# Patient Record
Sex: MALE | Race: WHITE | NOT HISPANIC OR LATINO | Employment: UNEMPLOYED | URBAN - METROPOLITAN AREA
[De-identification: names, ages, dates, MRNs, and addresses within clinical notes are randomized per-mention and may not be internally consistent; named-entity substitution may affect disease eponyms.]

---

## 2017-03-17 ENCOUNTER — HOSPITAL ENCOUNTER (EMERGENCY)
Facility: HOSPITAL | Age: 32
Discharge: HOME | End: 2017-03-17
Attending: EMERGENCY MEDICINE | Admitting: EMERGENCY MEDICINE
Payer: SUBSIDIZED

## 2017-03-17 VITALS
TEMPERATURE: 98.8 F | WEIGHT: 260 LBS | SYSTOLIC BLOOD PRESSURE: 137 MMHG | DIASTOLIC BLOOD PRESSURE: 67 MMHG | OXYGEN SATURATION: 96 % | RESPIRATION RATE: 16 BRPM | HEIGHT: 72 IN | HEART RATE: 94 BPM | BODY MASS INDEX: 35.21 KG/M2

## 2017-03-17 DIAGNOSIS — K02.9 DENTAL CARIES: ICD-10-CM

## 2017-03-17 DIAGNOSIS — K04.7 PERIAPICAL ABSCESS WITH FACIAL INVOLVEMENT: Primary | ICD-10-CM

## 2017-03-17 DIAGNOSIS — K08.89 ODONTALGIA: ICD-10-CM

## 2017-03-17 PROCEDURE — 96365 THER/PROPH/DIAG IV INF INIT: CPT

## 2017-03-17 PROCEDURE — 96375 TX/PRO/DX INJ NEW DRUG ADDON: CPT

## 2017-03-17 PROCEDURE — 99283 EMERGENCY DEPT VISIT LOW MDM: CPT

## 2017-03-17 RX ORDER — KETOROLAC TROMETHAMINE 30 MG/ML
30 INJECTION, SOLUTION INTRAMUSCULAR; INTRAVENOUS ONCE
Status: COMPLETED | OUTPATIENT
Start: 2017-03-17 | End: 2017-03-17

## 2017-03-17 RX ORDER — OXYCODONE HYDROCHLORIDE AND ACETAMINOPHEN 5; 325 MG/1; MG/1
1 TABLET ORAL EVERY 4 HOURS PRN
Qty: 15 TABLET | Refills: 0 | Status: SHIPPED | OUTPATIENT
Start: 2017-03-17

## 2017-03-17 RX ORDER — OXYCODONE HYDROCHLORIDE AND ACETAMINOPHEN 5; 325 MG/1; MG/1
1 TABLET ORAL ONCE
Status: COMPLETED | OUTPATIENT
Start: 2017-03-17 | End: 2017-03-17

## 2017-03-17 RX ORDER — CLINDAMYCIN HYDROCHLORIDE 300 MG/1
300 CAPSULE ORAL 4 TIMES DAILY
Qty: 40 CAPSULE | Refills: 0 | Status: SHIPPED | OUTPATIENT
Start: 2017-03-17 | End: 2017-03-27

## 2017-03-17 RX ORDER — CLINDAMYCIN PHOSPHATE 600 MG/50ML
600 INJECTION INTRAVENOUS ONCE
Status: COMPLETED | OUTPATIENT
Start: 2017-03-17 | End: 2017-03-17

## 2017-03-17 RX ADMIN — OXYCODONE HYDROCHLORIDE AND ACETAMINOPHEN 1 TABLET: 5; 325 TABLET ORAL at 04:43

## 2017-03-17 RX ADMIN — SODIUM CHLORIDE 1000 ML: 0.9 INJECTION, SOLUTION INTRAVENOUS at 05:03

## 2017-03-17 RX ADMIN — KETOROLAC TROMETHAMINE 30 MG: 30 INJECTION, SOLUTION INTRAMUSCULAR at 04:42

## 2017-03-17 RX ADMIN — CLINDAMYCIN PHOSPHATE 600 MG: 12 INJECTION, SOLUTION INTRAMUSCULAR; INTRAVENOUS at 04:42

## 2024-05-14 ENCOUNTER — HOSPITAL ENCOUNTER (EMERGENCY)
Facility: HOSPITAL | Age: 39
End: 2024-05-14
Attending: EMERGENCY MEDICINE
Payer: COMMERCIAL

## 2024-05-14 ENCOUNTER — HOSPITAL ENCOUNTER (INPATIENT)
Facility: HOSPITAL | Age: 39
DRG: 751 | End: 2024-05-14
Attending: PSYCHIATRY & NEUROLOGY | Admitting: PSYCHIATRY & NEUROLOGY
Payer: COMMERCIAL

## 2024-05-14 VITALS
HEART RATE: 72 BPM | TEMPERATURE: 98 F | RESPIRATION RATE: 16 BRPM | DIASTOLIC BLOOD PRESSURE: 93 MMHG | WEIGHT: 281.4 LBS | BODY MASS INDEX: 38.11 KG/M2 | SYSTOLIC BLOOD PRESSURE: 140 MMHG | OXYGEN SATURATION: 98 % | HEIGHT: 72 IN

## 2024-05-14 DIAGNOSIS — F32.9 MAJOR DEPRESSION: ICD-10-CM

## 2024-05-14 DIAGNOSIS — E55.9 VITAMIN D DEFICIENCY: ICD-10-CM

## 2024-05-14 DIAGNOSIS — F33.2 MDD (MAJOR DEPRESSIVE DISORDER), RECURRENT SEVERE, WITHOUT PSYCHOSIS (HCC): Primary | ICD-10-CM

## 2024-05-14 DIAGNOSIS — F84.0 AUTISM SPECTRUM DISORDER: Primary | ICD-10-CM

## 2024-05-14 DIAGNOSIS — K59.00 CONSTIPATION: ICD-10-CM

## 2024-05-14 DIAGNOSIS — E78.5 HYPERLIPIDEMIA: ICD-10-CM

## 2024-05-14 DIAGNOSIS — Z00.8 MEDICAL CLEARANCE FOR PSYCHIATRIC ADMISSION: ICD-10-CM

## 2024-05-14 DIAGNOSIS — E53.8 VITAMIN B 12 DEFICIENCY: ICD-10-CM

## 2024-05-14 DIAGNOSIS — K21.9 GERD (GASTROESOPHAGEAL REFLUX DISEASE): ICD-10-CM

## 2024-05-14 DIAGNOSIS — Z00.8 MEDICAL CLEARANCE FOR PSYCHIATRIC ADMISSION: Primary | ICD-10-CM

## 2024-05-14 DIAGNOSIS — E03.9 HYPOTHYROIDISM: ICD-10-CM

## 2024-05-14 LAB
ALBUMIN SERPL BCP-MCNC: 4.6 G/DL (ref 3.5–5)
ALP SERPL-CCNC: 64 U/L (ref 34–104)
ALT SERPL W P-5'-P-CCNC: 20 U/L (ref 7–52)
AMPHETAMINES SERPL QL SCN: NEGATIVE
ANION GAP SERPL CALCULATED.3IONS-SCNC: 9 MMOL/L (ref 4–13)
AST SERPL W P-5'-P-CCNC: 22 U/L (ref 13–39)
BARBITURATES UR QL: NEGATIVE
BASOPHILS # BLD AUTO: 0.03 THOUSANDS/ÂΜL (ref 0–0.1)
BASOPHILS NFR BLD AUTO: 0 % (ref 0–1)
BENZODIAZ UR QL: NEGATIVE
BILIRUB SERPL-MCNC: 0.78 MG/DL (ref 0.2–1)
BILIRUB UR QL STRIP: NEGATIVE
BUN SERPL-MCNC: 12 MG/DL (ref 5–25)
CALCIUM SERPL-MCNC: 9.7 MG/DL (ref 8.4–10.2)
CHLORIDE SERPL-SCNC: 104 MMOL/L (ref 96–108)
CLARITY UR: CLEAR
CO2 SERPL-SCNC: 26 MMOL/L (ref 21–32)
COCAINE UR QL: NEGATIVE
COLOR UR: YELLOW
CREAT SERPL-MCNC: 0.95 MG/DL (ref 0.6–1.3)
EOSINOPHIL # BLD AUTO: 0.08 THOUSAND/ÂΜL (ref 0–0.61)
EOSINOPHIL NFR BLD AUTO: 1 % (ref 0–6)
ERYTHROCYTE [DISTWIDTH] IN BLOOD BY AUTOMATED COUNT: 12.9 % (ref 11.6–15.1)
ETHANOL SERPL-MCNC: <10 MG/DL
FENTANYL UR QL SCN: NEGATIVE
GFR SERPL CREATININE-BSD FRML MDRD: 100 ML/MIN/1.73SQ M
GLUCOSE SERPL-MCNC: 100 MG/DL (ref 65–140)
GLUCOSE UR STRIP-MCNC: NEGATIVE MG/DL
HCT VFR BLD AUTO: 46.1 % (ref 36.5–49.3)
HGB BLD-MCNC: 15.1 G/DL (ref 12–17)
HGB UR QL STRIP.AUTO: NEGATIVE
HYDROCODONE UR QL SCN: NEGATIVE
IMM GRANULOCYTES # BLD AUTO: 0.01 THOUSAND/UL (ref 0–0.2)
IMM GRANULOCYTES NFR BLD AUTO: 0 % (ref 0–2)
KETONES UR STRIP-MCNC: NEGATIVE MG/DL
LEUKOCYTE ESTERASE UR QL STRIP: NEGATIVE
LYMPHOCYTES # BLD AUTO: 1.98 THOUSANDS/ÂΜL (ref 0.6–4.47)
LYMPHOCYTES NFR BLD AUTO: 28 % (ref 14–44)
MCH RBC QN AUTO: 31 PG (ref 26.8–34.3)
MCHC RBC AUTO-ENTMCNC: 32.8 G/DL (ref 31.4–37.4)
MCV RBC AUTO: 95 FL (ref 82–98)
METHADONE UR QL: NEGATIVE
MONOCYTES # BLD AUTO: 0.61 THOUSAND/ÂΜL (ref 0.17–1.22)
MONOCYTES NFR BLD AUTO: 9 % (ref 4–12)
NEUTROPHILS # BLD AUTO: 4.3 THOUSANDS/ÂΜL (ref 1.85–7.62)
NEUTS SEG NFR BLD AUTO: 62 % (ref 43–75)
NITRITE UR QL STRIP: NEGATIVE
NRBC BLD AUTO-RTO: 0 /100 WBCS
OPIATES UR QL SCN: NEGATIVE
OXYCODONE+OXYMORPHONE UR QL SCN: NEGATIVE
PCP UR QL: NEGATIVE
PH UR STRIP.AUTO: 6 [PH]
PLATELET # BLD AUTO: 273 THOUSANDS/UL (ref 149–390)
PMV BLD AUTO: 8.8 FL (ref 8.9–12.7)
POTASSIUM SERPL-SCNC: 3.7 MMOL/L (ref 3.5–5.3)
PROT SERPL-MCNC: 8.1 G/DL (ref 6.4–8.4)
PROT UR STRIP-MCNC: NEGATIVE MG/DL
RBC # BLD AUTO: 4.87 MILLION/UL (ref 3.88–5.62)
SODIUM SERPL-SCNC: 139 MMOL/L (ref 135–147)
SP GR UR STRIP.AUTO: >=1.03 (ref 1–1.03)
THC UR QL: NEGATIVE
UROBILINOGEN UR STRIP-ACNC: <2 MG/DL
WBC # BLD AUTO: 7.01 THOUSAND/UL (ref 4.31–10.16)

## 2024-05-14 PROCEDURE — 36415 COLL VENOUS BLD VENIPUNCTURE: CPT | Performed by: EMERGENCY MEDICINE

## 2024-05-14 PROCEDURE — 99285 EMERGENCY DEPT VISIT HI MDM: CPT

## 2024-05-14 PROCEDURE — 85025 COMPLETE CBC W/AUTO DIFF WBC: CPT | Performed by: EMERGENCY MEDICINE

## 2024-05-14 PROCEDURE — 81003 URINALYSIS AUTO W/O SCOPE: CPT | Performed by: EMERGENCY MEDICINE

## 2024-05-14 PROCEDURE — 99285 EMERGENCY DEPT VISIT HI MDM: CPT | Performed by: EMERGENCY MEDICINE

## 2024-05-14 PROCEDURE — 80053 COMPREHEN METABOLIC PANEL: CPT | Performed by: EMERGENCY MEDICINE

## 2024-05-14 PROCEDURE — 80307 DRUG TEST PRSMV CHEM ANLYZR: CPT | Performed by: EMERGENCY MEDICINE

## 2024-05-14 PROCEDURE — 82077 ASSAY SPEC XCP UR&BREATH IA: CPT | Performed by: EMERGENCY MEDICINE

## 2024-05-14 RX ORDER — HYDROXYZINE HYDROCHLORIDE 50 MG/1
100 TABLET, FILM COATED ORAL
Status: ACTIVE | OUTPATIENT
Start: 2024-05-14

## 2024-05-14 RX ORDER — AMOXICILLIN 250 MG
1 CAPSULE ORAL DAILY PRN
Status: DISCONTINUED | OUTPATIENT
Start: 2024-05-14 | End: 2024-11-23

## 2024-05-14 RX ORDER — OLANZAPINE 2.5 MG/1
5 TABLET, FILM COATED ORAL
Status: CANCELLED | OUTPATIENT
Start: 2024-05-14

## 2024-05-14 RX ORDER — ACETAMINOPHEN 325 MG/1
975 TABLET ORAL EVERY 6 HOURS PRN
Status: CANCELLED | OUTPATIENT
Start: 2024-05-14

## 2024-05-14 RX ORDER — OLANZAPINE 10 MG/2ML
5 INJECTION, POWDER, FOR SOLUTION INTRAMUSCULAR
Status: CANCELLED | OUTPATIENT
Start: 2024-05-14

## 2024-05-14 RX ORDER — PROPRANOLOL HYDROCHLORIDE 10 MG/1
10 TABLET ORAL EVERY 8 HOURS PRN
Status: ACTIVE | OUTPATIENT
Start: 2024-05-14

## 2024-05-14 RX ORDER — OLANZAPINE 5 MG/1
5 TABLET ORAL
Status: ACTIVE | OUTPATIENT
Start: 2024-05-14

## 2024-05-14 RX ORDER — BENZTROPINE MESYLATE 1 MG/ML
1 INJECTION, SOLUTION INTRAMUSCULAR; INTRAVENOUS
Status: ACTIVE | OUTPATIENT
Start: 2024-05-14

## 2024-05-14 RX ORDER — OLANZAPINE 10 MG/2ML
2.5 INJECTION, POWDER, FOR SOLUTION INTRAMUSCULAR
Status: CANCELLED | OUTPATIENT
Start: 2024-05-14

## 2024-05-14 RX ORDER — HYDROXYZINE HYDROCHLORIDE 25 MG/1
100 TABLET, FILM COATED ORAL
Status: CANCELLED | OUTPATIENT
Start: 2024-05-14

## 2024-05-14 RX ORDER — HYDROXYZINE HYDROCHLORIDE 25 MG/1
50 TABLET, FILM COATED ORAL
Status: CANCELLED | OUTPATIENT
Start: 2024-05-14

## 2024-05-14 RX ORDER — DIPHENHYDRAMINE HYDROCHLORIDE 50 MG/ML
50 INJECTION INTRAMUSCULAR; INTRAVENOUS EVERY 6 HOURS PRN
Status: ACTIVE | OUTPATIENT
Start: 2024-05-14

## 2024-05-14 RX ORDER — ACETAMINOPHEN 325 MG/1
975 TABLET ORAL EVERY 6 HOURS PRN
Status: DISPENSED | OUTPATIENT
Start: 2024-05-14

## 2024-05-14 RX ORDER — TRAZODONE HYDROCHLORIDE 50 MG/1
50 TABLET ORAL
Status: CANCELLED | OUTPATIENT
Start: 2024-05-14

## 2024-05-14 RX ORDER — MAGNESIUM HYDROXIDE/ALUMINUM HYDROXICE/SIMETHICONE 120; 1200; 1200 MG/30ML; MG/30ML; MG/30ML
30 SUSPENSION ORAL EVERY 4 HOURS PRN
Status: ACTIVE | OUTPATIENT
Start: 2024-05-14

## 2024-05-14 RX ORDER — OLANZAPINE 2.5 MG/1
2.5 TABLET, FILM COATED ORAL
Status: ACTIVE | OUTPATIENT
Start: 2024-05-14

## 2024-05-14 RX ORDER — ACETAMINOPHEN 325 MG/1
650 TABLET ORAL EVERY 6 HOURS PRN
Status: CANCELLED | OUTPATIENT
Start: 2024-05-14

## 2024-05-14 RX ORDER — BENZTROPINE MESYLATE 1 MG/1
1 TABLET ORAL
Status: ACTIVE | OUTPATIENT
Start: 2024-05-14

## 2024-05-14 RX ORDER — ACETAMINOPHEN 325 MG/1
650 TABLET ORAL EVERY 4 HOURS PRN
Status: ACTIVE | OUTPATIENT
Start: 2024-05-14

## 2024-05-14 RX ORDER — HYDROXYZINE HYDROCHLORIDE 25 MG/1
25 TABLET, FILM COATED ORAL
Status: ACTIVE | OUTPATIENT
Start: 2024-05-14

## 2024-05-14 RX ORDER — ACETAMINOPHEN 325 MG/1
650 TABLET ORAL EVERY 6 HOURS PRN
Status: ACTIVE | OUTPATIENT
Start: 2024-05-14

## 2024-05-14 RX ORDER — TRAZODONE HYDROCHLORIDE 50 MG/1
50 TABLET, FILM COATED ORAL
Status: DISPENSED | OUTPATIENT
Start: 2024-05-14

## 2024-05-14 RX ORDER — HYDROXYZINE HYDROCHLORIDE 25 MG/1
25 TABLET, FILM COATED ORAL
Status: CANCELLED | OUTPATIENT
Start: 2024-05-14

## 2024-05-14 RX ORDER — OLANZAPINE 2.5 MG/1
2.5 TABLET, FILM COATED ORAL
Status: CANCELLED | OUTPATIENT
Start: 2024-05-14

## 2024-05-14 RX ORDER — BENZTROPINE MESYLATE 0.5 MG/1
1 TABLET ORAL
Status: CANCELLED | OUTPATIENT
Start: 2024-05-14

## 2024-05-14 RX ORDER — LANOLIN ALCOHOL/MO/W.PET/CERES
3 CREAM (GRAM) TOPICAL
Status: CANCELLED | OUTPATIENT
Start: 2024-05-14

## 2024-05-14 RX ORDER — POLYETHYLENE GLYCOL 3350 17 G/17G
17 POWDER, FOR SOLUTION ORAL DAILY PRN
Status: CANCELLED | OUTPATIENT
Start: 2024-05-14

## 2024-05-14 RX ORDER — BENZTROPINE MESYLATE 1 MG/ML
1 INJECTION INTRAMUSCULAR; INTRAVENOUS
Status: CANCELLED | OUTPATIENT
Start: 2024-05-14

## 2024-05-14 RX ORDER — POLYETHYLENE GLYCOL 3350 17 G/17G
17 POWDER, FOR SOLUTION ORAL DAILY PRN
Status: ACTIVE | OUTPATIENT
Start: 2024-05-14

## 2024-05-14 RX ORDER — DIPHENHYDRAMINE HYDROCHLORIDE 50 MG/ML
50 INJECTION INTRAMUSCULAR; INTRAVENOUS EVERY 6 HOURS PRN
Status: CANCELLED | OUTPATIENT
Start: 2024-05-14

## 2024-05-14 RX ORDER — ACETAMINOPHEN 325 MG/1
650 TABLET ORAL EVERY 4 HOURS PRN
Status: CANCELLED | OUTPATIENT
Start: 2024-05-14

## 2024-05-14 RX ORDER — LORAZEPAM 2 MG/ML
2 INJECTION INTRAMUSCULAR EVERY 6 HOURS PRN
Status: CANCELLED | OUTPATIENT
Start: 2024-05-14

## 2024-05-14 RX ORDER — MAGNESIUM HYDROXIDE/ALUMINUM HYDROXICE/SIMETHICONE 120; 1200; 1200 MG/30ML; MG/30ML; MG/30ML
30 SUSPENSION ORAL EVERY 4 HOURS PRN
Status: CANCELLED | OUTPATIENT
Start: 2024-05-14

## 2024-05-14 RX ORDER — PROPRANOLOL HYDROCHLORIDE 20 MG/1
10 TABLET ORAL EVERY 8 HOURS PRN
Status: CANCELLED | OUTPATIENT
Start: 2024-05-14

## 2024-05-14 RX ORDER — AMOXICILLIN 250 MG
1 CAPSULE ORAL DAILY PRN
Status: CANCELLED | OUTPATIENT
Start: 2024-05-14

## 2024-05-14 RX ORDER — OLANZAPINE 10 MG/2ML
2.5 INJECTION, POWDER, FOR SOLUTION INTRAMUSCULAR
Status: ACTIVE | OUTPATIENT
Start: 2024-05-14

## 2024-05-14 RX ORDER — LORAZEPAM 2 MG/ML
2 INJECTION INTRAMUSCULAR EVERY 6 HOURS PRN
Status: ACTIVE | OUTPATIENT
Start: 2024-05-14

## 2024-05-14 RX ORDER — OLANZAPINE 10 MG/2ML
5 INJECTION, POWDER, FOR SOLUTION INTRAMUSCULAR
Status: ACTIVE | OUTPATIENT
Start: 2024-05-14

## 2024-05-14 RX ORDER — HYDROXYZINE HYDROCHLORIDE 50 MG/1
50 TABLET, FILM COATED ORAL
Status: ACTIVE | OUTPATIENT
Start: 2024-05-14

## 2024-05-14 NOTE — EMTALA/ACUTE CARE TRANSFER
[facility] EMERGENCY DEPARTMENT  [address] [address]  Dept: [phone]      EMTALA TRANSFER CONSENT    NAME Franco Roberson                                         1985                              MRN [id number]    I have been informed of my rights regarding examination, treatment, and transfer   by Dr. Belle Castro DO    Benefits: Specialized equipment and/or services available at the receiving facility (Include comment)________________________    Risks: Potential for delay in receiving treatment, Potential deterioration of medical condition, Increased discomfort during transfer, Possible worsening of condition or death during transfer      Consent for Transfer:  I acknowledge that my medical condition has been evaluated and explained to me by the emergency department physician or other qualified medical person and/or my attending physician, who has recommended that I be transferred to the service of  Accepting Physician: Dr. Landrum at Accepting Facility Name, City & State : [facility]. The above potential benefits of such transfer, the potential risks associated with such transfer, and the probable risks of not being transferred have been explained to me, and I fully understand them.  The doctor has explained that, in my case, the benefits of transfer outweigh the risks.  I agree to be transferred.    I authorize the performance of emergency medical procedures and treatments upon me in both transit and upon arrival at the receiving facility.  Additionally, I authorize the release of any and all medical records to the receiving facility and request they be transported with me, if possible.  I understand that the safest mode of transportation during a medical emergency is an ambulance and that the Hospital advocates the use of this mode of transport. Risks of traveling to the receiving facility by car, including absence of medical  control, life sustaining equipment, such as oxygen, and medical personnel has been explained to me and I fully understand them.    (NOLAN CORRECT BOX BELOW)  [  ]  I consent to the stated transfer and to be transported by ambulance/helicopter.  [  ]  I consent to the stated transfer, but refuse transportation by ambulance and accept full responsibility for my transportation by car.  I understand the risks of non-ambulance transfers and I exonerate the Hospital and its staff from any deterioration in my condition that results from this refusal.    X___________________________________________    DATE  24  TIME________  Signature of patient or legally responsible individual signing on patient behalf           RELATIONSHIP TO PATIENT_________________________          Provider Certification    NAME Franco Roberson                                        [facility] 1985                              MRN [id number]    A medical screening exam was performed on the above named patient.  Based on the examination:    Condition Necessitating Transfer The primary encounter diagnosis was Medical clearance for psychiatric admission. A diagnosis of Major depression was also pertinent to this visit.    Patient Condition: The patient has been stabilized such that within reasonable medical probability, no material deterioration of the patient condition or the condition of the unborn child(myles) is likely to result from the transfer    Reason for Transfer: Level of Care needed not available at this facility    Transfer Requirements: Facility [facility]   Space available and qualified personnel available for treatment as acknowledged by    Agreed to accept transfer and to provide appropriate medical treatment as acknowledged by       Dr. Landrum  Appropriate medical records of the examination and treatment of the patient are provided at the time of transfer   STAFF INITIAL WHEN COMPLETED _______  Transfer  will be performed by qualified personnel from    and appropriate transfer equipment as required, including the use of necessary and appropriate life support measures.    Provider Certification: I have examined the patient and explained the following risks and benefits of being transferred/refusing transfer to the patient/family:  General risk, such as traffic hazards, adverse weather conditions, rough terrain or turbulence, possible failure of equipment (including vehicle or aircraft), or consequences of actions of persons outside the control of the transport personnel, Unanticipated needs of medical equipment and personnel during transport, Risk of worsening condition, The possibility of a transport vehicle being unavailable, The patient is stable for psychiatric transfer because they are medically stable, and is protected from harming him/herself or others during transport      Based on these reasonable risks and benefits to the patient and/or the unborn child(myles), and based upon the information available at the time of the patient’s examination, I certify that the medical benefits reasonably to be expected from the provision of appropriate medical treatments at another medical facility outweigh the increasing risks, if any, to the individual’s medical condition, and in the case of labor to the unborn child, from effecting the transfer.    X____________________________________________ DATE 05/14/24        TIME_______      ORIGINAL - SEND TO MEDICAL RECORDS   COPY - SEND WITH PATIENT DURING TRANSFER

## 2024-05-14 NOTE — NURSING NOTE
"Patient is 39-year-old male admitted as 201 from [place] ED. Past history of nonspecific psychiatric disorder. Recently came to ED due to recent increase in SI without plan. Per notes, patient has positive outlook on idea of suicide due to recent eviction and other life stressors such as losing mother, not having job, having high bills. Vague ruminations such as thinking about death by jumping off bridge, jumping into traffic or train. This would be the first of this kind of psychiatric admission. One attempt 2 months ago to end life via starvation and taking \"a few\" OTC sleep meds. Pt reported low likelihood of death or hospitalization resulting from said attempt which was self interrupted. No history of psychotropic meds, despite patient stating he’s suffered from depression since [age] years old patient verbalizes having a really good friend and not wanting to hurt him as protective factor. No intoxicants uncovered in system.    While on 3B, pt endorsed anxiety and depression that was controlled, denied SI, HI. Reported no hx of psychosis. Pleasant and redirectable, coherent. Oriented to unit by RN. Condition is stable.    "

## 2024-05-14 NOTE — ED CARE HANDOFF
Emergency Department Sign Out Note        Sign out and transfer of care from previous provider. See Separate Emergency Department note.     The patient, Franco Roberson, was evaluated by the previous provider for suicide ideation.    Workup Completed:  Medical clearance workup    ED Course / Workup Pending (followup):  Crisis evaluation                                  ED Course as of 05/14/24 1451   Tue May 14, 2024   0800 Patient is medically cleared for inpatient psychiatric admission and evaluation.  Patient is currently awaiting crisis evaluation.   0848 Patient was evaluated by our crisis worker.  At this time patient is voluntary for inpatient psychiatric bed placement.  Patient is currently awaiting inpatient psychiatric bed search.   0916 201 form completed.   1448 Patient accepted to [facility] by Dr. Landrum.  EMTALA completed.  Patient is awaiting transport.     Procedures  Medical Decision Making  Patient is currently medically cleared for inpatient psychiatric admission and evaluation.  Patient is currently having suicidal ideation.  Patient was seen by our crisis worker.  Patient is currently voluntary status for inpatient psychiatric admission.  Patient completed 201 form.      Patient accepted to [facility] by Dr. Landrum.  EMTALA completed.  Patient stable at time of transport.    Amount and/or Complexity of Data Reviewed  Labs: ordered.    Risk  Decision regarding hospitalization.            Disposition  Final diagnoses:   Major depression     Time reflects when diagnosis was documented in both MDM as applicable and the Disposition within this note       Time User Action Codes Description Comment    5/14/2024  2:37 PM Basilio Daniels Add [Z00.8] Medical clearance for psychiatric admission     5/14/2024  2:49 PM Belle Castro Add [F32.9] Major depression           ED Disposition       ED Disposition   Transfer to Behavioral  Health    Condition   --    Date/Time   Tue May 14, 2024  2:49 PM    Comment   Franco Roberson should be transferred out to [facility] under Dr. Landrum, and has been medically cleared.               MD Documentation      Flowsheet Row Most Recent Value   Patient Condition The patient has been stabilized such that within reasonable medical probability, no material deterioration of the patient condition or the condition of the unborn child(myles) is likely to result from the transfer   Reason for Transfer Level of Care needed not available at this facility   Benefits of Transfer Specialized equipment and/or services available at the receiving facility (Include comment)________________________   Risks of Transfer Potential for delay in receiving treatment, Potential deterioration of medical condition, Increased discomfort during transfer, Possible worsening of condition or death during transfer   Accepting Physician Dr. Landrum   Accepting Facility [facility], [facility]   Sending MD Dr. Castro   Provider Certification General risk, such as traffic hazards, adverse weather conditions, rough terrain or turbulence, possible failure of equipment (including vehicle or aircraft), or consequences of actions of persons outside the control of the transport personnel, Unanticipated needs of medical equipment and personnel during transport, Risk of worsening condition, The possibility of a transport vehicle being unavailable, The patient is stable for psychiatric transfer because they are medically stable, and is protected from harming him/herself or others during transport          RN Documentation      Flowsheet Row Most Recent Value   Accepting Facility Name, [facility]          Follow-up Information    None       Patient's Medications   Discharge Prescriptions    No medications on file     No discharge  procedures on file.       ED Provider  Electronically Signed by     Belle Castro DO  05/14/24 0803

## 2024-05-14 NOTE — ED PROVIDER NOTES
Final Diagnosis:  No diagnosis found.    Chief Complaint   Patient presents with    Psychiatric Evaluation     Pt states he came in for psychiatric evaluation states he was evicted from his apt yesterday and state he is homeless, pt states he hasn't eaten in 2-3 days.  Pt stated thoughts of SI had plans to  front of a train or jump off [place]. Pt denies HI and advised he is not currently on medication and does not see an psychiatrist at this time.        HPI  Patietn pres w/ SI. No plan but feeling would be good idea because of recent eviction and other life stressors. Vague plans like train/traffic. No HI. No prior crisis eval. No antidep. Never been to ER for similar. No prior inpatient. Voluntary eval. No intoxicants.     EMS additionally reports:     - Previous charting underwent limited review with attention to last ED visits, labs, ekgs, and prior imaging.  Chart review reveals :     No results found for any previous visit.       - No language barrier.   - History obtained from patient    - Discuss patient's care, with patient permission or by chart review, with      PMH:   has a past medical history of Psychiatric disorder.    PSH:   has no past surgical history on file.     Social History:  Tobacco Use: Unknown (5/14/2024)    Patient History     Smoking Tobacco Use: Never     Smokeless Tobacco Use: Unknown     Passive Exposure: Not on file     Alcohol Use: Not on file     No illicit use       ROS:  Pertinent positives/negatives: .     Some ROS may be present in the HPI and would take precedent over these standard questions asked below.   Review of Systems   Psychiatric/Behavioral:  Positive for dysphoric mood, sleep disturbance and suicidal ideas.         CONSTITUTIONAL:  No lethargy. No unexpected weight loss. No change in behavior.  EYES:  No pain, redness, or discharge. No loss of vision. No orbital trauma or pain.   ENT:  No tinnitus or decreased hearing. No epistaxis/purulent rhinorrhea. No  voice change, airway closing, trismus.   CARDIOVASCULAR:  No chest pain. No skin mottling or pallor. No change in exertional capacity  RESPIRATORY:  No hemoptysis. No paroxysmal nocturnal dyspnea. No stridor. No apnea or bluing.   GASTROINTESTINAL:  No vomiting, diarrhea. No distension. No melena. No hematochezia.   GENITOURINARY:  No nocturia. No hematuria or foul smelling or cloudy urine. No discharge. No sores/adenopathy.   MUSCULOSKELETAL:  No contracture.  No new deformity.   INTEGUMENTARY:  No swelling. No unexpected contusions. No abrasions. No lymphangitis.  NEUROLOGIC:  No meningismus. No new numbness of the extremities. No new focal weakness. No postural instability  PSYCHIATRIC:  No HI AVH  HEMATOLOGICAL:  No bleeding. No petechiae. No bruising.  ALLERGIES:  No urticaria. No sudden abd cramping. No stridor.    PE:     Physical exam highlights:   Physical Exam       Vitals:    05/14/24 0533 05/14/24 0730   BP: 136/90 129/86   BP Location: Right arm    Pulse: 74 70   Resp: 18 20   Temp: 98.5 °F (36.9 °C) 98 °F (36.7 °C)   TempSrc: Oral    SpO2: 99% 99%   Weight: 128 kg (281 lb 6.4 oz)    Height: 6' (1.829 m)      Vitals reviewed by me.   Nursing note reviewed  Chaperone present for all sensitive exam.  Const: No acute distress. Alert. Nontoxic. Not diaphoretic.    HEENT: External ears normal. No protrusion drainage swelling. Nose normal. No drainage/traumatic deformity. MM. Mouth with baseline/symmetric movement. No trismus.   Eyes: No squinting. No icterus. No tearing/swelling/drainage. Tracks through the room with normal EOM.   Neck: ROM normal. No rigidity. No meningismus.  Cards: Rate as per vitals Compared to monitor sinus unless documented. Regular Well perfused.  Pulm: Effort and excursion normal. No distress. No audible wheezing/no stridor. Normal resp rate without retraction or change in work of breathing.  Abd: No distension beyond baseline. No fluctuant wave. Patient without peritoneal pain with  shifting/bumping the bed.   MSK: ROM normal baseline. No deformity. No contractures from baseline.   Skin: No new rashes visible. Well perfused. No wounds visualized on exposed skin  Neuro: Nonfocal. Baseline. CN grossly intact. Moving all four with coordination.   Psych: flat affect and SI         A:  - Nursing note reviewed.    Ddx and MDM  Considered diagnoses    Medically clear for crisis evaluation         Dispo decision       My conversation with consultant reveals:        Decision rules:                           My read of the XR/CT scan reveals:     No orders to display       Orders Placed This Encounter   Procedures    Rapid drug screen, urine    CBC and differential    Comprehensive metabolic panel    Ethanol    UA (URINE) with reflex to Scope    Diet Regular; Finger Foods; Regular House    Nursing communication Prepare room for behavioral health patient     Labs Reviewed   CBC AND DIFFERENTIAL - Abnormal       Result Value Ref Range Status    WBC 7.01  4.31 - 10.16 Thousand/uL Final    RBC 4.87  3.88 - 5.62 Million/uL Final    Hemoglobin 15.1  12.0 - 17.0 g/dL Final    Hematocrit 46.1  36.5 - 49.3 % Final    MCV 95  82 - 98 fL Final    MCH 31.0  26.8 - 34.3 pg Final    MCHC 32.8  31.4 - 37.4 g/dL Final    RDW 12.9  11.6 - 15.1 % Final    MPV 8.8 (*) 8.9 - 12.7 fL Final    Platelets 273  149 - 390 Thousands/uL Final    nRBC 0  /100 WBCs Final    Segmented % 62  43 - 75 % Final    Immature Grans % 0  0 - 2 % Final    Lymphocytes % 28  14 - 44 % Final    Monocytes % 9  4 - 12 % Final    Eosinophils Relative 1  0 - 6 % Final    Basophils Relative 0  0 - 1 % Final    Absolute Neutrophils 4.30  1.85 - 7.62 Thousands/µL Final    Absolute Immature Grans 0.01  0.00 - 0.20 Thousand/uL Final    Absolute Lymphocytes 1.98  0.60 - 4.47 Thousands/µL Final    Absolute Monocytes 0.61  0.17 - 1.22 Thousand/µL Final    Eosinophils Absolute 0.08  0.00 - 0.61 Thousand/µL Final    Basophils Absolute 0.03  0.00 - 0.10  Thousands/µL Final   MEDICAL ALCOHOL - Normal    Ethanol Lvl <10  <10 mg/dL Final   COMPREHENSIVE METABOLIC PANEL    Sodium 139  135 - 147 mmol/L Final    Potassium 3.7  3.5 - 5.3 mmol/L Final    Chloride 104  96 - 108 mmol/L Final    CO2 26  21 - 32 mmol/L Final    ANION GAP 9  4 - 13 mmol/L Final    BUN 12  5 - 25 mg/dL Final    Creatinine 0.95  0.60 - 1.30 mg/dL Final    Comment: Standardized to IDMS reference method    Glucose 100  65 - 140 mg/dL Final    Comment: If the patient is fasting, the ADA then defines impaired fasting glucose as > 100 mg/dL and diabetes as > or equal to 123 mg/dL.    Calcium 9.7  8.4 - 10.2 mg/dL Final    AST 22  13 - 39 U/L Final    ALT 20  7 - 52 U/L Final    Comment: Specimen collection should occur prior to Sulfasalazine administration due to the potential for falsely depressed results.     Alkaline Phosphatase 64  34 - 104 U/L Final    Total Protein 8.1  6.4 - 8.4 g/dL Final    Albumin 4.6  3.5 - 5.0 g/dL Final    Total Bilirubin 0.78  0.20 - 1.00 mg/dL Final    Comment: Use of this assay is not recommended for patients undergoing treatment with eltrombopag due to the potential for falsely elevated results.  N-acetyl-p-benzoquinone imine (metabolite of Acetaminophen) will generate erroneously low results in samples for patients that have taken an overdose of Acetaminophen.    eGFR 100  ml/min/1.73sq m Final    Narrative:     National Kidney Disease Foundation guidelines for Chronic Kidney Disease (CKD):     Stage 1 with normal or high GFR (GFR > 90 mL/min/1.73 square meters)    Stage 2 Mild CKD (GFR = 60-89 mL/min/1.73 square meters)    Stage 3A Moderate CKD (GFR = 45-59 mL/min/1.73 square meters)    Stage 3B Moderate CKD (GFR = 30-44 mL/min/1.73 square meters)    Stage 4 Severe CKD (GFR = 15-29 mL/min/1.73 square meters)    Stage 5 End Stage CKD (GFR <15 mL/min/1.73 square meters)  Note: GFR calculation is accurate only with a steady state creatinine   RAPID DRUG SCREEN, URINE  "  URINALYSIS WITH REFLEX TO SCOPE       *Each of these labs was reviewed. Particular standout labs will be noted in the ED Course above     Final Diagnosis:  No diagnosis found.      P:  - hospital tx includes   Medications - No data to display      - disposition  ED Disposition       None          Follow-up Information    None         - patient will call their PCP to let them know they were in the emergency department. We discuss return precautions and patient is agreeable with plan and aformentioned disposition.       - additional treatment intended, if consistent with primary provider:  - patient to follow with :      Patient's Medications   Discharge Prescriptions    No medications on file     No discharge procedures on file.  Prior to Admission Medications   Prescriptions Last Dose Informant Patient Reported? Taking?   oxyCODONE-acetaminophen (PERCOCET) 5-325 mg per tablet   No No   Sig: Take 1 tablet by mouth every 4 (four) hours as needed for moderate pain for up to 15 doses Max Daily Amount: 6 tablets      Facility-Administered Medications: None       Portions of the record may have been created with voice recognition software. Occasional wrong word or \"sound a like\" substitutions may have occurred due to the inherent limitations of voice recognition software. Read the chart carefully and recognize, using context, where substitutions have occurred.    Electronically signed by:  MD Timothy Marquez MD  05/14/24 0863    "

## 2024-05-14 NOTE — ED NOTES
"24 @ 0830:  39-year-old male self presented to ED after being evicted from his apartment and experiencing suicidal thoughts to jump off a bridge or jump in front of traffic. Patient says, \"I knew the eviction was coming but I just couldn't do anything about it; my mother used to pay all of the bills and she  in December; I don't have a job and I just can't seem to live in a society that revolves around money.\" Also, patient says he has suffered from depression since \"I was 8 years old.\" Patient has some protective factors, such as, \"I really do like life and I want to live, but I just don't have anything to live for right now; I have a really good friend and I wouldn't want to hurt him.\" Even with the protective factors, patient says, \"If I leave here, I would probably jump off a bridge because I've got nothing; I don't want to be a burden.\" Please see copy of crisis assessment for further details. Patient is agreeable with inpatient psychiatric treatment. Patient is uninsured so he will be referred to the [facility]'s network.     MS Yanci  "

## 2024-05-14 NOTE — PLAN OF CARE
Problem: Ineffective Coping  Goal: Cooperates with admission process  Description: Interventions:   - Complete admission process  Outcome: Not Progressing  Goal: Identifies ineffective coping skills  Outcome: Not Progressing  Goal: Identifies healthy coping skills  Outcome: Not Progressing  Goal: Demonstrates healthy coping skills  Outcome: Not Progressing  Goal: Participates in unit activities  Description: Interventions:  - Provide therapeutic environment   - Provide required programming   - Redirect inappropriate behaviors   Outcome: Not Progressing     Problem: Risk for Self Injury/Neglect  Goal: Treatment Goal: Remain safe during length of stay, learn and adopt new coping skills, and be free of self-injurious ideation, impulses and acts at the time of discharge  Outcome: Not Progressing     Problem: Depression  Goal: Treatment Goal: Demonstrate behavioral control of depressive symptoms, verbalize feelings of improved mood/affect, and adopt new coping skills prior to discharge  Outcome: Not Progressing  Goal: Verbalize thoughts and feelings  Description: Interventions:  - Assess and re-assess patient's level of risk   - Engage patient in 1:1 interactions, daily, for a minimum of 15 minutes   - Encourage patient to express feelings, fears, frustrations, hopes   Outcome: Not Progressing  Goal: Refrain from harming self  Description: Interventions:  - Monitor patient closely, per order   - Supervise medication ingestion, monitor effects and side effects   Outcome: Not Progressing  Goal: Refrain from isolation  Description: Interventions:  - Develop a trusting relationship   - Encourage socialization   Outcome: Not Progressing  Goal: Refrain from self-neglect  Outcome: Not Progressing  Goal: Attend and participate in unit activities, including therapeutic, recreational, and educational groups  Description: Interventions:  - Provide therapeutic and educational activities daily, encourage attendance and  participation, and document same in the medical record   Outcome: Not Progressing  Goal: Complete daily ADLs, including personal hygiene independently, as able  Description: Interventions:  - Observe, teach, and assist patient with ADLS  -  Monitor and promote a balance of rest/activity, with adequate nutrition and elimination   Outcome: Not Progressing     Problem: Anxiety  Goal: Anxiety is at manageable level  Description: Interventions:  - Assess and monitor patient's anxiety level.   - Monitor for signs and symptoms (heart palpitations, chest pain, shortness of breath, headaches, nausea, feeling jumpy, restlessness, irritable, apprehensive).   - Collaborate with interdisciplinary team and initiate plan and interventions as ordered.  - [place] patient to unit/surroundings  - Explain treatment plan  - Encourage participation in care  - Encourage verbalization of concerns/fears  - Identify coping mechanisms  - Assist in developing anxiety-reducing skills  - Administer/offer alternative therapies  - Limit or eliminate stimulants  Outcome: Not Progressing

## 2024-05-14 NOTE — ED NOTES
Pt awake. Moved to secured holding. Ambulates without difficulty. Pleasant and cooperative.      Annalee Stuart RN  05/14/24 1336

## 2024-05-14 NOTE — ED NOTES
5/14/24 @ 0920:  PES faxed [phone] to [facility].  MS Yanci    8204: Patient is accepted at [facility]  Patient is accepted by Dr. YOLANDA CARTAGENA  Nurse report is to be called to [phone] prior to patient transfer.    Transportation is arranged with [employer]. PES requested 1600 pickup.  MS Yanci    Transportation is scheduled for 1600 via [employer]  PES notified ED staff, patient and Counts include 234 beds at the [facility].    MS Yanci

## 2024-05-15 PROBLEM — E53.8 B12 DEFICIENCY: Status: ACTIVE | Noted: 2024-05-15

## 2024-05-15 PROBLEM — Z00.8 MEDICAL CLEARANCE FOR PSYCHIATRIC ADMISSION: Status: ACTIVE | Noted: 2024-05-15

## 2024-05-15 PROBLEM — E55.9 VITAMIN D DEFICIENCY: Status: ACTIVE | Noted: 2024-05-15

## 2024-05-15 PROBLEM — E03.9 HYPOTHYROIDISM: Status: ACTIVE | Noted: 2024-05-15

## 2024-05-15 PROBLEM — F39 UNSPECIFIED MOOD (AFFECTIVE) DISORDER (HCC): Status: ACTIVE | Noted: 2024-05-15

## 2024-05-15 PROBLEM — E78.5 HYPERLIPIDEMIA: Status: ACTIVE | Noted: 2024-05-15

## 2024-05-15 LAB
25(OH)D3 SERPL-MCNC: 11.6 NG/ML (ref 30–100)
ALBUMIN SERPL BCG-MCNC: 4.3 G/DL (ref 3.5–5)
ALP SERPL-CCNC: 66 U/L (ref 34–104)
ALT SERPL W P-5'-P-CCNC: 19 U/L (ref 7–52)
ANION GAP SERPL CALCULATED.3IONS-SCNC: 8 MMOL/L (ref 4–13)
AST SERPL W P-5'-P-CCNC: 21 U/L (ref 13–39)
ATRIAL RATE: 66 BPM
BASOPHILS # BLD AUTO: 0.02 THOUSANDS/ΜL (ref 0–0.1)
BASOPHILS NFR BLD AUTO: 0 % (ref 0–1)
BILIRUB SERPL-MCNC: 0.69 MG/DL (ref 0.2–1)
BUN SERPL-MCNC: 13 MG/DL (ref 5–25)
CALCIUM SERPL-MCNC: 9.3 MG/DL (ref 8.4–10.2)
CHLORIDE SERPL-SCNC: 106 MMOL/L (ref 96–108)
CHOLEST SERPL-MCNC: 191 MG/DL
CO2 SERPL-SCNC: 28 MMOL/L (ref 21–32)
CREAT SERPL-MCNC: 0.97 MG/DL (ref 0.6–1.3)
EOSINOPHIL # BLD AUTO: 0.1 THOUSAND/ΜL (ref 0–0.61)
EOSINOPHIL NFR BLD AUTO: 2 % (ref 0–6)
ERYTHROCYTE [DISTWIDTH] IN BLOOD BY AUTOMATED COUNT: 12.6 % (ref 11.6–15.1)
FOLATE SERPL-MCNC: >22.3 NG/ML
GFR SERPL CREATININE-BSD FRML MDRD: 97 ML/MIN/1.73SQ M
GLUCOSE P FAST SERPL-MCNC: 89 MG/DL (ref 65–99)
GLUCOSE SERPL-MCNC: 89 MG/DL (ref 65–140)
HCT VFR BLD AUTO: 49.4 % (ref 36.5–49.3)
HDLC SERPL-MCNC: 44 MG/DL
HGB BLD-MCNC: 16.1 G/DL (ref 12–17)
IMM GRANULOCYTES # BLD AUTO: 0.01 THOUSAND/UL (ref 0–0.2)
IMM GRANULOCYTES NFR BLD AUTO: 0 % (ref 0–2)
LDLC SERPL CALC-MCNC: 124 MG/DL (ref 0–100)
LYMPHOCYTES # BLD AUTO: 2.06 THOUSANDS/ΜL (ref 0.6–4.47)
LYMPHOCYTES NFR BLD AUTO: 38 % (ref 14–44)
MCH RBC QN AUTO: 31.9 PG (ref 26.8–34.3)
MCHC RBC AUTO-ENTMCNC: 32.6 G/DL (ref 31.4–37.4)
MCV RBC AUTO: 98 FL (ref 82–98)
MONOCYTES # BLD AUTO: 0.4 THOUSAND/ΜL (ref 0.17–1.22)
MONOCYTES NFR BLD AUTO: 7 % (ref 4–12)
NEUTROPHILS # BLD AUTO: 2.83 THOUSANDS/ΜL (ref 1.85–7.62)
NEUTS SEG NFR BLD AUTO: 53 % (ref 43–75)
NONHDLC SERPL-MCNC: 147 MG/DL
NRBC BLD AUTO-RTO: 0 /100 WBCS
P AXIS: 39 DEGREES
PLATELET # BLD AUTO: 268 THOUSANDS/UL (ref 149–390)
PMV BLD AUTO: 9.2 FL (ref 8.9–12.7)
POTASSIUM SERPL-SCNC: 4.1 MMOL/L (ref 3.5–5.3)
PR INTERVAL: 146 MS
PROT SERPL-MCNC: 7.6 G/DL (ref 6.4–8.4)
QRS AXIS: 48 DEGREES
QRSD INTERVAL: 70 MS
QT INTERVAL: 402 MS
QTC INTERVAL: 421 MS
RBC # BLD AUTO: 5.05 MILLION/UL (ref 3.88–5.62)
SODIUM SERPL-SCNC: 142 MMOL/L (ref 135–147)
T WAVE AXIS: 41 DEGREES
T4 FREE SERPL-MCNC: 0.6 NG/DL (ref 0.61–1.12)
TREPONEMA PALLIDUM IGG+IGM AB [PRESENCE] IN SERUM OR PLASMA BY IMMUNOASSAY: NORMAL
TRIGL SERPL-MCNC: 116 MG/DL
TSH SERPL DL<=0.05 MIU/L-ACNC: 5.16 UIU/ML (ref 0.45–4.5)
VENTRICULAR RATE: 66 BPM
VIT B12 SERPL-MCNC: 168 PG/ML (ref 180–914)
WBC # BLD AUTO: 5.42 THOUSAND/UL (ref 4.31–10.16)

## 2024-05-15 PROCEDURE — 82607 VITAMIN B-12: CPT

## 2024-05-15 PROCEDURE — 93010 ELECTROCARDIOGRAM REPORT: CPT

## 2024-05-15 PROCEDURE — 93005 ELECTROCARDIOGRAM TRACING: CPT

## 2024-05-15 PROCEDURE — 84439 ASSAY OF FREE THYROXINE: CPT

## 2024-05-15 PROCEDURE — 82746 ASSAY OF FOLIC ACID SERUM: CPT

## 2024-05-15 PROCEDURE — 86780 TREPONEMA PALLIDUM: CPT

## 2024-05-15 PROCEDURE — 99253 IP/OBS CNSLTJ NEW/EST LOW 45: CPT | Performed by: NURSE PRACTITIONER

## 2024-05-15 PROCEDURE — 80053 COMPREHEN METABOLIC PANEL: CPT

## 2024-05-15 PROCEDURE — 85025 COMPLETE CBC W/AUTO DIFF WBC: CPT

## 2024-05-15 PROCEDURE — 99223 1ST HOSP IP/OBS HIGH 75: CPT | Performed by: STUDENT IN AN ORGANIZED HEALTH CARE EDUCATION/TRAINING PROGRAM

## 2024-05-15 PROCEDURE — 84443 ASSAY THYROID STIM HORMONE: CPT

## 2024-05-15 PROCEDURE — 82306 VITAMIN D 25 HYDROXY: CPT

## 2024-05-15 PROCEDURE — 80061 LIPID PANEL: CPT

## 2024-05-15 RX ORDER — ERGOCALCIFEROL 1.25 MG/1
50000 CAPSULE, LIQUID FILLED ORAL WEEKLY
Status: DISCONTINUED | OUTPATIENT
Start: 2024-05-15 | End: 2024-07-04

## 2024-05-15 RX ORDER — CYANOCOBALAMIN 1000 UG/ML
1000 INJECTION, SOLUTION INTRAMUSCULAR; SUBCUTANEOUS ONCE
Status: DISCONTINUED | OUTPATIENT
Start: 2024-05-15 | End: 2024-05-17

## 2024-05-15 RX ORDER — LEVOTHYROXINE SODIUM 25 UG/1
25 TABLET ORAL
Status: DISCONTINUED | OUTPATIENT
Start: 2024-05-16 | End: 2024-07-10

## 2024-05-15 RX ORDER — ATORVASTATIN CALCIUM 10 MG/1
10 TABLET, FILM COATED ORAL
Status: DISCONTINUED | OUTPATIENT
Start: 2024-05-15 | End: 2025-01-23

## 2024-05-15 NOTE — ASSESSMENT & PLAN NOTE
Admission labs: CBC and CMP acceptable  LDL elevated   TSH high with a low free T4  B12 and vitamin D low   Vitals stable   UA unremarkable   UDS negative   EKG pending   Patient is medically cleared for admission to U and treatment of underlying psychiatric illness based on available results  Please contact [facility] with any questions or concerns

## 2024-05-15 NOTE — PROGRESS NOTES
05/15/24 0912   Team Meeting   Meeting Type Daily Rounds   Team Members Present   Team Members Present Physician;Nurse;   Physician Team Member Lucero   [facility] Noa   Patient/Family Present   Patient Present No   Patient's Family Present No     Pt is a 38 y/o male coming in on a 201, due to increased SI with no plan. Pt's readmit score is 16. Pt reported that his main trigger/ stressor is that his Mother recently passed away. Pt reported that he has had depression since he was 9 y/o. Pt was offered melatonin to help him sleep and Pt refused. Pt denied SI/HI/AVH. Pt's discharge is to be determined.

## 2024-05-15 NOTE — NURSING NOTE
"PT observed briefly in the patient lounge area watching TV, offered scheduled \"melatonin 3mg\"  PT refused stating \"I don't need any thing to put to sleep\"  encouraged to reach out to staff if his changes his mind or if unable to sleep, verbalized understanding. PT denies SI/HI/VH/AH at the time of assessment.   "

## 2024-05-15 NOTE — H&P
".Psychiatric Evaluation - Behavioral Health   Franco Roberson 39 y.o. male MRN: [id number]  Unit/Bed#: U 352-02 Encounter: [id number]    Assessment & Plan   Principal Problem:    Unspecified depressive disorder (HCC)      Plan:   Pt is currently hesitant to start any medications  Will continue to develop therapeutic rapport with goal to start 25 mg Sertraline po daily tomorrow.  Frequent safety checks and vitals per unit protocol.  Case discussed with treatment team.  Treatment options and alternatives were reviewed with the patient.      -----------------------------------    Chief Complaint: \"I wanted to die\"    History of Present Illness     Franco is a 39 y.o. male with history of unspecified depression and anxiety who presents voluntarily via a 201 for SI with a vague plan to jump off a bridge or into traffic.    He was initially encountered in [facility] ED on 5/14 for SI. He was seen by a crisis worker and was cooperative on exam. He was agreeable to be transferred to the inpatient behavioral health unit and signed a 201. On arrival to the unit patient had no acute behavioral issues and has a blunted affect as well is guarded in discussing his history with staff.    Franco states that he has been feeling increasingly hopeless since he was evicted from his apartment on 5/14, and that suicide seemed like his only option. He previously lived with his mother in the apartment and has not been able to pay rent since her passing on December 1st 2024. He notes that he has had depression and anxiety since he was a young boy at about 4 years of age, but was only treated briefly with Prozac and Xanax in his teenage years. He was not able to tolerate either medication as the Prozac made him \"verbally aggressive\" and the Xanax made him \"too sleepy.\" He has never been on an inpatient psychiatric unit and has not been followed by a psychiatrist for over 25 years. He notes that the depression has persisted with him most of " his life but up until losing his mother and becoming homeless that it was not something that interfered with his life very much. His only suicide attempt was 2 months ago when he took a bunch of sleeping pills since he was dreading becoming homeless. He did not seek any medical attention for this intentional overdose. He denies any history of marga, hallucinations, delusions or self harm behavior. He has never drank any alcohol, used any tobacco nor used any other substances. He has a poor social support network and states that he only has one friend who is disabled. He notes that he has always been content with being alone and that even when his mother was alive he would often spend all day in his room alone watching movies and playing video games. In the past two weeks Franco has been experiencing some guilt about not spending as much time with his mother before her passing but denies difficulty sleeping, appetite changes, anhedonia or difficulty concentrating. At present Franco denies any SI, HI, or AVH. Franco notes that if he were outside of the hospital and homeless again that he would likely try to end his life, though does contract for safety on the unit.    Medical Review Of Systems:  pain in his right shoulder that is present at baseline, Complete review of systems is negative except as noted above.    Psychiatric Review Of Systems:  Problems with sleep: no  Appetite changes: no  Weight changes: no  Low energy/anergy: yes  Low interest/pleasure/anhedonia: no  Somatic symptoms: no  Anxiety/panic: yes, his anxiety has been worsening since his mother's death  Marga: no, patient adamantly denies any period of time where he experienced increased energy and decreased need for sleep.  Guilt/hopeless: yes, hopeless about his homelessness and ability to become employed.  Self injurious behavior/risky behavior: yes, patient remains a danger to himself.    Historical Information     Psychiatric History:   Prior  psychiatric diagnoses: Unspecified Depression and Anxiety disorders  Inpatient hospitalizations: patient denies  Suicide attempts: First and only suicide attempt 2 months ago via intentional overdose on sleeping pills.  Self-harm behaviors: patient denies  Violent behavior: patient denies  Outpatient treatment: None at present. Patient reports brief treatment about 25 years ago. He has never went to therapy.  Psychiatric medication trial:   Prozac - unable to tolerate because made him verbally aggressive as a teenager.  Xanax - unable to tolerate because made him excessively sleepy as a teenager.    Substance Abuse History:  Social History     Tobacco Use    Smoking status: Never   Vaping Use    Vaping status: Never Used   Substance Use Topics    Alcohol use: No    Drug use: No      Patient denies use of tobacco, alcohol, or illicit drugs.   I have assessed this patient for substance use within the past 12 months.    Family Psychiatric History:   Patient denies any known family history of psychiatric illness, suicide attempt, or substance abuse.    Social History  Marital history: Single  Children: no  Living arrangement: Presently homeless as of 5/14/2024.  Functioning Relationships: alone & isolated and poor support system  Education: high school diploma/GED  Occupational History: unemployed  Other Pertinent History: Violence: none, Legal:  none ,  Service: none, Education: denies special education or IEP  Seizures: none  Head trauma with LOC: none        Traumatic History:   Abuse: sexual: none, physical: none, emotional: none, and verbal: none  Other Traumatic Events:  His mother passed in December 2024 and patient had to make the decision to withdrawal life sustaining measures.    Past Medical History:   Past Medical History:   Diagnosis Date    Depression     Psychiatric disorder     Suicide attempt (HCC)         -----------------------------------  Objective    Temp:  [97.4 °F (36.3 °C)-98.3 °F (36.8  °C)] 97.7 °F (36.5 °C)  HR:  [70-75] 70  Resp:  [16] 16  BP: (135-140)/(81-93) 137/86    Mental Status Exam:  Appearance:  Marginal grooming/hygiene, disheveled, obese   Behavior:  calm, cooperative, and poor eye contact.   Motor: Psychomotor slowing and normal gait and balance   Speech:  normal rate, normal volume, and coherent   Mood:  depressed   Affect:  blunted   Thought Process:  organized, logical   Thought Content: no verbalized delusions or overt paranoia   Perceptual disturbances: auditory hallucinations denies any at present, visual hallucinations denies any at present, and does not appear to be responding to internal stimuli at this time   Risk Potential: No active or passive suicidal or homicidal ideation was verbalized during interview, Recent vague SI about jumping in front of car or off bridge while he was homeless.   Cognition: oriented to self and situation, memory grossly intact, and cognition not formally tested   Insight:  Limited   Judgment: Limited       Meds/Allergies   No Known Allergies  all current active meds have been reviewed    Behavioral Health Medications: all current active meds have been reviewed. Changes as in Plan section above.    Laboratory results:  I have personally reviewed all pertinent laboratory/tests results.  Recent Results (from the past 48 hour(s))   CBC and differential    Collection Time: 05/14/24  5:41 AM   Result Value Ref Range    WBC 7.01 4.31 - 10.16 Thousand/uL    RBC 4.87 3.88 - 5.62 Million/uL    Hemoglobin 15.1 12.0 - 17.0 g/dL    Hematocrit 46.1 36.5 - 49.3 %    MCV 95 82 - 98 fL    MCH 31.0 26.8 - 34.3 pg    MCHC 32.8 31.4 - 37.4 g/dL    RDW 12.9 11.6 - 15.1 %    MPV 8.8 (L) 8.9 - 12.7 fL    Platelets 273 149 - 390 Thousands/uL    nRBC 0 /100 WBCs    Segmented % 62 43 - 75 %    Immature Grans % 0 0 - 2 %    Lymphocytes % 28 14 - 44 %    Monocytes % 9 4 - 12 %    Eosinophils Relative 1 0 - 6 %    Basophils Relative 0 0 - 1 %    Absolute Neutrophils 4.30  1.85 - 7.62 Thousands/µL    Absolute Immature Grans 0.01 0.00 - 0.20 Thousand/uL    Absolute Lymphocytes 1.98 0.60 - 4.47 Thousands/µL    Absolute Monocytes 0.61 0.17 - 1.22 Thousand/µL    Eosinophils Absolute 0.08 0.00 - 0.61 Thousand/µL    Basophils Absolute 0.03 0.00 - 0.10 Thousands/µL   Comprehensive metabolic panel    Collection Time: 05/14/24  5:41 AM   Result Value Ref Range    Sodium 139 135 - 147 mmol/L    Potassium 3.7 3.5 - 5.3 mmol/L    Chloride 104 96 - 108 mmol/L    CO2 26 21 - 32 mmol/L    ANION GAP 9 4 - 13 mmol/L    BUN 12 5 - 25 mg/dL    Creatinine 0.95 0.60 - 1.30 mg/dL    Glucose 100 65 - 140 mg/dL    Calcium 9.7 8.4 - 10.2 mg/dL    AST 22 13 - 39 U/L    ALT 20 7 - 52 U/L    Alkaline Phosphatase 64 34 - 104 U/L    Total Protein 8.1 6.4 - 8.4 g/dL    Albumin 4.6 3.5 - 5.0 g/dL    Total Bilirubin 0.78 0.20 - 1.00 mg/dL    eGFR 100 ml/min/1.73sq m   Ethanol    Collection Time: 05/14/24  5:41 AM   Result Value Ref Range    Ethanol Lvl <10 <10 mg/dL   Rapid drug screen, urine    Collection Time: 05/14/24 12:52 PM   Result Value Ref Range    Amph/Meth UR Negative Negative    Barbiturate Ur Negative Negative    Benzodiazepine Urine Negative Negative    Cocaine Urine Negative Negative    Methadone Urine Negative Negative    Opiate Urine Negative Negative    PCP Ur Negative Negative    THC Urine Negative Negative    Oxycodone Urine Negative Negative    Fentanyl Urine Negative Negative    HYDROCODONE URINE Negative Negative   UA (URINE) with reflex to Scope    Collection Time: 05/14/24 12:52 PM   Result Value Ref Range    Color, UA Yellow     Clarity, UA Clear     Specific Gravity, UA >=1.030 1.005 - 1.030    pH, UA 6.0 4.5, 5.0, 5.5, 6.0, 6.5, 7.0, 7.5, 8.0    Leukocytes, UA Negative Negative    Nitrite, UA Negative Negative    Protein, UA Negative Negative mg/dl    Glucose, UA Negative Negative mg/dl    Ketones, UA Negative Negative mg/dl    Urobilinogen, UA <2.0 <2.0 mg/dl mg/dl    Bilirubin, UA  Negative Negative    Occult Blood, UA Negative Negative   Comprehensive metabolic panel    Collection Time: 05/15/24  6:28 AM   Result Value Ref Range    Sodium 142 135 - 147 mmol/L    Potassium 4.1 3.5 - 5.3 mmol/L    Chloride 106 96 - 108 mmol/L    CO2 28 21 - 32 mmol/L    ANION GAP 8 4 - 13 mmol/L    BUN 13 5 - 25 mg/dL    Creatinine 0.97 0.60 - 1.30 mg/dL    Glucose 89 65 - 140 mg/dL    Glucose, Fasting 89 65 - 99 mg/dL    Calcium 9.3 8.4 - 10.2 mg/dL    AST 21 13 - 39 U/L    ALT 19 7 - 52 U/L    Alkaline Phosphatase 66 34 - 104 U/L    Total Protein 7.6 6.4 - 8.4 g/dL    Albumin 4.3 3.5 - 5.0 g/dL    Total Bilirubin 0.69 0.20 - 1.00 mg/dL    eGFR 97 ml/min/1.73sq m   CBC and differential    Collection Time: 05/15/24  6:28 AM   Result Value Ref Range    WBC 5.42 4.31 - 10.16 Thousand/uL    RBC 5.05 3.88 - 5.62 Million/uL    Hemoglobin 16.1 12.0 - 17.0 g/dL    Hematocrit 49.4 (H) 36.5 - 49.3 %    MCV 98 82 - 98 fL    MCH 31.9 26.8 - 34.3 pg    MCHC 32.6 31.4 - 37.4 g/dL    RDW 12.6 11.6 - 15.1 %    MPV 9.2 8.9 - 12.7 fL    Platelets 268 149 - 390 Thousands/uL    nRBC 0 /100 WBCs    Segmented % 53 43 - 75 %    Immature Grans % 0 0 - 2 %    Lymphocytes % 38 14 - 44 %    Monocytes % 7 4 - 12 %    Eosinophils Relative 2 0 - 6 %    Basophils Relative 0 0 - 1 %    Absolute Neutrophils 2.83 1.85 - 7.62 Thousands/µL    Absolute Immature Grans 0.01 0.00 - 0.20 Thousand/uL    Absolute Lymphocytes 2.06 0.60 - 4.47 Thousands/µL    Absolute Monocytes 0.40 0.17 - 1.22 Thousand/µL    Eosinophils Absolute 0.10 0.00 - 0.61 Thousand/µL    Basophils Absolute 0.02 0.00 - 0.10 Thousands/µL   TSH, 3rd generation with Free T4 reflex    Collection Time: 05/15/24  6:28 AM   Result Value Ref Range    TSH 3RD GENERATON 5.162 (H) 0.450 - 4.500 uIU/mL   Vitamin B12    Collection Time: 05/15/24  6:28 AM   Result Value Ref Range    Vitamin B-12 168 (L) 180 - 914 pg/mL   Folate    Collection Time: 05/15/24  6:28 AM   Result Value Ref Range     Folate >22.3 >5.9 ng/mL   Vitamin D 25 hydroxy    Collection Time: 05/15/24  6:28 AM   Result Value Ref Range    Vit D, 25-Hydroxy 11.6 (L) 30.0 - 100.0 ng/mL   Lipid panel    Collection Time: 05/15/24  6:28 AM   Result Value Ref Range    Cholesterol 191 See Comment mg/dL    Triglycerides 116 See Comment mg/dL    HDL, Direct 44 >=40 mg/dL    LDL Calculated 124 (H) 0 - 100 mg/dL    Non-HDL-Chol (CHOL-HDL) 147 mg/dl   T4, free    Collection Time: 05/15/24  6:28 AM   Result Value Ref Range    Free T4 0.60 (L) 0.61 - 1.12 ng/dL              -----------------------------------    Risks / Benefits of Treatment:  Risks, benefits, and possible side effects of medications were explained to patient. The patient was hesitant to start any medications at present.     Counseling / Coordination of Care:  Patient's presentation on admission and proposed treatment plan were discussed with the treatment team.  Diagnosis, medication changes and treatment plan were reviewed with the patient.  Recent stressors were discussed with the patient.  Events leading to admission were reviewed with the patient.  Importance of medication and treatment compliance was reviewed with the patient.            Inpatient Psychiatric Certification:     Certification: Based upon physical, mental and social evaluations, I certify that inpatient psychiatric services are medically necessary for this patient for a duration of 7 midnights for the treatment of Unspecified mood (affective) disorder (HCC). Available alternative community resources do not meet the patient's mental health care needs. I further attest that an established written individualized plan of care has been implemented and is outlined in the patient's medical records.      Esteban Candelaria

## 2024-05-15 NOTE — PLAN OF CARE
Problem: Ineffective Coping  Goal: Cooperates with admission process  Description: Interventions:   - Complete admission process  5/15/2024 0213 by Carley Alonso RN  Outcome: Not Progressing  5/15/2024 0212 by Carley Alonso RN  Outcome: Not Progressing  Goal: Identifies ineffective coping skills  5/15/2024 0213 by Carley Alonso RN  Outcome: Not Progressing  5/15/2024 0212 by Carley Alonso RN  Outcome: Not Progressing  Goal: Identifies healthy coping skills  5/15/2024 0213 by Carley Alonso RN  Outcome: Not Progressing  5/15/2024 0212 by Carley Aolnso RN  Outcome: Not Progressing  Goal: Demonstrates healthy coping skills  5/15/2024 0213 by Carley Alonso RN  Outcome: Not Progressing  5/15/2024 0212 by Carley Alonso RN  Outcome: Progressing  Goal: Participates in unit activities  Description: Interventions:  - Provide therapeutic environment   - Provide required programming   - Redirect inappropriate behaviors   5/15/2024 0213 by Carley Alonso RN  Outcome: Not Progressing  5/15/2024 0212 by Carley Alonso RN  Outcome: Progressing     Problem: Risk for Self Injury/Neglect  Goal: Treatment Goal: Remain safe during length of stay, learn and adopt new coping skills, and be free of self-injurious ideation, impulses and acts at the time of discharge  5/15/2024 0213 by Carley Alonso RN  Outcome: Not Progressing  5/15/2024 0212 by Carley Alonso RN  Outcome: Progressing     Problem: Anxiety  Goal: Anxiety is at manageable level  Description: Interventions:  - Assess and monitor patient's anxiety level.   - Monitor for signs and symptoms (heart palpitations, chest pain, shortness of breath, headaches, nausea, feeling jumpy, restlessness, irritable, apprehensive).   - Collaborate with interdisciplinary team and initiate plan and interventions as ordered.  - [place] patient to unit/surroundings  - Explain treatment plan  - Encourage participation in care  - Encourage verbalization of concerns/fears  - Identify coping mechanisms  -  Assist in developing anxiety-reducing skills  - Administer/offer alternative therapies  - Limit or eliminate stimulants  5/15/2024 0213 by Carley Alonso, RN  Outcome: Not Progressing  5/15/2024 0212 by Carley Alonso, RN  Outcome: Progressing

## 2024-05-15 NOTE — TREATMENT PLAN
TREATMENT PLAN REVIEW - Behavioral Health Franco Roberson 39 y.o. 1985 male MRN: [id number]    [facility] 3B BEHAVIOR[facility] Room / Bed: [facility] 352/[facility] 352-02 Encounter: [id number]          Admit Date/Time:  5/14/2024  4:45 PM    Treatment Team:   MD Fernando Vargas, WALLY Garcia    Diagnosis: Principal Problem:    Unspecified depressive disorder (HCC)  Active Problems:    Medical clearance for psychiatric admission    Hyperlipidemia    Vitamin D deficiency    B12 deficiency    Hypothyroidism      Patient Strengths/Assets: ability for insight, cooperative, communication skills, negotiates basic needs, patient is on a voluntary commitment, patient is willing to work on problems    Patient Barriers/Limitations: difficulty adapting, financial instability, homeless, lack of stable employment, poor past treatment response, poor self-care, poor support system    Short Term Goals: decrease in depressive symptoms, decrease in anxiety symptoms, decrease in suicidal thoughts, ability to stay safe on the unit, ability to stay free of restraints, improvement in ability to express basic needs, improvement in insight, improvement in reasoning ability, improvement in self care, sleep improvement, improvement in appetite, mood stabilization, increase in group attendance, increase in socialization with peers on the unit, acceptance of need for psychiatric treatment, acceptance of psychiatric medications    Long Term Goals: improvement in depression, improvement in anxiety, free of suicidal thoughts, free of homicidal thoughts, no self abusive behavior, improvement in reasoning ability, improved insight, able to express basic needs, acceptance of need for psychiatric medications, acceptance of need for psychiatric treatment, acceptance of  need for psychiatric follow up after discharge, acceptance of psychiatric diagnosis, adequate self care, adequate sleep, adequate appetite, appropriate interaction with peers, stable living arrangements upon discharge    Progress Towards Goals: starting psychiatric medications as prescribed, progressing, no longer suicidal, working on coping skills, currently hesitant to start medications    Recommended Treatment: medication management, patient medication education, group therapy, milieu therapy, continued Behavioral Health psychiatric evaluation/assessment process    Treatment Frequency: daily medication monitoring, group and milieu therapy daily, monitoring through interdisciplinary rounds, monitoring through weekly patient care conferences    Expected Discharge Date:  5/25/24    Discharge Plan: placement in alternative living arrangement, referral for outpatient medication management with a psychiatrist, referral for outpatient psychotherapy    Treatment Plan Created/Updated By: Basilio Panda MD

## 2024-05-15 NOTE — CONSULTS
[facility]  Consult  Name: Franco Roberson 39 y.o. male I MRN: [id number]  Unit/Bed#: [facility] 352-02 I Date of Admission: 5/14/2024   Date of Service: 5/15/2024 I Hospital Day: 1    Inpatient consult for Medical Clearance for  patient  Consult performed by: WALLY Baptiste  Consult ordered by: Basilio Brown MD          Assessment & Plan   Medical clearance for psychiatric admission  Assessment & Plan  Admission labs: CBC and CMP acceptable  LDL elevated   TSH high with a low free T4  B12 and vitamin D low   Vitals stable   UA unremarkable   UDS negative   EKG pending   Patient is medically cleared for admission to [facility] and treatment of underlying psychiatric illness based on available results  Please contact PATRICIA with any questions or concerns    Hypothyroidism  Assessment & Plan  TSH 5.162, Free T4 0.60  Will start Synthroid 25 mcg daily  Recheck TSH in 8 weeks     B12 deficiency  Assessment & Plan  Noted on admission labs   Will replete with Vitamin B12    Vitamin D deficiency  Assessment & Plan  Noted on admission labs  Will replete with Vitamin D    Hyperlipidemia  Assessment & Plan  Lipid panel: triglycerides 116, HDL 44,    Will add statin  Patient will need repeat LFTs and lipid panel in 8 weeks which can be done outpatient      * Unspecified depressive disorder (HCC)  Assessment & Plan  [facility]  Management per primary service            Recommendations for Discharge:  SLIM will sign off - please call with questions or concerns.  Follow up with PCP upon discharge.     Counseling / Coordination of Care Time: 30 minutes.  Greater than 50% of total time spent on patient counseling and coordination of care.    Collaboration of Care: Were Recommendations Directly Discussed with Primary Treatment Team? - Yes     History of Present Illness:    Franco Roberson is a 39 y.o. male with no significant past medical history who is originally admitted to the psychiatric  service due to suicide ideation. We are consulted for medical clearance for psychiatric hospitalization and medical management.  Patient initially presented to [facility] ED.  Patient presented with suicide ideation. Patient was seen by Crisis. He signed a 201 and [facility]. Currently, he is calm and cooperative. He offers no complaints.     Review of Systems:    Review of Systems   Constitutional:  Negative for appetite change and chills.   HENT:  Negative for congestion and sore throat.    Eyes:  Negative for visual disturbance.   Respiratory:  Negative for cough and shortness of breath.    Cardiovascular:  Negative for chest pain.   Gastrointestinal:  Negative for abdominal pain, constipation, diarrhea, nausea and vomiting.   Genitourinary:  Negative for difficulty urinating.   Musculoskeletal:  Negative for gait problem.   Skin:  Negative for wound.   Neurological:  Negative for dizziness, light-headedness and headaches.       Past Medical and Surgical History:     Past Medical History:   Diagnosis Date    Depression     Psychiatric disorder     Suicide attempt (HCC)        No past surgical history on file.    Meds/Allergies:    all medications and allergies reviewed    Allergies: No Known Allergies    Social History:     Marital Status: Single    Substance Use History:   Social History     Substance and Sexual Activity   Alcohol Use No     Social History     Tobacco Use   Smoking Status Never   Smokeless Tobacco Not on file     Social History     Substance and Sexual Activity   Drug Use No       Family History:    History reviewed. No pertinent family history.    Physical Exam:     Vitals:   Blood Pressure: 137/86 (05/15/24 1204)  Pulse: 70 (05/15/24 1204)  Temperature: 97.7 °F (36.5 °C) (05/15/24 1204)  Temp Source: Temporal (05/15/24 1204)  Respirations: 16 (05/15/24 1204)  Height: 6' (182.9 cm) (05/14/24 1645)  Weight - Scale: 126 kg (277 lb) (05/14/24 1645)  SpO2: 97 % (05/15/24  "1204)    Physical Exam  Vitals and nursing note reviewed.   Constitutional:       General: He is not in acute distress.     Appearance: He is not toxic-appearing or diaphoretic.   HENT:      Head: Normocephalic.      Mouth/Throat:      Mouth: Mucous membranes are moist.   Eyes:      Conjunctiva/sclera: Conjunctivae normal.   Cardiovascular:      Rate and Rhythm: Normal rate.   Pulmonary:      Effort: Pulmonary effort is normal.      Breath sounds: Normal breath sounds.   Abdominal:      General: Bowel sounds are normal.      Palpations: Abdomen is soft.   Musculoskeletal:         General: Normal range of motion.      Cervical back: Normal range of motion.      Right lower leg: No edema.      Left lower leg: No edema.   Skin:     General: Skin is warm and dry.      Capillary Refill: Capillary refill takes less than 2 seconds.   Neurological:      Mental Status: He is alert and oriented to person, place, and time. Mental status is at baseline.   Psychiatric:         Mood and Affect: Mood is depressed. Affect is flat.         Speech: Speech normal.         Behavior: Behavior is cooperative.         Additional Data:     Lab Results:     Results from last 7 days   Lab Units 05/15/24  0628   WBC Thousand/uL 5.42   HEMOGLOBIN g/dL 16.1   HEMATOCRIT % 49.4*   PLATELETS Thousands/uL 268   SEGS PCT % 53   LYMPHO PCT % 38   MONO PCT % 7   EOS PCT % 2     Results from last 7 days   Lab Units 05/15/24  0628   SODIUM mmol/L 142   POTASSIUM mmol/L 4.1   CHLORIDE mmol/L 106   CO2 mmol/L 28   BUN mg/dL 13   CREATININE mg/dL 0.97   ANION GAP mmol/L 8   CALCIUM mg/dL 9.3   ALBUMIN g/dL 4.3   TOTAL BILIRUBIN mg/dL 0.69   ALK PHOS U/L 66   ALT U/L 19   AST U/L 21   GLUCOSE RANDOM mg/dL 89             No results found for: \"HGBA1C\"            Imaging:  no new imaging to review     No orders to display       EKG, Pathology, and Other Studies Reviewed on Admission:   EKG: see above documentation    ** Please Note: This note has been " constructed using a voice recognition system. **

## 2024-05-15 NOTE — NURSING NOTE
Pt refused scheduled B12 PO and IM as well as Vitamin D. Pt was educated in importance of order and risks associated with refusal as well as how refusal may impact overall wellness. Pt verbalizes understanding and confirms refusal.

## 2024-05-15 NOTE — NURSING NOTE
Pt verbally denies SI, HI and A/V hallucinations. Ongoing depression and anxiety that is controlled. Pt discussed with RN his reservations with taking medications and is not sure he is open to the idea of starting meds. Is otherwise cooperative. Affect is flat; pt is visible but quiet and introverted. No complaints or concerns vocalized today.

## 2024-05-15 NOTE — NURSING NOTE
Pt refused scheduled Lipitor 10mg. Pt was educated in importance of order and risks associated with refusal as well as how refusal may impact treatment. Pt verbalizes understanding and confirms refusal.

## 2024-05-15 NOTE — ASSESSMENT & PLAN NOTE
Lipid panel: triglycerides 116, HDL 44,    Will add statin  Patient will need repeat LFTs and lipid panel in 8 weeks which can be done outpatient

## 2024-05-16 LAB
ATRIAL RATE: 61 BPM
P AXIS: 56 DEGREES
PR INTERVAL: 188 MS
QRS AXIS: 50 DEGREES
QRSD INTERVAL: 92 MS
QT INTERVAL: 426 MS
QTC INTERVAL: 428 MS
T WAVE AXIS: 46 DEGREES
VENTRICULAR RATE: 61 BPM

## 2024-05-16 PROCEDURE — 93010 ELECTROCARDIOGRAM REPORT: CPT | Performed by: INTERNAL MEDICINE

## 2024-05-16 PROCEDURE — 99232 SBSQ HOSP IP/OBS MODERATE 35: CPT | Performed by: STUDENT IN AN ORGANIZED HEALTH CARE EDUCATION/TRAINING PROGRAM

## 2024-05-16 RX ORDER — SERTRALINE HYDROCHLORIDE 25 MG/1
25 TABLET, FILM COATED ORAL DAILY
Status: DISCONTINUED | OUTPATIENT
Start: 2024-05-16 | End: 2024-05-21

## 2024-05-16 RX ADMIN — MELATONIN TAB 3 MG 3 MG: 3 TAB at 21:42

## 2024-05-16 RX ADMIN — SERTRALINE HYDROCHLORIDE 25 MG: 25 TABLET ORAL at 16:27

## 2024-05-16 NOTE — PROGRESS NOTES
05/16/24 0924   Team Meeting   Meeting Type Daily Rounds   Team Members Present   Team Members Present Physician;Nurse;   Physician Team Member Lucero   Nursing Team Member Tamia   Care Management Team Member paola   Patient/Family Present   Patient Present No   Patient's Family Present No     Pt med/meal compliant. Visible on the unit. Declined medication for sleep. Discharge tbd.

## 2024-05-16 NOTE — SOCIAL WORK
Admission Status    Status of admission 201   [facility] Robin     Patient Intake   Address to discharge to Pt was recently evicted from his apartment for failure to pay rent   Living Arrangement Pt currently homeless following eviction   Can patient return home TBD   Patient's Telephone Number Pt states he does not currently have a phone   Patient's e-mail Address Denies   Insurance MA John   PCP Denies   Education GED   Type of work Unemployed, No income    History Denies   Access to Firearms Denies   Marital Status/Children Single, no chldren   Spirituality/Latter day Denies   Transportation Drives self, unsure if he still has a car   Preferred Pharmacy [facility]      Patient History   Presenting Problem SI with plan to jump off a bridge or into traffic   Stressor/Trigger Death of his mother, eviction, financial difficulty   Treatment History Denies   Current psychiatrist/therapist Denies   ACT/ICM Denies   Family History of Mental Health Denies   Suicide Attempts OD on sleeping pills - 2 months ago and starvation   Legal Issues Denies   Trauma/Psychosocial loss Denies trauma hx  Mother passed in December 2023     Substance Abuse Assessment   UDS: negative  Audit Score: 0  Nicotine/Tobacco:Denies   Substance First use Last Use and amount Frequency Amount Used How long Longest period of sobriety and when Method of use   THC   Denies use         Heroin   Denies use         Cocaine   Denies use         ETOH   Denies use         Meth   Denies use         Benzos   Denies use         Other:   Denies use         History of BOB Denies   Family History of BOB Denies   Prior Inpatient BOB Treatment Denies   Current Outpatient treatment Denies   Response to Referral N/A      Referrals/ROIs   Referrals Needed Psychiatry and Therapy, , Housing Referrals   ROIs Signed [facility])

## 2024-05-16 NOTE — NURSING NOTE
Pt is visible on the unit, guarded and uninterested in treatment. Pt denies all psych symptoms. Pt not cooperative with medication but did eat meals. Denies any needs at this time.

## 2024-05-16 NOTE — PROGRESS NOTES
".Progress Note - Behavioral Health   Franco Roberson 39 y.o. male MRN: [id number]  Unit/Bed#: [facility] 352-02 Encounter: [id number]    Assessment & Plan   Principal Problem:    Unspecified depressive disorder (HCC)  Active Problems:    Medical clearance for psychiatric admission    Hyperlipidemia    Vitamin D deficiency    B12 deficiency    Hypothyroidism      Recommended Treatment:   Start Sertraline 25 mg po daily  Encourage group therapy attendance  Continue frequent safety checks and vitals per unit protocol.  Case discussed with treatment team.  Risks, benefits and possible side effects of Medications: Risks, benefits, and possible side effects of medications have been explained to the patient, who verbalizes understanding    ------------------------------------------------------------    Subjective: Per nursing report, Franco has been doing fairly well and is cooperative on the unit. He has not been compliant with scheduled medications.  Today, Franco is consenting for safety on the unit. He reports that his mood become worse last night since he felt increasingly hopeless about his future. Franco is not currently expressing any SI but fears that if he becomes homeless again that he will commit suicide. He had difficulty falling asleep last night since he had frequent intrusive thoughts about becoming homeless but denied his scheduled melatonin, since he is \"very averse\" to any medications. Eventually he was able to fall asleep and slept continuously for 10 hours. His appetite remains at baseline. He did not attend any groups yesterday but agreed to attend today and will follow up with me tomorrow on the coping skills he learned. Franco notes that he is always very averse to medications since he fears side effects, especially since his attempted overdose on sleeping pills 2 months ago. We discussed at length the benefits/risks of starting low dose Sertraline and the pt was ultimately agreeable to start Sertaline as long " "as he is monitored for side effects. I provided reassurance that he would be monitored for any adverse effects and that he can refuse the medication at any time if he sees fit. We discussed his employment history. 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 He has not worked since and is worried about being able to find a job again. Today Franco denies any SI, HI, AVH and agrees that he will contract for safety on the unit.     Progress Toward Goals: Attend group therapy for slow improvement of depressive symptoms.    Psychiatric Review of Systems:  Behavior over the last 24 hours: unchanged  Sleep: difficulty falling asleep  Appetite: adequate, normal compared to baseline  Medication side effects:  none  ROS:  mild right shoulder pain which is unchanged from baseline, Complete review of systems is negative except as noted above.    Vital signs in last 24 hours:  Temp:  [96.7 °F (35.9 °C)-97.7 °F (36.5 °C)] 97.3 °F (36.3 °C)  HR:  [65-76] 76  Resp:  [16-18] 18  BP: (126-137)/(83-87) 126/87    Mental Status Exam:  Appearance:  marginal grooming/hygiene, disheveled, and obese   Behavior:  calm, cooperative, and poor eye contact   Motor: normal gait and balance and psychomotor slowing   Speech:  normal rate, normal volume, and coherent   Mood:  depressed and \"hopeless\"   Affect:  blunted   Thought Process:  Organized, logical, goal-directed   Thought Content: no verbalized delusions or overt paranoia   Perceptual disturbances: denies current hallucinations and does not appear to be responding to internal stimuli at this time   Risk Potential: No active or passive suicidal or homicidal ideation was verbalized during interview   Cognition: oriented to self and situation and cognition not formally tested   Insight:  Limited   Judgment: Limited     Current Medications:  Current Facility-Administered Medications   Medication Dose Route Frequency Provider Last Rate    " acetaminophen  650 mg Oral Q6H PRN Basilio Brown MD      acetaminophen  650 mg Oral Q4H PRN Basilio Brown MD      acetaminophen  975 mg Oral Q6H PRN Basilio Brown MD      aluminum-magnesium hydroxide-simethicone  30 mL Oral Q4H PRN Basilio Brown MD      Artificial Tears  1 drop Both Eyes Q3H PRN Basilio Brown MD      atorvastatin  10 mg Oral Daily With Dinner WALLY Baptiste      benztropine  1 mg Intramuscular Q4H PRN Max 6/day Basilio Borwn MD      benztropine  1 mg Oral Q4H PRN Max 6/day Basilio Brown MD      cyanocobalamin  1,000 mcg Oral Daily WALLY Baptiste      cyanocobalamin  1,000 mcg Intramuscular Once WALLY Baptiste      hydrOXYzine HCL  50 mg Oral Q6H PRN Max 4/day Basilio Brown MD      Or    diphenhydrAMINE  50 mg Intramuscular Q6H PRN Basilio Brown MD      ergocalciferol  50,000 Units Oral Weekly WALLY Baptiste      hydrOXYzine HCL  100 mg Oral Q6H PRN Max 4/day Basilio Brown MD      Or    LORazepam  2 mg Intramuscular Q6H PRN Basilio Brown MD      hydrOXYzine HCL  25 mg Oral Q6H PRN Max 4/day Basilio Brown MD      levothyroxine  25 mcg Oral Early Morning WALLY Baptiste      melatonin  3 mg Oral HS Basilio Brown MD      OLANZapine  5 mg Oral Q4H PRN Max 3/day Basilio Brown MD      Or    OLANZapine  2.5 mg Intramuscular Q4H PRN Max 3/day Basilio Brown MD      OLANZapine  5 mg Oral Q3H PRN Max 3/day Basilio Brown MD      Or    OLANZapine  5 mg Intramuscular Q3H PRN Max 3/day Basilio Brown MD      OLANZapine  2.5 mg Oral Q4H PRN Max 6/day Basilio Brown MD      polyethylene glycol  17 g Oral Daily PRN Basilio Brown MD      propranolol  10 mg Oral Q8H PRN Basilio Brown MD      senna-docusate sodium  1 tablet Oral Daily PRN Basilio Brown MD      traZODone  50 mg Oral HS PRN Basilio Brown MD         Behavioral  Health Medications: all current active meds have been reviewed. Changes as in plan section above.    Laboratory results:  I have personally reviewed all pertinent laboratory/tests results.  Recent Results (from the past 48 hour(s))   Rapid drug screen, urine    Collection Time: 05/14/24 12:52 PM   Result Value Ref Range    Amph/Meth UR Negative Negative    Barbiturate Ur Negative Negative    Benzodiazepine Urine Negative Negative    Cocaine Urine Negative Negative    Methadone Urine Negative Negative    Opiate Urine Negative Negative    PCP Ur Negative Negative    THC Urine Negative Negative    Oxycodone Urine Negative Negative    Fentanyl Urine Negative Negative    HYDROCODONE URINE Negative Negative   UA (URINE) with reflex to Scope    Collection Time: 05/14/24 12:52 PM   Result Value Ref Range    Color, UA Yellow     Clarity, UA Clear     Specific Gravity, UA >=1.030 1.005 - 1.030    pH, UA 6.0 4.5, 5.0, 5.5, 6.0, 6.5, 7.0, 7.5, 8.0    Leukocytes, UA Negative Negative    Nitrite, UA Negative Negative    Protein, UA Negative Negative mg/dl    Glucose, UA Negative Negative mg/dl    Ketones, UA Negative Negative mg/dl    Urobilinogen, UA <2.0 <2.0 mg/dl mg/dl    Bilirubin, UA Negative Negative    Occult Blood, UA Negative Negative   Comprehensive metabolic panel    Collection Time: 05/15/24  6:28 AM   Result Value Ref Range    Sodium 142 135 - 147 mmol/L    Potassium 4.1 3.5 - 5.3 mmol/L    Chloride 106 96 - 108 mmol/L    CO2 28 21 - 32 mmol/L    ANION GAP 8 4 - 13 mmol/L    BUN 13 5 - 25 mg/dL    Creatinine 0.97 0.60 - 1.30 mg/dL    Glucose 89 65 - 140 mg/dL    Glucose, Fasting 89 65 - 99 mg/dL    Calcium 9.3 8.4 - 10.2 mg/dL    AST 21 13 - 39 U/L    ALT 19 7 - 52 U/L    Alkaline Phosphatase 66 34 - 104 U/L    Total Protein 7.6 6.4 - 8.4 g/dL    Albumin 4.3 3.5 - 5.0 g/dL    Total Bilirubin 0.69 0.20 - 1.00 mg/dL    eGFR 97 ml/min/1.73sq m   CBC and differential    Collection Time: 05/15/24  6:28 AM   Result Value  Ref Range    WBC 5.42 4.31 - 10.16 Thousand/uL    RBC 5.05 3.88 - 5.62 Million/uL    Hemoglobin 16.1 12.0 - 17.0 g/dL    Hematocrit 49.4 (H) 36.5 - 49.3 %    MCV 98 82 - 98 fL    MCH 31.9 26.8 - 34.3 pg    MCHC 32.6 31.4 - 37.4 g/dL    RDW 12.6 11.6 - 15.1 %    MPV 9.2 8.9 - 12.7 fL    Platelets 268 149 - 390 Thousands/uL    nRBC 0 /100 WBCs    Segmented % 53 43 - 75 %    Immature Grans % 0 0 - 2 %    Lymphocytes % 38 14 - 44 %    Monocytes % 7 4 - 12 %    Eosinophils Relative 2 0 - 6 %    Basophils Relative 0 0 - 1 %    Absolute Neutrophils 2.83 1.85 - 7.62 Thousands/µL    Absolute Immature Grans 0.01 0.00 - 0.20 Thousand/uL    Absolute Lymphocytes 2.06 0.60 - 4.47 Thousands/µL    Absolute Monocytes 0.40 0.17 - 1.22 Thousand/µL    Eosinophils Absolute 0.10 0.00 - 0.61 Thousand/µL    Basophils Absolute 0.02 0.00 - 0.10 Thousands/µL   TSH, 3rd generation with Free T4 reflex    Collection Time: 05/15/24  6:28 AM   Result Value Ref Range    TSH 3RD GENERATON 5.162 (H) 0.450 - 4.500 uIU/mL   Vitamin B12    Collection Time: 05/15/24  6:28 AM   Result Value Ref Range    Vitamin B-12 168 (L) 180 - 914 pg/mL   Folate    Collection Time: 05/15/24  6:28 AM   Result Value Ref Range    Folate >22.3 >5.9 ng/mL   Vitamin D 25 hydroxy    Collection Time: 05/15/24  6:28 AM   Result Value Ref Range    Vit D, 25-Hydroxy 11.6 (L) 30.0 - 100.0 ng/mL   Lipid panel    Collection Time: 05/15/24  6:28 AM   Result Value Ref Range    Cholesterol 191 See Comment mg/dL    Triglycerides 116 See Comment mg/dL    HDL, Direct 44 >=40 mg/dL    LDL Calculated 124 (H) 0 - 100 mg/dL    Non-HDL-Chol (CHOL-HDL) 147 mg/dl   Total Syphilis (IgG/IgM) Screening    Collection Time: 05/15/24  6:28 AM   Result Value Ref Range    Syphilis Total Antibody Non-reactive Non-Reactive   T4, free    Collection Time: 05/15/24  6:28 AM   Result Value Ref Range    Free T4 0.60 (L) 0.61 - 1.12 ng/dL   ECG 12 lead    Collection Time: 05/15/24 12:22 PM   Result Value Ref  Range    Ventricular Rate 66 BPM    Atrial Rate 66 BPM    OR Interval 146 ms    QRSD Interval 70 ms    QT Interval 402 ms    QTC Interval 421 ms    P [place] 39 degrees    QRS Axis 48 degrees    T Wave Axis 41 degrees        Esteban Candelaria

## 2024-05-16 NOTE — NURSING NOTE
Pt observed briefly in pt lounge watching tv and interacting with peers. Retired to bed early and found sleeping at HS med pass. Denies SI, HI and hallucinations. Refused melatonin, states no trouble with sleeping. Quickly returned to sleep. Safety checks continue.

## 2024-05-16 NOTE — PLAN OF CARE
Problem: Depression  Goal: Verbalize thoughts and feelings  Description: Interventions:  - Assess and re-assess patient's level of risk   - Engage patient in 1:1 interactions, daily, for a minimum of 15 minutes   - Encourage patient to express feelings, fears, frustrations, hopes   Outcome: Progressing  Goal: Refrain from harming self  Description: Interventions:  - Monitor patient closely, per order   - Supervise medication ingestion, monitor effects and side effects   Outcome: Progressing  Goal: Refrain from isolation  Description: Interventions:  - Develop a trusting relationship   - Encourage socialization   Outcome: Progressing

## 2024-05-16 NOTE — DISCHARGE INSTR - OTHER ORDERS
CRISIS INFORMATION  [facility]  Community Carelinks Program  [phone]  [facility]  Outpatient mental health, sexual abuse treatment, and suicide prevention treatment  [phone]  [facility]  Family Crisis Intervention Unit  Crisis Hotline [phone]    DRUG AND ALCOHOL RESOURCES  Community Prevention Resources of [facility]*  Education/Prevention and Recovery Support  [phone]  [facility]*  Outpatient, Intensive Outpatient Services, and [facility] Based Youth Services  [phone]  [facility]*  Outpatient, Intensive Outpatient Services, and Recovery House ([place])  [phone]  [facility]*  Withdrawal Management, Short-term Residential, Short-term with Co-occurring Disorders  [phone]  Parent to Parent*  Recovery Support Services  [phone]  [facility]*  Medication Assisted Treatment  [phone]    From the [facility] website [place]guides[place]opioid-epidemic/#GetNaloxone    How do I get naloxone?  Family members and friends can access naloxone by:    Obtaining a prescription from their family doctor  Using the standing order issued by [facility] Physician General Denia Cates. A standing order is a prescription written for the general public, rather than specifically for an individual.  To use the standing order, print it and take it with you to the pharmacy or have the digital version on your phone. Download the standing order from the Department of Health (PDF).    If you are unable to print it or use the digital version, the standing order is kept on file at many pharmacies. If a pharmacy does not have it on file, they may have the ability to look it up.    Naloxone prescriptions can be filled at most pharmacies. Although the medication might not be available for same-day pickup, it often can be ordered and available within a day or two.

## 2024-05-16 NOTE — PROGRESS NOTES
05/16/24 1535   Team Meeting   Meeting Type Tx Team Meeting   Initial Conference Date 05/16/24   Team Members Present   Team Members Present Physician;Nurse;   Physician Team Member Amarilys   Nursing Team Member Georges   Care Management Team Member Suhas   Patient/Family Present   Patient Present Yes   Patient's Family Present No     Tx plan was reviewed and discussed with Pt. Pt was encouraged to attend groups. Medication was discussed with Pt. Pt signed tx plan.

## 2024-05-16 NOTE — DISCHARGE INSTR - APPOINTMENTS
Katherine, or Linda, our Behavioral Health Nurse Navigators, will be calling you after your discharge, on the phone number that you provided.  They will be available as an additional support, if needed.   If you wish to speak with one of them, you may contact Katherine at [phone] or Linda at [phone].

## 2024-05-17 PROCEDURE — 99232 SBSQ HOSP IP/OBS MODERATE 35: CPT | Performed by: STUDENT IN AN ORGANIZED HEALTH CARE EDUCATION/TRAINING PROGRAM

## 2024-05-17 RX ORDER — CYANOCOBALAMIN 1000 UG/ML
1000 INJECTION, SOLUTION INTRAMUSCULAR; SUBCUTANEOUS ONCE
Status: DISCONTINUED | OUTPATIENT
Start: 2024-05-18 | End: 2024-07-04

## 2024-05-17 RX ADMIN — SERTRALINE HYDROCHLORIDE 25 MG: 25 TABLET ORAL at 08:11

## 2024-05-17 NOTE — PLAN OF CARE
Problem: Depression  Goal: Refrain from isolation  Description: Interventions:  - Develop a trusting relationship   - Encourage socialization   Outcome: Progressing  Goal: Complete daily ADLs, including personal hygiene independently, as able  Description: Interventions:  - Observe, teach, and assist patient with ADLS  -  Monitor and promote a balance of rest/activity, with adequate nutrition and elimination   Outcome: Progressing

## 2024-05-17 NOTE — PROGRESS NOTES
05/16/24 1333   Team Meeting   Meeting Type Tx Team Meeting   Initial Conference Date 05/16/24   Team Members Present   Team Members Present Physician;Nurse;   Physician Team Member Amarilys   Nursing Team Member Georges   Care Management Team Member Suhas   Patient/Family Present   Patient Present Yes   Patient's Family Present No     Tx plan was reviewed and discussed with Pt. Pt was encouraged to attend groups. Medication was discussed with Pt. Pt signed tx plan.

## 2024-05-17 NOTE — NURSING NOTE
PT refused levothyroxine despite education. Pt encouraged to follow up with PCP about thyroid levels.

## 2024-05-17 NOTE — NURSING NOTE
Patient has been visible on the unit and med/meal compliant.  Patient refused vitamins and states the only medication he will take is Zoloft.  He is cooperative and calm.

## 2024-05-17 NOTE — PROGRESS NOTES
05/17/24 0913   Team Meeting   Meeting Type Daily Rounds   Team Members Present   Team Members Present Physician;Nurse;   Physician Team Member Amarilys   Nursing Team Member Erick   Care Management Team Member Suhas   Patient/Family Present   Patient Present No   Patient's Family Present No     Pt med/meal compliant with the exception of his vitamins. Visible on the unit. Attending groups. Discharge to be determined.

## 2024-05-17 NOTE — PROGRESS NOTES
"Progress Note - Behavioral Health   Franco Roberson 39 y.o. male MRN: [id number]  Unit/Bed#: [facility] 352-02 Encounter: [id number]    Subjective:   Per nursing report, patient has been calm and cooperative on the unit.  He has been compliant with Zoloft but did not want to take any vitamins.  He has been without any acute behavioral issues. Patient is guarded and constricted, though brightens slightly on approach.  He reports having some upset stomach yesterday but that this had been self-limited.  He reports otherwise tolerating initiation of Zoloft well.  Patient has concerns about taking vitamins.  Discussed importance of vitamin supplementation.  Patient currently denies any SI, HI, or AVH.  He denies questions or concerns at this time.    Behavior over the last 24 hours:  unchanged  Sleep: normal  Appetite: normal  Medication side effects: Yes upset stomach, resolved  ROS: all other systems are negative    Mental Status Evaluation:  Appearance:  disheveled and marginal hygiene   Behavior:  calm, pleasant, cooperative, and guarded   Speech:  soft and scant   Mood:  \"Okay\"   Affect:  constricted   Thought Process:  linear and goal directed   Associations: concrete associations   Thought Content:  no overt delusions   Perceptual Disturbances: denies auditory or visual hallucinations when asked, does not appear responding to internal stimuli   Risk Potential: Suicidal ideation - None at present  Homicidal ideation - None  Potential for aggression - Not at present   Sensorium:  oriented to person, place, and time/date   Memory:  recent and remote memory grossly intact   Consciousness:  alert and awake   Attention/Concentration: attention span and concentration are age appropriate   Insight:  limited   Judgment: limited   Gait/Station: normal gait/station, normal balance   Motor Activity: no abnormal movements     Medications: all current active meds have been reviewed.  Current Facility-Administered Medications   Medication " Dose Route Frequency Provider Last Rate    acetaminophen  650 mg Oral Q6H PRN Basilio Brown MD      acetaminophen  650 mg Oral Q4H PRN Basilio Brown MD      acetaminophen  975 mg Oral Q6H PRN Basilio Brown MD      aluminum-magnesium hydroxide-simethicone  30 mL Oral Q4H PRN Basilio Brown MD      Artificial Tears  1 drop Both Eyes Q3H PRN Basilio Brown MD      atorvastatin  10 mg Oral Daily With Dinner WALLY Baptiste      benztropine  1 mg Intramuscular Q4H PRN Max 6/day Basilio Brown MD      benztropine  1 mg Oral Q4H PRN Max 6/day Basilio Brown MD      cyanocobalamin  1,000 mcg Oral Daily WALLY Baptiste      [START ON 5/18/2024] cyanocobalamin  1,000 mcg Intramuscular Once WALLY Baptiste      hydrOXYzine HCL  50 mg Oral Q6H PRN Max 4/day Basilio Brown MD      Or    diphenhydrAMINE  50 mg Intramuscular Q6H PRN Basilio Brown MD      ergocalciferol  50,000 Units Oral Weekly WALLY Baptiste      hydrOXYzine HCL  100 mg Oral Q6H PRN Max 4/day Basilio Brown MD      Or    LORazepam  2 mg Intramuscular Q6H PRN Basilio Brown MD      hydrOXYzine HCL  25 mg Oral Q6H PRN Max 4/day Basilio Brown MD      levothyroxine  25 mcg Oral Early Morning WALLY Baptiste      melatonin  3 mg Oral HS Basilio Brown MD      OLANZapine  5 mg Oral Q4H PRN Max 3/day Basilio Brown MD      Or    OLANZapine  2.5 mg Intramuscular Q4H PRN Max 3/day Basilio Brown MD      OLANZapine  5 mg Oral Q3H PRN Max 3/day Basilio Brown MD      Or    OLANZapine  5 mg Intramuscular Q3H PRN Max 3/day Basilio Brown MD      OLANZapine  2.5 mg Oral Q4H PRN Max 6/day Basilio Brown MD      polyethylene glycol  17 g Oral Daily PRN Basilio Brown MD      propranolol  10 mg Oral Q8H PRN Basilio Brown MD      senna-docusate sodium  1 tablet Oral Daily PRN Basilio Brown MD       sertraline  25 mg Oral Daily Basilio Panda MD      traZODone  50 mg Oral HS PRN Basilio Brown MD         Labs: I have personally reviewed all pertinent laboratory/tests results  Most Recent Labs:   Lab Results   Component Value Date    WBC 5.42 05/15/2024    RBC 5.05 05/15/2024    HGB 16.1 05/15/2024    HCT 49.4 (H) 05/15/2024     05/15/2024    RDW 12.6 05/15/2024    NEUTROABS 2.83 05/15/2024    SODIUM 142 05/15/2024    K 4.1 05/15/2024     05/15/2024    CO2 28 05/15/2024    BUN 13 05/15/2024    CREATININE 0.97 05/15/2024    GLUC 89 05/15/2024    CALCIUM 9.3 05/15/2024    AST 21 05/15/2024    ALT 19 05/15/2024    ALKPHOS 66 05/15/2024    TP 7.6 05/15/2024    ALB 4.3 05/15/2024    TBILI 0.69 05/15/2024    CHOLESTEROL 191 05/15/2024    HDL 44 05/15/2024    TRIG 116 05/15/2024    LDLCALC 124 (H) 05/15/2024    NONHDLC 147 05/15/2024    ZNE4YXHGWKHA 5.162 (H) 05/15/2024    FREET4 0.60 (L) 05/15/2024    SYPHILISAB Non-reactive 05/15/2024         Assessment & Plan   Principal Problem:    Unspecified depressive disorder (HCC)  Active Problems:    Medical clearance for psychiatric admission    Hyperlipidemia    Vitamin D deficiency    B12 deficiency    Hypothyroidism    Recommended Treatment:   Continue Zoloft 25 mg daily.  Continue all other medications at current doses as above.  Encourage group therapy, milieu therapy and occupational therapy  All current active medications have been reviewed  Encourage group therapy, milieu therapy and occupational therapy  Behavioral Health checks every 7 minutes    ----------------------------------------    Progress Toward Goals: progressing    Risks / Benefits of Treatment:    Risks, benefits, and possible side effects of medications explained to patient and patient verbalizes understanding and agreement for treatment.    Counseling / Coordination of Care:    Patient's progress discussed with staff in treatment team meeting.  Medications, treatment progress  and treatment plan reviewed with patient.    Basilio Panda MD 05/17/24

## 2024-05-17 NOTE — NURSING NOTE
Patient visible on unit, watching TV with peers. Patient behaviors controlled, offered no complaints upon approach. Patient cooperative with staff and with bedtime meds, will continue to monitor.

## 2024-05-18 PROBLEM — F84.0 AUTISM SPECTRUM DISORDER: Status: ACTIVE | Noted: 2024-05-18

## 2024-05-18 PROCEDURE — 99232 SBSQ HOSP IP/OBS MODERATE 35: CPT | Performed by: STUDENT IN AN ORGANIZED HEALTH CARE EDUCATION/TRAINING PROGRAM

## 2024-05-18 RX ADMIN — SERTRALINE HYDROCHLORIDE 25 MG: 25 TABLET ORAL at 09:38

## 2024-05-18 NOTE — NURSING NOTE
Patient has been in the dayroom watching TV. He is sitting in the milieu quite comfortably but he is not interacting with peers.  He refuses Melatonin and has said he is willing to take only Zoloft.  He denies S.I.H.I.A/H V/H

## 2024-05-18 NOTE — PLAN OF CARE
Problem: Ineffective Coping  Goal: Identifies ineffective coping skills  Outcome: Not Progressing     Problem: Depression  Goal: Verbalize thoughts and feelings  Description: Interventions:  - Assess and re-assess patient's level of risk   - Engage patient in 1:1 interactions, daily, for a minimum of 15 minutes   - Encourage patient to express feelings, fears, frustrations, hopes   Outcome: Not Progressing

## 2024-05-18 NOTE — NURSING NOTE
Patient has been visible on the unit watching TV.  He is not social and isolative to himself.  Patient admits since his mother  he has been isolating himself.  Patient seems to be having a difficult time processing his mother's death.  Patient was given information on ASD.  He will only take Zoloft and no other medication otherwise he is compliant and cooperative. He reports not sleeping well.

## 2024-05-19 PROCEDURE — 99232 SBSQ HOSP IP/OBS MODERATE 35: CPT | Performed by: STUDENT IN AN ORGANIZED HEALTH CARE EDUCATION/TRAINING PROGRAM

## 2024-05-19 RX ADMIN — SERTRALINE HYDROCHLORIDE 25 MG: 25 TABLET ORAL at 09:57

## 2024-05-19 NOTE — PROGRESS NOTES
"Progress Note - Behavioral Health   Franco Roberson 39 y.o. male MRN: [id number]  Unit/Bed#: [facility] 352-02 Encounter: [id number]    All documentation, nursing notes, labs, and vitals reviewed.  The patient's medication reconciliation chart was also analyzed for medication adherence.  I personally evaluated Franco Roberson and discussed current care with treatment team.    No acute events overnight. Franco did refuse Melatonin prior to bed, stating that it has a paradoxical effect. As such, this order has been discontinued. On examination, Franco is pleasant and cooperative. He spoke at length regarding our discussion yesterday about the strong possibility of him having ASD. He was given paperwork by RN staff which provided insight into the neurodevelopmental process. Franco read through this information and states today that such a diagnosis \"just makes sense\". He discusses his chronic struggles with sensory overload/stimulation and ways exposure to \"light and temperature changes results in severe muscle spasms and anxiety\". He states that having such a diagnosis would provide both \"relief\" and \"hope for the future\" as it would irish access to community based resources that could assist with housing, skill-building, etc. Franco is feeling more optimistic at the moment. He remains agreeable to formal neuropsychological testing and this order was placed yesterday, 5/18/24. At the moment, Franco reports adequate sleep and appetite. He remains mostly withdrawn to room. His anxiety and depression remain problematic but are mildly better compared to days prior to admission. His energy and motivation are low. He reports lingering feelings of apathy and chronic struggles with positive emotion and emotional reciprocity, likely rooted in ASD. On examination, Franco is anxious and nervous. He denies panic symptoms over the last 24 hours. During today's examination, Franco does not exhibit objective evidence of patrick/hypomania or " psychosis. Franco is not currently irritable, grandiose, labile, or pathologically euphoric. Franco is without perceptual disturbances, such as A/V hallucinations, paranoia, ideas of reference, or delusional beliefs. Franco offers no further concerns.     Mental Status Evaluation:    Appearance:  disheveled, marginal hygiene, looks older than stated age   Behavior:  pleasant, cooperative, limited eye contact   Speech:  slow, soft   Mood:  dysphoric, anxious   Affect:  blunted   Thought Process:  organized, logical, coherent   Associations: intact associations   Thought Content:  no overt delusions   Perceptual Disturbances: no auditory hallucinations, no visual hallucinations   Risk Potential: Suicidal ideation - None at present  Homicidal ideation - None at present  Potential for aggression - No   Sensorium:  oriented to person, place, and time/date   Memory:  recent and remote memory grossly intact   Consciousness:  alert and awake    Attention: attention span and concentration appear shorter than expected for age   Insight:  fair   Judgment: fair   Gait/Station: in bed   Motor Activity: no abnormal movements       Assessment:     Principal Problem:    Unspecified depressive disorder (HCC)  Active Problems:    Medical clearance for psychiatric admission    Hyperlipidemia    Vitamin D deficiency    B12 deficiency    Hypothyroidism    Rule Out Autism spectrum disorder      Plan/Recommended Treatment:     - Continue with pharmacotherapy, group therapy, milieu therapy and occupational therapy.    - Risks/benefits/alternatives to treatment discussed and Franco Roberson continues to verbalize understanding    - I did offer to further optimize Zoloft today, but Franco declined   - Neuropsych referral placed, awaiting completion of test for high suspicion of Autism Spectrum Disorder  - No psychopharmacologic changes necessary at this juncture, continue scheduled psychotropic agents at current doses (see below)   - Will  consider further optimization of psychotropic medication regimen as hospital course progresses   - Continue to assess for adverse medication side effects.  - Medical management as per SLIM recs  - Encourage Franco Roberson to participate in nonverbal forms of therapy including journaling and art/music therapy  - Continue precautionary Q7-minute safety checks.  - Continue to engage case management/SW to assist with collateral information, discharge planning, and the implementation of an individualized, patient-centered plan of care.  - The patient will be maintained on the following medications:    Current Facility-Administered Medications   Medication Dose Route Frequency Provider Last Rate    acetaminophen  650 mg Oral Q6H PRN Basilio Brown MD      acetaminophen  650 mg Oral Q4H PRN Basilio Brown MD      acetaminophen  975 mg Oral Q6H PRN Basilio Brown MD      aluminum-magnesium hydroxide-simethicone  30 mL Oral Q4H PRN Basilio Brown MD      Artificial Tears  1 drop Both Eyes Q3H PRN Basilio Brown MD      atorvastatin  10 mg Oral Daily With Dinner WALLY Baptiste      benztropine  1 mg Intramuscular Q4H PRN Max 6/day Basilio Brown MD      benztropine  1 mg Oral Q4H PRN Max 6/day Basilio Brown MD      cyanocobalamin  1,000 mcg Oral Daily WALLY Baptiste      cyanocobalamin  1,000 mcg Intramuscular Once WALLY Baptiste      hydrOXYzine HCL  50 mg Oral Q6H PRN Max 4/day Basilio Brown MD      Or    diphenhydrAMINE  50 mg Intramuscular Q6H PRN Basilio Brown MD      ergocalciferol  50,000 Units Oral Weekly WALLY Baptiste      hydrOXYzine HCL  100 mg Oral Q6H PRN Max 4/day Basilio Brown MD      Or    LORazepam  2 mg Intramuscular Q6H PRN Basilio Brown MD      hydrOXYzine HCL  25 mg Oral Q6H PRN Max 4/day Basilio Brown MD      levothyroxine  25 mcg Oral Early Morning WALLY Baptiste       OLANZapine  5 mg Oral Q4H PRN Max 3/day Basilio Brown MD      Or    OLANZapine  2.5 mg Intramuscular Q4H PRN Max 3/day Basilio Brown MD      OLANZapine  5 mg Oral Q3H PRN Max 3/day Basilio Brown MD      Or    OLANZapine  5 mg Intramuscular Q3H PRN Max 3/day Basilio Brown MD      OLANZapine  2.5 mg Oral Q4H PRN Max 6/day Basilio Brown MD      polyethylene glycol  17 g Oral Daily PRN Basilio Brown MD      propranolol  10 mg Oral Q8H PRN Basilio Brown MD      senna-docusate sodium  1 tablet Oral Daily PRN Basilio Brown MD      sertraline  25 mg Oral Daily Basilio Panda MD      traZODone  50 mg Oral HS PRN Basilio Brown MD

## 2024-05-19 NOTE — PLAN OF CARE
Problem: Ineffective Coping  Goal: Cooperates with admission process  Description: Interventions:   - Complete admission process  Outcome: Progressing  Goal: Identifies ineffective coping skills  Outcome: Progressing  Goal: Identifies healthy coping skills  Outcome: Progressing  Goal: Demonstrates healthy coping skills  Outcome: Progressing  Goal: Participates in unit activities  Description: Interventions:  - Provide therapeutic environment   - Provide required programming   - Redirect inappropriate behaviors   Outcome: Progressing

## 2024-05-19 NOTE — NURSING NOTE
"Patient remained visible on the unit throughout the evening. No peer socialization noted. Pleasant upon approach. Patient refused scheduled HS melatonin \"That actually keeps me up at night.\"    Patient retreated to his room for sleep without issue.   "

## 2024-05-19 NOTE — NURSING NOTE
Patient has been med/meal compliant, quiet, and withdrawn to self.  Patient reports not sleeping well because of his roommate.  He denies SI/HI and A/V hallucinations. Patient continues to complain of anxiety and depression.  Patients affect appears depressed, sad.

## 2024-05-20 PROCEDURE — 99232 SBSQ HOSP IP/OBS MODERATE 35: CPT | Performed by: STUDENT IN AN ORGANIZED HEALTH CARE EDUCATION/TRAINING PROGRAM

## 2024-05-20 RX ADMIN — SERTRALINE HYDROCHLORIDE 25 MG: 25 TABLET ORAL at 08:55

## 2024-05-20 NOTE — PROGRESS NOTES
05/20/24 0910   Team Meeting   Meeting Type Daily Rounds   Team Members Present   Team Members Present Physician;Nurse;   Physician Team Member Amarilys   Nursing Team Member Erick   Care Management Team Member Suhas   Patient/Family Present   Patient Present No   Patient's Family Present No     Pt with SI over the weekend, no plan. Compliant with Zoloft. Refusing melatonin. Visible on the unit, cooperative, delayed in speech. Discharge tbd.

## 2024-05-20 NOTE — CONSULTS
Consultation - Neuropsychology/Psychology Department  Franco Roberson 39 y.o. male MRN: [id number]  Unit/Bed#: [facility] 341-02 Encounter: [id number]        Reason for Consultation:  Franco Roberson is a 39 y.o. year old male who was referred for a Psychological Examination to assist with diagnosis and treatment planning.      History of Present Illness  Admitted for suicidal ideation with plan.  Physician Requesting Consult: Basilio Panda MD    PROBLEM LIST:  Patient Active Problem List   Diagnosis    Unspecified depressive disorder (HCC)    Medical clearance for psychiatric admission    Hyperlipidemia    Vitamin D deficiency    B12 deficiency    Hypothyroidism    Rule Out Autism spectrum disorder         Historical Information   Past Medical History:   Diagnosis Date    Depression     Psychiatric disorder     Suicide attempt (HCC)      No past surgical history on file.  Social History   Social History     Substance and Sexual Activity   Alcohol Use No     Social History     Substance and Sexual Activity   Drug Use No     Social History     Tobacco Use   Smoking Status Never   Smokeless Tobacco Not on file     Family History: History reviewed. No pertinent family history.    Meds/Allergies   current meds:   Current Facility-Administered Medications   Medication Dose Route Frequency    acetaminophen (TYLENOL) tablet 650 mg  650 mg Oral Q6H PRN    acetaminophen (TYLENOL) tablet 650 mg  650 mg Oral Q4H PRN    acetaminophen (TYLENOL) tablet 975 mg  975 mg Oral Q6H PRN    aluminum-magnesium hydroxide-simethicone (MAALOX) oral suspension 30 mL  30 mL Oral Q4H PRN    Artificial Tears ophthalmic solution 1 drop  1 drop Both Eyes Q3H PRN    atorvastatin (LIPITOR) tablet 10 mg  10 mg Oral Daily With Dinner    benztropine (COGENTIN) injection 1 mg  1 mg Intramuscular Q4H PRN Max 6/day    benztropine (COGENTIN) tablet 1 mg  1 mg Oral Q4H PRN Max 6/day    cyanocobalamin (VITAMIN B-12) tablet 1,000 mcg  1,000 mcg Oral Daily     cyanocobalamin injection 1,000 mcg  1,000 mcg Intramuscular Once    hydrOXYzine HCL (ATARAX) tablet 50 mg  50 mg Oral Q6H PRN Max 4/day    Or    diphenhydrAMINE (BENADRYL) injection 50 mg  50 mg Intramuscular Q6H PRN    ergocalciferol (VITAMIN D2) capsule 50,000 Units  50,000 Units Oral Weekly    hydrOXYzine HCL (ATARAX) tablet 100 mg  100 mg Oral Q6H PRN Max 4/day    Or    LORazepam (ATIVAN) injection 2 mg  2 mg Intramuscular Q6H PRN    hydrOXYzine HCL (ATARAX) tablet 25 mg  25 mg Oral Q6H PRN Max 4/day    levothyroxine tablet 25 mcg  25 mcg Oral Early Morning    OLANZapine (ZyPREXA) tablet 5 mg  5 mg Oral Q4H PRN Max 3/day    Or    OLANZapine (ZyPREXA) IM injection 2.5 mg  2.5 mg Intramuscular Q4H PRN Max 3/day    OLANZapine (ZyPREXA) tablet 5 mg  5 mg Oral Q3H PRN Max 3/day    Or    OLANZapine (ZyPREXA) IM injection 5 mg  5 mg Intramuscular Q3H PRN Max 3/day    OLANZapine (ZyPREXA) tablet 2.5 mg  2.5 mg Oral Q4H PRN Max 6/day    polyethylene glycol (MIRALAX) packet 17 g  17 g Oral Daily PRN    propranolol (INDERAL) tablet 10 mg  10 mg Oral Q8H PRN    senna-docusate sodium (SENOKOT S) 8.6-50 mg per tablet 1 tablet  1 tablet Oral Daily PRN    sertraline (ZOLOFT) tablet 25 mg  25 mg Oral Daily    traZODone (DESYREL) tablet 50 mg  50 mg Oral HS PRN       No Known Allergies      Family and Social Support:   No data recorded    Behavioral Observations/Testing: Patient was alert, oriented x 3 and cooperative; affect appeared constricted and  admitted to depressed mood and anxiety; no overt evidence of psychotic process; denied suicidal ideation/plan. Highly notable for poor eye contact and mild stuttering. Autism examination was performed using the ADOS - 2 System.    Patient reported he was evicted from home, living in the streets, and had not eaten for three days. He was reportedly sleeping in his car and believed recognized he had little social support, prompting him to seek psychiatric services from the  "[facility].Patient is presently unemployed after having two short term jobs in his career. He was reportedly fired by his first supervisor and quit his second job due to his mother's illness. He reported not having any plans to obtain a job at this point and stated, \"I wouldn't know how to go about obtaining a job\". \"I have no value for money\". He stated, \"I can't survive in society it is overly complicated\". He reported, \"I don't want work to be the only thing in my life\". He reportedly experiences significant anxiety leaving the home and has difficulty interacting with people. He stated, \"I don't know what to say to them\". He reported he is only comfortable staying at home and listening to music and playing games. He reported not knowing what type of work he would even enjoy doing.    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 However, he reportedly received a GED.    Patient discussed his social difficulties stating he is very shy, quiet and reserved and has always been unable to \"reach out to others\". He reported, \"I can't initiate conversation, I don't know what to say to them\". He is reportedly only comfortable when able keep to himself. Patient reported a history of being bullied and teased in school.     The things that elicit different emotions such as happiness, anxiety, and sadness tend to be non-social in nature. He also stated, \"I forgot how happiness feels\" and reported experiencing happiness \"maybe on two occasions\". He reports instances of experiencing fear such as \"when I have to go to places, because I'm around people\". He reports a long history of feeling distant from others, \"weird around them\" and has difficulty with normal conversation. He feels \"numb and distant\" when he becomes angry and depressed.     Patient reported he does not know what he will do upon discharge stating, \"My mother never taught me life " "skills\" and he does not understand what would be required of him in order to establish living arrangements. Patient has never lived away from his parents. He would prefer to live on his own than sharing a home with another individual. He reportedly stays inside his home but will on occasion go for walks in the town \"at 2 am when no one is around\". He reportedly has no friends at this time and has no plans or dreams for the future.     Patient does not appear to direct appropriate facial expressions to the examiner, shows little or not expressed pleasure during interaction with the examiner, minimal communication of his own affect, minimal identification/communication of understanding of emotion in others, limited insight into the nature of social relationships, and limited indication of a sense of responsibility.    Notably, the ASD could have occurred before the age of 21.    Autistic Spectrum Disorder        Summary/Impression:       "

## 2024-05-20 NOTE — NURSING NOTE
Pt is visible on the unit but mostly isolative to self. Watching tv, encouraged to attend groups. Pt has delayed speech with poor eye contact. Reports he had difficulty falling asleep but once he did he was able to sleep through the night. Denies SI/HI/AH/VH at this time. Refusing his scheduled B-12 but compliant with zoloft. Meal compliant. Denies any unmet needs or complaints at this time.

## 2024-05-20 NOTE — PROGRESS NOTES
"Progress Note - Behavioral Health   Franco Roberson 39 y.o. male MRN: [id number]  Unit/Bed#: [facility] 341-02 Encounter: [id number]    Subjective:   Per nursing report, patient has been calm and cooperative. No acute behavioral issues overnight. He has been compliant with Zoloft. Patient is doing fairly well today.  He remains constricted in affect but does brighten appropriately at times.  He has poor eye contact throughout the encounter.  He reports tolerating initial dose of Zoloft well.  Patient was evaluated by neuropsych earlier today.  Results of this are currently pending.  He denies any current SI, HI, or AVH.  He denies questions or concerns at this time.    Behavior over the last 24 hours:  unchanged  Sleep: normal  Appetite: normal  Medication side effects: No  ROS: no complaints and all other systems are negative    Mental Status Evaluation:  Appearance:  disheveled and marginal hygiene   Behavior:  calm, pleasant, cooperative, guarded, and bizarre   Speech:  soft and scant   Mood:  \"okay\"   Affect:  constricted, slightly brighter   Thought Process:  linear and goal directed   Associations: concrete associations   Thought Content:  no overt delusions   Perceptual Disturbances: denies auditory or visual hallucinations when asked, does not appear responding to internal stimuli   Risk Potential: Suicidal ideation - None  Homicidal ideation - None  Potential for aggression - Not at present   Sensorium:  oriented to person, place, and time/date   Memory:  recent and remote memory grossly intact   Consciousness:  alert and awake   Attention/Concentration: attention span and concentration are age appropriate   Insight:  limited   Judgment: limited   Gait/Station: normal gait/station, normal balance   Motor Activity: no abnormal movements     Medications: all current active meds have been reviewed.  Current Facility-Administered Medications   Medication Dose Route Frequency Provider Last Rate    acetaminophen  650 mg Oral " Q6H PRN Basilio Brown MD      acetaminophen  650 mg Oral Q4H PRN Basilio Brown MD      acetaminophen  975 mg Oral Q6H PRN Basilio Brown MD      aluminum-magnesium hydroxide-simethicone  30 mL Oral Q4H PRN Basilio Brown MD      Artificial Tears  1 drop Both Eyes Q3H PRN Basilio Brown MD      atorvastatin  10 mg Oral Daily With Dinner WALLY Baptiste      benztropine  1 mg Intramuscular Q4H PRN Max 6/day Basilio Brown MD      benztropine  1 mg Oral Q4H PRN Max 6/day Basilio Brown MD      cyanocobalamin  1,000 mcg Oral Daily WALLY Baptiste      cyanocobalamin  1,000 mcg Intramuscular Once WALLY Baptiste      hydrOXYzine HCL  50 mg Oral Q6H PRN Max 4/day Basilio Brown MD      Or    diphenhydrAMINE  50 mg Intramuscular Q6H PRN Basilio Brown MD      ergocalciferol  50,000 Units Oral Weekly WALLY Baptiste      hydrOXYzine HCL  100 mg Oral Q6H PRN Max 4/day Basilio Brown MD      Or    LORazepam  2 mg Intramuscular Q6H PRN Basilio Brown MD      hydrOXYzine HCL  25 mg Oral Q6H PRN Max 4/day Basilio Brown MD      levothyroxine  25 mcg Oral Early Morning WALLY Baptiste      OLANZapine  5 mg Oral Q4H PRN Max 3/day Basilio Brown MD      Or    OLANZapine  2.5 mg Intramuscular Q4H PRN Max 3/day Basilio Brown MD      OLANZapine  5 mg Oral Q3H PRN Max 3/day Basilio Brown MD      Or    OLANZapine  5 mg Intramuscular Q3H PRN Max 3/day Basilio Brown MD      OLANZapine  2.5 mg Oral Q4H PRN Max 6/day Basilio Brown MD      polyethylene glycol  17 g Oral Daily PRN Basilio Brown MD      propranolol  10 mg Oral Q8H PRN Basilio Brown MD      senna-docusate sodium  1 tablet Oral Daily PRN Basilio Brown MD      sertraline  25 mg Oral Daily Basilio Panda MD      traZODone  50 mg Oral HS PRN Basilio Brown MD         Labs: I have personally reviewed  all pertinent laboratory/tests results  Most Recent Labs:   Lab Results   Component Value Date    WBC 5.42 05/15/2024    RBC 5.05 05/15/2024    HGB 16.1 05/15/2024    HCT 49.4 (H) 05/15/2024     05/15/2024    RDW 12.6 05/15/2024    NEUTROABS 2.83 05/15/2024    SODIUM 142 05/15/2024    K 4.1 05/15/2024     05/15/2024    CO2 28 05/15/2024    BUN 13 05/15/2024    CREATININE 0.97 05/15/2024    GLUC 89 05/15/2024    CALCIUM 9.3 05/15/2024    AST 21 05/15/2024    ALT 19 05/15/2024    ALKPHOS 66 05/15/2024    TP 7.6 05/15/2024    ALB 4.3 05/15/2024    TBILI 0.69 05/15/2024    CHOLESTEROL 191 05/15/2024    HDL 44 05/15/2024    TRIG 116 05/15/2024    LDLCALC 124 (H) 05/15/2024    NONHDLC 147 05/15/2024    [id number] 5.162 (H) 05/15/2024    FREET4 0.60 (L) 05/15/2024    SYPHILISAB Non-reactive 05/15/2024         Assessment & Plan   Principal Problem:    Unspecified depressive disorder (HCC)  Active Problems:    Medical clearance for psychiatric admission    Hyperlipidemia    Vitamin D deficiency    B12 deficiency    Hypothyroidism    Rule Out Autism spectrum disorder    Recommended Treatment:   Continue Zoloft 25 mg daily.  Neuropsych evaluation results pending.  Continue all other medications at current doses as above.  Encourage group therapy, milieu therapy and occupational therapy  All current active medications have been reviewed  Encourage group therapy, milieu therapy and occupational therapy  Behavioral Health checks every 7 minutes    ----------------------------------------    Progress Toward Goals: progressing    Risks / Benefits of Treatment:    Risks, benefits, and possible side effects of medications explained to patient and patient verbalizes understanding and agreement for treatment.    Counseling / Coordination of Care:    Patient's progress discussed with staff in treatment team meeting.  Medications, treatment progress and treatment plan reviewed with patient.    Basilio Panda MD  05/20/24

## 2024-05-20 NOTE — PLAN OF CARE
Problem: Ineffective Coping  Goal: Cooperates with admission process  Description: Interventions:   - Complete admission process  Outcome: Progressing  Goal: Identifies ineffective coping skills  Outcome: Progressing  Goal: Identifies healthy coping skills  Outcome: Progressing  Goal: Demonstrates healthy coping skills  Outcome: Progressing  Goal: Participates in unit activities  Description: Interventions:  - Provide therapeutic environment   - Provide required programming   - Redirect inappropriate behaviors   Outcome: Progressing     Problem: Risk for Self Injury/Neglect  Goal: Treatment Goal: Remain safe during length of stay, learn and adopt new coping skills, and be free of self-injurious ideation, impulses and acts at the time of discharge  Outcome: Progressing

## 2024-05-20 NOTE — NURSING NOTE
Patient visible at start of shift. Cooperative with routine. Denied any unmet needs. Patient requested a room change due to noise. Room changed without issue.

## 2024-05-20 NOTE — PLAN OF CARE
Pt starting to attend more groups. Pt typically quiet but does appear to be getting more comfortable in participating.

## 2024-05-21 PROCEDURE — 99232 SBSQ HOSP IP/OBS MODERATE 35: CPT | Performed by: STUDENT IN AN ORGANIZED HEALTH CARE EDUCATION/TRAINING PROGRAM

## 2024-05-21 RX ADMIN — SERTRALINE HYDROCHLORIDE 25 MG: 25 TABLET ORAL at 08:35

## 2024-05-21 NOTE — PROGRESS NOTES
"Progress Note - Behavioral Health   Franco Roberson 39 y.o. male MRN: [id number]  [facility] Encounter: [id number]    Subjective:   Per nursing report, patient has been doing well.  He has been pleasant and cooperative.  He remains compliant with his Zoloft.  He has been without any acute behavioral issues.  Neuropsych assessment done yesterday, results are consistent with autism spectrum disorder.  Patient is doing fairly well today.  He is constricted in affect but brightens appropriately during interview.  He reports that his mood is improving.  He denies any current SI, HI, or AVH.  He reports tolerating Zoloft well without any adverse effects noticed.  He is agreeable to further titration of this.  Patient reports that he does not like taking melatonin at night as he finds it has the opposite effect on him.  Discussed plan to utilize trazodone as needed for sleep and patient was agreeable to this.  He denies any questions or concerns.    Behavior over the last 24 hours:  unchanged  Sleep: poor  Appetite: normal  Medication side effects: No  ROS: no complaints and all other systems are negative    Mental Status Evaluation:  Appearance:  dressed appropriately   Behavior:  calm, pleasant, and cooperative   Speech:  soft and scant   Mood:  \"good\"   Affect:  constricted, mood-incongruent   Thought Process:  linear and goal directed   Associations: concrete associations   Thought Content:  no overt delusions   Perceptual Disturbances: denies auditory or visual hallucinations when asked, does not appear responding to internal stimuli   Risk Potential: Suicidal ideation - None  Homicidal ideation - None  Potential for aggression - Not at present   Sensorium:  oriented to person, place, and time/date   Memory:  recent and remote memory grossly intact   Consciousness:  alert and awake   Attention/Concentration: attention span and concentration are age appropriate   Insight:  limited   Judgment: limited "   Gait/Station: normal gait/station, normal balance   Motor Activity: no abnormal movements     Medications: all current active meds have been reviewed.  Current Facility-Administered Medications   Medication Dose Route Frequency Provider Last Rate    acetaminophen  650 mg Oral Q6H PRN Basilio Brown MD      acetaminophen  650 mg Oral Q4H PRN Basilio Brown MD      acetaminophen  975 mg Oral Q6H PRN Basilio Brown MD      aluminum-magnesium hydroxide-simethicone  30 mL Oral Q4H PRN Basilio Brown MD      Artificial Tears  1 drop Both Eyes Q3H PRN Basilio Brown MD      atorvastatin  10 mg Oral Daily With Dinner WALLY Baptiste      benztropine  1 mg Intramuscular Q4H PRN Max 6/day Basilio Brown MD      benztropine  1 mg Oral Q4H PRN Max 6/day Basilio Brown MD      cyanocobalamin  1,000 mcg Oral Daily WALLY Baptiste      cyanocobalamin  1,000 mcg Intramuscular Once WALLY Baptiste      hydrOXYzine HCL  50 mg Oral Q6H PRN Max 4/day Basilio Brown MD      Or    diphenhydrAMINE  50 mg Intramuscular Q6H PRN Basilio Brown MD      ergocalciferol  50,000 Units Oral Weekly WALLY Baptiste      hydrOXYzine HCL  100 mg Oral Q6H PRN Max 4/day Basilio Brown MD      Or    LORazepam  2 mg Intramuscular Q6H PRN Basilio Brown MD      hydrOXYzine HCL  25 mg Oral Q6H PRN Max 4/day Basilio Brown MD      levothyroxine  25 mcg Oral Early Morning WALLY Baptiste      OLANZapine  5 mg Oral Q4H PRN Max 3/day Basilio Brown MD      Or    OLANZapine  2.5 mg Intramuscular Q4H PRN Max 3/day Basilio Brown MD      OLANZapine  5 mg Oral Q3H PRN Max 3/day Basilio Brown MD      Or    OLANZapine  5 mg Intramuscular Q3H PRN Max 3/day Basilio Brown MD      OLANZapine  2.5 mg Oral Q4H PRN Max 6/day Basilio Brown MD      polyethylene glycol  17 g Oral Daily PRN Basilio Brown MD       propranolol  10 mg Oral Q8H PRN Basilio Brown MD      senna-docusate sodium  1 tablet Oral Daily PRN Basilio Brown MD      sertraline  25 mg Oral Daily Basilio Panda MD      traZODone  50 mg Oral HS PRN Basilio Brown MD         Labs: I have personally reviewed all pertinent laboratory/tests results  Most Recent Labs:   Lab Results   Component Value Date    WBC 5.42 05/15/2024    RBC 5.05 05/15/2024    HGB 16.1 05/15/2024    HCT 49.4 (H) 05/15/2024     05/15/2024    RDW 12.6 05/15/2024    NEUTROABS 2.83 05/15/2024    SODIUM 142 05/15/2024    K 4.1 05/15/2024     05/15/2024    CO2 28 05/15/2024    BUN 13 05/15/2024    CREATININE 0.97 05/15/2024    GLUC 89 05/15/2024    CALCIUM 9.3 05/15/2024    AST 21 05/15/2024    ALT 19 05/15/2024    ALKPHOS 66 05/15/2024    TP 7.6 05/15/2024    ALB 4.3 05/15/2024    TBILI 0.69 05/15/2024    CHOLESTEROL 191 05/15/2024    HDL 44 05/15/2024    TRIG 116 05/15/2024    LDLCALC 124 (H) 05/15/2024    NONHDLC 147 05/15/2024    [id number] 5.162 (H) 05/15/2024    FREET4 0.60 (L) 05/15/2024    SYPHILISAB Non-reactive 05/15/2024         Assessment & Plan   Principal Problem:    Unspecified depressive disorder (HCC)  Active Problems:    Medical clearance for psychiatric admission    Hyperlipidemia    Vitamin D deficiency    B12 deficiency    Hypothyroidism    Rule Out Autism spectrum disorder    Recommended Treatment:   Increase Zoloft to 50 mg daily.  Continue all other medications at current doses as above.  Encourage group therapy, milieu therapy and occupational therapy  All current active medications have been reviewed  Encourage group therapy, milieu therapy and occupational therapy  Behavioral Health checks every 7 minutes    ----------------------------------------    Progress Toward Goals: progressing    Risks / Benefits of Treatment:    Risks, benefits, and possible side effects of medications explained to patient and patient verbalizes  understanding and agreement for treatment.    Counseling / Coordination of Care:    Patient's progress discussed with staff in treatment team meeting.  Medications, treatment progress and treatment plan reviewed with patient.    Basilio Panda MD 05/21/24

## 2024-05-21 NOTE — NURSING NOTE
"Patient visible intermittently throughout the evening. Pleasant upon approach. Calm and cooperative. Patient stated \"I only take Zoloft, if you can tell everyone. I feel bad when they come to me and I say no to the medication. Patient retreated to his room for sleep without issue.   "

## 2024-05-21 NOTE — PROGRESS NOTES
05/21/24 0921   Team Meeting   Meeting Type Daily Rounds   Team Members Present   Team Members Present Physician;;Nurse   Physician Team Member Amarilys   Nursing Team Member [facility] Team Member Noa   Patient/Family Present   Patient Present No   Patient's Family Present No     Pt was observed in his room. Pt is calm, cooperative and pleasant. Pt is medication and meal compliant. Pt's discharge is to be determined.    Medical Necessity Information: It is in your best interest to select a reason for this procedure from the list below. All of these items fulfill various CMS LCD requirements except lesion extends to a margin. Include Z78.9 (Other Specified Conditions Influencing Health Status) As An Associated Diagnosis?: No Medical Necessity Clause: This procedure was medically necessary because the lesion that was treated was: Lab: [id number] Lab Facility:  Size Of Lesion In Cm: 0.5 X Size Of Lesion In Cm (Optional): 0 Size Of Margin In Cm: 0.1 Excision Method: Elliptical Repair Type: Intermediate Suturegard Retention Suture: 2-0 Nylon Retention Suture Bite Size: 3 mm Length To Time In Minutes Device Was In Place: 10 Number Of Hemigard Strips Per Side: 1 Intermediate / Complex Repair - Final Wound Length In Cm: 2.4 Undermining Type: Entire Wound Debridement Text: The wound edges were debrided prior to proceeding with the closure to facilitate wound healing. Helical Rim Text: The closure involved the helical rim. Vermilion Border Text: The closure involved the vermilion border. Nostril Rim Text: The closure involved the nostril rim. Retention Suture Text: Retention sutures were placed to support the closure and prevent dehiscence. Suture Removal: 10 days Epidermal Closure Graft Donor Site (Optional): simple interrupted Graft Donor Site Bandage (Optional-Leave Blank If You Don't Want In Note): Steri-strips and a pressure bandage were applied to the donor site. Detail Level: Detailed Excision Depth: adipose tissue Scalpel Size: 15C blade Anesthesia Type: 1% lidocaine with epinephrine Additional Anesthesia Volume In Cc: 6 Hemostasis: Electrocautery Estimated Blood Loss (Cc): minimal Deep Sutures: 5-0 Vicryl Epidermal Sutures: 4-0 Ethilon Wound Care: Petrolatum Dressing: dry sterile dressing Suturegard Intro: Intraoperative tissue expansion was performed, utilizing the SUTUREGARD device, in order to reduce wound tension. Suturegard Body: The suture ends were repeatedly re-tightened and re-clamped to achieve the desired tissue expansion. Hemigard Intro: Due to skin fragility and wound tension, it was decided to use HEMIGARD adhesive retention suture devices to permit a linear closure. The skin was cleaned and dried for a 6cm distance away from the wound. Excessive hair, if present, was removed to allow for adhesion. Hemigard Postcare Instructions: The HEMIGARD strips are to remain completely dry for at least 5-7 days. Complex Repair Preamble Text (Leave Blank If You Do Not Want): Extensive wide undermining was performed. Intermediate Repair Preamble Text (Leave Blank If You Do Not Want): Undermining was performed with blunt dissection. Fusiform Excision Additional Text (Leave Blank If You Do Not Want): The margin was drawn around the clinically apparent lesion.  A fusiform shape was then drawn on the skin incorporating the lesion and margins.  Incisions were then made along these lines to the appropriate tissue plane and the lesion was extirpated. Eliptical Excision Additional Text (Leave Blank If You Do Not Want): The margin was drawn around the clinically apparent lesion.  An elliptical shape was then drawn on the skin incorporating the lesion and margins.  Incisions were then made along these lines to the appropriate tissue plane and the lesion was extirpated. Saucerization Excision Additional Text (Leave Blank If You Do Not Want): The margin was drawn around the clinically apparent lesion.  Incisions were then made along these lines, in a tangential fashion, to the appropriate tissue plane and the lesion was extirpated. Slit Excision Additional Text (Leave Blank If You Do Not Want): A linear line was drawn on the skin overlying the lesion. An incision was made slowly until the lesion was visualized.  Once visualized, the lesion was removed with blunt dissection. Excisional Biopsy Additional Text (Leave Blank If You Do Not Want): The margin was drawn around the clinically apparent lesion. An elliptical shape was then drawn on the skin incorporating the lesion and margins.  Incisions were then made along these lines to the appropriate tissue plane and the lesion was extirpated. Perilesional Excision Additional Text (Leave Blank If You Do Not Want): The margin was drawn around the clinically apparent lesion. Incisions were then made along these lines to the appropriate tissue plane and the lesion was extirpated. Repair Performed By Another Provider Text (Leave Blank If You Do Not Want): After the tissue was excised the defect was repaired by another provider. No Repair - Repaired With Adjacent Surgical Defect Text (Leave Blank If You Do Not Want): After the excision the defect was repaired concurrently with another surgical defect which was in close approximation. Advancement Flap (Single) Text: The defect edges were debeveled with a #15 scalpel blade.  Given the location of the defect and the proximity to free margins a single advancement flap was deemed most appropriate.  Using a sterile surgical marker, an appropriate advancement flap was drawn incorporating the defect and placing the expected incisions within the relaxed skin tension lines where possible.    The area thus outlined was incised deep to adipose tissue with a #15 scalpel blade.  The skin margins were undermined to an appropriate distance in all directions utilizing iris scissors. Advancement Flap (Double) Text: The defect edges were debeveled with a #15 scalpel blade.  Given the location of the defect and the proximity to free margins a double advancement flap was deemed most appropriate.  Using a sterile surgical marker, the appropriate advancement flaps were drawn incorporating the defect and placing the expected incisions within the relaxed skin tension lines where possible.    The area thus outlined was incised deep to adipose tissue with a #15 scalpel blade.  The skin margins were undermined to an appropriate distance in all directions utilizing iris scissors. Burow's Advancement Flap Text: The defect edges were debeveled with a #15 scalpel blade.  Given the location of the defect and the proximity to free margins a Burow's advancement flap was deemed most appropriate.  Using a sterile surgical marker, the appropriate advancement flap was drawn incorporating the defect and placing the expected incisions within the relaxed skin tension lines where possible.    The area thus outlined was incised deep to adipose tissue with a #15 scalpel blade.  The skin margins were undermined to an appropriate distance in all directions utilizing iris scissors. Chonodrocutaneous Helical Advancement Flap Text: The defect edges were debeveled with a #15 scalpel blade.  Given the location of the defect and the proximity to free margins a chondrocutaneous helical advancement flap was deemed most appropriate.  Using a sterile surgical marker, the appropriate advancement flap was drawn incorporating the defect and placing the expected incisions within the relaxed skin tension lines where possible.    The area thus outlined was incised deep to adipose tissue with a #15 scalpel blade.  The skin margins were undermined to an appropriate distance in all directions utilizing iris scissors. Crescentic Advancement Flap Text: The defect edges were debeveled with a #15 scalpel blade.  Given the location of the defect and the proximity to free margins a crescentic advancement flap was deemed most appropriate.  Using a sterile surgical marker, the appropriate advancement flap was drawn incorporating the defect and placing the expected incisions within the relaxed skin tension lines where possible.    The area thus outlined was incised deep to adipose tissue with a #15 scalpel blade.  The skin margins were undermined to an appropriate distance in all directions utilizing iris scissors. A-T Advancement Flap Text: The defect edges were debeveled with a #15 scalpel blade.  Given the location of the defect, shape of the defect and the proximity to free margins an A-T advancement flap was deemed most appropriate.  Using a sterile surgical marker, an appropriate advancement flap was drawn incorporating the defect and placing the expected incisions within the relaxed skin tension lines where possible.    The area thus outlined was incised deep to adipose tissue with a #15 scalpel blade.  The skin margins were undermined to an appropriate distance in all directions utilizing iris scissors. O-T Advancement Flap Text: The defect edges were debeveled with a #15 scalpel blade.  Given the location of the defect, shape of the defect and the proximity to free margins an O-T advancement flap was deemed most appropriate.  Using a sterile surgical marker, an appropriate advancement flap was drawn incorporating the defect and placing the expected incisions within the relaxed skin tension lines where possible.    The area thus outlined was incised deep to adipose tissue with a #15 scalpel blade.  The skin margins were undermined to an appropriate distance in all directions utilizing iris scissors. O-L Flap Text: The defect edges were debeveled with a #15 scalpel blade.  Given the location of the defect, shape of the defect and the proximity to free margins an O-L flap was deemed most appropriate.  Using a sterile surgical marker, an appropriate advancement flap was drawn incorporating the defect and placing the expected incisions within the relaxed skin tension lines where possible.    The area thus outlined was incised deep to adipose tissue with a #15 scalpel blade.  The skin margins were undermined to an appropriate distance in all directions utilizing iris scissors. O-Z Flap Text: The defect edges were debeveled with a #15 scalpel blade.  Given the location of the defect, shape of the defect and the proximity to free margins an O-Z flap was deemed most appropriate.  Using a sterile surgical marker, an appropriate transposition flap was drawn incorporating the defect and placing the expected incisions within the relaxed skin tension lines where possible. The area thus outlined was incised deep to adipose tissue with a #15 scalpel blade.  The skin margins were undermined to an appropriate distance in all directions utilizing iris scissors. Double O-Z Flap Text: The defect edges were debeveled with a #15 scalpel blade.  Given the location of the defect, shape of the defect and the proximity to free margins a Double O-Z flap was deemed most appropriate.  Using a sterile surgical marker, an appropriate transposition flap was drawn incorporating the defect and placing the expected incisions within the relaxed skin tension lines where possible. The area thus outlined was incised deep to adipose tissue with a #15 scalpel blade.  The skin margins were undermined to an appropriate distance in all directions utilizing iris scissors. V-Y Flap Text: The defect edges were debeveled with a #15 scalpel blade.  Given the location of the defect, shape of the defect and the proximity to free margins a V-Y flap was deemed most appropriate.  Using a sterile surgical marker, an appropriate advancement flap was drawn incorporating the defect and placing the expected incisions within the relaxed skin tension lines where possible.    The area thus outlined was incised deep to adipose tissue with a #15 scalpel blade.  The skin margins were undermined to an appropriate distance in all directions utilizing iris scissors. Mercedes Flap Text: The defect edges were debeveled with a #15 scalpel blade.  Given the location of the defect, shape of the defect and the proximity to free margins a Mercedes flap was deemed most appropriate.  Using a sterile surgical marker, an appropriate advancement flap was drawn incorporating the defect and placing the expected incisions within the relaxed skin tension lines where possible. The area thus outlined was incised deep to adipose tissue with a #15 scalpel blade.  The skin margins were undermined to an appropriate distance in all directions utilizing iris scissors. Modified Advancement Flap Text: The defect edges were debeveled with a #15 scalpel blade.  Given the location of the defect, shape of the defect and the proximity to free margins a modified advancement flap was deemed most appropriate.  Using a sterile surgical marker, an appropriate advancement flap was drawn incorporating the defect and placing the expected incisions within the relaxed skin tension lines where possible.    The area thus outlined was incised deep to adipose tissue with a #15 scalpel blade.  The skin margins were undermined to an appropriate distance in all directions utilizing iris scissors. Mucosal Advancement Flap Text: Given the location of the defect, shape of the defect and the proximity to free margins a mucosal advancement flap was deemed most appropriate. Incisions were made with a 15 blade scalpel in the appropriate fashion along the cutaneous vermillion border and the mucosal lip. The remaining actinically damaged mucosal tissue was excised.  The mucosal advancement flap was then elevated to the gingival sulcus with care taken to preserve the neurovascular structures and advanced into the primary defect. Care was taken to ensure that precise realignment of the vermilion border was achieved. Hatchet Flap Text: The defect edges were debeveled with a #15 scalpel blade.  Given the location of the defect, shape of the defect and the proximity to free margins a hatchet flap was deemed most appropriate.  Using a sterile surgical marker, an appropriate hatchet flap was drawn incorporating the defect and placing the expected incisions within the relaxed skin tension lines where possible.    The area thus outlined was incised deep to adipose tissue with a #15 scalpel blade.  The skin margins were undermined to an appropriate distance in all directions utilizing iris scissors. Rotation Flap Text: The defect edges were debeveled with a #15 scalpel blade.  Given the location of the defect, shape of the defect and the proximity to free margins a rotation flap was deemed most appropriate.  Using a sterile surgical marker, an appropriate rotation flap was drawn incorporating the defect and placing the expected incisions within the relaxed skin tension lines where possible.    The area thus outlined was incised deep to adipose tissue with a #15 scalpel blade.  The skin margins were undermined to an appropriate distance in all directions utilizing iris scissors. Spiral Flap Text: The defect edges were debeveled with a #15 scalpel blade.  Given the location of the defect, shape of the defect and the proximity to free margins a spiral flap was deemed most appropriate.  Using a sterile surgical marker, an appropriate rotation flap was drawn incorporating the defect and placing the expected incisions within the relaxed skin tension lines where possible. The area thus outlined was incised deep to adipose tissue with a #15 scalpel blade.  The skin margins were undermined to an appropriate distance in all directions utilizing iris scissors. Star Wedge Flap Text: The defect edges were debeveled with a #15 scalpel blade.  Given the location of the defect, shape of the defect and the proximity to free margins a star wedge flap was deemed most appropriate.  Using a sterile surgical marker, an appropriate rotation flap was drawn incorporating the defect and placing the expected incisions within the relaxed skin tension lines where possible. The area thus outlined was incised deep to adipose tissue with a #15 scalpel blade.  The skin margins were undermined to an appropriate distance in all directions utilizing iris scissors. Transposition Flap Text: The defect edges were debeveled with a #15 scalpel blade.  Given the location of the defect and the proximity to free margins a transposition flap was deemed most appropriate.  Using a sterile surgical marker, an appropriate transposition flap was drawn incorporating the defect.    The area thus outlined was incised deep to adipose tissue with a #15 scalpel blade.  The skin margins were undermined to an appropriate distance in all directions utilizing iris scissors. Muscle Hinge Flap Text: The defect edges were debeveled with a #15 scalpel blade.  Given the size, depth and location of the defect and the proximity to free margins a muscle hinge flap was deemed most appropriate.  Using a sterile surgical marker, an appropriate hinge flap was drawn incorporating the defect. The area thus outlined was incised with a #15 scalpel blade.  The skin margins were undermined to an appropriate distance in all directions utilizing iris scissors. Nasal Turnover Hinge Flap Text: The defect edges were debeveled with a #15 scalpel blade.  Given the size, depth, location of the defect and the defect being full thickness a nasal turnover hinge flap was deemed most appropriate.  Using a sterile surgical marker, an appropriate hinge flap was drawn incorporating the defect. The area thus outlined was incised with a #15 scalpel blade. The flap was designed to recreate the nasal mucosal lining and the alar rim. The skin margins were undermined to an appropriate distance in all directions utilizing iris scissors. Nasalis-Muscle-Based Myocutaneous Island Pedicle Flap Text: Using a #15 blade, an incision was made around the donor flap to the level of the nasalis muscle. Wide lateral undermining was then performed in both the subcutaneous plane above the nasalis muscle, and in a submuscular plane just above periosteum. This allowed the formation of a free nasalis muscle axial pedicle (based on the angular artery) which was still attached to the actual cutaneous flap, increasing its mobility and vascular viability. Hemostasis was obtained with pinpoint electrocoagulation. The flap was mobilized into position and the pivotal anchor points positioned and stabilized with buried interrupted sutures. Subcutaneous and dermal tissues were closed in a multilayered fashion with sutures. Tissue redundancies were excised, and the epidermal edges were apposed without significant tension and sutured with sutures. Orbicularis Oris Muscle Flap Text: The defect edges were debeveled with a #15 scalpel blade.  Given that the defect affected the competency of the oral sphincter an orbicularis oris muscle flap was deemed most appropriate to restore this competency and normal muscle function.  Using a sterile surgical marker, an appropriate flap was drawn incorporating the defect. The area thus outlined was incised with a #15 scalpel blade. Melolabial Transposition Flap Text: The defect edges were debeveled with a #15 scalpel blade.  Given the location of the defect and the proximity to free margins a melolabial flap was deemed most appropriate.  Using a sterile surgical marker, an appropriate melolabial transposition flap was drawn incorporating the defect.    The area thus outlined was incised deep to adipose tissue with a #15 scalpel blade.  The skin margins were undermined to an appropriate distance in all directions utilizing iris scissors. Rhombic Flap Text: The defect edges were debeveled with a #15 scalpel blade.  Given the location of the defect and the proximity to free margins a rhombic flap was deemed most appropriate.  Using a sterile surgical marker, an appropriate rhombic flap was drawn incorporating the defect.    The area thus outlined was incised deep to adipose tissue with a #15 scalpel blade.  The skin margins were undermined to an appropriate distance in all directions utilizing iris scissors. Rhomboid Transposition Flap Text: The defect edges were debeveled with a #15 scalpel blade.  Given the location of the defect and the proximity to free margins a rhomboid transposition flap was deemed most appropriate.  Using a sterile surgical marker, an appropriate rhomboid flap was drawn incorporating the defect.    The area thus outlined was incised deep to adipose tissue with a #15 scalpel blade.  The skin margins were undermined to an appropriate distance in all directions utilizing iris scissors. Bi-Rhombic Flap Text: The defect edges were debeveled with a #15 scalpel blade.  Given the location of the defect and the proximity to free margins a bi-rhombic flap was deemed most appropriate.  Using a sterile surgical marker, an appropriate rhombic flap was drawn incorporating the defect. The area thus outlined was incised deep to adipose tissue with a #15 scalpel blade.  The skin margins were undermined to an appropriate distance in all directions utilizing iris scissors. Helical Rim Advancement Flap Text: The defect edges were debeveled with a #15 blade scalpel.  Given the location of the defect and the proximity to free margins (helical rim) a double helical rim advancement flap was deemed most appropriate.  Using a sterile surgical marker, the appropriate advancement flaps were drawn incorporating the defect and placing the expected incisions between the helical rim and antihelix where possible.  The area thus outlined was incised through and through with a #15 scalpel blade.  With a skin hook and iris scissors, the flaps were gently and sharply undermined and freed up. Bilateral Helical Rim Advancement Flap Text: The defect edges were debeveled with a #15 blade scalpel.  Given the location of the defect and the proximity to free margins (helical rim) a bilateral helical rim advancement flap was deemed most appropriate.  Using a sterile surgical marker, the appropriate advancement flaps were drawn incorporating the defect and placing the expected incisions between the helical rim and antihelix where possible.  The area thus outlined was incised through and through with a #15 scalpel blade.  With a skin hook and iris scissors, the flaps were gently and sharply undermined and freed up. Ear Star Wedge Flap Text: The defect edges were debeveled with a #15 blade scalpel.  Given the location of the defect and the proximity to free margins (helical rim) an ear star wedge flap was deemed most appropriate.  Using a sterile surgical marker, the appropriate flap was drawn incorporating the defect and placing the expected incisions between the helical rim and antihelix where possible.  The area thus outlined was incised through and through with a #15 scalpel blade. Banner Transposition Flap Text: The defect edges were debeveled with a #15 scalpel blade.  Given the location of the defect and the proximity to free margins a Banner transposition flap was deemed most appropriate.  Using a sterile surgical marker, an appropriate flap drawn around the defect. The area thus outlined was incised deep to adipose tissue with a #15 scalpel blade.  The skin margins were undermined to an appropriate distance in all directions utilizing iris scissors. Bilobed Flap Text: The defect edges were debeveled with a #15 scalpel blade.  Given the location of the defect and the proximity to free margins a bilobe flap was deemed most appropriate.  Using a sterile surgical marker, an appropriate bilobe flap drawn around the defect.    The area thus outlined was incised deep to adipose tissue with a #15 scalpel blade.  The skin margins were undermined to an appropriate distance in all directions utilizing iris scissors. Bilobed Transposition Flap Text: The defect edges were debeveled with a #15 scalpel blade.  Given the location of the defect and the proximity to free margins a bilobed transposition flap was deemed most appropriate.  Using a sterile surgical marker, an appropriate bilobe flap drawn around the defect.    The area thus outlined was incised deep to adipose tissue with a #15 scalpel blade.  The skin margins were undermined to an appropriate distance in all directions utilizing iris scissors. Trilobed Flap Text: The defect edges were debeveled with a #15 scalpel blade.  Given the location of the defect and the proximity to free margins a trilobed flap was deemed most appropriate.  Using a sterile surgical marker, an appropriate trilobed flap drawn around the defect.    The area thus outlined was incised deep to adipose tissue with a #15 scalpel blade.  The skin margins were undermined to an appropriate distance in all directions utilizing iris scissors. Dorsal Nasal Flap Text: The defect edges were debeveled with a #15 scalpel blade.  Given the location of the defect and the proximity to free margins a dorsal nasal flap was deemed most appropriate.  Using a sterile surgical marker, an appropriate dorsal nasal flap was drawn around the defect.    The area thus outlined was incised deep to adipose tissue with a #15 scalpel blade.  The skin margins were undermined to an appropriate distance in all directions utilizing iris scissors. Island Pedicle Flap Text: The defect edges were debeveled with a #15 scalpel blade.  Given the location of the defect, shape of the defect and the proximity to free margins an island pedicle advancement flap was deemed most appropriate.  Using a sterile surgical marker, an appropriate advancement flap was drawn incorporating the defect, outlining the appropriate donor tissue and placing the expected incisions within the relaxed skin tension lines where possible.    The area thus outlined was incised deep to adipose tissue with a #15 scalpel blade.  The skin margins were undermined to an appropriate distance in all directions around the primary defect and laterally outward around the island pedicle utilizing iris scissors.  There was minimal undermining beneath the pedicle flap. Island Pedicle Flap With Canthal Suspension Text: The defect edges were debeveled with a #15 scalpel blade.  Given the location of the defect, shape of the defect and the proximity to free margins an island pedicle advancement flap was deemed most appropriate.  Using a sterile surgical marker, an appropriate advancement flap was drawn incorporating the defect, outlining the appropriate donor tissue and placing the expected incisions within the relaxed skin tension lines where possible. The area thus outlined was incised deep to adipose tissue with a #15 scalpel blade.  The skin margins were undermined to an appropriate distance in all directions around the primary defect and laterally outward around the island pedicle utilizing iris scissors.  There was minimal undermining beneath the pedicle flap. A suspension suture was placed in the canthal tendon to prevent tension and prevent ectropion. Alar Island Pedicle Flap Text: The defect edges were debeveled with a #15 scalpel blade.  Given the location of the defect, shape of the defect and the proximity to the alar rim an island pedicle advancement flap was deemed most appropriate.  Using a sterile surgical marker, an appropriate advancement flap was drawn incorporating the defect, outlining the appropriate donor tissue and placing the expected incisions within the nasal ala running parallel to the alar rim. The area thus outlined was incised with a #15 scalpel blade.  The skin margins were undermined minimally to an appropriate distance in all directions around the primary defect and laterally outward around the island pedicle utilizing iris scissors.  There was minimal undermining beneath the pedicle flap. Double Island Pedicle Flap Text: The defect edges were debeveled with a #15 scalpel blade.  Given the location of the defect, shape of the defect and the proximity to free margins a double island pedicle advancement flap was deemed most appropriate.  Using a sterile surgical marker, an appropriate advancement flap was drawn incorporating the defect, outlining the appropriate donor tissue and placing the expected incisions within the relaxed skin tension lines where possible.    The area thus outlined was incised deep to adipose tissue with a #15 scalpel blade.  The skin margins were undermined to an appropriate distance in all directions around the primary defect and laterally outward around the island pedicle utilizing iris scissors.  There was minimal undermining beneath the pedicle flap. Island Pedicle Flap-Requiring Vessel Identification Text: The defect edges were debeveled with a #15 scalpel blade.  Given the location of the defect, shape of the defect and the proximity to free margins an island pedicle advancement flap was deemed most appropriate.  Using a sterile surgical marker, an appropriate advancement flap was drawn, based on the axial vessel mentioned above, incorporating the defect, outlining the appropriate donor tissue and placing the expected incisions within the relaxed skin tension lines where possible.    The area thus outlined was incised deep to adipose tissue with a #15 scalpel blade.  The skin margins were undermined to an appropriate distance in all directions around the primary defect and laterally outward around the island pedicle utilizing iris scissors.  There was minimal undermining beneath the pedicle flap. Keystone Flap Text: The defect edges were debeveled with a #15 scalpel blade.  Given the location of the defect, shape of the defect a keystone flap was deemed most appropriate.  Using a sterile surgical marker, an appropriate keystone flap was drawn incorporating the defect, outlining the appropriate donor tissue and placing the expected incisions within the relaxed skin tension lines where possible. The area thus outlined was incised deep to adipose tissue with a #15 scalpel blade.  The skin margins were undermined to an appropriate distance in all directions around the primary defect and laterally outward around the flap utilizing iris scissors. O-T Plasty Text: The defect edges were debeveled with a #15 scalpel blade.  Given the location of the defect, shape of the defect and the proximity to free margins an O-T plasty was deemed most appropriate.  Using a sterile surgical marker, an appropriate O-T plasty was drawn incorporating the defect and placing the expected incisions within the relaxed skin tension lines where possible.    The area thus outlined was incised deep to adipose tissue with a #15 scalpel blade.  The skin margins were undermined to an appropriate distance in all directions utilizing iris scissors. O-Z Plasty Text: The defect edges were debeveled with a #15 scalpel blade.  Given the location of the defect, shape of the defect and the proximity to free margins an O-Z plasty (double transposition flap) was deemed most appropriate.  Using a sterile surgical marker, the appropriate transposition flaps were drawn incorporating the defect and placing the expected incisions within the relaxed skin tension lines where possible.    The area thus outlined was incised deep to adipose tissue with a #15 scalpel blade.  The skin margins were undermined to an appropriate distance in all directions utilizing iris scissors.  Hemostasis was achieved with electrocautery.  The flaps were then transposed into place, one clockwise and the other counterclockwise, and anchored with interrupted buried subcutaneous sutures. Double O-Z Plasty Text: The defect edges were debeveled with a #15 scalpel blade.  Given the location of the defect, shape of the defect and the proximity to free margins a Double O-Z plasty (double transposition flap) was deemed most appropriate.  Using a sterile surgical marker, the appropriate transposition flaps were drawn incorporating the defect and placing the expected incisions within the relaxed skin tension lines where possible. The area thus outlined was incised deep to adipose tissue with a #15 scalpel blade.  The skin margins were undermined to an appropriate distance in all directions utilizing iris scissors.  Hemostasis was achieved with electrocautery.  The flaps were then transposed into place, one clockwise and the other counterclockwise, and anchored with interrupted buried subcutaneous sutures. V-Y Plasty Text: The defect edges were debeveled with a #15 scalpel blade.  Given the location of the defect, shape of the defect and the proximity to free margins an V-Y advancement flap was deemed most appropriate.  Using a sterile surgical marker, an appropriate advancement flap was drawn incorporating the defect and placing the expected incisions within the relaxed skin tension lines where possible.    The area thus outlined was incised deep to adipose tissue with a #15 scalpel blade.  The skin margins were undermined to an appropriate distance in all directions utilizing iris scissors. H Plasty Text: Given the location of the defect, shape of the defect and the proximity to free margins a H-plasty was deemed most appropriate for repair.  Using a sterile surgical marker, the appropriate advancement arms of the H-plasty were drawn incorporating the defect and placing the expected incisions within the relaxed skin tension lines where possible. The area thus outlined was incised deep to adipose tissue with a #15 scalpel blade. The skin margins were undermined to an appropriate distance in all directions utilizing iris scissors.  The opposing advancement arms were then advanced into place in opposite direction and anchored with interrupted buried subcutaneous sutures. W Plasty Text: The lesion was extirpated to the level of the fat with a #15 scalpel blade.  Given the location of the defect, shape of the defect and the proximity to free margins a W-plasty was deemed most appropriate for repair.  Using a sterile surgical marker, the appropriate transposition arms of the W-plasty were drawn incorporating the defect and placing the expected incisions within the relaxed skin tension lines where possible.    The area thus outlined was incised deep to adipose tissue with a #15 scalpel blade.  The skin margins were undermined to an appropriate distance in all directions utilizing iris scissors.  The opposing transposition arms were then transposed into place in opposite direction and anchored with interrupted buried subcutaneous sutures. Z Plasty Text: The lesion was extirpated to the level of the fat with a #15 scalpel blade.  Given the location of the defect, shape of the defect and the proximity to free margins a Z-plasty was deemed most appropriate for repair.  Using a sterile surgical marker, the appropriate transposition arms of the Z-plasty were drawn incorporating the defect and placing the expected incisions within the relaxed skin tension lines where possible.    The area thus outlined was incised deep to adipose tissue with a #15 scalpel blade.  The skin margins were undermined to an appropriate distance in all directions utilizing iris scissors.  The opposing transposition arms were then transposed into place in opposite direction and anchored with interrupted buried subcutaneous sutures. Zygomaticofacial Flap Text: Given the location of the defect, shape of the defect and the proximity to free margins a zygomaticofacial flap was deemed most appropriate for repair.  Using a sterile surgical marker, the appropriate flap was drawn incorporating the defect and placing the expected incisions within the relaxed skin tension lines where possible. The area thus outlined was incised deep to adipose tissue with a #15 scalpel blade with preservation of a vascular pedicle.  The skin margins were undermined to an appropriate distance in all directions utilizing iris scissors.  The flap was then placed into the defect and anchored with interrupted buried subcutaneous sutures. Cheek Interpolation Flap Text: A decision was made to reconstruct the defect utilizing an interpolation axial flap and a staged reconstruction.  A telfa template was made of the defect.  This telfa template was then used to outline the Cheek Interpolation flap.  The donor area for the pedicle flap was then injected with anesthesia.  The flap was excised through the skin and subcutaneous tissue down to the layer of the underlying musculature.  The interpolation flap was carefully excised within this deep plane to maintain its blood supply.  The edges of the donor site were undermined.   The donor site was closed in a primary fashion.  The pedicle was then rotated into position and sutured.  Once the tube was sutured into place, adequate blood supply was confirmed with blanching and refill.  The pedicle was then wrapped with xeroform gauze and dressed appropriately with a telfa and gauze bandage to ensure continued blood supply and protect the attached pedicle. Cheek-To-Nose Interpolation Flap Text: A decision was made to reconstruct the defect utilizing an interpolation axial flap and a staged reconstruction.  A telfa template was made of the defect.  This telfa template was then used to outline the Cheek-To-Nose Interpolation flap.  The donor area for the pedicle flap was then injected with anesthesia.  The flap was excised through the skin and subcutaneous tissue down to the layer of the underlying musculature.  The interpolation flap was carefully excised within this deep plane to maintain its blood supply.  The edges of the donor site were undermined.   The donor site was closed in a primary fashion.  The pedicle was then rotated into position and sutured.  Once the tube was sutured into place, adequate blood supply was confirmed with blanching and refill.  The pedicle was then wrapped with xeroform gauze and dressed appropriately with a telfa and gauze bandage to ensure continued blood supply and protect the attached pedicle. Interpolation Flap Text: A decision was made to reconstruct the defect utilizing an interpolation axial flap and a staged reconstruction.  A telfa template was made of the defect.  This telfa template was then used to outline the interpolation flap.  The donor area for the pedicle flap was then injected with anesthesia.  The flap was excised through the skin and subcutaneous tissue down to the layer of the underlying musculature.  The interpolation flap was carefully excised within this deep plane to maintain its blood supply.  The edges of the donor site were undermined.   The donor site was closed in a primary fashion.  The pedicle was then rotated into position and sutured.  Once the tube was sutured into place, adequate blood supply was confirmed with blanching and refill.  The pedicle was then wrapped with xeroform gauze and dressed appropriately with a telfa and gauze bandage to ensure continued blood supply and protect the attached pedicle. Melolabial Interpolation Flap Text: A decision was made to reconstruct the defect utilizing an interpolation axial flap and a staged reconstruction.  A telfa template was made of the defect.  This telfa template was then used to outline the melolabial interpolation flap.  The donor area for the pedicle flap was then injected with anesthesia.  The flap was excised through the skin and subcutaneous tissue down to the layer of the underlying musculature.  The pedicle flap was carefully excised within this deep plane to maintain its blood supply.  The edges of the donor site were undermined.   The donor site was closed in a primary fashion.  The pedicle was then rotated into position and sutured.  Once the tube was sutured into place, adequate blood supply was confirmed with blanching and refill.  The pedicle was then wrapped with xeroform gauze and dressed appropriately with a telfa and gauze bandage to ensure continued blood supply and protect the attached pedicle. Mastoid Interpolation Flap Text: A decision was made to reconstruct the defect utilizing an interpolation axial flap and a staged reconstruction.  A telfa template was made of the defect.  This telfa template was then used to outline the mastoid interpolation flap.  The donor area for the pedicle flap was then injected with anesthesia.  The flap was excised through the skin and subcutaneous tissue down to the layer of the underlying musculature.  The pedicle flap was carefully excised within this deep plane to maintain its blood supply.  The edges of the donor site were undermined.   The donor site was closed in a primary fashion.  The pedicle was then rotated into position and sutured.  Once the tube was sutured into place, adequate blood supply was confirmed with blanching and refill.  The pedicle was then wrapped with xeroform gauze and dressed appropriately with a telfa and gauze bandage to ensure continued blood supply and protect the attached pedicle. Posterior Auricular Interpolation Flap Text: A decision was made to reconstruct the defect utilizing an interpolation axial flap and a staged reconstruction.  A telfa template was made of the defect.  This telfa template was then used to outline the posterior auricular interpolation flap.  The donor area for the pedicle flap was then injected with anesthesia.  The flap was excised through the skin and subcutaneous tissue down to the layer of the underlying musculature.  The pedicle flap was carefully excised within this deep plane to maintain its blood supply.  The edges of the donor site were undermined.   The donor site was closed in a primary fashion.  The pedicle was then rotated into position and sutured.  Once the tube was sutured into place, adequate blood supply was confirmed with blanching and refill.  The pedicle was then wrapped with xeroform gauze and dressed appropriately with a telfa and gauze bandage to ensure continued blood supply and protect the attached pedicle. Paramedian Forehead Flap Text: A decision was made to reconstruct the defect utilizing an interpolation axial flap and a staged reconstruction.  A telfa template was made of the defect.  This telfa template was then used to outline the paramedian forehead pedicle flap.  The donor area for the pedicle flap was then injected with anesthesia.  The flap was excised through the skin and subcutaneous tissue down to the layer of the underlying musculature.  The pedicle flap was carefully excised within this deep plane to maintain its blood supply.  The edges of the donor site were undermined.   The donor site was closed in a primary fashion.  The pedicle was then rotated into position and sutured.  Once the tube was sutured into place, adequate blood supply was confirmed with blanching and refill.  The pedicle was then wrapped with xeroform gauze and dressed appropriately with a telfa and gauze bandage to ensure continued blood supply and protect the attached pedicle. Lip Wedge Excision Repair Text: Given the location of the defect and the proximity to free margins a full thickness wedge repair was deemed most appropriate.  Using a sterile surgical marker, the appropriate repair was drawn incorporating the defect and placing the expected incisions perpendicular to the vermilion border.  The vermilion border was also meticulously outlined to ensure appropriate reapproximation during the repair.  The area thus outlined was incised through and through with a #15 scalpel blade.  The muscularis and dermis were reaproximated with deep sutures following hemostasis. Care was taken to realign the vermilion border before proceeding with the superficial closure.  Once the vermilion was realigned the superfical and mucosal closure was finished. Ftsg Text: The defect edges were debeveled with a #15 scalpel blade.  Given the location of the defect, shape of the defect and the proximity to free margins a full thickness skin graft was deemed most appropriate.  Using a sterile surgical marker, the primary defect shape was transferred to the donor site. The area thus outlined was incised deep to adipose tissue with a #15 scalpel blade.  The harvested graft was then trimmed of adipose tissue until only dermis and epidermis was left.  The skin margins of the secondary defect were undermined to an appropriate distance in all directions utilizing iris scissors.  The secondary defect was closed with interrupted buried subcutaneous sutures.  The skin edges were then re-apposed with running  sutures.  The skin graft was then placed in the primary defect and oriented appropriately. Split-Thickness Skin Graft Text: The defect edges were debeveled with a #15 scalpel blade.  Given the location of the defect, shape of the defect and the proximity to free margins a split thickness skin graft was deemed most appropriate.  Using a sterile surgical marker, the primary defect shape was transferred to the donor site. The split thickness graft was then harvested.  The skin graft was then placed in the primary defect and oriented appropriately. Burow's Graft Text: The defect edges were debeveled with a #15 scalpel blade.  Given the location of the defect, shape of the defect, the proximity to free margins and the presence of a standing cone deformity a Burow's skin graft was deemed most appropriate. The standing cone was removed and this tissue was then trimmed to the shape of the primary defect. The adipose tissue was also removed until only dermis and epidermis were left.  The skin margins of the secondary defect were undermined to an appropriate distance in all directions utilizing iris scissors.  The secondary defect was closed with interrupted buried subcutaneous sutures.  The skin edges were then re-apposed with running  sutures.  The skin graft was then placed in the primary defect and oriented appropriately. Cartilage Graft Text: The defect edges were debeveled with a #15 scalpel blade.  Given the location of the defect, shape of the defect, the fact the defect involved a full thickness cartilage defect a cartilage graft was deemed most appropriate.  An appropriate donor site was identified, cleansed, and anesthetized. The cartilage graft was then harvested and transferred to the recipient site, oriented appropriately and then sutured into place.  The secondary defect was then repaired using a primary closure. Composite Graft Text: The defect edges were debeveled with a #15 scalpel blade.  Given the location of the defect, shape of the defect, the proximity to free margins and the fact the defect was full thickness a composite graft was deemed most appropriate.  The defect was outline and then transferred to the donor site.  A full thickness graft was then excised from the donor site. The graft was then placed in the primary defect, oriented appropriately and then sutured into place.  The secondary defect was then repaired using a primary closure. Epidermal Autograft Text: The defect edges were debeveled with a #15 scalpel blade.  Given the location of the defect, shape of the defect and the proximity to free margins an epidermal autograft was deemed most appropriate.  Using a sterile surgical marker, the primary defect shape was transferred to the donor site. The epidermal graft was then harvested.  The skin graft was then placed in the primary defect and oriented appropriately. Dermal Autograft Text: The defect edges were debeveled with a #15 scalpel blade.  Given the location of the defect, shape of the defect and the proximity to free margins a dermal autograft was deemed most appropriate.  Using a sterile surgical marker, the primary defect shape was transferred to the donor site. The area thus outlined was incised deep to adipose tissue with a #15 scalpel blade.  The harvested graft was then trimmed of adipose and epidermal tissue until only dermis was left.  The skin graft was then placed in the primary defect and oriented appropriately. Skin Substitute Text: The defect edges were debeveled with a #15 scalpel blade.  Given the location of the defect, shape of the defect and the proximity to free margins a skin substitute graft was deemed most appropriate.  The graft material was trimmed to fit the size of the defect. The graft was then placed in the primary defect and oriented appropriately. Tissue Cultured Epidermal Autograft Text: The defect edges were debeveled with a #15 scalpel blade.  Given the location of the defect, shape of the defect and the proximity to free margins a tissue cultured epidermal autograft was deemed most appropriate.  The graft was then trimmed to fit the size of the defect.  The graft was then placed in the primary defect and oriented appropriately. Xenograft Text: The defect edges were debeveled with a #15 scalpel blade.  Given the location of the defect, shape of the defect and the proximity to free margins a xenograft was deemed most appropriate.  The graft was then trimmed to fit the size of the defect.  The graft was then placed in the primary defect and oriented appropriately. Purse String (Intermediate) Text: Given the location of the defect and the characteristics of the surrounding skin a purse string intermediate closure was deemed most appropriate.  Undermining was performed circumferentially around the surgical defect.  A purse string suture was then placed and tightened. Purse String (Simple) Text: Given the location of the defect and the characteristics of the surrounding skin a purse string simple closure was deemed most appropriate.  Undermining was performed circumferentially around the surgical defect.  A purse string suture was then placed and tightened. Complex Repair And Single Advancement Flap Text: The defect edges were debeveled with a #15 scalpel blade.  The primary defect was closed partially with a complex linear closure.  Given the location of the remaining defect, shape of the defect and the proximity to free margins a single advancement flap was deemed most appropriate for complete closure of the defect.  Using a sterile surgical marker, an appropriate advancement flap was drawn incorporating the defect and placing the expected incisions within the relaxed skin tension lines where possible.    The area thus outlined was incised deep to adipose tissue with a #15 scalpel blade.  The skin margins were undermined to an appropriate distance in all directions utilizing iris scissors. Complex Repair And Double Advancement Flap Text: The defect edges were debeveled with a #15 scalpel blade.  The primary defect was closed partially with a complex linear closure.  Given the location of the remaining defect, shape of the defect and the proximity to free margins a double advancement flap was deemed most appropriate for complete closure of the defect.  Using a sterile surgical marker, an appropriate advancement flap was drawn incorporating the defect and placing the expected incisions within the relaxed skin tension lines where possible.    The area thus outlined was incised deep to adipose tissue with a #15 scalpel blade.  The skin margins were undermined to an appropriate distance in all directions utilizing iris scissors. Complex Repair And Modified Advancement Flap Text: The defect edges were debeveled with a #15 scalpel blade.  The primary defect was closed partially with a complex linear closure.  Given the location of the remaining defect, shape of the defect and the proximity to free margins a modified advancement flap was deemed most appropriate for complete closure of the defect.  Using a sterile surgical marker, an appropriate advancement flap was drawn incorporating the defect and placing the expected incisions within the relaxed skin tension lines where possible.    The area thus outlined was incised deep to adipose tissue with a #15 scalpel blade.  The skin margins were undermined to an appropriate distance in all directions utilizing iris scissors. Complex Repair And A-T Advancement Flap Text: The defect edges were debeveled with a #15 scalpel blade.  The primary defect was closed partially with a complex linear closure.  Given the location of the remaining defect, shape of the defect and the proximity to free margins an A-T advancement flap was deemed most appropriate for complete closure of the defect.  Using a sterile surgical marker, an appropriate advancement flap was drawn incorporating the defect and placing the expected incisions within the relaxed skin tension lines where possible.    The area thus outlined was incised deep to adipose tissue with a #15 scalpel blade.  The skin margins were undermined to an appropriate distance in all directions utilizing iris scissors. Complex Repair And O-T Advancement Flap Text: The defect edges were debeveled with a #15 scalpel blade.  The primary defect was closed partially with a complex linear closure.  Given the location of the remaining defect, shape of the defect and the proximity to free margins an O-T advancement flap was deemed most appropriate for complete closure of the defect.  Using a sterile surgical marker, an appropriate advancement flap was drawn incorporating the defect and placing the expected incisions within the relaxed skin tension lines where possible.    The area thus outlined was incised deep to adipose tissue with a #15 scalpel blade.  The skin margins were undermined to an appropriate distance in all directions utilizing iris scissors. Complex Repair And O-L Flap Text: The defect edges were debeveled with a #15 scalpel blade.  The primary defect was closed partially with a complex linear closure.  Given the location of the remaining defect, shape of the defect and the proximity to free margins an O-L flap was deemed most appropriate for complete closure of the defect.  Using a sterile surgical marker, an appropriate flap was drawn incorporating the defect and placing the expected incisions within the relaxed skin tension lines where possible.    The area thus outlined was incised deep to adipose tissue with a #15 scalpel blade.  The skin margins were undermined to an appropriate distance in all directions utilizing iris scissors. Complex Repair And Bilobe Flap Text: The defect edges were debeveled with a #15 scalpel blade.  The primary defect was closed partially with a complex linear closure.  Given the location of the remaining defect, shape of the defect and the proximity to free margins a bilobe flap was deemed most appropriate for complete closure of the defect.  Using a sterile surgical marker, an appropriate advancement flap was drawn incorporating the defect and placing the expected incisions within the relaxed skin tension lines where possible.    The area thus outlined was incised deep to adipose tissue with a #15 scalpel blade.  The skin margins were undermined to an appropriate distance in all directions utilizing iris scissors. Complex Repair And Melolabial Flap Text: The defect edges were debeveled with a #15 scalpel blade.  The primary defect was closed partially with a complex linear closure.  Given the location of the remaining defect, shape of the defect and the proximity to free margins a melolabial flap was deemed most appropriate for complete closure of the defect.  Using a sterile surgical marker, an appropriate advancement flap was drawn incorporating the defect and placing the expected incisions within the relaxed skin tension lines where possible.    The area thus outlined was incised deep to adipose tissue with a #15 scalpel blade.  The skin margins were undermined to an appropriate distance in all directions utilizing iris scissors. Complex Repair And Rotation Flap Text: The defect edges were debeveled with a #15 scalpel blade.  The primary defect was closed partially with a complex linear closure.  Given the location of the remaining defect, shape of the defect and the proximity to free margins a rotation flap was deemed most appropriate for complete closure of the defect.  Using a sterile surgical marker, an appropriate advancement flap was drawn incorporating the defect and placing the expected incisions within the relaxed skin tension lines where possible.    The area thus outlined was incised deep to adipose tissue with a #15 scalpel blade.  The skin margins were undermined to an appropriate distance in all directions utilizing iris scissors. Complex Repair And Rhombic Flap Text: The defect edges were debeveled with a #15 scalpel blade.  The primary defect was closed partially with a complex linear closure.  Given the location of the remaining defect, shape of the defect and the proximity to free margins a rhombic flap was deemed most appropriate for complete closure of the defect.  Using a sterile surgical marker, an appropriate advancement flap was drawn incorporating the defect and placing the expected incisions within the relaxed skin tension lines where possible.    The area thus outlined was incised deep to adipose tissue with a #15 scalpel blade.  The skin margins were undermined to an appropriate distance in all directions utilizing iris scissors. Complex Repair And Transposition Flap Text: The defect edges were debeveled with a #15 scalpel blade.  The primary defect was closed partially with a complex linear closure.  Given the location of the remaining defect, shape of the defect and the proximity to free margins a transposition flap was deemed most appropriate for complete closure of the defect.  Using a sterile surgical marker, an appropriate advancement flap was drawn incorporating the defect and placing the expected incisions within the relaxed skin tension lines where possible.    The area thus outlined was incised deep to adipose tissue with a #15 scalpel blade.  The skin margins were undermined to an appropriate distance in all directions utilizing iris scissors. Complex Repair And V-Y Plasty Text: The defect edges were debeveled with a #15 scalpel blade.  The primary defect was closed partially with a complex linear closure.  Given the location of the remaining defect, shape of the defect and the proximity to free margins a V-Y plasty was deemed most appropriate for complete closure of the defect.  Using a sterile surgical marker, an appropriate advancement flap was drawn incorporating the defect and placing the expected incisions within the relaxed skin tension lines where possible.    The area thus outlined was incised deep to adipose tissue with a #15 scalpel blade.  The skin margins were undermined to an appropriate distance in all directions utilizing iris scissors. Complex Repair And M Plasty Text: The defect edges were debeveled with a #15 scalpel blade.  The primary defect was closed partially with a complex linear closure.  Given the location of the remaining defect, shape of the defect and the proximity to free margins an M plasty was deemed most appropriate for complete closure of the defect.  Using a sterile surgical marker, an appropriate advancement flap was drawn incorporating the defect and placing the expected incisions within the relaxed skin tension lines where possible.    The area thus outlined was incised deep to adipose tissue with a #15 scalpel blade.  The skin margins were undermined to an appropriate distance in all directions utilizing iris scissors. Complex Repair And Double M Plasty Text: The defect edges were debeveled with a #15 scalpel blade.  The primary defect was closed partially with a complex linear closure.  Given the location of the remaining defect, shape of the defect and the proximity to free margins a double M plasty was deemed most appropriate for complete closure of the defect.  Using a sterile surgical marker, an appropriate advancement flap was drawn incorporating the defect and placing the expected incisions within the relaxed skin tension lines where possible.    The area thus outlined was incised deep to adipose tissue with a #15 scalpel blade.  The skin margins were undermined to an appropriate distance in all directions utilizing iris scissors. Complex Repair And W Plasty Text: The defect edges were debeveled with a #15 scalpel blade.  The primary defect was closed partially with a complex linear closure.  Given the location of the remaining defect, shape of the defect and the proximity to free margins a W plasty was deemed most appropriate for complete closure of the defect.  Using a sterile surgical marker, an appropriate advancement flap was drawn incorporating the defect and placing the expected incisions within the relaxed skin tension lines where possible.    The area thus outlined was incised deep to adipose tissue with a #15 scalpel blade.  The skin margins were undermined to an appropriate distance in all directions utilizing iris scissors. Complex Repair And Z Plasty Text: The defect edges were debeveled with a #15 scalpel blade.  The primary defect was closed partially with a complex linear closure.  Given the location of the remaining defect, shape of the defect and the proximity to free margins a Z plasty was deemed most appropriate for complete closure of the defect.  Using a sterile surgical marker, an appropriate advancement flap was drawn incorporating the defect and placing the expected incisions within the relaxed skin tension lines where possible.    The area thus outlined was incised deep to adipose tissue with a #15 scalpel blade.  The skin margins were undermined to an appropriate distance in all directions utilizing iris scissors. Complex Repair And Dorsal Nasal Flap Text: The defect edges were debeveled with a #15 scalpel blade.  The primary defect was closed partially with a complex linear closure.  Given the location of the remaining defect, shape of the defect and the proximity to free margins a dorsal nasal flap was deemed most appropriate for complete closure of the defect.  Using a sterile surgical marker, an appropriate flap was drawn incorporating the defect and placing the expected incisions within the relaxed skin tension lines where possible.    The area thus outlined was incised deep to adipose tissue with a #15 scalpel blade.  The skin margins were undermined to an appropriate distance in all directions utilizing iris scissors. Complex Repair And Ftsg Text: The defect edges were debeveled with a #15 scalpel blade.  The primary defect was closed partially with a complex linear closure.  Given the location of the defect, shape of the defect and the proximity to free margins a full thickness skin graft was deemed most appropriate to repair the remaining defect.  The graft was trimmed to fit the size of the remaining defect.  The graft was then placed in the primary defect, oriented appropriately, and sutured into place. Complex Repair And Burow's Graft Text: The defect edges were debeveled with a #15 scalpel blade.  The primary defect was closed partially with a complex linear closure.  Given the location of the defect, shape of the defect, the proximity to free margins and the presence of a standing cone deformity a Burow's graft was deemed most appropriate to repair the remaining defect.  The graft was trimmed to fit the size of the remaining defect.  The graft was then placed in the primary defect, oriented appropriately, and sutured into place. Complex Repair And Split-Thickness Skin Graft Text: The defect edges were debeveled with a #15 scalpel blade.  The primary defect was closed partially with a complex linear closure.  Given the location of the defect, shape of the defect and the proximity to free margins a split thickness skin graft was deemed most appropriate to repair the remaining defect.  The graft was trimmed to fit the size of the remaining defect.  The graft was then placed in the primary defect, oriented appropriately, and sutured into place. Complex Repair And Epidermal Autograft Text: The defect edges were debeveled with a #15 scalpel blade.  The primary defect was closed partially with a complex linear closure.  Given the location of the defect, shape of the defect and the proximity to free margins an epidermal autograft was deemed most appropriate to repair the remaining defect.  The graft was trimmed to fit the size of the remaining defect.  The graft was then placed in the primary defect, oriented appropriately, and sutured into place. Complex Repair And Dermal Autograft Text: The defect edges were debeveled with a #15 scalpel blade.  The primary defect was closed partially with a complex linear closure.  Given the location of the defect, shape of the defect and the proximity to free margins an dermal autograft was deemed most appropriate to repair the remaining defect.  The graft was trimmed to fit the size of the remaining defect.  The graft was then placed in the primary defect, oriented appropriately, and sutured into place. Complex Repair And Tissue Cultured Epidermal Autograft Text: The defect edges were debeveled with a #15 scalpel blade.  The primary defect was closed partially with a complex linear closure.  Given the location of the defect, shape of the defect and the proximity to free margins an tissue cultured epidermal autograft was deemed most appropriate to repair the remaining defect.  The graft was trimmed to fit the size of the remaining defect.  The graft was then placed in the primary defect, oriented appropriately, and sutured into place. Complex Repair And Xenograft Text: The defect edges were debeveled with a #15 scalpel blade.  The primary defect was closed partially with a complex linear closure.  Given the location of the defect, shape of the defect and the proximity to free margins a xenograft was deemed most appropriate to repair the remaining defect.  The graft was trimmed to fit the size of the remaining defect.  The graft was then placed in the primary defect, oriented appropriately, and sutured into place. Complex Repair And Skin Substitute Graft Text: The defect edges were debeveled with a #15 scalpel blade.  The primary defect was closed partially with a complex linear closure.  Given the location of the remaining defect, shape of the defect and the proximity to free margins a skin substitute graft was deemed most appropriate to repair the remaining defect.  The graft was trimmed to fit the size of the remaining defect.  The graft was then placed in the primary defect, oriented appropriately, and sutured into place. Consent was obtained from the patient. The risks and benefits to therapy were discussed in detail. Specifically, the risks of infection, scarring, bleeding, prolonged wound healing, incomplete removal, allergy to anesthesia, nerve injury and recurrence were addressed. Prior to the procedure, the treatment site was clearly identified and confirmed by the patient. All components of Universal Protocol/PAUSE Rule completed. Render Post-Care Instructions In Note?: yes Post-Care Instructions: I reviewed with the patient in detail post-care instructions. Patient is not to engage in any heavy lifting, exercise, or swimming for the next 14 days. Should the patient develop any fevers, chills, bleeding, severe pain patient will contact the office immediately. Home Suture Removal Text: Patient was provided a home suture removal kit and will remove their sutures at home.  If they have any questions or difficulties they will call the office. Where Do You Want The Question To Include Opioid Counseling Located?: Case Summary Tab Billing Type: Third-Party Bill Information: Selecting Yes will display possible errors in your note based on the variables you have selected. This validation is only offered as a suggestion for you. PLEASE NOTE THAT THE VALIDATION TEXT WILL BE REMOVED WHEN YOU FINALIZE YOUR NOTE. IF YOU WANT TO FAX A PRELIMINARY NOTE YOU WILL NEED TO TOGGLE THIS TO 'NO' IF YOU DO NOT WANT IT IN YOUR FAXED NOTE.

## 2024-05-21 NOTE — NURSING NOTE
Pt is visible on the unit and attending some groups. Pleasant and cooperative .Denies SI/HI/AH/VH. Pt verbalized he feels better in his new room. Meal compliant. Compliant with scheduled zoloft. Denies any unmet needs or complaints at this time.

## 2024-05-22 PROCEDURE — 99232 SBSQ HOSP IP/OBS MODERATE 35: CPT | Performed by: STUDENT IN AN ORGANIZED HEALTH CARE EDUCATION/TRAINING PROGRAM

## 2024-05-22 RX ADMIN — SERTRALINE HYDROCHLORIDE 50 MG: 50 TABLET ORAL at 08:31

## 2024-05-22 NOTE — NURSING NOTE
Patient observed in the milieu, calm and cooperative. Patient ate breakfast in the patient lounge and was compliant with scheduled medications. Patient refused AM schduled vitamins. Patient denies all psych symptoms at this time and remained in behavioral control. Q7 observation maintained.

## 2024-05-22 NOTE — NURSING NOTE
Patient is visible on unit, sat in dayroom and watched tv for a while and the returned to room. Patient is calm and cooperative. Patient denies SI/HI/AVH. No complaints or unmet needs identified at this time.

## 2024-05-22 NOTE — PLAN OF CARE
Problem: Ineffective Coping  Goal: Cooperates with admission process  Description: Interventions:   - Complete admission process  Outcome: Progressing  Goal: Identifies ineffective coping skills  Outcome: Progressing  Goal: Identifies healthy coping skills  Outcome: Progressing  Goal: Demonstrates healthy coping skills  Outcome: Progressing  Goal: Participates in unit activities  Description: Interventions:  - Provide therapeutic environment   - Provide required programming   - Redirect inappropriate behaviors   Outcome: Progressing     Problem: Risk for Self Injury/Neglect  Goal: Treatment Goal: Remain safe during length of stay, learn and adopt new coping skills, and be free of self-injurious ideation, impulses and acts at the time of discharge  Outcome: Progressing     Problem: Depression  Goal: Treatment Goal: Demonstrate behavioral control of depressive symptoms, verbalize feelings of improved mood/affect, and adopt new coping skills prior to discharge  Outcome: Progressing  Goal: Verbalize thoughts and feelings  Description: Interventions:  - Assess and re-assess patient's level of risk   - Engage patient in 1:1 interactions, daily, for a minimum of 15 minutes   - Encourage patient to express feelings, fears, frustrations, hopes   Outcome: Progressing  Goal: Refrain from harming self  Description: Interventions:  - Monitor patient closely, per order   - Supervise medication ingestion, monitor effects and side effects   Outcome: Progressing  Goal: Refrain from isolation  Description: Interventions:  - Develop a trusting relationship   - Encourage socialization   Outcome: Progressing  Goal: Refrain from self-neglect  Outcome: Progressing  Goal: Attend and participate in unit activities, including therapeutic, recreational, and educational groups  Description: Interventions:  - Provide therapeutic and educational activities daily, encourage attendance and participation, and document same in the medical record    Outcome: Progressing  Goal: Complete daily ADLs, including personal hygiene independently, as able  Description: Interventions:  - Observe, teach, and assist patient with ADLS  -  Monitor and promote a balance of rest/activity, with adequate nutrition and elimination   Outcome: Progressing     Problem: Anxiety  Goal: Anxiety is at manageable level  Description: Interventions:  - Assess and monitor patient's anxiety level.   - Monitor for signs and symptoms (heart palpitations, chest pain, shortness of breath, headaches, nausea, feeling jumpy, restlessness, irritable, apprehensive).   - Collaborate with interdisciplinary team and initiate plan and interventions as ordered.  - [place] patient to unit/surroundings  - Explain treatment plan  - Encourage participation in care  - Encourage verbalization of concerns/fears  - Identify coping mechanisms  - Assist in developing anxiety-reducing skills  - Administer/offer alternative therapies  - Limit or eliminate stimulants  Outcome: Progressing

## 2024-05-22 NOTE — PROGRESS NOTES
05/22/24 0921   Team Meeting   Meeting Type Daily Rounds   Team Members Present   Team Members Present Physician;;Nurse   Physician Team Member Amarilys   Nursing Team Member [facility] Management Team Member Noa   Patient/Family Present   Patient Present No   Patient's Family Present No       Pt is visible on the unit watching TV. Pt is calm and cooperative. Pt is denying SI/HI/AVH. Pt is medication and meal compliant. Pt's discharge is to be determined.

## 2024-05-22 NOTE — PROGRESS NOTES
"Progress Note - Behavioral Health   Franco Roberson 39 y.o. male MRN: [id number]  Unit/Bed#: [facility] 352-02 Encounter: [id number]    Subjective:   Per nursing report, patient has been doing well.  He remains calm and cooperative.  He has been without any acute behavioral issues. Patient is pleasant and cooperative throughout interview, though remains guarded.  Patient reports that he is anxious to discuss results of neuropsych assessment.  Discussed results with patient regarding results being consistent with autism spectrum disorder.  Patient expressed understanding of this and reports that he is understanding of this information.  Discussed need for supportive housing environment and patient was in agreement.  He denies any current SI, HI, or AVH.  He has been tolerating increased dose of Zoloft without any adverse effects.  He denies questions or concerns at this time.    Behavior over the last 24 hours:  unchanged  Sleep: Poor  Appetite: normal  Medication side effects: No  ROS: no complaints and all other systems are negative    Mental Status Evaluation:  Appearance:  dressed appropriately and adequately groomed   Behavior:  calm, cooperative, and guarded   Speech:  soft and scant   Mood:  \"Okay\"   Affect:  constricted   Thought Process:  linear and goal directed   Associations: concrete associations   Thought Content:  no overt delusions   Perceptual Disturbances: denies auditory or visual hallucinations when asked, does not appear responding to internal stimuli   Risk Potential: Suicidal ideation - None  Homicidal ideation - None  Potential for aggression - Not at present   Sensorium:  oriented to person, place, and time/date   Memory:  recent and remote memory grossly intact   Consciousness:  alert and awake   Attention/Concentration: attention span and concentration are age appropriate   Insight:  limited   Judgment: limited   Gait/Station: normal gait/station, normal balance   Motor Activity: no abnormal movements "     Medications: all current active meds have been reviewed.  Current Facility-Administered Medications   Medication Dose Route Frequency Provider Last Rate    acetaminophen  650 mg Oral Q6H PRN Basilio Brown MD      acetaminophen  650 mg Oral Q4H PRN Basilio Brown MD      acetaminophen  975 mg Oral Q6H PRN Basilio Brown MD      aluminum-magnesium hydroxide-simethicone  30 mL Oral Q4H PRN Basilio Brown MD      Artificial Tears  1 drop Both Eyes Q3H PRN Basilio Brown MD      atorvastatin  10 mg Oral Daily With Dinner WALLY Baptiste      benztropine  1 mg Intramuscular Q4H PRN Max 6/day Basilio Brown MD      benztropine  1 mg Oral Q4H PRN Max 6/day Basilio Brown MD      cyanocobalamin  1,000 mcg Oral Daily WALLY Baptiste      cyanocobalamin  1,000 mcg Intramuscular Once WALLY Baptiste      hydrOXYzine HCL  50 mg Oral Q6H PRN Max 4/day Basilio Brown MD      Or    diphenhydrAMINE  50 mg Intramuscular Q6H PRN Basilio Brown MD      ergocalciferol  50,000 Units Oral Weekly WALLY Baptiste      hydrOXYzine HCL  100 mg Oral Q6H PRN Max 4/day Basilio Brown MD      Or    LORazepam  2 mg Intramuscular Q6H PRN Basilio Brown MD      hydrOXYzine HCL  25 mg Oral Q6H PRN Max 4/day Basilio Brown MD      levothyroxine  25 mcg Oral Early Morning WALLY Baptiste      OLANZapine  5 mg Oral Q4H PRN Max 3/day Basilio Brown MD      Or    OLANZapine  2.5 mg Intramuscular Q4H PRN Max 3/day Basilio Brown MD      OLANZapine  5 mg Oral Q3H PRN Max 3/day Basilio Brown MD      Or    OLANZapine  5 mg Intramuscular Q3H PRN Max 3/day Basilio Brown MD      OLANZapine  2.5 mg Oral Q4H PRN Max 6/day Basilio Brown MD      polyethylene glycol  17 g Oral Daily PRN Basilio Brown MD      propranolol  10 mg Oral Q8H PRN Basilio Brown MD      sensonia-docusate sodium  1 tablet Oral  Daily PRN Basilio Brown MD      sertraline  50 mg Oral Daily Basilio Panda MD      traZODone  50 mg Oral HS PRN Basilio rBown MD         Labs: I have personally reviewed all pertinent laboratory/tests results  Most Recent Labs:   Lab Results   Component Value Date    WBC 5.42 05/15/2024    RBC 5.05 05/15/2024    HGB 16.1 05/15/2024    HCT 49.4 (H) 05/15/2024     05/15/2024    RDW 12.6 05/15/2024    NEUTROABS 2.83 05/15/2024    SODIUM 142 05/15/2024    K 4.1 05/15/2024     05/15/2024    CO2 28 05/15/2024    BUN 13 05/15/2024    CREATININE 0.97 05/15/2024    GLUC 89 05/15/2024    CALCIUM 9.3 05/15/2024    AST 21 05/15/2024    ALT 19 05/15/2024    ALKPHOS 66 05/15/2024    TP 7.6 05/15/2024    ALB 4.3 05/15/2024    TBILI 0.69 05/15/2024    CHOLESTEROL 191 05/15/2024    HDL 44 05/15/2024    TRIG 116 05/15/2024    LDLCALC 124 (H) 05/15/2024    NONHDLC 147 05/15/2024    ULZ9DZCURHOH 5.162 (H) 05/15/2024    FREET4 0.60 (L) 05/15/2024    SYPHILISAB Non-reactive 05/15/2024         Assessment & Plan   Principal Problem:    Unspecified depressive disorder (HCC)  Active Problems:    Medical clearance for psychiatric admission    Hyperlipidemia    Vitamin D deficiency    B12 deficiency    Hypothyroidism    Rule Out Autism spectrum disorder    Recommended Treatment:   Continue Zoloft 50 mg daily.  Continue all other medications at current doses as above.  Encourage group therapy, milieu therapy and occupational therapy  All current active medications have been reviewed  Encourage group therapy, milieu therapy and occupational therapy  Behavioral Health checks every 7 minutes  We will be pursuing supportive housing environment for placement.    ----------------------------------------    Progress Toward Goals: progressing    Risks / Benefits of Treatment:    Risks, benefits, and possible side effects of medications explained to patient and patient verbalizes understanding and agreement for  treatment.    Counseling / Coordination of Care:    Patient's progress discussed with staff in treatment team meeting.  Medications, treatment progress and treatment plan reviewed with patient.    Basilio Panda MD 05/22/24

## 2024-05-23 PROCEDURE — 99232 SBSQ HOSP IP/OBS MODERATE 35: CPT | Performed by: STUDENT IN AN ORGANIZED HEALTH CARE EDUCATION/TRAINING PROGRAM

## 2024-05-23 RX ADMIN — SERTRALINE HYDROCHLORIDE 50 MG: 50 TABLET ORAL at 08:39

## 2024-05-23 NOTE — PROGRESS NOTES
05/23/24 0938   Team Meeting   Meeting Type Daily Rounds   Team Members Present   Team Members Present Physician;Nurse;   Physician Team Member Amarilys   Nursing Team Member ConMcLaren Flintjenaro   Care Management Team Member Noa   Patient/Family Present   Patient Present No     Pt has been compliant and cooperative. Pt is medication and meal compliant. Pt's discharge is to be determined.

## 2024-05-23 NOTE — NURSING NOTE
Patient is in the community, quiet and cooperative. Patient denies all psych symptoms at this time. Patient has a depressed affect. Patient compliant with scheduled Zoloft. Q7 observation maintained.

## 2024-05-23 NOTE — PROGRESS NOTES
"Progress Note - Behavioral Health   Franco Roberson 39 y.o. male MRN: [id number]  Unit/Bed#: [facility] 352-02 Encounter: [id number]    Subjective:   Per nursing report, patient has been calm and pleasant on the unit.  He remains scant and guarded but is visible and engaged in groups.  He has been medication compliant.  No acute behavioral episodes overnight. Patient reports feeling \"regressed\".  He states that he has been worried about news from neuropsych evaluation, and worried that he will not be able to care for himself once he is discharged.  Reminded patient of plan for supportive housing environment to ensure that he will be able to take care of himself and have additional support from staff members.  Patient expressed understanding and was feeling better after this discussion.  He denies any current SI, HI, or AVH.  He remains somewhat guarded and constricted, though brightens appropriately at times.  He is tolerating medication regimen fairly well and denies any additional questions or concerns at this time.    Behavior over the last 24 hours:  unchanged  Sleep: normal  Appetite: normal  Medication side effects: No  ROS: no complaints and all other systems are negative    Mental Status Evaluation:  Appearance:  disheveled and marginal hygiene   Behavior:  calm, pleasant, cooperative, and guarded   Speech:  soft and scant   Mood:  \"regressed\"   Affect:  constricted   Thought Process:  linear and goal directed   Associations: concrete associations   Thought Content:  no overt delusions   Perceptual Disturbances: denies auditory or visual hallucinations when asked, does not appear responding to internal stimuli   Risk Potential: Suicidal ideation - None  Homicidal ideation - None  Potential for aggression - Not at present   Sensorium:  oriented to person, place, and time/date   Memory:  recent and remote memory grossly intact   Consciousness:  alert and awake   Attention/Concentration: attention span and concentration " are age appropriate   Insight:  limited   Judgment: limited   Gait/Station: normal gait/station, normal balance   Motor Activity: no abnormal movements     Medications: all current active meds have been reviewed.  Current Facility-Administered Medications   Medication Dose Route Frequency Provider Last Rate    acetaminophen  650 mg Oral Q6H PRN Basilio Brown MD      acetaminophen  650 mg Oral Q4H PRN Basilio Brown MD      acetaminophen  975 mg Oral Q6H PRN Basilio Brown MD      aluminum-magnesium hydroxide-simethicone  30 mL Oral Q4H PRN Basilio Brown MD      Artificial Tears  1 drop Both Eyes Q3H PRN Basilio Brown MD      atorvastatin  10 mg Oral Daily With Dinner WALLY Baptiste      benztropine  1 mg Intramuscular Q4H PRN Max 6/day Basilio Brown MD      benztropine  1 mg Oral Q4H PRN Max 6/day Basilio Brown MD      cyanocobalamin  1,000 mcg Oral Daily WALLY Baptiste      cyanocobalamin  1,000 mcg Intramuscular Once WALLY Baptiste      hydrOXYzine HCL  50 mg Oral Q6H PRN Max 4/day Basilio Brown MD      Or    diphenhydrAMINE  50 mg Intramuscular Q6H PRN Basilio Brown MD      ergocalciferol  50,000 Units Oral Weekly WALLY Baptiste      hydrOXYzine HCL  100 mg Oral Q6H PRN Max 4/day Basilio Brown MD      Or    LORazepam  2 mg Intramuscular Q6H PRN Basilio Brown MD      hydrOXYzine HCL  25 mg Oral Q6H PRN Max 4/day Basilio Brown MD      levothyroxine  25 mcg Oral Early Morning WALLY Baptiste      OLANZapine  5 mg Oral Q4H PRN Max 3/day Basilio Brown MD      Or    OLANZapine  2.5 mg Intramuscular Q4H PRN Max 3/day Basilio Brown MD      OLANZapine  5 mg Oral Q3H PRN Max 3/day Basilio Brown MD      Or    OLANZapine  5 mg Intramuscular Q3H PRN Max 3/day Basilio Brown MD      OLANZapine  2.5 mg Oral Q4H PRN Max 6/day Basilio Brown MD      polyethylene  glycol  17 g Oral Daily PRN Basilio Brown MD      propranolol  10 mg Oral Q8H PRN Basilio Brown MD      senna-docusate sodium  1 tablet Oral Daily PRN Basilio Brown MD      sertraline  50 mg Oral Daily Basilio Panda MD      traZODone  50 mg Oral HS PRN Basilio Brown MD         Labs: I have personally reviewed all pertinent laboratory/tests results  Most Recent Labs:   Lab Results   Component Value Date    WBC 5.42 05/15/2024    RBC 5.05 05/15/2024    HGB 16.1 05/15/2024    HCT 49.4 (H) 05/15/2024     05/15/2024    RDW 12.6 05/15/2024    NEUTROABS 2.83 05/15/2024    SODIUM 142 05/15/2024    K 4.1 05/15/2024     05/15/2024    CO2 28 05/15/2024    BUN 13 05/15/2024    CREATININE 0.97 05/15/2024    GLUC 89 05/15/2024    CALCIUM 9.3 05/15/2024    AST 21 05/15/2024    ALT 19 05/15/2024    ALKPHOS 66 05/15/2024    TP 7.6 05/15/2024    ALB 4.3 05/15/2024    TBILI 0.69 05/15/2024    CHOLESTEROL 191 05/15/2024    HDL 44 05/15/2024    TRIG 116 05/15/2024    LDLCALC 124 (H) 05/15/2024    NONHDLC 147 05/15/2024    [id number] 5.162 (H) 05/15/2024    FREET4 0.60 (L) 05/15/2024    SYPHILISAB Non-reactive 05/15/2024         Assessment & Plan   Principal Problem:    Unspecified depressive disorder (HCC)  Active Problems:    Medical clearance for psychiatric admission    Hyperlipidemia    Vitamin D deficiency    B12 deficiency    Hypothyroidism    Rule Out Autism spectrum disorder    Recommended Treatment:   Continue Zoloft 50 mg daily.  Continue all other medications at current doses as above.  Encourage group therapy, milieu therapy and occupational therapy  All current active medications have been reviewed  Encourage group therapy, milieu therapy and occupational therapy  Behavioral Health checks every 7 minutes  We will plan to pursue supportive housing environment as patient cannot appropriately care for self.    ----------------------------------------    Progress Toward Goals:  progressing, patient remains unable to appropriately care for self and will likely need supportive housing environment    Risks / Benefits of Treatment:    Risks, benefits, and possible side effects of medications explained to patient and patient verbalizes understanding and agreement for treatment.    Counseling / Coordination of Care:    Patient's progress discussed with staff in treatment team meeting.  Medications, treatment progress and treatment plan reviewed with patient.    Basilio Panda MD 05/23/24

## 2024-05-23 NOTE — SOCIAL WORK
11/6/20: CONSIDER STEROID BUT WILL AWAIT IOP RESPONSE TO PST OD TO DETERMINE SAFETY OF INJ IN OS. Cm met with Pt on the unit. Cm introduced self to Pt and Cm's role to support Pt upon his transition out of the hospital. Pt understood. Cm checked in with Pt briefly. Pt asked questions regarding placement. Cm encouraged Pt that referrals were in the process of being made for Pt. Pt understood. Cm asked Pt if there were any other questions that Pt had for Cm. Pt denied. Pt continued to get his vitals taken.

## 2024-05-23 NOTE — PLAN OF CARE
Problem: Ineffective Coping  Goal: Cooperates with admission process  Description: Interventions:   - Complete admission process  Outcome: Progressing  Goal: Identifies ineffective coping skills  Outcome: Progressing  Goal: Identifies healthy coping skills  Outcome: Progressing  Goal: Demonstrates healthy coping skills  Outcome: Progressing  Goal: Participates in unit activities  Description: Interventions:  - Provide therapeutic environment   - Provide required programming   - Redirect inappropriate behaviors   Outcome: Progressing     Problem: Risk for Self Injury/Neglect  Goal: Treatment Goal: Remain safe during length of stay, learn and adopt new coping skills, and be free of self-injurious ideation, impulses and acts at the time of discharge  Outcome: Progressing     Problem: Depression  Goal: Treatment Goal: Demonstrate behavioral control of depressive symptoms, verbalize feelings of improved mood/affect, and adopt new coping skills prior to discharge  Outcome: Progressing  Goal: Verbalize thoughts and feelings  Description: Interventions:  - Assess and re-assess patient's level of risk   - Engage patient in 1:1 interactions, daily, for a minimum of 15 minutes   - Encourage patient to express feelings, fears, frustrations, hopes   Outcome: Progressing  Goal: Refrain from harming self  Description: Interventions:  - Monitor patient closely, per order   - Supervise medication ingestion, monitor effects and side effects   Outcome: Progressing  Goal: Refrain from isolation  Description: Interventions:  - Develop a trusting relationship   - Encourage socialization   Outcome: Progressing  Goal: Refrain from self-neglect  Outcome: Progressing  Goal: Attend and participate in unit activities, including therapeutic, recreational, and educational groups  Description: Interventions:  - Provide therapeutic and educational activities daily, encourage attendance and participation, and document same in the medical record    Outcome: Progressing  Goal: Complete daily ADLs, including personal hygiene independently, as able  Description: Interventions:  - Observe, teach, and assist patient with ADLS  -  Monitor and promote a balance of rest/activity, with adequate nutrition and elimination   Outcome: Progressing     Problem: Anxiety  Goal: Anxiety is at manageable level  Description: Interventions:  - Assess and monitor patient's anxiety level.   - Monitor for signs and symptoms (heart palpitations, chest pain, shortness of breath, headaches, nausea, feeling jumpy, restlessness, irritable, apprehensive).   - Collaborate with interdisciplinary team and initiate plan and interventions as ordered.  - [place] patient to unit/surroundings  - Explain treatment plan  - Encourage participation in care  - Encourage verbalization of concerns/fears  - Identify coping mechanisms  - Assist in developing anxiety-reducing skills  - Administer/offer alternative therapies  - Limit or eliminate stimulants  Outcome: Progressing

## 2024-05-24 PROCEDURE — 99232 SBSQ HOSP IP/OBS MODERATE 35: CPT | Performed by: STUDENT IN AN ORGANIZED HEALTH CARE EDUCATION/TRAINING PROGRAM

## 2024-05-24 RX ADMIN — SERTRALINE HYDROCHLORIDE 50 MG: 50 TABLET ORAL at 08:16

## 2024-05-24 NOTE — NURSING NOTE
Patient pleasant on approach, denies all psychiatric symptoms at this time. Patient isolative to self visible on unit, observed watching television in patient lounge compliant with medication and unit routine

## 2024-05-24 NOTE — PROGRESS NOTES
05/24/24 0913   Team Meeting   Meeting Type Daily Rounds   Team Members Present   Team Members Present Physician;Nurse;   Physician Team Member Amarilys   Nursing Team Member Talita   Care Management Team Member Suhas   Patient/Family Present   Patient Present No   Patient's Family Present No     Pt med/meal compliant. Visible on the unit. Social with select peers. Discharge to be determined.

## 2024-05-24 NOTE — NURSING NOTE
Pt awake and alert, visible in the milieu, social with select peers. Pt denies any SI/HI/AVH at this time, reports his mood is better today. Endorses mild anxiety and depression. Pt compliant with scheduled zoloft, refused B12. Pt denies any unmet needs at this time. Q7 minute safety checks maintained.

## 2024-05-24 NOTE — PROGRESS NOTES
"Progress Note - Behavioral Health   Franco Roberson 39 y.o. male MRN: [id number]  Unit/Bed#: [facility] 352-02 Encounter: [id number]    Assessment & Plan   Principal Problem:    Unspecified depressive disorder (HCC)  Active Problems:    Medical clearance for psychiatric admission    Hyperlipidemia    Vitamin D deficiency    B12 deficiency    Hypothyroidism    Rule Out Autism spectrum disorder      Recommended Treatment:   Continue Zoloft 50 mg daily  Patient currently on 201 voluntary commitment    Continue with group therapy, milieu therapy and occupational therapy.    Continue frequent safety checks and vitals per unit protocol.  Case discussed with treatment team.  Risks, benefits and possible side effects of Medications: Risks, benefits, and possible side effects of medications have been explained to the patient, who verbalizes understanding    ------------------------------------------------------------    Subjective: Per nursing report, Franco has been cooperative on the unit but is noncompliant with Lipitor, B12, vitamin D, and Synthroid.  Patient is compliant with psychotropic regimen of Zoloft 50 mg.  Today, Franco is consenting for safety on the unit. He reports no issues with sleep or appetite.  On approach, patient was disheveled with marginal hygiene.  He was calm and cooperative with interview, but somewhat guarded and with poor eye contact.  He is generally scant in speech and constricted in affect, however he became more talkative and bright and when talking about his interests such as strategy videogames.  He reports that he is doing well, and states that today his mood is \"good\" and the best it has been since he has been here.  Discussed with patient about his refusal of medication.  Offered education about the purpose of each individual med he refused and the risks of taking or refusing.  Patient states that he is never liked taking pills because of \"what he seen them due to people in the past\" and because it " "reminds him of a previous suicide attempt in which she overdosed on \"sleeping pills\".  Patient stated that he would consider what was discussed, however he would most likely would continue to refuse as he is \"set in his ways\".  He currently denies any side effects from his Zoloft.  Patient does report that he is fairly seclusive at baseline, however he has been getting out of his room while on the unit, talking to peers, and intermittently attending groups.  He denies any SI, HI, AVH.    Progress Toward Goals: slow improvement    Psychiatric Review of Systems:  Behavior over the last 24 hours: improved  Sleep: normal  Appetite: adequate  Medication side effects: none verbalized  ROS: Complete review of systems is negative except as noted above.    Vital signs in last 24 hours:  Temp:  [96.9 °F (36.1 °C)-97.2 °F (36.2 °C)] 96.9 °F (36.1 °C)  HR:  [71-76] 76  Resp:  [17] 17  BP: (137-142)/(79-85) 137/79    Mental Status Exam:  Appearance:  alert, appears stated age, casually dressed, marginal grooming/hygiene, and disheveled   Behavior:  calm, cooperative, guarded, sitting comfortably, pleasant, poor eye contact   Motor: no abnormal movements and normal gait and balance   Speech:  spontaneous, soft, scant, and coherent   Mood:  \" Good\"   Affect:  Constricted but bright at times   Thought Process:  Organized, logical, goal-directed, concrete   Thought Content: no verbalized delusions or overt paranoia   Perceptual disturbances: no reported hallucinations and does not appear to be responding to internal stimuli at this time   Risk Potential: No active or passive suicidal or homicidal ideation was verbalized during interview, Low potential for aggression based on previous behavior   Cognition: oriented to self and situation, appears to be of average intelligence, and cognition not formally tested   Insight:  Limited   Judgment: Limited     Current Medications:  Current Facility-Administered Medications   Medication Dose " Route Frequency Provider Last Rate    acetaminophen  650 mg Oral Q6H PRN Basilio Brown MD      acetaminophen  650 mg Oral Q4H PRN Basilio Brown MD      acetaminophen  975 mg Oral Q6H PRN Basilio Brown MD      aluminum-magnesium hydroxide-simethicone  30 mL Oral Q4H PRN Basilio Brown MD      Artificial Tears  1 drop Both Eyes Q3H PRN Basilio Brown MD      atorvastatin  10 mg Oral Daily With Dinner WALLY Baptiste      benztropine  1 mg Intramuscular Q4H PRN Max 6/day Basilio Brown MD      benztropine  1 mg Oral Q4H PRN Max 6/day Basilio Brown MD      cyanocobalamin  1,000 mcg Oral Daily WALLY Baptiste      cyanocobalamin  1,000 mcg Intramuscular Once WALLY Baptiste      hydrOXYzine HCL  50 mg Oral Q6H PRN Max 4/day Basilio Brown MD      Or    diphenhydrAMINE  50 mg Intramuscular Q6H PRN Basilio Brown MD      ergocalciferol  50,000 Units Oral Weekly WALLY Baptiste      hydrOXYzine HCL  100 mg Oral Q6H PRN Max 4/day Basilio Brown MD      Or    LORazepam  2 mg Intramuscular Q6H PRN Basilio Brown MD      hydrOXYzine HCL  25 mg Oral Q6H PRN Max 4/day Basilio Brown MD      levothyroxine  25 mcg Oral Early Morning WALLY Baptiste      OLANZapine  5 mg Oral Q4H PRN Max 3/day Basilio Brown MD      Or    OLANZapine  2.5 mg Intramuscular Q4H PRN Max 3/day Basilio Brown MD      OLANZapine  5 mg Oral Q3H PRN Max 3/day Basilio Brown MD      Or    OLANZapine  5 mg Intramuscular Q3H PRN Max 3/day Basilio Brown MD      OLANZapine  2.5 mg Oral Q4H PRN Max 6/day Basilio Brown MD      polyethylene glycol  17 g Oral Daily PRN Basilio Brown MD      propranolol  10 mg Oral Q8H PRN Basilio Brown MD      senna-docusate sodium  1 tablet Oral Daily PRN Basilio Brown MD      sertraline  50 mg Oral Daily Basilio Panda MD      traZODone  50 mg Oral HS  SHELBY Daniels MD         Behavioral Health Medications: all current active meds have been reviewed. Changes as in plan section above.    Laboratory results:  I have personally reviewed all pertinent laboratory/tests results.  No results found for this or any previous visit (from the past 48 hour(s)).     Bsailio Daniels MD

## 2024-05-24 NOTE — PLAN OF CARE
Problem: Ineffective Coping  Goal: Identifies ineffective coping skills  Outcome: Progressing  Goal: Identifies healthy coping skills  Outcome: Progressing  Goal: Demonstrates healthy coping skills  Outcome: Progressing     Problem: Risk for Self Injury/Neglect  Goal: Treatment Goal: Remain safe during length of stay, learn and adopt new coping skills, and be free of self-injurious ideation, impulses and acts at the time of discharge  Outcome: Progressing     Problem: Depression  Goal: Treatment Goal: Demonstrate behavioral control of depressive symptoms, verbalize feelings of improved mood/affect, and adopt new coping skills prior to discharge  Outcome: Progressing  Goal: Verbalize thoughts and feelings  Description: Interventions:  - Assess and re-assess patient's level of risk   - Engage patient in 1:1 interactions, daily, for a minimum of 15 minutes   - Encourage patient to express feelings, fears, frustrations, hopes   Outcome: Progressing  Goal: Refrain from harming self  Description: Interventions:  - Monitor patient closely, per order   - Supervise medication ingestion, monitor effects and side effects   Outcome: Progressing  Goal: Refrain from isolation  Description: Interventions:  - Develop a trusting relationship   - Encourage socialization   Outcome: Progressing  Goal: Complete daily ADLs, including personal hygiene independently, as able  Description: Interventions:  - Observe, teach, and assist patient with ADLS  -  Monitor and promote a balance of rest/activity, with adequate nutrition and elimination   Outcome: Progressing     Problem: Anxiety  Goal: Anxiety is at manageable level  Description: Interventions:  - Assess and monitor patient's anxiety level.   - Monitor for signs and symptoms (heart palpitations, chest pain, shortness of breath, headaches, nausea, feeling jumpy, restlessness, irritable, apprehensive).   - Collaborate with interdisciplinary team and initiate plan and interventions as  ordered.  - [place] patient to unit/surroundings  - Explain treatment plan  - Encourage participation in care  - Encourage verbalization of concerns/fears  - Identify coping mechanisms  - Assist in developing anxiety-reducing skills  - Administer/offer alternative therapies  - Limit or eliminate stimulants  Outcome: Progressing     Problem: Depression  Goal: Refrain from self-neglect  Outcome: Not Progressing     Problem: Ineffective Coping  Goal: Cooperates with admission process  Description: Interventions:   - Complete admission process  Outcome: Completed

## 2024-05-25 PROCEDURE — 99232 SBSQ HOSP IP/OBS MODERATE 35: CPT | Performed by: PSYCHIATRY & NEUROLOGY

## 2024-05-25 RX ADMIN — LEVOTHYROXINE SODIUM 25 MCG: 25 TABLET ORAL at 05:48

## 2024-05-25 RX ADMIN — SERTRALINE HYDROCHLORIDE 50 MG: 50 TABLET ORAL at 09:47

## 2024-05-25 NOTE — PROGRESS NOTES
Psychiatric Progress Note - Department of Behavioral Health   Franco Roberson 39 y.o. male MRN: [id number]  Unit/Bed#: [facility] 352-02 Encounter: [id number]    ASSESSMENT & PLAN     Diagnoses:   Principal Problem:    Unspecified depressive disorder (HCC)  Active Problems:    Medical clearance for psychiatric admission    Hyperlipidemia    Vitamin D deficiency    B12 deficiency    Hypothyroidism    Rule Out Autism spectrum disorder      Treatment Recommendations/Precautions:  Continue to promote patient participation in therapeutic milieu.  Continue medical management per medicine.  Continue previously prescribed psychotropic medication regimen; see below.  Continue behavioral health checks q.7 minutes.   Continue vitals per behavioral health unit protocol.  Discharge date per primary team; 201 commitment status.    SUBJECTIVE     Patient evaluated this a.m. for continuity of care. Patient was discussed at length with nursing and treatment team. Per nursing, patient remains calm, cooperative, intermittently interactive in the milieu, although inappropriately adherent to his medications aside from Zoloft without any acute adverse effects. No acute distress is noted throughout evaluation. Franco Roberson denies suicidal/homicidal ideation in addition to thoughts of self-injury, receptive to crisis planning provided by this writer, contacting for safety in the inpatient setting, admitting to an ability to appropriately confide in staff including this writer.  Patient denies dysphoric mood including depression and anxiety, denying any additional psychiatric complaints/concerns, acknowledging improvement since admission, appearing receptive to psychoeducation and supportive therapy provided by this writer pertaining to appropriate hands to his medications    PSYCHIATRIC REVIEW OF SYSTEMS     Behavior over the last 24 hours:  unchanged  Sleep: adequate  Appetite: adequate  Medication side effects: No    REVIEW OF SYSTEMS   Review of  systems: no complaints    OBJECTIVE     Vital Signs in Past 24 Hours:  Temp:  [97.4 °F (36.3 °C)-98.1 °F (36.7 °C)] 98.1 °F (36.7 °C)  HR:  [70-75] 70  Resp:  [16] 16  BP: (133-135)/(75-76) 133/76    Intake/Output in Past 24 hours:  No intake/output data recorded.  No intake/output data recorded.        Laboratory Results:  I have personally reviewed all pertinent laboratory/tests results.  Most Recent Labs:   Lab Results   Component Value Date    WBC 5.42 05/15/2024    RBC 5.05 05/15/2024    HGB 16.1 05/15/2024    HCT 49.4 (H) 05/15/2024     05/15/2024    RDW 12.6 05/15/2024    NEUTROABS 2.83 05/15/2024    SODIUM 142 05/15/2024    K 4.1 05/15/2024     05/15/2024    CO2 28 05/15/2024    BUN 13 05/15/2024    CREATININE 0.97 05/15/2024    GLUC 89 05/15/2024    GLUF 89 05/15/2024    CALCIUM 9.3 05/15/2024    AST 21 05/15/2024    ALT 19 05/15/2024    ALKPHOS 66 05/15/2024    TP 7.6 05/15/2024    ALB 4.3 05/15/2024    TBILI 0.69 05/15/2024    CHOLESTEROL 191 05/15/2024    HDL 44 05/15/2024    TRIG 116 05/15/2024    LDLCALC 124 (H) 05/15/2024    NONHDLC 147 05/15/2024    [id number] 5.162 (H) 05/15/2024    FREET4 0.60 (L) 05/15/2024       Behavioral Health Medications: all current active meds have been reviewed, continue current psychiatric medications, and current meds:   Current Facility-Administered Medications   Medication Dose Route Frequency    acetaminophen (TYLENOL) tablet 650 mg  650 mg Oral Q6H PRN    acetaminophen (TYLENOL) tablet 650 mg  650 mg Oral Q4H PRN    acetaminophen (TYLENOL) tablet 975 mg  975 mg Oral Q6H PRN    aluminum-magnesium hydroxide-simethicone (MAALOX) oral suspension 30 mL  30 mL Oral Q4H PRN    Artificial Tears ophthalmic solution 1 drop  1 drop Both Eyes Q3H PRN    atorvastatin (LIPITOR) tablet 10 mg  10 mg Oral Daily With Dinner    benztropine (COGENTIN) injection 1 mg  1 mg Intramuscular Q4H PRN Max 6/day    benztropine (COGENTIN) tablet 1 mg  1 mg Oral Q4H PRN Max 6/day     "cyanocobalamin (VITAMIN B-12) tablet 1,000 mcg  1,000 mcg Oral Daily    cyanocobalamin injection 1,000 mcg  1,000 mcg Intramuscular Once    hydrOXYzine HCL (ATARAX) tablet 50 mg  50 mg Oral Q6H PRN Max 4/day    Or    diphenhydrAMINE (BENADRYL) injection 50 mg  50 mg Intramuscular Q6H PRN    ergocalciferol (VITAMIN D2) capsule 50,000 Units  50,000 Units Oral Weekly    hydrOXYzine HCL (ATARAX) tablet 100 mg  100 mg Oral Q6H PRN Max 4/day    Or    LORazepam (ATIVAN) injection 2 mg  2 mg Intramuscular Q6H PRN    hydrOXYzine HCL (ATARAX) tablet 25 mg  25 mg Oral Q6H PRN Max 4/day    levothyroxine tablet 25 mcg  25 mcg Oral Early Morning    OLANZapine (ZyPREXA) tablet 5 mg  5 mg Oral Q4H PRN Max 3/day    Or    OLANZapine (ZyPREXA) IM injection 2.5 mg  2.5 mg Intramuscular Q4H PRN Max 3/day    OLANZapine (ZyPREXA) tablet 5 mg  5 mg Oral Q3H PRN Max 3/day    Or    OLANZapine (ZyPREXA) IM injection 5 mg  5 mg Intramuscular Q3H PRN Max 3/day    OLANZapine (ZyPREXA) tablet 2.5 mg  2.5 mg Oral Q4H PRN Max 6/day    polyethylene glycol (MIRALAX) packet 17 g  17 g Oral Daily PRN    propranolol (INDERAL) tablet 10 mg  10 mg Oral Q8H PRN    senna-docusate sodium (SENOKOT S) 8.6-50 mg per tablet 1 tablet  1 tablet Oral Daily PRN    sertraline (ZOLOFT) tablet 50 mg  50 mg Oral Daily    traZODone (DESYREL) tablet 50 mg  50 mg Oral HS PRN   .    Risks, benefits and possible side effects of Medications:   Risks, benefits, and possible side effects of medications explained to patient and patient verbalizes understanding.      Mental Status Evaluation:  Appearance:  age appropriate, casually dressed, disheveled, and marginal grooming/hygiene   Behavior:  psychomotor retardation calm and cooperative although somewhat superficial   Speech:  soft and scant   Mood:  euthymic; \"good\"   Affect:  constricted and mood-incongruent   Language sparse   Thought Process:  concrete   Thought Content:  no overt obsessions or delusions   Perceptual " Disturbances: None   Risk Potential: Suicidal Ideations none, Homicidal Ideations none, and Potential for Aggression No   Sensorium:  person, place, and time/date   Cognition:  recent and remote memory grossly intact   Consciousness:  alert and awake    Attention: attention span appeared shorter than expected for age   Insight:  limited   Judgment: limited   Intellect Not assessed   Gait/Station: Not assessed; in bed   Motor Activity: no abnormal movements     Memory: Short and long term memory  fair     Progress Toward Goals: unchanged, as evidenced by their participation (or lack thereof) in individual, social and therapeutic milieu in addition to adherence to their medication regimen.    Recommended Treatment:   See above for assessment and plan.    Inpatient Psychiatric Certification: Based upon physical, mental and social evaluations, I certify that inpatient psychiatric services are medically necessary for this patient for a duration of greater than 2 midnights for the treatment of Unspecified mood (affective) disorder (HCC) including psychotropic medication management, participation in the therapeutic milieu and referrals as indicated. Available alternative community resources do not meet the patient's mental health care needs. I further attest that an established written individualized plan of care has been implemented and is outlined in the patient's medical records.    Counseling/Coordination of Care    Total unit time spent today was greater than 15 minutes. Greater than 50% of total time was spent with the patient and/or patient's relatives and/or coordination of patient's care.     Patient's rights, confidentiality, exceptions to confidentiality, use of electronic medical record including appropriate staff access to medical record regarding behavioral health services and consent to treatment were reviewed.    Note Share:     This note was not shared with the patient due to reasonable likelihood of  causing patient harm     This note has been constructed using a voice recognition system.    There may be translation, syntax, or grammatical errors. If you have any questions, please contact the dictating provider.    Jordan Christopher Holter, DO 05/25/24

## 2024-05-25 NOTE — PLAN OF CARE
Problem: Ineffective Coping  Goal: Identifies ineffective coping skills  Outcome: Progressing  Goal: Identifies healthy coping skills  Outcome: Progressing  Goal: Demonstrates healthy coping skills  Outcome: Progressing     Problem: Risk for Self Injury/Neglect  Goal: Treatment Goal: Remain safe during length of stay, learn and adopt new coping skills, and be free of self-injurious ideation, impulses and acts at the time of discharge  Outcome: Progressing     Problem: Depression  Goal: Treatment Goal: Demonstrate behavioral control of depressive symptoms, verbalize feelings of improved mood/affect, and adopt new coping skills prior to discharge  Outcome: Progressing  Goal: Verbalize thoughts and feelings  Description: Interventions:  - Assess and re-assess patient's level of risk   - Engage patient in 1:1 interactions, daily, for a minimum of 15 minutes   - Encourage patient to express feelings, fears, frustrations, hopes   Outcome: Progressing  Goal: Refrain from harming self  Description: Interventions:  - Monitor patient closely, per order   - Supervise medication ingestion, monitor effects and side effects   Outcome: Progressing  Goal: Refrain from isolation  Description: Interventions:  - Develop a trusting relationship   - Encourage socialization   Outcome: Progressing  Goal: Refrain from self-neglect  Outcome: Progressing  Goal: Attend and participate in unit activities, including therapeutic, recreational, and educational groups  Description: Interventions:  - Provide therapeutic and educational activities daily, encourage attendance and participation, and document same in the medical record   Outcome: Progressing  Goal: Complete daily ADLs, including personal hygiene independently, as able  Description: Interventions:  - Observe, teach, and assist patient with ADLS  -  Monitor and promote a balance of rest/activity, with adequate nutrition and elimination   Outcome: Progressing     Problem: Anxiety  Goal:  Anxiety is at manageable level  Description: Interventions:  - Assess and monitor patient's anxiety level.   - Monitor for signs and symptoms (heart palpitations, chest pain, shortness of breath, headaches, nausea, feeling jumpy, restlessness, irritable, apprehensive).   - Collaborate with interdisciplinary team and initiate plan and interventions as ordered.  - [place] patient to unit/surroundings  - Explain treatment plan  - Encourage participation in care  - Encourage verbalization of concerns/fears  - Identify coping mechanisms  - Assist in developing anxiety-reducing skills  - Administer/offer alternative therapies  - Limit or eliminate stimulants  Outcome: Progressing     Problem: Knowledge Deficit  Goal: Patient/family/caregiver demonstrates understanding of disease process, treatment plan, medications, and discharge instructions  Description: Complete learning assessment and assess knowledge base.  Interventions:  - Provide teaching at level of understanding  - Provide teaching via preferred learning methods  Outcome: Not Progressing

## 2024-05-25 NOTE — NURSING NOTE
Pt visible on unit intermittently throughout evening. Pt is selectively social, guarded in conversation with nurse but cooperative. Pt denies SI/HI/AH/VH and current unmet needs.

## 2024-05-25 NOTE — NURSING NOTE
Patient has been seclusive to his room.  He appears guarded and scant during conversation.  Patient will only take Zolft.  He denies SI/HI and A/V hallucinations.

## 2024-05-26 PROCEDURE — 99232 SBSQ HOSP IP/OBS MODERATE 35: CPT | Performed by: PSYCHIATRY & NEUROLOGY

## 2024-05-26 RX ADMIN — LEVOTHYROXINE SODIUM 25 MCG: 25 TABLET ORAL at 06:52

## 2024-05-26 RX ADMIN — SERTRALINE HYDROCHLORIDE 50 MG: 50 TABLET ORAL at 08:23

## 2024-05-26 NOTE — NURSING NOTE
Patient remained visible on the unit throughout the evening. Pleasant and cooperative with routine. Denied any unmet needs.

## 2024-05-26 NOTE — PROGRESS NOTES
Psychiatric Progress Note - Department of Behavioral Health   Franco Roberson 39 y.o. male MRN: [id number]  Unit/Bed#: [facility] 352-02 Encounter: [id number]    ASSESSMENT & PLAN     Diagnoses:   Principal Problem:    Unspecified depressive disorder (HCC)  Active Problems:    Medical clearance for psychiatric admission    Hyperlipidemia    Vitamin D deficiency    B12 deficiency    Hypothyroidism    Rule Out Autism spectrum disorder      Treatment Recommendations/Precautions:  Continue to promote patient participation in therapeutic milieu.  Continue medical management per medicine.  Continue previously prescribed psychotropic medication regimen; see below.  Continue behavioral health checks q.7 minutes.   Continue vitals per behavioral health unit protocol.  Discharge date per primary team; 201 commitment status.    SUBJECTIVE     Patient evaluated this a.m. for continuity of care. Patient was discussed at length with nursing and treatment team. Per nursing, patient remains calm, cooperative, although isolative at times in the milieu, adherent to Zoloft only in the absence of any acute adverse effects. No acute distress is noted throughout evaluation. Franco Roberson denies suicidal/homicidal ideation in addition to thoughts of self-injury, receptive to crisis planning provided by this writer, contacting for safety in the inpatient setting, admitting to an ability to appropriately confide in staff including this writer. Patient appears somewhat scant and sparse in interaction, superficial, denying any/all psychiatric complaints/concerns, appearing receptive to psychoeducation and supportive therapy provided by this writer pertaining to appropriate adherence to his medication regimen.     PSYCHIATRIC REVIEW OF SYSTEMS     Behavior over the last 24 hours:  unchanged  Sleep: adequate  Appetite: adequate  Medication side effects: No    REVIEW OF SYSTEMS   Review of systems: no complaints    OBJECTIVE     Vital Signs in Past 24  "Hours:  Temp:  [97.1 °F (36.2 °C)-97.2 °F (36.2 °C)] 97.1 °F (36.2 °C)  HR:  [62-70] 62  Resp:  [16] 16  BP: (128-132)/(79-86) 128/86    Intake/Output in Past 24 hours:  No intake/output data recorded.  No intake/output data recorded.        Laboratory Results:  I have personally reviewed all pertinent laboratory/tests results.  Most Recent Labs:   Lab Results   Component Value Date    WBC 5.42 05/15/2024    RBC 5.05 05/15/2024    HGB 16.1 05/15/2024    HCT 49.4 (H) 05/15/2024     05/15/2024    RDW 12.6 05/15/2024    NEUTROABS 2.83 05/15/2024    SODIUM 142 05/15/2024    K 4.1 05/15/2024     05/15/2024    CO2 28 05/15/2024    BUN 13 05/15/2024    CREATININE 0.97 05/15/2024    GLUC 89 05/15/2024    GLUF 89 05/15/2024    CALCIUM 9.3 05/15/2024    AST 21 05/15/2024    ALT 19 05/15/2024    ALKPHOS 66 05/15/2024    TP 7.6 05/15/2024    ALB 4.3 05/15/2024    TBILI 0.69 05/15/2024    CHOLESTEROL 191 05/15/2024    HDL 44 05/15/2024    TRIG 116 05/15/2024    LDLCALC 124 (H) 05/15/2024    NONHDLC 147 05/15/2024    [id number] 5.162 (H) 05/15/2024    FREET4 0.60 (L) 05/15/2024     Labs in last 72 hours: No results for input(s): \"WBC\", \"RBC\", \"HGB\", \"HCT\", \"PLT\", \"RDW\", \"TOTANEUTABS\", \"NEUTROABS\", \"SODIUM\", \"K\", \"CL\", \"CO2\", \"BUN\", \"CREATININE\", \"GLUC\", \"GLUF\", \"CALCIUM\", \"AST\", \"ALT\", \"ALKPHOS\", \"TP\", \"ALB\", \"TBILI\", \"CHOLESTEROL\", \"HDL\", \"TRIG\", \"LDLCALC\", \"VALPROICTOT\", \"CARBAMAZEPIN\", \"LITHIUM\", \"AMMONIA\", \"[id number]\", \"FREET4\", \"T3FREE\", \"PREGTESTUR\", \"PREGSERUM\", \"HCG\", \"HCGQUANT\", \"RPR\" in the last 72 hours.  Admission Labs:   Admission on 05/14/2024   Component Date Value    Sodium 05/15/2024 142     Potassium 05/15/2024 4.1     Chloride 05/15/2024 106     CO2 05/15/2024 28     ANION GAP 05/15/2024 8     BUN 05/15/2024 13     Creatinine 05/15/2024 0.97     Glucose 05/15/2024 89     Glucose, Fasting 05/15/2024 89     Calcium 05/15/2024 9.3     AST 05/15/2024 21     ALT 05/15/2024 19     Alkaline " Phosphatase 05/15/2024 66     Total Protein 05/15/2024 7.6     Albumin 05/15/2024 4.3     Total Bilirubin 05/15/2024 0.69     eGFR 05/15/2024 97     WBC 05/15/2024 5.42     RBC 05/15/2024 5.05     Hemoglobin 05/15/2024 16.1     Hematocrit 05/15/2024 49.4 (H)     MCV 05/15/2024 98     MCH 05/15/2024 31.9     MCHC 05/15/2024 32.6     RDW 05/15/2024 12.6     MPV 05/15/2024 9.2     Platelets 05/15/2024 268     nRBC 05/15/2024 0     Segmented % 05/15/2024 53     Immature Grans % 05/15/2024 0     Lymphocytes % 05/15/2024 38     Monocytes % 05/15/2024 7     Eosinophils Relative 05/15/2024 2     Basophils Relative 05/15/2024 0     Absolute Neutrophils 05/15/2024 2.83     Absolute Immature Grans 05/15/2024 0.01     Absolute Lymphocytes 05/15/2024 2.06     Absolute Monocytes 05/15/2024 0.40     Eosinophils Absolute 05/15/2024 0.10     Basophils Absolute 05/15/2024 0.02     TSH 3RD GENERATON 05/15/2024 5.162 (H)     Vitamin B-12 05/15/2024 168 (L)     Folate 05/15/2024 >22.3     Vit D, 25-Hydroxy 05/15/2024 11.6 (L)     Cholesterol 05/15/2024 191     Triglycerides 05/15/2024 116     HDL, Direct 05/15/2024 44     LDL Calculated 05/15/2024 124 (H)     Non-HDL-Chol (CHOL-HDL) 05/15/2024 147     Syphilis Total Antibody 05/15/2024 Non-reactive     Free T4 05/15/2024 0.60 (L)     Ventricular Rate 05/15/2024 66     Atrial Rate 05/15/2024 66     UT Interval 05/15/2024 146     QRSD Interval 05/15/2024 70     QT Interval 05/15/2024 402     QTC Interval 05/15/2024 421     P Axis 05/15/2024 39     QRS Axis 05/15/2024 48     T Wave Axis 05/15/2024 41     Ventricular Rate 05/15/2024 61     Atrial Rate 05/15/2024 61     UT Interval 05/15/2024 188     QRSD Interval 05/15/2024 92     QT Interval 05/15/2024 426     QTC Interval 05/15/2024 428     P Axis 05/15/2024 56     QRS Axis 05/15/2024 50     T Wave Axis 05/15/2024 46        Behavioral Health Medications: all current active meds have been reviewed, continue current psychiatric medications,  and current meds:   Current Facility-Administered Medications   Medication Dose Route Frequency    acetaminophen (TYLENOL) tablet 650 mg  650 mg Oral Q6H PRN    acetaminophen (TYLENOL) tablet 650 mg  650 mg Oral Q4H PRN    acetaminophen (TYLENOL) tablet 975 mg  975 mg Oral Q6H PRN    aluminum-magnesium hydroxide-simethicone (MAALOX) oral suspension 30 mL  30 mL Oral Q4H PRN    Artificial Tears ophthalmic solution 1 drop  1 drop Both Eyes Q3H PRN    atorvastatin (LIPITOR) tablet 10 mg  10 mg Oral Daily With Dinner    benztropine (COGENTIN) injection 1 mg  1 mg Intramuscular Q4H PRN Max 6/day    benztropine (COGENTIN) tablet 1 mg  1 mg Oral Q4H PRN Max 6/day    cyanocobalamin (VITAMIN B-12) tablet 1,000 mcg  1,000 mcg Oral Daily    cyanocobalamin injection 1,000 mcg  1,000 mcg Intramuscular Once    hydrOXYzine HCL (ATARAX) tablet 50 mg  50 mg Oral Q6H PRN Max 4/day    Or    diphenhydrAMINE (BENADRYL) injection 50 mg  50 mg Intramuscular Q6H PRN    ergocalciferol (VITAMIN D2) capsule 50,000 Units  50,000 Units Oral Weekly    hydrOXYzine HCL (ATARAX) tablet 100 mg  100 mg Oral Q6H PRN Max 4/day    Or    LORazepam (ATIVAN) injection 2 mg  2 mg Intramuscular Q6H PRN    hydrOXYzine HCL (ATARAX) tablet 25 mg  25 mg Oral Q6H PRN Max 4/day    levothyroxine tablet 25 mcg  25 mcg Oral Early Morning    OLANZapine (ZyPREXA) tablet 5 mg  5 mg Oral Q4H PRN Max 3/day    Or    OLANZapine (ZyPREXA) IM injection 2.5 mg  2.5 mg Intramuscular Q4H PRN Max 3/day    OLANZapine (ZyPREXA) tablet 5 mg  5 mg Oral Q3H PRN Max 3/day    Or    OLANZapine (ZyPREXA) IM injection 5 mg  5 mg Intramuscular Q3H PRN Max 3/day    OLANZapine (ZyPREXA) tablet 2.5 mg  2.5 mg Oral Q4H PRN Max 6/day    polyethylene glycol (MIRALAX) packet 17 g  17 g Oral Daily PRN    propranolol (INDERAL) tablet 10 mg  10 mg Oral Q8H PRN    senna-docusate sodium (SENOKOT S) 8.6-50 mg per tablet 1 tablet  1 tablet Oral Daily PRN    sertraline (ZOLOFT) tablet 50 mg  50 mg Oral  Daily    traZODone (DESYREL) tablet 50 mg  50 mg Oral HS PRN   .    Risks, benefits and possible side effects of Medications:   Risks, benefits, and possible side effects of medications explained to patient and patient verbalizes understanding.      Mental Status Evaluation:  Appearance:  age appropriate, casually dressed, disheveled, and marginal grooming/hygiene   Behavior:  psychomotor retardation, calm, cooperative, superficial possessing intermittent eye contact   Speech:  soft and scant   Mood:  euthymic   Affect:  constricted and mood-incongruent   Language sparse   Thought Process:  concrete   Thought Content:  No overt obsessions or delusions   Perceptual Disturbances: None   Risk Potential: Suicidal Ideations none, Homicidal Ideations none, and Potential for Aggression No   Sensorium:  person, place, and time/date   Cognition:  recent and remote memory grossly intact   Consciousness:  alert and awake    Attention: attention span appeared shorter than expected for age   Insight:  limited   Judgment: limited   Intellect Not assessed   Gait/Station: normal gait/station and normal balance   Motor Activity: no abnormal movements     Memory: Short and long term memory  fair     Progress Toward Goals: unchanged, as evidenced by their participation (or lack thereof) in individual, social and therapeutic milieu in addition to adherence to their medication regimen.    Recommended Treatment:   See above for assessment and plan.    Inpatient Psychiatric Certification: Based upon physical, mental and social evaluations, I certify that inpatient psychiatric services are medically necessary for this patient for a duration of greater than 2 midnights for the treatment of Unspecified mood (affective) disorder (HCC) including psychotropic medication management, participation in the therapeutic milieu and referrals as indicated. Available alternative community resources do not meet the patient's mental health care needs. I  further attest that an established written individualized plan of care has been implemented and is outlined in the patient's medical records.    Counseling/Coordination of Care    Total unit time spent today was greater than 15 minutes. Greater than 50% of total time was spent with the patient and/or patient's relatives and/or coordination of patient's care.     Patient's rights, confidentiality, exceptions to confidentiality, use of electronic medical record including appropriate staff access to medical record regarding behavioral health services and consent to treatment were reviewed.    Note Share:     This note was not shared with the patient due to reasonable likelihood of causing patient harm     This note has been constructed using a voice recognition system.    There may be translation, syntax, or grammatical errors. If you have any questions, please contact the dictating provider.    Jordan Christopher Holter, DO 05/26/24

## 2024-05-26 NOTE — NURSING NOTE
Patient has been seclusive to self and periodically visible on the unit.  Patient makes his needs known, he is quiet, scant, and guarded during conversation.  Patient denies SI/HI and A/V hallucinations. Patient attended group, participated, and was appropriate.

## 2024-05-26 NOTE — PLAN OF CARE
Problem: Ineffective Coping  Goal: Identifies ineffective coping skills  Outcome: Progressing  Goal: Identifies healthy coping skills  Outcome: Progressing  Goal: Demonstrates healthy coping skills  Outcome: Progressing  Goal: Participates in unit activities  Description: Interventions:  - Provide therapeutic environment   - Provide required programming   - Redirect inappropriate behaviors   Outcome: Progressing     Problem: Risk for Self Injury/Neglect  Goal: Treatment Goal: Remain safe during length of stay, learn and adopt new coping skills, and be free of self-injurious ideation, impulses and acts at the time of discharge  Outcome: Progressing

## 2024-05-27 PROCEDURE — 99232 SBSQ HOSP IP/OBS MODERATE 35: CPT | Performed by: PSYCHIATRY & NEUROLOGY

## 2024-05-27 RX ADMIN — SERTRALINE HYDROCHLORIDE 50 MG: 50 TABLET ORAL at 08:57

## 2024-05-27 RX ADMIN — LEVOTHYROXINE SODIUM 25 MCG: 25 TABLET ORAL at 06:03

## 2024-05-27 NOTE — PROGRESS NOTES
Progress Note - Behavioral Health     Franco Roberson 39 y.o. male MRN: [id number]   Unit/Bed#: [facility] 352-02 Encounter: [id number]      Subjective: Patient chart reviewed and case discussed with the nurse.  The patient was seen in his room today.  He reports he is doing okay.  He rates his depression at 2 on a scale of 0-10 with 10 being the worst.  Patient denies any suicidal thoughts or any urges to hurt others.  Denies any anxiety.  Reports sleep has been okay.  Reports appetite has been good.  Denies any auditory or visual hallucination.  Did not endorse any paranoia or delusional ideation during the meeting.  Reports compliant with medication and denies any side effects.    As per the nurse the patient has been mostly seclusive to his room today.  He is guarded and scattered with consultation with the staff.  Had denied any SI or HI or any other mental health concerns.  He only has been taking Zoloft as per the nurse.      ROS: no complaints, all other systems are negative    Mental Status Evaluation:    Appearance:  casually dressed   Behavior:  calm   Speech:  normal volume, normal pitch   Mood:  improved   Affect:  blunted   Thought Process:  logical   Associations: intact associations   Thought Content:  no overt delusions   Perceptual Disturbances: no auditory hallucinations, no visual hallucinations   Risk Potential: Suicidal ideation - None  Homicidal ideation - None  Potential for aggression - No   Sensorium:  oriented to person, place, and time/date   Memory:  recent and remote memory grossly intact   Consciousness:  alert and awake   Attention: attention span and concentration are age appropriate   Insight:  fair   Judgment: fair   Gait/Station: in bed   Motor Activity: no abnormal movements     Vital signs in last 24 hours:    Temp:  [97.5 °F (36.4 °C)-97.6 °F (36.4 °C)] 97.6 °F (36.4 °C)  HR:  [65-71] 71  Resp:  [16] 16  BP: (125-130)/(76-85) 130/76    Laboratory results: I have personally reviewed all  pertinent laboratory/tests results.  Most Recent Labs:   Lab Results   Component Value Date    WBC 5.42 05/15/2024    RBC 5.05 05/15/2024    HGB 16.1 05/15/2024    HCT 49.4 (H) 05/15/2024     05/15/2024    RDW 12.6 05/15/2024    NEUTROABS 2.83 05/15/2024    SODIUM 142 05/15/2024    K 4.1 05/15/2024     05/15/2024    CO2 28 05/15/2024    BUN 13 05/15/2024    CREATININE 0.97 05/15/2024    GLUC 89 05/15/2024    GLUF 89 05/15/2024    CALCIUM 9.3 05/15/2024    AST 21 05/15/2024    ALT 19 05/15/2024    ALKPHOS 66 05/15/2024    TP 7.6 05/15/2024    ALB 4.3 05/15/2024    TBILI 0.69 05/15/2024    CHOLESTEROL 191 05/15/2024    HDL 44 05/15/2024    TRIG 116 05/15/2024    LDLCALC 124 (H) 05/15/2024    NONHDLC 147 05/15/2024    [id number] 5.162 (H) 05/15/2024    FREET4 0.60 (L) 05/15/2024       Progress Toward Goals: improving gradually    Assessment & Plan the patient denies any concerns today.  The plan is to continue with the current medication with no changes.  We will continue to monitor him for his mood, behavior, safety, sleep and response to medication while he is here on the unit.  Principal Problem:    Unspecified depressive disorder (HCC)  Active Problems:    Medical clearance for psychiatric admission    Hyperlipidemia    Vitamin D deficiency    B12 deficiency    Hypothyroidism    Rule Out Autism spectrum disorder    Recommended Treatment:     Planned medication and treatment changes:    All current active medications have been reviewed  Encourage group therapy, milieu therapy and occupational therapy  Behavioral Health checks every 7 minutes  Continue current medications:  Current Facility-Administered Medications   Medication Dose Route Frequency Provider Last Rate    acetaminophen  650 mg Oral Q6H PRN Basilio Brown MD      acetaminophen  650 mg Oral Q4H PRN Basilio Brown MD      acetaminophen  975 mg Oral Q6H PRN Basilio Brown MD      aluminum-magnesium hydroxide-simethicone  30 mL  Oral Q4H PRN Basilio Brown MD      Artificial Tears  1 drop Both Eyes Q3H PRN Basilio Brown MD      atorvastatin  10 mg Oral Daily With Dinner WALLY Baptiste      benztropine  1 mg Intramuscular Q4H PRN Max 6/day Basilio Brown MD      benztropine  1 mg Oral Q4H PRN Max 6/day Basilio Brown MD      cyanocobalamin  1,000 mcg Oral Daily Laura RomeWALLY Jacobson      cyanocobalamin  1,000 mcg Intramuscular Once Laura RomeWALLY Jacobson      hydrOXYzine HCL  50 mg Oral Q6H PRN Max 4/day Basilio Brown MD      Or    diphenhydrAMINE  50 mg Intramuscular Q6H PRN Basilio Brown MD      ergocalciferol  50,000 Units Oral Weekly Laura WALLY Olmos      hydrOXYzine HCL  100 mg Oral Q6H PRN Max 4/day Basilio Brown MD      Or    LORazepam  2 mg Intramuscular Q6H PRN Basilio Brown MD      hydrOXYzine HCL  25 mg Oral Q6H PRN Max 4/day Basilio Brown MD      levothyroxine  25 mcg Oral Early Morning WALLY Baptiste      OLANZapine  5 mg Oral Q4H PRN Max 3/day Basilio Brown MD      Or    OLANZapine  2.5 mg Intramuscular Q4H PRN Max 3/day Basilio Brown MD      OLANZapine  5 mg Oral Q3H PRN Max 3/day Basilio Brown MD      Or    OLANZapine  5 mg Intramuscular Q3H PRN Max 3/day Basilio Brown MD      OLANZapine  2.5 mg Oral Q4H PRN Max 6/day Basilio Brown MD      polyethylene glycol  17 g Oral Daily PRN Basilio Brown MD      propranolol  10 mg Oral Q8H PRN Basilio Brown MD      senna-docusate sodium  1 tablet Oral Daily PRN Basilio Brown MD      sertraline  50 mg Oral Daily Basilio Panda MD      traZODone  50 mg Oral HS PRN Basilio Brown MD         Risks / Benefits of Treatment:    Risks, benefits, and possible side effects of medications explained to patient and patient verbalizes understanding and agreement for treatment.    Counseling / Coordination of Care:    Patient's progress  discussed with staff in treatment team meeting.  Medications, treatment progress and treatment plan reviewed with patient.    Aniyah Gallagher MD 05/27/24

## 2024-05-27 NOTE — NURSING NOTE
Patient remained visible on the unit throughout the evening. Pleasant upon approach. Denied any unmet needs.

## 2024-05-27 NOTE — PLAN OF CARE
Problem: Ineffective Coping  Goal: Participates in unit activities  Description: Interventions:  - Provide therapeutic environment   - Provide required programming   - Redirect inappropriate behaviors   Outcome: Progressing     Problem: Depression  Goal: Verbalize thoughts and feelings  Description: Interventions:  - Assess and re-assess patient's level of risk   - Engage patient in 1:1 interactions, daily, for a minimum of 15 minutes   - Encourage patient to express feelings, fears, frustrations, hopes   Outcome: Progressing  Goal: Refrain from harming self  Description: Interventions:  - Monitor patient closely, per order   - Supervise medication ingestion, monitor effects and side effects   Outcome: Progressing  Goal: Attend and participate in unit activities, including therapeutic, recreational, and educational groups  Description: Interventions:  - Provide therapeutic and educational activities daily, encourage attendance and participation, and document same in the medical record   Outcome: Progressing     Problem: Anxiety  Goal: Anxiety is at manageable level  Description: Interventions:  - Assess and monitor patient's anxiety level.   - Monitor for signs and symptoms (heart palpitations, chest pain, shortness of breath, headaches, nausea, feeling jumpy, restlessness, irritable, apprehensive).   - Collaborate with interdisciplinary team and initiate plan and interventions as ordered.  - [place] patient to unit/surroundings  - Explain treatment plan  - Encourage participation in care  - Encourage verbalization of concerns/fears  - Identify coping mechanisms  - Assist in developing anxiety-reducing skills  - Administer/offer alternative therapies  - Limit or eliminate stimulants  Outcome: Progressing

## 2024-05-27 NOTE — NURSING NOTE
Patient has been seclusive to his room. He appears guarded and scant during conversation.  He denies SI/HI and A/V hallucinations. Patient will only take Zolft.

## 2024-05-28 PROCEDURE — 99232 SBSQ HOSP IP/OBS MODERATE 35: CPT | Performed by: PSYCHIATRY & NEUROLOGY

## 2024-05-28 RX ADMIN — LEVOTHYROXINE SODIUM 25 MCG: 25 TABLET ORAL at 06:00

## 2024-05-28 RX ADMIN — SERTRALINE HYDROCHLORIDE 50 MG: 50 TABLET ORAL at 08:12

## 2024-05-28 RX ADMIN — CYANOCOBALAMIN TAB 1000 MCG 1000 MCG: 1000 TAB at 08:12

## 2024-05-28 NOTE — NURSING NOTE
Guarded and scant during interaction. Seclusive to room but did come out for meals. Denies SI/HI, denies hallucinations. Appears depressed. Compliant with morning medications.

## 2024-05-28 NOTE — NURSING NOTE
"Pt spent most of shift withdrawn to his room asleep.  Pt currently awake, sating \"this is the usual time I'm awake, I'll probably be up for a while\"  pt was offered reading material but declined stating \"I have a good imagination\".  Pt compliant with medication and present for dinner.  Pt denies HI/SI/AVH, pleasant and cooperative.  No unmet needs have been reported at this time.   "

## 2024-05-28 NOTE — PROGRESS NOTES
"Progress Note - Behavioral Health     Franco Roberson 39 y.o. male MRN: [id number]   Unit/Bed#: U 352-02 Encounter: [id number]    Behavior over the last 24 hours: unchanged.     Franco was seen today in follow-up for continuation of care. Per staff, no acute events reported overnight. Franco states he has felt more\" down\" for the past 2 days. States he is fearful he may be kicked out of his potential group home and become homeless again. Reports parasuicidal thoughts if he was to become homeless in the future. Does report some ongoing anxiety surrounding group home placement, but does agree he needs further structure in a more supervised setting. Franco adamantly denies suicidal/homicidal ideation in addition to thoughts of self-injury. Franco presently is contacting for safety on the unit and feels comfortable to confide in staff if thoughts arise. At time of interview, no overt psychosis elicited. Franco has been complaint with medications and tolerating without any side effects.     Sleep: normal  Appetite: normal  Medication side effects: No   ROS: no complaints, all other systems are negative    Mental Status Evaluation:    Appearance:  age appropriate, casually dressed, marginal hygiene, greasy hair   Behavior:  cooperative, calm, guarded   Speech:  soft, decreased volume   Mood:  depressed   Affect:  constricted   Thought Process:  linear   Associations: concrete associations   Thought Content:  negative thoughts, ruminating thoughts   Perceptual Disturbances: no auditory hallucinations, no visual hallucinations, does not appear responding to internal stimuli   Risk Potential: Suicidal ideation -  parasuicidal statements regarding homelessness  Homicidal ideation - None at present  Potential for aggression - No   Sensorium:  oriented to person, place, and time/date   Memory:  recent and remote memory grossly intact   Consciousness:  alert and awake   Attention/Concentration: attention span and concentration are " age appropriate   Insight:  limited   Judgment: limited   Gait/Station: normal gait/station   Motor Activity: no abnormal movements     Vital signs in last 24 hours:    Temp:  [97.5 °F (36.4 °C)-97.6 °F (36.4 °C)] 97.5 °F (36.4 °C)  HR:  [63-71] 63  Resp:  [16] 16  BP: (128-130)/(76-77) 128/77    Laboratory results: I have personally reviewed all pertinent laboratory/tests results    Results from the past 24 hours: No results found for this or any previous visit (from the past 24 hour(s)).  Most Recent Labs:   Lab Results   Component Value Date    WBC 5.42 05/15/2024    RBC 5.05 05/15/2024    HGB 16.1 05/15/2024    HCT 49.4 (H) 05/15/2024     05/15/2024    RDW 12.6 05/15/2024    NEUTROABS 2.83 05/15/2024    SODIUM 142 05/15/2024    K 4.1 05/15/2024     05/15/2024    CO2 28 05/15/2024    BUN 13 05/15/2024    CREATININE 0.97 05/15/2024    GLUC 89 05/15/2024    CALCIUM 9.3 05/15/2024    AST 21 05/15/2024    ALT 19 05/15/2024    ALKPHOS 66 05/15/2024    TP 7.6 05/15/2024    ALB 4.3 05/15/2024    TBILI 0.69 05/15/2024    CHOLESTEROL 191 05/15/2024    HDL 44 05/15/2024    TRIG 116 05/15/2024    LDLCALC 124 (H) 05/15/2024    NONHDLC 147 05/15/2024    [id number] 5.162 (H) 05/15/2024    FREET4 0.60 (L) 05/15/2024    SYPHILISAB Non-reactive 05/15/2024       Progress Toward Goals: progressing    Assessment & Plan   Principal Problem:    Unspecified depressive disorder (HCC)  Active Problems:    Medical clearance for psychiatric admission    Hyperlipidemia    Vitamin D deficiency    B12 deficiency    Hypothyroidism    Rule Out Autism spectrum disorder      Recommended Treatment:     Planned medication and treatment changes:    All current active medications have been reviewed  Encourage group therapy, milieu therapy and occupational therapy  Behavioral Health checks every 7 minutes    Continue current medications - consider increase in Zoloft (he was refusing at present)  Most recent labs reviewed  D/C planning  ongoing - for group home placement    Current Facility-Administered Medications   Medication Dose Route Frequency Provider Last Rate    acetaminophen  650 mg Oral Q6H PRN Basilio Brown MD      acetaminophen  650 mg Oral Q4H PRN Basilio Brown MD      acetaminophen  975 mg Oral Q6H PRN Basilio Brown MD      aluminum-magnesium hydroxide-simethicone  30 mL Oral Q4H PRN Basilio Brown MD      Artificial Tears  1 drop Both Eyes Q3H PRN Basilio Brown MD      atorvastatin  10 mg Oral Daily With Dinner WALLY Baptiste      benztropine  1 mg Intramuscular Q4H PRN Max 6/day Basilio Brown MD      benztropine  1 mg Oral Q4H PRN Max 6/day Basilio Brown MD      cyanocobalamin  1,000 mcg Oral Daily WALLY Baptiste      cyanocobalamin  1,000 mcg Intramuscular Once WALLY Baptiste      hydrOXYzine HCL  50 mg Oral Q6H PRN Max 4/day Basilio Brown MD      Or    diphenhydrAMINE  50 mg Intramuscular Q6H PRN Basilio Brown MD      ergocalciferol  50,000 Units Oral Weekly WALLY Baptiste      hydrOXYzine HCL  100 mg Oral Q6H PRN Max 4/day Basilio Brown MD      Or    LORazepam  2 mg Intramuscular Q6H PRN Basilio Brown MD      hydrOXYzine HCL  25 mg Oral Q6H PRN Max 4/day Basilio Brown MD      levothyroxine  25 mcg Oral Early Morning WALLY Baptiste      OLANZapine  5 mg Oral Q4H PRN Max 3/day Basilio Brown MD      Or    OLANZapine  2.5 mg Intramuscular Q4H PRN Max 3/day Basilio Brown MD      OLANZapine  5 mg Oral Q3H PRN Max 3/day Basilio Brown MD      Or    OLANZapine  5 mg Intramuscular Q3H PRN Max 3/day Basilio Brown MD      OLANZapine  2.5 mg Oral Q4H PRN Max 6/day Basilio Brown MD      polyethylene glycol  17 g Oral Daily PRN Basilio Brown MD      propranolol  10 mg Oral Q8H PRN MD naresh Moreno-docusate sodium  1 tablet Oral Daily PRN Basilio Garcia  MD Karyn      sertraline  50 mg Oral Daily Basilio Panda MD      traZODone  50 mg Oral HS PRN Basilio Brown MD       Risks / Benefits of Treatment:    Risks, benefits, and possible side effects of medications explained to patient and patient verbalizes understanding and agreement for treatment.    Counseling / Coordination of Care:    Total floor / unit time spent today 20 minutes. Greater than 50% of total time was spent with the patient and / or family counseling and / or coordination of care. A description of counseling / coordination of care:  Patient's progress discussed with staff in treatment team meeting.  Medications, treatment progress and treatment plan reviewed with patient.    Elizabeth Pennington PA-C 05/28/24

## 2024-05-28 NOTE — PROGRESS NOTES
05/28/24 0938   Team Meeting   Meeting Type Daily Rounds   Team Members Present   Team Members Present Physician;Nurse;   Physician Team Member Lucero   [facility] Suhas   Patient/Family Present   Patient Present No   Patient's Family Present No     Pt med/meal compliant. Visible on the unit. Appeared guarded, scant in conversation. Seclusive to self. Did not require any PRN medications.

## 2024-05-29 PROCEDURE — 99232 SBSQ HOSP IP/OBS MODERATE 35: CPT | Performed by: STUDENT IN AN ORGANIZED HEALTH CARE EDUCATION/TRAINING PROGRAM

## 2024-05-29 RX ADMIN — SERTRALINE HYDROCHLORIDE 50 MG: 50 TABLET ORAL at 08:05

## 2024-05-29 RX ADMIN — LEVOTHYROXINE SODIUM 25 MCG: 25 TABLET ORAL at 06:47

## 2024-05-29 NOTE — NURSING NOTE
Pt in bed asleep until dinner for which he was present.  Pt visible on milieu following dinner.  Pt refused even LIPITOR.  Pt appears depressed, denies HI/SI/AVH.  Pt is dressed in personal attire.  No remarkable behaviors observed.

## 2024-05-29 NOTE — PROGRESS NOTES
Progress Note - Behavioral Health   Franco Roberson 39 y.o. male MRN: [id number]  Unit/Bed#: [facility] 352-02 Encounter: [id number]    Assessment & Plan   Principal Problem:    Unspecified depressive disorder (HCC)  Active Problems:    Medical clearance for psychiatric admission    Hyperlipidemia    Vitamin D deficiency    B12 deficiency    Hypothyroidism    Rule Out Autism spectrum disorder    Treatment Plan:  -Continue Sertraline 50mg Daily    Behavior over the last 24 hours:  unchanged  Sleep: poor due to roomate  Appetite: normal  Medication side effects: No  ROS: no complaints    Subjective: Patient endorsed that his sleep was rough but okay due to his roommate. Endorsed that his mood was up and down. Denies current SI but emphasizes that he may have it if discharged without a plan. States he fears getting kicked out of a group home and then getting back to square one. Discussed the nature of group homes and that they generally only refuse people if there are disruptions to the community. Discussed increasing medications but also resolved to discuss that possibility furhter tomorrow.     Mental Status Evaluation:  Appearance:  age appropriate   Behavior:  normal   Speech:  soft   Mood:  anxious, depressed, and dysthymic   Affect:  blunted   Thought Process:  normal   Associations: intact associations   Thought Content:  normal   Perceptual Disturbances: None   Risk Potential: Suicidal Ideations none  Homicidal Ideations none  Potential for Aggression No   Sensorium:  person, place, time/date, and situation   Memory:  recent and remote memory grossly intact   Consciousness:  alert and awake    Attention: attention span and concentration were age appropriate   Insight:  fair   Judgment: fair   Gait/Station: normal gait/station   Motor Activity: no abnormal movements     Progress Toward Goals: Unchanged    Recommended Treatment: Continue with group therapy, milieu therapy and occupational therapy.      Risks, benefits and  possible side effects of Medications:   Risks, benefits, and possible side effects of medications explained to patient and patient verbalizes understanding.   Risks of medications in pregnancy explained if female patient.  Patient verbalizes understanding and agrees to notify her doctor if she becomes pregnant. Patient does not verbalize understanding at this time and will require further explanation.      Medications: current meds:   Current Facility-Administered Medications   Medication Dose Route Frequency    acetaminophen (TYLENOL) tablet 650 mg  650 mg Oral Q6H PRN    acetaminophen (TYLENOL) tablet 650 mg  650 mg Oral Q4H PRN    acetaminophen (TYLENOL) tablet 975 mg  975 mg Oral Q6H PRN    aluminum-magnesium hydroxide-simethicone (MAALOX) oral suspension 30 mL  30 mL Oral Q4H PRN    Artificial Tears ophthalmic solution 1 drop  1 drop Both Eyes Q3H PRN    atorvastatin (LIPITOR) tablet 10 mg  10 mg Oral Daily With Dinner    benztropine (COGENTIN) injection 1 mg  1 mg Intramuscular Q4H PRN Max 6/day    benztropine (COGENTIN) tablet 1 mg  1 mg Oral Q4H PRN Max 6/day    cyanocobalamin (VITAMIN B-12) tablet 1,000 mcg  1,000 mcg Oral Daily    cyanocobalamin injection 1,000 mcg  1,000 mcg Intramuscular Once    hydrOXYzine HCL (ATARAX) tablet 50 mg  50 mg Oral Q6H PRN Max 4/day    Or    diphenhydrAMINE (BENADRYL) injection 50 mg  50 mg Intramuscular Q6H PRN    ergocalciferol (VITAMIN D2) capsule 50,000 Units  50,000 Units Oral Weekly    hydrOXYzine HCL (ATARAX) tablet 100 mg  100 mg Oral Q6H PRN Max 4/day    Or    LORazepam (ATIVAN) injection 2 mg  2 mg Intramuscular Q6H PRN    hydrOXYzine HCL (ATARAX) tablet 25 mg  25 mg Oral Q6H PRN Max 4/day    levothyroxine tablet 25 mcg  25 mcg Oral Early Morning    OLANZapine (ZyPREXA) tablet 5 mg  5 mg Oral Q4H PRN Max 3/day    Or    OLANZapine (ZyPREXA) IM injection 2.5 mg  2.5 mg Intramuscular Q4H PRN Max 3/day    OLANZapine (ZyPREXA) tablet 5 mg  5 mg Oral Q3H PRN Max 3/day     Or    OLANZapine (ZyPREXA) IM injection 5 mg  5 mg Intramuscular Q3H PRN Max 3/day    OLANZapine (ZyPREXA) tablet 2.5 mg  2.5 mg Oral Q4H PRN Max 6/day    polyethylene glycol (MIRALAX) packet 17 g  17 g Oral Daily PRN    propranolol (INDERAL) tablet 10 mg  10 mg Oral Q8H PRN    senna-docusate sodium (SENOKOT S) 8.6-50 mg per tablet 1 tablet  1 tablet Oral Daily PRN    sertraline (ZOLOFT) tablet 50 mg  50 mg Oral Daily    traZODone (DESYREL) tablet 50 mg  50 mg Oral HS PRN   .    Labs: I have personally reviewed all pertinent laboratory/tests results. Most Recent Labs:   Lab Results   Component Value Date    WBC 5.42 05/15/2024    RBC 5.05 05/15/2024    HGB 16.1 05/15/2024    HCT 49.4 (H) 05/15/2024     05/15/2024    RDW 12.6 05/15/2024    NEUTROABS 2.83 05/15/2024    SODIUM 142 05/15/2024    K 4.1 05/15/2024     05/15/2024    CO2 28 05/15/2024    BUN 13 05/15/2024    CREATININE 0.97 05/15/2024    GLUC 89 05/15/2024    GLUF 89 05/15/2024    CALCIUM 9.3 05/15/2024    AST 21 05/15/2024    ALT 19 05/15/2024    ALKPHOS 66 05/15/2024    TP 7.6 05/15/2024    ALB 4.3 05/15/2024    TBILI 0.69 05/15/2024    CHOLESTEROL 191 05/15/2024    HDL 44 05/15/2024    TRIG 116 05/15/2024    LDLCALC 124 (H) 05/15/2024    NONHDLC 147 05/15/2024    [id number] 5.162 (H) 05/15/2024    FREET4 0.60 (L) 05/15/2024

## 2024-05-29 NOTE — NURSING NOTE
Pt appears flat, visible at time. Calm and cooperative upon approach. Pt refused vitamins but cooperative with psych medication;meal compliant. Denies SI, HI, AH, and VH. Denies any needs at this time

## 2024-05-29 NOTE — PLAN OF CARE
Problem: Ineffective Coping  Goal: Identifies ineffective coping skills  Outcome: Progressing  Goal: Identifies healthy coping skills  Outcome: Progressing  Goal: Demonstrates healthy coping skills  Outcome: Progressing  Goal: Participates in unit activities  Description: Interventions:  - Provide therapeutic environment   - Provide required programming   - Redirect inappropriate behaviors   Outcome: Progressing  Note: Patient attended 50% of groups 5/28     Problem: Risk for Self Injury/Neglect  Goal: Treatment Goal: Remain safe during length of stay, learn and adopt new coping skills, and be free of self-injurious ideation, impulses and acts at the time of discharge  Outcome: Progressing     Problem: Depression  Goal: Treatment Goal: Demonstrate behavioral control of depressive symptoms, verbalize feelings of improved mood/affect, and adopt new coping skills prior to discharge  Outcome: Progressing  Goal: Verbalize thoughts and feelings  Description: Interventions:  - Assess and re-assess patient's level of risk   - Engage patient in 1:1 interactions, daily, for a minimum of 15 minutes   - Encourage patient to express feelings, fears, frustrations, hopes   Outcome: Progressing  Goal: Refrain from harming self  Description: Interventions:  - Monitor patient closely, per order   - Supervise medication ingestion, monitor effects and side effects   Outcome: Progressing  Goal: Refrain from isolation  Description: Interventions:  - Develop a trusting relationship   - Encourage socialization   Outcome: Progressing  Goal: Refrain from self-neglect  Outcome: Progressing  Goal: Attend and participate in unit activities, including therapeutic, recreational, and educational groups  Description: Interventions:  - Provide therapeutic and educational activities daily, encourage attendance and participation, and document same in the medical record   Outcome: Progressing  Goal: Complete daily ADLs, including personal hygiene  independently, as able  Description: Interventions:  - Observe, teach, and assist patient with ADLS  -  Monitor and promote a balance of rest/activity, with adequate nutrition and elimination   Outcome: Progressing     Problem: Anxiety  Goal: Anxiety is at manageable level  Description: Interventions:  - Assess and monitor patient's anxiety level.   - Monitor for signs and symptoms (heart palpitations, chest pain, shortness of breath, headaches, nausea, feeling jumpy, restlessness, irritable, apprehensive).   - Collaborate with interdisciplinary team and initiate plan and interventions as ordered.  - [place] patient to unit/surroundings  - Explain treatment plan  - Encourage participation in care  - Encourage verbalization of concerns/fears  - Identify coping mechanisms  - Assist in developing anxiety-reducing skills  - Administer/offer alternative therapies  - Limit or eliminate stimulants  Outcome: Progressing     Problem: DISCHARGE PLANNING - CARE MANAGEMENT  Goal: Discharge to post-acute care or home with appropriate resources  Description: INTERVENTIONS:  - Conduct assessment to determine patient/family and health care team treatment goals, and need for post-acute services based on payer coverage, community resources, and patient preferences, and barriers to discharge  - Address psychosocial, clinical, and financial barriers to discharge as identified in assessment in conjunction with the patient/family and health care team  - Arrange appropriate level of post-acute services according to patient’s   needs and preference and payer coverage in collaboration with the physician and health care team  - Communicate with and update the patient/family, physician, and health care team regarding progress on the discharge plan  - Arrange appropriate transportation to post-acute venues  Outcome: Progressing     Problem: Knowledge Deficit  Goal: Patient/family/caregiver demonstrates understanding of disease process,  treatment plan, medications, and discharge instructions  Description: Complete learning assessment and assess knowledge base.  Interventions:  - Provide teaching at level of understanding  - Provide teaching via preferred learning methods  Outcome: Progressing

## 2024-05-29 NOTE — PROGRESS NOTES
05/29/24 0933   Team Meeting   Meeting Type Daily Rounds   Team Members Present   Team Members Present Physician;Nurse;   Physician Team Member Lucero   [facility] Noa   Patient/Family Present   Patient Present No   Patient's Family Present No     Pt appears depressed. Pt visible on the unit. Pt is meal compliant. Pt discharge it to be determined.

## 2024-05-30 PROCEDURE — 99232 SBSQ HOSP IP/OBS MODERATE 35: CPT | Performed by: STUDENT IN AN ORGANIZED HEALTH CARE EDUCATION/TRAINING PROGRAM

## 2024-05-30 RX ADMIN — SERTRALINE HYDROCHLORIDE 50 MG: 50 TABLET ORAL at 08:23

## 2024-05-30 NOTE — NURSING NOTE
Pt is visible at times, isolative to self. Pleasant and cooperative upon approach. Psych medication and meal compliant. Deneis SI, HI, AH,and VH. Denies any needs at this time.

## 2024-05-30 NOTE — PROGRESS NOTES
05/30/24 0929   Team Meeting   Meeting Type Daily Rounds   Team Members Present   Team Members Present Physician;;Nurse   Physician Team Member Lucero   [facility] Noa   Patient/Family Present   Patient Present No   Patient's Family Present No     Pt is calm and compliant. Pt is refusing medical medications and Psych medication compliant. Pt is meal compliant. Pt's discharge is to be determined.

## 2024-05-30 NOTE — PROGRESS NOTES
Progress Note - Behavioral Health   Franco Roberson 39 y.o. male MRN: [id number]  Unit/Bed#: [facility] 352-02 Encounter: [id number]    Assessment & Plan   Principal Problem:    Unspecified depressive disorder (HCC)  Active Problems:    Medical clearance for psychiatric admission    Hyperlipidemia    Vitamin D deficiency    B12 deficiency    Hypothyroidism    Rule Out Autism spectrum disorder    Treatment Plan:  -Continue Sertraline 75mg Daily     Behavior over the last 24 hours:  unchanged  Sleep: normal  Appetite: normal  Medication side effects: No  ROS: no complaints    Subjective: Patient endorsing feeling Alright. Endorsed having a good sleep. Patient stated appetite was good. Discussed patient's thoughts about going up on Sertraline. Patient endorsed that he spoke about going up on Sertraline with he previous provider and was okay with trailing a higher dose. Discussed going up to 75mg Daily.     Mental Status Evaluation:  Appearance:  age appropriate   Behavior:  normal   Speech:  normal volume   Mood:  euthymic   Affect:  mood-congruent   Thought Process:  normal   Associations: intact associations   Thought Content:  normal   Perceptual Disturbances: None   Risk Potential: Suicidal Ideations none  Homicidal Ideations none  Potential for Aggression No   Sensorium:  person, place, time/date, and situation   Memory:  recent and remote memory grossly intact   Consciousness:  alert and awake    Attention: attention span and concentration were age appropriate   Insight:  fair   Judgment: fair   Gait/Station: normal gait/station   Motor Activity: no abnormal movements     Progress Toward Goals: Improving    Recommended Treatment: Continue with group therapy, milieu therapy and occupational therapy.      Risks, benefits and possible side effects of Medications:   Risks, benefits, and possible side effects of medications explained to patient and patient verbalizes understanding.      Medications: current meds:   Current  Facility-Administered Medications   Medication Dose Route Frequency    acetaminophen (TYLENOL) tablet 650 mg  650 mg Oral Q6H PRN    acetaminophen (TYLENOL) tablet 650 mg  650 mg Oral Q4H PRN    acetaminophen (TYLENOL) tablet 975 mg  975 mg Oral Q6H PRN    aluminum-magnesium hydroxide-simethicone (MAALOX) oral suspension 30 mL  30 mL Oral Q4H PRN    Artificial Tears ophthalmic solution 1 drop  1 drop Both Eyes Q3H PRN    atorvastatin (LIPITOR) tablet 10 mg  10 mg Oral Daily With Dinner    benztropine (COGENTIN) injection 1 mg  1 mg Intramuscular Q4H PRN Max 6/day    benztropine (COGENTIN) tablet 1 mg  1 mg Oral Q4H PRN Max 6/day    cyanocobalamin (VITAMIN B-12) tablet 1,000 mcg  1,000 mcg Oral Daily    cyanocobalamin injection 1,000 mcg  1,000 mcg Intramuscular Once    hydrOXYzine HCL (ATARAX) tablet 50 mg  50 mg Oral Q6H PRN Max 4/day    Or    diphenhydrAMINE (BENADRYL) injection 50 mg  50 mg Intramuscular Q6H PRN    ergocalciferol (VITAMIN D2) capsule 50,000 Units  50,000 Units Oral Weekly    hydrOXYzine HCL (ATARAX) tablet 100 mg  100 mg Oral Q6H PRN Max 4/day    Or    LORazepam (ATIVAN) injection 2 mg  2 mg Intramuscular Q6H PRN    hydrOXYzine HCL (ATARAX) tablet 25 mg  25 mg Oral Q6H PRN Max 4/day    levothyroxine tablet 25 mcg  25 mcg Oral Early Morning    OLANZapine (ZyPREXA) tablet 5 mg  5 mg Oral Q4H PRN Max 3/day    Or    OLANZapine (ZyPREXA) IM injection 2.5 mg  2.5 mg Intramuscular Q4H PRN Max 3/day    OLANZapine (ZyPREXA) tablet 5 mg  5 mg Oral Q3H PRN Max 3/day    Or    OLANZapine (ZyPREXA) IM injection 5 mg  5 mg Intramuscular Q3H PRN Max 3/day    OLANZapine (ZyPREXA) tablet 2.5 mg  2.5 mg Oral Q4H PRN Max 6/day    polyethylene glycol (MIRALAX) packet 17 g  17 g Oral Daily PRN    propranolol (INDERAL) tablet 10 mg  10 mg Oral Q8H PRN    senna-docusate sodium (SENOKOT S) 8.6-50 mg per tablet 1 tablet  1 tablet Oral Daily PRN    [START ON 5/31/2024] sertraline (ZOLOFT) tablet 75 mg  75 mg Oral Daily     traZODone (DESYREL) tablet 50 mg  50 mg Oral HS PRN   .    Labs: I have personally reviewed all pertinent laboratory/tests results. Most Recent Labs:   Lab Results   Component Value Date    WBC 5.42 05/15/2024    RBC 5.05 05/15/2024    HGB 16.1 05/15/2024    HCT 49.4 (H) 05/15/2024     05/15/2024    RDW 12.6 05/15/2024    NEUTROABS 2.83 05/15/2024    SODIUM 142 05/15/2024    K 4.1 05/15/2024     05/15/2024    CO2 28 05/15/2024    BUN 13 05/15/2024    CREATININE 0.97 05/15/2024    GLUC 89 05/15/2024    GLUF 89 05/15/2024    CALCIUM 9.3 05/15/2024    AST 21 05/15/2024    ALT 19 05/15/2024    ALKPHOS 66 05/15/2024    TP 7.6 05/15/2024    ALB 4.3 05/15/2024    TBILI 0.69 05/15/2024    CHOLESTEROL 191 05/15/2024    HDL 44 05/15/2024    TRIG 116 05/15/2024    LDLCALC 124 (H) 05/15/2024    NONHDLC 147 05/15/2024    [id number] 5.162 (H) 05/15/2024    FREET4 0.60 (L) 05/15/2024

## 2024-05-30 NOTE — PLAN OF CARE
Problem: Ineffective Coping  Goal: Identifies ineffective coping skills  Outcome: Progressing  Goal: Identifies healthy coping skills  Outcome: Progressing  Goal: Demonstrates healthy coping skills  Outcome: Progressing  Goal: Participates in unit activities  Description: Interventions:  - Provide therapeutic environment   - Provide required programming   - Redirect inappropriate behaviors   Outcome: Progressing     Problem: Risk for Self Injury/Neglect  Goal: Treatment Goal: Remain safe during length of stay, learn and adopt new coping skills, and be free of self-injurious ideation, impulses and acts at the time of discharge  Outcome: Progressing     Problem: Depression  Goal: Treatment Goal: Demonstrate behavioral control of depressive symptoms, verbalize feelings of improved mood/affect, and adopt new coping skills prior to discharge  Outcome: Progressing  Goal: Verbalize thoughts and feelings  Description: Interventions:  - Assess and re-assess patient's level of risk   - Engage patient in 1:1 interactions, daily, for a minimum of 15 minutes   - Encourage patient to express feelings, fears, frustrations, hopes   Outcome: Progressing  Goal: Refrain from harming self  Description: Interventions:  - Monitor patient closely, per order   - Supervise medication ingestion, monitor effects and side effects   Outcome: Progressing  Goal: Refrain from isolation  Description: Interventions:  - Develop a trusting relationship   - Encourage socialization   Outcome: Progressing  Goal: Refrain from self-neglect  Outcome: Progressing  Goal: Attend and participate in unit activities, including therapeutic, recreational, and educational groups  Description: Interventions:  - Provide therapeutic and educational activities daily, encourage attendance and participation, and document same in the medical record   Outcome: Progressing  Goal: Complete daily ADLs, including personal hygiene independently, as able  Description:  Interventions:  - Observe, teach, and assist patient with ADLS  -  Monitor and promote a balance of rest/activity, with adequate nutrition and elimination   Outcome: Progressing     Problem: Anxiety  Goal: Anxiety is at manageable level  Description: Interventions:  - Assess and monitor patient's anxiety level.   - Monitor for signs and symptoms (heart palpitations, chest pain, shortness of breath, headaches, nausea, feeling jumpy, restlessness, irritable, apprehensive).   - Collaborate with interdisciplinary team and initiate plan and interventions as ordered.  - [place] patient to unit/surroundings  - Explain treatment plan  - Encourage participation in care  - Encourage verbalization of concerns/fears  - Identify coping mechanisms  - Assist in developing anxiety-reducing skills  - Administer/offer alternative therapies  - Limit or eliminate stimulants  Outcome: Progressing     Problem: DISCHARGE PLANNING - CARE MANAGEMENT  Goal: Discharge to post-acute care or home with appropriate resources  Description: INTERVENTIONS:  - Conduct assessment to determine patient/family and health care team treatment goals, and need for post-acute services based on payer coverage, community resources, and patient preferences, and barriers to discharge  - Address psychosocial, clinical, and financial barriers to discharge as identified in assessment in conjunction with the patient/family and health care team  - Arrange appropriate level of post-acute services according to patient’s   needs and preference and payer coverage in collaboration with the physician and health care team  - Communicate with and update the patient/family, physician, and health care team regarding progress on the discharge plan  - Arrange appropriate transportation to post-acute venues  Outcome: Progressing     Problem: Knowledge Deficit  Goal: Patient/family/caregiver demonstrates understanding of disease process, treatment plan, medications, and discharge  instructions  Description: Complete learning assessment and assess knowledge base.  Interventions:  - Provide teaching at level of understanding  - Provide teaching via preferred learning methods  Outcome: Progressing

## 2024-05-30 NOTE — NURSING NOTE
Pt pleasant in approach, flat affect visible on unit isolative to self. Pt denies SI/HI/AVH, reports some depression. Pt compliant with unit routine. Denies any unmet needs.

## 2024-05-30 NOTE — NURSING NOTE
"Patient has been sitting in the patient lounge for some time and at times pacing in the hallway. He was asked if he thought medication might get him off to sleep and he declined. Rodrick he had taken Melatonin in the past and he found it to ineffective.He said he was quite sure his \"narural need for sleep would take over.\" He also indicated his roommate's pattern of being up and going in and out of the room was a hindrance but he was very clear that he wanted to stay in that room with that room mate. He went off to bed around 0130.   "

## 2024-05-31 PROCEDURE — 99232 SBSQ HOSP IP/OBS MODERATE 35: CPT

## 2024-05-31 RX ADMIN — SERTRALINE HYDROCHLORIDE 75 MG: 50 TABLET ORAL at 09:22

## 2024-05-31 RX ADMIN — LEVOTHYROXINE SODIUM 25 MCG: 25 TABLET ORAL at 06:44

## 2024-05-31 NOTE — PROGRESS NOTES
Progress Note - Behavioral Health   Franco Roberson 39 y.o. male MRN: [id number]  Unit/Bed#: -02 Encounter: [id number]      Assessment & Plan   Principal Problem:    Unspecified depressive disorder (HCC)  Active Problems:    Medical clearance for psychiatric admission    Hyperlipidemia    Vitamin D deficiency    B12 deficiency    Hypothyroidism    Rule Out Autism spectrum disorder      Subjective:  Patient was seen today for continuation of care, records reviewed and patient was discussed with the morning case review team.  Per staff, Franco has been visible on the unit and denying SI/HI/AH/VH.  He is compliant with meals and medications.      Franco was seen today for psychiatric follow-up.  On assessment today, Franco was seen resting in bed.  Franco was pleasant, calm and cooperative throughout the interview.  He reports that his mood has improved since admission but admits to some ongoing hopelessness about his living situation.  He denies any acute suicidal ideation but feels if he were discharged to the street he may feel suicidal.  He continues to endorse some anxiety and depression.  His Zoloft was increased to 75mg PO daily this morning and he would be agreeable to further adjustments if indicated in the the future.  He denied any side effects from this mornings increase.      Franco denies acute suicidal/self-harm ideation/intent/plan upon direct inquiry at this time.  Franco remains behaviorally appropriate, no agitation or aggression noted on exam or in report.  Franco also denies HI/AH/VH, and does not appear overtly manic nor does he present as internally preoccupied.  No overt delusions or paranoia are verbalized.  Franco remains adherent to his current psychotropic medication regimen and denies any side effects from medications, as well as none noted on exam.    Recommended Treatment: Treatment plan and medication changes discussed and per the attending physician the plan is:    1.Continue with  group therapy, milieu therapy and occupational therapy  2.Behavioral Health checks every 7 minutes  3.Continue frequent safety checks and vitals per unit protocol  4.Continue with SLIM medical management as indicated  5.Continue with current medication regimen: Zoloft increased to 75mg PO daily this morning.   6.Will review labs in the a.m.  7.Disposition Planning: Discharge planning and efforts remain ongoing    Vitals:  Vitals:    05/31/24 0752   BP: 131/70   Pulse: 61   Resp: 16   Temp: (!) 96.9 °F (36.1 °C)   SpO2: 96%       Laboratory Results:  I have personally reviewed all pertinent laboratory/tests results.  Most Recent Labs:   Lab Results   Component Value Date    WBC 5.42 05/15/2024    RBC 5.05 05/15/2024    HGB 16.1 05/15/2024    HCT 49.4 (H) 05/15/2024     05/15/2024    RDW 12.6 05/15/2024    NEUTROABS 2.83 05/15/2024    SODIUM 142 05/15/2024    K 4.1 05/15/2024     05/15/2024    CO2 28 05/15/2024    BUN 13 05/15/2024    CREATININE 0.97 05/15/2024    GLUC 89 05/15/2024    GLUF 89 05/15/2024    CALCIUM 9.3 05/15/2024    AST 21 05/15/2024    ALT 19 05/15/2024    ALKPHOS 66 05/15/2024    TP 7.6 05/15/2024    ALB 4.3 05/15/2024    TBILI 0.69 05/15/2024    CHOLESTEROL 191 05/15/2024    HDL 44 05/15/2024    TRIG 116 05/15/2024    LDLCALC 124 (H) 05/15/2024    NONHDLC 147 05/15/2024    [id number] 5.162 (H) 05/15/2024    FREET4 0.60 (L) 05/15/2024       Psychiatric Review of Systems:  Behavior over the last 24 hours:  unchanged.   Sleep: adequate  Appetite: adequate  Medication side effects:  denies  ROS: no complaints, denies shortness of breath or chest pain and all other systems are negative for acute changes    Mental Status Evaluation:    Appearance:  casually dressed, marginal hygiene   Behavior:  pleasant, cooperative, calm   Speech:  slow   Mood:  depressed   Affect:  constricted   Thought Process:  linear   Associations: intact associations   Thought Content:  no overt delusions, negative  thinking, ruminating thoughts   Perceptual Disturbances: denies auditory or visual hallucinations when asked, does not appear responding to internal stimuli   Risk Potential: Suicidal ideation - None at present, contracts for safety on the unit, would talk to staff if not feeling safe on the unit  Homicidal ideation - None  Potential for aggression - No   Sensorium:  oriented to person, place, time/date, and situation   Memory:  recent memory intact   Consciousness:  alert and awake   Attention/Concentration: attention span and concentration are age appropriate   Insight:  improving   Judgment: improving   Gait/Station: normal gait/station   Motor Activity: no abnormal movements     Progress Toward Goals:   Franco is progressing towards goals of inpatient psychiatric treatment by continued medication compliance and is attending therapeutic modalities on the milieu. However, the patient continues to require inpatient psychiatric hospitalization for continued medication management and titration to optimize symptom reduction, improve sleep hygiene, and demonstrate adequate self-care.    Risk of Harm to Self:   Nursing Suicide Risk Assessment Last 24 hours: C-SSRS Risk (Since Last Contact)  Calculated C-SSRS Risk Score (Since Last Contact): No Risk Indicated  Current Specific Risk Factors include: mental illness diagnosis  Protective Factors: no current suicidal ideation, ability to communicate with staff on the unit, able to contract for safety on the unit, taking medications as ordered on the unit, improved mood, improved depressive symptoms, improved anxiety symptoms  Based on today's assessment, Franco presents the following risk of harm to self: low    Risk of Harm to Others:  Nursing Homicide Risk Assessment: Violence Risk to Others: Denies within past 6 months  Current Specific Risk Factors include: diagnosis of mood disorder  Protective Factors: no current homicidal ideation, no current psychotic symptoms,  compliant with medications on the unit as ordered, compliant with unit milieu, follows staff redirection  Based on today's assessment, Franco presents the following risk of harm to others: low    The following interventions are recommended: behavioral checks every 7 minutes, continued hospitalization on locked unit        Behavioral Health Medications: all current active meds have been reviewed and continue current psychiatric medications.  Current Facility-Administered Medications   Medication Dose Route Frequency Provider Last Rate    acetaminophen  650 mg Oral Q6H PRN Basilio Brown MD      acetaminophen  650 mg Oral Q4H PRN Basilio Brown MD      acetaminophen  975 mg Oral Q6H PRN Basilio Brown MD      aluminum-magnesium hydroxide-simethicone  30 mL Oral Q4H PRN Basilio Brown MD      Artificial Tears  1 drop Both Eyes Q3H PRN Basilio Brown MD      atorvastatin  10 mg Oral Daily With Dinner WALLY Baptiste      benztropine  1 mg Intramuscular Q4H PRN Max 6/day Basilio Brown MD      benztropine  1 mg Oral Q4H PRN Max 6/day Basiilo Brown MD      cyanocobalamin  1,000 mcg Oral Daily WALLY Baptiste      cyanocobalamin  1,000 mcg Intramuscular Once WALLY Baptiste      hydrOXYzine HCL  50 mg Oral Q6H PRN Max 4/day Basilio Brown MD      Or    diphenhydrAMINE  50 mg Intramuscular Q6H PRN Basilio Brown MD      ergocalciferol  50,000 Units Oral Weekly WALLY Baptiste      hydrOXYzine HCL  100 mg Oral Q6H PRN Max 4/day Basilio Brown MD      Or    LORazepam  2 mg Intramuscular Q6H PRN Basilio Brown MD      hydrOXYzine HCL  25 mg Oral Q6H PRN Max 4/day Basilio Brown MD      levothyroxine  25 mcg Oral Early Morning WALLY aBptiste      OLANZapine  5 mg Oral Q4H PRN Max 3/day Basilio Brown MD      Or    OLANZapine  2.5 mg Intramuscular Q4H PRN Max 3/day Basilio Brown MD       OLANZapine  5 mg Oral Q3H PRN Max 3/day Basilio Brown MD      Or    OLANZapine  5 mg Intramuscular Q3H PRN Max 3/day Basilio Brown MD      OLANZapine  2.5 mg Oral Q4H PRN Max 6/day Basilio Brown MD      polyethylene glycol  17 g Oral Daily PRN Basilio Brown MD      propranolol  10 mg Oral Q8H PRN Basilio Brown MD      senna-docusate sodium  1 tablet Oral Daily PRN Basilio Brown MD      sertraline  75 mg Oral Daily Alden Johnson MD      traZODone  50 mg Oral HS PRN Basilio Brown MD         Risks / Benefits of Treatment:  Risks, benefits, and possible side effects of medications explained to patient and patient verbalizes understanding and agreement for treatment.    Counseling / Coordination of Care:  Patient's progress reviewed with nursing staff.  Medications, treatment progress and treatment plan reviewed with patient.  Supportive counseling provided to the patient.    Total floor/unit time spent today 25 minutes. Greater than 50% of total time was spent with the patient and / or family counseling and / or coordination of care. A description of the counseling / coordination of care: medication education, treatment plan, supportive therapy.

## 2024-05-31 NOTE — NURSING NOTE
Patient has been visible in the milieu all evening but is not interacting with others - he is very much on the periphery of the milieu.  He denies S.I.H.I.A/H V/H

## 2024-05-31 NOTE — PROGRESS NOTES
05/31/24 0919   Team Meeting   Meeting Type Daily Rounds   Team Members Present   Team Members Present Physician;Nurse;   Physician Team Member Lucero   [facility]   Patient/Family Present   Patient Present No   Patient's Family Present No     Visible, denying symptoms. Med/meal compliant. Discharge to be determined.

## 2024-05-31 NOTE — NURSING NOTE
Patient is periodically visible on the unit.  He is med/meal compliant, pleasant, and cooperative.  He denies SI/Hi and A/V hallucinations.

## 2024-05-31 NOTE — SOCIAL WORK
Cm met with Pt and discussed residential placement. Pt asked several questions and Cm was able to answer them. Pt understood. Cm completed referral for [facility] referral and faxed the referral. Cm provided Pt with a hand out with information on the program. Pt understood and requested to read it in his room.

## 2024-06-01 PROCEDURE — 99232 SBSQ HOSP IP/OBS MODERATE 35: CPT | Performed by: PSYCHIATRY & NEUROLOGY

## 2024-06-01 RX ADMIN — SERTRALINE HYDROCHLORIDE 75 MG: 50 TABLET ORAL at 09:45

## 2024-06-01 NOTE — NURSING NOTE
Patient has been resting in his room.  He is med/meal compliant, pleasant, and cooperative.  He denies SI/HI and A/V hallucinations.  Patient c/o back ache.  He was offered tylenol and refused.

## 2024-06-01 NOTE — NURSING NOTE
Patient is in and out of the community. Patient has flat affect, but will engage in conversation when approached. Patient denies all psych symptoms at this time. Patient is compliant with scheduled medications. Q7 observation maintained.

## 2024-06-01 NOTE — PROGRESS NOTES
"Progress Note - Behavioral Health   Franco Roberson 39 y.o. male MRN: [id number]  Unit/Bed#: [facility] 352-02 Encounter: [id number]      Subjective:  The patient was seen for continuing care and reviewed with nursing. Per nursing, patient has been taking Zoloft though has not been as compliant with his other medications.  Patient was calm and cooperative with interview, pleasant.  Reports that he feels \"well\" today and states that he feels that his \"mental state is improving\".  When asked if he has complaints patient states that he has a \"knot\" in his back.  He appeared to brighten at times during the interview. Patient denies current suicidal or homicidal ideation, plan, or intent.  Denies auditory or visual hallucinations.      Current Medications:  Current Facility-Administered Medications   Medication Dose Route Frequency Provider Last Rate    acetaminophen  650 mg Oral Q6H PRN Basilio Brown MD      acetaminophen  650 mg Oral Q4H PRN Basilio Brown MD      acetaminophen  975 mg Oral Q6H PRN Basilio Brown MD      aluminum-magnesium hydroxide-simethicone  30 mL Oral Q4H PRN Basilio Brown MD      Artificial Tears  1 drop Both Eyes Q3H PRN Basilio Brown MD      atorvastatin  10 mg Oral Daily With Dinner WALLY Baptiste      benztropine  1 mg Intramuscular Q4H PRN Max 6/day Basilio Brown MD      benztropine  1 mg Oral Q4H PRN Max 6/day Basilio Brown MD      cyanocobalamin  1,000 mcg Oral Daily WALLY Baptiste      cyanocobalamin  1,000 mcg Intramuscular Once WALLY Baptiste      hydrOXYzine HCL  50 mg Oral Q6H PRN Max 4/day Basilio Brown MD      Or    diphenhydrAMINE  50 mg Intramuscular Q6H PRN Basilio Brown MD      ergocalciferol  50,000 Units Oral Weekly WALLY Baptiste      hydrOXYzine HCL  100 mg Oral Q6H PRN Max 4/day Basilio Brown MD      Or    LORazepam  2 mg Intramuscular Q6H PRN Basilio Brown MD      " "hydrOXYzine HCL  25 mg Oral Q6H PRN Max 4/day Basilio Brown MD      levothyroxine  25 mcg Oral Early Morning WALLY Baptiste      OLANZapine  5 mg Oral Q4H PRN Max 3/day Basilio Brown MD      Or    OLANZapine  2.5 mg Intramuscular Q4H PRN Max 3/day Basilio Brown MD      OLANZapine  5 mg Oral Q3H PRN Max 3/day Basilio Brown MD      Or    OLANZapine  5 mg Intramuscular Q3H PRN Max 3/day Basilio Brown MD      OLANZapine  2.5 mg Oral Q4H PRN Max 6/day Basilio Brown MD      polyethylene glycol  17 g Oral Daily PRN Basilio Brown MD      propranolol  10 mg Oral Q8H PRN Basilio Brown MD      senna-docusate sodium  1 tablet Oral Daily PRN Basilio Brown MD      sertraline  75 mg Oral Daily Alden Johnson MD      traZODone  50 mg Oral HS PRN Basilio Brown MD           Vital signs in last 24 hours:  Temp:  [97 °F (36.1 °C)-97.8 °F (36.6 °C)] 97 °F (36.1 °C)  HR:  [66-80] 66  Resp:  [16] 16  BP: (123-131)/(74-80) 123/74    Laboratory results:  I have personally reviewed all pertinent laboratory/tests results.        Mental Status Evaluation:  Appearance:  appears stated age, overweight , and sitting upright in bed   Behavior:  calm and cooperative, infrequent eye contact   Speech:  soft and slowed   Mood:  \"Well\"   Affect:  Constricted and brightens at times   Thought Process:  organized   Thought Content:  no delusions elicited   Perceptual Disturbances: Denies auditory or visual hallucinations and Does not appear to be responding to internal stimuli   Risk Potential: Denies suicidal or homicidal ideation, plan, or intent   Sensorium:  person, place, and situation   Cognition:  appears grossly intact   Consciousness:  alert and awake   Attention: attention span and concentration were age appropriate   Insight:  moderate   Judgment: moderate   Gait/Station: not assessed, patient in bed   Motor Activity: no abnormal movements         Assessment & Plan "   Principal Problem:    Unspecified depressive disorder (HCC)  Active Problems:    Medical clearance for psychiatric admission    Hyperlipidemia    Vitamin D deficiency    B12 deficiency    Hypothyroidism    Rule Out Autism spectrum disorder        Recommended Treatment:   Continue to promote patient participation in therapeutic milieu.  Continue medical management per medicine.  Continue previously prescribed psychotropic medication regimen; see above.  Continue behavioral health checks q.7 minutes.   Continue vitals per behavioral health unit protocol.  Discharge date per primary team.      Risks, benefits and possible side effects of Medications:   No new medication changes recommended by me at this time      Adonis Correa,       This note has been constructed using a voice recognition system.    There may be translation, syntax,  or grammatical errors. If you have any questions, please contact the dictating provider.

## 2024-06-01 NOTE — PLAN OF CARE
Problem: Ineffective Coping  Goal: Identifies ineffective coping skills  Outcome: Progressing  Goal: Identifies healthy coping skills  Outcome: Progressing  Goal: Demonstrates healthy coping skills  Outcome: Progressing  Goal: Participates in unit activities  Description: Interventions:  - Provide therapeutic environment   - Provide required programming   - Redirect inappropriate behaviors   Outcome: Progressing     Problem: Risk for Self Injury/Neglect  Goal: Treatment Goal: Remain safe during length of stay, learn and adopt new coping skills, and be free of self-injurious ideation, impulses and acts at the time of discharge  Outcome: Progressing     Problem: Depression  Goal: Treatment Goal: Demonstrate behavioral control of depressive symptoms, verbalize feelings of improved mood/affect, and adopt new coping skills prior to discharge  Outcome: Progressing  Goal: Verbalize thoughts and feelings  Description: Interventions:  - Assess and re-assess patient's level of risk   - Engage patient in 1:1 interactions, daily, for a minimum of 15 minutes   - Encourage patient to express feelings, fears, frustrations, hopes   Outcome: Progressing  Goal: Refrain from harming self  Description: Interventions:  - Monitor patient closely, per order   - Supervise medication ingestion, monitor effects and side effects   Outcome: Progressing  Goal: Refrain from isolation  Description: Interventions:  - Develop a trusting relationship   - Encourage socialization   Outcome: Progressing  Goal: Refrain from self-neglect  Outcome: Progressing  Goal: Attend and participate in unit activities, including therapeutic, recreational, and educational groups  Description: Interventions:  - Provide therapeutic and educational activities daily, encourage attendance and participation, and document same in the medical record   Outcome: Progressing  Goal: Complete daily ADLs, including personal hygiene independently, as able  Description:  Interventions:  - Observe, teach, and assist patient with ADLS  -  Monitor and promote a balance of rest/activity, with adequate nutrition and elimination   Outcome: Progressing     Problem: Anxiety  Goal: Anxiety is at manageable level  Description: Interventions:  - Assess and monitor patient's anxiety level.   - Monitor for signs and symptoms (heart palpitations, chest pain, shortness of breath, headaches, nausea, feeling jumpy, restlessness, irritable, apprehensive).   - Collaborate with interdisciplinary team and initiate plan and interventions as ordered.  - [place] patient to unit/surroundings  - Explain treatment plan  - Encourage participation in care  - Encourage verbalization of concerns/fears  - Identify coping mechanisms  - Assist in developing anxiety-reducing skills  - Administer/offer alternative therapies  - Limit or eliminate stimulants  Outcome: Progressing     Problem: DISCHARGE PLANNING - CARE MANAGEMENT  Goal: Discharge to post-acute care or home with appropriate resources  Description: INTERVENTIONS:  - Conduct assessment to determine patient/family and health care team treatment goals, and need for post-acute services based on payer coverage, community resources, and patient preferences, and barriers to discharge  - Address psychosocial, clinical, and financial barriers to discharge as identified in assessment in conjunction with the patient/family and health care team  - Arrange appropriate level of post-acute services according to patient’s   needs and preference and payer coverage in collaboration with the physician and health care team  - Communicate with and update the patient/family, physician, and health care team regarding progress on the discharge plan  - Arrange appropriate transportation to post-acute venues  Outcome: Progressing     Problem: Knowledge Deficit  Goal: Patient/family/caregiver demonstrates understanding of disease process, treatment plan, medications, and discharge  instructions  Description: Complete learning assessment and assess knowledge base.  Interventions:  - Provide teaching at level of understanding  - Provide teaching via preferred learning methods  Outcome: Progressing

## 2024-06-02 PROCEDURE — 99232 SBSQ HOSP IP/OBS MODERATE 35: CPT | Performed by: PSYCHIATRY & NEUROLOGY

## 2024-06-02 RX ADMIN — LEVOTHYROXINE SODIUM 25 MCG: 25 TABLET ORAL at 06:48

## 2024-06-02 RX ADMIN — SERTRALINE HYDROCHLORIDE 75 MG: 50 TABLET ORAL at 09:16

## 2024-06-02 NOTE — NURSING NOTE
"Patient remained visible on the unit throughout the evening. Pleasant and cooperative with routine. Denied any unmet needs. No scheduled HS medications and no PRNs needed. Patient stated \"I slept a lot today, so I will be up most of night.\"  "

## 2024-06-02 NOTE — NURSING NOTE
Patient has been resting in bed, out for needs and vitals.  He is med/meal compliant, pleasant, and cooperative.  He denies SI/HI and A/V hallucinations.

## 2024-06-02 NOTE — PROGRESS NOTES
"Progress Note - Behavioral Health   Franco Roberson 39 y.o. male MRN: [id number]  Unit/Bed#: [facility] 352-02 Encounter: [id number]      Subjective:  The patient was seen for continuing care and reviewed with nursing. Per nursing, patient has been cooperative with care.  Patient was calm and cooperative with interview.  Reports feeling \"okay\" today and that he has been \"catching up on sleep\".  Reports that he has been getting into a routine.  Denies having complaints today. Patient denies current suicidal or homicidal ideation.  Denies adverse effects to medications.      Current Medications:  Current Facility-Administered Medications   Medication Dose Route Frequency Provider Last Rate    acetaminophen  650 mg Oral Q6H PRN Basilio Brown MD      acetaminophen  650 mg Oral Q4H PRN Basilio Brown MD      acetaminophen  975 mg Oral Q6H PRN Basilio Brown MD      aluminum-magnesium hydroxide-simethicone  30 mL Oral Q4H PRN Basilio Brown MD      Artificial Tears  1 drop Both Eyes Q3H PRN Basilio Brown MD      atorvastatin  10 mg Oral Daily With Dinner WALLY Baptiste      benztropine  1 mg Intramuscular Q4H PRN Max 6/day Basilio Brown MD      benztropine  1 mg Oral Q4H PRN Max 6/day Basilio Brown MD      cyanocobalamin  1,000 mcg Oral Daily WALLY Baptiste      cyanocobalamin  1,000 mcg Intramuscular Once WALLY Baptiste      hydrOXYzine HCL  50 mg Oral Q6H PRN Max 4/day Basilio Brown MD      Or    diphenhydrAMINE  50 mg Intramuscular Q6H PRN Basilio Brown MD      ergocalciferol  50,000 Units Oral Weekly WALLY Baptiste      hydrOXYzine HCL  100 mg Oral Q6H PRN Max 4/day Basilio Brown MD      Or    LORazepam  2 mg Intramuscular Q6H PRN Basilio Brown MD      hydrOXYzine HCL  25 mg Oral Q6H PRN Max 4/day Basilio Brown MD      levothyroxine  25 mcg Oral Early Morning WALLY Baptiste      OLANZapine  5 " "mg Oral Q4H PRN Max 3/day Basilio Brown MD      Or    OLANZapine  2.5 mg Intramuscular Q4H PRN Max 3/day Basilio Brown MD      OLANZapine  5 mg Oral Q3H PRN Max 3/day Basilio Brown MD      Or    OLANZapine  5 mg Intramuscular Q3H PRN Max 3/day Basliio Brown MD      OLANZapine  2.5 mg Oral Q4H PRN Max 6/day Basilio Brown MD      polyethylene glycol  17 g Oral Daily PRN Basilio Brown MD      propranolol  10 mg Oral Q8H PRN Basilio Brown MD      senna-docusate sodium  1 tablet Oral Daily PRN Basilio Brown MD      sertraline  75 mg Oral Daily Alden Johnson MD      traZODone  50 mg Oral HS PRN Basilio Brown MD           Vital signs in last 24 hours:  Temp:  [97 °F (36.1 °C)-98.6 °F (37 °C)] 97 °F (36.1 °C)  HR:  [60-80] 60  Resp:  [16] 16  BP: (128-142)/(83) 128/83    Laboratory results:  I have personally reviewed all pertinent laboratory/tests results.          Mental Status Evaluation:  Appearance:  appears stated age, overweight , and sitting upright in bed   Behavior:  calm, cooperative, and infrequent eye contact   Speech:  normal rate and soft   Mood:  \"Okay\"   Affect:  Constricted   Thought Process:  organized   Thought Content:  no delusions elicited   Perceptual Disturbances: No hallucinations verbalized.  Does not appear to be responding to internal stimuli   Risk Potential: Denies suicidal or homicidal ideation   Sensorium:  person, place, and situation   Cognition:  appears grossly intact   Consciousness:  alert and awake   Attention: attention span and concentration were age appropriate   Insight:  moderate   Judgment: moderate   Gait/Station: not assessed   Motor Activity: no abnormal movements         Assessment & Plan   Principal Problem:    Unspecified depressive disorder (HCC)  Active Problems:    Medical clearance for psychiatric admission    Hyperlipidemia    Vitamin D deficiency    B12 deficiency    Hypothyroidism    Rule Out Autism spectrum " disorder        Recommended Treatment:   Continue to promote patient participation in therapeutic milieu.  Continue medical management per medicine.  Continue previously prescribed psychotropic medication regimen; see above.  Continue behavioral health checks q.7 minutes.   Continue vitals per behavioral health unit protocol.  Discharge date per primary team.      Risks, benefits and possible side effects of Medications:   No medication changes recommended by me at this time      Adonis Correa,       This note has been constructed using a voice recognition system.    There may be translation, syntax,  or grammatical errors. If you have any questions, please contact the dictating provider.

## 2024-06-03 PROCEDURE — 99232 SBSQ HOSP IP/OBS MODERATE 35: CPT

## 2024-06-03 RX ORDER — SERTRALINE HYDROCHLORIDE 100 MG/1
100 TABLET, FILM COATED ORAL DAILY
Status: DISCONTINUED | OUTPATIENT
Start: 2024-06-04 | End: 2024-06-05

## 2024-06-03 RX ADMIN — SERTRALINE HYDROCHLORIDE 75 MG: 50 TABLET ORAL at 08:25

## 2024-06-03 RX ADMIN — LEVOTHYROXINE SODIUM 25 MCG: 25 TABLET ORAL at 06:43

## 2024-06-03 NOTE — NURSING NOTE
Pt pleasant and cooperative, calm, disheveled, attended group, reports that he intentionally picked a country and western song at group to intentionally get a rise out of one of the patients. Did not identify which patient. Encouraged client to cautious and to avoid intentionally trying aggravate someone as it is not known how they will respond, pt verbalized understanding of this warning. Pt denies si/hi, denies vh but reports told the physician that he did experience last night a cat meowing 3 times outside of his door. Denies anxiety or depression. Will conitnue to monitor.

## 2024-06-03 NOTE — NURSING NOTE
Patient visible on the unit throughout the evening. Pleasant and cooperative. Denied any unmet needs.

## 2024-06-03 NOTE — PLAN OF CARE
Pt intermittently attends group therapy. Pt does not like to participate much while in a group setting. Pt more transparent when conversations occur 1:1. Pt appears to remains with significant depression and is unable to identify any motivators or protective factors.

## 2024-06-03 NOTE — NURSING NOTE
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 Flat affect with poor eye contact. Will continue to monitor.

## 2024-06-03 NOTE — PROGRESS NOTES
Progress Note - Behavioral Health   Franco Roberson 39 y.o. male MRN: [id number]  Unit/Bed#: [facility] 352-02 Encounter: [id number]      Assessment & Plan   Principal Problem:    Unspecified depressive disorder (HCC)  Active Problems:    Medical clearance for psychiatric admission    Hyperlipidemia    Vitamin D deficiency    B12 deficiency    Hypothyroidism    Rule Out Autism spectrum disorder      Subjective:  Patient was seen today for continuation of care, records reviewed and patient was discussed with the morning case review team.  Per staff, Franco has been medication and meal compliant.  He is pleasant and cooperative with peers and staff.      Franco was seen today for psychiatric follow-up.  On assessment today, Franco was seen in the hallway away from peers.  He is somewhat disheveled appearing in hospital attire.  He is calm and cooperative with the interview.   He continues to endorse some ongoing anxeity and depression.  He reports overall negative thinking process and worries frequently about the future and his living situation.  He also reports feeling anger sometimes because of lack of access to resources.  He is agreeable to continued titration of Zoloft for mood symptoms. He is sleeping well and has a good appetite.    Franco denies acute suicidal/self-harm ideation/intent/plan upon direct inquiry at this time.  Franco remains behaviorally appropriate, no agitation or aggression noted on exam or in report.  Franco also denies HI/AH/VH, and does not appear overtly manic nor does he present as internally preoccupied.  No overt delusions or paranoia are verbalized.  Franco remains adherent to his current psychotropic medication regimen and denies any side effects from medications, as well as none noted on exam.    Recommended Treatment: Treatment plan and medication changes discussed and per the attending physician the plan is:    1.Continue with group therapy, milieu therapy and occupational  therapy  2.Behavioral Health checks every 7 minutes  3.Continue frequent safety checks and vitals per unit protocol  4.Continue with SLIM medical management as indicated  5.Continue with current medication regimen: Zoloft increased to 75mg PO daily this morning.   6.Will review labs in the a.m.  7.Disposition Planning: Discharge planning and efforts remain ongoing    Vitals:  Vitals:    06/03/24 0743   BP: 126/67   Pulse: 65   Resp: 16   Temp: (!) 97.4 °F (36.3 °C)   SpO2: 97%       Laboratory Results:  I have personally reviewed all pertinent laboratory/tests results.  Most Recent Labs:   Lab Results   Component Value Date    WBC 5.42 05/15/2024    RBC 5.05 05/15/2024    HGB 16.1 05/15/2024    HCT 49.4 (H) 05/15/2024     05/15/2024    RDW 12.6 05/15/2024    NEUTROABS 2.83 05/15/2024    SODIUM 142 05/15/2024    K 4.1 05/15/2024     05/15/2024    CO2 28 05/15/2024    BUN 13 05/15/2024    CREATININE 0.97 05/15/2024    GLUC 89 05/15/2024    GLUF 89 05/15/2024    CALCIUM 9.3 05/15/2024    AST 21 05/15/2024    ALT 19 05/15/2024    ALKPHOS 66 05/15/2024    TP 7.6 05/15/2024    ALB 4.3 05/15/2024    TBILI 0.69 05/15/2024    CHOLESTEROL 191 05/15/2024    HDL 44 05/15/2024    TRIG 116 05/15/2024    LDLCALC 124 (H) 05/15/2024    NONHDLC 147 05/15/2024    XZY5LNRATPCD 5.162 (H) 05/15/2024    FREET4 0.60 (L) 05/15/2024       Psychiatric Review of Systems:  Behavior over the last 24 hours:  unchanged.   Sleep: adequate  Appetite: adequate  Medication side effects:  denies  ROS: no complaints, denies shortness of breath or chest pain and all other systems are negative for acute changes    Mental Status Evaluation:    Appearance:  disheveled, dressed in hospital attire, poor hygiene   Behavior:  cooperative, calm, poor eye contact   Speech:  slow   Mood:  depressed   Affect:  constricted   Thought Process:  linear, negative thinking   Associations: intact associations   Thought Content:  no overt delusions, negative  thinking, ruminating thoughts   Perceptual Disturbances: denies auditory or visual hallucinations when asked, does not appear responding to internal stimuli   Risk Potential: Suicidal ideation - None at present, contracts for safety on the unit, would talk to staff if not feeling safe on the unit  Homicidal ideation - None  Potential for aggression - No   Sensorium:  oriented to person, place, time/date, and situation   Memory:  recent memory intact   Consciousness:  alert and awake   Attention/Concentration: attention span and concentration are age appropriate   Insight:  improving   Judgment: improving   Gait/Station: normal gait/station   Motor Activity: no abnormal movements     Progress Toward Goals:   Franco is progressing towards goals of inpatient psychiatric treatment by continued medication compliance and is attending therapeutic modalities on the milieu. However, the patient continues to require inpatient psychiatric hospitalization for continued medication management and titration to optimize symptom reduction, improve sleep hygiene, and demonstrate adequate self-care.    Risk of Harm to Self:   Nursing Suicide Risk Assessment Last 24 hours: C-SSRS Risk (Since Last Contact)  Calculated C-SSRS Risk Score (Since Last Contact): No Risk Indicated  Current Specific Risk Factors include: mental illness diagnosis  Protective Factors: no current suicidal ideation, ability to communicate with staff on the unit, able to contract for safety on the unit, taking medications as ordered on the unit, improved mood, improved depressive symptoms, improved anxiety symptoms  Based on today's assessment, Franco presents the following risk of harm to self: low    Risk of Harm to Others:  Nursing Homicide Risk Assessment: Violence Risk to Others: Denies within past 6 months  Current Specific Risk Factors include: diagnosis of mood disorder  Protective Factors: no current homicidal ideation, no current psychotic symptoms,  compliant with medications on the unit as ordered, compliant with unit milieu, follows staff redirection  Based on today's assessment, Franco presents the following risk of harm to others: low    The following interventions are recommended: behavioral checks every 7 minutes, continued hospitalization on locked unit        Behavioral Health Medications: all current active meds have been reviewed and planned medication changes: Increasing Zoloft to 100mg PO daily .  Current Facility-Administered Medications   Medication Dose Route Frequency Provider Last Rate    acetaminophen  650 mg Oral Q6H PRN Basilio Brown MD      acetaminophen  650 mg Oral Q4H PRN Basilio Brown MD      acetaminophen  975 mg Oral Q6H PRN Basilio Brown MD      aluminum-magnesium hydroxide-simethicone  30 mL Oral Q4H PRN Basilio Brown MD      Artificial Tears  1 drop Both Eyes Q3H PRN Basilio Brown MD      atorvastatin  10 mg Oral Daily With Dinner WALLY Baptiste      benztropine  1 mg Intramuscular Q4H PRN Max 6/day Basilio Brown MD      benztropine  1 mg Oral Q4H PRN Max 6/day Basilio Brown MD      cyanocobalamin  1,000 mcg Oral Daily WALLY Baptiste      cyanocobalamin  1,000 mcg Intramuscular Once WALLY Baptiste      hydrOXYzine HCL  50 mg Oral Q6H PRN Max 4/day Basilio Brown MD      Or    diphenhydrAMINE  50 mg Intramuscular Q6H PRN Basilio Brown MD      ergocalciferol  50,000 Units Oral Weekly WALLY Baptiste      hydrOXYzine HCL  100 mg Oral Q6H PRN Max 4/day Basilio Brown MD      Or    LORazepam  2 mg Intramuscular Q6H PRN Basilio Brown MD      hydrOXYzine HCL  25 mg Oral Q6H PRN Max 4/day Basilio Brown MD      levothyroxine  25 mcg Oral Early Morning WALLY Baptiste      OLANZapine  5 mg Oral Q4H PRN Max 3/day Basilio Brown MD      Or    OLANZapine  2.5 mg Intramuscular Q4H PRN Max 3/day Basilio Garcia  MD Karyn      OLANZapine  5 mg Oral Q3H PRN Max 3/day Basilio Brown MD      Or    OLANZapine  5 mg Intramuscular Q3H PRN Max 3/day Basilio Brown MD      OLANZapine  2.5 mg Oral Q4H PRN Max 6/day Basilio Brown MD      polyethylene glycol  17 g Oral Daily PRN Basilio Brown MD      propranolol  10 mg Oral Q8H PRN Basilio Brown MD      senna-docusate sodium  1 tablet Oral Daily PRN Basilio Brown MD      [START ON 6/4/2024] sertraline  100 mg Oral Daily WALLY Gomez      traZODone  50 mg Oral HS PRN Basilio rBown MD         Risks / Benefits of Treatment:  Risks, benefits, and possible side effects of medications explained to patient and patient verbalizes understanding and agreement for treatment.    Counseling / Coordination of Care:  Patient's progress reviewed with nursing staff.  Medications, treatment progress and treatment plan reviewed with patient.  Supportive counseling provided to the patient.    Total floor/unit time spent today 25 minutes. Greater than 50% of total time was spent with the patient and / or family counseling and / or coordination of care. A description of the counseling / coordination of care: medication education, treatment plan, supportive therapy.

## 2024-06-03 NOTE — PROGRESS NOTES
06/03/24 0926   Team Meeting   Meeting Type Daily Rounds   Team Members Present   Team Members Present Physician;;Nurse   Physician Team Member Gisela   Nursing Team Member [facility] Management Team Member Noa   Patient/Family Present   Patient Present No   Patient's Family Present No     Pt is medication and meal compliant. Pt denies SI/HI/AVH. Pt appears to be pleasant and cooperative. Pt discharge is to be determined.

## 2024-06-04 PROCEDURE — 99232 SBSQ HOSP IP/OBS MODERATE 35: CPT

## 2024-06-04 RX ADMIN — SERTRALINE HYDROCHLORIDE 100 MG: 100 TABLET ORAL at 08:22

## 2024-06-04 RX ADMIN — LEVOTHYROXINE SODIUM 25 MCG: 25 TABLET ORAL at 06:55

## 2024-06-04 NOTE — NURSING NOTE
Pt refused scheduled Lipitor. Pt was educated in importance of order and risks associated with refusal as well as how refusal may impact treatment and overall wellness. Pt verbalizes understanding and confirms refusal.

## 2024-06-04 NOTE — NURSING NOTE
Pt verbally denies SI, HI and A/V hallucinations. Verbalizes ongoing depression and anxiety. Experienced hallucinations of voices last night, but has not had a reoccurrence after waking up this morning. Cooperative and redirectable; socially keeps to self but visible within milieu. Compliant with meds, although declined b12.

## 2024-06-04 NOTE — NURSING NOTE
Pt had interrupted sleep.Pt was up at times and was pacing up and down the halls. Pt is currently sleeping at this time.

## 2024-06-04 NOTE — PROGRESS NOTES
"Progress Note - Behavioral Health   Franco Roberson 39 y.o. male MRN: [id number]  Unit/Bed#: -02 Encounter: [id number]      Assessment & Plan   Principal Problem:    Unspecified depressive disorder (HCC)  Active Problems:    Medical clearance for psychiatric admission    Hyperlipidemia    Vitamin D deficiency    B12 deficiency    Hypothyroidism    Rule Out Autism spectrum disorder      Subjective:  Patient was seen today for continuation of care, records reviewed and patient was discussed with the morning case review team.  Per staff, Franco has visible on the unit and attended groups yesterday.  He had interrupted sleep last night.  Compliant with meals and medication.     Franco was seen today for psychiatric follow-up.  On assessment today, Franco was seen in the group room.  Franco reports his mood is \"the same\" today, he continues with some anxiety and depression.  He appears slightly less anxious today.  He denies any AH/VH and does not appear internally preoccupied.  He reports that his sleep was interrupted last night because there was a new admission that was loud on the unit.  He reports he has been sleeping well overall and does not feel he needs medication for sleep at this time.  He was educated on PRNs avaiable for insomnia.     Franco denies acute suicidal/self-harm ideation/intent/plan upon direct inquiry at this time.  Franco remains behaviorally appropriate, no agitation or aggression noted on exam or in report.   No overt delusions or paranoia are verbalized.  Franco remains adherent to his current psychotropic medication regimen and denies any side effects from medications, as well as none noted on exam.    Recommended Treatment: Treatment plan and medication changes discussed and per the attending physician the plan is:    1.Continue with group therapy, milieu therapy and occupational therapy  2.Behavioral Health checks every 7 minutes  3.Continue frequent safety checks and vitals per unit " protocol  4.Continue with SLIM medical management as indicated  5.Continue with current medication regimen: Continue Zoloft 100mg PO daily for anxiety/depression  6.Will review labs in the a.m.  7.Disposition Planning: Discharge planning and efforts remain ongoing    Vitals:  Vitals:    06/04/24 0807   BP: 120/77   Pulse: 69   Resp:    Temp: (!) 97 °F (36.1 °C)   SpO2: 98%       Laboratory Results:  I have personally reviewed all pertinent laboratory/tests results.  Most Recent Labs:   Lab Results   Component Value Date    WBC 5.42 05/15/2024    RBC 5.05 05/15/2024    HGB 16.1 05/15/2024    HCT 49.4 (H) 05/15/2024     05/15/2024    RDW 12.6 05/15/2024    NEUTROABS 2.83 05/15/2024    SODIUM 142 05/15/2024    K 4.1 05/15/2024     05/15/2024    CO2 28 05/15/2024    BUN 13 05/15/2024    CREATININE 0.97 05/15/2024    GLUC 89 05/15/2024    GLUF 89 05/15/2024    CALCIUM 9.3 05/15/2024    AST 21 05/15/2024    ALT 19 05/15/2024    ALKPHOS 66 05/15/2024    TP 7.6 05/15/2024    ALB 4.3 05/15/2024    TBILI 0.69 05/15/2024    CHOLESTEROL 191 05/15/2024    HDL 44 05/15/2024    TRIG 116 05/15/2024    LDLCALC 124 (H) 05/15/2024    NONHDLC 147 05/15/2024    [id number] 5.162 (H) 05/15/2024    FREET4 0.60 (L) 05/15/2024       Psychiatric Review of Systems:  Behavior over the last 24 hours:  some slow improvement  Sleep: adequate  Appetite: adequate  Medication side effects:  denies  ROS: no complaints, denies shortness of breath or chest pain and all other systems are negative for acute changes    Mental Status Evaluation:    Appearance:  casually dressed, marginal hygiene   Behavior:  cooperative, calm, better eye contact   Speech:  slow   Mood:  less anxious   Affect:  constricted   Thought Process:  linear, negative thinking   Associations: intact associations   Thought Content:  no overt delusions, negative thinking, ruminating thoughts   Perceptual Disturbances: denies auditory or visual hallucinations when asked,  does not appear responding to internal stimuli   Risk Potential: Suicidal ideation - None at present, contracts for safety on the unit, would talk to staff if not feeling safe on the unit  Homicidal ideation - None  Potential for aggression - No   Sensorium:  oriented to person, place, time/date, and situation   Memory:  recent memory intact   Consciousness:  alert and awake   Attention/Concentration: attention span and concentration are age appropriate   Insight:  improving   Judgment: improving   Gait/Station: normal gait/station   Motor Activity: no abnormal movements     Progress Toward Goals:   Franco is progressing towards goals of inpatient psychiatric treatment by continued medication compliance and is attending therapeutic modalities on the milieu. However, the patient continues to require inpatient psychiatric hospitalization for continued medication management and titration to optimize symptom reduction, improve sleep hygiene, and demonstrate adequate self-care.    Risk of Harm to Self:   Nursing Suicide Risk Assessment Last 24 hours: C-SSRS Risk (Since Last Contact)  Calculated C-SSRS Risk Score (Since Last Contact): No Risk Indicated  Current Specific Risk Factors include: mental illness diagnosis  Protective Factors: no current suicidal ideation, ability to communicate with staff on the unit, able to contract for safety on the unit, taking medications as ordered on the unit, improved mood, improved depressive symptoms, improved anxiety symptoms  Based on today's assessment, Franco presents the following risk of harm to self: low    Risk of Harm to Others:  Nursing Homicide Risk Assessment: Violence Risk to Others: Denies within past 6 months  Current Specific Risk Factors include: diagnosis of mood disorder  Protective Factors: no current homicidal ideation, no current psychotic symptoms, compliant with medications on the unit as ordered, compliant with unit milieu, follows staff redirection  Based on  today's assessment, Franco presents the following risk of harm to others: low    The following interventions are recommended: behavioral checks every 7 minutes, continued hospitalization on locked unit        Behavioral Health Medications: all current active meds have been reviewed and continue current psychiatric medications.  Current Facility-Administered Medications   Medication Dose Route Frequency Provider Last Rate    acetaminophen  650 mg Oral Q6H PRN Basilio Brown MD      acetaminophen  650 mg Oral Q4H PRN Basilio Brown MD      acetaminophen  975 mg Oral Q6H PRN Basilio Brown MD      aluminum-magnesium hydroxide-simethicone  30 mL Oral Q4H PRN Basilio Brown MD      Artificial Tears  1 drop Both Eyes Q3H PRN Basilio Brown MD      atorvastatin  10 mg Oral Daily With Dinner WALLY Baptiste      benztropine  1 mg Intramuscular Q4H PRN Max 6/day Basilio Brown MD      benztropine  1 mg Oral Q4H PRN Max 6/day Basilio Brown MD      cyanocobalamin  1,000 mcg Oral Daily WALLY Baptiste      cyanocobalamin  1,000 mcg Intramuscular Once WALLY Baptiste      hydrOXYzine HCL  50 mg Oral Q6H PRN Max 4/day Basilio Brown MD      Or    diphenhydrAMINE  50 mg Intramuscular Q6H PRN Basilio Brown MD      ergocalciferol  50,000 Units Oral Weekly WALLY Baptiste      hydrOXYzine HCL  100 mg Oral Q6H PRN Max 4/day Basilio Brown MD      Or    LORazepam  2 mg Intramuscular Q6H PRN Basilio Brown MD      hydrOXYzine HCL  25 mg Oral Q6H PRN Max 4/day Basilio Brown MD      levothyroxine  25 mcg Oral Early Morning WALLY Baptiste      OLANZapine  5 mg Oral Q4H PRN Max 3/day Basilio Brown MD      Or    OLANZapine  2.5 mg Intramuscular Q4H PRN Max 3/day Basilio Brown MD      OLANZapine  5 mg Oral Q3H PRN Max 3/day Basilio Brown MD      Or    OLANZapine  5 mg Intramuscular Q3H PRN Max  3/day Basilio Brown MD      OLANZapine  2.5 mg Oral Q4H PRN Max 6/day Basilio Brown MD      polyethylene glycol  17 g Oral Daily PRN Basilio Brown MD      propranolol  10 mg Oral Q8H PRN Basilio Brown MD      senna-docusate sodium  1 tablet Oral Daily PRN Basilio Brown MD      sertraline  100 mg Oral Daily WALLY Gomez      traZODone  50 mg Oral HS PRN Basilio Brown MD         Risks / Benefits of Treatment:  Risks, benefits, and possible side effects of medications explained to patient and patient verbalizes understanding and agreement for treatment.    Counseling / Coordination of Care:  Patient's progress reviewed with nursing staff.  Medications, treatment progress and treatment plan reviewed with patient.  Supportive counseling provided to the patient.    Total floor/unit time spent today 25 minutes. Greater than 50% of total time was spent with the patient and / or family counseling and / or coordination of care. A description of the counseling / coordination of care: medication education, treatment plan, supportive therapy.

## 2024-06-04 NOTE — PLAN OF CARE
Problem: Ineffective Coping  Goal: Identifies ineffective coping skills  Outcome: Progressing  Goal: Identifies healthy coping skills  Outcome: Progressing  Goal: Demonstrates healthy coping skills  Outcome: Progressing  Goal: Participates in unit activities  Description: Interventions:  - Provide therapeutic environment   - Provide required programming   - Redirect inappropriate behaviors   Outcome: Progressing     Problem: Depression  Goal: Treatment Goal: Demonstrate behavioral control of depressive symptoms, verbalize feelings of improved mood/affect, and adopt new coping skills prior to discharge  Outcome: Progressing  Goal: Verbalize thoughts and feelings  Description: Interventions:  - Assess and re-assess patient's level of risk   - Engage patient in 1:1 interactions, daily, for a minimum of 15 minutes   - Encourage patient to express feelings, fears, frustrations, hopes   Outcome: Progressing  Goal: Refrain from harming self  Description: Interventions:  - Monitor patient closely, per order   - Supervise medication ingestion, monitor effects and side effects   Outcome: Progressing  Goal: Refrain from isolation  Description: Interventions:  - Develop a trusting relationship   - Encourage socialization   Outcome: Progressing  Goal: Refrain from self-neglect  Outcome: Progressing  Goal: Attend and participate in unit activities, including therapeutic, recreational, and educational groups  Description: Interventions:  - Provide therapeutic and educational activities daily, encourage attendance and participation, and document same in the medical record   Outcome: Progressing  Goal: Complete daily ADLs, including personal hygiene independently, as able  Description: Interventions:  - Observe, teach, and assist patient with ADLS  -  Monitor and promote a balance of rest/activity, with adequate nutrition and elimination   Outcome: Progressing     Problem: Anxiety  Goal: Anxiety is at manageable  level  Description: Interventions:  - Assess and monitor patient's anxiety level.   - Monitor for signs and symptoms (heart palpitations, chest pain, shortness of breath, headaches, nausea, feeling jumpy, restlessness, irritable, apprehensive).   - Collaborate with interdisciplinary team and initiate plan and interventions as ordered.  - [place] patient to unit/surroundings  - Explain treatment plan  - Encourage participation in care  - Encourage verbalization of concerns/fears  - Identify coping mechanisms  - Assist in developing anxiety-reducing skills  - Administer/offer alternative therapies  - Limit or eliminate stimulants  Outcome: Progressing     Problem: DISCHARGE PLANNING - CARE MANAGEMENT  Goal: Discharge to post-acute care or home with appropriate resources  Description: INTERVENTIONS:  - Conduct assessment to determine patient/family and health care team treatment goals, and need for post-acute services based on payer coverage, community resources, and patient preferences, and barriers to discharge  - Address psychosocial, clinical, and financial barriers to discharge as identified in assessment in conjunction with the patient/family and health care team  - Arrange appropriate level of post-acute services according to patient’s   needs and preference and payer coverage in collaboration with the physician and health care team  - Communicate with and update the patient/family, physician, and health care team regarding progress on the discharge plan  - Arrange appropriate transportation to post-acute venues  Outcome: Progressing     Problem: Knowledge Deficit  Goal: Patient/family/caregiver demonstrates understanding of disease process, treatment plan, medications, and discharge instructions  Description: Complete learning assessment and assess knowledge base.  Interventions:  - Provide teaching at level of understanding  - Provide teaching via preferred learning methods  Outcome: Progressing

## 2024-06-04 NOTE — PROGRESS NOTES
06/04/24 0935   Team Meeting   Meeting Type Daily Rounds   Team Members Present   Team Members Present Physician;Nurse;   Physician Team Member Lucero   [facility]ing [facility] Member Suhas   Patient/Family Present   Patient Present No   Patient's Family Present No     Pleasant, cooperative, calm. Pt agitated in group yesterday. Visible on the unit, attending groups. Interrupted sleep. Discharge to be determined.

## 2024-06-05 PROCEDURE — 99232 SBSQ HOSP IP/OBS MODERATE 35: CPT

## 2024-06-05 RX ADMIN — SERTRALINE HYDROCHLORIDE 100 MG: 100 TABLET ORAL at 08:17

## 2024-06-05 RX ADMIN — LEVOTHYROXINE SODIUM 25 MCG: 25 TABLET ORAL at 06:59

## 2024-06-05 NOTE — SOCIAL WORK
Cm spoke with [facility] and they reported that Pt needs to have a different Dx such as Major Depressive with reoccurrence in order to qualify for their services. They also provided CM with updated referral to complete and resubmit if Pt's Dx change at all.

## 2024-06-05 NOTE — PROGRESS NOTES
"Progress Note - Behavioral Health   Franco Roberson 39 y.o. male MRN: [id number]  Unit/Bed#: U 352-02 Encounter: [id number]      Assessment & Plan   Principal Problem:    Unspecified depressive disorder (HCC)  Active Problems:    Medical clearance for psychiatric admission    Hyperlipidemia    Vitamin D deficiency    B12 deficiency    Hypothyroidism    Rule Out Autism spectrum disorder      Subjective:  Patient was seen today for continuation of care, records reviewed and patient was discussed with the morning case review team.  Per staff, Franco has visible on the unit but mostly isolative to self.  He is attending group regularly.      Franco was seen today for psychiatric follow-up.  On assessment today, Franco was seen in the group room.  Franco continues to reports depression with feeling of hopelessness for the future.  He reports not feeling much improvement with Zoloft but this writer noted he is out of his room more and attending groups.  Franco then reports, \"I guess I am starting to feel more comfortable\".  He is agreeable to further titration of Zoloft for continued depression symptoms.  He denies any side effects from medications.  He denies any AH/VH and does not appear internally preoccupied.     Franco denies acute suicidal/self-harm ideation/intent/plan upon direct inquiry at this time.  Franco remains behaviorally appropriate, no agitation or aggression noted on exam or in report.   No overt delusions or paranoia are verbalized.  Franco remains adherent to his current psychotropic medication regimen and denies any side effects from medications, as well as none noted on exam.    Recommended Treatment: Treatment plan and medication changes discussed and per the attending physician the plan is:    1.Continue with group therapy, milieu therapy and occupational therapy  2.Behavioral Health checks every 7 minutes  3.Continue frequent safety checks and vitals per unit protocol  4.Continue with SLIM medical " management as indicated  5.Continue with current medication regimen: Increasing Zoloft to 125mg PO daily for anxiety/depression  6.Will review labs in the a.m.  7.Disposition Planning: Discharge planning and efforts remain ongoing    Vitals:  Vitals:    06/05/24 0741   BP: 128/66   Pulse: 74   Resp: 16   Temp: 97.8 °F (36.6 °C)   SpO2: 98%       Laboratory Results:  I have personally reviewed all pertinent laboratory/tests results.  Most Recent Labs:   Lab Results   Component Value Date    WBC 5.42 05/15/2024    RBC 5.05 05/15/2024    HGB 16.1 05/15/2024    HCT 49.4 (H) 05/15/2024     05/15/2024    RDW 12.6 05/15/2024    NEUTROABS 2.83 05/15/2024    SODIUM 142 05/15/2024    K 4.1 05/15/2024     05/15/2024    CO2 28 05/15/2024    BUN 13 05/15/2024    CREATININE 0.97 05/15/2024    GLUC 89 05/15/2024    GLUF 89 05/15/2024    CALCIUM 9.3 05/15/2024    AST 21 05/15/2024    ALT 19 05/15/2024    ALKPHOS 66 05/15/2024    TP 7.6 05/15/2024    ALB 4.3 05/15/2024    TBILI 0.69 05/15/2024    CHOLESTEROL 191 05/15/2024    HDL 44 05/15/2024    TRIG 116 05/15/2024    LDLCALC 124 (H) 05/15/2024    NONHDLC 147 05/15/2024    [id number] 5.162 (H) 05/15/2024    FREET4 0.60 (L) 05/15/2024       Psychiatric Review of Systems:  Behavior over the last 24 hours:  some slow improvement  Sleep: adequate  Appetite: adequate  Medication side effects:  denies  ROS: no complaints, denies shortness of breath or chest pain and all other systems are negative for acute changes    Mental Status Evaluation:    Appearance:  marginal hygiene, dressed in hospital attire   Behavior:  cooperative, calm, fair eye contact   Speech:  slow   Mood:  less anxious   Affect:  constricted   Thought Process:  linear, negative thinking   Associations: intact associations   Thought Content:  no overt delusions, negative thinking, ruminating thoughts   Perceptual Disturbances: denies auditory or visual hallucinations when asked, does not appear responding  to internal stimuli   Risk Potential: Suicidal ideation - None at present, contracts for safety on the unit, would talk to staff if not feeling safe on the unit  Homicidal ideation - None  Potential for aggression - No   Sensorium:  oriented to person, place, time/date, and situation   Memory:  recent memory intact   Consciousness:  alert and awake   Attention/Concentration: attention span and concentration are age appropriate   Insight:  improving   Judgment: improving   Gait/Station: normal gait/station   Motor Activity: no abnormal movements     Progress Toward Goals:   Franco is progressing towards goals of inpatient psychiatric treatment by continued medication compliance and is attending therapeutic modalities on the milieu. However, the patient continues to require inpatient psychiatric hospitalization for continued medication management and titration to optimize symptom reduction, improve sleep hygiene, and demonstrate adequate self-care.    Risk of Harm to Self:   Nursing Suicide Risk Assessment Last 24 hours: C-SSRS Risk (Since Last Contact)  Calculated C-SSRS Risk Score (Since Last Contact): No Risk Indicated  Current Specific Risk Factors include: mental illness diagnosis  Protective Factors: no current suicidal ideation, ability to communicate with staff on the unit, able to contract for safety on the unit, taking medications as ordered on the unit, improved mood, improved depressive symptoms, improved anxiety symptoms  Based on today's assessment, Franco presents the following risk of harm to self: low    Risk of Harm to Others:  Nursing Homicide Risk Assessment: Violence Risk to Others: Denies within past 6 months  Current Specific Risk Factors include: diagnosis of mood disorder  Protective Factors: no current homicidal ideation, no current psychotic symptoms, compliant with medications on the unit as ordered, compliant with unit milieu, follows staff redirection  Based on today's assessmentFranco  presents the following risk of harm to others: low    The following interventions are recommended: behavioral checks every 7 minutes, continued hospitalization on locked unit        Behavioral Health Medications: all current active meds have been reviewed and planned medication changes: increasing Zoloft to 125mg PO daily .  Current Facility-Administered Medications   Medication Dose Route Frequency Provider Last Rate    acetaminophen  650 mg Oral Q6H PRN Basilio Brown MD      acetaminophen  650 mg Oral Q4H PRN Basilio Brown MD      acetaminophen  975 mg Oral Q6H PRN Basilio Brown MD      aluminum-magnesium hydroxide-simethicone  30 mL Oral Q4H PRN Basilio Brown MD      Artificial Tears  1 drop Both Eyes Q3H PRN Basilio Brown MD      atorvastatin  10 mg Oral Daily With Dinner WALLY Baptiste      benztropine  1 mg Intramuscular Q4H PRN Max 6/day Basilio Brown MD      benztropine  1 mg Oral Q4H PRN Max 6/day Basilio Brown MD      cyanocobalamin  1,000 mcg Oral Daily WALLY Baptiste      cyanocobalamin  1,000 mcg Intramuscular Once WALLY Baptiste      hydrOXYzine HCL  50 mg Oral Q6H PRN Max 4/day Basilio Brown MD      Or    diphenhydrAMINE  50 mg Intramuscular Q6H PRN Basilio Brown MD      ergocalciferol  50,000 Units Oral Weekly WALLY Baptiste      hydrOXYzine HCL  100 mg Oral Q6H PRN Max 4/day Basilio Brown MD      Or    LORazepam  2 mg Intramuscular Q6H PRN Basilio Brown MD      hydrOXYzine HCL  25 mg Oral Q6H PRN Max 4/day Basilio Brown MD      levothyroxine  25 mcg Oral Early Morning WALLY Baptiste      OLANZapine  5 mg Oral Q4H PRN Max 3/day Basilio Brown MD      Or    OLANZapine  2.5 mg Intramuscular Q4H PRN Max 3/day Basilio Brown MD      OLANZapine  5 mg Oral Q3H PRN Max 3/day Basilio Brown MD      Or    OLANZapine  5 mg Intramuscular Q3H PRN Max 3/day  Basilio Brown MD      OLANZapine  2.5 mg Oral Q4H PRN Max 6/day Basilio Brown MD      polyethylene glycol  17 g Oral Daily PRN Basilio Brown MD      propranolol  10 mg Oral Q8H PRN Basilio Brown MD      senna-docusate sodium  1 tablet Oral Daily PRN Basilio Brown MD      sertraline  100 mg Oral Daily WALLY Gomez      traZODone  50 mg Oral HS PRN Basilio Brown MD         Risks / Benefits of Treatment:  Risks, benefits, and possible side effects of medications explained to patient and patient verbalizes understanding and agreement for treatment.    Counseling / Coordination of Care:  Patient's progress reviewed with nursing staff.  Medications, treatment progress and treatment plan reviewed with patient.  Supportive counseling provided to the patient.    Total floor/unit time spent today 25 minutes. Greater than 50% of total time was spent with the patient and / or family counseling and / or coordination of care. A description of the counseling / coordination of care: medication education, treatment plan, supportive therapy.

## 2024-06-05 NOTE — PLAN OF CARE
Problem: Ineffective Coping  Goal: Identifies ineffective coping skills  Outcome: Progressing  Goal: Identifies healthy coping skills  Outcome: Progressing  Goal: Demonstrates healthy coping skills  Outcome: Progressing  Goal: Participates in unit activities  Description: Interventions:  - Provide therapeutic environment   - Provide required programming   - Redirect inappropriate behaviors   Outcome: Progressing     Problem: Risk for Self Injury/Neglect  Goal: Treatment Goal: Remain safe during length of stay, learn and adopt new coping skills, and be free of self-injurious ideation, impulses and acts at the time of discharge  Outcome: Progressing     Problem: Depression  Goal: Treatment Goal: Demonstrate behavioral control of depressive symptoms, verbalize feelings of improved mood/affect, and adopt new coping skills prior to discharge  Outcome: Progressing  Goal: Verbalize thoughts and feelings  Description: Interventions:  - Assess and re-assess patient's level of risk   - Engage patient in 1:1 interactions, daily, for a minimum of 15 minutes   - Encourage patient to express feelings, fears, frustrations, hopes   Outcome: Progressing  Goal: Refrain from harming self  Description: Interventions:  - Monitor patient closely, per order   - Supervise medication ingestion, monitor effects and side effects   Outcome: Progressing  Goal: Refrain from isolation  Description: Interventions:  - Develop a trusting relationship   - Encourage socialization   Outcome: Progressing  Goal: Refrain from self-neglect  Outcome: Progressing  Goal: Attend and participate in unit activities, including therapeutic, recreational, and educational groups  Description: Interventions:  - Provide therapeutic and educational activities daily, encourage attendance and participation, and document same in the medical record   Outcome: Progressing  Goal: Complete daily ADLs, including personal hygiene independently, as able  Description:  Interventions:  - Observe, teach, and assist patient with ADLS  -  Monitor and promote a balance of rest/activity, with adequate nutrition and elimination   Outcome: Progressing     Problem: Anxiety  Goal: Anxiety is at manageable level  Description: Interventions:  - Assess and monitor patient's anxiety level.   - Monitor for signs and symptoms (heart palpitations, chest pain, shortness of breath, headaches, nausea, feeling jumpy, restlessness, irritable, apprehensive).   - Collaborate with interdisciplinary team and initiate plan and interventions as ordered.  - [place] patient to unit/surroundings  - Explain treatment plan  - Encourage participation in care  - Encourage verbalization of concerns/fears  - Identify coping mechanisms  - Assist in developing anxiety-reducing skills  - Administer/offer alternative therapies  - Limit or eliminate stimulants  Outcome: Progressing     Problem: DISCHARGE PLANNING - CARE MANAGEMENT  Goal: Discharge to post-acute care or home with appropriate resources  Description: INTERVENTIONS:  - Conduct assessment to determine patient/family and health care team treatment goals, and need for post-acute services based on payer coverage, community resources, and patient preferences, and barriers to discharge  - Address psychosocial, clinical, and financial barriers to discharge as identified in assessment in conjunction with the patient/family and health care team  - Arrange appropriate level of post-acute services according to patient’s   needs and preference and payer coverage in collaboration with the physician and health care team  - Communicate with and update the patient/family, physician, and health care team regarding progress on the discharge plan  - Arrange appropriate transportation to post-acute venues  Outcome: Progressing     Problem: Knowledge Deficit  Goal: Patient/family/caregiver demonstrates understanding of disease process, treatment plan, medications, and discharge  instructions  Description: Complete learning assessment and assess knowledge base.  Interventions:  - Provide teaching at level of understanding  - Provide teaching via preferred learning methods  Outcome: Progressing

## 2024-06-05 NOTE — NURSING NOTE
Patient is isolative to himself even when he is out of his room. He physically stays on the outskirts of the milieu when he leaves his room and he does not make eye contact with people except fleetingly.  He denies S.I.H.I.A/H V/H

## 2024-06-05 NOTE — PROGRESS NOTES
06/05/24 0918   Team Meeting   Meeting Type Daily Rounds   Team Members Present   Team Members Present Physician;;Nurse   Physician Team Member Lucero   [facility] Noa   Patient/Family Present   Patient Present No   Patient's Family Present No     Pt refuses medical medications. Pt is observed to be seclusive to himself. Pt is psych medication and meal compliant. Pt's discharge is to be determined.

## 2024-06-05 NOTE — NURSING NOTE
Received Patient at 0700.  Patient has been awake, alert, and visible intermittently out in the milieu.  Denies any depression, anxiety, a/v hallucinations. Patient denies any unmeet needs . Patient remain Q 7 min check.

## 2024-06-05 NOTE — PLAN OF CARE
Pt attends groups regularly. Pt reports it was difficult for him to attend groups at first, but he does feel it is getting more comfortable.

## 2024-06-06 PROCEDURE — 99232 SBSQ HOSP IP/OBS MODERATE 35: CPT | Performed by: PSYCHIATRY & NEUROLOGY

## 2024-06-06 RX ADMIN — LEVOTHYROXINE SODIUM 25 MCG: 25 TABLET ORAL at 06:44

## 2024-06-06 RX ADMIN — SERTRALINE HYDROCHLORIDE 125 MG: 100 TABLET ORAL at 08:38

## 2024-06-06 NOTE — PROGRESS NOTES
06/06/24 0922   Team Meeting   Meeting Type Daily Rounds   Team Members Present   Team Members Present Physician;Nurse;   Physician Team Member Lucero   [facility] Suhas   Patient/Family Present   Patient Present No   Patient's Family Present No     Pt med/meal compliant. Visible on the unit. Social with select peers. Discharge to be determined.

## 2024-06-06 NOTE — NURSING NOTE
Pt quiet and to self, walking in halls and watching tv in dayroom with minimal socializing with a female peer. Denies all symptoms, needs and concerns. Soft spoken and polite, depressed with poor eye contact. Safety checks ongoing.

## 2024-06-06 NOTE — PROGRESS NOTES
Progress Note - Behavioral Health   Franco Roberson 39 y.o. male MRN: [id number]  Unit/Bed#: U 352-02 Encounter: [id number]    Assessment & Plan   Principal Problem:    Unspecified depressive disorder (HCC)  Active Problems:    Medical clearance for psychiatric admission    Hyperlipidemia    Vitamin D deficiency    B12 deficiency    Hypothyroidism    Rule Out Autism spectrum disorder      Subjective:  Patient was seen today for continuation of care, records reviewed and patient was discussed with the morning case review team.  Per staff, Franco has been medication and meal compliant.  He continues to be visible on the unit and social with select peers.     Franco was seen today for psychiatric follow-up.  On assessment today, Franco was seen in the hallway away from peers.  Franco was calm and cooperative with the interview.  He reports improved sleep last night.  He states that he continues with some depression and irritability.  He reports a peer made him feel angry yesterday but he just walked away from the situation.  His appetite is good.      Franco denies acute suicidal/self-harm ideation/intent/plan upon direct inquiry at this time.  Franco remains behaviorally controlled, isolative to self, no agitation or aggression noted on exam or in report.  Franco also denies HI/AH/VH, and does not appear overtly manic.  No overt delusions or paranoia are verbalized.   Franco remains adherent to his current psychotropic medication regimen and denies any side effects from medications, as well as none noted on exam.    Recommended Treatment: Treatment plan and medication changes discussed and per the attending physician the plan is:    1.Continue with group therapy, milieu therapy and occupational therapy  2.Behavioral Health checks every 7 minutes  3.Continue frequent safety checks and vitals per unit protocol  4.Continue with SLIM medical management as indicated  5.Continue with current medication regimen: Zoloft was  increased to 125mg PO daily this morning  6.Will review labs in the a.m.  7.Disposition Planning: Discharge planning and efforts remain ongoing    Vitals:  Vitals:    06/06/24 0740   BP: 128/80   Pulse: 58   Resp:    Temp: (!) 97.4 °F (36.3 °C)   SpO2: 97%       Laboratory Results:  I have personally reviewed all pertinent laboratory/tests results.  Most Recent Labs:   Lab Results   Component Value Date    WBC 5.42 05/15/2024    RBC 5.05 05/15/2024    HGB 16.1 05/15/2024    HCT 49.4 (H) 05/15/2024     05/15/2024    RDW 12.6 05/15/2024    NEUTROABS 2.83 05/15/2024    SODIUM 142 05/15/2024    K 4.1 05/15/2024     05/15/2024    CO2 28 05/15/2024    BUN 13 05/15/2024    CREATININE 0.97 05/15/2024    GLUC 89 05/15/2024    GLUF 89 05/15/2024    CALCIUM 9.3 05/15/2024    AST 21 05/15/2024    ALT 19 05/15/2024    ALKPHOS 66 05/15/2024    TP 7.6 05/15/2024    ALB 4.3 05/15/2024    TBILI 0.69 05/15/2024    CHOLESTEROL 191 05/15/2024    HDL 44 05/15/2024    TRIG 116 05/15/2024    LDLCALC 124 (H) 05/15/2024    NONHDLC 147 05/15/2024    [id number] 5.162 (H) 05/15/2024    FREET4 0.60 (L) 05/15/2024       Psychiatric Review of Systems:  Behavior over the last 24 hours:  slowly improving.   Sleep: adequate  Appetite: adequate  Medication side effects: denies  ROS: no complaints, denies shortness of breath or chest pain and all other systems are negative for acute changes    Mental Status Evaluation:    Appearance:  casually dressed, marginal hygiene, overweight   Behavior:  pleasant, cooperative, calm, minimal eye contact   Speech:  normal rate and volume   Mood:  dysphoric   Affect:  constricted   Thought Process:  organized, linear   Associations: intact associations   Thought Content:  no overt delusions, negative thinking   Perceptual Disturbances: denies auditory or visual hallucinations when asked, does not appear responding to internal stimuli   Risk Potential: Suicidal ideation - None at present, contracts  for safety on the unit, would talk to staff if not feeling safe on the unit  Homicidal ideation - None  Potential for aggression - No   Sensorium:  oriented to person, place, and situation   Memory:  recent memory intact   Consciousness:  alert and awake   Attention/Concentration: attention span and concentration appear shorter than expected for age   Insight:  limited   Judgment: limited   Gait/Station: normal gait/station   Motor Activity: no abnormal movements     Progress Toward Goals:   Franco is progressing towards goals of inpatient psychiatric treatment by continued medication compliance and is attending therapeutic modalities on the milieu. However, the patient continues to require inpatient psychiatric hospitalization for continued medication management and titration to optimize symptom reduction, improve sleep hygiene, and demonstrate adequate self-care.    Risk of Harm to Self:   Nursing Suicide Risk Assessment Last 24 hours: C-SSRS Risk (Since Last Contact)  Calculated C-SSRS Risk Score (Since Last Contact): No Risk Indicated  Current Specific Risk Factors include: diagnosis of mood disorder  Protective Factors: no current suicidal ideation, ability to communicate with staff on the unit, able to contract for safety on the unit  Based on today's assessment, Franco presents the following risk of harm to self: minimal    Risk of Harm to Others:  Nursing Homicide Risk Assessment: Violence Risk to Others: Denies within past 6 months  Current Specific Risk Factors include: diagnosis of mood disorder  Protective Factors: no current homicidal ideation, compliant with medications on the unit as ordered, compliant with unit milieu, follows staff redirection  Based on today's assessment, Franco presents the following risk of harm to others: minimal    The following interventions are recommended: behavioral checks every 7 minutes, continued hospitalization on locked unit        Behavioral Health Medications: all  current active meds have been reviewed and continue current psychiatric medications.  Current Facility-Administered Medications   Medication Dose Route Frequency Provider Last Rate    acetaminophen  650 mg Oral Q6H PRN Basilio Brown MD      acetaminophen  650 mg Oral Q4H PRN Basilio Brown MD      acetaminophen  975 mg Oral Q6H PRN Basilio Brown MD      aluminum-magnesium hydroxide-simethicone  30 mL Oral Q4H PRN Basilio Brown MD      Artificial Tears  1 drop Both Eyes Q3H PRN Basilio Brown MD      atorvastatin  10 mg Oral Daily With Dinner WALLY Baptiste      benztropine  1 mg Intramuscular Q4H PRN Max 6/day Basilio Brown MD      benztropine  1 mg Oral Q4H PRN Max 6/day Basilio Brown MD      cyanocobalamin  1,000 mcg Oral Daily WALLY Baptiste      cyanocobalamin  1,000 mcg Intramuscular Once WALLY Baptiste      hydrOXYzine HCL  50 mg Oral Q6H PRN Max 4/day Basilio Brown MD      Or    diphenhydrAMINE  50 mg Intramuscular Q6H PRN Basilio Brown MD      ergocalciferol  50,000 Units Oral Weekly WALLY Baptiste      hydrOXYzine HCL  100 mg Oral Q6H PRN Max 4/day Basilio Brown MD      Or    LORazepam  2 mg Intramuscular Q6H PRN Basilio Brown MD      hydrOXYzine HCL  25 mg Oral Q6H PRN Max 4/day Basilio Brown MD      levothyroxine  25 mcg Oral Early Morning WALLY Baptiste      OLANZapine  5 mg Oral Q4H PRN Max 3/day Basilio Brown MD      Or    OLANZapine  2.5 mg Intramuscular Q4H PRN Max 3/day Basilio Brown MD      OLANZapine  5 mg Oral Q3H PRN Max 3/day Basilio Brown MD      Or    OLANZapine  5 mg Intramuscular Q3H PRN Max 3/day Basilio Brown MD      OLANZapine  2.5 mg Oral Q4H PRN Max 6/day Basilio Brown MD      polyethylene glycol  17 g Oral Daily PRN Basilio Brown MD      propranolol  10 mg Oral Q8H PRN Basilio Brown MD      senna-docusate  sodium  1 tablet Oral Daily PRN Basilio Brown MD      sertraline  125 mg Oral Daily WALLY Gomez      traZODone  50 mg Oral HS PRN Basilio Brown MD         Risks / Benefits of Treatment:  Risks, benefits, and possible side effects of medications explained to patient and patient verbalizes understanding and agreement for treatment.    Counseling / Coordination of Care:  Patient's progress reviewed with nursing staff.  Medications, treatment progress and treatment plan reviewed with patient.  Supportive counseling provided to the patient.    Total floor/unit time spent today 25 minutes. Greater than 50% of total time was spent with the patient and / or family counseling and / or coordination of care. A description of the counseling / coordination of care: medication education, treatment plan, supportive therapy.

## 2024-06-06 NOTE — NURSING NOTE
Pt is visible on the unit. Pt voicing he does not feel comfortable in groups due to some peers making fun of others as well as discussing topics that make him uncomfortable. Encouraged pt to make staff aware when this happens so we can appropriately intervene. Denies SI/HI/AH/VH. Meal compliant. Continues to only take his scheduled zoloft 125 mg.

## 2024-06-06 NOTE — PLAN OF CARE
Problem: Depression  Goal: Verbalize thoughts and feelings  Description: Interventions:  - Assess and re-assess patient's level of risk   - Engage patient in 1:1 interactions, daily, for a minimum of 15 minutes   - Encourage patient to express feelings, fears, frustrations, hopes   Outcome: Progressing  Goal: Refrain from isolation  Description: Interventions:  - Develop a trusting relationship   - Encourage socialization   Outcome: Progressing  Goal: Refrain from self-neglect  Outcome: Progressing

## 2024-06-07 PROBLEM — F32.9 MAJOR DEPRESSIVE DISORDER WITHOUT PSYCHOTIC FEATURES: Status: ACTIVE | Noted: 2024-06-07

## 2024-06-07 PROCEDURE — 99232 SBSQ HOSP IP/OBS MODERATE 35: CPT

## 2024-06-07 RX ADMIN — SERTRALINE HYDROCHLORIDE 125 MG: 100 TABLET ORAL at 09:10

## 2024-06-07 RX ADMIN — LEVOTHYROXINE SODIUM 25 MCG: 25 TABLET ORAL at 06:44

## 2024-06-07 NOTE — PROGRESS NOTES
Progress Note - Behavioral Health   Franco Roberson 39 y.o. male MRN: [id number]  Unit/Bed#: [facility] 352-02 Encounter: [id number]    Assessment & Plan   Principal Problem:    Major depressive disorder without psychotic features  Active Problems:    Unspecified depressive disorder (HCC)    Medical clearance for psychiatric admission    Hyperlipidemia    Vitamin D deficiency    B12 deficiency    Hypothyroidism    Rule Out Autism spectrum disorder      Subjective:  Patient was seen today for continuation of care, records reviewed and patient was discussed with the morning case review team.  Per staff, Franco continues to be visible on the unit.  He is compliant with meals and medication.      Franco was seen today for psychiatric follow-up.  On assessment today, Franco was seen in the group area away from peers.  He reports that he is noticing some improvement in his depression and anxiety.  He states he does feel more hopeful for the future.  He reports he has been able to be social with some peers on the unit which has given him more hope.  He feels the Zoloft has been helpful in easing his anxieties.  He continues to have intrusive thoughts.  He reports some intermittent irritability.       Franco denies acute suicidal/self-harm ideation/intent/plan upon direct inquiry at this time.  Franco remains behaviorally controlled, with no agitation or aggression noted on exam or in report.  Franco also denies HI/AH/VH, and does not appear overtly manic.  No overt delusions or paranoia are verbalized.   Franco remains adherent to his current psychotropic medication regimen and denies any side effects from medications, as well as none noted on exam.    Recommended Treatment: Treatment plan and medication changes discussed and per the attending physician the plan is:    1.Continue with group therapy, milieu therapy and occupational therapy  2.Behavioral Health checks every 7 minutes  3.Continue frequent safety checks and vitals per  unit protocol  4.Continue with SLIM medical management as indicated  5.Continue with current medication regimen: Continue Zoloft 125mg PO daily  6.Will review labs in the a.m.  7.Disposition Planning: Discharge planning and efforts remain ongoing    Vitals:  Vitals:    06/07/24 0733   BP: 116/82   Pulse: 74   Resp: 16   Temp: 97.6 °F (36.4 °C)   SpO2: 98%       Laboratory Results:  I have personally reviewed all pertinent laboratory/tests results.  Most Recent Labs:   Lab Results   Component Value Date    WBC 5.42 05/15/2024    RBC 5.05 05/15/2024    HGB 16.1 05/15/2024    HCT 49.4 (H) 05/15/2024     05/15/2024    RDW 12.6 05/15/2024    NEUTROABS 2.83 05/15/2024    SODIUM 142 05/15/2024    K 4.1 05/15/2024     05/15/2024    CO2 28 05/15/2024    BUN 13 05/15/2024    CREATININE 0.97 05/15/2024    GLUC 89 05/15/2024    GLUF 89 05/15/2024    CALCIUM 9.3 05/15/2024    AST 21 05/15/2024    ALT 19 05/15/2024    ALKPHOS 66 05/15/2024    TP 7.6 05/15/2024    ALB 4.3 05/15/2024    TBILI 0.69 05/15/2024    CHOLESTEROL 191 05/15/2024    HDL 44 05/15/2024    TRIG 116 05/15/2024    LDLCALC 124 (H) 05/15/2024    NONHDLC 147 05/15/2024    [id number] 5.162 (H) 05/15/2024    FREET4 0.60 (L) 05/15/2024       Psychiatric Review of Systems:  Behavior over the last 24 hours:  slowly improving.   Sleep: adequate  Appetite: adequate  Medication side effects: denies  ROS: no complaints, denies shortness of breath or chest pain and all other systems are negative for acute changes    Mental Status Evaluation:    Appearance:  casually dressed, marginal hygiene, overweight   Behavior:  pleasant, cooperative, calm, fair eye contact   Speech:  normal rate and volume   Mood:  slightly less anxious, slightly less depressed   Affect:  constricted   Thought Process:  organized, linear   Associations: intact associations   Thought Content:  no overt delusions, negative thinking   Perceptual Disturbances: denies auditory or visual  hallucinations when asked, does not appear responding to internal stimuli   Risk Potential: Suicidal ideation - None at present, contracts for safety on the unit, would talk to staff if not feeling safe on the unit  Homicidal ideation - None  Potential for aggression - No   Sensorium:  oriented to person, place, and situation   Memory:  recent memory intact   Consciousness:  alert and awake   Attention/Concentration: attention span and concentration appear shorter than expected for age   Insight:  partial   Judgment: partial   Gait/Station: normal gait/station   Motor Activity: no abnormal movements     Progress Toward Goals:   Franco is progressing towards goals of inpatient psychiatric treatment by continued medication compliance and is attending therapeutic modalities on the milieu. However, the patient continues to require inpatient psychiatric hospitalization for continued medication management and titration to optimize symptom reduction, improve sleep hygiene, and demonstrate adequate self-care.    Risk of Harm to Self:   Nursing Suicide Risk Assessment Last 24 hours: C-SSRS Risk (Since Last Contact)  Calculated C-SSRS Risk Score (Since Last Contact): No Risk Indicated  Current Specific Risk Factors include: diagnosis of mood disorder  Protective Factors: no current suicidal ideation, ability to communicate with staff on the unit, able to contract for safety on the unit  Based on today's assessment, Franco presents the following risk of harm to self: minimal    Risk of Harm to Others:  Nursing Homicide Risk Assessment: Violence Risk to Others: Denies within past 6 months  Current Specific Risk Factors include: diagnosis of mood disorder  Protective Factors: no current homicidal ideation, compliant with medications on the unit as ordered, compliant with unit milieu, follows staff redirection  Based on today's assessment, Franco presents the following risk of harm to others: minimal    The following  interventions are recommended: behavioral checks every 7 minutes, continued hospitalization on locked unit        Behavioral Health Medications: all current active meds have been reviewed and continue current psychiatric medications.  Current Facility-Administered Medications   Medication Dose Route Frequency Provider Last Rate    acetaminophen  650 mg Oral Q6H PRN Basilio Brown MD      acetaminophen  650 mg Oral Q4H PRN Basilio Brown MD      acetaminophen  975 mg Oral Q6H PRN Basilio Brown MD      aluminum-magnesium hydroxide-simethicone  30 mL Oral Q4H PRN Basilio Brown MD      Artificial Tears  1 drop Both Eyes Q3H PRN Basilio Brown MD      atorvastatin  10 mg Oral Daily With Dinner WALLY Baptiste      benztropine  1 mg Intramuscular Q4H PRN Max 6/day Basilio Brown MD      benztropine  1 mg Oral Q4H PRN Max 6/day Basilio Brown MD      cyanocobalamin  1,000 mcg Oral Daily WALLY Baptiste      cyanocobalamin  1,000 mcg Intramuscular Once WALLY Baptiste      hydrOXYzine HCL  50 mg Oral Q6H PRN Max 4/day Basliio Brown MD      Or    diphenhydrAMINE  50 mg Intramuscular Q6H PRN Basilio Brown MD      ergocalciferol  50,000 Units Oral Weekly WALLY Baptiste      hydrOXYzine HCL  100 mg Oral Q6H PRN Max 4/day Basilio Brown MD      Or    LORazepam  2 mg Intramuscular Q6H PRN Basilio Brown MD      hydrOXYzine HCL  25 mg Oral Q6H PRN Max 4/day Basilio Brown MD      levothyroxine  25 mcg Oral Early Morning WALLY Baptiste      OLANZapine  5 mg Oral Q4H PRN Max 3/day Basilio Brown MD      Or    OLANZapine  2.5 mg Intramuscular Q4H PRN Max 3/day Basilio Brown MD      OLANZapine  5 mg Oral Q3H PRN Max 3/day Basilio Brown MD      Or    OLANZapine  5 mg Intramuscular Q3H PRN Max 3/day Basilio Brown MD      OLANZapine  2.5 mg Oral Q4H PRN Max 6/day Basilio Brown MD       polyethylene glycol  17 g Oral Daily PRN Basilio Brown MD      propranolol  10 mg Oral Q8H PRN Basilio Brown MD      senna-docusate sodium  1 tablet Oral Daily PRN Basilio Brown MD      sertraline  125 mg Oral Daily WALLY Gomez      traZODone  50 mg Oral HS PRN Basilio Brown MD         Risks / Benefits of Treatment:  Risks, benefits, and possible side effects of medications explained to patient and patient verbalizes understanding and agreement for treatment.    Counseling / Coordination of Care:  Patient's progress reviewed with nursing staff.  Medications, treatment progress and treatment plan reviewed with patient.  Supportive counseling provided to the patient.    Total floor/unit time spent today 25 minutes. Greater than 50% of total time was spent with the patient and / or family counseling and / or coordination of care. A description of the counseling / coordination of care: medication education, treatment plan, supportive therapy.

## 2024-06-07 NOTE — PLAN OF CARE
Problem: Depression  Goal: Refrain from harming self  Description: Interventions:  - Monitor patient closely, per order   - Supervise medication ingestion, monitor effects and side effects   Outcome: Progressing  Goal: Refrain from isolation  Description: Interventions:  - Develop a trusting relationship   - Encourage socialization   Outcome: Progressing  Goal: Complete daily ADLs, including personal hygiene independently, as able  Description: Interventions:  - Observe, teach, and assist patient with ADLS  -  Monitor and promote a balance of rest/activity, with adequate nutrition and elimination   Outcome: Progressing     Problem: Anxiety  Goal: Anxiety is at manageable level  Description: Interventions:  - Assess and monitor patient's anxiety level.   - Monitor for signs and symptoms (heart palpitations, chest pain, shortness of breath, headaches, nausea, feeling jumpy, restlessness, irritable, apprehensive).   - Collaborate with interdisciplinary team and initiate plan and interventions as ordered.  - [place] patient to unit/surroundings  - Explain treatment plan  - Encourage participation in care  - Encourage verbalization of concerns/fears  - Identify coping mechanisms  - Assist in developing anxiety-reducing skills  - Administer/offer alternative therapies  - Limit or eliminate stimulants  Outcome: Not Progressing

## 2024-06-07 NOTE — PROGRESS NOTES
06/07/24 0926   Team Meeting   Meeting Type Daily Rounds   Team Members Present   Team Members Present Physician;;Nurse   Physician Team Member Lucero   [facility] Noa   Patient/Family Present   Patient Present No   Patient's Family Present No     Pt is visible on the unit. Pt is medication and meal compliant. Pt is compliant and cooperative. Pt denied SI/HI/AVH. Pt's discharge is to be determined.

## 2024-06-07 NOTE — NURSING NOTE
Pt visible throughout evening, social with select peers. Pt is pleasant and denies any current complaints. Pt denies SI/HI/AH/VH.

## 2024-06-07 NOTE — NURSING NOTE
Patient has been visible on the unit, makes his needs known.  Patient is med/meal compliant, pleasant, and cooperative.  He denies SI/Hi and A/V hallucinations.

## 2024-06-08 PROCEDURE — 99232 SBSQ HOSP IP/OBS MODERATE 35: CPT | Performed by: STUDENT IN AN ORGANIZED HEALTH CARE EDUCATION/TRAINING PROGRAM

## 2024-06-08 RX ADMIN — LEVOTHYROXINE SODIUM 25 MCG: 25 TABLET ORAL at 05:59

## 2024-06-08 RX ADMIN — SERTRALINE HYDROCHLORIDE 125 MG: 100 TABLET ORAL at 09:38

## 2024-06-08 NOTE — NURSING NOTE
"Patient reports \"feeling happy for the first time in a long time.  I got a diagnosis of Autism and I can get more help.\"  Patient denies SI/HI and A/V hallucinations.  He is pleasant, cooperative, and med/meal compliant.  "

## 2024-06-08 NOTE — PROGRESS NOTES
"Progress Note - Behavioral Health   Franco Roberson 39 y.o. male MRN: [id number]  Unit/Bed#: [facility] 352-02 Encounter: [id number]    All documentation, nursing notes, labs, and vitals reviewed.  The patient's medication reconciliation chart was also analyzed for medication adherence.  I personally evaluated Franco Roberson and discussed current care with treatment team.    No acute events overnight. Franco was pleasant upon approach. During our last assessment in May, I placed an order for neuropsych testing to confirm my suspected diagnosis of ASD. Franco completed neuropsych testing which solidified the diagnosis. I informed Franco of this today, who shared that he was told he \"didn't have it\". I showed Franco documentation that confirms the diagnosis, to which he said, \"what a relief\". We processed ways this diagnosis explains a lot of his behaviors and challenges with emotional reciprocity and social connectivity. Acutely, Franco reports improvement in his depression and anxiety control \"since yesterday\". Over the last 24 hours, Franco shares that \"the Zoloft is starting to work\". He reports less tension, less feelings of being on-edge, and \"no more physical symptoms like my chest getting tight\". His sleep and appetite are stable. Franco denies SI/HI today. His energy and motivation are erratic. He is without crying spells or feelings of hopelessness at the moment. In fact, he is more optimistic given this diagnosis and plan for placement. Franco denies recent panic attacks. He is without adverse medication side effects. During today's examination, Franco does not exhibit objective evidence of patrick/hypomania or psychosis. Franco is not currently irritable, grandiose, labile, or pathologically euphoric. Franco is without perceptual disturbances, such as A/V hallucinations, paranoia, ideas of reference, or delusional beliefs. We processed future-goals for Franco today and discussion was held regarding his \"emotional house\". " We discussed life's storm and ways to prevent the fragmentation of the foundation of his emotional house. He was receptive. We also discussed regression and this was normalized. Franco offers no further concerns.     Mental Status Evaluation:    Appearance:  marginal hygiene, looks older than stated age, overweight   Behavior:  pleasant, cooperative, limited eye contact   Speech:  slow, scant, soft   Mood:  less anxious, less depressed   Affect:  slightly brighter, less constricted   Thought Process:  organized, logical   Associations: intact associations   Thought Content:  no overt delusions   Perceptual Disturbances: no auditory hallucinations, no visual hallucinations   Risk Potential: Suicidal ideation - None at present  Homicidal ideation - None at present  Potential for aggression - No   Sensorium:  oriented to person, place, and time/date   Memory:  recent and remote memory grossly intact   Consciousness:  alert and awake    Attention: attention span and concentration are age appropriate   Insight:  fair   Judgment: fair   Gait/Station: normal gait/station   Motor Activity: no abnormal movements       Assessment:     Principal Problem:    Major depressive disorder without psychotic features  Active Problems:    Unspecified depressive disorder (HCC)    Medical clearance for psychiatric admission    Hyperlipidemia    Vitamin D deficiency    B12 deficiency    Hypothyroidism    Rule Out Autism spectrum disorder      Plan/Recommended Treatment:     - Continue with pharmacotherapy, group therapy, milieu therapy and occupational therapy.    - Risks/benefits/alternatives to treatment discussed and Franco Roberson continues to verbalize understanding   - No psychopharmacologic changes necessary at this juncture, continue scheduled psychotropic agents at current doses (see below)   - Will consider further optimization of psychotropic medication regimen as hospital course progresses   - Continue to assess for adverse  medication side effects.  - Medical management as per SLIM recs  - Encourage Franco Roberson to participate in nonverbal forms of therapy including journaling and art/music therapy  - Continue precautionary Q7-minute safety checks.  - Continue to engage case management/SW to assist with collateral information, discharge planning, and the implementation of an individualized, patient-centered plan of care.  - The patient will be maintained on the following medications:    Current Facility-Administered Medications   Medication Dose Route Frequency Provider Last Rate    acetaminophen  650 mg Oral Q6H PRN Basilio Brown MD      acetaminophen  650 mg Oral Q4H PRN Basilio Brown MD      acetaminophen  975 mg Oral Q6H PRN Basilio Brown MD      aluminum-magnesium hydroxide-simethicone  30 mL Oral Q4H PRN Basilio Brown MD      Artificial Tears  1 drop Both Eyes Q3H PRN Basilio Brown MD      atorvastatin  10 mg Oral Daily With Dinner WALLY Baptiste      benztropine  1 mg Intramuscular Q4H PRN Max 6/day Basilio Brown MD      benztropine  1 mg Oral Q4H PRN Max 6/day Basilio Brown MD      cyanocobalamin  1,000 mcg Oral Daily WALLY Baptiste      cyanocobalamin  1,000 mcg Intramuscular Once WALLY Baptiste      hydrOXYzine HCL  50 mg Oral Q6H PRN Max 4/day Basilio Brown MD      Or    diphenhydrAMINE  50 mg Intramuscular Q6H PRN Basilio Brown MD      ergocalciferol  50,000 Units Oral Weekly WALLY Baptiste      hydrOXYzine HCL  100 mg Oral Q6H PRN Max 4/day Basilio Brown MD      Or    LORazepam  2 mg Intramuscular Q6H PRN Basilio Brown MD      hydrOXYzine HCL  25 mg Oral Q6H PRN Max 4/day Basilio Brown MD      levothyroxine  25 mcg Oral Early Morning WALLY Baptiste      OLANZapine  5 mg Oral Q4H PRN Max 3/day Basilio Brown MD      Or    OLANZapine  2.5 mg Intramuscular Q4H PRN Max 3/day Basilio  Jose Boss MD      OLANZapine  5 mg Oral Q3H PRN Max 3/day Basilio Brown MD      Or    OLANZapine  5 mg Intramuscular Q3H PRN Max 3/day Basilio Brown MD      OLANZapine  2.5 mg Oral Q4H PRN Max 6/day Basilio Brown MD      polyethylene glycol  17 g Oral Daily PRN Basilio Brown MD      propranolol  10 mg Oral Q8H PRN Basilio Brown MD      senna-docusate sodium  1 tablet Oral Daily PRN Basilio Brown MD      sertraline  125 mg Oral Daily WALLY Gomez      traZODone  50 mg Oral HS PRN Basilio Brown MD

## 2024-06-08 NOTE — PLAN OF CARE
Problem: Ineffective Coping  Goal: Identifies ineffective coping skills  Outcome: Progressing  Goal: Identifies healthy coping skills  Outcome: Progressing  Goal: Demonstrates healthy coping skills  Outcome: Progressing  Goal: Participates in unit activities  Description: Interventions:  - Provide therapeutic environment   - Provide required programming   - Redirect inappropriate behaviors   Outcome: Progressing     Problem: Risk for Self Injury/Neglect  Goal: Treatment Goal: Remain safe during length of stay, learn and adopt new coping skills, and be free of self-injurious ideation, impulses and acts at the time of discharge  Outcome: Progressing     Problem: Depression  Goal: Treatment Goal: Demonstrate behavioral control of depressive symptoms, verbalize feelings of improved mood/affect, and adopt new coping skills prior to discharge  Outcome: Progressing  Goal: Verbalize thoughts and feelings  Description: Interventions:  - Assess and re-assess patient's level of risk   - Engage patient in 1:1 interactions, daily, for a minimum of 15 minutes   - Encourage patient to express feelings, fears, frustrations, hopes   Outcome: Progressing  Goal: Refrain from harming self  Description: Interventions:  - Monitor patient closely, per order   - Supervise medication ingestion, monitor effects and side effects   Outcome: Progressing  Goal: Refrain from isolation  Description: Interventions:  - Develop a trusting relationship   - Encourage socialization   Outcome: Progressing  Goal: Refrain from self-neglect  Outcome: Progressing  Goal: Attend and participate in unit activities, including therapeutic, recreational, and educational groups  Description: Interventions:  - Provide therapeutic and educational activities daily, encourage attendance and participation, and document same in the medical record   Outcome: Progressing  Goal: Complete daily ADLs, including personal hygiene independently, as able  Description:  "[facility] Hospital Medicine  Progress Note    Patient Name: Williams Bland  MRN: [id number]  Patient Class: IP- Inpatient   Admission Date: 9/14/2017  Length of Stay: 3 days  Attending Physician: Susy Vincent MD  Primary Care Provider: Ronan Caldwell MD        Subjective:     Principal Problem:Acute on chronic systolic heart failure    HPI:  66 yo AAM with PMH of uncontrolled IDDM (A1c >14 7/18/17), HFrEF (EF 35 3/2017), ESRD (HD MWF), CAD s/p CABG/stents, previous CVA (2013) and right BKA presented to ED c/o increased fluid retention x 3 weeks. Patient states that he told his HD center about the swelling and was told that they would take more fluid off. He states that it is not getting better and that the swelling is now in his stomach stating, "my stomach is rising like bread."  He states that he last had HD yesterday. He states that he takes all of his medication as prescribed but ran out of lasix about 3 days ago.  He denies chest pain, palpations, sob, n/v/d/c, abdominal pain,.dysuria.  His only complaint is leg and abdominal swelling.      Patient brought medication bag with him to ED and when looking through there were some duplicate medications and lasix 80mg was present in the bag. Patient may be taking wrong medications or unaware of amount/dose taken.     Per ED note, patient had increased lethargy/sleepyness, confusion and sob prior to ED arrival. Home health nurse was concerned that patient may have taken medications incorrectly including narcotic pain medications. He was given Narcan by EMS with improvement to mentation and breathing.  On exam, patient was oriented x4,  awake for all of exam, answering appropriately and breathing comfortably on room air.           Hospital Course:  Pt was seen and examined. He says his SOB has improved. He was complaining of pain in his amputated right knee and later he was complaining of itchiness which had been resolved when seen this AM. "         Review of Systems   Constitutional: Negative for activity change, chills, fatigue and fever.   HENT: Negative for congestion, rhinorrhea and sore throat.    Eyes: Negative for visual disturbance.   Respiratory: Negative for cough, chest tightness and shortness of breath.    Cardiovascular: Negative for chest pain, palpitations and leg swelling.   Gastrointestinal: Positive for abdominal distention. Negative for abdominal pain, constipation, diarrhea, nausea and vomiting.   Genitourinary: Negative for dysuria and hematuria.   Musculoskeletal: Negative for arthralgias and myalgias.   Skin: Negative for rash.   Neurological: Negative for dizziness, light-headedness and headaches.   Psychiatric/Behavioral: Negative for agitation. The patient is not nervous/anxious.      Objective:     Vital Signs (Most Recent):  Temp: 98.4 °F (36.9 °C) (09/16/17 1245)  Pulse: (!) 59 (09/16/17 1339)  Resp: 19 (09/16/17 1339)  BP: 101/64 (09/16/17 1245)  SpO2: 98 % (09/16/17 1629) Vital Signs (24h Range):  Temp:  [98 °F (36.7 °C)-98.5 °F (36.9 °C)] 98.4 °F (36.9 °C)  Pulse:  [56-85] 59  Resp:  [16-20] 19  SpO2:  [95 %-98 %] 98 %  BP: ()/(46-66) 101/64     Weight: 77.6 kg (171 lb 1.2 oz)  Body mass index is 23.86 kg/m².    Intake/Output Summary (Last 24 hours) at 09/16/17 1808  Last data filed at 09/16/17 1245   Gross per 24 hour   Intake             1170 ml   Output             1500 ml   Net             -330 ml      Physical Exam   Constitutional: He is oriented to person, place, and time. He appears well-developed and well-nourished. No distress.   HENT:   Head: Normocephalic and atraumatic.   Mouth/Throat: Oropharynx is clear and moist.   Eyes: Conjunctivae and EOM are normal. Pupils are equal, round, and reactive to light.   Neck: Normal range of motion. Neck supple.   Cardiovascular: Normal rate, regular rhythm, normal heart sounds and intact distal pulses.    Pulmonary/Chest: Effort normal and breath sounds normal.  Interventions:  - Observe, teach, and assist patient with ADLS  -  Monitor and promote a balance of rest/activity, with adequate nutrition and elimination   Outcome: Progressing     Problem: Anxiety  Goal: Anxiety is at manageable level  Description: Interventions:  - Assess and monitor patient's anxiety level.   - Monitor for signs and symptoms (heart palpitations, chest pain, shortness of breath, headaches, nausea, feeling jumpy, restlessness, irritable, apprehensive).   - Collaborate with interdisciplinary team and initiate plan and interventions as ordered.  - [place] patient to unit/surroundings  - Explain treatment plan  - Encourage participation in care  - Encourage verbalization of concerns/fears  - Identify coping mechanisms  - Assist in developing anxiety-reducing skills  - Administer/offer alternative therapies  - Limit or eliminate stimulants  Outcome: Progressing     Problem: DISCHARGE PLANNING - CARE MANAGEMENT  Goal: Discharge to post-acute care or home with appropriate resources  Description: INTERVENTIONS:  - Conduct assessment to determine patient/family and health care team treatment goals, and need for post-acute services based on payer coverage, community resources, and patient preferences, and barriers to discharge  - Address psychosocial, clinical, and financial barriers to discharge as identified in assessment in conjunction with the patient/family and health care team  - Arrange appropriate level of post-acute services according to patient’s   needs and preference and payer coverage in collaboration with the physician and health care team  - Communicate with and update the patient/family, physician, and health care team regarding progress on the discharge plan  - Arrange appropriate transportation to post-acute venues  Outcome: Progressing     Problem: Knowledge Deficit  Goal: Patient/family/caregiver demonstrates understanding of disease process, treatment plan, medications, and discharge    Abdominal: Soft. Bowel sounds are normal. He exhibits distension. There is no tenderness. There is no guarding.   abdominal hernia   Musculoskeletal: Normal range of motion. He exhibits deformity (right bka). He exhibits no edema (2+ lower extremity) or tenderness.   Neurological: He is alert and oriented to person, place, and time. He has normal reflexes. No cranial nerve deficit.   Skin: Skin is warm and dry. He is not diaphoretic.   Psychiatric: He has a normal mood and affect. His behavior is normal. Judgment and thought content normal.       Significant Labs:   A1C:   Recent Labs  Lab 07/18/17  0501   HGBA1C >14.0*     Blood Culture: No results for input(s): LABBLOO in the last 48 hours.  CBC:   Recent Labs  Lab 09/15/17  0514 09/16/17  0528   WBC 6.39 5.34   HGB 11.6* 11.7*   HCT 35.0* 36.1*   * 131*     CMP:   Recent Labs  Lab 09/15/17  0514 09/15/17  1548 09/16/17  0941   * 135* 132*   K 5.7* 4.7 5.1   CL 94* 99 98   CO2 24 26 24   * 289* 239*   BUN 59* 37* 48*   CREATININE 5.7* 4.1* 5.0*   CALCIUM 8.4* 8.6* 8.4*   PROT 5.9* 5.8* 5.9*   ALBUMIN 2.9* 3.1* 3.2*   BILITOT 0.6 0.7 0.6   ALKPHOS 318* 310* 308*   AST 70* 63* 44*   ALT 88* 77* 70*   ANIONGAP 10 10 10   EGFRNONAA 10* 14* 11*     Lactic Acid: No results for input(s): LACTATE in the last 48 hours.  Troponin:   Recent Labs  Lab 09/14/17  2341 09/15/17  0514   TROPONINI 1.109* 0.950*       Significant Imaging: I have reviewed all pertinent imaging results/findings within the past 24 hours.    Assessment/Plan:      * Acute on chronic systolic heart failure    - Echo done yesterday showed EF of 20%  - Will consult Cardiology, appreciate recs   - CXR: blunting of the right costophrenic angle with pleural fluid versus pleural thickening and likely a small amount of pleural fluid, consistent with increased volume.   - BNP > 4900  - patient states out of home lasix 80 mg Qday x 2 days but lasix was present in his medication bag, unclear  instructions  Description: Complete learning assessment and assess knowledge base.  Interventions:  - Provide teaching at level of understanding  - Provide teaching via preferred learning methods  Outcome: Progressing      if patient actually taking medications as prescribed.   - IV lasix 80mg given in ED  Continuing lasix 80mg IV QID  - strict in/out  following UOP          Swelling              Essential hypertension    BP stable  Home medications: coreg 25 bid, amlodipine 5 qday, lisinopril 20 qday, imdur   Continue home coreg, withholding some home meds due to low normal bp        ESRD on hemodialysis    - HD MWF at [facility]  - Per patient last HD 2 weeks ago  - Nephro consulted, recs appreciated, getting HD here  - BUN/Cr pending this AM            Serum potassium elevated    - In ED, K 6.2, treated with albuterol   - K pending this AM, but stable yesterday at 5.4            Anemia    - H/H stable, patient is ESRD  - Stable. 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, with long-term current use of insulin    - last a1c >14 (7/2017)  - home medications levemir 10 units Qhs, novolog 5 units TID  - currently on levemir 5 units Qhs and SSI with meals   - POCT glucose TID          Elevated troponin    - trop 0.941  - EKG consistent with previous  - Cardiac enzymes trended down  - On aspirin, plavix, holding home lipitor 2/2 to mild elevation in AST/ALT            CAD (coronary artery disease)    - s/p stent and CABG  - continuing plavix, asa  - Trop elevated at admit, 0.941, trended down            VTE Risk Mitigation         Ordered     Medium Risk of VTE  Once      09/14/17 2153     Place sequential compression device  Until discontinued      09/14/17 2153              Erasmo Barboza MD  Department of Hospital Medicine   [facility]

## 2024-06-08 NOTE — NURSING NOTE
"Pt visible on unit, social at times, able to endorse needs and is communicative. Pt states, \"I actually feel better today,\" when asked. Pt appears brighter at times and more comfortable on unit than last evening with nurse, though remains constricted. Pt denies SI/HI/AH/VH and unmet needs at this time.   "

## 2024-06-09 PROCEDURE — 99232 SBSQ HOSP IP/OBS MODERATE 35: CPT | Performed by: STUDENT IN AN ORGANIZED HEALTH CARE EDUCATION/TRAINING PROGRAM

## 2024-06-09 RX ADMIN — LEVOTHYROXINE SODIUM 25 MCG: 25 TABLET ORAL at 05:34

## 2024-06-09 RX ADMIN — SERTRALINE HYDROCHLORIDE 125 MG: 100 TABLET ORAL at 10:30

## 2024-06-09 NOTE — NURSING NOTE
Pt visible, social, appropriate, pleasant, calm. Pt denies SI/HI/AH/VH and is cooperative with treatment. Pt denies unmet needs.

## 2024-06-09 NOTE — NURSING NOTE
Patient has been visible on the unit and he attended group.  Patient denies SI/HI and A/V hallucinations.  Patient has been med/meal compliant, calm, cooperative, and pleasant.

## 2024-06-09 NOTE — PROGRESS NOTES
"Progress Note - Behavioral Health   Franco Roberson 39 y.o. male MRN: [id number]  Unit/Bed#: [facility] 352-02 Encounter: [id number]    All documentation, nursing notes, labs, and vitals reviewed.  The patient's medication reconciliation chart was also analyzed for medication adherence.  I personally evaluated Franco Roberson and discussed current care with treatment team.    No acute events overnight. Franco is pleasant and cooperative on approach. He reports overall improvement in mood in the last 24 hours as we once again processed his diagnosis of ASD. Franco's sleep was disrupted last evening secondary to his roommate. His appetite is stable. He vehemently denies SI/HI today. His energy and motivation are \"fine\". Franco describes his mood as \"pretty good\". He is \"more hopeful\" and less anhedonic. No crying spells or feelings of apathy over the last 24 hours. Franco is not restless, tense, or on-edge today. No recent panic symptoms or attacks. During today's examination, Franco does not exhibit objective evidence of patrick/hypomania or psychosis. Franco rates his overall state of MH at the moment as \"7-8/10\" (10 being best, 0 being worst he ever felt). He is eager to find placement and engage with community-based resources for those with ASD. Franco continues to endorse benefit from Zoloft and denies adverse medication side effects. Franco offers no further concerns.     Mental Status Evaluation:    Appearance:  age appropriate, casually dressed, marginal hygiene, looks older than stated age   Behavior:  pleasant, cooperative, calm   Speech:  normal rate, normal volume, normal pitch   Mood:  improved, less anxious, less depressed   Affect:  brighter, less constricted   Thought Process:  organized, logical, coherent, goal directed   Associations: intact associations   Thought Content:  no overt delusions   Perceptual Disturbances: no auditory hallucinations, no visual hallucinations   Risk Potential: Suicidal ideation - None at " present  Homicidal ideation - None at present  Potential for aggression - No   Sensorium:  oriented to person, place, and time/date   Memory:  recent and remote memory grossly intact   Consciousness:  alert and awake    Attention: attention span and concentration are age appropriate   Insight:  good   Judgment: good   Gait/Station: normal gait/station   Motor Activity: no abnormal movements       Assessment:     Principal Problem:    Major depressive disorder without psychotic features  Active Problems:    Unspecified depressive disorder (HCC)    Medical clearance for psychiatric admission    Hyperlipidemia    Vitamin D deficiency    B12 deficiency    Hypothyroidism    Rule Out Autism spectrum disorder      Plan/Recommended Treatment:     - Continue with pharmacotherapy, group therapy, milieu therapy and occupational therapy.    - Risks/benefits/alternatives to treatment discussed and Franco Roberson continues to verbalize understanding   - No psychopharmacologic changes necessary at this juncture, continue scheduled psychotropic agents at current doses (see below)   - Will consider further optimization of psychotropic medication regimen as hospital course progresses   - Continue to assess for adverse medication side effects.  - Medical management as per SLIM recs  - Encourage Franco Roberson to participate in nonverbal forms of therapy including journaling and art/music therapy  - Continue precautionary Q7-minute safety checks.  - Continue to engage case management/SW to assist with collateral information, discharge planning, and the implementation of an individualized, patient-centered plan of care.  - The patient will be maintained on the following medications:    Current Facility-Administered Medications   Medication Dose Route Frequency Provider Last Rate    acetaminophen  650 mg Oral Q6H PRN Basilio Brown MD      acetaminophen  650 mg Oral Q4H PRN Basilio Brown MD      acetaminophen  975 mg Oral Q6H PRN  Basilio Brown MD      aluminum-magnesium hydroxide-simethicone  30 mL Oral Q4H PRN Basilio Brown MD      Artificial Tears  1 drop Both Eyes Q3H PRN Basilio Brown MD      atorvastatin  10 mg Oral Daily With Dinner Laura WALLY Olmos      benztropine  1 mg Intramuscular Q4H PRN Max 6/day Basilio Brown MD      benztropine  1 mg Oral Q4H PRN Max 6/day Basilio Brown MD      cyanocobalamin  1,000 mcg Oral Daily Laura RomeWALLY Jacobson      cyanocobalamin  1,000 mcg Intramuscular Once Laura WALLY Olmos      hydrOXYzine HCL  50 mg Oral Q6H PRN Max 4/day Basilio Brown MD      Or    diphenhydrAMINE  50 mg Intramuscular Q6H PRN Basilio Brown MD      ergocalciferol  50,000 Units Oral Weekly WALLY Baptiste      hydrOXYzine HCL  100 mg Oral Q6H PRN Max 4/day Basilio Brown MD      Or    LORazepam  2 mg Intramuscular Q6H PRN Basilio Brown MD      hydrOXYzine HCL  25 mg Oral Q6H PRN Max 4/day Basilio Brown MD      levothyroxine  25 mcg Oral Early Morning WALLY Baptiste      OLANZapine  5 mg Oral Q4H PRN Max 3/day Basilio Brown MD      Or    OLANZapine  2.5 mg Intramuscular Q4H PRN Max 3/day Basilio Brown MD      OLANZapine  5 mg Oral Q3H PRN Max 3/day Basilio Brown MD      Or    OLANZapine  5 mg Intramuscular Q3H PRN Max 3/day Basilio Brown MD      OLANZapine  2.5 mg Oral Q4H PRN Max 6/day Basilio Brown MD      polyethylene glycol  17 g Oral Daily PRN Basilio Brown MD      propranolol  10 mg Oral Q8H PRN Basilio Brown MD      senna-docusate sodium  1 tablet Oral Daily PRN Basilio Brown MD      sertraline  125 mg Oral Daily WALLY Gomez      traZODone  50 mg Oral HS PRN Basilio Brown MD

## 2024-06-10 PROCEDURE — 99232 SBSQ HOSP IP/OBS MODERATE 35: CPT | Performed by: STUDENT IN AN ORGANIZED HEALTH CARE EDUCATION/TRAINING PROGRAM

## 2024-06-10 RX ADMIN — LEVOTHYROXINE SODIUM 25 MCG: 25 TABLET ORAL at 05:34

## 2024-06-10 RX ADMIN — SERTRALINE HYDROCHLORIDE 125 MG: 100 TABLET ORAL at 08:37

## 2024-06-10 NOTE — PLAN OF CARE
Problem: Ineffective Coping  Goal: Identifies ineffective coping skills  Outcome: Progressing  Goal: Identifies healthy coping skills  Outcome: Progressing  Goal: Demonstrates healthy coping skills  Outcome: Progressing  Goal: Participates in unit activities  Description: Interventions:  - Provide therapeutic environment   - Provide required programming   - Redirect inappropriate behaviors   Outcome: Progressing     Problem: Risk for Self Injury/Neglect  Goal: Treatment Goal: Remain safe during length of stay, learn and adopt new coping skills, and be free of self-injurious ideation, impulses and acts at the time of discharge  Outcome: Progressing     Problem: Depression  Goal: Treatment Goal: Demonstrate behavioral control of depressive symptoms, verbalize feelings of improved mood/affect, and adopt new coping skills prior to discharge  Outcome: Progressing  Goal: Verbalize thoughts and feelings  Description: Interventions:  - Assess and re-assess patient's level of risk   - Engage patient in 1:1 interactions, daily, for a minimum of 15 minutes   - Encourage patient to express feelings, fears, frustrations, hopes   Outcome: Progressing  Goal: Refrain from harming self  Description: Interventions:  - Monitor patient closely, per order   - Supervise medication ingestion, monitor effects and side effects   Outcome: Progressing  Goal: Refrain from isolation  Description: Interventions:  - Develop a trusting relationship   - Encourage socialization   Outcome: Progressing  Goal: Refrain from self-neglect  Outcome: Progressing  Goal: Attend and participate in unit activities, including therapeutic, recreational, and educational groups  Description: Interventions:  - Provide therapeutic and educational activities daily, encourage attendance and participation, and document same in the medical record   Outcome: Progressing  Goal: Complete daily ADLs, including personal hygiene independently, as able  Description:  Interventions:  - Observe, teach, and assist patient with ADLS  -  Monitor and promote a balance of rest/activity, with adequate nutrition and elimination   Outcome: Progressing     Problem: Anxiety  Goal: Anxiety is at manageable level  Description: Interventions:  - Assess and monitor patient's anxiety level.   - Monitor for signs and symptoms (heart palpitations, chest pain, shortness of breath, headaches, nausea, feeling jumpy, restlessness, irritable, apprehensive).   - Collaborate with interdisciplinary team and initiate plan and interventions as ordered.  - [place] patient to unit/surroundings  - Explain treatment plan  - Encourage participation in care  - Encourage verbalization of concerns/fears  - Identify coping mechanisms  - Assist in developing anxiety-reducing skills  - Administer/offer alternative therapies  - Limit or eliminate stimulants  Outcome: Progressing     Problem: DISCHARGE PLANNING - CARE MANAGEMENT  Goal: Discharge to post-acute care or home with appropriate resources  Description: INTERVENTIONS:  - Conduct assessment to determine patient/family and health care team treatment goals, and need for post-acute services based on payer coverage, community resources, and patient preferences, and barriers to discharge  - Address psychosocial, clinical, and financial barriers to discharge as identified in assessment in conjunction with the patient/family and health care team  - Arrange appropriate level of post-acute services according to patient’s   needs and preference and payer coverage in collaboration with the physician and health care team  - Communicate with and update the patient/family, physician, and health care team regarding progress on the discharge plan  - Arrange appropriate transportation to post-acute venues  Outcome: Progressing     Problem: Knowledge Deficit  Goal: Patient/family/caregiver demonstrates understanding of disease process, treatment plan, medications, and discharge  instructions  Description: Complete learning assessment and assess knowledge base.  Interventions:  - Provide teaching at level of understanding  - Provide teaching via preferred learning methods  Outcome: Progressing

## 2024-06-10 NOTE — PROGRESS NOTES
06/10/24 0982   Team Meeting   Meeting Type Daily Rounds   Team Members Present   Team Members Present Physician;;Nurse   Physician Team Member Amarilys   Nursing Team Member [facility] Management Team Member Noa   Patient/Family Present   Patient Present No   Patient's Family Present No     Pt is calm and cooperative. Pt denies SI/HI/AVH. Pt is medication and meal compliant. Pt's discharge is to be determined.

## 2024-06-10 NOTE — PROGRESS NOTES
"Progress Note - Behavioral Health   Franco Roberson 39 y.o. male MRN: [id number]  Unit/Bed#: [facility] 352-02 Encounter: [id number]    Subjective:   Per nursing report, patient has been doing well.  He has been calm and cooperative.  He remains medication compliant.  He has been without any acute behavioral issues.  Patient is awaiting placement. Patient is pleasant and cooperative with interview.  He reports that he is feeling well after the weekend.  He reports that his mood is good.  He denies any problems tolerating medications.  He denies any SI, HI, or AVH.  He denies questions or concerns at this time.    Behavior over the last 24 hours:  unchanged  Sleep: normal  Appetite: normal  Medication side effects: No  ROS: no complaints and all other systems are negative    Mental Status Evaluation:  Appearance:  dressed appropriately and adequately groomed   Behavior:  calm, pleasant, and cooperative   Speech:  soft and scant   Mood:  \"good\"   Affect:  constricted, slightly brighter   Thought Process:  linear and goal directed   Associations: concrete associations   Thought Content:  no overt delusions   Perceptual Disturbances: denies auditory or visual hallucinations when asked, does not appear responding to internal stimuli   Risk Potential: Suicidal ideation - None  Homicidal ideation - None  Potential for aggression - Not at present   Sensorium:  oriented to person, place, and time/date   Memory:  recent and remote memory grossly intact   Consciousness:  alert and awake   Attention/Concentration: attention span and concentration are age appropriate   Insight:  limited   Judgment: limited   Gait/Station: normal gait/station, normal balance   Motor Activity: no abnormal movements     Medications: all current active meds have been reviewed.  Current Facility-Administered Medications   Medication Dose Route Frequency Provider Last Rate    acetaminophen  650 mg Oral Q6H PRN Basilio Brown MD      acetaminophen  650 mg " Oral Q4H PRN Basilio Brown MD      acetaminophen  975 mg Oral Q6H PRN Basilio Brown MD      aluminum-magnesium hydroxide-simethicone  30 mL Oral Q4H PRN Basilio Brown MD      Artificial Tears  1 drop Both Eyes Q3H PRN Basilio Brown MD      atorvastatin  10 mg Oral Daily With Dinner WALLY Baptiste      benztropine  1 mg Intramuscular Q4H PRN Max 6/day Basilio Brown MD      benztropine  1 mg Oral Q4H PRN Max 6/day Basilio Brown MD      cyanocobalamin  1,000 mcg Oral Daily WALLY Baptiste      cyanocobalamin  1,000 mcg Intramuscular Once WALLY Baptiste      hydrOXYzine HCL  50 mg Oral Q6H PRN Max 4/day Basilio Brown MD      Or    diphenhydrAMINE  50 mg Intramuscular Q6H PRN Basilio Brown MD      ergocalciferol  50,000 Units Oral Weekly WALLY Baptiste      hydrOXYzine HCL  100 mg Oral Q6H PRN Max 4/day Basilio Brown MD      Or    LORazepam  2 mg Intramuscular Q6H PRN Basilio Brown MD      hydrOXYzine HCL  25 mg Oral Q6H PRN Max 4/day Basilio Brown MD      levothyroxine  25 mcg Oral Early Morning WALLY Baptiste      OLANZapine  5 mg Oral Q4H PRN Max 3/day Basilio Brown MD      Or    OLANZapine  2.5 mg Intramuscular Q4H PRN Max 3/day Basilio Brown MD      OLANZapine  5 mg Oral Q3H PRN Max 3/day Basilio Brown MD      Or    OLANZapine  5 mg Intramuscular Q3H PRN Max 3/day Basilio Brown MD      OLANZapine  2.5 mg Oral Q4H PRN Max 6/day Basilio Brown MD      polyethylene glycol  17 g Oral Daily PRN Basilio Brown MD      propranolol  10 mg Oral Q8H PRN Basilio Brown MD      senna-docusate sodium  1 tablet Oral Daily PRN Basilio Brown MD      sertraline  125 mg Oral Daily WALLY Gomez      traZODone  50 mg Oral HS PRN Basilio Brown MD         Labs: I have personally reviewed all pertinent laboratory/tests results  Most Recent  Labs:   Lab Results   Component Value Date    WBC 5.42 05/15/2024    RBC 5.05 05/15/2024    HGB 16.1 05/15/2024    HCT 49.4 (H) 05/15/2024     05/15/2024    RDW 12.6 05/15/2024    NEUTROABS 2.83 05/15/2024    SODIUM 142 05/15/2024    K 4.1 05/15/2024     05/15/2024    CO2 28 05/15/2024    BUN 13 05/15/2024    CREATININE 0.97 05/15/2024    GLUC 89 05/15/2024    CALCIUM 9.3 05/15/2024    AST 21 05/15/2024    ALT 19 05/15/2024    ALKPHOS 66 05/15/2024    TP 7.6 05/15/2024    ALB 4.3 05/15/2024    TBILI 0.69 05/15/2024    CHOLESTEROL 191 05/15/2024    HDL 44 05/15/2024    TRIG 116 05/15/2024    LDLCALC 124 (H) 05/15/2024    NONHDLC 147 05/15/2024    [id number] 5.162 (H) 05/15/2024    FREET4 0.60 (L) 05/15/2024    SYPHILISAB Non-reactive 05/15/2024         Assessment & Plan   Principal Problem:    Major depressive disorder without psychotic features  Active Problems:    Unspecified depressive disorder (HCC)    Medical clearance for psychiatric admission    Hyperlipidemia    Vitamin D deficiency    B12 deficiency    Hypothyroidism    Rule Out Autism spectrum disorder    Recommended Treatment:   Continue Zoloft 125 mg daily.  Continue all other medications at current doses as above.  Encourage group therapy, milieu therapy and occupational therapy  All current active medications have been reviewed  Encourage group therapy, milieu therapy and occupational therapy  Behavioral Health checks every 7 minutes  Await placement    ----------------------------------------    Progress Toward Goals: progressing    Risks / Benefits of Treatment:    Risks, benefits, and possible side effects of medications explained to patient and patient verbalizes understanding and agreement for treatment.    Counseling / Coordination of Care:    Patient's progress discussed with staff in treatment team meeting.  Medications, treatment progress and treatment plan reviewed with patient.    Basilio Panda MD 06/10/24

## 2024-06-10 NOTE — NURSING NOTE
Pt visible, calm, selectively social on unit, pleasant. Pt endorses having a good day and denies SI/HI/AH/VH or unmet needs.

## 2024-06-10 NOTE — NURSING NOTE
Pt is calm and cooperative upon approach, visible at times. Pt reported improved sleep. Pt is psych medication compliant. Denies SI, HI, AH, and VH. Denies any needs at this time.

## 2024-06-10 NOTE — PROGRESS NOTES
06/10/24 0930 06/10/24 1100 06/10/24 1400   Activity/Group Checklist   Group Community meeting [facility]  (coping skill exploration)   Attendance Attended Attended Did not attend   Attendance Duration (min) 31-45 46-60  --    Interactions Interacted appropriately Did not interact  --    Affect/Mood Appropriate Calm  --    Goals Achieved Identified feelings;Discussed coping strategies;Identified relapse prevention strategies;Able to listen to others;Able to engage in interactions;Able to self-disclose Able to listen to others  --

## 2024-06-11 PROCEDURE — 99232 SBSQ HOSP IP/OBS MODERATE 35: CPT | Performed by: STUDENT IN AN ORGANIZED HEALTH CARE EDUCATION/TRAINING PROGRAM

## 2024-06-11 RX ADMIN — LEVOTHYROXINE SODIUM 25 MCG: 25 TABLET ORAL at 09:16

## 2024-06-11 RX ADMIN — SERTRALINE HYDROCHLORIDE 125 MG: 100 TABLET ORAL at 08:50

## 2024-06-11 NOTE — PROGRESS NOTES
06/11/24 0930 06/11/24 1400   Activity/Group Checklist   Group Community meeting Life Skills  (relapse prevention)   Attendance Attended Attended   Attendance Duration (min) 31-45 46-60   Interactions Interacted appropriately Interacted appropriately   Affect/Mood Appropriate Appropriate   Goals Achieved Identified feelings;Discussed coping strategies;Able to listen to others;Able to engage in interactions;Able to self-disclose Identified triggers;Identified relapse prevention strategies;Discussed coping strategies;Able to listen to others;Able to engage in interactions        None

## 2024-06-11 NOTE — SOCIAL WORK
Cm briefly checked in with Pt, pt was pleasant observed smiling. Pt reported he has been social with peers which is unusual for him. Pt and Cm discussed discharge.    Cm received push back from [facility] regarding Pt's Dx. CM discussed this with Attending.

## 2024-06-11 NOTE — PROGRESS NOTES
"Progress Note - Behavioral Health   Franco Roberson 39 y.o. male MRN: [id number]  Unit/Bed#: [facility] 352-02 Encounter: [id number]    Subjective:   Per nursing report, patient has been doing well.  He remains pleasant and cooperative on the unit.  No acute behavioral issues overnight. Patient is bright and pleasant on approach.  He has been social with peers and attends groups.  He reports that his mood is good today.  He denies any problems tolerating medications.  He denies any SI, HI, or AVH.  He is awaiting placement and denies any questions or concerns at this time.    Behavior over the last 24 hours:  unchanged  Sleep: normal  Appetite: normal  Medication side effects: No  ROS: no complaints and all other systems are negative    Mental Status Evaluation:  Appearance:  dressed appropriately and adequately groomed   Behavior:  calm, pleasant, cooperative, and guarded   Speech:  soft and scant   Mood:  \"good\"   Affect:  constricted   Thought Process:  linear and goal directed   Associations: concrete associations   Thought Content:  no overt delusions   Perceptual Disturbances: denies auditory or visual hallucinations when asked, does not appear responding to internal stimuli   Risk Potential: Suicidal ideation - None  Homicidal ideation - None  Potential for aggression - Not at present   Sensorium:  oriented to person, place, and time/date   Memory:  recent and remote memory grossly intact   Consciousness:  alert and awake   Attention/Concentration: attention span and concentration are age appropriate   Insight:  limited   Judgment: limited   Gait/Station: normal gait/station, normal balance   Motor Activity: no abnormal movements     Medications: all current active meds have been reviewed.  Current Facility-Administered Medications   Medication Dose Route Frequency Provider Last Rate    acetaminophen  650 mg Oral Q6H PRN Basilio Brown MD      acetaminophen  650 mg Oral Q4H PRN Basilio Brown MD      " acetaminophen  975 mg Oral Q6H PRN Basilio Brown MD      aluminum-magnesium hydroxide-simethicone  30 mL Oral Q4H PRN Basilio Brown MD      Artificial Tears  1 drop Both Eyes Q3H PRN Basilio Brown MD      atorvastatin  10 mg Oral Daily With Dinner WALLY Baptiste      benztropine  1 mg Intramuscular Q4H PRN Max 6/day Basilio Brown MD      benztropine  1 mg Oral Q4H PRN Max 6/day Basilio Brown MD      cyanocobalamin  1,000 mcg Oral Daily Laura WALLY Olmos      cyanocobalamin  1,000 mcg Intramuscular Once Laura WALLY Olmos      hydrOXYzine HCL  50 mg Oral Q6H PRN Max 4/day Basilio Brown MD      Or    diphenhydrAMINE  50 mg Intramuscular Q6H PRN Basilio Brown MD      ergocalciferol  50,000 Units Oral Weekly WALLY Baptiste      hydrOXYzine HCL  100 mg Oral Q6H PRN Max 4/day Basilio Brown MD      Or    LORazepam  2 mg Intramuscular Q6H PRN Basilio Brown MD      hydrOXYzine HCL  25 mg Oral Q6H PRN Max 4/day Basilio Brown MD      levothyroxine  25 mcg Oral Early Morning WALLY Baptiste      OLANZapine  5 mg Oral Q4H PRN Max 3/day Basilio Brown MD      Or    OLANZapine  2.5 mg Intramuscular Q4H PRN Max 3/day Basilio Brown MD      OLANZapine  5 mg Oral Q3H PRN Max 3/day Basilio Brown MD      Or    OLANZapine  5 mg Intramuscular Q3H PRN Max 3/day Basilio Brown MD      OLANZapine  2.5 mg Oral Q4H PRN Max 6/day Basilio rBown MD      polyethylene glycol  17 g Oral Daily PRN Basilio Brown MD      propranolol  10 mg Oral Q8H PRN Basilio Brown MD      senna-docusate sodium  1 tablet Oral Daily PRN Basilio Brown MD      sertraline  125 mg Oral Daily Rachana Salima [place], CRNP      traZODone  50 mg Oral HS PRN Basilio Brown MD         Labs: I have personally reviewed all pertinent laboratory/tests results  Most Recent Labs:   Lab Results   Component Value Date     WBC 5.42 05/15/2024    RBC 5.05 05/15/2024    HGB 16.1 05/15/2024    HCT 49.4 (H) 05/15/2024     05/15/2024    RDW 12.6 05/15/2024    NEUTROABS 2.83 05/15/2024    SODIUM 142 05/15/2024    K 4.1 05/15/2024     05/15/2024    CO2 28 05/15/2024    BUN 13 05/15/2024    CREATININE 0.97 05/15/2024    GLUC 89 05/15/2024    CALCIUM 9.3 05/15/2024    AST 21 05/15/2024    ALT 19 05/15/2024    ALKPHOS 66 05/15/2024    TP 7.6 05/15/2024    ALB 4.3 05/15/2024    TBILI 0.69 05/15/2024    CHOLESTEROL 191 05/15/2024    HDL 44 05/15/2024    TRIG 116 05/15/2024    LDLCALC 124 (H) 05/15/2024    NONHDLC 147 05/15/2024    [id number] 5.162 (H) 05/15/2024    FREET4 0.60 (L) 05/15/2024    SYPHILISAB Non-reactive 05/15/2024         Assessment & Plan   Principal Problem:    Major depressive disorder without psychotic features  Active Problems:    Unspecified depressive disorder (HCC)    Medical clearance for psychiatric admission    Hyperlipidemia    Vitamin D deficiency    B12 deficiency    Hypothyroidism    Rule Out Autism spectrum disorder    Recommended Treatment:   Continue Zoloft 125 mg daily.  Continue all other medications at current doses as above.  Encourage group therapy, milieu therapy and occupational therapy  All current active medications have been reviewed  Encourage group therapy, milieu therapy and occupational therapy  Behavioral Health checks every 7 minutes    ----------------------------------------    Progress Toward Goals: progressing    Risks / Benefits of Treatment:    Risks, benefits, and possible side effects of medications explained to patient and patient verbalizes understanding and agreement for treatment.    Counseling / Coordination of Care:    Patient's progress discussed with staff in treatment team meeting.  Medications, treatment progress and treatment plan reviewed with patient.    Basilio Panda MD 06/11/24

## 2024-06-11 NOTE — PROGRESS NOTES
06/11/24 0811   Team Meeting   Meeting Type Daily Rounds   Team Members Present   Team Members Present Physician;;Nurse   Physician Team Member Amarilys   Nursing Team Member [facility] Management Team Member Noa   Patient/Family Present   Patient Present No   Patient's Family Present No     Pt is quiet and cooperative. Pt is medication and meal compliant. Pt's discharge is to be determined.

## 2024-06-11 NOTE — PLAN OF CARE
Problem: Ineffective Coping  Goal: Identifies ineffective coping skills  Outcome: Progressing  Goal: Identifies healthy coping skills  Outcome: Progressing  Goal: Demonstrates healthy coping skills  Outcome: Progressing  Goal: Participates in unit activities  Description: Interventions:  - Provide therapeutic environment   - Provide required programming   - Redirect inappropriate behaviors   Outcome: Progressing     Problem: Risk for Self Injury/Neglect  Goal: Treatment Goal: Remain safe during length of stay, learn and adopt new coping skills, and be free of self-injurious ideation, impulses and acts at the time of discharge  Outcome: Progressing     Problem: Depression  Goal: Treatment Goal: Demonstrate behavioral control of depressive symptoms, verbalize feelings of improved mood/affect, and adopt new coping skills prior to discharge  Outcome: Progressing  Goal: Verbalize thoughts and feelings  Description: Interventions:  - Assess and re-assess patient's level of risk   - Engage patient in 1:1 interactions, daily, for a minimum of 15 minutes   - Encourage patient to express feelings, fears, frustrations, hopes   Outcome: Progressing  Goal: Refrain from harming self  Description: Interventions:  - Monitor patient closely, per order   - Supervise medication ingestion, monitor effects and side effects   Outcome: Progressing  Goal: Refrain from isolation  Description: Interventions:  - Develop a trusting relationship   - Encourage socialization   Outcome: Progressing  Goal: Refrain from self-neglect  Outcome: Progressing  Goal: Attend and participate in unit activities, including therapeutic, recreational, and educational groups  Description: Interventions:  - Provide therapeutic and educational activities daily, encourage attendance and participation, and document same in the medical record   Outcome: Progressing  Goal: Complete daily ADLs, including personal hygiene independently, as able  Description:  Interventions:  - Observe, teach, and assist patient with ADLS  -  Monitor and promote a balance of rest/activity, with adequate nutrition and elimination   Outcome: Progressing     Problem: Anxiety  Goal: Anxiety is at manageable level  Description: Interventions:  - Assess and monitor patient's anxiety level.   - Monitor for signs and symptoms (heart palpitations, chest pain, shortness of breath, headaches, nausea, feeling jumpy, restlessness, irritable, apprehensive).   - Collaborate with interdisciplinary team and initiate plan and interventions as ordered.  - [place] patient to unit/surroundings  - Explain treatment plan  - Encourage participation in care  - Encourage verbalization of concerns/fears  - Identify coping mechanisms  - Assist in developing anxiety-reducing skills  - Administer/offer alternative therapies  - Limit or eliminate stimulants  Outcome: Progressing     Problem: DISCHARGE PLANNING - CARE MANAGEMENT  Goal: Discharge to post-acute care or home with appropriate resources  Description: INTERVENTIONS:  - Conduct assessment to determine patient/family and health care team treatment goals, and need for post-acute services based on payer coverage, community resources, and patient preferences, and barriers to discharge  - Address psychosocial, clinical, and financial barriers to discharge as identified in assessment in conjunction with the patient/family and health care team  - Arrange appropriate level of post-acute services according to patient’s   needs and preference and payer coverage in collaboration with the physician and health care team  - Communicate with and update the patient/family, physician, and health care team regarding progress on the discharge plan  - Arrange appropriate transportation to post-acute venues  Outcome: Progressing     Problem: Knowledge Deficit  Goal: Patient/family/caregiver demonstrates understanding of disease process, treatment plan, medications, and discharge  instructions  Description: Complete learning assessment and assess knowledge base.  Interventions:  - Provide teaching at level of understanding  - Provide teaching via preferred learning methods  Outcome: Progressing

## 2024-06-11 NOTE — NURSING NOTE
Pt is calm and cooperative, visible at times on the unit. Psych medication and meal complaint. Denies SI, HI, AH,and VH. Denies any needs at this time.

## 2024-06-11 NOTE — NURSING NOTE
Patient has been quietly in the milieu all evening, mostly on the periphery. He is mostly keeping to himself with occasional short lived interaction with select others. He denies S.I.H.I.A/H V/H

## 2024-06-12 PROBLEM — F33.2 MDD (MAJOR DEPRESSIVE DISORDER), RECURRENT SEVERE, WITHOUT PSYCHOSIS (HCC): Status: ACTIVE | Noted: 2024-06-07

## 2024-06-12 PROCEDURE — 99232 SBSQ HOSP IP/OBS MODERATE 35: CPT | Performed by: STUDENT IN AN ORGANIZED HEALTH CARE EDUCATION/TRAINING PROGRAM

## 2024-06-12 RX ADMIN — LEVOTHYROXINE SODIUM 25 MCG: 25 TABLET ORAL at 06:39

## 2024-06-12 RX ADMIN — SERTRALINE HYDROCHLORIDE 125 MG: 100 TABLET ORAL at 08:17

## 2024-06-12 NOTE — NURSING NOTE
Patient is in the milieu, calm and cooperative. Patient expressed agitation r/t peer waking him up from his nap, but did not require any PRN medication. Patient denies all psych symptoms. Q7 observation maintained.

## 2024-06-12 NOTE — PROGRESS NOTES
06/12/24 0944   Team Meeting   Meeting Type Daily Rounds   Team Members Present   Team Members Present Physician;;Nurse   Physician Team Member Amarilys   Nursing Team Member [facility] Management Team Member Noa   Patient/Family Present   Patient Present No   Patient's Family Present No     Pt is calm and cooperative. Pt is medication and meal compliant. Pt is visible on the unit. Pt's discharge placement is pending.

## 2024-06-12 NOTE — PLAN OF CARE
Problem: Ineffective Coping  Goal: Identifies ineffective coping skills  Outcome: Progressing  Goal: Identifies healthy coping skills  Outcome: Progressing  Goal: Demonstrates healthy coping skills  Outcome: Progressing  Goal: Participates in unit activities  Description: Interventions:  - Provide therapeutic environment   - Provide required programming   - Redirect inappropriate behaviors   Outcome: Progressing     Problem: Risk for Self Injury/Neglect  Goal: Treatment Goal: Remain safe during length of stay, learn and adopt new coping skills, and be free of self-injurious ideation, impulses and acts at the time of discharge  Outcome: Progressing     Problem: Depression  Goal: Treatment Goal: Demonstrate behavioral control of depressive symptoms, verbalize feelings of improved mood/affect, and adopt new coping skills prior to discharge  Outcome: Progressing  Goal: Verbalize thoughts and feelings  Description: Interventions:  - Assess and re-assess patient's level of risk   - Engage patient in 1:1 interactions, daily, for a minimum of 15 minutes   - Encourage patient to express feelings, fears, frustrations, hopes   Outcome: Progressing  Goal: Refrain from harming self  Description: Interventions:  - Monitor patient closely, per order   - Supervise medication ingestion, monitor effects and side effects   Outcome: Progressing  Goal: Refrain from isolation  Description: Interventions:  - Develop a trusting relationship   - Encourage socialization   Outcome: Progressing  Goal: Refrain from self-neglect  Outcome: Progressing  Goal: Attend and participate in unit activities, including therapeutic, recreational, and educational groups  Description: Interventions:  - Provide therapeutic and educational activities daily, encourage attendance and participation, and document same in the medical record   Outcome: Progressing  Goal: Complete daily ADLs, including personal hygiene independently, as able  Description:  Interventions:  - Observe, teach, and assist patient with ADLS  -  Monitor and promote a balance of rest/activity, with adequate nutrition and elimination   Outcome: Progressing     Problem: Anxiety  Goal: Anxiety is at manageable level  Description: Interventions:  - Assess and monitor patient's anxiety level.   - Monitor for signs and symptoms (heart palpitations, chest pain, shortness of breath, headaches, nausea, feeling jumpy, restlessness, irritable, apprehensive).   - Collaborate with interdisciplinary team and initiate plan and interventions as ordered.  - [place] patient to unit/surroundings  - Explain treatment plan  - Encourage participation in care  - Encourage verbalization of concerns/fears  - Identify coping mechanisms  - Assist in developing anxiety-reducing skills  - Administer/offer alternative therapies  - Limit or eliminate stimulants  Outcome: Progressing     Problem: DISCHARGE PLANNING - CARE MANAGEMENT  Goal: Discharge to post-acute care or home with appropriate resources  Description: INTERVENTIONS:  - Conduct assessment to determine patient/family and health care team treatment goals, and need for post-acute services based on payer coverage, community resources, and patient preferences, and barriers to discharge  - Address psychosocial, clinical, and financial barriers to discharge as identified in assessment in conjunction with the patient/family and health care team  - Arrange appropriate level of post-acute services according to patient’s   needs and preference and payer coverage in collaboration with the physician and health care team  - Communicate with and update the patient/family, physician, and health care team regarding progress on the discharge plan  - Arrange appropriate transportation to post-acute venues  Outcome: Not Progressing

## 2024-06-12 NOTE — NURSING NOTE
Pt is calm and cooperative upon approach. Pt brightens upon approach, joking with staff. Pt is medication and meal compliant. Denies SI, HI, AH, and VH. Denies any needs at this time.

## 2024-06-12 NOTE — PLAN OF CARE
Pt has been attending groups regularly and has increased in participation. Pt reports participation is becoming easier as he is becoming more comfortable with the unit and peers.

## 2024-06-13 PROCEDURE — 99232 SBSQ HOSP IP/OBS MODERATE 35: CPT | Performed by: STUDENT IN AN ORGANIZED HEALTH CARE EDUCATION/TRAINING PROGRAM

## 2024-06-13 RX ADMIN — CYANOCOBALAMIN TAB 1000 MCG 1000 MCG: 1000 TAB at 08:02

## 2024-06-13 RX ADMIN — SERTRALINE HYDROCHLORIDE 125 MG: 100 TABLET ORAL at 08:02

## 2024-06-13 RX ADMIN — LEVOTHYROXINE SODIUM 25 MCG: 25 TABLET ORAL at 06:27

## 2024-06-13 NOTE — PROGRESS NOTES
06/13/24 0936   Team Meeting   Meeting Type Daily Rounds   Team Members Present   Team Members Present Physician;;Nurse   Physician Team Member Amarilys   Nursing Team Member [facility] Management Team Member Noa   Patient/Family Present   Patient Present No   Patient's Family Present No     Pt denies SI/HI/AVH. Pt is calm and cooperative. Pt is visible on the unit, pleasant. Pt's discharge is pending for CRR placement.

## 2024-06-13 NOTE — PLAN OF CARE
Problem: Ineffective Coping  Goal: Identifies ineffective coping skills  Outcome: Progressing  Goal: Identifies healthy coping skills  Outcome: Progressing  Goal: Demonstrates healthy coping skills  Outcome: Progressing  Goal: Participates in unit activities  Description: Interventions:  - Provide therapeutic environment   - Provide required programming   - Redirect inappropriate behaviors   Outcome: Progressing     Problem: Risk for Self Injury/Neglect  Goal: Treatment Goal: Remain safe during length of stay, learn and adopt new coping skills, and be free of self-injurious ideation, impulses and acts at the time of discharge  Outcome: Progressing     Problem: Depression  Goal: Treatment Goal: Demonstrate behavioral control of depressive symptoms, verbalize feelings of improved mood/affect, and adopt new coping skills prior to discharge  Outcome: Progressing  Goal: Verbalize thoughts and feelings  Description: Interventions:  - Assess and re-assess patient's level of risk   - Engage patient in 1:1 interactions, daily, for a minimum of 15 minutes   - Encourage patient to express feelings, fears, frustrations, hopes   Outcome: Progressing  Goal: Refrain from harming self  Description: Interventions:  - Monitor patient closely, per order   - Supervise medication ingestion, monitor effects and side effects   Outcome: Progressing  Goal: Refrain from isolation  Description: Interventions:  - Develop a trusting relationship   - Encourage socialization   Outcome: Progressing  Goal: Refrain from self-neglect  Outcome: Progressing  Goal: Attend and participate in unit activities, including therapeutic, recreational, and educational groups  Description: Interventions:  - Provide therapeutic and educational activities daily, encourage attendance and participation, and document same in the medical record   Outcome: Progressing  Goal: Complete daily ADLs, including personal hygiene independently, as able  Description:  Interventions:  - Observe, teach, and assist patient with ADLS  -  Monitor and promote a balance of rest/activity, with adequate nutrition and elimination   Outcome: Progressing     Problem: Anxiety  Goal: Anxiety is at manageable level  Description: Interventions:  - Assess and monitor patient's anxiety level.   - Monitor for signs and symptoms (heart palpitations, chest pain, shortness of breath, headaches, nausea, feeling jumpy, restlessness, irritable, apprehensive).   - Collaborate with interdisciplinary team and initiate plan and interventions as ordered.  - [place] patient to unit/surroundings  - Explain treatment plan  - Encourage participation in care  - Encourage verbalization of concerns/fears  - Identify coping mechanisms  - Assist in developing anxiety-reducing skills  - Administer/offer alternative therapies  - Limit or eliminate stimulants  Outcome: Progressing     Problem: DISCHARGE PLANNING - CARE MANAGEMENT  Goal: Discharge to post-acute care or home with appropriate resources  Description: INTERVENTIONS:  - Conduct assessment to determine patient/family and health care team treatment goals, and need for post-acute services based on payer coverage, community resources, and patient preferences, and barriers to discharge  - Address psychosocial, clinical, and financial barriers to discharge as identified in assessment in conjunction with the patient/family and health care team  - Arrange appropriate level of post-acute services according to patient’s   needs and preference and payer coverage in collaboration with the physician and health care team  - Communicate with and update the patient/family, physician, and health care team regarding progress on the discharge plan  - Arrange appropriate transportation to post-acute venues  Outcome: Progressing     Problem: Knowledge Deficit  Goal: Patient/family/caregiver demonstrates understanding of disease process, treatment plan, medications, and discharge  instructions  Description: Complete learning assessment and assess knowledge base.  Interventions:  - Provide teaching at level of understanding  - Provide teaching via preferred learning methods  Outcome: Progressing

## 2024-06-13 NOTE — PROGRESS NOTES
"Progress Note - Behavioral Health   Franco Roberson 39 y.o. male MRN: [id number]  Unit/Bed#: -02 Encounter: [id number]    Assessment & Plan   Principal Problem:    MDD (major depressive disorder), recurrent severe, without psychosis (HCC)  Active Problems:    Unspecified depressive disorder (HCC)    Medical clearance for psychiatric admission    Hyperlipidemia    Vitamin D deficiency    B12 deficiency    Hypothyroidism    Rule Out Autism spectrum disorder      Recommended Treatment:   No psychopharmacologic changes made at this time. Will continue to assess daily for further optimization.  Continue the following medications:  Zoloft 125 mg daily for mood  Continue to assess for adverse medication side effects.  Continue medication management per [facility] ([facility]) as indicated.  Continue to encourage group therapy, milieu therapy, individual therapy, and occupational therapy.  Continue to engage CM/SW to assist with collateral, disposition planning, and the implementation of an individualized, patient-centered plan of care.  Continue frequent safety checks and vitals per unit protocol.    Risks, benefits and possible side effects of Medications: Risks, benefits, and possible side effects of medications have previously been explained. No new medications at this time.    Legal status: 201 voluntary commitment  Disposition: TBD-awaiting placement    ------------------------------------------------------------    Subjective:     Patient's chart was reviewed, and patient's progress and plan was discussed with treatment team.    Per nursing: No behavioral issues. Med and meal compliant. PRNs in last 24 hours: None    Per CM/SW: No new updates     Franco was evaluated this morning in his room for continuity of care. Patient was cooperative and pleasant with interview. Patient reports mood today as \" reflected\".patient denies anxiety today.  Patient reports \"a little\" depression secondary to his peers " "being discharged and feeling isolated.  His goals for today are are to work on challenging intrusive \"what if\" statements.  Patient reports that he believes that his medications are effective and helping him.  Patient states that he is working on \"becoming a new me\".  At the time of today's evaluation, the patient denies AVH, active SI, passive SI, thoughts to self harm, and HI.     Progress Toward Goals: continued slow  improvement    Psychiatric Review of Systems:   Behavior over the last 24 hours:  unchanged  Sleep: Patient reports that for the last few nights his sleep has been improved.  Patient slept through the entire night.    Appetite: Denies any changes or issues with appetite.  Medication side effects:No   ROS:no complaints, all other systems are negative.   Last bowel movement: Per record review, last bowel movement was yesterday    Vital Signs in last 24 hours:  Reviewed, within normal limits  Temp:  [97.2 °F (36.2 °C)-97.3 °F (36.3 °C)] 97.2 °F (36.2 °C)  HR:  [57-97] 57  Resp:  [16] 16  BP: (116-143)/(62) 116/62    Laboratory Results:    I have personally reviewed all pertinent laboratory/tests results.  Most Recent Labs:   Lab Results   Component Value Date    WBC 5.42 05/15/2024    RBC 5.05 05/15/2024    HGB 16.1 05/15/2024    HCT 49.4 (H) 05/15/2024     05/15/2024    RDW 12.6 05/15/2024    NEUTROABS 2.83 05/15/2024    SODIUM 142 05/15/2024    K 4.1 05/15/2024     05/15/2024    CO2 28 05/15/2024    BUN 13 05/15/2024    CREATININE 0.97 05/15/2024    GLUC 89 05/15/2024    GLUF 89 05/15/2024    CALCIUM 9.3 05/15/2024    AST 21 05/15/2024    ALT 19 05/15/2024    ALKPHOS 66 05/15/2024    TP 7.6 05/15/2024    ALB 4.3 05/15/2024    TBILI 0.69 05/15/2024    CHOLESTEROL 191 05/15/2024    HDL 44 05/15/2024    TRIG 116 05/15/2024    LDLCALC 124 (H) 05/15/2024    NONHDLC 147 05/15/2024    SRT3XCWJPOFD 5.162 (H) 05/15/2024    FREET4 0.60 (L) 05/15/2024       Mental Status Evaluation:    Appearance:  " "Male, alert, dressed casually, slightly oily hair, marginal hygiene/grooming   Behavior:  Cooperative, pleasant, sitting in bed   Speech:  coherent, soft with occasional stuttering    Mood:  \"Reflective\"   Affect:  constricted   Thought Process:  goal directed, linear   Associations: concrete associations   Thought Content:  no overt delusions, intrusive thoughts    Perceptual Disturbances: Denies auditory or visual hallucinations and Does not appear to be responding to internal stimuli   Risk Potential: Suicidal ideation - None  Homicidal ideation - None  Potential for aggression - No   Sensorium:  oriented to person and situation   Consciousness:  alert and awake   Attention/Concentration: attention span and concentration are age appropriate   Insight:  limited   Judgment: limited   Gait/Station: in bed   Motor Activity: no abnormal movements       Current Medications:  Current Facility-Administered Medications   Medication Dose Route Frequency Provider Last Rate    acetaminophen  650 mg Oral Q6H PRN Basilio Brown MD      acetaminophen  650 mg Oral Q4H PRN Basilio Brown MD      acetaminophen  975 mg Oral Q6H PRN Basilio Brown MD      aluminum-magnesium hydroxide-simethicone  30 mL Oral Q4H PRN Basilio Brown MD      Artificial Tears  1 drop Both Eyes Q3H PRN Basilio Brown MD      atorvastatin  10 mg Oral Daily With Dinner WALLY Baptiste      benztropine  1 mg Intramuscular Q4H PRN Max 6/day Basilio Brown MD      benztropine  1 mg Oral Q4H PRN Max 6/day Basilio Brown MD      cyanocobalamin  1,000 mcg Oral Daily WALLY Baptiste      cyanocobalamin  1,000 mcg Intramuscular Once WALLY Baptiste      hydrOXYzine HCL  50 mg Oral Q6H PRN Max 4/day Basilio Brown MD      Or    diphenhydrAMINE  50 mg Intramuscular Q6H PRN Basilio Brown MD      ergocalciferol  50,000 Units Oral Weekly WALLY Baptiste      hydrOXYzine HCL "  100 mg Oral Q6H PRN Max 4/day Basilio Brown MD      Or    LORazepam  2 mg Intramuscular Q6H PRN Basilio Brown MD      hydrOXYzine HCL  25 mg Oral Q6H PRN Max 4/day Basilio Brown MD      levothyroxine  25 mcg Oral Early Morning WALLY Baptiste      OLANZapine  5 mg Oral Q4H PRN Max 3/day Basilio Brown MD      Or    OLANZapine  2.5 mg Intramuscular Q4H PRN Max 3/day Basilio Brown MD      OLANZapine  5 mg Oral Q3H PRN Max 3/day Basilio Brown MD      Or    OLANZapine  5 mg Intramuscular Q3H PRN Max 3/day Basilio Brown MD      OLANZapine  2.5 mg Oral Q4H PRN Max 6/day Basilio Brown MD      polyethylene glycol  17 g Oral Daily PRN Basilio Brown MD      propranolol  10 mg Oral Q8H PRN Basilio Brown MD      senna-docusate sodium  1 tablet Oral Daily PRN Basilio Brown MD      sertraline  125 mg Oral Daily WALLY Gomez      traZODone  50 mg Oral HS PRN Basilio Brown MD         Behavioral Health Medications: All current active meds have been reviewed.  No changes made today      Litzy James DO 06/13/24  Psychiatry Residency, PGY- I       This note was not shared with the patient due to reasonable likelihood of causing patient harm.    This note has been constructed using a voice recognition system (Dragon). As, a result, there may be translation, syntax, or grammatical errors.  Grammatical, translation, syntax errors, random word insertions, spelling mistakes, and incomplete sentences may be an occasional consequence of this system secondary to software limitations with voice recognition, ambient noise, and hardware issues. If you have any questions or concerns about the content, text, or information contained within the body of this dictation, please contact the provider for clarification.

## 2024-06-13 NOTE — NURSING NOTE
Pt denies SI/HI/AH/VH. Present in dayroom and milieu. Medication and meal compliant. Social with select peers. Pt is calm and pleasant. Scant with communication. Compliant with Lunch. No further concerns as of present. Plan of care ongoing.

## 2024-06-13 NOTE — NURSING NOTE
Pt visible and social on unit with select peers. Denies symptoms, needs and concerns on assessment. Brighter and less constricted. Calm and cooperative with unit routines and care. Safety checks ongoing.

## 2024-06-14 PROCEDURE — 99232 SBSQ HOSP IP/OBS MODERATE 35: CPT | Performed by: STUDENT IN AN ORGANIZED HEALTH CARE EDUCATION/TRAINING PROGRAM

## 2024-06-14 RX ADMIN — LEVOTHYROXINE SODIUM 25 MCG: 25 TABLET ORAL at 06:36

## 2024-06-14 RX ADMIN — TRAZODONE HYDROCHLORIDE 50 MG: 50 TABLET ORAL at 00:33

## 2024-06-14 RX ADMIN — SERTRALINE HYDROCHLORIDE 125 MG: 100 TABLET ORAL at 09:34

## 2024-06-14 NOTE — PROGRESS NOTES
06/14/24 1527   Team Meeting   Meeting Type Tx Team Meeting   Initial Conference Date 06/14/24   Team Members Present   Team Members Present Physician;Nurse;   Physician Team Member Amarilys   Nursing Team Member Erick   Care Management Team Member Suhas   Patient/Family Present   Patient Present Yes   Patient's Family Present No     Tx plan was reviewed and discussed with Pt. Pt was encouraged to attend groups. Medication was discussed with Pt. Pt signed tx plan.

## 2024-06-14 NOTE — NURSING NOTE
Pt pleasant and social on unit with peers and staff. Brightens on approach and with conversation. Jokes with staff, appropriate. Denies SI, HI and hallucinations. Compliant with unit routines and care. Safety checks ongoing.

## 2024-06-14 NOTE — NURSING NOTE
Patient is periodically visible on the unit.  Blunted affact, scant and gaurded in conversation.  Denies SI HI and A/V hallucinations.

## 2024-06-14 NOTE — NURSING NOTE
Pt became anxious after retiring to bed and came to staff. States he was having racing thoughts about not knowing who he is now that his mother is gone and he is starting to feel less depressed. Pt declined PRN medication stating he felt calmer after speaking with this RN. Encouraged to come for sleep aid as needed.

## 2024-06-14 NOTE — PROGRESS NOTES
"Progress Note - Behavioral Health     Franco Roberson 39 y.o. male MRN: [id number]   Unit/Bed#: -02 Encounter: [id number]    Behavior over the last 24 hours: unchanged.     Subjective: I saw Franco for follow up and continuation of care. I have reviewed the chart and discussed progress with the treatment team. Patient is pleasant, calm, cooperative, visible and social.  He is medication and meal compliant.  He is attending groups. He remains in good behavorial control. PRNs in the last 24 hours include: Trazodone 50 mg (6/13 0033) for insomnia and racing anxious thoughts which was effective.    On assessment, Franco is seen in bed but sits up appropriate to converse. Constricted affect and limited eye contact noted consistent with abnormal social-emotional reciprocity. He feels \"drained\" after high anxiety and difficulty initiating sleep last night. He endorses mild depression and anxious with ruminating thoughts about his future and life purpose. He ruminated on his mother's death. He struggles to identify future goals but motivational interviewing performed to encourage planning of this during admission while awaiting placement. He has good insight that he has \"learned and re-learned\" coping strategies but now has to apply it. He appears disheveled, malodorous with unkept hair but endorses showering daily. He denies suicidal/ homicidal ideations, plan, intent, self-injurious behaviors or urges and contracts for safety on the unit. Franco does not voice any paranoia or delusions, denies auditory/ visual hallucinations and does not appear to be responding to internal stimuli.     Sleep: difficulty falling asleep  Appetite: normal  Medication side effects: No   ROS: no complaints, all other systems are negative    Mental Status Evaluation:    Appearance:  disheveled, marginal hygiene, overweight, unkept hair, malodorous , no distress   Behavior:  pleasant, cooperative, calm   Speech:  normal rate, monotone   Mood:  " "slightly depressed, anxious, \"drained\"   Affect:  constricted   Thought Process:  logical, coherent   Associations: intact associations   Thought Content:  no overt delusions, ruminating thoughts   Perceptual Disturbances: no auditory hallucinations, no visual hallucinations   Risk Potential: Suicidal ideation - None at present, contracts for safety on the unit  Homicidal ideation - None  Potential for aggression - No   Sensorium:  oriented to person, place, time/date, and situation   Memory:  recent and remote memory grossly intact   Consciousness:  alert and awake   Attention/Concentration: attention span and concentration are age appropriate   Insight:  good   Judgment: fair   Gait/ Station: Normal gait/ station   Motor movements: No abnormal movements     Suicide/Homicide Risk Assessment:  Risk of Harm to Self:   Nursing Suicide Risk Assessment Last 24 hours: C-SSRS Risk (Since Last Contact)  Calculated C-SSRS Risk Score (Since Last Contact): No Risk Indicated  Based on today's assessment, Franco presents the following risk of harm to self: none    Risk of Harm to Others:  Nursing Homicide Risk Assessment: Violence Risk to Others: Denies within past 6 months  Based on today's assessment, Franco presents the following risk of harm to others: none    Vital signs in last 24 hours:    Temp:  [97.2 °F (36.2 °C)-97.3 °F (36.3 °C)] 97.3 °F (36.3 °C)  HR:  [65-68] 65  Resp:  [16] 16  BP: (122-140)/(57-82) 122/57    Laboratory results: I have personally reviewed all pertinent laboratory/tests results    [facility] Progress Note Labs Revised: Labs in last 72 hours: No results for input(s): \"WBC\", \"RBC\", \"HGB\", \"HCT\", \"PLT\", \"RDW\", \"TOTANEUTABS\", \"NEUTROABS\", \"SODIUM\", \"K\", \"CL\", \"CO2\", \"BUN\", \"CREATININE\", \"GLUC\", \"CALCIUM\", \"AST\", \"ALT\", \"ALKPHOS\", \"TP\", \"ALB\", \"TBILI\", \"CHOLESTEROL\", \"HDL\", \"TRIG\", \"LDLCALC\", \"VALPROICTOT\", \"CARBAMAZEPIN\", \"LITHIUM\", \"AMMONIA\", \"EYP2ENBKIEEW\", \"FREET4\", \"T3FREE\", \"PREGTESTUR\", \"PREGSERUM\", " "\"HCG\", \"HCGQUANT\", \"SYPHILISAB\" in the last 72 hours.      Progress Toward Goals: progressing slowly, attends groups, participates in milieu therapy, mood is stabilizing, placement pending    Assessment & Plan   Principal Problem:    MDD (major depressive disorder), recurrent severe, without psychosis (HCC)  Active Problems:    Unspecified depressive disorder (HCC)    Medical clearance for psychiatric admission    Hyperlipidemia    Vitamin D deficiency    B12 deficiency    Hypothyroidism    Rule Out Autism spectrum disorder      Treatment Plan:   Continue with group therapy, milieu therapy and individual therapy  Behavioral Health checks per unit protocol for safety  Monitor progress over weekend, no anticipated changes  Discharge planning ongoing to group home placement  No changes, continue current medications:      Current Facility-Administered Medications   Medication Dose Route Frequency Provider Last Rate    acetaminophen  650 mg Oral Q6H PRN Basilio Brown MD      acetaminophen  650 mg Oral Q4H PRN Basilio Brown MD      acetaminophen  975 mg Oral Q6H PRN Basilio Brown MD      aluminum-magnesium hydroxide-simethicone  30 mL Oral Q4H PRN Basilio Brown MD      Artificial Tears  1 drop Both Eyes Q3H PRN Basilio Brown MD      atorvastatin  10 mg Oral Daily With Dinner WALLY Baptiste      benztropine  1 mg Intramuscular Q4H PRN Max 6/day Basilio Brown MD      benztropine  1 mg Oral Q4H PRN Max 6/day Basilio Brown MD      cyanocobalamin  1,000 mcg Oral Daily WALLY Baptiste      cyanocobalamin  1,000 mcg Intramuscular Once WALLY Baptiste      hydrOXYzine HCL  50 mg Oral Q6H PRN Max 4/day Basilio Brown MD      Or    diphenhydrAMINE  50 mg Intramuscular Q6H PRN Basilio Brown MD      ergocalciferol  50,000 Units Oral Weekly WALLY Baptiste      hydrOXYzine HCL  100 mg Oral Q6H PRN Max 4/day Basilio Brown MD  "     Or    LORazepam  2 mg Intramuscular Q6H PRN Basilio Brwon MD      hydrOXYzine HCL  25 mg Oral Q6H PRN Max 4/day Basilio Brown MD      levothyroxine  25 mcg Oral Early Morning WALLY Baptiste      OLANZapine  5 mg Oral Q4H PRN Max 3/day Basilio Brown MD      Or    OLANZapine  2.5 mg Intramuscular Q4H PRN Max 3/day Basilio Brown MD      OLANZapine  5 mg Oral Q3H PRN Max 3/day Basilio Brown MD      Or    OLANZapine  5 mg Intramuscular Q3H PRN Max 3/day Basilio Brown MD      OLANZapine  2.5 mg Oral Q4H PRN Max 6/day Basilio Brown MD      polyethylene glycol  17 g Oral Daily PRN Basilio Brown MD      propranolol  10 mg Oral Q8H PRN Basilio Brown MD      senna-docusate sodium  1 tablet Oral Daily PRN Basilio Brown MD      sertraline  125 mg Oral Daily WALLY Gomez      traZODone  50 mg Oral HS PRN Basilio Brown MD           Risks / Benefits of Treatment:  Risks, benefits, and possible side effects of medications explained to patient. Patient has limited understanding of risks and benefits of treatment at this time, but agrees to take medications as prescribed.    Counseling / Coordination of Care:    Total floor / unit time spent today 25 minutes. Greater than 50% of total time was spent with the patient and / or family counseling and / or coordination of care. A description of counseling / coordination of care:  Patient's progress discussed nursing  Medications, treatment progress and treatment plan reviewed with patient.  Importance of medication and treatment compliance reviewed with patient.  Cognitive techniques utilized during the session.  Reassurance and supportive therapy provided.  Encouraged participation in milieu and group therapy on the unit.    This note has been constructed using a voice recognition system. There may be translation, syntax, or grammatical errors. If you have any questions, please contact the  dictating author.    WALLY Becerra 06/14/24

## 2024-06-14 NOTE — PLAN OF CARE
Problem: Ineffective Coping  Goal: Identifies ineffective coping skills  Outcome: Progressing  Goal: Identifies healthy coping skills  Outcome: Progressing  Goal: Demonstrates healthy coping skills  Outcome: Progressing     Problem: Risk for Self Injury/Neglect  Goal: Treatment Goal: Remain safe during length of stay, learn and adopt new coping skills, and be free of self-injurious ideation, impulses and acts at the time of discharge  Outcome: Progressing     Problem: Depression  Goal: Treatment Goal: Demonstrate behavioral control of depressive symptoms, verbalize feelings of improved mood/affect, and adopt new coping skills prior to discharge  Outcome: Progressing  Goal: Verbalize thoughts and feelings  Description: Interventions:  - Assess and re-assess patient's level of risk   - Engage patient in 1:1 interactions, daily, for a minimum of 15 minutes   - Encourage patient to express feelings, fears, frustrations, hopes   Outcome: Progressing  Goal: Refrain from harming self  Description: Interventions:  - Monitor patient closely, per order   - Supervise medication ingestion, monitor effects and side effects   Outcome: Progressing  Goal: Refrain from isolation  Description: Interventions:  - Develop a trusting relationship   - Encourage socialization   Outcome: Progressing  Goal: Refrain from self-neglect  Outcome: Progressing  Goal: Attend and participate in unit activities, including therapeutic, recreational, and educational groups  Description: Interventions:  - Provide therapeutic and educational activities daily, encourage attendance and participation, and document same in the medical record   Outcome: Progressing  Goal: Complete daily ADLs, including personal hygiene independently, as able  Description: Interventions:  - Observe, teach, and assist patient with ADLS  -  Monitor and promote a balance of rest/activity, with adequate nutrition and elimination   Outcome: Progressing     Problem: Anxiety  Goal:  Anxiety is at manageable level  Description: Interventions:  - Assess and monitor patient's anxiety level.   - Monitor for signs and symptoms (heart palpitations, chest pain, shortness of breath, headaches, nausea, feeling jumpy, restlessness, irritable, apprehensive).   - Collaborate with interdisciplinary team and initiate plan and interventions as ordered.  - [place] patient to unit/surroundings  - Explain treatment plan  - Encourage participation in care  - Encourage verbalization of concerns/fears  - Identify coping mechanisms  - Assist in developing anxiety-reducing skills  - Administer/offer alternative therapies  - Limit or eliminate stimulants  Outcome: Progressing     Problem: Knowledge Deficit  Goal: Patient/family/caregiver demonstrates understanding of disease process, treatment plan, medications, and discharge instructions  Description: Complete learning assessment and assess knowledge base.  Interventions:  - Provide teaching at level of understanding  - Provide teaching via preferred learning methods  Outcome: Not Progressing

## 2024-06-15 PROCEDURE — 99232 SBSQ HOSP IP/OBS MODERATE 35: CPT | Performed by: PSYCHIATRY & NEUROLOGY

## 2024-06-15 RX ADMIN — LEVOTHYROXINE SODIUM 25 MCG: 25 TABLET ORAL at 05:41

## 2024-06-15 RX ADMIN — SERTRALINE HYDROCHLORIDE 125 MG: 100 TABLET ORAL at 20:18

## 2024-06-15 NOTE — PROGRESS NOTES
"Progress Note - Behavioral Health   Franco Roberson 39 y.o. male MRN: [id number]  Unit/Bed#: [facility] 352-02 Encounter: [id number]    Assessment & Plan   Principal Problem:    MDD (major depressive disorder), recurrent severe, without psychosis (HCC)  Active Problems:    Unspecified depressive disorder (HCC)    Medical clearance for psychiatric admission    Hyperlipidemia    Vitamin D deficiency    B12 deficiency    Hypothyroidism    Rule Out Autism spectrum disorder      Recommended Treatment:   No psychopharmacologic changes at this time. Continue psychiatric medications as prescribed.    Continue with group therapy, milieu therapy and occupational therapy.    Continue frequent safety checks and vitals per unit protocol.  Case discussed with treatment team.  Risks, benefits and possible side effects of Medications: Risks, benefits, and possible side effects of medications have previously been explained. No new medications at this time.    Current Legal Status: 201  Disposition: pending placement  ------------------------------------------------------------    Subjective: Per nursing report, Franco has been visible, scant and blunted in affect, guarded, had episode of anxiety and required trazodone to sleep. Less talkative yesterday. He is compliant with scheduled psychiatric medications other than Vit B12 and atorvastatin.  PRNs: trazodone for sleep     Today, Franco was seen and evaluated for continuity of care. On evaluation, patient is concrete, poor eye contact,constricted affect.  He reports feeling \"okay\" this morning. Patient does report \"little low\" energy levels. He was educated on importance of Vit B 12 supplementation and relation to depressive symptoms. Patient states he is still \"recovering from 2 days ago\" where he reports he was experiencing anxiety, ruminating over past regrets.    Patient denies SI/HI/AVH at this time. Patient reports tolerating current medication regimen without any noted side effects. " "    Progress Toward Goals: slow improvement    Psychiatric Review of Systems:  Behavior over the last 24 hours:  improving slowly  Sleep: normalrequired prn   Appetite: adequate  Medication side effects: none verbalized  ROS: Complete review of systems is negative except as noted above.    Vital signs in last 24 hours:   Temp:  [97.3 °F (36.3 °C)-97.6 °F (36.4 °C)] 97.3 °F (36.3 °C)  HR:  [62-72] 62  Resp:  [16] 16  BP: (115-119)/(68-70) 115/68    Mental Status Exam:  Appearance:  alert, poor eye contact, appears stated age, and marginal grooming/hygiene, unkempt and greasy hair, NAD   Behavior:  calm, cooperative, and sitting comfortably   Motor: no abnormal movements and Unable to assess   Speech:  delayed initiation, clear, slow, normal volume, coherent, and monotone   Mood:  \"okay\"   Affect:  constricted   Thought Process:  Linear, concrete   Thought Content: no verbalized delusions or overt paranoia   Perceptual disturbances: no reported hallucinations and does not appear to be responding to internal stimuli at this time   Risk Potential: No active or passive suicidal or homicidal ideation was verbalized during interview   Cognition: oriented to self and situation, appears to be of average intelligence, and cognition not formally tested   Insight:  Limited   Judgment: Limited     Current Medications:  Current Facility-Administered Medications   Medication Dose Route Frequency Provider Last Rate    acetaminophen  650 mg Oral Q6H PRN Basilio Brown MD      acetaminophen  650 mg Oral Q4H PRN Basilio Brown MD      acetaminophen  975 mg Oral Q6H PRN Basilio Brown MD      aluminum-magnesium hydroxide-simethicone  30 mL Oral Q4H PRN Basilio Brown MD      Artificial Tears  1 drop Both Eyes Q3H PRN Basilio Brown MD      atorvastatin  10 mg Oral Daily With Dinner WALLY Baptiste      benztropine  1 mg Intramuscular Q4H PRN Max 6/day Basilio Brown MD      benztropine  1 mg " Oral Q4H PRN Max 6/day Basilio Brown MD      cyanocobalamin  1,000 mcg Oral Daily Laura Ashford, CRDARIEN      cyanocobalamin  1,000 mcg Intramuscular Once Laura Ashford, WALLY      hydrOXYzine HCL  50 mg Oral Q6H PRN Max 4/day Basilio Brown MD      Or    diphenhydrAMINE  50 mg Intramuscular Q6H PRN Basilio Brown MD      ergocalciferol  50,000 Units Oral Weekly Laura Ashford, WALLY      hydrOXYzine HCL  100 mg Oral Q6H PRN Max 4/day Basilio Brown MD      Or    LORazepam  2 mg Intramuscular Q6H PRN Basilio Brown MD      hydrOXYzine HCL  25 mg Oral Q6H PRN Max 4/day Basilio Brown MD      levothyroxine  25 mcg Oral Early Morning Laura Diasomid, WALLY      OLANZapine  5 mg Oral Q4H PRN Max 3/day Basilio Brown MD      Or    OLANZapine  2.5 mg Intramuscular Q4H PRN Max 3/day Basilio Brown MD      OLANZapine  5 mg Oral Q3H PRN Max 3/day Basilio Brown MD      Or    OLANZapine  5 mg Intramuscular Q3H PRN Max 3/day Basilio Brown MD      OLANZapine  2.5 mg Oral Q4H PRN Max 6/day Basilio Brown MD      polyethylene glycol  17 g Oral Daily PRN Basilio Brown MD      propranolol  10 mg Oral Q8H PRN Basilio Brown MD      senna-docusate sodium  1 tablet Oral Daily PRN Basilio Brown MD      sertraline  125 mg Oral Daily WALLY Gomez      traZODone  50 mg Oral HS PRN Basilio Brown MD         Behavioral Health Medications: all current active meds have been reviewed. Changes as in plan section above.    Laboratory results:  I have personally reviewed all pertinent laboratory/tests results.  No results found for this or any previous visit (from the past 48 hour(s)).     Sherrill Davey,

## 2024-06-15 NOTE — NURSING NOTE
Patient denies SI/HI and A/V hallucinations. Patient has been med/meal compliant, calm, cooperative, and pleasant.

## 2024-06-15 NOTE — PLAN OF CARE
Problem: Ineffective Coping  Goal: Identifies ineffective coping skills  Outcome: Progressing     Problem: Depression  Goal: Verbalize thoughts and feelings  Description: Interventions:  - Assess and re-assess patient's level of risk   - Engage patient in 1:1 interactions, daily, for a minimum of 15 minutes   - Encourage patient to express feelings, fears, frustrations, hopes   Outcome: Progressing  Goal: Refrain from harming self  Description: Interventions:  - Monitor patient closely, per order   - Supervise medication ingestion, monitor effects and side effects   Outcome: Progressing  Goal: Refrain from isolation  Description: Interventions:  - Develop a trusting relationship   - Encourage socialization   Outcome: Progressing

## 2024-06-15 NOTE — NURSING NOTE
"Pt visible on unit and selectively social with peers. States he feels \"empty\" today but that \"this is normal when I'm recovering from something like last night\" referring to episode of anxiety and taking trazodone to assist him sleep. Pt declines need for sleep aid this evening. Denies SI, HI and hallucinations. Less talkative and appears more depressed today. Compliant with unit routines and care. Safety checks continue.  "

## 2024-06-16 PROCEDURE — 99232 SBSQ HOSP IP/OBS MODERATE 35: CPT | Performed by: PSYCHIATRY & NEUROLOGY

## 2024-06-16 RX ORDER — ARIPIPRAZOLE 2 MG/1
2 TABLET ORAL DAILY
Status: DISCONTINUED | OUTPATIENT
Start: 2024-06-16 | End: 2024-06-18

## 2024-06-16 RX ADMIN — ACETAMINOPHEN 975 MG: 325 TABLET, FILM COATED ORAL at 17:16

## 2024-06-16 RX ADMIN — ARIPIPRAZOLE 2 MG: 2 TABLET ORAL at 17:12

## 2024-06-16 RX ADMIN — CYANOCOBALAMIN TAB 1000 MCG 1000 MCG: 1000 TAB at 10:07

## 2024-06-16 RX ADMIN — SERTRALINE HYDROCHLORIDE 125 MG: 100 TABLET ORAL at 09:24

## 2024-06-16 RX ADMIN — ATORVASTATIN CALCIUM 10 MG: 10 TABLET, FILM COATED ORAL at 17:12

## 2024-06-16 RX ADMIN — LEVOTHYROXINE SODIUM 25 MCG: 25 TABLET ORAL at 06:04

## 2024-06-16 NOTE — NURSING NOTE
"Patient awake in [facility] area. He requested to speak to this writer. Upon approach, patient reported he has not been feeling well. He stated his mood has been very depressed to the point he has suicidal thoughts \"If they were to discharge soon, I will definitely find a way to carry it out.\" Patient stated he has no plan or intent while on the inpatient unit.     Patient also reported that he has feelings of anger and rage. He stated it was a challenge today to not act upon his feelings. He stated he needs to be alone often because he feels overstimulated. He reported that having a roommate is triggering him.    Patient discussed his past trauma of losing his father at the age of 2 and watching his mother recently die from an illness. Patient further stated that once he received the diagnosis of ASD he realized \"I will probably never do the things I wanted to do. I will never get . I will never have children. I can't even make friends.\"     Patient is interested in discussing his medication options with his provider. He is also hopeful for a room change, so he can have a place to go when he is overstimulated. Patient stated he will speak to the team tomorrow and Monday regarding his current feelings, but hopes by speaking now, staff will understand why he is more quiet during the day.     Support given.  "

## 2024-06-16 NOTE — NURSING NOTE
Patient requested sertraline 125 mg QD stating he was asleep earlier and did not receive it. Provider contacted and approved medication to be administered at 2000. Given.     Patient remained visible on the unit throughout the evening. Cooperative with routine. Denied any unmet needs.
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
## 2024-06-16 NOTE — NURSING NOTE
"Patient was visible for a short period of time, pacing.  He reported feeling agitated, irritable, depressed this morning.  Patient was offered medications and he refused them but stated \"Im open now to taking meds.\"  Patient appears flat and sad. Patient reports SI without a plan and states he will come to staff if he needs to.   "

## 2024-06-17 PROCEDURE — 99232 SBSQ HOSP IP/OBS MODERATE 35: CPT | Performed by: STUDENT IN AN ORGANIZED HEALTH CARE EDUCATION/TRAINING PROGRAM

## 2024-06-17 RX ADMIN — CYANOCOBALAMIN TAB 1000 MCG 1000 MCG: 1000 TAB at 08:12

## 2024-06-17 RX ADMIN — ARIPIPRAZOLE 2 MG: 2 TABLET ORAL at 08:12

## 2024-06-17 RX ADMIN — ATORVASTATIN CALCIUM 10 MG: 10 TABLET, FILM COATED ORAL at 18:21

## 2024-06-17 RX ADMIN — SERTRALINE HYDROCHLORIDE 125 MG: 100 TABLET ORAL at 08:12

## 2024-06-17 RX ADMIN — LEVOTHYROXINE SODIUM 25 MCG: 25 TABLET ORAL at 06:33

## 2024-06-17 NOTE — PROGRESS NOTES
06/17/24 0939   Team Meeting   Meeting Type Daily Rounds   Team Members Present   Team Members Present Physician;Nurse;   Physician Team Member Amarilys   [facility]   Patient/Family Present   Patient Present No   Patient's Family Present No     Pt med/meal compliant. Visible on the unit, social with select peers. Pt awake overnight, requesting to speak with RN. Reporting passive SI, reporting anger and agitation throughout the day. Requesting room change. Discharge pending placement.

## 2024-06-17 NOTE — NURSING NOTE
Pt is withdrawn to self, but able to make needs known. Reports he slept well the previous night. Scant in conversation but denies SI/HI/AH/VH at this time. Ate breakfast but declined to eat lunch. Medication compliant.

## 2024-06-17 NOTE — NURSING NOTE
"Patient remained visible on the unit throughout the evening. Cooperative with routine. Denied any unmet needs. Upon approach, patient reported he felt relief after letting staff know the way he has been feeling. Support given. Patient reported \"I am doing okay right now. I will definitely get you if I need you.\" Patient was able to make eye contact and smiled briefly.   "

## 2024-06-17 NOTE — PROGRESS NOTES
"Progress Note - Behavioral Health   Franco Roberson 39 y.o. male MRN: [id number]  Unit/Bed#: [facility] 352-02 Encounter: [id number]    Subjective:   Per nursing report, patient is doing somewhat better today.  He did have a difficult weekend with regard to feelings of depression and anger.  He was started on Abilify over the weekend and has been compliant with medications.  No acute behaviors overnight. Patient is pleasant and cooperative with interview, though remains somewhat depressed and frustrated.  He reports tolerating initiation of Abilify fairly well.  He notes frustration with regards to feeling different from others and feeling disconnected.  Spent time discussing working on developing his skills for independence with support once he is discharged and in a structured living environment.  He expressed understanding of this.  He denies any current SI, HI, or AVH.    Behavior over the last 24 hours:  unchanged  Sleep: normal  Appetite: normal  Medication side effects: No  ROS: no complaints and all other systems are negative    Mental Status Evaluation:  Appearance:  dressed appropriately and adequately groomed   Behavior:  calm, pleasant, cooperative, and guarded   Speech:  soft and scant   Mood:  \"okay\"   Affect:  constricted   Thought Process:  linear and goal directed   Associations: concrete associations   Thought Content:  no overt delusions   Perceptual Disturbances: denies auditory or visual hallucinations when asked, does not appear responding to internal stimuli   Risk Potential: Suicidal ideation - None  Homicidal ideation - None  Potential for aggression - Not at present   Sensorium:  oriented to person, place, and time/date   Memory:  recent and remote memory grossly intact   Consciousness:  alert and awake   Attention/Concentration: attention span and concentration are age appropriate   Insight:  limited   Judgment: limited   Gait/Station: normal gait/station, normal balance   Motor Activity: no abnormal " movements     Medications: all current active meds have been reviewed.  Current Facility-Administered Medications   Medication Dose Route Frequency Provider Last Rate    acetaminophen  650 mg Oral Q6H PRN Basilio Brown MD      acetaminophen  650 mg Oral Q4H PRN Basilio Brown MD      acetaminophen  975 mg Oral Q6H PRN Basilio Bronw MD      aluminum-magnesium hydroxide-simethicone  30 mL Oral Q4H PRN Basilio Brown MD      ARIPiprazole  2 mg Oral Daily Sherrill Davey DO      Artificial Tears  1 drop Both Eyes Q3H PRN Basilio Brown MD      atorvastatin  10 mg Oral Daily With Dinner WALLY Baptiste      benztropine  1 mg Intramuscular Q4H PRN Max 6/day Basilio Brown MD      benztropine  1 mg Oral Q4H PRN Max 6/day Basilio Brown MD      cyanocobalamin  1,000 mcg Oral Daily WALLY Baptiste      cyanocobalamin  1,000 mcg Intramuscular Once WALLY Baptiste      hydrOXYzine HCL  50 mg Oral Q6H PRN Max 4/day Basilio Brown MD      Or    diphenhydrAMINE  50 mg Intramuscular Q6H PRN Basilio Brown MD      ergocalciferol  50,000 Units Oral Weekly WALLY Baptiste      hydrOXYzine HCL  100 mg Oral Q6H PRN Max 4/day Basilio Brown MD      Or    LORazepam  2 mg Intramuscular Q6H PRN Basilio Brown MD      hydrOXYzine HCL  25 mg Oral Q6H PRN Max 4/day Basilio Brown MD      levothyroxine  25 mcg Oral Early Morning WALLY Baptiste      OLANZapine  5 mg Oral Q4H PRN Max 3/day Basilio Brown MD      Or    OLANZapine  2.5 mg Intramuscular Q4H PRN Max 3/day Basilio Brown MD      OLANZapine  5 mg Oral Q3H PRN Max 3/day Basilio Brown MD      Or    OLANZapine  5 mg Intramuscular Q3H PRN Max 3/day Basilio Brown MD      OLANZapine  2.5 mg Oral Q4H PRN Max 6/day Basilio Brown MD      polyethylene glycol  17 g Oral Daily PRN Basilio Brown MD      propranolol  10 mg Oral Q8H PRN  Basilio Brown MD      senna-docusate sodium  1 tablet Oral Daily PRN Basilio Brown MD      sertraline  125 mg Oral Daily WALLY Gomez      traZODone  50 mg Oral HS PRN Basilio Brown MD         Labs: I have personally reviewed all pertinent laboratory/tests results  Most Recent Labs:   Lab Results   Component Value Date    WBC 5.42 05/15/2024    RBC 5.05 05/15/2024    HGB 16.1 05/15/2024    HCT 49.4 (H) 05/15/2024     05/15/2024    RDW 12.6 05/15/2024    NEUTROABS 2.83 05/15/2024    SODIUM 142 05/15/2024    K 4.1 05/15/2024     05/15/2024    CO2 28 05/15/2024    BUN 13 05/15/2024    CREATININE 0.97 05/15/2024    GLUC 89 05/15/2024    CALCIUM 9.3 05/15/2024    AST 21 05/15/2024    ALT 19 05/15/2024    ALKPHOS 66 05/15/2024    TP 7.6 05/15/2024    ALB 4.3 05/15/2024    TBILI 0.69 05/15/2024    CHOLESTEROL 191 05/15/2024    HDL 44 05/15/2024    TRIG 116 05/15/2024    LDLCALC 124 (H) 05/15/2024    NONHDLC 147 05/15/2024    [id number] 5.162 (H) 05/15/2024    FREET4 0.60 (L) 05/15/2024    SYPHILISAB Non-reactive 05/15/2024         Assessment & Plan   Principal Problem:    MDD (major depressive disorder), recurrent severe, without psychosis (HCC)  Active Problems:    Unspecified depressive disorder (HCC)    Medical clearance for psychiatric admission    Hyperlipidemia    Vitamin D deficiency    B12 deficiency    Hypothyroidism    Rule Out Autism spectrum disorder    Recommended Treatment:   Continue Zoloft 125 mg daily.  Continue Abilify 2 mg daily.  Continue all other medications at current doses as above.  Encourage group therapy, milieu therapy and occupational therapy  All current active medications have been reviewed  Encourage group therapy, milieu therapy and occupational therapy  Behavioral Health checks every 7 minutes  Await placement    ----------------------------------------    Progress Toward Goals: progressing    Risks / Benefits of Treatment:    Risks, benefits,  and possible side effects of medications explained to patient and patient verbalizes understanding and agreement for treatment.    Counseling / Coordination of Care:    Patient's progress discussed with staff in treatment team meeting.  Medications, treatment progress and treatment plan reviewed with patient.    Basilio Panda MD 06/17/24

## 2024-06-17 NOTE — PLAN OF CARE
Pt intermittent in group attendance. Pt's participation has improved since admission. Pt appears to be more comfortable in a group setting and has been able to better identify and express how he is feeling.   Improved

## 2024-06-18 PROCEDURE — 99232 SBSQ HOSP IP/OBS MODERATE 35: CPT | Performed by: STUDENT IN AN ORGANIZED HEALTH CARE EDUCATION/TRAINING PROGRAM

## 2024-06-18 RX ORDER — ARIPIPRAZOLE 5 MG/1
5 TABLET ORAL DAILY
Status: DISPENSED | OUTPATIENT
Start: 2024-06-19

## 2024-06-18 RX ADMIN — ATORVASTATIN CALCIUM 10 MG: 10 TABLET, FILM COATED ORAL at 17:26

## 2024-06-18 RX ADMIN — ARIPIPRAZOLE 2 MG: 2 TABLET ORAL at 09:24

## 2024-06-18 RX ADMIN — CYANOCOBALAMIN TAB 1000 MCG 1000 MCG: 1000 TAB at 09:24

## 2024-06-18 RX ADMIN — SERTRALINE HYDROCHLORIDE 125 MG: 100 TABLET ORAL at 09:24

## 2024-06-18 RX ADMIN — LEVOTHYROXINE SODIUM 25 MCG: 25 TABLET ORAL at 06:16

## 2024-06-18 NOTE — PROGRESS NOTES
06/18/24 0998   Team Meeting   Meeting Type Daily Rounds   Team Members Present   Team Members Present Physician;;Nurse   Physician Team Member Amarilys   Nursing Team Member [facility] Management Team Member Noa   Patient/Family Present   Patient Present No   Patient's Family Present No     Pt is visible, pleasant, and cooperative. Pt is medication and meal compliant. Pt's discharge is pending CRR.

## 2024-06-18 NOTE — PLAN OF CARE
Problem: Ineffective Coping  Goal: Identifies ineffective coping skills  Outcome: Progressing  Goal: Identifies healthy coping skills  Outcome: Progressing  Goal: Demonstrates healthy coping skills  Outcome: Progressing  Goal: Participates in unit activities  Description: Interventions:  - Provide therapeutic environment   - Provide required programming   - Redirect inappropriate behaviors   Outcome: Progressing     Problem: Risk for Self Injury/Neglect  Goal: Treatment Goal: Remain safe during length of stay, learn and adopt new coping skills, and be free of self-injurious ideation, impulses and acts at the time of discharge  Outcome: Progressing     Problem: Depression  Goal: Treatment Goal: Demonstrate behavioral control of depressive symptoms, verbalize feelings of improved mood/affect, and adopt new coping skills prior to discharge  Outcome: Progressing  Goal: Verbalize thoughts and feelings  Description: Interventions:  - Assess and re-assess patient's level of risk   - Engage patient in 1:1 interactions, daily, for a minimum of 15 minutes   - Encourage patient to express feelings, fears, frustrations, hopes   Outcome: Progressing  Goal: Refrain from harming self  Description: Interventions:  - Monitor patient closely, per order   - Supervise medication ingestion, monitor effects and side effects   Outcome: Progressing  Goal: Refrain from isolation  Description: Interventions:  - Develop a trusting relationship   - Encourage socialization   Outcome: Progressing  Goal: Refrain from self-neglect  Outcome: Progressing  Goal: Attend and participate in unit activities, including therapeutic, recreational, and educational groups  Description: Interventions:  - Provide therapeutic and educational activities daily, encourage attendance and participation, and document same in the medical record   Outcome: Progressing  Goal: Complete daily ADLs, including personal hygiene independently, as able  Description:  Interventions:  - Observe, teach, and assist patient with ADLS  -  Monitor and promote a balance of rest/activity, with adequate nutrition and elimination   Outcome: Progressing     Problem: Anxiety  Goal: Anxiety is at manageable level  Description: Interventions:  - Assess and monitor patient's anxiety level.   - Monitor for signs and symptoms (heart palpitations, chest pain, shortness of breath, headaches, nausea, feeling jumpy, restlessness, irritable, apprehensive).   - Collaborate with interdisciplinary team and initiate plan and interventions as ordered.  - [place] patient to unit/surroundings  - Explain treatment plan  - Encourage participation in care  - Encourage verbalization of concerns/fears  - Identify coping mechanisms  - Assist in developing anxiety-reducing skills  - Administer/offer alternative therapies  - Limit or eliminate stimulants  Outcome: Progressing     Problem: DISCHARGE PLANNING - CARE MANAGEMENT  Goal: Discharge to post-acute care or home with appropriate resources  Description: INTERVENTIONS:  - Conduct assessment to determine patient/family and health care team treatment goals, and need for post-acute services based on payer coverage, community resources, and patient preferences, and barriers to discharge  - Address psychosocial, clinical, and financial barriers to discharge as identified in assessment in conjunction with the patient/family and health care team  - Arrange appropriate level of post-acute services according to patient’s   needs and preference and payer coverage in collaboration with the physician and health care team  - Communicate with and update the patient/family, physician, and health care team regarding progress on the discharge plan  - Arrange appropriate transportation to post-acute venues  Outcome: Progressing     Problem: Knowledge Deficit  Goal: Patient/family/caregiver demonstrates understanding of disease process, treatment plan, medications, and discharge  instructions  Description: Complete learning assessment and assess knowledge base.  Interventions:  - Provide teaching at level of understanding  - Provide teaching via preferred learning methods  Outcome: Progressing

## 2024-06-18 NOTE — PROGRESS NOTES
"Progress Note - Behavioral Health   Franco Roberson 39 y.o. male MRN: [id number]  Unit/Bed#: [facility] 352-02 Encounter: [id number]    Subjective:   Per nursing report, patient has been calm and in better spirits.  He has been behaviorally appropriate.  He remains medication compliant. Patient is doing slightly better today.  He reports that he is still having some feelings of anger which frustrated him.  Patient has been removing himself from situations where he is feeling angry.  He denies any current SI, HI, or AVH.  He reports tolerating his medications without any adverse effects noticed.  Patient agreeable to further titration of Abilify.    Behavior over the last 24 hours:  unchanged  Sleep: normal  Appetite: normal  Medication side effects: No  ROS: no complaints    Mental Status Evaluation:  Appearance:  dressed appropriately and adequately groomed   Behavior:  calm, pleasant, cooperative, and guarded   Speech:  soft and scant   Mood:  \"okay\"   Affect:  constricted   Thought Process:  linear and goal directed   Associations: concrete associations   Thought Content:  no overt delusions   Perceptual Disturbances: denies auditory or visual hallucinations when asked, does not appear responding to internal stimuli   Risk Potential: Suicidal ideation - None  Homicidal ideation - None  Potential for aggression - Not at present   Sensorium:  oriented to person, place, and time/date   Memory:  recent and remote memory grossly intact   Consciousness:  alert and awake   Attention/Concentration: attention span and concentration are age appropriate   Insight:  limited   Judgment: limited   Gait/Station: normal gait/station, normal balance   Motor Activity: no abnormal movements     Medications: all current active meds have been reviewed.  Current Facility-Administered Medications   Medication Dose Route Frequency Provider Last Rate    acetaminophen  650 mg Oral Q6H PRN Basilio Brown MD      acetaminophen  650 mg Oral Q4H PRN " Basilio Brown MD      acetaminophen  975 mg Oral Q6H PRN Basilio Brown MD      aluminum-magnesium hydroxide-simethicone  30 mL Oral Q4H PRN Basilio Brown MD      ARIPiprazole  2 mg Oral Daily Sherrill Davey DO      Artificial Tears  1 drop Both Eyes Q3H PRN Basilio Brown MD      atorvastatin  10 mg Oral Daily With Dinner WALLY Baptiste      benztropine  1 mg Intramuscular Q4H PRN Max 6/day Basilio Brown MD      benztropine  1 mg Oral Q4H PRN Max 6/day Basilio Brown MD      cyanocobalamin  1,000 mcg Oral Daily WALLY Baptiste      cyanocobalamin  1,000 mcg Intramuscular Once WALLY Baptiste      hydrOXYzine HCL  50 mg Oral Q6H PRN Max 4/day Basilio Brown MD      Or    diphenhydrAMINE  50 mg Intramuscular Q6H PRN Basilio Brown MD      ergocalciferol  50,000 Units Oral Weekly WALLY Baptiste      hydrOXYzine HCL  100 mg Oral Q6H PRN Max 4/day Basilio Brown MD      Or    LORazepam  2 mg Intramuscular Q6H PRN Basilio Brown MD      hydrOXYzine HCL  25 mg Oral Q6H PRN Max 4/day Basilio Brown MD      levothyroxine  25 mcg Oral Early Morning WALLY Baptiste      OLANZapine  5 mg Oral Q4H PRN Max 3/day Basilio Brown MD      Or    OLANZapine  2.5 mg Intramuscular Q4H PRN Max 3/day Basilio Brown MD      OLANZapine  5 mg Oral Q3H PRN Max 3/day Basilio Brown MD      Or    OLANZapine  5 mg Intramuscular Q3H PRN Max 3/day Basilio Brown MD      OLANZapine  2.5 mg Oral Q4H PRN Max 6/day Basilio Brown MD      polyethylene glycol  17 g Oral Daily PRN Basilio Brown MD      propranolol  10 mg Oral Q8H PRN Basilio Brown MD      senna-docusate sodium  1 tablet Oral Daily PRN Basilio Brown MD      sertraline  125 mg Oral Daily WALLY Gomez      traZODone  50 mg Oral HS PRN Basilio Brown MD         Labs: I have personally reviewed all pertinent  laboratory/tests results  Most Recent Labs:   Lab Results   Component Value Date    WBC 5.42 05/15/2024    RBC 5.05 05/15/2024    HGB 16.1 05/15/2024    HCT 49.4 (H) 05/15/2024     05/15/2024    RDW 12.6 05/15/2024    NEUTROABS 2.83 05/15/2024    SODIUM 142 05/15/2024    K 4.1 05/15/2024     05/15/2024    CO2 28 05/15/2024    BUN 13 05/15/2024    CREATININE 0.97 05/15/2024    GLUC 89 05/15/2024    CALCIUM 9.3 05/15/2024    AST 21 05/15/2024    ALT 19 05/15/2024    ALKPHOS 66 05/15/2024    TP 7.6 05/15/2024    ALB 4.3 05/15/2024    TBILI 0.69 05/15/2024    CHOLESTEROL 191 05/15/2024    HDL 44 05/15/2024    TRIG 116 05/15/2024    LDLCALC 124 (H) 05/15/2024    NONHDLC 147 05/15/2024    [id number] 5.162 (H) 05/15/2024    FREET4 0.60 (L) 05/15/2024    SYPHILISAB Non-reactive 05/15/2024         Assessment & Plan   Principal Problem:    MDD (major depressive disorder), recurrent severe, without psychosis (HCC)  Active Problems:    Unspecified depressive disorder (HCC)    Medical clearance for psychiatric admission    Hyperlipidemia    Vitamin D deficiency    B12 deficiency    Hypothyroidism    Rule Out Autism spectrum disorder    Recommended Treatment:   Continue Zoloft 125 mg daily.  Increase Abilify to 5 mg daily.  Continue all other medications at current doses as above.  Encourage group therapy, milieu therapy and occupational therapy  All current active medications have been reviewed  Encourage group therapy, milieu therapy and occupational therapy  Behavioral Health checks every 7 minutes    ----------------------------------------    Progress Toward Goals: progressing    Risks / Benefits of Treatment:    Risks, benefits, and possible side effects of medications explained to patient and patient verbalizes understanding and agreement for treatment.    Counseling / Coordination of Care:    Patient's progress discussed with staff in treatment team meeting.  Medications, treatment progress and treatment  plan reviewed with patient.    Basilio Panda MD 06/18/24

## 2024-06-18 NOTE — NURSING NOTE
Pt is visible on the unit but withdrawn to self. Able to make needs known. Pt denies SI/HI/AH/VH but reports being depressed about his situation right now. Pt medication compliant. Ate breakfast but refused lunch. Scant in conversation but with improved eye contact. Encouraged to attend groups. Denies any unmet needs or complaints at this time.

## 2024-06-18 NOTE — NURSING NOTE
"Pt visible on unit, pleasant, calm and cooperative when approached. Pt spoken openly about \"crumbling sometimes, because my mom was my last link of support.\" Pt is hopeful that Abilify will help to stabilize him. Pt denies SI/HI/AH/VH and current unmet needs.   "

## 2024-06-19 PROCEDURE — 99232 SBSQ HOSP IP/OBS MODERATE 35: CPT | Performed by: STUDENT IN AN ORGANIZED HEALTH CARE EDUCATION/TRAINING PROGRAM

## 2024-06-19 RX ADMIN — ARIPIPRAZOLE 5 MG: 5 TABLET ORAL at 08:16

## 2024-06-19 RX ADMIN — ERGOCALCIFEROL 50000 UNITS: 1.25 CAPSULE ORAL at 08:16

## 2024-06-19 RX ADMIN — ATORVASTATIN CALCIUM 10 MG: 10 TABLET, FILM COATED ORAL at 16:54

## 2024-06-19 RX ADMIN — LEVOTHYROXINE SODIUM 25 MCG: 25 TABLET ORAL at 06:47

## 2024-06-19 RX ADMIN — CYANOCOBALAMIN TAB 1000 MCG 1000 MCG: 1000 TAB at 08:16

## 2024-06-19 RX ADMIN — SERTRALINE HYDROCHLORIDE 125 MG: 100 TABLET ORAL at 08:16

## 2024-06-19 NOTE — PROGRESS NOTES
06/19/24 0933   Team Meeting   Meeting Type Daily Rounds   Team Members Present   Team Members Present Physician;Nurse;   Physician Team Member Amarilys   Nursing Team Member [facility] Management Team Member Suhas   Patient/Family Present   Patient Present No   Patient's Family Present No     Pt denies SI/HI. Guarded. Pt refusing dinner yesterday but accepted a snack later. Pt reporting significant anxiety. Discharge pending placement.

## 2024-06-19 NOTE — NURSING NOTE
Pt is visible on the unit and withdrawn to himself. Offered word searches, puzzles and coloring pages but pt declined. Denies SI/HI/AH/VH at this time. Med and meal compliant. Denies any unmet needs or complaints at this time.

## 2024-06-19 NOTE — PLAN OF CARE
"Pt has been attending groups less frequently. Pt reports he has hit a \"new low\". Pt reports he has been feeling significantly angry. Pt has been encouraged to talk with writer 1:1, pt has declined thus far. Pt has been encouraged to try journaling as he reported having lots of anger and thoughts in his mind, but did not want to talk about it. Pt declined.   "

## 2024-06-19 NOTE — PLAN OF CARE
Problem: Ineffective Coping  Goal: Identifies ineffective coping skills  Outcome: Progressing  Goal: Identifies healthy coping skills  Outcome: Progressing  Goal: Demonstrates healthy coping skills  Outcome: Progressing  Goal: Participates in unit activities  Description: Interventions:  - Provide therapeutic environment   - Provide required programming   - Redirect inappropriate behaviors   Outcome: Progressing     Problem: Risk for Self Injury/Neglect  Goal: Treatment Goal: Remain safe during length of stay, learn and adopt new coping skills, and be free of self-injurious ideation, impulses and acts at the time of discharge  Outcome: Progressing     Problem: Depression  Goal: Verbalize thoughts and feelings  Description: Interventions:  - Assess and re-assess patient's level of risk   - Engage patient in 1:1 interactions, daily, for a minimum of 15 minutes   - Encourage patient to express feelings, fears, frustrations, hopes   Outcome: Progressing  Goal: Refrain from harming self  Description: Interventions:  - Monitor patient closely, per order   - Supervise medication ingestion, monitor effects and side effects   Outcome: Progressing  Goal: Refrain from self-neglect  Outcome: Progressing  Goal: Attend and participate in unit activities, including therapeutic, recreational, and educational groups  Description: Interventions:  - Provide therapeutic and educational activities daily, encourage attendance and participation, and document same in the medical record   Outcome: Progressing  Goal: Complete daily ADLs, including personal hygiene independently, as able  Description: Interventions:  - Observe, teach, and assist patient with ADLS  -  Monitor and promote a balance of rest/activity, with adequate nutrition and elimination   Outcome: Progressing     Problem: Anxiety  Goal: Anxiety is at manageable level  Description: Interventions:  - Assess and monitor patient's anxiety level.   - Monitor for signs and  symptoms (heart palpitations, chest pain, shortness of breath, headaches, nausea, feeling jumpy, restlessness, irritable, apprehensive).   - Collaborate with interdisciplinary team and initiate plan and interventions as ordered.  - [place] patient to unit/surroundings  - Explain treatment plan  - Encourage participation in care  - Encourage verbalization of concerns/fears  - Identify coping mechanisms  - Assist in developing anxiety-reducing skills  - Administer/offer alternative therapies  - Limit or eliminate stimulants  Outcome: Progressing     Problem: Depression  Goal: Treatment Goal: Demonstrate behavioral control of depressive symptoms, verbalize feelings of improved mood/affect, and adopt new coping skills prior to discharge  Outcome: Not Progressing  Goal: Refrain from isolation  Description: Interventions:  - Develop a trusting relationship   - Encourage socialization   Outcome: Not Progressing

## 2024-06-19 NOTE — PROGRESS NOTES
"Progress Note - Behavioral Health   Franco Roberson 39 y.o. male MRN: [id number]  Unit/Bed#: [facility] 352-02 Encounter: [id number]    Subjective:   Per nursing report, patient has been calm and cooperative in the unit.  He has been guarded and constricted and has appeared frustrated.  He has been without any acute behavioral issues. Patient is feeling better today.  He reports feeling more \"mellow\".  He reports still having some feelings of depression when reflecting on his current life circumstances.  However, discussed with patient focusing on goals for things that he wants to accomplish once he is discharged and patient was receptive to this.  He denies any current SI, HI, or AVH.  He reports tolerating increased dose of Abilify without issue.    Behavior over the last 24 hours:  improved  Sleep: normal  Appetite: normal  Medication side effects: No  ROS: no complaints and all other systems are negative    Mental Status Evaluation:  Appearance:  dressed appropriately and marginal hygiene   Behavior:  calm, pleasant, cooperative, and guarded   Speech:  soft and scant   Mood:  \"better\"   Affect:  constricted, slightly brighter   Thought Process:  linear and goal directed   Associations: concrete associations   Thought Content:  no overt delusions   Perceptual Disturbances: denies auditory or visual hallucinations when asked, does not appear responding to internal stimuli   Risk Potential: Suicidal ideation - None  Homicidal ideation - None  Potential for aggression - Not at present   Sensorium:  oriented to person, place, and time/date   Memory:  recent and remote memory grossly intact   Consciousness:  alert and awake   Attention/Concentration: attention span and concentration are age appropriate   Insight:  limited   Judgment: limited   Gait/Station: normal gait/station, normal balance   Motor Activity: no abnormal movements     Medications: all current active meds have been reviewed.  Current Facility-Administered " Medications   Medication Dose Route Frequency Provider Last Rate    acetaminophen  650 mg Oral Q6H PRN Basilio Brown MD      acetaminophen  650 mg Oral Q4H PRN Basilio Brown MD      acetaminophen  975 mg Oral Q6H PRN Basilio Brown MD      aluminum-magnesium hydroxide-simethicone  30 mL Oral Q4H PRN Basilio Brown MD      ARIPiprazole  5 mg Oral Daily Basilio Panda MD      Artificial Tears  1 drop Both Eyes Q3H PRN Basilio Brown MD      atorvastatin  10 mg Oral Daily With Dinner WALLY Baptiste      benztropine  1 mg Intramuscular Q4H PRN Max 6/day Basilio Brown MD      benztropine  1 mg Oral Q4H PRN Max 6/day Basilio Brown MD      cyanocobalamin  1,000 mcg Oral Daily WALLY Baptiste      cyanocobalamin  1,000 mcg Intramuscular Once WALLY Baptiste      hydrOXYzine HCL  50 mg Oral Q6H PRN Max 4/day Basilio Brown MD      Or    diphenhydrAMINE  50 mg Intramuscular Q6H PRN Basilio Brown MD      ergocalciferol  50,000 Units Oral Weekly WALLY Baptiste      hydrOXYzine HCL  100 mg Oral Q6H PRN Max 4/day Basilio Brown MD      Or    LORazepam  2 mg Intramuscular Q6H PRN Basilio Brown MD      hydrOXYzine HCL  25 mg Oral Q6H PRN Max 4/day Basilio Brown MD      levothyroxine  25 mcg Oral Early Morning WALLY Baptiste      OLANZapine  5 mg Oral Q4H PRN Max 3/day Basilio Brown MD      Or    OLANZapine  2.5 mg Intramuscular Q4H PRN Max 3/day Basilio Brown MD      OLANZapine  5 mg Oral Q3H PRN Max 3/day Basilio Brown MD      Or    OLANZapine  5 mg Intramuscular Q3H PRN Max 3/day Basilio Brown MD      OLANZapine  2.5 mg Oral Q4H PRN Max 6/day Basilio Brown MD      polyethylene glycol  17 g Oral Daily PRN Basilio Brown MD      propranolol  10 mg Oral Q8H PRN Basilio Brown MD      senna-docusate sodium  1 tablet Oral Daily PRN Basilio Brown MD       sertraline  125 mg Oral Daily WALLY Gomez      traZODone  50 mg Oral HS PRN Basilio Brown MD         Labs: I have personally reviewed all pertinent laboratory/tests results  Most Recent Labs:   Lab Results   Component Value Date    WBC 5.42 05/15/2024    RBC 5.05 05/15/2024    HGB 16.1 05/15/2024    HCT 49.4 (H) 05/15/2024     05/15/2024    RDW 12.6 05/15/2024    NEUTROABS 2.83 05/15/2024    SODIUM 142 05/15/2024    K 4.1 05/15/2024     05/15/2024    CO2 28 05/15/2024    BUN 13 05/15/2024    CREATININE 0.97 05/15/2024    GLUC 89 05/15/2024    CALCIUM 9.3 05/15/2024    AST 21 05/15/2024    ALT 19 05/15/2024    ALKPHOS 66 05/15/2024    TP 7.6 05/15/2024    ALB 4.3 05/15/2024    TBILI 0.69 05/15/2024    CHOLESTEROL 191 05/15/2024    HDL 44 05/15/2024    TRIG 116 05/15/2024    LDLCALC 124 (H) 05/15/2024    NONHDLC 147 05/15/2024    [id number] 5.162 (H) 05/15/2024    FREET4 0.60 (L) 05/15/2024    SYPHILISAB Non-reactive 05/15/2024         Assessment & Plan   Principal Problem:    MDD (major depressive disorder), recurrent severe, without psychosis (HCC)  Active Problems:    Unspecified depressive disorder (HCC)    Medical clearance for psychiatric admission    Hyperlipidemia    Vitamin D deficiency    B12 deficiency    Hypothyroidism    Rule Out Autism spectrum disorder    Recommended Treatment:   Continue Zoloft 125 mg daily.  Continue Abilify 5 mg daily.  Continue all other medications at current doses as above.  Encourage group therapy, milieu therapy and occupational therapy  All current active medications have been reviewed  Encourage group therapy, milieu therapy and occupational therapy  Behavioral Health checks every 7 minutes    ----------------------------------------    Progress Toward Goals: progressing    Risks / Benefits of Treatment:    Risks, benefits, and possible side effects of medications explained to patient and patient verbalizes understanding and agreement for  treatment.    Counseling / Coordination of Care:    Patient's progress discussed with staff in treatment team meeting.  Medications, treatment progress and treatment plan reviewed with patient.    Basilio Panda MD 06/19/24

## 2024-06-19 NOTE — NURSING NOTE
Pt mainly isolative to room this late evening. Denies SI, HI and hallucinations on assessment. Appears more depressed than on last encounter. Less talkative, guarded with poor eye contact. Denies needs. Compliant with unit routines and care. Safety checks continue.

## 2024-06-20 ENCOUNTER — APPOINTMENT (INPATIENT)
Dept: RADIOLOGY | Facility: HOSPITAL | Age: 39
DRG: 751 | End: 2024-06-20
Payer: COMMERCIAL

## 2024-06-20 PROCEDURE — 99232 SBSQ HOSP IP/OBS MODERATE 35: CPT | Performed by: STUDENT IN AN ORGANIZED HEALTH CARE EDUCATION/TRAINING PROGRAM

## 2024-06-20 PROCEDURE — 73030 X-RAY EXAM OF SHOULDER: CPT

## 2024-06-20 RX ORDER — METHOCARBAMOL 500 MG/1
500 TABLET, FILM COATED ORAL EVERY 6 HOURS PRN
Status: DISPENSED | OUTPATIENT
Start: 2024-06-20

## 2024-06-20 RX ORDER — FAMOTIDINE 20 MG/1
20 TABLET, FILM COATED ORAL 2 TIMES DAILY
Status: DISPENSED | OUTPATIENT
Start: 2024-06-20

## 2024-06-20 RX ORDER — IBUPROFEN 400 MG/1
400 TABLET, FILM COATED ORAL 3 TIMES DAILY
Status: COMPLETED | OUTPATIENT
Start: 2024-06-20 | End: 2024-06-27

## 2024-06-20 RX ADMIN — IBUPROFEN 400 MG: 400 TABLET, FILM COATED ORAL at 21:14

## 2024-06-20 RX ADMIN — SERTRALINE HYDROCHLORIDE 125 MG: 100 TABLET ORAL at 08:34

## 2024-06-20 RX ADMIN — ATORVASTATIN CALCIUM 10 MG: 10 TABLET, FILM COATED ORAL at 17:14

## 2024-06-20 RX ADMIN — CYANOCOBALAMIN TAB 1000 MCG 1000 MCG: 1000 TAB at 08:34

## 2024-06-20 RX ADMIN — IBUPROFEN 400 MG: 400 TABLET, FILM COATED ORAL at 17:14

## 2024-06-20 RX ADMIN — LEVOTHYROXINE SODIUM 25 MCG: 25 TABLET ORAL at 06:23

## 2024-06-20 RX ADMIN — METHOCARBAMOL TABLETS 500 MG: 500 TABLET, COATED ORAL at 12:43

## 2024-06-20 RX ADMIN — FAMOTIDINE 20 MG: 20 TABLET ORAL at 17:14

## 2024-06-20 RX ADMIN — ARIPIPRAZOLE 5 MG: 5 TABLET ORAL at 08:33

## 2024-06-20 NOTE — PROGRESS NOTES
"Progress Note - Behavioral Health   Franco Roberson 39 y.o. male MRN: [id number]  Unit/Bed#: [facility] 352-02 Encounter: [id number]    Subjective:   Per nursing report, patient has been doing better.  He has been calm and cooperative.  He remains medication compliant.  No acute behavioral issues overnight. Patient is doing fairly well today.  He is pleasant and cooperative.  He reports that his mood continues to improve.  He denies any SI, HI, or AVH.  He reports tolerating his medications well.  He is hoping that he does not need to stay on Abilify long-term.  However, encouraged patient to continue taking in the meantime as this does appear to have benefit for him.  Patient expressed understanding and was in agreement.    Behavior over the last 24 hours:  unchanged  Sleep: normal  Appetite: normal  Medication side effects: No  ROS: no complaints and all other systems are negative    Mental Status Evaluation:  Appearance:  dressed appropriately and marginal hygiene   Behavior:  calm, pleasant, cooperative, and guarded   Speech:  soft and scant   Mood:  \"good\"   Affect:  constricted, slightly brighter   Thought Process:  linear and goal directed   Associations: concrete associations   Thought Content:  no overt delusions   Perceptual Disturbances: denies auditory or visual hallucinations when asked, does not appear responding to internal stimuli   Risk Potential: Suicidal ideation - None  Homicidal ideation - None  Potential for aggression - Not at present   Sensorium:  oriented to person, place, and time/date   Memory:  recent and remote memory grossly intact   Consciousness:  alert and awake   Attention/Concentration: attention span and concentration are age appropriate   Insight:  limited   Judgment: limited   Gait/Station: normal gait/station, normal balance   Motor Activity: no abnormal movements     Medications: all current active meds have been reviewed.  Current Facility-Administered Medications   Medication Dose Route " Frequency Provider Last Rate    acetaminophen  650 mg Oral Q6H PRN Basilio Brown MD      acetaminophen  650 mg Oral Q4H PRN Basilio Brown MD      acetaminophen  975 mg Oral Q6H PRN Basilio Brown MD      aluminum-magnesium hydroxide-simethicone  30 mL Oral Q4H PRN Basilio Brown MD      ARIPiprazole  5 mg Oral Daily Basilio Panda MD      Artificial Tears  1 drop Both Eyes Q3H PRN Basilio Brown MD      atorvastatin  10 mg Oral Daily With Dinner WALLY Baptiste      benztropine  1 mg Intramuscular Q4H PRN Max 6/day Basilio Brown MD      benztropine  1 mg Oral Q4H PRN Max 6/day Basilio Brown MD      cyanocobalamin  1,000 mcg Oral Daily WALLY Baptiste      cyanocobalamin  1,000 mcg Intramuscular Once WALLY Baptiste      hydrOXYzine HCL  50 mg Oral Q6H PRN Max 4/day Basilio Brown MD      Or    diphenhydrAMINE  50 mg Intramuscular Q6H PRN Basilio Brown MD      ergocalciferol  50,000 Units Oral Weekly WALLY Baptiste      hydrOXYzine HCL  100 mg Oral Q6H PRN Max 4/day Basilio Brown MD      Or    LORazepam  2 mg Intramuscular Q6H PRN Basilio Brown MD      hydrOXYzine HCL  25 mg Oral Q6H PRN Max 4/day Basilio Brown MD      levothyroxine  25 mcg Oral Early Morning WALLY Baptiste      OLANZapine  5 mg Oral Q4H PRN Max 3/day Basilio Brown MD      Or    OLANZapine  2.5 mg Intramuscular Q4H PRN Max 3/day Basilio Brown MD      OLANZapine  5 mg Oral Q3H PRN Max 3/day Basilio Brown MD      Or    OLANZapine  5 mg Intramuscular Q3H PRN Max 3/day Basilio Brown MD      OLANZapine  2.5 mg Oral Q4H PRN Max 6/day Basilio Brown MD      polyethylene glycol  17 g Oral Daily PRN Basilio Brown MD      propranolol  10 mg Oral Q8H PRN Basilio Brown MD      senna-docusate sodium  1 tablet Oral Daily PRN Basilio Brown MD      sertraline  125 mg Oral Daily  WALLY Gomez      traZODone  50 mg Oral HS PRN Basilio Brown MD         Labs: I have personally reviewed all pertinent laboratory/tests results  Most Recent Labs:   Lab Results   Component Value Date    WBC 5.42 05/15/2024    RBC 5.05 05/15/2024    HGB 16.1 05/15/2024    HCT 49.4 (H) 05/15/2024     05/15/2024    RDW 12.6 05/15/2024    NEUTROABS 2.83 05/15/2024    SODIUM 142 05/15/2024    K 4.1 05/15/2024     05/15/2024    CO2 28 05/15/2024    BUN 13 05/15/2024    CREATININE 0.97 05/15/2024    GLUC 89 05/15/2024    CALCIUM 9.3 05/15/2024    AST 21 05/15/2024    ALT 19 05/15/2024    ALKPHOS 66 05/15/2024    TP 7.6 05/15/2024    ALB 4.3 05/15/2024    TBILI 0.69 05/15/2024    CHOLESTEROL 191 05/15/2024    HDL 44 05/15/2024    TRIG 116 05/15/2024    LDLCALC 124 (H) 05/15/2024    NONHDLC 147 05/15/2024    [id number] 5.162 (H) 05/15/2024    FREET4 0.60 (L) 05/15/2024    SYPHILISAB Non-reactive 05/15/2024         Assessment & Plan   Principal Problem:    MDD (major depressive disorder), recurrent severe, without psychosis (HCC)  Active Problems:    Unspecified depressive disorder (HCC)    Medical clearance for psychiatric admission    Hyperlipidemia    Vitamin D deficiency    B12 deficiency    Hypothyroidism    Rule Out Autism spectrum disorder    Recommended Treatment:   Continue Zoloft 125 mg daily.  Continue Abilify 5 mg daily.  Continue all other medications at current doses as above.  Encourage group therapy, milieu therapy and occupational therapy  All current active medications have been reviewed  Encourage group therapy, milieu therapy and occupational therapy  Behavioral Health checks every 7 minutes    ----------------------------------------    Progress Toward Goals: progressing    Risks / Benefits of Treatment:    Risks, benefits, and possible side effects of medications explained to patient and patient verbalizes understanding and agreement for treatment.    Counseling /  Coordination of Care:    Patient's progress discussed with staff in treatment team meeting.  Medications, treatment progress and treatment plan reviewed with patient.    Basilio aPnda MD 06/20/24

## 2024-06-20 NOTE — NURSING NOTE
"Pt visible intermittently throughout evening, quiet and minimally interactive unless approached. Pt openly spoke to nurse, states today was \"better than yesterday.\" Pt denies SI/HI/AH/VH. Pt tells nurse that the ongoing pain in his right shoulder has been feeling worse and that he would like to see medical for it. Will pass along to days. Pt declined intervention at this time.   "

## 2024-06-20 NOTE — SOCIAL WORK
Pt's diagnosis was updated to recurrent and now qualifies Pt for services through the [facility] in [place]. Cm submitted original referral, and was denied due to Pt's diagnosis. Pt's diagnosis now qualifies him and Cm is working on another referral for the [facility].   Cm observed Pt on the unit being somewhat reserved but social with is peers.

## 2024-06-20 NOTE — PLAN OF CARE
Problem: Ineffective Coping  Goal: Identifies ineffective coping skills  Outcome: Progressing  Goal: Identifies healthy coping skills  Outcome: Progressing  Goal: Demonstrates healthy coping skills  Outcome: Progressing  Goal: Participates in unit activities  Description: Interventions:  - Provide therapeutic environment   - Provide required programming   - Redirect inappropriate behaviors   Outcome: Progressing     Problem: Risk for Self Injury/Neglect  Goal: Treatment Goal: Remain safe during length of stay, learn and adopt new coping skills, and be free of self-injurious ideation, impulses and acts at the time of discharge  Outcome: Progressing     Problem: Depression  Goal: Treatment Goal: Demonstrate behavioral control of depressive symptoms, verbalize feelings of improved mood/affect, and adopt new coping skills prior to discharge  Outcome: Progressing  Goal: Verbalize thoughts and feelings  Description: Interventions:  - Assess and re-assess patient's level of risk   - Engage patient in 1:1 interactions, daily, for a minimum of 15 minutes   - Encourage patient to express feelings, fears, frustrations, hopes   Outcome: Progressing  Goal: Refrain from harming self  Description: Interventions:  - Monitor patient closely, per order   - Supervise medication ingestion, monitor effects and side effects   Outcome: Progressing  Goal: Refrain from isolation  Description: Interventions:  - Develop a trusting relationship   - Encourage socialization   Outcome: Progressing  Goal: Refrain from self-neglect  Outcome: Progressing  Goal: Attend and participate in unit activities, including therapeutic, recreational, and educational groups  Description: Interventions:  - Provide therapeutic and educational activities daily, encourage attendance and participation, and document same in the medical record   Outcome: Progressing  Goal: Complete daily ADLs, including personal hygiene independently, as able  Description:  Interventions:  - Observe, teach, and assist patient with ADLS  -  Monitor and promote a balance of rest/activity, with adequate nutrition and elimination   Outcome: Progressing     Problem: Anxiety  Goal: Anxiety is at manageable level  Description: Interventions:  - Assess and monitor patient's anxiety level.   - Monitor for signs and symptoms (heart palpitations, chest pain, shortness of breath, headaches, nausea, feeling jumpy, restlessness, irritable, apprehensive).   - Collaborate with interdisciplinary team and initiate plan and interventions as ordered.  - [place] patient to unit/surroundings  - Explain treatment plan  - Encourage participation in care  - Encourage verbalization of concerns/fears  - Identify coping mechanisms  - Assist in developing anxiety-reducing skills  - Administer/offer alternative therapies  - Limit or eliminate stimulants  Outcome: Progressing     Problem: Knowledge Deficit  Goal: Patient/family/caregiver demonstrates understanding of disease process, treatment plan, medications, and discharge instructions  Description: Complete learning assessment and assess knowledge base.  Interventions:  - Provide teaching at level of understanding  - Provide teaching via preferred learning methods  Outcome: Progressing

## 2024-06-20 NOTE — NURSING NOTE
Pt denies SI/HI/Ah/VH. Pt appears depressed. Medication and meal compliant. Present in dayroom and milieu. Scant with communication.  Social with select peers. No further concerns as of present. Plan of care ongoing.

## 2024-06-20 NOTE — NURSING NOTE
Pt c/o worsening right shoulder pain unrelieved by Tylenol, medical made aware. PRN Robaxin 500mg ordered and administered at 1243. Xray of right shoulder ordered.

## 2024-06-20 NOTE — PROGRESS NOTES
06/20/24 0928   Team Meeting   Meeting Type Daily Rounds   Team Members Present   Team Members Present Physician;Nurse;   Physician Team Member Amarilys   [facility]   Patient/Family Present   Patient Present No   Patient's Family Present No     Pt minimally interactive with peers. Med/Meal compliant. Denies SI/HI/AVH. Discharge pending placement.

## 2024-06-20 NOTE — NURSING NOTE
Pt awake and alert, visible in the milieu and appropriate. Withdrawn to self. Pt compliant with scheduled medications and meals. Denies SI/HI/AVH at this time. Encouraged to attend groups. Denies any unmet needs. Q7 minute safety checks maintained.

## 2024-06-21 PROCEDURE — 97163 PT EVAL HIGH COMPLEX 45 MIN: CPT

## 2024-06-21 PROCEDURE — 99232 SBSQ HOSP IP/OBS MODERATE 35: CPT | Performed by: STUDENT IN AN ORGANIZED HEALTH CARE EDUCATION/TRAINING PROGRAM

## 2024-06-21 RX ADMIN — IBUPROFEN 400 MG: 400 TABLET, FILM COATED ORAL at 09:23

## 2024-06-21 RX ADMIN — CYANOCOBALAMIN TAB 1000 MCG 1000 MCG: 1000 TAB at 09:23

## 2024-06-21 RX ADMIN — IBUPROFEN 400 MG: 400 TABLET, FILM COATED ORAL at 21:57

## 2024-06-21 RX ADMIN — ARIPIPRAZOLE 5 MG: 5 TABLET ORAL at 09:23

## 2024-06-21 RX ADMIN — IBUPROFEN 400 MG: 400 TABLET, FILM COATED ORAL at 17:32

## 2024-06-21 RX ADMIN — FAMOTIDINE 20 MG: 20 TABLET ORAL at 09:23

## 2024-06-21 RX ADMIN — ATORVASTATIN CALCIUM 10 MG: 10 TABLET, FILM COATED ORAL at 17:32

## 2024-06-21 RX ADMIN — LEVOTHYROXINE SODIUM 25 MCG: 25 TABLET ORAL at 06:49

## 2024-06-21 RX ADMIN — FAMOTIDINE 20 MG: 20 TABLET ORAL at 17:32

## 2024-06-21 RX ADMIN — SERTRALINE HYDROCHLORIDE 125 MG: 100 TABLET ORAL at 09:23

## 2024-06-21 NOTE — NURSING NOTE
Patient states he is feeling better and feels there has been an improvement since last weekend.  Patient states he feels the Abilify is helping and his mood has improved.  He denies SI/HI and A/V hallucinations.  Patient is med/meal compliant.

## 2024-06-21 NOTE — PROGRESS NOTES
Progress Note - Behavioral Health   Franco Roberson 39 y.o. male MRN: [id number]  Unit/Bed#: [facility] 352-02 Encounter: [id number]    Subjective:   Per nursing report, patient has been doing well.  He was reported as having less sleep last night.  He has been calm and cooperative.  No acute behavioral issues overnight. Patient is pleasant and bright on approach.  He reports that he is doing well with his current medications and is tolerating these well.  He does feel that Abilify has been helping his mood.  He states that he is now 3 days in a row with improvements in his mood.  He denies any current SI, HI, or AVH.  He denies any questions or concerns at this time.    Behavior over the last 24 hours:  unchanged  Sleep: normal  Appetite: normal  Medication side effects: No  ROS: no complaints and all other systems are negative    Mental Status Evaluation:  Appearance:  dressed appropriately and marginal hygiene   Behavior:  calm, pleasant, cooperative, and guarded   Speech:  scant and slow   Mood:  euthymic   Affect:  normal range and intensity, appropriate   Thought Process:  linear and goal directed   Associations: intact associations   Thought Content:  no overt delusions   Perceptual Disturbances: denies auditory or visual hallucinations when asked, does not appear responding to internal stimuli   Risk Potential: Suicidal ideation - None  Homicidal ideation - None  Potential for aggression - Not at present   Sensorium:  oriented to person, place, and time/date   Memory:  recent and remote memory grossly intact   Consciousness:  alert and awake   Attention/Concentration: attention span and concentration are age appropriate   Insight:  limited   Judgment: limited   Gait/Station: normal gait/station, normal balance   Motor Activity: no abnormal movements     Medications: all current active meds have been reviewed.  Current Facility-Administered Medications   Medication Dose Route Frequency Provider Last Rate    acetaminophen   650 mg Oral Q6H PRN Basilio Brown MD      acetaminophen  650 mg Oral Q4H PRN Basilio Brown MD      acetaminophen  975 mg Oral Q6H PRN Basilio Brown MD      aluminum-magnesium hydroxide-simethicone  30 mL Oral Q4H PRN Basilio Brown MD      ARIPiprazole  5 mg Oral Daily Basilio Panda MD      Artificial Tears  1 drop Both Eyes Q3H PRN Basilio Brown MD      atorvastatin  10 mg Oral Daily With Dinner WALLY Baptiste      benztropine  1 mg Intramuscular Q4H PRN Max 6/day Basilio Brown MD      benztropine  1 mg Oral Q4H PRN Max 6/day Basilio Brown MD      cyanocobalamin  1,000 mcg Oral Daily WALLY Baptiste      cyanocobalamin  1,000 mcg Intramuscular Once WALLY Baptiste      Diclofenac Sodium  2 g Topical 4x Daily PRN WALLY Baptiste      hydrOXYzine HCL  50 mg Oral Q6H PRN Max 4/day Basilio Brown MD      Or    diphenhydrAMINE  50 mg Intramuscular Q6H PRN Basilio Brown MD      ergocalciferol  50,000 Units Oral Weekly WALLY Baptiste      famotidine  20 mg Oral BID WALLY Baptiste      hydrOXYzine HCL  100 mg Oral Q6H PRN Max 4/day Basilio Brown MD      Or    LORazepam  2 mg Intramuscular Q6H PRN Basilio Brown MD      hydrOXYzine HCL  25 mg Oral Q6H PRN Max 4/day Basilio Brown MD      ibuprofen  400 mg Oral TID WALLY Baptiste      levothyroxine  25 mcg Oral Early Morning WALLY Baptiste      methocarbamol  500 mg Oral Q6H PRN WALLY Baptiste      OLANZapine  5 mg Oral Q4H PRN Max 3/day Basilio Brown MD      Or    OLANZapine  2.5 mg Intramuscular Q4H PRN Max 3/day Basilio Brown MD      OLANZapine  5 mg Oral Q3H PRN Max 3/day Basilio Brown MD      Or    OLANZapine  5 mg Intramuscular Q3H PRN Max 3/day Basilio Brown MD      OLANZapine  2.5 mg Oral Q4H PRN Max 6/day Basilio Brown MD      polyethylene  glycol  17 g Oral Daily PRN Basilio Brown MD      propranolol  10 mg Oral Q8H PRN Basilio Brown MD      senna-docusate sodium  1 tablet Oral Daily PRN Basilio Brown MD      sertraline  125 mg Oral Daily WALLY Gomez      traZODone  50 mg Oral HS PRN Basilio Brown MD         Labs: I have personally reviewed all pertinent laboratory/tests results  Most Recent Labs:   Lab Results   Component Value Date    WBC 5.42 05/15/2024    RBC 5.05 05/15/2024    HGB 16.1 05/15/2024    HCT 49.4 (H) 05/15/2024     05/15/2024    RDW 12.6 05/15/2024    NEUTROABS 2.83 05/15/2024    SODIUM 142 05/15/2024    K 4.1 05/15/2024     05/15/2024    CO2 28 05/15/2024    BUN 13 05/15/2024    CREATININE 0.97 05/15/2024    GLUC 89 05/15/2024    CALCIUM 9.3 05/15/2024    AST 21 05/15/2024    ALT 19 05/15/2024    ALKPHOS 66 05/15/2024    TP 7.6 05/15/2024    ALB 4.3 05/15/2024    TBILI 0.69 05/15/2024    CHOLESTEROL 191 05/15/2024    HDL 44 05/15/2024    TRIG 116 05/15/2024    LDLCALC 124 (H) 05/15/2024    NONHDLC 147 05/15/2024    [id number] 5.162 (H) 05/15/2024    FREET4 0.60 (L) 05/15/2024    SYPHILISAB Non-reactive 05/15/2024         Assessment & Plan   Principal Problem:    MDD (major depressive disorder), recurrent severe, without psychosis (HCC)  Active Problems:    Unspecified depressive disorder (HCC)    Medical clearance for psychiatric admission    Hyperlipidemia    Vitamin D deficiency    B12 deficiency    Hypothyroidism    Rule Out Autism spectrum disorder    Recommended Treatment:   Continue Zoloft 125 mg daily.  Continue Abilify 5 mg daily.  Continue all other medications at current doses as above.  Encourage group therapy, milieu therapy and occupational therapy  All current active medications have been reviewed  Encourage group therapy, milieu therapy and occupational therapy  Behavioral Health checks every 7 minutes    ----------------------------------------    Progress Toward  Goals: progressing    Risks / Benefits of Treatment:    Risks, benefits, and possible side effects of medications explained to patient and patient verbalizes understanding and agreement for treatment.    Counseling / Coordination of Care:    Patient's progress discussed with staff in treatment team meeting.  Medications, treatment progress and treatment plan reviewed with patient.    Basilio Panda MD 06/21/24

## 2024-06-21 NOTE — QUICK NOTE
Notified by nursing regarding right shoulder pain. Xray shows rotator cuff calcific tendonitis. Will add scheduled Motrin + pepcid and consult PT. Patient can follow-up with ortho as an outpatient.

## 2024-06-21 NOTE — PROGRESS NOTES
06/21/24 6710   Team Meeting   Meeting Type Daily Rounds   Team Members Present   Team Members Present Physician;;Nurse   Physician Team Member Amarilys   Nursing Team Member [facility] Management Team Member Noa   Patient/Family Present   Patient Present No   Patient's Family Present No     Pt is medication and meal compliant. Pt is calm and cooperative. Pt is denying SI/HI/AVH. Pt's discharge is to be determined pending group home placement through [facility].

## 2024-06-21 NOTE — PLAN OF CARE
Pt reported his mood has been feeling slightly better over the last few days and has been able to attend and remain in groups. Pt not participating as much as previously.

## 2024-06-21 NOTE — PLAN OF CARE
Problem: Ineffective Coping  Goal: Identifies ineffective coping skills  Outcome: Progressing  Goal: Identifies healthy coping skills  Outcome: Progressing  Goal: Demonstrates healthy coping skills  Outcome: Progressing  Goal: Participates in unit activities  Description: Interventions:  - Provide therapeutic environment   - Provide required programming   - Redirect inappropriate behaviors   Outcome: Progressing     Problem: Risk for Self Injury/Neglect  Goal: Treatment Goal: Remain safe during length of stay, learn and adopt new coping skills, and be free of self-injurious ideation, impulses and acts at the time of discharge  Outcome: Progressing     Problem: Depression  Goal: Treatment Goal: Demonstrate behavioral control of depressive symptoms, verbalize feelings of improved mood/affect, and adopt new coping skills prior to discharge  Outcome: Progressing  Goal: Verbalize thoughts and feelings  Description: Interventions:  - Assess and re-assess patient's level of risk   - Engage patient in 1:1 interactions, daily, for a minimum of 15 minutes   - Encourage patient to express feelings, fears, frustrations, hopes   Outcome: Progressing  Goal: Refrain from harming self  Description: Interventions:  - Monitor patient closely, per order   - Supervise medication ingestion, monitor effects and side effects   Outcome: Progressing  Goal: Refrain from isolation  Description: Interventions:  - Develop a trusting relationship   - Encourage socialization   Outcome: Progressing  Goal: Refrain from self-neglect  Outcome: Progressing  Goal: Attend and participate in unit activities, including therapeutic, recreational, and educational groups  Description: Interventions:  - Provide therapeutic and educational activities daily, encourage attendance and participation, and document same in the medical record   Outcome: Progressing  Goal: Complete daily ADLs, including personal hygiene independently, as able  Description:  Interventions:  - Observe, teach, and assist patient with ADLS  -  Monitor and promote a balance of rest/activity, with adequate nutrition and elimination   Outcome: Progressing     Problem: Anxiety  Goal: Anxiety is at manageable level  Description: Interventions:  - Assess and monitor patient's anxiety level.   - Monitor for signs and symptoms (heart palpitations, chest pain, shortness of breath, headaches, nausea, feeling jumpy, restlessness, irritable, apprehensive).   - Collaborate with interdisciplinary team and initiate plan and interventions as ordered.  - [place] patient to unit/surroundings  - Explain treatment plan  - Encourage participation in care  - Encourage verbalization of concerns/fears  - Identify coping mechanisms  - Assist in developing anxiety-reducing skills  - Administer/offer alternative therapies  - Limit or eliminate stimulants  Outcome: Progressing     Problem: Knowledge Deficit  Goal: Patient/family/caregiver demonstrates understanding of disease process, treatment plan, medications, and discharge instructions  Description: Complete learning assessment and assess knowledge base.  Interventions:  - Provide teaching at level of understanding  - Provide teaching via preferred learning methods  Outcome: Progressing

## 2024-06-21 NOTE — NURSING NOTE
Pt visible, pleasant, calm, cooperative. Pt is social with select peers, and willingly offers to help staff on unit. Pt appears to have depressed affect, denies SI/HI/AH/VH. Pt states that his newly-scheduled ibuprofen has been helping to manage his R-shoulder pain.

## 2024-06-22 PROCEDURE — 99232 SBSQ HOSP IP/OBS MODERATE 35: CPT | Performed by: PSYCHIATRY & NEUROLOGY

## 2024-06-22 RX ADMIN — SERTRALINE HYDROCHLORIDE 125 MG: 100 TABLET ORAL at 12:19

## 2024-06-22 RX ADMIN — FAMOTIDINE 20 MG: 20 TABLET ORAL at 12:19

## 2024-06-22 RX ADMIN — IBUPROFEN 400 MG: 400 TABLET, FILM COATED ORAL at 21:17

## 2024-06-22 RX ADMIN — LEVOTHYROXINE SODIUM 25 MCG: 25 TABLET ORAL at 06:56

## 2024-06-22 RX ADMIN — FAMOTIDINE 20 MG: 20 TABLET ORAL at 18:07

## 2024-06-22 RX ADMIN — ARIPIPRAZOLE 5 MG: 5 TABLET ORAL at 12:19

## 2024-06-22 RX ADMIN — CYANOCOBALAMIN TAB 1000 MCG 1000 MCG: 1000 TAB at 12:20

## 2024-06-22 RX ADMIN — IBUPROFEN 400 MG: 400 TABLET, FILM COATED ORAL at 12:20

## 2024-06-22 RX ADMIN — IBUPROFEN 400 MG: 400 TABLET, FILM COATED ORAL at 18:07

## 2024-06-22 RX ADMIN — ATORVASTATIN CALCIUM 10 MG: 10 TABLET, FILM COATED ORAL at 18:07

## 2024-06-22 NOTE — NURSING NOTE
Pt visible intermittently throughout evening. Pt pleasant and cooperative upon approach, smiling and joking with nurse. Pt states that PT exercises and scheduled motrin have greatly reduced pain in R-shoulder. Pt denies SI/HI/AH/VH and current unmet needs.

## 2024-06-22 NOTE — PROGRESS NOTES
Progress Note - Behavioral Health     Franco Roberson 39 y.o. male MRN: [id number]   Unit/Bed#: [facility] 352-02 Encounter: [id number]      Subjective: Patient chart reviewed and case discussed with the nurse.  The patient was seen in his room today.  The patient was very calm and cooperative during the meeting.  He reports his mood has been okay and rates depression at 2 on a scale of 0-10 with 10 being the worst.  Denies any suicidal thoughts or hopelessness today.  Reports sleep has been okay.  Appetite has been fine.  Reports energy level and concentration has been okay.  Denies any anxiety today.  Denies any auditory or visual hallucination.  Does not endorse any paranoia or delusional ideation.  Reports compliant with medication and denies any side effects.    As per the nurse patient last evening was visible intermittently in the milieu.  Pleasant and cooperative.  Pain and right shoulder has been much better especially with scheduled Motrin.  No SI or HI or hallucinations.    As per the weekday psychiatrist notes patient will be continued on Zoloft and Abilify with no change.      ROS: no complaints, all other systems are negative    Mental Status Evaluation:    Appearance:  casually dressed   Behavior:  cooperative, calm   Speech:  normal volume, normal pitch   Mood:  mildly depressed   Affect:  constricted   Thought Process:  linear   Associations: intact associations   Thought Content:  no overt delusions   Perceptual Disturbances: no auditory hallucinations, no visual hallucinations   Risk Potential: Suicidal ideation - None  Homicidal ideation - None  Potential for aggression - No   Sensorium:  oriented to person, place, and time/date   Memory:  recent and remote memory grossly intact   Consciousness:  alert and awake   Attention: decreased concentration   Insight:  fair   Judgment: fair   Gait/Station: in bed   Motor Activity: no abnormal movements     Vital signs in last 24 hours:    Temp:  [96.5 °F (35.8  °C)-97.1 °F (36.2 °C)] 96.5 °F (35.8 °C)  HR:  [73-75] 75  Resp:  [16] 16  BP: (135-137)/(81-82) 135/82    Laboratory results: I have personally reviewed all pertinent laboratory/tests results.  Most Recent Labs:   Lab Results   Component Value Date    WBC 5.42 05/15/2024    RBC 5.05 05/15/2024    HGB 16.1 05/15/2024    HCT 49.4 (H) 05/15/2024     05/15/2024    RDW 12.6 05/15/2024    NEUTROABS 2.83 05/15/2024    SODIUM 142 05/15/2024    K 4.1 05/15/2024     05/15/2024    CO2 28 05/15/2024    BUN 13 05/15/2024    CREATININE 0.97 05/15/2024    GLUC 89 05/15/2024    GLUF 89 05/15/2024    CALCIUM 9.3 05/15/2024    AST 21 05/15/2024    ALT 19 05/15/2024    ALKPHOS 66 05/15/2024    TP 7.6 05/15/2024    ALB 4.3 05/15/2024    TBILI 0.69 05/15/2024    CHOLESTEROL 191 05/15/2024    HDL 44 05/15/2024    TRIG 116 05/15/2024    LDLCALC 124 (H) 05/15/2024    NONHDLC 147 05/15/2024    MYR2FTRPSWLZ 5.162 (H) 05/15/2024    FREET4 0.60 (L) 05/15/2024       Progress Toward Goals: improving    Assessment & Plan the patient overall doing better today.  Denies any significant concerns.  The plan is to continue with the current medication with no changes.  We will continue to monitor the patient for his mood, behavior, safety, sleep and response to medication while he is here in the unit.  Principal Problem:    MDD (major depressive disorder), recurrent severe, without psychosis (HCC)  Active Problems:    Unspecified depressive disorder (HCC)    Medical clearance for psychiatric admission    Hyperlipidemia    Vitamin D deficiency    B12 deficiency    Hypothyroidism    Rule Out Autism spectrum disorder    Recommended Treatment:     Planned medication and treatment changes:    All current active medications have been reviewed  Encourage group therapy, milieu therapy and occupational therapy  Behavioral Health checks every 7 minutes  Continue current medications:  Current Facility-Administered Medications   Medication Dose Route  Frequency Provider Last Rate    acetaminophen  650 mg Oral Q6H PRN Basilio Brown MD      acetaminophen  650 mg Oral Q4H PRN Basilio Brown MD      acetaminophen  975 mg Oral Q6H PRN Basilio Brown MD      aluminum-magnesium hydroxide-simethicone  30 mL Oral Q4H PRN Basilio Brown MD      ARIPiprazole  5 mg Oral Daily Basilio Panda MD      Artificial Tears  1 drop Both Eyes Q3H PRN Basilio Brown MD      atorvastatin  10 mg Oral Daily With Dinner WALLY Baptiste      benztropine  1 mg Intramuscular Q4H PRN Max 6/day Basilio Brown MD      benztropine  1 mg Oral Q4H PRN Max 6/day Basilio Brown MD      cyanocobalamin  1,000 mcg Oral Daily WALLY Baptiste      cyanocobalamin  1,000 mcg Intramuscular Once WALLY Baptiste      Diclofenac Sodium  2 g Topical 4x Daily PRN WALLY Baptiste      hydrOXYzine HCL  50 mg Oral Q6H PRN Max 4/day Basilio Brown MD      Or    diphenhydrAMINE  50 mg Intramuscular Q6H PRN Basilio Brown MD      ergocalciferol  50,000 Units Oral Weekly WALLY Baptiste      famotidine  20 mg Oral BID WALLY Baptiste      hydrOXYzine HCL  100 mg Oral Q6H PRN Max 4/day Basilio Brown MD      Or    LORazepam  2 mg Intramuscular Q6H PRN Basilio Brown MD      hydrOXYzine HCL  25 mg Oral Q6H PRN Max 4/day Basilio Brown MD      ibuprofen  400 mg Oral TID WALLY Baptiste      levothyroxine  25 mcg Oral Early Morning WALLY Baptiste      methocarbamol  500 mg Oral Q6H PRN WALLY Baptiste      OLANZapine  5 mg Oral Q4H PRN Max 3/day Basilio Brown MD      Or    OLANZapine  2.5 mg Intramuscular Q4H PRN Max 3/day Basilio Brown MD      OLANZapine  5 mg Oral Q3H PRN Max 3/day Basilio Brown MD      Or    OLANZapine  5 mg Intramuscular Q3H PRN Max 3/day Basilio Brown MD      OLANZapine  2.5 mg Oral Q4H PRN Max  6/day Basilio Brown MD      polyethylene glycol  17 g Oral Daily PRN Basilio Brown MD      propranolol  10 mg Oral Q8H PRN Basilio Brown MD      senna-docusate sodium  1 tablet Oral Daily PRN Basilio Brown MD      sertraline  125 mg Oral Daily WALLY Gomez      traZODone  50 mg Oral HS PRN Basilio Brown MD         Risks / Benefits of Treatment:    Risks, benefits, and possible side effects of medications explained to patient and patient verbalizes understanding and agreement for treatment.    Counseling / Coordination of Care:    Patient's progress discussed with staff in treatment team meeting.  Medications, treatment progress and treatment plan reviewed with patient.    Aniyah Gallagher MD 06/22/24

## 2024-06-23 PROCEDURE — 99232 SBSQ HOSP IP/OBS MODERATE 35: CPT | Performed by: PSYCHIATRY & NEUROLOGY

## 2024-06-23 RX ADMIN — ATORVASTATIN CALCIUM 10 MG: 10 TABLET, FILM COATED ORAL at 16:31

## 2024-06-23 RX ADMIN — ARIPIPRAZOLE 5 MG: 5 TABLET ORAL at 08:23

## 2024-06-23 RX ADMIN — SERTRALINE HYDROCHLORIDE 125 MG: 100 TABLET ORAL at 08:24

## 2024-06-23 RX ADMIN — FAMOTIDINE 20 MG: 20 TABLET ORAL at 08:24

## 2024-06-23 RX ADMIN — IBUPROFEN 400 MG: 400 TABLET, FILM COATED ORAL at 21:05

## 2024-06-23 RX ADMIN — LEVOTHYROXINE SODIUM 25 MCG: 25 TABLET ORAL at 06:32

## 2024-06-23 RX ADMIN — IBUPROFEN 400 MG: 400 TABLET, FILM COATED ORAL at 16:31

## 2024-06-23 RX ADMIN — IBUPROFEN 400 MG: 400 TABLET, FILM COATED ORAL at 08:23

## 2024-06-23 RX ADMIN — FAMOTIDINE 20 MG: 20 TABLET ORAL at 17:08

## 2024-06-23 RX ADMIN — CYANOCOBALAMIN TAB 1000 MCG 1000 MCG: 1000 TAB at 08:24

## 2024-06-23 NOTE — NURSING NOTE
Patient reports feeling better.  He has been visible on the unit and makes his needs known.  He denies SI/HI and A/V hallucinations.  He is med/meal compliant.

## 2024-06-23 NOTE — NURSING NOTE
"Patient was visible during the evening. Cooperative with routine. Denied any unmet needs. Stated he is \"feeling much better than last week\" HS medication compliant.   "

## 2024-06-23 NOTE — PHYSICAL THERAPY NOTE
Physical Therapy Evaluation    Patient's Name: Franco Roberson    Admitting Diagnosis  Major depressive disorder, single episode, unspecified [F32.9]  Unspecified mood (affective) disorder (HCC) [F39]    Problem List  Patient Active Problem List   Diagnosis    Unspecified depressive disorder (HCC)    Medical clearance for psychiatric admission    Hyperlipidemia    Vitamin D deficiency    B12 deficiency    Hypothyroidism    Rule Out Autism spectrum disorder    MDD (major depressive disorder), recurrent severe, without psychosis (HCC)       Past Medical History  Past Medical History:   Diagnosis Date    Depression     Psychiatric disorder     Suicide attempt (HCC)        Past Surgical History  No past surgical history on file.    Recent Imaging  XR shoulder 2+ vw right   Final Result by Angie Diaz MD (06/20 1638)      No acute osseous abnormality.      Rotator cuff calcific tendinitis.         Workstation performed: [id number]             Recent Vital Signs  Vitals:    06/21/24 1523 06/22/24 0738 06/22/24 1512 06/23/24 0748   BP: 137/81 135/82 126/69 125/73   BP Location: Left arm Left arm Left arm Right arm   Pulse: 73 75 79 71   Resp: 16 16 16 16   Temp: (!) 97.1 °F (36.2 °C) (!) 96.5 °F (35.8 °C) (!) 97.2 °F (36.2 °C) 97.5 °F (36.4 °C)   TempSrc: Temporal Temporal Temporal Temporal   SpO2: 94% 98% 97% 98%   Weight:    130 kg (287 lb 3.2 oz)   Height:            06/21/24 1440   PT Last Visit   PT Visit Date 06/21/24   Note Type   Note type Evaluation   Pain Assessment   Pain Assessment Tool 0-10   Pain Score 5   Pain Location/Orientation Location: Shoulder;Orientation: Right   Restrictions/Precautions   Weight Bearing Precautions Per Order No   Other Precautions Pain   Cognition   Arousal/Participation Alert   Orientation Level Oriented X4   Memory Within functional limits   Following Commands Follows all commands and directions without difficulty   RUE Assessment   RUE Assessment X   RUE Overall AROM   R  Shoulder Flexion full but with painful arc   R Shoulder ABduction full but with painful arc   RUE Strength   R Shoulder Flexion 4/5   R Shoulder ABduction 4/5   R Shoulder Internal Rotation 4/5   R Shoulder External Rotation 3+/5   LUE Assessment   LUE Assessment WFL   RLE Assessment   RLE Assessment WFL   LLE Assessment   LLE Assessment WFL   Coordination   Movements are Fluid and Coordinated 0   Coordination and Movement Description mild coodination deficit   Sensation X   Light Touch   RLE Light Touch Grossly intact   LLE Light Touch Grossly intact   Bed Mobility   Supine to Sit 7  Independent   Sit to Supine 7  Independent   Transfers   Sit to Stand 7  Independent   Stand to Sit 7  Independent   Ambulation/Elevation   Gait pattern Step through pattern;Decreased toe off;Decreased heel strike;Decreased foot clearance   Gait Assistance 7  Independent   Assistive Device None   Distance 450ft   Balance   Static Sitting Fair +   Dynamic Sitting Fair +   Static Standing Fair   Dynamic Standing Fair   Ambulatory Fair   Endurance Deficit   Endurance Deficit Yes   Endurance Deficit Description reduced from baseline   Activity Tolerance   Activity Tolerance Patient tolerated treatment well   Medical Staff Made Aware spoke to CM   Nurse Made Aware spoke to RN   Assessment   Prognosis Good   Problem List Decreased strength;Decreased range of motion;Decreased endurance;Impaired balance;Decreased mobility;Decreased coordination;Pain;Impaired sensation   Barriers to Discharge Inaccessible home environment;Decreased caregiver support   Goals   Patient Goals less shoulder pain   Discharge Recommendation   Rehab Resource Intensity Level, PT III (Minimum Resource Intensity)  (OP f/u for shoulder pain)   AM-PAC Basic Mobility Inpatient   Turning in Flat Bed Without Bedrails 4   Lying on Back to Sitting on Edge of Flat Bed Without Bedrails 4   Moving Bed to Chair 4   Standing Up From Chair Using Arms 4   Walk in Room 4   Climb 3-5  Stairs With Railing 4   Basic Mobility Inpatient Raw Score 24   Basic Mobility Standardized Score 57.68   [facility] Highest Level Of Mobility   -HL Goal 8: Walk 250 feet or more   -[facility] Achieved 8: Walk 250 feet ot more   End of Consult   Patient Position at End of Consult Bedside chair;All needs within reach           ASSESSMENT                                                                                                                     Franco Roberson is a 39 y.o. male admitted to [facility] on 5/14/2024 for MDD (major depressive disorder), recurrent severe, without psychosis ([facility]). Pt  has a past medical history of Depression, Psychiatric disorder, and Suicide attempt ([facility]).. PT was consulted and pt was seen on 6/23/2024 for mobility assessment and d/c planning.   Pt presents seated in common area. Reporting shoulder pain and with xray showing calcific tendinosis. He has painful arc with flexion and abduction of the shoulder although with full AROM. Strength is decreased in the rotator cuff and with shoulder flexion and abduction due to pain. Will initiate HEP at this time and reassess modify as needed, pt should initiate OP treatment when D/C    Impairments limiting pt at this time include impaired balance, decreased endurance, decreased coordination, pain, and decreased strength. Pt is currently functioning at a independent level for bed mobility, independent level for transfers, independent level for ambulation with no assistive device, and independent for elevations. The patient's AM-PAC Basic Mobility Inpatient Short Form Raw Score is 24. A Raw score of greater than 16 suggests the patient may benefit from discharge to home. Please also refer to the recommendation of the Physical Therapist for safe discharge planning.    Recommendations                                                                                                              Pt to complete HEP, will reassess and modify periodically  while admitted, needs OP f/u at D/C.    DME: None    Discharge Disposition:  Home with Outpatient Physical Therapy       Robbie Albert PT, DPT

## 2024-06-23 NOTE — PROGRESS NOTES
Progress Note - Behavioral Health     Franco Roberson 39 y.o. male MRN: [id number]   Unit/Bed#: [facility] 352-02 Encounter: [id number]      Subjective: Patient chart reviewed and case discussed with the nurse.  The patient was seen in his room today.  The patient was very calm and cooperative during the meeting.  Reports he feels the same like yesterday.  He reports his mood has been okay and rates depression at 2 on a scale of 0-10 with 10 being the worst.  Denies any suicidal thoughts or hopelessness today.  Reports sleep has been okay.  Appetite has been fine.  Reports energy level and concentration has been okay.  Denies any anxiety today.  Denies any auditory or visual hallucination.  Does not endorse any paranoia or delusional ideation.  Reports compliant with medication and denies any side effects.    As per the nurse patient last evening was visible intermittently in the milieu.  Pleasant and cooperative. No SI or HI or hallucinations.    As per the weekday psychiatrist notes patient will be continued on Zoloft and Abilify with no change.      ROS: no complaints, all other systems are negative    Mental Status Evaluation:    Appearance:  casually dressed   Behavior:  cooperative, calm   Speech:  normal volume, normal pitch   Mood:  mildly depressed   Affect:  constricted   Thought Process:  linear   Associations: intact associations   Thought Content:  no overt delusions   Perceptual Disturbances: no auditory hallucinations, no visual hallucinations   Risk Potential: Suicidal ideation - None  Homicidal ideation - None  Potential for aggression - No   Sensorium:  oriented to person, place, and time/date   Memory:  recent and remote memory grossly intact   Consciousness:  alert and awake   Attention: decreased concentration   Insight:  fair   Judgment: fair   Gait/Station: in bed   Motor Activity: no abnormal movements     Vital signs in last 24 hours:    Temp:  [97.2 °F (36.2 °C)-97.5 °F (36.4 °C)] 97.5 °F (36.4  °C)  HR:  [71-79] 71  Resp:  [16] 16  BP: (125-126)/(69-73) 125/73    Laboratory results: I have personally reviewed all pertinent laboratory/tests results.  Most Recent Labs:   Lab Results   Component Value Date    WBC 5.42 05/15/2024    RBC 5.05 05/15/2024    HGB 16.1 05/15/2024    HCT 49.4 (H) 05/15/2024     05/15/2024    RDW 12.6 05/15/2024    NEUTROABS 2.83 05/15/2024    SODIUM 142 05/15/2024    K 4.1 05/15/2024     05/15/2024    CO2 28 05/15/2024    BUN 13 05/15/2024    CREATININE 0.97 05/15/2024    GLUC 89 05/15/2024    GLUF 89 05/15/2024    CALCIUM 9.3 05/15/2024    AST 21 05/15/2024    ALT 19 05/15/2024    ALKPHOS 66 05/15/2024    TP 7.6 05/15/2024    ALB 4.3 05/15/2024    TBILI 0.69 05/15/2024    CHOLESTEROL 191 05/15/2024    HDL 44 05/15/2024    TRIG 116 05/15/2024    LDLCALC 124 (H) 05/15/2024    NONHDLC 147 05/15/2024    [id number] 5.162 (H) 05/15/2024    FREET4 0.60 (L) 05/15/2024       Progress Toward Goals: improving    Assessment & Plan the patient overall doing better today.  Denies any significant concerns.  The plan is to continue with the current medication with no changes.  We will continue to monitor the patient for his mood, behavior, safety, sleep and response to medication while he is here in the unit.  Principal Problem:    MDD (major depressive disorder), recurrent severe, without psychosis (HCC)  Active Problems:    Unspecified depressive disorder (HCC)    Medical clearance for psychiatric admission    Hyperlipidemia    Vitamin D deficiency    B12 deficiency    Hypothyroidism    Rule Out Autism spectrum disorder    Recommended Treatment:     Planned medication and treatment changes:    All current active medications have been reviewed  Encourage group therapy, milieu therapy and occupational therapy  Behavioral Health checks every 7 minutes  Continue current medications:  Current Facility-Administered Medications   Medication Dose Route Frequency Provider Last Rate     acetaminophen  650 mg Oral Q6H PRN Basilio Brown MD      acetaminophen  650 mg Oral Q4H PRN Basilio Brown MD      acetaminophen  975 mg Oral Q6H PRN Basilio Brown MD      aluminum-magnesium hydroxide-simethicone  30 mL Oral Q4H PRN Basilio Brown MD      ARIPiprazole  5 mg Oral Daily Basilio Panda MD      Artificial Tears  1 drop Both Eyes Q3H PRN Basilio Brown MD      atorvastatin  10 mg Oral Daily With Dinner WALLY Baptiste      benztropine  1 mg Intramuscular Q4H PRN Max 6/day Basilio Brown MD      benztropine  1 mg Oral Q4H PRN Max 6/day Basilio Brown MD      cyanocobalamin  1,000 mcg Oral Daily WALLY Baptiste      cyanocobalamin  1,000 mcg Intramuscular Once WALLY Baptiste      Diclofenac Sodium  2 g Topical 4x Daily PRN WALLY Baptiste      hydrOXYzine HCL  50 mg Oral Q6H PRN Max 4/day Basilio Brown MD      Or    diphenhydrAMINE  50 mg Intramuscular Q6H PRN Basilio Brown MD      ergocalciferol  50,000 Units Oral Weekly WALLY Baptiste      famotidine  20 mg Oral BID WALLY Baptiste      hydrOXYzine HCL  100 mg Oral Q6H PRN Max 4/day Basilio Brown MD      Or    LORazepam  2 mg Intramuscular Q6H PRN Basilio Brown MD      hydrOXYzine HCL  25 mg Oral Q6H PRN Max 4/day Basilio Brown MD      ibuprofen  400 mg Oral TID WALLY Baptiste      levothyroxine  25 mcg Oral Early Morning WALLY Baptiste      methocarbamol  500 mg Oral Q6H PRN WALLY Baptiste      OLANZapine  5 mg Oral Q4H PRN Max 3/day Basilio Brown MD      Or    OLANZapine  2.5 mg Intramuscular Q4H PRN Max 3/day Basilio Brown MD      OLANZapine  5 mg Oral Q3H PRN Max 3/day Basilio Brown MD      Or    OLANZapine  5 mg Intramuscular Q3H PRN Max 3/day Basilio Brown MD      OLANZapine  2.5 mg Oral Q4H PRN Max 6/day Basilio Brown MD       polyethylene glycol  17 g Oral Daily PRN Basilio Brown MD      propranolol  10 mg Oral Q8H PRN Basilio Brown MD      senna-docusate sodium  1 tablet Oral Daily PRN Basilio Brown MD      sertraline  125 mg Oral Daily WALLY Gomez      traZODone  50 mg Oral HS PRN Basilio Brown MD         Risks / Benefits of Treatment:    Risks, benefits, and possible side effects of medications explained to patient and patient verbalizes understanding and agreement for treatment.    Counseling / Coordination of Care:    Patient's progress discussed with staff in treatment team meeting.  Medications, treatment progress and treatment plan reviewed with patient.    Aniyah Gallagher MD 06/23/24

## 2024-06-24 PROCEDURE — 99232 SBSQ HOSP IP/OBS MODERATE 35: CPT

## 2024-06-24 RX ADMIN — SERTRALINE HYDROCHLORIDE 125 MG: 100 TABLET ORAL at 08:19

## 2024-06-24 RX ADMIN — LEVOTHYROXINE SODIUM 25 MCG: 25 TABLET ORAL at 05:14

## 2024-06-24 RX ADMIN — CYANOCOBALAMIN TAB 1000 MCG 1000 MCG: 1000 TAB at 08:19

## 2024-06-24 RX ADMIN — IBUPROFEN 400 MG: 400 TABLET, FILM COATED ORAL at 21:05

## 2024-06-24 RX ADMIN — ATORVASTATIN CALCIUM 10 MG: 10 TABLET, FILM COATED ORAL at 17:25

## 2024-06-24 RX ADMIN — FAMOTIDINE 20 MG: 20 TABLET ORAL at 08:19

## 2024-06-24 RX ADMIN — IBUPROFEN 400 MG: 400 TABLET, FILM COATED ORAL at 08:19

## 2024-06-24 RX ADMIN — ARIPIPRAZOLE 5 MG: 5 TABLET ORAL at 08:19

## 2024-06-24 RX ADMIN — FAMOTIDINE 20 MG: 20 TABLET ORAL at 17:25

## 2024-06-24 RX ADMIN — IBUPROFEN 400 MG: 400 TABLET, FILM COATED ORAL at 17:25

## 2024-06-24 NOTE — NURSING NOTE
Pt is calm and cooperative upon approach. Pt is visible and social with select peers. Medication and meal compliant. Denies SI, HI, AH,and VH. Denies any needs at this time.

## 2024-06-24 NOTE — PROGRESS NOTES
"Progress Note - Behavioral Health   Franco Roberson 39 y.o. male MRN: [id number]  Unit/Bed#: [facility] 352-02 Encounter: [id number]      Assessment & Plan   Principal Problem:    MDD (major depressive disorder), recurrent severe, without psychosis (HCC)  Active Problems:    Unspecified depressive disorder (HCC)    Medical clearance for psychiatric admission    Hyperlipidemia    Vitamin D deficiency    B12 deficiency    Hypothyroidism    Rule Out Autism spectrum disorder      Subjective:  Patient was seen today for continuation of care, records reviewed and patient was discussed with the morning case review team.  Per staff, Franco was calm, visible and cooperative on the unit over the weekend.  No acute behavioral issues.     Franco was seen today for psychiatric follow-up.  On assessment today, Franco was seen in the hallway away from peers.  Franco was calm, pleasant and cooperative.  He states he is feeling \"really good\".  He thinks current medications has improved his mood significantly and he is more hopeful for the future.  He states he does not like being on the Abilify but is agreeable to continue current medications.  He denies any side effects from medications.  He has been sleeping well at night and has a good appetite.     Franco denies acute suicidal/self-harm ideation/intent/plan upon direct inquiry at this time.  Franco remains behaviorally appropriate, no agitation or aggression noted on exam or in report.  Franco also denies HI/AH/VH, and does not appear overtly manic.  No overt delusions or paranoia are verbalized.   Franco remains adherent to his current psychotropic medication regimen.    Recommended Treatment: Treatment plan and medication changes discussed and per the attending physician the plan is:    1.Continue with group therapy, milieu therapy and occupational therapy  2.Behavioral Health checks every 7 minutes  3.Continue frequent safety checks and vitals per unit protocol  4.Continue with SLIM " "medical management as indicated  5.Continue with current medication regimen: Zoloft 125mg PO daily and Abilify 5mg PO daily  6.Will review labs in the a.m.  7.Disposition Planning: Discharge planning and efforts remain ongoing    Vitals:  Vitals:    06/24/24 0708   BP: 138/71   Pulse: 71   Resp: 16   Temp: 97.6 °F (36.4 °C)   SpO2: 97%       Laboratory Results:  I have personally reviewed all pertinent laboratory/tests results.  Most Recent Labs:   Lab Results   Component Value Date    WBC 5.42 05/15/2024    RBC 5.05 05/15/2024    HGB 16.1 05/15/2024    HCT 49.4 (H) 05/15/2024     05/15/2024    RDW 12.6 05/15/2024    NEUTROABS 2.83 05/15/2024    SODIUM 142 05/15/2024    K 4.1 05/15/2024     05/15/2024    CO2 28 05/15/2024    BUN 13 05/15/2024    CREATININE 0.97 05/15/2024    GLUC 89 05/15/2024    GLUF 89 05/15/2024    CALCIUM 9.3 05/15/2024    AST 21 05/15/2024    ALT 19 05/15/2024    ALKPHOS 66 05/15/2024    TP 7.6 05/15/2024    ALB 4.3 05/15/2024    TBILI 0.69 05/15/2024    CHOLESTEROL 191 05/15/2024    HDL 44 05/15/2024    TRIG 116 05/15/2024    LDLCALC 124 (H) 05/15/2024    NONHDLC 147 05/15/2024    TUZ7EFZHCDIA 5.162 (H) 05/15/2024    FREET4 0.60 (L) 05/15/2024       Psychiatric Review of Systems:  Behavior over the last 24 hours:  improved.   Sleep: adequate  Appetite: adequate  Medication side effects:   ROS: no complaints, denies shortness of breath or chest pain and all other systems are negative for acute changes    Mental Status Evaluation:    Appearance:  marginal hygiene, wearing hospital clothes   Behavior:  cooperative, calm   Speech:  normal rate and volume, fluent, clear   Mood:  less depressed \"really good\"   Affect:  constricted   Thought Process:  goal directed, linear   Associations: intact associations   Thought Content:  no overt delusions   Perceptual Disturbances: denies auditory or visual hallucinations when asked, does not appear responding to internal stimuli   Risk " Potential: Suicidal ideation - None at present, contracts for safety on the unit, would talk to staff if not feeling safe on the unit  Homicidal ideation - None  Potential for aggression - No   Sensorium:  oriented to person, place, and situation   Memory:  recent memory intact   Consciousness:  alert and awake   Attention/Concentration: attention span and concentration are age appropriate   Insight:  limited   Judgment: limited   Gait/Station: normal gait/station   Motor Activity: no abnormal movements     Progress Toward Goals:   Franco is progressing towards goals of inpatient psychiatric treatment by continued medication compliance and is attending therapeutic modalities on the milieu. However, the patient continues to require inpatient psychiatric hospitalization for continued medication management and titration to optimize symptom reduction, improve sleep hygiene, and demonstrate adequate self-care.    Risk of Harm to Self:   Nursing Suicide Risk Assessment Last 24 hours: C-SSRS Risk (Since Last Contact)  Calculated C-SSRS Risk Score (Since Last Contact): No Risk Indicated  Current Specific Risk Factors include: diagnosis of mood disorder  Protective Factors: no current suicidal ideation, ability to communicate with staff on the unit, able to contract for safety on the unit, improved depressive symptoms, improved anxiety symptoms, responds to redirection  Based on today's assessment, Franco presents the following risk of harm to self: low    Risk of Harm to Others:  Nursing Homicide Risk Assessment: Violence Risk to Others: Denies within past 6 months  Current Specific Risk Factors include: multiple stressors, social difficulties  Protective Factors: no current homicidal ideation, compliant with medications on the unit as ordered, compliant with unit milieu, follows staff redirection  Based on today's assessment, Franco presents the following risk of harm to others: low    The following interventions are  recommended: behavioral checks every 7 minutes, continued hospitalization on locked unit        Behavioral Health Medications: all current active meds have been reviewed and continue current psychiatric medications.  Current Facility-Administered Medications   Medication Dose Route Frequency Provider Last Rate    acetaminophen  650 mg Oral Q6H PRN Basilio Brown MD      acetaminophen  650 mg Oral Q4H PRN Basilio Brown MD      acetaminophen  975 mg Oral Q6H PRN Basilio Brown MD      aluminum-magnesium hydroxide-simethicone  30 mL Oral Q4H PRN Basilio Brown MD      ARIPiprazole  5 mg Oral Daily Basilio Panda MD      Artificial Tears  1 drop Both Eyes Q3H PRN Basilio Brown MD      atorvastatin  10 mg Oral Daily With Dinner WALLY Baptiste      benztropine  1 mg Intramuscular Q4H PRN Max 6/day Basilio Brown MD      benztropine  1 mg Oral Q4H PRN Max 6/day Basilio Brown MD      cyanocobalamin  1,000 mcg Oral Daily WALLY Baptiste      cyanocobalamin  1,000 mcg Intramuscular Once WALLY Baptiste      Diclofenac Sodium  2 g Topical 4x Daily PRN WALLY Baptiste      hydrOXYzine HCL  50 mg Oral Q6H PRN Max 4/day Basilio Brown MD      Or    diphenhydrAMINE  50 mg Intramuscular Q6H PRN Basilio Brown MD      ergocalciferol  50,000 Units Oral Weekly WALLY Baptiste      famotidine  20 mg Oral BID WALLY Baptiste      hydrOXYzine HCL  100 mg Oral Q6H PRN Max 4/day aBsilio Brown MD      Or    LORazepam  2 mg Intramuscular Q6H PRN Basilio Brown MD      hydrOXYzine HCL  25 mg Oral Q6H PRN Max 4/day Basilio Brown MD      ibuprofen  400 mg Oral TID WALLY Baptiste      levothyroxine  25 mcg Oral Early Morning WALLY Baptiste      methocarbamol  500 mg Oral Q6H PRN WALLY Baptiste      OLANZapine  5 mg Oral Q4H PRN Max 3/day Basilio Garcia  MD Karyn      Or    OLANZapine  2.5 mg Intramuscular Q4H PRN Max 3/day Basilio Brown MD      OLANZapine  5 mg Oral Q3H PRN Max 3/day Basilio Brown MD      Or    OLANZapine  5 mg Intramuscular Q3H PRN Max 3/day Basilio Brown MD      OLANZapine  2.5 mg Oral Q4H PRN Max 6/day Basilio Brown MD      polyethylene glycol  17 g Oral Daily PRN Basilio Brown MD      propranolol  10 mg Oral Q8H PRN Basilio Brown MD      senna-docusate sodium  1 tablet Oral Daily PRN Basilio Brown MD      sertraline  125 mg Oral Daily WALLY Gomez      traZODone  50 mg Oral HS PRN Basilio Brown MD         Risks / Benefits of Treatment:  Risks, benefits, and possible side effects of medications explained to patient and patient verbalizes understanding and agreement for treatment.    Counseling / Coordination of Care:  Patient's progress reviewed with nursing staff.  Medications, treatment progress and treatment plan reviewed with patient.  Supportive counseling provided to the patient.    Total floor/unit time spent today 25 minutes. Greater than 50% of total time was spent with the patient and / or family counseling and / or coordination of care. A description of the counseling / coordination of care: medication education, treatment plan, supportive therapy.

## 2024-06-24 NOTE — PLAN OF CARE
Problem: Ineffective Coping  Goal: Identifies ineffective coping skills  Outcome: Progressing  Goal: Identifies healthy coping skills  Outcome: Progressing  Goal: Demonstrates healthy coping skills  Outcome: Progressing  Goal: Participates in unit activities  Description: Interventions:  - Provide therapeutic environment   - Provide required programming   - Redirect inappropriate behaviors   Outcome: Progressing      [FreeTextEntry1] : Surveillance pap.

## 2024-06-24 NOTE — NURSING NOTE
Patient remained visible on the unit throughout the evening. Pleasant and cooperative with routine. Denied any unmet needs. HS medication compliant.

## 2024-06-24 NOTE — PROGRESS NOTES
06/24/24 0926   Team Meeting   Meeting Type Daily Rounds   Team Members Present   Team Members Present Physician;Nurse;   Physician Team Member Lucero   Nursing Team Member Maryолег   Care Management Team Member Suhas   Patient/Family Present   Patient Present No   Patient's Family Present No     Visible on the unit, cooperative, reports feeling better than last week. Med/meal compliant. Discharge pending dispo

## 2024-06-25 PROCEDURE — 99232 SBSQ HOSP IP/OBS MODERATE 35: CPT

## 2024-06-25 RX ADMIN — IBUPROFEN 400 MG: 400 TABLET, FILM COATED ORAL at 08:45

## 2024-06-25 RX ADMIN — IBUPROFEN 400 MG: 400 TABLET, FILM COATED ORAL at 21:02

## 2024-06-25 RX ADMIN — CYANOCOBALAMIN TAB 1000 MCG 1000 MCG: 1000 TAB at 08:45

## 2024-06-25 RX ADMIN — ARIPIPRAZOLE 5 MG: 5 TABLET ORAL at 08:45

## 2024-06-25 RX ADMIN — IBUPROFEN 400 MG: 400 TABLET, FILM COATED ORAL at 17:00

## 2024-06-25 RX ADMIN — FAMOTIDINE 20 MG: 20 TABLET ORAL at 17:23

## 2024-06-25 RX ADMIN — ATORVASTATIN CALCIUM 10 MG: 10 TABLET, FILM COATED ORAL at 17:24

## 2024-06-25 RX ADMIN — LEVOTHYROXINE SODIUM 25 MCG: 25 TABLET ORAL at 06:13

## 2024-06-25 RX ADMIN — SERTRALINE HYDROCHLORIDE 125 MG: 100 TABLET ORAL at 08:45

## 2024-06-25 RX ADMIN — FAMOTIDINE 20 MG: 20 TABLET ORAL at 08:45

## 2024-06-25 NOTE — NURSING NOTE
Patient remained mostly isolative to his room throughout the evening. Cooperative with routine. Denied any unmet needs. HS medication compliant.

## 2024-06-25 NOTE — PLAN OF CARE
Problem: Ineffective Coping  Goal: Identifies ineffective coping skills  Outcome: Progressing  Goal: Identifies healthy coping skills  Outcome: Progressing  Goal: Demonstrates healthy coping skills  Outcome: Progressing  Goal: Participates in unit activities  Description: Interventions:  - Provide therapeutic environment   - Provide required programming   - Redirect inappropriate behaviors   Outcome: Progressing     Problem: Risk for Self Injury/Neglect  Goal: Treatment Goal: Remain safe during length of stay, learn and adopt new coping skills, and be free of self-injurious ideation, impulses and acts at the time of discharge  Outcome: Progressing     Problem: Depression  Goal: Treatment Goal: Demonstrate behavioral control of depressive symptoms, verbalize feelings of improved mood/affect, and adopt new coping skills prior to discharge  Outcome: Progressing  Goal: Verbalize thoughts and feelings  Description: Interventions:  - Assess and re-assess patient's level of risk   - Engage patient in 1:1 interactions, daily, for a minimum of 15 minutes   - Encourage patient to express feelings, fears, frustrations, hopes   Outcome: Progressing  Goal: Refrain from harming self  Description: Interventions:  - Monitor patient closely, per order   - Supervise medication ingestion, monitor effects and side effects   Outcome: Progressing  Goal: Refrain from isolation  Description: Interventions:  - Develop a trusting relationship   - Encourage socialization   Outcome: Progressing  Goal: Refrain from self-neglect  Outcome: Progressing  Goal: Attend and participate in unit activities, including therapeutic, recreational, and educational groups  Description: Interventions:  - Provide therapeutic and educational activities daily, encourage attendance and participation, and document same in the medical record   Outcome: Progressing  Goal: Complete daily ADLs, including personal hygiene independently, as able  Description:  Interventions:  - Observe, teach, and assist patient with ADLS  -  Monitor and promote a balance of rest/activity, with adequate nutrition and elimination   Outcome: Progressing     Problem: Anxiety  Goal: Anxiety is at manageable level  Description: Interventions:  - Assess and monitor patient's anxiety level.   - Monitor for signs and symptoms (heart palpitations, chest pain, shortness of breath, headaches, nausea, feeling jumpy, restlessness, irritable, apprehensive).   - Collaborate with interdisciplinary team and initiate plan and interventions as ordered.  - [place] patient to unit/surroundings  - Explain treatment plan  - Encourage participation in care  - Encourage verbalization of concerns/fears  - Identify coping mechanisms  - Assist in developing anxiety-reducing skills  - Administer/offer alternative therapies  - Limit or eliminate stimulants  Outcome: Progressing     Problem: Knowledge Deficit  Goal: Patient/family/caregiver demonstrates understanding of disease process, treatment plan, medications, and discharge instructions  Description: Complete learning assessment and assess knowledge base.  Interventions:  - Provide teaching at level of understanding  - Provide teaching via preferred learning methods  Outcome: Progressing      Stable

## 2024-06-25 NOTE — PROGRESS NOTES
06/24/24 1150   Activity/Group Checklist   Group Admission/Discharge  (Relapse prevention plan)   Attendance Attended   Attendance Duration (min) 0-15   Interactions Interacted appropriately   Affect/Mood Appropriate   Goals Achieved Identified feelings;Identified triggers;Identified relapse prevention strategies;Identified medication adherence strategies;Discussed coping strategies;Discussed safety plan;Discussed discharge plans;Identified resources and support systems;Able to engage in interactions;Able to listen to others;Able to reflect/comment on own behavior;Able to self-disclose;Able to recieve feedback     Relapse Prevention Plan completed with pt. Pt pleasant and cooperative. Pt completed plan appropriately with prompting. Pt notes he does not have any support system. Pt assured he will be connected to outpatient mental health supports prior to discharge.

## 2024-06-25 NOTE — PROGRESS NOTES
"Progress Note - Behavioral Health   Franco Roberson 39 y.o. male MRN: [id number]  Unit/Bed#: [facility] 352-02 Encounter: [id number]      Assessment & Plan   Principal Problem:    MDD (major depressive disorder), recurrent severe, without psychosis (HCC)  Active Problems:    Unspecified depressive disorder (HCC)    Medical clearance for psychiatric admission    Hyperlipidemia    Vitamin D deficiency    B12 deficiency    Hypothyroidism    Rule Out Autism spectrum disorder      Subjective:  Patient was seen today for continuation of care, records reviewed and patient was discussed with the morning case review team.  Per staff, Franco has been intermittently visible on the unit.  He is social with select peers.  He is meal and medication compliant.      Franco was seen today for psychiatric follow-up.  On assessment today, Franco was seen in the hallway away from peers.  Franco reports no changes since yesterday.  He reports showering \"a few days ago\".  He was encouraged to perform self care daily.  He reports he has been sleeping well at night and his appetite is good.  He denies any auditory or visual hallucinations and does not appear internally preoccupied.     Franco denies acute suicidal/self-harm ideation/intent/plan upon direct inquiry at this time.  Franco remains behaviorally appropriate, no agitation or aggression noted on exam or in report.  No overt delusions or paranoia are verbalized.   Franco remains adherent to his current psychotropic medication regimen.    Recommended Treatment: Treatment plan and medication changes discussed and per the attending physician the plan is:    1.Continue with group therapy, milieu therapy and occupational therapy  2.Behavioral Health checks every 7 minutes  3.Continue frequent safety checks and vitals per unit protocol  4.Continue with SLIM medical management as indicated  5.Continue with current medication regimen: Zoloft 125mg PO daily and Abilify 5mg PO daily  6.Will review labs " in the a.m.  7.Disposition Planning: Discharge planning and efforts remain ongoing    Vitals:  Vitals:    06/25/24 0710   BP: 118/72   Pulse: 71   Resp: 16   Temp: (!) 97.2 °F (36.2 °C)   SpO2: 98%       Laboratory Results:  I have personally reviewed all pertinent laboratory/tests results.  Most Recent Labs:   Lab Results   Component Value Date    WBC 5.42 05/15/2024    RBC 5.05 05/15/2024    HGB 16.1 05/15/2024    HCT 49.4 (H) 05/15/2024     05/15/2024    RDW 12.6 05/15/2024    NEUTROABS 2.83 05/15/2024    SODIUM 142 05/15/2024    K 4.1 05/15/2024     05/15/2024    CO2 28 05/15/2024    BUN 13 05/15/2024    CREATININE 0.97 05/15/2024    GLUC 89 05/15/2024    GLUF 89 05/15/2024    CALCIUM 9.3 05/15/2024    AST 21 05/15/2024    ALT 19 05/15/2024    ALKPHOS 66 05/15/2024    TP 7.6 05/15/2024    ALB 4.3 05/15/2024    TBILI 0.69 05/15/2024    CHOLESTEROL 191 05/15/2024    HDL 44 05/15/2024    TRIG 116 05/15/2024    LDLCALC 124 (H) 05/15/2024    NONHDLC 147 05/15/2024    [id number] 5.162 (H) 05/15/2024    FREET4 0.60 (L) 05/15/2024       Psychiatric Review of Systems:  Behavior over the last 24 hours:  continues to slowly improve  Sleep: adequate  Appetite: adequate  Medication side effects: denies  ROS: no complaints, denies shortness of breath or chest pain and all other systems are negative for acute changes    Mental Status Evaluation:    Appearance:  casually dressed, marginal hygiene   Behavior:  cooperative, calm   Speech:  normal rate and volume, fluent, clear   Mood:  less depressed    Affect:  constricted   Thought Process:  goal directed, linear   Associations: intact associations   Thought Content:  no overt delusions   Perceptual Disturbances: denies auditory or visual hallucinations when asked, does not appear responding to internal stimuli   Risk Potential: Suicidal ideation - None at present, contracts for safety on the unit, would talk to staff if not feeling safe on the unit  Homicidal  ideation - None  Potential for aggression - No   Sensorium:  oriented to person, place, and situation   Memory:  recent memory intact   Consciousness:  alert and awake   Attention/Concentration: attention span and concentration are age appropriate   Insight:  limited   Judgment: limited   Gait/Station: normal gait/station   Motor Activity: no abnormal movements     Progress Toward Goals:   Franco is progressing towards goals of inpatient psychiatric treatment by continued medication compliance and is attending therapeutic modalities on the milieu. However, the patient continues to require inpatient psychiatric hospitalization for continued medication management and titration to optimize symptom reduction, improve sleep hygiene, and demonstrate adequate self-care.    Risk of Harm to Self:   Nursing Suicide Risk Assessment Last 24 hours: C-SSRS Risk (Since Last Contact)  Calculated C-SSRS Risk Score (Since Last Contact): No Risk Indicated  Current Specific Risk Factors include: diagnosis of mood disorder  Protective Factors: no current suicidal ideation, ability to communicate with staff on the unit, able to contract for safety on the unit, improved depressive symptoms, improved anxiety symptoms, responds to redirection  Based on today's assessment, Franco presents the following risk of harm to self: low    Risk of Harm to Others:  Nursing Homicide Risk Assessment: Violence Risk to Others: Denies within past 6 months  Current Specific Risk Factors include: multiple stressors, social difficulties  Protective Factors: no current homicidal ideation, compliant with medications on the unit as ordered, compliant with unit milieu, follows staff redirection  Based on today's assessment, Franco presents the following risk of harm to others: low    The following interventions are recommended: behavioral checks every 7 minutes, continued hospitalization on locked unit        Behavioral Health Medications: all current active meds  have been reviewed and continue current psychiatric medications.  Current Facility-Administered Medications   Medication Dose Route Frequency Provider Last Rate    acetaminophen  650 mg Oral Q6H PRN Basilio Brown MD      acetaminophen  650 mg Oral Q4H PRN Basilio Brown MD      acetaminophen  975 mg Oral Q6H PRN Basilio Brown MD      aluminum-magnesium hydroxide-simethicone  30 mL Oral Q4H PRN Basilio Brown MD      ARIPiprazole  5 mg Oral Daily Basilio Panda MD      Artificial Tears  1 drop Both Eyes Q3H PRN Basilio Brown MD      atorvastatin  10 mg Oral Daily With Dinner WALLY Baptiste      benztropine  1 mg Intramuscular Q4H PRN Max 6/day Basilio Brown MD      benztropine  1 mg Oral Q4H PRN Max 6/day Basilio Brown MD      cyanocobalamin  1,000 mcg Oral Daily WALLY Baptiste      cyanocobalamin  1,000 mcg Intramuscular Once WALLY Baptiste      Diclofenac Sodium  2 g Topical 4x Daily PRN WALLY Baptiste      hydrOXYzine HCL  50 mg Oral Q6H PRN Max 4/day Basilio Brown MD      Or    diphenhydrAMINE  50 mg Intramuscular Q6H PRN Basilio Brown MD      ergocalciferol  50,000 Units Oral Weekly WALLY Baptiste      famotidine  20 mg Oral BID WALLY Baptiste      hydrOXYzine HCL  100 mg Oral Q6H PRN Max 4/day Basilio Brown MD      Or    LORazepam  2 mg Intramuscular Q6H PRN Basilio Brown MD      hydrOXYzine HCL  25 mg Oral Q6H PRN Max 4/day Basilio Brown MD      ibuprofen  400 mg Oral TID WALLY Baptiste      levothyroxine  25 mcg Oral Early Morning WALLY Baptiste      methocarbamol  500 mg Oral Q6H PRN WLALY Baptiste      OLANZapine  5 mg Oral Q4H PRN Max 3/day Basilio Brown MD      Or    OLANZapine  2.5 mg Intramuscular Q4H PRN Max 3/day Basilio Brown MD      OLANZapine  5 mg Oral Q3H PRN Max 3/day Bsailio Garcia  MD Karyn      Or    OLANZapine  5 mg Intramuscular Q3H PRN Max 3/day Basilio Brown MD      OLANZapine  2.5 mg Oral Q4H PRN Max 6/day Basilio Brown MD      polyethylene glycol  17 g Oral Daily PRN Basilio Brown MD      propranolol  10 mg Oral Q8H PRN Basilio Brown MD      senna-docusate sodium  1 tablet Oral Daily PRN Basilio Brown MD      sertraline  125 mg Oral Daily WALLY Gomez      traZODone  50 mg Oral HS PRN Basilio Brown MD         Risks / Benefits of Treatment:  Risks, benefits, and possible side effects of medications explained to patient and patient verbalizes understanding and agreement for treatment.    Counseling / Coordination of Care:  Patient's progress reviewed with nursing staff.  Medications, treatment progress and treatment plan reviewed with patient.  Supportive counseling provided to the patient.    Total floor/unit time spent today 25 minutes. Greater than 50% of total time was spent with the patient and / or family counseling and / or coordination of care. A description of the counseling / coordination of care: medication education, treatment plan, supportive therapy.

## 2024-06-25 NOTE — NURSING NOTE
Pt denies SI/HI/AH/VH.Pt has flat depressed affect. Medication and meal compliant. Present in dayroom and milieu. Social with select peers. Pt compliant with Lunch. Scant/guarded with communication. No further concerns as of present. Plan of care ongoing.

## 2024-06-25 NOTE — NURSING NOTE
Pt denies SI/HI/AH/VH. Present in dayroom and milieu. Social with select peers. Medication and meal compliant. Pt is calm and pleasant. Appears withdrawn at times. No further concerns as of present. Plan of care ongoing.

## 2024-06-25 NOTE — PROGRESS NOTES
06/25/24 0848   Team Meeting   Meeting Type Daily Rounds   Team Members Present   Team Members Present Physician;Nurse;   Physician Team Member Lucero   [facility] Suhas   Patient/Family Present   Patient Present No   Patient's Family Present No     Med/meal compliant. Visible on the unit intermittently. Discharge to be determined.

## 2024-06-26 LAB
AMORPH URATE CRY URNS QL MICRO: ABNORMAL /HPF
BACTERIA UR QL AUTO: ABNORMAL /HPF
BILIRUB UR QL STRIP: NEGATIVE
CLARITY UR: ABNORMAL
COLOR UR: YELLOW
GLUCOSE UR STRIP-MCNC: NEGATIVE MG/DL
HGB UR QL STRIP.AUTO: NEGATIVE
KETONES UR STRIP-MCNC: NEGATIVE MG/DL
LEUKOCYTE ESTERASE UR QL STRIP: NEGATIVE
MUCOUS THREADS UR QL AUTO: ABNORMAL
NITRITE UR QL STRIP: NEGATIVE
NON-SQ EPI CELLS URNS QL MICRO: ABNORMAL /HPF
PH UR STRIP.AUTO: 6 [PH]
PROT UR STRIP-MCNC: ABNORMAL MG/DL
RBC #/AREA URNS AUTO: ABNORMAL /HPF
SP GR UR STRIP.AUTO: 1.02 (ref 1–1.04)
UROBILINOGEN UA: NEGATIVE MG/DL
WBC #/AREA URNS AUTO: ABNORMAL /HPF

## 2024-06-26 PROCEDURE — 81003 URINALYSIS AUTO W/O SCOPE: CPT | Performed by: PSYCHIATRY & NEUROLOGY

## 2024-06-26 PROCEDURE — 99232 SBSQ HOSP IP/OBS MODERATE 35: CPT

## 2024-06-26 PROCEDURE — 81001 URINALYSIS AUTO W/SCOPE: CPT | Performed by: PSYCHIATRY & NEUROLOGY

## 2024-06-26 RX ADMIN — FAMOTIDINE 20 MG: 20 TABLET ORAL at 17:30

## 2024-06-26 RX ADMIN — IBUPROFEN 400 MG: 400 TABLET, FILM COATED ORAL at 22:16

## 2024-06-26 RX ADMIN — ARIPIPRAZOLE 5 MG: 5 TABLET ORAL at 08:10

## 2024-06-26 RX ADMIN — LEVOTHYROXINE SODIUM 25 MCG: 25 TABLET ORAL at 06:51

## 2024-06-26 RX ADMIN — CYANOCOBALAMIN TAB 1000 MCG 1000 MCG: 1000 TAB at 08:10

## 2024-06-26 RX ADMIN — FAMOTIDINE 20 MG: 20 TABLET ORAL at 08:10

## 2024-06-26 RX ADMIN — ERGOCALCIFEROL 50000 UNITS: 1.25 CAPSULE ORAL at 08:10

## 2024-06-26 RX ADMIN — IBUPROFEN 400 MG: 400 TABLET, FILM COATED ORAL at 17:30

## 2024-06-26 RX ADMIN — IBUPROFEN 400 MG: 400 TABLET, FILM COATED ORAL at 08:10

## 2024-06-26 RX ADMIN — SERTRALINE HYDROCHLORIDE 125 MG: 100 TABLET ORAL at 08:10

## 2024-06-26 RX ADMIN — ATORVASTATIN CALCIUM 10 MG: 10 TABLET, FILM COATED ORAL at 17:30

## 2024-06-26 NOTE — NURSING NOTE
Pt is calm and cooperative upon approach. Pt is visible at times in the milieu. Medication and meal compliant. Denies SI, HI, AH, and VH. Denies any needs at this time.

## 2024-06-26 NOTE — PROGRESS NOTES
06/26/24 0934   Team Meeting   Meeting Type Daily Rounds   Team Members Present   Team Members Present Physician;Nurse;   Physician Team Member Lucero   Nursing Team Member Maryолег   Care Management Team Member Suhas   Patient/Family Present   Patient Present No   Patient's Family Present No     Pt med/meal compliant. Visible on the unit, social with select peers. Discharge to be determined.

## 2024-06-26 NOTE — PLAN OF CARE
Problem: Ineffective Coping  Goal: Identifies ineffective coping skills  Outcome: Progressing  Goal: Identifies healthy coping skills  Outcome: Progressing  Goal: Demonstrates healthy coping skills  Outcome: Progressing  Goal: Participates in unit activities  Description: Interventions:  - Provide therapeutic environment   - Provide required programming   - Redirect inappropriate behaviors   Outcome: Progressing     Problem: Risk for Self Injury/Neglect  Goal: Treatment Goal: Remain safe during length of stay, learn and adopt new coping skills, and be free of self-injurious ideation, impulses and acts at the time of discharge  Outcome: Progressing     Problem: Depression  Goal: Treatment Goal: Demonstrate behavioral control of depressive symptoms, verbalize feelings of improved mood/affect, and adopt new coping skills prior to discharge  Outcome: Progressing  Goal: Verbalize thoughts and feelings  Description: Interventions:  - Assess and re-assess patient's level of risk   - Engage patient in 1:1 interactions, daily, for a minimum of 15 minutes   - Encourage patient to express feelings, fears, frustrations, hopes   Outcome: Progressing  Goal: Refrain from harming self  Description: Interventions:  - Monitor patient closely, per order   - Supervise medication ingestion, monitor effects and side effects   Outcome: Progressing  Goal: Refrain from isolation  Description: Interventions:  - Develop a trusting relationship   - Encourage socialization   Outcome: Progressing  Goal: Refrain from self-neglect  Outcome: Progressing  Goal: Attend and participate in unit activities, including therapeutic, recreational, and educational groups  Description: Interventions:  - Provide therapeutic and educational activities daily, encourage attendance and participation, and document same in the medical record   Outcome: Progressing  Goal: Complete daily ADLs, including personal hygiene independently, as able  Description:  Interventions:  - Observe, teach, and assist patient with ADLS  -  Monitor and promote a balance of rest/activity, with adequate nutrition and elimination   Outcome: Progressing     Problem: Anxiety  Goal: Anxiety is at manageable level  Description: Interventions:  - Assess and monitor patient's anxiety level.   - Monitor for signs and symptoms (heart palpitations, chest pain, shortness of breath, headaches, nausea, feeling jumpy, restlessness, irritable, apprehensive).   - Collaborate with interdisciplinary team and initiate plan and interventions as ordered.  - [place] patient to unit/surroundings  - Explain treatment plan  - Encourage participation in care  - Encourage verbalization of concerns/fears  - Identify coping mechanisms  - Assist in developing anxiety-reducing skills  - Administer/offer alternative therapies  - Limit or eliminate stimulants  Outcome: Progressing     Problem: Knowledge Deficit  Goal: Patient/family/caregiver demonstrates understanding of disease process, treatment plan, medications, and discharge instructions  Description: Complete learning assessment and assess knowledge base.  Interventions:  - Provide teaching at level of understanding  - Provide teaching via preferred learning methods  Outcome: Progressing

## 2024-06-26 NOTE — PROGRESS NOTES
"Progress Note - Behavioral Health   Franco Roberson 39 y.o. male MRN: [id number]  Unit/Bed#: [facility] 352-02 Encounter: [id number]      Assessment & Plan   Principal Problem:    MDD (major depressive disorder), recurrent severe, without psychosis (HCC)  Active Problems:    Unspecified depressive disorder (HCC)    Medical clearance for psychiatric admission    Hyperlipidemia    Vitamin D deficiency    B12 deficiency    Hypothyroidism    Rule Out Autism spectrum disorder      Subjective:  Patient was seen today for continuation of care, records reviewed and patient was discussed with the morning case review team.  Per staff, Franco continues to be visible in the milieu.  He is meal and medication compliant,     Franco was seen today for psychiatric follow-up.  On assessment today, Franco was seen in the hallway away from peers.  Franco continues to report his mood is \"good\".  He endorses some mild ongoing anxiety when he thinks about the future at his group home.  He feels he can cope with his anxiety much better now.  He feels that he is ruminating less.  He has been sleeping well and appetite is good.     Franco denies acute suicidal/self-harm ideation/intent/plan upon direct inquiry at this time.  Franco remains behaviorally appropriate, no agitation or aggression noted on exam or in report.  No overt delusions or paranoia are verbalized.   Franco remains adherent to his current psychotropic medication regimen.    Recommended Treatment: Treatment plan and medication changes discussed and per the attending physician the plan is:    1.Continue with group therapy, milieu therapy and occupational therapy  2.Behavioral Health checks every 7 minutes  3.Continue frequent safety checks and vitals per unit protocol  4.Continue with SLIM medical management as indicated  5.Continue with current medication regimen: Zoloft 125mg PO daily and Abilify 5mg PO daily  6.Will review labs in the a.m.  7.Disposition Planning: Discharge planning " "and efforts remain ongoing    Vitals:  Vitals:    06/26/24 0715   BP: 124/85   Pulse: 62   Resp:    Temp: (!) 97.2 °F (36.2 °C)   SpO2: 98%       Laboratory Results:  I have personally reviewed all pertinent laboratory/tests results.  Most Recent Labs:   Lab Results   Component Value Date    WBC 5.42 05/15/2024    RBC 5.05 05/15/2024    HGB 16.1 05/15/2024    HCT 49.4 (H) 05/15/2024     05/15/2024    RDW 12.6 05/15/2024    NEUTROABS 2.83 05/15/2024    SODIUM 142 05/15/2024    K 4.1 05/15/2024     05/15/2024    CO2 28 05/15/2024    BUN 13 05/15/2024    CREATININE 0.97 05/15/2024    GLUC 89 05/15/2024    GLUF 89 05/15/2024    CALCIUM 9.3 05/15/2024    AST 21 05/15/2024    ALT 19 05/15/2024    ALKPHOS 66 05/15/2024    TP 7.6 05/15/2024    ALB 4.3 05/15/2024    TBILI 0.69 05/15/2024    CHOLESTEROL 191 05/15/2024    HDL 44 05/15/2024    TRIG 116 05/15/2024    LDLCALC 124 (H) 05/15/2024    NONHDLC 147 05/15/2024    YFU4XLOCDSMA 5.162 (H) 05/15/2024    FREET4 0.60 (L) 05/15/2024       Psychiatric Review of Systems:  Behavior over the last 24 hours:  continues to slowly improve  Sleep: adequate  Appetite: adequate  Medication side effects: denies  ROS: no complaints, denies shortness of breath or chest pain and all other systems are negative for acute changes    Mental Status Evaluation:    Appearance:  casually dressed, marginal hygiene, looks stated age   Behavior:  cooperative, calm   Speech:  normal rate and volume, fluent, clear   Mood:  \"Good\"     Affect:  constricted   Thought Process:  goal directed, linear   Associations: intact associations   Thought Content:  no overt delusions   Perceptual Disturbances: denies auditory or visual hallucinations when asked, does not appear responding to internal stimuli   Risk Potential: Suicidal ideation - None at present, contracts for safety on the unit, would talk to staff if not feeling safe on the unit  Homicidal ideation - None  Potential for aggression - No "   Sensorium:  oriented to person, place, and situation   Memory:  recent memory intact   Consciousness:  alert and awake   Attention/Concentration: attention span and concentration are age appropriate   Insight:  limited   Judgment: limited   Gait/Station: normal gait/station   Motor Activity: no abnormal movements     Progress Toward Goals:   Franco is progressing towards goals of inpatient psychiatric treatment by continued medication compliance and is attending therapeutic modalities on the milieu. However, the patient continues to require inpatient psychiatric hospitalization for continued medication management and titration to optimize symptom reduction, improve sleep hygiene, and demonstrate adequate self-care.    Risk of Harm to Self:   Nursing Suicide Risk Assessment Last 24 hours: C-SSRS Risk (Since Last Contact)  Calculated C-SSRS Risk Score (Since Last Contact): No Risk Indicated  Current Specific Risk Factors include: diagnosis of mood disorder  Protective Factors: no current suicidal ideation, ability to communicate with staff on the unit, able to contract for safety on the unit, improved depressive symptoms, improved anxiety symptoms, responds to redirection  Based on today's assessment, Franco presents the following risk of harm to self: low    Risk of Harm to Others:  Nursing Homicide Risk Assessment: Violence Risk to Others: Denies within past 6 months  Current Specific Risk Factors include: multiple stressors, social difficulties  Protective Factors: no current homicidal ideation, compliant with medications on the unit as ordered, compliant with unit milieu, follows staff redirection  Based on today's assessment, Franco presents the following risk of harm to others: low    The following interventions are recommended: behavioral checks every 7 minutes, continued hospitalization on locked unit        Behavioral Health Medications: all current active meds have been reviewed and continue current  psychiatric medications.  Current Facility-Administered Medications   Medication Dose Route Frequency Provider Last Rate    acetaminophen  650 mg Oral Q6H PRN Basilio Brown MD      acetaminophen  650 mg Oral Q4H PRN Basilio Brown MD      acetaminophen  975 mg Oral Q6H PRN Basilio Brown MD      aluminum-magnesium hydroxide-simethicone  30 mL Oral Q4H PRN Basilio Brown MD      ARIPiprazole  5 mg Oral Daily Basilio Panda MD      Artificial Tears  1 drop Both Eyes Q3H PRN Basilio Brown MD      atorvastatin  10 mg Oral Daily With Dinner WALLY Baptiste      benztropine  1 mg Intramuscular Q4H PRN Max 6/day Basilio Brown MD      benztropine  1 mg Oral Q4H PRN Max 6/day Basilio Brown MD      cyanocobalamin  1,000 mcg Oral Daily WALLY Baptiste      cyanocobalamin  1,000 mcg Intramuscular Once WALLY Baptiste      Diclofenac Sodium  2 g Topical 4x Daily PRN WALLY Baptiste      hydrOXYzine HCL  50 mg Oral Q6H PRN Max 4/day Basilio Brown MD      Or    diphenhydrAMINE  50 mg Intramuscular Q6H PRN Basilio Brown MD      ergocalciferol  50,000 Units Oral Weekly WALLY Baptiste      famotidine  20 mg Oral BID WALLY Baptiste      hydrOXYzine HCL  100 mg Oral Q6H PRN Max 4/day Basilio Brown MD      Or    LORazepam  2 mg Intramuscular Q6H PRN Basilio Brown MD      hydrOXYzine HCL  25 mg Oral Q6H PRN Max 4/day Basilio Brown MD      ibuprofen  400 mg Oral TID WALLY Baptiste      levothyroxine  25 mcg Oral Early Morning WALLY Baptiste      methocarbamol  500 mg Oral Q6H PRN WALLY Baptiste      OLANZapine  5 mg Oral Q4H PRN Max 3/day Basilio Brown MD      Or    OLANZapine  2.5 mg Intramuscular Q4H PRN Max 3/day Basilio Brown MD      OLANZapine  5 mg Oral Q3H PRN Max 3/day Basilio Brown MD      Or    OLANZapine  5 mg  Intramuscular Q3H PRN Max 3/day Basilio Brown MD      OLANZapine  2.5 mg Oral Q4H PRN Max 6/day Basilio Brown MD      polyethylene glycol  17 g Oral Daily PRN Basilio Brown MD      propranolol  10 mg Oral Q8H PRN Basilio Brown MD      senna-docusate sodium  1 tablet Oral Daily PRN Basilio Brown MD      sertraline  125 mg Oral Daily WALLY Gomez      traZODone  50 mg Oral HS PRN Basilio Brown MD         Risks / Benefits of Treatment:  Risks, benefits, and possible side effects of medications explained to patient and patient verbalizes understanding and agreement for treatment.    Counseling / Coordination of Care:  Patient's progress reviewed with nursing staff.  Medications, treatment progress and treatment plan reviewed with patient.  Supportive counseling provided to the patient.    Total floor/unit time spent today 25 minutes. Greater than 50% of total time was spent with the patient and / or family counseling and / or coordination of care. A description of the counseling / coordination of care: medication education, treatment plan, supportive therapy.

## 2024-06-27 LAB
ALBUMIN SERPL BCG-MCNC: 4.4 G/DL (ref 3.5–5)
ALP SERPL-CCNC: 68 U/L (ref 34–104)
ALT SERPL W P-5'-P-CCNC: 22 U/L (ref 7–52)
ANION GAP SERPL CALCULATED.3IONS-SCNC: 7 MMOL/L (ref 4–13)
AST SERPL W P-5'-P-CCNC: 20 U/L (ref 13–39)
BASOPHILS # BLD AUTO: 0.02 THOUSANDS/ΜL (ref 0–0.1)
BASOPHILS NFR BLD AUTO: 0 % (ref 0–1)
BILIRUB SERPL-MCNC: 0.32 MG/DL (ref 0.2–1)
BUN SERPL-MCNC: 21 MG/DL (ref 5–25)
CALCIUM SERPL-MCNC: 9.7 MG/DL (ref 8.4–10.2)
CHLORIDE SERPL-SCNC: 101 MMOL/L (ref 96–108)
CO2 SERPL-SCNC: 30 MMOL/L (ref 21–32)
CREAT SERPL-MCNC: 1.02 MG/DL (ref 0.6–1.3)
EOSINOPHIL # BLD AUTO: 0.17 THOUSAND/ΜL (ref 0–0.61)
EOSINOPHIL NFR BLD AUTO: 2 % (ref 0–6)
ERYTHROCYTE [DISTWIDTH] IN BLOOD BY AUTOMATED COUNT: 12 % (ref 11.6–15.1)
GFR SERPL CREATININE-BSD FRML MDRD: 92 ML/MIN/1.73SQ M
GLUCOSE SERPL-MCNC: 85 MG/DL (ref 65–140)
HCT VFR BLD AUTO: 44.4 % (ref 36.5–49.3)
HGB BLD-MCNC: 14.5 G/DL (ref 12–17)
IMM GRANULOCYTES # BLD AUTO: 0.02 THOUSAND/UL (ref 0–0.2)
IMM GRANULOCYTES NFR BLD AUTO: 0 % (ref 0–2)
LYMPHOCYTES # BLD AUTO: 2.14 THOUSANDS/ΜL (ref 0.6–4.47)
LYMPHOCYTES NFR BLD AUTO: 31 % (ref 14–44)
MCH RBC QN AUTO: 31.9 PG (ref 26.8–34.3)
MCHC RBC AUTO-ENTMCNC: 32.7 G/DL (ref 31.4–37.4)
MCV RBC AUTO: 98 FL (ref 82–98)
MONOCYTES # BLD AUTO: 0.65 THOUSAND/ΜL (ref 0.17–1.22)
MONOCYTES NFR BLD AUTO: 9 % (ref 4–12)
NEUTROPHILS # BLD AUTO: 4.01 THOUSANDS/ΜL (ref 1.85–7.62)
NEUTS SEG NFR BLD AUTO: 58 % (ref 43–75)
NRBC BLD AUTO-RTO: 0 /100 WBCS
PLATELET # BLD AUTO: 249 THOUSANDS/UL (ref 149–390)
PMV BLD AUTO: 8.9 FL (ref 8.9–12.7)
POTASSIUM SERPL-SCNC: 4.5 MMOL/L (ref 3.5–5.3)
PROT SERPL-MCNC: 7.9 G/DL (ref 6.4–8.4)
RBC # BLD AUTO: 4.54 MILLION/UL (ref 3.88–5.62)
SODIUM SERPL-SCNC: 138 MMOL/L (ref 135–147)
WBC # BLD AUTO: 7.01 THOUSAND/UL (ref 4.31–10.16)

## 2024-06-27 PROCEDURE — 80053 COMPREHEN METABOLIC PANEL: CPT

## 2024-06-27 PROCEDURE — 85025 COMPLETE CBC W/AUTO DIFF WBC: CPT

## 2024-06-27 PROCEDURE — 99232 SBSQ HOSP IP/OBS MODERATE 35: CPT

## 2024-06-27 RX ORDER — SULFAMETHOXAZOLE AND TRIMETHOPRIM 800; 160 MG/1; MG/1
1 TABLET ORAL EVERY 12 HOURS SCHEDULED
Status: DISCONTINUED | OUTPATIENT
Start: 2024-06-27 | End: 2024-07-04

## 2024-06-27 RX ADMIN — FAMOTIDINE 20 MG: 20 TABLET ORAL at 17:19

## 2024-06-27 RX ADMIN — ATORVASTATIN CALCIUM 10 MG: 10 TABLET, FILM COATED ORAL at 17:19

## 2024-06-27 RX ADMIN — LEVOTHYROXINE SODIUM 25 MCG: 25 TABLET ORAL at 05:51

## 2024-06-27 RX ADMIN — ARIPIPRAZOLE 5 MG: 5 TABLET ORAL at 08:15

## 2024-06-27 RX ADMIN — SULFAMETHOXAZOLE AND TRIMETHOPRIM 1 TABLET: 800; 160 TABLET ORAL at 21:05

## 2024-06-27 RX ADMIN — SULFAMETHOXAZOLE AND TRIMETHOPRIM 1 TABLET: 800; 160 TABLET ORAL at 08:18

## 2024-06-27 RX ADMIN — SULFAMETHOXAZOLE AND TRIMETHOPRIM 1 TABLET: 800; 160 TABLET ORAL at 02:15

## 2024-06-27 RX ADMIN — FAMOTIDINE 20 MG: 20 TABLET ORAL at 08:15

## 2024-06-27 RX ADMIN — SERTRALINE HYDROCHLORIDE 125 MG: 100 TABLET ORAL at 08:15

## 2024-06-27 RX ADMIN — CYANOCOBALAMIN TAB 1000 MCG 1000 MCG: 1000 TAB at 08:15

## 2024-06-27 RX ADMIN — IBUPROFEN 400 MG: 400 TABLET, FILM COATED ORAL at 08:15

## 2024-06-27 NOTE — NURSING NOTE
Pt informed of urinary tract infection and need for additional labs. Pt stated he is thankful symptoms are not a side effect of his medications.

## 2024-06-27 NOTE — NURSING NOTE
Pt is calm and cooperative upon approach. Pt is medication and meal complaint/interactive with select peers and staff. Denies SI, HI, AH, and VH. Denies any needs at this time.

## 2024-06-27 NOTE — NURSING NOTE
Pt visible on unit walking in halls and approached this writer with concerns about urinary symptoms. ESC message sent to Dr Schilling and UA ordered. Pt agreeable to give sample as he can. Pt denies SI/HI/AVH. Pt denies any other needs or concerns at this time. Calm and cooperative with unit routines and care. Safety checks ongoing.

## 2024-06-27 NOTE — PLAN OF CARE
Problem: Ineffective Coping  Goal: Identifies ineffective coping skills  Outcome: Progressing  Goal: Identifies healthy coping skills  Outcome: Progressing  Goal: Demonstrates healthy coping skills  Outcome: Progressing  Goal: Participates in unit activities  Description: Interventions:  - Provide therapeutic environment   - Provide required programming   - Redirect inappropriate behaviors   Outcome: Progressing     Problem: Risk for Self Injury/Neglect  Goal: Treatment Goal: Remain safe during length of stay, learn and adopt new coping skills, and be free of self-injurious ideation, impulses and acts at the time of discharge  Outcome: Progressing     Problem: Depression  Goal: Treatment Goal: Demonstrate behavioral control of depressive symptoms, verbalize feelings of improved mood/affect, and adopt new coping skills prior to discharge  Outcome: Progressing  Goal: Verbalize thoughts and feelings  Description: Interventions:  - Assess and re-assess patient's level of risk   - Engage patient in 1:1 interactions, daily, for a minimum of 15 minutes   - Encourage patient to express feelings, fears, frustrations, hopes   Outcome: Progressing  Goal: Refrain from harming self  Description: Interventions:  - Monitor patient closely, per order   - Supervise medication ingestion, monitor effects and side effects   Outcome: Progressing  Goal: Refrain from isolation  Description: Interventions:  - Develop a trusting relationship   - Encourage socialization   Outcome: Progressing  Goal: Refrain from self-neglect  Outcome: Progressing  Goal: Attend and participate in unit activities, including therapeutic, recreational, and educational groups  Description: Interventions:  - Provide therapeutic and educational activities daily, encourage attendance and participation, and document same in the medical record   Outcome: Progressing  Goal: Complete daily ADLs, including personal hygiene independently, as able  Description:  Interventions:  - Observe, teach, and assist patient with ADLS  -  Monitor and promote a balance of rest/activity, with adequate nutrition and elimination   Outcome: Progressing     Problem: Anxiety  Goal: Anxiety is at manageable level  Description: Interventions:  - Assess and monitor patient's anxiety level.   - Monitor for signs and symptoms (heart palpitations, chest pain, shortness of breath, headaches, nausea, feeling jumpy, restlessness, irritable, apprehensive).   - Collaborate with interdisciplinary team and initiate plan and interventions as ordered.  - [place] patient to unit/surroundings  - Explain treatment plan  - Encourage participation in care  - Encourage verbalization of concerns/fears  - Identify coping mechanisms  - Assist in developing anxiety-reducing skills  - Administer/offer alternative therapies  - Limit or eliminate stimulants  Outcome: Progressing     Problem: DISCHARGE PLANNING - CARE MANAGEMENT  Goal: Discharge to post-acute care or home with appropriate resources  Description: INTERVENTIONS:  - Conduct assessment to determine patient/family and health care team treatment goals, and need for post-acute services based on payer coverage, community resources, and patient preferences, and barriers to discharge  - Address psychosocial, clinical, and financial barriers to discharge as identified in assessment in conjunction with the patient/family and health care team  - Arrange appropriate level of post-acute services according to patient’s   needs and preference and payer coverage in collaboration with the physician and health care team  - Communicate with and update the patient/family, physician, and health care team regarding progress on the discharge plan  - Arrange appropriate transportation to post-acute venues  Outcome: Progressing     Problem: Knowledge Deficit  Goal: Patient/family/caregiver demonstrates understanding of disease process, treatment plan, medications, and discharge  instructions  Description: Complete learning assessment and assess knowledge base.  Interventions:  - Provide teaching at level of understanding  - Provide teaching via preferred learning methods  Outcome: Progressing

## 2024-06-27 NOTE — PROGRESS NOTES
"Progress Note - Behavioral Health   Franco Roberson 39 y.o. male MRN: [id number]  Unit/Bed#: U 352-02 Encounter: [id number]      Assessment & Plan   Principal Problem:    MDD (major depressive disorder), recurrent severe, without psychosis (HCC)  Active Problems:    Unspecified depressive disorder (HCC)    Medical clearance for psychiatric admission    Hyperlipidemia    Vitamin D deficiency    B12 deficiency    Hypothyroidism    Rule Out Autism spectrum disorder      Subjective:  Patient was seen today for continuation of care, records reviewed and patient was discussed with the morning case review team.  Per staff, Franco has urinary complaints last evening.  Urinalysis showing UTI and started on Bactrim.  CBC and CMP this morning were normal.  He continues to be intermittently visible on the unit.      Franco was seen today for psychiatric follow-up.  On assessment today, Franco was seen in the hallway away from peers.   Franco reports that his mood continue to be \"good\" but has some slight discomfort from UTI.  He presents as overall euthymic today but blunted.  Patient reports last shower was early this week.  Franco was encouraged to maintain good hygiene including showering, changing and keeping clothes clean.  Franco appeared somewhat receptive to education.  He continues to report that he is sleeping well at night and has a good appetite.     Franco denies acute suicidal/self-harm ideation/intent/plan upon direct inquiry at this time.  Franco remains behaviorally appropriate, no agitation or aggression noted on exam or in report.  No overt delusions or paranoia are verbalized.   Franco remains adherent to his current psychotropic medication regimen.    Recommended Treatment: Treatment plan and medication changes discussed and per the attending physician the plan is:    1.Continue with group therapy, milieu therapy and occupational therapy  2.Behavioral Health checks every 7 minutes  3.Continue frequent safety " "checks and vitals per unit protocol  4.Continue with SLIM medical management as indicated  5.Continue with current medication regimen: Zoloft 125mg PO daily and Abilify 5mg PO daily  6.Will review labs in the a.m.  7.Disposition Planning: Discharge planning and efforts remain ongoing    Vitals:  Vitals:    06/27/24 0728   BP: 110/62   Pulse: 74   Resp: 16   Temp: (!) 97.2 °F (36.2 °C)   SpO2: 98%       Laboratory Results:  I have personally reviewed all pertinent laboratory/tests results.  Most Recent Labs:   Lab Results   Component Value Date    WBC 7.01 06/27/2024    RBC 4.54 06/27/2024    HGB 14.5 06/27/2024    HCT 44.4 06/27/2024     06/27/2024    RDW 12.0 06/27/2024    NEUTROABS 4.01 06/27/2024    SODIUM 138 06/27/2024    K 4.5 06/27/2024     06/27/2024    CO2 30 06/27/2024    BUN 21 06/27/2024    CREATININE 1.02 06/27/2024    GLUC 85 06/27/2024    GLUF 89 05/15/2024    CALCIUM 9.7 06/27/2024    AST 20 06/27/2024    ALT 22 06/27/2024    ALKPHOS 68 06/27/2024    TP 7.9 06/27/2024    ALB 4.4 06/27/2024    TBILI 0.32 06/27/2024    CHOLESTEROL 191 05/15/2024    HDL 44 05/15/2024    TRIG 116 05/15/2024    LDLCALC 124 (H) 05/15/2024    NONHDLC 147 05/15/2024    [id number] 5.162 (H) 05/15/2024    FREET4 0.60 (L) 05/15/2024       Psychiatric Review of Systems:  Behavior over the last 24 hours:  unchanged  Sleep: adequate  Appetite: adequate  Medication side effects: denies  ROS: no complaints, denies shortness of breath or chest pain and all other systems are negative for acute changes    Mental Status Evaluation:    Appearance:  casually dressed, marginal hygiene, looks stated age   Behavior:  cooperative, calm   Speech:  fluent, clear, scant   Mood:  \"Good\"     Affect:  blunted   Thought Process:  goal directed, linear   Associations: intact associations   Thought Content:  no overt delusions   Perceptual Disturbances: denies auditory or visual hallucinations when asked, does not appear responding to " internal stimuli   Risk Potential: Suicidal ideation - None at present, contracts for safety on the unit, would talk to staff if not feeling safe on the unit  Homicidal ideation - None  Potential for aggression - No   Sensorium:  oriented to person, place, and situation   Memory:  recent memory intact   Consciousness:  alert and awake   Attention/Concentration: attention span and concentration are age appropriate   Insight:  limited   Judgment: limited   Gait/Station: normal gait/station   Motor Activity: no abnormal movements     Progress Toward Goals:   Franco is progressing towards goals of inpatient psychiatric treatment by continued medication compliance and is attending therapeutic modalities on the milieu. However, the patient continues to require inpatient psychiatric hospitalization for continued medication management and titration to optimize symptom reduction, improve sleep hygiene, and demonstrate adequate self-care.    Risk of Harm to Self:   Nursing Suicide Risk Assessment Last 24 hours: C-SSRS Risk (Since Last Contact)  Calculated C-SSRS Risk Score (Since Last Contact): No Risk Indicated  Current Specific Risk Factors include: diagnosis of mood disorder  Protective Factors: no current suicidal ideation, ability to communicate with staff on the unit, able to contract for safety on the unit, improved depressive symptoms, improved anxiety symptoms, responds to redirection  Based on today's assessment, Franco presents the following risk of harm to self: low    Risk of Harm to Others:  Nursing Homicide Risk Assessment: Violence Risk to Others: Denies within past 6 months  Current Specific Risk Factors include: multiple stressors, social difficulties  Protective Factors: no current homicidal ideation, compliant with medications on the unit as ordered, compliant with unit milieu, follows staff redirection  Based on today's assessment, Franco presents the following risk of harm to others: low    The following  interventions are recommended: behavioral checks every 7 minutes, continued hospitalization on locked unit        Behavioral Health Medications: all current active meds have been reviewed and continue current psychiatric medications.  Current Facility-Administered Medications   Medication Dose Route Frequency Provider Last Rate    acetaminophen  650 mg Oral Q6H PRN Basilio Brown MD      acetaminophen  650 mg Oral Q4H PRN Basilio Brown MD      acetaminophen  975 mg Oral Q6H PRN Basilio Brown MD      aluminum-magnesium hydroxide-simethicone  30 mL Oral Q4H PRN Basilio Brown MD      ARIPiprazole  5 mg Oral Daily Basilio Panda MD      Artificial Tears  1 drop Both Eyes Q3H PRN Basilio Brown MD      atorvastatin  10 mg Oral Daily With Dinner WALLY Baptiste      benztropine  1 mg Intramuscular Q4H PRN Max 6/day Basilio Brown MD      benztropine  1 mg Oral Q4H PRN Max 6/day Basilio Brown MD      cyanocobalamin  1,000 mcg Oral Daily WALLY Baptiste      cyanocobalamin  1,000 mcg Intramuscular Once WALLY Baptiste      Diclofenac Sodium  2 g Topical 4x Daily PRN WALLY Baptiste      hydrOXYzine HCL  50 mg Oral Q6H PRN Max 4/day Basliio Brown MD      Or    diphenhydrAMINE  50 mg Intramuscular Q6H PRN Basilio Brown MD      ergocalciferol  50,000 Units Oral Weekly WALLY Baptiste      famotidine  20 mg Oral BID WALLY Baptiste      hydrOXYzine HCL  100 mg Oral Q6H PRN Max 4/day Basilio Brown MD      Or    LORazepam  2 mg Intramuscular Q6H PRN Basilio Brown MD      hydrOXYzine HCL  25 mg Oral Q6H PRN Max 4/day Basilio Brown MD      levothyroxine  25 mcg Oral Early Morning WALLY Baptiste      methocarbamol  500 mg Oral Q6H PRN WALLY Baptiste      OLANZapine  5 mg Oral Q4H PRN Max 3/day Basilio Brown MD      Or    OLANZapine  2.5 mg  Intramuscular Q4H PRN Max 3/day Basilio Brown MD      OLANZapine  5 mg Oral Q3H PRN Max 3/day Basilio Brown MD      Or    OLANZapine  5 mg Intramuscular Q3H PRN Max 3/day Basilio Brown MD      OLANZapine  2.5 mg Oral Q4H PRN Max 6/day Basilio Brown MD      polyethylene glycol  17 g Oral Daily PRN Basilio Brown MD      propranolol  10 mg Oral Q8H PRN Basilio Brown MD      senna-docusate sodium  1 tablet Oral Daily PRN Basilio Brown MD      sertraline  125 mg Oral Daily WALLY Gomez      sulfamethoxazole-trimethoprim  1 tablet Oral Q12H [facility] Risa Dickson PA-C      traZODone  50 mg Oral HS PRN Basilio Brown MD         Risks / Benefits of Treatment:  Risks, benefits, and possible side effects of medications explained to patient and patient verbalizes understanding and agreement for treatment.    Counseling / Coordination of Care:  Patient's progress reviewed with nursing staff.  Medications, treatment progress and treatment plan reviewed with patient.  Supportive counseling provided to the patient.    Total floor/unit time spent today 25 minutes. Greater than 50% of total time was spent with the patient and / or family counseling and / or coordination of care. A description of the counseling / coordination of care: medication education, treatment plan, supportive therapy.

## 2024-06-27 NOTE — QUICK NOTE
Patient with complaints of dysuria, denies chills, fever, flank pain, pelvic pain, penile pain, hematuria.  UA significant for moderate amounts of bacteria and occasional mucus threads  Will order basic labs and start Bactrim 800 mg twice daily x 7 days  Pending BMP/CMP may need transfer to medical floor for IV antibiotics

## 2024-06-27 NOTE — PROGRESS NOTES
06/27/24 0924   Team Meeting   Meeting Type Daily Rounds   Team Members Present   Team Members Present Physician;Nurse;   Physician Team Member Lucero   [facility] Suhas   Patient/Family Present   Patient Present No   Patient's Family Present No     Pt med/meal compliant. Positive for UTI, started on antibiotics. Discharge pending placement.

## 2024-06-28 PROCEDURE — 99232 SBSQ HOSP IP/OBS MODERATE 35: CPT

## 2024-06-28 RX ADMIN — SULFAMETHOXAZOLE AND TRIMETHOPRIM 1 TABLET: 800; 160 TABLET ORAL at 09:32

## 2024-06-28 RX ADMIN — CYANOCOBALAMIN TAB 1000 MCG 1000 MCG: 1000 TAB at 09:32

## 2024-06-28 RX ADMIN — LEVOTHYROXINE SODIUM 25 MCG: 25 TABLET ORAL at 05:42

## 2024-06-28 RX ADMIN — SERTRALINE HYDROCHLORIDE 125 MG: 100 TABLET ORAL at 09:32

## 2024-06-28 RX ADMIN — FAMOTIDINE 20 MG: 20 TABLET ORAL at 09:32

## 2024-06-28 RX ADMIN — FAMOTIDINE 20 MG: 20 TABLET ORAL at 17:57

## 2024-06-28 RX ADMIN — ARIPIPRAZOLE 5 MG: 5 TABLET ORAL at 09:32

## 2024-06-28 RX ADMIN — ATORVASTATIN CALCIUM 10 MG: 10 TABLET, FILM COATED ORAL at 17:57

## 2024-06-28 RX ADMIN — SULFAMETHOXAZOLE AND TRIMETHOPRIM 1 TABLET: 800; 160 TABLET ORAL at 21:36

## 2024-06-28 NOTE — PROGRESS NOTES
06/28/24 0917   Team Meeting   Meeting Type Daily Rounds   Team Members Present   Team Members Present Physician;Nurse;   Physician Team Member Lucero   Nursing Team Member Tamia   Care Management Team Member Suhas   Patient/Family Present   Patient Present No   Patient's Family Present No     Pt med/meal compliant, visible on the unit, social with select peers. Discharge to be determined.

## 2024-06-28 NOTE — NURSING NOTE
He has been visible on the unit and makes his needs known. He denies SI/HI and A/V hallucinations. He is med/meal compliant. Patient is pleasant and calm.  Patients affect seems to be improving

## 2024-06-28 NOTE — PROGRESS NOTES
Progress Note - Behavioral Health     Franco Roberson 39 y.o. male MRN: [id number]   Unit/Bed#: U 352-02 Encounter: [id number]    Behavior over the last 24 hours: unchanged.     Franco is seen today for psychiatric follow up. Per nursing notes, patient is calm, cooperative, med and meal compliant, denies SI/HI/AVH, keeps to self.  Patient remains in behavioral control. No psych prns in last 24 hours.     Today Franco is calm, cooperative, and pleasant sitting on a chair. He reports he is doing good, and that he was enjoying the group before meeting with this writer. He reports that he has a UTI and is taking Bactrim. Appears withdrawn/isolative to self. Appears blunted. Reports sleeping and eating well without issues. Reports his depression and anxiety are controlled. He attends groups frequently and reports working on his coping skills. He denies active and passive suicidal and homicidal ideations. Patient denies auditory and visual hallucinations, and does not appear to be responding internal stimuli. No overt paranoia, delusional content, or patrick noted.    Denies medication side effects. No medication changes today. Discharge disposition ongoing.    Sleep: normal  Appetite: normal  Medication side effects: No   ROS: all other systems are negative    Mental Status Evaluation:    Appearance:  casually dressed, marginal hygiene, looks stated age   Behavior:  pleasant, cooperative, calm   Speech:  normal volume, clear   Mood:  euthymic   Affect:  blunted, brightens on approach   Thought Process:  goal directed, linear   Associations: intact associations   Thought Content:  no overt delusions   Perceptual Disturbances: denies auditory hallucinations when asked, does not appear responding to internal stimuli, denies visual hallucinations when asked   Risk Potential: Suicidal ideation - None at present, contracts for safety on the unit, would talk to staff if not feeling safe on the unit  Homicidal ideation -  None  Potential for aggression - No   Sensorium:  oriented to person, place, and time/date   Memory:  recent and remote memory grossly intact   Consciousness:  alert and awake   Attention/Concentration: attention span and concentration are age appropriate   Insight:  limited   Judgment: limited   Gait/Station: normal gait/station   Motor Activity: no abnormal movements     Vital signs in last 24 hours:    Temp:  [96.8 °F (36 °C)-97.2 °F (36.2 °C)] 96.8 °F (36 °C)  HR:  [60-80] 60  Resp:  [16] 16  BP: (126-129)/(78-87) 126/87    Laboratory results: I have personally reviewed all pertinent laboratory/tests results    Labs in last 72 hours:   Recent Labs     06/27/24  0140   WBC 7.01   RBC 4.54   HGB 14.5   HCT 44.4      RDW 12.0   NEUTROABS 4.01   SODIUM 138   K 4.5      CO2 30   BUN 21   CREATININE 1.02   GLUC 85   CALCIUM 9.7   AST 20   ALT 22   ALKPHOS 68   TP 7.9   ALB 4.4   TBILI 0.32       Progress Toward Goals: progressing, denies SI/HI/AVH, calm and cooperative, med and meal compliant, appears withdrawn/blunted, attends groups frequently, discharge to be determined.    Assessment & Plan   Principal Problem:    MDD (major depressive disorder), recurrent severe, without psychosis (HCC)  Active Problems:    Unspecified depressive disorder (HCC)    Medical clearance for psychiatric admission    Hyperlipidemia    Vitamin D deficiency    B12 deficiency    Hypothyroidism    Rule Out Autism spectrum disorder      Recommended Treatment:     Planned medication and treatment changes:    All current active medications have been reviewed  Encourage group therapy, milieu therapy and occupational therapy  Behavioral Health checks every 7 minutes  Continue current medications:  Discharge disposition ongoing.    Current Facility-Administered Medications   Medication Dose Route Frequency Provider Last Rate    acetaminophen  650 mg Oral Q6H PRN Basilio Brown MD      acetaminophen  650 mg Oral Q4H PRANIL Richmond  Jose Boss MD      acetaminophen  975 mg Oral Q6H PRN Basilio Brown MD      aluminum-magnesium hydroxide-simethicone  30 mL Oral Q4H PRN Basilio Brown MD      ARIPiprazole  5 mg Oral Daily Basilio Panda MD      Artificial Tears  1 drop Both Eyes Q3H PRN Basilio Brown MD      atorvastatin  10 mg Oral Daily With Dinner WALLY Baptiste      benztropine  1 mg Intramuscular Q4H PRN Max 6/day Basilio Brown MD      benztropine  1 mg Oral Q4H PRN Max 6/day Basilio Brown MD      cyanocobalamin  1,000 mcg Oral Daily LauraWALLY Tony      cyanocobalamin  1,000 mcg Intramuscular Once WALLY Baptiste      Diclofenac Sodium  2 g Topical 4x Daily PRN WALLY Baptiste      hydrOXYzine HCL  50 mg Oral Q6H PRN Max 4/day Basilio Brown MD      Or    diphenhydrAMINE  50 mg Intramuscular Q6H PRN Basilio Brown MD      ergocalciferol  50,000 Units Oral Weekly WALLY Baptiste      famotidine  20 mg Oral BID WALLY Baptiste      hydrOXYzine HCL  100 mg Oral Q6H PRN Max 4/day Basilio Brown MD      Or    LORazepam  2 mg Intramuscular Q6H PRN Basilio Brown MD      hydrOXYzine HCL  25 mg Oral Q6H PRN Max 4/day Basilio Brown MD      levothyroxine  25 mcg Oral Early Morning WALLY Baptiste      methocarbamol  500 mg Oral Q6H PRN WALLY Baptiste      OLANZapine  5 mg Oral Q4H PRN Max 3/day Basilio Brown MD      Or    OLANZapine  2.5 mg Intramuscular Q4H PRN Max 3/day Basilio Brown MD      OLANZapine  5 mg Oral Q3H PRN Max 3/day Basilio Brown MD      Or    OLANZapine  5 mg Intramuscular Q3H PRN Max 3/day Basilio Brown MD      OLANZapine  2.5 mg Oral Q4H PRN Max 6/day Basilio Brown MD      polyethylene glycol  17 g Oral Daily PRN Basilio Brown MD      propranolol  10 mg Oral Q8H PRN Basilio Brown MD      senna-docusate sodium  1 tablet Oral  Daily PRN Basilio Brown MD      sertraline  125 mg Oral Daily WALLY Gomez      sulfamethoxazole-trimethoprim  1 tablet Oral Q12H [facility] Risa Dickson PA-C      traZODone  50 mg Oral HS PRN Basilio Brown MD       Risks / Benefits of Treatment:    Risks, benefits, and possible side effects of medications explained to patient and patient verbalizes understanding and agreement for treatment.    Counseling / Coordination of Care:    Patient's progress discussed with staff in treatment team meeting.  Medications, treatment progress and treatment plan reviewed with patient.  Medication education provided to patient.  Educated on importance of medication and treatment compliance.  Reassurance and supportive therapy provided.    Zander Hurst PA-C 06/28/24

## 2024-06-28 NOTE — PLAN OF CARE
Problem: Ineffective Coping  Goal: Identifies ineffective coping skills  Outcome: Progressing  Goal: Identifies healthy coping skills  Outcome: Progressing  Goal: Demonstrates healthy coping skills  Outcome: Progressing  Goal: Participates in unit activities  Description: Interventions:  - Provide therapeutic environment   - Provide required programming   - Redirect inappropriate behaviors   Outcome: Progressing     Problem: Risk for Self Injury/Neglect  Goal: Treatment Goal: Remain safe during length of stay, learn and adopt new coping skills, and be free of self-injurious ideation, impulses and acts at the time of discharge  Outcome: Progressing     Problem: Depression  Goal: Treatment Goal: Demonstrate behavioral control of depressive symptoms, verbalize feelings of improved mood/affect, and adopt new coping skills prior to discharge  Outcome: Progressing  Goal: Verbalize thoughts and feelings  Description: Interventions:  - Assess and re-assess patient's level of risk   - Engage patient in 1:1 interactions, daily, for a minimum of 15 minutes   - Encourage patient to express feelings, fears, frustrations, hopes   Outcome: Progressing  Goal: Refrain from harming self  Description: Interventions:  - Monitor patient closely, per order   - Supervise medication ingestion, monitor effects and side effects   Outcome: Progressing  Goal: Refrain from isolation  Description: Interventions:  - Develop a trusting relationship   - Encourage socialization   Outcome: Progressing  Goal: Refrain from self-neglect  Outcome: Progressing  Goal: Attend and participate in unit activities, including therapeutic, recreational, and educational groups  Description: Interventions:  - Provide therapeutic and educational activities daily, encourage attendance and participation, and document same in the medical record   Outcome: Progressing  Goal: Complete daily ADLs, including personal hygiene independently, as able  Description:  Interventions:  - Observe, teach, and assist patient with ADLS  -  Monitor and promote a balance of rest/activity, with adequate nutrition and elimination   Outcome: Progressing     Problem: Anxiety  Goal: Anxiety is at manageable level  Description: Interventions:  - Assess and monitor patient's anxiety level.   - Monitor for signs and symptoms (heart palpitations, chest pain, shortness of breath, headaches, nausea, feeling jumpy, restlessness, irritable, apprehensive).   - Collaborate with interdisciplinary team and initiate plan and interventions as ordered.  - [place] patient to unit/surroundings  - Explain treatment plan  - Encourage participation in care  - Encourage verbalization of concerns/fears  - Identify coping mechanisms  - Assist in developing anxiety-reducing skills  - Administer/offer alternative therapies  - Limit or eliminate stimulants  Outcome: Progressing     Problem: Knowledge Deficit  Goal: Patient/family/caregiver demonstrates understanding of disease process, treatment plan, medications, and discharge instructions  Description: Complete learning assessment and assess knowledge base.  Interventions:  - Provide teaching at level of understanding  - Provide teaching via preferred learning methods  Outcome: Progressing

## 2024-06-28 NOTE — NURSING NOTE
"Patient is keeping to himself and interacts pleasantly but briefly. He is cooperative with scheduled Bactrim this evening and is hoping for relief from some of the UTI symptoms e.g frequency, urgency. \"Soon I hope.\"  He denies S.I.H.I A/H V/H  "

## 2024-06-29 PROCEDURE — 99232 SBSQ HOSP IP/OBS MODERATE 35: CPT | Performed by: PSYCHIATRY & NEUROLOGY

## 2024-06-29 RX ADMIN — SULFAMETHOXAZOLE AND TRIMETHOPRIM 1 TABLET: 800; 160 TABLET ORAL at 21:03

## 2024-06-29 RX ADMIN — FAMOTIDINE 20 MG: 20 TABLET ORAL at 17:33

## 2024-06-29 RX ADMIN — SERTRALINE HYDROCHLORIDE 125 MG: 100 TABLET ORAL at 10:08

## 2024-06-29 RX ADMIN — ATORVASTATIN CALCIUM 10 MG: 10 TABLET, FILM COATED ORAL at 17:33

## 2024-06-29 RX ADMIN — ARIPIPRAZOLE 5 MG: 5 TABLET ORAL at 10:08

## 2024-06-29 RX ADMIN — SULFAMETHOXAZOLE AND TRIMETHOPRIM 1 TABLET: 800; 160 TABLET ORAL at 10:09

## 2024-06-29 RX ADMIN — FAMOTIDINE 20 MG: 20 TABLET ORAL at 10:08

## 2024-06-29 RX ADMIN — LEVOTHYROXINE SODIUM 25 MCG: 25 TABLET ORAL at 06:40

## 2024-06-29 RX ADMIN — CYANOCOBALAMIN TAB 1000 MCG 1000 MCG: 1000 TAB at 10:08

## 2024-06-29 NOTE — PLAN OF CARE
Problem: Ineffective Coping  Goal: Identifies ineffective coping skills  Outcome: Progressing  Goal: Identifies healthy coping skills  Outcome: Progressing  Goal: Demonstrates healthy coping skills  Outcome: Progressing  Goal: Participates in unit activities  Description: Interventions:  - Provide therapeutic environment   - Provide required programming   - Redirect inappropriate behaviors   Outcome: Progressing     Problem: Risk for Self Injury/Neglect  Goal: Treatment Goal: Remain safe during length of stay, learn and adopt new coping skills, and be free of self-injurious ideation, impulses and acts at the time of discharge  Outcome: Progressing     Problem: Depression  Goal: Treatment Goal: Demonstrate behavioral control of depressive symptoms, verbalize feelings of improved mood/affect, and adopt new coping skills prior to discharge  Outcome: Progressing  Goal: Verbalize thoughts and feelings  Description: Interventions:  - Assess and re-assess patient's level of risk   - Engage patient in 1:1 interactions, daily, for a minimum of 15 minutes   - Encourage patient to express feelings, fears, frustrations, hopes   Outcome: Progressing  Goal: Refrain from harming self  Description: Interventions:  - Monitor patient closely, per order   - Supervise medication ingestion, monitor effects and side effects   Outcome: Progressing  Goal: Refrain from isolation  Description: Interventions:  - Develop a trusting relationship   - Encourage socialization   Outcome: Progressing  Goal: Refrain from self-neglect  Outcome: Progressing  Goal: Attend and participate in unit activities, including therapeutic, recreational, and educational groups  Description: Interventions:  - Provide therapeutic and educational activities daily, encourage attendance and participation, and document same in the medical record   Outcome: Progressing  Goal: Complete daily ADLs, including personal hygiene independently, as able  Description:  Interventions:  - Observe, teach, and assist patient with ADLS  -  Monitor and promote a balance of rest/activity, with adequate nutrition and elimination   Outcome: Progressing     Problem: Anxiety  Goal: Anxiety is at manageable level  Description: Interventions:  - Assess and monitor patient's anxiety level.   - Monitor for signs and symptoms (heart palpitations, chest pain, shortness of breath, headaches, nausea, feeling jumpy, restlessness, irritable, apprehensive).   - Collaborate with interdisciplinary team and initiate plan and interventions as ordered.  - [place] patient to unit/surroundings  - Explain treatment plan  - Encourage participation in care  - Encourage verbalization of concerns/fears  - Identify coping mechanisms  - Assist in developing anxiety-reducing skills  - Administer/offer alternative therapies  - Limit or eliminate stimulants  Outcome: Progressing     Problem: DISCHARGE PLANNING - CARE MANAGEMENT  Goal: Discharge to post-acute care or home with appropriate resources  Description: INTERVENTIONS:  - Conduct assessment to determine patient/family and health care team treatment goals, and need for post-acute services based on payer coverage, community resources, and patient preferences, and barriers to discharge  - Address psychosocial, clinical, and financial barriers to discharge as identified in assessment in conjunction with the patient/family and health care team  - Arrange appropriate level of post-acute services according to patient’s   needs and preference and payer coverage in collaboration with the physician and health care team  - Communicate with and update the patient/family, physician, and health care team regarding progress on the discharge plan  - Arrange appropriate transportation to post-acute venues  Outcome: Progressing

## 2024-06-29 NOTE — PROGRESS NOTES
BEHAVIORAL HEALTH SERVICE PROGRESS NOTE    Franco Roberson 39 y.o. male MRN: [id number]  Unit/Bed#: [facility] 352-02 Encounter: [id number]         ASSESSMENT/PLAN     Franco Roberson is a 39 y.o. male with a history of mood disorder and anxiety who was admitted to the inpatient psychiatric unit on a voluntary 201 commitment basis due to suicidal ideation. Patient did not report any concerns at this time. Denied SI, HI, and AVH. No anticipated psychopharmacologic changes at this juncture.    Principal Problem:    MDD (major depressive disorder), recurrent severe, without psychosis (HCC)  Active Problems:    Unspecified depressive disorder (HCC)    Medical clearance for psychiatric admission    Hyperlipidemia    Vitamin D deficiency    B12 deficiency    Hypothyroidism    Rule Out Autism spectrum disorder      Current Legal status: 201 voluntary commitment  Disposition: TBD    RECOMMENDED TREATMENT     Continue current medications:  Abilify 5 mg daily  Vitamin B12 1000 mcg daily for supplementation  Vitamin D2 12803 U weekly for supplementation  Zoloft 125 mg daily for mood  All current active medications have been reviewed  Encourage group therapy, milieu therapy and occupational therapy  Behavioral Health checks every 15 minutes    Risks / Benefits of Treatment: Risks, benefits, and possible side effects of medications explained to patient and patient verbalizes understanding and agreement for treatment.  Counseling / Coordination of Care: Patient's progress discussed with staff in treatment team meeting.  Medications, treatment progress and treatment plan reviewed with patient.         SUBJECTIVE     Over the last 24 hours:  Per nursing/staff report: No acute concerns or major events reported. Per staff, patient was noted to improving. All documentation and nursing notes reviewed.  PRN medications: none  Behavior over the last 24 hours: slowly improving.     The patient was seen and evaluated today for continuity of care. On  "interview, the patient is sitting in bed.  Patient reports \"good enough\" mood.  Patient endorses \"normal\" energy levels, adding they are \"up a little\". Patient reports \"disturbed\" sleep. Per patient, appetite is \"good\". Patient endorsed sleep issues due to roommate's coughing and his bladder infection. Patient reports 2/10 depression. Patient reports 4/10 anxiety. Currently, patient denies auditory hallucinations and denies visual hallucinations. The patient denies current passive or active suicidal ideation, intent, or plan. Patient denies current passive or active homicidal ideation, intent, or plan. Patient reports tolerating medications well and denies adverse effects at this time. Team discussed treatment plan, to which patient is amenable. Patient does not endorse additional questions or concerns at this time.     Progress Toward Goals: progressing    Review of Systems:  Sleep: frequent awakenings  Appetite: normal  Medication side effects: No   ROS: no complaints, all other systems are negative      OBJECTIVE     Vital signs in last 24 hours:   Temp:  [97.4 °F (36.3 °C)] 97.4 °F (36.3 °C)  HR:  [71-78] 78  Resp:  [16] 16  BP: (115-141)/(74-79) 115/74    Mental Status Exam:  Appearance: alert, appears stated age, and marginal grooming/hygiene  Behavior: cooperative, calm, fair eye contact  Motor: no abnormal movements  Gait/Station: in bed  Speech: normal rate, normal volume, normal pitch  Mood: \"good enough\"  Affect: blunted  Thought process: goal directed, normal rate of thoughts  Associations: intact associations  Thought content: no overt delusions  Perceptual disturbances: no auditory hallucinations, no visual hallucinations, does not appear responding to internal stimuli  Risk potential: No active or passive suicidal or homicidal ideation was verbalized during interview  Potential for aggression - Not at present  Cognition: memory grossly intact and appears to be of average intelligence  Sensorium: " oriented to person, place, and time/date  Consciousness: alert and awake  Attention/Concentration: attention span and concentration are age appropriate  Insight: Limited  Judgment: Limited             ACTIVE MEDICATIONS     Current Medications:  Current Facility-Administered Medications   Medication Dose Route Frequency Provider Last Rate    acetaminophen  650 mg Oral Q6H PRN Basilio Brown MD      acetaminophen  650 mg Oral Q4H PRN Basilio Brown MD      acetaminophen  975 mg Oral Q6H PRN Basilio Brown MD      aluminum-magnesium hydroxide-simethicone  30 mL Oral Q4H PRN Basilio Brown MD      ARIPiprazole  5 mg Oral Daily Basilio Panda MD      Artificial Tears  1 drop Both Eyes Q3H PRN Basilio Brown MD      atorvastatin  10 mg Oral Daily With Dinner WALLY Baptiste      benztropine  1 mg Intramuscular Q4H PRN Max 6/day Basilio Brown MD      benztropine  1 mg Oral Q4H PRN Max 6/day Basilio Brown MD      cyanocobalamin  1,000 mcg Oral Daily WALLY Baptiste      cyanocobalamin  1,000 mcg Intramuscular Once WALLY Baptiste      Diclofenac Sodium  2 g Topical 4x Daily PRN WALLY Baptiste      hydrOXYzine HCL  50 mg Oral Q6H PRN Max 4/day Basilio Brown MD      Or    diphenhydrAMINE  50 mg Intramuscular Q6H PRN Basilio Brown MD      ergocalciferol  50,000 Units Oral Weekly WALLY Baptiste      famotidine  20 mg Oral BID WALLY Baptiste      hydrOXYzine HCL  100 mg Oral Q6H PRN Max 4/day Basilio Brown MD      Or    LORazepam  2 mg Intramuscular Q6H PRN Basilio Brown MD      hydrOXYzine HCL  25 mg Oral Q6H PRN Max 4/day Basilio Brown MD      levothyroxine  25 mcg Oral Early Morning WALLY Baptiste      methocarbamol  500 mg Oral Q6H PRN WALLY Baptiste      OLANZapine  5 mg Oral Q4H PRN Max 3/day Basilio Brown MD      Or    OLANZapine  2.5  mg Intramuscular Q4H PRN Max 3/day Basilio Brown MD      OLANZapine  5 mg Oral Q3H PRN Max 3/day Basilio Brown MD      Or    OLANZapine  5 mg Intramuscular Q3H PRN Max 3/day Basilio Brown MD      OLANZapine  2.5 mg Oral Q4H PRN Max 6/day Basilio Brown MD      polyethylene glycol  17 g Oral Daily PRN Basilio Brown MD      propranolol  10 mg Oral Q8H PRN Basilio Brown MD      senna-docusate sodium  1 tablet Oral Daily PRN Basilio Brown MD      sertraline  125 mg Oral Daily WALLY Gomez      sulfamethoxazole-trimethoprim  1 tablet Oral Q12H [facility] Risa Dickson PA-C      traZODone  50 mg Oral HS PRN Basilio Brown MD         Behavioral Health Medications: I have personally reviewed all current active medications. Changes as in plan section above.      ADDITIONAL DATA     EKG Results:   No results found for this visit on 05/14/24 (from the past 1000 hour(s)).    Radiology Results: Reviewed, no new imaging  No results found.  No Chest XR results available for this patient.     Laboratory Results: I have personally reviewed all pertinent laboratory/tests results  Results from the past 24 hours: No results found for this or any previous visit (from the past 24 hour(s)).      This note was not shared with the patient due to reasonable likelihood of causing patient harm        Mya eCe MD 06/29/24  Department of Psychiatry and Behavioral Health  [facility]

## 2024-06-29 NOTE — NURSING NOTE
Patient has been in the milieu and at time in the dayroom with peers. He is slightly less apprehensive when staff approach - he actually seems to enjoy these interactions.  He denies S.I.H.I A/H V/H

## 2024-06-29 NOTE — NURSING NOTE
Patient periodically visible on the unit, otherwise he has been resting in his room.  Patient reports not sleeping well.  He denies SI/HI and A/V hallucinations.  He is med/meal compliant.  Patient reports his mood is improving.

## 2024-06-30 PROCEDURE — 99232 SBSQ HOSP IP/OBS MODERATE 35: CPT | Performed by: PSYCHIATRY & NEUROLOGY

## 2024-06-30 RX ADMIN — FAMOTIDINE 20 MG: 20 TABLET ORAL at 17:00

## 2024-06-30 RX ADMIN — FAMOTIDINE 20 MG: 20 TABLET ORAL at 09:46

## 2024-06-30 RX ADMIN — ARIPIPRAZOLE 5 MG: 5 TABLET ORAL at 09:46

## 2024-06-30 RX ADMIN — ATORVASTATIN CALCIUM 10 MG: 10 TABLET, FILM COATED ORAL at 16:59

## 2024-06-30 RX ADMIN — CYANOCOBALAMIN TAB 1000 MCG 1000 MCG: 1000 TAB at 09:46

## 2024-06-30 RX ADMIN — SERTRALINE HYDROCHLORIDE 125 MG: 100 TABLET ORAL at 09:46

## 2024-06-30 RX ADMIN — SULFAMETHOXAZOLE AND TRIMETHOPRIM 1 TABLET: 800; 160 TABLET ORAL at 20:37

## 2024-06-30 RX ADMIN — SULFAMETHOXAZOLE AND TRIMETHOPRIM 1 TABLET: 800; 160 TABLET ORAL at 09:46

## 2024-06-30 RX ADMIN — LEVOTHYROXINE SODIUM 25 MCG: 25 TABLET ORAL at 05:39

## 2024-06-30 NOTE — PROGRESS NOTES
BEHAVIORAL HEALTH SERVICE PROGRESS NOTE    Franco Roberson 39 y.o. male MRN: [id number]  Unit/Bed#: [facility] 352-02 Encounter: [id number]         ASSESSMENT/PLAN     Franco Roberson is a 39 y.o. male with a history of mood disorder and anxiety who was admitted to the inpatient psychiatric unit on a voluntary 201 commitment basis due to suicidal ideation.  Patient did not report any concerns at this time. Denied SI, HI, and AVH. No anticipated psychopharmacologic changes at this juncture.    Principal Problem:    MDD (major depressive disorder), recurrent severe, without psychosis (HCC)  Active Problems:    Unspecified depressive disorder (HCC)    Medical clearance for psychiatric admission    Hyperlipidemia    Vitamin D deficiency    B12 deficiency    Hypothyroidism    Rule Out Autism spectrum disorder        Current Legal status: 201 voluntary commitment  Disposition: TBD    RECOMMENDED TREATMENT     Continue current medications:  Abilify 5 mg daily  Vitamin B12 1000 mcg daily for supplementation  Vitamin D2 10760 U weekly for supplementation  Zoloft 125 mg daily for mood  All current active medications have been reviewed  Encourage group therapy, milieu therapy and occupational therapy  Behavioral Health checks every 15 minutes    Risks / Benefits of Treatment: Risks, benefits, and possible side effects of medications explained to patient and patient verbalizes understanding and agreement for treatment.  Counseling / Coordination of Care: Patient's progress discussed with staff in treatment team meeting.  Medications, treatment progress and treatment plan reviewed with patient.         SUBJECTIVE     Over the last 24 hours:  Per nursing/staff report: No acute concerns or major events reported. All documentation and nursing notes reviewed.  PRN medications: None  Behavior over the last 24 hours: unchanged.     The patient was seen and evaluated today for continuity of care. On interview, the patient is sitting up in bed.   "Patient reports \"all right\" mood.  Patient endorses \"all right\" energy levels. Patient reports \"all right\" sleep, later sharing that it was decreased due to \"external factors\". Per patient, appetite is \" all right\". Patient does not endorse any complaints. Patient reports 3/10 depression. Patient reports 4/10 anxiety. Per patient, most recent bowel movement was last night. Currently, patient denies auditory hallucinations and denies visual hallucinations. The patient denies current passive or active suicidal ideation, intent, or plan. Patient denies current passive or active homicidal ideation, intent, or plan. Patient reports tolerating medications well and denies adverse effects at this time. Team discussed treatment plan, to which patient is amenable. Patient does not endorse additional questions or concerns at this time.     Progress Toward Goals: progressing    Review of Systems:  Sleep: decreased  Appetite: normal  Medication side effects: No   ROS: all other systems are negative      OBJECTIVE     Vital signs in last 24 hours:   Temp:  [97 °F (36.1 °C)-97.6 °F (36.4 °C)] 97.6 °F (36.4 °C)  HR:  [64-79] 64  Resp:  [17] 17  BP: (112-129)/(76-78) 129/78    Mental Status Exam:  Appearance: alert, appears stated age, and disheveled  Behavior: cooperative, calm, minimal eye contact  Motor: no abnormal movements  Gait/Station: in bed  Speech: normal volume, articulation error  Mood: \"All right\"  Affect: blunted  Thought process: goal directed  Associations: intact associations  Thought content: no overt delusions  Perceptual disturbances: no auditory hallucinations, no visual hallucinations  Risk potential: No active or passive suicidal or homicidal ideation was verbalized during interview  Potential for aggression - No  Cognition: memory grossly intact and appears to be of average intelligence  Sensorium: oriented to person, place, and time/date  Consciousness: alert and awake  Attention/Concentration: attention " span and concentration are age appropriate  Insight: Limited  Judgment: Limited     Nutrition Assessment and Intervention:     Reviewed food recall journal      Emotional and Mental Well-being, Sleep, Connectedness Assessment and Intervention:    Sleep/stress assessment performed           ACTIVE MEDICATIONS     Current Medications:  Current Facility-Administered Medications   Medication Dose Route Frequency Provider Last Rate    acetaminophen  650 mg Oral Q6H PRN Basilio Brown MD      acetaminophen  650 mg Oral Q4H PRN Basilio Brown MD      acetaminophen  975 mg Oral Q6H PRN Basilio Brown MD      aluminum-magnesium hydroxide-simethicone  30 mL Oral Q4H PRN Basilio Brown MD      ARIPiprazole  5 mg Oral Daily Basilio Panda MD      Artificial Tears  1 drop Both Eyes Q3H PRN Basilio Brown MD      atorvastatin  10 mg Oral Daily With Dinner WALLY Baptiste      benztropine  1 mg Intramuscular Q4H PRN Max 6/day Basilio Brown MD      benztropine  1 mg Oral Q4H PRN Max 6/day Basilio Brown MD      cyanocobalamin  1,000 mcg Oral Daily WALLY Baptiste      cyanocobalamin  1,000 mcg Intramuscular Once WALLY Baptiste      Diclofenac Sodium  2 g Topical 4x Daily PRN WALLY Baptiste      hydrOXYzine HCL  50 mg Oral Q6H PRN Max 4/day Basilio Brown MD      Or    diphenhydrAMINE  50 mg Intramuscular Q6H PRN Basilio Brown MD      ergocalciferol  50,000 Units Oral Weekly WALLY Baptiste      famotidine  20 mg Oral BID WALLY Baptiste      hydrOXYzine HCL  100 mg Oral Q6H PRN Max 4/day Basilio Brown MD      Or    LORazepam  2 mg Intramuscular Q6H PRN Basilio Brown MD      hydrOXYzine HCL  25 mg Oral Q6H PRN Max 4/day Basilio Brown MD      levothyroxine  25 mcg Oral Early Morning WALLY Baptiste      methocarbamol  500 mg Oral Q6H PRN WALLY Baptiste       OLANZapine  5 mg Oral Q4H PRN Max 3/day Basilio Brown MD      Or    OLANZapine  2.5 mg Intramuscular Q4H PRN Max 3/day Basilio Brown MD      OLANZapine  5 mg Oral Q3H PRN Max 3/day Basilio Brown MD      Or    OLANZapine  5 mg Intramuscular Q3H PRN Max 3/day Basilio Brown MD      OLANZapine  2.5 mg Oral Q4H PRN Max 6/day Basilio Brown MD      polyethylene glycol  17 g Oral Daily PRN Basilio Brown MD      propranolol  10 mg Oral Q8H PRN Basilio Brown MD      senna-docusate sodium  1 tablet Oral Daily PRN Basilio Brown MD      sertraline  125 mg Oral Daily WALLY Gomez      sulfamethoxazole-trimethoprim  1 tablet Oral Q12H [facility] Risa Dickson PA-C      traZODone  50 mg Oral HS PRN Basilio Brown MD         Behavioral Health Medications: I have personally reviewed all current active medications. Changes as in plan section above.      ADDITIONAL DATA     EKG Results: reviewed  No results found for this visit on 05/14/24 (from the past 1000 hour(s)).    Radiology Results: Reviewed  No results found.  No Chest XR results available for this patient.     Laboratory Results: I have personally reviewed all pertinent laboratory/tests results  Results from the past 24 hours: No results found for this or any previous visit (from the past 24 hour(s)).        This note was not shared with the patient due to reasonable likelihood of causing patient harm        Mya Cee MD 06/30/24  Department of Psychiatry and Behavioral Health  [facility]

## 2024-06-30 NOTE — NURSING NOTE
Patient remained visible on the unit throughout the evening. Cooperative with routine. Denied any unmet needs. HS medication compliant.

## 2024-07-01 PROCEDURE — 99232 SBSQ HOSP IP/OBS MODERATE 35: CPT | Performed by: STUDENT IN AN ORGANIZED HEALTH CARE EDUCATION/TRAINING PROGRAM

## 2024-07-01 RX ADMIN — SULFAMETHOXAZOLE AND TRIMETHOPRIM 1 TABLET: 800; 160 TABLET ORAL at 20:52

## 2024-07-01 RX ADMIN — FAMOTIDINE 20 MG: 20 TABLET ORAL at 17:23

## 2024-07-01 RX ADMIN — SULFAMETHOXAZOLE AND TRIMETHOPRIM 1 TABLET: 800; 160 TABLET ORAL at 08:19

## 2024-07-01 RX ADMIN — FAMOTIDINE 20 MG: 20 TABLET ORAL at 08:19

## 2024-07-01 RX ADMIN — ATORVASTATIN CALCIUM 10 MG: 10 TABLET, FILM COATED ORAL at 17:23

## 2024-07-01 RX ADMIN — ARIPIPRAZOLE 5 MG: 5 TABLET ORAL at 08:19

## 2024-07-01 RX ADMIN — SERTRALINE HYDROCHLORIDE 125 MG: 100 TABLET ORAL at 08:19

## 2024-07-01 RX ADMIN — LEVOTHYROXINE SODIUM 25 MCG: 25 TABLET ORAL at 05:56

## 2024-07-01 RX ADMIN — CYANOCOBALAMIN TAB 1000 MCG 1000 MCG: 1000 TAB at 08:19

## 2024-07-01 NOTE — PROGRESS NOTES
07/01/24 0932   Team Meeting   Meeting Type Daily Rounds   Team Members Present   Team Members Present Physician;;Nurse   Physician Team Member Amarilys   [facility] Noa   Patient/Family Present   Patient Present No   Patient's Family Present No     Pt reported no sleeping well. Pt is medication and meal compliant. Pt is calm and cooperative. Pt denies SI/HI/AVH. Pt's discharge is pending group home.

## 2024-07-01 NOTE — PLAN OF CARE
Pt not visible in groups over the weekend but does typically attend and participate in groups. Pt is becoming more comfortable and sharing more frequently. Pt does reports that he is more hopeful for the future. Pt does express some frustrations with his life up until this point because he was not supplied with the tools he needs to function in society since he was a child. Pt does acknowledge in just his hospitalization that he has completed and achieved new experiences and is able to see that he may be successful in obtaining the tools he needs.

## 2024-07-01 NOTE — NURSING NOTE
Pt is visible on the unit, pleasant and cooperative upon approach. Denies SI, HI, AH, and VH. Medication and meal compliant. Denies any needs at this time.

## 2024-07-01 NOTE — SOCIAL WORK
Cm completed the [facility] referral. CM gathered all required documents except a [employer] form that Cm outreached to get assistance with gathering this form.   CM still needs to have the documents signed by provider.   Pt reported he is agreeable to this referral.    Patient's [facility] Patient's chart was reviewed and orders were placed per standing orders.

## 2024-07-01 NOTE — NURSING NOTE
Patient is visible on unit and occasionally pacing the halls.  He reports doing well and denies SI/HI and A/V hallucinations.  Patient is med/meal compliant.

## 2024-07-01 NOTE — PROGRESS NOTES
"Progress Note - Behavioral Health   Franco Roberson 39 y.o. male MRN: [id number]  Unit/Bed#: [facility] 352-02 Encounter: [id number]    Subjective:   Per nursing report, patient has been doing well.  He has been calm and cooperative.  He has been medication compliant.  No acute behavioral issues overnight. Patient is doing well.  He remains pleasant and cooperative on the unit.  He reports that his mood is good.  He reports tolerating his medications without any adverse effects.  He does feel that Abilify in particular has been quite helpful.  He denies any questions or concerns at this time and denies any SI, HI, or AVH.    Behavior over the last 24 hours:  unchanged  Sleep: normal  Appetite: normal  Medication side effects: No  ROS: no complaints    Mental Status Evaluation:  Appearance:  dressed appropriately, disheveled, and marginal hygiene   Behavior:  calm, pleasant, and cooperative   Speech:  soft and scant   Mood:  \"Good\"   Affect:  constricted   Thought Process:  linear and goal directed   Associations: concrete associations   Thought Content:  no overt delusions   Perceptual Disturbances: denies auditory or visual hallucinations when asked, does not appear responding to internal stimuli   Risk Potential: Suicidal ideation - None  Homicidal ideation - None  Potential for aggression - Not at present   Sensorium:  oriented to person, place, and time/date   Memory:  recent and remote memory grossly intact   Consciousness:  alert and awake   Attention/Concentration: attention span and concentration are age appropriate   Insight:  limited   Judgment: limited   Gait/Station: normal gait/station, normal balance   Motor Activity: no abnormal movements     Medications: all current active meds have been reviewed.  Current Facility-Administered Medications   Medication Dose Route Frequency Provider Last Rate    acetaminophen  650 mg Oral Q6H PRN Basilio Brown MD      acetaminophen  650 mg Oral Q4H PRN Basilio Brown, " MD      acetaminophen  975 mg Oral Q6H PRN Basilio Brown MD      aluminum-magnesium hydroxide-simethicone  30 mL Oral Q4H PRN Basilio Brown MD      ARIPiprazole  5 mg Oral Daily Basilio Panda MD      Artificial Tears  1 drop Both Eyes Q3H PRN Basilio Brown MD      atorvastatin  10 mg Oral Daily With Dinner WALLY Baptiste      benztropine  1 mg Intramuscular Q4H PRN Max 6/day Basilio Brown MD      benztropine  1 mg Oral Q4H PRN Max 6/day Basilio Brown MD      cyanocobalamin  1,000 mcg Oral Daily Laura WALLY Olmos      cyanocobalamin  1,000 mcg Intramuscular Once Laura WALLY Olmos      Diclofenac Sodium  2 g Topical 4x Daily PRN WALLY Baptiste      hydrOXYzine HCL  50 mg Oral Q6H PRN Max 4/day Basilio Brown MD      Or    diphenhydrAMINE  50 mg Intramuscular Q6H PRN Basilio Brown MD      ergocalciferol  50,000 Units Oral Weekly WALLY Baptiste      famotidine  20 mg Oral BID WALLY Baptiste      hydrOXYzine HCL  100 mg Oral Q6H PRN Max 4/day Basilio Brown MD      Or    LORazepam  2 mg Intramuscular Q6H PRN Basilio Brown MD      hydrOXYzine HCL  25 mg Oral Q6H PRN Max 4/day Basilio Brown MD      levothyroxine  25 mcg Oral Early Morning ARJUN BaptisteNP      methocarbamol  500 mg Oral Q6H PRN ARJUN BaptisteNP      OLANZapine  5 mg Oral Q4H PRN Max 3/day Basilio Brown MD      Or    OLANZapine  2.5 mg Intramuscular Q4H PRN Max 3/day Basilio Brown MD      OLANZapine  5 mg Oral Q3H PRN Max 3/day Basilio Brown MD      Or    OLANZapine  5 mg Intramuscular Q3H PRN Max 3/day Basilio Brown MD      OLANZapine  2.5 mg Oral Q4H PRN Max 6/day Basilio Brown MD      polyethylene glycol  17 g Oral Daily PRN Basilio Brown MD      propranolol  10 mg Oral Q8H PRN Basilio Brown MD      senna-docusate sodium  1 tablet Oral Daily PRN  Basilio Brown MD      sertraline  125 mg Oral Daily Rachana WALLY Leyva      sulfamethoxazole-trimethoprim  1 tablet Oral Q12H YANA iRsa Dickson PA-C      traZODone  50 mg Oral HS PRN Basilio Brown MD         Labs: I have personally reviewed all pertinent laboratory/tests results  Most Recent Labs:   Lab Results   Component Value Date    WBC 7.01 06/27/2024    RBC 4.54 06/27/2024    HGB 14.5 06/27/2024    HCT 44.4 06/27/2024     06/27/2024    RDW 12.0 06/27/2024    NEUTROABS 4.01 06/27/2024    SODIUM 138 06/27/2024    K 4.5 06/27/2024     06/27/2024    CO2 30 06/27/2024    BUN 21 06/27/2024    CREATININE 1.02 06/27/2024    GLUC 85 06/27/2024    CALCIUM 9.7 06/27/2024    AST 20 06/27/2024    ALT 22 06/27/2024    ALKPHOS 68 06/27/2024    TP 7.9 06/27/2024    ALB 4.4 06/27/2024    TBILI 0.32 06/27/2024    CHOLESTEROL 191 05/15/2024    HDL 44 05/15/2024    TRIG 116 05/15/2024    LDLCALC 124 (H) 05/15/2024    NONHDLC 147 05/15/2024    [id number] 5.162 (H) 05/15/2024    FREET4 0.60 (L) 05/15/2024    SYPHILISAB Non-reactive 05/15/2024         Assessment & Plan   Principal Problem:    MDD (major depressive disorder), recurrent severe, without psychosis (HCC)  Active Problems:    Unspecified depressive disorder (HCC)    Medical clearance for psychiatric admission    Hyperlipidemia    Vitamin D deficiency    B12 deficiency    Hypothyroidism    Rule Out Autism spectrum disorder    Recommended Treatment:   Continue Zoloft 125 mg daily.  Continue Abilify 5 mg daily.  Continue all other medications at current doses as above.  Encourage group therapy, milieu therapy and occupational therapy  All current active medications have been reviewed  Encourage group therapy, milieu therapy and occupational therapy  Behavioral Health checks every 7 minutes  Await placement    ----------------------------------------    Progress Toward Goals: progressing    Risks / Benefits of Treatment:    Risks,  benefits, and possible side effects of medications explained to patient and patient verbalizes understanding and agreement for treatment.    Counseling / Coordination of Care:    Patient's progress discussed with staff in treatment team meeting.  Medications, treatment progress and treatment plan reviewed with patient.    Basilio Panda MD 07/01/24

## 2024-07-02 PROCEDURE — 99232 SBSQ HOSP IP/OBS MODERATE 35: CPT | Performed by: STUDENT IN AN ORGANIZED HEALTH CARE EDUCATION/TRAINING PROGRAM

## 2024-07-02 RX ADMIN — SULFAMETHOXAZOLE AND TRIMETHOPRIM 1 TABLET: 800; 160 TABLET ORAL at 09:01

## 2024-07-02 RX ADMIN — SULFAMETHOXAZOLE AND TRIMETHOPRIM 1 TABLET: 800; 160 TABLET ORAL at 21:36

## 2024-07-02 RX ADMIN — LEVOTHYROXINE SODIUM 25 MCG: 25 TABLET ORAL at 06:06

## 2024-07-02 RX ADMIN — ATORVASTATIN CALCIUM 10 MG: 10 TABLET, FILM COATED ORAL at 17:12

## 2024-07-02 RX ADMIN — SERTRALINE HYDROCHLORIDE 125 MG: 100 TABLET ORAL at 09:01

## 2024-07-02 RX ADMIN — FAMOTIDINE 20 MG: 20 TABLET ORAL at 09:01

## 2024-07-02 RX ADMIN — ARIPIPRAZOLE 5 MG: 5 TABLET ORAL at 09:01

## 2024-07-02 RX ADMIN — FAMOTIDINE 20 MG: 20 TABLET ORAL at 17:12

## 2024-07-02 RX ADMIN — CYANOCOBALAMIN TAB 1000 MCG 1000 MCG: 1000 TAB at 09:01

## 2024-07-02 NOTE — SOCIAL WORK
Pt more isolative and quiet today. Writer spoke with pt. Pt feeling more irritable and agitated and does not want to interact today. Pt offered support.

## 2024-07-02 NOTE — PROGRESS NOTES
07/02/24 0907   Team Meeting   Meeting Type Daily Rounds   Team Members Present   Team Members Present Physician;;Nurse   Physician Team Member Amarilys   Nursing Team Member [facility] Management Team Member Noa   Patient/Family Present   Patient Present No   Patient's Family Present No     Pt is medication and meal compliant. Pt's medication and meal compliant. Pt's discharge is pending [facility].

## 2024-07-02 NOTE — PROGRESS NOTES
"Progress Note - Behavioral Health   Franco Roberson 39 y.o. male MRN: [id number]  Unit/Bed#: [facility] 352-02 Encounter: [id number]    Subjective:   Per nursing report, patient has been doing well. He has remained pleasant and cooperative. He participates in groups. He has been medication compliant. No acute behavioral issues overnight. Patient is doing well today. He reports that his mood is good. He denies problems tolerating medications. He denies any SI, HI, or AVH. He continues to participate appropriately on the unit. He denies questions or concerns at this time.    Behavior over the last 24 hours:  unchanged  Sleep: normal  Appetite: normal  Medication side effects: No  ROS: no complaints and all other systems are negative    Mental Status Evaluation:  Appearance:  dressed appropriately, disheveled, and marginal hygiene   Behavior:  calm, pleasant, cooperative, and guarded   Speech:  soft and scant   Mood:  \"good\"   Affect:  constricted   Thought Process:  linear and goal directed   Associations: concrete associations   Thought Content:  no overt delusions   Perceptual Disturbances: denies auditory or visual hallucinations when asked, does not appear responding to internal stimuli   Risk Potential: Suicidal ideation - None  Homicidal ideation - None  Potential for aggression - Not at present   Sensorium:  oriented to person, place, and time/date   Memory:  recent and remote memory grossly intact   Consciousness:  alert and awake   Attention/Concentration: attention span and concentration are age appropriate   Insight:  limited   Judgment: limited   Gait/Station: normal gait/station, normal balance   Motor Activity: no abnormal movements     Medications: all current active meds have been reviewed.  Current Facility-Administered Medications   Medication Dose Route Frequency Provider Last Rate    acetaminophen  650 mg Oral Q6H PRN Basilio Brown MD      acetaminophen  650 mg Oral Q4H PRN Basilio Brown MD      " acetaminophen  975 mg Oral Q6H PRN Basilio Brown MD      aluminum-magnesium hydroxide-simethicone  30 mL Oral Q4H PRN Basilio Brown MD      ARIPiprazole  5 mg Oral Daily Basilio Panda MD      Artificial Tears  1 drop Both Eyes Q3H PRN Basilio Brown MD      atorvastatin  10 mg Oral Daily With Dinner WALLY Baptiste      benztropine  1 mg Intramuscular Q4H PRN Max 6/day Basilio Brown MD      benztropine  1 mg Oral Q4H PRN Max 6/day Basilio Brown MD      cyanocobalamin  1,000 mcg Oral Daily Laura WALLY Olmos      cyanocobalamin  1,000 mcg Intramuscular Once Laura WALLY Olmos      Diclofenac Sodium  2 g Topical 4x Daily PRN WALLY Baptiste      hydrOXYzine HCL  50 mg Oral Q6H PRN Max 4/day Basilio Brown MD      Or    diphenhydrAMINE  50 mg Intramuscular Q6H PRN Basilio Brown MD      ergocalciferol  50,000 Units Oral Weekly WALLY Baptiste      famotidine  20 mg Oral BID WALLY Baptiste      hydrOXYzine HCL  100 mg Oral Q6H PRN Max 4/day Basilio Brown MD      Or    LORazepam  2 mg Intramuscular Q6H PRN Basilio Brown MD      hydrOXYzine HCL  25 mg Oral Q6H PRN Max 4/day Basilio Brown MD      levothyroxine  25 mcg Oral Early Morning ARJUN BaptisteNP      methocarbamol  500 mg Oral Q6H PRN WALLY Baptiste      OLANZapine  5 mg Oral Q4H PRN Max 3/day Basilio Brown MD      Or    OLANZapine  2.5 mg Intramuscular Q4H PRN Max 3/day Basilio Brown MD      OLANZapine  5 mg Oral Q3H PRN Max 3/day Basilio Brown MD      Or    OLANZapine  5 mg Intramuscular Q3H PRN Max 3/day Basilio Brown MD      OLANZapine  2.5 mg Oral Q4H PRN Max 6/day Basilio Brown MD      polyethylene glycol  17 g Oral Daily PRN Basilio Brown MD      propranolol  10 mg Oral Q8H PRN Basilio Brown MD      senna-docusate sodium  1 tablet Oral Daily PRN Basilio Garcia  MD Karyn      sertraline  125 mg Oral Daily Rachanacesilia KaplanWALLY Mcnair      sulfamethoxazole-trimethoprim  1 tablet Oral Q12H [facility] Risa Dickson PA-C      traZODone  50 mg Oral HS PRN Basilio Brown MD         Labs: I have personally reviewed all pertinent laboratory/tests results  Most Recent Labs:   Lab Results   Component Value Date    WBC 7.01 06/27/2024    RBC 4.54 06/27/2024    HGB 14.5 06/27/2024    HCT 44.4 06/27/2024     06/27/2024    RDW 12.0 06/27/2024    NEUTROABS 4.01 06/27/2024    SODIUM 138 06/27/2024    K 4.5 06/27/2024     06/27/2024    CO2 30 06/27/2024    BUN 21 06/27/2024    CREATININE 1.02 06/27/2024    GLUC 85 06/27/2024    CALCIUM 9.7 06/27/2024    AST 20 06/27/2024    ALT 22 06/27/2024    ALKPHOS 68 06/27/2024    TP 7.9 06/27/2024    ALB 4.4 06/27/2024    TBILI 0.32 06/27/2024    CHOLESTEROL 191 05/15/2024    HDL 44 05/15/2024    TRIG 116 05/15/2024    LDLCALC 124 (H) 05/15/2024    NONHDLC 147 05/15/2024    [id number] 5.162 (H) 05/15/2024    FREET4 0.60 (L) 05/15/2024    SYPHILISAB Non-reactive 05/15/2024         Assessment & Plan   Principal Problem:    MDD (major depressive disorder), recurrent severe, without psychosis (HCC)  Active Problems:    Unspecified depressive disorder (HCC)    Medical clearance for psychiatric admission    Hyperlipidemia    Vitamin D deficiency    B12 deficiency    Hypothyroidism    Rule Out Autism spectrum disorder    Recommended Treatment:   Continue Zoloft 125 mg daily.  Continue Abilify 5 mg daily.  Continue all other medications at current doses as above.  Encourage group therapy, milieu therapy and occupational therapy  All current active medications have been reviewed  Encourage group therapy, milieu therapy and occupational therapy  Behavioral Health checks every 7 minutes    ----------------------------------------    Progress Toward Goals: progressing    Risks / Benefits of Treatment:    Risks, benefits, and possible side  effects of medications explained to patient and patient verbalizes understanding and agreement for treatment.    Counseling / Coordination of Care:    Patient's progress discussed with staff in treatment team meeting.  Medications, treatment progress and treatment plan reviewed with patient.    Basilio Panda MD 07/02/24

## 2024-07-02 NOTE — NURSING NOTE
Pt is calm and cooperative upon approach. Visible at times. Pt is medication and meal compliant. Denies all psych symptoms. Denies any needs at this time.

## 2024-07-02 NOTE — NURSING NOTE
Pt visible on unit and social with select peers and staff. Walks in halls and denies all psych symptoms on approach. Calm and cooperative with unit routines and care. Reports no pain, anxiety or unmet needs. Safety checks ongoing.

## 2024-07-02 NOTE — SOCIAL WORK
CM received verification and signature for Pt's referral to [facility]. Cm requested attending to put in quantiferon gold  for testing for TB for the referral. Once results are in, Cm will submit the referral.     Pt denied ever getting COVID vaccine.

## 2024-07-03 PROCEDURE — 99232 SBSQ HOSP IP/OBS MODERATE 35: CPT | Performed by: STUDENT IN AN ORGANIZED HEALTH CARE EDUCATION/TRAINING PROGRAM

## 2024-07-03 PROCEDURE — 86480 TB TEST CELL IMMUN MEASURE: CPT | Performed by: STUDENT IN AN ORGANIZED HEALTH CARE EDUCATION/TRAINING PROGRAM

## 2024-07-03 RX ADMIN — ARIPIPRAZOLE 5 MG: 5 TABLET ORAL at 08:20

## 2024-07-03 RX ADMIN — SERTRALINE HYDROCHLORIDE 125 MG: 100 TABLET ORAL at 08:20

## 2024-07-03 RX ADMIN — FAMOTIDINE 20 MG: 20 TABLET ORAL at 17:35

## 2024-07-03 RX ADMIN — CYANOCOBALAMIN TAB 1000 MCG 1000 MCG: 1000 TAB at 08:20

## 2024-07-03 RX ADMIN — SULFAMETHOXAZOLE AND TRIMETHOPRIM 1 TABLET: 800; 160 TABLET ORAL at 08:20

## 2024-07-03 RX ADMIN — ERGOCALCIFEROL 50000 UNITS: 1.25 CAPSULE ORAL at 08:20

## 2024-07-03 RX ADMIN — FAMOTIDINE 20 MG: 20 TABLET ORAL at 08:20

## 2024-07-03 RX ADMIN — LEVOTHYROXINE SODIUM 25 MCG: 25 TABLET ORAL at 05:05

## 2024-07-03 RX ADMIN — SULFAMETHOXAZOLE AND TRIMETHOPRIM 1 TABLET: 800; 160 TABLET ORAL at 21:04

## 2024-07-03 RX ADMIN — ATORVASTATIN CALCIUM 10 MG: 10 TABLET, FILM COATED ORAL at 17:35

## 2024-07-03 NOTE — PROGRESS NOTES
07/03/24 0922   Team Meeting   Meeting Type Daily Rounds   Team Members Present   Team Members Present Physician;Nurse;   Physician Team Member Amarilys   [facility]   Care Management Team Member Noa   Patient/Family Present   Patient Present No   Patient's Family Present No     Pt is medication and meal compliant. Pt is calm and cooperative. Pt reported he has had trouble sleeping. Pt's discharge is pending [facility].

## 2024-07-03 NOTE — NURSING NOTE
"Pt is calm and cooperative upon approach. Pt reports feeling \"fine\". Appropriate with staff and select peers. Denies SI, HI, AH, and VH. Medication and meal complaint. Denies any needs at this time.   "

## 2024-07-03 NOTE — NURSING NOTE
Pt visible on unit and attending group this late evening. Selectively social with peers. Pleasant in conversation with staff. Denies all psych symptoms and needs. Compliant with care. Safety checks continue.

## 2024-07-03 NOTE — PLAN OF CARE
Pt attends groups fairly regularly and has been working toward participating more. Pt does participate more or less depending on other peers present.

## 2024-07-03 NOTE — PROGRESS NOTES
"Progress Note - Behavioral Health   Franco Roberson 39 y.o. male MRN: [id number]  Unit/Bed#: [facility] 352-02 Encounter: [id number]    Subjective:   Per nursing report, patient has been doing well.  He has been medication compliant.  He has been without any acute behavioral issues.  He is awaiting placement. Patient is feeling well today.  He reports that his mood remains good.  He does attend some groups but does not prefer to do art group as he has not enjoyed doing art work.  He denies any problems tolerating medications.  He denies any SI, HI, or AVH.    Behavior over the last 24 hours:  unchanged  Sleep: poor  Appetite: normal  Medication side effects: No  ROS: no complaints and all other systems are negative    Mental Status Evaluation:  Appearance:  dressed appropriately, disheveled, and marginal hygiene   Behavior:  calm, pleasant, cooperative, and guarded   Speech:  soft and scant   Mood:  \"Good\"   Affect:  constricted   Thought Process:  linear and goal directed   Associations: concrete associations   Thought Content:  no overt delusions   Perceptual Disturbances: denies auditory or visual hallucinations when asked, does not appear responding to internal stimuli   Risk Potential: Suicidal ideation - None  Homicidal ideation - None  Potential for aggression - Not at present   Sensorium:  oriented to person, place, and time/date   Memory:  recent and remote memory grossly intact   Consciousness:  alert and awake   Attention/Concentration: attention span and concentration are age appropriate   Insight:  limited   Judgment: limited   Gait/Station: normal gait/station, normal balance   Motor Activity: no abnormal movements     Medications: all current active meds have been reviewed.  Current Facility-Administered Medications   Medication Dose Route Frequency Provider Last Rate    acetaminophen  650 mg Oral Q6H PRN Basilio Brown MD      acetaminophen  650 mg Oral Q4H PRN Basilio Brown MD      acetaminophen  975 " mg Oral Q6H PRN Basilio Brown MD      aluminum-magnesium hydroxide-simethicone  30 mL Oral Q4H PRN Basilio Brown MD      ARIPiprazole  5 mg Oral Daily Basilio Panda MD      Artificial Tears  1 drop Both Eyes Q3H PRN Basilio Brown MD      atorvastatin  10 mg Oral Daily With Dinner WALLY Baptiste      benztropine  1 mg Intramuscular Q4H PRN Max 6/day Basilio Brown MD      benztropine  1 mg Oral Q4H PRN Max 6/day Basilio Brown MD      cyanocobalamin  1,000 mcg Oral Daily Laura WALLY Olmos      cyanocobalamin  1,000 mcg Intramuscular Once Laura WALLY Olmos      Diclofenac Sodium  2 g Topical 4x Daily PRN WALLY Baptiste      hydrOXYzine HCL  50 mg Oral Q6H PRN Max 4/day Basilio Brown MD      Or    diphenhydrAMINE  50 mg Intramuscular Q6H PRN Basilio Brown MD      ergocalciferol  50,000 Units Oral Weekly Laura WALLY Olmos      famotidine  20 mg Oral BID WALLY Baptiste      hydrOXYzine HCL  100 mg Oral Q6H PRN Max 4/day Basilio Brown MD      Or    LORazepam  2 mg Intramuscular Q6H PRN Basilio Brown MD      hydrOXYzine HCL  25 mg Oral Q6H PRN Max 4/day Basilio Brown MD      levothyroxine  25 mcg Oral Early Morning ARJUN BaptisteNP      methocarbamol  500 mg Oral Q6H PRN WALLY Baptiste      OLANZapine  5 mg Oral Q4H PRN Max 3/day Basilio Brown MD      Or    OLANZapine  2.5 mg Intramuscular Q4H PRN Max 3/day Basilio Brown MD      OLANZapine  5 mg Oral Q3H PRN Max 3/day Basilio Brown MD      Or    OLANZapine  5 mg Intramuscular Q3H PRN Max 3/day Basilio Brown MD      OLANZapine  2.5 mg Oral Q4H PRN Max 6/day Basilio Brown MD      polyethylene glycol  17 g Oral Daily PRN Basilio Brown MD      propranolol  10 mg Oral Q8H PRN Basilio Brown MD      senna-docusate sodium  1 tablet Oral Daily PRN Basilio Brown MD       sertraline  125 mg Oral Daily Rachana KaplanWALLY Mcnair      sulfamethoxazole-trimethoprim  1 tablet Oral Q12H [facility] Risa Dickson PA-C      traZODone  50 mg Oral HS PRN Basilio Brown MD         Labs: I have personally reviewed all pertinent laboratory/tests results  Most Recent Labs:   Lab Results   Component Value Date    WBC 7.01 06/27/2024    RBC 4.54 06/27/2024    HGB 14.5 06/27/2024    HCT 44.4 06/27/2024     06/27/2024    RDW 12.0 06/27/2024    NEUTROABS 4.01 06/27/2024    SODIUM 138 06/27/2024    K 4.5 06/27/2024     06/27/2024    CO2 30 06/27/2024    BUN 21 06/27/2024    CREATININE 1.02 06/27/2024    GLUC 85 06/27/2024    CALCIUM 9.7 06/27/2024    AST 20 06/27/2024    ALT 22 06/27/2024    ALKPHOS 68 06/27/2024    TP 7.9 06/27/2024    ALB 4.4 06/27/2024    TBILI 0.32 06/27/2024    CHOLESTEROL 191 05/15/2024    HDL 44 05/15/2024    TRIG 116 05/15/2024    LDLCALC 124 (H) 05/15/2024    NONHDLC 147 05/15/2024    [id number] 5.162 (H) 05/15/2024    FREET4 0.60 (L) 05/15/2024    SYPHILISAB Non-reactive 05/15/2024         Assessment & Plan   Principal Problem:    MDD (major depressive disorder), recurrent severe, without psychosis (HCC)  Active Problems:    Unspecified depressive disorder (HCC)    Medical clearance for psychiatric admission    Hyperlipidemia    Vitamin D deficiency    B12 deficiency    Hypothyroidism    Rule Out Autism spectrum disorder    Recommended Treatment:   Continue Zoloft 125 mg daily.  Continue Abilify 5 mg daily.  Continue all other medications at current doses as above.  Encourage group therapy, milieu therapy and occupational therapy  All current active medications have been reviewed  Encourage group therapy, milieu therapy and occupational therapy  Behavioral Health checks every 7 minutes  Await placement    ----------------------------------------    Progress Toward Goals: progressing    Risks / Benefits of Treatment:    Risks, benefits, and possible side  effects of medications explained to patient and patient verbalizes understanding and agreement for treatment.    Counseling / Coordination of Care:    Patient's progress discussed with staff in treatment team meeting.  Medications, treatment progress and treatment plan reviewed with patient.    Basilio Panda MD 07/03/24

## 2024-07-03 NOTE — PLAN OF CARE
Problem: Ineffective Coping  Goal: Identifies ineffective coping skills  Outcome: Progressing  Goal: Identifies healthy coping skills  Outcome: Progressing  Goal: Demonstrates healthy coping skills  Outcome: Progressing  Goal: Participates in unit activities  Description: Interventions:  - Provide therapeutic environment   - Provide required programming   - Redirect inappropriate behaviors   Outcome: Progressing     Problem: Risk for Self Injury/Neglect  Goal: Treatment Goal: Remain safe during length of stay, learn and adopt new coping skills, and be free of self-injurious ideation, impulses and acts at the time of discharge  Outcome: Progressing     Problem: Depression  Goal: Treatment Goal: Demonstrate behavioral control of depressive symptoms, verbalize feelings of improved mood/affect, and adopt new coping skills prior to discharge  Outcome: Progressing  Goal: Verbalize thoughts and feelings  Description: Interventions:  - Assess and re-assess patient's level of risk   - Engage patient in 1:1 interactions, daily, for a minimum of 15 minutes   - Encourage patient to express feelings, fears, frustrations, hopes   Outcome: Progressing  Goal: Refrain from harming self  Description: Interventions:  - Monitor patient closely, per order   - Supervise medication ingestion, monitor effects and side effects   Outcome: Progressing  Goal: Refrain from isolation  Description: Interventions:  - Develop a trusting relationship   - Encourage socialization   Outcome: Progressing  Goal: Refrain from self-neglect  Outcome: Progressing  Goal: Attend and participate in unit activities, including therapeutic, recreational, and educational groups  Description: Interventions:  - Provide therapeutic and educational activities daily, encourage attendance and participation, and document same in the medical record   Outcome: Progressing  Goal: Complete daily ADLs, including personal hygiene independently, as able  Description:  Interventions:  - Observe, teach, and assist patient with ADLS  -  Monitor and promote a balance of rest/activity, with adequate nutrition and elimination   Outcome: Progressing     Problem: Anxiety  Goal: Anxiety is at manageable level  Description: Interventions:  - Assess and monitor patient's anxiety level.   - Monitor for signs and symptoms (heart palpitations, chest pain, shortness of breath, headaches, nausea, feeling jumpy, restlessness, irritable, apprehensive).   - Collaborate with interdisciplinary team and initiate plan and interventions as ordered.  - [place] patient to unit/surroundings  - Explain treatment plan  - Encourage participation in care  - Encourage verbalization of concerns/fears  - Identify coping mechanisms  - Assist in developing anxiety-reducing skills  - Administer/offer alternative therapies  - Limit or eliminate stimulants  Outcome: Progressing     Problem: DISCHARGE PLANNING - CARE MANAGEMENT  Goal: Discharge to post-acute care or home with appropriate resources  Description: INTERVENTIONS:  - Conduct assessment to determine patient/family and health care team treatment goals, and need for post-acute services based on payer coverage, community resources, and patient preferences, and barriers to discharge  - Address psychosocial, clinical, and financial barriers to discharge as identified in assessment in conjunction with the patient/family and health care team  - Arrange appropriate level of post-acute services according to patient’s   needs and preference and payer coverage in collaboration with the physician and health care team  - Communicate with and update the patient/family, physician, and health care team regarding progress on the discharge plan  - Arrange appropriate transportation to post-acute venues  Outcome: Progressing

## 2024-07-03 NOTE — NURSING NOTE
Attempts x 2 to obtain blood work without success. Pt encouraged to increase fluid intake and was given large cup of water. Will attempt later on.

## 2024-07-04 PROCEDURE — 99232 SBSQ HOSP IP/OBS MODERATE 35: CPT | Performed by: STUDENT IN AN ORGANIZED HEALTH CARE EDUCATION/TRAINING PROGRAM

## 2024-07-04 RX ADMIN — FAMOTIDINE 20 MG: 20 TABLET ORAL at 08:49

## 2024-07-04 RX ADMIN — ARIPIPRAZOLE 5 MG: 5 TABLET ORAL at 08:49

## 2024-07-04 RX ADMIN — SERTRALINE HYDROCHLORIDE 125 MG: 100 TABLET ORAL at 08:49

## 2024-07-04 RX ADMIN — FAMOTIDINE 20 MG: 20 TABLET ORAL at 17:03

## 2024-07-04 RX ADMIN — LEVOTHYROXINE SODIUM 25 MCG: 25 TABLET ORAL at 06:49

## 2024-07-04 RX ADMIN — CHOLECALCIFEROL TAB 25 MCG (1000 UNIT) 2000 UNITS: 25 TAB at 09:17

## 2024-07-04 RX ADMIN — ATORVASTATIN CALCIUM 10 MG: 10 TABLET, FILM COATED ORAL at 17:00

## 2024-07-04 RX ADMIN — CYANOCOBALAMIN TAB 1000 MCG 1000 MCG: 1000 TAB at 08:49

## 2024-07-04 NOTE — NURSING NOTE
Lab called to inform that redraw for Quantiferon will be required. Must use Quantiferon kit (has four separate tubes) and must be submitted within 14 hrs.     FYI: Please redraw on Friday morning, as services will be held July 4th due to Holiday.

## 2024-07-04 NOTE — NURSING NOTE
Quantiferon kit labs for Tb test obtained after multiple attempts and accepted by lab at this time.

## 2024-07-04 NOTE — NURSING NOTE
Patient has been in the milieu all evening and attended and participated in group. He is pleasant and cooperative in the milieu. He is asking when the Bactrim will be d/c'd.  He denies S.I H.I.A/H V/H   Your rapid mono test in the office today was negative.   We were unable to test you for strep as you could not tolerate the test. However, based on your symptoms and exam in the office today, I suspect that you have strep pharyngitis.    Take Amoxicillin 2 times per day for 10 days.   Complete the entire course of antibiotics even after you feel better.   Warm salt water gargles ( 1/2 tsp salt in 8 oz water) 2-3 times per day for sore throat.  Continue Acetaminophen and Ibuprofen as directed with food as needed for pain.   Increase yogurt intake and/or take an oral probiotic (not within 2 hours of the antibiotic), to prevent side effects from the antibiotic.    Thank you for choosing [facility]!

## 2024-07-04 NOTE — NURSING NOTE
Patient about the unit. Quiet and to himself. Pleasant, calm and cooperative. Denies symptoms and was compliant with morning medications.

## 2024-07-04 NOTE — PLAN OF CARE
Problem: Ineffective Coping  Goal: Identifies ineffective coping skills  Outcome: Progressing  Goal: Identifies healthy coping skills  Outcome: Progressing  Goal: Demonstrates healthy coping skills  Outcome: Progressing  Goal: Participates in unit activities  Description: Interventions:  - Provide therapeutic environment   - Provide required programming   - Redirect inappropriate behaviors   Outcome: Progressing     Problem: Risk for Self Injury/Neglect  Goal: Treatment Goal: Remain safe during length of stay, learn and adopt new coping skills, and be free of self-injurious ideation, impulses and acts at the time of discharge  Outcome: Progressing     Problem: Depression  Goal: Treatment Goal: Demonstrate behavioral control of depressive symptoms, verbalize feelings of improved mood/affect, and adopt new coping skills prior to discharge  Outcome: Progressing  Goal: Verbalize thoughts and feelings  Description: Interventions:  - Assess and re-assess patient's level of risk   - Engage patient in 1:1 interactions, daily, for a minimum of 15 minutes   - Encourage patient to express feelings, fears, frustrations, hopes   Outcome: Progressing  Goal: Refrain from harming self  Description: Interventions:  - Monitor patient closely, per order   - Supervise medication ingestion, monitor effects and side effects   Outcome: Progressing  Goal: Refrain from isolation  Description: Interventions:  - Develop a trusting relationship   - Encourage socialization   Outcome: Progressing  Goal: Refrain from self-neglect  Outcome: Progressing  Goal: Attend and participate in unit activities, including therapeutic, recreational, and educational groups  Description: Interventions:  - Provide therapeutic and educational activities daily, encourage attendance and participation, and document same in the medical record   Outcome: Progressing  Goal: Complete daily ADLs, including personal hygiene independently, as able  Description:  Interventions:  - Observe, teach, and assist patient with ADLS  -  Monitor and promote a balance of rest/activity, with adequate nutrition and elimination   Outcome: Progressing     Problem: Anxiety  Goal: Anxiety is at manageable level  Description: Interventions:  - Assess and monitor patient's anxiety level.   - Monitor for signs and symptoms (heart palpitations, chest pain, shortness of breath, headaches, nausea, feeling jumpy, restlessness, irritable, apprehensive).   - Collaborate with interdisciplinary team and initiate plan and interventions as ordered.  - [place] patient to unit/surroundings  - Explain treatment plan  - Encourage participation in care  - Encourage verbalization of concerns/fears  - Identify coping mechanisms  - Assist in developing anxiety-reducing skills  - Administer/offer alternative therapies  - Limit or eliminate stimulants  Outcome: Progressing     Problem: Knowledge Deficit  Goal: Patient/family/caregiver demonstrates understanding of disease process, treatment plan, medications, and discharge instructions  Description: Complete learning assessment and assess knowledge base.  Interventions:  - Provide teaching at level of understanding  - Provide teaching via preferred learning methods  Outcome: Progressing

## 2024-07-04 NOTE — PROGRESS NOTES
"Progress Note - Behavioral Health   Franco Roberson 39 y.o. male MRN: [id number]  Unit/Bed#: U 344-01 Encounter: [id number]    Assessment & Plan   Principal Problem:    MDD (major depressive disorder), recurrent severe, without psychosis (HCC)  Active Problems:    Unspecified depressive disorder (HCC)    Medical clearance for psychiatric admission    Hyperlipidemia    Vitamin D deficiency    B12 deficiency    Hypothyroidism    Rule Out Autism spectrum disorder      Continue medications as noted below.  Zoloft 100 mg once daily for depressed mood, anxiety  Abilify 5 mg one daily for adjunct for depressed mood  Continue to promote patient participation in therapeutic milieu.  Continue medical management by primary team.    Subjective:  The patient was evaluated this morning for continuity of care and no acute distress noted throughout the evaluation. Over the past 24 hours, staff noted patient has been pleasant, awaiting placement. On interview today, patient is pleasant, calm, states he came in in the setting of depressed mood and not eating. He feels he is \"much better\" over the course of this hospitalization. He denies SI, HI, AH, VH.       Current Medications:  Current Facility-Administered Medications   Medication Dose Route Frequency Provider Last Rate    acetaminophen  650 mg Oral Q6H PRN Basilio Brown MD      acetaminophen  650 mg Oral Q4H PRN Basilio Brown MD      acetaminophen  975 mg Oral Q6H PRN Basilio Brown MD      aluminum-magnesium hydroxide-simethicone  30 mL Oral Q4H PRN Basilio Brown MD      ARIPiprazole  5 mg Oral Daily Basilio Panda MD      Artificial Tears  1 drop Both Eyes Q3H PRN Basilio Brown MD      atorvastatin  10 mg Oral Daily With Dinner WALLY Baptiste      benztropine  1 mg Intramuscular Q4H PRN Max 6/day Basilio Brown MD      benztropine  1 mg Oral Q4H PRN Max 6/day Basilio Brown MD      Cholecalciferol  2,000 Units Oral Daily " "Laura WALLY Olmos      cyanocobalamin  1,000 mcg Oral Daily LauraWALLY Tony      Diclofenac Sodium  2 g Topical 4x Daily PRN WALLY Baptiste      hydrOXYzine HCL  50 mg Oral Q6H PRN Max 4/day Basilio Brown MD      Or    diphenhydrAMINE  50 mg Intramuscular Q6H PRN Basilio Brown MD      famotidine  20 mg Oral BID WALLY Baptiste      hydrOXYzine HCL  100 mg Oral Q6H PRN Max 4/day Basilio Brown MD      Or    LORazepam  2 mg Intramuscular Q6H PRN Basilio Brown MD      hydrOXYzine HCL  25 mg Oral Q6H PRN Max 4/day Basilio Brown MD      levothyroxine  25 mcg Oral Early Morning WALLY Baptiste      methocarbamol  500 mg Oral Q6H PRN WALLY Baptiste      OLANZapine  5 mg Oral Q4H PRN Max 3/day Basilio Brown MD      Or    OLANZapine  2.5 mg Intramuscular Q4H PRN Max 3/day Basilio Brown MD      OLANZapine  5 mg Oral Q3H PRN Max 3/day Basilio Brown MD      Or    OLANZapine  5 mg Intramuscular Q3H PRN Max 3/day Basilio Brown MD      OLANZapine  2.5 mg Oral Q4H PRN Max 6/day Basilio Bronw MD      polyethylene glycol  17 g Oral Daily PRN Basilio Brown MD      propranolol  10 mg Oral Q8H PRN Basilio Brown MD      senna-docusate sodium  1 tablet Oral Daily PRN Basilio Brown MD      sertraline  125 mg Oral Daily WALLY Gomez      traZODone  50 mg Oral HS PRN Basilio Brown MD         Behavioral Health Medications: all current active meds have been reviewed.    Vital signs in last 24 hours:  Temp:  [96.8 °F (36 °C)-96.9 °F (36.1 °C)] 96.8 °F (36 °C)  HR:  [65-82] 82  Resp:  [16] 16  BP: (131-143)/(78-85) 143/78    Laboratory results:  I have personally reviewed all pertinent laboratory/tests results.  Labs in last 72 hours: No results for input(s): \"WBC\", \"RBC\", \"HGB\", \"HCT\", \"PLT\", \"RDW\", \"TOTANEUTABS\", \"NEUTROABS\", \"SODIUM\", \"K\", \"CL\", \"CO2\", \"BUN\", " "\"CREATININE\", \"GLUC\", \"GLUF\", \"CALCIUM\", \"AST\", \"ALT\", \"ALKPHOS\", \"TP\", \"ALB\", \"TBILI\", \"CHOLESTEROL\", \"HDL\", \"TRIG\", \"LDLCALC\", \"VALPROICTOT\", \"CARBAMAZEPIN\", \"LITHIUM\", \"AMMONIA\", \"[id number]\", \"FREET4\", \"T3FREE\", \"PREGTESTUR\", \"PREGSERUM\", \"HCG\", \"HCGQUANT\", \"RPR\" in the last 72 hours.    Psychiatric Review of Systems:  Behavior over the last 24 hours:  unchanged  Sleep: improved  Appetite: normal  Medication side effects: No   ROS: all other systems are negative    Mental Status Evaluation:    Appearance:  marginal hygiene   Behavior:  normal, pleasant, cooperative   Speech:  normal rate, normal volume, normal pitch   Mood:  \"Better\"   Affect:  constricted   Thought Process:  organized   Associations:  intact associations   Thought Content:  no overt delusions   Perceptual Disturbances: denies auditory hallucinations when asked, does not appear responding to internal stimuli   Risk Potential: Suicidal ideation - None at present  Homicidal ideation - None  Potential for aggression - No   Sensorium:  oriented to person, place, and time/date   Memory:  recent and remote memory grossly intact   Consciousness:  alert and awake   Attention/Concentration: attention span and concentration are age appropriate   Insight:  limited   Judgment: limited   Gait/Station: normal gait/station   Motor Activity: no abnormal movements         Progress Toward Goals: unchanged    Recommended Treatment:   See above for assessment and plan.     Risks, benefits and possible side effects of medications:   Risks, benefits, and possible side effects of medications explained to patient and patient verbalizes understanding.      Teresa Boggs M.D.   "

## 2024-07-05 LAB
CHOLEST SERPL-MCNC: 175 MG/DL
GAMMA INTERFERON BACKGROUND BLD IA-ACNC: 0.03 IU/ML
HDLC SERPL-MCNC: 53 MG/DL
LDLC SERPL CALC-MCNC: 102 MG/DL (ref 0–100)
M TB IFN-G BLD-IMP: NEGATIVE
M TB IFN-G CD4+ BCKGRND COR BLD-ACNC: 0.02 IU/ML
M TB IFN-G CD4+ BCKGRND COR BLD-ACNC: 0.04 IU/ML
MITOGEN IGNF BCKGRD COR BLD-ACNC: 9.27 IU/ML
NONHDLC SERPL-MCNC: 122 MG/DL
TRIGL SERPL-MCNC: 99 MG/DL

## 2024-07-05 PROCEDURE — 99232 SBSQ HOSP IP/OBS MODERATE 35: CPT | Performed by: STUDENT IN AN ORGANIZED HEALTH CARE EDUCATION/TRAINING PROGRAM

## 2024-07-05 PROCEDURE — 80061 LIPID PANEL: CPT | Performed by: STUDENT IN AN ORGANIZED HEALTH CARE EDUCATION/TRAINING PROGRAM

## 2024-07-05 RX ADMIN — CYANOCOBALAMIN TAB 1000 MCG 1000 MCG: 1000 TAB at 08:06

## 2024-07-05 RX ADMIN — ARIPIPRAZOLE 5 MG: 5 TABLET ORAL at 08:06

## 2024-07-05 RX ADMIN — SERTRALINE HYDROCHLORIDE 125 MG: 100 TABLET ORAL at 08:06

## 2024-07-05 RX ADMIN — FAMOTIDINE 20 MG: 20 TABLET ORAL at 08:06

## 2024-07-05 RX ADMIN — ATORVASTATIN CALCIUM 10 MG: 10 TABLET, FILM COATED ORAL at 16:06

## 2024-07-05 RX ADMIN — LEVOTHYROXINE SODIUM 25 MCG: 25 TABLET ORAL at 06:20

## 2024-07-05 RX ADMIN — FAMOTIDINE 20 MG: 20 TABLET ORAL at 17:15

## 2024-07-05 RX ADMIN — CHOLECALCIFEROL TAB 25 MCG (1000 UNIT) 2000 UNITS: 25 TAB at 08:06

## 2024-07-05 NOTE — PROGRESS NOTES
"Progress Note - Behavioral Health   Franco Roberson 39 y.o. male MRN: [id number]  Unit/Bed#: -01 Encounter: [id number]    Behavior over the last 24 hours:  unchanged.   Sleep: insomnia  Appetite: normal  Medication side effects: No  ROS: no complaints    Mental Status Evaluation:  Appearance:  Mildly disheveled, in own clothes in bed   Behavior:  cooperative   Speech:  normal pitch and normal volume   Mood:  \"Ok\"   Affect:  constricted   Thought Process:  logical   Thought Content:  No overt delusions   Perceptual Disturbances: denies   Risk Potential: Denies SI or HI when asked directly   Sensorium:  person, place, and situation   Cognition:  recent and remote memory grossly intact   Consciousness:  alert and awake    Attention: attention span appeared shorter than expected for age   Insight:  fair   Judgment: fair   Gait/Station: In bed   Motor Activity: no abnormal movements       Progress Toward Goals: Per RN report he did not sleep well last night (only approx 2 hours per patient and staff).  He states \"external factors\" impact his sleep and he reports he will ask for trazodone which had been effective to help (but last given 6/14). Reports he is awaiting placement at [facility].    Wt Readings from Last 3 Encounters:   06/30/24 131 kg (288 lb)   05/14/24 128 kg (281 lb 6.4 oz)   03/17/17 118 kg (260 lb)     Temp Readings from Last 3 Encounters:   07/06/24 (!) 97.4 °F (36.3 °C) (Temporal)   05/14/24 98 °F (36.7 °C)   03/17/17 98.8 °F (37.1 °C) (Tympanic)     BP Readings from Last 3 Encounters:   07/06/24 111/65   05/14/24 140/93   03/17/17 137/67     Pulse Readings from Last 3 Encounters:   07/06/24 65   05/14/24 72   03/17/17 94         Assessment & Plan   Principal Problem:    MDD (major depressive disorder), recurrent severe, without psychosis (HCC)  Active Problems:    Unspecified depressive disorder (HCC)    Medical clearance for psychiatric admission    Hyperlipidemia    Vitamin D deficiency    B12 " deficiency    Hypothyroidism    Rule Out Autism spectrum disorder      Recommended Treatment: Continue with group therapy, milieu therapy and occupational therapy.      Risks, benefits and possible side effects of Medications:   Risks, benefits, and possible side effects of medications explained to patient and patient verbalizes understanding.      Medications:       all current active meds have been reviewed and continue current psychiatric medications.    Labs:  Reviewed  Admission on 05/14/2024   Component Date Value    Sodium 05/15/2024 142     Potassium 05/15/2024 4.1     Chloride 05/15/2024 106     CO2 05/15/2024 28     ANION GAP 05/15/2024 8     BUN 05/15/2024 13     Creatinine 05/15/2024 0.97     Glucose 05/15/2024 89     Glucose, Fasting 05/15/2024 89     Calcium 05/15/2024 9.3     AST 05/15/2024 21     ALT 05/15/2024 19     Alkaline Phosphatase 05/15/2024 66     Total Protein 05/15/2024 7.6     Albumin 05/15/2024 4.3     Total Bilirubin 05/15/2024 0.69     eGFR 05/15/2024 97     WBC 05/15/2024 5.42     RBC 05/15/2024 5.05     Hemoglobin 05/15/2024 16.1     Hematocrit 05/15/2024 49.4 (H)     MCV 05/15/2024 98     MCH 05/15/2024 31.9     MCHC 05/15/2024 32.6     RDW 05/15/2024 12.6     MPV 05/15/2024 9.2     Platelets 05/15/2024 268     nRBC 05/15/2024 0     Segmented % 05/15/2024 53     Immature Grans % 05/15/2024 0     Lymphocytes % 05/15/2024 38     Monocytes % 05/15/2024 7     Eosinophils Relative 05/15/2024 2     Basophils Relative 05/15/2024 0     Absolute Neutrophils 05/15/2024 2.83     Absolute Immature Grans 05/15/2024 0.01     Absolute Lymphocytes 05/15/2024 2.06     Absolute Monocytes 05/15/2024 0.40     Eosinophils Absolute 05/15/2024 0.10     Basophils Absolute 05/15/2024 0.02     TSH 3RD GENERATON 05/15/2024 5.162 (H)     Vitamin B-12 05/15/2024 168 (L)     Folate 05/15/2024 >22.3     Vit D, 25-Hydroxy 05/15/2024 11.6 (L)     Cholesterol 05/15/2024 191     Triglycerides 05/15/2024 116     HDL,  Direct 05/15/2024 44     LDL Calculated 05/15/2024 124 (H)     Non-HDL-Chol (CHOL-HDL) 05/15/2024 147     Syphilis Total Antibody 05/15/2024 Non-reactive     Free T4 05/15/2024 0.60 (L)     Ventricular Rate 05/15/2024 66     Atrial Rate 05/15/2024 66     UT Interval 05/15/2024 146     QRSD Interval 05/15/2024 70     QT Interval 05/15/2024 402     QTC Interval 05/15/2024 421     P Axis 05/15/2024 39     QRS Axis 05/15/2024 48     T Wave Axis 05/15/2024 41     Ventricular Rate 05/15/2024 61     Atrial Rate 05/15/2024 61     UT Interval 05/15/2024 188     QRSD Interval 05/15/2024 92     QT Interval 05/15/2024 426     QTC Interval 05/15/2024 428     P Axis 05/15/2024 56     QRS Axis 05/15/2024 50     T Wave Axis 05/15/2024 46     Color, UA 06/26/2024 Yellow     Clarity, UA 06/26/2024 Slightly Cloudy (A)     Specific [place], UA 06/26/2024 1.020     pH, UA 06/26/2024 6.0     Leukocytes, UA 06/26/2024 Negative     Nitrite, UA 06/26/2024 Negative     Protein, UA 06/26/2024 15 (Trace) (A)     Glucose, UA 06/26/2024 Negative     Ketones, UA 06/26/2024 Negative     Bilirubin, UA 06/26/2024 Negative     Occult Blood, UA 06/26/2024 Negative     UROBILINOGEN UA 06/26/2024 Negative     RBC, UA 06/26/2024 None Seen     WBC, UA 06/26/2024 None Seen     Epithelial Cells 06/26/2024 None Seen     Bacteria, UA 06/26/2024 Moderate (A)     AMORPH URATES 06/26/2024 Moderate     MUCUS THREADS 06/26/2024 Occasional (A)     Sodium 06/27/2024 138     Potassium 06/27/2024 4.5     Chloride 06/27/2024 101     CO2 06/27/2024 30     ANION GAP 06/27/2024 7     BUN 06/27/2024 21     Creatinine 06/27/2024 1.02     Glucose 06/27/2024 85     Calcium 06/27/2024 9.7     AST 06/27/2024 20     ALT 06/27/2024 22     Alkaline Phosphatase 06/27/2024 68     Total Protein 06/27/2024 7.9     Albumin 06/27/2024 4.4     Total Bilirubin 06/27/2024 0.32     eGFR 06/27/2024 92     WBC 06/27/2024 7.01     RBC 06/27/2024 4.54     Hemoglobin 06/27/2024 14.5      Hematocrit 06/27/2024 44.4     MCV 06/27/2024 98     MCH 06/27/2024 31.9     MCHC 06/27/2024 32.7     RDW 06/27/2024 12.0     MPV 06/27/2024 8.9     Platelets 06/27/2024 249     nRBC 06/27/2024 0     Segmented % 06/27/2024 58     Immature Grans % 06/27/2024 0     Lymphocytes % 06/27/2024 31     Monocytes % 06/27/2024 9     Eosinophils Relative 06/27/2024 2     Basophils Relative 06/27/2024 0     Absolute Neutrophils 06/27/2024 4.01     Absolute Immature Grans 06/27/2024 0.02     Absolute Lymphocytes 06/27/2024 2.14     Absolute Monocytes 06/27/2024 0.65     Eosinophils Absolute 06/27/2024 0.17     Basophils Absolute 06/27/2024 0.02     Cholesterol 07/05/2024 175     Triglycerides 07/05/2024 99     HDL, Direct 07/05/2024 53     LDL Calculated 07/05/2024 102 (H)     Non-HDL-Chol (CHOL-HDL) 07/05/2024 122        Counseling / Coordination of Care  Total floor / unit time spent today 20 minutes. Greater than 50% of total time was spent with the patient and / or family counseling and / or coordination of care. A description of the counseling / coordination of care: medications, treatment progress and treatment plan reviewed with patient.

## 2024-07-05 NOTE — NURSING NOTE
Pleasant on approach. About the unit but quiet and keeping to self. Does interact with staff. Denies symptoms. Compliant with medications.

## 2024-07-05 NOTE — PLAN OF CARE
Problem: Ineffective Coping  Goal: Participates in unit activities  Description: Interventions:  - Provide therapeutic environment   - Provide required programming   - Redirect inappropriate behaviors   Outcome: Progressing     Problem: Depression  Goal: Verbalize thoughts and feelings  Description: Interventions:  - Assess and re-assess patient's level of risk   - Engage patient in 1:1 interactions, daily, for a minimum of 15 minutes   - Encourage patient to express feelings, fears, frustrations, hopes   Outcome: Progressing  Goal: Refrain from harming self  Description: Interventions:  - Monitor patient closely, per order   - Supervise medication ingestion, monitor effects and side effects   Outcome: Progressing  Goal: Refrain from isolation  Description: Interventions:  - Develop a trusting relationship   - Encourage socialization   Outcome: Progressing

## 2024-07-05 NOTE — PROGRESS NOTES
07/05/24 0851   Team Meeting   Meeting Type Daily Rounds   Team Members Present   Team Members Present Physician;Nurse;   Physician Team Member Amarilys   [facility] Member Suhas   Patient/Family Present   Patient Present No   Patient's Family Present No     Pt med/meal compliant. Visible on the unit. Discharge pending placement.

## 2024-07-05 NOTE — PROGRESS NOTES
"Progress Note - Behavioral Health   Franco Roberson 39 y.o. male MRN: [id number]  Unit/Bed#: U 344-01 Encounter: [id number]    Subjective:   Per nursing report, patient is doing fairly well.  He has been calm and cooperative.  He manage medication compliant.  No acute behavioral issues overnight. Patient is pleasant and brightens on approach, though does remain somewhat constricted during interview.  He reports that his sleep is improving.  He continues to feel safe on the unit.  He participates appropriately in groups.  He denies any SI, HI, or AVH.  He denies questions or concerns at this time.    Behavior over the last 24 hours:  unchanged  Sleep: normal  Appetite: normal  Medication side effects: No  ROS: no complaints    Mental Status Evaluation:  Appearance:  dressed appropriately and marginal hygiene   Behavior:  calm, pleasant, cooperative, and guarded   Speech:  soft and scant   Mood:  \"good\"   Affect:  constricted, slightly brighter   Thought Process:  linear and goal directed   Associations: concrete associations   Thought Content:  no overt delusions   Perceptual Disturbances: denies auditory or visual hallucinations when asked, does not appear responding to internal stimuli   Risk Potential: Suicidal ideation - None  Homicidal ideation - None  Potential for aggression - Not at present   Sensorium:  oriented to person, place, and time/date   Memory:  recent and remote memory grossly intact   Consciousness:  alert and awake   Attention/Concentration: attention span and concentration are age appropriate   Insight:  limited   Judgment: limited   Gait/Station: normal gait/station, normal balance   Motor Activity: no abnormal movements     Medications: all current active meds have been reviewed.  Current Facility-Administered Medications   Medication Dose Route Frequency Provider Last Rate    acetaminophen  650 mg Oral Q6H PRN Basilio Brown MD      acetaminophen  650 mg Oral Q4H PRN Basilio Brown MD  "     acetaminophen  975 mg Oral Q6H PRN Basilio Brown MD      aluminum-magnesium hydroxide-simethicone  30 mL Oral Q4H PRN Basilio Brown MD      ARIPiprazole  5 mg Oral Daily Basilio Panda MD      Artificial Tears  1 drop Both Eyes Q3H PRN Basilio Brown MD      atorvastatin  10 mg Oral Daily With Dinner WALLY Baptiste      benztropine  1 mg Intramuscular Q4H PRN Max 6/day Basilio Brown MD      benztropine  1 mg Oral Q4H PRN Max 6/day Basilio Brown MD      Cholecalciferol  2,000 Units Oral Daily WALLY Baptiste      cyanocobalamin  1,000 mcg Oral Daily WALLY Baptiste      Diclofenac Sodium  2 g Topical 4x Daily PRN WALLY Baptiste      hydrOXYzine HCL  50 mg Oral Q6H PRN Max 4/day Basilio Brown MD      Or    diphenhydrAMINE  50 mg Intramuscular Q6H PRN Basilio Brown MD      famotidine  20 mg Oral BID WALLY Baptiste      hydrOXYzine HCL  100 mg Oral Q6H PRN Max 4/day Basilio Brown MD      Or    LORazepam  2 mg Intramuscular Q6H PRN Basilio Brown MD      hydrOXYzine HCL  25 mg Oral Q6H PRN Max 4/day Basilio Brown MD      levothyroxine  25 mcg Oral Early Morning WALLY Baptiste      methocarbamol  500 mg Oral Q6H PRN WALLY Baptiste      OLANZapine  5 mg Oral Q4H PRN Max 3/day Basilio Brown MD      Or    OLANZapine  2.5 mg Intramuscular Q4H PRN Max 3/day Basilio Brown MD      OLANZapine  5 mg Oral Q3H PRN Max 3/day Basilio Brown MD      Or    OLANZapine  5 mg Intramuscular Q3H PRN Max 3/day Basilio Brown MD      OLANZapine  2.5 mg Oral Q4H PRN Max 6/day Basilio Brown MD      polyethylene glycol  17 g Oral Daily PRN Basilio Brown MD      propranolol  10 mg Oral Q8H PRN Basilio Brown MD      senna-docusate sodium  1 tablet Oral Daily PRN Basilio Brown MD      sertraline  125 mg Oral Daily WALLY Gomez       traZODone  50 mg Oral HS PRN Basilio Brown MD         Labs: I have personally reviewed all pertinent laboratory/tests results  Most Recent Labs:   Lab Results   Component Value Date    WBC 7.01 06/27/2024    RBC 4.54 06/27/2024    HGB 14.5 06/27/2024    HCT 44.4 06/27/2024     06/27/2024    RDW 12.0 06/27/2024    NEUTROABS 4.01 06/27/2024    SODIUM 138 06/27/2024    K 4.5 06/27/2024     06/27/2024    CO2 30 06/27/2024    BUN 21 06/27/2024    CREATININE 1.02 06/27/2024    GLUC 85 06/27/2024    CALCIUM 9.7 06/27/2024    AST 20 06/27/2024    ALT 22 06/27/2024    ALKPHOS 68 06/27/2024    TP 7.9 06/27/2024    ALB 4.4 06/27/2024    TBILI 0.32 06/27/2024    CHOLESTEROL 175 07/05/2024    HDL 53 07/05/2024    TRIG 99 07/05/2024    LDLCALC 102 (H) 07/05/2024    NONHDLC 122 07/05/2024    [id number] 5.162 (H) 05/15/2024    FREET4 0.60 (L) 05/15/2024    SYPHILISAB Non-reactive 05/15/2024         Assessment & Plan   Principal Problem:    MDD (major depressive disorder), recurrent severe, without psychosis (HCC)  Active Problems:    Unspecified depressive disorder (HCC)    Medical clearance for psychiatric admission    Hyperlipidemia    Vitamin D deficiency    B12 deficiency    Hypothyroidism    Rule Out Autism spectrum disorder    Recommended Treatment:   Continue Zoloft 125 mg daily.  Continue Abilify 5 mg daily.  Continue all other medications at current doses as above.  Encourage group therapy, milieu therapy and occupational therapy  All current active medications have been reviewed  Encourage group therapy, milieu therapy and occupational therapy  Behavioral Health checks every 7 minutes  Placement pending    ----------------------------------------    Progress Toward Goals: progressing    Risks / Benefits of Treatment:    Risks, benefits, and possible side effects of medications explained to patient and patient verbalizes understanding and agreement for treatment.    Counseling / Coordination of  Care:    Patient's progress discussed with staff in treatment team meeting.  Medications, treatment progress and treatment plan reviewed with patient.    Basilio Panda MD 07/05/24

## 2024-07-06 PROCEDURE — 99232 SBSQ HOSP IP/OBS MODERATE 35: CPT | Performed by: PSYCHIATRY & NEUROLOGY

## 2024-07-06 RX ADMIN — ATORVASTATIN CALCIUM 10 MG: 10 TABLET, FILM COATED ORAL at 17:36

## 2024-07-06 RX ADMIN — ARIPIPRAZOLE 5 MG: 5 TABLET ORAL at 09:33

## 2024-07-06 RX ADMIN — FAMOTIDINE 20 MG: 20 TABLET ORAL at 17:36

## 2024-07-06 RX ADMIN — FAMOTIDINE 20 MG: 20 TABLET ORAL at 09:33

## 2024-07-06 RX ADMIN — CHOLECALCIFEROL TAB 25 MCG (1000 UNIT) 2000 UNITS: 25 TAB at 09:33

## 2024-07-06 RX ADMIN — SERTRALINE HYDROCHLORIDE 125 MG: 100 TABLET ORAL at 09:33

## 2024-07-06 RX ADMIN — LEVOTHYROXINE SODIUM 25 MCG: 25 TABLET ORAL at 06:44

## 2024-07-06 RX ADMIN — CYANOCOBALAMIN TAB 1000 MCG 1000 MCG: 1000 TAB at 09:34

## 2024-07-06 NOTE — NURSING NOTE
Patient is visible on the milieu but quiet and isolative. He is calm, cooperative, denies SI, HI, AVH. His makes no eye contact, though his verbal responses are appropriate and oriented. He is adherent with medications. He slept quite poorly last night. He tolerates meals well with good appetite. He ambulates steadily and independently and is wearing his own attire.

## 2024-07-06 NOTE — NURSING NOTE
Patient  noted out of room on unit , patient enjoys to walk on unit and socialize with certain peers. Patient expressed  feeling sad his roommate was being discharged. Complaint with medication and routine vitals. Patient denies SI/HI/AH/VH at this time.

## 2024-07-06 NOTE — PLAN OF CARE
Problem: Ineffective Coping  Goal: Identifies ineffective coping skills  Outcome: Progressing  Goal: Identifies healthy coping skills  Outcome: Progressing  Goal: Demonstrates healthy coping skills  Outcome: Progressing  Goal: Participates in unit activities  Description: Interventions:  - Provide therapeutic environment   - Provide required programming   - Redirect inappropriate behaviors   Outcome: Progressing     Problem: Risk for Self Injury/Neglect  Goal: Treatment Goal: Remain safe during length of stay, learn and adopt new coping skills, and be free of self-injurious ideation, impulses and acts at the time of discharge  Outcome: Progressing     Problem: Depression  Goal: Treatment Goal: Demonstrate behavioral control of depressive symptoms, verbalize feelings of improved mood/affect, and adopt new coping skills prior to discharge  Outcome: Progressing  Goal: Verbalize thoughts and feelings  Description: Interventions:  - Assess and re-assess patient's level of risk   - Engage patient in 1:1 interactions, daily, for a minimum of 15 minutes   - Encourage patient to express feelings, fears, frustrations, hopes   Outcome: Progressing  Goal: Refrain from harming self  Description: Interventions:  - Monitor patient closely, per order   - Supervise medication ingestion, monitor effects and side effects   Outcome: Progressing  Goal: Refrain from isolation  Description: Interventions:  - Develop a trusting relationship   - Encourage socialization   Outcome: Progressing  Goal: Refrain from self-neglect  Outcome: Progressing  Goal: Attend and participate in unit activities, including therapeutic, recreational, and educational groups  Description: Interventions:  - Provide therapeutic and educational activities daily, encourage attendance and participation, and document same in the medical record   Outcome: Progressing  Goal: Complete daily ADLs, including personal hygiene independently, as able  Description:  Interventions:  - Observe, teach, and assist patient with ADLS  -  Monitor and promote a balance of rest/activity, with adequate nutrition and elimination   Outcome: Progressing     Problem: Anxiety  Goal: Anxiety is at manageable level  Description: Interventions:  - Assess and monitor patient's anxiety level.   - Monitor for signs and symptoms (heart palpitations, chest pain, shortness of breath, headaches, nausea, feeling jumpy, restlessness, irritable, apprehensive).   - Collaborate with interdisciplinary team and initiate plan and interventions as ordered.  - [place] patient to unit/surroundings  - Explain treatment plan  - Encourage participation in care  - Encourage verbalization of concerns/fears  - Identify coping mechanisms  - Assist in developing anxiety-reducing skills  - Administer/offer alternative therapies  - Limit or eliminate stimulants  Outcome: Progressing     Problem: DISCHARGE PLANNING - CARE MANAGEMENT  Goal: Discharge to post-acute care or home with appropriate resources  Description: INTERVENTIONS:  - Conduct assessment to determine patient/family and health care team treatment goals, and need for post-acute services based on payer coverage, community resources, and patient preferences, and barriers to discharge  - Address psychosocial, clinical, and financial barriers to discharge as identified in assessment in conjunction with the patient/family and health care team  - Arrange appropriate level of post-acute services according to patient’s   needs and preference and payer coverage in collaboration with the physician and health care team  - Communicate with and update the patient/family, physician, and health care team regarding progress on the discharge plan  - Arrange appropriate transportation to post-acute venues  Outcome: Progressing     Problem: Knowledge Deficit  Goal: Patient/family/caregiver demonstrates understanding of disease process, treatment plan, medications, and discharge  instructions  Description: Complete learning assessment and assess knowledge base.  Interventions:  - Provide teaching at level of understanding  - Provide teaching via preferred learning methods  Outcome: Progressing

## 2024-07-07 PROCEDURE — 99232 SBSQ HOSP IP/OBS MODERATE 35: CPT | Performed by: PSYCHIATRY & NEUROLOGY

## 2024-07-07 RX ADMIN — FAMOTIDINE 20 MG: 20 TABLET ORAL at 17:44

## 2024-07-07 RX ADMIN — TRAZODONE HYDROCHLORIDE 50 MG: 50 TABLET ORAL at 03:18

## 2024-07-07 RX ADMIN — CYANOCOBALAMIN TAB 1000 MCG 1000 MCG: 1000 TAB at 08:55

## 2024-07-07 RX ADMIN — ATORVASTATIN CALCIUM 10 MG: 10 TABLET, FILM COATED ORAL at 17:44

## 2024-07-07 RX ADMIN — SERTRALINE HYDROCHLORIDE 125 MG: 100 TABLET ORAL at 08:55

## 2024-07-07 RX ADMIN — LEVOTHYROXINE SODIUM 25 MCG: 25 TABLET ORAL at 06:27

## 2024-07-07 RX ADMIN — FAMOTIDINE 20 MG: 20 TABLET ORAL at 08:55

## 2024-07-07 RX ADMIN — ARIPIPRAZOLE 5 MG: 5 TABLET ORAL at 08:55

## 2024-07-07 RX ADMIN — CHOLECALCIFEROL TAB 25 MCG (1000 UNIT) 2000 UNITS: 25 TAB at 08:55

## 2024-07-07 NOTE — PROGRESS NOTES
Progress Note - Behavioral Health   Franco Roberson 39 y.o. male MRN: [id number]  Unit/Bed#: -01 Encounter: [id number]    Behavior over the last 24 hours:  unchanged.   Sleep: insomnia  Appetite: normal  Medication side effects: No  ROS: no complaints    Mental Status Evaluation:  Appearance:  casually dressed and fair eye contact   Behavior:  Cooperative, less guarded   Speech:  pressured   Mood:  normal   Affect:  constricted   Thought Process:  concrete   Thought Content:  No overt delusions   Perceptual Disturbances: None   Risk Potential: Suicidal Ideations none   Sensorium:  person, place, and situation   Cognition:  recent and remote memory grossly intact   Consciousness:  alert and awake    Attention: attention span and concentration were age appropriate   Insight:  fair   Judgment: fair   Gait/Station: In bed   Motor Activity: no abnormal movements         Progress Toward Goals: Per RN not sleeping well again, did get PRN trazodone and encouraged to take trazodone again to see if effective. He is quiet, cooperative and reports he is bothered by roommates snoring but getting used to it.     Wt Readings from Last 3 Encounters:   07/07/24 132 kg (291 lb 6.4 oz)   05/14/24 128 kg (281 lb 6.4 oz)   03/17/17 118 kg (260 lb)     Temp Readings from Last 3 Encounters:   07/07/24 97.5 °F (36.4 °C) (Temporal)   05/14/24 98 °F (36.7 °C)   03/17/17 98.8 °F (37.1 °C) (Tympanic)     BP Readings from Last 3 Encounters:   07/07/24 132/79   05/14/24 140/93   03/17/17 137/67     Pulse Readings from Last 3 Encounters:   07/07/24 70   05/14/24 72   03/17/17 94         Assessment & Plan   Principal Problem:    MDD (major depressive disorder), recurrent severe, without psychosis (HCC)  Active Problems:    Unspecified depressive disorder (HCC)    Medical clearance for psychiatric admission    Hyperlipidemia    Vitamin D deficiency    B12 deficiency    Hypothyroidism    Rule Out Autism spectrum disorder      Recommended  Pt had 6 beats of V-Tach at 0135. Paged [facility] team. Spoke with MD Zacarias , reviewed pt's electrolytes. No new orders will continue to monitor.     Treatment: Continue with group therapy, milieu therapy and occupational therapy.      Risks, benefits and possible side effects of Medications:   Risks, benefits, and possible side effects of medications explained to patient and patient verbalizes understanding.      Medications:       all current active meds have been reviewed, continue current psychiatric medications, and current meds:   Current Facility-Administered Medications   Medication Dose Route Frequency    acetaminophen (TYLENOL) tablet 650 mg  650 mg Oral Q6H PRN    acetaminophen (TYLENOL) tablet 650 mg  650 mg Oral Q4H PRN    acetaminophen (TYLENOL) tablet 975 mg  975 mg Oral Q6H PRN    aluminum-magnesium hydroxide-simethicone (MAALOX) oral suspension 30 mL  30 mL Oral Q4H PRN    ARIPiprazole (ABILIFY) tablet 5 mg  5 mg Oral Daily    Artificial Tears ophthalmic solution 1 drop  1 drop Both Eyes Q3H PRN    atorvastatin (LIPITOR) tablet 10 mg  10 mg Oral Daily With Dinner    benztropine (COGENTIN) injection 1 mg  1 mg Intramuscular Q4H PRN Max 6/day    benztropine (COGENTIN) tablet 1 mg  1 mg Oral Q4H PRN Max 6/day    Cholecalciferol (VITAMIN D3) tablet 2,000 Units  2,000 Units Oral Daily    cyanocobalamin (VITAMIN B-12) tablet 1,000 mcg  1,000 mcg Oral Daily    Diclofenac Sodium (VOLTAREN) 1 % topical gel 2 g  2 g Topical 4x Daily PRN    hydrOXYzine HCL (ATARAX) tablet 50 mg  50 mg Oral Q6H PRN Max 4/day    Or    diphenhydrAMINE (BENADRYL) injection 50 mg  50 mg Intramuscular Q6H PRN    famotidine (PEPCID) tablet 20 mg  20 mg Oral BID    hydrOXYzine HCL (ATARAX) tablet 100 mg  100 mg Oral Q6H PRN Max 4/day    Or    LORazepam (ATIVAN) injection 2 mg  2 mg Intramuscular Q6H PRN    hydrOXYzine HCL (ATARAX) tablet 25 mg  25 mg Oral Q6H PRN Max 4/day    levothyroxine tablet 25 mcg  25 mcg Oral Early Morning    methocarbamol (ROBAXIN) tablet 500 mg  500 mg Oral Q6H PRN    OLANZapine (ZyPREXA) tablet 5 mg  5 mg Oral Q4H PRN Max 3/day    Or    OLANZapine  (ZyPREXA) IM injection 2.5 mg  2.5 mg Intramuscular Q4H PRN Max 3/day    OLANZapine (ZyPREXA) tablet 5 mg  5 mg Oral Q3H PRN Max 3/day    Or    OLANZapine (ZyPREXA) IM injection 5 mg  5 mg Intramuscular Q3H PRN Max 3/day    OLANZapine (ZyPREXA) tablet 2.5 mg  2.5 mg Oral Q4H PRN Max 6/day    polyethylene glycol (MIRALAX) packet 17 g  17 g Oral Daily PRN    propranolol (INDERAL) tablet 10 mg  10 mg Oral Q8H PRN    senna-docusate sodium (SENOKOT S) 8.6-50 mg per tablet 1 tablet  1 tablet Oral Daily PRN    sertraline (ZOLOFT) tablet 125 mg  125 mg Oral Daily    traZODone (DESYREL) tablet 50 mg  50 mg Oral HS PRN   .    Labs:  Reviewed  Admission on 05/14/2024   Component Date Value    Sodium 05/15/2024 142     Potassium 05/15/2024 4.1     Chloride 05/15/2024 106     CO2 05/15/2024 28     ANION GAP 05/15/2024 8     BUN 05/15/2024 13     Creatinine 05/15/2024 0.97     Glucose 05/15/2024 89     Glucose, Fasting 05/15/2024 89     Calcium 05/15/2024 9.3     AST 05/15/2024 21     ALT 05/15/2024 19     Alkaline Phosphatase 05/15/2024 66     Total Protein 05/15/2024 7.6     Albumin 05/15/2024 4.3     Total Bilirubin 05/15/2024 0.69     eGFR 05/15/2024 97     WBC 05/15/2024 5.42     RBC 05/15/2024 5.05     Hemoglobin 05/15/2024 16.1     Hematocrit 05/15/2024 49.4 (H)     MCV 05/15/2024 98     MCH 05/15/2024 31.9     MCHC 05/15/2024 32.6     RDW 05/15/2024 12.6     MPV 05/15/2024 9.2     Platelets 05/15/2024 268     nRBC 05/15/2024 0     Segmented % 05/15/2024 53     Immature Grans % 05/15/2024 0     Lymphocytes % 05/15/2024 38     Monocytes % 05/15/2024 7     Eosinophils Relative 05/15/2024 2     Basophils Relative 05/15/2024 0     Absolute Neutrophils 05/15/2024 2.83     Absolute Immature Grans 05/15/2024 0.01     Absolute Lymphocytes 05/15/2024 2.06     Absolute Monocytes 05/15/2024 0.40     Eosinophils Absolute 05/15/2024 0.10     Basophils Absolute 05/15/2024 0.02     TSH 3RD GENERATON 05/15/2024 5.162 (H)     Vitamin B-12  05/15/2024 168 (L)     Folate 05/15/2024 >22.3     Vit D, 25-Hydroxy 05/15/2024 11.6 (L)     Cholesterol 05/15/2024 191     Triglycerides 05/15/2024 116     HDL, Direct 05/15/2024 44     LDL Calculated 05/15/2024 124 (H)     Non-HDL-Chol (CHOL-HDL) 05/15/2024 147     Syphilis Total Antibody 05/15/2024 Non-reactive     Free T4 05/15/2024 0.60 (L)     Ventricular Rate 05/15/2024 66     Atrial Rate 05/15/2024 66     OH Interval 05/15/2024 146     QRSD Interval 05/15/2024 70     QT Interval 05/15/2024 402     QTC Interval 05/15/2024 421     P Axis 05/15/2024 39     QRS Axis 05/15/2024 48     T Wave Axis 05/15/2024 41     Ventricular Rate 05/15/2024 61     Atrial Rate 05/15/2024 61     OH Interval 05/15/2024 188     QRSD Interval 05/15/2024 92     QT Interval 05/15/2024 426     QTC Interval 05/15/2024 428     P Axis 05/15/2024 56     QRS Axis 05/15/2024 50     T Wave Axis 05/15/2024 46     Color, UA 06/26/2024 Yellow     Clarity, UA 06/26/2024 Slightly Cloudy (A)     Specific [place], UA 06/26/2024 1.020     pH, UA 06/26/2024 6.0     Leukocytes, UA 06/26/2024 Negative     Nitrite, UA 06/26/2024 Negative     Protein, UA 06/26/2024 15 (Trace) (A)     Glucose, UA 06/26/2024 Negative     Ketones, UA 06/26/2024 Negative     Bilirubin, UA 06/26/2024 Negative     Occult Blood, UA 06/26/2024 Negative     UROBILINOGEN UA 06/26/2024 Negative     RBC, UA 06/26/2024 None Seen     WBC, UA 06/26/2024 None Seen     Epithelial Cells 06/26/2024 None Seen     Bacteria, UA 06/26/2024 Moderate (A)     AMORPH URATES 06/26/2024 Moderate     MUCUS THREADS 06/26/2024 Occasional (A)     Sodium 06/27/2024 138     Potassium 06/27/2024 4.5     Chloride 06/27/2024 101     CO2 06/27/2024 30     ANION GAP 06/27/2024 7     BUN 06/27/2024 21     Creatinine 06/27/2024 1.02     Glucose 06/27/2024 85     Calcium 06/27/2024 9.7     AST 06/27/2024 20     ALT 06/27/2024 22     Alkaline Phosphatase 06/27/2024 68     Total Protein 06/27/2024 7.9     Albumin  06/27/2024 4.4     Total Bilirubin 06/27/2024 0.32     eGFR 06/27/2024 92     WBC 06/27/2024 7.01     RBC 06/27/2024 4.54     Hemoglobin 06/27/2024 14.5     Hematocrit 06/27/2024 44.4     MCV 06/27/2024 98     MCH 06/27/2024 31.9     MCHC 06/27/2024 32.7     RDW 06/27/2024 12.0     MPV 06/27/2024 8.9     Platelets 06/27/2024 249     nRBC 06/27/2024 0     Segmented % 06/27/2024 58     Immature Grans % 06/27/2024 0     Lymphocytes % 06/27/2024 31     Monocytes % 06/27/2024 9     Eosinophils Relative 06/27/2024 2     Basophils Relative 06/27/2024 0     Absolute Neutrophils 06/27/2024 4.01     Absolute Immature Grans 06/27/2024 0.02     Absolute Lymphocytes 06/27/2024 2.14     Absolute Monocytes 06/27/2024 0.65     Eosinophils Absolute 06/27/2024 0.17     Basophils Absolute 06/27/2024 0.02     QFT Nil 07/03/2024 0.03     QFT TB1-NIL 07/03/2024 0.02     QFT TB2-NIL 07/03/2024 0.04     QFT Mitogen-NIL 07/03/2024 9.27     QFT Final Interpretation 07/03/2024 Negative     Cholesterol 07/05/2024 175     Triglycerides 07/05/2024 99     HDL, Direct 07/05/2024 53     LDL Calculated 07/05/2024 102 (H)     Non-HDL-Chol (CHOL-HDL) 07/05/2024 122        Counseling / Coordination of Care  Total floor / unit time spent today 20 minutes. Greater than 50% of total time was spent with the patient and / or family counseling and / or coordination of care. A description of the counseling / coordination of care: medications, treatment progress and treatment plan reviewed with patient.

## 2024-07-07 NOTE — NURSING NOTE
Patient remained visible on the unit throughout the evening. Cooperative with routine. Denied any unmet needs. No scheduled HS medications and no PRNs needed.

## 2024-07-07 NOTE — NURSING NOTE
Patient presents as withdrawn with blunted affect and poor eye contact. On approach, he is pleasant and cooperative. He rates his anxiety 4/10 on 1-10 scale and depression 3/10. He feels the anxiety and depression are manageable using coping skills. He denies SI, HI, A/V/H. He slept only 4 hours last night after taking trazodone which he reports is an improvement from his usual sleep volume. He is optimistic about placement and discharge in the coming week.

## 2024-07-07 NOTE — PLAN OF CARE
Problem: SLEEP DISTURBANCE  Goal: Will exhibit normal sleeping pattern  Description: Interventions:  -  Assess the patients sleep pattern, noting recent changes  - Administer medication as ordered  - Decrease environmental stimuli, including noise, as appropriate during the night  - Encourage the patient to actively participate in unit groups and or exercise during the day to enhance ability to achieve adequate sleep at night  - Assess the patient, in the morning, encouraging a description of sleep experience  Outcome: Not Progressing

## 2024-07-08 PROCEDURE — 99232 SBSQ HOSP IP/OBS MODERATE 35: CPT

## 2024-07-08 RX ADMIN — SERTRALINE HYDROCHLORIDE 125 MG: 100 TABLET ORAL at 08:16

## 2024-07-08 RX ADMIN — ATORVASTATIN CALCIUM 10 MG: 10 TABLET, FILM COATED ORAL at 17:14

## 2024-07-08 RX ADMIN — CYANOCOBALAMIN TAB 1000 MCG 1000 MCG: 1000 TAB at 08:16

## 2024-07-08 RX ADMIN — CHOLECALCIFEROL TAB 25 MCG (1000 UNIT) 2000 UNITS: 25 TAB at 08:16

## 2024-07-08 RX ADMIN — LEVOTHYROXINE SODIUM 25 MCG: 25 TABLET ORAL at 06:34

## 2024-07-08 RX ADMIN — ARIPIPRAZOLE 5 MG: 5 TABLET ORAL at 08:16

## 2024-07-08 RX ADMIN — FAMOTIDINE 20 MG: 20 TABLET ORAL at 17:14

## 2024-07-08 RX ADMIN — FAMOTIDINE 20 MG: 20 TABLET ORAL at 08:16

## 2024-07-08 NOTE — PROGRESS NOTES
"Progress Note - Behavioral Health   Franco Roberson 39 y.o. male MRN: [id number]  Unit/Bed#: [facility] 344-01 Encounter: [id number]      Assessment & Plan   Principal Problem:    MDD (major depressive disorder), recurrent severe, without psychosis (HCC)  Active Problems:    Unspecified depressive disorder (HCC)    Medical clearance for psychiatric admission    Hyperlipidemia    Vitamin D deficiency    B12 deficiency    Hypothyroidism    Rule Out Autism spectrum disorder      Subjective:  Patient was seen today for continuation of care, records reviewed and patient was discussed with the morning case review team.  Per staff, Franco is calm and cooperative on the unit.  He is meal and medication compliant.      Franco was seen today for psychiatric follow-up.  On assessment today, Franco was seen in the hallway away from peers.  Franco reports that his mood is \"good\" and states he \"never feels angry anymore\".  He asked to reduce Abilify today because he is feeling better.  Insight continues to be limited.  Franco was receptive to education on medication and improving symptoms.      Franco denies acute suicidal/self-harm ideation/intent/plan upon direct inquiry at this time.  Franco remains behaviorally appropriate, no agitation or aggression noted on exam or in report.  Franco also denies HI/AH/VH, and does not appear overtly manic nor does he present as internally preoccupied.  No overt delusions or paranoia are verbalized. Franco remains adherent to his current psychotropic medication regimen and denies any side effects from medications, as well as none noted on exam.    Recommended Treatment: Treatment plan and medication changes discussed and per the attending physician the plan is:    1.Continue with group therapy, milieu therapy and occupational therapy  2.Behavioral Health checks every 7 minutes  3.Continue frequent safety checks and vitals per unit protocol  4.Continue with SLIM medical management as " indicated  5.Continue with current medication regimen: Zoloft 125mg PO daily, and Abilify 5mg PO daily  6.Will review labs in the a.m.  7.Disposition Planning: Discharge planning and efforts remain ongoing, Rossy Avila     Vitals:  Vitals:    07/08/24 0720   BP: 129/67   Pulse: 69   Resp: 16   Temp: (!) 96.9 °F (36.1 °C)   SpO2: 98%       Laboratory Results:  I have personally reviewed all pertinent laboratory/tests results.  Most Recent Labs:   Lab Results   Component Value Date    WBC 7.01 06/27/2024    RBC 4.54 06/27/2024    HGB 14.5 06/27/2024    HCT 44.4 06/27/2024     06/27/2024    RDW 12.0 06/27/2024    NEUTROABS 4.01 06/27/2024    SODIUM 138 06/27/2024    K 4.5 06/27/2024     06/27/2024    CO2 30 06/27/2024    BUN 21 06/27/2024    CREATININE 1.02 06/27/2024    GLUC 85 06/27/2024    GLUF 89 05/15/2024    CALCIUM 9.7 06/27/2024    AST 20 06/27/2024    ALT 22 06/27/2024    ALKPHOS 68 06/27/2024    TP 7.9 06/27/2024    ALB 4.4 06/27/2024    TBILI 0.32 06/27/2024    CHOLESTEROL 175 07/05/2024    HDL 53 07/05/2024    TRIG 99 07/05/2024    LDLCALC 102 (H) 07/05/2024    NONHDLC 122 07/05/2024    [id number] 5.162 (H) 05/15/2024    FREET4 0.60 (L) 05/15/2024       Psychiatric Review of Systems:  Behavior over the last 24 hours:  continues to slowly improve.   Sleep: adequate  Appetite: adequate  Medication side effects: denies and none observed   ROS: no complaints, denies shortness of breath or chest pain and all other systems are negative for acute changes    Mental Status Evaluation:    Appearance:  disheveled, marginal hygiene   Behavior:  cooperative, calm   Speech:  normal rate and volume, fluent, clear   Mood:  euthymic   Affect:  constricted   Thought Process:  goal directed, linear   Associations: concrete associations   Thought Content:  no overt delusions   Perceptual Disturbances: denies auditory or visual hallucinations when asked, does not appear responding to internal stimuli   Risk  Potential: Suicidal ideation - None  Homicidal ideation - None  Potential for aggression - Not at present   Sensorium:  oriented to person, place, and time/date   Memory:  recent memory intact   Consciousness:  alert and awake   Attention/Concentration: attention span and concentration are age appropriate   Insight:  limited   Judgment: limited   Gait/Station: normal gait/station   Motor Activity: no abnormal movements     Progress Toward Goals:   This patient is stable and appears to be at baseline, however, requires ongoing hospitalization due to continued poor insight, judgement, and coping that poses a risk to patient in an unsupervised setting. Without medication management in place, patient is likely to decompensate rapidly and become a risk of danger to self or others.      Risk of Harm to Self:   Nursing Suicide Risk Assessment Last 24 hours: C-SSRS Risk (Since Last Contact)  Calculated C-SSRS Risk Score (Since Last Contact): No Risk Indicated  Current Specific Risk Factors include: mental illness diagnosis  Protective Factors: no current suicidal ideation, ability to communicate with staff on the unit, able to contract for safety on the unit  Based on today's assessment, Franco presents the following risk of harm to self: low    Risk of Harm to Others:  Nursing Homicide Risk Assessment: Violence Risk to Others: Denies within past 6 months  Current Specific Risk Factors include: social difficulties  Protective Factors: no current homicidal ideation, compliant with medications on the unit as ordered, compliant with unit milieu, follows staff redirection  Based on today's assessment, Franco presents the following risk of harm to others: low    The following interventions are recommended: behavioral checks every 7 minutes, continued hospitalization on locked unit        Behavioral Health Medications: all current active meds have been reviewed and continue current psychiatric medications.  Current  Facility-Administered Medications   Medication Dose Route Frequency Provider Last Rate    acetaminophen  650 mg Oral Q6H PRN Basilio Brown MD      acetaminophen  650 mg Oral Q4H PRN Basilio Brown MD      acetaminophen  975 mg Oral Q6H PRN Basilio Brown MD      aluminum-magnesium hydroxide-simethicone  30 mL Oral Q4H PRN Basilio Brown MD      ARIPiprazole  5 mg Oral Daily Basilio Panda MD      Artificial Tears  1 drop Both Eyes Q3H PRN Basilio Brown MD      atorvastatin  10 mg Oral Daily With Dinner WALLY Baptiste      benztropine  1 mg Intramuscular Q4H PRN Max 6/day Basilio Brown MD      benztropine  1 mg Oral Q4H PRN Max 6/day Basilio Brown MD      Cholecalciferol  2,000 Units Oral Daily WALLY Baptiste      cyanocobalamin  1,000 mcg Oral Daily WALLY Baptiste      Diclofenac Sodium  2 g Topical 4x Daily PRN WALLY Baptiste      hydrOXYzine HCL  50 mg Oral Q6H PRN Max 4/day Basilio Brown MD      Or    diphenhydrAMINE  50 mg Intramuscular Q6H PRN Basilio Brown MD      famotidine  20 mg Oral BID WALLY Baptiste      hydrOXYzine HCL  100 mg Oral Q6H PRN Max 4/day Basilio Brown MD      Or    LORazepam  2 mg Intramuscular Q6H PRN Basilio Brown MD      hydrOXYzine HCL  25 mg Oral Q6H PRN Max 4/day Basilio Brown MD      levothyroxine  25 mcg Oral Early Morning WALLY Baptiste      methocarbamol  500 mg Oral Q6H PRN WALLY Baptiste      OLANZapine  5 mg Oral Q4H PRN Max 3/day Basilio Brown MD      Or    OLANZapine  2.5 mg Intramuscular Q4H PRN Max 3/day Basilio Brown MD      OLANZapine  5 mg Oral Q3H PRN Max 3/day Basilio Brown MD      Or    OLANZapine  5 mg Intramuscular Q3H PRN Max 3/day Basilio Brown MD      OLANZapine  2.5 mg Oral Q4H PRN Max 6/day Basilio Brown MD      polyethylene glycol  17 g Oral Daily PRN Basilio  Jose Boss MD      propranolol  10 mg Oral Q8H PRN Basilio Brown MD      senna-docusate sodium  1 tablet Oral Daily PRN Basilio Brown MD      sertraline  125 mg Oral Daily WALLY Gomez      traZODone  50 mg Oral HS PRN Basilio Brown MD         Risks / Benefits of Treatment:  Risks, benefits, and possible side effects of medications explained to patient and patient verbalizes understanding and agreement for treatment.    Counseling / Coordination of Care:  Patient's progress reviewed with nursing staff.  Medications, treatment progress and treatment plan reviewed with patient.  Supportive counseling provided to the patient.    Total floor/unit time spent today 25 minutes. Greater than 50% of total time was spent with the patient and / or family counseling and / or coordination of care. A description of the counseling / coordination of care: medication education, treatment plan, supportive therapy.

## 2024-07-08 NOTE — NURSING NOTE
Patient remained visible on the unit throughout the evening. Pleasant and cooperative with routine. Denied any unmet needs.No scheduled HS medications.

## 2024-07-08 NOTE — CASE MANAGEMENT
CM obtained completed TB results and faxed them along with referral and requested paperwork to [facility].     F [phone]

## 2024-07-08 NOTE — PLAN OF CARE
Pt attends groups fairly regularly and has been making progress in his capacity to express himself.

## 2024-07-08 NOTE — PROGRESS NOTES
Patient has remained visible on the unit. Patient has been pleasant and cooperative. Patient has been withdrawn, poor eye contact, with a blunted affect.       Patient rates anxiety 4/10 on 1-10 scale and depression 3/10.     Patient reports anxiety and depressive symptoms are manageable.   Patient denies SI, HI, A/V/H.     Patient remains optimistic about placement and discharge in the coming week.      Discharge Date:TBD     24 0919   Team Meeting   Meeting Type Daily Rounds   Team Members Present   Team Members Present Physician;Nurse;;Other (Discipline and Name)   Physician Team Member Keeley/ Maxine//Alex/[place]   Nursing Team Member Price/ Nursing Student   Care Management Team Member Toby/ [place]   Other (Discipline and Name) Group Facilitator   Patient/Family Present   Patient Present No   Patient's Family Present No

## 2024-07-09 LAB
BACTERIA UR QL AUTO: NORMAL /HPF
BILIRUB UR QL STRIP: NEGATIVE
CLARITY UR: CLEAR
COLOR UR: NORMAL
GLUCOSE UR STRIP-MCNC: NEGATIVE MG/DL
HGB UR QL STRIP.AUTO: NEGATIVE
KETONES UR STRIP-MCNC: NEGATIVE MG/DL
LEUKOCYTE ESTERASE UR QL STRIP: NEGATIVE
NITRITE UR QL STRIP: NEGATIVE
NON-SQ EPI CELLS URNS QL MICRO: NORMAL /HPF
PH UR STRIP.AUTO: 6 [PH]
PROT UR STRIP-MCNC: NEGATIVE MG/DL
RBC #/AREA URNS AUTO: NORMAL /HPF
SP GR UR STRIP.AUTO: 1.01 (ref 1–1.04)
UROBILINOGEN UA: NEGATIVE MG/DL
WBC #/AREA URNS AUTO: NORMAL /HPF

## 2024-07-09 PROCEDURE — 87086 URINE CULTURE/COLONY COUNT: CPT | Performed by: NURSE PRACTITIONER

## 2024-07-09 PROCEDURE — 81001 URINALYSIS AUTO W/SCOPE: CPT | Performed by: NURSE PRACTITIONER

## 2024-07-09 PROCEDURE — 99232 SBSQ HOSP IP/OBS MODERATE 35: CPT

## 2024-07-09 RX ADMIN — FAMOTIDINE 20 MG: 20 TABLET ORAL at 17:13

## 2024-07-09 RX ADMIN — CYANOCOBALAMIN TAB 1000 MCG 1000 MCG: 1000 TAB at 08:20

## 2024-07-09 RX ADMIN — CHOLECALCIFEROL TAB 25 MCG (1000 UNIT) 2000 UNITS: 25 TAB at 08:20

## 2024-07-09 RX ADMIN — ATORVASTATIN CALCIUM 10 MG: 10 TABLET, FILM COATED ORAL at 17:13

## 2024-07-09 RX ADMIN — SERTRALINE HYDROCHLORIDE 125 MG: 100 TABLET ORAL at 08:20

## 2024-07-09 RX ADMIN — LEVOTHYROXINE SODIUM 25 MCG: 25 TABLET ORAL at 06:33

## 2024-07-09 RX ADMIN — FAMOTIDINE 20 MG: 20 TABLET ORAL at 08:20

## 2024-07-09 RX ADMIN — ARIPIPRAZOLE 5 MG: 5 TABLET ORAL at 08:20

## 2024-07-09 NOTE — PLAN OF CARE
Problem: Ineffective Coping  Goal: Identifies ineffective coping skills  Outcome: Progressing  Goal: Identifies healthy coping skills  Outcome: Progressing  Goal: Demonstrates healthy coping skills  Outcome: Progressing  Goal: Participates in unit activities  Description: Interventions:  - Provide therapeutic environment   - Provide required programming   - Redirect inappropriate behaviors   Outcome: Progressing     Problem: Risk for Self Injury/Neglect  Goal: Treatment Goal: Remain safe during length of stay, learn and adopt new coping skills, and be free of self-injurious ideation, impulses and acts at the time of discharge  Outcome: Progressing     Problem: Depression  Goal: Treatment Goal: Demonstrate behavioral control of depressive symptoms, verbalize feelings of improved mood/affect, and adopt new coping skills prior to discharge  Outcome: Progressing  Goal: Verbalize thoughts and feelings  Description: Interventions:  - Assess and re-assess patient's level of risk   - Engage patient in 1:1 interactions, daily, for a minimum of 15 minutes   - Encourage patient to express feelings, fears, frustrations, hopes   Outcome: Progressing  Goal: Refrain from harming self  Description: Interventions:  - Monitor patient closely, per order   - Supervise medication ingestion, monitor effects and side effects   Outcome: Progressing  Goal: Refrain from isolation  Description: Interventions:  - Develop a trusting relationship   - Encourage socialization   Outcome: Progressing  Goal: Refrain from self-neglect  Outcome: Progressing  Goal: Attend and participate in unit activities, including therapeutic, recreational, and educational groups  Description: Interventions:  - Provide therapeutic and educational activities daily, encourage attendance and participation, and document same in the medical record   Outcome: Progressing  Goal: Complete daily ADLs, including personal hygiene independently, as able  Description:  Interventions:  - Observe, teach, and assist patient with ADLS  -  Monitor and promote a balance of rest/activity, with adequate nutrition and elimination   Outcome: Progressing     Problem: Anxiety  Goal: Anxiety is at manageable level  Description: Interventions:  - Assess and monitor patient's anxiety level.   - Monitor for signs and symptoms (heart palpitations, chest pain, shortness of breath, headaches, nausea, feeling jumpy, restlessness, irritable, apprehensive).   - Collaborate with interdisciplinary team and initiate plan and interventions as ordered.  - [place] patient to unit/surroundings  - Explain treatment plan  - Encourage participation in care  - Encourage verbalization of concerns/fears  - Identify coping mechanisms  - Assist in developing anxiety-reducing skills  - Administer/offer alternative therapies  - Limit or eliminate stimulants  Outcome: Progressing     Problem: DISCHARGE PLANNING - CARE MANAGEMENT  Goal: Discharge to post-acute care or home with appropriate resources  Description: INTERVENTIONS:  - Conduct assessment to determine patient/family and health care team treatment goals, and need for post-acute services based on payer coverage, community resources, and patient preferences, and barriers to discharge  - Address psychosocial, clinical, and financial barriers to discharge as identified in assessment in conjunction with the patient/family and health care team  - Arrange appropriate level of post-acute services according to patient’s   needs and preference and payer coverage in collaboration with the physician and health care team  - Communicate with and update the patient/family, physician, and health care team regarding progress on the discharge plan  - Arrange appropriate transportation to post-acute venues  Outcome: Progressing     Problem: Knowledge Deficit  Goal: Patient/family/caregiver demonstrates understanding of disease process, treatment plan, medications, and discharge  instructions  Description: Complete learning assessment and assess knowledge base.  Interventions:  - Provide teaching at level of understanding  - Provide teaching via preferred learning methods  Outcome: Progressing     Problem: SLEEP DISTURBANCE  Goal: Will exhibit normal sleeping pattern  Description: Interventions:  -  Assess the patients sleep pattern, noting recent changes  - Administer medication as ordered  - Decrease environmental stimuli, including noise, as appropriate during the night  - Encourage the patient to actively participate in unit groups and or exercise during the day to enhance ability to achieve adequate sleep at night  - Assess the patient, in the morning, encouraging a description of sleep experience  Outcome: Progressing

## 2024-07-09 NOTE — NURSING NOTE
Patient has been seclusive to his room as of this time. Only out for breakfast and morning medications. Pleasant, calm and cooperative. Denies symptoms. Waiting placement.

## 2024-07-09 NOTE — PROGRESS NOTES
"Progress Note - Behavioral Health   Franco Roberson 39 y.o. male MRN: [id number]  Unit/Bed#: [facility] 344-01 Encounter: [id number]      Assessment & Plan   Principal Problem:    MDD (major depressive disorder), recurrent severe, without psychosis (HCC)  Active Problems:    Unspecified depressive disorder (HCC)    Medical clearance for psychiatric admission    Hyperlipidemia    Vitamin D deficiency    B12 deficiency    Hypothyroidism    Rule Out Autism spectrum disorder      Subjective:  Patient was seen today for continuation of care, records reviewed and patient was discussed with the morning case review team.  Per staff, Franco is calm and cooperative on the unit, he is awaiting placement.    Franco was seen today for psychiatric follow-up.  On assessment today, Franco was seen in the hallway away from peers.  Franco reports that his mood is \"good\" today.  He has some intermittent anxiety about placement and housing after discharge.  He continues to sleep well at night and has adequate daytime energy.  He was observed in the group room prior to the interview interacting with some peers appropriately.      Franco denies acute suicidal/self-harm ideation/intent/plan upon direct inquiry at this time.  Franco remains behaviorally appropriate, no agitation or aggression noted on exam or in report.  Franco also denies HI/AH/VH, and does not appear overtly manic nor does he present as internally preoccupied.  No overt delusions or paranoia are verbalized. Franco remains adherent to his current psychotropic medication regimen and denies any side effects from medications, as well as none noted on exam.    Recommended Treatment: Treatment plan and medication changes discussed and per the attending physician the plan is:    1.Continue with group therapy, milieu therapy and occupational therapy  2.Behavioral Health checks every 7 minutes  3.Continue frequent safety checks and vitals per unit protocol  4.Continue with SLIM medical " management as indicated  5.Continue with current medication regimen: Zoloft 125mg PO daily, and Abilify 5mg PO daily  6.Will review labs in the a.m.  7.Disposition Planning: Discharge planning and efforts remain ongoing, Rossy Avila     Vitals:  Vitals:    07/09/24 0701   BP: 110/70   Pulse: 73   Resp: 16   Temp: (!) 97.1 °F (36.2 °C)   SpO2: 98%       Laboratory Results:  I have personally reviewed all pertinent laboratory/tests results.  Most Recent Labs:   Lab Results   Component Value Date    WBC 7.01 06/27/2024    RBC 4.54 06/27/2024    HGB 14.5 06/27/2024    HCT 44.4 06/27/2024     06/27/2024    RDW 12.0 06/27/2024    NEUTROABS 4.01 06/27/2024    SODIUM 138 06/27/2024    K 4.5 06/27/2024     06/27/2024    CO2 30 06/27/2024    BUN 21 06/27/2024    CREATININE 1.02 06/27/2024    GLUC 85 06/27/2024    GLUF 89 05/15/2024    CALCIUM 9.7 06/27/2024    AST 20 06/27/2024    ALT 22 06/27/2024    ALKPHOS 68 06/27/2024    TP 7.9 06/27/2024    ALB 4.4 06/27/2024    TBILI 0.32 06/27/2024    CHOLESTEROL 175 07/05/2024    HDL 53 07/05/2024    TRIG 99 07/05/2024    LDLCALC 102 (H) 07/05/2024    NONHDLC 122 07/05/2024    [id number] 5.162 (H) 05/15/2024    FREET4 0.60 (L) 05/15/2024       Psychiatric Review of Systems:  Behavior over the last 24 hours:  continues to slowly improve.   Sleep: adequate  Appetite: adequate  Medication side effects: denies and none observed   ROS: no complaints, denies shortness of breath or chest pain and all other systems are negative for acute changes    Mental Status Evaluation:    Appearance:  disheveled, marginal hygiene   Behavior:  pleasant, cooperative, calm   Speech:  normal rate and volume, fluent, clear   Mood:  euthymic   Affect:  constricted, reactive at times, laughs appropriately   Thought Process:  goal directed, linear   Associations: concrete associations   Thought Content:  no overt delusions   Perceptual Disturbances: denies auditory or visual hallucinations when  asked, does not appear responding to internal stimuli   Risk Potential: Suicidal ideation - None  Homicidal ideation - None  Potential for aggression - Not at present   Sensorium:  oriented to person, place, and time/date   Memory:  recent memory intact   Consciousness:  alert and awake   Attention/Concentration: attention span and concentration are age appropriate   Insight:  limited   Judgment: limited   Gait/Station: normal gait/station   Motor Activity: no abnormal movements     Progress Toward Goals:   This patient is stable and appears to be at baseline, however, requires ongoing hospitalization due to continued poor insight, judgement, and coping that poses a risk to patient in an unsupervised setting. Without medication management in place, patient is likely to decompensate rapidly and become a risk of danger to self or others.      Risk of Harm to Self:   Nursing Suicide Risk Assessment Last 24 hours: C-SSRS Risk (Since Last Contact)  Calculated C-SSRS Risk Score (Since Last Contact): No Risk Indicated  Current Specific Risk Factors include: mental illness diagnosis  Protective Factors: no current suicidal ideation, ability to communicate with staff on the unit, able to contract for safety on the unit  Based on today's assessment, Franco presents the following risk of harm to self: low    Risk of Harm to Others:  Nursing Homicide Risk Assessment: Violence Risk to Others: Denies within past 6 months  Current Specific Risk Factors include: social difficulties  Protective Factors: no current homicidal ideation, compliant with medications on the unit as ordered, compliant with unit milieu, follows staff redirection  Based on today's assessmentFarnco presents the following risk of harm to others: low    The following interventions are recommended: behavioral checks every 7 minutes, continued hospitalization on locked unit        Behavioral Health Medications: all current active meds have been reviewed and  continue current psychiatric medications.  Current Facility-Administered Medications   Medication Dose Route Frequency Provider Last Rate    acetaminophen  650 mg Oral Q6H PRN Basilio Brown MD      acetaminophen  650 mg Oral Q4H PRN Basilio Brown MD      acetaminophen  975 mg Oral Q6H PRN Basilio Brown MD      aluminum-magnesium hydroxide-simethicone  30 mL Oral Q4H PRN Basilio Brown MD      ARIPiprazole  5 mg Oral Daily Basilio Panda MD      Artificial Tears  1 drop Both Eyes Q3H PRN Basilio Brown MD      atorvastatin  10 mg Oral Daily With Dinner WALLY Baptiste      benztropine  1 mg Intramuscular Q4H PRN Max 6/day Basilio Brown MD      benztropine  1 mg Oral Q4H PRN Max 6/day Basilio Brown MD      Cholecalciferol  2,000 Units Oral Daily WALLY Baptiste      cyanocobalamin  1,000 mcg Oral Daily WALLY Baptiste      Diclofenac Sodium  2 g Topical 4x Daily PRN WALLY Baptiste      hydrOXYzine HCL  50 mg Oral Q6H PRN Max 4/day Basilio Brown MD      Or    diphenhydrAMINE  50 mg Intramuscular Q6H PRN Basilio Brown MD      famotidine  20 mg Oral BID WALLY Baptiste      hydrOXYzine HCL  100 mg Oral Q6H PRN Max 4/day Basilio Brown MD      Or    LORazepam  2 mg Intramuscular Q6H PRN Basilio Brown MD      hydrOXYzine HCL  25 mg Oral Q6H PRN Max 4/day Basilio Brown MD      levothyroxine  25 mcg Oral Early Morning WALLY Baptiste      methocarbamol  500 mg Oral Q6H PRN WALLY Baptiste      OLANZapine  5 mg Oral Q4H PRN Max 3/day Basilio Brown MD      Or    OLANZapine  2.5 mg Intramuscular Q4H PRN Max 3/day Basilio Brown MD      OLANZapine  5 mg Oral Q3H PRN Max 3/day Basilio Brown MD      Or    OLANZapine  5 mg Intramuscular Q3H PRN Max 3/day Basilio Brown MD      OLANZapine  2.5 mg Oral Q4H PRN Max 6/day Basilio Brown MD       polyethylene glycol  17 g Oral Daily PRN Basilio Brown MD      propranolol  10 mg Oral Q8H PRN Basilio Brown MD      senna-docusate sodium  1 tablet Oral Daily PRN Basilio Brown MD      sertraline  125 mg Oral Daily WALLY Gomez      traZODone  50 mg Oral HS PRN Basilio Brown MD         Risks / Benefits of Treatment:  Risks, benefits, and possible side effects of medications explained to patient and patient verbalizes understanding and agreement for treatment.    Counseling / Coordination of Care:  Patient's progress reviewed with nursing staff.  Medications, treatment progress and treatment plan reviewed with patient.  Supportive counseling provided to the patient.    Total floor/unit time spent today 25 minutes. Greater than 50% of total time was spent with the patient and / or family counseling and / or coordination of care. A description of the counseling / coordination of care: medication education, treatment plan, supportive therapy.

## 2024-07-09 NOTE — NURSING NOTE
Patient approached writer reporting that he felt like he had a UTI. States that he is having urgency and frequency. Medical provider Sanaz Portillo made aware. UA and Urine Culture ordered. Patient provided with urine specimen cup. Have not yet obtained.

## 2024-07-09 NOTE — PROGRESS NOTES
07/09/24 0841   Team Meeting   Meeting Type Daily Rounds   Initial Conference Date 07/09/24   Team Members Present   Team Members Present Physician;Nurse;;Occupational Therapist   Physician Team Member Alex/Gisela/Beatriz/Jacob   Nursing Team Member Roselyn   Care Management Team Member Gagan PAPPAS Team Member Luna   Patient/Family Present   Patient Present No   Patient's Family Present No     Patient information faxed to [facility] after Tb results came back. Patient is waiting on placement. Discharge is pending. Calm and cooperative with care.

## 2024-07-10 LAB
ALBUMIN SERPL BCG-MCNC: 4.4 G/DL (ref 3.5–5)
ALP SERPL-CCNC: 66 U/L (ref 34–104)
ALT SERPL W P-5'-P-CCNC: 36 U/L (ref 7–52)
ANION GAP SERPL CALCULATED.3IONS-SCNC: 9 MMOL/L (ref 4–13)
AST SERPL W P-5'-P-CCNC: 28 U/L (ref 13–39)
BACTERIA UR CULT: NORMAL
BILIRUB SERPL-MCNC: 0.55 MG/DL (ref 0.2–1)
BUN SERPL-MCNC: 16 MG/DL (ref 5–25)
CALCIUM SERPL-MCNC: 9.3 MG/DL (ref 8.4–10.2)
CHLORIDE SERPL-SCNC: 103 MMOL/L (ref 96–108)
CO2 SERPL-SCNC: 27 MMOL/L (ref 21–32)
CREAT SERPL-MCNC: 0.92 MG/DL (ref 0.6–1.3)
GFR SERPL CREATININE-BSD FRML MDRD: 104 ML/MIN/1.73SQ M
GLUCOSE P FAST SERPL-MCNC: 87 MG/DL (ref 65–99)
GLUCOSE SERPL-MCNC: 87 MG/DL (ref 65–140)
POTASSIUM SERPL-SCNC: 3.8 MMOL/L (ref 3.5–5.3)
PROT SERPL-MCNC: 7.7 G/DL (ref 6.4–8.4)
SODIUM SERPL-SCNC: 139 MMOL/L (ref 135–147)
T4 FREE SERPL-MCNC: 0.54 NG/DL (ref 0.61–1.12)
TSH SERPL DL<=0.05 MIU/L-ACNC: 5.71 UIU/ML (ref 0.45–4.5)

## 2024-07-10 PROCEDURE — 80053 COMPREHEN METABOLIC PANEL: CPT | Performed by: NURSE PRACTITIONER

## 2024-07-10 PROCEDURE — 99232 SBSQ HOSP IP/OBS MODERATE 35: CPT

## 2024-07-10 PROCEDURE — 84443 ASSAY THYROID STIM HORMONE: CPT | Performed by: NURSE PRACTITIONER

## 2024-07-10 PROCEDURE — 84439 ASSAY OF FREE THYROXINE: CPT | Performed by: NURSE PRACTITIONER

## 2024-07-10 RX ORDER — LEVOTHYROXINE SODIUM 75 UG/1
37.5 TABLET ORAL
Status: DISPENSED | OUTPATIENT
Start: 2024-07-11

## 2024-07-10 RX ADMIN — ARIPIPRAZOLE 5 MG: 5 TABLET ORAL at 08:24

## 2024-07-10 RX ADMIN — CHOLECALCIFEROL TAB 25 MCG (1000 UNIT) 2000 UNITS: 25 TAB at 08:24

## 2024-07-10 RX ADMIN — ATORVASTATIN CALCIUM 10 MG: 10 TABLET, FILM COATED ORAL at 17:47

## 2024-07-10 RX ADMIN — SERTRALINE HYDROCHLORIDE 125 MG: 100 TABLET ORAL at 08:24

## 2024-07-10 RX ADMIN — LEVOTHYROXINE SODIUM 25 MCG: 25 TABLET ORAL at 06:12

## 2024-07-10 RX ADMIN — FAMOTIDINE 20 MG: 20 TABLET ORAL at 17:47

## 2024-07-10 RX ADMIN — CYANOCOBALAMIN TAB 1000 MCG 1000 MCG: 1000 TAB at 08:24

## 2024-07-10 RX ADMIN — FAMOTIDINE 20 MG: 20 TABLET ORAL at 08:24

## 2024-07-10 NOTE — NURSING NOTE
Pt visible, somewhat social, but most pacing alone in the hallways.  Pt is pleasant and cooperative, denies HI/SI/AVH and compliant with medications.  Pt present for dinner and dressed in personal attire. No unmet needs reported at this time.

## 2024-07-10 NOTE — PROGRESS NOTES
07/10/24 0813   Team Meeting   Meeting Type Daily Rounds   Initial Conference Date 07/10/24   Team Members Present   Team Members Present Physician;Nurse;;Occupational Therapist   Physician Team Member Alex/Gisela/Beatriz/Jacob   Nursing Team Member Roselyn   Care Management Team Member Gagan PAPPAS Team Member Luna   Patient/Family Present   Patient Present No   Patient's Family Present No     Patient is denying all symptoms and continues to wait for placement. He feels as though he has UTI symptoms but medical did not find that to be the case. Patient is cooperative with care. Discharge is pending.

## 2024-07-10 NOTE — PROGRESS NOTES
"Progress Note - Behavioral Health   Franco Roberson 39 y.o. male MRN: [id number]  Unit/Bed#: [facility] 344-01 Encounter: [id number]      Assessment & Plan   Principal Problem:    MDD (major depressive disorder), recurrent severe, without psychosis (HCC)  Active Problems:    Unspecified depressive disorder (HCC)    Medical clearance for psychiatric admission    Hyperlipidemia    Vitamin D deficiency    B12 deficiency    Hypothyroidism    Rule Out Autism spectrum disorder      Subjective:  Patient was seen today for continuation of care, records reviewed and patient was discussed with the morning case review team.  Per staff, Franco is calm and cooperative on the unit.  He is attending groups and participating more.  Reported some UTI symptoms yesterday but labs normal.  Medical started him on synthroid.    Franco was seen today for psychiatric follow-up.  On assessment today, Franco was seen in the hallway away from peers.  Franco reported some anxiety about labs drawn this morning, we discussed labs which were largely WNL but for his thyroid.  He was receptive to education.  He was concerned these labs might prolong placement with [facility], reassurance provided.  Otherwise he reports his mood is \"good\".  He has been sleeping well has has adequate daytime energy.     Franco denies acute suicidal/self-harm ideation/intent/plan upon direct inquiry at this time.  Franco remains behaviorally appropriate, no agitation or aggression noted on exam or in report.  Franco also denies HI/AH/VH, and does not appear overtly manic nor does he present as internally preoccupied.  No overt delusions or paranoia are verbalized. Franco remains adherent to his current psychotropic medication regimen and denies any side effects from medications, as well as none noted on exam.    Recommended Treatment: Treatment plan and medication changes discussed and per the attending physician the plan is:    1.Continue with group therapy, milieu therapy and " occupational therapy  2.Behavioral Health checks every 7 minutes  3.Continue frequent safety checks and vitals per unit protocol  4.Continue with [facility] medical management as indicated  5.Continue with current medication regimen: Zoloft 125mg PO daily, and Abilify 5mg PO daily  6.Will review labs in the a.m.  7.Disposition Planning: Discharge planning and efforts remain ongoing, Rossy Avila     Vitals:  Vitals:    07/10/24 0708   BP: 120/70   Pulse: 68   Resp: 16   Temp: (!) 97.4 °F (36.3 °C)   SpO2: 91%       Laboratory Results:  I have personally reviewed all pertinent laboratory/tests results.  Most Recent Labs:   Lab Results   Component Value Date    WBC 7.01 06/27/2024    RBC 4.54 06/27/2024    HGB 14.5 06/27/2024    HCT 44.4 06/27/2024     06/27/2024    RDW 12.0 06/27/2024    NEUTROABS 4.01 06/27/2024    SODIUM 139 07/10/2024    K 3.8 07/10/2024     07/10/2024    CO2 27 07/10/2024    BUN 16 07/10/2024    CREATININE 0.92 07/10/2024    GLUC 87 07/10/2024    GLUF 87 07/10/2024    CALCIUM 9.3 07/10/2024    AST 28 07/10/2024    ALT 36 07/10/2024    ALKPHOS 66 07/10/2024    TP 7.7 07/10/2024    ALB 4.4 07/10/2024    TBILI 0.55 07/10/2024    CHOLESTEROL 175 07/05/2024    HDL 53 07/05/2024    TRIG 99 07/05/2024    LDLCALC 102 (H) 07/05/2024    NONHDLC 122 07/05/2024    UCH7YVTBNIRY 5.705 (H) 07/10/2024    FREET4 0.60 (L) 05/15/2024       Psychiatric Review of Systems:  Behavior over the last 24 hours:  continues to slowly improve.   Sleep: adequate  Appetite: adequate  Medication side effects: denies and none observed   ROS: no complaints, denies shortness of breath or chest pain and all other systems are negative for acute changes    Mental Status Evaluation:    Appearance:  disheveled, marginal hygiene, casual shirt and hospital pants    Behavior:  pleasant, cooperative, calm, fair eye contact   Speech:  normal rate and volume, fluent, clear   Mood:  euthymic   Affect:  constricted   Thought Process:   goal directed, linear   Associations: concrete associations   Thought Content:  no overt delusions   Perceptual Disturbances: denies auditory or visual hallucinations when asked, does not appear responding to internal stimuli   Risk Potential: Suicidal ideation - None  Homicidal ideation - None  Potential for aggression - Not at present   Sensorium:  oriented to person, place, and time/date   Memory:  recent memory intact   Consciousness:  alert and awake   Attention/Concentration: attention span and concentration are age appropriate   Insight:  limited   Judgment: limited   Gait/Station: normal gait/station   Motor Activity: no abnormal movements     Progress Toward Goals:   This patient is stable and appears to be at baseline, however, requires ongoing hospitalization due to continued poor insight, judgement, and coping that poses a risk to patient in an unsupervised setting. Without medication management in place, patient is likely to decompensate rapidly and become a risk of danger to self or others.      Risk of Harm to Self:   Nursing Suicide Risk Assessment Last 24 hours: C-SSRS Risk (Since Last Contact)  Calculated C-SSRS Risk Score (Since Last Contact): No Risk Indicated  Current Specific Risk Factors include: mental illness diagnosis  Protective Factors: no current suicidal ideation, ability to communicate with staff on the unit, able to contract for safety on the unit  Based on today's assessment, Franco presents the following risk of harm to self: low    Risk of Harm to Others:  Nursing Homicide Risk Assessment: Violence Risk to Others: Denies within past 6 months  Current Specific Risk Factors include: social difficulties  Protective Factors: no current homicidal ideation, compliant with medications on the unit as ordered, compliant with unit milieu, follows staff redirection  Based on today's assessmentFranco presents the following risk of harm to others: low    The following interventions are  recommended: behavioral checks every 7 minutes, continued hospitalization on locked unit        Behavioral Health Medications: all current active meds have been reviewed and continue current psychiatric medications.  Current Facility-Administered Medications   Medication Dose Route Frequency Provider Last Rate    acetaminophen  650 mg Oral Q6H PRN Basilio Brown MD      acetaminophen  650 mg Oral Q4H PRN Basilio Brown MD      acetaminophen  975 mg Oral Q6H PRN Basilio Brown MD      aluminum-magnesium hydroxide-simethicone  30 mL Oral Q4H PRN Basilio Brown MD      ARIPiprazole  5 mg Oral Daily Basilio Panda MD      Artificial Tears  1 drop Both Eyes Q3H PRN Basilio Brown MD      atorvastatin  10 mg Oral Daily With Dinner WALLY Baptiste      benztropine  1 mg Intramuscular Q4H PRN Max 6/day Basilio Brown MD      benztropine  1 mg Oral Q4H PRN Max 6/day Basilio Brown MD      Cholecalciferol  2,000 Units Oral Daily WALLY Baptiste      cyanocobalamin  1,000 mcg Oral Daily WALLY Baptiste      Diclofenac Sodium  2 g Topical 4x Daily PRN WALLY Baptiste      hydrOXYzine HCL  50 mg Oral Q6H PRN Max 4/day Basilio Brown MD      Or    diphenhydrAMINE  50 mg Intramuscular Q6H PRN Basilio Brown MD      famotidine  20 mg Oral BID WALLY Baptiste      hydrOXYzine HCL  100 mg Oral Q6H PRN Max 4/day Basilio Brown MD      Or    LORazepam  2 mg Intramuscular Q6H PRN Basilio Brown MD      hydrOXYzine HCL  25 mg Oral Q6H PRN Max 4/day Basilio Brown MD      [START ON 7/11/2024] levothyroxine  37.5 mcg Oral Early Morning WALLY Baptiste      methocarbamol  500 mg Oral Q6H PRN WALLY Baptiste      OLANZapine  5 mg Oral Q4H PRN Max 3/day Basilio Brown MD      Or    OLANZapine  2.5 mg Intramuscular Q4H PRN Max 3/day Basilio Brown MD      OLANZapine  5 mg Oral  Q3H PRN Max 3/day Basilio Brown MD      Or    OLANZapine  5 mg Intramuscular Q3H PRN Max 3/day Basilio Brown MD      OLANZapine  2.5 mg Oral Q4H PRN Max 6/day Basilio Brown MD      polyethylene glycol  17 g Oral Daily PRN Basilio Brown MD      propranolol  10 mg Oral Q8H PRN Basilio Brown MD      senna-docusate sodium  1 tablet Oral Daily PRN Basilio Brown MD      sertraline  125 mg Oral Daily WALLY Gomez      traZODone  50 mg Oral HS PRN Basilio Brown MD         Risks / Benefits of Treatment:  Risks, benefits, and possible side effects of medications explained to patient and patient verbalizes understanding and agreement for treatment.    Counseling / Coordination of Care:  Patient's progress reviewed with nursing staff.  Medications, treatment progress and treatment plan reviewed with patient.  Supportive counseling provided to the patient.    Total floor/unit time spent today 25 minutes. Greater than 50% of total time was spent with the patient and / or family counseling and / or coordination of care. A description of the counseling / coordination of care: medication education, treatment plan, supportive therapy.

## 2024-07-10 NOTE — NURSING NOTE
Visible at times. Quiet and standing about the unit alone. Pleasant during interaction. Compliant with medications. Denies symptoms. Waiting placement.

## 2024-07-10 NOTE — TREATMENT PLAN
TREATMENT PLAN REVIEW - Behavioral Health Franco Roberson 39 y.o. 1985 male MRN: [id number]    [facility] 3B BEHAVIORAL [facility] Room / Bed: [facility] 344-01 Encounter: [id number]        Admit Date/Time:  5/14/2024  4:45 PM    Treatment Team:   MD Isaac Vargas RN Rebecca Sussman Jennifer King Tara Nickens Karissa Marie Kormandy, CRNP Karalyn Binder Maria Lamura Jaidah Smith    Diagnosis: Principal Problem:    MDD (major depressive disorder), recurrent severe, without psychosis (HCC)  Active Problems:    Unspecified depressive disorder (HCC)    Medical clearance for psychiatric admission    Hyperlipidemia    Vitamin D deficiency    B12 deficiency    Hypothyroidism    Rule Out Autism spectrum disorder      Patient Strengths/Assets: ability for insight, cooperative, communication skills, motivated, negotiates basic needs, patient is on a voluntary commitment, patient is willing to work on problems, reasoning ability      Patient Barriers/Limitations: difficulty adapting, lack of stable employment, limited education, poor self-care, poor support system    Short Term Goals: decrease in depressive symptoms, decrease in anxiety symptoms, ability to stay safe on the unit, ability to stay free from restraints, improvement in ability to express basic needs, improvement in insight, improvement in reality testing, improvement in reasoning ability, improvement in self care, sleep improvement, increase in group attendance, increase in group participation, increase in socialization with peers on the unit, acceptance of need for psychiatric treatment, acceptance of psychiatric medications    Long Term Goals: improvement in depression, improvement in anxiety, free of suicidal thoughts, free of homicidal thoughts, no self abusive behavior, improved insight, acceptance of need for psychiatric medications, acceptance of need for  psychiatric treatment, acceptance of need for psychiatric follow up after discharge, acceptance of psychiatric diagnosis, adequate self care, adequate sleep, adequate appetite, appropriate interaction with peers    Progress Towards Goals: continue psychiatric medications as prescribed, progressing slowly, participates in milieu therapy, depression is improving, attends to more to ADLs    Recommended Treatment: medication management, patient medication education, group therapy, milieu therapy, continued Behavioral Health psychiatric evaluation/assessment process     Treatment Frequency: daily medication monitoring, group and milieu therapy daily, monitoring through interdisciplinary rounds, monitoring through weekly patient care conferences    Expected Discharge Date: 30 days - 8/9/2024; pending placement    Discharge Plan: placement in group home, referral for outpatient medication management with a psychiatrist, referral for outpatient psychotherapy    Treatment Plan Created/Updated By: Litzy James DO

## 2024-07-11 PROCEDURE — 99232 SBSQ HOSP IP/OBS MODERATE 35: CPT

## 2024-07-11 RX ADMIN — ATORVASTATIN CALCIUM 10 MG: 10 TABLET, FILM COATED ORAL at 16:11

## 2024-07-11 RX ADMIN — SERTRALINE HYDROCHLORIDE 125 MG: 100 TABLET ORAL at 08:23

## 2024-07-11 RX ADMIN — FAMOTIDINE 20 MG: 20 TABLET ORAL at 17:25

## 2024-07-11 RX ADMIN — CYANOCOBALAMIN TAB 1000 MCG 1000 MCG: 1000 TAB at 08:23

## 2024-07-11 RX ADMIN — ARIPIPRAZOLE 5 MG: 5 TABLET ORAL at 08:23

## 2024-07-11 RX ADMIN — LEVOTHYROXINE SODIUM 37.5 MCG: 125 TABLET ORAL at 05:29

## 2024-07-11 RX ADMIN — FAMOTIDINE 20 MG: 20 TABLET ORAL at 08:24

## 2024-07-11 RX ADMIN — CHOLECALCIFEROL TAB 25 MCG (1000 UNIT) 2000 UNITS: 25 TAB at 08:24

## 2024-07-11 NOTE — NURSING NOTE
Patient pleasant on approach, denies all psychiatric symptoms at this time. Visible walking unit, compliant with medication and unit routine.

## 2024-07-11 NOTE — PLAN OF CARE
Problem: Ineffective Coping  Goal: Identifies ineffective coping skills  Outcome: Progressing  Goal: Identifies healthy coping skills  Outcome: Progressing  Goal: Demonstrates healthy coping skills  Outcome: Progressing  Goal: Participates in unit activities  Description: Interventions:  - Provide therapeutic environment   - Provide required programming   - Redirect inappropriate behaviors   Outcome: Progressing     Problem: Risk for Self Injury/Neglect  Goal: Treatment Goal: Remain safe during length of stay, learn and adopt new coping skills, and be free of self-injurious ideation, impulses and acts at the time of discharge  Outcome: Progressing     Problem: Depression  Goal: Treatment Goal: Demonstrate behavioral control of depressive symptoms, verbalize feelings of improved mood/affect, and adopt new coping skills prior to discharge  Outcome: Progressing  Goal: Verbalize thoughts and feelings  Description: Interventions:  - Assess and re-assess patient's level of risk   - Engage patient in 1:1 interactions, daily, for a minimum of 15 minutes   - Encourage patient to express feelings, fears, frustrations, hopes   Outcome: Progressing  Goal: Refrain from harming self  Description: Interventions:  - Monitor patient closely, per order   - Supervise medication ingestion, monitor effects and side effects   Outcome: Progressing  Goal: Refrain from isolation  Description: Interventions:  - Develop a trusting relationship   - Encourage socialization   Outcome: Progressing  Goal: Refrain from self-neglect  Outcome: Progressing  Goal: Attend and participate in unit activities, including therapeutic, recreational, and educational groups  Description: Interventions:  - Provide therapeutic and educational activities daily, encourage attendance and participation, and document same in the medical record   Outcome: Progressing  Goal: Complete daily ADLs, including personal hygiene independently, as able  Description:  Interventions:  - Observe, teach, and assist patient with ADLS  -  Monitor and promote a balance of rest/activity, with adequate nutrition and elimination   Outcome: Progressing     Problem: Anxiety  Goal: Anxiety is at manageable level  Description: Interventions:  - Assess and monitor patient's anxiety level.   - Monitor for signs and symptoms (heart palpitations, chest pain, shortness of breath, headaches, nausea, feeling jumpy, restlessness, irritable, apprehensive).   - Collaborate with interdisciplinary team and initiate plan and interventions as ordered.  - [place] patient to unit/surroundings  - Explain treatment plan  - Encourage participation in care  - Encourage verbalization of concerns/fears  - Identify coping mechanisms  - Assist in developing anxiety-reducing skills  - Administer/offer alternative therapies  - Limit or eliminate stimulants  Outcome: Progressing     Problem: Knowledge Deficit  Goal: Patient/family/caregiver demonstrates understanding of disease process, treatment plan, medications, and discharge instructions  Description: Complete learning assessment and assess knowledge base.  Interventions:  - Provide teaching at level of understanding  - Provide teaching via preferred learning methods  Outcome: Progressing     Problem: SLEEP DISTURBANCE  Goal: Will exhibit normal sleeping pattern  Description: Interventions:  -  Assess the patients sleep pattern, noting recent changes  - Administer medication as ordered  - Decrease environmental stimuli, including noise, as appropriate during the night  - Encourage the patient to actively participate in unit groups and or exercise during the day to enhance ability to achieve adequate sleep at night  - Assess the patient, in the morning, encouraging a description of sleep experience  Outcome: Progressing

## 2024-07-11 NOTE — PROGRESS NOTES
07/11/24 0826   Team Meeting   Meeting Type Daily Rounds   Initial Conference Date 07/11/24   Team Members Present   Team Members Present Physician;Nurse;;Occupational Therapist   Physician Team Member Alex/Gisela/Beatriz/Jacob   Nursing Team Member Roselyn   Care Management Team Member Gagan PAPPAS Team Member Luna   Patient/Family Present   Patient Present No   Patient's Family Present No     Patient continues to wait on placement. Claims to have had broken sleep last night. Encouraged to ask for prns. Discharge is pending.

## 2024-07-11 NOTE — PLAN OF CARE
Problem: Ineffective Coping  Goal: Identifies ineffective coping skills  7/11/2024 0220 by Yary Jones RN  Outcome: Progressing  7/11/2024 0219 by Yary Jones RN  Outcome: Progressing  Goal: Identifies healthy coping skills  7/11/2024 0220 by Yary Jones RN  Outcome: Progressing  7/11/2024 0219 by Yary Jones RN  Outcome: Progressing  Goal: Demonstrates healthy coping skills  7/11/2024 0220 by Yary Jones RN  Outcome: Progressing  7/11/2024 0219 by Yary Jones RN  Outcome: Progressing  Goal: Participates in unit activities  Description: Interventions:  - Provide therapeutic environment   - Provide required programming   - Redirect inappropriate behaviors   7/11/2024 0220 by Yary Jones RN  Outcome: Progressing  7/11/2024 0219 by Yary Jones RN  Outcome: Progressing     Problem: Risk for Self Injury/Neglect  Goal: Treatment Goal: Remain safe during length of stay, learn and adopt new coping skills, and be free of self-injurious ideation, impulses and acts at the time of discharge  7/11/2024 0220 by Yary Jones RN  Outcome: Progressing  7/11/2024 0219 by Yary Jones RN  Outcome: Progressing     Problem: Depression  Goal: Treatment Goal: Demonstrate behavioral control of depressive symptoms, verbalize feelings of improved mood/affect, and adopt new coping skills prior to discharge  7/11/2024 0220 by Yary Jones RN  Outcome: Progressing  7/11/2024 0219 by Yary Joens RN  Outcome: Progressing  Goal: Verbalize thoughts and feelings  Description: Interventions:  - Assess and re-assess patient's level of risk   - Engage patient in 1:1 interactions, daily, for a minimum of 15 minutes   - Encourage patient to express feelings, fears, frustrations, hopes   7/11/2024 0220 by Yary Jones RN  Outcome: Progressing  7/11/2024 0219 by Yary Jones RN  Outcome: Progressing  Goal: Refrain from harming self  Description: Interventions:  - Monitor  patient closely, per order   - Supervise medication ingestion, monitor effects and side effects   7/11/2024 0220 by Yary Jones RN  Outcome: Progressing  7/11/2024 0219 by Yary Jones RN  Outcome: Progressing  Goal: Refrain from isolation  Description: Interventions:  - Develop a trusting relationship   - Encourage socialization   7/11/2024 0220 by Yary Jones RN  Outcome: Progressing  7/11/2024 0219 by Yary Jones RN  Outcome: Progressing  Goal: Refrain from self-neglect  7/11/2024 0220 by Yary Jones RN  Outcome: Progressing  7/11/2024 0219 by Yary Jones RN  Outcome: Progressing  Goal: Attend and participate in unit activities, including therapeutic, recreational, and educational groups  Description: Interventions:  - Provide therapeutic and educational activities daily, encourage attendance and participation, and document same in the medical record   7/11/2024 0220 by Yary Jones RN  Outcome: Progressing  7/11/2024 0219 by Yary Jones RN  Outcome: Progressing  Goal: Complete daily ADLs, including personal hygiene independently, as able  Description: Interventions:  - Observe, teach, and assist patient with ADLS  -  Monitor and promote a balance of rest/activity, with adequate nutrition and elimination   7/11/2024 0220 by Yary Jones RN  Outcome: Progressing  7/11/2024 0219 by Yary Jones RN  Outcome: Progressing     Problem: Anxiety  Goal: Anxiety is at manageable level  Description: Interventions:  - Assess and monitor patient's anxiety level.   - Monitor for signs and symptoms (heart palpitations, chest pain, shortness of breath, headaches, nausea, feeling jumpy, restlessness, irritable, apprehensive).   - Collaborate with interdisciplinary team and initiate plan and interventions as ordered.  - [place] patient to unit/surroundings  - Explain treatment plan  - Encourage participation in care  - Encourage verbalization of concerns/fears  - Identify  coping mechanisms  - Assist in developing anxiety-reducing skills  - Administer/offer alternative therapies  - Limit or eliminate stimulants  7/11/2024 0220 by Yary Jones RN  Outcome: Progressing  7/11/2024 0219 by Yary Jones RN  Outcome: Progressing     Problem: Knowledge Deficit  Goal: Patient/family/caregiver demonstrates understanding of disease process, treatment plan, medications, and discharge instructions  Description: Complete learning assessment and assess knowledge base.  Interventions:  - Provide teaching at level of understanding  - Provide teaching via preferred learning methods  7/11/2024 0220 by Yary Jones RN  Outcome: Progressing  7/11/2024 0219 by Yary Jones RN  Outcome: Progressing     Problem: SLEEP DISTURBANCE  Goal: Will exhibit normal sleeping pattern  Description: Interventions:  -  Assess the patients sleep pattern, noting recent changes  - Administer medication as ordered  - Decrease environmental stimuli, including noise, as appropriate during the night  - Encourage the patient to actively participate in unit groups and or exercise during the day to enhance ability to achieve adequate sleep at night  - Assess the patient, in the morning, encouraging a description of sleep experience  7/11/2024 0220 by Yary Jones RN  Outcome: Progressing  7/11/2024 0219 by Yary Jones RN  Outcome: Progressing

## 2024-07-11 NOTE — PROGRESS NOTES
Progress Note - Behavioral Health   Franco Roberson 39 y.o. male MRN: [id number]  Unit/Bed#: [facility] 344-01 Encounter: [id number]      Assessment & Plan   Principal Problem:    MDD (major depressive disorder), recurrent severe, without psychosis (HCC)  Active Problems:    Unspecified depressive disorder (HCC)    Medical clearance for psychiatric admission    Hyperlipidemia    Vitamin D deficiency    B12 deficiency    Hypothyroidism    Rule Out Autism spectrum disorder      Subjective:  Patient was seen today for continuation of care, records reviewed and patient was discussed with the morning case review team.  Per staff, Franco remains pleasant, calm and cooperative on the unit.  He is meal and medication compliant.    Franco was seen today for psychiatric follow-up.  On assessment today, Franco was seen in the hallway away from peers.  Prior to interview, Franco was observed socializing with peer appropriately.  He reports some interrupted sleep last night.  He states his roommate snores and he wakes up about 4 times a night because of that.  He was educated on PRN medications available for insomnia if needed.  He was receptive and understanding of education.     Franco denies acute suicidal/self-harm ideation/intent/plan upon direct inquiry at this time.  Franco remains behaviorally appropriate, no agitation or aggression noted on exam or in report.  Franco also denies HI/AH/VH, and does not appear overtly manic nor does he present as internally preoccupied.  No overt delusions or paranoia are verbalized. Franco remains adherent to his current psychotropic medication regimen and denies any side effects from medications, as well as none noted on exam.    Recommended Treatment: Treatment plan and medication changes discussed and per the attending physician the plan is:    1.Continue with group therapy, milieu therapy and occupational therapy  2.Behavioral Health checks every 7 minutes  3.Continue frequent safety checks and  vitals per unit protocol  4.Continue with SLIM medical management as indicated  5.Continue with current medication regimen: Zoloft 125mg PO daily, and Abilify 5mg PO daily  6.Will review labs in the a.m.  7.Disposition Planning: Discharge planning and efforts remain ongoing, Rossy Avila     Vitals:  Vitals:    07/11/24 0700   BP: 119/69   Pulse: 77   Resp: 16   Temp: 97.6 °F (36.4 °C)   SpO2: 96%       Laboratory Results:  I have personally reviewed all pertinent laboratory/tests results.  Most Recent Labs:   Lab Results   Component Value Date    WBC 7.01 06/27/2024    RBC 4.54 06/27/2024    HGB 14.5 06/27/2024    HCT 44.4 06/27/2024     06/27/2024    RDW 12.0 06/27/2024    NEUTROABS 4.01 06/27/2024    SODIUM 139 07/10/2024    K 3.8 07/10/2024     07/10/2024    CO2 27 07/10/2024    BUN 16 07/10/2024    CREATININE 0.92 07/10/2024    GLUC 87 07/10/2024    GLUF 87 07/10/2024    CALCIUM 9.3 07/10/2024    AST 28 07/10/2024    ALT 36 07/10/2024    ALKPHOS 66 07/10/2024    TP 7.7 07/10/2024    ALB 4.4 07/10/2024    TBILI 0.55 07/10/2024    CHOLESTEROL 175 07/05/2024    HDL 53 07/05/2024    TRIG 99 07/05/2024    LDLCALC 102 (H) 07/05/2024    NONHDLC 122 07/05/2024    [id number] 5.705 (H) 07/10/2024    FREET4 0.54 (L) 07/10/2024       Psychiatric Review of Systems:  Behavior over the last 24 hours:  continues to slowly improve.   Sleep: interrupted   Appetite: adequate  Medication side effects: denies and none observed   ROS: no complaints, denies shortness of breath or chest pain and all other systems are negative for acute changes    Mental Status Evaluation:    Appearance:  disheveled, marginal hygiene, casual shirt and hospital pants    Behavior:  pleasant, cooperative, calm   Speech:  fluent, clear, scant   Mood:  euthymic   Affect:  constricted   Thought Process:  goal directed, linear   Associations: concrete associations   Thought Content:  no overt delusions   Perceptual Disturbances: denies auditory  or visual hallucinations when asked, does not appear responding to internal stimuli   Risk Potential: Suicidal ideation - None  Homicidal ideation - None  Potential for aggression - Not at present   Sensorium:  oriented to person, place, and time/date   Memory:  recent memory intact   Consciousness:  alert and awake   Attention/Concentration: attention span and concentration are age appropriate   Insight:  limited   Judgment: limited   Gait/Station: normal gait/station   Motor Activity: no abnormal movements     Progress Toward Goals:   This patient is stable and appears to be at baseline, however, requires ongoing hospitalization due to continued poor insight, judgement, and coping that poses a risk to patient in an unsupervised setting. Without medication management in place, patient is likely to decompensate rapidly and become a risk of danger to self or others.      Risk of Harm to Self:   Nursing Suicide Risk Assessment Last 24 hours: C-SSRS Risk (Since Last Contact)  Calculated C-SSRS Risk Score (Since Last Contact): No Risk Indicated  Current Specific Risk Factors include: mental illness diagnosis  Protective Factors: no current suicidal ideation, ability to communicate with staff on the unit, able to contract for safety on the unit  Based on today's assessment, Franco presents the following risk of harm to self: low    Risk of Harm to Others:  Nursing Homicide Risk Assessment: Violence Risk to Others: Denies within past 6 months  Current Specific Risk Factors include: social difficulties  Protective Factors: no current homicidal ideation, compliant with medications on the unit as ordered, compliant with unit milieu, follows staff redirection  Based on today's assessmentFranco presents the following risk of harm to others: low    The following interventions are recommended: behavioral checks every 7 minutes, continued hospitalization on locked unit        Behavioral Health Medications: all current active  meds have been reviewed and continue current psychiatric medications.  Current Facility-Administered Medications   Medication Dose Route Frequency Provider Last Rate    acetaminophen  650 mg Oral Q6H PRN Basilio Brown MD      acetaminophen  650 mg Oral Q4H PRN Basilio Brown MD      acetaminophen  975 mg Oral Q6H PRN Basilio Brown MD      aluminum-magnesium hydroxide-simethicone  30 mL Oral Q4H PRN Basilio Brown MD      ARIPiprazole  5 mg Oral Daily Basilio Panda MD      Artificial Tears  1 drop Both Eyes Q3H PRN Basilio Brown MD      atorvastatin  10 mg Oral Daily With Dinner WALLY Baptiste      benztropine  1 mg Intramuscular Q4H PRN Max 6/day Basilio Brown MD      benztropine  1 mg Oral Q4H PRN Max 6/day Basilio Brown MD      Cholecalciferol  2,000 Units Oral Daily WALLY Baptiste      cyanocobalamin  1,000 mcg Oral Daily WALLY Baptiste      Diclofenac Sodium  2 g Topical 4x Daily PRN WALLY Baptiste      hydrOXYzine HCL  50 mg Oral Q6H PRN Max 4/day Basilio Brown MD      Or    diphenhydrAMINE  50 mg Intramuscular Q6H PRN Basilio Brown MD      famotidine  20 mg Oral BID WALLY Baptiste      hydrOXYzine HCL  100 mg Oral Q6H PRN Max 4/day Basilio Brown MD      Or    LORazepam  2 mg Intramuscular Q6H PRN Basilio Brown MD      hydrOXYzine HCL  25 mg Oral Q6H PRN Max 4/day Basilio Brown MD      levothyroxine  37.5 mcg Oral Early Morning WALLY Baptiste      methocarbamol  500 mg Oral Q6H PRN WALLY Baptiste      OLANZapine  5 mg Oral Q4H PRN Max 3/day Basilio Brown MD      Or    OLANZapine  2.5 mg Intramuscular Q4H PRN Max 3/day Basilio Brown MD      OLANZapine  5 mg Oral Q3H PRN Max 3/day Basilio Brown MD      Or    OLANZapine  5 mg Intramuscular Q3H PRN Max 3/day Basilio Brown MD      OLANZapine  2.5 mg Oral Q4H PRN Max  6/day Basilio Brown MD      polyethylene glycol  17 g Oral Daily PRN Basilio Brown MD      propranolol  10 mg Oral Q8H PRN Basilio Brown MD      senna-docusate sodium  1 tablet Oral Daily PRN Basilio Brown MD      sertraline  125 mg Oral Daily WALLY Gomez      traZODone  50 mg Oral HS PRN Basilio Brown MD         Risks / Benefits of Treatment:  Risks, benefits, and possible side effects of medications explained to patient and patient verbalizes understanding and agreement for treatment.    Counseling / Coordination of Care:  Patient's progress reviewed with nursing staff.  Medications, treatment progress and treatment plan reviewed with patient.  Supportive counseling provided to the patient.    Total floor/unit time spent today 25 minutes. Greater than 50% of total time was spent with the patient and / or family counseling and / or coordination of care. A description of the counseling / coordination of care: medication education, treatment plan, supportive therapy.

## 2024-07-12 PROCEDURE — 99232 SBSQ HOSP IP/OBS MODERATE 35: CPT

## 2024-07-12 RX ADMIN — ATORVASTATIN CALCIUM 10 MG: 10 TABLET, FILM COATED ORAL at 17:20

## 2024-07-12 RX ADMIN — FAMOTIDINE 20 MG: 20 TABLET ORAL at 18:15

## 2024-07-12 RX ADMIN — TRAZODONE HYDROCHLORIDE 50 MG: 50 TABLET ORAL at 21:29

## 2024-07-12 RX ADMIN — CYANOCOBALAMIN TAB 1000 MCG 1000 MCG: 1000 TAB at 08:25

## 2024-07-12 RX ADMIN — SERTRALINE HYDROCHLORIDE 125 MG: 100 TABLET ORAL at 08:25

## 2024-07-12 RX ADMIN — ARIPIPRAZOLE 5 MG: 5 TABLET ORAL at 08:25

## 2024-07-12 RX ADMIN — FAMOTIDINE 20 MG: 20 TABLET ORAL at 08:25

## 2024-07-12 RX ADMIN — LEVOTHYROXINE SODIUM 37.5 MCG: 125 TABLET ORAL at 06:25

## 2024-07-12 RX ADMIN — CHOLECALCIFEROL TAB 25 MCG (1000 UNIT) 2000 UNITS: 25 TAB at 08:25

## 2024-07-12 NOTE — PLAN OF CARE
Pt attends and participates in group therapy. Pt appears to have insight into his history and how it affects him currently.

## 2024-07-12 NOTE — PROGRESS NOTES
"Progress Note - Behavioral Health   Franco Roberson 39 y.o. male MRN: [id number]  Unit/Bed#: -01 Encounter: [id number]      Assessment & Plan   Principal Problem:    MDD (major depressive disorder), recurrent severe, without psychosis (HCC)  Active Problems:    Unspecified depressive disorder (HCC)    Medical clearance for psychiatric admission    Hyperlipidemia    Vitamin D deficiency    B12 deficiency    Hypothyroidism    Rule Out Autism spectrum disorder      Subjective:  Patient was seen today for continuation of care, records reviewed and patient was discussed with the morning case review team.  Per staff, Franco remains cooperative on the unit and visible.  He has been reporting some interrupted sleep.    Franco was seen today for psychiatric follow-up.  On assessment today, Franco was seen in his room and he states he feels \"tired\" today.  He reports he has been waking up multiple times a night and has been feeling more anxious.  He is anxious about his eventual discharge.  He is noticing more consistent anxiety throughout the day.  He is agreeable to increasing Zoloft to 150mg daily for anxiety symptoms.      Franco denies acute suicidal/self-harm ideation/intent/plan upon direct inquiry at this time.  Franco remains behaviorally appropriate, no agitation or aggression noted on exam or in report.  Franco also denies HI/AH/VH, and does not appear overtly manic nor does he present as internally preoccupied.  No overt delusions or paranoia are verbalized. Franco remains adherent to his current psychotropic medication regimen and denies any side effects from medications, as well as none noted on exam.    Recommended Treatment: Treatment plan and medication changes discussed and per the attending physician the plan is:    1.Continue with group therapy, milieu therapy and occupational therapy  2.Behavioral Health checks every 7 minutes  3.Continue frequent safety checks and vitals per unit protocol  4.Continue " with SLIM medical management as indicated  5.Increasing Zoloft  to 150mg PO daily for anxiety/depression, and continue Abilify 5mg PO daily  6.Will review labs in the a.m.  7.Disposition Planning: Discharge planning and efforts remain ongoing, Rossy Avila     Vitals:  Vitals:    07/12/24 0700   BP: 119/72   Pulse: 67   Resp: 16   Temp: (!) 96 °F (35.6 °C)   SpO2: 97%       Laboratory Results:  I have personally reviewed all pertinent laboratory/tests results.  Most Recent Labs:   Lab Results   Component Value Date    WBC 7.01 06/27/2024    RBC 4.54 06/27/2024    HGB 14.5 06/27/2024    HCT 44.4 06/27/2024     06/27/2024    RDW 12.0 06/27/2024    NEUTROABS 4.01 06/27/2024    SODIUM 139 07/10/2024    K 3.8 07/10/2024     07/10/2024    CO2 27 07/10/2024    BUN 16 07/10/2024    CREATININE 0.92 07/10/2024    GLUC 87 07/10/2024    GLUF 87 07/10/2024    CALCIUM 9.3 07/10/2024    AST 28 07/10/2024    ALT 36 07/10/2024    ALKPHOS 66 07/10/2024    TP 7.7 07/10/2024    ALB 4.4 07/10/2024    TBILI 0.55 07/10/2024    CHOLESTEROL 175 07/05/2024    HDL 53 07/05/2024    TRIG 99 07/05/2024    LDLCALC 102 (H) 07/05/2024    NONHDLC 122 07/05/2024    [id number] 5.705 (H) 07/10/2024    FREET4 0.54 (L) 07/10/2024       Psychiatric Review of Systems:  Behavior over the last 24 hours:  unchanged  Sleep: interrupted   Appetite: adequate  Medication side effects: denies and none observed   ROS: no complaints, denies shortness of breath or chest pain and all other systems are negative for acute changes    Mental Status Evaluation:    Appearance:  disheveled, marginal hygiene, casual shirt and hospital pants    Behavior:  pleasant, cooperative, calm   Speech:  fluent, clear, scant   Mood:  anxious   Affect:  constricted   Thought Process:  goal directed, linear   Associations: concrete associations   Thought Content:  no overt delusions   Perceptual Disturbances: denies auditory or visual hallucinations when asked, does not  appear responding to internal stimuli   Risk Potential: Suicidal ideation - None  Homicidal ideation - None  Potential for aggression - Not at present   Sensorium:  oriented to person, place, and time/date   Memory:  recent memory intact   Consciousness:  alert and awake   Attention/Concentration: attention span and concentration are age appropriate   Insight:  limited   Judgment: limited   Gait/Station: normal gait/station   Motor Activity: no abnormal movements     Progress Toward Goals:   This patient is stable and appears to be at baseline, however, requires ongoing hospitalization due to continued poor insight, judgement, and coping that poses a risk to patient in an unsupervised setting. Without medication management in place, patient is likely to decompensate rapidly and become a risk of danger to self or others.      Risk of Harm to Self:   Nursing Suicide Risk Assessment Last 24 hours: C-SSRS Risk (Since Last Contact)  Calculated C-SSRS Risk Score (Since Last Contact): No Risk Indicated  Current Specific Risk Factors include: mental illness diagnosis  Protective Factors: no current suicidal ideation, ability to communicate with staff on the unit, able to contract for safety on the unit  Based on today's assessment, Franco presents the following risk of harm to self: low    Risk of Harm to Others:  Nursing Homicide Risk Assessment: Violence Risk to Others: Denies within past 6 months  Current Specific Risk Factors include: social difficulties  Protective Factors: no current homicidal ideation, compliant with medications on the unit as ordered, compliant with unit milieu, follows staff redirection  Based on today's assessment, Franco presents the following risk of harm to others: low    The following interventions are recommended: behavioral checks every 7 minutes, continued hospitalization on locked unit        Behavioral Health Medications: all current active meds have been reviewed and planned medication  changes: increasing Zoloft to 150mg PO daily .  Current Facility-Administered Medications   Medication Dose Route Frequency Provider Last Rate    acetaminophen  650 mg Oral Q6H PRN Basilio Brown MD      acetaminophen  650 mg Oral Q4H PRN Basilio Brown MD      acetaminophen  975 mg Oral Q6H PRN Basilio Brown MD      aluminum-magnesium hydroxide-simethicone  30 mL Oral Q4H PRN Basilio Brown MD      ARIPiprazole  5 mg Oral Daily Basilio Panda MD      Artificial Tears  1 drop Both Eyes Q3H PRN Basilio Brown MD      atorvastatin  10 mg Oral Daily With Dinner WALLY Baptiste      benztropine  1 mg Intramuscular Q4H PRN Max 6/day Basilio Brown MD      benztropine  1 mg Oral Q4H PRN Max 6/day Basilio Brown MD      Cholecalciferol  2,000 Units Oral Daily WALLY Baptiste      cyanocobalamin  1,000 mcg Oral Daily WALLY Baptiste      Diclofenac Sodium  2 g Topical 4x Daily PRN WALLY Baptiste      hydrOXYzine HCL  50 mg Oral Q6H PRN Max 4/day Basilio Brown MD      Or    diphenhydrAMINE  50 mg Intramuscular Q6H PRN Basilio Brown MD      famotidine  20 mg Oral BID WALLY Baptiste      hydrOXYzine HCL  100 mg Oral Q6H PRN Max 4/day Basilio Brown MD      Or    LORazepam  2 mg Intramuscular Q6H PRN Basilio Brown MD      hydrOXYzine HCL  25 mg Oral Q6H PRN Max 4/day Basilio Brown MD      levothyroxine  37.5 mcg Oral Early Morning WALLY Baptiste      methocarbamol  500 mg Oral Q6H PRN WALLY Baptiste      OLANZapine  5 mg Oral Q4H PRN Max 3/day Basilio Brown MD      Or    OLANZapine  2.5 mg Intramuscular Q4H PRN Max 3/day Basilio Brown MD      OLANZapine  5 mg Oral Q3H PRN Max 3/day Basilio Brown MD      Or    OLANZapine  5 mg Intramuscular Q3H PRN Max 3/day Basilio Brown MD      OLANZapine  2.5 mg Oral Q4H PRN Max 6/day Basilio Brown,  MD      polyethylene glycol  17 g Oral Daily PRN Basilio Brown MD      propranolol  10 mg Oral Q8H PRN Basilio Brown MD      senna-docusate sodium  1 tablet Oral Daily PRN Basilio Brown MD      [START ON 7/13/2024] sertraline  150 mg Oral Daily WALLY Gomez      traZODone  50 mg Oral HS PRN Basilio Brown MD         Risks / Benefits of Treatment:  Risks, benefits, and possible side effects of medications explained to patient and patient verbalizes understanding and agreement for treatment.    Counseling / Coordination of Care:  Patient's progress reviewed with nursing staff.  Medications, treatment progress and treatment plan reviewed with patient.  Supportive counseling provided to the patient.    Total floor/unit time spent today 25 minutes. Greater than 50% of total time was spent with the patient and / or family counseling and / or coordination of care. A description of the counseling / coordination of care: medication education, treatment plan, supportive therapy.

## 2024-07-12 NOTE — PROGRESS NOTES
07/12/24 1208   Team Meeting   Meeting Type Tx Team Meeting   Initial Conference Date 07/12/24   Team Members Present   Team Members Present Physician;Nurse;   Physician Team Member Bedrock   Nursing Team Member James   Care Management Team Member Gagan   Patient/Family Present   Patient Present Yes   Patient's Family Present No     Tx plan was reviewed and discussed with Pt. Pt was encouraged to attend groups. Medication was discussed with Pt. Pt signed tx plan.

## 2024-07-12 NOTE — NURSING NOTE
Reports not sleeping well last night. Encouraged patient to ask for PRN if this occurs again tonight. Quiet and to himself. Denies symptoms. Waiting placement.

## 2024-07-12 NOTE — PROGRESS NOTES
07/12/24 0817   Team Meeting   Meeting Type Daily Rounds   Initial Conference Date 07/12/24   Team Members Present   Team Members Present Physician;Nurse;;Occupational Therapist   Physician Team Member Alex/Gisela/Beatriz/Jacob/Kisha   Nursing Team Member Roselyn   Care Management Team Member Gagan PAPPAS Team Member Luna   Patient/Family Present   Patient Present No   Patient's Family Present No     Patient had broken sleep last night and still did not ask for any prns to assist with sleep, encouraged to ask again by staff. He is calm and pleasant. Discharge is pending.

## 2024-07-12 NOTE — PLAN OF CARE
Problem: Ineffective Coping  Goal: Identifies ineffective coping skills  Outcome: Progressing  Goal: Identifies healthy coping skills  Outcome: Progressing  Goal: Demonstrates healthy coping skills  Outcome: Progressing  Goal: Participates in unit activities  Description: Interventions:  - Provide therapeutic environment   - Provide required programming   - Redirect inappropriate behaviors   Outcome: Progressing     Problem: Risk for Self Injury/Neglect  Goal: Treatment Goal: Remain safe during length of stay, learn and adopt new coping skills, and be free of self-injurious ideation, impulses and acts at the time of discharge  Outcome: Progressing     Problem: Depression  Goal: Treatment Goal: Demonstrate behavioral control of depressive symptoms, verbalize feelings of improved mood/affect, and adopt new coping skills prior to discharge  Outcome: Progressing  Goal: Verbalize thoughts and feelings  Description: Interventions:  - Assess and re-assess patient's level of risk   - Engage patient in 1:1 interactions, daily, for a minimum of 15 minutes   - Encourage patient to express feelings, fears, frustrations, hopes   Outcome: Progressing  Goal: Refrain from harming self  Description: Interventions:  - Monitor patient closely, per order   - Supervise medication ingestion, monitor effects and side effects   Outcome: Progressing  Goal: Refrain from isolation  Description: Interventions:  - Develop a trusting relationship   - Encourage socialization   Outcome: Progressing  Goal: Refrain from self-neglect  Outcome: Progressing  Goal: Attend and participate in unit activities, including therapeutic, recreational, and educational groups  Description: Interventions:  - Provide therapeutic and educational activities daily, encourage attendance and participation, and document same in the medical record   Outcome: Progressing  Goal: Complete daily ADLs, including personal hygiene independently, as able  Description:  Interventions:  - Observe, teach, and assist patient with ADLS  -  Monitor and promote a balance of rest/activity, with adequate nutrition and elimination   Outcome: Progressing     Problem: Anxiety  Goal: Anxiety is at manageable level  Description: Interventions:  - Assess and monitor patient's anxiety level.   - Monitor for signs and symptoms (heart palpitations, chest pain, shortness of breath, headaches, nausea, feeling jumpy, restlessness, irritable, apprehensive).   - Collaborate with interdisciplinary team and initiate plan and interventions as ordered.  - [place] patient to unit/surroundings  - Explain treatment plan  - Encourage participation in care  - Encourage verbalization of concerns/fears  - Identify coping mechanisms  - Assist in developing anxiety-reducing skills  - Administer/offer alternative therapies  - Limit or eliminate stimulants  Outcome: Progressing     Problem: DISCHARGE PLANNING - CARE MANAGEMENT  Goal: Discharge to post-acute care or home with appropriate resources  Description: INTERVENTIONS:  - Conduct assessment to determine patient/family and health care team treatment goals, and need for post-acute services based on payer coverage, community resources, and patient preferences, and barriers to discharge  - Address psychosocial, clinical, and financial barriers to discharge as identified in assessment in conjunction with the patient/family and health care team  - Arrange appropriate level of post-acute services according to patient’s   needs and preference and payer coverage in collaboration with the physician and health care team  - Communicate with and update the patient/family, physician, and health care team regarding progress on the discharge plan  - Arrange appropriate transportation to post-acute venues  Outcome: Progressing     Problem: Knowledge Deficit  Goal: Patient/family/caregiver demonstrates understanding of disease process, treatment plan, medications, and discharge  instructions  Description: Complete learning assessment and assess knowledge base.  Interventions:  - Provide teaching at level of understanding  - Provide teaching via preferred learning methods  Outcome: Progressing     Problem: SLEEP DISTURBANCE  Goal: Will exhibit normal sleeping pattern  Description: Interventions:  -  Assess the patients sleep pattern, noting recent changes  - Administer medication as ordered  - Decrease environmental stimuli, including noise, as appropriate during the night  - Encourage the patient to actively participate in unit groups and or exercise during the day to enhance ability to achieve adequate sleep at night  - Assess the patient, in the morning, encouraging a description of sleep experience  Outcome: Progressing

## 2024-07-12 NOTE — NURSING NOTE
Patient out of room on unit , appears to be in good spirits. Patient compliant with medications and routine vitals. Patient denies SI/HI/AH/VH at this time.

## 2024-07-13 PROCEDURE — 99232 SBSQ HOSP IP/OBS MODERATE 35: CPT | Performed by: PSYCHIATRY & NEUROLOGY

## 2024-07-13 RX ADMIN — CHOLECALCIFEROL TAB 25 MCG (1000 UNIT) 2000 UNITS: 25 TAB at 08:49

## 2024-07-13 RX ADMIN — ATORVASTATIN CALCIUM 10 MG: 10 TABLET, FILM COATED ORAL at 17:06

## 2024-07-13 RX ADMIN — ARIPIPRAZOLE 5 MG: 5 TABLET ORAL at 08:49

## 2024-07-13 RX ADMIN — LEVOTHYROXINE SODIUM 37.5 MCG: 125 TABLET ORAL at 06:32

## 2024-07-13 RX ADMIN — FAMOTIDINE 20 MG: 20 TABLET ORAL at 08:49

## 2024-07-13 RX ADMIN — CYANOCOBALAMIN TAB 1000 MCG 1000 MCG: 1000 TAB at 08:49

## 2024-07-13 RX ADMIN — SERTRALINE HYDROCHLORIDE 150 MG: 100 TABLET ORAL at 08:49

## 2024-07-13 RX ADMIN — FAMOTIDINE 20 MG: 20 TABLET ORAL at 17:06

## 2024-07-13 NOTE — PROGRESS NOTES
Psychiatric Progress Note - Department of Behavioral Health   Franco Roberson 39 y.o. male MRN: [id number]  Unit/Bed#: [facility] 344-01 Encounter: [id number]    ASSESSMENT & PLAN     Diagnoses:   Principal Problem:    MDD (major depressive disorder), recurrent severe, without psychosis (HCC)  Active Problems:    Unspecified depressive disorder (HCC)    Medical clearance for psychiatric admission    Hyperlipidemia    Vitamin D deficiency    B12 deficiency    Hypothyroidism    Rule Out Autism spectrum disorder      Treatment Recommendations/Precautions:  Continue to promote patient participation in therapeutic milieu.  Continue medical management per medicine.  Continue previously prescribed psychotropic medication regimen; see below.  Continue behavioral health checks q.7 minutes.   Continue vitals per behavioral health unit protocol.  Discharge date per primary team; 201 commitment status.    SUBJECTIVE     Patient evaluated this a.m. for continuity of care. Patient was discussed at length with nursing and treatment team. Per nursing, patient remains calm, cooperative, intermittently interactive in the milieu, adherent to his medications less any acute adverse effects. No acute distress is noted throughout evaluation. Franco Roberson denies suicidal/homicidal ideation in addition to thoughts of self-injury, receptive to crisis planning provided by this writer, contacting for safety in the inpatient setting, admitting to an ability to appropriately confide in staff including this writer. Patient admits to dysphoric mood including depression, albeit improved, admitting to slightly improved sleep overnight as a result of as needed Trazodone use.     PSYCHIATRIC REVIEW OF SYSTEMS     Behavior over the last 24 hours:  unchanged  Sleep: slightly improved  Appetite: adequate  Medication side effects: No    REVIEW OF SYSTEMS   Review of systems: no complaints    OBJECTIVE     Vital Signs in Past 24 Hours:  Temp:  [97.1 °F (36.2  °C)-97.2 °F (36.2 °C)] 97.2 °F (36.2 °C)  HR:  [73-76] 73  Resp:  [16] 16  BP: (124-125)/(65-75) 124/75    Intake/Output in Past 24 hours:  No intake/output data recorded.  No intake/output data recorded.        Laboratory Results:  I have personally reviewed all pertinent laboratory/tests results.  Most Recent Labs:   Lab Results   Component Value Date    WBC 7.01 06/27/2024    RBC 4.54 06/27/2024    HGB 14.5 06/27/2024    HCT 44.4 06/27/2024     06/27/2024    RDW 12.0 06/27/2024    NEUTROABS 4.01 06/27/2024    SODIUM 139 07/10/2024    K 3.8 07/10/2024     07/10/2024    CO2 27 07/10/2024    BUN 16 07/10/2024    CREATININE 0.92 07/10/2024    GLUC 87 07/10/2024    GLUF 87 07/10/2024    CALCIUM 9.3 07/10/2024    AST 28 07/10/2024    ALT 36 07/10/2024    ALKPHOS 66 07/10/2024    TP 7.7 07/10/2024    ALB 4.4 07/10/2024    TBILI 0.55 07/10/2024    CHOLESTEROL 175 07/05/2024    HDL 53 07/05/2024    TRIG 99 07/05/2024    LDLCALC 102 (H) 07/05/2024    NONHDLC 122 07/05/2024    [id number] 5.705 (H) 07/10/2024    FREET4 0.54 (L) 07/10/2024       Behavioral Health Medications: all current active meds have been reviewed, continue current psychiatric medications, and current meds:   Current Facility-Administered Medications   Medication Dose Route Frequency    acetaminophen (TYLENOL) tablet 650 mg  650 mg Oral Q6H PRN    acetaminophen (TYLENOL) tablet 650 mg  650 mg Oral Q4H PRN    acetaminophen (TYLENOL) tablet 975 mg  975 mg Oral Q6H PRN    aluminum-magnesium hydroxide-simethicone (MAALOX) oral suspension 30 mL  30 mL Oral Q4H PRN    ARIPiprazole (ABILIFY) tablet 5 mg  5 mg Oral Daily    Artificial Tears ophthalmic solution 1 drop  1 drop Both Eyes Q3H PRN    atorvastatin (LIPITOR) tablet 10 mg  10 mg Oral Daily With Dinner    benztropine (COGENTIN) injection 1 mg  1 mg Intramuscular Q4H PRN Max 6/day    benztropine (COGENTIN) tablet 1 mg  1 mg Oral Q4H PRN Max 6/day    Cholecalciferol (VITAMIN D3) tablet 2,000  Units  2,000 Units Oral Daily    cyanocobalamin (VITAMIN B-12) tablet 1,000 mcg  1,000 mcg Oral Daily    Diclofenac Sodium (VOLTAREN) 1 % topical gel 2 g  2 g Topical 4x Daily PRN    hydrOXYzine HCL (ATARAX) tablet 50 mg  50 mg Oral Q6H PRN Max 4/day    Or    diphenhydrAMINE (BENADRYL) injection 50 mg  50 mg Intramuscular Q6H PRN    famotidine (PEPCID) tablet 20 mg  20 mg Oral BID    hydrOXYzine HCL (ATARAX) tablet 100 mg  100 mg Oral Q6H PRN Max 4/day    Or    LORazepam (ATIVAN) injection 2 mg  2 mg Intramuscular Q6H PRN    hydrOXYzine HCL (ATARAX) tablet 25 mg  25 mg Oral Q6H PRN Max 4/day    levothyroxine tablet 37.5 mcg  37.5 mcg Oral Early Morning    methocarbamol (ROBAXIN) tablet 500 mg  500 mg Oral Q6H PRN    OLANZapine (ZyPREXA) tablet 5 mg  5 mg Oral Q4H PRN Max 3/day    Or    OLANZapine (ZyPREXA) IM injection 2.5 mg  2.5 mg Intramuscular Q4H PRN Max 3/day    OLANZapine (ZyPREXA) tablet 5 mg  5 mg Oral Q3H PRN Max 3/day    Or    OLANZapine (ZyPREXA) IM injection 5 mg  5 mg Intramuscular Q3H PRN Max 3/day    OLANZapine (ZyPREXA) tablet 2.5 mg  2.5 mg Oral Q4H PRN Max 6/day    polyethylene glycol (MIRALAX) packet 17 g  17 g Oral Daily PRN    propranolol (INDERAL) tablet 10 mg  10 mg Oral Q8H PRN    senna-docusate sodium (SENOKOT S) 8.6-50 mg per tablet 1 tablet  1 tablet Oral Daily PRN    sertraline (ZOLOFT) tablet 150 mg  150 mg Oral Daily    traZODone (DESYREL) tablet 50 mg  50 mg Oral HS PRN   .    Risks, benefits and possible side effects of Medications:   Risks, benefits, and possible side effects of medications explained to patient and patient verbalizes understanding.      Mental Status Evaluation:  Appearance:  age appropriate, casually dressed, and disheveled   Behavior:  psychomotor retardation, calm, cooperative   Speech:  normal pitch and volume, scant   Mood:  depressed and dysthymic   Affect:  constricted and mood-congruent   Language sparse   Thought Process:  goal directed   Thought Content:   No overt obsessions or delusions   Perceptual Disturbances: None   Risk Potential: Suicidal Ideations none, Homicidal Ideations none, and Potential for Aggression No   Sensorium:  person, place, and time/date   Cognition:  recent and remote memory grossly intact   Consciousness:  alert and awake    Attention: attention span appeared shorter than expected for age   Insight:  limited   Judgment: limited   Intellect Not assessed   Gait/Station: normal gait/station and normal balance   Motor Activity: no abnormal movements     Memory: Short and long term memory  fair     Progress Toward Goals: progressing, as evidenced by their participation (or lack thereof) in individual, social and therapeutic milieu in addition to adherence to their medication regimen.    Recommended Treatment:   See above for assessment and plan.    Inpatient Psychiatric Certification: Based upon physical, mental and social evaluations, I certify that inpatient psychiatric services are medically necessary for this patient for a duration of greater than 2 midnights for the treatment of MDD (major depressive disorder), recurrent severe, without psychosis (HCC) including psychotropic medication management, participation in the therapeutic milieu and referrals as indicated. Available alternative community resources do not meet the patient's mental health care needs. I further attest that an established written individualized plan of care has been implemented and is outlined in the patient's medical records.    Counseling/Coordination of Care    Total unit time spent today was greater than 15 minutes. Greater than 50% of total time was spent with the patient and/or patient's relatives and/or coordination of patient's care.     Patient's rights, confidentiality, exceptions to confidentiality, use of electronic medical record including appropriate staff access to medical record regarding behavioral health services and consent to treatment were  reviewed.    Note Share:     This note was not shared with the patient due to reasonable likelihood of causing patient harm     This note has been constructed using a voice recognition system.    There may be translation, syntax, or grammatical errors. If you have any questions, please contact the dictating provider.    Jordan Christopher Holter, DO 07/13/24

## 2024-07-13 NOTE — NURSING NOTE
Pt visible on unit in milieu and dayroom. He is pleasant with staff and peers. Medication and meal compliant. He denies SI/HI/AH/VH. He attended and participated in group today. No PRN's requested. Continuous rounding maintained. No unmet needs.

## 2024-07-13 NOTE — PLAN OF CARE
Problem: Ineffective Coping  Goal: Identifies ineffective coping skills  Outcome: Progressing  Goal: Identifies healthy coping skills  Outcome: Progressing  Goal: Demonstrates healthy coping skills  Outcome: Progressing  Goal: Participates in unit activities  Description: Interventions:  - Provide therapeutic environment   - Provide required programming   - Redirect inappropriate behaviors   Outcome: Progressing     Problem: Risk for Self Injury/Neglect  Goal: Treatment Goal: Remain safe during length of stay, learn and adopt new coping skills, and be free of self-injurious ideation, impulses and acts at the time of discharge  Outcome: Progressing     Problem: Depression  Goal: Treatment Goal: Demonstrate behavioral control of depressive symptoms, verbalize feelings of improved mood/affect, and adopt new coping skills prior to discharge  Outcome: Progressing  Goal: Verbalize thoughts and feelings  Description: Interventions:  - Assess and re-assess patient's level of risk   - Engage patient in 1:1 interactions, daily, for a minimum of 15 minutes   - Encourage patient to express feelings, fears, frustrations, hopes   Outcome: Progressing  Goal: Refrain from harming self  Description: Interventions:  - Monitor patient closely, per order   - Supervise medication ingestion, monitor effects and side effects   Outcome: Progressing  Goal: Refrain from isolation  Description: Interventions:  - Develop a trusting relationship   - Encourage socialization   Outcome: Progressing  Goal: Refrain from self-neglect  Outcome: Progressing  Goal: Attend and participate in unit activities, including therapeutic, recreational, and educational groups  Description: Interventions:  - Provide therapeutic and educational activities daily, encourage attendance and participation, and document same in the medical record   Outcome: Progressing  Goal: Complete daily ADLs, including personal hygiene independently, as able  Description:  Interventions:  - Observe, teach, and assist patient with ADLS  -  Monitor and promote a balance of rest/activity, with adequate nutrition and elimination   Outcome: Progressing     Problem: Anxiety  Goal: Anxiety is at manageable level  Description: Interventions:  - Assess and monitor patient's anxiety level.   - Monitor for signs and symptoms (heart palpitations, chest pain, shortness of breath, headaches, nausea, feeling jumpy, restlessness, irritable, apprehensive).   - Collaborate with interdisciplinary team and initiate plan and interventions as ordered.  - [place] patient to unit/surroundings  - Explain treatment plan  - Encourage participation in care  - Encourage verbalization of concerns/fears  - Identify coping mechanisms  - Assist in developing anxiety-reducing skills  - Administer/offer alternative therapies  - Limit or eliminate stimulants  Outcome: Progressing     Problem: DISCHARGE PLANNING - CARE MANAGEMENT  Goal: Discharge to post-acute care or home with appropriate resources  Description: INTERVENTIONS:  - Conduct assessment to determine patient/family and health care team treatment goals, and need for post-acute services based on payer coverage, community resources, and patient preferences, and barriers to discharge  - Address psychosocial, clinical, and financial barriers to discharge as identified in assessment in conjunction with the patient/family and health care team  - Arrange appropriate level of post-acute services according to patient’s   needs and preference and payer coverage in collaboration with the physician and health care team  - Communicate with and update the patient/family, physician, and health care team regarding progress on the discharge plan  - Arrange appropriate transportation to post-acute venues  Outcome: Progressing     Problem: Knowledge Deficit  Goal: Patient/family/caregiver demonstrates understanding of disease process, treatment plan, medications, and discharge  instructions  Description: Complete learning assessment and assess knowledge base.  Interventions:  - Provide teaching at level of understanding  - Provide teaching via preferred learning methods  Outcome: Progressing     Problem: SLEEP DISTURBANCE  Goal: Will exhibit normal sleeping pattern  Description: Interventions:  -  Assess the patients sleep pattern, noting recent changes  - Administer medication as ordered  - Decrease environmental stimuli, including noise, as appropriate during the night  - Encourage the patient to actively participate in unit groups and or exercise during the day to enhance ability to achieve adequate sleep at night  - Assess the patient, in the morning, encouraging a description of sleep experience  Outcome: Progressing

## 2024-07-13 NOTE — NURSING NOTE
Pt visible walking halls and social with peer and staff. Denies SI/HI and hallucinations. Pleasant in conversation and denies unmet needs or concerns at this time. Compliant with unit routines and care. Safety checks continue.

## 2024-07-13 NOTE — NURSING NOTE
Patient noted out of room on unit, pleasant and cooperative. Patient reports feeling tired from poor sleep the past few nights. This nurse offered prn trazodone and patient agreed . Prn trazodone 50 mg given. Denies SI/HI/AH/VH at this time.

## 2024-07-14 PROCEDURE — 99232 SBSQ HOSP IP/OBS MODERATE 35: CPT | Performed by: PSYCHIATRY & NEUROLOGY

## 2024-07-14 RX ADMIN — CHOLECALCIFEROL TAB 25 MCG (1000 UNIT) 2000 UNITS: 25 TAB at 08:37

## 2024-07-14 RX ADMIN — CYANOCOBALAMIN TAB 1000 MCG 1000 MCG: 1000 TAB at 08:36

## 2024-07-14 RX ADMIN — FAMOTIDINE 20 MG: 20 TABLET ORAL at 17:09

## 2024-07-14 RX ADMIN — ARIPIPRAZOLE 5 MG: 5 TABLET ORAL at 08:36

## 2024-07-14 RX ADMIN — ATORVASTATIN CALCIUM 10 MG: 10 TABLET, FILM COATED ORAL at 17:09

## 2024-07-14 RX ADMIN — SERTRALINE HYDROCHLORIDE 150 MG: 100 TABLET ORAL at 08:37

## 2024-07-14 RX ADMIN — FAMOTIDINE 20 MG: 20 TABLET ORAL at 08:37

## 2024-07-14 RX ADMIN — LEVOTHYROXINE SODIUM 37.5 MCG: 125 TABLET ORAL at 06:12

## 2024-07-14 NOTE — PROGRESS NOTES
Psychiatric Progress Note - Department of Behavioral Health   Franco Roberson 39 y.o. male MRN: [id number]  Unit/Bed#: [facility] 344-01 Encounter: [id number]    ASSESSMENT & PLAN     Diagnoses:   Principal Problem:    MDD (major depressive disorder), recurrent severe, without psychosis (HCC)  Active Problems:    Unspecified depressive disorder (HCC)    Medical clearance for psychiatric admission    Hyperlipidemia    Vitamin D deficiency    B12 deficiency    Hypothyroidism    Rule Out Autism spectrum disorder      Treatment Recommendations/Precautions:  Continue to promote patient participation in therapeutic milieu.  Continue medical management per medicine.  Continue previously prescribed psychotropic medication regimen; see below.  Continue behavioral health checks q.7 minutes.   Continue vitals per behavioral health unit protocol.  Discharge date per primary team; 201 commitment status.    SUBJECTIVE     Patient evaluated this a.m. for continuity of care. Patient was discussed at length with nursing and treatment team. Per nursing, patient remains calm and cooperative, intermittently interactive in the milieu, adherent to his medications without any acute adverse effects.. No acute distress is noted throughout evaluation. Franco Roberson denies suicidal/homicidal ideation in addition to thoughts of self-injury, receptive to crisis planning provided by this writer, contacting for safety in the inpatient setting, admitting to an ability to appropriately confide in staff including this writer.  Patient appears somewhat scant and sparse, admitting to dysphoric mood including depression and anxiety, denying any additional psychiatric complaints/concerns, pending placement    PSYCHIATRIC REVIEW OF SYSTEMS     Behavior over the last 24 hours:  unchanged  Sleep: adequate  Appetite: adequate  Medication side effects: No    REVIEW OF SYSTEMS   Review of systems: no complaints    OBJECTIVE     Vital Signs in Past 24 Hours:  Temp:   [97.1 °F (36.2 °C)] 97.1 °F (36.2 °C)  HR:  [72-74] 74  Resp:  [16] 16  BP: (112-119)/(65-68) 119/68    Intake/Output in Past 24 hours:  No intake/output data recorded.  No intake/output data recorded.        Laboratory Results:  I have personally reviewed all pertinent laboratory/tests results.  Most Recent Labs:   Lab Results   Component Value Date    WBC 7.01 06/27/2024    RBC 4.54 06/27/2024    HGB 14.5 06/27/2024    HCT 44.4 06/27/2024     06/27/2024    RDW 12.0 06/27/2024    NEUTROABS 4.01 06/27/2024    SODIUM 139 07/10/2024    K 3.8 07/10/2024     07/10/2024    CO2 27 07/10/2024    BUN 16 07/10/2024    CREATININE 0.92 07/10/2024    GLUC 87 07/10/2024    GLUF 87 07/10/2024    CALCIUM 9.3 07/10/2024    AST 28 07/10/2024    ALT 36 07/10/2024    ALKPHOS 66 07/10/2024    TP 7.7 07/10/2024    ALB 4.4 07/10/2024    TBILI 0.55 07/10/2024    CHOLESTEROL 175 07/05/2024    HDL 53 07/05/2024    TRIG 99 07/05/2024    LDLCALC 102 (H) 07/05/2024    NONHDLC 122 07/05/2024    [id number] 5.705 (H) 07/10/2024    FREET4 0.54 (L) 07/10/2024       Behavioral Health Medications: all current active meds have been reviewed, continue current psychiatric medications, and current meds:   Current Facility-Administered Medications   Medication Dose Route Frequency    acetaminophen (TYLENOL) tablet 650 mg  650 mg Oral Q6H PRN    acetaminophen (TYLENOL) tablet 650 mg  650 mg Oral Q4H PRN    acetaminophen (TYLENOL) tablet 975 mg  975 mg Oral Q6H PRN    aluminum-magnesium hydroxide-simethicone (MAALOX) oral suspension 30 mL  30 mL Oral Q4H PRN    ARIPiprazole (ABILIFY) tablet 5 mg  5 mg Oral Daily    Artificial Tears ophthalmic solution 1 drop  1 drop Both Eyes Q3H PRN    atorvastatin (LIPITOR) tablet 10 mg  10 mg Oral Daily With Dinner    benztropine (COGENTIN) injection 1 mg  1 mg Intramuscular Q4H PRN Max 6/day    benztropine (COGENTIN) tablet 1 mg  1 mg Oral Q4H PRN Max 6/day    Cholecalciferol (VITAMIN D3) tablet 2,000  Units  2,000 Units Oral Daily    cyanocobalamin (VITAMIN B-12) tablet 1,000 mcg  1,000 mcg Oral Daily    Diclofenac Sodium (VOLTAREN) 1 % topical gel 2 g  2 g Topical 4x Daily PRN    hydrOXYzine HCL (ATARAX) tablet 50 mg  50 mg Oral Q6H PRN Max 4/day    Or    diphenhydrAMINE (BENADRYL) injection 50 mg  50 mg Intramuscular Q6H PRN    famotidine (PEPCID) tablet 20 mg  20 mg Oral BID    hydrOXYzine HCL (ATARAX) tablet 100 mg  100 mg Oral Q6H PRN Max 4/day    Or    LORazepam (ATIVAN) injection 2 mg  2 mg Intramuscular Q6H PRN    hydrOXYzine HCL (ATARAX) tablet 25 mg  25 mg Oral Q6H PRN Max 4/day    levothyroxine tablet 37.5 mcg  37.5 mcg Oral Early Morning    methocarbamol (ROBAXIN) tablet 500 mg  500 mg Oral Q6H PRN    OLANZapine (ZyPREXA) tablet 5 mg  5 mg Oral Q4H PRN Max 3/day    Or    OLANZapine (ZyPREXA) IM injection 2.5 mg  2.5 mg Intramuscular Q4H PRN Max 3/day    OLANZapine (ZyPREXA) tablet 5 mg  5 mg Oral Q3H PRN Max 3/day    Or    OLANZapine (ZyPREXA) IM injection 5 mg  5 mg Intramuscular Q3H PRN Max 3/day    OLANZapine (ZyPREXA) tablet 2.5 mg  2.5 mg Oral Q4H PRN Max 6/day    polyethylene glycol (MIRALAX) packet 17 g  17 g Oral Daily PRN    propranolol (INDERAL) tablet 10 mg  10 mg Oral Q8H PRN    senna-docusate sodium (SENOKOT S) 8.6-50 mg per tablet 1 tablet  1 tablet Oral Daily PRN    sertraline (ZOLOFT) tablet 150 mg  150 mg Oral Daily    traZODone (DESYREL) tablet 50 mg  50 mg Oral HS PRN   .    Risks, benefits and possible side effects of Medications:   Risks, benefits, and possible side effects of medications explained to patient and patient verbalizes understanding.      Mental Status Evaluation:  Appearance:  age appropriate, casually dressed, and somewhat disheveled possessing appropriate hygiene, marginal grooming   Behavior:  psychomotor retardation, calm and cooperative possessing intermittent eye contact   Speech:  normal pitch and normal volume   Mood:  anxious, depressed, and dysthymic    Affect:  constricted and mood-congruent   Language sparse   Thought Process:  goal directed   Thought Content:  no overt obsessions or delusions   Perceptual Disturbances: None not appearing internally preoccupied or distracted   Risk Potential: Suicidal Ideations none, Homicidal Ideations none, and Potential for Aggression No   Sensorium:  person, place, and time/date   Cognition:  recent and remote memory grossly intact   Consciousness:  alert and awake    Attention: attention span appeared shorter than expected for age   Insight:  limited   Judgment: limited   Intellect Not assessed   Gait/Station: normal gait/station and normal balance   Motor Activity: no abnormal movements     Memory: Short and long term memory  fair     Progress Toward Goals: unchanged, as evidenced by their participation (or lack thereof) in individual, social and therapeutic milieu in addition to adherence to their medication regimen.    Recommended Treatment:   See above for assessment and plan.    Inpatient Psychiatric Certification: Based upon physical, mental and social evaluations, I certify that inpatient psychiatric services are medically necessary for this patient for a duration of greater than 2 midnights for the treatment of MDD (major depressive disorder), recurrent severe, without psychosis (HCC) including psychotropic medication management, participation in the therapeutic milieu and referrals as indicated. Available alternative community resources do not meet the patient's mental health care needs. I further attest that an established written individualized plan of care has been implemented and is outlined in the patient's medical records.    Counseling/Coordination of Care    Total unit time spent today was greater than 15 minutes. Greater than 50% of total time was spent with the patient and/or patient's relatives and/or coordination of patient's care.     Patient's rights, confidentiality, exceptions to confidentiality,  use of electronic medical record including appropriate staff access to medical record regarding behavioral health services and consent to treatment were reviewed.    Note Share:     This note was not shared with the patient due to reasonable likelihood of causing patient harm     This note has been constructed using a voice recognition system.    There may be translation, syntax, or grammatical errors. If you have any questions, please contact the dictating provider.    Jordan Christopher Holter,  07/14/24

## 2024-07-14 NOTE — NURSING NOTE
Franco is pleasant and cooperative. He reports receiving good news regarding his impending group home placement, though he does express elevated anxiety related to this change. He is visible in the milieu, interacting with peers minimally but appropriate and pleasant during interview. His eye contact remains very poor. He is adherent with medications. He is ambulating steadily and independently, dressed in his own attire. He is still sleeping poorly with frequent nocturnal awakenings but he reports feeling well rested and states this is an improvement from before.

## 2024-07-15 PROCEDURE — 99232 SBSQ HOSP IP/OBS MODERATE 35: CPT

## 2024-07-15 RX ADMIN — MELATONIN TAB 3 MG 3 MG: 3 TAB at 21:14

## 2024-07-15 RX ADMIN — CHOLECALCIFEROL TAB 25 MCG (1000 UNIT) 2000 UNITS: 25 TAB at 08:04

## 2024-07-15 RX ADMIN — FAMOTIDINE 20 MG: 20 TABLET ORAL at 17:28

## 2024-07-15 RX ADMIN — FAMOTIDINE 20 MG: 20 TABLET ORAL at 08:04

## 2024-07-15 RX ADMIN — ATORVASTATIN CALCIUM 10 MG: 10 TABLET, FILM COATED ORAL at 17:28

## 2024-07-15 RX ADMIN — CYANOCOBALAMIN TAB 1000 MCG 1000 MCG: 1000 TAB at 08:04

## 2024-07-15 RX ADMIN — SERTRALINE HYDROCHLORIDE 150 MG: 100 TABLET ORAL at 08:04

## 2024-07-15 RX ADMIN — ARIPIPRAZOLE 5 MG: 5 TABLET ORAL at 08:04

## 2024-07-15 RX ADMIN — LEVOTHYROXINE SODIUM 37.5 MCG: 125 TABLET ORAL at 06:53

## 2024-07-15 NOTE — NURSING NOTE
Pt is pleasant, visible on the unit but isolative to self. Denies all psychiatric symptoms, Pt attended and enjoyed evening group. Denies any unmet needs at this time.

## 2024-07-15 NOTE — PROGRESS NOTES
"Progress Note - Behavioral Health   Franco Roberson 39 y.o. male MRN: [id number]  Unit/Bed#: U 344-01 Encounter: [id number]      Assessment & Plan   Principal Problem:    MDD (major depressive disorder), recurrent severe, without psychosis (HCC)  Active Problems:    Unspecified depressive disorder (HCC)    Medical clearance for psychiatric admission    Hyperlipidemia    Vitamin D deficiency    B12 deficiency    Hypothyroidism    Rule Out Autism spectrum disorder      Subjective:  Patient was seen today for continuation of care, records reviewed and patient was discussed with the morning case review team.  Per staff, Franco remains calm and visible in the milieu.  He is meal and medication compliant.  He reported some some difficulty sleeping over the weekend.     Franco was seen today for psychiatric follow-up.  On assessment today, Franco was seen in the hallway away from peers.  Franco reports that he continues to have trouble sleeping at night.  He states he is getting about 3 hours of solid sleep a night.  He reports taking the trazodone on Friday but it made \"my chest feel weird\".  He is agreeable to trying melatonin scheduled tonight to improve sleep.  He states he naps during the day and he was encouraged to try and avoid napping.  He reports that his mood is \"really good\".       Franco denies acute suicidal/self-harm ideation/intent/plan upon direct inquiry at this time.  Franco remains behaviorally appropriate, no agitation or aggression noted on exam or in report.  Franco also denies HI/AH/VH, and does not appear overtly manic nor does he present as internally preoccupied.  No overt delusions or paranoia are verbalized. Franco remains adherent to his current psychotropic medication regimen and denies any side effects from medications, as well as none noted on exam.    Recommended Treatment: Treatment plan and medication changes discussed and per the attending physician the plan is:    1.Continue with group " "therapy, milieu therapy and occupational therapy  2.Behavioral Health checks every 7 minutes  3.Continue frequent safety checks and vitals per unit protocol  4.Continue with SLIM medical management as indicated  5.Adding melatonin 3mg PO daily at HS for insomnia.  Continue Zoloft  150mg PO daily for anxiety/depression and Abilify 5mg PO daily  6.Will review labs in the a.m.  7.Disposition Planning: Discharge planning and efforts remain ongoing, [facility]     Vitals:  Vitals:    07/15/24 1538   BP: 134/72   Pulse: 78   Resp:    Temp: 97.8 °F (36.6 °C)   SpO2: 97%       Laboratory Results:  I have personally reviewed all pertinent laboratory/tests results.  Most Recent Labs:   Lab Results   Component Value Date    WBC 7.01 06/27/2024    RBC 4.54 06/27/2024    HGB 14.5 06/27/2024    HCT 44.4 06/27/2024     06/27/2024    RDW 12.0 06/27/2024    NEUTROABS 4.01 06/27/2024    SODIUM 139 07/10/2024    K 3.8 07/10/2024     07/10/2024    CO2 27 07/10/2024    BUN 16 07/10/2024    CREATININE 0.92 07/10/2024    GLUC 87 07/10/2024    GLUF 87 07/10/2024    CALCIUM 9.3 07/10/2024    AST 28 07/10/2024    ALT 36 07/10/2024    ALKPHOS 66 07/10/2024    TP 7.7 07/10/2024    ALB 4.4 07/10/2024    TBILI 0.55 07/10/2024    CHOLESTEROL 175 07/05/2024    HDL 53 07/05/2024    TRIG 99 07/05/2024    LDLCALC 102 (H) 07/05/2024    NONHDLC 122 07/05/2024    [id number] 5.705 (H) 07/10/2024    FREET4 0.54 (L) 07/10/2024       Psychiatric Review of Systems:  Behavior over the last 24 hours:  unchanged  Sleep: interrupted   Appetite: adequate  Medication side effects: insomnia potentially from increase in Zoloft  ROS: no complaints, denies shortness of breath or chest pain and all other systems are negative for acute changes    Mental Status Evaluation:    Appearance:  disheveled, marginal hygiene, casual shirt and hospital pants    Behavior:  pleasant, cooperative, calm   Speech:  fluent, clear, slow   Mood:  \"Really good\"   Affect:  " constricted   Thought Process:  goal directed, linear, concrete   Associations: concrete associations   Thought Content:  no overt delusions   Perceptual Disturbances: denies auditory or visual hallucinations when asked, does not appear responding to internal stimuli   Risk Potential: Suicidal ideation - None  Homicidal ideation - None  Potential for aggression - Not at present   Sensorium:  oriented to person, place, and time/date   Memory:  recent memory intact   Consciousness:  alert and awake   Attention/Concentration: attention span and concentration are age appropriate   Insight:  limited   Judgment: limited   Gait/Station: normal gait/station   Motor Activity: no abnormal movements     Progress Toward Goals:   This patient is stable and appears to be at baseline, however, requires ongoing hospitalization due to continued poor insight, judgement, and coping that poses a risk to patient in an unsupervised setting. Without medication management in place, patient is likely to decompensate rapidly and become a risk of danger to self or others.      Risk of Harm to Self:   Nursing Suicide Risk Assessment Last 24 hours: C-SSRS Risk (Since Last Contact)  Calculated C-SSRS Risk Score (Since Last Contact): No Risk Indicated  Current Specific Risk Factors include: mental illness diagnosis  Protective Factors: no current suicidal ideation, ability to communicate with staff on the unit, able to contract for safety on the unit  Based on today's assessment, Franco presents the following risk of harm to self: low    Risk of Harm to Others:  Nursing Homicide Risk Assessment: Violence Risk to Others: Denies within past 6 months  Current Specific Risk Factors include: social difficulties  Protective Factors: no current homicidal ideation, compliant with medications on the unit as ordered, compliant with unit milieu, follows staff redirection  Based on today's assessmentFranco presents the following risk of harm to others:  low    The following interventions are recommended: behavioral checks every 7 minutes, continued hospitalization on locked unit        Behavioral Health Medications: all current active meds have been reviewed and continue current psychiatric medications.  Current Facility-Administered Medications   Medication Dose Route Frequency Provider Last Rate    acetaminophen  650 mg Oral Q6H PRN Basilio Brown MD      acetaminophen  650 mg Oral Q4H PRN Basilio Brown MD      acetaminophen  975 mg Oral Q6H PRN Basilio Brown MD      aluminum-magnesium hydroxide-simethicone  30 mL Oral Q4H PRN Basilio Brown MD      ARIPiprazole  5 mg Oral Daily Basilio Panda MD      Artificial Tears  1 drop Both Eyes Q3H PRN Basilio Brown MD      atorvastatin  10 mg Oral Daily With Dinner WALLY Baptiste      benztropine  1 mg Intramuscular Q4H PRN Max 6/day Basilio Brown MD      benztropine  1 mg Oral Q4H PRN Max 6/day Basilio Brown MD      Cholecalciferol  2,000 Units Oral Daily WALLY Baptiste      cyanocobalamin  1,000 mcg Oral Daily WALLY Baptiste      Diclofenac Sodium  2 g Topical 4x Daily PRN WALLY Baptiste      hydrOXYzine HCL  50 mg Oral Q6H PRN Max 4/day Basilio Brown MD      Or    diphenhydrAMINE  50 mg Intramuscular Q6H PRN Basilio Brown MD      famotidine  20 mg Oral BID WALLY Baptiste      hydrOXYzine HCL  100 mg Oral Q6H PRN Max 4/day Basilio Brown MD      Or    LORazepam  2 mg Intramuscular Q6H PRN Basiilo Brown MD      hydrOXYzine HCL  25 mg Oral Q6H PRN Max 4/day Basilio Brown MD      levothyroxine  37.5 mcg Oral Early Morning WALLY Baptiste      melatonin  3 mg Oral HS WALLY Gomez      methocarbamol  500 mg Oral Q6H PRN WALLY Baptiste      OLANZapine  5 mg Oral Q4H PRN Max 3/day Basilio Brown MD      Or    OLANZapine  2.5 mg  Intramuscular Q4H PRN Max 3/day Basilio Brown MD      OLANZapine  5 mg Oral Q3H PRN Max 3/day Basilio Brown MD      Or    OLANZapine  5 mg Intramuscular Q3H PRN Max 3/day Basilio Brown MD      OLANZapine  2.5 mg Oral Q4H PRN Max 6/day Basilio Brown MD      polyethylene glycol  17 g Oral Daily PRN Basilio Brown MD      propranolol  10 mg Oral Q8H PRN Basilio Brown MD      senna-docusate sodium  1 tablet Oral Daily PRN Basilio Brown MD      sertraline  150 mg Oral Daily WALLY Gomez      traZODone  50 mg Oral HS PRN Basilio Brown MD         Risks / Benefits of Treatment:  Risks, benefits, and possible side effects of medications explained to patient and patient verbalizes understanding and agreement for treatment.    Counseling / Coordination of Care:  Patient's progress reviewed with nursing staff.  Medications, treatment progress and treatment plan reviewed with patient.  Supportive counseling provided to the patient.    Total floor/unit time spent today 25 minutes. Greater than 50% of total time was spent with the patient and / or family counseling and / or coordination of care. A description of the counseling / coordination of care: medication education, treatment plan, supportive therapy.

## 2024-07-15 NOTE — NURSING NOTE
Pt is calm and cooperative upon approach. Pt is visible at times and social with select peers. Medication and meal compliant. Denies SI, HI, AH, and VH. Denies any needs at this time.

## 2024-07-15 NOTE — PROGRESS NOTES
07/15/24 0851   Team Meeting   Meeting Type Daily Rounds   Team Members Present   Team Members Present Physician;Nurse;   Physician Team Member Lucero   Nursing Team Member Georges   Care Management Team Member Noa   Patient/Family Present   Patient Present No   Patient's Family Present No     Pt is calm and cooperative. Pt has had some trouble sleeping, Pt was scheduled for trazodone. Pt is medication and meal compliant.

## 2024-07-15 NOTE — SOCIAL WORK
BRANDEN called [facility] to follow up with referral. They reported the worker that is taking care of pt's case is on vacation and will not be back until Monday 7/22/24. They reported Pt is on the wait list for [facility]. Cm inquired on the other housing options, they reported they will send those referrals to those specific programs. [facility] reported they are operating on a wait list.

## 2024-07-16 PROCEDURE — 99232 SBSQ HOSP IP/OBS MODERATE 35: CPT | Performed by: PSYCHIATRY & NEUROLOGY

## 2024-07-16 RX ADMIN — CHOLECALCIFEROL TAB 25 MCG (1000 UNIT) 2000 UNITS: 25 TAB at 08:13

## 2024-07-16 RX ADMIN — ATORVASTATIN CALCIUM 10 MG: 10 TABLET, FILM COATED ORAL at 17:05

## 2024-07-16 RX ADMIN — ARIPIPRAZOLE 5 MG: 5 TABLET ORAL at 08:13

## 2024-07-16 RX ADMIN — LEVOTHYROXINE SODIUM 37.5 MCG: 125 TABLET ORAL at 05:20

## 2024-07-16 RX ADMIN — FAMOTIDINE 20 MG: 20 TABLET ORAL at 08:13

## 2024-07-16 RX ADMIN — MELATONIN TAB 3 MG 3 MG: 3 TAB at 21:17

## 2024-07-16 RX ADMIN — CYANOCOBALAMIN TAB 1000 MCG 1000 MCG: 1000 TAB at 08:13

## 2024-07-16 RX ADMIN — SERTRALINE HYDROCHLORIDE 150 MG: 100 TABLET ORAL at 08:13

## 2024-07-16 RX ADMIN — FAMOTIDINE 20 MG: 20 TABLET ORAL at 17:05

## 2024-07-16 NOTE — PROGRESS NOTES
"Progress Note - Behavioral Health   Franco Roberson 39 y.o. male MRN: [id number]  Unit/Bed#: U 344-01 Encounter: [id number]      Assessment & Plan   Principal Problem:    MDD (major depressive disorder), recurrent severe, without psychosis (HCC)  Active Problems:    Unspecified depressive disorder (HCC)    Medical clearance for psychiatric admission    Hyperlipidemia    Vitamin D deficiency    B12 deficiency    Hypothyroidism    Rule Out Autism spectrum disorder      Subjective:  Patient was seen today for continuation of care, records reviewed and patient was discussed with the morning case review team.  Per staff, Franco remains without behavioral issues.  He has been meal and medication compliant.      Franco was seen today for psychiatric follow-up.  On assessment today, Franco was seen in the hallway away from peers.  Franco states that his sleep improved last night and he got 7 hours of sleep.  He states the melatonin makes him feel a little \"weird\" in the morning, slightly more tired.  He is agreeable to continue melatonin for now because his sleep was improved.  Otherwise he has not complaints.  He continues to report his mood as \"good\" and presents as overall euthymic today.     Franco denies acute suicidal/self-harm ideation/intent/plan upon direct inquiry at this time.  Franco remains behaviorally appropriate, no agitation or aggression noted on exam or in report.  Franco also denies HI/AH/VH, and does not appear overtly manic nor does he present as internally preoccupied.  No overt delusions or paranoia are verbalized. Franco remains adherent to his current psychotropic medication regimen and denies any side effects from medications, as well as none noted on exam.    Recommended Treatment: Treatment plan and medication changes discussed and per the attending physician the plan is:    1.Continue with group therapy, milieu therapy and occupational therapy  2.Behavioral Health checks every 7 " "minutes  3.Continue frequent safety checks and vitals per unit protocol  4.Continue with SLIM medical management as indicated  5.Continue Zoloft  150mg PO daily for anxiety/depression, Abilify 5mg PO daily, and melatonin 3mg PO at HS.   6.Will review labs in the a.m.  7.Disposition Planning: Discharge planning and efforts remain ongoing, [facility]     Vitals:  Vitals:    07/16/24 0741   BP: 120/77   Pulse: 72   Resp:    Temp: (!) 96.8 °F (36 °C)   SpO2: 98%       Laboratory Results:  I have personally reviewed all pertinent laboratory/tests results.  Most Recent Labs:   Lab Results   Component Value Date    WBC 7.01 06/27/2024    RBC 4.54 06/27/2024    HGB 14.5 06/27/2024    HCT 44.4 06/27/2024     06/27/2024    RDW 12.0 06/27/2024    NEUTROABS 4.01 06/27/2024    SODIUM 139 07/10/2024    K 3.8 07/10/2024     07/10/2024    CO2 27 07/10/2024    BUN 16 07/10/2024    CREATININE 0.92 07/10/2024    GLUC 87 07/10/2024    GLUF 87 07/10/2024    CALCIUM 9.3 07/10/2024    AST 28 07/10/2024    ALT 36 07/10/2024    ALKPHOS 66 07/10/2024    TP 7.7 07/10/2024    ALB 4.4 07/10/2024    TBILI 0.55 07/10/2024    CHOLESTEROL 175 07/05/2024    HDL 53 07/05/2024    TRIG 99 07/05/2024    LDLCALC 102 (H) 07/05/2024    NONHDLC 122 07/05/2024    [id number] 5.705 (H) 07/10/2024    FREET4 0.54 (L) 07/10/2024       Psychiatric Review of Systems:  Behavior over the last 24 hours: slowly improving  Sleep: improved 7 hours  Appetite: adequate  Medication side effects: none   ROS: no complaints, denies shortness of breath or chest pain and all other systems are negative for acute changes    Mental Status Evaluation:    Appearance:  disheveled, marginal hygiene, casual shirt and hospital pants    Behavior:  pleasant, cooperative, calm   Speech:  fluent, clear, slow   Mood:  \"Good\"   Affect:  constricted   Thought Process:  goal directed, linear   Associations: concrete associations   Thought Content:  no overt delusions "   Perceptual Disturbances: denies auditory or visual hallucinations when asked, does not appear responding to internal stimuli   Risk Potential: Suicidal ideation - None  Homicidal ideation - None  Potential for aggression - Not at present   Sensorium:  oriented to person, place, and time/date   Memory:  recent memory intact   Consciousness:  alert and awake   Attention/Concentration: attention span and concentration are age appropriate   Insight:  limited   Judgment: limited   Gait/Station: normal gait/station   Motor Activity: no abnormal movements     Progress Toward Goals:   This patient is stable and appears to be at baseline, however, requires ongoing hospitalization due to continued poor insight, judgement, and coping that poses a risk to patient in an unsupervised setting. Without medication management in place, patient is likely to decompensate rapidly and become a risk of danger to self or others.      Risk of Harm to Self:   Nursing Suicide Risk Assessment Last 24 hours: C-SSRS Risk (Since Last Contact)  Calculated C-SSRS Risk Score (Since Last Contact): No Risk Indicated  Current Specific Risk Factors include: mental illness diagnosis  Protective Factors: no current suicidal ideation, ability to communicate with staff on the unit, able to contract for safety on the unit  Based on today's assessment, Franco presents the following risk of harm to self: low    Risk of Harm to Others:  Nursing Homicide Risk Assessment: Violence Risk to Others: Denies within past 6 months  Current Specific Risk Factors include: social difficulties  Protective Factors: no current homicidal ideation, compliant with medications on the unit as ordered, compliant with unit milieu, follows staff redirection  Based on today's assessment, Franco presents the following risk of harm to others: low    The following interventions are recommended: behavioral checks every 7 minutes, continued hospitalization on locked  unit        Behavioral Health Medications: all current active meds have been reviewed and continue current psychiatric medications.  Current Facility-Administered Medications   Medication Dose Route Frequency Provider Last Rate    acetaminophen  650 mg Oral Q6H PRN Basilio Brown MD      acetaminophen  650 mg Oral Q4H PRN Basilio Brown MD      acetaminophen  975 mg Oral Q6H PRN Basilio Brown MD      aluminum-magnesium hydroxide-simethicone  30 mL Oral Q4H PRN Basilio Brown MD      ARIPiprazole  5 mg Oral Daily Basilio Panda MD      Artificial Tears  1 drop Both Eyes Q3H PRN Basilio Brown MD      atorvastatin  10 mg Oral Daily With Dinner WALLY Baptiste      benztropine  1 mg Intramuscular Q4H PRN Max 6/day Basilio Brown MD      benztropine  1 mg Oral Q4H PRN Max 6/day Basilio Brown MD      Cholecalciferol  2,000 Units Oral Daily WALLY Baptiste      cyanocobalamin  1,000 mcg Oral Daily WALLY Baptiste      Diclofenac Sodium  2 g Topical 4x Daily PRN WALLY Baptiste      hydrOXYzine HCL  50 mg Oral Q6H PRN Max 4/day Basilio Brown MD      Or    diphenhydrAMINE  50 mg Intramuscular Q6H PRN Basilio Brown MD      famotidine  20 mg Oral BID WALLY Baptiste      hydrOXYzine HCL  100 mg Oral Q6H PRN Max 4/day Basilio Brown MD      Or    LORazepam  2 mg Intramuscular Q6H PRN Basilio Brown MD      hydrOXYzine HCL  25 mg Oral Q6H PRN Max 4/day Basilio Brown MD      levothyroxine  37.5 mcg Oral Early Morning WALLY Baptiste      melatonin  3 mg Oral HS WALLY Gomez      methocarbamol  500 mg Oral Q6H PRN WALLY Baptiste      OLANZapine  5 mg Oral Q4H PRN Max 3/day Basilio Brown MD      Or    OLANZapine  2.5 mg Intramuscular Q4H PRN Max 3/day Basilio Brown MD      OLANZapine  5 mg Oral Q3H PRN Max 3/day Basilio Brown MD      Or     OLANZapine  5 mg Intramuscular Q3H PRN Max 3/day Basilio Brown MD      OLANZapine  2.5 mg Oral Q4H PRN Max 6/day Basilio Brown MD      polyethylene glycol  17 g Oral Daily PRN Basilio Brown MD      propranolol  10 mg Oral Q8H PRN Basilio Brown MD      senna-docusate sodium  1 tablet Oral Daily PRN Basilio Brown MD      sertraline  150 mg Oral Daily WALLY Gomez      traZODone  50 mg Oral HS PRN Basilio Brown MD         Risks / Benefits of Treatment:  Risks, benefits, and possible side effects of medications explained to patient and patient verbalizes understanding and agreement for treatment.    Counseling / Coordination of Care:  Patient's progress reviewed with nursing staff.  Medications, treatment progress and treatment plan reviewed with patient.  Supportive counseling provided to the patient.    Total floor/unit time spent today 25 minutes. Greater than 50% of total time was spent with the patient and / or family counseling and / or coordination of care. A description of the counseling / coordination of care: medication education, treatment plan, supportive therapy.

## 2024-07-16 NOTE — PROGRESS NOTES
07/16/24 0849   Team Meeting   Meeting Type Tx Team Meeting   Team Members Present   Team Members Present Physician;Nurse;   Physician Team Member Lucero   Nursing Team Member [facility]   Care Management Team Member Noa   Patient/Family Present   Patient Present No   Patient's Family Present No     Pt is calm  and cooperative. Pt reported he will try the melatonin. Pt is medication and meal compliant. Pt's discharge is pending group home placement.

## 2024-07-16 NOTE — NURSING NOTE
"Patient has been in his room most of the evening but occasionally comes into the milieu. He was accepting of the Melatonin which is now scheduled at HS \"I'll give it a try.\"  He denies S.I.H.I.A/H V/H  "

## 2024-07-16 NOTE — NURSING NOTE
Pt is calm and cooperative upon approach, visible and social with select peers. Medication and meal compliant. Denies SI, HI, AH, and VH. Denies any needs at this time.

## 2024-07-16 NOTE — PLAN OF CARE
Problem: Depression  Goal: Verbalize thoughts and feelings  Description: Interventions:  - Assess and re-assess patient's level of risk   - Engage patient in 1:1 interactions, daily, for a minimum of 15 minutes   - Encourage patient to express feelings, fears, frustrations, hopes   Outcome: Progressing  Goal: Refrain from isolation  Description: Interventions:  - Develop a trusting relationship   - Encourage socialization   Outcome: Progressing     Problem: Ineffective Coping  Goal: Participates in unit activities  Description: Interventions:  - Provide therapeutic environment   - Provide required programming   - Redirect inappropriate behaviors   Outcome: Not Progressing

## 2024-07-17 PROCEDURE — 99232 SBSQ HOSP IP/OBS MODERATE 35: CPT

## 2024-07-17 RX ADMIN — FAMOTIDINE 20 MG: 20 TABLET ORAL at 08:04

## 2024-07-17 RX ADMIN — FAMOTIDINE 20 MG: 20 TABLET ORAL at 17:19

## 2024-07-17 RX ADMIN — SERTRALINE HYDROCHLORIDE 150 MG: 100 TABLET ORAL at 08:04

## 2024-07-17 RX ADMIN — MELATONIN TAB 3 MG 3 MG: 3 TAB at 21:14

## 2024-07-17 RX ADMIN — CYANOCOBALAMIN TAB 1000 MCG 1000 MCG: 1000 TAB at 08:04

## 2024-07-17 RX ADMIN — CHOLECALCIFEROL TAB 25 MCG (1000 UNIT) 2000 UNITS: 25 TAB at 08:04

## 2024-07-17 RX ADMIN — LEVOTHYROXINE SODIUM 37.5 MCG: 125 TABLET ORAL at 06:21

## 2024-07-17 RX ADMIN — ARIPIPRAZOLE 5 MG: 5 TABLET ORAL at 08:04

## 2024-07-17 RX ADMIN — ATORVASTATIN CALCIUM 10 MG: 10 TABLET, FILM COATED ORAL at 17:19

## 2024-07-17 NOTE — PROGRESS NOTES
"Progress Note - Behavioral Health   Franco Roberson 39 y.o. male MRN: [id number]  Unit/Bed#: [facility] 344-01 Encounter: [id number]      Assessment & Plan   Principal Problem:    MDD (major depressive disorder), recurrent severe, without psychosis (HCC)  Active Problems:    Unspecified depressive disorder (HCC)    Medical clearance for psychiatric admission    Hyperlipidemia    Vitamin D deficiency    B12 deficiency    Hypothyroidism    Rule Out Autism spectrum disorder      Subjective:  Patient was seen today for continuation of care, records reviewed and patient was discussed with the morning case review team.  Per staff, Franco continues to be visible on the unit but sometimes withdrawn to self.  He is regularly attending groups and participating.        Franco was seen today for psychiatric follow-up.  On assessment today, Franco was seen in the hallway away from peers.  Franco states he felt less tired today than yesterday after taking the melatonin.  He is agreeable to continue with melatonin for now as he is sleeping 6-7 hours a night now.  He feels his roommate snoring is the main cause of his insomnia.  Otherwise he feels his mood is \"good\" and has no other complaints.  He reports he showered 2 days ago, he is malodorous today and he was encouraged to take regular showers.    Franco denies acute suicidal/self-harm ideation/intent/plan upon direct inquiry at this time.  Franco remains behaviorally appropriate, no agitation or aggression noted on exam or in report.  Franco also denies HI/AH/VH, and does not appear overtly manic nor does he present as internally preoccupied.  No overt delusions or paranoia are verbalized. Franco remains adherent to his current psychotropic medication regimen and denies any side effects from medications, as well as none noted on exam.    Recommended Treatment: Treatment plan and medication changes discussed and per the attending physician the plan is:    1.Continue with group therapy, " "milieu therapy and occupational therapy  2.Behavioral Health checks every 7 minutes  3.Continue frequent safety checks and vitals per unit protocol  4.Continue with SLIM medical management as indicated  5.Continue Zoloft  150mg PO daily for anxiety/depression, Abilify 5mg PO daily, and melatonin 3mg PO at HS.   6.Will review labs in the a.m.  7.Disposition Planning: Discharge planning and efforts remain ongoing, Rossy Avila     Vitals:  Vitals:    07/17/24 0722   BP: 113/71   Pulse: 63   Resp: 16   Temp: (!) 97.2 °F (36.2 °C)   SpO2: 98%       Laboratory Results:  I have personally reviewed all pertinent laboratory/tests results.  Most Recent Labs:   Lab Results   Component Value Date    WBC 7.01 06/27/2024    RBC 4.54 06/27/2024    HGB 14.5 06/27/2024    HCT 44.4 06/27/2024     06/27/2024    RDW 12.0 06/27/2024    NEUTROABS 4.01 06/27/2024    SODIUM 139 07/10/2024    K 3.8 07/10/2024     07/10/2024    CO2 27 07/10/2024    BUN 16 07/10/2024    CREATININE 0.92 07/10/2024    GLUC 87 07/10/2024    GLUF 87 07/10/2024    CALCIUM 9.3 07/10/2024    AST 28 07/10/2024    ALT 36 07/10/2024    ALKPHOS 66 07/10/2024    TP 7.7 07/10/2024    ALB 4.4 07/10/2024    TBILI 0.55 07/10/2024    CHOLESTEROL 175 07/05/2024    HDL 53 07/05/2024    TRIG 99 07/05/2024    LDLCALC 102 (H) 07/05/2024    NONHDLC 122 07/05/2024    [id number] 5.705 (H) 07/10/2024    FREET4 0.54 (L) 07/10/2024       Psychiatric Review of Systems:  Behavior over the last 24 hours: slowly improving  Sleep:6 hours  Appetite: adequate  Medication side effects: none   ROS: no complaints, denies shortness of breath or chest pain and all other systems are negative for acute changes    Mental Status Evaluation:    Appearance:  disheveled, poor hygiene, casual shirt and hospital pants    Behavior:  pleasant, cooperative, calm   Speech:  fluent, clear, slow   Mood:  \"Good\"   Affect:  constricted   Thought Process:  goal directed, linear, concrete "   Associations: concrete associations   Thought Content:  no overt delusions   Perceptual Disturbances: denies auditory or visual hallucinations when asked, does not appear responding to internal stimuli   Risk Potential: Suicidal ideation - None  Homicidal ideation - None  Potential for aggression - Not at present   Sensorium:  oriented to person, place, and time/date   Memory:  recent memory intact   Consciousness:  alert and awake   Attention/Concentration: attention span and concentration are age appropriate   Insight:  limited   Judgment: limited   Gait/Station: normal gait/station   Motor Activity: no abnormal movements     Progress Toward Goals:   This patient is stable and appears to be at baseline, however, requires ongoing hospitalization due to continued poor insight, judgement, and coping that poses a risk to patient in an unsupervised setting. Without medication management in place, patient is likely to decompensate rapidly and become a risk of danger to self or others.      Risk of Harm to Self:   Nursing Suicide Risk Assessment Last 24 hours: C-SSRS Risk (Since Last Contact)  Calculated C-SSRS Risk Score (Since Last Contact): No Risk Indicated  Current Specific Risk Factors include: mental illness diagnosis  Protective Factors: no current suicidal ideation, ability to communicate with staff on the unit, able to contract for safety on the unit  Based on today's assessment, Franco presents the following risk of harm to self: low    Risk of Harm to Others:  Nursing Homicide Risk Assessment: Violence Risk to Others: Denies within past 6 months  Current Specific Risk Factors include: social difficulties  Protective Factors: no current homicidal ideation, compliant with medications on the unit as ordered, compliant with unit milieu, follows staff redirection  Based on today's assessment, Franco presents the following risk of harm to others: low    The following interventions are recommended: behavioral  checks every 7 minutes, continued hospitalization on locked unit        Behavioral Health Medications: all current active meds have been reviewed and continue current psychiatric medications.  Current Facility-Administered Medications   Medication Dose Route Frequency Provider Last Rate    acetaminophen  650 mg Oral Q6H PRN Basilio Brown MD      acetaminophen  650 mg Oral Q4H PRN Basilio Brown MD      acetaminophen  975 mg Oral Q6H PRN Basilio Brown MD      aluminum-magnesium hydroxide-simethicone  30 mL Oral Q4H PRN Basilio Brown MD      ARIPiprazole  5 mg Oral Daily Basilio Panda MD      Artificial Tears  1 drop Both Eyes Q3H PRN Basilio Brown MD      atorvastatin  10 mg Oral Daily With Dinner WALLY Baptiste      benztropine  1 mg Intramuscular Q4H PRN Max 6/day Basilio Brown MD      benztropine  1 mg Oral Q4H PRN Max 6/day Basilio Brown MD      Cholecalciferol  2,000 Units Oral Daily WALLY Baptiste      cyanocobalamin  1,000 mcg Oral Daily WALLY Baptiste      Diclofenac Sodium  2 g Topical 4x Daily PRN WALLY Baptiste      hydrOXYzine HCL  50 mg Oral Q6H PRN Max 4/day Basilio Brown MD      Or    diphenhydrAMINE  50 mg Intramuscular Q6H PRN Basilio Brown MD      famotidine  20 mg Oral BID WALLY Baptiste      hydrOXYzine HCL  100 mg Oral Q6H PRN Max 4/day Basilio Brown MD      Or    LORazepam  2 mg Intramuscular Q6H PRN Basilio Brown MD      hydrOXYzine HCL  25 mg Oral Q6H PRN Max 4/day Basilio Brown MD      levothyroxine  37.5 mcg Oral Early Morning WALLY Baptiste      melatonin  3 mg Oral HS WALLY Gomez      methocarbamol  500 mg Oral Q6H PRN WALLY Baptiste      OLANZapine  5 mg Oral Q4H PRN Max 3/day Basilio Brown MD      Or    OLANZapine  2.5 mg Intramuscular Q4H PRN Max 3/day Basilio Brown MD      OLANZapine   5 mg Oral Q3H PRN Max 3/day Basilio Brown MD      Or    OLANZapine  5 mg Intramuscular Q3H PRN Max 3/day Basilio Brown MD      OLANZapine  2.5 mg Oral Q4H PRN Max 6/day Basilio Brown MD      polyethylene glycol  17 g Oral Daily PRN Basilio Brown MD      propranolol  10 mg Oral Q8H PRN Basilio Brown MD      senna-docusate sodium  1 tablet Oral Daily PRN Basilio Brown MD      sertraline  150 mg Oral Daily WALLY Gomez      traZODone  50 mg Oral HS PRN Basilio Brown MD         Risks / Benefits of Treatment:  Risks, benefits, and possible side effects of medications explained to patient and patient verbalizes understanding and agreement for treatment.    Counseling / Coordination of Care:  Patient's progress reviewed with nursing staff.  Medications, treatment progress and treatment plan reviewed with patient.  Supportive counseling provided to the patient.    Total floor/unit time spent today 25 minutes. Greater than 50% of total time was spent with the patient and / or family counseling and / or coordination of care. A description of the counseling / coordination of care: medication education, treatment plan, supportive therapy.

## 2024-07-17 NOTE — NURSING NOTE
Pt is visible on the unit but withdrawn to himself. Medication and meal compliant. Reports he still feels a little groggy from the melatonin but not has mush as the previous day. Encouraged to attend groups. Pt is hopeful for discharge. Denying SI/HI/AH/VH at this time. Denies any unmet needs or complaints.

## 2024-07-17 NOTE — PLAN OF CARE
Pt regularly attending groups and participates. Pt feels ready to continue with his progress outside of the hospital, but does report he can be patient for his placement.

## 2024-07-17 NOTE — PLAN OF CARE
Problem: Ineffective Coping  Goal: Identifies ineffective coping skills  Outcome: Progressing  Goal: Identifies healthy coping skills  Outcome: Progressing  Goal: Demonstrates healthy coping skills  Outcome: Progressing  Goal: Participates in unit activities  Description: Interventions:  - Provide therapeutic environment   - Provide required programming   - Redirect inappropriate behaviors   Outcome: Progressing     Problem: Risk for Self Injury/Neglect  Goal: Treatment Goal: Remain safe during length of stay, learn and adopt new coping skills, and be free of self-injurious ideation, impulses and acts at the time of discharge  Outcome: Progressing     Problem: Depression  Goal: Treatment Goal: Demonstrate behavioral control of depressive symptoms, verbalize feelings of improved mood/affect, and adopt new coping skills prior to discharge  Outcome: Progressing  Goal: Verbalize thoughts and feelings  Description: Interventions:  - Assess and re-assess patient's level of risk   - Engage patient in 1:1 interactions, daily, for a minimum of 15 minutes   - Encourage patient to express feelings, fears, frustrations, hopes   Outcome: Progressing  Goal: Refrain from harming self  Description: Interventions:  - Monitor patient closely, per order   - Supervise medication ingestion, monitor effects and side effects   Outcome: Progressing  Goal: Refrain from isolation  Description: Interventions:  - Develop a trusting relationship   - Encourage socialization   Outcome: Progressing  Goal: Refrain from self-neglect  Outcome: Progressing  Goal: Attend and participate in unit activities, including therapeutic, recreational, and educational groups  Description: Interventions:  - Provide therapeutic and educational activities daily, encourage attendance and participation, and document same in the medical record   Outcome: Progressing  Goal: Complete daily ADLs, including personal hygiene independently, as able  Description:  Interventions:  - Observe, teach, and assist patient with ADLS  -  Monitor and promote a balance of rest/activity, with adequate nutrition and elimination   Outcome: Progressing     Problem: Anxiety  Goal: Anxiety is at manageable level  Description: Interventions:  - Assess and monitor patient's anxiety level.   - Monitor for signs and symptoms (heart palpitations, chest pain, shortness of breath, headaches, nausea, feeling jumpy, restlessness, irritable, apprehensive).   - Collaborate with interdisciplinary team and initiate plan and interventions as ordered.  - [place] patient to unit/surroundings  - Explain treatment plan  - Encourage participation in care  - Encourage verbalization of concerns/fears  - Identify coping mechanisms  - Assist in developing anxiety-reducing skills  - Administer/offer alternative therapies  - Limit or eliminate stimulants  Outcome: Progressing     Problem: DISCHARGE PLANNING - CARE MANAGEMENT  Goal: Discharge to post-acute care or home with appropriate resources  Description: INTERVENTIONS:  - Conduct assessment to determine patient/family and health care team treatment goals, and need for post-acute services based on payer coverage, community resources, and patient preferences, and barriers to discharge  - Address psychosocial, clinical, and financial barriers to discharge as identified in assessment in conjunction with the patient/family and health care team  - Arrange appropriate level of post-acute services according to patient’s   needs and preference and payer coverage in collaboration with the physician and health care team  - Communicate with and update the patient/family, physician, and health care team regarding progress on the discharge plan  - Arrange appropriate transportation to post-acute venues  Outcome: Progressing     Problem: Knowledge Deficit  Goal: Patient/family/caregiver demonstrates understanding of disease process, treatment plan, medications, and discharge  instructions  Description: Complete learning assessment and assess knowledge base.  Interventions:  - Provide teaching at level of understanding  - Provide teaching via preferred learning methods  Outcome: Progressing     Problem: SLEEP DISTURBANCE  Goal: Will exhibit normal sleeping pattern  Description: Interventions:  -  Assess the patients sleep pattern, noting recent changes  - Administer medication as ordered  - Decrease environmental stimuli, including noise, as appropriate during the night  - Encourage the patient to actively participate in unit groups and or exercise during the day to enhance ability to achieve adequate sleep at night  - Assess the patient, in the morning, encouraging a description of sleep experience  Outcome: Progressing

## 2024-07-17 NOTE — PROGRESS NOTES
07/17/24 0854   Team Meeting   Meeting Type Daily Rounds   Team Members Present   Team Members Present Physician;Nurse;   Physician Team Member Lucero   [facility]   Care Management Team Member Suhas   Patient/Family Present   Patient Present No   Patient's Family Present No     Pt med/meal compliant. Reporting drowsiness associated with Trazodone. Discharge pending placement.

## 2024-07-18 PROCEDURE — 99232 SBSQ HOSP IP/OBS MODERATE 35: CPT

## 2024-07-18 RX ADMIN — LEVOTHYROXINE SODIUM 37.5 MCG: 125 TABLET ORAL at 06:54

## 2024-07-18 RX ADMIN — ATORVASTATIN CALCIUM 10 MG: 10 TABLET, FILM COATED ORAL at 17:30

## 2024-07-18 RX ADMIN — ARIPIPRAZOLE 5 MG: 5 TABLET ORAL at 08:12

## 2024-07-18 RX ADMIN — FAMOTIDINE 20 MG: 20 TABLET ORAL at 08:12

## 2024-07-18 RX ADMIN — CYANOCOBALAMIN TAB 1000 MCG 1000 MCG: 1000 TAB at 08:12

## 2024-07-18 RX ADMIN — MELATONIN TAB 3 MG 3 MG: 3 TAB at 21:08

## 2024-07-18 RX ADMIN — CHOLECALCIFEROL TAB 25 MCG (1000 UNIT) 2000 UNITS: 25 TAB at 08:11

## 2024-07-18 RX ADMIN — SERTRALINE HYDROCHLORIDE 150 MG: 100 TABLET ORAL at 08:11

## 2024-07-18 RX ADMIN — FAMOTIDINE 20 MG: 20 TABLET ORAL at 17:41

## 2024-07-18 NOTE — NURSING NOTE
Pt denies SI/HI/AH/VH. Present in dayroom and milieu. Medication and meal compliant. Selectively social with peers but is more social with staff. Pt appears withdrawn. Pt is calm and pleasant. Compliant with Lunch. No further concerns as of present. Plan of care ongoing.

## 2024-07-18 NOTE — PROGRESS NOTES
"Progress Note - Behavioral Health   Franco Roberson 39 y.o. male MRN: [id number]  Unit/Bed#: [facility] 344-01 Encounter: [id number]      Assessment & Plan   Principal Problem:    MDD (major depressive disorder), recurrent severe, without psychosis (HCC)  Active Problems:    Unspecified depressive disorder (HCC)    Medical clearance for psychiatric admission    Hyperlipidemia    Vitamin D deficiency    B12 deficiency    Hypothyroidism    Rule Out Autism spectrum disorder      Subjective:  Patient was seen today for continuation of care, records reviewed and patient was discussed with the morning case review team.  Per staff, Franco continues to be calm and cooperative.  He is meal and medication compliant.    Franco was seen today for psychiatric follow-up.  On assessment today, Franco was seen in the hallway away from peers.  Franco reports that he is not groggy in the morning now with melatonin.  He continues to wake up 2-3 times per night but this is because of his roommates snoring.  He continues to be appear disheveled and malodorous. He continues to deny and anxiety or depression and reports his mood is \"good\"    Franco denies acute suicidal/self-harm ideation/intent/plan upon direct inquiry at this time.  Franco remains behaviorally appropriate, no agitation or aggression noted on exam or in report.  Franco also denies HI/AH/VH, and does not appear overtly manic nor does he present as internally preoccupied.  No overt delusions or paranoia are verbalized. Franco remains adherent to his current psychotropic medication regimen and denies any side effects from medications, as well as none noted on exam.    Recommended Treatment: Treatment plan and medication changes discussed and per the attending physician the plan is:    1.Continue with group therapy, milieu therapy and occupational therapy  2.Behavioral Health checks every 7 minutes  3.Continue frequent safety checks and vitals per unit protocol  4.Continue with SLIM " "medical management as indicated  5.Continue Zoloft  150mg PO daily for anxiety/depression, Abilify 5mg PO daily, and melatonin 3mg PO at HS.   6.Will review labs in the a.m.  7.Disposition Planning: Discharge planning and efforts remain ongoing, Rossy Avila     Vitals:  Vitals:    07/18/24 0722   BP: 118/55   Pulse: 57   Resp:    Temp: (!) 96.3 °F (35.7 °C)   SpO2: 97%       Laboratory Results:  I have personally reviewed all pertinent laboratory/tests results.  Most Recent Labs:   Lab Results   Component Value Date    WBC 7.01 06/27/2024    RBC 4.54 06/27/2024    HGB 14.5 06/27/2024    HCT 44.4 06/27/2024     06/27/2024    RDW 12.0 06/27/2024    NEUTROABS 4.01 06/27/2024    SODIUM 139 07/10/2024    K 3.8 07/10/2024     07/10/2024    CO2 27 07/10/2024    BUN 16 07/10/2024    CREATININE 0.92 07/10/2024    GLUC 87 07/10/2024    GLUF 87 07/10/2024    CALCIUM 9.3 07/10/2024    AST 28 07/10/2024    ALT 36 07/10/2024    ALKPHOS 66 07/10/2024    TP 7.7 07/10/2024    ALB 4.4 07/10/2024    TBILI 0.55 07/10/2024    CHOLESTEROL 175 07/05/2024    HDL 53 07/05/2024    TRIG 99 07/05/2024    LDLCALC 102 (H) 07/05/2024    NONHDLC 122 07/05/2024    [id number] 5.705 (H) 07/10/2024    FREET4 0.54 (L) 07/10/2024       Psychiatric Review of Systems:  Behavior over the last 24 hours: slowly improving  Sleep:6-7 hours  Appetite: adequate  Medication side effects: none   ROS: no complaints, denies shortness of breath or chest pain and all other systems are negative for acute changes    Mental Status Evaluation:    Appearance:  disheveled, poor hygiene, casual shirt and hospital pants    Behavior:  cooperative, calm   Speech:  fluent, clear, slow   Mood:  \"Good\"   Affect:  constricted   Thought Process:  goal directed, linear, concrete   Associations: concrete associations   Thought Content:  no overt delusions   Perceptual Disturbances: denies auditory or visual hallucinations when asked, does not appear responding to " internal stimuli   Risk Potential: Suicidal ideation - None  Homicidal ideation - None  Potential for aggression - Not at present   Sensorium:  oriented to person, place, and time/date   Memory:  recent memory intact   Consciousness:  alert and awake   Attention/Concentration: attention span and concentration are age appropriate   Insight:  limited   Judgment: limited   Gait/Station: normal gait/station   Motor Activity: no abnormal movements     Progress Toward Goals:   This patient is stable and appears to be at baseline, however, requires ongoing hospitalization due to continued poor insight, judgement, and coping that poses a risk to patient in an unsupervised setting. Without medication management in place, patient is likely to decompensate rapidly and become a risk of danger to self or others.      Risk of Harm to Self:   Nursing Suicide Risk Assessment Last 24 hours: C-SSRS Risk (Since Last Contact)  Calculated C-SSRS Risk Score (Since Last Contact): No Risk Indicated  Current Specific Risk Factors include: mental illness diagnosis  Protective Factors: no current suicidal ideation, ability to communicate with staff on the unit, able to contract for safety on the unit  Based on today's assessment, Franco presents the following risk of harm to self: low    Risk of Harm to Others:  Nursing Homicide Risk Assessment: Violence Risk to Others: Denies within past 6 months  Current Specific Risk Factors include: social difficulties  Protective Factors: no current homicidal ideation, compliant with medications on the unit as ordered, compliant with unit milieu, follows staff redirection  Based on today's assessment, Franco presents the following risk of harm to others: low    The following interventions are recommended: behavioral checks every 7 minutes, continued hospitalization on locked unit        Behavioral Health Medications: all current active meds have been reviewed and continue current psychiatric  medications.  Current Facility-Administered Medications   Medication Dose Route Frequency Provider Last Rate    acetaminophen  650 mg Oral Q6H PRN Basilio Brown MD      acetaminophen  650 mg Oral Q4H PRN Basilio Brown MD      acetaminophen  975 mg Oral Q6H PRN Basilio Brown MD      aluminum-magnesium hydroxide-simethicone  30 mL Oral Q4H PRN Basilio Brown MD      ARIPiprazole  5 mg Oral Daily Basilio Panda MD      Artificial Tears  1 drop Both Eyes Q3H PRN Basilio Brown MD      atorvastatin  10 mg Oral Daily With Dinner WALLY Baptiste      benztropine  1 mg Intramuscular Q4H PRN Max 6/day Basilio Brown MD      benztropine  1 mg Oral Q4H PRN Max 6/day Basilio Brown MD      Cholecalciferol  2,000 Units Oral Daily WALLY Baptiste      cyanocobalamin  1,000 mcg Oral Daily WALLY Baptiste      Diclofenac Sodium  2 g Topical 4x Daily PRN WALLY Baptiste      hydrOXYzine HCL  50 mg Oral Q6H PRN Max 4/day Basilio Brown MD      Or    diphenhydrAMINE  50 mg Intramuscular Q6H PRN Basilio Brown MD      famotidine  20 mg Oral BID WALLY Baptiste      hydrOXYzine HCL  100 mg Oral Q6H PRN Max 4/day Basilio Brown MD      Or    LORazepam  2 mg Intramuscular Q6H PRN Basilio Brown MD      hydrOXYzine HCL  25 mg Oral Q6H PRN Max 4/day Basilio Brown MD      levothyroxine  37.5 mcg Oral Early Morning WALLY Baptiste      melatonin  3 mg Oral HS WALLY Gomez      methocarbamol  500 mg Oral Q6H PRN WALLY Baptiste      OLANZapine  5 mg Oral Q4H PRN Max 3/day Basilio Brown MD      Or    OLANZapine  2.5 mg Intramuscular Q4H PRN Max 3/day Basilio Brown MD      OLANZapine  5 mg Oral Q3H PRN Max 3/day Basilio Brown MD      Or    OLANZapine  5 mg Intramuscular Q3H PRN Max 3/day Basilio Brown MD      OLANZapine  2.5 mg Oral Q4H PRN Max  6/day Basilio Brown MD      polyethylene glycol  17 g Oral Daily PRN Basilio Brown MD      propranolol  10 mg Oral Q8H PRN Basilio Brown MD      senna-docusate sodium  1 tablet Oral Daily PRN Basilio Brown MD      sertraline  150 mg Oral Daily WALLY Gomez      traZODone  50 mg Oral HS PRN Basilio Brown MD         Risks / Benefits of Treatment:  Risks, benefits, and possible side effects of medications explained to patient and patient verbalizes understanding and agreement for treatment.    Counseling / Coordination of Care:  Patient's progress reviewed with nursing staff.  Medications, treatment progress and treatment plan reviewed with patient.  Supportive counseling provided to the patient.    Total floor/unit time spent today 25 minutes. Greater than 50% of total time was spent with the patient and / or family counseling and / or coordination of care. A description of the counseling / coordination of care: medication education, treatment plan, supportive therapy.

## 2024-07-18 NOTE — NURSING NOTE
He offered the information this evening that the T shirt he always wears, which depicts wolves, belonged to his mother. It seems to be important for him to wear it.  He was cooperative with HS medication and he denies S.I.H.I.A/H V/H.

## 2024-07-18 NOTE — PROGRESS NOTES
07/18/24 0903   Team Meeting   Meeting Type Daily Rounds   Team Members Present   Team Members Present Physician;Nurse;   Physician Team Member Gisela   Nursing Team Member Jeri   Care Management Team Member Suhas   Patient/Family Present   Patient Present No   Patient's Family Present No     Pt med/meal compliant. Visible on the unit. Pt declining to wash clothing, reporting his shirt belonged to his mother. Discharge pending placement.

## 2024-07-19 PROCEDURE — 99232 SBSQ HOSP IP/OBS MODERATE 35: CPT

## 2024-07-19 RX ADMIN — FAMOTIDINE 20 MG: 20 TABLET ORAL at 09:11

## 2024-07-19 RX ADMIN — ARIPIPRAZOLE 5 MG: 5 TABLET ORAL at 09:11

## 2024-07-19 RX ADMIN — LEVOTHYROXINE SODIUM 37.5 MCG: 125 TABLET ORAL at 05:53

## 2024-07-19 RX ADMIN — ATORVASTATIN CALCIUM 10 MG: 10 TABLET, FILM COATED ORAL at 17:26

## 2024-07-19 RX ADMIN — FAMOTIDINE 20 MG: 20 TABLET ORAL at 17:26

## 2024-07-19 RX ADMIN — SERTRALINE HYDROCHLORIDE 150 MG: 100 TABLET ORAL at 09:11

## 2024-07-19 RX ADMIN — CHOLECALCIFEROL TAB 25 MCG (1000 UNIT) 2000 UNITS: 25 TAB at 09:11

## 2024-07-19 RX ADMIN — MELATONIN TAB 3 MG 6 MG: 3 TAB at 22:59

## 2024-07-19 RX ADMIN — CYANOCOBALAMIN TAB 1000 MCG 1000 MCG: 1000 TAB at 09:11

## 2024-07-19 NOTE — NURSING NOTE
"Patient was visible out of their room this morning. Reports breakfast was \"good\" and they woke up twice last night but feel \"well-rested\". Reports their mood is \"very good\". Denies depression, anxiety, SI, HI, and hallucinations of any kind. Medication compliant. Denies any unmet needs or concerns at this time.   "

## 2024-07-19 NOTE — PROGRESS NOTES
07/19/24 0903   Team Meeting   Meeting Type Daily Rounds   Team Members Present   Team Members Present Physician;Nurse;   Physician Team Member Lucero   Nursing Team Member Erick   Care Management Team Member Suhas   Patient/Family Present   Patient Present No   Patient's Family Present No     Pt med/meal compliant. Visible on the unit, social with select peers. Discharge pending placement.

## 2024-07-19 NOTE — NURSING NOTE
Pt became more visible after diner, walking in the hallways.  Pt is wearing the same t-shirt as yesterday.  Pt denies HI/SI/AVH and is compliant with scheduled medications and meals.  Pt is pleasant,  cooperative,  and has not reported unmet needs at this time.

## 2024-07-19 NOTE — PLAN OF CARE
Problem: Ineffective Coping  Goal: Identifies ineffective coping skills  Outcome: Progressing  Goal: Identifies healthy coping skills  Outcome: Progressing  Goal: Demonstrates healthy coping skills  Outcome: Progressing  Goal: Participates in unit activities  Description: Interventions:  - Provide therapeutic environment   - Provide required programming   - Redirect inappropriate behaviors   Outcome: Progressing     Problem: Risk for Self Injury/Neglect  Goal: Treatment Goal: Remain safe during length of stay, learn and adopt new coping skills, and be free of self-injurious ideation, impulses and acts at the time of discharge  Outcome: Progressing     Problem: Depression  Goal: Treatment Goal: Demonstrate behavioral control of depressive symptoms, verbalize feelings of improved mood/affect, and adopt new coping skills prior to discharge  Outcome: Progressing  Goal: Verbalize thoughts and feelings  Description: Interventions:  - Assess and re-assess patient's level of risk   - Engage patient in 1:1 interactions, daily, for a minimum of 15 minutes   - Encourage patient to express feelings, fears, frustrations, hopes   Outcome: Progressing  Goal: Refrain from harming self  Description: Interventions:  - Monitor patient closely, per order   - Supervise medication ingestion, monitor effects and side effects   Outcome: Progressing  Goal: Refrain from isolation  Description: Interventions:  - Develop a trusting relationship   - Encourage socialization   Outcome: Progressing  Goal: Refrain from self-neglect  Outcome: Progressing  Goal: Attend and participate in unit activities, including therapeutic, recreational, and educational groups  Description: Interventions:  - Provide therapeutic and educational activities daily, encourage attendance and participation, and document same in the medical record   Outcome: Progressing  Goal: Complete daily ADLs, including personal hygiene independently, as able  Description:  Interventions:  - Observe, teach, and assist patient with ADLS  -  Monitor and promote a balance of rest/activity, with adequate nutrition and elimination   Outcome: Progressing     Problem: Anxiety  Goal: Anxiety is at manageable level  Description: Interventions:  - Assess and monitor patient's anxiety level.   - Monitor for signs and symptoms (heart palpitations, chest pain, shortness of breath, headaches, nausea, feeling jumpy, restlessness, irritable, apprehensive).   - Collaborate with interdisciplinary team and initiate plan and interventions as ordered.  - [place] patient to unit/surroundings  - Explain treatment plan  - Encourage participation in care  - Encourage verbalization of concerns/fears  - Identify coping mechanisms  - Assist in developing anxiety-reducing skills  - Administer/offer alternative therapies  - Limit or eliminate stimulants  Outcome: Progressing     Problem: DISCHARGE PLANNING - CARE MANAGEMENT  Goal: Discharge to post-acute care or home with appropriate resources  Description: INTERVENTIONS:  - Conduct assessment to determine patient/family and health care team treatment goals, and need for post-acute services based on payer coverage, community resources, and patient preferences, and barriers to discharge  - Address psychosocial, clinical, and financial barriers to discharge as identified in assessment in conjunction with the patient/family and health care team  - Arrange appropriate level of post-acute services according to patient’s   needs and preference and payer coverage in collaboration with the physician and health care team  - Communicate with and update the patient/family, physician, and health care team regarding progress on the discharge plan  - Arrange appropriate transportation to post-acute venues  Outcome: Progressing     Problem: Knowledge Deficit  Goal: Patient/family/caregiver demonstrates understanding of disease process, treatment plan, medications, and discharge  instructions  Description: Complete learning assessment and assess knowledge base.  Interventions:  - Provide teaching at level of understanding  - Provide teaching via preferred learning methods  Outcome: Progressing     Problem: SLEEP DISTURBANCE  Goal: Will exhibit normal sleeping pattern  Description: Interventions:  -  Assess the patients sleep pattern, noting recent changes  - Administer medication as ordered  - Decrease environmental stimuli, including noise, as appropriate during the night  - Encourage the patient to actively participate in unit groups and or exercise during the day to enhance ability to achieve adequate sleep at night  - Assess the patient, in the morning, encouraging a description of sleep experience  Outcome: Progressing

## 2024-07-19 NOTE — PROGRESS NOTES
"Progress Note - Behavioral Health   Franco Roberson 39 y.o. male MRN: [id number]  Unit/Bed#: U 344-01 Encounter: [id number]      Assessment & Plan   Principal Problem:    MDD (major depressive disorder), recurrent severe, without psychosis (HCC)  Active Problems:    Unspecified depressive disorder (HCC)    Medical clearance for psychiatric admission    Hyperlipidemia    Vitamin D deficiency    B12 deficiency    Hypothyroidism    Rule Out Autism spectrum disorder      Subjective:  Patient was seen today for continuation of care, records reviewed and patient was discussed with the morning case review team.  Per staff, Franco continues to be calm and cooperative on the unit, isolative at times.  He is meal and medication compliant and is awaiting placement with [facility].     Franco was seen today for psychiatric follow-up.  On assessment today, Franco was seen in the group room away from peers.  He is coloring today.  He states his mood continues to be \"good\".  He continues to wake up multiple times during the night.  He initially believed he was waking up because of his roommate's snoring but admits to interrupted sleep even when his roommate was not in the room last night.  He is agreeable to increase in melatonin tonight to address interrupted sleep.     Franco denies acute suicidal/self-harm ideation/intent/plan upon direct inquiry at this time.  Franco remains behaviorally appropriate, no agitation or aggression noted on exam or in report.  Franco also denies HI/AH/VH, and does not appear overtly manic nor does he present as internally preoccupied.  No overt delusions or paranoia are verbalized. Franco remains adherent to his current psychotropic medication regimen and denies any side effects from medications, as well as none noted on exam.    Recommended Treatment: Treatment plan and medication changes discussed and per the attending physician the plan is:    1.Continue with group therapy, milieu therapy and " "occupational therapy  2.Behavioral Health checks every 7 minutes  3.Continue frequent safety checks and vitals per unit protocol  4.Continue with [facility] medical management as indicated  5.Continue Zoloft  150mg PO daily for anxiety/depression, Abilify 5mg PO daily, and increasing melatonin to 6 mg PO at HS.   6.Will review labs in the a.m.  7.Disposition Planning: Discharge planning and efforts remain ongoing, Rossy Avila     Vitals:  Vitals:    07/19/24 0715   BP: 129/74   Pulse: 69   Resp: 18   Temp: 97.6 °F (36.4 °C)   SpO2: 97%       Laboratory Results:  I have personally reviewed all pertinent laboratory/tests results.  Most Recent Labs:   Lab Results   Component Value Date    WBC 7.01 06/27/2024    RBC 4.54 06/27/2024    HGB 14.5 06/27/2024    HCT 44.4 06/27/2024     06/27/2024    RDW 12.0 06/27/2024    NEUTROABS 4.01 06/27/2024    SODIUM 139 07/10/2024    K 3.8 07/10/2024     07/10/2024    CO2 27 07/10/2024    BUN 16 07/10/2024    CREATININE 0.92 07/10/2024    GLUC 87 07/10/2024    GLUF 87 07/10/2024    CALCIUM 9.3 07/10/2024    AST 28 07/10/2024    ALT 36 07/10/2024    ALKPHOS 66 07/10/2024    TP 7.7 07/10/2024    ALB 4.4 07/10/2024    TBILI 0.55 07/10/2024    CHOLESTEROL 175 07/05/2024    HDL 53 07/05/2024    TRIG 99 07/05/2024    LDLCALC 102 (H) 07/05/2024    NONHDLC 122 07/05/2024    JPL3SSSNMOSJ 5.705 (H) 07/10/2024    FREET4 0.54 (L) 07/10/2024       Psychiatric Review of Systems:  Behavior over the last 24 hours: slowly improving  Sleep: interrupted  Appetite: adequate  Medication side effects: none   ROS: no complaints, denies shortness of breath or chest pain and all other systems are negative for acute changes    Mental Status Evaluation:    Appearance:  disheveled, marginal hygiene, casual shirt and hospital pants    Behavior:  cooperative, calm   Speech:  fluent, clear, slow   Mood:  \"Good\"   Affect:  constricted   Thought Process:  goal directed, linear, concrete   Associations: " concrete associations   Thought Content:  no overt delusions   Perceptual Disturbances: denies auditory or visual hallucinations when asked, does not appear responding to internal stimuli   Risk Potential: Suicidal ideation - None  Homicidal ideation - None  Potential for aggression - Not at present   Sensorium:  oriented to person, place, and time/date   Memory:  recent memory intact   Consciousness:  alert and awake   Attention/Concentration: attention span and concentration are age appropriate   Insight:  limited   Judgment: limited   Gait/Station: normal gait/station   Motor Activity: no abnormal movements     Progress Toward Goals:   This patient is stable and appears to be at baseline, however, requires ongoing hospitalization due to continued poor insight, judgement, and coping that poses a risk to patient in an unsupervised setting. Without medication management in place, patient is likely to decompensate rapidly and become a risk of danger to self or others.      Risk of Harm to Self:   Nursing Suicide Risk Assessment Last 24 hours: C-SSRS Risk (Since Last Contact)  Calculated C-SSRS Risk Score (Since Last Contact): No Risk Indicated  Current Specific Risk Factors include: mental illness diagnosis  Protective Factors: no current suicidal ideation, ability to communicate with staff on the unit, able to contract for safety on the unit  Based on today's assessment, Franco presents the following risk of harm to self: low    Risk of Harm to Others:  Nursing Homicide Risk Assessment: Violence Risk to Others: Denies within past 6 months  Current Specific Risk Factors include: social difficulties  Protective Factors: no current homicidal ideation, compliant with medications on the unit as ordered, compliant with unit milieu, follows staff redirection  Based on today's assessment, Franco presents the following risk of harm to others: low    The following interventions are recommended: behavioral checks every 7  minutes, continued hospitalization on locked unit        Behavioral Health Medications: all current active meds have been reviewed and planned medication changes: increasing melatonin to 6mg PO at HS .  Current Facility-Administered Medications   Medication Dose Route Frequency Provider Last Rate    acetaminophen  650 mg Oral Q6H PRN Basilio Brown MD      acetaminophen  650 mg Oral Q4H PRN Basilio Brown MD      acetaminophen  975 mg Oral Q6H PRN Basilio Brown MD      aluminum-magnesium hydroxide-simethicone  30 mL Oral Q4H PRN Basilio Brown MD      ARIPiprazole  5 mg Oral Daily Basilio Panda MD      Artificial Tears  1 drop Both Eyes Q3H PRN Basilio Brown MD      atorvastatin  10 mg Oral Daily With Dinner WALLY Baptiste      benztropine  1 mg Intramuscular Q4H PRN Max 6/day Basilio Brown MD      benztropine  1 mg Oral Q4H PRN Max 6/day Basilio Brown MD      Cholecalciferol  2,000 Units Oral Daily WALLY Baptiste      cyanocobalamin  1,000 mcg Oral Daily WALLY Baptiste      Diclofenac Sodium  2 g Topical 4x Daily PRN WALLY Baptiste      hydrOXYzine HCL  50 mg Oral Q6H PRN Max 4/day Basilio Brown MD      Or    diphenhydrAMINE  50 mg Intramuscular Q6H PRN Basilio Brown MD      famotidine  20 mg Oral BID WALLY Baptiste      hydrOXYzine HCL  100 mg Oral Q6H PRN Max 4/day Basilio Brown MD      Or    LORazepam  2 mg Intramuscular Q6H PRN Basilio Brown MD      hydrOXYzine HCL  25 mg Oral Q6H PRN Max 4/day Basilio Brown MD      levothyroxine  37.5 mcg Oral Early Morning WALLY Baptiste      melatonin  6 mg Oral HS WALLY Gomez      methocarbamol  500 mg Oral Q6H PRN WALLY Baptiste      OLANZapine  5 mg Oral Q4H PRN Max 3/day Basilio Brown MD      Or    OLANZapine  2.5 mg Intramuscular Q4H PRN Max 3/day Basilio Brown MD       OLANZapine  5 mg Oral Q3H PRN Max 3/day Basilio Brown MD      Or    OLANZapine  5 mg Intramuscular Q3H PRN Max 3/day Basilio Brown MD      OLANZapine  2.5 mg Oral Q4H PRN Max 6/day Basilio Brown MD      polyethylene glycol  17 g Oral Daily PRN Basilio Brown MD      propranolol  10 mg Oral Q8H PRN Basilio Brown MD      senna-docusate sodium  1 tablet Oral Daily PRN Basilio Brown MD      sertraline  150 mg Oral Daily WALLY Gomez      traZODone  50 mg Oral HS PRN Basilio Brown MD         Risks / Benefits of Treatment:  Risks, benefits, and possible side effects of medications explained to patient and patient verbalizes understanding and agreement for treatment.    Counseling / Coordination of Care:  Patient's progress reviewed with nursing staff.  Medications, treatment progress and treatment plan reviewed with patient.  Supportive counseling provided to the patient.    Total floor/unit time spent today 25 minutes. Greater than 50% of total time was spent with the patient and / or family counseling and / or coordination of care. A description of the counseling / coordination of care: medication education, treatment plan, supportive therapy.

## 2024-07-20 PROCEDURE — 99232 SBSQ HOSP IP/OBS MODERATE 35: CPT | Performed by: PSYCHIATRY & NEUROLOGY

## 2024-07-20 RX ADMIN — CHOLECALCIFEROL TAB 25 MCG (1000 UNIT) 2000 UNITS: 25 TAB at 11:42

## 2024-07-20 RX ADMIN — FAMOTIDINE 20 MG: 20 TABLET ORAL at 17:31

## 2024-07-20 RX ADMIN — MELATONIN TAB 3 MG 6 MG: 3 TAB at 21:00

## 2024-07-20 RX ADMIN — SERTRALINE HYDROCHLORIDE 150 MG: 100 TABLET ORAL at 09:09

## 2024-07-20 RX ADMIN — CYANOCOBALAMIN TAB 1000 MCG 1000 MCG: 1000 TAB at 09:09

## 2024-07-20 RX ADMIN — ATORVASTATIN CALCIUM 10 MG: 10 TABLET, FILM COATED ORAL at 17:31

## 2024-07-20 RX ADMIN — FAMOTIDINE 20 MG: 20 TABLET ORAL at 09:09

## 2024-07-20 RX ADMIN — LEVOTHYROXINE SODIUM 37.5 MCG: 125 TABLET ORAL at 06:13

## 2024-07-20 RX ADMIN — ARIPIPRAZOLE 5 MG: 5 TABLET ORAL at 09:09

## 2024-07-20 NOTE — PLAN OF CARE
Problem: Ineffective Coping  Goal: Identifies ineffective coping skills  Outcome: Progressing  Goal: Identifies healthy coping skills  Outcome: Progressing  Goal: Demonstrates healthy coping skills  Outcome: Progressing  Goal: Participates in unit activities  Description: Interventions:  - Provide therapeutic environment   - Provide required programming   - Redirect inappropriate behaviors   Outcome: Progressing     Problem: Risk for Self Injury/Neglect  Goal: Treatment Goal: Remain safe during length of stay, learn and adopt new coping skills, and be free of self-injurious ideation, impulses and acts at the time of discharge  Outcome: Progressing     Problem: Depression  Goal: Treatment Goal: Demonstrate behavioral control of depressive symptoms, verbalize feelings of improved mood/affect, and adopt new coping skills prior to discharge  Outcome: Progressing  Goal: Verbalize thoughts and feelings  Description: Interventions:  - Assess and re-assess patient's level of risk   - Engage patient in 1:1 interactions, daily, for a minimum of 15 minutes   - Encourage patient to express feelings, fears, frustrations, hopes   Outcome: Progressing  Goal: Refrain from harming self  Description: Interventions:  - Monitor patient closely, per order   - Supervise medication ingestion, monitor effects and side effects   Outcome: Progressing  Goal: Refrain from isolation  Description: Interventions:  - Develop a trusting relationship   - Encourage socialization   Outcome: Progressing  Goal: Refrain from self-neglect  Outcome: Progressing  Goal: Attend and participate in unit activities, including therapeutic, recreational, and educational groups  Description: Interventions:  - Provide therapeutic and educational activities daily, encourage attendance and participation, and document same in the medical record   Outcome: Progressing  Goal: Complete daily ADLs, including personal hygiene independently, as able  Description:  Interventions:  - Observe, teach, and assist patient with ADLS  -  Monitor and promote a balance of rest/activity, with adequate nutrition and elimination   Outcome: Progressing     Problem: Anxiety  Goal: Anxiety is at manageable level  Description: Interventions:  - Assess and monitor patient's anxiety level.   - Monitor for signs and symptoms (heart palpitations, chest pain, shortness of breath, headaches, nausea, feeling jumpy, restlessness, irritable, apprehensive).   - Collaborate with interdisciplinary team and initiate plan and interventions as ordered.  - [place] patient to unit/surroundings  - Explain treatment plan  - Encourage participation in care  - Encourage verbalization of concerns/fears  - Identify coping mechanisms  - Assist in developing anxiety-reducing skills  - Administer/offer alternative therapies  - Limit or eliminate stimulants  Outcome: Progressing     Problem: DISCHARGE PLANNING - CARE MANAGEMENT  Goal: Discharge to post-acute care or home with appropriate resources  Description: INTERVENTIONS:  - Conduct assessment to determine patient/family and health care team treatment goals, and need for post-acute services based on payer coverage, community resources, and patient preferences, and barriers to discharge  - Address psychosocial, clinical, and financial barriers to discharge as identified in assessment in conjunction with the patient/family and health care team  - Arrange appropriate level of post-acute services according to patient’s   needs and preference and payer coverage in collaboration with the physician and health care team  - Communicate with and update the patient/family, physician, and health care team regarding progress on the discharge plan  - Arrange appropriate transportation to post-acute venues  Outcome: Progressing     Problem: Knowledge Deficit  Goal: Patient/family/caregiver demonstrates understanding of disease process, treatment plan, medications, and discharge  instructions  Description: Complete learning assessment and assess knowledge base.  Interventions:  - Provide teaching at level of understanding  - Provide teaching via preferred learning methods  Outcome: Progressing     Problem: SLEEP DISTURBANCE  Goal: Will exhibit normal sleeping pattern  Description: Interventions:  -  Assess the patients sleep pattern, noting recent changes  - Administer medication as ordered  - Decrease environmental stimuli, including noise, as appropriate during the night  - Encourage the patient to actively participate in unit groups and or exercise during the day to enhance ability to achieve adequate sleep at night  - Assess the patient, in the morning, encouraging a description of sleep experience  Outcome: Progressing

## 2024-07-20 NOTE — NURSING NOTE
"Pt visible in the evening, pt is wearing the same shirt he has worn the past two days.  Pt is compliant with HS melatonin/   Pt is pleasant and cooperative.  PT observed walking in the hallway,  sitting and socializing with staff, and also quietly reading g in the day room.  Pt reports maintaining a positive positive thoughts regarding his discharge and succeffully finding a placement.  PT reports that \"sleeping in\" is his most effective coping mechanism.    "

## 2024-07-20 NOTE — PROGRESS NOTES
"Progress Note - Behavioral Health   Franco Roberson 39 y.o. male MRN: [id number]  Unit/Bed#: -01 Encounter: [id number]    Assessment   Principal Problem:    MDD (major depressive disorder), recurrent severe, without psychosis (HCC)  Active Problems:    Unspecified depressive disorder (HCC)    Medical clearance for psychiatric admission    Hyperlipidemia    Vitamin D deficiency    B12 deficiency    Hypothyroidism    Rule Out Autism spectrum disorder      Plan    No psychopharmacologic changes made at this time. Will continue to assess daily for further optimization.  Continue current psychiatric medications.  Continue to assess for adverse medication side effects.  Continue medication management per [facility] ([facility]) as indicated.  Continue to encourage group therapy, milieu therapy, individual therapy, and occupational therapy.  Continue to engage CM/SW to assist with collateral, disposition planning, and the implementation of an individualized, patient-centered plan of care.  Continue frequent safety checks and vitals per unit protocol.      Risks, benefits and possible side effects of Medications:   Risks, benefits, and possible side effects of medications have previously been explained. No new medications at this time.    Legal status: 201 voluntary commitment  Disposition: discharge pending, James    ------------------------------------------------------------------------------------------------------  Subjective:  Per nursing report: Patient has been pleasant, visible in the milieu, socially withdrawn, makes needs known.  Med and meal compliant. PRNs in last 24 hours: No PRNs overnight.    Franco was evaluated this morning for continuity of care. Patient was cooperative with interview. Patient reports mood today as \" very good.\"  Patient states that he slept a lot better with the increase in melatonin.  He states that he only woke up 1 time and then all the night to urinate and then " "was able to fall back asleep easily.  He states that he had a bowel movement today.    At the time of today's evaluation, the patient denies AVH, active SI, passive SI, thoughts to self harm, and HI.       Progress Toward Goals: progressing    Psychiatric Review of Systems:   Behavior over the last 24 hours:  unchanged  Sleep: Improved  Appetite:  normal  Medication side effects:No   ROS:all other systems are negative.   Last bowel movement: This morning      Vital Signs in last 24 hours:  Reviewed, within normal limits  Temp:  [97.2 °F (36.2 °C)-97.7 °F (36.5 °C)] 97.2 °F (36.2 °C)  HR:  [63-76] 76  Resp:  [16-18] 16  BP: (119-133)/(70-81) 133/70    Laboratory Results:    I have personally reviewed all pertinent laboratory/tests results  Most Recent Labs:   Lab Results   Component Value Date    WBC 7.01 06/27/2024    RBC 4.54 06/27/2024    HGB 14.5 06/27/2024    HCT 44.4 06/27/2024     06/27/2024    RDW 12.0 06/27/2024    NEUTROABS 4.01 06/27/2024    SODIUM 139 07/10/2024    K 3.8 07/10/2024     07/10/2024    CO2 27 07/10/2024    BUN 16 07/10/2024    CREATININE 0.92 07/10/2024    GLUC 87 07/10/2024    CALCIUM 9.3 07/10/2024    AST 28 07/10/2024    ALT 36 07/10/2024    ALKPHOS 66 07/10/2024    TP 7.7 07/10/2024    ALB 4.4 07/10/2024    TBILI 0.55 07/10/2024    CHOLESTEROL 175 07/05/2024    HDL 53 07/05/2024    TRIG 99 07/05/2024    LDLCALC 102 (H) 07/05/2024    NONHDLC 122 07/05/2024    MAT0QGZNGOUO 5.705 (H) 07/10/2024    FREET4 0.54 (L) 07/10/2024    SYPHILISAB Non-reactive 05/15/2024       Mental Status Evaluation:  Appearance:  Overtly appearing  male, casually dressed, marginal hygiene, obese   Behavior:  pleasant, cooperative, calm, limited eye contact   Speech:  slow, scant, normal volume   Mood:  \"Very good\"   Affect:  Euthymic, mood congruent   Thought Process:  goal directed, linear, concrete   Thought Content:  no overt delusions   Perceptual Disturbances: denies auditory or visual " hallucinations when asked, does not appear responding to internal stimuli   Risk Potential: Suicidal ideation - None at present  Homicidal ideation - None at present  Potential for aggression - Not at present   Sensorium:  oriented to person, place, and time/date   Memory:  recent and remote memory grossly intact   Consciousness:  alert and awake   Attention/Concentration: attention span and concentration are age appropriate   Insight:  limited   Judgment: limited   Gait/Station: normal gait/station   Motor Activity: no abnormal movements       Behavioral Health Medications: All current active meds have been reviewed. Changes as in plan section above.     Current Medications: all current active meds have been reviewed, continue current psychiatric medications, and current meds:   Current Facility-Administered Medications   Medication Dose Route Frequency    acetaminophen (TYLENOL) tablet 650 mg  650 mg Oral Q6H PRN    acetaminophen (TYLENOL) tablet 650 mg  650 mg Oral Q4H PRN    acetaminophen (TYLENOL) tablet 975 mg  975 mg Oral Q6H PRN    aluminum-magnesium hydroxide-simethicone (MAALOX) oral suspension 30 mL  30 mL Oral Q4H PRN    ARIPiprazole (ABILIFY) tablet 5 mg  5 mg Oral Daily    Artificial Tears ophthalmic solution 1 drop  1 drop Both Eyes Q3H PRN    atorvastatin (LIPITOR) tablet 10 mg  10 mg Oral Daily With Dinner    benztropine (COGENTIN) injection 1 mg  1 mg Intramuscular Q4H PRN Max 6/day    benztropine (COGENTIN) tablet 1 mg  1 mg Oral Q4H PRN Max 6/day    Cholecalciferol (VITAMIN D3) tablet 2,000 Units  2,000 Units Oral Daily    cyanocobalamin (VITAMIN B-12) tablet 1,000 mcg  1,000 mcg Oral Daily    Diclofenac Sodium (VOLTAREN) 1 % topical gel 2 g  2 g Topical 4x Daily PRN    hydrOXYzine HCL (ATARAX) tablet 50 mg  50 mg Oral Q6H PRN Max 4/day    Or    diphenhydrAMINE (BENADRYL) injection 50 mg  50 mg Intramuscular Q6H PRN    famotidine (PEPCID) tablet 20 mg  20 mg Oral BID    hydrOXYzine HCL (ATARAX)  tablet 100 mg  100 mg Oral Q6H PRN Max 4/day    Or    LORazepam (ATIVAN) injection 2 mg  2 mg Intramuscular Q6H PRN    hydrOXYzine HCL (ATARAX) tablet 25 mg  25 mg Oral Q6H PRN Max 4/day    levothyroxine tablet 37.5 mcg  37.5 mcg Oral Early Morning    melatonin tablet 6 mg  6 mg Oral HS    methocarbamol (ROBAXIN) tablet 500 mg  500 mg Oral Q6H PRN    OLANZapine (ZyPREXA) tablet 5 mg  5 mg Oral Q4H PRN Max 3/day    Or    OLANZapine (ZyPREXA) IM injection 2.5 mg  2.5 mg Intramuscular Q4H PRN Max 3/day    OLANZapine (ZyPREXA) tablet 5 mg  5 mg Oral Q3H PRN Max 3/day    Or    OLANZapine (ZyPREXA) IM injection 5 mg  5 mg Intramuscular Q3H PRN Max 3/day    OLANZapine (ZyPREXA) tablet 2.5 mg  2.5 mg Oral Q4H PRN Max 6/day    polyethylene glycol (MIRALAX) packet 17 g  17 g Oral Daily PRN    propranolol (INDERAL) tablet 10 mg  10 mg Oral Q8H PRN    senna-docusate sodium (SENOKOT S) 8.6-50 mg per tablet 1 tablet  1 tablet Oral Daily PRN    sertraline (ZOLOFT) tablet 150 mg  150 mg Oral Daily    traZODone (DESYREL) tablet 50 mg  50 mg Oral HS PRN   .  Current Facility-Administered Medications   Medication Dose Route Frequency Provider Last Rate    acetaminophen  650 mg Oral Q6H PRN Basilio Brown MD      acetaminophen  650 mg Oral Q4H PRN Basilio Brown MD      acetaminophen  975 mg Oral Q6H PRN Basilio Brown MD      aluminum-magnesium hydroxide-simethicone  30 mL Oral Q4H PRN Basilio Brown MD      ARIPiprazole  5 mg Oral Daily Basilio Panda MD      Artificial Tears  1 drop Both Eyes Q3H PRN Basilio Brown MD      atorvastatin  10 mg Oral Daily With Dinner WALLY Baptiste      benztropine  1 mg Intramuscular Q4H PRN Max 6/day Basilio Brown MD      benztropine  1 mg Oral Q4H PRN Max 6/day Basilio Brown MD      Cholecalciferol  2,000 Units Oral Daily WALLY Baptiste      cyanocobalamin  1,000 mcg Oral Daily WALLY Baptiste      Diclofenac  Sodium  2 g Topical 4x Daily PRN WALLY Baptiste      hydrOXYzine HCL  50 mg Oral Q6H PRN Max 4/day Basilio Brown MD      Or    diphenhydrAMINE  50 mg Intramuscular Q6H PRN Basilio Brown MD      famotidine  20 mg Oral BID WALLY Baptiste      hydrOXYzine HCL  100 mg Oral Q6H PRN Max 4/day Basilio Brown MD      Or    LORazepam  2 mg Intramuscular Q6H PRN Basilio Brown MD      hydrOXYzine HCL  25 mg Oral Q6H PRN Max 4/day Basilio Brown MD      levothyroxine  37.5 mcg Oral Early Morning WALLY Baptiste      melatonin  6 mg Oral HS WALLY Gomez      methocarbamol  500 mg Oral Q6H PRN WALLY Baptiste      OLANZapine  5 mg Oral Q4H PRN Max 3/day Basilio Brown MD      Or    OLANZapine  2.5 mg Intramuscular Q4H PRN Max 3/day Basilio Brown MD      OLANZapine  5 mg Oral Q3H PRN Max 3/day Basilio Brown MD      Or    OLANZapine  5 mg Intramuscular Q3H PRN Max 3/day Basilio Brown MD      OLANZapine  2.5 mg Oral Q4H PRN Max 6/day Basilio Brown MD      polyethylene glycol  17 g Oral Daily PRN Basilio Brown MD      propranolol  10 mg Oral Q8H PRN Basilio Brown MD      senna-docusate sodium  1 tablet Oral Daily PRN Basilio Brown MD      sertraline  150 mg Oral Daily WALLY Gomez      traZODone  50 mg Oral HS PRN Basilio Brown MD           Counseling / Coordination of Care:  Patient's progress discussed with staff in daily treatment team meeting.    Crys Echevarria DO 07/20/24  Psychiatry Resident, PGY- II  [facility]  Department of Psychiatry and Behavioral Health      This note was not shared with the patient due to reasonable likelihood of causing patient harm.    This note has been constructed using a voice recognition system (Dragon). As, a result, there may be translation, syntax, or grammatical errors.  Grammatical, translation, syntax  errors, random word insertions, spelling mistakes, and incomplete sentences may be an occasional consequence of this system secondary to software limitations with voice recognition, ambient noise, and hardware issues. If you have any questions or concerns about the content, text, or information contained within the body of this dictation, please contact the provider for clarification.

## 2024-07-20 NOTE — NURSING NOTE
Patient is visible in the milieu. He is calm, cooperative, socially withdrawn but responds to questions appropriately. His eye contact remains quite poor. He is able to make his needs known to staff. He is adherent with medication. He is looking forward to discharge. Denies ALEYDA RAJPUT, LATOYA.

## 2024-07-21 PROCEDURE — 99232 SBSQ HOSP IP/OBS MODERATE 35: CPT | Performed by: PSYCHIATRY & NEUROLOGY

## 2024-07-21 RX ADMIN — ARIPIPRAZOLE 5 MG: 5 TABLET ORAL at 08:40

## 2024-07-21 RX ADMIN — MELATONIN TAB 3 MG 6 MG: 3 TAB at 21:37

## 2024-07-21 RX ADMIN — FAMOTIDINE 20 MG: 20 TABLET ORAL at 17:47

## 2024-07-21 RX ADMIN — LEVOTHYROXINE SODIUM 37.5 MCG: 125 TABLET ORAL at 06:29

## 2024-07-21 RX ADMIN — CYANOCOBALAMIN TAB 1000 MCG 1000 MCG: 1000 TAB at 08:40

## 2024-07-21 RX ADMIN — FAMOTIDINE 20 MG: 20 TABLET ORAL at 08:40

## 2024-07-21 RX ADMIN — SERTRALINE HYDROCHLORIDE 150 MG: 100 TABLET ORAL at 08:40

## 2024-07-21 RX ADMIN — CHOLECALCIFEROL TAB 25 MCG (1000 UNIT) 2000 UNITS: 25 TAB at 08:41

## 2024-07-21 RX ADMIN — ATORVASTATIN CALCIUM 10 MG: 10 TABLET, FILM COATED ORAL at 17:47

## 2024-07-21 NOTE — NURSING NOTE
Pt is pleasant, cooperative, and med compliant. Pt can be seen out on the milieu. Pt denies all psych symptoms at this time. No unmet needs reported. Will continue to monitor.

## 2024-07-21 NOTE — PLAN OF CARE
Problem: Ineffective Coping  Goal: Identifies ineffective coping skills  Outcome: Progressing  Goal: Identifies healthy coping skills  Outcome: Progressing  Goal: Demonstrates healthy coping skills  Outcome: Progressing  Goal: Participates in unit activities  Description: Interventions:  - Provide therapeutic environment   - Provide required programming   - Redirect inappropriate behaviors   Outcome: Progressing     Problem: Risk for Self Injury/Neglect  Goal: Treatment Goal: Remain safe during length of stay, learn and adopt new coping skills, and be free of self-injurious ideation, impulses and acts at the time of discharge  Outcome: Progressing     Problem: Depression  Goal: Treatment Goal: Demonstrate behavioral control of depressive symptoms, verbalize feelings of improved mood/affect, and adopt new coping skills prior to discharge  Outcome: Progressing  Goal: Verbalize thoughts and feelings  Description: Interventions:  - Assess and re-assess patient's level of risk   - Engage patient in 1:1 interactions, daily, for a minimum of 15 minutes   - Encourage patient to express feelings, fears, frustrations, hopes   Outcome: Progressing  Goal: Refrain from harming self  Description: Interventions:  - Monitor patient closely, per order   - Supervise medication ingestion, monitor effects and side effects   Outcome: Progressing  Goal: Refrain from isolation  Description: Interventions:  - Develop a trusting relationship   - Encourage socialization   Outcome: Progressing  Goal: Refrain from self-neglect  Outcome: Progressing  Goal: Attend and participate in unit activities, including therapeutic, recreational, and educational groups  Description: Interventions:  - Provide therapeutic and educational activities daily, encourage attendance and participation, and document same in the medical record   Outcome: Progressing  Goal: Complete daily ADLs, including personal hygiene independently, as able  Description:  Interventions:  - Observe, teach, and assist patient with ADLS  -  Monitor and promote a balance of rest/activity, with adequate nutrition and elimination   Outcome: Progressing     Problem: Anxiety  Goal: Anxiety is at manageable level  Description: Interventions:  - Assess and monitor patient's anxiety level.   - Monitor for signs and symptoms (heart palpitations, chest pain, shortness of breath, headaches, nausea, feeling jumpy, restlessness, irritable, apprehensive).   - Collaborate with interdisciplinary team and initiate plan and interventions as ordered.  - [place] patient to unit/surroundings  - Explain treatment plan  - Encourage participation in care  - Encourage verbalization of concerns/fears  - Identify coping mechanisms  - Assist in developing anxiety-reducing skills  - Administer/offer alternative therapies  - Limit or eliminate stimulants  Outcome: Progressing     Problem: DISCHARGE PLANNING - CARE MANAGEMENT  Goal: Discharge to post-acute care or home with appropriate resources  Description: INTERVENTIONS:  - Conduct assessment to determine patient/family and health care team treatment goals, and need for post-acute services based on payer coverage, community resources, and patient preferences, and barriers to discharge  - Address psychosocial, clinical, and financial barriers to discharge as identified in assessment in conjunction with the patient/family and health care team  - Arrange appropriate level of post-acute services according to patient’s   needs and preference and payer coverage in collaboration with the physician and health care team  - Communicate with and update the patient/family, physician, and health care team regarding progress on the discharge plan  - Arrange appropriate transportation to post-acute venues  Outcome: Progressing     Problem: Knowledge Deficit  Goal: Patient/family/caregiver demonstrates understanding of disease process, treatment plan, medications, and discharge  instructions  Description: Complete learning assessment and assess knowledge base.  Interventions:  - Provide teaching at level of understanding  - Provide teaching via preferred learning methods  Outcome: Progressing     Problem: SLEEP DISTURBANCE  Goal: Will exhibit normal sleeping pattern  Description: Interventions:  -  Assess the patients sleep pattern, noting recent changes  - Administer medication as ordered  - Decrease environmental stimuli, including noise, as appropriate during the night  - Encourage the patient to actively participate in unit groups and or exercise during the day to enhance ability to achieve adequate sleep at night  - Assess the patient, in the morning, encouraging a description of sleep experience  Outcome: Progressing

## 2024-07-21 NOTE — NURSING NOTE
Patient reports only mild feelings of anxiety and depression. States he feels ready for discharge. He is calm, cooperative with assessment, adherent with medications. Eye contact remains poor. He remains socially withdrawn, though he is intermittently visible in the milieu and his verbal responses to staff are oriented and appropriate.

## 2024-07-21 NOTE — PROGRESS NOTES
"Progress Note - Behavioral Health   Franco Roberson 39 y.o. male MRN: [id number]  Unit/Bed#: -01 Encounter: [id number]    Assessment   Principal Problem:    MDD (major depressive disorder), recurrent severe, without psychosis (HCC)  Active Problems:    Unspecified depressive disorder (HCC)    Medical clearance for psychiatric admission    Hyperlipidemia    Vitamin D deficiency    B12 deficiency    Hypothyroidism    Rule Out Autism spectrum disorder      Plan    No psychopharmacologic changes made at this time. Will continue to assess daily for further optimization.  Continue current psychiatric medications.  Continue to assess for adverse medication side effects.  Continue medication management per [facility] ([facility]) as indicated.  Continue to encourage group therapy, milieu therapy, individual therapy, and occupational therapy.  Continue to engage CM/SW to assist with collateral, disposition planning, and the implementation of an individualized, patient-centered plan of care.  Continue frequent safety checks and vitals per unit protocol.      Risks, benefits and possible side effects of Medications:   Risks, benefits, and possible side effects of medications have previously been explained. No new medications at this time.    Legal status: 201 voluntary commitment  Disposition: discharge pending, James    ------------------------------------------------------------------------------------------------------  Subjective:  Per nursing report: Patient has been pleasant, cooperative, seen in the milieu, med and meal compliant. PRNs in last 24 hours: No PRNs given overnight    Franco was evaluated this morning in his room for continuity of care. Patient was calm, cooperative with interview. Patient reports mood today as \" good.\"  Patient states that he did not sleep well last night.  He states that he woke up in the middle of the night and then stayed awake for few hours before going back to " "sleep.  He states that he is always like this on the weekends.  He states that he expects Monday or Tuesday he will go back to routine.    At the time of today's evaluation, the patient denies AVH, active SI, passive SI, thoughts to self harm, and HI.       Progress Toward Goals: progressing    Psychiatric Review of Systems:   Behavior over the last 24 hours:  unchanged  Sleep: insomnia  Appetite:  normal  Medication side effects:No   ROS:all other systems are negative.   Last bowel movement: 7/20/2024       Vital Signs in last 24 hours:  Reviewed, within normal limits  Temp:  [96.4 °F (35.8 °C)-97.1 °F (36.2 °C)] 96.4 °F (35.8 °C)  HR:  [59-72] 59  Resp:  [16] 16  BP: (117-122)/(65-66) 122/65    Laboratory Results:    I have personally reviewed all pertinent laboratory/tests results  Most Recent Labs:   Lab Results   Component Value Date    WBC 7.01 06/27/2024    RBC 4.54 06/27/2024    HGB 14.5 06/27/2024    HCT 44.4 06/27/2024     06/27/2024    RDW 12.0 06/27/2024    NEUTROABS 4.01 06/27/2024    SODIUM 139 07/10/2024    K 3.8 07/10/2024     07/10/2024    CO2 27 07/10/2024    BUN 16 07/10/2024    CREATININE 0.92 07/10/2024    GLUC 87 07/10/2024    CALCIUM 9.3 07/10/2024    AST 28 07/10/2024    ALT 36 07/10/2024    ALKPHOS 66 07/10/2024    TP 7.7 07/10/2024    ALB 4.4 07/10/2024    TBILI 0.55 07/10/2024    CHOLESTEROL 175 07/05/2024    HDL 53 07/05/2024    TRIG 99 07/05/2024    LDLCALC 102 (H) 07/05/2024    NONHDLC 122 07/05/2024    [id number] 5.705 (H) 07/10/2024    FREET4 0.54 (L) 07/10/2024    SYPHILISAB Non-reactive 05/15/2024       Mental Status Evaluation:  Appearance:  Overtly appearing  male, casually dressed, marginal hygiene, looks stated age, obese, sitting up in bed   Behavior:  pleasant, cooperative, calm, fair eye contact   Speech:  normal volume, slow, scant   Mood:  \"Good\"   Affect:  mood-congruent, smiling appropriately throughout conversation   Thought Process:  goal " directed, linear, concrete   Thought Content:  no overt delusions   Perceptual Disturbances: denies auditory or visual hallucinations when asked, does not appear responding to internal stimuli   Risk Potential: Suicidal ideation - None at present  Homicidal ideation - None at present  Potential for aggression - Not at present   Sensorium:  oriented to person, place, and time/date   Memory:  recent and remote memory grossly intact   Consciousness:  alert and awake   Attention/Concentration: attention span and concentration are age appropriate   Insight:  limited   Judgment: limited   Gait/Station: in bed   Motor Activity: no abnormal movements       Behavioral Health Medications: All current active meds have been reviewed. Changes as in plan section above.     Current Medications: all current active meds have been reviewed, continue current psychiatric medications, and current meds:   Current Facility-Administered Medications   Medication Dose Route Frequency    acetaminophen (TYLENOL) tablet 650 mg  650 mg Oral Q6H PRN    acetaminophen (TYLENOL) tablet 650 mg  650 mg Oral Q4H PRN    acetaminophen (TYLENOL) tablet 975 mg  975 mg Oral Q6H PRN    aluminum-magnesium hydroxide-simethicone (MAALOX) oral suspension 30 mL  30 mL Oral Q4H PRN    ARIPiprazole (ABILIFY) tablet 5 mg  5 mg Oral Daily    Artificial Tears ophthalmic solution 1 drop  1 drop Both Eyes Q3H PRN    atorvastatin (LIPITOR) tablet 10 mg  10 mg Oral Daily With Dinner    benztropine (COGENTIN) injection 1 mg  1 mg Intramuscular Q4H PRN Max 6/day    benztropine (COGENTIN) tablet 1 mg  1 mg Oral Q4H PRN Max 6/day    Cholecalciferol (VITAMIN D3) tablet 2,000 Units  2,000 Units Oral Daily    cyanocobalamin (VITAMIN B-12) tablet 1,000 mcg  1,000 mcg Oral Daily    Diclofenac Sodium (VOLTAREN) 1 % topical gel 2 g  2 g Topical 4x Daily PRN    hydrOXYzine HCL (ATARAX) tablet 50 mg  50 mg Oral Q6H PRN Max 4/day    Or    diphenhydrAMINE (BENADRYL) injection 50 mg  50  mg Intramuscular Q6H PRN    famotidine (PEPCID) tablet 20 mg  20 mg Oral BID    hydrOXYzine HCL (ATARAX) tablet 100 mg  100 mg Oral Q6H PRN Max 4/day    Or    LORazepam (ATIVAN) injection 2 mg  2 mg Intramuscular Q6H PRN    hydrOXYzine HCL (ATARAX) tablet 25 mg  25 mg Oral Q6H PRN Max 4/day    levothyroxine tablet 37.5 mcg  37.5 mcg Oral Early Morning    melatonin tablet 6 mg  6 mg Oral HS    methocarbamol (ROBAXIN) tablet 500 mg  500 mg Oral Q6H PRN    OLANZapine (ZyPREXA) tablet 5 mg  5 mg Oral Q4H PRN Max 3/day    Or    OLANZapine (ZyPREXA) IM injection 2.5 mg  2.5 mg Intramuscular Q4H PRN Max 3/day    OLANZapine (ZyPREXA) tablet 5 mg  5 mg Oral Q3H PRN Max 3/day    Or    OLANZapine (ZyPREXA) IM injection 5 mg  5 mg Intramuscular Q3H PRN Max 3/day    OLANZapine (ZyPREXA) tablet 2.5 mg  2.5 mg Oral Q4H PRN Max 6/day    polyethylene glycol (MIRALAX) packet 17 g  17 g Oral Daily PRN    propranolol (INDERAL) tablet 10 mg  10 mg Oral Q8H PRN    senna-docusate sodium (SENOKOT S) 8.6-50 mg per tablet 1 tablet  1 tablet Oral Daily PRN    sertraline (ZOLOFT) tablet 150 mg  150 mg Oral Daily    traZODone (DESYREL) tablet 50 mg  50 mg Oral HS PRN   .  Current Facility-Administered Medications   Medication Dose Route Frequency Provider Last Rate    acetaminophen  650 mg Oral Q6H PRN Basilio Brown MD      acetaminophen  650 mg Oral Q4H PRN Basilio Brown MD      acetaminophen  975 mg Oral Q6H PRN Basilio Brown MD      aluminum-magnesium hydroxide-simethicone  30 mL Oral Q4H PRN Basilio Brown MD      ARIPiprazole  5 mg Oral Daily Basilio Panda MD      Artificial Tears  1 drop Both Eyes Q3H PRN Basilio Brown MD      atorvastatin  10 mg Oral Daily With Dinner WALLY Baptiste      benztropine  1 mg Intramuscular Q4H PRN Max 6/day Basilio Brown MD      benztropine  1 mg Oral Q4H PRN Max 6/day Basilio Brown MD      Cholecalciferol  2,000 Units Oral Daily Laura Clark  WALLY [place]      cyanocobalamin  1,000 mcg Oral Daily WALLY Baptiste      Diclofenac Sodium  2 g Topical 4x Daily PRN WALLY Baptiste      hydrOXYzine HCL  50 mg Oral Q6H PRN Max 4/day Basilio Brown MD      Or    diphenhydrAMINE  50 mg Intramuscular Q6H PRN Basilio Brown MD      famotidine  20 mg Oral BID WALLY Baptiste      hydrOXYzine HCL  100 mg Oral Q6H PRN Max 4/day Basilio Brown MD      Or    LORazepam  2 mg Intramuscular Q6H PRN Basilio Brown MD      hydrOXYzine HCL  25 mg Oral Q6H PRN Max 4/day Basilio Brown MD      levothyroxine  37.5 mcg Oral Early Morning WALLY Baptiste      melatonin  6 mg Oral HS WALLY Gomez      methocarbamol  500 mg Oral Q6H PRN WALLY Baptiste      OLANZapine  5 mg Oral Q4H PRN Max 3/day Basilio Brown MD      Or    OLANZapine  2.5 mg Intramuscular Q4H PRN Max 3/day Basilio Brown MD      OLANZapine  5 mg Oral Q3H PRN Max 3/day Basilio Brown MD      Or    OLANZapine  5 mg Intramuscular Q3H PRN Max 3/day Basilio Brown MD      OLANZapine  2.5 mg Oral Q4H PRN Max 6/day Basilio Brown MD      polyethylene glycol  17 g Oral Daily PRN Basilio Brown MD      propranolol  10 mg Oral Q8H PRN Basilio Brown MD      senna-docusate sodium  1 tablet Oral Daily PRN Basilio Brown MD      sertraline  150 mg Oral Daily WALLY Gomez      traZODone  50 mg Oral HS PRN Basilio Brown MD           Counseling / Coordination of Care:  Patient's progress discussed with staff in daily treatment team meeting.    Crys Echevarria DO 07/21/24  Psychiatry Resident, PGY- II  [facility]  Department of Psychiatry and Behavioral Health      This note was not shared with the patient due to reasonable likelihood of causing patient harm.    This note has been constructed using a voice recognition system (Dragon). As, a  result, there may be translation, syntax, or grammatical errors.  Grammatical, translation, syntax errors, random word insertions, spelling mistakes, and incomplete sentences may be an occasional consequence of this system secondary to software limitations with voice recognition, ambient noise, and hardware issues. If you have any questions or concerns about the content, text, or information contained within the body of this dictation, please contact the provider for clarification.

## 2024-07-22 PROCEDURE — 99232 SBSQ HOSP IP/OBS MODERATE 35: CPT

## 2024-07-22 RX ADMIN — FAMOTIDINE 20 MG: 20 TABLET ORAL at 08:20

## 2024-07-22 RX ADMIN — LEVOTHYROXINE SODIUM 37.5 MCG: 125 TABLET ORAL at 06:59

## 2024-07-22 RX ADMIN — ATORVASTATIN CALCIUM 10 MG: 10 TABLET, FILM COATED ORAL at 17:11

## 2024-07-22 RX ADMIN — MELATONIN TAB 3 MG 6 MG: 3 TAB at 21:24

## 2024-07-22 RX ADMIN — SERTRALINE HYDROCHLORIDE 150 MG: 100 TABLET ORAL at 08:20

## 2024-07-22 RX ADMIN — FAMOTIDINE 20 MG: 20 TABLET ORAL at 17:11

## 2024-07-22 RX ADMIN — ARIPIPRAZOLE 5 MG: 5 TABLET ORAL at 08:20

## 2024-07-22 RX ADMIN — CYANOCOBALAMIN TAB 1000 MCG 1000 MCG: 1000 TAB at 08:20

## 2024-07-22 RX ADMIN — CHOLECALCIFEROL TAB 25 MCG (1000 UNIT) 2000 UNITS: 25 TAB at 08:20

## 2024-07-22 NOTE — NURSING NOTE
Pt visible on the unit,  withdrawn to self.  Pt denies HI/SI/AVH,  compliant with meds and present for dinner/ Pt is dressed in personal attire.  Pt denies anxiety or depression,  and is hopefull for adequate placement following eventual discharge.  Pt has not reported unmet needs at this time.

## 2024-07-22 NOTE — NURSING NOTE
Pt is calm and cooperative upon approach. Pt reports poor sleep. Pt is visible and social on the unit. Denies SI, HI, AH, and VH. Medication and meal compliant. Denies any needs at this time.

## 2024-07-22 NOTE — SOCIAL WORK
Cm called [facility] to follow up with referral for residential treatment. They did not answer and Cm left detailed voicemail.   Cm is waiting to get a returned call.

## 2024-07-22 NOTE — PROGRESS NOTES
07/22/24 0913   Team Meeting   Meeting Type Daily Rounds   Team Members Present   Team Members Present Physician;Nurse;   Physician Team Member Luc[facility]   [facility]   Care Management Team Member Noa   Patient/Family Present   Patient Present No   Patient's Family Present No     Pt denies S/HI/AVH. Pt is medication and meal compliant. Pt's discharge is pending group home with [facility].

## 2024-07-22 NOTE — PROGRESS NOTES
Progress Note - Behavioral Health     Franco Roberson 39 y.o. male MRN: [id number]   Unit/Bed#: U 344-01 Encounter: [id number]    Behavior over the last 24 hours: unchanged.     Franco is seen today for psychiatric follow up. Per nursing notes, patient denies SI/HI/AVH, med and meal compliant, attends groups, hopeful for placement.  Patient remains in behavioral control. No psych prns in last 24 hours.     Today Franco remains withdrawn to self, calm and cooperative. He reports less anxiety as he awaits for his placement, but is hopeful for discharge. He denies suicidal and homicidal ideations. He denies auditory and visual hallucinations when asked, and does not appear to be responding to internal stimuli. He notes he has some issues waking up at night and notes that his Melatonin was recently increased, which he says worked well the first night, will continue to monitor. Encouraged proper sleep hygiene, to which patient was receptive. He continues to go to groups. Denies any questions/concerns at this time.    Denies medication side effects. Awaiting placement.    Sleep:  interrupted sleep  Appetite: normal  Medication side effects: No   ROS: all other systems are negative    Mental Status Evaluation:    Appearance:  casually dressed, adequate grooming, looks stated age, unkempt hair   Behavior:  pleasant, cooperative, calm   Speech:  normal rate and volume, clear   Mood:  euthymic   Affect:  constricted   Thought Process:  goal directed, linear   Associations: concrete associations   Thought Content:  no overt delusions   Perceptual Disturbances: denies auditory hallucinations when asked, does not appear responding to internal stimuli, denies visual hallucinations when asked   Risk Potential: Suicidal ideation - None  Homicidal ideation - None  Potential for aggression - No   Sensorium:  oriented to person, place, and time/date   Memory:  recent and remote memory grossly intact   Consciousness:  alert and awake    Attention/Concentration: attention span and concentration are age appropriate   Insight:  limited   Judgment: limited   Gait/Station: normal gait/station   Motor Activity: no abnormal movements     Vital signs in last 24 hours:    Temp:  [96.8 °F (36 °C)-98 °F (36.7 °C)] 96.8 °F (36 °C)  HR:  [62-76] 62  Resp:  [16] 16  BP: (102-115)/(59-63) 115/63    Laboratory results: I have personally reviewed all pertinent laboratory/tests results    Results from the past 24 hours: No results found for this or any previous visit (from the past 24 hour(s)).    Progress Toward Goals: progressing, awaiting placement    Assessment & Plan   Principal Problem:    MDD (major depressive disorder), recurrent severe, without psychosis (HCC)  Active Problems:    Unspecified depressive disorder (HCC)    Medical clearance for psychiatric admission    Hyperlipidemia    Vitamin D deficiency    B12 deficiency    Hypothyroidism    Rule Out Autism spectrum disorder      Recommended Treatment:     Planned medication and treatment changes:    All current active medications have been reviewed  Encourage group therapy, milieu therapy and occupational therapy  Behavioral Health checks every 7 minutes  Continue current medications:  Awaiting placement.    Current Facility-Administered Medications   Medication Dose Route Frequency Provider Last Rate    acetaminophen  650 mg Oral Q6H PRN Basilio Brown MD      acetaminophen  650 mg Oral Q4H PRN Basilio Brown MD      acetaminophen  975 mg Oral Q6H PRN Basilio Brown MD      aluminum-magnesium hydroxide-simethicone  30 mL Oral Q4H PRN Basilio Brown MD      ARIPiprazole  5 mg Oral Daily Basilio Panda MD      Artificial Tears  1 drop Both Eyes Q3H PRN Basilio Brown MD      atorvastatin  10 mg Oral Daily With Dinner WALLY Baptiste      benztropine  1 mg Intramuscular Q4H PRN Max 6/day Basilio Brown MD      benztropine  1 mg Oral Q4H PRN Max 6/day  Basilio Brown MD      Cholecalciferol  2,000 Units Oral Daily WALLY Baptiste      cyanocobalamin  1,000 mcg Oral Daily WALLY Baptiste      Diclofenac Sodium  2 g Topical 4x Daily PRN WALLY Baptiste      hydrOXYzine HCL  50 mg Oral Q6H PRN Max 4/day Basilio Brown MD      Or    diphenhydrAMINE  50 mg Intramuscular Q6H PRN Basilio Brown MD      famotidine  20 mg Oral BID WALLY Baptiste      hydrOXYzine HCL  100 mg Oral Q6H PRN Max 4/day Basilio Brown MD      Or    LORazepam  2 mg Intramuscular Q6H PRN Basilio Brown MD      hydrOXYzine HCL  25 mg Oral Q6H PRN Max 4/day Basilio Brown MD      levothyroxine  37.5 mcg Oral Early Morning WALLY Baptiste      melatonin  6 mg Oral HS WALLY Gomez      methocarbamol  500 mg Oral Q6H PRN WALLY Baptiste      OLANZapine  5 mg Oral Q4H PRN Max 3/day Basilio Brown MD      Or    OLANZapine  2.5 mg Intramuscular Q4H PRN Max 3/day Basilio Brown MD      OLANZapine  5 mg Oral Q3H PRN Max 3/day Basilio Brown MD      Or    OLANZapine  5 mg Intramuscular Q3H PRN Max 3/day Basilio Brown MD      OLANZapine  2.5 mg Oral Q4H PRN Max 6/day Basilio Brown MD      polyethylene glycol  17 g Oral Daily PRN Basilio Brown MD      propranolol  10 mg Oral Q8H PRN Basilio Brown MD      senna-docusate sodium  1 tablet Oral Daily PRN Basilio Brown MD      sertraline  150 mg Oral Daily WALLY Gomez      traZODone  50 mg Oral HS PRN Basilio Brown MD       Risks / Benefits of Treatment:    Risks, benefits, and possible side effects of medications explained to patient and patient verbalizes understanding and agreement for treatment.    Counseling / Coordination of Care:    Patient's progress discussed with staff in treatment team meeting.  Medications, treatment progress and treatment plan reviewed with  patient.  Educated on importance of medication and treatment compliance.  Reassurance and supportive therapy provided.  Group attendance encouraged.    Zander Hurst PA-C 07/22/24

## 2024-07-23 PROCEDURE — 99232 SBSQ HOSP IP/OBS MODERATE 35: CPT

## 2024-07-23 RX ADMIN — CHOLECALCIFEROL TAB 25 MCG (1000 UNIT) 2000 UNITS: 25 TAB at 08:22

## 2024-07-23 RX ADMIN — ARIPIPRAZOLE 5 MG: 5 TABLET ORAL at 08:22

## 2024-07-23 RX ADMIN — MELATONIN TAB 3 MG 6 MG: 3 TAB at 21:32

## 2024-07-23 RX ADMIN — CYANOCOBALAMIN TAB 1000 MCG 1000 MCG: 1000 TAB at 08:22

## 2024-07-23 RX ADMIN — LEVOTHYROXINE SODIUM 37.5 MCG: 125 TABLET ORAL at 06:17

## 2024-07-23 RX ADMIN — FAMOTIDINE 20 MG: 20 TABLET ORAL at 08:22

## 2024-07-23 RX ADMIN — SERTRALINE HYDROCHLORIDE 150 MG: 100 TABLET ORAL at 08:22

## 2024-07-23 RX ADMIN — ATORVASTATIN CALCIUM 10 MG: 10 TABLET, FILM COATED ORAL at 17:43

## 2024-07-23 RX ADMIN — FAMOTIDINE 20 MG: 20 TABLET ORAL at 17:43

## 2024-07-23 NOTE — PLAN OF CARE
Problem: Ineffective Coping  Goal: Identifies ineffective coping skills  Outcome: Progressing  Goal: Identifies healthy coping skills  Outcome: Progressing  Goal: Demonstrates healthy coping skills  Outcome: Progressing  Goal: Participates in unit activities  Description: Interventions:  - Provide therapeutic environment   - Provide required programming   - Redirect inappropriate behaviors   Outcome: Progressing     Problem: Risk for Self Injury/Neglect  Goal: Treatment Goal: Remain safe during length of stay, learn and adopt new coping skills, and be free of self-injurious ideation, impulses and acts at the time of discharge  Outcome: Progressing     Problem: Depression  Goal: Treatment Goal: Demonstrate behavioral control of depressive symptoms, verbalize feelings of improved mood/affect, and adopt new coping skills prior to discharge  Outcome: Progressing  Goal: Verbalize thoughts and feelings  Description: Interventions:  - Assess and re-assess patient's level of risk   - Engage patient in 1:1 interactions, daily, for a minimum of 15 minutes   - Encourage patient to express feelings, fears, frustrations, hopes   Outcome: Progressing  Goal: Refrain from harming self  Description: Interventions:  - Monitor patient closely, per order   - Supervise medication ingestion, monitor effects and side effects   Outcome: Progressing  Goal: Refrain from isolation  Description: Interventions:  - Develop a trusting relationship   - Encourage socialization   Outcome: Progressing  Goal: Refrain from self-neglect  Outcome: Progressing  Goal: Attend and participate in unit activities, including therapeutic, recreational, and educational groups  Description: Interventions:  - Provide therapeutic and educational activities daily, encourage attendance and participation, and document same in the medical record   Outcome: Progressing  Goal: Complete daily ADLs, including personal hygiene independently, as able  Description:  Interventions:  - Observe, teach, and assist patient with ADLS  -  Monitor and promote a balance of rest/activity, with adequate nutrition and elimination   Outcome: Progressing     Problem: Depression  Goal: Treatment Goal: Demonstrate behavioral control of depressive symptoms, verbalize feelings of improved mood/affect, and adopt new coping skills prior to discharge  Outcome: Progressing  Goal: Verbalize thoughts and feelings  Description: Interventions:  - Assess and re-assess patient's level of risk   - Engage patient in 1:1 interactions, daily, for a minimum of 15 minutes   - Encourage patient to express feelings, fears, frustrations, hopes   Outcome: Progressing  Goal: Refrain from harming self  Description: Interventions:  - Monitor patient closely, per order   - Supervise medication ingestion, monitor effects and side effects   Outcome: Progressing  Goal: Refrain from isolation  Description: Interventions:  - Develop a trusting relationship   - Encourage socialization   Outcome: Progressing  Goal: Refrain from self-neglect  Outcome: Progressing  Goal: Attend and participate in unit activities, including therapeutic, recreational, and educational groups  Description: Interventions:  - Provide therapeutic and educational activities daily, encourage attendance and participation, and document same in the medical record   Outcome: Progressing  Goal: Complete daily ADLs, including personal hygiene independently, as able  Description: Interventions:  - Observe, teach, and assist patient with ADLS  -  Monitor and promote a balance of rest/activity, with adequate nutrition and elimination   Outcome: Progressing     Problem: Anxiety  Goal: Anxiety is at manageable level  Description: Interventions:  - Assess and monitor patient's anxiety level.   - Monitor for signs and symptoms (heart palpitations, chest pain, shortness of breath, headaches, nausea, feeling jumpy, restlessness, irritable, apprehensive).   -  Collaborate with interdisciplinary team and initiate plan and interventions as ordered.  - [place] patient to unit/surroundings  - Explain treatment plan  - Encourage participation in care  - Encourage verbalization of concerns/fears  - Identify coping mechanisms  - Assist in developing anxiety-reducing skills  - Administer/offer alternative therapies  - Limit or eliminate stimulants  Outcome: Progressing     Problem: DISCHARGE PLANNING - CARE MANAGEMENT  Goal: Discharge to post-acute care or home with appropriate resources  Description: INTERVENTIONS:  - Conduct assessment to determine patient/family and health care team treatment goals, and need for post-acute services based on payer coverage, community resources, and patient preferences, and barriers to discharge  - Address psychosocial, clinical, and financial barriers to discharge as identified in assessment in conjunction with the patient/family and health care team  - Arrange appropriate level of post-acute services according to patient’s   needs and preference and payer coverage in collaboration with the physician and health care team  - Communicate with and update the patient/family, physician, and health care team regarding progress on the discharge plan  - Arrange appropriate transportation to post-acute venues  Outcome: Progressing     Problem: Knowledge Deficit  Goal: Patient/family/caregiver demonstrates understanding of disease process, treatment plan, medications, and discharge instructions  Description: Complete learning assessment and assess knowledge base.  Interventions:  - Provide teaching at level of understanding  - Provide teaching via preferred learning methods  Outcome: Progressing     Problem: SLEEP DISTURBANCE  Goal: Will exhibit normal sleeping pattern  Description: Interventions:  -  Assess the patients sleep pattern, noting recent changes  - Administer medication as ordered  - Decrease environmental stimuli, including noise, as  appropriate during the night  - Encourage the patient to actively participate in unit groups and or exercise during the day to enhance ability to achieve adequate sleep at night  - Assess the patient, in the morning, encouraging a description of sleep experience  Outcome: Progressing

## 2024-07-23 NOTE — PROGRESS NOTES
Progress Note - Behavioral Health     Franco Roberson 39 y.o. male MRN: [id number]   Unit/Bed#: U 344-01 Encounter: [id number]    Behavior over the last 24 hours: unchanged.     Franco is seen today for psychiatric follow up. Per nursing notes, remains visible, calm and cooperative, med and meal compliant.  Patient remains in behavioral control. No psych prns in last 24 hours.     Today Franco remains withdrawn to self, but is seen pacing the unit and continues to go to groups. He reports sleeping better last night, but still had some awakenings. He reports he felt refreshed upon waking up this morning. He denies depression and anxiety and appears to be forward thinking today. Constricted affect. Calm and cooperative. Denies hallucinations when asked. Denies SI/HI.    Denies medication side effects and any questions/concerns at this time. Awaiting placement.    Sleep:  Slept better, but still with some awakenings.  Appetite: normal  Medication side effects: No   ROS: all other systems are negative    Mental Status Evaluation:    Appearance:  casually dressed, adequate grooming, looks stated age, seen walking in hallway in his own clothes , unkempt hair   Behavior:  pleasant, cooperative, calm   Speech:  normal rate and volume, clear   Mood:  euthymic   Affect:  constricted   Thought Process:  goal directed, linear   Associations: concrete associations   Thought Content:  no overt delusions   Perceptual Disturbances: denies auditory hallucinations when asked, does not appear responding to internal stimuli, denies visual hallucinations when asked   Risk Potential: Suicidal ideation - None  Homicidal ideation - None  Potential for aggression - No   Sensorium:  oriented to person, place, and time/date   Memory:  recent and remote memory grossly intact   Consciousness:  alert and awake   Attention/Concentration: attention span and concentration are age appropriate   Insight:  limited   Judgment: limited   Gait/Station:  normal gait/station   Motor Activity: no abnormal movements     Vital signs in last 24 hours:    Temp:  [96.9 °F (36.1 °C)-97.1 °F (36.2 °C)] 96.9 °F (36.1 °C)  HR:  [61-65] 61  Resp:  [16] 16  BP: (121-130)/(61-76) 121/61    Laboratory results: I have personally reviewed all pertinent laboratory/tests results    Results from the past 24 hours: No results found for this or any previous visit (from the past 24 hour(s)).    Progress Toward Goals: Awaiting placement. Slept better and feels refreshed today, however still some awakenings. Withdrawn to self.    Assessment & Plan   Principal Problem:    MDD (major depressive disorder), recurrent severe, without psychosis (HCC)  Active Problems:    Unspecified depressive disorder (HCC)    Medical clearance for psychiatric admission    Hyperlipidemia    Vitamin D deficiency    B12 deficiency    Hypothyroidism    Rule Out Autism spectrum disorder      Recommended Treatment:     Planned medication and treatment changes:    All current active medications have been reviewed  Encourage group therapy, milieu therapy and occupational therapy  Behavioral Health checks every 7 minutes  Continue current medications.  Awaiting placement.    Current Facility-Administered Medications   Medication Dose Route Frequency Provider Last Rate    acetaminophen  650 mg Oral Q6H PRN Basilio Brown MD      acetaminophen  650 mg Oral Q4H PRN Basilio Brown MD      acetaminophen  975 mg Oral Q6H PRN Basilio Brown MD      aluminum-magnesium hydroxide-simethicone  30 mL Oral Q4H PRN Basilio Brown MD      ARIPiprazole  5 mg Oral Daily Basilio Panda MD      Artificial Tears  1 drop Both Eyes Q3H PRN Basilio Brown MD      atorvastatin  10 mg Oral Daily With Dinner WALLY Baptiste      benztropine  1 mg Intramuscular Q4H PRN Max 6/day Basilio Brown MD      benztropine  1 mg Oral Q4H PRN Max 6/day Basilio Brown MD      Cholecalciferol  2,000 Units  Oral Daily Laura WALLY Olmos      cyanocobalamin  1,000 mcg Oral Daily Laura RomeWALLY Jacobson      Diclofenac Sodium  2 g Topical 4x Daily PRN WALLY Baptiste      hydrOXYzine HCL  50 mg Oral Q6H PRN Max 4/day Basilio Brown MD      Or    diphenhydrAMINE  50 mg Intramuscular Q6H PRN Basilio Brown MD      famotidine  20 mg Oral BID WALLY Baptiste      hydrOXYzine HCL  100 mg Oral Q6H PRN Max 4/day Basilio Brown MD      Or    LORazepam  2 mg Intramuscular Q6H PRN Basilio Brown MD      hydrOXYzine HCL  25 mg Oral Q6H PRN Max 4/day Basilio Brown MD      levothyroxine  37.5 mcg Oral Early Morning WALLY Baptiste      melatonin  6 mg Oral HS WALLY Gomez      methocarbamol  500 mg Oral Q6H PRN WALLY Baptiste      OLANZapine  5 mg Oral Q4H PRN Max 3/day Basilio Brown MD      Or    OLANZapine  2.5 mg Intramuscular Q4H PRN Max 3/day Basilio Brown MD      OLANZapine  5 mg Oral Q3H PRN Max 3/day Basilio Brown MD      Or    OLANZapine  5 mg Intramuscular Q3H PRN Max 3/day Basilio Brown MD      OLANZapine  2.5 mg Oral Q4H PRN Max 6/day Basilio Brown MD      polyethylene glycol  17 g Oral Daily PRN Basilio Brown MD      propranolol  10 mg Oral Q8H PRN Basilio Brown MD      senna-docusate sodium  1 tablet Oral Daily PRN Basilio Brown MD      sertraline  150 mg Oral Daily WALLY Gomez      traZODone  50 mg Oral HS PRN Basilio Brown MD       Risks / Benefits of Treatment:    Risks, benefits, and possible side effects of medications explained to patient and patient verbalizes understanding and agreement for treatment.    Counseling / Coordination of Care:    Patient's progress discussed with staff in treatment team meeting.  Medications, treatment progress and treatment plan reviewed with patient.  Educated on importance of medication and treatment  compliance.  Reassurance and supportive therapy provided.  Group attendance encouraged.    Zander Hurst PA-C 07/23/24

## 2024-07-23 NOTE — NURSING NOTE
Patient periodically about the unit. Quiet and to himself. Denies symptoms. Compliant with medications. Waiting placement.

## 2024-07-24 PROCEDURE — 99232 SBSQ HOSP IP/OBS MODERATE 35: CPT | Performed by: STUDENT IN AN ORGANIZED HEALTH CARE EDUCATION/TRAINING PROGRAM

## 2024-07-24 RX ADMIN — LEVOTHYROXINE SODIUM 37.5 MCG: 125 TABLET ORAL at 06:03

## 2024-07-24 RX ADMIN — ARIPIPRAZOLE 5 MG: 5 TABLET ORAL at 08:24

## 2024-07-24 RX ADMIN — MELATONIN TAB 3 MG 6 MG: 3 TAB at 21:34

## 2024-07-24 RX ADMIN — FAMOTIDINE 20 MG: 20 TABLET ORAL at 08:24

## 2024-07-24 RX ADMIN — SERTRALINE HYDROCHLORIDE 150 MG: 100 TABLET ORAL at 08:24

## 2024-07-24 RX ADMIN — FAMOTIDINE 20 MG: 20 TABLET ORAL at 17:21

## 2024-07-24 RX ADMIN — ATORVASTATIN CALCIUM 10 MG: 10 TABLET, FILM COATED ORAL at 17:21

## 2024-07-24 RX ADMIN — CHOLECALCIFEROL TAB 25 MCG (1000 UNIT) 2000 UNITS: 25 TAB at 08:25

## 2024-07-24 RX ADMIN — CYANOCOBALAMIN TAB 1000 MCG 1000 MCG: 1000 TAB at 08:24

## 2024-07-24 NOTE — NURSING NOTE
Patient visible in dayroom socializing with peers, denies all psychiatric symptoms at this time. Compliant with medication and unit routine, continual Q 7 minute rounding in place.

## 2024-07-24 NOTE — PROGRESS NOTES
"Progress Note - Behavioral Health   Franco Roberson 39 y.o. male MRN: [id number]  Unit/Bed#: U 344-01 Encounter: [id number]    Assessment & Plan   Principal Problem:    MDD (major depressive disorder), recurrent severe, without psychosis (HCC)  Active Problems:    Unspecified depressive disorder (HCC)    Medical clearance for psychiatric admission    Hyperlipidemia    Vitamin D deficiency    B12 deficiency    Hypothyroidism    Rule Out Autism spectrum disorder    Treatment Plan:  -Continue Abilify 5mg Daily  -Continue Sertraline 150mg Daily    Behavior over the last 24 hours:  unchanged  Sleep: normal  Appetite: normal  Medication side effects: No  ROS: no complaints    Subjective: Patient stating he is doing \"pretty good\". States that yesterday was good. States he liked playing games in the group. Patient endorsed tolerating medications well. Patient with no other complaints or points of discussion. Patient denied SI/HI/AVH.     Mental Status Evaluation:  Appearance:  age appropriate   Behavior:  Cooperative   Speech:  normal volume   Mood:  euthymic   Affect:  mood-congruent   Thought Process:  normal   Associations: intact associations   Thought Content:  normal   Perceptual Disturbances: None   Risk Potential: Suicidal Ideations none  Homicidal Ideations none  Potential for Aggression No   Sensorium:  person, place, time/date, and situation   Memory:  recent and remote memory grossly intact   Consciousness:  alert and awake    Attention: attention span and concentration were age appropriate   Insight:  limited   Judgment: limited   Gait/Station: normal gait/station   Motor Activity: no abnormal movements     Progress Toward Goals: Unchanged    Recommended Treatment: Continue with group therapy, milieu therapy and occupational therapy.      Risks, benefits and possible side effects of Medications:   Risks, benefits, and possible side effects of medications explained to patient and patient verbalizes understanding.  "     Medications: current meds:   Current Facility-Administered Medications   Medication Dose Route Frequency    acetaminophen (TYLENOL) tablet 650 mg  650 mg Oral Q6H PRN    acetaminophen (TYLENOL) tablet 650 mg  650 mg Oral Q4H PRN    acetaminophen (TYLENOL) tablet 975 mg  975 mg Oral Q6H PRN    aluminum-magnesium hydroxide-simethicone (MAALOX) oral suspension 30 mL  30 mL Oral Q4H PRN    ARIPiprazole (ABILIFY) tablet 5 mg  5 mg Oral Daily    Artificial Tears ophthalmic solution 1 drop  1 drop Both Eyes Q3H PRN    atorvastatin (LIPITOR) tablet 10 mg  10 mg Oral Daily With Dinner    benztropine (COGENTIN) injection 1 mg  1 mg Intramuscular Q4H PRN Max 6/day    benztropine (COGENTIN) tablet 1 mg  1 mg Oral Q4H PRN Max 6/day    Cholecalciferol (VITAMIN D3) tablet 2,000 Units  2,000 Units Oral Daily    cyanocobalamin (VITAMIN B-12) tablet 1,000 mcg  1,000 mcg Oral Daily    Diclofenac Sodium (VOLTAREN) 1 % topical gel 2 g  2 g Topical 4x Daily PRN    hydrOXYzine HCL (ATARAX) tablet 50 mg  50 mg Oral Q6H PRN Max 4/day    Or    diphenhydrAMINE (BENADRYL) injection 50 mg  50 mg Intramuscular Q6H PRN    famotidine (PEPCID) tablet 20 mg  20 mg Oral BID    hydrOXYzine HCL (ATARAX) tablet 100 mg  100 mg Oral Q6H PRN Max 4/day    Or    LORazepam (ATIVAN) injection 2 mg  2 mg Intramuscular Q6H PRN    hydrOXYzine HCL (ATARAX) tablet 25 mg  25 mg Oral Q6H PRN Max 4/day    levothyroxine tablet 37.5 mcg  37.5 mcg Oral Early Morning    melatonin tablet 6 mg  6 mg Oral HS    methocarbamol (ROBAXIN) tablet 500 mg  500 mg Oral Q6H PRN    OLANZapine (ZyPREXA) tablet 5 mg  5 mg Oral Q4H PRN Max 3/day    Or    OLANZapine (ZyPREXA) IM injection 2.5 mg  2.5 mg Intramuscular Q4H PRN Max 3/day    OLANZapine (ZyPREXA) tablet 5 mg  5 mg Oral Q3H PRN Max 3/day    Or    OLANZapine (ZyPREXA) IM injection 5 mg  5 mg Intramuscular Q3H PRN Max 3/day    OLANZapine (ZyPREXA) tablet 2.5 mg  2.5 mg Oral Q4H PRN Max 6/day    polyethylene glycol (MIRALAX)  packet 17 g  17 g Oral Daily PRN    propranolol (INDERAL) tablet 10 mg  10 mg Oral Q8H PRN    senna-docusate sodium (SENOKOT S) 8.6-50 mg per tablet 1 tablet  1 tablet Oral Daily PRN    sertraline (ZOLOFT) tablet 150 mg  150 mg Oral Daily    traZODone (DESYREL) tablet 50 mg  50 mg Oral HS PRN   .    Labs: I have personally reviewed all pertinent laboratory/tests results. Most Recent Labs:   Lab Results   Component Value Date    WBC 7.01 06/27/2024    RBC 4.54 06/27/2024    HGB 14.5 06/27/2024    HCT 44.4 06/27/2024     06/27/2024    RDW 12.0 06/27/2024    NEUTROABS 4.01 06/27/2024    SODIUM 139 07/10/2024    K 3.8 07/10/2024     07/10/2024    CO2 27 07/10/2024    BUN 16 07/10/2024    CREATININE 0.92 07/10/2024    GLUC 87 07/10/2024    GLUF 87 07/10/2024    CALCIUM 9.3 07/10/2024    AST 28 07/10/2024    ALT 36 07/10/2024    ALKPHOS 66 07/10/2024    TP 7.7 07/10/2024    ALB 4.4 07/10/2024    TBILI 0.55 07/10/2024    CHOLESTEROL 175 07/05/2024    HDL 53 07/05/2024    TRIG 99 07/05/2024    LDLCALC 102 (H) 07/05/2024    NONHDLC 122 07/05/2024    QHL4NYJGASGN 5.705 (H) 07/10/2024    FREET4 0.54 (L) 07/10/2024

## 2024-07-24 NOTE — PROGRESS NOTES
07/24/24 0840   Team Meeting   Meeting Type Daily Rounds   Team Members Present   Team Members Present Physician;Nurse;   Physician Team Member Lucero   [facility] Noa   Patient/Family Present   Patient Present No   Patient's Family Present No     Pt is medication and meal compliant. Pt's discharge is pending placement.

## 2024-07-25 PROCEDURE — 99232 SBSQ HOSP IP/OBS MODERATE 35: CPT | Performed by: STUDENT IN AN ORGANIZED HEALTH CARE EDUCATION/TRAINING PROGRAM

## 2024-07-25 RX ADMIN — CHOLECALCIFEROL TAB 25 MCG (1000 UNIT) 2000 UNITS: 25 TAB at 08:14

## 2024-07-25 RX ADMIN — FAMOTIDINE 20 MG: 20 TABLET ORAL at 17:44

## 2024-07-25 RX ADMIN — MELATONIN TAB 3 MG 6 MG: 3 TAB at 21:19

## 2024-07-25 RX ADMIN — SERTRALINE HYDROCHLORIDE 150 MG: 100 TABLET ORAL at 08:14

## 2024-07-25 RX ADMIN — ATORVASTATIN CALCIUM 10 MG: 10 TABLET, FILM COATED ORAL at 17:44

## 2024-07-25 RX ADMIN — FAMOTIDINE 20 MG: 20 TABLET ORAL at 08:14

## 2024-07-25 RX ADMIN — LEVOTHYROXINE SODIUM 37.5 MCG: 125 TABLET ORAL at 05:59

## 2024-07-25 RX ADMIN — CYANOCOBALAMIN TAB 1000 MCG 1000 MCG: 1000 TAB at 08:14

## 2024-07-25 RX ADMIN — ARIPIPRAZOLE 5 MG: 5 TABLET ORAL at 08:14

## 2024-07-25 NOTE — NURSING NOTE
Visible in the milieu at times. Quiet and to himself. Pleasant, calm and cooperative during interaction. Compliant with medications. Denies symptoms. No complaints. Waiting placement.

## 2024-07-25 NOTE — NURSING NOTE
Patient out of room on unit , appears withdrawn and depressed. Patient enjoys watching tv in small tv room. Denies depression SI/HI/AH/H at this time.Compliant with medication and routine vitals.

## 2024-07-25 NOTE — PLAN OF CARE
Problem: Ineffective Coping  Goal: Identifies ineffective coping skills  Outcome: Progressing  Goal: Identifies healthy coping skills  Outcome: Progressing  Goal: Demonstrates healthy coping skills  Outcome: Progressing  Goal: Participates in unit activities  Description: Interventions:  - Provide therapeutic environment   - Provide required programming   - Redirect inappropriate behaviors   Outcome: Progressing     Problem: Risk for Self Injury/Neglect  Goal: Treatment Goal: Remain safe during length of stay, learn and adopt new coping skills, and be free of self-injurious ideation, impulses and acts at the time of discharge  Outcome: Progressing     Problem: Depression  Goal: Treatment Goal: Demonstrate behavioral control of depressive symptoms, verbalize feelings of improved mood/affect, and adopt new coping skills prior to discharge  Outcome: Progressing  Goal: Verbalize thoughts and feelings  Description: Interventions:  - Assess and re-assess patient's level of risk   - Engage patient in 1:1 interactions, daily, for a minimum of 15 minutes   - Encourage patient to express feelings, fears, frustrations, hopes   Outcome: Progressing  Goal: Refrain from harming self  Description: Interventions:  - Monitor patient closely, per order   - Supervise medication ingestion, monitor effects and side effects   Outcome: Progressing  Goal: Refrain from isolation  Description: Interventions:  - Develop a trusting relationship   - Encourage socialization   Outcome: Progressing  Goal: Refrain from self-neglect  Outcome: Progressing  Goal: Attend and participate in unit activities, including therapeutic, recreational, and educational groups  Description: Interventions:  - Provide therapeutic and educational activities daily, encourage attendance and participation, and document same in the medical record   Outcome: Progressing  Goal: Complete daily ADLs, including personal hygiene independently, as able  Description:  Interventions:  - Observe, teach, and assist patient with ADLS  -  Monitor and promote a balance of rest/activity, with adequate nutrition and elimination   Outcome: Progressing     Problem: Anxiety  Goal: Anxiety is at manageable level  Description: Interventions:  - Assess and monitor patient's anxiety level.   - Monitor for signs and symptoms (heart palpitations, chest pain, shortness of breath, headaches, nausea, feeling jumpy, restlessness, irritable, apprehensive).   - Collaborate with interdisciplinary team and initiate plan and interventions as ordered.  - [place] patient to unit/surroundings  - Explain treatment plan  - Encourage participation in care  - Encourage verbalization of concerns/fears  - Identify coping mechanisms  - Assist in developing anxiety-reducing skills  - Administer/offer alternative therapies  - Limit or eliminate stimulants  Outcome: Progressing     Problem: DISCHARGE PLANNING - CARE MANAGEMENT  Goal: Discharge to post-acute care or home with appropriate resources  Description: INTERVENTIONS:  - Conduct assessment to determine patient/family and health care team treatment goals, and need for post-acute services based on payer coverage, community resources, and patient preferences, and barriers to discharge  - Address psychosocial, clinical, and financial barriers to discharge as identified in assessment in conjunction with the patient/family and health care team  - Arrange appropriate level of post-acute services according to patient’s   needs and preference and payer coverage in collaboration with the physician and health care team  - Communicate with and update the patient/family, physician, and health care team regarding progress on the discharge plan  - Arrange appropriate transportation to post-acute venues  Outcome: Progressing     Problem: Knowledge Deficit  Goal: Patient/family/caregiver demonstrates understanding of disease process, treatment plan, medications, and discharge  instructions  Description: Complete learning assessment and assess knowledge base.  Interventions:  - Provide teaching at level of understanding  - Provide teaching via preferred learning methods  Outcome: Progressing     Problem: SLEEP DISTURBANCE  Goal: Will exhibit normal sleeping pattern  Description: Interventions:  -  Assess the patients sleep pattern, noting recent changes  - Administer medication as ordered  - Decrease environmental stimuli, including noise, as appropriate during the night  - Encourage the patient to actively participate in unit groups and or exercise during the day to enhance ability to achieve adequate sleep at night  - Assess the patient, in the morning, encouraging a description of sleep experience  Outcome: Progressing

## 2024-07-25 NOTE — PROGRESS NOTES
07/25/24 0842   Team Meeting   Meeting Type Daily Rounds   Team Members Present   Team Members Present Physician;Nurse;   Physician Team Member Lucero   [facility] Noa   Patient/Family Present   Patient Present No   Patient's Family Present No     Pt is medication and meal compliant. Pt is visible on the unit. Pt's discharge is pending placement.

## 2024-07-25 NOTE — PROGRESS NOTES
Progress Note - Behavioral Health   Franco Roberson 39 y.o. male MRN: [id number]  Unit/Bed#: [facility] 344-01 Encounter: [id number]    Assessment & Plan   Principal Problem:    MDD (major depressive disorder), recurrent severe, without psychosis (HCC)  Active Problems:    Unspecified depressive disorder (HCC)    Medical clearance for psychiatric admission    Hyperlipidemia    Vitamin D deficiency    B12 deficiency    Hypothyroidism    Rule Out Autism spectrum disorder    Treatment Plan:  -Continue Abilify 5mg Daily  -Continue Sertralien1 50mg Daily    Behavior over the last 24 hours:  unchanged  Sleep: normal  Appetite: normal  Medication side effects: No  ROS: no complaints    Subjective: Patient stating that nothing is new. States he is feeling okay, had another normal day. Spoke with patient about his interests and things he does on the unit. Patient endorsed looking forward to music groups. Listens to a wide range of music. Prefers melodies versus percussion. States he also watches TV, whatever is on. Does not remember any specific programs he keeps track of. Patient denies SI/HI/AVH.    Mental Status Evaluation:  Appearance:  age appropriate   Behavior:  Cooperative   Speech:  normal volume   Mood:  euthymic   Affect:  mood-congruent   Thought Process:  goal directed and logical   Associations: intact associations   Thought Content:  normal   Perceptual Disturbances: None   Risk Potential: Suicidal Ideations none  Homicidal Ideations none  Potential for Aggression No   Sensorium:  person, place, time/date, and situation   Memory:  recent and remote memory grossly intact   Consciousness:  alert and awake    Attention: attention span and concentration were age appropriate   Insight:  limited   Judgment: limited   Gait/Station: normal gait/station   Motor Activity: no abnormal movements     Progress Toward Goals: Unchanged.    Recommended Treatment: Continue with group therapy, milieu therapy and occupational therapy.       Risks, benefits and possible side effects of Medications:   Risks, benefits, and possible side effects of medications explained to patient and patient verbalizes understanding.      Medications: current meds:   Current Facility-Administered Medications   Medication Dose Route Frequency    acetaminophen (TYLENOL) tablet 650 mg  650 mg Oral Q6H PRN    acetaminophen (TYLENOL) tablet 650 mg  650 mg Oral Q4H PRN    acetaminophen (TYLENOL) tablet 975 mg  975 mg Oral Q6H PRN    aluminum-magnesium hydroxide-simethicone (MAALOX) oral suspension 30 mL  30 mL Oral Q4H PRN    ARIPiprazole (ABILIFY) tablet 5 mg  5 mg Oral Daily    Artificial Tears ophthalmic solution 1 drop  1 drop Both Eyes Q3H PRN    atorvastatin (LIPITOR) tablet 10 mg  10 mg Oral Daily With Dinner    benztropine (COGENTIN) injection 1 mg  1 mg Intramuscular Q4H PRN Max 6/day    benztropine (COGENTIN) tablet 1 mg  1 mg Oral Q4H PRN Max 6/day    Cholecalciferol (VITAMIN D3) tablet 2,000 Units  2,000 Units Oral Daily    cyanocobalamin (VITAMIN B-12) tablet 1,000 mcg  1,000 mcg Oral Daily    Diclofenac Sodium (VOLTAREN) 1 % topical gel 2 g  2 g Topical 4x Daily PRN    hydrOXYzine HCL (ATARAX) tablet 50 mg  50 mg Oral Q6H PRN Max 4/day    Or    diphenhydrAMINE (BENADRYL) injection 50 mg  50 mg Intramuscular Q6H PRN    famotidine (PEPCID) tablet 20 mg  20 mg Oral BID    hydrOXYzine HCL (ATARAX) tablet 100 mg  100 mg Oral Q6H PRN Max 4/day    Or    LORazepam (ATIVAN) injection 2 mg  2 mg Intramuscular Q6H PRN    hydrOXYzine HCL (ATARAX) tablet 25 mg  25 mg Oral Q6H PRN Max 4/day    levothyroxine tablet 37.5 mcg  37.5 mcg Oral Early Morning    melatonin tablet 6 mg  6 mg Oral HS    methocarbamol (ROBAXIN) tablet 500 mg  500 mg Oral Q6H PRN    OLANZapine (ZyPREXA) tablet 5 mg  5 mg Oral Q4H PRN Max 3/day    Or    OLANZapine (ZyPREXA) IM injection 2.5 mg  2.5 mg Intramuscular Q4H PRN Max 3/day    OLANZapine (ZyPREXA) tablet 5 mg  5 mg Oral Q3H PRN Max 3/day    Or     OLANZapine (ZyPREXA) IM injection 5 mg  5 mg Intramuscular Q3H PRN Max 3/day    OLANZapine (ZyPREXA) tablet 2.5 mg  2.5 mg Oral Q4H PRN Max 6/day    polyethylene glycol (MIRALAX) packet 17 g  17 g Oral Daily PRN    propranolol (INDERAL) tablet 10 mg  10 mg Oral Q8H PRN    senna-docusate sodium (SENOKOT S) 8.6-50 mg per tablet 1 tablet  1 tablet Oral Daily PRN    sertraline (ZOLOFT) tablet 150 mg  150 mg Oral Daily    traZODone (DESYREL) tablet 50 mg  50 mg Oral HS PRN   .    Labs: I have personally reviewed all pertinent laboratory/tests results. Most Recent Labs:   Lab Results   Component Value Date    WBC 7.01 06/27/2024    RBC 4.54 06/27/2024    HGB 14.5 06/27/2024    HCT 44.4 06/27/2024     06/27/2024    RDW 12.0 06/27/2024    NEUTROABS 4.01 06/27/2024    SODIUM 139 07/10/2024    K 3.8 07/10/2024     07/10/2024    CO2 27 07/10/2024    BUN 16 07/10/2024    CREATININE 0.92 07/10/2024    GLUC 87 07/10/2024    GLUF 87 07/10/2024    CALCIUM 9.3 07/10/2024    AST 28 07/10/2024    ALT 36 07/10/2024    ALKPHOS 66 07/10/2024    TP 7.7 07/10/2024    ALB 4.4 07/10/2024    TBILI 0.55 07/10/2024    CHOLESTEROL 175 07/05/2024    HDL 53 07/05/2024    TRIG 99 07/05/2024    LDLCALC 102 (H) 07/05/2024    NONHDLC 122 07/05/2024    [id number] 5.705 (H) 07/10/2024    FREET4 0.54 (L) 07/10/2024

## 2024-07-26 PROCEDURE — 99232 SBSQ HOSP IP/OBS MODERATE 35: CPT | Performed by: STUDENT IN AN ORGANIZED HEALTH CARE EDUCATION/TRAINING PROGRAM

## 2024-07-26 RX ADMIN — CYANOCOBALAMIN TAB 1000 MCG 1000 MCG: 1000 TAB at 08:29

## 2024-07-26 RX ADMIN — MELATONIN TAB 3 MG 6 MG: 3 TAB at 21:27

## 2024-07-26 RX ADMIN — FAMOTIDINE 20 MG: 20 TABLET ORAL at 08:29

## 2024-07-26 RX ADMIN — CHOLECALCIFEROL TAB 25 MCG (1000 UNIT) 2000 UNITS: 25 TAB at 08:29

## 2024-07-26 RX ADMIN — FAMOTIDINE 20 MG: 20 TABLET ORAL at 17:15

## 2024-07-26 RX ADMIN — ARIPIPRAZOLE 5 MG: 5 TABLET ORAL at 08:29

## 2024-07-26 RX ADMIN — SERTRALINE HYDROCHLORIDE 150 MG: 100 TABLET ORAL at 08:29

## 2024-07-26 RX ADMIN — ATORVASTATIN CALCIUM 10 MG: 10 TABLET, FILM COATED ORAL at 17:15

## 2024-07-26 RX ADMIN — LEVOTHYROXINE SODIUM 37.5 MCG: 125 TABLET ORAL at 06:59

## 2024-07-26 NOTE — PLAN OF CARE
Problem: Ineffective Coping  Goal: Identifies ineffective coping skills  Outcome: Progressing  Goal: Identifies healthy coping skills  Outcome: Progressing  Goal: Demonstrates healthy coping skills  Outcome: Progressing  Goal: Participates in unit activities  Description: Interventions:  - Provide therapeutic environment   - Provide required programming   - Redirect inappropriate behaviors   Outcome: Progressing     Problem: Risk for Self Injury/Neglect  Goal: Treatment Goal: Remain safe during length of stay, learn and adopt new coping skills, and be free of self-injurious ideation, impulses and acts at the time of discharge  Outcome: Progressing     Problem: Depression  Goal: Treatment Goal: Demonstrate behavioral control of depressive symptoms, verbalize feelings of improved mood/affect, and adopt new coping skills prior to discharge  Outcome: Progressing  Goal: Verbalize thoughts and feelings  Description: Interventions:  - Assess and re-assess patient's level of risk   - Engage patient in 1:1 interactions, daily, for a minimum of 15 minutes   - Encourage patient to express feelings, fears, frustrations, hopes   Outcome: Progressing  Goal: Refrain from harming self  Description: Interventions:  - Monitor patient closely, per order   - Supervise medication ingestion, monitor effects and side effects   Outcome: Progressing  Goal: Refrain from isolation  Description: Interventions:  - Develop a trusting relationship   - Encourage socialization   Outcome: Progressing  Goal: Refrain from self-neglect  Outcome: Progressing  Goal: Attend and participate in unit activities, including therapeutic, recreational, and educational groups  Description: Interventions:  - Provide therapeutic and educational activities daily, encourage attendance and participation, and document same in the medical record   Outcome: Progressing  Goal: Complete daily ADLs, including personal hygiene independently, as able  Description:  Interventions:  - Observe, teach, and assist patient with ADLS  -  Monitor and promote a balance of rest/activity, with adequate nutrition and elimination   Outcome: Progressing     Problem: Anxiety  Goal: Anxiety is at manageable level  Description: Interventions:  - Assess and monitor patient's anxiety level.   - Monitor for signs and symptoms (heart palpitations, chest pain, shortness of breath, headaches, nausea, feeling jumpy, restlessness, irritable, apprehensive).   - Collaborate with interdisciplinary team and initiate plan and interventions as ordered.  - [place] patient to unit/surroundings  - Explain treatment plan  - Encourage participation in care  - Encourage verbalization of concerns/fears  - Identify coping mechanisms  - Assist in developing anxiety-reducing skills  - Administer/offer alternative therapies  - Limit or eliminate stimulants  Outcome: Progressing     Problem: DISCHARGE PLANNING - CARE MANAGEMENT  Goal: Discharge to post-acute care or home with appropriate resources  Description: INTERVENTIONS:  - Conduct assessment to determine patient/family and health care team treatment goals, and need for post-acute services based on payer coverage, community resources, and patient preferences, and barriers to discharge  - Address psychosocial, clinical, and financial barriers to discharge as identified in assessment in conjunction with the patient/family and health care team  - Arrange appropriate level of post-acute services according to patient’s   needs and preference and payer coverage in collaboration with the physician and health care team  - Communicate with and update the patient/family, physician, and health care team regarding progress on the discharge plan  - Arrange appropriate transportation to post-acute venues  Outcome: Progressing     Problem: Knowledge Deficit  Goal: Patient/family/caregiver demonstrates understanding of disease process, treatment plan, medications, and discharge  instructions  Description: Complete learning assessment and assess knowledge base.  Interventions:  - Provide teaching at level of understanding  - Provide teaching via preferred learning methods  Outcome: Progressing     Problem: SLEEP DISTURBANCE  Goal: Will exhibit normal sleeping pattern  Description: Interventions:  -  Assess the patients sleep pattern, noting recent changes  - Administer medication as ordered  - Decrease environmental stimuli, including noise, as appropriate during the night  - Encourage the patient to actively participate in unit groups and or exercise during the day to enhance ability to achieve adequate sleep at night  - Assess the patient, in the morning, encouraging a description of sleep experience  Outcome: Not Progressing

## 2024-07-26 NOTE — PROGRESS NOTES
07/26/24 0904   Team Meeting   Meeting Type Daily Rounds   Team Members Present   Team Members Present Physician;Nurse;   Physician Team Member Lucero   [facility]ursing Team Member [facility] Team Member Noa   Patient/Family Present   Patient Present No   Patient's Family Present No     Pt is med and meal compliant. Pt is calm and cooperative. Pt is pending discharge placement.

## 2024-07-26 NOTE — PROGRESS NOTES
Progress Note - Behavioral Health   Franco Roberson 39 y.o. male MRN: [id number]  Unit/Bed#: [facility] 344-01 Encounter: [id number]    Assessment & Plan   Principal Problem:    MDD (major depressive disorder), recurrent severe, without psychosis (HCC)  Active Problems:    Unspecified depressive disorder (HCC)    Medical clearance for psychiatric admission    Hyperlipidemia    Vitamin D deficiency    B12 deficiency    Hypothyroidism    Rule Out Autism spectrum disorder    Treatment Plan:  -Continue Abilify 5mg Daily  -Continue Sertraline 150mg Daily    Behavior over the last 24 hours:  improved  Sleep: insomnia  Appetite: normal  Medication side effects: No  ROS: no complaints    Subjective: Patient endorsed feeling good. Spoke about not getting enough sleep last night. States he woke up and took melatonin but it didn't feel like sleeping afterward. Denied any particular reasons why he could not sleep. Stated he felt restless. Otherwise, patient with no major complaints. Denied SI/HI/AVH.     Mental Status Evaluation:  Appearance:  age appropriate   Behavior:  Cooperative   Speech:  normal volume   Mood:  euthymic   Affect:  normal   Thought Process:  goal directed and logical   Associations: intact associations   Thought Content:  normal   Perceptual Disturbances: None   Risk Potential: Suicidal Ideations none  Homicidal Ideations none  Potential for Aggression No   Sensorium:  person, place, time/date, and situation   Memory:  recent and remote memory grossly intact   Consciousness:  alert and awake    Attention: attention span and concentration were age appropriate   Insight:  limited   Judgment: limited   Gait/Station: normal gait/station   Motor Activity: no abnormal movements     Progress Toward Goals: Unchanged    Recommended Treatment: Continue with group therapy, milieu therapy and occupational therapy.      Risks, benefits and possible side effects of Medications:   Risks, benefits, and possible side effects of  medications explained to patient and patient verbalizes understanding.      Medications: current meds:   Current Facility-Administered Medications   Medication Dose Route Frequency    acetaminophen (TYLENOL) tablet 650 mg  650 mg Oral Q6H PRN    acetaminophen (TYLENOL) tablet 650 mg  650 mg Oral Q4H PRN    acetaminophen (TYLENOL) tablet 975 mg  975 mg Oral Q6H PRN    aluminum-magnesium hydroxide-simethicone (MAALOX) oral suspension 30 mL  30 mL Oral Q4H PRN    ARIPiprazole (ABILIFY) tablet 5 mg  5 mg Oral Daily    Artificial Tears ophthalmic solution 1 drop  1 drop Both Eyes Q3H PRN    atorvastatin (LIPITOR) tablet 10 mg  10 mg Oral Daily With Dinner    benztropine (COGENTIN) injection 1 mg  1 mg Intramuscular Q4H PRN Max 6/day    benztropine (COGENTIN) tablet 1 mg  1 mg Oral Q4H PRN Max 6/day    Cholecalciferol (VITAMIN D3) tablet 2,000 Units  2,000 Units Oral Daily    cyanocobalamin (VITAMIN B-12) tablet 1,000 mcg  1,000 mcg Oral Daily    Diclofenac Sodium (VOLTAREN) 1 % topical gel 2 g  2 g Topical 4x Daily PRN    hydrOXYzine HCL (ATARAX) tablet 50 mg  50 mg Oral Q6H PRN Max 4/day    Or    diphenhydrAMINE (BENADRYL) injection 50 mg  50 mg Intramuscular Q6H PRN    famotidine (PEPCID) tablet 20 mg  20 mg Oral BID    hydrOXYzine HCL (ATARAX) tablet 100 mg  100 mg Oral Q6H PRN Max 4/day    Or    LORazepam (ATIVAN) injection 2 mg  2 mg Intramuscular Q6H PRN    hydrOXYzine HCL (ATARAX) tablet 25 mg  25 mg Oral Q6H PRN Max 4/day    levothyroxine tablet 37.5 mcg  37.5 mcg Oral Early Morning    melatonin tablet 6 mg  6 mg Oral HS    methocarbamol (ROBAXIN) tablet 500 mg  500 mg Oral Q6H PRN    OLANZapine (ZyPREXA) tablet 5 mg  5 mg Oral Q4H PRN Max 3/day    Or    OLANZapine (ZyPREXA) IM injection 2.5 mg  2.5 mg Intramuscular Q4H PRN Max 3/day    OLANZapine (ZyPREXA) tablet 5 mg  5 mg Oral Q3H PRN Max 3/day    Or    OLANZapine (ZyPREXA) IM injection 5 mg  5 mg Intramuscular Q3H PRN Max 3/day    OLANZapine (ZyPREXA) tablet  2.5 mg  2.5 mg Oral Q4H PRN Max 6/day    polyethylene glycol (MIRALAX) packet 17 g  17 g Oral Daily PRN    propranolol (INDERAL) tablet 10 mg  10 mg Oral Q8H PRN    senna-docusate sodium (SENOKOT S) 8.6-50 mg per tablet 1 tablet  1 tablet Oral Daily PRN    sertraline (ZOLOFT) tablet 150 mg  150 mg Oral Daily    traZODone (DESYREL) tablet 50 mg  50 mg Oral HS PRN   .    Labs: I have personally reviewed all pertinent laboratory/tests results. Most Recent Labs:   Lab Results   Component Value Date    WBC 7.01 06/27/2024    RBC 4.54 06/27/2024    HGB 14.5 06/27/2024    HCT 44.4 06/27/2024     06/27/2024    RDW 12.0 06/27/2024    NEUTROABS 4.01 06/27/2024    SODIUM 139 07/10/2024    K 3.8 07/10/2024     07/10/2024    CO2 27 07/10/2024    BUN 16 07/10/2024    CREATININE 0.92 07/10/2024    GLUC 87 07/10/2024    GLUF 87 07/10/2024    CALCIUM 9.3 07/10/2024    AST 28 07/10/2024    ALT 36 07/10/2024    ALKPHOS 66 07/10/2024    TP 7.7 07/10/2024    ALB 4.4 07/10/2024    TBILI 0.55 07/10/2024    CHOLESTEROL 175 07/05/2024    HDL 53 07/05/2024    TRIG 99 07/05/2024    LDLCALC 102 (H) 07/05/2024    NONHDLC 122 07/05/2024    AWL5PTOMTPBG 5.705 (H) 07/10/2024    FREET4 0.54 (L) 07/10/2024

## 2024-07-26 NOTE — NURSING NOTE
Compliant with medications. Pleasant, calm and cooperative during interaction. About the unit at times. Denies symptoms. Waiting placement.

## 2024-07-27 PROCEDURE — 99232 SBSQ HOSP IP/OBS MODERATE 35: CPT | Performed by: STUDENT IN AN ORGANIZED HEALTH CARE EDUCATION/TRAINING PROGRAM

## 2024-07-27 RX ADMIN — MELATONIN TAB 3 MG 6 MG: 3 TAB at 21:10

## 2024-07-27 RX ADMIN — FAMOTIDINE 20 MG: 20 TABLET ORAL at 08:51

## 2024-07-27 RX ADMIN — ATORVASTATIN CALCIUM 10 MG: 10 TABLET, FILM COATED ORAL at 17:38

## 2024-07-27 RX ADMIN — CYANOCOBALAMIN TAB 1000 MCG 1000 MCG: 1000 TAB at 08:51

## 2024-07-27 RX ADMIN — SERTRALINE HYDROCHLORIDE 150 MG: 100 TABLET ORAL at 08:51

## 2024-07-27 RX ADMIN — CHOLECALCIFEROL TAB 25 MCG (1000 UNIT) 2000 UNITS: 25 TAB at 08:52

## 2024-07-27 RX ADMIN — ARIPIPRAZOLE 5 MG: 5 TABLET ORAL at 08:51

## 2024-07-27 RX ADMIN — LEVOTHYROXINE SODIUM 37.5 MCG: 125 TABLET ORAL at 06:22

## 2024-07-27 RX ADMIN — FAMOTIDINE 20 MG: 20 TABLET ORAL at 17:38

## 2024-07-27 NOTE — PROGRESS NOTES
"Progress Note - Behavioral Health   Franco Roberson 39 y.o. male MRN: [id number]  Unit/Bed#: [facility] 344-01 Encounter: [id number]    All documentation, nursing notes, labs, and vitals reviewed.  The patient's medication reconciliation chart was also analyzed for medication adherence.  I personally evaluated Franco Roberson and discussed current care with treatment team.    No acute events overnight. Franco was evaluated at bedside today. He is pleasant and cooperative. He describes his MH state today as \"very good\". He is awaiting placement at the moment and endorses psychiatric stability. Franco's sleep and appetite are stable. He denies SI/HI when asked. His energy and motivation are adequate. No recent crying spells, anhedonia, or feelings of despair. Franco is hopeful and optimistic regarding his future. We processed his ASD diagnosis once again and Franco expressed gratitude regarding our care for him. Franco denies new onset anxiety or panic symptoms. He is not on-edge or tense today. During today's examination, Franco does not exhibit objective evidence of patrick/hypomania or psychosis. Franco denies medication concerns or side effects. Franco offers no further concerns.     Mental Status Evaluation:    Appearance:  age appropriate, marginal hygiene, looks stated age   Behavior:  pleasant, cooperative, calm   Speech:  normal volume, normal pitch   Mood:  \"Very good\"   Affect:  normal range and intensity, appropriate   Thought Process:  organized, logical, coherent   Associations: intact associations   Thought Content:  no overt delusions   Perceptual Disturbances: no auditory hallucinations, no visual hallucinations   Risk Potential: Suicidal ideation - None at present  Homicidal ideation - None at present  Potential for aggression - No   Sensorium:  oriented to person, place, and time/date   Memory:  recent and remote memory grossly intact   Consciousness:  alert and awake    Attention: attention span and concentration " are age appropriate   Insight:  good   Judgment: good   Gait/Station: in bed   Motor Activity: no abnormal movements       Assessment:     Principal Problem:    MDD (major depressive disorder), recurrent severe, without psychosis (HCC)  Active Problems:    Unspecified depressive disorder (HCC)    Medical clearance for psychiatric admission    Hyperlipidemia    Vitamin D deficiency    B12 deficiency    Hypothyroidism    Rule Out Autism spectrum disorder      Plan/Recommended Treatment:     - Continue with pharmacotherapy, group therapy, milieu therapy and occupational therapy.    - Risks/benefits/alternatives to treatment discussed and Franco Roberson continues to verbalize understanding   - No psychopharmacologic changes necessary at this juncture, continue scheduled psychotropic agents at current doses (see below)   - Will consider further optimization of psychotropic medication regimen as hospital course progresses   - Continue to assess for adverse medication side effects.  - Medical management as per SLIM recs  - Encourage Franco Roberson to participate in nonverbal forms of therapy including journaling and art/music therapy  - Continue precautionary Q7-minute safety checks.  - Continue to engage case management/SW to assist with collateral information, discharge planning, and the implementation of an individualized, patient-centered plan of care.  - The patient will be maintained on the following medications:    Current Facility-Administered Medications   Medication Dose Route Frequency Provider Last Rate    acetaminophen  650 mg Oral Q6H PRN Basilio Brown MD      acetaminophen  650 mg Oral Q4H PRN Basilio Brown MD      acetaminophen  975 mg Oral Q6H PRN Basilio Brown MD      aluminum-magnesium hydroxide-simethicone  30 mL Oral Q4H PRN Basilio Brown MD      ARIPiprazole  5 mg Oral Daily Basilio Panda MD      Artificial Tears  1 drop Both Eyes Q3H PRN Basilio Brown MD       atorvastatin  10 mg Oral Daily With Dinner WALLY Baptiste      benztropine  1 mg Intramuscular Q4H PRN Max 6/day Basilio Brown MD      benztropine  1 mg Oral Q4H PRN Max 6/day Basilio Brown MD      Cholecalciferol  2,000 Units Oral Daily Laura Clark [place], WALLY      cyanocobalamin  1,000 mcg Oral Daily Laura Clark WALLY [place]      Diclofenac Sodium  2 g Topical 4x Daily PRN LauraWALLY Tony      hydrOXYzine HCL  50 mg Oral Q6H PRN Max 4/day Basilio Brown MD      Or    diphenhydrAMINE  50 mg Intramuscular Q6H PRN Basilio Brown MD      famotidine  20 mg Oral BID Laura RomeWALLY Jacobson      hydrOXYzine HCL  100 mg Oral Q6H PRN Max 4/day Basilio Brown MD      Or    LORazepam  2 mg Intramuscular Q6H PRN Basilio Brown MD      hydrOXYzine HCL  25 mg Oral Q6H PRN Max 4/day Basilio Brown MD      levothyroxine  37.5 mcg Oral Early Morning Laura RomeWALLY Jacobson      melatonin  6 mg Oral HS WALLY Gomez      methocarbamol  500 mg Oral Q6H PRN WALLY Baptiste      OLANZapine  5 mg Oral Q4H PRN Max 3/day Basilio Brown MD      Or    OLANZapine  2.5 mg Intramuscular Q4H PRN Max 3/day Basilio Brown MD      OLANZapine  5 mg Oral Q3H PRN Max 3/day Basilio Brown MD      Or    OLANZapine  5 mg Intramuscular Q3H PRN Max 3/day Basilio Brown MD      OLANZapine  2.5 mg Oral Q4H PRN Max 6/day Basilio Brown MD      polyethylene glycol  17 g Oral Daily PRN Basilio Brown MD      propranolol  10 mg Oral Q8H PRN Basilio Brown MD      senna-docusate sodium  1 tablet Oral Daily PRN Basilio Brown MD      sertraline  150 mg Oral Daily WALLY Gomez      traZODone  50 mg Oral HS PRN Basilio Brown MD

## 2024-07-27 NOTE — PLAN OF CARE
Problem: Ineffective Coping  Goal: Identifies ineffective coping skills  Outcome: Progressing  Goal: Identifies healthy coping skills  Outcome: Progressing  Goal: Demonstrates healthy coping skills  Outcome: Progressing  Goal: Participates in unit activities  Description: Interventions:  - Provide therapeutic environment   - Provide required programming   - Redirect inappropriate behaviors   Outcome: Progressing     Problem: Risk for Self Injury/Neglect  Goal: Treatment Goal: Remain safe during length of stay, learn and adopt new coping skills, and be free of self-injurious ideation, impulses and acts at the time of discharge  Outcome: Progressing     Problem: Depression  Goal: Treatment Goal: Demonstrate behavioral control of depressive symptoms, verbalize feelings of improved mood/affect, and adopt new coping skills prior to discharge  Outcome: Progressing  Goal: Verbalize thoughts and feelings  Description: Interventions:  - Assess and re-assess patient's level of risk   - Engage patient in 1:1 interactions, daily, for a minimum of 15 minutes   - Encourage patient to express feelings, fears, frustrations, hopes   Outcome: Progressing  Goal: Refrain from harming self  Description: Interventions:  - Monitor patient closely, per order   - Supervise medication ingestion, monitor effects and side effects   Outcome: Progressing  Goal: Refrain from isolation  Description: Interventions:  - Develop a trusting relationship   - Encourage socialization   Outcome: Progressing  Goal: Refrain from self-neglect  Outcome: Progressing  Goal: Attend and participate in unit activities, including therapeutic, recreational, and educational groups  Description: Interventions:  - Provide therapeutic and educational activities daily, encourage attendance and participation, and document same in the medical record   Outcome: Progressing  Goal: Complete daily ADLs, including personal hygiene independently, as able  Description:  Interventions:  - Observe, teach, and assist patient with ADLS  -  Monitor and promote a balance of rest/activity, with adequate nutrition and elimination   Outcome: Progressing     Problem: Anxiety  Goal: Anxiety is at manageable level  Description: Interventions:  - Assess and monitor patient's anxiety level.   - Monitor for signs and symptoms (heart palpitations, chest pain, shortness of breath, headaches, nausea, feeling jumpy, restlessness, irritable, apprehensive).   - Collaborate with interdisciplinary team and initiate plan and interventions as ordered.  - [place] patient to unit/surroundings  - Explain treatment plan  - Encourage participation in care  - Encourage verbalization of concerns/fears  - Identify coping mechanisms  - Assist in developing anxiety-reducing skills  - Administer/offer alternative therapies  - Limit or eliminate stimulants  Outcome: Progressing     Problem: DISCHARGE PLANNING - CARE MANAGEMENT  Goal: Discharge to post-acute care or home with appropriate resources  Description: INTERVENTIONS:  - Conduct assessment to determine patient/family and health care team treatment goals, and need for post-acute services based on payer coverage, community resources, and patient preferences, and barriers to discharge  - Address psychosocial, clinical, and financial barriers to discharge as identified in assessment in conjunction with the patient/family and health care team  - Arrange appropriate level of post-acute services according to patient’s   needs and preference and payer coverage in collaboration with the physician and health care team  - Communicate with and update the patient/family, physician, and health care team regarding progress on the discharge plan  - Arrange appropriate transportation to post-acute venues  Outcome: Progressing     Problem: Knowledge Deficit  Goal: Patient/family/caregiver demonstrates understanding of disease process, treatment plan, medications, and discharge  instructions  Description: Complete learning assessment and assess knowledge base.  Interventions:  - Provide teaching at level of understanding  - Provide teaching via preferred learning methods  Outcome: Progressing     Problem: SLEEP DISTURBANCE  Goal: Will exhibit normal sleeping pattern  Description: Interventions:  -  Assess the patients sleep pattern, noting recent changes  - Administer medication as ordered  - Decrease environmental stimuli, including noise, as appropriate during the night  - Encourage the patient to actively participate in unit groups and or exercise during the day to enhance ability to achieve adequate sleep at night  - Assess the patient, in the morning, encouraging a description of sleep experience  Outcome: Not Progressing

## 2024-07-27 NOTE — NURSING NOTE
Patient out of room walking on the unit intermittently ,appears withdrawn and depressed. Patient denies depression and SI/HI/AH/VH at this time. Compliant with medications and routine vitas.

## 2024-07-27 NOTE — NURSING NOTE
Patient presents as withdrawn and somewhat disheveled, though he is requesting to cut his hair. He is calm, cooperative, pleasant upon approach. He denies SI, HI AVH. He rates anxiety 2/10 and depression 1/10 on 1-10 scale. He is ambulatory independently and steadily. Dressed in his own attire. Appetite is good and sleep he reports is improving. His affect is brighter than previously.

## 2024-07-28 PROCEDURE — 99232 SBSQ HOSP IP/OBS MODERATE 35: CPT | Performed by: STUDENT IN AN ORGANIZED HEALTH CARE EDUCATION/TRAINING PROGRAM

## 2024-07-28 RX ADMIN — FAMOTIDINE 20 MG: 20 TABLET ORAL at 08:30

## 2024-07-28 RX ADMIN — ARIPIPRAZOLE 5 MG: 5 TABLET ORAL at 08:30

## 2024-07-28 RX ADMIN — LEVOTHYROXINE SODIUM 37.5 MCG: 125 TABLET ORAL at 06:43

## 2024-07-28 RX ADMIN — FAMOTIDINE 20 MG: 20 TABLET ORAL at 17:24

## 2024-07-28 RX ADMIN — CHOLECALCIFEROL TAB 25 MCG (1000 UNIT) 2000 UNITS: 25 TAB at 08:30

## 2024-07-28 RX ADMIN — CYANOCOBALAMIN TAB 1000 MCG 1000 MCG: 1000 TAB at 08:30

## 2024-07-28 RX ADMIN — SERTRALINE HYDROCHLORIDE 150 MG: 100 TABLET ORAL at 08:30

## 2024-07-28 RX ADMIN — MELATONIN TAB 3 MG 6 MG: 3 TAB at 21:08

## 2024-07-28 RX ADMIN — ATORVASTATIN CALCIUM 10 MG: 10 TABLET, FILM COATED ORAL at 17:24

## 2024-07-28 NOTE — NURSING NOTE
Pt calm, visible and social with select peers. Denies SI/HI/AVH, complaint with medication administration and unit routine. Expressed excitement for future discharge, states anxiety & depression is at a all time low due to being in the waiting list for a group home. Denies any unmet needs at this time.

## 2024-07-28 NOTE — NURSING NOTE
Patient is visible intermittently, participating in groups with encouragement. He reports euthymic mood, denies SI, HI, AVH. Eye contacted remains poor and affect is still quite restrictive, though brighter than in the past. No behavioral concerns.

## 2024-07-28 NOTE — NURSING NOTE
"Pt visible on the milieu and social with select peers and staff.  Pt reports feeling \"great ever since I've know I'm on the waiting list\", while referring to placement after discharge.  Pt compliant with LIPITOR and PEPCID, denies HI/SI/AVH,  no remarkable behaviors observed.   "

## 2024-07-28 NOTE — PROGRESS NOTES
"Progress Note - Behavioral Health   Franco Roberson 39 y.o. male MRN: [id number]  Unit/Bed#: [facility] 344-01 Encounter: [id number]    All documentation, nursing notes, labs, and vitals reviewed.  The patient's medication reconciliation chart was also analyzed for medication adherence.  I personally evaluated Franco Roberson and discussed current care with treatment team.    No acute evens overnight. Franco endorses compliance with psychotropic medication regimen. He denies adverse medication side effects, aside from feeling \"a little drowsy\" secondary to his Melatonin. Acutely, Franco reports psychiatric stability. He rates his overall state of MH as \"8/10\" (10 being best, 0 being worst he ever felt). His denies SI/HI. His sleep and appetite are satisfactory. No recent crying spells or feelings of despair. Franco remains optimistic about his future and pending placement. He is not tense, on-edge, or restless today. No recent panic attacks. We did process emotions related to stagnation and unemployment. We discussed the power of purpose, routine, and structure and Franco's plans to potentially volunteer or work part-time upon discharge. Lastly, I did speak with him today about the need to maintain hygiene, to which he was receptive. During today's examination, Franco does not exhibit objective evidence of patrick/hypomania or psychosis. Franco denies A/V hallucinations, paranoia, ideas of reference, or delusional beliefs. Franco offers no further concerns.     Mental Status Evaluation:    Appearance:  marginal hygiene, looks older than stated age   Behavior:  pleasant, cooperative, calm, minimal eye contact   Speech:  clear, slow, soft   Mood:  \"Pretty good\"   Affect:  less constricted   Thought Process:  organized, logical, coherent, linear   Associations: intact associations   Thought Content:  no overt delusions   Perceptual Disturbances: no auditory hallucinations, no visual hallucinations   Risk Potential: Suicidal ideation - " None at present  Homicidal ideation - None at present  Potential for aggression - No   Sensorium:  oriented to person, place, and time/date   Memory:  recent and remote memory grossly intact   Consciousness:  alert and awake    Attention: attention span and concentration are age appropriate   Insight:  fair and improving   Judgment: fair and improving   Gait/Station: normal gait/station   Motor Activity: no abnormal movements       Assessment:     Principal Problem:    MDD (major depressive disorder), recurrent severe, without psychosis (HCC)  Active Problems:    Unspecified depressive disorder (HCC)    Medical clearance for psychiatric admission    Hyperlipidemia    Vitamin D deficiency    B12 deficiency    Hypothyroidism    Rule Out Autism spectrum disorder      Plan/Recommended Treatment:     - Continue with pharmacotherapy, group therapy, milieu therapy and occupational therapy.    - Risks/benefits/alternatives to treatment discussed and Franco Roberson continues to verbalize understanding   - No psychopharmacologic changes necessary at this juncture, continue scheduled psychotropic agents at current doses (see below)   - Will consider further optimization of psychotropic medication regimen as hospital course progresses   - Continue to assess for adverse medication side effects.  - Medical management as per SLIM recs  - Encourage Franco Roberson to participate in nonverbal forms of therapy including journaling and art/music therapy  - Continue precautionary Q7-minute safety checks.  - Continue to engage case management/SW to assist with collateral information, discharge planning, and the implementation of an individualized, patient-centered plan of care.  - The patient will be maintained on the following medications:    Current Facility-Administered Medications   Medication Dose Route Frequency Provider Last Rate    acetaminophen  650 mg Oral Q6H PRN Basilio Brown MD      acetaminophen  650 mg Oral Q4H PRN Basilio  Jose Boss MD      acetaminophen  975 mg Oral Q6H PRN Basilio Brown MD      aluminum-magnesium hydroxide-simethicone  30 mL Oral Q4H PRN Basilio Brown MD      ARIPiprazole  5 mg Oral Daily Basilio Panda MD      Artificial Tears  1 drop Both Eyes Q3H PRN Basilio Brown MD      atorvastatin  10 mg Oral Daily With Dinner WALLY Baptiste      benztropine  1 mg Intramuscular Q4H PRN Max 6/day Basilio Brown MD      benztropine  1 mg Oral Q4H PRN Max 6/day Basiilo Brown MD      Cholecalciferol  2,000 Units Oral Daily WALLY Baptiste      cyanocobalamin  1,000 mcg Oral Daily WALLY Baptiste      Diclofenac Sodium  2 g Topical 4x Daily PRN WALLY Baptiste      hydrOXYzine HCL  50 mg Oral Q6H PRN Max 4/day Basilio Brown MD      Or    diphenhydrAMINE  50 mg Intramuscular Q6H PRN Basilio Brown MD      famotidine  20 mg Oral BID WALLY Baptiste      hydrOXYzine HCL  100 mg Oral Q6H PRN Max 4/day Basilio Brown MD      Or    LORazepam  2 mg Intramuscular Q6H PRN Basilio Brown MD      hydrOXYzine HCL  25 mg Oral Q6H PRN Max 4/day Basilio Brown MD      levothyroxine  37.5 mcg Oral Early Morning WALLY Baptiste      melatonin  6 mg Oral HS Rachana Higgins, WALLY      methocarbamol  500 mg Oral Q6H PRN WALLY Baptiste      OLANZapine  5 mg Oral Q4H PRN Max 3/day Basilio Brown MD      Or    OLANZapine  2.5 mg Intramuscular Q4H PRN Max 3/day Basilio Brown MD      OLANZapine  5 mg Oral Q3H PRN Max 3/day Basilio Brown MD      Or    OLANZapine  5 mg Intramuscular Q3H PRN Max 3/day Basilio Brown MD      OLANZapine  2.5 mg Oral Q4H PRN Max 6/day Basilio Brown MD      polyethylene glycol  17 g Oral Daily PRN Basilio Brown MD      propranolol  10 mg Oral Q8H PRN Basilio Brown MD      senna-docusate sodium  1 tablet Oral Daily PRN Basilio Garcia  MD Karyn      sertraline  150 mg Oral Daily WALLY Gomez      traZODone  50 mg Oral HS PRN Basilio Brown MD

## 2024-07-29 PROCEDURE — 99232 SBSQ HOSP IP/OBS MODERATE 35: CPT | Performed by: PSYCHIATRY & NEUROLOGY

## 2024-07-29 RX ADMIN — LEVOTHYROXINE SODIUM 37.5 MCG: 125 TABLET ORAL at 06:13

## 2024-07-29 RX ADMIN — SERTRALINE HYDROCHLORIDE 150 MG: 100 TABLET ORAL at 08:09

## 2024-07-29 RX ADMIN — ARIPIPRAZOLE 5 MG: 5 TABLET ORAL at 08:09

## 2024-07-29 RX ADMIN — CHOLECALCIFEROL TAB 25 MCG (1000 UNIT) 2000 UNITS: 25 TAB at 08:09

## 2024-07-29 RX ADMIN — CYANOCOBALAMIN TAB 1000 MCG 1000 MCG: 1000 TAB at 08:09

## 2024-07-29 RX ADMIN — MELATONIN TAB 3 MG 6 MG: 3 TAB at 21:16

## 2024-07-29 RX ADMIN — ATORVASTATIN CALCIUM 10 MG: 10 TABLET, FILM COATED ORAL at 17:19

## 2024-07-29 RX ADMIN — FAMOTIDINE 20 MG: 20 TABLET ORAL at 08:09

## 2024-07-29 RX ADMIN — FAMOTIDINE 20 MG: 20 TABLET ORAL at 17:19

## 2024-07-29 NOTE — NURSING NOTE
Patient has been occasionally about the unit. Pleasant, calm and cooperative. Denies symptoms. Compliant with medications. Waiting placement.

## 2024-07-29 NOTE — PROGRESS NOTES
07/29/24 0854   Team Meeting   Meeting Type Daily Rounds   Team Members Present   Team Members Present Physician;Nurse;   Physician Team Member Lucero   [facility] Noa   Patient/Family Present   Patient Present No   Patient's Family Present No     Pt is medication and meal compliant. Pt denies SI/HI/AVH. Pt is pending discharge due to placement.

## 2024-07-29 NOTE — PLAN OF CARE
Pt regularly attends and participates in group therapy. Pt becoming more comfortable in social setting.

## 2024-07-29 NOTE — PROGRESS NOTES
Progress Note - Behavioral Health   Franco Roberson 39 y.o. male MRN: [id number]  Unit/Bed#: [facility] 344-01 Encounter: [id number]    Assessment & Plan   Principal Problem:    MDD (major depressive disorder), recurrent severe, without psychosis (HCC)  Active Problems:    Unspecified depressive disorder (HCC)    Medical clearance for psychiatric admission    Hyperlipidemia    Vitamin D deficiency    B12 deficiency    Hypothyroidism    Rule Out Autism spectrum disorder      Behavior over the last 24 hours:  unchanged  Sleep: Broken sleep  Appetite: normal  Medication side effects: No  ROS: no complaints    Subjective: The patient reports that he is doing okay.  He says that he has been waiting for placement.  He states that after the death of his mother in December he got evicted and was feeling very hopeless.  He reports that since he has been here he has been able to think about things.  He states that he was struggling with his suicidal thoughts and depression but now he has realized that he will be able to find a place to live eventually.  He states that he is also trying to work on getting himself stable so that he can do better when he is outside.  The patient states that his sleep still remains broken but he is able to get 7 to 8 hours of sleep.  His appetite remains good.  The patient states that he does not feel hopeless anymore.  He is not reporting any suicidal thinking or any helplessness.  He states that Zoloft has been helping him.  I also reviewed his chart and his lab work.  Reviewed his history.  He states that he has had suicidal thinking but no attempts in the past.  He states that usually the suicidal thinking was when he was younger.  The patient is not reporting any anxiety or panic attacks.  No behaviors like agitation or physical aggression have been reported.  The patient reported that he is participating in the activities.    Mental Status Evaluation:  Appearance:  disheveled   Behavior:  normal    Speech:  normal pitch and normal volume   Mood:  euthymic   Affect:  mood-congruent   Thought Process:  normal   Associations: intact associations   Thought Content:  normal   Perceptual Disturbances: None   Risk Potential: Suicidal Ideations none  Homicidal Ideations none  Potential for Aggression No   Sensorium:  person, place, time/date, and situation   Memory:  recent and remote memory grossly intact   Consciousness:  alert and awake    Attention: attention span and concentration were age appropriate   Insight:  age appropriate   Judgment: age appropriate   Gait/Station: normal gait/station   Motor Activity: no abnormal movements     Progress Toward Goals: Patient has made gradual progress towards his goals.  He is waiting for placement.  The patient states that initially he was struggling with the importance of life but he says that he feels he can manage now.  He does not have any suicidal thoughts.  Hopelessness is also decreased.    Recommended Treatment: Continue with group therapy, milieu therapy and occupational therapy.      Risks, benefits and possible side effects of Medications:   Risks, benefits, and possible side effects of medications explained to patient and patient verbalizes understanding.      Medications: all current active meds have been reviewed.    Labs: I have personally reviewed all pertinent laboratory/tests results. Most Recent Labs:   Lab Results   Component Value Date    WBC 7.01 06/27/2024    RBC 4.54 06/27/2024    HGB 14.5 06/27/2024    HCT 44.4 06/27/2024     06/27/2024    RDW 12.0 06/27/2024    NEUTROABS 4.01 06/27/2024    SODIUM 139 07/10/2024    K 3.8 07/10/2024     07/10/2024    CO2 27 07/10/2024    BUN 16 07/10/2024    CREATININE 0.92 07/10/2024    GLUC 87 07/10/2024    GLUF 87 07/10/2024    CALCIUM 9.3 07/10/2024    AST 28 07/10/2024    ALT 36 07/10/2024    ALKPHOS 66 07/10/2024    TP 7.7 07/10/2024    ALB 4.4 07/10/2024    TBILI 0.55 07/10/2024    CHOLESTEROL  175 07/05/2024    HDL 53 07/05/2024    TRIG 99 07/05/2024    LDLCALC 102 (H) 07/05/2024    NONHDLC 122 07/05/2024    VSJ9HGYWLFRA 5.705 (H) 07/10/2024    FREET4 0.54 (L) 07/10/2024       Counseling / Coordination of Care  Total floor / unit time spent today 25 minutes. Greater than 50% of total time was spent with the patient and / or family counseling and / or coordination of care. A description of the counseling / coordination of care: Discussed about his living situation.  Discussed about coping with the stressors.  Discussed about strengthening his coping skills.

## 2024-07-30 PROCEDURE — 99232 SBSQ HOSP IP/OBS MODERATE 35: CPT | Performed by: PSYCHIATRY & NEUROLOGY

## 2024-07-30 RX ADMIN — FAMOTIDINE 20 MG: 20 TABLET ORAL at 17:44

## 2024-07-30 RX ADMIN — ATORVASTATIN CALCIUM 10 MG: 10 TABLET, FILM COATED ORAL at 17:44

## 2024-07-30 RX ADMIN — MELATONIN TAB 3 MG 6 MG: 3 TAB at 22:17

## 2024-07-30 RX ADMIN — ARIPIPRAZOLE 5 MG: 5 TABLET ORAL at 08:27

## 2024-07-30 RX ADMIN — LEVOTHYROXINE SODIUM 37.5 MCG: 125 TABLET ORAL at 05:41

## 2024-07-30 RX ADMIN — SERTRALINE HYDROCHLORIDE 150 MG: 100 TABLET ORAL at 08:27

## 2024-07-30 RX ADMIN — CYANOCOBALAMIN TAB 1000 MCG 1000 MCG: 1000 TAB at 08:28

## 2024-07-30 RX ADMIN — CHOLECALCIFEROL TAB 25 MCG (1000 UNIT) 2000 UNITS: 25 TAB at 08:28

## 2024-07-30 RX ADMIN — FAMOTIDINE 20 MG: 20 TABLET ORAL at 08:28

## 2024-07-30 NOTE — PROGRESS NOTES
Progress Note - Behavioral Health     Franco Roberson 39 y.o. male MRN: [id number]   Unit/Bed#: [facility] 344-01 Encounter: [id number]    Behavior over the last 24 hours: unchanged.     Franco seen today, per staff report has been doing well On the unit.  He reports that overall his moods have been good.  He states that he is not feeling depressed.  He states that he is waiting for the placement.  The patient reports that he is taking his medication and participating in the program.  He states that currently he feels stable on current medication.  He is denying any depressed moods loss of interest motivation.  He does not report any hopelessness or helplessness.  He states that he is not hearing any voices or having any paranoid thoughts.  He has been getting along and has not been exhibiting agitation or any aggressive behaviors.  He also lacks his needs known to the staff.         Sleep: normal  Appetite: normal  Medication side effects: The patient denies having any side effects from the medication.      Mental Status Evaluation:    Appearance:  age appropriate   Behavior:  normal   Speech:  normal rate   Mood:  normal   Affect:  constricted   Thought Process:  coherent, goal directed   Associations: intact associations   Thought Content:  no overt delusions   Perceptual Disturbances: no auditory hallucinations   Risk Potential: Suicidal ideation - None at present  Homicidal ideation - None   Sensorium:  oriented to person, place, and time/date   Memory:  recent and remote memory grossly intact   Consciousness:  alert and awake   Attention: attention span and concentration are age appropriate   Insight:  good   Judgment: good   Gait/Station: normal gait/station   Motor Activity: no abnormal movements     Vital signs in last 24 hours:    Temp:  [97.5 °F (36.4 °C)-98 °F (36.7 °C)] 97.5 °F (36.4 °C)  HR:  [65-68] 65  Resp:  [16] 16  BP: (107-137)/(64-68) 107/68    Laboratory results: I have personally reviewed all pertinent  laboratory/tests results.  Most Recent Labs:   Lab Results   Component Value Date    WBC 7.01 06/27/2024    RBC 4.54 06/27/2024    HGB 14.5 06/27/2024    HCT 44.4 06/27/2024     06/27/2024    RDW 12.0 06/27/2024    NEUTROABS 4.01 06/27/2024    SODIUM 139 07/10/2024    K 3.8 07/10/2024     07/10/2024    CO2 27 07/10/2024    BUN 16 07/10/2024    CREATININE 0.92 07/10/2024    GLUC 87 07/10/2024    GLUF 87 07/10/2024    CALCIUM 9.3 07/10/2024    AST 28 07/10/2024    ALT 36 07/10/2024    ALKPHOS 66 07/10/2024    TP 7.7 07/10/2024    ALB 4.4 07/10/2024    TBILI 0.55 07/10/2024    CHOLESTEROL 175 07/05/2024    HDL 53 07/05/2024    TRIG 99 07/05/2024    LDLCALC 102 (H) 07/05/2024    NONHDLC 122 07/05/2024    [id number] 5.705 (H) 07/10/2024    FREET4 0.54 (L) 07/10/2024           Assessment & Plan   Principal Problem:    MDD (major depressive disorder), recurrent severe, without psychosis (HCC)  Active Problems:    Unspecified depressive disorder (HCC)    Medical clearance for psychiatric admission    Hyperlipidemia    Vitamin D deficiency    B12 deficiency    Hypothyroidism    Rule Out Autism spectrum disorder    Recommended Treatment:     Planned medication and treatment changes:      All current active medications have been reviewed  Encourage group therapy, milieu therapy and occupational therapy  Behavioral Health checks every 7 minutes  Current Facility-Administered Medications   Medication Dose Route Frequency Provider Last Rate    acetaminophen  650 mg Oral Q6H PRN Basilio Brown MD      acetaminophen  650 mg Oral Q4H PRN Basilio Brown MD      acetaminophen  975 mg Oral Q6H PRN Basilio Brown MD      aluminum-magnesium hydroxide-simethicone  30 mL Oral Q4H PRN Basilio Brown MD      ARIPiprazole  5 mg Oral Daily Basilio Panda MD      Artificial Tears  1 drop Both Eyes Q3H PRN Basilio Brown MD      atorvastatin  10 mg Oral Daily With Dinner Laura Amber Kormandy, CRNP       benztropine  1 mg Intramuscular Q4H PRN Max 6/day Basilio Brown MD      benztropine  1 mg Oral Q4H PRN Max 6/day Basilio Brown MD      Cholecalciferol  2,000 Units Oral Daily Laura Clark WALLY Ashford      cyanocobalamin  1,000 mcg Oral Daily Laura RomeWALLY Jacobson      Diclofenac Sodium  2 g Topical 4x Daily PRN WALLY Baptiste      hydrOXYzine HCL  50 mg Oral Q6H PRN Max 4/day Basilio Brown MD      Or    diphenhydrAMINE  50 mg Intramuscular Q6H PRN Basilio Brown MD      famotidine  20 mg Oral BID WALLY Baptiste      hydrOXYzine HCL  100 mg Oral Q6H PRN Max 4/day Basilio Brown MD      Or    LORazepam  2 mg Intramuscular Q6H PRN Basilio Brown MD      hydrOXYzine HCL  25 mg Oral Q6H PRN Max 4/day Basilio Brown MD      levothyroxine  37.5 mcg Oral Early Morning WALLY Baptiste      melatonin  6 mg Oral HS WALLY Gomez      methocarbamol  500 mg Oral Q6H PRN WALLY Baptiste      OLANZapine  5 mg Oral Q4H PRN Max 3/day Basilio Brown MD      Or    OLANZapine  2.5 mg Intramuscular Q4H PRN Max 3/day Basilio Brown MD      OLANZapine  5 mg Oral Q3H PRN Max 3/day Basilio Brown MD      Or    OLANZapine  5 mg Intramuscular Q3H PRN Max 3/day Basilio Brown MD      OLANZapine  2.5 mg Oral Q4H PRN Max 6/day Basilio Brown MD      polyethylene glycol  17 g Oral Daily PRN Basilio Brown MD      propranolol  10 mg Oral Q8H PRN Basilio Brown MD      senna-docusate sodium  1 tablet Oral Daily PRN Basilio Brown MD      sertraline  150 mg Oral Daily WALLY Gomez      traZODone  50 mg Oral HS PRN Basilio Brown MD         Risks / Benefits of Treatment:    Risks, benefits, and possible side effects of medications explained to patient and patient verbalizes understanding and agreement for treatment.    Counseling / Coordination of Care:    Patient's  progress discussed with staff in treatment team meeting.  Medications, treatment progress and treatment plan reviewed with patient.    Raj Guerrero MD 07/30/24

## 2024-07-30 NOTE — NURSING NOTE
3:40 PM  [de-identified] y.o. male presents with chief complaint of cough. Patient reports onset of 3 days. He notes accompanying congestion, wheezing, intermittent dyspnea, and excess mucus production. Pt also reports taking BP medication and coricidin today. Patient denies fever, chest pain, or emesis. I have evaluated the patient as the Provider in Triage. I have reviewed His vital signs and the triage nurse assessment. I have talked with the patient and any available family and advised that I am the provider in triage and have ordered the appropriate study to initiate their work up based on the clinical presentation during my assessment. I have advised that the patient will be accommodated in the Main ED as soon as possible. I have also requested to contact the triage nurse or myself immediately if the patient experiences any changes in their condition during this brief waiting period.     Note written by Mario Camargo, as dictated by Bette Keith MD 3:40 PM               Past Medical History:   Diagnosis Date    Constipation     Cyst of right kidney     froze    Enlarged prostate     Heart attack ([address]     Hemorrhoids     Hypertension        Past Surgical History:   Procedure Laterality Date    HX OTHER SURGICAL      right kidney - freeze         Family History:   Problem Relation Age of Onset    Hypertension Mother     Hypertension Father        Social History     Socioeconomic History    Marital status:      Spouse name: Not on file    Number of children: Not on file    Years of education: Not on file    Highest education level: Not on file   Social Needs    Financial resource strain: Not on file    Food insecurity - worry: Not on file    Food insecurity - inability: Not on file   [employer] needs - medical: Not on file   [employer] needs - non-medical: Not on file   Occupational History    Not on file   Tobacco Use    Smoking status: Passive Smoke Exposure Visible in the milieu at the start of the shift but more seclusive after eating breakfast. Resting in bed. Reports sleeping well last night. Compliant with medications. Denies symptoms. Waiting placement.    - Never Smoker    Smokeless tobacco: Never Used   Substance and Sexual Activity    Alcohol use: No    Drug use: No    Sexual activity: Not on file   Other Topics Concern    Not on file   Social History Narrative    Not on file         ALLERGIES: Patient has no known allergies. Review of Systems   Constitutional: Negative for fever. HENT: Positive for congestion. Eyes: Negative for visual disturbance. Respiratory: Positive for cough, shortness of breath and wheezing. Cardiovascular: Negative for chest pain and leg swelling. Gastrointestinal: Negative for abdominal pain, diarrhea, nausea and vomiting. Genitourinary: Negative for dysuria. Musculoskeletal: Negative. Negative for back pain and neck stiffness. Skin: Negative for rash. Neurological: Negative. Negative for syncope and headaches. Psychiatric/Behavioral: Negative for confusion. All other systems reviewed and are negative. Vitals:    01/05/19 1557   BP: (!) 191/94   Pulse: 82   Resp: 18   Temp: 98.2 °F (36.8 °C)   SpO2: 97%   Weight: 79.4 kg (175 lb)   Height: 5' 8\" (1.727 m)            Physical Exam   Constitutional: He appears well-developed and well-nourished. No distress. HENT:   Head: Normocephalic. Eyes: Pupils are equal, round, and reactive to light. Neck: Normal range of motion. Cardiovascular: Normal rate. No murmur heard. Pulmonary/Chest: Effort normal and breath sounds normal. No respiratory distress. Abdominal: Soft. There is no tenderness. Musculoskeletal: Normal range of motion. Neurological: He is alert. Skin: Skin is warm and dry. Capillary refill takes less than 2 seconds. Psychiatric: He has a normal mood and affect. His behavior is normal.      Note written by Mario Beatty, as dictated by Farshad Reza MD 3:40 PM      MDM       Procedures    Discussed test results, clinical impression,  and need for followup in 1-2 days if not improving.   Patients questions were answered. Discharge instructions given to patient.

## 2024-07-30 NOTE — NURSING NOTE
"Pt is visible, calm, pleasant and cooperative.  Pt continues to endorse positive feelings about placement following discharge.  Pt  denies HI/SI/AVH, and compliant with medications.  Pt reports a full night sleep but still feels \"slightly cloudy\".  Pt denies anxiety and depression,  and able to make needs known.  Pt is dressed in personal attire.  No remarkable behaviors observed.    "

## 2024-07-30 NOTE — PROGRESS NOTES
07/30/24 0827   Team Meeting   Meeting Type Daily Rounds   Team Members Present   Team Members Present Physician;Nurse;   Physician Team Member Lucero   [facility] Team Member Noa   Patient/Family Present   Patient Present No   Patient's Family Present No     Pt is medication and meal compliant. Pt denies all psych symptoms. Pt's discharge is pending placement.

## 2024-07-31 PROCEDURE — 99232 SBSQ HOSP IP/OBS MODERATE 35: CPT | Performed by: PSYCHIATRY & NEUROLOGY

## 2024-07-31 RX ADMIN — CHOLECALCIFEROL TAB 25 MCG (1000 UNIT) 2000 UNITS: 25 TAB at 08:17

## 2024-07-31 RX ADMIN — FAMOTIDINE 20 MG: 20 TABLET ORAL at 17:10

## 2024-07-31 RX ADMIN — ARIPIPRAZOLE 5 MG: 5 TABLET ORAL at 08:17

## 2024-07-31 RX ADMIN — FAMOTIDINE 20 MG: 20 TABLET ORAL at 08:18

## 2024-07-31 RX ADMIN — LEVOTHYROXINE SODIUM 37.5 MCG: 125 TABLET ORAL at 06:26

## 2024-07-31 RX ADMIN — MELATONIN TAB 3 MG 6 MG: 3 TAB at 22:52

## 2024-07-31 RX ADMIN — SERTRALINE HYDROCHLORIDE 150 MG: 100 TABLET ORAL at 08:17

## 2024-07-31 RX ADMIN — ATORVASTATIN CALCIUM 10 MG: 10 TABLET, FILM COATED ORAL at 17:10

## 2024-07-31 RX ADMIN — CYANOCOBALAMIN TAB 1000 MCG 1000 MCG: 1000 TAB at 08:18

## 2024-07-31 NOTE — PLAN OF CARE
Problem: Ineffective Coping  Goal: Identifies ineffective coping skills  Outcome: Progressing  Goal: Identifies healthy coping skills  Outcome: Progressing  Goal: Demonstrates healthy coping skills  Outcome: Progressing  Goal: Participates in unit activities  Description: Interventions:  - Provide therapeutic environment   - Provide required programming   - Redirect inappropriate behaviors   Outcome: Progressing     Problem: Risk for Self Injury/Neglect  Goal: Treatment Goal: Remain safe during length of stay, learn and adopt new coping skills, and be free of self-injurious ideation, impulses and acts at the time of discharge  Outcome: Progressing     Problem: Depression  Goal: Treatment Goal: Demonstrate behavioral control of depressive symptoms, verbalize feelings of improved mood/affect, and adopt new coping skills prior to discharge  Outcome: Progressing  Goal: Verbalize thoughts and feelings  Description: Interventions:  - Assess and re-assess patient's level of risk   - Engage patient in 1:1 interactions, daily, for a minimum of 15 minutes   - Encourage patient to express feelings, fears, frustrations, hopes   Outcome: Progressing  Goal: Refrain from harming self  Description: Interventions:  - Monitor patient closely, per order   - Supervise medication ingestion, monitor effects and side effects   Outcome: Progressing  Goal: Refrain from isolation  Description: Interventions:  - Develop a trusting relationship   - Encourage socialization   Outcome: Progressing  Goal: Refrain from self-neglect  Outcome: Progressing  Goal: Attend and participate in unit activities, including therapeutic, recreational, and educational groups  Description: Interventions:  - Provide therapeutic and educational activities daily, encourage attendance and participation, and document same in the medical record   Outcome: Progressing  Goal: Complete daily ADLs, including personal hygiene independently, as able  Description:  Interventions:  - Observe, teach, and assist patient with ADLS  -  Monitor and promote a balance of rest/activity, with adequate nutrition and elimination   Outcome: Progressing     Problem: Anxiety  Goal: Anxiety is at manageable level  Description: Interventions:  - Assess and monitor patient's anxiety level.   - Monitor for signs and symptoms (heart palpitations, chest pain, shortness of breath, headaches, nausea, feeling jumpy, restlessness, irritable, apprehensive).   - Collaborate with interdisciplinary team and initiate plan and interventions as ordered.  - [place] patient to unit/surroundings  - Explain treatment plan  - Encourage participation in care  - Encourage verbalization of concerns/fears  - Identify coping mechanisms  - Assist in developing anxiety-reducing skills  - Administer/offer alternative therapies  - Limit or eliminate stimulants  Outcome: Progressing     Problem: DISCHARGE PLANNING - CARE MANAGEMENT  Goal: Discharge to post-acute care or home with appropriate resources  Description: INTERVENTIONS:  - Conduct assessment to determine patient/family and health care team treatment goals, and need for post-acute services based on payer coverage, community resources, and patient preferences, and barriers to discharge  - Address psychosocial, clinical, and financial barriers to discharge as identified in assessment in conjunction with the patient/family and health care team  - Arrange appropriate level of post-acute services according to patient’s   needs and preference and payer coverage in collaboration with the physician and health care team  - Communicate with and update the patient/family, physician, and health care team regarding progress on the discharge plan  - Arrange appropriate transportation to post-acute venues  Outcome: Progressing     Problem: Knowledge Deficit  Goal: Patient/family/caregiver demonstrates understanding of disease process, treatment plan, medications, and discharge  instructions  Description: Complete learning assessment and assess knowledge base.  Interventions:  - Provide teaching at level of understanding  - Provide teaching via preferred learning methods  Outcome: Progressing     Problem: SLEEP DISTURBANCE  Goal: Will exhibit normal sleeping pattern  Description: Interventions:  -  Assess the patients sleep pattern, noting recent changes  - Administer medication as ordered  - Decrease environmental stimuli, including noise, as appropriate during the night  - Encourage the patient to actively participate in unit groups and or exercise during the day to enhance ability to achieve adequate sleep at night  - Assess the patient, in the morning, encouraging a description of sleep experience  Outcome: Progressing

## 2024-07-31 NOTE — PROGRESS NOTES
Progress Note - Behavioral Health     Franco Roberson 39 y.o. male MRN: [id number]   Unit/Bed#: U 344-01 Encounter: [id number]    Behavior over the last 24 hours: improving.     Franco seen today, per staff report has been compliant with his medication and meals.  He has not reported any suicidal homicidal ideas or any auditory or visual hallucination.  He is also reporting decreasing anxiety and depression On the unit.  I evaluated the patient.  The patient reports that he has been doing well.  He states that his moods have been much better.  He states that he is more optimistic about future.  He states that he is willing to go to a personal care home.  He states that since most of his symptoms have significantly decreased he believes that he is ready to live in a group home or a personal care home.  He denied any current symptoms denied any hopelessness helplessness.  He denied any thoughts of self-harm or harm to others.  He states that sleep is occasionally a problem where he gets up frequently but he goes back to sleep.  He states that he is not worrying excessively.         Sleep: slept off and on  Appetite: normal  Medication side effects: The patient reports no side effects.        Mental Status Evaluation:    Appearance:  age appropriate, marginal hygiene   Behavior:  pleasant, cooperative, calm   Speech:  normal rate, normal volume, normal pitch   Mood:  euthymic   Affect:  constricted   Thought Process:  organized, logical   Associations: intact associations   Thought Content:  no overt delusions   Perceptual Disturbances: none   Risk Potential: Suicidal ideation - None  Homicidal ideation - None   Sensorium:  oriented to person, place, and time/date   Memory:  recent and remote memory grossly intact   Consciousness:  alert and awake   Attention: attention span and concentration are age appropriate   Insight:  good   Judgment: good   Gait/Station: normal gait/station   Motor Activity: no abnormal movements      Vital signs in last 24 hours:    Temp:  [97.5 °F (36.4 °C)-98.2 °F (36.8 °C)] 97.5 °F (36.4 °C)  HR:  [57-78] 57  Resp:  [16] 16  BP: (109-116)/(62-67) 109/67    Laboratory results: I have personally reviewed all pertinent laboratory/tests results.  Most Recent Labs:   Lab Results   Component Value Date    WBC 7.01 06/27/2024    RBC 4.54 06/27/2024    HGB 14.5 06/27/2024    HCT 44.4 06/27/2024     06/27/2024    RDW 12.0 06/27/2024    NEUTROABS 4.01 06/27/2024    SODIUM 139 07/10/2024    K 3.8 07/10/2024     07/10/2024    CO2 27 07/10/2024    BUN 16 07/10/2024    CREATININE 0.92 07/10/2024    GLUC 87 07/10/2024    GLUF 87 07/10/2024    CALCIUM 9.3 07/10/2024    AST 28 07/10/2024    ALT 36 07/10/2024    ALKPHOS 66 07/10/2024    TP 7.7 07/10/2024    ALB 4.4 07/10/2024    TBILI 0.55 07/10/2024    CHOLESTEROL 175 07/05/2024    HDL 53 07/05/2024    TRIG 99 07/05/2024    LDLCALC 102 (H) 07/05/2024    NONHDLC 122 07/05/2024    FLL0GTTAVICV 5.705 (H) 07/10/2024    FREET4 0.54 (L) 07/10/2024           Assessment & Plan   Principal Problem:    MDD (major depressive disorder), recurrent severe, without psychosis (HCC)  Active Problems:    Unspecified depressive disorder (HCC)    Medical clearance for psychiatric admission    Hyperlipidemia    Vitamin D deficiency    B12 deficiency    Hypothyroidism    Rule Out Autism spectrum disorder    Recommended Treatment:     Planned medication and treatment changes:      All current active medications have been reviewed  Encourage group therapy, milieu therapy and occupational therapy  Behavioral Health checks every 7 minutes  Current Facility-Administered Medications   Medication Dose Route Frequency Provider Last Rate    acetaminophen  650 mg Oral Q6H PRN Basilio Brown MD      acetaminophen  650 mg Oral Q4H PRN Basilio Brown MD      acetaminophen  975 mg Oral Q6H PRN Basilio Brown MD      aluminum-magnesium hydroxide-simethicone  30 mL Oral Q4H PRN  Basilio Brown MD      ARIPiprazole  5 mg Oral Daily Basilio Panda MD      Artificial Tears  1 drop Both Eyes Q3H PRN Basilio Brown MD      atorvastatin  10 mg Oral Daily With Dinner WALLY Baptiste      benztropine  1 mg Intramuscular Q4H PRN Max 6/day Basilio Brown MD      benztropine  1 mg Oral Q4H PRN Max 6/day Basilio Brown MD      Cholecalciferol  2,000 Units Oral Daily WALLY Baptiste      cyanocobalamin  1,000 mcg Oral Daily WALLY Baptiste      Diclofenac Sodium  2 g Topical 4x Daily PRN WALLY Baptiste      hydrOXYzine HCL  50 mg Oral Q6H PRN Max 4/day Basilio Brown MD      Or    diphenhydrAMINE  50 mg Intramuscular Q6H PRN Basilio Brown MD      famotidine  20 mg Oral BID WALLY Baptiste      hydrOXYzine HCL  100 mg Oral Q6H PRN Max 4/day Basilio Brown MD      Or    LORazepam  2 mg Intramuscular Q6H PRN Basilio Brown MD      hydrOXYzine HCL  25 mg Oral Q6H PRN Max 4/day Basilio Brown MD      levothyroxine  37.5 mcg Oral Early Morning WALLY Baptiste      melatonin  6 mg Oral HS Rachana Salimayasir Higgins, WALLY      methocarbamol  500 mg Oral Q6H PRN WALLY Baptiste      OLANZapine  5 mg Oral Q4H PRN Max 3/day Basilio Brown MD      Or    OLANZapine  2.5 mg Intramuscular Q4H PRN Max 3/day Basilio Brown MD      OLANZapine  5 mg Oral Q3H PRN Max 3/day Basilio Brown MD      Or    OLANZapine  5 mg Intramuscular Q3H PRN Max 3/day Basilio Brown MD      OLANZapine  2.5 mg Oral Q4H PRN Max 6/day Basilio Brown MD      polyethylene glycol  17 g Oral Daily PRN Basilio Brown MD      propranolol  10 mg Oral Q8H PRN Basilio Brown MD      senna-docusate sodium  1 tablet Oral Daily PRN Basilio Brown MD      sertraline  150 mg Oral Daily WALLY Gomez      traZODone  50 mg Oral HS PRN Basilio Brown MD         Risks  / Benefits of Treatment:    Risks, benefits, and possible side effects of medications explained to patient and patient verbalizes understanding and agreement for treatment.    Counseling / Coordination of Care:    Patient's progress discussed with staff in treatment team meeting.  Medications, treatment progress and treatment plan reviewed with patient.  Discussed if the patient had any concerns about his placement.  Discussed about importance of hygiene after he is discharged and how to maintain good habits.  Discussed about his sleep pattern and how the environment in the hospital is affecting his sleep.    Raj Guerrero MD 07/31/24

## 2024-07-31 NOTE — NURSING NOTE
Patient visible in dayroom socializing with peers, denies all psychiatric symptoms at this time. Compliant with medication and unit routine. Continual Q 7 minute rounding in place, no unmet needs at this time.

## 2024-07-31 NOTE — PROGRESS NOTES
07/31/24 0842   Team Meeting   Meeting Type Daily Rounds   Team Members Present   Team Members Present Physician;Nurse;   Physician Team Member Lucero   [facility] Noa   Patient/Family Present   Patient Present No   Patient's Family Present No     Pt is visible, calm, cooperative. Pt's discharge is pending placement. Pt is medication and meal compliant.

## 2024-07-31 NOTE — NURSING NOTE
Patient denies SI, HI, AHs and VHs. Denies anxiety and depression. Patient is medication and meal compliant. Poor eye contact, ate breakfast in common area with peers. He denies any needs at this time.

## 2024-08-01 PROCEDURE — 99232 SBSQ HOSP IP/OBS MODERATE 35: CPT | Performed by: PSYCHIATRY & NEUROLOGY

## 2024-08-01 RX ADMIN — ATORVASTATIN CALCIUM 10 MG: 10 TABLET, FILM COATED ORAL at 17:19

## 2024-08-01 RX ADMIN — CHOLECALCIFEROL TAB 25 MCG (1000 UNIT) 2000 UNITS: 25 TAB at 08:32

## 2024-08-01 RX ADMIN — LEVOTHYROXINE SODIUM 37.5 MCG: 125 TABLET ORAL at 06:52

## 2024-08-01 RX ADMIN — CYANOCOBALAMIN TAB 1000 MCG 1000 MCG: 1000 TAB at 08:32

## 2024-08-01 RX ADMIN — FAMOTIDINE 20 MG: 20 TABLET ORAL at 08:32

## 2024-08-01 RX ADMIN — FAMOTIDINE 20 MG: 20 TABLET ORAL at 17:19

## 2024-08-01 RX ADMIN — SERTRALINE HYDROCHLORIDE 150 MG: 100 TABLET ORAL at 08:32

## 2024-08-01 RX ADMIN — ARIPIPRAZOLE 5 MG: 5 TABLET ORAL at 08:32

## 2024-08-01 NOTE — NURSING NOTE
Pt about unit but quiet and to self. Attends group and listens to music with peers. Denies SI/HI/AVH. Appears depressed but brightens in conversation. Making phone calls and appropriate. Pt requested to sleep on mattress in small tv room tonight and will be allowed as he has been having a problem with sleep over an extended period. He is often up walking the halls for hours or sitting at a table reading at night. Pt does not like to take PRN trazodone b/c it causes daytime drowsiness. No other concerns noted. Compliant with care. Safety checks continue.

## 2024-08-01 NOTE — NURSING NOTE
Pt is visible on the unit and socially appropriate with peers and staff, although mostly withdrawn to self. Able to make needs known. Denies SI/HI/AH/VH. Medication and meal compliant. Denies any unmet needs or complaints at this time.

## 2024-08-01 NOTE — SOCIAL WORK
"Cm called [facility] to follow up referral. They reported that the wait list is years not and reported that due to Pt having no income he would not qualify for the alternative program available.  The worker encouraged Cm to call [facility]  Department and inform them on Pt's situation of having a serious mental illness along with Autism spectrum disorder. Pt's caregiver was his Mother and she passed away. Pt was evicted from his his home and essentially has no where to go. Pt does not have the life skills or ability to live independently or alone.   From there they can either have the options of sending Pt to [facility] which they only fund about 6 beds in the [facility] or put Pt in a hotel room.   Then inform the county that this would not work for Pt and if they could look at a referral to \"[facility]".  This is a [facility].    From there Pt could live there and do a PHP program through either [facility] [phone] or [facility] [phone].    "

## 2024-08-01 NOTE — PLAN OF CARE
Problem: Ineffective Coping  Goal: Identifies ineffective coping skills  Outcome: Progressing  Goal: Identifies healthy coping skills  Outcome: Progressing  Goal: Demonstrates healthy coping skills  Outcome: Progressing  Goal: Participates in unit activities  Description: Interventions:  - Provide therapeutic environment   - Provide required programming   - Redirect inappropriate behaviors   Outcome: Progressing     Problem: Risk for Self Injury/Neglect  Goal: Treatment Goal: Remain safe during length of stay, learn and adopt new coping skills, and be free of self-injurious ideation, impulses and acts at the time of discharge  Outcome: Progressing     Problem: Depression  Goal: Treatment Goal: Demonstrate behavioral control of depressive symptoms, verbalize feelings of improved mood/affect, and adopt new coping skills prior to discharge  Outcome: Progressing  Goal: Verbalize thoughts and feelings  Description: Interventions:  - Assess and re-assess patient's level of risk   - Engage patient in 1:1 interactions, daily, for a minimum of 15 minutes   - Encourage patient to express feelings, fears, frustrations, hopes   Outcome: Progressing  Goal: Refrain from harming self  Description: Interventions:  - Monitor patient closely, per order   - Supervise medication ingestion, monitor effects and side effects   Outcome: Progressing  Goal: Refrain from isolation  Description: Interventions:  - Develop a trusting relationship   - Encourage socialization   Outcome: Progressing  Goal: Refrain from self-neglect  Outcome: Progressing  Goal: Attend and participate in unit activities, including therapeutic, recreational, and educational groups  Description: Interventions:  - Provide therapeutic and educational activities daily, encourage attendance and participation, and document same in the medical record   Outcome: Progressing  Goal: Complete daily ADLs, including personal hygiene independently, as able  Description:  Interventions:  - Observe, teach, and assist patient with ADLS  -  Monitor and promote a balance of rest/activity, with adequate nutrition and elimination   Outcome: Progressing     Problem: Anxiety  Goal: Anxiety is at manageable level  Description: Interventions:  - Assess and monitor patient's anxiety level.   - Monitor for signs and symptoms (heart palpitations, chest pain, shortness of breath, headaches, nausea, feeling jumpy, restlessness, irritable, apprehensive).   - Collaborate with interdisciplinary team and initiate plan and interventions as ordered.  - [place] patient to unit/surroundings  - Explain treatment plan  - Encourage participation in care  - Encourage verbalization of concerns/fears  - Identify coping mechanisms  - Assist in developing anxiety-reducing skills  - Administer/offer alternative therapies  - Limit or eliminate stimulants  Outcome: Progressing     Problem: DISCHARGE PLANNING - CARE MANAGEMENT  Goal: Discharge to post-acute care or home with appropriate resources  Description: INTERVENTIONS:  - Conduct assessment to determine patient/family and health care team treatment goals, and need for post-acute services based on payer coverage, community resources, and patient preferences, and barriers to discharge  - Address psychosocial, clinical, and financial barriers to discharge as identified in assessment in conjunction with the patient/family and health care team  - Arrange appropriate level of post-acute services according to patient’s   needs and preference and payer coverage in collaboration with the physician and health care team  - Communicate with and update the patient/family, physician, and health care team regarding progress on the discharge plan  - Arrange appropriate transportation to post-acute venues  Outcome: Progressing     Problem: Knowledge Deficit  Goal: Patient/family/caregiver demonstrates understanding of disease process, treatment plan, medications, and discharge  instructions  Description: Complete learning assessment and assess knowledge base.  Interventions:  - Provide teaching at level of understanding  - Provide teaching via preferred learning methods  Outcome: Progressing     Problem: SLEEP DISTURBANCE  Goal: Will exhibit normal sleeping pattern  Description: Interventions:  -  Assess the patients sleep pattern, noting recent changes  - Administer medication as ordered  - Decrease environmental stimuli, including noise, as appropriate during the night  - Encourage the patient to actively participate in unit groups and or exercise during the day to enhance ability to achieve adequate sleep at night  - Assess the patient, in the morning, encouraging a description of sleep experience  Outcome: Progressing

## 2024-08-01 NOTE — PROGRESS NOTES
08/01/24 0837   Team Meeting   Meeting Type Daily Rounds   Team Members Present   Team Members Present Physician;Nurse;   Physician Team Member Lucero   [facility]   Care Management Team Member Noa   Patient/Family Present   Patient Present No   Patient's Family Present No     Pt is medication and meal compliant. Pt slept throughout the night in the small TV room over night. Pt denies SI/HI/AVH. Pt's discharge is pending placement.

## 2024-08-01 NOTE — PROGRESS NOTES
Progress Note - Behavioral Health     Franco Roberson 39 y.o. male MRN: [id number]   Unit/Bed#: [facility] 345-02 Encounter: [id number]    Behavior over the last 24 hours: improved.     Franco seen today, per staff report has been visible on the unit and socially appropriate with peers and staff but remains withdrawn to self. on the unit.  I examined the patient in the unit.  The patient reported that he has been doing well and is not having any current problems.  He states that he still remains optimistic about future placement and states that he will do better.  He also reported that he was able to sleep better last night because his room was changed and did not get disturbed by the roommate.  The patient reports no suicidal thoughts he is not reporting any hopelessness helplessness.  No behaviors have been reported.  He has been tolerating the medication fairly well.        Sleep: improved  Appetite: normal  Medication side effects: None reported      Mental Status Evaluation:    Appearance:  age appropriate   Behavior:  pleasant, cooperative   Speech:  normal rate, normal volume, normal pitch   Mood:  euthymic   Affect:  constricted   Thought Process:  organized, logical   Associations: intact associations   Thought Content:  no overt delusions   Perceptual Disturbances: no auditory hallucinations   Risk Potential: Suicidal ideation - None  Homicidal ideation - None   Sensorium:  oriented to person, place, and time/date   Memory:  recent and remote memory grossly intact   Consciousness:  alert and awake   Attention: attention span and concentration are age appropriate   Insight:  good   Judgment: good   Gait/Station: normal gait/station   Motor Activity: no abnormal movements     Vital signs in last 24 hours:    Temp:  [97.4 °F (36.3 °C)] 97.4 °F (36.3 °C)  HR:  [56-68] 56  Resp:  [16] 16  BP: (113-123)/(68-69) 113/68    Laboratory results: I have personally reviewed all pertinent laboratory/tests results.        Assessment  & Plan   Principal Problem:    MDD (major depressive disorder), recurrent severe, without psychosis (HCC)  Active Problems:    Unspecified depressive disorder (HCC)    Medical clearance for psychiatric admission    Hyperlipidemia    Vitamin D deficiency    B12 deficiency    Hypothyroidism    Rule Out Autism spectrum disorder    Recommended Treatment:     Planned medication and treatment changes:      All current active medications have been reviewed  Encourage group therapy, milieu therapy and occupational therapy  Behavioral Health checks every 7 minutes  Current Facility-Administered Medications   Medication Dose Route Frequency Provider Last Rate    acetaminophen  650 mg Oral Q6H PRN Basilio Brown MD      acetaminophen  650 mg Oral Q4H PRN Basilio Brown MD      acetaminophen  975 mg Oral Q6H PRN Basilio Brown MD      aluminum-magnesium hydroxide-simethicone  30 mL Oral Q4H PRN Basilio Brown MD      ARIPiprazole  5 mg Oral Daily Basilio Panda MD      Artificial Tears  1 drop Both Eyes Q3H PRN Basilio Brown MD      atorvastatin  10 mg Oral Daily With Dinner WALLY Baptiste      benztropine  1 mg Intramuscular Q4H PRN Max 6/day Basilio Brown MD      benztropine  1 mg Oral Q4H PRN Max 6/day Basilio Brown MD      Cholecalciferol  2,000 Units Oral Daily WALLY Baptiste      cyanocobalamin  1,000 mcg Oral Daily WALLY Baptiste      Diclofenac Sodium  2 g Topical 4x Daily PRN WALLY Baptiste      hydrOXYzine HCL  50 mg Oral Q6H PRN Max 4/day Basilio Brown MD      Or    diphenhydrAMINE  50 mg Intramuscular Q6H PRN Basilio Brown MD      famotidine  20 mg Oral BID WALLY Baptiset      hydrOXYzine HCL  100 mg Oral Q6H PRN Max 4/day Basilio Brown MD      Or    LORazepam  2 mg Intramuscular Q6H PRN Basilio Brown MD      hydrOXYzine HCL  25 mg Oral Q6H PRN Max 4/day Basilio Brown MD       levothyroxine  37.5 mcg Oral Early Morning WALLY Baptiste      melatonin  6 mg Oral HS WALLY Gomez      methocarbamol  500 mg Oral Q6H PRN WALLY Baptiste      OLANZapine  5 mg Oral Q4H PRN Max 3/day Basilio Brown MD      Or    OLANZapine  2.5 mg Intramuscular Q4H PRN Max 3/day Basilio Brown MD      OLANZapine  5 mg Oral Q3H PRN Max 3/day Basilio Brown MD      Or    OLANZapine  5 mg Intramuscular Q3H PRN Max 3/day Basilio Brown MD      OLANZapine  2.5 mg Oral Q4H PRN Max 6/day Basilio Brown MD      polyethylene glycol  17 g Oral Daily PRN Basilio Brown MD      propranolol  10 mg Oral Q8H PRN Basilio Brown MD      senna-docusate sodium  1 tablet Oral Daily PRN Basilio Brown MD      sertraline  150 mg Oral Daily WALLY Gomez      traZODone  50 mg Oral HS PRN Basilio Brown MD         Risks / Benefits of Treatment:    Risks, benefits, and possible side effects of medications explained to patient and patient verbalizes understanding and agreement for treatment.    Counseling / Coordination of Care:    Patient's progress discussed with staff in treatment team meeting.  Medications, treatment progress and treatment plan reviewed with patient.  Discussed about the placement and the plans.    Raj Guerrero MD 08/01/24

## 2024-08-02 PROCEDURE — 99232 SBSQ HOSP IP/OBS MODERATE 35: CPT | Performed by: PSYCHIATRY & NEUROLOGY

## 2024-08-02 RX ADMIN — SERTRALINE HYDROCHLORIDE 150 MG: 100 TABLET ORAL at 08:09

## 2024-08-02 RX ADMIN — LEVOTHYROXINE SODIUM 37.5 MCG: 125 TABLET ORAL at 06:35

## 2024-08-02 RX ADMIN — ARIPIPRAZOLE 5 MG: 5 TABLET ORAL at 08:09

## 2024-08-02 RX ADMIN — FAMOTIDINE 20 MG: 20 TABLET ORAL at 17:02

## 2024-08-02 RX ADMIN — CYANOCOBALAMIN TAB 1000 MCG 1000 MCG: 1000 TAB at 08:09

## 2024-08-02 RX ADMIN — ATORVASTATIN CALCIUM 10 MG: 10 TABLET, FILM COATED ORAL at 16:58

## 2024-08-02 RX ADMIN — CHOLECALCIFEROL TAB 25 MCG (1000 UNIT) 2000 UNITS: 25 TAB at 08:09

## 2024-08-02 RX ADMIN — FAMOTIDINE 20 MG: 20 TABLET ORAL at 08:09

## 2024-08-02 RX ADMIN — MELATONIN TAB 3 MG 6 MG: 3 TAB at 23:31

## 2024-08-02 RX ADMIN — MELATONIN TAB 3 MG 6 MG: 3 TAB at 00:30

## 2024-08-02 NOTE — NURSING NOTE
"Pt visible, pleasant and cooperative throughout evening. Pt minimally social unless approached by staff. Pt denies SI/HI/AH/VH and needs. Pt asking to take scheduled melatonin \"later.\"  "

## 2024-08-02 NOTE — PROGRESS NOTES
Progress Note - Behavioral Health     Franco Roberson 39 y.o. male MRN: [id number]   Unit/Bed#: [facility] 345-02 Encounter: [id number]    Behavior over the last 24 hours: slowly improving.     Franco seen today, per staff report has been quite into himself.  Denies any symptoms.  Sleeping well for the past 2 nights since his room has been changed on the unit.  I saw the patient in face-to-face send I have also reviewed the notes.  The patient continues to participate in the activities.  His hygiene however remains marginal.  He is currently not reporting any symptoms.  He is not reporting any depressed moods or loss of interest motivation.  No behaviors like aggression or agitation were noted or reported.  The patient is not reporting any thoughts of self-harm or harm to others.  He states that he has been talking to his friend sometimes.  He states that he is still waiting for the placement.         Sleep: normal  Appetite: normal  Medication side effects: None reported      Mental Status Evaluation:    Appearance:  age appropriate   Behavior:  pleasant, cooperative, calm   Speech:  normal rate, normal volume, normal pitch   Mood:  normal   Affect:  constricted   Thought Process:  logical   Associations: intact associations   Thought Content:  no overt delusions   Perceptual Disturbances: none   Risk Potential: Suicidal ideation - None  Homicidal ideation - None   Sensorium:  oriented to person, place, and time/date   Memory:  recent and remote memory grossly intact   Consciousness:  alert and awake   Attention: attention span and concentration are age appropriate   Insight:  age appropriate   Judgment: good   Gait/Station: normal gait/station   Motor Activity: no abnormal movements     Vital signs in last 24 hours:    Temp:  [97.2 °F (36.2 °C)-97.7 °F (36.5 °C)] 97.7 °F (36.5 °C)  HR:  [61-64] 64  Resp:  [16] 16  BP: (110-117)/(64-70) 110/70    Laboratory results: I have personally reviewed all pertinent laboratory/tests  results.        Assessment & Plan   Principal Problem:    MDD (major depressive disorder), recurrent severe, without psychosis (HCC)  Active Problems:    Unspecified depressive disorder (HCC)    Medical clearance for psychiatric admission    Hyperlipidemia    Vitamin D deficiency    B12 deficiency    Hypothyroidism    Rule Out Autism spectrum disorder    Recommended Treatment:     Planned medication and treatment changes:      All current active medications have been reviewed  Encourage group therapy, milieu therapy and occupational therapy  Behavioral Health checks every 7 minutes  Current Facility-Administered Medications   Medication Dose Route Frequency Provider Last Rate    acetaminophen  650 mg Oral Q6H PRN Basilio Brown MD      acetaminophen  650 mg Oral Q4H PRN Basilio Brown MD      acetaminophen  975 mg Oral Q6H PRN Basilio Brown MD      aluminum-magnesium hydroxide-simethicone  30 mL Oral Q4H PRN Basilio Brown MD      ARIPiprazole  5 mg Oral Daily Basilio Panda MD      Artificial Tears  1 drop Both Eyes Q3H PRN Basilio Brown MD      atorvastatin  10 mg Oral Daily With Dinner WALLY Baptiste      benztropine  1 mg Intramuscular Q4H PRN Max 6/day Basilio Brown MD      benztropine  1 mg Oral Q4H PRN Max 6/day Basilio Brown MD      Cholecalciferol  2,000 Units Oral Daily WALLY Baptiste      cyanocobalamin  1,000 mcg Oral Daily WALLY Baptiste      Diclofenac Sodium  2 g Topical 4x Daily PRN WALLY Baptiste      hydrOXYzine HCL  50 mg Oral Q6H PRN Max 4/day Basilio Brown MD      Or    diphenhydrAMINE  50 mg Intramuscular Q6H PRN Basilio Brown MD      famotidine  20 mg Oral BID WALLY Baptiste      hydrOXYzine HCL  100 mg Oral Q6H PRN Max 4/day Basilio Brown MD      Or    LORazepam  2 mg Intramuscular Q6H PRN Basilio Brown MD      hydrOXYzine HCL  25 mg Oral Q6H PRN Max  4/day Basilio Brown MD      levothyroxine  37.5 mcg Oral Early Morning WALLY Baptiste      melatonin  6 mg Oral HS WALLY Gomez      methocarbamol  500 mg Oral Q6H PRN WALLY Baptitse      OLANZapine  5 mg Oral Q4H PRN Max 3/day Basilio Brown MD      Or    OLANZapine  2.5 mg Intramuscular Q4H PRN Max 3/day Basilio Brown MD      OLANZapine  5 mg Oral Q3H PRN Max 3/day Basilio Brown MD      Or    OLANZapine  5 mg Intramuscular Q3H PRN Max 3/day Basilio Brown MD      OLANZapine  2.5 mg Oral Q4H PRN Max 6/day Basilio Brown MD      polyethylene glycol  17 g Oral Daily PRN Basilio Brown MD      propranolol  10 mg Oral Q8H PRN Basilio Brown MD      senna-docusate sodium  1 tablet Oral Daily PRN Basilio Brown MD      sertraline  150 mg Oral Daily WALLY Gomez      traZODone  50 mg Oral HS PRN Basilio Brown MD         Risks / Benefits of Treatment:    Risks, benefits, and possible side effects of medications explained to patient and patient verbalizes understanding and agreement for treatment.    Counseling / Coordination of Care:    Patient's progress discussed with staff in treatment team meeting.  Medications, treatment progress and treatment plan reviewed with patient.  Discussed about his social support system he says that he does have friends but no other family members.  He states that he does occasionally talk to his friend.  Encourage participation in the activities.    Raj Guerrero MD 08/02/24

## 2024-08-02 NOTE — PLAN OF CARE
Progress Notes by Ellie Shaffer CMA at 01/24/17 12:23 PM     Author:  Ellie Shaffer CMA Service:  (none) Author Type:  Certified Medical Assistant     Filed:  01/24/17 12:24 PM Encounter Date:  1/17/2017 Status:  Signed     :  Ellie Shaffer CMA (Certified Medical Assistant)            Left message on answering machine to call back[CH1.1T] to get appointment scheduled with Endocrinology Department.[CH1.1M]        Revision History        User Key Date/Time User Provider Type Action    > CH1.1 01/24/17 12:24 PM Ellie Shaffer CMA Certified Medical Assistant Sign    M - Manual, T - Template             Pt regularly attends and participates in group therapy. Pt is cooperative and appropriate.

## 2024-08-02 NOTE — PROGRESS NOTES
08/02/24 0822   Team Meeting   Meeting Type Daily Rounds   Team Members Present   Team Members Present Physician;Nurse;   Physician Team Member Lucero   [facility]   Care Management Team Member Noa   Patient/Family Present   Patient Present No   Patient's Family Present No     Pt is medication and meal compliant. Pt reported he has been sleeping better now that he had a room switch. Pt is pending discharge for placement.

## 2024-08-02 NOTE — NURSING NOTE
About the unit. Quiet and to himself. Denies symptoms. Reports sleeping well last night ever since making a room change due to his roommate snoring loudly. Waiting placement.

## 2024-08-02 NOTE — PLAN OF CARE
Problem: Ineffective Coping  Goal: Identifies ineffective coping skills  Outcome: Progressing  Goal: Identifies healthy coping skills  Outcome: Progressing  Goal: Demonstrates healthy coping skills  Outcome: Progressing  Goal: Participates in unit activities  Description: Interventions:  - Provide therapeutic environment   - Provide required programming   - Redirect inappropriate behaviors   Outcome: Progressing     Problem: Risk for Self Injury/Neglect  Goal: Treatment Goal: Remain safe during length of stay, learn and adopt new coping skills, and be free of self-injurious ideation, impulses and acts at the time of discharge  Outcome: Progressing     Problem: Depression  Goal: Treatment Goal: Demonstrate behavioral control of depressive symptoms, verbalize feelings of improved mood/affect, and adopt new coping skills prior to discharge  Outcome: Progressing  Goal: Verbalize thoughts and feelings  Description: Interventions:  - Assess and re-assess patient's level of risk   - Engage patient in 1:1 interactions, daily, for a minimum of 15 minutes   - Encourage patient to express feelings, fears, frustrations, hopes   Outcome: Progressing  Goal: Refrain from harming self  Description: Interventions:  - Monitor patient closely, per order   - Supervise medication ingestion, monitor effects and side effects   Outcome: Progressing  Goal: Refrain from isolation  Description: Interventions:  - Develop a trusting relationship   - Encourage socialization   Outcome: Progressing  Goal: Refrain from self-neglect  Outcome: Progressing  Goal: Attend and participate in unit activities, including therapeutic, recreational, and educational groups  Description: Interventions:  - Provide therapeutic and educational activities daily, encourage attendance and participation, and document same in the medical record   Outcome: Progressing  Goal: Complete daily ADLs, including personal hygiene independently, as able  Description:  Interventions:  - Observe, teach, and assist patient with ADLS  -  Monitor and promote a balance of rest/activity, with adequate nutrition and elimination   Outcome: Progressing     Problem: Anxiety  Goal: Anxiety is at manageable level  Description: Interventions:  - Assess and monitor patient's anxiety level.   - Monitor for signs and symptoms (heart palpitations, chest pain, shortness of breath, headaches, nausea, feeling jumpy, restlessness, irritable, apprehensive).   - Collaborate with interdisciplinary team and initiate plan and interventions as ordered.  - [place] patient to unit/surroundings  - Explain treatment plan  - Encourage participation in care  - Encourage verbalization of concerns/fears  - Identify coping mechanisms  - Assist in developing anxiety-reducing skills  - Administer/offer alternative therapies  - Limit or eliminate stimulants  Outcome: Progressing     Problem: DISCHARGE PLANNING - CARE MANAGEMENT  Goal: Discharge to post-acute care or home with appropriate resources  Description: INTERVENTIONS:  - Conduct assessment to determine patient/family and health care team treatment goals, and need for post-acute services based on payer coverage, community resources, and patient preferences, and barriers to discharge  - Address psychosocial, clinical, and financial barriers to discharge as identified in assessment in conjunction with the patient/family and health care team  - Arrange appropriate level of post-acute services according to patient’s   needs and preference and payer coverage in collaboration with the physician and health care team  - Communicate with and update the patient/family, physician, and health care team regarding progress on the discharge plan  - Arrange appropriate transportation to post-acute venues  Outcome: Progressing     Problem: Knowledge Deficit  Goal: Patient/family/caregiver demonstrates understanding of disease process, treatment plan, medications, and discharge  instructions  Description: Complete learning assessment and assess knowledge base.  Interventions:  - Provide teaching at level of understanding  - Provide teaching via preferred learning methods  Outcome: Progressing     Problem: SLEEP DISTURBANCE  Goal: Will exhibit normal sleeping pattern  Description: Interventions:  -  Assess the patients sleep pattern, noting recent changes  - Administer medication as ordered  - Decrease environmental stimuli, including noise, as appropriate during the night  - Encourage the patient to actively participate in unit groups and or exercise during the day to enhance ability to achieve adequate sleep at night  - Assess the patient, in the morning, encouraging a description of sleep experience  Outcome: Progressing

## 2024-08-03 PROCEDURE — 99232 SBSQ HOSP IP/OBS MODERATE 35: CPT | Performed by: PSYCHIATRY & NEUROLOGY

## 2024-08-03 RX ADMIN — ARIPIPRAZOLE 5 MG: 5 TABLET ORAL at 08:38

## 2024-08-03 RX ADMIN — CYANOCOBALAMIN TAB 1000 MCG 1000 MCG: 1000 TAB at 08:38

## 2024-08-03 RX ADMIN — FAMOTIDINE 20 MG: 20 TABLET ORAL at 17:01

## 2024-08-03 RX ADMIN — SERTRALINE HYDROCHLORIDE 150 MG: 100 TABLET ORAL at 08:38

## 2024-08-03 RX ADMIN — FAMOTIDINE 20 MG: 20 TABLET ORAL at 08:38

## 2024-08-03 RX ADMIN — ATORVASTATIN CALCIUM 10 MG: 10 TABLET, FILM COATED ORAL at 17:01

## 2024-08-03 RX ADMIN — LEVOTHYROXINE SODIUM 37.5 MCG: 125 TABLET ORAL at 05:33

## 2024-08-03 RX ADMIN — CHOLECALCIFEROL TAB 25 MCG (1000 UNIT) 2000 UNITS: 25 TAB at 08:38

## 2024-08-03 NOTE — PROGRESS NOTES
Progress Note - Behavioral Health   Franco Roberson 39 y.o. male MRN: [id number]  Unit/Bed#: U 345-02 Encounter: [id number]    Behavior over the last 24 hours:  unchanged.   Sleep: reports problems staying asleep his adult life but will be able to nap during the day and will feel refreshed  Appetite: normal  Medication side effects: No  ROS: no complaints    Mental Status Evaluation:  Appearance:  disheveled   Behavior:  cooperative   Speech:  normal pitch and normal volume   Mood:  normal   Affect:  constricted   Thought Process:  logical   Thought Content:  normal   Perceptual Disturbances: None   Risk Potential: Suicidal Ideations none and Homicidal Ideations none   Sensorium:  person, place, and situation   Cognition:  recent and remote memory grossly intact   Consciousness:  alert and awake    Attention: attention span and concentration were age appropriate   Insight:  fair   Judgment: fair   Gait/Station: In bed   Motor Activity: no abnormal movements         Progress Toward Goals: Per RN report he has not been sleeping and remains with marginal hygiene. He reports that his depression and anxiety and low and that he is no longer having issues with anger. Reports tolerating his medications well and is on the waiting list for [facility].     Wt Readings from Last 3 Encounters:   07/28/24 130 kg (286 lb)   05/14/24 128 kg (281 lb 6.4 oz)   03/17/17 118 kg (260 lb)     Temp Readings from Last 3 Encounters:   08/03/24 (!) 96.8 °F (36 °C) (Temporal)   05/14/24 98 °F (36.7 °C)   03/17/17 98.8 °F (37.1 °C) (Tympanic)     BP Readings from Last 3 Encounters:   08/03/24 126/66   05/14/24 140/93   03/17/17 137/67     Pulse Readings from Last 3 Encounters:   08/03/24 60   05/14/24 72   03/17/17 94         Assessment & Plan   Principal Problem:    MDD (major depressive disorder), recurrent severe, without psychosis (HCC)  Active Problems:    Unspecified depressive disorder (HCC)    Medical clearance for psychiatric  admission    Hyperlipidemia    Vitamin D deficiency    B12 deficiency    Hypothyroidism    Rule Out Autism spectrum disorder      Recommended Treatment: Continue with group therapy, milieu therapy and occupational therapy.      Risks, benefits and possible side effects of Medications:   Risks, benefits, and possible side effects of medications explained to patient and patient verbalizes understanding.      Medications:       all current active meds have been reviewed, continue current psychiatric medications, and current meds:   Current Facility-Administered Medications   Medication Dose Route Frequency    acetaminophen (TYLENOL) tablet 650 mg  650 mg Oral Q6H PRN    acetaminophen (TYLENOL) tablet 650 mg  650 mg Oral Q4H PRN    acetaminophen (TYLENOL) tablet 975 mg  975 mg Oral Q6H PRN    aluminum-magnesium hydroxide-simethicone (MAALOX) oral suspension 30 mL  30 mL Oral Q4H PRN    ARIPiprazole (ABILIFY) tablet 5 mg  5 mg Oral Daily    Artificial Tears ophthalmic solution 1 drop  1 drop Both Eyes Q3H PRN    atorvastatin (LIPITOR) tablet 10 mg  10 mg Oral Daily With Dinner    benztropine (COGENTIN) injection 1 mg  1 mg Intramuscular Q4H PRN Max 6/day    benztropine (COGENTIN) tablet 1 mg  1 mg Oral Q4H PRN Max 6/day    Cholecalciferol (VITAMIN D3) tablet 2,000 Units  2,000 Units Oral Daily    cyanocobalamin (VITAMIN B-12) tablet 1,000 mcg  1,000 mcg Oral Daily    Diclofenac Sodium (VOLTAREN) 1 % topical gel 2 g  2 g Topical 4x Daily PRN    hydrOXYzine HCL (ATARAX) tablet 50 mg  50 mg Oral Q6H PRN Max 4/day    Or    diphenhydrAMINE (BENADRYL) injection 50 mg  50 mg Intramuscular Q6H PRN    famotidine (PEPCID) tablet 20 mg  20 mg Oral BID    hydrOXYzine HCL (ATARAX) tablet 100 mg  100 mg Oral Q6H PRN Max 4/day    Or    LORazepam (ATIVAN) injection 2 mg  2 mg Intramuscular Q6H PRN    hydrOXYzine HCL (ATARAX) tablet 25 mg  25 mg Oral Q6H PRN Max 4/day    levothyroxine tablet 37.5 mcg  37.5 mcg Oral Early Morning     melatonin tablet 6 mg  6 mg Oral HS    methocarbamol (ROBAXIN) tablet 500 mg  500 mg Oral Q6H PRN    OLANZapine (ZyPREXA) tablet 5 mg  5 mg Oral Q4H PRN Max 3/day    Or    OLANZapine (ZyPREXA) IM injection 2.5 mg  2.5 mg Intramuscular Q4H PRN Max 3/day    OLANZapine (ZyPREXA) tablet 5 mg  5 mg Oral Q3H PRN Max 3/day    Or    OLANZapine (ZyPREXA) IM injection 5 mg  5 mg Intramuscular Q3H PRN Max 3/day    OLANZapine (ZyPREXA) tablet 2.5 mg  2.5 mg Oral Q4H PRN Max 6/day    polyethylene glycol (MIRALAX) packet 17 g  17 g Oral Daily PRN    propranolol (INDERAL) tablet 10 mg  10 mg Oral Q8H PRN    senna-docusate sodium (SENOKOT S) 8.6-50 mg per tablet 1 tablet  1 tablet Oral Daily PRN    sertraline (ZOLOFT) tablet 150 mg  150 mg Oral Daily    traZODone (DESYREL) tablet 50 mg  50 mg Oral HS PRN   .    Labs:  Reviewed  Admission on 05/14/2024   Component Date Value    Sodium 05/15/2024 142     Potassium 05/15/2024 4.1     Chloride 05/15/2024 106     CO2 05/15/2024 28     ANION GAP 05/15/2024 8     BUN 05/15/2024 13     Creatinine 05/15/2024 0.97     Glucose 05/15/2024 89     Glucose, Fasting 05/15/2024 89     Calcium 05/15/2024 9.3     AST 05/15/2024 21     ALT 05/15/2024 19     Alkaline Phosphatase 05/15/2024 66     Total Protein 05/15/2024 7.6     Albumin 05/15/2024 4.3     Total Bilirubin 05/15/2024 0.69     eGFR 05/15/2024 97     WBC 05/15/2024 5.42     RBC 05/15/2024 5.05     Hemoglobin 05/15/2024 16.1     Hematocrit 05/15/2024 49.4 (H)     MCV 05/15/2024 98     MCH 05/15/2024 31.9     MCHC 05/15/2024 32.6     RDW 05/15/2024 12.6     MPV 05/15/2024 9.2     Platelets 05/15/2024 268     nRBC 05/15/2024 0     Segmented % 05/15/2024 53     Immature Grans % 05/15/2024 0     Lymphocytes % 05/15/2024 38     Monocytes % 05/15/2024 7     Eosinophils Relative 05/15/2024 2     Basophils Relative 05/15/2024 0     Absolute Neutrophils 05/15/2024 2.83     Absolute Immature Grans 05/15/2024 0.01     Absolute Lymphocytes 05/15/2024  2.06     Absolute Monocytes 05/15/2024 0.40     Eosinophils Absolute 05/15/2024 0.10     Basophils Absolute 05/15/2024 0.02     TSH 3RD GENERATON 05/15/2024 5.162 (H)     Vitamin B-12 05/15/2024 168 (L)     Folate 05/15/2024 >22.3     Vit D, 25-Hydroxy 05/15/2024 11.6 (L)     Cholesterol 05/15/2024 191     Triglycerides 05/15/2024 116     HDL, Direct 05/15/2024 44     LDL Calculated 05/15/2024 124 (H)     Non-HDL-Chol (CHOL-HDL) 05/15/2024 147     Syphilis Total Antibody 05/15/2024 Non-reactive     Free T4 05/15/2024 0.60 (L)     Ventricular Rate 05/15/2024 66     Atrial Rate 05/15/2024 66     PA Interval 05/15/2024 146     QRSD Interval 05/15/2024 70     QT Interval 05/15/2024 402     QTC Interval 05/15/2024 421     P Axis 05/15/2024 39     QRS Axis 05/15/2024 48     T Wave Axis 05/15/2024 41     Ventricular Rate 05/15/2024 61     Atrial Rate 05/15/2024 61     PA Interval 05/15/2024 188     QRSD Interval 05/15/2024 92     QT Interval 05/15/2024 426     QTC Interval 05/15/2024 428     P Axis 05/15/2024 56     QRS Axis 05/15/2024 50     T Wave Axis 05/15/2024 46     Color, UA 06/26/2024 Yellow     Clarity, UA 06/26/2024 Slightly Cloudy (A)     Specific [place], UA 06/26/2024 1.020     pH, UA 06/26/2024 6.0     Leukocytes, UA 06/26/2024 Negative     Nitrite, UA 06/26/2024 Negative     Protein, UA 06/26/2024 15 (Trace) (A)     Glucose, UA 06/26/2024 Negative     Ketones, UA 06/26/2024 Negative     Bilirubin, UA 06/26/2024 Negative     Occult Blood, UA 06/26/2024 Negative     UROBILINOGEN UA 06/26/2024 Negative     RBC, UA 06/26/2024 None Seen     WBC, UA 06/26/2024 None Seen     Epithelial Cells 06/26/2024 None Seen     Bacteria, UA 06/26/2024 Moderate (A)     AMORPH URATES 06/26/2024 Moderate     MUCUS THREADS 06/26/2024 Occasional (A)     Sodium 06/27/2024 138     Potassium 06/27/2024 4.5     Chloride 06/27/2024 101     CO2 06/27/2024 30     ANION GAP 06/27/2024 7     BUN 06/27/2024 21     Creatinine 06/27/2024 1.02      Glucose 06/27/2024 85     Calcium 06/27/2024 9.7     AST 06/27/2024 20     ALT 06/27/2024 22     Alkaline Phosphatase 06/27/2024 68     Total Protein 06/27/2024 7.9     Albumin 06/27/2024 4.4     Total Bilirubin 06/27/2024 0.32     eGFR 06/27/2024 92     WBC 06/27/2024 7.01     RBC 06/27/2024 4.54     Hemoglobin 06/27/2024 14.5     Hematocrit 06/27/2024 44.4     MCV 06/27/2024 98     MCH 06/27/2024 31.9     MCHC 06/27/2024 32.7     RDW 06/27/2024 12.0     MPV 06/27/2024 8.9     Platelets 06/27/2024 249     nRBC 06/27/2024 0     Segmented % 06/27/2024 58     Immature Grans % 06/27/2024 0     Lymphocytes % 06/27/2024 31     Monocytes % 06/27/2024 9     Eosinophils Relative 06/27/2024 2     Basophils Relative 06/27/2024 0     Absolute Neutrophils 06/27/2024 4.01     Absolute Immature Grans 06/27/2024 0.02     Absolute Lymphocytes 06/27/2024 2.14     Absolute Monocytes 06/27/2024 0.65     Eosinophils Absolute 06/27/2024 0.17     Basophils Absolute 06/27/2024 0.02     QFT Nil 07/03/2024 0.03     QFT TB1-NIL 07/03/2024 0.02     QFT TB2-NIL 07/03/2024 0.04     QFT Mitogen-NIL 07/03/2024 9.27     QFT Final Interpretation 07/03/2024 Negative     Cholesterol 07/05/2024 175     Triglycerides 07/05/2024 99     HDL, Direct 07/05/2024 53     LDL Calculated 07/05/2024 102 (H)     Non-HDL-Chol (CHOL-HDL) 07/05/2024 122     Clarity, UA 07/09/2024 Clear     Color, UA 07/09/2024 Straw     Specific [place], UA 07/09/2024 1.015     pH, UA 07/09/2024 6.0     Glucose, UA 07/09/2024 Negative     Ketones, UA 07/09/2024 Negative     Occult Blood, UA 07/09/2024 Negative     Protein, UA 07/09/2024 Negative     Nitrite, UA 07/09/2024 Negative     Bilirubin, UA 07/09/2024 Negative     Leukocytes, UA 07/09/2024 Negative     WBC, UA 07/09/2024 None Seen     RBC, UA 07/09/2024 None Seen     Bacteria, UA 07/09/2024 None Seen     Epithelial Cells 07/09/2024 None Seen     UROBILINOGEN UA 07/09/2024 Negative     Urine Culture 07/09/2024 No Growth  <1000 cfu/mL     TSH 3RD GENERATON 07/10/2024 5.705 (H)     Sodium 07/10/2024 139     Potassium 07/10/2024 3.8     Chloride 07/10/2024 103     CO2 07/10/2024 27     ANION GAP 07/10/2024 9     BUN 07/10/2024 16     Creatinine 07/10/2024 0.92     Glucose 07/10/2024 87     Glucose, Fasting 07/10/2024 87     Calcium 07/10/2024 9.3     AST 07/10/2024 28     ALT 07/10/2024 36     Alkaline Phosphatase 07/10/2024 66     Total Protein 07/10/2024 7.7     Albumin 07/10/2024 4.4     Total Bilirubin 07/10/2024 0.55     eGFR 07/10/2024 104     Free T4 07/10/2024 0.54 (L)        Counseling / Coordination of Care  Total floor / unit time spent today 20 minutes. Greater than 50% of total time was spent with the patient and / or family counseling and / or coordination of care. A description of the counseling / coordination of care: medications, treatment progress and treatment plan reviewed with patient.

## 2024-08-03 NOTE — NURSING NOTE
Patient noted walking on the unit , appears to withdrawn and depressed. Patient keeps to self and does not participate in activities. Patient reports feeling ready to go and be on his own and not afraid of going to a group home. Patient denies SI/HI/AH/VH at this time. Compliant with medications and routine vitals.

## 2024-08-03 NOTE — NURSING NOTE
Patient appears disheveled; encouraging hygiene. Patient remains somewhat withdrawn, with poor eye contact during conversation. His verbal responses are appropriate and oriented; he is cooperative with assessment. He is adherent with medication and reports his current regimen has been effective. He reports very minimal feelings of depression and anxiety, and denies SI, HI, AVH.

## 2024-08-03 NOTE — PLAN OF CARE
Problem: Ineffective Coping  Goal: Identifies ineffective coping skills  Outcome: Progressing  Goal: Identifies healthy coping skills  Outcome: Progressing  Goal: Demonstrates healthy coping skills  Outcome: Progressing  Goal: Participates in unit activities  Description: Interventions:  - Provide therapeutic environment   - Provide required programming   - Redirect inappropriate behaviors   Outcome: Progressing     Problem: Risk for Self Injury/Neglect  Goal: Treatment Goal: Remain safe during length of stay, learn and adopt new coping skills, and be free of self-injurious ideation, impulses and acts at the time of discharge  Outcome: Progressing     Problem: Depression  Goal: Treatment Goal: Demonstrate behavioral control of depressive symptoms, verbalize feelings of improved mood/affect, and adopt new coping skills prior to discharge  Outcome: Progressing  Goal: Verbalize thoughts and feelings  Description: Interventions:  - Assess and re-assess patient's level of risk   - Engage patient in 1:1 interactions, daily, for a minimum of 15 minutes   - Encourage patient to express feelings, fears, frustrations, hopes   Outcome: Progressing  Goal: Refrain from harming self  Description: Interventions:  - Monitor patient closely, per order   - Supervise medication ingestion, monitor effects and side effects   Outcome: Progressing  Goal: Refrain from isolation  Description: Interventions:  - Develop a trusting relationship   - Encourage socialization   Outcome: Progressing  Goal: Refrain from self-neglect  Outcome: Progressing  Goal: Attend and participate in unit activities, including therapeutic, recreational, and educational groups  Description: Interventions:  - Provide therapeutic and educational activities daily, encourage attendance and participation, and document same in the medical record   Outcome: Progressing  Goal: Complete daily ADLs, including personal hygiene independently, as able  Description:  Interventions:  - Observe, teach, and assist patient with ADLS  -  Monitor and promote a balance of rest/activity, with adequate nutrition and elimination   Outcome: Progressing     Problem: Anxiety  Goal: Anxiety is at manageable level  Description: Interventions:  - Assess and monitor patient's anxiety level.   - Monitor for signs and symptoms (heart palpitations, chest pain, shortness of breath, headaches, nausea, feeling jumpy, restlessness, irritable, apprehensive).   - Collaborate with interdisciplinary team and initiate plan and interventions as ordered.  - [place] patient to unit/surroundings  - Explain treatment plan  - Encourage participation in care  - Encourage verbalization of concerns/fears  - Identify coping mechanisms  - Assist in developing anxiety-reducing skills  - Administer/offer alternative therapies  - Limit or eliminate stimulants  Outcome: Progressing     Problem: Knowledge Deficit  Goal: Patient/family/caregiver demonstrates understanding of disease process, treatment plan, medications, and discharge instructions  Description: Complete learning assessment and assess knowledge base.  Interventions:  - Provide teaching at level of understanding  - Provide teaching via preferred learning methods  Outcome: Progressing     Problem: SLEEP DISTURBANCE  Goal: Will exhibit normal sleeping pattern  Description: Interventions:  -  Assess the patients sleep pattern, noting recent changes  - Administer medication as ordered  - Decrease environmental stimuli, including noise, as appropriate during the night  - Encourage the patient to actively participate in unit groups and or exercise during the day to enhance ability to achieve adequate sleep at night  - Assess the patient, in the morning, encouraging a description of sleep experience  Outcome: Progressing

## 2024-08-04 PROCEDURE — 99232 SBSQ HOSP IP/OBS MODERATE 35: CPT | Performed by: PSYCHIATRY & NEUROLOGY

## 2024-08-04 RX ADMIN — ATORVASTATIN CALCIUM 10 MG: 10 TABLET, FILM COATED ORAL at 17:30

## 2024-08-04 RX ADMIN — FAMOTIDINE 20 MG: 20 TABLET ORAL at 08:04

## 2024-08-04 RX ADMIN — ARIPIPRAZOLE 5 MG: 5 TABLET ORAL at 08:04

## 2024-08-04 RX ADMIN — CHOLECALCIFEROL TAB 25 MCG (1000 UNIT) 2000 UNITS: 25 TAB at 08:04

## 2024-08-04 RX ADMIN — SERTRALINE HYDROCHLORIDE 150 MG: 100 TABLET ORAL at 08:04

## 2024-08-04 RX ADMIN — FAMOTIDINE 20 MG: 20 TABLET ORAL at 17:30

## 2024-08-04 RX ADMIN — CYANOCOBALAMIN TAB 1000 MCG 1000 MCG: 1000 TAB at 08:04

## 2024-08-04 RX ADMIN — LEVOTHYROXINE SODIUM 37.5 MCG: 125 TABLET ORAL at 05:20

## 2024-08-04 NOTE — NURSING NOTE
Patient has been present in the milieu interacting with peers and staff. Patient denies SI/HI/AH/VH, and offers no complaints. This evening when passing evening medications patient stated that he did not want his Melatonin 6 mg do to sleeping a majority of this evening. Patient was informed that his Melatonin was due @ 10 pm, and if he would like to take it prior to 11 pm he could still get it. Patient was also informed that if after 11 pm he was unable to sleep he could as for something to assist with sleep.

## 2024-08-04 NOTE — NURSING NOTE
Pt compliant with scheduled medication.  Pt is visible and selectively social.  Pt dressed in hospital pants and purple T-shirt he is normally seen wearing.  Pt denies HI/SI/AVH.  Pt has not reported unmet need at this time.

## 2024-08-04 NOTE — NURSING NOTE
Upon approaching patient closer to 11 pm patient stated that he did not want his scheduled Melatonin. Patient was informed that if he needed anything to approach the nursing station.

## 2024-08-04 NOTE — PLAN OF CARE
Problem: Ineffective Coping  Goal: Identifies ineffective coping skills  Outcome: Progressing  Goal: Identifies healthy coping skills  Outcome: Progressing  Goal: Demonstrates healthy coping skills  Outcome: Progressing  Goal: Participates in unit activities  Description: Interventions:  - Provide therapeutic environment   - Provide required programming   - Redirect inappropriate behaviors   Outcome: Progressing     Problem: Risk for Self Injury/Neglect  Goal: Treatment Goal: Remain safe during length of stay, learn and adopt new coping skills, and be free of self-injurious ideation, impulses and acts at the time of discharge  Outcome: Progressing     Problem: Depression  Goal: Treatment Goal: Demonstrate behavioral control of depressive symptoms, verbalize feelings of improved mood/affect, and adopt new coping skills prior to discharge  Outcome: Progressing  Goal: Verbalize thoughts and feelings  Description: Interventions:  - Assess and re-assess patient's level of risk   - Engage patient in 1:1 interactions, daily, for a minimum of 15 minutes   - Encourage patient to express feelings, fears, frustrations, hopes   Outcome: Progressing  Goal: Refrain from harming self  Description: Interventions:  - Monitor patient closely, per order   - Supervise medication ingestion, monitor effects and side effects   Outcome: Progressing  Goal: Refrain from isolation  Description: Interventions:  - Develop a trusting relationship   - Encourage socialization   Outcome: Progressing  Goal: Refrain from self-neglect  Outcome: Progressing  Goal: Attend and participate in unit activities, including therapeutic, recreational, and educational groups  Description: Interventions:  - Provide therapeutic and educational activities daily, encourage attendance and participation, and document same in the medical record   Outcome: Progressing  Goal: Complete daily ADLs, including personal hygiene independently, as able  Description:  Interventions:  - Observe, teach, and assist patient with ADLS  -  Monitor and promote a balance of rest/activity, with adequate nutrition and elimination   Outcome: Progressing     Problem: Anxiety  Goal: Anxiety is at manageable level  Description: Interventions:  - Assess and monitor patient's anxiety level.   - Monitor for signs and symptoms (heart palpitations, chest pain, shortness of breath, headaches, nausea, feeling jumpy, restlessness, irritable, apprehensive).   - Collaborate with interdisciplinary team and initiate plan and interventions as ordered.  - [place] patient to unit/surroundings  - Explain treatment plan  - Encourage participation in care  - Encourage verbalization of concerns/fears  - Identify coping mechanisms  - Assist in developing anxiety-reducing skills  - Administer/offer alternative therapies  - Limit or eliminate stimulants  Outcome: Progressing     Problem: Knowledge Deficit  Goal: Patient/family/caregiver demonstrates understanding of disease process, treatment plan, medications, and discharge instructions  Description: Complete learning assessment and assess knowledge base.  Interventions:  - Provide teaching at level of understanding  - Provide teaching via preferred learning methods  Outcome: Progressing     Problem: SLEEP DISTURBANCE  Goal: Will exhibit normal sleeping pattern  Description: Interventions:  -  Assess the patients sleep pattern, noting recent changes  - Administer medication as ordered  - Decrease environmental stimuli, including noise, as appropriate during the night  - Encourage the patient to actively participate in unit groups and or exercise during the day to enhance ability to achieve adequate sleep at night  - Assess the patient, in the morning, encouraging a description of sleep experience  Outcome: Progressing

## 2024-08-04 NOTE — PROGRESS NOTES
"Progress Note - Behavioral Health   Franco Roberson 39 y.o. male MRN: [id number]  Unit/Bed#: -02 Encounter: [id number]    Behavior over the last 24 hours:  unchanged.   Sleep: poor sleep per RN report  Appetite: normal  Medication side effects: No  ROS: no complaints    Mental Status Evaluation:  Appearance:  age appropriate and casually dressed   Behavior:  cooperative   Speech:  normal pitch and normal volume   Mood:  normal   Affect:  constricted   Thought Process:  logical   Thought Content:  normal   Perceptual Disturbances: None   Risk Potential: Suicidal Ideations none and Homicidal Ideations none   Sensorium:  person, place, and situation   Cognition:  recent and remote memory grossly intact   Consciousness:  alert and awake    Attention: attention span and concentration were age appropriate   Insight:  fair   Judgment: fair   Gait/Station: In bed   Motor Activity: no abnormal movements       Progress Toward Goals: Patient seen in AM in bed after breakfast, reports sleep has been better since he got a new roommate, did not get to any groups yesterday (\"I think I may have slept through them\"). Still with reduced anxiety, depression and anger and patiently awaiting placement.    Wt Readings from Last 3 Encounters:   08/04/24 131 kg (289 lb 12.8 oz)   05/14/24 128 kg (281 lb 6.4 oz)   03/17/17 118 kg (260 lb)     Temp Readings from Last 3 Encounters:   08/04/24 (!) 97.3 °F (36.3 °C) (Temporal)   05/14/24 98 °F (36.7 °C)   03/17/17 98.8 °F (37.1 °C) (Tympanic)     BP Readings from Last 3 Encounters:   08/04/24 118/74   05/14/24 140/93   03/17/17 137/67     Pulse Readings from Last 3 Encounters:   08/04/24 67   05/14/24 72   03/17/17 94         Assessment & Plan   Principal Problem:    MDD (major depressive disorder), recurrent severe, without psychosis (HCC)  Active Problems:    Unspecified depressive disorder (HCC)    Medical clearance for psychiatric admission    Hyperlipidemia    Vitamin D deficiency    B12 " deficiency    Hypothyroidism    Rule Out Autism spectrum disorder      Recommended Treatment: Continue with group therapy, milieu therapy and occupational therapy.      Risks, benefits and possible side effects of Medications:   Risks, benefits, and possible side effects of medications explained to patient and patient verbalizes understanding.      Medications:       all current active meds have been reviewed, continue current psychiatric medications, and current meds:   Current Facility-Administered Medications   Medication Dose Route Frequency    acetaminophen (TYLENOL) tablet 650 mg  650 mg Oral Q6H PRN    acetaminophen (TYLENOL) tablet 650 mg  650 mg Oral Q4H PRN    acetaminophen (TYLENOL) tablet 975 mg  975 mg Oral Q6H PRN    aluminum-magnesium hydroxide-simethicone (MAALOX) oral suspension 30 mL  30 mL Oral Q4H PRN    ARIPiprazole (ABILIFY) tablet 5 mg  5 mg Oral Daily    Artificial Tears ophthalmic solution 1 drop  1 drop Both Eyes Q3H PRN    atorvastatin (LIPITOR) tablet 10 mg  10 mg Oral Daily With Dinner    benztropine (COGENTIN) injection 1 mg  1 mg Intramuscular Q4H PRN Max 6/day    benztropine (COGENTIN) tablet 1 mg  1 mg Oral Q4H PRN Max 6/day    Cholecalciferol (VITAMIN D3) tablet 2,000 Units  2,000 Units Oral Daily    cyanocobalamin (VITAMIN B-12) tablet 1,000 mcg  1,000 mcg Oral Daily    Diclofenac Sodium (VOLTAREN) 1 % topical gel 2 g  2 g Topical 4x Daily PRN    hydrOXYzine HCL (ATARAX) tablet 50 mg  50 mg Oral Q6H PRN Max 4/day    Or    diphenhydrAMINE (BENADRYL) injection 50 mg  50 mg Intramuscular Q6H PRN    famotidine (PEPCID) tablet 20 mg  20 mg Oral BID    hydrOXYzine HCL (ATARAX) tablet 100 mg  100 mg Oral Q6H PRN Max 4/day    Or    LORazepam (ATIVAN) injection 2 mg  2 mg Intramuscular Q6H PRN    hydrOXYzine HCL (ATARAX) tablet 25 mg  25 mg Oral Q6H PRN Max 4/day    levothyroxine tablet 37.5 mcg  37.5 mcg Oral Early Morning    melatonin tablet 6 mg  6 mg Oral HS    methocarbamol (ROBAXIN)  tablet 500 mg  500 mg Oral Q6H PRN    OLANZapine (ZyPREXA) tablet 5 mg  5 mg Oral Q4H PRN Max 3/day    Or    OLANZapine (ZyPREXA) IM injection 2.5 mg  2.5 mg Intramuscular Q4H PRN Max 3/day    OLANZapine (ZyPREXA) tablet 5 mg  5 mg Oral Q3H PRN Max 3/day    Or    OLANZapine (ZyPREXA) IM injection 5 mg  5 mg Intramuscular Q3H PRN Max 3/day    OLANZapine (ZyPREXA) tablet 2.5 mg  2.5 mg Oral Q4H PRN Max 6/day    polyethylene glycol (MIRALAX) packet 17 g  17 g Oral Daily PRN    propranolol (INDERAL) tablet 10 mg  10 mg Oral Q8H PRN    senna-docusate sodium (SENOKOT S) 8.6-50 mg per tablet 1 tablet  1 tablet Oral Daily PRN    sertraline (ZOLOFT) tablet 150 mg  150 mg Oral Daily    traZODone (DESYREL) tablet 50 mg  50 mg Oral HS PRN   .    Labs:  Reviewed  Admission on 05/14/2024   Component Date Value    Sodium 05/15/2024 142     Potassium 05/15/2024 4.1     Chloride 05/15/2024 106     CO2 05/15/2024 28     ANION GAP 05/15/2024 8     BUN 05/15/2024 13     Creatinine 05/15/2024 0.97     Glucose 05/15/2024 89     Glucose, Fasting 05/15/2024 89     Calcium 05/15/2024 9.3     AST 05/15/2024 21     ALT 05/15/2024 19     Alkaline Phosphatase 05/15/2024 66     Total Protein 05/15/2024 7.6     Albumin 05/15/2024 4.3     Total Bilirubin 05/15/2024 0.69     eGFR 05/15/2024 97     WBC 05/15/2024 5.42     RBC 05/15/2024 5.05     Hemoglobin 05/15/2024 16.1     Hematocrit 05/15/2024 49.4 (H)     MCV 05/15/2024 98     MCH 05/15/2024 31.9     MCHC 05/15/2024 32.6     RDW 05/15/2024 12.6     MPV 05/15/2024 9.2     Platelets 05/15/2024 268     nRBC 05/15/2024 0     Segmented % 05/15/2024 53     Immature Grans % 05/15/2024 0     Lymphocytes % 05/15/2024 38     Monocytes % 05/15/2024 7     Eosinophils Relative 05/15/2024 2     Basophils Relative 05/15/2024 0     Absolute Neutrophils 05/15/2024 2.83     Absolute Immature Grans 05/15/2024 0.01     Absolute Lymphocytes 05/15/2024 2.06     Absolute Monocytes 05/15/2024 0.40     Eosinophils  Absolute 05/15/2024 0.10     Basophils Absolute 05/15/2024 0.02     TSH 3RD GENERATON 05/15/2024 5.162 (H)     Vitamin B-12 05/15/2024 168 (L)     Folate 05/15/2024 >22.3     Vit D, 25-Hydroxy 05/15/2024 11.6 (L)     Cholesterol 05/15/2024 191     Triglycerides 05/15/2024 116     HDL, Direct 05/15/2024 44     LDL Calculated 05/15/2024 124 (H)     Non-HDL-Chol (CHOL-HDL) 05/15/2024 147     Syphilis Total Antibody 05/15/2024 Non-reactive     Free T4 05/15/2024 0.60 (L)     Ventricular Rate 05/15/2024 66     Atrial Rate 05/15/2024 66     AK Interval 05/15/2024 146     QRSD Interval 05/15/2024 70     QT Interval 05/15/2024 402     QTC Interval 05/15/2024 421     P Axis 05/15/2024 39     QRS Axis 05/15/2024 48     T Wave Axis 05/15/2024 41     Ventricular Rate 05/15/2024 61     Atrial Rate 05/15/2024 61     AK Interval 05/15/2024 188     QRSD Interval 05/15/2024 92     QT Interval 05/15/2024 426     QTC Interval 05/15/2024 428     P Axis 05/15/2024 56     QRS Axis 05/15/2024 50     T Wave Axis 05/15/2024 46     Color, UA 06/26/2024 Yellow     Clarity, UA 06/26/2024 Slightly Cloudy (A)     Specific [place], UA 06/26/2024 1.020     pH, UA 06/26/2024 6.0     Leukocytes, UA 06/26/2024 Negative     Nitrite, UA 06/26/2024 Negative     Protein, UA 06/26/2024 15 (Trace) (A)     Glucose, UA 06/26/2024 Negative     Ketones, UA 06/26/2024 Negative     Bilirubin, UA 06/26/2024 Negative     Occult Blood, UA 06/26/2024 Negative     UROBILINOGEN UA 06/26/2024 Negative     RBC, UA 06/26/2024 None Seen     WBC, UA 06/26/2024 None Seen     Epithelial Cells 06/26/2024 None Seen     Bacteria, UA 06/26/2024 Moderate (A)     AMORPH URATES 06/26/2024 Moderate     MUCUS THREADS 06/26/2024 Occasional (A)     Sodium 06/27/2024 138     Potassium 06/27/2024 4.5     Chloride 06/27/2024 101     CO2 06/27/2024 30     ANION GAP 06/27/2024 7     BUN 06/27/2024 21     Creatinine 06/27/2024 1.02     Glucose 06/27/2024 85     Calcium 06/27/2024 9.7     AST  06/27/2024 20     ALT 06/27/2024 22     Alkaline Phosphatase 06/27/2024 68     Total Protein 06/27/2024 7.9     Albumin 06/27/2024 4.4     Total Bilirubin 06/27/2024 0.32     eGFR 06/27/2024 92     WBC 06/27/2024 7.01     RBC 06/27/2024 4.54     Hemoglobin 06/27/2024 14.5     Hematocrit 06/27/2024 44.4     MCV 06/27/2024 98     MCH 06/27/2024 31.9     MCHC 06/27/2024 32.7     RDW 06/27/2024 12.0     MPV 06/27/2024 8.9     Platelets 06/27/2024 249     nRBC 06/27/2024 0     Segmented % 06/27/2024 58     Immature Grans % 06/27/2024 0     Lymphocytes % 06/27/2024 31     Monocytes % 06/27/2024 9     Eosinophils Relative 06/27/2024 2     Basophils Relative 06/27/2024 0     Absolute Neutrophils 06/27/2024 4.01     Absolute Immature Grans 06/27/2024 0.02     Absolute Lymphocytes 06/27/2024 2.14     Absolute Monocytes 06/27/2024 0.65     Eosinophils Absolute 06/27/2024 0.17     Basophils Absolute 06/27/2024 0.02     QFT Nil 07/03/2024 0.03     QFT TB1-NIL 07/03/2024 0.02     QFT TB2-NIL 07/03/2024 0.04     QFT Mitogen-NIL 07/03/2024 9.27     QFT Final Interpretation 07/03/2024 Negative     Cholesterol 07/05/2024 175     Triglycerides 07/05/2024 99     HDL, Direct 07/05/2024 53     LDL Calculated 07/05/2024 102 (H)     Non-HDL-Chol (CHOL-HDL) 07/05/2024 122     Clarity, UA 07/09/2024 Clear     Color, UA 07/09/2024 Straw     Specific [place], UA 07/09/2024 1.015     pH, UA 07/09/2024 6.0     Glucose, UA 07/09/2024 Negative     Ketones, UA 07/09/2024 Negative     Occult Blood, UA 07/09/2024 Negative     Protein, UA 07/09/2024 Negative     Nitrite, UA 07/09/2024 Negative     Bilirubin, UA 07/09/2024 Negative     Leukocytes, UA 07/09/2024 Negative     WBC, UA 07/09/2024 None Seen     RBC, UA 07/09/2024 None Seen     Bacteria, UA 07/09/2024 None Seen     Epithelial Cells 07/09/2024 None Seen     UROBILINOGEN UA 07/09/2024 Negative     Urine Culture 07/09/2024 No Growth <1000 cfu/mL     TSH 3RD GENERATON 07/10/2024 5.705 (H)      Sodium 07/10/2024 139     Potassium 07/10/2024 3.8     Chloride 07/10/2024 103     CO2 07/10/2024 27     ANION GAP 07/10/2024 9     BUN 07/10/2024 16     Creatinine 07/10/2024 0.92     Glucose 07/10/2024 87     Glucose, Fasting 07/10/2024 87     Calcium 07/10/2024 9.3     AST 07/10/2024 28     ALT 07/10/2024 36     Alkaline Phosphatase 07/10/2024 66     Total Protein 07/10/2024 7.7     Albumin 07/10/2024 4.4     Total Bilirubin 07/10/2024 0.55     eGFR 07/10/2024 104     Free T4 07/10/2024 0.54 (L)        Counseling / Coordination of Care  Total floor / unit time spent today 20 minutes. Greater than 50% of total time was spent with the patient and / or family counseling and / or coordination of care. A description of the counseling / coordination of care: medications, treatment progress and treatment plan reviewed with patient.

## 2024-08-05 PROCEDURE — 99232 SBSQ HOSP IP/OBS MODERATE 35: CPT | Performed by: PSYCHIATRY & NEUROLOGY

## 2024-08-05 RX ADMIN — ARIPIPRAZOLE 5 MG: 5 TABLET ORAL at 08:12

## 2024-08-05 RX ADMIN — FAMOTIDINE 20 MG: 20 TABLET ORAL at 17:15

## 2024-08-05 RX ADMIN — ATORVASTATIN CALCIUM 10 MG: 10 TABLET, FILM COATED ORAL at 17:15

## 2024-08-05 RX ADMIN — LEVOTHYROXINE SODIUM 37.5 MCG: 125 TABLET ORAL at 06:31

## 2024-08-05 RX ADMIN — CHOLECALCIFEROL TAB 25 MCG (1000 UNIT) 2000 UNITS: 25 TAB at 08:12

## 2024-08-05 RX ADMIN — CYANOCOBALAMIN TAB 1000 MCG 1000 MCG: 1000 TAB at 08:12

## 2024-08-05 RX ADMIN — FAMOTIDINE 20 MG: 20 TABLET ORAL at 08:12

## 2024-08-05 RX ADMIN — SERTRALINE HYDROCHLORIDE 150 MG: 100 TABLET ORAL at 08:12

## 2024-08-05 NOTE — NURSING NOTE
"Pt denies SI/HI/Ah/VH. Present in dayroom and milieu but is mostly isolative to room this morning. Medication and meal compliant. Pt appears withdrawn.  Pt more visible later in the morning and was observed slowly pacing back and forth in front of the nurses station. Pt appears to be deep in thought but states that he feels \" fine\". Compliant with Lunch. No further concerns as of present. Plan of care ongoing.  "

## 2024-08-05 NOTE — PLAN OF CARE
Problem: Ineffective Coping  Goal: Identifies ineffective coping skills  Outcome: Progressing  Goal: Identifies healthy coping skills  Outcome: Progressing  Goal: Demonstrates healthy coping skills  Outcome: Progressing  Goal: Participates in unit activities  Description: Interventions:  - Provide therapeutic environment   - Provide required programming   - Redirect inappropriate behaviors   Outcome: Progressing     Problem: Risk for Self Injury/Neglect  Goal: Treatment Goal: Remain safe during length of stay, learn and adopt new coping skills, and be free of self-injurious ideation, impulses and acts at the time of discharge  Outcome: Progressing     Problem: Depression  Goal: Treatment Goal: Demonstrate behavioral control of depressive symptoms, verbalize feelings of improved mood/affect, and adopt new coping skills prior to discharge  Outcome: Progressing  Goal: Verbalize thoughts and feelings  Description: Interventions:  - Assess and re-assess patient's level of risk   - Engage patient in 1:1 interactions, daily, for a minimum of 15 minutes   - Encourage patient to express feelings, fears, frustrations, hopes   Outcome: Progressing  Goal: Refrain from harming self  Description: Interventions:  - Monitor patient closely, per order   - Supervise medication ingestion, monitor effects and side effects   Outcome: Progressing  Goal: Refrain from isolation  Description: Interventions:  - Develop a trusting relationship   - Encourage socialization   Outcome: Progressing  Goal: Refrain from self-neglect  Outcome: Progressing  Goal: Attend and participate in unit activities, including therapeutic, recreational, and educational groups  Description: Interventions:  - Provide therapeutic and educational activities daily, encourage attendance and participation, and document same in the medical record   Outcome: Progressing  Goal: Complete daily ADLs, including personal hygiene independently, as able  Description:  Interventions:  - Observe, teach, and assist patient with ADLS  -  Monitor and promote a balance of rest/activity, with adequate nutrition and elimination   Outcome: Progressing     Problem: Anxiety  Goal: Anxiety is at manageable level  Description: Interventions:  - Assess and monitor patient's anxiety level.   - Monitor for signs and symptoms (heart palpitations, chest pain, shortness of breath, headaches, nausea, feeling jumpy, restlessness, irritable, apprehensive).   - Collaborate with interdisciplinary team and initiate plan and interventions as ordered.  - [place] patient to unit/surroundings  - Explain treatment plan  - Encourage participation in care  - Encourage verbalization of concerns/fears  - Identify coping mechanisms  - Assist in developing anxiety-reducing skills  - Administer/offer alternative therapies  - Limit or eliminate stimulants  Outcome: Progressing     Problem: DISCHARGE PLANNING - CARE MANAGEMENT  Goal: Discharge to post-acute care or home with appropriate resources  Description: INTERVENTIONS:  - Conduct assessment to determine patient/family and health care team treatment goals, and need for post-acute services based on payer coverage, community resources, and patient preferences, and barriers to discharge  - Address psychosocial, clinical, and financial barriers to discharge as identified in assessment in conjunction with the patient/family and health care team  - Arrange appropriate level of post-acute services according to patient’s   needs and preference and payer coverage in collaboration with the physician and health care team  - Communicate with and update the patient/family, physician, and health care team regarding progress on the discharge plan  - Arrange appropriate transportation to post-acute venues  Outcome: Progressing     Problem: Knowledge Deficit  Goal: Patient/family/caregiver demonstrates understanding of disease process, treatment plan, medications, and discharge  instructions  Description: Complete learning assessment and assess knowledge base.  Interventions:  - Provide teaching at level of understanding  - Provide teaching via preferred learning methods  Outcome: Progressing     Problem: SLEEP DISTURBANCE  Goal: Will exhibit normal sleeping pattern  Description: Interventions:  -  Assess the patients sleep pattern, noting recent changes  - Administer medication as ordered  - Decrease environmental stimuli, including noise, as appropriate during the night  - Encourage the patient to actively participate in unit groups and or exercise during the day to enhance ability to achieve adequate sleep at night  - Assess the patient, in the morning, encouraging a description of sleep experience  Outcome: Not Progressing

## 2024-08-05 NOTE — NURSING NOTE
"Pt refused HS melatonin and said \"I slept fine without it last night.\"  Per pt he was asleep at 5am and up at 8am for breakfast but later with a nap after breakfast.  Will continue to monitor.   "

## 2024-08-05 NOTE — PROGRESS NOTES
08/05/24 0856   Team Meeting   Meeting Type Daily Rounds   Team Members Present   Team Members Present Physician;Nurse;   Physician Team Member Lucero   [facility]   Patient/Family Present   Patient Present No   Patient's Family Present No     Pt is medication and meal compliant. Pt denies SI/HI/AVH. Pt is waiting for placement.

## 2024-08-05 NOTE — PROGRESS NOTES
Progress Note - Behavioral Health     Franco Roberson 39 y.o. male MRN: [id number]   Unit/Bed#: [facility] 345-02 Encounter: [id number]    Behavior over the last 24 hours: unchanged.     Franco seen today, per staff report has been compliant with scheduled medications.  He is more visible and selectively social.  Participating in group activities on the unit.  The patient was seen today for evaluation.  He continues to do fairly well at present.  He is currently waiting for placement.  He says that he was able to sleep last night without the use of melatonin.  He is not reporting any depressed moods or loss of interest motivation.  He is not reporting any agitation or aggression.  No symptoms of psychosis like hallucination or any paranoid thoughts are reported.  He remains compliant with the medication.         Sleep: normal  Appetite: normal  Medication side effects: No side effects are reported.      Mental Status Evaluation:    Appearance:  marginal hygiene   Behavior:  normal, pleasant, cooperative   Speech:  normal rate, normal volume, normal pitch   Mood:  improved   Affect:  constricted   Thought Process:  logical, coherent   Associations: intact associations   Thought Content:  no overt delusions   Perceptual Disturbances: no auditory hallucinations   Risk Potential: Suicidal ideation - None  Homicidal ideation - None   Sensorium:  oriented to person, place, and time/date   Memory:  recent and remote memory grossly intact   Consciousness:  alert and awake   Attention: attention span and concentration are age appropriate   Insight:  good   Judgment: good   Gait/Station: normal balance   Motor Activity: no abnormal movements     Vital signs in last 24 hours:    Temp:  [97.9 °F (36.6 °C)-98.2 °F (36.8 °C)] 97.9 °F (36.6 °C)  HR:  [62-72] 62  BP: (108-130)/(69-81) 108/69    Laboratory results: I have personally reviewed all pertinent laboratory/tests results.        Assessment & Plan   Principal Problem:    MDD (major  depressive disorder), recurrent severe, without psychosis (HCC)  Active Problems:    Unspecified depressive disorder (HCC)    Medical clearance for psychiatric admission    Hyperlipidemia    Vitamin D deficiency    B12 deficiency    Hypothyroidism    Rule Out Autism spectrum disorder    Recommended Treatment:     Planned medication and treatment changes:      All current active medications have been reviewed  Encourage group therapy, milieu therapy and occupational therapy  Behavioral Health checks every 7 minutes  Current Facility-Administered Medications   Medication Dose Route Frequency Provider Last Rate    acetaminophen  650 mg Oral Q6H PRN Basilio Brown MD      acetaminophen  650 mg Oral Q4H PRN Basilio Brown MD      acetaminophen  975 mg Oral Q6H PRN Basilio Brown MD      aluminum-magnesium hydroxide-simethicone  30 mL Oral Q4H PRN Basilio Brown MD      ARIPiprazole  5 mg Oral Daily Basilio Panda MD      Artificial Tears  1 drop Both Eyes Q3H PRN Basilio Brown MD      atorvastatin  10 mg Oral Daily With Dinner WALLY Baptiste      benztropine  1 mg Intramuscular Q4H PRN Max 6/day Basilio Brown MD      benztropine  1 mg Oral Q4H PRN Max 6/day Basilio Brown MD      Cholecalciferol  2,000 Units Oral Daily WALLY Baptiste      cyanocobalamin  1,000 mcg Oral Daily WALLY Baptiste      Diclofenac Sodium  2 g Topical 4x Daily PRN WALLY Baptiste      hydrOXYzine HCL  50 mg Oral Q6H PRN Max 4/day Basilio Brown MD      Or    diphenhydrAMINE  50 mg Intramuscular Q6H PRN Basilio Brown MD      famotidine  20 mg Oral BID WALLY Baptiste      hydrOXYzine HCL  100 mg Oral Q6H PRN Max 4/day Basilio Brown MD      Or    LORazepam  2 mg Intramuscular Q6H PRN Basilio Brown MD      hydrOXYzine HCL  25 mg Oral Q6H PRN Max 4/day Basilio Brown MD      levothyroxine  37.5 mcg Oral Early  Morning WALLY Baptiste      melatonin  6 mg Oral HS WALLY Gomez      methocarbamol  500 mg Oral Q6H PRN WALLY Baptiste      OLANZapine  5 mg Oral Q4H PRN Max 3/day Basilio Brown MD      Or    OLANZapine  2.5 mg Intramuscular Q4H PRN Max 3/day Basilio Brown MD      OLANZapine  5 mg Oral Q3H PRN Max 3/day Basilio Brown MD      Or    OLANZapine  5 mg Intramuscular Q3H PRN Max 3/day Basilio Brown MD      OLANZapine  2.5 mg Oral Q4H PRN Max 6/day Basilio Brown MD      polyethylene glycol  17 g Oral Daily PRN Basilio Brown MD      propranolol  10 mg Oral Q8H PRN Basilio Brown MD      senna-docusate sodium  1 tablet Oral Daily PRN Basilio Brown MD      sertraline  150 mg Oral Daily WALLY Gomez      traZODone  50 mg Oral HS PRN Basilio Brown MD         Risks / Benefits of Treatment:    Risks, benefits, and possible side effects of medications explained to patient and patient verbalizes understanding and agreement for treatment.    Counseling / Coordination of Care:    Patient's progress discussed with staff in treatment team meeting.  Medications, treatment progress and treatment plan reviewed with patient.  Encouraged continued participation in group activities.  Reviewed his sleep pattern and he reports improvement in that area.  He is continued with current medication.  Currently waiting for placement.    Raj Guerrero MD 08/05/24

## 2024-08-06 PROCEDURE — 99232 SBSQ HOSP IP/OBS MODERATE 35: CPT | Performed by: PSYCHIATRY & NEUROLOGY

## 2024-08-06 RX ADMIN — ARIPIPRAZOLE 5 MG: 5 TABLET ORAL at 08:32

## 2024-08-06 RX ADMIN — CYANOCOBALAMIN TAB 1000 MCG 1000 MCG: 1000 TAB at 08:32

## 2024-08-06 RX ADMIN — FAMOTIDINE 20 MG: 20 TABLET ORAL at 17:10

## 2024-08-06 RX ADMIN — FAMOTIDINE 20 MG: 20 TABLET ORAL at 08:32

## 2024-08-06 RX ADMIN — CHOLECALCIFEROL TAB 25 MCG (1000 UNIT) 2000 UNITS: 25 TAB at 08:32

## 2024-08-06 RX ADMIN — LEVOTHYROXINE SODIUM 37.5 MCG: 125 TABLET ORAL at 05:58

## 2024-08-06 RX ADMIN — ATORVASTATIN CALCIUM 10 MG: 10 TABLET, FILM COATED ORAL at 17:10

## 2024-08-06 RX ADMIN — SERTRALINE HYDROCHLORIDE 150 MG: 100 TABLET ORAL at 08:32

## 2024-08-06 NOTE — PLAN OF CARE
Problem: Ineffective Coping  Goal: Identifies ineffective coping skills  Outcome: Progressing  Goal: Identifies healthy coping skills  Outcome: Progressing  Goal: Demonstrates healthy coping skills  Outcome: Progressing  Goal: Participates in unit activities  Description: Interventions:  - Provide therapeutic environment   - Provide required programming   - Redirect inappropriate behaviors   Outcome: Progressing     Problem: Risk for Self Injury/Neglect  Goal: Treatment Goal: Remain safe during length of stay, learn and adopt new coping skills, and be free of self-injurious ideation, impulses and acts at the time of discharge  Outcome: Progressing     Problem: Depression  Goal: Treatment Goal: Demonstrate behavioral control of depressive symptoms, verbalize feelings of improved mood/affect, and adopt new coping skills prior to discharge  Outcome: Progressing  Goal: Verbalize thoughts and feelings  Description: Interventions:  - Assess and re-assess patient's level of risk   - Engage patient in 1:1 interactions, daily, for a minimum of 15 minutes   - Encourage patient to express feelings, fears, frustrations, hopes   Outcome: Progressing  Goal: Refrain from harming self  Description: Interventions:  - Monitor patient closely, per order   - Supervise medication ingestion, monitor effects and side effects   Outcome: Progressing  Goal: Refrain from isolation  Description: Interventions:  - Develop a trusting relationship   - Encourage socialization   Outcome: Progressing  Goal: Refrain from self-neglect  Outcome: Progressing  Goal: Attend and participate in unit activities, including therapeutic, recreational, and educational groups  Description: Interventions:  - Provide therapeutic and educational activities daily, encourage attendance and participation, and document same in the medical record   Outcome: Progressing  Goal: Complete daily ADLs, including personal hygiene independently, as able  Description:  Interventions:  - Observe, teach, and assist patient with ADLS  -  Monitor and promote a balance of rest/activity, with adequate nutrition and elimination   Outcome: Progressing     Problem: Anxiety  Goal: Anxiety is at manageable level  Description: Interventions:  - Assess and monitor patient's anxiety level.   - Monitor for signs and symptoms (heart palpitations, chest pain, shortness of breath, headaches, nausea, feeling jumpy, restlessness, irritable, apprehensive).   - Collaborate with interdisciplinary team and initiate plan and interventions as ordered.  - [place] patient to unit/surroundings  - Explain treatment plan  - Encourage participation in care  - Encourage verbalization of concerns/fears  - Identify coping mechanisms  - Assist in developing anxiety-reducing skills  - Administer/offer alternative therapies  - Limit or eliminate stimulants  Outcome: Progressing     Problem: DISCHARGE PLANNING - CARE MANAGEMENT  Goal: Discharge to post-acute care or home with appropriate resources  Description: INTERVENTIONS:  - Conduct assessment to determine patient/family and health care team treatment goals, and need for post-acute services based on payer coverage, community resources, and patient preferences, and barriers to discharge  - Address psychosocial, clinical, and financial barriers to discharge as identified in assessment in conjunction with the patient/family and health care team  - Arrange appropriate level of post-acute services according to patient’s   needs and preference and payer coverage in collaboration with the physician and health care team  - Communicate with and update the patient/family, physician, and health care team regarding progress on the discharge plan  - Arrange appropriate transportation to post-acute venues  Outcome: Progressing     Problem: Knowledge Deficit  Goal: Patient/family/caregiver demonstrates understanding of disease process, treatment plan, medications, and discharge  instructions  Description: Complete learning assessment and assess knowledge base.  Interventions:  - Provide teaching at level of understanding  - Provide teaching via preferred learning methods  Outcome: Progressing     Problem: SLEEP DISTURBANCE  Goal: Will exhibit normal sleeping pattern  Description: Interventions:  -  Assess the patients sleep pattern, noting recent changes  - Administer medication as ordered  - Decrease environmental stimuli, including noise, as appropriate during the night  - Encourage the patient to actively participate in unit groups and or exercise during the day to enhance ability to achieve adequate sleep at night  - Assess the patient, in the morning, encouraging a description of sleep experience  Outcome: Progressing

## 2024-08-06 NOTE — NURSING NOTE
Pt observed in milieu and dayroom. He brightens upon approach. He is pleasant with staff and peers. Medication and meal compliant. He denies SI/HI/AH/VH, but appears depressed. No PRN's requested. Continuous rounding maintained. No unmet needs.

## 2024-08-06 NOTE — NURSING NOTE
Patient isolative to room. Patient has a sad affect. Patient brightens up on approach and eye contact is fair. Patient denies all psych symptoms at this time. Patient refused scheduled melatonin. Q7 observation maintained.

## 2024-08-06 NOTE — PROGRESS NOTES
Progress Note - Behavioral Health     Franco Roberson 39 y.o. male MRN: [id number]   Unit/Bed#: U 345-02 Encounter: [id number]    Behavior over the last 24 hours: improved.     Franco seen today, per staff report has been visible on the unit.  He is also participating in the groups.  He is waiting for his placement.  The patient continues to do well and is not reporting any current symptoms.  He says that his sleep has been better and he has not been using melatonin anymore.  Denies any thoughts of self-harm or harm to others.  He is not reporting any voices or having any paranoid thoughts.  He says that his moods have been good.         Sleep: normal  Appetite: normal  Medication side effects: None reported      Mental Status Evaluation:    Appearance:  age appropriate   Behavior:  pleasant, cooperative   Speech:  normal rate, normal volume, normal pitch   Mood:  euthymic   Affect:  constricted   Thought Process:  logical   Associations: intact associations   Thought Content:  no overt delusions   Perceptual Disturbances: no auditory hallucinations   Risk Potential: Suicidal ideation - None at present  Homicidal ideation - None at present   Sensorium:  oriented to person, place, and time/date   Memory:  recent and remote memory grossly intact   Consciousness:  alert and awake   Attention: attention span and concentration are age appropriate   Insight:  good   Judgment: good   Gait/Station: normal gait/station   Motor Activity: no abnormal movements     Vital signs in last 24 hours:    Temp:  [97.3 °F (36.3 °C)] 97.3 °F (36.3 °C)  HR:  [68-97] 68  Resp:  [16] 16  BP: (111-124)/(70-71) 124/70    Laboratory results: I have personally reviewed all pertinent laboratory/tests results.  Most Recent Labs:   Lab Results   Component Value Date    WBC 7.01 06/27/2024    RBC 4.54 06/27/2024    HGB 14.5 06/27/2024    HCT 44.4 06/27/2024     06/27/2024    RDW 12.0 06/27/2024    NEUTROABS 4.01 06/27/2024    SODIUM 139  07/10/2024    K 3.8 07/10/2024     07/10/2024    CO2 27 07/10/2024    BUN 16 07/10/2024    CREATININE 0.92 07/10/2024    GLUC 87 07/10/2024    GLUF 87 07/10/2024    CALCIUM 9.3 07/10/2024    AST 28 07/10/2024    ALT 36 07/10/2024    ALKPHOS 66 07/10/2024    TP 7.7 07/10/2024    ALB 4.4 07/10/2024    TBILI 0.55 07/10/2024    CHOLESTEROL 175 07/05/2024    HDL 53 07/05/2024    TRIG 99 07/05/2024    LDLCALC 102 (H) 07/05/2024    NONHDLC 122 07/05/2024    [id number] 5.705 (H) 07/10/2024    FREET4 0.54 (L) 07/10/2024           Assessment & Plan   Principal Problem:    MDD (major depressive disorder), recurrent severe, without psychosis (HCC)  Active Problems:    Unspecified depressive disorder (HCC)    Medical clearance for psychiatric admission    Hyperlipidemia    Vitamin D deficiency    B12 deficiency    Hypothyroidism    Rule Out Autism spectrum disorder    Recommended Treatment:     Planned medication and treatment changes:      All current active medications have been reviewed  Encourage group therapy, milieu therapy and occupational therapy  Behavioral Health checks every 7 minutes  Current Facility-Administered Medications   Medication Dose Route Frequency Provider Last Rate    acetaminophen  650 mg Oral Q6H PRN Basilio Brown MD      acetaminophen  650 mg Oral Q4H PRN Basilio Brown MD      acetaminophen  975 mg Oral Q6H PRN Basilio Brown MD      aluminum-magnesium hydroxide-simethicone  30 mL Oral Q4H PRN Basilio Brown MD      ARIPiprazole  5 mg Oral Daily Basilio Panda MD      Artificial Tears  1 drop Both Eyes Q3H PRN Basilio Brown MD      atorvastatin  10 mg Oral Daily With Dinner WALLY Baptiste      benztropine  1 mg Intramuscular Q4H PRN Max 6/day Basilio Brown MD      benztropine  1 mg Oral Q4H PRN Max 6/day Basilio Brown MD      Cholecalciferol  2,000 Units Oral Daily WALLY Baptiste      cyanocobalamin  1,000 mcg Oral  Daily WALLY Baptiste      Diclofenac Sodium  2 g Topical 4x Daily PRN WALLY Baptiste      hydrOXYzine HCL  50 mg Oral Q6H PRN Max 4/day Basilio Brown MD      Or    diphenhydrAMINE  50 mg Intramuscular Q6H PRN Basilio Brown MD      famotidine  20 mg Oral BID WALLY Baptiste      hydrOXYzine HCL  100 mg Oral Q6H PRN Max 4/day Basilio Brown MD      Or    LORazepam  2 mg Intramuscular Q6H PRN Basilio Brown MD      hydrOXYzine HCL  25 mg Oral Q6H PRN Max 4/day Basilio Brown MD      levothyroxine  37.5 mcg Oral Early Morning WALLY Baptiste      melatonin  6 mg Oral HS WALLY Gomez      methocarbamol  500 mg Oral Q6H PRN WALLY Baptiste      OLANZapine  5 mg Oral Q4H PRN Max 3/day Basilio Brown MD      Or    OLANZapine  2.5 mg Intramuscular Q4H PRN Max 3/day Basilio Brown MD      OLANZapine  5 mg Oral Q3H PRN Max 3/day Basilio Brown MD      Or    OLANZapine  5 mg Intramuscular Q3H PRN Max 3/day Basilio Brown MD      OLANZapine  2.5 mg Oral Q4H PRN Max 6/day Basilio Brown MD      polyethylene glycol  17 g Oral Daily PRN Basilio Brown MD      propranolol  10 mg Oral Q8H PRN Basilio Brown MD      senna-docusate sodium  1 tablet Oral Daily PRN Basilio Brown MD      sertraline  150 mg Oral Daily WALLY Gomez      traZODone  50 mg Oral HS PRN Basilio Brown MD         Risks / Benefits of Treatment:    Risks, benefits, and possible side effects of medications explained to patient and patient verbalizes understanding and agreement for treatment.    Counseling / Coordination of Care:    Patient's progress discussed with staff in treatment team meeting.  Medications, treatment progress and treatment plan reviewed with patient.    Raj Guerrero MD 08/06/24

## 2024-08-07 PROCEDURE — 99232 SBSQ HOSP IP/OBS MODERATE 35: CPT | Performed by: PSYCHIATRY & NEUROLOGY

## 2024-08-07 RX ORDER — BUSPIRONE HYDROCHLORIDE 10 MG/1
10 TABLET ORAL 2 TIMES DAILY
Status: DISCONTINUED | OUTPATIENT
Start: 2024-08-07 | End: 2024-08-07

## 2024-08-07 RX ADMIN — FAMOTIDINE 20 MG: 20 TABLET ORAL at 17:13

## 2024-08-07 RX ADMIN — LEVOTHYROXINE SODIUM 37.5 MCG: 125 TABLET ORAL at 06:09

## 2024-08-07 RX ADMIN — CHOLECALCIFEROL TAB 25 MCG (1000 UNIT) 2000 UNITS: 25 TAB at 08:07

## 2024-08-07 RX ADMIN — CYANOCOBALAMIN TAB 1000 MCG 1000 MCG: 1000 TAB at 08:07

## 2024-08-07 RX ADMIN — ARIPIPRAZOLE 5 MG: 5 TABLET ORAL at 08:07

## 2024-08-07 RX ADMIN — ATORVASTATIN CALCIUM 10 MG: 10 TABLET, FILM COATED ORAL at 17:13

## 2024-08-07 RX ADMIN — FAMOTIDINE 20 MG: 20 TABLET ORAL at 08:07

## 2024-08-07 RX ADMIN — SERTRALINE HYDROCHLORIDE 150 MG: 100 TABLET ORAL at 08:07

## 2024-08-07 NOTE — NURSING NOTE
Pt is visible on the unit and social with select peers. Improved eye contact. Able to make his needs known. Reports he is sleeping better since changing his room. Med and meal compliant. Denies SI/HI/AH/VH at this time.

## 2024-08-07 NOTE — PLAN OF CARE
Pt does attend groups fairly regularly and participates when in groups. Pt does appear to be gaining insight and is motivated for change.

## 2024-08-07 NOTE — PROGRESS NOTES
Progress Note - Behavioral Health     Franco Roberson 39 y.o. male MRN: [id number]   Unit/Bed#: [facility] 345-02 Encounter: [id number]    Behavior over the last 24 hours: improved.     Franco seen today, per staff report has been attending groups gaining more insight and motivated on the unit.  I evaluated the patient.  The patient reports that he he is starting to feel better and offers no complaints at this time.  He says that he continues to do well and is waiting for the placement.  The patient also reported that he has been able to sleep without melatonin.  He is not reporting any depressed moods or any voices.  He is not reporting any paranoid thoughts.         Sleep: Is much improved and has not been using any melatonin.  Appetite: normal  Medication side effects: None reported at present.      Mental Status Evaluation:    Appearance:  age appropriate   Behavior:  pleasant, cooperative   Speech:  normal rate, normal volume, normal pitch   Mood:  Described as good   Affect:  appropriate   Thought Process:  organized, logical, coherent   Associations: intact associations   Thought Content:  no overt delusions   Perceptual Disturbances: no auditory hallucinations, no visual hallucinations   Risk Potential: Suicidal ideation - None at present  Homicidal ideation - None at present   Sensorium:  oriented to person, place, and time/date   Memory:  recent and remote memory grossly intact   Consciousness:  alert and awake   Attention: attention span and concentration are age appropriate   Insight:  improved   Judgment: improved   Gait/Station: normal gait/station   Motor Activity: no abnormal movements     Vital signs in last 24 hours:    Temp:  [97.3 °F (36.3 °C)-97.6 °F (36.4 °C)] 97.6 °F (36.4 °C)  HR:  [59-68] 59  Resp:  [16] 16  BP: (124-139)/(68-70) 139/68    Laboratory results: I have personally reviewed all pertinent laboratory/tests results.  Most Recent Labs:   Lab Results   Component Value Date    WBC 7.01 06/27/2024  "   RBC 4.54 06/27/2024    HGB 14.5 06/27/2024    HCT 44.4 06/27/2024     06/27/2024    RDW 12.0 06/27/2024    NEUTROABS 4.01 06/27/2024    SODIUM 139 07/10/2024    K 3.8 07/10/2024     07/10/2024    CO2 27 07/10/2024    BUN 16 07/10/2024    CREATININE 0.92 07/10/2024    GLUC 87 07/10/2024    GLUF 87 07/10/2024    CALCIUM 9.3 07/10/2024    AST 28 07/10/2024    ALT 36 07/10/2024    ALKPHOS 66 07/10/2024    TP 7.7 07/10/2024    ALB 4.4 07/10/2024    TBILI 0.55 07/10/2024    CHOLESTEROL 175 07/05/2024    HDL 53 07/05/2024    TRIG 99 07/05/2024    LDLCALC 102 (H) 07/05/2024    NONHDLC 122 07/05/2024    [id number] 5.705 (H) 07/10/2024    FREET4 0.54 (L) 07/10/2024     Pregnancy: No results found for: \"PREGUR\", \"PREGSERUM\", \"HCG\", \"HCGQUANT\"        Assessment & Plan   Principal Problem:    MDD (major depressive disorder), recurrent severe, without psychosis (HCC)  Active Problems:    Unspecified depressive disorder (HCC)    Medical clearance for psychiatric admission    Hyperlipidemia    Vitamin D deficiency    B12 deficiency    Hypothyroidism    Rule Out Autism spectrum disorder    Recommended Treatment:     Planned medication and treatment changes: The patient is continued on current medication.  No changes are recommended at this time.  He will continue with group therapy and participate in discharge planning.        Current Facility-Administered Medications   Medication Dose Route Frequency Provider Last Rate    acetaminophen  650 mg Oral Q6H PRN Basilio Brown MD      acetaminophen  650 mg Oral Q4H PRN Basilio Brown MD      acetaminophen  975 mg Oral Q6H PRN Basilio Brown MD      aluminum-magnesium hydroxide-simethicone  30 mL Oral Q4H PRN Basilio Brown MD      ARIPiprazole  5 mg Oral Daily Basilio Panda MD      Artificial Tears  1 drop Both Eyes Q3H PRN Basilio Brown MD      atorvastatin  10 mg Oral Daily With Dinner WALLY Baptiste      benztropine  1 " mg Intramuscular Q4H PRN Max 6/day Basilio Brown MD      benztropine  1 mg Oral Q4H PRN Max 6/day Basilio Brown MD      busPIRone  10 mg Oral BID Raj Guerrero MD      Cholecalciferol  2,000 Units Oral Daily Laura Clark [place], WALLY      cyanocobalamin  1,000 mcg Oral Daily Laura Clark WALLY [place]      Diclofenac Sodium  2 g Topical 4x Daily PRN WALLY Baptiste      hydrOXYzine HCL  50 mg Oral Q6H PRN Max 4/day Basilio Brown MD      Or    diphenhydrAMINE  50 mg Intramuscular Q6H PRN Basilio Brown MD      famotidine  20 mg Oral BID Laura WALLY Olmos      hydrOXYzine HCL  100 mg Oral Q6H PRN Max 4/day Basilio Brown MD      Or    LORazepam  2 mg Intramuscular Q6H PRN Basilio Brown MD      hydrOXYzine HCL  25 mg Oral Q6H PRN Max 4/day Basilio Brown MD      levothyroxine  37.5 mcg Oral Early Morning WALLY Baptiste      melatonin  6 mg Oral HS WALLY Gomez      methocarbamol  500 mg Oral Q6H PRN WALLY Baptiste      OLANZapine  5 mg Oral Q4H PRN Max 3/day Basilio Brown MD      Or    OLANZapine  2.5 mg Intramuscular Q4H PRN Max 3/day Basilio Brown MD      OLANZapine  5 mg Oral Q3H PRN Max 3/day Basliio Brown MD      Or    OLANZapine  5 mg Intramuscular Q3H PRN Max 3/day Basilio Brown MD      OLANZapine  2.5 mg Oral Q4H PRN Max 6/day Basilio Brown MD      polyethylene glycol  17 g Oral Daily PRN Basilio Brown MD      propranolol  10 mg Oral Q8H PRN Basilio Brown MD      senna-docusate sodium  1 tablet Oral Daily PRN Basilio Brown MD      sertraline  150 mg Oral Daily WALLY Gomez      traZODone  50 mg Oral HS PRN Basilio Brown MD         Risks / Benefits of Treatment:    Risks, benefits, and possible side effects of medications explained to patient and patient verbalizes understanding and agreement for treatment.    Counseling / Coordination  of Care:    Patient's progress discussed with staff in treatment team meeting.  Medications, treatment progress and treatment plan reviewed with patient.Continue to discuss coping skills.    Raj Guerrero MD 08/07/24

## 2024-08-07 NOTE — PROGRESS NOTES
08/07/24 0859   Team Meeting   Meeting Type Daily Rounds   Team Members Present   Team Members Present Physician;Nurse;   Physician Team Member Lucero   Nursing Team Member [facility] Management Team Member Noa   Patient/Family Present   Patient Present No   Patient's Family Present No     Pt is medication and meal compliant. Pt refused melatonin. Pt's discharge is pending.

## 2024-08-07 NOTE — NURSING NOTE
PT visible, cooperative and calm on approach, observed on phone with his friend for a while.  PT denies SI/HI/VH/AH, depression and anxiety denies. PT refused scheduled Melatonin for tonight, encouraged to return if needed. No unmet needs at this time.

## 2024-08-07 NOTE — PLAN OF CARE
Problem: Ineffective Coping  Goal: Identifies ineffective coping skills  Outcome: Progressing  Goal: Identifies healthy coping skills  Outcome: Progressing  Goal: Demonstrates healthy coping skills  Outcome: Progressing  Goal: Participates in unit activities  Description: Interventions:  - Provide therapeutic environment   - Provide required programming   - Redirect inappropriate behaviors   Outcome: Progressing     Problem: Depression  Goal: Treatment Goal: Demonstrate behavioral control of depressive symptoms, verbalize feelings of improved mood/affect, and adopt new coping skills prior to discharge  Outcome: Progressing  Goal: Verbalize thoughts and feelings  Description: Interventions:  - Assess and re-assess patient's level of risk   - Engage patient in 1:1 interactions, daily, for a minimum of 15 minutes   - Encourage patient to express feelings, fears, frustrations, hopes   Outcome: Progressing  Goal: Refrain from harming self  Description: Interventions:  - Monitor patient closely, per order   - Supervise medication ingestion, monitor effects and side effects   Outcome: Progressing  Goal: Refrain from isolation  Description: Interventions:  - Develop a trusting relationship   - Encourage socialization   Outcome: Progressing  Goal: Refrain from self-neglect  Outcome: Progressing  Goal: Attend and participate in unit activities, including therapeutic, recreational, and educational groups  Description: Interventions:  - Provide therapeutic and educational activities daily, encourage attendance and participation, and document same in the medical record   Outcome: Progressing  Goal: Complete daily ADLs, including personal hygiene independently, as able  Description: Interventions:  - Observe, teach, and assist patient with ADLS  -  Monitor and promote a balance of rest/activity, with adequate nutrition and elimination   Outcome: Progressing     Problem: Anxiety  Goal: Anxiety is at manageable  level  Description: Interventions:  - Assess and monitor patient's anxiety level.   - Monitor for signs and symptoms (heart palpitations, chest pain, shortness of breath, headaches, nausea, feeling jumpy, restlessness, irritable, apprehensive).   - Collaborate with interdisciplinary team and initiate plan and interventions as ordered.  - [place] patient to unit/surroundings  - Explain treatment plan  - Encourage participation in care  - Encourage verbalization of concerns/fears  - Identify coping mechanisms  - Assist in developing anxiety-reducing skills  - Administer/offer alternative therapies  - Limit or eliminate stimulants  Outcome: Progressing

## 2024-08-08 PROCEDURE — 99232 SBSQ HOSP IP/OBS MODERATE 35: CPT

## 2024-08-08 RX ADMIN — LEVOTHYROXINE SODIUM 37.5 MCG: 125 TABLET ORAL at 06:09

## 2024-08-08 RX ADMIN — SERTRALINE HYDROCHLORIDE 150 MG: 100 TABLET ORAL at 08:06

## 2024-08-08 RX ADMIN — FAMOTIDINE 20 MG: 20 TABLET ORAL at 08:06

## 2024-08-08 RX ADMIN — ATORVASTATIN CALCIUM 10 MG: 10 TABLET, FILM COATED ORAL at 17:11

## 2024-08-08 RX ADMIN — CHOLECALCIFEROL TAB 25 MCG (1000 UNIT) 2000 UNITS: 25 TAB at 08:06

## 2024-08-08 RX ADMIN — ARIPIPRAZOLE 5 MG: 5 TABLET ORAL at 08:06

## 2024-08-08 RX ADMIN — CYANOCOBALAMIN TAB 1000 MCG 1000 MCG: 1000 TAB at 08:06

## 2024-08-08 RX ADMIN — FAMOTIDINE 20 MG: 20 TABLET ORAL at 17:11

## 2024-08-08 NOTE — PROGRESS NOTES
"Progress Note - Behavioral Health   Franco Roberson 39 y.o. male MRN: [id number]  Unit/Bed#: [facility] 345-02 Encounter: [id number]      Assessment & Plan   Principal Problem:    MDD (major depressive disorder), recurrent severe, without psychosis (HCC)  Active Problems:    Unspecified depressive disorder (HCC)    Medical clearance for psychiatric admission    Hyperlipidemia    Vitamin D deficiency    B12 deficiency    Hypothyroidism    Rule Out Autism spectrum disorder      Subjective:  Patient was seen today for continuation of care, records reviewed and patient was discussed with the morning case review team.  Per staff, Franco refused melatonin last night.  He has been meal and medication compliant otherwise.  No acute behavioral issues.      Franco was seen today for psychiatric follow-up.  On assessment today, Franco was seen resting in bed.  Franco was calm and cooperative throughout the interview.  He is awaiting placement through [facility] [place]  He reports that his mood is \"good\" today.  He states he was up late last night but does not feel that was because he did not take melatonin.  He feels he sleeps better without the melatonin.  He has no other complaints today.  He feels he has adequate energy during the day and is attending groups.     Franco denies acute suicidal/self-harm ideation/intent/plan upon direct inquiry at this time.  Franco remains behaviorally appropriate, no agitation or aggression noted on exam or in report.  Franco also denies HI/AH/VH, and does not appear overtly manic.  No overt delusions or paranoia are verbalized. Franco remains adherent to his current psychotropic medication regimen and denies any side effects from medications, as well as none noted on exam.    Recommended Treatment: Treatment plan and medication changes discussed and per the attending physician the plan is:    1.Continue with group therapy, milieu therapy and occupational therapy  2.Behavioral Health checks " every 7 minutes  3.Continue frequent safety checks and vitals per unit protocol  4.Continue with SLIM medical management as indicated  5.Continue with current medication regimen: Continue Zoloft 150mg PO daily and Abilify 5mg PO daily.  Make melatonin as needed  6.Will review labs in the a.m.  7.Disposition Planning: Discharge planning and efforts remain ongoing    Vitals:  Vitals:    08/08/24 0721   BP: 118/65   Pulse: 65   Resp: 16   Temp: (!) 97.2 °F (36.2 °C)   SpO2: 96%       Laboratory Results:  I have personally reviewed all pertinent laboratory/tests results.  Most Recent Labs:   Lab Results   Component Value Date    WBC 7.01 06/27/2024    RBC 4.54 06/27/2024    HGB 14.5 06/27/2024    HCT 44.4 06/27/2024     06/27/2024    RDW 12.0 06/27/2024    NEUTROABS 4.01 06/27/2024    SODIUM 139 07/10/2024    K 3.8 07/10/2024     07/10/2024    CO2 27 07/10/2024    BUN 16 07/10/2024    CREATININE 0.92 07/10/2024    GLUC 87 07/10/2024    GLUF 87 07/10/2024    CALCIUM 9.3 07/10/2024    AST 28 07/10/2024    ALT 36 07/10/2024    ALKPHOS 66 07/10/2024    TP 7.7 07/10/2024    ALB 4.4 07/10/2024    TBILI 0.55 07/10/2024    CHOLESTEROL 175 07/05/2024    HDL 53 07/05/2024    TRIG 99 07/05/2024    LDLCALC 102 (H) 07/05/2024    NONHDLC 122 07/05/2024    [id number] 5.705 (H) 07/10/2024    FREET4 0.54 (L) 07/10/2024       Psychiatric Review of Systems:  Behavior over the last 24 hours:  unchanged.   Sleep: adequate  Appetite: adequate  Medication side effects: denies  ROS: no complaints, denies shortness of breath or chest pain and all other systems are negative for acute changes    Mental Status Evaluation:    Appearance:  casually dressed, marginal hygiene   Behavior:  pleasant, cooperative, calm   Speech:  slow, soft   Mood:  euthymic   Affect:  constricted   Thought Process:  goal directed, linear   Associations: concrete associations   Thought Content:  no overt delusions   Perceptual Disturbances: denies auditory  or visual hallucinations when asked, does not appear responding to internal stimuli   Risk Potential: Suicidal ideation - None  Homicidal ideation - None  Potential for aggression - No   Sensorium:  oriented to person, place, and situation   Memory:  recent memory intact   Consciousness:  alert and awake   Attention/Concentration: attention span and concentration appear shorter than expected for age   Insight:  limited   Judgment: limited   Gait/Station: normal gait/station   Motor Activity: no abnormal movements     Progress Toward Goals:   Franco is progressing towards goals of inpatient psychiatric treatment by continued medication compliance and is attending therapeutic modalities on the milieu. However, the patient continues to require inpatient psychiatric hospitalization for continued medication management and titration to optimize symptom reduction, improve sleep hygiene, and demonstrate adequate self-care.    Risk of Harm to Self:   Nursing Suicide Risk Assessment Last 24 hours: C-SSRS Risk (Since Last Contact)  Calculated C-SSRS Risk Score (Since Last Contact): No Risk Indicated  Current Specific Risk Factors include: diagnosis of mood disorder  Protective Factors: no current suicidal ideation, ability to communicate with staff on the unit, able to contract for safety on the unit, improved depressive symptoms  Based on today's assessment, Franco presents the following risk of harm to self: low    Risk of Harm to Others:  Nursing Homicide Risk Assessment: Violence Risk to Others: Denies within past 6 months  Current Specific Risk Factors include: social difficulties  Protective Factors: no current homicidal ideation, compliant with medications on the unit as ordered, compliant with unit milieu, follows staff redirection  Based on today's assessment, Franco presents the following risk of harm to others: low    The following interventions are recommended: behavioral checks every 7 minutes, continued  hospitalization on locked unit        Behavioral Health Medications: all current active meds have been reviewed and continue current psychiatric medications.  Current Facility-Administered Medications   Medication Dose Route Frequency Provider Last Rate    acetaminophen  650 mg Oral Q6H PRN Basilio Brown MD      acetaminophen  650 mg Oral Q4H PRN Basilio Brown MD      acetaminophen  975 mg Oral Q6H PRN Basilio Brown MD      aluminum-magnesium hydroxide-simethicone  30 mL Oral Q4H PRN Basilio Brown MD      ARIPiprazole  5 mg Oral Daily Basilio Panda MD      Artificial Tears  1 drop Both Eyes Q3H PRN Basilio Brown MD      atorvastatin  10 mg Oral Daily With Dinner WALLY Baptiste      benztropine  1 mg Intramuscular Q4H PRN Max 6/day Basilio Brown MD      benztropine  1 mg Oral Q4H PRN Max 6/day Basilio Brown MD      Cholecalciferol  2,000 Units Oral Daily WALLY Baptiste      cyanocobalamin  1,000 mcg Oral Daily WALLY Baptiste      Diclofenac Sodium  2 g Topical 4x Daily PRN WALLY Baptiste      hydrOXYzine HCL  50 mg Oral Q6H PRN Max 4/day Basilio Brown MD      Or    diphenhydrAMINE  50 mg Intramuscular Q6H PRN Basilio Brown MD      famotidine  20 mg Oral BID WALLY Baptiste      hydrOXYzine HCL  100 mg Oral Q6H PRN Max 4/day Basilio Brown MD      Or    LORazepam  2 mg Intramuscular Q6H PRN Basilio Brown MD      hydrOXYzine HCL  25 mg Oral Q6H PRN Max 4/day Basilio Brown MD      levothyroxine  37.5 mcg Oral Early Morning WALLY Baptiste      melatonin  6 mg Oral HS WALLY Gomez      methocarbamol  500 mg Oral Q6H PRN WALLY Baptiste      OLANZapine  5 mg Oral Q4H PRN Max 3/day Basilio Brown MD      Or    OLANZapine  2.5 mg Intramuscular Q4H PRN Max 3/day Basilio Brown MD      OLANZapine  5 mg Oral Q3H PRN Max 3/day Basilio  Jose Boss MD      Or    OLANZapine  5 mg Intramuscular Q3H PRN Max 3/day Basilio Brown MD      OLANZapine  2.5 mg Oral Q4H PRN Max 6/day Basilio Brown MD      polyethylene glycol  17 g Oral Daily PRN Basilio Brown MD      propranolol  10 mg Oral Q8H PRN Basilio Brown MD      senna-docusate sodium  1 tablet Oral Daily PRN Basilio Brown MD      sertraline  150 mg Oral Daily WALLY Gomez      traZODone  50 mg Oral HS PRN Basilio Brown MD         Risks / Benefits of Treatment:  Risks, benefits, and possible side effects of medications explained to patient and patient verbalizes understanding and agreement for treatment.    Counseling / Coordination of Care:  Patient's progress reviewed with nursing staff.  Medications, treatment progress and treatment plan reviewed with patient.  Supportive counseling provided to the patient.    Total floor/unit time spent today 25 minutes. Greater than 50% of total time was spent with the patient and / or family counseling and / or coordination of care. A description of the counseling / coordination of care: medication education, treatment plan, supportive therapy.

## 2024-08-08 NOTE — NURSING NOTE
Patient is visible on the unit and in dayroom. Observed paying chess with BHT and is pleasant calm and compliant. Patient denies psych symptoms at this time and does not report any unmet needs. Continual rounding maintained for safety.

## 2024-08-08 NOTE — PLAN OF CARE
Problem: Ineffective Coping  Goal: Identifies ineffective coping skills  Outcome: Progressing  Goal: Identifies healthy coping skills  Outcome: Progressing  Goal: Demonstrates healthy coping skills  Outcome: Progressing  Goal: Participates in unit activities  Description: Interventions:  - Provide therapeutic environment   - Provide required programming   - Redirect inappropriate behaviors   Outcome: Progressing     Problem: Risk for Self Injury/Neglect  Goal: Treatment Goal: Remain safe during length of stay, learn and adopt new coping skills, and be free of self-injurious ideation, impulses and acts at the time of discharge  Outcome: Progressing     Problem: Depression  Goal: Treatment Goal: Demonstrate behavioral control of depressive symptoms, verbalize feelings of improved mood/affect, and adopt new coping skills prior to discharge  Outcome: Progressing  Goal: Verbalize thoughts and feelings  Description: Interventions:  - Assess and re-assess patient's level of risk   - Engage patient in 1:1 interactions, daily, for a minimum of 15 minutes   - Encourage patient to express feelings, fears, frustrations, hopes   Outcome: Progressing  Goal: Refrain from harming self  Description: Interventions:  - Monitor patient closely, per order   - Supervise medication ingestion, monitor effects and side effects   Outcome: Progressing  Goal: Refrain from isolation  Description: Interventions:  - Develop a trusting relationship   - Encourage socialization   Outcome: Progressing  Goal: Refrain from self-neglect  Outcome: Progressing  Goal: Attend and participate in unit activities, including therapeutic, recreational, and educational groups  Description: Interventions:  - Provide therapeutic and educational activities daily, encourage attendance and participation, and document same in the medical record   Outcome: Progressing  Goal: Complete daily ADLs, including personal hygiene independently, as able  Description:  Interventions:  - Observe, teach, and assist patient with ADLS  -  Monitor and promote a balance of rest/activity, with adequate nutrition and elimination   Outcome: Progressing     Problem: Anxiety  Goal: Anxiety is at manageable level  Description: Interventions:  - Assess and monitor patient's anxiety level.   - Monitor for signs and symptoms (heart palpitations, chest pain, shortness of breath, headaches, nausea, feeling jumpy, restlessness, irritable, apprehensive).   - Collaborate with interdisciplinary team and initiate plan and interventions as ordered.  - [place] patient to unit/surroundings  - Explain treatment plan  - Encourage participation in care  - Encourage verbalization of concerns/fears  - Identify coping mechanisms  - Assist in developing anxiety-reducing skills  - Administer/offer alternative therapies  - Limit or eliminate stimulants  Outcome: Progressing     Problem: Knowledge Deficit  Goal: Patient/family/caregiver demonstrates understanding of disease process, treatment plan, medications, and discharge instructions  Description: Complete learning assessment and assess knowledge base.  Interventions:  - Provide teaching at level of understanding  - Provide teaching via preferred learning methods  Outcome: Progressing     Problem: SLEEP DISTURBANCE  Goal: Will exhibit normal sleeping pattern  Description: Interventions:  -  Assess the patients sleep pattern, noting recent changes  - Administer medication as ordered  - Decrease environmental stimuli, including noise, as appropriate during the night  - Encourage the patient to actively participate in unit groups and or exercise during the day to enhance ability to achieve adequate sleep at night  - Assess the patient, in the morning, encouraging a description of sleep experience  Outcome: Progressing

## 2024-08-08 NOTE — PROGRESS NOTES
08/08/24 0808   Team Meeting   Meeting Type Daily Rounds   Team Members Present   Team Members Present Physician;Nurse;   Physician Team Member Lucero   [facility]   Care Management Team Member Noa   Patient/Family Present   Patient Present No   Patient's Family Present No     Pt is medication and meal compliant. Pt refused melatonin. Pt denies SI/HI/AVH. Pt is pending placement through [facility] [place].

## 2024-08-08 NOTE — NURSING NOTE
Pt is visible on the unit and interacting with nursing students. Denies SI/HI/AH/VH at this time. Med and meal compliant. Reporting he plans to go to a few groups today. Med and meal compliant.Denies any unmet needs or complaints at this time.

## 2024-08-09 LAB
CHOLEST SERPL-MCNC: 159 MG/DL
HDLC SERPL-MCNC: 54 MG/DL
LDLC SERPL CALC-MCNC: 87 MG/DL (ref 0–100)
NONHDLC SERPL-MCNC: 105 MG/DL
TRIGL SERPL-MCNC: 88 MG/DL

## 2024-08-09 PROCEDURE — 99232 SBSQ HOSP IP/OBS MODERATE 35: CPT

## 2024-08-09 PROCEDURE — 80061 LIPID PANEL: CPT | Performed by: PHYSICIAN ASSISTANT

## 2024-08-09 RX ADMIN — SERTRALINE HYDROCHLORIDE 150 MG: 100 TABLET ORAL at 08:17

## 2024-08-09 RX ADMIN — FAMOTIDINE 20 MG: 20 TABLET ORAL at 17:33

## 2024-08-09 RX ADMIN — CHOLECALCIFEROL TAB 25 MCG (1000 UNIT) 2000 UNITS: 25 TAB at 08:17

## 2024-08-09 RX ADMIN — ARIPIPRAZOLE 5 MG: 5 TABLET ORAL at 08:17

## 2024-08-09 RX ADMIN — ATORVASTATIN CALCIUM 10 MG: 10 TABLET, FILM COATED ORAL at 16:36

## 2024-08-09 RX ADMIN — FAMOTIDINE 20 MG: 20 TABLET ORAL at 08:17

## 2024-08-09 RX ADMIN — CYANOCOBALAMIN TAB 1000 MCG 1000 MCG: 1000 TAB at 08:17

## 2024-08-09 RX ADMIN — LEVOTHYROXINE SODIUM 37.5 MCG: 125 TABLET ORAL at 06:04

## 2024-08-09 NOTE — NURSING NOTE
In bed resting most of the shift. Denies symptoms. Pleasant, calm and cooperative. Compliant with medications. Waiting placement.

## 2024-08-09 NOTE — PROGRESS NOTES
"Progress Note - Behavioral Health   Franco Roberson 39 y.o. male MRN: [id number]  Unit/Bed#: [facility] 345-02 Encounter: [id number]      Assessment & Plan   Principal Problem:    MDD (major depressive disorder), recurrent severe, without psychosis (HCC)  Active Problems:    Unspecified depressive disorder (HCC)    Medical clearance for psychiatric admission    Hyperlipidemia    Vitamin D deficiency    B12 deficiency    Hypothyroidism    Rule Out Autism spectrum disorder      Subjective:  Patient was seen today for continuation of care, records reviewed and patient was discussed with the morning case review team.  Per staff, Franco has been meal and medication compliant. He is attending groups.  He slept last night.      Franco was seen today for psychiatric follow-up.  On assessment today, Franco was seen resting in bed.  Franco remained calm, pleasant and cooperative during the interview.  He reports his mood is \"good\" today and he plans on attending groups.  He slept well last night without the melatonin.  He reports a good appetite.     Franco denies acute suicidal/self-harm ideation/intent/plan upon direct inquiry at this time.  Franco remains behaviorally appropriate, no agitation or aggression noted on exam or in report.  Franco also denies HI/AH/VH, and does not appear overtly manic.  No overt delusions or paranoia are verbalized. Franco remains adherent to his current psychotropic medication regimen and denies any side effects from medications, as well as none noted on exam.    Recommended Treatment: Treatment plan and medication changes discussed and per the attending physician the plan is:    1.Continue with group therapy, milieu therapy and occupational therapy  2.Behavioral Health checks every 7 minutes  3.Continue frequent safety checks and vitals per unit protocol  4.Continue with SLIM medical management as indicated  5.Continue with current medication regimen: Continue Zoloft 150mg PO daily and Abilify 5mg PO " daily.  Franco is awaiting placement  6.Will review labs in the a.m.  7.Disposition Planning: Discharge planning and efforts remain ongoing    Vitals:  Vitals:    08/09/24 0719   BP: 133/72   Pulse: 71   Resp: 16   Temp: 97.5 °F (36.4 °C)   SpO2: 96%       Laboratory Results:  I have personally reviewed all pertinent laboratory/tests results.  Most Recent Labs:   Lab Results   Component Value Date    WBC 7.01 06/27/2024    RBC 4.54 06/27/2024    HGB 14.5 06/27/2024    HCT 44.4 06/27/2024     06/27/2024    RDW 12.0 06/27/2024    NEUTROABS 4.01 06/27/2024    SODIUM 139 07/10/2024    K 3.8 07/10/2024     07/10/2024    CO2 27 07/10/2024    BUN 16 07/10/2024    CREATININE 0.92 07/10/2024    GLUC 87 07/10/2024    GLUF 87 07/10/2024    CALCIUM 9.3 07/10/2024    AST 28 07/10/2024    ALT 36 07/10/2024    ALKPHOS 66 07/10/2024    TP 7.7 07/10/2024    ALB 4.4 07/10/2024    TBILI 0.55 07/10/2024    CHOLESTEROL 159 08/09/2024    HDL 54 08/09/2024    TRIG 88 08/09/2024    LDLCALC 87 08/09/2024    NONHDLC 105 08/09/2024    [id number] 5.705 (H) 07/10/2024    FREET4 0.54 (L) 07/10/2024       Psychiatric Review of Systems:  Behavior over the last 24 hours:  unchanged.   Sleep: adequate  Appetite: adequate  Medication side effects: denies  ROS: no complaints, denies shortness of breath or chest pain and all other systems are negative for acute changes    Mental Status Evaluation:    Appearance:  casually dressed, marginal hygiene, looks stated age   Behavior:  pleasant, cooperative, calm   Speech:  slow, soft   Mood:  euthymic   Affect:  constricted   Thought Process:  goal directed, linear   Associations: concrete associations   Thought Content:  no overt delusions   Perceptual Disturbances: none   Risk Potential: Suicidal ideation - None  Homicidal ideation - None  Potential for aggression - No   Sensorium:  oriented to person, place, and situation   Memory:  recent memory intact   Consciousness:  alert and awake    Attention/Concentration: attention span and concentration appear shorter than expected for age   Insight:  limited   Judgment: limited   Gait/Station: normal gait/station   Motor Activity: no abnormal movements     Progress Toward Goals:   This patient is stable and appears to be at baseline, however, requires ongoing hospitalization due to continued poor insight, judgement, and coping that poses a risk to patient in an unsupervised setting. Without medication management in place, patient is likely to decompensate rapidly and become a risk of danger to self or others.      Risk of Harm to Self:   Nursing Suicide Risk Assessment Last 24 hours: C-SSRS Risk (Since Last Contact)  Calculated C-SSRS Risk Score (Since Last Contact): No Risk Indicated  Current Specific Risk Factors include: diagnosis of mood disorder  Protective Factors: no current suicidal ideation, ability to communicate with staff on the unit, able to contract for safety on the unit, improved depressive symptoms  Based on today's assessment, Franco presents the following risk of harm to self: low    Risk of Harm to Others:  Nursing Homicide Risk Assessment: Violence Risk to Others: Denies within past 6 months  Current Specific Risk Factors include: social difficulties  Protective Factors: no current homicidal ideation, compliant with medications on the unit as ordered, compliant with unit milieu, follows staff redirection  Based on today's assessment, Franco presents the following risk of harm to others: low    The following interventions are recommended: behavioral checks every 7 minutes, continued hospitalization on locked unit        Behavioral Health Medications: all current active meds have been reviewed and continue current psychiatric medications.  Current Facility-Administered Medications   Medication Dose Route Frequency Provider Last Rate    acetaminophen  650 mg Oral Q6H PRN Basilio Brown MD      acetaminophen  650 mg Oral Q4H PRN  Basilio Brown MD      acetaminophen  975 mg Oral Q6H PRN Basilio Brown MD      aluminum-magnesium hydroxide-simethicone  30 mL Oral Q4H PRN Basilio Brown MD      ARIPiprazole  5 mg Oral Daily Basilio Panda MD      Artificial Tears  1 drop Both Eyes Q3H PRN Basilio Brown MD      atorvastatin  10 mg Oral Daily With Dinner WALLY Baptiste      benztropine  1 mg Intramuscular Q4H PRN Max 6/day Basilio Brown MD      benztropine  1 mg Oral Q4H PRN Max 6/day Basilio Brown MD      Cholecalciferol  2,000 Units Oral Daily WALLY Baptiste      cyanocobalamin  1,000 mcg Oral Daily WALLY Baptiste      Diclofenac Sodium  2 g Topical 4x Daily PRN WALLY Baptiste      hydrOXYzine HCL  50 mg Oral Q6H PRN Max 4/day Basilio Brown MD      Or    diphenhydrAMINE  50 mg Intramuscular Q6H PRN Basilio Brown MD      famotidine  20 mg Oral BID WALLY Baptiste      hydrOXYzine HCL  100 mg Oral Q6H PRN Max 4/day Basilio Brown MD      Or    LORazepam  2 mg Intramuscular Q6H PRN Basilio Brown MD      hydrOXYzine HCL  25 mg Oral Q6H PRN Max 4/day Basilio Brown MD      levothyroxine  37.5 mcg Oral Early Morning WALLY Baptiste      melatonin  6 mg Oral HS PRN WALLY Gomez      methocarbamol  500 mg Oral Q6H PRN WALLY Baptiste      OLANZapine  5 mg Oral Q4H PRN Max 3/day Basilio Brown MD      Or    OLANZapine  2.5 mg Intramuscular Q4H PRN Max 3/day Basilio Brown MD      OLANZapine  5 mg Oral Q3H PRN Max 3/day Basilio Brown MD      Or    OLANZapine  5 mg Intramuscular Q3H PRN Max 3/day Basilio Brown MD      OLANZapine  2.5 mg Oral Q4H PRN Max 6/day Basilio Brown MD      polyethylene glycol  17 g Oral Daily PRN Basilio Brown MD      propranolol  10 mg Oral Q8H PRN Basilio Brown MD      senna-docusate sodium  1 tablet Oral Daily PRN  Basilio Brown MD      sertraline  150 mg Oral Daily WALLY Gomez      traZODone  50 mg Oral HS PRN Basilio Brown MD         Risks / Benefits of Treatment:  Risks, benefits, and possible side effects of medications explained to patient and patient verbalizes understanding and agreement for treatment.    Counseling / Coordination of Care:  Patient's progress reviewed with nursing staff.  Medications, treatment progress and treatment plan reviewed with patient.  Supportive counseling provided to the patient.    Total floor/unit time spent today 25 minutes. Greater than 50% of total time was spent with the patient and / or family counseling and / or coordination of care. A description of the counseling / coordination of care: medication education, treatment plan, supportive therapy.

## 2024-08-09 NOTE — PLAN OF CARE
Problem: Ineffective Coping  Goal: Identifies ineffective coping skills  Outcome: Progressing  Goal: Identifies healthy coping skills  Outcome: Progressing  Goal: Demonstrates healthy coping skills  Outcome: Progressing  Goal: Participates in unit activities  Description: Interventions:  - Provide therapeutic environment   - Provide required programming   - Redirect inappropriate behaviors   Outcome: Progressing     Problem: Risk for Self Injury/Neglect  Goal: Treatment Goal: Remain safe during length of stay, learn and adopt new coping skills, and be free of self-injurious ideation, impulses and acts at the time of discharge  Outcome: Progressing     Problem: Depression  Goal: Treatment Goal: Demonstrate behavioral control of depressive symptoms, verbalize feelings of improved mood/affect, and adopt new coping skills prior to discharge  Outcome: Progressing  Goal: Verbalize thoughts and feelings  Description: Interventions:  - Assess and re-assess patient's level of risk   - Engage patient in 1:1 interactions, daily, for a minimum of 15 minutes   - Encourage patient to express feelings, fears, frustrations, hopes   Outcome: Progressing  Goal: Refrain from harming self  Description: Interventions:  - Monitor patient closely, per order   - Supervise medication ingestion, monitor effects and side effects   Outcome: Progressing  Goal: Refrain from isolation  Description: Interventions:  - Develop a trusting relationship   - Encourage socialization   Outcome: Progressing  Goal: Refrain from self-neglect  Outcome: Progressing  Goal: Attend and participate in unit activities, including therapeutic, recreational, and educational groups  Description: Interventions:  - Provide therapeutic and educational activities daily, encourage attendance and participation, and document same in the medical record   Outcome: Progressing  Goal: Complete daily ADLs, including personal hygiene independently, as able  Description:  Interventions:  - Observe, teach, and assist patient with ADLS  -  Monitor and promote a balance of rest/activity, with adequate nutrition and elimination   Outcome: Progressing     Problem: Anxiety  Goal: Anxiety is at manageable level  Description: Interventions:  - Assess and monitor patient's anxiety level.   - Monitor for signs and symptoms (heart palpitations, chest pain, shortness of breath, headaches, nausea, feeling jumpy, restlessness, irritable, apprehensive).   - Collaborate with interdisciplinary team and initiate plan and interventions as ordered.  - [place] patient to unit/surroundings  - Explain treatment plan  - Encourage participation in care  - Encourage verbalization of concerns/fears  - Identify coping mechanisms  - Assist in developing anxiety-reducing skills  - Administer/offer alternative therapies  - Limit or eliminate stimulants  Outcome: Progressing     Problem: Knowledge Deficit  Goal: Patient/family/caregiver demonstrates understanding of disease process, treatment plan, medications, and discharge instructions  Description: Complete learning assessment and assess knowledge base.  Interventions:  - Provide teaching at level of understanding  - Provide teaching via preferred learning methods  Outcome: Progressing     Problem: SLEEP DISTURBANCE  Goal: Will exhibit normal sleeping pattern  Description: Interventions:  -  Assess the patients sleep pattern, noting recent changes  - Administer medication as ordered  - Decrease environmental stimuli, including noise, as appropriate during the night  - Encourage the patient to actively participate in unit groups and or exercise during the day to enhance ability to achieve adequate sleep at night  - Assess the patient, in the morning, encouraging a description of sleep experience  Outcome: Progressing

## 2024-08-09 NOTE — PROGRESS NOTES
08/09/24 0853   Team Meeting   Meeting Type Daily Rounds   Team Members Present   Team Members Present Physician;Nurse;   Physician Team Member Lucero   [facility]   Patient/Family Present   Patient Present No   Patient's Family Present No     Pt is medication and meal compliant. Pt denies all SI/HI/AVH. Pt is awaiting placement.

## 2024-08-09 NOTE — NURSING NOTE
Patient is visible on the milieu. Remains withdrawn to self. Pleasant on approach. Denies all psych symptoms and does not report any unmet needs at this time. No scheduled meds ordered for tonight. No PRN's requested thus far. Continual rounding maintained for patient safety.

## 2024-08-10 PROCEDURE — 99232 SBSQ HOSP IP/OBS MODERATE 35: CPT | Performed by: STUDENT IN AN ORGANIZED HEALTH CARE EDUCATION/TRAINING PROGRAM

## 2024-08-10 RX ADMIN — ARIPIPRAZOLE 5 MG: 5 TABLET ORAL at 08:52

## 2024-08-10 RX ADMIN — FAMOTIDINE 20 MG: 20 TABLET ORAL at 17:20

## 2024-08-10 RX ADMIN — LEVOTHYROXINE SODIUM 37.5 MCG: 125 TABLET ORAL at 06:32

## 2024-08-10 RX ADMIN — ATORVASTATIN CALCIUM 10 MG: 10 TABLET, FILM COATED ORAL at 17:20

## 2024-08-10 RX ADMIN — CHOLECALCIFEROL TAB 25 MCG (1000 UNIT) 2000 UNITS: 25 TAB at 08:52

## 2024-08-10 RX ADMIN — SERTRALINE HYDROCHLORIDE 150 MG: 100 TABLET ORAL at 08:52

## 2024-08-10 RX ADMIN — FAMOTIDINE 20 MG: 20 TABLET ORAL at 08:52

## 2024-08-10 RX ADMIN — CYANOCOBALAMIN TAB 1000 MCG 1000 MCG: 1000 TAB at 08:52

## 2024-08-10 NOTE — NURSING NOTE
"Patient AA&O. Pleasant and cooperative with medications. Denies SI/HI/AH/VH. Upon assessment of mood, denies anxiety and depression, states, \"Everything is good, it's about the same.\" Denies any unmet needs at this time.  "

## 2024-08-10 NOTE — NURSING NOTE
Patient out of room on unit walking and watching tv. Patient appears depressed and withdrawn. Patient denies depression and SI/HI/AH/VH at this time. Patient complaint with medications and routine vitals.

## 2024-08-10 NOTE — PLAN OF CARE
Problem: Ineffective Coping  Goal: Identifies ineffective coping skills  Outcome: Progressing  Goal: Identifies healthy coping skills  Outcome: Progressing  Goal: Demonstrates healthy coping skills  Outcome: Progressing  Goal: Participates in unit activities  Description: Interventions:  - Provide therapeutic environment   - Provide required programming   - Redirect inappropriate behaviors   Outcome: Progressing     Problem: Risk for Self Injury/Neglect  Goal: Treatment Goal: Remain safe during length of stay, learn and adopt new coping skills, and be free of self-injurious ideation, impulses and acts at the time of discharge  Outcome: Progressing     Problem: Depression  Goal: Treatment Goal: Demonstrate behavioral control of depressive symptoms, verbalize feelings of improved mood/affect, and adopt new coping skills prior to discharge  Outcome: Progressing  Goal: Verbalize thoughts and feelings  Description: Interventions:  - Assess and re-assess patient's level of risk   - Engage patient in 1:1 interactions, daily, for a minimum of 15 minutes   - Encourage patient to express feelings, fears, frustrations, hopes   Outcome: Progressing  Goal: Refrain from harming self  Description: Interventions:  - Monitor patient closely, per order   - Supervise medication ingestion, monitor effects and side effects   Outcome: Progressing  Goal: Refrain from isolation  Description: Interventions:  - Develop a trusting relationship   - Encourage socialization   Outcome: Progressing  Goal: Refrain from self-neglect  Outcome: Progressing  Goal: Attend and participate in unit activities, including therapeutic, recreational, and educational groups  Description: Interventions:  - Provide therapeutic and educational activities daily, encourage attendance and participation, and document same in the medical record   Outcome: Progressing  Goal: Complete daily ADLs, including personal hygiene independently, as able  Description:  Interventions:  - Observe, teach, and assist patient with ADLS  -  Monitor and promote a balance of rest/activity, with adequate nutrition and elimination   Outcome: Progressing     Problem: Anxiety  Goal: Anxiety is at manageable level  Description: Interventions:  - Assess and monitor patient's anxiety level.   - Monitor for signs and symptoms (heart palpitations, chest pain, shortness of breath, headaches, nausea, feeling jumpy, restlessness, irritable, apprehensive).   - Collaborate with interdisciplinary team and initiate plan and interventions as ordered.  - [place] patient to unit/surroundings  - Explain treatment plan  - Encourage participation in care  - Encourage verbalization of concerns/fears  - Identify coping mechanisms  - Assist in developing anxiety-reducing skills  - Administer/offer alternative therapies  - Limit or eliminate stimulants  Outcome: Progressing     Problem: DISCHARGE PLANNING - CARE MANAGEMENT  Goal: Discharge to post-acute care or home with appropriate resources  Description: INTERVENTIONS:  - Conduct assessment to determine patient/family and health care team treatment goals, and need for post-acute services based on payer coverage, community resources, and patient preferences, and barriers to discharge  - Address psychosocial, clinical, and financial barriers to discharge as identified in assessment in conjunction with the patient/family and health care team  - Arrange appropriate level of post-acute services according to patient’s   needs and preference and payer coverage in collaboration with the physician and health care team  - Communicate with and update the patient/family, physician, and health care team regarding progress on the discharge plan  - Arrange appropriate transportation to post-acute venues  Outcome: Progressing     Problem: Knowledge Deficit  Goal: Patient/family/caregiver demonstrates understanding of disease process, treatment plan, medications, and discharge  instructions  Description: Complete learning assessment and assess knowledge base.  Interventions:  - Provide teaching at level of understanding  - Provide teaching via preferred learning methods  Outcome: Progressing     Problem: SLEEP DISTURBANCE  Goal: Will exhibit normal sleeping pattern  Description: Interventions:  -  Assess the patients sleep pattern, noting recent changes  - Administer medication as ordered  - Decrease environmental stimuli, including noise, as appropriate during the night  - Encourage the patient to actively participate in unit groups and or exercise during the day to enhance ability to achieve adequate sleep at night  - Assess the patient, in the morning, encouraging a description of sleep experience  Outcome: Progressing

## 2024-08-10 NOTE — PROGRESS NOTES
"Progress Note - Behavioral Health   Franco Roberson 39 y.o. male MRN: [id number]  Unit/Bed#: [facility] 345-02 Encounter: [id number]    All documentation, nursing notes, labs, and vitals reviewed.  The patient's medication reconciliation chart was also analyzed for medication adherence.  I personally evaluated Franco Roberson and discussed current care with treatment team.    No acute events overnight. Staff reported some concern regarding Franco's erratic sleep pattern which was addressed today. He denies subjective concerns, stating that his sleep is \"fine\" and that he finds it to be restorative. Acutely, Franco's appetite is stable. He denies SI/HI when asked. He does report some frustration with feeling stagnant on the unit, given pending placement. Supportive therapy and CBT utilized. We processed the importance of \"making small changes in thinking and behavior every day\", which is the foundation of CBT. He was receptive. We also discussed how \"a steady drip of water caves a stone\" and the importance of persistence, rather than blunt force, to create sustainable change. He was pleasantly engaged. Franco reports tolerability and benefit from his medication change. He denies new onset depression or anxiety concerns. His energy, motivation, and cognition are stable. No anhedonia or crying spells. Franco is not tense, on-edge or restless today. During today's examination, Franco does not exhibit objective evidence of patrick/hypomania or psychosis. Franco is without perceptual disturbances, such as A/V hallucinations, paranoia, ideas of reference, or delusional beliefs. Franco offers no further concerns.     Mental Status Evaluation:    Appearance:  marginal hygiene, looks stated age   Behavior:  pleasant, cooperative, calm   Speech:  slow, soft   Mood:  euthymic   Affect:  constricted   Thought Process:  logical, coherent, goal directed   Associations: intact associations   Thought Content:  no overt delusions   Perceptual " Disturbances: no auditory hallucinations, no visual hallucinations   Risk Potential: Suicidal ideation - None at present  Homicidal ideation - None at present  Potential for aggression - No   Sensorium:  oriented to person, place, and time/date   Memory:  recent and remote memory grossly intact   Consciousness:  alert and awake    Attention: attention span and concentration are age appropriate   Insight:  good   Judgment: good   Gait/Station: in bed   Motor Activity: no abnormal movements       Assessment:     Principal Problem:    MDD (major depressive disorder), recurrent severe, without psychosis (HCC)  Active Problems:    Unspecified depressive disorder (HCC)    Medical clearance for psychiatric admission    Hyperlipidemia    Vitamin D deficiency    B12 deficiency    Hypothyroidism    Rule Out Autism spectrum disorder      Plan/Recommended Treatment:     - Continue with pharmacotherapy, group therapy, milieu therapy and occupational therapy.    - Risks/benefits/alternatives to treatment discussed and Franco Roberson continues to verbalize understanding   - No psychopharmacologic changes necessary at this juncture, continue scheduled psychotropic agents at current doses (see below)   - Will consider further optimization of psychotropic medication regimen as hospital course progresses   - Continue to assess for adverse medication side effects.  - Medical management as per SLIM recs  - Encourage Franco Roberson to participate in nonverbal forms of therapy including journaling and art/music therapy  - Continue precautionary Q7-minute safety checks.  - Continue to engage case management/SW to assist with collateral information, discharge planning, and the implementation of an individualized, patient-centered plan of care.  - The patient will be maintained on the following medications:    Current Facility-Administered Medications   Medication Dose Route Frequency Provider Last Rate    acetaminophen  650 mg Oral Q6H SHELBY [place]  Jose Boss MD      acetaminophen  650 mg Oral Q4H PRN Basilio Brown MD      acetaminophen  975 mg Oral Q6H PRN Basilio Brown MD      aluminum-magnesium hydroxide-simethicone  30 mL Oral Q4H PRN Basilio Brown MD      ARIPiprazole  5 mg Oral Daily Basilio Panda MD      Artificial Tears  1 drop Both Eyes Q3H PRN Basilio Brown MD      atorvastatin  10 mg Oral Daily With Dinner WALLY Baptiste      benztropine  1 mg Intramuscular Q4H PRN Max 6/day Basilio Brown MD      benztropine  1 mg Oral Q4H PRN Max 6/day Basilio Brown MD      Cholecalciferol  2,000 Units Oral Daily WALLY Baptiste      cyanocobalamin  1,000 mcg Oral Daily WALLY Baptiste      Diclofenac Sodium  2 g Topical 4x Daily PRN WALLY Baptiste      hydrOXYzine HCL  50 mg Oral Q6H PRN Max 4/day Basilio Brown MD      Or    diphenhydrAMINE  50 mg Intramuscular Q6H PRN Basilio Brown MD      famotidine  20 mg Oral BID WALLY Baptiste      hydrOXYzine HCL  100 mg Oral Q6H PRN Max 4/day Basilio Brown MD      Or    LORazepam  2 mg Intramuscular Q6H PRN Basilio Brown MD      hydrOXYzine HCL  25 mg Oral Q6H PRN Max 4/day Basilio Brown MD      levothyroxine  37.5 mcg Oral Early Morning WALLY Baptiste      melatonin  6 mg Oral HS PRN WALLY Gomez      methocarbamol  500 mg Oral Q6H PRN WALLY Baptiste      OLANZapine  5 mg Oral Q4H PRN Max 3/day Basilio Brown MD      Or    OLANZapine  2.5 mg Intramuscular Q4H PRN Max 3/day Basilio Brown MD      OLANZapine  5 mg Oral Q3H PRN Max 3/day Basilio Brown MD      Or    OLANZapine  5 mg Intramuscular Q3H PRN Max 3/day Basilio Brown MD      OLANZapine  2.5 mg Oral Q4H PRN Max 6/day Basilio Brown MD      polyethylene glycol  17 g Oral Daily PRN Basilio Brown MD      propranolol  10 mg Oral Q8H PRN Basilio Brown,  MD      senna-docusate sodium  1 tablet Oral Daily PRN Basilio Brown MD      sertraline  150 mg Oral Daily WALLY Gomez      traZODone  50 mg Oral HS PRN Basilio Brown MD

## 2024-08-10 NOTE — NURSING NOTE
Patient is mainly isolative to room, out for needs. Patient naps on and off. Patient ate dinner in room. Patient has a sad affect. Patient brightens up when this writer engages in conversation. Patient denies all psych symptoms. Patient is compliant with scheduled medications. Q7 observation maintained.

## 2024-08-11 PROCEDURE — 99232 SBSQ HOSP IP/OBS MODERATE 35: CPT | Performed by: STUDENT IN AN ORGANIZED HEALTH CARE EDUCATION/TRAINING PROGRAM

## 2024-08-11 RX ADMIN — ARIPIPRAZOLE 5 MG: 5 TABLET ORAL at 09:27

## 2024-08-11 RX ADMIN — LEVOTHYROXINE SODIUM 37.5 MCG: 125 TABLET ORAL at 06:38

## 2024-08-11 RX ADMIN — SERTRALINE HYDROCHLORIDE 150 MG: 100 TABLET ORAL at 09:27

## 2024-08-11 RX ADMIN — ATORVASTATIN CALCIUM 10 MG: 10 TABLET, FILM COATED ORAL at 17:22

## 2024-08-11 RX ADMIN — FAMOTIDINE 20 MG: 20 TABLET ORAL at 09:28

## 2024-08-11 RX ADMIN — CHOLECALCIFEROL TAB 25 MCG (1000 UNIT) 2000 UNITS: 25 TAB at 09:28

## 2024-08-11 RX ADMIN — CYANOCOBALAMIN TAB 1000 MCG 1000 MCG: 1000 TAB at 09:28

## 2024-08-11 RX ADMIN — FAMOTIDINE 20 MG: 20 TABLET ORAL at 17:22

## 2024-08-11 NOTE — PLAN OF CARE
Problem: Ineffective Coping  Goal: Identifies ineffective coping skills  Outcome: Progressing  Goal: Identifies healthy coping skills  Outcome: Progressing  Goal: Demonstrates healthy coping skills  Outcome: Progressing  Goal: Participates in unit activities  Description: Interventions:  - Provide therapeutic environment   - Provide required programming   - Redirect inappropriate behaviors   Outcome: Progressing     Problem: Risk for Self Injury/Neglect  Goal: Treatment Goal: Remain safe during length of stay, learn and adopt new coping skills, and be free of self-injurious ideation, impulses and acts at the time of discharge  Outcome: Progressing     Problem: Depression  Goal: Treatment Goal: Demonstrate behavioral control of depressive symptoms, verbalize feelings of improved mood/affect, and adopt new coping skills prior to discharge  Outcome: Progressing  Goal: Verbalize thoughts and feelings  Description: Interventions:  - Assess and re-assess patient's level of risk   - Engage patient in 1:1 interactions, daily, for a minimum of 15 minutes   - Encourage patient to express feelings, fears, frustrations, hopes   Outcome: Progressing  Goal: Refrain from harming self  Description: Interventions:  - Monitor patient closely, per order   - Supervise medication ingestion, monitor effects and side effects   Outcome: Progressing  Goal: Refrain from isolation  Description: Interventions:  - Develop a trusting relationship   - Encourage socialization   Outcome: Progressing  Goal: Refrain from self-neglect  Outcome: Progressing  Goal: Attend and participate in unit activities, including therapeutic, recreational, and educational groups  Description: Interventions:  - Provide therapeutic and educational activities daily, encourage attendance and participation, and document same in the medical record   Outcome: Progressing  Goal: Complete daily ADLs, including personal hygiene independently, as able  Description:  Interventions:  - Observe, teach, and assist patient with ADLS  -  Monitor and promote a balance of rest/activity, with adequate nutrition and elimination   Outcome: Progressing     Problem: Anxiety  Goal: Anxiety is at manageable level  Description: Interventions:  - Assess and monitor patient's anxiety level.   - Monitor for signs and symptoms (heart palpitations, chest pain, shortness of breath, headaches, nausea, feeling jumpy, restlessness, irritable, apprehensive).   - Collaborate with interdisciplinary team and initiate plan and interventions as ordered.  - [place] patient to unit/surroundings  - Explain treatment plan  - Encourage participation in care  - Encourage verbalization of concerns/fears  - Identify coping mechanisms  - Assist in developing anxiety-reducing skills  - Administer/offer alternative therapies  - Limit or eliminate stimulants  Outcome: Progressing     Problem: DISCHARGE PLANNING - CARE MANAGEMENT  Goal: Discharge to post-acute care or home with appropriate resources  Description: INTERVENTIONS:  - Conduct assessment to determine patient/family and health care team treatment goals, and need for post-acute services based on payer coverage, community resources, and patient preferences, and barriers to discharge  - Address psychosocial, clinical, and financial barriers to discharge as identified in assessment in conjunction with the patient/family and health care team  - Arrange appropriate level of post-acute services according to patient’s   needs and preference and payer coverage in collaboration with the physician and health care team  - Communicate with and update the patient/family, physician, and health care team regarding progress on the discharge plan  - Arrange appropriate transportation to post-acute venues  Outcome: Progressing     Problem: Knowledge Deficit  Goal: Patient/family/caregiver demonstrates understanding of disease process, treatment plan, medications, and discharge  instructions  Description: Complete learning assessment and assess knowledge base.  Interventions:  - Provide teaching at level of understanding  - Provide teaching via preferred learning methods  Outcome: Progressing     Problem: SLEEP DISTURBANCE  Goal: Will exhibit normal sleeping pattern  Description: Interventions:  -  Assess the patients sleep pattern, noting recent changes  - Administer medication as ordered  - Decrease environmental stimuli, including noise, as appropriate during the night  - Encourage the patient to actively participate in unit groups and or exercise during the day to enhance ability to achieve adequate sleep at night  - Assess the patient, in the morning, encouraging a description of sleep experience  Outcome: Progressing

## 2024-08-11 NOTE — PROGRESS NOTES
"Progress Note - Behavioral Health   Franco Roberson 39 y.o. male MRN: [id number]  Unit/Bed#: [facility] 345-02 Encounter: [id number]    All documentation, nursing notes, labs, and vitals reviewed.  The patient's medication reconciliation chart was also analyzed for medication adherence.  I personally evaluated Franco Roberson and discussed current care with treatment team.    No acute events overnight. Franco is pleasant and cooperative today. He shares that his overall state of MH is \"good\". His sleep remains erratic but is subjectively not problematic. His appetite is stable. He shares that he is attempting to exercise more and was observed to be ambulating around the unit. Franco continues to deny SI/HI. He is hopeful he will find placement soon and shared that there was a recent referral is \"[facility]\". Franco is without anhedonia or feelings of despair. His anxiety is well controlled. Franco is not tense, restless or on-edge today. During today's examination, Franco does not exhibit objective evidence of patrick/hypomania or psychosis. Franco is without perceptual disturbances, such as A/V hallucinations, paranoia, ideas of reference, or delusional beliefs. 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  Franco offers no further concerns.     Mental Status Evaluation:    Appearance:  marginal hygiene, looks stated age   Behavior:  pleasant, cooperative, calm, good eye contact   Speech:  normal volume, normal pitch, clear   Mood:  euthymic   Affect:  appropriate   Thought Process:  logical, coherent   Associations: intact associations   Thought Content:  no overt delusions   Perceptual Disturbances: no auditory hallucinations, no visual hallucinations   Risk Potential: Suicidal ideation - None at present  Homicidal ideation - None at present  Potential for aggression - No   Sensorium:  oriented to person, place, and time/date   Memory:  recent and remote memory " grossly intact   Consciousness:  alert and awake    Attention: attention span and concentration are age appropriate   Insight:  good   Judgment: good   Gait/Station: normal gait/station   Motor Activity: no abnormal movements       Assessment:     Principal Problem:    MDD (major depressive disorder), recurrent severe, without psychosis (HCC)  Active Problems:    Unspecified depressive disorder (HCC)    Medical clearance for psychiatric admission    Hyperlipidemia    Vitamin D deficiency    B12 deficiency    Hypothyroidism    Rule Out Autism spectrum disorder      Plan/Recommended Treatment:     - Continue with pharmacotherapy, group therapy, milieu therapy and occupational therapy.    - Risks/benefits/alternatives to treatment discussed and Franco Roberson continues to verbalize understanding   - No psychopharmacologic changes necessary at this juncture, continue scheduled psychotropic agents at current doses (see below)   - Will consider further optimization of psychotropic medication regimen as hospital course progresses   - Continue to assess for adverse medication side effects.  - Medical management as per SLIM recs  - Encourage Franco Roberson to participate in nonverbal forms of therapy including journaling and art/music therapy  - Continue precautionary Q7-minute safety checks.  - Continue to engage case management/SW to assist with collateral information, discharge planning, and the implementation of an individualized, patient-centered plan of care.  - The patient will be maintained on the following medications:    Current Facility-Administered Medications   Medication Dose Route Frequency Provider Last Rate    acetaminophen  650 mg Oral Q6H PRN aBsilio Brown MD      acetaminophen  650 mg Oral Q4H PRN Basilio Brown MD      acetaminophen  975 mg Oral Q6H PRN Basilio Brown MD      aluminum-magnesium hydroxide-simethicone  30 mL Oral Q4H PRN Basilio Brown MD      ARIPiprazole  5 mg Oral Daily  Basilio Panda MD      Artificial Tears  1 drop Both Eyes Q3H PRN Basilio Brown MD      atorvastatin  10 mg Oral Daily With Dinner WALLY Baptiste      benztropine  1 mg Intramuscular Q4H PRN Max 6/day Basilio Brown MD      benztropine  1 mg Oral Q4H PRN Max 6/day Basilio Brown MD      Cholecalciferol  2,000 Units Oral Daily WALLY Baptiste      cyanocobalamin  1,000 mcg Oral Daily WALLY Baptiste      Diclofenac Sodium  2 g Topical 4x Daily PRN WALLY Baptiste      hydrOXYzine HCL  50 mg Oral Q6H PRN Max 4/day Basilio Brown MD      Or    diphenhydrAMINE  50 mg Intramuscular Q6H PRN Basilio Brown MD      famotidine  20 mg Oral BID WALLY Baptiste      hydrOXYzine HCL  100 mg Oral Q6H PRN Max 4/day Basilio Brown MD      Or    LORazepam  2 mg Intramuscular Q6H PRN Basilio Brown MD      hydrOXYzine HCL  25 mg Oral Q6H PRN Max 4/day Basilio Brown MD      levothyroxine  37.5 mcg Oral Early Morning WALLY Baptiste      melatonin  6 mg Oral HS PRN WALLY Gomez      methocarbamol  500 mg Oral Q6H PRN WALLY Baptiste      OLANZapine  5 mg Oral Q4H PRN Max 3/day Basilio Brown MD      Or    OLANZapine  2.5 mg Intramuscular Q4H PRN Max 3/day Basilio Brown MD      OLANZapine  5 mg Oral Q3H PRN Max 3/day Basilio Brown MD      Or    OLANZapine  5 mg Intramuscular Q3H PRN Max 3/day Basilio Brown MD      OLANZapine  2.5 mg Oral Q4H PRN Max 6/day Basilio Brown MD      polyethylene glycol  17 g Oral Daily PRN Basilio Brown MD      propranolol  10 mg Oral Q8H PRN Basilio Brown MD      senna-docusate sodium  1 tablet Oral Daily PRN Basilio Brown MD      sertraline  150 mg Oral Daily WALLY Gomez      traZODone  50 mg Oral HS PRN Basilio Brown MD

## 2024-08-11 NOTE — NURSING NOTE
Patient was assessed in hallway during medication administration, patient reports no AVH/SI/HI. He has been medication compliant. Patient has had no behavioral issues to note. Patient has been med compliant. Patient states they have no further needs at this time. Franco smith upon approach during assessment. He voices continued hope he will have placement soon.

## 2024-08-12 PROCEDURE — 99232 SBSQ HOSP IP/OBS MODERATE 35: CPT

## 2024-08-12 RX ADMIN — ARIPIPRAZOLE 5 MG: 5 TABLET ORAL at 08:13

## 2024-08-12 RX ADMIN — CHOLECALCIFEROL TAB 25 MCG (1000 UNIT) 2000 UNITS: 25 TAB at 08:13

## 2024-08-12 RX ADMIN — LEVOTHYROXINE SODIUM 37.5 MCG: 125 TABLET ORAL at 06:18

## 2024-08-12 RX ADMIN — CYANOCOBALAMIN TAB 1000 MCG 1000 MCG: 1000 TAB at 08:13

## 2024-08-12 RX ADMIN — FAMOTIDINE 20 MG: 20 TABLET ORAL at 08:13

## 2024-08-12 RX ADMIN — FAMOTIDINE 20 MG: 20 TABLET ORAL at 17:10

## 2024-08-12 RX ADMIN — ATORVASTATIN CALCIUM 10 MG: 10 TABLET, FILM COATED ORAL at 17:10

## 2024-08-12 RX ADMIN — SERTRALINE HYDROCHLORIDE 150 MG: 100 TABLET ORAL at 08:13

## 2024-08-12 NOTE — PLAN OF CARE
Pt attends some groups and participates appropriately when in groups. Pt does mention he is starting to feel difficulty with waiting in the hospital for so long. Pt insightful knowing he needs to and it will benefit him, but the wait is getting difficult.

## 2024-08-12 NOTE — PROGRESS NOTES
08/12/24 0846   Team Meeting   Meeting Type Daily Rounds   Team Members Present   Team Members Present Physician;Nurse;   Physician Team Member Vijay   Nursing Team Member Tamia   Care Management Team Member Suhas   Patient/Family Present   Patient Present No   Patient's Family Present No     Pt med/meal compliant. Visible on the unit. Social with peers. Denying symptoms. Discharge pending placement.

## 2024-08-12 NOTE — PROGRESS NOTES
"Progress Note - Behavioral Health   Franco Roberson 39 y.o. male MRN: [id number]  Unit/Bed#: [facility] 345-02 Encounter: [id number]      Assessment & Plan   Principal Problem:    MDD (major depressive disorder), recurrent severe, without psychosis (HCC)  Active Problems:    Unspecified depressive disorder (HCC)    Medical clearance for psychiatric admission    Hyperlipidemia    Vitamin D deficiency    B12 deficiency    Hypothyroidism    Rule Out Autism spectrum disorder      Subjective:  Patient was seen today for continuation of care, records reviewed and patient was discussed with the morning case review team.  Per staff, Franco was calm, cooperative and visible over the weekend.  He is meal and medication compliant.     Franco was seen today for psychiatric follow-up.  On assessment today, Franco was seen in the hallway away from peers.  He reports that his sleep has been good and he feels \"rested\" in the morning.  He does sometimes take a nap during the day but it does not effect his sleep at night.  His mood has been good but he is anxious to find out about where he might be placed.  Reassurance provided.  He has not other complaints today.      Franco denies acute suicidal/self-harm ideation/intent/plan upon direct inquiry at this time.  Franco remains behaviorally appropriate, no agitation or aggression noted on exam or in report.  Franco also denies HI/AH/VH, and does not appear overtly manic.  No overt delusions or paranoia are verbalized.   Franco remains adherent to his current psychotropic medication regimen and denies any side effects from medications, as well as none noted on exam.    Recommended Treatment: Treatment plan and medication changes discussed and per the attending physician the plan is:    1.Continue with group therapy, milieu therapy and occupational therapy  2.Behavioral Health checks every 7 minutes  3.Continue frequent safety checks and vitals per unit protocol  4.Continue with SLIM medical " management as indicated  5.Continue with current medication regimen: Zoloft 150mg PO daily and Abilify 5mg PO daily.  Franco is awaiting placement   6.Will review labs in the a.m.  7.Disposition Planning: Discharge planning and efforts remain ongoing    Vitals:  Vitals:    08/12/24 0744   BP: 121/67   Pulse: 72   Resp: 16   Temp: (!) 96.8 °F (36 °C)   SpO2: 97%       Laboratory Results:  I have personally reviewed all pertinent laboratory/tests results.  Most Recent Labs:   Lab Results   Component Value Date    WBC 7.01 06/27/2024    RBC 4.54 06/27/2024    HGB 14.5 06/27/2024    HCT 44.4 06/27/2024     06/27/2024    RDW 12.0 06/27/2024    NEUTROABS 4.01 06/27/2024    SODIUM 139 07/10/2024    K 3.8 07/10/2024     07/10/2024    CO2 27 07/10/2024    BUN 16 07/10/2024    CREATININE 0.92 07/10/2024    GLUC 87 07/10/2024    GLUF 87 07/10/2024    CALCIUM 9.3 07/10/2024    AST 28 07/10/2024    ALT 36 07/10/2024    ALKPHOS 66 07/10/2024    TP 7.7 07/10/2024    ALB 4.4 07/10/2024    TBILI 0.55 07/10/2024    CHOLESTEROL 159 08/09/2024    HDL 54 08/09/2024    TRIG 88 08/09/2024    LDLCALC 87 08/09/2024    NONHDLC 105 08/09/2024    [id number] 5.705 (H) 07/10/2024    FREET4 0.54 (L) 07/10/2024       Psychiatric Review of Systems:  Behavior over the last 24 hours:  unchanged.   Sleep: adequate, wakes early, takes a nap in late morning  Appetite: adequate  Medication side effects: denies  ROS: no complaints, denies shortness of breath or chest pain and all other systems are negative for acute changes    Mental Status Evaluation:    Appearance:  marginal hygiene, looks stated age   Behavior:  pleasant, cooperative, calm   Speech:  normal rate and volume   Mood:  euthymic   Affect:  constricted   Thought Process:  goal directed, linear   Associations: intact associations   Thought Content:  no overt delusions   Perceptual Disturbances: none   Risk Potential: Suicidal ideation - None  Homicidal ideation -  None  Potential for aggression - Not at present   Sensorium:  oriented to person, place, time/date, and situation   Memory:  recent memory intact   Consciousness:  alert and awake   Attention/Concentration: attention span and concentration are age appropriate   Insight:  improving   Judgment: improving   Gait/Station: normal gait/station   Motor Activity: no abnormal movements     Progress Toward Goals:   This patient is stable and appears to be at baseline, however, requires ongoing hospitalization due to continued poor insight, judgement, and coping that poses a risk to patient in an unsupervised setting. Without medication management in place, patient is likely to decompensate rapidly and become a risk of danger to self or others.      Risk of Harm to Self:   Nursing Suicide Risk Assessment Last 24 hours: C-SSRS Risk (Since Last Contact)  Calculated C-SSRS Risk Score (Since Last Contact): No Risk Indicated  Current Specific Risk Factors include: diagnosis of mood disorder  Protective Factors: no current suicidal ideation, ability to communicate with staff on the unit, able to contract for safety on the unit, taking medications as ordered on the unit, ability to make plans for the future, improved mood, responds to redirection  Based on today's assessment, Franco presents the following risk of harm to self: low    Risk of Harm to Others:  Nursing Homicide Risk Assessment: Violence Risk to Others: Denies within past 6 months  Current Specific Risk Factors include: diagnosis of mood disorder  Protective Factors: no current homicidal ideation, improved mood, no current psychotic symptoms, compliant with medications on the unit as ordered, compliant with unit milieu, follows staff redirection  Based on today's assessmentFranco presents the following risk of harm to others: low    The following interventions are recommended: behavioral checks every 7 minutes, continued hospitalization on locked unit        Behavioral  Health Medications: all current active meds have been reviewed and continue current psychiatric medications.  Current Facility-Administered Medications   Medication Dose Route Frequency Provider Last Rate    acetaminophen  650 mg Oral Q6H PRN Basilio Brown MD      acetaminophen  650 mg Oral Q4H PRN Basilio Brown MD      acetaminophen  975 mg Oral Q6H PRN Basilio Brown MD      aluminum-magnesium hydroxide-simethicone  30 mL Oral Q4H PRN Basilio Brown MD      ARIPiprazole  5 mg Oral Daily Basilio Panda MD      Artificial Tears  1 drop Both Eyes Q3H PRN Basilio Brown MD      atorvastatin  10 mg Oral Daily With Dinner WALLY Baptiste      benztropine  1 mg Intramuscular Q4H PRN Max 6/day Basilio Brown MD      benztropine  1 mg Oral Q4H PRN Max 6/day Basilio Brown MD      Cholecalciferol  2,000 Units Oral Daily WALLY Baptiste      cyanocobalamin  1,000 mcg Oral Daily WALLY Baptiste      Diclofenac Sodium  2 g Topical 4x Daily PRN WALLY Baptiste      hydrOXYzine HCL  50 mg Oral Q6H PRN Max 4/day Basilio Brown MD      Or    diphenhydrAMINE  50 mg Intramuscular Q6H PRN Basilio Brown MD      famotidine  20 mg Oral BID WALLY Baptiste      hydrOXYzine HCL  100 mg Oral Q6H PRN Max 4/day Basilio Brown MD      Or    LORazepam  2 mg Intramuscular Q6H PRN Basilio Brown MD      hydrOXYzine HCL  25 mg Oral Q6H PRN Max 4/day Basilio Brown MD      levothyroxine  37.5 mcg Oral Early Morning WALLY Baptiste      melatonin  6 mg Oral HS PRN WALLY Gomez      methocarbamol  500 mg Oral Q6H PRN WALLY Baptiste      OLANZapine  5 mg Oral Q4H PRN Max 3/day Basilio Brown MD      Or    OLANZapine  2.5 mg Intramuscular Q4H PRN Max 3/day Basilio Brown MD      OLANZapine  5 mg Oral Q3H PRN Max 3/day Basilio Brown MD      Or    OLANZapine  5 mg  Intramuscular Q3H PRN Max 3/day Basilio Brown MD      OLANZapine  2.5 mg Oral Q4H PRN Max 6/day Basilio Brown MD      polyethylene glycol  17 g Oral Daily PRN Basilio Brown MD      propranolol  10 mg Oral Q8H PRN Basilio Brown MD      senna-docusate sodium  1 tablet Oral Daily PRN Basilio Brown MD      sertraline  150 mg Oral Daily WALLY Gomez      traZODone  50 mg Oral HS PRN Basilio Brown MD         Risks / Benefits of Treatment:  Risks, benefits, and possible side effects of medications explained to patient and patient verbalizes understanding and agreement for treatment.    Counseling / Coordination of Care:  Patient's progress reviewed with nursing staff.  Medications, treatment progress and treatment plan reviewed with patient.  Supportive counseling provided to the patient.    Total floor/unit time spent today 25 minutes. Greater than 50% of total time was spent with the patient and / or family counseling and / or coordination of care. A description of the counseling / coordination of care: medication education, treatment plan, supportive therapy.

## 2024-08-12 NOTE — PLAN OF CARE
Problem: Ineffective Coping  Goal: Identifies ineffective coping skills  Outcome: Progressing  Goal: Identifies healthy coping skills  Outcome: Progressing  Goal: Demonstrates healthy coping skills  Outcome: Progressing  Goal: Participates in unit activities  Description: Interventions:  - Provide therapeutic environment   - Provide required programming   - Redirect inappropriate behaviors   Outcome: Progressing     Problem: Risk for Self Injury/Neglect  Goal: Treatment Goal: Remain safe during length of stay, learn and adopt new coping skills, and be free of self-injurious ideation, impulses and acts at the time of discharge  Outcome: Progressing     Problem: Depression  Goal: Treatment Goal: Demonstrate behavioral control of depressive symptoms, verbalize feelings of improved mood/affect, and adopt new coping skills prior to discharge  Outcome: Progressing  Goal: Verbalize thoughts and feelings  Description: Interventions:  - Assess and re-assess patient's level of risk   - Engage patient in 1:1 interactions, daily, for a minimum of 15 minutes   - Encourage patient to express feelings, fears, frustrations, hopes   Outcome: Progressing  Goal: Refrain from harming self  Description: Interventions:  - Monitor patient closely, per order   - Supervise medication ingestion, monitor effects and side effects   Outcome: Progressing  Goal: Refrain from isolation  Description: Interventions:  - Develop a trusting relationship   - Encourage socialization   Outcome: Progressing  Goal: Refrain from self-neglect  Outcome: Progressing  Goal: Attend and participate in unit activities, including therapeutic, recreational, and educational groups  Description: Interventions:  - Provide therapeutic and educational activities daily, encourage attendance and participation, and document same in the medical record   Outcome: Progressing  Goal: Complete daily ADLs, including personal hygiene independently, as able  Description:  Interventions:  - Observe, teach, and assist patient with ADLS  -  Monitor and promote a balance of rest/activity, with adequate nutrition and elimination   Outcome: Progressing     Problem: Anxiety  Goal: Anxiety is at manageable level  Description: Interventions:  - Assess and monitor patient's anxiety level.   - Monitor for signs and symptoms (heart palpitations, chest pain, shortness of breath, headaches, nausea, feeling jumpy, restlessness, irritable, apprehensive).   - Collaborate with interdisciplinary team and initiate plan and interventions as ordered.  - Gamaliel patient to unit/surroundings  - Explain treatment plan  - Encourage participation in care  - Encourage verbalization of concerns/fears  - Identify coping mechanisms  - Assist in developing anxiety-reducing skills  - Administer/offer alternative therapies  - Limit or eliminate stimulants  Outcome: Progressing     Problem: DISCHARGE PLANNING - CARE MANAGEMENT  Goal: Discharge to post-acute care or home with appropriate resources  Description: INTERVENTIONS:  - Conduct assessment to determine patient/family and health care team treatment goals, and need for post-acute services based on payer coverage, community resources, and patient preferences, and barriers to discharge  - Address psychosocial, clinical, and financial barriers to discharge as identified in assessment in conjunction with the patient/family and health care team  - Arrange appropriate level of post-acute services according to patient’s   needs and preference and payer coverage in collaboration with the physician and health care team  - Communicate with and update the patient/family, physician, and health care team regarding progress on the discharge plan  - Arrange appropriate transportation to post-acute venues  Outcome: Progressing     Problem: Knowledge Deficit  Goal: Patient/family/caregiver demonstrates understanding of disease process, treatment plan, medications, and discharge  instructions  Description: Complete learning assessment and assess knowledge base.  Interventions:  - Provide teaching at level of understanding  - Provide teaching via preferred learning methods  Outcome: Progressing     Problem: SLEEP DISTURBANCE  Goal: Will exhibit normal sleeping pattern  Description: Interventions:  -  Assess the patients sleep pattern, noting recent changes  - Administer medication as ordered  - Decrease environmental stimuli, including noise, as appropriate during the night  - Encourage the patient to actively participate in unit groups and or exercise during the day to enhance ability to achieve adequate sleep at night  - Assess the patient, in the morning, encouraging a description of sleep experience  Outcome: Progressing

## 2024-08-12 NOTE — NURSING NOTE
Visible in the milieu at the start of the shift and for breakfast but became seclusive to room as shift went along. Pleasant and cooperative during interaction. Compliant with medications. Denies symptoms. Waiting placement.

## 2024-08-12 NOTE — NURSING NOTE
Patient is in the community, but remains along the periphery. Patient is quiet, calm and cooperative. Patient brightens on approach and engages in conversation about the day and TV. Patient denies all psych symptoms at this time. Q7 observation maintained.

## 2024-08-13 PROCEDURE — 99232 SBSQ HOSP IP/OBS MODERATE 35: CPT

## 2024-08-13 RX ADMIN — FAMOTIDINE 20 MG: 20 TABLET ORAL at 08:16

## 2024-08-13 RX ADMIN — ATORVASTATIN CALCIUM 10 MG: 10 TABLET, FILM COATED ORAL at 17:19

## 2024-08-13 RX ADMIN — CYANOCOBALAMIN TAB 1000 MCG 1000 MCG: 1000 TAB at 08:16

## 2024-08-13 RX ADMIN — ARIPIPRAZOLE 5 MG: 5 TABLET ORAL at 08:16

## 2024-08-13 RX ADMIN — CHOLECALCIFEROL TAB 25 MCG (1000 UNIT) 2000 UNITS: 25 TAB at 08:16

## 2024-08-13 RX ADMIN — SERTRALINE HYDROCHLORIDE 150 MG: 100 TABLET ORAL at 08:16

## 2024-08-13 RX ADMIN — LEVOTHYROXINE SODIUM 37.5 MCG: 125 TABLET ORAL at 06:13

## 2024-08-13 RX ADMIN — FAMOTIDINE 20 MG: 20 TABLET ORAL at 17:19

## 2024-08-13 NOTE — TREATMENT PLAN
TREATMENT PLAN REVIEW - Behavioral Health Franco Roberson 39 y.o. 1985 male MRN: [id number]    [facility] 3B BEHAVIORAL [facility] Room / Bed: [facility] 345/[facility] 345-02 Encounter: [id number]          Admit Date/Time:  5/14/2024  4:45 PM    Treatment Team:   MD Isaac Biggs RN Megan Davis Stephanie L Ditmer, RN Lisbeth Tatiana Lugo-Castillo Sean Washburn    Diagnosis: Principal Problem:    MDD (major depressive disorder), recurrent severe, without psychosis (HCC)  Active Problems:    Unspecified depressive disorder (HCC)    Medical clearance for psychiatric admission    Hyperlipidemia    Vitamin D deficiency    B12 deficiency    Hypothyroidism    Rule Out Autism spectrum disorder      Patient Strengths/Assets: ability for insight, cooperative, communication skills, negotiates basic needs, patient is on a voluntary commitment    Patient Barriers/Limitations: difficulty adapting, lack of stable employment, poor self-care, poor support system    Short Term Goals: decrease in depressive symptoms, decrease in anxiety symptoms, ability to stay safe on the unit, ability to stay free of restraints, improvement in insight, improvement in self care, acceptance of need for psychiatric treatment, acceptance of psychiatric medications    Long Term Goals: improvement in depression, improvement in anxiety, resolution of depressive symptoms, free of suicidal thoughts, acceptance of need for psychiatric medications, acceptance of need for psychiatric treatment, acceptance of need for psychiatric follow up after discharge, adequate self care, stable living arrangements upon discharge    Progress Towards Goals: continue psychiatric medications as prescribed    Recommended Treatment: medication management, patient medication education, group therapy, milieu therapy, continued Behavioral Health psychiatric evaluation/assessment process    Treatment Frequency:  daily medication monitoring, group and milieu therapy daily, monitoring through interdisciplinary rounds, monitoring through weekly patient care conferences    Expected Discharge Date:  TBD; placement pending    Discharge Plan: placement in alternative living arrangement, referral for outpatient medication management with a psychiatrist, referral for outpatient psychotherapy, referrals as indicated    Treatment Plan Created/Updated By: Girma Barker MD

## 2024-08-13 NOTE — NURSING NOTE
Patient has been visible in the milieu most of the morning. Attending groups. Denies symptoms. Compliant with medications. Waiting placement.

## 2024-08-13 NOTE — PROGRESS NOTES
08/13/24 0886   Team Meeting   Meeting Type Daily Rounds   Team Members Present   Team Members Present Physician;Nurse;   Physician Team Member Vijay   Nursing Team Member Tamia   Care Management Team Member Suhas   Patient/Family Present   Patient Present No   Patient's Family Present No     Pt med/meal compliant. Visible on the unit, social with select peers. Pleasant, calm, cooperative. Discharge pending placement.

## 2024-08-13 NOTE — NURSING NOTE
"Pt visible, social, cooperative, but reports slightly increased anxiety this evening. Pt stated \"There looking in [facility] now,  I'm not familiar with it.\"  Pt also added \"I've been here a long time and I finally feel like I'm mentally ready to go, I'm starting to get a little anxious\".  Pt reminded of his progress and  reassured of an inevitable placement.  Pt remains in behavioral control, denies SI/HI//AVH.  Pt compliant with scheduled evening medications.    "

## 2024-08-13 NOTE — PROGRESS NOTES
"Progress Note - Behavioral Health   Franco Roberson 39 y.o. male MRN: [id number]  Unit/Bed#: [facility] 345-02 Encounter: [id number]      Assessment & Plan   Principal Problem:    MDD (major depressive disorder), recurrent severe, without psychosis (HCC)  Active Problems:    Unspecified depressive disorder (HCC)    Medical clearance for psychiatric admission    Hyperlipidemia    Vitamin D deficiency    B12 deficiency    Hypothyroidism    Rule Out Autism spectrum disorder      Subjective:  Patient was seen today for continuation of care, records reviewed and patient was discussed with the morning case review team.  Per staff, Franco remains meal and medication compliant.  No acute behavioral issues.     Franco was seen today for psychiatric follow-up.  On assessment today, Franco was seen in the hallway away from peers.  He reports his mood is \"good\" today.  He presents as euthymic today.  He reports that he has been sleeping well at night and his appetite remains good as well.  He has been attending groups and is social with select peers on the unit.  He reports that he is showering every other day.  He denies any auditory or visual hallucinations.  He does not present as manic or internally preoccupied.  He has been compliant with his medication and denies any side effects.  He continues to await placement.     Recommended Treatment: Treatment plan and medication changes discussed and per the attending physician the plan is:    1.Continue with group therapy, milieu therapy and occupational therapy  2.Behavioral Health checks every 7 minutes  3.Continue frequent safety checks and vitals per unit protocol  4.Continue with SLIM medical management as indicated  5.Continue with current medication regimen: Zoloft 150mg PO daily and Abilify 5mg PO daily.  Franco is awaiting placement   6.Will review labs in the a.m.  7.Disposition Planning: Discharge planning and efforts remain ongoing    Vitals:  Vitals:    08/13/24 0727   BP: " 117/65   Pulse: 60   Resp: 16   Temp: 98.5 °F (36.9 °C)   SpO2: 98%       Laboratory Results:  I have personally reviewed all pertinent laboratory/tests results.  Most Recent Labs:   Lab Results   Component Value Date    WBC 7.01 06/27/2024    RBC 4.54 06/27/2024    HGB 14.5 06/27/2024    HCT 44.4 06/27/2024     06/27/2024    RDW 12.0 06/27/2024    NEUTROABS 4.01 06/27/2024    SODIUM 139 07/10/2024    K 3.8 07/10/2024     07/10/2024    CO2 27 07/10/2024    BUN 16 07/10/2024    CREATININE 0.92 07/10/2024    GLUC 87 07/10/2024    GLUF 87 07/10/2024    CALCIUM 9.3 07/10/2024    AST 28 07/10/2024    ALT 36 07/10/2024    ALKPHOS 66 07/10/2024    TP 7.7 07/10/2024    ALB 4.4 07/10/2024    TBILI 0.55 07/10/2024    CHOLESTEROL 159 08/09/2024    HDL 54 08/09/2024    TRIG 88 08/09/2024    LDLCALC 87 08/09/2024    NONHDLC 105 08/09/2024    OBM8ADQDHBJE 5.705 (H) 07/10/2024    FREET4 0.54 (L) 07/10/2024       Psychiatric Review of Systems:  Behavior over the last 24 hours:  unchanged.   Sleep: adequate  Appetite: adequate  Medication side effects: denies  ROS: no complaints, denies shortness of breath or chest pain and all other systems are negative for acute changes    Mental Status Evaluation:    Appearance:  casually dressed, looks stated age   Behavior:  pleasant, cooperative, calm   Speech:  normal rate and volume   Mood:  euthymic   Affect:  mood-congruent   Thought Process:  goal directed, linear   Associations: intact associations   Thought Content:  no overt delusions   Perceptual Disturbances: none   Risk Potential: Suicidal ideation - None  Homicidal ideation - None  Potential for aggression - Not at present   Sensorium:  oriented to person, place, time/date, and situation   Memory:  recent memory intact   Consciousness:  alert and awake   Attention/Concentration: attention span and concentration are age appropriate   Insight:  improving   Judgment: improving   Gait/Station: normal gait/station   Motor  Activity: no abnormal movements     Progress Toward Goals:   This patient is stable and appears to be at baseline, however, requires ongoing hospitalization due to continued poor insight, judgement, and coping that poses a risk to patient in an unsupervised setting. Without medication management in place, patient is likely to decompensate rapidly and become a risk of danger to self or others.      Risk of Harm to Self:   Nursing Suicide Risk Assessment Last 24 hours: C-SSRS Risk (Since Last Contact)  Calculated C-SSRS Risk Score (Since Last Contact): No Risk Indicated  Current Specific Risk Factors include: diagnosis of mood disorder  Protective Factors: no current suicidal ideation, ability to communicate with staff on the unit, able to contract for safety on the unit, taking medications as ordered on the unit, ability to make plans for the future, improved mood, responds to redirection  Based on today's assessment, Franco presents the following risk of harm to self: low    Risk of Harm to Others:  Nursing Homicide Risk Assessment: Violence Risk to Others: Denies within past 6 months  Current Specific Risk Factors include: diagnosis of mood disorder  Protective Factors: no current homicidal ideation, improved mood, no current psychotic symptoms, compliant with medications on the unit as ordered, compliant with unit milieu, follows staff redirection  Based on today's assessment, Franco presents the following risk of harm to others: low    The following interventions are recommended: behavioral checks every 7 minutes, continued hospitalization on locked unit        Behavioral Health Medications: all current active meds have been reviewed and continue current psychiatric medications.  Current Facility-Administered Medications   Medication Dose Route Frequency Provider Last Rate    acetaminophen  650 mg Oral Q6H PRN Basilio Brown MD      acetaminophen  650 mg Oral Q4H PRN Basilio Brown MD      acetaminophen   975 mg Oral Q6H PRN Basilio Brown MD      aluminum-magnesium hydroxide-simethicone  30 mL Oral Q4H PRN Basilio Brown MD      ARIPiprazole  5 mg Oral Daily Basilio Panda MD      Artificial Tears  1 drop Both Eyes Q3H PRN Basilio Brown MD      atorvastatin  10 mg Oral Daily With Dinner WALLY Baptiste      benztropine  1 mg Intramuscular Q4H PRN Max 6/day Basilio Brown MD      benztropine  1 mg Oral Q4H PRN Max 6/day Basilio Brown MD      Cholecalciferol  2,000 Units Oral Daily WALLY Baptiste      cyanocobalamin  1,000 mcg Oral Daily WALLY Baptiste      Diclofenac Sodium  2 g Topical 4x Daily PRN WALLY Baptiste      hydrOXYzine HCL  50 mg Oral Q6H PRN Max 4/day Basilio Brown MD      Or    diphenhydrAMINE  50 mg Intramuscular Q6H PRN Basilio Brown MD      famotidine  20 mg Oral BID WALLY Baptiste      hydrOXYzine HCL  100 mg Oral Q6H PRN Max 4/day Basilio Brown MD      Or    LORazepam  2 mg Intramuscular Q6H PRN Basilio Brown MD      hydrOXYzine HCL  25 mg Oral Q6H PRN Max 4/day Basilio Brown MD      levothyroxine  37.5 mcg Oral Early Morning WALLY Baptiste      melatonin  6 mg Oral HS PRN WALLY Gomez      methocarbamol  500 mg Oral Q6H PRN WALLY Baptiste      OLANZapine  5 mg Oral Q4H PRN Max 3/day Basilio Brown MD      Or    OLANZapine  2.5 mg Intramuscular Q4H PRN Max 3/day Basilio Brown MD      OLANZapine  5 mg Oral Q3H PRN Max 3/day Basilio Brown MD      Or    OLANZapine  5 mg Intramuscular Q3H PRN Max 3/day Basilio Brown MD      OLANZapine  2.5 mg Oral Q4H PRN Max 6/day Basilio Brown MD      polyethylene glycol  17 g Oral Daily PRN Basilio Brown MD      propranolol  10 mg Oral Q8H PRN Basilio Brown MD      senna-docusate sodium  1 tablet Oral Daily PRN Basilio Brown MD      sertraline   150 mg Oral Daily WALLY Gomez      traZODone  50 mg Oral HS PRN Basilio Brown MD         Risks / Benefits of Treatment:  Risks, benefits, and possible side effects of medications explained to patient and patient verbalizes understanding and agreement for treatment.    Counseling / Coordination of Care:  Patient's progress reviewed with nursing staff.  Medications, treatment progress and treatment plan reviewed with patient.  Supportive counseling provided to the patient.    Total floor/unit time spent today 25 minutes. Greater than 50% of total time was spent with the patient and / or family counseling and / or coordination of care. A description of the counseling / coordination of care: medication education, treatment plan, supportive therapy.

## 2024-08-14 PROCEDURE — 99232 SBSQ HOSP IP/OBS MODERATE 35: CPT

## 2024-08-14 RX ADMIN — SERTRALINE HYDROCHLORIDE 150 MG: 100 TABLET ORAL at 08:12

## 2024-08-14 RX ADMIN — ARIPIPRAZOLE 5 MG: 5 TABLET ORAL at 08:12

## 2024-08-14 RX ADMIN — FAMOTIDINE 20 MG: 20 TABLET ORAL at 17:11

## 2024-08-14 RX ADMIN — CYANOCOBALAMIN TAB 1000 MCG 1000 MCG: 1000 TAB at 08:12

## 2024-08-14 RX ADMIN — CHOLECALCIFEROL TAB 25 MCG (1000 UNIT) 2000 UNITS: 25 TAB at 08:12

## 2024-08-14 RX ADMIN — ATORVASTATIN CALCIUM 10 MG: 10 TABLET, FILM COATED ORAL at 17:11

## 2024-08-14 RX ADMIN — FAMOTIDINE 20 MG: 20 TABLET ORAL at 08:12

## 2024-08-14 RX ADMIN — LEVOTHYROXINE SODIUM 37.5 MCG: 125 TABLET ORAL at 06:37

## 2024-08-14 NOTE — PLAN OF CARE
Problem: Ineffective Coping  Goal: Identifies ineffective coping skills  Outcome: Progressing  Goal: Identifies healthy coping skills  Outcome: Progressing  Goal: Demonstrates healthy coping skills  Outcome: Progressing  Goal: Participates in unit activities  Description: Interventions:  - Provide therapeutic environment   - Provide required programming   - Redirect inappropriate behaviors   Outcome: Progressing     Problem: Risk for Self Injury/Neglect  Goal: Treatment Goal: Remain safe during length of stay, learn and adopt new coping skills, and be free of self-injurious ideation, impulses and acts at the time of discharge  Outcome: Progressing     Problem: Depression  Goal: Treatment Goal: Demonstrate behavioral control of depressive symptoms, verbalize feelings of improved mood/affect, and adopt new coping skills prior to discharge  Outcome: Progressing  Goal: Verbalize thoughts and feelings  Description: Interventions:  - Assess and re-assess patient's level of risk   - Engage patient in 1:1 interactions, daily, for a minimum of 15 minutes   - Encourage patient to express feelings, fears, frustrations, hopes   Outcome: Progressing  Goal: Refrain from harming self  Description: Interventions:  - Monitor patient closely, per order   - Supervise medication ingestion, monitor effects and side effects   Outcome: Progressing  Goal: Refrain from isolation  Description: Interventions:  - Develop a trusting relationship   - Encourage socialization   Outcome: Progressing  Goal: Refrain from self-neglect  Outcome: Progressing  Goal: Attend and participate in unit activities, including therapeutic, recreational, and educational groups  Description: Interventions:  - Provide therapeutic and educational activities daily, encourage attendance and participation, and document same in the medical record   Outcome: Progressing  Goal: Complete daily ADLs, including personal hygiene independently, as able  Description:  Interventions:  - Observe, teach, and assist patient with ADLS  -  Monitor and promote a balance of rest/activity, with adequate nutrition and elimination   Outcome: Progressing     Problem: Anxiety  Goal: Anxiety is at manageable level  Description: Interventions:  - Assess and monitor patient's anxiety level.   - Monitor for signs and symptoms (heart palpitations, chest pain, shortness of breath, headaches, nausea, feeling jumpy, restlessness, irritable, apprehensive).   - Collaborate with interdisciplinary team and initiate plan and interventions as ordered.  - [place] patient to unit/surroundings  - Explain treatment plan  - Encourage participation in care  - Encourage verbalization of concerns/fears  - Identify coping mechanisms  - Assist in developing anxiety-reducing skills  - Administer/offer alternative therapies  - Limit or eliminate stimulants  Outcome: Progressing     Problem: Knowledge Deficit  Goal: Patient/family/caregiver demonstrates understanding of disease process, treatment plan, medications, and discharge instructions  Description: Complete learning assessment and assess knowledge base.  Interventions:  - Provide teaching at level of understanding  - Provide teaching via preferred learning methods  Outcome: Progressing     Problem: SLEEP DISTURBANCE  Goal: Will exhibit normal sleeping pattern  Description: Interventions:  -  Assess the patients sleep pattern, noting recent changes  - Administer medication as ordered  - Decrease environmental stimuli, including noise, as appropriate during the night  - Encourage the patient to actively participate in unit groups and or exercise during the day to enhance ability to achieve adequate sleep at night  - Assess the patient, in the morning, encouraging a description of sleep experience  Outcome: Progressing

## 2024-08-14 NOTE — NURSING NOTE
"Pleasant, calm and cooperative during interaction. Denies symptoms. Reports sleeping well. \" It feels good to finally be able to sleep\". Compliant with medications. Waiting placement.   "

## 2024-08-14 NOTE — PROGRESS NOTES
"Progress Note - Behavioral Health   Franco Roberson 39 y.o. male MRN: [id number]  Unit/Bed#: [facility] 345-02 Encounter: [id number]      Assessment & Plan   Principal Problem:    MDD (major depressive disorder), recurrent severe, without psychosis (HCC)  Active Problems:    Unspecified depressive disorder (HCC)    Medical clearance for psychiatric admission    Hyperlipidemia    Vitamin D deficiency    B12 deficiency    Hypothyroidism    Rule Out Autism spectrum disorder      Subjective:  Patient was seen today for continuation of care, records reviewed and patient was discussed with the morning case review team.  Per staff, Franco remains meal and medication compliant.  He is attending groups and participating.    Franco was seen today for psychiatric follow-up.  On assessment today, Franco was seen in the hallway away from peers.  He reports his mood is \"good\".  He presents as euthymic with a constricted affect.   He continues to  report adequate sleep  and appetite.  He has been showering every other day.  Grooming has improved somewhat.   He denies any auditory or visual hallucinations.  He does not present as manic or internally preoccupied.  He has been compliant with his medication and denies any side effects.  He continues to await placement.  She denies any suicidal and homicidal ideation/plan or intent.     Recommended Treatment: Treatment plan and medication changes discussed and per the attending physician the plan is:    1.Continue with group therapy, milieu therapy and occupational therapy  2.Behavioral Health checks every 7 minutes  3.Continue frequent safety checks and vitals per unit protocol  4.Continue with SLIM medical management as indicated  5.Continue with current medication regimen: Zoloft 150mg PO daily and Abilify 5mg PO daily.  Franco is awaiting placement   6.Will review labs in the a.m.  7.Disposition Planning: Discharge planning and efforts remain ongoing    Vitals:  Vitals:    08/14/24 0734   BP: " 124/68   Pulse: 67   Resp: 16   Temp: 97.5 °F (36.4 °C)   SpO2: 98%       Laboratory Results:  I have personally reviewed all pertinent laboratory/tests results.  Most Recent Labs:   Lab Results   Component Value Date    WBC 7.01 06/27/2024    RBC 4.54 06/27/2024    HGB 14.5 06/27/2024    HCT 44.4 06/27/2024     06/27/2024    RDW 12.0 06/27/2024    NEUTROABS 4.01 06/27/2024    SODIUM 139 07/10/2024    K 3.8 07/10/2024     07/10/2024    CO2 27 07/10/2024    BUN 16 07/10/2024    CREATININE 0.92 07/10/2024    GLUC 87 07/10/2024    GLUF 87 07/10/2024    CALCIUM 9.3 07/10/2024    AST 28 07/10/2024    ALT 36 07/10/2024    ALKPHOS 66 07/10/2024    TP 7.7 07/10/2024    ALB 4.4 07/10/2024    TBILI 0.55 07/10/2024    CHOLESTEROL 159 08/09/2024    HDL 54 08/09/2024    TRIG 88 08/09/2024    LDLCALC 87 08/09/2024    NONHDLC 105 08/09/2024    [id number] 5.705 (H) 07/10/2024    FREET4 0.54 (L) 07/10/2024       Psychiatric Review of Systems:  Behavior over the last 24 hours:  unchanged.   Sleep: adequate  Appetite: adequate  Medication side effects: denies  ROS:   no complaints, denies shortness of breath or chest pain and all other systems are negative for acute changes    Mental Status Evaluation:    Appearance:  casually dressed, improved grooming, looks stated age   Behavior:  pleasant, cooperative, calm   Speech:  normal rate and volume   Mood:  euthymic   Affect:  mood-congruent   Thought Process:  goal directed, linear   Associations: intact associations   Thought Content:  no overt delusions   Perceptual Disturbances: none   Risk Potential: Suicidal ideation - None  Homicidal ideation - None  Potential for aggression - Not at present   Sensorium:  oriented to person, place, time/date, and situation   Memory:  recent memory intact   Consciousness:  alert and awake   Attention/Concentration: attention span and concentration are age appropriate   Insight:  improving   Judgment: improving   Gait/Station: normal  gait/station   Motor Activity: no abnormal movements     Progress Toward Goals:   This patient is stable and appears to be at baseline, however, requires ongoing hospitalization due to continued poor insight, judgement, and coping that poses a risk to patient in an unsupervised setting. Without medication management in place, patient is likely to decompensate rapidly and become a risk of danger to self or others.      Risk of Harm to Self:   Nursing Suicide Risk Assessment Last 24 hours: C-SSRS Risk (Since Last Contact)  Calculated C-SSRS Risk Score (Since Last Contact): No Risk Indicated  Current Specific Risk Factors include: diagnosis of mood disorder  Protective Factors: no current suicidal ideation, ability to communicate with staff on the unit, able to contract for safety on the unit, taking medications as ordered on the unit, ability to make plans for the future, improved mood, responds to redirection  Based on today's assessment, Franco presents the following risk of harm to self: low    Risk of Harm to Others:  Nursing Homicide Risk Assessment: Violence Risk to Others: Denies within past 6 months  Current Specific Risk Factors include: diagnosis of mood disorder  Protective Factors: no current homicidal ideation, improved mood, no current psychotic symptoms, compliant with medications on the unit as ordered, compliant with unit milieu, follows staff redirection  Based on today's assessment, Franco presents the following risk of harm to others: low    The following interventions are recommended: behavioral checks every 7 minutes, continued hospitalization on locked unit        Behavioral Health Medications: all current active meds have been reviewed and continue current psychiatric medications.  Current Facility-Administered Medications   Medication Dose Route Frequency Provider Last Rate    acetaminophen  650 mg Oral Q6H PRN Basilio Brown MD      acetaminophen  650 mg Oral Q4H PRN Basilio Brown  MD      acetaminophen  975 mg Oral Q6H PRN Basilio Brown MD      aluminum-magnesium hydroxide-simethicone  30 mL Oral Q4H PRN Basilio Brown MD      ARIPiprazole  5 mg Oral Daily Basilio Panda MD      Artificial Tears  1 drop Both Eyes Q3H PRN Basilio Brown MD      atorvastatin  10 mg Oral Daily With Dinner WALLY Baptiste      benztropine  1 mg Intramuscular Q4H PRN Max 6/day Basilio Brown MD      benztropine  1 mg Oral Q4H PRN Max 6/day Basilio Brown MD      Cholecalciferol  2,000 Units Oral Daily WALLY Baptiste      cyanocobalamin  1,000 mcg Oral Daily WALLY Baptiste      Diclofenac Sodium  2 g Topical 4x Daily PRN WALLY Baptiste      hydrOXYzine HCL  50 mg Oral Q6H PRN Max 4/day Basilio Brown MD      Or    diphenhydrAMINE  50 mg Intramuscular Q6H PRN Basilio Brown MD      famotidine  20 mg Oral BID WALLY Baptiste      hydrOXYzine HCL  100 mg Oral Q6H PRN Max 4/day Basilio Brown MD      Or    LORazepam  2 mg Intramuscular Q6H PRN Basilio Brown MD      hydrOXYzine HCL  25 mg Oral Q6H PRN Max 4/day Basilio Brown MD      levothyroxine  37.5 mcg Oral Early Morning WALLY Baptiste      melatonin  6 mg Oral HS PRN WALLY Gomez      methocarbamol  500 mg Oral Q6H PRN WALLY Baptiste      OLANZapine  5 mg Oral Q4H PRN Max 3/day Basilio Brown MD      Or    OLANZapine  2.5 mg Intramuscular Q4H PRN Max 3/day Basilio Brown MD      OLANZapine  5 mg Oral Q3H PRN Max 3/day Basilio Brown MD      Or    OLANZapine  5 mg Intramuscular Q3H PRN Max 3/day Basilio Brown MD      OLANZapine  2.5 mg Oral Q4H PRN Max 6/day Basilio Brown MD      polyethylene glycol  17 g Oral Daily PRN Basilio Brown MD      propranolol  10 mg Oral Q8H PRN Basilio Brown MD      senna-docusate sodium  1 tablet Oral Daily PRN Basilio Brown,  MD      sertraline  150 mg Oral Daily WALLY Gomez      traZODone  50 mg Oral HS PRN Basilio Brown MD         Risks / Benefits of Treatment:  Risks, benefits, and possible side effects of medications explained to patient and patient verbalizes understanding and agreement for treatment.    Counseling / Coordination of Care:  Patient's progress reviewed with nursing staff.  Medications, treatment progress and treatment plan reviewed with patient.  Supportive counseling provided to the patient.    Total floor/unit time spent today 25 minutes. Greater than 50% of total time was spent with the patient and / or family counseling and / or coordination of care. A description of the counseling / coordination of care: medication education, treatment plan, supportive therapy.

## 2024-08-14 NOTE — PROGRESS NOTES
08/14/24 0856   Team Meeting   Meeting Type Daily Rounds   Team Members Present   Team Members Present Physician;;Nurse   Physician Team Member Gisela   [facility]   Patient/Family Present   Patient Present No   Patient's Family Present No     Pt denies SI/HI/AVH. Pt is medication and meal compliant. Pt is awaiting placement. Pt reported he was able to sleep last night.

## 2024-08-15 PROCEDURE — 99232 SBSQ HOSP IP/OBS MODERATE 35: CPT

## 2024-08-15 RX ADMIN — ATORVASTATIN CALCIUM 10 MG: 10 TABLET, FILM COATED ORAL at 16:45

## 2024-08-15 RX ADMIN — FAMOTIDINE 20 MG: 20 TABLET ORAL at 17:43

## 2024-08-15 RX ADMIN — CHOLECALCIFEROL TAB 25 MCG (1000 UNIT) 2000 UNITS: 25 TAB at 08:29

## 2024-08-15 RX ADMIN — FAMOTIDINE 20 MG: 20 TABLET ORAL at 08:29

## 2024-08-15 RX ADMIN — LEVOTHYROXINE SODIUM 37.5 MCG: 125 TABLET ORAL at 06:39

## 2024-08-15 RX ADMIN — CYANOCOBALAMIN TAB 1000 MCG 1000 MCG: 1000 TAB at 08:29

## 2024-08-15 RX ADMIN — ARIPIPRAZOLE 5 MG: 5 TABLET ORAL at 08:29

## 2024-08-15 RX ADMIN — SERTRALINE HYDROCHLORIDE 150 MG: 100 TABLET ORAL at 08:29

## 2024-08-15 NOTE — PLAN OF CARE
Problem: Ineffective Coping  Goal: Identifies ineffective coping skills  Outcome: Progressing  Goal: Identifies healthy coping skills  Outcome: Progressing  Goal: Demonstrates healthy coping skills  Outcome: Progressing  Goal: Participates in unit activities  Description: Interventions:  - Provide therapeutic environment   - Provide required programming   - Redirect inappropriate behaviors   Outcome: Progressing     Problem: Risk for Self Injury/Neglect  Goal: Treatment Goal: Remain safe during length of stay, learn and adopt new coping skills, and be free of self-injurious ideation, impulses and acts at the time of discharge  Outcome: Progressing     Problem: Depression  Goal: Treatment Goal: Demonstrate behavioral control of depressive symptoms, verbalize feelings of improved mood/affect, and adopt new coping skills prior to discharge  Outcome: Progressing  Goal: Verbalize thoughts and feelings  Description: Interventions:  - Assess and re-assess patient's level of risk   - Engage patient in 1:1 interactions, daily, for a minimum of 15 minutes   - Encourage patient to express feelings, fears, frustrations, hopes   Outcome: Progressing  Goal: Refrain from harming self  Description: Interventions:  - Monitor patient closely, per order   - Supervise medication ingestion, monitor effects and side effects   Outcome: Progressing  Goal: Refrain from isolation  Description: Interventions:  - Develop a trusting relationship   - Encourage socialization   Outcome: Progressing  Goal: Refrain from self-neglect  Outcome: Progressing  Goal: Attend and participate in unit activities, including therapeutic, recreational, and educational groups  Description: Interventions:  - Provide therapeutic and educational activities daily, encourage attendance and participation, and document same in the medical record   Outcome: Progressing  Goal: Complete daily ADLs, including personal hygiene independently, as able  Description:  Interventions:  - Observe, teach, and assist patient with ADLS  -  Monitor and promote a balance of rest/activity, with adequate nutrition and elimination   Outcome: Progressing     Problem: Anxiety  Goal: Anxiety is at manageable level  Description: Interventions:  - Assess and monitor patient's anxiety level.   - Monitor for signs and symptoms (heart palpitations, chest pain, shortness of breath, headaches, nausea, feeling jumpy, restlessness, irritable, apprehensive).   - Collaborate with interdisciplinary team and initiate plan and interventions as ordered.  - [place] patient to unit/surroundings  - Explain treatment plan  - Encourage participation in care  - Encourage verbalization of concerns/fears  - Identify coping mechanisms  - Assist in developing anxiety-reducing skills  - Administer/offer alternative therapies  - Limit or eliminate stimulants  Outcome: Progressing     Problem: Knowledge Deficit  Goal: Patient/family/caregiver demonstrates understanding of disease process, treatment plan, medications, and discharge instructions  Description: Complete learning assessment and assess knowledge base.  Interventions:  - Provide teaching at level of understanding  - Provide teaching via preferred learning methods  Outcome: Progressing     Problem: SLEEP DISTURBANCE  Goal: Will exhibit normal sleeping pattern  Description: Interventions:  -  Assess the patients sleep pattern, noting recent changes  - Administer medication as ordered  - Decrease environmental stimuli, including noise, as appropriate during the night  - Encourage the patient to actively participate in unit groups and or exercise during the day to enhance ability to achieve adequate sleep at night  - Assess the patient, in the morning, encouraging a description of sleep experience  Outcome: Progressing

## 2024-08-15 NOTE — PROGRESS NOTES
08/15/24 0841   Team Meeting   Meeting Type Daily Rounds   Team Members Present   Team Members Present Physician;;Nurse   Physician Team Member Gisela   Nursing Team Member [facility] Management Team Member Noa   Patient/Family Present   Patient Present No   Patient's Family Present No     Pt was observed in his room. Pt is medication and meal compliant. Pt denies SI/HI/AVH. Pt's discharge is pending placement.

## 2024-08-15 NOTE — NURSING NOTE
Patient is calm and cooperative upon approach. Patient is visible on unit, isolative to self. Patient denies SI/HI/AH/VH. Patient is compliant with meds and meals.

## 2024-08-15 NOTE — NURSING NOTE
Patient noted to be secluded mainly to room this shift ,  out for meals and vitals. Patient appears depressed and withdrawn although denies depression SI/HI/AH/VH at this time.Compliant with medications and routine vitals.

## 2024-08-15 NOTE — PROGRESS NOTES
Progress Note - Behavioral Health   Franco Roberson 39 y.o. male MRN: [id number]  Unit/Bed#: [facility] 345-02 Encounter: [id number]      Assessment & Plan   Principal Problem:    MDD (major depressive disorder), recurrent severe, without psychosis (HCC)  Active Problems:    Unspecified depressive disorder (HCC)    Medical clearance for psychiatric admission    Hyperlipidemia    Vitamin D deficiency    B12 deficiency    Hypothyroidism    Rule Out Autism spectrum disorder      Subjective:  Patient was seen today for continuation of care, records reviewed and patient was discussed with the morning case review team.  Per staff, Franco remains visible at times of the unit.  He is meal and medication compliant.      Franco was seen today for psychiatric follow-up.  On assessment today, Franco was seen in the hallway away from peers.  He presents as euthymic today.  He reports some mild pain in his left shoulder.  He reports that he started to do some exercises like squats and push ups in his room.  He believes the shoulder pain is from push ups so he will hold off doing those for a few days.  He states he will report if pain increases.  Otherwise he has no complaints.  He has been sleeping well and his appetite is good as well.   He denies any auditory or visual hallucinations.  He does not present as manic or internally preoccupied.  He has been compliant with his medication and denies any side effects.  He continues to await placement.  She denies any suicidal and homicidal ideation/plan or intent.     Recommended Treatment: Treatment plan and medication changes discussed and per the attending physician the plan is:    1.Continue with group therapy, milieu therapy and occupational therapy  2.Behavioral Health checks every 7 minutes  3.Continue frequent safety checks and vitals per unit protocol  4.Continue with SLIM medical management as indicated  5.Continue with current medication regimen: Zoloft 150mg PO daily and Abilify 5mg  PO daily.  Franco is awaiting placement   6.Will review labs in the a.m.  7.Disposition Planning: Discharge planning and efforts remain ongoing    Vitals:  Vitals:    08/15/24 0724   BP: 126/59   Pulse: 60   Resp:    Temp: (!) 97 °F (36.1 °C)   SpO2: 98%       Laboratory Results:  I have personally reviewed all pertinent laboratory/tests results.  Most Recent Labs:   Lab Results   Component Value Date    WBC 7.01 06/27/2024    RBC 4.54 06/27/2024    HGB 14.5 06/27/2024    HCT 44.4 06/27/2024     06/27/2024    RDW 12.0 06/27/2024    NEUTROABS 4.01 06/27/2024    SODIUM 139 07/10/2024    K 3.8 07/10/2024     07/10/2024    CO2 27 07/10/2024    BUN 16 07/10/2024    CREATININE 0.92 07/10/2024    GLUC 87 07/10/2024    GLUF 87 07/10/2024    CALCIUM 9.3 07/10/2024    AST 28 07/10/2024    ALT 36 07/10/2024    ALKPHOS 66 07/10/2024    TP 7.7 07/10/2024    ALB 4.4 07/10/2024    TBILI 0.55 07/10/2024    CHOLESTEROL 159 08/09/2024    HDL 54 08/09/2024    TRIG 88 08/09/2024    LDLCALC 87 08/09/2024    NONHDLC 105 08/09/2024    [id number] 5.705 (H) 07/10/2024    FREET4 0.54 (L) 07/10/2024       Psychiatric Review of Systems:  Behavior over the last 24 hours:  unchanged.   Sleep: adequate  Appetite: adequate  Medication side effects: denies  ROS:  left shoulder pain , denies shortness of breath or chest pain and all other systems are negative for acute changes    Mental Status Evaluation:    Appearance:  casually dressed, adequate grooming, looks stated age   Behavior:  pleasant, cooperative, calm   Speech:  normal rate and volume   Mood:  euthymic   Affect:  constricted   Thought Process:  goal directed, linear   Associations: intact associations   Thought Content:  no overt delusions   Perceptual Disturbances: none   Risk Potential: Suicidal ideation - None  Homicidal ideation - None  Potential for aggression - Not at present   Sensorium:  oriented to person, place, time/date, and situation   Memory:  recent memory  intact   Consciousness:  alert and awake   Attention/Concentration: attention span and concentration are age appropriate   Insight:  improving   Judgment: improving   Gait/Station: normal gait/station   Motor Activity: no abnormal movements     Progress Toward Goals:   This patient is stable and appears to be at baseline, however, requires ongoing hospitalization due to continued poor insight, judgement, and coping that poses a risk to patient in an unsupervised setting. Without medication management in place, patient is likely to decompensate rapidly and become a risk of danger to self or others.      Risk of Harm to Self:   Nursing Suicide Risk Assessment Last 24 hours: C-SSRS Risk (Since Last Contact)  Calculated C-SSRS Risk Score (Since Last Contact): No Risk Indicated  Current Specific Risk Factors include: diagnosis of mood disorder  Protective Factors: no current suicidal ideation, ability to communicate with staff on the unit, able to contract for safety on the unit, taking medications as ordered on the unit, ability to make plans for the future, improved mood, responds to redirection  Based on today's assessment, Franco presents the following risk of harm to self: low    Risk of Harm to Others:  Nursing Homicide Risk Assessment: Violence Risk to Others: Denies within past 6 months  Current Specific Risk Factors include: diagnosis of mood disorder  Protective Factors: no current homicidal ideation, improved mood, no current psychotic symptoms, compliant with medications on the unit as ordered, compliant with unit milieu, follows staff redirection  Based on today's assessment, Franco presents the following risk of harm to others: low    The following interventions are recommended: behavioral checks every 7 minutes, continued hospitalization on locked unit        Behavioral Health Medications: all current active meds have been reviewed and continue current psychiatric medications.  Current  Facility-Administered Medications   Medication Dose Route Frequency Provider Last Rate    acetaminophen  650 mg Oral Q6H PRN Basilio Brown MD      acetaminophen  650 mg Oral Q4H PRN Basilio Brown MD      acetaminophen  975 mg Oral Q6H PRN Basilio Brown MD      aluminum-magnesium hydroxide-simethicone  30 mL Oral Q4H PRN Basilio Brown MD      ARIPiprazole  5 mg Oral Daily Basilio Panda MD      Artificial Tears  1 drop Both Eyes Q3H PRN Basilio Brown MD      atorvastatin  10 mg Oral Daily With Dinner WALLY Baptiste      benztropine  1 mg Intramuscular Q4H PRN Max 6/day Basilio Brown MD      benztropine  1 mg Oral Q4H PRN Max 6/day Basilio Brown MD      Cholecalciferol  2,000 Units Oral Daily WALLY Baptiste      cyanocobalamin  1,000 mcg Oral Daily WALLY Baptiste      Diclofenac Sodium  2 g Topical 4x Daily PRN WALLY Baptiste      hydrOXYzine HCL  50 mg Oral Q6H PRN Max 4/day Basilio Brown MD      Or    diphenhydrAMINE  50 mg Intramuscular Q6H PRN Basilio Brown MD      famotidine  20 mg Oral BID WALLY Baptiste      hydrOXYzine HCL  100 mg Oral Q6H PRN Max 4/day Basilio Brown MD      Or    LORazepam  2 mg Intramuscular Q6H PRN Basilio Brown MD      hydrOXYzine HCL  25 mg Oral Q6H PRN Max 4/day Basilio Brown MD      levothyroxine  37.5 mcg Oral Early Morning WALLY Baptiste      melatonin  6 mg Oral HS PRN WALLY Gomez      methocarbamol  500 mg Oral Q6H PRN WALLY Baptiste      OLANZapine  5 mg Oral Q4H PRN Max 3/day Basilio Brown MD      Or    OLANZapine  2.5 mg Intramuscular Q4H PRN Max 3/day Basilio Brown MD      OLANZapine  5 mg Oral Q3H PRN Max 3/day Basilio Brown MD      Or    OLANZapine  5 mg Intramuscular Q3H PRN Max 3/day Basilio Brown MD      OLANZapine  2.5 mg Oral Q4H PRN Max 6/day Basilio Garcia  MD Karyn      polyethylene glycol  17 g Oral Daily PRN Basilio Brown MD      propranolol  10 mg Oral Q8H PRN Basilio Brown MD      senna-docusate sodium  1 tablet Oral Daily PRN Basilio Brown MD      sertraline  150 mg Oral Daily WALLY Gomez      traZODone  50 mg Oral HS PRN Basilio Brown MD         Risks / Benefits of Treatment:  Risks, benefits, and possible side effects of medications explained to patient and patient verbalizes understanding and agreement for treatment.    Counseling / Coordination of Care:  Patient's progress reviewed with nursing staff.  Medications, treatment progress and treatment plan reviewed with patient.  Supportive counseling provided to the patient.    Total floor/unit time spent today 25 minutes. Greater than 50% of total time was spent with the patient and / or family counseling and / or coordination of care. A description of the counseling / coordination of care: medication education, treatment plan, supportive therapy.

## 2024-08-16 PROCEDURE — 99232 SBSQ HOSP IP/OBS MODERATE 35: CPT

## 2024-08-16 RX ADMIN — ARIPIPRAZOLE 5 MG: 5 TABLET ORAL at 08:26

## 2024-08-16 RX ADMIN — FAMOTIDINE 20 MG: 20 TABLET ORAL at 17:09

## 2024-08-16 RX ADMIN — FAMOTIDINE 20 MG: 20 TABLET ORAL at 08:26

## 2024-08-16 RX ADMIN — CYANOCOBALAMIN TAB 1000 MCG 1000 MCG: 1000 TAB at 08:26

## 2024-08-16 RX ADMIN — ATORVASTATIN CALCIUM 10 MG: 10 TABLET, FILM COATED ORAL at 16:56

## 2024-08-16 RX ADMIN — CHOLECALCIFEROL TAB 25 MCG (1000 UNIT) 2000 UNITS: 25 TAB at 08:26

## 2024-08-16 RX ADMIN — LEVOTHYROXINE SODIUM 37.5 MCG: 125 TABLET ORAL at 06:46

## 2024-08-16 RX ADMIN — SERTRALINE HYDROCHLORIDE 150 MG: 100 TABLET ORAL at 08:26

## 2024-08-16 NOTE — NURSING NOTE
Patient secluded to room most of the shift , out to walk for short periods of time and for meals. Patient reports sleeping better and discomfort from shoulder sucked all the energy out of him and made him sleepy. Patient denies pain or discomfort at this time. Compliant with medication and  routine vitals. Denies SI/HI/AH/VH at this time.

## 2024-08-16 NOTE — PROGRESS NOTES
"Progress Note - Behavioral Health   Franco Roberson 39 y.o. male MRN: [id number]  Unit/Bed#: [facility] 345-02 Encounter: [id number]      Assessment & Plan   Principal Problem:    MDD (major depressive disorder), recurrent severe, without psychosis (HCC)  Active Problems:    Unspecified depressive disorder (HCC)    Medical clearance for psychiatric admission    Hyperlipidemia    Vitamin D deficiency    B12 deficiency    Hypothyroidism    Rule Out Autism spectrum disorder      Subjective:  Patient was seen today for continuation of care, records reviewed and patient was discussed with the morning case review team.  Per staff, Franco calm, pleasant and cooperative on the unit.  He is compliant with meals and medication.      Franco was seen today for psychiatric follow-up.  On assessment today, Franco was seen resting in bed.  Franco reports that his mood is \"better\" today because his left shoulder pain has improved.  He presents as euthymic today.  He states that he continues to sleep through the night and his appetite is good.  He adamantly denies any suicidal or homicidal ideation/plan or intent.   He denies any auditory or visual hallucinations.  He does not present as manic or internally preoccupied.  He has been compliant with his medication and denies any side effects.  He continues to await placement.        Recommended Treatment: Treatment plan and medication changes discussed and per the attending physician the plan is:    1.Continue with group therapy, milieu therapy and occupational therapy  2.Behavioral Health checks every 7 minutes  3.Continue frequent safety checks and vitals per unit protocol  4.Continue with SLIM medical management as indicated  5.Continue with current medication regimen: Zoloft 150mg PO daily and Abilify 5mg PO daily.  Franco is awaiting placement   6.Will review labs in the a.m.  7.Disposition Planning: Discharge planning and efforts remain ongoing    Vitals:  Vitals:    08/16/24 0718   BP: " 118/72   Pulse: 73   Resp: 16   Temp: (!) 97.4 °F (36.3 °C)   SpO2: 98%       Laboratory Results:  I have personally reviewed all pertinent laboratory/tests results.  Most Recent Labs:   Lab Results   Component Value Date    WBC 7.01 06/27/2024    RBC 4.54 06/27/2024    HGB 14.5 06/27/2024    HCT 44.4 06/27/2024     06/27/2024    RDW 12.0 06/27/2024    NEUTROABS 4.01 06/27/2024    SODIUM 139 07/10/2024    K 3.8 07/10/2024     07/10/2024    CO2 27 07/10/2024    BUN 16 07/10/2024    CREATININE 0.92 07/10/2024    GLUC 87 07/10/2024    GLUF 87 07/10/2024    CALCIUM 9.3 07/10/2024    AST 28 07/10/2024    ALT 36 07/10/2024    ALKPHOS 66 07/10/2024    TP 7.7 07/10/2024    ALB 4.4 07/10/2024    TBILI 0.55 07/10/2024    CHOLESTEROL 159 08/09/2024    HDL 54 08/09/2024    TRIG 88 08/09/2024    LDLCALC 87 08/09/2024    NONHDLC 105 08/09/2024    [id number] 5.705 (H) 07/10/2024    FREET4 0.54 (L) 07/10/2024       Psychiatric Review of Systems:  Behavior over the last 24 hours:  unchanged.   Sleep: adequate  Appetite: adequate  Medication side effects: denies  ROS:  left shoulder pain improving , denies shortness of breath or chest pain and all other systems are negative for acute changes    Mental Status Evaluation:    Appearance:  casually dressed, marginal hygiene   Behavior:  pleasant, cooperative, calm   Speech:  normal rate and volume   Mood:  euthymic   Affect:  constricted, more reactive at times   Thought Process:  goal directed, linear   Associations: intact associations   Thought Content:  no overt delusions   Perceptual Disturbances: none   Risk Potential: Suicidal ideation - None  Homicidal ideation - None  Potential for aggression - Not at present   Sensorium:  oriented to person, place, time/date, and situation   Memory:  recent memory intact   Consciousness:  alert and awake   Attention/Concentration: attention span and concentration are age appropriate   Insight:  improving   Judgment: improving    Gait/Station: normal gait/station   Motor Activity: no abnormal movements     Progress Toward Goals:   This patient is stable and appears to be at baseline, however, requires ongoing hospitalization due to continued poor insight, judgement, and coping that poses a risk to patient in an unsupervised setting. Without medication management in place, patient is likely to decompensate rapidly and become a risk of danger to self or others.      Risk of Harm to Self:   Nursing Suicide Risk Assessment Last 24 hours: C-SSRS Risk (Since Last Contact)  Calculated C-SSRS Risk Score (Since Last Contact): No Risk Indicated  Current Specific Risk Factors include: diagnosis of mood disorder  Protective Factors: no current suicidal ideation, ability to communicate with staff on the unit, able to contract for safety on the unit, taking medications as ordered on the unit, ability to make plans for the future, improved mood, responds to redirection  Based on today's assessment, Franco presents the following risk of harm to self: low    Risk of Harm to Others:  Nursing Homicide Risk Assessment: Violence Risk to Others: Denies within past 6 months  Current Specific Risk Factors include: diagnosis of mood disorder  Protective Factors: no current homicidal ideation, improved mood, no current psychotic symptoms, compliant with medications on the unit as ordered, compliant with unit milieu, follows staff redirection  Based on today's assessment, Franco presents the following risk of harm to others: low    The following interventions are recommended: behavioral checks every 7 minutes, continued hospitalization on locked unit        Behavioral Health Medications: all current active meds have been reviewed and continue current psychiatric medications.  Current Facility-Administered Medications   Medication Dose Route Frequency Provider Last Rate    acetaminophen  650 mg Oral Q6H PRN Basilio Brown MD      acetaminophen  650 mg Oral Q4H  PRN Basilio Brown MD      acetaminophen  975 mg Oral Q6H PRN Basilio Brown MD      aluminum-magnesium hydroxide-simethicone  30 mL Oral Q4H PRN Basilio Brown MD      ARIPiprazole  5 mg Oral Daily Basilio Panda MD      Artificial Tears  1 drop Both Eyes Q3H PRN Basilio Brown MD      atorvastatin  10 mg Oral Daily With Dinner WALLY Baptiste      benztropine  1 mg Intramuscular Q4H PRN Max 6/day Basilio Brown MD      benztropine  1 mg Oral Q4H PRN Max 6/day Basilio Brown MD      Cholecalciferol  2,000 Units Oral Daily WALLY Baptiste      cyanocobalamin  1,000 mcg Oral Daily WALLY Baptiste      Diclofenac Sodium  2 g Topical 4x Daily PRN WALLY Baptiste      hydrOXYzine HCL  50 mg Oral Q6H PRN Max 4/day Basilio Brown MD      Or    diphenhydrAMINE  50 mg Intramuscular Q6H PRN Basilio Brown MD      famotidine  20 mg Oral BID WALLY Baptiste      hydrOXYzine HCL  100 mg Oral Q6H PRN Max 4/day Basilio Brown MD      Or    LORazepam  2 mg Intramuscular Q6H PRN Basilio Brown MD      hydrOXYzine HCL  25 mg Oral Q6H PRN Max 4/day Basilio Brown MD      levothyroxine  37.5 mcg Oral Early Morning WALLY Baptiste      melatonin  6 mg Oral HS PRN WALLY Gomez      methocarbamol  500 mg Oral Q6H PRN WALLY Baptiste      OLANZapine  5 mg Oral Q4H PRN Max 3/day Basilio Brown MD      Or    OLANZapine  2.5 mg Intramuscular Q4H PRN Max 3/day Basilio Brown MD      OLANZapine  5 mg Oral Q3H PRN Max 3/day Basilio Brown MD      Or    OLANZapine  5 mg Intramuscular Q3H PRN Max 3/day Basilio Brown MD      OLANZapine  2.5 mg Oral Q4H PRN Max 6/day Basilio Brown MD      polyethylene glycol  17 g Oral Daily PRN Basilio Brown MD      propranolol  10 mg Oral Q8H PRN Basilio Brown MD      senna-docusate sodium  1 tablet Oral Daily  SHELBY Brown MD      sertraline  150 mg Oral Daily WALLY Gomez      traZODone  50 mg Oral HS SHELBY Brown MD         Risks / Benefits of Treatment:  Risks, benefits, and possible side effects of medications explained to patient and patient verbalizes understanding and agreement for treatment.    Counseling / Coordination of Care:  Patient's progress reviewed with nursing staff.  Medications, treatment progress and treatment plan reviewed with patient.  Supportive counseling provided to the patient.    Total floor/unit time spent today 25 minutes. Greater than 50% of total time was spent with the patient and / or family counseling and / or coordination of care. A description of the counseling / coordination of care: medication education, treatment plan, supportive therapy.

## 2024-08-16 NOTE — PROGRESS NOTES
08/16/24 0836   Team Meeting   Meeting Type Daily Rounds   Team Members Present   Team Members Present Physician;;Nurse   Physician Team Member Gisela   [facility] Noa   Patient/Family Present   Patient Present No   Patient's Family Present No     Pt medication and meal compliant. Pt denies SI/HI/AVH. Pt is cooperative and pending placement.

## 2024-08-16 NOTE — NURSING NOTE
Pleasant and cooperative during interaction. Continues to report sleeping well at night. Denies symptoms. Waiting placement.

## 2024-08-16 NOTE — PLAN OF CARE
Problem: Ineffective Coping  Goal: Identifies ineffective coping skills  Outcome: Progressing  Goal: Identifies healthy coping skills  Outcome: Progressing  Goal: Demonstrates healthy coping skills  Outcome: Progressing  Goal: Participates in unit activities  Description: Interventions:  - Provide therapeutic environment   - Provide required programming   - Redirect inappropriate behaviors   Outcome: Progressing     Problem: Risk for Self Injury/Neglect  Goal: Treatment Goal: Remain safe during length of stay, learn and adopt new coping skills, and be free of self-injurious ideation, impulses and acts at the time of discharge  Outcome: Progressing     Problem: Depression  Goal: Treatment Goal: Demonstrate behavioral control of depressive symptoms, verbalize feelings of improved mood/affect, and adopt new coping skills prior to discharge  Outcome: Progressing  Goal: Verbalize thoughts and feelings  Description: Interventions:  - Assess and re-assess patient's level of risk   - Engage patient in 1:1 interactions, daily, for a minimum of 15 minutes   - Encourage patient to express feelings, fears, frustrations, hopes   Outcome: Progressing  Goal: Refrain from harming self  Description: Interventions:  - Monitor patient closely, per order   - Supervise medication ingestion, monitor effects and side effects   Outcome: Progressing  Goal: Refrain from isolation  Description: Interventions:  - Develop a trusting relationship   - Encourage socialization   Outcome: Progressing  Goal: Refrain from self-neglect  Outcome: Progressing  Goal: Attend and participate in unit activities, including therapeutic, recreational, and educational groups  Description: Interventions:  - Provide therapeutic and educational activities daily, encourage attendance and participation, and document same in the medical record   Outcome: Progressing  Goal: Complete daily ADLs, including personal hygiene independently, as able  Description:  Interventions:  - Observe, teach, and assist patient with ADLS  -  Monitor and promote a balance of rest/activity, with adequate nutrition and elimination   Outcome: Progressing     Problem: DISCHARGE PLANNING - CARE MANAGEMENT  Goal: Discharge to post-acute care or home with appropriate resources  Description: INTERVENTIONS:  - Conduct assessment to determine patient/family and health care team treatment goals, and need for post-acute services based on payer coverage, community resources, and patient preferences, and barriers to discharge  - Address psychosocial, clinical, and financial barriers to discharge as identified in assessment in conjunction with the patient/family and health care team  - Arrange appropriate level of post-acute services according to patient’s   needs and preference and payer coverage in collaboration with the physician and health care team  - Communicate with and update the patient/family, physician, and health care team regarding progress on the discharge plan  - Arrange appropriate transportation to post-acute venues  Outcome: Progressing     Problem: Anxiety  Goal: Anxiety is at manageable level  Description: Interventions:  - Assess and monitor patient's anxiety level.   - Monitor for signs and symptoms (heart palpitations, chest pain, shortness of breath, headaches, nausea, feeling jumpy, restlessness, irritable, apprehensive).   - Collaborate with interdisciplinary team and initiate plan and interventions as ordered.  - [place] patient to unit/surroundings  - Explain treatment plan  - Encourage participation in care  - Encourage verbalization of concerns/fears  - Identify coping mechanisms  - Assist in developing anxiety-reducing skills  - Administer/offer alternative therapies  - Limit or eliminate stimulants  Outcome: Progressing     Problem: Knowledge Deficit  Goal: Patient/family/caregiver demonstrates understanding of disease process, treatment plan, medications, and discharge  instructions  Description: Complete learning assessment and assess knowledge base.  Interventions:  - Provide teaching at level of understanding  - Provide teaching via preferred learning methods  Outcome: Progressing

## 2024-08-17 PROCEDURE — 99232 SBSQ HOSP IP/OBS MODERATE 35: CPT | Performed by: PSYCHIATRY & NEUROLOGY

## 2024-08-17 RX ADMIN — SERTRALINE HYDROCHLORIDE 150 MG: 100 TABLET ORAL at 08:47

## 2024-08-17 RX ADMIN — ARIPIPRAZOLE 5 MG: 5 TABLET ORAL at 08:47

## 2024-08-17 RX ADMIN — CHOLECALCIFEROL TAB 25 MCG (1000 UNIT) 2000 UNITS: 25 TAB at 08:47

## 2024-08-17 RX ADMIN — FAMOTIDINE 20 MG: 20 TABLET ORAL at 17:05

## 2024-08-17 RX ADMIN — FAMOTIDINE 20 MG: 20 TABLET ORAL at 08:47

## 2024-08-17 RX ADMIN — CYANOCOBALAMIN TAB 1000 MCG 1000 MCG: 1000 TAB at 08:47

## 2024-08-17 RX ADMIN — LEVOTHYROXINE SODIUM 37.5 MCG: 125 TABLET ORAL at 06:12

## 2024-08-17 RX ADMIN — ATORVASTATIN CALCIUM 10 MG: 10 TABLET, FILM COATED ORAL at 17:05

## 2024-08-17 NOTE — NURSING NOTE
Took over pt care @ 1500. Pt walking the halls and engaging with staff and select peers. Denies SI/HI/AH/VH and states he is feeling good but does appear depressed. Denies any unmet needs or complaints at this time.

## 2024-08-17 NOTE — PROGRESS NOTES
Progress Note - Behavioral Health   Franco Roberson 39 y.o. male MRN: [id number]  Unit/Bed#: [facility] 345-02 Encounter: [id number]    Assessment & Plan   Principal Problem:    MDD (major depressive disorder), recurrent severe, without psychosis (HCC)  Active Problems:    Unspecified depressive disorder (HCC)    Medical clearance for psychiatric admission    Hyperlipidemia    Vitamin D deficiency    B12 deficiency    Hypothyroidism    Rule Out Autism spectrum disorder      Behavior over the last 24 hours:  unchanged  Sleep: normal  Appetite: normal  Medication side effects: No  ROS: no complaints        Mental Status Evaluation:  Appearance:  age appropriate and casually dressed   Behavior:  Cooperative    Speech:  normal pitch and normal volume   Mood:  constricted   Affect:  constricted   Thought Process:  goal directed   Associations: intact associations   Thought Content:  normal   Perceptual Disturbances: None   Risk Potential: Suicidal Ideations none  Homicidal Ideations none  Potential for Aggression No   Sensorium:  person, place, and time/date   Memory:  recent and remote memory grossly intact   Consciousness:  alert and awake    Attention: attention span appeared shorter than expected for age   Insight:  limited   Judgment: limited   Gait/Station: normal gait/station and normal balance   Motor Activity: no abnormal movements     Progress Toward Goals: Patient remains compliant with medications and denies side effects. According to staff report he is mainly seclusive to room. Out for breakfast and lunch. Pleasant during interaction.  Today he denies SI or HI and denies A/V hallucinations. Denies depressed or anxious mood.  Agrees to continue current treatment. Awaiting placement.    Recommended Treatment: Continue with group therapy, milieu therapy and occupational therapy.      1.Continue with group therapy, milieu therapy and occupational therapy  2.Behavioral Health checks every 7 minutes  3.Continue frequent  safety checks and vitals per unit protocol  4.Continue with [facility] medical management as indicated  5.Continue with current medication regimen: Zoloft 150mg PO daily and Abilify 5mg PO daily.  Franco is awaiting placement   6.Will review labs in the a.m.  7.Disposition Planning: Discharge planning and efforts remain ongoing    Risks, benefits and possible side effects of Medications:   Risks, benefits, and possible side effects of medications explained to patient and patient verbalizes understanding.      Medications: all current active meds have been reviewed, continue current psychiatric medications, and current meds:   Current Facility-Administered Medications   Medication Dose Route Frequency    acetaminophen (TYLENOL) tablet 650 mg  650 mg Oral Q6H PRN    acetaminophen (TYLENOL) tablet 650 mg  650 mg Oral Q4H PRN    acetaminophen (TYLENOL) tablet 975 mg  975 mg Oral Q6H PRN    aluminum-magnesium hydroxide-simethicone (MAALOX) oral suspension 30 mL  30 mL Oral Q4H PRN    ARIPiprazole (ABILIFY) tablet 5 mg  5 mg Oral Daily    Artificial Tears ophthalmic solution 1 drop  1 drop Both Eyes Q3H PRN    atorvastatin (LIPITOR) tablet 10 mg  10 mg Oral Daily With Dinner    benztropine (COGENTIN) injection 1 mg  1 mg Intramuscular Q4H PRN Max 6/day    benztropine (COGENTIN) tablet 1 mg  1 mg Oral Q4H PRN Max 6/day    Cholecalciferol (VITAMIN D3) tablet 2,000 Units  2,000 Units Oral Daily    cyanocobalamin (VITAMIN B-12) tablet 1,000 mcg  1,000 mcg Oral Daily    Diclofenac Sodium (VOLTAREN) 1 % topical gel 2 g  2 g Topical 4x Daily PRN    hydrOXYzine HCL (ATARAX) tablet 50 mg  50 mg Oral Q6H PRN Max 4/day    Or    diphenhydrAMINE (BENADRYL) injection 50 mg  50 mg Intramuscular Q6H PRN    famotidine (PEPCID) tablet 20 mg  20 mg Oral BID    hydrOXYzine HCL (ATARAX) tablet 100 mg  100 mg Oral Q6H PRN Max 4/day    Or    LORazepam (ATIVAN) injection 2 mg  2 mg Intramuscular Q6H PRN    hydrOXYzine HCL (ATARAX) tablet 25 mg  25 mg  Oral Q6H PRN Max 4/day    levothyroxine tablet 37.5 mcg  37.5 mcg Oral Early Morning    melatonin tablet 6 mg  6 mg Oral HS PRN    methocarbamol (ROBAXIN) tablet 500 mg  500 mg Oral Q6H PRN    OLANZapine (ZyPREXA) tablet 5 mg  5 mg Oral Q4H PRN Max 3/day    Or    OLANZapine (ZyPREXA) IM injection 2.5 mg  2.5 mg Intramuscular Q4H PRN Max 3/day    OLANZapine (ZyPREXA) tablet 5 mg  5 mg Oral Q3H PRN Max 3/day    Or    OLANZapine (ZyPREXA) IM injection 5 mg  5 mg Intramuscular Q3H PRN Max 3/day    OLANZapine (ZyPREXA) tablet 2.5 mg  2.5 mg Oral Q4H PRN Max 6/day    polyethylene glycol (MIRALAX) packet 17 g  17 g Oral Daily PRN    propranolol (INDERAL) tablet 10 mg  10 mg Oral Q8H PRN    senna-docusate sodium (SENOKOT S) 8.6-50 mg per tablet 1 tablet  1 tablet Oral Daily PRN    sertraline (ZOLOFT) tablet 150 mg  150 mg Oral Daily    traZODone (DESYREL) tablet 50 mg  50 mg Oral HS PRN   .    Labs: I have personally reviewed all pertinent laboratory/tests results. Most Recent Labs:   Lab Results   Component Value Date    WBC 7.01 06/27/2024    RBC 4.54 06/27/2024    HGB 14.5 06/27/2024    HCT 44.4 06/27/2024     06/27/2024    RDW 12.0 06/27/2024    NEUTROABS 4.01 06/27/2024    SODIUM 139 07/10/2024    K 3.8 07/10/2024     07/10/2024    CO2 27 07/10/2024    BUN 16 07/10/2024    CREATININE 0.92 07/10/2024    GLUC 87 07/10/2024    GLUF 87 07/10/2024    CALCIUM 9.3 07/10/2024    AST 28 07/10/2024    ALT 36 07/10/2024    ALKPHOS 66 07/10/2024    TP 7.7 07/10/2024    ALB 4.4 07/10/2024    TBILI 0.55 07/10/2024    CHOLESTEROL 159 08/09/2024    HDL 54 08/09/2024    TRIG 88 08/09/2024    LDLCALC 87 08/09/2024    NONHDLC 105 08/09/2024    [id number] 5.705 (H) 07/10/2024    FREET4 0.54 (L) 07/10/2024       Counseling / Coordination of Care  Total floor / unit time spent today n/a minutes. Greater than 50% of total time was spent with the patient and / or family counseling and / or coordination of care. A description of  the counseling / coordination of care:

## 2024-08-17 NOTE — NURSING NOTE
Mainly seclusive to room. Out for breakfast and lunch. Pleasant during interaction. Denies symptoms. No complaints. Compliant with medications. Waiting placement.

## 2024-08-17 NOTE — NURSING NOTE
Patient remained secluded to room early in shift , appears to be withdrawn and depressed. Patient denies depression and SI/HI/AH/VH at this time . Patient currently in small tv ,offers no complaints of pain or discomfort.

## 2024-08-18 PROCEDURE — 99232 SBSQ HOSP IP/OBS MODERATE 35: CPT | Performed by: STUDENT IN AN ORGANIZED HEALTH CARE EDUCATION/TRAINING PROGRAM

## 2024-08-18 RX ADMIN — ATORVASTATIN CALCIUM 10 MG: 10 TABLET, FILM COATED ORAL at 17:33

## 2024-08-18 RX ADMIN — LEVOTHYROXINE SODIUM 37.5 MCG: 125 TABLET ORAL at 05:20

## 2024-08-18 RX ADMIN — FAMOTIDINE 20 MG: 20 TABLET ORAL at 08:45

## 2024-08-18 RX ADMIN — CYANOCOBALAMIN TAB 1000 MCG 1000 MCG: 1000 TAB at 08:46

## 2024-08-18 RX ADMIN — SERTRALINE HYDROCHLORIDE 150 MG: 100 TABLET ORAL at 08:45

## 2024-08-18 RX ADMIN — FAMOTIDINE 20 MG: 20 TABLET ORAL at 17:33

## 2024-08-18 RX ADMIN — ARIPIPRAZOLE 5 MG: 5 TABLET ORAL at 08:45

## 2024-08-18 RX ADMIN — CHOLECALCIFEROL TAB 25 MCG (1000 UNIT) 2000 UNITS: 25 TAB at 08:46

## 2024-08-18 NOTE — PROGRESS NOTES
Progress Note - Behavioral Health   Franco Roberson 39 y.o. male MRN: [id number]  Unit/Bed#: [facility] 345-02 Encounter: [id number]    Assessment & Plan   Principal Problem:    MDD (major depressive disorder), recurrent severe, without psychosis (HCC)  Active Problems:    Unspecified depressive disorder (HCC)    Medical clearance for psychiatric admission    Hyperlipidemia    Vitamin D deficiency    B12 deficiency    Hypothyroidism    Rule Out Autism spectrum disorder    Recommended Treatment:     All current active medications have been reviewed  Encourage group therapy, milieu therapy and occupational therapy  Behavioral Health checks every 7 minutes  Await placement  Requires continued inpatient treatment due to chronic illness and high risk of decompensation if discharged before long term stability is achieved  Requires continued inpatient treatment while awaiting placement due to chronic illness and high risk of decompensation if discharged to an unstructured environment  Requires continued inpatient treatment while awaiting arrangement for outpatient services due to chronic illness and high risk of decompensation if discharged without adequate services in place  Medical management per SLIM.    Commitment Status: 201  ----------------------------------------      Subjective: Patient discussed in nursing report and overnight notes and charting reviewed.  Patient is calm and cooperative and pleasant upon approach.  Patient takes medications as prescribed and denies side effects.  Patient is visible on the unit and social with peers.  Denies SI, HI, AVH    Behavior over the last 24 hours:  improved  Sleep: normal  Appetite: normal  Medication side effects: No  ROS: no complaints and all other systems are negative    Mental Status Evaluation:  Appearance:  age appropriate, casually dressed, dressed appropriately   Behavior:  normal, pleasant, cooperative   Speech:  normal rate and volume   Mood:  euthymic   Affect:   appropriate   Thought Process:  organized, logical, coherent   Associations: intact associations   Thought Content:  normal, no overt delusions   Perceptual Disturbances: denies auditory hallucinations when asked, does not appear responding to internal stimuli   Risk Potential: Suicidal ideation - None at present  Homicidal ideation - None at present  Potential for aggression - Not at present   Sensorium:  oriented to person, place, and time/date   Memory:  recent and remote memory grossly intact   Consciousness:  alert and awake   Attention/Concentration: attention span and concentration are age appropriate   Insight:  improving   Judgment: improving   Gait/Station: normal gait/station   Motor Activity: no abnormal movements     Medications: all current active meds have been reviewed, continue current psychiatric medications, and current meds:   Current Facility-Administered Medications   Medication Dose Route Frequency    acetaminophen (TYLENOL) tablet 650 mg  650 mg Oral Q6H PRN    acetaminophen (TYLENOL) tablet 650 mg  650 mg Oral Q4H PRN    acetaminophen (TYLENOL) tablet 975 mg  975 mg Oral Q6H PRN    aluminum-magnesium hydroxide-simethicone (MAALOX) oral suspension 30 mL  30 mL Oral Q4H PRN    ARIPiprazole (ABILIFY) tablet 5 mg  5 mg Oral Daily    Artificial Tears ophthalmic solution 1 drop  1 drop Both Eyes Q3H PRN    atorvastatin (LIPITOR) tablet 10 mg  10 mg Oral Daily With Dinner    benztropine (COGENTIN) injection 1 mg  1 mg Intramuscular Q4H PRN Max 6/day    benztropine (COGENTIN) tablet 1 mg  1 mg Oral Q4H PRN Max 6/day    Cholecalciferol (VITAMIN D3) tablet 2,000 Units  2,000 Units Oral Daily    cyanocobalamin (VITAMIN B-12) tablet 1,000 mcg  1,000 mcg Oral Daily    Diclofenac Sodium (VOLTAREN) 1 % topical gel 2 g  2 g Topical 4x Daily PRN    hydrOXYzine HCL (ATARAX) tablet 50 mg  50 mg Oral Q6H PRN Max 4/day    Or    diphenhydrAMINE (BENADRYL) injection 50 mg  50 mg Intramuscular Q6H PRN    famotidine  (PEPCID) tablet 20 mg  20 mg Oral BID    hydrOXYzine HCL (ATARAX) tablet 100 mg  100 mg Oral Q6H PRN Max 4/day    Or    LORazepam (ATIVAN) injection 2 mg  2 mg Intramuscular Q6H PRN    hydrOXYzine HCL (ATARAX) tablet 25 mg  25 mg Oral Q6H PRN Max 4/day    levothyroxine tablet 37.5 mcg  37.5 mcg Oral Early Morning    melatonin tablet 6 mg  6 mg Oral HS PRN    methocarbamol (ROBAXIN) tablet 500 mg  500 mg Oral Q6H PRN    OLANZapine (ZyPREXA) tablet 5 mg  5 mg Oral Q4H PRN Max 3/day    Or    OLANZapine (ZyPREXA) IM injection 2.5 mg  2.5 mg Intramuscular Q4H PRN Max 3/day    OLANZapine (ZyPREXA) tablet 5 mg  5 mg Oral Q3H PRN Max 3/day    Or    OLANZapine (ZyPREXA) IM injection 5 mg  5 mg Intramuscular Q3H PRN Max 3/day    OLANZapine (ZyPREXA) tablet 2.5 mg  2.5 mg Oral Q4H PRN Max 6/day    polyethylene glycol (MIRALAX) packet 17 g  17 g Oral Daily PRN    propranolol (INDERAL) tablet 10 mg  10 mg Oral Q8H PRN    senna-docusate sodium (SENOKOT S) 8.6-50 mg per tablet 1 tablet  1 tablet Oral Daily PRN    sertraline (ZOLOFT) tablet 150 mg  150 mg Oral Daily    traZODone (DESYREL) tablet 50 mg  50 mg Oral HS PRN   .  Current Facility-Administered Medications   Medication Dose Route Frequency Provider Last Rate    acetaminophen  650 mg Oral Q6H PRN Basilio Brown MD      acetaminophen  650 mg Oral Q4H PRN Basilio Brown MD      acetaminophen  975 mg Oral Q6H PRN Basilio Brown MD      aluminum-magnesium hydroxide-simethicone  30 mL Oral Q4H PRN Basilio Borwn MD      ARIPiprazole  5 mg Oral Daily Basilio Panda MD      Artificial Tears  1 drop Both Eyes Q3H PRN Basilio Brown MD      atorvastatin  10 mg Oral Daily With Dinner WALLY Baptiste      benztropine  1 mg Intramuscular Q4H PRN Max 6/day Basilio Brown MD      benztropine  1 mg Oral Q4H PRN Max 6/day Basilio Brown MD      Cholecalciferol  2,000 Units Oral Daily WALLY Baptiste      cyanocobalamin   1,000 mcg Oral Daily Laura Romegrady Ashford, WALLY      Diclofenac Sodium  2 g Topical 4x Daily PRN WALLY Baptiste      hydrOXYzine HCL  50 mg Oral Q6H PRN Max 4/day Basilio Brown MD      Or    diphenhydrAMINE  50 mg Intramuscular Q6H PRN Basilio Brown MD      famotidine  20 mg Oral BID Laura WALLY Olmos      hydrOXYzine HCL  100 mg Oral Q6H PRN Max 4/day Basilio Brown MD      Or    LORazepam  2 mg Intramuscular Q6H PRN Basilio Brown MD      hydrOXYzine HCL  25 mg Oral Q6H PRN Max 4/day Basilio Brown MD      levothyroxine  37.5 mcg Oral Early Morning Laura Amber Ashford, ARJUNNP      melatonin  6 mg Oral HS PRN WALLY Gomez      methocarbamol  500 mg Oral Q6H PRN WALLY Baptiste      OLANZapine  5 mg Oral Q4H PRN Max 3/day Basilio Brown MD      Or    OLANZapine  2.5 mg Intramuscular Q4H PRN Max 3/day Basilio Brown MD      OLANZapine  5 mg Oral Q3H PRN Max 3/day Basilio Brown MD      Or    OLANZapine  5 mg Intramuscular Q3H PRN Max 3/day Basilio Brown MD      OLANZapine  2.5 mg Oral Q4H PRN Max 6/day Basilio Brown MD      polyethylene glycol  17 g Oral Daily PRN Basilio Brown MD      propranolol  10 mg Oral Q8H PRN Basilio Brown MD      senna-docusate sodium  1 tablet Oral Daily PRN Basilio Bronw MD      sertraline  150 mg Oral Daily WALLY Gomez      traZODone  50 mg Oral HS PRN Basilio Brown MD         Labs: I have personally reviewed all pertinent laboratory/tests results  Most Recent Labs:     Results from the past 24 hours: No results found for this or any previous visit (from the past 24 hour(s)).  Most Recent Labs:  @RESUFAST(WBC,RBC,HGB,HCT,PLT,RDW,NEUTROABS,TOTANEUTABS,SODIUM,K,CL,CO2,BUN,CREATININE,GLUC,CALCIUM,AST,ALT,ALKPHOS,TP,ALB,TBILI,CHOLESTEROL,HDL,TRIG,LDLCALC,NONHDLC,VALPROICTOT,CARBAMAZEPIN,LITHIUM,AMMONIA,[id number],FREET4,T3FREE,EXTPREGUR,PREGUR,PREGSERUM,HCG,HCGQUANT,RPR,HGBA1C,EAG)@  Last Laboratory Results with Depakote and/or Tegretol levels:   Lab Results   Component Value Date    WBC 7.01 06/27/2024    RBC 4.54 06/27/2024    HGB 14.5 06/27/2024    HCT 44.4 06/27/2024     06/27/2024    RDW 12.0 06/27/2024    NEUTROABS 4.01 06/27/2024    SODIUM 139 07/10/2024    K 3.8 07/10/2024     07/10/2024    CO2 27 07/10/2024    BUN 16 07/10/2024    CREATININE 0.92 07/10/2024    GLUC 87 07/10/2024    CALCIUM 9.3 07/10/2024    AST 28 07/10/2024    ALT 36 07/10/2024    ALKPHOS 66 07/10/2024    TP 7.7 07/10/2024    ALB 4.4 07/10/2024    TBILI 0.55 07/10/2024     Last Laboratory Results with Lithium level:   Lab Results   Component Value Date    SODIUM 139 07/10/2024    K 3.8 07/10/2024     07/10/2024    CO2 27 07/10/2024    BUN 16 07/10/2024    CREATININE 0.92 07/10/2024    GLUC 87 07/10/2024    CALCIUM 9.3 07/10/2024     Labs in last 72 hours: @LABRCNT(WBC,RBC,HGB,HCT,PLT,RDW,TOTANEUTABS,NEUTROABS,SODIUM,K,CL,CO2,BUN,CREATININE,GLUC,CALCIUM,AST,ALT,ALKPHOS,TP,ALB,TBILI,CHOLESTEROL,HDL,TRIG,LDLCALC,VALPROICTOT,CARBAMAZEPIN,LITHIUM,AMMONIA,[id number],FREET4,T3FREE,PREGTESTUR,PREGSERUM,HCG,HCGQUANT,RPR)@  Admission Labs:   Admission on 05/14/2024   Component Date Value    Sodium 05/15/2024 142     Potassium 05/15/2024 4.1     Chloride 05/15/2024 106     CO2 05/15/2024 28     ANION GAP 05/15/2024 8     BUN 05/15/2024 13     Creatinine 05/15/2024 0.97     Glucose 05/15/2024 89     Glucose, Fasting 05/15/2024 89     Calcium 05/15/2024 9.3     AST 05/15/2024 21     ALT 05/15/2024 19     Alkaline Phosphatase 05/15/2024 66     Total Protein 05/15/2024 7.6     Albumin 05/15/2024 4.3     Total Bilirubin  05/15/2024 0.69     eGFR 05/15/2024 97     WBC 05/15/2024 5.42     RBC 05/15/2024 5.05     Hemoglobin 05/15/2024 16.1     Hematocrit 05/15/2024 49.4 (H)     MCV 05/15/2024 98     MCH 05/15/2024 31.9     MCHC 05/15/2024 32.6     RDW 05/15/2024 12.6     MPV 05/15/2024 9.2     Platelets 05/15/2024 268     nRBC 05/15/2024 0     Segmented % 05/15/2024 53     Immature Grans % 05/15/2024 0     Lymphocytes % 05/15/2024 38     Monocytes % 05/15/2024 7     Eosinophils Relative 05/15/2024 2     Basophils Relative 05/15/2024 0     Absolute Neutrophils 05/15/2024 2.83     Absolute Immature Grans 05/15/2024 0.01     Absolute Lymphocytes 05/15/2024 2.06     Absolute Monocytes 05/15/2024 0.40     Eosinophils Absolute 05/15/2024 0.10     Basophils Absolute 05/15/2024 0.02     TSH 3RD GENERATON 05/15/2024 5.162 (H)     Vitamin B-12 05/15/2024 168 (L)     Folate 05/15/2024 >22.3     Vit D, 25-Hydroxy 05/15/2024 11.6 (L)     Cholesterol 05/15/2024 191     Triglycerides 05/15/2024 116     HDL, Direct 05/15/2024 44     LDL Calculated 05/15/2024 124 (H)     Non-HDL-Chol (CHOL-HDL) 05/15/2024 147     Syphilis Total Antibody 05/15/2024 Non-reactive     Free T4 05/15/2024 0.60 (L)     Ventricular Rate 05/15/2024 66     Atrial Rate 05/15/2024 66     PA Interval 05/15/2024 146     QRSD Interval 05/15/2024 70     QT Interval 05/15/2024 402     QTC Interval 05/15/2024 421     P Axis 05/15/2024 39     QRS Axis 05/15/2024 48     T Wave Axis 05/15/2024 41     Ventricular Rate 05/15/2024 61     Atrial Rate 05/15/2024 61     PA Interval 05/15/2024 188     QRSD Interval 05/15/2024 92     QT Interval 05/15/2024 426     QTC Interval 05/15/2024 428     P Axis 05/15/2024 56     QRS Axis 05/15/2024 50     T Wave Axis 05/15/2024 46     Color, UA 06/26/2024 Yellow     Clarity, UA 06/26/2024 Slightly Cloudy (A)     Specific [place], UA 06/26/2024 1.020     pH, UA 06/26/2024 6.0     Leukocytes, UA 06/26/2024 Negative     Nitrite, UA 06/26/2024 Negative      Protein, UA 06/26/2024 15 (Trace) (A)     Glucose, UA 06/26/2024 Negative     Ketones, UA 06/26/2024 Negative     Bilirubin, UA 06/26/2024 Negative     Occult Blood, UA 06/26/2024 Negative     UROBILINOGEN UA 06/26/2024 Negative     RBC, UA 06/26/2024 None Seen     WBC, UA 06/26/2024 None Seen     Epithelial Cells 06/26/2024 None Seen     Bacteria, UA 06/26/2024 Moderate (A)     AMORPH URATES 06/26/2024 Moderate     MUCUS THREADS 06/26/2024 Occasional (A)     Sodium 06/27/2024 138     Potassium 06/27/2024 4.5     Chloride 06/27/2024 101     CO2 06/27/2024 30     ANION GAP 06/27/2024 7     BUN 06/27/2024 21     Creatinine 06/27/2024 1.02     Glucose 06/27/2024 85     Calcium 06/27/2024 9.7     AST 06/27/2024 20     ALT 06/27/2024 22     Alkaline Phosphatase 06/27/2024 68     Total Protein 06/27/2024 7.9     Albumin 06/27/2024 4.4     Total Bilirubin 06/27/2024 0.32     eGFR 06/27/2024 92     WBC 06/27/2024 7.01     RBC 06/27/2024 4.54     Hemoglobin 06/27/2024 14.5     Hematocrit 06/27/2024 44.4     MCV 06/27/2024 98     MCH 06/27/2024 31.9     MCHC 06/27/2024 32.7     RDW 06/27/2024 12.0     MPV 06/27/2024 8.9     Platelets 06/27/2024 249     nRBC 06/27/2024 0     Segmented % 06/27/2024 58     Immature Grans % 06/27/2024 0     Lymphocytes % 06/27/2024 31     Monocytes % 06/27/2024 9     Eosinophils Relative 06/27/2024 2     Basophils Relative 06/27/2024 0     Absolute Neutrophils 06/27/2024 4.01     Absolute Immature Grans 06/27/2024 0.02     Absolute Lymphocytes 06/27/2024 2.14     Absolute Monocytes 06/27/2024 0.65     Eosinophils Absolute 06/27/2024 0.17     Basophils Absolute 06/27/2024 0.02     QFT Nil 07/03/2024 0.03     QFT TB1-NIL 07/03/2024 0.02     QFT TB2-NIL 07/03/2024 0.04     QFT Mitogen-NIL 07/03/2024 9.27     QFT Final Interpretation 07/03/2024 Negative     Cholesterol 07/05/2024 175     Triglycerides 07/05/2024 99     HDL, Direct 07/05/2024 53     LDL Calculated 07/05/2024 102 (H)     Non-HDL-Chol  (CHOL-HDL) 07/05/2024 122     Clarity, UA 07/09/2024 Clear     Color, UA 07/09/2024 Straw     Specific [place], UA 07/09/2024 1.015     pH, UA 07/09/2024 6.0     Glucose, UA 07/09/2024 Negative     Ketones, UA 07/09/2024 Negative     Occult Blood, UA 07/09/2024 Negative     Protein, UA 07/09/2024 Negative     Nitrite, UA 07/09/2024 Negative     Bilirubin, UA 07/09/2024 Negative     Leukocytes, UA 07/09/2024 Negative     WBC, UA 07/09/2024 None Seen     RBC, UA 07/09/2024 None Seen     Bacteria, UA 07/09/2024 None Seen     Epithelial Cells 07/09/2024 None Seen     UROBILINOGEN UA 07/09/2024 Negative     Urine Culture 07/09/2024 No Growth <1000 cfu/mL     TSH 3RD GENERATON 07/10/2024 5.705 (H)     Sodium 07/10/2024 139     Potassium 07/10/2024 3.8     Chloride 07/10/2024 103     CO2 07/10/2024 27     ANION GAP 07/10/2024 9     BUN 07/10/2024 16     Creatinine 07/10/2024 0.92     Glucose 07/10/2024 87     Glucose, Fasting 07/10/2024 87     Calcium 07/10/2024 9.3     AST 07/10/2024 28     ALT 07/10/2024 36     Alkaline Phosphatase 07/10/2024 66     Total Protein 07/10/2024 7.7     Albumin 07/10/2024 4.4     Total Bilirubin 07/10/2024 0.55     eGFR 07/10/2024 104     Free T4 07/10/2024 0.54 (L)     Cholesterol 08/09/2024 159     Triglycerides 08/09/2024 88     HDL, Direct 08/09/2024 54     LDL Calculated 08/09/2024 87     Non-HDL-Chol (CHOL-HDL) 08/09/2024 105        Progress Toward Goals: improving    Risks / Benefits of Treatment:    Risks, benefits, and possible side effects of medications explained to patient and patient verbalizes understanding and agreement for treatment.    Counseling / Coordination of Care:    Total floor / unit time spent today 15 minutes. Greater than 50% of total time was spent with the patient and / or family counseling and / or coordination of care.     A description of counseling / coordination of care:  Patient's progress discussed with staff in treatment team meeting.  Treatment plan,  treatment progress and medication changes were reviewed with nursing staff, pharmacy service, and case management in interdisciplinary treatment team meeting.  Medications, treatment progress and treatment plan reviewed with patient.  Recent stressors including limited support, social difficulties, ongoing anxiety, and chronic mental illness discussed with patient.  Educated on importance of medication and treatment compliance.  Reassurance and supportive therapy provided.  Encouraged participation in milieu and group therapy on the unit.      Tatyana Wong MD 08/18/24

## 2024-08-18 NOTE — NURSING NOTE
Visible at the start of the shift for breakfast and morning medications. Currently in bed resting. Pleasant, calm and cooperative. States that he is feeling good. Denies symptoms. Waiting placement.

## 2024-08-19 PROCEDURE — 99232 SBSQ HOSP IP/OBS MODERATE 35: CPT | Performed by: PSYCHIATRY & NEUROLOGY

## 2024-08-19 RX ADMIN — FAMOTIDINE 20 MG: 20 TABLET ORAL at 08:07

## 2024-08-19 RX ADMIN — ARIPIPRAZOLE 5 MG: 5 TABLET ORAL at 08:07

## 2024-08-19 RX ADMIN — LEVOTHYROXINE SODIUM 37.5 MCG: 125 TABLET ORAL at 06:21

## 2024-08-19 RX ADMIN — ATORVASTATIN CALCIUM 10 MG: 10 TABLET, FILM COATED ORAL at 17:07

## 2024-08-19 RX ADMIN — CHOLECALCIFEROL TAB 25 MCG (1000 UNIT) 2000 UNITS: 25 TAB at 08:07

## 2024-08-19 RX ADMIN — CYANOCOBALAMIN TAB 1000 MCG 1000 MCG: 1000 TAB at 08:07

## 2024-08-19 RX ADMIN — FAMOTIDINE 20 MG: 20 TABLET ORAL at 17:07

## 2024-08-19 RX ADMIN — SERTRALINE HYDROCHLORIDE 150 MG: 100 TABLET ORAL at 08:07

## 2024-08-19 NOTE — NURSING NOTE
Patient is calm and cooperative upon approach. Patient is visible on unit. Patient denies SI/HI/AH/VH. Patient is compliant with meds and meals.

## 2024-08-19 NOTE — PROGRESS NOTES
"Progress Note - Behavioral Health     Franco Roberson 39 y.o. male MRN: [id number]   Unit/Bed#: [facility] 345-02 Encounter: [id number]    Behavior over the last 24 hours: unchanged.     Franco was seen today in follow-up for continuation of care. Per staff, no acute events reported overnight. At times withdrawn and isolative to self.  Franco states he is doing well today and offers no acute complaints. He states that \"I am mentally better and coping well.\" He reports ongoing need to remain patient as he awaits placement. Franco adamantly denies suicidal/homicidal ideation in addition to thoughts of self-injury. Franco presently is contacting for safety on the unit and feels comfortable to confide in staff if thoughts arise. At time of interview, no overt psychosis elicited. Franco has been complaint with medications and tolerating without any side effects.     Sleep: normal  Appetite: normal  Medication side effects: No   ROS: no complaints, all other systems are negative    Mental Status Evaluation:    Appearance:  age appropriate, casually dressed, dressed appropriately   Behavior:  cooperative, guarded   Speech:  normal rate, normal volume, normal pitch   Mood:  euthymic   Affect:  constricted   Thought Process:  linear   Associations: intact associations   Thought Content:  no overt delusions   Perceptual Disturbances: no auditory hallucinations, no visual hallucinations, does not appear responding to internal stimuli   Risk Potential: Suicidal ideation - None at present  Homicidal ideation - None at present  Potential for aggression - No   Sensorium:  oriented to person, place, and time/date   Memory:  recent and remote memory grossly intact   Consciousness:  alert and awake   Attention/Concentration: attention span and concentration are age appropriate   Insight:  improving   Judgment: improving   Gait/Station: normal gait/station   Motor Activity: no abnormal movements     Vital signs in last 24 hours:    Temp:  [97.3 " °F (36.3 °C)-97.7 °F (36.5 °C)] 97.7 °F (36.5 °C)  HR:  [59-71] 59  Resp:  [16] 16  BP: (119-131)/(72-74) 119/74    Laboratory results: I have personally reviewed all pertinent laboratory/tests results    Results from the past 24 hours: No results found for this or any previous visit (from the past 24 hour(s)).    Progress Toward Goals: progressing    Assessment & Plan   Principal Problem:    MDD (major depressive disorder), recurrent severe, without psychosis (HCC)  Active Problems:    Unspecified depressive disorder (HCC)    Medical clearance for psychiatric admission    Hyperlipidemia    Vitamin D deficiency    B12 deficiency    Hypothyroidism    Rule Out Autism spectrum disorder      Recommended Treatment:     Planned medication and treatment changes:    All current active medications have been reviewed  Encourage group therapy, milieu therapy and occupational therapy  Behavioral Health checks every 7 minutes    Continue current medications. DC planning ongoing awaiting placement.     Current Facility-Administered Medications   Medication Dose Route Frequency Provider Last Rate    acetaminophen  650 mg Oral Q6H PRN Basilio Brown MD      acetaminophen  650 mg Oral Q4H PRN Basilio Brown MD      acetaminophen  975 mg Oral Q6H PRN Basilio Bronw MD      aluminum-magnesium hydroxide-simethicone  30 mL Oral Q4H PRN Basilio Brown MD      ARIPiprazole  5 mg Oral Daily Basilio Panda MD      Artificial Tears  1 drop Both Eyes Q3H PRN Basilio Brown MD      atorvastatin  10 mg Oral Daily With Dinner WALLY Baptiste      benztropine  1 mg Intramuscular Q4H PRN Max 6/day Basilio Brown MD      benztropine  1 mg Oral Q4H PRN Max 6/day Basilio Brown MD      Cholecalciferol  2,000 Units Oral Daily WALLY Baptiste      cyanocobalamin  1,000 mcg Oral Daily WALLY Baptiste      Diclofenac Sodium  2 g Topical 4x Daily PRN Laura Ashford  CRNP      hydrOXYzine HCL  50 mg Oral Q6H PRN Max 4/day Basilio Brown MD      Or    diphenhydrAMINE  50 mg Intramuscular Q6H PRN Basilio Brown MD      famotidine  20 mg Oral BID Laura Romegrady Ashford, WALLY      hydrOXYzine HCL  100 mg Oral Q6H PRN Max 4/day Basilio Brown MD      Or    LORazepam  2 mg Intramuscular Q6H PRN Basilio Brown MD      hydrOXYzine HCL  25 mg Oral Q6H PRN Max 4/day Basilio Brown MD      levothyroxine  37.5 mcg Oral Early Morning Laura sAhford, WALLY      melatonin  6 mg Oral HS PRN WALLY Gomez      methocarbamol  500 mg Oral Q6H PRN Laura Ashford, WALLY      OLANZapine  5 mg Oral Q4H PRN Max 3/day Basilio Brown MD      Or    OLANZapine  2.5 mg Intramuscular Q4H PRN Max 3/day Basilio Brown MD      OLANZapine  5 mg Oral Q3H PRN Max 3/day Basilio Brown MD      Or    OLANZapine  5 mg Intramuscular Q3H PRN Max 3/day Basilio Brown MD      OLANZapine  2.5 mg Oral Q4H PRN Max 6/day Basilio Brown MD      polyethylene glycol  17 g Oral Daily PRN Basilio Brown MD      propranolol  10 mg Oral Q8H PRN Basilio Brown MD      senna-docusate sodium  1 tablet Oral Daily PRN Basilio Brown MD      sertraline  150 mg Oral Daily WALLY Gomez      traZODone  50 mg Oral HS PRN Basilio Brown MD       Risks / Benefits of Treatment:    Risks, benefits, and possible side effects of medications explained to patient and patient verbalizes understanding and agreement for treatment.    Counseling / Coordination of Care:    Total floor / unit time spent today 20 minutes. Greater than 50% of total time was spent with the patient and / or family counseling and / or coordination of care. A description of counseling / coordination of care:  Patient's progress discussed with staff in treatment team meeting.  Medications, treatment progress and treatment plan reviewed with patient.    Elizabeth Arreguin  TONY Pennington 08/19/24

## 2024-08-19 NOTE — PROGRESS NOTES
08/19/24 0843   Team Meeting   Meeting Type Daily Rounds   Team Members Present   Team Members Present Physician;;Nurse   Physician Team Member Aditi   [facility]   Patient/Family Present   Patient Present No   Patient's Family Present No     Pt is calm and cooperative. Pt is mainly seclusive to his room. Pt is medication and meal compliant. Pt is waiting placement.

## 2024-08-19 NOTE — NURSING NOTE
Pt calm and cooperative, visible for needs isolative to self. Denies psych symptoms. Denies any unmet needs at this time.

## 2024-08-20 PROCEDURE — 99232 SBSQ HOSP IP/OBS MODERATE 35: CPT | Performed by: PSYCHIATRY & NEUROLOGY

## 2024-08-20 RX ADMIN — ATORVASTATIN CALCIUM 10 MG: 10 TABLET, FILM COATED ORAL at 17:18

## 2024-08-20 RX ADMIN — ARIPIPRAZOLE 5 MG: 5 TABLET ORAL at 08:12

## 2024-08-20 RX ADMIN — CYANOCOBALAMIN TAB 1000 MCG 1000 MCG: 1000 TAB at 08:11

## 2024-08-20 RX ADMIN — FAMOTIDINE 20 MG: 20 TABLET ORAL at 17:18

## 2024-08-20 RX ADMIN — SERTRALINE HYDROCHLORIDE 150 MG: 100 TABLET ORAL at 08:11

## 2024-08-20 RX ADMIN — CHOLECALCIFEROL TAB 25 MCG (1000 UNIT) 2000 UNITS: 25 TAB at 08:11

## 2024-08-20 RX ADMIN — LEVOTHYROXINE SODIUM 37.5 MCG: 125 TABLET ORAL at 06:31

## 2024-08-20 RX ADMIN — FAMOTIDINE 20 MG: 20 TABLET ORAL at 08:11

## 2024-08-20 NOTE — PROGRESS NOTES
08/20/24 0914   Team Meeting   Meeting Type Daily Rounds   Team Members Present   Team Members Present Physician;Nurse;   Physician Team Member Cesar   [facility] Suhas   Patient/Family Present   Patient Present No   Patient's Family Present No     Pt med/meal compliant. Visible on the unit, social with select peers. Discharge pending placement.

## 2024-08-20 NOTE — NURSING NOTE
Pt calm, cooperative, visible and social with selective peers,  Pt denies HI/SI/AVH,  compliant with medications.  No remarkabel behaviors observed.

## 2024-08-20 NOTE — NURSING NOTE
Pt visible, calm, cooperative. Pt has constricted affect, is pleasant and denies current needs. Pt denies SI/HI/AH/VH.

## 2024-08-20 NOTE — NURSING NOTE
Patient is calm and cooperative upon approach. Patient is isolative to room and self. Patient denies SI/HI/AH/VH. Patient is compliant with meds and meals.

## 2024-08-20 NOTE — PROGRESS NOTES
Progress Note - Behavioral Health     Franco Roberson 39 y.o. male MRN: [id number]   Unit/Bed#: [facility] 345-02 Encounter: [id number]    Behavior over the last 24 hours: unchanged.     Franco seen today, per staff report has been visible calm and cooperative.  He has not expressed any SI/HI/AH/VH to the staff.  He remains compliant to meds.  He remains social and with selected peers but has been participating in the activities. On the unit.    I evaluated the patient and I have reviewed his notes.  The patient offers no complaints at this time.  He states that he is doing well.  He says that he is not feeling depressed or having any symptoms of depression like hopelessness or helplessness or lack of interest.  He says that he has been participating in the activities.  The patient also reported that he has been sleeping without the use of melatonin.         Sleep: normal  Appetite: normal  Medication side effects: None reported by the patient.      Mental Status Evaluation:    Appearance:  age appropriate   Behavior:  normal   Speech:  normal rate, normal volume, normal pitch   Mood:  improved   Affect:  appropriate   Thought Process:  logical   Associations: intact associations   Thought Content:  normal   Perceptual Disturbances: none   Risk Potential: Suicidal ideation - None  Homicidal ideation - None   Sensorium:  oriented to person, place, and time/date   Memory:  recent and remote memory grossly intact   Consciousness:  alert and awake   Attention: attention span and concentration are age appropriate   Insight:  good   Judgment: good   Gait/Station: normal gait/station   Motor Activity: no abnormal movements     Vital signs in last 24 hours:    Temp:  [97 °F (36.1 °C)-97.1 °F (36.2 °C)] 97.1 °F (36.2 °C)  HR:  [67-81] 81  Resp:  [16] 16  BP: (108-122)/(62-67) 108/67    Laboratory results: I have personally reviewed all pertinent laboratory/tests results.  Most Recent Labs:   Lab Results   Component Value Date    WBC  7.01 06/27/2024    RBC 4.54 06/27/2024    HGB 14.5 06/27/2024    HCT 44.4 06/27/2024     06/27/2024    RDW 12.0 06/27/2024    NEUTROABS 4.01 06/27/2024    SODIUM 139 07/10/2024    K 3.8 07/10/2024     07/10/2024    CO2 27 07/10/2024    BUN 16 07/10/2024    CREATININE 0.92 07/10/2024    GLUC 87 07/10/2024    GLUF 87 07/10/2024    CALCIUM 9.3 07/10/2024    AST 28 07/10/2024    ALT 36 07/10/2024    ALKPHOS 66 07/10/2024    TP 7.7 07/10/2024    ALB 4.4 07/10/2024    TBILI 0.55 07/10/2024    CHOLESTEROL 159 08/09/2024    HDL 54 08/09/2024    TRIG 88 08/09/2024    LDLCALC 87 08/09/2024    NONHDLC 105 08/09/2024    [id number] 5.705 (H) 07/10/2024    FREET4 0.54 (L) 07/10/2024           Assessment & Plan   Principal Problem:    MDD (major depressive disorder), recurrent severe, without psychosis (HCC)  Active Problems:    Unspecified depressive disorder (HCC)    Medical clearance for psychiatric admission    Hyperlipidemia    Vitamin D deficiency    B12 deficiency    Hypothyroidism    Rule Out Autism spectrum disorder    Recommended Treatment:     Planned medication and treatment changes:      All current active medications have been reviewed  Encourage group therapy, milieu therapy and occupational therapy  Behavioral Health checks every 7 minutes  Current Facility-Administered Medications   Medication Dose Route Frequency Provider Last Rate    acetaminophen  650 mg Oral Q6H PRN Basiilo Brown MD      acetaminophen  650 mg Oral Q4H PRN Basilio Brown MD      acetaminophen  975 mg Oral Q6H PRN Basilio Brown MD      aluminum-magnesium hydroxide-simethicone  30 mL Oral Q4H PRN Basilio Brown MD      ARIPiprazole  5 mg Oral Daily Basilio Panda MD      Artificial Tears  1 drop Both Eyes Q3H PRN Basilio Brown MD      atorvastatin  10 mg Oral Daily With Dinner Laura Amber Kormandy, CRNP      benztropine  1 mg Intramuscular Q4H PRN Max 6/day Basilio Brown MD       benztropine  1 mg Oral Q4H PRN Max 6/day Basilio Brown MD      Cholecalciferol  2,000 Units Oral Daily Laura Clark WALLY Ashford      cyanocobalamin  1,000 mcg Oral Daily Laura Clark WALLY Ashford      Diclofenac Sodium  2 g Topical 4x Daily PRN LauraWALLY Tony      hydrOXYzine HCL  50 mg Oral Q6H PRN Max 4/day Basilio Brown MD      Or    diphenhydrAMINE  50 mg Intramuscular Q6H PRN Basilio Brown MD      famotidine  20 mg Oral BID Laura WALLY Olmos      hydrOXYzine HCL  100 mg Oral Q6H PRN Max 4/day Basilio Brown MD      Or    LORazepam  2 mg Intramuscular Q6H PRN Basilio Brown MD      hydrOXYzine HCL  25 mg Oral Q6H PRN Max 4/day Basilio Brown MD      levothyroxine  37.5 mcg Oral Early Morning Laura WALLY Olmos      melatonin  6 mg Oral HS PRN WALLY Gomez      methocarbamol  500 mg Oral Q6H PRN WALLY Baptiste      OLANZapine  5 mg Oral Q4H PRN Max 3/day Basilio Brown MD      Or    OLANZapine  2.5 mg Intramuscular Q4H PRN Max 3/day Basilio Brown MD      OLANZapine  5 mg Oral Q3H PRN Max 3/day Basilio Brown MD      Or    OLANZapine  5 mg Intramuscular Q3H PRN Max 3/day Basilio Brown MD      OLANZapine  2.5 mg Oral Q4H PRN Max 6/day Basilio Brown MD      polyethylene glycol  17 g Oral Daily PRN Basilio Brown MD      propranolol  10 mg Oral Q8H PRN Basilio Brown MD      senna-docusate sodium  1 tablet Oral Daily PRN Basilio Brown MD      sertraline  150 mg Oral Daily WALLY Gomez      traZODone  50 mg Oral HS PRN Basilio Brown MD         Risks / Benefits of Treatment:    Risks, benefits, and possible side effects of medications explained to patient and patient verbalizes understanding and agreement for treatment.    Counseling / Coordination of Care:    Patient's progress discussed with staff in treatment team meeting.  Medications, treatment  progress and treatment plan reviewed with patient.    Raj Guerrero MD 08/20/24

## 2024-08-21 PROCEDURE — 99232 SBSQ HOSP IP/OBS MODERATE 35: CPT | Performed by: PSYCHIATRY & NEUROLOGY

## 2024-08-21 RX ADMIN — LEVOTHYROXINE SODIUM 37.5 MCG: 125 TABLET ORAL at 06:16

## 2024-08-21 RX ADMIN — FAMOTIDINE 20 MG: 20 TABLET ORAL at 17:14

## 2024-08-21 RX ADMIN — ARIPIPRAZOLE 5 MG: 5 TABLET ORAL at 08:20

## 2024-08-21 RX ADMIN — CYANOCOBALAMIN TAB 1000 MCG 1000 MCG: 1000 TAB at 08:20

## 2024-08-21 RX ADMIN — ATORVASTATIN CALCIUM 10 MG: 10 TABLET, FILM COATED ORAL at 17:14

## 2024-08-21 RX ADMIN — SERTRALINE HYDROCHLORIDE 150 MG: 100 TABLET ORAL at 08:20

## 2024-08-21 RX ADMIN — FAMOTIDINE 20 MG: 20 TABLET ORAL at 08:20

## 2024-08-21 RX ADMIN — CHOLECALCIFEROL TAB 25 MCG (1000 UNIT) 2000 UNITS: 25 TAB at 08:20

## 2024-08-21 NOTE — PROGRESS NOTES
"Progress Note - Behavioral Health     Franco Roberson 39 y.o. male MRN: [id number]   Unit/Bed#: [facility] 345-02 Encounter: [id number]    Behavior over the last 24 hours: unchanged.     Franco was seen today in follow-up for continuation of care. Per staff, no acute events reproted overnight. Franco states he is doing. \"Better\" with no acute issues. We discussed his frustrations surrounding initial admission. Prior to this felt very let down and unsupported in the community after his mother passed, feeling isolated and helpless. Today he feels that with more structure he will be able to move forward and be more motivated. Franco adamantly denies suicidal/homicidal ideation in addition to thoughts of self-injury. Franco presently is contacting for safety on the unit and feels comfortable to confide in staff if thoughts arise. At time of interview, no overt psychosis elicited. Franco has been complaint with medications and tolerating without any side effects.     Sleep: normal  Appetite: normal  Medication side effects: No   ROS: no complaints, all other systems are negative    Mental Status Evaluation:    Appearance:  age appropriate, casually dressed, dressed appropriately   Behavior:  normal, pleasant, cooperative   Speech:  normal rate, normal volume, normal pitch   Mood:  euthymic   Affect:  constricted   Thought Process:  goal directed, linear   Associations: concrete associations   Thought Content:  no overt delusions   Perceptual Disturbances: no auditory hallucinations, no visual hallucinations, does not appear responding to internal stimuli   Risk Potential: Suicidal ideation - None at present  Homicidal ideation - None at present  Potential for aggression - No   Sensorium:  oriented to person, place, and time/date   Memory:  recent and remote memory grossly intact   Consciousness:  alert and awake   Attention/Concentration: attention span and concentration are age appropriate   Insight:  improving   Judgment: " improving   Gait/Station: normal gait/station   Motor Activity: no abnormal movements     Vital signs in last 24 hours:    Temp:  [97.7 °F (36.5 °C)-97.9 °F (36.6 °C)] 97.9 °F (36.6 °C)  HR:  [68-85] 85  Resp:  [16] 16  BP: (116-117)/(59-63) 117/59    Laboratory results: I have personally reviewed all pertinent laboratory/tests results    Results from the past 24 hours: No results found for this or any previous visit (from the past 24 hour(s)).    Progress Toward Goals: progressing    Assessment & Plan   Principal Problem:    MDD (major depressive disorder), recurrent severe, without psychosis (HCC)  Active Problems:    Unspecified depressive disorder (HCC)    Medical clearance for psychiatric admission    Hyperlipidemia    Vitamin D deficiency    B12 deficiency    Hypothyroidism    Rule Out Autism spectrum disorder      Recommended Treatment:     Planned medication and treatment changes:    All current active medications have been reviewed  Encourage group therapy, milieu therapy and occupational therapy  Behavioral Health checks every 7 minutes    Patient is currently at baseline. Will continue ongoing medications and therapy. He is currently awaiting placement.    Current Facility-Administered Medications   Medication Dose Route Frequency Provider Last Rate    acetaminophen  650 mg Oral Q6H PRN Basilio Brown MD      acetaminophen  650 mg Oral Q4H PRN Basilio Brown MD      acetaminophen  975 mg Oral Q6H PRN Basilio Brown MD      aluminum-magnesium hydroxide-simethicone  30 mL Oral Q4H PRN Basilio Brown MD      ARIPiprazole  5 mg Oral Daily Basilio Panda MD      Artificial Tears  1 drop Both Eyes Q3H PRN Basilio Brown MD      atorvastatin  10 mg Oral Daily With Dinner WALLY Baptiste      benztropine  1 mg Intramuscular Q4H PRN Max 6/day Basilio Brown MD      benztropine  1 mg Oral Q4H PRN Max 6/day Basilio Brown MD      Cholecalciferol  2,000 Units  Oral Daily WALLY Baptiste      cyanocobalamin  1,000 mcg Oral Daily WALLY Baptiste      Diclofenac Sodium  2 g Topical 4x Daily PRN WALLY Baptiste      hydrOXYzine HCL  50 mg Oral Q6H PRN Max 4/day Basilio Brown MD      Or    diphenhydrAMINE  50 mg Intramuscular Q6H PRN Basilio Brown MD      famotidine  20 mg Oral BID WALLY Baptiste      hydrOXYzine HCL  100 mg Oral Q6H PRN Max 4/day Basilio Brown MD      Or    LORazepam  2 mg Intramuscular Q6H PRN Basilio Brown MD      hydrOXYzine HCL  25 mg Oral Q6H PRN Max 4/day Basilio Brown MD      levothyroxine  37.5 mcg Oral Early Morning WALLY Baptiste      melatonin  6 mg Oral HS PRN WALLY Gomez      methocarbamol  500 mg Oral Q6H PRN WALYL Baptiste      OLANZapine  5 mg Oral Q4H PRN Max 3/day Basilio Brown MD      Or    OLANZapine  2.5 mg Intramuscular Q4H PRN Max 3/day Basilio Brown MD      OLANZapine  5 mg Oral Q3H PRN Max 3/day Basilio Brown MD      Or    OLANZapine  5 mg Intramuscular Q3H PRN Max 3/day Basilio Brown MD      OLANZapine  2.5 mg Oral Q4H PRN Max 6/day Basilio Brown MD      polyethylene glycol  17 g Oral Daily PRN Basilio Brown MD      propranolol  10 mg Oral Q8H PRN Basilio Brown MD      senna-docusate sodium  1 tablet Oral Daily PRN Basilio Brown MD      sertraline  150 mg Oral Daily WALLY Gomez      traZODone  50 mg Oral HS PRN Basilio Brown MD       Risks / Benefits of Treatment:    Risks, benefits, and possible side effects of medications explained to patient and patient verbalizes understanding and agreement for treatment.    Counseling / Coordination of Care:    Total floor / unit time spent today 20 minutes. Greater than 50% of total time was spent with the patient and / or family counseling and / or coordination of care. A description of counseling /  coordination of care:  Patient's progress discussed with staff in treatment team meeting.  Medications, treatment progress and treatment plan reviewed with patient.    Elizabeth Pennington PA-C 08/21/24

## 2024-08-21 NOTE — PROGRESS NOTES
08/21/24 0849   Team Meeting   Meeting Type Daily Rounds   Team Members Present   Team Members Present Physician;;Nurse   Physician Team Member Lucero   [facility] Noa   Patient/Family Present   Patient Present No   Patient's Family Present No     Pt is medication and meal compliant. Pt's discharge is pending placement. Pt is calm and cooperative.

## 2024-08-21 NOTE — NURSING NOTE
Pt resting in bed the majority of the shift. Pt denies SI/HI/AH/VH at this time but does appear depressed. Offered coloring pages and word searches but pt declined. Encouraged pt to attend groups but pt declined at this time. Med and meal compliant. Denies any unmet needs or complaints at this time.

## 2024-08-21 NOTE — PLAN OF CARE
Problem: Ineffective Coping  Goal: Identifies ineffective coping skills  Outcome: Progressing  Goal: Identifies healthy coping skills  Outcome: Progressing  Goal: Demonstrates healthy coping skills  Outcome: Progressing  Goal: Participates in unit activities  Description: Interventions:  - Provide therapeutic environment   - Provide required programming   - Redirect inappropriate behaviors   Outcome: Progressing     Problem: Risk for Self Injury/Neglect  Goal: Treatment Goal: Remain safe during length of stay, learn and adopt new coping skills, and be free of self-injurious ideation, impulses and acts at the time of discharge  Outcome: Progressing     Problem: Depression  Goal: Treatment Goal: Demonstrate behavioral control of depressive symptoms, verbalize feelings of improved mood/affect, and adopt new coping skills prior to discharge  Outcome: Progressing  Goal: Verbalize thoughts and feelings  Description: Interventions:  - Assess and re-assess patient's level of risk   - Engage patient in 1:1 interactions, daily, for a minimum of 15 minutes   - Encourage patient to express feelings, fears, frustrations, hopes   Outcome: Progressing  Goal: Refrain from harming self  Description: Interventions:  - Monitor patient closely, per order   - Supervise medication ingestion, monitor effects and side effects   Outcome: Progressing  Goal: Refrain from isolation  Description: Interventions:  - Develop a trusting relationship   - Encourage socialization   Outcome: Progressing  Goal: Refrain from self-neglect  Outcome: Progressing  Goal: Attend and participate in unit activities, including therapeutic, recreational, and educational groups  Description: Interventions:  - Provide therapeutic and educational activities daily, encourage attendance and participation, and document same in the medical record   Outcome: Progressing  Goal: Complete daily ADLs, including personal hygiene independently, as able  Description:  Interventions:  - Observe, teach, and assist patient with ADLS  -  Monitor and promote a balance of rest/activity, with adequate nutrition and elimination   Outcome: Progressing     Problem: Anxiety  Goal: Anxiety is at manageable level  Description: Interventions:  - Assess and monitor patient's anxiety level.   - Monitor for signs and symptoms (heart palpitations, chest pain, shortness of breath, headaches, nausea, feeling jumpy, restlessness, irritable, apprehensive).   - Collaborate with interdisciplinary team and initiate plan and interventions as ordered.  - Wiergate patient to unit/surroundings  - Explain treatment plan  - Encourage participation in care  - Encourage verbalization of concerns/fears  - Identify coping mechanisms  - Assist in developing anxiety-reducing skills  - Administer/offer alternative therapies  - Limit or eliminate stimulants  Outcome: Progressing     Problem: Knowledge Deficit  Goal: Patient/family/caregiver demonstrates understanding of disease process, treatment plan, medications, and discharge instructions  Description: Complete learning assessment and assess knowledge base.  Interventions:  - Provide teaching at level of understanding  - Provide teaching via preferred learning methods  Outcome: Progressing     Problem: SLEEP DISTURBANCE  Goal: Will exhibit normal sleeping pattern  Description: Interventions:  -  Assess the patients sleep pattern, noting recent changes  - Administer medication as ordered  - Decrease environmental stimuli, including noise, as appropriate during the night  - Encourage the patient to actively participate in unit groups and or exercise during the day to enhance ability to achieve adequate sleep at night  - Assess the patient, in the morning, encouraging a description of sleep experience  Outcome: Progressing

## 2024-08-21 NOTE — NURSING NOTE
Pt withdrawn this evening, cooperative and pleasant upon approach. Pt appears constricted and sad in affect. Pt denies SI/HI/AH/VH and current unmet needs. Staff availability reinforced.

## 2024-08-22 PROCEDURE — 99232 SBSQ HOSP IP/OBS MODERATE 35: CPT | Performed by: PSYCHIATRY & NEUROLOGY

## 2024-08-22 RX ADMIN — CYANOCOBALAMIN TAB 1000 MCG 1000 MCG: 1000 TAB at 08:17

## 2024-08-22 RX ADMIN — LEVOTHYROXINE SODIUM 37.5 MCG: 125 TABLET ORAL at 06:47

## 2024-08-22 RX ADMIN — FAMOTIDINE 20 MG: 20 TABLET ORAL at 08:17

## 2024-08-22 RX ADMIN — ATORVASTATIN CALCIUM 10 MG: 10 TABLET, FILM COATED ORAL at 17:30

## 2024-08-22 RX ADMIN — SERTRALINE HYDROCHLORIDE 150 MG: 100 TABLET ORAL at 08:17

## 2024-08-22 RX ADMIN — ARIPIPRAZOLE 5 MG: 5 TABLET ORAL at 08:17

## 2024-08-22 RX ADMIN — CHOLECALCIFEROL TAB 25 MCG (1000 UNIT) 2000 UNITS: 25 TAB at 08:17

## 2024-08-22 RX ADMIN — FAMOTIDINE 20 MG: 20 TABLET ORAL at 17:35

## 2024-08-22 NOTE — PROGRESS NOTES
08/22/24 0859   Team Meeting   Meeting Type Daily Rounds   Team Members Present   Team Members Present Physician;Nurse;   Physician Team Member Lucero   [facility] Noa   Patient/Family Present   Patient Present No   Patient's Family Present No     Pt is medication and meal compliant. Pt is observed in his room. Pt's discharge is pending placement.

## 2024-08-22 NOTE — NURSING NOTE
Patient is calm and cooperative upon approach. Patient is visible in patient lounge, talking to staff. Patient denies SI/HI/AH/VH. Patient is compliant with meds and meals.

## 2024-08-22 NOTE — PROGRESS NOTES
"Progress Note - Behavioral Health     Franco Roberson 39 y.o. male MRN: [id number]   Unit/Bed#: [facility] 345-02 Encounter: [id number]    Behavior over the last 24 hours: unchanged.     Franco was seen today in follow-up for continuation of care. Per staff, no acute events reported overnight. Franco states he has been doing well on the unit with daily routines and participating in treatment needs. He does agree that ongoing structure would be the most beneficial for him or would be at risk for worsening depression and suicidal thoughts if not in placement. Franco adamantly denies suicidal/homicidal ideation in addition to thoughts of self-injury. Franco presently is contacting for safety on the unit and feels comfortable to confide in staff if thoughts arise. At time of interview, no overt psychosis elicited. Franco has been complaint with medications and tolerating without any side effects.     Sleep: normal  Appetite: normal  Medication side effects: No   ROS: no complaints, all other systems are negative    Mental Status Evaluation:    Appearance:  marginal hygiene, greasy hair, casual attire   Behavior:  normal, pleasant, cooperative, calm   Speech:  normal rate, normal volume, normal pitch   Mood:  euthymic, \"No issues\"   Affect:  constricted, superficially brightens   Thought Process:  goal directed, linear   Associations: concrete associations   Thought Content:  no overt delusions   Perceptual Disturbances: no auditory hallucinations, no visual hallucinations, does not appear responding to internal stimuli   Risk Potential: Suicidal ideation - None at present  Homicidal ideation - None at present  Potential for aggression - No   Sensorium:  oriented to person, place, and time/date   Memory:  recent and remote memory grossly intact   Consciousness:  alert and awake   Attention/Concentration: attention span and concentration are age appropriate   Insight:  improving   Judgment: improving   Gait/Station: normal " gait/station   Motor Activity: no abnormal movements     Vital signs in last 24 hours:    Temp:  [97.5 °F (36.4 °C)] 97.5 °F (36.4 °C)  HR:  [69-86] 69  Resp:  [16] 16  BP: (116-120)/(66) 116/66    Laboratory results: I have personally reviewed all pertinent laboratory/tests results    Results from the past 24 hours: No results found for this or any previous visit (from the past 24 hour(s)).    Progress Toward Goals: progressing    Assessment & Plan   Principal Problem:    MDD (major depressive disorder), recurrent severe, without psychosis (HCC)  Active Problems:    Unspecified depressive disorder (HCC)    Medical clearance for psychiatric admission    Hyperlipidemia    Vitamin D deficiency    B12 deficiency    Hypothyroidism    Rule Out Autism spectrum disorder      Recommended Treatment:     Planned medication and treatment changes:    All current active medications have been reviewed  Encourage group therapy, milieu therapy and occupational therapy  Behavioral Health checks every 7 minutes    Remains at clinical baseline. Continue to await placement placement at present.     Current Facility-Administered Medications   Medication Dose Route Frequency Provider Last Rate    acetaminophen  650 mg Oral Q6H PRN Basilio Brown MD      acetaminophen  650 mg Oral Q4H PRN Basilio Brown MD      acetaminophen  975 mg Oral Q6H PRN Basilio Brown MD      aluminum-magnesium hydroxide-simethicone  30 mL Oral Q4H PRN Basilio Brown MD      ARIPiprazole  5 mg Oral Daily Basilio Panda MD      Artificial Tears  1 drop Both Eyes Q3H PRN Basilio Brown MD      atorvastatin  10 mg Oral Daily With Dinner WALLY Baptiste      benztropine  1 mg Intramuscular Q4H PRN Max 6/day Basilio Brown MD      benztropine  1 mg Oral Q4H PRN Max 6/day Basilio Brown MD      Cholecalciferol  2,000 Units Oral Daily WALLY Baptiste      cyanocobalamin  1,000 mcg Oral Daily Laura  WALLY Olmos      Diclofenac Sodium  2 g Topical 4x Daily PRN WALLY Baptiste      hydrOXYzine HCL  50 mg Oral Q6H PRN Max 4/day Basilio Brown MD      Or    diphenhydrAMINE  50 mg Intramuscular Q6H PRN Basilio Brown MD      famotidine  20 mg Oral BID WALLY Baptiste      hydrOXYzine HCL  100 mg Oral Q6H PRN Max 4/day Basilio Brown MD      Or    LORazepam  2 mg Intramuscular Q6H PRN Basilio Brown MD      hydrOXYzine HCL  25 mg Oral Q6H PRN Max 4/day Basilio Brown MD      levothyroxine  37.5 mcg Oral Early Morning WALLY Baptiste      melatonin  6 mg Oral HS PRN WALLY Gomez      methocarbamol  500 mg Oral Q6H PRN WALLY Baptiste      OLANZapine  5 mg Oral Q4H PRN Max 3/day Basilio Brown MD      Or    OLANZapine  2.5 mg Intramuscular Q4H PRN Max 3/day Basilio Brown MD      OLANZapine  5 mg Oral Q3H PRN Max 3/day Basilio Brown MD      Or    OLANZapine  5 mg Intramuscular Q3H PRN Max 3/day Basilio Brown MD      OLANZapine  2.5 mg Oral Q4H PRN Max 6/day Basilio Brown MD      polyethylene glycol  17 g Oral Daily PRN Basilio Brown MD      propranolol  10 mg Oral Q8H PRN Basilio Brown MD      senna-docusate sodium  1 tablet Oral Daily PRN Basilio Brown MD      sertraline  150 mg Oral Daily WALLY Gomez      traZODone  50 mg Oral HS PRN Basilio Brown MD       Risks / Benefits of Treatment:    Risks, benefits, and possible side effects of medications explained to patient and patient verbalizes understanding and agreement for treatment.    Counseling / Coordination of Care:    Total floor / unit time spent today 20 minutes. Greater than 50% of total time was spent with the patient and / or family counseling and / or coordination of care. A description of counseling / coordination of care:  Patient's progress discussed with staff in treatment team  meeting.  Medications, treatment progress and treatment plan reviewed with patient.    Elizabeth Pennington PA-C 08/22/24

## 2024-08-22 NOTE — PLAN OF CARE
Problem: Ineffective Coping  Goal: Identifies ineffective coping skills  Outcome: Progressing  Goal: Identifies healthy coping skills  Outcome: Progressing  Goal: Demonstrates healthy coping skills  Outcome: Progressing  Goal: Participates in unit activities  Description: Interventions:  - Provide therapeutic environment   - Provide required programming   - Redirect inappropriate behaviors   Outcome: Progressing     Problem: Depression  Goal: Verbalize thoughts and feelings  Description: Interventions:  - Assess and re-assess patient's level of risk   - Engage patient in 1:1 interactions, daily, for a minimum of 15 minutes   - Encourage patient to express feelings, fears, frustrations, hopes   Outcome: Progressing  Goal: Refrain from harming self  Description: Interventions:  - Monitor patient closely, per order   - Supervise medication ingestion, monitor effects and side effects   Outcome: Progressing  Goal: Refrain from self-neglect  Outcome: Progressing

## 2024-08-23 PROCEDURE — 99232 SBSQ HOSP IP/OBS MODERATE 35: CPT | Performed by: PSYCHIATRY & NEUROLOGY

## 2024-08-23 RX ADMIN — LEVOTHYROXINE SODIUM 37.5 MCG: 125 TABLET ORAL at 05:43

## 2024-08-23 RX ADMIN — FAMOTIDINE 20 MG: 20 TABLET ORAL at 08:55

## 2024-08-23 RX ADMIN — ATORVASTATIN CALCIUM 10 MG: 10 TABLET, FILM COATED ORAL at 17:13

## 2024-08-23 RX ADMIN — ARIPIPRAZOLE 5 MG: 5 TABLET ORAL at 08:55

## 2024-08-23 RX ADMIN — SERTRALINE HYDROCHLORIDE 150 MG: 100 TABLET ORAL at 08:55

## 2024-08-23 RX ADMIN — CYANOCOBALAMIN TAB 1000 MCG 1000 MCG: 1000 TAB at 08:55

## 2024-08-23 RX ADMIN — FAMOTIDINE 20 MG: 20 TABLET ORAL at 17:13

## 2024-08-23 RX ADMIN — CHOLECALCIFEROL TAB 25 MCG (1000 UNIT) 2000 UNITS: 25 TAB at 08:55

## 2024-08-23 NOTE — NURSING NOTE
Patient is calm and cooperative upon approach. Patient is isolative to room. Patient denies SI/HI/AH/VH. Patient compliant with unit and meals.

## 2024-08-23 NOTE — PROGRESS NOTES
"Progress Note - Behavioral Health     Franco Roberson 39 y.o. male MRN: [id number]   Unit/Bed#: U 345-02 Encounter: [id number]    Behavior over the last 24 hours: unchanged.     Franco was seen today in follow-up for continuation of care. Per staff, no acute issues reported overnight. Franco states he is doing \"okay\" without any new complaints. He states he remains patient with placement but hopes he can be discharged soon. Otherwise no change in symptoms or worsening suicidal thoughts. Franco adamantly denies suicidal/homicidal ideation in addition to thoughts of self-injury. Franco presently is contacting for safety on the unit and feels comfortable to confide in staff if thoughts arise. At time of interview, no overt psychosis elicited. Franco has been complaint with medications and tolerating without any side effects.     Sleep: normal  Appetite: normal  Medication side effects: No   ROS: no complaints, all other systems are negative    Mental Status Evaluation:    Appearance:  marginal hygiene, appears stated age, dressed in regular attire   Behavior:  normal, pleasant, cooperative   Speech:  normal rate, normal volume, normal pitch   Mood:  euthymic, \"good\"   Affect:  constricted, but brightens on approach   Thought Process:  goal directed, linear   Associations: concrete associations   Thought Content:  no overt delusions   Perceptual Disturbances: no auditory hallucinations, no visual hallucinations, does not appear responding to internal stimuli   Risk Potential: Suicidal ideation - None at present  Homicidal ideation - None at present  Potential for aggression - No   Sensorium:  oriented to person, place, and time/date   Memory:  recent and remote memory grossly intact   Consciousness:  alert and awake   Attention/Concentration: attention span and concentration are age appropriate   Insight:  improving   Judgment: improving   Gait/Station: normal gait/station   Motor Activity: no abnormal movements     Vital " signs in last 24 hours:    Temp:  [97.2 °F (36.2 °C)-97.5 °F (36.4 °C)] 97.5 °F (36.4 °C)  HR:  [67-75] 67  Resp:  [16] 16  BP: ()/(69-71) 97/69    Laboratory results: I have personally reviewed all pertinent laboratory/tests results    Results from the past 24 hours: No results found for this or any previous visit (from the past 24 hour(s)).    Progress Toward Goals: progressing    Assessment & Plan   Principal Problem:    MDD (major depressive disorder), recurrent severe, without psychosis (HCC)  Active Problems:    Unspecified depressive disorder (HCC)    Medical clearance for psychiatric admission    Hyperlipidemia    Vitamin D deficiency    B12 deficiency    Hypothyroidism    Rule Out Autism spectrum disorder      Recommended Treatment:     Planned medication and treatment changes:    All current active medications have been reviewed  Encourage group therapy, milieu therapy and occupational therapy  Behavioral Health checks every 7 minutes    Continues to remain at baseline. Reports no change in mood symptoms or reoccurrence of suicidal thoughts. Currently awaiting placement.     Current Facility-Administered Medications   Medication Dose Route Frequency Provider Last Rate    acetaminophen  650 mg Oral Q6H PRN Basilio Brown MD      acetaminophen  650 mg Oral Q4H PRN Basilio Brown MD      acetaminophen  975 mg Oral Q6H PRN Basilio Brown MD      aluminum-magnesium hydroxide-simethicone  30 mL Oral Q4H PRN Basilio Brown MD      ARIPiprazole  5 mg Oral Daily Basilio Panda MD      Artificial Tears  1 drop Both Eyes Q3H PRN Basilio Brown MD      atorvastatin  10 mg Oral Daily With Dinner WALLY Baptiste      benztropine  1 mg Intramuscular Q4H PRN Max 6/day Basilio Brown MD      benztropine  1 mg Oral Q4H PRN Max 6/day Basilio Brown MD      Cholecalciferol  2,000 Units Oral Daily WALLY Baptiste      cyanocobalamin  1,000 mcg Oral Daily  WALLY Baptiste      Diclofenac Sodium  2 g Topical 4x Daily PRN WALLY Baptiste      hydrOXYzine HCL  50 mg Oral Q6H PRN Max 4/day Basilio Brown MD      Or    diphenhydrAMINE  50 mg Intramuscular Q6H PRN Basilio Brown MD      famotidine  20 mg Oral BID WALLY Baptiste      hydrOXYzine HCL  100 mg Oral Q6H PRN Max 4/day Basilio Brown MD      Or    LORazepam  2 mg Intramuscular Q6H PRN Basilio Brown MD      hydrOXYzine HCL  25 mg Oral Q6H PRN Max 4/day Basilio Brown MD      levothyroxine  37.5 mcg Oral Early Morning WALLY Baptiste      melatonin  6 mg Oral HS PRN WALLY Gomez      methocarbamol  500 mg Oral Q6H PRN WALLY Baptiste      OLANZapine  5 mg Oral Q4H PRN Max 3/day Basilio Brown MD      Or    OLANZapine  2.5 mg Intramuscular Q4H PRN Max 3/day Basilio Brown MD      OLANZapine  5 mg Oral Q3H PRN Max 3/day Basilio Brown MD      Or    OLANZapine  5 mg Intramuscular Q3H PRN Max 3/day Basilio Brown MD      OLANZapine  2.5 mg Oral Q4H PRN Max 6/day Basilio Brown MD      polyethylene glycol  17 g Oral Daily PRN Basilio Brown MD      propranolol  10 mg Oral Q8H PRN Basilio Brown MD      senna-docusate sodium  1 tablet Oral Daily PRN Basilio Brown MD      sertraline  150 mg Oral Daily WALLY Gomez      traZODone  50 mg Oral HS PRN Basilio Brown MD       Risks / Benefits of Treatment:    Risks, benefits, and possible side effects of medications explained to patient and patient verbalizes understanding and agreement for treatment.    Counseling / Coordination of Care:    Total floor / unit time spent today 20 minutes. Greater than 50% of total time was spent with the patient and / or family counseling and / or coordination of care. A description of counseling / coordination of care:  Patient's progress discussed with staff in treatment team  meeting.  Medications, treatment progress and treatment plan reviewed with patient.    Elizabeth Pennington PA-C 08/23/24

## 2024-08-23 NOTE — PLAN OF CARE
Problem: Ineffective Coping  Goal: Identifies ineffective coping skills  Outcome: Progressing  Goal: Identifies healthy coping skills  Outcome: Progressing  Goal: Demonstrates healthy coping skills  Outcome: Progressing  Goal: Participates in unit activities  Description: Interventions:  - Provide therapeutic environment   - Provide required programming   - Redirect inappropriate behaviors   Outcome: Progressing     Problem: Risk for Self Injury/Neglect  Goal: Treatment Goal: Remain safe during length of stay, learn and adopt new coping skills, and be free of self-injurious ideation, impulses and acts at the time of discharge  Outcome: Progressing     Problem: Depression  Goal: Treatment Goal: Demonstrate behavioral control of depressive symptoms, verbalize feelings of improved mood/affect, and adopt new coping skills prior to discharge  Outcome: Progressing  Goal: Verbalize thoughts and feelings  Description: Interventions:  - Assess and re-assess patient's level of risk   - Engage patient in 1:1 interactions, daily, for a minimum of 15 minutes   - Encourage patient to express feelings, fears, frustrations, hopes   Outcome: Progressing  Goal: Refrain from harming self  Description: Interventions:  - Monitor patient closely, per order   - Supervise medication ingestion, monitor effects and side effects   Outcome: Progressing  Goal: Refrain from isolation  Description: Interventions:  - Develop a trusting relationship   - Encourage socialization   Outcome: Progressing  Goal: Refrain from self-neglect  Outcome: Progressing  Goal: Attend and participate in unit activities, including therapeutic, recreational, and educational groups  Description: Interventions:  - Provide therapeutic and educational activities daily, encourage attendance and participation, and document same in the medical record   Outcome: Progressing  Goal: Complete daily ADLs, including personal hygiene independently, as able  Description:  Interventions:  - Observe, teach, and assist patient with ADLS  -  Monitor and promote a balance of rest/activity, with adequate nutrition and elimination   Outcome: Progressing     Problem: Anxiety  Goal: Anxiety is at manageable level  Description: Interventions:  - Assess and monitor patient's anxiety level.   - Monitor for signs and symptoms (heart palpitations, chest pain, shortness of breath, headaches, nausea, feeling jumpy, restlessness, irritable, apprehensive).   - Collaborate with interdisciplinary team and initiate plan and interventions as ordered.  - [place] patient to unit/surroundings  - Explain treatment plan  - Encourage participation in care  - Encourage verbalization of concerns/fears  - Identify coping mechanisms  - Assist in developing anxiety-reducing skills  - Administer/offer alternative therapies  - Limit or eliminate stimulants  Outcome: Progressing

## 2024-08-23 NOTE — NURSING NOTE
Pt visible on unit in milieu and dayroom. He is social and pleasant with peers and staff. Medication and meal complaint. He denies SI/HI/AH/VH. No PRN's requested. Continuous rounding maintained.

## 2024-08-24 PROCEDURE — 99232 SBSQ HOSP IP/OBS MODERATE 35: CPT | Performed by: PSYCHIATRY & NEUROLOGY

## 2024-08-24 RX ADMIN — FAMOTIDINE 20 MG: 20 TABLET ORAL at 17:31

## 2024-08-24 RX ADMIN — LEVOTHYROXINE SODIUM 37.5 MCG: 125 TABLET ORAL at 05:17

## 2024-08-24 RX ADMIN — SERTRALINE HYDROCHLORIDE 150 MG: 100 TABLET ORAL at 09:37

## 2024-08-24 RX ADMIN — ATORVASTATIN CALCIUM 10 MG: 10 TABLET, FILM COATED ORAL at 17:31

## 2024-08-24 RX ADMIN — ARIPIPRAZOLE 5 MG: 5 TABLET ORAL at 09:37

## 2024-08-24 RX ADMIN — CHOLECALCIFEROL TAB 25 MCG (1000 UNIT) 2000 UNITS: 25 TAB at 09:37

## 2024-08-24 RX ADMIN — CYANOCOBALAMIN TAB 1000 MCG 1000 MCG: 1000 TAB at 09:37

## 2024-08-24 RX ADMIN — FAMOTIDINE 20 MG: 20 TABLET ORAL at 09:37

## 2024-08-24 NOTE — NURSING NOTE
Patient is in the milieu walking in the hallways by himself. He is responsive and pleasant when staff initiate interaction but otherwise keeps to himself.  He denies S.I.H.I.A/H V/H

## 2024-08-24 NOTE — PROGRESS NOTES
"Progress Note - Behavioral Health   Franco Roberson 39 y.o. male MRN: [id number]  Unit/Bed#: U 345-02 Encounter: [id number]    Assessment   Principal Problem:    MDD (major depressive disorder), recurrent severe, without psychosis (HCC)  Active Problems:    Unspecified depressive disorder (HCC)    Medical clearance for psychiatric admission    Hyperlipidemia    Vitamin D deficiency    B12 deficiency    Hypothyroidism    Rule Out Autism spectrum disorder      Plan    No psychopharmacologic changes made at this time. Will continue to assess daily for further optimization.  Continue current psychiatric medications.  Continue to assess for adverse medication side effects.  Continue medication management per [facility] ([facility]) as indicated.  Continue to encourage group therapy, milieu therapy, individual therapy, and occupational therapy.  Continue to engage CM/SW to assist with collateral, disposition planning, and the implementation of an individualized, patient-centered plan of care.  Continue frequent safety checks and vitals per unit protocol.      Risks, benefits and possible side effects of Medications:   Risks, benefits, and possible side effects of medications have been explained to the patient, who verbalizes understanding    Legal status: 201 voluntary commitment  Disposition: TBD    ------------------------------------------------------------------------------------------------------  Subjective:  Per nursing report: Patient has been calm and cooperative, visible in patient lounge, talking to staff, denying SI/HI/AVH, isolative to his room at times, med and meal compliant. PRNs in last 24 hours: None    Franco was evaluated this morning in his room for continuity of care. Patient was pleasant, cooperative with interview. Patient reports mood today as \" good.\"  Denies feelings of depression.  States that he has a little anxiety.  He reports that he woke up a few times to use the restroom in the " "middle the night, but was able to fall back to sleep.  Denies any change in appetite.    At the time of today's evaluation, the patient denies AVH, active SI, passive SI, thoughts to self harm, and HI.       Progress Toward Goals: progressing gradually    Psychiatric Review of Systems:   Behavior over the last 24 hours:  unchanged  Sleep: normal  Appetite:  normal  Medication side effects:No   ROS:all other systems are negative.   Last bowel movement: 8/23       Vital Signs in last 24 hours:  Reviewed, within normal limits  Temp:  [97 °F (36.1 °C)-97.7 °F (36.5 °C)] 97 °F (36.1 °C)  HR:  [67-68] 67  Resp:  [16] 16  BP: (113-114)/(65-72) 113/72    Laboratory Results:    I have personally reviewed all pertinent laboratory/tests results  Most Recent Labs:   Lab Results   Component Value Date    WBC 7.01 06/27/2024    RBC 4.54 06/27/2024    HGB 14.5 06/27/2024    HCT 44.4 06/27/2024     06/27/2024    RDW 12.0 06/27/2024    NEUTROABS 4.01 06/27/2024    SODIUM 139 07/10/2024    K 3.8 07/10/2024     07/10/2024    CO2 27 07/10/2024    BUN 16 07/10/2024    CREATININE 0.92 07/10/2024    GLUC 87 07/10/2024    CALCIUM 9.3 07/10/2024    AST 28 07/10/2024    ALT 36 07/10/2024    ALKPHOS 66 07/10/2024    TP 7.7 07/10/2024    ALB 4.4 07/10/2024    TBILI 0.55 07/10/2024    CHOLESTEROL 159 08/09/2024    HDL 54 08/09/2024    TRIG 88 08/09/2024    LDLCALC 87 08/09/2024    NONHDLC 105 08/09/2024    FBK4TPXWPNTG 5.705 (H) 07/10/2024    FREET4 0.54 (L) 07/10/2024    SYPHILISAB Non-reactive 05/15/2024       Mental Status Evaluation:  Appearance:  age appropriate, casually dressed, marginal hygiene, looks stated age, overweight, overtly appearing  male   Behavior:  pleasant, cooperative, calm, fair eye contact   Speech:  normal rate and volume   Mood:  \"Good\"   Affect:  Euthymic, mood-congruent, smiling appropriately   Thought Process:  organized, linear, concrete   Thought Content:  no overt delusions   Perceptual " Disturbances: denies auditory or visual hallucinations when asked, does not appear responding to internal stimuli   Risk Potential: Suicidal ideation - None at present  Homicidal ideation - None at present  Potential for aggression - Not at present   Sensorium:  oriented to person, place, and time/date   Memory:  recent and remote memory grossly intact   Consciousness:  alert and awake   Attention/Concentration: attention span and concentration are age appropriate   Insight:  limited   Judgment: limited   Gait/Station: in bed   Motor Activity: no abnormal movements       Behavioral Health Medications: All current active meds have been reviewed. Changes as in plan section above.     Current Medications: all current active meds have been reviewed and continue current psychiatric medications.  Current Facility-Administered Medications   Medication Dose Route Frequency Provider Last Rate    acetaminophen  650 mg Oral Q6H PRN Basilio Brown MD      acetaminophen  650 mg Oral Q4H PRN Basilio rBown MD      acetaminophen  975 mg Oral Q6H PRN Basilio Brown MD      aluminum-magnesium hydroxide-simethicone  30 mL Oral Q4H PRN Basilio Brown MD      ARIPiprazole  5 mg Oral Daily Basilio Panda MD      Artificial Tears  1 drop Both Eyes Q3H PRN Basilio Brown MD      atorvastatin  10 mg Oral Daily With Dinner WALLY Baptiste      benztropine  1 mg Intramuscular Q4H PRN Max 6/day Basilio Brown MD      benztropine  1 mg Oral Q4H PRN Max 6/day Basilio Brown MD      Cholecalciferol  2,000 Units Oral Daily WALLY Baptiste      cyanocobalamin  1,000 mcg Oral Daily WALLY Baptiste      Diclofenac Sodium  2 g Topical 4x Daily PRN WALLY Baptiste      hydrOXYzine HCL  50 mg Oral Q6H PRN Max 4/day Basilio Brown MD      Or    diphenhydrAMINE  50 mg Intramuscular Q6H PRN Basilio Brown MD      famotidine  20 mg Oral BID Laura Clark  WALLY [place]ford      hydrOXYzine HCL  100 mg Oral Q6H PRN Max 4/day Basilio Brown MD      Or    LORazepam  2 mg Intramuscular Q6H PRN Basilio Brown MD      hydrOXYzine HCL  25 mg Oral Q6H PRN Max 4/day Basilio Brown MD      levothyroxine  37.5 mcg Oral Early Morning Laura WALLY Olmos      melatonin  6 mg Oral HS PRN WALLY Gomez      methocarbamol  500 mg Oral Q6H PRN Laura RomeWALLY Jacobson      OLANZapine  5 mg Oral Q4H PRN Max 3/day Basilio Brown MD      Or    OLANZapine  2.5 mg Intramuscular Q4H PRN Max 3/day Basilio Brown MD      OLANZapine  5 mg Oral Q3H PRN Max 3/day Basilio Brown MD      Or    OLANZapine  5 mg Intramuscular Q3H PRN Max 3/day Basilio Brown MD      OLANZapine  2.5 mg Oral Q4H PRN Max 6/day Basilio Brown MD      polyethylene glycol  17 g Oral Daily PRN Basilio Brown MD      propranolol  10 mg Oral Q8H PRN Basilio Brown MD      senna-docusate sodium  1 tablet Oral Daily PRN Basilio Brown MD      sertraline  150 mg Oral Daily WALLY Gomez      traZODone  50 mg Oral HS PRN Basilio Brown MD           Counseling / Coordination of Care:  Patient's progress discussed with staff in daily treatment team meeting.    Crys Echevarria DO 08/24/24  Psychiatry Resident, PGY- II  [facility]  Department of Psychiatry and Behavioral Health      This note was not shared with the patient due to reasonable likelihood of causing patient harm.    This note has been constructed using a voice recognition system (Dragon). As, a result, there may be translation, syntax, or grammatical errors.  Grammatical, translation, syntax errors, random word insertions, spelling mistakes, and incomplete sentences may be an occasional consequence of this system secondary to software limitations with voice recognition, ambient noise, and hardware issues. If you have any questions or concerns about  the content, text, or information contained within the body of this dictation, please contact the provider for clarification.

## 2024-08-24 NOTE — NURSING NOTE
Patient has been visible on the unit, pleasant, and calm.  He is med/meal compliant and denies SI/HI and A/V hallucinations.  Patient ADL's are poor.  He is waiting on placement

## 2024-08-25 PROCEDURE — 99232 SBSQ HOSP IP/OBS MODERATE 35: CPT | Performed by: PSYCHIATRY & NEUROLOGY

## 2024-08-25 RX ADMIN — CHOLECALCIFEROL TAB 25 MCG (1000 UNIT) 2000 UNITS: 25 TAB at 09:21

## 2024-08-25 RX ADMIN — ARIPIPRAZOLE 5 MG: 5 TABLET ORAL at 09:21

## 2024-08-25 RX ADMIN — FAMOTIDINE 20 MG: 20 TABLET ORAL at 09:21

## 2024-08-25 RX ADMIN — FAMOTIDINE 20 MG: 20 TABLET ORAL at 17:08

## 2024-08-25 RX ADMIN — CYANOCOBALAMIN TAB 1000 MCG 1000 MCG: 1000 TAB at 09:21

## 2024-08-25 RX ADMIN — ATORVASTATIN CALCIUM 10 MG: 10 TABLET, FILM COATED ORAL at 17:08

## 2024-08-25 RX ADMIN — SERTRALINE HYDROCHLORIDE 150 MG: 100 TABLET ORAL at 09:21

## 2024-08-25 RX ADMIN — LEVOTHYROXINE SODIUM 37.5 MCG: 125 TABLET ORAL at 06:25

## 2024-08-25 NOTE — NURSING NOTE
Patient has been in the milieu at times - walking in the [place] or coming for needs to staff. He does not receive any medication at  but when asked he denies S.I.H.I.A/H V/H. He is pleasant with anyone he interacts with but largely he is keeping to himself this evening.

## 2024-08-25 NOTE — NURSING NOTE
Patient is visible, walking the halls.  He is pleasant and calm.  Patient denies all signs and symptoms.

## 2024-08-25 NOTE — PLAN OF CARE
Problem: Risk for Self Injury/Neglect  Goal: Treatment Goal: Remain safe during length of stay, learn and adopt new coping skills, and be free of self-injurious ideation, impulses and acts at the time of discharge  Outcome: Progressing     Problem: Ineffective Coping  Goal: Identifies ineffective coping skills  Outcome: Progressing  Goal: Identifies healthy coping skills  Outcome: Progressing  Goal: Demonstrates healthy coping skills  Outcome: Progressing  Goal: Participates in unit activities  Description: Interventions:  - Provide therapeutic environment   - Provide required programming   - Redirect inappropriate behaviors   Outcome: Progressing

## 2024-08-25 NOTE — PROGRESS NOTES
"Progress Note - Behavioral Health   Franco Roberson 39 y.o. male MRN: [id number]  Unit/Bed#: U 345-02 Encounter: [id number]    Assessment   Principal Problem:    MDD (major depressive disorder), recurrent severe, without psychosis (HCC)  Active Problems:    Unspecified depressive disorder (HCC)    Medical clearance for psychiatric admission    Hyperlipidemia    Vitamin D deficiency    B12 deficiency    Hypothyroidism    Rule Out Autism spectrum disorder      Plan    No psychopharmacologic changes made at this time. Will continue to assess daily for further optimization.  Continue current psychiatric medications.  Continue to assess for adverse medication side effects.  Continue medication management per [facility] ([facility]) as indicated.  Continue to encourage group therapy, milieu therapy, individual therapy, and occupational therapy.  Continue to engage CM/SW to assist with collateral, disposition planning, and the implementation of an individualized, patient-centered plan of care.  Continue frequent safety checks and vitals per unit protocol.      Risks, benefits and possible side effects of Medications:   Risks, benefits, and possible side effects of medications have been explained to the patient, who verbalizes understanding    Legal status: 201 voluntary commitment  Disposition: TBD    ------------------------------------------------------------------------------------------------------  Subjective:  Per nursing report: Yesterday, Franco was visible in the unit, pleasant, calm, denying SI/HI/AVH, ADLs were poor, med and meal compliant. PRNs in last 24 hours: None    Franco was evaluated this morning in his room for continuity of care. Patient was calm, cooperative with interview. Patient reports mood today as \" pretty good.\"  He states that he is not feeling anxious or depressed today.    At the time of today's evaluation, the patient denied AVH, active SI, passive SI, thoughts to self harm, and " "HI.       Progress Toward Goals: progressing slowly    Psychiatric Review of Systems:   Behavior over the last 24 hours:  unchanged  Sleep: normal  Appetite:  normal  Medication side effects:No   ROS:no complaints, all other systems are negative.   Last bowel movement: yesterday, 8/24       Vital Signs in last 24 hours:  Reviewed, within normal limits  Temp:  [97 °F (36.1 °C)-97.1 °F (36.2 °C)] 97 °F (36.1 °C)  HR:  [70-77] 70  Resp:  [16] 16  BP: (125-128)/(64-70) 125/64    Laboratory Results:    I have personally reviewed all pertinent laboratory/tests results  Most Recent Labs:   Lab Results   Component Value Date    WBC 7.01 06/27/2024    RBC 4.54 06/27/2024    HGB 14.5 06/27/2024    HCT 44.4 06/27/2024     06/27/2024    RDW 12.0 06/27/2024    NEUTROABS 4.01 06/27/2024    SODIUM 139 07/10/2024    K 3.8 07/10/2024     07/10/2024    CO2 27 07/10/2024    BUN 16 07/10/2024    CREATININE 0.92 07/10/2024    GLUC 87 07/10/2024    CALCIUM 9.3 07/10/2024    AST 28 07/10/2024    ALT 36 07/10/2024    ALKPHOS 66 07/10/2024    TP 7.7 07/10/2024    ALB 4.4 07/10/2024    TBILI 0.55 07/10/2024    CHOLESTEROL 159 08/09/2024    HDL 54 08/09/2024    TRIG 88 08/09/2024    LDLCALC 87 08/09/2024    NONHDLC 105 08/09/2024    KOW5PVWXGEHP 5.705 (H) 07/10/2024    FREET4 0.54 (L) 07/10/2024    SYPHILISAB Non-reactive 05/15/2024       Mental Status Evaluation:  Appearance:  age appropriate, casually dressed, marginal hygiene, looks stated age, overweight, overtly appearing  male   Behavior:  pleasant, cooperative, calm, fair eye contact   Speech:  normal rate and volume   Mood:  \"Pretty good\"   Affect:  Euthymic, mood congruent, smiling appropriately   Thought Process:  Organized, linear, concrete   Thought Content:  no overt delusions   Perceptual Disturbances: denies auditory or visual hallucinations when asked, does not appear responding to internal stimuli   Risk Potential: Suicidal ideation - None at " present  Homicidal ideation - None at present  Potential for aggression - Not at present   Sensorium:  oriented to person and situation   Memory:  recent and remote memory grossly intact   Consciousness:  alert and awake   Attention/Concentration: attention span and concentration are age appropriate   Insight:  limited   Judgment: limited   Gait/Station: in bed   Motor Activity: no abnormal movements       Behavioral Health Medications: All current active meds have been reviewed. Changes as in plan section above.     Current Medications: all current active meds have been reviewed and continue current psychiatric medications.  Current Facility-Administered Medications   Medication Dose Route Frequency Provider Last Rate    acetaminophen  650 mg Oral Q6H PRN Basilio Brown MD      acetaminophen  650 mg Oral Q4H PRN Basilio Brown MD      acetaminophen  975 mg Oral Q6H PRN Basilio Brown MD      aluminum-magnesium hydroxide-simethicone  30 mL Oral Q4H PRN Basilio Brown MD      ARIPiprazole  5 mg Oral Daily Basilio Panda MD      Artificial Tears  1 drop Both Eyes Q3H PRN Basilio Brown MD      atorvastatin  10 mg Oral Daily With Dinner WALLY Baptiste      benztropine  1 mg Intramuscular Q4H PRN Max 6/day Basilio Brown MD      benztropine  1 mg Oral Q4H PRN Max 6/day Basilio Brown MD      Cholecalciferol  2,000 Units Oral Daily WALLY Baptiste      cyanocobalamin  1,000 mcg Oral Daily WALLY Baptiste      Diclofenac Sodium  2 g Topical 4x Daily PRN WALLY Baptiste      hydrOXYzine HCL  50 mg Oral Q6H PRN Max 4/day Basilio Brown MD      Or    diphenhydrAMINE  50 mg Intramuscular Q6H PRN Basilio Brown MD      famotidine  20 mg Oral BID WALLY Baptiste      hydrOXYzine HCL  100 mg Oral Q6H PRN Max 4/day Basilio Brown MD      Or    LORazepam  2 mg Intramuscular Q6H PRN Basilio Brown MD       hydrOXYzine HCL  25 mg Oral Q6H PRN Max 4/day Basilio Brown MD      levothyroxine  37.5 mcg Oral Early Morning WALLY Baptiste      melatonin  6 mg Oral HS PRN WALLY Gomez      methocarbamol  500 mg Oral Q6H PRN WALLY Baptiste      OLANZapine  5 mg Oral Q4H PRN Max 3/day Basilio Brown MD      Or    OLANZapine  2.5 mg Intramuscular Q4H PRN Max 3/day Basilio Brown MD      OLANZapine  5 mg Oral Q3H PRN Max 3/day Basilio Brown MD      Or    OLANZapine  5 mg Intramuscular Q3H PRN Max 3/day Basilio Brown MD      OLANZapine  2.5 mg Oral Q4H PRN Max 6/day Basilio Brown MD      polyethylene glycol  17 g Oral Daily PRN Basilio Brown MD      propranolol  10 mg Oral Q8H PRN Basilio Brown MD      senna-docusate sodium  1 tablet Oral Daily PRN Basilio Brown MD      sertraline  150 mg Oral Daily WALLY Gomez      traZODone  50 mg Oral HS PRN Basilio Brown MD           Counseling / Coordination of Care:  Patient's progress discussed with staff in daily treatment team meeting.    Crys Echevarria DO 08/25/24  Psychiatry Resident, PGY- II  [facility]  Department of Psychiatry and Behavioral Health      This note was not shared with the patient due to reasonable likelihood of causing patient harm.    This note has been constructed using a voice recognition system (Dragon). As, a result, there may be translation, syntax, or grammatical errors.  Grammatical, translation, syntax errors, random word insertions, spelling mistakes, and incomplete sentences may be an occasional consequence of this system secondary to software limitations with voice recognition, ambient noise, and hardware issues. If you have any questions or concerns about the content, text, or information contained within the body of this dictation, please contact the provider for clarification.

## 2024-08-26 PROCEDURE — 99232 SBSQ HOSP IP/OBS MODERATE 35: CPT | Performed by: PSYCHIATRY & NEUROLOGY

## 2024-08-26 RX ADMIN — SERTRALINE HYDROCHLORIDE 150 MG: 100 TABLET ORAL at 08:24

## 2024-08-26 RX ADMIN — ATORVASTATIN CALCIUM 10 MG: 10 TABLET, FILM COATED ORAL at 17:26

## 2024-08-26 RX ADMIN — ARIPIPRAZOLE 5 MG: 5 TABLET ORAL at 08:24

## 2024-08-26 RX ADMIN — CHOLECALCIFEROL TAB 25 MCG (1000 UNIT) 2000 UNITS: 25 TAB at 08:24

## 2024-08-26 RX ADMIN — CYANOCOBALAMIN TAB 1000 MCG 1000 MCG: 1000 TAB at 08:24

## 2024-08-26 RX ADMIN — FAMOTIDINE 20 MG: 20 TABLET ORAL at 08:24

## 2024-08-26 RX ADMIN — LEVOTHYROXINE SODIUM 37.5 MCG: 125 TABLET ORAL at 06:36

## 2024-08-26 RX ADMIN — FAMOTIDINE 20 MG: 20 TABLET ORAL at 17:26

## 2024-08-26 NOTE — PROGRESS NOTES
08/26/24 1146   Team Meeting   Meeting Type Daily Rounds   Team Members Present   Team Members Present Nurse;Physician;   Physician Team Member Lucero   [facility] Noa   Patient/Family Present   Patient Present No   Patient's Family Present No     Pt is selectively social with peers but mainly seclusive to his room. Pt participates in group therapy. Pt is medication and meal compliant. Pt currently denies SI/HI/AVH.

## 2024-08-26 NOTE — NURSING NOTE
Patient is quiet and visible in the milieu for needs. He has been in his room otherwise this evening. He is pleasant ,unassuming and makes no demands on anyone. He denies all symptoms.

## 2024-08-26 NOTE — PLAN OF CARE
Problem: Ineffective Coping  Goal: Identifies ineffective coping skills  Outcome: Progressing  Goal: Identifies healthy coping skills  Outcome: Progressing  Goal: Demonstrates healthy coping skills  Outcome: Progressing  Goal: Participates in unit activities  Description: Interventions:  - Provide therapeutic environment   - Provide required programming   - Redirect inappropriate behaviors   Outcome: Progressing     Problem: Risk for Self Injury/Neglect  Goal: Treatment Goal: Remain safe during length of stay, learn and adopt new coping skills, and be free of self-injurious ideation, impulses and acts at the time of discharge  Outcome: Progressing     Problem: Depression  Goal: Treatment Goal: Demonstrate behavioral control of depressive symptoms, verbalize feelings of improved mood/affect, and adopt new coping skills prior to discharge  Outcome: Progressing  Goal: Verbalize thoughts and feelings  Description: Interventions:  - Assess and re-assess patient's level of risk   - Engage patient in 1:1 interactions, daily, for a minimum of 15 minutes   - Encourage patient to express feelings, fears, frustrations, hopes   Outcome: Progressing  Goal: Refrain from harming self  Description: Interventions:  - Monitor patient closely, per order   - Supervise medication ingestion, monitor effects and side effects   Outcome: Progressing  Goal: Refrain from isolation  Description: Interventions:  - Develop a trusting relationship   - Encourage socialization   Outcome: Progressing  Goal: Refrain from self-neglect  Outcome: Progressing  Goal: Attend and participate in unit activities, including therapeutic, recreational, and educational groups  Description: Interventions:  - Provide therapeutic and educational activities daily, encourage attendance and participation, and document same in the medical record   Outcome: Progressing  Goal: Complete daily ADLs, including personal hygiene independently, as able  Description:  Interventions:  - Observe, teach, and assist patient with ADLS  -  Monitor and promote a balance of rest/activity, with adequate nutrition and elimination   Outcome: Progressing     Problem: Anxiety  Goal: Anxiety is at manageable level  Description: Interventions:  - Assess and monitor patient's anxiety level.   - Monitor for signs and symptoms (heart palpitations, chest pain, shortness of breath, headaches, nausea, feeling jumpy, restlessness, irritable, apprehensive).   - Collaborate with interdisciplinary team and initiate plan and interventions as ordered.  - [place] patient to unit/surroundings  - Explain treatment plan  - Encourage participation in care  - Encourage verbalization of concerns/fears  - Identify coping mechanisms  - Assist in developing anxiety-reducing skills  - Administer/offer alternative therapies  - Limit or eliminate stimulants  Outcome: Progressing     Problem: DISCHARGE PLANNING - CARE MANAGEMENT  Goal: Discharge to post-acute care or home with appropriate resources  Description: INTERVENTIONS:  - Conduct assessment to determine patient/family and health care team treatment goals, and need for post-acute services based on payer coverage, community resources, and patient preferences, and barriers to discharge  - Address psychosocial, clinical, and financial barriers to discharge as identified in assessment in conjunction with the patient/family and health care team  - Arrange appropriate level of post-acute services according to patient’s   needs and preference and payer coverage in collaboration with the physician and health care team  - Communicate with and update the patient/family, physician, and health care team regarding progress on the discharge plan  - Arrange appropriate transportation to post-acute venues  Outcome: Progressing     Problem: Knowledge Deficit  Goal: Patient/family/caregiver demonstrates understanding of disease process, treatment plan, medications, and discharge  instructions  Description: Complete learning assessment and assess knowledge base.  Interventions:  - Provide teaching at level of understanding  - Provide teaching via preferred learning methods  Outcome: Progressing     Problem: SLEEP DISTURBANCE  Goal: Will exhibit normal sleeping pattern  Description: Interventions:  -  Assess the patients sleep pattern, noting recent changes  - Administer medication as ordered  - Decrease environmental stimuli, including noise, as appropriate during the night  - Encourage the patient to actively participate in unit groups and or exercise during the day to enhance ability to achieve adequate sleep at night  - Assess the patient, in the morning, encouraging a description of sleep experience  Outcome: Progressing

## 2024-08-26 NOTE — NURSING NOTE
Pt visible on the unit in milieu and dayroom. He is observed quietly walking the hallway, with occasional peer interaction. Medication and meal compliant. He denies SI/HI/AH/VH, but appears sad. Pt attended group today. No PRN's requested. Continuous rounding maintained. No unmet needs.

## 2024-08-27 PROCEDURE — 99232 SBSQ HOSP IP/OBS MODERATE 35: CPT | Performed by: PSYCHIATRY & NEUROLOGY

## 2024-08-27 RX ADMIN — ARIPIPRAZOLE 5 MG: 5 TABLET ORAL at 08:18

## 2024-08-27 RX ADMIN — CYANOCOBALAMIN TAB 1000 MCG 1000 MCG: 1000 TAB at 08:18

## 2024-08-27 RX ADMIN — CHOLECALCIFEROL TAB 25 MCG (1000 UNIT) 2000 UNITS: 25 TAB at 08:18

## 2024-08-27 RX ADMIN — FAMOTIDINE 20 MG: 20 TABLET ORAL at 08:18

## 2024-08-27 RX ADMIN — SERTRALINE HYDROCHLORIDE 150 MG: 100 TABLET ORAL at 08:18

## 2024-08-27 RX ADMIN — FAMOTIDINE 20 MG: 20 TABLET ORAL at 17:06

## 2024-08-27 RX ADMIN — LEVOTHYROXINE SODIUM 37.5 MCG: 125 TABLET ORAL at 06:47

## 2024-08-27 RX ADMIN — ATORVASTATIN CALCIUM 10 MG: 10 TABLET, FILM COATED ORAL at 17:06

## 2024-08-27 NOTE — PROGRESS NOTES
Progress Note - Behavioral Health     Franco Roberson 39 y.o. male MRN: [id number]   Unit/Bed#: [facility] 345-02 Encounter: [id number]    Behavior over the last 24 hours: unchanged.     Franco seen today, per staff report has been pleasant and calm and cooperative.  He has not reported any symptoms.  Sometimes he withdraws to his room but participating in the group.  Participating in the group today   on the unit.  I evaluated the patient.  He continues to do well and offers no complaints at this time.  The patient states that his medications have been helping him.  He is currently taking Zoloft and Abilify.  He says that it helps with his mood.  He says that after the loss of his mother he thought that he will be very angry but he has been able to deal with his emotions fairly well and believes that the medication has been helpful in the situation.      Sleep: normal  Appetite: normal  Medication side effects: None reported at this time      Mental Status Evaluation:    Appearance:  age appropriate   Behavior:  normal, pleasant, cooperative   Speech:  normal rate, normal volume, normal pitch   Mood:  normal   Affect:  appropriate   Thought Process:  logical, coherent   Associations: intact associations   Thought Content:  no overt delusions   Perceptual Disturbances: no auditory hallucinations   Risk Potential: Suicidal ideation - None  Homicidal ideation - None   Sensorium:  oriented to person, place, and time/date   Memory:  recent and remote memory grossly intact   Consciousness:  alert and awake   Attention: attention span and concentration are age appropriate   Insight:  good   Judgment: good   Gait/Station: normal gait/station   Motor Activity: no abnormal movements     Vital signs in last 24 hours:    Temp:  [97 °F (36.1 °C)-97.5 °F (36.4 °C)] 97.5 °F (36.4 °C)  HR:  [70-76] 70  Resp:  [16] 16  BP: (113-118)/(68-74) 113/68    Laboratory results: I have personally reviewed all pertinent laboratory/tests  results.        Assessment & Plan   Principal Problem:    MDD (major depressive disorder), recurrent severe, without psychosis (HCC)  Active Problems:    Unspecified depressive disorder (HCC)    Medical clearance for psychiatric admission    Hyperlipidemia    Vitamin D deficiency    B12 deficiency    Hypothyroidism    Rule Out Autism spectrum disorder    Recommended Treatment:     Planned medication and treatment changes:      All current active medications have been reviewed  Encourage group therapy, milieu therapy and occupational therapy  Behavioral Health checks every 7 minutes  Current Facility-Administered Medications   Medication Dose Route Frequency Provider Last Rate    acetaminophen  650 mg Oral Q6H PRN Basilio Brown MD      acetaminophen  650 mg Oral Q4H PRN Basilio Brown MD      acetaminophen  975 mg Oral Q6H PRN Basilio Brown MD      aluminum-magnesium hydroxide-simethicone  30 mL Oral Q4H PRN Basilio Brown MD      ARIPiprazole  5 mg Oral Daily Basilio Panda MD      Artificial Tears  1 drop Both Eyes Q3H PRN Basilio Brown MD      atorvastatin  10 mg Oral Daily With Dinner WALLY Baptiste      benztropine  1 mg Intramuscular Q4H PRN Max 6/day Basilio Brown MD      benztropine  1 mg Oral Q4H PRN Max 6/day Basilio Brown MD      Cholecalciferol  2,000 Units Oral Daily WALLY Baptiste      cyanocobalamin  1,000 mcg Oral Daily WALLY Baptiste      Diclofenac Sodium  2 g Topical 4x Daily PRN WALLY Baptiste      hydrOXYzine HCL  50 mg Oral Q6H PRN Max 4/day Basilio Brown MD      Or    diphenhydrAMINE  50 mg Intramuscular Q6H PRN Basilio Brown MD      famotidine  20 mg Oral BID WALLY Baptiste      hydrOXYzine HCL  100 mg Oral Q6H PRN Max 4/day Basilio Brown MD      Or    LORazepam  2 mg Intramuscular Q6H PRN Basilio Brown MD      hydrOXYzine HCL  25 mg Oral Q6H PRN Max  4/day Basilio Brown MD      levothyroxine  37.5 mcg Oral Early Morning WALLY Baptiste      melatonin  6 mg Oral HS PRN WALLY Gomez      methocarbamol  500 mg Oral Q6H PRN WALLY Baptiste      OLANZapine  5 mg Oral Q4H PRN Max 3/day Basilio Brown MD      Or    OLANZapine  2.5 mg Intramuscular Q4H PRN Max 3/day Basilio Brown MD      OLANZapine  5 mg Oral Q3H PRN Max 3/day Basilio Brown MD      Or    OLANZapine  5 mg Intramuscular Q3H PRN Max 3/day Basilio Brown MD      OLANZapine  2.5 mg Oral Q4H PRN Max 6/day Basilio Brown MD      polyethylene glycol  17 g Oral Daily PRN Basilio Brown MD      propranolol  10 mg Oral Q8H PRN Basilio Brown MD      senna-docusate sodium  1 tablet Oral Daily PRN Basilio Brown MD      sertraline  150 mg Oral Daily WALLY Gomez      traZODone  50 mg Oral HS PRN Basilio Brown MD         Risks / Benefits of Treatment:    Risks, benefits, and possible side effects of medications explained to patient and patient verbalizes understanding and agreement for treatment.    Counseling / Coordination of Care:    Patient's progress discussed with staff in treatment team meeting.  Medications, treatment progress and treatment plan reviewed with patient.  Discussed about his discharge planning.  Awaiting for the placement.    Raj Guerrero MD 08/27/24

## 2024-08-27 NOTE — NURSING NOTE
Pt pleasant, calm and cooperative. Withdrawn to room, visible for needs. Pt appears sad, but denies all psych symptoms. No medications scheduled at this time. Denies any unmet needs at this time.

## 2024-08-27 NOTE — NURSING NOTE
"Pt observed on unit in milieu and dayroom. He interacts intermittently with his peers. Medication and meal compliant. He denies SI/HI/AH/VH. Although he appears depressed, he states \"I'm good. I've just resorted to default mode\". Pt attended group today. No PRN's requested. Continuous rounding maintained. No unmet needs.   "

## 2024-08-28 PROCEDURE — 99232 SBSQ HOSP IP/OBS MODERATE 35: CPT | Performed by: PSYCHIATRY & NEUROLOGY

## 2024-08-28 RX ADMIN — ATORVASTATIN CALCIUM 10 MG: 10 TABLET, FILM COATED ORAL at 17:08

## 2024-08-28 RX ADMIN — CHOLECALCIFEROL TAB 25 MCG (1000 UNIT) 2000 UNITS: 25 TAB at 08:18

## 2024-08-28 RX ADMIN — SERTRALINE HYDROCHLORIDE 150 MG: 100 TABLET ORAL at 08:18

## 2024-08-28 RX ADMIN — CYANOCOBALAMIN TAB 1000 MCG 1000 MCG: 1000 TAB at 08:18

## 2024-08-28 RX ADMIN — ARIPIPRAZOLE 5 MG: 5 TABLET ORAL at 08:18

## 2024-08-28 RX ADMIN — FAMOTIDINE 20 MG: 20 TABLET ORAL at 08:18

## 2024-08-28 RX ADMIN — FAMOTIDINE 20 MG: 20 TABLET ORAL at 17:08

## 2024-08-28 RX ADMIN — LEVOTHYROXINE SODIUM 37.5 MCG: 125 TABLET ORAL at 06:49

## 2024-08-28 NOTE — NURSING NOTE
Pt visible walking the unit, isolative to self. Denies psych symptoms but appears depressed. No PRNs or scheduled medications at this time. Pt denies any unmet needs.

## 2024-08-28 NOTE — PROGRESS NOTES
08/28/24 1002   Team Meeting   Meeting Type Daily Rounds   Team Members Present   Team Members Present Physician;Nurse;   Physician Team Member Lucero   [facility]   Care Management Team Member Noa   Patient/Family Present   Patient Present No   Patient's Family Present No     Pt is medication and meal compliant. Pt is social on the unit with staff and peers. Pt is pending discharge for placement.

## 2024-08-28 NOTE — NURSING NOTE
Pt is calm and cooperative, pt is visible at times in the milieu walking, isolative to self. Medication and meal compliant. Denies SI, HI,AH, and VH. Denies any needs at this time.

## 2024-08-28 NOTE — PLAN OF CARE
Problem: Ineffective Coping  Goal: Identifies ineffective coping skills  Outcome: Progressing  Goal: Identifies healthy coping skills  Outcome: Progressing  Goal: Demonstrates healthy coping skills  Outcome: Progressing  Goal: Participates in unit activities  Description: Interventions:  - Provide therapeutic environment   - Provide required programming   - Redirect inappropriate behaviors   Outcome: Progressing     Problem: Risk for Self Injury/Neglect  Goal: Treatment Goal: Remain safe during length of stay, learn and adopt new coping skills, and be free of self-injurious ideation, impulses and acts at the time of discharge  Outcome: Progressing     Problem: Depression  Goal: Treatment Goal: Demonstrate behavioral control of depressive symptoms, verbalize feelings of improved mood/affect, and adopt new coping skills prior to discharge  Outcome: Progressing  Goal: Verbalize thoughts and feelings  Description: Interventions:  - Assess and re-assess patient's level of risk   - Engage patient in 1:1 interactions, daily, for a minimum of 15 minutes   - Encourage patient to express feelings, fears, frustrations, hopes   Outcome: Progressing  Goal: Refrain from harming self  Description: Interventions:  - Monitor patient closely, per order   - Supervise medication ingestion, monitor effects and side effects   Outcome: Progressing  Goal: Refrain from isolation  Description: Interventions:  - Develop a trusting relationship   - Encourage socialization   Outcome: Progressing  Goal: Refrain from self-neglect  Outcome: Progressing  Goal: Attend and participate in unit activities, including therapeutic, recreational, and educational groups  Description: Interventions:  - Provide therapeutic and educational activities daily, encourage attendance and participation, and document same in the medical record   Outcome: Progressing  Goal: Complete daily ADLs, including personal hygiene independently, as able  Description:  Interventions:  - Observe, teach, and assist patient with ADLS  -  Monitor and promote a balance of rest/activity, with adequate nutrition and elimination   Outcome: Progressing     Problem: Anxiety  Goal: Anxiety is at manageable level  Description: Interventions:  - Assess and monitor patient's anxiety level.   - Monitor for signs and symptoms (heart palpitations, chest pain, shortness of breath, headaches, nausea, feeling jumpy, restlessness, irritable, apprehensive).   - Collaborate with interdisciplinary team and initiate plan and interventions as ordered.  - [place] patient to unit/surroundings  - Explain treatment plan  - Encourage participation in care  - Encourage verbalization of concerns/fears  - Identify coping mechanisms  - Assist in developing anxiety-reducing skills  - Administer/offer alternative therapies  - Limit or eliminate stimulants  Outcome: Progressing     Problem: DISCHARGE PLANNING - CARE MANAGEMENT  Goal: Discharge to post-acute care or home with appropriate resources  Description: INTERVENTIONS:  - Conduct assessment to determine patient/family and health care team treatment goals, and need for post-acute services based on payer coverage, community resources, and patient preferences, and barriers to discharge  - Address psychosocial, clinical, and financial barriers to discharge as identified in assessment in conjunction with the patient/family and health care team  - Arrange appropriate level of post-acute services according to patient’s   needs and preference and payer coverage in collaboration with the physician and health care team  - Communicate with and update the patient/family, physician, and health care team regarding progress on the discharge plan  - Arrange appropriate transportation to post-acute venues  Outcome: Progressing     Problem: Knowledge Deficit  Goal: Patient/family/caregiver demonstrates understanding of disease process, treatment plan, medications, and discharge  instructions  Description: Complete learning assessment and assess knowledge base.  Interventions:  - Provide teaching at level of understanding  - Provide teaching via preferred learning methods  Outcome: Progressing     Problem: SLEEP DISTURBANCE  Goal: Will exhibit normal sleeping pattern  Description: Interventions:  -  Assess the patients sleep pattern, noting recent changes  - Administer medication as ordered  - Decrease environmental stimuli, including noise, as appropriate during the night  - Encourage the patient to actively participate in unit groups and or exercise during the day to enhance ability to achieve adequate sleep at night  - Assess the patient, in the morning, encouraging a description of sleep experience  Outcome: Progressing

## 2024-08-28 NOTE — PROGRESS NOTES
Progress Note - Behavioral Health     Franco Roberson 39 y.o. male MRN: [id number]   Unit/Bed#: U 345-02 Encounter: [id number]    Behavior over the last 24 hours: unchanged.     Franco seen today, per staff report has been attending some groups and participates appropriately.  He is currently waiting for placement in the community.  He remains compliant with his medication on the unit.  The patient reports that he continues to do well.  He states that he has not been using melatonin for sleep and is able to sleep without any difficulties.  Reports no changes in his mood he says that he continues to feel good and has not been thinking about the past where he would get agitated and angry at the world.  He says that he has also accepted his mother's loss at this point.  He has been eating well.  Interaction with others remains limited.         Sleep: normal  Appetite: normal  Medication side effects: None reported      Mental Status Evaluation:    Appearance:  age appropriate   Behavior:  normal, pleasant, cooperative   Speech:  normal rate, normal volume, normal pitch   Mood:  normal   Affect:  appropriate   Thought Process:  logical, coherent   Associations: intact associations   Thought Content:  no overt delusions   Perceptual Disturbances: no auditory hallucinations, no visual hallucinations   Risk Potential: Suicidal ideation - None at present  Homicidal ideation - None at present   Sensorium:  oriented to person, place, and time/date   Memory:  recent and remote memory grossly intact   Consciousness:  alert and awake   Attention: attention span and concentration are age appropriate   Insight:  good   Judgment: good   Gait/Station: normal gait/station   Motor Activity: no abnormal movements     Vital signs in last 24 hours:    Temp:  [97.1 °F (36.2 °C)-97.8 °F (36.6 °C)] 97.1 °F (36.2 °C)  HR:  [66-72] 72  Resp:  [16] 16  BP: (118-124)/(64-75) 118/64    Laboratory results: I have personally reviewed all pertinent  laboratory/tests results.        Assessment & Plan   Principal Problem:    MDD (major depressive disorder), recurrent severe, without psychosis (HCC)  Active Problems:    Unspecified depressive disorder (HCC)    Medical clearance for psychiatric admission    Hyperlipidemia    Vitamin D deficiency    B12 deficiency    Hypothyroidism    Rule Out Autism spectrum disorder    Recommended Treatment:     Planned medication and treatment changes:      All current active medications have been reviewed  Encourage group therapy, milieu therapy and occupational therapy  Behavioral Health checks every 7 minutes  Current Facility-Administered Medications   Medication Dose Route Frequency Provider Last Rate    acetaminophen  650 mg Oral Q6H PRN Basilio Brown MD      acetaminophen  650 mg Oral Q4H PRN Basilio Brown MD      acetaminophen  975 mg Oral Q6H PRN Basilio Brown MD      aluminum-magnesium hydroxide-simethicone  30 mL Oral Q4H PRN Basilio Brown MD      ARIPiprazole  5 mg Oral Daily Basilio Panda MD      Artificial Tears  1 drop Both Eyes Q3H PRN Basilio Brown MD      atorvastatin  10 mg Oral Daily With Dinner WALLY Baptiste      benztropine  1 mg Intramuscular Q4H PRN Max 6/day Basilio Brown MD      benztropine  1 mg Oral Q4H PRN Max 6/day Basilio Brown MD      Cholecalciferol  2,000 Units Oral Daily WALLY Baptiste      cyanocobalamin  1,000 mcg Oral Daily WALLY Baptiste      Diclofenac Sodium  2 g Topical 4x Daily PRN WALLY Baptiste      hydrOXYzine HCL  50 mg Oral Q6H PRN Max 4/day Basilio Brown MD      Or    diphenhydrAMINE  50 mg Intramuscular Q6H PRN Basilio Brown MD      famotidine  20 mg Oral BID WALLY Baptiste      hydrOXYzine HCL  100 mg Oral Q6H PRN Max 4/day Basilio Brown MD      Or    LORazepam  2 mg Intramuscular Q6H PRN Basilio Brown MD      hydrOXYzine HCL  25 mg Oral  Q6H PRN Max 4/day Basilio Brown MD      levothyroxine  37.5 mcg Oral Early Morning WALLY Baptiste      melatonin  6 mg Oral HS PRN WALLY Gomez      methocarbamol  500 mg Oral Q6H PRN WALLY Baptiste      OLANZapine  5 mg Oral Q4H PRN Max 3/day Basilio Brown MD      Or    OLANZapine  2.5 mg Intramuscular Q4H PRN Max 3/day Basilio Brown MD      OLANZapine  5 mg Oral Q3H PRN Max 3/day Basilio Brown MD      Or    OLANZapine  5 mg Intramuscular Q3H PRN Max 3/day Basilio Brown MD      OLANZapine  2.5 mg Oral Q4H PRN Max 6/day Basilio Brown MD      polyethylene glycol  17 g Oral Daily PRN Basilio Brown MD      propranolol  10 mg Oral Q8H PRN Basilio Brown MD      senna-docusate sodium  1 tablet Oral Daily PRN Basilio Brown MD      sertraline  150 mg Oral Daily WALLY Gomez      traZODone  50 mg Oral HS PRN Basilio Brown MD         Risks / Benefits of Treatment:    Risks, benefits, and possible side effects of medications explained to patient and patient verbalizes understanding and agreement for treatment.    Counseling / Coordination of Care:    Patient's progress discussed with staff in treatment team meeting.  Medications, treatment progress and treatment plan reviewed with patient.Discussed about his long term plans and ability to manage on his own.He feels that living in supervised setting to start with will help.    Raj Guerrero MD 08/28/24

## 2024-08-28 NOTE — SOCIAL WORK
Cm called and followed up with [facility]. They reported that they reviewed Franco's case and reported he will need to have the hospital deem him incapacitated and take guardian ship. Cm gave push back due to Pt not being incapacitated.   Gabo called [facility] homeless division and reported to them Pt's situation and they reported they cannot help and gave Cm the office of aging and disabilities.  Gabo called them at [phone]. They took down additional information regarding Pt situation and demographics.   Gabo reported to  that Pt is interested in a group home setting. She reported her supervisor Madelyn will be getting back to . Gabo provided them with call back information and nurses station phone number to speak with Pt.

## 2024-08-28 NOTE — PROGRESS NOTES
08/27/24 0900   Team Meeting   Meeting Type Daily Rounds   Team Members Present   Team Members Present Physician;Nurse;   Physician Team Member Lucero   [facility]   Care Management Team Member Noa   Patient/Family Present   Patient Present No   Patient's Family Present No     Pt is medication and meal compliant. Pt has been attending group and social with selective peers and staff. Pt is pending placement.

## 2024-08-29 PROCEDURE — 99232 SBSQ HOSP IP/OBS MODERATE 35: CPT | Performed by: PSYCHIATRY & NEUROLOGY

## 2024-08-29 RX ORDER — DIPHENHYDRAMINE HYDROCHLORIDE, ZINC ACETATE 2; .1 G/100G; G/100G
CREAM TOPICAL DAILY PRN
Status: DISPENSED | OUTPATIENT
Start: 2024-08-29

## 2024-08-29 RX ORDER — BENZOCAINE/MENTHOL 6 MG-10 MG
LOZENGE MUCOUS MEMBRANE 4 TIMES DAILY PRN
Status: DISPENSED | OUTPATIENT
Start: 2024-08-29 | End: 2024-09-05

## 2024-08-29 RX ADMIN — CYANOCOBALAMIN TAB 1000 MCG 1000 MCG: 1000 TAB at 08:14

## 2024-08-29 RX ADMIN — SERTRALINE HYDROCHLORIDE 150 MG: 100 TABLET ORAL at 08:14

## 2024-08-29 RX ADMIN — HYDROCORTISONE 1 APPLICATION: 1 CREAM TOPICAL at 21:05

## 2024-08-29 RX ADMIN — CHOLECALCIFEROL TAB 25 MCG (1000 UNIT) 2000 UNITS: 25 TAB at 08:14

## 2024-08-29 RX ADMIN — FAMOTIDINE 20 MG: 20 TABLET ORAL at 17:08

## 2024-08-29 RX ADMIN — DIPHENHYDRAMINE HYDROCHLORIDE, ZINC ACETATE 1 APPLICATION: 2; .1 CREAM TOPICAL at 19:36

## 2024-08-29 RX ADMIN — ARIPIPRAZOLE 5 MG: 5 TABLET ORAL at 08:14

## 2024-08-29 RX ADMIN — LEVOTHYROXINE SODIUM 37.5 MCG: 125 TABLET ORAL at 06:11

## 2024-08-29 RX ADMIN — FAMOTIDINE 20 MG: 20 TABLET ORAL at 08:14

## 2024-08-29 RX ADMIN — ATORVASTATIN CALCIUM 10 MG: 10 TABLET, FILM COATED ORAL at 17:08

## 2024-08-29 NOTE — PROGRESS NOTES
Progress Note - Behavioral Health     Franco Roberson 39 y.o. male MRN: [id number]   Unit/Bed#: [facility] 345-02 Encounter: [id number]    Behavior over the last 24 hours: unchanged.     Franco seen today, per staff report has been reporting mild depression but remains medication and meal compliant.  He participates in the group and interacts with select peers.  On the unit.  The patient reports that he was feeling little depressed because placement in one of the group home was recently denied.  He says that he is however hopeful that some other place will accept him.  The patient is not reporting any hopelessness helplessness or persistent sad moods.  He says that he has been sleeping well.  Interaction with peers remains limited but attends most of the groups.  He has been taking his medication as prescribed.         Sleep: normal  Appetite: normal  Medication side effects: Denies any side effects from the medication.      Mental Status Evaluation:    Appearance:  age appropriate   Behavior:  pleasant, cooperative   Speech:  normal rate, normal volume   Mood:  I am a little sad.   Affect:  constricted   Thought Process:  organized, logical, coherent   Associations: intact associations   Thought Content:  no overt delusions   Perceptual Disturbances: no auditory hallucinations, no visual hallucinations   Risk Potential: Suicidal ideation - None at present  Homicidal ideation - None at present   Sensorium:  oriented to person, place, and time/date   Memory:  recent and remote memory grossly intact   Consciousness:  alert and awake   Attention: attention span and concentration are age appropriate   Insight:  good   Judgment: good   Gait/Station: normal gait/station   Motor Activity: no abnormal movements     Vital signs in last 24 hours:    Temp:  [97 °F (36.1 °C)-97.6 °F (36.4 °C)] 97.6 °F (36.4 °C)  HR:  [60-70] 60  Resp:  [16] 16  BP: (106-107)/(61-68) 106/61    Laboratory results: I have personally reviewed all pertinent  laboratory/tests results.  Most Recent Labs:   Lab Results   Component Value Date    WBC 7.01 06/27/2024    RBC 4.54 06/27/2024    HGB 14.5 06/27/2024    HCT 44.4 06/27/2024     06/27/2024    RDW 12.0 06/27/2024    NEUTROABS 4.01 06/27/2024    SODIUM 139 07/10/2024    K 3.8 07/10/2024     07/10/2024    CO2 27 07/10/2024    BUN 16 07/10/2024    CREATININE 0.92 07/10/2024    GLUC 87 07/10/2024    GLUF 87 07/10/2024    CALCIUM 9.3 07/10/2024    AST 28 07/10/2024    ALT 36 07/10/2024    ALKPHOS 66 07/10/2024    TP 7.7 07/10/2024    ALB 4.4 07/10/2024    TBILI 0.55 07/10/2024    CHOLESTEROL 159 08/09/2024    HDL 54 08/09/2024    TRIG 88 08/09/2024    LDLCALC 87 08/09/2024    NONHDLC 105 08/09/2024    STK0ZMEURXEC 5.705 (H) 07/10/2024    FREET4 0.54 (L) 07/10/2024           Assessment & Plan   Principal Problem:    MDD (major depressive disorder), recurrent severe, without psychosis (HCC)  Active Problems:    Unspecified depressive disorder (HCC)    Medical clearance for psychiatric admission    Hyperlipidemia    Vitamin D deficiency    B12 deficiency    Hypothyroidism    Rule Out Autism spectrum disorder    Recommended Treatment:     Planned medication and treatment changes:      All current active medications have been reviewed  Encourage group therapy, milieu therapy and occupational therapy  Behavioral Health checks every 7 minutes  Current Facility-Administered Medications   Medication Dose Route Frequency Provider Last Rate    acetaminophen  650 mg Oral Q6H PRN Basilio Brown MD      acetaminophen  650 mg Oral Q4H PRN Basilio Brown MD      acetaminophen  975 mg Oral Q6H PRN Basilio Brown MD      aluminum-magnesium hydroxide-simethicone  30 mL Oral Q4H PRN Basilio Brown MD      ARIPiprazole  5 mg Oral Daily Basilio Panda MD      Artificial Tears  1 drop Both Eyes Q3H PRN Basilio Brown MD      atorvastatin  10 mg Oral Daily With Dinner WALLY Baptiste       benztropine  1 mg Intramuscular Q4H PRN Max 6/day Basilio Brown MD      benztropine  1 mg Oral Q4H PRN Max 6/day Basilio Brown MD      Cholecalciferol  2,000 Units Oral Daily Laura Clark WALLY [place]      cyanocobalamin  1,000 mcg Oral Daily Laura RomeWALLY Jacobson      Diclofenac Sodium  2 g Topical 4x Daily PRN WALLY Baptiste      hydrOXYzine HCL  50 mg Oral Q6H PRN Max 4/day Basilio Brown MD      Or    diphenhydrAMINE  50 mg Intramuscular Q6H PRN Basilio Brown MD      famotidine  20 mg Oral BID Laura WALLY Olmos      hydrocortisone   Topical 4x Daily PRN WALLY Baptiste      hydrOXYzine HCL  100 mg Oral Q6H PRN Max 4/day Basilio Brown MD      Or    LORazepam  2 mg Intramuscular Q6H PRN Basilio Brown MD      hydrOXYzine HCL  25 mg Oral Q6H PRN Max 4/day Basilio Brown MD      levothyroxine  37.5 mcg Oral Early Morning WALLY Baptiste      melatonin  6 mg Oral HS PRN WALLY Gomez      methocarbamol  500 mg Oral Q6H PRN WALLY Baptiste      OLANZapine  5 mg Oral Q4H PRN Max 3/day Basilio Brown MD      Or    OLANZapine  2.5 mg Intramuscular Q4H PRN Max 3/day Basilio Brown MD      OLANZapine  5 mg Oral Q3H PRN Max 3/day Basilio Brown MD      Or    OLANZapine  5 mg Intramuscular Q3H PRN Max 3/day Basilio Brown MD      OLANZapine  2.5 mg Oral Q4H PRN Max 6/day Basilio Brown MD      polyethylene glycol  17 g Oral Daily PRN Basilio Brown MD      propranolol  10 mg Oral Q8H PRN Basilio Brown MD      senna-docusate sodium  1 tablet Oral Daily PRN Basilio Brown MD      sertraline  150 mg Oral Daily WALLY Gomez      traZODone  50 mg Oral HS PRN Basilio Brown MD         Risks / Benefits of Treatment:    Risks, benefits, and possible side effects of medications explained to patient and patient verbalizes understanding and agreement  for treatment.    Counseling / Coordination of Care:    Patient's progress discussed with staff in treatment team meeting.  Medications, treatment progress and treatment plan reviewed with patient.  Discussed with the patient that there will be other places where he will be accepted.  The patient remains hopeful.  Agrees to work with the .    Raj Guerrero MD 08/29/24

## 2024-08-29 NOTE — NURSING NOTE
"Pt denies SI/HI/AH/VH. Pt reports mild depression. Medication and meal compliant. Present in dayroom and milieu but is mostly isolative to room this morning. Social with select peers but is more open to communication with staff. Discussed with medical about pt c/o itchy \" bug bite \". Pt compliant with Lunch. No further concerns as of present. Plan of care ongoing.   "

## 2024-08-29 NOTE — SOCIAL WORK
[facility] called Pt on nursing phone, but Pt was not able to talk to them due to being in group. Cm called Mervat back from [facility] [phone] and she was currently on a call and was unable to speak with Cm at this time. Cm left a message and is waiting to hear back.

## 2024-08-29 NOTE — NURSING NOTE
Pt visible on unit and social mainly with staff when approached, pleasant but appears depressed. Denies SI/HI/AVH. Denies needs. States though the news of the group home turning him down was hard, he remains hopeful a living situation can be found. Compliant with unit routines. Safety checks ongoing.    Pt did report what he believes is a bug bite on his fore arm and asks that something be ordered for itch. Will pass on for medical tomorrow.

## 2024-08-29 NOTE — PROGRESS NOTES
"   08/29/24 0844   Team Meeting   Meeting Type Daily Rounds   Team Members Present   Team Members Present Physician;Nurse;   Physician Team Member Lucero   [facility]   Care Management Team Member Noa   Patient/Family Present   Patient Present No   Patient's Family Present No     Pt is medication and meal compliant. Pt remains in behavioral control. Pt reported having an \"itch\"and is being given hydrocortisone cream. Pt's discharge is pending placement.    "

## 2024-08-30 PROCEDURE — 99232 SBSQ HOSP IP/OBS MODERATE 35: CPT

## 2024-08-30 RX ADMIN — ARIPIPRAZOLE 5 MG: 5 TABLET ORAL at 08:34

## 2024-08-30 RX ADMIN — ATORVASTATIN CALCIUM 10 MG: 10 TABLET, FILM COATED ORAL at 17:16

## 2024-08-30 RX ADMIN — LEVOTHYROXINE SODIUM 37.5 MCG: 125 TABLET ORAL at 06:25

## 2024-08-30 RX ADMIN — FAMOTIDINE 20 MG: 20 TABLET ORAL at 08:33

## 2024-08-30 RX ADMIN — SERTRALINE HYDROCHLORIDE 150 MG: 100 TABLET ORAL at 08:33

## 2024-08-30 RX ADMIN — CHOLECALCIFEROL TAB 25 MCG (1000 UNIT) 2000 UNITS: 25 TAB at 08:33

## 2024-08-30 RX ADMIN — FAMOTIDINE 20 MG: 20 TABLET ORAL at 17:16

## 2024-08-30 RX ADMIN — CYANOCOBALAMIN TAB 1000 MCG 1000 MCG: 1000 TAB at 08:33

## 2024-08-30 NOTE — PROGRESS NOTES
Progress Note - Behavioral Health   Franco Roberson 39 y.o. male MRN: [id number]  Unit/Bed#: U 345-02 Encounter: [id number]    Behavior over the last 24 hours:  unchanged.   Sleep: hypersomnia  Appetite: normal  Medication side effects: No  ROS: no complaints    Mental Status Evaluation:  Appearance:  casually dressed and marginal hygiene   Behavior:  cooperative   Speech:  Fluent, normal rate   Mood:  normal   Affect:  constricted   Thought Process:  goal directed   Thought Content:  No overt delusions   Perceptual Disturbances: None   Risk Potential: Suicidal Ideations none and Homicidal Ideations none   Sensorium:  person, place, and situation   Cognition:  recent and remote memory grossly intact   Consciousness:  alert and awake    Attention: attention span and concentration were age appropriate   Insight:  fair   Judgment: age appropriate and fair   Gait/Station: In bed   Motor Activity: no abnormal movements         Progress Toward Goals: Per RN report he will attend few groups and is compliant and tolerating medications.  He is hoping that [facility] will be able to help with placement since he is a [place] resident and group attendance is encouraged.  Wt Readings from Last 3 Encounters:   08/25/24 131 kg (289 lb)   05/14/24 128 kg (281 lb 6.4 oz)   03/17/17 118 kg (260 lb)     Temp Readings from Last 3 Encounters:   08/31/24 (!) 97 °F (36.1 °C) (Temporal)   05/14/24 98 °F (36.7 °C)   03/17/17 98.8 °F (37.1 °C) (Tympanic)     BP Readings from Last 3 Encounters:   08/31/24 126/63   05/14/24 140/93   03/17/17 137/67     Pulse Readings from Last 3 Encounters:   08/31/24 72   05/14/24 72   03/17/17 94       Assessment & Plan   Principal Problem:    MDD (major depressive disorder), recurrent severe, without psychosis (HCC)  Active Problems:    Unspecified depressive disorder (HCC)    Medical clearance for psychiatric admission    Hyperlipidemia    Vitamin D deficiency    B12 deficiency    Hypothyroidism    Rule Out  Autism spectrum disorder      Recommended Treatment: Continue with group therapy, milieu therapy and occupational therapy.      Risks, benefits and possible side effects of Medications:   Risks, benefits, and possible side effects of medications explained to patient and patient verbalizes understanding.      Medications:       all current active meds have been reviewed, continue current psychiatric medications, and current meds:   Current Facility-Administered Medications   Medication Dose Route Frequency    acetaminophen (TYLENOL) tablet 650 mg  650 mg Oral Q6H PRN    acetaminophen (TYLENOL) tablet 650 mg  650 mg Oral Q4H PRN    acetaminophen (TYLENOL) tablet 975 mg  975 mg Oral Q6H PRN    aluminum-magnesium hydroxide-simethicone (MAALOX) oral suspension 30 mL  30 mL Oral Q4H PRN    ARIPiprazole (ABILIFY) tablet 5 mg  5 mg Oral Daily    Artificial Tears ophthalmic solution 1 drop  1 drop Both Eyes Q3H PRN    atorvastatin (LIPITOR) tablet 10 mg  10 mg Oral Daily With Dinner    benztropine (COGENTIN) injection 1 mg  1 mg Intramuscular Q4H PRN Max 6/day    benztropine (COGENTIN) tablet 1 mg  1 mg Oral Q4H PRN Max 6/day    Cholecalciferol (VITAMIN D3) tablet 2,000 Units  2,000 Units Oral Daily    cyanocobalamin (VITAMIN B-12) tablet 1,000 mcg  1,000 mcg Oral Daily    Diclofenac Sodium (VOLTAREN) 1 % topical gel 2 g  2 g Topical 4x Daily PRN    hydrOXYzine HCL (ATARAX) tablet 50 mg  50 mg Oral Q6H PRN Max 4/day    Or    diphenhydrAMINE (BENADRYL) injection 50 mg  50 mg Intramuscular Q6H PRN    diphenhydrAMINE-zinc acetate (BENADRYL) 2-0.1 % cream   Topical Daily PRN    famotidine (PEPCID) tablet 20 mg  20 mg Oral BID    hydrocortisone 1 % cream   Topical 4x Daily PRN    hydrOXYzine HCL (ATARAX) tablet 100 mg  100 mg Oral Q6H PRN Max 4/day    Or    LORazepam (ATIVAN) injection 2 mg  2 mg Intramuscular Q6H PRN    hydrOXYzine HCL (ATARAX) tablet 25 mg  25 mg Oral Q6H PRN Max 4/day    levothyroxine tablet 37.5 mcg  37.5 mcg  Oral Early Morning    melatonin tablet 6 mg  6 mg Oral HS PRN    methocarbamol (ROBAXIN) tablet 500 mg  500 mg Oral Q6H PRN    OLANZapine (ZyPREXA) tablet 5 mg  5 mg Oral Q4H PRN Max 3/day    Or    OLANZapine (ZyPREXA) IM injection 2.5 mg  2.5 mg Intramuscular Q4H PRN Max 3/day    OLANZapine (ZyPREXA) tablet 5 mg  5 mg Oral Q3H PRN Max 3/day    Or    OLANZapine (ZyPREXA) IM injection 5 mg  5 mg Intramuscular Q3H PRN Max 3/day    OLANZapine (ZyPREXA) tablet 2.5 mg  2.5 mg Oral Q4H PRN Max 6/day    polyethylene glycol (MIRALAX) packet 17 g  17 g Oral Daily PRN    propranolol (INDERAL) tablet 10 mg  10 mg Oral Q8H PRN    senna-docusate sodium (SENOKOT S) 8.6-50 mg per tablet 1 tablet  1 tablet Oral Daily PRN    sertraline (ZOLOFT) tablet 150 mg  150 mg Oral Daily    traZODone (DESYREL) tablet 50 mg  50 mg Oral HS PRN   .    Labs:  Reviewed  Admission on 05/14/2024   Component Date Value    Sodium 05/15/2024 142     Potassium 05/15/2024 4.1     Chloride 05/15/2024 106     CO2 05/15/2024 28     ANION GAP 05/15/2024 8     BUN 05/15/2024 13     Creatinine 05/15/2024 0.97     Glucose 05/15/2024 89     Glucose, Fasting 05/15/2024 89     Calcium 05/15/2024 9.3     AST 05/15/2024 21     ALT 05/15/2024 19     Alkaline Phosphatase 05/15/2024 66     Total Protein 05/15/2024 7.6     Albumin 05/15/2024 4.3     Total Bilirubin 05/15/2024 0.69     eGFR 05/15/2024 97     WBC 05/15/2024 5.42     RBC 05/15/2024 5.05     Hemoglobin 05/15/2024 16.1     Hematocrit 05/15/2024 49.4 (H)     MCV 05/15/2024 98     MCH 05/15/2024 31.9     MCHC 05/15/2024 32.6     RDW 05/15/2024 12.6     MPV 05/15/2024 9.2     Platelets 05/15/2024 268     nRBC 05/15/2024 0     Segmented % 05/15/2024 53     Immature Grans % 05/15/2024 0     Lymphocytes % 05/15/2024 38     Monocytes % 05/15/2024 7     Eosinophils Relative 05/15/2024 2     Basophils Relative 05/15/2024 0     Absolute Neutrophils 05/15/2024 2.83     Absolute Immature Grans 05/15/2024 0.01      Absolute Lymphocytes 05/15/2024 2.06     Absolute Monocytes 05/15/2024 0.40     Eosinophils Absolute 05/15/2024 0.10     Basophils Absolute 05/15/2024 0.02     TSH 3RD GENERATON 05/15/2024 5.162 (H)     Vitamin B-12 05/15/2024 168 (L)     Folate 05/15/2024 >22.3     Vit D, 25-Hydroxy 05/15/2024 11.6 (L)     Cholesterol 05/15/2024 191     Triglycerides 05/15/2024 116     HDL, Direct 05/15/2024 44     LDL Calculated 05/15/2024 124 (H)     Non-HDL-Chol (CHOL-HDL) 05/15/2024 147     Syphilis Total Antibody 05/15/2024 Non-reactive     Free T4 05/15/2024 0.60 (L)     Ventricular Rate 05/15/2024 66     Atrial Rate 05/15/2024 66     [place] Interval 05/15/2024 146     QRSD Interval 05/15/2024 70     QT Interval 05/15/2024 402     QTC Interval 05/15/2024 421     P Axis 05/15/2024 39     QRS Axis 05/15/2024 48     T Wave Axis 05/15/2024 41     Ventricular Rate 05/15/2024 61     Atrial Rate 05/15/2024 61     [place] Interval 05/15/2024 188     QRSD Interval 05/15/2024 92     QT Interval 05/15/2024 426     QTC Interval 05/15/2024 428     P Axis 05/15/2024 56     QRS Axis 05/15/2024 50     T Wave Axis 05/15/2024 46     Color, UA 06/26/2024 Yellow     Clarity, UA 06/26/2024 Slightly Cloudy (A)     Specific [place], UA 06/26/2024 1.020     pH, UA 06/26/2024 6.0     Leukocytes, UA 06/26/2024 Negative     Nitrite, UA 06/26/2024 Negative     Protein, UA 06/26/2024 15 (Trace) (A)     Glucose, UA 06/26/2024 Negative     Ketones, UA 06/26/2024 Negative     Bilirubin, UA 06/26/2024 Negative     Occult Blood, UA 06/26/2024 Negative     UROBILINOGEN UA 06/26/2024 Negative     RBC, UA 06/26/2024 None Seen     WBC, UA 06/26/2024 None Seen     Epithelial Cells 06/26/2024 None Seen     Bacteria, UA 06/26/2024 Moderate (A)     AMORPH URATES 06/26/2024 Moderate     MUCUS THREADS 06/26/2024 Occasional (A)     Sodium 06/27/2024 138     Potassium 06/27/2024 4.5     Chloride 06/27/2024 101     CO2 06/27/2024 30     ANION GAP 06/27/2024 7     BUN 06/27/2024 21      Creatinine 06/27/2024 1.02     Glucose 06/27/2024 85     Calcium 06/27/2024 9.7     AST 06/27/2024 20     ALT 06/27/2024 22     Alkaline Phosphatase 06/27/2024 68     Total Protein 06/27/2024 7.9     Albumin 06/27/2024 4.4     Total Bilirubin 06/27/2024 0.32     eGFR 06/27/2024 92     WBC 06/27/2024 7.01     RBC 06/27/2024 4.54     Hemoglobin 06/27/2024 14.5     Hematocrit 06/27/2024 44.4     MCV 06/27/2024 98     MCH 06/27/2024 31.9     MCHC 06/27/2024 32.7     RDW 06/27/2024 12.0     MPV 06/27/2024 8.9     Platelets 06/27/2024 249     nRBC 06/27/2024 0     Segmented % 06/27/2024 58     Immature Grans % 06/27/2024 0     Lymphocytes % 06/27/2024 31     Monocytes % 06/27/2024 9     Eosinophils Relative 06/27/2024 2     Basophils Relative 06/27/2024 0     Absolute Neutrophils 06/27/2024 4.01     Absolute Immature Grans 06/27/2024 0.02     Absolute Lymphocytes 06/27/2024 2.14     Absolute Monocytes 06/27/2024 0.65     Eosinophils Absolute 06/27/2024 0.17     Basophils Absolute 06/27/2024 0.02     QFT Nil 07/03/2024 0.03     QFT TB1-NIL 07/03/2024 0.02     QFT TB2-NIL 07/03/2024 0.04     QFT Mitogen-NIL 07/03/2024 9.27     QFT Final Interpretation 07/03/2024 Negative     Cholesterol 07/05/2024 175     Triglycerides 07/05/2024 99     HDL, Direct 07/05/2024 53     LDL Calculated 07/05/2024 102 (H)     Non-HDL-Chol (CHOL-HDL) 07/05/2024 122     Clarity, UA 07/09/2024 Clear     Color, UA 07/09/2024 Straw     Specific [place], UA 07/09/2024 1.015     pH, UA 07/09/2024 6.0     Glucose, UA 07/09/2024 Negative     Ketones, UA 07/09/2024 Negative     Occult Blood, UA 07/09/2024 Negative     Protein, UA 07/09/2024 Negative     Nitrite, UA 07/09/2024 Negative     Bilirubin, UA 07/09/2024 Negative     Leukocytes, UA 07/09/2024 Negative     WBC, UA 07/09/2024 None Seen     RBC, UA 07/09/2024 None Seen     Bacteria, UA 07/09/2024 None Seen     Epithelial Cells 07/09/2024 None Seen     UROBILINOGEN UA 07/09/2024 Negative     Urine  Culture 07/09/2024 No Growth <1000 cfu/mL     TSH 3RD GENERATON 07/10/2024 5.705 (H)     Sodium 07/10/2024 139     Potassium 07/10/2024 3.8     Chloride 07/10/2024 103     CO2 07/10/2024 27     ANION GAP 07/10/2024 9     BUN 07/10/2024 16     Creatinine 07/10/2024 0.92     Glucose 07/10/2024 87     Glucose, Fasting 07/10/2024 87     Calcium 07/10/2024 9.3     AST 07/10/2024 28     ALT 07/10/2024 36     Alkaline Phosphatase 07/10/2024 66     Total Protein 07/10/2024 7.7     Albumin 07/10/2024 4.4     Total Bilirubin 07/10/2024 0.55     eGFR 07/10/2024 104     Free T4 07/10/2024 0.54 (L)     Cholesterol 08/09/2024 159     Triglycerides 08/09/2024 88     HDL, Direct 08/09/2024 54     LDL Calculated 08/09/2024 87     Non-HDL-Chol (CHOL-HDL) 08/09/2024 105        Counseling / Coordination of Care  Total floor / unit time spent today 20 minutes. Greater than 50% of total time was spent with the patient and / or family counseling and / or coordination of care. A description of the counseling / coordination of care: medications, treatment progress and treatment plan reviewed with patient.

## 2024-08-30 NOTE — NURSING NOTE
Patient periodically out on the unit for meals, meds, and needs.  He denies SI/HI and A/V hallucinations.  Patient appears depressed but states his mood is good.  Patient attends group and is appropriate.

## 2024-08-30 NOTE — PLAN OF CARE
Problem: Ineffective Coping  Goal: Identifies ineffective coping skills  Outcome: Progressing  Goal: Identifies healthy coping skills  Outcome: Progressing  Goal: Demonstrates healthy coping skills  Outcome: Progressing  Goal: Participates in unit activities  Description: Interventions:  - Provide therapeutic environment   - Provide required programming   - Redirect inappropriate behaviors   Outcome: Progressing     Problem: Risk for Self Injury/Neglect  Goal: Treatment Goal: Remain safe during length of stay, learn and adopt new coping skills, and be free of self-injurious ideation, impulses and acts at the time of discharge  Outcome: Progressing     Problem: Depression  Goal: Treatment Goal: Demonstrate behavioral control of depressive symptoms, verbalize feelings of improved mood/affect, and adopt new coping skills prior to discharge  Outcome: Progressing  Goal: Verbalize thoughts and feelings  Description: Interventions:  - Assess and re-assess patient's level of risk   - Engage patient in 1:1 interactions, daily, for a minimum of 15 minutes   - Encourage patient to express feelings, fears, frustrations, hopes   Outcome: Progressing  Goal: Refrain from harming self  Description: Interventions:  - Monitor patient closely, per order   - Supervise medication ingestion, monitor effects and side effects   Outcome: Progressing  Goal: Refrain from isolation  Description: Interventions:  - Develop a trusting relationship   - Encourage socialization   Outcome: Progressing  Goal: Refrain from self-neglect  Outcome: Progressing  Goal: Attend and participate in unit activities, including therapeutic, recreational, and educational groups  Description: Interventions:  - Provide therapeutic and educational activities daily, encourage attendance and participation, and document same in the medical record   Outcome: Progressing  Goal: Complete daily ADLs, including personal hygiene independently, as able  Description:  Interventions:  - Observe, teach, and assist patient with ADLS  -  Monitor and promote a balance of rest/activity, with adequate nutrition and elimination   Outcome: Progressing     Problem: Anxiety  Goal: Anxiety is at manageable level  Description: Interventions:  - Assess and monitor patient's anxiety level.   - Monitor for signs and symptoms (heart palpitations, chest pain, shortness of breath, headaches, nausea, feeling jumpy, restlessness, irritable, apprehensive).   - Collaborate with interdisciplinary team and initiate plan and interventions as ordered.  - [place] patient to unit/surroundings  - Explain treatment plan  - Encourage participation in care  - Encourage verbalization of concerns/fears  - Identify coping mechanisms  - Assist in developing anxiety-reducing skills  - Administer/offer alternative therapies  - Limit or eliminate stimulants  Outcome: Progressing     Problem: DISCHARGE PLANNING - CARE MANAGEMENT  Goal: Discharge to post-acute care or home with appropriate resources  Description: INTERVENTIONS:  - Conduct assessment to determine patient/family and health care team treatment goals, and need for post-acute services based on payer coverage, community resources, and patient preferences, and barriers to discharge  - Address psychosocial, clinical, and financial barriers to discharge as identified in assessment in conjunction with the patient/family and health care team  - Arrange appropriate level of post-acute services according to patient’s   needs and preference and payer coverage in collaboration with the physician and health care team  - Communicate with and update the patient/family, physician, and health care team regarding progress on the discharge plan  - Arrange appropriate transportation to post-acute venues  Outcome: Progressing

## 2024-08-30 NOTE — PROGRESS NOTES
08/30/24 0851   Team Meeting   Meeting Type Daily Rounds   Team Members Present   Team Members Present Physician;Nurse;   Physician Team Member Bienvenido/Mendelson   Nursing Team Member Maryолег   Care Management Team Member Suhas   Patient/Family Present   Patient Present No   Patient's Family Present No     Pt med/meal compliant. Visible on the unit. Pleasant, calm, cooperative. Discharge pending placement.

## 2024-08-30 NOTE — NURSING NOTE
Pt visible on unit and resting in room. Mainly quiet and to self. Appears depressed. Pleasant on approach and denies all symptoms. Calm and cooperative with care. Utilized both PRN benadryl and hydrocortisone creams with stated good effect, pt thankful. Safety checks continue.

## 2024-08-30 NOTE — PROGRESS NOTES
"Progress Note - Behavioral Health   Franco Roberson 39 y.o. male MRN: [id number]  Unit/Bed#: U 345-02 Encounter: [id number]      Assessment & Plan   Principal Problem:    MDD (major depressive disorder), recurrent severe, without psychosis (HCC)  Active Problems:    Unspecified depressive disorder (HCC)    Medical clearance for psychiatric admission    Hyperlipidemia    Vitamin D deficiency    B12 deficiency    Hypothyroidism    Rule Out Autism spectrum disorder      Subjective:  Patient was seen today for continuation of care, records reviewed and patient was discussed with the morning case review team.  Per staff, patient remains meal and medication compliant.  Awaiting group home placement.     Franco was seen today for psychiatric follow-up.  On assessment today, Franco was seen resting in bed in his room.  Franco brightened on approach.  He continues to wait for placement.  He feels his mood is \"pretty good\".  He has been sleeping and eating well.  He has been coloring and playing chess on the unit as coping skills.   Franco denies acute suicidal/self-harm ideation/intent/plan upon direct inquiry at this time.  Franco remains behaviorally appropriate, no agitation or aggression noted on exam or in report.  Franco also denies HI/AH/VH, and does not appear overtly manic nor does he present as internally preoccupied.  No overt delusions or paranoia are verbalized.  Franco remains adherent to his current psychotropic medication regimen and denies any side effects from medications, as well as none noted on exam.    Recommended Treatment: Treatment plan and medication changes discussed and per the attending physician the plan is:    1.Continue with group therapy, milieu therapy and occupational therapy  2.Behavioral Health checks every 7 minutes  3.Continue frequent safety checks and vitals per unit protocol  4.Continue with SLIM medical management as indicated  5.Continue with current medication regimen:  Zoloft 150mg " "PO daily and Abilify 5mg PO daily.  6.Will review labs in the a.m.  7.Disposition Planning: Discharge planning and efforts remain ongoing, gabriela is awaiting placement    Vitals:  Vitals:    08/30/24 0736   BP: 112/64   Pulse: 64   Resp: 16   Temp: (!) 96.6 °F (35.9 °C)   SpO2: 97%       Laboratory Results:  I have personally reviewed all pertinent laboratory/tests results.  Most Recent Labs:   Lab Results   Component Value Date    WBC 7.01 06/27/2024    RBC 4.54 06/27/2024    HGB 14.5 06/27/2024    HCT 44.4 06/27/2024     06/27/2024    RDW 12.0 06/27/2024    NEUTROABS 4.01 06/27/2024    SODIUM 139 07/10/2024    K 3.8 07/10/2024     07/10/2024    CO2 27 07/10/2024    BUN 16 07/10/2024    CREATININE 0.92 07/10/2024    GLUC 87 07/10/2024    GLUF 87 07/10/2024    CALCIUM 9.3 07/10/2024    AST 28 07/10/2024    ALT 36 07/10/2024    ALKPHOS 66 07/10/2024    TP 7.7 07/10/2024    ALB 4.4 07/10/2024    TBILI 0.55 07/10/2024    CHOLESTEROL 159 08/09/2024    HDL 54 08/09/2024    TRIG 88 08/09/2024    LDLCALC 87 08/09/2024    NONHDLC 105 08/09/2024    XRV1SHFTTHRC 5.705 (H) 07/10/2024    FREET4 0.54 (L) 07/10/2024       Psychiatric Review of Systems:  Behavior over the last 24 hours:  unchanged.   Sleep: adequate  Appetite: adequate  Medication side effects: denies  ROS: no complaints, denies shortness of breath or chest pain and all other systems are negative for acute changes    Mental Status Evaluation:    Appearance:  casually dressed, marginal hygiene, looks stated age   Behavior:  pleasant, cooperative, calm   Speech:  normal rate and volume, fluent, clear   Mood:  \"Pretty good\"   Affect:  constricted   Thought Process:  goal directed, linear   Associations: intact associations   Thought Content:  no overt delusions   Perceptual Disturbances: none   Risk Potential: Suicidal ideation - None  Homicidal ideation - None  Potential for aggression - No   Sensorium:  oriented to person, place, time/date, and " situation   Memory:  recent and remote memory grossly intact   Consciousness:  alert and awake   Attention/Concentration: attention span and concentration are age appropriate   Insight:  partial   Judgment: partial   Gait/Station: normal gait/station   Motor Activity: no abnormal movements     Progress Toward Goals:   This patient is stable and appears to be at baseline, however, requires ongoing hospitalization due to continued poor insight, judgement, and coping that poses a risk to patient in an unsupervised setting. Without medication management in place, patient is likely to decompensate rapidly and become a risk of danger to self or others.      Risk of Harm to Self:   Nursing Suicide Risk Assessment Last 24 hours: C-SSRS Risk (Since Last Contact)  Calculated C-SSRS Risk Score (Since Last Contact): No Risk Indicated  Current Specific Risk Factors include: diagnosis of mood disorder  Protective Factors: no current suicidal ideation, ability to communicate with staff on the unit, able to contract for safety on the unit, taking medications as ordered on the unit, improved mood, responds to redirection  Based on today's assessment, Franco presents the following risk of harm to self: low    Risk of Harm to Others:  Nursing Homicide Risk Assessment: Violence Risk to Others: Denies within past 6 months  Current Specific Risk Factors include: multiple stressors, social difficulties  Protective Factors: no current homicidal ideation, compliant with medications on the unit as ordered, compliant with unit milieu, follows staff redirection, willing to continue psychiatric treatment, willing to remain free from substance use  Based on today's assessment, Franco presents the following risk of harm to others: low    The following interventions are recommended: behavioral checks every 7 minutes, continued hospitalization on locked unit        Behavioral Health Medications: all current active meds have been reviewed and  continue current psychiatric medications.  Current Facility-Administered Medications   Medication Dose Route Frequency Provider Last Rate    acetaminophen  650 mg Oral Q6H PRN Basilio Brown MD      acetaminophen  650 mg Oral Q4H PRN Basilio Brown MD      acetaminophen  975 mg Oral Q6H PRN Basilio Brown MD      aluminum-magnesium hydroxide-simethicone  30 mL Oral Q4H PRN Basilio Brown MD      ARIPiprazole  5 mg Oral Daily Basilio Panda MD      Artificial Tears  1 drop Both Eyes Q3H PRN Basilio Brown MD      atorvastatin  10 mg Oral Daily With Dinner WALLY Baptiste      benztropine  1 mg Intramuscular Q4H PRN Max 6/day Basilio Brown MD      benztropine  1 mg Oral Q4H PRN Max 6/day Basilio Brown MD      Cholecalciferol  2,000 Units Oral Daily WALLY Baptiste      cyanocobalamin  1,000 mcg Oral Daily WALLY Baptiste      Diclofenac Sodium  2 g Topical 4x Daily PRN WALLY Baptiste      hydrOXYzine HCL  50 mg Oral Q6H PRN Max 4/day Basilio Brown MD      Or    diphenhydrAMINE  50 mg Intramuscular Q6H PRN Basilio Brown MD      diphenhydrAMINE-zinc acetate   Topical Daily PRN Raj Guerrero MD      famotidine  20 mg Oral BID WALLY Baptiste      hydrocortisone   Topical 4x Daily PRN WALLY Baptiste      hydrOXYzine HCL  100 mg Oral Q6H PRN Max 4/day Basilio Brown MD      Or    LORazepam  2 mg Intramuscular Q6H PRN Basilio Brown MD      hydrOXYzine HCL  25 mg Oral Q6H PRN Max 4/day Basilio Brown MD      levothyroxine  37.5 mcg Oral Early Morning WALLY Baptiste      melatonin  6 mg Oral HS PRN WALLY Gomez      methocarbamol  500 mg Oral Q6H PRN WALLY Baptiste      OLANZapine  5 mg Oral Q4H PRN Max 3/day Basilio Brown MD      Or    OLANZapine  2.5 mg Intramuscular Q4H PRN Max 3/day Basilio Brown MD      OLANZapine  5  mg Oral Q3H PRN Max 3/day Basilio Brown MD      Or    OLANZapine  5 mg Intramuscular Q3H PRN Max 3/day Basilio Brown MD      OLANZapine  2.5 mg Oral Q4H PRN Max 6/day Basilio Brown MD      polyethylene glycol  17 g Oral Daily PRN Basilio Brown MD      propranolol  10 mg Oral Q8H PRN Basilio Brown MD      senna-docusate sodium  1 tablet Oral Daily PRN Basilio Brown MD      sertraline  150 mg Oral Daily WALLY Gomez      traZODone  50 mg Oral HS PRN Basilio Brown MD         Risks / Benefits of Treatment:  Risks, benefits, and possible side effects of medications explained to patient and patient verbalizes understanding and agreement for treatment.    Counseling / Coordination of Care:  Patient's progress reviewed with nursing staff.  Medications, treatment progress and treatment plan reviewed with patient.  Supportive counseling provided to the patient.    Total floor/unit time spent today 25 minutes. Greater than 50% of total time was spent with the patient and / or family counseling and / or coordination of care. A description of the counseling / coordination of care: medication education, treatment plan, supportive therapy.

## 2024-08-31 PROCEDURE — 99232 SBSQ HOSP IP/OBS MODERATE 35: CPT | Performed by: PSYCHIATRY & NEUROLOGY

## 2024-08-31 RX ADMIN — CHOLECALCIFEROL TAB 25 MCG (1000 UNIT) 2000 UNITS: 25 TAB at 09:08

## 2024-08-31 RX ADMIN — CYANOCOBALAMIN TAB 1000 MCG 1000 MCG: 1000 TAB at 09:08

## 2024-08-31 RX ADMIN — ATORVASTATIN CALCIUM 10 MG: 10 TABLET, FILM COATED ORAL at 17:13

## 2024-08-31 RX ADMIN — FAMOTIDINE 20 MG: 20 TABLET ORAL at 09:08

## 2024-08-31 RX ADMIN — FAMOTIDINE 20 MG: 20 TABLET ORAL at 17:13

## 2024-08-31 RX ADMIN — ARIPIPRAZOLE 5 MG: 5 TABLET ORAL at 09:08

## 2024-08-31 RX ADMIN — SERTRALINE HYDROCHLORIDE 150 MG: 100 TABLET ORAL at 09:08

## 2024-08-31 RX ADMIN — LEVOTHYROXINE SODIUM 37.5 MCG: 125 TABLET ORAL at 06:44

## 2024-08-31 NOTE — PLAN OF CARE
Problem: Ineffective Coping  Goal: Identifies ineffective coping skills  Outcome: Progressing  Goal: Identifies healthy coping skills  Outcome: Progressing  Goal: Demonstrates healthy coping skills  Outcome: Progressing  Goal: Participates in unit activities  Description: Interventions:  - Provide therapeutic environment   - Provide required programming   - Redirect inappropriate behaviors   Outcome: Progressing     Problem: Risk for Self Injury/Neglect  Goal: Treatment Goal: Remain safe during length of stay, learn and adopt new coping skills, and be free of self-injurious ideation, impulses and acts at the time of discharge  Outcome: Progressing     Problem: Depression  Goal: Treatment Goal: Demonstrate behavioral control of depressive symptoms, verbalize feelings of improved mood/affect, and adopt new coping skills prior to discharge  Outcome: Progressing  Goal: Verbalize thoughts and feelings  Description: Interventions:  - Assess and re-assess patient's level of risk   - Engage patient in 1:1 interactions, daily, for a minimum of 15 minutes   - Encourage patient to express feelings, fears, frustrations, hopes   Outcome: Progressing  Goal: Refrain from harming self  Description: Interventions:  - Monitor patient closely, per order   - Supervise medication ingestion, monitor effects and side effects   Outcome: Progressing  Goal: Refrain from isolation  Description: Interventions:  - Develop a trusting relationship   - Encourage socialization   Outcome: Progressing  Goal: Refrain from self-neglect  Outcome: Progressing  Goal: Attend and participate in unit activities, including therapeutic, recreational, and educational groups  Description: Interventions:  - Provide therapeutic and educational activities daily, encourage attendance and participation, and document same in the medical record   Outcome: Progressing  Goal: Complete daily ADLs, including personal hygiene independently, as able  Description:  Interventions:  - Observe, teach, and assist patient with ADLS  -  Monitor and promote a balance of rest/activity, with adequate nutrition and elimination   Outcome: Progressing     Problem: Anxiety  Goal: Anxiety is at manageable level  Description: Interventions:  - Assess and monitor patient's anxiety level.   - Monitor for signs and symptoms (heart palpitations, chest pain, shortness of breath, headaches, nausea, feeling jumpy, restlessness, irritable, apprehensive).   - Collaborate with interdisciplinary team and initiate plan and interventions as ordered.  - [place] patient to unit/surroundings  - Explain treatment plan  - Encourage participation in care  - Encourage verbalization of concerns/fears  - Identify coping mechanisms  - Assist in developing anxiety-reducing skills  - Administer/offer alternative therapies  - Limit or eliminate stimulants  Outcome: Progressing     Problem: DISCHARGE PLANNING - CARE MANAGEMENT  Goal: Discharge to post-acute care or home with appropriate resources  Description: INTERVENTIONS:  - Conduct assessment to determine patient/family and health care team treatment goals, and need for post-acute services based on payer coverage, community resources, and patient preferences, and barriers to discharge  - Address psychosocial, clinical, and financial barriers to discharge as identified in assessment in conjunction with the patient/family and health care team  - Arrange appropriate level of post-acute services according to patient’s   needs and preference and payer coverage in collaboration with the physician and health care team  - Communicate with and update the patient/family, physician, and health care team regarding progress on the discharge plan  - Arrange appropriate transportation to post-acute venues  Outcome: Progressing     Problem: Knowledge Deficit  Goal: Patient/family/caregiver demonstrates understanding of disease process, treatment plan, medications, and discharge  instructions  Description: Complete learning assessment and assess knowledge base.  Interventions:  - Provide teaching at level of understanding  - Provide teaching via preferred learning methods  Outcome: Progressing

## 2024-08-31 NOTE — NURSING NOTE
In the milieu for needs, but quiet and withdrawn overall. He denies depression, anxiety or S.I.H.I.A/H V/H

## 2024-08-31 NOTE — NURSING NOTE
"Patient has been visible for breakfast and medications.  He rests in his room afterwards.  He is cooperative and pleasant.  Patient reports a \"good\" mood.  He denies SI/HI and A/V hallucinations.  "

## 2024-09-01 PROCEDURE — 99232 SBSQ HOSP IP/OBS MODERATE 35: CPT | Performed by: PSYCHIATRY & NEUROLOGY

## 2024-09-01 RX ADMIN — ARIPIPRAZOLE 5 MG: 5 TABLET ORAL at 09:13

## 2024-09-01 RX ADMIN — ATORVASTATIN CALCIUM 10 MG: 10 TABLET, FILM COATED ORAL at 17:12

## 2024-09-01 RX ADMIN — LEVOTHYROXINE SODIUM 37.5 MCG: 125 TABLET ORAL at 06:37

## 2024-09-01 RX ADMIN — FAMOTIDINE 20 MG: 20 TABLET ORAL at 09:14

## 2024-09-01 RX ADMIN — SERTRALINE HYDROCHLORIDE 150 MG: 100 TABLET ORAL at 09:13

## 2024-09-01 RX ADMIN — FAMOTIDINE 20 MG: 20 TABLET ORAL at 17:12

## 2024-09-01 RX ADMIN — CHOLECALCIFEROL TAB 25 MCG (1000 UNIT) 2000 UNITS: 25 TAB at 09:13

## 2024-09-01 RX ADMIN — CYANOCOBALAMIN TAB 1000 MCG 1000 MCG: 1000 TAB at 09:13

## 2024-09-01 NOTE — PROGRESS NOTES
Progress Note - Behavioral Health   Franco Roberson 39 y.o. male MRN: [id number]  Unit/Bed#: U 345-02 Encounter: [id number]    Behavior over the last 24 hours:  unchanged.   Sleep: normal  Appetite: normal  Medication side effects: No  ROS: no complaints    Mental Status Evaluation:  Appearance:  casually dressed and marginal hygiene   Behavior:  Cooperative, friendly   Speech:  soft and scant   Mood:  normal   Affect:  constricted   Thought Process:  goal directed   Thought Content:  No overt delusions   Perceptual Disturbances: None   Risk Potential: No suicidal or homicidal thoughts reported   Sensorium:  person, place, and situation   Cognition:  recent and remote memory grossly intact   Consciousness:  alert and awake    Attention: attention span and concentration were age appropriate   Insight:  fair   Judgment: fair   Gait/Station: In bed   Motor Activity: no abnormal movements       Progress Toward Goals: RN reported patient able to hold gaze longer than during prior interactions and this was also the case with this writer. He is hopefull for something positive from the [facility] meeting and denies all sxs. Encouraged to attend groups .    Wt Readings from Last 3 Encounters:   09/01/24 131 kg (289 lb 6.4 oz)   05/14/24 128 kg (281 lb 6.4 oz)   03/17/17 118 kg (260 lb)     Temp Readings from Last 3 Encounters:   09/01/24 (!) 97.3 °F (36.3 °C) (Temporal)   05/14/24 98 °F (36.7 °C)   03/17/17 98.8 °F (37.1 °C) (Tympanic)     BP Readings from Last 3 Encounters:   09/01/24 112/59   05/14/24 140/93   03/17/17 137/67     Pulse Readings from Last 3 Encounters:   09/01/24 63   05/14/24 72   03/17/17 94         Assessment & Plan   Principal Problem:    MDD (major depressive disorder), recurrent severe, without psychosis (HCC)  Active Problems:    Unspecified depressive disorder (HCC)    Medical clearance for psychiatric admission    Hyperlipidemia    Vitamin D deficiency    B12 deficiency    Hypothyroidism    Rule  Out Autism spectrum disorder      Recommended Treatment: Continue with group therapy, milieu therapy and occupational therapy.      Risks, benefits and possible side effects of Medications:   Risks, benefits, and possible side effects of medications explained to patient and patient verbalizes understanding.      Medications:       all current active meds have been reviewed, continue current psychiatric medications, and current meds:   Current Facility-Administered Medications   Medication Dose Route Frequency    acetaminophen (TYLENOL) tablet 650 mg  650 mg Oral Q6H PRN    acetaminophen (TYLENOL) tablet 650 mg  650 mg Oral Q4H PRN    acetaminophen (TYLENOL) tablet 975 mg  975 mg Oral Q6H PRN    aluminum-magnesium hydroxide-simethicone (MAALOX) oral suspension 30 mL  30 mL Oral Q4H PRN    ARIPiprazole (ABILIFY) tablet 5 mg  5 mg Oral Daily    Artificial Tears ophthalmic solution 1 drop  1 drop Both Eyes Q3H PRN    atorvastatin (LIPITOR) tablet 10 mg  10 mg Oral Daily With Dinner    benztropine (COGENTIN) injection 1 mg  1 mg Intramuscular Q4H PRN Max 6/day    benztropine (COGENTIN) tablet 1 mg  1 mg Oral Q4H PRN Max 6/day    Cholecalciferol (VITAMIN D3) tablet 2,000 Units  2,000 Units Oral Daily    cyanocobalamin (VITAMIN B-12) tablet 1,000 mcg  1,000 mcg Oral Daily    Diclofenac Sodium (VOLTAREN) 1 % topical gel 2 g  2 g Topical 4x Daily PRN    hydrOXYzine HCL (ATARAX) tablet 50 mg  50 mg Oral Q6H PRN Max 4/day    Or    diphenhydrAMINE (BENADRYL) injection 50 mg  50 mg Intramuscular Q6H PRN    diphenhydrAMINE-zinc acetate (BENADRYL) 2-0.1 % cream   Topical Daily PRN    famotidine (PEPCID) tablet 20 mg  20 mg Oral BID    hydrocortisone 1 % cream   Topical 4x Daily PRN    hydrOXYzine HCL (ATARAX) tablet 100 mg  100 mg Oral Q6H PRN Max 4/day    Or    LORazepam (ATIVAN) injection 2 mg  2 mg Intramuscular Q6H PRN    hydrOXYzine HCL (ATARAX) tablet 25 mg  25 mg Oral Q6H PRN Max 4/day    levothyroxine tablet 37.5 mcg  37.5  mcg Oral Early Morning    melatonin tablet 6 mg  6 mg Oral HS PRN    methocarbamol (ROBAXIN) tablet 500 mg  500 mg Oral Q6H PRN    OLANZapine (ZyPREXA) tablet 5 mg  5 mg Oral Q4H PRN Max 3/day    Or    OLANZapine (ZyPREXA) IM injection 2.5 mg  2.5 mg Intramuscular Q4H PRN Max 3/day    OLANZapine (ZyPREXA) tablet 5 mg  5 mg Oral Q3H PRN Max 3/day    Or    OLANZapine (ZyPREXA) IM injection 5 mg  5 mg Intramuscular Q3H PRN Max 3/day    OLANZapine (ZyPREXA) tablet 2.5 mg  2.5 mg Oral Q4H PRN Max 6/day    polyethylene glycol (MIRALAX) packet 17 g  17 g Oral Daily PRN    propranolol (INDERAL) tablet 10 mg  10 mg Oral Q8H PRN    senna-docusate sodium (SENOKOT S) 8.6-50 mg per tablet 1 tablet  1 tablet Oral Daily PRN    sertraline (ZOLOFT) tablet 150 mg  150 mg Oral Daily    traZODone (DESYREL) tablet 50 mg  50 mg Oral HS PRN   .    Labs:  Reviewed  Admission on 05/14/2024   Component Date Value    Sodium 05/15/2024 142     Potassium 05/15/2024 4.1     Chloride 05/15/2024 106     CO2 05/15/2024 28     ANION GAP 05/15/2024 8     BUN 05/15/2024 13     Creatinine 05/15/2024 0.97     Glucose 05/15/2024 89     Glucose, Fasting 05/15/2024 89     Calcium 05/15/2024 9.3     AST 05/15/2024 21     ALT 05/15/2024 19     Alkaline Phosphatase 05/15/2024 66     Total Protein 05/15/2024 7.6     Albumin 05/15/2024 4.3     Total Bilirubin 05/15/2024 0.69     eGFR 05/15/2024 97     WBC 05/15/2024 5.42     RBC 05/15/2024 5.05     Hemoglobin 05/15/2024 16.1     Hematocrit 05/15/2024 49.4 (H)     MCV 05/15/2024 98     MCH 05/15/2024 31.9     MCHC 05/15/2024 32.6     RDW 05/15/2024 12.6     MPV 05/15/2024 9.2     Platelets 05/15/2024 268     nRBC 05/15/2024 0     Segmented % 05/15/2024 53     Immature Grans % 05/15/2024 0     Lymphocytes % 05/15/2024 38     Monocytes % 05/15/2024 7     Eosinophils Relative 05/15/2024 2     Basophils Relative 05/15/2024 0     Absolute Neutrophils 05/15/2024 2.83     Absolute Immature Grans 05/15/2024 0.01      Absolute Lymphocytes 05/15/2024 2.06     Absolute Monocytes 05/15/2024 0.40     Eosinophils Absolute 05/15/2024 0.10     Basophils Absolute 05/15/2024 0.02     TSH 3RD GENERATON 05/15/2024 5.162 (H)     Vitamin B-12 05/15/2024 168 (L)     Folate 05/15/2024 >22.3     Vit D, 25-Hydroxy 05/15/2024 11.6 (L)     Cholesterol 05/15/2024 191     Triglycerides 05/15/2024 116     HDL, Direct 05/15/2024 44     LDL Calculated 05/15/2024 124 (H)     Non-HDL-Chol (CHOL-HDL) 05/15/2024 147     Syphilis Total Antibody 05/15/2024 Non-reactive     Free T4 05/15/2024 0.60 (L)     Ventricular Rate 05/15/2024 66     Atrial Rate 05/15/2024 66     NC Interval 05/15/2024 146     QRSD Interval 05/15/2024 70     QT Interval 05/15/2024 402     QTC Interval 05/15/2024 421     P Axis 05/15/2024 39     QRS Axis 05/15/2024 48     T Wave Axis 05/15/2024 41     Ventricular Rate 05/15/2024 61     Atrial Rate 05/15/2024 61     NC Interval 05/15/2024 188     QRSD Interval 05/15/2024 92     QT Interval 05/15/2024 426     QTC Interval 05/15/2024 428     P Axis 05/15/2024 56     QRS Axis 05/15/2024 50     T Wave Axis 05/15/2024 46     Color, UA 06/26/2024 Yellow     Clarity, UA 06/26/2024 Slightly Cloudy (A)     Specific [place], UA 06/26/2024 1.020     pH, UA 06/26/2024 6.0     Leukocytes, UA 06/26/2024 Negative     Nitrite, UA 06/26/2024 Negative     Protein, UA 06/26/2024 15 (Trace) (A)     Glucose, UA 06/26/2024 Negative     Ketones, UA 06/26/2024 Negative     Bilirubin, UA 06/26/2024 Negative     Occult Blood, UA 06/26/2024 Negative     UROBILINOGEN UA 06/26/2024 Negative     RBC, UA 06/26/2024 None Seen     WBC, UA 06/26/2024 None Seen     Epithelial Cells 06/26/2024 None Seen     Bacteria, UA 06/26/2024 Moderate (A)     AMORPH URATES 06/26/2024 Moderate     MUCUS THREADS 06/26/2024 Occasional (A)     Sodium 06/27/2024 138     Potassium 06/27/2024 4.5     Chloride 06/27/2024 101     CO2 06/27/2024 30     ANION GAP 06/27/2024 7     BUN 06/27/2024 21      Creatinine 06/27/2024 1.02     Glucose 06/27/2024 85     Calcium 06/27/2024 9.7     AST 06/27/2024 20     ALT 06/27/2024 22     Alkaline Phosphatase 06/27/2024 68     Total Protein 06/27/2024 7.9     Albumin 06/27/2024 4.4     Total Bilirubin 06/27/2024 0.32     eGFR 06/27/2024 92     WBC 06/27/2024 7.01     RBC 06/27/2024 4.54     Hemoglobin 06/27/2024 14.5     Hematocrit 06/27/2024 44.4     MCV 06/27/2024 98     MCH 06/27/2024 31.9     MCHC 06/27/2024 32.7     RDW 06/27/2024 12.0     MPV 06/27/2024 8.9     Platelets 06/27/2024 249     nRBC 06/27/2024 0     Segmented % 06/27/2024 58     Immature Grans % 06/27/2024 0     Lymphocytes % 06/27/2024 31     Monocytes % 06/27/2024 9     Eosinophils Relative 06/27/2024 2     Basophils Relative 06/27/2024 0     Absolute Neutrophils 06/27/2024 4.01     Absolute Immature Grans 06/27/2024 0.02     Absolute Lymphocytes 06/27/2024 2.14     Absolute Monocytes 06/27/2024 0.65     Eosinophils Absolute 06/27/2024 0.17     Basophils Absolute 06/27/2024 0.02     QFT Nil 07/03/2024 0.03     QFT TB1-NIL 07/03/2024 0.02     QFT TB2-NIL 07/03/2024 0.04     QFT Mitogen-NIL 07/03/2024 9.27     QFT Final Interpretation 07/03/2024 Negative     Cholesterol 07/05/2024 175     Triglycerides 07/05/2024 99     HDL, Direct 07/05/2024 53     LDL Calculated 07/05/2024 102 (H)     Non-HDL-Chol (CHOL-HDL) 07/05/2024 122     Clarity, UA 07/09/2024 Clear     Color, UA 07/09/2024 Straw     Specific [place], UA 07/09/2024 1.015     pH, UA 07/09/2024 6.0     Glucose, UA 07/09/2024 Negative     Ketones, UA 07/09/2024 Negative     Occult Blood, UA 07/09/2024 Negative     Protein, UA 07/09/2024 Negative     Nitrite, UA 07/09/2024 Negative     Bilirubin, UA 07/09/2024 Negative     Leukocytes, UA 07/09/2024 Negative     WBC, UA 07/09/2024 None Seen     RBC, UA 07/09/2024 None Seen     Bacteria, UA 07/09/2024 None Seen     Epithelial Cells 07/09/2024 None Seen     UROBILINOGEN UA 07/09/2024 Negative     Urine  Culture 07/09/2024 No Growth <1000 cfu/mL     TSH 3RD GENERATON 07/10/2024 5.705 (H)     Sodium 07/10/2024 139     Potassium 07/10/2024 3.8     Chloride 07/10/2024 103     CO2 07/10/2024 27     ANION GAP 07/10/2024 9     BUN 07/10/2024 16     Creatinine 07/10/2024 0.92     Glucose 07/10/2024 87     Glucose, Fasting 07/10/2024 87     Calcium 07/10/2024 9.3     AST 07/10/2024 28     ALT 07/10/2024 36     Alkaline Phosphatase 07/10/2024 66     Total Protein 07/10/2024 7.7     Albumin 07/10/2024 4.4     Total Bilirubin 07/10/2024 0.55     eGFR 07/10/2024 104     Free T4 07/10/2024 0.54 (L)     Cholesterol 08/09/2024 159     Triglycerides 08/09/2024 88     HDL, Direct 08/09/2024 54     LDL Calculated 08/09/2024 87     Non-HDL-Chol (CHOL-HDL) 08/09/2024 105        Counseling / Coordination of Care  Total floor / unit time spent today 20 minutes. Greater than 50% of total time was spent with the patient and / or family counseling and / or coordination of care. A description of the counseling / coordination of care: medications, treatment progress and treatment plan reviewed with patient.

## 2024-09-01 NOTE — PLAN OF CARE
Problem: Ineffective Coping  Goal: Identifies ineffective coping skills  Outcome: Progressing  Goal: Identifies healthy coping skills  Outcome: Progressing  Goal: Demonstrates healthy coping skills  Outcome: Progressing  Goal: Participates in unit activities  Description: Interventions:  - Provide therapeutic environment   - Provide required programming   - Redirect inappropriate behaviors   Outcome: Progressing  Note: Attending groups infrequently     Problem: Risk for Self Injury/Neglect  Goal: Treatment Goal: Remain safe during length of stay, learn and adopt new coping skills, and be free of self-injurious ideation, impulses and acts at the time of discharge  Outcome: Progressing     Problem: Depression  Goal: Treatment Goal: Demonstrate behavioral control of depressive symptoms, verbalize feelings of improved mood/affect, and adopt new coping skills prior to discharge  Outcome: Progressing  Goal: Verbalize thoughts and feelings  Description: Interventions:  - Assess and re-assess patient's level of risk   - Engage patient in 1:1 interactions, daily, for a minimum of 15 minutes   - Encourage patient to express feelings, fears, frustrations, hopes   Outcome: Progressing  Goal: Refrain from harming self  Description: Interventions:  - Monitor patient closely, per order   - Supervise medication ingestion, monitor effects and side effects   Outcome: Progressing  Goal: Refrain from isolation  Description: Interventions:  - Develop a trusting relationship   - Encourage socialization   Outcome: Progressing  Goal: Refrain from self-neglect  Outcome: Progressing  Goal: Attend and participate in unit activities, including therapeutic, recreational, and educational groups  Description: Interventions:  - Provide therapeutic and educational activities daily, encourage attendance and participation, and document same in the medical record   Outcome: Progressing  Goal: Complete daily ADLs, including personal hygiene  independently, as able  Description: Interventions:  - Observe, teach, and assist patient with ADLS  -  Monitor and promote a balance of rest/activity, with adequate nutrition and elimination   Outcome: Progressing     Problem: Depression  Goal: Treatment Goal: Demonstrate behavioral control of depressive symptoms, verbalize feelings of improved mood/affect, and adopt new coping skills prior to discharge  Outcome: Progressing  Goal: Verbalize thoughts and feelings  Description: Interventions:  - Assess and re-assess patient's level of risk   - Engage patient in 1:1 interactions, daily, for a minimum of 15 minutes   - Encourage patient to express feelings, fears, frustrations, hopes   Outcome: Progressing  Goal: Refrain from harming self  Description: Interventions:  - Monitor patient closely, per order   - Supervise medication ingestion, monitor effects and side effects   Outcome: Progressing  Goal: Refrain from isolation  Description: Interventions:  - Develop a trusting relationship   - Encourage socialization   Outcome: Progressing  Goal: Refrain from self-neglect  Outcome: Progressing  Goal: Attend and participate in unit activities, including therapeutic, recreational, and educational groups  Description: Interventions:  - Provide therapeutic and educational activities daily, encourage attendance and participation, and document same in the medical record   Outcome: Progressing  Goal: Complete daily ADLs, including personal hygiene independently, as able  Description: Interventions:  - Observe, teach, and assist patient with ADLS  -  Monitor and promote a balance of rest/activity, with adequate nutrition and elimination   Outcome: Progressing     Problem: Anxiety  Goal: Anxiety is at manageable level  Description: Interventions:  - Assess and monitor patient's anxiety level.   - Monitor for signs and symptoms (heart palpitations, chest pain, shortness of breath, headaches, nausea, feeling jumpy, restlessness,  irritable, apprehensive).   - Collaborate with interdisciplinary team and initiate plan and interventions as ordered.  - [place] patient to unit/surroundings  - Explain treatment plan  - Encourage participation in care  - Encourage verbalization of concerns/fears  - Identify coping mechanisms  - Assist in developing anxiety-reducing skills  - Administer/offer alternative therapies  - Limit or eliminate stimulants  Outcome: Progressing     Problem: Knowledge Deficit  Goal: Patient/family/caregiver demonstrates understanding of disease process, treatment plan, medications, and discharge instructions  Description: Complete learning assessment and assess knowledge base.  Interventions:  - Provide teaching at level of understanding  - Provide teaching via preferred learning methods  Outcome: Progressing     Problem: SLEEP DISTURBANCE  Goal: Will exhibit normal sleeping pattern  Description: Interventions:  -  Assess the patients sleep pattern, noting recent changes  - Administer medication as ordered  - Decrease environmental stimuli, including noise, as appropriate during the night  - Encourage the patient to actively participate in unit groups and or exercise during the day to enhance ability to achieve adequate sleep at night  - Assess the patient, in the morning, encouraging a description of sleep experience  Outcome: Not Progressing

## 2024-09-01 NOTE — NURSING NOTE
"Patient is visible on the unit, med/meal compliant.  He states he slept and feels \"good.\"  He denies SI/HI and A/V hallucinations. Patient was able to hold eye contact for longer then he normally has.   "

## 2024-09-01 NOTE — NURSING NOTE
Patient has been in the milieu this evening for a short time but minimally interactive. He denies S.I.H.I A/H V/H

## 2024-09-02 PROCEDURE — 99232 SBSQ HOSP IP/OBS MODERATE 35: CPT | Performed by: STUDENT IN AN ORGANIZED HEALTH CARE EDUCATION/TRAINING PROGRAM

## 2024-09-02 RX ADMIN — SERTRALINE HYDROCHLORIDE 150 MG: 100 TABLET ORAL at 08:21

## 2024-09-02 RX ADMIN — CYANOCOBALAMIN TAB 1000 MCG 1000 MCG: 1000 TAB at 08:21

## 2024-09-02 RX ADMIN — FAMOTIDINE 20 MG: 20 TABLET ORAL at 08:21

## 2024-09-02 RX ADMIN — CHOLECALCIFEROL TAB 25 MCG (1000 UNIT) 2000 UNITS: 25 TAB at 08:21

## 2024-09-02 RX ADMIN — ATORVASTATIN CALCIUM 10 MG: 10 TABLET, FILM COATED ORAL at 16:58

## 2024-09-02 RX ADMIN — LEVOTHYROXINE SODIUM 37.5 MCG: 125 TABLET ORAL at 08:20

## 2024-09-02 RX ADMIN — ARIPIPRAZOLE 5 MG: 5 TABLET ORAL at 08:21

## 2024-09-02 RX ADMIN — FAMOTIDINE 20 MG: 20 TABLET ORAL at 17:00

## 2024-09-02 NOTE — NURSING NOTE
Pt intermittently about unit but mainly quiet and to self. Interacts mainly with staff and brightens on approach. Denies all symptoms, needs and concerns. Safety checks ongoing.

## 2024-09-02 NOTE — NURSING NOTE
Pt is alert and able to make needs known. Denies c/o pain/discomfort. Denies SI/HI/AH/VH at this time. Intermittently visit on milieu. Calm. Med and meal compliant.

## 2024-09-02 NOTE — NURSING NOTE
Patient is isolative to room and self. Patient is calm and cooperative upon approach. Patient has a flat affect. Patient denies SI/HI/AH/VH. Patient is compliant with meds and meals

## 2024-09-02 NOTE — PROGRESS NOTES
"Progress Note - Behavioral Health   Franco Roberson 39 y.o. male MRN: [id number]  Unit/Bed#: [facility] 345-02 Encounter: [id number]    All documentation, nursing notes, labs, and vitals reviewed.  The patient's medication reconciliation chart was also analyzed for medication adherence.  I personally evaluated Franco Roberson and discussed current care with treatment team.    No acute events overnight. Franco was resting upon approach. He denies new onset psychiatric concerns. His sleep and appetite are stable. No current SI/HI. His energy and motivation are adequate. He does voice frustration with his length hospital stay and supportive therapy provided. He shares that he was denied from placement secondary to not having social security benefits. He was pleased to hear that a referral was sent to an agency in [facility]. Franco denies anhedonia, crying spells, or feelings of despair. Franco currently endorses occasional and appropriate anxiety that is not pathologic in nature. Franco denies excessive nervousness, irrational worry, or overt anxiousness. There is no evidence to suggest that Franco experiences irritability, inability to relax, or disruption in sleep secondary to baseline, non-pathologic anxiety. Franco denies new-onset panic symptomatology or maladaptive behaviors. Throughout today's session, Franco does not appear visibly perturbed. Acutely, Franco does not exhibit objective evidence of patrick/hypomania or psychosis. Franco is not currently irritable, grandiose, labile, or pathologically euphoric. Franco is without perceptual disturbances, such as A/V hallucinations, paranoia, ideas of reference, or delusional beliefs. Franco currently rates his overall state of MH as \"7/10\" (10 being best he ever felt, 0 being worst). Franco offers no further concerns.     Mental Status Evaluation:    Appearance:  disheveled, looks older than stated age, overweight   Behavior:  pleasant, cooperative, calm, fair eye contact "   Speech:  normal rate, normal volume, normal pitch   Mood:  euthymic   Affect:  constricted   Thought Process:  organized, logical, coherent   Associations: intact associations   Thought Content:  no overt delusions   Perceptual Disturbances: no auditory hallucinations, no visual hallucinations   Risk Potential: Suicidal ideation - None at present  Homicidal ideation - None at present  Potential for aggression - No   Sensorium:  oriented to person, place, and time/date   Memory:  recent and remote memory grossly intact   Consciousness:  alert and awake    Attention: attention span and concentration are age appropriate   Insight:  fair   Judgment: fair   Gait/Station: normal gait/station   Motor Activity: no abnormal movements       Assessment:     Principal Problem:    MDD (major depressive disorder), recurrent severe, without psychosis (HCC)  Active Problems:    Unspecified depressive disorder (HCC)    Medical clearance for psychiatric admission    Hyperlipidemia    Vitamin D deficiency    B12 deficiency    Hypothyroidism    Rule Out Autism spectrum disorder      Plan/Recommended Treatment:     - Continue with pharmacotherapy, group therapy, milieu therapy and occupational therapy.    - Risks/benefits/alternatives to treatment discussed and Franco Roberson continues to verbalize understanding   - No psychopharmacologic changes necessary at this juncture, continue scheduled psychotropic agents at current doses (see below)   - Will consider further optimization of psychotropic medication regimen as hospital course progresses   - Continue to assess for adverse medication side effects.  - Medical management as per SLIM recs  - Encourage Franco Roberson to participate in nonverbal forms of therapy including journaling and art/music therapy  - Continue precautionary Q7-minute safety checks.  - Continue to engage case management/SW to assist with collateral information, discharge planning, and the implementation of an  individualized, patient-centered plan of care.  - The patient will be maintained on the following medications:    Current Facility-Administered Medications   Medication Dose Route Frequency Provider Last Rate    acetaminophen  650 mg Oral Q6H PRN Basilio Brown MD      acetaminophen  650 mg Oral Q4H PRN Basilio Brown MD      acetaminophen  975 mg Oral Q6H PRN Basilio Brown MD      aluminum-magnesium hydroxide-simethicone  30 mL Oral Q4H PRN Basilio Brown MD      ARIPiprazole  5 mg Oral Daily Basilio Panda MD      Artificial Tears  1 drop Both Eyes Q3H PRN Basilio Brown MD      atorvastatin  10 mg Oral Daily With Dinner WALLY Baptiste      benztropine  1 mg Intramuscular Q4H PRN Max 6/day Basilio Brown MD      benztropine  1 mg Oral Q4H PRN Max 6/day Basilio Brown MD      Cholecalciferol  2,000 Units Oral Daily WALLY Baptiste      cyanocobalamin  1,000 mcg Oral Daily WALLY Baptiste      Diclofenac Sodium  2 g Topical 4x Daily PRN WALLY Baptiste      hydrOXYzine HCL  50 mg Oral Q6H PRN Max 4/day Basilio Brown MD      Or    diphenhydrAMINE  50 mg Intramuscular Q6H PRN Basilio Brown MD      diphenhydrAMINE-zinc acetate   Topical Daily PRN Raj Guerrero MD      famotidine  20 mg Oral BID WALLY Baptiste      hydrocortisone   Topical 4x Daily PRN WALLY Baptiste      hydrOXYzine HCL  100 mg Oral Q6H PRN Max 4/day Basilio Brown MD      Or    LORazepam  2 mg Intramuscular Q6H PRN Basilio Brown MD      hydrOXYzine HCL  25 mg Oral Q6H PRN Max 4/day Basilio Borwn MD      levothyroxine  37.5 mcg Oral Early Morning WALLY Baptiste      melatonin  6 mg Oral HS PRN WALLY Gomez      methocarbamol  500 mg Oral Q6H PRN WALLY Baptiste      OLANZapine  5 mg Oral Q4H PRN Max 3/day Basilio Brown MD      Or    OLANZapine  2.5 mg  Intramuscular Q4H PRN Max 3/day Basilio Brown MD      OLANZapine  5 mg Oral Q3H PRN Max 3/day Basilio Brown MD      Or    OLANZapine  5 mg Intramuscular Q3H PRN Max 3/day Basilio Brown MD      OLANZapine  2.5 mg Oral Q4H PRN Max 6/day Basilio Brown MD      polyethylene glycol  17 g Oral Daily PRN Basilio Brown MD      propranolol  10 mg Oral Q8H PRN Basilio Brown MD      senna-docusate sodium  1 tablet Oral Daily PRN Basilio Brown MD      sertraline  150 mg Oral Daily WALLY Gomez      traZODone  50 mg Oral HS PRN Basilio Brown MD

## 2024-09-02 NOTE — PLAN OF CARE
Problem: Ineffective Coping  Goal: Identifies ineffective coping skills  Outcome: Progressing  Goal: Identifies healthy coping skills  Outcome: Progressing  Goal: Demonstrates healthy coping skills  Outcome: Progressing  Goal: Participates in unit activities  Description: Interventions:  - Provide therapeutic environment   - Provide required programming   - Redirect inappropriate behaviors   Outcome: Progressing     Problem: Risk for Self Injury/Neglect  Goal: Treatment Goal: Remain safe during length of stay, learn and adopt new coping skills, and be free of self-injurious ideation, impulses and acts at the time of discharge  Outcome: Progressing     Problem: Depression  Goal: Treatment Goal: Demonstrate behavioral control of depressive symptoms, verbalize feelings of improved mood/affect, and adopt new coping skills prior to discharge  Outcome: Progressing  Goal: Verbalize thoughts and feelings  Description: Interventions:  - Assess and re-assess patient's level of risk   - Engage patient in 1:1 interactions, daily, for a minimum of 15 minutes   - Encourage patient to express feelings, fears, frustrations, hopes   Outcome: Progressing  Goal: Refrain from harming self  Description: Interventions:  - Monitor patient closely, per order   - Supervise medication ingestion, monitor effects and side effects   Outcome: Progressing  Goal: Refrain from isolation  Description: Interventions:  - Develop a trusting relationship   - Encourage socialization   Outcome: Progressing  Goal: Refrain from self-neglect  Outcome: Progressing  Goal: Attend and participate in unit activities, including therapeutic, recreational, and educational groups  Description: Interventions:  - Provide therapeutic and educational activities daily, encourage attendance and participation, and document same in the medical record   Outcome: Progressing  Goal: Complete daily ADLs, including personal hygiene independently, as able  Description:  Interventions:  - Observe, teach, and assist patient with ADLS  -  Monitor and promote a balance of rest/activity, with adequate nutrition and elimination   Outcome: Progressing     Problem: Anxiety  Goal: Anxiety is at manageable level  Description: Interventions:  - Assess and monitor patient's anxiety level.   - Monitor for signs and symptoms (heart palpitations, chest pain, shortness of breath, headaches, nausea, feeling jumpy, restlessness, irritable, apprehensive).   - Collaborate with interdisciplinary team and initiate plan and interventions as ordered.  - [place] patient to unit/surroundings  - Explain treatment plan  - Encourage participation in care  - Encourage verbalization of concerns/fears  - Identify coping mechanisms  - Assist in developing anxiety-reducing skills  - Administer/offer alternative therapies  - Limit or eliminate stimulants  Outcome: Progressing     Problem: DISCHARGE PLANNING - CARE MANAGEMENT  Goal: Discharge to post-acute care or home with appropriate resources  Description: INTERVENTIONS:  - Conduct assessment to determine patient/family and health care team treatment goals, and need for post-acute services based on payer coverage, community resources, and patient preferences, and barriers to discharge  - Address psychosocial, clinical, and financial barriers to discharge as identified in assessment in conjunction with the patient/family and health care team  - Arrange appropriate level of post-acute services according to patient’s   needs and preference and payer coverage in collaboration with the physician and health care team  - Communicate with and update the patient/family, physician, and health care team regarding progress on the discharge plan  - Arrange appropriate transportation to post-acute venues  Outcome: Progressing     Problem: Knowledge Deficit  Goal: Patient/family/caregiver demonstrates understanding of disease process, treatment plan, medications, and discharge  instructions  Description: Complete learning assessment and assess knowledge base.  Interventions:  - Provide teaching at level of understanding  - Provide teaching via preferred learning methods  Outcome: Progressing     Problem: SLEEP DISTURBANCE  Goal: Will exhibit normal sleeping pattern  Description: Interventions:  -  Assess the patients sleep pattern, noting recent changes  - Administer medication as ordered  - Decrease environmental stimuli, including noise, as appropriate during the night  - Encourage the patient to actively participate in unit groups and or exercise during the day to enhance ability to achieve adequate sleep at night  - Assess the patient, in the morning, encouraging a description of sleep experience  Outcome: Progressing

## 2024-09-03 PROCEDURE — 99232 SBSQ HOSP IP/OBS MODERATE 35: CPT | Performed by: STUDENT IN AN ORGANIZED HEALTH CARE EDUCATION/TRAINING PROGRAM

## 2024-09-03 RX ADMIN — FAMOTIDINE 20 MG: 20 TABLET ORAL at 08:07

## 2024-09-03 RX ADMIN — SERTRALINE HYDROCHLORIDE 150 MG: 100 TABLET ORAL at 08:07

## 2024-09-03 RX ADMIN — CHOLECALCIFEROL TAB 25 MCG (1000 UNIT) 2000 UNITS: 25 TAB at 08:07

## 2024-09-03 RX ADMIN — CYANOCOBALAMIN TAB 1000 MCG 1000 MCG: 1000 TAB at 08:07

## 2024-09-03 RX ADMIN — FAMOTIDINE 20 MG: 20 TABLET ORAL at 17:03

## 2024-09-03 RX ADMIN — LEVOTHYROXINE SODIUM 37.5 MCG: 125 TABLET ORAL at 06:30

## 2024-09-03 RX ADMIN — ATORVASTATIN CALCIUM 10 MG: 10 TABLET, FILM COATED ORAL at 17:03

## 2024-09-03 RX ADMIN — ARIPIPRAZOLE 5 MG: 5 TABLET ORAL at 08:06

## 2024-09-03 NOTE — PLAN OF CARE
Problem: Ineffective Coping  Goal: Identifies ineffective coping skills  Outcome: Progressing  Goal: Identifies healthy coping skills  Outcome: Progressing  Goal: Demonstrates healthy coping skills  Outcome: Progressing  Goal: Participates in unit activities  Description: Interventions:  - Provide therapeutic environment   - Provide required programming   - Redirect inappropriate behaviors   Outcome: Progressing     Problem: Risk for Self Injury/Neglect  Goal: Treatment Goal: Remain safe during length of stay, learn and adopt new coping skills, and be free of self-injurious ideation, impulses and acts at the time of discharge  Outcome: Progressing     Problem: Depression  Goal: Treatment Goal: Demonstrate behavioral control of depressive symptoms, verbalize feelings of improved mood/affect, and adopt new coping skills prior to discharge  Outcome: Progressing  Goal: Verbalize thoughts and feelings  Description: Interventions:  - Assess and re-assess patient's level of risk   - Engage patient in 1:1 interactions, daily, for a minimum of 15 minutes   - Encourage patient to express feelings, fears, frustrations, hopes   Outcome: Progressing  Goal: Refrain from harming self  Description: Interventions:  - Monitor patient closely, per order   - Supervise medication ingestion, monitor effects and side effects   Outcome: Progressing  Goal: Refrain from isolation  Description: Interventions:  - Develop a trusting relationship   - Encourage socialization   Outcome: Progressing  Goal: Refrain from self-neglect  Outcome: Progressing  Goal: Attend and participate in unit activities, including therapeutic, recreational, and educational groups  Description: Interventions:  - Provide therapeutic and educational activities daily, encourage attendance and participation, and document same in the medical record   Outcome: Progressing  Goal: Complete daily ADLs, including personal hygiene independently, as able  Description:  Interventions:  - Observe, teach, and assist patient with ADLS  -  Monitor and promote a balance of rest/activity, with adequate nutrition and elimination   Outcome: Progressing     Problem: Anxiety  Goal: Anxiety is at manageable level  Description: Interventions:  - Assess and monitor patient's anxiety level.   - Monitor for signs and symptoms (heart palpitations, chest pain, shortness of breath, headaches, nausea, feeling jumpy, restlessness, irritable, apprehensive).   - Collaborate with interdisciplinary team and initiate plan and interventions as ordered.  - [place] patient to unit/surroundings  - Explain treatment plan  - Encourage participation in care  - Encourage verbalization of concerns/fears  - Identify coping mechanisms  - Assist in developing anxiety-reducing skills  - Administer/offer alternative therapies  - Limit or eliminate stimulants  Outcome: Progressing     Problem: DISCHARGE PLANNING - CARE MANAGEMENT  Goal: Discharge to post-acute care or home with appropriate resources  Description: INTERVENTIONS:  - Conduct assessment to determine patient/family and health care team treatment goals, and need for post-acute services based on payer coverage, community resources, and patient preferences, and barriers to discharge  - Address psychosocial, clinical, and financial barriers to discharge as identified in assessment in conjunction with the patient/family and health care team  - Arrange appropriate level of post-acute services according to patient’s   needs and preference and payer coverage in collaboration with the physician and health care team  - Communicate with and update the patient/family, physician, and health care team regarding progress on the discharge plan  - Arrange appropriate transportation to post-acute venues  Outcome: Progressing     Problem: Knowledge Deficit  Goal: Patient/family/caregiver demonstrates understanding of disease process, treatment plan, medications, and discharge  instructions  Description: Complete learning assessment and assess knowledge base.  Interventions:  - Provide teaching at level of understanding  - Provide teaching via preferred learning methods  Outcome: Progressing     Problem: SLEEP DISTURBANCE  Goal: Will exhibit normal sleeping pattern  Description: Interventions:  -  Assess the patients sleep pattern, noting recent changes  - Administer medication as ordered  - Decrease environmental stimuli, including noise, as appropriate during the night  - Encourage the patient to actively participate in unit groups and or exercise during the day to enhance ability to achieve adequate sleep at night  - Assess the patient, in the morning, encouraging a description of sleep experience  Outcome: Progressing

## 2024-09-03 NOTE — PROGRESS NOTES
09/03/24 0847   Team Meeting   Meeting Type Daily Rounds   Team Members Present   Team Members Present Physician;Nurse;   Physician Team Member Alex   [facility]   Care Management Team Member Noa   Patient/Family Present   Patient Present No   Patient's Family Present No     Pt is medication and meal compliant. Pt remains in behavioral control. Pt denies SI/HI/AVH. Pt is pending discharge placement and Cm is working with the area on aging and disabilities with [facility].

## 2024-09-03 NOTE — PROGRESS NOTES
Progress Note - Behavioral Health   Franco Roberson 39 y.o. male MRN: [id number]  Unit/Bed#: [facility] 345-02 Encounter: [id number]    Assessment & Plan   Principal Problem:    MDD (major depressive disorder), recurrent severe, without psychosis (HCC)  Active Problems:    Unspecified depressive disorder (HCC)    Medical clearance for psychiatric admission    Hyperlipidemia    Vitamin D deficiency    B12 deficiency    Hypothyroidism    Rule Out Autism spectrum disorder    Treatment Plan:  -Continue Abilify 5mg Daily  -Continue Sertraline 150mg Daily    Behavior over the last 24 hours:  improved  Sleep: normal  Appetite: normal  Medication side effects: No  ROS: no complaints    Subjective: Patient states that his weekend was pretty good. Denies SI/HI/AVH. Asked if anything eventful happened. Patient denied it, stating that he generally keeps his head down. Patient stating that his medications are doing well.     Mental Status Evaluation:  Appearance:  age appropriate   Behavior:  Cooperative   Speech:  normal volume   Mood:  euthymic   Affect:  mood-congruent   Thought Process:  normal   Associations: intact associations   Thought Content:  normal   Perceptual Disturbances: None   Risk Potential: Suicidal Ideations none  Homicidal Ideations none  Potential for Aggression No   Sensorium:  person, place, time/date, and situation   Memory:  recent and remote memory grossly intact   Consciousness:  alert and awake    Attention: attention span and concentration were age appropriate   Insight:  limited   Judgment: limited   Gait/Station: normal gait/station   Motor Activity: no abnormal movements     Progress Toward Goals: Improved    Recommended Treatment: Continue with group therapy, milieu therapy and occupational therapy.      Risks, benefits and possible side effects of Medications:   Risks, benefits, and possible side effects of medications explained to patient and patient verbalizes understanding.      Medications: current  meds:   Current Facility-Administered Medications   Medication Dose Route Frequency    acetaminophen (TYLENOL) tablet 650 mg  650 mg Oral Q6H PRN    acetaminophen (TYLENOL) tablet 650 mg  650 mg Oral Q4H PRN    acetaminophen (TYLENOL) tablet 975 mg  975 mg Oral Q6H PRN    aluminum-magnesium hydroxide-simethicone (MAALOX) oral suspension 30 mL  30 mL Oral Q4H PRN    ARIPiprazole (ABILIFY) tablet 5 mg  5 mg Oral Daily    Artificial Tears ophthalmic solution 1 drop  1 drop Both Eyes Q3H PRN    atorvastatin (LIPITOR) tablet 10 mg  10 mg Oral Daily With Dinner    benztropine (COGENTIN) injection 1 mg  1 mg Intramuscular Q4H PRN Max 6/day    benztropine (COGENTIN) tablet 1 mg  1 mg Oral Q4H PRN Max 6/day    Cholecalciferol (VITAMIN D3) tablet 2,000 Units  2,000 Units Oral Daily    cyanocobalamin (VITAMIN B-12) tablet 1,000 mcg  1,000 mcg Oral Daily    Diclofenac Sodium (VOLTAREN) 1 % topical gel 2 g  2 g Topical 4x Daily PRN    hydrOXYzine HCL (ATARAX) tablet 50 mg  50 mg Oral Q6H PRN Max 4/day    Or    diphenhydrAMINE (BENADRYL) injection 50 mg  50 mg Intramuscular Q6H PRN    diphenhydrAMINE-zinc acetate (BENADRYL) 2-0.1 % cream   Topical Daily PRN    famotidine (PEPCID) tablet 20 mg  20 mg Oral BID    hydrocortisone 1 % cream   Topical 4x Daily PRN    hydrOXYzine HCL (ATARAX) tablet 100 mg  100 mg Oral Q6H PRN Max 4/day    Or    LORazepam (ATIVAN) injection 2 mg  2 mg Intramuscular Q6H PRN    hydrOXYzine HCL (ATARAX) tablet 25 mg  25 mg Oral Q6H PRN Max 4/day    levothyroxine tablet 37.5 mcg  37.5 mcg Oral Early Morning    melatonin tablet 6 mg  6 mg Oral HS PRN    methocarbamol (ROBAXIN) tablet 500 mg  500 mg Oral Q6H PRN    OLANZapine (ZyPREXA) tablet 5 mg  5 mg Oral Q4H PRN Max 3/day    Or    OLANZapine (ZyPREXA) IM injection 2.5 mg  2.5 mg Intramuscular Q4H PRN Max 3/day    OLANZapine (ZyPREXA) tablet 5 mg  5 mg Oral Q3H PRN Max 3/day    Or    OLANZapine (ZyPREXA) IM injection 5 mg  5 mg Intramuscular Q3H PRN Max  3/day    OLANZapine (ZyPREXA) tablet 2.5 mg  2.5 mg Oral Q4H PRN Max 6/day    polyethylene glycol (MIRALAX) packet 17 g  17 g Oral Daily PRN    propranolol (INDERAL) tablet 10 mg  10 mg Oral Q8H PRN    senna-docusate sodium (SENOKOT S) 8.6-50 mg per tablet 1 tablet  1 tablet Oral Daily PRN    sertraline (ZOLOFT) tablet 150 mg  150 mg Oral Daily    traZODone (DESYREL) tablet 50 mg  50 mg Oral HS PRN   .    Labs: I have personally reviewed all pertinent laboratory/tests results. Most Recent Labs:   Lab Results   Component Value Date    WBC 7.01 06/27/2024    RBC 4.54 06/27/2024    HGB 14.5 06/27/2024    HCT 44.4 06/27/2024     06/27/2024    RDW 12.0 06/27/2024    NEUTROABS 4.01 06/27/2024    SODIUM 139 07/10/2024    K 3.8 07/10/2024     07/10/2024    CO2 27 07/10/2024    BUN 16 07/10/2024    CREATININE 0.92 07/10/2024    GLUC 87 07/10/2024    GLUF 87 07/10/2024    CALCIUM 9.3 07/10/2024    AST 28 07/10/2024    ALT 36 07/10/2024    ALKPHOS 66 07/10/2024    TP 7.7 07/10/2024    ALB 4.4 07/10/2024    TBILI 0.55 07/10/2024    CHOLESTEROL 159 08/09/2024    HDL 54 08/09/2024    TRIG 88 08/09/2024    LDLCALC 87 08/09/2024    NONHDLC 105 08/09/2024    DWC9LESZPMWR 5.705 (H) 07/10/2024    FREET4 0.54 (L) 07/10/2024

## 2024-09-03 NOTE — NURSING NOTE
Patient is calm and cooperative on the unit. Denies SI, HI, SIB, auditory and visual hallucinations. Patient is med and meal compliant. Visible on the unit, withdrawn to self. Denies any unmet needs at this time. Maintaining continuous rounding for safety.

## 2024-09-04 PROCEDURE — 99232 SBSQ HOSP IP/OBS MODERATE 35: CPT | Performed by: STUDENT IN AN ORGANIZED HEALTH CARE EDUCATION/TRAINING PROGRAM

## 2024-09-04 RX ADMIN — LEVOTHYROXINE SODIUM 37.5 MCG: 125 TABLET ORAL at 05:58

## 2024-09-04 RX ADMIN — ARIPIPRAZOLE 5 MG: 5 TABLET ORAL at 08:10

## 2024-09-04 RX ADMIN — SERTRALINE HYDROCHLORIDE 150 MG: 100 TABLET ORAL at 08:10

## 2024-09-04 RX ADMIN — FAMOTIDINE 20 MG: 20 TABLET ORAL at 17:18

## 2024-09-04 RX ADMIN — CYANOCOBALAMIN TAB 1000 MCG 1000 MCG: 1000 TAB at 08:10

## 2024-09-04 RX ADMIN — FAMOTIDINE 20 MG: 20 TABLET ORAL at 08:10

## 2024-09-04 RX ADMIN — ATORVASTATIN CALCIUM 10 MG: 10 TABLET, FILM COATED ORAL at 17:18

## 2024-09-04 RX ADMIN — CHOLECALCIFEROL TAB 25 MCG (1000 UNIT) 2000 UNITS: 25 TAB at 08:10

## 2024-09-04 NOTE — PLAN OF CARE
Problem: Ineffective Coping  Goal: Identifies ineffective coping skills  Outcome: Progressing  Goal: Identifies healthy coping skills  Outcome: Progressing  Goal: Demonstrates healthy coping skills  Outcome: Progressing  Goal: Participates in unit activities  Description: Interventions:  - Provide therapeutic environment   - Provide required programming   - Redirect inappropriate behaviors   Outcome: Progressing     Problem: Risk for Self Injury/Neglect  Goal: Treatment Goal: Remain safe during length of stay, learn and adopt new coping skills, and be free of self-injurious ideation, impulses and acts at the time of discharge  Outcome: Progressing     Problem: Depression  Goal: Treatment Goal: Demonstrate behavioral control of depressive symptoms, verbalize feelings of improved mood/affect, and adopt new coping skills prior to discharge  Outcome: Progressing  Goal: Verbalize thoughts and feelings  Description: Interventions:  - Assess and re-assess patient's level of risk   - Engage patient in 1:1 interactions, daily, for a minimum of 15 minutes   - Encourage patient to express feelings, fears, frustrations, hopes   Outcome: Progressing  Goal: Refrain from harming self  Description: Interventions:  - Monitor patient closely, per order   - Supervise medication ingestion, monitor effects and side effects   Outcome: Progressing  Goal: Refrain from isolation  Description: Interventions:  - Develop a trusting relationship   - Encourage socialization   Outcome: Progressing  Goal: Refrain from self-neglect  Outcome: Progressing  Goal: Attend and participate in unit activities, including therapeutic, recreational, and educational groups  Description: Interventions:  - Provide therapeutic and educational activities daily, encourage attendance and participation, and document same in the medical record   Outcome: Progressing  Goal: Complete daily ADLs, including personal hygiene independently, as able  Description:  Interventions:  - Observe, teach, and assist patient with ADLS  -  Monitor and promote a balance of rest/activity, with adequate nutrition and elimination   Outcome: Progressing     Problem: Anxiety  Goal: Anxiety is at manageable level  Description: Interventions:  - Assess and monitor patient's anxiety level.   - Monitor for signs and symptoms (heart palpitations, chest pain, shortness of breath, headaches, nausea, feeling jumpy, restlessness, irritable, apprehensive).   - Collaborate with interdisciplinary team and initiate plan and interventions as ordered.  - [place] patient to unit/surroundings  - Explain treatment plan  - Encourage participation in care  - Encourage verbalization of concerns/fears  - Identify coping mechanisms  - Assist in developing anxiety-reducing skills  - Administer/offer alternative therapies  - Limit or eliminate stimulants  Outcome: Progressing     Problem: DISCHARGE PLANNING - CARE MANAGEMENT  Goal: Discharge to post-acute care or home with appropriate resources  Description: INTERVENTIONS:  - Conduct assessment to determine patient/family and health care team treatment goals, and need for post-acute services based on payer coverage, community resources, and patient preferences, and barriers to discharge  - Address psychosocial, clinical, and financial barriers to discharge as identified in assessment in conjunction with the patient/family and health care team  - Arrange appropriate level of post-acute services according to patient’s   needs and preference and payer coverage in collaboration with the physician and health care team  - Communicate with and update the patient/family, physician, and health care team regarding progress on the discharge plan  - Arrange appropriate transportation to post-acute venues  Outcome: Progressing

## 2024-09-04 NOTE — SOCIAL WORK
Cm called the [facility] on [facility] to follow up on referral to services. Cm explained Pt's situation and the recommendation for a group home.     [facility] called Cm back. Cm discussed several different resource possibilities for Pt.     [facility]- [phone]    [facility]   [phone]

## 2024-09-04 NOTE — PROGRESS NOTES
Progress Note - Behavioral Health   Franco Roberson 39 y.o. male MRN: [id number]  Unit/Bed#: [facility] 345-02 Encounter: [id number]    Assessment & Plan   Principal Problem:    MDD (major depressive disorder), recurrent severe, without psychosis (HCC)  Active Problems:    Unspecified depressive disorder (HCC)    Medical clearance for psychiatric admission    Hyperlipidemia    Vitamin D deficiency    B12 deficiency    Hypothyroidism    Rule Out Autism spectrum disorder    Treatment Plan:  -Continue Abilify 5mg Daily  -Continue Sertraline 150mg Daily    Behavior over the last 24 hours:  improved  Sleep: normal  Appetite: normal  Medication side effects: No  ROS: no complaints    Subjective: Patient interviewed in his room. Patient endorsed nothing significant occurred overnight. Denied SI/HI/AVH. Still endorsed going to groups. Tolerating medications well.     Mental Status Evaluation:  Appearance:  age appropriate   Behavior:  Coopertaive   Speech:  normal volume   Mood:  euthymic   Affect:  mood-congruent   Thought Process:  normal   Associations: intact associations   Thought Content:  normal   Perceptual Disturbances: None   Risk Potential: Suicidal Ideations none  Homicidal Ideations none  Potential for Aggression No   Sensorium:  person, place, time/date, and situation   Memory:  recent and remote memory grossly intact   Consciousness:  alert and awake    Attention: attention span and concentration were age appropriate   Insight:  limited   Judgment: limited   Gait/Station: normal gait/station   Motor Activity: no abnormal movements     Progress Toward Goals: Improved    Recommended Treatment: Continue with group therapy, milieu therapy and occupational therapy.      Risks, benefits and possible side effects of Medications:   Risks, benefits, and possible side effects of medications explained to patient and patient verbalizes understanding.      Medications: current meds:   Current Facility-Administered Medications    Medication Dose Route Frequency    acetaminophen (TYLENOL) tablet 650 mg  650 mg Oral Q6H PRN    acetaminophen (TYLENOL) tablet 650 mg  650 mg Oral Q4H PRN    acetaminophen (TYLENOL) tablet 975 mg  975 mg Oral Q6H PRN    aluminum-magnesium hydroxide-simethicone (MAALOX) oral suspension 30 mL  30 mL Oral Q4H PRN    ARIPiprazole (ABILIFY) tablet 5 mg  5 mg Oral Daily    Artificial Tears ophthalmic solution 1 drop  1 drop Both Eyes Q3H PRN    atorvastatin (LIPITOR) tablet 10 mg  10 mg Oral Daily With Dinner    benztropine (COGENTIN) injection 1 mg  1 mg Intramuscular Q4H PRN Max 6/day    benztropine (COGENTIN) tablet 1 mg  1 mg Oral Q4H PRN Max 6/day    Cholecalciferol (VITAMIN D3) tablet 2,000 Units  2,000 Units Oral Daily    cyanocobalamin (VITAMIN B-12) tablet 1,000 mcg  1,000 mcg Oral Daily    Diclofenac Sodium (VOLTAREN) 1 % topical gel 2 g  2 g Topical 4x Daily PRN    hydrOXYzine HCL (ATARAX) tablet 50 mg  50 mg Oral Q6H PRN Max 4/day    Or    diphenhydrAMINE (BENADRYL) injection 50 mg  50 mg Intramuscular Q6H PRN    diphenhydrAMINE-zinc acetate (BENADRYL) 2-0.1 % cream   Topical Daily PRN    famotidine (PEPCID) tablet 20 mg  20 mg Oral BID    hydrocortisone 1 % cream   Topical 4x Daily PRN    hydrOXYzine HCL (ATARAX) tablet 100 mg  100 mg Oral Q6H PRN Max 4/day    Or    LORazepam (ATIVAN) injection 2 mg  2 mg Intramuscular Q6H PRN    hydrOXYzine HCL (ATARAX) tablet 25 mg  25 mg Oral Q6H PRN Max 4/day    levothyroxine tablet 37.5 mcg  37.5 mcg Oral Early Morning    melatonin tablet 6 mg  6 mg Oral HS PRN    methocarbamol (ROBAXIN) tablet 500 mg  500 mg Oral Q6H PRN    OLANZapine (ZyPREXA) tablet 5 mg  5 mg Oral Q4H PRN Max 3/day    Or    OLANZapine (ZyPREXA) IM injection 2.5 mg  2.5 mg Intramuscular Q4H PRN Max 3/day    OLANZapine (ZyPREXA) tablet 5 mg  5 mg Oral Q3H PRN Max 3/day    Or    OLANZapine (ZyPREXA) IM injection 5 mg  5 mg Intramuscular Q3H PRN Max 3/day    OLANZapine (ZyPREXA) tablet 2.5 mg  2.5 mg  Oral Q4H PRN Max 6/day    polyethylene glycol (MIRALAX) packet 17 g  17 g Oral Daily PRN    propranolol (INDERAL) tablet 10 mg  10 mg Oral Q8H PRN    senna-docusate sodium (SENOKOT S) 8.6-50 mg per tablet 1 tablet  1 tablet Oral Daily PRN    sertraline (ZOLOFT) tablet 150 mg  150 mg Oral Daily    traZODone (DESYREL) tablet 50 mg  50 mg Oral HS PRN   .    Labs: I have personally reviewed all pertinent laboratory/tests results. Most Recent Labs:   Lab Results   Component Value Date    WBC 7.01 06/27/2024    RBC 4.54 06/27/2024    HGB 14.5 06/27/2024    HCT 44.4 06/27/2024     06/27/2024    RDW 12.0 06/27/2024    NEUTROABS 4.01 06/27/2024    SODIUM 139 07/10/2024    K 3.8 07/10/2024     07/10/2024    CO2 27 07/10/2024    BUN 16 07/10/2024    CREATININE 0.92 07/10/2024    GLUC 87 07/10/2024    GLUF 87 07/10/2024    CALCIUM 9.3 07/10/2024    AST 28 07/10/2024    ALT 36 07/10/2024    ALKPHOS 66 07/10/2024    TP 7.7 07/10/2024    ALB 4.4 07/10/2024    TBILI 0.55 07/10/2024    CHOLESTEROL 159 08/09/2024    HDL 54 08/09/2024    TRIG 88 08/09/2024    LDLCALC 87 08/09/2024    NONHDLC 105 08/09/2024    [id number] 5.705 (H) 07/10/2024    FREET4 0.54 (L) 07/10/2024

## 2024-09-04 NOTE — NURSING NOTE
Pt visible throughout beginning of evening, quiet, calm, depressed affect. Pt denies any unmet needs or issues. Denies SI/HI/AH/VH.

## 2024-09-04 NOTE — NURSING NOTE
Patient is calm and cooperative upon approach. Patient is visible walking unit. Patient denies SI/HI/AH/VH. Patient is compliant with meds and meals.

## 2024-09-05 PROCEDURE — 99232 SBSQ HOSP IP/OBS MODERATE 35: CPT | Performed by: STUDENT IN AN ORGANIZED HEALTH CARE EDUCATION/TRAINING PROGRAM

## 2024-09-05 RX ADMIN — ATORVASTATIN CALCIUM 10 MG: 10 TABLET, FILM COATED ORAL at 17:11

## 2024-09-05 RX ADMIN — CHOLECALCIFEROL TAB 25 MCG (1000 UNIT) 2000 UNITS: 25 TAB at 08:25

## 2024-09-05 RX ADMIN — CYANOCOBALAMIN TAB 1000 MCG 1000 MCG: 1000 TAB at 08:25

## 2024-09-05 RX ADMIN — LEVOTHYROXINE SODIUM 37.5 MCG: 125 TABLET ORAL at 06:16

## 2024-09-05 RX ADMIN — FAMOTIDINE 20 MG: 20 TABLET ORAL at 17:11

## 2024-09-05 RX ADMIN — ARIPIPRAZOLE 5 MG: 5 TABLET ORAL at 08:25

## 2024-09-05 RX ADMIN — FAMOTIDINE 20 MG: 20 TABLET ORAL at 08:25

## 2024-09-05 RX ADMIN — SERTRALINE HYDROCHLORIDE 150 MG: 100 TABLET ORAL at 08:25

## 2024-09-05 NOTE — PLAN OF CARE
Problem: Ineffective Coping  Goal: Demonstrates healthy coping skills  Outcome: Progressing  Goal: Participates in unit activities  Description: Interventions:  - Provide therapeutic environment   - Provide required programming   - Redirect inappropriate behaviors   Outcome: Progressing     Problem: Depression  Goal: Verbalize thoughts and feelings  Description: Interventions:  - Assess and re-assess patient's level of risk   - Engage patient in 1:1 interactions, daily, for a minimum of 15 minutes   - Encourage patient to express feelings, fears, frustrations, hopes   Outcome: Progressing  Goal: Refrain from harming self  Description: Interventions:  - Monitor patient closely, per order   - Supervise medication ingestion, monitor effects and side effects   Outcome: Progressing  Goal: Refrain from isolation  Description: Interventions:  - Develop a trusting relationship   - Encourage socialization   Outcome: Progressing

## 2024-09-05 NOTE — NURSING NOTE
Patient is calm and cooperative upon approach. Patient visible walking on unit. Patient denies SI/HI/AH/VH. Patient is compliant with meds and meals.

## 2024-09-05 NOTE — PROGRESS NOTES
Progress Note - Behavioral Health   Franco Roberson 39 y.o. male MRN: [id number]  Unit/Bed#: [facility] 345-02 Encounter: [id number]    Assessment & Plan   Principal Problem:    MDD (major depressive disorder), recurrent severe, without psychosis (HCC)  Active Problems:    Unspecified depressive disorder (HCC)    Medical clearance for psychiatric admission    Hyperlipidemia    Vitamin D deficiency    B12 deficiency    Hypothyroidism    Rule Out Autism spectrum disorder    Treatment Plan:  -Continue Abilify 5mg Daily  -Continue Sertraline 150mg Daily    Behavior over the last 24 hours:  improved  Sleep: normal  Appetite: normal  Medication side effects: No  ROS: no complaints    Subjective: Patient endorsed doing okay. Stated nothing significant happened yesterday. Attempted small talk about lunch. Patient endorsed planning to eat a hamburger today. Asked further about his experiences with hamburgers. Patient denied any significant opinions about hamburgers. Remained fairly simple with answers. Patient tolerating medications well. Denied SI/HI/AVH.    Mental Status Evaluation:  Appearance:  age appropriate   Behavior:  Cooperative   Speech:  articulation error   Mood:  normal   Affect:  normal   Thought Process:  normal   Associations: intact associations   Thought Content:  normal   Perceptual Disturbances: None   Risk Potential: Suicidal Ideations none  Homicidal Ideations none  Potential for Aggression No   Sensorium:  person, place, time/date, and situation   Memory:  recent and remote memory grossly intact   Consciousness:  alert and awake    Attention: attention span and concentration were age appropriate   Insight:  limited   Judgment: limited   Gait/Station: normal gait/station   Motor Activity: no abnormal movements     Progress Toward Goals: Improved    Recommended Treatment: Continue with group therapy, milieu therapy and occupational therapy.      Risks, benefits and possible side effects of Medications:   Risks,  benefits, and possible side effects of medications explained to patient and patient verbalizes understanding.      Medications: current meds:   Current Facility-Administered Medications   Medication Dose Route Frequency    acetaminophen (TYLENOL) tablet 650 mg  650 mg Oral Q6H PRN    acetaminophen (TYLENOL) tablet 650 mg  650 mg Oral Q4H PRN    acetaminophen (TYLENOL) tablet 975 mg  975 mg Oral Q6H PRN    aluminum-magnesium hydroxide-simethicone (MAALOX) oral suspension 30 mL  30 mL Oral Q4H PRN    ARIPiprazole (ABILIFY) tablet 5 mg  5 mg Oral Daily    Artificial Tears ophthalmic solution 1 drop  1 drop Both Eyes Q3H PRN    atorvastatin (LIPITOR) tablet 10 mg  10 mg Oral Daily With Dinner    benztropine (COGENTIN) injection 1 mg  1 mg Intramuscular Q4H PRN Max 6/day    benztropine (COGENTIN) tablet 1 mg  1 mg Oral Q4H PRN Max 6/day    Cholecalciferol (VITAMIN D3) tablet 2,000 Units  2,000 Units Oral Daily    cyanocobalamin (VITAMIN B-12) tablet 1,000 mcg  1,000 mcg Oral Daily    Diclofenac Sodium (VOLTAREN) 1 % topical gel 2 g  2 g Topical 4x Daily PRN    hydrOXYzine HCL (ATARAX) tablet 50 mg  50 mg Oral Q6H PRN Max 4/day    Or    diphenhydrAMINE (BENADRYL) injection 50 mg  50 mg Intramuscular Q6H PRN    diphenhydrAMINE-zinc acetate (BENADRYL) 2-0.1 % cream   Topical Daily PRN    famotidine (PEPCID) tablet 20 mg  20 mg Oral BID    hydrocortisone 1 % cream   Topical 4x Daily PRN    hydrOXYzine HCL (ATARAX) tablet 100 mg  100 mg Oral Q6H PRN Max 4/day    Or    LORazepam (ATIVAN) injection 2 mg  2 mg Intramuscular Q6H PRN    hydrOXYzine HCL (ATARAX) tablet 25 mg  25 mg Oral Q6H PRN Max 4/day    levothyroxine tablet 37.5 mcg  37.5 mcg Oral Early Morning    melatonin tablet 6 mg  6 mg Oral HS PRN    methocarbamol (ROBAXIN) tablet 500 mg  500 mg Oral Q6H PRN    OLANZapine (ZyPREXA) tablet 5 mg  5 mg Oral Q4H PRN Max 3/day    Or    OLANZapine (ZyPREXA) IM injection 2.5 mg  2.5 mg Intramuscular Q4H PRN Max 3/day     OLANZapine (ZyPREXA) tablet 5 mg  5 mg Oral Q3H PRN Max 3/day    Or    OLANZapine (ZyPREXA) IM injection 5 mg  5 mg Intramuscular Q3H PRN Max 3/day    OLANZapine (ZyPREXA) tablet 2.5 mg  2.5 mg Oral Q4H PRN Max 6/day    polyethylene glycol (MIRALAX) packet 17 g  17 g Oral Daily PRN    propranolol (INDERAL) tablet 10 mg  10 mg Oral Q8H PRN    senna-docusate sodium (SENOKOT S) 8.6-50 mg per tablet 1 tablet  1 tablet Oral Daily PRN    sertraline (ZOLOFT) tablet 150 mg  150 mg Oral Daily    traZODone (DESYREL) tablet 50 mg  50 mg Oral HS PRN   .    Labs: I have personally reviewed all pertinent laboratory/tests results. Most Recent Labs:   Lab Results   Component Value Date    WBC 7.01 06/27/2024    RBC 4.54 06/27/2024    HGB 14.5 06/27/2024    HCT 44.4 06/27/2024     06/27/2024    RDW 12.0 06/27/2024    NEUTROABS 4.01 06/27/2024    SODIUM 139 07/10/2024    K 3.8 07/10/2024     07/10/2024    CO2 27 07/10/2024    BUN 16 07/10/2024    CREATININE 0.92 07/10/2024    GLUC 87 07/10/2024    GLUF 87 07/10/2024    CALCIUM 9.3 07/10/2024    AST 28 07/10/2024    ALT 36 07/10/2024    ALKPHOS 66 07/10/2024    TP 7.7 07/10/2024    ALB 4.4 07/10/2024    TBILI 0.55 07/10/2024    CHOLESTEROL 159 08/09/2024    HDL 54 08/09/2024    TRIG 88 08/09/2024    LDLCALC 87 08/09/2024    NONHDLC 105 08/09/2024    [id number] 5.705 (H) 07/10/2024    FREET4 0.54 (L) 07/10/2024

## 2024-09-05 NOTE — NURSING NOTE
Pt is calm and cooperative upon approach. Pt denies SI/HI/AH/VH at this time, appears depressed. Pt is visible walking throughout unit. Pt is medication and meal compliant. Maintaining continuous rounding for safety.

## 2024-09-05 NOTE — PROGRESS NOTES
09/05/24 0824   Team Meeting   Meeting Type Daily Rounds   Team Members Present   Team Members Present Physician;Nurse;   Physician Team Member Alex   Nursing Team Member [id number] Management Team Member Noa   Patient/Family Present   Patient Present No   Patient's Family Present No     Pt is medication and meal compliant. Pt denies SI/HI/AVH. Pt's discharge is pending placement.

## 2024-09-06 PROCEDURE — 99232 SBSQ HOSP IP/OBS MODERATE 35: CPT | Performed by: STUDENT IN AN ORGANIZED HEALTH CARE EDUCATION/TRAINING PROGRAM

## 2024-09-06 RX ADMIN — LEVOTHYROXINE SODIUM 37.5 MCG: 125 TABLET ORAL at 06:23

## 2024-09-06 RX ADMIN — ARIPIPRAZOLE 5 MG: 5 TABLET ORAL at 09:04

## 2024-09-06 RX ADMIN — CHOLECALCIFEROL TAB 25 MCG (1000 UNIT) 2000 UNITS: 25 TAB at 09:04

## 2024-09-06 RX ADMIN — SERTRALINE HYDROCHLORIDE 150 MG: 100 TABLET ORAL at 09:04

## 2024-09-06 RX ADMIN — CYANOCOBALAMIN TAB 1000 MCG 1000 MCG: 1000 TAB at 09:04

## 2024-09-06 RX ADMIN — FAMOTIDINE 20 MG: 20 TABLET ORAL at 09:04

## 2024-09-06 RX ADMIN — FAMOTIDINE 20 MG: 20 TABLET ORAL at 17:03

## 2024-09-06 RX ADMIN — ATORVASTATIN CALCIUM 10 MG: 10 TABLET, FILM COATED ORAL at 17:03

## 2024-09-06 NOTE — PROGRESS NOTES
"Progress Note - Behavioral Health   Franco Roberson 39 y.o. male MRN: [id number]  Unit/Bed#: [facility] 345-02 Encounter: [id number]    Assessment & Plan   Principal Problem:    MDD (major depressive disorder), recurrent severe, without psychosis (HCC)  Active Problems:    Unspecified depressive disorder (HCC)    Medical clearance for psychiatric admission    Hyperlipidemia    Vitamin D deficiency    B12 deficiency    Hypothyroidism    Rule Out Autism spectrum disorder    Treatment Plan:  -Continue Abilify 5mg Daily  -Continue Sertraline 150mg Daily    Behavior over the last 24 hours:  improved  Sleep: normal  Appetite: normal  Medication side effects: No  ROS: no complaints    Subjective: Patient endorsed doing okay. When asked about what he has planned for the weekend. Patient replied \"hang out, relax and stuff.\" States he is doing well. Discussed continued plan to await placement.     Mental Status Evaluation:  Appearance:  age appropriate   Behavior:  normal   Speech:  normal volume   Mood:  normal   Affect:  mood-congruent   Thought Process:  normal   Associations: intact associations   Thought Content:  normal   Perceptual Disturbances: None   Risk Potential: Suicidal Ideations none  Homicidal Ideations none  Potential for Aggression No   Sensorium:  person, place, time/date, and situation   Memory:  recent and remote memory grossly intact   Consciousness:  alert and awake    Attention: attention span and concentration were age appropriate   Insight:  limited   Judgment: limited   Gait/Station: normal gait/station   Motor Activity: no abnormal movements     Progress Toward Goals: Improved    Recommended Treatment: Continue with group therapy, milieu therapy and occupational therapy.      Risks, benefits and possible side effects of Medications:   Risks, benefits, and possible side effects of medications explained to patient and patient verbalizes understanding.      Medications: current meds:   Current " Facility-Administered Medications   Medication Dose Route Frequency    acetaminophen (TYLENOL) tablet 650 mg  650 mg Oral Q6H PRN    acetaminophen (TYLENOL) tablet 650 mg  650 mg Oral Q4H PRN    acetaminophen (TYLENOL) tablet 975 mg  975 mg Oral Q6H PRN    aluminum-magnesium hydroxide-simethicone (MAALOX) oral suspension 30 mL  30 mL Oral Q4H PRN    ARIPiprazole (ABILIFY) tablet 5 mg  5 mg Oral Daily    Artificial Tears ophthalmic solution 1 drop  1 drop Both Eyes Q3H PRN    atorvastatin (LIPITOR) tablet 10 mg  10 mg Oral Daily With Dinner    benztropine (COGENTIN) injection 1 mg  1 mg Intramuscular Q4H PRN Max 6/day    benztropine (COGENTIN) tablet 1 mg  1 mg Oral Q4H PRN Max 6/day    Cholecalciferol (VITAMIN D3) tablet 2,000 Units  2,000 Units Oral Daily    cyanocobalamin (VITAMIN B-12) tablet 1,000 mcg  1,000 mcg Oral Daily    Diclofenac Sodium (VOLTAREN) 1 % topical gel 2 g  2 g Topical 4x Daily PRN    hydrOXYzine HCL (ATARAX) tablet 50 mg  50 mg Oral Q6H PRN Max 4/day    Or    diphenhydrAMINE (BENADRYL) injection 50 mg  50 mg Intramuscular Q6H PRN    diphenhydrAMINE-zinc acetate (BENADRYL) 2-0.1 % cream   Topical Daily PRN    famotidine (PEPCID) tablet 20 mg  20 mg Oral BID    hydrOXYzine HCL (ATARAX) tablet 100 mg  100 mg Oral Q6H PRN Max 4/day    Or    LORazepam (ATIVAN) injection 2 mg  2 mg Intramuscular Q6H PRN    hydrOXYzine HCL (ATARAX) tablet 25 mg  25 mg Oral Q6H PRN Max 4/day    levothyroxine tablet 37.5 mcg  37.5 mcg Oral Early Morning    melatonin tablet 6 mg  6 mg Oral HS PRN    methocarbamol (ROBAXIN) tablet 500 mg  500 mg Oral Q6H PRN    OLANZapine (ZyPREXA) tablet 5 mg  5 mg Oral Q4H PRN Max 3/day    Or    OLANZapine (ZyPREXA) IM injection 2.5 mg  2.5 mg Intramuscular Q4H PRN Max 3/day    OLANZapine (ZyPREXA) tablet 5 mg  5 mg Oral Q3H PRN Max 3/day    Or    OLANZapine (ZyPREXA) IM injection 5 mg  5 mg Intramuscular Q3H PRN Max 3/day    OLANZapine (ZyPREXA) tablet 2.5 mg  2.5 mg Oral Q4H PRN Max  6/day    polyethylene glycol (MIRALAX) packet 17 g  17 g Oral Daily PRN    propranolol (INDERAL) tablet 10 mg  10 mg Oral Q8H PRN    senna-docusate sodium (SENOKOT S) 8.6-50 mg per tablet 1 tablet  1 tablet Oral Daily PRN    sertraline (ZOLOFT) tablet 150 mg  150 mg Oral Daily    traZODone (DESYREL) tablet 50 mg  50 mg Oral HS PRN   .    Labs: I have personally reviewed all pertinent laboratory/tests results. Most Recent Labs:   Lab Results   Component Value Date    WBC 7.01 06/27/2024    RBC 4.54 06/27/2024    HGB 14.5 06/27/2024    HCT 44.4 06/27/2024     06/27/2024    RDW 12.0 06/27/2024    NEUTROABS 4.01 06/27/2024    SODIUM 139 07/10/2024    K 3.8 07/10/2024     07/10/2024    CO2 27 07/10/2024    BUN 16 07/10/2024    CREATININE 0.92 07/10/2024    GLUC 87 07/10/2024    GLUF 87 07/10/2024    CALCIUM 9.3 07/10/2024    AST 28 07/10/2024    ALT 36 07/10/2024    ALKPHOS 66 07/10/2024    TP 7.7 07/10/2024    ALB 4.4 07/10/2024    TBILI 0.55 07/10/2024    CHOLESTEROL 159 08/09/2024    HDL 54 08/09/2024    TRIG 88 08/09/2024    LDLCALC 87 08/09/2024    NONHDLC 105 08/09/2024    [id number] 5.705 (H) 07/10/2024    FREET4 0.54 (L) 07/10/2024

## 2024-09-06 NOTE — PROGRESS NOTES
09/06/24 0845   Team Meeting   Meeting Type Daily Rounds   Team Members Present   Team Members Present Physician;;Nurse   Physician Team Member Alex   Nursing Team Member [facility] Management Team Member Noa   Patient/Family Present   Patient Present No   Patient's Family Present No     Pt is visible on the unit. Pt is medication and meal compliant. Pt denies SI/HI/AVH. Pt's discharge is pending for placement.

## 2024-09-06 NOTE — NURSING NOTE
Patient is visible, walking the halls. He is pleasant and calm. Patient denies all signs and symptoms.  Patient attended group and was appropriate.

## 2024-09-06 NOTE — PLAN OF CARE
Problem: Depression  Goal: Refrain from harming self  Description: Interventions:  - Monitor patient closely, per order   - Supervise medication ingestion, monitor effects and side effects   Outcome: Progressing  Goal: Refrain from isolation  Description: Interventions:  - Develop a trusting relationship   - Encourage socialization   Outcome: Progressing  Goal: Refrain from self-neglect  Outcome: Progressing  Goal: Complete daily ADLs, including personal hygiene independently, as able  Description: Interventions:  - Observe, teach, and assist patient with ADLS  -  Monitor and promote a balance of rest/activity, with adequate nutrition and elimination   Outcome: Progressing     Problem: Anxiety  Goal: Anxiety is at manageable level  Description: Interventions:  - Assess and monitor patient's anxiety level.   - Monitor for signs and symptoms (heart palpitations, chest pain, shortness of breath, headaches, nausea, feeling jumpy, restlessness, irritable, apprehensive).   - Collaborate with interdisciplinary team and initiate plan and interventions as ordered.  - [place] patient to unit/surroundings  - Explain treatment plan  - Encourage participation in care  - Encourage verbalization of concerns/fears  - Identify coping mechanisms  - Assist in developing anxiety-reducing skills  - Administer/offer alternative therapies  - Limit or eliminate stimulants  Outcome: Progressing

## 2024-09-06 NOTE — NURSING NOTE
Patient is visible on the unit. He withdrew to his room early for sleep. Denies all psych symptoms, is calm and does report any unmet needs at this time. Compliant with all nightly medications. Continual rounding maintained for safety.      Plan for admission for NG clean out

## 2024-09-07 PROCEDURE — 99232 SBSQ HOSP IP/OBS MODERATE 35: CPT | Performed by: PSYCHIATRY & NEUROLOGY

## 2024-09-07 RX ADMIN — ATORVASTATIN CALCIUM 10 MG: 10 TABLET, FILM COATED ORAL at 17:25

## 2024-09-07 RX ADMIN — FAMOTIDINE 20 MG: 20 TABLET ORAL at 08:54

## 2024-09-07 RX ADMIN — ARIPIPRAZOLE 5 MG: 5 TABLET ORAL at 08:54

## 2024-09-07 RX ADMIN — SERTRALINE HYDROCHLORIDE 150 MG: 100 TABLET ORAL at 08:54

## 2024-09-07 RX ADMIN — CHOLECALCIFEROL TAB 25 MCG (1000 UNIT) 2000 UNITS: 25 TAB at 08:54

## 2024-09-07 RX ADMIN — LEVOTHYROXINE SODIUM 37.5 MCG: 125 TABLET ORAL at 06:28

## 2024-09-07 RX ADMIN — CYANOCOBALAMIN TAB 1000 MCG 1000 MCG: 1000 TAB at 08:54

## 2024-09-07 RX ADMIN — FAMOTIDINE 20 MG: 20 TABLET ORAL at 17:24

## 2024-09-07 NOTE — PLAN OF CARE
Problem: Ineffective Coping  Goal: Identifies ineffective coping skills  Outcome: Progressing  Goal: Identifies healthy coping skills  Outcome: Progressing  Goal: Demonstrates healthy coping skills  Outcome: Progressing     Problem: Risk for Self Injury/Neglect  Goal: Treatment Goal: Remain safe during length of stay, learn and adopt new coping skills, and be free of self-injurious ideation, impulses and acts at the time of discharge  Outcome: Progressing     Problem: Depression  Goal: Treatment Goal: Demonstrate behavioral control of depressive symptoms, verbalize feelings of improved mood/affect, and adopt new coping skills prior to discharge  Outcome: Progressing  Goal: Verbalize thoughts and feelings  Description: Interventions:  - Assess and re-assess patient's level of risk   - Engage patient in 1:1 interactions, daily, for a minimum of 15 minutes   - Encourage patient to express feelings, fears, frustrations, hopes   Outcome: Progressing  Goal: Refrain from harming self  Description: Interventions:  - Monitor patient closely, per order   - Supervise medication ingestion, monitor effects and side effects   Outcome: Progressing  Goal: Refrain from isolation  Description: Interventions:  - Develop a trusting relationship   - Encourage socialization   Outcome: Progressing  Goal: Refrain from self-neglect  Outcome: Progressing  Goal: Attend and participate in unit activities, including therapeutic, recreational, and educational groups  Description: Interventions:  - Provide therapeutic and educational activities daily, encourage attendance and participation, and document same in the medical record   Outcome: Progressing  Goal: Complete daily ADLs, including personal hygiene independently, as able  Description: Interventions:  - Observe, teach, and assist patient with ADLS  -  Monitor and promote a balance of rest/activity, with adequate nutrition and elimination   Outcome: Progressing     Problem: Anxiety  Goal:  Anxiety is at manageable level  Description: Interventions:  - Assess and monitor patient's anxiety level.   - Monitor for signs and symptoms (heart palpitations, chest pain, shortness of breath, headaches, nausea, feeling jumpy, restlessness, irritable, apprehensive).   - Collaborate with interdisciplinary team and initiate plan and interventions as ordered.  - [place] patient to unit/surroundings  - Explain treatment plan  - Encourage participation in care  - Encourage verbalization of concerns/fears  - Identify coping mechanisms  - Assist in developing anxiety-reducing skills  - Administer/offer alternative therapies  - Limit or eliminate stimulants  Outcome: Progressing     Problem: DISCHARGE PLANNING - CARE MANAGEMENT  Goal: Discharge to post-acute care or home with appropriate resources  Description: INTERVENTIONS:  - Conduct assessment to determine patient/family and health care team treatment goals, and need for post-acute services based on payer coverage, community resources, and patient preferences, and barriers to discharge  - Address psychosocial, clinical, and financial barriers to discharge as identified in assessment in conjunction with the patient/family and health care team  - Arrange appropriate level of post-acute services according to patient’s   needs and preference and payer coverage in collaboration with the physician and health care team  - Communicate with and update the patient/family, physician, and health care team regarding progress on the discharge plan  - Arrange appropriate transportation to post-acute venues  Outcome: Progressing     Problem: Knowledge Deficit  Goal: Patient/family/caregiver demonstrates understanding of disease process, treatment plan, medications, and discharge instructions  Description: Complete learning assessment and assess knowledge base.  Interventions:  - Provide teaching at level of understanding  - Provide teaching via preferred learning methods  Outcome:  Progressing     Problem: SLEEP DISTURBANCE  Goal: Will exhibit normal sleeping pattern  Description: Interventions:  -  Assess the patients sleep pattern, noting recent changes  - Administer medication as ordered  - Decrease environmental stimuli, including noise, as appropriate during the night  - Encourage the patient to actively participate in unit groups and or exercise during the day to enhance ability to achieve adequate sleep at night  - Assess the patient, in the morning, encouraging a description of sleep experience  Outcome: Progressing

## 2024-09-07 NOTE — NURSING NOTE
Patient has been visible on the unit and attended group.  He is med/meal compliant and denies all signs and symptoms.  Patient is waiting for placement.

## 2024-09-07 NOTE — PROGRESS NOTES
"Progress Note - Behavioral Health   Franco Roberson 39 y.o. male MRN: [id number]  Unit/Bed#: U 345-02 Encounter: [id number]    Assessment   Principal Problem:    MDD (major depressive disorder), recurrent severe, without psychosis (HCC)  Active Problems:    Unspecified depressive disorder (HCC)    Medical clearance for psychiatric admission    Hyperlipidemia    Vitamin D deficiency    B12 deficiency    Hypothyroidism    Rule Out Autism spectrum disorder      Plan    No psychopharmacologic changes made at this time.   Continue current psychiatric medications.  Continue to assess for adverse medication side effects.  Continue medication management per [facility] ([facility]) as indicated.  Continue to encourage group therapy, milieu therapy, individual therapy, and occupational therapy.  Continue to engage CM/SW to assist with collateral, disposition planning, and the implementation of an individualized, patient-centered plan of care.  Continue frequent safety checks and vitals per unit protocol.      Risks, benefits and possible side effects of Medications:   Risks, benefits, and possible side effects of medications have previously been explained. No new medications at this time.    Legal status: 201 voluntary commitment  Disposition: Awaiting placement    ------------------------------------------------------------------------------------------------------  Subjective:  Per nursing report: No overt behavioral issues.  Med and meal compliant. PRNs in last 24 hours: None    Franco was evaluated this morning in unit milieu for continuity of care. Patient was calm, pleasant, and cooperative with interview. Patient reports mood today as \" pretty good.\"  Patient denies depressive symptoms and anxiety today.  At the time of today's evaluation, the patient denies AVH, active SI, passive SI, paranoia, thoughts to self harm, and HI.       Progress Toward Goals: Progressing    Psychiatric Review of Systems:   Behavior " "over the last 24 hours:  unchanged-patient remains in behavioral control  Sleep: Normal  Appetite:  normal  Medication side effects:No   ROS:no complaints.   Last bowel movement: Per record review, last bowel movement was 9/7/2024      Vital Signs in last 24 hours:  Reviewed, within normal limits  Temp:  [96.6 °F (35.9 °C)-97.3 °F (36.3 °C)] 96.6 °F (35.9 °C)  HR:  [71-80] 71  Resp:  [16] 16  BP: (114-121)/(73-76) 121/73    Laboratory Results:    I have personally reviewed all pertinent laboratory/tests results  Most Recent Labs:   Lab Results   Component Value Date    WBC 7.01 06/27/2024    RBC 4.54 06/27/2024    HGB 14.5 06/27/2024    HCT 44.4 06/27/2024     06/27/2024    RDW 12.0 06/27/2024    NEUTROABS 4.01 06/27/2024    SODIUM 139 07/10/2024    K 3.8 07/10/2024     07/10/2024    CO2 27 07/10/2024    BUN 16 07/10/2024    CREATININE 0.92 07/10/2024    GLUC 87 07/10/2024    CALCIUM 9.3 07/10/2024    AST 28 07/10/2024    ALT 36 07/10/2024    ALKPHOS 66 07/10/2024    TP 7.7 07/10/2024    ALB 4.4 07/10/2024    TBILI 0.55 07/10/2024    CHOLESTEROL 159 08/09/2024    HDL 54 08/09/2024    TRIG 88 08/09/2024    LDLCALC 87 08/09/2024    NONHDLC 105 08/09/2024    XOY1BJESOPBY 5.705 (H) 07/10/2024    FREET4 0.54 (L) 07/10/2024    SYPHILISAB Non-reactive 05/15/2024       Mental Status Evaluation:  Appearance:  Male, alert, oily hair, wearing casual top and hospital pants, appears stated age   Behavior:   calm, pleasant, cooperative, tyrone and almost childlike at times, limited eye contact   Speech:  Soft, normal rate, clear, coherent   Mood:  \"Pretty good\"   Affect:  Constricted with moments of slight brightness   Thought Process:  Goal directed, linear   Thought Content:  No overt delusions   Perceptual Disturbances: Denies AVH, does not appear to be responding to internal stimuli   Risk Potential: Suicidal ideation -denies active/passive SI  Homicidal ideation -denies  Potential for aggression -no   Sensorium:  " oriented to person and situation   Memory:  recent and remote memory grossly intact   Consciousness:  alert and awake   Attention/Concentration: attention span and concentration are age appropriate   Insight:  fair   Judgment: fair   Gait/Station: normal gait/station   Motor Activity: no abnormal movements       Behavioral Health Medications: All current active meds have been reviewed.     Current Medications: all current active meds have been reviewed.  Current Facility-Administered Medications   Medication Dose Route Frequency Provider Last Rate    acetaminophen  650 mg Oral Q6H PRN Basilio Brown MD      acetaminophen  650 mg Oral Q4H PRN Basilio Brown MD      acetaminophen  975 mg Oral Q6H PRN Basilio Brown MD      aluminum-magnesium hydroxide-simethicone  30 mL Oral Q4H PRN Basilio Brown MD      ARIPiprazole  5 mg Oral Daily Basilio Panda MD      Artificial Tears  1 drop Both Eyes Q3H PRN Basilio Brown MD      atorvastatin  10 mg Oral Daily With Dinner WALLY Baptiste      benztropine  1 mg Intramuscular Q4H PRN Max 6/day Basilio Brown MD      benztropine  1 mg Oral Q4H PRN Max 6/day Basilio Brown MD      Cholecalciferol  2,000 Units Oral Daily WALLY Baptiste      cyanocobalamin  1,000 mcg Oral Daily WALLY Baptiste      Diclofenac Sodium  2 g Topical 4x Daily PRN WALLY Baptiste      hydrOXYzine HCL  50 mg Oral Q6H PRN Max 4/day Basilio Brown MD      Or    diphenhydrAMINE  50 mg Intramuscular Q6H PRN Basilio Brown MD      diphenhydrAMINE-zinc acetate   Topical Daily PRN Raj Guerrero MD      famotidine  20 mg Oral BID WALLY Baptiste      hydrOXYzine HCL  100 mg Oral Q6H PRN Max 4/day Basilio Brown MD      Or    LORazepam  2 mg Intramuscular Q6H PRN Basilio Brown MD      hydrOXYzine HCL  25 mg Oral Q6H PRN Max 4/day Basilio Brown MD      levothyroxine  37.5 mcg Oral  Early Morning WALLY Baptiste      melatonin  6 mg Oral HS PRN WALLY Gomez      methocarbamol  500 mg Oral Q6H PRN Laura WALLY Olmos      OLANZapine  5 mg Oral Q4H PRN Max 3/day Basilio Brown MD      Or    OLANZapine  2.5 mg Intramuscular Q4H PRN Max 3/day Basilio Brown MD      OLANZapine  5 mg Oral Q3H PRN Max 3/day Basilio Brown MD      Or    OLANZapine  5 mg Intramuscular Q3H PRN Max 3/day Basilio Brown MD      OLANZapine  2.5 mg Oral Q4H PRN Max 6/day Basilio Brown MD      polyethylene glycol  17 g Oral Daily PRN Basilio Brown MD      propranolol  10 mg Oral Q8H PRN Basilio Brown MD      senna-docusate sodium  1 tablet Oral Daily PRN Basilio Brown MD      sertraline  150 mg Oral Daily WALLY Gomez      traZODone  50 mg Oral HS PRN Basilio Brown MD           Counseling / Coordination of Care:  Patient's progress discussed with staff in daily treatment team meeting.    Litzy James DO 09/07/24  Psychiatry Resident, PGY- II  [facility]  Department of Psychiatry and Behavioral Health      This note was not shared with the patient due to reasonable likelihood of causing patient harm.    This note has been constructed using a voice recognition system (Dragon). As, a result, there may be translation, syntax, or grammatical errors.  Grammatical, translation, syntax errors, random word insertions, spelling mistakes, and incomplete sentences may be an occasional consequence of this system secondary to software limitations with voice recognition, ambient noise, and hardware issues. If you have any questions or concerns about the content, text, or information contained within the body of this dictation, please contact the provider for clarification.

## 2024-09-07 NOTE — NURSING NOTE
Patient has been in the milieu for needs and taking some exercise in the hallway. He is pleasant and appropriate in interaction with peers and staff. He denies S.I.H.I.A/H V/H

## 2024-09-08 PROCEDURE — 99232 SBSQ HOSP IP/OBS MODERATE 35: CPT | Performed by: PSYCHIATRY & NEUROLOGY

## 2024-09-08 RX ADMIN — CYANOCOBALAMIN TAB 1000 MCG 1000 MCG: 1000 TAB at 08:56

## 2024-09-08 RX ADMIN — FAMOTIDINE 20 MG: 20 TABLET ORAL at 08:56

## 2024-09-08 RX ADMIN — FAMOTIDINE 20 MG: 20 TABLET ORAL at 17:00

## 2024-09-08 RX ADMIN — ARIPIPRAZOLE 5 MG: 5 TABLET ORAL at 08:56

## 2024-09-08 RX ADMIN — SERTRALINE HYDROCHLORIDE 150 MG: 100 TABLET ORAL at 08:56

## 2024-09-08 RX ADMIN — ATORVASTATIN CALCIUM 10 MG: 10 TABLET, FILM COATED ORAL at 16:56

## 2024-09-08 RX ADMIN — LEVOTHYROXINE SODIUM 37.5 MCG: 125 TABLET ORAL at 06:27

## 2024-09-08 RX ADMIN — CHOLECALCIFEROL TAB 25 MCG (1000 UNIT) 2000 UNITS: 25 TAB at 08:56

## 2024-09-08 NOTE — PLAN OF CARE
Problem: Ineffective Coping  Goal: Identifies ineffective coping skills  Outcome: Progressing  Goal: Identifies healthy coping skills  Outcome: Progressing  Goal: Demonstrates healthy coping skills  Outcome: Progressing     Problem: Risk for Self Injury/Neglect  Goal: Treatment Goal: Remain safe during length of stay, learn and adopt new coping skills, and be free of self-injurious ideation, impulses and acts at the time of discharge  Outcome: Progressing     Problem: Depression  Goal: Treatment Goal: Demonstrate behavioral control of depressive symptoms, verbalize feelings of improved mood/affect, and adopt new coping skills prior to discharge  Outcome: Progressing  Goal: Refrain from harming self  Description: Interventions:  - Monitor patient closely, per order   - Supervise medication ingestion, monitor effects and side effects   Outcome: Progressing  Goal: Attend and participate in unit activities, including therapeutic, recreational, and educational groups  Description: Interventions:  - Provide therapeutic and educational activities daily, encourage attendance and participation, and document same in the medical record   Outcome: Progressing     Problem: Anxiety  Goal: Anxiety is at manageable level  Description: Interventions:  - Assess and monitor patient's anxiety level.   - Monitor for signs and symptoms (heart palpitations, chest pain, shortness of breath, headaches, nausea, feeling jumpy, restlessness, irritable, apprehensive).   - Collaborate with interdisciplinary team and initiate plan and interventions as ordered.  - [place] patient to unit/surroundings  - Explain treatment plan  - Encourage participation in care  - Encourage verbalization of concerns/fears  - Identify coping mechanisms  - Assist in developing anxiety-reducing skills  - Administer/offer alternative therapies  - Limit or eliminate stimulants  Outcome: Progressing     Problem: DISCHARGE PLANNING - CARE MANAGEMENT  Goal: Discharge to  post-acute care or home with appropriate resources  Description: INTERVENTIONS:  - Conduct assessment to determine patient/family and health care team treatment goals, and need for post-acute services based on payer coverage, community resources, and patient preferences, and barriers to discharge  - Address psychosocial, clinical, and financial barriers to discharge as identified in assessment in conjunction with the patient/family and health care team  - Arrange appropriate level of post-acute services according to patient’s   needs and preference and payer coverage in collaboration with the physician and health care team  - Communicate with and update the patient/family, physician, and health care team regarding progress on the discharge plan  - Arrange appropriate transportation to post-acute venues  Outcome: Progressing     Problem: Knowledge Deficit  Goal: Patient/family/caregiver demonstrates understanding of disease process, treatment plan, medications, and discharge instructions  Description: Complete learning assessment and assess knowledge base.  Interventions:  - Provide teaching at level of understanding  - Provide teaching via preferred learning methods  Outcome: Progressing     Problem: SLEEP DISTURBANCE  Goal: Will exhibit normal sleeping pattern  Description: Interventions:  -  Assess the patients sleep pattern, noting recent changes  - Administer medication as ordered  - Decrease environmental stimuli, including noise, as appropriate during the night  - Encourage the patient to actively participate in unit groups and or exercise during the day to enhance ability to achieve adequate sleep at night  - Assess the patient, in the morning, encouraging a description of sleep experience  Outcome: Progressing     Problem: Depression  Goal: Verbalize thoughts and feelings  Description: Interventions:  - Assess and re-assess patient's level of risk   - Engage patient in 1:1 interactions, daily, for a minimum  of 15 minutes   - Encourage patient to express feelings, fears, frustrations, hopes   Outcome: Not Progressing  Goal: Refrain from isolation  Description: Interventions:  - Develop a trusting relationship   - Encourage socialization   Outcome: Not Progressing  Goal: Refrain from self-neglect  Outcome: Not Progressing  Goal: Complete daily ADLs, including personal hygiene independently, as able  Description: Interventions:  - Observe, teach, and assist patient with ADLS  -  Monitor and promote a balance of rest/activity, with adequate nutrition and elimination   Outcome: Not Progressing

## 2024-09-08 NOTE — NURSING NOTE
Patient has been in the milieu sparingly this evening but is pleasant and appropriate with patients and staff. He denies S.I.H.I.A/H V/H

## 2024-09-08 NOTE — NURSING NOTE
"Patient is visible on the unit, med/meal compliant.  He reports his mood is \"good\" and he ate and slept.  He denies SI/HI and A/V hallucinations  "

## 2024-09-08 NOTE — PROGRESS NOTES
"Progress Note - Behavioral Health   Franco Roberson 39 y.o. male MRN: [id number]  Unit/Bed#: U 345-02 Encounter: [id number]    Assessment   Principal Problem:    MDD (major depressive disorder), recurrent severe, without psychosis (HCC)  Active Problems:    Unspecified depressive disorder (HCC)    Medical clearance for psychiatric admission    Hyperlipidemia    Vitamin D deficiency    B12 deficiency    Hypothyroidism    Rule Out Autism spectrum disorder      Plan    No psychopharmacologic changes made at this time.   Continue current psychiatric medications.  Continue to assess for adverse medication side effects.  Continue medication management per [facility] ([facility]) as indicated.  Continue to encourage group therapy, milieu therapy, individual therapy, and occupational therapy.  Continue to engage CM/SW to assist with collateral, disposition planning, and the implementation of an individualized, patient-centered plan of care.  Continue frequent safety checks and vitals per unit protocol.      Risks, benefits and possible side effects of Medications:   Risks, benefits, and possible side effects of medications have previously been explained. No new medications at this time.    Legal status: 201 voluntary commitment  Disposition: Awaiting placement    ------------------------------------------------------------------------------------------------------  Subjective:  Per nursing report: No overt behavioral issues.  Visible in unit yesterday and attended groups.  Med and meal compliant. PRNs in last 24 hours: None    Franco was evaluated this morning in his room for continuity of care. Patient was cooperative but guarded with interview. Patient reports mood today as \" could be worse.\"  Patient denies anxiety today.  Patient reports \"a little\" depression today.  He commonly states that it is getting \"harder and harder to wake up here in the hospital\".  However, patient explains that he intends to continue " "to wait patiently for placement.  At the time of today's evaluation, the patient denies AVH, active SI, passive SI, thoughts to self harm, and HI.  Patient denies any unmet needs.  Patient endorses feeling safe on the unit.      Progress Toward Goals: progressing    Psychiatric Review of Systems:   Behavior over the last 24 hours:  unchanged  Sleep: normal  Appetite:  normal  Medication side effects:No   ROS:no complaints.   Last bowel movement: 9/7/2024      Vital Signs in last 24 hours:  Reviewed, within normal limits  Temp:  [97.1 °F (36.2 °C)-97.2 °F (36.2 °C)] 97.2 °F (36.2 °C)  HR:  [68-74] 74  Resp:  [16] 16  BP: (117-124)/(64-69) 124/69    Laboratory Results:    I have personally reviewed all pertinent laboratory/tests results  Most Recent Labs:   Lab Results   Component Value Date    WBC 7.01 06/27/2024    RBC 4.54 06/27/2024    HGB 14.5 06/27/2024    HCT 44.4 06/27/2024     06/27/2024    RDW 12.0 06/27/2024    NEUTROABS 4.01 06/27/2024    SODIUM 139 07/10/2024    K 3.8 07/10/2024     07/10/2024    CO2 27 07/10/2024    BUN 16 07/10/2024    CREATININE 0.92 07/10/2024    GLUC 87 07/10/2024    CALCIUM 9.3 07/10/2024    AST 28 07/10/2024    ALT 36 07/10/2024    ALKPHOS 66 07/10/2024    TP 7.7 07/10/2024    ALB 4.4 07/10/2024    TBILI 0.55 07/10/2024    CHOLESTEROL 159 08/09/2024    HDL 54 08/09/2024    TRIG 88 08/09/2024    LDLCALC 87 08/09/2024    NONHDLC 105 08/09/2024    [id number] 5.705 (H) 07/10/2024    FREET4 0.54 (L) 07/10/2024    SYPHILISAB Non-reactive 05/15/2024       Mental Status Evaluation:  Appearance:  Male, alert, wearing same gray polo as yesterday, hair remains oily, appears stated age   Behavior:  Cooperative, pleasant, guarded, lying in bed, limited eye contact   Speech:  Soft, stutter present at times   Mood:  \"Could be worse\"   Affect:  Constricted   Thought Process:  Goal directed, linear   Thought Content:  No overt delusions   Perceptual Disturbances: Denies AVH, does not " appear to be responding to internal stimuli   Risk Potential: Suicidal ideation -denies active/passive SI  Homicidal ideation -denies HI  Potential for aggression - no   Sensorium:  oriented to person and situation   Memory:  recent and remote memory grossly intact   Consciousness:  alert and awake   Attention/Concentration: attention span and concentration are age appropriate   Insight:  fair   Judgment: fair   Gait/Station: in bed   Motor Activity: no abnormal movements       Behavioral Health Medications: All current active meds have been reviewed    Current Medications: all current active meds have been reviewed.  Current Facility-Administered Medications   Medication Dose Route Frequency Provider Last Rate    acetaminophen  650 mg Oral Q6H PRN Basilio Brown MD      acetaminophen  650 mg Oral Q4H PRN Basilio Brown MD      acetaminophen  975 mg Oral Q6H PRN Basilio Brown MD      aluminum-magnesium hydroxide-simethicone  30 mL Oral Q4H PRN Basilio Brown MD      ARIPiprazole  5 mg Oral Daily Basilio Panda MD      Artificial Tears  1 drop Both Eyes Q3H PRN Basilio Brown MD      atorvastatin  10 mg Oral Daily With Dinner WALLY Baptiste      benztropine  1 mg Intramuscular Q4H PRN Max 6/day Basilio Brown MD      benztropine  1 mg Oral Q4H PRN Max 6/day Basilio Brown MD      Cholecalciferol  2,000 Units Oral Daily WALLY Baptiste      cyanocobalamin  1,000 mcg Oral Daily WALLY Baptiste      Diclofenac Sodium  2 g Topical 4x Daily PRN WALLY Baptiste      hydrOXYzine HCL  50 mg Oral Q6H PRN Max 4/day Basilio Brown MD      Or    diphenhydrAMINE  50 mg Intramuscular Q6H PRN Basilio Brown MD      diphenhydrAMINE-zinc acetate   Topical Daily PRN Raj Guerrero MD      famotidine  20 mg Oral BID WALLY Baptiste      hydrOXYzine HCL  100 mg Oral Q6H PRN Max 4/day Basilio Brown MD      Or     LORazepam  2 mg Intramuscular Q6H PRN Basilio Brown MD      hydrOXYzine HCL  25 mg Oral Q6H PRN Max 4/day Basilio Brown MD      levothyroxine  37.5 mcg Oral Early Morning WALLY Baptiste      melatonin  6 mg Oral HS PRN WALLY Gomez      methocarbamol  500 mg Oral Q6H PRN WALLY Baptiste      OLANZapine  5 mg Oral Q4H PRN Max 3/day Basilio Brown MD      Or    OLANZapine  2.5 mg Intramuscular Q4H PRN Max 3/day Basilio Brown MD      OLANZapine  5 mg Oral Q3H PRN Max 3/day Basilio Brown MD      Or    OLANZapine  5 mg Intramuscular Q3H PRN Max 3/day Basilio Brown MD      OLANZapine  2.5 mg Oral Q4H PRN Max 6/day Basilio Brown MD      polyethylene glycol  17 g Oral Daily PRN Basilio Brown MD      propranolol  10 mg Oral Q8H PRN Basilio Brown MD      senna-docusate sodium  1 tablet Oral Daily PRN Basilio Brown MD      sertraline  150 mg Oral Daily WALLY Gomez      traZODone  50 mg Oral HS PRN Basilio Brown MD           Counseling / Coordination of Care:  Patient's progress discussed with staff in daily treatment team meeting.    Litzy James DO 09/08/24  Psychiatry Resident, PGY- II  [facility]  Department of Psychiatry and Behavioral Health      This note was not shared with the patient due to reasonable likelihood of causing patient harm.    This note has been constructed using a voice recognition system (Dragon). As, a result, there may be translation, syntax, or grammatical errors.  Grammatical, translation, syntax errors, random word insertions, spelling mistakes, and incomplete sentences may be an occasional consequence of this system secondary to software limitations with voice recognition, ambient noise, and hardware issues. If you have any questions or concerns about the content, text, or information contained within the body of this dictation, please contact the  provider for clarification.

## 2024-09-09 PROCEDURE — 99232 SBSQ HOSP IP/OBS MODERATE 35: CPT | Performed by: STUDENT IN AN ORGANIZED HEALTH CARE EDUCATION/TRAINING PROGRAM

## 2024-09-09 RX ADMIN — FAMOTIDINE 20 MG: 20 TABLET ORAL at 17:05

## 2024-09-09 RX ADMIN — CHOLECALCIFEROL TAB 25 MCG (1000 UNIT) 2000 UNITS: 25 TAB at 08:18

## 2024-09-09 RX ADMIN — FAMOTIDINE 20 MG: 20 TABLET ORAL at 08:18

## 2024-09-09 RX ADMIN — CYANOCOBALAMIN TAB 1000 MCG 1000 MCG: 1000 TAB at 08:18

## 2024-09-09 RX ADMIN — LEVOTHYROXINE SODIUM 37.5 MCG: 125 TABLET ORAL at 05:46

## 2024-09-09 RX ADMIN — ARIPIPRAZOLE 5 MG: 5 TABLET ORAL at 08:18

## 2024-09-09 RX ADMIN — SERTRALINE HYDROCHLORIDE 150 MG: 100 TABLET ORAL at 08:18

## 2024-09-09 RX ADMIN — ATORVASTATIN CALCIUM 10 MG: 10 TABLET, FILM COATED ORAL at 17:05

## 2024-09-09 NOTE — NURSING NOTE
Patient isolative to self and room. Patient is calm and pleasant upon approach. Patient denies SI/Hi/AH/VH. Patient is compliant with meds and meals.

## 2024-09-09 NOTE — PROGRESS NOTES
09/09/24 0867   Team Meeting   Meeting Type Daily Rounds   Team Members Present   Team Members Present Physician;Nurse;   Physician Team Member Alex   Nursing Team Member [facility] Management Team Member Noa   Patient/Family Present   Patient Present No   Patient's Family Present No     Pt denies SI/HI/AVH. Pt is medication and meal compliant. Pt is social with staff and selective peers. Pt's discharge is pending group home.

## 2024-09-09 NOTE — PLAN OF CARE
Pt did not attend groups over the weekend, but was present in some groups during the week last week. Pt participates appropriately when in attendance.

## 2024-09-09 NOTE — PROGRESS NOTES
Progress Note - Behavioral Health   Franco Roberson 39 y.o. male MRN: [id number]  Unit/Bed#: [facility] 345-02 Encounter: [id number]    Assessment & Plan   Principal Problem:    MDD (major depressive disorder), recurrent severe, without psychosis (HCC)  Active Problems:    Unspecified depressive disorder (HCC)    Medical clearance for psychiatric admission    Hyperlipidemia    Vitamin D deficiency    B12 deficiency    Hypothyroidism    Rule Out Autism spectrum disorder    Treatment Plan:  -Continue Abilify 5mg Daily  -Continue Sertraline 150mg Daily    Behavior over the last 24 hours:  improved  Sleep: normal  Appetite: normal  Medication side effects: No  ROS: no complaints    Subjective: Patient endorsed feeling well. States nothing special happened over the weekend. Endorsed watching football and enjoying it. Endorsed tolerating the medications well. Patient still waiting for placement.     Mental Status Evaluation:  Appearance:  age appropriate   Behavior:  Cooperative   Speech:  normal volume   Mood:  euthymic   Affect:  mood-congruent   Thought Process:  goal directed and logical   Associations: intact associations   Thought Content:  normal   Perceptual Disturbances: None   Risk Potential: Suicidal Ideations none  Homicidal Ideations none  Potential for Aggression No   Sensorium:  person, place, time/date, and situation   Memory:  recent and remote memory grossly intact   Consciousness:  alert and awake    Attention: attention span and concentration were age appropriate   Insight:  limited   Judgment: limited   Gait/Station: normal gait/station   Motor Activity: no abnormal movements     Progress Toward Goals: Improved    Recommended Treatment: Continue with group therapy, milieu therapy and occupational therapy.      Risks, benefits and possible side effects of Medications:   Risks, benefits, and possible side effects of medications explained to patient and patient verbalizes understanding.      Medications: current  meds:   Current Facility-Administered Medications   Medication Dose Route Frequency    acetaminophen (TYLENOL) tablet 650 mg  650 mg Oral Q6H PRN    acetaminophen (TYLENOL) tablet 650 mg  650 mg Oral Q4H PRN    acetaminophen (TYLENOL) tablet 975 mg  975 mg Oral Q6H PRN    aluminum-magnesium hydroxide-simethicone (MAALOX) oral suspension 30 mL  30 mL Oral Q4H PRN    ARIPiprazole (ABILIFY) tablet 5 mg  5 mg Oral Daily    Artificial Tears ophthalmic solution 1 drop  1 drop Both Eyes Q3H PRN    atorvastatin (LIPITOR) tablet 10 mg  10 mg Oral Daily With Dinner    benztropine (COGENTIN) injection 1 mg  1 mg Intramuscular Q4H PRN Max 6/day    benztropine (COGENTIN) tablet 1 mg  1 mg Oral Q4H PRN Max 6/day    Cholecalciferol (VITAMIN D3) tablet 2,000 Units  2,000 Units Oral Daily    cyanocobalamin (VITAMIN B-12) tablet 1,000 mcg  1,000 mcg Oral Daily    Diclofenac Sodium (VOLTAREN) 1 % topical gel 2 g  2 g Topical 4x Daily PRN    hydrOXYzine HCL (ATARAX) tablet 50 mg  50 mg Oral Q6H PRN Max 4/day    Or    diphenhydrAMINE (BENADRYL) injection 50 mg  50 mg Intramuscular Q6H PRN    diphenhydrAMINE-zinc acetate (BENADRYL) 2-0.1 % cream   Topical Daily PRN    famotidine (PEPCID) tablet 20 mg  20 mg Oral BID    hydrOXYzine HCL (ATARAX) tablet 100 mg  100 mg Oral Q6H PRN Max 4/day    Or    LORazepam (ATIVAN) injection 2 mg  2 mg Intramuscular Q6H PRN    hydrOXYzine HCL (ATARAX) tablet 25 mg  25 mg Oral Q6H PRN Max 4/day    levothyroxine tablet 37.5 mcg  37.5 mcg Oral Early Morning    melatonin tablet 6 mg  6 mg Oral HS PRN    methocarbamol (ROBAXIN) tablet 500 mg  500 mg Oral Q6H PRN    OLANZapine (ZyPREXA) tablet 5 mg  5 mg Oral Q4H PRN Max 3/day    Or    OLANZapine (ZyPREXA) IM injection 2.5 mg  2.5 mg Intramuscular Q4H PRN Max 3/day    OLANZapine (ZyPREXA) tablet 5 mg  5 mg Oral Q3H PRN Max 3/day    Or    OLANZapine (ZyPREXA) IM injection 5 mg  5 mg Intramuscular Q3H PRN Max 3/day    OLANZapine (ZyPREXA) tablet 2.5 mg  2.5 mg  Oral Q4H PRN Max 6/day    polyethylene glycol (MIRALAX) packet 17 g  17 g Oral Daily PRN    propranolol (INDERAL) tablet 10 mg  10 mg Oral Q8H PRN    senna-docusate sodium (SENOKOT S) 8.6-50 mg per tablet 1 tablet  1 tablet Oral Daily PRN    sertraline (ZOLOFT) tablet 150 mg  150 mg Oral Daily    traZODone (DESYREL) tablet 50 mg  50 mg Oral HS PRN   .    Labs: I have personally reviewed all pertinent laboratory/tests results. Most Recent Labs:   Lab Results   Component Value Date    WBC 7.01 06/27/2024    RBC 4.54 06/27/2024    HGB 14.5 06/27/2024    HCT 44.4 06/27/2024     06/27/2024    RDW 12.0 06/27/2024    NEUTROABS 4.01 06/27/2024    SODIUM 139 07/10/2024    K 3.8 07/10/2024     07/10/2024    CO2 27 07/10/2024    BUN 16 07/10/2024    CREATININE 0.92 07/10/2024    GLUC 87 07/10/2024    GLUF 87 07/10/2024    CALCIUM 9.3 07/10/2024    AST 28 07/10/2024    ALT 36 07/10/2024    ALKPHOS 66 07/10/2024    TP 7.7 07/10/2024    ALB 4.4 07/10/2024    TBILI 0.55 07/10/2024    CHOLESTEROL 159 08/09/2024    HDL 54 08/09/2024    TRIG 88 08/09/2024    LDLCALC 87 08/09/2024    NONHDLC 105 08/09/2024    [id number] 5.705 (H) 07/10/2024    FREET4 0.54 (L) 07/10/2024

## 2024-09-09 NOTE — NURSING NOTE
Patient was in the milieu in the earlier evening watching football however he went off to his room after HS snack. As usual he is very quiet and undemanding. He brightens when staff approach him. He denies S.I.H.I.A/H V/H

## 2024-09-09 NOTE — NURSING NOTE
About the unit occasionally and doing laps in the hallway alone. Pleasant, calm and cooperative. Denies symptoms. Compliant with medications. Hopeful to get some news on placement this week.

## 2024-09-10 LAB
T4 FREE SERPL-MCNC: 0.56 NG/DL (ref 0.61–1.12)
TSH SERPL DL<=0.05 MIU/L-ACNC: 6.24 UIU/ML (ref 0.45–4.5)

## 2024-09-10 PROCEDURE — 84439 ASSAY OF FREE THYROXINE: CPT | Performed by: NURSE PRACTITIONER

## 2024-09-10 PROCEDURE — 84443 ASSAY THYROID STIM HORMONE: CPT | Performed by: NURSE PRACTITIONER

## 2024-09-10 PROCEDURE — 99232 SBSQ HOSP IP/OBS MODERATE 35: CPT | Performed by: STUDENT IN AN ORGANIZED HEALTH CARE EDUCATION/TRAINING PROGRAM

## 2024-09-10 RX ADMIN — FAMOTIDINE 20 MG: 20 TABLET ORAL at 08:13

## 2024-09-10 RX ADMIN — CYANOCOBALAMIN TAB 1000 MCG 1000 MCG: 1000 TAB at 08:13

## 2024-09-10 RX ADMIN — LEVOTHYROXINE SODIUM 37.5 MCG: 125 TABLET ORAL at 06:23

## 2024-09-10 RX ADMIN — CHOLECALCIFEROL TAB 25 MCG (1000 UNIT) 2000 UNITS: 25 TAB at 08:13

## 2024-09-10 RX ADMIN — SERTRALINE HYDROCHLORIDE 150 MG: 100 TABLET ORAL at 08:13

## 2024-09-10 RX ADMIN — ARIPIPRAZOLE 5 MG: 5 TABLET ORAL at 08:13

## 2024-09-10 RX ADMIN — FAMOTIDINE 20 MG: 20 TABLET ORAL at 17:17

## 2024-09-10 RX ADMIN — ATORVASTATIN CALCIUM 10 MG: 10 TABLET, FILM COATED ORAL at 17:17

## 2024-09-10 NOTE — PROGRESS NOTES
Progress Note - Behavioral Health   Name: Franco Roberson 39 y.o. male I MRN: [id number]  Unit/Bed#: -02 I Date of Admission: 5/14/2024   Date of Service: 9/10/2024 I Hospital Day: 119     Assessment & Plan  MDD (major depressive disorder), recurrent severe, without psychosis (HCC)    Unspecified depressive disorder (HCC)    Medical clearance for psychiatric admission    Hyperlipidemia    Vitamin D deficiency    B12 deficiency    Hypothyroidism    Rule Out Autism spectrum disorder      Progress Toward Goals: Patient awaiting placement    Recommended Treatment: Continue with group therapy, milieu therapy and occupational therapy.    -Continue Abilify 5mg Daily  -Continue Sertraline 150mg Daily      Risks, benefits and possible side effects of Medications:   Risks, benefits, and possible side effects of medications explained to patient and patient verbalizes understanding.      History of Present Illness   Behavior over the last 24 hours:  improved  Sleep: insomnia  Appetite: normal  Medication side effects: No  ROS: no complaints    Subjective:Patient endorsed staying up at night in order to drink water to ensure that his blood draw is successful as he has been a difficult blood draw in the past. Patient endorsed being tired as a result. Denied any other complaints. Endorsed needing to catch up on sleep due to being up all night.     Objective   Mental Status Evaluation:  Appearance:  age appropriate   Behavior:  Cooperative   Speech:  normal volume   Mood:  euthymic   Affect:  mood-congruent   Thought Process:  goal directed and logical   Associations: intact associations   Thought Content:  normal   Perceptual Disturbances: None   Risk Potential: Suicidal Ideations none  Homicidal Ideations none  Potential for Aggression No   Sensorium:  person, place, time/date, and situation   Memory:  recent and remote memory grossly intact   Consciousness:  alert and awake    Attention: attention span and concentration were  age appropriate   Insight:  limited   Judgment: limited   Gait/Station: normal gait/station   Motor Activity: no abnormal movements     Medications: current meds:   Current Facility-Administered Medications:     acetaminophen (TYLENOL) tablet 650 mg, Q6H PRN    acetaminophen (TYLENOL) tablet 650 mg, Q4H PRN    acetaminophen (TYLENOL) tablet 975 mg, Q6H PRN    aluminum-magnesium hydroxide-simethicone (MAALOX) oral suspension 30 mL, Q4H PRN    ARIPiprazole (ABILIFY) tablet 5 mg, Daily    Artificial Tears ophthalmic solution 1 drop, Q3H PRN    atorvastatin (LIPITOR) tablet 10 mg, Daily With Dinner    benztropine (COGENTIN) injection 1 mg, Q4H PRN Max 6/day    benztropine (COGENTIN) tablet 1 mg, Q4H PRN Max 6/day    Cholecalciferol (VITAMIN D3) tablet 2,000 Units, Daily    cyanocobalamin (VITAMIN B-12) tablet 1,000 mcg, Daily    Diclofenac Sodium (VOLTAREN) 1 % topical gel 2 g, 4x Daily PRN    hydrOXYzine HCL (ATARAX) tablet 50 mg, Q6H PRN Max 4/day **OR** diphenhydrAMINE (BENADRYL) injection 50 mg, Q6H PRN    diphenhydrAMINE-zinc acetate (BENADRYL) 2-0.1 % cream, Daily PRN    famotidine (PEPCID) tablet 20 mg, BID    hydrOXYzine HCL (ATARAX) tablet 100 mg, Q6H PRN Max 4/day **OR** LORazepam (ATIVAN) injection 2 mg, Q6H PRN    hydrOXYzine HCL (ATARAX) tablet 25 mg, Q6H PRN Max 4/day    levothyroxine tablet 37.5 mcg, Early Morning    melatonin tablet 6 mg, HS PRN    methocarbamol (ROBAXIN) tablet 500 mg, Q6H PRN    OLANZapine (ZyPREXA) tablet 5 mg, Q4H PRN Max 3/day **OR** OLANZapine (ZyPREXA) IM injection 2.5 mg, Q4H PRN Max 3/day    OLANZapine (ZyPREXA) tablet 5 mg, Q3H PRN Max 3/day **OR** OLANZapine (ZyPREXA) IM injection 5 mg, Q3H PRN Max 3/day    OLANZapine (ZyPREXA) tablet 2.5 mg, Q4H PRN Max 6/day    polyethylene glycol (MIRALAX) packet 17 g, Daily PRN    propranolol (INDERAL) tablet 10 mg, Q8H PRN    senna-docusate sodium (SENOKOT S) 8.6-50 mg per tablet 1 tablet, Daily PRN    sertraline (ZOLOFT) tablet 150 mg,  Daily    traZODone (DESYREL) tablet 50 mg, HS PRN.      Lab Results: I have reviewed the following results: CBC/BMP: No new results in last 24 hours.   Most Recent Labs:   Lab Results   Component Value Date    WBC 7.01 06/27/2024    RBC 4.54 06/27/2024    HGB 14.5 06/27/2024    HCT 44.4 06/27/2024     06/27/2024    RDW 12.0 06/27/2024    NEUTROABS 4.01 06/27/2024    SODIUM 139 07/10/2024    K 3.8 07/10/2024     07/10/2024    CO2 27 07/10/2024    BUN 16 07/10/2024    CREATININE 0.92 07/10/2024    GLUC 87 07/10/2024    GLUF 87 07/10/2024    CALCIUM 9.3 07/10/2024    AST 28 07/10/2024    ALT 36 07/10/2024    ALKPHOS 66 07/10/2024    TP 7.7 07/10/2024    ALB 4.4 07/10/2024    TBILI 0.55 07/10/2024    CHOLESTEROL 159 08/09/2024    HDL 54 08/09/2024    TRIG 88 08/09/2024    LDLCALC 87 08/09/2024    NONHDLC 105 08/09/2024    [id number] 6.241 (H) 09/10/2024    FREET4 0.54 (L) 07/10/2024       Administrative Statements   I have spent a total time of 15-20 minutes in caring for this patient on the day of the visit/encounter including Risks and benefits of tx options, Instructions for management, Impressions, Counseling / Coordination of care, Documenting in the medical record, and Communicating with other healthcare professionals .

## 2024-09-10 NOTE — NURSING NOTE
Pt denies SI/HI/AH/VH. Pt isolative to room and self. Pt appears depressed. Medication and meal compliant. Pt is disinterested in participating in the milieu stating that he didn't sleep well last night and he is tired. No further concerns as of present. Plan of care ongoing.

## 2024-09-10 NOTE — PLAN OF CARE
Problem: Ineffective Coping  Goal: Identifies ineffective coping skills  Outcome: Progressing  Goal: Identifies healthy coping skills  Outcome: Progressing  Goal: Demonstrates healthy coping skills  Outcome: Progressing  Goal: Participates in unit activities  Description: Interventions:  - Provide therapeutic environment   - Provide required programming   - Redirect inappropriate behaviors   Outcome: Progressing     Problem: Risk for Self Injury/Neglect  Goal: Treatment Goal: Remain safe during length of stay, learn and adopt new coping skills, and be free of self-injurious ideation, impulses and acts at the time of discharge  Outcome: Progressing     Problem: Depression  Goal: Treatment Goal: Demonstrate behavioral control of depressive symptoms, verbalize feelings of improved mood/affect, and adopt new coping skills prior to discharge  Outcome: Progressing  Goal: Verbalize thoughts and feelings  Description: Interventions:  - Assess and re-assess patient's level of risk   - Engage patient in 1:1 interactions, daily, for a minimum of 15 minutes   - Encourage patient to express feelings, fears, frustrations, hopes   Outcome: Progressing  Goal: Refrain from harming self  Description: Interventions:  - Monitor patient closely, per order   - Supervise medication ingestion, monitor effects and side effects   Outcome: Progressing  Goal: Refrain from isolation  Description: Interventions:  - Develop a trusting relationship   - Encourage socialization   Outcome: Progressing  Goal: Refrain from self-neglect  Outcome: Progressing  Goal: Attend and participate in unit activities, including therapeutic, recreational, and educational groups  Description: Interventions:  - Provide therapeutic and educational activities daily, encourage attendance and participation, and document same in the medical record   Outcome: Progressing  Goal: Complete daily ADLs, including personal hygiene independently, as able  Description:  Interventions:  - Observe, teach, and assist patient with ADLS  -  Monitor and promote a balance of rest/activity, with adequate nutrition and elimination   Outcome: Progressing     Problem: Anxiety  Goal: Anxiety is at manageable level  Description: Interventions:  - Assess and monitor patient's anxiety level.   - Monitor for signs and symptoms (heart palpitations, chest pain, shortness of breath, headaches, nausea, feeling jumpy, restlessness, irritable, apprehensive).   - Collaborate with interdisciplinary team and initiate plan and interventions as ordered.  - [place] patient to unit/surroundings  - Explain treatment plan  - Encourage participation in care  - Encourage verbalization of concerns/fears  - Identify coping mechanisms  - Assist in developing anxiety-reducing skills  - Administer/offer alternative therapies  - Limit or eliminate stimulants  Outcome: Progressing     Problem: Knowledge Deficit  Goal: Patient/family/caregiver demonstrates understanding of disease process, treatment plan, medications, and discharge instructions  Description: Complete learning assessment and assess knowledge base.  Interventions:  - Provide teaching at level of understanding  - Provide teaching via preferred learning methods  Outcome: Progressing     Problem: SLEEP DISTURBANCE  Goal: Will exhibit normal sleeping pattern  Description: Interventions:  -  Assess the patients sleep pattern, noting recent changes  - Administer medication as ordered  - Decrease environmental stimuli, including noise, as appropriate during the night  - Encourage the patient to actively participate in unit groups and or exercise during the day to enhance ability to achieve adequate sleep at night  - Assess the patient, in the morning, encouraging a description of sleep experience  Outcome: Progressing

## 2024-09-10 NOTE — NURSING NOTE
Periodically about the unit. Quiet and to himself. Denies symptoms although appears sad/depressed. Compliant with medications. Waiting placement.

## 2024-09-10 NOTE — PROGRESS NOTES
09/10/24 0842   Team Meeting   Meeting Type Daily Rounds   Team Members Present   Team Members Present Physician;;Nurse   Physician Team Member Alex   [facility]   Care Management Team Member Noa   Patient/Family Present   Patient Present No   Patient's Family Present No       Pt is medication and meal compliant. Pt is social with select peers and staff. Pt is visible on the unit. Pt's discharge is pending Group home.

## 2024-09-10 NOTE — PLAN OF CARE
Problem: Ineffective Coping  Goal: Identifies healthy coping skills  Outcome: Progressing     Problem: Depression  Goal: Treatment Goal: Demonstrate behavioral control of depressive symptoms, verbalize feelings of improved mood/affect, and adopt new coping skills prior to discharge  Outcome: Progressing  Goal: Verbalize thoughts and feelings  Description: Interventions:  - Assess and re-assess patient's level of risk   - Engage patient in 1:1 interactions, daily, for a minimum of 15 minutes   - Encourage patient to express feelings, fears, frustrations, hopes   Outcome: Progressing  Goal: Refrain from harming self  Description: Interventions:  - Monitor patient closely, per order   - Supervise medication ingestion, monitor effects and side effects   Outcome: Progressing     Problem: Anxiety  Goal: Anxiety is at manageable level  Description: Interventions:  - Assess and monitor patient's anxiety level.   - Monitor for signs and symptoms (heart palpitations, chest pain, shortness of breath, headaches, nausea, feeling jumpy, restlessness, irritable, apprehensive).   - Collaborate with interdisciplinary team and initiate plan and interventions as ordered.  - [place] patient to unit/surroundings  - Explain treatment plan  - Encourage participation in care  - Encourage verbalization of concerns/fears  - Identify coping mechanisms  - Assist in developing anxiety-reducing skills  - Administer/offer alternative therapies  - Limit or eliminate stimulants  Outcome: Progressing     Problem: Ineffective Coping  Goal: Participates in unit activities  Description: Interventions:  - Provide therapeutic environment   - Provide required programming   - Redirect inappropriate behaviors   Outcome: Not Progressing     Problem: Depression  Goal: Refrain from isolation  Description: Interventions:  - Develop a trusting relationship   - Encourage socialization   Outcome: Not Progressing

## 2024-09-11 PROCEDURE — 99232 SBSQ HOSP IP/OBS MODERATE 35: CPT | Performed by: STUDENT IN AN ORGANIZED HEALTH CARE EDUCATION/TRAINING PROGRAM

## 2024-09-11 RX ADMIN — SERTRALINE HYDROCHLORIDE 150 MG: 100 TABLET ORAL at 08:16

## 2024-09-11 RX ADMIN — ARIPIPRAZOLE 5 MG: 5 TABLET ORAL at 08:16

## 2024-09-11 RX ADMIN — CHOLECALCIFEROL TAB 25 MCG (1000 UNIT) 2000 UNITS: 25 TAB at 08:16

## 2024-09-11 RX ADMIN — FAMOTIDINE 20 MG: 20 TABLET ORAL at 08:16

## 2024-09-11 RX ADMIN — CYANOCOBALAMIN TAB 1000 MCG 1000 MCG: 1000 TAB at 08:16

## 2024-09-11 RX ADMIN — FAMOTIDINE 20 MG: 20 TABLET ORAL at 17:18

## 2024-09-11 RX ADMIN — LEVOTHYROXINE SODIUM 37.5 MCG: 125 TABLET ORAL at 06:09

## 2024-09-11 RX ADMIN — ATORVASTATIN CALCIUM 10 MG: 10 TABLET, FILM COATED ORAL at 16:45

## 2024-09-11 NOTE — NURSING NOTE
"PT observed visible, cooperative and calm on assessment, denies SI/HI/VH/AH, appears depressed but stated \"I will be okay, I know staying here this long is depressing but will be fine\".  No scheduled meds and no unmet needs.   "

## 2024-09-11 NOTE — PROGRESS NOTES
Progress Note - Behavioral Health   Name: Franco Roberson 39 y.o. male I MRN: [id number]  Unit/Bed#: -02 I Date of Admission: 5/14/2024   Date of Service: 9/11/2024 I Hospital Day: 120     Assessment & Plan  MDD (major depressive disorder), recurrent severe, without psychosis (HCC)  -Continue Abilify 5mg Daily  -Continue Sertraline 150mg Daily     Unspecified depressive disorder (HCC)    Medical clearance for psychiatric admission    Hyperlipidemia    Vitamin D deficiency    B12 deficiency    Hypothyroidism    Rule Out Autism spectrum disorder      Progress Toward Goals: Improving    Recommended Treatment: Continue with group therapy, milieu therapy and occupational therapy.      Risks, benefits and possible side effects of Medications:   Risks, benefits, and possible side effects of medications explained to patient and patient verbalizes understanding.      History of Present Illness   Behavior over the last 24 hours:  improved  Sleep: normal  Appetite: normal  Medication side effects: No  ROS: no complaints    Subjective: Patient endorsed getting better sleep yesterday after staying up all night the night before. Denied any significant issues yesterday. Spoke about the presidential debate. Stated he did not see it and was not keeping up with politics. Patient endorsing tolerating medications well.     Objective   Mental Status Evaluation:  Appearance:  age appropriate   Behavior:  Cooperative   Speech:  normal volume   Mood:  euthymic   Affect:  mood-congruent   Thought Process:  goal directed and logical   Associations: intact associations   Thought Content:  normal   Perceptual Disturbances: None   Risk Potential: Suicidal Ideations none  Homicidal Ideations none  Potential for Aggression No   Sensorium:  person, place, time/date, and situation   Memory:  recent and remote memory grossly intact   Consciousness:  alert and awake    Attention: attention span and concentration were age appropriate   Insight:   limited   Judgment: limited   Gait/Station: normal gait/station   Motor Activity: no abnormal movements     Medications: current meds:   Current Facility-Administered Medications:     acetaminophen (TYLENOL) tablet 650 mg, Q6H PRN    acetaminophen (TYLENOL) tablet 650 mg, Q4H PRN    acetaminophen (TYLENOL) tablet 975 mg, Q6H PRN    aluminum-magnesium hydroxide-simethicone (MAALOX) oral suspension 30 mL, Q4H PRN    ARIPiprazole (ABILIFY) tablet 5 mg, Daily    Artificial Tears ophthalmic solution 1 drop, Q3H PRN    atorvastatin (LIPITOR) tablet 10 mg, Daily With Dinner    benztropine (COGENTIN) injection 1 mg, Q4H PRN Max 6/day    benztropine (COGENTIN) tablet 1 mg, Q4H PRN Max 6/day    Cholecalciferol (VITAMIN D3) tablet 2,000 Units, Daily    cyanocobalamin (VITAMIN B-12) tablet 1,000 mcg, Daily    Diclofenac Sodium (VOLTAREN) 1 % topical gel 2 g, 4x Daily PRN    hydrOXYzine HCL (ATARAX) tablet 50 mg, Q6H PRN Max 4/day **OR** diphenhydrAMINE (BENADRYL) injection 50 mg, Q6H PRN    diphenhydrAMINE-zinc acetate (BENADRYL) 2-0.1 % cream, Daily PRN    famotidine (PEPCID) tablet 20 mg, BID    hydrOXYzine HCL (ATARAX) tablet 100 mg, Q6H PRN Max 4/day **OR** LORazepam (ATIVAN) injection 2 mg, Q6H PRN    hydrOXYzine HCL (ATARAX) tablet 25 mg, Q6H PRN Max 4/day    levothyroxine tablet 37.5 mcg, Early Morning    melatonin tablet 6 mg, HS PRN    methocarbamol (ROBAXIN) tablet 500 mg, Q6H PRN    OLANZapine (ZyPREXA) tablet 5 mg, Q4H PRN Max 3/day **OR** OLANZapine (ZyPREXA) IM injection 2.5 mg, Q4H PRN Max 3/day    OLANZapine (ZyPREXA) tablet 5 mg, Q3H PRN Max 3/day **OR** OLANZapine (ZyPREXA) IM injection 5 mg, Q3H PRN Max 3/day    OLANZapine (ZyPREXA) tablet 2.5 mg, Q4H PRN Max 6/day    polyethylene glycol (MIRALAX) packet 17 g, Daily PRN    propranolol (INDERAL) tablet 10 mg, Q8H PRN    senna-docusate sodium (SENOKOT S) 8.6-50 mg per tablet 1 tablet, Daily PRN    sertraline (ZOLOFT) tablet 150 mg, Daily    traZODone (DESYREL)  tablet 50 mg, HS PRN.      Lab Results: I have reviewed the following results: CBC/BMP: No new results in last 24 hours.   Most Recent Labs:   Lab Results   Component Value Date    WBC 7.01 06/27/2024    RBC 4.54 06/27/2024    HGB 14.5 06/27/2024    HCT 44.4 06/27/2024     06/27/2024    RDW 12.0 06/27/2024    NEUTROABS 4.01 06/27/2024    SODIUM 139 07/10/2024    K 3.8 07/10/2024     07/10/2024    CO2 27 07/10/2024    BUN 16 07/10/2024    CREATININE 0.92 07/10/2024    GLUC 87 07/10/2024    GLUF 87 07/10/2024    CALCIUM 9.3 07/10/2024    AST 28 07/10/2024    ALT 36 07/10/2024    ALKPHOS 66 07/10/2024    TP 7.7 07/10/2024    ALB 4.4 07/10/2024    TBILI 0.55 07/10/2024    CHOLESTEROL 159 08/09/2024    HDL 54 08/09/2024    TRIG 88 08/09/2024    LDLCALC 87 08/09/2024    NONHDLC 105 08/09/2024    [id number] 6.241 (H) 09/10/2024    FREET4 0.56 (L) 09/10/2024       Administrative Statements   I have spent a total time of 15-20 minutes in caring for this patient on the day of the visit/encounter including Instructions for management, Counseling / Coordination of care, Documenting in the medical record, Obtaining or reviewing history  , and Communicating with other healthcare professionals .

## 2024-09-11 NOTE — SOCIAL WORK
Cm called [facility] and [facility] in [facility].  Cm left call back information for [facility] to the .     Cm called [facility]  and left voicemail on answering machine and left call back information.

## 2024-09-11 NOTE — NURSING NOTE
Visible at times and attending some groups. Pleasant, cooperative. Denies symptoms. Reports sleeping better last night than the night prior. Compliant with medications. Waiting placement.

## 2024-09-11 NOTE — PLAN OF CARE
Pt has been attending groups less lately. Pt reports he has been feeling some increased depression and wanting time to be alone.

## 2024-09-11 NOTE — SOCIAL WORK
Cm called [facility] regarding Pt's situation. Cm thoroughly explained Pt's state and situation currently and prior to admission. They explained that they will pass on the word to the  and have them call Cm tomorrow.    Cm called [facility] and left a voicemail with call back information.

## 2024-09-11 NOTE — PROGRESS NOTES
09/11/24 0846   Team Meeting   Meeting Type Daily Rounds   Team Members Present   Team Members Present Physician;Nurse;   Physician Team Member Alex   [facility]   Care Management Team Member Noa   Patient/Family Present   Patient Present No   Patient's Family Present No     Pt is medication and meal compliant. Pt is social with select peers and staff. Pt is visible on the unit. Pt's discharge is pending Group home.

## 2024-09-12 PROCEDURE — 99232 SBSQ HOSP IP/OBS MODERATE 35: CPT | Performed by: STUDENT IN AN ORGANIZED HEALTH CARE EDUCATION/TRAINING PROGRAM

## 2024-09-12 RX ADMIN — FAMOTIDINE 20 MG: 20 TABLET ORAL at 08:02

## 2024-09-12 RX ADMIN — ARIPIPRAZOLE 5 MG: 5 TABLET ORAL at 08:02

## 2024-09-12 RX ADMIN — LEVOTHYROXINE SODIUM 37.5 MCG: 125 TABLET ORAL at 06:32

## 2024-09-12 RX ADMIN — CYANOCOBALAMIN TAB 1000 MCG 1000 MCG: 1000 TAB at 08:02

## 2024-09-12 RX ADMIN — FAMOTIDINE 20 MG: 20 TABLET ORAL at 17:25

## 2024-09-12 RX ADMIN — CHOLECALCIFEROL TAB 25 MCG (1000 UNIT) 2000 UNITS: 25 TAB at 08:02

## 2024-09-12 RX ADMIN — ATORVASTATIN CALCIUM 10 MG: 10 TABLET, FILM COATED ORAL at 17:25

## 2024-09-12 RX ADMIN — SERTRALINE HYDROCHLORIDE 150 MG: 100 TABLET ORAL at 08:02

## 2024-09-12 NOTE — NURSING NOTE
"Patient noted walking on the unit and out for meals, appears to withdrawn and depressed  but when questioned reports\"feeling fine\". Patient denies SI/HI/AH/VH at this time, compliant with medication and routine vitals.   "

## 2024-09-12 NOTE — TREATMENT PLAN
TREATMENT PLAN REVIEW - Behavioral Health Franco Roberson 39 y.o. 1985 male MRN: [id number]    [facility]B [facility] Room / Bed: [facility] 345/[facility] 345-02 Encounter: [id number]          Admit Date/Time:  5/14/2024  4:45 PM    Treatment Team:   MD Isaac Nina, LINDSAY Rivera    Diagnosis: Principal Problem:    MDD (major depressive disorder), recurrent severe, without psychosis (HCC)  Active Problems:    Unspecified depressive disorder (HCC)    Medical clearance for psychiatric admission    Hyperlipidemia    Vitamin D deficiency    B12 deficiency    Hypothyroidism    Rule Out Autism spectrum disorder      Patient Strengths/Assets: adapts well, cooperative    Patient Barriers/Limitations: homeless, lack of financial means, limited education    Short Term Goals: decrease in depressive symptoms, decrease in anxiety symptoms, decrease in level of agitation, ability to stay safe on the unit, ability to stay free of restraints    Long Term Goals: improvement in depression, improvement in anxiety, stabilization of mood, free of suicidal thoughts, improved insight, adequate sleep, adequate appetite    Progress Towards Goals: starting psychiatric medications as prescribed, attends groups, participates in milieu therapy    Recommended Treatment: medication management, patient medication education, group therapy, milieu therapy, continued Behavioral Health psychiatric evaluation/assessment process    Treatment Frequency: daily medication monitoring, group and milieu therapy daily, monitoring through interdisciplinary rounds, monitoring through weekly patient care conferences    Expected Discharge Date:  10/12/24    Discharge Plan: attend aftercare/continuing care group    Treatment Plan Created/Updated By: Alden Johnson MD

## 2024-09-12 NOTE — NURSING NOTE
Pleasant but scant during interaction. Denies symptoms but appears depressed. Limited interaction with peers. Visible at times. Compliant with medications. Waiting placement.

## 2024-09-12 NOTE — PROGRESS NOTES
09/12/24 0855   Team Meeting   Meeting Type Daily Rounds   Team Members Present   Team Members Present Physician;Nurse;   Physician Team Member Alex   [facility]   Care Management Team Member Noa   Patient/Family Present   Patient Present No   Patient's Family Present No     Pt is medication and meal compliant. Pt is socia with select peers and staff. Pt discharge is pending placement with group home.

## 2024-09-12 NOTE — PROGRESS NOTES
Progress Note - Behavioral Health   Name: Franco Roberson 39 y.o. male I MRN: [id number]  Unit/Bed#: -02 I Date of Admission: 5/14/2024   Date of Service: 9/12/2024 I Hospital Day: 121     Assessment & Plan  MDD (major depressive disorder), recurrent severe, without psychosis (HCC)  -Continue Abilify 5mg Daily  -Continue Sertraline 150mg Daily     Unspecified depressive disorder (HCC)    Medical clearance for psychiatric admission    Hyperlipidemia    Vitamin D deficiency    B12 deficiency    Hypothyroidism    Rule Out Autism spectrum disorder      Progress Toward Goals: Improved    Recommended Treatment: Continue with group therapy, milieu therapy and occupational therapy.      Risks, benefits and possible side effects of Medications:   Risks, benefits, and possible side effects of medications explained to patient and patient verbalizes understanding.      History of Present Illness   Behavior over the last 24 hours:  improved  Sleep: normal  Appetite: normal  Medication side effects: No  ROS: no complaints    Subjective: Patient endorsed doing okay. Did not have any significant events yesterday. Spoke with patient about his life prior to coming to the hospital. 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 States that he has been with his mother most of his life. States his father passed away when he was 3 y/o. States he had friends in elementary school but not as many as an adult. Endorsed having one good friend name Glenn who was also disabled. States he mostly plays games like [employer] when he was younger. Spoke about how that game may be on newer devices these days like Ipads and phones    Objective   Mental Status Evaluation:  Appearance:  age appropriate   Behavior:  Cooperative   Speech:  normal volume   Mood:  euthymic   Affect:  mood-congruent   Thought Process:  goal directed and logical   Associations: intact associations    Thought Content:  normal   Perceptual Disturbances: None   Risk Potential: Suicidal Ideations none  Homicidal Ideations none  Potential for Aggression No   Sensorium:  person, place, time/date, and situation   Memory:  recent and remote memory grossly intact   Consciousness:  alert and awake    Attention: attention span and concentration were age appropriate   Insight:  limited   Judgment: limited   Gait/Station: Patient sitting in bed   Motor Activity: no abnormal movements     Medications: current meds:   Current Facility-Administered Medications:     acetaminophen (TYLENOL) tablet 650 mg, Q6H PRN    acetaminophen (TYLENOL) tablet 650 mg, Q4H PRN    acetaminophen (TYLENOL) tablet 975 mg, Q6H PRN    aluminum-magnesium hydroxide-simethicone (MAALOX) oral suspension 30 mL, Q4H PRN    ARIPiprazole (ABILIFY) tablet 5 mg, Daily    Artificial Tears ophthalmic solution 1 drop, Q3H PRN    atorvastatin (LIPITOR) tablet 10 mg, Daily With Dinner    benztropine (COGENTIN) injection 1 mg, Q4H PRN Max 6/day    benztropine (COGENTIN) tablet 1 mg, Q4H PRN Max 6/day    Cholecalciferol (VITAMIN D3) tablet 2,000 Units, Daily    cyanocobalamin (VITAMIN B-12) tablet 1,000 mcg, Daily    Diclofenac Sodium (VOLTAREN) 1 % topical gel 2 g, 4x Daily PRN    hydrOXYzine HCL (ATARAX) tablet 50 mg, Q6H PRN Max 4/day **OR** diphenhydrAMINE (BENADRYL) injection 50 mg, Q6H PRN    diphenhydrAMINE-zinc acetate (BENADRYL) 2-0.1 % cream, Daily PRN    famotidine (PEPCID) tablet 20 mg, BID    hydrOXYzine HCL (ATARAX) tablet 100 mg, Q6H PRN Max 4/day **OR** LORazepam (ATIVAN) injection 2 mg, Q6H PRN    hydrOXYzine HCL (ATARAX) tablet 25 mg, Q6H PRN Max 4/day    levothyroxine tablet 37.5 mcg, Early Morning    melatonin tablet 6 mg, HS PRN    methocarbamol (ROBAXIN) tablet 500 mg, Q6H PRN    OLANZapine (ZyPREXA) tablet 5 mg, Q4H PRN Max 3/day **OR** OLANZapine (ZyPREXA) IM injection 2.5 mg, Q4H PRN Max 3/day    OLANZapine (ZyPREXA) tablet 5 mg, Q3H PRN  Max 3/day **OR** OLANZapine (ZyPREXA) IM injection 5 mg, Q3H PRN Max 3/day    OLANZapine (ZyPREXA) tablet 2.5 mg, Q4H PRN Max 6/day    polyethylene glycol (MIRALAX) packet 17 g, Daily PRN    propranolol (INDERAL) tablet 10 mg, Q8H PRN    senna-docusate sodium (SENOKOT S) 8.6-50 mg per tablet 1 tablet, Daily PRN    sertraline (ZOLOFT) tablet 150 mg, Daily    traZODone (DESYREL) tablet 50 mg, HS PRN.      Lab Results: I have reviewed the following results: CBC/BMP: No new results in last 24 hours.   Most Recent Labs:   Lab Results   Component Value Date    WBC 7.01 06/27/2024    RBC 4.54 06/27/2024    HGB 14.5 06/27/2024    HCT 44.4 06/27/2024     06/27/2024    RDW 12.0 06/27/2024    NEUTROABS 4.01 06/27/2024    SODIUM 139 07/10/2024    K 3.8 07/10/2024     07/10/2024    CO2 27 07/10/2024    BUN 16 07/10/2024    CREATININE 0.92 07/10/2024    GLUC 87 07/10/2024    GLUF 87 07/10/2024    CALCIUM 9.3 07/10/2024    AST 28 07/10/2024    ALT 36 07/10/2024    ALKPHOS 66 07/10/2024    TP 7.7 07/10/2024    ALB 4.4 07/10/2024    TBILI 0.55 07/10/2024    CHOLESTEROL 159 08/09/2024    HDL 54 08/09/2024    TRIG 88 08/09/2024    LDLCALC 87 08/09/2024    NONHDLC 105 08/09/2024    [id number] 6.241 (H) 09/10/2024    FREET4 0.56 (L) 09/10/2024       Administrative Statements   I have spent a total time of 15-20 minutes in caring for this patient on the day of the visit/encounter including Instructions for management, Patient and family education, Importance of tx compliance, Impressions, Counseling / Coordination of care, Documenting in the medical record, and Communicating with other healthcare professionals .

## 2024-09-13 PROCEDURE — 99232 SBSQ HOSP IP/OBS MODERATE 35: CPT | Performed by: STUDENT IN AN ORGANIZED HEALTH CARE EDUCATION/TRAINING PROGRAM

## 2024-09-13 RX ADMIN — FAMOTIDINE 20 MG: 20 TABLET ORAL at 17:24

## 2024-09-13 RX ADMIN — ATORVASTATIN CALCIUM 10 MG: 10 TABLET, FILM COATED ORAL at 17:24

## 2024-09-13 RX ADMIN — CYANOCOBALAMIN TAB 1000 MCG 1000 MCG: 1000 TAB at 08:19

## 2024-09-13 RX ADMIN — ARIPIPRAZOLE 5 MG: 5 TABLET ORAL at 08:19

## 2024-09-13 RX ADMIN — FAMOTIDINE 20 MG: 20 TABLET ORAL at 08:19

## 2024-09-13 RX ADMIN — SERTRALINE HYDROCHLORIDE 150 MG: 100 TABLET ORAL at 08:19

## 2024-09-13 RX ADMIN — LEVOTHYROXINE SODIUM 37.5 MCG: 125 TABLET ORAL at 06:47

## 2024-09-13 RX ADMIN — CHOLECALCIFEROL TAB 25 MCG (1000 UNIT) 2000 UNITS: 25 TAB at 08:19

## 2024-09-13 NOTE — NURSING NOTE
Visible intermittently. Doing laps in the hallway. Pleasant. Denies symptoms. Compliant with medications. Waiting placement.

## 2024-09-13 NOTE — PLAN OF CARE
Problem: Ineffective Coping  Goal: Identifies ineffective coping skills  Outcome: Progressing  Goal: Identifies healthy coping skills  Outcome: Progressing     Problem: Risk for Self Injury/Neglect  Goal: Treatment Goal: Remain safe during length of stay, learn and adopt new coping skills, and be free of self-injurious ideation, impulses and acts at the time of discharge  Outcome: Progressing     Problem: Depression  Goal: Treatment Goal: Demonstrate behavioral control of depressive symptoms, verbalize feelings of improved mood/affect, and adopt new coping skills prior to discharge  Outcome: Progressing  Goal: Verbalize thoughts and feelings  Description: Interventions:  - Assess and re-assess patient's level of risk   - Engage patient in 1:1 interactions, daily, for a minimum of 15 minutes   - Encourage patient to express feelings, fears, frustrations, hopes   Outcome: Progressing  Goal: Refrain from harming self  Description: Interventions:  - Monitor patient closely, per order   - Supervise medication ingestion, monitor effects and side effects   Outcome: Progressing  Goal: Attend and participate in unit activities, including therapeutic, recreational, and educational groups  Description: Interventions:  - Provide therapeutic and educational activities daily, encourage attendance and participation, and document same in the medical record   Outcome: Progressing  Goal: Complete daily ADLs, including personal hygiene independently, as able  Description: Interventions:  - Observe, teach, and assist patient with ADLS  -  Monitor and promote a balance of rest/activity, with adequate nutrition and elimination   Outcome: Progressing     Problem: Anxiety  Goal: Anxiety is at manageable level  Description: Interventions:  - Assess and monitor patient's anxiety level.   - Monitor for signs and symptoms (heart palpitations, chest pain, shortness of breath, headaches, nausea, feeling jumpy, restlessness, irritable,  apprehensive).   - Collaborate with interdisciplinary team and initiate plan and interventions as ordered.  - [place] patient to unit/surroundings  - Explain treatment plan  - Encourage participation in care  - Encourage verbalization of concerns/fears  - Identify coping mechanisms  - Assist in developing anxiety-reducing skills  - Administer/offer alternative therapies  - Limit or eliminate stimulants  Outcome: Progressing     Problem: DISCHARGE PLANNING - CARE MANAGEMENT  Goal: Discharge to post-acute care or home with appropriate resources  Description: INTERVENTIONS:  - Conduct assessment to determine patient/family and health care team treatment goals, and need for post-acute services based on payer coverage, community resources, and patient preferences, and barriers to discharge  - Address psychosocial, clinical, and financial barriers to discharge as identified in assessment in conjunction with the patient/family and health care team  - Arrange appropriate level of post-acute services according to patient’s   needs and preference and payer coverage in collaboration with the physician and health care team  - Communicate with and update the patient/family, physician, and health care team regarding progress on the discharge plan  - Arrange appropriate transportation to post-acute venues  Outcome: Progressing     Problem: Knowledge Deficit  Goal: Patient/family/caregiver demonstrates understanding of disease process, treatment plan, medications, and discharge instructions  Description: Complete learning assessment and assess knowledge base.  Interventions:  - Provide teaching at level of understanding  - Provide teaching via preferred learning methods  Outcome: Progressing     Problem: SLEEP DISTURBANCE  Goal: Will exhibit normal sleeping pattern  Description: Interventions:  -  Assess the patients sleep pattern, noting recent changes  - Administer medication as ordered  - Decrease environmental stimuli, including  noise, as appropriate during the night  - Encourage the patient to actively participate in unit groups and or exercise during the day to enhance ability to achieve adequate sleep at night  - Assess the patient, in the morning, encouraging a description of sleep experience  Outcome: Progressing     Problem: Ineffective Coping  Goal: Demonstrates healthy coping skills  Outcome: Not Progressing     Problem: Depression  Goal: Refrain from isolation  Description: Interventions:  - Develop a trusting relationship   - Encourage socialization   Outcome: Not Progressing  Goal: Refrain from self-neglect  Outcome: Not Progressing

## 2024-09-13 NOTE — PROGRESS NOTES
09/13/24 0844   Team Meeting   Meeting Type Daily Rounds   Team Members Present   Team Members Present Physician;Nurse;   Physician Team Member Alex   [facility]   Care Management Team Member Noa   Patient/Family Present   Patient Present No   Patient's Family Present No     Pt is medication and meal compliant. Pt is social with select peers and staff. Pt is visible on the unit. Pt's discharge is pending group home.

## 2024-09-13 NOTE — NURSING NOTE
"Patient noted to be secluded to room this shift taking meals to room. Patient reports wanting to remain in room due to being an \"introvert\". Patient appears withdrawn and guarded.Compliant with medications and routine vitals. Denies SI/HI/AH/VH at this time.   "

## 2024-09-13 NOTE — PROGRESS NOTES
Progress Note - Behavioral Health   Name: Franco Roberson 39 y.o. male I MRN: [id number]  Unit/Bed#: -02 I Date of Admission: 5/14/2024   Date of Service: 9/13/2024 I Hospital Day: 122     Assessment & Plan  MDD (major depressive disorder), recurrent severe, without psychosis (HCC)  -Continue Abilify 5mg Daily  -Continue Sertraline 150mg Daily     Unspecified depressive disorder (HCC)    Medical clearance for psychiatric admission    Hyperlipidemia    Vitamin D deficiency    B12 deficiency    Hypothyroidism    Rule Out Autism spectrum disorder      Progress Toward Goals: Improved    Recommended Treatment: Continue with group therapy, milieu therapy and occupational therapy.      Risks, benefits and possible side effects of Medications:   Risks, benefits, and possible side effects of medications explained to patient and patient verbalizes understanding.      History of Present Illness   Behavior over the last 24 hours:  improved  Sleep: normal  Appetite: normal  Medication side effects: No  ROS: no complaints    Subjective: Patient endorsed doing okay. Spoke about patient's chicken pot pie he had in his room for lunch. States he recently got news from his  that there were 2 group homes they were looking into. Endorsed feeling excited about the possibility he would go there. Patient denied any complaints.     Objective   Mental Status Evaluation:  Appearance:  age appropriate   Behavior:  normal   Speech:  normal volume   Mood:  euphoric   Affect:  mood-congruent   Thought Process:  goal directed and logical   Associations: intact associations   Thought Content:  normal   Perceptual Disturbances: None   Risk Potential: Suicidal Ideations none  Homicidal Ideations none  Potential for Aggression No   Sensorium:  person, place, time/date, and situation   Memory:  recent and remote memory grossly intact   Consciousness:  alert and awake    Attention: attention span and concentration were age appropriate    Insight:  limited   Judgment: limited   Gait/Station: normal gait/station   Motor Activity: no abnormal movements     Medications: current meds:   Current Facility-Administered Medications:     acetaminophen (TYLENOL) tablet 650 mg, Q6H PRN    acetaminophen (TYLENOL) tablet 650 mg, Q4H PRN    acetaminophen (TYLENOL) tablet 975 mg, Q6H PRN    aluminum-magnesium hydroxide-simethicone (MAALOX) oral suspension 30 mL, Q4H PRN    ARIPiprazole (ABILIFY) tablet 5 mg, Daily    Artificial Tears ophthalmic solution 1 drop, Q3H PRN    atorvastatin (LIPITOR) tablet 10 mg, Daily With Dinner    benztropine (COGENTIN) injection 1 mg, Q4H PRN Max 6/day    benztropine (COGENTIN) tablet 1 mg, Q4H PRN Max 6/day    Cholecalciferol (VITAMIN D3) tablet 2,000 Units, Daily    cyanocobalamin (VITAMIN B-12) tablet 1,000 mcg, Daily    Diclofenac Sodium (VOLTAREN) 1 % topical gel 2 g, 4x Daily PRN    hydrOXYzine HCL (ATARAX) tablet 50 mg, Q6H PRN Max 4/day **OR** diphenhydrAMINE (BENADRYL) injection 50 mg, Q6H PRN    diphenhydrAMINE-zinc acetate (BENADRYL) 2-0.1 % cream, Daily PRN    famotidine (PEPCID) tablet 20 mg, BID    hydrOXYzine HCL (ATARAX) tablet 100 mg, Q6H PRN Max 4/day **OR** LORazepam (ATIVAN) injection 2 mg, Q6H PRN    hydrOXYzine HCL (ATARAX) tablet 25 mg, Q6H PRN Max 4/day    levothyroxine tablet 37.5 mcg, Early Morning    melatonin tablet 6 mg, HS PRN    methocarbamol (ROBAXIN) tablet 500 mg, Q6H PRN    OLANZapine (ZyPREXA) tablet 5 mg, Q4H PRN Max 3/day **OR** OLANZapine (ZyPREXA) IM injection 2.5 mg, Q4H PRN Max 3/day    OLANZapine (ZyPREXA) tablet 5 mg, Q3H PRN Max 3/day **OR** OLANZapine (ZyPREXA) IM injection 5 mg, Q3H PRN Max 3/day    OLANZapine (ZyPREXA) tablet 2.5 mg, Q4H PRN Max 6/day    polyethylene glycol (MIRALAX) packet 17 g, Daily PRN    propranolol (INDERAL) tablet 10 mg, Q8H PRN    senna-docusate sodium (SENOKOT S) 8.6-50 mg per tablet 1 tablet, Daily PRN    sertraline (ZOLOFT) tablet 150 mg, Daily     traZODone (DESYREL) tablet 50 mg, HS PRN.      Lab Results: I have reviewed the following results: CBC/BMP: No new results in last 24 hours.   Most Recent Labs:   Lab Results   Component Value Date    WBC 7.01 06/27/2024    RBC 4.54 06/27/2024    HGB 14.5 06/27/2024    HCT 44.4 06/27/2024     06/27/2024    RDW 12.0 06/27/2024    NEUTROABS 4.01 06/27/2024    SODIUM 139 07/10/2024    K 3.8 07/10/2024     07/10/2024    CO2 27 07/10/2024    BUN 16 07/10/2024    CREATININE 0.92 07/10/2024    GLUC 87 07/10/2024    GLUF 87 07/10/2024    CALCIUM 9.3 07/10/2024    AST 28 07/10/2024    ALT 36 07/10/2024    ALKPHOS 66 07/10/2024    TP 7.7 07/10/2024    ALB 4.4 07/10/2024    TBILI 0.55 07/10/2024    CHOLESTEROL 159 08/09/2024    HDL 54 08/09/2024    TRIG 88 08/09/2024    LDLCALC 87 08/09/2024    NONHDLC 105 08/09/2024    [id number] 6.241 (H) 09/10/2024    FREET4 0.56 (L) 09/10/2024       Administrative Statements   I have spent a total time of 15-20 minutes in caring for this patient on the day of the visit/encounter including Risks and benefits of tx options, Instructions for management, Patient and family education, Impressions, Counseling / Coordination of care, Documenting in the medical record, and Communicating with other healthcare professionals .

## 2024-09-14 PROCEDURE — 99232 SBSQ HOSP IP/OBS MODERATE 35: CPT | Performed by: STUDENT IN AN ORGANIZED HEALTH CARE EDUCATION/TRAINING PROGRAM

## 2024-09-14 RX ADMIN — CHOLECALCIFEROL TAB 25 MCG (1000 UNIT) 2000 UNITS: 25 TAB at 08:45

## 2024-09-14 RX ADMIN — LEVOTHYROXINE SODIUM 37.5 MCG: 125 TABLET ORAL at 06:18

## 2024-09-14 RX ADMIN — CYANOCOBALAMIN TAB 1000 MCG 1000 MCG: 1000 TAB at 08:45

## 2024-09-14 RX ADMIN — ARIPIPRAZOLE 5 MG: 5 TABLET ORAL at 08:45

## 2024-09-14 RX ADMIN — ATORVASTATIN CALCIUM 10 MG: 10 TABLET, FILM COATED ORAL at 17:33

## 2024-09-14 RX ADMIN — FAMOTIDINE 20 MG: 20 TABLET ORAL at 08:45

## 2024-09-14 RX ADMIN — FAMOTIDINE 20 MG: 20 TABLET ORAL at 17:33

## 2024-09-14 RX ADMIN — SERTRALINE HYDROCHLORIDE 150 MG: 100 TABLET ORAL at 08:45

## 2024-09-14 NOTE — NURSING NOTE
Pt is calm, cooperative, withdrawn in room. Pt seen in room this morning for medications. Pt denies SI, HI, AH, VH and pain at this time. Meal and medication compliant. Pt denies any unmet needs at this time. Pt remains on continuous rounding for safety.

## 2024-09-14 NOTE — NURSING NOTE
Pt visible and social with select peers, attending group.  Pt denies HI/SI/AVH and compliant with scheduled medications. No remarkable behaviors observed.

## 2024-09-14 NOTE — PLAN OF CARE
Problem: Ineffective Coping  Goal: Identifies ineffective coping skills  Outcome: Progressing  Goal: Identifies healthy coping skills  Outcome: Progressing  Goal: Demonstrates healthy coping skills  Outcome: Progressing  Goal: Participates in unit activities  Description: Interventions:  - Provide therapeutic environment   - Provide required programming   - Redirect inappropriate behaviors   Outcome: Progressing     Problem: Risk for Self Injury/Neglect  Goal: Treatment Goal: Remain safe during length of stay, learn and adopt new coping skills, and be free of self-injurious ideation, impulses and acts at the time of discharge  Outcome: Progressing     Problem: Depression  Goal: Treatment Goal: Demonstrate behavioral control of depressive symptoms, verbalize feelings of improved mood/affect, and adopt new coping skills prior to discharge  Outcome: Progressing  Goal: Verbalize thoughts and feelings  Description: Interventions:  - Assess and re-assess patient's level of risk   - Engage patient in 1:1 interactions, daily, for a minimum of 15 minutes   - Encourage patient to express feelings, fears, frustrations, hopes   Outcome: Progressing  Goal: Refrain from harming self  Description: Interventions:  - Monitor patient closely, per order   - Supervise medication ingestion, monitor effects and side effects   Outcome: Progressing  Goal: Refrain from isolation  Description: Interventions:  - Develop a trusting relationship   - Encourage socialization   Outcome: Progressing  Goal: Refrain from self-neglect  Outcome: Progressing  Goal: Attend and participate in unit activities, including therapeutic, recreational, and educational groups  Description: Interventions:  - Provide therapeutic and educational activities daily, encourage attendance and participation, and document same in the medical record   Outcome: Progressing  Goal: Complete daily ADLs, including personal hygiene independently, as able  Description:  Interventions:  - Observe, teach, and assist patient with ADLS  -  Monitor and promote a balance of rest/activity, with adequate nutrition and elimination   Outcome: Progressing     Problem: Anxiety  Goal: Anxiety is at manageable level  Description: Interventions:  - Assess and monitor patient's anxiety level.   - Monitor for signs and symptoms (heart palpitations, chest pain, shortness of breath, headaches, nausea, feeling jumpy, restlessness, irritable, apprehensive).   - Collaborate with interdisciplinary team and initiate plan and interventions as ordered.  - [place] patient to unit/surroundings  - Explain treatment plan  - Encourage participation in care  - Encourage verbalization of concerns/fears  - Identify coping mechanisms  - Assist in developing anxiety-reducing skills  - Administer/offer alternative therapies  - Limit or eliminate stimulants  Outcome: Progressing     Problem: DISCHARGE PLANNING - CARE MANAGEMENT  Goal: Discharge to post-acute care or home with appropriate resources  Description: INTERVENTIONS:  - Conduct assessment to determine patient/family and health care team treatment goals, and need for post-acute services based on payer coverage, community resources, and patient preferences, and barriers to discharge  - Address psychosocial, clinical, and financial barriers to discharge as identified in assessment in conjunction with the patient/family and health care team  - Arrange appropriate level of post-acute services according to patient’s   needs and preference and payer coverage in collaboration with the physician and health care team  - Communicate with and update the patient/family, physician, and health care team regarding progress on the discharge plan  - Arrange appropriate transportation to post-acute venues  Outcome: Progressing     Problem: Knowledge Deficit  Goal: Patient/family/caregiver demonstrates understanding of disease process, treatment plan, medications, and discharge  instructions  Description: Complete learning assessment and assess knowledge base.  Interventions:  - Provide teaching at level of understanding  - Provide teaching via preferred learning methods  Outcome: Progressing

## 2024-09-14 NOTE — PROGRESS NOTES
"Progress Note - Behavioral Health   Franco Roberson 39 y.o. male MRN: [id number]  Unit/Bed#: [facility] 345-02 Encounter: [id number]    Assessment       Assessment & Plan  MDD (major depressive disorder), recurrent severe, without psychosis (HCC)  -Continue Abilify 5mg Daily  -Continue Sertraline 150mg Daily     Rule Out Autism spectrum disorder         Plan    Patient is awaiting group home placement.  Continue current medications.  Medical management per SLIM.  Discharge Planning    ----------------------------------------      Subjective: Per nursing report patient has been doing fairly well.  He has remained somewhat sad about awaiting placement.  He has been calm and cooperative.  No acute behavioral issues overnight.  Patient is doing fairly well today.  He is constricted in affect but is pleasant and bright on approach.  He denies any SI, HI, or AVH.  He reports tolerating his medications well.  He is hopeful for further updates regarding group home placement.  He denies questions or concerns at this time.    Behavior over the last 24 hours:  unchanged  Sleep: normal  Appetite: normal  Medication side effects: No  ROS: no complaints    Mental Status Evaluation:  Appearance:  disheveled, marginal hygiene   Behavior:  pleasant, cooperative, calm   Speech:  slow, scant   Mood:  \"Okay\"   Affect:  constricted   Thought Process:  logical, goal directed, linear   Associations: concrete associations   Thought Content:  no overt delusions   Perceptual Disturbances: denies auditory or visual hallucinations when asked, does not appear responding to internal stimuli   Risk Potential: Suicidal ideation - None  Homicidal ideation - None  Potential for aggression - Not at present   Sensorium:  oriented to person, place, and time/date   Memory:  recent and remote memory grossly intact   Consciousness:  alert and awake   Attention/Concentration: attention span and concentration are age appropriate   Insight:  improving   Judgment: " improving   Gait/Station: normal gait/station, normal balance   Motor Activity: no abnormal movements     Medications: all current active meds have been reviewed and continue current psychiatric medications.  Current Facility-Administered Medications   Medication Dose Route Frequency Provider Last Rate    acetaminophen  650 mg Oral Q6H PRN Basilio Brown MD      acetaminophen  650 mg Oral Q4H PRN Basilio Brown MD      acetaminophen  975 mg Oral Q6H PRN Basilio Brown MD      aluminum-magnesium hydroxide-simethicone  30 mL Oral Q4H PRN Basilio Brown MD      ARIPiprazole  5 mg Oral Daily Basilio Panda MD      Artificial Tears  1 drop Both Eyes Q3H PRN Basilio Brown MD      atorvastatin  10 mg Oral Daily With Dinner WALLY Baptiste      benztropine  1 mg Intramuscular Q4H PRN Max 6/day Basilio Brown MD      benztropine  1 mg Oral Q4H PRN Max 6/day Basilio Brown MD      Cholecalciferol  2,000 Units Oral Daily WALLY Baptiste      cyanocobalamin  1,000 mcg Oral Daily WALLY Baptiste      Diclofenac Sodium  2 g Topical 4x Daily PRN WALLY Baptiste      hydrOXYzine HCL  50 mg Oral Q6H PRN Max 4/day Basilio Brown MD      Or    diphenhydrAMINE  50 mg Intramuscular Q6H PRN Basilio Brown MD      diphenhydrAMINE-zinc acetate   Topical Daily PRN Raj Guerrero MD      famotidine  20 mg Oral BID WALLY Baptiste      hydrOXYzine HCL  100 mg Oral Q6H PRN Max 4/day Basilio Brown MD      Or    LORazepam  2 mg Intramuscular Q6H PRN Basilio Brown MD      hydrOXYzine HCL  25 mg Oral Q6H PRN Max 4/day Basilio Brown MD      levothyroxine  37.5 mcg Oral Early Morning WALLY Baptiste      melatonin  6 mg Oral HS PRN WALLY Gomez      methocarbamol  500 mg Oral Q6H PRN WALLY Baptiste      OLANZapine  5 mg Oral Q4H PRN Max 3/day Basilio Brown MD      Or     OLANZapine  2.5 mg Intramuscular Q4H PRN Max 3/day Basilio Brown MD      OLANZapine  5 mg Oral Q3H PRN Max 3/day Basilio Brown MD      Or    OLANZapine  5 mg Intramuscular Q3H PRN Max 3/day Basilio Brown MD      OLANZapine  2.5 mg Oral Q4H PRN Max 6/day Basilio Brown MD      polyethylene glycol  17 g Oral Daily PRN Basilio Brown MD      propranolol  10 mg Oral Q8H PRN Basilio Brown MD      senna-docusate sodium  1 tablet Oral Daily PRN Basilio Brown MD      sertraline  150 mg Oral Daily WALLY Gomez      traZODone  50 mg Oral HS PRN Basilio Brown MD         Labs: I have personally reviewed all pertinent laboratory/tests results  Most Recent Labs:   Lab Results   Component Value Date    WBC 7.01 06/27/2024    RBC 4.54 06/27/2024    HGB 14.5 06/27/2024    HCT 44.4 06/27/2024     06/27/2024    RDW 12.0 06/27/2024    NEUTROABS 4.01 06/27/2024    SODIUM 139 07/10/2024    K 3.8 07/10/2024     07/10/2024    CO2 27 07/10/2024    BUN 16 07/10/2024    CREATININE 0.92 07/10/2024    GLUC 87 07/10/2024    CALCIUM 9.3 07/10/2024    AST 28 07/10/2024    ALT 36 07/10/2024    ALKPHOS 66 07/10/2024    TP 7.7 07/10/2024    ALB 4.4 07/10/2024    TBILI 0.55 07/10/2024    CHOLESTEROL 159 08/09/2024    HDL 54 08/09/2024    TRIG 88 08/09/2024    LDLCALC 87 08/09/2024    NONHDLC 105 08/09/2024    [id number] 6.241 (H) 09/10/2024    FREET4 0.56 (L) 09/10/2024    SYPHILISAB Non-reactive 05/15/2024       Progress Toward Goals: progressing    Risks / Benefits of Treatment:    Risks, benefits, and possible side effects of medications explained to patient and patient verbalizes understanding and agreement for treatment.      Basilio Panda MD 09/14/24

## 2024-09-15 PROCEDURE — 99232 SBSQ HOSP IP/OBS MODERATE 35: CPT | Performed by: STUDENT IN AN ORGANIZED HEALTH CARE EDUCATION/TRAINING PROGRAM

## 2024-09-15 RX ADMIN — LEVOTHYROXINE SODIUM 37.5 MCG: 125 TABLET ORAL at 06:27

## 2024-09-15 RX ADMIN — SERTRALINE HYDROCHLORIDE 150 MG: 100 TABLET ORAL at 08:15

## 2024-09-15 RX ADMIN — FAMOTIDINE 20 MG: 20 TABLET ORAL at 17:26

## 2024-09-15 RX ADMIN — ARIPIPRAZOLE 5 MG: 5 TABLET ORAL at 08:15

## 2024-09-15 RX ADMIN — CYANOCOBALAMIN TAB 1000 MCG 1000 MCG: 1000 TAB at 08:15

## 2024-09-15 RX ADMIN — FAMOTIDINE 20 MG: 20 TABLET ORAL at 08:15

## 2024-09-15 RX ADMIN — CHOLECALCIFEROL TAB 25 MCG (1000 UNIT) 2000 UNITS: 25 TAB at 08:15

## 2024-09-15 RX ADMIN — ATORVASTATIN CALCIUM 10 MG: 10 TABLET, FILM COATED ORAL at 17:26

## 2024-09-15 NOTE — NURSING NOTE
Patient is calm and cooperative upon approach. Patient is visible on unit, but isolative to self. Patient denies SI/HI/AH/VH. Patient is compliant with meds and meals.

## 2024-09-15 NOTE — NURSING NOTE
Pt is calm, cooperative, pacing the hallways alone, isolating to self. Pt denies SI, HI, AH, VH and pain at this time. Pt is complaint with meals (eating in room) and medications. Pt states he is going to groups and doing ADLs. Pt denies any unmet needs at this time. Pt remains on continuous rounding for safety.

## 2024-09-15 NOTE — PROGRESS NOTES
"Progress Note - Behavioral Health   Franco Roberson 39 y.o. male MRN: [id number]  Unit/Bed#: [facility] 345-02 Encounter: [id number]        Assessment & Plan  MDD (major depressive disorder), recurrent severe, without psychosis (HCC)  -Continue Abilify 5mg Daily  -Continue Sertraline 150mg Daily     Rule Out Autism spectrum disorder         Plan    Patient is awaiting group home placement.  Continue current medications.  Medical management per SLIM.  Discharge Planning    ----------------------------------------      Subjective: Per nursing report patient has been doing fairly well.  He has been calm and cooperative on the unit.  He remains medication compliant.  No acute behavioral issues overnight.  Patient remains fairly withdrawn but brightens appropriately on approach and remains appropriate and cooperative with interview.  He denies any problems tolerating medications until that his mood is good.  He denies any SI, HI, or AVH.  He remains hopeful for placement soon.  He denies any questions or concerns at this time.    Behavior over the last 24 hours:  unchanged  Sleep: normal  Appetite: normal  Medication side effects: No  ROS: no complaints and all other systems are negative    Mental Status Evaluation:  Appearance:  disheveled and marginal hygiene   Behavior:  calm, pleasant, and cooperative   Speech:  soft and scant   Mood:  \"All right\"   Affect:  constricted, slightly brighter   Thought Process:  linear and goal directed   Associations: concrete associations   Thought Content:  no overt delusions   Perceptual Disturbances: denies auditory or visual hallucinations when asked, does not appear responding to internal stimuli   Risk Potential: Suicidal ideation - None  Homicidal ideation - None  Potential for aggression - Not at present   Sensorium:  oriented to person, place, and time/date   Memory:  recent and remote memory grossly intact   Consciousness:  alert and awake   Attention/Concentration: attention span and " concentration are age appropriate   Insight:  limited   Judgment: limited   Gait/Station: normal gait/station, normal balance   Motor Activity: no abnormal movements     Medications: all current active meds have been reviewed.  Current Facility-Administered Medications   Medication Dose Route Frequency Provider Last Rate    acetaminophen  650 mg Oral Q6H PRN Basilio Brown MD      acetaminophen  650 mg Oral Q4H PRN Basilio Brown MD      acetaminophen  975 mg Oral Q6H PRN Basilio Brown MD      aluminum-magnesium hydroxide-simethicone  30 mL Oral Q4H PRN Basilio Brown MD      ARIPiprazole  5 mg Oral Daily Basilio Panda MD      Artificial Tears  1 drop Both Eyes Q3H PRN Basilio Brown MD      atorvastatin  10 mg Oral Daily With Dinner WALLY Baptiste      benztropine  1 mg Intramuscular Q4H PRN Max 6/day Basilio Brown MD      benztropine  1 mg Oral Q4H PRN Max 6/day Basilio Brown MD      Cholecalciferol  2,000 Units Oral Daily WALLY Baptiste      cyanocobalamin  1,000 mcg Oral Daily WALLY Baptiste      Diclofenac Sodium  2 g Topical 4x Daily PRN WALLY Baptiste      hydrOXYzine HCL  50 mg Oral Q6H PRN Max 4/day Basilio Brown MD      Or    diphenhydrAMINE  50 mg Intramuscular Q6H PRN Basilio Brown MD      diphenhydrAMINE-zinc acetate   Topical Daily PRN Raj Guerrero MD      famotidine  20 mg Oral BID WALLY Baptiste      hydrOXYzine HCL  100 mg Oral Q6H PRN Max 4/day Basilio Brown MD      Or    LORazepam  2 mg Intramuscular Q6H PRN Basilio Brown MD      hydrOXYzine HCL  25 mg Oral Q6H PRN Max 4/day Basilio Brown MD      levothyroxine  37.5 mcg Oral Early Morning WALLY Baptiste      melatonin  6 mg Oral HS PRN WALLY Gomez      methocarbamol  500 mg Oral Q6H PRN WALLY Baptiste      OLANZapine  5 mg Oral Q4H PRN Max 3/day Basilio Garcia  MD Karyn      Or    OLANZapine  2.5 mg Intramuscular Q4H PRN Max 3/day Basilio Brown MD      OLANZapine  5 mg Oral Q3H PRN Max 3/day Basilio Brown MD      Or    OLANZapine  5 mg Intramuscular Q3H PRN Max 3/day Basilio Brown MD      OLANZapine  2.5 mg Oral Q4H PRN Max 6/day Basilio Brown MD      polyethylene glycol  17 g Oral Daily PRN Basilio Brown MD      propranolol  10 mg Oral Q8H PRN Basilio Brown MD      senna-docusate sodium  1 tablet Oral Daily PRN Basilio Brown MD      sertraline  150 mg Oral Daily WALLY Gomez      traZODone  50 mg Oral HS PRN Basilio Brown MD         Labs: I have personally reviewed all pertinent laboratory/tests results  Most Recent Labs:   Lab Results   Component Value Date    WBC 7.01 06/27/2024    RBC 4.54 06/27/2024    HGB 14.5 06/27/2024    HCT 44.4 06/27/2024     06/27/2024    RDW 12.0 06/27/2024    NEUTROABS 4.01 06/27/2024    SODIUM 139 07/10/2024    K 3.8 07/10/2024     07/10/2024    CO2 27 07/10/2024    BUN 16 07/10/2024    CREATININE 0.92 07/10/2024    GLUC 87 07/10/2024    CALCIUM 9.3 07/10/2024    AST 28 07/10/2024    ALT 36 07/10/2024    ALKPHOS 66 07/10/2024    TP 7.7 07/10/2024    ALB 4.4 07/10/2024    TBILI 0.55 07/10/2024    CHOLESTEROL 159 08/09/2024    HDL 54 08/09/2024    TRIG 88 08/09/2024    LDLCALC 87 08/09/2024    NONHDLC 105 08/09/2024    [id number] 6.241 (H) 09/10/2024    FREET4 0.56 (L) 09/10/2024    SYPHILISAB Non-reactive 05/15/2024       Progress Toward Goals: progressing    Risks / Benefits of Treatment:    Risks, benefits, and possible side effects of medications explained to patient and patient verbalizes understanding and agreement for treatment.      Basilio Panda MD 09/15/24

## 2024-09-15 NOTE — PLAN OF CARE
Problem: Ineffective Coping  Goal: Identifies ineffective coping skills  Outcome: Progressing  Goal: Identifies healthy coping skills  Outcome: Progressing  Goal: Demonstrates healthy coping skills  Outcome: Progressing  Goal: Participates in unit activities  Description: Interventions:  - Provide therapeutic environment   - Provide required programming   - Redirect inappropriate behaviors   Outcome: Progressing     Problem: Risk for Self Injury/Neglect  Goal: Treatment Goal: Remain safe during length of stay, learn and adopt new coping skills, and be free of self-injurious ideation, impulses and acts at the time of discharge  Outcome: Progressing     Problem: Depression  Goal: Treatment Goal: Demonstrate behavioral control of depressive symptoms, verbalize feelings of improved mood/affect, and adopt new coping skills prior to discharge  Outcome: Progressing  Goal: Verbalize thoughts and feelings  Description: Interventions:  - Assess and re-assess patient's level of risk   - Engage patient in 1:1 interactions, daily, for a minimum of 15 minutes   - Encourage patient to express feelings, fears, frustrations, hopes   Outcome: Progressing  Goal: Refrain from harming self  Description: Interventions:  - Monitor patient closely, per order   - Supervise medication ingestion, monitor effects and side effects   Outcome: Progressing  Goal: Refrain from isolation  Description: Interventions:  - Develop a trusting relationship   - Encourage socialization   Outcome: Progressing  Goal: Refrain from self-neglect  Outcome: Progressing  Goal: Attend and participate in unit activities, including therapeutic, recreational, and educational groups  Description: Interventions:  - Provide therapeutic and educational activities daily, encourage attendance and participation, and document same in the medical record   Outcome: Progressing  Goal: Complete daily ADLs, including personal hygiene independently, as able  Description:  Interventions:  - Observe, teach, and assist patient with ADLS  -  Monitor and promote a balance of rest/activity, with adequate nutrition and elimination   Outcome: Progressing     Problem: Depression  Goal: Treatment Goal: Demonstrate behavioral control of depressive symptoms, verbalize feelings of improved mood/affect, and adopt new coping skills prior to discharge  Outcome: Progressing     Problem: Depression  Goal: Treatment Goal: Demonstrate behavioral control of depressive symptoms, verbalize feelings of improved mood/affect, and adopt new coping skills prior to discharge  Outcome: Progressing  Goal: Verbalize thoughts and feelings  Description: Interventions:  - Assess and re-assess patient's level of risk   - Engage patient in 1:1 interactions, daily, for a minimum of 15 minutes   - Encourage patient to express feelings, fears, frustrations, hopes   Outcome: Progressing  Goal: Refrain from harming self  Description: Interventions:  - Monitor patient closely, per order   - Supervise medication ingestion, monitor effects and side effects   Outcome: Progressing  Goal: Refrain from isolation  Description: Interventions:  - Develop a trusting relationship   - Encourage socialization   Outcome: Progressing  Goal: Refrain from self-neglect  Outcome: Progressing  Goal: Attend and participate in unit activities, including therapeutic, recreational, and educational groups  Description: Interventions:  - Provide therapeutic and educational activities daily, encourage attendance and participation, and document same in the medical record   Outcome: Progressing  Goal: Complete daily ADLs, including personal hygiene independently, as able  Description: Interventions:  - Observe, teach, and assist patient with ADLS  -  Monitor and promote a balance of rest/activity, with adequate nutrition and elimination   Outcome: Progressing     Problem: Anxiety  Goal: Anxiety is at manageable level  Description: Interventions:  -  Assess and monitor patient's anxiety level.   - Monitor for signs and symptoms (heart palpitations, chest pain, shortness of breath, headaches, nausea, feeling jumpy, restlessness, irritable, apprehensive).   - Collaborate with interdisciplinary team and initiate plan and interventions as ordered.  - [place] patient to unit/surroundings  - Explain treatment plan  - Encourage participation in care  - Encourage verbalization of concerns/fears  - Identify coping mechanisms  - Assist in developing anxiety-reducing skills  - Administer/offer alternative therapies  - Limit or eliminate stimulants  Outcome: Progressing     Problem: DISCHARGE PLANNING - CARE MANAGEMENT  Goal: Discharge to post-acute care or home with appropriate resources  Description: INTERVENTIONS:  - Conduct assessment to determine patient/family and health care team treatment goals, and need for post-acute services based on payer coverage, community resources, and patient preferences, and barriers to discharge  - Address psychosocial, clinical, and financial barriers to discharge as identified in assessment in conjunction with the patient/family and health care team  - Arrange appropriate level of post-acute services according to patient’s   needs and preference and payer coverage in collaboration with the physician and health care team  - Communicate with and update the patient/family, physician, and health care team regarding progress on the discharge plan  - Arrange appropriate transportation to post-acute venues  Outcome: Progressing     Problem: Knowledge Deficit  Goal: Patient/family/caregiver demonstrates understanding of disease process, treatment plan, medications, and discharge instructions  Description: Complete learning assessment and assess knowledge base.  Interventions:  - Provide teaching at level of understanding  - Provide teaching via preferred learning methods  Outcome: Progressing     Problem: SLEEP DISTURBANCE  Goal: Will exhibit  normal sleeping pattern  Description: Interventions:  -  Assess the patients sleep pattern, noting recent changes  - Administer medication as ordered  - Decrease environmental stimuli, including noise, as appropriate during the night  - Encourage the patient to actively participate in unit groups and or exercise during the day to enhance ability to achieve adequate sleep at night  - Assess the patient, in the morning, encouraging a description of sleep experience  Outcome: Progressing

## 2024-09-16 PROCEDURE — 99232 SBSQ HOSP IP/OBS MODERATE 35: CPT | Performed by: PSYCHIATRY & NEUROLOGY

## 2024-09-16 RX ADMIN — LEVOTHYROXINE SODIUM 37.5 MCG: 125 TABLET ORAL at 06:46

## 2024-09-16 RX ADMIN — ATORVASTATIN CALCIUM 10 MG: 10 TABLET, FILM COATED ORAL at 17:09

## 2024-09-16 RX ADMIN — SERTRALINE HYDROCHLORIDE 150 MG: 100 TABLET ORAL at 08:21

## 2024-09-16 RX ADMIN — CYANOCOBALAMIN TAB 1000 MCG 1000 MCG: 1000 TAB at 08:21

## 2024-09-16 RX ADMIN — FAMOTIDINE 20 MG: 20 TABLET ORAL at 17:09

## 2024-09-16 RX ADMIN — FAMOTIDINE 20 MG: 20 TABLET ORAL at 08:21

## 2024-09-16 RX ADMIN — ARIPIPRAZOLE 5 MG: 5 TABLET ORAL at 08:21

## 2024-09-16 RX ADMIN — CHOLECALCIFEROL TAB 25 MCG (1000 UNIT) 2000 UNITS: 25 TAB at 08:21

## 2024-09-16 NOTE — NURSING NOTE
Pt is quiet and withdrawn to room this evening. Pt cooperative with staff. Pt denies all psych symptoms. No unmet needs reported. Will continue to monitor.

## 2024-09-16 NOTE — PROGRESS NOTES
09/16/24 0841   Team Meeting   Meeting Type Daily Rounds   Team Members Present   Team Members Present ;Physician;Nurse   Physician Team Member Nish   [facility]   Care Management Team Member Noa   Patient/Family Present   Patient Present No   Patient's Family Present No     Pt denies SI/HI/AVH. Pt is medication and meal compliant. Pt is observed being social with select peers and staff. Pt's discharge is pending and is waiting placement.

## 2024-09-16 NOTE — NURSING NOTE
Pt appears sad, but very pleasant upon approach. Visible at times in the milieu, does not interact with peers. Pt is medication and meal compliant. Denies SI, HI, AH, and VH. Denies any needs at this time.

## 2024-09-16 NOTE — PROGRESS NOTES
"Progress Note - Behavioral Health   Name: Franco Roberson 39 y.o. male I MRN: [id number]  Unit/Bed#: -02 I Date of Admission: 5/14/2024   Date of Service: 9/16/2024 I Hospital Day: 125     Assessment & Plan  MDD (major depressive disorder), recurrent severe, without psychosis (HCC)  -Continue Abilify 5mg Daily  -Continue Sertraline 150mg Daily     Rule Out Autism spectrum disorder        Case discussed with treatment team.  Risks, benefits and possible side effects of Medications: Risks, benefits, and possible side effects of medications have been explained to the patient, who verbalizes understanding.      ------------------------------------------------------------  Chief Complaint:  \"I am feeling like my natural self\"    Subjective:  The patient was evaluated this morning for continuity of care and no acute distress noted throughout the evaluation.  Over the past 24 hours staff noted the patient was quiet, withdrawn some room.      Today on exam the patient was seated calmly and reports that his mood is \"very good.  \"He states that he is feeling more like his natural self.  He does report that he continues to struggle with social interactions and feels that if he were to be discharged he would fail on his own.  He is medication compliant and currently denies self-harm and SI.    Psychiatric Review of Systems:  Behavior over the last 24 hours:  unchanged  Sleep: normal  Appetite: normal  Medication side effects: No   ROS: no complaints      Current Medications:  Current Facility-Administered Medications   Medication Dose Route Frequency Provider Last Rate    acetaminophen  650 mg Oral Q6H PRN Basilio Brown MD      acetaminophen  650 mg Oral Q4H PRN Basilio Brown MD      acetaminophen  975 mg Oral Q6H PRN Basilio Brown MD      aluminum-magnesium hydroxide-simethicone  30 mL Oral Q4H PRN Basilio Brown MD      ARIPiprazole  5 mg Oral Daily Basilio Panda MD      Artificial Tears  1 " drop Both Eyes Q3H PRN Basilio Brown MD      atorvastatin  10 mg Oral Daily With Dinner WALLY Baptiste      benztropine  1 mg Intramuscular Q4H PRN Max 6/day Basilio Brown MD      benztropine  1 mg Oral Q4H PRN Max 6/day Basilio Brown MD      Cholecalciferol  2,000 Units Oral Daily Laura WALLY Olmos      cyanocobalamin  1,000 mcg Oral Daily LauraWALLY Tony      Diclofenac Sodium  2 g Topical 4x Daily PRN WALLY Baptiste      hydrOXYzine HCL  50 mg Oral Q6H PRN Max 4/day Basilio Brown MD      Or    diphenhydrAMINE  50 mg Intramuscular Q6H PRN Basilio Brown MD      diphenhydrAMINE-zinc acetate   Topical Daily PRN Raj Guerrero MD      famotidine  20 mg Oral BID LauraWALLY Tony      hydrOXYzine HCL  100 mg Oral Q6H PRN Max 4/day Basilio Brown MD      Or    LORazepam  2 mg Intramuscular Q6H PRN Basilio Brown MD      hydrOXYzine HCL  25 mg Oral Q6H PRN Max 4/day Basilio Brown MD      levothyroxine  37.5 mcg Oral Early Morning Laura Ashford, ARJUNNP      melatonin  6 mg Oral HS PRN WALLY Gomez      methocarbamol  500 mg Oral Q6H PRN WALLY Baptiste      OLANZapine  5 mg Oral Q4H PRN Max 3/day Basilio Brown MD      Or    OLANZapine  2.5 mg Intramuscular Q4H PRN Max 3/day Basiilo Brown MD      OLANZapine  5 mg Oral Q3H PRN Max 3/day Basilio Brown MD      Or    OLANZapine  5 mg Intramuscular Q3H PRN Max 3/day Basilio Brown MD      OLANZapine  2.5 mg Oral Q4H PRN Max 6/day Basilio Brown MD      polyethylene glycol  17 g Oral Daily PRN Basilio Brown MD      propranolol  10 mg Oral Q8H PRN Basilio Brown MD      senna-docusate sodium  1 tablet Oral Daily PRN Basilio Brown MD      sertraline  150 mg Oral Daily WALLY Gomez      traZODone  50 mg Oral HS PRN Basilio Brown MD         Behavioral Health Medications: all  current active meds have been reviewed and current meds:   Current Facility-Administered Medications:     acetaminophen (TYLENOL) tablet 650 mg, Q6H PRN    acetaminophen (TYLENOL) tablet 650 mg, Q4H PRN    acetaminophen (TYLENOL) tablet 975 mg, Q6H PRN    aluminum-magnesium hydroxide-simethicone (MAALOX) oral suspension 30 mL, Q4H PRN    ARIPiprazole (ABILIFY) tablet 5 mg, Daily    Artificial Tears ophthalmic solution 1 drop, Q3H PRN    atorvastatin (LIPITOR) tablet 10 mg, Daily With Dinner    benztropine (COGENTIN) injection 1 mg, Q4H PRN Max 6/day    benztropine (COGENTIN) tablet 1 mg, Q4H PRN Max 6/day    Cholecalciferol (VITAMIN D3) tablet 2,000 Units, Daily    cyanocobalamin (VITAMIN B-12) tablet 1,000 mcg, Daily    Diclofenac Sodium (VOLTAREN) 1 % topical gel 2 g, 4x Daily PRN    hydrOXYzine HCL (ATARAX) tablet 50 mg, Q6H PRN Max 4/day **OR** diphenhydrAMINE (BENADRYL) injection 50 mg, Q6H PRN    diphenhydrAMINE-zinc acetate (BENADRYL) 2-0.1 % cream, Daily PRN    famotidine (PEPCID) tablet 20 mg, BID    hydrOXYzine HCL (ATARAX) tablet 100 mg, Q6H PRN Max 4/day **OR** LORazepam (ATIVAN) injection 2 mg, Q6H PRN    hydrOXYzine HCL (ATARAX) tablet 25 mg, Q6H PRN Max 4/day    levothyroxine tablet 37.5 mcg, Early Morning    melatonin tablet 6 mg, HS PRN    methocarbamol (ROBAXIN) tablet 500 mg, Q6H PRN    OLANZapine (ZyPREXA) tablet 5 mg, Q4H PRN Max 3/day **OR** OLANZapine (ZyPREXA) IM injection 2.5 mg, Q4H PRN Max 3/day    OLANZapine (ZyPREXA) tablet 5 mg, Q3H PRN Max 3/day **OR** OLANZapine (ZyPREXA) IM injection 5 mg, Q3H PRN Max 3/day    OLANZapine (ZyPREXA) tablet 2.5 mg, Q4H PRN Max 6/day    polyethylene glycol (MIRALAX) packet 17 g, Daily PRN    propranolol (INDERAL) tablet 10 mg, Q8H PRN    senna-docusate sodium (SENOKOT S) 8.6-50 mg per tablet 1 tablet, Daily PRN    sertraline (ZOLOFT) tablet 150 mg, Daily    traZODone (DESYREL) tablet 50 mg, HS PRN.    Vital signs in last 24 hours:  Temp:  [97.1 °F (36.2  "°C)-98.3 °F (36.8 °C)] 98.3 °F (36.8 °C)  HR:  [63-66] 63  Resp:  [16] 16  BP: (118-143)/(76) 143/76    Laboratory results:  I have personally reviewed all pertinent laboratory/tests results.  Most Recent Labs:   Lab Results   Component Value Date    WBC 7.01 06/27/2024    RBC 4.54 06/27/2024    HGB 14.5 06/27/2024    HCT 44.4 06/27/2024     06/27/2024    RDW 12.0 06/27/2024    NEUTROABS 4.01 06/27/2024    SODIUM 139 07/10/2024    K 3.8 07/10/2024     07/10/2024    CO2 27 07/10/2024    BUN 16 07/10/2024    CREATININE 0.92 07/10/2024    GLUC 87 07/10/2024    GLUF 87 07/10/2024    CALCIUM 9.3 07/10/2024    AST 28 07/10/2024    ALT 36 07/10/2024    ALKPHOS 66 07/10/2024    TP 7.7 07/10/2024    ALB 4.4 07/10/2024    TBILI 0.55 07/10/2024    CHOLESTEROL 159 08/09/2024    HDL 54 08/09/2024    TRIG 88 08/09/2024    LDLCALC 87 08/09/2024    NONHDLC 105 08/09/2024    EEJ2UITFWIKV 6.241 (H) 09/10/2024    FREET4 0.56 (L) 09/10/2024       Mental Status Evaluation:    Appearance:  marginal hygiene   Behavior:  pleasant, cooperative, limited eye contact   Speech:  soft   Mood:  \"Very good \"   Affect:  constricted   Thought Process:  logical, goal directed   Associations: concrete associations   Thought Content:  no overt delusions   Perceptual Disturbances: no auditory hallucinations, no visual hallucinations, does not appear responding to internal stimuli   Risk Potential: Suicidal ideation - None at present  Homicidal ideation - None at present  Potential for aggression - No   Sensorium:  oriented to person, place, and time/date   Memory:  recent and remote memory grossly intact   Consciousness:  alert and awake   Attention/Concentration: attention span and concentration appear shorter than expected for age   Insight:  limited   Judgment: limited   Gait/Station: normal gait/station   Motor Activity: no abnormal movements             Recommended Treatment:   See above for assessment and plan.     Risks, benefits and " possible side effects of Medications:   Risks, benefits, and possible side effects of medications explained to patient and patient verbalizes understanding.      This note has been constructed using a voice recognition system.  There may be translation, syntax, or grammatical errors. If you have any questions, please contact the dictating provider.    Lita Knapp D.O.

## 2024-09-17 PROCEDURE — 99232 SBSQ HOSP IP/OBS MODERATE 35: CPT | Performed by: STUDENT IN AN ORGANIZED HEALTH CARE EDUCATION/TRAINING PROGRAM

## 2024-09-17 RX ADMIN — FAMOTIDINE 20 MG: 20 TABLET ORAL at 17:13

## 2024-09-17 RX ADMIN — ARIPIPRAZOLE 5 MG: 5 TABLET ORAL at 08:30

## 2024-09-17 RX ADMIN — CHOLECALCIFEROL TAB 25 MCG (1000 UNIT) 2000 UNITS: 25 TAB at 08:30

## 2024-09-17 RX ADMIN — SERTRALINE HYDROCHLORIDE 150 MG: 100 TABLET ORAL at 08:30

## 2024-09-17 RX ADMIN — ATORVASTATIN CALCIUM 10 MG: 10 TABLET, FILM COATED ORAL at 17:13

## 2024-09-17 RX ADMIN — LEVOTHYROXINE SODIUM 37.5 MCG: 125 TABLET ORAL at 06:55

## 2024-09-17 RX ADMIN — CYANOCOBALAMIN TAB 1000 MCG 1000 MCG: 1000 TAB at 08:29

## 2024-09-17 RX ADMIN — FAMOTIDINE 20 MG: 20 TABLET ORAL at 08:30

## 2024-09-17 NOTE — PROGRESS NOTES
09/17/24 0835   Team Meeting   Meeting Type Daily Rounds   Team Members Present   Team Members Present Physician;Nurse;   Physician Team Member Aria   [facility]   Care Management Team Member Noa   Patient/Family Present   Patient Present No   Patient's Family Present No     Pt's medication and meal compliant. Pt is pleasant and cooperative. Pt's waiting placement to [facility] home.

## 2024-09-17 NOTE — NURSING NOTE
"Pt mainly isolative/withdrawn to room. Seen walking in [place] early this late evening shift and denies symptoms, needs and concerns but appeared sad and is disheveled. Pt about unit for needs, does not have HS meds and does not request PRNs. Calm and compliant with care. Safety checks ongoing.    RN able to have conversation with pt later in evening and he states he is doing okay and is aware that there are 2 group homes being looked at currently as well as social security income. Pt states he remains hopeful despite \"celebrating\" being here 4 months the other day. Pt mentioned being thankful that Luna brought him a dictionary and declined other needs from outside when questioned.  "

## 2024-09-17 NOTE — PLAN OF CARE
Problem: Ineffective Coping  Goal: Identifies ineffective coping skills  Outcome: Progressing  Goal: Identifies healthy coping skills  Outcome: Progressing     Problem: Depression  Goal: Verbalize thoughts and feelings  Description: Interventions:  - Assess and re-assess patient's level of risk   - Engage patient in 1:1 interactions, daily, for a minimum of 15 minutes   - Encourage patient to express feelings, fears, frustrations, hopes   Outcome: Progressing  Goal: Refrain from harming self  Description: Interventions:  - Monitor patient closely, per order   - Supervise medication ingestion, monitor effects and side effects   Outcome: Progressing  Goal: Attend and participate in unit activities, including therapeutic, recreational, and educational groups  Description: Interventions:  - Provide therapeutic and educational activities daily, encourage attendance and participation, and document same in the medical record   Outcome: Progressing     Problem: Anxiety  Goal: Anxiety is at manageable level  Description: Interventions:  - Assess and monitor patient's anxiety level.   - Monitor for signs and symptoms (heart palpitations, chest pain, shortness of breath, headaches, nausea, feeling jumpy, restlessness, irritable, apprehensive).   - Collaborate with interdisciplinary team and initiate plan and interventions as ordered.  - [place] patient to unit/surroundings  - Explain treatment plan  - Encourage participation in care  - Encourage verbalization of concerns/fears  - Identify coping mechanisms  - Assist in developing anxiety-reducing skills  - Administer/offer alternative therapies  - Limit or eliminate stimulants  Outcome: Progressing

## 2024-09-17 NOTE — PROGRESS NOTES
Progress Note - Behavioral Health   Name: Franco Roberson 39 y.o. male I MRN: [id number]  Unit/Bed#: -02 I Date of Admission: 5/14/2024   Date of Service: 9/17/2024 I Hospital Day: 126     Assessment & Plan  MDD (major depressive disorder), recurrent severe, without psychosis (HCC)  -Continue Abilify 5mg Daily  -Continue Sertraline 150mg Daily     Rule Out Autism spectrum disorder      Progress Toward Goals: Improved    Recommended Treatment: Continue with group therapy, milieu therapy and occupational therapy.      Risks, benefits and possible side effects of Medications:   Risks, benefits, and possible side effects of medications explained to patient and patient verbalizes understanding.      History of Present Illness   Behavior over the last 24 hours:  improved  Sleep: normal  Appetite: normal  Medication side effects: No  ROS: no complaints    Subjective: Patient stating he is doing good. States his roommate was leaving. Stated his appetite was good and his sleep was good. Endorsed staying up due to inspiration to write a poem. States he wrote it in his journal and has a few of them. Endorsed sharing the poem with one of the group counselors.     Objective   Mental Status Evaluation:  Appearance:  age appropriate   Behavior:  normal   Speech:  normal volume   Mood:  euthymic   Affect:  mood-congruent   Thought Process:  normal   Associations: intact associations   Thought Content:  normal   Perceptual Disturbances: None   Risk Potential: Suicidal Ideations none  Homicidal Ideations none  Potential for Aggression No   Sensorium:  person, place, time/date, and situation   Memory:  recent and remote memory grossly intact   Consciousness:  alert and awake    Attention: attention span and concentration were age appropriate   Insight:  limited   Judgment: limited   Gait/Station: normal gait/station   Motor Activity: no abnormal movements     Medications: current meds:   Current Facility-Administered Medications:      acetaminophen (TYLENOL) tablet 650 mg, Q6H PRN    acetaminophen (TYLENOL) tablet 650 mg, Q4H PRN    acetaminophen (TYLENOL) tablet 975 mg, Q6H PRN    aluminum-magnesium hydroxide-simethicone (MAALOX) oral suspension 30 mL, Q4H PRN    ARIPiprazole (ABILIFY) tablet 5 mg, Daily    Artificial Tears ophthalmic solution 1 drop, Q3H PRN    atorvastatin (LIPITOR) tablet 10 mg, Daily With Dinner    benztropine (COGENTIN) injection 1 mg, Q4H PRN Max 6/day    benztropine (COGENTIN) tablet 1 mg, Q4H PRN Max 6/day    Cholecalciferol (VITAMIN D3) tablet 2,000 Units, Daily    cyanocobalamin (VITAMIN B-12) tablet 1,000 mcg, Daily    Diclofenac Sodium (VOLTAREN) 1 % topical gel 2 g, 4x Daily PRN    hydrOXYzine HCL (ATARAX) tablet 50 mg, Q6H PRN Max 4/day **OR** diphenhydrAMINE (BENADRYL) injection 50 mg, Q6H PRN    diphenhydrAMINE-zinc acetate (BENADRYL) 2-0.1 % cream, Daily PRN    famotidine (PEPCID) tablet 20 mg, BID    hydrOXYzine HCL (ATARAX) tablet 100 mg, Q6H PRN Max 4/day **OR** LORazepam (ATIVAN) injection 2 mg, Q6H PRN    hydrOXYzine HCL (ATARAX) tablet 25 mg, Q6H PRN Max 4/day    levothyroxine tablet 37.5 mcg, Early Morning    melatonin tablet 6 mg, HS PRN    methocarbamol (ROBAXIN) tablet 500 mg, Q6H PRN    OLANZapine (ZyPREXA) tablet 5 mg, Q4H PRN Max 3/day **OR** OLANZapine (ZyPREXA) IM injection 2.5 mg, Q4H PRN Max 3/day    OLANZapine (ZyPREXA) tablet 5 mg, Q3H PRN Max 3/day **OR** OLANZapine (ZyPREXA) IM injection 5 mg, Q3H PRN Max 3/day    OLANZapine (ZyPREXA) tablet 2.5 mg, Q4H PRN Max 6/day    polyethylene glycol (MIRALAX) packet 17 g, Daily PRN    propranolol (INDERAL) tablet 10 mg, Q8H PRN    senna-docusate sodium (SENOKOT S) 8.6-50 mg per tablet 1 tablet, Daily PRN    sertraline (ZOLOFT) tablet 150 mg, Daily    traZODone (DESYREL) tablet 50 mg, HS PRN.      Lab Results: I have reviewed the following results: CBC/BMP: No new results in last 24 hours.   Most Recent Labs:   Lab Results   Component Value Date     WBC 7.01 06/27/2024    RBC 4.54 06/27/2024    HGB 14.5 06/27/2024    HCT 44.4 06/27/2024     06/27/2024    RDW 12.0 06/27/2024    NEUTROABS 4.01 06/27/2024    SODIUM 139 07/10/2024    K 3.8 07/10/2024     07/10/2024    CO2 27 07/10/2024    BUN 16 07/10/2024    CREATININE 0.92 07/10/2024    GLUC 87 07/10/2024    GLUF 87 07/10/2024    CALCIUM 9.3 07/10/2024    AST 28 07/10/2024    ALT 36 07/10/2024    ALKPHOS 66 07/10/2024    TP 7.7 07/10/2024    ALB 4.4 07/10/2024    TBILI 0.55 07/10/2024    CHOLESTEROL 159 08/09/2024    HDL 54 08/09/2024    TRIG 88 08/09/2024    LDLCALC 87 08/09/2024    NONHDLC 105 08/09/2024    [id number] 6.241 (H) 09/10/2024    FREET4 0.56 (L) 09/10/2024       Administrative Statements   I have spent a total time of 15-20 minutes in caring for this patient on the day of the visit/encounter including Risks and benefits of tx options, Instructions for management, Patient and family education, Importance of tx compliance, Risk factor reductions, Impressions, Counseling / Coordination of care, Documenting in the medical record, and Communicating with other healthcare professionals .

## 2024-09-17 NOTE — NURSING NOTE
Pt denies SI/HI/AH/VH. Present in dayroom and milieu but is mostly isolative to his room. Medication and meal compliant. Limited conversation with select peers. Compliant with Lunch. No further concerns as of present. Plan of care ongoing.

## 2024-09-18 PROCEDURE — 99232 SBSQ HOSP IP/OBS MODERATE 35: CPT | Performed by: PSYCHIATRY & NEUROLOGY

## 2024-09-18 RX ADMIN — LEVOTHYROXINE SODIUM 37.5 MCG: 125 TABLET ORAL at 06:46

## 2024-09-18 RX ADMIN — CYANOCOBALAMIN TAB 1000 MCG 1000 MCG: 1000 TAB at 08:11

## 2024-09-18 RX ADMIN — ARIPIPRAZOLE 5 MG: 5 TABLET ORAL at 08:11

## 2024-09-18 RX ADMIN — FAMOTIDINE 20 MG: 20 TABLET ORAL at 08:11

## 2024-09-18 RX ADMIN — ATORVASTATIN CALCIUM 10 MG: 10 TABLET, FILM COATED ORAL at 17:22

## 2024-09-18 RX ADMIN — CHOLECALCIFEROL TAB 25 MCG (1000 UNIT) 2000 UNITS: 25 TAB at 08:11

## 2024-09-18 RX ADMIN — SERTRALINE HYDROCHLORIDE 150 MG: 100 TABLET ORAL at 08:11

## 2024-09-18 RX ADMIN — FAMOTIDINE 20 MG: 20 TABLET ORAL at 17:22

## 2024-09-18 NOTE — PLAN OF CARE
Problem: Risk for Self Injury/Neglect  Goal: Treatment Goal: Remain safe during length of stay, learn and adopt new coping skills, and be free of self-injurious ideation, impulses and acts at the time of discharge  Outcome: Progressing     Problem: Depression  Goal: Verbalize thoughts and feelings  Description: Interventions:  - Assess and re-assess patient's level of risk   - Engage patient in 1:1 interactions, daily, for a minimum of 15 minutes   - Encourage patient to express feelings, fears, frustrations, hopes   Outcome: Progressing  Goal: Refrain from harming self  Description: Interventions:  - Monitor patient closely, per order   - Supervise medication ingestion, monitor effects and side effects   Outcome: Progressing  Goal: Attend and participate in unit activities, including therapeutic, recreational, and educational groups  Description: Interventions:  - Provide therapeutic and educational activities daily, encourage attendance and participation, and document same in the medical record   Outcome: Progressing     Problem: Anxiety  Goal: Anxiety is at manageable level  Description: Interventions:  - Assess and monitor patient's anxiety level.   - Monitor for signs and symptoms (heart palpitations, chest pain, shortness of breath, headaches, nausea, feeling jumpy, restlessness, irritable, apprehensive).   - Collaborate with interdisciplinary team and initiate plan and interventions as ordered.  - [place] patient to unit/surroundings  - Explain treatment plan  - Encourage participation in care  - Encourage verbalization of concerns/fears  - Identify coping mechanisms  - Assist in developing anxiety-reducing skills  - Administer/offer alternative therapies  - Limit or eliminate stimulants  Outcome: Progressing     Problem: DISCHARGE PLANNING - CARE MANAGEMENT  Goal: Discharge to post-acute care or home with appropriate resources  Description: INTERVENTIONS:  - Conduct assessment to determine patient/family  and health care team treatment goals, and need for post-acute services based on payer coverage, community resources, and patient preferences, and barriers to discharge  - Address psychosocial, clinical, and financial barriers to discharge as identified in assessment in conjunction with the patient/family and health care team  - Arrange appropriate level of post-acute services according to patient’s   needs and preference and payer coverage in collaboration with the physician and health care team  - Communicate with and update the patient/family, physician, and health care team regarding progress on the discharge plan  - Arrange appropriate transportation to post-acute venues  Outcome: Progressing     Problem: Knowledge Deficit  Goal: Patient/family/caregiver demonstrates understanding of disease process, treatment plan, medications, and discharge instructions  Description: Complete learning assessment and assess knowledge base.  Interventions:  - Provide teaching at level of understanding  - Provide teaching via preferred learning methods  Outcome: Progressing     Problem: SLEEP DISTURBANCE  Goal: Will exhibit normal sleeping pattern  Description: Interventions:  -  Assess the patients sleep pattern, noting recent changes  - Administer medication as ordered  - Decrease environmental stimuli, including noise, as appropriate during the night  - Encourage the patient to actively participate in unit groups and or exercise during the day to enhance ability to achieve adequate sleep at night  - Assess the patient, in the morning, encouraging a description of sleep experience  Outcome: Progressing     Problem: Ineffective Coping  Goal: Identifies ineffective coping skills  Outcome: Not Progressing  Goal: Identifies healthy coping skills  Outcome: Not Progressing  Goal: Demonstrates healthy coping skills  Outcome: Not Progressing  Goal: Participates in unit activities  Description: Interventions:  - Provide therapeutic  environment   - Provide required programming   - Redirect inappropriate behaviors   Outcome: Not Progressing     Problem: Depression  Goal: Treatment Goal: Demonstrate behavioral control of depressive symptoms, verbalize feelings of improved mood/affect, and adopt new coping skills prior to discharge  Outcome: Not Progressing  Goal: Refrain from isolation  Description: Interventions:  - Develop a trusting relationship   - Encourage socialization   Outcome: Not Progressing  Goal: Refrain from self-neglect  Outcome: Not Progressing  Goal: Complete daily ADLs, including personal hygiene independently, as able  Description: Interventions:  - Observe, teach, and assist patient with ADLS  -  Monitor and promote a balance of rest/activity, with adequate nutrition and elimination   Outcome: Not Progressing

## 2024-09-18 NOTE — NURSING NOTE
Pt mainly isolative to room this late evening. Quiet and to self, withdrawn. Denies SI/HI/AVH. Denies needs and concerns. Out of room for needs and occasionally walks halls. Compliant with unit routines and care. ADLs remain poor. Safety checks ongoing.

## 2024-09-18 NOTE — PROGRESS NOTES
"Progress Note - Behavioral Health   Name: Franco Roberson 39 y.o. male I MRN: [id number]  Unit/Bed#: -02 I Date of Admission: 5/14/2024   Date of Service: 9/18/2024 I Hospital Day: 127     Assessment & Plan  MDD (major depressive disorder), recurrent severe, without psychosis (HCC)  -Continue Abilify 5mg Daily  -Continue Sertraline 150mg Daily     Autism spectrum disorder      Progress Toward Goals: Franco is currently assessed as being at their baseline with continued need for medication management, supervision for safety and ADL’s. These services are not currently available in a less restrictive environment necessitating continued hospital stay.  Franco should remain on the unit until these services are available, due to likelihood of mental decompensation and readmission if discharged to an unsupervised setting.  Assertive discharge planning and collaboration with multiple providers (inpatient and community based) remains ongoing.  Franco is currently awaiting for a Care Home.      Recommended Treatment: Continue with group therapy, milieu therapy and occupational therapy.      Risks, benefits and possible side effects of Medications:   Risks, benefits, and possible side effects of medications explained to patient and patient verbalizes understanding.      History of Present Illness   Behavior over the last 24 hours:  improved  Sleep: normal  Appetite: normal  Medication side effects: No  ROS: no complaints    Subjective: On evaluation today Franco was lying in bed and reports that his depression is a 2 out of 10 and his anxiety is a 2 out of 10.  Currently he feels that medications have been helpful and he has been informed regarding the delay on placement.  At this time he denies any SI or self-harm.    Objective   Mental Status Evaluation:  Appearance:  age appropriate   Behavior:  Cooperative   Speech:  soft and scant   Mood:  \"Good\"   Affect:  constricted   Thought Process:  logical   Associations: " concrete associations   Thought Content:  No overt delusions   Perceptual Disturbances: None   Risk Potential: Suicidal Ideations none  Homicidal Ideations none  Potential for Aggression No   Sensorium:  person, place, and time/date   Memory:  recent and remote memory grossly intact   Consciousness:  alert    Attention: attention span and concentration were age appropriate   Insight:  limited   Judgment: limited   Gait/Station: normal gait/station   Motor Activity: no abnormal movements     Medications: all current active meds have been reviewed and current meds:   Current Facility-Administered Medications:     acetaminophen (TYLENOL) tablet 650 mg, Q6H PRN    acetaminophen (TYLENOL) tablet 650 mg, Q4H PRN    acetaminophen (TYLENOL) tablet 975 mg, Q6H PRN    aluminum-magnesium hydroxide-simethicone (MAALOX) oral suspension 30 mL, Q4H PRN    ARIPiprazole (ABILIFY) tablet 5 mg, Daily    Artificial Tears ophthalmic solution 1 drop, Q3H PRN    atorvastatin (LIPITOR) tablet 10 mg, Daily With Dinner    benztropine (COGENTIN) injection 1 mg, Q4H PRN Max 6/day    benztropine (COGENTIN) tablet 1 mg, Q4H PRN Max 6/day    Cholecalciferol (VITAMIN D3) tablet 2,000 Units, Daily    cyanocobalamin (VITAMIN B-12) tablet 1,000 mcg, Daily    Diclofenac Sodium (VOLTAREN) 1 % topical gel 2 g, 4x Daily PRN    hydrOXYzine HCL (ATARAX) tablet 50 mg, Q6H PRN Max 4/day **OR** diphenhydrAMINE (BENADRYL) injection 50 mg, Q6H PRN    diphenhydrAMINE-zinc acetate (BENADRYL) 2-0.1 % cream, Daily PRN    famotidine (PEPCID) tablet 20 mg, BID    hydrOXYzine HCL (ATARAX) tablet 100 mg, Q6H PRN Max 4/day **OR** LORazepam (ATIVAN) injection 2 mg, Q6H PRN    hydrOXYzine HCL (ATARAX) tablet 25 mg, Q6H PRN Max 4/day    levothyroxine tablet 37.5 mcg, Early Morning    melatonin tablet 6 mg, HS PRN    methocarbamol (ROBAXIN) tablet 500 mg, Q6H PRN    OLANZapine (ZyPREXA) tablet 5 mg, Q4H PRN Max 3/day **OR** OLANZapine (ZyPREXA) IM injection 2.5 mg, Q4H PRN  Max 3/day    OLANZapine (ZyPREXA) tablet 5 mg, Q3H PRN Max 3/day **OR** OLANZapine (ZyPREXA) IM injection 5 mg, Q3H PRN Max 3/day    OLANZapine (ZyPREXA) tablet 2.5 mg, Q4H PRN Max 6/day    polyethylene glycol (MIRALAX) packet 17 g, Daily PRN    propranolol (INDERAL) tablet 10 mg, Q8H PRN    senna-docusate sodium (SENOKOT S) 8.6-50 mg per tablet 1 tablet, Daily PRN    sertraline (ZOLOFT) tablet 150 mg, Daily    traZODone (DESYREL) tablet 50 mg, HS PRN.      Lab Results: I have reviewed the following results: none  Admission Labs:   Admission on 05/14/2024   Component Date Value    Sodium 05/15/2024 142     Potassium 05/15/2024 4.1     Chloride 05/15/2024 106     CO2 05/15/2024 28     ANION GAP 05/15/2024 8     BUN 05/15/2024 13     Creatinine 05/15/2024 0.97     Glucose 05/15/2024 89     Glucose, Fasting 05/15/2024 89     Calcium 05/15/2024 9.3     AST 05/15/2024 21     ALT 05/15/2024 19     Alkaline Phosphatase 05/15/2024 66     Total Protein 05/15/2024 7.6     Albumin 05/15/2024 4.3     Total Bilirubin 05/15/2024 0.69     eGFR 05/15/2024 97     WBC 05/15/2024 5.42     RBC 05/15/2024 5.05     Hemoglobin 05/15/2024 16.1     Hematocrit 05/15/2024 49.4 (H)     MCV 05/15/2024 98     MCH 05/15/2024 31.9     MCHC 05/15/2024 32.6     RDW 05/15/2024 12.6     MPV 05/15/2024 9.2     Platelets 05/15/2024 268     nRBC 05/15/2024 0     Segmented % 05/15/2024 53     Immature Grans % 05/15/2024 0     Lymphocytes % 05/15/2024 38     Monocytes % 05/15/2024 7     Eosinophils Relative 05/15/2024 2     Basophils Relative 05/15/2024 0     Absolute Neutrophils 05/15/2024 2.83     Absolute Immature Grans 05/15/2024 0.01     Absolute Lymphocytes 05/15/2024 2.06     Absolute Monocytes 05/15/2024 0.40     Eosinophils Absolute 05/15/2024 0.10     Basophils Absolute 05/15/2024 0.02     TSH 3RD GENERATON 05/15/2024 5.162 (H)     Vitamin B-12 05/15/2024 168 (L)     Folate 05/15/2024 >22.3     Vit D, 25-Hydroxy 05/15/2024 11.6 (L)      Cholesterol 05/15/2024 191     Triglycerides 05/15/2024 116     HDL, Direct 05/15/2024 44     LDL Calculated 05/15/2024 124 (H)     Non-HDL-Chol (CHOL-HDL) 05/15/2024 147     Syphilis Total Antibody 05/15/2024 Non-reactive     Free T4 05/15/2024 0.60 (L)     Ventricular Rate 05/15/2024 66     Atrial Rate 05/15/2024 66     VT Interval 05/15/2024 146     QRSD Interval 05/15/2024 70     QT Interval 05/15/2024 402     QTC Interval 05/15/2024 421     P Axis 05/15/2024 39     QRS Axis 05/15/2024 48     T Wave Axis 05/15/2024 41     Ventricular Rate 05/15/2024 61     Atrial Rate 05/15/2024 61     VT Interval 05/15/2024 188     QRSD Interval 05/15/2024 92     QT Interval 05/15/2024 426     QTC Interval 05/15/2024 428     P Axis 05/15/2024 56     QRS Axis 05/15/2024 50     T Wave Axis 05/15/2024 46     Color, UA 06/26/2024 Yellow     Clarity, UA 06/26/2024 Slightly Cloudy (A)     Specific [place], UA 06/26/2024 1.020     pH, UA 06/26/2024 6.0     Leukocytes, UA 06/26/2024 Negative     Nitrite, UA 06/26/2024 Negative     Protein, UA 06/26/2024 15 (Trace) (A)     Glucose, UA 06/26/2024 Negative     Ketones, UA 06/26/2024 Negative     Bilirubin, UA 06/26/2024 Negative     Occult Blood, UA 06/26/2024 Negative     UROBILINOGEN UA 06/26/2024 Negative     RBC, UA 06/26/2024 None Seen     WBC, UA 06/26/2024 None Seen     Epithelial Cells 06/26/2024 None Seen     Bacteria, UA 06/26/2024 Moderate (A)     AMORPH URATES 06/26/2024 Moderate     MUCUS THREADS 06/26/2024 Occasional (A)     Sodium 06/27/2024 138     Potassium 06/27/2024 4.5     Chloride 06/27/2024 101     CO2 06/27/2024 30     ANION GAP 06/27/2024 7     BUN 06/27/2024 21     Creatinine 06/27/2024 1.02     Glucose 06/27/2024 85     Calcium 06/27/2024 9.7     AST 06/27/2024 20     ALT 06/27/2024 22     Alkaline Phosphatase 06/27/2024 68     Total Protein 06/27/2024 7.9     Albumin 06/27/2024 4.4     Total Bilirubin 06/27/2024 0.32     eGFR 06/27/2024 92     WBC 06/27/2024  7.01     RBC 06/27/2024 4.54     Hemoglobin 06/27/2024 14.5     Hematocrit 06/27/2024 44.4     MCV 06/27/2024 98     MCH 06/27/2024 31.9     MCHC 06/27/2024 32.7     RDW 06/27/2024 12.0     MPV 06/27/2024 8.9     Platelets 06/27/2024 249     nRBC 06/27/2024 0     Segmented % 06/27/2024 58     Immature Grans % 06/27/2024 0     Lymphocytes % 06/27/2024 31     Monocytes % 06/27/2024 9     Eosinophils Relative 06/27/2024 2     Basophils Relative 06/27/2024 0     Absolute Neutrophils 06/27/2024 4.01     Absolute Immature Grans 06/27/2024 0.02     Absolute Lymphocytes 06/27/2024 2.14     Absolute Monocytes 06/27/2024 0.65     Eosinophils Absolute 06/27/2024 0.17     Basophils Absolute 06/27/2024 0.02     QFT Nil 07/03/2024 0.03     QFT TB1-NIL 07/03/2024 0.02     QFT TB2-NIL 07/03/2024 0.04     QFT Mitogen-NIL 07/03/2024 9.27     QFT Final Interpretation 07/03/2024 Negative     Cholesterol 07/05/2024 175     Triglycerides 07/05/2024 99     HDL, Direct 07/05/2024 53     LDL Calculated 07/05/2024 102 (H)     Non-HDL-Chol (CHOL-HDL) 07/05/2024 122     Clarity, UA 07/09/2024 Clear     Color, UA 07/09/2024 Straw     Specific [place], UA 07/09/2024 1.015     pH, UA 07/09/2024 6.0     Glucose, UA 07/09/2024 Negative     Ketones, UA 07/09/2024 Negative     Occult Blood, UA 07/09/2024 Negative     Protein, UA 07/09/2024 Negative     Nitrite, UA 07/09/2024 Negative     Bilirubin, UA 07/09/2024 Negative     Leukocytes, UA 07/09/2024 Negative     WBC, UA 07/09/2024 None Seen     RBC, UA 07/09/2024 None Seen     Bacteria, UA 07/09/2024 None Seen     Epithelial Cells 07/09/2024 None Seen     UROBILINOGEN UA 07/09/2024 Negative     Urine Culture 07/09/2024 No Growth <1000 cfu/mL     TSH 3RD GENERATON 07/10/2024 5.705 (H)     Sodium 07/10/2024 139     Potassium 07/10/2024 3.8     Chloride 07/10/2024 103     CO2 07/10/2024 27     ANION GAP 07/10/2024 9     BUN 07/10/2024 16     Creatinine 07/10/2024 0.92     Glucose 07/10/2024 87      Glucose, Fasting 07/10/2024 87     Calcium 07/10/2024 9.3     AST 07/10/2024 28     ALT 07/10/2024 36     Alkaline Phosphatase 07/10/2024 66     Total Protein 07/10/2024 7.7     Albumin 07/10/2024 4.4     Total Bilirubin 07/10/2024 0.55     eGFR 07/10/2024 104     Free T4 07/10/2024 0.54 (L)     Cholesterol 08/09/2024 159     Triglycerides 08/09/2024 88     HDL, Direct 08/09/2024 54     LDL Calculated 08/09/2024 87     Non-HDL-Chol (CHOL-HDL) 08/09/2024 105     TSH 3RD GENERATON 09/10/2024 6.241 (H)     Free T4 09/10/2024 0.56 (L)        Administrative Statements   I have spent a total time of 20 minutes in caring for this patient on the day of the visit/encounter including Instructions for management, Counseling / Coordination of care, Documenting in the medical record, and Obtaining or reviewing history  .

## 2024-09-18 NOTE — NURSING NOTE
Pt denies SI/HI/AH/VH. Present in dayroom and milieu. Mostly isolative to self.  Medication and meal compliant. Pt appears depressed but denies depression at this time. Minimal socialization with select peers. Compliant with Lunch. No further concerns as of present. Plan of care ongoing.

## 2024-09-18 NOTE — PROGRESS NOTES
09/18/24 0845   Team Meeting   Meeting Type Daily Rounds   Team Members Present   Team Members Present Physician;Nurse;   Physician Team Member Aria   [facility]   Care Management Team Member Noa   Patient/Family Present   Patient Present No   Patient's Family Present No     Pt is medication and meal compliant. Pt denies SI/HI/AVH. Pt is social select peers and staff. Pt's discharge is pending group home placement in [place].

## 2024-09-19 LAB
CHOLEST SERPL-MCNC: 168 MG/DL
HDLC SERPL-MCNC: 42 MG/DL
LDLC SERPL CALC-MCNC: 99 MG/DL (ref 0–100)
NONHDLC SERPL-MCNC: 126 MG/DL
TRIGL SERPL-MCNC: 137 MG/DL

## 2024-09-19 PROCEDURE — 99232 SBSQ HOSP IP/OBS MODERATE 35: CPT | Performed by: PSYCHIATRY & NEUROLOGY

## 2024-09-19 PROCEDURE — 80061 LIPID PANEL: CPT | Performed by: PSYCHIATRY & NEUROLOGY

## 2024-09-19 RX ADMIN — FAMOTIDINE 20 MG: 20 TABLET ORAL at 17:14

## 2024-09-19 RX ADMIN — ATORVASTATIN CALCIUM 10 MG: 10 TABLET, FILM COATED ORAL at 17:14

## 2024-09-19 RX ADMIN — CHOLECALCIFEROL TAB 25 MCG (1000 UNIT) 2000 UNITS: 25 TAB at 08:18

## 2024-09-19 RX ADMIN — SERTRALINE HYDROCHLORIDE 150 MG: 100 TABLET ORAL at 08:18

## 2024-09-19 RX ADMIN — CYANOCOBALAMIN TAB 1000 MCG 1000 MCG: 1000 TAB at 08:18

## 2024-09-19 RX ADMIN — LEVOTHYROXINE SODIUM 37.5 MCG: 125 TABLET ORAL at 06:34

## 2024-09-19 RX ADMIN — ARIPIPRAZOLE 5 MG: 5 TABLET ORAL at 08:18

## 2024-09-19 RX ADMIN — FAMOTIDINE 20 MG: 20 TABLET ORAL at 08:18

## 2024-09-19 NOTE — PLAN OF CARE
Problem: Ineffective Coping  Goal: Identifies ineffective coping skills  Outcome: Progressing  Goal: Identifies healthy coping skills  Outcome: Progressing  Goal: Demonstrates healthy coping skills  Outcome: Progressing  Goal: Participates in unit activities  Description: Interventions:  - Provide therapeutic environment   - Provide required programming   - Redirect inappropriate behaviors   Outcome: Progressing     Problem: Risk for Self Injury/Neglect  Goal: Treatment Goal: Remain safe during length of stay, learn and adopt new coping skills, and be free of self-injurious ideation, impulses and acts at the time of discharge  Outcome: Progressing     Problem: Depression  Goal: Treatment Goal: Demonstrate behavioral control of depressive symptoms, verbalize feelings of improved mood/affect, and adopt new coping skills prior to discharge  Outcome: Progressing  Goal: Verbalize thoughts and feelings  Description: Interventions:  - Assess and re-assess patient's level of risk   - Engage patient in 1:1 interactions, daily, for a minimum of 15 minutes   - Encourage patient to express feelings, fears, frustrations, hopes   Outcome: Progressing  Goal: Refrain from harming self  Description: Interventions:  - Monitor patient closely, per order   - Supervise medication ingestion, monitor effects and side effects   Outcome: Progressing  Goal: Refrain from self-neglect  Outcome: Progressing  Goal: Attend and participate in unit activities, including therapeutic, recreational, and educational groups  Description: Interventions:  - Provide therapeutic and educational activities daily, encourage attendance and participation, and document same in the medical record   Outcome: Progressing  Goal: Complete daily ADLs, including personal hygiene independently, as able  Description: Interventions:  - Observe, teach, and assist patient with ADLS  -  Monitor and promote a balance of rest/activity, with adequate nutrition and elimination    Outcome: Progressing     Problem: Anxiety  Goal: Anxiety is at manageable level  Description: Interventions:  - Assess and monitor patient's anxiety level.   - Monitor for signs and symptoms (heart palpitations, chest pain, shortness of breath, headaches, nausea, feeling jumpy, restlessness, irritable, apprehensive).   - Collaborate with interdisciplinary team and initiate plan and interventions as ordered.  - [place] patient to unit/surroundings  - Explain treatment plan  - Encourage participation in care  - Encourage verbalization of concerns/fears  - Identify coping mechanisms  - Assist in developing anxiety-reducing skills  - Administer/offer alternative therapies  - Limit or eliminate stimulants  Outcome: Progressing     Problem: DISCHARGE PLANNING - CARE MANAGEMENT  Goal: Discharge to post-acute care or home with appropriate resources  Description: INTERVENTIONS:  - Conduct assessment to determine patient/family and health care team treatment goals, and need for post-acute services based on payer coverage, community resources, and patient preferences, and barriers to discharge  - Address psychosocial, clinical, and financial barriers to discharge as identified in assessment in conjunction with the patient/family and health care team  - Arrange appropriate level of post-acute services according to patient’s   needs and preference and payer coverage in collaboration with the physician and health care team  - Communicate with and update the patient/family, physician, and health care team regarding progress on the discharge plan  - Arrange appropriate transportation to post-acute venues  Outcome: Progressing     Problem: Knowledge Deficit  Goal: Patient/family/caregiver demonstrates understanding of disease process, treatment plan, medications, and discharge instructions  Description: Complete learning assessment and assess knowledge base.  Interventions:  - Provide teaching at level of understanding  - Provide  teaching via preferred learning methods  Outcome: Progressing     Problem: SLEEP DISTURBANCE  Goal: Will exhibit normal sleeping pattern  Description: Interventions:  -  Assess the patients sleep pattern, noting recent changes  - Administer medication as ordered  - Decrease environmental stimuli, including noise, as appropriate during the night  - Encourage the patient to actively participate in unit groups and or exercise during the day to enhance ability to achieve adequate sleep at night  - Assess the patient, in the morning, encouraging a description of sleep experience  Outcome: Progressing     Problem: Depression  Goal: Refrain from isolation  Description: Interventions:  - Develop a trusting relationship   - Encourage socialization   Outcome: Not Progressing

## 2024-09-19 NOTE — NURSING NOTE
Pt denies SI/HI/AH/VH. Present in dayroom and milieu. Minimally social with select peers but is social with staff. Medication and meal compliant. Pt is calm and pleasant. Compliant with Lunch. No further concerns as of present. Plan of care ongoing.

## 2024-09-19 NOTE — PROGRESS NOTES
"Progress Note - Behavioral Health   Name: Franco Roberson 39 y.o. male I MRN: [id number]  Unit/Bed#: -02 I Date of Admission: 5/14/2024   Date of Service: 9/19/2024 I Hospital Day: 128     Assessment & Plan  MDD (major depressive disorder), recurrent severe, without psychosis (HCC)  -Continue Abilify 5mg Daily  -Continue Sertraline 150mg Daily     Autism spectrum disorder      Progress Toward Goals: Franco is currently assessed as being at their baseline with continued need for medication management, supervision for safety and ADL’s. These services are not currently available in a less restrictive environment necessitating continued hospital stay.  Franco should remain on the unit until these services are available, due to likelihood of mental decompensation and readmission if discharged to an unsupervised setting.  Assertive discharge planning and collaboration with multiple providers (inpatient and community based) remains ongoing.  Franco is currently awaiting for group home placement.    Recommended Treatment: Continue with group therapy, milieu therapy and occupational therapy.      Risks, benefits and possible side effects of Medications:   Risks, benefits, and possible side effects of medications explained to patient and patient verbalizes understanding.      History of Present Illness   Behavior over the last 24 hours:  unchanged  Sleep: normal  Appetite: normal  Medication side effects: No  ROS: no complaints    Subjective: On evaluation Franco was lying in bed and states that today he is feeling \"pretty good.\"  He continues to report that this hospitalization has helped him with anxiety and depression.  He still feels that medications have been helpful and denies any side effects to medications.  He has been compliant with unit routines,, meals and medications.  He is still looking forward to placement in a group home.    Objective   Mental Status Evaluation:  Appearance:  age appropriate and marginal " "hygiene, casually dressed   Behavior:  Cooperative   Speech:  soft and scant   Mood:  \" Pretty good\"   Affect:  constricted   Thought Process:  concrete   Associations: concrete associations   Thought Content:  No overt delusions   Perceptual Disturbances: None   Risk Potential: Suicidal Ideations none  Homicidal Ideations none  Potential for Aggression No   Sensorium:  person, place, and time/date   Memory:  recent and remote memory grossly intact   Consciousness:  alert and awake    Attention: attention span and concentration were age appropriate   Insight:  limited   Judgment: limited   Gait/Station: normal gait/station   Motor Activity: no abnormal movements     Medications: all current active meds have been reviewed and current meds:   Current Facility-Administered Medications:     acetaminophen (TYLENOL) tablet 650 mg, Q6H PRN    acetaminophen (TYLENOL) tablet 650 mg, Q4H PRN    acetaminophen (TYLENOL) tablet 975 mg, Q6H PRN    aluminum-magnesium hydroxide-simethicone (MAALOX) oral suspension 30 mL, Q4H PRN    ARIPiprazole (ABILIFY) tablet 5 mg, Daily    Artificial Tears ophthalmic solution 1 drop, Q3H PRN    atorvastatin (LIPITOR) tablet 10 mg, Daily With Dinner    benztropine (COGENTIN) injection 1 mg, Q4H PRN Max 6/day    benztropine (COGENTIN) tablet 1 mg, Q4H PRN Max 6/day    Cholecalciferol (VITAMIN D3) tablet 2,000 Units, Daily    cyanocobalamin (VITAMIN B-12) tablet 1,000 mcg, Daily    Diclofenac Sodium (VOLTAREN) 1 % topical gel 2 g, 4x Daily PRN    hydrOXYzine HCL (ATARAX) tablet 50 mg, Q6H PRN Max 4/day **OR** diphenhydrAMINE (BENADRYL) injection 50 mg, Q6H PRN    diphenhydrAMINE-zinc acetate (BENADRYL) 2-0.1 % cream, Daily PRN    famotidine (PEPCID) tablet 20 mg, BID    hydrOXYzine HCL (ATARAX) tablet 100 mg, Q6H PRN Max 4/day **OR** LORazepam (ATIVAN) injection 2 mg, Q6H PRN    hydrOXYzine HCL (ATARAX) tablet 25 mg, Q6H PRN Max 4/day    levothyroxine tablet 37.5 mcg, Early Morning    melatonin " tablet 6 mg, HS PRN    methocarbamol (ROBAXIN) tablet 500 mg, Q6H PRN    OLANZapine (ZyPREXA) tablet 5 mg, Q4H PRN Max 3/day **OR** OLANZapine (ZyPREXA) IM injection 2.5 mg, Q4H PRN Max 3/day    OLANZapine (ZyPREXA) tablet 5 mg, Q3H PRN Max 3/day **OR** OLANZapine (ZyPREXA) IM injection 5 mg, Q3H PRN Max 3/day    OLANZapine (ZyPREXA) tablet 2.5 mg, Q4H PRN Max 6/day    polyethylene glycol (MIRALAX) packet 17 g, Daily PRN    propranolol (INDERAL) tablet 10 mg, Q8H PRN    senna-docusate sodium (SENOKOT S) 8.6-50 mg per tablet 1 tablet, Daily PRN    sertraline (ZOLOFT) tablet 150 mg, Daily    traZODone (DESYREL) tablet 50 mg, HS PRN.      Lab Results: I have reviewed the following results: none  Labs in last 72 hours:   Recent Labs     09/19/24  0640   CHOLESTEROL 168   HDL 42   TRIG 137   LDLCALC 99       Administrative Statements   I have spent a total time of 20 minutes in caring for this patient on the day of the visit/encounter including Documenting in the medical record, Reviewing / ordering tests, medicine, procedures  , and Obtaining or reviewing history  .

## 2024-09-19 NOTE — ASSESSMENT & PLAN NOTE
-Continue Abilify 5mg Daily  -Continue Sertraline 150mg Daily      The nursery ICU informed heart station that an EKG for Baby Kenneth had the wrong name on it; the name on the EKG 3 May 2019, 10:19:54 is Kevin Beckman. This is NOT  his EKG. I informed the 2C that this is not Veto Attila Nam's EKG. [facility] will be informed and they must delete this EKG from [facility] chart because the EKG has been already read. The charge has been deleted.

## 2024-09-19 NOTE — PROGRESS NOTES
09/19/24 0846   Team Meeting   Meeting Type Daily Rounds   Team Members Present   Team Members Present Physician;Nurse;   Physician Team Member Aria   Nursing Team Member [facility]   Care Management Team Member Noa   Patient/Family Present   Patient Present No   Patient's Family Present No     Pt is medication and  meal compliant. Pt is social with select peers and staff. Pt denies SI/HI/AVH. Pt's discharge is pending placement in group home.

## 2024-09-19 NOTE — NURSING NOTE
Pt denies SI/HI/AH/VH. Present in dayroom and milieu but is mostly isolative to room. Medication and meal compliant. Minimally Social with select peers. No further concerns as of present. Plan of care ongoing.

## 2024-09-20 PROCEDURE — 99232 SBSQ HOSP IP/OBS MODERATE 35: CPT | Performed by: PSYCHIATRY & NEUROLOGY

## 2024-09-20 RX ADMIN — LEVOTHYROXINE SODIUM 37.5 MCG: 125 TABLET ORAL at 05:37

## 2024-09-20 RX ADMIN — FAMOTIDINE 20 MG: 20 TABLET ORAL at 18:19

## 2024-09-20 RX ADMIN — CYANOCOBALAMIN TAB 1000 MCG 1000 MCG: 1000 TAB at 09:54

## 2024-09-20 RX ADMIN — ARIPIPRAZOLE 5 MG: 5 TABLET ORAL at 09:54

## 2024-09-20 RX ADMIN — FAMOTIDINE 20 MG: 20 TABLET ORAL at 09:55

## 2024-09-20 RX ADMIN — ATORVASTATIN CALCIUM 10 MG: 10 TABLET, FILM COATED ORAL at 18:19

## 2024-09-20 RX ADMIN — SERTRALINE HYDROCHLORIDE 150 MG: 100 TABLET ORAL at 09:54

## 2024-09-20 RX ADMIN — CHOLECALCIFEROL TAB 25 MCG (1000 UNIT) 2000 UNITS: 25 TAB at 09:54

## 2024-09-20 NOTE — PROGRESS NOTES
"Progress Note - Behavioral Health   Name: Franco Roberson 39 y.o. male I MRN: [id number]  Unit/Bed#: -02 I Date of Admission: 5/14/2024   Date of Service: 9/20/2024 I Hospital Day: 129     Assessment & Plan  MDD (major depressive disorder), recurrent severe, without psychosis (HCC)  -Continue Abilify 5mg Daily  -Continue Sertraline 150mg Daily     Autism spectrum disorder      Progress Toward Goals: Progressing  Recommended Treatment: Continue with group therapy, milieu therapy and occupational therapy.      Risks, benefits and possible side effects of Medications:   Risks, benefits, and possible side effects of medications explained to patient and patient verbalizes understanding.      History of Present Illness   Behavior over the last 24 hours:  unchanged  Sleep: normal  Appetite: normal  Medication side effects: No  ROS: no complaints and all other systems are negative    Subjective:Patient seen in hallway waiting for morning vitals, he reports awaiting for social security now for funding for a group placement. He is tolerating his medications, doing well on the unit with improved eye contact and interactions from last time this writer rounded.    Objective   Wt Readings from Last 3 Encounters:   09/15/24 132 kg (291 lb 12.8 oz)   05/14/24 128 kg (281 lb 6.4 oz)   03/17/17 118 kg (260 lb)     Temp Readings from Last 3 Encounters:   09/21/24 (!) 96.7 °F (35.9 °C) (Temporal)   05/14/24 98 °F (36.7 °C)   03/17/17 98.8 °F (37.1 °C) (Tympanic)     BP Readings from Last 3 Encounters:   09/21/24 116/74   05/14/24 140/93   03/17/17 137/67     Pulse Readings from Last 3 Encounters:   09/21/24 71   05/14/24 72   03/17/17 94       Mental Status Evaluation:  appearance Casually dressed, mildly disheveled   Behavior:  Cooperative   Speech:  soft and scant   Mood:  \" Pretty good\"   Affect:  constricted   Thought Process:  concrete   Associations: concrete associations   Thought Content:  No overt delusions   Perceptual " Disturbances: None   Risk Potential: Suicidal Ideations none  Homicidal Ideations none  Potential for Aggression No   Sensorium:  person, place, and time/date   Memory:  recent and remote memory grossly intact   Consciousness:  alert and awake    Attention: attention span and concentration were age appropriate   Insight:  limited   Judgment: limited   Gait/Station: normal gait/station   Motor Activity: no abnormal movements     Medications: all current active meds have been reviewed and current meds:   Current Facility-Administered Medications:     acetaminophen (TYLENOL) tablet 650 mg, Q6H PRN    acetaminophen (TYLENOL) tablet 650 mg, Q4H PRN    acetaminophen (TYLENOL) tablet 975 mg, Q6H PRN    aluminum-magnesium hydroxide-simethicone (MAALOX) oral suspension 30 mL, Q4H PRN    ARIPiprazole (ABILIFY) tablet 5 mg, Daily    Artificial Tears ophthalmic solution 1 drop, Q3H PRN    atorvastatin (LIPITOR) tablet 10 mg, Daily With Dinner    benztropine (COGENTIN) injection 1 mg, Q4H PRN Max 6/day    benztropine (COGENTIN) tablet 1 mg, Q4H PRN Max 6/day    Cholecalciferol (VITAMIN D3) tablet 2,000 Units, Daily    cyanocobalamin (VITAMIN B-12) tablet 1,000 mcg, Daily    Diclofenac Sodium (VOLTAREN) 1 % topical gel 2 g, 4x Daily PRN    hydrOXYzine HCL (ATARAX) tablet 50 mg, Q6H PRN Max 4/day **OR** diphenhydrAMINE (BENADRYL) injection 50 mg, Q6H PRN    diphenhydrAMINE-zinc acetate (BENADRYL) 2-0.1 % cream, Daily PRN    famotidine (PEPCID) tablet 20 mg, BID    hydrOXYzine HCL (ATARAX) tablet 100 mg, Q6H PRN Max 4/day **OR** LORazepam (ATIVAN) injection 2 mg, Q6H PRN    hydrOXYzine HCL (ATARAX) tablet 25 mg, Q6H PRN Max 4/day    levothyroxine tablet 37.5 mcg, Early Morning    melatonin tablet 6 mg, HS PRN    methocarbamol (ROBAXIN) tablet 500 mg, Q6H PRN    OLANZapine (ZyPREXA) tablet 5 mg, Q4H PRN Max 3/day **OR** OLANZapine (ZyPREXA) IM injection 2.5 mg, Q4H PRN Max 3/day    OLANZapine (ZyPREXA) tablet 5 mg, Q3H PRN Max 3/day  **OR** OLANZapine (ZyPREXA) IM injection 5 mg, Q3H PRN Max 3/day    OLANZapine (ZyPREXA) tablet 2.5 mg, Q4H PRN Max 6/day    polyethylene glycol (MIRALAX) packet 17 g, Daily PRN    propranolol (INDERAL) tablet 10 mg, Q8H PRN    senna-docusate sodium (SENOKOT S) 8.6-50 mg per tablet 1 tablet, Daily PRN    sertraline (ZOLOFT) tablet 150 mg, Daily    traZODone (DESYREL) tablet 50 mg, HS PRN.      Lab Results: I have reviewed the following results: CBC/BMP: No new results in last 24 hours.   Most Recent Labs:   Lab Results   Component Value Date    WBC 7.01 06/27/2024    RBC 4.54 06/27/2024    HGB 14.5 06/27/2024    HCT 44.4 06/27/2024     06/27/2024    RDW 12.0 06/27/2024    NEUTROABS 4.01 06/27/2024    SODIUM 139 07/10/2024    K 3.8 07/10/2024     07/10/2024    CO2 27 07/10/2024    BUN 16 07/10/2024    CREATININE 0.92 07/10/2024    GLUC 87 07/10/2024    GLUF 87 07/10/2024    CALCIUM 9.3 07/10/2024    AST 28 07/10/2024    ALT 36 07/10/2024    ALKPHOS 66 07/10/2024    TP 7.7 07/10/2024    ALB 4.4 07/10/2024    TBILI 0.55 07/10/2024    CHOLESTEROL 168 09/19/2024    HDL 42 09/19/2024    TRIG 137 09/19/2024    LDLCALC 99 09/19/2024    NONHDLC 126 09/19/2024    [id number] 6.241 (H) 09/10/2024    FREET4 0.56 (L) 09/10/2024       Administrative Statements   I have spent a total time of 25 minutes in caring for this patient on the day of the visit/encounter including Counseling / Coordination of care, Reviewing / ordering tests, medicine, procedures  , and Obtaining or reviewing history  .

## 2024-09-20 NOTE — PLAN OF CARE
Problem: Ineffective Coping  Goal: Identifies ineffective coping skills  Outcome: Progressing  Goal: Identifies healthy coping skills  Outcome: Progressing  Goal: Demonstrates healthy coping skills  Outcome: Progressing     Problem: Depression  Goal: Verbalize thoughts and feelings  Description: Interventions:  - Assess and re-assess patient's level of risk   - Engage patient in 1:1 interactions, daily, for a minimum of 15 minutes   - Encourage patient to express feelings, fears, frustrations, hopes   Outcome: Progressing  Goal: Refrain from harming self  Description: Interventions:  - Monitor patient closely, per order   - Supervise medication ingestion, monitor effects and side effects   Outcome: Progressing

## 2024-09-20 NOTE — NURSING NOTE
Patient was assessed in dayroom during group while administering medication, patient reports no AVH/SI/HI. Patient is pleasant and cooperative. Patient has had no behavioral issues to note. Patient has been med compliant. Patient states they have no further needs at this time.

## 2024-09-20 NOTE — PROGRESS NOTES
"Progress Note - Behavioral Health   Name: Franco Roberson 39 y.o. male I MRN: [id number]  Unit/Bed#: -02 I Date of Admission: 5/14/2024   Date of Service: 9/20/2024 I Hospital Day: 129    Assessment & Plan  MDD (major depressive disorder), recurrent severe, without psychosis (HCC)  -Continue Abilify 5mg Daily  -Continue Sertraline 150mg Daily     Autism spectrum disorder      Progress Toward Goals: Franco is currently assessed as being at their baseline with continued need for medication management, supervision for safety and ADL’s. These services are not currently available in a less restrictive environment necessitating continued hospital stay.  Franco should remain on the unit until these services are available, due to likelihood of mental decompensation and readmission if discharged to an unsupervised setting.  Assertive discharge planning and collaboration with multiple providers (inpatient and community based) remains ongoing.  Franco is currently awaiting for group home placement      Recommended Treatment: Continue with group therapy, milieu therapy and occupational therapy.      Risks, benefits and possible side effects of Medications:   Risks, benefits, and possible side effects of medications explained to patient and patient verbalizes understanding.      History of Present Illness   Behavior over the last 24 hours:  unchanged  Sleep: normal  Appetite: normal  Medication side effects: No  ROS: no complaints    Subjective: On evaluation Franco was lying in bed and states that today he is doing \"good.\"  We discussed his self-care routine and he states that every day he brushes his teeth, he takes a shower about every other day but he has not brushed his hair.  He states that usually his hair is short and since being admitted he has just been letting it grow out.  At this time he denies thoughts of suicide or self-harm.  Any questions and concerns were addressed.    Objective   Mental Status " "Evaluation:  Appearance:  Age-appropriate, marginal hygiene, casually dressed   Behavior:  Cooperative   Speech:  soft and scant   Mood:  \"Good \"   Affect:  constricted   Thought Process:  concrete   Associations: concrete associations   Thought Content:  No overt delusions   Perceptual Disturbances: None   Risk Potential: Suicidal Ideations none  Homicidal Ideations none  Potential for Aggression No   Sensorium:  person, place, and time/date   Memory:  recent and remote memory grossly intact   Consciousness:  alert and awake    Attention: attention span and concentration were age appropriate   Insight:  limited   Judgment: limited   Gait/Station: normal gait/station   Motor Activity: no abnormal movements     Medications: all current active meds have been reviewed and current meds:   Current Facility-Administered Medications:     acetaminophen (TYLENOL) tablet 650 mg, Q6H PRN    acetaminophen (TYLENOL) tablet 650 mg, Q4H PRN    acetaminophen (TYLENOL) tablet 975 mg, Q6H PRN    aluminum-magnesium hydroxide-simethicone (MAALOX) oral suspension 30 mL, Q4H PRN    ARIPiprazole (ABILIFY) tablet 5 mg, Daily    Artificial Tears ophthalmic solution 1 drop, Q3H PRN    atorvastatin (LIPITOR) tablet 10 mg, Daily With Dinner    benztropine (COGENTIN) injection 1 mg, Q4H PRN Max 6/day    benztropine (COGENTIN) tablet 1 mg, Q4H PRN Max 6/day    Cholecalciferol (VITAMIN D3) tablet 2,000 Units, Daily    cyanocobalamin (VITAMIN B-12) tablet 1,000 mcg, Daily    Diclofenac Sodium (VOLTAREN) 1 % topical gel 2 g, 4x Daily PRN    hydrOXYzine HCL (ATARAX) tablet 50 mg, Q6H PRN Max 4/day **OR** diphenhydrAMINE (BENADRYL) injection 50 mg, Q6H PRN    diphenhydrAMINE-zinc acetate (BENADRYL) 2-0.1 % cream, Daily PRN    famotidine (PEPCID) tablet 20 mg, BID    hydrOXYzine HCL (ATARAX) tablet 100 mg, Q6H PRN Max 4/day **OR** LORazepam (ATIVAN) injection 2 mg, Q6H PRN    hydrOXYzine HCL (ATARAX) tablet 25 mg, Q6H PRN Max 4/day    levothyroxine " tablet 37.5 mcg, Early Morning    melatonin tablet 6 mg, HS PRN    methocarbamol (ROBAXIN) tablet 500 mg, Q6H PRN    OLANZapine (ZyPREXA) tablet 5 mg, Q4H PRN Max 3/day **OR** OLANZapine (ZyPREXA) IM injection 2.5 mg, Q4H PRN Max 3/day    OLANZapine (ZyPREXA) tablet 5 mg, Q3H PRN Max 3/day **OR** OLANZapine (ZyPREXA) IM injection 5 mg, Q3H PRN Max 3/day    OLANZapine (ZyPREXA) tablet 2.5 mg, Q4H PRN Max 6/day    polyethylene glycol (MIRALAX) packet 17 g, Daily PRN    propranolol (INDERAL) tablet 10 mg, Q8H PRN    senna-docusate sodium (SENOKOT S) 8.6-50 mg per tablet 1 tablet, Daily PRN    sertraline (ZOLOFT) tablet 150 mg, Daily    traZODone (DESYREL) tablet 50 mg, HS PRN.      Lab Results: I have reviewed the following results: none  Labs in last 72 hours:   Recent Labs     09/19/24  0640   CHOLESTEROL 168   HDL 42   TRIG 137   LDLCALC 99       Administrative Statements   I have spent a total time of 20 minutes in caring for this patient on the day of the visit/encounter including Documenting in the medical record, Reviewing / ordering tests, medicine, procedures  , and Obtaining or reviewing history  .

## 2024-09-21 PROCEDURE — 99232 SBSQ HOSP IP/OBS MODERATE 35: CPT | Performed by: PSYCHIATRY & NEUROLOGY

## 2024-09-21 RX ADMIN — CYANOCOBALAMIN TAB 1000 MCG 1000 MCG: 1000 TAB at 08:53

## 2024-09-21 RX ADMIN — ATORVASTATIN CALCIUM 10 MG: 10 TABLET, FILM COATED ORAL at 17:47

## 2024-09-21 RX ADMIN — ARIPIPRAZOLE 5 MG: 5 TABLET ORAL at 08:53

## 2024-09-21 RX ADMIN — FAMOTIDINE 20 MG: 20 TABLET ORAL at 08:53

## 2024-09-21 RX ADMIN — SERTRALINE HYDROCHLORIDE 150 MG: 100 TABLET ORAL at 08:53

## 2024-09-21 RX ADMIN — FAMOTIDINE 20 MG: 20 TABLET ORAL at 17:47

## 2024-09-21 RX ADMIN — LEVOTHYROXINE SODIUM 37.5 MCG: 125 TABLET ORAL at 06:33

## 2024-09-21 RX ADMIN — CHOLECALCIFEROL TAB 25 MCG (1000 UNIT) 2000 UNITS: 25 TAB at 08:53

## 2024-09-21 NOTE — PLAN OF CARE
Problem: Ineffective Coping  Goal: Identifies ineffective coping skills  Outcome: Progressing  Goal: Identifies healthy coping skills  Outcome: Progressing  Goal: Demonstrates healthy coping skills  Outcome: Progressing  Goal: Participates in unit activities  Description: Interventions:  - Provide therapeutic environment   - Provide required programming   - Redirect inappropriate behaviors   Outcome: Progressing     Problem: Risk for Self Injury/Neglect  Goal: Treatment Goal: Remain safe during length of stay, learn and adopt new coping skills, and be free of self-injurious ideation, impulses and acts at the time of discharge  Outcome: Progressing     Problem: Depression  Goal: Treatment Goal: Demonstrate behavioral control of depressive symptoms, verbalize feelings of improved mood/affect, and adopt new coping skills prior to discharge  Outcome: Progressing  Goal: Verbalize thoughts and feelings  Description: Interventions:  - Assess and re-assess patient's level of risk   - Engage patient in 1:1 interactions, daily, for a minimum of 15 minutes   - Encourage patient to express feelings, fears, frustrations, hopes   Outcome: Progressing  Goal: Refrain from harming self  Description: Interventions:  - Monitor patient closely, per order   - Supervise medication ingestion, monitor effects and side effects   Outcome: Progressing  Goal: Refrain from isolation  Description: Interventions:  - Develop a trusting relationship   - Encourage socialization   Outcome: Progressing  Goal: Refrain from self-neglect  Outcome: Progressing  Goal: Attend and participate in unit activities, including therapeutic, recreational, and educational groups  Description: Interventions:  - Provide therapeutic and educational activities daily, encourage attendance and participation, and document same in the medical record   Outcome: Progressing  Goal: Complete daily ADLs, including personal hygiene independently, as able  Description:  Interventions:  - Observe, teach, and assist patient with ADLS  -  Monitor and promote a balance of rest/activity, with adequate nutrition and elimination   Outcome: Progressing     Problem: Anxiety  Goal: Anxiety is at manageable level  Description: Interventions:  - Assess and monitor patient's anxiety level.   - Monitor for signs and symptoms (heart palpitations, chest pain, shortness of breath, headaches, nausea, feeling jumpy, restlessness, irritable, apprehensive).   - Collaborate with interdisciplinary team and initiate plan and interventions as ordered.  - [place] patient to unit/surroundings  - Explain treatment plan  - Encourage participation in care  - Encourage verbalization of concerns/fears  - Identify coping mechanisms  - Assist in developing anxiety-reducing skills  - Administer/offer alternative therapies  - Limit or eliminate stimulants  Outcome: Progressing     Problem: DISCHARGE PLANNING - CARE MANAGEMENT  Goal: Discharge to post-acute care or home with appropriate resources  Description: INTERVENTIONS:  - Conduct assessment to determine patient/family and health care team treatment goals, and need for post-acute services based on payer coverage, community resources, and patient preferences, and barriers to discharge  - Address psychosocial, clinical, and financial barriers to discharge as identified in assessment in conjunction with the patient/family and health care team  - Arrange appropriate level of post-acute services according to patient’s   needs and preference and payer coverage in collaboration with the physician and health care team  - Communicate with and update the patient/family, physician, and health care team regarding progress on the discharge plan  - Arrange appropriate transportation to post-acute venues  Outcome: Progressing     Problem: Knowledge Deficit  Goal: Patient/family/caregiver demonstrates understanding of disease process, treatment plan, medications, and discharge  instructions  Description: Complete learning assessment and assess knowledge base.  Interventions:  - Provide teaching at level of understanding  - Provide teaching via preferred learning methods  Outcome: Progressing     Problem: SLEEP DISTURBANCE  Goal: Will exhibit normal sleeping pattern  Description: Interventions:  -  Assess the patients sleep pattern, noting recent changes  - Administer medication as ordered  - Decrease environmental stimuli, including noise, as appropriate during the night  - Encourage the patient to actively participate in unit groups and or exercise during the day to enhance ability to achieve adequate sleep at night  - Assess the patient, in the morning, encouraging a description of sleep experience  Outcome: Progressing

## 2024-09-21 NOTE — NURSING NOTE
Patient is visible on the milieu and in the dayroom. Not observed socializing with or staff, remains withdrawn, Denies all psych symptoms but seems flat. Patient does not report any unmet need at this time. Continual rounding maintained for safety.

## 2024-09-21 NOTE — NURSING NOTE
Patient was assessed in hallway during medication administration after finishing breakfast, patient reports no AVH/SI/HI. Patient has had no behavioral issues to note. Patient has been med compliant. Patient states they have no further needs at this time. Patient denies feeling depressed or anxious currently. Patient brightens upon approach.

## 2024-09-22 PROCEDURE — 99232 SBSQ HOSP IP/OBS MODERATE 35: CPT | Performed by: PSYCHIATRY & NEUROLOGY

## 2024-09-22 RX ADMIN — FAMOTIDINE 20 MG: 20 TABLET ORAL at 18:03

## 2024-09-22 RX ADMIN — SERTRALINE HYDROCHLORIDE 150 MG: 100 TABLET ORAL at 09:29

## 2024-09-22 RX ADMIN — ATORVASTATIN CALCIUM 10 MG: 10 TABLET, FILM COATED ORAL at 18:03

## 2024-09-22 RX ADMIN — FAMOTIDINE 20 MG: 20 TABLET ORAL at 09:29

## 2024-09-22 RX ADMIN — CHOLECALCIFEROL TAB 25 MCG (1000 UNIT) 2000 UNITS: 25 TAB at 09:29

## 2024-09-22 RX ADMIN — LEVOTHYROXINE SODIUM 37.5 MCG: 125 TABLET ORAL at 06:32

## 2024-09-22 RX ADMIN — CYANOCOBALAMIN TAB 1000 MCG 1000 MCG: 1000 TAB at 09:29

## 2024-09-22 RX ADMIN — ARIPIPRAZOLE 5 MG: 5 TABLET ORAL at 09:29

## 2024-09-22 NOTE — NURSING NOTE
Patient denies AVH/SI/HI and he brightens upon approach. He denies depression, and anxiety but appears flat in effect. He is cooperative, seclusive to self but is visible on the unit ambulating halls. He denies having further needs. He reports being hopeful about a potential expedited social security process due to his dx.

## 2024-09-22 NOTE — PLAN OF CARE
Problem: Ineffective Coping  Goal: Identifies ineffective coping skills  Outcome: Progressing  Goal: Identifies healthy coping skills  Outcome: Progressing  Goal: Demonstrates healthy coping skills  Outcome: Progressing  Goal: Participates in unit activities  Description: Interventions:  - Provide therapeutic environment   - Provide required programming   - Redirect inappropriate behaviors   Outcome: Progressing     Problem: Risk for Self Injury/Neglect  Goal: Treatment Goal: Remain safe during length of stay, learn and adopt new coping skills, and be free of self-injurious ideation, impulses and acts at the time of discharge  Outcome: Progressing     Problem: Depression  Goal: Treatment Goal: Demonstrate behavioral control of depressive symptoms, verbalize feelings of improved mood/affect, and adopt new coping skills prior to discharge  Outcome: Progressing  Goal: Verbalize thoughts and feelings  Description: Interventions:  - Assess and re-assess patient's level of risk   - Engage patient in 1:1 interactions, daily, for a minimum of 15 minutes   - Encourage patient to express feelings, fears, frustrations, hopes   Outcome: Progressing  Goal: Refrain from harming self  Description: Interventions:  - Monitor patient closely, per order   - Supervise medication ingestion, monitor effects and side effects   Outcome: Progressing  Goal: Refrain from isolation  Description: Interventions:  - Develop a trusting relationship   - Encourage socialization   Outcome: Progressing  Goal: Refrain from self-neglect  Outcome: Progressing  Goal: Attend and participate in unit activities, including therapeutic, recreational, and educational groups  Description: Interventions:  - Provide therapeutic and educational activities daily, encourage attendance and participation, and document same in the medical record   Outcome: Progressing  Goal: Complete daily ADLs, including personal hygiene independently, as able  Description:  Interventions:  - Observe, teach, and assist patient with ADLS  -  Monitor and promote a balance of rest/activity, with adequate nutrition and elimination   Outcome: Progressing     Problem: Anxiety  Goal: Anxiety is at manageable level  Description: Interventions:  - Assess and monitor patient's anxiety level.   - Monitor for signs and symptoms (heart palpitations, chest pain, shortness of breath, headaches, nausea, feeling jumpy, restlessness, irritable, apprehensive).   - Collaborate with interdisciplinary team and initiate plan and interventions as ordered.  - [place] patient to unit/surroundings  - Explain treatment plan  - Encourage participation in care  - Encourage verbalization of concerns/fears  - Identify coping mechanisms  - Assist in developing anxiety-reducing skills  - Administer/offer alternative therapies  - Limit or eliminate stimulants  Outcome: Progressing     Problem: DISCHARGE PLANNING - CARE MANAGEMENT  Goal: Discharge to post-acute care or home with appropriate resources  Description: INTERVENTIONS:  - Conduct assessment to determine patient/family and health care team treatment goals, and need for post-acute services based on payer coverage, community resources, and patient preferences, and barriers to discharge  - Address psychosocial, clinical, and financial barriers to discharge as identified in assessment in conjunction with the patient/family and health care team  - Arrange appropriate level of post-acute services according to patient’s   needs and preference and payer coverage in collaboration with the physician and health care team  - Communicate with and update the patient/family, physician, and health care team regarding progress on the discharge plan  - Arrange appropriate transportation to post-acute venues  Outcome: Progressing     Problem: Knowledge Deficit  Goal: Patient/family/caregiver demonstrates understanding of disease process, treatment plan, medications, and discharge  instructions  Description: Complete learning assessment and assess knowledge base.  Interventions:  - Provide teaching at level of understanding  - Provide teaching via preferred learning methods  Outcome: Progressing     Problem: SLEEP DISTURBANCE  Goal: Will exhibit normal sleeping pattern  Description: Interventions:  -  Assess the patients sleep pattern, noting recent changes  - Administer medication as ordered  - Decrease environmental stimuli, including noise, as appropriate during the night  - Encourage the patient to actively participate in unit groups and or exercise during the day to enhance ability to achieve adequate sleep at night  - Assess the patient, in the morning, encouraging a description of sleep experience  Outcome: Progressing

## 2024-09-22 NOTE — PROGRESS NOTES
"Progress Note - Behavioral Health   Name: Franco Roberson 39 y.o. male I MRN: [id number]  Unit/Bed#: -02 I Date of Admission: 5/14/2024   Date of Service: 9/22/2024 I Hospital Day: 131     Assessment & Plan  MDD (major depressive disorder), recurrent severe, without psychosis (HCC)  -Continue Abilify 5mg Daily  -Continue Sertraline 150mg Daily     Autism spectrum disorder      Progress Toward Goals: slowly progressing    Recommended Treatment: Continue with group therapy, milieu therapy and occupational therapy.      Risks, benefits and possible side effects of Medications:   Risks, benefits, and possible side effects of medications explained to patient and patient verbalizes understanding.      History of Present Illness   Behavior over the last 24 hours:  unchanged  Sleep: normal  Appetite: normal  Medication side effects: No  ROS: no complaints and all other systems are negative    Subjective:Patient seen in hallway for vital signs and he reports doing well with the medications, affect is more reactive and he said he may sit with peers today to watch the game if it is on \"I won't seek it out but if it is on I may stay to watch football\".    Objective   Mental Status Evaluation:  appearance Casually dressed, mildly disheveled   Behavior:  Cooperative, improving eye contact   Speech:  soft and scant   Mood:  \" Pretty good\"   Affect:  Constricted but did smile appropriately   Thought Process:  concrete   Associations: concrete associations   Thought Content:  No overt delusions   Perceptual Disturbances: None   Risk Potential: Suicidal Ideations none  Homicidal Ideations none  Potential for Aggression No   Sensorium:  person, place, and time/date   Memory:  recent and remote memory grossly intact   Consciousness:  alert and awake    Attention: attention span and concentration were age appropriate   Insight:  improving   Judgment: improving   Gait/Station: normal gait/station   Motor Activity: no abnormal movements "     Medications: all current active meds have been reviewed, continue current psychiatric medications, and current meds:   Current Facility-Administered Medications:     acetaminophen (TYLENOL) tablet 650 mg, Q6H PRN    acetaminophen (TYLENOL) tablet 650 mg, Q4H PRN    acetaminophen (TYLENOL) tablet 975 mg, Q6H PRN    aluminum-magnesium hydroxide-simethicone (MAALOX) oral suspension 30 mL, Q4H PRN    ARIPiprazole (ABILIFY) tablet 5 mg, Daily    Artificial Tears ophthalmic solution 1 drop, Q3H PRN    atorvastatin (LIPITOR) tablet 10 mg, Daily With Dinner    benztropine (COGENTIN) injection 1 mg, Q4H PRN Max 6/day    benztropine (COGENTIN) tablet 1 mg, Q4H PRN Max 6/day    Cholecalciferol (VITAMIN D3) tablet 2,000 Units, Daily    cyanocobalamin (VITAMIN B-12) tablet 1,000 mcg, Daily    Diclofenac Sodium (VOLTAREN) 1 % topical gel 2 g, 4x Daily PRN    hydrOXYzine HCL (ATARAX) tablet 50 mg, Q6H PRN Max 4/day **OR** diphenhydrAMINE (BENADRYL) injection 50 mg, Q6H PRN    diphenhydrAMINE-zinc acetate (BENADRYL) 2-0.1 % cream, Daily PRN    famotidine (PEPCID) tablet 20 mg, BID    hydrOXYzine HCL (ATARAX) tablet 100 mg, Q6H PRN Max 4/day **OR** LORazepam (ATIVAN) injection 2 mg, Q6H PRN    hydrOXYzine HCL (ATARAX) tablet 25 mg, Q6H PRN Max 4/day    levothyroxine tablet 37.5 mcg, Early Morning    melatonin tablet 6 mg, HS PRN    methocarbamol (ROBAXIN) tablet 500 mg, Q6H PRN    OLANZapine (ZyPREXA) tablet 5 mg, Q4H PRN Max 3/day **OR** OLANZapine (ZyPREXA) IM injection 2.5 mg, Q4H PRN Max 3/day    OLANZapine (ZyPREXA) tablet 5 mg, Q3H PRN Max 3/day **OR** OLANZapine (ZyPREXA) IM injection 5 mg, Q3H PRN Max 3/day    OLANZapine (ZyPREXA) tablet 2.5 mg, Q4H PRN Max 6/day    polyethylene glycol (MIRALAX) packet 17 g, Daily PRN    propranolol (INDERAL) tablet 10 mg, Q8H PRN    senna-docusate sodium (SENOKOT S) 8.6-50 mg per tablet 1 tablet, Daily PRN    sertraline (ZOLOFT) tablet 150 mg, Daily    traZODone (DESYREL) tablet 50 mg,  HS PRN.      Lab Results: I have reviewed the following results: none  Most Recent Labs:   Lab Results   Component Value Date    WBC 7.01 06/27/2024    RBC 4.54 06/27/2024    HGB 14.5 06/27/2024    HCT 44.4 06/27/2024     06/27/2024    RDW 12.0 06/27/2024    NEUTROABS 4.01 06/27/2024    SODIUM 139 07/10/2024    K 3.8 07/10/2024     07/10/2024    CO2 27 07/10/2024    BUN 16 07/10/2024    CREATININE 0.92 07/10/2024    GLUC 87 07/10/2024    GLUF 87 07/10/2024    CALCIUM 9.3 07/10/2024    AST 28 07/10/2024    ALT 36 07/10/2024    ALKPHOS 66 07/10/2024    TP 7.7 07/10/2024    ALB 4.4 07/10/2024    TBILI 0.55 07/10/2024    CHOLESTEROL 168 09/19/2024    HDL 42 09/19/2024    TRIG 137 09/19/2024    LDLCALC 99 09/19/2024    NONHDLC 126 09/19/2024    [id number] 6.241 (H) 09/10/2024    FREET4 0.56 (L) 09/10/2024     Wt Readings from Last 3 Encounters:   09/22/24 131 kg (289 lb 9.6 oz)   05/14/24 128 kg (281 lb 6.4 oz)   03/17/17 118 kg (260 lb)     Temp Readings from Last 3 Encounters:   09/22/24 (!) 96.6 °F (35.9 °C) (Temporal)   05/14/24 98 °F (36.7 °C)   03/17/17 98.8 °F (37.1 °C) (Tympanic)     BP Readings from Last 3 Encounters:   09/22/24 105/71   05/14/24 140/93   03/17/17 137/67     Pulse Readings from Last 3 Encounters:   09/22/24 66   05/14/24 72   03/17/17 94         Administrative Statements   I have spent a total time of 30 minutes in caring for this patient on the day of the visit/encounter including Counseling / Coordination of care, Documenting in the medical record, and Communicating with other healthcare professionals .

## 2024-09-22 NOTE — NURSING NOTE
Pt is quiet and withdrawn. Pt can be seen walking the hallway or out in the milieu. Pt denies depression and anxiety. Pt denies SI/HI and AH/VH. No unmet needs reported. Will continue to monitor.

## 2024-09-23 PROCEDURE — 99232 SBSQ HOSP IP/OBS MODERATE 35: CPT | Performed by: PSYCHIATRY & NEUROLOGY

## 2024-09-23 RX ADMIN — CYANOCOBALAMIN TAB 1000 MCG 1000 MCG: 1000 TAB at 08:15

## 2024-09-23 RX ADMIN — LEVOTHYROXINE SODIUM 37.5 MCG: 125 TABLET ORAL at 06:21

## 2024-09-23 RX ADMIN — FAMOTIDINE 20 MG: 20 TABLET ORAL at 17:09

## 2024-09-23 RX ADMIN — ARIPIPRAZOLE 5 MG: 5 TABLET ORAL at 08:15

## 2024-09-23 RX ADMIN — CHOLECALCIFEROL TAB 25 MCG (1000 UNIT) 2000 UNITS: 25 TAB at 08:15

## 2024-09-23 RX ADMIN — FAMOTIDINE 20 MG: 20 TABLET ORAL at 08:15

## 2024-09-23 RX ADMIN — SERTRALINE HYDROCHLORIDE 150 MG: 100 TABLET ORAL at 08:15

## 2024-09-23 RX ADMIN — ATORVASTATIN CALCIUM 10 MG: 10 TABLET, FILM COATED ORAL at 17:09

## 2024-09-23 NOTE — PLAN OF CARE
Pt more visible in groups during the week. Pt often does not attend groups over the weekend. Pt typically quiet in groups, but participates appropriately.

## 2024-09-23 NOTE — NURSING NOTE
Pt is withdrawn to self, pleasant and cooperative upon approach. Visible as day the progresses. Reports improved sleep, medication and meal compliant. Denies SI, HI, AH, and VH. Pt appears sad, but denies depression. Denies any needs at this time.

## 2024-09-23 NOTE — PROGRESS NOTES
Progress Note - Behavioral Health   Name: Franco Roberson 39 y.o. male I MRN: [id number]  Unit/Bed#: -02 I Date of Admission: 5/14/2024   Date of Service: 9/23/2024 I Hospital Day: 132     Assessment & Plan  MDD (major depressive disorder), recurrent severe, without psychosis (HCC)  -Continue Abilify 5mg Daily  -Continue Sertraline 150mg Daily     Autism spectrum disorder      Current medications:  Current Facility-Administered Medications   Medication Dose Route Frequency Provider Last Rate    acetaminophen  650 mg Oral Q6H PRN Basilio Brown MD      acetaminophen  650 mg Oral Q4H PRN Basilio Brown MD      acetaminophen  975 mg Oral Q6H PRN Basilio Brown MD      aluminum-magnesium hydroxide-simethicone  30 mL Oral Q4H PRN Basilio Brown MD      ARIPiprazole  5 mg Oral Daily Basilio Panda MD      Artificial Tears  1 drop Both Eyes Q3H PRN Basilio Brown MD      atorvastatin  10 mg Oral Daily With Dinner WALLY Baptiste      benztropine  1 mg Intramuscular Q4H PRN Max 6/day Basilio Brown MD      benztropine  1 mg Oral Q4H PRN Max 6/day Basilio Brown MD      Cholecalciferol  2,000 Units Oral Daily WALLY Baptiste      cyanocobalamin  1,000 mcg Oral Daily WALLY Baptiste      Diclofenac Sodium  2 g Topical 4x Daily PRN WALLY Baptiste      hydrOXYzine HCL  50 mg Oral Q6H PRN Max 4/day Basilio Brown MD      Or    diphenhydrAMINE  50 mg Intramuscular Q6H PRN Basilio Brown MD      diphenhydrAMINE-zinc acetate   Topical Daily PRN Raj Guerrero MD      famotidine  20 mg Oral BID WALLY Baptiste      hydrOXYzine HCL  100 mg Oral Q6H PRN Max 4/day Basilio Brown MD      Or    LORazepam  2 mg Intramuscular Q6H PRN Basilio Brown MD      hydrOXYzine HCL  25 mg Oral Q6H PRN Max 4/day Basilio Brown MD      levothyroxine  37.5 mcg Oral Early Morning WALLY Baptiste       melatonin  6 mg Oral HS PRN WALLY Gomez      methocarbamol  500 mg Oral Q6H PRN WALLY Baptiste      OLANZapine  5 mg Oral Q4H PRN Max 3/day Basilio Brown MD      Or    OLANZapine  2.5 mg Intramuscular Q4H PRN Max 3/day Basilio Brown MD      OLANZapine  5 mg Oral Q3H PRN Max 3/day Basilio Brown MD      Or    OLANZapine  5 mg Intramuscular Q3H PRN Max 3/day Basilio Brown MD      OLANZapine  2.5 mg Oral Q4H PRN Max 6/day Basilio Brown MD      polyethylene glycol  17 g Oral Daily PRN Basilio Brown MD      propranolol  10 mg Oral Q8H PRN Basilio Brown MD      senna-docusate sodium  1 tablet Oral Daily PRN Basilio Brown MD      sertraline  150 mg Oral Daily WALLY Gomez      traZODone  50 mg Oral HS PRN Basilio Brown MD         Risks / Benefits of Treatment:    Risks, benefits, and possible side effects of medications explained to patient and patient verbalizes understanding and agreement for treatment.    Subjective:    Behavior over the last 24 hours: unchanged.     The patient was evaluated this morning for continuity of care and no acute distress noted throughout the evaluation.  Over the past 24 hours staff noted the patient was quiet and withdrawn to his room.  Over the weekend he denied all symptoms.      Today on exam the patient was laying in bed and reports that he is good today.  He rates his depression and anxiety at 1 out of 10.  He is aware that he is waiting for placement to a group home and is still agreeable with this plan.  At this time he reports that he will continue to go to groups and will alert any staff if feelings of depression or anxiety returned.  He reports no questions or concerns at this time.    Sleep: normal  Appetite: normal  Medication side effects: No   ROS: no complaints    Mental Status Evaluation:    Appearance:  age appropriate, casually dressed, marginal hygiene, looks stated age  "  Behavior:  normal, pleasant, cooperative   Speech:  scant, soft   Mood:  \"Good \"   Affect:  constricted   Thought Process:  logical, goal directed   Associations: concrete associations   Thought Content:  no overt delusions   Perceptual Disturbances: no auditory hallucinations, no visual hallucinations, does not appear responding to internal stimuli   Risk Potential: Suicidal ideation - None at present  Homicidal ideation - None at present  Potential for aggression - Not at present   Sensorium:  oriented to person, place, and time/date   Memory:  recent and remote memory grossly intact   Consciousness:  alert and awake   Attention/Concentration: attention span and concentration are age appropriate   Insight:  limited   Judgment: limited   Gait/Station: normal gait/station   Motor Activity: no abnormal movements     Vital signs in last 24 hours:    Temp:  [97.2 °F (36.2 °C)-97.6 °F (36.4 °C)] 97.6 °F (36.4 °C)  HR:  [64-68] 64  Resp:  [16] 16  BP: (107-126)/(66-71) 126/71         Laboratory results: I have personally reviewed all pertinent laboratory/tests results    Most Recent Labs:   Lab Results   Component Value Date    WBC 7.01 06/27/2024    RBC 4.54 06/27/2024    HGB 14.5 06/27/2024    HCT 44.4 06/27/2024     06/27/2024    RDW 12.0 06/27/2024    NEUTROABS 4.01 06/27/2024    SODIUM 139 07/10/2024    K 3.8 07/10/2024     07/10/2024    CO2 27 07/10/2024    BUN 16 07/10/2024    CREATININE 0.92 07/10/2024    GLUC 87 07/10/2024    CALCIUM 9.3 07/10/2024    AST 28 07/10/2024    ALT 36 07/10/2024    ALKPHOS 66 07/10/2024    TP 7.7 07/10/2024    ALB 4.4 07/10/2024    TBILI 0.55 07/10/2024    CHOLESTEROL 168 09/19/2024    HDL 42 09/19/2024    TRIG 137 09/19/2024    LDLCALC 99 09/19/2024    NONHDLC 126 09/19/2024    [id number] 6.241 (H) 09/10/2024    FREET4 0.56 (L) 09/10/2024    SYPHILISAB Non-reactive 05/15/2024       Progress Toward Goals: Franco is currently assessed as being at their baseline with " continued need for medication management, supervision for safety and ADL’s. These services are not currently available in a less restrictive environment necessitating continued hospital stay.  Franco should remain on the unit until these services are available, due to likelihood of mental decompensation and readmission if discharged to an unsupervised setting.  Assertive discharge planning and collaboration with multiple providers (inpatient and community based) remains ongoing.  Franco is currently awaiting for group home placement    Counseling / Coordination of Care:    Administrative Statements   I have spent a total time of 20 minutes in caring for this patient on the day of the visit/encounter including Counseling / Coordination of care, Documenting in the medical record, and Obtaining or reviewing history  .    Lita Knapp, DO 09/23/24    This note was completed in part utilizing Dragon dictation Software. Grammatical, translation, syntax errors, random word insertions, spelling mistakes, and incomplete sentences may be an occasional consequence of this system secondary to software limitations with voice recognition, ambient noise, and hardware issues. If you have any questions or concerns about the content, text, or information contained within the body of this dictation, please contact the provider for clarification.

## 2024-09-23 NOTE — PLAN OF CARE
Problem: Ineffective Coping  Goal: Identifies ineffective coping skills  Outcome: Progressing  Goal: Identifies healthy coping skills  Outcome: Progressing  Goal: Demonstrates healthy coping skills  Outcome: Progressing  Goal: Participates in unit activities  Description: Interventions:  - Provide therapeutic environment   - Provide required programming   - Redirect inappropriate behaviors   Outcome: Progressing     Problem: Risk for Self Injury/Neglect  Goal: Treatment Goal: Remain safe during length of stay, learn and adopt new coping skills, and be free of self-injurious ideation, impulses and acts at the time of discharge  Outcome: Progressing     Problem: Depression  Goal: Treatment Goal: Demonstrate behavioral control of depressive symptoms, verbalize feelings of improved mood/affect, and adopt new coping skills prior to discharge  Outcome: Progressing  Goal: Verbalize thoughts and feelings  Description: Interventions:  - Assess and re-assess patient's level of risk   - Engage patient in 1:1 interactions, daily, for a minimum of 15 minutes   - Encourage patient to express feelings, fears, frustrations, hopes   Outcome: Progressing  Goal: Refrain from harming self  Description: Interventions:  - Monitor patient closely, per order   - Supervise medication ingestion, monitor effects and side effects   Outcome: Progressing  Goal: Refrain from isolation  Description: Interventions:  - Develop a trusting relationship   - Encourage socialization   Outcome: Progressing  Goal: Refrain from self-neglect  Outcome: Progressing  Goal: Attend and participate in unit activities, including therapeutic, recreational, and educational groups  Description: Interventions:  - Provide therapeutic and educational activities daily, encourage attendance and participation, and document same in the medical record   Outcome: Progressing  Goal: Complete daily ADLs, including personal hygiene independently, as able  Description:  Interventions:  - Observe, teach, and assist patient with ADLS  -  Monitor and promote a balance of rest/activity, with adequate nutrition and elimination   Outcome: Progressing     Problem: Anxiety  Goal: Anxiety is at manageable level  Description: Interventions:  - Assess and monitor patient's anxiety level.   - Monitor for signs and symptoms (heart palpitations, chest pain, shortness of breath, headaches, nausea, feeling jumpy, restlessness, irritable, apprehensive).   - Collaborate with interdisciplinary team and initiate plan and interventions as ordered.  - [place] patient to unit/surroundings  - Explain treatment plan  - Encourage participation in care  - Encourage verbalization of concerns/fears  - Identify coping mechanisms  - Assist in developing anxiety-reducing skills  - Administer/offer alternative therapies  - Limit or eliminate stimulants  Outcome: Progressing     Problem: DISCHARGE PLANNING - CARE MANAGEMENT  Goal: Discharge to post-acute care or home with appropriate resources  Description: INTERVENTIONS:  - Conduct assessment to determine patient/family and health care team treatment goals, and need for post-acute services based on payer coverage, community resources, and patient preferences, and barriers to discharge  - Address psychosocial, clinical, and financial barriers to discharge as identified in assessment in conjunction with the patient/family and health care team  - Arrange appropriate level of post-acute services according to patient’s   needs and preference and payer coverage in collaboration with the physician and health care team  - Communicate with and update the patient/family, physician, and health care team regarding progress on the discharge plan  - Arrange appropriate transportation to post-acute venues  Outcome: Progressing     Problem: Knowledge Deficit  Goal: Patient/family/caregiver demonstrates understanding of disease process, treatment plan, medications, and discharge  instructions  Description: Complete learning assessment and assess knowledge base.  Interventions:  - Provide teaching at level of understanding  - Provide teaching via preferred learning methods  Outcome: Progressing     Problem: SLEEP DISTURBANCE  Goal: Will exhibit normal sleeping pattern  Description: Interventions:  -  Assess the patients sleep pattern, noting recent changes  - Administer medication as ordered  - Decrease environmental stimuli, including noise, as appropriate during the night  - Encourage the patient to actively participate in unit groups and or exercise during the day to enhance ability to achieve adequate sleep at night  - Assess the patient, in the morning, encouraging a description of sleep experience  Outcome: Progressing

## 2024-09-23 NOTE — PROGRESS NOTES
09/23/24 0859   Team Meeting   Meeting Type Daily Rounds   Team Members Present   Team Members Present Physician;Nurse;   Physician Team Member Aria   [facility] Suhas   Patient/Family Present   Patient Present No   Patient's Family Present No     Pt med/meal compliant. Visible on the unit. Pleasant, calm, cooperative. Discharge pending placement.

## 2024-09-24 PROCEDURE — 99232 SBSQ HOSP IP/OBS MODERATE 35: CPT | Performed by: PSYCHIATRY & NEUROLOGY

## 2024-09-24 RX ADMIN — FAMOTIDINE 20 MG: 20 TABLET ORAL at 17:11

## 2024-09-24 RX ADMIN — ARIPIPRAZOLE 5 MG: 5 TABLET ORAL at 08:19

## 2024-09-24 RX ADMIN — CYANOCOBALAMIN TAB 1000 MCG 1000 MCG: 1000 TAB at 08:19

## 2024-09-24 RX ADMIN — LEVOTHYROXINE SODIUM 37.5 MCG: 125 TABLET ORAL at 06:25

## 2024-09-24 RX ADMIN — CHOLECALCIFEROL TAB 25 MCG (1000 UNIT) 2000 UNITS: 25 TAB at 08:19

## 2024-09-24 RX ADMIN — FAMOTIDINE 20 MG: 20 TABLET ORAL at 08:19

## 2024-09-24 RX ADMIN — SERTRALINE HYDROCHLORIDE 150 MG: 100 TABLET ORAL at 08:19

## 2024-09-24 RX ADMIN — ATORVASTATIN CALCIUM 10 MG: 10 TABLET, FILM COATED ORAL at 17:11

## 2024-09-24 NOTE — PROGRESS NOTES
09/24/24 0856   Team Meeting   Meeting Type Daily Rounds   Team Members Present   Team Members Present Physician;Nurse;   Physician Team Member Aria   [facility]   Care Management Team Member Noa   Patient/Family Present   Patient Present No   Patient's Family Present No     Pt is calm and cooperative. Pt is medication and po compliant. Pt is social with select peers and staff. Pt is awaiting placement in group home.

## 2024-09-24 NOTE — PROGRESS NOTES
9/23/24. 9:00 pm. Patient is visible on the unit, withdrawn to self.  He is med/meal compliant.  Patient denies depression but appears sad, depressed.  He is pleasant and calm.

## 2024-09-24 NOTE — PLAN OF CARE
Problem: Ineffective Coping  Goal: Identifies ineffective coping skills  Outcome: Progressing  Goal: Identifies healthy coping skills  Outcome: Progressing     Problem: Risk for Self Injury/Neglect  Goal: Treatment Goal: Remain safe during length of stay, learn and adopt new coping skills, and be free of self-injurious ideation, impulses and acts at the time of discharge  Outcome: Progressing     Problem: Depression  Goal: Treatment Goal: Demonstrate behavioral control of depressive symptoms, verbalize feelings of improved mood/affect, and adopt new coping skills prior to discharge  Outcome: Progressing  Goal: Verbalize thoughts and feelings  Description: Interventions:  - Assess and re-assess patient's level of risk   - Engage patient in 1:1 interactions, daily, for a minimum of 15 minutes   - Encourage patient to express feelings, fears, frustrations, hopes   Outcome: Progressing  Goal: Refrain from harming self  Description: Interventions:  - Monitor patient closely, per order   - Supervise medication ingestion, monitor effects and side effects   Outcome: Progressing  Goal: Attend and participate in unit activities, including therapeutic, recreational, and educational groups  Description: Interventions:  - Provide therapeutic and educational activities daily, encourage attendance and participation, and document same in the medical record   Outcome: Progressing     Problem: Anxiety  Goal: Anxiety is at manageable level  Description: Interventions:  - Assess and monitor patient's anxiety level.   - Monitor for signs and symptoms (heart palpitations, chest pain, shortness of breath, headaches, nausea, feeling jumpy, restlessness, irritable, apprehensive).   - Collaborate with interdisciplinary team and initiate plan and interventions as ordered.  - [place] patient to unit/surroundings  - Explain treatment plan  - Encourage participation in care  - Encourage verbalization of concerns/fears  - Identify coping  mechanisms  - Assist in developing anxiety-reducing skills  - Administer/offer alternative therapies  - Limit or eliminate stimulants  Outcome: Progressing     Problem: DISCHARGE PLANNING - CARE MANAGEMENT  Goal: Discharge to post-acute care or home with appropriate resources  Description: INTERVENTIONS:  - Conduct assessment to determine patient/family and health care team treatment goals, and need for post-acute services based on payer coverage, community resources, and patient preferences, and barriers to discharge  - Address psychosocial, clinical, and financial barriers to discharge as identified in assessment in conjunction with the patient/family and health care team  - Arrange appropriate level of post-acute services according to patient’s   needs and preference and payer coverage in collaboration with the physician and health care team  - Communicate with and update the patient/family, physician, and health care team regarding progress on the discharge plan  - Arrange appropriate transportation to post-acute venues  Outcome: Progressing     Problem: Knowledge Deficit  Goal: Patient/family/caregiver demonstrates understanding of disease process, treatment plan, medications, and discharge instructions  Description: Complete learning assessment and assess knowledge base.  Interventions:  - Provide teaching at level of understanding  - Provide teaching via preferred learning methods  Outcome: Progressing     Problem: SLEEP DISTURBANCE  Goal: Will exhibit normal sleeping pattern  Description: Interventions:  -  Assess the patients sleep pattern, noting recent changes  - Administer medication as ordered  - Decrease environmental stimuli, including noise, as appropriate during the night  - Encourage the patient to actively participate in unit groups and or exercise during the day to enhance ability to achieve adequate sleep at night  - Assess the patient, in the morning, encouraging a description of sleep  experience  Outcome: Progressing     Problem: Ineffective Coping  Goal: Demonstrates healthy coping skills  Outcome: Not Progressing  Goal: Participates in unit activities  Description: Interventions:  - Provide therapeutic environment   - Provide required programming   - Redirect inappropriate behaviors   Outcome: Not Progressing     Problem: Depression  Goal: Refrain from isolation  Description: Interventions:  - Develop a trusting relationship   - Encourage socialization   Outcome: Not Progressing  Goal: Refrain from self-neglect  Outcome: Not Progressing  Goal: Complete daily ADLs, including personal hygiene independently, as able  Description: Interventions:  - Observe, teach, and assist patient with ADLS  -  Monitor and promote a balance of rest/activity, with adequate nutrition and elimination   Outcome: Not Progressing

## 2024-09-24 NOTE — PROGRESS NOTES
Progress Note - Behavioral Health   Name: Franco Roberson 39 y.o. male I MRN: [id number]  Unit/Bed#: -02 I Date of Admission: 5/14/2024   Date of Service: 9/24/2024 I Hospital Day: 133     Assessment & Plan  MDD (major depressive disorder), recurrent severe, without psychosis (HCC)  -Continue Abilify 5mg Daily  -Continue Sertraline 150mg Daily     Autism spectrum disorder      Current medications:  Current Facility-Administered Medications   Medication Dose Route Frequency Provider Last Rate    acetaminophen  650 mg Oral Q6H PRN Basilio Brown MD      acetaminophen  650 mg Oral Q4H PRN Basilio Brown MD      acetaminophen  975 mg Oral Q6H PRN Basilio Brown MD      aluminum-magnesium hydroxide-simethicone  30 mL Oral Q4H PRN Basilio Brown MD      ARIPiprazole  5 mg Oral Daily Basilio Panda MD      Artificial Tears  1 drop Both Eyes Q3H PRN Basilio Brown MD      atorvastatin  10 mg Oral Daily With Dinner WALLY Baptiste      benztropine  1 mg Intramuscular Q4H PRN Max 6/day Basilio Brown MD      benztropine  1 mg Oral Q4H PRN Max 6/day Basilio Brown MD      Cholecalciferol  2,000 Units Oral Daily WALLY Baptiste      cyanocobalamin  1,000 mcg Oral Daily WALLY Baptiste      Diclofenac Sodium  2 g Topical 4x Daily PRN WALLY Baptiste      hydrOXYzine HCL  50 mg Oral Q6H PRN Max 4/day Basilio Brown MD      Or    diphenhydrAMINE  50 mg Intramuscular Q6H PRN Basilio Brown MD      diphenhydrAMINE-zinc acetate   Topical Daily PRN Raj Guerrero MD      famotidine  20 mg Oral BID WALLY Baptiste      hydrOXYzine HCL  100 mg Oral Q6H PRN Max 4/day Basilio Brown MD      Or    LORazepam  2 mg Intramuscular Q6H PRN Basilio Brown MD      hydrOXYzine HCL  25 mg Oral Q6H PRN Max 4/day Basilio Brown MD      levothyroxine  37.5 mcg Oral Early Morning WALLY Baptiste       "melatonin  6 mg Oral HS PRN WALLY Gomez      methocarbamol  500 mg Oral Q6H PRN WALLY Baptiste      OLANZapine  5 mg Oral Q4H PRN Max 3/day Basilio Brown MD      Or    OLANZapine  2.5 mg Intramuscular Q4H PRN Max 3/day Basilio Brown MD      OLANZapine  5 mg Oral Q3H PRN Max 3/day Basilio Brown MD      Or    OLANZapine  5 mg Intramuscular Q3H PRN Max 3/day Basilio Brown MD      OLANZapine  2.5 mg Oral Q4H PRN Max 6/day Basilio Brown MD      polyethylene glycol  17 g Oral Daily PRN Basilio Brown MD      propranolol  10 mg Oral Q8H PRN Basilio Brown MD      senna-docusate sodium  1 tablet Oral Daily PRN Basilio Brown MD      sertraline  150 mg Oral Daily WALLY Gomez      traZODone  50 mg Oral HS PRN Basilio Brown MD         Risks / Benefits of Treatment:    Risks, benefits, and possible side effects of medications explained to patient and patient verbalizes understanding and agreement for treatment.    Subjective:    Behavior over the last 24 hours: unchanged.     The patient was evaluated this morning for continuity of care and no acute distress noted throughout the evaluation.  Over the past 24 hours staff noted the patient was withdrawn, but remains pleasant and does leave groups for certain groups.      Today on exam the patient was laying in bed and reports that he is a bit \"tired,\" today. He reports staying awake until 2-3 am but was able to sleep uninterrupted until 8AM. He reports that this is rare occurrence and he would like like any medication for sleep. Continues to deny SI and reports feeling safe on the unit.    Sleep: normal  Appetite: normal  Medication side effects: No   ROS: no complaints    Mental Status Evaluation:    Appearance:  age appropriate, casually dressed, marginal hygiene   Behavior:  pleasant, cooperative, limited eye contact   Speech:  scant, soft   Mood:  \"Good\"   Affect:  constricted "   Thought Process:  logical, coherent, goal directed   Associations: intact associations   Thought Content:  no overt delusions   Perceptual Disturbances: no auditory hallucinations, no visual hallucinations, does not appear responding to internal stimuli   Risk Potential: Suicidal ideation - None at present  Homicidal ideation - None at present  Potential for aggression - Not at present   Sensorium:  oriented to person, place, and time/date   Memory:  recent and remote memory grossly intact   Consciousness:  alert and awake   Attention/Concentration: attention span and concentration are age appropriate   Insight:  limited   Judgment: limited   Gait/Station: normal gait/station   Motor Activity: no abnormal movements     Vital signs in last 24 hours:    Temp:  [97.1 °F (36.2 °C)-97.6 °F (36.4 °C)] 97.6 °F (36.4 °C)  HR:  [56-64] 56  Resp:  [16] 16  BP: (125-139)/(71-81) 139/81         Laboratory results: I have personally reviewed all pertinent laboratory/tests results    Most Recent Labs:   Lab Results   Component Value Date    WBC 7.01 06/27/2024    RBC 4.54 06/27/2024    HGB 14.5 06/27/2024    HCT 44.4 06/27/2024     06/27/2024    RDW 12.0 06/27/2024    NEUTROABS 4.01 06/27/2024    SODIUM 139 07/10/2024    K 3.8 07/10/2024     07/10/2024    CO2 27 07/10/2024    BUN 16 07/10/2024    CREATININE 0.92 07/10/2024    GLUC 87 07/10/2024    CALCIUM 9.3 07/10/2024    AST 28 07/10/2024    ALT 36 07/10/2024    ALKPHOS 66 07/10/2024    TP 7.7 07/10/2024    ALB 4.4 07/10/2024    TBILI 0.55 07/10/2024    CHOLESTEROL 168 09/19/2024    HDL 42 09/19/2024    TRIG 137 09/19/2024    LDLCALC 99 09/19/2024    NONHDLC 126 09/19/2024    WXZ3BIZVDEVA 6.241 (H) 09/10/2024    FREET4 0.56 (L) 09/10/2024    SYPHILISAB Non-reactive 05/15/2024       Progress Toward Goals: Franco is currently assessed as being at their baseline with continued need for medication management, supervision for safety and ADL’s. These services are not  currently available in a less restrictive environment necessitating continued hospital stay.  Franco should remain on the unit until these services are available, due to likelihood of mental decompensation and readmission if discharged to an unsupervised setting.  Assertive discharge planning and collaboration with multiple providers (inpatient and community based) remains ongoing.  Franco is currently awaiting for goup home placement.      Counseling / Coordination of Care:    Administrative Statements   I have spent a total time of 20 minutes in caring for this patient on the day of the visit/encounter including Documenting in the medical record and Obtaining or reviewing history  .    Lita Knapp,  09/24/24    This note was completed in part utilizing Dragon dictation Software. Grammatical, translation, syntax errors, random word insertions, spelling mistakes, and incomplete sentences may be an occasional consequence of this system secondary to software limitations with voice recognition, ambient noise, and hardware issues. If you have any questions or concerns about the content, text, or information contained within the body of this dictation, please contact the provider for clarification.

## 2024-09-24 NOTE — SOCIAL WORK
Cm called [facility] and followed up from Cm's phone call. They reported they cannot take Pt due to not having social security income. They reported they are a private facility and do not accept government funding.  Cm called [facility] and they did not answer. Cm left a voicemail with updated call back information.    Cm called [facility], They reported they are considered a SNF and cannot accept Pt at this time. They gave Cm the resource called [facility] and phone number.    Cm called [facility] [phone]. Cm left call back information.    Cm called [facility] to follow up if they have any further resources for Pt, since they have denied him due to not having income. Cm left a voicemail with call back number.

## 2024-09-24 NOTE — NURSING NOTE
Pt denies SI/HI/AH/VH. Isoaltive to room and self this morning. Medication and meal compliant. Pt is calm and pleasant.Pt more visible later in the Morning. Pt compliant with Lunch. Scant with communication. No further concerns as of present. Plan of care ongoing.

## 2024-09-25 PROCEDURE — 99232 SBSQ HOSP IP/OBS MODERATE 35: CPT | Performed by: PSYCHIATRY & NEUROLOGY

## 2024-09-25 RX ADMIN — ARIPIPRAZOLE 5 MG: 5 TABLET ORAL at 08:35

## 2024-09-25 RX ADMIN — CYANOCOBALAMIN TAB 1000 MCG 1000 MCG: 1000 TAB at 08:35

## 2024-09-25 RX ADMIN — ATORVASTATIN CALCIUM 10 MG: 10 TABLET, FILM COATED ORAL at 17:23

## 2024-09-25 RX ADMIN — FAMOTIDINE 20 MG: 20 TABLET ORAL at 08:35

## 2024-09-25 RX ADMIN — CHOLECALCIFEROL TAB 25 MCG (1000 UNIT) 2000 UNITS: 25 TAB at 08:35

## 2024-09-25 RX ADMIN — FAMOTIDINE 20 MG: 20 TABLET ORAL at 17:22

## 2024-09-25 RX ADMIN — LEVOTHYROXINE SODIUM 37.5 MCG: 125 TABLET ORAL at 06:22

## 2024-09-25 RX ADMIN — SERTRALINE HYDROCHLORIDE 150 MG: 100 TABLET ORAL at 08:34

## 2024-09-25 NOTE — NURSING NOTE
Pt verbally denies SI, HI and A/V hallucinations. Came up for breakfast with flat affect and minimal eye contact. Constricted upon conversation made with RN. Answered assessment questions appropriately and expressed no new concerns. Effective behavioral management. Peer to peer interactions are limited but sufficient. Compliant with meds.

## 2024-09-25 NOTE — NURSING NOTE
Patient quiet, calm, behaviors controlled this evening. Patient offered no complaints, isolative to self, visible at times. Will continue to monitor.

## 2024-09-25 NOTE — PROGRESS NOTES
09/25/24 0852   Team Meeting   Meeting Type Daily Rounds   Team Members Present   Team Members Present Physician;Nurse;   Physician Team Member Aria   [facility] Suhas   Patient/Family Present   Patient Present No   Patient's Family Present No     Pt med/meal compliant. Visible on the unit, calm, pleasant, cooperative. Discharge pending placement.

## 2024-09-25 NOTE — PROGRESS NOTES
Progress Note - Behavioral Health   Name: Franco Roberson 39 y.o. male I MRN: [id number]  Unit/Bed#: -02 I Date of Admission: 5/14/2024   Date of Service: 9/25/2024 I Hospital Day: 134     Assessment & Plan  MDD (major depressive disorder), recurrent severe, without psychosis (HCC)  -Continue Abilify 5mg Daily  -Continue Sertraline 150mg Daily     Autism spectrum disorder      Current medications:  Current Facility-Administered Medications   Medication Dose Route Frequency Provider Last Rate    acetaminophen  650 mg Oral Q6H PRN Basilio Brown MD      acetaminophen  650 mg Oral Q4H PRN Basilio Brown MD      acetaminophen  975 mg Oral Q6H PRN Basilio Brown MD      aluminum-magnesium hydroxide-simethicone  30 mL Oral Q4H PRN Basilio Brown MD      ARIPiprazole  5 mg Oral Daily aBsilio Panda MD      Artificial Tears  1 drop Both Eyes Q3H PRN Basilio Brown MD      atorvastatin  10 mg Oral Daily With Dinner WALLY Baptiste      benztropine  1 mg Intramuscular Q4H PRN Max 6/day Basilio Brown MD      benztropine  1 mg Oral Q4H PRN Max 6/day Basilio Brown MD      Cholecalciferol  2,000 Units Oral Daily WALLY Baptiste      cyanocobalamin  1,000 mcg Oral Daily WALLY Baptiste      Diclofenac Sodium  2 g Topical 4x Daily PRN WALLY Baptiste      hydrOXYzine HCL  50 mg Oral Q6H PRN Max 4/day Basilio Brown MD      Or    diphenhydrAMINE  50 mg Intramuscular Q6H PRN Basilio Brown MD      diphenhydrAMINE-zinc acetate   Topical Daily PRN Raj Guerrero MD      famotidine  20 mg Oral BID WALLY Baptiste      hydrOXYzine HCL  100 mg Oral Q6H PRN Max 4/day Basilio Brown MD      Or    LORazepam  2 mg Intramuscular Q6H PRN Basilio Brown MD      hydrOXYzine HCL  25 mg Oral Q6H PRN Max 4/day Basilio Brown MD      levothyroxine  37.5 mcg Oral Early Morning WALLY Baptiste       melatonin  6 mg Oral HS PRN WALLY Gomez      methocarbamol  500 mg Oral Q6H PRN WALLY Baptiste      OLANZapine  5 mg Oral Q4H PRN Max 3/day Basilio Brown MD      Or    OLANZapine  2.5 mg Intramuscular Q4H PRN Max 3/day Basilio Brown MD      OLANZapine  5 mg Oral Q3H PRN Max 3/day Basilio Brown MD      Or    OLANZapine  5 mg Intramuscular Q3H PRN Max 3/day Basilio Brown MD      OLANZapine  2.5 mg Oral Q4H PRN Max 6/day Basilio Brown MD      polyethylene glycol  17 g Oral Daily PRN Basilio Brown MD      propranolol  10 mg Oral Q8H PRN Basilio Brown MD      senna-docusate sodium  1 tablet Oral Daily PRN Basilio Brown MD      sertraline  150 mg Oral Daily WALLY Gomez      traZODone  50 mg Oral HS PRN Basilio Brown MD         Risks / Benefits of Treatment:    Risks, benefits, and possible side effects of medications explained to patient and patient verbalizes understanding and agreement for treatment.    Subjective:    Behavior over the last 24 hours: unchanged.     The patient was evaluated this morning for continuity of care and no acute distress noted throughout the evaluation.  Over the past 24 hours staff noted the patient was quiet, calm and following unit rules.      Today on exam the patient was sitting in bed and reports that he is doing well today.  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  He states his mood is good, he has no issues with medications at this time.    Sleep: normal  Appetite: normal  Medication side effects: No   ROS: no complaints    Mental Status Evaluation:    Appearance:  age appropriate, marginal hygiene, looks stated age   Behavior:  pleasant, cooperative   Speech:  normal rate and volume   Mood:  Good   Affect:  constricted   Thought Process:  logical, goal directed   Associations: intact associations   Thought Content:  no overt delusions   Perceptual  Disturbances: no auditory hallucinations, no visual hallucinations, does not appear responding to internal stimuli   Risk Potential: Suicidal ideation - None at present  Homicidal ideation - None at present  Potential for aggression - No   Sensorium:  oriented to person, place, and time/date   Memory:  recent and remote memory grossly intact   Consciousness:  alert and awake   Attention/Concentration: attention span and concentration are age appropriate   Insight:  limited   Judgment: limited   Gait/Station: normal gait/station   Motor Activity: no abnormal movements     Vital signs in last 24 hours:    Temp:  [96.4 °F (35.8 °C)-97.5 °F (36.4 °C)] 96.4 °F (35.8 °C)  HR:  [74-79] 74  Resp:  [16-17] 17  BP: (103-104)/(59-66) 103/59         Laboratory results: I have personally reviewed all pertinent laboratory/tests results    Most Recent Labs:   Lab Results   Component Value Date    WBC 7.01 06/27/2024    RBC 4.54 06/27/2024    HGB 14.5 06/27/2024    HCT 44.4 06/27/2024     06/27/2024    RDW 12.0 06/27/2024    NEUTROABS 4.01 06/27/2024    SODIUM 139 07/10/2024    K 3.8 07/10/2024     07/10/2024    CO2 27 07/10/2024    BUN 16 07/10/2024    CREATININE 0.92 07/10/2024    GLUC 87 07/10/2024    CALCIUM 9.3 07/10/2024    AST 28 07/10/2024    ALT 36 07/10/2024    ALKPHOS 66 07/10/2024    TP 7.7 07/10/2024    ALB 4.4 07/10/2024    TBILI 0.55 07/10/2024    CHOLESTEROL 168 09/19/2024    HDL 42 09/19/2024    TRIG 137 09/19/2024    LDLCALC 99 09/19/2024    NONHDLC 126 09/19/2024    [id number] 6.241 (H) 09/10/2024    FREET4 0.56 (L) 09/10/2024    SYPHILISAB Non-reactive 05/15/2024       Progress Toward Goals: Franco is currently assessed as being at their baseline with continued need for medication management, supervision for safety and ADL’s. These services are not currently available in a less restrictive environment necessitating continued hospital stay.  Franco should remain on the unit until these services are  available, due to likelihood of mental decompensation and readmission if discharged to an unsupervised setting.  Assertive discharge planning and collaboration with multiple providers (inpatient and community based) remains ongoing.  Franco is currently awaiting for group home placement    Counseling / Coordination of Care:    Administrative Statements   I have spent a total time of 20 minutes in caring for this patient on the day of the visit/encounter including Counseling / Coordination of care, Documenting in the medical record, and Obtaining or reviewing history  .    Lita Knapp,  09/25/24    This note was completed in part utilizing Dragon dictation Software. Grammatical, translation, syntax errors, random word insertions, spelling mistakes, and incomplete sentences may be an occasional consequence of this system secondary to software limitations with voice recognition, ambient noise, and hardware issues. If you have any questions or concerns about the content, text, or information contained within the body of this dictation, please contact the provider for clarification.

## 2024-09-25 NOTE — PLAN OF CARE
Problem: Ineffective Coping  Goal: Identifies ineffective coping skills  Outcome: Progressing  Goal: Identifies healthy coping skills  Outcome: Progressing  Goal: Demonstrates healthy coping skills  Outcome: Progressing  Goal: Participates in unit activities  Description: Interventions:  - Provide therapeutic environment   - Provide required programming   - Redirect inappropriate behaviors   Outcome: Progressing     Problem: Risk for Self Injury/Neglect  Goal: Treatment Goal: Remain safe during length of stay, learn and adopt new coping skills, and be free of self-injurious ideation, impulses and acts at the time of discharge  Outcome: Progressing     Problem: Depression  Goal: Treatment Goal: Demonstrate behavioral control of depressive symptoms, verbalize feelings of improved mood/affect, and adopt new coping skills prior to discharge  Outcome: Progressing  Goal: Verbalize thoughts and feelings  Description: Interventions:  - Assess and re-assess patient's level of risk   - Engage patient in 1:1 interactions, daily, for a minimum of 15 minutes   - Encourage patient to express feelings, fears, frustrations, hopes   Outcome: Progressing  Goal: Refrain from harming self  Description: Interventions:  - Monitor patient closely, per order   - Supervise medication ingestion, monitor effects and side effects   Outcome: Progressing  Goal: Attend and participate in unit activities, including therapeutic, recreational, and educational groups  Description: Interventions:  - Provide therapeutic and educational activities daily, encourage attendance and participation, and document same in the medical record   Outcome: Progressing     Problem: Anxiety  Goal: Anxiety is at manageable level  Description: Interventions:  - Assess and monitor patient's anxiety level.   - Monitor for signs and symptoms (heart palpitations, chest pain, shortness of breath, headaches, nausea, feeling jumpy, restlessness, irritable, apprehensive).   -  Collaborate with interdisciplinary team and initiate plan and interventions as ordered.  - [place] patient to unit/surroundings  - Explain treatment plan  - Encourage participation in care  - Encourage verbalization of concerns/fears  - Identify coping mechanisms  - Assist in developing anxiety-reducing skills  - Administer/offer alternative therapies  - Limit or eliminate stimulants  Outcome: Progressing     Problem: DISCHARGE PLANNING - CARE MANAGEMENT  Goal: Discharge to post-acute care or home with appropriate resources  Description: INTERVENTIONS:  - Conduct assessment to determine patient/family and health care team treatment goals, and need for post-acute services based on payer coverage, community resources, and patient preferences, and barriers to discharge  - Address psychosocial, clinical, and financial barriers to discharge as identified in assessment in conjunction with the patient/family and health care team  - Arrange appropriate level of post-acute services according to patient’s   needs and preference and payer coverage in collaboration with the physician and health care team  - Communicate with and update the patient/family, physician, and health care team regarding progress on the discharge plan  - Arrange appropriate transportation to post-acute venues  Outcome: Progressing     Problem: Knowledge Deficit  Goal: Patient/family/caregiver demonstrates understanding of disease process, treatment plan, medications, and discharge instructions  Description: Complete learning assessment and assess knowledge base.  Interventions:  - Provide teaching at level of understanding  - Provide teaching via preferred learning methods  Outcome: Progressing     Problem: Depression  Goal: Refrain from isolation  Description: Interventions:  - Develop a trusting relationship   - Encourage socialization   Outcome: Not Progressing  Goal: Refrain from self-neglect  Outcome: Not Progressing  Goal: Complete daily ADLs,  including personal hygiene independently, as able  Description: Interventions:  - Observe, teach, and assist patient with ADLS  -  Monitor and promote a balance of rest/activity, with adequate nutrition and elimination   Outcome: Not Progressing     Problem: SLEEP DISTURBANCE  Goal: Will exhibit normal sleeping pattern  Description: Interventions:  -  Assess the patients sleep pattern, noting recent changes  - Administer medication as ordered  - Decrease environmental stimuli, including noise, as appropriate during the night  - Encourage the patient to actively participate in unit groups and or exercise during the day to enhance ability to achieve adequate sleep at night  - Assess the patient, in the morning, encouraging a description of sleep experience  Outcome: Not Progressing

## 2024-09-26 PROCEDURE — 99232 SBSQ HOSP IP/OBS MODERATE 35: CPT | Performed by: PSYCHIATRY & NEUROLOGY

## 2024-09-26 RX ADMIN — ARIPIPRAZOLE 5 MG: 5 TABLET ORAL at 08:08

## 2024-09-26 RX ADMIN — SERTRALINE HYDROCHLORIDE 150 MG: 100 TABLET ORAL at 08:08

## 2024-09-26 RX ADMIN — ATORVASTATIN CALCIUM 10 MG: 10 TABLET, FILM COATED ORAL at 17:25

## 2024-09-26 RX ADMIN — CHOLECALCIFEROL TAB 25 MCG (1000 UNIT) 2000 UNITS: 25 TAB at 08:08

## 2024-09-26 RX ADMIN — FAMOTIDINE 20 MG: 20 TABLET ORAL at 08:08

## 2024-09-26 RX ADMIN — CYANOCOBALAMIN TAB 1000 MCG 1000 MCG: 1000 TAB at 08:08

## 2024-09-26 RX ADMIN — FAMOTIDINE 20 MG: 20 TABLET ORAL at 17:25

## 2024-09-26 RX ADMIN — LEVOTHYROXINE SODIUM 37.5 MCG: 125 TABLET ORAL at 06:21

## 2024-09-26 NOTE — PROGRESS NOTES
09/26/24 0843   Team Meeting   Meeting Type Daily Rounds   Team Members Present   Team Members Present Physician;Nurse;   Physician Team Member Aria   [facility]   Care Management Team Member Noa   Patient/Family Present   Patient Present No   Patient's Family Present No     Pt is medication and meal compliant. Pt is social with select peers and staff. Pt's discharge is pending group home.

## 2024-09-26 NOTE — PROGRESS NOTES
Progress Note - Behavioral Health   Name: Franco Roberson 39 y.o. male I MRN: [id number]  Unit/Bed#: -02 I Date of Admission: 5/14/2024   Date of Service: 9/26/2024 I Hospital Day: 135    Assessment & Plan  MDD (major depressive disorder), recurrent severe, without psychosis (HCC)  -Continue Abilify 5mg Daily  -Continue Sertraline 150mg Daily     Autism spectrum disorder      Progress Toward Goals: Franco is currently assessed as being at their baseline with continued need for medication management, supervision for safety and ADL's. These services are not currently available in a less restrictive environment necessitating continued hospital stay.  Franco should remain on the unit until these services are available, due to likelihood of mental decompensation and readmission if discharged to an unsupervised setting.  Assertive discharge planning and collaboration with multiple providers (inpatient and community based) remains ongoing.  Franco is currently awaiting for group home placement.     Recommended Treatment: Continue with group therapy, milieu therapy and occupational therapy.      Risks, benefits and possible side effects of Medications:   Risks, benefits, and possible side effects of medications explained to patient and patient verbalizes understanding.      History of Present Illness   Behavior over the last 24 hours:  unchanged  Sleep: normal  Appetite: normal  Medication side effects: No  ROS: no complaints    Subjective: Patient seen in room lying in bed.  He is pleasant calm and cooperative with writer.  He is scant in conversation but rather childlike also.  He reports he is doing much better.  Patient reports he is waiting to be discharged but does not know where that is going to be yet.  He reports eating and sleeping well.  He is medication compliant    Objective   Mental Status Evaluation:  Appearance:  age appropriate and casually dressed   Behavior:  Pleasant calm and cooperative   Speech:   normal pitch and normal volume   Mood:  euthymic   Affect:  constricted   Thought Process:  goal directed   Thought Content:  No overt delusions or paranoia verbalized   Perceptual Disturbances: Patient denies and does not appear to be internally preoccupied   Risk Potential: Suicidal Ideations none  Homicidal Ideations none  Potential for Aggression No   Sensorium:  person, place, and time/date   Memory:  recent and remote memory grossly intact   Consciousness:  alert and awake    Attention: attention span appeared shorter than expected for age   Insight:  limited   Judgment: limited   Gait/Station: Patient in bed   Motor Activity: no abnormal movements     Medications: all current active meds have been reviewed.      Lab Results: I have reviewed the following results: CBC/BMP: No new results in last 24 hours.   Most Recent Labs:   Lab Results   Component Value Date    WBC 7.01 06/27/2024    RBC 4.54 06/27/2024    HGB 14.5 06/27/2024    HCT 44.4 06/27/2024     06/27/2024    RDW 12.0 06/27/2024    NEUTROABS 4.01 06/27/2024    SODIUM 139 07/10/2024    K 3.8 07/10/2024     07/10/2024    CO2 27 07/10/2024    BUN 16 07/10/2024    CREATININE 0.92 07/10/2024    GLUC 87 07/10/2024    GLUF 87 07/10/2024    CALCIUM 9.3 07/10/2024    AST 28 07/10/2024    ALT 36 07/10/2024    ALKPHOS 66 07/10/2024    TP 7.7 07/10/2024    ALB 4.4 07/10/2024    TBILI 0.55 07/10/2024    CHOLESTEROL 168 09/19/2024    HDL 42 09/19/2024    TRIG 137 09/19/2024    LDLCALC 99 09/19/2024    NONHDLC 126 09/19/2024    QTA8CCEQQWWH 6.241 (H) 09/10/2024    FREET4 0.56 (L) 09/10/2024

## 2024-09-26 NOTE — PLAN OF CARE
Problem: Risk for Self Injury/Neglect  Goal: Treatment Goal: Remain safe during length of stay, learn and adopt new coping skills, and be free of self-injurious ideation, impulses and acts at the time of discharge  Outcome: Progressing     Problem: Anxiety  Goal: Anxiety is at manageable level  Description: Interventions:  - Assess and monitor patient's anxiety level.   - Monitor for signs and symptoms (heart palpitations, chest pain, shortness of breath, headaches, nausea, feeling jumpy, restlessness, irritable, apprehensive).   - Collaborate with interdisciplinary team and initiate plan and interventions as ordered.  - [place] patient to unit/surroundings  - Explain treatment plan  - Encourage participation in care  - Encourage verbalization of concerns/fears  - Identify coping mechanisms  - Assist in developing anxiety-reducing skills  - Administer/offer alternative therapies  - Limit or eliminate stimulants  Outcome: Progressing     Problem: SLEEP DISTURBANCE  Goal: Will exhibit normal sleeping pattern  Description: Interventions:  -  Assess the patients sleep pattern, noting recent changes  - Administer medication as ordered  - Decrease environmental stimuli, including noise, as appropriate during the night  - Encourage the patient to actively participate in unit groups and or exercise during the day to enhance ability to achieve adequate sleep at night  - Assess the patient, in the morning, encouraging a description of sleep experience  Outcome: Progressing     Problem: Ineffective Coping  Goal: Identifies ineffective coping skills  Outcome: Not Progressing  Goal: Participates in unit activities  Description: Interventions:  - Provide therapeutic environment   - Provide required programming   - Redirect inappropriate behaviors   Outcome: Not Progressing     Problem: Depression  Goal: Treatment Goal: Demonstrate behavioral control of depressive symptoms, verbalize feelings of improved mood/affect, and adopt  new coping skills prior to discharge  Outcome: Not Progressing

## 2024-09-26 NOTE — NURSING NOTE
Pt denies SI/HI/AH/VH. Present in dayroom and milieu but is mostly isolative to his room this morning. Minimal interaction with select peers. Medication and meal compliant.  Pt compliant with Lunch. Scant with communication. No further concerns as of present. Plan of care ongoing.

## 2024-09-26 NOTE — NURSING NOTE
Pt denied all psychiatric symptoms. Able to make needs known. Flat affect. Offers no new concerns. Minimal peer interaction. No behavioral issues.

## 2024-09-27 PROCEDURE — 99232 SBSQ HOSP IP/OBS MODERATE 35: CPT | Performed by: PSYCHIATRY & NEUROLOGY

## 2024-09-27 RX ADMIN — FAMOTIDINE 20 MG: 20 TABLET ORAL at 17:33

## 2024-09-27 RX ADMIN — CYANOCOBALAMIN TAB 1000 MCG 1000 MCG: 1000 TAB at 08:05

## 2024-09-27 RX ADMIN — CHOLECALCIFEROL TAB 25 MCG (1000 UNIT) 2000 UNITS: 25 TAB at 08:05

## 2024-09-27 RX ADMIN — ATORVASTATIN CALCIUM 10 MG: 10 TABLET, FILM COATED ORAL at 17:33

## 2024-09-27 RX ADMIN — FAMOTIDINE 20 MG: 20 TABLET ORAL at 08:05

## 2024-09-27 RX ADMIN — SERTRALINE HYDROCHLORIDE 150 MG: 100 TABLET ORAL at 08:05

## 2024-09-27 RX ADMIN — ARIPIPRAZOLE 5 MG: 5 TABLET ORAL at 08:05

## 2024-09-27 RX ADMIN — LEVOTHYROXINE SODIUM 37.5 MCG: 125 TABLET ORAL at 06:09

## 2024-09-27 NOTE — SOCIAL WORK
Cm met with Pt and discussed discharge. Pt reported feeling good and eager to take his next steps. Cm and Pt called [facility]  and left a voicemail with call back information.   Pt reported him and his Mother never discussed a financial plan for Pt once she passed away.   Pt reported he worked 13 years ago for a small amount of time. Pt reported his Mother did everything for him and he was never aware of any financial status. Pt reported that ever since his father passed away when he was [age] years old, his Mother was worried something would happen to pt and she always did everything for him. Pt reported he has no supports. Pt reported that he has only ever been to the hospital less than a hand full of times related to only medical reasons. Pt was positive and pleasant during this conversation. Cm provided Pt with encouragement. Pt was receptive and smiling at times.

## 2024-09-27 NOTE — PLAN OF CARE
Problem: Ineffective Coping  Goal: Identifies ineffective coping skills  Outcome: Progressing  Goal: Identifies healthy coping skills  Outcome: Progressing  Goal: Demonstrates healthy coping skills  Outcome: Progressing  Goal: Participates in unit activities  Description: Interventions:  - Provide therapeutic environment   - Provide required programming   - Redirect inappropriate behaviors   Outcome: Progressing     Problem: Risk for Self Injury/Neglect  Goal: Treatment Goal: Remain safe during length of stay, learn and adopt new coping skills, and be free of self-injurious ideation, impulses and acts at the time of discharge  Outcome: Progressing     Problem: Depression  Goal: Treatment Goal: Demonstrate behavioral control of depressive symptoms, verbalize feelings of improved mood/affect, and adopt new coping skills prior to discharge  Outcome: Progressing  Goal: Verbalize thoughts and feelings  Description: Interventions:  - Assess and re-assess patient's level of risk   - Engage patient in 1:1 interactions, daily, for a minimum of 15 minutes   - Encourage patient to express feelings, fears, frustrations, hopes   Outcome: Progressing  Goal: Refrain from harming self  Description: Interventions:  - Monitor patient closely, per order   - Supervise medication ingestion, monitor effects and side effects   Outcome: Progressing  Goal: Refrain from isolation  Description: Interventions:  - Develop a trusting relationship   - Encourage socialization   Outcome: Progressing  Goal: Refrain from self-neglect  Outcome: Progressing  Goal: Attend and participate in unit activities, including therapeutic, recreational, and educational groups  Description: Interventions:  - Provide therapeutic and educational activities daily, encourage attendance and participation, and document same in the medical record   Outcome: Progressing  Goal: Complete daily ADLs, including personal hygiene independently, as able  Description:  Interventions:  - Observe, teach, and assist patient with ADLS  -  Monitor and promote a balance of rest/activity, with adequate nutrition and elimination   Outcome: Progressing     Problem: Anxiety  Goal: Anxiety is at manageable level  Description: Interventions:  - Assess and monitor patient's anxiety level.   - Monitor for signs and symptoms (heart palpitations, chest pain, shortness of breath, headaches, nausea, feeling jumpy, restlessness, irritable, apprehensive).   - Collaborate with interdisciplinary team and initiate plan and interventions as ordered.  - [place] patient to unit/surroundings  - Explain treatment plan  - Encourage participation in care  - Encourage verbalization of concerns/fears  - Identify coping mechanisms  - Assist in developing anxiety-reducing skills  - Administer/offer alternative therapies  - Limit or eliminate stimulants  Outcome: Progressing     Problem: Knowledge Deficit  Goal: Patient/family/caregiver demonstrates understanding of disease process, treatment plan, medications, and discharge instructions  Description: Complete learning assessment and assess knowledge base.  Interventions:  - Provide teaching at level of understanding  - Provide teaching via preferred learning methods  Outcome: Progressing     Problem: SLEEP DISTURBANCE  Goal: Will exhibit normal sleeping pattern  Description: Interventions:  -  Assess the patients sleep pattern, noting recent changes  - Administer medication as ordered  - Decrease environmental stimuli, including noise, as appropriate during the night  - Encourage the patient to actively participate in unit groups and or exercise during the day to enhance ability to achieve adequate sleep at night  - Assess the patient, in the morning, encouraging a description of sleep experience  Outcome: Progressing

## 2024-09-27 NOTE — NURSING NOTE
Pt denies SI/HI/AH/VH. Pt reports anxiety and depression. Pt denies need for PRN. Pt states he will see how he feels after he talks to the  today. Medication and meal compliant. Isolative to room and self. Pt is calm and pleasant. Pt was more visible later int he morning but continued to be isolative to self. Compliant with Lunch. No further concerns as of present. Plan of care ongoing.

## 2024-09-27 NOTE — PROGRESS NOTES
09/27/24 0847   Team Meeting   Meeting Type Daily Rounds   Team Members Present   Team Members Present Physician;Nurse;   Physician Team Member Aria   [facility]   Care Management Team Member Noa   Patient/Family Present   Patient Present No   Patient's Family Present No     Pt is calm and cooperative. Pt is social with select peers. Pt is medication and meal compliant. Pt's discharge is pending [facility] application to  get into group home.

## 2024-09-27 NOTE — PLAN OF CARE
Pt does attend some groups, but appears to be attending less currently. Pt typically quiet, but does participate appropriately when prompted.

## 2024-09-27 NOTE — PROGRESS NOTES
Progress Note - Behavioral Health   Name: Franco Roberson 39 y.o. male I MRN: [id number]  Unit/Bed#: -02 I Date of Admission: 5/14/2024   Date of Service: 9/27/2024 I Hospital Day: 136    Assessment & Plan  MDD (major depressive disorder), recurrent severe, without psychosis (HCC)  -Continue Abilify 5mg Daily  -Continue Sertraline 150mg Daily     Autism spectrum disorder      Progress Toward Goals: Franco is currently assessed as being at their baseline with continued need for medication management, supervision for safety and ADL's. These services are not currently available in a less restrictive environment necessitating continued hospital stay.  Franco should remain on the unit until these services are available, due to likelihood of mental decompensation and readmission if discharged to an unsupervised setting.  Assertive discharge planning and collaboration with multiple providers (inpatient and community based) remains ongoing.  Franco is currently awaiting for group home placement.     Recommended Treatment: Continue with group therapy, milieu therapy and occupational therapy.      Risks, benefits and possible side effects of Medications:   Risks, benefits, and possible side effects of medications explained to patient and patient verbalizes understanding.      History of Present Illness   Behavior over the last 24 hours:  unchanged  Sleep: normal  Appetite: normal  Medication side effects: No  ROS: no complaints    Subjective:  Patient seen out in dining area at table where he is writing in a journal.  He is pleasant and cooperative.   He reports living and depending on his mother previously however his mother has passed away 8 or 9 months ago.  He does report some anxiety and depression.  He reports he is however feeling better and patient says he is waiting for a group home.  He reports his sleep and appetite are adequate.  Medication compliant        Objective   Mental Status Evaluation:  Appearance:  age  appropriate and casually dressed   Behavior:  Calm and cooperative   Speech:  normal pitch and normal volume   Mood:  anxious and depressed   Affect:  constricted   Thought Process:  goal directed   Thought Content:  No overt delusions or paranoia verbalized   Perceptual Disturbances: None   Risk Potential: Suicidal Ideations none  Homicidal Ideations none  Potential for Aggression No   Sensorium:  person, place, and time/date   Memory:  recent and remote memory grossly intact   Consciousness:  alert and awake    Attention: attention span appeared shorter than expected for age   Insight:  limited   Judgment: limited   Gait/Station: Patient sitting at a table   Motor Activity: no abnormal movements     Medications: all current active meds have been reviewed.      Lab Results: I have reviewed the following results: CBC/BMP: No new results in last 24 hours.   Most Recent Labs:   Lab Results   Component Value Date    WBC 7.01 06/27/2024    RBC 4.54 06/27/2024    HGB 14.5 06/27/2024    HCT 44.4 06/27/2024     06/27/2024    RDW 12.0 06/27/2024    NEUTROABS 4.01 06/27/2024    SODIUM 139 07/10/2024    K 3.8 07/10/2024     07/10/2024    CO2 27 07/10/2024    BUN 16 07/10/2024    CREATININE 0.92 07/10/2024    GLUC 87 07/10/2024    GLUF 87 07/10/2024    CALCIUM 9.3 07/10/2024    AST 28 07/10/2024    ALT 36 07/10/2024    ALKPHOS 66 07/10/2024    TP 7.7 07/10/2024    ALB 4.4 07/10/2024    TBILI 0.55 07/10/2024    CHOLESTEROL 168 09/19/2024    HDL 42 09/19/2024    TRIG 137 09/19/2024    LDLCALC 99 09/19/2024    NONHDLC 126 09/19/2024    [id number] 6.241 (H) 09/10/2024    FREET4 0.56 (L) 09/10/2024

## 2024-09-27 NOTE — PROGRESS NOTES
09/12/24 1000   Team Meeting   Meeting Type Tx Team Meeting   Team Members Present   Team Members Present Physician;Nurse;   Physician Team Member ree   [facility]   Care Management Team Member Noa   Patient/Family Present   Patient Present Yes   Patient's Family Present No

## 2024-09-27 NOTE — NURSING NOTE
Patient isolated to room this shift, patient appears withdrawn and depressed. Patient denies Depression and SI/HI/AH/VH at this time. Compliant with medications and meals and routine vitals.

## 2024-09-28 PROCEDURE — 99232 SBSQ HOSP IP/OBS MODERATE 35: CPT

## 2024-09-28 RX ADMIN — CHOLECALCIFEROL TAB 25 MCG (1000 UNIT) 2000 UNITS: 25 TAB at 08:36

## 2024-09-28 RX ADMIN — FAMOTIDINE 20 MG: 20 TABLET ORAL at 17:13

## 2024-09-28 RX ADMIN — ARIPIPRAZOLE 5 MG: 5 TABLET ORAL at 08:36

## 2024-09-28 RX ADMIN — LEVOTHYROXINE SODIUM 37.5 MCG: 125 TABLET ORAL at 06:56

## 2024-09-28 RX ADMIN — FAMOTIDINE 20 MG: 20 TABLET ORAL at 08:36

## 2024-09-28 RX ADMIN — SERTRALINE HYDROCHLORIDE 150 MG: 100 TABLET ORAL at 08:36

## 2024-09-28 RX ADMIN — ATORVASTATIN CALCIUM 10 MG: 10 TABLET, FILM COATED ORAL at 17:13

## 2024-09-28 RX ADMIN — CYANOCOBALAMIN TAB 1000 MCG 1000 MCG: 1000 TAB at 08:36

## 2024-09-28 NOTE — PROGRESS NOTES
"Progress Note - Behavioral Health   Name: Franco Roberson 39 y.o. male I MRN: [id number]  Unit/Bed#: -02 I Date of Admission: 5/14/2024   Date of Service: 9/28/2024 I Hospital Day: 137     Assessment & Plan  MDD (major depressive disorder), recurrent severe, without psychosis (HCC)  No medications changes at this time, will continue to monitor and optimize as indicated:  Abilify 5 mg daily as mood adjunct   Zoloft 150 mg daily for depression and anxiety    Continue to encourage participation in group therapy, milieu therapy and occupational therapy.  Continue to assess for side effects of medications.  Continue collaboration with [facility] for medical co-morbidities as indicated.  Continue discussion with CM/SW to assist with obtaining collateral, disposition planning, and the implementation of patient-centered individualized plan of care.  Continue frequent safety checks and vitals per unit protocol.    Risks, benefits and possible side effects of Medications: Risks, benefits, and possible side effects of medications have previously been explained. No new medications at this time.      Legal status: 201    Disposition: to be determined, pending SOAR application for potential group home placement     Autism spectrum disorder  Continue supportive care    ------------------------------------------------------------    Subjective: Patient's chart was reviewed, and patient's progress and plan was discussed with treatment team. Per nursing report, Franco has been cooperative on the unit and compliant with medications. However, he does appear to be increasingly withdrawn, noted to be eating his meals in his room with the lights off.     Franco was evaluated this morning for continuity of care. He states his mood is \"pretty good.\" He reports sleeping okay. His appetite has been normal. He denies adverse effects from medications. This writer asked how he would rate his depression currently due to reports of nursing saying he " "is more withdrawn. He states that it is currently 1 out of 10. He shares that he is sensitive to light so he prefers to eat his meals in his room because of this. He does say his anxiety is 4 out of 10 in relation to his discharge planning, but he does not wish to make adjustments to his Zoloft at this time. He denies active or passive suicidal ideation and homicidal ideation. He denies auditory or visual hallucinations.    VS: Reviewed, within normal limits    Progress Toward Goals: Patient is cooperative on the unit, but appearing withdrawn and depressed.     Psychiatric Review of Systems:  Behavior over the last 24 hours: unchanged  Sleep: normal  Appetite: adequate  Medication side effects: none verbalized  Medical ROS: Complete review of systems is negative except as noted above.    Vital signs in last 24 hours:  Temp:  [96.8 °F (36 °C)-97.1 °F (36.2 °C)] 96.8 °F (36 °C)  HR:  [64-71] 70  Resp:  [16] 16  BP: (109-121)/(65-76) 109/68    Mental Status Exam:    Appearance:  alert, disheveled , unkept hair , dressed in jay t-shirt, wrapped in blanket, minimal eye contact, and obese   Behavior:  calm and cooperative   Speech:  spontaneous, normal rate, normal volume, and coherent   Mood:  \"Pretty good\"   Affect:  constricted   Thought Process:  generally linear and goal-directed but concrete at times   Associations: concrete associations   Thought Content:  no verbalized delusions or overt paranoia   Perceptual Disturbances: denies current auditory or visual hallucinations and does not appear to be responding to internal stimuli at this time   Risk Potential: Suicidal ideation - None  Homicidal ideation - None  Potential for aggression - No   Sensorium:  oriented to person, place, and time/date   Memory:  recent and remote memory grossly intact   Consciousness:  alert and awake   Attention/Concentration: attention span and concentration appear shorter than expected for age   Insight:  limited   Judgment: limited "   Gait/Station: Lying in bed   Motor Activity: no abnormal movements     Current Medications:  Current Facility-Administered Medications   Medication Dose Route Frequency Provider Last Rate    acetaminophen  650 mg Oral Q6H PRN Basilio Brown MD      acetaminophen  650 mg Oral Q4H PRN Basilio Brown MD      acetaminophen  975 mg Oral Q6H PRN Basilio Brown MD      aluminum-magnesium hydroxide-simethicone  30 mL Oral Q4H PRN Basilio Brown MD      ARIPiprazole  5 mg Oral Daily Basilio Panda MD      Artificial Tears  1 drop Both Eyes Q3H PRN Basilio Brown MD      atorvastatin  10 mg Oral Daily With Dinner WALLY Baptiste      benztropine  1 mg Intramuscular Q4H PRN Max 6/day Basilio Brown MD      benztropine  1 mg Oral Q4H PRN Max 6/day Basilio Brown MD      Cholecalciferol  2,000 Units Oral Daily WALLY Baptiste      cyanocobalamin  1,000 mcg Oral Daily WALLY Baptiste      Diclofenac Sodium  2 g Topical 4x Daily PRN WALLY Baptiste      hydrOXYzine HCL  50 mg Oral Q6H PRN Max 4/day Basilio Brown MD      Or    diphenhydrAMINE  50 mg Intramuscular Q6H PRN Basilio Brown MD      diphenhydrAMINE-zinc acetate   Topical Daily PRN Raj Guerrero MD      famotidine  20 mg Oral BID WALLY Baptiste      hydrOXYzine HCL  100 mg Oral Q6H PRN Max 4/day Basilio Brown MD      Or    LORazepam  2 mg Intramuscular Q6H PRN Basilio Brown MD      hydrOXYzine HCL  25 mg Oral Q6H PRN Max 4/day Basilio Brown MD      levothyroxine  37.5 mcg Oral Early Morning WALLY Baptiste      melatonin  6 mg Oral HS PRN WALLY Gomez      methocarbamol  500 mg Oral Q6H PRN WALLY Baptiste      OLANZapine  5 mg Oral Q4H PRN Max 3/day Basilio Brown MD      Or    OLANZapine  2.5 mg Intramuscular Q4H PRN Max 3/day Basilio Brown MD      OLANZapine  5 mg Oral Q3H PRN Max  3/day Basilio Brown MD      Or    OLANZapine  5 mg Intramuscular Q3H PRN Max 3/day Basilio Brown MD      OLANZapine  2.5 mg Oral Q4H PRN Max 6/day Basilio Brown MD      polyethylene glycol  17 g Oral Daily PRN Basilio Brown MD      propranolol  10 mg Oral Q8H PRN Basilio Brown MD      senna-docusate sodium  1 tablet Oral Daily PRN Basilio Brown MD      sertraline  150 mg Oral Daily WALLY Gomez      traZODone  50 mg Oral HS PRN Basilio Brown MD         Behavioral Health Medications: all current active meds have been reviewed. Changes as in plan section above.    Laboratory results:  I have personally reviewed all pertinent laboratory/tests results.   No results found for this or any previous visit (from the past 48 hour(s)).     Counseling / Coordination of Care:  Total floor / unit time spent today 25 minutes. Greater than 50% of total time was spent with the patient and / or family counseling and / or coordination of care. A description of counseling / coordination of care:  Patient's progress reviewed with nursing staff.    Felicita Ortiz DO 09/28/24  Psychiatry Residency, PGY-II  This note has been constructed using a voice recognition system. There may be translation, syntax, or grammatical errors. If you have any questions, please contact the dictating author.    Patient's case was discussed with writer. I agree with evaluation, assessment, and plan as documented by resident psychiatrist.    Marcos Smith MD   9/28/24

## 2024-09-28 NOTE — NURSING NOTE
Patient is calm and cooperative upon approach. Patient isolative to room and self. Patient denies SI/HI/AH/VH. Patient is compliant with meds and meals

## 2024-09-28 NOTE — NURSING NOTE
"Patient  secluded to room this shift , except to retrieve meals. Patient was noted eating his meals tray in room with lights off. When this nurse suggested to turn light on to better see meal patient responded \"no I feel fine and good this way\". Patient compliant with medication and routine care. Denies SI/HI/AH/VH ,but appears increasingly depressed and withdrawn.  "

## 2024-09-28 NOTE — ASSESSMENT & PLAN NOTE
No medications changes at this time, will continue to monitor and optimize as indicated:  Abilify 5 mg daily as mood adjunct   Zoloft 150 mg daily for depression and anxiety    Continue to encourage participation in group therapy, milieu therapy and occupational therapy.  Continue to assess for side effects of medications.  Continue collaboration with [facility] for medical co-morbidities as indicated.  Continue discussion with CM/SW to assist with obtaining collateral, disposition planning, and the implementation of patient-centered individualized plan of care.  Continue frequent safety checks and vitals per unit protocol.    Risks, benefits and possible side effects of Medications: Risks, benefits, and possible side effects of medications have previously been explained. No new medications at this time.      Legal status: 201    Disposition: to be determined, pending SOAR application for potential group home placement

## 2024-09-29 PROCEDURE — 99232 SBSQ HOSP IP/OBS MODERATE 35: CPT

## 2024-09-29 RX ADMIN — ARIPIPRAZOLE 5 MG: 5 TABLET ORAL at 08:11

## 2024-09-29 RX ADMIN — FAMOTIDINE 20 MG: 20 TABLET ORAL at 17:03

## 2024-09-29 RX ADMIN — SERTRALINE HYDROCHLORIDE 150 MG: 100 TABLET ORAL at 08:11

## 2024-09-29 RX ADMIN — ATORVASTATIN CALCIUM 10 MG: 10 TABLET, FILM COATED ORAL at 17:03

## 2024-09-29 RX ADMIN — CHOLECALCIFEROL TAB 25 MCG (1000 UNIT) 2000 UNITS: 25 TAB at 08:11

## 2024-09-29 RX ADMIN — LEVOTHYROXINE SODIUM 37.5 MCG: 125 TABLET ORAL at 06:42

## 2024-09-29 RX ADMIN — CYANOCOBALAMIN TAB 1000 MCG 1000 MCG: 1000 TAB at 08:11

## 2024-09-29 RX ADMIN — FAMOTIDINE 20 MG: 20 TABLET ORAL at 08:11

## 2024-09-29 NOTE — PROGRESS NOTES
"Progress Note - Behavioral Health   Name: Franco Roberson 39 y.o. male I MRN: [id number]  Unit/Bed#: -02 I Date of Admission: 5/14/2024   Date of Service: 9/29/2024 I Hospital Day: 138       Case was discussed with writer. I agree with evaluation, assessment, and plan as documented by resident psychiatrist. No medication changes indicated at this time.    Marcos Smith MD  9/29/24    Assessment & Plan  MDD (major depressive disorder), recurrent severe, without psychosis (HCC)  No medications changes at this time, will continue to monitor and optimize as indicated:  Abilify 5 mg daily as mood adjunct   Zoloft 150 mg daily for depression and anxiety    Continue to encourage participation in group therapy, milieu therapy and occupational therapy.  Continue to assess for side effects of medications.  Continue collaboration with [facility] for medical co-morbidities as indicated.  Continue discussion with CM/SW to assist with obtaining collateral, disposition planning, and the implementation of patient-centered individualized plan of care.  Continue frequent safety checks and vitals per unit protocol.    Risks, benefits and possible side effects of Medications: Risks, benefits, and possible side effects of medications have previously been explained. No new medications at this time.      Legal status: 201    Disposition: to be determined, pending SOAR application for potential group home placement     Autism spectrum disorder  Continue supportive care        ------------------------------------------------------------    Subjective: Patient's chart was reviewed, and patient's progress and plan was discussed with treatment team. Per nursing report, Franco has been cooperative at times, he appears withdrawn but denies depression or anxiety. He remains compliant with scheduled medications.     Franco was evaluated this morning for continuity of care.  He states his mood is \"okay.\" He reports sleeping well and his energy " "level today is fine. His appetite has been normal. He  adverse effects from medications. He states that his anxiety and depression are both low. He denies active or passive suicidal ideation and homicidal ideation. He denies auditory or visual hallucinations.    VS: Reviewed, within normal limits    Progress Toward Goals: Patient appears stable, but continues to require inpatient hospitalization until established disposition plan is in place given high risk of decompensation if discharged.     Psychiatric Review of Systems:  Behavior over the last 24 hours: unchanged  Sleep: normal  Appetite: adequate  Medication side effects: none verbalized  Medical ROS: Complete review of systems is negative except as noted above.    Vital signs in last 24 hours:  Temp:  [96.5 °F (35.8 °C)-97 °F (36.1 °C)] 96.5 °F (35.8 °C)  HR:  [72-78] 78  Resp:  [16-17] 16  BP: (125-133)/(71) 125/71    Mental Status Exam:    Appearance:  alert, disheveled , unkept hair , bearded, casually dressed, minimal eye contact, and obese   Behavior:  calm and cooperative   Speech:  spontaneous, slow, soft, and scant   Mood:  \"okay\"   Affect:  constricted   Thought Process:  generally linear and goal-directed but concrete at times   Associations: concrete associations   Thought Content:  no verbalized delusions or overt paranoia   Perceptual Disturbances: denies current auditory or visual hallucinations and does not appear to be responding to internal stimuli at this time   Risk Potential: Suicidal ideation - None at present  Homicidal ideation - None at present  Potential for aggression - No   Sensorium:  oriented to person, place, and time/date   Memory:  recent and remote memory grossly intact   Consciousness:  alert and awake   Attention/Concentration: attention span and concentration appear shorter than expected for age   Insight:  limited   Judgment: limited   Gait/Station: normal gait/station   Motor Activity: no abnormal movements     Current " Medications:  Current Facility-Administered Medications   Medication Dose Route Frequency Provider Last Rate    acetaminophen  650 mg Oral Q6H PRN Basilio Brown MD      acetaminophen  650 mg Oral Q4H PRN Basilio Brown MD      acetaminophen  975 mg Oral Q6H PRN Basilio Brown MD      aluminum-magnesium hydroxide-simethicone  30 mL Oral Q4H PRN Basiilo Brown MD      ARIPiprazole  5 mg Oral Daily Basilio Panda MD      Artificial Tears  1 drop Both Eyes Q3H PRN Basilio Brown MD      atorvastatin  10 mg Oral Daily With Dinner WALLY Baptiste      benztropine  1 mg Intramuscular Q4H PRN Max 6/day Basilio Brown MD      benztropine  1 mg Oral Q4H PRN Max 6/day Basilio Brown MD      Cholecalciferol  2,000 Units Oral Daily WALLY Baptiste      cyanocobalamin  1,000 mcg Oral Daily WALLY Baptiste      Diclofenac Sodium  2 g Topical 4x Daily PRN WALLY Baptiste      hydrOXYzine HCL  50 mg Oral Q6H PRN Max 4/day Basilio Brown MD      Or    diphenhydrAMINE  50 mg Intramuscular Q6H PRN Basilio Brown MD      diphenhydrAMINE-zinc acetate   Topical Daily PRN Raj Guerrero MD      famotidine  20 mg Oral BID WALLY Baptiste      hydrOXYzine HCL  100 mg Oral Q6H PRN Max 4/day Basilio Brown MD      Or    LORazepam  2 mg Intramuscular Q6H PRN Basilio Brown MD      hydrOXYzine HCL  25 mg Oral Q6H PRN Max 4/day Basilio Brown MD      levothyroxine  37.5 mcg Oral Early Morning WALLY Baptiste      melatonin  6 mg Oral HS PRN WALLY Gomez      methocarbamol  500 mg Oral Q6H PRN WALLY Baptiste      OLANZapine  5 mg Oral Q4H PRN Max 3/day Basilio Brown MD      Or    OLANZapine  2.5 mg Intramuscular Q4H PRN Max 3/day Basilio Brown MD      OLANZapine  5 mg Oral Q3H PRN Max 3/day Basilio Brown MD      Or    OLANZapine  5 mg Intramuscular Q3H PRN  Max 3/day Basilio Brown MD      OLANZapine  2.5 mg Oral Q4H PRN Max 6/day Basilio Brown MD      polyethylene glycol  17 g Oral Daily PRN Basilio Brown MD      propranolol  10 mg Oral Q8H PRN Basilio Brown MD      senna-docusate sodium  1 tablet Oral Daily PRN Basilio Brown MD      sertraline  150 mg Oral Daily WALLY Gomez      traZODone  50 mg Oral HS PRN Basilio Brown MD         Behavioral Health Medications: all current active meds have been reviewed. Changes as in plan section above.    Laboratory results:  I have personally reviewed all pertinent laboratory/tests results.   No results found for this or any previous visit (from the past 48 hour(s)).     Counseling / Coordination of Care:  Total floor / unit time spent today 25 minutes. Greater than 50% of total time was spent with the patient and / or family counseling and / or coordination of care. A description of counseling / coordination of care:  Patient's progress reviewed with nursing staff.      Felicita Ortiz DO 09/29/24  Psychiatry Residency, PGY-II  This note has been constructed using a voice recognition system. There may be translation, syntax, or grammatical errors. If you have any questions, please contact the dictating author.

## 2024-09-29 NOTE — NURSING NOTE
Patient is visible on unit. Patient is pleasant and cooperative upon approach. Patient denies SI/HI/AH/Vh. Patient is compliant with meds and meals.

## 2024-09-29 NOTE — PLAN OF CARE
Problem: Ineffective Coping  Goal: Identifies ineffective coping skills  Outcome: Progressing  Goal: Identifies healthy coping skills  Outcome: Progressing  Goal: Demonstrates healthy coping skills  Outcome: Progressing  Goal: Participates in unit activities  Description: Interventions:  - Provide therapeutic environment   - Provide required programming   - Redirect inappropriate behaviors   Outcome: Not Progressing     Problem: Risk for Self Injury/Neglect  Goal: Treatment Goal: Remain safe during length of stay, learn and adopt new coping skills, and be free of self-injurious ideation, impulses and acts at the time of discharge  Outcome: Progressing     Problem: Depression  Goal: Treatment Goal: Demonstrate behavioral control of depressive symptoms, verbalize feelings of improved mood/affect, and adopt new coping skills prior to discharge  Outcome: Progressing  Goal: Verbalize thoughts and feelings  Description: Interventions:  - Assess and re-assess patient's level of risk   - Engage patient in 1:1 interactions, daily, for a minimum of 15 minutes   - Encourage patient to express feelings, fears, frustrations, hopes   Outcome: Progressing  Goal: Refrain from harming self  Description: Interventions:  - Monitor patient closely, per order   - Supervise medication ingestion, monitor effects and side effects   Outcome: Progressing  Goal: Refrain from isolation  Description: Interventions:  - Develop a trusting relationship   - Encourage socialization   Outcome: Progressing  Goal: Refrain from self-neglect  Outcome: Progressing  Goal: Attend and participate in unit activities, including therapeutic, recreational, and educational groups  Description: Interventions:  - Provide therapeutic and educational activities daily, encourage attendance and participation, and document same in the medical record   Outcome: Not Progressing  Goal: Complete daily ADLs, including personal hygiene independently, as able  Description:  Interventions:  - Observe, teach, and assist patient with ADLS  -  Monitor and promote a balance of rest/activity, with adequate nutrition and elimination   Outcome: Progressing     Problem: Anxiety  Goal: Anxiety is at manageable level  Description: Interventions:  - Assess and monitor patient's anxiety level.   - Monitor for signs and symptoms (heart palpitations, chest pain, shortness of breath, headaches, nausea, feeling jumpy, restlessness, irritable, apprehensive).   - Collaborate with interdisciplinary team and initiate plan and interventions as ordered.  - [place] patient to unit/surroundings  - Explain treatment plan  - Encourage participation in care  - Encourage verbalization of concerns/fears  - Identify coping mechanisms  - Assist in developing anxiety-reducing skills  - Administer/offer alternative therapies  - Limit or eliminate stimulants  Outcome: Progressing     Problem: DISCHARGE PLANNING - CARE MANAGEMENT  Goal: Discharge to post-acute care or home with appropriate resources  Description: INTERVENTIONS:  - Conduct assessment to determine patient/family and health care team treatment goals, and need for post-acute services based on payer coverage, community resources, and patient preferences, and barriers to discharge  - Address psychosocial, clinical, and financial barriers to discharge as identified in assessment in conjunction with the patient/family and health care team  - Arrange appropriate level of post-acute services according to patient’s   needs and preference and payer coverage in collaboration with the physician and health care team  - Communicate with and update the patient/family, physician, and health care team regarding progress on the discharge plan  - Arrange appropriate transportation to post-acute venues  Outcome: Progressing     Problem: Knowledge Deficit  Goal: Patient/family/caregiver demonstrates understanding of disease process, treatment plan, medications, and discharge  instructions  Description: Complete learning assessment and assess knowledge base.  Interventions:  - Provide teaching at level of understanding  - Provide teaching via preferred learning methods  Outcome: Progressing     Problem: SLEEP DISTURBANCE  Goal: Will exhibit normal sleeping pattern  Description: Interventions:  -  Assess the patients sleep pattern, noting recent changes  - Administer medication as ordered  - Decrease environmental stimuli, including noise, as appropriate during the night  - Encourage the patient to actively participate in unit groups and or exercise during the day to enhance ability to achieve adequate sleep at night  - Assess the patient, in the morning, encouraging a description of sleep experience  Outcome: Progressing

## 2024-09-29 NOTE — NURSING NOTE
Pt denies SI/HI/AH/VH. Present in dayroom and milieu. Isolative to self. Medication and meal compliant. Pt is calm and pleasant, reports no anxiety or depression but at times appears depressed. No further concerns as of present. Plan of care ongoing.

## 2024-09-30 PROCEDURE — 99232 SBSQ HOSP IP/OBS MODERATE 35: CPT | Performed by: PSYCHIATRY & NEUROLOGY

## 2024-09-30 RX ADMIN — ARIPIPRAZOLE 5 MG: 5 TABLET ORAL at 08:07

## 2024-09-30 RX ADMIN — ATORVASTATIN CALCIUM 10 MG: 10 TABLET, FILM COATED ORAL at 17:10

## 2024-09-30 RX ADMIN — FAMOTIDINE 20 MG: 20 TABLET ORAL at 17:10

## 2024-09-30 RX ADMIN — CHOLECALCIFEROL TAB 25 MCG (1000 UNIT) 2000 UNITS: 25 TAB at 08:07

## 2024-09-30 RX ADMIN — SERTRALINE HYDROCHLORIDE 150 MG: 100 TABLET ORAL at 08:07

## 2024-09-30 RX ADMIN — CYANOCOBALAMIN TAB 1000 MCG 1000 MCG: 1000 TAB at 08:08

## 2024-09-30 RX ADMIN — FAMOTIDINE 20 MG: 20 TABLET ORAL at 08:08

## 2024-09-30 RX ADMIN — LEVOTHYROXINE SODIUM 37.5 MCG: 125 TABLET ORAL at 06:33

## 2024-09-30 NOTE — PROGRESS NOTES
Progress Note - Behavioral Health   Name: Franco Roberson 39 y.o. male I MRN: [id number]  Unit/Bed#: -02 I Date of Admission: 5/14/2024   Date of Service: 9/30/2024 I Hospital Day: 139     Assessment & Plan  MDD (major depressive disorder), recurrent severe, without psychosis (HCC)  No medications changes at this time, will continue to monitor and optimize as indicated:  Abilify 5 mg daily as mood adjunct   Zoloft 150 mg daily for depression and anxiety    Continue to encourage participation in group therapy, milieu therapy and occupational therapy.  Continue to assess for side effects of medications.  Continue collaboration with [facility] for medical co-morbidities as indicated.  Continue discussion with CM/SW to assist with obtaining collateral, disposition planning, and the implementation of patient-centered individualized plan of care.  Continue frequent safety checks and vitals per unit protocol.    Risks, benefits and possible side effects of Medications: Risks, benefits, and possible side effects of medications have previously been explained. No new medications at this time.      Legal status: 201    Disposition: to be determined, pending SOAR application for potential group home placement. OT Cognitive Evaluation ordered to determine pts level of cognitive ability to live independently.      Autism spectrum disorder  Continue supportive care    Current medications:  Current Facility-Administered Medications   Medication Dose Route Frequency Provider Last Rate    acetaminophen  650 mg Oral Q6H PRN Basilio Brown MD      acetaminophen  650 mg Oral Q4H PRN Basilio Brown MD      acetaminophen  975 mg Oral Q6H PRN Basilio Brown MD      aluminum-magnesium hydroxide-simethicone  30 mL Oral Q4H PRN Basilio Brown MD      ARIPiprazole  5 mg Oral Daily Basilio Panda MD      Artificial Tears  1 drop Both Eyes Q3H PRN Basilio Brown MD      atorvastatin  10 mg Oral Daily With Dinner  WALLY Baptiste      benztropine  1 mg Intramuscular Q4H PRN Max 6/day Basilio Brown MD      benztropine  1 mg Oral Q4H PRN Max 6/day Basilio Brown MD      Cholecalciferol  2,000 Units Oral Daily Laura Clark WALLY Ashford      cyanocobalamin  1,000 mcg Oral Daily Laura WALLY Olmos      Diclofenac Sodium  2 g Topical 4x Daily PRN LauraWALLY Tony      hydrOXYzine HCL  50 mg Oral Q6H PRN Max 4/day Basilio Brown MD      Or    diphenhydrAMINE  50 mg Intramuscular Q6H PRN Basilio Brown MD      diphenhydrAMINE-zinc acetate   Topical Daily PRN Raj Guerrero MD      famotidine  20 mg Oral BID Laura WALLY Olmos      hydrOXYzine HCL  100 mg Oral Q6H PRN Max 4/day Basilio Brown MD      Or    LORazepam  2 mg Intramuscular Q6H PRN Basilio Brown MD      hydrOXYzine HCL  25 mg Oral Q6H PRN Max 4/day Basilio Brown MD      levothyroxine  37.5 mcg Oral Early Morning WALLY Baptiste      melatonin  6 mg Oral HS PRN WALLY Gomez      methocarbamol  500 mg Oral Q6H PRN WALLY Baptiste      OLANZapine  5 mg Oral Q4H PRN Max 3/day Basilio Brown MD      Or    OLANZapine  2.5 mg Intramuscular Q4H PRN Max 3/day Basilio Brown MD      OLANZapine  5 mg Oral Q3H PRN Max 3/day Basilio Brown MD      Or    OLANZapine  5 mg Intramuscular Q3H PRN Max 3/day Basilio Brown MD      OLANZapine  2.5 mg Oral Q4H PRN Max 6/day Basilio Brown MD      polyethylene glycol  17 g Oral Daily PRN Basilio Brown MD      propranolol  10 mg Oral Q8H PRN Basilio Brown MD      senna-docusate sodium  1 tablet Oral Daily PRN Basilio Brown MD      sertraline  150 mg Oral Daily WALLY Gomez      traZODone  50 mg Oral HS PRN Basilio Brown MD         Risks / Benefits of Treatment:    Risks, benefits, and possible side effects of medications explained to patient and patient  "verbalizes understanding and agreement for treatment.    Subjective:    Behavior over the last 24 hours: unchanged.     The patient was evaluated this morning for continuity of care and no acute distress noted throughout the evaluation.  Over the past 24 hours staff noted the patient was appearing depressed, more withdrawn, noticeable decrease in interaction with staff and peers.      Today on exam the patient was sitting in the milieu writing poetry and reports that he is trying to pass time.  He denies any feelings of depression or change in his mood.  He states he feels medications are still helping him.  He is agreeable to that OT cognitive eval.     Sleep: normal  Appetite: normal  Medication side effects: No   ROS: no complaints    Mental Status Evaluation:    Appearance:  age appropriate, casually dressed, marginal hygiene   Behavior:  pleasant, cooperative   Speech:  scant, soft   Mood:  \"Fine\"   Affect:  constricted   Thought Process:  logical, coherent   Associations: concrete associations   Thought Content:  no overt delusions   Perceptual Disturbances: no auditory hallucinations, no visual hallucinations, does not appear responding to internal stimuli   Risk Potential: Suicidal ideation - None at present  Homicidal ideation - None at present  Potential for aggression - Not at present   Sensorium:  oriented to person, place, and time/date   Memory:  recent and remote memory grossly intact   Consciousness:  alert and awake   Attention/Concentration: attention span and concentration are age appropriate   Insight:  limited   Judgment: limited   Gait/Station: normal gait/station   Motor Activity: no abnormal movements     Vital signs in last 24 hours:    Temp:  [97.4 °F (36.3 °C)-97.6 °F (36.4 °C)] 97.6 °F (36.4 °C)  HR:  [63-70] 63  Resp:  [16] 16  BP: (101-125)/(64-77) 125/77         Laboratory results: I have personally reviewed all pertinent laboratory/tests results    Most Recent Labs:   Lab Results "   Component Value Date    WBC 7.01 06/27/2024    RBC 4.54 06/27/2024    HGB 14.5 06/27/2024    HCT 44.4 06/27/2024     06/27/2024    RDW 12.0 06/27/2024    NEUTROABS 4.01 06/27/2024    SODIUM 139 07/10/2024    K 3.8 07/10/2024     07/10/2024    CO2 27 07/10/2024    BUN 16 07/10/2024    CREATININE 0.92 07/10/2024    GLUC 87 07/10/2024    CALCIUM 9.3 07/10/2024    AST 28 07/10/2024    ALT 36 07/10/2024    ALKPHOS 66 07/10/2024    TP 7.7 07/10/2024    ALB 4.4 07/10/2024    TBILI 0.55 07/10/2024    CHOLESTEROL 168 09/19/2024    HDL 42 09/19/2024    TRIG 137 09/19/2024    LDLCALC 99 09/19/2024    NONHDLC 126 09/19/2024    BOQ4CHKSZORH 6.241 (H) 09/10/2024    FREET4 0.56 (L) 09/10/2024    SYPHILISAB Non-reactive 05/15/2024       Progress Toward Goals: Franco is currently assessed as being at their baseline with continued need for medication management, supervision for safety and ADL’s. These services are not currently available in a less restrictive environment necessitating continued hospital stay.  Franco should remain on the unit until these services are available, due to likelihood of mental decompensation and readmission if discharged to an unsupervised setting.  Assertive discharge planning and collaboration with multiple providers (inpatient and community based) remains ongoing.  Franco is currently awaiting for group home placement.      Counseling / Coordination of Care:    Administrative Statements   I have spent a total time of 20 minutes in caring for this patient on the day of the visit/encounter including Counseling / Coordination of care, Documenting in the medical record, and Obtaining or reviewing history  .    Lita Knapp DO 09/30/24    This note was completed in part utilizing Dragon dictation Software. Grammatical, translation, syntax errors, random word insertions, spelling mistakes, and incomplete sentences may be an occasional consequence of this system secondary to software  limitations with voice recognition, ambient noise, and hardware issues. If you have any questions or concerns about the content, text, or information contained within the body of this dictation, please contact the provider for clarification.

## 2024-09-30 NOTE — NURSING NOTE
"Pt is visible in milieu and dayroom. He is pleasant with his peers and staff. Medication and meal compliant. He denies SI/HI/AH/VH. Pt expressed that he is feeling better knowing that \"things may be moving along. I have an appointment with social security this week\". No PRN's requested. Continuous rounding maintained. No unmet needs.   "

## 2024-09-30 NOTE — PLAN OF CARE
Pt intermittently attends some groups and is typically quiet, but will participate appropriately when prompted.   no

## 2024-09-30 NOTE — ASSESSMENT & PLAN NOTE
No medications changes at this time, will continue to monitor and optimize as indicated:  Abilify 5 mg daily as mood adjunct   Zoloft 150 mg daily for depression and anxiety    Continue to encourage participation in group therapy, milieu therapy and occupational therapy.  Continue to assess for side effects of medications.  Continue collaboration with PATRICIA for medical co-morbidities as indicated.  Continue discussion with CM/SW to assist with obtaining collateral, disposition planning, and the implementation of patient-centered individualized plan of care.  Continue frequent safety checks and vitals per unit protocol.    Risks, benefits and possible side effects of Medications: Risks, benefits, and possible side effects of medications have previously been explained. No new medications at this time.      Legal status: 201    Disposition: to be determined, pending SOAR application for potential group home placement. OT Cognitive Evaluation ordered to determine pts level of cognitive ability to live independently.

## 2024-09-30 NOTE — PROGRESS NOTES
09/30/24 0826   Team Meeting   Meeting Type Daily Rounds   Team Members Present   Team Members Present Physician;Nurse;   Physician Team Member Aria   Nursing Team Member Jeri   Care Management Team Member Noa   Patient/Family Present   Patient Present No   Patient's Family Present No     Pt is medication and meal compliant. Pt was visible eating his meals in his room in the dark. Pt is visibly depressed and will not process his feelings with staff. Provider is ordering OT to see Pt. Pt's discharge is pending placement.

## 2024-09-30 NOTE — PLAN OF CARE
Problem: Ineffective Coping  Goal: Identifies ineffective coping skills  Outcome: Progressing  Goal: Identifies healthy coping skills  Outcome: Progressing  Goal: Demonstrates healthy coping skills  Outcome: Progressing  Goal: Participates in unit activities  Description: Interventions:  - Provide therapeutic environment   - Provide required programming   - Redirect inappropriate behaviors   Outcome: Progressing     Problem: Risk for Self Injury/Neglect  Goal: Treatment Goal: Remain safe during length of stay, learn and adopt new coping skills, and be free of self-injurious ideation, impulses and acts at the time of discharge  Outcome: Progressing     Problem: Depression  Goal: Treatment Goal: Demonstrate behavioral control of depressive symptoms, verbalize feelings of improved mood/affect, and adopt new coping skills prior to discharge  Outcome: Progressing  Goal: Verbalize thoughts and feelings  Description: Interventions:  - Assess and re-assess patient's level of risk   - Engage patient in 1:1 interactions, daily, for a minimum of 15 minutes   - Encourage patient to express feelings, fears, frustrations, hopes   Outcome: Progressing  Goal: Refrain from harming self  Description: Interventions:  - Monitor patient closely, per order   - Supervise medication ingestion, monitor effects and side effects   Outcome: Progressing  Goal: Refrain from isolation  Description: Interventions:  - Develop a trusting relationship   - Encourage socialization   Outcome: Progressing  Goal: Refrain from self-neglect  Outcome: Progressing  Goal: Attend and participate in unit activities, including therapeutic, recreational, and educational groups  Description: Interventions:  - Provide therapeutic and educational activities daily, encourage attendance and participation, and document same in the medical record   Outcome: Progressing  Goal: Complete daily ADLs, including personal hygiene independently, as able  Description:  Interventions:  - Observe, teach, and assist patient with ADLS  -  Monitor and promote a balance of rest/activity, with adequate nutrition and elimination   Outcome: Progressing     Problem: Anxiety  Goal: Anxiety is at manageable level  Description: Interventions:  - Assess and monitor patient's anxiety level.   - Monitor for signs and symptoms (heart palpitations, chest pain, shortness of breath, headaches, nausea, feeling jumpy, restlessness, irritable, apprehensive).   - Collaborate with interdisciplinary team and initiate plan and interventions as ordered.  - [place] patient to unit/surroundings  - Explain treatment plan  - Encourage participation in care  - Encourage verbalization of concerns/fears  - Identify coping mechanisms  - Assist in developing anxiety-reducing skills  - Administer/offer alternative therapies  - Limit or eliminate stimulants  Outcome: Progressing     Problem: Ineffective Coping  Goal: Identifies ineffective coping skills  9/30/2024 0315 by Neida Sharpe RN  Outcome: Progressing  Goal: Identifies healthy coping skills  9/30/2024 0315 by Neida Sharpe RN  Outcome: Progressing  Goal: Demonstrates healthy coping skills  9/30/2024 0315 by Neida Sharpe RN  Outcome: Progressing  Goal: Participates in unit activities  Description: Interventions:  - Provide therapeutic environment   - Provide required programming   - Redirect inappropriate behaviors   9/30/2024 0315 by Neida Sharpe RN  Outcome: Progressing     Problem: Risk for Self Injury/Neglect  Goal: Treatment Goal: Remain safe during length of stay, learn and adopt new coping skills, and be free of self-injurious ideation, impulses and acts at the time of discharge  9/30/2024 0315 by Neida Sharpe RN  Outcome: Progressing     Problem: Depression  Goal: Treatment Goal: Demonstrate behavioral control of depressive symptoms, verbalize feelings of improved mood/affect, and adopt new coping skills prior to discharge  9/30/2024 0315 by  Neida Sharpe RN  Outcome: Progressing  Goal: Verbalize thoughts and feelings  Description: Interventions:  - Assess and re-assess patient's level of risk   - Engage patient in 1:1 interactions, daily, for a minimum of 15 minutes   - Encourage patient to express feelings, fears, frustrations, hopes   9/30/2024 0315 by Neida Sharpe RN  Outcome: Progressing  Goal: Refrain from harming self  Description: Interventions:  - Monitor patient closely, per order   - Supervise medication ingestion, monitor effects and side effects   9/30/2024 0315 by Neida Sharpe RN  Outcome: Progressing  Goal: Refrain from isolation  Description: Interventions:  - Develop a trusting relationship   - Encourage socialization   9/30/2024 0315 by Neida Sharpe RN  Outcome: Progressing  Goal: Refrain from self-neglect  9/30/2024 0315 by Neida Sharpe RN  Outcome: Progressing  Goal: Attend and participate in unit activities, including therapeutic, recreational, and educational groups  Description: Interventions:  - Provide therapeutic and educational activities daily, encourage attendance and participation, and document same in the medical record   9/30/2024 0315 by Neida Sharpe RN  Outcome: Progressing  Goal: Complete daily ADLs, including personal hygiene independently, as able  Description: Interventions:  - Observe, teach, and assist patient with ADLS  -  Monitor and promote a balance of rest/activity, with adequate nutrition and elimination   9/30/2024 0315 by Neida Sharpe RN  Outcome: Progressing     Problem: Anxiety  Goal: Anxiety is at manageable level  Description: Interventions:  - Assess and monitor patient's anxiety level.   - Monitor for signs and symptoms (heart palpitations, chest pain, shortness of breath, headaches, nausea, feeling jumpy, restlessness, irritable, apprehensive).   - Collaborate with interdisciplinary team and initiate plan and interventions as ordered.  - [place] patient to unit/surroundings  - Explain  treatment plan  - Encourage participation in care  - Encourage verbalization of concerns/fears  - Identify coping mechanisms  - Assist in developing anxiety-reducing skills  - Administer/offer alternative therapies  - Limit or eliminate stimulants  9/30/2024 0315 by Neida Sharpe RN  Outcome: Progressing     Problem: DISCHARGE PLANNING - CARE MANAGEMENT  Goal: Discharge to post-acute care or home with appropriate resources  Description: INTERVENTIONS:  - Conduct assessment to determine patient/family and health care team treatment goals, and need for post-acute services based on payer coverage, community resources, and patient preferences, and barriers to discharge  - Address psychosocial, clinical, and financial barriers to discharge as identified in assessment in conjunction with the patient/family and health care team  - Arrange appropriate level of post-acute services according to patient’s   needs and preference and payer coverage in collaboration with the physician and health care team  - Communicate with and update the patient/family, physician, and health care team regarding progress on the discharge plan  - Arrange appropriate transportation to post-acute venues  Outcome: Progressing     Problem: Knowledge Deficit  Goal: Patient/family/caregiver demonstrates understanding of disease process, treatment plan, medications, and discharge instructions  Description: Complete learning assessment and assess knowledge base.  Interventions:  - Provide teaching at level of understanding  - Provide teaching via preferred learning methods  Outcome: Progressing     Problem: SLEEP DISTURBANCE  Goal: Will exhibit normal sleeping pattern  Description: Interventions:  -  Assess the patients sleep pattern, noting recent changes  - Administer medication as ordered  - Decrease environmental stimuli, including noise, as appropriate during the night  - Encourage the patient to actively participate in unit groups and or exercise  during the day to enhance ability to achieve adequate sleep at night  - Assess the patient, in the morning, encouraging a description of sleep experience  Outcome: Progressing

## 2024-10-01 PROCEDURE — 99232 SBSQ HOSP IP/OBS MODERATE 35: CPT | Performed by: PSYCHIATRY & NEUROLOGY

## 2024-10-01 RX ADMIN — ARIPIPRAZOLE 5 MG: 5 TABLET ORAL at 08:27

## 2024-10-01 RX ADMIN — SERTRALINE HYDROCHLORIDE 150 MG: 100 TABLET ORAL at 08:27

## 2024-10-01 RX ADMIN — CHOLECALCIFEROL TAB 25 MCG (1000 UNIT) 2000 UNITS: 25 TAB at 08:27

## 2024-10-01 RX ADMIN — ATORVASTATIN CALCIUM 10 MG: 10 TABLET, FILM COATED ORAL at 17:58

## 2024-10-01 RX ADMIN — LEVOTHYROXINE SODIUM 37.5 MCG: 125 TABLET ORAL at 06:52

## 2024-10-01 RX ADMIN — FAMOTIDINE 20 MG: 20 TABLET ORAL at 17:58

## 2024-10-01 RX ADMIN — CYANOCOBALAMIN TAB 1000 MCG 1000 MCG: 1000 TAB at 08:27

## 2024-10-01 RX ADMIN — FAMOTIDINE 20 MG: 20 TABLET ORAL at 08:27

## 2024-10-01 NOTE — NURSING NOTE
Pt visible on unit in milieu and dayroom. He was more isolative to room and self earlier in the shift. Pt is now interacting with his peers. Medication and meal compliant. He denies SI/HI/AH/VH, but appears sad at times. No PRN's requested. No unmet needs.

## 2024-10-01 NOTE — NURSING NOTE
Franco is calm and pleasant with an constricted affect, visible on the milieu writing in journal in dayroom. Says he is working on SSI application and looking forward to potential group home in [place] where he is from. He denies  depression, anxiety, SI/HI/AVH. Says his appetite and sleep are adequate. No unmet needs currently.

## 2024-10-01 NOTE — PROGRESS NOTES
10/01/24 0839   Team Meeting   Meeting Type Daily Rounds   Team Members Present   Team Members Present Physician;Nurse;   Physician Team Member Aria   Nursing Team Member [facility]   Care Management Team Member Noa   Patient/Family Present   Patient Present No   Patient's Family Present No     Pt is calm and cooperative. Pt is medication and meal compliant. Pt is visible on the unit being social with peers. Pt is using his journal. Pt is going to be seen by OT for evaluation. Pt's discharge is pending placement.

## 2024-10-01 NOTE — PLAN OF CARE
Problem: Ineffective Coping  Goal: Identifies ineffective coping skills  Outcome: Progressing  Goal: Identifies healthy coping skills  Outcome: Progressing     Problem: Risk for Self Injury/Neglect  Goal: Treatment Goal: Remain safe during length of stay, learn and adopt new coping skills, and be free of self-injurious ideation, impulses and acts at the time of discharge  Outcome: Progressing     Problem: Depression  Goal: Verbalize thoughts and feelings  Description: Interventions:  - Assess and re-assess patient's level of risk   - Engage patient in 1:1 interactions, daily, for a minimum of 15 minutes   - Encourage patient to express feelings, fears, frustrations, hopes   Outcome: Progressing  Goal: Refrain from harming self  Description: Interventions:  - Monitor patient closely, per order   - Supervise medication ingestion, monitor effects and side effects   Outcome: Progressing     Problem: SLEEP DISTURBANCE  Goal: Will exhibit normal sleeping pattern  Description: Interventions:  -  Assess the patients sleep pattern, noting recent changes  - Administer medication as ordered  - Decrease environmental stimuli, including noise, as appropriate during the night  - Encourage the patient to actively participate in unit groups and or exercise during the day to enhance ability to achieve adequate sleep at night  - Assess the patient, in the morning, encouraging a description of sleep experience  Outcome: Progressing

## 2024-10-01 NOTE — PROGRESS NOTES
Progress Note - Behavioral Health   Name: Franco Roberson 39 y.o. male I MRN: [id number]  Unit/Bed#: -02 I Date of Admission: 5/14/2024   Date of Service: 10/1/2024 I Hospital Day: 140     Assessment & Plan  MDD (major depressive disorder), recurrent severe, without psychosis (HCC)  No medications changes at this time, will continue to monitor and optimize as indicated:  Abilify 5 mg daily as mood adjunct   Zoloft 150 mg daily for depression and anxiety    Continue to encourage participation in group therapy, milieu therapy and occupational therapy.  Continue to assess for side effects of medications.  Continue collaboration with SLIM for medical co-morbidities as indicated.  Continue discussion with CM/SW to assist with obtaining collateral, disposition planning, and the implementation of patient-centered individualized plan of care.  Continue frequent safety checks and vitals per unit protocol.    Risks, benefits and possible side effects of Medications: Risks, benefits, and possible side effects of medications have previously been explained. No new medications at this time.      Legal status: 201    Disposition: to be determined, pending SOAR application for potential group home placement. OT Cognitive Evaluation ordered to determine pts level of cognitive ability to live independently.      Autism spectrum disorder  Continue supportive care    Current medications:  Current Facility-Administered Medications   Medication Dose Route Frequency Provider Last Rate    acetaminophen  650 mg Oral Q6H PRN Basilio Brown MD      acetaminophen  650 mg Oral Q4H PRN Basilio Brown MD      acetaminophen  975 mg Oral Q6H PRN Basilio Brown MD      aluminum-magnesium hydroxide-simethicone  30 mL Oral Q4H PRN Basilio Brown MD      ARIPiprazole  5 mg Oral Daily Basilio Panda MD      Artificial Tears  1 drop Both Eyes Q3H PRN Basilio Brown MD      atorvastatin  10 mg Oral Daily With Dinner  WALLY Baptiste      benztropine  1 mg Intramuscular Q4H PRN Max 6/day Basilio Brown MD      benztropine  1 mg Oral Q4H PRN Max 6/day Basilio Brown MD      Cholecalciferol  2,000 Units Oral Daily Laura Clark WALLY Ashford      cyanocobalamin  1,000 mcg Oral Daily Laura WALLY Olmos      Diclofenac Sodium  2 g Topical 4x Daily PRN LauraWALLY Tony      hydrOXYzine HCL  50 mg Oral Q6H PRN Max 4/day Basilio Brown MD      Or    diphenhydrAMINE  50 mg Intramuscular Q6H PRN Basilio Brown MD      diphenhydrAMINE-zinc acetate   Topical Daily PRN Raj Guerrero MD      famotidine  20 mg Oral BID Laura WALLY Olmos      hydrOXYzine HCL  100 mg Oral Q6H PRN Max 4/day Basilio Brown MD      Or    LORazepam  2 mg Intramuscular Q6H PRN Basilio Brown MD      hydrOXYzine HCL  25 mg Oral Q6H PRN Max 4/day Basilio Brown MD      levothyroxine  37.5 mcg Oral Early Morning WALLY Baptiste      melatonin  6 mg Oral HS PRN WALLY Gomez      methocarbamol  500 mg Oral Q6H PRN WALLY Baptiste      OLANZapine  5 mg Oral Q4H PRN Max 3/day Basilio Brown MD      Or    OLANZapine  2.5 mg Intramuscular Q4H PRN Max 3/day Basilio Brown MD      OLANZapine  5 mg Oral Q3H PRN Max 3/day Basiloi Brown MD      Or    OLANZapine  5 mg Intramuscular Q3H PRN Max 3/day Basilio Brown MD      OLANZapine  2.5 mg Oral Q4H PRN Max 6/day Basilio Brown MD      polyethylene glycol  17 g Oral Daily PRN Basilio Brown MD      propranolol  10 mg Oral Q8H PRN Basilio Brown MD      senna-docusate sodium  1 tablet Oral Daily PRN Basilio Brown MD      sertraline  150 mg Oral Daily WALLY Gomez      traZODone  50 mg Oral HS PRN Basilio Brown MD         Risks / Benefits of Treatment:    Risks, benefits, and possible side effects of medications explained to patient and patient  verbalizes understanding and agreement for treatment.    Subjective:    Behavior over the last 24 hours: unchanged.     The patient was evaluated this morning for continuity of care and no acute distress noted throughout the evaluation.  Over the past 24 hours staff noted the patient slept well and that his mood is good.  Compliant with medications and unit rules, he has been pleasant with peers and staff.  Denying all symptoms.    Today on exam the patient was eating breakfast in his bed and reports that he is doing well today.  He denies any changes in his mood and is agreeable that medications have been helpful.  He reports no current anxiety.  Franoc was able to discuss his ability to make friends on the unit and states that he has been able to socialize which is something he has never been able to do in the past.  He is reminded of OT cognitive eval being ordered.    Sleep: normal  Appetite: normal  Medication side effects: No   ROS: no complaints    Mental Status Evaluation:    Appearance:  age appropriate, casually dressed, dressed appropriately, marginal hygiene   Behavior:  pleasant, cooperative, calm   Speech:  scant, soft   Mood:  good   Affect:  constricted, smiles appropriately at times   Thought Process:  organized, logical, coherent   Associations: intact associations   Thought Content:  no overt delusions   Perceptual Disturbances: no auditory hallucinations, no visual hallucinations, does not appear responding to internal stimuli   Risk Potential: Suicidal ideation - None at present  Homicidal ideation - None at present  Potential for aggression - No   Sensorium:  oriented to person, place, and time/date   Memory:  recent and remote memory grossly intact   Consciousness:  alert and awake   Attention/Concentration: attention span and concentration are age appropriate   Insight:  limited   Judgment: limited   Gait/Station: normal gait/station   Motor Activity: no abnormal movements     Vital signs in  last 24 hours:    Temp:  [97 °F (36.1 °C)-97.6 °F (36.4 °C)] 97 °F (36.1 °C)  HR:  [60-63] 60  BP: (103-125)/(63-77) 103/63  Resp:  [16] 16  SpO2:  [96 %-97 %] 97 %  O2 Device: None (Room air)         Laboratory results: I have personally reviewed all pertinent laboratory/tests results    Most Recent Labs:   Lab Results   Component Value Date    WBC 7.01 06/27/2024    RBC 4.54 06/27/2024    HGB 14.5 06/27/2024    HCT 44.4 06/27/2024     06/27/2024    RDW 12.0 06/27/2024    NEUTROABS 4.01 06/27/2024    SODIUM 139 07/10/2024    K 3.8 07/10/2024     07/10/2024    CO2 27 07/10/2024    BUN 16 07/10/2024    CREATININE 0.92 07/10/2024    GLUC 87 07/10/2024    CALCIUM 9.3 07/10/2024    AST 28 07/10/2024    ALT 36 07/10/2024    ALKPHOS 66 07/10/2024    TP 7.7 07/10/2024    ALB 4.4 07/10/2024    TBILI 0.55 07/10/2024    CHOLESTEROL 168 09/19/2024    HDL 42 09/19/2024    TRIG 137 09/19/2024    LDLCALC 99 09/19/2024    NONHDLC 126 09/19/2024    KIE5BMUVHSZV 6.241 (H) 09/10/2024    FREET4 0.56 (L) 09/10/2024    SYPHILISAB Non-reactive 05/15/2024       Progress Toward Goals: Franco is currently assessed as being at their baseline with continued need for medication management, supervision for safety and ADL’s. These services are not currently available in a less restrictive environment necessitating continued hospital stay.  Franco should remain on the unit until these services are available, due to likelihood of mental decompensation and readmission if discharged to an unsupervised setting.  Assertive discharge planning and collaboration with multiple providers (inpatient and community based) remains ongoing.  Franco is currently awaiting for group home placement.      Counseling / Coordination of Care:    Administrative Statements   I have spent a total time of 20 minutes in caring for this patient on the day of the visit/encounter including Counseling / Coordination of care, Documenting in the medical record, and  Obtaining or reviewing history  .    Lita Knapp DO 10/01/24    This note was completed in part utilizing Dragon dictation Software. Grammatical, translation, syntax errors, random word insertions, spelling mistakes, and incomplete sentences may be an occasional consequence of this system secondary to software limitations with voice recognition, ambient noise, and hardware issues. If you have any questions or concerns about the content, text, or information contained within the body of this dictation, please contact the provider for clarification.

## 2024-10-02 PROCEDURE — 99232 SBSQ HOSP IP/OBS MODERATE 35: CPT | Performed by: PSYCHIATRY & NEUROLOGY

## 2024-10-02 RX ADMIN — LEVOTHYROXINE SODIUM 37.5 MCG: 125 TABLET ORAL at 06:20

## 2024-10-02 RX ADMIN — ATORVASTATIN CALCIUM 10 MG: 10 TABLET, FILM COATED ORAL at 17:05

## 2024-10-02 RX ADMIN — FAMOTIDINE 20 MG: 20 TABLET ORAL at 17:05

## 2024-10-02 RX ADMIN — FAMOTIDINE 20 MG: 20 TABLET ORAL at 08:10

## 2024-10-02 RX ADMIN — CHOLECALCIFEROL TAB 25 MCG (1000 UNIT) 2000 UNITS: 25 TAB at 08:10

## 2024-10-02 RX ADMIN — ARIPIPRAZOLE 5 MG: 5 TABLET ORAL at 08:10

## 2024-10-02 RX ADMIN — SERTRALINE HYDROCHLORIDE 150 MG: 100 TABLET ORAL at 08:10

## 2024-10-02 RX ADMIN — CYANOCOBALAMIN TAB 1000 MCG 1000 MCG: 1000 TAB at 08:10

## 2024-10-02 NOTE — NURSING NOTE
Pt denies SI/HI/AH/VH. Pt appears depressed but denies depression and anxiety at this time. Pt isolative to room and self this morning. Medication and meal compliant. Pt compliant with Lunch. No further concerns as of present. Plan of care ongoing.

## 2024-10-02 NOTE — NURSING NOTE
Patient is calm, withdrawn to room later in the evening.  Affect appears depressed but patient denies depression, anxiety, SI/HI/AVH. Now resting in bed, no unmet needs currently.

## 2024-10-02 NOTE — PLAN OF CARE
Pt attends some groups. Pt typically quiet in groups, but participates appropriately when prompted.

## 2024-10-02 NOTE — PROGRESS NOTES
Progress Note - Behavioral Health   Name: Franco Roberson 39 y.o. male I MRN: [id number]  Unit/Bed#: -02 I Date of Admission: 5/14/2024   Date of Service: 10/2/2024 I Hospital Day: 141     Assessment & Plan  MDD (major depressive disorder), recurrent severe, without psychosis (HCC)  No medications changes at this time, will continue to monitor and optimize as indicated:  Abilify 5 mg daily as mood adjunct   Zoloft 150 mg daily for depression and anxiety    Continue to encourage participation in group therapy, milieu therapy and occupational therapy.  Continue to assess for side effects of medications.  Continue collaboration with SLIM for medical co-morbidities as indicated.  Continue discussion with CM/SW to assist with obtaining collateral, disposition planning, and the implementation of patient-centered individualized plan of care.  Continue frequent safety checks and vitals per unit protocol.    Risks, benefits and possible side effects of Medications: Risks, benefits, and possible side effects of medications have previously been explained. No new medications at this time.      Legal status: 201    Disposition: to be determined, pending SOAR application for potential group home placement. OT Cognitive Evaluation ordered to determine pts level of cognitive ability to live independently.      Autism spectrum disorder  Continue supportive care    Current medications:  Current Facility-Administered Medications   Medication Dose Route Frequency Provider Last Rate    acetaminophen  650 mg Oral Q6H PRN Basilio Brown MD      acetaminophen  650 mg Oral Q4H PRN Basilio Brown MD      acetaminophen  975 mg Oral Q6H PRN Basilio Brown MD      aluminum-magnesium hydroxide-simethicone  30 mL Oral Q4H PRN Basilio Brown MD      ARIPiprazole  5 mg Oral Daily Basilio Panda MD      Artificial Tears  1 drop Both Eyes Q3H PRN Basilio Brown MD      atorvastatin  10 mg Oral Daily With Dinner  WALLY Baptiste      benztropine  1 mg Intramuscular Q4H PRN Max 6/day Basilio Brown MD      benztropine  1 mg Oral Q4H PRN Max 6/day Basilio Brown MD      Cholecalciferol  2,000 Units Oral Daily Laura Clark WALLY Ashford      cyanocobalamin  1,000 mcg Oral Daily Laura WALLY Olmos      Diclofenac Sodium  2 g Topical 4x Daily PRN LauraWALLY Tony      hydrOXYzine HCL  50 mg Oral Q6H PRN Max 4/day Basilio Brown MD      Or    diphenhydrAMINE  50 mg Intramuscular Q6H PRN Basilio Brown MD      diphenhydrAMINE-zinc acetate   Topical Daily PRN Raj Guerrero MD      famotidine  20 mg Oral BID Laura WALLY Olmos      hydrOXYzine HCL  100 mg Oral Q6H PRN Max 4/day Basilio Brown MD      Or    LORazepam  2 mg Intramuscular Q6H PRN Basilio Brown MD      hydrOXYzine HCL  25 mg Oral Q6H PRN Max 4/day Basilio Brown MD      levothyroxine  37.5 mcg Oral Early Morning WALLY Baptiste      melatonin  6 mg Oral HS PRN WALLY Gomez      methocarbamol  500 mg Oral Q6H PRN WALLY Baptiste      OLANZapine  5 mg Oral Q4H PRN Max 3/day Basilio Brown MD      Or    OLANZapine  2.5 mg Intramuscular Q4H PRN Max 3/day Basilio Brown MD      OLANZapine  5 mg Oral Q3H PRN Max 3/day Basilio Brown MD      Or    OLANZapine  5 mg Intramuscular Q3H PRN Max 3/day Basilio Brown MD      OLANZapine  2.5 mg Oral Q4H PRN Max 6/day Basilio Brown MD      polyethylene glycol  17 g Oral Daily PRN Basilio Brown MD      propranolol  10 mg Oral Q8H PRN Basilio Brown MD      senna-docusate sodium  1 tablet Oral Daily PRN Basilio Brown MD      sertraline  150 mg Oral Daily WALLY Gomez      traZODone  50 mg Oral HS PRN Basilio Brown MD         Risks / Benefits of Treatment:    Risks, benefits, and possible side effects of medications explained to patient and patient  "verbalizes understanding and agreement for treatment.    Subjective:    Behavior over the last 24 hours: unchanged.     The patient was evaluated this morning for continuity of care and no acute distress noted throughout the evaluation.  Over the past 24 hours staff noted the patient was calm, withdrawn to her room at times.  Patient denying all symptoms and at times interacts with peers appropriately.    Today on exam the patient was sitting in bed and reports that he is \"feeling hopeful.\"  He denies any changes to his mood, he reports that medications continue to work.  He denies any side effects.  He denies any symptoms of depression or anxiety.  Any questions or concerns were addressed.    Sleep: normal  Appetite: normal  Medication side effects: No   ROS: no complaints    Mental Status Evaluation:    Appearance:  age appropriate, casually dressed, dressed appropriately, adequate grooming   Behavior:  pleasant, cooperative   Speech:  scant, soft   Mood:  \" Hopeful\"   Affect:  constricted   Thought Process:  logical, coherent, goal directed   Associations: concrete associations   Thought Content:  no overt delusions   Perceptual Disturbances: no auditory hallucinations, no visual hallucinations, does not appear responding to internal stimuli   Risk Potential: Suicidal ideation - None at present  Homicidal ideation - None at present  Potential for aggression - Not at present   Sensorium:  oriented to person, place, and time/date   Memory:  recent and remote memory grossly intact   Consciousness:  alert and awake   Attention/Concentration: attention span and concentration are age appropriate   Insight:  limited   Judgment: limited   Gait/Station: normal gait/station   Motor Activity: no abnormal movements     Vital signs in last 24 hours:    Temp:  [96.7 °F (35.9 °C)-97.6 °F (36.4 °C)] 97.6 °F (36.4 °C)  HR:  [69-70] 70  BP: (114-116)/(60-67) 116/67  Resp:  [16] 16  SpO2:  [96 %-98 %] 98 %  O2 Device: None (Room " air)         Laboratory results: I have personally reviewed all pertinent laboratory/tests results    Most Recent Labs:   Lab Results   Component Value Date    WBC 7.01 06/27/2024    RBC 4.54 06/27/2024    HGB 14.5 06/27/2024    HCT 44.4 06/27/2024     06/27/2024    RDW 12.0 06/27/2024    NEUTROABS 4.01 06/27/2024    SODIUM 139 07/10/2024    K 3.8 07/10/2024     07/10/2024    CO2 27 07/10/2024    BUN 16 07/10/2024    CREATININE 0.92 07/10/2024    GLUC 87 07/10/2024    CALCIUM 9.3 07/10/2024    AST 28 07/10/2024    ALT 36 07/10/2024    ALKPHOS 66 07/10/2024    TP 7.7 07/10/2024    ALB 4.4 07/10/2024    TBILI 0.55 07/10/2024    CHOLESTEROL 168 09/19/2024    HDL 42 09/19/2024    TRIG 137 09/19/2024    LDLCALC 99 09/19/2024    NONHDLC 126 09/19/2024    SIA3QJEPVRGB 6.241 (H) 09/10/2024    FREET4 0.56 (L) 09/10/2024    SYPHILISAB Non-reactive 05/15/2024       Progress Toward Goals: Franco is currently assessed as being at their baseline with continued need for medication management, supervision for safety and ADL’s. These services are not currently available in a less restrictive environment necessitating continued hospital stay.  Franco should remain on the unit until these services are available, due to likelihood of mental decompensation and readmission if discharged to an unsupervised setting.  Assertive discharge planning and collaboration with multiple providers (inpatient and community based) remains ongoing.  Franco is currently awaiting for group home placement.    Counseling / Coordination of Care:        Lita Knapp DO 10/02/24    This note was completed in part utilizing Dragon dictation Software. Grammatical, translation, syntax errors, random word insertions, spelling mistakes, and incomplete sentences may be an occasional consequence of this system secondary to software limitations with voice recognition, ambient noise, and hardware issues. If you have any questions or concerns about the  content, text, or information contained within the body of this dictation, please contact the provider for clarification.

## 2024-10-02 NOTE — PLAN OF CARE
Problem: Ineffective Coping  Goal: Identifies ineffective coping skills  Outcome: Progressing  Goal: Identifies healthy coping skills  Outcome: Progressing  Goal: Demonstrates healthy coping skills  Outcome: Progressing  Goal: Participates in unit activities  Description: Interventions:  - Provide therapeutic environment   - Provide required programming   - Redirect inappropriate behaviors   Outcome: Progressing     Problem: Risk for Self Injury/Neglect  Goal: Treatment Goal: Remain safe during length of stay, learn and adopt new coping skills, and be free of self-injurious ideation, impulses and acts at the time of discharge  Outcome: Progressing     Problem: Depression  Goal: Treatment Goal: Demonstrate behavioral control of depressive symptoms, verbalize feelings of improved mood/affect, and adopt new coping skills prior to discharge  Outcome: Progressing  Goal: Verbalize thoughts and feelings  Description: Interventions:  - Assess and re-assess patient's level of risk   - Engage patient in 1:1 interactions, daily, for a minimum of 15 minutes   - Encourage patient to express feelings, fears, frustrations, hopes   Outcome: Progressing  Goal: Refrain from harming self  Description: Interventions:  - Monitor patient closely, per order   - Supervise medication ingestion, monitor effects and side effects   Outcome: Progressing  Goal: Refrain from isolation  Description: Interventions:  - Develop a trusting relationship   - Encourage socialization   Outcome: Progressing  Goal: Refrain from self-neglect  Outcome: Progressing  Goal: Attend and participate in unit activities, including therapeutic, recreational, and educational groups  Description: Interventions:  - Provide therapeutic and educational activities daily, encourage attendance and participation, and document same in the medical record   Outcome: Progressing  Goal: Complete daily ADLs, including personal hygiene independently, as able  Description:  Interventions:  - Observe, teach, and assist patient with ADLS  -  Monitor and promote a balance of rest/activity, with adequate nutrition and elimination   Outcome: Progressing     Problem: Anxiety  Goal: Anxiety is at manageable level  Description: Interventions:  - Assess and monitor patient's anxiety level.   - Monitor for signs and symptoms (heart palpitations, chest pain, shortness of breath, headaches, nausea, feeling jumpy, restlessness, irritable, apprehensive).   - Collaborate with interdisciplinary team and initiate plan and interventions as ordered.  - [place] patient to unit/surroundings  - Explain treatment plan  - Encourage participation in care  - Encourage verbalization of concerns/fears  - Identify coping mechanisms  - Assist in developing anxiety-reducing skills  - Administer/offer alternative therapies  - Limit or eliminate stimulants  Outcome: Progressing     Problem: DISCHARGE PLANNING - CARE MANAGEMENT  Goal: Discharge to post-acute care or home with appropriate resources  Description: INTERVENTIONS:  - Conduct assessment to determine patient/family and health care team treatment goals, and need for post-acute services based on payer coverage, community resources, and patient preferences, and barriers to discharge  - Address psychosocial, clinical, and financial barriers to discharge as identified in assessment in conjunction with the patient/family and health care team  - Arrange appropriate level of post-acute services according to patient’s   needs and preference and payer coverage in collaboration with the physician and health care team  - Communicate with and update the patient/family, physician, and health care team regarding progress on the discharge plan  - Arrange appropriate transportation to post-acute venues  Outcome: Progressing     Problem: Knowledge Deficit  Goal: Patient/family/caregiver demonstrates understanding of disease process, treatment plan, medications, and discharge  instructions  Description: Complete learning assessment and assess knowledge base.  Interventions:  - Provide teaching at level of understanding  - Provide teaching via preferred learning methods  Outcome: Progressing     Problem: SLEEP DISTURBANCE  Goal: Will exhibit normal sleeping pattern  Description: Interventions:  -  Assess the patients sleep pattern, noting recent changes  - Administer medication as ordered  - Decrease environmental stimuli, including noise, as appropriate during the night  - Encourage the patient to actively participate in unit groups and or exercise during the day to enhance ability to achieve adequate sleep at night  - Assess the patient, in the morning, encouraging a description of sleep experience  Outcome: Progressing

## 2024-10-02 NOTE — NURSING NOTE
Patient isolative to room and self. Patient visible at times in dayroom. Patient is calm and cooperative upon approach. Patient denies SI/HI/AH/VH. Patient is compliant with meds and meals.

## 2024-10-03 PROCEDURE — 99232 SBSQ HOSP IP/OBS MODERATE 35: CPT | Performed by: PSYCHIATRY & NEUROLOGY

## 2024-10-03 RX ADMIN — CYANOCOBALAMIN TAB 1000 MCG 1000 MCG: 1000 TAB at 08:16

## 2024-10-03 RX ADMIN — ATORVASTATIN CALCIUM 10 MG: 10 TABLET, FILM COATED ORAL at 17:10

## 2024-10-03 RX ADMIN — FAMOTIDINE 20 MG: 20 TABLET ORAL at 17:10

## 2024-10-03 RX ADMIN — SERTRALINE HYDROCHLORIDE 150 MG: 100 TABLET ORAL at 08:16

## 2024-10-03 RX ADMIN — FAMOTIDINE 20 MG: 20 TABLET ORAL at 08:16

## 2024-10-03 RX ADMIN — ARIPIPRAZOLE 5 MG: 5 TABLET ORAL at 08:15

## 2024-10-03 RX ADMIN — LEVOTHYROXINE SODIUM 37.5 MCG: 125 TABLET ORAL at 06:36

## 2024-10-03 RX ADMIN — CHOLECALCIFEROL TAB 25 MCG (1000 UNIT) 2000 UNITS: 25 TAB at 08:15

## 2024-10-03 NOTE — PROGRESS NOTES
10/03/24 0836   Team Meeting   Meeting Type Daily Rounds   Team Members Present   Team Members Present Physician;;Nurse   Physician Team Member Aria   Nursing Team Member [facility]   Care Management Team Member Noa   Patient/Family Present   Patient Present No   Patient's Family Present No     Pt is medication and meal compliant. Pt denies SI/HI/AVH. OT will be contacted again regarding the cognitive evaluation for Pt. Pt is social with select peers. Pt's discharge is pending placement.

## 2024-10-03 NOTE — NURSING NOTE
Pt denies SI/HI/AH/VH. Isolative to room. Medication and meal compliant. Pt is mostly isolative to self. Pt is calm and pleasant. Compliant with Lunch. No further concerns as of present. Plan of care ongoing.

## 2024-10-03 NOTE — PLAN OF CARE
Problem: Ineffective Coping  Goal: Identifies ineffective coping skills  Outcome: Progressing  Goal: Identifies healthy coping skills  Outcome: Progressing  Goal: Demonstrates healthy coping skills  Outcome: Progressing     Problem: Risk for Self Injury/Neglect  Goal: Treatment Goal: Remain safe during length of stay, learn and adopt new coping skills, and be free of self-injurious ideation, impulses and acts at the time of discharge  Outcome: Progressing

## 2024-10-03 NOTE — PROGRESS NOTES
Progress Note - Behavioral Health   Name: Franco Roberson 39 y.o. male I MRN: [id number]  Unit/Bed#: -02 I Date of Admission: 5/14/2024   Date of Service: 10/3/2024 I Hospital Day: 142     Assessment & Plan  MDD (major depressive disorder), recurrent severe, without psychosis (HCC)  No medications changes at this time, will continue to monitor and optimize as indicated:  Abilify 5 mg daily as mood adjunct   Zoloft 150 mg daily for depression and anxiety    Continue to encourage participation in group therapy, milieu therapy and occupational therapy.  Continue to assess for side effects of medications.  Continue collaboration with [facility] for medical co-morbidities as indicated.  Continue discussion with CM/SW to assist with obtaining collateral, disposition planning, and the implementation of patient-centered individualized plan of care.  Continue frequent safety checks and vitals per unit protocol.    Risks, benefits and possible side effects of Medications: Risks, benefits, and possible side effects of medications have previously been explained. No new medications at this time.      Legal status: 201    Disposition: to be determined, pending SOAR application for potential group home placement. OT Cognitive Evaluation ordered to determine pts level of cognitive ability to live independently.      Autism spectrum disorder  Continue supportive care    Progress Toward Goals: pending disposition    Recommended Treatment: Continue with group therapy, milieu therapy and occupational therapy.      Risks, benefits and possible side effects of Medications:   Risks, benefits, and possible side effects of medications explained to patient and patient verbalizes understanding.      History of Present Illness   Behavior over the last 24 hours:  unchanged  Sleep: normal  Appetite: normal  Medication side effects: No  ROS: no complaints    Subjective:Patient seen in room prior to breakfast, he is still maintaining eye contact and  "cooperative with unit. He talked about his OT evaluation and is hopefull the insurance application goes through so he can get his funding for placement in [place]. Still with intermittent group attendance but overall cooperative with unit rules and expectations. Per RN report he remains isolative to self but hopefull and needs encouragement for ADL.     Objective   Mental Status Evaluation:  Appearance:  age appropriate, casually dressed, marginal hygiene   Behavior:  pleasant, cooperative, calm   Speech:  normal rate and volume   Mood:  \"My medications are doing what they should for awhile now\"   Affect:  constricted, more reactive today   Thought Process:  logical, coherent, goal directed   Associations: concrete associations   Thought Content:  no overt delusions   Perceptual Disturbances: no auditory hallucinations, no visual hallucinations, does not appear responding to internal stimuli   Risk Potential: Suicidal ideation - None at present  Homicidal ideation - None at present  Potential for aggression - No   Sensorium:  oriented to person, place, and time/date   Memory:  recent and remote memory grossly intact   Consciousness:  alert and awake   Attention/Concentration: attention span and concentration are age appropriate   Insight:  limited   Judgment: limited   Gait/Station: normal gait/station   Motor Activity: no abnormal movements       Medications: all current active meds have been reviewed, continue current psychiatric medications, and current meds:   Current Facility-Administered Medications:     acetaminophen (TYLENOL) tablet 650 mg, Q6H PRN    acetaminophen (TYLENOL) tablet 650 mg, Q4H PRN    acetaminophen (TYLENOL) tablet 975 mg, Q6H PRN    aluminum-magnesium hydroxide-simethicone (MAALOX) oral suspension 30 mL, Q4H PRN    ARIPiprazole (ABILIFY) tablet 5 mg, Daily    Artificial Tears ophthalmic solution 1 drop, Q3H PRN    atorvastatin (LIPITOR) tablet 10 mg, Daily With Dinner    benztropine (COGENTIN) " injection 1 mg, Q4H PRN Max 6/day    benztropine (COGENTIN) tablet 1 mg, Q4H PRN Max 6/day    Cholecalciferol (VITAMIN D3) tablet 2,000 Units, Daily    cyanocobalamin (VITAMIN B-12) tablet 1,000 mcg, Daily    Diclofenac Sodium (VOLTAREN) 1 % topical gel 2 g, 4x Daily PRN    hydrOXYzine HCL (ATARAX) tablet 50 mg, Q6H PRN Max 4/day **OR** diphenhydrAMINE (BENADRYL) injection 50 mg, Q6H PRN    diphenhydrAMINE-zinc acetate (BENADRYL) 2-0.1 % cream, Daily PRN    famotidine (PEPCID) tablet 20 mg, BID    hydrOXYzine HCL (ATARAX) tablet 100 mg, Q6H PRN Max 4/day **OR** LORazepam (ATIVAN) injection 2 mg, Q6H PRN    hydrOXYzine HCL (ATARAX) tablet 25 mg, Q6H PRN Max 4/day    levothyroxine tablet 37.5 mcg, Early Morning    melatonin tablet 6 mg, HS PRN    methocarbamol (ROBAXIN) tablet 500 mg, Q6H PRN    OLANZapine (ZyPREXA) tablet 5 mg, Q4H PRN Max 3/day **OR** OLANZapine (ZyPREXA) IM injection 2.5 mg, Q4H PRN Max 3/day    OLANZapine (ZyPREXA) tablet 5 mg, Q3H PRN Max 3/day **OR** OLANZapine (ZyPREXA) IM injection 5 mg, Q3H PRN Max 3/day    OLANZapine (ZyPREXA) tablet 2.5 mg, Q4H PRN Max 6/day    polyethylene glycol (MIRALAX) packet 17 g, Daily PRN    propranolol (INDERAL) tablet 10 mg, Q8H PRN    senna-docusate sodium (SENOKOT S) 8.6-50 mg per tablet 1 tablet, Daily PRN    sertraline (ZOLOFT) tablet 150 mg, Daily    traZODone (DESYREL) tablet 50 mg, HS PRN.      Lab Results: I have reviewed the following results:  Most Recent Labs:   Lab Results   Component Value Date    WBC 7.01 06/27/2024    RBC 4.54 06/27/2024    HGB 14.5 06/27/2024    HCT 44.4 06/27/2024     06/27/2024    RDW 12.0 06/27/2024    NEUTROABS 4.01 06/27/2024    SODIUM 139 07/10/2024    K 3.8 07/10/2024     07/10/2024    CO2 27 07/10/2024    BUN 16 07/10/2024    CREATININE 0.92 07/10/2024    GLUC 87 07/10/2024    GLUF 87 07/10/2024    CALCIUM 9.3 07/10/2024    AST 28 07/10/2024    ALT 36 07/10/2024    ALKPHOS 66 07/10/2024    TP 7.7 07/10/2024     ALB 4.4 07/10/2024    TBILI 0.55 07/10/2024    CHOLESTEROL 168 09/19/2024    HDL 42 09/19/2024    TRIG 137 09/19/2024    LDLCALC 99 09/19/2024    NONHDLC 126 09/19/2024    [id number] 6.241 (H) 09/10/2024    FREET4 0.56 (L) 09/10/2024       Administrative Statements   I have spent a total time of 25 minutes in caring for this patient on the day of the visit/encounter including Documenting in the medical record, Obtaining or reviewing history  , and Communicating with other healthcare professionals .

## 2024-10-03 NOTE — PROGRESS NOTES
Progress Note - Behavioral Health   Name: Franco Roberson 39 y.o. male I MRN: [id number]  Unit/Bed#: -02 I Date of Admission: 5/14/2024   Date of Service: 10/3/2024 I Hospital Day: 142     Assessment & Plan  MDD (major depressive disorder), recurrent severe, without psychosis (HCC)  No medications changes at this time, will continue to monitor and optimize as indicated:  Abilify 5 mg daily as mood adjunct   Zoloft 150 mg daily for depression and anxiety    Continue to encourage participation in group therapy, milieu therapy and occupational therapy.  Continue to assess for side effects of medications.  Continue collaboration with [facility] for medical co-morbidities as indicated.  Continue discussion with CM/SW to assist with obtaining collateral, disposition planning, and the implementation of patient-centered individualized plan of care.  Continue frequent safety checks and vitals per unit protocol.    Risks, benefits and possible side effects of Medications: Risks, benefits, and possible side effects of medications have previously been explained. No new medications at this time.      Legal status: 201    Disposition: to be determined, pending SOAR application for potential group home placement. OT Cognitive Evaluation ordered to determine pts level of cognitive ability to live independently.      Autism spectrum disorder  Continue supportive care    Current medications:  Current Facility-Administered Medications   Medication Dose Route Frequency Provider Last Rate    acetaminophen  650 mg Oral Q6H PRN Basilio Brown MD      acetaminophen  650 mg Oral Q4H PRN Basilio Brown MD      acetaminophen  975 mg Oral Q6H PRN Basilio Brown MD      aluminum-magnesium hydroxide-simethicone  30 mL Oral Q4H PRN Basilio Brown MD      ARIPiprazole  5 mg Oral Daily Basilio Panda MD      Artificial Tears  1 drop Both Eyes Q3H PRN Basilio Brown MD      atorvastatin  10 mg Oral Daily With Dinner  WALLY Baptiste      benztropine  1 mg Intramuscular Q4H PRN Max 6/day Basilio Brown MD      benztropine  1 mg Oral Q4H PRN Max 6/day Basilio Brown MD      Cholecalciferol  2,000 Units Oral Daily Laura Clark WALLY Ashford      cyanocobalamin  1,000 mcg Oral Daily Laura WALLY Olmos      Diclofenac Sodium  2 g Topical 4x Daily PRN LauraWALLY Tony      hydrOXYzine HCL  50 mg Oral Q6H PRN Max 4/day Basilio Brown MD      Or    diphenhydrAMINE  50 mg Intramuscular Q6H PRN Basilio Brown MD      diphenhydrAMINE-zinc acetate   Topical Daily PRN Raj Guerrero MD      famotidine  20 mg Oral BID Laura WALLY Olmos      hydrOXYzine HCL  100 mg Oral Q6H PRN Max 4/day Basilio Brown MD      Or    LORazepam  2 mg Intramuscular Q6H PRN Basilio Brown MD      hydrOXYzine HCL  25 mg Oral Q6H PRN Max 4/day Basilio Brown MD      levothyroxine  37.5 mcg Oral Early Morning WALLY Baptiste      melatonin  6 mg Oral HS PRN WALLY Gomez      methocarbamol  500 mg Oral Q6H PRN WALLY Baptiste      OLANZapine  5 mg Oral Q4H PRN Max 3/day Basilio Brown MD      Or    OLANZapine  2.5 mg Intramuscular Q4H PRN Max 3/day Basilio Brown MD      OLANZapine  5 mg Oral Q3H PRN Max 3/day Basilio Brown MD      Or    OLANZapine  5 mg Intramuscular Q3H PRN Max 3/day Basilio Brown MD      OLANZapine  2.5 mg Oral Q4H PRN Max 6/day Basilio Brown MD      polyethylene glycol  17 g Oral Daily PRN Basilio Brown MD      propranolol  10 mg Oral Q8H PRN Basilio Brown MD      senna-docusate sodium  1 tablet Oral Daily PRN Basilio Brown MD      sertraline  150 mg Oral Daily WALLY Gomez      traZODone  50 mg Oral HS PRN Basilio Brown MD         Risks / Benefits of Treatment:    Risks, benefits, and possible side effects of medications explained to patient and patient  verbalizes understanding and agreement for treatment.    Subjective:    Behavior over the last 24 hours: unchanged.     The patient was evaluated this morning for continuity of care and no acute distress noted throughout the evaluation.  Over the past 24 hours staff noted the patient was med compliant, no changes to his behavior on the unit..      Today on exam the patient was laying in bed and eating breakfast and reports that nothing has changed for him.  He reports that he is doing pretty well and continues to participate in milieu and group therapy.  He denies any changes to his sleep or appetite, denies any symptoms of depression or anxiety.    Sleep: normal  Appetite: normal  Medication side effects: No   ROS: no complaints    Mental Status Evaluation:    Appearance:  age appropriate, casually dressed, dressed appropriately, marginal hygiene   Behavior:  pleasant, cooperative   Speech:  normal rate and volume   Mood:  euthymic   Affect:  constricted   Thought Process:  organized, coherent, goal directed   Associations: concrete associations   Thought Content:  no overt delusions   Perceptual Disturbances: no auditory hallucinations, no visual hallucinations, does not appear responding to internal stimuli   Risk Potential: Suicidal ideation - None at present  Homicidal ideation - None at present  Potential for aggression - No   Sensorium:  oriented to person, place, and time/date   Memory:  recent and remote memory grossly intact   Consciousness:  alert and awake   Attention/Concentration: attention span and concentration are age appropriate   Insight:  limited   Judgment: limited   Gait/Station: normal gait/station   Motor Activity: no abnormal movements     Vital signs in last 24 hours:    Temp:  [96.6 °F (35.9 °C)-97 °F (36.1 °C)] 97 °F (36.1 °C)  HR:  [75-81] 81  BP: (108-125)/(62-67) 125/62  Resp:  [16] 16  SpO2:  [95 %-97 %] 95 %  O2 Device: None (Room air)         Laboratory results: I have personally  reviewed all pertinent laboratory/tests results    Most Recent Labs:   Lab Results   Component Value Date    WBC 7.01 06/27/2024    RBC 4.54 06/27/2024    HGB 14.5 06/27/2024    HCT 44.4 06/27/2024     06/27/2024    RDW 12.0 06/27/2024    NEUTROABS 4.01 06/27/2024    SODIUM 139 07/10/2024    K 3.8 07/10/2024     07/10/2024    CO2 27 07/10/2024    BUN 16 07/10/2024    CREATININE 0.92 07/10/2024    GLUC 87 07/10/2024    CALCIUM 9.3 07/10/2024    AST 28 07/10/2024    ALT 36 07/10/2024    ALKPHOS 66 07/10/2024    TP 7.7 07/10/2024    ALB 4.4 07/10/2024    TBILI 0.55 07/10/2024    CHOLESTEROL 168 09/19/2024    HDL 42 09/19/2024    TRIG 137 09/19/2024    LDLCALC 99 09/19/2024    NONHDLC 126 09/19/2024    [id number] 6.241 (H) 09/10/2024    FREET4 0.56 (L) 09/10/2024    SYPHILISAB Non-reactive 05/15/2024       Progress Toward Goals: Franco is currently assessed as being at their baseline with continued need for medication management, supervision for safety and ADL’s. These services are not currently available in a less restrictive environment necessitating continued hospital stay.  Franco should remain on the unit until these services are available, due to likelihood of mental decompensation and readmission if discharged to an unsupervised setting.  Assertive discharge planning and collaboration with multiple providers (inpatient and community based) remains ongoing.  Franco is currently awaiting for group home placement      Counseling / Coordination of Care:    Administrative Statements   I have spent a total time of 20 minutes in caring for this patient on the day of the visit/encounter including Counseling / Coordination of care, Documenting in the medical record, and Obtaining or reviewing history  .    Lita Knapp DO 10/03/24    This note was completed in part utilizing Dragon dictation Software. Grammatical, translation, syntax errors, random word insertions, spelling mistakes, and incomplete  sentences may be an occasional consequence of this system secondary to software limitations with voice recognition, ambient noise, and hardware issues. If you have any questions or concerns about the content, text, or information contained within the body of this dictation, please contact the provider for clarification.

## 2024-10-04 PROCEDURE — 99232 SBSQ HOSP IP/OBS MODERATE 35: CPT | Performed by: PSYCHIATRY & NEUROLOGY

## 2024-10-04 PROCEDURE — 97167 OT EVAL HIGH COMPLEX 60 MIN: CPT

## 2024-10-04 RX ADMIN — ARIPIPRAZOLE 5 MG: 5 TABLET ORAL at 08:10

## 2024-10-04 RX ADMIN — CYANOCOBALAMIN TAB 1000 MCG 1000 MCG: 1000 TAB at 08:10

## 2024-10-04 RX ADMIN — LEVOTHYROXINE SODIUM 37.5 MCG: 125 TABLET ORAL at 06:32

## 2024-10-04 RX ADMIN — FAMOTIDINE 20 MG: 20 TABLET ORAL at 08:10

## 2024-10-04 RX ADMIN — ATORVASTATIN CALCIUM 10 MG: 10 TABLET, FILM COATED ORAL at 17:04

## 2024-10-04 RX ADMIN — FAMOTIDINE 20 MG: 20 TABLET ORAL at 17:04

## 2024-10-04 RX ADMIN — CHOLECALCIFEROL TAB 25 MCG (1000 UNIT) 2000 UNITS: 25 TAB at 08:10

## 2024-10-04 RX ADMIN — SERTRALINE HYDROCHLORIDE 150 MG: 100 TABLET ORAL at 08:10

## 2024-10-04 NOTE — ASSESSMENT & PLAN NOTE
"No medications changes at this time, will continue to monitor and optimize as indicated:  Abilify 5 mg daily as mood adjunct   Zoloft 150 mg daily for depression and anxiety    Continue to encourage participation in group therapy, milieu therapy and occupational therapy.  Continue to assess for side effects of medications.  Continue collaboration with [facility] for medical co-morbidities as indicated.  Continue discussion with CM/SW to assist with obtaining collateral, disposition planning, and the implementation of patient-centered individualized plan of care.  Continue frequent safety checks and vitals per unit protocol.    Risks, benefits and possible side effects of Medications: Risks, benefits, and possible side effects of medications have previously been explained. No new medications at this time.      Legal status: 201    Disposition: to be determined, pending SOAR application for potential group home placement. OT Cognitive Evaluation completed: \"Pt would benefit from discharge to a supportive environment that can provide checks for safety and compliance with IADLs. \"    "

## 2024-10-04 NOTE — PROGRESS NOTES
"  Progress Note - Behavioral Health   Name: Franco Roberson 39 y.o. male I MRN: [id number]  Unit/Bed#: -02 I Date of Admission: 5/14/2024   Date of Service: 10/4/2024 I Hospital Day: 143     Assessment & Plan  MDD (major depressive disorder), recurrent severe, without psychosis (HCC)  No medications changes at this time, will continue to monitor and optimize as indicated:  Abilify 5 mg daily as mood adjunct   Zoloft 150 mg daily for depression and anxiety    Continue to encourage participation in group therapy, milieu therapy and occupational therapy.  Continue to assess for side effects of medications.  Continue collaboration with [facility] for medical co-morbidities as indicated.  Continue discussion with CM/SW to assist with obtaining collateral, disposition planning, and the implementation of patient-centered individualized plan of care.  Continue frequent safety checks and vitals per unit protocol.    Risks, benefits and possible side effects of Medications: Risks, benefits, and possible side effects of medications have previously been explained. No new medications at this time.      Legal status: 201    Disposition: to be determined, pending SOAR application for potential group home placement. OT Cognitive Evaluation completed: \"Pt would benefit from discharge to a supportive environment that can provide checks for safety and compliance with IADLs. \"    Autism spectrum disorder  Continue supportive care    Current medications:  Current Facility-Administered Medications   Medication Dose Route Frequency Provider Last Rate    acetaminophen  650 mg Oral Q6H PRN Basilio Brown MD      acetaminophen  650 mg Oral Q4H PRN Basilio Brown MD      acetaminophen  975 mg Oral Q6H PRN Basilio Brown MD      aluminum-magnesium hydroxide-simethicone  30 mL Oral Q4H PRN Basilio Brown MD      ARIPiprazole  5 mg Oral Daily Basilio Panda MD      Artificial Tears  1 drop Both Eyes Q3H PRN Basilio Garcia" MD Karyn      atorvastatin  10 mg Oral Daily With Dinner Lauraainsley Romegrady Ashford, CRDARIEN      benztropine  1 mg Intramuscular Q4H PRN Max 6/day Basilio Brown MD      benztropine  1 mg Oral Q4H PRN Max 6/day Basilio Brown MD      Cholecalciferol  2,000 Units Oral Daily Laura Diasomid, CRNP      cyanocobalamin  1,000 mcg Oral Daily Laura Clark Makeda CRNP      Diclofenac Sodium  2 g Topical 4x Daily PRN Laura Ambergrady Ashford, CRNP      hydrOXYzine HCL  50 mg Oral Q6H PRN Max 4/day Basilio Brown MD      Or    diphenhydrAMINE  50 mg Intramuscular Q6H PRN Basilio Brown MD      diphenhydrAMINE-zinc acetate   Topical Daily PRN Raj Guerrero MD      famotidine  20 mg Oral BID Laura Clark Makeda, CRNP      hydrOXYzine HCL  100 mg Oral Q6H PRN Max 4/day Basilio Brown MD      Or    LORazepam  2 mg Intramuscular Q6H PRN Basilio Brown MD      hydrOXYzine HCL  25 mg Oral Q6H PRN Max 4/day Basilio Brown MD      levothyroxine  37.5 mcg Oral Early Morning Laura Clark Makeda, ARJUNNP      melatonin  6 mg Oral HS PRN WALLY Gomez      methocarbamol  500 mg Oral Q6H PRN Lauraainsley Ashford CRNP      OLANZapine  5 mg Oral Q4H PRN Max 3/day Basilio Brown MD      Or    OLANZapine  2.5 mg Intramuscular Q4H PRN Max 3/day Basilio Brown MD      OLANZapine  5 mg Oral Q3H PRN Max 3/day Basilio Brown MD      Or    OLANZapine  5 mg Intramuscular Q3H PRN Max 3/day Basilio Brown MD      OLANZapine  2.5 mg Oral Q4H PRN Max 6/day Basilio Brown MD      polyethylene glycol  17 g Oral Daily PRN Basilio Brown MD      propranolol  10 mg Oral Q8H PRN Basilio Brown MD      senna-docusate sodium  1 tablet Oral Daily PRN Basilio Brown MD      sertraline  150 mg Oral Daily WALLY Gomez      traZODone  50 mg Oral HS PRN Basilio Brown MD         Risks / Benefits of Treatment:    Risks, benefits, and possible side  "effects of medications explained to patient and patient verbalizes understanding and agreement for treatment.    Subjective:    Behavior over the last 24 hours: unchanged.     The patient was evaluated this morning for continuity of care and no acute distress noted throughout the evaluation.  Over the past 24 hours staff noted the patient was cooperative, calm, denying all symptoms.  Patient spends most of his time in his bedroom.      Today on exam the patient was sitting in bed and eating breakfast.  He states that his mood is \"pretty good.\"  He states he was able to sleep well, denies any side effects to medication, denies any depression symptoms, denies any anxiety, denies SI and reports feeling safe on the unit.     Sleep: normal  Appetite: normal  Medication side effects: No   ROS: no complaints    Mental Status Evaluation:    Appearance:  age appropriate, casually dressed, marginal hygiene   Behavior:  pleasant, cooperative, calm   Speech:  normal rate and volume   Mood:  euthymic, pretty good   Affect:  constricted, more reactive today   Thought Process:  logical, coherent, goal directed   Associations: concrete associations   Thought Content:  no overt delusions   Perceptual Disturbances: no auditory hallucinations, no visual hallucinations, does not appear responding to internal stimuli   Risk Potential: Suicidal ideation - None at present  Homicidal ideation - None at present  Potential for aggression - No   Sensorium:  oriented to person, place, and time/date   Memory:  recent and remote memory grossly intact   Consciousness:  alert and awake   Attention/Concentration: attention span and concentration are age appropriate   Insight:  limited   Judgment: limited   Gait/Station: normal gait/station   Motor Activity: no abnormal movements     Vital signs in last 24 hours:    Temp:  [96.4 °F (35.8 °C)-97 °F (36.1 °C)] 96.4 °F (35.8 °C)  HR:  [69-81] 69  BP: (116-125)/(62-67) 116/67  Resp:  [16] 16  SpO2:  [74 " %-95 %] 74 %  O2 Device: None (Room air)         Laboratory results: I have personally reviewed all pertinent laboratory/tests results    Most Recent Labs:   Lab Results   Component Value Date    WBC 7.01 06/27/2024    RBC 4.54 06/27/2024    HGB 14.5 06/27/2024    HCT 44.4 06/27/2024     06/27/2024    RDW 12.0 06/27/2024    NEUTROABS 4.01 06/27/2024    SODIUM 139 07/10/2024    K 3.8 07/10/2024     07/10/2024    CO2 27 07/10/2024    BUN 16 07/10/2024    CREATININE 0.92 07/10/2024    GLUC 87 07/10/2024    CALCIUM 9.3 07/10/2024    AST 28 07/10/2024    ALT 36 07/10/2024    ALKPHOS 66 07/10/2024    TP 7.7 07/10/2024    ALB 4.4 07/10/2024    TBILI 0.55 07/10/2024    CHOLESTEROL 168 09/19/2024    HDL 42 09/19/2024    TRIG 137 09/19/2024    LDLCALC 99 09/19/2024    NONHDLC 126 09/19/2024    [id number] 6.241 (H) 09/10/2024    FREET4 0.56 (L) 09/10/2024    SYPHILISAB Non-reactive 05/15/2024       Progress Toward Goals: Franco is currently assessed as being at their baseline with continued need for medication management, supervision for safety and ADL’s. These services are not currently available in a less restrictive environment necessitating continued hospital stay.  Franco should remain on the unit until these services are available, due to likelihood of mental decompensation and readmission if discharged to an unsupervised setting.  Assertive discharge planning and collaboration with multiple providers (inpatient and community based) remains ongoing.  Franco is currently awaiting for group home placement.    Counseling / Coordination of Care:    Administrative Statements   I have spent a total time of 30 minutes in caring for this patient on the day of the visit/encounter including Counseling / Coordination of care, Documenting in the medical record, Reviewing / ordering tests, medicine, procedures  , Obtaining or reviewing history  , and Communicating with other healthcare professionals .    Lita Knapp,  DO 10/04/24    This note was completed in part utilizing Dragon dictation Software. Grammatical, translation, syntax errors, random word insertions, spelling mistakes, and incomplete sentences may be an occasional consequence of this system secondary to software limitations with voice recognition, ambient noise, and hardware issues. If you have any questions or concerns about the content, text, or information contained within the body of this dictation, please contact the provider for clarification.

## 2024-10-04 NOTE — SOCIAL WORK
"Cm spoke with Pt. Cm asked Pt if he has met with Cm's boss these past two days. Pt reported \"no\". Cm informed Pt on that his car is still in the parking lot of [facility] and was not towed. Pt smiled and reacted happily. Pt reported he is surprised. Pt reported he is hoping his belongings are still in the car and Cm encouraged Pt that there is security 24/7 at the hospital and his car is safe. Pt understood.   "

## 2024-10-04 NOTE — NURSING NOTE
Pt is cooperative and pleasant with staff. Denies SI/HI/AH/VH. Pt is isolative to room and self. Pt is calm, soft spoken, and scant with conversation. Medication adherent. Denies any unmet needs at this time. Maintaining continuous rounding for safety.

## 2024-10-04 NOTE — NURSING NOTE
Pt denies SI/HI/AH/VH. Present in dayroom and milieu for brief periods but is mostly isolative to room. Medication and meal compliant. Isolative to self. Scant with communication. No further concerns as of present. Plan of care ongoing.

## 2024-10-04 NOTE — NURSING NOTE
Patient is pleasant and cooperative upon approach. Patient is visible on unit, but mostly isolative to self and room. Patient denies SI/HI/AH/VH. Patient is compliant with meds and meals.

## 2024-10-04 NOTE — OCCUPATIONAL THERAPY NOTE
Occupational Therapy Cognitive Evaluation     Patient Name: Franco Roberson  Today's Date: 10/4/2024  Problem List  Principal Problem:    MDD (major depressive disorder), recurrent severe, without psychosis (HCC)  Active Problems:    Unspecified depressive disorder (HCC)    Medical clearance for psychiatric admission    Hyperlipidemia    Vitamin D deficiency    B12 deficiency    Hypothyroidism    Autism spectrum disorder    Past Medical History  Past Medical History:   Diagnosis Date    Depression     Psychiatric disorder     Suicide attempt (HCC)      Past Surgical History  No past surgical history on file.        10/04/24 1115   OT Last Visit   OT Visit Date 10/04/24   Note Type   Note type Evaluation   Pain Assessment   Pain Assessment Tool 0-10   Pain Score No Pain   Restrictions/Precautions   Weight Bearing Precautions Per Order No   Other Precautions Pain   Home Living   Type of Home Apartment   Prior Function   Level of [place] Independent with ADLs;Needs assistance with IADLS   ADL   Grooming Assistance 6  Modified Independent   UB Bathing Assistance 6  Modified Independent   LB Bathing Assistance 6  Modified Independent   UB Dressing Assistance 6  Modified independent   LB Dressing Assistance 6  Modified independent   Toileting Assistance  6  Modified independent   Transfers   Sit to Stand 7  Independent   Stand to Sit 7  Independent   Toilet transfer 7  Independent   Functional Mobility   Functional Mobility 7  Independent   Balance   Static Sitting Good   Dynamic Sitting Fair +   Static Standing Fair   Dynamic Standing Fair   Ambulatory Fair   Activity Tolerance   Activity Tolerance Patient tolerated treatment well   Cognition   Arousal/Participation Alert;Cooperative   Attention Within functional limits   Orientation Level Oriented X4   Memory Within functional limits   Cognition Assessment Tools ACLS   Score   5.2    Administered [place] Cognitive Level Screen (ACLS).  The patient scored 5.2/6.0  indicating that they may live alone with checks to monitor safety and assist with finances.      Behavior:  Uses watches, calendars and appointment books.  Relies on tangible reminders to keep schedules.  Frequently impulsive.  May appear self-centered.  May make gayathri statements without awareness of social consequences.  May be inflexible.  Often easily frustrated.  Should not be responsible for the care of others.  Grooming:  Completes grooming tasks independently.  May miss dental or hair appointments. May not coordinate makeup with clothing, occasion, or current style.  Dressing:  Recognizes and considers all tangible properties of garments, cleanliness and need for ironing.  May not coordinate clothing with occasion or current style.  May argue with caregiver suggestions to change selection.  Bathing:  Completes routine bathing tasks independently.  Notes tangible features of bathing area like wet floors.  Walking/Exercising:  Notes all visible tangible features of surrounding environment and uses these to guide walking.  Eating:  Notes crumbs and spills in immediate area.  Disposes of trash without being told.  May use socially acceptable table manners.  Monitor any new dietary restrictions for compliance.  Toileting:  Independently performs all toileting.  Can explore a new environment to locate a bathroom without assistance.    Medications:  May have a vague understanding of disorder but confuses symptoms with side effects of drugs.  Utilize a daily pill box set up by someone else.  Use of adaptive equipment:  Attends to tangible properties of equipment like cleanliness, qualities of terrain that effect ambulation.  Uses walker safely on uneven surfaces.  Housekeeping: Solves problems in housekeeping like streaks on windows.  Does not anticipate problems.  May forget solutions.  Follows weekly schedule.  May appear impulsive.  Food preparation:  Makes weekly schedule for grocery shopping but may impulsively  change lists.  Solves problems in food preparation.  Throws away trash as he or she is working.  Spending Money:  May be able to identify monthly budget of routine expenses but may have trouble adhering to it.  May run up bills or overextend credit.  May have trouble balancing checkbook.  Does not plan for long term financial security.  Shopping:  May make daily or weekly schedule of shopping needs with trouble adhering to list.  May be impulsive in spending.  May discriminate between stores, labels and brands.  Laundry:  Notes tangible features of garments like stains or dirt.  Discriminates between fabrics.  Notes water temperature and appropriate detergent.  Traveling:  May be able to read and understand bus schedule.  May impulsively leave on a trip without planning for money or medication or clothing.  Telephone:  Learns new telephone procedures by initiating several steps at a time.  May look up new numbers.  May not use yellow pages.  May become confused by new options or ignore call waiting.  May run up large phone bills.         Assessment   Limitation Decreased high-level ADLs   Assessment   Pt is a 39 y.o. male seen for OT evaluation s/p admit to [facility] on 5/14/2024 w/ MDD (major depressive disorder), recurrent severe, without psychosis (HCC).  See medical history above for extensive list of comorbidities affecting pt's functional performance at time of assessment. Personal factors affecting Pt at time of IE include:health management . Upon evaluation: Pt independent for functional ambulation including bathroom mobility and negotiation of small spaces, independent for ADL transfers.  Pt independent ADLs in standing. Pt pleasant, cooperative. Pt demonstrates good problem-solving in familiar situations, may require cues in novel situations or unfamiliar environments. Pt demonstrated fair health literacy. Pt would benefit from discharge to a supportive environment that can provide checks for safety and  compliance with IADLs.      Plan   OT Frequency Eval only   Discharge Recommendation   Rehab Resource Intensity Level, OT   The patient scored 5.2/6.0 indicating that they may live alone with checks to monitor safety and assist with finances.

## 2024-10-04 NOTE — PROGRESS NOTES
10/04/24 0841   Team Meeting   Meeting Type Daily Rounds   Team Members Present   Team Members Present Physician;Nurse;   Physician Team Member Aria   Nursing Team Member Talita   Care Management Team Member Noa   Patient/Family Present   Patient Present No   Patient's Family Present No     Provider reached out to OT to see Pt. Pt is medication and meal compliant. Pt denies SI/HI/AVH. Pt is isolative to his room. Pt's discharge is pending placement.

## 2024-10-04 NOTE — PLAN OF CARE
Pt attends groups intermittently. Pt is typically quiet, but participates appropriately when prompted.

## 2024-10-04 NOTE — PLAN OF CARE
Problem: Ineffective Coping  Goal: Identifies ineffective coping skills  Outcome: Progressing  Goal: Identifies healthy coping skills  Outcome: Progressing  Goal: Demonstrates healthy coping skills  Outcome: Progressing  Goal: Participates in unit activities  Description: Interventions:  - Provide therapeutic environment   - Provide required programming   - Redirect inappropriate behaviors   Outcome: Progressing     Problem: Risk for Self Injury/Neglect  Goal: Treatment Goal: Remain safe during length of stay, learn and adopt new coping skills, and be free of self-injurious ideation, impulses and acts at the time of discharge  Outcome: Progressing     Problem: Depression  Goal: Treatment Goal: Demonstrate behavioral control of depressive symptoms, verbalize feelings of improved mood/affect, and adopt new coping skills prior to discharge  Outcome: Progressing  Goal: Verbalize thoughts and feelings  Description: Interventions:  - Assess and re-assess patient's level of risk   - Engage patient in 1:1 interactions, daily, for a minimum of 15 minutes   - Encourage patient to express feelings, fears, frustrations, hopes   Outcome: Progressing  Goal: Refrain from harming self  Description: Interventions:  - Monitor patient closely, per order   - Supervise medication ingestion, monitor effects and side effects   Outcome: Progressing  Goal: Attend and participate in unit activities, including therapeutic, recreational, and educational groups  Description: Interventions:  - Provide therapeutic and educational activities daily, encourage attendance and participation, and document same in the medical record   Outcome: Progressing  Goal: Complete daily ADLs, including personal hygiene independently, as able  Description: Interventions:  - Observe, teach, and assist patient with ADLS  -  Monitor and promote a balance of rest/activity, with adequate nutrition and elimination   Outcome: Progressing     Problem: Anxiety  Goal:  Anxiety is at manageable level  Description: Interventions:  - Assess and monitor patient's anxiety level.   - Monitor for signs and symptoms (heart palpitations, chest pain, shortness of breath, headaches, nausea, feeling jumpy, restlessness, irritable, apprehensive).   - Collaborate with interdisciplinary team and initiate plan and interventions as ordered.  - [place] patient to unit/surroundings  - Explain treatment plan  - Encourage participation in care  - Encourage verbalization of concerns/fears  - Identify coping mechanisms  - Assist in developing anxiety-reducing skills  - Administer/offer alternative therapies  - Limit or eliminate stimulants  Outcome: Progressing     Problem: DISCHARGE PLANNING - CARE MANAGEMENT  Goal: Discharge to post-acute care or home with appropriate resources  Description: INTERVENTIONS:  - Conduct assessment to determine patient/family and health care team treatment goals, and need for post-acute services based on payer coverage, community resources, and patient preferences, and barriers to discharge  - Address psychosocial, clinical, and financial barriers to discharge as identified in assessment in conjunction with the patient/family and health care team  - Arrange appropriate level of post-acute services according to patient’s   needs and preference and payer coverage in collaboration with the physician and health care team  - Communicate with and update the patient/family, physician, and health care team regarding progress on the discharge plan  - Arrange appropriate transportation to post-acute venues  Outcome: Progressing     Problem: Knowledge Deficit  Goal: Patient/family/caregiver demonstrates understanding of disease process, treatment plan, medications, and discharge instructions  Description: Complete learning assessment and assess knowledge base.  Interventions:  - Provide teaching at level of understanding  - Provide teaching via preferred learning methods  Outcome:  Progressing     Problem: SLEEP DISTURBANCE  Goal: Will exhibit normal sleeping pattern  Description: Interventions:  -  Assess the patients sleep pattern, noting recent changes  - Administer medication as ordered  - Decrease environmental stimuli, including noise, as appropriate during the night  - Encourage the patient to actively participate in unit groups and or exercise during the day to enhance ability to achieve adequate sleep at night  - Assess the patient, in the morning, encouraging a description of sleep experience  Outcome: Progressing     Problem: Depression  Goal: Refrain from isolation  Description: Interventions:  - Develop a trusting relationship   - Encourage socialization   Outcome: Not Progressing  Goal: Refrain from self-neglect  Outcome: Not Progressing

## 2024-10-05 PROCEDURE — 99232 SBSQ HOSP IP/OBS MODERATE 35: CPT | Performed by: PSYCHIATRY & NEUROLOGY

## 2024-10-05 RX ADMIN — CHOLECALCIFEROL TAB 25 MCG (1000 UNIT) 2000 UNITS: 25 TAB at 08:50

## 2024-10-05 RX ADMIN — ATORVASTATIN CALCIUM 10 MG: 10 TABLET, FILM COATED ORAL at 17:43

## 2024-10-05 RX ADMIN — FAMOTIDINE 20 MG: 20 TABLET ORAL at 17:43

## 2024-10-05 RX ADMIN — ARIPIPRAZOLE 5 MG: 5 TABLET ORAL at 08:50

## 2024-10-05 RX ADMIN — CYANOCOBALAMIN TAB 1000 MCG 1000 MCG: 1000 TAB at 08:50

## 2024-10-05 RX ADMIN — LEVOTHYROXINE SODIUM 37.5 MCG: 125 TABLET ORAL at 06:35

## 2024-10-05 RX ADMIN — SERTRALINE HYDROCHLORIDE 150 MG: 100 TABLET ORAL at 08:50

## 2024-10-05 RX ADMIN — FAMOTIDINE 20 MG: 20 TABLET ORAL at 08:50

## 2024-10-05 NOTE — NURSING NOTE
"Patient is calm with a constricted affect. Denies SI/HI/AVH, depression, or anxiety. Says he feels \"hopeful\" but does not elaborate. No unmet needs currently.   "

## 2024-10-05 NOTE — PLAN OF CARE
Problem: Ineffective Coping  Goal: Identifies ineffective coping skills  Outcome: Progressing  Goal: Identifies healthy coping skills  Outcome: Progressing  Goal: Demonstrates healthy coping skills  Outcome: Progressing  Goal: Participates in unit activities  Description: Interventions:  - Provide therapeutic environment   - Provide required programming   - Redirect inappropriate behaviors   Outcome: Progressing     Problem: Risk for Self Injury/Neglect  Goal: Treatment Goal: Remain safe during length of stay, learn and adopt new coping skills, and be free of self-injurious ideation, impulses and acts at the time of discharge  Outcome: Progressing     Problem: Depression  Goal: Treatment Goal: Demonstrate behavioral control of depressive symptoms, verbalize feelings of improved mood/affect, and adopt new coping skills prior to discharge  Outcome: Progressing  Goal: Verbalize thoughts and feelings  Description: Interventions:  - Assess and re-assess patient's level of risk   - Engage patient in 1:1 interactions, daily, for a minimum of 15 minutes   - Encourage patient to express feelings, fears, frustrations, hopes   Outcome: Progressing  Goal: Refrain from harming self  Description: Interventions:  - Monitor patient closely, per order   - Supervise medication ingestion, monitor effects and side effects   Outcome: Progressing  Goal: Refrain from isolation  Description: Interventions:  - Develop a trusting relationship   - Encourage socialization   Outcome: Progressing  Goal: Refrain from self-neglect  Outcome: Progressing  Goal: Attend and participate in unit activities, including therapeutic, recreational, and educational groups  Description: Interventions:  - Provide therapeutic and educational activities daily, encourage attendance and participation, and document same in the medical record   Outcome: Progressing  Goal: Complete daily ADLs, including personal hygiene independently, as able  Description:  Interventions:  - Observe, teach, and assist patient with ADLS  -  Monitor and promote a balance of rest/activity, with adequate nutrition and elimination   Outcome: Progressing     Problem: Anxiety  Goal: Anxiety is at manageable level  Description: Interventions:  - Assess and monitor patient's anxiety level.   - Monitor for signs and symptoms (heart palpitations, chest pain, shortness of breath, headaches, nausea, feeling jumpy, restlessness, irritable, apprehensive).   - Collaborate with interdisciplinary team and initiate plan and interventions as ordered.  - [place] patient to unit/surroundings  - Explain treatment plan  - Encourage participation in care  - Encourage verbalization of concerns/fears  - Identify coping mechanisms  - Assist in developing anxiety-reducing skills  - Administer/offer alternative therapies  - Limit or eliminate stimulants  Outcome: Progressing     Problem: Knowledge Deficit  Goal: Patient/family/caregiver demonstrates understanding of disease process, treatment plan, medications, and discharge instructions  Description: Complete learning assessment and assess knowledge base.  Interventions:  - Provide teaching at level of understanding  - Provide teaching via preferred learning methods  Outcome: Progressing     Problem: SLEEP DISTURBANCE  Goal: Will exhibit normal sleeping pattern  Description: Interventions:  -  Assess the patients sleep pattern, noting recent changes  - Administer medication as ordered  - Decrease environmental stimuli, including noise, as appropriate during the night  - Encourage the patient to actively participate in unit groups and or exercise during the day to enhance ability to achieve adequate sleep at night  - Assess the patient, in the morning, encouraging a description of sleep experience  Outcome: Progressing

## 2024-10-06 PROCEDURE — 99232 SBSQ HOSP IP/OBS MODERATE 35: CPT | Performed by: PSYCHIATRY & NEUROLOGY

## 2024-10-06 RX ADMIN — ATORVASTATIN CALCIUM 10 MG: 10 TABLET, FILM COATED ORAL at 17:51

## 2024-10-06 RX ADMIN — CHOLECALCIFEROL TAB 25 MCG (1000 UNIT) 2000 UNITS: 25 TAB at 09:03

## 2024-10-06 RX ADMIN — SERTRALINE HYDROCHLORIDE 150 MG: 100 TABLET ORAL at 09:03

## 2024-10-06 RX ADMIN — LEVOTHYROXINE SODIUM 37.5 MCG: 125 TABLET ORAL at 06:07

## 2024-10-06 RX ADMIN — CYANOCOBALAMIN TAB 1000 MCG 1000 MCG: 1000 TAB at 09:02

## 2024-10-06 RX ADMIN — FAMOTIDINE 20 MG: 20 TABLET ORAL at 09:03

## 2024-10-06 RX ADMIN — FAMOTIDINE 20 MG: 20 TABLET ORAL at 17:51

## 2024-10-06 RX ADMIN — ARIPIPRAZOLE 5 MG: 5 TABLET ORAL at 09:03

## 2024-10-06 NOTE — PLAN OF CARE
Problem: Ineffective Coping  Goal: Identifies ineffective coping skills  Outcome: Progressing  Goal: Identifies healthy coping skills  Outcome: Progressing  Goal: Demonstrates healthy coping skills  Outcome: Progressing  Goal: Participates in unit activities  Description: Interventions:  - Provide therapeutic environment   - Provide required programming   - Redirect inappropriate behaviors   Outcome: Not Progressing     Problem: Risk for Self Injury/Neglect  Goal: Treatment Goal: Remain safe during length of stay, learn and adopt new coping skills, and be free of self-injurious ideation, impulses and acts at the time of discharge  Outcome: Progressing     Problem: Depression  Goal: Treatment Goal: Demonstrate behavioral control of depressive symptoms, verbalize feelings of improved mood/affect, and adopt new coping skills prior to discharge  Outcome: Progressing  Goal: Verbalize thoughts and feelings  Description: Interventions:  - Assess and re-assess patient's level of risk   - Engage patient in 1:1 interactions, daily, for a minimum of 15 minutes   - Encourage patient to express feelings, fears, frustrations, hopes   Outcome: Progressing  Goal: Refrain from harming self  Description: Interventions:  - Monitor patient closely, per order   - Supervise medication ingestion, monitor effects and side effects   Outcome: Progressing  Goal: Refrain from isolation  Description: Interventions:  - Develop a trusting relationship   - Encourage socialization   Outcome: Progressing  Goal: Refrain from self-neglect  Outcome: Progressing  Goal: Attend and participate in unit activities, including therapeutic, recreational, and educational groups  Description: Interventions:  - Provide therapeutic and educational activities daily, encourage attendance and participation, and document same in the medical record   Outcome: Not Progressing  Goal: Complete daily ADLs, including personal hygiene independently, as able  Description:  Interventions:  - Observe, teach, and assist patient with ADLS  -  Monitor and promote a balance of rest/activity, with adequate nutrition and elimination   Outcome: Progressing     Problem: Anxiety  Goal: Anxiety is at manageable level  Description: Interventions:  - Assess and monitor patient's anxiety level.   - Monitor for signs and symptoms (heart palpitations, chest pain, shortness of breath, headaches, nausea, feeling jumpy, restlessness, irritable, apprehensive).   - Collaborate with interdisciplinary team and initiate plan and interventions as ordered.  - [place] patient to unit/surroundings  - Explain treatment plan  - Encourage participation in care  - Encourage verbalization of concerns/fears  - Identify coping mechanisms  - Assist in developing anxiety-reducing skills  - Administer/offer alternative therapies  - Limit or eliminate stimulants  Outcome: Progressing     Problem: Knowledge Deficit  Goal: Patient/family/caregiver demonstrates understanding of disease process, treatment plan, medications, and discharge instructions  Description: Complete learning assessment and assess knowledge base.  Interventions:  - Provide teaching at level of understanding  - Provide teaching via preferred learning methods  Outcome: Progressing     Problem: SLEEP DISTURBANCE  Goal: Will exhibit normal sleeping pattern  Description: Interventions:  -  Assess the patients sleep pattern, noting recent changes  - Administer medication as ordered  - Decrease environmental stimuli, including noise, as appropriate during the night  - Encourage the patient to actively participate in unit groups and or exercise during the day to enhance ability to achieve adequate sleep at night  - Assess the patient, in the morning, encouraging a description of sleep experience  Outcome: Progressing

## 2024-10-06 NOTE — NURSING NOTE
"Franco reports a \"pretty good' mood but notes ongoing anxiety related to discharge. Says he fears getting kicked out of the group home and becoming homeless. Spoke about challenging negative thoughts and patient verbalized he does not usually get into trouble so this is likely not an issue. 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 Showered yesterday. No unmet needs currently.    "

## 2024-10-06 NOTE — PROGRESS NOTES
"  Progress Note - Behavioral Health   Name: Franco Roberson 39 y.o. male I MRN: [id number]  Unit/Bed#: -02 I Date of Admission: 5/14/2024   Date of Service: 10/6/2024 I Hospital Day: 145     Assessment & Plan  MDD (major depressive disorder), recurrent severe, without psychosis (HCC)  No medications changes at this time, will continue to monitor and optimize as indicated:  Abilify 5 mg daily as mood adjunct   Zoloft 150 mg daily for depression and anxiety    Continue to encourage participation in group therapy, milieu therapy and occupational therapy.  Continue to assess for side effects of medications.  Continue collaboration with [facility] for medical co-morbidities as indicated.  Continue discussion with CM/SW to assist with obtaining collateral, disposition planning, and the implementation of patient-centered individualized plan of care.  Continue frequent safety checks and vitals per unit protocol.    Risks, benefits and possible side effects of Medications: Risks, benefits, and possible side effects of medications have previously been explained. No new medications at this time.      Legal status: 201    Disposition: to be determined, pending SOAR application for potential group home placement. OT Cognitive Evaluation completed: \"Pt would benefit from discharge to a supportive environment that can provide checks for safety and compliance with IADLs. \"    Autism spectrum disorder  Continue supportive care    Progress Toward Goals: awaiting placement    Recommended Treatment: Continue with group therapy, milieu therapy and occupational therapy.      Risks, benefits and possible side effects of Medications:   Risks, benefits, and possible side effects of medications explained to patient and patient verbalizes understanding.      History of Present Illness   Behavior over the last 24 hours:  unchanged  Sleep: normal  Appetite: normal  Medication side effects: No  ROS: no complaints    Subjective:Per RN report no " issues with medications or unit, reported concern that after dc from here he will get removed from the group home for bad behavior. He talks to this writer about the poetry he now enjoies and how one of the group therapists suggested and encouraged it. He is hoping his SSI can be expedited since he is in the hospital.    Objective   Mental Status Evaluation:  Appearance:  Marginal hygeine   Behavior:  cooperative   Speech:  soft and more spontaneous   Mood:  normal   Affect:  constricted   Thought Process:  goal directed   Associations: concrete associations   Thought Content:  No overt delusions   Perceptual Disturbances: Denies hallucinations   Risk Potential: No suicidal or homicidal thoughts expressed   Sensorium:  person, place, and situation   Memory:  recent and remote memory grossly intact   Consciousness:  alert and awake    Attention: attention span and concentration were age appropriate   Insight:  fair   Judgment: fair   Gait/Station: normal gait/station   Motor Activity: no abnormal movements     Medications: all current active meds have been reviewed.      Lab Results: I have reviewed the following results:  Most Recent Labs:   Lab Results   Component Value Date    WBC 7.01 06/27/2024    RBC 4.54 06/27/2024    HGB 14.5 06/27/2024    HCT 44.4 06/27/2024     06/27/2024    RDW 12.0 06/27/2024    NEUTROABS 4.01 06/27/2024    SODIUM 139 07/10/2024    K 3.8 07/10/2024     07/10/2024    CO2 27 07/10/2024    BUN 16 07/10/2024    CREATININE 0.92 07/10/2024    GLUC 87 07/10/2024    GLUF 87 07/10/2024    CALCIUM 9.3 07/10/2024    AST 28 07/10/2024    ALT 36 07/10/2024    ALKPHOS 66 07/10/2024    TP 7.7 07/10/2024    ALB 4.4 07/10/2024    TBILI 0.55 07/10/2024    CHOLESTEROL 168 09/19/2024    HDL 42 09/19/2024    TRIG 137 09/19/2024    LDLCALC 99 09/19/2024    NONHDLC 126 09/19/2024    [id number] 6.241 (H) 09/10/2024    FREET4 0.56 (L) 09/10/2024       Administrative Statements   I have spent a  total time of 30 minutes in caring for this patient on the day of the visit/encounter including Importance of tx compliance, Documenting in the medical record, Reviewing / ordering tests, medicine, procedures  , and Communicating with other healthcare professionals .

## 2024-10-06 NOTE — NURSING NOTE
Patient was in the milieu briefly this evening but did not stay out. The remainder of the evening he has been in his room. He denies S.I.H.I.A/H V/H

## 2024-10-07 PROCEDURE — 99232 SBSQ HOSP IP/OBS MODERATE 35: CPT | Performed by: PSYCHIATRY & NEUROLOGY

## 2024-10-07 RX ADMIN — FAMOTIDINE 20 MG: 20 TABLET ORAL at 08:10

## 2024-10-07 RX ADMIN — CYANOCOBALAMIN TAB 1000 MCG 1000 MCG: 1000 TAB at 08:10

## 2024-10-07 RX ADMIN — ATORVASTATIN CALCIUM 10 MG: 10 TABLET, FILM COATED ORAL at 17:16

## 2024-10-07 RX ADMIN — SERTRALINE HYDROCHLORIDE 150 MG: 100 TABLET ORAL at 08:09

## 2024-10-07 RX ADMIN — ARIPIPRAZOLE 5 MG: 5 TABLET ORAL at 08:10

## 2024-10-07 RX ADMIN — FAMOTIDINE 20 MG: 20 TABLET ORAL at 17:16

## 2024-10-07 RX ADMIN — LEVOTHYROXINE SODIUM 37.5 MCG: 125 TABLET ORAL at 06:21

## 2024-10-07 RX ADMIN — CHOLECALCIFEROL TAB 25 MCG (1000 UNIT) 2000 UNITS: 25 TAB at 08:09

## 2024-10-07 NOTE — PROGRESS NOTES
"  Progress Note - Behavioral Health   Name: Franco Roberson 39 y.o. male I MRN: [id number]  Unit/Bed#: -02 I Date of Admission: 5/14/2024   Date of Service: 10/7/2024 I Hospital Day: 146     Assessment & Plan  MDD (major depressive disorder), recurrent severe, without psychosis (HCC)  No medications changes at this time, will continue to monitor and optimize as indicated:  Abilify 5 mg daily as mood adjunct   Zoloft 150 mg daily for depression and anxiety    Continue to encourage participation in group therapy, milieu therapy and occupational therapy.  Continue to assess for side effects of medications.  Continue collaboration with [facility] for medical co-morbidities as indicated.  Continue discussion with CM/SW to assist with obtaining collateral, disposition planning, and the implementation of patient-centered individualized plan of care.  Continue frequent safety checks and vitals per unit protocol.    Risks, benefits and possible side effects of Medications: Risks, benefits, and possible side effects of medications have previously been explained. No new medications at this time.      Legal status: 201    Disposition: to be determined, pending SOAR application for potential group home placement. OT Cognitive Evaluation completed: \"Pt would benefit from discharge to a supportive environment that can provide checks for safety and compliance with IADLs. \"    Autism spectrum disorder  Continue supportive care    Current medications:  Current Facility-Administered Medications   Medication Dose Route Frequency Provider Last Rate    acetaminophen  650 mg Oral Q6H PRN Basilio Brown MD      acetaminophen  650 mg Oral Q4H PRN Basilio Brown MD      acetaminophen  975 mg Oral Q6H PRN Basilio Brown MD      aluminum-magnesium hydroxide-simethicone  30 mL Oral Q4H PRN Basilio Brown MD      ARIPiprazole  5 mg Oral Daily Basilio Panda MD      Artificial Tears  1 drop Both Eyes Q3H PRN Basilio Garcia" MD Karyn      atorvastatin  10 mg Oral Daily With Dinner Lauraainsley Romegrady Ashford, CRDARIEN      benztropine  1 mg Intramuscular Q4H PRN Max 6/day Basilio Brown MD      benztropine  1 mg Oral Q4H PRN Max 6/day Basilio Brown MD      Cholecalciferol  2,000 Units Oral Daily Laura Diasomid, CRNP      cyanocobalamin  1,000 mcg Oral Daily Laura Clark Makeda CRNP      Diclofenac Sodium  2 g Topical 4x Daily PRN Laura Ambergrady Ashford, CRNP      hydrOXYzine HCL  50 mg Oral Q6H PRN Max 4/day Basilio Brown MD      Or    diphenhydrAMINE  50 mg Intramuscular Q6H PRN Basilio Brown MD      diphenhydrAMINE-zinc acetate   Topical Daily PRN Raj Guerrero MD      famotidine  20 mg Oral BID Laura Clark Makeda, CRNP      hydrOXYzine HCL  100 mg Oral Q6H PRN Max 4/day Basilio Brown MD      Or    LORazepam  2 mg Intramuscular Q6H PRN Basilio Brown MD      hydrOXYzine HCL  25 mg Oral Q6H PRN Max 4/day Basilio Brown MD      levothyroxine  37.5 mcg Oral Early Morning Laura Clark Makeda, ARJUNNP      melatonin  6 mg Oral HS PRN WALLY Gomez      methocarbamol  500 mg Oral Q6H PRN Lauraainsley Ashford CRNP      OLANZapine  5 mg Oral Q4H PRN Max 3/day Basilio Brown MD      Or    OLANZapine  2.5 mg Intramuscular Q4H PRN Max 3/day Basilio Brown MD      OLANZapine  5 mg Oral Q3H PRN Max 3/day Basilio Brown MD      Or    OLANZapine  5 mg Intramuscular Q3H PRN Max 3/day Basilio Brown MD      OLANZapine  2.5 mg Oral Q4H PRN Max 6/day Basilio Brown MD      polyethylene glycol  17 g Oral Daily PRN Basilio Brown MD      propranolol  10 mg Oral Q8H PRN Basilio Brown MD      senna-docusate sodium  1 tablet Oral Daily PRN Basilio Brown MD      sertraline  150 mg Oral Daily WALLY Gomez      traZODone  50 mg Oral HS PRN Basilio Brown MD         Risks / Benefits of Treatment:    Risks, benefits, and possible side  "effects of medications explained to patient and patient verbalizes understanding and agreement for treatment.    Subjective:    Behavior over the last 24 hours: unchanged.     The patient was evaluated this morning for continuity of care and no acute distress noted throughout the evaluation.  Over the past 24 hours staff noted the patient was compliant with medications, he expressed being nervous about getting kicked out of a potential group home, describes occasional negative thinking to staff..      Today on exam the patient was walking in the hallways and reports that he completed the OT cognitive eval with the occupational therapist.  He states that he believes his  is working on social security application so that he can be discharged to a group home.  Today he denies any issues with depression or anxiety, denies suicidal thoughts.    Sleep: normal  Appetite: normal  Medication side effects: No   ROS: no complaints    Mental Status Evaluation:    Appearance:  age appropriate, casually dressed, looks stated age   Behavior:  pleasant, cooperative, calm   Speech:  normal rate and volume, scant   Mood:  \"Good\"   Affect:  constricted   Thought Process:  organized, logical, coherent, goal directed, linear   Associations: concrete associations   Thought Content:  no overt delusions   Perceptual Disturbances: no auditory hallucinations, no visual hallucinations, does not appear responding to internal stimuli   Risk Potential: Suicidal ideation - None at present  Homicidal ideation - None at present  Potential for aggression - No   Sensorium:  oriented to person, place, and time/date   Memory:  recent and remote memory grossly intact   Consciousness:  alert and awake   Attention/Concentration: attention span and concentration are age appropriate   Insight:  limited   Judgment: limited   Gait/Station: normal gait/station   Motor Activity: no abnormal movements     Vital signs in last 24 hours:    Temp:  [97.9 " °F (36.6 °C)-98.6 °F (37 °C)] 97.9 °F (36.6 °C)  HR:  [63-70] 63  BP: (118-142)/(62-71) 118/62  Resp:  [16] 16  SpO2:  [95 %-98 %] 95 %  O2 Device: None (Room air)         Laboratory results: I have personally reviewed all pertinent laboratory/tests results    Most Recent Labs:   Lab Results   Component Value Date    WBC 7.01 06/27/2024    RBC 4.54 06/27/2024    HGB 14.5 06/27/2024    HCT 44.4 06/27/2024     06/27/2024    RDW 12.0 06/27/2024    NEUTROABS 4.01 06/27/2024    SODIUM 139 07/10/2024    K 3.8 07/10/2024     07/10/2024    CO2 27 07/10/2024    BUN 16 07/10/2024    CREATININE 0.92 07/10/2024    GLUC 87 07/10/2024    CALCIUM 9.3 07/10/2024    AST 28 07/10/2024    ALT 36 07/10/2024    ALKPHOS 66 07/10/2024    TP 7.7 07/10/2024    ALB 4.4 07/10/2024    TBILI 0.55 07/10/2024    CHOLESTEROL 168 09/19/2024    HDL 42 09/19/2024    TRIG 137 09/19/2024    LDLCALC 99 09/19/2024    NONHDLC 126 09/19/2024    [id number] 6.241 (H) 09/10/2024    FREET4 0.56 (L) 09/10/2024    SYPHILISAB Non-reactive 05/15/2024       Progress Toward Goals: Franco is currently assessed as being at their baseline with continued need for medication management, supervision for safety and ADL’s. These services are not currently available in a less restrictive environment necessitating continued hospital stay.  Franco should remain on the unit until these services are available, due to likelihood of mental decompensation and readmission if discharged to an unsupervised setting.  Assertive discharge planning and collaboration with multiple providers (inpatient and community based) remains ongoing.  Franco is currently awaiting for group home placement.       Counseling / Coordination of Care:    Administrative Statements   I have spent a total time of 25 minutes in caring for this patient on the day of the visit/encounter including Counseling / Coordination of care, Documenting in the medical record, Obtaining or reviewing history  ,  and Communicating with other healthcare professionals .    Lita Knapp,  10/07/24    This note was completed in part utilizing Dragon dictation Software. Grammatical, translation, syntax errors, random word insertions, spelling mistakes, and incomplete sentences may be an occasional consequence of this system secondary to software limitations with voice recognition, ambient noise, and hardware issues. If you have any questions or concerns about the content, text, or information contained within the body of this dictation, please contact the provider for clarification.

## 2024-10-07 NOTE — NURSING NOTE
Patient has been withdrawn to his room. Vegetative despite denying depression or disturbance of mood.  He denies S.I.H.I.A/H V/H

## 2024-10-07 NOTE — PLAN OF CARE
Problem: Ineffective Coping  Goal: Identifies ineffective coping skills  Outcome: Progressing  Goal: Identifies healthy coping skills  Outcome: Progressing  Goal: Demonstrates healthy coping skills  Outcome: Progressing  Goal: Participates in unit activities  Description: Interventions:  - Provide therapeutic environment   - Provide required programming   - Redirect inappropriate behaviors   Outcome: Not Progressing     Problem: Risk for Self Injury/Neglect  Goal: Treatment Goal: Remain safe during length of stay, learn and adopt new coping skills, and be free of self-injurious ideation, impulses and acts at the time of discharge  Outcome: Progressing     Problem: Depression  Goal: Treatment Goal: Demonstrate behavioral control of depressive symptoms, verbalize feelings of improved mood/affect, and adopt new coping skills prior to discharge  Outcome: Progressing  Goal: Verbalize thoughts and feelings  Description: Interventions:  - Assess and re-assess patient's level of risk   - Engage patient in 1:1 interactions, daily, for a minimum of 15 minutes   - Encourage patient to express feelings, fears, frustrations, hopes   Outcome: Progressing  Goal: Refrain from harming self  Description: Interventions:  - Monitor patient closely, per order   - Supervise medication ingestion, monitor effects and side effects   Outcome: Progressing  Goal: Refrain from isolation  Description: Interventions:  - Develop a trusting relationship   - Encourage socialization   Outcome: Progressing  Goal: Refrain from self-neglect  Outcome: Progressing  Goal: Attend and participate in unit activities, including therapeutic, recreational, and educational groups  Description: Interventions:  - Provide therapeutic and educational activities daily, encourage attendance and participation, and document same in the medical record   Outcome: Not Progressing  Goal: Complete daily ADLs, including personal hygiene independently, as able  Description:  Interventions:  - Observe, teach, and assist patient with ADLS  -  Monitor and promote a balance of rest/activity, with adequate nutrition and elimination   Outcome: Progressing     Problem: Anxiety  Goal: Anxiety is at manageable level  Description: Interventions:  - Assess and monitor patient's anxiety level.   - Monitor for signs and symptoms (heart palpitations, chest pain, shortness of breath, headaches, nausea, feeling jumpy, restlessness, irritable, apprehensive).   - Collaborate with interdisciplinary team and initiate plan and interventions as ordered.  - [place] patient to unit/surroundings  - Explain treatment plan  - Encourage participation in care  - Encourage verbalization of concerns/fears  - Identify coping mechanisms  - Assist in developing anxiety-reducing skills  - Administer/offer alternative therapies  - Limit or eliminate stimulants  Outcome: Progressing     Problem: DISCHARGE PLANNING - CARE MANAGEMENT  Goal: Discharge to post-acute care or home with appropriate resources  Description: INTERVENTIONS:  - Conduct assessment to determine patient/family and health care team treatment goals, and need for post-acute services based on payer coverage, community resources, and patient preferences, and barriers to discharge  - Address psychosocial, clinical, and financial barriers to discharge as identified in assessment in conjunction with the patient/family and health care team  - Arrange appropriate level of post-acute services according to patient’s   needs and preference and payer coverage in collaboration with the physician and health care team  - Communicate with and update the patient/family, physician, and health care team regarding progress on the discharge plan  - Arrange appropriate transportation to post-acute venues  Outcome: Progressing     Problem: Knowledge Deficit  Goal: Patient/family/caregiver demonstrates understanding of disease process, treatment plan, medications, and discharge  instructions  Description: Complete learning assessment and assess knowledge base.  Interventions:  - Provide teaching at level of understanding  - Provide teaching via preferred learning methods  Outcome: Progressing     Problem: SLEEP DISTURBANCE  Goal: Will exhibit normal sleeping pattern  Description: Interventions:  -  Assess the patients sleep pattern, noting recent changes  - Administer medication as ordered  - Decrease environmental stimuli, including noise, as appropriate during the night  - Encourage the patient to actively participate in unit groups and or exercise during the day to enhance ability to achieve adequate sleep at night  - Assess the patient, in the morning, encouraging a description of sleep experience  Outcome: Progressing

## 2024-10-07 NOTE — PROGRESS NOTES
10/07/24 0835   Team Meeting   Meeting Type Daily Rounds   Team Members Present   Team Members Present Physician;;Nurse   Physician Team Member Aria   Nursing Team Member [facility] Team Member Noa   Patient/Family Present   Patient Present No   Patient's Family Present No     Pt reported he feels hopeful. Pt verbalized that he is working on channeling negative thoughts. Pt's neuro cognitive evaluation was completed. Pt's discharge is pending placement.

## 2024-10-07 NOTE — NURSING NOTE
Pt denies SI/HI/AH/VH. Pt appears depressed at times  but denies feeling depressed. Pt isolative to room. Medication and meal compliant. Scant with communication. Compliant with Lunch. No further concerns as of present. Plan of care ongoing.

## 2024-10-08 PROCEDURE — 99232 SBSQ HOSP IP/OBS MODERATE 35: CPT | Performed by: PSYCHIATRY & NEUROLOGY

## 2024-10-08 RX ADMIN — CHOLECALCIFEROL TAB 25 MCG (1000 UNIT) 2000 UNITS: 25 TAB at 08:02

## 2024-10-08 RX ADMIN — CYANOCOBALAMIN TAB 1000 MCG 1000 MCG: 1000 TAB at 08:02

## 2024-10-08 RX ADMIN — FAMOTIDINE 20 MG: 20 TABLET ORAL at 08:02

## 2024-10-08 RX ADMIN — FAMOTIDINE 20 MG: 20 TABLET ORAL at 17:12

## 2024-10-08 RX ADMIN — ARIPIPRAZOLE 5 MG: 5 TABLET ORAL at 08:02

## 2024-10-08 RX ADMIN — ATORVASTATIN CALCIUM 10 MG: 10 TABLET, FILM COATED ORAL at 17:12

## 2024-10-08 RX ADMIN — LEVOTHYROXINE SODIUM 37.5 MCG: 125 TABLET ORAL at 06:19

## 2024-10-08 RX ADMIN — SERTRALINE HYDROCHLORIDE 150 MG: 100 TABLET ORAL at 08:02

## 2024-10-08 NOTE — PROGRESS NOTES
"  Progress Note - Behavioral Health   Name: Franco Roberson 39 y.o. male I MRN: [id number]  Unit/Bed#: -02 I Date of Admission: 5/14/2024   Date of Service: 10/8/2024 I Hospital Day: 147     Assessment & Plan  MDD (major depressive disorder), recurrent severe, without psychosis (HCC)  No medications changes at this time, will continue to monitor and optimize as indicated:  Abilify 5 mg daily as mood adjunct   Zoloft 150 mg daily for depression and anxiety    Continue to encourage participation in group therapy, milieu therapy and occupational therapy.  Continue to assess for side effects of medications.  Continue collaboration with SLIM for medical co-morbidities as indicated.  Continue discussion with CM/SW to assist with obtaining collateral, disposition planning, and the implementation of patient-centered individualized plan of care.  Continue frequent safety checks and vitals per unit protocol.    Risks, benefits and possible side effects of Medications: Risks, benefits, and possible side effects of medications have previously been explained. No new medications at this time.      Legal status: 201    Disposition: to be determined, pending SOAR application for potential group home placement. OT Cognitive Evaluation completed: \"Pt would benefit from discharge to a supportive environment that can provide checks for safety and compliance with IADLs. \"    Autism spectrum disorder  Continue supportive care    Current medications:  Current Facility-Administered Medications   Medication Dose Route Frequency Provider Last Rate    acetaminophen  650 mg Oral Q6H PRN Basilio Brown MD      acetaminophen  650 mg Oral Q4H PRN Basilio Brown MD      acetaminophen  975 mg Oral Q6H PRN Basilio Brown MD      aluminum-magnesium hydroxide-simethicone  30 mL Oral Q4H PRN Basilio Brown MD      ARIPiprazole  5 mg Oral Daily Basilio Panda MD      Artificial Tears  1 drop Both Eyes Q3H PRN Basilio Garcia" MD Karyn      atorvastatin  10 mg Oral Daily With Dinner Lauraainsley Romegrady Ashford, CRDARIEN      benztropine  1 mg Intramuscular Q4H PRN Max 6/day Basilio Brown MD      benztropine  1 mg Oral Q4H PRN Max 6/day Basilio Brown MD      Cholecalciferol  2,000 Units Oral Daily Laura Diasomid, CRNP      cyanocobalamin  1,000 mcg Oral Daily Laura Clark Makeda CRNP      Diclofenac Sodium  2 g Topical 4x Daily PRN Laura Ambergrady Ashford, CRNP      hydrOXYzine HCL  50 mg Oral Q6H PRN Max 4/day Basilio Brown MD      Or    diphenhydrAMINE  50 mg Intramuscular Q6H PRN Basilio Brown MD      diphenhydrAMINE-zinc acetate   Topical Daily PRN Raj Guerrero MD      famotidine  20 mg Oral BID Laura Clark Makeda, CRNP      hydrOXYzine HCL  100 mg Oral Q6H PRN Max 4/day Basilio Brown MD      Or    LORazepam  2 mg Intramuscular Q6H PRN Basilio Brown MD      hydrOXYzine HCL  25 mg Oral Q6H PRN Max 4/day Basilio Brown MD      levothyroxine  37.5 mcg Oral Early Morning Laura Clark Makeda, ARJUNNP      melatonin  6 mg Oral HS PRN WALLY Gomez      methocarbamol  500 mg Oral Q6H PRN Lauraainsley Ashford CRNP      OLANZapine  5 mg Oral Q4H PRN Max 3/day Basilio Brown MD      Or    OLANZapine  2.5 mg Intramuscular Q4H PRN Max 3/day Basilio Brown MD      OLANZapine  5 mg Oral Q3H PRN Max 3/day Basilio Brown MD      Or    OLANZapine  5 mg Intramuscular Q3H PRN Max 3/day Basilio Brown MD      OLANZapine  2.5 mg Oral Q4H PRN Max 6/day Basilio Brown MD      polyethylene glycol  17 g Oral Daily PRN Basilio Brown MD      propranolol  10 mg Oral Q8H PRN Basilio Brown MD      senna-docusate sodium  1 tablet Oral Daily PRN Basilio Brown MD      sertraline  150 mg Oral Daily WALLY Gomez      traZODone  50 mg Oral HS PRN Basilio Brown MD         Risks / Benefits of Treatment:    Risks, benefits, and possible side  "effects of medications explained to patient and patient verbalizes understanding and agreement for treatment.    Subjective:    Behavior over the last 24 hours: unchanged.     The patient was evaluated this morning for continuity of care and no acute distress noted throughout the evaluation.  Over the past 24 hours staff noted the patient was compliant with medication and meals, seclusive to his room but is visible on the unit during meals.    Today on exam the patient was walking in the hallway and reports that he is doing well today.  He does ask if Social Security application has been submitted.  Currently he is denying all depressive and anxiety symptoms.  He is denying any side effects from medication and understands that his discharge is pending placement acceptance.    Sleep: normal  Appetite: Normal   Medication side effects: No   ROS: no complaints    Mental Status Evaluation:    Appearance:  age appropriate, casually dressed, marginal hygiene, looks stated age   Behavior:  pleasant, cooperative, calm   Speech:  normal rate and volume, scant   Mood:  \"Good\"   Affect:  constricted   Thought Process:  coherent, goal directed, linear   Associations: concrete associations   Thought Content:  no overt delusions   Perceptual Disturbances: no auditory hallucinations, no visual hallucinations, does not appear responding to internal stimuli   Risk Potential: Suicidal ideation - None at present  Homicidal ideation - None at present  Potential for aggression - No   Sensorium:  oriented to person, place, and time/date   Memory:  recent and remote memory grossly intact   Consciousness:  alert and awake   Attention/Concentration: attention span and concentration are age appropriate   Insight:  limited   Judgment: limited   Gait/Station: normal gait/station   Motor Activity: no abnormal movements     Vital signs in last 24 hours:    Temp:  [96.4 °F (35.8 °C)-96.6 °F (35.9 °C)] 96.6 °F (35.9 °C)  HR:  [62-84] 84  BP: " (115-123)/(69-72) 123/72  Resp:  [16] 16  SpO2:  [96 %-97 %] 96 %  O2 Device: None (Room air)         Laboratory results: I have personally reviewed all pertinent laboratory/tests results    Most Recent Labs:   Lab Results   Component Value Date    WBC 7.01 06/27/2024    RBC 4.54 06/27/2024    HGB 14.5 06/27/2024    HCT 44.4 06/27/2024     06/27/2024    RDW 12.0 06/27/2024    NEUTROABS 4.01 06/27/2024    SODIUM 139 07/10/2024    K 3.8 07/10/2024     07/10/2024    CO2 27 07/10/2024    BUN 16 07/10/2024    CREATININE 0.92 07/10/2024    GLUC 87 07/10/2024    CALCIUM 9.3 07/10/2024    AST 28 07/10/2024    ALT 36 07/10/2024    ALKPHOS 66 07/10/2024    TP 7.7 07/10/2024    ALB 4.4 07/10/2024    TBILI 0.55 07/10/2024    CHOLESTEROL 168 09/19/2024    HDL 42 09/19/2024    TRIG 137 09/19/2024    LDLCALC 99 09/19/2024    NONHDLC 126 09/19/2024    EIF8EMIXHAGF 6.241 (H) 09/10/2024    FREET4 0.56 (L) 09/10/2024    SYPHILISAB Non-reactive 05/15/2024       Progress Toward Goals: Franco is currently assessed as being at their baseline with continued need for medication management, supervision for safety and IADL’s. These services are not currently available in a less restrictive environment necessitating continued hospital stay.  Franco should remain on the unit until these services are available, due to likelihood of mental decompensation and readmission if discharged to an unsupervised setting.  Assertive discharge planning and collaboration with multiple providers (inpatient and community based) remains ongoing.  Franco is currently awaiting for group home placement.      Counseling / Coordination of Care:        Lita Knapp DO 10/08/24    This note was completed in part utilizing Dragon dictation Software. Grammatical, translation, syntax errors, random word insertions, spelling mistakes, and incomplete sentences may be an occasional consequence of this system secondary to software limitations with voice  recognition, ambient noise, and hardware issues. If you have any questions or concerns about the content, text, or information contained within the body of this dictation, please contact the provider for clarification.

## 2024-10-08 NOTE — NURSING NOTE
Pt mainly isolative to room and withdrawn to self. Out of room intermittently for needs. Denies SI/HI/AVH. Denies depression, anxiety, pain, needs. Compliant with unit routines and care. Safety checks ongoing.

## 2024-10-08 NOTE — NURSING NOTE
Pt denies SI/HI/AH/VH. Present in dayroom and milieu but is isolative to self. Medication and meal compliant. Pt appears depressed  but denies depression at this time. Compliant with Lunch. Pt is observed interacting with staff more than peers. No further concerns as of present. Plan of care ongoing.

## 2024-10-08 NOTE — PLAN OF CARE
Problem: Ineffective Coping  Goal: Identifies ineffective coping skills  Outcome: Progressing  Goal: Identifies healthy coping skills  Outcome: Progressing     Problem: Risk for Self Injury/Neglect  Goal: Treatment Goal: Remain safe during length of stay, learn and adopt new coping skills, and be free of self-injurious ideation, impulses and acts at the time of discharge  Outcome: Progressing     Problem: Depression  Goal: Treatment Goal: Demonstrate behavioral control of depressive symptoms, verbalize feelings of improved mood/affect, and adopt new coping skills prior to discharge  Outcome: Progressing  Goal: Verbalize thoughts and feelings  Description: Interventions:  - Assess and re-assess patient's level of risk   - Engage patient in 1:1 interactions, daily, for a minimum of 15 minutes   - Encourage patient to express feelings, fears, frustrations, hopes   Outcome: Progressing  Goal: Refrain from harming self  Description: Interventions:  - Monitor patient closely, per order   - Supervise medication ingestion, monitor effects and side effects   Outcome: Progressing  Goal: Refrain from isolation  Description: Interventions:  - Develop a trusting relationship   - Encourage socialization   Outcome: Progressing  Goal: Refrain from self-neglect  Outcome: Progressing  Goal: Complete daily ADLs, including personal hygiene independently, as able  Description: Interventions:  - Observe, teach, and assist patient with ADLS  -  Monitor and promote a balance of rest/activity, with adequate nutrition and elimination   Outcome: Progressing     Problem: Anxiety  Goal: Anxiety is at manageable level  Description: Interventions:  - Assess and monitor patient's anxiety level.   - Monitor for signs and symptoms (heart palpitations, chest pain, shortness of breath, headaches, nausea, feeling jumpy, restlessness, irritable, apprehensive).   - Collaborate with interdisciplinary team and initiate plan and interventions as  ordered.  - [place] patient to unit/surroundings  - Explain treatment plan  - Encourage participation in care  - Encourage verbalization of concerns/fears  - Identify coping mechanisms  - Assist in developing anxiety-reducing skills  - Administer/offer alternative therapies  - Limit or eliminate stimulants  Outcome: Progressing       Problem: Knowledge Deficit  Goal: Patient/family/caregiver demonstrates understanding of disease process, treatment plan, medications, and discharge instructions  Description: Complete learning assessment and assess knowledge base.  Interventions:  - Provide teaching at level of understanding  - Provide teaching via preferred learning methods  Outcome: Progressing     Problem: SLEEP DISTURBANCE  Goal: Will exhibit normal sleeping pattern  Description: Interventions:  -  Assess the patients sleep pattern, noting recent changes  - Administer medication as ordered  - Decrease environmental stimuli, including noise, as appropriate during the night  - Encourage the patient to actively participate in unit groups and or exercise during the day to enhance ability to achieve adequate sleep at night  - Assess the patient, in the morning, encouraging a description of sleep experience  Outcome: Progressing

## 2024-10-08 NOTE — PROGRESS NOTES
10/08/24 0837   Team Meeting   Meeting Type Daily Rounds   Team Members Present   Team Members Present Physician;;Nurse   Physician Team Member Aria   [facility]   Care Management Team Member Noa   Patient/Family Present   Patient Present No   Patient's Family Present No     Pt medication and meal compliant. Pt denies SI/HI/AVH. Pt is seclusive to his room and social with select peers and staff . Pt is waiting placement.

## 2024-10-09 PROCEDURE — 99232 SBSQ HOSP IP/OBS MODERATE 35: CPT | Performed by: PSYCHIATRY & NEUROLOGY

## 2024-10-09 RX ADMIN — ATORVASTATIN CALCIUM 10 MG: 10 TABLET, FILM COATED ORAL at 16:47

## 2024-10-09 RX ADMIN — LEVOTHYROXINE SODIUM 37.5 MCG: 125 TABLET ORAL at 06:35

## 2024-10-09 RX ADMIN — CHOLECALCIFEROL TAB 25 MCG (1000 UNIT) 2000 UNITS: 25 TAB at 08:28

## 2024-10-09 RX ADMIN — FAMOTIDINE 20 MG: 20 TABLET ORAL at 18:10

## 2024-10-09 RX ADMIN — ARIPIPRAZOLE 5 MG: 5 TABLET ORAL at 08:28

## 2024-10-09 RX ADMIN — FAMOTIDINE 20 MG: 20 TABLET ORAL at 08:28

## 2024-10-09 RX ADMIN — CYANOCOBALAMIN TAB 1000 MCG 1000 MCG: 1000 TAB at 08:28

## 2024-10-09 RX ADMIN — SERTRALINE HYDROCHLORIDE 150 MG: 100 TABLET ORAL at 08:28

## 2024-10-09 NOTE — NURSING NOTE
"Pt visible on unit in milieu, but mainly stayed in his room. Pt reports that he does not feel depressed, but he is staying in his room because the lights on the unit bothers his eyes. He states \"Things are starting to move along\" and denies SI/HI/AH/VH. Pt attended group today. No PRN's requested. No unmet needs.   "

## 2024-10-09 NOTE — NURSING NOTE
Pt visible on unit, engaged in writing in journal. Pleasant and denies symptoms. Pt compliant with unit routines and care. Denies unmet needs. Safety checks ongoing.

## 2024-10-09 NOTE — PROGRESS NOTES
10/09/24 0844   Team Meeting   Meeting Type Daily Rounds   Team Members Present   Team Members Present Physician;;Nurse   Physician Team Member Aria   Nursing Team Member [facility] Management Team Member Noa   Patient/Family Present   Patient Present No   Patient's Family Present No     Pt is medication and meal compliant. Pt is social with selective peers. Pt denies SI/HI/AVH. Pt's discharge is pending placement.

## 2024-10-09 NOTE — CASE MANAGEMENT
Pt met with writer and worked on SOAR Social Security application which is long, and involves many details and questions. Pt was cooperative, appreciative, responsive, and engaged throughout the process. Will continue tomorrow.     LIT's signed for: PCP: Dr Lim, [facility]- may have gone there in 1999 for a few sessions, [facility], [employer], [facility], [facility]    Records requested from: Dr Lim (PCP), [facility], [facility].

## 2024-10-09 NOTE — PROGRESS NOTES
"  Progress Note - Behavioral Health   Name: Franco Roberson 39 y.o. male I MRN: [id number]  Unit/Bed#: -02 I Date of Admission: 5/14/2024   Date of Service: 10/9/2024 I Hospital Day: 148     Assessment & Plan  MDD (major depressive disorder), recurrent severe, without psychosis (HCC)  No medications changes at this time, will continue to monitor and optimize as indicated:  Abilify 5 mg daily as mood adjunct   Zoloft 150 mg daily for depression and anxiety    Continue to encourage participation in group therapy, milieu therapy and occupational therapy.  Continue to assess for side effects of medications.  Continue collaboration with SLIM for medical co-morbidities as indicated.  Continue discussion with CM/SW to assist with obtaining collateral, disposition planning, and the implementation of patient-centered individualized plan of care.  Continue frequent safety checks and vitals per unit protocol.    Risks, benefits and possible side effects of Medications: Risks, benefits, and possible side effects of medications have previously been explained. No new medications at this time.      Legal status: 201    Disposition: to be determined, pending SOAR application for potential group home placement. OT Cognitive Evaluation completed: \"Pt would benefit from discharge to a supportive environment that can provide checks for safety and compliance with IADLs. \"    Autism spectrum disorder  Continue supportive care    Current medications:  Current Facility-Administered Medications   Medication Dose Route Frequency Provider Last Rate    acetaminophen  650 mg Oral Q6H PRN Basilio Brown MD      acetaminophen  650 mg Oral Q4H PRN Basilio Brown MD      acetaminophen  975 mg Oral Q6H PRN Basilio Brown MD      aluminum-magnesium hydroxide-simethicone  30 mL Oral Q4H PRN Basilio Brown MD      ARIPiprazole  5 mg Oral Daily Basilio Panda MD      Artificial Tears  1 drop Both Eyes Q3H PRN Basilio Garcia" MD Karyn      atorvastatin  10 mg Oral Daily With Dinner Lauraainsley Romegrady Ashford, CRDARIEN      benztropine  1 mg Intramuscular Q4H PRN Max 6/day Basilio Brown MD      benztropine  1 mg Oral Q4H PRN Max 6/day Basilio Brown MD      Cholecalciferol  2,000 Units Oral Daily Laura Diasomid, CRNP      cyanocobalamin  1,000 mcg Oral Daily Laura Clark Makeda CRNP      Diclofenac Sodium  2 g Topical 4x Daily PRN Laura Ambergrady Ashford, CRNP      hydrOXYzine HCL  50 mg Oral Q6H PRN Max 4/day Basilio Brown MD      Or    diphenhydrAMINE  50 mg Intramuscular Q6H PRN Basilio Brown MD      diphenhydrAMINE-zinc acetate   Topical Daily PRN Raj Guerrero MD      famotidine  20 mg Oral BID Laura Clark Makeda, CRNP      hydrOXYzine HCL  100 mg Oral Q6H PRN Max 4/day Basilio Brown MD      Or    LORazepam  2 mg Intramuscular Q6H PRN Basilio Brown MD      hydrOXYzine HCL  25 mg Oral Q6H PRN Max 4/day Basilio Brown MD      levothyroxine  37.5 mcg Oral Early Morning Laura Clark Makeda, ARJUNNP      melatonin  6 mg Oral HS PRN WALLY Gomez      methocarbamol  500 mg Oral Q6H PRN Lauraainsley Ashford CRNP      OLANZapine  5 mg Oral Q4H PRN Max 3/day Basilio Brown MD      Or    OLANZapine  2.5 mg Intramuscular Q4H PRN Max 3/day Basilio Brown MD      OLANZapine  5 mg Oral Q3H PRN Max 3/day Basilio Brown MD      Or    OLANZapine  5 mg Intramuscular Q3H PRN Max 3/day Basilio Brown MD      OLANZapine  2.5 mg Oral Q4H PRN Max 6/day Basilio Brown MD      polyethylene glycol  17 g Oral Daily PRN Basilio Brown MD      propranolol  10 mg Oral Q8H PRN Basilio Brown MD      senna-docusate sodium  1 tablet Oral Daily PRN Basilio Brown MD      sertraline  150 mg Oral Daily WALLY Gomez      traZODone  50 mg Oral HS PRN Basilio Brown MD         Risks / Benefits of Treatment:    Risks, benefits, and possible side  "effects of medications explained to patient and patient verbalizes understanding and agreement for treatment.    Subjective:    Behavior over the last 24 hours: unchanged.     The patient was evaluated this morning for continuity of care and no acute distress noted throughout the evaluation.  Over the past 24 hours staff noted the patient was present in the day room at times, remains isolative to his self.  In the afternoon and was interacting with staff, patient appears depressed at times but he denies depression.      Patient was visited on unit for continuing care; chart reviewed and discussed with multidisciplinary treatment team.  On approach, the patient was calm, pleasant and cooperative. Denied any changes in mood, appetite, and energy level. No problem initiating and maintaining sleep.  Denied A/VH currently.  Denied SI/HI, intent or plan upon direct inquiry at this time.    Patient continues to be selectively interactive with staff and peers. No reports of aggression or self-injurious behavior on unit. No PRN medications used in the past 24 hours.    Patient accepted all offered medications and no adverse effects of medications noted or reported.    Sleep: normal  Appetite: normal  Medication side effects: No   ROS: no complaints    Mental Status Evaluation:  Mental Status Examination:  Appearance:  age appropriate, casually dressed, marginal hygiene, looks stated age   Behavior:  pleasant, cooperative, calm   Speech:  normal rate and volume, scant   Mood:  \"Pretty good\"   Affect:  constricted   Thought Process:  organized, logical, coherent   Associations: concrete associations   Thought Content:  no overt delusions   Perceptual Disturbances: no auditory hallucinations, no visual hallucinations, does not appear responding to internal stimuli   Risk Potential: Suicidal ideation - None at present  Homicidal ideation - None at present  Potential for aggression - No   Sensorium:  oriented to person, place, " and time/date   Memory:  recent and remote memory grossly intact   Consciousness:  alert and awake   Attention/Concentration: attention span and concentration are age appropriate   Insight:  limited   Judgment: limited   Gait/Station: normal gait/station   Motor Activity: no abnormal movements       Vital signs in last 24 hours:    Temp:  [96.6 °F (35.9 °C)-97.3 °F (36.3 °C)] 97.3 °F (36.3 °C)  HR:  [79-84] 79  BP: (110-123)/(60-72) 110/60  Resp:  [16] 16  SpO2:  [96 %-97 %] 97 %  O2 Device: None (Room air)         Laboratory results: I have personally reviewed all pertinent laboratory/tests results    Most Recent Labs:   Lab Results   Component Value Date    WBC 7.01 06/27/2024    RBC 4.54 06/27/2024    HGB 14.5 06/27/2024    HCT 44.4 06/27/2024     06/27/2024    RDW 12.0 06/27/2024    NEUTROABS 4.01 06/27/2024    SODIUM 139 07/10/2024    K 3.8 07/10/2024     07/10/2024    CO2 27 07/10/2024    BUN 16 07/10/2024    CREATININE 0.92 07/10/2024    GLUC 87 07/10/2024    CALCIUM 9.3 07/10/2024    AST 28 07/10/2024    ALT 36 07/10/2024    ALKPHOS 66 07/10/2024    TP 7.7 07/10/2024    ALB 4.4 07/10/2024    TBILI 0.55 07/10/2024    CHOLESTEROL 168 09/19/2024    HDL 42 09/19/2024    TRIG 137 09/19/2024    LDLCALC 99 09/19/2024    NONHDLC 126 09/19/2024    ESA5HVFIOCMT 6.241 (H) 09/10/2024    FREET4 0.56 (L) 09/10/2024    SYPHILISAB Non-reactive 05/15/2024       Progress Toward Goals: Franco is currently assessed as being at their baseline with continued need for medication management, supervision for safety and ADL’s. These services are not currently available in a less restrictive environment necessitating continued hospital stay.  Franco should remain on the unit until these services are available, due to likelihood of mental decompensation and readmission if discharged to an unsupervised setting.  Assertive discharge planning and collaboration with multiple providers (inpatient and community based) remains  ongoing.  Franco is currently awaiting for group home.      Counseling / Coordination of Care:    Administrative Statements   I have spent a total time of 20 minutes in caring for this patient on the day of the visit/encounter including Counseling / Coordination of care, Documenting in the medical record, and Obtaining or reviewing history  .    Lita Knapp,  10/09/24    This note was completed in part utilizing Dragon dictation Software. Grammatical, translation, syntax errors, random word insertions, spelling mistakes, and incomplete sentences may be an occasional consequence of this system secondary to software limitations with voice recognition, ambient noise, and hardware issues. If you have any questions or concerns about the content, text, or information contained within the body of this dictation, please contact the provider for clarification.

## 2024-10-09 NOTE — NURSING NOTE
Patient is more visible on unit, walking the hallway and watching tv in patient lounge. Patient has limited interaction with peers and staff but is able to approach staff to make needs known. Patient denies SI/HI/AVH. Patient denies feelings of depression and anxiety. No complaints or unmet needs identified at this time. Q 7 minute safety checks maintained.

## 2024-10-09 NOTE — PLAN OF CARE
Problem: Ineffective Coping  Goal: Identifies ineffective coping skills  Outcome: Progressing  Goal: Identifies healthy coping skills  Outcome: Progressing  Goal: Demonstrates healthy coping skills  Outcome: Progressing  Goal: Participates in unit activities  Description: Interventions:  - Provide therapeutic environment   - Provide required programming   - Redirect inappropriate behaviors   Outcome: Progressing     Problem: Risk for Self Injury/Neglect  Goal: Treatment Goal: Remain safe during length of stay, learn and adopt new coping skills, and be free of self-injurious ideation, impulses and acts at the time of discharge  Outcome: Progressing     Problem: Depression  Goal: Treatment Goal: Demonstrate behavioral control of depressive symptoms, verbalize feelings of improved mood/affect, and adopt new coping skills prior to discharge  Outcome: Progressing  Goal: Verbalize thoughts and feelings  Description: Interventions:  - Assess and re-assess patient's level of risk   - Engage patient in 1:1 interactions, daily, for a minimum of 15 minutes   - Encourage patient to express feelings, fears, frustrations, hopes   Outcome: Progressing  Goal: Refrain from harming self  Description: Interventions:  - Monitor patient closely, per order   - Supervise medication ingestion, monitor effects and side effects   Outcome: Progressing  Goal: Refrain from isolation  Description: Interventions:  - Develop a trusting relationship   - Encourage socialization   Outcome: Progressing  Goal: Refrain from self-neglect  Outcome: Progressing  Goal: Attend and participate in unit activities, including therapeutic, recreational, and educational groups  Description: Interventions:  - Provide therapeutic and educational activities daily, encourage attendance and participation, and document same in the medical record   Outcome: Progressing  Goal: Complete daily ADLs, including personal hygiene independently, as able  Description:  Interventions:  - Observe, teach, and assist patient with ADLS  -  Monitor and promote a balance of rest/activity, with adequate nutrition and elimination   Outcome: Progressing     Problem: Anxiety  Goal: Anxiety is at manageable level  Description: Interventions:  - Assess and monitor patient's anxiety level.   - Monitor for signs and symptoms (heart palpitations, chest pain, shortness of breath, headaches, nausea, feeling jumpy, restlessness, irritable, apprehensive).   - Collaborate with interdisciplinary team and initiate plan and interventions as ordered.  - [place] patient to unit/surroundings  - Explain treatment plan  - Encourage participation in care  - Encourage verbalization of concerns/fears  - Identify coping mechanisms  - Assist in developing anxiety-reducing skills  - Administer/offer alternative therapies  - Limit or eliminate stimulants  Outcome: Progressing

## 2024-10-10 PROCEDURE — 99232 SBSQ HOSP IP/OBS MODERATE 35: CPT | Performed by: PSYCHIATRY & NEUROLOGY

## 2024-10-10 RX ADMIN — FAMOTIDINE 20 MG: 20 TABLET ORAL at 17:38

## 2024-10-10 RX ADMIN — SERTRALINE HYDROCHLORIDE 150 MG: 100 TABLET ORAL at 08:10

## 2024-10-10 RX ADMIN — CHOLECALCIFEROL TAB 25 MCG (1000 UNIT) 2000 UNITS: 25 TAB at 08:10

## 2024-10-10 RX ADMIN — FAMOTIDINE 20 MG: 20 TABLET ORAL at 08:10

## 2024-10-10 RX ADMIN — CYANOCOBALAMIN TAB 1000 MCG 1000 MCG: 1000 TAB at 08:10

## 2024-10-10 RX ADMIN — ARIPIPRAZOLE 5 MG: 5 TABLET ORAL at 08:10

## 2024-10-10 RX ADMIN — ATORVASTATIN CALCIUM 10 MG: 10 TABLET, FILM COATED ORAL at 17:38

## 2024-10-10 RX ADMIN — LEVOTHYROXINE SODIUM 37.5 MCG: 125 TABLET ORAL at 06:04

## 2024-10-10 NOTE — NURSING NOTE
Pt pleasant and cooperative upon approach. Denies SI/HI/AH/VH, depression, and anxiety. Pt seen on the unit walking the halls, seclusive to self. Encouraged to attend more groups. Medication and meal compliant. No unmet needs.

## 2024-10-10 NOTE — PLAN OF CARE
Problem: Ineffective Coping  Goal: Identifies ineffective coping skills  Outcome: Progressing  Goal: Identifies healthy coping skills  Outcome: Progressing  Goal: Demonstrates healthy coping skills  Outcome: Progressing  Goal: Participates in unit activities  Description: Interventions:  - Provide therapeutic environment   - Provide required programming   - Redirect inappropriate behaviors   Outcome: Progressing     Problem: Risk for Self Injury/Neglect  Goal: Treatment Goal: Remain safe during length of stay, learn and adopt new coping skills, and be free of self-injurious ideation, impulses and acts at the time of discharge  Outcome: Progressing     Problem: Depression  Goal: Treatment Goal: Demonstrate behavioral control of depressive symptoms, verbalize feelings of improved mood/affect, and adopt new coping skills prior to discharge  Outcome: Progressing  Goal: Verbalize thoughts and feelings  Description: Interventions:  - Assess and re-assess patient's level of risk   - Engage patient in 1:1 interactions, daily, for a minimum of 15 minutes   - Encourage patient to express feelings, fears, frustrations, hopes   Outcome: Progressing  Goal: Refrain from harming self  Description: Interventions:  - Monitor patient closely, per order   - Supervise medication ingestion, monitor effects and side effects   Outcome: Progressing  Goal: Refrain from isolation  Description: Interventions:  - Develop a trusting relationship   - Encourage socialization   Outcome: Progressing  Goal: Refrain from self-neglect  Outcome: Progressing  Goal: Attend and participate in unit activities, including therapeutic, recreational, and educational groups  Description: Interventions:  - Provide therapeutic and educational activities daily, encourage attendance and participation, and document same in the medical record   Outcome: Progressing  Goal: Complete daily ADLs, including personal hygiene independently, as able  Description:  Interventions:  - Observe, teach, and assist patient with ADLS  -  Monitor and promote a balance of rest/activity, with adequate nutrition and elimination   Outcome: Progressing     Problem: Anxiety  Goal: Anxiety is at manageable level  Description: Interventions:  - Assess and monitor patient's anxiety level.   - Monitor for signs and symptoms (heart palpitations, chest pain, shortness of breath, headaches, nausea, feeling jumpy, restlessness, irritable, apprehensive).   - Collaborate with interdisciplinary team and initiate plan and interventions as ordered.  - [place] patient to unit/surroundings  - Explain treatment plan  - Encourage participation in care  - Encourage verbalization of concerns/fears  - Identify coping mechanisms  - Assist in developing anxiety-reducing skills  - Administer/offer alternative therapies  - Limit or eliminate stimulants  Outcome: Progressing     Problem: Knowledge Deficit  Goal: Patient/family/caregiver demonstrates understanding of disease process, treatment plan, medications, and discharge instructions  Description: Complete learning assessment and assess knowledge base.  Interventions:  - Provide teaching at level of understanding  - Provide teaching via preferred learning methods  Outcome: Progressing     Problem: SLEEP DISTURBANCE  Goal: Will exhibit normal sleeping pattern  Description: Interventions:  -  Assess the patients sleep pattern, noting recent changes  - Administer medication as ordered  - Decrease environmental stimuli, including noise, as appropriate during the night  - Encourage the patient to actively participate in unit groups and or exercise during the day to enhance ability to achieve adequate sleep at night  - Assess the patient, in the morning, encouraging a description of sleep experience  Outcome: Progressing

## 2024-10-10 NOTE — PROGRESS NOTES
10/10/24 0841   Team Meeting   Meeting Type Daily Rounds   Team Members Present   Team Members Present Physician;;Nurse   Physician Team Member Aria   Nursing Team Member [facility] Management Team Member Noa   Patient/Family Present   Patient Present No   Patient's Family Present No     Pt completed the SSD application. Pt is pleasant and cooperative. Pt is medication and meal compliant. Pt's discharge is pending placement.

## 2024-10-10 NOTE — NURSING NOTE
Pt walking in [place] at start of shift with downward gaze. Upon assessment, pt states he got some bad news that he may not qualify for SSI because he has too many assets. Pt appears sad/depressed and has retired to bed early. He denies symptoms and needs and states he remains hopeful that something good will happen. Support provided. Safety checks continue.

## 2024-10-10 NOTE — CASE MANAGEMENT
"Writer met with patient again, discussed possibility of pt not meeting criteria for SSDI due to not having enough work credits, and possibility of not meeting criteria for SSI due to amount of assets reported. Writer asked patient what he would consider as a \"plan B\" option. Pt stated he was unsure, as he had been focused on plan for group home and SSD/SSI. Pt not opposed to spending his own money for housing/residential if needed, though he expressed concern for emergency needs.     Pt discussed having siblings, which he had not mentioned to writer previously. He stated he had not spoken to them in many years and has no plans to reunite with them, though there is no specific reason, just that much time has passed, and he believes it would be awkward.     Pt spoke further about how his rent and utilities used to be paid out of a joint account that he shared with his mother and the funds were automatically deducted. When his mother passed away, the account was frozen, and the money was transferred into his name alone- a different account, and the bills were no longer automatically deducted. It was unclear to this writer why the patient chose not to set those accounts up again- as patient evidenced ability to use the computer while meeting with writer- he created an account or Social Security ( or attempted to, though there was a glitch, or a system error that prevented the account from successfully being created, it id not appear to be user error).     Pt reports he had been feeling very depressed at that time, and hopeless. Pt reports feeling better now- he does appear flat, constricted. He is pleasant, cooperative, thankful, and does interact, however, is also quiet, mostly answering questioning, not initiating conversation, and will smile on occasion, though it will only last briefly.     Pt reports he does not like to interact much with others, however, he also shows no desire to live independently. Any " discussion regarding discharge appears to trigger anxiety for Franco. He thanked writer and agreed to meet again next week to contact [facility] to find out why his online account could not be created.

## 2024-10-10 NOTE — PROGRESS NOTES
"  Progress Note - Behavioral Health   Name: Franco Roberson 39 y.o. male I MRN: [id number]  Unit/Bed#: -02 I Date of Admission: 5/14/2024   Date of Service: 10/10/2024 I Hospital Day: 149     Assessment & Plan  MDD (major depressive disorder), recurrent severe, without psychosis (HCC)  No medications changes at this time, will continue to monitor and optimize as indicated:  Abilify 5 mg daily as mood adjunct   Zoloft 150 mg daily for depression and anxiety    Continue to encourage participation in group therapy, milieu therapy and occupational therapy.  Continue to assess for side effects of medications.  Continue collaboration with [facility] for medical co-morbidities as indicated.  Continue discussion with CM/SW to assist with obtaining collateral, disposition planning, and the implementation of patient-centered individualized plan of care.  Continue frequent safety checks and vitals per unit protocol.    Risks, benefits and possible side effects of Medications: Risks, benefits, and possible side effects of medications have previously been explained. No new medications at this time.      Legal status: 201    Disposition: to be determined, pending SOAR application for potential group home placement. OT Cognitive Evaluation completed: \"Pt would benefit from discharge to a supportive environment that can provide checks for safety and compliance with IADLs. \"    Autism spectrum disorder  Continue supportive care    Current medications:  Current Facility-Administered Medications   Medication Dose Route Frequency Provider Last Rate    acetaminophen  650 mg Oral Q6H PRN Basilio Brown MD      acetaminophen  650 mg Oral Q4H PRN Basilio Brown MD      acetaminophen  975 mg Oral Q6H PRN Basilio Brown MD      aluminum-magnesium hydroxide-simethicone  30 mL Oral Q4H PRN Basilio Brown MD      ARIPiprazole  5 mg Oral Daily Basilio Panda MD      Artificial Tears  1 drop Both Eyes Q3H PRN Basilio Garcia" MD Karyn      atorvastatin  10 mg Oral Daily With Dinner Lauraainsley Romegrady Ashford, CRDARIEN      benztropine  1 mg Intramuscular Q4H PRN Max 6/day Basilio Brown MD      benztropine  1 mg Oral Q4H PRN Max 6/day Basilio Brown MD      Cholecalciferol  2,000 Units Oral Daily Laura Diasomid, CRNP      cyanocobalamin  1,000 mcg Oral Daily Laura Clark Makeda CRNP      Diclofenac Sodium  2 g Topical 4x Daily PRN Laura Ambergrady Ashford, CRNP      hydrOXYzine HCL  50 mg Oral Q6H PRN Max 4/day Basilio Brown MD      Or    diphenhydrAMINE  50 mg Intramuscular Q6H PRN Basilio Brown MD      diphenhydrAMINE-zinc acetate   Topical Daily PRN Raj Guerrero MD      famotidine  20 mg Oral BID Laura Clark Makeda, CRNP      hydrOXYzine HCL  100 mg Oral Q6H PRN Max 4/day Basilio Brown MD      Or    LORazepam  2 mg Intramuscular Q6H PRN Basilio Brown MD      hydrOXYzine HCL  25 mg Oral Q6H PRN Max 4/day Basilio Brown MD      levothyroxine  37.5 mcg Oral Early Morning Laura Clark Makeda, ARJUNNP      melatonin  6 mg Oral HS PRN WALLY Gomez      methocarbamol  500 mg Oral Q6H PRN Lauraainsley Ashford CRNP      OLANZapine  5 mg Oral Q4H PRN Max 3/day Basilio Brown MD      Or    OLANZapine  2.5 mg Intramuscular Q4H PRN Max 3/day Basilio Brown MD      OLANZapine  5 mg Oral Q3H PRN Max 3/day Basilio Brown MD      Or    OLANZapine  5 mg Intramuscular Q3H PRN Max 3/day Basilio Brown MD      OLANZapine  2.5 mg Oral Q4H PRN Max 6/day Basilio Brown MD      polyethylene glycol  17 g Oral Daily PRN Basilio Brown MD      propranolol  10 mg Oral Q8H PRN Basilio Brown MD      senna-docusate sodium  1 tablet Oral Daily PRN Basilio Brown MD      sertraline  150 mg Oral Daily WALLY Gomez      traZODone  50 mg Oral HS PRN Basilio Brown MD         Risks / Benefits of Treatment:    Risks, benefits, and possible side  effects of medications explained to patient and patient verbalizes understanding and agreement for treatment.    Subjective:    Behavior over the last 24 hours: unchanged.     The patient was evaluated this morning for continuity of care and no acute distress noted throughout the evaluation.  Over the past 24 hours staff noted the patient was visible on the unit, writing in his journal, pleasant with peers and staff. He remains compliant with meals and medications.    Patient was visited on unit for continuing care; chart reviewed and discussed with multidisciplinary treatment team.  On approach, the patient was calm, pleasant and cooperative. Denied any changes in mood, appetite, and energy level. No problem initiating and maintaining sleep.  Denied A/VH currently.  Denied SI/HI, intent or plan upon direct inquiry at this time.  Franco reports that he is waiting to meet with  Irena to go over the Social Security paperwork.    Patient continues to be visible in the milieu and interacts with staff and peers. No reports of aggression or self-injurious behavior on unit. No PRN medications used in the past 24 hours.    Patient accepted all offered medications and no adverse effects of medications noted or reported.    Sleep: normal  Appetite: normal  Medication side effects: No   ROS: no complaints    Mental Status Evaluation:  Mental Status Examination:  Appearance:  age appropriate, casually dressed, marginal hygiene, looks stated age   Behavior:  pleasant, cooperative, calm   Speech:  normal rate and volume, scant   Mood:  euthymic   Affect:  constricted   Thought Process:  logical, coherent, linear   Associations: intact associations   Thought Content:  no overt delusions   Perceptual Disturbances: no auditory hallucinations, no visual hallucinations, does not appear responding to internal stimuli   Risk Potential: Suicidal ideation - None at present  Homicidal ideation - None at present  Potential  for aggression - No   Sensorium:  oriented to person, place, and time/date   Memory:  recent and remote memory grossly intact   Consciousness:  alert and awake   Attention/Concentration: attention span and concentration are age appropriate   Insight:  limited   Judgment: limited   Gait/Station: normal gait/station   Motor Activity: no abnormal movements       Vital signs in last 24 hours:    Temp:  [97.3 °F (36.3 °C)-97.4 °F (36.3 °C)] 97.3 °F (36.3 °C)  HR:  [61-78] 61  BP: (111-124)/(65-75) 111/65  Resp:  [16] 16  SpO2:  [97 %-98 %] 98 %  O2 Device: None (Room air)         Laboratory results: I have personally reviewed all pertinent laboratory/tests results    Most Recent Labs:   Lab Results   Component Value Date    WBC 7.01 06/27/2024    RBC 4.54 06/27/2024    HGB 14.5 06/27/2024    HCT 44.4 06/27/2024     06/27/2024    RDW 12.0 06/27/2024    NEUTROABS 4.01 06/27/2024    SODIUM 139 07/10/2024    K 3.8 07/10/2024     07/10/2024    CO2 27 07/10/2024    BUN 16 07/10/2024    CREATININE 0.92 07/10/2024    GLUC 87 07/10/2024    CALCIUM 9.3 07/10/2024    AST 28 07/10/2024    ALT 36 07/10/2024    ALKPHOS 66 07/10/2024    TP 7.7 07/10/2024    ALB 4.4 07/10/2024    TBILI 0.55 07/10/2024    CHOLESTEROL 168 09/19/2024    HDL 42 09/19/2024    TRIG 137 09/19/2024    LDLCALC 99 09/19/2024    NONHDLC 126 09/19/2024    GQW4GCZTSNSK 6.241 (H) 09/10/2024    FREET4 0.56 (L) 09/10/2024    SYPHILISAB Non-reactive 05/15/2024       Progress Toward Goals: Franco is currently assessed as being at their baseline with continued need for medication management, supervision for safety and ADL’s. These services are not currently available in a less restrictive environment necessitating continued hospital stay.  Franco should remain on the unit until these services are available, due to likelihood of mental decompensation and readmission if discharged to an unsupervised setting.  Assertive discharge planning and collaboration with  multiple providers (inpatient and community based) remains ongoing.  Franco is currently awaiting for group home vs [facility]. Social Security unlikely given patient's 30,000$ of savings.      Counseling / Coordination of Care:    Administrative Statements   I have spent a total time of 30 minutes in caring for this patient on the day of the visit/encounter including Counseling / Coordination of care, Documenting in the medical record, Obtaining or reviewing history  , and Communicating with other healthcare professionals .    Lita Knapp,  10/10/24    This note was completed in part utilizing Dragon dictation Software. Grammatical, translation, syntax errors, random word insertions, spelling mistakes, and incomplete sentences may be an occasional consequence of this system secondary to software limitations with voice recognition, ambient noise, and hardware issues. If you have any questions or concerns about the content, text, or information contained within the body of this dictation, please contact the provider for clarification.

## 2024-10-11 PROCEDURE — 99232 SBSQ HOSP IP/OBS MODERATE 35: CPT | Performed by: PSYCHIATRY & NEUROLOGY

## 2024-10-11 RX ADMIN — ATORVASTATIN CALCIUM 10 MG: 10 TABLET, FILM COATED ORAL at 15:54

## 2024-10-11 RX ADMIN — LEVOTHYROXINE SODIUM 37.5 MCG: 125 TABLET ORAL at 06:17

## 2024-10-11 RX ADMIN — FAMOTIDINE 20 MG: 20 TABLET ORAL at 08:58

## 2024-10-11 RX ADMIN — FAMOTIDINE 20 MG: 20 TABLET ORAL at 17:51

## 2024-10-11 RX ADMIN — CYANOCOBALAMIN TAB 1000 MCG 1000 MCG: 1000 TAB at 08:58

## 2024-10-11 RX ADMIN — SERTRALINE HYDROCHLORIDE 150 MG: 100 TABLET ORAL at 08:58

## 2024-10-11 RX ADMIN — ARIPIPRAZOLE 5 MG: 5 TABLET ORAL at 08:58

## 2024-10-11 RX ADMIN — CHOLECALCIFEROL TAB 25 MCG (1000 UNIT) 2000 UNITS: 25 TAB at 08:58

## 2024-10-11 NOTE — TREATMENT PLAN
TREATMENT PLAN REVIEW - Behavioral Health Franco Roberson 39 y.o. 1985 male MRN: [id number]    [facility] 3B BEHAVIOR[facility] Room / Bed: [facility] 345/[facility] 345-02 Encounter: [id number]          Admit Date/Time:  5/14/2024  4:45 PM    Treatment Team:   DO Isaac Mejias RN [facility] Rosado    Diagnosis: Principal Problem:    MDD (major depressive disorder), recurrent severe, without psychosis (HCC)  Active Problems:    Unspecified depressive disorder (HCC)    Medical clearance for psychiatric admission    Hyperlipidemia    Vitamin D deficiency    B12 deficiency    Hypothyroidism    Autism spectrum disorder      Patient Strengths/Assets: Adapts well, cooperative    Patient Barriers/Limitations: Homeless, limited cognition, lack of family support    Short Term Goals: Decrease in depressive symptoms, decrease in anxiety symptoms    Long Term Goals: resolution of depressive symptoms, stabilization of mood, free of suicidal thoughts, adequate self care, adequate sleep, adequate appetite    Progress Towards Goals: continue psychiatric medications as prescribed, improving, attends groups more often, participates in milieu therapy    Recommended Treatment: medication management, patient medication education, group therapy, milieu therapy, continued Behavioral Health psychiatric evaluation/assessment process    Treatment Frequency: daily medication monitoring, group and milieu therapy daily, monitoring through interdisciplinary rounds, monitoring through weekly patient care conferences    Expected Discharge Date:  TBD    Discharge Plan: referrals as indicated, placement pending    Treatment Plan Created/Updated By: Lita Knapp DO

## 2024-10-11 NOTE — NURSING NOTE
Patient is visible on unit, isolative to self. Medication and meal compliant. Denies SI/HI/AVH and any unmet needs at this time.

## 2024-10-11 NOTE — PROGRESS NOTES
"Progress Note - Behavioral Health   Name: Franco Roberson 39 y.o. male I MRN: [id number]  Unit/Bed#: -02 I Date of Admission: 5/14/2024   Date of Service: 10/11/2024 I Hospital Day: 150     Assessment & Plan  MDD (major depressive disorder), recurrent severe, without psychosis (HCC)  No medications changes at this time, will continue to monitor and optimize as indicated:  Abilify 5 mg daily as mood adjunct   Zoloft 150 mg daily for depression and anxiety    Continue to encourage participation in group therapy, milieu therapy and occupational therapy.  Continue to assess for side effects of medications.  Continue collaboration with SLIM for medical co-morbidities as indicated.  Continue discussion with CM/SW to assist with obtaining collateral, disposition planning, and the implementation of patient-centered individualized plan of care.  Continue frequent safety checks and vitals per unit protocol.    Risks, benefits and possible side effects of Medications: Risks, benefits, and possible side effects of medications have previously been explained. No new medications at this time.      Legal status: 201    Disposition: to be determined, pending SOAR application for potential group home placement. OT Cognitive Evaluation completed: \"Pt would benefit from discharge to a supportive environment that can provide checks for safety and compliance with IADLs. \"    Autism spectrum disorder  Continue supportive care    Current medications:  Current Facility-Administered Medications   Medication Dose Route Frequency Provider Last Rate    acetaminophen  650 mg Oral Q6H PRN Basilio Brown MD      acetaminophen  650 mg Oral Q4H PRN Basilio Brown MD      acetaminophen  975 mg Oral Q6H PRN Basilio Brown MD      aluminum-magnesium hydroxide-simethicone  30 mL Oral Q4H PRN Basilio Brown MD      ARIPiprazole  5 mg Oral Daily Basilio Panda MD      Artificial Tears  1 drop Both Eyes Q3H PRN Basilio Garcia" MD Karyn      atorvastatin  10 mg Oral Daily With Dinner Lauraainsley Romegrady Ashford, CRDARIEN      benztropine  1 mg Intramuscular Q4H PRN Max 6/day Basilio Brown MD      benztropine  1 mg Oral Q4H PRN Max 6/day Basilio Brown MD      Cholecalciferol  2,000 Units Oral Daily Laura Diasomid, CRNP      cyanocobalamin  1,000 mcg Oral Daily Laura Clark Makeda CRNP      Diclofenac Sodium  2 g Topical 4x Daily PRN Laura Ambergrady Ashford, CRNP      hydrOXYzine HCL  50 mg Oral Q6H PRN Max 4/day Basilio Brown MD      Or    diphenhydrAMINE  50 mg Intramuscular Q6H PRN Basilio Brown MD      diphenhydrAMINE-zinc acetate   Topical Daily PRN Raj Guerrero MD      famotidine  20 mg Oral BID Laura Clark Makeda, CRNP      hydrOXYzine HCL  100 mg Oral Q6H PRN Max 4/day Basilio Brown MD      Or    LORazepam  2 mg Intramuscular Q6H PRN Basilio Brown MD      hydrOXYzine HCL  25 mg Oral Q6H PRN Max 4/day Basilio Brown MD      levothyroxine  37.5 mcg Oral Early Morning Laura Clark Makeda, ARJUNNP      melatonin  6 mg Oral HS PRN WALLY Gomez      methocarbamol  500 mg Oral Q6H PRN Lauraainsley Ashford CRNP      OLANZapine  5 mg Oral Q4H PRN Max 3/day Basilio Brown MD      Or    OLANZapine  2.5 mg Intramuscular Q4H PRN Max 3/day Basilio Brown MD      OLANZapine  5 mg Oral Q3H PRN Max 3/day Basilio Brown MD      Or    OLANZapine  5 mg Intramuscular Q3H PRN Max 3/day Basilio Brown MD      OLANZapine  2.5 mg Oral Q4H PRN Max 6/day Basilio Brown MD      polyethylene glycol  17 g Oral Daily PRN Basilio Brown MD      propranolol  10 mg Oral Q8H PRN Basilio Brown MD      senna-docusate sodium  1 tablet Oral Daily PRN Basilio Brown MD      sertraline  150 mg Oral Daily WALLY Gomez      traZODone  50 mg Oral HS PRN Basilio Brown MD         Risks / Benefits of Treatment:    Risks, benefits, and possible side  effects of medications explained to patient and patient verbalizes understanding and agreement for treatment.    Subjective:    Behavior over the last 24 hours: unchanged.     The patient was evaluated this morning for continuity of care and no acute distress noted throughout the evaluation.  Over the past 24 hours staff noted the patient was pleasant and cooperative upon approach. Denies psych symptoms when speaking to staff. Pt walking down the hallway and appears depressed after receiving news that he would not qualify for SSI.       Patient was visited on unit for continuing care; chart reviewed and discussed with multidisciplinary treatment team.  On approach, the patient was calm and cooperative.  Franco states that he was feeling a bit sad after finding out Social Security may not work out.  He states that he is feeling more optimistic and mood has improved.  Denied any changes in mood, appetite, and energy level. No problem initiating and maintaining sleep.  Denied A/VH currently.  Denied SI/HI, intent or plan upon direct inquiry at this time.    Patient continues to be visible in the milieu and interacts with staff and peers. No reports of aggression or self-injurious behavior on unit. No PRN medications used in the past 24 hours.    Patient accepted all offered medications and no adverse effects of medications noted or reported.    Sleep: normal  Appetite: normal  Medication side effects: No   ROS: no complaints    Mental Status Evaluation:  Mental Status Examination:  Appearance:  age appropriate, casually dressed, looks stated age   Behavior:  pleasant, cooperative, calm   Speech:  normal rate and volume, scant   Mood:  euthymic   Affect:  constricted   Thought Process:  logical, coherent, goal directed   Associations: concrete associations   Thought Content:  no overt delusions   Perceptual Disturbances: no auditory hallucinations, no visual hallucinations, does not appear responding to internal stimuli    Risk Potential: Suicidal ideation - None at present  Homicidal ideation - None at present  Potential for aggression - No   Sensorium:  oriented to person, place, and time/date   Memory:  recent and remote memory grossly intact   Consciousness:  alert and awake   Attention/Concentration: attention span and concentration are age appropriate   Insight:  limited   Judgment: limited   Gait/Station: normal gait/station   Motor Activity: no abnormal movements       Vital signs in last 24 hours:    Temp:  [96.5 °F (35.8 °C)] 96.5 °F (35.8 °C)  HR:  [62] 62  BP: (129)/(77) 129/77  Resp:  [16] 16  SpO2:  [96 %] 96 %  O2 Device: None (Room air)         Laboratory results: I have personally reviewed all pertinent laboratory/tests results    Last Laboratory Results with Depakote and/or Tegretol levels:   Lab Results   Component Value Date    WBC 7.01 06/27/2024    RBC 4.54 06/27/2024    HGB 14.5 06/27/2024    HCT 44.4 06/27/2024     06/27/2024    RDW 12.0 06/27/2024    NEUTROABS 4.01 06/27/2024    SODIUM 139 07/10/2024    K 3.8 07/10/2024     07/10/2024    CO2 27 07/10/2024    BUN 16 07/10/2024    CREATININE 0.92 07/10/2024    GLUC 87 07/10/2024    CALCIUM 9.3 07/10/2024    AST 28 07/10/2024    ALT 36 07/10/2024    ALKPHOS 66 07/10/2024    TP 7.7 07/10/2024    ALB 4.4 07/10/2024    TBILI 0.55 07/10/2024       Progress Toward Goals: Franco is currently assessed as being at their baseline with continued need for medication management, supervision for safety and ADL’s. These services are not currently available in a less restrictive environment necessitating continued hospital stay.  Franco should remain on the unit until these services are available, due to likelihood of mental decompensation and readmission if discharged to an unsupervised setting.  Assertive discharge planning and collaboration with multiple providers (inpatient and community based) remains ongoing.  Franco is currently awaiting for Group home  versus safe [place].      Counseling / Coordination of Care:        Lita Knapp,  10/11/24    This note was completed in part utilizing Dragon dictation Software. Grammatical, translation, syntax errors, random word insertions, spelling mistakes, and incomplete sentences may be an occasional consequence of this system secondary to software limitations with voice recognition, ambient noise, and hardware issues. If you have any questions or concerns about the content, text, or information contained within the body of this dictation, please contact the provider for clarification.

## 2024-10-11 NOTE — PROGRESS NOTES
10/11/24 0832   Team Meeting   Meeting Type Daily Rounds   Team Members Present   Team Members Present Physician;;Nurse   Physician Team Member Aria   Nursing Team Member [facility] Management Team Member Noa   Patient/Family Present   Patient Present No   Patient's Family Present No     Pt is medication and meal compliant. Pt denies SI/HI/AVH. Pt is working with CM to complete SSD application. Pt is pleasant and calm. Pt is social with select peers. Pt's discharge is pending placement.

## 2024-10-11 NOTE — PLAN OF CARE
Problem: Ineffective Coping  Goal: Identifies ineffective coping skills  Outcome: Progressing  Goal: Identifies healthy coping skills  Outcome: Progressing  Goal: Demonstrates healthy coping skills  Outcome: Progressing     Problem: Risk for Self Injury/Neglect  Goal: Treatment Goal: Remain safe during length of stay, learn and adopt new coping skills, and be free of self-injurious ideation, impulses and acts at the time of discharge  Outcome: Progressing     Problem: Depression  Goal: Treatment Goal: Demonstrate behavioral control of depressive symptoms, verbalize feelings of improved mood/affect, and adopt new coping skills prior to discharge  Outcome: Progressing

## 2024-10-12 PROCEDURE — 99232 SBSQ HOSP IP/OBS MODERATE 35: CPT | Performed by: PSYCHIATRY & NEUROLOGY

## 2024-10-12 RX ADMIN — SERTRALINE HYDROCHLORIDE 150 MG: 100 TABLET ORAL at 08:56

## 2024-10-12 RX ADMIN — ARIPIPRAZOLE 5 MG: 5 TABLET ORAL at 08:56

## 2024-10-12 RX ADMIN — CYANOCOBALAMIN TAB 1000 MCG 1000 MCG: 1000 TAB at 08:56

## 2024-10-12 RX ADMIN — ATORVASTATIN CALCIUM 10 MG: 10 TABLET, FILM COATED ORAL at 17:30

## 2024-10-12 RX ADMIN — FAMOTIDINE 20 MG: 20 TABLET ORAL at 08:56

## 2024-10-12 RX ADMIN — LEVOTHYROXINE SODIUM 37.5 MCG: 125 TABLET ORAL at 06:27

## 2024-10-12 RX ADMIN — CHOLECALCIFEROL TAB 25 MCG (1000 UNIT) 2000 UNITS: 25 TAB at 08:56

## 2024-10-12 RX ADMIN — FAMOTIDINE 20 MG: 20 TABLET ORAL at 17:30

## 2024-10-12 NOTE — ASSESSMENT & PLAN NOTE
"No medications changes at this time, will continue to monitor and optimize as indicated:  Abilify 5 mg daily as mood adjunct   Zoloft 150 mg daily for depression and anxiety    Continue to encourage participation in group therapy, milieu therapy and occupational therapy.  Continue to assess for side effects of medications.  Continue collaboration with PATRICIA for medical co-morbidities as indicated.  Continue discussion with CM/SW to assist with obtaining collateral, disposition planning, and the implementation of patient-centered individualized plan of care.  Continue frequent safety checks and vitals per unit protocol.    Risks, benefits and possible side effects of Medications: Risks, benefits, and possible side effects of medications have previously been explained. No new medications at this time.      Legal status: 201    Disposition: to be determined, pending SOAR application for potential group home placement. OT Cognitive Evaluation completed: \"Pt would benefit from discharge to a supportive environment that can provide checks for safety and compliance with IADLs. \"      No associated orders from this encounter found during lookback period of 72 hours.  "

## 2024-10-12 NOTE — NURSING NOTE
Only out of room for meals. Pleasant, calm and cooperative during interaction. Denies symptoms. Compliant. Waiting placement.

## 2024-10-12 NOTE — PROGRESS NOTES
"    Psychiatric Progress Note - Department of Behavioral Health   Franco Roberson 39 y.o. male MRN: [id number]  Unit/Bed#: [facility] 345-02 Encounter: [id number]    ASSESSMENT & PLAN     Assessment & Plan  MDD (major depressive disorder), recurrent severe, without psychosis (HCC)  No medications changes at this time, will continue to monitor and optimize as indicated:  Abilify 5 mg daily as mood adjunct   Zoloft 150 mg daily for depression and anxiety    Continue to encourage participation in group therapy, milieu therapy and occupational therapy.  Continue to assess for side effects of medications.  Continue collaboration with SLIM for medical co-morbidities as indicated.  Continue discussion with CM/SW to assist with obtaining collateral, disposition planning, and the implementation of patient-centered individualized plan of care.  Continue frequent safety checks and vitals per unit protocol.    Risks, benefits and possible side effects of Medications: Risks, benefits, and possible side effects of medications have previously been explained. No new medications at this time.      Legal status: 201    Disposition: to be determined, pending SOAR application for potential group home placement. OT Cognitive Evaluation completed: \"Pt would benefit from discharge to a supportive environment that can provide checks for safety and compliance with IADLs. \"      No associated orders from this encounter found during lookback period of 72 hours.  Autism spectrum disorder  Continue supportive care    No associated orders from this encounter found during lookback period of 72 hours.      Treatment Recommendations/Precautions:  Continue to promote patient participation in therapeutic milieu.  Continue medical management per medicine.  Continue previously prescribed psychotropic medication regimen; see below.  Continue behavioral health checks q.15 minutes.   Continue vitals per behavioral health unit protocol.  Discharge date per primary " team    SUBJECTIVE     Patient evaluated this a.m. for continuity of care. Patient was discussed at length with nursing and treatment team. Per nursing, patient remains calm, cooperative, although isolative in the milieu, adherent to his medications without any acute adverse effects. No acute distress is noted throughout evaluation. Franco Roberson denies suicidal/homicidal ideation in addition to thoughts of self-injury, receptive to crisis planning provided by this writer, contacting for safety in the inpatient setting, admitting to an ability to appropriately confide in staff including this writer. Patient appears scant, sparse, superficial, denying any/all psychiatric complaints/concerns    PSYCHIATRIC REVIEW OF SYSTEMS     Behavior over the last 24 hours: Unchanged  Sleep: Adequate  Appetite: Adequate  Medication side effects: None    REVIEW OF SYSTEMS   Review of systems: No complaints    OBJECTIVE     Vital Signs in Past 24 Hours:  Temp:  [96.6 °F (35.9 °C)-97.4 °F (36.3 °C)] 97.4 °F (36.3 °C)  HR:  [66-78] 66  BP: (119-129)/(72-75) 129/75  Resp:  [16] 16  SpO2:  [96 %] 96 %  O2 Device: None (Room air)    Intake/Output in Past 24 hours:  No intake/output data recorded.  No intake/output data recorded.        Laboratory Results:  I have personally reviewed all pertinent laboratory/tests results.  Most Recent Labs:   Lab Results   Component Value Date    WBC 7.01 06/27/2024    RBC 4.54 06/27/2024    HGB 14.5 06/27/2024    HCT 44.4 06/27/2024     06/27/2024    RDW 12.0 06/27/2024    NEUTROABS 4.01 06/27/2024    SODIUM 139 07/10/2024    K 3.8 07/10/2024     07/10/2024    CO2 27 07/10/2024    BUN 16 07/10/2024    CREATININE 0.92 07/10/2024    GLUC 87 07/10/2024    GLUF 87 07/10/2024    CALCIUM 9.3 07/10/2024    AST 28 07/10/2024    ALT 36 07/10/2024    ALKPHOS 66 07/10/2024    TP 7.7 07/10/2024    ALB 4.4 07/10/2024    TBILI 0.55 07/10/2024    CHOLESTEROL 168 09/19/2024    HDL 42 09/19/2024    TRIG 137  09/19/2024    LDLCALC 99 09/19/2024    NONHDLC 126 09/19/2024    [id number] 6.241 (H) 09/10/2024    FREET4 0.56 (L) 09/10/2024       Behavioral Health Medications: all current active meds have been reviewed and current meds:   Current Facility-Administered Medications:     acetaminophen (TYLENOL) tablet 650 mg, Q6H PRN    acetaminophen (TYLENOL) tablet 650 mg, Q4H PRN    acetaminophen (TYLENOL) tablet 975 mg, Q6H PRN    aluminum-magnesium hydroxide-simethicone (MAALOX) oral suspension 30 mL, Q4H PRN    ARIPiprazole (ABILIFY) tablet 5 mg, Daily    Artificial Tears ophthalmic solution 1 drop, Q3H PRN    atorvastatin (LIPITOR) tablet 10 mg, Daily With Dinner    benztropine (COGENTIN) injection 1 mg, Q4H PRN Max 6/day    benztropine (COGENTIN) tablet 1 mg, Q4H PRN Max 6/day    Cholecalciferol (VITAMIN D3) tablet 2,000 Units, Daily    cyanocobalamin (VITAMIN B-12) tablet 1,000 mcg, Daily    Diclofenac Sodium (VOLTAREN) 1 % topical gel 2 g, 4x Daily PRN    hydrOXYzine HCL (ATARAX) tablet 50 mg, Q6H PRN Max 4/day **OR** diphenhydrAMINE (BENADRYL) injection 50 mg, Q6H PRN    diphenhydrAMINE-zinc acetate (BENADRYL) 2-0.1 % cream, Daily PRN    famotidine (PEPCID) tablet 20 mg, BID    hydrOXYzine HCL (ATARAX) tablet 100 mg, Q6H PRN Max 4/day **OR** LORazepam (ATIVAN) injection 2 mg, Q6H PRN    hydrOXYzine HCL (ATARAX) tablet 25 mg, Q6H PRN Max 4/day    levothyroxine tablet 37.5 mcg, Early Morning    melatonin tablet 6 mg, HS PRN    methocarbamol (ROBAXIN) tablet 500 mg, Q6H PRN    OLANZapine (ZyPREXA) tablet 5 mg, Q4H PRN Max 3/day **OR** OLANZapine (ZyPREXA) IM injection 2.5 mg, Q4H PRN Max 3/day    OLANZapine (ZyPREXA) tablet 5 mg, Q3H PRN Max 3/day **OR** OLANZapine (ZyPREXA) IM injection 5 mg, Q3H PRN Max 3/day    OLANZapine (ZyPREXA) tablet 2.5 mg, Q4H PRN Max 6/day    polyethylene glycol (MIRALAX) packet 17 g, Daily PRN    propranolol (INDERAL) tablet 10 mg, Q8H PRN    senna-docusate sodium (SENOKOT S) 8.6-50 mg per  tablet 1 tablet, Daily PRN    sertraline (ZOLOFT) tablet 150 mg, Daily    traZODone (DESYREL) tablet 50 mg, HS PRN.    Risks, benefits and possible side effects of Medications:   Risks, benefits, and possible side effects of medications explained to patient and patient verbalizes understanding.      Mental Status Evaluation:  Appearance:  age appropriate, casually dressed, and disheveled   Behavior:  psychomotor retardation, superficial   Speech:  normal pitch and normal volume   Mood:  euthymic   Affect:  mood-incongruent and restricted   Language sparse   Thought Process:  goal directed   Thought Content:  no overt obsessions or delusions   Perceptual Disturbances: None   Risk Potential: Suicidal Ideations none, Homicidal Ideations none, and Potential for Aggression No   Sensorium:  person, place, and time/date   Cognition:  recent and remote memory grossly intact   Consciousness:  alert and awake    Attention: attention span appeared shorter than expected for age   Insight:  limited   Judgment: limited   Intellect Not assessed   Gait/Station: normal gait/station and normal balance   Motor Activity: no abnormal movements     Memory: Short and long term memory  fair     Progress Toward Goals: unchanged, as evidenced by their participation (or lack thereof) in individual, social and therapeutic milieu in addition to adherence to their medication regimen.    Recommended Treatment:   See above for assessment and plan.    Inpatient Psychiatric Certification: Based upon physical, mental and social evaluations, I certify that inpatient psychiatric services are medically necessary for this patient for a duration of greater than 2 midnights for the treatment of MDD (major depressive disorder), recurrent severe, without psychosis (HCC) including psychotropic medication management, participation in the therapeutic milieu and referrals as indicated. Available alternative community resources do not meet the patient's mental  health care needs. I further attest that an established written individualized plan of care has been implemented and is outlined in the patient's medical records.    Counseling/Coordination of Care    I have expended greater than 15 minutes in which more than 50% of this time was expended in counseling/coordination of patient care relating to diagnostic results, prognosis, potential risks and benefits of management options, instructions for appropriate management, patient and/or collateral education, importance of adherence to management and/or risk factor reductions. Patient's rights, confidentiality, exceptions to confidentiality, use of electronic medical record including appropriate staff access to medical record regarding behavioral health services and consent to treatment were reviewed.    Note Share:     This note was not shared with the patient due to reasonable likelihood of causing patient harm     This note has been constructed using a voice recognition system. There may be translation, syntax,  or grammatical errors. If you have any questions, please contact the dictating provider.    Jordan Christopher Holter, DO 10/12/24

## 2024-10-12 NOTE — PLAN OF CARE
Problem: Ineffective Coping  Goal: Identifies ineffective coping skills  Outcome: Progressing  Goal: Identifies healthy coping skills  Outcome: Progressing  Goal: Demonstrates healthy coping skills  Outcome: Progressing  Goal: Participates in unit activities  Description: Interventions:  - Provide therapeutic environment   - Provide required programming   - Redirect inappropriate behaviors   Outcome: Progressing     Problem: Risk for Self Injury/Neglect  Goal: Treatment Goal: Remain safe during length of stay, learn and adopt new coping skills, and be free of self-injurious ideation, impulses and acts at the time of discharge  Outcome: Progressing     Problem: Depression  Goal: Treatment Goal: Demonstrate behavioral control of depressive symptoms, verbalize feelings of improved mood/affect, and adopt new coping skills prior to discharge  Outcome: Progressing  Goal: Verbalize thoughts and feelings  Description: Interventions:  - Assess and re-assess patient's level of risk   - Engage patient in 1:1 interactions, daily, for a minimum of 15 minutes   - Encourage patient to express feelings, fears, frustrations, hopes   Outcome: Progressing  Goal: Refrain from harming self  Description: Interventions:  - Monitor patient closely, per order   - Supervise medication ingestion, monitor effects and side effects   Outcome: Progressing  Goal: Refrain from isolation  Description: Interventions:  - Develop a trusting relationship   - Encourage socialization   Outcome: Progressing  Goal: Refrain from self-neglect  Outcome: Progressing  Goal: Attend and participate in unit activities, including therapeutic, recreational, and educational groups  Description: Interventions:  - Provide therapeutic and educational activities daily, encourage attendance and participation, and document same in the medical record   Outcome: Progressing  Goal: Complete daily ADLs, including personal hygiene independently, as able  Description:  Interventions:  - Observe, teach, and assist patient with ADLS  -  Monitor and promote a balance of rest/activity, with adequate nutrition and elimination   Outcome: Progressing     Problem: Anxiety  Goal: Anxiety is at manageable level  Description: Interventions:  - Assess and monitor patient's anxiety level.   - Monitor for signs and symptoms (heart palpitations, chest pain, shortness of breath, headaches, nausea, feeling jumpy, restlessness, irritable, apprehensive).   - Collaborate with interdisciplinary team and initiate plan and interventions as ordered.  - Sandy patient to unit/surroundings  - Explain treatment plan  - Encourage participation in care  - Encourage verbalization of concerns/fears  - Identify coping mechanisms  - Assist in developing anxiety-reducing skills  - Administer/offer alternative therapies  - Limit or eliminate stimulants  Outcome: Progressing     Problem: Knowledge Deficit  Goal: Patient/family/caregiver demonstrates understanding of disease process, treatment plan, medications, and discharge instructions  Description: Complete learning assessment and assess knowledge base.  Interventions:  - Provide teaching at level of understanding  - Provide teaching via preferred learning methods  Outcome: Progressing     Problem: SLEEP DISTURBANCE  Goal: Will exhibit normal sleeping pattern  Description: Interventions:  -  Assess the patients sleep pattern, noting recent changes  - Administer medication as ordered  - Decrease environmental stimuli, including noise, as appropriate during the night  - Encourage the patient to actively participate in unit groups and or exercise during the day to enhance ability to achieve adequate sleep at night  - Assess the patient, in the morning, encouraging a description of sleep experience  Outcome: Progressing

## 2024-10-12 NOTE — ASSESSMENT & PLAN NOTE
Continue supportive care    No associated orders from this encounter found during lookback period of 72 hours.

## 2024-10-12 NOTE — NURSING NOTE
"Patient remains secluded mainly to room this shift , appears withdrawn and depressed.Patient denies depression and SI/HI/AH/VH at this time. Patient encouraged to partake in activities and patient declined stating \"I'm happy in my room\".   "

## 2024-10-13 PROCEDURE — 99232 SBSQ HOSP IP/OBS MODERATE 35: CPT | Performed by: PSYCHIATRY & NEUROLOGY

## 2024-10-13 RX ADMIN — FAMOTIDINE 20 MG: 20 TABLET ORAL at 17:04

## 2024-10-13 RX ADMIN — ATORVASTATIN CALCIUM 10 MG: 10 TABLET, FILM COATED ORAL at 17:04

## 2024-10-13 RX ADMIN — ARIPIPRAZOLE 5 MG: 5 TABLET ORAL at 08:11

## 2024-10-13 RX ADMIN — CHOLECALCIFEROL TAB 25 MCG (1000 UNIT) 2000 UNITS: 25 TAB at 08:11

## 2024-10-13 RX ADMIN — FAMOTIDINE 20 MG: 20 TABLET ORAL at 08:11

## 2024-10-13 RX ADMIN — CYANOCOBALAMIN TAB 1000 MCG 1000 MCG: 1000 TAB at 08:11

## 2024-10-13 RX ADMIN — LEVOTHYROXINE SODIUM 37.5 MCG: 125 TABLET ORAL at 06:14

## 2024-10-13 RX ADMIN — SERTRALINE HYDROCHLORIDE 150 MG: 100 TABLET ORAL at 08:11

## 2024-10-13 NOTE — NURSING NOTE
Patient is calm and cooperative upon approach. Patient is visible in patient lounge, playing chess with staff. Patient denies SI/HI/AH/VH. Patient is compliant with meds and meals.

## 2024-10-13 NOTE — NURSING NOTE
Patient is calm and cooperative upon approach. Patient is visible on unit, and socializing with peers. Patient denies SI/HI/AH/VH. Patient is compliant with meds and meals.

## 2024-10-13 NOTE — NURSING NOTE
Visible at the start of the shift and doing laps in the hallway. Reports feeling anxious thinking about discharge planning. Declined PRN for anxiety. Denies all other symptoms. Cooperative, pleasant. Waiting placement.

## 2024-10-13 NOTE — PROGRESS NOTES
"    Psychiatric Progress Note - Department of Behavioral Health   Franco Roberson 39 y.o. male MRN: [id number]  Unit/Bed#: [facility] 345-02 Encounter: [id number]    ASSESSMENT & PLAN     Assessment & Plan  MDD (major depressive disorder), recurrent severe, without psychosis (HCC)  No medications changes at this time, will continue to monitor and optimize as indicated:  Abilify 5 mg daily as mood adjunct   Zoloft 150 mg daily for depression and anxiety    Continue to encourage participation in group therapy, milieu therapy and occupational therapy.  Continue to assess for side effects of medications.  Continue collaboration with [facility] for medical co-morbidities as indicated.  Continue discussion with CM/SW to assist with obtaining collateral, disposition planning, and the implementation of patient-centered individualized plan of care.  Continue frequent safety checks and vitals per unit protocol.    Risks, benefits and possible side effects of Medications: Risks, benefits, and possible side effects of medications have previously been explained. No new medications at this time.      Legal status: 201    Disposition: to be determined, pending SOAR application for potential group home placement. OT Cognitive Evaluation completed: \"Pt would benefit from discharge to a supportive environment that can provide checks for safety and compliance with IADLs. \"      No associated orders from this encounter found during lookback period of 72 hours.  Autism spectrum disorder  Continue supportive care    No associated orders from this encounter found during lookback period of 72 hours.      Treatment Recommendations/Precautions:  Continue to promote patient participation in therapeutic milieu.  Continue medical management per medicine.  Continue previously prescribed psychotropic medication regimen; see below.  Continue behavioral health checks q.15 minutes.   Continue vitals per behavioral health unit protocol.  Discharge date per primary " team    SUBJECTIVE     Patient evaluated this a.m. for continuity of care. Patient was discussed at length with nursing and treatment team. Per nursing, patient remains calm, cooperative, intermittently interactive in the milieu, adherent to his medications less any acute adverse effects. No acute distress is noted throughout evaluation. Franco Roberson denies suicidal/homicidal ideation in addition to thoughts of self-injury, receptive to crisis planning provided by this writer, contacting for safety in the inpatient setting, admitting to an ability to appropriately confide in staff including this writer.  Patient remains superficial on approach, scant, sparse, denying any/all psychiatric complaints/concerns    PSYCHIATRIC REVIEW OF SYSTEMS     Behavior over the last 24 hours:  unchanged  Sleep: adequate  Appetite: adequate  Medication side effects: No    REVIEW OF SYSTEMS   Review of systems: no complaints    OBJECTIVE     Vital Signs in Past 24 Hours:  Temp:  [96.7 °F (35.9 °C)-97.5 °F (36.4 °C)] 97.5 °F (36.4 °C)  HR:  [75-82] 75  BP: (122-140)/(63-73) 140/73  Resp:  [16-17] 16  SpO2:  [96 %-97 %] 97 %  O2 Device: None (Room air)    Intake/Output in Past 24 hours:  No intake/output data recorded.  No intake/output data recorded.        Laboratory Results:  I have personally reviewed all pertinent laboratory/tests results.  Most Recent Labs:   Lab Results   Component Value Date    WBC 7.01 06/27/2024    RBC 4.54 06/27/2024    HGB 14.5 06/27/2024    HCT 44.4 06/27/2024     06/27/2024    RDW 12.0 06/27/2024    NEUTROABS 4.01 06/27/2024    SODIUM 139 07/10/2024    K 3.8 07/10/2024     07/10/2024    CO2 27 07/10/2024    BUN 16 07/10/2024    CREATININE 0.92 07/10/2024    GLUC 87 07/10/2024    GLUF 87 07/10/2024    CALCIUM 9.3 07/10/2024    AST 28 07/10/2024    ALT 36 07/10/2024    ALKPHOS 66 07/10/2024    TP 7.7 07/10/2024    ALB 4.4 07/10/2024    TBILI 0.55 07/10/2024    CHOLESTEROL 168 09/19/2024    HDL 42  09/19/2024    TRIG 137 09/19/2024    LDLCALC 99 09/19/2024    NONHDLC 126 09/19/2024    YQT2HWNXUKVS 6.241 (H) 09/10/2024    FREET4 0.56 (L) 09/10/2024       Behavioral Health Medications: all current active meds have been reviewed and current meds:   Current Facility-Administered Medications:     acetaminophen (TYLENOL) tablet 650 mg, Q6H PRN    acetaminophen (TYLENOL) tablet 650 mg, Q4H PRN    acetaminophen (TYLENOL) tablet 975 mg, Q6H PRN    aluminum-magnesium hydroxide-simethicone (MAALOX) oral suspension 30 mL, Q4H PRN    ARIPiprazole (ABILIFY) tablet 5 mg, Daily    Artificial Tears ophthalmic solution 1 drop, Q3H PRN    atorvastatin (LIPITOR) tablet 10 mg, Daily With Dinner    benztropine (COGENTIN) injection 1 mg, Q4H PRN Max 6/day    benztropine (COGENTIN) tablet 1 mg, Q4H PRN Max 6/day    Cholecalciferol (VITAMIN D3) tablet 2,000 Units, Daily    cyanocobalamin (VITAMIN B-12) tablet 1,000 mcg, Daily    Diclofenac Sodium (VOLTAREN) 1 % topical gel 2 g, 4x Daily PRN    hydrOXYzine HCL (ATARAX) tablet 50 mg, Q6H PRN Max 4/day **OR** diphenhydrAMINE (BENADRYL) injection 50 mg, Q6H PRN    diphenhydrAMINE-zinc acetate (BENADRYL) 2-0.1 % cream, Daily PRN    famotidine (PEPCID) tablet 20 mg, BID    hydrOXYzine HCL (ATARAX) tablet 100 mg, Q6H PRN Max 4/day **OR** LORazepam (ATIVAN) injection 2 mg, Q6H PRN    hydrOXYzine HCL (ATARAX) tablet 25 mg, Q6H PRN Max 4/day    levothyroxine tablet 37.5 mcg, Early Morning    melatonin tablet 6 mg, HS PRN    methocarbamol (ROBAXIN) tablet 500 mg, Q6H PRN    OLANZapine (ZyPREXA) tablet 5 mg, Q4H PRN Max 3/day **OR** OLANZapine (ZyPREXA) IM injection 2.5 mg, Q4H PRN Max 3/day    OLANZapine (ZyPREXA) tablet 5 mg, Q3H PRN Max 3/day **OR** OLANZapine (ZyPREXA) IM injection 5 mg, Q3H PRN Max 3/day    OLANZapine (ZyPREXA) tablet 2.5 mg, Q4H PRN Max 6/day    polyethylene glycol (MIRALAX) packet 17 g, Daily PRN    propranolol (INDERAL) tablet 10 mg, Q8H PRN    senna-docusate sodium  (SENOKOT S) 8.6-50 mg per tablet 1 tablet, Daily PRN    sertraline (ZOLOFT) tablet 150 mg, Daily    traZODone (DESYREL) tablet 50 mg, HS PRN.    Risks, benefits and possible side effects of Medications:   Risks, benefits, and possible side effects of medications explained to patient and patient verbalizes understanding.      Mental Status Evaluation:  Appearance:  age appropriate, casually dressed, and disheveled   Behavior:  psychomotor retardation, calm, superficial   Speech:  normal pitch and normal volume   Mood:  euthymic   Affect:  mood-incongruent and restricted   Language sparse   Thought Process:  goal directed   Thought Content:  No overt obsessions or delusions   Perceptual Disturbances: None   Risk Potential: Suicidal Ideations none, Homicidal Ideations none, and Potential for Aggression No   Sensorium:  person, place, and time/date   Cognition:  recent and remote memory grossly intact   Consciousness:  alert and awake    Attention: attention span appeared shorter than expected for age   Insight:  limited   Judgment: limited   Intellect Not assessed   Gait/Station: normal gait/station and normal balance   Motor Activity: no abnormal movements     Memory: Short and long term memory  fair     Progress Toward Goals: unchanged, as evidenced by their participation (or lack thereof) in individual, social and therapeutic milieu in addition to adherence to their medication regimen.    Recommended Treatment:   See above for assessment and plan.    Inpatient Psychiatric Certification: Based upon physical, mental and social evaluations, I certify that inpatient psychiatric services are medically necessary for this patient for a duration of greater than 2 midnights for the treatment of MDD (major depressive disorder), recurrent severe, without psychosis (HCC) including psychotropic medication management, participation in the therapeutic milieu and referrals as indicated. Available alternative community resources do  not meet the patient's mental health care needs. I further attest that an established written individualized plan of care has been implemented and is outlined in the patient's medical records.    Counseling/Coordination of Care    I have expended greater than 15 minutes in which more than 50% of this time was expended in counseling/coordination of patient care relating to diagnostic results, prognosis, potential risks and benefits of management options, instructions for appropriate management, patient and/or collateral education, importance of adherence to management and/or risk factor reductions. Patient's rights, confidentiality, exceptions to confidentiality, use of electronic medical record including appropriate staff access to medical record regarding behavioral health services and consent to treatment were reviewed.    Note Share:     This note was not shared with the patient due to reasonable likelihood of causing patient harm     This note has been constructed using a voice recognition system. There may be translation, syntax,  or grammatical errors. If you have any questions, please contact the dictating provider.    Jordan Christopher Holter, DO 10/13/24

## 2024-10-13 NOTE — PLAN OF CARE
Problem: Ineffective Coping  Goal: Identifies ineffective coping skills  Outcome: Progressing  Goal: Identifies healthy coping skills  Outcome: Progressing  Goal: Demonstrates healthy coping skills  Outcome: Progressing  Goal: Participates in unit activities  Description: Interventions:  - Provide therapeutic environment   - Provide required programming   - Redirect inappropriate behaviors   Outcome: Progressing     Problem: Risk for Self Injury/Neglect  Goal: Treatment Goal: Remain safe during length of stay, learn and adopt new coping skills, and be free of self-injurious ideation, impulses and acts at the time of discharge  Outcome: Progressing     Problem: Depression  Goal: Treatment Goal: Demonstrate behavioral control of depressive symptoms, verbalize feelings of improved mood/affect, and adopt new coping skills prior to discharge  Outcome: Progressing  Goal: Verbalize thoughts and feelings  Description: Interventions:  - Assess and re-assess patient's level of risk   - Engage patient in 1:1 interactions, daily, for a minimum of 15 minutes   - Encourage patient to express feelings, fears, frustrations, hopes   Outcome: Progressing  Goal: Refrain from harming self  Description: Interventions:  - Monitor patient closely, per order   - Supervise medication ingestion, monitor effects and side effects   Outcome: Progressing  Goal: Refrain from isolation  Description: Interventions:  - Develop a trusting relationship   - Encourage socialization   Outcome: Progressing  Goal: Refrain from self-neglect  Outcome: Progressing  Goal: Attend and participate in unit activities, including therapeutic, recreational, and educational groups  Description: Interventions:  - Provide therapeutic and educational activities daily, encourage attendance and participation, and document same in the medical record   Outcome: Not Progressing  Goal: Complete daily ADLs, including personal hygiene independently, as able  Description:  Interventions:  - Observe, teach, and assist patient with ADLS  -  Monitor and promote a balance of rest/activity, with adequate nutrition and elimination   Outcome: Progressing     Problem: Ineffective Coping  Goal: Identifies ineffective coping skills  Outcome: Progressing  Goal: Identifies healthy coping skills  Outcome: Progressing  Goal: Demonstrates healthy coping skills  Outcome: Progressing  Goal: Participates in unit activities  Description: Interventions:  - Provide therapeutic environment   - Provide required programming   - Redirect inappropriate behaviors   Outcome: Progressing     Problem: Risk for Self Injury/Neglect  Goal: Treatment Goal: Remain safe during length of stay, learn and adopt new coping skills, and be free of self-injurious ideation, impulses and acts at the time of discharge  Outcome: Progressing     Problem: Depression  Goal: Treatment Goal: Demonstrate behavioral control of depressive symptoms, verbalize feelings of improved mood/affect, and adopt new coping skills prior to discharge  Outcome: Progressing  Goal: Verbalize thoughts and feelings  Description: Interventions:  - Assess and re-assess patient's level of risk   - Engage patient in 1:1 interactions, daily, for a minimum of 15 minutes   - Encourage patient to express feelings, fears, frustrations, hopes   Outcome: Progressing  Goal: Refrain from harming self  Description: Interventions:  - Monitor patient closely, per order   - Supervise medication ingestion, monitor effects and side effects   Outcome: Progressing  Goal: Refrain from isolation  Description: Interventions:  - Develop a trusting relationship   - Encourage socialization   Outcome: Progressing  Goal: Refrain from self-neglect  Outcome: Progressing  Goal: Attend and participate in unit activities, including therapeutic, recreational, and educational groups  Description: Interventions:  - Provide therapeutic and educational activities daily, encourage attendance and  participation, and document same in the medical record   Outcome: Not Progressing  Goal: Complete daily ADLs, including personal hygiene independently, as able  Description: Interventions:  - Observe, teach, and assist patient with ADLS  -  Monitor and promote a balance of rest/activity, with adequate nutrition and elimination   Outcome: Progressing     Problem: Anxiety  Goal: Anxiety is at manageable level  Description: Interventions:  - Assess and monitor patient's anxiety level.   - Monitor for signs and symptoms (heart palpitations, chest pain, shortness of breath, headaches, nausea, feeling jumpy, restlessness, irritable, apprehensive).   - Collaborate with interdisciplinary team and initiate plan and interventions as ordered.  - [place] patient to unit/surroundings  - Explain treatment plan  - Encourage participation in care  - Encourage verbalization of concerns/fears  - Identify coping mechanisms  - Assist in developing anxiety-reducing skills  - Administer/offer alternative therapies  - Limit or eliminate stimulants  Outcome: Progressing     Problem: SLEEP DISTURBANCE  Goal: Will exhibit normal sleeping pattern  Description: Interventions:  -  Assess the patients sleep pattern, noting recent changes  - Administer medication as ordered  - Decrease environmental stimuli, including noise, as appropriate during the night  - Encourage the patient to actively participate in unit groups and or exercise during the day to enhance ability to achieve adequate sleep at night  - Assess the patient, in the morning, encouraging a description of sleep experience  Outcome: Not Progressing

## 2024-10-14 PROCEDURE — 99232 SBSQ HOSP IP/OBS MODERATE 35: CPT | Performed by: PSYCHIATRY & NEUROLOGY

## 2024-10-14 RX ADMIN — ARIPIPRAZOLE 5 MG: 5 TABLET ORAL at 08:08

## 2024-10-14 RX ADMIN — ATORVASTATIN CALCIUM 10 MG: 10 TABLET, FILM COATED ORAL at 17:23

## 2024-10-14 RX ADMIN — SERTRALINE HYDROCHLORIDE 150 MG: 100 TABLET ORAL at 08:08

## 2024-10-14 RX ADMIN — FAMOTIDINE 20 MG: 20 TABLET ORAL at 17:23

## 2024-10-14 RX ADMIN — CHOLECALCIFEROL TAB 25 MCG (1000 UNIT) 2000 UNITS: 25 TAB at 08:08

## 2024-10-14 RX ADMIN — CYANOCOBALAMIN TAB 1000 MCG 1000 MCG: 1000 TAB at 08:09

## 2024-10-14 RX ADMIN — FAMOTIDINE 20 MG: 20 TABLET ORAL at 08:09

## 2024-10-14 RX ADMIN — LEVOTHYROXINE SODIUM 37.5 MCG: 125 TABLET ORAL at 06:24

## 2024-10-14 NOTE — PLAN OF CARE
Problem: Ineffective Coping  Goal: Identifies ineffective coping skills  Outcome: Progressing     Problem: Risk for Self Injury/Neglect  Goal: Treatment Goal: Remain safe during length of stay, learn and adopt new coping skills, and be free of self-injurious ideation, impulses and acts at the time of discharge  Outcome: Progressing     Problem: Depression  Goal: Treatment Goal: Demonstrate behavioral control of depressive symptoms, verbalize feelings of improved mood/affect, and adopt new coping skills prior to discharge  Outcome: Progressing  Goal: Verbalize thoughts and feelings  Description: Interventions:  - Assess and re-assess patient's level of risk   - Engage patient in 1:1 interactions, daily, for a minimum of 15 minutes   - Encourage patient to express feelings, fears, frustrations, hopes   Outcome: Progressing  Goal: Refrain from harming self  Description: Interventions:  - Monitor patient closely, per order   - Supervise medication ingestion, monitor effects and side effects   Outcome: Progressing  Goal: Refrain from self-neglect  Outcome: Progressing  Goal: Attend and participate in unit activities, including therapeutic, recreational, and educational groups  Description: Interventions:  - Provide therapeutic and educational activities daily, encourage attendance and participation, and document same in the medical record   Outcome: Progressing  Goal: Complete daily ADLs, including personal hygiene independently, as able  Description: Interventions:  - Observe, teach, and assist patient with ADLS  -  Monitor and promote a balance of rest/activity, with adequate nutrition and elimination   Outcome: Progressing     Problem: Anxiety  Goal: Anxiety is at manageable level  Description: Interventions:  - Assess and monitor patient's anxiety level.   - Monitor for signs and symptoms (heart palpitations, chest pain, shortness of breath, headaches, nausea, feeling jumpy, restlessness, irritable, apprehensive).    - Collaborate with interdisciplinary team and initiate plan and interventions as ordered.  - [place] patient to unit/surroundings  - Explain treatment plan  - Encourage participation in care  - Encourage verbalization of concerns/fears  - Identify coping mechanisms  - Assist in developing anxiety-reducing skills  - Administer/offer alternative therapies  - Limit or eliminate stimulants  Outcome: Progressing     Problem: Depression  Goal: Refrain from isolation  Description: Interventions:  - Develop a trusting relationship   - Encourage socialization   Outcome: Not Progressing

## 2024-10-14 NOTE — PROGRESS NOTES
"Progress Note - Behavioral Health   Name: Franco Roberson 39 y.o. male I MRN: [id number]  Unit/Bed#: -02 I Date of Admission: 5/14/2024   Date of Service: 10/14/2024 I Hospital Day: 153     Assessment & Plan  MDD (major depressive disorder), recurrent severe, without psychosis (HCC)  No medications changes at this time, will continue to monitor and optimize as indicated:  Abilify 5 mg daily as mood adjunct   Zoloft 150 mg daily for depression and anxiety    Continue to encourage participation in group therapy, milieu therapy and occupational therapy.  Continue to assess for side effects of medications.  Continue collaboration with SLIM for medical co-morbidities as indicated.  Continue discussion with CM/SW to assist with obtaining collateral, disposition planning, and the implementation of patient-centered individualized plan of care.  Continue frequent safety checks and vitals per unit protocol.    Risks, benefits and possible side effects of Medications: Risks, benefits, and possible side effects of medications have previously been explained. No new medications at this time.      Legal status: 201    Disposition: to be determined, pending SOAR application for potential group home placement. OT Cognitive Evaluation completed: \"Pt would benefit from discharge to a supportive environment that can provide checks for safety and compliance with IADLs. \"      No associated orders from this encounter found during lookback period of 72 hours.  Autism spectrum disorder  Continue supportive care    No associated orders from this encounter found during lookback period of 72 hours.      Current medications:  Current Facility-Administered Medications   Medication Dose Route Frequency Provider Last Rate    acetaminophen  650 mg Oral Q6H PRN Basilio Brown MD      acetaminophen  650 mg Oral Q4H PRN Basilio Brown MD      acetaminophen  975 mg Oral Q6H PRN Basilio Brown MD      aluminum-magnesium " hydroxide-simethicone  30 mL Oral Q4H PRN Basilio Brown MD      ARIPiprazole  5 mg Oral Daily Basilio Panda MD      Artificial Tears  1 drop Both Eyes Q3H PRN Basilio Brown MD      atorvastatin  10 mg Oral Daily With Dinner WALLY Baptiste      benztropine  1 mg Intramuscular Q4H PRN Max 6/day Basilio Brown MD      benztropine  1 mg Oral Q4H PRN Max 6/day Basilio Brown MD      Cholecalciferol  2,000 Units Oral Daily WALLY Baptiste      cyanocobalamin  1,000 mcg Oral Daily WALLY Baptiste      Diclofenac Sodium  2 g Topical 4x Daily PRN WALLY Baptiste      hydrOXYzine HCL  50 mg Oral Q6H PRN Max 4/day Basilio Brown MD      Or    diphenhydrAMINE  50 mg Intramuscular Q6H PRN Basilio Brown MD      diphenhydrAMINE-zinc acetate   Topical Daily PRN Raj Guerrero MD      famotidine  20 mg Oral BID WALLY Baptiste      hydrOXYzine HCL  100 mg Oral Q6H PRN Max 4/day Basilio Brown MD      Or    LORazepam  2 mg Intramuscular Q6H PRN Basilio Brown MD      hydrOXYzine HCL  25 mg Oral Q6H PRN Max 4/day Basilio Brown MD      levothyroxine  37.5 mcg Oral Early Morning WALLY Baptiste      melatonin  6 mg Oral HS PRN WALLY Gomez      methocarbamol  500 mg Oral Q6H PRN WALLY Baptiste      OLANZapine  5 mg Oral Q4H PRN Max 3/day Basilio Brown MD      Or    OLANZapine  2.5 mg Intramuscular Q4H PRN Max 3/day Basilio Brown MD      OLANZapine  5 mg Oral Q3H PRN Max 3/day Basilio Brown MD      Or    OLANZapine  5 mg Intramuscular Q3H PRN Max 3/day Basilio Brown MD      OLANZapine  2.5 mg Oral Q4H PRN Max 6/day Basilio Brown MD      polyethylene glycol  17 g Oral Daily PRN Basilio Brown MD      propranolol  10 mg Oral Q8H PRN Basilio Brown MD      senna-docusate sodium  1 tablet Oral Daily PRN Basilio Brown MD      sertraline   150 mg Oral Daily WALLY Gomez      traZODone  50 mg Oral HS PRN Basilio Brown MD         Risks / Benefits of Treatment:    Risks, benefits, and possible side effects of medications explained to patient and patient verbalizes understanding and agreement for treatment.    Subjective:    Behavior over the last 24 hours: unchanged.     The patient was evaluated this morning for continuity of care and no acute distress noted throughout the evaluation.  Over the past 24 hours staff noted the patient was calm and cooperative throughout the weekend, no issues or concerns.      Patient was visited on unit for continuing care; chart reviewed and discussed with multidisciplinary treatment team.  On approach, the patient was calm and cooperative.  Franco mentioned having some anxiety about discharge planning, but is hopeful to keep working with  and determine safe placement Denied any changes in mood, appetite, and energy level. No problem initiating and maintaining sleep.  Denied A/VH currently.  Denied SI/HI, intent or plan upon direct inquiry at this time.    Patient continues to be selectively interactive with staff and peers. No reports of aggression or self-injurious behavior on unit. No PRN medications used in the past 24 hours.    Patient accepted all offered medications and no adverse effects of medications noted or reported.    Sleep: normal  Appetite: normal  Medication side effects: No   ROS: no complaints      Mental Status Examination:  Appearance:  age appropriate, casually dressed, looks stated age   Behavior:  pleasant, cooperative, calm   Speech:  normal rate and volume   Mood:  euthymic   Affect:  constricted   Thought Process:  logical, coherent, linear   Associations: concrete associations   Thought Content:  no overt delusions   Perceptual Disturbances: no auditory hallucinations, no visual hallucinations, does not appear responding to internal stimuli   Risk Potential:  Suicidal ideation - None at present  Homicidal ideation - None at present  Potential for aggression - No   Sensorium:  oriented to person, place, and time/date   Memory:  recent and remote memory grossly intact   Consciousness:  alert and awake   Attention/Concentration: attention span and concentration are age appropriate   Insight:  limited   Judgment: limited   Gait/Station: normal gait/station   Motor Activity: no abnormal movements       Vital signs in last 24 hours:    Temp:  [96.8 °F (36 °C)-97.6 °F (36.4 °C)] 96.8 °F (36 °C)  HR:  [75-78] 78  BP: (109-137)/(71-73) 137/73  Resp:  [16] 16  SpO2:  [96 %-98 %] 96 %  O2 Device: None (Room air)         Laboratory results: I have personally reviewed all pertinent laboratory/tests results    Most Recent Labs:   Lab Results   Component Value Date    WBC 7.01 06/27/2024    RBC 4.54 06/27/2024    HGB 14.5 06/27/2024    HCT 44.4 06/27/2024     06/27/2024    RDW 12.0 06/27/2024    NEUTROABS 4.01 06/27/2024    SODIUM 139 07/10/2024    K 3.8 07/10/2024     07/10/2024    CO2 27 07/10/2024    BUN 16 07/10/2024    CREATININE 0.92 07/10/2024    GLUC 87 07/10/2024    CALCIUM 9.3 07/10/2024    AST 28 07/10/2024    ALT 36 07/10/2024    ALKPHOS 66 07/10/2024    TP 7.7 07/10/2024    ALB 4.4 07/10/2024    TBILI 0.55 07/10/2024    CHOLESTEROL 168 09/19/2024    HDL 42 09/19/2024    TRIG 137 09/19/2024    LDLCALC 99 09/19/2024    NONHDLC 126 09/19/2024    RUN1RXTBRCSN 6.241 (H) 09/10/2024    FREET4 0.56 (L) 09/10/2024    SYPHILISAB Non-reactive 05/15/2024       Progress Toward Goals: Franco is currently assessed as being at their baseline with continued need for medication management, supervision for safety and ADL’s. These services are not currently available in a less restrictive environment necessitating continued hospital stay.  Franco should remain on the unit until these services are available, due to likelihood of mental decompensation and readmission if discharged  to an unsupervised setting.  Assertive discharge planning and collaboration with multiple providers (inpatient and community based) remains ongoing.  Franco is currently awaiting for group home versus safe [place].      Counseling / Coordination of Care:    Administrative Statements   I have spent a total time of 25 minutes in caring for this patient on the day of the visit/encounter including Counseling / Coordination of care, Documenting in the medical record, Obtaining or reviewing history  , and Communicating with other healthcare professionals .    Lita Knapp,  10/14/24    This note was completed in part utilizing Dragon dictation Software. Grammatical, translation, syntax errors, random word insertions, spelling mistakes, and incomplete sentences may be an occasional consequence of this system secondary to software limitations with voice recognition, ambient noise, and hardware issues. If you have any questions or concerns about the content, text, or information contained within the body of this dictation, please contact the provider for clarification.

## 2024-10-14 NOTE — PROGRESS NOTES
10/14/24 0839   Team Meeting   Meeting Type Daily Rounds   Team Members Present   Team Members Present Physician;;Nurse   Physician Team Member Aria   Nursing Team Member [facility] Management Team Member Noa   Patient/Family Present   Patient Present No   Patient's Family Present No     Pt denies SI/HI/AVH. Pt is medication and meal compliant. Pt is open to other options for placement. Pt reported feeling overwhelmed with the thought of discharge and not getting approved for the social security. Pt's discharge is pending placement.

## 2024-10-14 NOTE — NURSING NOTE
Intermittently about the unit. Doing laps throughout the halls at times. Denies symptoms. Cooperative. Compliant with medications. Waiting placement.

## 2024-10-15 PROCEDURE — 99232 SBSQ HOSP IP/OBS MODERATE 35: CPT | Performed by: PSYCHIATRY & NEUROLOGY

## 2024-10-15 RX ADMIN — SERTRALINE HYDROCHLORIDE 150 MG: 100 TABLET ORAL at 08:19

## 2024-10-15 RX ADMIN — FAMOTIDINE 20 MG: 20 TABLET ORAL at 08:19

## 2024-10-15 RX ADMIN — CYANOCOBALAMIN TAB 1000 MCG 1000 MCG: 1000 TAB at 08:19

## 2024-10-15 RX ADMIN — ARIPIPRAZOLE 5 MG: 5 TABLET ORAL at 08:19

## 2024-10-15 RX ADMIN — LEVOTHYROXINE SODIUM 37.5 MCG: 125 TABLET ORAL at 06:06

## 2024-10-15 RX ADMIN — ATORVASTATIN CALCIUM 10 MG: 10 TABLET, FILM COATED ORAL at 17:46

## 2024-10-15 RX ADMIN — FAMOTIDINE 20 MG: 20 TABLET ORAL at 17:46

## 2024-10-15 RX ADMIN — CHOLECALCIFEROL TAB 25 MCG (1000 UNIT) 2000 UNITS: 25 TAB at 08:19

## 2024-10-15 NOTE — PROGRESS NOTES
10/15/24 0833   Team Meeting   Meeting Type Daily Rounds   Team Members Present   Team Members Present Physician;Nurse;   Physician Team Member Aria   Nursing Team Member [facility]   Care Management Team Member Noa   Patient/Family Present   Patient Present No   Patient's Family Present No     Pt denies SI/HI/AVH, Pt is medication and meal compliant. Pt is seclusive to his room. Pt's discharge is pending placement.

## 2024-10-15 NOTE — NURSING NOTE
Pleasant, calm and cooperative. Denies symptoms. Compliant with medications. About the unit at times and attending some groups. Waiting placement.

## 2024-10-15 NOTE — PLAN OF CARE
Problem: Ineffective Coping  Goal: Identifies ineffective coping skills  Outcome: Progressing  Goal: Identifies healthy coping skills  Outcome: Progressing  Goal: Demonstrates healthy coping skills  Outcome: Progressing  Goal: Participates in unit activities  Description: Interventions:  - Provide therapeutic environment   - Provide required programming   - Redirect inappropriate behaviors   Outcome: Progressing     Problem: Risk for Self Injury/Neglect  Goal: Treatment Goal: Remain safe during length of stay, learn and adopt new coping skills, and be free of self-injurious ideation, impulses and acts at the time of discharge  Outcome: Progressing     Problem: Depression  Goal: Treatment Goal: Demonstrate behavioral control of depressive symptoms, verbalize feelings of improved mood/affect, and adopt new coping skills prior to discharge  Outcome: Progressing  Goal: Verbalize thoughts and feelings  Description: Interventions:  - Assess and re-assess patient's level of risk   - Engage patient in 1:1 interactions, daily, for a minimum of 15 minutes   - Encourage patient to express feelings, fears, frustrations, hopes   Outcome: Progressing  Goal: Refrain from harming self  Description: Interventions:  - Monitor patient closely, per order   - Supervise medication ingestion, monitor effects and side effects   Outcome: Progressing  Goal: Refrain from isolation  Description: Interventions:  - Develop a trusting relationship   - Encourage socialization   Outcome: Progressing  Goal: Refrain from self-neglect  Outcome: Progressing  Goal: Complete daily ADLs, including personal hygiene independently, as able  Description: Interventions:  - Observe, teach, and assist patient with ADLS  -  Monitor and promote a balance of rest/activity, with adequate nutrition and elimination   Outcome: Progressing     Problem: Anxiety  Goal: Anxiety is at manageable level  Description: Interventions:  - Assess and monitor patient's anxiety  level.   - Monitor for signs and symptoms (heart palpitations, chest pain, shortness of breath, headaches, nausea, feeling jumpy, restlessness, irritable, apprehensive).   - Collaborate with interdisciplinary team and initiate plan and interventions as ordered.  - [place] patient to unit/surroundings  - Explain treatment plan  - Encourage participation in care  - Encourage verbalization of concerns/fears  - Identify coping mechanisms  - Assist in developing anxiety-reducing skills  - Administer/offer alternative therapies  - Limit or eliminate stimulants  Outcome: Progressing     Problem: Knowledge Deficit  Goal: Patient/family/caregiver demonstrates understanding of disease process, treatment plan, medications, and discharge instructions  Description: Complete learning assessment and assess knowledge base.  Interventions:  - Provide teaching at level of understanding  - Provide teaching via preferred learning methods  Outcome: Progressing     Problem: SLEEP DISTURBANCE  Goal: Will exhibit normal sleeping pattern  Description: Interventions:  -  Assess the patients sleep pattern, noting recent changes  - Administer medication as ordered  - Decrease environmental stimuli, including noise, as appropriate during the night  - Encourage the patient to actively participate in unit groups and or exercise during the day to enhance ability to achieve adequate sleep at night  - Assess the patient, in the morning, encouraging a description of sleep experience  Outcome: Progressing

## 2024-10-15 NOTE — NURSING NOTE
"Pt returned from meeting with CM at 1645, in good spirits,  stated, \"We're making some progress...\" Pt denies HI/SI/AVH,  calm cooperative, and pleasant during interaction.  Pt dressed in personal attire,  compliant with scheduled meds,  No unmet needs reported at this time.    "

## 2024-10-15 NOTE — PLAN OF CARE
Pt attends group therapy regularly during the week and participates appropriately. Pt typically does not attend groups on the weekend.

## 2024-10-15 NOTE — PROGRESS NOTES
"Progress Note - Behavioral Health   Name: Franco Roberson 39 y.o. male I MRN: [id number]  Unit/Bed#: -02 I Date of Admission: 5/14/2024   Date of Service: 10/15/2024 I Hospital Day: 154     Assessment & Plan  MDD (major depressive disorder), recurrent severe, without psychosis (HCC)  No medications changes at this time, will continue to monitor and optimize as indicated:  Abilify 5 mg daily as mood adjunct   Zoloft 150 mg daily for depression and anxiety    Continue to encourage participation in group therapy, milieu therapy and occupational therapy.  Continue to assess for side effects of medications.  Continue collaboration with SLIM for medical co-morbidities as indicated.  Continue discussion with CM/SW to assist with obtaining collateral, disposition planning, and the implementation of patient-centered individualized plan of care.  Continue frequent safety checks and vitals per unit protocol.    Risks, benefits and possible side effects of Medications: Risks, benefits, and possible side effects of medications have previously been explained. No new medications at this time.      Legal status: 201    Disposition: to be determined, pending SOAR application for potential group home placement. OT Cognitive Evaluation completed: \"Pt would benefit from discharge to a supportive environment that can provide checks for safety and compliance with IADLs. \"      No associated orders from this encounter found during lookback period of 72 hours.  Autism spectrum disorder  Continue supportive care    No associated orders from this encounter found during lookback period of 72 hours.      Current medications:  Current Facility-Administered Medications   Medication Dose Route Frequency Provider Last Rate    acetaminophen  650 mg Oral Q6H PRN Basilio Brown MD      acetaminophen  650 mg Oral Q4H PRN Basilio Brown MD      acetaminophen  975 mg Oral Q6H PRN Basilio Brown MD      aluminum-magnesium " hydroxide-simethicone  30 mL Oral Q4H PRN Basilio Brown MD      ARIPiprazole  5 mg Oral Daily Basilio Panda MD      Artificial Tears  1 drop Both Eyes Q3H PRN Basilio Brown MD      atorvastatin  10 mg Oral Daily With Dinner WALLY Baptiste      benztropine  1 mg Intramuscular Q4H PRN Max 6/day Basilio Brown MD      benztropine  1 mg Oral Q4H PRN Max 6/day Basilio Brown MD      Cholecalciferol  2,000 Units Oral Daily WALLY Baptiste      cyanocobalamin  1,000 mcg Oral Daily WALLY Baptiste      Diclofenac Sodium  2 g Topical 4x Daily PRN WALLY Baptiste      hydrOXYzine HCL  50 mg Oral Q6H PRN Max 4/day Basilio Brown MD      Or    diphenhydrAMINE  50 mg Intramuscular Q6H PRN Basilio Brown MD      diphenhydrAMINE-zinc acetate   Topical Daily PRN Raj Guerrero MD      famotidine  20 mg Oral BID WALLY Baptiste      hydrOXYzine HCL  100 mg Oral Q6H PRN Max 4/day Basilio Brown MD      Or    LORazepam  2 mg Intramuscular Q6H PRN Basilio Brown MD      hydrOXYzine HCL  25 mg Oral Q6H PRN Max 4/day Basilio Brown MD      levothyroxine  37.5 mcg Oral Early Morning WALLY Baptiste      melatonin  6 mg Oral HS PRN WALLY Gomez      methocarbamol  500 mg Oral Q6H PRN WALLY Baptiste      OLANZapine  5 mg Oral Q4H PRN Max 3/day Basilio Brown MD      Or    OLANZapine  2.5 mg Intramuscular Q4H PRN Max 3/day Basilio Brown MD      OLANZapine  5 mg Oral Q3H PRN Max 3/day Basilio Brown MD      Or    OLANZapine  5 mg Intramuscular Q3H PRN Max 3/day Basilio Brown MD      OLANZapine  2.5 mg Oral Q4H PRN Max 6/day Basilio Brown MD      polyethylene glycol  17 g Oral Daily PRN Basilio Brown MD      propranolol  10 mg Oral Q8H PRN Basilio Brown MD      senna-docusate sodium  1 tablet Oral Daily PRN Basilio Brown MD      sertraline  " 150 mg Oral Daily WALLY Gomez      traZODone  50 mg Oral HS PRN Basilio Brown MD         Risks / Benefits of Treatment:    Risks, benefits, and possible side effects of medications explained to patient and patient verbalizes understanding and agreement for treatment.    Subjective:    Behavior over the last 24 hours: unchanged.     The patient was evaluated this morning for continuity of care and no acute distress noted throughout the evaluation.  Over the past 24 hours staff noted the patient was calm, cooperative, denying all symptoms..      Patient was visited on unit for continuing care; chart reviewed and discussed with multidisciplinary treatment team.  On approach, the patient was calm, pleasant and cooperative.  rFanco reports some anxiety related to his discharge planning, no other symptoms.  Denied any changes in mood, appetite, and energy level. No problem initiating and maintaining sleep.  Denied A/VH currently.  Denied SI/HI, intent or plan upon direct inquiry at this time.    Patient continues to be selectively interactive with staff and peers. No reports of aggression or self-injurious behavior on unit. No PRN medications used in the past 24 hours.    Patient accepted all offered medications and no adverse effects of medications noted or reported.    Sleep: normal  Appetite: normal  Medication side effects: No   ROS: no complaints    Mental Status Examination:  Appearance:  age appropriate, casually dressed, looks stated age   Behavior:  pleasant, cooperative, calm   Speech:  normal rate and volume   Mood:  \"Okay \"   Affect:  constricted   Thought Process:  logical, coherent, linear   Associations: concrete associations   Thought Content:  no overt delusions   Perceptual Disturbances: no auditory hallucinations, no visual hallucinations, does not appear responding to internal stimuli   Risk Potential: Suicidal ideation - None at present  Homicidal ideation - None at " present  Potential for aggression - No   Sensorium:  oriented to person, place, and time/date   Memory:  recent and remote memory grossly intact   Consciousness:  alert and awake   Attention/Concentration: attention span and concentration are age appropriate   Insight:  limited   Judgment: limited   Gait/Station: normal gait/station   Motor Activity: no abnormal movements       Vital signs in last 24 hours:    Temp:  [96.8 °F (36 °C)] 96.8 °F (36 °C)  HR:  [66-78] 66  BP: (117-137)/(67-73) 117/67  Resp:  [16] 16  SpO2:  [96 %-97 %] 97 %  O2 Device: None (Room air)         Laboratory results: I have personally reviewed all pertinent laboratory/tests results    Most Recent Labs:   Lab Results   Component Value Date    WBC 7.01 06/27/2024    RBC 4.54 06/27/2024    HGB 14.5 06/27/2024    HCT 44.4 06/27/2024     06/27/2024    RDW 12.0 06/27/2024    NEUTROABS 4.01 06/27/2024    SODIUM 139 07/10/2024    K 3.8 07/10/2024     07/10/2024    CO2 27 07/10/2024    BUN 16 07/10/2024    CREATININE 0.92 07/10/2024    GLUC 87 07/10/2024    CALCIUM 9.3 07/10/2024    AST 28 07/10/2024    ALT 36 07/10/2024    ALKPHOS 66 07/10/2024    TP 7.7 07/10/2024    ALB 4.4 07/10/2024    TBILI 0.55 07/10/2024    CHOLESTEROL 168 09/19/2024    HDL 42 09/19/2024    TRIG 137 09/19/2024    LDLCALC 99 09/19/2024    NONHDLC 126 09/19/2024    DOS8TYESGOMG 6.241 (H) 09/10/2024    FREET4 0.56 (L) 09/10/2024    SYPHILISAB Non-reactive 05/15/2024       Progress Toward Goals: Franco is currently assessed as being at their baseline with continued need for medication management, supervision for safety and ADL’s. These services are not currently available in a less restrictive environment necessitating continued hospital stay.  Franco should remain on the unit until these services are available, due to likelihood of mental decompensation and readmission if discharged to an unsupervised setting.  Assertive discharge planning and collaboration with  multiple providers (inpatient and community based) remains ongoing.  Franco is currently awaiting for private group home versus safe [place] program.      Counseling / Coordination of Care:    Administrative Statements   I have spent a total time of 20 minutes in caring for this patient on the day of the visit/encounter including Counseling / Coordination of care, Documenting in the medical record, and Obtaining or reviewing history  .    Lita Knapp,  10/15/24    This note was completed in part utilizing Dragon dictation Software. Grammatical, translation, syntax errors, random word insertions, spelling mistakes, and incomplete sentences may be an occasional consequence of this system secondary to software limitations with voice recognition, ambient noise, and hardware issues. If you have any questions or concerns about the content, text, or information contained within the body of this dictation, please contact the provider for clarification.

## 2024-10-15 NOTE — CASE MANAGEMENT
Pt has a scheduled phone appt with [employer] on Nov 1 at 11am to apply for SSD.   Writer and pt have been attempting to setup an online account to apply online, which is faster, and pt's online account keeps getting rejecting. Per [employer] staff- something in the online account information being input does not match his information at [facility].  Writer and pt will request pt's ID from Security to ensure info is matching up.   Pt also needs his debit card and would like to verify the dollar amount in his account at present.

## 2024-10-15 NOTE — NURSING NOTE
Pt visible, social with select peers.  Pt appears disheveled, compliant with meds and meals.  Pt denies HI/SI/AVH, no unmet need reported at this time.

## 2024-10-16 PROCEDURE — 99232 SBSQ HOSP IP/OBS MODERATE 35: CPT | Performed by: PSYCHIATRY & NEUROLOGY

## 2024-10-16 RX ADMIN — ARIPIPRAZOLE 5 MG: 5 TABLET ORAL at 08:21

## 2024-10-16 RX ADMIN — FAMOTIDINE 20 MG: 20 TABLET ORAL at 08:21

## 2024-10-16 RX ADMIN — FAMOTIDINE 20 MG: 20 TABLET ORAL at 17:25

## 2024-10-16 RX ADMIN — ATORVASTATIN CALCIUM 10 MG: 10 TABLET, FILM COATED ORAL at 17:25

## 2024-10-16 RX ADMIN — CYANOCOBALAMIN TAB 1000 MCG 1000 MCG: 1000 TAB at 08:21

## 2024-10-16 RX ADMIN — SERTRALINE HYDROCHLORIDE 150 MG: 100 TABLET ORAL at 08:21

## 2024-10-16 RX ADMIN — LEVOTHYROXINE SODIUM 37.5 MCG: 125 TABLET ORAL at 06:24

## 2024-10-16 RX ADMIN — CHOLECALCIFEROL TAB 25 MCG (1000 UNIT) 2000 UNITS: 25 TAB at 08:21

## 2024-10-16 NOTE — PROGRESS NOTES
"Progress Note - Behavioral Health   Name: Franco Roberson 39 y.o. male I MRN: [id number]  Unit/Bed#: -02 I Date of Admission: 5/14/2024   Date of Service: 10/16/2024 I Hospital Day: 155     Assessment & Plan  MDD (major depressive disorder), recurrent severe, without psychosis (HCC)  No medications changes at this time, will continue to monitor and optimize as indicated:  Abilify 5 mg daily as mood adjunct   Zoloft 150 mg daily for depression and anxiety    Continue to encourage participation in group therapy, milieu therapy and occupational therapy.  Continue to assess for side effects of medications.  Continue collaboration with [facility] for medical co-morbidities as indicated.  Continue discussion with CM/SW to assist with obtaining collateral, disposition planning, and the implementation of patient-centered individualized plan of care.  Continue frequent safety checks and vitals per unit protocol.    Risks, benefits and possible side effects of Medications: Risks, benefits, and possible side effects of medications have previously been explained. No new medications at this time.      Legal status: 201    Disposition: to be determined, pending SOAR application for potential group home placement. OT Cognitive Evaluation completed: \"Pt would benefit from discharge to a supportive environment that can provide checks for safety and compliance with IADLs. \"      No associated orders from this encounter found during lookback period of 72 hours.  Autism spectrum disorder  Continue supportive care    No associated orders from this encounter found during lookback period of 72 hours.      Current medications:  Current Facility-Administered Medications   Medication Dose Route Frequency Provider Last Rate    acetaminophen  650 mg Oral Q6H PRN Basilio Brown MD      acetaminophen  650 mg Oral Q4H PRN Basilio Brown MD      acetaminophen  975 mg Oral Q6H PRN Basilio Brown MD      aluminum-magnesium " hydroxide-simethicone  30 mL Oral Q4H PRN Basilio Brown MD      ARIPiprazole  5 mg Oral Daily Basilio Panda MD      Artificial Tears  1 drop Both Eyes Q3H PRN Basilio Brown MD      atorvastatin  10 mg Oral Daily With Dinner WALLY Baptiste      benztropine  1 mg Intramuscular Q4H PRN Max 6/day Basilio Brown MD      benztropine  1 mg Oral Q4H PRN Max 6/day Basilio Brown MD      Cholecalciferol  2,000 Units Oral Daily WALLY Baptiste      cyanocobalamin  1,000 mcg Oral Daily WALLY Baptiste      Diclofenac Sodium  2 g Topical 4x Daily PRN WALLY Baptiste      hydrOXYzine HCL  50 mg Oral Q6H PRN Max 4/day Basilio Brown MD      Or    diphenhydrAMINE  50 mg Intramuscular Q6H PRN Basilio Brown MD      diphenhydrAMINE-zinc acetate   Topical Daily PRN Raj Guerrero MD      famotidine  20 mg Oral BID WALLY Baptiste      hydrOXYzine HCL  100 mg Oral Q6H PRN Max 4/day Basilio Brown MD      Or    LORazepam  2 mg Intramuscular Q6H PRN Basilio Brown MD      hydrOXYzine HCL  25 mg Oral Q6H PRN Max 4/day Basilio Brown MD      levothyroxine  37.5 mcg Oral Early Morning WALLY Baptiste      melatonin  6 mg Oral HS PRN WALLY Gomez      methocarbamol  500 mg Oral Q6H PRN WALLY Baptiste      OLANZapine  5 mg Oral Q4H PRN Max 3/day Basilio Brown MD      Or    OLANZapine  2.5 mg Intramuscular Q4H PRN Max 3/day Basilio Brown MD      OLANZapine  5 mg Oral Q3H PRN Max 3/day Basilio Brown MD      Or    OLANZapine  5 mg Intramuscular Q3H PRN Max 3/day Basilio Brown MD      OLANZapine  2.5 mg Oral Q4H PRN Max 6/day Basilio Brown MD      polyethylene glycol  17 g Oral Daily PRN Basilio Brown MD      propranolol  10 mg Oral Q8H PRN Basilio Brown MD      senna-docusate sodium  1 tablet Oral Daily PRN Basilio Brown MD      sertraline  " 150 mg Oral Daily WALLY Gomez      traZODone  50 mg Oral HS PRN Basilio Brown MD         Risks / Benefits of Treatment:    Risks, benefits, and possible side effects of medications explained to patient and patient verbalizes understanding and agreement for treatment.    Subjective:    Behavior over the last 24 hours: unchanged.     The patient was evaluated this morning for continuity of care and no acute distress noted throughout the evaluation.  Over the past 24 hours staff noted the patient was per nursing staff patient has remained calm and compliant, but her  he is still working on social security application.      Patient was visited on unit for continuing care; chart reviewed and discussed with multidisciplinary treatment team.  On approach, the patient was calm and cooperative.  Franco states that his mood is \"pretty good,\" he does admit some anxiety related to \"roadblocks.\"  Franco states that he has not been able to set up an online account for Social Security and his  scheduled a phone meeting with [employer] on November 1.  Denied any changes in mood, appetite, and energy level. No problem initiating and maintaining sleep.  Denied A/VH currently.  Denied SI/HI, intent or plan upon direct inquiry at this time.    Patient continues to be visible in the milieu and interacts with staff and peers. No reports of aggression or self-injurious behavior on unit. No PRN medications used in the past 24 hours.    Patient accepted all offered medications and no adverse effects of medications noted or reported.    Sleep: normal  Appetite: normal  Medication side effects: No   ROS: no complaints    Mental Status Examination:  Appearance:  age appropriate, casually dressed, looks stated age   Behavior:  pleasant, cooperative, calm   Speech:  normal rate and volume   Mood:  \"Pretty good   Affect:  constricted   Thought Process:  organized, logical, linear   Associations: " concrete associations   Thought Content:  no overt delusions   Perceptual Disturbances: no auditory hallucinations, no visual hallucinations, does not appear responding to internal stimuli   Risk Potential: Suicidal ideation - None at present  Homicidal ideation - None at present  Potential for aggression - No   Sensorium:  oriented to person, place, and time/date   Memory:  recent and remote memory grossly intact   Consciousness:  alert and awake   Attention/Concentration: attention span and concentration are age appropriate   Insight:  limited   Judgment: limited   Gait/Station: normal gait/station   Motor Activity: no abnormal movements       Vital signs in last 24 hours:    Temp:  [96.7 °F (35.9 °C)-97.4 °F (36.3 °C)] 97.4 °F (36.3 °C)  HR:  [71-73] 73  BP: (109-119)/(62-73) 115/62  Resp:  [16] 16  SpO2:  [95 %-97 %] 97 %  O2 Device: None (Room air)         Laboratory results: I have personally reviewed all pertinent laboratory/tests results    Most Recent Labs:   Lab Results   Component Value Date    WBC 7.01 06/27/2024    RBC 4.54 06/27/2024    HGB 14.5 06/27/2024    HCT 44.4 06/27/2024     06/27/2024    RDW 12.0 06/27/2024    NEUTROABS 4.01 06/27/2024    SODIUM 139 07/10/2024    K 3.8 07/10/2024     07/10/2024    CO2 27 07/10/2024    BUN 16 07/10/2024    CREATININE 0.92 07/10/2024    GLUC 87 07/10/2024    CALCIUM 9.3 07/10/2024    AST 28 07/10/2024    ALT 36 07/10/2024    ALKPHOS 66 07/10/2024    TP 7.7 07/10/2024    ALB 4.4 07/10/2024    TBILI 0.55 07/10/2024    CHOLESTEROL 168 09/19/2024    HDL 42 09/19/2024    TRIG 137 09/19/2024    LDLCALC 99 09/19/2024    NONHDLC 126 09/19/2024    XXK8LKGQPDXR 6.241 (H) 09/10/2024    FREET4 0.56 (L) 09/10/2024    SYPHILISAB Non-reactive 05/15/2024       Progress Toward Goals: Franco is currently assessed as being at their baseline with continued need for medication management, supervision for safety and ADL’s. These services are not currently available in a  less restrictive environment necessitating continued hospital stay.  Franco should remain on the unit until these services are available, due to likelihood of mental decompensation and readmission if discharged to an unsupervised setting.  Assertive discharge planning and collaboration with multiple providers (inpatient and community based) remains ongoing.  Franco is currently awaiting for private pay group home versus safe [place] program.    Counseling / Coordination of Care:    Administrative Statements   I have spent a total time of 20 minutes in caring for this patient on the day of the visit/encounter including Counseling / Coordination of care, Documenting in the medical record, and Obtaining or reviewing history  .    Lita Knapp,  10/16/24    This note was completed in part utilizing Dragon dictation Software. Grammatical, translation, syntax errors, random word insertions, spelling mistakes, and incomplete sentences may be an occasional consequence of this system secondary to software limitations with voice recognition, ambient noise, and hardware issues. If you have any questions or concerns about the content, text, or information contained within the body of this dictation, please contact the provider for clarification.

## 2024-10-16 NOTE — PLAN OF CARE
Problem: Ineffective Coping  Goal: Identifies ineffective coping skills  Outcome: Progressing  Goal: Identifies healthy coping skills  Outcome: Progressing  Goal: Demonstrates healthy coping skills  Outcome: Progressing  Goal: Participates in unit activities  Description: Interventions:  - Provide therapeutic environment   - Provide required programming   - Redirect inappropriate behaviors   Outcome: Progressing     Problem: Risk for Self Injury/Neglect  Goal: Treatment Goal: Remain safe during length of stay, learn and adopt new coping skills, and be free of self-injurious ideation, impulses and acts at the time of discharge  Outcome: Progressing     Problem: Depression  Goal: Treatment Goal: Demonstrate behavioral control of depressive symptoms, verbalize feelings of improved mood/affect, and adopt new coping skills prior to discharge  Outcome: Progressing  Goal: Verbalize thoughts and feelings  Description: Interventions:  - Assess and re-assess patient's level of risk   - Engage patient in 1:1 interactions, daily, for a minimum of 15 minutes   - Encourage patient to express feelings, fears, frustrations, hopes   Outcome: Progressing  Goal: Refrain from harming self  Description: Interventions:  - Monitor patient closely, per order   - Supervise medication ingestion, monitor effects and side effects   Outcome: Progressing  Goal: Refrain from isolation  Description: Interventions:  - Develop a trusting relationship   - Encourage socialization   Outcome: Progressing  Goal: Refrain from self-neglect  Outcome: Progressing  Goal: Complete daily ADLs, including personal hygiene independently, as able  Description: Interventions:  - Observe, teach, and assist patient with ADLS  -  Monitor and promote a balance of rest/activity, with adequate nutrition and elimination   Outcome: Progressing     Problem: Anxiety  Goal: Anxiety is at manageable level  Description: Interventions:  - Assess and monitor patient's anxiety  level.   - Monitor for signs and symptoms (heart palpitations, chest pain, shortness of breath, headaches, nausea, feeling jumpy, restlessness, irritable, apprehensive).   - Collaborate with interdisciplinary team and initiate plan and interventions as ordered.  - [place] patient to unit/surroundings  - Explain treatment plan  - Encourage participation in care  - Encourage verbalization of concerns/fears  - Identify coping mechanisms  - Assist in developing anxiety-reducing skills  - Administer/offer alternative therapies  - Limit or eliminate stimulants  Outcome: Progressing     Problem: Knowledge Deficit  Goal: Patient/family/caregiver demonstrates understanding of disease process, treatment plan, medications, and discharge instructions  Description: Complete learning assessment and assess knowledge base.  Interventions:  - Provide teaching at level of understanding  - Provide teaching via preferred learning methods  Outcome: Progressing     Problem: SLEEP DISTURBANCE  Goal: Will exhibit normal sleeping pattern  Description: Interventions:  -  Assess the patients sleep pattern, noting recent changes  - Administer medication as ordered  - Decrease environmental stimuli, including noise, as appropriate during the night  - Encourage the patient to actively participate in unit groups and or exercise during the day to enhance ability to achieve adequate sleep at night  - Assess the patient, in the morning, encouraging a description of sleep experience  Outcome: Progressing

## 2024-10-16 NOTE — PROGRESS NOTES
10/16/24 0841   Team Meeting   Meeting Type Daily Rounds   Team Members Present   Team Members Present Physician;;Nurse   Physician Team Member Aria   Nursing Team Member [facility] Management Team Member Noa   Patient/Family Present   Patient Present No   Patient's Family Present No     Pt is medication and meal compliant. Pt is seclusive to his room, and denies SI/HI/AVH. Pt is calm and cooperative. Pt's discharge is pending placement.

## 2024-10-17 PROCEDURE — 99232 SBSQ HOSP IP/OBS MODERATE 35: CPT | Performed by: PSYCHIATRY & NEUROLOGY

## 2024-10-17 RX ADMIN — FAMOTIDINE 20 MG: 20 TABLET ORAL at 17:45

## 2024-10-17 RX ADMIN — FAMOTIDINE 20 MG: 20 TABLET ORAL at 08:17

## 2024-10-17 RX ADMIN — ARIPIPRAZOLE 5 MG: 5 TABLET ORAL at 08:17

## 2024-10-17 RX ADMIN — SERTRALINE HYDROCHLORIDE 150 MG: 100 TABLET ORAL at 08:17

## 2024-10-17 RX ADMIN — CYANOCOBALAMIN TAB 1000 MCG 1000 MCG: 1000 TAB at 08:17

## 2024-10-17 RX ADMIN — CHOLECALCIFEROL TAB 25 MCG (1000 UNIT) 2000 UNITS: 25 TAB at 08:17

## 2024-10-17 RX ADMIN — LEVOTHYROXINE SODIUM 37.5 MCG: 125 TABLET ORAL at 06:12

## 2024-10-17 RX ADMIN — ATORVASTATIN CALCIUM 10 MG: 10 TABLET, FILM COATED ORAL at 17:00

## 2024-10-17 NOTE — CASE MANAGEMENT
Franco is still unable to create an online [employer] account due to his 's license being  and not having a credit history (Per [facility] staff) and his identity not being able to be verified. Pt is going to check with his friend to see if he is able to use his mailing address to receive mail.   [place] DMV states drivers licenses are no longer printed onsite and must be mailed.   [facility] did state pt may be able to get a code to login online if he presents in person at the [facility] office.     Pt was able to verify that he current has approx $20,000 in his bank account.     [facility] in [place] returned request for previous medical records- no prior records exist.     Currently pt has no form of ID that is not . Will await friends response regarding use of his address as mailing address.

## 2024-10-17 NOTE — PROGRESS NOTES
10/17/24 0844   Team Meeting   Meeting Type Daily Rounds   Team Members Present   Team Members Present Physician;;Nurse   Physician Team Member Aria   Nursing Team Member [place]   Care Management Team Member Noa   Patient/Family Present   Patient Present No   Patient's Family Present No     Pt is isolative to his room and observed eating his meals in his room. Pt is medication and meal compliant. Pt denies SI/HI/AVH. Pt is pending placement.

## 2024-10-17 NOTE — PLAN OF CARE
Problem: Ineffective Coping  Goal: Participates in unit activities  Description: Interventions:  - Provide therapeutic environment   - Provide required programming   - Redirect inappropriate behaviors   Outcome: Progressing     Problem: Depression  Goal: Treatment Goal: Demonstrate behavioral control of depressive symptoms, verbalize feelings of improved mood/affect, and adopt new coping skills prior to discharge  Outcome: Progressing  Goal: Verbalize thoughts and feelings  Description: Interventions:  - Assess and re-assess patient's level of risk   - Engage patient in 1:1 interactions, daily, for a minimum of 15 minutes   - Encourage patient to express feelings, fears, frustrations, hopes   Outcome: Progressing  Goal: Refrain from harming self  Description: Interventions:  - Monitor patient closely, per order   - Supervise medication ingestion, monitor effects and side effects   Outcome: Progressing  Goal: Refrain from isolation  Description: Interventions:  - Develop a trusting relationship   - Encourage socialization   Outcome: Progressing     Problem: Anxiety  Goal: Anxiety is at manageable level  Description: Interventions:  - Assess and monitor patient's anxiety level.   - Monitor for signs and symptoms (heart palpitations, chest pain, shortness of breath, headaches, nausea, feeling jumpy, restlessness, irritable, apprehensive).   - Collaborate with interdisciplinary team and initiate plan and interventions as ordered.  - [place] patient to unit/surroundings  - Explain treatment plan  - Encourage participation in care  - Encourage verbalization of concerns/fears  - Identify coping mechanisms  - Assist in developing anxiety-reducing skills  - Administer/offer alternative therapies  - Limit or eliminate stimulants  Outcome: Progressing     Problem: DISCHARGE PLANNING - CARE MANAGEMENT  Goal: Discharge to post-acute care or home with appropriate resources  Description: INTERVENTIONS:  - Conduct assessment to  determine patient/family and health care team treatment goals, and need for post-acute services based on payer coverage, community resources, and patient preferences, and barriers to discharge  - Address psychosocial, clinical, and financial barriers to discharge as identified in assessment in conjunction with the patient/family and health care team  - Arrange appropriate level of post-acute services according to patient’s   needs and preference and payer coverage in collaboration with the physician and health care team  - Communicate with and update the patient/family, physician, and health care team regarding progress on the discharge plan  - Arrange appropriate transportation to post-acute venues  Outcome: Progressing     Problem: Knowledge Deficit  Goal: Patient/family/caregiver demonstrates understanding of disease process, treatment plan, medications, and discharge instructions  Description: Complete learning assessment and assess knowledge base.  Interventions:  - Provide teaching at level of understanding  - Provide teaching via preferred learning methods  Outcome: Progressing     Problem: SLEEP DISTURBANCE  Goal: Will exhibit normal sleeping pattern  Description: Interventions:  -  Assess the patients sleep pattern, noting recent changes  - Administer medication as ordered  - Decrease environmental stimuli, including noise, as appropriate during the night  - Encourage the patient to actively participate in unit groups and or exercise during the day to enhance ability to achieve adequate sleep at night  - Assess the patient, in the morning, encouraging a description of sleep experience  Outcome: Progressing

## 2024-10-17 NOTE — NURSING NOTE
Pt verbally denies SI, HI and A/V hallucinations. Awake and alert. Minimal eye contact but pleasant on approach. Content is appropriate, coherent and reality based. Perception displaying no alterations. Compliant with meds. Fair insight. Socially isolative, eating meals in bedroom.

## 2024-10-17 NOTE — NURSING NOTE
"Patient remained secluded to room this shift, appears depressed and withdrawn. Patient reports feeling \"ok\" Encouraged to participate in group and activity with no success. Patient compliant with medications and routine vitals. Denies SI/HI/AH/VH at this time.   "

## 2024-10-17 NOTE — PROGRESS NOTES
"Progress Note - Behavioral Health   Name: Franco Roberson 39 y.o. male I MRN: [id number]  Unit/Bed#: -02 I Date of Admission: 5/14/2024   Date of Service: 10/17/2024 I Hospital Day: 156     Assessment & Plan  MDD (major depressive disorder), recurrent severe, without psychosis (HCC)  No medications changes at this time, will continue to monitor and optimize as indicated:  Abilify 5 mg daily as mood adjunct   Zoloft 150 mg daily for depression and anxiety    Continue to encourage participation in group therapy, milieu therapy and occupational therapy.  Continue to assess for side effects of medications.  Continue collaboration with SLIM for medical co-morbidities as indicated.  Continue discussion with CM/SW to assist with obtaining collateral, disposition planning, and the implementation of patient-centered individualized plan of care.  Continue frequent safety checks and vitals per unit protocol.    Risks, benefits and possible side effects of Medications: Risks, benefits, and possible side effects of medications have previously been explained. No new medications at this time.      Legal status: 201    Disposition: to be determined, pending SOAR application for potential group home placement. OT Cognitive Evaluation completed: \"Pt would benefit from discharge to a supportive environment that can provide checks for safety and compliance with IADLs. \"      No associated orders from this encounter found during lookback period of 72 hours.  Autism spectrum disorder  Continue supportive care    No associated orders from this encounter found during lookback period of 72 hours.      Current medications:  Current Facility-Administered Medications   Medication Dose Route Frequency Provider Last Rate    acetaminophen  650 mg Oral Q6H PRN Basilio Brown MD      acetaminophen  650 mg Oral Q4H PRN Basilio Brown MD      acetaminophen  975 mg Oral Q6H PRN Basilio Brown MD      aluminum-magnesium " hydroxide-simethicone  30 mL Oral Q4H PRN Basilio Brown MD      ARIPiprazole  5 mg Oral Daily Basilio Panda MD      Artificial Tears  1 drop Both Eyes Q3H PRN Basilio Brown MD      atorvastatin  10 mg Oral Daily With Dinner WALLY Baptiste      benztropine  1 mg Intramuscular Q4H PRN Max 6/day Basilio Brown MD      benztropine  1 mg Oral Q4H PRN Max 6/day Basilio Brown MD      Cholecalciferol  2,000 Units Oral Daily WALLY Baptiste      cyanocobalamin  1,000 mcg Oral Daily WALLY Baptiste      Diclofenac Sodium  2 g Topical 4x Daily PRN WALLY Baptiste      hydrOXYzine HCL  50 mg Oral Q6H PRN Max 4/day Basilio Brown MD      Or    diphenhydrAMINE  50 mg Intramuscular Q6H PRN Basilio Brown MD      diphenhydrAMINE-zinc acetate   Topical Daily PRN Raj Guerrero MD      famotidine  20 mg Oral BID WALLY Baptiste      hydrOXYzine HCL  100 mg Oral Q6H PRN Max 4/day Basilio Brown MD      Or    LORazepam  2 mg Intramuscular Q6H PRN Basilio Brown MD      hydrOXYzine HCL  25 mg Oral Q6H PRN Max 4/day Basilio Brown MD      levothyroxine  37.5 mcg Oral Early Morning WALLY Baptiste      melatonin  6 mg Oral HS PRN WALLY Gomez      methocarbamol  500 mg Oral Q6H PRN WALLY Baptiste      OLANZapine  5 mg Oral Q4H PRN Max 3/day Basilio Brown MD      Or    OLANZapine  2.5 mg Intramuscular Q4H PRN Max 3/day Basilio Brown MD      OLANZapine  5 mg Oral Q3H PRN Max 3/day Basilio Brown MD      Or    OLANZapine  5 mg Intramuscular Q3H PRN Max 3/day Basilio Brown MD      OLANZapine  2.5 mg Oral Q4H PRN Max 6/day Basilio Brown MD      polyethylene glycol  17 g Oral Daily PRN Basilio Brown MD      propranolol  10 mg Oral Q8H PRN Basilio Brown MD      senna-docusate sodium  1 tablet Oral Daily PRN Basilio Brown MD      sertraline   150 mg Oral Daily WALLY Gomez      traZODone  50 mg Oral HS PRN Basilio Brown MD         Risks / Benefits of Treatment:    Risks, benefits, and possible side effects of medications explained to patient and patient verbalizes understanding and agreement for treatment.    Subjective:    Behavior over the last 24 hours: unchanged.     The patient was evaluated this morning for continuity of care and no acute distress noted throughout the evaluation.  Over the past 24 hours staff noted the patient was isolative to room, eating meals in his room, denying all symptoms.      Patient was visited on unit for continuing care; chart reviewed and discussed with multidisciplinary treatment team.  On approach, the patient was calm and cooperative. Franco has been working on SSI paperwork with CM and says he will continue the process today.  Denied any changes in mood, appetite, and energy level. No problem initiating and maintaining sleep.  Denied A/VH currently.  Denied SI/HI, intent or plan upon direct inquiry at this time.    Patient continues to be selectively interactive with staff and peers. No reports of aggression or self-injurious behavior on unit. No PRN medications used in the past 24 hours.    Patient accepted all offered medications and no adverse effects of medications noted or reported.    Sleep: normal  Appetite: normal  Medication side effects: No   ROS: no complaints    Mental Status Examination:  Appearance:  age appropriate, casually dressed, marginal hygiene, looks stated age   Behavior:  pleasant, cooperative, calm   Speech:  normal rate and volume, scant   Mood:  euthymic   Affect:  normal range and intensity, appropriate   Thought Process:  organized, logical, coherent, linear   Associations: concrete associations   Thought Content:  no overt delusions   Perceptual Disturbances: no auditory hallucinations, no visual hallucinations, does not appear responding to internal stimuli   Risk  Potential: Suicidal ideation - None at present  Homicidal ideation - None at present  Potential for aggression - No   Sensorium:  oriented to person, place, and time/date   Memory:  recent and remote memory grossly intact   Consciousness:  alert and awake   Attention/Concentration: attention span and concentration are age appropriate   Insight:  limited   Judgment: limited   Gait/Station: normal gait/station   Motor Activity: no abnormal movements       Vital signs in last 24 hours:    Temp:  [96.8 °F (36 °C)-97.5 °F (36.4 °C)] 96.8 °F (36 °C)  HR:  [63-76] 76  BP: (114-134)/(64-78) 134/78  Resp:  [16] 16  SpO2:  [97 %-98 %] 97 %  O2 Device: None (Room air)         Laboratory results: I have personally reviewed all pertinent laboratory/tests results    Results from the past 24 hours: No results found for this or any previous visit (from the past 24 hour(s)).    Progress Toward Goals: Franco is currently assessed as being at their baseline with continued need for medication management, supervision for safety and ADL’s. These services are not currently available in a less restrictive environment necessitating continued hospital stay.  Franco should remain on the unit until these services are available, due to likelihood of mental decompensation and readmission if discharged to an unsupervised setting.  Assertive discharge planning and collaboration with multiple providers (inpatient and community based) remains ongoing.  Franco is currently awaiting for private pay group home vs [facility].      Counseling / Coordination of Care:    Administrative Statements   I have spent a total time of 20 minutes in caring for this patient on the day of the visit/encounter including Counseling / Coordination of care, Documenting in the medical record, and Obtaining or reviewing history  .    Lita Knapp DO 10/17/24    This note was completed in part utilizing Dragon dictation Software. Grammatical, translation, syntax errors,  random word insertions, spelling mistakes, and incomplete sentences may be an occasional consequence of this system secondary to software limitations with voice recognition, ambient noise, and hardware issues. If you have any questions or concerns about the content, text, or information contained within the body of this dictation, please contact the provider for clarification.

## 2024-10-17 NOTE — PLAN OF CARE
Problem: Risk for Self Injury/Neglect  Goal: Treatment Goal: Remain safe during length of stay, learn and adopt new coping skills, and be free of self-injurious ideation, impulses and acts at the time of discharge  Outcome: Progressing     Problem: Depression  Goal: Verbalize thoughts and feelings  Description: Interventions:  - Assess and re-assess patient's level of risk   - Engage patient in 1:1 interactions, daily, for a minimum of 15 minutes   - Encourage patient to express feelings, fears, frustrations, hopes   Outcome: Progressing  Goal: Refrain from harming self  Description: Interventions:  - Monitor patient closely, per order   - Supervise medication ingestion, monitor effects and side effects   Outcome: Progressing     Problem: Ineffective Coping  Goal: Participates in unit activities  Description: Interventions:  - Provide therapeutic environment   - Provide required programming   - Redirect inappropriate behaviors   Outcome: Not Progressing     Problem: Depression  Goal: Refrain from isolation  Description: Interventions:  - Develop a trusting relationship   - Encourage socialization   Outcome: Not Progressing

## 2024-10-18 PROCEDURE — 99232 SBSQ HOSP IP/OBS MODERATE 35: CPT | Performed by: PSYCHIATRY & NEUROLOGY

## 2024-10-18 RX ADMIN — ATORVASTATIN CALCIUM 10 MG: 10 TABLET, FILM COATED ORAL at 17:05

## 2024-10-18 RX ADMIN — FAMOTIDINE 20 MG: 20 TABLET ORAL at 08:37

## 2024-10-18 RX ADMIN — CYANOCOBALAMIN TAB 1000 MCG 1000 MCG: 1000 TAB at 08:37

## 2024-10-18 RX ADMIN — CHOLECALCIFEROL TAB 25 MCG (1000 UNIT) 2000 UNITS: 25 TAB at 08:37

## 2024-10-18 RX ADMIN — ARIPIPRAZOLE 5 MG: 5 TABLET ORAL at 08:37

## 2024-10-18 RX ADMIN — SERTRALINE HYDROCHLORIDE 150 MG: 100 TABLET ORAL at 08:37

## 2024-10-18 RX ADMIN — FAMOTIDINE 20 MG: 20 TABLET ORAL at 17:06

## 2024-10-18 RX ADMIN — LEVOTHYROXINE SODIUM 37.5 MCG: 125 TABLET ORAL at 06:19

## 2024-10-18 NOTE — NURSING NOTE
Patient calm and cooperative upon approach. Patient is visible in patient lounge, playing chess with staff. Patient denies SI/HI/AH/VH. Patient is compliant with meds and meals

## 2024-10-18 NOTE — PLAN OF CARE
Problem: Ineffective Coping  Goal: Participates in unit activities  Description: Interventions:  - Provide therapeutic environment   - Provide required programming   - Redirect inappropriate behaviors   Outcome: Progressing     Problem: Depression  Goal: Treatment Goal: Demonstrate behavioral control of depressive symptoms, verbalize feelings of improved mood/affect, and adopt new coping skills prior to discharge  Outcome: Progressing  Goal: Verbalize thoughts and feelings  Description: Interventions:  - Assess and re-assess patient's level of risk   - Engage patient in 1:1 interactions, daily, for a minimum of 15 minutes   - Encourage patient to express feelings, fears, frustrations, hopes   Outcome: Progressing  Goal: Refrain from harming self  Description: Interventions:  - Monitor patient closely, per order   - Supervise medication ingestion, monitor effects and side effects   Outcome: Progressing  Goal: Refrain from isolation  Description: Interventions:  - Develop a trusting relationship   - Encourage socialization   Outcome: Progressing     Problem: Anxiety  Goal: Anxiety is at manageable level  Description: Interventions:  - Assess and monitor patient's anxiety level.   - Monitor for signs and symptoms (heart palpitations, chest pain, shortness of breath, headaches, nausea, feeling jumpy, restlessness, irritable, apprehensive).   - Collaborate with interdisciplinary team and initiate plan and interventions as ordered.  - [place] patient to unit/surroundings  - Explain treatment plan  - Encourage participation in care  - Encourage verbalization of concerns/fears  - Identify coping mechanisms  - Assist in developing anxiety-reducing skills  - Administer/offer alternative therapies  - Limit or eliminate stimulants  Outcome: Progressing     Problem: DISCHARGE PLANNING - CARE MANAGEMENT  Goal: Discharge to post-acute care or home with appropriate resources  Description: INTERVENTIONS:  - Conduct assessment to  determine patient/family and health care team treatment goals, and need for post-acute services based on payer coverage, community resources, and patient preferences, and barriers to discharge  - Address psychosocial, clinical, and financial barriers to discharge as identified in assessment in conjunction with the patient/family and health care team  - Arrange appropriate level of post-acute services according to patient’s   needs and preference and payer coverage in collaboration with the physician and health care team  - Communicate with and update the patient/family, physician, and health care team regarding progress on the discharge plan  - Arrange appropriate transportation to post-acute venues  Outcome: Progressing     Problem: Knowledge Deficit  Goal: Patient/family/caregiver demonstrates understanding of disease process, treatment plan, medications, and discharge instructions  Description: Complete learning assessment and assess knowledge base.  Interventions:  - Provide teaching at level of understanding  - Provide teaching via preferred learning methods  Outcome: Progressing     Problem: SLEEP DISTURBANCE  Goal: Will exhibit normal sleeping pattern  Description: Interventions:  -  Assess the patients sleep pattern, noting recent changes  - Administer medication as ordered  - Decrease environmental stimuli, including noise, as appropriate during the night  - Encourage the patient to actively participate in unit groups and or exercise during the day to enhance ability to achieve adequate sleep at night  - Assess the patient, in the morning, encouraging a description of sleep experience  Outcome: Progressing

## 2024-10-18 NOTE — PROGRESS NOTES
"Progress Note - Behavioral Health     Frnaco Roberson 39 y.o. male MRN: [id number]   Unit/Bed#: [facility] 345-02 Encounter: [id number]  Assessment & Plan  MDD (major depressive disorder), recurrent severe, without psychosis (HCC)  No medications changes at this time, will continue to monitor and optimize as indicated:  Abilify 5 mg daily as mood adjunct   Zoloft 150 mg daily for depression and anxiety  Continue to encourage participation in group therapy, milieu therapy and occupational therapy.  Continue to assess for side effects of medications.  Continue collaboration with SLIM for medical co-morbidities as indicated.  Continue discussion with CM/SW to assist with obtaining collateral, disposition planning, and the implementation of patient-centered individualized plan of care.  Continue frequent safety checks and vitals per unit protocol.    Risks, benefits and possible side effects of Medications: Risks, benefits, and possible side effects of medications have previously been explained. No new medications at this time.      Legal status: 201    Disposition: to be determined, pending SOAR application for potential group home placement. OT Cognitive Evaluation completed: \"Pt would benefit from discharge to a supportive environment that can provide checks for safety and compliance with IADLs. \"      No associated orders from this encounter found during lookback period of 72 hours.  Autism spectrum disorder  Continue supportive care    No associated orders from this encounter found during lookback period of 72 hours.         Recommended Treatment:     Planned medication and treatment changes:    All current active medications have been reviewed  Encourage group therapy, milieu therapy and occupational therapy  Behavioral Health checks for safety monitoring      Current medications:  Current Facility-Administered Medications   Medication Dose Route Frequency Provider Last Rate    acetaminophen  650 mg Oral Q6H SHELBY Garcia" MD Karyn      acetaminophen  650 mg Oral Q4H PRN Basilio Brown MD      acetaminophen  975 mg Oral Q6H PRN Basilio Brown MD      aluminum-magnesium hydroxide-simethicone  30 mL Oral Q4H PRN Basilio Brown MD      ARIPiprazole  5 mg Oral Daily Basilio Panda MD      Artificial Tears  1 drop Both Eyes Q3H PRN Basilio Brown MD      atorvastatin  10 mg Oral Daily With Dinner WALLY Baptiste      benztropine  1 mg Intramuscular Q4H PRN Max 6/day Basilio Brown MD      benztropine  1 mg Oral Q4H PRN Max 6/day Basilio Brown MD      Cholecalciferol  2,000 Units Oral Daily WALLY Baptiste      cyanocobalamin  1,000 mcg Oral Daily WALLY Baptiste      Diclofenac Sodium  2 g Topical 4x Daily PRN WALLY Baptiste      hydrOXYzine HCL  50 mg Oral Q6H PRN Max 4/day Basilio Brown MD      Or    diphenhydrAMINE  50 mg Intramuscular Q6H PRN Basilio Brown MD      diphenhydrAMINE-zinc acetate   Topical Daily PRN Raj Guerrero MD      famotidine  20 mg Oral BID WALLY Baptiste      hydrOXYzine HCL  100 mg Oral Q6H PRN Max 4/day Basilio Brown MD      Or    LORazepam  2 mg Intramuscular Q6H PRN Basilio Brown MD      hydrOXYzine HCL  25 mg Oral Q6H PRN Max 4/day Basilio Brown MD      levothyroxine  37.5 mcg Oral Early Morning WALLY Baptiste      melatonin  6 mg Oral HS PRN WALLY Gomez      methocarbamol  500 mg Oral Q6H PRN WALLY Baptiste      OLANZapine  5 mg Oral Q4H PRN Max 3/day Basilio Brown MD      Or    OLANZapine  2.5 mg Intramuscular Q4H PRN Max 3/day Basilio Brown MD      OLANZapine  5 mg Oral Q3H PRN Max 3/day Basilio Brown MD      Or    OLANZapine  5 mg Intramuscular Q3H PRN Max 3/day Basilio Brown MD      OLANZapine  2.5 mg Oral Q4H PRN Max 6/day Basilio Brown MD      polyethylene glycol  17 g Oral Daily PRN Basilio Garcia  MD Karyn      propranolol  10 mg Oral Q8H PRN Basilio Brown MD      senna-docusate sodium  1 tablet Oral Daily PRN Basilio Brown MD      sertraline  150 mg Oral Daily WALLY Gomez      traZODone  50 mg Oral HS PRN Basilio Brown MD         Risks / Benefits of Treatment:    Risks, benefits, and possible side effects of medications explained to patient and patient verbalizes understanding and agreement for treatment.    Subjective:    Behavior over the last 24 hours: unchanged.     Franco was seen today in follow-up for continuation of care. Per staff, no acute issues reported overnight. Franco is seen resting in bed without signs of acute distress. He states he is at his general baseline without worsening mood symptoms. No overt psychosis appreciated or delusional content present. He has been for the most part quiet but cooperative with care and expressing needs. He is still awaiting placement as he would decompensate rapidly without a structured setting. Awaiting SSI paperwork.  Franco adamantly denies suicidal/homicidal ideation in addition to thoughts of self-injury. Franco presently is contacting for safety on the unit and feels comfortable to confide in staff if thoughts arise.  Franco has been complaint with medications and tolerating without any side effects.       Sleep: normal  Appetite: normal  Medication side effects: No   ROS: no complaints    Mental Status Evaluation:    Appearance:  age appropriate, casually dressed, dressed appropriately   Behavior:  cooperative, calm, guarded   Speech:  soft, decreased volume   Mood:  euthymic   Affect:  constricted   Thought Process:  goal directed, linear   Associations: intact associations, concrete   Thought Content:  no overt delusions   Perceptual Disturbances: no auditory hallucinations, no visual hallucinations, does not appear responding to internal stimuli   Risk Potential: Suicidal ideation - None at present  Homicidal  ideation - None at present  Potential for aggression - No   Sensorium:  oriented to person, place, and time/date   Memory:  recent and remote memory grossly intact   Consciousness:  alert and awake   Attention/Concentration: attention span and concentration are age appropriate   Insight:  limited   Judgment: limited   Gait/Station: in bed   Motor Activity: no abnormal movements     Vital signs in last 24 hours:    Temp:  [96.8 °F (36 °C)-97.6 °F (36.4 °C)] 97.6 °F (36.4 °C)  HR:  [55-76] 55  BP: (120-134)/(66-78) 120/66  Resp:  [16] 16  SpO2:  [97 %-99 %] 99 %  O2 Device: None (Room air)    Laboratory results: I have personally reviewed all pertinent laboratory/tests results    Results from the past 24 hours: No results found for this or any previous visit (from the past 24 hour(s)).    Suicide/Homicide Risk Assessment:    Risk of Harm to Self:   Nursing Suicide Risk Assessment Last 24 hours: C-SSRS Risk (Since Last Contact)  Calculated C-SSRS Risk Score (Since Last Contact): No Risk Indicated    Risk of Harm to Others:  Nursing Homicide Risk Assessment: Violence Risk to Others: Denies within past 6 months    The following interventions are recommended: Behavioral Health checks for safety monitoring, continued hospitalization on locked unit    Progress Toward Goals: progressing    Counseling / Coordination of Care:    Total floor / unit time spent today 25 minutes. Greater than 50% of total time was spent with the patient and / or family counseling and / or coordination of care. A description of counseling / coordination of care:    Elizabeth Pennington PA-C 10/18/24

## 2024-10-18 NOTE — NURSING NOTE
Pt visible on unit in milieu and dayroom. He interacts occasionally with his peers. Medication and meal compliant. He denies SI/HI/AH/VH, but expresses feeling frustrated about waiting for social security and is housing situation. He said that he was feeling hopeful and now it feels like everything is just on hold. He attended group today. No PRN's requested. No unmet needs.

## 2024-10-18 NOTE — PLAN OF CARE
Pt attending some groups this week. Pt typically quiet, but participates appropriately when prompted.

## 2024-10-18 NOTE — PROGRESS NOTES
10/18/24 0841   Team Meeting   Meeting Type Daily Rounds   Team Members Present   Team Members Present Physician;Nurse;   Physician Team Member Aditi/Barker   Nursing Team Member Erick   Care Management Team Member Suhas   Patient/Family Present   Patient Present No   Patient's Family Present No     Pt med/meal compliant. Visible on the unit, social with select peers and staff. Pleasant, calm, cooperative.

## 2024-10-19 PROCEDURE — 99232 SBSQ HOSP IP/OBS MODERATE 35: CPT | Performed by: PSYCHIATRY & NEUROLOGY

## 2024-10-19 RX ADMIN — ARIPIPRAZOLE 5 MG: 5 TABLET ORAL at 08:47

## 2024-10-19 RX ADMIN — CYANOCOBALAMIN TAB 1000 MCG 1000 MCG: 1000 TAB at 08:47

## 2024-10-19 RX ADMIN — FAMOTIDINE 20 MG: 20 TABLET ORAL at 08:47

## 2024-10-19 RX ADMIN — LEVOTHYROXINE SODIUM 37.5 MCG: 125 TABLET ORAL at 05:18

## 2024-10-19 RX ADMIN — ATORVASTATIN CALCIUM 10 MG: 10 TABLET, FILM COATED ORAL at 17:07

## 2024-10-19 RX ADMIN — FAMOTIDINE 20 MG: 20 TABLET ORAL at 17:07

## 2024-10-19 RX ADMIN — CHOLECALCIFEROL TAB 25 MCG (1000 UNIT) 2000 UNITS: 25 TAB at 08:46

## 2024-10-19 RX ADMIN — SERTRALINE HYDROCHLORIDE 150 MG: 100 TABLET ORAL at 08:46

## 2024-10-19 NOTE — PLAN OF CARE
Problem: Ineffective Coping  Goal: Participates in unit activities  Description: Interventions:  - Provide therapeutic environment   - Provide required programming   - Redirect inappropriate behaviors   Outcome: Progressing     Problem: Depression  Goal: Treatment Goal: Demonstrate behavioral control of depressive symptoms, verbalize feelings of improved mood/affect, and adopt new coping skills prior to discharge  Outcome: Progressing  Goal: Verbalize thoughts and feelings  Description: Interventions:  - Assess and re-assess patient's level of risk   - Engage patient in 1:1 interactions, daily, for a minimum of 15 minutes   - Encourage patient to express feelings, fears, frustrations, hopes   Outcome: Progressing  Goal: Refrain from harming self  Description: Interventions:  - Monitor patient closely, per order   - Supervise medication ingestion, monitor effects and side effects   Outcome: Progressing  Goal: Refrain from isolation  Description: Interventions:  - Develop a trusting relationship   - Encourage socialization   Outcome: Progressing     Problem: Anxiety  Goal: Anxiety is at manageable level  Description: Interventions:  - Assess and monitor patient's anxiety level.   - Monitor for signs and symptoms (heart palpitations, chest pain, shortness of breath, headaches, nausea, feeling jumpy, restlessness, irritable, apprehensive).   - Collaborate with interdisciplinary team and initiate plan and interventions as ordered.  - [place] patient to unit/surroundings  - Explain treatment plan  - Encourage participation in care  - Encourage verbalization of concerns/fears  - Identify coping mechanisms  - Assist in developing anxiety-reducing skills  - Administer/offer alternative therapies  - Limit or eliminate stimulants  Outcome: Progressing

## 2024-10-19 NOTE — PROGRESS NOTES
"    Psychiatric Progress Note - Department of Behavioral Health   Franco Roberson 39 y.o. male MRN: [id number]  Unit/Bed#: [facility] 345-02 Encounter: [id number]    ASSESSMENT & PLAN     Assessment & Plan  MDD (major depressive disorder), recurrent severe, without psychosis (HCC)  No medications changes at this time, will continue to monitor and optimize as indicated:  Abilify 5 mg daily as mood adjunct   Zoloft 150 mg daily for depression and anxiety  Continue to encourage participation in group therapy, milieu therapy and occupational therapy.  Continue to assess for side effects of medications.  Continue collaboration with [facility] for medical co-morbidities as indicated.  Continue discussion with CM/SW to assist with obtaining collateral, disposition planning, and the implementation of patient-centered individualized plan of care.  Continue frequent safety checks and vitals per unit protocol.    Risks, benefits and possible side effects of Medications: Risks, benefits, and possible side effects of medications have previously been explained. No new medications at this time.      Legal status: 201    Disposition: to be determined, pending SOAR application for potential group home placement. OT Cognitive Evaluation completed: \"Pt would benefit from discharge to a supportive environment that can provide checks for safety and compliance with IADLs. \"      No associated orders from this encounter found during lookback period of 72 hours.  Autism spectrum disorder  Continue supportive care    No associated orders from this encounter found during lookback period of 72 hours.        SUBJECTIVE     Patient evaluated this a.m. for continuity of care. Patient was discussed at length with nursing and treatment team. Per nursing, patient was in good behavioral control and attending and participating in groups.  He was occasionally interactive with peers.    No acute distress is noted throughout evaluation. Franco Roberson stated his mood was " "\"pretty good.\"  He endorsed adequate sleep.  He endorsed adequate appetite.  He denied medication side effects.  He denied suicidal/homicidal ideation in addition to thoughts of self-injury, receptive to crisis planning provided by this writer, contacting for safety in the inpatient setting, admitting to an ability to appropriately confide in staff including this writer.  He denied auditory visualizations.    PSYCHIATRIC REVIEW OF SYSTEMS     Behavior over the last 24 hours:  unchanged  Sleep: normal  Appetite: normal  Medication side effects: No    REVIEW OF SYSTEMS   Review of systems: no complaints    OBJECTIVE     Vital Signs in Past 24 Hours:  Temp:  [97.1 °F (36.2 °C)-97.5 °F (36.4 °C)] 97.5 °F (36.4 °C)  HR:  [58-72] 58  BP: (116-127)/(67-79) 127/79  Resp:  [16] 16  SpO2:  [97 %] 97 %  O2 Device: None (Room air)    Intake/Output in Past 24 hours:  No intake/output data recorded.  No intake/output data recorded.        Laboratory Results:  I have personally reviewed all pertinent laboratory/tests results.    Behavioral Health Medications: all current active meds have been reviewed.      Mental Status Evaluation:  Appearance:  Dressed in casual clothing, poor grooming and hygiene, appears stated age, overtly  male   Behavior:  Pleasant and cooperative   Speech:  normal pitch and normal volume   Mood:  \"Pretty good\"   Affect:  blunted and mood-incongruent   Language naming objects and repeating phrases   Thought Process:  [place]   Thought Content:  No overt paranoia or delusions   Perceptual Disturbances: Denies auditory visual hallucinations.  Does not appear to be responding to internal stimuli.   Risk Potential: Suicidal Ideations none, Homicidal Ideations none, and Potential for Aggression No   Sensorium:  person, place, time/date, and situation   Cognition:  recent and remote memory grossly intact   Consciousness:  alert and awake    Attention: attention span and concentration were age " appropriate   Insight:  limited   Judgment: limited   Intellect Not assessed   Gait/Station: normal gait/station   Motor Activity: no abnormal movements     Memory: Short and long term memory intact     Progress Toward Goals: Slowly progressing, as evidenced by their participation (or lack thereof) in individual, social and therapeutic milieu in addition to adherence to their medication regimen.    Recommended Treatment:   See above for assessment and plan.    Inpatient Psychiatric Certification: Based upon physical, mental and social evaluations, I certify that inpatient psychiatric services are medically necessary for this patient for a duration of greater than 2 midnights for the treatment of MDD (major depressive disorder), recurrent severe, without psychosis (HCC) including psychotropic medication management, participation in the therapeutic milieu and referrals as indicated. Available alternative community resources do not meet the patient's mental health care needs. I further attest that an established written individualized plan of care has been implemented and is outlined in the patient's medical records.    Counseling/Coordination of Care    I have expended greater than 15 minutes in which more than 50% of this time was expended in counseling/coordination of patient care relating to diagnostic results, prognosis, potential risks and benefits of management options, instructions for appropriate management, patient and/or collateral education, importance of adherence to management and/or risk factor reductions. Patient's rights, confidentiality, exceptions to confidentiality, use of electronic medical record including appropriate staff access to medical record regarding behavioral health services and consent to treatment were reviewed.    Note Share:     This note was not shared with the patient due to reasonable likelihood of causing patient harm     This note has been constructed using a voice recognition  system. There may be translation, syntax,  or grammatical errors. If you have any questions, please contact the dictating provider.    Jaret Rodriguez DO 10/19/24   PGY-II

## 2024-10-19 NOTE — NURSING NOTE
Patient is calm and cooperative, brightens on approach. Visible and social with selected peers occasionally, ate both meals in his room. Denies SI/HI/AVH and pain. Medication and meal compliant.

## 2024-10-19 NOTE — NURSING NOTE
"Patient visible on the unit throughout the evening. Joined and participated in group activities. Denied any unmet needs. Retreated to his bed early \"I am bored, so I am going to bed.\" No scheduled HS medications and no PRNs needed.    "

## 2024-10-20 PROCEDURE — 99232 SBSQ HOSP IP/OBS MODERATE 35: CPT | Performed by: PSYCHIATRY & NEUROLOGY

## 2024-10-20 RX ADMIN — LEVOTHYROXINE SODIUM 37.5 MCG: 125 TABLET ORAL at 06:35

## 2024-10-20 RX ADMIN — ARIPIPRAZOLE 5 MG: 5 TABLET ORAL at 08:41

## 2024-10-20 RX ADMIN — FAMOTIDINE 20 MG: 20 TABLET ORAL at 08:41

## 2024-10-20 RX ADMIN — ATORVASTATIN CALCIUM 10 MG: 10 TABLET, FILM COATED ORAL at 17:17

## 2024-10-20 RX ADMIN — SERTRALINE HYDROCHLORIDE 150 MG: 100 TABLET ORAL at 08:41

## 2024-10-20 RX ADMIN — FAMOTIDINE 20 MG: 20 TABLET ORAL at 17:17

## 2024-10-20 RX ADMIN — CYANOCOBALAMIN TAB 1000 MCG 1000 MCG: 1000 TAB at 08:41

## 2024-10-20 RX ADMIN — CHOLECALCIFEROL TAB 25 MCG (1000 UNIT) 2000 UNITS: 25 TAB at 08:41

## 2024-10-20 NOTE — NURSING NOTE
"Pt visible at times,  mostly withdrawn to his room.  Pt attended wrap up group.  Pt denies HI/SI/AVH, verbalized feeling \"a [place]ille set back\"  dues to \"speed bumps\"  concerning the process of getting social security benefits.  Pt is calm and courteous when interacting with staff and reports he has \"enough things to keep me busy\".  Pt compliant with scheduled medication,  no unmet need reported at this time.   "

## 2024-10-20 NOTE — PLAN OF CARE
Problem: Depression  Goal: Treatment Goal: Demonstrate behavioral control of depressive symptoms, verbalize feelings of improved mood/affect, and adopt new coping skills prior to discharge  Outcome: Progressing  Goal: Verbalize thoughts and feelings  Description: Interventions:  - Assess and re-assess patient's level of risk   - Engage patient in 1:1 interactions, daily, for a minimum of 15 minutes   - Encourage patient to express feelings, fears, frustrations, hopes   Outcome: Progressing  Goal: Refrain from harming self  Description: Interventions:  - Monitor patient closely, per order   - Supervise medication ingestion, monitor effects and side effects   Outcome: Progressing  Goal: Refrain from isolation  Description: Interventions:  - Develop a trusting relationship   - Encourage socialization   Outcome: Progressing     Problem: Anxiety  Goal: Anxiety is at manageable level  Description: Interventions:  - Assess and monitor patient's anxiety level.   - Monitor for signs and symptoms (heart palpitations, chest pain, shortness of breath, headaches, nausea, feeling jumpy, restlessness, irritable, apprehensive).   - Collaborate with interdisciplinary team and initiate plan and interventions as ordered.  - [place] patient to unit/surroundings  - Explain treatment plan  - Encourage participation in care  - Encourage verbalization of concerns/fears  - Identify coping mechanisms  - Assist in developing anxiety-reducing skills  - Administer/offer alternative therapies  - Limit or eliminate stimulants  Outcome: Progressing     Problem: Knowledge Deficit  Goal: Patient/family/caregiver demonstrates understanding of disease process, treatment plan, medications, and discharge instructions  Description: Complete learning assessment and assess knowledge base.  Interventions:  - Provide teaching at level of understanding  - Provide teaching via preferred learning methods  Outcome: Progressing     Problem: SLEEP  DISTURBANCE  Goal: Will exhibit normal sleeping pattern  Description: Interventions:  -  Assess the patients sleep pattern, noting recent changes  - Administer medication as ordered  - Decrease environmental stimuli, including noise, as appropriate during the night  - Encourage the patient to actively participate in unit groups and or exercise during the day to enhance ability to achieve adequate sleep at night  - Assess the patient, in the morning, encouraging a description of sleep experience  Outcome: Progressing

## 2024-10-20 NOTE — NURSING NOTE
"Pt withdrawn to his room, sleeping for most of the fist shift,  but present for dinner.  Pt compliant with scheduled meds and denies HI/SI/AVH.  Pt appears more hopeful than yesterday,  reports having \"plenty of things\" to keep him occupied.  No remarkable behaviors observed.    "

## 2024-10-20 NOTE — PROGRESS NOTES
"    Psychiatric Progress Note - Department of Behavioral Health   Franco Roberson 39 y.o. male MRN: [id number]  Unit/Bed#: [facility] 345-02 Encounter: [id number]    ASSESSMENT & PLAN     Assessment & Plan  MDD (major depressive disorder), recurrent severe, without psychosis (HCC)  No medications changes at this time, will continue to monitor and optimize as indicated:  Abilify 5 mg daily as mood adjunct   Zoloft 150 mg daily for depression and anxiety  Continue to encourage participation in group therapy, milieu therapy and occupational therapy.  Continue to assess for side effects of medications.  Continue collaboration with PATRICIA for medical co-morbidities as indicated.  Continue discussion with CM/SW to assist with obtaining collateral, disposition planning, and the implementation of patient-centered individualized plan of care.  Continue frequent safety checks and vitals per unit protocol.    Risks, benefits and possible side effects of Medications: Risks, benefits, and possible side effects of medications have previously been explained. No new medications at this time.      Legal status: 201    Disposition: to be determined, pending SOAR application for potential group home placement. OT Cognitive Evaluation completed: \"Pt would benefit from discharge to a supportive environment that can provide checks for safety and compliance with IADLs. \"      No associated orders from this encounter found during lookback period of 72 hours.  Autism spectrum disorder  Continue supportive care    No associated orders from this encounter found during lookback period of 72 hours.      SUBJECTIVE     Patient evaluated this a.m. for continuity of care. Patient was discussed at length with nursing and treatment team. Per nursing, patient has been in good behavioral control and attending group.    No acute distress is noted throughout evaluation. Franco Roberson endorses mood as \"good.\"  He endorsed adequate sleep and appetite.  He denied " "medication side effects.  He denied auditory visualizations.  He attended group and plans to go to group today.  He denied suicidal/homicidal ideation in addition to thoughts of self-injury, receptive to crisis planning provided by this writer, contacting for safety in the inpatient setting, admitting to an ability to appropriately confide in staff including this writer.     PSYCHIATRIC REVIEW OF SYSTEMS     Behavior over the last 24 hours:  unchanged  Sleep: normal  Appetite: normal  Medication side effects: No    REVIEW OF SYSTEMS   Review of systems: no complaints    OBJECTIVE     Vital Signs in Past 24 Hours:  Temp:  [97.2 °F (36.2 °C)-98.1 °F (36.7 °C)] 98.1 °F (36.7 °C)  HR:  [65-68] 68  BP: (118-120)/(64-76) 118/64  Resp:  [16] 16  SpO2:  [96 %-98 %] 96 %  O2 Device: None (Room air)    Intake/Output in Past 24 hours:  No intake/output data recorded.  No intake/output data recorded.        Laboratory Results:  I have personally reviewed all pertinent laboratory/tests results.    Behavioral Health Medications: all current active meds have been reviewed.      Mental Status Evaluation:  Appearance:  Dressed casually, poor grooming/hygiene, disheveled, laying in bed, overtly  male, appears stated age   Behavior:  Pleasant and cooperative   Speech:  Normal rate and volume   Mood:  \"Good.\"   Affect:  blunted and mood-incongruent   Language naming objects and repeating phrases   Thought Process:  goal directed and logical   Thought Content:  No overt delusions or paranoia   Perceptual Disturbances: Denies auditory visualizations.  Does not appear to be responding to internal stimuli   Risk Potential: Suicidal Ideations none, Homicidal Ideations none, and Potential for Aggression No   Sensorium:  person, place, time/date, and situation   Cognition:  recent and remote memory grossly intact   Consciousness:  alert and awake    Attention: attention span and concentration were age appropriate   Insight:  limited "   Judgment: limited   Intellect Not assessed   Gait/Station: normal gait/station   Motor Activity: no abnormal movements     Memory: Short and long term memory intact     Progress Toward Goals: Progressing, as evidenced by their participation (or lack thereof) in individual, social and therapeutic milieu in addition to adherence to their medication regimen.    Recommended Treatment:   See above for assessment and plan.    Inpatient Psychiatric Certification: Based upon physical, mental and social evaluations, I certify that inpatient psychiatric services are medically necessary for this patient for a duration of greater than 2 midnights for the treatment of MDD (major depressive disorder), recurrent severe, without psychosis (HCC) including psychotropic medication management, participation in the therapeutic milieu and referrals as indicated. Available alternative community resources do not meet the patient's mental health care needs. I further attest that an established written individualized plan of care has been implemented and is outlined in the patient's medical records.    Counseling/Coordination of Care    I have expended greater than 15 minutes in which more than 50% of this time was expended in counseling/coordination of patient care relating to diagnostic results, prognosis, potential risks and benefits of management options, instructions for appropriate management, patient and/or collateral education, importance of adherence to management and/or risk factor reductions. Patient's rights, confidentiality, exceptions to confidentiality, use of electronic medical record including appropriate staff access to medical record regarding behavioral health services and consent to treatment were reviewed.    Note Share:     This note was not shared with the patient due to reasonable likelihood of causing patient harm     This note has been constructed using a voice recognition system. There may be translation,  syntax,  or grammatical errors. If you have any questions, please contact the dictating provider.    Jaret Rodriguez,  10/20/24   PGY-II

## 2024-10-20 NOTE — NURSING NOTE
Patient is calm and cooperative, isolative to his room and self.  Appears depressed. Medication and meal compliant. Denies SI/HI/AVH and pain.

## 2024-10-21 PROCEDURE — 99232 SBSQ HOSP IP/OBS MODERATE 35: CPT | Performed by: PSYCHIATRY & NEUROLOGY

## 2024-10-21 RX ADMIN — CYANOCOBALAMIN TAB 1000 MCG 1000 MCG: 1000 TAB at 08:40

## 2024-10-21 RX ADMIN — LEVOTHYROXINE SODIUM 37.5 MCG: 125 TABLET ORAL at 06:23

## 2024-10-21 RX ADMIN — ARIPIPRAZOLE 5 MG: 5 TABLET ORAL at 08:40

## 2024-10-21 RX ADMIN — CHOLECALCIFEROL TAB 25 MCG (1000 UNIT) 2000 UNITS: 25 TAB at 08:40

## 2024-10-21 RX ADMIN — FAMOTIDINE 20 MG: 20 TABLET ORAL at 08:40

## 2024-10-21 RX ADMIN — FAMOTIDINE 20 MG: 20 TABLET ORAL at 17:20

## 2024-10-21 RX ADMIN — ATORVASTATIN CALCIUM 10 MG: 10 TABLET, FILM COATED ORAL at 17:20

## 2024-10-21 RX ADMIN — SERTRALINE HYDROCHLORIDE 150 MG: 100 TABLET ORAL at 08:40

## 2024-10-21 NOTE — NURSING NOTE
"Pt is visible on unit in milieu and dayroom. He prefers to sit in his room when feeling overstimulated by the lights. He is pleasant with peers and staff. Medication and meal compliant. He denies SI/HI/AH/VH, but expresses he is feeling discouraged in the process of social security. Pt did brighten and stated \"they said if social security doesn't work out there are other options, so that's ok\". No PRN's requested. No unmet needs.   "

## 2024-10-21 NOTE — PLAN OF CARE
Pt attends some groups, more during the week, than weekend. Pt typically quiet, but participates appropriately when prompted.

## 2024-10-21 NOTE — PROGRESS NOTES
"Progress Note - Behavioral Health   Name: Franco Roberson 39 y.o. male I MRN: [id number]  Unit/Bed#: -02 I Date of Admission: 5/14/2024   Date of Service: 10/21/2024 I Hospital Day: 160     Assessment & Plan  MDD (major depressive disorder), recurrent severe, without psychosis (HCC)  No medications changes at this time, will continue to monitor and optimize as indicated:  Abilify 5 mg daily as mood adjunct   Zoloft 150 mg daily for depression and anxiety  Continue to encourage participation in group therapy, milieu therapy and occupational therapy.  Continue to assess for side effects of medications.  Continue collaboration with SLIM for medical co-morbidities as indicated.  Continue discussion with CM/SW to assist with obtaining collateral, disposition planning, and the implementation of patient-centered individualized plan of care.  Continue frequent safety checks and vitals per unit protocol.    Risks, benefits and possible side effects of Medications: Risks, benefits, and possible side effects of medications have previously been explained. No new medications at this time.      Legal status: 201    Disposition: to be determined, pending SOAR application for potential group home placement. OT Cognitive Evaluation completed: \"Pt would benefit from discharge to a supportive environment that can provide checks for safety and compliance with IADLs. \"      No associated orders from this encounter found during lookback period of 72 hours.  Autism spectrum disorder  Continue supportive care    No associated orders from this encounter found during lookback period of 72 hours.      Current medications:  Current Facility-Administered Medications   Medication Dose Route Frequency Provider Last Rate    acetaminophen  650 mg Oral Q6H PRN Basilio Brown MD      acetaminophen  650 mg Oral Q4H PRN Basilio Brown MD      acetaminophen  975 mg Oral Q6H PRN Basilio Brown MD      aluminum-magnesium " hydroxide-simethicone  30 mL Oral Q4H PRN Basilio Brown MD      ARIPiprazole  5 mg Oral Daily Basilio Panda MD      Artificial Tears  1 drop Both Eyes Q3H PRN Basilio Brown MD      atorvastatin  10 mg Oral Daily With Dinner WALLY Baptiste      benztropine  1 mg Intramuscular Q4H PRN Max 6/day Basilio Brown MD      benztropine  1 mg Oral Q4H PRN Max 6/day Basilio Brown MD      Cholecalciferol  2,000 Units Oral Daily WALLY Baptiste      cyanocobalamin  1,000 mcg Oral Daily WALLY Baptiste      Diclofenac Sodium  2 g Topical 4x Daily PRN WALLY Baptiste      hydrOXYzine HCL  50 mg Oral Q6H PRN Max 4/day Basilio Brown MD      Or    diphenhydrAMINE  50 mg Intramuscular Q6H PRN Basilio Brown MD      diphenhydrAMINE-zinc acetate   Topical Daily PRN Raj Guerrero MD      famotidine  20 mg Oral BID WALLY Baptiste      hydrOXYzine HCL  100 mg Oral Q6H PRN Max 4/day Basilio Brown MD      Or    LORazepam  2 mg Intramuscular Q6H PRN Basilio Brown MD      hydrOXYzine HCL  25 mg Oral Q6H PRN Max 4/day Basilio Brown MD      levothyroxine  37.5 mcg Oral Early Morning WALLY Baptiste      melatonin  6 mg Oral HS PRN WALLY Gomez      methocarbamol  500 mg Oral Q6H PRN WALLY Baptiste      OLANZapine  5 mg Oral Q4H PRN Max 3/day Basilio Brown MD      Or    OLANZapine  2.5 mg Intramuscular Q4H PRN Max 3/day Basilio Brown MD      OLANZapine  5 mg Oral Q3H PRN Max 3/day Basilio Brown MD      Or    OLANZapine  5 mg Intramuscular Q3H PRN Max 3/day Basilio Brown MD      OLANZapine  2.5 mg Oral Q4H PRN Max 6/day Basilio Brown MD      polyethylene glycol  17 g Oral Daily PRN Basilio Brown MD      propranolol  10 mg Oral Q8H PRN Basilio Brown MD      senna-docusate sodium  1 tablet Oral Daily PRN Basilio Brown MD      sertraline  " 150 mg Oral Daily WALLY Gomez      traZODone  50 mg Oral HS PRN Basilio Brown MD         Risks / Benefits of Treatment:    Risks, benefits, and possible side effects of medications explained to patient and patient verbalizes understanding and agreement for treatment.    Subjective:    Behavior over the last 24 hours: unchanged.     The patient was evaluated this morning for continuity of care and no acute distress noted throughout the evaluation.  Over the past 24 hours staff noted the patient was calm, cooperative, mostly spends time in his room, compliant with medications and meals.      Patient was visited on unit for continuing care; chart reviewed and discussed with multidisciplinary treatment team.  On approach, the patient was calm and cooperative.  Franco denies any changes in his symptoms, states that he is still working on the Social Security paperwork but has hit some \"roadblocks.\" Denied any changes in mood, appetite, and energy level. No problem initiating and maintaining sleep.  Denied A/VH currently.  Denied SI/HI, intent or plan upon direct inquiry at this time.    Patient continues to be selectively interactive with staff and peers. No reports of aggression or self-injurious behavior on unit. No PRN medications used in the past 24 hours.    Patient accepted all offered medications and no adverse effects of medications noted or reported.    Sleep: normal  Appetite: normal  Medication side effects: No   ROS: no complaints    Mental Status Examination:  Appearance:  age appropriate, casually dressed, marginal hygiene, looks stated age   Behavior:  pleasant, cooperative, calm   Speech:  normal rate and volume, scant   Mood:  euthymic   Affect:  constricted   Thought Process:  organized, logical, coherent, goal directed, linear   Associations: concrete associations   Thought Content:  no overt delusions   Perceptual Disturbances: no auditory hallucinations, no visual hallucinations, " does not appear responding to internal stimuli   Risk Potential: Suicidal ideation - None at present  Homicidal ideation - None at present  Potential for aggression - No   Sensorium:  oriented to person, place, and time/date   Memory:  recent and remote memory grossly intact   Consciousness:  alert and awake   Attention/Concentration: attention span and concentration are age appropriate   Insight:  limited   Judgment: limited   Gait/Station: normal gait/station   Motor Activity: no abnormal movements       Vital signs in last 24 hours:    Temp:  [96.4 °F (35.8 °C)-97.4 °F (36.3 °C)] 96.4 °F (35.8 °C)  HR:  [66-73] 73  BP: (106-141)/(76-77) 141/76  Resp:  [16] 16  SpO2:  [67 %-96 %] 67 %  O2 Device: None (Room air)         Laboratory results: I have personally reviewed all pertinent laboratory/tests results    Most Recent Labs:   Lab Results   Component Value Date    WBC 7.01 06/27/2024    RBC 4.54 06/27/2024    HGB 14.5 06/27/2024    HCT 44.4 06/27/2024     06/27/2024    RDW 12.0 06/27/2024    NEUTROABS 4.01 06/27/2024    SODIUM 139 07/10/2024    K 3.8 07/10/2024     07/10/2024    CO2 27 07/10/2024    BUN 16 07/10/2024    CREATININE 0.92 07/10/2024    GLUC 87 07/10/2024    CALCIUM 9.3 07/10/2024    AST 28 07/10/2024    ALT 36 07/10/2024    ALKPHOS 66 07/10/2024    TP 7.7 07/10/2024    ALB 4.4 07/10/2024    TBILI 0.55 07/10/2024    CHOLESTEROL 168 09/19/2024    HDL 42 09/19/2024    TRIG 137 09/19/2024    LDLCALC 99 09/19/2024    NONHDLC 126 09/19/2024    [id number] 6.241 (H) 09/10/2024    FREET4 0.56 (L) 09/10/2024    SYPHILISAB Non-reactive 05/15/2024       Progress Toward Goals: Franco is currently assessed as being at their baseline with continued need for medication management, supervision for safety and ADL’s. These services are not currently available in a less restrictive environment necessitating continued hospital stay.  Franco should remain on the unit until these services are available, due  to likelihood of mental decompensation and readmission if discharged to an unsupervised setting.  Assertive discharge planning and collaboration with multiple providers (inpatient and community based) remains ongoing.  Franco is currently awaiting for private pay group home versus safe [place]      Counseling / Coordination of Care:    Administrative Statements   I have spent a total time of 20 minutes in caring for this patient on the day of the visit/encounter including Counseling / Coordination of care, Documenting in the medical record, and Obtaining or reviewing history  .    Lita Knapp,  10/21/24    This note was completed in part utilizing Dragon dictation Software. Grammatical, translation, syntax errors, random word insertions, spelling mistakes, and incomplete sentences may be an occasional consequence of this system secondary to software limitations with voice recognition, ambient noise, and hardware issues. If you have any questions or concerns about the content, text, or information contained within the body of this dictation, please contact the provider for clarification.

## 2024-10-21 NOTE — PROGRESS NOTES
10/21/24 0100   Team Meeting   Meeting Type Daily Rounds   Team Members Present   Team Members Present Physician;;Nurse;   Physician Team Member Lucero   [facility]   Care Management Team Member Noa   Patient/Family Present   Patient Present No   Patient's Family Present No     Pt is medication and meal compliant. Pt is working on social security application. Pt is pleasant calm and cooperative. Pt denies SI/HI/AVH. Pt is pending placement.

## 2024-10-21 NOTE — PLAN OF CARE
Problem: Ineffective Coping  Goal: Participates in unit activities  Description: Interventions:  - Provide therapeutic environment   - Provide required programming   - Redirect inappropriate behaviors   Outcome: Progressing  Note: Attended wrap up 10/20     Problem: Depression  Goal: Treatment Goal: Demonstrate behavioral control of depressive symptoms, verbalize feelings of improved mood/affect, and adopt new coping skills prior to discharge  Outcome: Progressing  Goal: Verbalize thoughts and feelings  Description: Interventions:  - Assess and re-assess patient's level of risk   - Engage patient in 1:1 interactions, daily, for a minimum of 15 minutes   - Encourage patient to express feelings, fears, frustrations, hopes   Outcome: Progressing  Goal: Refrain from harming self  Description: Interventions:  - Monitor patient closely, per order   - Supervise medication ingestion, monitor effects and side effects   Outcome: Progressing  Goal: Refrain from isolation  Description: Interventions:  - Develop a trusting relationship   - Encourage socialization   Outcome: Progressing     Problem: Anxiety  Goal: Anxiety is at manageable level  Description: Interventions:  - Assess and monitor patient's anxiety level.   - Monitor for signs and symptoms (heart palpitations, chest pain, shortness of breath, headaches, nausea, feeling jumpy, restlessness, irritable, apprehensive).   - Collaborate with interdisciplinary team and initiate plan and interventions as ordered.  - [place] patient to unit/surroundings  - Explain treatment plan  - Encourage participation in care  - Encourage verbalization of concerns/fears  - Identify coping mechanisms  - Assist in developing anxiety-reducing skills  - Administer/offer alternative therapies  - Limit or eliminate stimulants  Outcome: Progressing     Problem: Knowledge Deficit  Goal: Patient/family/caregiver demonstrates understanding of disease process, treatment plan, medications, and  discharge instructions  Description: Complete learning assessment and assess knowledge base.  Interventions:  - Provide teaching at level of understanding  - Provide teaching via preferred learning methods  Outcome: Progressing     Problem: SLEEP DISTURBANCE  Goal: Will exhibit normal sleeping pattern  Description: Interventions:  -  Assess the patients sleep pattern, noting recent changes  - Administer medication as ordered  - Decrease environmental stimuli, including noise, as appropriate during the night  - Encourage the patient to actively participate in unit groups and or exercise during the day to enhance ability to achieve adequate sleep at night  - Assess the patient, in the morning, encouraging a description of sleep experience  Outcome: Progressing

## 2024-10-22 PROCEDURE — 99232 SBSQ HOSP IP/OBS MODERATE 35: CPT | Performed by: PSYCHIATRY & NEUROLOGY

## 2024-10-22 RX ADMIN — ARIPIPRAZOLE 5 MG: 5 TABLET ORAL at 08:15

## 2024-10-22 RX ADMIN — SERTRALINE HYDROCHLORIDE 150 MG: 100 TABLET ORAL at 08:15

## 2024-10-22 RX ADMIN — FAMOTIDINE 20 MG: 20 TABLET ORAL at 08:15

## 2024-10-22 RX ADMIN — CHOLECALCIFEROL TAB 25 MCG (1000 UNIT) 2000 UNITS: 25 TAB at 08:15

## 2024-10-22 RX ADMIN — CYANOCOBALAMIN TAB 1000 MCG 1000 MCG: 1000 TAB at 08:15

## 2024-10-22 RX ADMIN — FAMOTIDINE 20 MG: 20 TABLET ORAL at 17:05

## 2024-10-22 RX ADMIN — ATORVASTATIN CALCIUM 10 MG: 10 TABLET, FILM COATED ORAL at 17:06

## 2024-10-22 RX ADMIN — LEVOTHYROXINE SODIUM 37.5 MCG: 125 TABLET ORAL at 06:33

## 2024-10-22 NOTE — PROGRESS NOTES
"Progress Note - Behavioral Health   Name: Franco Roberson 39 y.o. male I MRN: [id number]  Unit/Bed#: -02 I Date of Admission: 5/14/2024   Date of Service: 10/22/2024 I Hospital Day: 161     Assessment & Plan  MDD (major depressive disorder), recurrent severe, without psychosis (HCC)  No medications changes at this time, will continue to monitor and optimize as indicated:  Abilify 5 mg daily as mood adjunct   Zoloft 150 mg daily for depression and anxiety  Continue to encourage participation in group therapy, milieu therapy and occupational therapy.  Continue to assess for side effects of medications.  Continue collaboration with SLIM for medical co-morbidities as indicated.  Continue discussion with CM/SW to assist with obtaining collateral, disposition planning, and the implementation of patient-centered individualized plan of care.  Continue frequent safety checks and vitals per unit protocol.    Risks, benefits and possible side effects of Medications: Risks, benefits, and possible side effects of medications have previously been explained. No new medications at this time.      Legal status: 201    Disposition: to be determined, pending SOAR application for potential group home placement. OT Cognitive Evaluation completed: \"Pt would benefit from discharge to a supportive environment that can provide checks for safety and compliance with IADLs. \"      No associated orders from this encounter found during lookback period of 72 hours.  Autism spectrum disorder  Continue supportive care    No associated orders from this encounter found during lookback period of 72 hours.      Current medications:  Current Facility-Administered Medications   Medication Dose Route Frequency Provider Last Rate    acetaminophen  650 mg Oral Q6H PRN Basilio Brown MD      acetaminophen  650 mg Oral Q4H PRN Basilio Brown MD      acetaminophen  975 mg Oral Q6H PRN Basilio Brown MD      aluminum-magnesium " hydroxide-simethicone  30 mL Oral Q4H PRN Basilio Brown MD      ARIPiprazole  5 mg Oral Daily Basilio Panda MD      Artificial Tears  1 drop Both Eyes Q3H PRN Basilio Brown MD      atorvastatin  10 mg Oral Daily With Dinner WALLY Baptiste      benztropine  1 mg Intramuscular Q4H PRN Max 6/day Basilio Brown MD      benztropine  1 mg Oral Q4H PRN Max 6/day Basilio Brown MD      Cholecalciferol  2,000 Units Oral Daily WALLY Baptiste      cyanocobalamin  1,000 mcg Oral Daily WALLY Baptiste      Diclofenac Sodium  2 g Topical 4x Daily PRN WALLY Baptiste      hydrOXYzine HCL  50 mg Oral Q6H PRN Max 4/day Basilio Brown MD      Or    diphenhydrAMINE  50 mg Intramuscular Q6H PRN Basilio Brown MD      diphenhydrAMINE-zinc acetate   Topical Daily PRN Raj Guerrero MD      famotidine  20 mg Oral BID WALLY Baptiste      hydrOXYzine HCL  100 mg Oral Q6H PRN Max 4/day Basilio Brown MD      Or    LORazepam  2 mg Intramuscular Q6H PRN Basilio Brown MD      hydrOXYzine HCL  25 mg Oral Q6H PRN Max 4/day Basilio Brown MD      levothyroxine  37.5 mcg Oral Early Morning WALLY Baptiste      melatonin  6 mg Oral HS PRN WALLY Gomez      methocarbamol  500 mg Oral Q6H PRN WALLY Baptiste      OLANZapine  5 mg Oral Q4H PRN Max 3/day Basilio Brown MD      Or    OLANZapine  2.5 mg Intramuscular Q4H PRN Max 3/day Basilio Brown MD      OLANZapine  5 mg Oral Q3H PRN Max 3/day Basilio Brown MD      Or    OLANZapine  5 mg Intramuscular Q3H PRN Max 3/day Basilio Brown MD      OLANZapine  2.5 mg Oral Q4H PRN Max 6/day Basilio Brown MD      polyethylene glycol  17 g Oral Daily PRN Basilio Brown MD      propranolol  10 mg Oral Q8H PRN Basilio Brown MD      senna-docusate sodium  1 tablet Oral Daily PRN Basilio Brown MD      sertraline   150 mg Oral Daily WALLY Gomez      traZODone  50 mg Oral HS PRN Basilio Brown MD         Risks / Benefits of Treatment:    Risks, benefits, and possible side effects of medications explained to patient and patient verbalizes understanding and agreement for treatment.    Subjective:    Behavior over the last 24 hours: unchanged.     The patient was evaluated this morning for continuity of care and no acute distress noted throughout the evaluation.  Over the past 24 hours staff noted the patient was visible on the unit, pleasant and calm, denying all psychiatric symptoms.      Patient was visited on unit for continuing care; chart reviewed and discussed with multidisciplinary treatment team.  On approach, the patient was calm and cooperative.  Franco has no concerns at this time, he is staying positive regarding his social security application and is waiting to hear updates from his .  Denied any changes in mood, appetite, and energy level. No problem initiating and maintaining sleep.  Denied A/VH currently.  Denied SI/HI, intent or plan upon direct inquiry at this time.    Patient continues to be visible in the milieu and interacts with staff and peers. No reports of aggression or self-injurious behavior on unit. No PRN medications used in the past 24 hours.    Patient accepted all offered medications and no adverse effects of medications noted or reported.    Sleep: normal  Appetite: normal  Medication side effects: No   ROS: no complaints    Mental Status Examination:  Appearance:  age appropriate, casually dressed, marginal hygiene, looks stated age   Behavior:  pleasant, cooperative, calm   Speech:  normal rate and volume, scant   Mood:  euthymic   Affect:  constricted   Thought Process:  organized, logical, coherent   Associations: concrete associations   Thought Content:  no overt delusions   Perceptual Disturbances: no auditory hallucinations, no visual hallucinations, does not  appear responding to internal stimuli   Risk Potential: Suicidal ideation - None at present  Homicidal ideation - None at present  Potential for aggression - No   Sensorium:  oriented to person, place, and time/date   Memory:  recent and remote memory grossly intact   Consciousness:  alert and awake   Attention/Concentration: attention span and concentration are age appropriate   Insight:  limited   Judgment: limited   Gait/Station: normal gait/station   Motor Activity: no abnormal movements       Vital signs in last 24 hours:    Temp:  [96.4 °F (35.8 °C)-96.5 °F (35.8 °C)] 96.5 °F (35.8 °C)  HR:  [67-73] 67  BP: (117-141)/(61-76) 117/61  SpO2:  [67 %-97 %] 97 %  O2 Device: None (Room air)         Laboratory results: I have personally reviewed all pertinent laboratory/tests results    Most Recent Labs:   Lab Results   Component Value Date    WBC 7.01 06/27/2024    RBC 4.54 06/27/2024    HGB 14.5 06/27/2024    HCT 44.4 06/27/2024     06/27/2024    RDW 12.0 06/27/2024    NEUTROABS 4.01 06/27/2024    SODIUM 139 07/10/2024    K 3.8 07/10/2024     07/10/2024    CO2 27 07/10/2024    BUN 16 07/10/2024    CREATININE 0.92 07/10/2024    GLUC 87 07/10/2024    CALCIUM 9.3 07/10/2024    AST 28 07/10/2024    ALT 36 07/10/2024    ALKPHOS 66 07/10/2024    TP 7.7 07/10/2024    ALB 4.4 07/10/2024    TBILI 0.55 07/10/2024    CHOLESTEROL 168 09/19/2024    HDL 42 09/19/2024    TRIG 137 09/19/2024    LDLCALC 99 09/19/2024    NONHDLC 126 09/19/2024    [id number] 6.241 (H) 09/10/2024    FREET4 0.56 (L) 09/10/2024    SYPHILISAB Non-reactive 05/15/2024       Progress Toward Goals: Franco is currently assessed as being at their baseline with continued need for medication management, supervision for safety and ADL’s. These services are not currently available in a less restrictive environment necessitating continued hospital stay.  Franco should remain on the unit until these services are available, due to likelihood of mental  decompensation and readmission if discharged to an unsupervised setting.  Assertive discharge planning and collaboration with multiple providers (inpatient and community based) remains ongoing.  Franco is currently awaiting for private pay group home versus safe [place]      Counseling / Coordination of Care:    Administrative Statements   I have spent a total time of 20 minutes in caring for this patient on the day of the visit/encounter including Counseling / Coordination of care, Documenting in the medical record, and Obtaining or reviewing history  .    Lita Knapp,  10/22/24    This note was completed in part utilizing Dragon dictation Software. Grammatical, translation, syntax errors, random word insertions, spelling mistakes, and incomplete sentences may be an occasional consequence of this system secondary to software limitations with voice recognition, ambient noise, and hardware issues. If you have any questions or concerns about the content, text, or information contained within the body of this dictation, please contact the provider for clarification.

## 2024-10-22 NOTE — SOCIAL WORK
Pt reported that he was able to speak with his friend and they reported Pt cannot use address for mailing.

## 2024-10-22 NOTE — NURSING NOTE
"Pt is visible on the unit and social with select peers. Pt reports he slept \"on and off\" the previous night. Denies SI/HI/AH/VH but does appear depressed with poor eye contact. Med and meal compliant. Denies any unmet needs or complaints at this time.   "

## 2024-10-22 NOTE — NURSING NOTE
Patient has been visible on the unit walking in the halls.  He is pleasant and calm.  Patient denies all signs and symptoms.

## 2024-10-22 NOTE — PLAN OF CARE
Problem: Ineffective Coping  Goal: Participates in unit activities  Description: Interventions:  - Provide therapeutic environment   - Provide required programming   - Redirect inappropriate behaviors   Outcome: Progressing     Problem: Depression  Goal: Treatment Goal: Demonstrate behavioral control of depressive symptoms, verbalize feelings of improved mood/affect, and adopt new coping skills prior to discharge  Outcome: Progressing  Goal: Verbalize thoughts and feelings  Description: Interventions:  - Assess and re-assess patient's level of risk   - Engage patient in 1:1 interactions, daily, for a minimum of 15 minutes   - Encourage patient to express feelings, fears, frustrations, hopes   Outcome: Progressing  Goal: Refrain from harming self  Description: Interventions:  - Monitor patient closely, per order   - Supervise medication ingestion, monitor effects and side effects   Outcome: Progressing  Goal: Refrain from isolation  Description: Interventions:  - Develop a trusting relationship   - Encourage socialization   Outcome: Progressing     Problem: Anxiety  Goal: Anxiety is at manageable level  Description: Interventions:  - Assess and monitor patient's anxiety level.   - Monitor for signs and symptoms (heart palpitations, chest pain, shortness of breath, headaches, nausea, feeling jumpy, restlessness, irritable, apprehensive).   - Collaborate with interdisciplinary team and initiate plan and interventions as ordered.  - [place] patient to unit/surroundings  - Explain treatment plan  - Encourage participation in care  - Encourage verbalization of concerns/fears  - Identify coping mechanisms  - Assist in developing anxiety-reducing skills  - Administer/offer alternative therapies  - Limit or eliminate stimulants  Outcome: Progressing     Problem: DISCHARGE PLANNING - CARE MANAGEMENT  Goal: Discharge to post-acute care or home with appropriate resources  Description: INTERVENTIONS:  - Conduct assessment to  determine patient/family and health care team treatment goals, and need for post-acute services based on payer coverage, community resources, and patient preferences, and barriers to discharge  - Address psychosocial, clinical, and financial barriers to discharge as identified in assessment in conjunction with the patient/family and health care team  - Arrange appropriate level of post-acute services according to patient’s   needs and preference and payer coverage in collaboration with the physician and health care team  - Communicate with and update the patient/family, physician, and health care team regarding progress on the discharge plan  - Arrange appropriate transportation to post-acute venues  Outcome: Progressing     Problem: Knowledge Deficit  Goal: Patient/family/caregiver demonstrates understanding of disease process, treatment plan, medications, and discharge instructions  Description: Complete learning assessment and assess knowledge base.  Interventions:  - Provide teaching at level of understanding  - Provide teaching via preferred learning methods  Outcome: Progressing     Problem: SLEEP DISTURBANCE  Goal: Will exhibit normal sleeping pattern  Description: Interventions:  -  Assess the patients sleep pattern, noting recent changes  - Administer medication as ordered  - Decrease environmental stimuli, including noise, as appropriate during the night  - Encourage the patient to actively participate in unit groups and or exercise during the day to enhance ability to achieve adequate sleep at night  - Assess the patient, in the morning, encouraging a description of sleep experience  Outcome: Progressing

## 2024-10-23 PROCEDURE — 99232 SBSQ HOSP IP/OBS MODERATE 35: CPT | Performed by: PSYCHIATRY & NEUROLOGY

## 2024-10-23 RX ADMIN — CHOLECALCIFEROL TAB 25 MCG (1000 UNIT) 2000 UNITS: 25 TAB at 08:27

## 2024-10-23 RX ADMIN — LEVOTHYROXINE SODIUM 37.5 MCG: 125 TABLET ORAL at 06:33

## 2024-10-23 RX ADMIN — FAMOTIDINE 20 MG: 20 TABLET ORAL at 08:27

## 2024-10-23 RX ADMIN — FAMOTIDINE 20 MG: 20 TABLET ORAL at 17:09

## 2024-10-23 RX ADMIN — ARIPIPRAZOLE 5 MG: 5 TABLET ORAL at 08:27

## 2024-10-23 RX ADMIN — CYANOCOBALAMIN TAB 1000 MCG 1000 MCG: 1000 TAB at 08:27

## 2024-10-23 RX ADMIN — ATORVASTATIN CALCIUM 10 MG: 10 TABLET, FILM COATED ORAL at 17:09

## 2024-10-23 RX ADMIN — SERTRALINE HYDROCHLORIDE 150 MG: 100 TABLET ORAL at 08:27

## 2024-10-23 NOTE — NURSING NOTE
Pt is withdrawn to room resting in bed. Denies SI/HI/AH/VH but does appear depressed. Encouraged to attend groups today and pt states he plans to try and go to some. Medication and meal compliant. Denies any unmet needs or complaints at this time.

## 2024-10-23 NOTE — PROGRESS NOTES
10/23/24 1139   Team Meeting   Meeting Type Daily Rounds   Team Members Present   Team Members Present Physician;Nurse;   Physician Team Member Aria   Nursing Team Member [facility]   Care Management Team Member Noa   Patient/Family Present   Patient Present No   Patient's Family Present No     Pt is calm and cooperative. Pt is medication and meal compliant. Pt is seclusive to his room. Pt is social with select peers and staff. Pt's discharge is pending placement.

## 2024-10-23 NOTE — NURSING NOTE
Patient is visible on unit, walking in the hallway, limited interaction with peers and staff. Patient presents with sad mood and flat affect. Patient is calm and cooperative, denies SI/HI/AVH. No complaints or unmet needs identified at this time. Q 15 minute safety checks maintained.

## 2024-10-23 NOTE — PLAN OF CARE
Problem: Ineffective Coping  Goal: Participates in unit activities  Description: Interventions:  - Provide therapeutic environment   - Provide required programming   - Redirect inappropriate behaviors   Outcome: Progressing     Problem: Depression  Goal: Treatment Goal: Demonstrate behavioral control of depressive symptoms, verbalize feelings of improved mood/affect, and adopt new coping skills prior to discharge  Outcome: Progressing  Goal: Verbalize thoughts and feelings  Description: Interventions:  - Assess and re-assess patient's level of risk   - Engage patient in 1:1 interactions, daily, for a minimum of 15 minutes   - Encourage patient to express feelings, fears, frustrations, hopes   Outcome: Progressing  Goal: Refrain from harming self  Description: Interventions:  - Monitor patient closely, per order   - Supervise medication ingestion, monitor effects and side effects   Outcome: Progressing  Goal: Refrain from isolation  Description: Interventions:  - Develop a trusting relationship   - Encourage socialization   Outcome: Progressing     Problem: Anxiety  Goal: Anxiety is at manageable level  Description: Interventions:  - Assess and monitor patient's anxiety level.   - Monitor for signs and symptoms (heart palpitations, chest pain, shortness of breath, headaches, nausea, feeling jumpy, restlessness, irritable, apprehensive).   - Collaborate with interdisciplinary team and initiate plan and interventions as ordered.  - [place] patient to unit/surroundings  - Explain treatment plan  - Encourage participation in care  - Encourage verbalization of concerns/fears  - Identify coping mechanisms  - Assist in developing anxiety-reducing skills  - Administer/offer alternative therapies  - Limit or eliminate stimulants  Outcome: Progressing     Problem: Knowledge Deficit  Goal: Patient/family/caregiver demonstrates understanding of disease process, treatment plan, medications, and discharge  instructions  Description: Complete learning assessment and assess knowledge base.  Interventions:  - Provide teaching at level of understanding  - Provide teaching via preferred learning methods  Outcome: Progressing     Problem: SLEEP DISTURBANCE  Goal: Will exhibit normal sleeping pattern  Description: Interventions:  -  Assess the patients sleep pattern, noting recent changes  - Administer medication as ordered  - Decrease environmental stimuli, including noise, as appropriate during the night  - Encourage the patient to actively participate in unit groups and or exercise during the day to enhance ability to achieve adequate sleep at night  - Assess the patient, in the morning, encouraging a description of sleep experience  Outcome: Progressing

## 2024-10-23 NOTE — PROGRESS NOTES
"Progress Note - Behavioral Health   Name: Franco Roberson 39 y.o. male I MRN: [id number]  Unit/Bed#: -02 I Date of Admission: 5/14/2024   Date of Service: 10/23/2024 I Hospital Day: 162     Assessment & Plan  MDD (major depressive disorder), recurrent severe, without psychosis (HCC)  No medications changes at this time, will continue to monitor and optimize as indicated:  Abilify 5 mg daily as mood adjunct   Zoloft 150 mg daily for depression and anxiety  Continue to encourage participation in group therapy, milieu therapy and occupational therapy.  Continue to assess for side effects of medications.  Continue collaboration with SLIM for medical co-morbidities as indicated.  Continue discussion with CM/SW to assist with obtaining collateral, disposition planning, and the implementation of patient-centered individualized plan of care.  Continue frequent safety checks and vitals per unit protocol.    Risks, benefits and possible side effects of Medications: Risks, benefits, and possible side effects of medications have previously been explained. No new medications at this time.      Legal status: 201    Disposition: to be determined, pending SOAR application for potential group home placement. OT Cognitive Evaluation completed: \"Pt would benefit from discharge to a supportive environment that can provide checks for safety and compliance with IADLs. \"      No associated orders from this encounter found during lookback period of 72 hours.  Autism spectrum disorder  Continue supportive care    No associated orders from this encounter found during lookback period of 72 hours.      Current medications:  Current Facility-Administered Medications   Medication Dose Route Frequency Provider Last Rate    acetaminophen  650 mg Oral Q6H PRN Basilio Brown MD      acetaminophen  650 mg Oral Q4H PRN Basilio Brown MD      acetaminophen  975 mg Oral Q6H PRN Basilio Brown MD      aluminum-magnesium " hydroxide-simethicone  30 mL Oral Q4H PRN Basilio Brown MD      ARIPiprazole  5 mg Oral Daily Basilio Panda MD      Artificial Tears  1 drop Both Eyes Q3H PRN Basilio Brown MD      atorvastatin  10 mg Oral Daily With Dinner WALLY Baptiste      benztropine  1 mg Intramuscular Q4H PRN Max 6/day Basilio Brown MD      benztropine  1 mg Oral Q4H PRN Max 6/day Basilio Brown MD      Cholecalciferol  2,000 Units Oral Daily WALLY Baptiste      cyanocobalamin  1,000 mcg Oral Daily WALLY Baptiste      Diclofenac Sodium  2 g Topical 4x Daily PRN WALLY Baptiste      hydrOXYzine HCL  50 mg Oral Q6H PRN Max 4/day Basilio Brown MD      Or    diphenhydrAMINE  50 mg Intramuscular Q6H PRN Basilio Brown MD      diphenhydrAMINE-zinc acetate   Topical Daily PRN Raj Guerrero MD      famotidine  20 mg Oral BID WALLY Baptiste      hydrOXYzine HCL  100 mg Oral Q6H PRN Max 4/day Basilio Brown MD      Or    LORazepam  2 mg Intramuscular Q6H PRN Basilio Brown MD      hydrOXYzine HCL  25 mg Oral Q6H PRN Max 4/day Basilio Brown MD      levothyroxine  37.5 mcg Oral Early Morning WALLY Baptiste      melatonin  6 mg Oral HS PRN WALLY Gomez      methocarbamol  500 mg Oral Q6H PRN WALLY Baptiste      OLANZapine  5 mg Oral Q4H PRN Max 3/day Basilio Brown MD      Or    OLANZapine  2.5 mg Intramuscular Q4H PRN Max 3/day Basilio Brown MD      OLANZapine  5 mg Oral Q3H PRN Max 3/day Basilio Brown MD      Or    OLANZapine  5 mg Intramuscular Q3H PRN Max 3/day Basilio Brown MD      OLANZapine  2.5 mg Oral Q4H PRN Max 6/day Basilio Brown MD      polyethylene glycol  17 g Oral Daily PRN Basilio Brown MD      propranolol  10 mg Oral Q8H PRN Basilio Brown MD      senna-docusate sodium  1 tablet Oral Daily PRN Basilio Brown MD      sertraline   150 mg Oral Daily WALLY Gomez      traZODone  50 mg Oral HS PRN Basilio Brown MD         Risks / Benefits of Treatment:    Risks, benefits, and possible side effects of medications explained to patient and patient verbalizes understanding and agreement for treatment.    Subjective:    Behavior over the last 24 hours: unchanged.     The patient was evaluated this morning for continuity of care and no acute distress noted throughout the evaluation.  Over the past 24 hours staff noted the patient was calm, cooperative, no negative symptoms reported.      Patient was visited on unit for continuing care; chart reviewed and discussed with multidisciplinary treatment team.  On approach, the patient was calm, pleasant and cooperative. Observed resting in bed, he denies having any symptoms of anxiety or depression. Denied any changes in mood, appetite, and energy level. No problem initiating and maintaining sleep.  Denied A/VH currently.  Denied SI/HI, intent or plan upon direct inquiry at this time.    Patient continues to be selectively interactive with staff and peers. No reports of aggression or self-injurious behavior on unit. No PRN medications used in the past 24 hours.    Patient accepted all offered medications and no adverse effects of medications noted or reported.    Sleep: normal  Appetite: normal  Medication side effects: No   ROS: no complaints    Mental Status Examination:  Appearance:  age appropriate, casually dressed, marginal hygiene, looks stated age   Behavior:  pleasant, cooperative, calm   Speech:  normal rate and volume   Mood:  euthymic   Affect:  constricted   Thought Process:  logical, coherent, goal directed   Associations: concrete associations   Thought Content:  no overt delusions   Perceptual Disturbances: no auditory hallucinations, no visual hallucinations, does not appear responding to internal stimuli   Risk Potential: Suicidal ideation - None at present  Homicidal  ideation - None at present  Potential for aggression - No   Sensorium:  oriented to person, place, and time/date   Memory:  recent and remote memory grossly intact   Consciousness:  alert and awake   Attention/Concentration: attention span and concentration are age appropriate   Insight:  limited   Judgment: limited   Gait/Station: normal gait/station   Motor Activity: no abnormal movements       Vital signs in last 24 hours:    Temp:  [96.4 °F (35.8 °C)-98.1 °F (36.7 °C)] 96.4 °F (35.8 °C)  HR:  [71-74] 71  BP: (114-122)/(69-71) 114/71  Resp:  [16] 16  SpO2:  [97 %-98 %] 98 %  O2 Device: None (Room air)         Laboratory results: I have personally reviewed all pertinent laboratory/tests results    Most Recent Labs:   Lab Results   Component Value Date    WBC 7.01 06/27/2024    RBC 4.54 06/27/2024    HGB 14.5 06/27/2024    HCT 44.4 06/27/2024     06/27/2024    RDW 12.0 06/27/2024    NEUTROABS 4.01 06/27/2024    SODIUM 139 07/10/2024    K 3.8 07/10/2024     07/10/2024    CO2 27 07/10/2024    BUN 16 07/10/2024    CREATININE 0.92 07/10/2024    GLUC 87 07/10/2024    CALCIUM 9.3 07/10/2024    AST 28 07/10/2024    ALT 36 07/10/2024    ALKPHOS 66 07/10/2024    TP 7.7 07/10/2024    ALB 4.4 07/10/2024    TBILI 0.55 07/10/2024    CHOLESTEROL 168 09/19/2024    HDL 42 09/19/2024    TRIG 137 09/19/2024    LDLCALC 99 09/19/2024    NONHDLC 126 09/19/2024    HVT1JXKYCCHO 6.241 (H) 09/10/2024    FREET4 0.56 (L) 09/10/2024    SYPHILISAB Non-reactive 05/15/2024       Progress Toward Goals: Franco is currently assessed as being at their baseline with continued need for medication management, supervision for safety and ADL’s. These services are not currently available in a less restrictive environment necessitating continued hospital stay.  Franco should remain on the unit until these services are available, due to likelihood of mental decompensation and readmission if discharged to an unsupervised setting.  Assertive  discharge planning and collaboration with multiple providers (inpatient and community based) remains ongoing.  Franco is currently awaiting for private pay group home vs [facility].      Counseling / Coordination of Care:    Administrative Statements   I have spent a total time of 25 minutes in caring for this patient on the day of the visit/encounter including Counseling / Coordination of care, Documenting in the medical record, and Obtaining or reviewing history  .    Lita Knapp,  10/23/24    This note was completed in part utilizing Dragon dictation Software. Grammatical, translation, syntax errors, random word insertions, spelling mistakes, and incomplete sentences may be an occasional consequence of this system secondary to software limitations with voice recognition, ambient noise, and hardware issues. If you have any questions or concerns about the content, text, or information contained within the body of this dictation, please contact the provider for clarification.

## 2024-10-23 NOTE — PROGRESS NOTES
10/23/24 1161   Team Meeting   Meeting Type Daily Rounds   Team Members Present   Team Members Present Physician;Nurse;   Physician Team Member Aria   Nursing Team Member [facility] Management Team Member Noa   Patient/Family Present   Patient Present No   Patient's Family Present No     Pt is medication and meal compliant. Pt denies SI/HI/AVH. Pt's discharge is pending placement.

## 2024-10-24 PROCEDURE — 99232 SBSQ HOSP IP/OBS MODERATE 35: CPT | Performed by: PSYCHIATRY & NEUROLOGY

## 2024-10-24 RX ADMIN — FAMOTIDINE 20 MG: 20 TABLET ORAL at 17:05

## 2024-10-24 RX ADMIN — LEVOTHYROXINE SODIUM 37.5 MCG: 125 TABLET ORAL at 06:32

## 2024-10-24 RX ADMIN — ATORVASTATIN CALCIUM 10 MG: 10 TABLET, FILM COATED ORAL at 17:05

## 2024-10-24 RX ADMIN — FAMOTIDINE 20 MG: 20 TABLET ORAL at 08:16

## 2024-10-24 RX ADMIN — ARIPIPRAZOLE 5 MG: 5 TABLET ORAL at 08:16

## 2024-10-24 RX ADMIN — CYANOCOBALAMIN TAB 1000 MCG 1000 MCG: 1000 TAB at 08:16

## 2024-10-24 RX ADMIN — SERTRALINE HYDROCHLORIDE 150 MG: 100 TABLET ORAL at 08:16

## 2024-10-24 RX ADMIN — CHOLECALCIFEROL TAB 25 MCG (1000 UNIT) 2000 UNITS: 25 TAB at 08:16

## 2024-10-24 NOTE — NURSING NOTE
Pt is withdrawn to room, slow to respond with poor eye contact. Pt denies SI/HI/AH/VH but appears depressed. Pt reports he was up and down frequently the previous night. Med and meal compliant. Encouraged to attend groups and come out to interact with the milieu, pt declined. Denies any unmet needs or complaints at this time.

## 2024-10-24 NOTE — SOCIAL WORK
Cm met with Pt. Cm and Pt discussed Cm calling the friend to explain using their address just for mailing and nothing further. Pt explained that the Friend's Father is the owner of the property and is addiment when he makes a decision and will not change his mind.   Cm and Pt discussed the idea of a PO Box.  Pt is open to the idea.   Pt reported that he goes through waves of feeling up and down. Pt reported he is feeling in the middle currently. Pt was observed going to group.

## 2024-10-24 NOTE — PROGRESS NOTES
10/24/24 0867   Team Meeting   Meeting Type Daily Rounds   Team Members Present   Team Members Present Physician;Nurse;   Physician Team Member Aria   Nursing Team Member [facility]   Care Management Team Member Noa   Patient/Family Present   Patient Present No   Patient's Family Present No     Pt is medication and meal compliant. Pt is calm and cooperative. Pt's discharge is pending placement.

## 2024-10-24 NOTE — NURSING NOTE
Pt is withdrawn from room, writing a letter. Pt appears depressed but was able to hold a conversation with this RN. Pt does not have any medication at this time but did come out for snack. Denies SI, HI, AH, and VH. Denies any needs at this time.

## 2024-10-24 NOTE — PROGRESS NOTES
"Progress Note - Behavioral Health   Name: Franco Roberson 39 y.o. male I MRN: [id number]  Unit/Bed#: -02 I Date of Admission: 5/14/2024   Date of Service: 10/24/2024 I Hospital Day: 163     Assessment & Plan  MDD (major depressive disorder), recurrent severe, without psychosis (HCC)  No medications changes at this time, will continue to monitor and optimize as indicated:  Abilify 5 mg daily as mood adjunct   Zoloft 150 mg daily for depression and anxiety  Continue to encourage participation in group therapy, milieu therapy and occupational therapy.  Continue to assess for side effects of medications.  Continue collaboration with [facility] for medical co-morbidities as indicated.  Continue discussion with CM/SW to assist with obtaining collateral, disposition planning, and the implementation of patient-centered individualized plan of care.  Continue frequent safety checks and vitals per unit protocol.    Risks, benefits and possible side effects of Medications: Risks, benefits, and possible side effects of medications have previously been explained. No new medications at this time.      Legal status: 201    Disposition: to be determined, pending SOAR application for potential group home placement. OT Cognitive Evaluation completed: \"Pt would benefit from discharge to a supportive environment that can provide checks for safety and compliance with IADLs. \"      No associated orders from this encounter found during lookback period of 72 hours.  Autism spectrum disorder  Continue supportive care    No associated orders from this encounter found during lookback period of 72 hours.      Current medications:  Current Facility-Administered Medications   Medication Dose Route Frequency Provider Last Rate    acetaminophen  650 mg Oral Q6H PRN Basilio Brown MD      acetaminophen  650 mg Oral Q4H PRN Basilio Brown MD      acetaminophen  975 mg Oral Q6H PRN Basilio Brown MD      aluminum-magnesium " hydroxide-simethicone  30 mL Oral Q4H PRN Basilio Brown MD      ARIPiprazole  5 mg Oral Daily Basilio Panda MD      Artificial Tears  1 drop Both Eyes Q3H PRN Basilio Brown MD      atorvastatin  10 mg Oral Daily With Dinner WALLY Baptiste      benztropine  1 mg Intramuscular Q4H PRN Max 6/day Basilio Brown MD      benztropine  1 mg Oral Q4H PRN Max 6/day Basilio Brown MD      Cholecalciferol  2,000 Units Oral Daily WALLY Baptiste      cyanocobalamin  1,000 mcg Oral Daily WALLY Baptiste      Diclofenac Sodium  2 g Topical 4x Daily PRN WALLY Baptiste      hydrOXYzine HCL  50 mg Oral Q6H PRN Max 4/day Basilio Brown MD      Or    diphenhydrAMINE  50 mg Intramuscular Q6H PRN Basilio Brown MD      diphenhydrAMINE-zinc acetate   Topical Daily PRN Raj Guerrero MD      famotidine  20 mg Oral BID WALLY Baptiste      hydrOXYzine HCL  100 mg Oral Q6H PRN Max 4/day Basilio Brown MD      Or    LORazepam  2 mg Intramuscular Q6H PRN Basilio Brown MD      hydrOXYzine HCL  25 mg Oral Q6H PRN Max 4/day Basilio Brown MD      levothyroxine  37.5 mcg Oral Early Morning WALLY Baptiste      melatonin  6 mg Oral HS PRN WALLY Gomez      methocarbamol  500 mg Oral Q6H PRN WALLY Baptiste      OLANZapine  5 mg Oral Q4H PRN Max 3/day Basilio Brown MD      Or    OLANZapine  2.5 mg Intramuscular Q4H PRN Max 3/day Basilio Brown MD      OLANZapine  5 mg Oral Q3H PRN Max 3/day Basilio Brown MD      Or    OLANZapine  5 mg Intramuscular Q3H PRN Max 3/day Basilio Brown MD      OLANZapine  2.5 mg Oral Q4H PRN Max 6/day Basilio Brown MD      polyethylene glycol  17 g Oral Daily PRN Basilio Brown MD      propranolol  10 mg Oral Q8H PRN Basilio Brown MD      senna-docusate sodium  1 tablet Oral Daily PRN Basilio Brown MD      sertraline   150 mg Oral Daily WALLY Gomez      traZODone  50 mg Oral HS PRN Basilio Brown MD         Risks / Benefits of Treatment:    Risks, benefits, and possible side effects of medications explained to patient and patient verbalizes understanding and agreement for treatment.    Subjective:    Behavior over the last 24 hours: unchanged.     The patient was evaluated this morning for continuity of care and no acute distress noted throughout the evaluation.  Over the past 24 hours staff noted the patient was withdrawn to room and was observed writing a letter. He was visible for meals and snacks. Staff report that pt appears depressed.      Patient was visited on unit for continuing care; chart reviewed and discussed with multidisciplinary treatment team.  On approach, the patient was reporting that he has been spending more time in his bedroom due to the length of stay.  He reports that he could not sleep well last night and has been sad about his length of stay.  He states that he is still hopeful that his placement will not take too long. Denied any changes in mood, appetite, and energy level. No problem initiating and maintaining sleep.  Denied A/VH currently.  Denied SI/HI, intent or plan upon direct inquiry at this time.    Patient continues to be visible in the milieu and interacts with staff and peers. No reports of aggression or self-injurious behavior on unit. No PRN medications used in the past 24 hours.    Patient accepted all offered medications and no adverse effects of medications noted or reported.    Sleep: normal  Appetite: normal  Medication side effects: No   ROS: no complaints    Mental Status Examination:  Appearance:  age appropriate, casually dressed, marginal hygiene, looks stated age   Behavior:  pleasant, cooperative, calm   Speech:  normal rate and volume   Mood:  dysphoric   Affect:  constricted   Thought Process:  organized, linear   Associations: concrete associations    Thought Content:  no overt delusions   Perceptual Disturbances: no auditory hallucinations, no visual hallucinations, does not appear responding to internal stimuli   Risk Potential: Suicidal ideation - None at present  Homicidal ideation - None at present  Potential for aggression - No   Sensorium:  oriented to person, place, and time/date   Memory:  recent and remote memory grossly intact   Consciousness:  alert and awake   Attention/Concentration: attention span and concentration are age appropriate   Insight:  limited   Judgment: limited   Gait/Station: normal gait/station   Motor Activity: no abnormal movements       Vital signs in last 24 hours:    Temp:  [96.8 °F (36 °C)-97.8 °F (36.6 °C)] 97.8 °F (36.6 °C)  HR:  [72-80] 72  BP: (109)/(67-72) 109/67  Resp:  [16] 16  SpO2:  [96 %] 96 %  O2 Device: None (Room air)         Laboratory results: I have personally reviewed all pertinent laboratory/tests results    Most Recent Labs:   Lab Results   Component Value Date    WBC 7.01 06/27/2024    RBC 4.54 06/27/2024    HGB 14.5 06/27/2024    HCT 44.4 06/27/2024     06/27/2024    RDW 12.0 06/27/2024    NEUTROABS 4.01 06/27/2024    SODIUM 139 07/10/2024    K 3.8 07/10/2024     07/10/2024    CO2 27 07/10/2024    BUN 16 07/10/2024    CREATININE 0.92 07/10/2024    GLUC 87 07/10/2024    CALCIUM 9.3 07/10/2024    AST 28 07/10/2024    ALT 36 07/10/2024    ALKPHOS 66 07/10/2024    TP 7.7 07/10/2024    ALB 4.4 07/10/2024    TBILI 0.55 07/10/2024    CHOLESTEROL 168 09/19/2024    HDL 42 09/19/2024    TRIG 137 09/19/2024    LDLCALC 99 09/19/2024    NONHDLC 126 09/19/2024    OVV0VHDPOXBL 6.241 (H) 09/10/2024    FREET4 0.56 (L) 09/10/2024    SYPHILISAB Non-reactive 05/15/2024       Progress Toward Goals: Franco is currently assessed as being at their baseline with continued need for medication management, supervision for safety and ADL’s. These services are not currently available in a less restrictive environment  necessitating continued hospital stay.  Franco should remain on the unit until these services are available, due to likelihood of mental decompensation and readmission if discharged to an unsupervised setting.  Assertive discharge planning and collaboration with multiple providers (inpatient and community based) remains ongoing.  Franco is currently awaiting for private pay group [place] vs [facility].     Counseling / Coordination of Care:    Administrative Statements   I have spent a total time of 25 minutes in caring for this patient on the day of the visit/encounter including Counseling / Coordination of care, Documenting in the medical record, and Obtaining or reviewing history  .    Lita Knapp,  10/24/24    This note was completed in part utilizing Dragon dictation Software. Grammatical, translation, syntax errors, random word insertions, spelling mistakes, and incomplete sentences may be an occasional consequence of this system secondary to software limitations with voice recognition, ambient noise, and hardware issues. If you have any questions or concerns about the content, text, or information contained within the body of this dictation, please contact the provider for clarification.

## 2024-10-24 NOTE — PLAN OF CARE
Problem: Depression  Goal: Refrain from harming self  Description: Interventions:  - Monitor patient closely, per order   - Supervise medication ingestion, monitor effects and side effects   Outcome: Progressing     Problem: DISCHARGE PLANNING - CARE MANAGEMENT  Goal: Discharge to post-acute care or home with appropriate resources  Description: INTERVENTIONS:  - Conduct assessment to determine patient/family and health care team treatment goals, and need for post-acute services based on payer coverage, community resources, and patient preferences, and barriers to discharge  - Address psychosocial, clinical, and financial barriers to discharge as identified in assessment in conjunction with the patient/family and health care team  - Arrange appropriate level of post-acute services according to patient’s   needs and preference and payer coverage in collaboration with the physician and health care team  - Communicate with and update the patient/family, physician, and health care team regarding progress on the discharge plan  - Arrange appropriate transportation to post-acute venues  Outcome: Progressing     Problem: Knowledge Deficit  Goal: Patient/family/caregiver demonstrates understanding of disease process, treatment plan, medications, and discharge instructions  Description: Complete learning assessment and assess knowledge base.  Interventions:  - Provide teaching at level of understanding  - Provide teaching via preferred learning methods  Outcome: Progressing     Problem: SLEEP DISTURBANCE  Goal: Will exhibit normal sleeping pattern  Description: Interventions:  -  Assess the patients sleep pattern, noting recent changes  - Administer medication as ordered  - Decrease environmental stimuli, including noise, as appropriate during the night  - Encourage the patient to actively participate in unit groups and or exercise during the day to enhance ability to achieve adequate sleep at night  - Assess the patient,  in the morning, encouraging a description of sleep experience  Outcome: Progressing     Problem: Ineffective Coping  Goal: Participates in unit activities  Description: Interventions:  - Provide therapeutic environment   - Provide required programming   - Redirect inappropriate behaviors   Outcome: Not Progressing     Problem: Depression  Goal: Treatment Goal: Demonstrate behavioral control of depressive symptoms, verbalize feelings of improved mood/affect, and adopt new coping skills prior to discharge  Outcome: Not Progressing  Goal: Verbalize thoughts and feelings  Description: Interventions:  - Assess and re-assess patient's level of risk   - Engage patient in 1:1 interactions, daily, for a minimum of 15 minutes   - Encourage patient to express feelings, fears, frustrations, hopes   Outcome: Not Progressing  Goal: Refrain from isolation  Description: Interventions:  - Develop a trusting relationship   - Encourage socialization   Outcome: Not Progressing     Problem: Anxiety  Goal: Anxiety is at manageable level  Description: Interventions:  - Assess and monitor patient's anxiety level.   - Monitor for signs and symptoms (heart palpitations, chest pain, shortness of breath, headaches, nausea, feeling jumpy, restlessness, irritable, apprehensive).   - Collaborate with interdisciplinary team and initiate plan and interventions as ordered.  - [place] patient to unit/surroundings  - Explain treatment plan  - Encourage participation in care  - Encourage verbalization of concerns/fears  - Identify coping mechanisms  - Assist in developing anxiety-reducing skills  - Administer/offer alternative therapies  - Limit or eliminate stimulants  Outcome: Not Progressing

## 2024-10-25 PROCEDURE — 99232 SBSQ HOSP IP/OBS MODERATE 35: CPT | Performed by: PSYCHIATRY & NEUROLOGY

## 2024-10-25 RX ADMIN — FAMOTIDINE 20 MG: 20 TABLET ORAL at 17:16

## 2024-10-25 RX ADMIN — SERTRALINE HYDROCHLORIDE 150 MG: 100 TABLET ORAL at 08:27

## 2024-10-25 RX ADMIN — ARIPIPRAZOLE 5 MG: 5 TABLET ORAL at 08:27

## 2024-10-25 RX ADMIN — FAMOTIDINE 20 MG: 20 TABLET ORAL at 08:27

## 2024-10-25 RX ADMIN — LEVOTHYROXINE SODIUM 37.5 MCG: 125 TABLET ORAL at 06:22

## 2024-10-25 RX ADMIN — ATORVASTATIN CALCIUM 10 MG: 10 TABLET, FILM COATED ORAL at 17:16

## 2024-10-25 RX ADMIN — CYANOCOBALAMIN TAB 1000 MCG 1000 MCG: 1000 TAB at 08:27

## 2024-10-25 RX ADMIN — CHOLECALCIFEROL TAB 25 MCG (1000 UNIT) 2000 UNITS: 25 TAB at 08:27

## 2024-10-25 NOTE — PLAN OF CARE
Problem: Depression  Goal: Refrain from harming self  Description: Interventions:  - Monitor patient closely, per order   - Supervise medication ingestion, monitor effects and side effects   Outcome: Progressing     Problem: Anxiety  Goal: Anxiety is at manageable level  Description: Interventions:  - Assess and monitor patient's anxiety level.   - Monitor for signs and symptoms (heart palpitations, chest pain, shortness of breath, headaches, nausea, feeling jumpy, restlessness, irritable, apprehensive).   - Collaborate with interdisciplinary team and initiate plan and interventions as ordered.  - [place] patient to unit/surroundings  - Explain treatment plan  - Encourage participation in care  - Encourage verbalization of concerns/fears  - Identify coping mechanisms  - Assist in developing anxiety-reducing skills  - Administer/offer alternative therapies  - Limit or eliminate stimulants  Outcome: Progressing     Problem: DISCHARGE PLANNING - CARE MANAGEMENT  Goal: Discharge to post-acute care or home with appropriate resources  Description: INTERVENTIONS:  - Conduct assessment to determine patient/family and health care team treatment goals, and need for post-acute services based on payer coverage, community resources, and patient preferences, and barriers to discharge  - Address psychosocial, clinical, and financial barriers to discharge as identified in assessment in conjunction with the patient/family and health care team  - Arrange appropriate level of post-acute services according to patient’s   needs and preference and payer coverage in collaboration with the physician and health care team  - Communicate with and update the patient/family, physician, and health care team regarding progress on the discharge plan  - Arrange appropriate transportation to post-acute venues  Outcome: Progressing     Problem: Knowledge Deficit  Goal: Patient/family/caregiver demonstrates understanding of disease process, treatment  plan, medications, and discharge instructions  Description: Complete learning assessment and assess knowledge base.  Interventions:  - Provide teaching at level of understanding  - Provide teaching via preferred learning methods  Outcome: Progressing     Problem: SLEEP DISTURBANCE  Goal: Will exhibit normal sleeping pattern  Description: Interventions:  -  Assess the patients sleep pattern, noting recent changes  - Administer medication as ordered  - Decrease environmental stimuli, including noise, as appropriate during the night  - Encourage the patient to actively participate in unit groups and or exercise during the day to enhance ability to achieve adequate sleep at night  - Assess the patient, in the morning, encouraging a description of sleep experience  Outcome: Progressing     Problem: Ineffective Coping  Goal: Participates in unit activities  Description: Interventions:  - Provide therapeutic environment   - Provide required programming   - Redirect inappropriate behaviors   Outcome: Not Progressing     Problem: Depression  Goal: Treatment Goal: Demonstrate behavioral control of depressive symptoms, verbalize feelings of improved mood/affect, and adopt new coping skills prior to discharge  Outcome: Not Progressing  Goal: Verbalize thoughts and feelings  Description: Interventions:  - Assess and re-assess patient's level of risk   - Engage patient in 1:1 interactions, daily, for a minimum of 15 minutes   - Encourage patient to express feelings, fears, frustrations, hopes   Outcome: Not Progressing  Goal: Refrain from isolation  Description: Interventions:  - Develop a trusting relationship   - Encourage socialization   Outcome: Not Progressing

## 2024-10-25 NOTE — NURSING NOTE
Pt is isolative to room and self, able to make needs known. Presents with a flat affect. Denies any SI/HI/AVH at this time. Pt is adherent with unit routines and care. Currently denies any unmet needs. Q15 safety checks maintained.

## 2024-10-25 NOTE — PROGRESS NOTES
"Progress Note - Behavioral Health   Name: Franco Roberson 39 y.o. male I MRN: [id number]  Unit/Bed#: -02 I Date of Admission: 5/14/2024   Date of Service: 10/25/2024 I Hospital Day: 164     Assessment & Plan  MDD (major depressive disorder), recurrent severe, without psychosis (HCC)  No medications changes at this time, will continue to monitor and optimize as indicated:  Abilify 5 mg daily as mood adjunct   Zoloft 150 mg daily for depression and anxiety  Continue to encourage participation in group therapy, milieu therapy and occupational therapy.  Continue to assess for side effects of medications.  Continue collaboration with SLIM for medical co-morbidities as indicated.  Continue discussion with CM/SW to assist with obtaining collateral, disposition planning, and the implementation of patient-centered individualized plan of care.  Continue frequent safety checks and vitals per unit protocol.    Risks, benefits and possible side effects of Medications: Risks, benefits, and possible side effects of medications have previously been explained. No new medications at this time.      Legal status: 201    Disposition: to be determined, pending SOAR application for potential group home placement. OT Cognitive Evaluation completed: \"Pt would benefit from discharge to a supportive environment that can provide checks for safety and compliance with IADLs. \"      No associated orders from this encounter found during lookback period of 72 hours.  Autism spectrum disorder  Continue supportive care    No associated orders from this encounter found during lookback period of 72 hours.      Current medications:  Current Facility-Administered Medications   Medication Dose Route Frequency Provider Last Rate    acetaminophen  650 mg Oral Q6H PRN Basilio Brown MD      acetaminophen  650 mg Oral Q4H PRN Basilio Brown MD      acetaminophen  975 mg Oral Q6H PRN Basilio Brown MD      aluminum-magnesium " hydroxide-simethicone  30 mL Oral Q4H PRN Basilio Brown MD      ARIPiprazole  5 mg Oral Daily Basilio Panda MD      Artificial Tears  1 drop Both Eyes Q3H PRN Basilio Brown MD      atorvastatin  10 mg Oral Daily With Dinner WALLY Baptiste      benztropine  1 mg Intramuscular Q4H PRN Max 6/day Basilio Brown MD      benztropine  1 mg Oral Q4H PRN Max 6/day Basiloi Brown MD      Cholecalciferol  2,000 Units Oral Daily WALLY Baptiste      cyanocobalamin  1,000 mcg Oral Daily WALLY Baptiste      Diclofenac Sodium  2 g Topical 4x Daily PRN WALLY Baptiste      hydrOXYzine HCL  50 mg Oral Q6H PRN Max 4/day Basilio Brown MD      Or    diphenhydrAMINE  50 mg Intramuscular Q6H PRN Basilio Brown MD      diphenhydrAMINE-zinc acetate   Topical Daily PRN Raj Guerrero MD      famotidine  20 mg Oral BID WALLY Baptiste      hydrOXYzine HCL  100 mg Oral Q6H PRN Max 4/day Basilio Brown MD      Or    LORazepam  2 mg Intramuscular Q6H PRN Basilio Brown MD      hydrOXYzine HCL  25 mg Oral Q6H PRN Max 4/day Basilio Brown MD      levothyroxine  37.5 mcg Oral Early Morning WALLY Baptiste      melatonin  6 mg Oral HS PRN WALLY Gomez      methocarbamol  500 mg Oral Q6H PRN WALLY Baptiste      OLANZapine  5 mg Oral Q4H PRN Max 3/day Basilio Brown MD      Or    OLANZapine  2.5 mg Intramuscular Q4H PRN Max 3/day Basiilo Brown MD      OLANZapine  5 mg Oral Q3H PRN Max 3/day Basilio Brown MD      Or    OLANZapine  5 mg Intramuscular Q3H PRN Max 3/day Basilio Brown MD      OLANZapine  2.5 mg Oral Q4H PRN Max 6/day Basilio Brown MD      polyethylene glycol  17 g Oral Daily PRN Basilio Brown MD      propranolol  10 mg Oral Q8H PRN Basilio Brown MD      senna-docusate sodium  1 tablet Oral Daily PRN Basilio Brown MD      sertraline  " 150 mg Oral Daily WALLY Gomez      traZODone  50 mg Oral HS PRN Basilio Brown MD         Risks / Benefits of Treatment:    Risks, benefits, and possible side effects of medications explained to patient and patient verbalizes understanding and agreement for treatment.    Subjective:    Behavior over the last 24 hours: unchanged.     The patient was evaluated this morning for continuity of care and no acute distress noted throughout the evaluation.  Over the past 24 hours staff noted the patient was isolative to room, denies symptoms, cooperative with treatment.      Patient was visited on unit for continuing care; chart reviewed and discussed with multidisciplinary treatment team.  On approach, the patient was calm and cooperative. Franco reports sleeping better and describes mood as \"pretty good.\" Denied any changes in mood, appetite, and energy level. No problem initiating and maintaining sleep.  Denied A/VH currently.  Denied SI/HI, intent or plan upon direct inquiry at this time.    Patient continues to be selectively interactive with staff and peers. No reports of aggression or self-injurious behavior on unit. No PRN medications used in the past 24 hours.    Patient accepted all offered medications and no adverse effects of medications noted or reported.    Sleep: slept better  Appetite: normal  Medication side effects: No   ROS: no complaints    Mental Status Examination:  Appearance:  age appropriate, casually dressed, looks stated age   Behavior:  pleasant, cooperative, calm   Speech:  normal rate and volume   Mood:  depressed   Affect:  constricted   Thought Process:  coherent, goal directed, linear   Associations: concrete associations   Thought Content:  no overt delusions   Perceptual Disturbances: no auditory hallucinations, no visual hallucinations, does not appear responding to internal stimuli   Risk Potential: Suicidal ideation - None at present  Homicidal ideation - None at " present  Potential for aggression - No   Sensorium:  oriented to person, place, and time/date   Memory:  recent and remote memory grossly intact   Consciousness:  alert and awake   Attention/Concentration: attention span and concentration are age appropriate   Insight:  limited   Judgment: limited   Gait/Station: normal gait/station   Motor Activity: no abnormal movements       Vital signs in last 24 hours:    Temp:  [97.4 °F (36.3 °C)-97.6 °F (36.4 °C)] 97.4 °F (36.3 °C)  HR:  [58-75] 58  BP: (119-126)/(62-75) 119/62  Resp:  [16] 16  SpO2:  [97 %-98 %] 97 %  O2 Device: None (Room air)         Laboratory results: I have personally reviewed all pertinent laboratory/tests results    Results from the past 24 hours: No results found for this or any previous visit (from the past 24 hour(s)).    Progress Toward Goals: Franco is currently assessed as being at their baseline with continued need for medication management, supervision for safety and ADL’s. These services are not currently available in a less restrictive environment necessitating continued hospital stay.  Franco should remain on the unit until these services are available, due to likelihood of mental decompensation and readmission if discharged to an unsupervised setting.  Assertive discharge planning and collaboration with multiple providers (inpatient and community based) remains ongoing.  Franco is currently awaiting for private pay group home versus safe [place]      Counseling / Coordination of Care:    Administrative Statements   I have spent a total time of 20 minutes in caring for this patient on the day of the visit/encounter including Counseling / Coordination of care, Documenting in the medical record, and Obtaining or reviewing history  .    Lita Knapp DO 10/25/24    This note was completed in part utilizing Dragon dictation Software. Grammatical, translation, syntax errors, random word insertions, spelling mistakes, and incomplete sentences may  be an occasional consequence of this system secondary to software limitations with voice recognition, ambient noise, and hardware issues. If you have any questions or concerns about the content, text, or information contained within the body of this dictation, please contact the provider for clarification.

## 2024-10-25 NOTE — PROGRESS NOTES
10/25/24 0833   Team Meeting   Meeting Type Daily Rounds   Team Members Present   Team Members Present Physician;Nurse;   Physician Team Member Aria   [facility]   Care Management Team Member Noa   Patient/Family Present   Patient Present No   Patient's Family Present No     Pt is medication and meal compliant. Pt is calm and cooperative. Pt is eager to d/c and reported becoming upset about length of stay. Pt is pending placement.

## 2024-10-25 NOTE — NURSING NOTE
Pt is visible on unit in milieu and dayroom. He keeps to himself most of shift but will answer peers if spoken to. Medication and meal compliant. He denies SI/HI/AH/VH, but appears sad. Pt expresses that he just wants to move forward and feels stuck. Pt did not attend group today. No PRN's requested. No unmet needs.

## 2024-10-26 PROCEDURE — 99232 SBSQ HOSP IP/OBS MODERATE 35: CPT | Performed by: PSYCHIATRY & NEUROLOGY

## 2024-10-26 RX ADMIN — ARIPIPRAZOLE 5 MG: 5 TABLET ORAL at 08:19

## 2024-10-26 RX ADMIN — ATORVASTATIN CALCIUM 10 MG: 10 TABLET, FILM COATED ORAL at 17:18

## 2024-10-26 RX ADMIN — CYANOCOBALAMIN TAB 1000 MCG 1000 MCG: 1000 TAB at 08:19

## 2024-10-26 RX ADMIN — FAMOTIDINE 20 MG: 20 TABLET ORAL at 17:18

## 2024-10-26 RX ADMIN — FAMOTIDINE 20 MG: 20 TABLET ORAL at 08:19

## 2024-10-26 RX ADMIN — CHOLECALCIFEROL TAB 25 MCG (1000 UNIT) 2000 UNITS: 25 TAB at 08:19

## 2024-10-26 RX ADMIN — LEVOTHYROXINE SODIUM 37.5 MCG: 125 TABLET ORAL at 06:37

## 2024-10-26 RX ADMIN — SERTRALINE HYDROCHLORIDE 150 MG: 100 TABLET ORAL at 08:19

## 2024-10-26 NOTE — PROGRESS NOTES
"Progress Note - Behavioral Health   Name: Franco Roberson 39 y.o. male I MRN: [id number]  Unit/Bed#: -02 I Date of Admission: 5/14/2024   Date of Service: 10/26/2024 I Hospital Day: 165     Assessment & Plan  MDD (major depressive disorder), recurrent severe, without psychosis (HCC)  No medications changes at this time, will continue to monitor and optimize as indicated:  Abilify 5 mg daily as mood adjunct   Zoloft 150 mg daily for depression and anxiety  Continue to encourage participation in group therapy, milieu therapy and occupational therapy.  Continue to assess for side effects of medications.  Continue collaboration with SLIM for medical co-morbidities as indicated.  Continue discussion with CM/SW to assist with obtaining collateral, disposition planning, and the implementation of patient-centered individualized plan of care.  Continue frequent safety checks and vitals per unit protocol.    Risks, benefits and possible side effects of Medications: Risks, benefits, and possible side effects of medications have previously been explained. No new medications at this time.      Legal status: 201    Disposition: to be determined, pending SOAR application for potential group home placement. OT Cognitive Evaluation completed: \"Pt would benefit from discharge to a supportive environment that can provide checks for safety and compliance with IADLs. \"      No associated orders from this encounter found during lookback period of 72 hours.  Autism spectrum disorder  Continue supportive care    No associated orders from this encounter found during lookback period of 72 hours.      Current medications:  Current Facility-Administered Medications   Medication Dose Route Frequency Provider Last Rate    acetaminophen  650 mg Oral Q6H PRN Basilio Brown MD      acetaminophen  650 mg Oral Q4H PRN Basilio Brown MD      acetaminophen  975 mg Oral Q6H PRN Basilio Brown MD      aluminum-magnesium " hydroxide-simethicone  30 mL Oral Q4H PRN Basilio Brown MD      ARIPiprazole  5 mg Oral Daily Basilio Panda MD      Artificial Tears  1 drop Both Eyes Q3H PRN Basilio Brown MD      atorvastatin  10 mg Oral Daily With Dinner WALLY Baptiste      benztropine  1 mg Intramuscular Q4H PRN Max 6/day Basilio Brown MD      benztropine  1 mg Oral Q4H PRN Max 6/day Basilio Brown MD      Cholecalciferol  2,000 Units Oral Daily WALLY Baptiste      cyanocobalamin  1,000 mcg Oral Daily WALLY Baptiste      Diclofenac Sodium  2 g Topical 4x Daily PRN WALLY Baptiste      hydrOXYzine HCL  50 mg Oral Q6H PRN Max 4/day Basilio Brown MD      Or    diphenhydrAMINE  50 mg Intramuscular Q6H PRN Basilio Brown MD      diphenhydrAMINE-zinc acetate   Topical Daily PRN Raj Guerrero MD      famotidine  20 mg Oral BID WALLY Baptiste      hydrOXYzine HCL  100 mg Oral Q6H PRN Max 4/day Basilio Brown MD      Or    LORazepam  2 mg Intramuscular Q6H PRN Basilio Brown MD      hydrOXYzine HCL  25 mg Oral Q6H PRN Max 4/day Basilio Brown MD      levothyroxine  37.5 mcg Oral Early Morning WALLY Baptiste      melatonin  6 mg Oral HS PRN WALLY Gomez      methocarbamol  500 mg Oral Q6H PRN WALLY Baptiste      OLANZapine  5 mg Oral Q4H PRN Max 3/day Basilio Brown MD      Or    OLANZapine  2.5 mg Intramuscular Q4H PRN Max 3/day Basilio Brown MD      OLANZapine  5 mg Oral Q3H PRN Max 3/day Basilio Brown MD      Or    OLANZapine  5 mg Intramuscular Q3H PRN Max 3/day Basilio Brown MD      OLANZapine  2.5 mg Oral Q4H PRN Max 6/day Basilio Brown MD      polyethylene glycol  17 g Oral Daily PRN Basilio Brown MD      propranolol  10 mg Oral Q8H PRN Basilio Brown MD      senna-docusate sodium  1 tablet Oral Daily PRN Basilio Brown MD      sertraline   150 mg Oral Daily WALLY Gomez      traZODone  50 mg Oral HS PRN Basilio Brown MD         Risks / Benefits of Treatment:    Risks, benefits, and possible side effects of medications explained to patient and patient verbalizes understanding and agreement for treatment.    Subjective:    Behavior over the last 24 hours: unchanged.     The patient was evaluated this morning for continuity of care and no acute distress noted throughout the evaluation.  Over the past 24 hours staff noted the patient was calm, cooperative with staff, medication and meal compliant.      Patient was visited on unit for continuing care; chart reviewed and discussed with multidisciplinary treatment team.  On approach, the patient was calm and cooperative. Franco reports feeling tired, and states that he struggled to get to bed. Denied any changes in mood, appetite, and energy level. No problem initiating and maintaining sleep.  Denied A/VH currently.  Denied SI/HI, intent or plan upon direct inquiry at this time.    Patient continues to be visible in the milieu and interacts with staff and peers. No reports of aggression or self-injurious behavior on unit. No PRN medications used in the past 24 hours.    Patient accepted all offered medications and no adverse effects of medications noted or reported.    Sleep: slept off and on, difficulty falling asleep  Appetite: normal  Medication side effects: No   ROS: no complaints    Mental Status Examination:  Appearance:  age appropriate, casually dressed, looks stated age   Behavior:  pleasant, cooperative, calm   Speech:  normal rate and volume   Mood:  depressed   Affect:  constricted   Thought Process:  organized, logical, coherent   Associations: intact associations   Thought Content:  no overt delusions   Perceptual Disturbances: no auditory hallucinations, no visual hallucinations, does not appear responding to internal stimuli   Risk Potential: Suicidal ideation - None at  present  Homicidal ideation - None at present  Potential for aggression - No   Sensorium:  oriented to person, place, and time/date   Memory:  recent and remote memory grossly intact   Consciousness:  alert and awake   Attention/Concentration: attention span and concentration are age appropriate   Insight:  limited   Judgment: limited   Gait/Station: normal gait/station   Motor Activity: no abnormal movements       Vital signs in last 24 hours:    Temp:  [96.8 °F (36 °C)-96.9 °F (36.1 °C)] 96.9 °F (36.1 °C)  HR:  [67-81] 67  BP: (110-134)/(66-74) 134/74  Resp:  [16] 16  SpO2:  [96 %-98 %] 96 %  O2 Device: None (Room air)         Laboratory results: I have personally reviewed all pertinent laboratory/tests results    Most Recent Labs:   Lab Results   Component Value Date    WBC 7.01 06/27/2024    RBC 4.54 06/27/2024    HGB 14.5 06/27/2024    HCT 44.4 06/27/2024     06/27/2024    RDW 12.0 06/27/2024    NEUTROABS 4.01 06/27/2024    SODIUM 139 07/10/2024    K 3.8 07/10/2024     07/10/2024    CO2 27 07/10/2024    BUN 16 07/10/2024    CREATININE 0.92 07/10/2024    GLUC 87 07/10/2024    CALCIUM 9.3 07/10/2024    AST 28 07/10/2024    ALT 36 07/10/2024    ALKPHOS 66 07/10/2024    TP 7.7 07/10/2024    ALB 4.4 07/10/2024    TBILI 0.55 07/10/2024    CHOLESTEROL 168 09/19/2024    HDL 42 09/19/2024    TRIG 137 09/19/2024    LDLCALC 99 09/19/2024    NONHDLC 126 09/19/2024    [id number] 6.241 (H) 09/10/2024    FREET4 0.56 (L) 09/10/2024    SYPHILISAB Non-reactive 05/15/2024       Progress Toward Goals: Franco is currently assessed as being at their baseline with continued need for medication management, supervision for safety and ADL’s. These services are not currently available in a less restrictive environment necessitating continued hospital stay.  Franco should remain on the unit until these services are available, due to likelihood of mental decompensation and readmission if discharged to an unsupervised  setting.  Assertive discharge planning and collaboration with multiple providers (inpatient and community based) remains ongoing.  Franco is currently awaiting for private pay group home versus safe [place]      Counseling / Coordination of Care:    Medication changes reviewed with nursing staff.  Patient's progress reviewed with nursing staff.  Medications, treatment progress and treatment plan reviewed with patient.  Medication changes discussed with patient.  Medication education provided to patient.  Patient's progress reviewed with case management staff.  Reassurance and supportive therapy provided.}    Lita Knapp,  10/26/24    This note was completed in part utilizing Dragon dictation Software. Grammatical, translation, syntax errors, random word insertions, spelling mistakes, and incomplete sentences may be an occasional consequence of this system secondary to software limitations with voice recognition, ambient noise, and hardware issues. If you have any questions or concerns about the content, text, or information contained within the body of this dictation, please contact the provider for clarification.

## 2024-10-26 NOTE — NURSING NOTE
Pt calm upon approach. Seclusive to self, only socializes with peers or staff if they approach him first. Visible on the unit periodically. Denies SI/HI/AH/VH but appears sad at times during conversation. Medication and meal compliant.

## 2024-10-26 NOTE — PLAN OF CARE
Problem: Ineffective Coping  Goal: Participates in unit activities  Description: Interventions:  - Provide therapeutic environment   - Provide required programming   - Redirect inappropriate behaviors   10/26/2024 0409 by Neida Sharpe RN  Outcome: Progressing  10/26/2024 0407 by Neida Sharpe, RN  Outcome: Progressing     Problem: Depression  Goal: Treatment Goal: Demonstrate behavioral control of depressive symptoms, verbalize feelings of improved mood/affect, and adopt new coping skills prior to discharge  Outcome: Progressing  Goal: Verbalize thoughts and feelings  Description: Interventions:  - Assess and re-assess patient's level of risk   - Engage patient in 1:1 interactions, daily, for a minimum of 15 minutes   - Encourage patient to express feelings, fears, frustrations, hopes   Outcome: Progressing  Goal: Refrain from harming self  Description: Interventions:  - Monitor patient closely, per order   - Supervise medication ingestion, monitor effects and side effects   Outcome: Progressing     Problem: Anxiety  Goal: Anxiety is at manageable level  Description: Interventions:  - Assess and monitor patient's anxiety level.   - Monitor for signs and symptoms (heart palpitations, chest pain, shortness of breath, headaches, nausea, feeling jumpy, restlessness, irritable, apprehensive).   - Collaborate with interdisciplinary team and initiate plan and interventions as ordered.  - [place] patient to unit/surroundings  - Explain treatment plan  - Encourage participation in care  - Encourage verbalization of concerns/fears  - Identify coping mechanisms  - Assist in developing anxiety-reducing skills  - Administer/offer alternative therapies  - Limit or eliminate stimulants  Outcome: Progressing     Problem: DISCHARGE PLANNING - CARE MANAGEMENT  Goal: Discharge to post-acute care or home with appropriate resources  Description: INTERVENTIONS:  - Conduct assessment to determine patient/family and health care team  treatment goals, and need for post-acute services based on payer coverage, community resources, and patient preferences, and barriers to discharge  - Address psychosocial, clinical, and financial barriers to discharge as identified in assessment in conjunction with the patient/family and health care team  - Arrange appropriate level of post-acute services according to patient’s   needs and preference and payer coverage in collaboration with the physician and health care team  - Communicate with and update the patient/family, physician, and health care team regarding progress on the discharge plan  - Arrange appropriate transportation to post-acute venues  Outcome: Progressing     Problem: Knowledge Deficit  Goal: Patient/family/caregiver demonstrates understanding of disease process, treatment plan, medications, and discharge instructions  Description: Complete learning assessment and assess knowledge base.  Interventions:  - Provide teaching at level of understanding  - Provide teaching via preferred learning methods  Outcome: Progressing     Problem: SLEEP DISTURBANCE  Goal: Will exhibit normal sleeping pattern  Description: Interventions:  -  Assess the patients sleep pattern, noting recent changes  - Administer medication as ordered  - Decrease environmental stimuli, including noise, as appropriate during the night  - Encourage the patient to actively participate in unit groups and or exercise during the day to enhance ability to achieve adequate sleep at night  - Assess the patient, in the morning, encouraging a description of sleep experience  Outcome: Progressing     Problem: Depression  Goal: Refrain from isolation  Description: Interventions:  - Develop a trusting relationship   - Encourage socialization   Outcome: Not Progressing

## 2024-10-27 PROCEDURE — 99232 SBSQ HOSP IP/OBS MODERATE 35: CPT | Performed by: PSYCHIATRY & NEUROLOGY

## 2024-10-27 RX ADMIN — FAMOTIDINE 20 MG: 20 TABLET ORAL at 08:39

## 2024-10-27 RX ADMIN — CYANOCOBALAMIN TAB 1000 MCG 1000 MCG: 1000 TAB at 08:39

## 2024-10-27 RX ADMIN — LEVOTHYROXINE SODIUM 37.5 MCG: 125 TABLET ORAL at 06:18

## 2024-10-27 RX ADMIN — SERTRALINE HYDROCHLORIDE 150 MG: 100 TABLET ORAL at 08:39

## 2024-10-27 RX ADMIN — ARIPIPRAZOLE 5 MG: 5 TABLET ORAL at 08:40

## 2024-10-27 RX ADMIN — CHOLECALCIFEROL TAB 25 MCG (1000 UNIT) 2000 UNITS: 25 TAB at 08:39

## 2024-10-27 RX ADMIN — ATORVASTATIN CALCIUM 10 MG: 10 TABLET, FILM COATED ORAL at 17:03

## 2024-10-27 RX ADMIN — FAMOTIDINE 20 MG: 20 TABLET ORAL at 17:03

## 2024-10-27 NOTE — PLAN OF CARE
Problem: Ineffective Coping  Goal: Participates in unit activities  Description: Interventions:  - Provide therapeutic environment   - Provide required programming   - Redirect inappropriate behaviors   Outcome: Progressing     Problem: Depression  Goal: Treatment Goal: Demonstrate behavioral control of depressive symptoms, verbalize feelings of improved mood/affect, and adopt new coping skills prior to discharge  Outcome: Progressing  Goal: Verbalize thoughts and feelings  Description: Interventions:  - Assess and re-assess patient's level of risk   - Engage patient in 1:1 interactions, daily, for a minimum of 15 minutes   - Encourage patient to express feelings, fears, frustrations, hopes   Outcome: Progressing  Goal: Refrain from harming self  Description: Interventions:  - Monitor patient closely, per order   - Supervise medication ingestion, monitor effects and side effects   Outcome: Progressing  Goal: Refrain from isolation  Description: Interventions:  - Develop a trusting relationship   - Encourage socialization   Outcome: Progressing     Problem: Anxiety  Goal: Anxiety is at manageable level  Description: Interventions:  - Assess and monitor patient's anxiety level.   - Monitor for signs and symptoms (heart palpitations, chest pain, shortness of breath, headaches, nausea, feeling jumpy, restlessness, irritable, apprehensive).   - Collaborate with interdisciplinary team and initiate plan and interventions as ordered.  - [place] patient to unit/surroundings  - Explain treatment plan  - Encourage participation in care  - Encourage verbalization of concerns/fears  - Identify coping mechanisms  - Assist in developing anxiety-reducing skills  - Administer/offer alternative therapies  - Limit or eliminate stimulants  Outcome: Progressing     Problem: Knowledge Deficit  Goal: Patient/family/caregiver demonstrates understanding of disease process, treatment plan, medications, and discharge  instructions  Description: Complete learning assessment and assess knowledge base.  Interventions:  - Provide teaching at level of understanding  - Provide teaching via preferred learning methods  Outcome: Progressing

## 2024-10-27 NOTE — NURSING NOTE
Patient has been in the milieu much of the evening sitting in the dayroom with a group of peers who were coloring and chatting. He has been quiet but pleasant as usual. He denies S.I.H.I.A/H V/H

## 2024-10-27 NOTE — NURSING NOTE
Pt pleasant and cooperative upon approach. Withdrawn to room. Denies SI/HI/AH/VH. Medication and meal compliant. Verbalizes needs.

## 2024-10-27 NOTE — PROGRESS NOTES
"Progress Note - Behavioral Health   Name: Franco Roberson 39 y.o. male I MRN: [id number]  Unit/Bed#: -02 I Date of Admission: 5/14/2024   Date of Service: 10/27/2024 I Hospital Day: 166     Assessment & Plan  MDD (major depressive disorder), recurrent severe, without psychosis (HCC)  No medications changes at this time, will continue to monitor and optimize as indicated:  Abilify 5 mg daily as mood adjunct   Zoloft 150 mg daily for depression and anxiety  Continue to encourage participation in group therapy, milieu therapy and occupational therapy.  Continue to assess for side effects of medications.  Continue collaboration with SLIM for medical co-morbidities as indicated.  Continue discussion with CM/SW to assist with obtaining collateral, disposition planning, and the implementation of patient-centered individualized plan of care.  Continue frequent safety checks and vitals per unit protocol.    Risks, benefits and possible side effects of Medications: Risks, benefits, and possible side effects of medications have previously been explained. No new medications at this time.      Legal status: 201    Disposition: to be determined, pending SOAR application for potential group home placement. OT Cognitive Evaluation completed: \"Pt would benefit from discharge to a supportive environment that can provide checks for safety and compliance with IADLs. \"      No associated orders from this encounter found during lookback period of 72 hours.  Autism spectrum disorder  Continue supportive care    No associated orders from this encounter found during lookback period of 72 hours.      Current medications:  Current Facility-Administered Medications   Medication Dose Route Frequency Provider Last Rate    acetaminophen  650 mg Oral Q6H PRN Basilio Brown MD      acetaminophen  650 mg Oral Q4H PRN Basilio Brown MD      acetaminophen  975 mg Oral Q6H PRN Basilio Brwon MD      aluminum-magnesium " hydroxide-simethicone  30 mL Oral Q4H PRN Basilio Brown MD      ARIPiprazole  5 mg Oral Daily Basilio Panda MD      Artificial Tears  1 drop Both Eyes Q3H PRN Basilio Brown MD      atorvastatin  10 mg Oral Daily With Dinner WALLY Baptiste      benztropine  1 mg Intramuscular Q4H PRN Max 6/day Basilio Brown MD      benztropine  1 mg Oral Q4H PRN Max 6/day Basilio Brown MD      Cholecalciferol  2,000 Units Oral Daily WALLY Baptiste      cyanocobalamin  1,000 mcg Oral Daily WALLY Baptiste      Diclofenac Sodium  2 g Topical 4x Daily PRN WALLY Baptiste      hydrOXYzine HCL  50 mg Oral Q6H PRN Max 4/day Basilio Brown MD      Or    diphenhydrAMINE  50 mg Intramuscular Q6H PRN Basilio Brown MD      diphenhydrAMINE-zinc acetate   Topical Daily PRN Raj Guerrero MD      famotidine  20 mg Oral BID WALLY Baptiste      hydrOXYzine HCL  100 mg Oral Q6H PRN Max 4/day Basilio Brown MD      Or    LORazepam  2 mg Intramuscular Q6H PRN Basilio Brown MD      hydrOXYzine HCL  25 mg Oral Q6H PRN Max 4/day Basilio Brown MD      levothyroxine  37.5 mcg Oral Early Morning WALLY Baptiste      melatonin  6 mg Oral HS PRN WALLY Gomez      methocarbamol  500 mg Oral Q6H PRN WALLY Baptiste      OLANZapine  5 mg Oral Q4H PRN Max 3/day Basilio Brown MD      Or    OLANZapine  2.5 mg Intramuscular Q4H PRN Max 3/day Basilio Brown MD      OLANZapine  5 mg Oral Q3H PRN Max 3/day Basilio Brown MD      Or    OLANZapine  5 mg Intramuscular Q3H PRN Max 3/day Basilio Brown MD      OLANZapine  2.5 mg Oral Q4H PRN Max 6/day Basilio Brown MD      polyethylene glycol  17 g Oral Daily PRN Basilio Brown MD      propranolol  10 mg Oral Q8H PRN Basilio Brown MD      senna-docusate sodium  1 tablet Oral Daily PRN Basilio Brown MD      sertraline   150 mg Oral Daily WALLY Gomez      traZODone  50 mg Oral HS PRN Basilio Brown MD         Risks / Benefits of Treatment:    Risks, benefits, and possible side effects of medications explained to patient and patient verbalizes understanding and agreement for treatment.    Subjective:    Behavior over the last 24 hours: unchanged.     The patient was evaluated this morning for continuity of care and no acute distress noted throughout the evaluation.  Over the past 24 hours staff noted the patient was cooperative and calm, observed writing poetry yesterday.      Patient was visited on unit for continuing care; chart reviewed and discussed with multidisciplinary treatment team.  On approach, the patient was calm, pleasant and cooperative.  Franco states that he was able to sleep much better than the last couple of days, he describes his mood as okay and denies any changes.  He does state that he feels like his depression and anxiety he was starting to feel over the last couple days has subsided due to feeling comfortable with the discharge planning.  He states that he has decided that a private group home would be a good option so that if Social Security is declined he still feels content with the other options.  Denied any changes in mood, appetite, and energy level. No problem initiating and maintaining sleep.  Denied A/VH currently.  Denied SI/HI, intent or plan upon direct inquiry at this time.    Patient continues to be selectively interactive with staff and peers. No reports of aggression or self-injurious behavior on unit. No PRN medications used in the past 24 hours.    Patient accepted all offered medications and no adverse effects of medications noted or reported.    Sleep: slept better  Appetite: normal  Medication side effects: No   ROS: no complaints    Mental Status Examination:  Appearance:  age appropriate, casually dressed, looks stated age   Behavior:  pleasant, cooperative, calm    Speech:  normal rate and volume   Mood:  euthymic   Affect:  constricted   Thought Process:  organized, logical, coherent   Associations: intact associations   Thought Content:  no overt delusions   Perceptual Disturbances: no auditory hallucinations, no visual hallucinations, does not appear responding to internal stimuli   Risk Potential: Suicidal ideation - None at present  Homicidal ideation - None at present  Potential for aggression - No   Sensorium:  oriented to person, place, and time/date   Memory:  recent and remote memory grossly intact   Consciousness:  alert and awake   Attention/Concentration: attention span and concentration are age appropriate   Insight:  limited   Judgment: limited   Gait/Station: normal gait/station   Motor Activity: no abnormal movements       Vital signs in last 24 hours:    Temp:  [96.5 °F (35.8 °C)-96.8 °F (36 °C)] 96.5 °F (35.8 °C)  HR:  [71-84] 71  BP: (116-123)/(73-74) 116/74  Resp:  [16-17] 16  SpO2:  [95 %-98 %] 98 %  O2 Device: None (Room air)         Laboratory results: I have personally reviewed all pertinent laboratory/tests results    Most Recent Labs:   Lab Results   Component Value Date    WBC 7.01 06/27/2024    RBC 4.54 06/27/2024    HGB 14.5 06/27/2024    HCT 44.4 06/27/2024     06/27/2024    RDW 12.0 06/27/2024    NEUTROABS 4.01 06/27/2024    SODIUM 139 07/10/2024    K 3.8 07/10/2024     07/10/2024    CO2 27 07/10/2024    BUN 16 07/10/2024    CREATININE 0.92 07/10/2024    GLUC 87 07/10/2024    CALCIUM 9.3 07/10/2024    AST 28 07/10/2024    ALT 36 07/10/2024    ALKPHOS 66 07/10/2024    TP 7.7 07/10/2024    ALB 4.4 07/10/2024    TBILI 0.55 07/10/2024    CHOLESTEROL 168 09/19/2024    HDL 42 09/19/2024    TRIG 137 09/19/2024    LDLCALC 99 09/19/2024    NONHDLC 126 09/19/2024    [id number] 6.241 (H) 09/10/2024    FREET4 0.56 (L) 09/10/2024    SYPHILISAB Non-reactive 05/15/2024       Progress Toward Goals: Franco is currently assessed as being at their  baseline with continued need for medication management, supervision for safety and ADL’s. These services are not currently available in a less restrictive environment necessitating continued hospital stay.  Franco should remain on the unit until these services are available, due to likelihood of mental decompensation and readmission if discharged to an unsupervised setting.  Assertive discharge planning and collaboration with multiple providers (inpatient and community based) remains ongoing.  Franco is currently awaiting for private pay group [facility] versus [facility]      Counseling / Coordination of Care:    Medication changes reviewed with nursing staff.  Patient's progress reviewed with nursing staff.  Medications, treatment progress and treatment plan reviewed with patient.  Medication changes discussed with patient.  Medication education provided to patient.  Patient's progress reviewed with case management staff.  Reassurance and supportive therapy provided.}    Lita Knapp, DO 10/27/24    This note was completed in part utilizing Dragon dictation Software. Grammatical, translation, syntax errors, random word insertions, spelling mistakes, and incomplete sentences may be an occasional consequence of this system secondary to software limitations with voice recognition, ambient noise, and hardware issues. If you have any questions or concerns about the content, text, or information contained within the body of this dictation, please contact the provider for clarification.

## 2024-10-28 PROCEDURE — 99232 SBSQ HOSP IP/OBS MODERATE 35: CPT | Performed by: PSYCHIATRY & NEUROLOGY

## 2024-10-28 RX ADMIN — CHOLECALCIFEROL TAB 25 MCG (1000 UNIT) 2000 UNITS: 25 TAB at 08:21

## 2024-10-28 RX ADMIN — FAMOTIDINE 20 MG: 20 TABLET ORAL at 08:21

## 2024-10-28 RX ADMIN — ATORVASTATIN CALCIUM 10 MG: 10 TABLET, FILM COATED ORAL at 17:29

## 2024-10-28 RX ADMIN — FAMOTIDINE 20 MG: 20 TABLET ORAL at 17:29

## 2024-10-28 RX ADMIN — SERTRALINE HYDROCHLORIDE 150 MG: 100 TABLET ORAL at 08:20

## 2024-10-28 RX ADMIN — ARIPIPRAZOLE 5 MG: 5 TABLET ORAL at 08:20

## 2024-10-28 RX ADMIN — LEVOTHYROXINE SODIUM 37.5 MCG: 125 TABLET ORAL at 06:30

## 2024-10-28 RX ADMIN — CYANOCOBALAMIN TAB 1000 MCG 1000 MCG: 1000 TAB at 08:20

## 2024-10-28 NOTE — NURSING NOTE
Patient has been in his room all evening and has been napping on and off. He denies S.I.H.I.A/H V/H

## 2024-10-28 NOTE — PLAN OF CARE
Pt attends some groups, but does appear to have a slow decrease in attendance frequency. Pt typically quiet, but participates appropriately when prompted.

## 2024-10-28 NOTE — PROGRESS NOTES
"Progress Note - Behavioral Health   Name: Franco Roberson 39 y.o. male I MRN: [id number]  Unit/Bed#: -02 I Date of Admission: 5/14/2024   Date of Service: 10/28/2024 I Hospital Day: 167     Assessment & Plan  MDD (major depressive disorder), recurrent severe, without psychosis (HCC)  No medications changes at this time, will continue to monitor and optimize as indicated:  Abilify 5 mg daily as mood adjunct   Zoloft 150 mg daily for depression and anxiety  Continue to encourage participation in group therapy, milieu therapy and occupational therapy.  Continue to assess for side effects of medications.  Continue collaboration with SLIM for medical co-morbidities as indicated.  Continue discussion with CM/SW to assist with obtaining collateral, disposition planning, and the implementation of patient-centered individualized plan of care.  Continue frequent safety checks and vitals per unit protocol.    Risks, benefits and possible side effects of Medications: Risks, benefits, and possible side effects of medications have previously been explained. No new medications at this time.      Legal status: 201    Disposition: to be determined, pending SOAR application for potential group home placement. OT Cognitive Evaluation completed: \"Pt would benefit from discharge to a supportive environment that can provide checks for safety and compliance with IADLs. \"      No associated orders from this encounter found during lookback period of 72 hours.  Autism spectrum disorder  Continue supportive care    No associated orders from this encounter found during lookback period of 72 hours.      Current medications:  Current Facility-Administered Medications   Medication Dose Route Frequency Provider Last Rate    acetaminophen  650 mg Oral Q6H PRN Basilio Brown MD      acetaminophen  650 mg Oral Q4H PRN Basilio Brown MD      acetaminophen  975 mg Oral Q6H PRN Basilio Brown MD      aluminum-magnesium " hydroxide-simethicone  30 mL Oral Q4H PRN Basilio Brown MD      ARIPiprazole  5 mg Oral Daily Basilio Panda MD      Artificial Tears  1 drop Both Eyes Q3H PRN Basilio Brown MD      atorvastatin  10 mg Oral Daily With Dinner WALLY Baptiste      benztropine  1 mg Intramuscular Q4H PRN Max 6/day Basilio Brown MD      benztropine  1 mg Oral Q4H PRN Max 6/day Basilio Brown MD      Cholecalciferol  2,000 Units Oral Daily WALLY Baptiste      cyanocobalamin  1,000 mcg Oral Daily WALLY Baptiste      Diclofenac Sodium  2 g Topical 4x Daily PRN WALLY Baptiste      hydrOXYzine HCL  50 mg Oral Q6H PRN Max 4/day Basilio Brown MD      Or    diphenhydrAMINE  50 mg Intramuscular Q6H PRN Basilio Brown MD      diphenhydrAMINE-zinc acetate   Topical Daily PRN Raj Guerrero MD      famotidine  20 mg Oral BID WALLY Baptiste      hydrOXYzine HCL  100 mg Oral Q6H PRN Max 4/day Basilio Brown MD      Or    LORazepam  2 mg Intramuscular Q6H PRN Basilio Brown MD      hydrOXYzine HCL  25 mg Oral Q6H PRN Max 4/day Basilio Brown MD      levothyroxine  37.5 mcg Oral Early Morning WALLY Baptiste      melatonin  6 mg Oral HS PRN WALLY Gomez      methocarbamol  500 mg Oral Q6H PRN WALLY Baptiste      OLANZapine  5 mg Oral Q4H PRN Max 3/day Basilio Brown MD      Or    OLANZapine  2.5 mg Intramuscular Q4H PRN Max 3/day Basilio Brown MD      OLANZapine  5 mg Oral Q3H PRN Max 3/day Basilio Brown MD      Or    OLANZapine  5 mg Intramuscular Q3H PRN Max 3/day Basilio Brown MD      OLANZapine  2.5 mg Oral Q4H PRN Max 6/day Basilio Brown MD      polyethylene glycol  17 g Oral Daily PRN Basilio Brown MD      propranolol  10 mg Oral Q8H PRN Basilio Brown MD      senna-docusate sodium  1 tablet Oral Daily PRN Basilio Brown MD      sertraline   150 mg Oral Daily WALLY Gomez      traZODone  50 mg Oral HS PRN Basilio Brown MD         Risks / Benefits of Treatment:    Risks, benefits, and possible side effects of medications explained to patient and patient verbalizes understanding and agreement for treatment.    Subjective:    Behavior over the last 24 hours: unchanged.     The patient was evaluated this morning for continuity of care and no acute distress noted throughout the evaluation.  Over the past 24 hours staff noted the patient was pleasant, quiet, attends groups periodically.      Patient was visited on unit for continuing care; chart reviewed and discussed with multidisciplinary treatment team.  On approach, the patient was calm and cooperative.  Franco reports mood is good, he has been sleeping well, he is waiting to hear update on social security forms.  Denied any changes in mood, appetite, and energy level. No problem initiating and maintaining sleep.  Denied A/VH currently.  Denied SI/HI, intent or plan upon direct inquiry at this time.    Patient continues to be selectively interactive with staff and peers. No reports of aggression or self-injurious behavior on unit. No PRN medications used in the past 24 hours.    Patient accepted all offered medications and no adverse effects of medications noted or reported.    Sleep: slept better  Appetite: normal  Medication side effects: No   ROS: no complaints    Mental Status Examination:  Appearance:  age appropriate, casually dressed, looks stated age   Behavior:  pleasant, cooperative, calm   Speech:  normal rate and volume   Mood:  euthymic   Affect:  constricted   Thought Process:  organized, logical, coherent, goal directed, linear   Associations: intact associations   Thought Content:  no overt delusions   Perceptual Disturbances: no auditory hallucinations, no visual hallucinations, does not appear responding to internal stimuli   Risk Potential: Suicidal ideation - None  at present  Homicidal ideation - None at present  Potential for aggression - No   Sensorium:  oriented to person, place, and time/date   Memory:  recent and remote memory grossly intact   Consciousness:  alert and awake   Attention/Concentration: attention span and concentration are age appropriate   Insight:  limited   Judgment: limited   Gait/Station: normal gait/station   Motor Activity: no abnormal movements       Vital signs in last 24 hours:    Temp:  [97.1 °F (36.2 °C)-97.5 °F (36.4 °C)] 97.1 °F (36.2 °C)  HR:  [70-85] 70  BP: (110-130)/(77-79) 110/77  Resp:  [17-18] 18  SpO2:  [98 %] 98 %  O2 Device: None (Room air)         Laboratory results: I have personally reviewed all pertinent laboratory/tests results    Most Recent Labs:   Lab Results   Component Value Date    WBC 7.01 06/27/2024    RBC 4.54 06/27/2024    HGB 14.5 06/27/2024    HCT 44.4 06/27/2024     06/27/2024    RDW 12.0 06/27/2024    NEUTROABS 4.01 06/27/2024    SODIUM 139 07/10/2024    K 3.8 07/10/2024     07/10/2024    CO2 27 07/10/2024    BUN 16 07/10/2024    CREATININE 0.92 07/10/2024    GLUC 87 07/10/2024    CALCIUM 9.3 07/10/2024    AST 28 07/10/2024    ALT 36 07/10/2024    ALKPHOS 66 07/10/2024    TP 7.7 07/10/2024    ALB 4.4 07/10/2024    TBILI 0.55 07/10/2024    CHOLESTEROL 168 09/19/2024    HDL 42 09/19/2024    TRIG 137 09/19/2024    LDLCALC 99 09/19/2024    NONHDLC 126 09/19/2024    [id number] 6.241 (H) 09/10/2024    FREET4 0.56 (L) 09/10/2024    SYPHILISAB Non-reactive 05/15/2024       Progress Toward Goals: Franco is currently assessed as being at their baseline with continued need for medication management, supervision for safety and ADL’s. These services are not currently available in a less restrictive environment necessitating continued hospital stay.  Franco should remain on the unit until these services are available, due to likelihood of mental decompensation and readmission if discharged to an unsupervised  setting.  Assertive discharge planning and collaboration with multiple providers (inpatient and community based) remains ongoing.  Franco is currently awaiting for private pay group home versus safe [place]      Counseling / Coordination of Care:    Patient's progress discussed with staff in treatment team meeting.  Medication changes reviewed with staff in treatment team meeting.  Patient's progress reviewed with nursing staff.  Medications, treatment progress and treatment plan reviewed with patient.  Medication changes discussed with patient.  Medication education provided to patient.  Patient's progress reviewed with case management staff.  Reassurance and supportive therapy provided.}    Lita Knapp,  10/28/24    This note was completed in part utilizing Dragon dictation Software. Grammatical, translation, syntax errors, random word insertions, spelling mistakes, and incomplete sentences may be an occasional consequence of this system secondary to software limitations with voice recognition, ambient noise, and hardware issues. If you have any questions or concerns about the content, text, or information contained within the body of this dictation, please contact the provider for clarification.

## 2024-10-28 NOTE — PLAN OF CARE
Problem: Ineffective Coping  Goal: Participates in unit activities  Description: Interventions:  - Provide therapeutic environment   - Provide required programming   - Redirect inappropriate behaviors   Outcome: Progressing  Note: Decreased attendance in groups.     Problem: Depression  Goal: Treatment Goal: Demonstrate behavioral control of depressive symptoms, verbalize feelings of improved mood/affect, and adopt new coping skills prior to discharge  Outcome: Progressing  Goal: Verbalize thoughts and feelings  Description: Interventions:  - Assess and re-assess patient's level of risk   - Engage patient in 1:1 interactions, daily, for a minimum of 15 minutes   - Encourage patient to express feelings, fears, frustrations, hopes   Outcome: Progressing  Goal: Refrain from harming self  Description: Interventions:  - Monitor patient closely, per order   - Supervise medication ingestion, monitor effects and side effects   Outcome: Progressing  Goal: Refrain from isolation  Description: Interventions:  - Develop a trusting relationship   - Encourage socialization   Outcome: Progressing  Note: With a lot of encouragement and support from staff to come into the milieu     Problem: Anxiety  Goal: Anxiety is at manageable level  Description: Interventions:  - Assess and monitor patient's anxiety level.   - Monitor for signs and symptoms (heart palpitations, chest pain, shortness of breath, headaches, nausea, feeling jumpy, restlessness, irritable, apprehensive).   - Collaborate with interdisciplinary team and initiate plan and interventions as ordered.  - [place] patient to unit/surroundings  - Explain treatment plan  - Encourage participation in care  - Encourage verbalization of concerns/fears  - Identify coping mechanisms  - Assist in developing anxiety-reducing skills  - Administer/offer alternative therapies  - Limit or eliminate stimulants  Outcome: Progressing     Problem: DISCHARGE PLANNING - CARE MANAGEMENT  Goal:  Discharge to post-acute care or home with appropriate resources  Description: INTERVENTIONS:  - Conduct assessment to determine patient/family and health care team treatment goals, and need for post-acute services based on payer coverage, community resources, and patient preferences, and barriers to discharge  - Address psychosocial, clinical, and financial barriers to discharge as identified in assessment in conjunction with the patient/family and health care team  - Arrange appropriate level of post-acute services according to patient’s   needs and preference and payer coverage in collaboration with the physician and health care team  - Communicate with and update the patient/family, physician, and health care team regarding progress on the discharge plan  - Arrange appropriate transportation to post-acute venues  Outcome: Progressing     Problem: Knowledge Deficit  Goal: Patient/family/caregiver demonstrates understanding of disease process, treatment plan, medications, and discharge instructions  Description: Complete learning assessment and assess knowledge base.  Interventions:  - Provide teaching at level of understanding  - Provide teaching via preferred learning methods  Outcome: Progressing     Problem: SLEEP DISTURBANCE  Goal: Will exhibit normal sleeping pattern  Description: Interventions:  -  Assess the patients sleep pattern, noting recent changes  - Administer medication as ordered  - Decrease environmental stimuli, including noise, as appropriate during the night  - Encourage the patient to actively participate in unit groups and or exercise during the day to enhance ability to achieve adequate sleep at night  - Assess the patient, in the morning, encouraging a description of sleep experience  Outcome: Progressing

## 2024-10-29 PROCEDURE — 99232 SBSQ HOSP IP/OBS MODERATE 35: CPT | Performed by: PSYCHIATRY & NEUROLOGY

## 2024-10-29 RX ADMIN — ATORVASTATIN CALCIUM 10 MG: 10 TABLET, FILM COATED ORAL at 17:14

## 2024-10-29 RX ADMIN — CHOLECALCIFEROL TAB 25 MCG (1000 UNIT) 2000 UNITS: 25 TAB at 08:17

## 2024-10-29 RX ADMIN — LEVOTHYROXINE SODIUM 37.5 MCG: 125 TABLET ORAL at 06:45

## 2024-10-29 RX ADMIN — SERTRALINE HYDROCHLORIDE 150 MG: 100 TABLET ORAL at 08:17

## 2024-10-29 RX ADMIN — FAMOTIDINE 20 MG: 20 TABLET ORAL at 17:14

## 2024-10-29 RX ADMIN — FAMOTIDINE 20 MG: 20 TABLET ORAL at 08:17

## 2024-10-29 RX ADMIN — ARIPIPRAZOLE 5 MG: 5 TABLET ORAL at 08:17

## 2024-10-29 RX ADMIN — CYANOCOBALAMIN TAB 1000 MCG 1000 MCG: 1000 TAB at 08:17

## 2024-10-29 NOTE — PLAN OF CARE
Problem: Depression  Goal: Refrain from harming self  Description: Interventions:  - Monitor patient closely, per order   - Supervise medication ingestion, monitor effects and side effects   Outcome: Progressing     Problem: Anxiety  Goal: Anxiety is at manageable level  Description: Interventions:  - Assess and monitor patient's anxiety level.   - Monitor for signs and symptoms (heart palpitations, chest pain, shortness of breath, headaches, nausea, feeling jumpy, restlessness, irritable, apprehensive).   - Collaborate with interdisciplinary team and initiate plan and interventions as ordered.  - [place] patient to unit/surroundings  - Explain treatment plan  - Encourage participation in care  - Encourage verbalization of concerns/fears  - Identify coping mechanisms  - Assist in developing anxiety-reducing skills  - Administer/offer alternative therapies  - Limit or eliminate stimulants  Outcome: Progressing     Problem: DISCHARGE PLANNING - CARE MANAGEMENT  Goal: Discharge to post-acute care or home with appropriate resources  Description: INTERVENTIONS:  - Conduct assessment to determine patient/family and health care team treatment goals, and need for post-acute services based on payer coverage, community resources, and patient preferences, and barriers to discharge  - Address psychosocial, clinical, and financial barriers to discharge as identified in assessment in conjunction with the patient/family and health care team  - Arrange appropriate level of post-acute services according to patient’s   needs and preference and payer coverage in collaboration with the physician and health care team  - Communicate with and update the patient/family, physician, and health care team regarding progress on the discharge plan  - Arrange appropriate transportation to post-acute venues  Outcome: Progressing     Problem: Knowledge Deficit  Goal: Patient/family/caregiver demonstrates understanding of disease process, treatment  plan, medications, and discharge instructions  Description: Complete learning assessment and assess knowledge base.  Interventions:  - Provide teaching at level of understanding  - Provide teaching via preferred learning methods  Outcome: Progressing     Problem: SLEEP DISTURBANCE  Goal: Will exhibit normal sleeping pattern  Description: Interventions:  -  Assess the patients sleep pattern, noting recent changes  - Administer medication as ordered  - Decrease environmental stimuli, including noise, as appropriate during the night  - Encourage the patient to actively participate in unit groups and or exercise during the day to enhance ability to achieve adequate sleep at night  - Assess the patient, in the morning, encouraging a description of sleep experience  Outcome: Progressing     Problem: Ineffective Coping  Goal: Participates in unit activities  Description: Interventions:  - Provide therapeutic environment   - Provide required programming   - Redirect inappropriate behaviors   Outcome: Not Progressing     Problem: Depression  Goal: Treatment Goal: Demonstrate behavioral control of depressive symptoms, verbalize feelings of improved mood/affect, and adopt new coping skills prior to discharge  Outcome: Not Progressing  Goal: Verbalize thoughts and feelings  Description: Interventions:  - Assess and re-assess patient's level of risk   - Engage patient in 1:1 interactions, daily, for a minimum of 15 minutes   - Encourage patient to express feelings, fears, frustrations, hopes   Outcome: Not Progressing  Goal: Refrain from isolation  Description: Interventions:  - Develop a trusting relationship   - Encourage socialization   Outcome: Not Progressing

## 2024-10-29 NOTE — SOCIAL WORK
Gabo called [facility] in [place] asking if Pt's referral is still valid for Case management services, They reported it is  and Pt will need a new one submitted.     Gabo submitted the new referral for case management to [email or web address].

## 2024-10-29 NOTE — PROGRESS NOTES
10/29/24 0846   Team Meeting   Meeting Type Daily Rounds   Team Members Present   Team Members Present Physician;;Nurse   Physician Team Member Aria   Nursing Team Member [facility]   Care Management Team Member Noa   Patient/Family Present   Patient Present No   Patient's Family Present No     Pt is medication and meal compliant. Pt denies SI/HI/AVH. Pt is visible on the unit and social with select peers. Pt is scheduled to do a day pass and go to social security office. Pt's discharge is pending placement.

## 2024-10-29 NOTE — NURSING NOTE
"Pt is isolative to room, pleasant upon approach. Pt states that they are \"good\" but doesn't go into much detail. Medication and meal compliant. Denies SI, HI, AH, and VH. Denies any needs at this time.   "

## 2024-10-29 NOTE — PROGRESS NOTES
"Progress Note - Behavioral Health   Name: Franco Roberson 39 y.o. male I MRN: [id number]  Unit/Bed#: -02 I Date of Admission: 5/14/2024   Date of Service: 10/29/2024 I Hospital Day: 168     Assessment & Plan  MDD (major depressive disorder), recurrent severe, without psychosis (HCC)  No medications changes at this time, will continue to monitor and optimize as indicated:  Abilify 5 mg daily as mood adjunct   Zoloft 150 mg daily for depression and anxiety  Continue to encourage participation in group therapy, milieu therapy and occupational therapy.  Continue to assess for side effects of medications.  Continue collaboration with SLIM for medical co-morbidities as indicated.  Continue discussion with CM/SW to assist with obtaining collateral, disposition planning, and the implementation of patient-centered individualized plan of care.  Continue frequent safety checks and vitals per unit protocol.    Risks, benefits and possible side effects of Medications: Risks, benefits, and possible side effects of medications have previously been explained. No new medications at this time.      Legal status: 201    Disposition: to be determined, pending SOAR application for potential group home placement. OT Cognitive Evaluation completed: \"Pt would benefit from discharge to a supportive environment that can provide checks for safety and compliance with IADLs. \"      No associated orders from this encounter found during lookback period of 72 hours.  Autism spectrum disorder  Continue supportive care    No associated orders from this encounter found during lookback period of 72 hours.      Current medications:  Current Facility-Administered Medications   Medication Dose Route Frequency Provider Last Rate    acetaminophen  650 mg Oral Q6H PRN Basilio Brown MD      acetaminophen  650 mg Oral Q4H PRN Basilio Brown MD      acetaminophen  975 mg Oral Q6H PRN Basilio Brown MD      aluminum-magnesium " hydroxide-simethicone  30 mL Oral Q4H PRN Basilio Brown MD      ARIPiprazole  5 mg Oral Daily Basilio Panda MD      Artificial Tears  1 drop Both Eyes Q3H PRN Basilio Brown MD      atorvastatin  10 mg Oral Daily With Dinner WALLY Baptiste      benztropine  1 mg Intramuscular Q4H PRN Max 6/day Basilio Brown MD      benztropine  1 mg Oral Q4H PRN Max 6/day Basilio Brown MD      Cholecalciferol  2,000 Units Oral Daily WALLY Baptiste      cyanocobalamin  1,000 mcg Oral Daily WALLY Baptiste      Diclofenac Sodium  2 g Topical 4x Daily PRN WALLY Baptiste      hydrOXYzine HCL  50 mg Oral Q6H PRN Max 4/day Basilio Brown MD      Or    diphenhydrAMINE  50 mg Intramuscular Q6H PRN Basilio Brown MD      diphenhydrAMINE-zinc acetate   Topical Daily PRN Raj Guerrero MD      famotidine  20 mg Oral BID WALLY Baptiste      hydrOXYzine HCL  100 mg Oral Q6H PRN Max 4/day Basilio Brown MD      Or    LORazepam  2 mg Intramuscular Q6H PRN Basilio Brown MD      hydrOXYzine HCL  25 mg Oral Q6H PRN Max 4/day Basilio Brown MD      levothyroxine  37.5 mcg Oral Early Morning WALLY Baptiste      melatonin  6 mg Oral HS PRN WALLY Gomez      methocarbamol  500 mg Oral Q6H PRN WALLY Baptiste      OLANZapine  5 mg Oral Q4H PRN Max 3/day Basilio Brown MD      Or    OLANZapine  2.5 mg Intramuscular Q4H PRN Max 3/day Baislio Brown MD      OLANZapine  5 mg Oral Q3H PRN Max 3/day Basilio Brown MD      Or    OLANZapine  5 mg Intramuscular Q3H PRN Max 3/day Basilio Brown MD      OLANZapine  2.5 mg Oral Q4H PRN Max 6/day Basilio Brown MD      polyethylene glycol  17 g Oral Daily PRN Basilio Brown MD      propranolol  10 mg Oral Q8H PRN Basilio Brown MD      senna-docusate sodium  1 tablet Oral Daily PRN Basilio Brown MD      sertraline   150 mg Oral Daily WALLY Gomez      traZODone  50 mg Oral HS PRN Basilio Brown MD         Risks / Benefits of Treatment:    Risks, benefits, and possible side effects of medications explained to patient and patient verbalizes understanding and agreement for treatment.    Subjective:    Behavior over the last 24 hours: unchanged.     The patient was evaluated this morning for continuity of care and no acute distress noted throughout the evaluation.  Over the past 24 hours staff noted the patient was calm, cooperative, social with select peers.      Patient was visited on unit for continuing care; chart reviewed and discussed with multidisciplinary treatment team.  On approach, the patient was calm, pleasant and cooperative. Franco states that his mood has been up and down but he still finds medications helpful. Denied any changes in mood, appetite, and energy level. No problem initiating and maintaining sleep.  Denied A/VH currently.  Denied SI/HI, intent or plan upon direct inquiry at this time.    Patient continues to be selectively interactive with staff and peers. No reports of aggression or self-injurious behavior on unit. No PRN medications used in the past 24 hours.    Patient accepted all offered medications and no adverse effects of medications noted or reported.    Sleep: slept better  Appetite: normal  Medication side effects: No   ROS: no complaints      Mental Status Examination:  Appearance:  age appropriate, casually dressed, looks stated age   Behavior:  pleasant, cooperative, calm   Speech:  scant, soft   Mood:  euthymic   Affect:  constricted   Thought Process:  organized, logical, coherent, goal directed, linear   Associations: intact associations   Thought Content:  no overt delusions   Perceptual Disturbances: no auditory hallucinations, no visual hallucinations, does not appear responding to internal stimuli   Risk Potential: Suicidal ideation - None at present  Homicidal  ideation - None at present  Potential for aggression - No   Sensorium:  oriented to person, place, and time/date   Memory:  recent and remote memory grossly intact   Consciousness:  alert and awake   Attention/Concentration: attention span and concentration are age appropriate   Insight:  limited   Judgment: limited   Gait/Station: normal gait/station   Motor Activity: no abnormal movements       Vital signs in last 24 hours:    Temp:  [96.5 °F (35.8 °C)-98 °F (36.7 °C)] 96.5 °F (35.8 °C)  HR:  [69-76] 69  BP: (109-115)/(70-73) 109/73  Resp:  [16] 16  SpO2:  [95 %-97 %] 97 %  O2 Device: None (Room air)         Laboratory results: I have personally reviewed all pertinent laboratory/tests results    Most Recent Labs:   Lab Results   Component Value Date    WBC 7.01 06/27/2024    RBC 4.54 06/27/2024    HGB 14.5 06/27/2024    HCT 44.4 06/27/2024     06/27/2024    RDW 12.0 06/27/2024    NEUTROABS 4.01 06/27/2024    SODIUM 139 07/10/2024    K 3.8 07/10/2024     07/10/2024    CO2 27 07/10/2024    BUN 16 07/10/2024    CREATININE 0.92 07/10/2024    GLUC 87 07/10/2024    CALCIUM 9.3 07/10/2024    AST 28 07/10/2024    ALT 36 07/10/2024    ALKPHOS 66 07/10/2024    TP 7.7 07/10/2024    ALB 4.4 07/10/2024    TBILI 0.55 07/10/2024    CHOLESTEROL 168 09/19/2024    HDL 42 09/19/2024    TRIG 137 09/19/2024    LDLCALC 99 09/19/2024    NONHDLC 126 09/19/2024    TRC6BNTAAHAF 6.241 (H) 09/10/2024    FREET4 0.56 (L) 09/10/2024    SYPHILISAB Non-reactive 05/15/2024       Progress Toward Goals: Franco is currently assessed as being at their baseline with continued need for medication management, supervision for safety and ADL’s. These services are not currently available in a less restrictive environment necessitating continued hospital stay.  Franco should remain on the unit until these services are available, due to likelihood of mental decompensation and readmission if discharged to an unsupervised setting.  Assertive  discharge planning and collaboration with multiple providers (inpatient and community based) remains ongoing.  Franco is currently awaiting for private pay group home vs [facility]      Counseling / Coordination of Care:    Patient's progress discussed with staff in treatment team meeting.  Medication changes reviewed with staff in treatment team meeting.  Patient's progress reviewed with nursing staff.  Medications, treatment progress and treatment plan reviewed with patient.  Medication changes discussed with patient.  Medication education provided to patient.  Patient's progress reviewed with case management staff.  Reassurance and supportive therapy provided.}    Lita Knapp, DO 10/29/24    This note was completed in part utilizing Dragon dictation Software. Grammatical, translation, syntax errors, random word insertions, spelling mistakes, and incomplete sentences may be an occasional consequence of this system secondary to software limitations with voice recognition, ambient noise, and hardware issues. If you have any questions or concerns about the content, text, or information contained within the body of this dictation, please contact the provider for clarification.

## 2024-10-29 NOTE — NURSING NOTE
Patient is calm and cooperative upon approach. Patient is isolative to self and room. Patient denies SI/HI/AH/Vh. Patient is compliant with meds and meals.

## 2024-10-30 PROCEDURE — 99232 SBSQ HOSP IP/OBS MODERATE 35: CPT

## 2024-10-30 RX ADMIN — FAMOTIDINE 20 MG: 20 TABLET ORAL at 17:10

## 2024-10-30 RX ADMIN — ATORVASTATIN CALCIUM 10 MG: 10 TABLET, FILM COATED ORAL at 17:10

## 2024-10-30 RX ADMIN — SERTRALINE HYDROCHLORIDE 150 MG: 100 TABLET ORAL at 08:46

## 2024-10-30 RX ADMIN — FAMOTIDINE 20 MG: 20 TABLET ORAL at 08:46

## 2024-10-30 RX ADMIN — ARIPIPRAZOLE 5 MG: 5 TABLET ORAL at 08:46

## 2024-10-30 RX ADMIN — LEVOTHYROXINE SODIUM 37.5 MCG: 125 TABLET ORAL at 06:16

## 2024-10-30 RX ADMIN — CHOLECALCIFEROL TAB 25 MCG (1000 UNIT) 2000 UNITS: 25 TAB at 08:46

## 2024-10-30 RX ADMIN — CYANOCOBALAMIN TAB 1000 MCG 1000 MCG: 1000 TAB at 08:46

## 2024-10-30 NOTE — NURSING NOTE
Patient is calm and cooperative upon approach. Patient is isolative to self and room. Patient denies SI/HI/AH/Vh. Patient is compliant with meds and meals   show

## 2024-10-30 NOTE — NURSING NOTE
"Pt visible throughout evening, quiet but social when approached. Pt has depressed affect but pleasant. Pt states, \"I know they're working on stuff, but I'll still be here for awhile.\" Nurse encouraging pt. Pt participated in music group and denies current unmet needs. Pt denies SI/HI/AH/VH.   "

## 2024-10-30 NOTE — PLAN OF CARE
Pt attending some groups, but attendance and participation has decreased. Pt appears flat and depressed.

## 2024-10-30 NOTE — PROGRESS NOTES
"Progress Note - Behavioral Health   Name: Franco Roberson 39 y.o. male I MRN: [id number]   Unit/Bed#: -02 I Date of Admission: 5/14/2024   Date of Service: 10/30/2024 I Hospital Day: 169         Assessment & Plan  MDD (major depressive disorder), recurrent severe, without psychosis (HCC)  No medications changes at this time, will continue to monitor and optimize as indicated:  Abilify 5 mg daily as mood adjunct   Zoloft 150 mg daily for depression and anxiety  Continue to encourage participation in group therapy, milieu therapy and occupational therapy.  Continue to assess for side effects of medications.  Continue collaboration with [facility] for medical co-morbidities as indicated.  Continue discussion with CM/SW to assist with obtaining collateral, disposition planning, and the implementation of patient-centered individualized plan of care.  Continue frequent safety checks and vitals per unit protocol.    Risks, benefits and possible side effects of Medications: Risks, benefits, and possible side effects of medications have previously been explained. No new medications at this time.      Legal status: 201    Disposition: to be determined, pending SOAR application for potential group home placement. OT Cognitive Evaluation completed: \"Pt would benefit from discharge to a supportive environment that can provide checks for safety and compliance with IADLs. \"      No associated orders from this encounter found during lookback period of 72 hours.  Autism spectrum disorder  Continue supportive care    No associated orders from this encounter found during lookback period of 72 hours.          Recommended Treatment:     Treatment plan and medication changes discussed and per the attending physician the plan is:     1.Continue with group therapy, milieu therapy and occupational therapy  2.Behavioral Health checks every 15 minutes  3.Continue frequent safety checks and vitals per unit protocol  4.Continue with [facility] medical " management as indicated  5.Continue with current medication regimen  6.Will review labs in the a.m.  7.Disposition Planning: Discharge planning and efforts remain ongoing     Planned medication and treatment changes:    All current active medications have been reviewed  Encourage group therapy, milieu therapy and occupational therapy  Behavioral Health checks for safety monitoring  Continue treatment with group therapy, milieu therapy and occupational therapy  Discharge planning  Patient has reached level of stabilization in the hospital, however, without medication management in place, patient is likely to decompensate rapidly, and becoming a risk of danger to self/others.  Continue current medications:    Current medications:  Current Facility-Administered Medications   Medication Dose Route Frequency Provider Last Rate    acetaminophen  650 mg Oral Q6H PRN Basilio Brown MD      acetaminophen  650 mg Oral Q4H PRN Basilio Brown MD      acetaminophen  975 mg Oral Q6H PRN Basilio Brown MD      aluminum-magnesium hydroxide-simethicone  30 mL Oral Q4H PRN Basilio Brown MD      ARIPiprazole  5 mg Oral Daily Basilio Panda MD      Artificial Tears  1 drop Both Eyes Q3H PRN Basilio Brown MD      atorvastatin  10 mg Oral Daily With Dinner WALLY Baptiste      benztropine  1 mg Intramuscular Q4H PRN Max 6/day Basilio Brown MD      benztropine  1 mg Oral Q4H PRN Max 6/day Basilio Brown MD      Cholecalciferol  2,000 Units Oral Daily WALLY Baptiste      cyanocobalamin  1,000 mcg Oral Daily WALLY Baptiste      Diclofenac Sodium  2 g Topical 4x Daily PRN WALLY Baptiste      hydrOXYzine HCL  50 mg Oral Q6H PRN Max 4/day Basilio Brown MD      Or    diphenhydrAMINE  50 mg Intramuscular Q6H PRN Basilio Brown MD      diphenhydrAMINE-zinc acetate   Topical Daily PRN Raj Guerrero MD      famotidine  20 mg Oral BID  "WALLY Baptiste      hydrOXYzine HCL  100 mg Oral Q6H PRN Max 4/day Basilio Brown MD      Or    LORazepam  2 mg Intramuscular Q6H PRN Basilio Brown MD      hydrOXYzine HCL  25 mg Oral Q6H PRN Max 4/day Basilio Brown MD      levothyroxine  37.5 mcg Oral Early Morning WALLY Baptiste      melatonin  6 mg Oral HS PRN WALLY Gomez      methocarbamol  500 mg Oral Q6H PRN WALLY Baptiste      OLANZapine  5 mg Oral Q4H PRN Max 3/day Basilio Brown MD      Or    OLANZapine  2.5 mg Intramuscular Q4H PRN Max 3/day Basilio Brown MD      OLANZapine  5 mg Oral Q3H PRN Max 3/day Basilio Brown MD      Or    OLANZapine  5 mg Intramuscular Q3H PRN Max 3/day Basilio Brown MD      OLANZapine  2.5 mg Oral Q4H PRN Max 6/day Basilio Brown MD      polyethylene glycol  17 g Oral Daily PRN Basilio Brown MD      propranolol  10 mg Oral Q8H PRN Basilio Brown MD      senna-docusate sodium  1 tablet Oral Daily PRN Basilio Brown MD      sertraline  150 mg Oral Daily WALLY Gomez      traZODone  50 mg Oral HS PRN Basilio Brown MD         Risks / Benefits of Treatment:    Risks, benefits, and possible side effects of medications explained to patient and patient verbalizes understanding and agreement for treatment.    Subjective:    Behavior over the last 24 hours: unchanged.     Per staff, Franco continues to be calm and cooperative on the unit.  He is attending groups and is meal and medication compliant.  Awaiting placement.  He is visible and occasionally social with peers.     Franco was seen in his room for psychiatric follow up. He is calm and cooperative with the interview but minimal.  He reports that his mood is \"pretty good\" but appears somewhat depressed.  He reports he has been passing time on the unit by going to groups and he started writing poetry.  He adamantly denies suicidal or homicidal " "ideation intent or plan . He contracts for safety on the unit.  He denies AH/VH and does not appear internally preoccupied.  Patient hygiene appears poor, encouraged to attend to ADLS regularly.       Sleep: normal  Appetite: normal  Medication side effects: No   ROS: no complaints    Mental Status Evaluation:    Appearance:  poor hygiene, bearded   Behavior:  cooperative, calm   Speech:  scant, soft   Mood:  \"Pretty Good\". Appears somewhat depressed   Affect:  blunted   Thought Process:  linear, concrete   Associations: intact associations   Thought Content:  no overt delusions   Perceptual Disturbances: none   Risk Potential: Suicidal ideation - None  Homicidal ideation - None  Potential for aggression - No   Sensorium:  oriented to person, place, time/date, and situation   Memory:  recent memory intact   Consciousness:  alert and awake   Attention/Concentration: attention span and concentration are age appropriate   Insight:  limited   Judgment: limited   Gait/Station: normal gait/station   Motor Activity: no abnormal movements     Vital signs in last 24 hours:    Temp:  [96.7 °F (35.9 °C)-97.2 °F (36.2 °C)] 97.2 °F (36.2 °C)  HR:  [73-76] 76  BP: (112-115)/(67-68) 115/68  Resp:  [16] 16  SpO2:  [96 %-97 %] 97 %  O2 Device: None (Room air)         Laboratory results: I have personally reviewed all pertinent laboratory/tests results    Results from the past 24 hours: No results found for this or any previous visit (from the past 24 hour(s)).  Most Recent Labs:   Lab Results   Component Value Date    WBC 7.01 06/27/2024    RBC 4.54 06/27/2024    HGB 14.5 06/27/2024    HCT 44.4 06/27/2024     06/27/2024    RDW 12.0 06/27/2024    NEUTROABS 4.01 06/27/2024    SODIUM 139 07/10/2024    K 3.8 07/10/2024     07/10/2024    CO2 27 07/10/2024    BUN 16 07/10/2024    CREATININE 0.92 07/10/2024    GLUC 87 07/10/2024    CALCIUM 9.3 07/10/2024    AST 28 07/10/2024    ALT 36 07/10/2024    ALKPHOS 66 07/10/2024    TP " 7.7 07/10/2024    ALB 4.4 07/10/2024    TBILI 0.55 07/10/2024    CHOLESTEROL 168 09/19/2024    HDL 42 09/19/2024    TRIG 137 09/19/2024    LDLCALC 99 09/19/2024    NONHDLC 126 09/19/2024    [id number] 6.241 (H) 09/10/2024    FREET4 0.56 (L) 09/10/2024    SYPHILISAB Non-reactive 05/15/2024       Suicide/Homicide Risk Assessment:    Risk of Harm to Self:   Nursing Suicide Risk Assessment Last 24 hours: C-SSRS Risk (Since Last Contact)  Calculated C-SSRS Risk Score (Since Last Contact): No Risk Indicated  Current Specific Risk Factors include: diagnosis of mood disorder  Protective Factors: no current suicidal ideation, ability to communicate with staff on the unit, able to contract for safety on the unit, taking medications as ordered on the unit, ability to adapt to change, ability to make plans for the future, responds to redirection  Based on today's assessment, Franco presents the following risk of harm to self: low    Risk of Harm to Others:  Nursing Homicide Risk Assessment: Violence Risk to Others: Denies within past 6 months  Current Specific Risk Factors include: multiple stressors, social difficulties  Protective Factors: no current homicidal ideation, no current psychotic symptoms, compliant with medications on the unit as ordered, compliant with unit milieu, follows staff redirection  Based on today's assessment, Franco presents the following risk of harm to others: low    The following interventions are recommended: Behavioral Health checks for safety monitoring, continued hospitalization on locked unit    Progress Toward Goals: progressing, attends groups, participates in milieu therapy, working on coping skills, discharge planning    Counseling / Coordination of Care:    Total floor / unit time spent today 15 minutes. Greater than 50% of total time was spent with the patient and / or family counseling and / or coordination of care. A description of counseling / coordination of care:    Administrative  Statements   I have spent a total time of 30 minutes in caring for this patient on the day of the visit/encounter including Diagnostic results, Documenting in the medical record, Reviewing / ordering tests, medicine, procedures  , Obtaining or reviewing history  , and Communicating with other healthcare professionals .    WALLY Gomez 10/30/24

## 2024-10-30 NOTE — NURSING NOTE
Patient is calm and cooperative upon approach. Patient is visible walking unit, but remains isolative to self. Patient denies SI/HI/AH/VH. Patient is compliant with meds and meals.

## 2024-10-30 NOTE — PROGRESS NOTES
10/30/24 0848   Team Meeting   Meeting Type Daily Rounds   Team Members Present   Team Members Present Physician;;Nurse   Physician Team Member Aria   Nursing Team Member [facility] Management Team Member Noa   Patient/Family Present   Patient Present No   Patient's Family Present No     Pt is medication and meal compliant. Pt is calm and cooperative. Pt is seclusive to his room. Pt is visible on the unit occasionally , and social with select peers. Pt's discharge is pending placement.

## 2024-10-31 PROCEDURE — 99232 SBSQ HOSP IP/OBS MODERATE 35: CPT | Performed by: PSYCHIATRY & NEUROLOGY

## 2024-10-31 RX ADMIN — ATORVASTATIN CALCIUM 10 MG: 10 TABLET, FILM COATED ORAL at 17:13

## 2024-10-31 RX ADMIN — CHOLECALCIFEROL TAB 25 MCG (1000 UNIT) 2000 UNITS: 25 TAB at 08:42

## 2024-10-31 RX ADMIN — FAMOTIDINE 20 MG: 20 TABLET ORAL at 08:42

## 2024-10-31 RX ADMIN — ARIPIPRAZOLE 5 MG: 5 TABLET ORAL at 08:42

## 2024-10-31 RX ADMIN — FAMOTIDINE 20 MG: 20 TABLET ORAL at 17:13

## 2024-10-31 RX ADMIN — LEVOTHYROXINE SODIUM 37.5 MCG: 125 TABLET ORAL at 05:03

## 2024-10-31 RX ADMIN — SERTRALINE HYDROCHLORIDE 150 MG: 100 TABLET ORAL at 08:42

## 2024-10-31 RX ADMIN — CYANOCOBALAMIN TAB 1000 MCG 1000 MCG: 1000 TAB at 08:42

## 2024-10-31 NOTE — PLAN OF CARE
Problem: Ineffective Coping  Goal: Participates in unit activities  Description: Interventions:  - Provide therapeutic environment   - Provide required programming   - Redirect inappropriate behaviors   Outcome: Progressing     Problem: Depression  Goal: Treatment Goal: Demonstrate behavioral control of depressive symptoms, verbalize feelings of improved mood/affect, and adopt new coping skills prior to discharge  Outcome: Progressing  Goal: Verbalize thoughts and feelings  Description: Interventions:  - Assess and re-assess patient's level of risk   - Engage patient in 1:1 interactions, daily, for a minimum of 15 minutes   - Encourage patient to express feelings, fears, frustrations, hopes   Outcome: Progressing  Goal: Refrain from harming self  Description: Interventions:  - Monitor patient closely, per order   - Supervise medication ingestion, monitor effects and side effects   Outcome: Progressing  Goal: Refrain from isolation  Description: Interventions:  - Develop a trusting relationship   - Encourage socialization   Outcome: Progressing     Problem: Anxiety  Goal: Anxiety is at manageable level  Description: Interventions:  - Assess and monitor patient's anxiety level.   - Monitor for signs and symptoms (heart palpitations, chest pain, shortness of breath, headaches, nausea, feeling jumpy, restlessness, irritable, apprehensive).   - Collaborate with interdisciplinary team and initiate plan and interventions as ordered.  - [place] patient to unit/surroundings  - Explain treatment plan  - Encourage participation in care  - Encourage verbalization of concerns/fears  - Identify coping mechanisms  - Assist in developing anxiety-reducing skills  - Administer/offer alternative therapies  - Limit or eliminate stimulants  Outcome: Progressing     Problem: DISCHARGE PLANNING - CARE MANAGEMENT  Goal: Discharge to post-acute care or home with appropriate resources  Description: INTERVENTIONS:  - Conduct assessment to  determine patient/family and health care team treatment goals, and need for post-acute services based on payer coverage, community resources, and patient preferences, and barriers to discharge  - Address psychosocial, clinical, and financial barriers to discharge as identified in assessment in conjunction with the patient/family and health care team  - Arrange appropriate level of post-acute services according to patient’s   needs and preference and payer coverage in collaboration with the physician and health care team  - Communicate with and update the patient/family, physician, and health care team regarding progress on the discharge plan  - Arrange appropriate transportation to post-acute venues  Outcome: Progressing     Problem: Knowledge Deficit  Goal: Patient/family/caregiver demonstrates understanding of disease process, treatment plan, medications, and discharge instructions  Description: Complete learning assessment and assess knowledge base.  Interventions:  - Provide teaching at level of understanding  - Provide teaching via preferred learning methods  Outcome: Progressing     Problem: SLEEP DISTURBANCE  Goal: Will exhibit normal sleeping pattern  Description: Interventions:  -  Assess the patients sleep pattern, noting recent changes  - Administer medication as ordered  - Decrease environmental stimuli, including noise, as appropriate during the night  - Encourage the patient to actively participate in unit groups and or exercise during the day to enhance ability to achieve adequate sleep at night  - Assess the patient, in the morning, encouraging a description of sleep experience  Outcome: Progressing

## 2024-10-31 NOTE — NURSING NOTE
"Patient is calm with a depressed affect, soft speech with greasy hair. Says he did not shower yesterday. Denies depression, anxiety, SI/HI/AVH. Withdrawn to room, says he is \"taking a break from groups.\" No unmet needs currently.   "

## 2024-10-31 NOTE — PROGRESS NOTES
"Progress Note - Behavioral Health   Name: Franco Roberson 39 y.o. male I MRN: [id number]  Unit/Bed#: -02 I Date of Admission: 5/14/2024   Date of Service: 10/31/2024 I Hospital Day: 170     Assessment & Plan  MDD (major depressive disorder), recurrent severe, without psychosis (HCC)  No medications changes at this time, will continue to monitor and optimize as indicated:  Abilify 5 mg daily as mood adjunct   Zoloft 150 mg daily for depression and anxiety  Continue to encourage participation in group therapy, milieu therapy and occupational therapy.  Continue to assess for side effects of medications.  Continue collaboration with [facility] for medical co-morbidities as indicated.  Continue discussion with CM/SW to assist with obtaining collateral, disposition planning, and the implementation of patient-centered individualized plan of care.  Continue frequent safety checks and vitals per unit protocol.    Risks, benefits and possible side effects of Medications: Risks, benefits, and possible side effects of medications have previously been explained. No new medications at this time.      Legal status: 201    Disposition: to be determined, pending SOAR application for potential group home placement. OT Cognitive Evaluation completed: \"Pt would benefit from discharge to a supportive environment that can provide checks for safety and compliance with IADLs. \"      No associated orders from this encounter found during lookback period of 72 hours.  Autism spectrum disorder  Continue supportive care    No associated orders from this encounter found during lookback period of 72 hours.      Current medications:  Current Facility-Administered Medications   Medication Dose Route Frequency Provider Last Rate    acetaminophen  650 mg Oral Q6H PRN Basilio Brown MD      acetaminophen  650 mg Oral Q4H PRN Basilio Brown MD      acetaminophen  975 mg Oral Q6H PRN Basilio Brown MD      aluminum-magnesium " hydroxide-simethicone  30 mL Oral Q4H PRN Basilio Brown MD      ARIPiprazole  5 mg Oral Daily Basilio Panda MD      Artificial Tears  1 drop Both Eyes Q3H PRN Basilio Brown MD      atorvastatin  10 mg Oral Daily With Dinner WALLY Baptiste      benztropine  1 mg Intramuscular Q4H PRN Max 6/day Basilio Brown MD      benztropine  1 mg Oral Q4H PRN Max 6/day Basilio Brown MD      Cholecalciferol  2,000 Units Oral Daily WALLY Baptiste      cyanocobalamin  1,000 mcg Oral Daily WALLY Baptiste      Diclofenac Sodium  2 g Topical 4x Daily PRN WALLY Baptiste      hydrOXYzine HCL  50 mg Oral Q6H PRN Max 4/day Basilio Brown MD      Or    diphenhydrAMINE  50 mg Intramuscular Q6H PRN Basilio Brown MD      diphenhydrAMINE-zinc acetate   Topical Daily PRN Raj Guerrero MD      famotidine  20 mg Oral BID WALLY Baptiste      hydrOXYzine HCL  100 mg Oral Q6H PRN Max 4/day Basilio Brown MD      Or    LORazepam  2 mg Intramuscular Q6H PRN Basilio Brown MD      hydrOXYzine HCL  25 mg Oral Q6H PRN Max 4/day Basilio Brown MD      levothyroxine  37.5 mcg Oral Early Morning WALLY Baptiste      melatonin  6 mg Oral HS PRN WALLY Gomez      methocarbamol  500 mg Oral Q6H PRN WALLY Baptiste      OLANZapine  5 mg Oral Q4H PRN Max 3/day Basilio Brown MD      Or    OLANZapine  2.5 mg Intramuscular Q4H PRN Max 3/day Basilio Brown MD      OLANZapine  5 mg Oral Q3H PRN Max 3/day Basilio Brown MD      Or    OLANZapine  5 mg Intramuscular Q3H PRN Max 3/day Basilio Brown MD      OLANZapine  2.5 mg Oral Q4H PRN Max 6/day Basilio Brown MD      polyethylene glycol  17 g Oral Daily PRN Basilio Brown MD      propranolol  10 mg Oral Q8H PRN Basilio Brown MD      senna-docusate sodium  1 tablet Oral Daily PRN Basilio Brown MD      sertraline   150 mg Oral Daily WALLY Gomez      traZODone  50 mg Oral HS PRN Basilio Brown MD         Risks / Benefits of Treatment:    Risks, benefits, and possible side effects of medications explained to patient and patient verbalizes understanding and agreement for treatment.    Subjective:    Behavior over the last 24 hours: unchanged.     The patient was evaluated this morning for continuity of care and no acute distress noted throughout the evaluation.  Over the past 24 hours staff noted the patient was cooperative and compliant, no behavioral changes, tomorrow he has a scheduled appointment with [employer] and will need a day pass.      Patient was visited on unit for continuing care; chart reviewed and discussed with multidisciplinary treatment team.  On approach, the patient was calm and cooperative.  Patient reporting that he feels good, no changes to his mood, he is looking forward to meeting with social security tomorrow.  Denied any changes in mood, appetite, and energy level. No problem initiating and maintaining sleep.  Denied A/VH currently.  Denied SI/HI, intent or plan upon direct inquiry at this time.    Patient continues to be selectively interactive with staff and peers. No reports of aggression or self-injurious behavior on unit. No PRN medications used in the past 24 hours.    Patient accepted all offered medications and no adverse effects of medications noted or reported.    Sleep: normal  Appetite: normal  Medication side effects: No   ROS: no complaints    Mental Status Examination:  Appearance:  age appropriate, casually dressed, looks stated age   Behavior:  pleasant, cooperative, calm   Speech:  normal rate and volume, scant   Mood:  euthymic   Affect:  constricted   Thought Process:  organized, logical, coherent, goal directed, linear   Associations: concrete associations   Thought Content:  no overt delusions   Perceptual Disturbances: no auditory hallucinations, no  visual hallucinations, does not appear responding to internal stimuli   Risk Potential: Suicidal ideation - None at present  Homicidal ideation - None at present  Potential for aggression - No   Sensorium:  oriented to person, place, and time/date   Memory:  recent and remote memory grossly intact   Consciousness:  alert and awake   Attention/Concentration: attention span and concentration are age appropriate   Insight:  limited   Judgment: limited   Gait/Station: normal gait/station   Motor Activity: no abnormal movements       Vital signs in last 24 hours:    Temp:  [97.1 °F (36.2 °C)-97.6 °F (36.4 °C)] 97.6 °F (36.4 °C)  HR:  [66-92] 66  BP: (124-128)/(74-79) 124/79  Resp:  [16] 16  SpO2:  [97 %-98 %] 97 %  O2 Device: None (Room air)         Laboratory results: I have personally reviewed all pertinent laboratory/tests results    Most Recent Labs:   Lab Results   Component Value Date    WBC 7.01 06/27/2024    RBC 4.54 06/27/2024    HGB 14.5 06/27/2024    HCT 44.4 06/27/2024     06/27/2024    RDW 12.0 06/27/2024    NEUTROABS 4.01 06/27/2024    SODIUM 139 07/10/2024    K 3.8 07/10/2024     07/10/2024    CO2 27 07/10/2024    BUN 16 07/10/2024    CREATININE 0.92 07/10/2024    GLUC 87 07/10/2024    CALCIUM 9.3 07/10/2024    AST 28 07/10/2024    ALT 36 07/10/2024    ALKPHOS 66 07/10/2024    TP 7.7 07/10/2024    ALB 4.4 07/10/2024    TBILI 0.55 07/10/2024    CHOLESTEROL 168 09/19/2024    HDL 42 09/19/2024    TRIG 137 09/19/2024    LDLCALC 99 09/19/2024    NONHDLC 126 09/19/2024    ATA4NDMCPTTQ 6.241 (H) 09/10/2024    FREET4 0.56 (L) 09/10/2024    SYPHILISAB Non-reactive 05/15/2024       Progress Toward Goals: Franco is currently assessed as being at their baseline with continued need for medication management, supervision for safety and ADL’s. These services are not currently available in a less restrictive environment necessitating continued hospital stay.  Franco should remain on the unit until these  services are available, due to likelihood of mental decompensation and readmission if discharged to an unsupervised setting.  Assertive discharge planning and collaboration with multiple providers (inpatient and community based) remains ongoing.  Franco is currently awaiting for private pay group [place] [facility] versus [facility].      Counseling / Coordination of Care:    Patient's progress discussed with staff in treatment team meeting.  Medication changes reviewed with staff in treatment team meeting.  Patient's progress reviewed with nursing staff.  Medications, treatment progress and treatment plan reviewed with patient.  Medication changes discussed with patient.  Medication education provided to patient.  Patient's progress reviewed with case management staff.  Reassurance and supportive therapy provided.}    Lita Knapp,  10/31/24    This note was completed in part utilizing Dragon dictation Software. Grammatical, translation, syntax errors, random word insertions, spelling mistakes, and incomplete sentences may be an occasional consequence of this system secondary to software limitations with voice recognition, ambient noise, and hardware issues. If you have any questions or concerns about the content, text, or information contained within the body of this dictation, please contact the provider for clarification.

## 2024-10-31 NOTE — PLAN OF CARE
Problem: Ineffective Coping  Goal: Participates in unit activities  Description: Interventions:  - Provide therapeutic environment   - Provide required programming   - Redirect inappropriate behaviors   Outcome: Progressing     Problem: Depression  Goal: Treatment Goal: Demonstrate behavioral control of depressive symptoms, verbalize feelings of improved mood/affect, and adopt new coping skills prior to discharge  Outcome: Progressing  Goal: Verbalize thoughts and feelings  Description: Interventions:  - Assess and re-assess patient's level of risk   - Engage patient in 1:1 interactions, daily, for a minimum of 15 minutes   - Encourage patient to express feelings, fears, frustrations, hopes   Outcome: Progressing  Goal: Refrain from harming self  Description: Interventions:  - Monitor patient closely, per order   - Supervise medication ingestion, monitor effects and side effects   Outcome: Progressing  Goal: Refrain from isolation  Description: Interventions:  - Develop a trusting relationship   - Encourage socialization   Outcome: Progressing     Problem: Anxiety  Goal: Anxiety is at manageable level  Description: Interventions:  - Assess and monitor patient's anxiety level.   - Monitor for signs and symptoms (heart palpitations, chest pain, shortness of breath, headaches, nausea, feeling jumpy, restlessness, irritable, apprehensive).   - Collaborate with interdisciplinary team and initiate plan and interventions as ordered.  - [place] patient to unit/surroundings  - Explain treatment plan  - Encourage participation in care  - Encourage verbalization of concerns/fears  - Identify coping mechanisms  - Assist in developing anxiety-reducing skills  - Administer/offer alternative therapies  - Limit or eliminate stimulants  Outcome: Progressing     Problem: Knowledge Deficit  Goal: Patient/family/caregiver demonstrates understanding of disease process, treatment plan, medications, and discharge  instructions  Description: Complete learning assessment and assess knowledge base.  Interventions:  - Provide teaching at level of understanding  - Provide teaching via preferred learning methods  Outcome: Progressing

## 2024-11-01 PROCEDURE — 99232 SBSQ HOSP IP/OBS MODERATE 35: CPT | Performed by: PSYCHIATRY & NEUROLOGY

## 2024-11-01 RX ADMIN — ATORVASTATIN CALCIUM 10 MG: 10 TABLET, FILM COATED ORAL at 18:30

## 2024-11-01 RX ADMIN — CHOLECALCIFEROL TAB 25 MCG (1000 UNIT) 2000 UNITS: 25 TAB at 09:00

## 2024-11-01 RX ADMIN — FAMOTIDINE 20 MG: 20 TABLET ORAL at 18:29

## 2024-11-01 RX ADMIN — SERTRALINE HYDROCHLORIDE 150 MG: 100 TABLET ORAL at 09:00

## 2024-11-01 RX ADMIN — CYANOCOBALAMIN TAB 1000 MCG 1000 MCG: 1000 TAB at 09:00

## 2024-11-01 RX ADMIN — ARIPIPRAZOLE 5 MG: 5 TABLET ORAL at 09:00

## 2024-11-01 RX ADMIN — LEVOTHYROXINE SODIUM 37.5 MCG: 125 TABLET ORAL at 06:50

## 2024-11-01 RX ADMIN — FAMOTIDINE 20 MG: 20 TABLET ORAL at 09:00

## 2024-11-01 NOTE — PLAN OF CARE
Problem: Ineffective Coping  Goal: Participates in unit activities  Description: Interventions:  - Provide therapeutic environment   - Provide required programming   - Redirect inappropriate behaviors   Outcome: Progressing     Problem: Depression  Goal: Treatment Goal: Demonstrate behavioral control of depressive symptoms, verbalize feelings of improved mood/affect, and adopt new coping skills prior to discharge  Outcome: Progressing  Goal: Verbalize thoughts and feelings  Description: Interventions:  - Assess and re-assess patient's level of risk   - Engage patient in 1:1 interactions, daily, for a minimum of 15 minutes   - Encourage patient to express feelings, fears, frustrations, hopes   Outcome: Progressing  Goal: Refrain from harming self  Description: Interventions:  - Monitor patient closely, per order   - Supervise medication ingestion, monitor effects and side effects   Outcome: Progressing  Goal: Refrain from isolation  Description: Interventions:  - Develop a trusting relationship   - Encourage socialization   Outcome: Progressing     Problem: Anxiety  Goal: Anxiety is at manageable level  Description: Interventions:  - Assess and monitor patient's anxiety level.   - Monitor for signs and symptoms (heart palpitations, chest pain, shortness of breath, headaches, nausea, feeling jumpy, restlessness, irritable, apprehensive).   - Collaborate with interdisciplinary team and initiate plan and interventions as ordered.  - [place] patient to unit/surroundings  - Explain treatment plan  - Encourage participation in care  - Encourage verbalization of concerns/fears  - Identify coping mechanisms  - Assist in developing anxiety-reducing skills  - Administer/offer alternative therapies  - Limit or eliminate stimulants  Outcome: Progressing     Problem: DISCHARGE PLANNING - CARE MANAGEMENT  Goal: Discharge to post-acute care or home with appropriate resources  Description: INTERVENTIONS:  - Conduct assessment to  determine patient/family and health care team treatment goals, and need for post-acute services based on payer coverage, community resources, and patient preferences, and barriers to discharge  - Address psychosocial, clinical, and financial barriers to discharge as identified in assessment in conjunction with the patient/family and health care team  - Arrange appropriate level of post-acute services according to patient’s   needs and preference and payer coverage in collaboration with the physician and health care team  - Communicate with and update the patient/family, physician, and health care team regarding progress on the discharge plan  - Arrange appropriate transportation to post-acute venues  Outcome: Progressing     Problem: Knowledge Deficit  Goal: Patient/family/caregiver demonstrates understanding of disease process, treatment plan, medications, and discharge instructions  Description: Complete learning assessment and assess knowledge base.  Interventions:  - Provide teaching at level of understanding  - Provide teaching via preferred learning methods  Outcome: Progressing     Problem: SLEEP DISTURBANCE  Goal: Will exhibit normal sleeping pattern  Description: Interventions:  -  Assess the patients sleep pattern, noting recent changes  - Administer medication as ordered  - Decrease environmental stimuli, including noise, as appropriate during the night  - Encourage the patient to actively participate in unit groups and or exercise during the day to enhance ability to achieve adequate sleep at night  - Assess the patient, in the morning, encouraging a description of sleep experience  Outcome: Progressing

## 2024-11-01 NOTE — PROGRESS NOTES
"Progress Note - Behavioral Health   Name: Franco Roberson 39 y.o. male I MRN: [id number]  Unit/Bed#: -02 I Date of Admission: 5/14/2024   Date of Service: 11/1/2024 I Hospital Day: 171     Assessment & Plan  MDD (major depressive disorder), recurrent severe, without psychosis (HCC)  No medications changes at this time, will continue to monitor and optimize as indicated:  Abilify 5 mg daily as mood adjunct   Zoloft 150 mg daily for depression and anxiety  Continue to encourage participation in group therapy, milieu therapy and occupational therapy.  Continue to assess for side effects of medications.  Continue collaboration with [facility] for medical co-morbidities as indicated.  Continue discussion with CM/SW to assist with obtaining collateral, disposition planning, and the implementation of patient-centered individualized plan of care.  Continue frequent safety checks and vitals per unit protocol.    Risks, benefits and possible side effects of Medications: Risks, benefits, and possible side effects of medications have previously been explained. No new medications at this time.      Legal status: 201    Disposition: to be determined, pending SOAR application for potential group home placement. OT Cognitive Evaluation completed: \"Pt would benefit from discharge to a supportive environment that can provide checks for safety and compliance with IADLs. \"      No associated orders from this encounter found during lookback period of 72 hours.  Autism spectrum disorder  Continue supportive care    No associated orders from this encounter found during lookback period of 72 hours.      Current medications:  Current Facility-Administered Medications   Medication Dose Route Frequency Provider Last Rate    acetaminophen  650 mg Oral Q6H PRN Basilio Brown MD      acetaminophen  650 mg Oral Q4H PRN aBsilio Brown MD      acetaminophen  975 mg Oral Q6H PRN Basilio Brown MD      aluminum-magnesium " hydroxide-simethicone  30 mL Oral Q4H PRN Basilio Brown MD      ARIPiprazole  5 mg Oral Daily Basilio Panda MD      Artificial Tears  1 drop Both Eyes Q3H PRN Basilio Brown MD      atorvastatin  10 mg Oral Daily With Dinner WALLY Baptiste      benztropine  1 mg Intramuscular Q4H PRN Max 6/day Basilio Brown MD      benztropine  1 mg Oral Q4H PRN Max 6/day Basilio Brown MD      Cholecalciferol  2,000 Units Oral Daily WALLY Baptiste      cyanocobalamin  1,000 mcg Oral Daily WALLY Baptiste      Diclofenac Sodium  2 g Topical 4x Daily PRN WALLY Baptiste      hydrOXYzine HCL  50 mg Oral Q6H PRN Max 4/day Basilio Brown MD      Or    diphenhydrAMINE  50 mg Intramuscular Q6H PRN Basilio Brown MD      diphenhydrAMINE-zinc acetate   Topical Daily PRN Raj Guerrero MD      famotidine  20 mg Oral BID WALLY Baptiste      hydrOXYzine HCL  100 mg Oral Q6H PRN Max 4/day Basilio Brown MD      Or    LORazepam  2 mg Intramuscular Q6H PRN Basilio Brown MD      hydrOXYzine HCL  25 mg Oral Q6H PRN Max 4/day Basilio Brown MD      levothyroxine  37.5 mcg Oral Early Morning WALLY Baptiste      melatonin  6 mg Oral HS PRN WALLY Gomez      methocarbamol  500 mg Oral Q6H PRN WALLY Baptiste      OLANZapine  5 mg Oral Q4H PRN Max 3/day Basilio Brown MD      Or    OLANZapine  2.5 mg Intramuscular Q4H PRN Max 3/day Basilio Brown MD      OLANZapine  5 mg Oral Q3H PRN Max 3/day Basilio Brown MD      Or    OLANZapine  5 mg Intramuscular Q3H PRN Max 3/day Basilio Brown MD      OLANZapine  2.5 mg Oral Q4H PRN Max 6/day Basilio Brown MD      polyethylene glycol  17 g Oral Daily PRN Basilio Brown MD      propranolol  10 mg Oral Q8H PRN Basilio Brown MD      senna-docusate sodium  1 tablet Oral Daily PRN Basilio Brown MD      sertraline   150 mg Oral Daily WALLY Gomez      traZODone  50 mg Oral HS PRN Basilio Brown MD         Risks / Benefits of Treatment:    Risks, benefits, and possible side effects of medications explained to patient and patient verbalizes understanding and agreement for treatment.    Subjective:    Behavior over the last 24 hours: unchanged.     The patient was evaluated this morning for continuity of care and no acute distress noted throughout the evaluation.  Over the past 24 hours staff noted the patient was calm, cooperative, withdrawn at times.      Patient was visited on unit for continuing care; chart reviewed and discussed with multidisciplinary treatment team.  On approach, the patient was calm and cooperative.  Franco states that he was feeling some anxiety about leaving the hospital and going to the Social Security office.  He states that the anxiety pass and he is now looking forward to the time off the unit.  Denied any changes in mood, appetite, and energy level. No problem initiating and maintaining sleep.  Denied A/VH currently.  Denied SI/HI, intent or plan upon direct inquiry at this time.    Patient continues to be visible in the milieu and interacts with staff and peers. No reports of aggression or self-injurious behavior on unit. No PRN medications used in the past 24 hours.    Patient accepted all offered medications and no adverse effects of medications noted or reported.    Sleep: normal  Appetite: normal  Medication side effects: No   ROS: no complaints    Mental Status Examination:  Appearance:  age appropriate, casually dressed, looks stated age   Behavior:  pleasant, cooperative, calm   Speech:  scant, soft   Mood:  euthymic   Affect:  constricted   Thought Process:  organized, linear   Associations: concrete associations   Thought Content:  no overt delusions   Perceptual Disturbances: no auditory hallucinations, no visual hallucinations, does not appear responding to internal  stimuli   Risk Potential: Suicidal ideation - None at present  Homicidal ideation - None at present  Potential for aggression - No   Sensorium:  oriented to person, place, and time/date   Memory:  recent and remote memory grossly intact   Consciousness:  alert and awake   Attention/Concentration: attention span and concentration are age appropriate   Insight:  limited   Judgment: limited   Gait/Station: normal gait/station   Motor Activity: no abnormal movements       Vital signs in last 24 hours:    Temp:  [96.5 °F (35.8 °C)-96.9 °F (36.1 °C)] 96.9 °F (36.1 °C)  HR:  [63-80] 63  BP: (114-130)/(68-72) 114/72  Resp:  [16] 16  SpO2:  [97 %] 97 %  O2 Device: None (Room air)         Laboratory results: I have personally reviewed all pertinent laboratory/tests results    Most Recent Labs:   Lab Results   Component Value Date    WBC 7.01 06/27/2024    RBC 4.54 06/27/2024    HGB 14.5 06/27/2024    HCT 44.4 06/27/2024     06/27/2024    RDW 12.0 06/27/2024    NEUTROABS 4.01 06/27/2024    SODIUM 139 07/10/2024    K 3.8 07/10/2024     07/10/2024    CO2 27 07/10/2024    BUN 16 07/10/2024    CREATININE 0.92 07/10/2024    GLUC 87 07/10/2024    CALCIUM 9.3 07/10/2024    AST 28 07/10/2024    ALT 36 07/10/2024    ALKPHOS 66 07/10/2024    TP 7.7 07/10/2024    ALB 4.4 07/10/2024    TBILI 0.55 07/10/2024    CHOLESTEROL 168 09/19/2024    HDL 42 09/19/2024    TRIG 137 09/19/2024    LDLCALC 99 09/19/2024    NONHDLC 126 09/19/2024    PLX2FAABGRSS 6.241 (H) 09/10/2024    FREET4 0.56 (L) 09/10/2024    SYPHILISAB Non-reactive 05/15/2024       Progress Toward Goals: Franco is currently assessed as being at their baseline with continued need for medication management, supervision for safety and ADL’s. These services are not currently available in a less restrictive environment necessitating continued hospital stay.  Franco should remain on the unit until these services are available, due to likelihood of mental decompensation and  readmission if discharged to an unsupervised setting.  Assertive discharge planning and collaboration with multiple providers (inpatient and community based) remains ongoing.  Franco is currently awaiting for private pay group home versus [facility]      Counseling / Coordination of Care:    Patient's progress discussed with staff in treatment team meeting.  Medication changes reviewed with staff in treatment team meeting.  Patient's progress reviewed with nursing staff.  Medications, treatment progress and treatment plan reviewed with patient.  Medication changes discussed with patient.  Medication education provided to patient.  Patient's progress reviewed with case management staff.  Reassurance and supportive therapy provided.}    Lita Knapp, DO 11/01/24    This note was completed in part utilizing Dragon dictation Software. Grammatical, translation, syntax errors, random word insertions, spelling mistakes, and incomplete sentences may be an occasional consequence of this system secondary to software limitations with voice recognition, ambient noise, and hardware issues. If you have any questions or concerns about the content, text, or information contained within the body of this dictation, please contact the provider for clarification.

## 2024-11-01 NOTE — NURSING NOTE
Patient is pleasant, cooperative, vocalizes he is anxious and excited about going to the social security administration with staff to fill out necessary paperwork. He denies any AVH/SI/HI he is flat but brightens upon approach. He is visible on milieu. Cooperative with medication. This RN does not believe he presents any concerns for elopement in neither behavior, words, or actions.

## 2024-11-01 NOTE — PROGRESS NOTES
11/01/24 0843   Team Meeting   Meeting Type Daily Rounds   Team Members Present   Team Members Present Physician;Nurse;   Physician Team Member Aria   [facility]   Care Management Team Member Noa   Patient/Family Present   Patient Present No   Patient's Family Present No     Pt is medication and meal compliant. Pt denies SI/HI/AVH. Pt is going on a day pass today at 1 pm. Pt is going to social security office. Pt's discharge is pending placement.

## 2024-11-01 NOTE — PLAN OF CARE
Pt attends some groups and with minimal participation. Pt typically sits quietly, however, appropriate when prompted.

## 2024-11-01 NOTE — NURSING NOTE
Patient is calm and cooperative. Patient is visible on unit, isolative to self. Patient denies SI/HI/AH/VH. Patient is compliant with meds and meals.

## 2024-11-01 NOTE — CASE MANAGEMENT
"Pt had his phone interview with [facility] at 11am this morning. [facility] representative insisted that patient is now a resident of [place] and has to apply for benefits through the [place] office since he has been hospitalized for 5 months. Writer explained that patient has no intention of residing in [place], has residing in [place] almost his entire life, his last known residence was [place], and he has no friends/family/supports in [place]. She stopped his interview and rescheduled it with the [facility] office online for  at 9am. They will contact patient at [phone].       Writer and patient went to the [employer] office. Staff at the [employer] office would not allow for patient to verify his identity using his  Photo ID and a sworn statement from this writer, as has previously been accepted by [facility].   \"Salo\" from [facility] [employer] office also insisted patient is now a [place] resident, who should no longer be receiving [place] Medical assistance, despite patient having no established residency in [place]. He stated patient will not be able to gain access to Social Security online access unless he is able to obtain and return with valid, current photo identification. He also suggested that patient will have to apply using a [place] address, cancel his [place] Medicaid, and change is residency to [place].     Writer reached out to Felipe Larkin at [facility] in hopes of gaining assistance. Awaiting response.     Writer and patient sat together and initiated his 's license renewal and ordered his 's license to be mailed to the hospital. Pt attempted to have a birth certificate mailed to the hospital, however, this could not be done because the hospital's address did not match the address of his credit card- the order was rejected.    Pt is going to ask his friend again if it would be possible for this writer or pt's assigned CM to speak with his father and discuss the purpose and importance of temporarily utilizing their address " as a mailing address only.     Pt always thanks writer for assisting him. He is polite, appropriate. Pt is quiet, soft-spoken.      Will continue to follow up.

## 2024-11-02 PROCEDURE — 99232 SBSQ HOSP IP/OBS MODERATE 35: CPT | Performed by: STUDENT IN AN ORGANIZED HEALTH CARE EDUCATION/TRAINING PROGRAM

## 2024-11-02 RX ADMIN — SERTRALINE HYDROCHLORIDE 150 MG: 100 TABLET ORAL at 10:32

## 2024-11-02 RX ADMIN — CYANOCOBALAMIN TAB 1000 MCG 1000 MCG: 1000 TAB at 10:32

## 2024-11-02 RX ADMIN — ATORVASTATIN CALCIUM 10 MG: 10 TABLET, FILM COATED ORAL at 18:24

## 2024-11-02 RX ADMIN — LEVOTHYROXINE SODIUM 37.5 MCG: 125 TABLET ORAL at 06:20

## 2024-11-02 RX ADMIN — FAMOTIDINE 20 MG: 20 TABLET ORAL at 10:32

## 2024-11-02 RX ADMIN — ARIPIPRAZOLE 5 MG: 5 TABLET ORAL at 10:32

## 2024-11-02 RX ADMIN — FAMOTIDINE 20 MG: 20 TABLET ORAL at 18:24

## 2024-11-02 RX ADMIN — CHOLECALCIFEROL TAB 25 MCG (1000 UNIT) 2000 UNITS: 25 TAB at 10:32

## 2024-11-02 NOTE — PROGRESS NOTES
"Progress Note - Behavioral Health   Name: Franco Roberson 39 y.o. male I MRN: [id number]   Unit/Bed#: -02 I Date of Admission: 5/14/2024   Date of Service: 11/2/2024 I Hospital Day: 172         Assessment & Plan  MDD (major depressive disorder), recurrent severe, without psychosis (HCC)  No medications changes at this time, will continue to monitor and optimize as indicated:  Abilify 5 mg daily as mood adjunct   Zoloft 150 mg daily for depression and anxiety  Continue to encourage participation in group therapy, milieu therapy and occupational therapy.  Continue to assess for side effects of medications.  Continue collaboration with [facility] for medical co-morbidities as indicated.  Continue discussion with CM/SW to assist with obtaining collateral, disposition planning, and the implementation of patient-centered individualized plan of care.  Continue frequent safety checks and vitals per unit protocol.    Risks, benefits and possible side effects of Medications: Risks, benefits, and possible side effects of medications have previously been explained. No new medications at this time.      Legal status: 201    Disposition: to be determined, pending SOAR application for potential group home placement. OT Cognitive Evaluation completed: \"Pt would benefit from discharge to a supportive environment that can provide checks for safety and compliance with IADLs. \"      No associated orders from this encounter found during lookback period of 72 hours.  Autism spectrum disorder  Continue supportive care    No associated orders from this encounter found during lookback period of 72 hours.          Recommended Treatment:     Treatment plan and medication changes discussed and per the attending physician the plan is:     1.Continue with group therapy, milieu therapy and occupational therapy  2.Behavioral Health checks every 15 minutes  3.Continue frequent safety checks and vitals per unit protocol  4.Continue with [facility] medical " management as indicated  5.Continue with current medication regimen  6.Will review labs in the a.m.  7.Disposition Planning: Discharge planning and efforts remain ongoing     Planned medication and treatment changes:    All current active medications have been reviewed  Encourage group therapy, milieu therapy and occupational therapy  Behavioral Health checks for safety monitoring  Continue treatment with group therapy, milieu therapy and occupational therapy  Discharge planning  Await placement  This patient is stable and ready for discharge, however, even at baseline, patient continues with poor insight, judgement and coping that pose a risk to patient in a homeless shelter, or otherwise unsupervised setting. Case management is assertively/actively working on securing the necessary level of care.  Continue current medications:    Current medications:  Current Facility-Administered Medications   Medication Dose Route Frequency Provider Last Rate    acetaminophen  650 mg Oral Q6H PRN Basilio Brown MD      acetaminophen  650 mg Oral Q4H PRN Basilio Brown MD      acetaminophen  975 mg Oral Q6H PRN Basilio Brown MD      aluminum-magnesium hydroxide-simethicone  30 mL Oral Q4H PRN Basilio Brown MD      ARIPiprazole  5 mg Oral Daily Basilio Panda MD      Artificial Tears  1 drop Both Eyes Q3H PRN Basilio Brown MD      atorvastatin  10 mg Oral Daily With Dinner WALLY Baptiste      benztropine  1 mg Intramuscular Q4H PRN Max 6/day Basilio Brown MD      benztropine  1 mg Oral Q4H PRN Max 6/day Basilio Brown MD      Cholecalciferol  2,000 Units Oral Daily WALLY Baptiste      cyanocobalamin  1,000 mcg Oral Daily WALLY Baptiste      Diclofenac Sodium  2 g Topical 4x Daily PRN WALLY Baptiste      hydrOXYzine HCL  50 mg Oral Q6H PRN Max 4/day Basilio Brown MD      Or    diphenhydrAMINE  50 mg Intramuscular Q6H PRN  Basilio Brown MD      diphenhydrAMINE-zinc acetate   Topical Daily PRN Raj Guerrero MD      famotidine  20 mg Oral BID WALLY Baptiste      hydrOXYzine HCL  100 mg Oral Q6H PRN Max 4/day Basilio Brown MD      Or    LORazepam  2 mg Intramuscular Q6H PRN Basilio Brown MD      hydrOXYzine HCL  25 mg Oral Q6H PRN Max 4/day Basilio Brown MD      levothyroxine  37.5 mcg Oral Early Morning WALLY Baptiste      melatonin  6 mg Oral HS PRN WALLY Gomez      methocarbamol  500 mg Oral Q6H PRN WALLY Baptiste      OLANZapine  5 mg Oral Q4H PRN Max 3/day Basilio Brown MD      Or    OLANZapine  2.5 mg Intramuscular Q4H PRN Max 3/day Basilio Brown MD      OLANZapine  5 mg Oral Q3H PRN Max 3/day Basilio Brown MD      Or    OLANZapine  5 mg Intramuscular Q3H PRN Max 3/day Basilio Brown MD      OLANZapine  2.5 mg Oral Q4H PRN Max 6/day Basilio Brown MD      polyethylene glycol  17 g Oral Daily PRN Basilio Brown MD      propranolol  10 mg Oral Q8H PRN Basilio Brown MD      senna-docusate sodium  1 tablet Oral Daily PRN Basilio Brown MD      sertraline  150 mg Oral Daily WALLY Gomez      traZODone  50 mg Oral HS PRN Basilio Brown MD         Risks / Benefits of Treatment:    Risks, benefits, and possible side effects of medications explained to patient and patient verbalizes understanding and agreement for treatment.    Subjective:    Behavior over the last 24 hours: unchanged.     Per staff, Franco remains calm, pleasant and cooperative on the unit.  He has been attending groups.  He is quiet and withdrawn at times.  He went on SOPHIE with SW yesterday to  administration office.     Farnco was seen in his room for psychiatric follow up today.  Franco is calm and pleasant during the interview.  He is soft spoken and minimal during the interview but answers all questions appropriately.  He  reports that his SOPHIE was frustrating yesterday but he was happy to leave the unit for little bit.  He states that he remains hopeful for discharge as he was able to work on getting his  license yesterday.  He is denying any depression or anxiety today and feels his medications continue to work well for him.  He denies any side effects from medications.  He denies suicidal or homicidal ideation, intent or plan.  He denies AH/VH and does not present as internally preoccupied.     Sleep: normal  Appetite: normal  Medication side effects: No   ROS: no complaints    Mental Status Evaluation:    Appearance:  casually dressed, marginal hygiene, looks stated age   Behavior:  pleasant, cooperative, calm   Speech:  normal rate, soft   Mood:  mildly depressed   Affect:  constricted   Thought Process:  goal directed, linear   Associations: intact associations   Thought Content:  no overt delusions   Perceptual Disturbances: denies auditory or visual hallucinations when asked, does not appear responding to internal stimuli   Risk Potential: Suicidal ideation - None  Homicidal ideation - None  Potential for aggression - No   Sensorium:  oriented to person, place, time/date, and situation   Memory:  recent memory intact   Consciousness:  alert and awake   Attention/Concentration: attention span and concentration are age appropriate   Insight:  limited   Judgment: limited   Gait/Station: normal gait/station   Motor Activity: no abnormal movements     Vital signs in last 24 hours:    Temp:  [96.9 °F (36.1 °C)-97 °F (36.1 °C)] 96.9 °F (36.1 °C)  HR:  [70-80] 70  BP: (131-134)/(76-77) 131/76  Resp:  [17-18] 18  SpO2:  [97 %-98 %] 98 %  O2 Device: None (Room air)         Laboratory results: I have personally reviewed all pertinent laboratory/tests results    Results from the past 24 hours: No results found for this or any previous visit (from the past 24 hour(s)).  Most Recent Labs:   Lab Results   Component Value Date    WBC  7.01 06/27/2024    RBC 4.54 06/27/2024    HGB 14.5 06/27/2024    HCT 44.4 06/27/2024     06/27/2024    RDW 12.0 06/27/2024    NEUTROABS 4.01 06/27/2024    SODIUM 139 07/10/2024    K 3.8 07/10/2024     07/10/2024    CO2 27 07/10/2024    BUN 16 07/10/2024    CREATININE 0.92 07/10/2024    GLUC 87 07/10/2024    CALCIUM 9.3 07/10/2024    AST 28 07/10/2024    ALT 36 07/10/2024    ALKPHOS 66 07/10/2024    TP 7.7 07/10/2024    ALB 4.4 07/10/2024    TBILI 0.55 07/10/2024    CHOLESTEROL 168 09/19/2024    HDL 42 09/19/2024    TRIG 137 09/19/2024    LDLCALC 99 09/19/2024    NONHDLC 126 09/19/2024    XSB5CUXXELBS 6.241 (H) 09/10/2024    FREET4 0.56 (L) 09/10/2024    SYPHILISAB Non-reactive 05/15/2024       Suicide/Homicide Risk Assessment:    Risk of Harm to Self:   Nursing Suicide Risk Assessment Last 24 hours: C-SSRS Risk (Since Last Contact)  Calculated C-SSRS Risk Score (Since Last Contact): No Risk Indicated  Current Specific Risk Factors include: diagnosis of mood disorder, lack of support  Protective Factors: no current suicidal ideation, ability to communicate with staff on the unit, able to contract for safety on the unit, taking medications as ordered on the unit, improved depressive symptoms, improved anxiety symptoms, responds to redirection  Based on today's assessmentFranco presents the following risk of harm to self: low    Risk of Harm to Others:  Nursing Homicide Risk Assessment: Violence Risk to Others: Denies within past 6 months  Current Specific Risk Factors include: social difficulties  Protective Factors: no current homicidal ideation, able to communicate with staff on the unit, mood is stabilizing, no current psychotic symptoms, compliant with medications on the unit as ordered, compliant with unit milieu, follows staff redirection  Based on today's assessmentFranco presents the following risk of harm to others: low    The following interventions are recommended: Behavioral Health checks  for safety monitoring, continued hospitalization on locked unit    Progress Toward Goals: progressing, attends groups, depression is improving, discharge planning, placement pending    Counseling / Coordination of Care:    Total floor / unit time spent today 15 minutes. Greater than 50% of total time was spent with the patient and / or family counseling and / or coordination of care. A description of counseling / coordination of care:    Administrative Statements   I have spent a total time of 30 minutes in caring for this patient on the day of the visit/encounter including Diagnostic results, Documenting in the medical record, Reviewing / ordering tests, medicine, procedures  , Obtaining or reviewing history  , and Communicating with other healthcare professionals .    WALLY Gomez 11/02/24

## 2024-11-02 NOTE — NURSING NOTE
Patient was assessed in room during medication administration, patient reports no AVH/SI/HI. Patient has had no behavioral issues to note. Patient is visible on milieu for breakfast. Patient has been med compliant. Patient states they have no further needs at this time.

## 2024-11-02 NOTE — PLAN OF CARE
Problem: Ineffective Coping  Goal: Participates in unit activities  Description: Interventions:  - Provide therapeutic environment   - Provide required programming   - Redirect inappropriate behaviors   Outcome: Progressing     Problem: Depression  Goal: Treatment Goal: Demonstrate behavioral control of depressive symptoms, verbalize feelings of improved mood/affect, and adopt new coping skills prior to discharge  Outcome: Progressing  Goal: Verbalize thoughts and feelings  Description: Interventions:  - Assess and re-assess patient's level of risk   - Engage patient in 1:1 interactions, daily, for a minimum of 15 minutes   - Encourage patient to express feelings, fears, frustrations, hopes   Outcome: Progressing  Goal: Refrain from harming self  Description: Interventions:  - Monitor patient closely, per order   - Supervise medication ingestion, monitor effects and side effects   Outcome: Progressing  Goal: Refrain from isolation  Description: Interventions:  - Develop a trusting relationship   - Encourage socialization   Outcome: Progressing     Problem: Anxiety  Goal: Anxiety is at manageable level  Description: Interventions:  - Assess and monitor patient's anxiety level.   - Monitor for signs and symptoms (heart palpitations, chest pain, shortness of breath, headaches, nausea, feeling jumpy, restlessness, irritable, apprehensive).   - Collaborate with interdisciplinary team and initiate plan and interventions as ordered.  - [place] patient to unit/surroundings  - Explain treatment plan  - Encourage participation in care  - Encourage verbalization of concerns/fears  - Identify coping mechanisms  - Assist in developing anxiety-reducing skills  - Administer/offer alternative therapies  - Limit or eliminate stimulants  Outcome: Progressing     Problem: Knowledge Deficit  Goal: Patient/family/caregiver demonstrates understanding of disease process, treatment plan, medications, and discharge  instructions  Description: Complete learning assessment and assess knowledge base.  Interventions:  - Provide teaching at level of understanding  - Provide teaching via preferred learning methods  Outcome: Progressing     Problem: SLEEP DISTURBANCE  Goal: Will exhibit normal sleeping pattern  Description: Interventions:  -  Assess the patients sleep pattern, noting recent changes  - Administer medication as ordered  - Decrease environmental stimuli, including noise, as appropriate during the night  - Encourage the patient to actively participate in unit groups and or exercise during the day to enhance ability to achieve adequate sleep at night  - Assess the patient, in the morning, encouraging a description of sleep experience  Outcome: Progressing

## 2024-11-02 NOTE — NURSING NOTE
Pt visible on unit, pleasant and brightens on approach. Denies SI/HI/AVH. Denies pain and anxiety. Pt remains hopeful about situation with [facility]; states he has another appointment to try to get things done in the future. States he napped this afternoon so he expects to be awake for a bit tonight. Compliant with unit routines and care. Safety checks ongoing.

## 2024-11-03 PROCEDURE — 99232 SBSQ HOSP IP/OBS MODERATE 35: CPT | Performed by: STUDENT IN AN ORGANIZED HEALTH CARE EDUCATION/TRAINING PROGRAM

## 2024-11-03 RX ADMIN — ATORVASTATIN CALCIUM 10 MG: 10 TABLET, FILM COATED ORAL at 18:23

## 2024-11-03 RX ADMIN — FAMOTIDINE 20 MG: 20 TABLET ORAL at 18:23

## 2024-11-03 RX ADMIN — LEVOTHYROXINE SODIUM 37.5 MCG: 125 TABLET ORAL at 06:22

## 2024-11-03 RX ADMIN — SERTRALINE HYDROCHLORIDE 150 MG: 100 TABLET ORAL at 09:25

## 2024-11-03 RX ADMIN — FAMOTIDINE 20 MG: 20 TABLET ORAL at 09:25

## 2024-11-03 RX ADMIN — CHOLECALCIFEROL TAB 25 MCG (1000 UNIT) 2000 UNITS: 25 TAB at 09:25

## 2024-11-03 RX ADMIN — ARIPIPRAZOLE 5 MG: 5 TABLET ORAL at 09:26

## 2024-11-03 RX ADMIN — CYANOCOBALAMIN TAB 1000 MCG 1000 MCG: 1000 TAB at 09:26

## 2024-11-03 NOTE — PLAN OF CARE
Problem: Ineffective Coping  Goal: Participates in unit activities  Description: Interventions:  - Provide therapeutic environment   - Provide required programming   - Redirect inappropriate behaviors   Outcome: Progressing  Note: Attended a group in last 24 hours     Problem: Depression  Goal: Treatment Goal: Demonstrate behavioral control of depressive symptoms, verbalize feelings of improved mood/affect, and adopt new coping skills prior to discharge  Outcome: Progressing  Goal: Verbalize thoughts and feelings  Description: Interventions:  - Assess and re-assess patient's level of risk   - Engage patient in 1:1 interactions, daily, for a minimum of 15 minutes   - Encourage patient to express feelings, fears, frustrations, hopes   Outcome: Progressing  Goal: Refrain from harming self  Description: Interventions:  - Monitor patient closely, per order   - Supervise medication ingestion, monitor effects and side effects   Outcome: Progressing  Goal: Refrain from isolation  Description: Interventions:  - Develop a trusting relationship   - Encourage socialization   Outcome: Progressing     Problem: Anxiety  Goal: Anxiety is at manageable level  Description: Interventions:  - Assess and monitor patient's anxiety level.   - Monitor for signs and symptoms (heart palpitations, chest pain, shortness of breath, headaches, nausea, feeling jumpy, restlessness, irritable, apprehensive).   - Collaborate with interdisciplinary team and initiate plan and interventions as ordered.  - [place] patient to unit/surroundings  - Explain treatment plan  - Encourage participation in care  - Encourage verbalization of concerns/fears  - Identify coping mechanisms  - Assist in developing anxiety-reducing skills  - Administer/offer alternative therapies  - Limit or eliminate stimulants  Outcome: Progressing     Problem: DISCHARGE PLANNING - CARE MANAGEMENT  Goal: Discharge to post-acute care or home with appropriate resources  Description:  INTERVENTIONS:  - Conduct assessment to determine patient/family and health care team treatment goals, and need for post-acute services based on payer coverage, community resources, and patient preferences, and barriers to discharge  - Address psychosocial, clinical, and financial barriers to discharge as identified in assessment in conjunction with the patient/family and health care team  - Arrange appropriate level of post-acute services according to patient’s   needs and preference and payer coverage in collaboration with the physician and health care team  - Communicate with and update the patient/family, physician, and health care team regarding progress on the discharge plan  - Arrange appropriate transportation to post-acute venues  Outcome: Progressing     Problem: Knowledge Deficit  Goal: Patient/family/caregiver demonstrates understanding of disease process, treatment plan, medications, and discharge instructions  Description: Complete learning assessment and assess knowledge base.  Interventions:  - Provide teaching at level of understanding  - Provide teaching via preferred learning methods  Outcome: Progressing     Problem: SLEEP DISTURBANCE  Goal: Will exhibit normal sleeping pattern  Description: Interventions:  -  Assess the patients sleep pattern, noting recent changes  - Administer medication as ordered  - Decrease environmental stimuli, including noise, as appropriate during the night  - Encourage the patient to actively participate in unit groups and or exercise during the day to enhance ability to achieve adequate sleep at night  - Assess the patient, in the morning, encouraging a description of sleep experience  Outcome: Progressing

## 2024-11-03 NOTE — NURSING NOTE
Pt pleasant and social on unit in dayroom with peers entire evening. Pleasant on approach and denies SI/HI/AVH. Denies unmet needs or concerns at this time. ADLs poor. Remains hopeful about ongoing housing situation. Compliant with unit routines. Safety checks ongoing.

## 2024-11-03 NOTE — PROGRESS NOTES
"Progress Note - Behavioral Health   Name: Franco Roberson 39 y.o. male I MRN: [id number]  Unit/Bed#: -02 I Date of Admission: 5/14/2024   Date of Service: 11/3/2024 I Hospital Day: 173        Assessment & Plan  MDD (major depressive disorder), recurrent severe, without psychosis (HCC)  No medications changes at this time, will continue to monitor and optimize as indicated:  Abilify 5 mg daily as mood adjunct   Zoloft 150 mg daily for depression and anxiety  Continue to encourage participation in group therapy, milieu therapy and occupational therapy.  Continue to assess for side effects of medications.  Continue collaboration with [facility] for medical co-morbidities as indicated.  Continue discussion with CM/SW to assist with obtaining collateral, disposition planning, and the implementation of patient-centered individualized plan of care.  Continue frequent safety checks and vitals per unit protocol.    Risks, benefits and possible side effects of Medications: Risks, benefits, and possible side effects of medications have previously been explained. No new medications at this time.      Legal status: 201    Disposition: to be determined, pending SOAR application for potential group home placement. OT Cognitive Evaluation completed: \"Pt would benefit from discharge to a supportive environment that can provide checks for safety and compliance with IADLs. \"      No associated orders from this encounter found during lookback period of 72 hours.  Autism spectrum disorder  Continue supportive care    No associated orders from this encounter found during lookback period of 72 hours.         Additional Recommendations:  Behavioral Health checks for safety monitoring.  Continue treatment with group therapy, milieu therapy and occupational therapy.  Medical management per [facility].  Discharge and disposition planning.    ----------------------------------------      Subjective: Per nursing report patient noted to be pleasant and " "cooperative.  Social in the evening time with his peers.  Compliant with medications and meals.    Reports that his mood today is \"good\".  He intends to \"take it easier\" today due to it being a weekend.  He reports that he has been sleeping very well at night, not having any problems initiating or maintaining sleep.  Reporting that his appetite levels are stable and energy levels are good.  He feels that the medications are helpful and denies any side effects at this time.  No SI, HI, AVH reported.    Behavior over the last 24 hours:  unchanged  Sleep: normal  Appetite: normal  Medication side effects: No  ROS: no complaints and all other systems are negative    Mental Status Evaluation:  Appearance:  disheveled, marginal hygiene   Behavior:  pleasant, cooperative, poor eye contact   Speech:  monotone   Mood:  dysphoric   Affect:  blunted   Thought Process:  coherent   Associations: concrete associations   Thought Content:  no overt delusions   Perceptual Disturbances: no auditory hallucinations, no visual hallucinations, does not appear responding to internal stimuli   Risk Potential: Suicidal ideation - None at present  Homicidal ideation - None at present  Potential for aggression - No   Sensorium:  oriented to person, place, and time/date   Memory:  recent and remote memory grossly intact   Consciousness:  alert and awake   Attention/Concentration: attention span and concentration appear shorter than expected for age   Insight:  limited   Judgment: limited   Gait/Station: normal gait/station   Motor Activity: no abnormal movements     Medications: all current active meds have been reviewed and continue current psychiatric medications.  Current Facility-Administered Medications   Medication Dose Route Frequency Provider Last Rate    acetaminophen  650 mg Oral Q6H PRN Basilio Brown MD      acetaminophen  650 mg Oral Q4H PRN Basilio Brown MD      acetaminophen  975 mg Oral Q6H PRN Basilio Brown MD "      aluminum-magnesium hydroxide-simethicone  30 mL Oral Q4H PRN Basilio Brown MD      ARIPiprazole  5 mg Oral Daily Basilio Panda MD      Artificial Tears  1 drop Both Eyes Q3H PRN Basilio Brown MD      atorvastatin  10 mg Oral Daily With Dinner WALLY Baptiste      benztropine  1 mg Intramuscular Q4H PRN Max 6/day Basilio Brown MD      benztropine  1 mg Oral Q4H PRN Max 6/day Basilio Brown MD      Cholecalciferol  2,000 Units Oral Daily Laura WALLY Olmos      cyanocobalamin  1,000 mcg Oral Daily LauraWALLY Tony      Diclofenac Sodium  2 g Topical 4x Daily PRN WALLY Baptiste      hydrOXYzine HCL  50 mg Oral Q6H PRN Max 4/day Basilio Brown MD      Or    diphenhydrAMINE  50 mg Intramuscular Q6H PRN Basilio Brown MD      diphenhydrAMINE-zinc acetate   Topical Daily PRN Raj Guerrero MD      famotidine  20 mg Oral BID WALLY Baptiste      hydrOXYzine HCL  100 mg Oral Q6H PRN Max 4/day Basilio Brown MD      Or    LORazepam  2 mg Intramuscular Q6H PRN Basilio Brown MD      hydrOXYzine HCL  25 mg Oral Q6H PRN Max 4/day Basilio Brown MD      levothyroxine  37.5 mcg Oral Early Morning WALLY Baptiste      melatonin  6 mg Oral HS PRN WALLY Gomez      methocarbamol  500 mg Oral Q6H PRN WALLY Baptiste      OLANZapine  5 mg Oral Q4H PRN Max 3/day Basilio Brown MD      Or    OLANZapine  2.5 mg Intramuscular Q4H PRN Max 3/day Basilio Brown MD      OLANZapine  5 mg Oral Q3H PRN Max 3/day Basilio Brown MD      Or    OLANZapine  5 mg Intramuscular Q3H PRN Max 3/day Basilio Brown MD      OLANZapine  2.5 mg Oral Q4H PRN Max 6/day Basilio Brown MD      polyethylene glycol  17 g Oral Daily PRN Basilio Brown MD      propranolol  10 mg Oral Q8H PRN Basilio Brown MD      senna-docusate sodium  1 tablet Oral Daily PRN Basilio Garcia  MD Karyn      sertraline  150 mg Oral Daily Rachana Salima WALLY Higgins      traZODone  50 mg Oral HS PRN Basilio Brown MD         Labs: I have personally reviewed all pertinent laboratory/tests results  Most Recent Labs:   Lab Results   Component Value Date    WBC 7.01 06/27/2024    RBC 4.54 06/27/2024    HGB 14.5 06/27/2024    HCT 44.4 06/27/2024     06/27/2024    RDW 12.0 06/27/2024    NEUTROABS 4.01 06/27/2024    SODIUM 139 07/10/2024    K 3.8 07/10/2024     07/10/2024    CO2 27 07/10/2024    BUN 16 07/10/2024    CREATININE 0.92 07/10/2024    GLUC 87 07/10/2024    CALCIUM 9.3 07/10/2024    AST 28 07/10/2024    ALT 36 07/10/2024    ALKPHOS 66 07/10/2024    TP 7.7 07/10/2024    ALB 4.4 07/10/2024    TBILI 0.55 07/10/2024    CHOLESTEROL 168 09/19/2024    HDL 42 09/19/2024    TRIG 137 09/19/2024    LDLCALC 99 09/19/2024    NONHDLC 126 09/19/2024    [id number] 6.241 (H) 09/10/2024    FREET4 0.56 (L) 09/10/2024    SYPHILISAB Non-reactive 05/15/2024       Progress Toward Goals: progressing    Risks / Benefits of Treatment:    Risks, benefits, and possible side effects of medications explained to patient and patient verbalizes understanding and agreement for treatment.    Counseling / Coordination of Care:    Total floor / unit time spent today 25 minutes. Greater than 50% of total time was spent with the patient and / or family counseling and / or coordination of care. A description of counseling / coordination of care:  Patient's progress discussed with staff in treatment team meeting.  Medications, treatment progress and treatment plan reviewed with patient.  Reassurance and supportive therapy provided.  Encouraged participation in milieu and group therapy on the unit.    Antonio Miramontes MD 11/03/24

## 2024-11-03 NOTE — NURSING NOTE
Patient denies AVH/SI/HI. He is flat in affect, in bed. He denies depression or anxiety. He is medication compliant and offers no complaints. Patient is visible for meals.

## 2024-11-04 PROCEDURE — 99232 SBSQ HOSP IP/OBS MODERATE 35: CPT | Performed by: PSYCHIATRY & NEUROLOGY

## 2024-11-04 RX ADMIN — FAMOTIDINE 20 MG: 20 TABLET ORAL at 08:29

## 2024-11-04 RX ADMIN — CYANOCOBALAMIN TAB 1000 MCG 1000 MCG: 1000 TAB at 08:29

## 2024-11-04 RX ADMIN — CHOLECALCIFEROL TAB 25 MCG (1000 UNIT) 2000 UNITS: 25 TAB at 08:28

## 2024-11-04 RX ADMIN — ARIPIPRAZOLE 5 MG: 5 TABLET ORAL at 08:28

## 2024-11-04 RX ADMIN — FAMOTIDINE 20 MG: 20 TABLET ORAL at 17:35

## 2024-11-04 RX ADMIN — ATORVASTATIN CALCIUM 10 MG: 10 TABLET, FILM COATED ORAL at 17:34

## 2024-11-04 RX ADMIN — SERTRALINE HYDROCHLORIDE 150 MG: 100 TABLET ORAL at 08:28

## 2024-11-04 RX ADMIN — LEVOTHYROXINE SODIUM 37.5 MCG: 125 TABLET ORAL at 06:29

## 2024-11-04 NOTE — NURSING NOTE
Pt isolative to room entire late evening, only out for needs. Denies symptoms, needs and concerns on assessment. Remains disheveled and appears depressed. Safety checks ongoing.

## 2024-11-04 NOTE — PLAN OF CARE
Problem: Depression  Goal: Treatment Goal: Demonstrate behavioral control of depressive symptoms, verbalize feelings of improved mood/affect, and adopt new coping skills prior to discharge  Outcome: Progressing  Goal: Verbalize thoughts and feelings  Description: Interventions:  - Assess and re-assess patient's level of risk   - Engage patient in 1:1 interactions, daily, for a minimum of 15 minutes   - Encourage patient to express feelings, fears, frustrations, hopes   Outcome: Progressing  Goal: Refrain from harming self  Description: Interventions:  - Monitor patient closely, per order   - Supervise medication ingestion, monitor effects and side effects   Outcome: Progressing  Goal: Refrain from isolation  Description: Interventions:  - Develop a trusting relationship   - Encourage socialization   Outcome: Progressing  Note: Has been more isolative today but in previous 48hrs was less isolative.     Problem: Anxiety  Goal: Anxiety is at manageable level  Description: Interventions:  - Assess and monitor patient's anxiety level.   - Monitor for signs and symptoms (heart palpitations, chest pain, shortness of breath, headaches, nausea, feeling jumpy, restlessness, irritable, apprehensive).   - Collaborate with interdisciplinary team and initiate plan and interventions as ordered.  - Climax patient to unit/surroundings  - Explain treatment plan  - Encourage participation in care  - Encourage verbalization of concerns/fears  - Identify coping mechanisms  - Assist in developing anxiety-reducing skills  - Administer/offer alternative therapies  - Limit or eliminate stimulants  Outcome: Progressing     Problem: DISCHARGE PLANNING - CARE MANAGEMENT  Goal: Discharge to post-acute care or home with appropriate resources  Description: INTERVENTIONS:  - Conduct assessment to determine patient/family and health care team treatment goals, and need for post-acute services based on payer coverage, community resources, and  patient preferences, and barriers to discharge  - Address psychosocial, clinical, and financial barriers to discharge as identified in assessment in conjunction with the patient/family and health care team  - Arrange appropriate level of post-acute services according to patient’s   needs and preference and payer coverage in collaboration with the physician and health care team  - Communicate with and update the patient/family, physician, and health care team regarding progress on the discharge plan  - Arrange appropriate transportation to post-acute venues  Outcome: Progressing     Problem: Knowledge Deficit  Goal: Patient/family/caregiver demonstrates understanding of disease process, treatment plan, medications, and discharge instructions  Description: Complete learning assessment and assess knowledge base.  Interventions:  - Provide teaching at level of understanding  - Provide teaching via preferred learning methods  Outcome: Progressing     Problem: SLEEP DISTURBANCE  Goal: Will exhibit normal sleeping pattern  Description: Interventions:  -  Assess the patients sleep pattern, noting recent changes  - Administer medication as ordered  - Decrease environmental stimuli, including noise, as appropriate during the night  - Encourage the patient to actively participate in unit groups and or exercise during the day to enhance ability to achieve adequate sleep at night  - Assess the patient, in the morning, encouraging a description of sleep experience  Outcome: Progressing     Problem: Ineffective Coping  Goal: Participates in unit activities  Description: Interventions:  - Provide therapeutic environment   - Provide required programming   - Redirect inappropriate behaviors   Note: Has attended 1 group in last 24 hours.

## 2024-11-04 NOTE — PROGRESS NOTES
"Progress Note - Behavioral Health   Name: Franco Roberson 39 y.o. male I MRN: [id number]  Unit/Bed#: -02 I Date of Admission: 5/14/2024   Date of Service: 11/4/2024 I Hospital Day: 174     Assessment & Plan  MDD (major depressive disorder), recurrent severe, without psychosis (HCC)  No medications changes at this time, will continue to monitor and optimize as indicated:  Abilify 5 mg daily as mood adjunct   Zoloft 150 mg daily for depression and anxiety  Continue to encourage participation in group therapy, milieu therapy and occupational therapy.  Continue to assess for side effects of medications.  Continue collaboration with [facility] for medical co-morbidities as indicated.  Continue discussion with CM/SW to assist with obtaining collateral, disposition planning, and the implementation of patient-centered individualized plan of care.  Continue frequent safety checks and vitals per unit protocol.    Risks, benefits and possible side effects of Medications: Risks, benefits, and possible side effects of medications have previously been explained. No new medications at this time.      Legal status: 201    Disposition: to be determined, pending SOAR application for potential group home placement. OT Cognitive Evaluation completed: \"Pt would benefit from discharge to a supportive environment that can provide checks for safety and compliance with IADLs. \"      No associated orders from this encounter found during lookback period of 72 hours.  Autism spectrum disorder  Continue supportive care    No associated orders from this encounter found during lookback period of 72 hours.      Current medications:  Current Facility-Administered Medications   Medication Dose Route Frequency Provider Last Rate    acetaminophen  650 mg Oral Q6H PRN Basilio Brown MD      acetaminophen  650 mg Oral Q4H PRN Basilio Brown MD      acetaminophen  975 mg Oral Q6H PRN Basilio Brown MD      aluminum-magnesium " hydroxide-simethicone  30 mL Oral Q4H PRN Basilio Brown MD      ARIPiprazole  5 mg Oral Daily Basilio Panda MD      Artificial Tears  1 drop Both Eyes Q3H PRN Basilio Brown MD      atorvastatin  10 mg Oral Daily With Dinner WALLY Baptiste      benztropine  1 mg Intramuscular Q4H PRN Max 6/day Basilio Brown MD      benztropine  1 mg Oral Q4H PRN Max 6/day Basilio Brown MD      Cholecalciferol  2,000 Units Oral Daily WALLY Baptiste      cyanocobalamin  1,000 mcg Oral Daily WALLY Baptiste      Diclofenac Sodium  2 g Topical 4x Daily PRN WALLY Baptiste      hydrOXYzine HCL  50 mg Oral Q6H PRN Max 4/day Basilio Brown MD      Or    diphenhydrAMINE  50 mg Intramuscular Q6H PRN Basilio Brown MD      diphenhydrAMINE-zinc acetate   Topical Daily PRN Raj Guerrero MD      famotidine  20 mg Oral BID WALLY Baptiste      hydrOXYzine HCL  100 mg Oral Q6H PRN Max 4/day Basilio Brown MD      Or    LORazepam  2 mg Intramuscular Q6H PRN Basilio Brown MD      hydrOXYzine HCL  25 mg Oral Q6H PRN Max 4/day Basilio Brown MD      levothyroxine  37.5 mcg Oral Early Morning WALLY Baptiste      melatonin  6 mg Oral HS PRN WALLY Gomez      methocarbamol  500 mg Oral Q6H PRN WALLY Baptiste      OLANZapine  5 mg Oral Q4H PRN Max 3/day Basilio Brown MD      Or    OLANZapine  2.5 mg Intramuscular Q4H PRN Max 3/day Basilio Brown MD      OLANZapine  5 mg Oral Q3H PRN Max 3/day Basilio Brown MD      Or    OLANZapine  5 mg Intramuscular Q3H PRN Max 3/day Basilio Brown MD      OLANZapine  2.5 mg Oral Q4H PRN Max 6/day Basilio Brown MD      polyethylene glycol  17 g Oral Daily PRN Basilio Brown MD      propranolol  10 mg Oral Q8H PRN Basilio Brown MD      senna-docusate sodium  1 tablet Oral Daily PRN Basilio Brown MD      sertraline   150 mg Oral Daily WALLY Gomez      traZODone  50 mg Oral HS PRN Basilio Brown MD         Risks / Benefits of Treatment:    Risks, benefits, and possible side effects of medications explained to patient and patient verbalizes understanding and agreement for treatment.    Subjective:    Behavior over the last 24 hours: unchanged.     The patient was evaluated this morning for continuity of care and no acute distress noted throughout the evaluation.  Over the past 24 hours staff noted the patient was reporting feeling hopeful and denying all other psych symptoms.      Patient was visited on unit for continuing care; chart reviewed and discussed with multidisciplinary treatment team.  On approach, the patient was calm and cooperative. Franco reported feeling frustrated by his in person visit with [facility], states they were not helpful and wanted him to change his residency to [place], although pt is from [place].  Denied any changes in mood, appetite, and energy level. No problem initiating and maintaining sleep.  Denied A/VH currently.  Denied SI/HI, intent or plan upon direct inquiry at this time.    Patient continues to be visible in the milieu and interacts with staff and peers. No reports of aggression or self-injurious behavior on unit. No PRN medications used in the past 24 hours.    Patient accepted all offered medications and no adverse effects of medications noted or reported.    Sleep: normal  Appetite: normal  Medication side effects: No   ROS: no complaints    Mental Status Examination:  Appearance:  age appropriate, casually dressed, looks stated age   Behavior:  pleasant, cooperative, calm   Speech:  normal rate and volume   Mood:  euthymic   Affect:  constricted   Thought Process:  organized, logical, coherent, goal directed, linear   Associations: concrete associations   Thought Content:  no overt delusions   Perceptual Disturbances: no auditory hallucinations, no visual hallucinations, does not  appear responding to internal stimuli   Risk Potential: Suicidal ideation - None at present  Homicidal ideation - None at present  Potential for aggression - No   Sensorium:  oriented to person, place, and time/date   Memory:  recent and remote memory grossly intact   Consciousness:  alert and awake   Attention/Concentration: attention span and concentration are age appropriate   Insight:  limited   Judgment: limited   Gait/Station: normal gait/station   Motor Activity: no abnormal movements       Vital signs in last 24 hours:    Temp:  [97.2 °F (36.2 °C)-97.6 °F (36.4 °C)] 97.6 °F (36.4 °C)  HR:  [70-71] 71  BP: (111-117)/(72-74) 111/74  Resp:  [16] 16  SpO2:  [96 %-99 %] 99 %  O2 Device: None (Room air)         Laboratory results: I have personally reviewed all pertinent laboratory/tests results    Most Recent Labs:   Lab Results   Component Value Date    WBC 7.01 06/27/2024    RBC 4.54 06/27/2024    HGB 14.5 06/27/2024    HCT 44.4 06/27/2024     06/27/2024    RDW 12.0 06/27/2024    NEUTROABS 4.01 06/27/2024    SODIUM 139 07/10/2024    K 3.8 07/10/2024     07/10/2024    CO2 27 07/10/2024    BUN 16 07/10/2024    CREATININE 0.92 07/10/2024    GLUC 87 07/10/2024    CALCIUM 9.3 07/10/2024    AST 28 07/10/2024    ALT 36 07/10/2024    ALKPHOS 66 07/10/2024    TP 7.7 07/10/2024    ALB 4.4 07/10/2024    TBILI 0.55 07/10/2024    CHOLESTEROL 168 09/19/2024    HDL 42 09/19/2024    TRIG 137 09/19/2024    LDLCALC 99 09/19/2024    NONHDLC 126 09/19/2024    [id number] 6.241 (H) 09/10/2024    FREET4 0.56 (L) 09/10/2024    SYPHILISAB Non-reactive 05/15/2024       Progress Toward Goals: Franco is currently assessed as being at their baseline with continued need for medication management, supervision for safety and ADL’s. These services are not currently available in a less restrictive environment necessitating continued hospital stay.  Franco should remain on the unit until these services are available, due to  likelihood of mental decompensation and readmission if discharged to an unsupervised setting.  Assertive discharge planning and collaboration with multiple providers (inpatient and community based) remains ongoing.  Franco is currently awaiting for private pay group home versus [facility]    Counseling / Coordination of Care:    Patient's progress discussed with staff in treatment team meeting.  Medication changes reviewed with staff in treatment team meeting.  Patient's progress reviewed with nursing staff.  Medications, treatment progress and treatment plan reviewed with patient.  Medication changes discussed with patient.  Medication education provided to patient.  Patient's progress reviewed with case management staff.  Reassurance and supportive therapy provided.}    Lita Knapp, DO 11/04/24    This note was completed in part utilizing Dragon dictation Software. Grammatical, translation, syntax errors, random word insertions, spelling mistakes, and incomplete sentences may be an occasional consequence of this system secondary to software limitations with voice recognition, ambient noise, and hardware issues. If you have any questions or concerns about the content, text, or information contained within the body of this dictation, please contact the provider for clarification.

## 2024-11-04 NOTE — PROGRESS NOTES
11/04/24 0843   Team Meeting   Meeting Type Daily Rounds   Team Members Present   Team Members Present Physician;Nurse;   Physician Team Member Aria   [facility]   Care Management Team Member Noa   Patient/Family Present   Patient Present No   Patient's Family Present No     Pt is medication and meal compliant. Pt is calm and cooperative. Pt had his day pass on Friday and went to the social security office. Pt's discharge is pending placement.

## 2024-11-04 NOTE — NURSING NOTE
Pt denies SI/HI/AH/VH. Present in milieu but is mostly isolative to room. Medication and meal compliant. Pt appears depressed but denies having any depression at this time. Pt is mostly isolative to self. Compliant with Lunch. No further concerns as of present. Plan of care ongoing.

## 2024-11-05 PROCEDURE — 99232 SBSQ HOSP IP/OBS MODERATE 35: CPT | Performed by: PSYCHIATRY & NEUROLOGY

## 2024-11-05 RX ADMIN — SERTRALINE HYDROCHLORIDE 150 MG: 100 TABLET ORAL at 08:17

## 2024-11-05 RX ADMIN — FAMOTIDINE 20 MG: 20 TABLET ORAL at 17:21

## 2024-11-05 RX ADMIN — ATORVASTATIN CALCIUM 10 MG: 10 TABLET, FILM COATED ORAL at 17:21

## 2024-11-05 RX ADMIN — FAMOTIDINE 20 MG: 20 TABLET ORAL at 08:17

## 2024-11-05 RX ADMIN — CYANOCOBALAMIN TAB 1000 MCG 1000 MCG: 1000 TAB at 08:17

## 2024-11-05 RX ADMIN — LEVOTHYROXINE SODIUM 37.5 MCG: 125 TABLET ORAL at 06:07

## 2024-11-05 RX ADMIN — ARIPIPRAZOLE 5 MG: 5 TABLET ORAL at 08:17

## 2024-11-05 RX ADMIN — CHOLECALCIFEROL TAB 25 MCG (1000 UNIT) 2000 UNITS: 25 TAB at 08:17

## 2024-11-05 NOTE — NURSING NOTE
Pt denies SI/HI/AH/VH. Present in milieu but is mostly isolative to room and self this morning. Medication and meal compliant. Scant/guarded with communication. Pt is disheveled and has poor hygiene. Pt appears depressed/withdrawn but denies feeling depressed at this time. Compliant with Lunch. No further concerns as of present. Plan of care ongoing.

## 2024-11-05 NOTE — NURSING NOTE
Pt mainly isolative to room this evening. Denies SI/HI/AVH on assessment. Denies depression and concerns or unmet needs. Appears depressed standing in line for snack with downcast gaze, minimal interaction with writer. Compliant with unit routines and care. Safety checks ongoing.

## 2024-11-05 NOTE — PLAN OF CARE
Problem: Ineffective Coping  Goal: Participates in unit activities  Description: Interventions:  - Provide therapeutic environment   - Provide required programming   - Redirect inappropriate behaviors   Outcome: Progressing     Problem: Depression  Goal: Treatment Goal: Demonstrate behavioral control of depressive symptoms, verbalize feelings of improved mood/affect, and adopt new coping skills prior to discharge  Outcome: Progressing  Goal: Verbalize thoughts and feelings  Description: Interventions:  - Assess and re-assess patient's level of risk   - Engage patient in 1:1 interactions, daily, for a minimum of 15 minutes   - Encourage patient to express feelings, fears, frustrations, hopes   Outcome: Progressing  Goal: Refrain from harming self  Description: Interventions:  - Monitor patient closely, per order   - Supervise medication ingestion, monitor effects and side effects   Outcome: Progressing  Goal: Refrain from isolation  Description: Interventions:  - Develop a trusting relationship   - Encourage socialization   Outcome: Progressing     Problem: Anxiety  Goal: Anxiety is at manageable level  Description: Interventions:  - Assess and monitor patient's anxiety level.   - Monitor for signs and symptoms (heart palpitations, chest pain, shortness of breath, headaches, nausea, feeling jumpy, restlessness, irritable, apprehensive).   - Collaborate with interdisciplinary team and initiate plan and interventions as ordered.  - [place] patient to unit/surroundings  - Explain treatment plan  - Encourage participation in care  - Encourage verbalization of concerns/fears  - Identify coping mechanisms  - Assist in developing anxiety-reducing skills  - Administer/offer alternative therapies  - Limit or eliminate stimulants  Outcome: Progressing     Problem: DISCHARGE PLANNING - CARE MANAGEMENT  Goal: Discharge to post-acute care or home with appropriate resources  Description: INTERVENTIONS:  - Conduct assessment to  determine patient/family and health care team treatment goals, and need for post-acute services based on payer coverage, community resources, and patient preferences, and barriers to discharge  - Address psychosocial, clinical, and financial barriers to discharge as identified in assessment in conjunction with the patient/family and health care team  - Arrange appropriate level of post-acute services according to patient’s   needs and preference and payer coverage in collaboration with the physician and health care team  - Communicate with and update the patient/family, physician, and health care team regarding progress on the discharge plan  - Arrange appropriate transportation to post-acute venues  Outcome: Progressing     Problem: Knowledge Deficit  Goal: Patient/family/caregiver demonstrates understanding of disease process, treatment plan, medications, and discharge instructions  Description: Complete learning assessment and assess knowledge base.  Interventions:  - Provide teaching at level of understanding  - Provide teaching via preferred learning methods  Outcome: Progressing     Problem: SLEEP DISTURBANCE  Goal: Will exhibit normal sleeping pattern  Description: Interventions:  -  Assess the patients sleep pattern, noting recent changes  - Administer medication as ordered  - Decrease environmental stimuli, including noise, as appropriate during the night  - Encourage the patient to actively participate in unit groups and or exercise during the day to enhance ability to achieve adequate sleep at night  - Assess the patient, in the morning, encouraging a description of sleep experience  Outcome: Progressing

## 2024-11-05 NOTE — PROGRESS NOTES
"Progress Note - Behavioral Health   Name: Franco Roberson 39 y.o. male I MRN: [id number]  Unit/Bed#: -02 I Date of Admission: 5/14/2024   Date of Service: 11/5/2024 I Hospital Day: 175     Assessment & Plan  MDD (major depressive disorder), recurrent severe, without psychosis (HCC)  No medications changes at this time, will continue to monitor and optimize as indicated:  Abilify 5 mg daily as mood adjunct   Zoloft 150 mg daily for depression and anxiety  Continue to encourage participation in group therapy, milieu therapy and occupational therapy.  Continue to assess for side effects of medications.  Continue collaboration with SLIM for medical co-morbidities as indicated.  Continue discussion with CM/SW to assist with obtaining collateral, disposition planning, and the implementation of patient-centered individualized plan of care.  Continue frequent safety checks and vitals per unit protocol.    Risks, benefits and possible side effects of Medications: Risks, benefits, and possible side effects of medications have previously been explained. No new medications at this time.      Legal status: 201    Disposition: to be determined, pending SOAR application for potential group home placement. OT Cognitive Evaluation completed: \"Pt would benefit from discharge to a supportive environment that can provide checks for safety and compliance with IADLs. \"      No associated orders from this encounter found during lookback period of 72 hours.  Autism spectrum disorder  Continue supportive care    No associated orders from this encounter found during lookback period of 72 hours.      Current medications:  Current Facility-Administered Medications   Medication Dose Route Frequency Provider Last Rate    acetaminophen  650 mg Oral Q6H PRN Basilio Brown MD      acetaminophen  650 mg Oral Q4H PRN Basilio Brown MD      acetaminophen  975 mg Oral Q6H PRN Basilio Brown MD      aluminum-magnesium " hydroxide-simethicone  30 mL Oral Q4H PRN Basilio Brown MD      ARIPiprazole  5 mg Oral Daily Basilio Panda MD      Artificial Tears  1 drop Both Eyes Q3H PRN Basilio Brown MD      atorvastatin  10 mg Oral Daily With Dinner WALLY Baptiste      benztropine  1 mg Intramuscular Q4H PRN Max 6/day Basilio Brown MD      benztropine  1 mg Oral Q4H PRN Max 6/day Basilio Brown MD      Cholecalciferol  2,000 Units Oral Daily WALLY Baptiste      cyanocobalamin  1,000 mcg Oral Daily WALLY Baptiste      Diclofenac Sodium  2 g Topical 4x Daily PRN WALLY Baptiste      hydrOXYzine HCL  50 mg Oral Q6H PRN Max 4/day Basilio Brown MD      Or    diphenhydrAMINE  50 mg Intramuscular Q6H PRN Basilio Brown MD      diphenhydrAMINE-zinc acetate   Topical Daily PRN Raj Guerrero MD      famotidine  20 mg Oral BID WALLY Baptiste      hydrOXYzine HCL  100 mg Oral Q6H PRN Max 4/day Basilio Brown MD      Or    LORazepam  2 mg Intramuscular Q6H PRN Basilio Brown MD      hydrOXYzine HCL  25 mg Oral Q6H PRN Max 4/day Basilio Brown MD      levothyroxine  37.5 mcg Oral Early Morning WALLY Baptiste      melatonin  6 mg Oral HS PRN WALLY Gomez      methocarbamol  500 mg Oral Q6H PRN WALLY Baptiste      OLANZapine  5 mg Oral Q4H PRN Max 3/day Basilio Brown MD      Or    OLANZapine  2.5 mg Intramuscular Q4H PRN Max 3/day Basilio Brown MD      OLANZapine  5 mg Oral Q3H PRN Max 3/day Basilio Brown MD      Or    OLANZapine  5 mg Intramuscular Q3H PRN Max 3/day Basilio Brown MD      OLANZapine  2.5 mg Oral Q4H PRN Max 6/day Basilio Brown MD      polyethylene glycol  17 g Oral Daily PRN Basilio Brown MD      propranolol  10 mg Oral Q8H PRN Basilio Brown MD      senna-docusate sodium  1 tablet Oral Daily PRN Basilio Brown MD      sertraline   150 mg Oral Daily WALLY Gomez      traZODone  50 mg Oral HS PRN Basilio Brown MD         Risks / Benefits of Treatment:    Risks, benefits, and possible side effects of medications explained to patient and patient verbalizes understanding and agreement for treatment.    Subjective:    Behavior over the last 24 hours: unchanged.     The patient was evaluated this morning for continuity of care and no acute distress noted throughout the evaluation.  Over the past 24 hours staff noted the patient was calm and cooperative, he remains compliant with medications or meals.      Patient was visited on unit for continuing care; chart reviewed and discussed with multidisciplinary treatment team.  On approach, the patient was calm and cooperative.  Franco reports today he is feeling better than yesterday, he describes his mood as okay.  He states that caseworkers told him they are waiting for his renewed license to arrive so that he can complete Social Security paperwork.  Denied any changes in mood, appetite, and energy level. No problem initiating and maintaining sleep.  Denied A/VH currently.  Denied SI/HI, intent or plan upon direct inquiry at this time.    Patient continues to be visible in the milieu and interacts with staff and peers. No reports of aggression or self-injurious behavior on unit. No PRN medications used in the past 24 hours.    Patient accepted all offered medications and no adverse effects of medications noted or reported.    Sleep: normal  Appetite: normal  Medication side effects: No   ROS: no complaints    Mental Status Examination:  Appearance:  age appropriate, casually dressed, looks stated age   Behavior:  pleasant, cooperative, calm   Speech:  normal rate and volume   Mood:  euthymic   Affect:  constricted   Thought Process:  organized, logical, linear   Associations: concrete associations   Thought Content:  no overt delusions   Perceptual Disturbances: no auditory  hallucinations, no visual hallucinations, does not appear responding to internal stimuli   Risk Potential: Suicidal ideation - None at present  Homicidal ideation - None at present  Potential for aggression - No   Sensorium:  oriented to person, place, and time/date   Memory:  recent and remote memory grossly intact   Consciousness:  alert and awake   Attention/Concentration: attention span and concentration are age appropriate   Insight:  limited   Judgment: limited   Gait/Station: normal gait/station   Motor Activity: no abnormal movements       Vital signs in last 24 hours:    Temp:  [96.9 °F (36.1 °C)-97.6 °F (36.4 °C)] 96.9 °F (36.1 °C)  HR:  [67-79] 67  BP: (125-135)/(69-70) 125/69  SpO2:  [97 %-99 %] 99 %  O2 Device: None (Room air)         Laboratory results: I have personally reviewed all pertinent laboratory/tests results    Most Recent Labs:   Lab Results   Component Value Date    WBC 7.01 06/27/2024    RBC 4.54 06/27/2024    HGB 14.5 06/27/2024    HCT 44.4 06/27/2024     06/27/2024    RDW 12.0 06/27/2024    NEUTROABS 4.01 06/27/2024    SODIUM 139 07/10/2024    K 3.8 07/10/2024     07/10/2024    CO2 27 07/10/2024    BUN 16 07/10/2024    CREATININE 0.92 07/10/2024    GLUC 87 07/10/2024    CALCIUM 9.3 07/10/2024    AST 28 07/10/2024    ALT 36 07/10/2024    ALKPHOS 66 07/10/2024    TP 7.7 07/10/2024    ALB 4.4 07/10/2024    TBILI 0.55 07/10/2024    CHOLESTEROL 168 09/19/2024    HDL 42 09/19/2024    TRIG 137 09/19/2024    LDLCALC 99 09/19/2024    NONHDLC 126 09/19/2024    [id number] 6.241 (H) 09/10/2024    FREET4 0.56 (L) 09/10/2024    SYPHILISAB Non-reactive 05/15/2024       Progress Toward Goals: Franco is currently assessed as being at their baseline with continued need for medication management, supervision for safety and ADL’s. These services are not currently available in a less restrictive environment necessitating continued hospital stay.  Franco should remain on the unit until these  services are available, due to likelihood of mental decompensation and readmission if discharged to an unsupervised setting.  Assertive discharge planning and collaboration with multiple providers (inpatient and community based) remains ongoing.  Franco is currently awaiting for private pay group home vs safe [facility].      Counseling / Coordination of Care:    Patient's progress discussed with staff in treatment team meeting.  Medication changes reviewed with staff in treatment team meeting.  Patient's progress reviewed with nursing staff.  Medications, treatment progress and treatment plan reviewed with patient.  Medication changes discussed with patient.  Medication education provided to patient.  Patient's progress reviewed with case management staff.  Reassurance and supportive therapy provided.}    Lita Aria, DO 11/05/24    This note was completed in part utilizing Dragon dictation Software. Grammatical, translation, syntax errors, random word insertions, spelling mistakes, and incomplete sentences may be an occasional consequence of this system secondary to software limitations with voice recognition, ambient noise, and hardware issues. If you have any questions or concerns about the content, text, or information contained within the body of this dictation, please contact the provider for clarification.

## 2024-11-06 PROCEDURE — 99232 SBSQ HOSP IP/OBS MODERATE 35: CPT | Performed by: PSYCHIATRY & NEUROLOGY

## 2024-11-06 RX ADMIN — CHOLECALCIFEROL TAB 25 MCG (1000 UNIT) 2000 UNITS: 25 TAB at 08:23

## 2024-11-06 RX ADMIN — SERTRALINE HYDROCHLORIDE 150 MG: 100 TABLET ORAL at 08:23

## 2024-11-06 RX ADMIN — ATORVASTATIN CALCIUM 10 MG: 10 TABLET, FILM COATED ORAL at 17:56

## 2024-11-06 RX ADMIN — ARIPIPRAZOLE 5 MG: 5 TABLET ORAL at 08:23

## 2024-11-06 RX ADMIN — FAMOTIDINE 20 MG: 20 TABLET ORAL at 17:56

## 2024-11-06 RX ADMIN — CYANOCOBALAMIN TAB 1000 MCG 1000 MCG: 1000 TAB at 08:23

## 2024-11-06 RX ADMIN — LEVOTHYROXINE SODIUM 37.5 MCG: 125 TABLET ORAL at 06:28

## 2024-11-06 RX ADMIN — FAMOTIDINE 20 MG: 20 TABLET ORAL at 08:23

## 2024-11-06 NOTE — NURSING NOTE
"Franco is apathetic with a blunted affect, poor eye contact, and withdrawn to room reporting mild depression but says \"But its not impairing my functioning, I'm still doing my poetry.\" Denies SI/HI/AVH. No unmet needs currently.   "

## 2024-11-06 NOTE — PROGRESS NOTES
"Progress Note - Behavioral Health   Name: Franco Roberson 39 y.o. male I MRN: [id number]  Unit/Bed#: -02 I Date of Admission: 5/14/2024   Date of Service: 11/6/2024 I Hospital Day: 176     Assessment & Plan  MDD (major depressive disorder), recurrent severe, without psychosis (HCC)  No medications changes at this time, will continue to monitor and optimize as indicated:  Abilify 5 mg daily as mood adjunct   Zoloft 150 mg daily for depression and anxiety  Continue to encourage participation in group therapy, milieu therapy and occupational therapy.  Continue to assess for side effects of medications.  Continue collaboration with SLIM for medical co-morbidities as indicated.  Continue discussion with CM/SW to assist with obtaining collateral, disposition planning, and the implementation of patient-centered individualized plan of care.  Continue frequent safety checks and vitals per unit protocol.    Risks, benefits and possible side effects of Medications: Risks, benefits, and possible side effects of medications have previously been explained. No new medications at this time.      Legal status: 201    Disposition: to be determined, pending SOAR application for potential group home placement. OT Cognitive Evaluation completed: \"Pt would benefit from discharge to a supportive environment that can provide checks for safety and compliance with IADLs. \"      No associated orders from this encounter found during lookback period of 72 hours.  Autism spectrum disorder  Continue supportive care    No associated orders from this encounter found during lookback period of 72 hours.      Current medications:  Current Facility-Administered Medications   Medication Dose Route Frequency Provider Last Rate    acetaminophen  650 mg Oral Q6H PRN Basilio Brown MD      acetaminophen  650 mg Oral Q4H PRN Basilio Brown MD      acetaminophen  975 mg Oral Q6H PRN Basilio Brown MD      aluminum-magnesium " hydroxide-simethicone  30 mL Oral Q4H PRN Basilio Brown MD      ARIPiprazole  5 mg Oral Daily Basilio Panda MD      Artificial Tears  1 drop Both Eyes Q3H PRN Basilio Brown MD      atorvastatin  10 mg Oral Daily With Dinner WALLY Baptiste      benztropine  1 mg Intramuscular Q4H PRN Max 6/day Basilio Brown MD      benztropine  1 mg Oral Q4H PRN Max 6/day Basilio Brown MD      Cholecalciferol  2,000 Units Oral Daily WALLY Baptiste      cyanocobalamin  1,000 mcg Oral Daily WALLY Baptiste      Diclofenac Sodium  2 g Topical 4x Daily PRN WALLY Baptiste      hydrOXYzine HCL  50 mg Oral Q6H PRN Max 4/day Basilio Brown MD      Or    diphenhydrAMINE  50 mg Intramuscular Q6H PRN Basilio Brown MD      diphenhydrAMINE-zinc acetate   Topical Daily PRN Raj Guerrero MD      famotidine  20 mg Oral BID WALLY Baptiste      hydrOXYzine HCL  100 mg Oral Q6H PRN Max 4/day Basilio Brown MD      Or    LORazepam  2 mg Intramuscular Q6H PRN Basilio Brown MD      hydrOXYzine HCL  25 mg Oral Q6H PRN Max 4/day Basilio Brown MD      levothyroxine  37.5 mcg Oral Early Morning WALLY Baptiste      melatonin  6 mg Oral HS PRN WALLY Gomez      methocarbamol  500 mg Oral Q6H PRN WALLY Baptiste      OLANZapine  5 mg Oral Q4H PRN Max 3/day Basilio Brown MD      Or    OLANZapine  2.5 mg Intramuscular Q4H PRN Max 3/day Basilio Brown MD      OLANZapine  5 mg Oral Q3H PRN Max 3/day Basilio Brown MD      Or    OLANZapine  5 mg Intramuscular Q3H PRN Max 3/day Basilio Brown MD      OLANZapine  2.5 mg Oral Q4H PRN Max 6/day Basilio Brown MD      polyethylene glycol  17 g Oral Daily PRN Basilio Brown MD      propranolol  10 mg Oral Q8H PRN Basilio Brown MD      senna-docusate sodium  1 tablet Oral Daily PRN Basilio Brown MD      sertraline   150 mg Oral Daily WALLY Gomez      traZODone  50 mg Oral HS PRN Basilio Brown MD         Risks / Benefits of Treatment:    Risks, benefits, and possible side effects of medications explained to patient and patient verbalizes understanding and agreement for treatment.    Subjective:    Behavior over the last 24 hours: unchanged.     The patient was evaluated this morning for continuity of care and no acute distress noted throughout the evaluation.  Over the past 24 hours staff noted the patient was compliant with medications and meals.      Patient was visited on unit for continuing care; chart reviewed and discussed with multidisciplinary treatment team.  On approach, the patient was calm and cooperative. Franco reports good mood and has no concerns at this time. Denied any changes in mood, appetite, and energy level. No problem initiating and maintaining sleep.  Denied A/VH currently.  Denied SI/HI, intent or plan upon direct inquiry at this time.    Patient continues to be selectively interactive with staff and peers.   No reports of aggression or self-injurious behavior on unit. No PRN medications used in the past 24 hours.    Patient accepted all offered medications and no adverse effects of medications noted or reported.    Sleep: normal  Appetite: normal  Medication side effects: No   ROS: no complaints    Mental Status Examination:  Appearance:  age appropriate, casually dressed, looks stated age   Behavior:  pleasant, cooperative, calm   Speech:  normal rate and volume   Mood:  euthymic   Affect:  constricted   Thought Process:  organized, logical, linear   Associations: concrete associations   Thought Content:  no overt delusions   Perceptual Disturbances: no auditory hallucinations, no visual hallucinations, does not appear responding to internal stimuli   Risk Potential: Suicidal ideation - None at present  Homicidal ideation - None at present  Potential for aggression - No    Sensorium:  oriented to person, place, and time/date   Memory:  recent and remote memory grossly intact   Consciousness:  alert and awake   Attention/Concentration: attention span and concentration are age appropriate   Insight:  limited   Judgment: limited   Gait/Station: normal gait/station   Motor Activity: no abnormal movements       Vital signs in last 24 hours:    Temp:  [96.7 °F (35.9 °C)-97.1 °F (36.2 °C)] 96.7 °F (35.9 °C)  HR:  [66-73] 66  BP: (100-104)/(67-69) 104/69  Resp:  [18] 18  SpO2:  [96 %-98 %] 98 %  O2 Device: None (Room air)         Laboratory results: I have personally reviewed all pertinent laboratory/tests results    Most Recent Labs:   Lab Results   Component Value Date    WBC 7.01 06/27/2024    RBC 4.54 06/27/2024    HGB 14.5 06/27/2024    HCT 44.4 06/27/2024     06/27/2024    RDW 12.0 06/27/2024    NEUTROABS 4.01 06/27/2024    SODIUM 139 07/10/2024    K 3.8 07/10/2024     07/10/2024    CO2 27 07/10/2024    BUN 16 07/10/2024    CREATININE 0.92 07/10/2024    GLUC 87 07/10/2024    CALCIUM 9.3 07/10/2024    AST 28 07/10/2024    ALT 36 07/10/2024    ALKPHOS 66 07/10/2024    TP 7.7 07/10/2024    ALB 4.4 07/10/2024    TBILI 0.55 07/10/2024    CHOLESTEROL 168 09/19/2024    HDL 42 09/19/2024    TRIG 137 09/19/2024    LDLCALC 99 09/19/2024    NONHDLC 126 09/19/2024    [id number] 6.241 (H) 09/10/2024    FREET4 0.56 (L) 09/10/2024    SYPHILISAB Non-reactive 05/15/2024       Progress Toward Goals: Franco is currently assessed as being at their baseline with continued need for medication management, supervision for safety and ADL’s. These services are not currently available in a less restrictive environment necessitating continued hospital stay.  Franco should remain on the unit until these services are available, due to likelihood of mental decompensation and readmission if discharged to an unsupervised setting.  Assertive discharge planning and collaboration with multiple providers  (inpatient and community based) remains ongoing.  Franco is currently awaiting [facility]      Counseling / Coordination of Care:    Patient's progress discussed with staff in treatment team meeting.  Medication changes reviewed with staff in treatment team meeting.  Patient's progress reviewed with nursing staff.  Medications, treatment progress and treatment plan reviewed with patient.  Medication changes discussed with patient.  Medication education provided to patient.  Patient's progress reviewed with case management staff.  Reassurance and supportive therapy provided.}    Lita Knapp,  11/06/24    This note was completed in part utilizing Dragon dictation Software. Grammatical, translation, syntax errors, random word insertions, spelling mistakes, and incomplete sentences may be an occasional consequence of this system secondary to software limitations with voice recognition, ambient noise, and hardware issues. If you have any questions or concerns about the content, text, or information contained within the body of this dictation, please contact the provider for clarification.

## 2024-11-06 NOTE — PLAN OF CARE
Pt attends some groups, typically quiet but does participate appropriately when prompted. Pt appears depressed with a decrease in hygiene.

## 2024-11-06 NOTE — PLAN OF CARE
Problem: Depression  Goal: Treatment Goal: Demonstrate behavioral control of depressive symptoms, verbalize feelings of improved mood/affect, and adopt new coping skills prior to discharge  Outcome: Progressing  Goal: Verbalize thoughts and feelings  Description: Interventions:  - Assess and re-assess patient's level of risk   - Engage patient in 1:1 interactions, daily, for a minimum of 15 minutes   - Encourage patient to express feelings, fears, frustrations, hopes   Outcome: Progressing  Goal: Refrain from harming self  Description: Interventions:  - Monitor patient closely, per order   - Supervise medication ingestion, monitor effects and side effects   Outcome: Progressing  Goal: Refrain from isolation  Description: Interventions:  - Develop a trusting relationship   - Encourage socialization   Outcome: Progressing     Problem: Anxiety  Goal: Anxiety is at manageable level  Description: Interventions:  - Assess and monitor patient's anxiety level.   - Monitor for signs and symptoms (heart palpitations, chest pain, shortness of breath, headaches, nausea, feeling jumpy, restlessness, irritable, apprehensive).   - Collaborate with interdisciplinary team and initiate plan and interventions as ordered.  - [place] patient to unit/surroundings  - Explain treatment plan  - Encourage participation in care  - Encourage verbalization of concerns/fears  - Identify coping mechanisms  - Assist in developing anxiety-reducing skills  - Administer/offer alternative therapies  - Limit or eliminate stimulants  Outcome: Progressing     Problem: DISCHARGE PLANNING - CARE MANAGEMENT  Goal: Discharge to post-acute care or home with appropriate resources  Description: INTERVENTIONS:  - Conduct assessment to determine patient/family and health care team treatment goals, and need for post-acute services based on payer coverage, community resources, and patient preferences, and barriers to discharge  - Address psychosocial, clinical,  and financial barriers to discharge as identified in assessment in conjunction with the patient/family and health care team  - Arrange appropriate level of post-acute services according to patient’s   needs and preference and payer coverage in collaboration with the physician and health care team  - Communicate with and update the patient/family, physician, and health care team regarding progress on the discharge plan  - Arrange appropriate transportation to post-acute venues  Outcome: Progressing     Problem: Knowledge Deficit  Goal: Patient/family/caregiver demonstrates understanding of disease process, treatment plan, medications, and discharge instructions  Description: Complete learning assessment and assess knowledge base.  Interventions:  - Provide teaching at level of understanding  - Provide teaching via preferred learning methods  Outcome: Progressing     Problem: SLEEP DISTURBANCE  Goal: Will exhibit normal sleeping pattern  Description: Interventions:  -  Assess the patients sleep pattern, noting recent changes  - Administer medication as ordered  - Decrease environmental stimuli, including noise, as appropriate during the night  - Encourage the patient to actively participate in unit groups and or exercise during the day to enhance ability to achieve adequate sleep at night  - Assess the patient, in the morning, encouraging a description of sleep experience  Outcome: Progressing

## 2024-11-06 NOTE — PROGRESS NOTES
11/06/24 0847   Team Meeting   Meeting Type Daily Rounds   Team Members Present   Team Members Present Physician;Nurse;   Physician Team Member Aria   Nursing Team Member [facility]   Care Management Team Member Noa   Patient/Family Present   Patient Present No   Patient's Family Present No     Pt is medication and meal compliant. Pt denies SI/HI/AVH. Pt is calm and cooperative. Pt's discharge is pending placement.

## 2024-11-06 NOTE — NURSING NOTE
Patient is visible on unit, attended group in dayroom, limited interaction with peers and staff. Patient presents with depressed mood, flat affect, and disheveled appearance. Patient denies SI/HI/AVH. No complaints or unmet needs identified at this time. Q 15 minute safety checks maintained.

## 2024-11-07 PROCEDURE — 99232 SBSQ HOSP IP/OBS MODERATE 35: CPT | Performed by: PSYCHIATRY & NEUROLOGY

## 2024-11-07 RX ADMIN — FAMOTIDINE 20 MG: 20 TABLET ORAL at 17:27

## 2024-11-07 RX ADMIN — CYANOCOBALAMIN TAB 1000 MCG 1000 MCG: 1000 TAB at 08:41

## 2024-11-07 RX ADMIN — LEVOTHYROXINE SODIUM 37.5 MCG: 125 TABLET ORAL at 06:29

## 2024-11-07 RX ADMIN — ATORVASTATIN CALCIUM 10 MG: 10 TABLET, FILM COATED ORAL at 17:27

## 2024-11-07 RX ADMIN — SERTRALINE HYDROCHLORIDE 150 MG: 100 TABLET ORAL at 08:41

## 2024-11-07 RX ADMIN — FAMOTIDINE 20 MG: 20 TABLET ORAL at 08:41

## 2024-11-07 RX ADMIN — CHOLECALCIFEROL TAB 25 MCG (1000 UNIT) 2000 UNITS: 25 TAB at 08:41

## 2024-11-07 RX ADMIN — ARIPIPRAZOLE 5 MG: 5 TABLET ORAL at 08:41

## 2024-11-07 NOTE — NURSING NOTE
Patient is calm and cooperative upon approach. Patient is visible on unit, walking. Patient denies SI/HI/AH/VH. Patient is compliant with meds and meals.

## 2024-11-07 NOTE — PLAN OF CARE
Problem: Ineffective Coping  Goal: Participates in unit activities  Description: Interventions:  - Provide therapeutic environment   - Provide required programming   - Redirect inappropriate behaviors   Outcome: Progressing     Problem: Depression  Goal: Treatment Goal: Demonstrate behavioral control of depressive symptoms, verbalize feelings of improved mood/affect, and adopt new coping skills prior to discharge  Outcome: Progressing  Goal: Verbalize thoughts and feelings  Description: Interventions:  - Assess and re-assess patient's level of risk   - Engage patient in 1:1 interactions, daily, for a minimum of 15 minutes   - Encourage patient to express feelings, fears, frustrations, hopes   Outcome: Progressing  Goal: Refrain from harming self  Description: Interventions:  - Monitor patient closely, per order   - Supervise medication ingestion, monitor effects and side effects   Outcome: Progressing  Goal: Refrain from isolation  Description: Interventions:  - Develop a trusting relationship   - Encourage socialization   Outcome: Progressing     Problem: Anxiety  Goal: Anxiety is at manageable level  Description: Interventions:  - Assess and monitor patient's anxiety level.   - Monitor for signs and symptoms (heart palpitations, chest pain, shortness of breath, headaches, nausea, feeling jumpy, restlessness, irritable, apprehensive).   - Collaborate with interdisciplinary team and initiate plan and interventions as ordered.  - [place] patient to unit/surroundings  - Explain treatment plan  - Encourage participation in care  - Encourage verbalization of concerns/fears  - Identify coping mechanisms  - Assist in developing anxiety-reducing skills  - Administer/offer alternative therapies  - Limit or eliminate stimulants  Outcome: Progressing     Problem: DISCHARGE PLANNING - CARE MANAGEMENT  Goal: Discharge to post-acute care or home with appropriate resources  Description: INTERVENTIONS:  - Conduct assessment to  determine patient/family and health care team treatment goals, and need for post-acute services based on payer coverage, community resources, and patient preferences, and barriers to discharge  - Address psychosocial, clinical, and financial barriers to discharge as identified in assessment in conjunction with the patient/family and health care team  - Arrange appropriate level of post-acute services according to patient’s   needs and preference and payer coverage in collaboration with the physician and health care team  - Communicate with and update the patient/family, physician, and health care team regarding progress on the discharge plan  - Arrange appropriate transportation to post-acute venues  Outcome: Progressing     Problem: Knowledge Deficit  Goal: Patient/family/caregiver demonstrates understanding of disease process, treatment plan, medications, and discharge instructions  Description: Complete learning assessment and assess knowledge base.  Interventions:  - Provide teaching at level of understanding  - Provide teaching via preferred learning methods  Outcome: Progressing     Problem: SLEEP DISTURBANCE  Goal: Will exhibit normal sleeping pattern  Description: Interventions:  -  Assess the patients sleep pattern, noting recent changes  - Administer medication as ordered  - Decrease environmental stimuli, including noise, as appropriate during the night  - Encourage the patient to actively participate in unit groups and or exercise during the day to enhance ability to achieve adequate sleep at night  - Assess the patient, in the morning, encouraging a description of sleep experience  Outcome: Progressing

## 2024-11-07 NOTE — PROGRESS NOTES
"Progress Note - Behavioral Health   Name: Franco Roberson 39 y.o. male I MRN: [id number]  Unit/Bed#: -02 I Date of Admission: 5/14/2024   Date of Service: 11/7/2024 I Hospital Day: 177     Assessment & Plan  MDD (major depressive disorder), recurrent severe, without psychosis (HCC)  No medications changes at this time, will continue to monitor and optimize as indicated:  Abilify 5 mg daily as mood adjunct   Zoloft 150 mg daily for depression and anxiety  Continue to encourage participation in group therapy, milieu therapy and occupational therapy.  Continue to assess for side effects of medications.  Continue collaboration with SLIM for medical co-morbidities as indicated.  Continue discussion with CM/SW to assist with obtaining collateral, disposition planning, and the implementation of patient-centered individualized plan of care.  Continue frequent safety checks and vitals per unit protocol.    Risks, benefits and possible side effects of Medications: Risks, benefits, and possible side effects of medications have previously been explained. No new medications at this time.      Legal status: 201    Disposition: to be determined, pending SOAR application for potential group home placement. OT Cognitive Evaluation completed: \"Pt would benefit from discharge to a supportive environment that can provide checks for safety and compliance with IADLs. \"      No associated orders from this encounter found during lookback period of 72 hours.  Autism spectrum disorder  Continue supportive care    No associated orders from this encounter found during lookback period of 72 hours.      Current medications:  Current Facility-Administered Medications   Medication Dose Route Frequency Provider Last Rate    acetaminophen  650 mg Oral Q6H PRN Basilio Brown MD      acetaminophen  650 mg Oral Q4H PRN Basilio Brown MD      acetaminophen  975 mg Oral Q6H PRN Basilio Brown MD      aluminum-magnesium " hydroxide-simethicone  30 mL Oral Q4H PRN Basilio Brown MD      ARIPiprazole  5 mg Oral Daily Basilio Panda MD      Artificial Tears  1 drop Both Eyes Q3H PRN Basilio Brown MD      atorvastatin  10 mg Oral Daily With Dinner WALLY Baptiste      benztropine  1 mg Intramuscular Q4H PRN Max 6/day Basilio Brown MD      benztropine  1 mg Oral Q4H PRN Max 6/day Basilio Brown MD      Cholecalciferol  2,000 Units Oral Daily WALLY Baptiste      cyanocobalamin  1,000 mcg Oral Daily WALLY Baptiste      Diclofenac Sodium  2 g Topical 4x Daily PRN WALLY Baptiste      hydrOXYzine HCL  50 mg Oral Q6H PRN Max 4/day Basilio Brown MD      Or    diphenhydrAMINE  50 mg Intramuscular Q6H PRN Basilio Brown MD      diphenhydrAMINE-zinc acetate   Topical Daily PRN Raj Guerrero MD      famotidine  20 mg Oral BID WALLY Baptiste      hydrOXYzine HCL  100 mg Oral Q6H PRN Max 4/day Basilio Brown MD      Or    LORazepam  2 mg Intramuscular Q6H PRN Basilio Brown MD      hydrOXYzine HCL  25 mg Oral Q6H PRN Max 4/day Basilio Brown MD      levothyroxine  37.5 mcg Oral Early Morning WALLY Baptiste      melatonin  6 mg Oral HS PRN WALLY Gomez      methocarbamol  500 mg Oral Q6H PRN WALLY Baptiste      OLANZapine  5 mg Oral Q4H PRN Max 3/day Basilio Brown MD      Or    OLANZapine  2.5 mg Intramuscular Q4H PRN Max 3/day Basilio Brown MD      OLANZapine  5 mg Oral Q3H PRN Max 3/day Basilio Brown MD      Or    OLANZapine  5 mg Intramuscular Q3H PRN Max 3/day Basilio Brown MD      OLANZapine  2.5 mg Oral Q4H PRN Max 6/day Basilio Brown MD      polyethylene glycol  17 g Oral Daily PRN Basilio Brown MD      propranolol  10 mg Oral Q8H PRN Basilio Brown MD      senna-docusate sodium  1 tablet Oral Daily PRN Basilio Brown MD      sertraline   150 mg Oral Daily WALLY Gomez      traZODone  50 mg Oral HS PRN Basilio Brown MD         Risks / Benefits of Treatment:    Risks, benefits, and possible side effects of medications explained to patient and patient verbalizes understanding and agreement for treatment.    Subjective:    Behavior over the last 24 hours: unchanged.     The patient was evaluated this morning for continuity of care and no acute distress noted throughout the evaluation.  Over the past 24 hours staff noted the patient was withdrawn to room most of the day, admits to mild depression but states that it does not affect him.  He attends some groups but does not participate actively.  Staff note a decrease in hygiene.    Patient was visited on unit for continuing care; chart reviewed and discussed with multidisciplinary treatment team.  On approach, the patient was calm and cooperative. Franco reports today that he is a bit nervous about his current roommate due to aggressive comments being made. Franco states that he experienced disrupted sleep due to new roommate change but he still feels rested. Franco was reminded to speak with staff if he feels unsafe or threatened by new roommate.  Denied any changes in mood, appetite, and energy level. No problem initiating and maintaining sleep.  Denied A/VH currently.  Denied SI/HI, intent or plan upon direct inquiry at this time.    Patient continues to be selectively interactive with staff and peers. No reports of aggression or self-injurious behavior on unit. No PRN medications used in the past 24 hours.    Patient accepted all offered medications and no adverse effects of medications noted or reported.    Sleep: slept off and on, frequent awakenings  Appetite: normal  Medication side effects: No   ROS: no complaints    Mental Status Examination:  Appearance:  age appropriate, casually dressed, looks stated age   Behavior:  pleasant, cooperative, calm   Speech:  scant, soft  "  Mood:  \"I'm doing okay\"   Affect:  constricted   Thought Process:  organized, linear   Associations: intact associations   Thought Content:  no overt delusions   Perceptual Disturbances: no auditory hallucinations, no visual hallucinations, does not appear responding to internal stimuli   Risk Potential: Suicidal ideation - None at present  Homicidal ideation - None at present  Potential for aggression - No   Sensorium:  oriented to person, place, and time/date   Memory:  recent and remote memory grossly intact   Consciousness:  alert and awake   Attention/Concentration: attention span and concentration are age appropriate   Insight:  limited   Judgment: limited   Gait/Station: normal gait/station   Motor Activity: no abnormal movements       Vital signs in last 24 hours:    Temp:  [96.7 °F (35.9 °C)-97 °F (36.1 °C)] 97 °F (36.1 °C)  HR:  [76-78] 78  BP: (114-122)/(70-73) 114/73  Resp:  [16] 16  SpO2:  [96 %] 96 %  O2 Device: None (Room air)         Laboratory results: I have personally reviewed all pertinent laboratory/tests results    Most Recent Labs:   Lab Results   Component Value Date    WBC 7.01 06/27/2024    RBC 4.54 06/27/2024    HGB 14.5 06/27/2024    HCT 44.4 06/27/2024     06/27/2024    RDW 12.0 06/27/2024    NEUTROABS 4.01 06/27/2024    SODIUM 139 07/10/2024    K 3.8 07/10/2024     07/10/2024    CO2 27 07/10/2024    BUN 16 07/10/2024    CREATININE 0.92 07/10/2024    GLUC 87 07/10/2024    CALCIUM 9.3 07/10/2024    AST 28 07/10/2024    ALT 36 07/10/2024    ALKPHOS 66 07/10/2024    TP 7.7 07/10/2024    ALB 4.4 07/10/2024    TBILI 0.55 07/10/2024    CHOLESTEROL 168 09/19/2024    HDL 42 09/19/2024    TRIG 137 09/19/2024    LDLCALC 99 09/19/2024    NONHDLC 126 09/19/2024    MDA3VQIHVLMH 6.241 (H) 09/10/2024    FREET4 0.56 (L) 09/10/2024    SYPHILISAB Non-reactive 05/15/2024       Progress Toward Goals: Franco is currently assessed as being at their baseline with continued need for medication " management, supervision for safety and ADL’s. These services are not currently available in a less restrictive environment necessitating continued hospital stay.  Franco should remain on the unit until these services are available, due to likelihood of mental decompensation and readmission if discharged to an unsupervised setting.  Assertive discharge planning and collaboration with multiple providers (inpatient and community based) remains ongoing.  Franco is currently awaiting for private pay group home versus safe [place] placement.      Counseling / Coordination of Care:    Patient's progress discussed with staff in treatment team meeting.  Medication changes reviewed with staff in treatment team meeting.  Patient's progress reviewed with nursing staff.  Medications, treatment progress and treatment plan reviewed with patient.  Medication changes discussed with patient.  Medication education provided to patient.  Patient's progress reviewed with case management staff.  Reassurance and supportive therapy provided.}    Lita Knapp,  11/07/24    This note was completed in part utilizing Dragon dictation Software. Grammatical, translation, syntax errors, random word insertions, spelling mistakes, and incomplete sentences may be an occasional consequence of this system secondary to software limitations with voice recognition, ambient noise, and hardware issues. If you have any questions or concerns about the content, text, or information contained within the body of this dictation, please contact the provider for clarification.

## 2024-11-07 NOTE — PROGRESS NOTES
11/07/24 0850   Team Meeting   Meeting Type Daily Rounds   Team Members Present   Team Members Present Physician;Nurse;   Physician Team Member Aria   Nursing Team Member [facility] Management Team Member Noa   Patient/Family Present   Patient Present No   Patient's Family Present No     Pt's discharge is pending placement. Pt is calm cooperative. Pt is medication and meal compliant. Pt denies SI/HI/AVH.

## 2024-11-08 PROCEDURE — 99232 SBSQ HOSP IP/OBS MODERATE 35: CPT | Performed by: PSYCHIATRY & NEUROLOGY

## 2024-11-08 RX ADMIN — CHOLECALCIFEROL TAB 25 MCG (1000 UNIT) 2000 UNITS: 25 TAB at 08:07

## 2024-11-08 RX ADMIN — ARIPIPRAZOLE 5 MG: 5 TABLET ORAL at 08:07

## 2024-11-08 RX ADMIN — ATORVASTATIN CALCIUM 10 MG: 10 TABLET, FILM COATED ORAL at 17:19

## 2024-11-08 RX ADMIN — FAMOTIDINE 20 MG: 20 TABLET ORAL at 17:19

## 2024-11-08 RX ADMIN — FAMOTIDINE 20 MG: 20 TABLET ORAL at 08:07

## 2024-11-08 RX ADMIN — LEVOTHYROXINE SODIUM 37.5 MCG: 125 TABLET ORAL at 06:22

## 2024-11-08 RX ADMIN — CYANOCOBALAMIN TAB 1000 MCG 1000 MCG: 1000 TAB at 08:07

## 2024-11-08 RX ADMIN — SERTRALINE HYDROCHLORIDE 150 MG: 100 TABLET ORAL at 08:07

## 2024-11-08 NOTE — PROGRESS NOTES
"Progress Note - Behavioral Health   Name: Franco Roberson 39 y.o. male I MRN: [id number]  Unit/Bed#: -02 I Date of Admission: 5/14/2024   Date of Service: 11/8/2024 I Hospital Day: 178     Assessment & Plan  MDD (major depressive disorder), recurrent severe, without psychosis (HCC)  No medications changes at this time, will continue to monitor and optimize as indicated:  Abilify 5 mg daily as mood adjunct   Zoloft 150 mg daily for depression and anxiety  Continue to encourage participation in group therapy, milieu therapy and occupational therapy.  Continue to assess for side effects of medications.  Continue collaboration with [facility] for medical co-morbidities as indicated.  Continue discussion with CM/SW to assist with obtaining collateral, disposition planning, and the implementation of patient-centered individualized plan of care.  Continue frequent safety checks and vitals per unit protocol.    Risks, benefits and possible side effects of Medications: Risks, benefits, and possible side effects of medications have previously been explained. No new medications at this time.      Legal status: 201    Disposition: to be determined, pending SOAR application for potential group home placement. OT Cognitive Evaluation completed: \"Pt would benefit from discharge to a supportive environment that can provide checks for safety and compliance with IADLs. \"      No associated orders from this encounter found during lookback period of 72 hours.  Autism spectrum disorder  Continue supportive care    No associated orders from this encounter found during lookback period of 72 hours.      Current medications:  Current Facility-Administered Medications   Medication Dose Route Frequency Provider Last Rate    acetaminophen  650 mg Oral Q6H PRN Basilio Brown MD      acetaminophen  650 mg Oral Q4H PRN Basilio Brown MD      acetaminophen  975 mg Oral Q6H PRN Basilio Brown MD      aluminum-magnesium " hydroxide-simethicone  30 mL Oral Q4H PRN Basilio Brown MD      ARIPiprazole  5 mg Oral Daily Basilio Panda MD      Artificial Tears  1 drop Both Eyes Q3H PRN Basilio Brown MD      atorvastatin  10 mg Oral Daily With Dinner WALLY Baptiste      benztropine  1 mg Intramuscular Q4H PRN Max 6/day Basilio Brown MD      benztropine  1 mg Oral Q4H PRN Max 6/day Basilio Brown MD      Cholecalciferol  2,000 Units Oral Daily WALLY Baptiste      cyanocobalamin  1,000 mcg Oral Daily WALLY Baptiste      Diclofenac Sodium  2 g Topical 4x Daily PRN WALLY Baptiste      hydrOXYzine HCL  50 mg Oral Q6H PRN Max 4/day Basilio Brown MD      Or    diphenhydrAMINE  50 mg Intramuscular Q6H PRN Basilio Brown MD      diphenhydrAMINE-zinc acetate   Topical Daily PRN Raj Guerrero MD      famotidine  20 mg Oral BID WALLY Baptiste      hydrOXYzine HCL  100 mg Oral Q6H PRN Max 4/day Basilio Brown MD      Or    LORazepam  2 mg Intramuscular Q6H PRN Basilio Brown MD      hydrOXYzine HCL  25 mg Oral Q6H PRN Max 4/day Basilio Brown MD      levothyroxine  37.5 mcg Oral Early Morning WALLY Baptiste      melatonin  6 mg Oral HS PRN WALLY Gomez      methocarbamol  500 mg Oral Q6H PRN WALLY Baptiste      OLANZapine  5 mg Oral Q4H PRN Max 3/day Basilio Brown MD      Or    OLANZapine  2.5 mg Intramuscular Q4H PRN Max 3/day Basilio Brown MD      OLANZapine  5 mg Oral Q3H PRN Max 3/day Basilio Brown MD      Or    OLANZapine  5 mg Intramuscular Q3H PRN Max 3/day Basilio Brown MD      OLANZapine  2.5 mg Oral Q4H PRN Max 6/day Basilio Brown MD      polyethylene glycol  17 g Oral Daily PRN Basilio Brown MD      propranolol  10 mg Oral Q8H PRN Basilio Brown MD      senna-docusate sodium  1 tablet Oral Daily PRN Basilio Brown MD      sertraline  " 150 mg Oral Daily WALLY Gomez      traZODone  50 mg Oral HS PRN Basilio Brown MD         Risks / Benefits of Treatment:    Risks, benefits, and possible side effects of medications explained to patient and patient verbalizes understanding and agreement for treatment.    Subjective:    Behavior over the last 24 hours: unchanged.     The patient was evaluated this morning for continuity of care and no acute distress noted throughout the evaluation.  Over the past 24 hours staff noted the patient was calm and cooperative, no behavioral issues.      Patient was visited on unit for continuing care; chart reviewed and discussed with multidisciplinary treatment team.  On approach, the patient was calm and cooperative.  Franco reports that sleep was difficult due to readiness on the unit.  He still states that he feels rested.  He describes his mood as good and has no concerns at this time.  Denied any changes in mood, appetite, and energy level. No problem initiating and maintaining sleep.  Denied A/VH currently.  Denied SI/HI, intent or plan upon direct inquiry at this time.    Patient continues to be selectively interactive with staff and peers. No reports of aggression or self-injurious behavior on unit. No PRN medications used in the past 24 hours.    Patient accepted all offered medications and no adverse effects of medications noted or reported.    Sleep: normal  Appetite: normal  Medication side effects: No   ROS: no complaints    Mental Status Examination:  Appearance:  age appropriate, casually dressed, looks stated age   Behavior:  pleasant, cooperative, calm   Speech:  normal rate and volume   Mood:  \"Good\"   Affect:  constricted   Thought Process:  organized, linear   Associations: concrete associations   Thought Content:  no overt delusions   Perceptual Disturbances: no auditory hallucinations, no visual hallucinations, does not appear responding to internal stimuli   Risk Potential: " Suicidal ideation - None at present  Homicidal ideation - None at present  Potential for aggression - No   Sensorium:  oriented to person, place, and time/date   Memory:  recent and remote memory grossly intact   Consciousness:  alert and awake   Attention/Concentration: attention span and concentration are age appropriate   Insight:  limited   Judgment: limited   Gait/Station: normal gait/station   Motor Activity: no abnormal movements       Vital signs in last 24 hours:    Temp:  [96.9 °F (36.1 °C)-97.1 °F (36.2 °C)] 97.1 °F (36.2 °C)  HR:  [70-73] 70  BP: (108-136)/(70-76) 136/76  Resp:  [16] 16  SpO2:  [97 %] 97 %  O2 Device: None (Room air)         Laboratory results: I have personally reviewed all pertinent laboratory/tests results    Most Recent Labs:   Lab Results   Component Value Date    WBC 7.01 06/27/2024    RBC 4.54 06/27/2024    HGB 14.5 06/27/2024    HCT 44.4 06/27/2024     06/27/2024    RDW 12.0 06/27/2024    NEUTROABS 4.01 06/27/2024    SODIUM 139 07/10/2024    K 3.8 07/10/2024     07/10/2024    CO2 27 07/10/2024    BUN 16 07/10/2024    CREATININE 0.92 07/10/2024    GLUC 87 07/10/2024    CALCIUM 9.3 07/10/2024    AST 28 07/10/2024    ALT 36 07/10/2024    ALKPHOS 66 07/10/2024    TP 7.7 07/10/2024    ALB 4.4 07/10/2024    TBILI 0.55 07/10/2024    CHOLESTEROL 168 09/19/2024    HDL 42 09/19/2024    TRIG 137 09/19/2024    LDLCALC 99 09/19/2024    NONHDLC 126 09/19/2024    SJC8CDGSXCUV 6.241 (H) 09/10/2024    FREET4 0.56 (L) 09/10/2024    SYPHILISAB Non-reactive 05/15/2024       Progress Toward Goals: Franco is currently assessed as being at their baseline with continued need for medication management, supervision for safety and ADL’s. These services are not currently available in a less restrictive environment necessitating continued hospital stay.  Franco should remain on the unit until these services are available, due to likelihood of mental decompensation and readmission if discharged  to an unsupervised setting.  Assertive discharge planning and collaboration with multiple providers (inpatient and community based) remains ongoing.  Franco is currently awaiting for private pay group home versus safe [place]      Counseling / Coordination of Care:    Patient's progress discussed with staff in treatment team meeting.  Medication changes reviewed with staff in treatment team meeting.  Patient's progress reviewed with nursing staff.  Medications, treatment progress and treatment plan reviewed with patient.  Medication changes discussed with patient.  Medication education provided to patient.  Patient's progress reviewed with case management staff.  Reassurance and supportive therapy provided.}    Lita Knapp, DO 11/08/24    This note was completed in part utilizing Dragon dictation Software. Grammatical, translation, syntax errors, random word insertions, spelling mistakes, and incomplete sentences may be an occasional consequence of this system secondary to software limitations with voice recognition, ambient noise, and hardware issues. If you have any questions or concerns about the content, text, or information contained within the body of this dictation, please contact the provider for clarification.

## 2024-11-08 NOTE — PLAN OF CARE
Problem: Ineffective Coping  Goal: Participates in unit activities  Description: Interventions:  - Provide therapeutic environment   - Provide required programming   - Redirect inappropriate behaviors   Outcome: Progressing     Problem: Depression  Goal: Treatment Goal: Demonstrate behavioral control of depressive symptoms, verbalize feelings of improved mood/affect, and adopt new coping skills prior to discharge  Outcome: Progressing  Goal: Verbalize thoughts and feelings  Description: Interventions:  - Assess and re-assess patient's level of risk   - Engage patient in 1:1 interactions, daily, for a minimum of 15 minutes   - Encourage patient to express feelings, fears, frustrations, hopes   Outcome: Progressing  Goal: Refrain from harming self  Description: Interventions:  - Monitor patient closely, per order   - Supervise medication ingestion, monitor effects and side effects   Outcome: Progressing  Goal: Refrain from isolation  Description: Interventions:  - Develop a trusting relationship   - Encourage socialization   Outcome: Progressing     Problem: Anxiety  Goal: Anxiety is at manageable level  Description: Interventions:  - Assess and monitor patient's anxiety level.   - Monitor for signs and symptoms (heart palpitations, chest pain, shortness of breath, headaches, nausea, feeling jumpy, restlessness, irritable, apprehensive).   - Collaborate with interdisciplinary team and initiate plan and interventions as ordered.  - [place] patient to unit/surroundings  - Explain treatment plan  - Encourage participation in care  - Encourage verbalization of concerns/fears  - Identify coping mechanisms  - Assist in developing anxiety-reducing skills  - Administer/offer alternative therapies  - Limit or eliminate stimulants  Outcome: Progressing     Problem: DISCHARGE PLANNING - CARE MANAGEMENT  Goal: Discharge to post-acute care or home with appropriate resources  Description: INTERVENTIONS:  - Conduct assessment to  determine patient/family and health care team treatment goals, and need for post-acute services based on payer coverage, community resources, and patient preferences, and barriers to discharge  - Address psychosocial, clinical, and financial barriers to discharge as identified in assessment in conjunction with the patient/family and health care team  - Arrange appropriate level of post-acute services according to patient’s   needs and preference and payer coverage in collaboration with the physician and health care team  - Communicate with and update the patient/family, physician, and health care team regarding progress on the discharge plan  - Arrange appropriate transportation to post-acute venues  Outcome: Progressing     Problem: Knowledge Deficit  Goal: Patient/family/caregiver demonstrates understanding of disease process, treatment plan, medications, and discharge instructions  Description: Complete learning assessment and assess knowledge base.  Interventions:  - Provide teaching at level of understanding  - Provide teaching via preferred learning methods  Outcome: Progressing     Problem: SLEEP DISTURBANCE  Goal: Will exhibit normal sleeping pattern  Description: Interventions:  -  Assess the patients sleep pattern, noting recent changes  - Administer medication as ordered  - Decrease environmental stimuli, including noise, as appropriate during the night  - Encourage the patient to actively participate in unit groups and or exercise during the day to enhance ability to achieve adequate sleep at night  - Assess the patient, in the morning, encouraging a description of sleep experience  Outcome: Progressing

## 2024-11-08 NOTE — NURSING NOTE
Pt is withdrawn to self and room. Reports he plans on attending groups later in the day. Pt denies SI/HI/AH/VH but does appear depressed. Hopeful for discharge. Med and meal compliant. Denies any unmet needs or complaints at this time.

## 2024-11-08 NOTE — PROGRESS NOTES
11/08/24 0838   Team Meeting   Meeting Type Daily Rounds   Team Members Present   Team Members Present Physician;;Nurse   Physician Team Member Aria   Nursing Team Member [facility] Management Team Member Noa   Patient/Family Present   Patient Present No   Patient's Family Present No     Pt denies SI/HI/AVH. Pt is medication and meal compliant. Pt is calm and cooperative. Pt's discharge is pending placement.

## 2024-11-08 NOTE — NURSING NOTE
Patient is withdrawn to self, mostly isolative to room. Patient is calm, quiet and cooperative. Patient denies SI/HI/AVH. Patient denies depression and anxiety, however mood appears depressed and affect is flat. No complaints or unmet needs identified at this time. Q 15 minute safety checks maintained.

## 2024-11-08 NOTE — SOCIAL WORK
Cm checked in with Pt and discussed the trip to . Pt reported it was an eventful time. He described to Cm what the court of events were. Pt thanked Cm for the clothing he was able to wear out and reported it fit him good.     Cm called Rossy Avila following up on the ICMS referral that was placed.

## 2024-11-08 NOTE — SOCIAL WORK
Gabo called Rossy Winkler) and left her an email following up on the referral. Gabo completed the additional paperwork that needed to be provided.   Alejandra called Gabo back and reported she cannot make the referral active until pt has a D/C date and placement.

## 2024-11-08 NOTE — SOCIAL WORK
"Gabo called [facility] and left call back information.   [facility] Location [phone]  [facility] has 2 locations one in [facility] and [facility].  [facility] Residential health care facility, monitor medication, laundry, prepare meals.   [facility] reported their \"management team\" is going to call Cm.       Gabo called [facility] in [place]. Cm was directed to call   Edwige [phone] from admissions department.   Cm called Edwige and left call back information.   Alfonsoantwan returned Gabo's call.     Alfonsoantwan requested Pt to E-fax [phone]-  face sheet and Psych evaluation.  She reported Pt can schedule an in-person interview after clinicals are faxed.     [facility]. ($1,500) [facility] [employer].  - They reported they only have availability in [facility] at this time.     [place] ($2,000)-  No availability       [place] ($1,000) ([facility])- pt prefers this location but there is no current availability.   (1,400)- private.     They reported they provide Breakfast lunch Dinner and snack. They do laundry, and house keeping. They have onsite Psych and medical doctor. She reported wherever Pt gets accepted they will work with him to assist with getting approved for SSD. They are aware that Pt does not have income but has a savings account of 20,000.             "

## 2024-11-08 NOTE — CASE MANAGEMENT
checked- No mail for patient present- awaiting Renewed 's License as updated Photo ID. Nt received at this time.

## 2024-11-09 PROCEDURE — 99232 SBSQ HOSP IP/OBS MODERATE 35: CPT | Performed by: PSYCHIATRY & NEUROLOGY

## 2024-11-09 RX ADMIN — ARIPIPRAZOLE 5 MG: 5 TABLET ORAL at 10:00

## 2024-11-09 RX ADMIN — FAMOTIDINE 20 MG: 20 TABLET ORAL at 18:49

## 2024-11-09 RX ADMIN — SERTRALINE HYDROCHLORIDE 150 MG: 100 TABLET ORAL at 10:00

## 2024-11-09 RX ADMIN — FAMOTIDINE 20 MG: 20 TABLET ORAL at 10:00

## 2024-11-09 RX ADMIN — CHOLECALCIFEROL TAB 25 MCG (1000 UNIT) 2000 UNITS: 25 TAB at 10:00

## 2024-11-09 RX ADMIN — LEVOTHYROXINE SODIUM 37.5 MCG: 125 TABLET ORAL at 05:58

## 2024-11-09 RX ADMIN — ATORVASTATIN CALCIUM 10 MG: 10 TABLET, FILM COATED ORAL at 18:49

## 2024-11-09 RX ADMIN — CYANOCOBALAMIN TAB 1000 MCG 1000 MCG: 1000 TAB at 10:00

## 2024-11-09 NOTE — NURSING NOTE
Patient has been visible in the hallways this evening but keeping to himself. He is pleasant and appropriate in interaction despite his concerns about discharge/placement. He did not attend evening group.  He denies S.I.H.I.A/H V/H

## 2024-11-09 NOTE — PROGRESS NOTES
"Progress Note - Behavioral Health   Name: Franco Roberson 39 y.o. male I MRN: [id number]  Unit/Bed#: -02 I Date of Admission: 5/14/2024   Date of Service: 11/9/2024 I Hospital Day: 179     Assessment & Plan  MDD (major depressive disorder), recurrent severe, without psychosis (HCC)  No medications changes at this time, will continue to monitor and optimize as indicated:  Abilify 5 mg daily as mood adjunct   Zoloft 150 mg daily for depression and anxiety  Continue to encourage participation in group therapy, milieu therapy and occupational therapy.  Continue to assess for side effects of medications.  Continue collaboration with [facility] for medical co-morbidities as indicated.  Continue discussion with CM/SW to assist with obtaining collateral, disposition planning, and the implementation of patient-centered individualized plan of care.  Continue frequent safety checks and vitals per unit protocol.    Risks, benefits and possible side effects of Medications: Risks, benefits, and possible side effects of medications have previously been explained. No new medications at this time.      Legal status: 201    Disposition: to be determined, pending SOAR application for potential group home placement. OT Cognitive Evaluation completed: \"Pt would benefit from discharge to a supportive environment that can provide checks for safety and compliance with IADLs. \"      No associated orders from this encounter found during lookback period of 72 hours.  Autism spectrum disorder  Continue supportive care    No associated orders from this encounter found during lookback period of 72 hours.          ------------------------------------------------------------    Recommended Treatment Plan:   Behavioral checks every 15 minutes  Encourage participation in group therapy, milieu therapy and occupational therapy.  Assess for side effects of medications.  Collaboration with [facility] for medical co-morbidities as indicated.  Continue discussion " "with CM/SW to assist with obtaining collateral, disposition planning, and the implementation of patient-centered individualized plan of care.    Risks, benefits and possible side effects of Medications: Risks, benefits, and possible side effects of medications have previously been explained. No new medications at this time.    Legal status: 201    Disposition: pending placement      Subjective: Patient's chart was reviewed, and patient's progress and plan was discussed with treatment team. Per nursing report, Franco has been calm and cooperative on the unit. Medication compliant.    Prn medications over the past 24 hours: None    Franco was evaluated this morning for continuity of care. On examination, Franco is lying comfortably in bed and engages appropriately with this writer. He states his mood is \"alright.\" He reports sleeping well and without issues. His appetite has been normal. He denies adverse effects from medications. He denies active or passive suicidal ideation and homicidal ideation. He denies auditory or visual hallucinations.    VS: Reviewed, within normal limits    Progress Toward Goals: mood stable, medication and meal compliant, awaiting placement due to risk of decompensation if discharged to unstructured environment     Psychiatric Review of Systems:  Behavior over the last 24 hours: unchanged  Sleep: normal  Appetite: adequate  Medication side effects: none verbalized  Medical ROS: Complete review of systems is negative except as noted above.    Vital signs in last 24 hours:  Temp:  [96.8 °F (36 °C)-97.6 °F (36.4 °C)] 97.6 °F (36.4 °C)  HR:  [72] 72  BP: (117-147)/(75-76) 117/76  Resp:  [16] 16  SpO2:  [97 %] 97 %  O2 Device: None (Room air)    Mental Status Exam:    Appearance:  alert, disheveled , dark unkept hair , dressed in jay t-shirt, wrapped in blanket, poor eye contact , appears stated age, obese, and tall   Behavior:  calm, cooperative, and guarded   Speech:  spontaneous, slow, soft, " "scant, and coherent   Mood:  \"Alright\"   Affect:  constricted   Thought Process:  generally linear and goal-directed but concrete at times   Associations: concrete associations   Thought Content:  no verbalized delusions or overt paranoia   Perceptual Disturbances: denies current auditory or visual hallucinations, does not appear to be responding to internal stimuli at this time, looks distracted   Risk Potential: Suicidal ideation - None at present  Homicidal ideation - None at present  Potential for aggression - Not at present   Sensorium:  oriented to person and place   Memory:  recent and remote memory grossly intact   Consciousness:  alert and awake   Attention/Concentration: attention span and concentration appear shorter than expected for age   Insight:  limited   Judgment: limited   Gait/Station: normal gait/station, normal balance   Motor Activity: no abnormal movements     Current Medications:  Current Facility-Administered Medications   Medication Dose Route Frequency Provider Last Rate    acetaminophen  650 mg Oral Q6H PRN Basilio Brown MD      acetaminophen  650 mg Oral Q4H PRN Basilio Borwn MD      acetaminophen  975 mg Oral Q6H PRN Basilio Brown MD      aluminum-magnesium hydroxide-simethicone  30 mL Oral Q4H PRN Basilio Brown MD      ARIPiprazole  5 mg Oral Daily Basilio Panda MD      Artificial Tears  1 drop Both Eyes Q3H PRN Basilio Brown MD      atorvastatin  10 mg Oral Daily With Dinner WALLY Baptiste      benztropine  1 mg Intramuscular Q4H PRN Max 6/day Basilio Brown MD      benztropine  1 mg Oral Q4H PRN Max 6/day Basilio Brown MD      Cholecalciferol  2,000 Units Oral Daily WALLY Baptiste      cyanocobalamin  1,000 mcg Oral Daily WALLY Baptiste      Diclofenac Sodium  2 g Topical 4x Daily PRN WALLY Baptiste      hydrOXYzine HCL  50 mg Oral Q6H PRN Max 4/day Basilio Brown MD      Or "    diphenhydrAMINE  50 mg Intramuscular Q6H PRN Basilio Brown MD      diphenhydrAMINE-zinc acetate   Topical Daily PRN Raj Guerrero MD      famotidine  20 mg Oral BID WALLY Baptiste      hydrOXYzine HCL  100 mg Oral Q6H PRN Max 4/day Basilio Brown MD      Or    LORazepam  2 mg Intramuscular Q6H PRN Basilio Brown MD      hydrOXYzine HCL  25 mg Oral Q6H PRN Max 4/day Basilio Brown MD      levothyroxine  37.5 mcg Oral Early Morning WALLY Baptiste      melatonin  6 mg Oral HS PRN WALLY Gomez      methocarbamol  500 mg Oral Q6H PRN WALLY Baptiste      OLANZapine  5 mg Oral Q4H PRN Max 3/day Basilio Brown MD      Or    OLANZapine  2.5 mg Intramuscular Q4H PRN Max 3/day Basilio Brown MD      OLANZapine  5 mg Oral Q3H PRN Max 3/day Basilio Brown MD      Or    OLANZapine  5 mg Intramuscular Q3H PRN Max 3/day Basilio Brown MD      OLANZapine  2.5 mg Oral Q4H PRN Max 6/day Basilio Brown MD      polyethylene glycol  17 g Oral Daily PRN Basilio Brown MD      propranolol  10 mg Oral Q8H PRN Basilio Brown MD      senna-docusate sodium  1 tablet Oral Daily PRN Basilio Brown MD      sertraline  150 mg Oral Daily WALLY Gomez      traZODone  50 mg Oral HS PRN Basilio Brown MD         Behavioral Health Medications: all current active meds have been reviewed. Changes as in plan section above.    Laboratory results:  I have personally reviewed all pertinent laboratory/tests results.   No results found for this or any previous visit (from the past 48 hour(s)).     Counseling / Coordination of Care:  Patient's progress reviewed with nursing staff.  Supportive therapy provided to patient.  Encouraged participation in milieu and group therapy on the unit.      Felicita Ortiz DO 11/09/24  Psychiatry Residency, PGY-II  This note has been constructed using a voice recognition system. There may  be translation, syntax, or grammatical errors. If you have any questions, please contact the dictating author.

## 2024-11-09 NOTE — PLAN OF CARE
Problem: Depression  Goal: Treatment Goal: Demonstrate behavioral control of depressive symptoms, verbalize feelings of improved mood/affect, and adopt new coping skills prior to discharge  Outcome: Progressing  Goal: Verbalize thoughts and feelings  Description: Interventions:  - Assess and re-assess patient's level of risk   - Engage patient in 1:1 interactions, daily, for a minimum of 15 minutes   - Encourage patient to express feelings, fears, frustrations, hopes   Outcome: Progressing  Goal: Refrain from harming self  Description: Interventions:  - Monitor patient closely, per order   - Supervise medication ingestion, monitor effects and side effects   Outcome: Progressing  Goal: Refrain from isolation  Description: Interventions:  - Develop a trusting relationship   - Encourage socialization   Outcome: Progressing     Problem: Anxiety  Goal: Anxiety is at manageable level  Description: Interventions:  - Assess and monitor patient's anxiety level.   - Monitor for signs and symptoms (heart palpitations, chest pain, shortness of breath, headaches, nausea, feeling jumpy, restlessness, irritable, apprehensive).   - Collaborate with interdisciplinary team and initiate plan and interventions as ordered.  - [place] patient to unit/surroundings  - Explain treatment plan  - Encourage participation in care  - Encourage verbalization of concerns/fears  - Identify coping mechanisms  - Assist in developing anxiety-reducing skills  - Administer/offer alternative therapies  - Limit or eliminate stimulants  Outcome: Progressing     Problem: Knowledge Deficit  Goal: Patient/family/caregiver demonstrates understanding of disease process, treatment plan, medications, and discharge instructions  Description: Complete learning assessment and assess knowledge base.  Interventions:  - Provide teaching at level of understanding  - Provide teaching via preferred learning methods  Outcome: Progressing     Problem: SLEEP  DISTURBANCE  Goal: Will exhibit normal sleeping pattern  Description: Interventions:  -  Assess the patients sleep pattern, noting recent changes  - Administer medication as ordered  - Decrease environmental stimuli, including noise, as appropriate during the night  - Encourage the patient to actively participate in unit groups and or exercise during the day to enhance ability to achieve adequate sleep at night  - Assess the patient, in the morning, encouraging a description of sleep experience  Outcome: Progressing

## 2024-11-10 PROCEDURE — 99232 SBSQ HOSP IP/OBS MODERATE 35: CPT | Performed by: PSYCHIATRY & NEUROLOGY

## 2024-11-10 RX ADMIN — LEVOTHYROXINE SODIUM 37.5 MCG: 125 TABLET ORAL at 06:44

## 2024-11-10 RX ADMIN — SERTRALINE HYDROCHLORIDE 150 MG: 100 TABLET ORAL at 09:15

## 2024-11-10 RX ADMIN — CYANOCOBALAMIN TAB 1000 MCG 1000 MCG: 1000 TAB at 09:15

## 2024-11-10 RX ADMIN — FAMOTIDINE 20 MG: 20 TABLET ORAL at 17:22

## 2024-11-10 RX ADMIN — ATORVASTATIN CALCIUM 10 MG: 10 TABLET, FILM COATED ORAL at 17:22

## 2024-11-10 RX ADMIN — CHOLECALCIFEROL TAB 25 MCG (1000 UNIT) 2000 UNITS: 25 TAB at 09:15

## 2024-11-10 RX ADMIN — FAMOTIDINE 20 MG: 20 TABLET ORAL at 09:15

## 2024-11-10 RX ADMIN — ARIPIPRAZOLE 5 MG: 5 TABLET ORAL at 09:15

## 2024-11-10 NOTE — NURSING NOTE
Patient is med/meal compliant.  He is pleasant and calm.  Patient denies SI/HI and A/V hallucinations.

## 2024-11-10 NOTE — NURSING NOTE
Pt calm and cooperative with staff. Denies any SI/HI/AVH at this time. Pt has a flat affect, isolative mainly to room and self, makes needs known. Adherent with scheduled medications. Pt currently denies any unmet needs. Q15 observation maintained.

## 2024-11-10 NOTE — ASSESSMENT & PLAN NOTE
"No medications changes at this time, optimize as indicated;  Abilify 5 mg daily as mood adjunct   Zoloft 150 mg daily for depression and anxiety  Continue to encourage participation in group therapy, milieu therapy and occupational therapy.  Continue to assess for side effects of medications.  Continue collaboration with [facility] for medical co-morbidities as indicated.  Continue discussion with CM/SW to assist with obtaining collateral, disposition planning, and the implementation of patient-centered individualized plan of care.  Continue frequent safety checks and vitals per unit protocol.    Risks, benefits and possible side effects of Medications: Risks, benefits, and possible side effects of medications have previously been explained. No new medications at this time.      Legal status: 201    Disposition: to be determined, pending SOAR application for potential group home placement. OT Cognitive Evaluation completed: \"Pt would benefit from discharge to a supportive environment that can provide checks for safety and compliance with IADLs. \"      No associated orders from this encounter found during lookback period of 72 hours.  "

## 2024-11-10 NOTE — PROGRESS NOTES
"Progress Note - Behavioral Health   Name: Franco Roberson 39 y.o. male I MRN: [id number]  Unit/Bed#: -02 I Date of Admission: 5/14/2024   Date of Service: 11/10/2024 I Hospital Day: 180     Assessment & Plan  MDD (major depressive disorder), recurrent severe, without psychosis (HCC)  No medications changes at this time, optimize as indicated;  Abilify 5 mg daily as mood adjunct   Zoloft 150 mg daily for depression and anxiety  Continue to encourage participation in group therapy, milieu therapy and occupational therapy.  Continue to assess for side effects of medications.  Continue collaboration with [facility] for medical co-morbidities as indicated.  Continue discussion with CM/SW to assist with obtaining collateral, disposition planning, and the implementation of patient-centered individualized plan of care.  Continue frequent safety checks and vitals per unit protocol.    Risks, benefits and possible side effects of Medications: Risks, benefits, and possible side effects of medications have previously been explained. No new medications at this time.      Legal status: 201    Disposition: to be determined, pending SOAR application for potential group home placement. OT Cognitive Evaluation completed: \"Pt would benefit from discharge to a supportive environment that can provide checks for safety and compliance with IADLs. \"      No associated orders from this encounter found during lookback period of 72 hours.  Autism spectrum disorder  Continue supportive care    No associated orders from this encounter found during lookback period of 72 hours.          ------------------------------------------------------------    Recommended Treatment Plan:   Behavioral checks every 15 minutes  Encourage participation in group therapy, milieu therapy and occupational therapy.  Assess for side effects of medications.  Collaboration with [facility] for medical co-morbidities as indicated.  Continue discussion with CM/SW to assist with " "obtaining collateral, disposition planning, and the implementation of patient-centered individualized plan of care.    Risks, benefits and possible side effects of Medications: Risks, benefits, and possible side effects of medications have previously been explained. No new medications at this time.    Legal status: 201    Disposition: to supportive environment/group home      Subjective: Patient's chart was reviewed, and patient's progress and plan was discussed with treatment team. Per nursing report, Franco has been calm and cooperative on the unit. He is compliant with medications.     Prn medications over the past 24 hours: None    Franco was evaluated this morning for continuity of care. On examination, Franco is pleasant and cooperative. He states his mood is \"alright.\" He reports sleeping well. His appetite has been normal and he had pancakes this morning. He denies adverse effects from medications. He denies active or passive suicidal ideation and homicidal ideation. He denies auditory or visual hallucinations.    VS: Reviewed, within normal limits    Progress Toward Goals: Franco appears to be at baseline, but requires inpatient treatment due to risk of decompensation if discharged to an unstructured environment    Psychiatric Review of Systems:  Behavior over the last 24 hours: unchanged  Sleep: normal  Appetite: adequate  Medication side effects: none verbalized  Medical ROS: Complete review of systems is negative except as noted above.    Vital signs in last 24 hours:  Temp:  [96.7 °F (35.9 °C)-97.7 °F (36.5 °C)] 97.7 °F (36.5 °C)  HR:  [72-78] 72  BP: (109-119)/(70-74) 119/70  Resp:  [16] 16  SpO2:  [97 %] 97 %  O2 Device: None (Room air)    Mental Status Exam:    Appearance:  alert, disheveled , unkept hair , wrapped in blanket, casually dressed, poor eye contact , appears stated age, obese, and tall   Behavior:  calm and cooperative   Speech:  spontaneous, slow, soft, scant, and coherent   Mood:  " "\"Alright\"   Affect:  Blunted   Thought Process:  [place], but generally linear and goal-directed   Associations: concrete associations   Thought Content:  no verbalized delusions or overt paranoia   Perceptual Disturbances: denies current auditory or visual hallucinations, does not appear to be responding to internal stimuli at this time, and distracted   Risk Potential: Suicidal ideation - None at present  Homicidal ideation - None at present  Potential for aggression - No   Sensorium:  oriented to person and place   Memory:  recent and remote memory grossly intact   Consciousness:  alert and awake   Attention/Concentration: attention span and concentration appear shorter than expected for age   Insight:  limited   Judgment: limited   Gait/Station: Lying in bed   Motor Activity: no abnormal movements     Current Medications:  Current Facility-Administered Medications   Medication Dose Route Frequency Provider Last Rate    acetaminophen  650 mg Oral Q6H PRN Basilio Brown MD      acetaminophen  650 mg Oral Q4H PRN Basilio Brown MD      acetaminophen  975 mg Oral Q6H PRN Basilio Brown MD      aluminum-magnesium hydroxide-simethicone  30 mL Oral Q4H PRN Basilio Brown MD      ARIPiprazole  5 mg Oral Daily Basilio Panda MD      Artificial Tears  1 drop Both Eyes Q3H PRN Basilio Brown MD      atorvastatin  10 mg Oral Daily With Dinner WALLY Baptiste      benztropine  1 mg Intramuscular Q4H PRN Max 6/day Basilio Brown MD      benztropine  1 mg Oral Q4H PRN Max 6/day Basilio Brown MD      Cholecalciferol  2,000 Units Oral Daily WALLY Baptiste      cyanocobalamin  1,000 mcg Oral Daily WALLY Baptiste      Diclofenac Sodium  2 g Topical 4x Daily PRN WALLY Baptiste      hydrOXYzine HCL  50 mg Oral Q6H PRN Max 4/day Basilio Brown MD      Or    diphenhydrAMINE  50 mg Intramuscular Q6H PRN Basilio Brown MD      " diphenhydrAMINE-zinc acetate   Topical Daily PRN Raj Guerrero MD      famotidine  20 mg Oral BID WALLY Baptiste      hydrOXYzine HCL  100 mg Oral Q6H PRN Max 4/day Basilio Brown MD      Or    LORazepam  2 mg Intramuscular Q6H PRN Basilio Brown MD      hydrOXYzine HCL  25 mg Oral Q6H PRN Max 4/day Basilio Brown MD      levothyroxine  37.5 mcg Oral Early Morning WALLY Baptiste      melatonin  6 mg Oral HS PRN WALLY Gomez      methocarbamol  500 mg Oral Q6H PRN WALLY Baptiste      OLANZapine  5 mg Oral Q4H PRN Max 3/day Basilio Brown MD      Or    OLANZapine  2.5 mg Intramuscular Q4H PRN Max 3/day Basilio Brown MD      OLANZapine  5 mg Oral Q3H PRN Max 3/day Basilio Brown MD      Or    OLANZapine  5 mg Intramuscular Q3H PRN Max 3/day Basilio Brown MD      OLANZapine  2.5 mg Oral Q4H PRN Max 6/day Basilio Brown MD      polyethylene glycol  17 g Oral Daily PRN Basilio Brown MD      propranolol  10 mg Oral Q8H PRN Basilio Brown MD      senna-docusate sodium  1 tablet Oral Daily PRN Basilio Brown MD      sertraline  150 mg Oral Daily WALLY Gomez      traZODone  50 mg Oral HS PRN Basilio Brown MD         Behavioral Health Medications: all current active meds have been reviewed. Changes as in plan section above.    Laboratory results:  I have personally reviewed all pertinent laboratory/tests results.   No results found for this or any previous visit (from the past 48 hour(s)).     Counseling / Coordination of Care:  Patient's progress reviewed with nursing staff.  Supportive therapy provided to patient.      Felicita Ortiz DO 11/10/24  Psychiatry Residency, PGY-II  This note has been constructed using a voice recognition system. There may be translation, syntax, or grammatical errors. If you have any questions, please contact the dictating author.

## 2024-11-10 NOTE — PLAN OF CARE
Problem: Depression  Goal: Treatment Goal: Demonstrate behavioral control of depressive symptoms, verbalize feelings of improved mood/affect, and adopt new coping skills prior to discharge  Outcome: Progressing  Goal: Verbalize thoughts and feelings  Description: Interventions:  - Assess and re-assess patient's level of risk   - Engage patient in 1:1 interactions, daily, for a minimum of 15 minutes   - Encourage patient to express feelings, fears, frustrations, hopes   Outcome: Progressing  Goal: Refrain from harming self  Description: Interventions:  - Monitor patient closely, per order   - Supervise medication ingestion, monitor effects and side effects   Outcome: Progressing  Goal: Refrain from isolation  Description: Interventions:  - Develop a trusting relationship   - Encourage socialization   Outcome: Progressing     Problem: Anxiety  Goal: Anxiety is at manageable level  Description: Interventions:  - Assess and monitor patient's anxiety level.   - Monitor for signs and symptoms (heart palpitations, chest pain, shortness of breath, headaches, nausea, feeling jumpy, restlessness, irritable, apprehensive).   - Collaborate with interdisciplinary team and initiate plan and interventions as ordered.  - [place] patient to unit/surroundings  - Explain treatment plan  - Encourage participation in care  - Encourage verbalization of concerns/fears  - Identify coping mechanisms  - Assist in developing anxiety-reducing skills  - Administer/offer alternative therapies  - Limit or eliminate stimulants  Outcome: Progressing     Problem: DISCHARGE PLANNING - CARE MANAGEMENT  Goal: Discharge to post-acute care or home with appropriate resources  Description: INTERVENTIONS:  - Conduct assessment to determine patient/family and health care team treatment goals, and need for post-acute services based on payer coverage, community resources, and patient preferences, and barriers to discharge  - Address psychosocial, clinical,  and financial barriers to discharge as identified in assessment in conjunction with the patient/family and health care team  - Arrange appropriate level of post-acute services according to patient’s   needs and preference and payer coverage in collaboration with the physician and health care team  - Communicate with and update the patient/family, physician, and health care team regarding progress on the discharge plan  - Arrange appropriate transportation to post-acute venues  Outcome: Progressing     Problem: Knowledge Deficit  Goal: Patient/family/caregiver demonstrates understanding of disease process, treatment plan, medications, and discharge instructions  Description: Complete learning assessment and assess knowledge base.  Interventions:  - Provide teaching at level of understanding  - Provide teaching via preferred learning methods  Outcome: Progressing     Problem: SLEEP DISTURBANCE  Goal: Will exhibit normal sleeping pattern  Description: Interventions:  -  Assess the patients sleep pattern, noting recent changes  - Administer medication as ordered  - Decrease environmental stimuli, including noise, as appropriate during the night  - Encourage the patient to actively participate in unit groups and or exercise during the day to enhance ability to achieve adequate sleep at night  - Assess the patient, in the morning, encouraging a description of sleep experience  Outcome: Progressing     Problem: Ineffective Coping  Goal: Participates in unit activities  Description: Interventions:  - Provide therapeutic environment   - Provide required programming   - Redirect inappropriate behaviors   Outcome: Not Progressing

## 2024-11-11 PROCEDURE — 99232 SBSQ HOSP IP/OBS MODERATE 35: CPT | Performed by: PSYCHIATRY & NEUROLOGY

## 2024-11-11 RX ADMIN — FAMOTIDINE 20 MG: 20 TABLET ORAL at 08:16

## 2024-11-11 RX ADMIN — SERTRALINE HYDROCHLORIDE 150 MG: 100 TABLET ORAL at 08:16

## 2024-11-11 RX ADMIN — CYANOCOBALAMIN TAB 1000 MCG 1000 MCG: 1000 TAB at 08:16

## 2024-11-11 RX ADMIN — ATORVASTATIN CALCIUM 10 MG: 10 TABLET, FILM COATED ORAL at 17:13

## 2024-11-11 RX ADMIN — CHOLECALCIFEROL TAB 25 MCG (1000 UNIT) 2000 UNITS: 25 TAB at 08:16

## 2024-11-11 RX ADMIN — ARIPIPRAZOLE 5 MG: 5 TABLET ORAL at 08:16

## 2024-11-11 RX ADMIN — LEVOTHYROXINE SODIUM 37.5 MCG: 125 TABLET ORAL at 06:24

## 2024-11-11 RX ADMIN — FAMOTIDINE 20 MG: 20 TABLET ORAL at 17:13

## 2024-11-11 NOTE — SOCIAL WORK
Cm and Pt spoke about Pt's person care home option for discharge. Pt has some additional questions. Pt was calm and appropriate. Pt and Cm spoke about the option of Pt being able to tour the [facility] prior to moving there.

## 2024-11-11 NOTE — PLAN OF CARE
Pt attends groups during the week. Pt participates appropriately and appears slightly brighter this week.

## 2024-11-11 NOTE — PROGRESS NOTES
11/11/24 1437   Team Meeting   Meeting Type Tx Team Meeting   Team Members Present   Team Members Present Physician;;Nurse   Physician Team Member Ary   Nursing Team Member [place]   Care Management Team Member Noa   Patient/Family Present   Patient Present Yes   Patient's Family Present No     Tx plan was reviewed and discussed with Pt. Pt was encouraged to attend groups. Medication was discussed with Pt. Pt signed tx plan.

## 2024-11-11 NOTE — PROGRESS NOTES
11/11/24 0841   Team Meeting   Meeting Type Daily Rounds   Team Members Present   Team Members Present Physician;Nurse;   Physician Team Member Ary   Nursing Team Member [facility] Management Team Member Noa   Patient/Family Present   Patient Present No   Patient's Family Present No     Pt is medication and meal compliant. Pt denies SI/HI/AVH. Pt is seclusive to his room. Pt's discharge is pending placement.

## 2024-11-11 NOTE — NURSING NOTE
Patient was in education group this evening and in movie group. He is pleasant and appropriate.  He denies S.I.H.I.A/H V/H

## 2024-11-11 NOTE — PROGRESS NOTES
"Progress Note - Behavioral Health   Name: Franco Roberson 39 y.o. male I MRN: [id number]  Unit/Bed#: -02 I Date of Admission: 5/14/2024   Date of Service: 11/11/2024 I Hospital Day: 181     Assessment & Plan  MDD (major depressive disorder), recurrent severe, without psychosis (HCC)  No medications changes at this time, optimize as indicated;  Given patient's length of hospitalization and the fact that he is pending placement and has not had labs drawn in 2 months, will order screening labs to assess CMP, CBC, as well as TSH, vitamin D and B12 levels given that he is receiving treatment for supplementation for these.    F/u tomorrow AM labs    Abilify 5 mg daily as mood adjunct   Zoloft 150 mg daily for depression and anxiety  Continue to encourage participation in group therapy, milieu therapy and occupational therapy.  Continue to assess for side effects of medications.  Continue collaboration with PATRICIA for medical co-morbidities as indicated.  Continue discussion with CM/SW to assist with obtaining collateral, disposition planning, and the implementation of patient-centered individualized plan of care.  Continue frequent safety checks and vitals per unit protocol.    Risks, benefits and possible side effects of Medications: Risks, benefits, and possible side effects of medications have previously been explained. No new medications at this time.      Legal status: 201    Disposition: to be determined, pending SOAR application for potential group home placement. OT Cognitive Evaluation completed: \"Pt would benefit from discharge to a supportive environment that can provide checks for safety and compliance with IADLs. \"      No associated orders from this encounter found during lookback period of 72 hours.  Autism spectrum disorder  Continue supportive care    No associated orders from this encounter found during lookback period of 72 hours.        Plan     Recommended Treatment:    - Encourage early mobility and " having a structured day  - Provide frequent re-orientation, and cognitive stimulation  - Ensure assistive devices are in proper working order (eye-glasses, hearing aids)  - Encourage adequate hydration, nutrition and monitor bowel movements  - Maintain sleep-wake cycle: Uninterrupted sleep time; low-level lighting at night  - Fall precaution  - f/u SLIM recs regarding the medical problems   - Continue medication titration and treatment plan; adjust medication to optimize treatment response and as clinically indicated. .   - Observation: routine            - VS: as per unit protocol  - Diet: Regular diet  - Encourage group attendance and milieu therapy  - Dispo: To be determined     Scheduled medications:  Current Facility-Administered Medications   Medication Dose Route Frequency Provider Last Rate    acetaminophen  650 mg Oral Q6H PRN Basilio Brown MD      acetaminophen  650 mg Oral Q4H PRN Basilio Brown MD      acetaminophen  975 mg Oral Q6H PRN Basilio Brown MD      aluminum-magnesium hydroxide-simethicone  30 mL Oral Q4H PRN Basilio Brown MD      ARIPiprazole  5 mg Oral Daily Basilio Panda MD      Artificial Tears  1 drop Both Eyes Q3H PRN Basilio Brown MD      atorvastatin  10 mg Oral Daily With Dinner WALLY Baptiste      benztropine  1 mg Intramuscular Q4H PRN Max 6/day Basilio Brown MD      benztropine  1 mg Oral Q4H PRN Max 6/day Basilio Brown MD      Cholecalciferol  2,000 Units Oral Daily WALLY Baptiste      cyanocobalamin  1,000 mcg Oral Daily WALLY Baptiste      Diclofenac Sodium  2 g Topical 4x Daily PRN WALLY Baptiste      hydrOXYzine HCL  50 mg Oral Q6H PRN Max 4/day Basilio Brown MD      Or    diphenhydrAMINE  50 mg Intramuscular Q6H PRN Basilio Brown MD      diphenhydrAMINE-zinc acetate   Topical Daily PRN Raj Guerrero MD      famotidine  20 mg Oral BID WALLY Baptiste       hydrOXYzine HCL  100 mg Oral Q6H PRN Max 4/day Basilio Brown MD      Or    LORazepam  2 mg Intramuscular Q6H PRN Basilio Brown MD      hydrOXYzine HCL  25 mg Oral Q6H PRN Max 4/day Basilio Brown MD      levothyroxine  37.5 mcg Oral Early Morning Laura Ashford, WALLY      melatonin  6 mg Oral HS PRN WALLY Gomez      methocarbamol  500 mg Oral Q6H PRN Laura Amber Ashford, CRNP      OLANZapine  5 mg Oral Q4H PRN Max 3/day Basilio Brown MD      Or    OLANZapine  2.5 mg Intramuscular Q4H PRN Max 3/day Basilio Brown MD      OLANZapine  5 mg Oral Q3H PRN Max 3/day Basilio Brown MD      Or    OLANZapine  5 mg Intramuscular Q3H PRN Max 3/day Basilio Brown MD      OLANZapine  2.5 mg Oral Q4H PRN Max 6/day Basilio Brown MD      polyethylene glycol  17 g Oral Daily PRN Basilio Brown MD      propranolol  10 mg Oral Q8H PRN Basilio Brown MD      senna-docusate sodium  1 tablet Oral Daily PRN Basilio Brown MD      sertraline  150 mg Oral Daily WALLY Gomez      traZODone  50 mg Oral HS PRN Basilio Brown MD        PRN:    acetaminophen    acetaminophen    acetaminophen    aluminum-magnesium hydroxide-simethicone    Artificial Tears    benztropine    benztropine    Diclofenac Sodium    hydrOXYzine HCL **OR** diphenhydrAMINE    diphenhydrAMINE-zinc acetate    hydrOXYzine HCL **OR** LORazepam    hydrOXYzine HCL    melatonin    methocarbamol    OLANZapine **OR** OLANZapine    OLANZapine **OR** OLANZapine    OLANZapine    polyethylene glycol    propranolol    senna-docusate sodium    traZODone       Subjective     Patient was visited on unit for continuing care; chart reviewed and discussed with multidisciplinary treatment team.  On approach, the patient was calm and cooperative. Denied any changes in mood, appetite, and energy level.  Denies any physical complaints or concerns.  He continues to endorse that his mood is  "overall doing well.  He is aware that we will be getting routine screening blood work as it has been sometime since he has had follow-up blood work.  Continues to be amenable to DC to group home setting.  No problem initiating and maintaining sleep.  Denied A/VH currently.  Denied SI/HI, intent or plan upon direct inquiry at this time.    Patient continues to be visible in the milieu and interacts with staff and peers. No reports of aggression or self-injurious behavior on unit. No PRN medications used in the past 24 hours.    Patient accepted all offered medications and no adverse effects of medications noted or reported.    Objective    Current Mental Status Evaluation:  Appearance: casually dressed, appears consistent with stated age  Motor: no psychomotor disturbances, no gait abnormalities, observed slowly pacing [place]  Behavior: cooperative, interacts with this writer appropriately  Speech: normal rate, rhythm, and volume  Mood: \"good\"  Affect: blunted  Thought Process: concrete  Thought Content: denies auditory hallucinations, denies visual hallucinations, denies delusions  Risk Potential: denies suicidal ideation, plan, or intent. Denies homicidal ideation  Sensorium: Oriented to person, place, time, and situation  Cognition: cognitive ability appears intact but was not quantitatively tested  Consciousness: alert and awake  Attention: able to focus without difficulty  Insight: limited  Judgement: limited            Vital signs in last 24 hours:    Temp:  [97.4 °F (36.3 °C)-97.5 °F (36.4 °C)] 97.4 °F (36.3 °C)  HR:  [69-79] 69  BP: (108-128)/(69-81) 108/69  Resp:  [16] 16  SpO2:  [96 %-97 %] 97 %  O2 Device: None (Room air)    Psychiatric Review of Systems:  Medication adverse effects: none  Sleep: unchanged  Appetite: unchanged  Behaviors over the past 24 hours: unchanged    Laboratory results:    I have personally reviewed all pertinent laboratory/tests results  No results found for this or any previous " visit (from the past 48 hour(s)).       Progress Toward Goals & Illness Status:   progressing, mood is stabilizing, reality testing is improving, placement pending    Patient is not at goal. They are not yet ready for discharge. The patient's condition currently requires active psychopharmacological medication management, interdisciplinary coordination with case management, and the utilization of adjunctive milieu and group therapy to augment psychopharmacological efficacy. The patient's risk of morbidity, and progression or decompensation of psychiatric disease, is higher without this current treatment.     Next of Kin  Extended Emergency Contact Information  Primary Emergency Contact: Lois Roberson  Address: [address]           [address] [place] [address] [id number] [place] [address] [place]  Home Phone: [phone]  Relation: Mother    Counseling / Coordination of Care  Patient's progress discussed with staff in treatment team meeting.  Medications, treatment progress and treatment plan reviewed with patient.  Medication education provided to patient.  Educated on importance of medication and treatment compliance.  Supportive therapy provided to patient.  Cognitive techniques utilized during the session.  Reassurance and supportive therapy provided.  Reoriented to reality and reassured.  Encouraged participation in milieu and group therapy on the unit.  Crisis/safety plan discussed with patient.       uDstin Mcallister MD  Attending Psychiatrist   [facility]      This note has been constructed using a voice recognition system. There may be translation, syntax, or grammatical errors. If you have any questions, please contact the dictating provider.

## 2024-11-11 NOTE — NURSING NOTE
Pt is pleasant and cooperative upon approach. Pt is visible on the unit, attending some groups. Medication and meal compliant. Denies SI, HI, AH, and VH. Denies any needs at this time.

## 2024-11-11 NOTE — PLAN OF CARE
Problem: Depression  Goal: Treatment Goal: Demonstrate behavioral control of depressive symptoms, verbalize feelings of improved mood/affect, and adopt new coping skills prior to discharge  Outcome: Progressing  Goal: Verbalize thoughts and feelings  Description: Interventions:  - Assess and re-assess patient's level of risk   - Engage patient in 1:1 interactions, daily, for a minimum of 15 minutes   - Encourage patient to express feelings, fears, frustrations, hopes   Outcome: Progressing  Goal: Refrain from harming self  Description: Interventions:  - Monitor patient closely, per order   - Supervise medication ingestion, monitor effects and side effects   Outcome: Progressing  Goal: Refrain from isolation  Description: Interventions:  - Develop a trusting relationship   - Encourage socialization   Outcome: Progressing     Problem: Anxiety  Goal: Anxiety is at manageable level  Description: Interventions:  - Assess and monitor patient's anxiety level.   - Monitor for signs and symptoms (heart palpitations, chest pain, shortness of breath, headaches, nausea, feeling jumpy, restlessness, irritable, apprehensive).   - Collaborate with interdisciplinary team and initiate plan and interventions as ordered.  - [place] patient to unit/surroundings  - Explain treatment plan  - Encourage participation in care  - Encourage verbalization of concerns/fears  - Identify coping mechanisms  - Assist in developing anxiety-reducing skills  - Administer/offer alternative therapies  - Limit or eliminate stimulants  Outcome: Progressing     Problem: DISCHARGE PLANNING - CARE MANAGEMENT  Goal: Discharge to post-acute care or home with appropriate resources  Description: INTERVENTIONS:  - Conduct assessment to determine patient/family and health care team treatment goals, and need for post-acute services based on payer coverage, community resources, and patient preferences, and barriers to discharge  - Address psychosocial, clinical,  and financial barriers to discharge as identified in assessment in conjunction with the patient/family and health care team  - Arrange appropriate level of post-acute services according to patient’s   needs and preference and payer coverage in collaboration with the physician and health care team  - Communicate with and update the patient/family, physician, and health care team regarding progress on the discharge plan  - Arrange appropriate transportation to post-acute venues  Outcome: Progressing     Problem: Knowledge Deficit  Goal: Patient/family/caregiver demonstrates understanding of disease process, treatment plan, medications, and discharge instructions  Description: Complete learning assessment and assess knowledge base.  Interventions:  - Provide teaching at level of understanding  - Provide teaching via preferred learning methods  Outcome: Progressing     Problem: SLEEP DISTURBANCE  Goal: Will exhibit normal sleeping pattern  Description: Interventions:  -  Assess the patients sleep pattern, noting recent changes  - Administer medication as ordered  - Decrease environmental stimuli, including noise, as appropriate during the night  - Encourage the patient to actively participate in unit groups and or exercise during the day to enhance ability to achieve adequate sleep at night  - Assess the patient, in the morning, encouraging a description of sleep experience  Outcome: Progressing     Problem: Ineffective Coping  Goal: Participates in unit activities  Description: Interventions:  - Provide therapeutic environment   - Provide required programming   - Redirect inappropriate behaviors   Outcome: Not Progressing

## 2024-11-11 NOTE — TREATMENT PLAN
TREATMENT PLAN REVIEW - Behavioral Health Franco Roberson 39 y.o. 1985 male MRN: [id number]    [facility] 3B BEHAVIORAL [facility] Room / Bed: [facility] 347/[facility] 347-02 Encounter: [id number]          Admit Date/Time:  5/14/2024  4:45 PM    Treatment Team:   MD Isaac Heard RN Jennifer King Stephanie [place] Rebecca Sussman    Diagnosis: Principal Problem:    MDD (major depressive disorder), recurrent severe, without psychosis (HCC)  Active Problems:    Unspecified depressive disorder (HCC)    Medical clearance for psychiatric admission    Hyperlipidemia    Vitamin D deficiency    B12 deficiency    Hypothyroidism    Autism spectrum disorder      Patient Strengths/Assets: negotiates basic needs, patient is on a voluntary commitment    Patient Barriers/Limitations: impaired cognition, poor insight    Short Term Goals: decrease in depressive symptoms, mood stabilization    Long Term Goals: improvement in depression, improved insight    Progress Towards Goals: starting psychiatric medications as prescribed, progressing slowly, participates in milieu therapy, placement pending    Recommended Treatment: medication management, patient medication education, group therapy, milieu therapy, continued Behavioral Health psychiatric evaluation/assessment process    Treatment Frequency: daily medication monitoring, group and milieu therapy daily, monitoring through interdisciplinary rounds, monitoring through weekly patient care conferences    Expected Discharge Date:  21 days    Discharge Plan: placement in group home    Treatment Plan Created/Updated By: Dustin Mcallister MD

## 2024-11-11 NOTE — ASSESSMENT & PLAN NOTE
"No medications changes at this time, optimize as indicated;  Given patient's length of hospitalization and the fact that he is pending placement and has not had labs drawn in 2 months, will order screening labs to assess CMP, CBC, as well as TSH, vitamin D and B12 levels given that he is receiving treatment for supplementation for these.    F/u tomorrow AM labs    Abilify 5 mg daily as mood adjunct   Zoloft 150 mg daily for depression and anxiety  Continue to encourage participation in group therapy, milieu therapy and occupational therapy.  Continue to assess for side effects of medications.  Continue collaboration with PATRICIA for medical co-morbidities as indicated.  Continue discussion with CM/SW to assist with obtaining collateral, disposition planning, and the implementation of patient-centered individualized plan of care.  Continue frequent safety checks and vitals per unit protocol.    Risks, benefits and possible side effects of Medications: Risks, benefits, and possible side effects of medications have previously been explained. No new medications at this time.      Legal status: 201    Disposition: to be determined, pending SOAR application for potential group home placement. OT Cognitive Evaluation completed: \"Pt would benefit from discharge to a supportive environment that can provide checks for safety and compliance with IADLs. \"      No associated orders from this encounter found during lookback period of 72 hours.  "

## 2024-11-12 LAB
25(OH)D3 SERPL-MCNC: 25.2 NG/ML (ref 30–100)
ALBUMIN SERPL BCG-MCNC: 4.4 G/DL (ref 3.5–5)
ALP SERPL-CCNC: 73 U/L (ref 34–104)
ALT SERPL W P-5'-P-CCNC: 32 U/L (ref 7–52)
ANION GAP SERPL CALCULATED.3IONS-SCNC: 9 MMOL/L (ref 4–13)
AST SERPL W P-5'-P-CCNC: 23 U/L (ref 13–39)
BASOPHILS # BLD AUTO: 0.03 THOUSANDS/ΜL (ref 0–0.1)
BASOPHILS NFR BLD AUTO: 0 % (ref 0–1)
BILIRUB SERPL-MCNC: 0.54 MG/DL (ref 0.2–1)
BUN SERPL-MCNC: 17 MG/DL (ref 5–25)
CALCIUM SERPL-MCNC: 9.4 MG/DL (ref 8.4–10.2)
CHLORIDE SERPL-SCNC: 100 MMOL/L (ref 96–108)
CO2 SERPL-SCNC: 30 MMOL/L (ref 21–32)
CREAT SERPL-MCNC: 0.99 MG/DL (ref 0.6–1.3)
EOSINOPHIL # BLD AUTO: 0.11 THOUSAND/ΜL (ref 0–0.61)
EOSINOPHIL NFR BLD AUTO: 2 % (ref 0–6)
ERYTHROCYTE [DISTWIDTH] IN BLOOD BY AUTOMATED COUNT: 12.8 % (ref 11.6–15.1)
GFR SERPL CREATININE-BSD FRML MDRD: 95 ML/MIN/1.73SQ M
GLUCOSE P FAST SERPL-MCNC: 85 MG/DL (ref 65–99)
GLUCOSE SERPL-MCNC: 85 MG/DL (ref 65–140)
HCT VFR BLD AUTO: 46.2 % (ref 36.5–49.3)
HGB BLD-MCNC: 15.9 G/DL (ref 12–17)
IMM GRANULOCYTES # BLD AUTO: 0.01 THOUSAND/UL (ref 0–0.2)
IMM GRANULOCYTES NFR BLD AUTO: 0 % (ref 0–2)
LYMPHOCYTES # BLD AUTO: 1.98 THOUSANDS/ΜL (ref 0.6–4.47)
LYMPHOCYTES NFR BLD AUTO: 29 % (ref 14–44)
MCH RBC QN AUTO: 31.4 PG (ref 26.8–34.3)
MCHC RBC AUTO-ENTMCNC: 34.4 G/DL (ref 31.4–37.4)
MCV RBC AUTO: 91 FL (ref 82–98)
MONOCYTES # BLD AUTO: 0.47 THOUSAND/ΜL (ref 0.17–1.22)
MONOCYTES NFR BLD AUTO: 7 % (ref 4–12)
NEUTROPHILS # BLD AUTO: 4.15 THOUSANDS/ΜL (ref 1.85–7.62)
NEUTS SEG NFR BLD AUTO: 62 % (ref 43–75)
NRBC BLD AUTO-RTO: 0 /100 WBCS
PLATELET # BLD AUTO: 238 THOUSANDS/UL (ref 149–390)
PMV BLD AUTO: 8.7 FL (ref 8.9–12.7)
POTASSIUM SERPL-SCNC: 4 MMOL/L (ref 3.5–5.3)
PROT SERPL-MCNC: 8.1 G/DL (ref 6.4–8.4)
RBC # BLD AUTO: 5.06 MILLION/UL (ref 3.88–5.62)
SODIUM SERPL-SCNC: 139 MMOL/L (ref 135–147)
TSH SERPL DL<=0.05 MIU/L-ACNC: 2.53 UIU/ML (ref 0.45–4.5)
VIT B12 SERPL-MCNC: 761 PG/ML (ref 180–914)
WBC # BLD AUTO: 6.75 THOUSAND/UL (ref 4.31–10.16)

## 2024-11-12 PROCEDURE — 99232 SBSQ HOSP IP/OBS MODERATE 35: CPT | Performed by: PSYCHIATRY & NEUROLOGY

## 2024-11-12 PROCEDURE — 85025 COMPLETE CBC W/AUTO DIFF WBC: CPT | Performed by: PSYCHIATRY & NEUROLOGY

## 2024-11-12 PROCEDURE — 82306 VITAMIN D 25 HYDROXY: CPT | Performed by: PSYCHIATRY & NEUROLOGY

## 2024-11-12 PROCEDURE — 80053 COMPREHEN METABOLIC PANEL: CPT | Performed by: PSYCHIATRY & NEUROLOGY

## 2024-11-12 PROCEDURE — 82607 VITAMIN B-12: CPT | Performed by: PSYCHIATRY & NEUROLOGY

## 2024-11-12 PROCEDURE — 84443 ASSAY THYROID STIM HORMONE: CPT | Performed by: PSYCHIATRY & NEUROLOGY

## 2024-11-12 RX ADMIN — FAMOTIDINE 20 MG: 20 TABLET ORAL at 08:23

## 2024-11-12 RX ADMIN — ATORVASTATIN CALCIUM 10 MG: 10 TABLET, FILM COATED ORAL at 17:33

## 2024-11-12 RX ADMIN — CHOLECALCIFEROL TAB 25 MCG (1000 UNIT) 2000 UNITS: 25 TAB at 08:23

## 2024-11-12 RX ADMIN — FAMOTIDINE 20 MG: 20 TABLET ORAL at 17:33

## 2024-11-12 RX ADMIN — ARIPIPRAZOLE 5 MG: 5 TABLET ORAL at 08:22

## 2024-11-12 RX ADMIN — SERTRALINE HYDROCHLORIDE 150 MG: 100 TABLET ORAL at 08:23

## 2024-11-12 RX ADMIN — CYANOCOBALAMIN TAB 1000 MCG 1000 MCG: 1000 TAB at 08:23

## 2024-11-12 RX ADMIN — LEVOTHYROXINE SODIUM 37.5 MCG: 125 TABLET ORAL at 05:56

## 2024-11-12 NOTE — NURSING NOTE
Pt is calm and cooperative upon approach. Visible in the milieu walking, pt mainly keeps to themself. Denies SI, HI, AH, VH, and pain. Compliant with snack. No HS medication, pt is aware of resources/ PRN medication. Denies unmet needs at this time.

## 2024-11-12 NOTE — ASSESSMENT & PLAN NOTE
"No medications changes at this time, optimize as indicated;  Labs all grossly within normal limits.  Vitamin D was noted to be low and so will increase to 2500 units. Pt cont to be pending group home placement.     Abilify 5 mg daily as mood adjunct   Zoloft 150 mg daily for depression and anxiety  Continue to encourage participation in group therapy, milieu therapy and occupational therapy.  Continue to assess for side effects of medications.  Continue collaboration with SLIM for medical co-morbidities as indicated.  Continue discussion with CM/SW to assist with obtaining collateral, disposition planning, and the implementation of patient-centered individualized plan of care.  Continue frequent safety checks and vitals per unit protocol.    Risks, benefits and possible side effects of Medications: Risks, benefits, and possible side effects of medications have previously been explained. No new medications at this time.      Legal status: 201    Disposition: to be determined, pending SOAR application for potential group home placement. OT Cognitive Evaluation completed: \"Pt would benefit from discharge to a supportive environment that can provide checks for safety and compliance with IADLs. \"      No associated orders from this encounter found during lookback period of 72 hours.  "

## 2024-11-12 NOTE — NURSING NOTE
Pt is very visible today. Calm and cooperative upon approach. Pt attending groups and watching TV. Medication and meal compliant. Denies SI, HI, AH, and VH. Denies any needs at this time.

## 2024-11-12 NOTE — PLAN OF CARE
Problem: Ineffective Coping  Goal: Participates in unit activities  Description: Interventions:  - Provide therapeutic environment   - Provide required programming   - Redirect inappropriate behaviors   Outcome: Progressing     Problem: Depression  Goal: Treatment Goal: Demonstrate behavioral control of depressive symptoms, verbalize feelings of improved mood/affect, and adopt new coping skills prior to discharge  Outcome: Progressing  Goal: Verbalize thoughts and feelings  Description: Interventions:  - Assess and re-assess patient's level of risk   - Engage patient in 1:1 interactions, daily, for a minimum of 15 minutes   - Encourage patient to express feelings, fears, frustrations, hopes   Outcome: Progressing  Goal: Refrain from harming self  Description: Interventions:  - Monitor patient closely, per order   - Supervise medication ingestion, monitor effects and side effects   Outcome: Progressing  Goal: Refrain from isolation  Description: Interventions:  - Develop a trusting relationship   - Encourage socialization   Outcome: Progressing     Problem: Anxiety  Goal: Anxiety is at manageable level  Description: Interventions:  - Assess and monitor patient's anxiety level.   - Monitor for signs and symptoms (heart palpitations, chest pain, shortness of breath, headaches, nausea, feeling jumpy, restlessness, irritable, apprehensive).   - Collaborate with interdisciplinary team and initiate plan and interventions as ordered.  - [place] patient to unit/surroundings  - Explain treatment plan  - Encourage participation in care  - Encourage verbalization of concerns/fears  - Identify coping mechanisms  - Assist in developing anxiety-reducing skills  - Administer/offer alternative therapies  - Limit or eliminate stimulants  Outcome: Progressing     Problem: Knowledge Deficit  Goal: Patient/family/caregiver demonstrates understanding of disease process, treatment plan, medications, and discharge  instructions  Description: Complete learning assessment and assess knowledge base.  Interventions:  - Provide teaching at level of understanding  - Provide teaching via preferred learning methods  Outcome: Progressing     Problem: SLEEP DISTURBANCE  Goal: Will exhibit normal sleeping pattern  Description: Interventions:  -  Assess the patients sleep pattern, noting recent changes  - Administer medication as ordered  - Decrease environmental stimuli, including noise, as appropriate during the night  - Encourage the patient to actively participate in unit groups and or exercise during the day to enhance ability to achieve adequate sleep at night  - Assess the patient, in the morning, encouraging a description of sleep experience  Outcome: Progressing

## 2024-11-12 NOTE — PROGRESS NOTES
11/12/24 0844   Team Meeting   Meeting Type Daily Rounds   Team Members Present   Team Members Present Physician;Nurse;   Physician Team Member Ary   [facility]ing Team [facility] Team Member Suhas   Patient/Family Present   Patient Present No   Patient's Family Present No     Pt med/meal compliant. Visible on the unit. Pleasant, calm, cooperative.

## 2024-11-12 NOTE — PROGRESS NOTES
"Progress Note - Behavioral Health   Name: Franco Roberson 39 y.o. male I MRN: [id number]  Unit/Bed#: -02 I Date of Admission: 5/14/2024   Date of Service: 11/12/2024 I Hospital Day: 182     Assessment & Plan  MDD (major depressive disorder), recurrent severe, without psychosis (HCC)  No medications changes at this time, optimize as indicated;  Labs all grossly within normal limits.  Vitamin D was noted to be low and so will increase to 2500 units. Pt cont to be pending group home placement.     Abilify 5 mg daily as mood adjunct   Zoloft 150 mg daily for depression and anxiety  Continue to encourage participation in group therapy, milieu therapy and occupational therapy.  Continue to assess for side effects of medications.  Continue collaboration with PATRICIA for medical co-morbidities as indicated.  Continue discussion with CM/SW to assist with obtaining collateral, disposition planning, and the implementation of patient-centered individualized plan of care.  Continue frequent safety checks and vitals per unit protocol.    Risks, benefits and possible side effects of Medications: Risks, benefits, and possible side effects of medications have previously been explained. No new medications at this time.      Legal status: 201    Disposition: to be determined, pending SOAR application for potential group home placement. OT Cognitive Evaluation completed: \"Pt would benefit from discharge to a supportive environment that can provide checks for safety and compliance with IADLs. \"      No associated orders from this encounter found during lookback period of 72 hours.  Autism spectrum disorder  Continue supportive care    No associated orders from this encounter found during lookback period of 72 hours.        Plan     Recommended Treatment:    - Encourage early mobility and having a structured day  - Provide frequent re-orientation, and cognitive stimulation  - Ensure assistive devices are in proper working order " (eye-glasses, hearing aids)  - Encourage adequate hydration, nutrition and monitor bowel movements  - Maintain sleep-wake cycle: Uninterrupted sleep time; low-level lighting at night  - Fall precaution  - f/u SLIM recs regarding the medical problems   - Continue medication titration and treatment plan; adjust medication to optimize treatment response and as clinically indicated. .   - Observation: routine            - VS: as per unit protocol  - Diet: Regular diet  - Encourage group attendance and milieu therapy  - Dispo: To be determined     Scheduled medications:  Current Facility-Administered Medications   Medication Dose Route Frequency Provider Last Rate    acetaminophen  650 mg Oral Q6H PRN Basilio Brown MD      acetaminophen  650 mg Oral Q4H PRN Basilio Brown MD      acetaminophen  975 mg Oral Q6H PRN Basilio Brown MD      aluminum-magnesium hydroxide-simethicone  30 mL Oral Q4H PRN Basilio Brown MD      ARIPiprazole  5 mg Oral Daily Basilio Panda MD      Artificial Tears  1 drop Both Eyes Q3H PRN Basilio Brown MD      atorvastatin  10 mg Oral Daily With Dinner WALLY Baptiste      benztropine  1 mg Intramuscular Q4H PRN Max 6/day Basilio Brown MD      benztropine  1 mg Oral Q4H PRN Max 6/day Basilio Brown MD      Cholecalciferol  2,000 Units Oral Daily WALLY Baptiste      cyanocobalamin  1,000 mcg Oral Daily WALLY Baptiste      Diclofenac Sodium  2 g Topical 4x Daily PRN WALLY Baptiste      hydrOXYzine HCL  50 mg Oral Q6H PRN Max 4/day Basilio Brown MD      Or    diphenhydrAMINE  50 mg Intramuscular Q6H PRN Basilio Brown MD      diphenhydrAMINE-zinc acetate   Topical Daily PRN Raj Guerrero MD      famotidine  20 mg Oral BID WALLY Baptiste      hydrOXYzine HCL  100 mg Oral Q6H PRN Max 4/day Basilio Brown MD      Or    LORazepam  2 mg Intramuscular Q6H PRN Basilio Garcia  MD Karyn      hydrOXYzine HCL  25 mg Oral Q6H PRN Max 4/day Basilio Brown MD      levothyroxine  37.5 mcg Oral Early Morning WALLY Baptiste      melatonin  6 mg Oral HS PRN WALLY Gomez      methocarbamol  500 mg Oral Q6H PRN WALLY Baptiste      OLANZapine  5 mg Oral Q4H PRN Max 3/day Basilio Brown MD      Or    OLANZapine  2.5 mg Intramuscular Q4H PRN Max 3/day Basilio Brown MD      OLANZapine  5 mg Oral Q3H PRN Max 3/day Basilio Brown MD      Or    OLANZapine  5 mg Intramuscular Q3H PRN Max 3/day Basilio Brown MD      OLANZapine  2.5 mg Oral Q4H PRN Max 6/day Basilio Brown MD      polyethylene glycol  17 g Oral Daily PRN Basilio Brown MD      propranolol  10 mg Oral Q8H PRN Basilio Brown MD      senna-docusate sodium  1 tablet Oral Daily PRN Basilio Brown MD      sertraline  150 mg Oral Daily WALLY Gomez      traZODone  50 mg Oral HS PRN Basilio Brown MD        PRN:    acetaminophen    acetaminophen    acetaminophen    aluminum-magnesium hydroxide-simethicone    Artificial Tears    benztropine    benztropine    Diclofenac Sodium    hydrOXYzine HCL **OR** diphenhydrAMINE    diphenhydrAMINE-zinc acetate    hydrOXYzine HCL **OR** LORazepam    hydrOXYzine HCL    melatonin    methocarbamol    OLANZapine **OR** OLANZapine    OLANZapine **OR** OLANZapine    OLANZapine    polyethylene glycol    propranolol    senna-docusate sodium    traZODone       Subjective     Patient was visited on unit for continuing care; chart reviewed and discussed with multidisciplinary treatment team.  On approach, the patient was calm and cooperative.  Patient denies any acute complaints or concerns.  Continues to appear somewhat withdrawn and blunted affect.  Otherwise does not appear to be responding to internal stimuli or significantly internally preoccupied. No problem initiating and maintaining sleep.  Denied A/VH  "currently.  Denied SI/HI, intent or plan upon direct inquiry at this time.  Patient is pending placement to group home.    Patient continues to be visible in the milieu and interacts with staff and peers. No reports of aggression or self-injurious behavior on unit. No PRN medications used in the past 24 hours.    Patient accepted all offered medications and no adverse effects of medications noted or reported.    Objective    Current Mental Status Evaluation:  Appearance: casually dressed, appears consistent with stated age  Motor: no psychomotor disturbances, no gait abnormalities  Behavior: cooperative, interacts with this writer appropriately  Speech: normal rate, rhythm, and volume  Mood: \"ok\"  Affect: euthymic, normal range and intensity  Thought Process: concrete  Thought Content: no overt delusions or paranoia  Perception: denies auditory hallucinations, denies visual hallucinations  Risk Potential: denies suicidal ideation, plan, or intent. Denies homicidal ideation  Sensorium: Oriented to person, place, time, and situation  Cognition: cognitive ability appears intact but was not quantitatively tested  Consciousness: alert and awake  Attention: able to focus without difficulty  Insight: improved  Judgement: improved            Vital signs in last 24 hours:    Temp:  [97.1 °F (36.2 °C)] 97.1 °F (36.2 °C)  HR:  [71-72] 71  BP: (116-123)/(68-72) 116/72  Resp:  [16] 16  SpO2:  [98 %] 98 %  O2 Device: None (Room air)    Psychiatric Review of Systems:  Medication adverse effects: none  Sleep: unchanged  Appetite: unchanged  Behaviors over the past 24 hours: unchanged    Laboratory results:    I have personally reviewed all pertinent laboratory/tests results  Recent Results (from the past 48 hour(s))   CBC and differential    Collection Time: 11/12/24  6:02 AM   Result Value Ref Range    WBC 6.75 4.31 - 10.16 Thousand/uL    RBC 5.06 3.88 - 5.62 Million/uL    Hemoglobin 15.9 12.0 - 17.0 g/dL    Hematocrit 46.2 36.5 - " 49.3 %    MCV 91 82 - 98 fL    MCH 31.4 26.8 - 34.3 pg    MCHC 34.4 31.4 - 37.4 g/dL    RDW 12.8 11.6 - 15.1 %    MPV 8.7 (L) 8.9 - 12.7 fL    Platelets 238 149 - 390 Thousands/uL    nRBC 0 /100 WBCs    Segmented % 62 43 - 75 %    Immature Grans % 0 0 - 2 %    Lymphocytes % 29 14 - 44 %    Monocytes % 7 4 - 12 %    Eosinophils Relative 2 0 - 6 %    Basophils Relative 0 0 - 1 %    Absolute Neutrophils 4.15 1.85 - 7.62 Thousands/µL    Absolute Immature Grans 0.01 0.00 - 0.20 Thousand/uL    Absolute Lymphocytes 1.98 0.60 - 4.47 Thousands/µL    Absolute Monocytes 0.47 0.17 - 1.22 Thousand/µL    Eosinophils Absolute 0.11 0.00 - 0.61 Thousand/µL    Basophils Absolute 0.03 0.00 - 0.10 Thousands/µL   Comprehensive metabolic panel    Collection Time: 11/12/24  6:02 AM   Result Value Ref Range    Sodium 139 135 - 147 mmol/L    Potassium 4.0 3.5 - 5.3 mmol/L    Chloride 100 96 - 108 mmol/L    CO2 30 21 - 32 mmol/L    ANION GAP 9 4 - 13 mmol/L    BUN 17 5 - 25 mg/dL    Creatinine 0.99 0.60 - 1.30 mg/dL    Glucose 85 65 - 140 mg/dL    Glucose, Fasting 85 65 - 99 mg/dL    Calcium 9.4 8.4 - 10.2 mg/dL    AST 23 13 - 39 U/L    ALT 32 7 - 52 U/L    Alkaline Phosphatase 73 34 - 104 U/L    Total Protein 8.1 6.4 - 8.4 g/dL    Albumin 4.4 3.5 - 5.0 g/dL    Total Bilirubin 0.54 0.20 - 1.00 mg/dL    eGFR 95 ml/min/1.73sq m   TSH, 3rd generation with Free T4 reflex    Collection Time: 11/12/24  6:02 AM   Result Value Ref Range    TSH 3RD GENERATON 2.532 0.450 - 4.500 uIU/mL   Vitamin D 25 hydroxy    Collection Time: 11/12/24  6:02 AM   Result Value Ref Range    Vit D, 25-Hydroxy 25.2 (L) 30.0 - 100.0 ng/mL   Vitamin B12    Collection Time: 11/12/24  6:02 AM   Result Value Ref Range    Vitamin B-12 761 180 - 914 pg/mL          Progress Toward Goals & Illness Status:   improving, placement pending    Patient is not at goal. They are not yet ready for discharge. The patient's condition currently requires active psychopharmacological  medication management, interdisciplinary coordination with case management, and the utilization of adjunctive milieu and group therapy to augment psychopharmacological efficacy. The patient's risk of morbidity, and progression or decompensation of psychiatric disease, is higher without this current treatment.     Next of Kin  Extended Emergency Contact Information  Primary Emergency Contact: Lois Roberson  Address: [place]           [id number] [place] [address] [place]  Home Phone: [phone]  Relation: Mother    Counseling / Coordination of Care  Patient's progress discussed with staff in treatment team meeting.  Medications, treatment progress and treatment plan reviewed with patient.  Medication education provided to patient.  Educated on importance of medication and treatment compliance.  Supportive therapy provided to patient.  Cognitive techniques utilized during the session.  Reassurance and supportive therapy provided.  Reoriented to reality and reassured.  Encouraged participation in milieu and group therapy on the unit.  Crisis/safety plan discussed with patient.       Dustin Mcallister MD  Attending Psychiatrist   [facility]      This note has been constructed using a voice recognition system. There may be translation, syntax, or grammatical errors. If you have any questions, please contact the dictating provider.

## 2024-11-12 NOTE — CASE MANAGEMENT
Pt received his renewed 's license in the mail today.   Diana from [facility] also called and will be having  Angel from the [facility] office contact this writer and patient on 11/13/24 to assist with moving ahead with SSI/SSD SOAR application. She apologized for the misunderstanding that occurred at the [facility] office re: pt's state of residence and photo ID. Writer updated patient and will await call from Mrs Ortiz on 11/13/24. Pt pleased with update and thanked writer.

## 2024-11-13 PROCEDURE — 99232 SBSQ HOSP IP/OBS MODERATE 35: CPT | Performed by: PSYCHIATRY & NEUROLOGY

## 2024-11-13 RX ADMIN — FAMOTIDINE 20 MG: 20 TABLET ORAL at 17:30

## 2024-11-13 RX ADMIN — Medication 2500 UNITS: at 08:17

## 2024-11-13 RX ADMIN — FAMOTIDINE 20 MG: 20 TABLET ORAL at 08:17

## 2024-11-13 RX ADMIN — ARIPIPRAZOLE 5 MG: 5 TABLET ORAL at 08:17

## 2024-11-13 RX ADMIN — ATORVASTATIN CALCIUM 10 MG: 10 TABLET, FILM COATED ORAL at 17:30

## 2024-11-13 RX ADMIN — LEVOTHYROXINE SODIUM 37.5 MCG: 125 TABLET ORAL at 06:48

## 2024-11-13 RX ADMIN — SERTRALINE HYDROCHLORIDE 150 MG: 100 TABLET ORAL at 08:17

## 2024-11-13 RX ADMIN — CYANOCOBALAMIN TAB 1000 MCG 1000 MCG: 1000 TAB at 08:17

## 2024-11-13 NOTE — PROGRESS NOTES
11/13/24 0838   Team Meeting   Meeting Type Daily Rounds   Team Members Present   Team Members Present Physician;;Nurse   Physician Team Member Ary   Nursing Team Member [facility] Management Team Member Noa   Patient/Family Present   Patient Present No   Patient's Family Present No     Pt is medication and meal compliant. Pt is calm and cooperative. Pt received his renewed license. Pt denies SI/HI/AVH. Pt's discharge is pending placement.

## 2024-11-13 NOTE — PLAN OF CARE
Problem: Depression  Goal: Treatment Goal: Demonstrate behavioral control of depressive symptoms, verbalize feelings of improved mood/affect, and adopt new coping skills prior to discharge  Outcome: Progressing  Goal: Verbalize thoughts and feelings  Description: Interventions:  - Assess and re-assess patient's level of risk   - Engage patient in 1:1 interactions, daily, for a minimum of 15 minutes   - Encourage patient to express feelings, fears, frustrations, hopes   Outcome: Progressing  Goal: Refrain from harming self  Description: Interventions:  - Monitor patient closely, per order   - Supervise medication ingestion, monitor effects and side effects   Outcome: Progressing  Goal: Refrain from isolation  Description: Interventions:  - Develop a trusting relationship   - Encourage socialization   Outcome: Progressing     Problem: Anxiety  Goal: Anxiety is at manageable level  Description: Interventions:  - Assess and monitor patient's anxiety level.   - Monitor for signs and symptoms (heart palpitations, chest pain, shortness of breath, headaches, nausea, feeling jumpy, restlessness, irritable, apprehensive).   - Collaborate with interdisciplinary team and initiate plan and interventions as ordered.  - [place] patient to unit/surroundings  - Explain treatment plan  - Encourage participation in care  - Encourage verbalization of concerns/fears  - Identify coping mechanisms  - Assist in developing anxiety-reducing skills  - Administer/offer alternative therapies  - Limit or eliminate stimulants  Outcome: Progressing     Problem: DISCHARGE PLANNING - CARE MANAGEMENT  Goal: Discharge to post-acute care or home with appropriate resources  Description: INTERVENTIONS:  - Conduct assessment to determine patient/family and health care team treatment goals, and need for post-acute services based on payer coverage, community resources, and patient preferences, and barriers to discharge  - Address psychosocial, clinical,  and financial barriers to discharge as identified in assessment in conjunction with the patient/family and health care team  - Arrange appropriate level of post-acute services according to patient’s   needs and preference and payer coverage in collaboration with the physician and health care team  - Communicate with and update the patient/family, physician, and health care team regarding progress on the discharge plan  - Arrange appropriate transportation to post-acute venues  Outcome: Progressing     Problem: Knowledge Deficit  Goal: Patient/family/caregiver demonstrates understanding of disease process, treatment plan, medications, and discharge instructions  Description: Complete learning assessment and assess knowledge base.  Interventions:  - Provide teaching at level of understanding  - Provide teaching via preferred learning methods  Outcome: Progressing     Problem: SLEEP DISTURBANCE  Goal: Will exhibit normal sleeping pattern  Description: Interventions:  -  Assess the patients sleep pattern, noting recent changes  - Administer medication as ordered  - Decrease environmental stimuli, including noise, as appropriate during the night  - Encourage the patient to actively participate in unit groups and or exercise during the day to enhance ability to achieve adequate sleep at night  - Assess the patient, in the morning, encouraging a description of sleep experience  Outcome: Progressing

## 2024-11-13 NOTE — NURSING NOTE
Pt pleasant and brightens upon approach. Visible on the unit periodically, socializes with select peers and staff. Denies SI/HI/AH/VH, depression, or anxiety. Pt feeling more hopeful about his discharge plan. Medication and meal compliant. No current unmet needs.

## 2024-11-13 NOTE — PROGRESS NOTES
"Progress Note - Behavioral Health   Name: Franco Roberson 39 y.o. male I MRN: [id number]  Unit/Bed#: -02 I Date of Admission: 5/14/2024   Date of Service: 11/13/2024 I Hospital Day: 183     Assessment & Plan  MDD (major depressive disorder), recurrent severe, without psychosis (HCC)  No medications changes at this time, optimize as indicated;  Patient continues to be pending group home placement.     Abilify 5 mg daily as mood adjunct   Zoloft 150 mg daily for depression and anxiety  Continue to encourage participation in group therapy, milieu therapy and occupational therapy.  Continue to assess for side effects of medications.  Continue collaboration with [facility] for medical co-morbidities as indicated.  Continue discussion with CM/SW to assist with obtaining collateral, disposition planning, and the implementation of patient-centered individualized plan of care.  Continue frequent safety checks and vitals per unit protocol.    Risks, benefits and possible side effects of Medications: Risks, benefits, and possible side effects of medications have previously been explained. No new medications at this time.      Legal status: 201    Disposition: to be determined, pending SOAR application for potential group home placement. OT Cognitive Evaluation completed: \"Pt would benefit from discharge to a supportive environment that can provide checks for safety and compliance with IADLs. \"   Although patient's mood has stabilized, they are currently awaiting group home placement.  Their ability to recognize safety hazards take medications and participate in IADLs are significantly impaired by their cognitive deficits compounded by their chronic mental health needs and would be at risk for significant decompensation without the structure and support of a group home setting.      No associated orders from this encounter found during lookback period of 72 hours.  Autism spectrum disorder  Continue supportive care    No associated " orders from this encounter found during lookback period of 72 hours.        Plan     Recommended Treatment:    - Encourage early mobility and having a structured day  - Provide frequent re-orientation, and cognitive stimulation  - Ensure assistive devices are in proper working order (eye-glasses, hearing aids)  - Encourage adequate hydration, nutrition and monitor bowel movements  - Maintain sleep-wake cycle: Uninterrupted sleep time; low-level lighting at night  - Fall precaution  - f/u [facility] recs regarding the medical problems   - Continue medication titration and treatment plan; adjust medication to optimize treatment response and as clinically indicated. .   - Observation: routine            - VS: as per unit protocol  - Diet: Regular diet  - Encourage group attendance and milieu therapy  - Dispo: To be determined     Scheduled medications:  Current Facility-Administered Medications   Medication Dose Route Frequency Provider Last Rate    acetaminophen  650 mg Oral Q6H PRN Basilio Brown MD      acetaminophen  650 mg Oral Q4H PRN Basilio Brown MD      acetaminophen  975 mg Oral Q6H PRN Basilio Brown MD      aluminum-magnesium hydroxide-simethicone  30 mL Oral Q4H PRN Basilio Brown MD      ARIPiprazole  5 mg Oral Daily Basilio Panda MD      Artificial Tears  1 drop Both Eyes Q3H PRN Basilio Brown MD      atorvastatin  10 mg Oral Daily With Dinner WALLY Baptiste      benztropine  1 mg Intramuscular Q4H PRN Max 6/day Basilio Brown MD      benztropine  1 mg Oral Q4H PRN Max 6/day Basilio Brown MD      Cholecalciferol  2,500 Units Oral Daily Dustin Mcallister MD      cyanocobalamin  1,000 mcg Oral Daily WALLY Baptiste      Diclofenac Sodium  2 g Topical 4x Daily PRN WALLY Baptiste      hydrOXYzine HCL  50 mg Oral Q6H PRN Max 4/day Basilio Brown MD      Or    diphenhydrAMINE  50 mg Intramuscular Q6H PRN Basilio Brown MD       diphenhydrAMINE-zinc acetate   Topical Daily PRN Raj Guerrero MD      famotidine  20 mg Oral BID WALLY Baptiste      hydrOXYzine HCL  100 mg Oral Q6H PRN Max 4/day Basilio Brown MD      Or    LORazepam  2 mg Intramuscular Q6H PRN Basilio Brown MD      hydrOXYzine HCL  25 mg Oral Q6H PRN Max 4/day Basilio Brown MD      levothyroxine  37.5 mcg Oral Early Morning WALLY Baptiste      melatonin  6 mg Oral HS PRN WALLY Gomez      methocarbamol  500 mg Oral Q6H PRN WALLY Baptiste      OLANZapine  5 mg Oral Q4H PRN Max 3/day Basilio Brown MD      Or    OLANZapine  2.5 mg Intramuscular Q4H PRN Max 3/day Basilio Brown MD      OLANZapine  5 mg Oral Q3H PRN Max 3/day Basilio Brown MD      Or    OLANZapine  5 mg Intramuscular Q3H PRN Max 3/day Basilio Brown MD      OLANZapine  2.5 mg Oral Q4H PRN Max 6/day Basilio Brown MD      polyethylene glycol  17 g Oral Daily PRN Basilio Brown MD      propranolol  10 mg Oral Q8H PRN Basilio Brown MD      senna-docusate sodium  1 tablet Oral Daily PRN Basilio Brown MD      sertraline  150 mg Oral Daily WALLY Gomez      traZODone  50 mg Oral HS PRN Basilio Brown MD        PRN:    acetaminophen    acetaminophen    acetaminophen    aluminum-magnesium hydroxide-simethicone    Artificial Tears    benztropine    benztropine    Diclofenac Sodium    hydrOXYzine HCL **OR** diphenhydrAMINE    diphenhydrAMINE-zinc acetate    hydrOXYzine HCL **OR** LORazepam    hydrOXYzine HCL    melatonin    methocarbamol    OLANZapine **OR** OLANZapine    OLANZapine **OR** OLANZapine    OLANZapine    polyethylene glycol    propranolol    senna-docusate sodium    traZODone       Subjective     Patient was visited on unit for continuing care; chart reviewed and discussed with multidisciplinary treatment team.  On approach, the patient was calm and cooperative. Denied any  "changes in mood, appetite, and energy level.  Denies any physical complaints or concerns.  Patient states that he enjoyed going to music group yesterday and is looking forward to going to it again.  Patient appears affectively brighter upon discussion today than he has when I have seen him previously.  Appears to have better grooming today. No problem initiating and maintaining sleep.  Denied A/VH currently.  Denied SI/HI, intent or plan upon direct inquiry at this time.    Patient continues to be visible in the milieu and interacts with staff and peers. No reports of aggression or self-injurious behavior on unit. No PRN medications used in the past 24 hours.    Patient accepted all offered medications and no adverse effects of medications noted or reported.    Objective    Current Mental Status Evaluation:  Appearance: casually dressed, appears consistent with stated age  Motor: no psychomotor disturbances  Behavior: cooperative, interacts with this writer appropriately  Speech: normal rate, rhythm, and volume  Mood: \"I'm ok\"  Affect: euthymic overall, blunted  Thought Process: concrete  Thought Content: denies auditory hallucinations, denies visual hallucinations, denies delusions  Risk Potential: denies suicidal ideation, plan, or intent. Denies homicidal ideation  Sensorium: Oriented to person, place, time, and situation  Cognition: cognitive ability appears intact but was not quantitatively tested  Consciousness: alert and awake  Attention: able to focus without difficulty  Insight: limited  Judgement: limited            Vital signs in last 24 hours:    Temp:  [96.5 °F (35.8 °C)] 96.5 °F (35.8 °C)  HR:  [70-76] 70  BP: (119-152)/(83) 119/83  Resp:  [16] 16  SpO2:  [94 %-99 %] 99 %  O2 Device: None (Room air)    Psychiatric Review of Systems:  Medication adverse effects: none  Sleep: unchanged  Appetite: unchanged  Behaviors over the past 24 hours: unchanged    Laboratory results:    I have personally reviewed " all pertinent laboratory/tests results  Recent Results (from the past 48 hours)   CBC and differential    Collection Time: 11/12/24  6:02 AM   Result Value Ref Range    WBC 6.75 4.31 - 10.16 Thousand/uL    RBC 5.06 3.88 - 5.62 Million/uL    Hemoglobin 15.9 12.0 - 17.0 g/dL    Hematocrit 46.2 36.5 - 49.3 %    MCV 91 82 - 98 fL    MCH 31.4 26.8 - 34.3 pg    MCHC 34.4 31.4 - 37.4 g/dL    RDW 12.8 11.6 - 15.1 %    MPV 8.7 (L) 8.9 - 12.7 fL    Platelets 238 149 - 390 Thousands/uL    nRBC 0 /100 WBCs    Segmented % 62 43 - 75 %    Immature Grans % 0 0 - 2 %    Lymphocytes % 29 14 - 44 %    Monocytes % 7 4 - 12 %    Eosinophils Relative 2 0 - 6 %    Basophils Relative 0 0 - 1 %    Absolute Neutrophils 4.15 1.85 - 7.62 Thousands/µL    Absolute Immature Grans 0.01 0.00 - 0.20 Thousand/uL    Absolute Lymphocytes 1.98 0.60 - 4.47 Thousands/µL    Absolute Monocytes 0.47 0.17 - 1.22 Thousand/µL    Eosinophils Absolute 0.11 0.00 - 0.61 Thousand/µL    Basophils Absolute 0.03 0.00 - 0.10 Thousands/µL   Comprehensive metabolic panel    Collection Time: 11/12/24  6:02 AM   Result Value Ref Range    Sodium 139 135 - 147 mmol/L    Potassium 4.0 3.5 - 5.3 mmol/L    Chloride 100 96 - 108 mmol/L    CO2 30 21 - 32 mmol/L    ANION GAP 9 4 - 13 mmol/L    BUN 17 5 - 25 mg/dL    Creatinine 0.99 0.60 - 1.30 mg/dL    Glucose 85 65 - 140 mg/dL    Glucose, Fasting 85 65 - 99 mg/dL    Calcium 9.4 8.4 - 10.2 mg/dL    AST 23 13 - 39 U/L    ALT 32 7 - 52 U/L    Alkaline Phosphatase 73 34 - 104 U/L    Total Protein 8.1 6.4 - 8.4 g/dL    Albumin 4.4 3.5 - 5.0 g/dL    Total Bilirubin 0.54 0.20 - 1.00 mg/dL    eGFR 95 ml/min/1.73sq m   TSH, 3rd generation with Free T4 reflex    Collection Time: 11/12/24  6:02 AM   Result Value Ref Range    TSH 3RD GENERATON 2.532 0.450 - 4.500 uIU/mL   Vitamin D 25 hydroxy    Collection Time: 11/12/24  6:02 AM   Result Value Ref Range    Vit D, 25-Hydroxy 25.2 (L) 30.0 - 100.0 ng/mL   Vitamin B12    Collection Time:  11/12/24  6:02 AM   Result Value Ref Range    Vitamin B-12 761 180 - 914 pg/mL          Progress Toward Goals & Illness Status:   attends groups, participates in milieu therapy, placement pending    Patient is not at goal. They are not yet ready for discharge. The patient's condition currently requires active psychopharmacological medication management, interdisciplinary coordination with case management, and the utilization of adjunctive milieu and group therapy to augment psychopharmacological efficacy. The patient's risk of morbidity, and progression or decompensation of psychiatric disease, is higher without this current treatment.     Next of Kin  Extended Emergency Contact Information  Primary Emergency Contact: Lois Roberson  Address: [address]           [place] [id number] [place] [address] [place]  Home Phone: [phone]  Relation: Mother    Counseling / Coordination of Care  Patient's progress discussed with staff in treatment team meeting.  Medications, treatment progress and treatment plan reviewed with patient.  Medication education provided to patient.  Educated on importance of medication and treatment compliance.  Supportive therapy provided to patient.  Cognitive techniques utilized during the session.  Reassurance and supportive therapy provided.  Reoriented to reality and reassured.  Encouraged participation in milieu and group therapy on the unit.  Crisis/safety plan discussed with patient.       Dustin Mcallister MD  Attending Psychiatrist   [facility]      This note has been constructed using a voice recognition system. There may be translation, syntax, or grammatical errors. If you have any questions, please contact the dictating provider.

## 2024-11-13 NOTE — ASSESSMENT & PLAN NOTE
"No medications changes at this time, optimize as indicated;  Patient continues to be pending group home placement.     Abilify 5 mg daily as mood adjunct   Zoloft 150 mg daily for depression and anxiety  Continue to encourage participation in group therapy, milieu therapy and occupational therapy.  Continue to assess for side effects of medications.  Continue collaboration with [facility] for medical co-morbidities as indicated.  Continue discussion with CM/SW to assist with obtaining collateral, disposition planning, and the implementation of patient-centered individualized plan of care.  Continue frequent safety checks and vitals per unit protocol.    Risks, benefits and possible side effects of Medications: Risks, benefits, and possible side effects of medications have previously been explained. No new medications at this time.      Legal status: 201    Disposition: to be determined, pending SOAR application for potential group home placement. OT Cognitive Evaluation completed: \"Pt would benefit from discharge to a supportive environment that can provide checks for safety and compliance with IADLs. \"   Although patient's mood has stabilized, they are currently awaiting group home placement.  Their ability to recognize safety hazards take medications and participate in IADLs are significantly impaired by their cognitive deficits compounded by their chronic mental health needs and would be at risk for significant decompensation without the structure and support of a group home setting.      No associated orders from this encounter found during lookback period of 72 hours.  "

## 2024-11-13 NOTE — NURSING NOTE
Patient is calm and cooperative, pleasant, visible on unit, isolative to self. Medication and meal compliant. Patient appears to be in good sprites. Patient reports being excited and looking forward to having a phone from [employer] today. Patient denies SI/HI/AVH and pain. Q 15 min safety checks maintained.

## 2024-11-13 NOTE — NURSING NOTE
Pt visible throughout evening, social and active. Pt participates in music group and appears brighter than yesterday. Pt denies SI/HI/AH/VH and unmet needs.

## 2024-11-14 PROCEDURE — 99232 SBSQ HOSP IP/OBS MODERATE 35: CPT | Performed by: PSYCHIATRY & NEUROLOGY

## 2024-11-14 RX ADMIN — Medication 2500 UNITS: at 08:38

## 2024-11-14 RX ADMIN — LEVOTHYROXINE SODIUM 37.5 MCG: 125 TABLET ORAL at 06:44

## 2024-11-14 RX ADMIN — FAMOTIDINE 20 MG: 20 TABLET ORAL at 08:39

## 2024-11-14 RX ADMIN — ATORVASTATIN CALCIUM 10 MG: 10 TABLET, FILM COATED ORAL at 17:41

## 2024-11-14 RX ADMIN — SERTRALINE HYDROCHLORIDE 150 MG: 100 TABLET ORAL at 08:39

## 2024-11-14 RX ADMIN — CYANOCOBALAMIN TAB 1000 MCG 1000 MCG: 1000 TAB at 08:39

## 2024-11-14 RX ADMIN — ARIPIPRAZOLE 5 MG: 5 TABLET ORAL at 08:38

## 2024-11-14 RX ADMIN — FAMOTIDINE 20 MG: 20 TABLET ORAL at 17:41

## 2024-11-14 NOTE — PLAN OF CARE
Problem: Ineffective Coping  Goal: Participates in unit activities  Description: Interventions:  - Provide therapeutic environment   - Provide required programming   - Redirect inappropriate behaviors   Outcome: Progressing     Problem: Depression  Goal: Treatment Goal: Demonstrate behavioral control of depressive symptoms, verbalize feelings of improved mood/affect, and adopt new coping skills prior to discharge  Outcome: Progressing  Goal: Verbalize thoughts and feelings  Description: Interventions:  - Assess and re-assess patient's level of risk   - Engage patient in 1:1 interactions, daily, for a minimum of 15 minutes   - Encourage patient to express feelings, fears, frustrations, hopes   Outcome: Progressing  Goal: Refrain from harming self  Description: Interventions:  - Monitor patient closely, per order   - Supervise medication ingestion, monitor effects and side effects   Outcome: Progressing  Goal: Refrain from isolation  Description: Interventions:  - Develop a trusting relationship   - Encourage socialization   Outcome: Progressing     Problem: Anxiety  Goal: Anxiety is at manageable level  Description: Interventions:  - Assess and monitor patient's anxiety level.   - Monitor for signs and symptoms (heart palpitations, chest pain, shortness of breath, headaches, nausea, feeling jumpy, restlessness, irritable, apprehensive).   - Collaborate with interdisciplinary team and initiate plan and interventions as ordered.  - [place] patient to unit/surroundings  - Explain treatment plan  - Encourage participation in care  - Encourage verbalization of concerns/fears  - Identify coping mechanisms  - Assist in developing anxiety-reducing skills  - Administer/offer alternative therapies  - Limit or eliminate stimulants  Outcome: Progressing     Problem: DISCHARGE PLANNING - CARE MANAGEMENT  Goal: Discharge to post-acute care or home with appropriate resources  Description: INTERVENTIONS:  - Conduct assessment to  determine patient/family and health care team treatment goals, and need for post-acute services based on payer coverage, community resources, and patient preferences, and barriers to discharge  - Address psychosocial, clinical, and financial barriers to discharge as identified in assessment in conjunction with the patient/family and health care team  - Arrange appropriate level of post-acute services according to patient’s   needs and preference and payer coverage in collaboration with the physician and health care team  - Communicate with and update the patient/family, physician, and health care team regarding progress on the discharge plan  - Arrange appropriate transportation to post-acute venues  Outcome: Progressing     Problem: Knowledge Deficit  Goal: Patient/family/caregiver demonstrates understanding of disease process, treatment plan, medications, and discharge instructions  Description: Complete learning assessment and assess knowledge base.  Interventions:  - Provide teaching at level of understanding  - Provide teaching via preferred learning methods  Outcome: Progressing     Problem: SLEEP DISTURBANCE  Goal: Will exhibit normal sleeping pattern  Description: Interventions:  -  Assess the patients sleep pattern, noting recent changes  - Administer medication as ordered  - Decrease environmental stimuli, including noise, as appropriate during the night  - Encourage the patient to actively participate in unit groups and or exercise during the day to enhance ability to achieve adequate sleep at night  - Assess the patient, in the morning, encouraging a description of sleep experience  Outcome: Progressing

## 2024-11-14 NOTE — NURSING NOTE
Patient is calm and cooperative, visible on unit, brightens upon approach. Denies SI/HI/AVH and pain. Denies any unmet needs at this time.

## 2024-11-14 NOTE — ASSESSMENT & PLAN NOTE
"No medications changes at this time, optimize as indicated;  Patient continues to be pending group home placement. SSA form filled out.    Abilify 5 mg daily as mood adjunct   Zoloft 150 mg daily for depression and anxiety  Continue to encourage participation in group therapy, milieu therapy and occupational therapy.  Continue to assess for side effects of medications.  Continue collaboration with [facility] for medical co-morbidities as indicated.  Continue discussion with CM/SW to assist with obtaining collateral, disposition planning, and the implementation of patient-centered individualized plan of care.  Continue frequent safety checks and vitals per unit protocol.    Risks, benefits and possible side effects of Medications: Risks, benefits, and possible side effects of medications have previously been explained. No new medications at this time.      Legal status: 201    Disposition: to be determined, pending SOAR application for potential group home placement. OT Cognitive Evaluation completed: \"Pt would benefit from discharge to a supportive environment that can provide checks for safety and compliance with IADLs. \"   Although patient's mood has stabilized, they are currently awaiting group home placement.  Their ability to recognize safety hazards take medications and participate in IADLs are significantly impaired by their cognitive deficits compounded by their chronic mental health needs and would be at risk for significant decompensation without the structure and support of a group home setting.      No associated orders from this encounter found during lookback period of 72 hours.  "

## 2024-11-14 NOTE — PROGRESS NOTES
11/14/24 0846   Team Meeting   Meeting Type Daily Rounds   Team Members Present   Team Members Present Physician;;Nurse   Physician Team Member Ary   Nursing Team Member [facility] Management Team Member Noa   Patient/Family Present   Patient Present No   Patient's Family Present No     Pt is medication and meal compliant. Pt is clam and pleasant upon approach. Pt denies SI/HI/AVH. Pt's discharge is pending placement. Pt is scheduled with  for SOAR application.

## 2024-11-14 NOTE — SOCIAL WORK
Cm checked in with Pt and updated Pt on the SSA process. Cm also informed Pt that her drivers license arrived to the hospital. Pt was happy about this and reported he is excited that things are starting to move.

## 2024-11-14 NOTE — PROGRESS NOTES
"Progress Note - Behavioral Health   Name: Franco Roberson 39 y.o. male I MRN: [id number]  Unit/Bed#: -02 I Date of Admission: 5/14/2024   Date of Service: 11/14/2024 I Hospital Day: 184     Assessment & Plan  MDD (major depressive disorder), recurrent severe, without psychosis (HCC)  No medications changes at this time, optimize as indicated;  Patient continues to be pending group home placement. SSA form filled out.    Abilify 5 mg daily as mood adjunct   Zoloft 150 mg daily for depression and anxiety  Continue to encourage participation in group therapy, milieu therapy and occupational therapy.  Continue to assess for side effects of medications.  Continue collaboration with [facility] for medical co-morbidities as indicated.  Continue discussion with CM/SW to assist with obtaining collateral, disposition planning, and the implementation of patient-centered individualized plan of care.  Continue frequent safety checks and vitals per unit protocol.    Risks, benefits and possible side effects of Medications: Risks, benefits, and possible side effects of medications have previously been explained. No new medications at this time.      Legal status: 201    Disposition: to be determined, pending SOAR application for potential group home placement. OT Cognitive Evaluation completed: \"Pt would benefit from discharge to a supportive environment that can provide checks for safety and compliance with IADLs. \"   Although patient's mood has stabilized, they are currently awaiting group home placement.  Their ability to recognize safety hazards take medications and participate in IADLs are significantly impaired by their cognitive deficits compounded by their chronic mental health needs and would be at risk for significant decompensation without the structure and support of a group home setting.      No associated orders from this encounter found during lookback period of 72 hours.  Autism spectrum disorder  Continue supportive " care    No associated orders from this encounter found during lookback period of 72 hours.        Plan     Recommended Treatment:    - Encourage early mobility and having a structured day  - Provide frequent re-orientation, and cognitive stimulation  - Ensure assistive devices are in proper working order (eye-glasses, hearing aids)  - Encourage adequate hydration, nutrition and monitor bowel movements  - Maintain sleep-wake cycle: Uninterrupted sleep time; low-level lighting at night  - Fall precaution  - f/u SLIM recs regarding the medical problems   - Continue medication titration and treatment plan; adjust medication to optimize treatment response and as clinically indicated. .   - Observation: routine            - VS: as per unit protocol  - Diet: Regular diet  - Encourage group attendance and milieu therapy  - Dispo: To be determined     Scheduled medications:  Current Facility-Administered Medications   Medication Dose Route Frequency Provider Last Rate    acetaminophen  650 mg Oral Q6H PRN Basilio Brown MD      acetaminophen  650 mg Oral Q4H PRN Basilio Brown MD      acetaminophen  975 mg Oral Q6H PRN Basilio Brown MD      aluminum-magnesium hydroxide-simethicone  30 mL Oral Q4H PRN Basilio Brown MD      ARIPiprazole  5 mg Oral Daily Basilio Panda MD      Artificial Tears  1 drop Both Eyes Q3H PRN Basilio Brown MD      atorvastatin  10 mg Oral Daily With Dinner WALLY Baptiste      benztropine  1 mg Intramuscular Q4H PRN Max 6/day Basilio Brown MD      benztropine  1 mg Oral Q4H PRN Max 6/day Basilio Brown MD      Cholecalciferol  2,500 Units Oral Daily Dustin Mcallister MD      cyanocobalamin  1,000 mcg Oral Daily WALLY Baptiste      Diclofenac Sodium  2 g Topical 4x Daily PRN WALLY Baptiste      hydrOXYzine HCL  50 mg Oral Q6H PRN Max 4/day Basilio Brown MD      Or    diphenhydrAMINE  50 mg Intramuscular Q6H PRN  Basilio Brown MD      diphenhydrAMINE-zinc acetate   Topical Daily PRN Raj Guerrero MD      famotidine  20 mg Oral BID WALLY Baptiste      hydrOXYzine HCL  100 mg Oral Q6H PRN Max 4/day Basilio Brown MD      Or    LORazepam  2 mg Intramuscular Q6H PRN Basilio Brown MD      hydrOXYzine HCL  25 mg Oral Q6H PRN Max 4/day Basilio Brown MD      levothyroxine  37.5 mcg Oral Early Morning WALLY Baptiste      melatonin  6 mg Oral HS PRN WALLY Gomez      methocarbamol  500 mg Oral Q6H PRN WALLY Baptiste      OLANZapine  5 mg Oral Q4H PRN Max 3/day Basilio Brown MD      Or    OLANZapine  2.5 mg Intramuscular Q4H PRN Max 3/day Basilio Brown MD      OLANZapine  5 mg Oral Q3H PRN Max 3/day Basilio Brown MD      Or    OLANZapine  5 mg Intramuscular Q3H PRN Max 3/day Basilio Brown MD      OLANZapine  2.5 mg Oral Q4H PRN Max 6/day Basilio Brown MD      polyethylene glycol  17 g Oral Daily PRN Basilio Brown MD      propranolol  10 mg Oral Q8H PRN Basilio Brown MD      senna-docusate sodium  1 tablet Oral Daily PRN Basilio Brown MD      sertraline  150 mg Oral Daily WALLY Gomez      traZODone  50 mg Oral HS PRN Basilio Brown MD        PRN:    acetaminophen    acetaminophen    acetaminophen    aluminum-magnesium hydroxide-simethicone    Artificial Tears    benztropine    benztropine    Diclofenac Sodium    hydrOXYzine HCL **OR** diphenhydrAMINE    diphenhydrAMINE-zinc acetate    hydrOXYzine HCL **OR** LORazepam    hydrOXYzine HCL    melatonin    methocarbamol    OLANZapine **OR** OLANZapine    OLANZapine **OR** OLANZapine    OLANZapine    polyethylene glycol    propranolol    senna-docusate sodium    traZODone       Subjective     Patient was visited on unit for continuing care; chart reviewed and discussed with multidisciplinary treatment team.  On approach, the patient was calm and  "cooperative. Denied any changes in mood, appetite, and energy level.  Reports that he had a good day yesterday going to group.  Reports being more hopeful about the future.  No other acute complaints or concerns at this time.  Case management continuing to move forward with personal-care home placement as well as Social Security meeting.  No problem initiating and maintaining sleep.  Denied A/VH currently.  Denied SI/HI, intent or plan upon direct inquiry at this time.    Patient continues to be visible in the milieu and interacts with staff and peers. No reports of aggression or self-injurious behavior on unit. No PRN medications used in the past 24 hours.    Patient accepted all offered medications and no adverse effects of medications noted or reported.    Objective    Current Mental Status Evaluation:  Appearance: casually dressed, appears consistent with stated age  Motor: no psychomotor disturbances, no gait abnormalities  Behavior: cooperative, interacts with this writer appropriately  Speech: normal rate, rhythm, and volume  Mood: \"ok\"  Affect: less depressed, normal range and intensity  Thought Process: organized, linear, and goal-oriented  Thought Content: denies delusions  Perception: denies auditory hallucinations, denies visual hallucinations  Risk Potential: denies suicidal ideation, plan, or intent. Denies homicidal ideation  Sensorium: Oriented to person, place, time, and situation  Cognition: cognitive ability appears intact but was not quantitatively tested  Consciousness: alert and awake  Attention: able to focus without difficulty  Insight: limited  Judgement: limited            Vital signs in last 24 hours:    Temp:  [97.2 °F (36.2 °C)-97.4 °F (36.3 °C)] 97.4 °F (36.3 °C)  HR:  [64-75] 64  BP: (130-139)/(68-87) 130/87  Resp:  [16] 16  SpO2:  [95 %-98 %] 98 %  O2 Device: None (Room air)    Psychiatric Review of Systems:  Medication adverse effects: none  Sleep: unchanged  Appetite: " unchanged  Behaviors over the past 24 hours: unchanged    Laboratory results:    I have personally reviewed all pertinent laboratory/tests results  No results found for this or any previous visit (from the past 48 hours).       Progress Toward Goals & Illness Status:   progressing, attends groups, participates in milieu therapy, placement pending    Patient is not at goal. They are not yet ready for discharge. The patient's condition currently requires active psychopharmacological medication management, interdisciplinary coordination with case management, and the utilization of adjunctive milieu and group therapy to augment psychopharmacological efficacy. The patient's risk of morbidity, and progression or decompensation of psychiatric disease, is higher without this current treatment.     Next of Kin  Extended Emergency Contact Information  Primary Emergency Contact: Lois Roberson  Address: [address] [place] [address]  Home Phone: [phone]  Relation: Mother    Counseling / Coordination of Care  Patient's progress discussed with staff in treatment team meeting.  Medications, treatment progress and treatment plan reviewed with patient.  Medication education provided to patient.  Educated on importance of medication and treatment compliance.  Supportive therapy provided to patient.  Cognitive techniques utilized during the session.  Reassurance and supportive therapy provided.  Reoriented to reality and reassured.  Encouraged participation in milieu and group therapy on the unit.  Crisis/safety plan discussed with patient.       Dustin Mcallister MD  Attending Psychiatrist   [facility]      This note has been constructed using a voice recognition system. There may be translation, syntax, or grammatical errors. If you have any questions, please contact the dictating provider.

## 2024-11-14 NOTE — SOCIAL WORK
Provider completed and returned the SSA form to Cm.     Cm and Pt completed SSA interview and Pt was denied for SSI and SSD. Pt was denied from SSD due to not having work credits and SSI from having the 20,000 in his bank account currently.   Pt understood this and processed it with Cm.    Cm and Pt called Personal Care homes together.

## 2024-11-15 PROCEDURE — 99232 SBSQ HOSP IP/OBS MODERATE 35: CPT | Performed by: PSYCHIATRY & NEUROLOGY

## 2024-11-15 RX ADMIN — FAMOTIDINE 20 MG: 20 TABLET ORAL at 08:48

## 2024-11-15 RX ADMIN — SERTRALINE HYDROCHLORIDE 150 MG: 100 TABLET ORAL at 08:48

## 2024-11-15 RX ADMIN — Medication 2500 UNITS: at 08:48

## 2024-11-15 RX ADMIN — ARIPIPRAZOLE 5 MG: 5 TABLET ORAL at 08:48

## 2024-11-15 RX ADMIN — LEVOTHYROXINE SODIUM 37.5 MCG: 125 TABLET ORAL at 06:17

## 2024-11-15 RX ADMIN — CYANOCOBALAMIN TAB 1000 MCG 1000 MCG: 1000 TAB at 08:48

## 2024-11-15 RX ADMIN — FAMOTIDINE 20 MG: 20 TABLET ORAL at 18:20

## 2024-11-15 RX ADMIN — ATORVASTATIN CALCIUM 10 MG: 10 TABLET, FILM COATED ORAL at 16:30

## 2024-11-15 NOTE — NURSING NOTE
Patient was isolative to room, only out for needs. Patient appears depressed and disheveled. Patient compliant with scheduled medications. Q15 observation maintained.

## 2024-11-15 NOTE — PROGRESS NOTES
11/15/24 0843   Team Meeting   Meeting Type Daily Rounds   Team Members Present   Team Members Present Physician;Nurse;   Physician Team Member Ary   Nursing Team Member Erick   Care Management Team Member Noa   Patient/Family Present   Patient Present No   Patient's Family Present No     Pt is medication and meal compliant. Pt denies SI/HI/AVH. Pt is calm and cooperative. Pt is pending placement.

## 2024-11-15 NOTE — ASSESSMENT & PLAN NOTE
"No medications changes at this time, optimize as indicated;  Patient continues to be pending group home placement. Pt denied SSI and SSD, CM cont to work on personal care home placement and assisting pt w/ finances.    Abilify 5 mg daily as mood adjunct   Zoloft 150 mg daily for depression and anxiety  Continue to encourage participation in group therapy, milieu therapy and occupational therapy.  Continue to assess for side effects of medications.  Continue collaboration with [facility] for medical co-morbidities as indicated.  Continue discussion with CM/SW to assist with obtaining collateral, disposition planning, and the implementation of patient-centered individualized plan of care.  Continue frequent safety checks and vitals per unit protocol.    Risks, benefits and possible side effects of Medications: Risks, benefits, and possible side effects of medications have previously been explained. No new medications at this time.      Legal status: 201    Disposition: to be determined, pending SOAR application for potential group home placement. OT Cognitive Evaluation completed: \"Pt would benefit from discharge to a supportive environment that can provide checks for safety and compliance with IADLs. \"   Although patient's mood has stabilized, they are currently awaiting group home placement.  Their ability to recognize safety hazards take medications and participate in IADLs are significantly impaired by their cognitive deficits compounded by their chronic mental health needs and would be at risk for significant decompensation without the structure and support of a group home setting.      No associated orders from this encounter found during lookback period of 72 hours.  "

## 2024-11-15 NOTE — NURSING NOTE
Franco was calm and cooperative during assessment. He reports depression as a 4/10 and anxiety as 2/10. He stated that in reference to these ratings, he was thinking about his mother's passing last year and felt sad while thinking about this. This RN reassured patient and offered chaplaincy services, which he declined at this time but stated he would keep this resource in mind. Franco denied SI, SH, HI, and AVH. He was medication compliant and did not request PRN medications. He attended meals and group programming in the milieu. He is able to make his needs known. He denies new concerns at this time.

## 2024-11-15 NOTE — PROGRESS NOTES
"Progress Note - Behavioral Health   Name: Franco Roberson 39 y.o. male I MRN: [id number]  Unit/Bed#: -02 I Date of Admission: 5/14/2024   Date of Service: 11/15/2024 I Hospital Day: 185     Assessment & Plan  MDD (major depressive disorder), recurrent severe, without psychosis (HCC)  No medications changes at this time, optimize as indicated;  Patient continues to be pending group home placement. Pt denied SSI and SSD, CM cont to work on personal care home placement and assisting pt w/ finances.    Abilify 5 mg daily as mood adjunct   Zoloft 150 mg daily for depression and anxiety  Continue to encourage participation in group therapy, milieu therapy and occupational therapy.  Continue to assess for side effects of medications.  Continue collaboration with [facility] for medical co-morbidities as indicated.  Continue discussion with CM/SW to assist with obtaining collateral, disposition planning, and the implementation of patient-centered individualized plan of care.  Continue frequent safety checks and vitals per unit protocol.    Risks, benefits and possible side effects of Medications: Risks, benefits, and possible side effects of medications have previously been explained. No new medications at this time.      Legal status: 201    Disposition: to be determined, pending SOAR application for potential group home placement. OT Cognitive Evaluation completed: \"Pt would benefit from discharge to a supportive environment that can provide checks for safety and compliance with IADLs. \"   Although patient's mood has stabilized, they are currently awaiting group home placement.  Their ability to recognize safety hazards take medications and participate in IADLs are significantly impaired by their cognitive deficits compounded by their chronic mental health needs and would be at risk for significant decompensation without the structure and support of a group home setting.      No associated orders from this encounter found " during lookback period of 72 hours.  Autism spectrum disorder  Continue supportive care    No associated orders from this encounter found during lookback period of 72 hours.        Plan     Recommended Treatment:    - Encourage early mobility and having a structured day  - Provide frequent re-orientation, and cognitive stimulation  - Ensure assistive devices are in proper working order (eye-glasses, hearing aids)  - Encourage adequate hydration, nutrition and monitor bowel movements  - Maintain sleep-wake cycle: Uninterrupted sleep time; low-level lighting at night  - Fall precaution  - f/u [facility] recs regarding the medical problems   - Continue medication titration and treatment plan; adjust medication to optimize treatment response and as clinically indicated. .   - Observation: routine            - VS: as per unit protocol  - Diet: Regular diet  - Encourage group attendance and milieu therapy  - Dispo: To be determined     Scheduled medications:  Current Facility-Administered Medications   Medication Dose Route Frequency Provider Last Rate    acetaminophen  650 mg Oral Q6H PRN Basilio Brown MD      acetaminophen  650 mg Oral Q4H PRN Basilio Brown MD      acetaminophen  975 mg Oral Q6H PRN Basilio Brown MD      aluminum-magnesium hydroxide-simethicone  30 mL Oral Q4H PRN Basilio Brown MD      ARIPiprazole  5 mg Oral Daily Basilio Panda MD      Artificial Tears  1 drop Both Eyes Q3H PRN Basilio Brown MD      atorvastatin  10 mg Oral Daily With Dinner WALLY Baptiste      benztropine  1 mg Intramuscular Q4H PRN Max 6/day Basilio Brown MD      benztropine  1 mg Oral Q4H PRN Max 6/day Basilio Brown MD      Cholecalciferol  2,500 Units Oral Daily Dustin Mcallister MD      cyanocobalamin  1,000 mcg Oral Daily WALLY Baptiste      Diclofenac Sodium  2 g Topical 4x Daily PRN WALLY Baptiste      hydrOXYzine HCL  50 mg Oral Q6H PRN Max  4/day Basilio Brown MD      Or    diphenhydrAMINE  50 mg Intramuscular Q6H PRN Basilio Brown MD      diphenhydrAMINE-zinc acetate   Topical Daily PRN Raj Guerrero MD      famotidine  20 mg Oral BID WALLY Baptiste      hydrOXYzine HCL  100 mg Oral Q6H PRN Max 4/day Basilio Brown MD      Or    LORazepam  2 mg Intramuscular Q6H PRN Basilio Brown MD      hydrOXYzine HCL  25 mg Oral Q6H PRN Max 4/day Basilio Brown MD      levothyroxine  37.5 mcg Oral Early Morning WALLY Baptiste      melatonin  6 mg Oral HS PRN WALLY Gomez      methocarbamol  500 mg Oral Q6H PRN WALLY Baptiste      OLANZapine  5 mg Oral Q4H PRN Max 3/day Basilio Brown MD      Or    OLANZapine  2.5 mg Intramuscular Q4H PRN Max 3/day Basilio Brown MD      OLANZapine  5 mg Oral Q3H PRN Max 3/day Basilio Brown MD      Or    OLANZapine  5 mg Intramuscular Q3H PRN Max 3/day Basilio Brown MD      OLANZapine  2.5 mg Oral Q4H PRN Max 6/day Basilio Brown MD      polyethylene glycol  17 g Oral Daily PRN Basilio Brown MD      propranolol  10 mg Oral Q8H PRN Basilio Brown MD      senna-docusate sodium  1 tablet Oral Daily PRN Basilio Brown MD      sertraline  150 mg Oral Daily WALLY Gomez      traZODone  50 mg Oral HS PRN Basilio Brown MD        PRN:    acetaminophen    acetaminophen    acetaminophen    aluminum-magnesium hydroxide-simethicone    Artificial Tears    benztropine    benztropine    Diclofenac Sodium    hydrOXYzine HCL **OR** diphenhydrAMINE    diphenhydrAMINE-zinc acetate    hydrOXYzine HCL **OR** LORazepam    hydrOXYzine HCL    melatonin    methocarbamol    OLANZapine **OR** OLANZapine    OLANZapine **OR** OLANZapine    OLANZapine    polyethylene glycol    propranolol    senna-docusate sodium    traZODone       Subjective     Patient was visited on unit for continuing care; chart reviewed and  "discussed with multidisciplinary treatment team.  On approach, the patient was calm and cooperative. On approach, the patient was calm and cooperative.  Reports that his mood is doing okay overall.  Some more affective brightening this morning compared to previous days.  Was informed by social work that patient has been denied from SSD and SSI.  Case management continuing to work on dispo options regarding personal-care home as well as assisting patient with finances. No problem initiating and maintaining sleep.  Denied A/VH currently.  Denied SI/HI, intent or plan upon direct inquiry at this time.    Patient continues to be visible in the milieu and interacts with staff and peers. No reports of aggression or self-injurious behavior on unit. No PRN medications used in the past 24 hours.    Patient accepted all offered medications and no adverse effects of medications noted or reported.    Objective    Current Mental Status Evaluation:  Appearance: casually dressed, consistent with stated age  Motor: no psychomotor retardation, no gait abnormalities  Behavior: cooperative, answers questions appropriately  Speech: soft, normal rhythm  Mood: \"ok\"  Affect: constricted, some affective brightening  Thought Process: linear and goal-oriented  Thought Content: denies delusions  Risk Potential: denies suicidal ideation, plan, or intent. Denies homicidal ideation  Perceptions: denies auditory hallucinations, denies visual hallucinations,   Sensorium: Oriented to person, place, time, and situation  Cognition: cognitive ability appears intact but was not quantitatively tested  Consciousness: alert and awake  Attention: intact, able to focus without difficulty  Insight: limited  Judgement: limited            Vital signs in last 24 hours:    Temp:  [97 °F (36.1 °C)-97.9 °F (36.6 °C)] 97.9 °F (36.6 °C)  HR:  [71-80] 71  BP: (115-129)/(73-79) 115/73  Resp:  [16] 16  SpO2:  [96 %] 96 %  O2 Device: None (Room air)    Psychiatric " Review of Systems:  Medication adverse effects: none  Sleep: unchanged  Appetite: unchanged  Behaviors over the past 24 hours: unchanged    Laboratory results:    I have personally reviewed all pertinent laboratory/tests results  No results found for this or any previous visit (from the past 48 hours).       Progress Toward Goals & Illness Status:   progressing, mood is stabilizing, depression is improving, discharge planning, placement pending    Patient is not at goal. They are not yet ready for discharge. The patient's condition currently requires active psychopharmacological medication management, interdisciplinary coordination with case management, and the utilization of adjunctive milieu and group therapy to augment psychopharmacological efficacy. The patient's risk of morbidity, and progression or decompensation of psychiatric disease, is higher without this current treatment.     Next of Kin  Extended Emergency Contact Information  Primary Emergency Contact: Lois Roberson  Address: [address] [place] [address] [address]  Home Phone: [phone]  Relation: Mother    Counseling / Coordination of Care  Patient's progress discussed with staff in treatment team meeting.  Medications, treatment progress and treatment plan reviewed with patient.  Medication education provided to patient.  Educated on importance of medication and treatment compliance.  Supportive therapy provided to patient.  Cognitive techniques utilized during the session.  Reassurance and supportive therapy provided.  Reoriented to reality and reassured.  Encouraged participation in milieu and group therapy on the unit.  Crisis/safety plan discussed with patient.       Dustin Mcallister MD  Attending Psychiatrist   [facility]      This note has been constructed using a voice recognition system. There may be translation, syntax, or grammatical errors. If you have any questions,  please contact the dictating provider.

## 2024-11-15 NOTE — NURSING NOTE
Pt calm and cooperative. Visible on the unit but seclusive to self. Denies SI/HI/AH/VH, depression, or anxiety. Appears anxious when discussing discharge planning and social security but still remains hopeful. Medication and meal compliant. No current unmet needs.

## 2024-11-15 NOTE — PLAN OF CARE
Problem: Ineffective Coping  Goal: Participates in unit activities  Description: Interventions:  - Provide therapeutic environment   - Provide required programming   - Redirect inappropriate behaviors   Outcome: Progressing     Problem: Depression  Goal: Treatment Goal: Demonstrate behavioral control of depressive symptoms, verbalize feelings of improved mood/affect, and adopt new coping skills prior to discharge  Outcome: Progressing  Goal: Verbalize thoughts and feelings  Description: Interventions:  - Assess and re-assess patient's level of risk   - Engage patient in 1:1 interactions, daily, for a minimum of 15 minutes   - Encourage patient to express feelings, fears, frustrations, hopes   Outcome: Progressing  Goal: Refrain from harming self  Description: Interventions:  - Monitor patient closely, per order   - Supervise medication ingestion, monitor effects and side effects   Outcome: Progressing  Goal: Refrain from isolation  Description: Interventions:  - Develop a trusting relationship   - Encourage socialization   Outcome: Progressing     Problem: Anxiety  Goal: Anxiety is at manageable level  Description: Interventions:  - Assess and monitor patient's anxiety level.   - Monitor for signs and symptoms (heart palpitations, chest pain, shortness of breath, headaches, nausea, feeling jumpy, restlessness, irritable, apprehensive).   - Collaborate with interdisciplinary team and initiate plan and interventions as ordered.  - [place] patient to unit/surroundings  - Explain treatment plan  - Encourage participation in care  - Encourage verbalization of concerns/fears  - Identify coping mechanisms  - Assist in developing anxiety-reducing skills  - Administer/offer alternative therapies  - Limit or eliminate stimulants  Outcome: Progressing     Problem: DISCHARGE PLANNING - CARE MANAGEMENT  Goal: Discharge to post-acute care or home with appropriate resources  Description: INTERVENTIONS:  - Conduct assessment to  determine patient/family and health care team treatment goals, and need for post-acute services based on payer coverage, community resources, and patient preferences, and barriers to discharge  - Address psychosocial, clinical, and financial barriers to discharge as identified in assessment in conjunction with the patient/family and health care team  - Arrange appropriate level of post-acute services according to patient’s   needs and preference and payer coverage in collaboration with the physician and health care team  - Communicate with and update the patient/family, physician, and health care team regarding progress on the discharge plan  - Arrange appropriate transportation to post-acute venues  Outcome: Progressing     Problem: Knowledge Deficit  Goal: Patient/family/caregiver demonstrates understanding of disease process, treatment plan, medications, and discharge instructions  Description: Complete learning assessment and assess knowledge base.  Interventions:  - Provide teaching at level of understanding  - Provide teaching via preferred learning methods  Outcome: Progressing     Problem: SLEEP DISTURBANCE  Goal: Will exhibit normal sleeping pattern  Description: Interventions:  -  Assess the patients sleep pattern, noting recent changes  - Administer medication as ordered  - Decrease environmental stimuli, including noise, as appropriate during the night  - Encourage the patient to actively participate in unit groups and or exercise during the day to enhance ability to achieve adequate sleep at night  - Assess the patient, in the morning, encouraging a description of sleep experience  Outcome: Progressing

## 2024-11-16 PROCEDURE — 99232 SBSQ HOSP IP/OBS MODERATE 35: CPT | Performed by: PSYCHIATRY & NEUROLOGY

## 2024-11-16 RX ADMIN — ARIPIPRAZOLE 5 MG: 5 TABLET ORAL at 08:35

## 2024-11-16 RX ADMIN — Medication 2500 UNITS: at 08:35

## 2024-11-16 RX ADMIN — ATORVASTATIN CALCIUM 10 MG: 10 TABLET, FILM COATED ORAL at 17:16

## 2024-11-16 RX ADMIN — CYANOCOBALAMIN TAB 1000 MCG 1000 MCG: 1000 TAB at 08:35

## 2024-11-16 RX ADMIN — LEVOTHYROXINE SODIUM 37.5 MCG: 125 TABLET ORAL at 06:48

## 2024-11-16 RX ADMIN — SERTRALINE HYDROCHLORIDE 150 MG: 100 TABLET ORAL at 08:35

## 2024-11-16 RX ADMIN — FAMOTIDINE 20 MG: 20 TABLET ORAL at 08:35

## 2024-11-16 RX ADMIN — FAMOTIDINE 20 MG: 20 TABLET ORAL at 17:16

## 2024-11-16 NOTE — NURSING NOTE
Patient has been occasionally visible on the unit walking in the halls. He is pleasant and calm. Patient denies all signs and symptoms. He is med/meal compliant.

## 2024-11-16 NOTE — PROGRESS NOTES
"    Psychiatric Progress Note - Department of Behavioral Health   Franco Roberson 39 y.o. male MRN: [id number]  Unit/Bed#: [facility] 347-02 Encounter: [id number]    ASSESSMENT & PLAN     Assessment & Plan  MDD (major depressive disorder), recurrent severe, without psychosis (HCC)  No medications changes at this time, optimize as indicated;  Patient continues to be pending group home placement. Pt denied SSI and SSD, CM cont to work on personal care home placement and assisting pt w/ finances.    Abilify 5 mg daily as mood adjunct   Zoloft 150 mg daily for depression and anxiety  Continue to encourage participation in group therapy, milieu therapy and occupational therapy.  Continue to assess for side effects of medications.  Continue collaboration with [facility] for medical co-morbidities as indicated.  Continue discussion with CM/SW to assist with obtaining collateral, disposition planning, and the implementation of patient-centered individualized plan of care.  Continue frequent safety checks and vitals per unit protocol.    Risks, benefits and possible side effects of Medications: Risks, benefits, and possible side effects of medications have previously been explained. No new medications at this time.      Legal status: 201    Disposition: to be determined, pending SOAR application for potential group home placement. OT Cognitive Evaluation completed: \"Pt would benefit from discharge to a supportive environment that can provide checks for safety and compliance with IADLs. \"   Although patient's mood has stabilized, they are currently awaiting group home placement.  Their ability to recognize safety hazards take medications and participate in IADLs are significantly impaired by their cognitive deficits compounded by their chronic mental health needs and would be at risk for significant decompensation without the structure and support of a group home setting.      No associated orders from this encounter found during lookback " "period of 72 hours.  Autism spectrum disorder  Continue supportive care    No associated orders from this encounter found during lookback period of 72 hours.        SUBJECTIVE     Patient evaluated this a.m. for continuity of care. Patient was discussed at length with nursing and treatment team. Per nursing, patient continues to be in good behavioral control.  Nursing also noted that the anniversary of the patient's mother's passing is coming up in December and he has been depressed regarding this.    Patient today reported his mood is \"alright.\"  He endorsed adequate sleep and appetite.  He denied medication side effects.  He denied suicidal ideation.  He denied homicidal ideation.  He denied auditory and visual hallucinations.    PSYCHIATRIC REVIEW OF SYSTEMS     Behavior over the last 24 hours:  unchanged  Sleep: normal  Appetite: normal  Medication side effects: No    REVIEW OF SYSTEMS   Review of systems: no complaints    OBJECTIVE     Vital Signs in Past 24 Hours:  Temp:  [97 °F (36.1 °C)-97.3 °F (36.3 °C)] 97 °F (36.1 °C)  HR:  [67-83] 67  BP: (124-126)/(70-77) 126/77  Resp:  [16-17] 16  SpO2:  [97 %-98 %] 98 %  O2 Device: None (Room air)    Intake/Output in Past 24 hours:  No intake/output data recorded.  No intake/output data recorded.        Laboratory Results:  I have personally reviewed all pertinent laboratory/tests results.    Behavioral Health Medications: all current active meds have been reviewed.    Mental Status Evaluation:  Appearance:  Overtly  male, appears stated age, poor grooming/hygiene, dressed casually   Behavior:  Pleasant and cooperative   Speech:  Normal rate and soft volume   Mood:  \"alright.\"   Affect:  blunted and mood-incongruent   Language naming objects and repeating phrases   Thought Process:  goal directed and logical   Thought Content:  No overt delusions or paranoia   Perceptual Disturbances: Denied auditory and visual hallucinations.  Does not appear to be " responding to internal stimuli.   Risk Potential: Suicidal Ideations none, Homicidal Ideations none, and Potential for Aggression No   Sensorium:  person, place, and time/date   Cognition:  recent and remote memory grossly intact   Consciousness:  alert and awake    Attention: attention span and concentration were age appropriate   Insight:  limited   Judgment: limited   Intellect Not assessed   Gait/Station: normal gait/station   Motor Activity: no abnormal movements     Memory: Short and long term memory intact     Progress Toward Goals: Unchanged, as evidenced by their participation (or lack thereof) in individual, social and therapeutic milieu in addition to adherence to their medication regimen.    Recommended Treatment:   See above for assessment and plan.    Inpatient Psychiatric Certification: Based upon physical, mental and social evaluations, I certify that inpatient psychiatric services are medically necessary for this patient for a duration of greater than 2 midnights for the treatment of MDD (major depressive disorder), recurrent severe, without psychosis (HCC) including psychotropic medication management, participation in the therapeutic milieu and referrals as indicated. Available alternative community resources do not meet the patient's mental health care needs. I further attest that an established written individualized plan of care has been implemented and is outlined in the patient's medical records.    Counseling/Coordination of Care    I have expended greater than 15 minutes in which more than 50% of this time was expended in counseling/coordination of patient care relating to diagnostic results, prognosis, potential risks and benefits of management options, instructions for appropriate management, patient and/or collateral education, importance of adherence to management and/or risk factor reductions. Patient's rights, confidentiality, exceptions to confidentiality, use of K121 medical  record including appropriate staff access to medical record regarding behavioral health services and consent to treatment were reviewed.    Note Share:     This note was not shared with the patient due to reasonable likelihood of causing patient harm     This note has been constructed using a voice recognition system. There may be translation, syntax,  or grammatical errors. If you have any questions, please contact the dictating provider.    Jaret Rodriguez, DO 11/16/24   PGY-II

## 2024-11-16 NOTE — PLAN OF CARE
Problem: Ineffective Coping  Goal: Participates in unit activities  Description: Interventions:  - Provide therapeutic environment   - Provide required programming   - Redirect inappropriate behaviors   Outcome: Progressing     Problem: Depression  Goal: Treatment Goal: Demonstrate behavioral control of depressive symptoms, verbalize feelings of improved mood/affect, and adopt new coping skills prior to discharge  Outcome: Progressing  Goal: Verbalize thoughts and feelings  Description: Interventions:  - Assess and re-assess patient's level of risk   - Engage patient in 1:1 interactions, daily, for a minimum of 15 minutes   - Encourage patient to express feelings, fears, frustrations, hopes   Outcome: Progressing  Goal: Refrain from harming self  Description: Interventions:  - Monitor patient closely, per order   - Supervise medication ingestion, monitor effects and side effects   Outcome: Progressing  Goal: Refrain from isolation  Description: Interventions:  - Develop a trusting relationship   - Encourage socialization   Outcome: Progressing     Problem: Anxiety  Goal: Anxiety is at manageable level  Description: Interventions:  - Assess and monitor patient's anxiety level.   - Monitor for signs and symptoms (heart palpitations, chest pain, shortness of breath, headaches, nausea, feeling jumpy, restlessness, irritable, apprehensive).   - Collaborate with interdisciplinary team and initiate plan and interventions as ordered.  - [place] patient to unit/surroundings  - Explain treatment plan  - Encourage participation in care  - Encourage verbalization of concerns/fears  - Identify coping mechanisms  - Assist in developing anxiety-reducing skills  - Administer/offer alternative therapies  - Limit or eliminate stimulants  Outcome: Progressing     Problem: Knowledge Deficit  Goal: Patient/family/caregiver demonstrates understanding of disease process, treatment plan, medications, and discharge  instructions  Description: Complete learning assessment and assess knowledge base.  Interventions:  - Provide teaching at level of understanding  - Provide teaching via preferred learning methods  Outcome: Progressing     Problem: SLEEP DISTURBANCE  Goal: Will exhibit normal sleeping pattern  Description: Interventions:  -  Assess the patients sleep pattern, noting recent changes  - Administer medication as ordered  - Decrease environmental stimuli, including noise, as appropriate during the night  - Encourage the patient to actively participate in unit groups and or exercise during the day to enhance ability to achieve adequate sleep at night  - Assess the patient, in the morning, encouraging a description of sleep experience  Outcome: Progressing

## 2024-11-16 NOTE — NURSING NOTE
"Pt visible on unit and pleasant on approach. Disheveled with poor ADLs; states he is going to shave and shower tomorrow when asked about facial hair; smiles and denies all psych symptoms in conversation. States he is doing \"okay\" and denies all needs and concerns. Compliant with unit routines. Retired to bed after snack. Safety checks ongoing.  "

## 2024-11-17 PROCEDURE — 99232 SBSQ HOSP IP/OBS MODERATE 35: CPT | Performed by: PSYCHIATRY & NEUROLOGY

## 2024-11-17 RX ADMIN — FAMOTIDINE 20 MG: 20 TABLET ORAL at 17:13

## 2024-11-17 RX ADMIN — ARIPIPRAZOLE 5 MG: 5 TABLET ORAL at 08:56

## 2024-11-17 RX ADMIN — SERTRALINE HYDROCHLORIDE 150 MG: 100 TABLET ORAL at 08:56

## 2024-11-17 RX ADMIN — FAMOTIDINE 20 MG: 20 TABLET ORAL at 08:56

## 2024-11-17 RX ADMIN — LEVOTHYROXINE SODIUM 37.5 MCG: 125 TABLET ORAL at 06:19

## 2024-11-17 RX ADMIN — CYANOCOBALAMIN TAB 1000 MCG 1000 MCG: 1000 TAB at 08:56

## 2024-11-17 RX ADMIN — Medication 2500 UNITS: at 08:55

## 2024-11-17 RX ADMIN — ATORVASTATIN CALCIUM 10 MG: 10 TABLET, FILM COATED ORAL at 17:13

## 2024-11-17 NOTE — PLAN OF CARE
Problem: Ineffective Coping  Goal: Participates in unit activities  Description: Interventions:  - Provide therapeutic environment   - Provide required programming   - Redirect inappropriate behaviors   Outcome: Progressing     Problem: Depression  Goal: Treatment Goal: Demonstrate behavioral control of depressive symptoms, verbalize feelings of improved mood/affect, and adopt new coping skills prior to discharge  Outcome: Progressing  Goal: Verbalize thoughts and feelings  Description: Interventions:  - Assess and re-assess patient's level of risk   - Engage patient in 1:1 interactions, daily, for a minimum of 15 minutes   - Encourage patient to express feelings, fears, frustrations, hopes   Outcome: Progressing  Goal: Refrain from harming self  Description: Interventions:  - Monitor patient closely, per order   - Supervise medication ingestion, monitor effects and side effects   Outcome: Progressing  Goal: Refrain from isolation  Description: Interventions:  - Develop a trusting relationship   - Encourage socialization   Outcome: Progressing     Problem: Anxiety  Goal: Anxiety is at manageable level  Description: Interventions:  - Assess and monitor patient's anxiety level.   - Monitor for signs and symptoms (heart palpitations, chest pain, shortness of breath, headaches, nausea, feeling jumpy, restlessness, irritable, apprehensive).   - Collaborate with interdisciplinary team and initiate plan and interventions as ordered.  - [place] patient to unit/surroundings  - Explain treatment plan  - Encourage participation in care  - Encourage verbalization of concerns/fears  - Identify coping mechanisms  - Assist in developing anxiety-reducing skills  - Administer/offer alternative therapies  - Limit or eliminate stimulants  Outcome: Progressing     Problem: DISCHARGE PLANNING - CARE MANAGEMENT  Goal: Discharge to post-acute care or home with appropriate resources  Description: INTERVENTIONS:  - Conduct assessment to  determine patient/family and health care team treatment goals, and need for post-acute services based on payer coverage, community resources, and patient preferences, and barriers to discharge  - Address psychosocial, clinical, and financial barriers to discharge as identified in assessment in conjunction with the patient/family and health care team  - Arrange appropriate level of post-acute services according to patient’s   needs and preference and payer coverage in collaboration with the physician and health care team  - Communicate with and update the patient/family, physician, and health care team regarding progress on the discharge plan  - Arrange appropriate transportation to post-acute venues  Outcome: Progressing     Problem: Knowledge Deficit  Goal: Patient/family/caregiver demonstrates understanding of disease process, treatment plan, medications, and discharge instructions  Description: Complete learning assessment and assess knowledge base.  Interventions:  - Provide teaching at level of understanding  - Provide teaching via preferred learning methods  Outcome: Progressing     Problem: SLEEP DISTURBANCE  Goal: Will exhibit normal sleeping pattern  Description: Interventions:  -  Assess the patients sleep pattern, noting recent changes  - Administer medication as ordered  - Decrease environmental stimuli, including noise, as appropriate during the night  - Encourage the patient to actively participate in unit groups and or exercise during the day to enhance ability to achieve adequate sleep at night  - Assess the patient, in the morning, encouraging a description of sleep experience  Outcome: Progressing

## 2024-11-17 NOTE — NURSING NOTE
Patient has been visible in the milieu this evening, including attending  group. He interacts with peers he is more familiar with but he is pleasant to all.   He denies S.I.H.I.A/H V/H

## 2024-11-17 NOTE — PROGRESS NOTES
"Progress Note - Behavioral Health   Name: Franco Roberson 39 y.o. male I MRN: [id number]  Unit/Bed#: -02 I Date of Admission: 5/14/2024   Date of Service: 11/17/2024 I Hospital Day: 187     Assessment & Plan  MDD (major depressive disorder), recurrent severe, without psychosis (HCC)  No medications changes at this time, optimize as indicated;  Patient continues to be pending group home placement. Pt denied SSI and SSD, CM cont to work on personal care home placement and assisting pt w/ finances.    Abilify 5 mg daily as mood adjunct   Zoloft 150 mg daily for depression and anxiety  Continue to encourage participation in group therapy, milieu therapy and occupational therapy.  Continue to assess for side effects of medications.  Continue collaboration with [facility] for medical co-morbidities as indicated.  Continue discussion with CM/SW to assist with obtaining collateral, disposition planning, and the implementation of patient-centered individualized plan of care.  Continue frequent safety checks and vitals per unit protocol.    Risks, benefits and possible side effects of Medications: Risks, benefits, and possible side effects of medications have previously been explained. No new medications at this time.      Legal status: 201    Disposition: to be determined, pending SOAR application for potential group home placement. OT Cognitive Evaluation completed: \"Pt would benefit from discharge to a supportive environment that can provide checks for safety and compliance with IADLs. \"   Although patient's mood has stabilized, they are currently awaiting group home placement.  Their ability to recognize safety hazards take medications and participate in IADLs are significantly impaired by their cognitive deficits compounded by their chronic mental health needs and would be at risk for significant decompensation without the structure and support of a group home setting.      No associated orders from this encounter found " during lookback period of 72 hours.  Autism spectrum disorder  Continue supportive care    No associated orders from this encounter found during lookback period of 72 hours.      Progress Toward Goals: Patient is awating placement into supportive housing, is not hopeful for a quick dc due to financial barriers. No concerns with medications or his new roommate. He is still writing poems but did not attend art group yesterday.     Recommended Treatment: Continue with group therapy, milieu therapy and occupational therapy.      Risks, benefits and possible side effects of Medications:   Risks, benefits, and possible side effects of medications explained to patient and patient verbalizes understanding.      History of Present Illness   Behavior over the last 24 hours:  unchanged  Sleep: normal  Appetite: normal  Medication side effects: No  ROS: no complaints    Subjective:Patient in room and states he is     Objective   Expand All Collapse All    Progress Note - Behavioral Health   Name: Franco Roberson 39 y.o. male I MRN: [id number]  Unit/Bed#: -02 I Date of Admission: 5/14/2024   Date of Service: 11/15/2024 I Hospital Day: 185      Assessment & Plan  MDD (major depressive disorder), recurrent severe, without psychosis (HCC)  No medications changes at this time, optimize as indicated;  Patient continues to be pending group home placement. Pt denied SSI and SSD, CM cont to work on personal care home placement and assisting pt w/ finances.     Abilify 5 mg daily as mood adjunct   Zoloft 150 mg daily for depression and anxiety  Continue to encourage participation in group therapy, milieu therapy and occupational therapy.  Continue to assess for side effects of medications.  Continue collaboration with SLIM for medical co-morbidities as indicated.  Continue discussion with CM/SW to assist with obtaining collateral, disposition planning, and the implementation of patient-centered individualized plan of care.  Continue  "frequent safety checks and vitals per unit protocol.     Risks, benefits and possible side effects of Medications: Risks, benefits, and possible side effects of medications have previously been explained. No new medications at this time.        Legal status: 201     Disposition: to be determined, pending SOAR application for potential group home placement. OT Cognitive Evaluation completed: \"Pt would benefit from discharge to a supportive environment that can provide checks for safety and compliance with IADLs. \"   Although patient's mood has stabilized, they are currently awaiting group home placement.  Their ability to recognize safety hazards take medications and participate in IADLs are significantly impaired by their cognitive deficits compounded by their chronic mental health needs and would be at risk for significant decompensation without the structure and support of a group home setting.        No associated orders from this encounter found during lookback period of 72 hours.  Autism spectrum disorder  Continue supportive care     No associated orders from this encounter found during lookback period of 72 hours.           Plan  Recommended Treatment:    - Encourage early mobility and having a structured day  - Provide frequent re-orientation, and cognitive stimulation  - Ensure assistive devices are in proper working order (eye-glasses, hearing aids)  - Encourage adequate hydration, nutrition and monitor bowel movements  - Maintain sleep-wake cycle: Uninterrupted sleep time; low-level lighting at night  - Fall precaution  - f/u SLIM recs regarding the medical problems   - Continue medication titration and treatment plan; adjust medication to optimize treatment response and as clinically indicated. .   - Observation: routine            - VS: as per unit protocol  - Diet: Regular diet  - Encourage group attendance and milieu therapy  - Dispo: To be determined      Scheduled medications:  Scheduled Medications "            Current Facility-Administered Medications   Medication Dose Route Frequency Provider Last Rate    acetaminophen  650 mg Oral Q6H PRN Basilio Brown MD      acetaminophen  650 mg Oral Q4H PRN Basilio Brown MD      acetaminophen  975 mg Oral Q6H PRN Basilio Brown MD      aluminum-magnesium hydroxide-simethicone  30 mL Oral Q4H PRN Basilio Brown MD      ARIPiprazole  5 mg Oral Daily Basilio Panda MD      Artificial Tears  1 drop Both Eyes Q3H PRN Basilio Brown MD      atorvastatin  10 mg Oral Daily With Dinner WALLY Baptiste      benztropine  1 mg Intramuscular Q4H PRN Max 6/day Basilio Brown MD      benztropine  1 mg Oral Q4H PRN Max 6/day Basilio Brown MD      Cholecalciferol  2,500 Units Oral Daily Dustin Mcallister MD      cyanocobalamin  1,000 mcg Oral Daily WALLY Baptiste      Diclofenac Sodium  2 g Topical 4x Daily PRN WALLY Baptiste      hydrOXYzine HCL  50 mg Oral Q6H PRN Max 4/day Basilio Brown MD       Or    diphenhydrAMINE  50 mg Intramuscular Q6H PRN Basilio Brown MD      diphenhydrAMINE-zinc acetate   Topical Daily PRN Raj Guerrero MD      famotidine  20 mg Oral BID WALLY Baptiste      hydrOXYzine HCL  100 mg Oral Q6H PRN Max 4/day Basilio Brown MD       Or    LORazepam  2 mg Intramuscular Q6H PRN Basilio Brown MD      hydrOXYzine HCL  25 mg Oral Q6H PRN Max 4/day Basilio Brown MD      levothyroxine  37.5 mcg Oral Early Morning WALLY Baptiste      melatonin  6 mg Oral HS PRN WALLY Gomez      methocarbamol  500 mg Oral Q6H PRN WALLY Baptiste      OLANZapine  5 mg Oral Q4H PRN Max 3/day Basilio Brown MD       Or    OLANZapine  2.5 mg Intramuscular Q4H PRN Max 3/day Basilio Brown MD      OLANZapine  5 mg Oral Q3H PRN Max 3/day Basilio Brown MD       Or    OLANZapine  5 mg Intramuscular Q3H PRN Max 3/day  Basilio Brown MD      OLANZapine  2.5 mg Oral Q4H PRN Max 6/day Basilio Brown MD      polyethylene glycol  17 g Oral Daily PRN Basilio Brown MD      propranolol  10 mg Oral Q8H PRN Basilio Brown MD      senna-docusate sodium  1 tablet Oral Daily PRN Basilio Brown MD      sertraline  150 mg Oral Daily WALLY Gomez      traZODone  50 mg Oral HS PRN Basilio Brown MD           PRN:    PRN Medications     acetaminophen    acetaminophen    acetaminophen    aluminum-magnesium hydroxide-simethicone    Artificial Tears    benztropine    benztropine    Diclofenac Sodium    hydrOXYzine HCL **OR** diphenhydrAMINE    diphenhydrAMINE-zinc acetate    hydrOXYzine HCL **OR** LORazepam    hydrOXYzine HCL    melatonin    methocarbamol    OLANZapine **OR** OLANZapine    OLANZapine **OR** OLANZapine    OLANZapine    polyethylene glycol    propranolol    senna-docusate sodium    traZODone           Subjective  Patient was visited on unit for continuing care; chart reviewed and discussed with multidisciplinary treatment team.  On approach, the patient was calm and cooperative. On approach, the patient was calm and cooperative.  Reports that his mood is doing okay overall.  Some more affective brightening this morning compared to previous days.  Was informed by social work that patient has been denied from SSD and SSI.  Case management continuing to work on dispo options regarding personal-care home as well as assisting patient with finances. No problem initiating and maintaining sleep.  Denied A/VH currently.  Denied SI/HI, intent or plan upon direct inquiry at this time.     Patient continues to be visible in the milieu and interacts with staff and peers. No reports of aggression or self-injurious behavior on unit. No PRN medications used in the past 24 hours.     Patient accepted all offered medications and no adverse effects of medications noted or reported.     Objective  Current  "Mental Status Evaluation:  Appearance: casually dressed, malodorous  Motor: no psychomotor retardation, no gait abnormalities  Behavior: cooperative, answers questions appropriately  Speech: soft, normal rhythm  Mood: \"ok\"  Affect: constricted, some affective brightening  Thought Process: linear and goal-oriented  Thought Content: denies delusions  Risk Potential: denies suicidal ideation, plan, or intent. Denies homicidal ideation  Perceptions: denies auditory hallucinations, denies visual hallucinations,   Sensorium: Oriented to person, place, time, and situation  Cognition: cognitive ability appears intact but was not quantitatively tested  Consciousness: alert and awake  Attention: intact, able to focus without difficulty  Insight: limited  Judgement: limited         Medications: all current active meds have been reviewed.      Lab Results: I have reviewed the following results:  Most Recent Labs:   Lab Results   Component Value Date    WBC 6.75 11/12/2024    RBC 5.06 11/12/2024    HGB 15.9 11/12/2024    HCT 46.2 11/12/2024     11/12/2024    RDW 12.8 11/12/2024    NEUTROABS 4.15 11/12/2024    SODIUM 139 11/12/2024    K 4.0 11/12/2024     11/12/2024    CO2 30 11/12/2024    BUN 17 11/12/2024    CREATININE 0.99 11/12/2024    GLUC 85 11/12/2024    GLUF 85 11/12/2024    CALCIUM 9.4 11/12/2024    AST 23 11/12/2024    ALT 32 11/12/2024    ALKPHOS 73 11/12/2024    TP 8.1 11/12/2024    ALB 4.4 11/12/2024    TBILI 0.54 11/12/2024    CHOLESTEROL 168 09/19/2024    HDL 42 09/19/2024    TRIG 137 09/19/2024    LDLCALC 99 09/19/2024    NONHDLC 126 09/19/2024    [id number] 2.532 11/12/2024    FREET4 0.56 (L) 09/10/2024       Administrative Statements   Topics discussed with the patient / family include symptom assessment and management, medication adjustment, and supportive listening.  "

## 2024-11-17 NOTE — NURSING NOTE
"Patient is calm with a blunted affect denying depression, anxiety, SI/HI/AVH. Says he is \"going to be more sad in the coming weeks\" due to the anniversary of his mother's death. Says he is still working on his poetry. No unmet needs currently.   "

## 2024-11-18 PROCEDURE — 99232 SBSQ HOSP IP/OBS MODERATE 35: CPT | Performed by: PSYCHIATRY & NEUROLOGY

## 2024-11-18 RX ADMIN — LEVOTHYROXINE SODIUM 37.5 MCG: 125 TABLET ORAL at 06:19

## 2024-11-18 RX ADMIN — CYANOCOBALAMIN TAB 1000 MCG 1000 MCG: 1000 TAB at 08:12

## 2024-11-18 RX ADMIN — Medication 2500 UNITS: at 08:12

## 2024-11-18 RX ADMIN — ATORVASTATIN CALCIUM 10 MG: 10 TABLET, FILM COATED ORAL at 17:31

## 2024-11-18 RX ADMIN — FAMOTIDINE 20 MG: 20 TABLET ORAL at 17:31

## 2024-11-18 RX ADMIN — ARIPIPRAZOLE 5 MG: 5 TABLET ORAL at 08:12

## 2024-11-18 RX ADMIN — SERTRALINE HYDROCHLORIDE 150 MG: 100 TABLET ORAL at 08:12

## 2024-11-18 RX ADMIN — FAMOTIDINE 20 MG: 20 TABLET ORAL at 08:12

## 2024-11-18 NOTE — NURSING NOTE
Patient is visible on the milieu. Remains withdrawn to self and is observed pacing the unit. Patient is very pleasant, calm and cooperative. Compliant with all nightly medications and does not report any unmet needs at this time. Denies all psych symptoms. Continual rounding maintained for safety.

## 2024-11-18 NOTE — NURSING NOTE
Pt is withdrawn to their room but pleasant and cooperative upon approach. Pt denies SI/ HI / AH /VH.

## 2024-11-18 NOTE — PLAN OF CARE
Problem: Ineffective Coping  Goal: Participates in unit activities  Description: Interventions:  - Provide therapeutic environment   - Provide required programming   - Redirect inappropriate behaviors   Outcome: Progressing     Problem: Depression  Goal: Treatment Goal: Demonstrate behavioral control of depressive symptoms, verbalize feelings of improved mood/affect, and adopt new coping skills prior to discharge  Outcome: Progressing  Goal: Verbalize thoughts and feelings  Description: Interventions:  - Assess and re-assess patient's level of risk   - Engage patient in 1:1 interactions, daily, for a minimum of 15 minutes   - Encourage patient to express feelings, fears, frustrations, hopes   Outcome: Progressing  Goal: Refrain from harming self  Description: Interventions:  - Monitor patient closely, per order   - Supervise medication ingestion, monitor effects and side effects   Outcome: Progressing  Goal: Refrain from isolation  Description: Interventions:  - Develop a trusting relationship   - Encourage socialization   Outcome: Progressing     Problem: Anxiety  Goal: Anxiety is at manageable level  Description: Interventions:  - Assess and monitor patient's anxiety level.   - Monitor for signs and symptoms (heart palpitations, chest pain, shortness of breath, headaches, nausea, feeling jumpy, restlessness, irritable, apprehensive).   - Collaborate with interdisciplinary team and initiate plan and interventions as ordered.  - [place] patient to unit/surroundings  - Explain treatment plan  - Encourage participation in care  - Encourage verbalization of concerns/fears  - Identify coping mechanisms  - Assist in developing anxiety-reducing skills  - Administer/offer alternative therapies  - Limit or eliminate stimulants  Outcome: Progressing     Problem: Knowledge Deficit  Goal: Patient/family/caregiver demonstrates understanding of disease process, treatment plan, medications, and discharge  instructions  Description: Complete learning assessment and assess knowledge base.  Interventions:  - Provide teaching at level of understanding  - Provide teaching via preferred learning methods  Outcome: Progressing     Problem: SLEEP DISTURBANCE  Goal: Will exhibit normal sleeping pattern  Description: Interventions:  -  Assess the patients sleep pattern, noting recent changes  - Administer medication as ordered  - Decrease environmental stimuli, including noise, as appropriate during the night  - Encourage the patient to actively participate in unit groups and or exercise during the day to enhance ability to achieve adequate sleep at night  - Assess the patient, in the morning, encouraging a description of sleep experience  Outcome: Progressing

## 2024-11-18 NOTE — NURSING NOTE
Pt is visible at times in the milieu, calm and cooperative upon approach. Patient is medication and meal compliant. Denies SI, HI, AH, and VH. Denies any needs at this time.

## 2024-11-18 NOTE — PROGRESS NOTES
11/18/24 0838   Team Meeting   Meeting Type Daily Rounds   Team Members Present   Team Members Present Physician;;Nurse   Physician Team Member Ary   [facility]   Patient/Family Present   Patient Present No   Patient's Family Present No     Pt is medication and meal compliant. Pt denies SI/HI/AVH. Pt is calm and cooperative. Pt's discharge is pending placement.

## 2024-11-18 NOTE — PROGRESS NOTES
"Progress Note - Behavioral Health   Name: Franco Roberson 39 y.o. male I MRN: [id number]  Unit/Bed#: -02 I Date of Admission: 5/14/2024   Date of Service: 11/18/2024 I Hospital Day: 188     Assessment & Plan  MDD (major depressive disorder), recurrent severe, without psychosis (HCC)  No medications changes at this time, optimize as indicated;  Patient continues to be pending group home placement. Pt denied SSI and SSD, CM cont to work on personal care home placement and assisting pt w/ finances.    Abilify 5 mg daily as mood adjunct   Zoloft 150 mg daily for depression and anxiety  Continue to encourage participation in group therapy, milieu therapy and occupational therapy.  Continue to assess for side effects of medications.  Continue collaboration with SLIM for medical co-morbidities as indicated.  Continue discussion with CM/SW to assist with obtaining collateral, disposition planning, and the implementation of patient-centered individualized plan of care.  Continue frequent safety checks and vitals per unit protocol.    Risks, benefits and possible side effects of Medications: Risks, benefits, and possible side effects of medications have previously been explained. No new medications at this time.      Legal status: 201    Disposition: to be determined, pending SOAR application for potential group home placement. OT Cognitive Evaluation completed: \"Pt would benefit from discharge to a supportive environment that can provide checks for safety and compliance with IADLs. \"   Although patient's mood has stabilized, they are currently awaiting group home placement.  Their ability to recognize safety hazards take medications and participate in IADLs are significantly impaired by their cognitive deficits compounded by their chronic mental health needs and would be at risk for significant decompensation without the structure and support of a group home setting.      No associated orders from this encounter found " during lookback period of 72 hours.  Autism spectrum disorder  Continue supportive care    No associated orders from this encounter found during lookback period of 72 hours.        Plan     Recommended Treatment:    - Encourage early mobility and having a structured day  - Provide frequent re-orientation, and cognitive stimulation  - Ensure assistive devices are in proper working order (eye-glasses, hearing aids)  - Encourage adequate hydration, nutrition and monitor bowel movements  - Maintain sleep-wake cycle: Uninterrupted sleep time; low-level lighting at night  - Fall precaution  - f/u SLIM recs regarding the medical problems   - Continue medication titration and treatment plan; adjust medication to optimize treatment response and as clinically indicated. .   - Observation: routine            - VS: as per unit protocol  - Diet: Regular diet  - Encourage group attendance and milieu therapy  - Dispo: To be determined     Scheduled medications:  Current Facility-Administered Medications   Medication Dose Route Frequency Provider Last Rate    acetaminophen  650 mg Oral Q6H PRN Basilio Brown MD      acetaminophen  650 mg Oral Q4H PRN Basilio Brown MD      acetaminophen  975 mg Oral Q6H PRN Basilio Brown MD      aluminum-magnesium hydroxide-simethicone  30 mL Oral Q4H PRN Basilio Brown MD      ARIPiprazole  5 mg Oral Daily Basilio Panda MD      Artificial Tears  1 drop Both Eyes Q3H PRN Basilio Brown MD      atorvastatin  10 mg Oral Daily With Dinner WALLY Baptiste      benztropine  1 mg Intramuscular Q4H PRN Max 6/day Basilio Brown MD      benztropine  1 mg Oral Q4H PRN Max 6/day Basiilo Brown MD      Cholecalciferol  2,500 Units Oral Daily Dustin Mcallister MD      cyanocobalamin  1,000 mcg Oral Daily WALLY Baptiste      Diclofenac Sodium  2 g Topical 4x Daily PRN WALLY Baptiste      hydrOXYzine HCL  50 mg Oral Q6H PRN Max  4/day Basilio Brown MD      Or    diphenhydrAMINE  50 mg Intramuscular Q6H PRN Basilio Brown MD      diphenhydrAMINE-zinc acetate   Topical Daily PRN Raj Guerrero MD      famotidine  20 mg Oral BID WALLY Baptiste      hydrOXYzine HCL  100 mg Oral Q6H PRN Max 4/day Basilio Brown MD      Or    LORazepam  2 mg Intramuscular Q6H PRN Basilio Brown MD      hydrOXYzine HCL  25 mg Oral Q6H PRN Max 4/day Basilio Brown MD      levothyroxine  37.5 mcg Oral Early Morning WALLY Baptiste      melatonin  6 mg Oral HS PRN WALLY Gomez      methocarbamol  500 mg Oral Q6H PRN WALLY Baptiste      OLANZapine  5 mg Oral Q4H PRN Max 3/day Basilio Brown MD      Or    OLANZapine  2.5 mg Intramuscular Q4H PRN Max 3/day Basilio Brown MD      OLANZapine  5 mg Oral Q3H PRN Max 3/day Basilio Brown MD      Or    OLANZapine  5 mg Intramuscular Q3H PRN Max 3/day Basilio Brown MD      OLANZapine  2.5 mg Oral Q4H PRN Max 6/day Basilio Brown MD      polyethylene glycol  17 g Oral Daily PRN Basilio Brown MD      propranolol  10 mg Oral Q8H PRN Basilio Brown MD      senna-docusate sodium  1 tablet Oral Daily PRN Basilio Brown MD      sertraline  150 mg Oral Daily WALLY Gomez      traZODone  50 mg Oral HS PRN Basilio Brown MD        PRN:    acetaminophen    acetaminophen    acetaminophen    aluminum-magnesium hydroxide-simethicone    Artificial Tears    benztropine    benztropine    Diclofenac Sodium    hydrOXYzine HCL **OR** diphenhydrAMINE    diphenhydrAMINE-zinc acetate    hydrOXYzine HCL **OR** LORazepam    hydrOXYzine HCL    melatonin    methocarbamol    OLANZapine **OR** OLANZapine    OLANZapine **OR** OLANZapine    OLANZapine    polyethylene glycol    propranolol    senna-docusate sodium    traZODone       Subjective     Patient was visited on unit for continuing care; chart reviewed and  "discussed with multidisciplinary treatment team.  On approach, the patient was calm and cooperative. Denied any changes in mood, appetite, and energy level. No problem initiating and maintaining sleep.  Denied A/VH currently.  Denied SI/HI, intent or plan upon direct inquiry at this time.  Patient continues to be hopeful regarding group home/personal care placement.  Patient and  are waiting hear back from one of the group homes they had a meeting with last week.    Patient continues to be selectively interactive with staff and peers. No reports of aggression or self-injurious behavior on unit. No PRN medications used in the past 24 hours.    Patient accepted all offered medications and no adverse effects of medications noted or reported.    Objective    Current Mental Status Evaluation:  Mental Status Exam  Appearance: casually dressed, appears consistent with stated age  Motor: no psychomotor disturbances, no gait abnormalities  Behavior: cooperative, interacts with this writer appropriately  Speech: normal rate, rhythm, and volume  Mood: \"good\"  Affect: euthymic, blunted  Thought Process: organized, linear, and goal-oriented  Thought Content: denies delusions no paranoia observed  Perception:  denies auditory hallucinations, denies visual hallucinations,   Risk Potential: denies suicidal ideation, plan, or intent. Denies homicidal ideation  Sensorium: Oriented to person, place, time, and situation  Cognition: cognitive ability appears intact but was not quantitatively tested  Consciousness: alert and awake  Attention: able to focus without difficulty  Insight: limited  Judgement: limited          Vital signs in last 24 hours:    Temp:  [98.8 °F (37.1 °C)] 98.8 °F (37.1 °C)  HR:  [62-83] 62  BP: (118-128)/(73-77) 118/77  Resp:  [16] 16  SpO2:  [97 %] 97 %  O2 Device: None (Room air)    Psychiatric Review of Systems:  Medication adverse effects: none  Sleep: unchanged  Appetite: unchanged  Behaviors " over the past 24 hours: unchanged    Laboratory results:    I have personally reviewed all pertinent laboratory/tests results  No results found for this or any previous visit (from the past 48 hours).       Progress Toward Goals & Illness Status:   progressing, attends groups, mood is stabilizing, discharge planning, placement pending    Patient is not at goal. They are not yet ready for discharge. The patient's condition currently requires active psychopharmacological medication management, interdisciplinary coordination with case management, and the utilization of adjunctive milieu and group therapy to augment psychopharmacological efficacy. The patient's risk of morbidity, and progression or decompensation of psychiatric disease, is higher without this current treatment.     Next of Kin  Extended Emergency Contact Information  Primary Emergency Contact: Lois Roberson  Address: [address] [place] [address]  Home Phone: [phone]  Relation: Mother    Counseling / Coordination of Care  Patient's progress discussed with staff in treatment team meeting.  Medications, treatment progress and treatment plan reviewed with patient.  Medication education provided to patient.  Educated on importance of medication and treatment compliance.  Supportive therapy provided to patient.  Cognitive techniques utilized during the session.  Reassurance and supportive therapy provided.  Reoriented to reality and reassured.  Encouraged participation in milieu and group therapy on the unit.  Crisis/safety plan discussed with patient.       Dustin Mcallister MD  Attending Psychiatrist   [facility]      This note has been constructed using a voice recognition system. There may be translation, syntax, or grammatical errors. If you have any questions, please contact the dictating provider.

## 2024-11-19 PROCEDURE — 99232 SBSQ HOSP IP/OBS MODERATE 35: CPT | Performed by: PSYCHIATRY & NEUROLOGY

## 2024-11-19 RX ADMIN — Medication 2500 UNITS: at 08:08

## 2024-11-19 RX ADMIN — LEVOTHYROXINE SODIUM 37.5 MCG: 125 TABLET ORAL at 06:29

## 2024-11-19 RX ADMIN — ARIPIPRAZOLE 5 MG: 5 TABLET ORAL at 08:07

## 2024-11-19 RX ADMIN — ATORVASTATIN CALCIUM 10 MG: 10 TABLET, FILM COATED ORAL at 17:33

## 2024-11-19 RX ADMIN — FAMOTIDINE 20 MG: 20 TABLET ORAL at 17:33

## 2024-11-19 RX ADMIN — SERTRALINE HYDROCHLORIDE 150 MG: 100 TABLET ORAL at 08:08

## 2024-11-19 RX ADMIN — FAMOTIDINE 20 MG: 20 TABLET ORAL at 08:08

## 2024-11-19 RX ADMIN — CYANOCOBALAMIN TAB 1000 MCG 1000 MCG: 1000 TAB at 08:08

## 2024-11-19 NOTE — ASSESSMENT & PLAN NOTE
"No medications changes at this time, optimize as indicated;  Patient continues to be pending group home placement. Mood at baseline today. Pt denied SSI and SSD, CM cont to work on personal care home placement and assisting pt w/ finances.    Abilify 5 mg daily as mood adjunct   Zoloft 150 mg daily for depression and anxiety  Continue to encourage participation in group therapy, milieu therapy and occupational therapy.  Continue to assess for side effects of medications.  Continue collaboration with SLIM for medical co-morbidities as indicated.  Continue discussion with CM/SW to assist with obtaining collateral, disposition planning, and the implementation of patient-centered individualized plan of care.  Continue frequent safety checks and vitals per unit protocol.    Risks, benefits and possible side effects of Medications: Risks, benefits, and possible side effects of medications have previously been explained. No new medications at this time.      Legal status: 201    Disposition: to be determined, pending SOAR application for potential group home placement. OT Cognitive Evaluation completed: \"Pt would benefit from discharge to a supportive environment that can provide checks for safety and compliance with IADLs. \"   Although patient's mood has stabilized, they are currently awaiting group home placement.  Their ability to recognize safety hazards take medications and participate in IADLs are significantly impaired by their cognitive deficits compounded by their chronic mental health needs and would be at risk for significant decompensation without the structure and support of a group home setting.      No associated orders from this encounter found during lookback period of 72 hours.  "

## 2024-11-19 NOTE — CASE MANAGEMENT
Writer did fax and mail SSI/SSD SOAR application to [facility]'s [facility] office today with patient's outstanding medical bill and medical records for review and request for expedited review.

## 2024-11-19 NOTE — PROGRESS NOTES
11/19/24 1015   Team Meeting   Meeting Type Daily Rounds   Team Members Present   Team Members Present Physician;Nurse;   Physician Team Member Ary   Nursing Team Member Noa   Patient/Family Present   Patient Present No   Patient's Family Present No     Pt denies SI/HI/AVH. Pt is medication and meal. Pt is calm and cooperative. Pt's discharge is pending placement.

## 2024-11-19 NOTE — NURSING NOTE
Pt is seen attending groups appropriately. Med and meal compliant. Pt is pleasant and cooperative upon approach. Denies all psych symptoms at this time. No needs at this time.

## 2024-11-19 NOTE — PLAN OF CARE
Problem: Ineffective Coping  Goal: Participates in unit activities  Description: Interventions:  - Provide therapeutic environment   - Provide required programming   - Redirect inappropriate behaviors   Outcome: Progressing     Problem: Depression  Goal: Treatment Goal: Demonstrate behavioral control of depressive symptoms, verbalize feelings of improved mood/affect, and adopt new coping skills prior to discharge  Outcome: Progressing  Goal: Verbalize thoughts and feelings  Description: Interventions:  - Assess and re-assess patient's level of risk   - Engage patient in 1:1 interactions, daily, for a minimum of 15 minutes   - Encourage patient to express feelings, fears, frustrations, hopes   Outcome: Progressing  Goal: Refrain from harming self  Description: Interventions:  - Monitor patient closely, per order   - Supervise medication ingestion, monitor effects and side effects   Outcome: Progressing  Goal: Refrain from isolation  Description: Interventions:  - Develop a trusting relationship   - Encourage socialization   Outcome: Progressing     Problem: Anxiety  Goal: Anxiety is at manageable level  Description: Interventions:  - Assess and monitor patient's anxiety level.   - Monitor for signs and symptoms (heart palpitations, chest pain, shortness of breath, headaches, nausea, feeling jumpy, restlessness, irritable, apprehensive).   - Collaborate with interdisciplinary team and initiate plan and interventions as ordered.  - [place] patient to unit/surroundings  - Explain treatment plan  - Encourage participation in care  - Encourage verbalization of concerns/fears  - Identify coping mechanisms  - Assist in developing anxiety-reducing skills  - Administer/offer alternative therapies  - Limit or eliminate stimulants  Outcome: Progressing     Problem: DISCHARGE PLANNING - CARE MANAGEMENT  Goal: Discharge to post-acute care or home with appropriate resources  Description: INTERVENTIONS:  - Conduct assessment to  determine patient/family and health care team treatment goals, and need for post-acute services based on payer coverage, community resources, and patient preferences, and barriers to discharge  - Address psychosocial, clinical, and financial barriers to discharge as identified in assessment in conjunction with the patient/family and health care team  - Arrange appropriate level of post-acute services according to patient’s   needs and preference and payer coverage in collaboration with the physician and health care team  - Communicate with and update the patient/family, physician, and health care team regarding progress on the discharge plan  - Arrange appropriate transportation to post-acute venues  Outcome: Progressing     Problem: Knowledge Deficit  Goal: Patient/family/caregiver demonstrates understanding of disease process, treatment plan, medications, and discharge instructions  Description: Complete learning assessment and assess knowledge base.  Interventions:  - Provide teaching at level of understanding  - Provide teaching via preferred learning methods  Outcome: Progressing     Problem: SLEEP DISTURBANCE  Goal: Will exhibit normal sleeping pattern  Description: Interventions:  -  Assess the patients sleep pattern, noting recent changes  - Administer medication as ordered  - Decrease environmental stimuli, including noise, as appropriate during the night  - Encourage the patient to actively participate in unit groups and or exercise during the day to enhance ability to achieve adequate sleep at night  - Assess the patient, in the morning, encouraging a description of sleep experience  Outcome: Progressing

## 2024-11-19 NOTE — NURSING NOTE
"Pt visible throughout evening, quiet but social when approached. Pt has depressed affect, states, \"It was kind of a down day but I'm alright.\" Nurse encouraging pt. Pt playing chess with others and denies current unmet needs. Pt denies SI/HI/AH/VH.   "

## 2024-11-19 NOTE — PROGRESS NOTES
"Progress Note - Behavioral Health   Name: Franco Roberson 39 y.o. male I MRN: [id number]  Unit/Bed#: -02 I Date of Admission: 5/14/2024   Date of Service: 11/19/2024 I Hospital Day: 189     Assessment & Plan  MDD (major depressive disorder), recurrent severe, without psychosis (HCC)  No medications changes at this time, optimize as indicated;  Patient continues to be pending group home placement. Mood at baseline today. Pt denied SSI and SSD, CM cont to work on personal care home placement and assisting pt w/ finances.    Abilify 5 mg daily as mood adjunct   Zoloft 150 mg daily for depression and anxiety  Continue to encourage participation in group therapy, milieu therapy and occupational therapy.  Continue to assess for side effects of medications.  Continue collaboration with [facility] for medical co-morbidities as indicated.  Continue discussion with CM/SW to assist with obtaining collateral, disposition planning, and the implementation of patient-centered individualized plan of care.  Continue frequent safety checks and vitals per unit protocol.    Risks, benefits and possible side effects of Medications: Risks, benefits, and possible side effects of medications have previously been explained. No new medications at this time.      Legal status: 201    Disposition: to be determined, pending SOAR application for potential group home placement. OT Cognitive Evaluation completed: \"Pt would benefit from discharge to a supportive environment that can provide checks for safety and compliance with IADLs. \"   Although patient's mood has stabilized, they are currently awaiting group home placement.  Their ability to recognize safety hazards take medications and participate in IADLs are significantly impaired by their cognitive deficits compounded by their chronic mental health needs and would be at risk for significant decompensation without the structure and support of a group home setting.      No associated orders from " this encounter found during lookback period of 72 hours.  Autism spectrum disorder  Continue supportive care    No associated orders from this encounter found during lookback period of 72 hours.        Plan     Recommended Treatment:    - Encourage early mobility and having a structured day  - Provide frequent re-orientation, and cognitive stimulation  - Ensure assistive devices are in proper working order (eye-glasses, hearing aids)  - Encourage adequate hydration, nutrition and monitor bowel movements  - Maintain sleep-wake cycle: Uninterrupted sleep time; low-level lighting at night  - Fall precaution  - f/u SLIM recs regarding the medical problems   - Continue medication titration and treatment plan; adjust medication to optimize treatment response and as clinically indicated. .   - Observation: routine            - VS: as per unit protocol  - Diet: Regular diet  - Encourage group attendance and milieu therapy  - Dispo: To be determined     Scheduled medications:  Current Facility-Administered Medications   Medication Dose Route Frequency Provider Last Rate    acetaminophen  650 mg Oral Q6H PRN Basilio Brown MD      acetaminophen  650 mg Oral Q4H PRN Basilio Brown MD      acetaminophen  975 mg Oral Q6H PRN Basilio Brown MD      aluminum-magnesium hydroxide-simethicone  30 mL Oral Q4H PRN Basilio Brown MD      ARIPiprazole  5 mg Oral Daily Basilio Panda MD      Artificial Tears  1 drop Both Eyes Q3H PRN Basilio Brown MD      atorvastatin  10 mg Oral Daily With Dinner WALLY Baptiste      benztropine  1 mg Intramuscular Q4H PRN Max 6/day Basilio Brown MD      benztropine  1 mg Oral Q4H PRN Max 6/day Basilio Brown MD      Cholecalciferol  2,500 Units Oral Daily Dustin Mcallister MD      cyanocobalamin  1,000 mcg Oral Daily WALLY Baptiste      Diclofenac Sodium  2 g Topical 4x Daily PRN WALLY Baptiste      hydrOXYzine HCL  50 mg  Oral Q6H PRN Max 4/day Basilio Brown MD      Or    diphenhydrAMINE  50 mg Intramuscular Q6H PRN Basilio Brown MD      diphenhydrAMINE-zinc acetate   Topical Daily PRN Raj Guerrero MD      famotidine  20 mg Oral BID Laura WALLY Olmos      hydrOXYzine HCL  100 mg Oral Q6H PRN Max 4/day Basilio Brown MD      Or    LORazepam  2 mg Intramuscular Q6H PRN Basilio Brown MD      hydrOXYzine HCL  25 mg Oral Q6H PRN Max 4/day Basilio Brown MD      levothyroxine  37.5 mcg Oral Early Morning WALLY Baptiste      melatonin  6 mg Oral HS PRN WALLY Gomez      methocarbamol  500 mg Oral Q6H PRN WALLY Baptiste      OLANZapine  5 mg Oral Q4H PRN Max 3/day Basilio Brown MD      Or    OLANZapine  2.5 mg Intramuscular Q4H PRN Max 3/day Basilio Brown MD      OLANZapine  5 mg Oral Q3H PRN Max 3/day Basilio Brown MD      Or    OLANZapine  5 mg Intramuscular Q3H PRN Max 3/day Basilio Brown MD      OLANZapine  2.5 mg Oral Q4H PRN Max 6/day Basilio Brown MD      polyethylene glycol  17 g Oral Daily PRN Basilio Brown MD      propranolol  10 mg Oral Q8H PRN Basilio Brown MD      senna-docusate sodium  1 tablet Oral Daily PRN Basilio Brown MD      sertraline  150 mg Oral Daily WALLY Gomez      traZODone  50 mg Oral HS PRN Basilio Brown MD        PRN:    acetaminophen    acetaminophen    acetaminophen    aluminum-magnesium hydroxide-simethicone    Artificial Tears    benztropine    benztropine    Diclofenac Sodium    hydrOXYzine HCL **OR** diphenhydrAMINE    diphenhydrAMINE-zinc acetate    hydrOXYzine HCL **OR** LORazepam    hydrOXYzine HCL    melatonin    methocarbamol    OLANZapine **OR** OLANZapine    OLANZapine **OR** OLANZapine    OLANZapine    polyethylene glycol    propranolol    senna-docusate sodium    traZODone       Subjective     Patient was visited on unit for continuing care; chart  "reviewed and discussed with multidisciplinary treatment team.  On approach, the patient was calm and cooperative. Denied any changes in mood, appetite, and energy level. No problem initiating and maintaining sleep.  Denied A/VH currently.  Denied SI/HI, intent or plan upon direct inquiry at this time. Remains hopeful regarding placement.    Patient continues to be visible in the milieu and interacts with staff and peers. No reports of aggression or self-injurious behavior on unit. No PRN medications used in the past 24 hours.    Patient accepted all offered medications and no adverse effects of medications noted or reported.    Objective    Current Mental Status Evaluation:  Mental Status Exam  Appearance: casually dressed, consistent with stated age  Motor: no psychomotor retardation, no gait abnormalities  Behavior: cooperative, answers questions appropriately  Speech: soft, normal rhythm  Mood: \"better today\"  Affect: constricted, less depressed-appearing  Thought Process: linear and goal-oriented  Thought Content: denies delusions  Risk Potential: denies suicidal ideation, plan, or intent. Denies homicidal ideation  Perceptions: denies auditory hallucinations, denies visual hallucinations,   Sensorium: Oriented to person, place, time, and situation  Cognition: cognitive ability appears intact but was not quantitatively tested  Consciousness: alert and awake  Attention: intact, able to focus without difficulty  Insight: fair  Judgement: limited            Vital signs in last 24 hours:    Temp:  [97 °F (36.1 °C)-97.6 °F (36.4 °C)] 97 °F (36.1 °C)  HR:  [64-77] 64  BP: (119-129)/(68-74) 119/74  Resp:  [17] 17  SpO2:  [95 %-100 %] 95 %  O2 Device: None (Room air)    Psychiatric Review of Systems:  Medication adverse effects: none  Sleep: unchanged  Appetite: unchanged  Behaviors over the past 24 hours: unchanged    Laboratory results:    I have personally reviewed all pertinent laboratory/tests results  No results " found for this or any previous visit (from the past 48 hours).       Progress Toward Goals & Illness Status:   progressing, mood is stabilizing, placement pending    Patient is not at goal. They are not yet ready for discharge. The patient's condition currently requires active psychopharmacological medication management, interdisciplinary coordination with case management, and the utilization of adjunctive milieu and group therapy to augment psychopharmacological efficacy. The patient's risk of morbidity, and progression or decompensation of psychiatric disease, is higher without this current treatment.     Next of Kin  Extended Emergency Contact Information  Primary Emergency Contact: Lois Roberson  Address: [address] [address]           [address] [place], [place] [id number] [place] [address]  Home Phone: [phone]  Relation: Mother    Counseling / Coordination of Care  Patient's progress discussed with staff in treatment team meeting.  Medications, treatment progress and treatment plan reviewed with patient.  Medication education provided to patient.  Educated on importance of medication and treatment compliance.  Supportive therapy provided to patient.  Cognitive techniques utilized during the session.  Reassurance and supportive therapy provided.  Reoriented to reality and reassured.  Encouraged participation in milieu and group therapy on the unit.  Crisis/safety plan discussed with patient.       Dustin Mcallister MD  Attending Psychiatrist   [facility]      This note has been constructed using a voice recognition system. There may be translation, syntax, or grammatical errors. If you have any questions, please contact the dictating provider.

## 2024-11-19 NOTE — NURSING NOTE
"Pt is isolative to self, calm and cooperative upon approach. Sporadically visible in the milieu. Medication and meal compliant, pt was eating in the dark, this RN asked why and he stated that: \"there was enough light from the hallway.\" Denies SI, HI, AH, and VH stating they are in \"a better mood than yesterday.\"   "

## 2024-11-20 PROCEDURE — 99232 SBSQ HOSP IP/OBS MODERATE 35: CPT | Performed by: PSYCHIATRY & NEUROLOGY

## 2024-11-20 RX ADMIN — CYANOCOBALAMIN TAB 1000 MCG 1000 MCG: 1000 TAB at 08:23

## 2024-11-20 RX ADMIN — SERTRALINE HYDROCHLORIDE 150 MG: 100 TABLET ORAL at 08:23

## 2024-11-20 RX ADMIN — LEVOTHYROXINE SODIUM 37.5 MCG: 125 TABLET ORAL at 06:07

## 2024-11-20 RX ADMIN — FAMOTIDINE 20 MG: 20 TABLET ORAL at 17:04

## 2024-11-20 RX ADMIN — ARIPIPRAZOLE 5 MG: 5 TABLET ORAL at 08:23

## 2024-11-20 RX ADMIN — Medication 2500 UNITS: at 08:23

## 2024-11-20 RX ADMIN — FAMOTIDINE 20 MG: 20 TABLET ORAL at 08:23

## 2024-11-20 RX ADMIN — ATORVASTATIN CALCIUM 10 MG: 10 TABLET, FILM COATED ORAL at 17:04

## 2024-11-20 NOTE — PROGRESS NOTES
11/20/24 0846   Team Meeting   Meeting Type Daily Rounds   Team Members Present   Team Members Present Physician;Nurse;   Physician Team Member Ary   [facility] Suhas   Patient/Family Present   Patient Present No   Patient's Family Present No     Pt med/meal compliant. Visible on the unit. Pleasant, calm, cooperative. Discharge pending placement.

## 2024-11-20 NOTE — NURSING NOTE
Patient is calm and cooperative, isolative to self, brightens on approach.  Denies SI/HI/AVH and pain. Medication and meal compliant. Denies any current unmet needs.

## 2024-11-20 NOTE — PLAN OF CARE
Pt attends groups fairly regularly and has been participating more this week. Pt reports he feels he is having a good week.

## 2024-11-20 NOTE — ASSESSMENT & PLAN NOTE
"No medications changes at this time, optimize as indicated;  Patient continues to be pending group home placement. Mood doing well today. Pt denied SSI and SSD, CM cont to work on personal care home placement and assisting pt w/ finances. Working on getting money in [facility] accout    Abilify 5 mg daily as mood adjunct   Zoloft 150 mg daily for depression and anxiety  Continue to encourage participation in group therapy, milieu therapy and occupational therapy.  Continue to assess for side effects of medications.  Continue collaboration with PATRICIA for medical co-morbidities as indicated.  Continue discussion with CM/SW to assist with obtaining collateral, disposition planning, and the implementation of patient-centered individualized plan of care.  Continue frequent safety checks and vitals per unit protocol.    Risks, benefits and possible side effects of Medications: Risks, benefits, and possible side effects of medications have previously been explained. No new medications at this time.      Legal status: 201    Disposition: to be determined, pending SOAR application for potential group home placement. OT Cognitive Evaluation completed: \"Pt would benefit from discharge to a supportive environment that can provide checks for safety and compliance with IADLs. \"   Although patient's mood has stabilized, they are currently awaiting group home placement.  Their ability to recognize safety hazards take medications and participate in IADLs are significantly impaired by their cognitive deficits compounded by their chronic mental health needs and would be at risk for significant decompensation without the structure and support of a group home setting.      No associated orders from this encounter found during lookback period of 72 hours.  "

## 2024-11-20 NOTE — PLAN OF CARE
Pt seen for first session of individual therapy. Pt identified wanting to explore past and present self. Current goal set is grief processing.

## 2024-11-20 NOTE — PLAN OF CARE
Problem: Ineffective Coping  Goal: Participates in unit activities  Description: Interventions:  - Provide therapeutic environment   - Provide required programming   - Redirect inappropriate behaviors   Outcome: Progressing     Problem: Depression  Goal: Treatment Goal: Demonstrate behavioral control of depressive symptoms, verbalize feelings of improved mood/affect, and adopt new coping skills prior to discharge  Outcome: Progressing  Goal: Verbalize thoughts and feelings  Description: Interventions:  - Assess and re-assess patient's level of risk   - Engage patient in 1:1 interactions, daily, for a minimum of 15 minutes   - Encourage patient to express feelings, fears, frustrations, hopes   Outcome: Progressing  Goal: Refrain from harming self  Description: Interventions:  - Monitor patient closely, per order   - Supervise medication ingestion, monitor effects and side effects   Outcome: Progressing  Goal: Refrain from isolation  Description: Interventions:  - Develop a trusting relationship   - Encourage socialization   Outcome: Progressing     Problem: Anxiety  Goal: Anxiety is at manageable level  Description: Interventions:  - Assess and monitor patient's anxiety level.   - Monitor for signs and symptoms (heart palpitations, chest pain, shortness of breath, headaches, nausea, feeling jumpy, restlessness, irritable, apprehensive).   - Collaborate with interdisciplinary team and initiate plan and interventions as ordered.  - [place] patient to unit/surroundings  - Explain treatment plan  - Encourage participation in care  - Encourage verbalization of concerns/fears  - Identify coping mechanisms  - Assist in developing anxiety-reducing skills  - Administer/offer alternative therapies  - Limit or eliminate stimulants  Outcome: Progressing     Problem: DISCHARGE PLANNING - CARE MANAGEMENT  Goal: Discharge to post-acute care or home with appropriate resources  Description: INTERVENTIONS:  - Conduct assessment to  determine patient/family and health care team treatment goals, and need for post-acute services based on payer coverage, community resources, and patient preferences, and barriers to discharge  - Address psychosocial, clinical, and financial barriers to discharge as identified in assessment in conjunction with the patient/family and health care team  - Arrange appropriate level of post-acute services according to patient’s   needs and preference and payer coverage in collaboration with the physician and health care team  - Communicate with and update the patient/family, physician, and health care team regarding progress on the discharge plan  - Arrange appropriate transportation to post-acute venues  Outcome: Progressing     Problem: Knowledge Deficit  Goal: Patient/family/caregiver demonstrates understanding of disease process, treatment plan, medications, and discharge instructions  Description: Complete learning assessment and assess knowledge base.  Interventions:  - Provide teaching at level of understanding  - Provide teaching via preferred learning methods  Outcome: Progressing     Problem: SLEEP DISTURBANCE  Goal: Will exhibit normal sleeping pattern  Description: Interventions:  -  Assess the patients sleep pattern, noting recent changes  - Administer medication as ordered  - Decrease environmental stimuli, including noise, as appropriate during the night  - Encourage the patient to actively participate in unit groups and or exercise during the day to enhance ability to achieve adequate sleep at night  - Assess the patient, in the morning, encouraging a description of sleep experience  Outcome: Progressing

## 2024-11-20 NOTE — NURSING NOTE
"Pt visible throughout evening, quiet but social when approached. Pt has depressed affect, but states, \"I had a good day today.\" Pt denies current unmet needs. Pt visible in dayroom. ADL's encouraged by nurse, pt defers politely. Pt denies SI/HI/AH/VH.   "

## 2024-11-20 NOTE — PROGRESS NOTES
"Progress Note - Behavioral Health   Name: Franco Roberson 39 y.o. male I MRN: [id number]  Unit/Bed#: -02 I Date of Admission: 5/14/2024   Date of Service: 11/20/2024 I Hospital Day: 190     Assessment & Plan  MDD (major depressive disorder), recurrent severe, without psychosis (HCC)  No medications changes at this time, optimize as indicated;  Patient continues to be pending group home placement. Mood doing well today. Pt denied SSI and SSD, CM cont to work on personal care home placement and assisting pt w/ finances. Working on getting money in [employer] accout    Abilify 5 mg daily as mood adjunct   Zoloft 150 mg daily for depression and anxiety  Continue to encourage participation in group therapy, milieu therapy and occupational therapy.  Continue to assess for side effects of medications.  Continue collaboration with PATRICIA for medical co-morbidities as indicated.  Continue discussion with CM/SW to assist with obtaining collateral, disposition planning, and the implementation of patient-centered individualized plan of care.  Continue frequent safety checks and vitals per unit protocol.    Risks, benefits and possible side effects of Medications: Risks, benefits, and possible side effects of medications have previously been explained. No new medications at this time.      Legal status: 201    Disposition: to be determined, pending SOAR application for potential group home placement. OT Cognitive Evaluation completed: \"Pt would benefit from discharge to a supportive environment that can provide checks for safety and compliance with IADLs. \"   Although patient's mood has stabilized, they are currently awaiting group home placement.  Their ability to recognize safety hazards take medications and participate in IADLs are significantly impaired by their cognitive deficits compounded by their chronic mental health needs and would be at risk for significant decompensation without the structure and support of a group home " setting.      No associated orders from this encounter found during lookback period of 72 hours.  Autism spectrum disorder  Continue supportive care    No associated orders from this encounter found during lookback period of 72 hours.        Plan     Recommended Treatment:    - Encourage early mobility and having a structured day  - Provide frequent re-orientation, and cognitive stimulation  - Ensure assistive devices are in proper working order (eye-glasses, hearing aids)  - Encourage adequate hydration, nutrition and monitor bowel movements  - Maintain sleep-wake cycle: Uninterrupted sleep time; low-level lighting at night  - Fall precaution  - f/u SLIM recs regarding the medical problems   - Continue medication titration and treatment plan; adjust medication to optimize treatment response and as clinically indicated. .   - Observation: routine            - VS: as per unit protocol  - Diet: Regular diet  - Encourage group attendance and milieu therapy  - Dispo: To be determined     Scheduled medications:  Current Facility-Administered Medications   Medication Dose Route Frequency Provider Last Rate    acetaminophen  650 mg Oral Q6H PRN Basilio Brown MD      acetaminophen  650 mg Oral Q4H PRN Basilio Brown MD      acetaminophen  975 mg Oral Q6H PRN Basilio Brown MD      aluminum-magnesium hydroxide-simethicone  30 mL Oral Q4H PRN Basilio Brown MD      ARIPiprazole  5 mg Oral Daily Basilio Panda MD      Artificial Tears  1 drop Both Eyes Q3H PRN Basilio Brown MD      atorvastatin  10 mg Oral Daily With Dinner WALLY Baptiste      benztropine  1 mg Intramuscular Q4H PRN Max 6/day Basilio Brown MD      benztropine  1 mg Oral Q4H PRN Max 6/day Basilio Brown MD      Cholecalciferol  2,500 Units Oral Daily Dustin Mcallister MD      cyanocobalamin  1,000 mcg Oral Daily WALLY Baptiste      Diclofenac Sodium  2 g Topical 4x Daily PRN Laura Clark  WALLY Ashford      hydrOXYzine HCL  50 mg Oral Q6H PRN Max 4/day Basilio Brown MD      Or    diphenhydrAMINE  50 mg Intramuscular Q6H PRN Basilio Brown MD      diphenhydrAMINE-zinc acetate   Topical Daily PRN Raj Guerrero MD      famotidine  20 mg Oral BID WALLY Baptiste      hydrOXYzine HCL  100 mg Oral Q6H PRN Max 4/day Basilio Brown MD      Or    LORazepam  2 mg Intramuscular Q6H PRN Basilio Brown MD      hydrOXYzine HCL  25 mg Oral Q6H PRN Max 4/day Basilio Brown MD      levothyroxine  37.5 mcg Oral Early Morning WALLY Baptiste      melatonin  6 mg Oral HS PRN WALLY Gomez      methocarbamol  500 mg Oral Q6H PRN WALLY Baptiste      OLANZapine  5 mg Oral Q4H PRN Max 3/day Basilio Brown MD      Or    OLANZapine  2.5 mg Intramuscular Q4H PRN Max 3/day Basilio Brown MD      OLANZapine  5 mg Oral Q3H PRN Max 3/day Basilio Brown MD      Or    OLANZapine  5 mg Intramuscular Q3H PRN Max 3/day Basilio Brown MD      OLANZapine  2.5 mg Oral Q4H PRN Max 6/day Basilio Brown MD      polyethylene glycol  17 g Oral Daily PRN Basilio Brown MD      propranolol  10 mg Oral Q8H PRN Basilio Brown MD      senna-docusate sodium  1 tablet Oral Daily PRN Basilio Brown MD      sertraline  150 mg Oral Daily WALLY Gomez      traZODone  50 mg Oral HS PRN Basilio Brown MD        PRN:    acetaminophen    acetaminophen    acetaminophen    aluminum-magnesium hydroxide-simethicone    Artificial Tears    benztropine    benztropine    Diclofenac Sodium    hydrOXYzine HCL **OR** diphenhydrAMINE    diphenhydrAMINE-zinc acetate    hydrOXYzine HCL **OR** LORazepam    hydrOXYzine HCL    melatonin    methocarbamol    OLANZapine **OR** OLANZapine    OLANZapine **OR** OLANZapine    OLANZapine    polyethylene glycol    propranolol    senna-docusate sodium    traZODone       Subjective     Patient was  "visited on unit for continuing care; chart reviewed and discussed with multidisciplinary treatment team.  On approach, the patient was calm and cooperative. Denied any changes in mood, appetite, and energy level.  Participating more in group.  No acute complaints or concerns.  Continuing to work with case management regarding disposition. No problem initiating and maintaining sleep.  Denied A/VH currently.  Denied SI/HI, intent or plan upon direct inquiry at this time.    Patient continues to be visible in the milieu and interacts with staff and peers. No reports of aggression or self-injurious behavior on unit. No PRN medications used in the past 24 hours.    Patient accepted all offered medications and no adverse effects of medications noted or reported.    Objective    Current Mental Status Evaluation:  Appearance: casually dressed, appears consistent with stated age  Motor: no psychomotor disturbances, no gait abnormalities  Behavior: cooperative, interacts with this writer appropriately  Speech: normal rate, rhythm, and volume  Mood: \"good\"  Affect: euthymic, somewhat blunted  Thought Process: linear, goal-directed  Thought Content:  denies delusions and paranoia  Perception: denies auditory hallucinations, denies visual hallucinations,  Risk Potential: denies suicidal ideation, plan, or intent. Denies homicidal ideation  Sensorium: Oriented to person, place, time, and situation  Cognition: cognitive ability appears intact but was not quantitatively tested  Consciousness: alert and awake  Attention: able to focus without difficulty  Insight: limited  Judgement: limited            Vital signs in last 24 hours:    Temp:  [96.4 °F (35.8 °C)-97 °F (36.1 °C)] 96.4 °F (35.8 °C)  HR:  [59-76] 59  BP: (117-138)/(67-89) 138/89  Resp:  [16] 16  SpO2:  [96 %-98 %] 98 %  O2 Device: None (Room air)    Psychiatric Review of Systems:  Medication adverse effects: none  Sleep: unchanged  Appetite: unchanged  Behaviors over the " past 24 hours: unchanged    Laboratory results:    I have personally reviewed all pertinent laboratory/tests results  No results found for this or any previous visit (from the past 48 hours).       Progress Toward Goals & Illness Status:   progressing, attends groups, participates in milieu therapy, mood is stabilizing    Patient is not at goal. They are not yet ready for discharge. The patient's condition currently requires active psychopharmacological medication management, interdisciplinary coordination with case management, and the utilization of adjunctive milieu and group therapy to augment psychopharmacological efficacy. The patient's risk of morbidity, and progression or decompensation of psychiatric disease, is higher without this current treatment.     Next of Kin  Extended Emergency Contact Information  Primary Emergency Contact: Lois Roberson  Address: [address] [place] [address]  Home Phone: [phone]  Relation: Mother    Counseling / Coordination of Care  Patient's progress discussed with staff in treatment team meeting.  Medications, treatment progress and treatment plan reviewed with patient.  Medication education provided to patient.  Educated on importance of medication and treatment compliance.  Supportive therapy provided to patient.  Cognitive techniques utilized during the session.  Reassurance and supportive therapy provided.  Reoriented to reality and reassured.  Encouraged participation in milieu and group therapy on the unit.  Crisis/safety plan discussed with patient.       Dustin Mcallister MD  Attending Psychiatrist   [facility]      This note has been constructed using a voice recognition system. There may be translation, syntax, or grammatical errors. If you have any questions, please contact the dictating provider.

## 2024-11-20 NOTE — NURSING NOTE
Patient has been seclusive to his room but out for meals and medications.  Patient is cooperative and pleasant.  He denies SI/HI and A/V hallucinations.

## 2024-11-21 PROCEDURE — 99232 SBSQ HOSP IP/OBS MODERATE 35: CPT | Performed by: PSYCHIATRY & NEUROLOGY

## 2024-11-21 RX ADMIN — ARIPIPRAZOLE 5 MG: 5 TABLET ORAL at 08:21

## 2024-11-21 RX ADMIN — ATORVASTATIN CALCIUM 10 MG: 10 TABLET, FILM COATED ORAL at 17:01

## 2024-11-21 RX ADMIN — FAMOTIDINE 20 MG: 20 TABLET ORAL at 17:01

## 2024-11-21 RX ADMIN — FAMOTIDINE 20 MG: 20 TABLET ORAL at 08:21

## 2024-11-21 RX ADMIN — CYANOCOBALAMIN TAB 1000 MCG 1000 MCG: 1000 TAB at 08:22

## 2024-11-21 RX ADMIN — Medication 2500 UNITS: at 08:21

## 2024-11-21 RX ADMIN — LEVOTHYROXINE SODIUM 37.5 MCG: 125 TABLET ORAL at 06:51

## 2024-11-21 RX ADMIN — SERTRALINE HYDROCHLORIDE 150 MG: 100 TABLET ORAL at 08:22

## 2024-11-21 NOTE — PROGRESS NOTES
11/21/24 2675   Team Meeting   Meeting Type Daily Rounds   Team Members Present   Team Members Present Physician;Nurse;   Physician Team Member Ary   [facility]   Care Management Team Member Noa   Patient/Family Present   Patient Present No   Patient's Family Present No     Pt is medication and meal compliant. Pt denies SI/HI/AVH. Pt is waiting placement.

## 2024-11-21 NOTE — NURSING NOTE
Pt withdrawn to room and self. Encouraged to attend groups and participate in the milieu but pt declines. States he is doing well but appears very depressed. Denies any unmet needs or complaints at this time.

## 2024-11-21 NOTE — ASSESSMENT & PLAN NOTE
"No medications changes at this time, optimize as indicated;  Patient continues to be pending group home placement. Mood doing well today. Pt denied SSI and SSD, CM cont to work on personal care home placement and assisting pt w/ finances. Working on getting money in [employer] account.    Abilify 5 mg daily as mood adjunct   Zoloft 150 mg daily for depression and anxiety  Continue to encourage participation in group therapy, milieu therapy and occupational therapy.  Continue to assess for side effects of medications.  Continue collaboration with PATRICIA for medical co-morbidities as indicated.  Continue discussion with CM/SW to assist with obtaining collateral, disposition planning, and the implementation of patient-centered individualized plan of care.  Continue frequent safety checks and vitals per unit protocol.    Risks, benefits and possible side effects of Medications: Risks, benefits, and possible side effects of medications have previously been explained. No new medications at this time.      Legal status: 201    Disposition: to be determined, pending SOAR application for potential group home placement. OT Cognitive Evaluation completed: \"Pt would benefit from discharge to a supportive environment that can provide checks for safety and compliance with IADLs. \"   Although patient's mood has stabilized, they are currently awaiting group home placement.  Their ability to recognize safety hazards take medications and participate in IADLs are significantly impaired by their cognitive deficits compounded by their chronic mental health needs and would be at risk for significant decompensation without the structure and support of a group home setting.      No associated orders from this encounter found during lookback period of 72 hours.  "

## 2024-11-21 NOTE — PROGRESS NOTES
"Progress Note - Behavioral Health   Name: Franco Roberson 39 y.o. male I MRN: [id number]  Unit/Bed#: -02 I Date of Admission: 5/14/2024   Date of Service: 11/21/2024 I Hospital Day: 191     Assessment & Plan  MDD (major depressive disorder), recurrent severe, without psychosis (HCC)  No medications changes at this time, optimize as indicated;  Patient continues to be pending group home placement. Mood doing well today. Pt denied SSI and SSD, CM cont to work on personal care home placement and assisting pt w/ finances. Working on getting money in [employer] account.    Abilify 5 mg daily as mood adjunct   Zoloft 150 mg daily for depression and anxiety  Continue to encourage participation in group therapy, milieu therapy and occupational therapy.  Continue to assess for side effects of medications.  Continue collaboration with PATRICIA for medical co-morbidities as indicated.  Continue discussion with CM/SW to assist with obtaining collateral, disposition planning, and the implementation of patient-centered individualized plan of care.  Continue frequent safety checks and vitals per unit protocol.    Risks, benefits and possible side effects of Medications: Risks, benefits, and possible side effects of medications have previously been explained. No new medications at this time.      Legal status: 201    Disposition: to be determined, pending SOAR application for potential group home placement. OT Cognitive Evaluation completed: \"Pt would benefit from discharge to a supportive environment that can provide checks for safety and compliance with IADLs. \"   Although patient's mood has stabilized, they are currently awaiting group home placement.  Their ability to recognize safety hazards take medications and participate in IADLs are significantly impaired by their cognitive deficits compounded by their chronic mental health needs and would be at risk for significant decompensation without the structure and support of a group home " setting.      No associated orders from this encounter found during lookback period of 72 hours.  Autism spectrum disorder  Continue supportive care    No associated orders from this encounter found during lookback period of 72 hours.        Plan     Recommended Treatment:    - Encourage early mobility and having a structured day  - Provide frequent re-orientation, and cognitive stimulation  - Ensure assistive devices are in proper working order (eye-glasses, hearing aids)  - Encourage adequate hydration, nutrition and monitor bowel movements  - Maintain sleep-wake cycle: Uninterrupted sleep time; low-level lighting at night  - Fall precaution  - f/u SLIM recs regarding the medical problems   - Continue medication titration and treatment plan; adjust medication to optimize treatment response and as clinically indicated. .   - Observation: routine            - VS: as per unit protocol  - Diet: Regular diet  - Encourage group attendance and milieu therapy  - Dispo: To be determined     Scheduled medications:  Current Facility-Administered Medications   Medication Dose Route Frequency Provider Last Rate    acetaminophen  650 mg Oral Q6H PRN Basilio Brown MD      acetaminophen  650 mg Oral Q4H PRN Basilio Brown MD      acetaminophen  975 mg Oral Q6H PRN Basilio Brown MD      aluminum-magnesium hydroxide-simethicone  30 mL Oral Q4H PRN Basilio Brown MD      ARIPiprazole  5 mg Oral Daily Basilio Panda MD      Artificial Tears  1 drop Both Eyes Q3H PRN Basilio Brown MD      atorvastatin  10 mg Oral Daily With Dinner WALLY Baptiste      benztropine  1 mg Intramuscular Q4H PRN Max 6/day Basilio Brown MD      benztropine  1 mg Oral Q4H PRN Max 6/day Basilio Brown MD      Cholecalciferol  2,500 Units Oral Daily Dustin Mcallister MD      cyanocobalamin  1,000 mcg Oral Daily WALLY Baptiste      Diclofenac Sodium  2 g Topical 4x Daily PRN Laura Clark  WALLY Ashford      hydrOXYzine HCL  50 mg Oral Q6H PRN Max 4/day Basilio Brown MD      Or    diphenhydrAMINE  50 mg Intramuscular Q6H PRN Basilio Brown MD      diphenhydrAMINE-zinc acetate   Topical Daily PRN Raj Guerrero MD      famotidine  20 mg Oral BID WALLY Baptiste      hydrOXYzine HCL  100 mg Oral Q6H PRN Max 4/day Basilio Brown MD      Or    LORazepam  2 mg Intramuscular Q6H PRN Basilio Brown MD      hydrOXYzine HCL  25 mg Oral Q6H PRN Max 4/day Basilio Brown MD      levothyroxine  37.5 mcg Oral Early Morning WALLY Baptiste      melatonin  6 mg Oral HS PRN WALLY Gomez      methocarbamol  500 mg Oral Q6H PRN WALLY Baptiste      OLANZapine  5 mg Oral Q4H PRN Max 3/day Basilio Brown MD      Or    OLANZapine  2.5 mg Intramuscular Q4H PRN Max 3/day Basilio Brown MD      OLANZapine  5 mg Oral Q3H PRN Max 3/day Basilio Brown MD      Or    OLANZapine  5 mg Intramuscular Q3H PRN Max 3/day Basilio Brown MD      OLANZapine  2.5 mg Oral Q4H PRN Max 6/day Basilio Brown MD      polyethylene glycol  17 g Oral Daily PRN Basilio Brown MD      propranolol  10 mg Oral Q8H PRN Basilio Brown MD      senna-docusate sodium  1 tablet Oral Daily PRN Basilio Brown MD      sertraline  150 mg Oral Daily WALLY Gomez      traZODone  50 mg Oral HS PRN Basilio Brown MD        PRN:    acetaminophen    acetaminophen    acetaminophen    aluminum-magnesium hydroxide-simethicone    Artificial Tears    benztropine    benztropine    Diclofenac Sodium    hydrOXYzine HCL **OR** diphenhydrAMINE    diphenhydrAMINE-zinc acetate    hydrOXYzine HCL **OR** LORazepam    hydrOXYzine HCL    melatonin    methocarbamol    OLANZapine **OR** OLANZapine    OLANZapine **OR** OLANZapine    OLANZapine    polyethylene glycol    propranolol    senna-docusate sodium    traZODone       Subjective     Patient was  "visited on unit for continuing care; chart reviewed and discussed with multidisciplinary treatment team.  On approach, the patient was calm and cooperative. Denied any changes in mood, appetite, and energy level. No problem initiating and maintaining sleep.  Denied A/VH currently.  Denied SI/HI, intent or plan upon direct inquiry at this time.  Continuing to work with case management and attend groups.    Patient continues to be visible in the milieu and interacts with staff and peers. No reports of aggression or self-injurious behavior on unit. No PRN medications used in the past 24 hours.    Patient accepted all offered medications and no adverse effects of medications noted or reported.    Objective    Current Mental Status Evaluation:  Appearance: casually dressed, consistent with stated age  Motor: no psychomotor retardation, no gait abnormalities  Behavior: cooperative, answers questions appropriately  Speech: soft, normal rhythm  Mood: \"ok\"  Affect: blunted  Thought Process: linear and goal-oriented  Thought Content: denies delusions, no paranoia endorsed  Risk Potential: denies suicidal ideation, plan, or intent. Denies homicidal ideation  Perceptions: denies auditory hallucinations, denies visual hallucinations,   Sensorium: Oriented to person, place, time, and situation  Cognition: cognitive ability appears intact but was not quantitatively tested  Consciousness: alert and awake  Attention: intact, able to focus without difficulty  Insight: limited  Judgement: limited            Vital signs in last 24 hours:    Temp:  [96.7 °F (35.9 °C)] 96.7 °F (35.9 °C)  HR:  [67-90] 67  BP: (125-138)/(59-71) 125/59  Resp:  [17] 17  SpO2:  [97 %-99 %] 97 %  O2 Device: None (Room air)    Psychiatric Review of Systems:  Medication adverse effects: none  Sleep: unchanged  Appetite: unchanged  Behaviors over the past 24 hours: unchanged    Laboratory results:    I have personally reviewed all pertinent laboratory/tests " results  No results found for this or any previous visit (from the past 48 hours).       Progress Toward Goals & Illness Status:   progressing, attends groups, participates in milieu therapy, placement pending    Patient is not at goal. They are not yet ready for discharge. The patient's condition currently requires active psychopharmacological medication management, interdisciplinary coordination with case management, and the utilization of adjunctive milieu and group therapy to augment psychopharmacological efficacy. The patient's risk of morbidity, and progression or decompensation of psychiatric disease, is higher without this current treatment.     Next of Kin  Extended Emergency Contact Information  Primary Emergency Contact: Lois Roberson  Address: [address]           [address] [place] [address] [place]  Home Phone: [phone]  Relation: Mother    Counseling / Coordination of Care  Patient's progress discussed with staff in treatment team meeting.  Medications, treatment progress and treatment plan reviewed with patient.  Medication education provided to patient.  Educated on importance of medication and treatment compliance.  Supportive therapy provided to patient.  Cognitive techniques utilized during the session.  Reassurance and supportive therapy provided.  Reoriented to reality and reassured.  Encouraged participation in milieu and group therapy on the unit.  Crisis/safety plan discussed with patient.       Dustin Mcallister MD  Attending Psychiatrist   [facility]      This note has been constructed using a voice recognition system. There may be translation, syntax, or grammatical errors. If you have any questions, please contact the dictating provider.

## 2024-11-21 NOTE — PLAN OF CARE
Problem: Ineffective Coping  Goal: Participates in unit activities  Description: Interventions:  - Provide therapeutic environment   - Provide required programming   - Redirect inappropriate behaviors   Outcome: Progressing     Problem: Depression  Goal: Treatment Goal: Demonstrate behavioral control of depressive symptoms, verbalize feelings of improved mood/affect, and adopt new coping skills prior to discharge  Outcome: Progressing  Goal: Verbalize thoughts and feelings  Description: Interventions:  - Assess and re-assess patient's level of risk   - Engage patient in 1:1 interactions, daily, for a minimum of 15 minutes   - Encourage patient to express feelings, fears, frustrations, hopes   Outcome: Progressing  Goal: Refrain from harming self  Description: Interventions:  - Monitor patient closely, per order   - Supervise medication ingestion, monitor effects and side effects   Outcome: Progressing  Goal: Refrain from isolation  Description: Interventions:  - Develop a trusting relationship   - Encourage socialization   Outcome: Progressing     Problem: Anxiety  Goal: Anxiety is at manageable level  Description: Interventions:  - Assess and monitor patient's anxiety level.   - Monitor for signs and symptoms (heart palpitations, chest pain, shortness of breath, headaches, nausea, feeling jumpy, restlessness, irritable, apprehensive).   - Collaborate with interdisciplinary team and initiate plan and interventions as ordered.  - [place] patient to unit/surroundings  - Explain treatment plan  - Encourage participation in care  - Encourage verbalization of concerns/fears  - Identify coping mechanisms  - Assist in developing anxiety-reducing skills  - Administer/offer alternative therapies  - Limit or eliminate stimulants  Outcome: Progressing     Problem: DISCHARGE PLANNING - CARE MANAGEMENT  Goal: Discharge to post-acute care or home with appropriate resources  Description: INTERVENTIONS:  - Conduct assessment to  determine patient/family and health care team treatment goals, and need for post-acute services based on payer coverage, community resources, and patient preferences, and barriers to discharge  - Address psychosocial, clinical, and financial barriers to discharge as identified in assessment in conjunction with the patient/family and health care team  - Arrange appropriate level of post-acute services according to patient’s   needs and preference and payer coverage in collaboration with the physician and health care team  - Communicate with and update the patient/family, physician, and health care team regarding progress on the discharge plan  - Arrange appropriate transportation to post-acute venues  Outcome: Progressing     Problem: Knowledge Deficit  Goal: Patient/family/caregiver demonstrates understanding of disease process, treatment plan, medications, and discharge instructions  Description: Complete learning assessment and assess knowledge base.  Interventions:  - Provide teaching at level of understanding  - Provide teaching via preferred learning methods  Outcome: Progressing     Problem: SLEEP DISTURBANCE  Goal: Will exhibit normal sleeping pattern  Description: Interventions:  -  Assess the patients sleep pattern, noting recent changes  - Administer medication as ordered  - Decrease environmental stimuli, including noise, as appropriate during the night  - Encourage the patient to actively participate in unit groups and or exercise during the day to enhance ability to achieve adequate sleep at night  - Assess the patient, in the morning, encouraging a description of sleep experience  Outcome: Progressing      individual instruction

## 2024-11-22 PROCEDURE — 99232 SBSQ HOSP IP/OBS MODERATE 35: CPT | Performed by: PSYCHIATRY & NEUROLOGY

## 2024-11-22 RX ADMIN — SERTRALINE HYDROCHLORIDE 150 MG: 100 TABLET ORAL at 09:15

## 2024-11-22 RX ADMIN — ARIPIPRAZOLE 5 MG: 5 TABLET ORAL at 09:14

## 2024-11-22 RX ADMIN — FAMOTIDINE 20 MG: 20 TABLET ORAL at 09:15

## 2024-11-22 RX ADMIN — Medication 2500 UNITS: at 09:15

## 2024-11-22 RX ADMIN — SENNOSIDES AND DOCUSATE SODIUM 1 TABLET: 50; 8.6 TABLET ORAL at 17:45

## 2024-11-22 RX ADMIN — FAMOTIDINE 20 MG: 20 TABLET ORAL at 17:36

## 2024-11-22 RX ADMIN — CYANOCOBALAMIN TAB 1000 MCG 1000 MCG: 1000 TAB at 09:15

## 2024-11-22 RX ADMIN — LEVOTHYROXINE SODIUM 37.5 MCG: 125 TABLET ORAL at 06:01

## 2024-11-22 RX ADMIN — ATORVASTATIN CALCIUM 10 MG: 10 TABLET, FILM COATED ORAL at 17:36

## 2024-11-22 NOTE — NURSING NOTE
Pt c/o of constipation. Given senokot-s 8.6mg-50mg. Patient given education about preventing constipation. Patient reports he had a BM and it was hard, and small. He reports he feels like he has fully emptied his bowels, but reports his has been going on for a period of time.

## 2024-11-22 NOTE — NURSING NOTE
Pt is calm and cooperative.Visible in the milieu walking the halls, mainly isolative to room and self. Denies SI, HI, AH, VH, and pain. No HS medications scheduled. Pt denies unmet needs at this time.

## 2024-11-22 NOTE — PROGRESS NOTES
"Progress Note - Behavioral Health   Name: Franco Roberson 39 y.o. male I MRN: [id number]  Unit/Bed#: -02 I Date of Admission: 5/14/2024   Date of Service: 11/22/2024 I Hospital Day: 192     Assessment & Plan  MDD (major depressive disorder), recurrent severe, without psychosis (HCC)  No medications changes at this time, optimize as indicated;  Patient continues to be pending group home placement. No acute concerns today. Pt denied SSI and SSD, CM cont to work on personal care home placement and assisting pt w/ finances. Working on getting money in ABLE account.    Abilify 5 mg daily as mood adjunct   Zoloft 150 mg daily for depression and anxiety  Continue to encourage participation in group therapy, milieu therapy and occupational therapy.  Continue to assess for side effects of medications.  Continue collaboration with PATRICIA for medical co-morbidities as indicated.  Continue discussion with CM/SW to assist with obtaining collateral, disposition planning, and the implementation of patient-centered individualized plan of care.  Continue frequent safety checks and vitals per unit protocol.    Risks, benefits and possible side effects of Medications: Risks, benefits, and possible side effects of medications have previously been explained. No new medications at this time.      Legal status: 201    Disposition: to be determined, pending SOAR application for potential group home placement. OT Cognitive Evaluation completed: \"Pt would benefit from discharge to a supportive environment that can provide checks for safety and compliance with IADLs. \"   Although patient's mood has stabilized, they are currently awaiting group home placement.  Their ability to recognize safety hazards take medications and participate in IADLs are significantly impaired by their cognitive deficits compounded by their chronic mental health needs and would be at risk for significant decompensation without the structure and support of a group " home setting.      No associated orders from this encounter found during lookback period of 72 hours.  Autism spectrum disorder  Continue supportive care    No associated orders from this encounter found during lookback period of 72 hours.        Plan     Recommended Treatment:    - Encourage early mobility and having a structured day  - Provide frequent re-orientation, and cognitive stimulation  - Ensure assistive devices are in proper working order (eye-glasses, hearing aids)  - Encourage adequate hydration, nutrition and monitor bowel movements  - Maintain sleep-wake cycle: Uninterrupted sleep time; low-level lighting at night  - Fall precaution  - f/u SLIM recs regarding the medical problems   - Continue medication titration and treatment plan; adjust medication to optimize treatment response and as clinically indicated. .   - Observation: routine            - VS: as per unit protocol  - Diet: Regular diet  - Encourage group attendance and milieu therapy  - Dispo: To be determined     Scheduled medications:  Current Facility-Administered Medications   Medication Dose Route Frequency Provider Last Rate    acetaminophen  650 mg Oral Q6H PRN Basilio Brown MD      acetaminophen  650 mg Oral Q4H PRN Basilio Brown MD      acetaminophen  975 mg Oral Q6H PRN Basilio Brown MD      aluminum-magnesium hydroxide-simethicone  30 mL Oral Q4H PRN Basilio Brown MD      ARIPiprazole  5 mg Oral Daily Basilio Panda MD      Artificial Tears  1 drop Both Eyes Q3H PRN Baislio Brown MD      atorvastatin  10 mg Oral Daily With Dinner WALLY Baptiste      benztropine  1 mg Intramuscular Q4H PRN Max 6/day Basilio Brown MD      benztropine  1 mg Oral Q4H PRN Max 6/day Basilio Brown MD      Cholecalciferol  2,500 Units Oral Daily Dustin Mcallister MD      cyanocobalamin  1,000 mcg Oral Daily WALLY Baptiste      Diclofenac Sodium  2 g Topical 4x Daily PRN Laura  WALLY Olmos      hydrOXYzine HCL  50 mg Oral Q6H PRN Max 4/day Basilio Brown MD      Or    diphenhydrAMINE  50 mg Intramuscular Q6H PRN Basilio Brown MD      diphenhydrAMINE-zinc acetate   Topical Daily PRN Raj Guerrero MD      famotidine  20 mg Oral BID WALLY Baptiste      hydrOXYzine HCL  100 mg Oral Q6H PRN Max 4/day Basilio Brown MD      Or    LORazepam  2 mg Intramuscular Q6H PRN Basilio Brown MD      hydrOXYzine HCL  25 mg Oral Q6H PRN Max 4/day Basilio Brown MD      levothyroxine  37.5 mcg Oral Early Morning WALLY Baptiste      melatonin  6 mg Oral HS PRN WALLY Gomez      methocarbamol  500 mg Oral Q6H PRN WALLY Baptiste      OLANZapine  5 mg Oral Q4H PRN Max 3/day Basilio Brown MD      Or    OLANZapine  2.5 mg Intramuscular Q4H PRN Max 3/day Basilio Brown MD      OLANZapine  5 mg Oral Q3H PRN Max 3/day Basilio Brown MD      Or    OLANZapine  5 mg Intramuscular Q3H PRN Max 3/day Basilio Brown MD      OLANZapine  2.5 mg Oral Q4H PRN Max 6/day Basilio Brown MD      polyethylene glycol  17 g Oral Daily PRN Basilio Brown MD      propranolol  10 mg Oral Q8H PRN Basilio Brown MD      senna-docusate sodium  1 tablet Oral Daily PRN Basilio Brown MD      sertraline  150 mg Oral Daily WALLY Gomez      traZODone  50 mg Oral HS PRN Basilio Brown MD        PRN:    acetaminophen    acetaminophen    acetaminophen    aluminum-magnesium hydroxide-simethicone    Artificial Tears    benztropine    benztropine    Diclofenac Sodium    hydrOXYzine HCL **OR** diphenhydrAMINE    diphenhydrAMINE-zinc acetate    hydrOXYzine HCL **OR** LORazepam    hydrOXYzine HCL    melatonin    methocarbamol    OLANZapine **OR** OLANZapine    OLANZapine **OR** OLANZapine    OLANZapine    polyethylene glycol    propranolol    senna-docusate sodium    traZODone       Subjective    "  Patient was visited on unit for continuing care; chart reviewed and discussed with multidisciplinary treatment team.  On approach, the patient was calm and cooperative. Denied any changes in mood, appetite, and energy level.  Stated that it was somewhat difficult to get a sleep last night as they were too rapid response on the unit.  Looking forward to be able to get some more rest this morning.  Otherwise he denies any acute complaints or concerns.  Denied A/VH currently.  Denied SI/HI, intent or plan upon direct inquiry at this time.    Patient continues to be visible in the milieu and interacts with staff and peers. No reports of aggression or self-injurious behavior on unit. No PRN medications used in the past 24 hours.    Patient accepted all offered medications and no adverse effects of medications noted or reported.    Objective    Current Mental Status Evaluation:  Appearance: casually dressed, appears consistent with stated age  Motor: no psychomotor disturbances  Behavior: cooperative, interacts with this writer appropriately  Speech: normal rate, rhythm, and volume  Mood: \"good\"  Affect: euthymic, normal range and intensity  Thought Process: organized, linear, and goal-oriented  Thought Content: denies delusions and paranoia  Perception: denies auditory hallucinations, denies visual hallucinations  Risk Potential: denies suicidal ideation, plan, or intent. Denies homicidal ideation  Sensorium: Oriented to person, place, time, and situation  Cognition: cognitive ability appears intact but was not quantitatively tested  Consciousness: alert and awake  Attention: able to focus without difficulty  Insight: limited  Judgement: limited          Vital signs in last 24 hours:    Temp:  [96.9 °F (36.1 °C)-97.6 °F (36.4 °C)] 97.6 °F (36.4 °C)  HR:  [58-88] 58  BP: (119-126)/(64-69) 126/69  Resp:  [16] 16  SpO2:  [96 %-98 %] 98 %  O2 Device: None (Room air)    Psychiatric Review of Systems:  Medication adverse " effects: none  Sleep: unchanged  Appetite: unchanged  Behaviors over the past 24 hours: unchanged    Laboratory results:    I have personally reviewed all pertinent laboratory/tests results  No results found for this or any previous visit (from the past 48 hours).       Progress Toward Goals & Illness Status:   progressing, mood is stabilizing, discharge planning, placement pending    Patient is not at goal. They are not yet ready for discharge. The patient's condition currently requires active psychopharmacological medication management, interdisciplinary coordination with case management, and the utilization of adjunctive milieu and group therapy to augment psychopharmacological efficacy. The patient's risk of morbidity, and progression or decompensation of psychiatric disease, is higher without this current treatment.     Next of Kin  Extended Emergency Contact Information  Primary Emergency Contact: Losi Roberson  Address: [address] [place] [address] [address]  Home Phone: [phone]  Relation: Mother    Counseling / Coordination of Care  Patient's progress discussed with staff in treatment team meeting.  Medications, treatment progress and treatment plan reviewed with patient.  Medication education provided to patient.  Educated on importance of medication and treatment compliance.  Supportive therapy provided to patient.  Cognitive techniques utilized during the session.  Reassurance and supportive therapy provided.  Reoriented to reality and reassured.  Encouraged participation in milieu and group therapy on the unit.  Crisis/safety plan discussed with patient.       Dustin Mcallister MD  Attending Psychiatrist   [facility]      This note has been constructed using a voice recognition system. There may be translation, syntax, or grammatical errors. If you have any questions, please contact the dictating provider.

## 2024-11-22 NOTE — ASSESSMENT & PLAN NOTE
"No medications changes at this time, optimize as indicated;  Patient continues to be pending group home placement. No acute concerns today. Pt denied SSI and SSD, CM cont to work on personal care home placement and assisting pt w/ finances. Working on getting money in ABLE account.    Abilify 5 mg daily as mood adjunct   Zoloft 150 mg daily for depression and anxiety  Continue to encourage participation in group therapy, milieu therapy and occupational therapy.  Continue to assess for side effects of medications.  Continue collaboration with PATRICIA for medical co-morbidities as indicated.  Continue discussion with CM/SW to assist with obtaining collateral, disposition planning, and the implementation of patient-centered individualized plan of care.  Continue frequent safety checks and vitals per unit protocol.    Risks, benefits and possible side effects of Medications: Risks, benefits, and possible side effects of medications have previously been explained. No new medications at this time.      Legal status: 201    Disposition: to be determined, pending SOAR application for potential group home placement. OT Cognitive Evaluation completed: \"Pt would benefit from discharge to a supportive environment that can provide checks for safety and compliance with IADLs. \"   Although patient's mood has stabilized, they are currently awaiting group home placement.  Their ability to recognize safety hazards take medications and participate in IADLs are significantly impaired by their cognitive deficits compounded by their chronic mental health needs and would be at risk for significant decompensation without the structure and support of a group home setting.      No associated orders from this encounter found during lookback period of 72 hours.  "

## 2024-11-22 NOTE — NURSING NOTE
Patient denies AVH/SI/HI. He reports sleeping well. He is medication compliant. Patient was visible for breakfast, and brightens upon approach. He denies anxiety and depression currently. Patient denies further needs.

## 2024-11-22 NOTE — PROGRESS NOTES
Pt attended co facilitated wellness session with fellow group facilitator. Group was offered Nintendo Switch time or two games (scrabble & suspend). Pt opted to play scrabble with staff and peers. Pt is pleasant and effortful t/o. Pt attends for full duration.

## 2024-11-22 NOTE — PLAN OF CARE
Problem: Ineffective Coping  Goal: Participates in unit activities  Description: Interventions:  - Provide therapeutic environment   - Provide required programming   - Redirect inappropriate behaviors   Outcome: Progressing     Problem: Depression  Goal: Treatment Goal: Demonstrate behavioral control of depressive symptoms, verbalize feelings of improved mood/affect, and adopt new coping skills prior to discharge  Outcome: Progressing  Goal: Verbalize thoughts and feelings  Description: Interventions:  - Assess and re-assess patient's level of risk   - Engage patient in 1:1 interactions, daily, for a minimum of 15 minutes   - Encourage patient to express feelings, fears, frustrations, hopes   Outcome: Progressing  Goal: Refrain from harming self  Description: Interventions:  - Monitor patient closely, per order   - Supervise medication ingestion, monitor effects and side effects   Outcome: Progressing  Goal: Refrain from isolation  Description: Interventions:  - Develop a trusting relationship   - Encourage socialization   Outcome: Progressing     Problem: Anxiety  Goal: Anxiety is at manageable level  Description: Interventions:  - Assess and monitor patient's anxiety level.   - Monitor for signs and symptoms (heart palpitations, chest pain, shortness of breath, headaches, nausea, feeling jumpy, restlessness, irritable, apprehensive).   - Collaborate with interdisciplinary team and initiate plan and interventions as ordered.  - [place] patient to unit/surroundings  - Explain treatment plan  - Encourage participation in care  - Encourage verbalization of concerns/fears  - Identify coping mechanisms  - Assist in developing anxiety-reducing skills  - Administer/offer alternative therapies  - Limit or eliminate stimulants  Outcome: Progressing     Problem: Knowledge Deficit  Goal: Patient/family/caregiver demonstrates understanding of disease process, treatment plan, medications, and discharge  instructions  Description: Complete learning assessment and assess knowledge base.  Interventions:  - Provide teaching at level of understanding  - Provide teaching via preferred learning methods  Outcome: Progressing     Problem: SLEEP DISTURBANCE  Goal: Will exhibit normal sleeping pattern  Description: Interventions:  -  Assess the patients sleep pattern, noting recent changes  - Administer medication as ordered  - Decrease environmental stimuli, including noise, as appropriate during the night  - Encourage the patient to actively participate in unit groups and or exercise during the day to enhance ability to achieve adequate sleep at night  - Assess the patient, in the morning, encouraging a description of sleep experience  Outcome: Progressing

## 2024-11-23 PROCEDURE — 99232 SBSQ HOSP IP/OBS MODERATE 35: CPT | Performed by: PSYCHIATRY & NEUROLOGY

## 2024-11-23 RX ORDER — AMOXICILLIN 250 MG
1 CAPSULE ORAL
Status: DISCONTINUED | OUTPATIENT
Start: 2024-11-23 | End: 2024-11-24

## 2024-11-23 RX ADMIN — LEVOTHYROXINE SODIUM 37.5 MCG: 125 TABLET ORAL at 06:17

## 2024-11-23 RX ADMIN — SERTRALINE HYDROCHLORIDE 150 MG: 100 TABLET ORAL at 09:57

## 2024-11-23 RX ADMIN — FAMOTIDINE 20 MG: 20 TABLET ORAL at 18:13

## 2024-11-23 RX ADMIN — ATORVASTATIN CALCIUM 10 MG: 10 TABLET, FILM COATED ORAL at 18:13

## 2024-11-23 RX ADMIN — FAMOTIDINE 20 MG: 20 TABLET ORAL at 09:56

## 2024-11-23 RX ADMIN — ARIPIPRAZOLE 5 MG: 5 TABLET ORAL at 09:56

## 2024-11-23 RX ADMIN — CYANOCOBALAMIN TAB 1000 MCG 1000 MCG: 1000 TAB at 09:57

## 2024-11-23 RX ADMIN — Medication 2500 UNITS: at 09:56

## 2024-11-23 NOTE — PLAN OF CARE
Problem: Ineffective Coping  Goal: Participates in unit activities  Description: Interventions:  - Provide therapeutic environment   - Provide required programming   - Redirect inappropriate behaviors   Outcome: Progressing     Problem: Depression  Goal: Treatment Goal: Demonstrate behavioral control of depressive symptoms, verbalize feelings of improved mood/affect, and adopt new coping skills prior to discharge  Outcome: Progressing  Goal: Verbalize thoughts and feelings  Description: Interventions:  - Assess and re-assess patient's level of risk   - Engage patient in 1:1 interactions, daily, for a minimum of 15 minutes   - Encourage patient to express feelings, fears, frustrations, hopes   Outcome: Progressing  Goal: Refrain from harming self  Description: Interventions:  - Monitor patient closely, per order   - Supervise medication ingestion, monitor effects and side effects   Outcome: Progressing  Goal: Refrain from isolation  Description: Interventions:  - Develop a trusting relationship   - Encourage socialization   Outcome: Progressing     Problem: Anxiety  Goal: Anxiety is at manageable level  Description: Interventions:  - Assess and monitor patient's anxiety level.   - Monitor for signs and symptoms (heart palpitations, chest pain, shortness of breath, headaches, nausea, feeling jumpy, restlessness, irritable, apprehensive).   - Collaborate with interdisciplinary team and initiate plan and interventions as ordered.  - [place] patient to unit/surroundings  - Explain treatment plan  - Encourage participation in care  - Encourage verbalization of concerns/fears  - Identify coping mechanisms  - Assist in developing anxiety-reducing skills  - Administer/offer alternative therapies  - Limit or eliminate stimulants  Outcome: Progressing     Problem: DISCHARGE PLANNING - CARE MANAGEMENT  Goal: Discharge to post-acute care or home with appropriate resources  Description: INTERVENTIONS:  - Conduct assessment to  determine patient/family and health care team treatment goals, and need for post-acute services based on payer coverage, community resources, and patient preferences, and barriers to discharge  - Address psychosocial, clinical, and financial barriers to discharge as identified in assessment in conjunction with the patient/family and health care team  - Arrange appropriate level of post-acute services according to patient’s   needs and preference and payer coverage in collaboration with the physician and health care team  - Communicate with and update the patient/family, physician, and health care team regarding progress on the discharge plan  - Arrange appropriate transportation to post-acute venues  Outcome: Progressing     Problem: Knowledge Deficit  Goal: Patient/family/caregiver demonstrates understanding of disease process, treatment plan, medications, and discharge instructions  Description: Complete learning assessment and assess knowledge base.  Interventions:  - Provide teaching at level of understanding  - Provide teaching via preferred learning methods  Outcome: Progressing     Problem: SLEEP DISTURBANCE  Goal: Will exhibit normal sleeping pattern  Description: Interventions:  -  Assess the patients sleep pattern, noting recent changes  - Administer medication as ordered  - Decrease environmental stimuli, including noise, as appropriate during the night  - Encourage the patient to actively participate in unit groups and or exercise during the day to enhance ability to achieve adequate sleep at night  - Assess the patient, in the morning, encouraging a description of sleep experience  Outcome: Progressing

## 2024-11-23 NOTE — NURSING NOTE
Per unit protocol 1 hour reassessment for constipation for PRN PO senokot-s 8.6mg-50mg ineffective. Patient has not had reported BM, and thus, cannot assess patient reports for softer stools.

## 2024-11-23 NOTE — NURSING NOTE
"Pt calm and cooperative with staff. Denies any SI/HI/AVH at this time. Pt pleasant in conversation, stated he \"had a good day today\", visualized walking milieu. Pt currently denies any unmet needs. No HS medications scheduled. Q15 minute checks maintained for safety.   "

## 2024-11-23 NOTE — PROGRESS NOTES
"    Psychiatric Progress Note - Department of Behavioral Health   Franco Roberson 39 y.o. male MRN: [id number]  Unit/Bed#: [facility] 347-02 Encounter: [id number]    ASSESSMENT & PLAN     Assessment & Plan  MDD (major depressive disorder), recurrent severe, without psychosis (HCC)  No medications changes at this time, optimize as indicated;  Patient continues to be pending group home placement. No acute concerns today. Pt denied SSI and SSD, CM cont to work on personal care home placement and assisting pt w/ finances. Working on getting money in ABLE account.    Abilify 5 mg daily as mood adjunct   Zoloft 150 mg daily for depression and anxiety  Continue to encourage participation in group therapy, milieu therapy and occupational therapy.  Continue to assess for side effects of medications.  Continue collaboration with PATRICIA for medical co-morbidities as indicated.  Continue discussion with CM/SW to assist with obtaining collateral, disposition planning, and the implementation of patient-centered individualized plan of care.  Continue frequent safety checks and vitals per unit protocol.    Risks, benefits and possible side effects of Medications: Risks, benefits, and possible side effects of medications have previously been explained. No new medications at this time.      Legal status: 201    Disposition: to be determined, pending SOAR application for potential group home placement. OT Cognitive Evaluation completed: \"Pt would benefit from discharge to a supportive environment that can provide checks for safety and compliance with IADLs. \"   Although patient's mood has stabilized, they are currently awaiting group home placement.  Their ability to recognize safety hazards take medications and participate in IADLs are significantly impaired by their cognitive deficits compounded by their chronic mental health needs and would be at risk for significant decompensation without the structure and support of a group home " setting.      No associated orders from this encounter found during lookback period of 72 hours.  Autism spectrum disorder  Continue supportive care    No associated orders from this encounter found during lookback period of 72 hours.      Treatment Recommendations/Precautions:  Continue to promote patient participation in therapeutic milieu.  Continue medical management per medicine.  Continue previously prescribed psychotropic medication regimen; see below.  Continue behavioral health checks q.15 minutes.   Continue vitals per behavioral health unit protocol.  Discharge date per primary team; 201 commitment status.    SUBJECTIVE     Patient evaluated this a.m. for continuity of care. Patient was discussed at length with nursing and treatment team. Per nursing, patient remains calm, cooperative, intermittently interactive in the milieu, adherent to his medications without any acute adverse effects. No acute distress is noted throughout evaluation. Franco Roberson denies suicidal/homicidal ideation in addition to thoughts of self-injury, receptive to crisis planning provided by this writer, contacting for safety in the inpatient setting, admitting to an ability to appropriately confide in staff including this writer.  Patient appears scant, sparse, minimally interactive involving this writer, superficial, denying any/all psychiatric complaint/concerns, somewhat suspicious throughout evaluation    PSYCHIATRIC REVIEW OF SYSTEMS     Behavior over the last 24 hours: Unchanged  Sleep: Adequate  Appetite: Adequate  Medication side effects: None    REVIEW OF SYSTEMS   Review of systems: No complaints    OBJECTIVE     Vital Signs in Past 24 Hours:  Temp:  [96.9 °F (36.1 °C)-97.8 °F (36.6 °C)] 97.8 °F (36.6 °C)  HR:  [65-68] 65  BP: (120-126)/(69-73) 126/73  Resp:  [16] 16  SpO2:  [96 %-97 %] 96 %  O2 Device: None (Room air)    Intake/Output in Past 24 hours:  No intake/output data recorded.  No intake/output data  recorded.        Laboratory Results:  I have personally reviewed all pertinent laboratory/tests results.  Most Recent Labs:   Lab Results   Component Value Date    WBC 6.75 11/12/2024    RBC 5.06 11/12/2024    HGB 15.9 11/12/2024    HCT 46.2 11/12/2024     11/12/2024    RDW 12.8 11/12/2024    NEUTROABS 4.15 11/12/2024    SODIUM 139 11/12/2024    K 4.0 11/12/2024     11/12/2024    CO2 30 11/12/2024    BUN 17 11/12/2024    CREATININE 0.99 11/12/2024    GLUC 85 11/12/2024    GLUF 85 11/12/2024    CALCIUM 9.4 11/12/2024    AST 23 11/12/2024    ALT 32 11/12/2024    ALKPHOS 73 11/12/2024    TP 8.1 11/12/2024    ALB 4.4 11/12/2024    TBILI 0.54 11/12/2024    CHOLESTEROL 168 09/19/2024    HDL 42 09/19/2024    TRIG 137 09/19/2024    LDLCALC 99 09/19/2024    NONHDLC 126 09/19/2024    [id number] 2.532 11/12/2024    FREET4 0.56 (L) 09/10/2024       Behavioral Health Medications: all current active meds have been reviewed and current meds:   Current Facility-Administered Medications:     acetaminophen (TYLENOL) tablet 650 mg, Q6H PRN    acetaminophen (TYLENOL) tablet 650 mg, Q4H PRN    acetaminophen (TYLENOL) tablet 975 mg, Q6H PRN    aluminum-magnesium hydroxide-simethicone (MAALOX) oral suspension 30 mL, Q4H PRN    ARIPiprazole (ABILIFY) tablet 5 mg, Daily    Artificial Tears ophthalmic solution 1 drop, Q3H PRN    atorvastatin (LIPITOR) tablet 10 mg, Daily With Dinner    benztropine (COGENTIN) injection 1 mg, Q4H PRN Max 6/day    benztropine (COGENTIN) tablet 1 mg, Q4H PRN Max 6/day    Cholecalciferol (VITAMIN D3) tablet 2,500 Units, Daily    cyanocobalamin (VITAMIN B-12) tablet 1,000 mcg, Daily    Diclofenac Sodium (VOLTAREN) 1 % topical gel 2 g, 4x Daily PRN    hydrOXYzine HCL (ATARAX) tablet 50 mg, Q6H PRN Max 4/day **OR** diphenhydrAMINE (BENADRYL) injection 50 mg, Q6H PRN    diphenhydrAMINE-zinc acetate (BENADRYL) 2-0.1 % cream, Daily PRN    famotidine (PEPCID) tablet 20 mg, BID    hydrOXYzine HCL (ATARAX)  tablet 100 mg, Q6H PRN Max 4/day **OR** LORazepam (ATIVAN) injection 2 mg, Q6H PRN    hydrOXYzine HCL (ATARAX) tablet 25 mg, Q6H PRN Max 4/day    levothyroxine tablet 37.5 mcg, Early Morning    melatonin tablet 6 mg, HS PRN    methocarbamol (ROBAXIN) tablet 500 mg, Q6H PRN    OLANZapine (ZyPREXA) tablet 5 mg, Q4H PRN Max 3/day **OR** OLANZapine (ZyPREXA) IM injection 2.5 mg, Q4H PRN Max 3/day    OLANZapine (ZyPREXA) tablet 5 mg, Q3H PRN Max 3/day **OR** OLANZapine (ZyPREXA) IM injection 5 mg, Q3H PRN Max 3/day    OLANZapine (ZyPREXA) tablet 2.5 mg, Q4H PRN Max 6/day    polyethylene glycol (MIRALAX) packet 17 g, Daily PRN    propranolol (INDERAL) tablet 10 mg, Q8H PRN    senna-docusate sodium (SENOKOT S) 8.6-50 mg per tablet 1 tablet, HS    sertraline (ZOLOFT) tablet 150 mg, Daily    traZODone (DESYREL) tablet 50 mg, HS PRN.    Risks, benefits and possible side effects of Medications:   Risks, benefits, and possible side effects of medications explained to patient and patient verbalizes understanding.      Mental Status Evaluation:  Appearance:  age appropriate, casually dressed, and somewhat disheveled   Behavior:  psychomotor retardation superficial, somewhat suspicious   Speech:  soft and scant   Mood:  euthymic   Affect:  mood-incongruent and restricted   Language sparse   Thought Process:  goal directed   Thought Content:  Appears paranoid   Perceptual Disturbances: None   Risk Potential: Suicidal Ideations none, Homicidal Ideations none, and Potential for Aggression No   Sensorium:  person, place, and time/date   Cognition:  recent and remote memory grossly intact   Consciousness:  alert and awake    Attention: attention span appeared shorter than expected for age   Insight:  limited   Judgment: limited   Intellect Not assessed   Gait/Station: normal gait/station and normal balance   Motor Activity: no abnormal movements     Memory: Short and long term memory fair     Progress Toward Goals: Unchanged, as  evidenced by their participation (or lack thereof) in individual, social and therapeutic milieu in addition to adherence to their medication regimen.    Recommended Treatment:   See above for assessment and plan.    Inpatient Psychiatric Certification: Based upon physical, mental and social evaluations, I certify that inpatient psychiatric services are medically necessary for this patient for a duration of greater than 2 midnights for the treatment of MDD (major depressive disorder), recurrent severe, without psychosis (HCC) including psychotropic medication management, participation in the therapeutic milieu and referrals as indicated. Available alternative community resources do not meet the patient's mental health care needs. I further attest that an established written individualized plan of care has been implemented and is outlined in the patient's medical records.    Counseling/Coordination of Care    I have expended greater than 15 minutes in which more than 50% of this time was expended in counseling/coordination of patient care relating to diagnostic results, prognosis, potential risks and benefits of management options, instructions for appropriate management, patient and/or collateral education, importance of adherence to management and/or risk factor reductions. Patient's rights, confidentiality, exceptions to confidentiality, use of electronic medical record including appropriate staff access to medical record regarding behavioral health services and consent to treatment were reviewed.    Note Share:     This note was not shared with the patient due to reasonable likelihood of causing patient harm     This note has been constructed using a voice recognition system. There may be translation, syntax,  or grammatical errors. If you have any questions, please contact the dictating provider.    Jordan Christopher Holter, DO 11/23/24

## 2024-11-23 NOTE — NURSING NOTE
Patient was assessed in room during medication administration, patient reports no AVH/SI/HI. He reports sleeping well last night due to having a room to himself and no snoring. Patient has had no behavioral issues to note. He brightens upon approach. He does not know yet if the senokot last night he received as a PRN has helped his constipation/hard stool that he states has been ongoing. Patient has been med compliant. Patient states they have no further needs at this time.

## 2024-11-24 PROCEDURE — 99232 SBSQ HOSP IP/OBS MODERATE 35: CPT | Performed by: PSYCHIATRY & NEUROLOGY

## 2024-11-24 RX ORDER — AMOXICILLIN 250 MG
2 CAPSULE ORAL
Status: DISCONTINUED | OUTPATIENT
Start: 2024-11-24 | End: 2024-11-26

## 2024-11-24 RX ADMIN — ARIPIPRAZOLE 5 MG: 5 TABLET ORAL at 08:23

## 2024-11-24 RX ADMIN — LEVOTHYROXINE SODIUM 37.5 MCG: 125 TABLET ORAL at 06:15

## 2024-11-24 RX ADMIN — SERTRALINE HYDROCHLORIDE 150 MG: 100 TABLET ORAL at 08:24

## 2024-11-24 RX ADMIN — FAMOTIDINE 20 MG: 20 TABLET ORAL at 08:23

## 2024-11-24 RX ADMIN — CYANOCOBALAMIN TAB 1000 MCG 1000 MCG: 1000 TAB at 08:23

## 2024-11-24 RX ADMIN — FAMOTIDINE 20 MG: 20 TABLET ORAL at 17:55

## 2024-11-24 RX ADMIN — SENNOSIDES AND DOCUSATE SODIUM 2 TABLET: 50; 8.6 TABLET ORAL at 17:55

## 2024-11-24 RX ADMIN — Medication 2500 UNITS: at 08:23

## 2024-11-24 RX ADMIN — ATORVASTATIN CALCIUM 10 MG: 10 TABLET, FILM COATED ORAL at 17:55

## 2024-11-24 NOTE — NURSING NOTE
"Patient reports \"Okay\" sleep. Franco reports his mood is pretty good, and he denies depression or anxiety at this time. He is medication compliant and cooperative. Patient is social with staff and brightness upon approach. Patient prefers the company of himself and reports just waiting for placement. Denies AVH/Si/HI.   "

## 2024-11-24 NOTE — PROGRESS NOTES
"    Psychiatric Progress Note - Department of Behavioral Health   Franco Roberson 39 y.o. male MRN: [id number]  Unit/Bed#: [facility] 347-02 Encounter: [id number]    ASSESSMENT & PLAN     Assessment & Plan  MDD (major depressive disorder), recurrent severe, without psychosis (HCC)  No medications changes at this time, optimize as indicated;  Patient continues to be pending group home placement. No acute concerns today. Pt denied SSI and SSD, CM cont to work on personal care home placement and assisting pt w/ finances. Working on getting money in [employer] account.    Abilify 5 mg daily as mood adjunct   Zoloft 150 mg daily for depression and anxiety  Continue to encourage participation in group therapy, milieu therapy and occupational therapy.  Continue to assess for side effects of medications.  Continue collaboration with PATRICIA for medical co-morbidities as indicated.  Continue discussion with CM/SW to assist with obtaining collateral, disposition planning, and the implementation of patient-centered individualized plan of care.  Continue frequent safety checks and vitals per unit protocol.    Risks, benefits and possible side effects of Medications: Risks, benefits, and possible side effects of medications have previously been explained. No new medications at this time.      Legal status: 201    Disposition: to be determined, pending SOAR application for potential group home placement. OT Cognitive Evaluation completed: \"Pt would benefit from discharge to a supportive environment that can provide checks for safety and compliance with IADLs. \"   Although patient's mood has stabilized, they are currently awaiting group home placement.  Their ability to recognize safety hazards take medications and participate in IADLs are significantly impaired by their cognitive deficits compounded by their chronic mental health needs and would be at risk for significant decompensation without the structure and support of a group home " setting.      No associated orders from this encounter found during lookback period of 72 hours.  Autism spectrum disorder  Continue supportive care    No associated orders from this encounter found during lookback period of 72 hours.      Treatment Recommendations/Precautions:  Continue to promote patient participation in therapeutic milieu.  Continue medical management per medicine.  Continue previously prescribed psychotropic medication regimen; see below.  Continue behavioral health checks q.15 minutes.   Continue vitals per behavioral health unit protocol.  Discharge date per primary team; 201 commitment status.    SUBJECTIVE     Patient evaluated this a.m. for continuity of care. Patient was discussed at length with nursing and treatment team. Per nursing, patient remains calm, cooperative, although isolative in the milieu, adherent to his medications less any acute adverse effects, admitting to a bowel movement last p.m., although states it was small in amount. No acute distress is noted throughout evaluation. Franco Roberson denies suicidal/homicidal ideation in addition to thoughts of self-injury, receptive to crisis planning provided by this writer, contacting for safety in the inpatient setting, admitting to an ability to appropriately confide in staff including this writer. Patient remains scant, sparse, superficial, denying any/all psychiatric complaints/concerns.     PSYCHIATRIC REVIEW OF SYSTEMS     Behavior over the last 24 hours:  unchanged  Sleep: adequate  Appetite: adequate  Medication side effects: No    REVIEW OF SYSTEMS   Review of systems: no complaints    OBJECTIVE     Vital Signs in Past 24 Hours:  Temp:  [96.1 °F (35.6 °C)-97.8 °F (36.6 °C)] 96.1 °F (35.6 °C)  HR:  [71-79] 71  BP: (126-129)/(70-76) 129/70  Resp:  [16] 16  SpO2:  [96 %-99 %] 96 %  O2 Device: None (Room air)    Intake/Output in Past 24 hours:  No intake/output data recorded.  No intake/output data recorded.        Laboratory  Results:  I have personally reviewed all pertinent laboratory/tests results.  Most Recent Labs:   Lab Results   Component Value Date    WBC 6.75 11/12/2024    RBC 5.06 11/12/2024    HGB 15.9 11/12/2024    HCT 46.2 11/12/2024     11/12/2024    RDW 12.8 11/12/2024    NEUTROABS 4.15 11/12/2024    SODIUM 139 11/12/2024    K 4.0 11/12/2024     11/12/2024    CO2 30 11/12/2024    BUN 17 11/12/2024    CREATININE 0.99 11/12/2024    GLUC 85 11/12/2024    GLUF 85 11/12/2024    CALCIUM 9.4 11/12/2024    AST 23 11/12/2024    ALT 32 11/12/2024    ALKPHOS 73 11/12/2024    TP 8.1 11/12/2024    ALB 4.4 11/12/2024    TBILI 0.54 11/12/2024    CHOLESTEROL 168 09/19/2024    HDL 42 09/19/2024    TRIG 137 09/19/2024    LDLCALC 99 09/19/2024    NONHDLC 126 09/19/2024    [id number] 2.532 11/12/2024    FREET4 0.56 (L) 09/10/2024       Behavioral Health Medications: all current active meds have been reviewed and current meds:   Current Facility-Administered Medications:     acetaminophen (TYLENOL) tablet 650 mg, Q6H PRN    acetaminophen (TYLENOL) tablet 650 mg, Q4H PRN    acetaminophen (TYLENOL) tablet 975 mg, Q6H PRN    aluminum-magnesium hydroxide-simethicone (MAALOX) oral suspension 30 mL, Q4H PRN    ARIPiprazole (ABILIFY) tablet 5 mg, Daily    Artificial Tears ophthalmic solution 1 drop, Q3H PRN    atorvastatin (LIPITOR) tablet 10 mg, Daily With Dinner    benztropine (COGENTIN) injection 1 mg, Q4H PRN Max 6/day    benztropine (COGENTIN) tablet 1 mg, Q4H PRN Max 6/day    Cholecalciferol (VITAMIN D3) tablet 2,500 Units, Daily    cyanocobalamin (VITAMIN B-12) tablet 1,000 mcg, Daily    Diclofenac Sodium (VOLTAREN) 1 % topical gel 2 g, 4x Daily PRN    hydrOXYzine HCL (ATARAX) tablet 50 mg, Q6H PRN Max 4/day **OR** diphenhydrAMINE (BENADRYL) injection 50 mg, Q6H PRN    diphenhydrAMINE-zinc acetate (BENADRYL) 2-0.1 % cream, Daily PRN    famotidine (PEPCID) tablet 20 mg, BID    hydrOXYzine HCL (ATARAX) tablet 100 mg, Q6H PRN Max  4/day **OR** LORazepam (ATIVAN) injection 2 mg, Q6H PRN    hydrOXYzine HCL (ATARAX) tablet 25 mg, Q6H PRN Max 4/day    levothyroxine tablet 37.5 mcg, Early Morning    melatonin tablet 6 mg, HS PRN    methocarbamol (ROBAXIN) tablet 500 mg, Q6H PRN    OLANZapine (ZyPREXA) tablet 5 mg, Q4H PRN Max 3/day **OR** OLANZapine (ZyPREXA) IM injection 2.5 mg, Q4H PRN Max 3/day    OLANZapine (ZyPREXA) tablet 5 mg, Q3H PRN Max 3/day **OR** OLANZapine (ZyPREXA) IM injection 5 mg, Q3H PRN Max 3/day    OLANZapine (ZyPREXA) tablet 2.5 mg, Q4H PRN Max 6/day    polyethylene glycol (MIRALAX) packet 17 g, Daily PRN    propranolol (INDERAL) tablet 10 mg, Q8H PRN    senna-docusate sodium (SENOKOT S) 8.6-50 mg per tablet 2 tablet, HS    sertraline (ZOLOFT) tablet 150 mg, Daily    traZODone (DESYREL) tablet 50 mg, HS PRN.    Risks, benefits and possible side effects of Medications:   Risks, benefits, and possible side effects of medications explained to patient and patient verbalizes understanding.      Mental Status Evaluation:  Appearance:  age appropriate, casually dressed, and disheveled   Behavior:  psychomotor retardation   Speech:  soft and scant   Mood:  euthymic   Affect:  constricted   Language sparse   Thought Process:  goal directed   Thought Content:  No overt obsessions or delusions   Perceptual Disturbances: None   Risk Potential: Suicidal Ideations none, Homicidal Ideations none, and Potential for Aggression No   Sensorium:  person, place, and time/date   Cognition:  recent and remote memory grossly intact   Consciousness:  alert and awake    Attention: attention span appeared shorter than expected for age   Insight:  limited   Judgment: limited   Intellect Not assessed   Gait/Station: normal gait/station and normal balance   Motor Activity: no abnormal movements     Memory: Short and long term memory  fair     Progress Toward Goals: unchanged, as evidenced by their participation (or lack thereof) in individual, social and  therapeutic milieu in addition to adherence to their medication regimen.    Recommended Treatment:   See above for assessment and plan.    Inpatient Psychiatric Certification: Based upon physical, mental and social evaluations, I certify that inpatient psychiatric services are medically necessary for this patient for a duration of greater than 2 midnights for the treatment of MDD (major depressive disorder), recurrent severe, without psychosis (HCC) including psychotropic medication management, participation in the therapeutic milieu and referrals as indicated. Available alternative community resources do not meet the patient's mental health care needs. I further attest that an established written individualized plan of care has been implemented and is outlined in the patient's medical records.    Counseling/Coordination of Care    I have expended greater than 15 minutes in which more than 50% of this time was expended in counseling/coordination of patient care relating to diagnostic results, prognosis, potential risks and benefits of management options, instructions for appropriate management, patient and/or collateral education, importance of adherence to management and/or risk factor reductions. Patient's rights, confidentiality, exceptions to confidentiality, use of electronic medical record including appropriate staff access to medical record regarding behavioral health services and consent to treatment were reviewed.    Note Share:     This note was not shared with the patient due to reasonable likelihood of causing patient harm     This note has been constructed using a voice recognition system. There may be translation, syntax,  or grammatical errors. If you have any questions, please contact the dictating provider.    Jordan Christopher Holter, DO 11/24/24

## 2024-11-24 NOTE — PLAN OF CARE
Problem: Ineffective Coping  Goal: Participates in unit activities  Description: Interventions:  - Provide therapeutic environment   - Provide required programming   - Redirect inappropriate behaviors   Outcome: Progressing     Problem: Depression  Goal: Treatment Goal: Demonstrate behavioral control of depressive symptoms, verbalize feelings of improved mood/affect, and adopt new coping skills prior to discharge  Outcome: Progressing  Goal: Verbalize thoughts and feelings  Description: Interventions:  - Assess and re-assess patient's level of risk   - Engage patient in 1:1 interactions, daily, for a minimum of 15 minutes   - Encourage patient to express feelings, fears, frustrations, hopes   Outcome: Progressing  Goal: Refrain from harming self  Description: Interventions:  - Monitor patient closely, per order   - Supervise medication ingestion, monitor effects and side effects   Outcome: Progressing  Goal: Refrain from isolation  Description: Interventions:  - Develop a trusting relationship   - Encourage socialization   Outcome: Progressing     Problem: Anxiety  Goal: Anxiety is at manageable level  Description: Interventions:  - Assess and monitor patient's anxiety level.   - Monitor for signs and symptoms (heart palpitations, chest pain, shortness of breath, headaches, nausea, feeling jumpy, restlessness, irritable, apprehensive).   - Collaborate with interdisciplinary team and initiate plan and interventions as ordered.  - [place] patient to unit/surroundings  - Explain treatment plan  - Encourage participation in care  - Encourage verbalization of concerns/fears  - Identify coping mechanisms  - Assist in developing anxiety-reducing skills  - Administer/offer alternative therapies  - Limit or eliminate stimulants  Outcome: Progressing     Problem: Knowledge Deficit  Goal: Patient/family/caregiver demonstrates understanding of disease process, treatment plan, medications, and discharge  instructions  Description: Complete learning assessment and assess knowledge base.  Interventions:  - Provide teaching at level of understanding  - Provide teaching via preferred learning methods  Outcome: Progressing     Problem: SLEEP DISTURBANCE  Goal: Will exhibit normal sleeping pattern  Description: Interventions:  -  Assess the patients sleep pattern, noting recent changes  - Administer medication as ordered  - Decrease environmental stimuli, including noise, as appropriate during the night  - Encourage the patient to actively participate in unit groups and or exercise during the day to enhance ability to achieve adequate sleep at night  - Assess the patient, in the morning, encouraging a description of sleep experience  Outcome: Progressing

## 2024-11-25 PROCEDURE — 99232 SBSQ HOSP IP/OBS MODERATE 35: CPT | Performed by: PSYCHIATRY & NEUROLOGY

## 2024-11-25 RX ADMIN — FAMOTIDINE 20 MG: 20 TABLET ORAL at 17:10

## 2024-11-25 RX ADMIN — SENNOSIDES AND DOCUSATE SODIUM 2 TABLET: 50; 8.6 TABLET ORAL at 21:26

## 2024-11-25 RX ADMIN — SERTRALINE HYDROCHLORIDE 150 MG: 100 TABLET ORAL at 08:30

## 2024-11-25 RX ADMIN — FAMOTIDINE 20 MG: 20 TABLET ORAL at 08:30

## 2024-11-25 RX ADMIN — Medication 2500 UNITS: at 08:30

## 2024-11-25 RX ADMIN — ATORVASTATIN CALCIUM 10 MG: 10 TABLET, FILM COATED ORAL at 17:10

## 2024-11-25 RX ADMIN — LEVOTHYROXINE SODIUM 37.5 MCG: 125 TABLET ORAL at 06:05

## 2024-11-25 RX ADMIN — CYANOCOBALAMIN TAB 1000 MCG 1000 MCG: 1000 TAB at 08:30

## 2024-11-25 RX ADMIN — ARIPIPRAZOLE 5 MG: 5 TABLET ORAL at 08:30

## 2024-11-25 NOTE — PROGRESS NOTES
"Progress Note - Behavioral Health   Name: Franco Roberson 39 y.o. male I MRN: [id number]  Unit/Bed#: -02 I Date of Admission: 5/14/2024   Date of Service: 11/25/2024 I Hospital Day: 195     Assessment & Plan  MDD (major depressive disorder), recurrent severe, without psychosis (HCC)  No medications changes at this time, optimize as indicated;  Patient continues to be pending group home placement. No acute concerns today. Pt denied SSI and SSD, CM cont to work on personal care home placement and assisting pt w/ finances. Worked w/ SW to place money in [employer] account. Will get f/u lipids.    Abilify 5 mg daily as mood adjunct   Zoloft 150 mg daily for depression and anxiety  Continue to encourage participation in group therapy, milieu therapy and occupational therapy.  Continue to assess for side effects of medications.  Continue collaboration with PATRICIA for medical co-morbidities as indicated.  Continue discussion with CM/SW to assist with obtaining collateral, disposition planning, and the implementation of patient-centered individualized plan of care.  Continue frequent safety checks and vitals per unit protocol.    Risks, benefits and possible side effects of Medications: Risks, benefits, and possible side effects of medications have previously been explained. No new medications at this time.      Legal status: 201    Disposition: to be determined, pending SOAR application for potential group home placement. OT Cognitive Evaluation completed: \"Pt would benefit from discharge to a supportive environment that can provide checks for safety and compliance with IADLs. \"   Although patient's mood has stabilized, they are currently awaiting group home placement.  Their ability to recognize safety hazards take medications and participate in IADLs are significantly impaired by their cognitive deficits compounded by their chronic mental health needs and would be at risk for significant decompensation without the structure " and support of a group home setting.      No associated orders from this encounter found during lookback period of 72 hours.  Autism spectrum disorder  Continue supportive care    No associated orders from this encounter found during lookback period of 72 hours.        Plan     Recommended Treatment:    - Encourage early mobility and having a structured day  - Provide frequent re-orientation, and cognitive stimulation  - Ensure assistive devices are in proper working order (eye-glasses, hearing aids)  - Encourage adequate hydration, nutrition and monitor bowel movements  - Maintain sleep-wake cycle: Uninterrupted sleep time; low-level lighting at night  - Fall precaution  - f/u SLIM recs regarding the medical problems   - Continue medication titration and treatment plan; adjust medication to optimize treatment response and as clinically indicated. .   - Observation: routine            - VS: as per unit protocol  - Diet: Regular diet  - Encourage group attendance and milieu therapy  - Dispo: To be determined     Scheduled medications:  Current Facility-Administered Medications   Medication Dose Route Frequency Provider Last Rate    acetaminophen  650 mg Oral Q6H PRN Basilio Brown MD      acetaminophen  650 mg Oral Q4H PRN Basilio Brown MD      acetaminophen  975 mg Oral Q6H PRN Basilio Brown MD      aluminum-magnesium hydroxide-simethicone  30 mL Oral Q4H PRN Basilio Brown MD      ARIPiprazole  5 mg Oral Daily Basilio Panda MD      Artificial Tears  1 drop Both Eyes Q3H PRN Basilio Brown MD      atorvastatin  10 mg Oral Daily With Dinner WALLY Baptiste      benztropine  1 mg Intramuscular Q4H PRN Max 6/day Basilio Brown MD      benztropine  1 mg Oral Q4H PRN Max 6/day Basilio Brown MD      Cholecalciferol  2,500 Units Oral Daily Dustin Mcallister MD      cyanocobalamin  1,000 mcg Oral Daily WALLY Baptiste      Diclofenac Sodium  2 g Topical 4x  Daily PRN WALLY Baptiste      hydrOXYzine HCL  50 mg Oral Q6H PRN Max 4/day Basilio Brown MD      Or    diphenhydrAMINE  50 mg Intramuscular Q6H PRN Basilio Brown MD      diphenhydrAMINE-zinc acetate   Topical Daily PRN Raj Guerrero MD      famotidine  20 mg Oral BID WALLY Baptiste      hydrOXYzine HCL  100 mg Oral Q6H PRN Max 4/day Basilio Brown MD      Or    LORazepam  2 mg Intramuscular Q6H PRN Basilio Brown MD      hydrOXYzine HCL  25 mg Oral Q6H PRN Max 4/day Basilio Brown MD      levothyroxine  37.5 mcg Oral Early Morning WALLY Baptiste      melatonin  6 mg Oral HS PRN WALLY Gomez      methocarbamol  500 mg Oral Q6H PRN WALLY Baptiste      OLANZapine  5 mg Oral Q4H PRN Max 3/day Basilio Brown MD      Or    OLANZapine  2.5 mg Intramuscular Q4H PRN Max 3/day Basilio Brown MD      OLANZapine  5 mg Oral Q3H PRN Max 3/day Basilio Brown MD      Or    OLANZapine  5 mg Intramuscular Q3H PRN Max 3/day Basilio Brown MD      OLANZapine  2.5 mg Oral Q4H PRN Max 6/day Basilio Brown MD      polyethylene glycol  17 g Oral Daily PRN Basilio Brown MD      propranolol  10 mg Oral Q8H PRN Basilio Brown MD      senna-docusate sodium  2 tablet Oral HS Jordan C Holter, DO      sertraline  150 mg Oral Daily WALLY Gomez      traZODone  50 mg Oral HS PRN Basilio Brown MD        PRN:    acetaminophen    acetaminophen    acetaminophen    aluminum-magnesium hydroxide-simethicone    Artificial Tears    benztropine    benztropine    Diclofenac Sodium    hydrOXYzine HCL **OR** diphenhydrAMINE    diphenhydrAMINE-zinc acetate    hydrOXYzine HCL **OR** LORazepam    hydrOXYzine HCL    melatonin    methocarbamol    OLANZapine **OR** OLANZapine    OLANZapine **OR** OLANZapine    OLANZapine    polyethylene glycol    propranolol    traZODone       Subjective     Patient was visited on  "unit for continuing care; chart reviewed and discussed with multidisciplinary treatment team.  On approach, the patient was calm and cooperative. Denied any changes in mood, appetite, and energy level. Feels mood is doing good overall. No problem initiating and maintaining sleep.  Denied A/VH currently.  Denied SI/HI, intent or plan upon direct inquiry at this time.    Patient continues to be visible in the milieu and interacts with staff and peers. No reports of aggression or self-injurious behavior on unit. No PRN medications used in the past 24 hours.    Patient accepted all offered medications and no adverse effects of medications noted or reported.    Objective    Current Mental Status Evaluation:  Mental Status Exam  Appearance: casually dressed, appears consistent with stated age  Motor: no psychomotor disturbances, no gait abnormalities  Behavior: cooperative, interacts with this writer appropriately  Speech: normal rate, rhythm, and volume  Mood: \"good\"  Affect: euthymic, blunted  Thought Process: organized, linear  Thought Content: denies delusions  Perception: denies auditory hallucinations, denies visual hallucinations,   Risk Potential: denies suicidal ideation, plan, or intent. Denies homicidal ideation  Sensorium: Oriented to person, place, time, and situation  Cognition: cognitive ability appears intact but was not quantitatively tested  Consciousness: alert and awake  Attention: able to focus without difficulty  Insight: limited  Judgement: limited            Vital signs in last 24 hours:    Temp:  [97.1 °F (36.2 °C)] 97.1 °F (36.2 °C)  HR:  [67-97] 67  BP: (120-134)/(72-76) 120/72  Resp:  [16] 16  SpO2:  [97 %] 97 %  O2 Device: None (Room air)    Psychiatric Review of Systems:  Medication adverse effects: none  Sleep: unchanged  Appetite: unchanged  Behaviors over the past 24 hours: unchanged    Laboratory results:    I have personally reviewed all pertinent laboratory/tests results  No results " found for this or any previous visit (from the past 48 hours).       Progress Toward Goals & Illness Status:   progressing, attends groups, participates in milieu therapy, discharge planning    Patient is not at goal. They are not yet ready for discharge. The patient's condition currently requires active psychopharmacological medication management, interdisciplinary coordination with case management, and the utilization of adjunctive milieu and group therapy to augment psychopharmacological efficacy. The patient's risk of morbidity, and progression or decompensation of psychiatric disease, is higher without this current treatment.     Next of Kin  Extended Emergency Contact Information  Primary Emergency Contact: Lois Roberson  Address: [address]           [address] [place] [address] [place]  Home Phone: [phone]  Relation: Mother    Counseling / Coordination of Care  Patient's progress discussed with staff in treatment team meeting.  Medications, treatment progress and treatment plan reviewed with patient.  Medication education provided to patient.  Educated on importance of medication and treatment compliance.  Supportive therapy provided to patient.  Cognitive techniques utilized during the session.  Reassurance and supportive therapy provided.  Reoriented to reality and reassured.  Encouraged participation in milieu and group therapy on the unit.  Crisis/safety plan discussed with patient.       Dustin Mcallister MD  Attending Psychiatrist   [facility]      This note has been constructed using a voice recognition system. There may be translation, syntax, or grammatical errors. If you have any questions, please contact the dictating provider.

## 2024-11-25 NOTE — NURSING NOTE
Pt calm and cooperative. Appears depressed and flat in conversation but tries to remain pleasant. Denies SI/HI/AH/VH. Visible on the unit periodically, but continues to be withdrawn to self. Medication and meal compliant. No complaints at this time.

## 2024-11-25 NOTE — ASSESSMENT & PLAN NOTE
"No medications changes at this time, optimize as indicated;  Patient continues to be pending group home placement. No acute concerns today. Pt denied SSI and SSD, CM cont to work on personal care home placement and assisting pt w/ finances. Worked w/ SW to place money in [employer] account. Will get f/u lipids.    Abilify 5 mg daily as mood adjunct   Zoloft 150 mg daily for depression and anxiety  Continue to encourage participation in group therapy, milieu therapy and occupational therapy.  Continue to assess for side effects of medications.  Continue collaboration with PATRICIA for medical co-morbidities as indicated.  Continue discussion with CM/SW to assist with obtaining collateral, disposition planning, and the implementation of patient-centered individualized plan of care.  Continue frequent safety checks and vitals per unit protocol.    Risks, benefits and possible side effects of Medications: Risks, benefits, and possible side effects of medications have previously been explained. No new medications at this time.      Legal status: 201    Disposition: to be determined, pending SOAR application for potential group home placement. OT Cognitive Evaluation completed: \"Pt would benefit from discharge to a supportive environment that can provide checks for safety and compliance with IADLs. \"   Although patient's mood has stabilized, they are currently awaiting group home placement.  Their ability to recognize safety hazards take medications and participate in IADLs are significantly impaired by their cognitive deficits compounded by their chronic mental health needs and would be at risk for significant decompensation without the structure and support of a group home setting.      No associated orders from this encounter found during lookback period of 72 hours.  "

## 2024-11-25 NOTE — PLAN OF CARE
Problem: Ineffective Coping  Goal: Participates in unit activities  Description: Interventions:  - Provide therapeutic environment   - Provide required programming   - Redirect inappropriate behaviors   Outcome: Not Progressing     Problem: Depression  Goal: Treatment Goal: Demonstrate behavioral control of depressive symptoms, verbalize feelings of improved mood/affect, and adopt new coping skills prior to discharge  Outcome: Progressing  Goal: Verbalize thoughts and feelings  Description: Interventions:  - Assess and re-assess patient's level of risk   - Engage patient in 1:1 interactions, daily, for a minimum of 15 minutes   - Encourage patient to express feelings, fears, frustrations, hopes   Outcome: Progressing  Goal: Refrain from harming self  Description: Interventions:  - Monitor patient closely, per order   - Supervise medication ingestion, monitor effects and side effects   Outcome: Progressing  Goal: Refrain from isolation  Description: Interventions:  - Develop a trusting relationship   - Encourage socialization   Outcome: Not Progressing     Problem: Anxiety  Goal: Anxiety is at manageable level  Description: Interventions:  - Assess and monitor patient's anxiety level.   - Monitor for signs and symptoms (heart palpitations, chest pain, shortness of breath, headaches, nausea, feeling jumpy, restlessness, irritable, apprehensive).   - Collaborate with interdisciplinary team and initiate plan and interventions as ordered.  - [place] patient to unit/surroundings  - Explain treatment plan  - Encourage participation in care  - Encourage verbalization of concerns/fears  - Identify coping mechanisms  - Assist in developing anxiety-reducing skills  - Administer/offer alternative therapies  - Limit or eliminate stimulants  Outcome: Progressing     Problem: Knowledge Deficit  Goal: Patient/family/caregiver demonstrates understanding of disease process, treatment plan, medications, and discharge  instructions  Description: Complete learning assessment and assess knowledge base.  Interventions:  - Provide teaching at level of understanding  - Provide teaching via preferred learning methods  Outcome: Progressing     Problem: SLEEP DISTURBANCE  Goal: Will exhibit normal sleeping pattern  Description: Interventions:  -  Assess the patients sleep pattern, noting recent changes  - Administer medication as ordered  - Decrease environmental stimuli, including noise, as appropriate during the night  - Encourage the patient to actively participate in unit groups and or exercise during the day to enhance ability to achieve adequate sleep at night  - Assess the patient, in the morning, encouraging a description of sleep experience  Outcome: Progressing

## 2024-11-26 LAB
CHOLEST SERPL-MCNC: 151 MG/DL (ref ?–200)
HDLC SERPL-MCNC: 39 MG/DL
LDLC SERPL CALC-MCNC: 78 MG/DL (ref 0–100)
NONHDLC SERPL-MCNC: 112 MG/DL
TRIGL SERPL-MCNC: 170 MG/DL (ref ?–150)

## 2024-11-26 PROCEDURE — 99232 SBSQ HOSP IP/OBS MODERATE 35: CPT | Performed by: PSYCHIATRY & NEUROLOGY

## 2024-11-26 PROCEDURE — 80061 LIPID PANEL: CPT | Performed by: PSYCHIATRY & NEUROLOGY

## 2024-11-26 RX ORDER — AMOXICILLIN 250 MG
2 CAPSULE ORAL
Status: DISPENSED | OUTPATIENT
Start: 2024-11-26

## 2024-11-26 RX ADMIN — LEVOTHYROXINE SODIUM 37.5 MCG: 125 TABLET ORAL at 06:31

## 2024-11-26 RX ADMIN — ATORVASTATIN CALCIUM 10 MG: 10 TABLET, FILM COATED ORAL at 17:11

## 2024-11-26 RX ADMIN — FAMOTIDINE 20 MG: 20 TABLET ORAL at 08:18

## 2024-11-26 RX ADMIN — ARIPIPRAZOLE 5 MG: 5 TABLET ORAL at 08:18

## 2024-11-26 RX ADMIN — Medication 2500 UNITS: at 08:16

## 2024-11-26 RX ADMIN — SERTRALINE HYDROCHLORIDE 150 MG: 100 TABLET ORAL at 08:17

## 2024-11-26 RX ADMIN — FAMOTIDINE 20 MG: 20 TABLET ORAL at 17:11

## 2024-11-26 RX ADMIN — CYANOCOBALAMIN TAB 1000 MCG 1000 MCG: 1000 TAB at 08:18

## 2024-11-26 RX ADMIN — SENNOSIDES AND DOCUSATE SODIUM 2 TABLET: 50; 8.6 TABLET ORAL at 17:11

## 2024-11-26 NOTE — PROGRESS NOTES
11/26/24 0842   Team Meeting   Meeting Type Daily Rounds   Team Members Present   Team Members Present Physician;Nurse;   Physician Team Member Ary   Nursing Team Member [facility] Management Team Member Suhas   Patient/Family Present   Patient Present No   Patient's Family Present No     Pt pleasant, calm, cooperative. Visible on the unit. Discharge pending placement.

## 2024-11-26 NOTE — SOCIAL WORK
Cm spoke with [facility], they reported Pt had a scheduled appointment with them today. [facility] was not aware of this appointment. Cm had to reschedule the appointment for   December 13, 2024 at 9 am for [facility] interview for SSD/ SSI.    [facility] confirmed they received the outstanding hospital bill and application on 11/18/24.

## 2024-11-26 NOTE — PLAN OF CARE
Problem: Ineffective Coping  Goal: Participates in unit activities  Description: Interventions:  - Provide therapeutic environment   - Provide required programming   - Redirect inappropriate behaviors   Outcome: Progressing     Problem: Depression  Goal: Treatment Goal: Demonstrate behavioral control of depressive symptoms, verbalize feelings of improved mood/affect, and adopt new coping skills prior to discharge  Outcome: Progressing  Goal: Verbalize thoughts and feelings  Description: Interventions:  - Assess and re-assess patient's level of risk   - Engage patient in 1:1 interactions, daily, for a minimum of 15 minutes   - Encourage patient to express feelings, fears, frustrations, hopes   Outcome: Progressing  Goal: Refrain from harming self  Description: Interventions:  - Monitor patient closely, per order   - Supervise medication ingestion, monitor effects and side effects   Outcome: Progressing  Goal: Refrain from isolation  Description: Interventions:  - Develop a trusting relationship   - Encourage socialization   Outcome: Progressing     Problem: Anxiety  Goal: Anxiety is at manageable level  Description: Interventions:  - Assess and monitor patient's anxiety level.   - Monitor for signs and symptoms (heart palpitations, chest pain, shortness of breath, headaches, nausea, feeling jumpy, restlessness, irritable, apprehensive).   - Collaborate with interdisciplinary team and initiate plan and interventions as ordered.  - [place] patient to unit/surroundings  - Explain treatment plan  - Encourage participation in care  - Encourage verbalization of concerns/fears  - Identify coping mechanisms  - Assist in developing anxiety-reducing skills  - Administer/offer alternative therapies  - Limit or eliminate stimulants  Outcome: Progressing     Problem: DISCHARGE PLANNING - CARE MANAGEMENT  Goal: Discharge to post-acute care or home with appropriate resources  Description: INTERVENTIONS:  - Conduct assessment to  determine patient/family and health care team treatment goals, and need for post-acute services based on payer coverage, community resources, and patient preferences, and barriers to discharge  - Address psychosocial, clinical, and financial barriers to discharge as identified in assessment in conjunction with the patient/family and health care team  - Arrange appropriate level of post-acute services according to patient’s   needs and preference and payer coverage in collaboration with the physician and health care team  - Communicate with and update the patient/family, physician, and health care team regarding progress on the discharge plan  - Arrange appropriate transportation to post-acute venues  Outcome: Progressing     Problem: Knowledge Deficit  Goal: Patient/family/caregiver demonstrates understanding of disease process, treatment plan, medications, and discharge instructions  Description: Complete learning assessment and assess knowledge base.  Interventions:  - Provide teaching at level of understanding  - Provide teaching via preferred learning methods  Outcome: Progressing     Problem: SLEEP DISTURBANCE  Goal: Will exhibit normal sleeping pattern  Description: Interventions:  -  Assess the patients sleep pattern, noting recent changes  - Administer medication as ordered  - Decrease environmental stimuli, including noise, as appropriate during the night  - Encourage the patient to actively participate in unit groups and or exercise during the day to enhance ability to achieve adequate sleep at night  - Assess the patient, in the morning, encouraging a description of sleep experience  Outcome: Progressing

## 2024-11-26 NOTE — NURSING NOTE
Pt is calm and cooperative upon approach. Pt is isolative to room, denies feeling depressed though appears to be. Pt is medication and meal compliant , eating meals in their room in the dark. Denies SI, HI,AH, and VH. Denies any needs at this time.

## 2024-11-26 NOTE — PROGRESS NOTES
"Progress Note - Behavioral Health   Name: Franco Roberson 39 y.o. male I MRN: [id number]  Unit/Bed#: -02 I Date of Admission: 5/14/2024   Date of Service: 11/26/2024 I Hospital Day: 196     Assessment & Plan  MDD (major depressive disorder), recurrent severe, without psychosis (HCC)  No medications changes at this time, optimize as indicated;  Patient continues to be pending group home placement. No acute concerns today. Pt denied SSI and SSD, CM cont to work on personal care home placement and assisting pt w/ finances. Worked w/ SW to place money in [employer] account. Will get f/u lipids.    Abilify 5 mg daily as mood adjunct   Zoloft 150 mg daily for depression and anxiety  Continue to encourage participation in group therapy, milieu therapy and occupational therapy.  Continue to assess for side effects of medications.  Continue collaboration with PATRICIA for medical co-morbidities as indicated.  Continue discussion with CM/SW to assist with obtaining collateral, disposition planning, and the implementation of patient-centered individualized plan of care.  Continue frequent safety checks and vitals per unit protocol.    Risks, benefits and possible side effects of Medications: Risks, benefits, and possible side effects of medications have previously been explained. No new medications at this time.      Legal status: 201    Disposition: to be determined, pending SOAR application for potential group home placement. OT Cognitive Evaluation completed: \"Pt would benefit from discharge to a supportive environment that can provide checks for safety and compliance with IADLs. \"   Although patient's mood has stabilized, they are currently awaiting group home placement.  Their ability to recognize safety hazards take medications and participate in IADLs are significantly impaired by their cognitive deficits compounded by their chronic mental health needs and would be at risk for significant decompensation without the structure " and support of a group home setting.      No associated orders from this encounter found during lookback period of 72 hours.  Autism spectrum disorder  Continue supportive care    No associated orders from this encounter found during lookback period of 72 hours.        Plan     Recommended Treatment:    - Encourage early mobility and having a structured day  - Provide frequent re-orientation, and cognitive stimulation  - Ensure assistive devices are in proper working order (eye-glasses, hearing aids)  - Encourage adequate hydration, nutrition and monitor bowel movements  - Maintain sleep-wake cycle: Uninterrupted sleep time; low-level lighting at night  - Fall precaution  - f/u SLIM recs regarding the medical problems   - Continue medication titration and treatment plan; adjust medication to optimize treatment response and as clinically indicated. .   - Observation: routine            - VS: as per unit protocol  - Diet: Regular diet  - Encourage group attendance and milieu therapy  - Dispo: To be determined     Scheduled medications:  Current Facility-Administered Medications   Medication Dose Route Frequency Provider Last Rate    acetaminophen  650 mg Oral Q6H PRN Basilio Brown MD      acetaminophen  650 mg Oral Q4H PRN Basilio rBown MD      acetaminophen  975 mg Oral Q6H PRN Basilio Brown MD      aluminum-magnesium hydroxide-simethicone  30 mL Oral Q4H PRN Basilio Brown MD      ARIPiprazole  5 mg Oral Daily Basilio Panda MD      Artificial Tears  1 drop Both Eyes Q3H PRN Basilio Brown MD      atorvastatin  10 mg Oral Daily With Dinner WALLY Baptiste      benztropine  1 mg Intramuscular Q4H PRN Max 6/day Basilio Brown MD      benztropine  1 mg Oral Q4H PRN Max 6/day Basilio Brown MD      Cholecalciferol  2,500 Units Oral Daily Dustin Mcallister MD      cyanocobalamin  1,000 mcg Oral Daily WALLY Baptiste      Diclofenac Sodium  2 g Topical 4x  Daily PRN WALLY Baptiste      hydrOXYzine HCL  50 mg Oral Q6H PRN Max 4/day Basilio Brown MD      Or    diphenhydrAMINE  50 mg Intramuscular Q6H PRN Basilio Brown MD      diphenhydrAMINE-zinc acetate   Topical Daily PRN Raj Guerrero MD      famotidine  20 mg Oral BID WALLY Baptiste      hydrOXYzine HCL  100 mg Oral Q6H PRN Max 4/day Basilio Brown MD      Or    LORazepam  2 mg Intramuscular Q6H PRN Basilio Brown MD      hydrOXYzine HCL  25 mg Oral Q6H PRN Max 4/day Basilio Brown MD      levothyroxine  37.5 mcg Oral Early Morning WALLY Baptiste      melatonin  6 mg Oral HS PRN WALLY Gomez      methocarbamol  500 mg Oral Q6H PRN WALLY Baptiste      OLANZapine  5 mg Oral Q4H PRN Max 3/day Basilio Brown MD      Or    OLANZapine  2.5 mg Intramuscular Q4H PRN Max 3/day Basilio Brown MD      OLANZapine  5 mg Oral Q3H PRN Max 3/day Basilio Brown MD      Or    OLANZapine  5 mg Intramuscular Q3H PRN Max 3/day Basilio Brown MD      OLANZapine  2.5 mg Oral Q4H PRN Max 6/day Basilio Brown MD      polyethylene glycol  17 g Oral Daily PRN Basilio Brown MD      propranolol  10 mg Oral Q8H PRN Basilio Brown MD      senna-docusate sodium  2 tablet Oral HS Jordan C Holter, DO      sertraline  150 mg Oral Daily WALLY Gomez      traZODone  50 mg Oral HS PRN Basilio Brown MD        PRN:    acetaminophen    acetaminophen    acetaminophen    aluminum-magnesium hydroxide-simethicone    Artificial Tears    benztropine    benztropine    Diclofenac Sodium    hydrOXYzine HCL **OR** diphenhydrAMINE    diphenhydrAMINE-zinc acetate    hydrOXYzine HCL **OR** LORazepam    hydrOXYzine HCL    melatonin    methocarbamol    OLANZapine **OR** OLANZapine    OLANZapine **OR** OLANZapine    OLANZapine    polyethylene glycol    propranolol    traZODone       Subjective     Patient was visited on  "unit for continuing care; chart reviewed and discussed with multidisciplinary treatment team.  On approach, the patient was calm and cooperative. Denied any changes in mood, appetite, and energy level. Requested that his Senna be moved to be taken after dinner. No problem initiating and maintaining sleep.  Denied A/VH currently.  Denied SI/HI, intent or plan upon direct inquiry at this time.    Patient continues to be visible in the milieu and interacts with staff and peers. No reports of aggression or self-injurious behavior on unit. No PRN medications used in the past 24 hours.    Patient accepted all offered medications and no adverse effects of medications noted or reported.    Objective    Current Mental Status Evaluation:  Mental Status Exam  Appearance: casually dressed, consistent with stated age  Motor: no psychomotor retardation, no gait abnormalities  Behavior: cooperative, answers questions appropriately  Speech: soft, normal rhythm  Mood: \"ok\"  Affect: blunted  Thought Process: linear and goal-oriented  Thought Content: denies delusions  Risk Potential: denies suicidal ideation, plan, or intent. Denies homicidal ideation  Perceptions: denies auditory hallucinations, denies visual hallucinations,   Sensorium: Oriented to person, place, time, and situation  Cognition: cognitive ability appears intact but was not quantitatively tested  Consciousness: alert and awake  Attention: intact, able to focus without difficulty  Insight: limited  Judgement: limited          Vital signs in last 24 hours:    Temp:  [96.5 °F (35.8 °C)-97.5 °F (36.4 °C)] 97.5 °F (36.4 °C)  HR:  [61-79] 61  BP: (119-126)/(65-72) 119/65  Resp:  [16] 16  SpO2:  [98 %] 98 %  O2 Device: None (Room air)    Psychiatric Review of Systems:  Medication adverse effects: none  Sleep: unchanged  Appetite: unchanged  Behaviors over the past 24 hours: unchanged    Laboratory results:    I have personally reviewed all pertinent laboratory/tests " results  Recent Results (from the past 48 hours)   Lipid panel    Collection Time: 11/26/24  6:17 AM   Result Value Ref Range    Cholesterol 151 See Comment mg/dL    Triglycerides 170 (H) See Comment mg/dL    HDL, Direct 39 (L) >=40 mg/dL    LDL Calculated 78 0 - 100 mg/dL    Non-HDL-Chol (CHOL-HDL) 112 mg/dl          Progress Toward Goals & Illness Status:   progressing, attends groups, participates in milieu therapy, placement pending    Patient is not at goal. They are not yet ready for discharge. The patient's condition currently requires active psychopharmacological medication management, interdisciplinary coordination with case management, and the utilization of adjunctive milieu and group therapy to augment psychopharmacological efficacy. The patient's risk of morbidity, and progression or decompensation of psychiatric disease, is higher without this current treatment.     Next of Kin  Extended Emergency Contact Information  Primary Emergency Contact: Lois Roberson  Address: [address] [place] [address]  Home Phone: [phone]  Relation: Mother    Counseling / Coordination of Care  Patient's progress discussed with staff in treatment team meeting.  Medications, treatment progress and treatment plan reviewed with patient.  Medication education provided to patient.  Educated on importance of medication and treatment compliance.  Supportive therapy provided to patient.  Cognitive techniques utilized during the session.  Reassurance and supportive therapy provided.  Reoriented to reality and reassured.  Encouraged participation in milieu and group therapy on the unit.  Crisis/safety plan discussed with patient.       Dustin Mcallister MD  Attending Psychiatrist   [facility]      This note has been constructed using a voice recognition system. There may be translation, syntax, or grammatical errors. If you have any questions, please  contact the dictating provider.

## 2024-11-27 PROCEDURE — 99232 SBSQ HOSP IP/OBS MODERATE 35: CPT | Performed by: PSYCHIATRY & NEUROLOGY

## 2024-11-27 RX ADMIN — FAMOTIDINE 20 MG: 20 TABLET ORAL at 08:07

## 2024-11-27 RX ADMIN — SERTRALINE HYDROCHLORIDE 150 MG: 100 TABLET ORAL at 08:07

## 2024-11-27 RX ADMIN — ARIPIPRAZOLE 5 MG: 5 TABLET ORAL at 08:07

## 2024-11-27 RX ADMIN — LEVOTHYROXINE SODIUM 37.5 MCG: 125 TABLET ORAL at 06:22

## 2024-11-27 RX ADMIN — FAMOTIDINE 20 MG: 20 TABLET ORAL at 17:11

## 2024-11-27 RX ADMIN — SENNOSIDES AND DOCUSATE SODIUM 2 TABLET: 50; 8.6 TABLET ORAL at 17:11

## 2024-11-27 RX ADMIN — CYANOCOBALAMIN TAB 1000 MCG 1000 MCG: 1000 TAB at 08:07

## 2024-11-27 RX ADMIN — Medication 2500 UNITS: at 08:07

## 2024-11-27 RX ADMIN — ATORVASTATIN CALCIUM 10 MG: 10 TABLET, FILM COATED ORAL at 17:11

## 2024-11-27 NOTE — PROGRESS NOTES
11/27/24 0844   Team Meeting   Meeting Type Daily Rounds   Team Members Present   Team Members Present Physician;Nurse;   Physician Team Member Ary   Nursing Team Member [facility] Team Member Suhas   Patient/Family Present   Patient Present No   Patient's Family Present No     Pt med/meal compliant. Visible on the unit. Pleasant, calm, cooperative. Discharge pending placement.

## 2024-11-27 NOTE — NURSING NOTE
Pt is alert and able to make needs known, remained isolated to self in room. Denies SI/SI/AH/VH. Denies anxiety and depression. Calm and quiet. Q15 min checks continued.

## 2024-11-27 NOTE — PROGRESS NOTES
"Progress Note - Behavioral Health   Name: Franco Roberson 39 y.o. male I MRN: [id number]  Unit/Bed#: -02 I Date of Admission: 5/14/2024   Date of Service: 11/27/2024 I Hospital Day: 197     Assessment & Plan  MDD (major depressive disorder), recurrent severe, without psychosis (HCC)  No medications changes at this time, optimize as indicated;  Patient continues to be pending group home placement. No acute concerns today. Pt denied SSI and SSD, CM cont to work on personal care home placement and assisting pt w/ finances. Worked w/ SW to place money in [employer] account. New meeting w/ [facility] in Dec.    Abilify 5 mg daily as mood adjunct   Zoloft 150 mg daily for depression and anxiety  Continue to encourage participation in group therapy, milieu therapy and occupational therapy.  Continue to assess for side effects of medications.  Continue collaboration with SLIM for medical co-morbidities as indicated.  Continue discussion with CM/SW to assist with obtaining collateral, disposition planning, and the implementation of patient-centered individualized plan of care.  Continue frequent safety checks and vitals per unit protocol.    Risks, benefits and possible side effects of Medications: Risks, benefits, and possible side effects of medications have previously been explained. No new medications at this time.      Legal status: 201    Disposition: to be determined, pending SOAR application for potential group home placement. OT Cognitive Evaluation completed: \"Pt would benefit from discharge to a supportive environment that can provide checks for safety and compliance with IADLs. \"   Although patient's mood has stabilized, they are currently awaiting group home placement.  Their ability to recognize safety hazards take medications and participate in IADLs are significantly impaired by their cognitive deficits compounded by their chronic mental health needs and would be at risk for significant decompensation without the " structure and support of a group home setting.      No associated orders from this encounter found during lookback period of 72 hours.  Autism spectrum disorder  Continue supportive care    No associated orders from this encounter found during lookback period of 72 hours.        Plan     Recommended Treatment:    - Encourage early mobility and having a structured day  - Provide frequent re-orientation, and cognitive stimulation  - Ensure assistive devices are in proper working order (eye-glasses, hearing aids)  - Encourage adequate hydration, nutrition and monitor bowel movements  - Maintain sleep-wake cycle: Uninterrupted sleep time; low-level lighting at night  - Fall precaution  - f/u SLIM recs regarding the medical problems   - Continue medication titration and treatment plan; adjust medication to optimize treatment response and as clinically indicated. .   - Observation: routine            - VS: as per unit protocol  - Diet: Regular diet  - Encourage group attendance and milieu therapy  - Dispo: To be determined     Scheduled medications:  Current Facility-Administered Medications   Medication Dose Route Frequency Provider Last Rate    acetaminophen  650 mg Oral Q6H PRN Basilio Brown MD      acetaminophen  650 mg Oral Q4H PRN Basilio Brown MD      acetaminophen  975 mg Oral Q6H PRN Basilio Brown MD      aluminum-magnesium hydroxide-simethicone  30 mL Oral Q4H PRN Basilio Brown MD      ARIPiprazole  5 mg Oral Daily Basilio Panda MD      Artificial Tears  1 drop Both Eyes Q3H PRN Basilio Brown MD      atorvastatin  10 mg Oral Daily With Dinner WALLY Baptiste      benztropine  1 mg Intramuscular Q4H PRN Max 6/day Basilio Brown MD      benztropine  1 mg Oral Q4H PRN Max 6/day Basilio Brown MD      Cholecalciferol  2,500 Units Oral Daily Dustin Mcallister MD      cyanocobalamin  1,000 mcg Oral Daily WALLY Baptiste      Diclofenac Sodium  2 g  Topical 4x Daily PRN Laura Ashford, WALLY      hydrOXYzine HCL  50 mg Oral Q6H PRN Max 4/day Basilio Brown MD      Or    diphenhydrAMINE  50 mg Intramuscular Q6H PRN Basilio Brown MD      diphenhydrAMINE-zinc acetate   Topical Daily PRN Raj Guerrero MD      famotidine  20 mg Oral BID Laura Amber Ashford, WALLY      hydrOXYzine HCL  100 mg Oral Q6H PRN Max 4/day Basilio Brown MD      Or    LORazepam  2 mg Intramuscular Q6H PRN Basilio Borwn MD      hydrOXYzine HCL  25 mg Oral Q6H PRN Max 4/day Basilio Brown MD      levothyroxine  37.5 mcg Oral Early Morning Laura Ashford, ARJUNNP      melatonin  6 mg Oral HS PRN WALLY Gomez      methocarbamol  500 mg Oral Q6H PRN WALLY Baptiste      OLANZapine  5 mg Oral Q4H PRN Max 3/day Basilio Brown MD      Or    OLANZapine  2.5 mg Intramuscular Q4H PRN Max 3/day Basilio Brown MD      OLANZapine  5 mg Oral Q3H PRN Max 3/day Basilio Bronw MD      Or    OLANZapine  5 mg Intramuscular Q3H PRN Max 3/day Basilio Brown MD      OLANZapine  2.5 mg Oral Q4H PRN Max 6/day Basilio Brown MD      polyethylene glycol  17 g Oral Daily PRN Basilio Brown MD      propranolol  10 mg Oral Q8H PRN Basilio Brown MD      senna-docusate sodium  2 tablet Oral After Dinner Dustin Mcallister MD      sertraline  150 mg Oral Daily WALLY Gomez      traZODone  50 mg Oral HS PRN Basilio Brown MD        PRN:    acetaminophen    acetaminophen    acetaminophen    aluminum-magnesium hydroxide-simethicone    Artificial Tears    benztropine    benztropine    Diclofenac Sodium    hydrOXYzine HCL **OR** diphenhydrAMINE    diphenhydrAMINE-zinc acetate    hydrOXYzine HCL **OR** LORazepam    hydrOXYzine HCL    melatonin    methocarbamol    OLANZapine **OR** OLANZapine    OLANZapine **OR** OLANZapine    OLANZapine    polyethylene glycol    propranolol    traZODone       Subjective    "  Patient was visited on unit for continuing care; chart reviewed and discussed with multidisciplinary treatment team.  On approach, the patient was calm and cooperative. Denied any changes in mood, appetite, and energy level.  Per case management reports, patient is supposed to have a meeting with Social Security in about 2 weeks in December.  Per documentation meeting was supposed to open yesterday but case management and the patient were not informed of this.  No problem initiating and maintaining sleep.  Denied A/VH currently.  Denied SI/HI, intent or plan upon direct inquiry at this time.    Patient continues to be visible in the milieu and interacts with staff and peers. No reports of aggression or self-injurious behavior on unit. No PRN medications used in the past 24 hours.    Patient accepted all offered medications and no adverse effects of medications noted or reported.    Objective    Current Mental Status Evaluation:  Mental Status Exam  Appearance: casually dressed, consistent with stated age  Motor: no psychomotor retardation, no gait abnormalities  Behavior: cooperative, answers questions appropriately  Speech: soft, normal rhythm  Mood: \"good\"  Affect: blunted  Thought Process: linear and goal-oriented  Thought Content: denies delusions  Risk Potential: denies suicidal ideation, plan, or intent. Denies homicidal ideation  Perceptions: denies auditory hallucinations, denies visual hallucinations,   Sensorium: Oriented to person, place, time, and situation  Cognition: cognitive ability appears intact but was not quantitatively tested  Consciousness: alert and awake  Attention: intact, able to focus without difficulty  Insight: limited  Judgement: limited            Vital signs in last 24 hours:    Temp:  [96.2 °F (35.7 °C)-97 °F (36.1 °C)] 96.2 °F (35.7 °C)  HR:  [71-73] 71  BP: (118-127)/(68-73) 118/68  SpO2:  [96 %-98 %] 98 %  O2 Device: None (Room air)    Psychiatric Review of Systems:  Medication " adverse effects: none  Sleep: unchanged  Appetite: unchanged  Behaviors over the past 24 hours: unchanged    Laboratory results:    I have personally reviewed all pertinent laboratory/tests results  Recent Results (from the past 48 hours)   Lipid panel    Collection Time: 11/26/24  6:17 AM   Result Value Ref Range    Cholesterol 151 See Comment mg/dL    Triglycerides 170 (H) See Comment mg/dL    HDL, Direct 39 (L) >=40 mg/dL    LDL Calculated 78 0 - 100 mg/dL    Non-HDL-Chol (CHOL-HDL) 112 mg/dl          Progress Toward Goals & Illness Status:   progressing, attends groups, participates in milieu therapy, discharge planning    Patient is not at goal. They are not yet ready for discharge. The patient's condition currently requires active psychopharmacological medication management, interdisciplinary coordination with case management, and the utilization of adjunctive milieu and group therapy to augment psychopharmacological efficacy. The patient's risk of morbidity, and progression or decompensation of psychiatric disease, is higher without this current treatment.     Next of Kin  Extended Emergency Contact Information  Primary Emergency Contact: Lois Roberson  Address: [address] [place] [address]  Home Phone: [phone]  Relation: Mother    Counseling / Coordination of Care  Patient's progress discussed with staff in treatment team meeting.  Medications, treatment progress and treatment plan reviewed with patient.  Medication education provided to patient.  Educated on importance of medication and treatment compliance.  Supportive therapy provided to patient.  Cognitive techniques utilized during the session.  Reassurance and supportive therapy provided.  Reoriented to reality and reassured.  Encouraged participation in milieu and group therapy on the unit.  Crisis/safety plan discussed with patient.       Dustin Mcallister MD  Attending Psychiatrist     [facility]      This note has been constructed using a voice recognition system. There may be translation, syntax, or grammatical errors. If you have any questions, please contact the dictating provider.

## 2024-11-27 NOTE — NURSING NOTE
Pt calm and cooperative. Brightens upon approach, but still appears depressed. Visible on the unit periodically walking the halls but seclusive to self. Denies SI/HI/AH/VH. Medication and meal compliant. No current unmet needs.

## 2024-11-27 NOTE — ASSESSMENT & PLAN NOTE
"No medications changes at this time, optimize as indicated;  Patient continues to be pending group home placement. No acute concerns today. Pt denied SSI and SSD, CM cont to work on personal care home placement and assisting pt w/ finances. Worked w/ SW to place money in [employer] account. New meeting w/ [facility] in Dec.    Abilify 5 mg daily as mood adjunct   Zoloft 150 mg daily for depression and anxiety  Continue to encourage participation in group therapy, milieu therapy and occupational therapy.  Continue to assess for side effects of medications.  Continue collaboration with PATRICIA for medical co-morbidities as indicated.  Continue discussion with CM/SW to assist with obtaining collateral, disposition planning, and the implementation of patient-centered individualized plan of care.  Continue frequent safety checks and vitals per unit protocol.    Risks, benefits and possible side effects of Medications: Risks, benefits, and possible side effects of medications have previously been explained. No new medications at this time.      Legal status: 201    Disposition: to be determined, pending SOAR application for potential group home placement. OT Cognitive Evaluation completed: \"Pt would benefit from discharge to a supportive environment that can provide checks for safety and compliance with IADLs. \"   Although patient's mood has stabilized, they are currently awaiting group home placement.  Their ability to recognize safety hazards take medications and participate in IADLs are significantly impaired by their cognitive deficits compounded by their chronic mental health needs and would be at risk for significant decompensation without the structure and support of a group home setting.      No associated orders from this encounter found during lookback period of 72 hours.  "

## 2024-11-27 NOTE — PLAN OF CARE
Problem: Ineffective Coping  Goal: Participates in unit activities  Description: Interventions:  - Provide therapeutic environment   - Provide required programming   - Redirect inappropriate behaviors   Outcome: Progressing     Problem: Depression  Goal: Treatment Goal: Demonstrate behavioral control of depressive symptoms, verbalize feelings of improved mood/affect, and adopt new coping skills prior to discharge  Outcome: Progressing  Goal: Verbalize thoughts and feelings  Description: Interventions:  - Assess and re-assess patient's level of risk   - Engage patient in 1:1 interactions, daily, for a minimum of 15 minutes   - Encourage patient to express feelings, fears, frustrations, hopes   Outcome: Progressing  Goal: Refrain from harming self  Description: Interventions:  - Monitor patient closely, per order   - Supervise medication ingestion, monitor effects and side effects   Outcome: Progressing  Goal: Refrain from isolation  Description: Interventions:  - Develop a trusting relationship   - Encourage socialization   Outcome: Progressing     Problem: Anxiety  Goal: Anxiety is at manageable level  Description: Interventions:  - Assess and monitor patient's anxiety level.   - Monitor for signs and symptoms (heart palpitations, chest pain, shortness of breath, headaches, nausea, feeling jumpy, restlessness, irritable, apprehensive).   - Collaborate with interdisciplinary team and initiate plan and interventions as ordered.  - [place] patient to unit/surroundings  - Explain treatment plan  - Encourage participation in care  - Encourage verbalization of concerns/fears  - Identify coping mechanisms  - Assist in developing anxiety-reducing skills  - Administer/offer alternative therapies  - Limit or eliminate stimulants  Outcome: Progressing     Problem: DISCHARGE PLANNING - CARE MANAGEMENT  Goal: Discharge to post-acute care or home with appropriate resources  Description: INTERVENTIONS:  - Conduct assessment to  determine patient/family and health care team treatment goals, and need for post-acute services based on payer coverage, community resources, and patient preferences, and barriers to discharge  - Address psychosocial, clinical, and financial barriers to discharge as identified in assessment in conjunction with the patient/family and health care team  - Arrange appropriate level of post-acute services according to patient’s   needs and preference and payer coverage in collaboration with the physician and health care team  - Communicate with and update the patient/family, physician, and health care team regarding progress on the discharge plan  - Arrange appropriate transportation to post-acute venues  Outcome: Progressing     Problem: Knowledge Deficit  Goal: Patient/family/caregiver demonstrates understanding of disease process, treatment plan, medications, and discharge instructions  Description: Complete learning assessment and assess knowledge base.  Interventions:  - Provide teaching at level of understanding  - Provide teaching via preferred learning methods  Outcome: Progressing     Problem: SLEEP DISTURBANCE  Goal: Will exhibit normal sleeping pattern  Description: Interventions:  -  Assess the patients sleep pattern, noting recent changes  - Administer medication as ordered  - Decrease environmental stimuli, including noise, as appropriate during the night  - Encourage the patient to actively participate in unit groups and or exercise during the day to enhance ability to achieve adequate sleep at night  - Assess the patient, in the morning, encouraging a description of sleep experience  Outcome: Progressing

## 2024-11-28 PROCEDURE — 99232 SBSQ HOSP IP/OBS MODERATE 35: CPT | Performed by: PSYCHIATRY & NEUROLOGY

## 2024-11-28 RX ADMIN — CYANOCOBALAMIN TAB 1000 MCG 1000 MCG: 1000 TAB at 08:29

## 2024-11-28 RX ADMIN — SERTRALINE HYDROCHLORIDE 150 MG: 100 TABLET ORAL at 08:29

## 2024-11-28 RX ADMIN — FAMOTIDINE 20 MG: 20 TABLET ORAL at 17:13

## 2024-11-28 RX ADMIN — Medication 2500 UNITS: at 08:29

## 2024-11-28 RX ADMIN — LEVOTHYROXINE SODIUM 37.5 MCG: 125 TABLET ORAL at 06:10

## 2024-11-28 RX ADMIN — SENNOSIDES AND DOCUSATE SODIUM 2 TABLET: 50; 8.6 TABLET ORAL at 17:13

## 2024-11-28 RX ADMIN — ATORVASTATIN CALCIUM 10 MG: 10 TABLET, FILM COATED ORAL at 17:13

## 2024-11-28 RX ADMIN — ARIPIPRAZOLE 5 MG: 5 TABLET ORAL at 08:29

## 2024-11-28 RX ADMIN — FAMOTIDINE 20 MG: 20 TABLET ORAL at 08:29

## 2024-11-28 NOTE — PLAN OF CARE
Problem: Depression  Goal: Treatment Goal: Demonstrate behavioral control of depressive symptoms, verbalize feelings of improved mood/affect, and adopt new coping skills prior to discharge  Outcome: Progressing  Goal: Verbalize thoughts and feelings  Description: Interventions:  - Assess and re-assess patient's level of risk   - Engage patient in 1:1 interactions, daily, for a minimum of 15 minutes   - Encourage patient to express feelings, fears, frustrations, hopes   Outcome: Progressing  Goal: Refrain from harming self  Description: Interventions:  - Monitor patient closely, per order   - Supervise medication ingestion, monitor effects and side effects   Outcome: Progressing  Goal: Refrain from isolation  Description: Interventions:  - Develop a trusting relationship   - Encourage socialization   Outcome: Progressing     Problem: Anxiety  Goal: Anxiety is at manageable level  Description: Interventions:  - Assess and monitor patient's anxiety level.   - Monitor for signs and symptoms (heart palpitations, chest pain, shortness of breath, headaches, nausea, feeling jumpy, restlessness, irritable, apprehensive).   - Collaborate with interdisciplinary team and initiate plan and interventions as ordered.  - [place] patient to unit/surroundings  - Explain treatment plan  - Encourage participation in care  - Encourage verbalization of concerns/fears  - Identify coping mechanisms  - Assist in developing anxiety-reducing skills  - Administer/offer alternative therapies  - Limit or eliminate stimulants  Outcome: Progressing     Problem: DISCHARGE PLANNING - CARE MANAGEMENT  Goal: Discharge to post-acute care or home with appropriate resources  Description: INTERVENTIONS:  - Conduct assessment to determine patient/family and health care team treatment goals, and need for post-acute services based on payer coverage, community resources, and patient preferences, and barriers to discharge  - Address psychosocial, clinical,  and financial barriers to discharge as identified in assessment in conjunction with the patient/family and health care team  - Arrange appropriate level of post-acute services according to patient’s   needs and preference and payer coverage in collaboration with the physician and health care team  - Communicate with and update the patient/family, physician, and health care team regarding progress on the discharge plan  - Arrange appropriate transportation to post-acute venues  Outcome: Progressing     Problem: Knowledge Deficit  Goal: Patient/family/caregiver demonstrates understanding of disease process, treatment plan, medications, and discharge instructions  Description: Complete learning assessment and assess knowledge base.  Interventions:  - Provide teaching at level of understanding  - Provide teaching via preferred learning methods  Outcome: Progressing     Problem: SLEEP DISTURBANCE  Goal: Will exhibit normal sleeping pattern  Description: Interventions:  -  Assess the patients sleep pattern, noting recent changes  - Administer medication as ordered  - Decrease environmental stimuli, including noise, as appropriate during the night  - Encourage the patient to actively participate in unit groups and or exercise during the day to enhance ability to achieve adequate sleep at night  - Assess the patient, in the morning, encouraging a description of sleep experience  Outcome: Progressing

## 2024-11-28 NOTE — PROGRESS NOTES
"    Psychiatric Progress Note - Department of Behavioral Health   Franco Roberson 39 y.o. male MRN: [id number]  Unit/Bed#: [facility] 347-02 Encounter: [id number]    ASSESSMENT & PLAN     Assessment & Plan  MDD (major depressive disorder), recurrent severe, without psychosis (HCC)  No medications changes at this time, optimize as indicated;  Patient continues to be pending group home placement. No acute concerns today. Pt denied SSI and SSD, CM cont to work on personal care home placement and assisting pt w/ finances. Worked w/ SW to place money in [employer] account. New meeting w/ [facility] in Dec.    Abilify 5 mg daily as mood adjunct   Zoloft 150 mg daily for depression and anxiety  Continue to encourage participation in group therapy, milieu therapy and occupational therapy.  Continue to assess for side effects of medications.  Continue collaboration with PATRICIA for medical co-morbidities as indicated.  Continue discussion with CM/SW to assist with obtaining collateral, disposition planning, and the implementation of patient-centered individualized plan of care.  Continue frequent safety checks and vitals per unit protocol.    Risks, benefits and possible side effects of Medications: Risks, benefits, and possible side effects of medications have previously been explained. No new medications at this time.      Legal status: 201    Disposition: to be determined, pending SOAR application for potential group home placement. OT Cognitive Evaluation completed: \"Pt would benefit from discharge to a supportive environment that can provide checks for safety and compliance with IADLs. \"   Although patient's mood has stabilized, they are currently awaiting group home placement.  Their ability to recognize safety hazards take medications and participate in IADLs are significantly impaired by their cognitive deficits compounded by their chronic mental health needs and would be at risk for significant decompensation without the structure and support " "of a group home setting.      No associated orders from this encounter found during lookback period of 72 hours.  Autism spectrum disorder  Continue supportive care    No associated orders from this encounter found during lookback period of 72 hours.        SUBJECTIVE     Patient evaluated this a.m. for continuity of care. Patient was discussed at length with nursing and treatment team. Per nursing, patient continues to have a depressed affect but has been in good behavioral control.    Patient reported today that his mood is \"pretty good.\"  He is endorsing adequate sleep and appetite.  He denied medication side effects.  He denied suicidal ideation.  He denied homicidal ideation.  He denied auditory and visual hallucinations.    PSYCHIATRIC REVIEW OF SYSTEMS     Behavior over the last 24 hours:  unchanged  Sleep: normal  Appetite: normal  Medication side effects: No    REVIEW OF SYSTEMS   Review of systems: no complaints    OBJECTIVE     Vital Signs in Past 24 Hours:  Temp:  [96.8 °F (36 °C)-97.8 °F (36.6 °C)] 97.8 °F (36.6 °C)  HR:  [81-93] 93  BP: (111-123)/(61-72) 111/61  Resp:  [16-17] 16  SpO2:  [94 %-96 %] 94 %  O2 Device: None (Room air)    Intake/Output in Past 24 hours:  No intake/output data recorded.  No intake/output data recorded.        Laboratory Results:  I have personally reviewed all pertinent laboratory/tests results.    Behavioral Health Medications: all current active meds have been reviewed.    Mental Status Evaluation:  Appearance:  Disheveled, poor grooming/hygiene, appears older than stated age, overtly  male, dressed casually   Behavior:  Pleasant and cooperative   Speech:  Normal rate and volume   Mood:  \"Pretty good.\"   Affect:  Blunted, mood incongruent   Language naming objects and repeating phrases   Thought Process:  concrete   Thought Content:  No overt delusions or paranoia.   Perceptual Disturbances: Denied auditory and visual hallucinations.  Does not appear to be " responding to internal stimuli.   Risk Potential: Suicidal Ideations none, Homicidal Ideations none, and Potential for Aggression No   Sensorium:  person, place, time/date, and situation   Cognition:  recent and remote memory grossly intact   Consciousness:  alert and awake    Attention: attention span and concentration were age appropriate   Insight:  limited   Judgment: limited   Intellect Not assessed   Gait/Station: normal gait/station   Motor Activity: no abnormal movements     Memory: Short and long term memory  intact     Progress Toward Goals: Unchanged, as evidenced by their participation (or lack thereof) in individual, social and therapeutic milieu in addition to adherence to their medication regimen.    Recommended Treatment:   See above for assessment and plan.    Inpatient Psychiatric Certification: Based upon physical, mental and social evaluations, I certify that inpatient psychiatric services are medically necessary for this patient for a duration of greater than 2 midnights for the treatment of MDD (major depressive disorder), recurrent severe, without psychosis (HCC) including psychotropic medication management, participation in the therapeutic milieu and referrals as indicated. Available alternative community resources do not meet the patient's mental health care needs. I further attest that an established written individualized plan of care has been implemented and is outlined in the patient's medical records.    Counseling/Coordination of Care    I have expended greater than 15 minutes in which more than 50% of this time was expended in counseling/coordination of patient care relating to diagnostic results, prognosis, potential risks and benefits of management options, instructions for appropriate management, patient and/or collateral education, importance of adherence to management and/or risk factor reductions. Patient's rights, confidentiality, exceptions to confidentiality, use of  electronic medical record including appropriate staff access to medical record regarding behavioral health services and consent to treatment were reviewed.    Note Share:     This note was not shared with the patient due to reasonable likelihood of causing patient harm     This note has been constructed using a voice recognition system. There may be translation, syntax,  or grammatical errors. If you have any questions, please contact the dictating provider.    Jaret Rodriguez, DO 11/28/24   PGY-II

## 2024-11-28 NOTE — NURSING NOTE
Patient is calm and cooperative. He is withdrawn to self, presents with depressed mood and flat affect but brightens on approach. Patient denies SI/HI/AVH. No complaints or unmet needs identified at this time. Q 15 minute safety checks maintained.

## 2024-11-28 NOTE — NURSING NOTE
Patient is calm and cooperative upon approach. Patient isolative to room and self. Patient denies si/hi/ah/vh. Patient is compliant with meds and meals.

## 2024-11-28 NOTE — NURSING NOTE
Patient is calm and cooperative, periodically visible. Brightens on approach. Patient appears depressed. Medication and meal compliant. Denies SI/HI/AVH and pain. Denies any unmet needs at this time.

## 2024-11-29 PROCEDURE — 99232 SBSQ HOSP IP/OBS MODERATE 35: CPT | Performed by: PSYCHIATRY & NEUROLOGY

## 2024-11-29 RX ADMIN — ATORVASTATIN CALCIUM 10 MG: 10 TABLET, FILM COATED ORAL at 17:46

## 2024-11-29 RX ADMIN — SERTRALINE HYDROCHLORIDE 150 MG: 100 TABLET ORAL at 08:12

## 2024-11-29 RX ADMIN — ARIPIPRAZOLE 5 MG: 5 TABLET ORAL at 08:12

## 2024-11-29 RX ADMIN — SENNOSIDES AND DOCUSATE SODIUM 2 TABLET: 50; 8.6 TABLET ORAL at 17:46

## 2024-11-29 RX ADMIN — CYANOCOBALAMIN TAB 1000 MCG 1000 MCG: 1000 TAB at 08:12

## 2024-11-29 RX ADMIN — Medication 2500 UNITS: at 08:12

## 2024-11-29 RX ADMIN — FAMOTIDINE 20 MG: 20 TABLET ORAL at 08:12

## 2024-11-29 RX ADMIN — FAMOTIDINE 20 MG: 20 TABLET ORAL at 17:46

## 2024-11-29 RX ADMIN — LEVOTHYROXINE SODIUM 37.5 MCG: 125 TABLET ORAL at 06:45

## 2024-11-29 NOTE — ASSESSMENT & PLAN NOTE
"No medications changes at this time, optimize as indicated;  Patient continues to be pending group home placement. No acute concerns today. Pt denied SSI and SSD, CM cont to work on personal care home placement and assisting pt w/ finances. Worked w/ SW to place money in [employer] account. Pending [facility] meeting next month.    Abilify 5 mg daily as mood adjunct   Zoloft 150 mg daily for depression and anxiety  Continue to encourage participation in group therapy, milieu therapy and occupational therapy.  Continue to assess for side effects of medications.  Continue collaboration with PATRICIA for medical co-morbidities as indicated.  Continue discussion with CM/SW to assist with obtaining collateral, disposition planning, and the implementation of patient-centered individualized plan of care.  Continue frequent safety checks and vitals per unit protocol.    Risks, benefits and possible side effects of Medications: Risks, benefits, and possible side effects of medications have previously been explained. No new medications at this time.      Legal status: 201    Disposition: to be determined, pending SOAR application for potential group home placement. OT Cognitive Evaluation completed: \"Pt would benefit from discharge to a supportive environment that can provide checks for safety and compliance with IADLs. \"   Although patient's mood has stabilized, they are currently awaiting group home placement.  Their ability to recognize safety hazards take medications and participate in IADLs are significantly impaired by their cognitive deficits compounded by their chronic mental health needs and would be at risk for significant decompensation without the structure and support of a group home setting.      No associated orders from this encounter found during lookback period of 72 hours.  "

## 2024-11-29 NOTE — PROGRESS NOTES
11/29/24 0843   Team Meeting   Meeting Type Daily Rounds   Team Members Present   Team Members Present Physician;Nurse;   Physician Team Member Ary   [facility] Suhas   Patient/Family Present   Patient Present No   Patient's Family Present No     Pt med/meal compliant. Visible on the unit. Social with select peers and staff. Discharge to be determined.

## 2024-11-29 NOTE — PLAN OF CARE
Individual session completed with pt. Pt able to identify areas he would like to work on. Doubts and self-limiting beliefs regarding his future after discharge being discussed.

## 2024-11-29 NOTE — PROGRESS NOTES
"Progress Note - Behavioral Health   Name: Franco Roberson 39 y.o. male I MRN: [id number]  Unit/Bed#: -02 I Date of Admission: 5/14/2024   Date of Service: 11/29/2024 I Hospital Day: 199     Assessment & Plan  MDD (major depressive disorder), recurrent severe, without psychosis (HCC)  No medications changes at this time, optimize as indicated;  Patient continues to be pending group home placement. No acute concerns today. Pt denied SSI and SSD, CM cont to work on personal care home placement and assisting pt w/ finances. Worked w/ SW to place money in [employer] account. Pending SSC meeting next month.    Abilify 5 mg daily as mood adjunct   Zoloft 150 mg daily for depression and anxiety  Continue to encourage participation in group therapy, milieu therapy and occupational therapy.  Continue to assess for side effects of medications.  Continue collaboration with SLIM for medical co-morbidities as indicated.  Continue discussion with CM/SW to assist with obtaining collateral, disposition planning, and the implementation of patient-centered individualized plan of care.  Continue frequent safety checks and vitals per unit protocol.    Risks, benefits and possible side effects of Medications: Risks, benefits, and possible side effects of medications have previously been explained. No new medications at this time.      Legal status: 201    Disposition: to be determined, pending SOAR application for potential group home placement. OT Cognitive Evaluation completed: \"Pt would benefit from discharge to a supportive environment that can provide checks for safety and compliance with IADLs. \"   Although patient's mood has stabilized, they are currently awaiting group home placement.  Their ability to recognize safety hazards take medications and participate in IADLs are significantly impaired by their cognitive deficits compounded by their chronic mental health needs and would be at risk for significant decompensation without the " structure and support of a group home setting.      No associated orders from this encounter found during lookback period of 72 hours.  Autism spectrum disorder  Continue supportive care    No associated orders from this encounter found during lookback period of 72 hours.        Plan     Recommended Treatment:    - Encourage early mobility and having a structured day  - Provide frequent re-orientation, and cognitive stimulation  - Ensure assistive devices are in proper working order (eye-glasses, hearing aids)  - Encourage adequate hydration, nutrition and monitor bowel movements  - Maintain sleep-wake cycle: Uninterrupted sleep time; low-level lighting at night  - Fall precaution  - f/u SLIM recs regarding the medical problems   - Continue medication titration and treatment plan; adjust medication to optimize treatment response and as clinically indicated. .   - Observation: routine            - VS: as per unit protocol  - Diet: Regular diet  - Encourage group attendance and milieu therapy  - Dispo: To be determined     Scheduled medications:  Current Facility-Administered Medications   Medication Dose Route Frequency Provider Last Rate    acetaminophen  650 mg Oral Q6H PRN Basilio Brown MD      acetaminophen  650 mg Oral Q4H PRN Basilio Brown MD      acetaminophen  975 mg Oral Q6H PRN Basilio Brown MD      aluminum-magnesium hydroxide-simethicone  30 mL Oral Q4H PRN Basilio Brown MD      ARIPiprazole  5 mg Oral Daily Basilio Panda MD      Artificial Tears  1 drop Both Eyes Q3H PRN Basilio Brown MD      atorvastatin  10 mg Oral Daily With Dinner WALLY Baptiste      benztropine  1 mg Intramuscular Q4H PRN Max 6/day Basilio Brown MD      benztropine  1 mg Oral Q4H PRN Max 6/day Basilio Brown MD      Cholecalciferol  2,500 Units Oral Daily Dustin Mcallister MD      cyanocobalamin  1,000 mcg Oral Daily WALLY Baptiste      Diclofenac Sodium  2 g  Topical 4x Daily PRN Laura Ashford, WALLY      hydrOXYzine HCL  50 mg Oral Q6H PRN Max 4/day Basilio Brown MD      Or    diphenhydrAMINE  50 mg Intramuscular Q6H PRN Basilio Brown MD      diphenhydrAMINE-zinc acetate   Topical Daily PRN Raj Guerrero MD      famotidine  20 mg Oral BID Laura Amber Ashford, WALLY      hydrOXYzine HCL  100 mg Oral Q6H PRN Max 4/day Basilio Brown MD      Or    LORazepam  2 mg Intramuscular Q6H PRN Basilio Brown MD      hydrOXYzine HCL  25 mg Oral Q6H PRN Max 4/day Basilio Brown MD      levothyroxine  37.5 mcg Oral Early Morning Laura Ashford, ARJUNNP      melatonin  6 mg Oral HS PRN WALLY Gomez      methocarbamol  500 mg Oral Q6H PRN WALLY Baptiste      OLANZapine  5 mg Oral Q4H PRN Max 3/day Basilio Brown MD      Or    OLANZapine  2.5 mg Intramuscular Q4H PRN Max 3/day Basilio Brown MD      OLANZapine  5 mg Oral Q3H PRN Max 3/day Basilio Brown MD      Or    OLANZapine  5 mg Intramuscular Q3H PRN Max 3/day Basilio Brown MD      OLANZapine  2.5 mg Oral Q4H PRN Max 6/day Basilio Brown MD      polyethylene glycol  17 g Oral Daily PRN Basilio Brown MD      propranolol  10 mg Oral Q8H PRN Basilio Brown MD      senna-docusate sodium  2 tablet Oral After Dinner Dustin Mcallister MD      sertraline  150 mg Oral Daily WALLY Gomez      traZODone  50 mg Oral HS PRN Basilio Brown MD        PRN:    acetaminophen    acetaminophen    acetaminophen    aluminum-magnesium hydroxide-simethicone    Artificial Tears    benztropine    benztropine    Diclofenac Sodium    hydrOXYzine HCL **OR** diphenhydrAMINE    diphenhydrAMINE-zinc acetate    hydrOXYzine HCL **OR** LORazepam    hydrOXYzine HCL    melatonin    methocarbamol    OLANZapine **OR** OLANZapine    OLANZapine **OR** OLANZapine    OLANZapine    polyethylene glycol    propranolol    traZODone       Subjective    "  Patient was visited on unit for continuing care; chart reviewed and discussed with multidisciplinary treatment team.  On approach, the patient was calm and cooperative. Denied any changes in mood, appetite, and energy level.  Remains hopeful regarding DC options and is aware of meeting with Social Security early in in December.  No problem initiating and maintaining sleep.  Denied A/VH currently.  Denied SI/HI, intent or plan upon direct inquiry at this time.    Patient continues to be visible in the milieu and interacts with staff and peers. No reports of aggression or self-injurious behavior on unit. No PRN medications used in the past 24 hours.    Patient accepted all offered medications and no adverse effects of medications noted or reported.    Objective    Current Mental Status Evaluation:  Appearance: casually dressed, consistent with stated age  Motor: no psychomotor retardation, no gait abnormalities  Behavior: cooperative, answers questions appropriately  Speech: soft, normal rhythm  Mood: \"ok\"  Affect: blunted  Thought Process: concrete but linear  Thought Content: denies delusions or paranoia  Risk Potential: denies suicidal ideation, plan, or intent. Denies homicidal ideation  Perceptions: denies auditory hallucinations, denies visual hallucinations,   Sensorium: Oriented to person, place, time, and situation  Cognition: cognitive ability appears intact but was not quantitatively tested  Consciousness: alert and awake  Attention: intact, able to focus without difficulty  Insight: limited  Judgement: limited            Vital signs in last 24 hours:    Temp:  [96.8 °F (36 °C)-97.5 °F (36.4 °C)] 97.5 °F (36.4 °C)  HR:  [68-71] 68  BP: (111-115)/(73-74) 111/73  Resp:  [16-17] 16  SpO2:  [96 %] 96 %  O2 Device: None (Room air)    Psychiatric Review of Systems:  Medication adverse effects: none  Sleep: unchanged  Appetite: unchanged  Behaviors over the past 24 hours: unchanged    Laboratory results:    I " have personally reviewed all pertinent laboratory/tests results  No results found for this or any previous visit (from the past 48 hours).       Progress Toward Goals & Illness Status:   progressing, mood is stabilizing, discharge planning, placement pending    Patient is not at goal. They are not yet ready for discharge. The patient's condition currently requires active psychopharmacological medication management, interdisciplinary coordination with case management, and the utilization of adjunctive milieu and group therapy to augment psychopharmacological efficacy. The patient's risk of morbidity, and progression or decompensation of psychiatric disease, is higher without this current treatment.     Next of Kin  Extended Emergency Contact Information  Primary Emergency Contact: Lois Roberson  Address: [address]           [place] [id number] [place] [address] [place]  Home Phone: [phone]  Relation: Mother    Counseling / Coordination of Care  Patient's progress discussed with staff in treatment team meeting.  Medications, treatment progress and treatment plan reviewed with patient.  Medication education provided to patient.  Educated on importance of medication and treatment compliance.  Supportive therapy provided to patient.  Cognitive techniques utilized during the session.  Reassurance and supportive therapy provided.  Reoriented to reality and reassured.  Encouraged participation in milieu and group therapy on the unit.  Crisis/safety plan discussed with patient.       Dustin Mcallister MD  Attending Psychiatrist   [facility]      This note has been constructed using a voice recognition system. There may be translation, syntax, or grammatical errors. If you have any questions, please contact the dictating provider.

## 2024-11-29 NOTE — PLAN OF CARE
Problem: Ineffective Coping  Goal: Participates in unit activities  Description: Interventions:  - Provide therapeutic environment   - Provide required programming   - Redirect inappropriate behaviors   Outcome: Progressing     Problem: Depression  Goal: Treatment Goal: Demonstrate behavioral control of depressive symptoms, verbalize feelings of improved mood/affect, and adopt new coping skills prior to discharge  Outcome: Progressing  Goal: Verbalize thoughts and feelings  Description: Interventions:  - Assess and re-assess patient's level of risk   - Engage patient in 1:1 interactions, daily, for a minimum of 15 minutes   - Encourage patient to express feelings, fears, frustrations, hopes   Outcome: Progressing  Goal: Refrain from harming self  Description: Interventions:  - Monitor patient closely, per order   - Supervise medication ingestion, monitor effects and side effects   Outcome: Progressing  Goal: Refrain from isolation  Description: Interventions:  - Develop a trusting relationship   - Encourage socialization   Outcome: Progressing     Problem: Anxiety  Goal: Anxiety is at manageable level  Description: Interventions:  - Assess and monitor patient's anxiety level.   - Monitor for signs and symptoms (heart palpitations, chest pain, shortness of breath, headaches, nausea, feeling jumpy, restlessness, irritable, apprehensive).   - Collaborate with interdisciplinary team and initiate plan and interventions as ordered.  - [place] patient to unit/surroundings  - Explain treatment plan  - Encourage participation in care  - Encourage verbalization of concerns/fears  - Identify coping mechanisms  - Assist in developing anxiety-reducing skills  - Administer/offer alternative therapies  - Limit or eliminate stimulants  Outcome: Progressing     Problem: DISCHARGE PLANNING - CARE MANAGEMENT  Goal: Discharge to post-acute care or home with appropriate resources  Description: INTERVENTIONS:  - Conduct assessment to  determine patient/family and health care team treatment goals, and need for post-acute services based on payer coverage, community resources, and patient preferences, and barriers to discharge  - Address psychosocial, clinical, and financial barriers to discharge as identified in assessment in conjunction with the patient/family and health care team  - Arrange appropriate level of post-acute services according to patient’s   needs and preference and payer coverage in collaboration with the physician and health care team  - Communicate with and update the patient/family, physician, and health care team regarding progress on the discharge plan  - Arrange appropriate transportation to post-acute venues  Outcome: Progressing     Problem: Knowledge Deficit  Goal: Patient/family/caregiver demonstrates understanding of disease process, treatment plan, medications, and discharge instructions  Description: Complete learning assessment and assess knowledge base.  Interventions:  - Provide teaching at level of understanding  - Provide teaching via preferred learning methods  Outcome: Progressing     Problem: SLEEP DISTURBANCE  Goal: Will exhibit normal sleeping pattern  Description: Interventions:  -  Assess the patients sleep pattern, noting recent changes  - Administer medication as ordered  - Decrease environmental stimuli, including noise, as appropriate during the night  - Encourage the patient to actively participate in unit groups and or exercise during the day to enhance ability to achieve adequate sleep at night  - Assess the patient, in the morning, encouraging a description of sleep experience  Outcome: Progressing

## 2024-11-29 NOTE — NURSING NOTE
Pt is calm and cooperative upon approach. Visible at times, brightens upon approach. Medication and meal compliant. Denies SI, HI, AH, and VH. Denies any needs at this time.

## 2024-11-30 PROCEDURE — 99232 SBSQ HOSP IP/OBS MODERATE 35: CPT | Performed by: PSYCHIATRY & NEUROLOGY

## 2024-11-30 RX ADMIN — ARIPIPRAZOLE 5 MG: 5 TABLET ORAL at 08:46

## 2024-11-30 RX ADMIN — SERTRALINE HYDROCHLORIDE 150 MG: 100 TABLET ORAL at 08:47

## 2024-11-30 RX ADMIN — Medication 2500 UNITS: at 08:40

## 2024-11-30 RX ADMIN — SENNOSIDES AND DOCUSATE SODIUM 2 TABLET: 50; 8.6 TABLET ORAL at 17:24

## 2024-11-30 RX ADMIN — FAMOTIDINE 20 MG: 20 TABLET ORAL at 17:24

## 2024-11-30 RX ADMIN — LEVOTHYROXINE SODIUM 37.5 MCG: 125 TABLET ORAL at 06:35

## 2024-11-30 RX ADMIN — ATORVASTATIN CALCIUM 10 MG: 10 TABLET, FILM COATED ORAL at 16:53

## 2024-11-30 RX ADMIN — FAMOTIDINE 20 MG: 20 TABLET ORAL at 08:47

## 2024-11-30 RX ADMIN — CYANOCOBALAMIN TAB 1000 MCG 1000 MCG: 1000 TAB at 08:46

## 2024-11-30 NOTE — NURSING NOTE
Patient is isolative to room, out for needs. Patient appears disheveled and malodorous. ADLs encouraged. Patient is compliant with scheduled medications. Patient denies SI/HI/AVH. Q15 observation maintained.

## 2024-11-30 NOTE — NURSING NOTE
"Pt denies depression and anxiety in spite of declines in his appearance.  Pt denies HI/SI/AVH, he is visible an pleasant during interactions with staff.  Pt stated,  I have my ups and downs,\"  and was acknowledged by this writer in person for is refusal to give up his placement goals.  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  Pt denies HI/SI/AVH and cis compliant with all meds.  Pt has not reported unmet needs at this point.    "

## 2024-11-30 NOTE — NURSING NOTE
Patient isolative to self and room. Patient is calm and cooperative upon approach. Patient denies SI/HI/AH/VH. Patient is compliant with meds and meals.

## 2024-11-30 NOTE — PLAN OF CARE
Problem: Ineffective Coping  Goal: Participates in unit activities  Description: Interventions:  - Provide therapeutic environment   - Provide required programming   - Redirect inappropriate behaviors   Outcome: Progressing     Problem: Depression  Goal: Treatment Goal: Demonstrate behavioral control of depressive symptoms, verbalize feelings of improved mood/affect, and adopt new coping skills prior to discharge  Outcome: Progressing  Goal: Verbalize thoughts and feelings  Description: Interventions:  - Assess and re-assess patient's level of risk   - Engage patient in 1:1 interactions, daily, for a minimum of 15 minutes   - Encourage patient to express feelings, fears, frustrations, hopes   Outcome: Progressing  Goal: Refrain from harming self  Description: Interventions:  - Monitor patient closely, per order   - Supervise medication ingestion, monitor effects and side effects   Outcome: Progressing  Goal: Refrain from isolation  Description: Interventions:  - Develop a trusting relationship   - Encourage socialization   Outcome: Progressing     Problem: Anxiety  Goal: Anxiety is at manageable level  Description: Interventions:  - Assess and monitor patient's anxiety level.   - Monitor for signs and symptoms (heart palpitations, chest pain, shortness of breath, headaches, nausea, feeling jumpy, restlessness, irritable, apprehensive).   - Collaborate with interdisciplinary team and initiate plan and interventions as ordered.  - [place] patient to unit/surroundings  - Explain treatment plan  - Encourage participation in care  - Encourage verbalization of concerns/fears  - Identify coping mechanisms  - Assist in developing anxiety-reducing skills  - Administer/offer alternative therapies  - Limit or eliminate stimulants  Outcome: Progressing     Problem: DISCHARGE PLANNING - CARE MANAGEMENT  Goal: Discharge to post-acute care or home with appropriate resources  Description: INTERVENTIONS:  - Conduct assessment to  determine patient/family and health care team treatment goals, and need for post-acute services based on payer coverage, community resources, and patient preferences, and barriers to discharge  - Address psychosocial, clinical, and financial barriers to discharge as identified in assessment in conjunction with the patient/family and health care team  - Arrange appropriate level of post-acute services according to patient’s   needs and preference and payer coverage in collaboration with the physician and health care team  - Communicate with and update the patient/family, physician, and health care team regarding progress on the discharge plan  - Arrange appropriate transportation to post-acute venues  Outcome: Progressing     Problem: Knowledge Deficit  Goal: Patient/family/caregiver demonstrates understanding of disease process, treatment plan, medications, and discharge instructions  Description: Complete learning assessment and assess knowledge base.  Interventions:  - Provide teaching at level of understanding  - Provide teaching via preferred learning methods  Outcome: Progressing     Problem: SLEEP DISTURBANCE  Goal: Will exhibit normal sleeping pattern  Description: Interventions:  -  Assess the patients sleep pattern, noting recent changes  - Administer medication as ordered  - Decrease environmental stimuli, including noise, as appropriate during the night  - Encourage the patient to actively participate in unit groups and or exercise during the day to enhance ability to achieve adequate sleep at night  - Assess the patient, in the morning, encouraging a description of sleep experience  Outcome: Progressing

## 2024-11-30 NOTE — PROGRESS NOTES
"    Psychiatric Progress Note - Department of Behavioral Health   Franco Roberson 39 y.o. male MRN: [id number]  Unit/Bed#: [facility] 347-02 Encounter: [id number]    ASSESSMENT & PLAN     Assessment & Plan  MDD (major depressive disorder), recurrent severe, without psychosis (HCC)  No medications changes at this time, optimize as indicated;  Patient continues to be pending group home placement. No acute concerns today. Pt denied SSI and SSD, CM cont to work on personal care home placement and assisting pt w/ finances. Worked w/ SW to place money in [employer] account. Pending SSC meeting next month.    Abilify 5 mg daily as mood adjunct   Zoloft 150 mg daily for depression and anxiety  Continue to encourage participation in group therapy, milieu therapy and occupational therapy.  Continue to assess for side effects of medications.  Continue collaboration with PATRICIA for medical co-morbidities as indicated.  Continue discussion with CM/SW to assist with obtaining collateral, disposition planning, and the implementation of patient-centered individualized plan of care.  Continue frequent safety checks and vitals per unit protocol.    Risks, benefits and possible side effects of Medications: Risks, benefits, and possible side effects of medications have previously been explained. No new medications at this time.      Legal status: 201    Disposition: to be determined, pending SOAR application for potential group home placement. OT Cognitive Evaluation completed: \"Pt would benefit from discharge to a supportive environment that can provide checks for safety and compliance with IADLs. \"   Although patient's mood has stabilized, they are currently awaiting group home placement.  Their ability to recognize safety hazards take medications and participate in IADLs are significantly impaired by their cognitive deficits compounded by their chronic mental health needs and would be at risk for significant decompensation without the structure and " support of a group home setting.      No associated orders from this encounter found during lookback period of 72 hours.  Autism spectrum disorder  Continue supportive care    No associated orders from this encounter found during lookback period of 72 hours.      Treatment Recommendations/Precautions:  Continue to promote patient participation in therapeutic milieu.  Continue medical management per medicine.  Continue previously prescribed psychotropic medication regimen; see below.  Continue behavioral health checks q.15 minutes.   Continue vitals per behavioral health unit protocol.  Discharge date per primary team; 201 commitment status.    SUBJECTIVE     Patient evaluated this a.m. for continuity of care. Patient was discussed at length with nursing and treatment team. Per nursing, patient remains calm, cooperative, intermittently interactive in the milieu, adherent to his medications less any acute adverse effects. No acute distress is noted throughout evaluation. Franco Roberson denies suicidal/homicidal ideation in addition to thoughts of self-injury, receptive to crisis planning provided by this writer, contacting for safety in the inpatient setting, admitting to an ability to appropriately confide in staff including this writer. Patient appears somewhat superficial on approach, scant, sparse, denying any/all psychiatric complaints/concerns,     PSYCHIATRIC REVIEW OF SYSTEMS     Behavior over the last 24 hours:  unchanged  Sleep: adequate  Appetite: adequate  Medication side effects: No    REVIEW OF SYSTEMS   Review of systems: no complaints    OBJECTIVE     Vital Signs in Past 24 Hours:  Temp:  [97.4 °F (36.3 °C)-98.4 °F (36.9 °C)] 97.4 °F (36.3 °C)  HR:  [59-71] 59  BP: (110-123)/(65-70) 110/65  Resp:  [16-17] 16  SpO2:  [97 %] 97 %  O2 Device: None (Room air)    Intake/Output in Past 24 hours:  No intake/output data recorded.  No intake/output data recorded.        Laboratory Results:  I have personally  reviewed all pertinent laboratory/tests results.  Most Recent Labs:   Lab Results   Component Value Date    WBC 6.75 11/12/2024    RBC 5.06 11/12/2024    HGB 15.9 11/12/2024    HCT 46.2 11/12/2024     11/12/2024    RDW 12.8 11/12/2024    NEUTROABS 4.15 11/12/2024    SODIUM 139 11/12/2024    K 4.0 11/12/2024     11/12/2024    CO2 30 11/12/2024    BUN 17 11/12/2024    CREATININE 0.99 11/12/2024    GLUC 85 11/12/2024    GLUF 85 11/12/2024    CALCIUM 9.4 11/12/2024    AST 23 11/12/2024    ALT 32 11/12/2024    ALKPHOS 73 11/12/2024    TP 8.1 11/12/2024    ALB 4.4 11/12/2024    TBILI 0.54 11/12/2024    CHOLESTEROL 151 11/26/2024    HDL 39 (L) 11/26/2024    TRIG 170 (H) 11/26/2024    LDLCALC 78 11/26/2024    NONHDLC 112 11/26/2024    [id number] 2.532 11/12/2024    FREET4 0.56 (L) 09/10/2024       Behavioral Health Medications: all current active meds have been reviewed and current meds:   Current Facility-Administered Medications:     acetaminophen (TYLENOL) tablet 650 mg, Q6H PRN    acetaminophen (TYLENOL) tablet 650 mg, Q4H PRN    acetaminophen (TYLENOL) tablet 975 mg, Q6H PRN    aluminum-magnesium hydroxide-simethicone (MAALOX) oral suspension 30 mL, Q4H PRN    ARIPiprazole (ABILIFY) tablet 5 mg, Daily    Artificial Tears ophthalmic solution 1 drop, Q3H PRN    atorvastatin (LIPITOR) tablet 10 mg, Daily With Dinner    benztropine (COGENTIN) injection 1 mg, Q4H PRN Max 6/day    benztropine (COGENTIN) tablet 1 mg, Q4H PRN Max 6/day    Cholecalciferol (VITAMIN D3) tablet 2,500 Units, Daily    cyanocobalamin (VITAMIN B-12) tablet 1,000 mcg, Daily    Diclofenac Sodium (VOLTAREN) 1 % topical gel 2 g, 4x Daily PRN    hydrOXYzine HCL (ATARAX) tablet 50 mg, Q6H PRN Max 4/day **OR** diphenhydrAMINE (BENADRYL) injection 50 mg, Q6H PRN    diphenhydrAMINE-zinc acetate (BENADRYL) 2-0.1 % cream, Daily PRN    famotidine (PEPCID) tablet 20 mg, BID    hydrOXYzine HCL (ATARAX) tablet 100 mg, Q6H PRN Max 4/day **OR**  LORazepam (ATIVAN) injection 2 mg, Q6H PRN    hydrOXYzine HCL (ATARAX) tablet 25 mg, Q6H PRN Max 4/day    levothyroxine tablet 37.5 mcg, Early Morning    melatonin tablet 6 mg, HS PRN    methocarbamol (ROBAXIN) tablet 500 mg, Q6H PRN    OLANZapine (ZyPREXA) tablet 5 mg, Q4H PRN Max 3/day **OR** OLANZapine (ZyPREXA) IM injection 2.5 mg, Q4H PRN Max 3/day    OLANZapine (ZyPREXA) tablet 5 mg, Q3H PRN Max 3/day **OR** OLANZapine (ZyPREXA) IM injection 5 mg, Q3H PRN Max 3/day    OLANZapine (ZyPREXA) tablet 2.5 mg, Q4H PRN Max 6/day    polyethylene glycol (MIRALAX) packet 17 g, Daily PRN    propranolol (INDERAL) tablet 10 mg, Q8H PRN    senna-docusate sodium (SENOKOT S) 8.6-50 mg per tablet 2 tablet, After Dinner    sertraline (ZOLOFT) tablet 150 mg, Daily    traZODone (DESYREL) tablet 50 mg, HS PRN.    Risks, benefits and possible side effects of Medications:   Risks, benefits, and possible side effects of medications explained to patient and patient verbalizes understanding.        Mental Status Evaluation:  Appearance:  age appropriate, casually dressed, and somewhat disheveled   Behavior:  psychomotor retardation   Speech:  normal pitch and normal volume   Mood:  euthymic   Affect:  blunted and mood-incongruent   Language sparse   Thought Process:  concrete   Thought Content:  No overt obsessions or delusions   Perceptual Disturbances: None   Risk Potential: Suicidal Ideations none, Homicidal Ideations none, and Potential for Aggression No   Sensorium:  person, place, and time/date   Cognition:  recent and remote memory grossly intact   Consciousness:  alert and awake    Attention: attention span appeared shorter than expected for age   Insight:  limited   Judgment: limited   Intellect Not assessed   Gait/Station: normal gait/station and normal balance   Motor Activity: no abnormal movements     Memory: Short and long term memory  fair     Progress Toward Goals: unchanged, as evidenced by their participation (or lack  thereof) in individual, social and therapeutic milieu in addition to adherence to their medication regimen.    Recommended Treatment:   See above for assessment and plan.    Inpatient Psychiatric Certification: Based upon physical, mental and social evaluations, I certify that inpatient psychiatric services are medically necessary for this patient for a duration of greater than 2 midnights for the treatment of MDD (major depressive disorder), recurrent severe, without psychosis (HCC) including psychotropic medication management, participation in the therapeutic milieu and referrals as indicated. Available alternative community resources do not meet the patient's mental health care needs. I further attest that an established written individualized plan of care has been implemented and is outlined in the patient's medical records.    Counseling/Coordination of Care    I have expended greater than 15 minutes in which more than 50% of this time was expended in counseling/coordination of patient care relating to diagnostic results, prognosis, potential risks and benefits of management options, instructions for appropriate management, patient and/or collateral education, importance of adherence to management and/or risk factor reductions. Patient's rights, confidentiality, exceptions to confidentiality, use of electronic medical record including appropriate staff access to medical record regarding behavioral health services and consent to treatment were reviewed.    Note Share:     This note was not shared with the patient due to reasonable likelihood of causing patient harm     This note has been constructed using a voice recognition system. There may be translation, syntax,  or grammatical errors. If you have any questions, please contact the dictating provider.    Jordan Christopher Holter, DO 11/30/24

## 2024-12-01 PROCEDURE — 99232 SBSQ HOSP IP/OBS MODERATE 35: CPT | Performed by: PSYCHIATRY & NEUROLOGY

## 2024-12-01 RX ADMIN — SENNOSIDES AND DOCUSATE SODIUM 2 TABLET: 50; 8.6 TABLET ORAL at 18:41

## 2024-12-01 RX ADMIN — CYANOCOBALAMIN TAB 1000 MCG 1000 MCG: 1000 TAB at 09:49

## 2024-12-01 RX ADMIN — FAMOTIDINE 20 MG: 20 TABLET ORAL at 18:41

## 2024-12-01 RX ADMIN — ATORVASTATIN CALCIUM 10 MG: 10 TABLET, FILM COATED ORAL at 18:40

## 2024-12-01 RX ADMIN — ARIPIPRAZOLE 5 MG: 5 TABLET ORAL at 09:49

## 2024-12-01 RX ADMIN — SERTRALINE HYDROCHLORIDE 150 MG: 100 TABLET ORAL at 09:49

## 2024-12-01 RX ADMIN — LEVOTHYROXINE SODIUM 37.5 MCG: 125 TABLET ORAL at 06:14

## 2024-12-01 RX ADMIN — FAMOTIDINE 20 MG: 20 TABLET ORAL at 09:49

## 2024-12-01 RX ADMIN — Medication 2500 UNITS: at 09:49

## 2024-12-01 NOTE — NURSING NOTE
Patient was assessed in room during medication administration, patient reports no AVH/SI/HI. Patient has had no behavioral issues to note. Patient has been med compliant. Patient states they have no further needs at this time. He reports his mood is good, and denies anhedonia.

## 2024-12-01 NOTE — PROGRESS NOTES
"    Psychiatric Progress Note - Department of Behavioral Health   Franco Roberson 39 y.o. male MRN: [id number]  Unit/Bed#: [facility] 347-02 Encounter: [id number]    ASSESSMENT & PLAN     Assessment & Plan  MDD (major depressive disorder), recurrent severe, without psychosis (HCC)  No medications changes at this time, optimize as indicated;  Patient continues to be pending group home placement. No acute concerns today. Pt denied SSI and SSD, CM cont to work on personal care home placement and assisting pt w/ finances. Worked w/ SW to place money in [employer] account. Pending SSC meeting next month.    Abilify 5 mg daily as mood adjunct   Zoloft 150 mg daily for depression and anxiety  Continue to encourage participation in group therapy, milieu therapy and occupational therapy.  Continue to assess for side effects of medications.  Continue collaboration with PATRICIA for medical co-morbidities as indicated.  Continue discussion with CM/SW to assist with obtaining collateral, disposition planning, and the implementation of patient-centered individualized plan of care.  Continue frequent safety checks and vitals per unit protocol.    Risks, benefits and possible side effects of Medications: Risks, benefits, and possible side effects of medications have previously been explained. No new medications at this time.      Legal status: 201    Disposition: to be determined, pending SOAR application for potential group home placement. OT Cognitive Evaluation completed: \"Pt would benefit from discharge to a supportive environment that can provide checks for safety and compliance with IADLs. \"   Although patient's mood has stabilized, they are currently awaiting group home placement.  Their ability to recognize safety hazards take medications and participate in IADLs are significantly impaired by their cognitive deficits compounded by their chronic mental health needs and would be at risk for significant decompensation without the structure and " support of a group home setting.      No associated orders from this encounter found during lookback period of 72 hours.  Autism spectrum disorder  Continue supportive care    No associated orders from this encounter found during lookback period of 72 hours.      Treatment Recommendations/Precautions:  Continue to promote patient participation in therapeutic milieu.  Continue medical management per medicine.  Continue previously prescribed psychotropic medication regimen; see below.  Continue behavioral health checks q.15 minutes.   Continue vitals per behavioral health unit protocol.  Discharge date per primary team; 201 commitment status.    SUBJECTIVE     Patient evaluated this a.m. for continuity of care. Patient was discussed at length with nursing and treatment team. Per nursing, patient remains calm, cooperative, intermittently interactive in the milieu, adherent to his medications without any acute adverse effects. No acute distress is noted throughout evaluation. Franco Roberson denies suicidal/homicidal ideation in addition to thoughts of self-injury, receptive to crisis planning provided by this writer, contacting for safety in the inpatient setting, admitting to an ability to appropriately confide in staff including this writer. Patient remains superficial, scant, sparse, denying any/all psychiatric complaints/concerns.     PSYCHIATRIC REVIEW OF SYSTEMS     Behavior over the last 24 hours:  unchanged  Sleep: OK  Appetite: OK  Medication side effects: none    REVIEW OF SYSTEMS   Review of systems: no complaints    OBJECTIVE     Vital Signs in Past 24 Hours:  Temp:  [96.7 °F (35.9 °C)-97.1 °F (36.2 °C)] 97.1 °F (36.2 °C)  HR:  [65-74] 65  BP: ()/(64-75) 116/75  Resp:  [16] 16  SpO2:  [98 %] 98 %  O2 Device: None (Room air)    Intake/Output in Past 24 hours:  No intake/output data recorded.  No intake/output data recorded.        Laboratory Results:  I have personally reviewed all pertinent  laboratory/tests results.  Most Recent Labs:   Lab Results   Component Value Date    WBC 6.75 11/12/2024    RBC 5.06 11/12/2024    HGB 15.9 11/12/2024    HCT 46.2 11/12/2024     11/12/2024    RDW 12.8 11/12/2024    NEUTROABS 4.15 11/12/2024    SODIUM 139 11/12/2024    K 4.0 11/12/2024     11/12/2024    CO2 30 11/12/2024    BUN 17 11/12/2024    CREATININE 0.99 11/12/2024    GLUC 85 11/12/2024    GLUF 85 11/12/2024    CALCIUM 9.4 11/12/2024    AST 23 11/12/2024    ALT 32 11/12/2024    ALKPHOS 73 11/12/2024    TP 8.1 11/12/2024    ALB 4.4 11/12/2024    TBILI 0.54 11/12/2024    CHOLESTEROL 151 11/26/2024    HDL 39 (L) 11/26/2024    TRIG 170 (H) 11/26/2024    LDLCALC 78 11/26/2024    NONHDLC 112 11/26/2024    [id number] 2.532 11/12/2024    FREET4 0.56 (L) 09/10/2024       Behavioral Health Medications: all current active meds have been reviewed and current meds:   Current Facility-Administered Medications:     acetaminophen (TYLENOL) tablet 650 mg, Q6H PRN    acetaminophen (TYLENOL) tablet 650 mg, Q4H PRN    acetaminophen (TYLENOL) tablet 975 mg, Q6H PRN    aluminum-magnesium hydroxide-simethicone (MAALOX) oral suspension 30 mL, Q4H PRN    ARIPiprazole (ABILIFY) tablet 5 mg, Daily    Artificial Tears ophthalmic solution 1 drop, Q3H PRN    atorvastatin (LIPITOR) tablet 10 mg, Daily With Dinner    benztropine (COGENTIN) injection 1 mg, Q4H PRN Max 6/day    benztropine (COGENTIN) tablet 1 mg, Q4H PRN Max 6/day    Cholecalciferol (VITAMIN D3) tablet 2,500 Units, Daily    cyanocobalamin (VITAMIN B-12) tablet 1,000 mcg, Daily    Diclofenac Sodium (VOLTAREN) 1 % topical gel 2 g, 4x Daily PRN    hydrOXYzine HCL (ATARAX) tablet 50 mg, Q6H PRN Max 4/day **OR** diphenhydrAMINE (BENADRYL) injection 50 mg, Q6H PRN    diphenhydrAMINE-zinc acetate (BENADRYL) 2-0.1 % cream, Daily PRN    famotidine (PEPCID) tablet 20 mg, BID    hydrOXYzine HCL (ATARAX) tablet 100 mg, Q6H PRN Max 4/day **OR** LORazepam (ATIVAN) injection 2  mg, Q6H PRN    hydrOXYzine HCL (ATARAX) tablet 25 mg, Q6H PRN Max 4/day    levothyroxine tablet 37.5 mcg, Early Morning    melatonin tablet 6 mg, HS PRN    methocarbamol (ROBAXIN) tablet 500 mg, Q6H PRN    OLANZapine (ZyPREXA) tablet 5 mg, Q4H PRN Max 3/day **OR** OLANZapine (ZyPREXA) IM injection 2.5 mg, Q4H PRN Max 3/day    OLANZapine (ZyPREXA) tablet 5 mg, Q3H PRN Max 3/day **OR** OLANZapine (ZyPREXA) IM injection 5 mg, Q3H PRN Max 3/day    OLANZapine (ZyPREXA) tablet 2.5 mg, Q4H PRN Max 6/day    polyethylene glycol (MIRALAX) packet 17 g, Daily PRN    propranolol (INDERAL) tablet 10 mg, Q8H PRN    senna-docusate sodium (SENOKOT S) 8.6-50 mg per tablet 2 tablet, After Dinner    sertraline (ZOLOFT) tablet 150 mg, Daily    traZODone (DESYREL) tablet 50 mg, HS PRN.    Risks, benefits and possible side effects of Medications:   Risks, benefits, and possible side effects of medications explained to patient and patient verbalizes understanding.      Mental Status Evaluation:  Appearance:  age appropriate, casually dressed, and disheveled   Behavior:  psychomotor retardation   Speech:  normal pitch and normal volume   Mood:  euthymic   Affect:  blunted   Language sparse   Thought Process:  concrete   Thought Content:  Appears parnaoid    Perceptual Disturbances: None   Risk Potential: Suicidal Ideations none, Homicidal Ideations none, and Potential for Aggression No   Sensorium:  person, place, and time/date   Cognition:  recent and remote memory grossly intact   Consciousness:  alert and awake    Attention: attention span appeared shorter than expected for age   Insight:  limited   Judgment: limited   Intellect Not assessed   Gait/Station: normal gait/station and normal balance   Motor Activity: no abnormal movements     Memory: Short and long term memory  fair     Progress Toward Goals: unchanged, as evidenced by their participation (or lack thereof) in individual, social and therapeutic milieu in addition to adherence  to their medication regimen.    Recommended Treatment:   See above for assessment and plan.    Inpatient Psychiatric Certification: Based upon physical, mental and social evaluations, I certify that inpatient psychiatric services are medically necessary for this patient for a duration of greater than 2 midnights for the treatment of MDD (major depressive disorder), recurrent severe, without psychosis (HCC) including psychotropic medication management, participation in the therapeutic milieu and referrals as indicated. Available alternative community resources do not meet the patient's mental health care needs. I further attest that an established written individualized plan of care has been implemented and is outlined in the patient's medical records.    Counseling/Coordination of Care    I have expended greater than 15 minutes in which more than 50% of this time was expended in counseling/coordination of patient care relating to diagnostic results, prognosis, potential risks and benefits of management options, instructions for appropriate management, patient and/or collateral education, importance of adherence to management and/or risk factor reductions. Patient's rights, confidentiality, exceptions to confidentiality, use of electronic medical record including appropriate staff access to medical record regarding behavioral health services and consent to treatment were reviewed.    Note Share:     This note was not shared with the patient due to reasonable likelihood of causing patient harm     This note has been constructed using a voice recognition system. There may be translation, syntax,  or grammatical errors. If you have any questions, please contact the dictating provider.    Jordan Christopher Holter, DO 12/01/24

## 2024-12-01 NOTE — PLAN OF CARE
Problem: Depression  Goal: Treatment Goal: Demonstrate behavioral control of depressive symptoms, verbalize feelings of improved mood/affect, and adopt new coping skills prior to discharge  Outcome: Progressing  Goal: Verbalize thoughts and feelings  Description: Interventions:  - Assess and re-assess patient's level of risk   - Engage patient in 1:1 interactions, daily, for a minimum of 15 minutes   - Encourage patient to express feelings, fears, frustrations, hopes   Outcome: Progressing  Goal: Refrain from harming self  Description: Interventions:  - Monitor patient closely, per order   - Supervise medication ingestion, monitor effects and side effects   Outcome: Progressing  Goal: Refrain from isolation  Description: Interventions:  - Develop a trusting relationship   - Encourage socialization   Outcome: Progressing     Problem: Anxiety  Goal: Anxiety is at manageable level  Description: Interventions:  - Assess and monitor patient's anxiety level.   - Monitor for signs and symptoms (heart palpitations, chest pain, shortness of breath, headaches, nausea, feeling jumpy, restlessness, irritable, apprehensive).   - Collaborate with interdisciplinary team and initiate plan and interventions as ordered.  - [place] patient to unit/surroundings  - Explain treatment plan  - Encourage participation in care  - Encourage verbalization of concerns/fears  - Identify coping mechanisms  - Assist in developing anxiety-reducing skills  - Administer/offer alternative therapies  - Limit or eliminate stimulants  Outcome: Progressing     Problem: DISCHARGE PLANNING - CARE MANAGEMENT  Goal: Discharge to post-acute care or home with appropriate resources  Description: INTERVENTIONS:  - Conduct assessment to determine patient/family and health care team treatment goals, and need for post-acute services based on payer coverage, community resources, and patient preferences, and barriers to discharge  - Address psychosocial, clinical,  and financial barriers to discharge as identified in assessment in conjunction with the patient/family and health care team  - Arrange appropriate level of post-acute services according to patient’s   needs and preference and payer coverage in collaboration with the physician and health care team  - Communicate with and update the patient/family, physician, and health care team regarding progress on the discharge plan  - Arrange appropriate transportation to post-acute venues  Outcome: Progressing     Problem: Knowledge Deficit  Goal: Patient/family/caregiver demonstrates understanding of disease process, treatment plan, medications, and discharge instructions  Description: Complete learning assessment and assess knowledge base.  Interventions:  - Provide teaching at level of understanding  - Provide teaching via preferred learning methods  Outcome: Progressing     Problem: SLEEP DISTURBANCE  Goal: Will exhibit normal sleeping pattern  Description: Interventions:  -  Assess the patients sleep pattern, noting recent changes  - Administer medication as ordered  - Decrease environmental stimuli, including noise, as appropriate during the night  - Encourage the patient to actively participate in unit groups and or exercise during the day to enhance ability to achieve adequate sleep at night  - Assess the patient, in the morning, encouraging a description of sleep experience  Outcome: Progressing     Problem: Ineffective Coping  Goal: Participates in unit activities  Description: Interventions:  - Provide therapeutic environment   - Provide required programming   - Redirect inappropriate behaviors   Outcome: Not Progressing

## 2024-12-02 PROCEDURE — 99232 SBSQ HOSP IP/OBS MODERATE 35: CPT | Performed by: PSYCHIATRY & NEUROLOGY

## 2024-12-02 RX ADMIN — LEVOTHYROXINE SODIUM 37.5 MCG: 125 TABLET ORAL at 06:34

## 2024-12-02 RX ADMIN — SENNOSIDES AND DOCUSATE SODIUM 2 TABLET: 50; 8.6 TABLET ORAL at 17:19

## 2024-12-02 RX ADMIN — FAMOTIDINE 20 MG: 20 TABLET ORAL at 17:20

## 2024-12-02 RX ADMIN — Medication 2500 UNITS: at 08:31

## 2024-12-02 RX ADMIN — SERTRALINE HYDROCHLORIDE 150 MG: 100 TABLET ORAL at 08:31

## 2024-12-02 RX ADMIN — FAMOTIDINE 20 MG: 20 TABLET ORAL at 08:31

## 2024-12-02 RX ADMIN — CYANOCOBALAMIN TAB 1000 MCG 1000 MCG: 1000 TAB at 08:31

## 2024-12-02 RX ADMIN — ARIPIPRAZOLE 5 MG: 5 TABLET ORAL at 08:31

## 2024-12-02 RX ADMIN — ATORVASTATIN CALCIUM 10 MG: 10 TABLET, FILM COATED ORAL at 17:19

## 2024-12-02 NOTE — PROGRESS NOTES
Pt and this writer met for 1:1 engagement when PM group was completed. Pt and this writer discussed his hopefulness for an eventual group home placement. Pt is brighter in this engagement compared to previous interactions. Pt is hoping to call a friend this afternoon with the updated timeline.

## 2024-12-02 NOTE — ASSESSMENT & PLAN NOTE
"No medications changes at this time, optimize as indicated;  Patient continues to be pending group home placement. No acute concerns today. Pt denied SSI and SSD, CM cont to work on personal care home placement and assisting pt w/ finances. Worked w/ SW to place money in [employer] account. Pending SSC meeting this month.    Abilify 5 mg daily as mood adjunct   Zoloft 150 mg daily for depression and anxiety  Continue to encourage participation in group therapy, milieu therapy and occupational therapy.  Continue to assess for side effects of medications.  Continue collaboration with PATRICIA for medical co-morbidities as indicated.  Continue discussion with CM/SW to assist with obtaining collateral, disposition planning, and the implementation of patient-centered individualized plan of care.  Continue frequent safety checks and vitals per unit protocol.    Risks, benefits and possible side effects of Medications: Risks, benefits, and possible side effects of medications have previously been explained. No new medications at this time.      Legal status: 201    Disposition: to be determined, pending SOAR application for potential group home placement. OT Cognitive Evaluation completed: \"Pt would benefit from discharge to a supportive environment that can provide checks for safety and compliance with IADLs. \"   Although patient's mood has stabilized, they are currently awaiting group home placement.  Their ability to recognize safety hazards take medications and participate in IADLs are significantly impaired by their cognitive deficits compounded by their chronic mental health needs and would be at risk for significant decompensation without the structure and support of a group home setting.      No associated orders from this encounter found during lookback period of 72 hours.  "

## 2024-12-02 NOTE — PROGRESS NOTES
12/02/24 0842   Team Meeting   Meeting Type Daily Rounds   Team Members Present   Team Members Present Physician;Nurse;   Physician Team Member Ary   [facility]   Patient/Family Present   Patient Present No   Patient's Family Present No     Pt med/meal compliant. Visible on the unit. Pleasant, calm, cooperative. Social with staff and peers. Discharge pending placement.

## 2024-12-02 NOTE — NURSING NOTE
Pt denies SI/HI/AH/VH. Present in dayroom and milieu. Medication and meal compliant. Social with select peers. Pt observed in group. Compliant with Lunch. Scant/guarded with communication. No further concerns as of present. Plan of care ongoing.

## 2024-12-02 NOTE — PROGRESS NOTES
"Progress Note - Behavioral Health   Name: Franco Roberson 39 y.o. male I MRN: [id number]  Unit/Bed#: -02 I Date of Admission: 5/14/2024   Date of Service: 12/2/2024 I Hospital Day: 202     Assessment & Plan  MDD (major depressive disorder), recurrent severe, without psychosis (HCC)  No medications changes at this time, optimize as indicated;  Patient continues to be pending group home placement. No acute concerns today. Pt denied SSI and SSD, CM cont to work on personal care home placement and assisting pt w/ finances. Worked w/ SW to place money in [employer] account. Pending SSC meeting this month.    Abilify 5 mg daily as mood adjunct   Zoloft 150 mg daily for depression and anxiety  Continue to encourage participation in group therapy, milieu therapy and occupational therapy.  Continue to assess for side effects of medications.  Continue collaboration with SLIM for medical co-morbidities as indicated.  Continue discussion with CM/SW to assist with obtaining collateral, disposition planning, and the implementation of patient-centered individualized plan of care.  Continue frequent safety checks and vitals per unit protocol.    Risks, benefits and possible side effects of Medications: Risks, benefits, and possible side effects of medications have previously been explained. No new medications at this time.      Legal status: 201    Disposition: to be determined, pending SOAR application for potential group home placement. OT Cognitive Evaluation completed: \"Pt would benefit from discharge to a supportive environment that can provide checks for safety and compliance with IADLs. \"   Although patient's mood has stabilized, they are currently awaiting group home placement.  Their ability to recognize safety hazards take medications and participate in IADLs are significantly impaired by their cognitive deficits compounded by their chronic mental health needs and would be at risk for significant decompensation without the " structure and support of a group home setting.      No associated orders from this encounter found during lookback period of 72 hours.  Autism spectrum disorder  Continue supportive care    No associated orders from this encounter found during lookback period of 72 hours.        Plan     Recommended Treatment:    - Encourage early mobility and having a structured day  - Provide frequent re-orientation, and cognitive stimulation  - Ensure assistive devices are in proper working order (eye-glasses, hearing aids)  - Encourage adequate hydration, nutrition and monitor bowel movements  - Maintain sleep-wake cycle: Uninterrupted sleep time; low-level lighting at night  - Fall precaution  - f/u SLIM recs regarding the medical problems   - Continue medication titration and treatment plan; adjust medication to optimize treatment response and as clinically indicated. .   - Observation: routine            - VS: as per unit protocol  - Diet: Regular diet  - Encourage group attendance and milieu therapy  - Dispo: To be determined     Scheduled medications:  Current Facility-Administered Medications   Medication Dose Route Frequency Provider Last Rate    acetaminophen  650 mg Oral Q6H PRN Basilio Brown MD      acetaminophen  650 mg Oral Q4H PRN Basilio Brown MD      acetaminophen  975 mg Oral Q6H PRN Basilio Brown MD      aluminum-magnesium hydroxide-simethicone  30 mL Oral Q4H PRN Basilio Brown MD      ARIPiprazole  5 mg Oral Daily Basilio Panda MD      Artificial Tears  1 drop Both Eyes Q3H PRN Basilio Brown MD      atorvastatin  10 mg Oral Daily With Dinner WALLY Baptiste      benztropine  1 mg Intramuscular Q4H PRN Max 6/day Basilio rBown MD      benztropine  1 mg Oral Q4H PRN Max 6/day Basilio Brown MD      Cholecalciferol  2,500 Units Oral Daily Dustin Mcallister MD      cyanocobalamin  1,000 mcg Oral Daily WALLY Baptiste      Diclofenac Sodium  2 g  Topical 4x Daily PRN Laura Ashford, WALLY      hydrOXYzine HCL  50 mg Oral Q6H PRN Max 4/day Basilio Brown MD      Or    diphenhydrAMINE  50 mg Intramuscular Q6H PRN Basilio Brown MD      diphenhydrAMINE-zinc acetate   Topical Daily PRN Raj Guerrero MD      famotidine  20 mg Oral BID Laura Amber Ashford, WALLY      hydrOXYzine HCL  100 mg Oral Q6H PRN Max 4/day Basilio Brown MD      Or    LORazepam  2 mg Intramuscular Q6H PRN Basilio Brown MD      hydrOXYzine HCL  25 mg Oral Q6H PRN Max 4/day Basilio Brown MD      levothyroxine  37.5 mcg Oral Early Morning Laura Ashford, ARJUNNP      melatonin  6 mg Oral HS PRN WALLY Gomez      methocarbamol  500 mg Oral Q6H PRN WALLY Baptiste      OLANZapine  5 mg Oral Q4H PRN Max 3/day Basilio Brown MD      Or    OLANZapine  2.5 mg Intramuscular Q4H PRN Max 3/day Basilio Brown MD      OLANZapine  5 mg Oral Q3H PRN Max 3/day Basilio Brown MD      Or    OLANZapine  5 mg Intramuscular Q3H PRN Max 3/day Basilio Brown MD      OLANZapine  2.5 mg Oral Q4H PRN Max 6/day Basilio Brown MD      polyethylene glycol  17 g Oral Daily PRN Basilio Brown MD      propranolol  10 mg Oral Q8H PRN Basiloi Brown MD      senna-docusate sodium  2 tablet Oral After Dinner Dustin Mcallister MD      sertraline  150 mg Oral Daily WALLY Gomez      traZODone  50 mg Oral HS PRN Basilio Brown MD        PRN:    acetaminophen    acetaminophen    acetaminophen    aluminum-magnesium hydroxide-simethicone    Artificial Tears    benztropine    benztropine    Diclofenac Sodium    hydrOXYzine HCL **OR** diphenhydrAMINE    diphenhydrAMINE-zinc acetate    hydrOXYzine HCL **OR** LORazepam    hydrOXYzine HCL    melatonin    methocarbamol    OLANZapine **OR** OLANZapine    OLANZapine **OR** OLANZapine    OLANZapine    polyethylene glycol    propranolol    traZODone       Subjective    "  Patient was visited on unit for continuing care; chart reviewed and discussed with multidisciplinary treatment team.  On approach, the patient was calm and cooperative. Denied any changes in mood, appetite, and energy level.  No acute physical complaints or concerns.  Continues to report okay mood.  No problem initiating and maintaining sleep.  Denied A/VH currently.  Denied SI/HI, intent or plan upon direct inquiry at this time.    Patient continues to be visible in the milieu and interacts with staff and peers. No reports of aggression or self-injurious behavior on unit. No PRN medications used in the past 24 hours.    Patient accepted all offered medications and no adverse effects of medications noted or reported.    Objective    Current Mental Status Evaluation:  Appearance: casually dressed, consistent with stated age  Motor: no psychomotor retardation, no gait abnormalities  Behavior: cooperative, answers questions appropriately  Speech: soft, normal rhythm  Mood: \"ok\"  Affect: blunted  Thought Process: linear and goal-oriented  Thought Content: denies delusions or paranoia  Risk Potential: denies suicidal ideation, plan, or intent. Denies homicidal ideation  Perceptions: denies auditory hallucinations, denies visual hallucinations  Sensorium: Oriented to person, place, time, and situation  Cognition: cognitive ability appears intact but was not quantitatively tested  Consciousness: alert and awake  Attention: intact, able to focus without difficulty  Insight: limited  Judgement: limited            Vital signs in last 24 hours:    Temp:  [96.6 °F (35.9 °C)-98.6 °F (37 °C)] 98.6 °F (37 °C)  HR:  [75-86] 86  BP: (118-125)/(71-87) 125/87  Resp:  [16] 16  SpO2:  [98 %-99 %] 99 %  O2 Device: None (Room air)    Psychiatric Review of Systems:  Medication adverse effects: none  Sleep: unchanged  Appetite: unchanged  Behaviors over the past 24 hours: unchanged    Laboratory results:    I have personally reviewed all " pertinent laboratory/tests results  No results found for this or any previous visit (from the past 48 hours).       Progress Toward Goals & Illness Status:   progressing, attends groups, participates in milieu therapy, discharge planning, placement pending    Patient is not at goal. They are not yet ready for discharge. The patient's condition currently requires active psychopharmacological medication management, interdisciplinary coordination with case management, and the utilization of adjunctive milieu and group therapy to augment psychopharmacological efficacy. The patient's risk of morbidity, and progression or decompensation of psychiatric disease, is higher without this current treatment.     Next of Kin  Extended Emergency Contact Information  Primary Emergency Contact: Lois Roberson  Address: [address]           [place] [id number] [place] [address] [place]  Home Phone: [phone]  Relation: Mother    Counseling / Coordination of Care  Patient's progress discussed with staff in treatment team meeting.  Medications, treatment progress and treatment plan reviewed with patient.  Medication education provided to patient.  Educated on importance of medication and treatment compliance.  Supportive therapy provided to patient.  Cognitive techniques utilized during the session.  Reassurance and supportive therapy provided.  Reoriented to reality and reassured.  Encouraged participation in milieu and group therapy on the unit.  Crisis/safety plan discussed with patient.       Dustin Mcallister MD  Attending Psychiatrist   [facility]      This note has been constructed using a voice recognition system. There may be translation, syntax, or grammatical errors. If you have any questions, please contact the dictating provider.

## 2024-12-02 NOTE — NURSING NOTE
Patient isolative to room most of shift, but paced hallways after 2100. Patient conversation is appropriate and denies any psych symptoms at this time. Q15 observation maintained.

## 2024-12-02 NOTE — PLAN OF CARE
Problem: Ineffective Coping  Goal: Participates in unit activities  Description: Interventions:  - Provide therapeutic environment   - Provide required programming   - Redirect inappropriate behaviors   Outcome: Progressing     Problem: Depression  Goal: Treatment Goal: Demonstrate behavioral control of depressive symptoms, verbalize feelings of improved mood/affect, and adopt new coping skills prior to discharge  Outcome: Progressing  Goal: Verbalize thoughts and feelings  Description: Interventions:  - Assess and re-assess patient's level of risk   - Engage patient in 1:1 interactions, daily, for a minimum of 15 minutes   - Encourage patient to express feelings, fears, frustrations, hopes   Outcome: Progressing  Goal: Refrain from harming self  Description: Interventions:  - Monitor patient closely, per order   - Supervise medication ingestion, monitor effects and side effects   Outcome: Progressing  Goal: Refrain from isolation  Description: Interventions:  - Develop a trusting relationship   - Encourage socialization   Outcome: Progressing     Problem: Anxiety  Goal: Anxiety is at manageable level  Description: Interventions:  - Assess and monitor patient's anxiety level.   - Monitor for signs and symptoms (heart palpitations, chest pain, shortness of breath, headaches, nausea, feeling jumpy, restlessness, irritable, apprehensive).   - Collaborate with interdisciplinary team and initiate plan and interventions as ordered.  - [place] patient to unit/surroundings  - Explain treatment plan  - Encourage participation in care  - Encourage verbalization of concerns/fears  - Identify coping mechanisms  - Assist in developing anxiety-reducing skills  - Administer/offer alternative therapies  - Limit or eliminate stimulants  Outcome: Progressing     Problem: DISCHARGE PLANNING - CARE MANAGEMENT  Goal: Discharge to post-acute care or home with appropriate resources  Description: INTERVENTIONS:  - Conduct assessment to  determine patient/family and health care team treatment goals, and need for post-acute services based on payer coverage, community resources, and patient preferences, and barriers to discharge  - Address psychosocial, clinical, and financial barriers to discharge as identified in assessment in conjunction with the patient/family and health care team  - Arrange appropriate level of post-acute services according to patient’s   needs and preference and payer coverage in collaboration with the physician and health care team  - Communicate with and update the patient/family, physician, and health care team regarding progress on the discharge plan  - Arrange appropriate transportation to post-acute venues  Outcome: Progressing     Problem: Knowledge Deficit  Goal: Patient/family/caregiver demonstrates understanding of disease process, treatment plan, medications, and discharge instructions  Description: Complete learning assessment and assess knowledge base.  Interventions:  - Provide teaching at level of understanding  - Provide teaching via preferred learning methods  Outcome: Progressing     Problem: SLEEP DISTURBANCE  Goal: Will exhibit normal sleeping pattern  Description: Interventions:  -  Assess the patients sleep pattern, noting recent changes  - Administer medication as ordered  - Decrease environmental stimuli, including noise, as appropriate during the night  - Encourage the patient to actively participate in unit groups and or exercise during the day to enhance ability to achieve adequate sleep at night  - Assess the patient, in the morning, encouraging a description of sleep experience  Outcome: Progressing

## 2024-12-03 PROCEDURE — 99232 SBSQ HOSP IP/OBS MODERATE 35: CPT | Performed by: PSYCHIATRY & NEUROLOGY

## 2024-12-03 RX ADMIN — SENNOSIDES AND DOCUSATE SODIUM 2 TABLET: 50; 8.6 TABLET ORAL at 17:26

## 2024-12-03 RX ADMIN — FAMOTIDINE 20 MG: 20 TABLET ORAL at 08:37

## 2024-12-03 RX ADMIN — ARIPIPRAZOLE 5 MG: 5 TABLET ORAL at 08:37

## 2024-12-03 RX ADMIN — LEVOTHYROXINE SODIUM 37.5 MCG: 125 TABLET ORAL at 06:20

## 2024-12-03 RX ADMIN — SERTRALINE HYDROCHLORIDE 150 MG: 100 TABLET ORAL at 08:37

## 2024-12-03 RX ADMIN — ATORVASTATIN CALCIUM 10 MG: 10 TABLET, FILM COATED ORAL at 17:26

## 2024-12-03 RX ADMIN — Medication 2500 UNITS: at 08:37

## 2024-12-03 RX ADMIN — CYANOCOBALAMIN TAB 1000 MCG 1000 MCG: 1000 TAB at 08:37

## 2024-12-03 RX ADMIN — FAMOTIDINE 20 MG: 20 TABLET ORAL at 17:26

## 2024-12-03 NOTE — ASSESSMENT & PLAN NOTE
"No medications changes at this time, optimize as indicated;  Patient continues to be pending group home placement. No acute concerns today. Pt denied SSI and SSD, CM cont to work on personal care home placement and assisting pt w/ finances. Worked w/ SW to place money in [employer] account. Pending SSC meeting this month    Abilify 5 mg daily as mood adjunct   Zoloft 150 mg daily for depression and anxiety  Continue to encourage participation in group therapy, milieu therapy and occupational therapy.  Continue to assess for side effects of medications.  Continue collaboration with PATRICIA for medical co-morbidities as indicated.  Continue discussion with CM/SW to assist with obtaining collateral, disposition planning, and the implementation of patient-centered individualized plan of care.  Continue frequent safety checks and vitals per unit protocol.    Risks, benefits and possible side effects of Medications: Risks, benefits, and possible side effects of medications have previously been explained. No new medications at this time.      Legal status: 201    Disposition: to be determined, pending SOAR application for potential group home placement. OT Cognitive Evaluation completed: \"Pt would benefit from discharge to a supportive environment that can provide checks for safety and compliance with IADLs. \"   Although patient's mood has stabilized, they are currently awaiting group home placement.  Their ability to recognize safety hazards take medications and participate in IADLs are significantly impaired by their cognitive deficits compounded by their chronic mental health needs and would be at risk for significant decompensation without the structure and support of a group home setting.      No associated orders from this encounter found during lookback period of 72 hours.  "

## 2024-12-03 NOTE — NURSING NOTE
Pt isolative to self and room this evening, pleasant and cooperative upon approach. Pt denies current SI/HI/AH/VH and unmet needs.

## 2024-12-03 NOTE — PROGRESS NOTES
"Progress Note - Behavioral Health   Name: Franco Roberson 39 y.o. male I MRN: [id number]  Unit/Bed#: -02 I Date of Admission: 5/14/2024   Date of Service: 12/3/2024 I Hospital Day: 203     Assessment & Plan  MDD (major depressive disorder), recurrent severe, without psychosis (HCC)  No medications changes at this time, optimize as indicated;  Patient continues to be pending group home placement. No acute concerns today. Pt denied SSI and SSD, CM cont to work on personal care home placement and assisting pt w/ finances. Worked w/ SW to place money in [employer] account. Pending SSC meeting this month    Abilify 5 mg daily as mood adjunct   Zoloft 150 mg daily for depression and anxiety  Continue to encourage participation in group therapy, milieu therapy and occupational therapy.  Continue to assess for side effects of medications.  Continue collaboration with SLIM for medical co-morbidities as indicated.  Continue discussion with CM/SW to assist with obtaining collateral, disposition planning, and the implementation of patient-centered individualized plan of care.  Continue frequent safety checks and vitals per unit protocol.    Risks, benefits and possible side effects of Medications: Risks, benefits, and possible side effects of medications have previously been explained. No new medications at this time.      Legal status: 201    Disposition: to be determined, pending SOAR application for potential group home placement. OT Cognitive Evaluation completed: \"Pt would benefit from discharge to a supportive environment that can provide checks for safety and compliance with IADLs. \"   Although patient's mood has stabilized, they are currently awaiting group home placement.  Their ability to recognize safety hazards take medications and participate in IADLs are significantly impaired by their cognitive deficits compounded by their chronic mental health needs and would be at risk for significant decompensation without the " structure and support of a group home setting.      No associated orders from this encounter found during lookback period of 72 hours.  Autism spectrum disorder  Continue supportive care    No associated orders from this encounter found during lookback period of 72 hours.        Plan     Recommended Treatment:    - Encourage early mobility and having a structured day  - Provide frequent re-orientation, and cognitive stimulation  - Ensure assistive devices are in proper working order (eye-glasses, hearing aids)  - Encourage adequate hydration, nutrition and monitor bowel movements  - Maintain sleep-wake cycle: Uninterrupted sleep time; low-level lighting at night  - Fall precaution  - f/u SLIM recs regarding the medical problems   - Continue medication titration and treatment plan; adjust medication to optimize treatment response and as clinically indicated. .   - Observation: routine            - VS: as per unit protocol  - Diet: Regular diet  - Encourage group attendance and milieu therapy  - Dispo: To be determined     Scheduled medications:  Current Facility-Administered Medications   Medication Dose Route Frequency Provider Last Rate    acetaminophen  650 mg Oral Q6H PRN Basilio Brown MD      acetaminophen  650 mg Oral Q4H PRN Basilio Brown MD      acetaminophen  975 mg Oral Q6H PRN Basilio Brown MD      aluminum-magnesium hydroxide-simethicone  30 mL Oral Q4H PRN Basilio Brown MD      ARIPiprazole  5 mg Oral Daily Basilio Panda MD      Artificial Tears  1 drop Both Eyes Q3H PRN Basilio Brown MD      atorvastatin  10 mg Oral Daily With Dinner WALLY Baptiste      benztropine  1 mg Intramuscular Q4H PRN Max 6/day Basilio Brown MD      benztropine  1 mg Oral Q4H PRN Max 6/day Basilio Brown MD      Cholecalciferol  2,500 Units Oral Daily Dustin Mcallister MD      cyanocobalamin  1,000 mcg Oral Daily WALLY Baptiste      Diclofenac Sodium  2 g  Topical 4x Daily PRN Laura Ashford, WALLY      hydrOXYzine HCL  50 mg Oral Q6H PRN Max 4/day Basilio Brown MD      Or    diphenhydrAMINE  50 mg Intramuscular Q6H PRN Basilio Brown MD      diphenhydrAMINE-zinc acetate   Topical Daily PRN Raj Guerrero MD      famotidine  20 mg Oral BID Laura Amber Ashford, WALLY      hydrOXYzine HCL  100 mg Oral Q6H PRN Max 4/day Basilio Brown MD      Or    LORazepam  2 mg Intramuscular Q6H PRN Basilio Brown MD      hydrOXYzine HCL  25 mg Oral Q6H PRN Max 4/day Basilio Brown MD      levothyroxine  37.5 mcg Oral Early Morning Laura Ashford, ARJUNNP      melatonin  6 mg Oral HS PRN WALLY Gomez      methocarbamol  500 mg Oral Q6H PRN WALLY Baptiste      OLANZapine  5 mg Oral Q4H PRN Max 3/day Basilio Brown MD      Or    OLANZapine  2.5 mg Intramuscular Q4H PRN Max 3/day Basilio Brown MD      OLANZapine  5 mg Oral Q3H PRN Max 3/day Basilio Brown MD      Or    OLANZapine  5 mg Intramuscular Q3H PRN Max 3/day Basilio Brown MD      OLANZapine  2.5 mg Oral Q4H PRN Max 6/day Basilio Brown MD      polyethylene glycol  17 g Oral Daily PRN Basilio Brwon MD      propranolol  10 mg Oral Q8H PRN Basilio Brown MD      senna-docusate sodium  2 tablet Oral After Dinner Dustin Mcallister MD      sertraline  150 mg Oral Daily WALLY Gomez      traZODone  50 mg Oral HS PRN Basilio Brown MD        PRN:    acetaminophen    acetaminophen    acetaminophen    aluminum-magnesium hydroxide-simethicone    Artificial Tears    benztropine    benztropine    Diclofenac Sodium    hydrOXYzine HCL **OR** diphenhydrAMINE    diphenhydrAMINE-zinc acetate    hydrOXYzine HCL **OR** LORazepam    hydrOXYzine HCL    melatonin    methocarbamol    OLANZapine **OR** OLANZapine    OLANZapine **OR** OLANZapine    OLANZapine    polyethylene glycol    propranolol    traZODone       Subjective    "  Patient was visited on unit for continuing care; chart reviewed and discussed with multidisciplinary treatment team.  On approach, the patient was calm and cooperative. Denied any changes in mood, appetite, and energy level. No acute physical complaints or concerns at this time.  No problem initiating and maintaining sleep.  Denied A/VH currently.  Denied SI/HI, intent or plan upon direct inquiry at this time.    Patient continues to be visible in the milieu and interacts with staff and peers. No reports of aggression or self-injurious behavior on unit. No PRN medications used in the past 24 hours.    Patient accepted all offered medications and no adverse effects of medications noted or reported.    Objective    Current Mental Status Evaluation:  Appearance: casually dressed, consistent with stated age  Motor: no psychomotor retardation, no gait abnormalities  Behavior: cooperative, answers questions appropriately  Speech: soft, normal rhythm  Mood: \"good\"  Affect: blunted  Thought Process: linear and goal-oriented  Thought Content: denies delusions  Risk Potential: denies suicidal ideation, plan, or intent. Denies homicidal ideation  Perceptions: denies auditory hallucinations, denies visual hallucinations,   Sensorium: Oriented to person, place, time, and situation  Cognition: cognitive ability appears intact but was not quantitatively tested  Consciousness: alert and awake  Attention: intact, able to focus without difficulty  Insight: limited  Judgement: limited            Vital signs in last 24 hours:    Temp:  [97.6 °F (36.4 °C)] 97.6 °F (36.4 °C)  HR:  [72-73] 73  BP: (115-134)/(56-82) 115/56  Resp:  [16] 16  SpO2:  [97 %-98 %] 97 %  O2 Device: None (Room air)    Psychiatric Review of Systems:  Medication adverse effects: none  Sleep: unchanged  Appetite: unchanged  Behaviors over the past 24 hours: unchanged    Laboratory results:    I have personally reviewed all pertinent laboratory/tests results  No " results found for this or any previous visit (from the past 48 hours).       Progress Toward Goals & Illness Status:   progressing, mood is stabilizing, discharge planning, placement pending    Patient is not at goal. They are not yet ready for discharge. The patient's condition currently requires active psychopharmacological medication management, interdisciplinary coordination with case management, and the utilization of adjunctive milieu and group therapy to augment psychopharmacological efficacy. The patient's risk of morbidity, and progression or decompensation of psychiatric disease, is higher without this current treatment.     Next of Kin  Extended Emergency Contact Information  Primary Emergency Contact: Lois Roberson  Address: [address]           [address] [place]  Home Phone: [phone]  Relation: Mother    Counseling / Coordination of Care  Patient's progress discussed with staff in treatment team meeting.  Medications, treatment progress and treatment plan reviewed with patient.  Medication education provided to patient.  Educated on importance of medication and treatment compliance.  Supportive therapy provided to patient.  Cognitive techniques utilized during the session.  Reassurance and supportive therapy provided.  Reoriented to reality and reassured.  Encouraged participation in milieu and group therapy on the unit.  Crisis/safety plan discussed with patient.       Dustin Mcallister MD  Attending Psychiatrist   [facility]      This note has been constructed using a voice recognition system. There may be translation, syntax, or grammatical errors. If you have any questions, please contact the dictating provider.

## 2024-12-03 NOTE — PLAN OF CARE
Problem: Ineffective Coping  Goal: Participates in unit activities  Description: Interventions:  - Provide therapeutic environment   - Provide required programming   - Redirect inappropriate behaviors   Outcome: Progressing     Problem: Depression  Goal: Treatment Goal: Demonstrate behavioral control of depressive symptoms, verbalize feelings of improved mood/affect, and adopt new coping skills prior to discharge  Outcome: Progressing  Goal: Verbalize thoughts and feelings  Description: Interventions:  - Assess and re-assess patient's level of risk   - Engage patient in 1:1 interactions, daily, for a minimum of 15 minutes   - Encourage patient to express feelings, fears, frustrations, hopes   Outcome: Progressing  Goal: Refrain from harming self  Description: Interventions:  - Monitor patient closely, per order   - Supervise medication ingestion, monitor effects and side effects   Outcome: Progressing  Goal: Refrain from isolation  Description: Interventions:  - Develop a trusting relationship   - Encourage socialization   Outcome: Progressing     Problem: Anxiety  Goal: Anxiety is at manageable level  Description: Interventions:  - Assess and monitor patient's anxiety level.   - Monitor for signs and symptoms (heart palpitations, chest pain, shortness of breath, headaches, nausea, feeling jumpy, restlessness, irritable, apprehensive).   - Collaborate with interdisciplinary team and initiate plan and interventions as ordered.  - [place] patient to unit/surroundings  - Explain treatment plan  - Encourage participation in care  - Encourage verbalization of concerns/fears  - Identify coping mechanisms  - Assist in developing anxiety-reducing skills  - Administer/offer alternative therapies  - Limit or eliminate stimulants  Outcome: Progressing     Problem: DISCHARGE PLANNING - CARE MANAGEMENT  Goal: Discharge to post-acute care or home with appropriate resources  Description: INTERVENTIONS:  - Conduct assessment to  determine patient/family and health care team treatment goals, and need for post-acute services based on payer coverage, community resources, and patient preferences, and barriers to discharge  - Address psychosocial, clinical, and financial barriers to discharge as identified in assessment in conjunction with the patient/family and health care team  - Arrange appropriate level of post-acute services according to patient’s   needs and preference and payer coverage in collaboration with the physician and health care team  - Communicate with and update the patient/family, physician, and health care team regarding progress on the discharge plan  - Arrange appropriate transportation to post-acute venues  Outcome: Progressing     Problem: Knowledge Deficit  Goal: Patient/family/caregiver demonstrates understanding of disease process, treatment plan, medications, and discharge instructions  Description: Complete learning assessment and assess knowledge base.  Interventions:  - Provide teaching at level of understanding  - Provide teaching via preferred learning methods  Outcome: Progressing     Problem: SLEEP DISTURBANCE  Goal: Will exhibit normal sleeping pattern  Description: Interventions:  -  Assess the patients sleep pattern, noting recent changes  - Administer medication as ordered  - Decrease environmental stimuli, including noise, as appropriate during the night  - Encourage the patient to actively participate in unit groups and or exercise during the day to enhance ability to achieve adequate sleep at night  - Assess the patient, in the morning, encouraging a description of sleep experience  Outcome: Progressing

## 2024-12-03 NOTE — PROGRESS NOTES
12/03/24 1548   Team Meeting   Meeting Type Daily Rounds   Team Members Present   Team Members Present Physician;;Nurse   Physician Team Member Ary   [facility] Noa   Patient/Family Present   Patient Present No   Patient's Family Present No     Pt denies SI/HI/AVH. Pt is medication and meal compliant. Pt's discharge is pending placement and SSA appointment in two weeks.

## 2024-12-03 NOTE — NURSING NOTE
Pt is out of room for meals, needs and occasionally groups. Denies SI/HI/AH/VH at this time. Medication and meal compliant. Denies any unmet needs or complaints at this time.

## 2024-12-04 PROCEDURE — 99232 SBSQ HOSP IP/OBS MODERATE 35: CPT | Performed by: PSYCHIATRY & NEUROLOGY

## 2024-12-04 RX ADMIN — FAMOTIDINE 20 MG: 20 TABLET ORAL at 08:26

## 2024-12-04 RX ADMIN — ARIPIPRAZOLE 5 MG: 5 TABLET ORAL at 08:26

## 2024-12-04 RX ADMIN — CYANOCOBALAMIN TAB 1000 MCG 1000 MCG: 1000 TAB at 08:26

## 2024-12-04 RX ADMIN — FAMOTIDINE 20 MG: 20 TABLET ORAL at 17:12

## 2024-12-04 RX ADMIN — SERTRALINE HYDROCHLORIDE 150 MG: 100 TABLET ORAL at 08:26

## 2024-12-04 RX ADMIN — Medication 2500 UNITS: at 08:26

## 2024-12-04 RX ADMIN — SENNOSIDES AND DOCUSATE SODIUM 2 TABLET: 50; 8.6 TABLET ORAL at 17:12

## 2024-12-04 RX ADMIN — LEVOTHYROXINE SODIUM 37.5 MCG: 125 TABLET ORAL at 06:31

## 2024-12-04 RX ADMIN — ATORVASTATIN CALCIUM 10 MG: 10 TABLET, FILM COATED ORAL at 17:12

## 2024-12-04 NOTE — NURSING NOTE
Pt visible on unit, observed speaking on the unit phone tonight. Pt walking hallway, calm with depressed affect. Pt denies SI/HI/AH/VH and unmet needs currently.

## 2024-12-04 NOTE — PROGRESS NOTES
12/04/24 0850   Team Meeting   Meeting Type Daily Rounds   Team Members Present   Team Members Present Physician;Nurse;   Physician Team Member Ary   [facility] Suhas   Patient/Family Present   Patient Present No   Patient's Family Present No     Pt med/meal compliant. Visible on the unit, pleasant, calm, cooperative. Discharge pending placement.

## 2024-12-04 NOTE — ASSESSMENT & PLAN NOTE
"No medications changes at this time, optimize as indicated;  Patient continues to be pending group home placement. No clinical significant change noted today upon encounter.  Pt denied SSI and SSD, CM cont to work on personal care home placement and assisting pt w/ finances. Worked w/ SW to place money in [employer] account. Pending SSC meeting this month.    Abilify 5 mg daily as mood adjunct   Zoloft 150 mg daily for depression and anxiety  Continue to encourage participation in group therapy, milieu therapy and occupational therapy.  Continue to assess for side effects of medications.  Continue collaboration with PATRICIA for medical co-morbidities as indicated.  Continue discussion with CM/SW to assist with obtaining collateral, disposition planning, and the implementation of patient-centered individualized plan of care.  Continue frequent safety checks and vitals per unit protocol.    Risks, benefits and possible side effects of Medications: Risks, benefits, and possible side effects of medications have previously been explained. No new medications at this time.      Legal status: 201    Disposition: to be determined, pending SOAR application for potential group home placement. OT Cognitive Evaluation completed: \"Pt would benefit from discharge to a supportive environment that can provide checks for safety and compliance with IADLs. \"   Although patient's mood has stabilized, they are currently awaiting group home placement.  Their ability to recognize safety hazards take medications and participate in IADLs are significantly impaired by their cognitive deficits compounded by their chronic mental health needs and would be at risk for significant decompensation without the structure and support of a group home setting.      No associated orders from this encounter found during lookback period of 72 hours.  "

## 2024-12-04 NOTE — NURSING NOTE
Pt denies SI/HI/AH/VH. Pt appears depressed at times but denies feeling depressed. Present in milieu but is mostly isolative to room and self. Medication and meal compliant. Scant/guarded with communication. Pt attended groups. Compliant with Lunch. No further concerns as of present. Plan of care ongoing.

## 2024-12-04 NOTE — PLAN OF CARE
Problem: Depression  Goal: Treatment Goal: Demonstrate behavioral control of depressive symptoms, verbalize feelings of improved mood/affect, and adopt new coping skills prior to discharge  Outcome: Progressing  Goal: Verbalize thoughts and feelings  Description: Interventions:  - Assess and re-assess patient's level of risk   - Engage patient in 1:1 interactions, daily, for a minimum of 15 minutes   - Encourage patient to express feelings, fears, frustrations, hopes   Outcome: Progressing  Goal: Refrain from harming self  Description: Interventions:  - Monitor patient closely, per order   - Supervise medication ingestion, monitor effects and side effects   Outcome: Progressing  Goal: Refrain from isolation  Description: Interventions:  - Develop a trusting relationship   - Encourage socialization   Outcome: Progressing     Problem: Anxiety  Goal: Anxiety is at manageable level  Description: Interventions:  - Assess and monitor patient's anxiety level.   - Monitor for signs and symptoms (heart palpitations, chest pain, shortness of breath, headaches, nausea, feeling jumpy, restlessness, irritable, apprehensive).   - Collaborate with interdisciplinary team and initiate plan and interventions as ordered.  - [place] patient to unit/surroundings  - Explain treatment plan  - Encourage participation in care  - Encourage verbalization of concerns/fears  - Identify coping mechanisms  - Assist in developing anxiety-reducing skills  - Administer/offer alternative therapies  - Limit or eliminate stimulants  Outcome: Progressing     Problem: DISCHARGE PLANNING - CARE MANAGEMENT  Goal: Discharge to post-acute care or home with appropriate resources  Description: INTERVENTIONS:  - Conduct assessment to determine patient/family and health care team treatment goals, and need for post-acute services based on payer coverage, community resources, and patient preferences, and barriers to discharge  - Address psychosocial, clinical,  and financial barriers to discharge as identified in assessment in conjunction with the patient/family and health care team  - Arrange appropriate level of post-acute services according to patient’s   needs and preference and payer coverage in collaboration with the physician and health care team  - Communicate with and update the patient/family, physician, and health care team regarding progress on the discharge plan  - Arrange appropriate transportation to post-acute venues  Outcome: Progressing     Problem: Knowledge Deficit  Goal: Patient/family/caregiver demonstrates understanding of disease process, treatment plan, medications, and discharge instructions  Description: Complete learning assessment and assess knowledge base.  Interventions:  - Provide teaching at level of understanding  - Provide teaching via preferred learning methods  Outcome: Progressing     Problem: SLEEP DISTURBANCE  Goal: Will exhibit normal sleeping pattern  Description: Interventions:  -  Assess the patients sleep pattern, noting recent changes  - Administer medication as ordered  - Decrease environmental stimuli, including noise, as appropriate during the night  - Encourage the patient to actively participate in unit groups and or exercise during the day to enhance ability to achieve adequate sleep at night  - Assess the patient, in the morning, encouraging a description of sleep experience  Outcome: Progressing

## 2024-12-04 NOTE — PROGRESS NOTES
"Progress Note - Behavioral Health     Franco Roberson 39 y.o. male MRN: [id number]   Unit/Bed#: U 347-02 Encounter: [id number]  Assessment & Plan  MDD (major depressive disorder), recurrent severe, without psychosis (HCC)  No medications changes at this time, optimize as indicated;  Patient continues to be pending group home placement. No clinical significant change noted today upon encounter.  Pt denied SSI and SSD, CM cont to work on personal care home placement and assisting pt w/ finances. Worked w/ SW to place money in [employer] account. Pending SSC meeting this month.    Abilify 5 mg daily as mood adjunct   Zoloft 150 mg daily for depression and anxiety  Continue to encourage participation in group therapy, milieu therapy and occupational therapy.  Continue to assess for side effects of medications.  Continue collaboration with SLIM for medical co-morbidities as indicated.  Continue discussion with CM/SW to assist with obtaining collateral, disposition planning, and the implementation of patient-centered individualized plan of care.  Continue frequent safety checks and vitals per unit protocol.    Risks, benefits and possible side effects of Medications: Risks, benefits, and possible side effects of medications have previously been explained. No new medications at this time.      Legal status: 201    Disposition: to be determined, pending SOAR application for potential group home placement. OT Cognitive Evaluation completed: \"Pt would benefit from discharge to a supportive environment that can provide checks for safety and compliance with IADLs. \"   Although patient's mood has stabilized, they are currently awaiting group home placement.  Their ability to recognize safety hazards take medications and participate in IADLs are significantly impaired by their cognitive deficits compounded by their chronic mental health needs and would be at risk for significant decompensation without the structure and support of a group " home setting.      No associated orders from this encounter found during lookback period of 72 hours.  Autism spectrum disorder  Continue supportive care    No associated orders from this encounter found during lookback period of 72 hours.         Recommended Treatment:     Planned medication and treatment changes:    All current active medications have been reviewed  Encourage group therapy, milieu therapy and occupational therapy  Behavioral Health checks for safety monitoring      Current medications:  Current Facility-Administered Medications   Medication Dose Route Frequency Provider Last Rate    acetaminophen  650 mg Oral Q6H PRN Basilio Brown MD      acetaminophen  650 mg Oral Q4H PRN Basilio Brown MD      acetaminophen  975 mg Oral Q6H PRN Basilio Brown MD      aluminum-magnesium hydroxide-simethicone  30 mL Oral Q4H PRN Basilio Brown MD      ARIPiprazole  5 mg Oral Daily Basilio Panda MD      Artificial Tears  1 drop Both Eyes Q3H PRN Basilio Brown MD      atorvastatin  10 mg Oral Daily With Dinner WALLY Baptiste      benztropine  1 mg Intramuscular Q4H PRN Max 6/day Basilio Brown MD      benztropine  1 mg Oral Q4H PRN Max 6/day Basilio Brown MD      Cholecalciferol  2,500 Units Oral Daily Dustin Mcallister MD      cyanocobalamin  1,000 mcg Oral Daily WALLY Baptiste      Diclofenac Sodium  2 g Topical 4x Daily PRN WALLY Baptiste      hydrOXYzine HCL  50 mg Oral Q6H PRN Max 4/day Basilio Brown MD      Or    diphenhydrAMINE  50 mg Intramuscular Q6H PRN Basilio Brown MD      diphenhydrAMINE-zinc acetate   Topical Daily PRN Raj Guerrero MD      famotidine  20 mg Oral BID WALLY Baptiste      hydrOXYzine HCL  100 mg Oral Q6H PRN Max 4/day Basilio Brown MD      Or    LORazepam  2 mg Intramuscular Q6H PRN Basilio Brown MD      hydrOXYzine HCL  25 mg Oral Q6H PRN Max 4/day Basilio Garcia  "MD Karyn      levothyroxine  37.5 mcg Oral Early Morning WALLY Baptiste      melatonin  6 mg Oral HS PRN WALLY Gomez      methocarbamol  500 mg Oral Q6H PRN WALLY Baptiste      OLANZapine  5 mg Oral Q4H PRN Max 3/day Basilio Brown MD      Or    OLANZapine  2.5 mg Intramuscular Q4H PRN Max 3/day Basilio Brown MD      OLANZapine  5 mg Oral Q3H PRN Max 3/day Basilio Brown MD      Or    OLANZapine  5 mg Intramuscular Q3H PRN Max 3/day Basilio Brown MD      OLANZapine  2.5 mg Oral Q4H PRN Max 6/day Basilio Brown MD      polyethylene glycol  17 g Oral Daily PRN Basilio Brown MD      propranolol  10 mg Oral Q8H PRN Basilio Brown MD      senna-docusate sodium  2 tablet Oral After Dinner Dustin Mcallister MD      sertraline  150 mg Oral Daily WALLY Gomez      traZODone  50 mg Oral HS PRN Basilio Brown MD         Risks / Benefits of Treatment:    Risks, benefits, and possible side effects of medications explained to patient and patient verbalizes understanding and agreement for treatment.    Subjective:    Behavior over the last 24 hours: unchanged.     Franco was seen today in follow-up for continuation of care. Per staff, he has been pleasant, cooperative, and often walking the halls. No acute issues or behavioral disturbances reported overnight. Franco was seen today outside of his room, he for the most part cooperative with interview, but appears sullen. He reports that he is \"doing good.\" He denies any depressive or anxiety symptoms. No changes in appetite or energy levels. He was notified about the pending placement into group home which he was understanding of, \"it is what it is.\" Denies AVH and no overt delusions present. Franco adamantly denies suicidal/homicidal ideation in addition to thoughts of self-injury. Franco presently is contacting for safety on the unit and feels comfortable to confide in staff if " "thoughts arise. At time of interview, no overt psychosis elicited. Franco has been complaint with medications and tolerating without any side effects.     Sleep: normal  Appetite: normal  Medication side effects: No   ROS: no complaints    Mental Status Evaluation:    Appearance:  casually dressed, greasy hair pulled back in ponytail, marginal hygiene   Behavior:  pleasant, calm, superficially cooperative   Speech:  normal rate and volume   Mood:  \"Doing good\"   Affect:  constricted, appears sullen   Thought Process:  goal directed, linear   Associations: intact associations   Thought Content:  no overt delusions   Perceptual Disturbances: no auditory hallucinations, no visual hallucinations   Risk Potential: Suicidal ideation - None at present  Homicidal ideation - None at present  Potential for aggression - No   Sensorium:  oriented to person, place, and time/date   Memory:  recent and remote memory grossly intact   Consciousness:  alert   Attention/Concentration: attention span and concentration are age appropriate   Insight:  fair   Judgment: fair   Gait/Station: normal gait/station   Motor Activity: no abnormal movements     Vital signs in last 24 hours:    Temp:  [97 °F (36.1 °C)] 97 °F (36.1 °C)  HR:  [67-76] 67  BP: (127-141)/(72-75) 127/75  Resp:  [16] 16  SpO2:  [95 %-99 %] 95 %  O2 Device: None (Room air)    Laboratory results: I have personally reviewed all pertinent laboratory/tests results    Results from the past 24 hours: No results found for this or any previous visit (from the past 24 hours).    Suicide/Homicide Risk Assessment:    Risk of Harm to Self:   Nursing Suicide Risk Assessment Last 24 hours: C-SSRS Risk (Since Last Contact)  Calculated C-SSRS Risk Score (Since Last Contact): No Risk Indicated    Risk of Harm to Others:  Nursing Homicide Risk Assessment: Violence Risk to Others: Denies within past 6 months    The following interventions are recommended: Behavioral Health checks for safety " monitoring, continued hospitalization on locked unit    Progress Toward Goals: progressing    Counseling / Coordination of Care:    Total floor / unit time spent today 25 minutes. Greater than 50% of total time was spent with the patient and / or family counseling and / or coordination of care. A description of counseling / coordination of care:    Elizabeth Pennington PA-C 12/04/24

## 2024-12-05 PROCEDURE — 99232 SBSQ HOSP IP/OBS MODERATE 35: CPT

## 2024-12-05 RX ADMIN — CYANOCOBALAMIN TAB 1000 MCG 1000 MCG: 1000 TAB at 08:23

## 2024-12-05 RX ADMIN — SERTRALINE HYDROCHLORIDE 150 MG: 100 TABLET ORAL at 08:23

## 2024-12-05 RX ADMIN — SENNOSIDES AND DOCUSATE SODIUM 2 TABLET: 50; 8.6 TABLET ORAL at 17:34

## 2024-12-05 RX ADMIN — Medication 2500 UNITS: at 08:23

## 2024-12-05 RX ADMIN — ARIPIPRAZOLE 5 MG: 5 TABLET ORAL at 08:22

## 2024-12-05 RX ADMIN — LEVOTHYROXINE SODIUM 37.5 MCG: 125 TABLET ORAL at 06:17

## 2024-12-05 RX ADMIN — ATORVASTATIN CALCIUM 10 MG: 10 TABLET, FILM COATED ORAL at 17:34

## 2024-12-05 RX ADMIN — FAMOTIDINE 20 MG: 20 TABLET ORAL at 17:34

## 2024-12-05 RX ADMIN — FAMOTIDINE 20 MG: 20 TABLET ORAL at 08:23

## 2024-12-05 NOTE — PROGRESS NOTES
"Progress Note - Behavioral Health   Name: Franco Roberson 39 y.o. male I MRN: [id number]  Unit/Bed#: -02 I Date of Admission: 5/14/2024   Date of Service: 12/5/2024 I Hospital Day: 205     Assessment & Plan  MDD (major depressive disorder), recurrent severe, without psychosis (HCC)  No medications changes at this time, optimize as indicated;  Patient continues to be pending group home placement. No clinical significant change noted today upon encounter.  Pt denied SSI and SSD, CM cont to work on personal care home placement and assisting pt w/ finances. Worked w/ SW to place money in [employer] account. Pending SSC meeting this month.    Abilify 5 mg daily as mood adjunct   Zoloft 150 mg daily for depression and anxiety  Continue to encourage participation in group therapy, milieu therapy and occupational therapy.  Continue to assess for side effects of medications.  Continue collaboration with SLIM for medical co-morbidities as indicated.  Continue discussion with CM/SW to assist with obtaining collateral, disposition planning, and the implementation of patient-centered individualized plan of care.  Continue frequent safety checks and vitals per unit protocol.    Risks, benefits and possible side effects of Medications: Risks, benefits, and possible side effects of medications have previously been explained. No new medications at this time.      Legal status: 201    Disposition: to be determined, pending SOAR application for potential group home placement. OT Cognitive Evaluation completed: \"Pt would benefit from discharge to a supportive environment that can provide checks for safety and compliance with IADLs. \"   Although patient's mood has stabilized, they are currently awaiting group home placement.  Their ability to recognize safety hazards take medications and participate in IADLs are significantly impaired by their cognitive deficits compounded by their chronic mental health needs and would be at risk for " "significant decompensation without the structure and support of a group home setting.      No associated orders from this encounter found during lookback period of 72 hours.  Autism spectrum disorder  Continue supportive care    No associated orders from this encounter found during lookback period of 72 hours.      Planned medication and treatment changes:    All current active medications have been reviewed  Encourage group therapy, milieu therapy and occupational therapy  Behavioral Health checks for safety monitoring  Continue current medications:  Awaiting placement at this time.    Behavior over the last 24 hours: unchanged.     Franco is seen today for psychiatric follow up. Per nursing notes, denies SI/HI/AVH, appears depressed at times but denies this. Present in the milieu but isolative to self. Med/meal compliant. Scant/guarded.  Patient remains in behavioral control. No psych prns in last 24 hours.     Today Franco is seen participating in group, calm, superficially cooperative and pleasant. He is pleasant on approach and notes he is doing \"very well.\" He notes that he has been able to sleep better than in the past and wakes up feeling refreshed. Mostly concrete in conversation and does not offer further information unless specifically asked. Denies SI/HI/AVH and does not appear to be responding to internal stimuli. He denies depression however does appear dysphoric/withdrawn. Denies psychiatric symptoms and notes that he is just patiently looking forward to placement. He denies medication side effects and denies further questions/concerns. Attends groups.    Sleep: slept better  Appetite: normal  Medication side effects: No   ROS: no complaints    Mental Status Evaluation:    Appearance:  age appropriate, casually dressed, marginal hygiene, greasy hair pulled into a ponytail.   Behavior:  pleasant, calm, superficially cooperative   Speech:  normal rate and volume, clear   Mood:  \"Very well.\"   Affect:  " blunted   Thought Process:  goal directed, linear   Associations: intact associations   Thought Content:  no overt delusions   Perceptual Disturbances: denies auditory hallucinations when asked, does not appear responding to internal stimuli, denies visual hallucinations when asked   Risk Potential: Suicidal ideation - None  Homicidal ideation - None  Potential for aggression - No   Sensorium:  oriented to person, place, and time/date   Memory:  recent and remote memory grossly intact   Consciousness:  alert and awake   Attention/Concentration: attention span and concentration are age appropriate   Insight:  limited   Judgment: fair   Gait/Station: normal gait/station   Motor Activity: no abnormal movements     Vital signs in last 24 hours:    Temp:  [97.3 °F (36.3 °C)-97.6 °F (36.4 °C)] 97.6 °F (36.4 °C)  HR:  [73-77] 77  BP: (111-120)/(65-76) 120/76  Resp:  [16] 16  SpO2:  [95 %-97 %] 97 %  O2 Device: None (Room air)    Laboratory results: I have personally reviewed all pertinent laboratory/tests results    Results from the past 24 hours: No results found for this or any previous visit (from the past 24 hours).    Progress Toward Goals: Awaiting placement at this time.    Assessment & Plan   Principal Problem:    MDD (major depressive disorder), recurrent severe, without psychosis (HCC)  Active Problems:    Unspecified depressive disorder (HCC)    Medical clearance for psychiatric admission    Hyperlipidemia    Vitamin D deficiency    B12 deficiency    Hypothyroidism    Autism spectrum disorder        Current Facility-Administered Medications   Medication Dose Route Frequency Provider Last Rate    acetaminophen  650 mg Oral Q6H PRN Basilio Brown MD      acetaminophen  650 mg Oral Q4H PRN Basilio Brown MD      acetaminophen  975 mg Oral Q6H PRN Basilio Brown MD      aluminum-magnesium hydroxide-simethicone  30 mL Oral Q4H PRN Basilio Brown MD      ARIPiprazole  5 mg Oral Daily Basilio Aldana  MD Amarilys      Artificial Tears  1 drop Both Eyes Q3H PRN Basilio Brown MD      atorvastatin  10 mg Oral Daily With Dinner WALLY Baptiste      benztropine  1 mg Intramuscular Q4H PRN Max 6/day Basilio Brown MD      benztropine  1 mg Oral Q4H PRN Max 6/day Basilio Brown MD      Cholecalciferol  2,500 Units Oral Daily Dustin Mcallister MD      cyanocobalamin  1,000 mcg Oral Daily WALLY Baptiste      Diclofenac Sodium  2 g Topical 4x Daily PRN WALLY Baptiste      hydrOXYzine HCL  50 mg Oral Q6H PRN Max 4/day Basilio Brown MD      Or    diphenhydrAMINE  50 mg Intramuscular Q6H PRN Basilio Brown MD      diphenhydrAMINE-zinc acetate   Topical Daily PRN Raj Guerrero MD      famotidine  20 mg Oral BID WALLY Baptiste      hydrOXYzine HCL  100 mg Oral Q6H PRN Max 4/day Basilio Brown MD      Or    LORazepam  2 mg Intramuscular Q6H PRN Basilio Brown MD      hydrOXYzine HCL  25 mg Oral Q6H PRN Max 4/day Basilio Brown MD      levothyroxine  37.5 mcg Oral Early Morning WALLY Baptiste      melatonin  6 mg Oral HS PRN WALLY Gomez      methocarbamol  500 mg Oral Q6H PRN WALLY Baptiste      OLANZapine  5 mg Oral Q4H PRN Max 3/day Basilio Brown MD      Or    OLANZapine  2.5 mg Intramuscular Q4H PRN Max 3/day Basilio Brown MD      OLANZapine  5 mg Oral Q3H PRN Max 3/day Basilio Brown MD      Or    OLANZapine  5 mg Intramuscular Q3H PRN Max 3/day Basilio Brown MD      OLANZapine  2.5 mg Oral Q4H PRN Max 6/day Basilio Brown MD      polyethylene glycol  17 g Oral Daily PRN Basilio Brown MD      propranolol  10 mg Oral Q8H PRN Basilio Brown MD      senna-docusate sodium  2 tablet Oral After Dinner Dustin Mcallister MD      sertraline  150 mg Oral Daily WALLY Gomez      traZODone  50 mg Oral HS PRN Basilio Brown MD       Risks / Benefits  of Treatment:    Risks, benefits, and possible side effects of medications explained to patient and patient verbalizes understanding and agreement for treatment.    Counseling / Coordination of Care:    Patient's progress discussed with staff in treatment team meeting.  Medication education provided to patient.  Educated on importance of medication and treatment compliance.  Reassurance and supportive therapy provided.    Zander Husrt PA-C 12/05/24

## 2024-12-05 NOTE — NURSING NOTE
Pt minimally visible, walking hallway intermittently, calm with depressed affect. Pt pleasant during interactions. Pt denies SI/HI/AH/VH and unmet needs currently.

## 2024-12-05 NOTE — PROGRESS NOTES
12/05/24 0902   Team Meeting   Meeting Type Daily Rounds   Team Members Present   Team Members Present Physician;Nurse;   Physician Team Member Criss   [facility]   Care Management Team Member Suhas   Patient/Family Present   Patient Present No   Patient's Family Present No     Pt med/meal compliant. Visible on the unit, pleasant, calm, cooperative. Discharge pending placement.

## 2024-12-05 NOTE — NURSING NOTE
Pt denies SI/HI/AH/VH. Present in milieu but is mostly isolative to his room. Medication and meal compliant. Brightens on approach. Scant with communication. Compliant with Lunch. No further concerns as of present. Plan of care ongoing.

## 2024-12-05 NOTE — PLAN OF CARE
Problem: Ineffective Coping  Goal: Participates in unit activities  Description: Interventions:  - Provide therapeutic environment   - Provide required programming   - Redirect inappropriate behaviors   Outcome: Progressing     Problem: Depression  Goal: Treatment Goal: Demonstrate behavioral control of depressive symptoms, verbalize feelings of improved mood/affect, and adopt new coping skills prior to discharge  Outcome: Progressing  Goal: Verbalize thoughts and feelings  Description: Interventions:  - Assess and re-assess patient's level of risk   - Engage patient in 1:1 interactions, daily, for a minimum of 15 minutes   - Encourage patient to express feelings, fears, frustrations, hopes   Outcome: Progressing  Goal: Refrain from harming self  Description: Interventions:  - Monitor patient closely, per order   - Supervise medication ingestion, monitor effects and side effects   Outcome: Progressing  Goal: Refrain from isolation  Description: Interventions:  - Develop a trusting relationship   - Encourage socialization   Outcome: Progressing     Problem: Anxiety  Goal: Anxiety is at manageable level  Description: Interventions:  - Assess and monitor patient's anxiety level.   - Monitor for signs and symptoms (heart palpitations, chest pain, shortness of breath, headaches, nausea, feeling jumpy, restlessness, irritable, apprehensive).   - Collaborate with interdisciplinary team and initiate plan and interventions as ordered.  - [place] patient to unit/surroundings  - Explain treatment plan  - Encourage participation in care  - Encourage verbalization of concerns/fears  - Identify coping mechanisms  - Assist in developing anxiety-reducing skills  - Administer/offer alternative therapies  - Limit or eliminate stimulants  Outcome: Progressing     Problem: DISCHARGE PLANNING - CARE MANAGEMENT  Goal: Discharge to post-acute care or home with appropriate resources  Description: INTERVENTIONS:  - Conduct assessment to  determine patient/family and health care team treatment goals, and need for post-acute services based on payer coverage, community resources, and patient preferences, and barriers to discharge  - Address psychosocial, clinical, and financial barriers to discharge as identified in assessment in conjunction with the patient/family and health care team  - Arrange appropriate level of post-acute services according to patient’s   needs and preference and payer coverage in collaboration with the physician and health care team  - Communicate with and update the patient/family, physician, and health care team regarding progress on the discharge plan  - Arrange appropriate transportation to post-acute venues  Outcome: Progressing     Problem: Knowledge Deficit  Goal: Patient/family/caregiver demonstrates understanding of disease process, treatment plan, medications, and discharge instructions  Description: Complete learning assessment and assess knowledge base.  Interventions:  - Provide teaching at level of understanding  - Provide teaching via preferred learning methods  Outcome: Progressing     Problem: SLEEP DISTURBANCE  Goal: Will exhibit normal sleeping pattern  Description: Interventions:  -  Assess the patients sleep pattern, noting recent changes  - Administer medication as ordered  - Decrease environmental stimuli, including noise, as appropriate during the night  - Encourage the patient to actively participate in unit groups and or exercise during the day to enhance ability to achieve adequate sleep at night  - Assess the patient, in the morning, encouraging a description of sleep experience  Outcome: Progressing

## 2024-12-06 PROCEDURE — 99232 SBSQ HOSP IP/OBS MODERATE 35: CPT | Performed by: PSYCHIATRY & NEUROLOGY

## 2024-12-06 RX ADMIN — ATORVASTATIN CALCIUM 10 MG: 10 TABLET, FILM COATED ORAL at 17:17

## 2024-12-06 RX ADMIN — Medication 2500 UNITS: at 09:44

## 2024-12-06 RX ADMIN — SENNOSIDES AND DOCUSATE SODIUM 2 TABLET: 50; 8.6 TABLET ORAL at 17:17

## 2024-12-06 RX ADMIN — FAMOTIDINE 20 MG: 20 TABLET ORAL at 08:33

## 2024-12-06 RX ADMIN — ARIPIPRAZOLE 5 MG: 5 TABLET ORAL at 08:33

## 2024-12-06 RX ADMIN — LEVOTHYROXINE SODIUM 37.5 MCG: 125 TABLET ORAL at 06:49

## 2024-12-06 RX ADMIN — FAMOTIDINE 20 MG: 20 TABLET ORAL at 17:17

## 2024-12-06 RX ADMIN — SERTRALINE HYDROCHLORIDE 150 MG: 100 TABLET ORAL at 08:33

## 2024-12-06 RX ADMIN — CYANOCOBALAMIN TAB 1000 MCG 1000 MCG: 1000 TAB at 08:33

## 2024-12-06 NOTE — PROGRESS NOTES
12/06/24 0900   Team Meeting   Meeting Type Daily Rounds   Team Members Present   Team Members Present Physician;Nurse;   Physician Team Member Ary   [facility] Suhas   Patient/Family Present   Patient Present No   Patient's Family Present No     Pt med/meal compliant. Visible on the unit. Pleasant, calm, cooperative. Discharge pending placement.

## 2024-12-06 NOTE — PLAN OF CARE
Problem: Depression  Goal: Treatment Goal: Demonstrate behavioral control of depressive symptoms, verbalize feelings of improved mood/affect, and adopt new coping skills prior to discharge  Outcome: Progressing  Goal: Verbalize thoughts and feelings  Description: Interventions:  - Assess and re-assess patient's level of risk   - Engage patient in 1:1 interactions, daily, for a minimum of 15 minutes   - Encourage patient to express feelings, fears, frustrations, hopes   Outcome: Progressing  Goal: Refrain from harming self  Description: Interventions:  - Monitor patient closely, per order   - Supervise medication ingestion, monitor effects and side effects   Outcome: Progressing  Goal: Refrain from isolation  Description: Interventions:  - Develop a trusting relationship   - Encourage socialization   Outcome: Progressing     Problem: Anxiety  Goal: Anxiety is at manageable level  Description: Interventions:  - Assess and monitor patient's anxiety level.   - Monitor for signs and symptoms (heart palpitations, chest pain, shortness of breath, headaches, nausea, feeling jumpy, restlessness, irritable, apprehensive).   - Collaborate with interdisciplinary team and initiate plan and interventions as ordered.  - Friona patient to unit/surroundings  - Explain treatment plan  - Encourage participation in care  - Encourage verbalization of concerns/fears  - Identify coping mechanisms  - Assist in developing anxiety-reducing skills  - Administer/offer alternative therapies  - Limit or eliminate stimulants  Outcome: Progressing     Problem: DISCHARGE PLANNING - CARE MANAGEMENT  Goal: Discharge to post-acute care or home with appropriate resources  Description: INTERVENTIONS:  - Conduct assessment to determine patient/family and health care team treatment goals, and need for post-acute services based on payer coverage, community resources, and patient preferences, and barriers to discharge  - Address psychosocial, clinical,  and financial barriers to discharge as identified in assessment in conjunction with the patient/family and health care team  - Arrange appropriate level of post-acute services according to patient’s   needs and preference and payer coverage in collaboration with the physician and health care team  - Communicate with and update the patient/family, physician, and health care team regarding progress on the discharge plan  - Arrange appropriate transportation to post-acute venues  Outcome: Progressing     Problem: Knowledge Deficit  Goal: Patient/family/caregiver demonstrates understanding of disease process, treatment plan, medications, and discharge instructions  Description: Complete learning assessment and assess knowledge base.  Interventions:  - Provide teaching at level of understanding  - Provide teaching via preferred learning methods  Outcome: Progressing     Problem: SLEEP DISTURBANCE  Goal: Will exhibit normal sleeping pattern  Description: Interventions:  -  Assess the patients sleep pattern, noting recent changes  - Administer medication as ordered  - Decrease environmental stimuli, including noise, as appropriate during the night  - Encourage the patient to actively participate in unit groups and or exercise during the day to enhance ability to achieve adequate sleep at night  - Assess the patient, in the morning, encouraging a description of sleep experience  Outcome: Progressing

## 2024-12-06 NOTE — SOCIAL WORK
Individual session completed with pt. Preparation for Social Security call and group home/independent living discussed.

## 2024-12-06 NOTE — NURSING NOTE
Patient has been about the unit. Doing laps in the hallway alone. Attending some groups. Appears sad/depressed. Pleasant and cooperative on approach. Denies symptoms. Compliant with medications. Waiting placement.

## 2024-12-06 NOTE — NURSING NOTE
Patient is withdrawn to his room most of the night. He is pleasant on approach but not social with peers or staff. Patient is calm and compliant and does not report any unmet needs. Denies all psych symptoms but is visibly depressed. Q 15 min rounding maintained for safety.

## 2024-12-06 NOTE — PROGRESS NOTES
12/06/24 0843   Team Meeting   Meeting Type Daily Rounds   Team Members Present   Team Members Present Physician;;Nurse   Physician Team Member Criss   [facility]   Patient/Family Present   Patient Present No   Patient's Family Present No     Pt is calm and compliant. Pt is medication and meal compliant. Pt is calm and compliant. Pt's discharge is pending placement.

## 2024-12-06 NOTE — PLAN OF CARE
Pt attends some groups and participates appropriately. Pt typically quiet but participates when prompted.

## 2024-12-06 NOTE — NURSING NOTE
Patient remains withdrawn to self, limited interaction with peers and staff. Patient denies SI/HI/AVH. Patient is calm, cooperative and compliant with medications. No complaints or unmet needs identified at this time. Q 15 minute safety checks maintained.    No

## 2024-12-06 NOTE — PROGRESS NOTES
"Progress Note - Behavioral Health   Name: Franco Roberson 39 y.o. male I MRN: [id number]  Unit/Bed#: -02 I Date of Admission: 5/14/2024   Date of Service: 12/6/2024 I Hospital Day: 206     Assessment & Plan  MDD (major depressive disorder), recurrent severe, without psychosis (HCC)  No medications changes at this time, optimize as indicated;  Patient continues to be pending group home placement. No clinical significant change noted today upon encounter.  Pt denied SSI and SSD, CM cont to work on personal care home placement and assisting pt w/ finances. Worked w/ SW to place money in [employer] account. Pending SSC meeting this month.    Abilify 5 mg daily as mood adjunct   Zoloft 150 mg daily for depression and anxiety  Continue to encourage participation in group therapy, milieu therapy and occupational therapy.  Continue to assess for side effects of medications.  Continue collaboration with SLIM for medical co-morbidities as indicated.  Continue discussion with CM/SW to assist with obtaining collateral, disposition planning, and the implementation of patient-centered individualized plan of care.  Continue frequent safety checks and vitals per unit protocol.    Risks, benefits and possible side effects of Medications: Risks, benefits, and possible side effects of medications have previously been explained. No new medications at this time.      Legal status: 201    Disposition: to be determined, pending SOAR application for potential group home placement. OT Cognitive Evaluation completed: \"Pt would benefit from discharge to a supportive environment that can provide checks for safety and compliance with IADLs. \"   Although patient's mood has stabilized, they are currently awaiting group home placement.  Their ability to recognize safety hazards take medications and participate in IADLs are significantly impaired by their cognitive deficits compounded by their chronic mental health needs and would be at risk for " significant decompensation without the structure and support of a group home setting.      No associated orders from this encounter found during lookback period of 72 hours.  Autism spectrum disorder  Continue supportive care    No associated orders from this encounter found during lookback period of 72 hours.        Plan     Recommended Treatment:    - Encourage early mobility and having a structured day  - Provide frequent re-orientation, and cognitive stimulation  - Ensure assistive devices are in proper working order (eye-glasses, hearing aids)  - Encourage adequate hydration, nutrition and monitor bowel movements  - Maintain sleep-wake cycle: Uninterrupted sleep time; low-level lighting at night  - Fall precaution  - f/u SLIM recs regarding the medical problems   - Continue medication titration and treatment plan; adjust medication to optimize treatment response and as clinically indicated. .   - Observation: routine            - VS: as per unit protocol  - Diet: Regular diet  - Encourage group attendance and milieu therapy  - Dispo: To be determined     Scheduled medications:  Current Facility-Administered Medications   Medication Dose Route Frequency Provider Last Rate    acetaminophen  650 mg Oral Q6H PRN Basilio Brown MD      acetaminophen  650 mg Oral Q4H PRN Basilio Brown MD      acetaminophen  975 mg Oral Q6H PRN Basilio Brown MD      aluminum-magnesium hydroxide-simethicone  30 mL Oral Q4H PRN Basilio Brown MD      ARIPiprazole  5 mg Oral Daily Basilio Panda MD      Artificial Tears  1 drop Both Eyes Q3H PRN Basilio Brown MD      atorvastatin  10 mg Oral Daily With Dinner WALLY Baptiste      benztropine  1 mg Intramuscular Q4H PRN Max 6/day Basilio Brown MD      benztropine  1 mg Oral Q4H PRN Max 6/day Basilio Brown MD      Cholecalciferol  2,500 Units Oral Daily Dustin Mcallister MD      cyanocobalamin  1,000 mcg Oral Daily Laura Clark  WALLY Ashford      Diclofenac Sodium  2 g Topical 4x Daily PRN WALLY Baptiste      hydrOXYzine HCL  50 mg Oral Q6H PRN Max 4/day Basilio Brown MD      Or    diphenhydrAMINE  50 mg Intramuscular Q6H PRN Basilio Brown MD      diphenhydrAMINE-zinc acetate   Topical Daily PRN Raj Guerrero MD      famotidine  20 mg Oral BID WALLY Baptiste      hydrOXYzine HCL  100 mg Oral Q6H PRN Max 4/day Basilio Brown MD      Or    LORazepam  2 mg Intramuscular Q6H PRN Basilio Brown MD      hydrOXYzine HCL  25 mg Oral Q6H PRN Max 4/day Basilio Brown MD      levothyroxine  37.5 mcg Oral Early Morning WALLY Baptiste      melatonin  6 mg Oral HS PRN WALLY Gomez      methocarbamol  500 mg Oral Q6H PRN WALLY Baptiste      OLANZapine  5 mg Oral Q4H PRN Max 3/day Basilio Brown MD      Or    OLANZapine  2.5 mg Intramuscular Q4H PRN Max 3/day Basilio Brown MD      OLANZapine  5 mg Oral Q3H PRN Max 3/day Basilio Brown MD      Or    OLANZapine  5 mg Intramuscular Q3H PRN Max 3/day Basilio Brown MD      OLANZapine  2.5 mg Oral Q4H PRN Max 6/day Basilio Brown MD      polyethylene glycol  17 g Oral Daily PRN Basilio Brown MD      propranolol  10 mg Oral Q8H PRN Basilio Brown MD      senna-docusate sodium  2 tablet Oral After Dinner Dustin Mcallister MD      sertraline  150 mg Oral Daily WALLY Gomez      traZODone  50 mg Oral HS PRN Basilio Brown MD        PRN:    acetaminophen    acetaminophen    acetaminophen    aluminum-magnesium hydroxide-simethicone    Artificial Tears    benztropine    benztropine    Diclofenac Sodium    hydrOXYzine HCL **OR** diphenhydrAMINE    diphenhydrAMINE-zinc acetate    hydrOXYzine HCL **OR** LORazepam    hydrOXYzine HCL    melatonin    methocarbamol    OLANZapine **OR** OLANZapine    OLANZapine **OR** OLANZapine    OLANZapine    polyethylene glycol     "propranolol    traZODone       Subjective     Patient was visited on unit for continuing care; chart reviewed and discussed with multidisciplinary treatment team.  On approach, the patient was calm and cooperative. Denied any changes in mood, appetite, and energy level. Cont to engage well w/ select staff and participate here and there w/ group No problem initiating and maintaining sleep.  Denied A/VH currently.  Denied SI/HI, intent or plan upon direct inquiry at this time.    Patient continues to be visible in the milieu and interacts with staff and peers. No reports of aggression or self-injurious behavior on unit. No PRN medications used in the past 24 hours.    Patient accepted all offered medications and no adverse effects of medications noted or reported.    Objective    Current Mental Status Evaluation:  Appearance: casually dressed, consistent with stated age  Motor: no psychomotor retardation, no gait abnormalities  Behavior: cooperative, answers questions appropriately  Speech: soft, normal rhythm  Mood: \"good\"  Affect: blunted  Thought Process: linear and goal-oriented  Thought Content: denies delusions or paranoia  Risk Potential: denies suicidal ideation, plan, or intent. Denies homicidal ideation  Perceptions: denies auditory hallucinations, denies visual hallucinations,   Sensorium: Oriented to person, place, time, and situation  Cognition: cognitive ability appears intact but was not quantitatively tested  Consciousness: alert and awake  Attention: intact, able to focus without difficulty  Insight: limited  Judgement: limited            Vital signs in last 24 hours:    Temp:  [97.5 °F (36.4 °C)-97.6 °F (36.4 °C)] 97.5 °F (36.4 °C)  HR:  [78-87] 78  BP: (113-132)/(66-67) 113/66  Resp:  [16] 16  SpO2:  [96 %-98 %] 98 %  O2 Device: None (Room air)    Psychiatric Review of Systems:  Medication adverse effects: none  Sleep: unchanged  Appetite: unchanged  Behaviors over the past 24 hours: " unchanged    Laboratory results:    I have personally reviewed all pertinent laboratory/tests results  No results found for this or any previous visit (from the past 48 hours).       Progress Toward Goals & Illness Status:   progressing, mood is stabilizing, depression is improving, discharge planning, placement pending    Patient is not at goal. They are not yet ready for discharge. The patient's condition currently requires active psychopharmacological medication management, interdisciplinary coordination with case management, and the utilization of adjunctive milieu and group therapy to augment psychopharmacological efficacy. The patient's risk of morbidity, and progression or decompensation of psychiatric disease, is higher without this current treatment.     Next of Kin  Extended Emergency Contact Information  Primary Emergency Contact: Lois Roberson  Address: [address] [place] [address]  Home Phone: [phone]  Relation: Mother    Counseling / Coordination of Care  Patient's progress discussed with staff in treatment team meeting.  Medications, treatment progress and treatment plan reviewed with patient.  Medication education provided to patient.  Educated on importance of medication and treatment compliance.  Supportive therapy provided to patient.  Cognitive techniques utilized during the session.  Reassurance and supportive therapy provided.  Reoriented to reality and reassured.  Encouraged participation in milieu and group therapy on the unit.  Crisis/safety plan discussed with patient.       Dustin Mcallister MD  Attending Psychiatrist   [facility]      This note has been constructed using a voice recognition system. There may be translation, syntax, or grammatical errors. If you have any questions, please contact the dictating provider.

## 2024-12-07 PROCEDURE — 99232 SBSQ HOSP IP/OBS MODERATE 35: CPT | Performed by: STUDENT IN AN ORGANIZED HEALTH CARE EDUCATION/TRAINING PROGRAM

## 2024-12-07 RX ADMIN — FAMOTIDINE 20 MG: 20 TABLET ORAL at 08:29

## 2024-12-07 RX ADMIN — SENNOSIDES AND DOCUSATE SODIUM 2 TABLET: 50; 8.6 TABLET ORAL at 17:22

## 2024-12-07 RX ADMIN — SERTRALINE HYDROCHLORIDE 150 MG: 100 TABLET ORAL at 08:29

## 2024-12-07 RX ADMIN — Medication 2500 UNITS: at 08:29

## 2024-12-07 RX ADMIN — ATORVASTATIN CALCIUM 10 MG: 10 TABLET, FILM COATED ORAL at 17:22

## 2024-12-07 RX ADMIN — CYANOCOBALAMIN TAB 1000 MCG 1000 MCG: 1000 TAB at 08:29

## 2024-12-07 RX ADMIN — FAMOTIDINE 20 MG: 20 TABLET ORAL at 17:22

## 2024-12-07 RX ADMIN — LEVOTHYROXINE SODIUM 37.5 MCG: 125 TABLET ORAL at 06:16

## 2024-12-07 RX ADMIN — ARIPIPRAZOLE 5 MG: 5 TABLET ORAL at 08:29

## 2024-12-07 NOTE — PROGRESS NOTES
"Progress Note - Behavioral Health   Franco Roberson 39 y.o. male MRN: [id number]  Unit/Bed#: [facility] 347-02 Encounter: [id number]    All documentation, nursing notes, labs, and vitals reviewed.  The patient's medication reconciliation chart was also analyzed for medication adherence.  I personally evaluated Franco Roberson and discussed current care with treatment team.    No acute events overnight. On examination, Franco is pleasant and cooperative. He denies adverse medication side effects. Franco reports psychiatric stability at the moment. He rates his overall state of MH as \"8/10\" (10 being best he ever felt, 0 being worst). His sleep and appetite are stable. He denies SI/HI today. His motivation and energy wax and wane as he is subjectively bored at times. No current anhedonia or crying spells. He is feeling more hopeful about his future given pending SSI/SSD case. Franco currently endorses occasional and appropriate anxiety that is not pathologic in nature. Franco denies excessive nervousness, irrational worry, or overt anxiousness. Franco is not currently restless or tense nor does Franco feel \"keyed up\" or on-edge. Franco denies new-onset panic symptomatology or maladaptive behaviors. Throughout today's session, Franco does not appear visibly perturbed. Acutely, Franco does not exhibit objective evidence of patrick/hypomania or psychosis. Franco offers no further concerns.     Mental Status Evaluation:    Appearance:  disheveled, looks older than stated age, overweight   Behavior:  pleasant, cooperative, calm, good eye contact   Speech:  normal rate, normal volume, normal pitch   Mood:  euthymic   Affect:  normal range and intensity, appropriate   Thought Process:  organized, logical, coherent, goal directed   Associations: intact associations   Thought Content:  no overt delusions   Perceptual Disturbances: no auditory hallucinations, no visual hallucinations   Risk Potential: Suicidal ideation - None at " present  Homicidal ideation - None at present  Potential for aggression - No   Sensorium:  oriented to person, place, and time/date   Memory:  recent and remote memory grossly intact   Consciousness:  alert and awake    Attention: attention span and concentration are age appropriate   Insight:  good   Judgment: good   Gait/Station: in bed   Motor Activity: no abnormal movements       Assessment:     Principal Problem:    MDD (major depressive disorder), recurrent severe, without psychosis (HCC)  Active Problems:    Unspecified depressive disorder (HCC)    Medical clearance for psychiatric admission    Hyperlipidemia    Vitamin D deficiency    B12 deficiency    Hypothyroidism    Autism spectrum disorder      Plan/Recommended Treatment:     - Continue with pharmacotherapy, group therapy, milieu therapy and occupational therapy.    - Risks/benefits/alternatives to treatment discussed and Franco Roberson continues to verbalize understanding   - No psychopharmacologic changes necessary at this juncture, continue scheduled psychotropic agents at current doses (see below)   - Will consider further optimization of psychotropic medication regimen as hospital course progresses   - Continue to assess for adverse medication side effects.  - Medical management as per SLIM recs  - Encourage Franco Roberson to participate in nonverbal forms of therapy including journaling and art/music therapy  - Continue precautionary Q7-minute safety checks.  - Continue to engage case management/SW to assist with collateral information, discharge planning, and the implementation of an individualized, patient-centered plan of care.  - The patient will be maintained on the following medications:    Current Facility-Administered Medications   Medication Dose Route Frequency Provider Last Rate    acetaminophen  650 mg Oral Q6H PRN Basilio Brown MD      acetaminophen  650 mg Oral Q4H PRN Basilio Brown MD      acetaminophen  975 mg Oral Q6H PRN  Basilio Brown MD      aluminum-magnesium hydroxide-simethicone  30 mL Oral Q4H PRN Basilio Brown MD      ARIPiprazole  5 mg Oral Daily Basilio Panda MD      Artificial Tears  1 drop Both Eyes Q3H PRN Basilio Brown MD      atorvastatin  10 mg Oral Daily With Dinner WALLY Baptiste      benztropine  1 mg Intramuscular Q4H PRN Max 6/day Basilio Brown MD      benztropine  1 mg Oral Q4H PRN Max 6/day Basilio Brown MD      Cholecalciferol  2,500 Units Oral Daily Dustin Mcallister MD      cyanocobalamin  1,000 mcg Oral Daily WALLY Baptiste      Diclofenac Sodium  2 g Topical 4x Daily PRN WALLY Baptiste      hydrOXYzine HCL  50 mg Oral Q6H PRN Max 4/day Basilio Brown MD      Or    diphenhydrAMINE  50 mg Intramuscular Q6H PRN Basilio Brown MD      diphenhydrAMINE-zinc acetate   Topical Daily PRN Raj Guerrero MD      famotidine  20 mg Oral BID WALLY Baptiste      hydrOXYzine HCL  100 mg Oral Q6H PRN Max 4/day Basilio Brown MD      Or    LORazepam  2 mg Intramuscular Q6H PRN Basilio Brown MD      hydrOXYzine HCL  25 mg Oral Q6H PRN Max 4/day Basilio Brown MD      levothyroxine  37.5 mcg Oral Early Morning WALLY Baptiste      melatonin  6 mg Oral HS PRN WALLY Gomez      methocarbamol  500 mg Oral Q6H PRN WALLY Baptiste      OLANZapine  5 mg Oral Q4H PRN Max 3/day Basilio Brown MD      Or    OLANZapine  2.5 mg Intramuscular Q4H PRN Max 3/day Basilio Brown MD      OLANZapine  5 mg Oral Q3H PRN Max 3/day Basilio Brown MD      Or    OLANZapine  5 mg Intramuscular Q3H PRN Max 3/day Basilio Brown MD      OLANZapine  2.5 mg Oral Q4H PRN Max 6/day Basilio Brown MD      polyethylene glycol  17 g Oral Daily PRN Basilio Brown MD      propranolol  10 mg Oral Q8H PRN Basilio Brown MD      senna-docusate sodium  2 tablet Oral After Dinner  Dustin Mcallister MD      sertraline  150 mg Oral Daily WALLY Gomez      traZODone  50 mg Oral HS PRN Basilio Brown MD

## 2024-12-07 NOTE — NURSING NOTE
Patient is isolative to self and room, but visible at times walking unit. Patient is calm and cooperative upon approach. Patient denies SI/Hi/AH/VH. Patient is compliant with meds and meals.

## 2024-12-07 NOTE — NURSING NOTE
Patient is calm and cooperative upon approach. Patient is visible on unit, but remains isolative to self. Patient denies SI/HI/AH/VH. Patient is compliant with meds and meals.

## 2024-12-07 NOTE — PLAN OF CARE
Problem: Ineffective Coping  Goal: Participates in unit activities  Description: Interventions:  - Provide therapeutic environment   - Provide required programming   - Redirect inappropriate behaviors   Outcome: Progressing     Problem: Depression  Goal: Treatment Goal: Demonstrate behavioral control of depressive symptoms, verbalize feelings of improved mood/affect, and adopt new coping skills prior to discharge  Outcome: Progressing  Goal: Verbalize thoughts and feelings  Description: Interventions:  - Assess and re-assess patient's level of risk   - Engage patient in 1:1 interactions, daily, for a minimum of 15 minutes   - Encourage patient to express feelings, fears, frustrations, hopes   Outcome: Progressing  Goal: Refrain from harming self  Description: Interventions:  - Monitor patient closely, per order   - Supervise medication ingestion, monitor effects and side effects   Outcome: Progressing  Goal: Refrain from isolation  Description: Interventions:  - Develop a trusting relationship   - Encourage socialization   Outcome: Progressing     Problem: Anxiety  Goal: Anxiety is at manageable level  Description: Interventions:  - Assess and monitor patient's anxiety level.   - Monitor for signs and symptoms (heart palpitations, chest pain, shortness of breath, headaches, nausea, feeling jumpy, restlessness, irritable, apprehensive).   - Collaborate with interdisciplinary team and initiate plan and interventions as ordered.  - [place] patient to unit/surroundings  - Explain treatment plan  - Encourage participation in care  - Encourage verbalization of concerns/fears  - Identify coping mechanisms  - Assist in developing anxiety-reducing skills  - Administer/offer alternative therapies  - Limit or eliminate stimulants  Outcome: Progressing     Problem: DISCHARGE PLANNING - CARE MANAGEMENT  Goal: Discharge to post-acute care or home with appropriate resources  Description: INTERVENTIONS:  - Conduct assessment to  determine patient/family and health care team treatment goals, and need for post-acute services based on payer coverage, community resources, and patient preferences, and barriers to discharge  - Address psychosocial, clinical, and financial barriers to discharge as identified in assessment in conjunction with the patient/family and health care team  - Arrange appropriate level of post-acute services according to patient’s   needs and preference and payer coverage in collaboration with the physician and health care team  - Communicate with and update the patient/family, physician, and health care team regarding progress on the discharge plan  - Arrange appropriate transportation to post-acute venues  Outcome: Progressing     Problem: Knowledge Deficit  Goal: Patient/family/caregiver demonstrates understanding of disease process, treatment plan, medications, and discharge instructions  Description: Complete learning assessment and assess knowledge base.  Interventions:  - Provide teaching at level of understanding  - Provide teaching via preferred learning methods  Outcome: Progressing     Problem: SLEEP DISTURBANCE  Goal: Will exhibit normal sleeping pattern  Description: Interventions:  -  Assess the patients sleep pattern, noting recent changes  - Administer medication as ordered  - Decrease environmental stimuli, including noise, as appropriate during the night  - Encourage the patient to actively participate in unit groups and or exercise during the day to enhance ability to achieve adequate sleep at night  - Assess the patient, in the morning, encouraging a description of sleep experience  Outcome: Progressing

## 2024-12-08 PROCEDURE — 99232 SBSQ HOSP IP/OBS MODERATE 35: CPT | Performed by: STUDENT IN AN ORGANIZED HEALTH CARE EDUCATION/TRAINING PROGRAM

## 2024-12-08 RX ADMIN — Medication 2500 UNITS: at 08:19

## 2024-12-08 RX ADMIN — CYANOCOBALAMIN TAB 1000 MCG 1000 MCG: 1000 TAB at 08:19

## 2024-12-08 RX ADMIN — ARIPIPRAZOLE 5 MG: 5 TABLET ORAL at 08:19

## 2024-12-08 RX ADMIN — LEVOTHYROXINE SODIUM 37.5 MCG: 125 TABLET ORAL at 06:29

## 2024-12-08 RX ADMIN — FAMOTIDINE 20 MG: 20 TABLET ORAL at 18:02

## 2024-12-08 RX ADMIN — SENNOSIDES AND DOCUSATE SODIUM 2 TABLET: 50; 8.6 TABLET ORAL at 18:02

## 2024-12-08 RX ADMIN — ATORVASTATIN CALCIUM 10 MG: 10 TABLET, FILM COATED ORAL at 18:02

## 2024-12-08 RX ADMIN — FAMOTIDINE 20 MG: 20 TABLET ORAL at 08:19

## 2024-12-08 RX ADMIN — SERTRALINE HYDROCHLORIDE 150 MG: 100 TABLET ORAL at 08:19

## 2024-12-08 NOTE — NURSING NOTE
Patient is calm and brightens upon approach. Remains isolative to room and self, visible for meals and needs. Patient denies SI/HI/AVH and pain. Medication and meal compliant.

## 2024-12-08 NOTE — PROGRESS NOTES
"Progress Note - Behavioral Health   Franco Roberson 39 y.o. male MRN: [id number]  Unit/Bed#: [facility] 347-02 Encounter: [id number]    All documentation, nursing notes, labs, and vitals reviewed.  The patient's medication reconciliation chart was also analyzed for medication adherence.  I personally evaluated Franco Roberson and discussed current care with treatment team.    No acute events overnight. Franco was pleasant and cooperative upon approach. He was ambulating the halls as a means of exercising. We discussed the benefits of physical activity and ways to maintain such a regimen upon discharge. Franco's sleep and appetite are stable. He denies SI/HI today. His energy and motivation are satisfactory. No crying spells or feelings of despair over the last 24 hours. Franco denies new onset anxiety or panic symptoms. He is not on-edge, tense, or restless during our examination. He did voice some distress regarding lack of control he has over SSI/SSD and discharge. Supportive therapy and cognitive reframing utilized. We processed how he's truly only in control of 2 things: his attitude and his work ethic. Franco was receptive and admits that he will \"continue to go with the flow\". During today's examination, Franco does not exhibit objective evidence of patrick/hypomania or psychosis. Franco offers no further concerns.     Mental Status Evaluation:    Appearance:  marginal hygiene, overweight   Behavior:  pleasant, cooperative, calm   Speech:  normal rate, normal volume, normal pitch   Mood:  euthymic   Affect:  constricted   Thought Process:  organized, logical, coherent   Associations: intact associations   Thought Content:  no overt delusions   Perceptual Disturbances: no auditory hallucinations, no visual hallucinations   Risk Potential: Suicidal ideation - None at present  Homicidal ideation - None at present  Potential for aggression - No   Sensorium:  oriented to person, place, and time/date   Memory:  recent and remote " memory grossly intact   Consciousness:  alert and awake    Attention: attention span and concentration are age appropriate   Insight:  good   Judgment: good   Gait/Station: normal gait/station   Motor Activity: no abnormal movements       Assessment:     Principal Problem:    MDD (major depressive disorder), recurrent severe, without psychosis (HCC)  Active Problems:    Unspecified depressive disorder (HCC)    Medical clearance for psychiatric admission    Hyperlipidemia    Vitamin D deficiency    B12 deficiency    Hypothyroidism    Autism spectrum disorder      Plan/Recommended Treatment:     - Continue with pharmacotherapy, group therapy, milieu therapy and occupational therapy.    - Risks/benefits/alternatives to treatment discussed and Franco Roberson continues to verbalize understanding   - No psychopharmacologic changes necessary at this juncture, continue scheduled psychotropic agents at current doses (see below)   - Will consider further optimization of psychotropic medication regimen as hospital course progresses   - Continue to assess for adverse medication side effects.  - Medical management as per SLIM recs  - Encourage Franco Roberson to participate in nonverbal forms of therapy including journaling and art/music therapy  - Continue precautionary Q7-minute safety checks.  - Continue to engage case management/SW to assist with collateral information, discharge planning, and the implementation of an individualized, patient-centered plan of care.  - The patient will be maintained on the following medications:    Current Facility-Administered Medications   Medication Dose Route Frequency Provider Last Rate    acetaminophen  650 mg Oral Q6H PRN Basilio Brown MD      acetaminophen  650 mg Oral Q4H PRN Basilio Brown MD      acetaminophen  975 mg Oral Q6H PRN Basilio Brown MD      aluminum-magnesium hydroxide-simethicone  30 mL Oral Q4H PRN Basilio Brown MD      ARIPiprazole  5 mg Oral Daily  Basilio Panda MD      Artificial Tears  1 drop Both Eyes Q3H PRN Basilio Brown MD      atorvastatin  10 mg Oral Daily With Dinner WALLY Baptiste      benztropine  1 mg Intramuscular Q4H PRN Max 6/day Basilio Brown MD      benztropine  1 mg Oral Q4H PRN Max 6/day Basilio Brown MD      Cholecalciferol  2,500 Units Oral Daily Dustin Mcallister MD      cyanocobalamin  1,000 mcg Oral Daily WALLY Baptiste      Diclofenac Sodium  2 g Topical 4x Daily PRN WALLY Baptiste      hydrOXYzine HCL  50 mg Oral Q6H PRN Max 4/day Basilio Brown MD      Or    diphenhydrAMINE  50 mg Intramuscular Q6H PRN Basilio Brown MD      diphenhydrAMINE-zinc acetate   Topical Daily PRN Raj Guerrero MD      famotidine  20 mg Oral BID WALLY Baptiste      hydrOXYzine HCL  100 mg Oral Q6H PRN Max 4/day Basilio Brown MD      Or    LORazepam  2 mg Intramuscular Q6H PRN Basilio Brown MD      hydrOXYzine HCL  25 mg Oral Q6H PRN Max 4/day Basilio Brown MD      levothyroxine  37.5 mcg Oral Early Morning WALLY Baptiste      melatonin  6 mg Oral HS PRN WALLY Gomez      methocarbamol  500 mg Oral Q6H PRN WALLY Baptiste      OLANZapine  5 mg Oral Q4H PRN Max 3/day Basilio Brown MD      Or    OLANZapine  2.5 mg Intramuscular Q4H PRN Max 3/day Basilio Brown MD      OLANZapine  5 mg Oral Q3H PRN Max 3/day Basilio Brown MD      Or    OLANZapine  5 mg Intramuscular Q3H PRN Max 3/day Basilio Brown MD      OLANZapine  2.5 mg Oral Q4H PRN Max 6/day Basilio Brown MD      polyethylene glycol  17 g Oral Daily PRN Basilio Brown MD      propranolol  10 mg Oral Q8H PRN Basilio Brown MD      senna-docusate sodium  2 tablet Oral After Dinner Dustin Mcallister MD      sertraline  150 mg Oral Daily WALLY Gomez      traZODone  50 mg Oral HS PRN Basilio Brown MD

## 2024-12-08 NOTE — PLAN OF CARE
Problem: Depression  Goal: Treatment Goal: Demonstrate behavioral control of depressive symptoms, verbalize feelings of improved mood/affect, and adopt new coping skills prior to discharge  Outcome: Progressing  Goal: Verbalize thoughts and feelings  Description: Interventions:  - Assess and re-assess patient's level of risk   - Engage patient in 1:1 interactions, daily, for a minimum of 15 minutes   - Encourage patient to express feelings, fears, frustrations, hopes   Outcome: Progressing  Goal: Refrain from harming self  Description: Interventions:  - Monitor patient closely, per order   - Supervise medication ingestion, monitor effects and side effects   Outcome: Progressing  Goal: Refrain from isolation  Description: Interventions:  - Develop a trusting relationship   - Encourage socialization   Outcome: Progressing     Problem: Anxiety  Goal: Anxiety is at manageable level  Description: Interventions:  - Assess and monitor patient's anxiety level.   - Monitor for signs and symptoms (heart palpitations, chest pain, shortness of breath, headaches, nausea, feeling jumpy, restlessness, irritable, apprehensive).   - Collaborate with interdisciplinary team and initiate plan and interventions as ordered.  - [place] patient to unit/surroundings  - Explain treatment plan  - Encourage participation in care  - Encourage verbalization of concerns/fears  - Identify coping mechanisms  - Assist in developing anxiety-reducing skills  - Administer/offer alternative therapies  - Limit or eliminate stimulants  Outcome: Progressing     Problem: DISCHARGE PLANNING - CARE MANAGEMENT  Goal: Discharge to post-acute care or home with appropriate resources  Description: INTERVENTIONS:  - Conduct assessment to determine patient/family and health care team treatment goals, and need for post-acute services based on payer coverage, community resources, and patient preferences, and barriers to discharge  - Address psychosocial, clinical,  and financial barriers to discharge as identified in assessment in conjunction with the patient/family and health care team  - Arrange appropriate level of post-acute services according to patient’s   needs and preference and payer coverage in collaboration with the physician and health care team  - Communicate with and update the patient/family, physician, and health care team regarding progress on the discharge plan  - Arrange appropriate transportation to post-acute venues  Outcome: Progressing     Problem: Knowledge Deficit  Goal: Patient/family/caregiver demonstrates understanding of disease process, treatment plan, medications, and discharge instructions  Description: Complete learning assessment and assess knowledge base.  Interventions:  - Provide teaching at level of understanding  - Provide teaching via preferred learning methods  Outcome: Progressing     Problem: SLEEP DISTURBANCE  Goal: Will exhibit normal sleeping pattern  Description: Interventions:  -  Assess the patients sleep pattern, noting recent changes  - Administer medication as ordered  - Decrease environmental stimuli, including noise, as appropriate during the night  - Encourage the patient to actively participate in unit groups and or exercise during the day to enhance ability to achieve adequate sleep at night  - Assess the patient, in the morning, encouraging a description of sleep experience  Outcome: Progressing     Problem: Ineffective Coping  Goal: Participates in unit activities  Description: Interventions:  - Provide therapeutic environment   - Provide required programming   - Redirect inappropriate behaviors   Outcome: Not Progressing

## 2024-12-09 PROCEDURE — 99232 SBSQ HOSP IP/OBS MODERATE 35: CPT | Performed by: PSYCHIATRY & NEUROLOGY

## 2024-12-09 RX ADMIN — ATORVASTATIN CALCIUM 10 MG: 10 TABLET, FILM COATED ORAL at 17:45

## 2024-12-09 RX ADMIN — SENNOSIDES AND DOCUSATE SODIUM 2 TABLET: 50; 8.6 TABLET ORAL at 17:45

## 2024-12-09 RX ADMIN — LEVOTHYROXINE SODIUM 37.5 MCG: 125 TABLET ORAL at 06:47

## 2024-12-09 RX ADMIN — Medication 2500 UNITS: at 08:15

## 2024-12-09 RX ADMIN — ARIPIPRAZOLE 5 MG: 5 TABLET ORAL at 08:15

## 2024-12-09 RX ADMIN — FAMOTIDINE 20 MG: 20 TABLET ORAL at 17:45

## 2024-12-09 RX ADMIN — CYANOCOBALAMIN TAB 1000 MCG 1000 MCG: 1000 TAB at 08:15

## 2024-12-09 RX ADMIN — SERTRALINE HYDROCHLORIDE 150 MG: 100 TABLET ORAL at 08:15

## 2024-12-09 RX ADMIN — FAMOTIDINE 20 MG: 20 TABLET ORAL at 08:15

## 2024-12-09 NOTE — NURSING NOTE
Pt is isolative to room and in bed. Pt is cooperative and med compliant. Pt denies depression and anxiety. Pt denies SI/HI and AH/VH. No unmet needs reported. Will continue to monitor.

## 2024-12-09 NOTE — PROGRESS NOTES
12/09/24 0852   Team Meeting   Meeting Type Daily Rounds   Team Members Present   Team Members Present Physician;Nurse;   Physician Team Member Ary   Nursing Team Member Talita   Care Management Team Member Suhas   Patient/Family Present   Patient Present No   Patient's Family Present No     Pt med/meal compliant. Visilble on the unit. Pleasant, calm, cooperative. Discharge pending placement.

## 2024-12-09 NOTE — PROGRESS NOTES
"Progress Note - Behavioral Health   Name: Franco Roberson 39 y.o. male I MRN: [id number]  Unit/Bed#: -02 I Date of Admission: 5/14/2024   Date of Service: 12/9/2024 I Hospital Day: 209     Assessment & Plan  MDD (major depressive disorder), recurrent severe, without psychosis (HCC)  No medications changes at this time, optimize as indicated;  Patient continues to be pending group home placement. No clinical significant change noted today upon encounter.  Pt denied SSI and SSD, CM cont to work on personal care home placement and assisting pt w/ finances. Worked w/ SW to place money in [employer] account. Pending SSC meeting this month on 12/16.    Abilify 5 mg daily as mood adjunct   Zoloft 150 mg daily for depression and anxiety  Continue to encourage participation in group therapy, milieu therapy and occupational therapy.  Continue to assess for side effects of medications.  Continue collaboration with PATRICIA for medical co-morbidities as indicated.  Continue discussion with CM/SW to assist with obtaining collateral, disposition planning, and the implementation of patient-centered individualized plan of care.  Continue frequent safety checks and vitals per unit protocol.    Risks, benefits and possible side effects of Medications: Risks, benefits, and possible side effects of medications have previously been explained. No new medications at this time.      Legal status: 201    Disposition: to be determined, pending SOAR application for potential group home placement. OT Cognitive Evaluation completed: \"Pt would benefit from discharge to a supportive environment that can provide checks for safety and compliance with IADLs. \"   Although patient's mood has stabilized, they are currently awaiting group home placement.  Their ability to recognize safety hazards take medications and participate in IADLs are significantly impaired by their cognitive deficits compounded by their chronic mental health needs and would be at risk " for significant decompensation without the structure and support of a group home setting.      No associated orders from this encounter found during lookback period of 72 hours.  Autism spectrum disorder  Continue supportive care    No associated orders from this encounter found during lookback period of 72 hours.        Plan     Recommended Treatment:    - Encourage early mobility and having a structured day  - Provide frequent re-orientation, and cognitive stimulation  - Ensure assistive devices are in proper working order (eye-glasses, hearing aids)  - Encourage adequate hydration, nutrition and monitor bowel movements  - Maintain sleep-wake cycle: Uninterrupted sleep time; low-level lighting at night  - Fall precaution  - f/u SLIM recs regarding the medical problems   - Continue medication titration and treatment plan; adjust medication to optimize treatment response and as clinically indicated. .   - Observation: routine            - VS: as per unit protocol  - Diet: Regular diet  - Encourage group attendance and milieu therapy  - Dispo: To be determined     Scheduled medications:  Current Facility-Administered Medications   Medication Dose Route Frequency Provider Last Rate    acetaminophen  650 mg Oral Q6H PRN Basilio Brown MD      acetaminophen  650 mg Oral Q4H PRN Basilio Brown MD      acetaminophen  975 mg Oral Q6H PRN Basilio Brown MD      aluminum-magnesium hydroxide-simethicone  30 mL Oral Q4H PRN Basilio Brown MD      ARIPiprazole  5 mg Oral Daily Basilio Panda MD      Artificial Tears  1 drop Both Eyes Q3H PRN Basilio Brown MD      atorvastatin  10 mg Oral Daily With Dinner WALLY Baptiste      benztropine  1 mg Intramuscular Q4H PRN Max 6/day Basilio Brown MD      benztropine  1 mg Oral Q4H PRN Max 6/day Basilio Brown MD      Cholecalciferol  2,500 Units Oral Daily Dustin Mcallister MD      cyanocobalamin  1,000 mcg Oral Daily Laura Clark  WALLY [place]      Diclofenac Sodium  2 g Topical 4x Daily PRN WALLY Baptiste      hydrOXYzine HCL  50 mg Oral Q6H PRN Max 4/day Basilio Brown MD      Or    diphenhydrAMINE  50 mg Intramuscular Q6H PRN Basilio Brown MD      diphenhydrAMINE-zinc acetate   Topical Daily PRN Raj Guerrero MD      famotidine  20 mg Oral BID WALLY Baptiste      hydrOXYzine HCL  100 mg Oral Q6H PRN Max 4/day Basilio Brown MD      Or    LORazepam  2 mg Intramuscular Q6H PRN Basilio Brown MD      hydrOXYzine HCL  25 mg Oral Q6H PRN Max 4/day Basilio Brown MD      levothyroxine  37.5 mcg Oral Early Morning WALLY Baptiste      melatonin  6 mg Oral HS PRN WALLY Gomez      methocarbamol  500 mg Oral Q6H PRN WALLY Baptiste      OLANZapine  5 mg Oral Q4H PRN Max 3/day Basilio Brown MD      Or    OLANZapine  2.5 mg Intramuscular Q4H PRN Max 3/day Basilio Brown MD      OLANZapine  5 mg Oral Q3H PRN Max 3/day Basilio Brown MD      Or    OLANZapine  5 mg Intramuscular Q3H PRN Max 3/day Basilio Brown MD      OLANZapine  2.5 mg Oral Q4H PRN Max 6/day Basilio Brown MD      polyethylene glycol  17 g Oral Daily PRN Basilio Brown MD      propranolol  10 mg Oral Q8H PRN Basilio Brown MD      senna-docusate sodium  2 tablet Oral After Dinner Dustin Mcallister MD      sertraline  150 mg Oral Daily WALLY Gomez      traZODone  50 mg Oral HS PRN Basilio Brown MD        PRN:    acetaminophen    acetaminophen    acetaminophen    aluminum-magnesium hydroxide-simethicone    Artificial Tears    benztropine    benztropine    Diclofenac Sodium    hydrOXYzine HCL **OR** diphenhydrAMINE    diphenhydrAMINE-zinc acetate    hydrOXYzine HCL **OR** LORazepam    hydrOXYzine HCL    melatonin    methocarbamol    OLANZapine **OR** OLANZapine    OLANZapine **OR** OLANZapine    OLANZapine    polyethylene glycol     "propranolol    traZODone       Subjective     Patient was visited on unit for continuing care; chart reviewed and discussed with multidisciplinary treatment team.  On approach, the patient was calm and cooperative. Denied any changes in mood, appetite, and energy level. Continuing to work w/ CM regarding dispo. Meeting scheduled w/ social security on 12/16 No problem initiating and maintaining sleep.  Denied A/VH currently.  Denied SI/HI, intent or plan upon direct inquiry at this time.    Patient continues to be visible in the milieu and interacts with staff and peers. No reports of aggression or self-injurious behavior on unit. No PRN medications used in the past 24 hours.    Patient accepted all offered medications and no adverse effects of medications noted or reported.    Objective    Current Mental Status Evaluation:  Mental Status Exam  Appearance: casually dressed, consistent with stated age  Motor: no psychomotor retardation, no gait abnormalities  Behavior: cooperative, answers questions appropriately  Speech: soft, normal rhythm  Mood: \"good\"  Affect: blunted  Thought Process: linear and goal-oriented  Thought Content: denies delusions  Risk Potential: denies suicidal ideation, plan, or intent. Denies homicidal ideation  Perceptions: denies auditory hallucinations, denies visual hallucinations,   Sensorium: Oriented to person, place, time, and situation  Cognition: cognitive ability appears intact but was not quantitatively tested  Consciousness: alert and awake  Attention: intact, able to focus without difficulty  Insight: limited  Judgement: limited            Vital signs in last 24 hours:    Temp:  [97.5 °F (36.4 °C)-97.6 °F (36.4 °C)] 97.5 °F (36.4 °C)  HR:  [55-57] 57  BP: (112-119)/(65-81) 119/81  Resp:  [16] 16  SpO2:  [97 %] 97 %  O2 Device: None (Room air)    Psychiatric Review of Systems:  Medication adverse effects: none  Sleep: unchanged  Appetite: unchanged  Behaviors over the past 24 hours: " unchanged    Laboratory results:    I have personally reviewed all pertinent laboratory/tests results  No results found for this or any previous visit (from the past 48 hours).       Progress Toward Goals & Illness Status:   progressing, attends groups, participates in milieu therapy, mood is stabilizing, discharge planning, placement pending    Patient is not at goal. They are not yet ready for discharge. The patient's condition currently requires active psychopharmacological medication management, interdisciplinary coordination with case management, and the utilization of adjunctive milieu and group therapy to augment psychopharmacological efficacy. The patient's risk of morbidity, and progression or decompensation of psychiatric disease, is higher without this current treatment.     Next of Kin  Extended Emergency Contact Information  Primary Emergency Contact: Lois Roberson  Address: [address] [place] [address]  Home Phone: [phone]  Relation: Mother    Counseling / Coordination of Care  Patient's progress discussed with staff in treatment team meeting.  Medications, treatment progress and treatment plan reviewed with patient.  Medication education provided to patient.  Educated on importance of medication and treatment compliance.  Supportive therapy provided to patient.  Cognitive techniques utilized during the session.  Reassurance and supportive therapy provided.  Reoriented to reality and reassured.  Encouraged participation in milieu and group therapy on the unit.  Crisis/safety plan discussed with patient.       Dustin Mcallister MD  Attending Psychiatrist   [facility]      This note has been constructed using a voice recognition system. There may be translation, syntax, or grammatical errors. If you have any questions, please contact the dictating provider.

## 2024-12-09 NOTE — PLAN OF CARE
Problem: Depression  Goal: Treatment Goal: Demonstrate behavioral control of depressive symptoms, verbalize feelings of improved mood/affect, and adopt new coping skills prior to discharge  Outcome: Progressing  Goal: Verbalize thoughts and feelings  Description: Interventions:  - Assess and re-assess patient's level of risk   - Engage patient in 1:1 interactions, daily, for a minimum of 15 minutes   - Encourage patient to express feelings, fears, frustrations, hopes   Outcome: Progressing  Goal: Refrain from harming self  Description: Interventions:  - Monitor patient closely, per order   - Supervise medication ingestion, monitor effects and side effects   Outcome: Progressing     Problem: Anxiety  Goal: Anxiety is at manageable level  Description: Interventions:  - Assess and monitor patient's anxiety level.   - Monitor for signs and symptoms (heart palpitations, chest pain, shortness of breath, headaches, nausea, feeling jumpy, restlessness, irritable, apprehensive).   - Collaborate with interdisciplinary team and initiate plan and interventions as ordered.  - [place] patient to unit/surroundings  - Explain treatment plan  - Encourage participation in care  - Encourage verbalization of concerns/fears  - Identify coping mechanisms  - Assist in developing anxiety-reducing skills  - Administer/offer alternative therapies  - Limit or eliminate stimulants  Outcome: Progressing     Problem: Knowledge Deficit  Goal: Patient/family/caregiver demonstrates understanding of disease process, treatment plan, medications, and discharge instructions  Description: Complete learning assessment and assess knowledge base.  Interventions:  - Provide teaching at level of understanding  - Provide teaching via preferred learning methods  Outcome: Progressing     Problem: Depression  Goal: Treatment Goal: Demonstrate behavioral control of depressive symptoms, verbalize feelings of improved mood/affect, and adopt new coping skills prior  to discharge  Outcome: Progressing  Goal: Verbalize thoughts and feelings  Description: Interventions:  - Assess and re-assess patient's level of risk   - Engage patient in 1:1 interactions, daily, for a minimum of 15 minutes   - Encourage patient to express feelings, fears, frustrations, hopes   Outcome: Progressing  Goal: Refrain from harming self  Description: Interventions:  - Monitor patient closely, per order   - Supervise medication ingestion, monitor effects and side effects   Outcome: Progressing     Problem: Anxiety  Goal: Anxiety is at manageable level  Description: Interventions:  - Assess and monitor patient's anxiety level.   - Monitor for signs and symptoms (heart palpitations, chest pain, shortness of breath, headaches, nausea, feeling jumpy, restlessness, irritable, apprehensive).   - Collaborate with interdisciplinary team and initiate plan and interventions as ordered.  - [place] patient to unit/surroundings  - Explain treatment plan  - Encourage participation in care  - Encourage verbalization of concerns/fears  - Identify coping mechanisms  - Assist in developing anxiety-reducing skills  - Administer/offer alternative therapies  - Limit or eliminate stimulants  Outcome: Progressing     Problem: Knowledge Deficit  Goal: Patient/family/caregiver demonstrates understanding of disease process, treatment plan, medications, and discharge instructions  Description: Complete learning assessment and assess knowledge base.  Interventions:  - Provide teaching at level of understanding  - Provide teaching via preferred learning methods  Outcome: Progressing

## 2024-12-09 NOTE — ASSESSMENT & PLAN NOTE
"No medications changes at this time, optimize as indicated;  Patient continues to be pending group home placement. No clinical significant change noted today upon encounter.  Pt denied SSI and SSD, CM cont to work on personal care home placement and assisting pt w/ finances. Worked w/ SW to place money in [employer] account. Pending SSC meeting this month on 12/16.    Abilify 5 mg daily as mood adjunct   Zoloft 150 mg daily for depression and anxiety  Continue to encourage participation in group therapy, milieu therapy and occupational therapy.  Continue to assess for side effects of medications.  Continue collaboration with SLIM for medical co-morbidities as indicated.  Continue discussion with CM/SW to assist with obtaining collateral, disposition planning, and the implementation of patient-centered individualized plan of care.  Continue frequent safety checks and vitals per unit protocol.    Risks, benefits and possible side effects of Medications: Risks, benefits, and possible side effects of medications have previously been explained. No new medications at this time.      Legal status: 201    Disposition: to be determined, pending SOAR application for potential group home placement. OT Cognitive Evaluation completed: \"Pt would benefit from discharge to a supportive environment that can provide checks for safety and compliance with IADLs. \"   Although patient's mood has stabilized, they are currently awaiting group home placement.  Their ability to recognize safety hazards take medications and participate in IADLs are significantly impaired by their cognitive deficits compounded by their chronic mental health needs and would be at risk for significant decompensation without the structure and support of a group home setting.      No associated orders from this encounter found during lookback period of 72 hours.  "

## 2024-12-09 NOTE — PLAN OF CARE
Problem: Depression  Goal: Treatment Goal: Demonstrate behavioral control of depressive symptoms, verbalize feelings of improved mood/affect, and adopt new coping skills prior to discharge  Outcome: Progressing  Goal: Verbalize thoughts and feelings  Description: Interventions:  - Assess and re-assess patient's level of risk   - Engage patient in 1:1 interactions, daily, for a minimum of 15 minutes   - Encourage patient to express feelings, fears, frustrations, hopes   12/9/2024 0248 by Neida Sharpe RN  Outcome: Progressing    Goal: Refrain from harming self  Description: Interventions:  - Monitor patient closely, per order   - Supervise medication ingestion, monitor effects and side effects   12/9/2024 0248 by Neida Sharpe RN  Outcome: Progressing  12/9/2024 0243 by Neida Sharpe RN  Outcome: Progressing  Goal: Refrain from isolation  Description: Interventions:  - Develop a trusting relationship   - Encourage socialization   Outcome: Progressing     Problem: Anxiety  Goal: Anxiety is at manageable level  Description: Interventions:  - Assess and monitor patient's anxiety level.   - Monitor for signs and symptoms (heart palpitations, chest pain, shortness of breath, headaches, nausea, feeling jumpy, restlessness, irritable, apprehensive).   - Collaborate with interdisciplinary team and initiate plan and interventions as ordered.  - [place] patient to unit/surroundings  - Explain treatment plan  - Encourage participation in care  - Encourage verbalization of concerns/fears  - Identify coping mechanisms  - Assist in developing anxiety-reducing skills  - Administer/offer alternative therapies  - Limit or eliminate stimulants  12/9/2024 0248 by Neida Sharpe RN  Outcome: Progressing     Problem: Knowledge Deficit  Goal: Patient/family/caregiver demonstrates understanding of disease process, treatment plan, medications, and discharge instructions  Description: Complete learning assessment and assess knowledge  base.  Interventions:  - Provide teaching at level of understanding  - Provide teaching via preferred learning methods  12/9/2024 0248 by Neida Sharpe RN  Outcome: Progressing     Problem: SLEEP DISTURBANCE  Goal: Will exhibit normal sleeping pattern  Description: Interventions:  -  Assess the patients sleep pattern, noting recent changes  - Administer medication as ordered  - Decrease environmental stimuli, including noise, as appropriate during the night  - Encourage the patient to actively participate in unit groups and or exercise during the day to enhance ability to achieve adequate sleep at night  - Assess the patient, in the morning, encouraging a description of sleep experience  Outcome: Progressing

## 2024-12-09 NOTE — NURSING NOTE
Patient is calm and cooperative upon approach. Patient is visible on unit, for meals, but mostly isolative to room and self. Patient denies SI/HI/AH/VH. Patient compliant with meds and meals.

## 2024-12-10 PROCEDURE — 99232 SBSQ HOSP IP/OBS MODERATE 35: CPT | Performed by: PSYCHIATRY & NEUROLOGY

## 2024-12-10 RX ADMIN — ARIPIPRAZOLE 5 MG: 5 TABLET ORAL at 08:40

## 2024-12-10 RX ADMIN — CYANOCOBALAMIN TAB 1000 MCG 1000 MCG: 1000 TAB at 08:40

## 2024-12-10 RX ADMIN — FAMOTIDINE 20 MG: 20 TABLET ORAL at 08:40

## 2024-12-10 RX ADMIN — SENNOSIDES AND DOCUSATE SODIUM 2 TABLET: 50; 8.6 TABLET ORAL at 17:43

## 2024-12-10 RX ADMIN — SERTRALINE HYDROCHLORIDE 150 MG: 100 TABLET ORAL at 08:40

## 2024-12-10 RX ADMIN — LEVOTHYROXINE SODIUM 37.5 MCG: 125 TABLET ORAL at 06:03

## 2024-12-10 RX ADMIN — ATORVASTATIN CALCIUM 10 MG: 10 TABLET, FILM COATED ORAL at 17:43

## 2024-12-10 RX ADMIN — FAMOTIDINE 20 MG: 20 TABLET ORAL at 17:43

## 2024-12-10 RX ADMIN — Medication 2500 UNITS: at 08:40

## 2024-12-10 NOTE — NURSING NOTE
Patient is visible on unit, isolative to self. Patient is calm and cooperative upon approach. Patient denies SI/HI/AH/VH. Patient is compliant with meds and meals.

## 2024-12-10 NOTE — NURSING NOTE
Patient remains withdrawn to self and is not visible much on the milieu during the evening hours. Patient denies all psych symptoms and does not report any unmet needs although appears to be depressed. He is compliant and pleasant on approach. Q 15 min rounding maintained for safety.

## 2024-12-10 NOTE — TREATMENT PLAN
TREATMENT PLAN REVIEW - Behavioral Health Franco Roberson 39 y.o. 1985 male MRN: [id number]    [facility] 3B BEHAVIORAL [facility] Room / Bed: [facility] 347/[facility] 347-02 Encounter: [id number]          Admit Date/Time:  5/14/2024  4:45 PM    Treatment Team:   MD Isaac Heard RN Tara Nickens Lisbeth Tatiana Case-Liang    Diagnosis: Principal Problem:    MDD (major depressive disorder), recurrent severe, without psychosis (HCC)  Active Problems:    Unspecified depressive disorder (HCC)    Medical clearance for psychiatric admission    Hyperlipidemia    Vitamin D deficiency    B12 deficiency    Hypothyroidism    Autism spectrum disorder      Patient Strengths/Assets: compliant with medication, patient is willing to work on problems    Patient Barriers/Limitations: lack of financial means, lack of stable employment, limited family ties, limited support system, unresourceful    Short Term Goals: decrease in depressive symptoms, improvement in self care    Long Term Goals: improvement in depression, adequate self care    Progress Towards Goals: continue psychiatric medications as prescribed, progressing, depression is improving, discharge planning, placement pending    Recommended Treatment: medication management, patient medication education, group therapy, milieu therapy, continued Behavioral Health psychiatric evaluation/assessment process    Treatment Frequency: daily medication monitoring, group and milieu therapy daily, monitoring through interdisciplinary rounds, monitoring through weekly patient care conferences    Expected Discharge Date:  Greater than 2 midnights    Discharge Plan: placement in personal care [facility] home, referrals as indicated    Treatment Plan Created/Updated By: Dustin Mcallister MD

## 2024-12-10 NOTE — ASSESSMENT & PLAN NOTE
"No medications changes at this time, optimize as indicated;  Patient continues to be pending group home placement. No clinical significant change noted today upon encounter.  Pt denied SSI and SSD, CM cont to work on personal care home placement and assisting pt w/ finances. Worked w/ SW to place money in [employer] account. Pending SSC meeting this month on 12/13.    Abilify 5 mg daily as mood adjunct   Zoloft 150 mg daily for depression and anxiety  Continue to encourage participation in group therapy, milieu therapy and occupational therapy.  Continue to assess for side effects of medications.  Continue collaboration with SLIM for medical co-morbidities as indicated.  Continue discussion with CM/SW to assist with obtaining collateral, disposition planning, and the implementation of patient-centered individualized plan of care.  Continue frequent safety checks and vitals per unit protocol.    Risks, benefits and possible side effects of Medications: Risks, benefits, and possible side effects of medications have previously been explained. No new medications at this time.      Legal status: 201    Disposition: to be determined, pending SOAR application for potential group home placement. OT Cognitive Evaluation completed: \"Pt would benefit from discharge to a supportive environment that can provide checks for safety and compliance with IADLs. \"   Although patient's mood has stabilized, they are currently awaiting group home placement.  Their ability to recognize safety hazards take medications and participate in IADLs are significantly impaired by their cognitive deficits compounded by their chronic mental health needs and would be at risk for significant decompensation without the structure and support of a group home setting.      No associated orders from this encounter found during lookback period of 72 hours.  "

## 2024-12-10 NOTE — PROGRESS NOTES
12/10/24 0839   Team Meeting   Meeting Type Daily Rounds   Team Members Present   Team Members Present Physician;Nurse;   Physician Team Member Ary   [facility]   Care Management Team Member Noa   Patient/Family Present   Patient Present No   Patient's Family Present No     Pt denies SI/HI/AVH. Pt is medication and meal compliant. Pt is calm and compliant. Pt is visible in groups and social with peers and staff. Pt's discharge is pending placement and SSA appointment scheduled for Friday.

## 2024-12-10 NOTE — PROGRESS NOTES
"Progress Note - Behavioral Health   Name: Franco Roberson 39 y.o. male I MRN: [id number]  Unit/Bed#: -02 I Date of Admission: 5/14/2024   Date of Service: 12/10/2024 I Hospital Day: 210     Assessment & Plan  MDD (major depressive disorder), recurrent severe, without psychosis (HCC)  No medications changes at this time, optimize as indicated;  Patient continues to be pending group home placement. No clinical significant change noted today upon encounter.  Pt denied SSI and SSD, CM cont to work on personal care home placement and assisting pt w/ finances. Worked w/ SW to place money in [employer] account. Pending SSC meeting this month on 12/13.    Abilify 5 mg daily as mood adjunct   Zoloft 150 mg daily for depression and anxiety  Continue to encourage participation in group therapy, milieu therapy and occupational therapy.  Continue to assess for side effects of medications.  Continue collaboration with PATRICIA for medical co-morbidities as indicated.  Continue discussion with CM/SW to assist with obtaining collateral, disposition planning, and the implementation of patient-centered individualized plan of care.  Continue frequent safety checks and vitals per unit protocol.    Risks, benefits and possible side effects of Medications: Risks, benefits, and possible side effects of medications have previously been explained. No new medications at this time.      Legal status: 201    Disposition: to be determined, pending SOAR application for potential group home placement. OT Cognitive Evaluation completed: \"Pt would benefit from discharge to a supportive environment that can provide checks for safety and compliance with IADLs. \"   Although patient's mood has stabilized, they are currently awaiting group home placement.  Their ability to recognize safety hazards take medications and participate in IADLs are significantly impaired by their cognitive deficits compounded by their chronic mental health needs and would be at risk " for significant decompensation without the structure and support of a group home setting.      No associated orders from this encounter found during lookback period of 72 hours.  Autism spectrum disorder  Continue supportive care    No associated orders from this encounter found during lookback period of 72 hours.        Plan     Recommended Treatment:    - Encourage early mobility and having a structured day  - Provide frequent re-orientation, and cognitive stimulation  - Ensure assistive devices are in proper working order (eye-glasses, hearing aids)  - Encourage adequate hydration, nutrition and monitor bowel movements  - Maintain sleep-wake cycle: Uninterrupted sleep time; low-level lighting at night  - Fall precaution  - f/u SLIM recs regarding the medical problems   - Continue medication titration and treatment plan; adjust medication to optimize treatment response and as clinically indicated. .   - Observation: routine            - VS: as per unit protocol  - Diet: Regular diet  - Encourage group attendance and milieu therapy  - Dispo: To be determined     Scheduled medications:  Current Facility-Administered Medications   Medication Dose Route Frequency Provider Last Rate    acetaminophen  650 mg Oral Q6H PRN Basilio Brown MD      acetaminophen  650 mg Oral Q4H PRN Basilio Brown MD      acetaminophen  975 mg Oral Q6H PRN Basilio Brown MD      aluminum-magnesium hydroxide-simethicone  30 mL Oral Q4H PRN Basilio Brown MD      ARIPiprazole  5 mg Oral Daily Basilio Panda MD      Artificial Tears  1 drop Both Eyes Q3H PRN Basilio Brown MD      atorvastatin  10 mg Oral Daily With Dinner WALLY Baptiste      benztropine  1 mg Intramuscular Q4H PRN Max 6/day Basilio Brown MD      benztropine  1 mg Oral Q4H PRN Max 6/day Basilio Brown MD      Cholecalciferol  2,500 Units Oral Daily Dustin Mcallister MD      cyanocobalamin  1,000 mcg Oral Daily Laura Clark  WALLY Ashford      Diclofenac Sodium  2 g Topical 4x Daily PRN WALLY Baptiste      hydrOXYzine HCL  50 mg Oral Q6H PRN Max 4/day Basilio Brown MD      Or    diphenhydrAMINE  50 mg Intramuscular Q6H PRN Basilio Brown MD      diphenhydrAMINE-zinc acetate   Topical Daily PRN Raj Guerrero MD      famotidine  20 mg Oral BID WALLY Baptiste      hydrOXYzine HCL  100 mg Oral Q6H PRN Max 4/day Basilio Brown MD      Or    LORazepam  2 mg Intramuscular Q6H PRN Basilio Brown MD      hydrOXYzine HCL  25 mg Oral Q6H PRN Max 4/day Basilio Brown MD      levothyroxine  37.5 mcg Oral Early Morning WALLY Baptiste      melatonin  6 mg Oral HS PRN WALLY Gomez      methocarbamol  500 mg Oral Q6H PRN WALLY Baptiste      OLANZapine  5 mg Oral Q4H PRN Max 3/day Basilio Brown MD      Or    OLANZapine  2.5 mg Intramuscular Q4H PRN Max 3/day Basilio Brown MD      OLANZapine  5 mg Oral Q3H PRN Max 3/day Basilio Brown MD      Or    OLANZapine  5 mg Intramuscular Q3H PRN Max 3/day Basilio Brown MD      OLANZapine  2.5 mg Oral Q4H PRN Max 6/day Basilio Brown MD      polyethylene glycol  17 g Oral Daily PRN Basilio Brown MD      propranolol  10 mg Oral Q8H PRN Basilio Brown MD      senna-docusate sodium  2 tablet Oral After Dinner Dustin Mcallister MD      sertraline  150 mg Oral Daily WALLY Gomez      traZODone  50 mg Oral HS PRN Basilio Brown MD        PRN:    acetaminophen    acetaminophen    acetaminophen    aluminum-magnesium hydroxide-simethicone    Artificial Tears    benztropine    benztropine    Diclofenac Sodium    hydrOXYzine HCL **OR** diphenhydrAMINE    diphenhydrAMINE-zinc acetate    hydrOXYzine HCL **OR** LORazepam    hydrOXYzine HCL    melatonin    methocarbamol    OLANZapine **OR** OLANZapine    OLANZapine **OR** OLANZapine    OLANZapine    polyethylene glycol     "propranolol    traZODone       Subjective     Patient was visited on unit for continuing care; chart reviewed and discussed with multidisciplinary treatment team.  On approach, the patient was calm and cooperative. Denied any changes in mood, appetite, and energy level.  Was informed by social work that his meeting will actually take place on the 13th of this month.  No problem initiating and maintaining sleep.  Denied A/VH currently.  Denied SI/HI, intent or plan upon direct inquiry at this time.    Patient continues to be visible in the milieu and interacts with staff and peers. No reports of aggression or self-injurious behavior on unit. No PRN medications used in the past 24 hours.    Patient accepted all offered medications and no adverse effects of medications noted or reported.    Objective    Current Mental Status Evaluation:  Appearance: casually dressed, consistent with stated age  Motor: no psychomotor retardation, no gait abnormalities  Behavior: cooperative, answers questions appropriately  Speech: soft, normal rhythm  Mood: \"good\"  Affect: blunted  Thought Process: linear and goal-oriented  Thought Content: denies delusions or paranoia  Risk Potential: denies suicidal ideation, plan, or intent. Denies homicidal ideation  Perceptions: denies auditory hallucinations, denies visual hallucinations,   Sensorium: Oriented to person, place, time, and situation  Cognition: cognitive ability appears intact but was not quantitatively tested  Consciousness: alert and awake  Attention: intact, able to focus without difficulty  Insight: limited  Judgement: limited          Vital signs in last 24 hours:    Temp:  [97.3 °F (36.3 °C)] 97.3 °F (36.3 °C)  HR:  [58-80] 58  BP: (110)/(63-70) 110/70  Resp:  [16] 16  SpO2:  [96 %] 96 %  O2 Device: None (Room air)    Psychiatric Review of Systems:  Medication adverse effects: none  Sleep: unchanged  Appetite: unchanged  Behaviors over the past 24 hours: " unchanged    Laboratory results:    I have personally reviewed all pertinent laboratory/tests results  No results found for this or any previous visit (from the past 48 hours).       Progress Toward Goals & Illness Status:   progressing, attends groups, discharge planning, placement pending    Patient is not at goal. They are not yet ready for discharge. The patient's condition currently requires active psychopharmacological medication management, interdisciplinary coordination with case management, and the utilization of adjunctive milieu and group therapy to augment psychopharmacological efficacy. The patient's risk of morbidity, and progression or decompensation of psychiatric disease, is higher without this current treatment.     Next of Kin  Extended Emergency Contact Information  Primary Emergency Contact: Lois Roberson  Address: [address] [place] [address] of [facility]  Home Phone: [phone]  Relation: Mother    Counseling / Coordination of Care  Patient's progress discussed with staff in treatment team meeting.  Medications, treatment progress and treatment plan reviewed with patient.  Medication education provided to patient.  Educated on importance of medication and treatment compliance.  Supportive therapy provided to patient.  Cognitive techniques utilized during the session.  Reassurance and supportive therapy provided.  Reoriented to reality and reassured.  Encouraged participation in milieu and group therapy on the unit.  Crisis/safety plan discussed with patient.       Dustin Mcallister MD  Attending Psychiatrist   [facility]      This note has been constructed using a voice recognition system. There may be translation, syntax, or grammatical errors. If you have any questions, please contact the dictating provider.

## 2024-12-10 NOTE — PLAN OF CARE
Problem: Ineffective Coping  Goal: Participates in unit activities  Description: Interventions:  - Provide therapeutic environment   - Provide required programming   - Redirect inappropriate behaviors   Outcome: Progressing     Problem: Depression  Goal: Treatment Goal: Demonstrate behavioral control of depressive symptoms, verbalize feelings of improved mood/affect, and adopt new coping skills prior to discharge  Outcome: Progressing  Goal: Verbalize thoughts and feelings  Description: Interventions:  - Assess and re-assess patient's level of risk   - Engage patient in 1:1 interactions, daily, for a minimum of 15 minutes   - Encourage patient to express feelings, fears, frustrations, hopes   Outcome: Progressing  Goal: Refrain from harming self  Description: Interventions:  - Monitor patient closely, per order   - Supervise medication ingestion, monitor effects and side effects   Outcome: Progressing  Goal: Refrain from isolation  Description: Interventions:  - Develop a trusting relationship   - Encourage socialization   Outcome: Progressing     Problem: Anxiety  Goal: Anxiety is at manageable level  Description: Interventions:  - Assess and monitor patient's anxiety level.   - Monitor for signs and symptoms (heart palpitations, chest pain, shortness of breath, headaches, nausea, feeling jumpy, restlessness, irritable, apprehensive).   - Collaborate with interdisciplinary team and initiate plan and interventions as ordered.  - [place] patient to unit/surroundings  - Explain treatment plan  - Encourage participation in care  - Encourage verbalization of concerns/fears  - Identify coping mechanisms  - Assist in developing anxiety-reducing skills  - Administer/offer alternative therapies  - Limit or eliminate stimulants  Outcome: Progressing     Problem: Knowledge Deficit  Goal: Patient/family/caregiver demonstrates understanding of disease process, treatment plan, medications, and discharge  instructions  Description: Complete learning assessment and assess knowledge base.  Interventions:  - Provide teaching at level of understanding  - Provide teaching via preferred learning methods  Outcome: Progressing     Problem: SLEEP DISTURBANCE  Goal: Will exhibit normal sleeping pattern  Description: Interventions:  -  Assess the patients sleep pattern, noting recent changes  - Administer medication as ordered  - Decrease environmental stimuli, including noise, as appropriate during the night  - Encourage the patient to actively participate in unit groups and or exercise during the day to enhance ability to achieve adequate sleep at night  - Assess the patient, in the morning, encouraging a description of sleep experience  Outcome: Progressing

## 2024-12-11 PROCEDURE — 99232 SBSQ HOSP IP/OBS MODERATE 35: CPT | Performed by: PSYCHIATRY & NEUROLOGY

## 2024-12-11 RX ADMIN — ATORVASTATIN CALCIUM 10 MG: 10 TABLET, FILM COATED ORAL at 17:20

## 2024-12-11 RX ADMIN — SENNOSIDES AND DOCUSATE SODIUM 2 TABLET: 50; 8.6 TABLET ORAL at 17:20

## 2024-12-11 RX ADMIN — FAMOTIDINE 20 MG: 20 TABLET ORAL at 08:19

## 2024-12-11 RX ADMIN — LEVOTHYROXINE SODIUM 37.5 MCG: 125 TABLET ORAL at 06:41

## 2024-12-11 RX ADMIN — Medication 2500 UNITS: at 08:19

## 2024-12-11 RX ADMIN — SERTRALINE HYDROCHLORIDE 150 MG: 100 TABLET ORAL at 08:19

## 2024-12-11 RX ADMIN — ARIPIPRAZOLE 5 MG: 5 TABLET ORAL at 08:19

## 2024-12-11 RX ADMIN — CYANOCOBALAMIN TAB 1000 MCG 1000 MCG: 1000 TAB at 08:19

## 2024-12-11 RX ADMIN — FAMOTIDINE 20 MG: 20 TABLET ORAL at 17:20

## 2024-12-11 NOTE — PROGRESS NOTES
12/11/24 0843   Team Meeting   Meeting Type Daily Rounds   Team Members Present   Team Members Present Physician;;Nurse   Physician Team Member Ary   Nursing Team Member [facility] Management Team Member Noa   Patient/Family Present   Patient Present No   Patient's Family Present No     Pt is medication and meal compliant. Pt denies SI/HI/AVH. Pt is calm and pleasant. Pt is observed walking the hallway and social with select peers and staff. Pt's discharge is pending placement.

## 2024-12-11 NOTE — PROGRESS NOTES
"Progress Note - Behavioral Health     Franco Roberson 39 y.o. male MRN: [id number]   Unit/Bed#: U 347-02 Encounter: [id number]  Assessment & Plan  MDD (major depressive disorder), recurrent severe, without psychosis (HCC)  Patient continues to be pending group home placement. Otherwise no clinical significant change.  Pt denied SSI and SSD, CM cont to work on personal care home placement and assisting pt w/ finances. Worked w/ SW to place money in [employer] account. Pending [facility] meeting this month on 12/13.    Abilify 5 mg daily as mood adjunct   Zoloft 150 mg daily for depression and anxiety  Continue to encourage participation in group therapy, milieu therapy and occupational therapy.  Continue to assess for side effects of medications.  Continue collaboration with SLIM for medical co-morbidities as indicated.  Continue discussion with CM/SW to assist with obtaining collateral, disposition planning, and the implementation of patient-centered individualized plan of care.  Continue frequent safety checks and vitals per unit protocol.    Risks, benefits and possible side effects of Medications: Risks, benefits, and possible side effects of medications have previously been explained. No new medications at this time.      Legal status: 201    Disposition: to be determined, pending SOAR application for potential group home placement. OT Cognitive Evaluation completed: \"Pt would benefit from discharge to a supportive environment that can provide checks for safety and compliance with IADLs. \"   Although patient's mood has stabilized, they are currently awaiting group home placement.  Their ability to recognize safety hazards take medications and participate in IADLs are significantly impaired by their cognitive deficits compounded by their chronic mental health needs and would be at risk for significant decompensation without the structure and support of a group home setting.      No associated orders from this encounter found " during lookback period of 72 hours.  Autism spectrum disorder  Continue supportive care    No associated orders from this encounter found during lookback period of 72 hours.         Recommended Treatment:     Planned medication and treatment changes:    All current active medications have been reviewed  Encourage group therapy, milieu therapy and occupational therapy  Behavioral Health checks for safety monitoring      Current medications:  Current Facility-Administered Medications   Medication Dose Route Frequency Provider Last Rate    acetaminophen  650 mg Oral Q6H PRN Basilio Brown MD      acetaminophen  650 mg Oral Q4H PRN Basilio Brown MD      acetaminophen  975 mg Oral Q6H PRN Basilio Brown MD      aluminum-magnesium hydroxide-simethicone  30 mL Oral Q4H PRN Basilio Brown MD      ARIPiprazole  5 mg Oral Daily Basilio Panda MD      Artificial Tears  1 drop Both Eyes Q3H PRN Basilio Brown MD      atorvastatin  10 mg Oral Daily With Dinner WALLY Baptiste      benztropine  1 mg Intramuscular Q4H PRN Max 6/day Basilio Brown MD      benztropine  1 mg Oral Q4H PRN Max 6/day Basilio Brown MD      Cholecalciferol  2,500 Units Oral Daily Dustin Mcallister MD      cyanocobalamin  1,000 mcg Oral Daily WALLY Baptiste      Diclofenac Sodium  2 g Topical 4x Daily PRN WALLY Baptiste      hydrOXYzine HCL  50 mg Oral Q6H PRN Max 4/day Basilio Brown MD      Or    diphenhydrAMINE  50 mg Intramuscular Q6H PRN Basilio Brown MD      diphenhydrAMINE-zinc acetate   Topical Daily PRN Raj Guerrero MD      famotidine  20 mg Oral BID WALLY Baptiste      hydrOXYzine HCL  100 mg Oral Q6H PRN Max 4/day Basilio Brown MD      Or    LORazepam  2 mg Intramuscular Q6H PRN Basilio Brown MD      hydrOXYzine HCL  25 mg Oral Q6H PRN Max 4/day Basilio Brown MD      levothyroxine  37.5 mcg Oral Early Morning Laura  "WALLY Olmos      melatonin  6 mg Oral HS PRN WALLY Gomez      methocarbamol  500 mg Oral Q6H PRN Laura WALLY Olmos      OLANZapine  5 mg Oral Q4H PRN Max 3/day Basilio Brown MD      Or    OLANZapine  2.5 mg Intramuscular Q4H PRN Max 3/day Basilio Brown MD      OLANZapine  5 mg Oral Q3H PRN Max 3/day Basilio Brown MD      Or    OLANZapine  5 mg Intramuscular Q3H PRN Max 3/day Basilio Brown MD      OLANZapine  2.5 mg Oral Q4H PRN Max 6/day Basilio Brown MD      polyethylene glycol  17 g Oral Daily PRN Basilio Brown MD      propranolol  10 mg Oral Q8H PRN Basilio Brown MD      senna-docusate sodium  2 tablet Oral After Dinner Dustin Mcallister MD      sertraline  150 mg Oral Daily WALLY Gomez      traZODone  50 mg Oral HS PRN Basilio Brown MD         Risks / Benefits of Treatment:    Risks, benefits, and possible side effects of medications explained to patient and patient verbalizes understanding and agreement for treatment.    Subjective:    Behavior over the last 24 hours: unchanged.     Franco was seen today in follow-up for continuation of care. Per staff, no acute events reported overnight. Franco is seen resting in bed comfortably and is willing to participate with interview today. He states he is, \"okay\" without any new complaints. States he is looking forward to his meeting and is happy that there is some movement now with potential placement. He denies any change in depression/anxiety symptoms. He agrees that he needs to continue to await group home as he is unable to care for self appropriately without structure. Franco adamantly denies suicidal/homicidal ideation in addition to thoughts of self-injury. Franco presently is contacting for safety on the unit and feels comfortable to confide in staff if thoughts arise. At time of interview, no overt psychosis elicited. Franco has been complaint with medications and " "tolerating without any side effects.     Sleep: normal  Appetite: normal  Medication side effects: No   ROS: no complaints    Mental Status Evaluation:    Appearance:  casually dressed, laying in bed, marginal hygiene, wearing regular attire, appears stated age   Behavior:  pleasant, calm, brief   Speech:  normal rate and volume   Mood:  euthymic, \"good\"   Affect:  constricted   Thought Process:  goal directed, linear   Associations: intact associations   Thought Content:  no overt delusions   Perceptual Disturbances: no auditory hallucinations, no visual hallucinations   Risk Potential: Suicidal ideation - None at present  Homicidal ideation - None at present  Potential for aggression - No   Sensorium:  oriented to person, place, and time/date   Memory:  recent and remote memory grossly intact   Consciousness:  alert   Attention/Concentration: attention span and concentration are age appropriate   Insight:  fair   Judgment: fair   Gait/Station: normal gait/station   Motor Activity: no abnormal movements     Vital signs in last 24 hours:    Temp:  [96.8 °F (36 °C)-97.1 °F (36.2 °C)] 97.1 °F (36.2 °C)  HR:  [67-76] 67  BP: (108-115)/(61-70) 115/70  Resp:  [16] 16  SpO2:  [96 %-97 %] 96 %  O2 Device: None (Room air)    Laboratory results: I have personally reviewed all pertinent laboratory/tests results    Results from the past 24 hours: No results found for this or any previous visit (from the past 24 hours).    Suicide/Homicide Risk Assessment:    Risk of Harm to Self:   Nursing Suicide Risk Assessment Last 24 hours: C-SSRS Risk (Since Last Contact)  Calculated C-SSRS Risk Score (Since Last Contact): No Risk Indicated    Risk of Harm to Others:  Nursing Homicide Risk Assessment: Violence Risk to Others: Denies within past 6 months    The following interventions are recommended: Behavioral Health checks for safety monitoring, continued hospitalization on locked unit    Progress Toward Goals: progressing    Counseling " / Coordination of Care:    Total floor / unit time spent today 25 minutes. Greater than 50% of total time was spent with the patient and / or family counseling and / or coordination of care. A description of counseling / coordination of care:    Elizabeth Pennington PA-C 12/11/24

## 2024-12-11 NOTE — NURSING NOTE
Patient is calm and cooperative. Remains isolative to his room and self. Visible for meals and needs. Medication and meal compliant. Denies SI/HI/AVH and pain . Q 15 min safety checks maintained.

## 2024-12-11 NOTE — NURSING NOTE
Pt mainly withdrawn/isolative to room. Visible for needs and occasional socialization. Denies SI/HI/AVH. Denies needs or concerns. Compliant with unit routines and care. Safety checks ongoing.

## 2024-12-11 NOTE — PLAN OF CARE
Problem: Depression  Goal: Treatment Goal: Demonstrate behavioral control of depressive symptoms, verbalize feelings of improved mood/affect, and adopt new coping skills prior to discharge  Outcome: Progressing  Goal: Verbalize thoughts and feelings  Description: Interventions:  - Assess and re-assess patient's level of risk   - Engage patient in 1:1 interactions, daily, for a minimum of 15 minutes   - Encourage patient to express feelings, fears, frustrations, hopes   Outcome: Progressing  Goal: Refrain from harming self  Description: Interventions:  - Monitor patient closely, per order   - Supervise medication ingestion, monitor effects and side effects   Outcome: Progressing  Goal: Refrain from isolation  Description: Interventions:  - Develop a trusting relationship   - Encourage socialization   Outcome: Progressing     Problem: Anxiety  Goal: Anxiety is at manageable level  Description: Interventions:  - Assess and monitor patient's anxiety level.   - Monitor for signs and symptoms (heart palpitations, chest pain, shortness of breath, headaches, nausea, feeling jumpy, restlessness, irritable, apprehensive).   - Collaborate with interdisciplinary team and initiate plan and interventions as ordered.  - [place] patient to unit/surroundings  - Explain treatment plan  - Encourage participation in care  - Encourage verbalization of concerns/fears  - Identify coping mechanisms  - Assist in developing anxiety-reducing skills  - Administer/offer alternative therapies  - Limit or eliminate stimulants  Outcome: Progressing     Problem: DISCHARGE PLANNING - CARE MANAGEMENT  Goal: Discharge to post-acute care or home with appropriate resources  Description: INTERVENTIONS:  - Conduct assessment to determine patient/family and health care team treatment goals, and need for post-acute services based on payer coverage, community resources, and patient preferences, and barriers to discharge  - Address psychosocial, clinical,  and financial barriers to discharge as identified in assessment in conjunction with the patient/family and health care team  - Arrange appropriate level of post-acute services according to patient’s   needs and preference and payer coverage in collaboration with the physician and health care team  - Communicate with and update the patient/family, physician, and health care team regarding progress on the discharge plan  - Arrange appropriate transportation to post-acute venues  Outcome: Progressing     Problem: Knowledge Deficit  Goal: Patient/family/caregiver demonstrates understanding of disease process, treatment plan, medications, and discharge instructions  Description: Complete learning assessment and assess knowledge base.  Interventions:  - Provide teaching at level of understanding  - Provide teaching via preferred learning methods  Outcome: Progressing     Problem: SLEEP DISTURBANCE  Goal: Will exhibit normal sleeping pattern  Description: Interventions:  -  Assess the patients sleep pattern, noting recent changes  - Administer medication as ordered  - Decrease environmental stimuli, including noise, as appropriate during the night  - Encourage the patient to actively participate in unit groups and or exercise during the day to enhance ability to achieve adequate sleep at night  - Assess the patient, in the morning, encouraging a description of sleep experience  Outcome: Progressing

## 2024-12-11 NOTE — ASSESSMENT & PLAN NOTE
"Patient continues to be pending group home placement. Otherwise no clinical significant change.  Pt denied SSI and SSD, CM cont to work on personal care home placement and assisting pt w/ finances. Worked w/ SW to place money in [employer] account. Pending [facility] meeting this month on 12/13.    Abilify 5 mg daily as mood adjunct   Zoloft 150 mg daily for depression and anxiety  Continue to encourage participation in group therapy, milieu therapy and occupational therapy.  Continue to assess for side effects of medications.  Continue collaboration with PATRICIA for medical co-morbidities as indicated.  Continue discussion with CM/SW to assist with obtaining collateral, disposition planning, and the implementation of patient-centered individualized plan of care.  Continue frequent safety checks and vitals per unit protocol.    Risks, benefits and possible side effects of Medications: Risks, benefits, and possible side effects of medications have previously been explained. No new medications at this time.      Legal status: 201    Disposition: to be determined, pending SOAR application for potential group home placement. OT Cognitive Evaluation completed: \"Pt would benefit from discharge to a supportive environment that can provide checks for safety and compliance with IADLs. \"   Although patient's mood has stabilized, they are currently awaiting group home placement.  Their ability to recognize safety hazards take medications and participate in IADLs are significantly impaired by their cognitive deficits compounded by their chronic mental health needs and would be at risk for significant decompensation without the structure and support of a group home setting.      No associated orders from this encounter found during lookback period of 72 hours.  "

## 2024-12-12 PROCEDURE — 99232 SBSQ HOSP IP/OBS MODERATE 35: CPT | Performed by: PSYCHIATRY & NEUROLOGY

## 2024-12-12 RX ADMIN — SENNOSIDES AND DOCUSATE SODIUM 2 TABLET: 50; 8.6 TABLET ORAL at 17:10

## 2024-12-12 RX ADMIN — SERTRALINE HYDROCHLORIDE 150 MG: 100 TABLET ORAL at 08:07

## 2024-12-12 RX ADMIN — CYANOCOBALAMIN TAB 1000 MCG 1000 MCG: 1000 TAB at 08:07

## 2024-12-12 RX ADMIN — Medication 2500 UNITS: at 08:06

## 2024-12-12 RX ADMIN — FAMOTIDINE 20 MG: 20 TABLET ORAL at 17:10

## 2024-12-12 RX ADMIN — ARIPIPRAZOLE 5 MG: 5 TABLET ORAL at 08:07

## 2024-12-12 RX ADMIN — FAMOTIDINE 20 MG: 20 TABLET ORAL at 08:07

## 2024-12-12 RX ADMIN — LEVOTHYROXINE SODIUM 37.5 MCG: 125 TABLET ORAL at 06:02

## 2024-12-12 RX ADMIN — ATORVASTATIN CALCIUM 10 MG: 10 TABLET, FILM COATED ORAL at 17:10

## 2024-12-12 NOTE — NURSING NOTE
Pt denies SI/HI/AH/VH. Present in dayroom and milieu. Medication and meal compliant. Social with select peers. Pt attends groups. Compliant with Lunch. Scant with communication. No further concerns as of present. Plan of care ongoing.

## 2024-12-12 NOTE — NURSING NOTE
Patient calm and cooperative upon approach. Patient is visible on unit, socializing with peers. Patient denies SI/HI/AH/VH. Patient compliant with meds and meals.

## 2024-12-12 NOTE — ASSESSMENT & PLAN NOTE
"Patient continues to be pending group home placement. Otherwise no clinical significant change.  Pt denied SSI and SSD, CM cont to work on personal care home placement and assisting pt w/ finances. Worked w/ SW to place money in [employer] account. Pending [facility] [place] [place] meeting this month on 12/13.    Abilify 5 mg daily as mood adjunct   Zoloft 150 mg daily for depression and anxiety  Continue to encourage participation in group therapy, milieu therapy and occupational therapy.  Continue to assess for side effects of medications.  Continue collaboration with PATRICIA for medical co-morbidities as indicated.  Continue discussion with CM/SW to assist with obtaining collateral, disposition planning, and the implementation of patient-centered individualized plan of care.  Continue frequent safety checks and vitals per unit protocol.    Risks, benefits and possible side effects of Medications: Risks, benefits, and possible side effects of medications have previously been explained. No new medications at this time.      Legal status: 201    Disposition: to be determined, pending SOAR application for potential group home placement. OT Cognitive Evaluation completed: \"Pt would benefit from discharge to a supportive environment that can provide checks for safety and compliance with IADLs. \"   Although patient's mood has stabilized, they are currently awaiting group home placement.  Their ability to recognize safety hazards take medications and participate in IADLs are significantly impaired by their cognitive deficits compounded by their chronic mental health needs and would be at risk for significant decompensation without the structure and support of a group home setting.      No associated orders from this encounter found during lookback period of 72 hours.  "

## 2024-12-12 NOTE — PLAN OF CARE
Problem: Ineffective Coping  Goal: Participates in unit activities  Description: Interventions:  - Provide therapeutic environment   - Provide required programming   - Redirect inappropriate behaviors   Outcome: Progressing     Problem: Depression  Goal: Treatment Goal: Demonstrate behavioral control of depressive symptoms, verbalize feelings of improved mood/affect, and adopt new coping skills prior to discharge  Outcome: Progressing  Goal: Verbalize thoughts and feelings  Description: Interventions:  - Assess and re-assess patient's level of risk   - Engage patient in 1:1 interactions, daily, for a minimum of 15 minutes   - Encourage patient to express feelings, fears, frustrations, hopes   Outcome: Progressing  Goal: Refrain from harming self  Description: Interventions:  - Monitor patient closely, per order   - Supervise medication ingestion, monitor effects and side effects   Outcome: Progressing  Goal: Refrain from isolation  Description: Interventions:  - Develop a trusting relationship   - Encourage socialization   Outcome: Progressing     Problem: Anxiety  Goal: Anxiety is at manageable level  Description: Interventions:  - Assess and monitor patient's anxiety level.   - Monitor for signs and symptoms (heart palpitations, chest pain, shortness of breath, headaches, nausea, feeling jumpy, restlessness, irritable, apprehensive).   - Collaborate with interdisciplinary team and initiate plan and interventions as ordered.  - [place] patient to unit/surroundings  - Explain treatment plan  - Encourage participation in care  - Encourage verbalization of concerns/fears  - Identify coping mechanisms  - Assist in developing anxiety-reducing skills  - Administer/offer alternative therapies  - Limit or eliminate stimulants  Outcome: Progressing     Problem: DISCHARGE PLANNING - CARE MANAGEMENT  Goal: Discharge to post-acute care or home with appropriate resources  Description: INTERVENTIONS:  - Conduct assessment to  determine patient/family and health care team treatment goals, and need for post-acute services based on payer coverage, community resources, and patient preferences, and barriers to discharge  - Address psychosocial, clinical, and financial barriers to discharge as identified in assessment in conjunction with the patient/family and health care team  - Arrange appropriate level of post-acute services according to patient’s   needs and preference and payer coverage in collaboration with the physician and health care team  - Communicate with and update the patient/family, physician, and health care team regarding progress on the discharge plan  - Arrange appropriate transportation to post-acute venues  Outcome: Progressing     Problem: Knowledge Deficit  Goal: Patient/family/caregiver demonstrates understanding of disease process, treatment plan, medications, and discharge instructions  Description: Complete learning assessment and assess knowledge base.  Interventions:  - Provide teaching at level of understanding  - Provide teaching via preferred learning methods  Outcome: Progressing     Problem: SLEEP DISTURBANCE  Goal: Will exhibit normal sleeping pattern  Description: Interventions:  -  Assess the patients sleep pattern, noting recent changes  - Administer medication as ordered  - Decrease environmental stimuli, including noise, as appropriate during the night  - Encourage the patient to actively participate in unit groups and or exercise during the day to enhance ability to achieve adequate sleep at night  - Assess the patient, in the morning, encouraging a description of sleep experience  Outcome: Progressing

## 2024-12-12 NOTE — PROGRESS NOTES
"Progress Note - Behavioral Health   Name: Franco Roberson 39 y.o. male I MRN: [id number]  Unit/Bed#: -02 I Date of Admission: 5/14/2024   Date of Service: 12/12/2024 I Hospital Day: 212     Assessment & Plan  MDD (major depressive disorder), recurrent severe, without psychosis (HCC)  Patient continues to be pending group home placement. Otherwise no clinical significant change.  Pt denied SSI and SSD, CM cont to work on personal care home placement and assisting pt w/ finances. Worked w/ SW to place money in ABLE account. Pending SSC meeting this month on 12/13.    Abilify 5 mg daily as mood adjunct   Zoloft 150 mg daily for depression and anxiety  Continue to encourage participation in group therapy, milieu therapy and occupational therapy.  Continue to assess for side effects of medications.  Continue collaboration with [facility]IM for medical co-morbidities as indicated.  Continue discussion with CM/SW to assist with obtaining collateral, disposition planning, and the implementation of patient-centered individualized plan of care.  Continue frequent safety checks and vitals per unit protocol.    Risks, benefits and possible side effects of Medications: Risks, benefits, and possible side effects of medications have previously been explained. No new medications at this time.      Legal status: 201    Disposition: to be determined, pending SOAR application for potential group home placement. OT Cognitive Evaluation completed: \"Pt would benefit from discharge to a supportive environment that can provide checks for safety and compliance with IADLs. \"   Although patient's mood has stabilized, they are currently awaiting group home placement.  Their ability to recognize safety hazards take medications and participate in IADLs are significantly impaired by their cognitive deficits compounded by their chronic mental health needs and would be at risk for significant decompensation without the structure and support of a group " home setting.      No associated orders from this encounter found during lookback period of 72 hours.  Autism spectrum disorder  Continue supportive care    No associated orders from this encounter found during lookback period of 72 hours.        Plan     Recommended Treatment:    - Encourage early mobility and having a structured day  - Provide frequent re-orientation, and cognitive stimulation  - Ensure assistive devices are in proper working order (eye-glasses, hearing aids)  - Encourage adequate hydration, nutrition and monitor bowel movements  - Maintain sleep-wake cycle: Uninterrupted sleep time; low-level lighting at night  - Fall precaution  - f/u SLIM recs regarding the medical problems   - Continue medication titration and treatment plan; adjust medication to optimize treatment response and as clinically indicated. .   - Observation: routine            - VS: as per unit protocol  - Diet: Regular diet  - Encourage group attendance and milieu therapy  - Dispo: To be determined     Scheduled medications:  Current Facility-Administered Medications   Medication Dose Route Frequency Provider Last Rate    acetaminophen  650 mg Oral Q6H PRN Basilio Brown MD      acetaminophen  650 mg Oral Q4H PRN Basilio Brown MD      acetaminophen  975 mg Oral Q6H PRN Basilio Brown MD      aluminum-magnesium hydroxide-simethicone  30 mL Oral Q4H PRN Basilio Brown MD      ARIPiprazole  5 mg Oral Daily Basilio Panda MD      Artificial Tears  1 drop Both Eyes Q3H PRN Basilio Brown MD      atorvastatin  10 mg Oral Daily With Dinner WALLY Baptiste      benztropine  1 mg Intramuscular Q4H PRN Max 6/day Basilio Brown MD      benztropine  1 mg Oral Q4H PRN Max 6/day Basilio Brown MD      Cholecalciferol  2,500 Units Oral Daily Dustin Mcallister MD      cyanocobalamin  1,000 mcg Oral Daily WALLY Baptiste      Diclofenac Sodium  2 g Topical 4x Daily PRN Laura  WALLY Olmos      hydrOXYzine HCL  50 mg Oral Q6H PRN Max 4/day Basilio Brown MD      Or    diphenhydrAMINE  50 mg Intramuscular Q6H PRN Basilio Brown MD      diphenhydrAMINE-zinc acetate   Topical Daily PRN Raj Guerrero MD      famotidine  20 mg Oral BID WALLY Baptiste      hydrOXYzine HCL  100 mg Oral Q6H PRN Max 4/day Basilio Brown MD      Or    LORazepam  2 mg Intramuscular Q6H PRN Basilio Brown MD      hydrOXYzine HCL  25 mg Oral Q6H PRN Max 4/day Basilio Brown MD      levothyroxine  37.5 mcg Oral Early Morning WALLY Baptiste      melatonin  6 mg Oral HS PRN WALLY Gomez      methocarbamol  500 mg Oral Q6H PRN WALLY Baptiste      OLANZapine  5 mg Oral Q4H PRN Max 3/day Basilio Brown MD      Or    OLANZapine  2.5 mg Intramuscular Q4H PRN Max 3/day Basilio Brown MD      OLANZapine  5 mg Oral Q3H PRN Max 3/day Basilio Brown MD      Or    OLANZapine  5 mg Intramuscular Q3H PRN Max 3/day Basilio Brown MD      OLANZapine  2.5 mg Oral Q4H PRN Max 6/day Basilio Brown MD      polyethylene glycol  17 g Oral Daily PRN Basilio Brown MD      propranolol  10 mg Oral Q8H PRN Basilio Brown MD      senna-docusate sodium  2 tablet Oral After Dinner Dustin Mcallister MD      sertraline  150 mg Oral Daily WALLY Gomez      traZODone  50 mg Oral HS PRN Basilio Brown MD        PRN:    acetaminophen    acetaminophen    acetaminophen    aluminum-magnesium hydroxide-simethicone    Artificial Tears    benztropine    benztropine    Diclofenac Sodium    hydrOXYzine HCL **OR** diphenhydrAMINE    diphenhydrAMINE-zinc acetate    hydrOXYzine HCL **OR** LORazepam    hydrOXYzine HCL    melatonin    methocarbamol    OLANZapine **OR** OLANZapine    OLANZapine **OR** OLANZapine    OLANZapine    polyethylene glycol    propranolol    traZODone       Subjective     Patient was visited on unit for  "continuing care; chart reviewed and discussed with multidisciplinary treatment team.  On approach, the patient was calm and cooperative. Denied any changes in mood, appetite, and energy level.  Patient is aware that his meeting with Social Security is tomorrow and that social work would help facilitate this.  His only question to me is to see if social work can touch base with him about the status of his ABLE to count.  Otherwise he has no acute complaints or concerns No problem initiating and maintaining sleep.  Denied A/VH currently.  Denied SI/HI, intent or plan upon direct inquiry at this time.    Patient continues to be visible in the milieu and interacts with staff and peers. No reports of aggression or self-injurious behavior on unit. No PRN medications used in the past 24 hours.    Patient accepted all offered medications and no adverse effects of medications noted or reported.    Objective    Current Mental Status Evaluation:  Mental Status Exam  Appearance: casually dressed, consistent with stated age  Motor: no psychomotor retardation, no gait abnormalities  Behavior: cooperative, answers questions appropriately  Speech: soft, normal rhythm  Mood: \"good\"  Affect: euthymic, blunted  Thought Process: linear and goal-oriented  Thought Content: denies delusions or paranoia  Risk Potential: denies suicidal ideation, plan, or intent. Denies homicidal ideation  Perceptions: denies auditory hallucinations, denies visual hallucinations,   Sensorium: Oriented to person, place, time, and situation  Cognition: cognitive ability appears intact but was not quantitatively tested  Consciousness: alert and awake  Attention: intact, able to focus without difficulty  Insight: limited  Judgement: limited            Vital signs in last 24 hours:    Temp:  [97.1 °F (36.2 °C)-97.2 °F (36.2 °C)] 97.1 °F (36.2 °C)  HR:  [67-68] 67  BP: (105-120)/(66-76) 120/76  Resp:  [16] 16  SpO2:  [95 %-99 %] 95 %  O2 Device: None (Room " air)    Psychiatric Review of Systems:  Medication adverse effects: none  Sleep: unchanged  Appetite: unchanged  Behaviors over the past 24 hours: unchanged    Laboratory results:    I have personally reviewed all pertinent laboratory/tests results  No results found for this or any previous visit (from the past 48 hours).       Progress Toward Goals & Illness Status:   progressing, participates in milieu therapy, mood is stabilizing, discharge planning, placement pending    Patient is not at goal. They are not yet ready for discharge. The patient's condition currently requires active psychopharmacological medication management, interdisciplinary coordination with case management, and the utilization of adjunctive milieu and group therapy to augment psychopharmacological efficacy. The patient's risk of morbidity, and progression or decompensation of psychiatric disease, is higher without this current treatment.     Next of Kin  Extended Emergency Contact Information  Primary Emergency Contact: Lois Roberson  Address: [address] [place] [address] [place]           [address]  Home Phone: [phone]  Relation: Mother    Counseling / Coordination of Care  Patient's progress discussed with staff in treatment team meeting.  Medications, treatment progress and treatment plan reviewed with patient.  Medication education provided to patient.  Educated on importance of medication and treatment compliance.  Supportive therapy provided to patient.  Cognitive techniques utilized during the session.  Reassurance and supportive therapy provided.  Reoriented to reality and reassured.  Encouraged participation in milieu and group therapy on the unit.  Crisis/safety plan discussed with patient.       Dustin Mcallister MD  Attending Psychiatrist   [facility]      This note has been constructed using a voice recognition system. There may be translation, syntax, or grammatical errors.  If you have any questions, please contact the dictating provider.

## 2024-12-12 NOTE — PROGRESS NOTES
12/12/24 1033   Team Meeting   Meeting Type Daily Rounds   Team Members Present   Team Members Present Physician;;Nurse   Physician Team Member Ary   Nursing Team Member [facility] Management Team Member Noa   Patient/Family Present   Patient Present No   Patient's Family Present No     Pt is medication and meal compliant. Pt denies SI/HI/AVH. Pt is calm and pleasant. Pt's discharge is pending placement.

## 2024-12-13 PROCEDURE — 99232 SBSQ HOSP IP/OBS MODERATE 35: CPT

## 2024-12-13 RX ADMIN — CYANOCOBALAMIN TAB 1000 MCG 1000 MCG: 1000 TAB at 08:15

## 2024-12-13 RX ADMIN — ATORVASTATIN CALCIUM 10 MG: 10 TABLET, FILM COATED ORAL at 17:29

## 2024-12-13 RX ADMIN — FAMOTIDINE 20 MG: 20 TABLET ORAL at 17:29

## 2024-12-13 RX ADMIN — SERTRALINE HYDROCHLORIDE 150 MG: 100 TABLET ORAL at 08:15

## 2024-12-13 RX ADMIN — SENNOSIDES AND DOCUSATE SODIUM 2 TABLET: 50; 8.6 TABLET ORAL at 17:29

## 2024-12-13 RX ADMIN — Medication 2500 UNITS: at 08:15

## 2024-12-13 RX ADMIN — ARIPIPRAZOLE 5 MG: 5 TABLET ORAL at 08:15

## 2024-12-13 RX ADMIN — LEVOTHYROXINE SODIUM 37.5 MCG: 125 TABLET ORAL at 06:51

## 2024-12-13 RX ADMIN — FAMOTIDINE 20 MG: 20 TABLET ORAL at 08:15

## 2024-12-13 NOTE — ASSESSMENT & PLAN NOTE
"Patient continues to be pending group home placement. Otherwise no clinical significant change.  Pt denied SSI and SSD, CM cont to work on personal care home placement and assisting pt w/ finances. Worked w/ SW to place money in ABLE account. SSC meeting today.    Abilify 5 mg daily as mood adjunct   Zoloft 150 mg daily for depression and anxiety  Continue to encourage participation in group therapy, milieu therapy and occupational therapy.  Continue to assess for side effects of medications.  Continue collaboration with [facility] for medical co-morbidities as indicated.  Continue discussion with CM/SW to assist with obtaining collateral, disposition planning, and the implementation of patient-centered individualized plan of care.  Continue frequent safety checks and vitals per unit protocol.    Risks, benefits and possible side effects of Medications: Risks, benefits, and possible side effects of medications have previously been explained. No new medications at this time.      Legal status: 201    Disposition: to be determined, pending SOAR application for potential group home placement. OT Cognitive Evaluation completed: \"Pt would benefit from discharge to a supportive environment that can provide checks for safety and compliance with IADLs. \"   Although patient's mood has stabilized, they are currently awaiting group home placement.  Their ability to recognize safety hazards take medications and participate in IADLs are significantly impaired by their cognitive deficits compounded by their chronic mental health needs and would be at risk for significant decompensation without the structure and support of a group home setting.      No associated orders from this encounter found during lookback period of 72 hours.  "

## 2024-12-13 NOTE — PLAN OF CARE
Pt attends some groups, typically quiet sitting with the group, but does participate appropriately when prompted.

## 2024-12-13 NOTE — PROGRESS NOTES
12/13/24 0923   Team Meeting   Meeting Type Daily Rounds   Team Members Present   Team Members Present Physician;Nurse;   Physician Team Member Amarilys   Nursing Team Member [facility] Management Team Member Noa   Patient/Family Present   Patient Present No   Patient's Family Present No     Pt denied SI/HI/AVH. Pt is medication and meal compliant. Pt is calm and compliant. Pt participated in SSA interview today at 9am . Pt's discharge is pending placement.

## 2024-12-13 NOTE — NURSING NOTE
Pt denies SI/HI/AH/VH. Pt reports anxiety regarding meeting this morning. Pt did not want a PRN at this time. Medication and meal compliant. Present in dayroom and milieu. Social with select peers. Compliant with Lunch. No further concerns as of present. Plan of care ongoing.

## 2024-12-13 NOTE — SOCIAL WORK
Pt completed SSA interview. They provided with feedback for several things that needed to be provided and clarified.   Pt understood.   Cm and Pt went onto [employer] account together.       Cm called SSA and clarified that Pt's money was sent to the [employer] account and asked about what proof they were requesting regarding the A[employer] account.    Cm faxed proof of able account and pending balance of 18,000 dollars.

## 2024-12-13 NOTE — PROGRESS NOTES
"Progress Note - Behavioral Health   Name: Franco Roberson 39 y.o. male I MRN: [id number]   Unit/Bed#: -02 I Date of Admission: 5/14/2024   Date of Service: 12/13/2024 I Hospital Day: 213         Assessment & Plan  MDD (major depressive disorder), recurrent severe, without psychosis (HCC)  Patient continues to be pending group home placement. Otherwise no clinical significant change.  Pt denied SSI and SSD, CM cont to work on personal care home placement and assisting pt w/ finances. Worked w/ SW to place money in ABLE account. SSC meeting today.    Abilify 5 mg daily as mood adjunct   Zoloft 150 mg daily for depression and anxiety  Continue to encourage participation in group therapy, milieu therapy and occupational therapy.  Continue to assess for side effects of medications.  Continue collaboration with [facility]IM for medical co-morbidities as indicated.  Continue discussion with CM/SW to assist with obtaining collateral, disposition planning, and the implementation of patient-centered individualized plan of care.  Continue frequent safety checks and vitals per unit protocol.    Risks, benefits and possible side effects of Medications: Risks, benefits, and possible side effects of medications have previously been explained. No new medications at this time.      Legal status: 201    Disposition: to be determined, pending SOAR application for potential group home placement. OT Cognitive Evaluation completed: \"Pt would benefit from discharge to a supportive environment that can provide checks for safety and compliance with IADLs. \"   Although patient's mood has stabilized, they are currently awaiting group home placement.  Their ability to recognize safety hazards take medications and participate in IADLs are significantly impaired by their cognitive deficits compounded by their chronic mental health needs and would be at risk for significant decompensation without the structure and support of a group home " setting.      No associated orders from this encounter found during lookback period of 72 hours.  Autism spectrum disorder  Continue supportive care    No associated orders from this encounter found during lookback period of 72 hours.          Recommended Treatment    Treatment plan and medication changes discussed and per the attending physician the plan is:     1.Continue with group therapy, milieu therapy and occupational therapy  2.Behavioral Health checks every 15 minutes  3.Continue frequent safety checks and vitals per unit protocol  4.Continue with SLIM medical management as indicated  5.Continue with current medication regimen  6.Will review labs in the a.m.  7.Disposition Planning: Discharge planning and efforts remain ongoing     Planned medication and treatment changes:    All current active medications have been reviewed  Encourage group therapy, milieu therapy and occupational therapy  Behavioral Health checks for safety monitoring  Continue treatment with group therapy, milieu therapy and occupational therapy  Discharge planning  This patient is stable and ready for discharge, however, even at baseline, patient continues with poor insight, judgement and coping that pose a risk to patient in a homeless shelter, or otherwise unsupervised setting. Case management is assertively/actively working on securing the necessary level of care.  Continue current medications:    Current medications:  Current Facility-Administered Medications   Medication Dose Route Frequency Provider Last Rate    acetaminophen  650 mg Oral Q6H PRN Basilio Brown MD      acetaminophen  650 mg Oral Q4H PRN Basilio Brown MD      acetaminophen  975 mg Oral Q6H PRN Basilio Brown MD      aluminum-magnesium hydroxide-simethicone  30 mL Oral Q4H PRN Basilio Brown MD      ARIPiprazole  5 mg Oral Daily Basilio Panda MD      Artificial Tears  1 drop Both Eyes Q3H PRN Basilio Brown MD      atorvastatin  10  mg Oral Daily With Dinner WALLY Baptiste      benztropine  1 mg Intramuscular Q4H PRN Max 6/day Basilio Brown MD      benztropine  1 mg Oral Q4H PRN Max 6/day Basilio Brown MD      Cholecalciferol  2,500 Units Oral Daily Dustin Mcallister MD      cyanocobalamin  1,000 mcg Oral Daily Laura RomeWALLY Jacobson      Diclofenac Sodium  2 g Topical 4x Daily PRN LauraWALLY Tony      hydrOXYzine HCL  50 mg Oral Q6H PRN Max 4/day Basilio Brown MD      Or    diphenhydrAMINE  50 mg Intramuscular Q6H PRN Basilio Brown MD      diphenhydrAMINE-zinc acetate   Topical Daily PRN Raj Guerrero MD      famotidine  20 mg Oral BID Laura WALLY Olmos      hydrOXYzine HCL  100 mg Oral Q6H PRN Max 4/day Basilio Brown MD      Or    LORazepam  2 mg Intramuscular Q6H PRN Basilio Brown MD      hydrOXYzine HCL  25 mg Oral Q6H PRN Max 4/day Basilio Brown MD      levothyroxine  37.5 mcg Oral Early Morning LauraWALLY Tony      melatonin  6 mg Oral HS PRN WALLY Gomez      methocarbamol  500 mg Oral Q6H PRN WALLY Baptiste      OLANZapine  5 mg Oral Q4H PRN Max 3/day Basilio Brown MD      Or    OLANZapine  2.5 mg Intramuscular Q4H PRN Max 3/day Basilio Brown MD      OLANZapine  5 mg Oral Q3H PRN Max 3/day Basilio Brown MD      Or    OLANZapine  5 mg Intramuscular Q3H PRN Max 3/day Basilio Brown MD      OLANZapine  2.5 mg Oral Q4H PRN Max 6/day Basilio Brown MD      polyethylene glycol  17 g Oral Daily PRN Basilio rBown MD      propranolol  10 mg Oral Q8H PRN Basilio Brown MD      senna-docusate sodium  2 tablet Oral After Dinner Dustin Mcallister MD      sertraline  150 mg Oral Daily WALLY Gomez      traZODone  50 mg Oral HS PRN Basilio Borwn MD         Risks / Benefits of Treatment:    Risks, benefits, and possible side effects of medications explained to patient and  "patient verbalizes understanding and agreement for treatment.    Subjective:    Behavior over the last 24 hours: unchanged.       Per staff, Franco remains meal and medication compliant.  He is awaiting placement and has meeting with [facility] today.      Franco was seen in the hallway today away from peers for psychiatric follow up.  He reports that his mood is \"okay\" today.  He had meeting with social security and feels as if he is making some progress towards discharge.  His moore has been growing and when asked about shaving he reports \"I'm growing this beard as a joke until I am discharged\".  He continues to present poor grooming/hygiene but reports he is showering.  He denies any depression or anxiety today and reports that he feels hopeful.  He denies suicidal or homicidal ideation intent or plan.  He denies any side effects from medications.  He denies auditory or visual hallucinations and does not appear to be internally preoccupied.       Sleep: normal  Appetite: normal  Medication side effects: No   ROS: no complaints    Mental Status Evaluation:    Appearance:  poor hygiene   Behavior:  pleasant, cooperative, calm   Speech:  normal rate and volume   Mood:  euthymic   Affect:  constricted   Thought Process:  goal directed, linear   Associations: concrete associations   Thought Content:  no overt delusions   Perceptual Disturbances: none   Risk Potential: Suicidal ideation - None  Homicidal ideation - None  Potential for aggression - No   Sensorium:  oriented to person, place, and time/date   Memory:  recent memory intact   Consciousness:  alert and awake   Attention/Concentration: attention span and concentration are age appropriate   Insight:  limited   Judgment: limited   Gait/Station: normal gait/station   Motor Activity: no abnormal movements     Vital signs in last 24 hours:    Temp:  [97.1 °F (36.2 °C)-97.9 °F (36.6 °C)] 97.1 °F (36.2 °C)  HR:  [70-74] 74  BP: (120-130)/(68-83) 120/68  Resp:  [16-17] " 17  SpO2:  [97 %] 97 %  O2 Device: None (Room air)         Laboratory results: I have personally reviewed all pertinent laboratory/tests results    Results from the past 24 hours: No results found for this or any previous visit (from the past 24 hours).  Most Recent Labs:   Lab Results   Component Value Date    WBC 6.75 11/12/2024    RBC 5.06 11/12/2024    HGB 15.9 11/12/2024    HCT 46.2 11/12/2024     11/12/2024    RDW 12.8 11/12/2024    NEUTROABS 4.15 11/12/2024    SODIUM 139 11/12/2024    K 4.0 11/12/2024     11/12/2024    CO2 30 11/12/2024    BUN 17 11/12/2024    CREATININE 0.99 11/12/2024    GLUC 85 11/12/2024    CALCIUM 9.4 11/12/2024    AST 23 11/12/2024    ALT 32 11/12/2024    ALKPHOS 73 11/12/2024    TP 8.1 11/12/2024    ALB 4.4 11/12/2024    TBILI 0.54 11/12/2024    CHOLESTEROL 151 11/26/2024    HDL 39 (L) 11/26/2024    TRIG 170 (H) 11/26/2024    LDLCALC 78 11/26/2024    NONHDLC 112 11/26/2024    BTR1FRYVKXUP 2.532 11/12/2024    FREET4 0.56 (L) 09/10/2024    SYPHILISAB Non-reactive 05/15/2024       Suicide/Homicide Risk Assessment:    Risk of Harm to Self:   Nursing Suicide Risk Assessment Last 24 hours: C-SSRS Risk (Since Last Contact)  Calculated C-SSRS Risk Score (Since Last Contact): No Risk Indicated  Current Specific Risk Factors include: diagnosis of mood disorder  Protective Factors: no current suicidal ideation, ability to communicate with staff on the unit, able to contract for safety on the unit, taking medications as ordered on the unit  Based on today's assessment, Franco presents the following risk of harm to self: low    Risk of Harm to Others:  Nursing Homicide Risk Assessment: Violence Risk to Others: Denies within past 6 months  Current Specific Risk Factors include: none  Protective Factors: no current homicidal ideation, compliant with medications on the unit as ordered, follows staff redirection  Based on today's assessment, Franco presents the following risk of harm to  others: minimal    The following interventions are recommended: Behavioral Health checks for safety monitoring, continued hospitalization on locked unit    Progress Toward Goals: progressing, participates in milieu therapy, mood is stabilizing, he is working on coping skills, compliant with medications.    Counseling / Coordination of Care:    Total floor / unit time spent today 30 minutes. Greater than 50% of total time was spent with the patient and / or family counseling and / or coordination of care. A description of counseling / coordination of care:    Administrative Statements   I have spent a total time of 30 minutes in caring for this patient on the day of the visit/encounter including Diagnostic results, Documenting in the medical record, Reviewing / ordering tests, medicine, procedures  , Obtaining or reviewing history  , and Communicating with other healthcare professionals .    WALLY Gomez 12/13/24

## 2024-12-13 NOTE — PROGRESS NOTES
Pt engaged in 1:1 activity with this writer. Game of scrabble occurred. Pt is pleasant & cooperative t/o. Pt engages in playful banter, appearing brighter than previous engagements.

## 2024-12-13 NOTE — PLAN OF CARE
Problem: Ineffective Coping  Goal: Participates in unit activities  Description: Interventions:  - Provide therapeutic environment   - Provide required programming   - Redirect inappropriate behaviors   Outcome: Progressing     Problem: Depression  Goal: Treatment Goal: Demonstrate behavioral control of depressive symptoms, verbalize feelings of improved mood/affect, and adopt new coping skills prior to discharge  Outcome: Progressing  Goal: Verbalize thoughts and feelings  Description: Interventions:  - Assess and re-assess patient's level of risk   - Engage patient in 1:1 interactions, daily, for a minimum of 15 minutes   - Encourage patient to express feelings, fears, frustrations, hopes   Outcome: Progressing  Goal: Refrain from harming self  Description: Interventions:  - Monitor patient closely, per order   - Supervise medication ingestion, monitor effects and side effects   Outcome: Progressing  Goal: Refrain from isolation  Description: Interventions:  - Develop a trusting relationship   - Encourage socialization   Outcome: Progressing     Problem: Anxiety  Goal: Anxiety is at manageable level  Description: Interventions:  - Assess and monitor patient's anxiety level.   - Monitor for signs and symptoms (heart palpitations, chest pain, shortness of breath, headaches, nausea, feeling jumpy, restlessness, irritable, apprehensive).   - Collaborate with interdisciplinary team and initiate plan and interventions as ordered.  - [place] patient to unit/surroundings  - Explain treatment plan  - Encourage participation in care  - Encourage verbalization of concerns/fears  - Identify coping mechanisms  - Assist in developing anxiety-reducing skills  - Administer/offer alternative therapies  - Limit or eliminate stimulants  Outcome: Progressing     Problem: DISCHARGE PLANNING - CARE MANAGEMENT  Goal: Discharge to post-acute care or home with appropriate resources  Description: INTERVENTIONS:  - Conduct assessment to  determine patient/family and health care team treatment goals, and need for post-acute services based on payer coverage, community resources, and patient preferences, and barriers to discharge  - Address psychosocial, clinical, and financial barriers to discharge as identified in assessment in conjunction with the patient/family and health care team  - Arrange appropriate level of post-acute services according to patient’s   needs and preference and payer coverage in collaboration with the physician and health care team  - Communicate with and update the patient/family, physician, and health care team regarding progress on the discharge plan  - Arrange appropriate transportation to post-acute venues  Outcome: Progressing     Problem: Knowledge Deficit  Goal: Patient/family/caregiver demonstrates understanding of disease process, treatment plan, medications, and discharge instructions  Description: Complete learning assessment and assess knowledge base.  Interventions:  - Provide teaching at level of understanding  - Provide teaching via preferred learning methods  Outcome: Progressing     Problem: SLEEP DISTURBANCE  Goal: Will exhibit normal sleeping pattern  Description: Interventions:  -  Assess the patients sleep pattern, noting recent changes  - Administer medication as ordered  - Decrease environmental stimuli, including noise, as appropriate during the night  - Encourage the patient to actively participate in unit groups and or exercise during the day to enhance ability to achieve adequate sleep at night  - Assess the patient, in the morning, encouraging a description of sleep experience  Outcome: Progressing

## 2024-12-14 PROCEDURE — 99232 SBSQ HOSP IP/OBS MODERATE 35: CPT | Performed by: PSYCHIATRY & NEUROLOGY

## 2024-12-14 RX ADMIN — ATORVASTATIN CALCIUM 10 MG: 10 TABLET, FILM COATED ORAL at 17:33

## 2024-12-14 RX ADMIN — ARIPIPRAZOLE 5 MG: 5 TABLET ORAL at 09:28

## 2024-12-14 RX ADMIN — FAMOTIDINE 20 MG: 20 TABLET ORAL at 09:28

## 2024-12-14 RX ADMIN — SERTRALINE HYDROCHLORIDE 150 MG: 100 TABLET ORAL at 09:28

## 2024-12-14 RX ADMIN — SENNOSIDES AND DOCUSATE SODIUM 2 TABLET: 50; 8.6 TABLET ORAL at 17:33

## 2024-12-14 RX ADMIN — CYANOCOBALAMIN TAB 1000 MCG 1000 MCG: 1000 TAB at 09:28

## 2024-12-14 RX ADMIN — LEVOTHYROXINE SODIUM 37.5 MCG: 125 TABLET ORAL at 06:30

## 2024-12-14 RX ADMIN — FAMOTIDINE 20 MG: 20 TABLET ORAL at 17:33

## 2024-12-14 RX ADMIN — Medication 2500 UNITS: at 09:28

## 2024-12-14 NOTE — NURSING NOTE
Patient is calm with a blunted affect denying depression, anxiety, SI/HI/AVH. Withdrawn to room throughout the day in bed, hair is greasy in appearance. No unmet needs currently.

## 2024-12-14 NOTE — PLAN OF CARE
Problem: Ineffective Coping  Goal: Participates in unit activities  Description: Interventions:  - Provide therapeutic environment   - Provide required programming   - Redirect inappropriate behaviors   Outcome: Progressing     Problem: Depression  Goal: Treatment Goal: Demonstrate behavioral control of depressive symptoms, verbalize feelings of improved mood/affect, and adopt new coping skills prior to discharge  Outcome: Progressing  Goal: Verbalize thoughts and feelings  Description: Interventions:  - Assess and re-assess patient's level of risk   - Engage patient in 1:1 interactions, daily, for a minimum of 15 minutes   - Encourage patient to express feelings, fears, frustrations, hopes   Outcome: Progressing  Goal: Refrain from harming self  Description: Interventions:  - Monitor patient closely, per order   - Supervise medication ingestion, monitor effects and side effects   Outcome: Progressing  Goal: Refrain from isolation  Description: Interventions:  - Develop a trusting relationship   - Encourage socialization   Outcome: Progressing     Problem: Anxiety  Goal: Anxiety is at manageable level  Description: Interventions:  - Assess and monitor patient's anxiety level.   - Monitor for signs and symptoms (heart palpitations, chest pain, shortness of breath, headaches, nausea, feeling jumpy, restlessness, irritable, apprehensive).   - Collaborate with interdisciplinary team and initiate plan and interventions as ordered.  - [place] patient to unit/surroundings  - Explain treatment plan  - Encourage participation in care  - Encourage verbalization of concerns/fears  - Identify coping mechanisms  - Assist in developing anxiety-reducing skills  - Administer/offer alternative therapies  - Limit or eliminate stimulants  Outcome: Progressing     Problem: DISCHARGE PLANNING - CARE MANAGEMENT  Goal: Discharge to post-acute care or home with appropriate resources  Description: INTERVENTIONS:  - Conduct assessment to  determine patient/family and health care team treatment goals, and need for post-acute services based on payer coverage, community resources, and patient preferences, and barriers to discharge  - Address psychosocial, clinical, and financial barriers to discharge as identified in assessment in conjunction with the patient/family and health care team  - Arrange appropriate level of post-acute services according to patient’s   needs and preference and payer coverage in collaboration with the physician and health care team  - Communicate with and update the patient/family, physician, and health care team regarding progress on the discharge plan  - Arrange appropriate transportation to post-acute venues  Outcome: Progressing     Problem: Knowledge Deficit  Goal: Patient/family/caregiver demonstrates understanding of disease process, treatment plan, medications, and discharge instructions  Description: Complete learning assessment and assess knowledge base.  Interventions:  - Provide teaching at level of understanding  - Provide teaching via preferred learning methods  Outcome: Progressing     Problem: SLEEP DISTURBANCE  Goal: Will exhibit normal sleeping pattern  Description: Interventions:  -  Assess the patients sleep pattern, noting recent changes  - Administer medication as ordered  - Decrease environmental stimuli, including noise, as appropriate during the night  - Encourage the patient to actively participate in unit groups and or exercise during the day to enhance ability to achieve adequate sleep at night  - Assess the patient, in the morning, encouraging a description of sleep experience  Outcome: Progressing

## 2024-12-14 NOTE — NURSING NOTE
Pt calm, cooperative, and pleasant during assessment. Denies any SI/HI/AVH at this time. Pt brightens upon approach. Visualized socializing with select peers, enjoys walking the milieu. Focused on discharge. Currently denies any unmet needs. Q15 minute checks maintained for safety.

## 2024-12-14 NOTE — ASSESSMENT & PLAN NOTE
"Patient continues to be pending group home placement. Otherwise no clinical significant change.  Pt denied SSI and SSD, CM cont to work on personal care home placement and assisting pt w/ finances. Worked w/ SW to place money in [employer] account. SSC meeting today.    Abilify 5 mg daily as mood adjunct   Zoloft 150 mg daily for depression and anxiety  Continue to encourage participation in group therapy, milieu therapy and occupational therapy.  Continue to assess for side effects of medications.  Continue collaboration with [facility] for medical co-morbidities as indicated.  Continue discussion with CM/SW to assist with obtaining collateral, disposition planning, and the implementation of patient-centered individualized plan of care.  Continue frequent safety checks and vitals per unit protocol.    Risks, benefits and possible side effects of Medications: Risks, benefits, and possible side effects of medications have previously been explained. No new medications at this time.      Legal status: 201    Disposition: to be determined, pending SOAR application for potential group home placement. OT Cognitive Evaluation completed: \"Pt would benefit from discharge to a supportive environment that can provide checks for safety and compliance with IADLs. \"   Although patient's mood has stabilized, they are currently awaiting group home placement.  Their ability to recognize safety hazards take medications and participate in IADLs are significantly impaired by their cognitive deficits compounded by their chronic mental health needs and would be at risk for significant decompensation without the structure and support of a group home setting.      No associated orders from this encounter found during lookback period of 72 hours.  "

## 2024-12-14 NOTE — PROGRESS NOTES
"    Psychiatric Progress Note - Department of Behavioral Health   Franco Roberson 39 y.o. male MRN: [id number]  Unit/Bed#: [facility] 347-02 Encounter: [id number]    ASSESSMENT & PLAN     Assessment & Plan  MDD (major depressive disorder), recurrent severe, without psychosis (HCC)  Patient continues to be pending group home placement. Otherwise no clinical significant change.  Pt denied SSI and SSD, CM cont to work on personal care home placement and assisting pt w/ finances. Worked w/ SW to place money in [employer] account. SSC meeting today.    Abilify 5 mg daily as mood adjunct   Zoloft 150 mg daily for depression and anxiety  Continue to encourage participation in group therapy, milieu therapy and occupational therapy.  Continue to assess for side effects of medications.  Continue collaboration with [facility] for medical co-morbidities as indicated.  Continue discussion with CM/SW to assist with obtaining collateral, disposition planning, and the implementation of patient-centered individualized plan of care.  Continue frequent safety checks and vitals per unit protocol.    Risks, benefits and possible side effects of Medications: Risks, benefits, and possible side effects of medications have previously been explained. No new medications at this time.      Legal status: 201    Disposition: to be determined, pending SOAR application for potential group home placement. OT Cognitive Evaluation completed: \"Pt would benefit from discharge to a supportive environment that can provide checks for safety and compliance with IADLs. \"   Although patient's mood has stabilized, they are currently awaiting group home placement.  Their ability to recognize safety hazards take medications and participate in IADLs are significantly impaired by their cognitive deficits compounded by their chronic mental health needs and would be at risk for significant decompensation without the structure and support of a group home setting.      No associated orders " "from this encounter found during lookback period of 72 hours.  Autism spectrum disorder  Continue supportive care    No associated orders from this encounter found during lookback period of 72 hours.        SUBJECTIVE     Patient evaluated this a.m. for continuity of care. Patient was discussed at length with nursing and treatment team. Per nursing, patient continues not to shower.  Otherwise has been in good behavioral control.    Patient today is reporting his mood as \"alright.\"  He reports he has been feeling the same since the last time he saw this writer.  He endorsed adequate sleep and appetite.  He is endorsing no medication side effects.  He denied auditory and visual hallucinations.  He is denying suicidal and homicidal ideation.    PSYCHIATRIC REVIEW OF SYSTEMS     Behavior over the last 24 hours:  unchanged  Sleep: normal  Appetite: normal  Medication side effects: No    REVIEW OF SYSTEMS   Review of systems: no complaints    OBJECTIVE     Vital Signs in Past 24 Hours:  Temp:  [97.6 °F (36.4 °C)] 97.6 °F (36.4 °C)  HR:  [62-79] 62  BP: (115-116)/(56-70) 115/56  Resp:  [17] 17  SpO2:  [97 %-98 %] 97 %  O2 Device: None (Room air)    Intake/Output in Past 24 hours:  No intake/output data recorded.  No intake/output data recorded.        Laboratory Results:  I have personally reviewed all pertinent laboratory/tests results.    Behavioral Health Medications: all current active meds have been reviewed.    Mental Status Evaluation:  Appearance:  Disheveled, poor grooming/hygiene, dressed in hospital attire, appears stated age, overtly  male   Behavior:  Pleasant and cooperative soft volume and normal rate   Speech:  Soft volume and normal rate   Mood:  \"Alright\"   Affect:  blunted and mood-incongruent   Language Within normal limits   Thought Process:  concrete   Thought Content:  No overt delusions or paranoia   Perceptual Disturbances: Denied auditory and visual hallucinations.  Does not appear to be " responding to internal stimuli.   Risk Potential: Suicidal Ideations none, Homicidal Ideations none, and Potential for Aggression No   Sensorium:  person, place, and time/date   Cognition:  recent and remote memory grossly intact   Consciousness:  alert and awake    Attention: attention span and concentration were age appropriate   Insight:  limited   Judgment: limited   Intellect Not assessed   Gait/Station: normal gait/station   Motor Activity: no abnormal movements     Memory: Short and long term memory intact     Progress Toward Goals: Unchanged, as evidenced by their participation (or lack thereof) in individual, social and therapeutic milieu in addition to adherence to their medication regimen.    Recommended Treatment:   See above for assessment and plan.    Inpatient Psychiatric Certification: Based upon physical, mental and social evaluations, I certify that inpatient psychiatric services are medically necessary for this patient for a duration of greater than 2 midnights for the treatment of MDD (major depressive disorder), recurrent severe, without psychosis (HCC) including psychotropic medication management, participation in the therapeutic milieu and referrals as indicated. Available alternative community resources do not meet the patient's mental health care needs. I further attest that an established written individualized plan of care has been implemented and is outlined in the patient's medical records.    Counseling/Coordination of Care    I have expended greater than 15 minutes in which more than 50% of this time was expended in counseling/coordination of patient care relating to diagnostic results, prognosis, potential risks and benefits of management options, instructions for appropriate management, patient and/or collateral education, importance of adherence to management and/or risk factor reductions. Patient's rights, confidentiality, exceptions to confidentiality, use of [facility] medical  record including appropriate staff access to medical record regarding behavioral health services and consent to treatment were reviewed.    Note Share:     This note was not shared with the patient due to reasonable likelihood of causing patient harm     This note has been constructed using a voice recognition system. There may be translation, syntax,  or grammatical errors. If you have any questions, please contact the dictating provider.    Jaret Rodriguez, DO 12/14/24   PGY-II

## 2024-12-15 VITALS
TEMPERATURE: 97.3 F | SYSTOLIC BLOOD PRESSURE: 115 MMHG | RESPIRATION RATE: 16 BRPM | OXYGEN SATURATION: 98 % | HEART RATE: 72 BPM | WEIGHT: 290 LBS | BODY MASS INDEX: 39.28 KG/M2 | DIASTOLIC BLOOD PRESSURE: 65 MMHG | HEIGHT: 72 IN

## 2024-12-15 PROCEDURE — 99232 SBSQ HOSP IP/OBS MODERATE 35: CPT | Performed by: PSYCHIATRY & NEUROLOGY

## 2024-12-15 RX ADMIN — CYANOCOBALAMIN TAB 1000 MCG 1000 MCG: 1000 TAB at 08:27

## 2024-12-15 RX ADMIN — SERTRALINE HYDROCHLORIDE 150 MG: 100 TABLET ORAL at 08:27

## 2024-12-15 RX ADMIN — ATORVASTATIN CALCIUM 10 MG: 10 TABLET, FILM COATED ORAL at 17:34

## 2024-12-15 RX ADMIN — ARIPIPRAZOLE 5 MG: 5 TABLET ORAL at 08:27

## 2024-12-15 RX ADMIN — SENNOSIDES AND DOCUSATE SODIUM 2 TABLET: 50; 8.6 TABLET ORAL at 17:34

## 2024-12-15 RX ADMIN — FAMOTIDINE 20 MG: 20 TABLET ORAL at 08:27

## 2024-12-15 RX ADMIN — LEVOTHYROXINE SODIUM 37.5 MCG: 125 TABLET ORAL at 06:04

## 2024-12-15 RX ADMIN — Medication 2500 UNITS: at 08:27

## 2024-12-15 RX ADMIN — FAMOTIDINE 20 MG: 20 TABLET ORAL at 17:34

## 2024-12-15 NOTE — NURSING NOTE
Patient is calm with a blunted affect denying depression, anxiety, SI/HI/AVH. Withdrawn to room saying he doesn't go to groups on the weekend but does during the week. Says he showered yesterday, hair appears greasy. No unmet needs currently.

## 2024-12-15 NOTE — PLAN OF CARE
Problem: Depression  Goal: Treatment Goal: Demonstrate behavioral control of depressive symptoms, verbalize feelings of improved mood/affect, and adopt new coping skills prior to discharge  Outcome: Progressing  Goal: Verbalize thoughts and feelings  Description: Interventions:  - Assess and re-assess patient's level of risk   - Engage patient in 1:1 interactions, daily, for a minimum of 15 minutes   - Encourage patient to express feelings, fears, frustrations, hopes   Outcome: Progressing  Goal: Refrain from harming self  Description: Interventions:  - Monitor patient closely, per order   - Supervise medication ingestion, monitor effects and side effects   Outcome: Progressing  Goal: Refrain from isolation  Description: Interventions:  - Develop a trusting relationship   - Encourage socialization   Outcome: Progressing     Problem: Anxiety  Goal: Anxiety is at manageable level  Description: Interventions:  - Assess and monitor patient's anxiety level.   - Monitor for signs and symptoms (heart palpitations, chest pain, shortness of breath, headaches, nausea, feeling jumpy, restlessness, irritable, apprehensive).   - Collaborate with interdisciplinary team and initiate plan and interventions as ordered.  - [place] patient to unit/surroundings  - Explain treatment plan  - Encourage participation in care  - Encourage verbalization of concerns/fears  - Identify coping mechanisms  - Assist in developing anxiety-reducing skills  - Administer/offer alternative therapies  - Limit or eliminate stimulants  Outcome: Progressing     Problem: DISCHARGE PLANNING - CARE MANAGEMENT  Goal: Discharge to post-acute care or home with appropriate resources  Description: INTERVENTIONS:  - Conduct assessment to determine patient/family and health care team treatment goals, and need for post-acute services based on payer coverage, community resources, and patient preferences, and barriers to discharge  - Address psychosocial, clinical,  and financial barriers to discharge as identified in assessment in conjunction with the patient/family and health care team  - Arrange appropriate level of post-acute services according to patient’s   needs and preference and payer coverage in collaboration with the physician and health care team  - Communicate with and update the patient/family, physician, and health care team regarding progress on the discharge plan  - Arrange appropriate transportation to post-acute venues  Outcome: Progressing     Problem: Knowledge Deficit  Goal: Patient/family/caregiver demonstrates understanding of disease process, treatment plan, medications, and discharge instructions  Description: Complete learning assessment and assess knowledge base.  Interventions:  - Provide teaching at level of understanding  - Provide teaching via preferred learning methods  Outcome: Progressing     Problem: SLEEP DISTURBANCE  Goal: Will exhibit normal sleeping pattern  Description: Interventions:  -  Assess the patients sleep pattern, noting recent changes  - Administer medication as ordered  - Decrease environmental stimuli, including noise, as appropriate during the night  - Encourage the patient to actively participate in unit groups and or exercise during the day to enhance ability to achieve adequate sleep at night  - Assess the patient, in the morning, encouraging a description of sleep experience  Outcome: Progressing

## 2024-12-15 NOTE — PROGRESS NOTES
"    Psychiatric Progress Note - Department of Behavioral Health   Franco Roberson 39 y.o. male MRN: [id number]  Unit/Bed#: [facility] 347-02 Encounter: [id number]    ASSESSMENT & PLAN     Assessment & Plan  MDD (major depressive disorder), recurrent severe, without psychosis (HCC)  Patient continues to be pending group home placement. Otherwise no clinical significant change.  Pt denied SSI and SSD, CM cont to work on personal care home placement and assisting pt w/ finances. Worked w/ SW to place money in ABLE account. SSC meeting today.    Abilify 5 mg daily as mood adjunct   Zoloft 150 mg daily for depression and anxiety  Continue to encourage participation in group therapy, milieu therapy and occupational therapy.  Continue to assess for side effects of medications.  Continue collaboration with [facility]IM for medical co-morbidities as indicated.  Continue discussion with CM/SW to assist with obtaining collateral, disposition planning, and the implementation of patient-centered individualized plan of care.  Continue frequent safety checks and vitals per unit protocol.    Risks, benefits and possible side effects of Medications: Risks, benefits, and possible side effects of medications have previously been explained. No new medications at this time.      Legal status: 201    Disposition: to be determined, pending SOAR application for potential group home placement. OT Cognitive Evaluation completed: \"Pt would benefit from discharge to a supportive environment that can provide checks for safety and compliance with IADLs. \"   Although patient's mood has stabilized, they are currently awaiting group home placement.  Their ability to recognize safety hazards take medications and participate in IADLs are significantly impaired by their cognitive deficits compounded by their chronic mental health needs and would be at risk for significant decompensation without the structure and support of a group home setting.      No associated orders " "from this encounter found during lookback period of 72 hours.  Autism spectrum disorder  Continue supportive care    No associated orders from this encounter found during lookback period of 72 hours.        SUBJECTIVE     Patient evaluated this a.m. for continuity of care. Patient was discussed at length with nursing and treatment team. Per nursing, patient continues to be in good behavioral control.    Patient today is reporting his mood is \"pretty good.\"  He endorsed adequate sleep.  He endorsed adequate appetite.  He is denying medication side effects.  He emphasized that he has not experienced medication side effects once since starting his medications.  He is denying suicidal ideation.  He is denying homicidal ideation.  He is denying auditory and visual hallucinations.    PSYCHIATRIC REVIEW OF SYSTEMS     Behavior over the last 24 hours:  unchanged  Sleep: normal  Appetite: normal  Medication side effects: No    REVIEW OF SYSTEMS   Review of systems: no complaints    OBJECTIVE     Vital Signs in Past 24 Hours:  Temp:  [97 °F (36.1 °C)-97.6 °F (36.4 °C)] 97.6 °F (36.4 °C)  HR:  [62-66] 62  BP: (113-116)/(61-67) 116/67  Resp:  [16] 16  SpO2:  [98 %-99 %] 99 %  O2 Device: None (Room air)    Intake/Output in Past 24 hours:  No intake/output data recorded.  No intake/output data recorded.        Laboratory Results:  I have personally reviewed all pertinent laboratory/tests results.    Behavioral Health Medications: all current active meds have been reviewed.    Mental Status Evaluation:  Appearance:  Disheveled, poor grooming/hygiene, dressed in hospital attire, appears stated age, overtly  male   Behavior:  Pleasant and cooperative soft volume and normal rate   Speech:  Soft volume and normal rate   Mood:  \"Pretty good\"   Affect:  blunted and mood-incongruent   Language Within normal limits   Thought Process:  concrete   Thought Content:  No overt delusions or paranoia   Perceptual Disturbances: Denied " auditory and visual hallucinations.  Does not appear to be responding to internal stimuli.   Risk Potential: Suicidal Ideations none, Homicidal Ideations none, and Potential for Aggression No   Sensorium:  person, place, and time/date   Cognition:  recent and remote memory grossly intact   Consciousness:  alert and awake    Attention: attention span and concentration were age appropriate   Insight:  limited   Judgment: limited   Intellect Not assessed   Gait/Station: normal gait/station   Motor Activity: no abnormal movements     Memory: Short and long term memory intact     Progress Toward Goals: Unchanged, as evidenced by their participation (or lack thereof) in individual, social and therapeutic milieu in addition to adherence to their medication regimen.    Recommended Treatment:   See above for assessment and plan.    Inpatient Psychiatric Certification: Based upon physical, mental and social evaluations, I certify that inpatient psychiatric services are medically necessary for this patient for a duration of greater than 2 midnights for the treatment of MDD (major depressive disorder), recurrent severe, without psychosis (HCC) including psychotropic medication management, participation in the therapeutic milieu and referrals as indicated. Available alternative community resources do not meet the patient's mental health care needs. I further attest that an established written individualized plan of care has been implemented and is outlined in the patient's medical records.    Counseling/Coordination of Care    I have expended greater than 15 minutes in which more than 50% of this time was expended in counseling/coordination of patient care relating to diagnostic results, prognosis, potential risks and benefits of management options, instructions for appropriate management, patient and/or collateral education, importance of adherence to management and/or risk factor reductions. Patient's rights, confidentiality,  exceptions to confidentiality, use of electronic medical record including appropriate staff access to medical record regarding behavioral health services and consent to treatment were reviewed.    Note Share:     This note was not shared with the patient due to reasonable likelihood of causing patient harm     This note has been constructed using a voice recognition system. There may be translation, syntax,  or grammatical errors. If you have any questions, please contact the dictating provider.    Jaret Rodriguez, DO 12/15/24   PGY-II

## 2024-12-15 NOTE — NURSING NOTE
Patient has been withdrawn to himself and his room all evening. He brightens upon approach and is pleasant and appropriate in interaction. He has not attended any groups today or this evening.  He denies S.I.H.I.A/H V/H

## 2024-12-15 NOTE — ASSESSMENT & PLAN NOTE
"Patient continues to be pending group home placement. Otherwise no clinical significant change.  Pt denied SSI and SSD, CM cont to work on personal care home placement and assisting pt w/ finances. Worked w/ SW to place money in [facility] account. SSC meeting today.    Abilify 5 mg daily as mood adjunct   Zoloft 150 mg daily for depression and anxiety  Continue to encourage participation in group therapy, milieu therapy and occupational therapy.  Continue to assess for side effects of medications.  Continue collaboration with [facility] for medical co-morbidities as indicated.  Continue discussion with CM/SW to assist with obtaining collateral, disposition planning, and the implementation of patient-centered individualized plan of care.  Continue frequent safety checks and vitals per unit protocol.    Risks, benefits and possible side effects of Medications: Risks, benefits, and possible side effects of medications have previously been explained. No new medications at this time.      Legal status: 201    Disposition: to be determined, pending SOAR application for potential group home placement. OT Cognitive Evaluation completed: \"Pt would benefit from discharge to a supportive environment that can provide checks for safety and compliance with IADLs. \"   Although patient's mood has stabilized, they are currently awaiting group home placement.  Their ability to recognize safety hazards take medications and participate in IADLs are significantly impaired by their cognitive deficits compounded by their chronic mental health needs and would be at risk for significant decompensation without the structure and support of a group home setting.      No associated orders from this encounter found during lookback period of 72 hours.  "

## 2024-12-16 PROCEDURE — 99232 SBSQ HOSP IP/OBS MODERATE 35: CPT | Performed by: PSYCHIATRY & NEUROLOGY

## 2024-12-16 RX ADMIN — LEVOTHYROXINE SODIUM 37.5 MCG: 125 TABLET ORAL at 05:57

## 2024-12-16 RX ADMIN — ARIPIPRAZOLE 5 MG: 5 TABLET ORAL at 08:37

## 2024-12-16 RX ADMIN — FAMOTIDINE 20 MG: 20 TABLET ORAL at 17:10

## 2024-12-16 RX ADMIN — FAMOTIDINE 20 MG: 20 TABLET ORAL at 08:37

## 2024-12-16 RX ADMIN — CYANOCOBALAMIN TAB 1000 MCG 1000 MCG: 1000 TAB at 08:37

## 2024-12-16 RX ADMIN — SERTRALINE HYDROCHLORIDE 150 MG: 100 TABLET ORAL at 08:37

## 2024-12-16 RX ADMIN — ATORVASTATIN CALCIUM 10 MG: 10 TABLET, FILM COATED ORAL at 17:11

## 2024-12-16 RX ADMIN — Medication 2500 UNITS: at 08:37

## 2024-12-16 RX ADMIN — SENNOSIDES AND DOCUSATE SODIUM 2 TABLET: 50; 8.6 TABLET ORAL at 17:11

## 2024-12-16 NOTE — NURSING NOTE
Pt is calm, cooperative, social with select peers and staff. Pt is mostly withdrawn walking in hallway keeping to himself. Pt denies SI, HI, AH, VH and pain at this time. Pt goes to groups, states he is doing ADLs and taking medications as prescribed. Pt denies any unmet needs and remains on q15 min checks.

## 2024-12-16 NOTE — ASSESSMENT & PLAN NOTE
"Patient continues to be pending group home placement. Otherwise no clinical significant change.  Pt and SW able to have meeting w/ SSC on 12/13. Awaiting further updates    Abilify 5 mg daily as mood adjunct   Zoloft 150 mg daily for depression and anxiety  Continue to encourage participation in group therapy, milieu therapy and occupational therapy.  Continue to assess for side effects of medications.  Continue collaboration with [facility] for medical co-morbidities as indicated.  Continue discussion with CM/SW to assist with obtaining collateral, disposition planning, and the implementation of patient-centered individualized plan of care.  Continue frequent safety checks and vitals per unit protocol.    Risks, benefits and possible side effects of Medications: Risks, benefits, and possible side effects of medications have previously been explained. No new medications at this time.      Legal status: 201    Disposition: to be determined, pending SOAR application for potential group home placement. OT Cognitive Evaluation completed: \"Pt would benefit from discharge to a supportive environment that can provide checks for safety and compliance with IADLs. \"   Although patient's mood has stabilized, they are currently awaiting group home placement.  Their ability to recognize safety hazards take medications and participate in IADLs are significantly impaired by their cognitive deficits compounded by their chronic mental health needs and would be at risk for significant decompensation without the structure and support of a group home setting.      No associated orders from this encounter found during lookback period of 72 hours.  "

## 2024-12-16 NOTE — PROGRESS NOTES
"Progress Note - Behavioral Health   Name: Franco Roberson 39 y.o. male I MRN: [id number]  Unit/Bed#: -02 I Date of Admission: 5/14/2024   Date of Service: 12/16/2024 I Hospital Day: 216     Assessment & Plan  MDD (major depressive disorder), recurrent severe, without psychosis (HCC)  Patient continues to be pending group home placement. Otherwise no clinical significant change.  Pt and SW able to have meeting w/ SSC on 12/13. Awaiting further updates    Abilify 5 mg daily as mood adjunct   Zoloft 150 mg daily for depression and anxiety  Continue to encourage participation in group therapy, milieu therapy and occupational therapy.  Continue to assess for side effects of medications.  Continue collaboration with [facility] for medical co-morbidities as indicated.  Continue discussion with CM/SW to assist with obtaining collateral, disposition planning, and the implementation of patient-centered individualized plan of care.  Continue frequent safety checks and vitals per unit protocol.    Risks, benefits and possible side effects of Medications: Risks, benefits, and possible side effects of medications have previously been explained. No new medications at this time.      Legal status: 201    Disposition: to be determined, pending SOAR application for potential group home placement. OT Cognitive Evaluation completed: \"Pt would benefit from discharge to a supportive environment that can provide checks for safety and compliance with IADLs. \"   Although patient's mood has stabilized, they are currently awaiting group home placement.  Their ability to recognize safety hazards take medications and participate in IADLs are significantly impaired by their cognitive deficits compounded by their chronic mental health needs and would be at risk for significant decompensation without the structure and support of a group home setting.      No associated orders from this encounter found during lookback period of 72 hours.  Autism " spectrum disorder  Continue supportive care    No associated orders from this encounter found during lookback period of 72 hours.        Plan     Recommended Treatment:    - Encourage early mobility and having a structured day  - Provide frequent re-orientation, and cognitive stimulation  - Ensure assistive devices are in proper working order (eye-glasses, hearing aids)  - Encourage adequate hydration, nutrition and monitor bowel movements  - Maintain sleep-wake cycle: Uninterrupted sleep time; low-level lighting at night  - Fall precaution  - f/u [facility] recs regarding the medical problems   - Continue medication titration and treatment plan; adjust medication to optimize treatment response and as clinically indicated. .   - Observation: routine            - VS: as per unit protocol  - Diet: Regular diet  - Encourage group attendance and milieu therapy  - Dispo: To be determined     Scheduled medications:  Current Facility-Administered Medications   Medication Dose Route Frequency Provider Last Rate    acetaminophen  650 mg Oral Q6H PRN Basilio Brown MD      acetaminophen  650 mg Oral Q4H PRN Basilio Brown MD      acetaminophen  975 mg Oral Q6H PRN Basilio Brown MD      aluminum-magnesium hydroxide-simethicone  30 mL Oral Q4H PRN Basilio Brown MD      ARIPiprazole  5 mg Oral Daily Basilio Panda MD      Artificial Tears  1 drop Both Eyes Q3H PRN Basilio Brown MD      atorvastatin  10 mg Oral Daily With Dinner WALLY Baptiste      benztropine  1 mg Intramuscular Q4H PRN Max 6/day Basilio Brown MD      benztropine  1 mg Oral Q4H PRN Max 6/day Basilio Brown MD      Cholecalciferol  2,500 Units Oral Daily Dustin Mcallister MD      cyanocobalamin  1,000 mcg Oral Daily WALLY Baptiste      Diclofenac Sodium  2 g Topical 4x Daily PRN WALLY Baptiste      hydrOXYzine HCL  50 mg Oral Q6H PRN Max 4/day Basilio Brown MD      Or     diphenhydrAMINE  50 mg Intramuscular Q6H PRN Basilio Brown MD      diphenhydrAMINE-zinc acetate   Topical Daily PRN Raj Guerrero MD      famotidine  20 mg Oral BID Laura Romegrady Ashford, WALLY      hydrOXYzine HCL  100 mg Oral Q6H PRN Max 4/day Basilio Brown MD      Or    LORazepam  2 mg Intramuscular Q6H PRN Basilio Brown MD      hydrOXYzine HCL  25 mg Oral Q6H PRN Max 4/day Basilio Brown MD      levothyroxine  37.5 mcg Oral Early Morning Laura Amber Ashford, WALLY      melatonin  6 mg Oral HS PRN WALLY Gomez      methocarbamol  500 mg Oral Q6H PRN LauraWALLY Tony      OLANZapine  5 mg Oral Q4H PRN Max 3/day Basilio Brown MD      Or    OLANZapine  2.5 mg Intramuscular Q4H PRN Max 3/day Basilio Brown MD      OLANZapine  5 mg Oral Q3H PRN Max 3/day Basilio Brown MD      Or    OLANZapine  5 mg Intramuscular Q3H PRN Max 3/day Basilio Brown MD      OLANZapine  2.5 mg Oral Q4H PRN Max 6/day Basilio Brown MD      polyethylene glycol  17 g Oral Daily PRN Basilio Brown MD      propranolol  10 mg Oral Q8H PRN Basilio Brown MD      senna-docusate sodium  2 tablet Oral After Dinner Dustin Mcallister MD      sertraline  150 mg Oral Daily WALLY Gomez      traZODone  50 mg Oral HS PRN Basilio Brown MD        PRN:    acetaminophen    acetaminophen    acetaminophen    aluminum-magnesium hydroxide-simethicone    Artificial Tears    benztropine    benztropine    Diclofenac Sodium    hydrOXYzine HCL **OR** diphenhydrAMINE    diphenhydrAMINE-zinc acetate    hydrOXYzine HCL **OR** LORazepam    hydrOXYzine HCL    melatonin    methocarbamol    OLANZapine **OR** OLANZapine    OLANZapine **OR** OLANZapine    OLANZapine    polyethylene glycol    propranolol    traZODone       Subjective     Patient was visited on unit for continuing care; chart reviewed and discussed with multidisciplinary treatment team.  On approach, the  "patient was calm and cooperative. Denied any changes in mood, appetite, and energy level. No other acute complaints or concerns at this time No problem initiating and maintaining sleep.  Denied A/VH currently.  Denied SI/HI, intent or plan upon direct inquiry at this time.    Patient continues to be visible in the milieu and interacts with staff and peers. No reports of aggression or self-injurious behavior on unit. No PRN medications used in the past 24 hours.    Patient accepted all offered medications and no adverse effects of medications noted or reported.    Objective    Current Mental Status Evaluation:  Appearance: casually dressed, consistent with stated age  Motor: no psychomotor retardation, no gait abnormalities  Behavior: cooperative, answers questions appropriately  Speech: soft, normal rhythm  Mood: \"ok\"  Affect: constricted  Thought Process: linear and goal-oriented  Thought Content: denies delusions  Risk Potential: denies suicidal ideation, plan, or intent. Denies homicidal ideation  Perceptions: denies auditory hallucinations, denies visual hallucinations,   Sensorium: Oriented to person, place, time, and situation  Cognition: cognitive ability appears intact but was not quantitatively tested  Consciousness: alert and awake  Attention: intact, able to focus without difficulty  Insight: limited  Judgement: limited            Vital signs in last 24 hours:    Temp:  [97.3 °F (36.3 °C)-97.9 °F (36.6 °C)] 97.9 °F (36.6 °C)  HR:  [72-76] 76  BP: (110-115)/(59-65) 110/59  Resp:  [16] 16  SpO2:  [95 %-98 %] 95 %  O2 Device: None (Room air)    Psychiatric Review of Systems:  Medication adverse effects: none  Sleep: unchanged  Appetite: unchanged  Behaviors over the past 24 hours: unchanged    Laboratory results:    I have personally reviewed all pertinent laboratory/tests results  No results found for this or any previous visit (from the past 48 hours).       Progress Toward Goals & Illness Status: "   progressing, mood is stabilizing, discharge planning, placement pending    Patient is not at goal. They are not yet ready for discharge. The patient's condition currently requires active psychopharmacological medication management, interdisciplinary coordination with case management, and the utilization of adjunctive milieu and group therapy to augment psychopharmacological efficacy. The patient's risk of morbidity, and progression or decompensation of psychiatric disease, is higher without this current treatment.     Next of Kin  Extended Emergency Contact Information  Primary Emergency Contact: Lois Roberson  Address: [address] [place] [address] [place]           [address]  Home Phone: [phone]  Relation: Mother    Counseling / Coordination of Care  Patient's progress discussed with staff in treatment team meeting.  Medications, treatment progress and treatment plan reviewed with patient.  Medication education provided to patient.  Educated on importance of medication and treatment compliance.  Supportive therapy provided to patient.  Cognitive techniques utilized during the session.  Reassurance and supportive therapy provided.  Reoriented to reality and reassured.  Encouraged participation in milieu and group therapy on the unit.  Crisis/safety plan discussed with patient.       Dustin Mcallister MD  Attending Psychiatrist   [facility]      This note has been constructed using a voice recognition system. There may be translation, syntax, or grammatical errors. If you have any questions, please contact the dictating provider.

## 2024-12-16 NOTE — PROGRESS NOTES
12/16/24 0842   Team Meeting   Meeting Type Daily Rounds   Team Members Present   Team Members Present Physician;;Nurse   Physician Team Member Ary   Nursing Team Member [facility] Management Team Member Noa   Patient/Family Present   Patient Present No   Patient's Family Present No     Pt is medication and meal compliant. Pt denies SI/HI/AVH. Pt is calm and cooperative. Pt completed his SSA appointment. Pt's discharge is pending placement.

## 2024-12-16 NOTE — NURSING NOTE
Patient was out in the milieu walking in the halls briefly but otherwise was isolative to his room. He is pleasant and appropriate in interaction. He denies S.I.H.I.A/H V/H

## 2024-12-16 NOTE — PLAN OF CARE
Problem: Depression  Goal: Treatment Goal: Demonstrate behavioral control of depressive symptoms, verbalize feelings of improved mood/affect, and adopt new coping skills prior to discharge  Outcome: Progressing  Goal: Verbalize thoughts and feelings  Description: Interventions:  - Assess and re-assess patient's level of risk   - Engage patient in 1:1 interactions, daily, for a minimum of 15 minutes   - Encourage patient to express feelings, fears, frustrations, hopes   Outcome: Progressing  Goal: Refrain from harming self  Description: Interventions:  - Monitor patient closely, per order   - Supervise medication ingestion, monitor effects and side effects   Outcome: Progressing  Goal: Refrain from isolation  Description: Interventions:  - Develop a trusting relationship   - Encourage socialization   Outcome: Progressing     Problem: Anxiety  Goal: Anxiety is at manageable level  Description: Interventions:  - Assess and monitor patient's anxiety level.   - Monitor for signs and symptoms (heart palpitations, chest pain, shortness of breath, headaches, nausea, feeling jumpy, restlessness, irritable, apprehensive).   - Collaborate with interdisciplinary team and initiate plan and interventions as ordered.  - [place] patient to unit/surroundings  - Explain treatment plan  - Encourage participation in care  - Encourage verbalization of concerns/fears  - Identify coping mechanisms  - Assist in developing anxiety-reducing skills  - Administer/offer alternative therapies  - Limit or eliminate stimulants  Outcome: Progressing     Problem: DISCHARGE PLANNING - CARE MANAGEMENT  Goal: Discharge to post-acute care or home with appropriate resources  Description: INTERVENTIONS:  - Conduct assessment to determine patient/family and health care team treatment goals, and need for post-acute services based on payer coverage, community resources, and patient preferences, and barriers to discharge  - Address psychosocial, clinical,  and financial barriers to discharge as identified in assessment in conjunction with the patient/family and health care team  - Arrange appropriate level of post-acute services according to patient’s   needs and preference and payer coverage in collaboration with the physician and health care team  - Communicate with and update the patient/family, physician, and health care team regarding progress on the discharge plan  - Arrange appropriate transportation to post-acute venues  Outcome: Progressing     Problem: Knowledge Deficit  Goal: Patient/family/caregiver demonstrates understanding of disease process, treatment plan, medications, and discharge instructions  Description: Complete learning assessment and assess knowledge base.  Interventions:  - Provide teaching at level of understanding  - Provide teaching via preferred learning methods  Outcome: Progressing     Problem: SLEEP DISTURBANCE  Goal: Will exhibit normal sleeping pattern  Description: Interventions:  -  Assess the patients sleep pattern, noting recent changes  - Administer medication as ordered  - Decrease environmental stimuli, including noise, as appropriate during the night  - Encourage the patient to actively participate in unit groups and or exercise during the day to enhance ability to achieve adequate sleep at night  - Assess the patient, in the morning, encouraging a description of sleep experience  Outcome: Progressing     Problem: Ineffective Coping  Goal: Participates in unit activities  Description: Interventions:  - Provide therapeutic environment   - Provide required programming   - Redirect inappropriate behaviors   Outcome: Not Progressing  Note: No attendance at groups this weekend

## 2024-12-17 PROCEDURE — 99232 SBSQ HOSP IP/OBS MODERATE 35: CPT | Performed by: PSYCHIATRY & NEUROLOGY

## 2024-12-17 RX ADMIN — Medication 2500 UNITS: at 08:30

## 2024-12-17 RX ADMIN — LEVOTHYROXINE SODIUM 37.5 MCG: 125 TABLET ORAL at 07:01

## 2024-12-17 RX ADMIN — ARIPIPRAZOLE 5 MG: 5 TABLET ORAL at 08:30

## 2024-12-17 RX ADMIN — ATORVASTATIN CALCIUM 10 MG: 10 TABLET, FILM COATED ORAL at 17:19

## 2024-12-17 RX ADMIN — SERTRALINE HYDROCHLORIDE 150 MG: 100 TABLET ORAL at 08:30

## 2024-12-17 RX ADMIN — FAMOTIDINE 20 MG: 20 TABLET ORAL at 08:30

## 2024-12-17 RX ADMIN — CYANOCOBALAMIN TAB 1000 MCG 1000 MCG: 1000 TAB at 08:30

## 2024-12-17 RX ADMIN — FAMOTIDINE 20 MG: 20 TABLET ORAL at 17:19

## 2024-12-17 RX ADMIN — SENNOSIDES AND DOCUSATE SODIUM 2 TABLET: 50; 8.6 TABLET ORAL at 17:19

## 2024-12-17 NOTE — PLAN OF CARE
Problem: Ineffective Coping  Goal: Participates in unit activities  Description: Interventions:  - Provide therapeutic environment   - Provide required programming   - Redirect inappropriate behaviors   Outcome: Progressing     Problem: Depression  Goal: Treatment Goal: Demonstrate behavioral control of depressive symptoms, verbalize feelings of improved mood/affect, and adopt new coping skills prior to discharge  Outcome: Progressing  Goal: Verbalize thoughts and feelings  Description: Interventions:  - Assess and re-assess patient's level of risk   - Engage patient in 1:1 interactions, daily, for a minimum of 15 minutes   - Encourage patient to express feelings, fears, frustrations, hopes   Outcome: Progressing  Goal: Refrain from harming self  Description: Interventions:  - Monitor patient closely, per order   - Supervise medication ingestion, monitor effects and side effects   Outcome: Progressing  Goal: Refrain from isolation  Description: Interventions:  - Develop a trusting relationship   - Encourage socialization   Outcome: Progressing     Problem: Anxiety  Goal: Anxiety is at manageable level  Description: Interventions:  - Assess and monitor patient's anxiety level.   - Monitor for signs and symptoms (heart palpitations, chest pain, shortness of breath, headaches, nausea, feeling jumpy, restlessness, irritable, apprehensive).   - Collaborate with interdisciplinary team and initiate plan and interventions as ordered.  - [place] patient to unit/surroundings  - Explain treatment plan  - Encourage participation in care  - Encourage verbalization of concerns/fears  - Identify coping mechanisms  - Assist in developing anxiety-reducing skills  - Administer/offer alternative therapies  - Limit or eliminate stimulants  Outcome: Progressing     Problem: Knowledge Deficit  Goal: Patient/family/caregiver demonstrates understanding of disease process, treatment plan, medications, and discharge  instructions  Description: Complete learning assessment and assess knowledge base.  Interventions:  - Provide teaching at level of understanding  - Provide teaching via preferred learning methods  Outcome: Progressing     Problem: SLEEP DISTURBANCE  Goal: Will exhibit normal sleeping pattern  Description: Interventions:  -  Assess the patients sleep pattern, noting recent changes  - Administer medication as ordered  - Decrease environmental stimuli, including noise, as appropriate during the night  - Encourage the patient to actively participate in unit groups and or exercise during the day to enhance ability to achieve adequate sleep at night  - Assess the patient, in the morning, encouraging a description of sleep experience  Outcome: Progressing

## 2024-12-17 NOTE — NURSING NOTE
Pt is clam and cooperative upon approach. Denies SI, HI, AH, and VH. Medication and meal compliant. Isolative to room unless in groups, denies any needs at this time.

## 2024-12-17 NOTE — PROGRESS NOTES
"Progress Note - Behavioral Health   Name: Franco Roberson 39 y.o. male I MRN: [id number]  Unit/Bed#: -02 I Date of Admission: 5/14/2024   Date of Service: 12/17/2024 I Hospital Day: 217     Assessment & Plan  MDD (major depressive disorder), recurrent severe, without psychosis (HCC)  Patient continues to be pending group home placement. Otherwise no clinical significant change.  Pt and SW able to have meeting w/ [facility] on 12/13. Awaiting further updates    Abilify 5 mg daily as mood adjunct   Zoloft 150 mg daily for depression and anxiety  Continue to encourage participation in group therapy, milieu therapy and occupational therapy.  Continue to assess for side effects of medications.  Continue collaboration with [facility] for medical co-morbidities as indicated.  Continue discussion with CM/SW to assist with obtaining collateral, disposition planning, and the implementation of patient-centered individualized plan of care.  Continue frequent safety checks and vitals per unit protocol.    Risks, benefits and possible side effects of Medications: Risks, benefits, and possible side effects of medications have previously been explained. No new medications at this time.      Legal status: 201    Disposition: to be determined, pending SOAR application for potential group home placement. OT Cognitive Evaluation completed: \"Pt would benefit from discharge to a supportive environment that can provide checks for safety and compliance with IADLs. \"   Although patient's mood has stabilized, they are currently awaiting group home placement.  Their ability to recognize safety hazards take medications and participate in IADLs are significantly impaired by their cognitive deficits compounded by their chronic mental health needs and would be at risk for significant decompensation without the structure and support of a group home setting.      No associated orders from this encounter found during lookback period of 72 hours.  Autism " spectrum disorder  Continue supportive care    No associated orders from this encounter found during lookback period of 72 hours.        Plan     Recommended Treatment:    - Encourage early mobility and having a structured day  - Provide frequent re-orientation, and cognitive stimulation  - Ensure assistive devices are in proper working order (eye-glasses, hearing aids)  - Encourage adequate hydration, nutrition and monitor bowel movements  - Maintain sleep-wake cycle: Uninterrupted sleep time; low-level lighting at night  - Fall precaution  - f/u [facility] recs regarding the medical problems   - Continue medication titration and treatment plan; adjust medication to optimize treatment response and as clinically indicated. .   - Observation: routine            - VS: as per unit protocol  - Diet: Regular diet  - Encourage group attendance and milieu therapy  - Dispo: To be determined     Scheduled medications:  Current Facility-Administered Medications   Medication Dose Route Frequency Provider Last Rate    acetaminophen  650 mg Oral Q6H PRN Basilio Brown MD      acetaminophen  650 mg Oral Q4H PRN Basilio Brown MD      acetaminophen  975 mg Oral Q6H PRN Basilio Brown MD      aluminum-magnesium hydroxide-simethicone  30 mL Oral Q4H PRN Basilio Brown MD      ARIPiprazole  5 mg Oral Daily Basilio Panda MD      Artificial Tears  1 drop Both Eyes Q3H PRN Basilio Brown MD      atorvastatin  10 mg Oral Daily With Dinner WALLY Baptiste      benztropine  1 mg Intramuscular Q4H PRN Max 6/day Basilio Brown MD      benztropine  1 mg Oral Q4H PRN Max 6/day Basilio Brown MD      Cholecalciferol  2,500 Units Oral Daily Dustin Mcallister MD      cyanocobalamin  1,000 mcg Oral Daily WALLY Baptiste      Diclofenac Sodium  2 g Topical 4x Daily PRN WALLY Baptiste      hydrOXYzine HCL  50 mg Oral Q6H PRN Max 4/day Basilio Brown MD      Or     diphenhydrAMINE  50 mg Intramuscular Q6H PRN Basilio Brown MD      diphenhydrAMINE-zinc acetate   Topical Daily PRN aRj Guerrero MD      famotidine  20 mg Oral BID Laura Romegrady Ashford, WALLY      hydrOXYzine HCL  100 mg Oral Q6H PRN Max 4/day Basilio Brown MD      Or    LORazepam  2 mg Intramuscular Q6H PRN Basilio Brown MD      hydrOXYzine HCL  25 mg Oral Q6H PRN Max 4/day Basilio Brown MD      levothyroxine  37.5 mcg Oral Early Morning Laura Amber Ashford, WALLY      melatonin  6 mg Oral HS PRN WALLY Gomez      methocarbamol  500 mg Oral Q6H PRN LauraWALLY Tony      OLANZapine  5 mg Oral Q4H PRN Max 3/day Basilio Brown MD      Or    OLANZapine  2.5 mg Intramuscular Q4H PRN Max 3/day Basilio Brown MD      OLANZapine  5 mg Oral Q3H PRN Max 3/day Basilio Brown MD      Or    OLANZapine  5 mg Intramuscular Q3H PRN Max 3/day Basilio Brown MD      OLANZapine  2.5 mg Oral Q4H PRN Max 6/day Basilio Brown MD      polyethylene glycol  17 g Oral Daily PRN Basilio Brown MD      propranolol  10 mg Oral Q8H PRN Basilio Brown MD      senna-docusate sodium  2 tablet Oral After Dinner Dustin Mcallister MD      sertraline  150 mg Oral Daily WALLY Gomez      traZODone  50 mg Oral HS PRN Basilio Brown MD        PRN:    acetaminophen    acetaminophen    acetaminophen    aluminum-magnesium hydroxide-simethicone    Artificial Tears    benztropine    benztropine    Diclofenac Sodium    hydrOXYzine HCL **OR** diphenhydrAMINE    diphenhydrAMINE-zinc acetate    hydrOXYzine HCL **OR** LORazepam    hydrOXYzine HCL    melatonin    methocarbamol    OLANZapine **OR** OLANZapine    OLANZapine **OR** OLANZapine    OLANZapine    polyethylene glycol    propranolol    traZODone       Subjective     Patient was visited on unit for continuing care; chart reviewed and discussed with multidisciplinary treatment team.  On approach, the  "patient was calm and cooperative. Denied any changes in mood, appetite, and energy level.  Seen walking the halls this morning for exercise.  No acute complaints or concerns at this time.  No problem initiating and maintaining sleep.  Denied A/VH currently.  Denied SI/HI, intent or plan upon direct inquiry at this time.  Social work is still waiting for confirmation from Social Security regarding status of patient's account.    Patient continues to be visible in the milieu and interacts with staff and peers. No reports of aggression or self-injurious behavior on unit. No PRN medications used in the past 24 hours.    Patient accepted all offered medications and no adverse effects of medications noted or reported.    Objective    Current Mental Status Evaluation:  Mental Status Exam  Appearance: casually dressed, consistent with stated age  Motor: no psychomotor retardation, no gait abnormalities  Behavior: cooperative, answers questions appropriately  Speech: soft, normal rhythm  Mood: \"fine\"  Affect: blunted  Thought Process: concrete  Thought Content: denies delusions or paranoia  Risk Potential: denies suicidal ideation, plan, or intent. Denies homicidal ideation  Perceptions: denies auditory hallucinations, denies visual hallucinations,   Sensorium: Oriented to person, place, time, and situation  Cognition: cognitive ability appears intact but was not quantitatively tested  Consciousness: alert and awake  Attention: intact, able to focus without difficulty  Insight: limited  Judgement: limited            Vital signs in last 24 hours:    Temp:  [97.2 °F (36.2 °C)] 97.2 °F (36.2 °C)  HR:  [58-75] 58  BP: (113-127)/(68-73) 113/73  Resp:  [16-17] 17  SpO2:  [96 %-97 %] 97 %  O2 Device: None (Room air)    Psychiatric Review of Systems:  Medication adverse effects: none  Sleep: unchanged  Appetite: unchanged  Behaviors over the past 24 hours: unchanged    Laboratory results:    I have personally reviewed all pertinent " laboratory/tests results  No results found for this or any previous visit (from the past 48 hours).       Progress Toward Goals & Illness Status:   progressing, attends groups, participates in milieu therapy, mood is stabilizing, discharge planning, placement pending    Patient is not at goal. They are not yet ready for discharge. The patient's condition currently requires active psychopharmacological medication management, interdisciplinary coordination with case management, and the utilization of adjunctive milieu and group therapy to augment psychopharmacological efficacy. The patient's risk of morbidity, and progression or decompensation of psychiatric disease, is higher without this current treatment.     Next of Kin  Extended Emergency Contact Information  Primary Emergency Contact: Lois Roberson  Address: [address] [place] [address] [place]           [address] [place]  Home Phone: [phone]  Relation: Mother    Counseling / Coordination of Care  Patient's progress discussed with staff in treatment team meeting.  Medications, treatment progress and treatment plan reviewed with patient.  Medication education provided to patient.  Educated on importance of medication and treatment compliance.  Supportive therapy provided to patient.  Cognitive techniques utilized during the session.  Reassurance and supportive therapy provided.  Reoriented to reality and reassured.  Encouraged participation in milieu and group therapy on the unit.  Crisis/safety plan discussed with patient.       Dustin Mcallister MD  Attending Psychiatrist   [facility]      This note has been constructed using a voice recognition system. There may be translation, syntax, or grammatical errors. If you have any questions, please contact the dictating provider.

## 2024-12-17 NOTE — NURSING NOTE
Pt pleasant and cooperative. Brightens upon approach. Visible walking the halls periodically, social with peers and staff. Denies SI/HI/AH/VH. Medication and meal compliant. Verbalizes needs.

## 2024-12-17 NOTE — PROGRESS NOTES
12/17/24 0843   Team Meeting   Meeting Type Daily Rounds   Team Members Present   Team Members Present Physician;;Nurse   Physician Team Member Ary   [facility] Noa   Patient/Family Present   Patient Present No   Patient's Family Present No     Pt is medication and meal compliant. Pt denies SI/HI/AVH. Pt is social with select peers. Pt is calm and compliant.

## 2024-12-17 NOTE — NURSING NOTE
"PT observed in the room withdrawn to self. PT denies any psych symptoms and no depression  nor anxiety reported.  PT stated \"just the waiting game now but I am fine\", appears comfortable with no concerns upon assessment. No scheduled HS meds at this time.   "

## 2024-12-18 PROCEDURE — 99232 SBSQ HOSP IP/OBS MODERATE 35: CPT | Performed by: PSYCHIATRY & NEUROLOGY

## 2024-12-18 RX ADMIN — ATORVASTATIN CALCIUM 10 MG: 10 TABLET, FILM COATED ORAL at 17:42

## 2024-12-18 RX ADMIN — SENNOSIDES AND DOCUSATE SODIUM 2 TABLET: 50; 8.6 TABLET ORAL at 17:43

## 2024-12-18 RX ADMIN — SERTRALINE HYDROCHLORIDE 150 MG: 100 TABLET ORAL at 08:42

## 2024-12-18 RX ADMIN — FAMOTIDINE 20 MG: 20 TABLET ORAL at 17:43

## 2024-12-18 RX ADMIN — FAMOTIDINE 20 MG: 20 TABLET ORAL at 08:42

## 2024-12-18 RX ADMIN — CYANOCOBALAMIN TAB 1000 MCG 1000 MCG: 1000 TAB at 08:42

## 2024-12-18 RX ADMIN — Medication 2500 UNITS: at 08:42

## 2024-12-18 RX ADMIN — LEVOTHYROXINE SODIUM 37.5 MCG: 125 TABLET ORAL at 06:34

## 2024-12-18 RX ADMIN — ARIPIPRAZOLE 5 MG: 5 TABLET ORAL at 08:42

## 2024-12-18 NOTE — ASSESSMENT & PLAN NOTE
"Patient continues to be pending group home placement. Otherwise no clinical significant change.  Pt and SW able to have meeting w/ [facility] on 12/13. Awaiting further updates.    Abilify 5 mg daily as mood adjunct   Zoloft 150 mg daily for depression and anxiety  Continue to encourage participation in group therapy, milieu therapy and occupational therapy.  Continue to assess for side effects of medications.  Continue collaboration with [facility] for medical co-morbidities as indicated.  Continue discussion with CM/SW to assist with obtaining collateral, disposition planning, and the implementation of patient-centered individualized plan of care.  Continue frequent safety checks and vitals per unit protocol.    Risks, benefits and possible side effects of Medications: Risks, benefits, and possible side effects of medications have previously been explained. No new medications at this time.      Legal status: 201    Disposition: to be determined, pending SOAR application for potential group home placement. OT Cognitive Evaluation completed: \"Pt would benefit from discharge to a supportive environment that can provide checks for safety and compliance with IADLs. \"   Although patient's mood has stabilized, they are currently awaiting group home placement.  Their ability to recognize safety hazards take medications and participate in IADLs are significantly impaired by their cognitive deficits compounded by their chronic mental health needs and would be at risk for significant decompensation without the structure and support of a group home setting.      No associated orders from this encounter found during lookback period of 72 hours.  "

## 2024-12-18 NOTE — NURSING NOTE
Pt is visible on the unit and able to make his needs appropriately known. ADL's encouraged. Pt denies SI/HI/AH/VH at this time and reports he slept pretty good the previous night. Pt encouraged to attend groups and he states he plans on going to some later but does not want to attend the current art group. Pt hopeful for discharge. Denies any unmet needs or complaints at this time.

## 2024-12-18 NOTE — NURSING NOTE
PT observed visible in the unit, cooperative and calm upon approach.  Sitting in the patient's lounge during snack.  No scheduled meds at this time.

## 2024-12-18 NOTE — PROGRESS NOTES
12/18/24 0844   Team Meeting   Meeting Type Daily Rounds   Team Members Present   Team Members Present Physician;Nurse;   Physician Team Member Ary   Nursing Team Member [facility] Management Team Member Noa   Patient/Family Present   Patient Present No   Patient's Family Present No     Pt is medication and meal compliant. Pt denies SI/HI/AVH. Pt's discharge is pending placement.

## 2024-12-18 NOTE — PROGRESS NOTES
"Progress Note - Behavioral Health   Name: Franco Roberson 39 y.o. male I MRN: [id number]  Unit/Bed#: -02 I Date of Admission: 5/14/2024   Date of Service: 12/18/2024 I Hospital Day: 218     Assessment & Plan  MDD (major depressive disorder), recurrent severe, without psychosis (HCC)  Patient continues to be pending group home placement. Otherwise no clinical significant change.  Pt and SW able to have meeting w/ [facility] on 12/13. Awaiting further updates.    Abilify 5 mg daily as mood adjunct   Zoloft 150 mg daily for depression and anxiety  Continue to encourage participation in group therapy, milieu therapy and occupational therapy.  Continue to assess for side effects of medications.  Continue collaboration with [facility] for medical co-morbidities as indicated.  Continue discussion with CM/SW to assist with obtaining collateral, disposition planning, and the implementation of patient-centered individualized plan of care.  Continue frequent safety checks and vitals per unit protocol.    Risks, benefits and possible side effects of Medications: Risks, benefits, and possible side effects of medications have previously been explained. No new medications at this time.      Legal status: 201    Disposition: to be determined, pending SOAR application for potential group home placement. OT Cognitive Evaluation completed: \"Pt would benefit from discharge to a supportive environment that can provide checks for safety and compliance with IADLs. \"   Although patient's mood has stabilized, they are currently awaiting group home placement.  Their ability to recognize safety hazards take medications and participate in IADLs are significantly impaired by their cognitive deficits compounded by their chronic mental health needs and would be at risk for significant decompensation without the structure and support of a group home setting.      No associated orders from this encounter found during lookback period of 72 hours.  Autism " spectrum disorder  Continue supportive care    No associated orders from this encounter found during lookback period of 72 hours.        Plan     Recommended Treatment:    - Encourage early mobility and having a structured day  - Provide frequent re-orientation, and cognitive stimulation  - Ensure assistive devices are in proper working order (eye-glasses, hearing aids)  - Encourage adequate hydration, nutrition and monitor bowel movements  - Maintain sleep-wake cycle: Uninterrupted sleep time; low-level lighting at night  - Fall precaution  - f/u [facility] recs regarding the medical problems   - Continue medication titration and treatment plan; adjust medication to optimize treatment response and as clinically indicated. .   - Observation: routine            - VS: as per unit protocol  - Diet: Regular diet  - Encourage group attendance and milieu therapy  - Dispo: To be determined     Scheduled medications:  Current Facility-Administered Medications   Medication Dose Route Frequency Provider Last Rate    acetaminophen  650 mg Oral Q6H PRN Basilio Brown MD      acetaminophen  650 mg Oral Q4H PRN Basilio Brown MD      acetaminophen  975 mg Oral Q6H PRN Basilio Brown MD      aluminum-magnesium hydroxide-simethicone  30 mL Oral Q4H PRN Basilio Brown MD      ARIPiprazole  5 mg Oral Daily Basilio Panda MD      Artificial Tears  1 drop Both Eyes Q3H PRN Basilio Brown MD      atorvastatin  10 mg Oral Daily With Dinner WALLY Baptiste      benztropine  1 mg Intramuscular Q4H PRN Max 6/day Basilio Brown MD      benztropine  1 mg Oral Q4H PRN Max 6/day Basilio Brown MD      Cholecalciferol  2,500 Units Oral Daily Dustin Mcallister MD      cyanocobalamin  1,000 mcg Oral Daily WALLY Baptiste      Diclofenac Sodium  2 g Topical 4x Daily PRN WALLY Baptiste      hydrOXYzine HCL  50 mg Oral Q6H PRN Max 4/day Basilio Brown MD      Or     diphenhydrAMINE  50 mg Intramuscular Q6H PRN Basilio Brown MD      diphenhydrAMINE-zinc acetate   Topical Daily PRN Raj Guerrero MD      famotidine  20 mg Oral BID Laura Romegrady Ashford, WALLY      hydrOXYzine HCL  100 mg Oral Q6H PRN Max 4/day Basilio Brown MD      Or    LORazepam  2 mg Intramuscular Q6H PRN Basilio Brown MD      hydrOXYzine HCL  25 mg Oral Q6H PRN Max 4/day Basilio Brown MD      levothyroxine  37.5 mcg Oral Early Morning Laura Amber Ashford, WALLY      melatonin  6 mg Oral HS PRN WALLY Gomez      methocarbamol  500 mg Oral Q6H PRN LauraWALLY Tony      OLANZapine  5 mg Oral Q4H PRN Max 3/day Basilio Brown MD      Or    OLANZapine  2.5 mg Intramuscular Q4H PRN Max 3/day Basilio Brown MD      OLANZapine  5 mg Oral Q3H PRN Max 3/day Basilio Brown MD      Or    OLANZapine  5 mg Intramuscular Q3H PRN Max 3/day Basilio Brown MD      OLANZapine  2.5 mg Oral Q4H PRN Max 6/day Basilio Brown MD      polyethylene glycol  17 g Oral Daily PRN Basilio Brown MD      propranolol  10 mg Oral Q8H PRN Basilio Brown MD      senna-docusate sodium  2 tablet Oral After Dinner Dustin Mcallister MD      sertraline  150 mg Oral Daily WALLY Gomez      traZODone  50 mg Oral HS PRN Basilio Brown MD        PRN:    acetaminophen    acetaminophen    acetaminophen    aluminum-magnesium hydroxide-simethicone    Artificial Tears    benztropine    benztropine    Diclofenac Sodium    hydrOXYzine HCL **OR** diphenhydrAMINE    diphenhydrAMINE-zinc acetate    hydrOXYzine HCL **OR** LORazepam    hydrOXYzine HCL    melatonin    methocarbamol    OLANZapine **OR** OLANZapine    OLANZapine **OR** OLANZapine    OLANZapine    polyethylene glycol    propranolol    traZODone       Subjective     Patient was visited on unit for continuing care; chart reviewed and discussed with multidisciplinary treatment team.  On approach, the  "patient was calm and cooperative. Denied any changes in mood, appetite, and energy level. No physical complaints or concerns No problem initiating and maintaining sleep.  Denied A/VH currently.  Denied SI/HI, intent or plan upon direct inquiry at this time.    Patient continues to be visible in the milieu and interacts with staff and peers. No reports of aggression or self-injurious behavior on unit. No PRN medications used in the past 24 hours.    Patient accepted all offered medications and no adverse effects of medications noted or reported.    Objective    Current Mental Status Evaluation:  Appearance: casually dressed, consistent with stated age  Motor: no psychomotor retardation, no gait abnormalities  Behavior: cooperative, answers questions appropriately  Speech: soft, normal rhythm  Mood: \"good\"  Affect: constricted, depressed-appearing  Thought Process: linear and goal-oriented  Thought Content: denies delusions  Risk Potential: denies suicidal ideation, plan, or intent. Denies homicidal ideation  Perceptions: denies auditory hallucinations, denies visual hallucinations,   Sensorium: Oriented to person, place, time, and situation  Cognition: cognitive ability appears intact but was not quantitatively tested  Consciousness: alert and awake  Attention: intact, able to focus without difficulty  Insight: limited  Judgement: limited            Vital signs in last 24 hours:    Temp:  [96.4 °F (35.8 °C)-97 °F (36.1 °C)] 97 °F (36.1 °C)  HR:  [72-75] 72  BP: (115-120)/(71-75) 115/71  Resp:  [16] 16  SpO2:  [97 %] 97 %  O2 Device: None (Room air)    Psychiatric Review of Systems:  Medication adverse effects: none  Sleep: unchanged  Appetite: unchanged  Behaviors over the past 24 hours: unchanged    Laboratory results:    I have personally reviewed all pertinent laboratory/tests results  No results found for this or any previous visit (from the past 48 hours).       Progress Toward Goals & Illness Status: "   progressing, mood is stabilizing, discharge planning, placement pending    Patient is not at goal. They are not yet ready for discharge. The patient's condition currently requires active psychopharmacological medication management, interdisciplinary coordination with case management, and the utilization of adjunctive milieu and group therapy to augment psychopharmacological efficacy. The patient's risk of morbidity, and progression or decompensation of psychiatric disease, is higher without this current treatment.     Next of Kin  Extended Emergency Contact Information  Primary Emergency Contact: Lois Roberson  Address: [address] [place] [address] [place]           [address]  Home Phone: [phone]  Relation: Mother    Counseling / Coordination of Care  Patient's progress discussed with staff in treatment team meeting.  Medications, treatment progress and treatment plan reviewed with patient.  Medication education provided to patient.  Educated on importance of medication and treatment compliance.  Supportive therapy provided to patient.  Cognitive techniques utilized during the session.  Reassurance and supportive therapy provided.  Reoriented to reality and reassured.  Encouraged participation in milieu and group therapy on the unit.  Crisis/safety plan discussed with patient.       Dustin Mcallister MD  Attending Psychiatrist   [facility]      This note has been constructed using a voice recognition system. There may be translation, syntax, or grammatical errors. If you have any questions, please contact the dictating provider.

## 2024-12-18 NOTE — NURSING NOTE
"Discussed potential haircut with patient but pt stated \"no thanks I am going to grow out a bit thank you\"  "

## 2024-12-18 NOTE — PLAN OF CARE
Problem: Depression  Goal: Treatment Goal: Demonstrate behavioral control of depressive symptoms, verbalize feelings of improved mood/affect, and adopt new coping skills prior to discharge  Outcome: Progressing  Goal: Verbalize thoughts and feelings  Description: Interventions:  - Assess and re-assess patient's level of risk   - Engage patient in 1:1 interactions, daily, for a minimum of 15 minutes   - Encourage patient to express feelings, fears, frustrations, hopes   Outcome: Progressing  Goal: Refrain from harming self  Description: Interventions:  - Monitor patient closely, per order   - Supervise medication ingestion, monitor effects and side effects   Outcome: Progressing  Goal: Refrain from isolation  Description: Interventions:  - Develop a trusting relationship   - Encourage socialization   Outcome: Progressing     Problem: Anxiety  Goal: Anxiety is at manageable level  Description: Interventions:  - Assess and monitor patient's anxiety level.   - Monitor for signs and symptoms (heart palpitations, chest pain, shortness of breath, headaches, nausea, feeling jumpy, restlessness, irritable, apprehensive).   - Collaborate with interdisciplinary team and initiate plan and interventions as ordered.  - [place] patient to unit/surroundings  - Explain treatment plan  - Encourage participation in care  - Encourage verbalization of concerns/fears  - Identify coping mechanisms  - Assist in developing anxiety-reducing skills  - Administer/offer alternative therapies  - Limit or eliminate stimulants  Outcome: Progressing     Problem: DISCHARGE PLANNING - CARE MANAGEMENT  Goal: Discharge to post-acute care or home with appropriate resources  Description: INTERVENTIONS:  - Conduct assessment to determine patient/family and health care team treatment goals, and need for post-acute services based on payer coverage, community resources, and patient preferences, and barriers to discharge  - Address psychosocial, clinical,  and financial barriers to discharge as identified in assessment in conjunction with the patient/family and health care team  - Arrange appropriate level of post-acute services according to patient’s   needs and preference and payer coverage in collaboration with the physician and health care team  - Communicate with and update the patient/family, physician, and health care team regarding progress on the discharge plan  - Arrange appropriate transportation to post-acute venues  Outcome: Progressing     Problem: Knowledge Deficit  Goal: Patient/family/caregiver demonstrates understanding of disease process, treatment plan, medications, and discharge instructions  Description: Complete learning assessment and assess knowledge base.  Interventions:  - Provide teaching at level of understanding  - Provide teaching via preferred learning methods  Outcome: Progressing     Problem: SLEEP DISTURBANCE  Goal: Will exhibit normal sleeping pattern  Description: Interventions:  -  Assess the patients sleep pattern, noting recent changes  - Administer medication as ordered  - Decrease environmental stimuli, including noise, as appropriate during the night  - Encourage the patient to actively participate in unit groups and or exercise during the day to enhance ability to achieve adequate sleep at night  - Assess the patient, in the morning, encouraging a description of sleep experience  Outcome: Progressing      Sarai Duval  (RN)  2019 22:56:04

## 2024-12-19 PROCEDURE — 99232 SBSQ HOSP IP/OBS MODERATE 35: CPT | Performed by: PSYCHIATRY & NEUROLOGY

## 2024-12-19 RX ADMIN — SERTRALINE HYDROCHLORIDE 150 MG: 100 TABLET ORAL at 08:32

## 2024-12-19 RX ADMIN — ARIPIPRAZOLE 5 MG: 5 TABLET ORAL at 08:31

## 2024-12-19 RX ADMIN — FAMOTIDINE 20 MG: 20 TABLET ORAL at 08:32

## 2024-12-19 RX ADMIN — CYANOCOBALAMIN TAB 1000 MCG 1000 MCG: 1000 TAB at 08:31

## 2024-12-19 RX ADMIN — LEVOTHYROXINE SODIUM 37.5 MCG: 125 TABLET ORAL at 06:35

## 2024-12-19 RX ADMIN — SENNOSIDES AND DOCUSATE SODIUM 2 TABLET: 50; 8.6 TABLET ORAL at 17:52

## 2024-12-19 RX ADMIN — ATORVASTATIN CALCIUM 10 MG: 10 TABLET, FILM COATED ORAL at 17:52

## 2024-12-19 RX ADMIN — FAMOTIDINE 20 MG: 20 TABLET ORAL at 17:52

## 2024-12-19 RX ADMIN — Medication 2500 UNITS: at 08:31

## 2024-12-19 NOTE — PROGRESS NOTES
"Progress Note - Behavioral Health   Name: Franco Roberson 39 y.o. male I MRN: [id number]  Unit/Bed#: -02 I Date of Admission: 5/14/2024   Date of Service: 12/19/2024 I Hospital Day: 219     Assessment & Plan  MDD (major depressive disorder), recurrent severe, without psychosis (HCC)  Patient continues to be pending group home placement. Otherwise no clinical significant change.  Pt and SW able to have meeting w/ [facility] on 12/13. Waiting for follow up [facility].    Abilify 5 mg daily as mood adjunct   Zoloft 150 mg daily for depression and anxiety  Continue to encourage participation in group therapy, milieu therapy and occupational therapy.  Continue to assess for side effects of medications.  Continue collaboration with [facility] for medical co-morbidities as indicated.  Continue discussion with CM/SW to assist with obtaining collateral, disposition planning, and the implementation of patient-centered individualized plan of care.  Continue frequent safety checks and vitals per unit protocol.    Risks, benefits and possible side effects of Medications: Risks, benefits, and possible side effects of medications have previously been explained. No new medications at this time.      Legal status: 201    Disposition: to be determined, pending SOAR application for potential group home placement. OT Cognitive Evaluation completed: \"Pt would benefit from discharge to a supportive environment that can provide checks for safety and compliance with IADLs. \"   Although patient's mood has stabilized, they are currently awaiting group home placement.  Their ability to recognize safety hazards take medications and participate in IADLs are significantly impaired by their cognitive deficits compounded by their chronic mental health needs and would be at risk for significant decompensation without the structure and support of a group home setting.      No associated orders from this encounter found during lookback period of 72 " hours.  Autism spectrum disorder  Continue supportive care    No associated orders from this encounter found during lookback period of 72 hours.        Plan     Recommended Treatment:    - Encourage early mobility and having a structured day  - Provide frequent re-orientation, and cognitive stimulation  - Ensure assistive devices are in proper working order (eye-glasses, hearing aids)  - Encourage adequate hydration, nutrition and monitor bowel movements  - Maintain sleep-wake cycle: Uninterrupted sleep time; low-level lighting at night  - Fall precaution  - f/u [facility] recs regarding the medical problems   - Continue medication titration and treatment plan; adjust medication to optimize treatment response and as clinically indicated. .   - Observation: routine            - VS: as per unit protocol  - Diet: Regular diet  - Encourage group attendance and milieu therapy  - Dispo: To be determined     Scheduled medications:  Current Facility-Administered Medications   Medication Dose Route Frequency Provider Last Rate    acetaminophen  650 mg Oral Q6H PRN Basilio Brown MD      acetaminophen  650 mg Oral Q4H PRN Basilio Brown MD      acetaminophen  975 mg Oral Q6H PRN Basilio Brown MD      aluminum-magnesium hydroxide-simethicone  30 mL Oral Q4H PRN Basilio Brown MD      ARIPiprazole  5 mg Oral Daily Basilio Panda MD      Artificial Tears  1 drop Both Eyes Q3H PRN Basilio Brown MD      atorvastatin  10 mg Oral Daily With Dinner WALLY Baptiste      benztropine  1 mg Intramuscular Q4H PRN Max 6/day Basilio Brown MD      benztropine  1 mg Oral Q4H PRN Max 6/day Basilio Brown MD      Cholecalciferol  2,500 Units Oral Daily Dustin Mcallister MD      cyanocobalamin  1,000 mcg Oral Daily WALLY Baptiste      Diclofenac Sodium  2 g Topical 4x Daily PRN WALLY Baptiste      hydrOXYzine HCL  50 mg Oral Q6H PRN Max 4/day Basilio Brown MD       Or    diphenhydrAMINE  50 mg Intramuscular Q6H PRN Basilio Brown MD      diphenhydrAMINE-zinc acetate   Topical Daily PRN Raj Guerrero MD      famotidine  20 mg Oral BID Laura Amber Ashford, WALLY      hydrOXYzine HCL  100 mg Oral Q6H PRN Max 4/day Basilio Brown MD      Or    LORazepam  2 mg Intramuscular Q6H PRN Basilio Brown MD      hydrOXYzine HCL  25 mg Oral Q6H PRN Max 4/day Basilio Brown MD      levothyroxine  37.5 mcg Oral Early Morning Laura Ashford, WALLY      melatonin  6 mg Oral HS PRN WALLY Gomez      methocarbamol  500 mg Oral Q6H PRN WALLY Baptiste      OLANZapine  5 mg Oral Q4H PRN Max 3/day Basilio Brown MD      Or    OLANZapine  2.5 mg Intramuscular Q4H PRN Max 3/day Basilio Brown MD      OLANZapine  5 mg Oral Q3H PRN Max 3/day Basilio Brown MD      Or    OLANZapine  5 mg Intramuscular Q3H PRN Max 3/day Basilio Brown MD      OLANZapine  2.5 mg Oral Q4H PRN Max 6/day Basilio Brown MD      polyethylene glycol  17 g Oral Daily PRN Basilio Brown MD      propranolol  10 mg Oral Q8H PRN Basilio Brown MD      senna-docusate sodium  2 tablet Oral After Dinner Dustin Mcallister MD      sertraline  150 mg Oral Daily WALLY Gomez      traZODone  50 mg Oral HS PRN Basilio Brown MD        PRN:    acetaminophen    acetaminophen    acetaminophen    aluminum-magnesium hydroxide-simethicone    Artificial Tears    benztropine    benztropine    Diclofenac Sodium    hydrOXYzine HCL **OR** diphenhydrAMINE    diphenhydrAMINE-zinc acetate    hydrOXYzine HCL **OR** LORazepam    hydrOXYzine HCL    melatonin    methocarbamol    OLANZapine **OR** OLANZapine    OLANZapine **OR** OLANZapine    OLANZapine    polyethylene glycol    propranolol    traZODone       Subjective     Patient was visited on unit for continuing care; chart reviewed and discussed with multidisciplinary treatment team.  On approach,  "the patient was calm and cooperative. Denied any changes in mood, appetite, and energy level.  Patient declines any interest in haircut at this time.  Patient is aware that he can approach nursing to request that should he want to.  He otherwise has no acute complaints or concerns. No problem initiating and maintaining sleep.  Denied A/VH currently.  Denied SI/HI, intent or plan upon direct inquiry at this time.    Patient continues to be visible in the milieu and interacts with staff and peers. No reports of aggression or self-injurious behavior on unit. No PRN medications used in the past 24 hours.    Patient accepted all offered medications and no adverse effects of medications noted or reported.    Objective    Current Mental Status Evaluation:  Mental Status Exam  Appearance: casually dressed, consistent with stated age  Motor: no psychomotor retardation, no gait abnormalities  Behavior: cooperative, answers questions appropriately  Speech: soft, normal rhythm  Mood: \"doing good\"  Affect: constricted  Thought Process: linear and goal-oriented  Thought Content: denies delusions or paranoia  Risk Potential: denies suicidal ideation, plan, or intent. Denies homicidal ideation  Perceptions: denies auditory hallucinations, denies visual hallucinations,   Sensorium: Oriented to person, place, time, and situation  Cognition: cognitive ability appears intact but was not quantitatively tested  Consciousness: alert and awake  Attention: intact, able to focus without difficulty  Insight: limited  Judgement: limited            Vital signs in last 24 hours:    Temp:  [97 °F (36.1 °C)-97.5 °F (36.4 °C)] 97.5 °F (36.4 °C)  HR:  [63-78] 63  BP: (114-117)/(67-79) 117/79  Resp:  [16] 16  SpO2:  [97 %-100 %] 100 %  O2 Device: None (Room air)    Psychiatric Review of Systems:  Medication adverse effects: none  Sleep: unchanged  Appetite: unchanged  Behaviors over the past 24 hours: unchanged    Laboratory results:    I have " personally reviewed all pertinent laboratory/tests results  No results found for this or any previous visit (from the past 48 hours).       Progress Toward Goals & Illness Status:   progressing, attends groups, participates in milieu therapy, mood is stabilizing    Patient is not at goal. They are not yet ready for discharge. The patient's condition currently requires active psychopharmacological medication management, interdisciplinary coordination with case management, and the utilization of adjunctive milieu and group therapy to augment psychopharmacological efficacy. The patient's risk of morbidity, and progression or decompensation of psychiatric disease, is higher without this current treatment.     Next of Kin  Extended Emergency Contact Information  Primary Emergency Contact: Losi Roberson  Address: [address] [place] [address] [place]           [address] [place]  Home Phone: [phone]  Relation: Mother    Counseling / Coordination of Care  Patient's progress discussed with staff in treatment team meeting.  Medications, treatment progress and treatment plan reviewed with patient.  Medication education provided to patient.  Educated on importance of medication and treatment compliance.  Supportive therapy provided to patient.  Cognitive techniques utilized during the session.  Reassurance and supportive therapy provided.  Reoriented to reality and reassured.  Encouraged participation in milieu and group therapy on the unit.  Crisis/safety plan discussed with patient.       Dustin Mcallister MD  Attending Psychiatrist   [facility]      This note has been constructed using a voice recognition system. There may be translation, syntax, or grammatical errors. If you have any questions, please contact the dictating provider.

## 2024-12-19 NOTE — PROGRESS NOTES
12/19/24 0841   Team Meeting   Meeting Type Daily Rounds   Team Members Present   Team Members Present Physician;Nurse;   Physician Team Member Ary   [facility] Suhas   Patient/Family Present   Patient Present No   Patient's Family Present No     Pt med/meal compliant. Visible on the unit, social with staff and peers. Pleasant, calm, cooperative. Denies symptoms. Discharge pending placement.

## 2024-12-19 NOTE — ASSESSMENT & PLAN NOTE
"Patient continues to be pending group home placement. Otherwise no clinical significant change.  [place] and SW [place] [facility] [place] [facility] [place].    Abilify 5 mg daily as mood adjunct   Zoloft 150 mg daily for depression and anxiety  Continue to encourage participation in group therapy, milieu therapy and occupational therapy.  Continue to assess for side effects of medications.  Continue collaboration with [facility] for medical co-morbidities as indicated.  Continue discussion with CM/SW to assist with obtaining collateral, disposition planning, and the implementation of patient-centered individualized plan of care.  Continue frequent safety checks and vitals per unit protocol.    Risks, benefits and possible side effects of Medications: Risks, benefits, and possible side effects of medications have previously been explained. No new medications at this time.      Legal status: 201    Disposition: to be determined, pending SOAR application for potential group home placement. OT Cognitive Evaluation completed: \"Pt would benefit from discharge to a supportive environment that can provide checks for safety and compliance with IADLs. \"   Although patient's mood has stabilized, they are currently awaiting group home placement.  Their ability to recognize safety hazards take medications and participate in IADLs are significantly impaired by their cognitive deficits compounded by their chronic mental health needs and would be at risk for significant decompensation without the structure and support of a group home setting.      No associated orders from this encounter found during lookback period of 72 hours.  "

## 2024-12-19 NOTE — PLAN OF CARE
Problem: Ineffective Coping  Goal: Participates in unit activities  Description: Interventions:  - Provide therapeutic environment   - Provide required programming   - Redirect inappropriate behaviors   Outcome: Progressing     Problem: Depression  Goal: Treatment Goal: Demonstrate behavioral control of depressive symptoms, verbalize feelings of improved mood/affect, and adopt new coping skills prior to discharge  Outcome: Progressing  Goal: Verbalize thoughts and feelings  Description: Interventions:  - Assess and re-assess patient's level of risk   - Engage patient in 1:1 interactions, daily, for a minimum of 15 minutes   - Encourage patient to express feelings, fears, frustrations, hopes   Outcome: Progressing  Goal: Refrain from harming self  Description: Interventions:  - Monitor patient closely, per order   - Supervise medication ingestion, monitor effects and side effects   Outcome: Progressing  Goal: Refrain from isolation  Description: Interventions:  - Develop a trusting relationship   - Encourage socialization   Outcome: Progressing     Problem: Anxiety  Goal: Anxiety is at manageable level  Description: Interventions:  - Assess and monitor patient's anxiety level.   - Monitor for signs and symptoms (heart palpitations, chest pain, shortness of breath, headaches, nausea, feeling jumpy, restlessness, irritable, apprehensive).   - Collaborate with interdisciplinary team and initiate plan and interventions as ordered.  - [place] patient to unit/surroundings  - Explain treatment plan  - Encourage participation in care  - Encourage verbalization of concerns/fears  - Identify coping mechanisms  - Assist in developing anxiety-reducing skills  - Administer/offer alternative therapies  - Limit or eliminate stimulants  Outcome: Progressing     Problem: DISCHARGE PLANNING - CARE MANAGEMENT  Goal: Discharge to post-acute care or home with appropriate resources  Description: INTERVENTIONS:  - Conduct assessment to  determine patient/family and health care team treatment goals, and need for post-acute services based on payer coverage, community resources, and patient preferences, and barriers to discharge  - Address psychosocial, clinical, and financial barriers to discharge as identified in assessment in conjunction with the patient/family and health care team  - Arrange appropriate level of post-acute services according to patient’s   needs and preference and payer coverage in collaboration with the physician and health care team  - Communicate with and update the patient/family, physician, and health care team regarding progress on the discharge plan  - Arrange appropriate transportation to post-acute venues  Outcome: Progressing     Problem: Knowledge Deficit  Goal: Patient/family/caregiver demonstrates understanding of disease process, treatment plan, medications, and discharge instructions  Description: Complete learning assessment and assess knowledge base.  Interventions:  - Provide teaching at level of understanding  - Provide teaching via preferred learning methods  Outcome: Progressing     Problem: SLEEP DISTURBANCE  Goal: Will exhibit normal sleeping pattern  Description: Interventions:  -  Assess the patients sleep pattern, noting recent changes  - Administer medication as ordered  - Decrease environmental stimuli, including noise, as appropriate during the night  - Encourage the patient to actively participate in unit groups and or exercise during the day to enhance ability to achieve adequate sleep at night  - Assess the patient, in the morning, encouraging a description of sleep experience  Outcome: Progressing

## 2024-12-19 NOTE — NURSING NOTE
Patient is in the community, observed pacing hallways and makes needs known. Patient denies all psych symptoms and is pleasant on approach. Q15 observation maintained.

## 2024-12-19 NOTE — NURSING NOTE
Pt attending some groups. Denies SI/HI/AH/VH and any anxiety or depression at this time. Medication and meal compliant. Denies any unmet needs or complaints.

## 2024-12-20 PROCEDURE — 99232 SBSQ HOSP IP/OBS MODERATE 35: CPT | Performed by: PSYCHIATRY & NEUROLOGY

## 2024-12-20 RX ADMIN — SENNOSIDES AND DOCUSATE SODIUM 2 TABLET: 50; 8.6 TABLET ORAL at 18:11

## 2024-12-20 RX ADMIN — FAMOTIDINE 20 MG: 20 TABLET ORAL at 18:11

## 2024-12-20 RX ADMIN — FAMOTIDINE 20 MG: 20 TABLET ORAL at 08:44

## 2024-12-20 RX ADMIN — ATORVASTATIN CALCIUM 10 MG: 10 TABLET, FILM COATED ORAL at 18:11

## 2024-12-20 RX ADMIN — ARIPIPRAZOLE 5 MG: 5 TABLET ORAL at 08:45

## 2024-12-20 RX ADMIN — Medication 2500 UNITS: at 08:45

## 2024-12-20 RX ADMIN — SERTRALINE HYDROCHLORIDE 150 MG: 100 TABLET ORAL at 08:44

## 2024-12-20 RX ADMIN — CYANOCOBALAMIN TAB 1000 MCG 1000 MCG: 1000 TAB at 08:45

## 2024-12-20 RX ADMIN — LEVOTHYROXINE SODIUM 37.5 MCG: 125 TABLET ORAL at 06:22

## 2024-12-20 NOTE — PROGRESS NOTES
"Progress Note - Behavioral Health   Name: Franco Roberson 39 y.o. male I MRN: [id number]  Unit/Bed#: -01 I Date of Admission: 5/14/2024   Date of Service: 12/20/2024 I Hospital Day: 220     Assessment & Plan  MDD (major depressive disorder), recurrent severe, without psychosis (HCC)  Patient continues to be pending group home placement. Otherwise no clinical significant change.  Pt and SW able to have meeting w/ [facility] on 12/13. Waiting for follow up from [facility] [place] mail.    Abilify 5 mg daily as mood adjunct   Zoloft 150 mg daily for depression and anxiety  Continue to encourage participation in group therapy, milieu therapy and occupational therapy.  Continue to assess for side effects of medications.  Continue collaboration with [facility] for medical co-morbidities as indicated.  Continue discussion with CM/SW to assist with obtaining collateral, disposition planning, and the implementation of patient-centered individualized plan of care.  Continue frequent safety checks and vitals per unit protocol.    Risks, benefits and possible side effects of Medications: Risks, benefits, and possible side effects of medications have previously been explained. No new medications at this time.      Legal status: 201    Disposition: to be determined, pending SOAR application for potential group home placement. OT Cognitive Evaluation completed: \"Pt would benefit from discharge to a supportive environment that can provide checks for safety and compliance with IADLs. \"   Although patient's mood has stabilized, they are currently awaiting group home placement.  Their ability to recognize safety hazards take medications and participate in IADLs are significantly impaired by their cognitive deficits compounded by their chronic mental health needs and would be at risk for significant decompensation without the structure and support of a group home setting.      No associated orders from this encounter found during lookback period of 72 " hours.  Autism spectrum disorder  Continue supportive care    No associated orders from this encounter found during lookback period of 72 hours.        Plan     Recommended Treatment:    - Encourage early mobility and having a structured day  - Provide frequent re-orientation, and cognitive stimulation  - Ensure assistive devices are in proper working order (eye-glasses, hearing aids)  - Encourage adequate hydration, nutrition and monitor bowel movements  - Maintain sleep-wake cycle: Uninterrupted sleep time; low-level lighting at night  - Fall precaution  - f/u [facility] recs regarding the medical problems   - Continue medication titration and treatment plan; adjust medication to optimize treatment response and as clinically indicated. .   - Observation: routine            - VS: as per unit protocol  - Diet: Regular diet  - Encourage group attendance and milieu therapy  - Dispo: To be determined     Scheduled medications:  Current Facility-Administered Medications   Medication Dose Route Frequency Provider Last Rate    acetaminophen  650 mg Oral Q6H PRN Basilio Brown MD      acetaminophen  650 mg Oral Q4H PRN Basilio Brown MD      acetaminophen  975 mg Oral Q6H PRN Basilio Brown MD      aluminum-magnesium hydroxide-simethicone  30 mL Oral Q4H PRN Basilio Brown MD      ARIPiprazole  5 mg Oral Daily Basilio Panda MD      Artificial Tears  1 drop Both Eyes Q3H PRN Basilio Brown MD      atorvastatin  10 mg Oral Daily With Dinner WALLY Baptiste      benztropine  1 mg Intramuscular Q4H PRN Max 6/day Basilio Brown MD      benztropine  1 mg Oral Q4H PRN Max 6/day Basilio Brown MD      Cholecalciferol  2,500 Units Oral Daily Dustin Mcallister MD      cyanocobalamin  1,000 mcg Oral Daily WALLY Baptiste      Diclofenac Sodium  2 g Topical 4x Daily PRN WALLY Baptiste      hydrOXYzine HCL  50 mg Oral Q6H PRN Max 4/day Basilio Brown MD       Or    diphenhydrAMINE  50 mg Intramuscular Q6H PRN Basilio Brown MD      diphenhydrAMINE-zinc acetate   Topical Daily PRN Raj Guerrero MD      famotidine  20 mg Oral BID Laura Amber Ashford, WALLY      hydrOXYzine HCL  100 mg Oral Q6H PRN Max 4/day Basilio Brown MD      Or    LORazepam  2 mg Intramuscular Q6H PRN Basilio Brown MD      hydrOXYzine HCL  25 mg Oral Q6H PRN Max 4/day Basilio Brown MD      levothyroxine  37.5 mcg Oral Early Morning Laura Ashford, WALLY      melatonin  6 mg Oral HS PRN WALLY Gomez      methocarbamol  500 mg Oral Q6H PRN WALLY Baptiste      OLANZapine  5 mg Oral Q4H PRN Max 3/day Basilio Brown MD      Or    OLANZapine  2.5 mg Intramuscular Q4H PRN Max 3/day Basilio Brown MD      OLANZapine  5 mg Oral Q3H PRN Max 3/day Basilio Brown MD      Or    OLANZapine  5 mg Intramuscular Q3H PRN Max 3/day Basilio Brown MD      OLANZapine  2.5 mg Oral Q4H PRN Max 6/day Basiilo Brown MD      polyethylene glycol  17 g Oral Daily PRN Basilio Brown MD      propranolol  10 mg Oral Q8H PRN Basilio Brown MD      senna-docusate sodium  2 tablet Oral After Dinner Dustin Mcallister MD      sertraline  150 mg Oral Daily WALLY Gomez      traZODone  50 mg Oral HS PRN Basilio Brown MD        PRN:    acetaminophen    acetaminophen    acetaminophen    aluminum-magnesium hydroxide-simethicone    Artificial Tears    benztropine    benztropine    Diclofenac Sodium    hydrOXYzine HCL **OR** diphenhydrAMINE    diphenhydrAMINE-zinc acetate    hydrOXYzine HCL **OR** LORazepam    hydrOXYzine HCL    melatonin    methocarbamol    OLANZapine **OR** OLANZapine    OLANZapine **OR** OLANZapine    OLANZapine    polyethylene glycol    propranolol    traZODone       Subjective     Patient was visited on unit for continuing care; chart reviewed and discussed with multidisciplinary treatment team.  On approach,  "the patient was calm and cooperative. Denied any changes in mood, appetite, and energy level. No problem initiating and maintaining sleep.  Denied A/VH currently.  Denied SI/HI, intent or plan upon direct inquiry at this time.    Patient continues to be visible in the milieu and interacts with staff and peers. No reports of aggression or self-injurious behavior on unit. No PRN medications used in the past 24 hours.    Patient accepted all offered medications and no adverse effects of medications noted or reported.    Objective    Current Mental Status Evaluation:  Appearance: casually dressed, consistent with stated age  Motor: no psychomotor retardation, no gait abnormalities  Behavior: cooperative, answers questions appropriately  Speech: soft, normal rhythm  Mood: \"fine\"  Affect: blunted  Thought Process: linear and goal-oriented  Thought Content: denies delusions  Risk Potential: denies suicidal ideation, plan, or intent. Denies homicidal ideation  Perceptions: denies auditory hallucinations, denies visual hallucinations,   Sensorium: Oriented to person, place, time, and situation  Cognition: cognitive ability appears intact but was not quantitatively tested  Consciousness: alert and awake  Attention: intact, able to focus without difficulty  Insight: limited  Judgement: limited            Vital signs in last 24 hours:    Temp:  [96.4 °F (35.8 °C)-97 °F (36.1 °C)] 97 °F (36.1 °C)  HR:  [68-80] 80  BP: (112-120)/(7-65) 120/65  Resp:  [16] 16  SpO2:  [96 %-97 %] 97 %  O2 Device: None (Room air)    Psychiatric Review of Systems:  Medication adverse effects: none  Sleep: unchanged  Appetite: unchanged  Behaviors over the past 24 hours: unchanged    Laboratory results:    I have personally reviewed all pertinent laboratory/tests results  No results found for this or any previous visit (from the past 48 hours).       Progress Toward Goals & Illness Status:   progressing, mood is stabilizing, discharge planning, " placement pending    Patient is not at goal. They are not yet ready for discharge. The patient's condition currently requires active psychopharmacological medication management, interdisciplinary coordination with case management, and the utilization of adjunctive milieu and group therapy to augment psychopharmacological efficacy. The patient's risk of morbidity, and progression or decompensation of psychiatric disease, is higher without this current treatment.     Next of Kin  Extended Emergency Contact Information  Primary Emergency Contact: Lois Roberson  Address: [address] [place] [address] [place]           [address]  Home Phone: [phone]  Relation: Mother    Counseling / Coordination of Care  Patient's progress discussed with staff in treatment team meeting.  Medications, treatment progress and treatment plan reviewed with patient.  Medication education provided to patient.  Educated on importance of medication and treatment compliance.  Supportive therapy provided to patient.  Cognitive techniques utilized during the session.  Reassurance and supportive therapy provided.  Reoriented to reality and reassured.  Encouraged participation in milieu and group therapy on the unit.  Crisis/safety plan discussed with patient.       Dustin Mcallister MD  Attending Psychiatrist   [facility]      This note has been constructed using a voice recognition system. There may be translation, syntax, or grammatical errors. If you have any questions, please contact the dictating provider.

## 2024-12-20 NOTE — PLAN OF CARE
Pt attends groups regularly during the week. Pt typically quiet in group, but participates appropriately when prompted.

## 2024-12-20 NOTE — NURSING NOTE
Patient is isolative to room, only out for snack. Patient retreated back to room in bed after. Patient denies all psych symptoms at this time. Q15 observation maintained.

## 2024-12-20 NOTE — NURSING NOTE
Patient denies SI, HI, AHs and VHs. Denies anxiety and depression. Patient is medication and meal compliant. Isolative to his room where he ate breakfast. He denies any needs at this time.

## 2024-12-20 NOTE — PROGRESS NOTES
12/20/24 0846   Team Meeting   Meeting Type Daily Rounds   Team Members Present   Team Members Present Physician;;Nurse   Physician Team Member Ary   Nursing Team Member Suarez   Care Management Team Member Noa   Patient/Family Present   Patient Present No   Patient's Family Present No     Pt is medication and meal compliant. Pt's is calm and compliant. Pt denies SI/HI/AVH. Pt's discharge is pending placement.

## 2024-12-20 NOTE — ASSESSMENT & PLAN NOTE
"Patient continues to be pending group home placement. Otherwise no clinical significant change.  Pt and SW able to have [place]/ [facility] [place]13. Waiting for follow [place] [facility] [place].    Abilify 5 mg daily as mood adjunct   Zoloft 150 mg daily for depression and anxiety  Continue to encourage participation in group therapy, milieu therapy and occupational therapy.  Continue to assess for side effects of medications.  Continue collaboration with [facility] for medical co-morbidities as indicated.  Continue discussion with CM/SW to assist with obtaining collateral, disposition planning, and the implementation of patient-centered individualized plan of care.  Continue frequent safety checks and vitals per unit protocol.    Risks, benefits and possible side effects of Medications: Risks, benefits, and possible side effects of medications have previously been explained. No new medications at this time.      Legal status: 201    Disposition: to be determined, pending SOAR application for potential group home placement. OT Cognitive Evaluation completed: \"Pt would benefit from discharge to a supportive environment that can provide checks for safety and compliance with IADLs. \"   Although patient's mood has stabilized, they are currently awaiting group home placement.  Their ability to recognize safety hazards take medications and participate in IADLs are significantly impaired by their cognitive deficits compounded by their chronic mental health needs and would be at risk for significant decompensation without the structure and support of a group home setting.      No associated orders from this encounter found during lookback period of 72 hours.  "

## 2024-12-20 NOTE — PLAN OF CARE
Problem: Ineffective Coping  Goal: Participates in unit activities  Description: Interventions:  - Provide therapeutic environment   - Provide required programming   - Redirect inappropriate behaviors   12/20/2024 0034 by Lindy Sandra RN  Outcome: Progressing  12/20/2024 0034 by Lindy Sandra RN  Outcome: Progressing     Problem: Depression  Goal: Treatment Goal: Demonstrate behavioral control of depressive symptoms, verbalize feelings of improved mood/affect, and adopt new coping skills prior to discharge  12/20/2024 0034 by Lindy Sandra RN  Outcome: Progressing  12/20/2024 0034 by Lindy Sandra RN  Outcome: Progressing  Goal: Verbalize thoughts and feelings  Description: Interventions:  - Assess and re-assess patient's level of risk   - Engage patient in 1:1 interactions, daily, for a minimum of 15 minutes   - Encourage patient to express feelings, fears, frustrations, hopes   12/20/2024 0034 by Lindy Sandra RN  Outcome: Progressing  12/20/2024 0034 by Lindy Sandra RN  Outcome: Progressing  Goal: Refrain from harming self  Description: Interventions:  - Monitor patient closely, per order   - Supervise medication ingestion, monitor effects and side effects   12/20/2024 0034 by Lindy Sandra RN  Outcome: Progressing  12/20/2024 0034 by Lindy Sandra RN  Outcome: Progressing  Goal: Refrain from isolation  Description: Interventions:  - Develop a trusting relationship   - Encourage socialization   12/20/2024 0034 by Lindy Sandra RN  Outcome: Progressing  12/20/2024 0034 by Lindy Sandra RN  Outcome: Progressing     Problem: Anxiety  Goal: Anxiety is at manageable level  Description: Interventions:  - Assess and monitor patient's anxiety level.   - Monitor for signs and symptoms (heart palpitations, chest pain, shortness of breath, headaches, nausea, feeling jumpy, restlessness, irritable, apprehensive).   - Collaborate with interdisciplinary team and initiate plan and interventions as ordered.  - [place] patient  to unit/surroundings  - Explain treatment plan  - Encourage participation in care  - Encourage verbalization of concerns/fears  - Identify coping mechanisms  - Assist in developing anxiety-reducing skills  - Administer/offer alternative therapies  - Limit or eliminate stimulants  12/20/2024 0034 by Lindy Sandra RN  Outcome: Progressing  12/20/2024 0034 by Lindy Sandra RN  Outcome: Progressing     Problem: DISCHARGE PLANNING - CARE MANAGEMENT  Goal: Discharge to post-acute care or home with appropriate resources  Description: INTERVENTIONS:  - Conduct assessment to determine patient/family and health care team treatment goals, and need for post-acute services based on payer coverage, community resources, and patient preferences, and barriers to discharge  - Address psychosocial, clinical, and financial barriers to discharge as identified in assessment in conjunction with the patient/family and health care team  - Arrange appropriate level of post-acute services according to patient’s   needs and preference and payer coverage in collaboration with the physician and health care team  - Communicate with and update the patient/family, physician, and health care team regarding progress on the discharge plan  - Arrange appropriate transportation to post-acute venues  12/20/2024 0034 by Lindy Sandra RN  Outcome: Progressing  12/20/2024 0034 by Lindy Sandra RN  Outcome: Progressing     Problem: Knowledge Deficit  Goal: Patient/family/caregiver demonstrates understanding of disease process, treatment plan, medications, and discharge instructions  Description: Complete learning assessment and assess knowledge base.  Interventions:  - Provide teaching at level of understanding  - Provide teaching via preferred learning methods  Outcome: Progressing     Problem: SLEEP DISTURBANCE  Goal: Will exhibit normal sleeping pattern  Description: Interventions:  -  Assess the patients sleep pattern, noting recent changes  - Administer  medication as ordered  - Decrease environmental stimuli, including noise, as appropriate during the night  - Encourage the patient to actively participate in unit groups and or exercise during the day to enhance ability to achieve adequate sleep at night  - Assess the patient, in the morning, encouraging a description of sleep experience  Outcome: Progressing

## 2024-12-21 PROCEDURE — 99232 SBSQ HOSP IP/OBS MODERATE 35: CPT | Performed by: PSYCHIATRY & NEUROLOGY

## 2024-12-21 RX ADMIN — ARIPIPRAZOLE 5 MG: 5 TABLET ORAL at 08:53

## 2024-12-21 RX ADMIN — LEVOTHYROXINE SODIUM 37.5 MCG: 125 TABLET ORAL at 06:15

## 2024-12-21 RX ADMIN — FAMOTIDINE 20 MG: 20 TABLET ORAL at 08:53

## 2024-12-21 RX ADMIN — CYANOCOBALAMIN TAB 1000 MCG 1000 MCG: 1000 TAB at 08:53

## 2024-12-21 RX ADMIN — Medication 2500 UNITS: at 08:53

## 2024-12-21 RX ADMIN — ATORVASTATIN CALCIUM 10 MG: 10 TABLET, FILM COATED ORAL at 17:45

## 2024-12-21 RX ADMIN — SENNOSIDES AND DOCUSATE SODIUM 2 TABLET: 50; 8.6 TABLET ORAL at 17:45

## 2024-12-21 RX ADMIN — SERTRALINE HYDROCHLORIDE 150 MG: 100 TABLET ORAL at 08:53

## 2024-12-21 RX ADMIN — FAMOTIDINE 20 MG: 20 TABLET ORAL at 17:45

## 2024-12-21 NOTE — PROGRESS NOTES
"Progress Note - Behavioral Health   Name: Franco Roberson 39 y.o. male I MRN: [id number]  Unit/Bed#: -01 I Date of Admission: 5/14/2024   Date of Service: 12/21/2024 I Hospital Day: 221     Assessment & Plan  MDD (major depressive disorder), recurrent severe, without psychosis (HCC)  Patient continues to be pending group home placement. Otherwise no clinical significant change.  Pt and SW able to have meeting w/ [facility] on 12/13. Waiting for follow up from [facility] by mail.    Abilify 5 mg daily as mood adjunct   Zoloft 150 mg daily for depression and anxiety  Continue to encourage participation in group therapy, milieu therapy and occupational therapy.  Continue to assess for side effects of medications.  Continue collaboration with [facility] for medical co-morbidities as indicated.  Continue discussion with CM/SW to assist with obtaining collateral, disposition planning, and the implementation of patient-centered individualized plan of care.  Continue frequent safety checks and vitals per unit protocol.    Risks, benefits and possible side effects of Medications: Risks, benefits, and possible side effects of medications have previously been explained. No new medications at this time.      Legal status: 201    Disposition: to be determined, pending SOAR application for potential group home placement. OT Cognitive Evaluation completed: \"Pt would benefit from discharge to a supportive environment that can provide checks for safety and compliance with IADLs. \"   Although patient's mood has stabilized, they are currently awaiting group home placement.  Their ability to recognize safety hazards take medications and participate in IADLs are significantly impaired by their cognitive deficits compounded by their chronic mental health needs and would be at risk for significant decompensation without the structure and support of a group home setting.      No associated orders from this encounter found during lookback period of 72 " hours.  Autism spectrum disorder  Continue supportive care    No associated orders from this encounter found during lookback period of 72 hours.    Discharge disposition:   201, discharge pending placement    Recommended Treatment:   - Encourage group therapy, milieu therapy, and occupational therapy  - Behavioral Health checks every 15 minutes  - Medical management per [facility]    Risks, benefits and possible side effects of Medications:   Risks, benefits, and possible side effects of medications explained to patient and patient verbalizes understanding.            SUBJECTIVE:  The patient was evaluated today for continuity of care and no acute distress noted throughout the evaluation.  The patient was evaluated in his room today, he denies acute concerns and believes medications are helping control his symptoms.  He denies adverse effects to medication.  He states that he is hopeful for placement soon.    Psychiatric Review of Systems:  Behavior over the last 24 hours:  unchanged  Sleep: normal  Appetite: normal  Medication side effects: No   ROS: no complaints, all other systems are negative    OBJECTIVE:  Current Medications:  Current Facility-Administered Medications   Medication Dose Route Frequency Provider Last Rate    acetaminophen  650 mg Oral Q6H PRN Basilio Brown MD      acetaminophen  650 mg Oral Q4H PRN Basilio Brown MD      acetaminophen  975 mg Oral Q6H PRN Basilio Brown MD      aluminum-magnesium hydroxide-simethicone  30 mL Oral Q4H PRN Basilio Brown MD      ARIPiprazole  5 mg Oral Daily Basilio Panda MD      Artificial Tears  1 drop Both Eyes Q3H PRN Basilio Brown MD      atorvastatin  10 mg Oral Daily With Dinner WALLY Baptiste      benztropine  1 mg Intramuscular Q4H PRN Max 6/day Basilio Brown MD      benztropine  1 mg Oral Q4H PRN Max 6/day Bsailio Brown MD      Cholecalciferol  2,500 Units Oral Daily Dustin Mcallister MD       cyanocobalamin  1,000 mcg Oral Daily WALLY Baptiste      Diclofenac Sodium  2 g Topical 4x Daily PRN WALLY Baptiste      hydrOXYzine HCL  50 mg Oral Q6H PRN Max 4/day Basilio Brown MD      Or    diphenhydrAMINE  50 mg Intramuscular Q6H PRN Basilio Brown MD      diphenhydrAMINE-zinc acetate   Topical Daily PRN Raj Guerrero MD      famotidine  20 mg Oral BID WALLY Baptiste      hydrOXYzine HCL  100 mg Oral Q6H PRN Max 4/day Basilio Brown MD      Or    LORazepam  2 mg Intramuscular Q6H PRN Basilio Brown MD      hydrOXYzine HCL  25 mg Oral Q6H PRN Max 4/day Basilio Brown MD      levothyroxine  37.5 mcg Oral Early Morning WALLY Baptiste      melatonin  6 mg Oral HS PRN WALLY Gomez      methocarbamol  500 mg Oral Q6H PRN WALLY Baptiste      OLANZapine  5 mg Oral Q4H PRN Max 3/day Basilio Brown MD      Or    OLANZapine  2.5 mg Intramuscular Q4H PRN Max 3/day Basilio Brown MD      OLANZapine  5 mg Oral Q3H PRN Max 3/day Basilio Brown MD      Or    OLANZapine  5 mg Intramuscular Q3H PRN Max 3/day Basilio Brown MD      OLANZapine  2.5 mg Oral Q4H PRN Max 6/day Basilio Brown MD      polyethylene glycol  17 g Oral Daily PRN Basilio Brown MD      propranolol  10 mg Oral Q8H PRN Basilio Brown MD      senna-docusate sodium  2 tablet Oral After Dinner Dustin Mcallister MD      sertraline  150 mg Oral Daily WALLY Gomez      traZODone  50 mg Oral HS PRN Basilio Brown MD         Vital signs in last 24 hours:  Temp:  [97.2 °F (36.2 °C)-97.3 °F (36.3 °C)] 97.2 °F (36.2 °C)  HR:  [75-80] 76  BP: (103-120)/(63-71) 115/71  Resp:  [16] 16  SpO2:  [96 %-97 %] 97 %  O2 Device: None (Room air)    Laboratory results:  I have personally reviewed all pertinent laboratory/tests results.  Most Recent Labs:   Lab Results   Component Value Date    WBC 6.75 11/12/2024    RBC  5.06 11/12/2024    HGB 15.9 11/12/2024    HCT 46.2 11/12/2024     11/12/2024    RDW 12.8 11/12/2024    NEUTROABS 4.15 11/12/2024    SODIUM 139 11/12/2024    K 4.0 11/12/2024     11/12/2024    CO2 30 11/12/2024    BUN 17 11/12/2024    CREATININE 0.99 11/12/2024    GLUC 85 11/12/2024    GLUF 85 11/12/2024    CALCIUM 9.4 11/12/2024    AST 23 11/12/2024    ALT 32 11/12/2024    ALKPHOS 73 11/12/2024    TP 8.1 11/12/2024    ALB 4.4 11/12/2024    TBILI 0.54 11/12/2024    CHOLESTEROL 151 11/26/2024    HDL 39 (L) 11/26/2024    TRIG 170 (H) 11/26/2024    LDLCALC 78 11/26/2024    NONHDLC 112 11/26/2024    HGN3AKHCKTIH 2.532 11/12/2024    FREET4 0.56 (L) 09/10/2024             Mental Status Exam:  Appearance:  casually dressed, marginal hygiene   Behavior:  cooperative, responds to redirection   Speech:  normal rate, normal volume, normal pitch   Mood:  normal   Affect:  constricted   Thought Process:  perseverative   Associations: perseveration   Thought Content:  no overt delusions   Perceptual Disturbances: no auditory hallucinations, no visual hallucinations, does not appear responding to internal stimuli   Risk Potential: Suicidal ideation - None at present  Homicidal ideation - None at present  Potential for aggression - Not at present   Sensorium:  oriented to person, place, and time/date   Memory:  recent and remote memory grossly intact   Consciousness:  alert and awake   Attention/Concentration: attention span and concentration appear shorter than expected for age   Insight:  limited   Judgment: limited   Gait/Station: normal gait/station, normal balance   Motor Activity: no abnormal movements         Sergio Dale DO  Attending Psychiatrist   [facility]    This note was not shared with the patient due to reasonable likelihood of causing patient harm    This note was completed in part utilizing Dragon dictation Software. Grammatical, translation, syntax errors, random word  insertions, spelling mistakes, and incomplete sentences may be an occasional consequence of this system secondary to software limitations with voice recognition, ambient noise, and hardware issues. If you have any questions or concerns about the content, text, or information contained within the body of this dictation, please contact the provider for clarification.

## 2024-12-21 NOTE — NURSING NOTE
Patient denies SI, HI, AHs and VHs. Denies anxiety and depression. Patient is medication and meal compliant. Isolative to self, spent much of the day laying in bed. He denies any needs at this time.

## 2024-12-21 NOTE — PLAN OF CARE
Problem: Ineffective Coping  Goal: Participates in unit activities  Description: Interventions:  - Provide therapeutic environment   - Provide required programming   - Redirect inappropriate behaviors   Outcome: Progressing     Problem: Depression  Goal: Treatment Goal: Demonstrate behavioral control of depressive symptoms, verbalize feelings of improved mood/affect, and adopt new coping skills prior to discharge  Outcome: Progressing  Goal: Verbalize thoughts and feelings  Description: Interventions:  - Assess and re-assess patient's level of risk   - Engage patient in 1:1 interactions, daily, for a minimum of 15 minutes   - Encourage patient to express feelings, fears, frustrations, hopes   Outcome: Progressing  Goal: Refrain from harming self  Description: Interventions:  - Monitor patient closely, per order   - Supervise medication ingestion, monitor effects and side effects   Outcome: Progressing  Goal: Refrain from isolation  Description: Interventions:  - Develop a trusting relationship   - Encourage socialization   Outcome: Progressing     Problem: Anxiety  Goal: Anxiety is at manageable level  Description: Interventions:  - Assess and monitor patient's anxiety level.   - Monitor for signs and symptoms (heart palpitations, chest pain, shortness of breath, headaches, nausea, feeling jumpy, restlessness, irritable, apprehensive).   - Collaborate with interdisciplinary team and initiate plan and interventions as ordered.  - [place] patient to unit/surroundings  - Explain treatment plan  - Encourage participation in care  - Encourage verbalization of concerns/fears  - Identify coping mechanisms  - Assist in developing anxiety-reducing skills  - Administer/offer alternative therapies  - Limit or eliminate stimulants  Outcome: Progressing     Problem: DISCHARGE PLANNING - CARE MANAGEMENT  Goal: Discharge to post-acute care or home with appropriate resources  Description: INTERVENTIONS:  - Conduct assessment to  determine patient/family and health care team treatment goals, and need for post-acute services based on payer coverage, community resources, and patient preferences, and barriers to discharge  - Address psychosocial, clinical, and financial barriers to discharge as identified in assessment in conjunction with the patient/family and health care team  - Arrange appropriate level of post-acute services according to patient’s   needs and preference and payer coverage in collaboration with the physician and health care team  - Communicate with and update the patient/family, physician, and health care team regarding progress on the discharge plan  - Arrange appropriate transportation to post-acute venues  Outcome: Progressing     Problem: Knowledge Deficit  Goal: Patient/family/caregiver demonstrates understanding of disease process, treatment plan, medications, and discharge instructions  Description: Complete learning assessment and assess knowledge base.  Interventions:  - Provide teaching at level of understanding  - Provide teaching via preferred learning methods  Outcome: Progressing     Problem: SLEEP DISTURBANCE  Goal: Will exhibit normal sleeping pattern  Description: Interventions:  -  Assess the patients sleep pattern, noting recent changes  - Administer medication as ordered  - Decrease environmental stimuli, including noise, as appropriate during the night  - Encourage the patient to actively participate in unit groups and or exercise during the day to enhance ability to achieve adequate sleep at night  - Assess the patient, in the morning, encouraging a description of sleep experience  Outcome: Progressing

## 2024-12-21 NOTE — NURSING NOTE
Pt calm and cooperative. Denies any SI/HI/AVH at this time. Pt pleasant in conversation, isolative mainly to room and able to make needs known. No HS medications scheduled, pt cooperative with unit expectations. Currently denies any unmet needs. Q15 minute checks maintained for safety.

## 2024-12-21 NOTE — ASSESSMENT & PLAN NOTE
"Patient continues to be pending group home placement. Otherwise no clinical significant change.  Pt and SW able to have meeting w/ [facility] on 12/13. Waiting for follow up from [facility] by mail.    Abilify 5 mg daily as mood adjunct   Zoloft 150 mg daily for depression and anxiety  Continue to encourage participation in group therapy, milieu therapy and occupational therapy.  Continue to assess for side effects of medications.  Continue collaboration with [facility] for medical co-morbidities as indicated.  Continue discussion with CM/SW to assist with obtaining collateral, disposition planning, and the implementation of patient-centered individualized plan of care.  Continue frequent safety checks and vitals per unit protocol.    Risks, benefits and possible side effects of Medications: Risks, benefits, and possible side effects of medications have previously been explained. No new medications at this time.      Legal status: 201    Disposition: to be determined, pending SOAR application for potential group home placement. OT Cognitive Evaluation completed: \"Pt would benefit from discharge to a supportive environment that can provide checks for safety and compliance with IADLs. \"   Although patient's mood has stabilized, they are currently awaiting group home placement.  Their ability to recognize safety hazards take medications and participate in IADLs are significantly impaired by their cognitive deficits compounded by their chronic mental health needs and would be at risk for significant decompensation without the structure and support of a group home setting.      No associated orders from this encounter found during lookback period of 72 hours.  "

## 2024-12-22 VITALS
TEMPERATURE: 97.5 F | HEART RATE: 86 BPM | DIASTOLIC BLOOD PRESSURE: 65 MMHG | HEIGHT: 72 IN | OXYGEN SATURATION: 97 % | BODY MASS INDEX: 39.22 KG/M2 | SYSTOLIC BLOOD PRESSURE: 121 MMHG | RESPIRATION RATE: 16 BRPM | WEIGHT: 289.6 LBS

## 2024-12-22 PROCEDURE — 99232 SBSQ HOSP IP/OBS MODERATE 35: CPT | Performed by: PSYCHIATRY & NEUROLOGY

## 2024-12-22 RX ADMIN — FAMOTIDINE 20 MG: 20 TABLET ORAL at 09:55

## 2024-12-22 RX ADMIN — SENNOSIDES AND DOCUSATE SODIUM 2 TABLET: 50; 8.6 TABLET ORAL at 17:18

## 2024-12-22 RX ADMIN — CYANOCOBALAMIN TAB 1000 MCG 1000 MCG: 1000 TAB at 09:55

## 2024-12-22 RX ADMIN — Medication 2500 UNITS: at 09:53

## 2024-12-22 RX ADMIN — ATORVASTATIN CALCIUM 10 MG: 10 TABLET, FILM COATED ORAL at 17:18

## 2024-12-22 RX ADMIN — LEVOTHYROXINE SODIUM 37.5 MCG: 125 TABLET ORAL at 05:44

## 2024-12-22 RX ADMIN — FAMOTIDINE 20 MG: 20 TABLET ORAL at 17:18

## 2024-12-22 RX ADMIN — SERTRALINE HYDROCHLORIDE 150 MG: 100 TABLET ORAL at 09:54

## 2024-12-22 RX ADMIN — ARIPIPRAZOLE 5 MG: 5 TABLET ORAL at 09:54

## 2024-12-22 NOTE — ASSESSMENT & PLAN NOTE
"Patient continues to be pending group home placement. Otherwise no clinical significant change.  Pt and SW able to have meeting w/ [facility] on 12/13. Waiting for follow up from [facility] by mail.    Abilify 5 mg daily as mood adjunct   Zoloft 150 mg daily for depression and anxiety  Continue to encourage participation in group therapy, milieu therapy and occupational therapy.  Continue to assess for side effects of medications.  Continue collaboration with [facility] for medical co-morbidities as indicated.  Continue discussion with CM/SW to assist with obtaining collateral, disposition planning, and the implementation of patient-centered individualized plan of care.  Continue frequent safety checks and vitals per unit protocol.    Risks, benefits and possible side effects of Medications: Risks, benefits, and possible side effects of medications have previously been explained. No new medications at this time.      Legal status: 201    Disposition: to be determined, pending SOAR application for potential group home placement. OT Cognitive Evaluation completed: \"Pt would benefit from discharge to a supportive environment that can provide checks for safety and compliance with IADLs. \"   Although patient's mood has stabilized, they are currently awaiting group home placement.  Their ability to recognize safety hazards take medications and participate in IADLs are significantly impaired by their cognitive deficits compounded by their chronic mental health needs and would be at risk for significant decompensation without the structure and support of a group home setting.      No associated orders from this encounter found during lookback period of 72 hours.  "

## 2024-12-22 NOTE — PLAN OF CARE
Problem: Ineffective Coping  Goal: Participates in unit activities  Description: Interventions:  - Provide therapeutic environment   - Provide required programming   - Redirect inappropriate behaviors   Outcome: Progressing     Problem: Depression  Goal: Treatment Goal: Demonstrate behavioral control of depressive symptoms, verbalize feelings of improved mood/affect, and adopt new coping skills prior to discharge  Outcome: Progressing  Goal: Verbalize thoughts and feelings  Description: Interventions:  - Assess and re-assess patient's level of risk   - Engage patient in 1:1 interactions, daily, for a minimum of 15 minutes   - Encourage patient to express feelings, fears, frustrations, hopes   Outcome: Progressing  Goal: Refrain from harming self  Description: Interventions:  - Monitor patient closely, per order   - Supervise medication ingestion, monitor effects and side effects   Outcome: Progressing  Goal: Refrain from isolation  Description: Interventions:  - Develop a trusting relationship   - Encourage socialization   Outcome: Progressing     Problem: Anxiety  Goal: Anxiety is at manageable level  Description: Interventions:  - Assess and monitor patient's anxiety level.   - Monitor for signs and symptoms (heart palpitations, chest pain, shortness of breath, headaches, nausea, feeling jumpy, restlessness, irritable, apprehensive).   - Collaborate with interdisciplinary team and initiate plan and interventions as ordered.  - [place] patient to unit/surroundings  - Explain treatment plan  - Encourage participation in care  - Encourage verbalization of concerns/fears  - Identify coping mechanisms  - Assist in developing anxiety-reducing skills  - Administer/offer alternative therapies  - Limit or eliminate stimulants  Outcome: Progressing     Problem: DISCHARGE PLANNING - CARE MANAGEMENT  Goal: Discharge to post-acute care or home with appropriate resources  Description: INTERVENTIONS:  - Conduct assessment to  determine patient/family and health care team treatment goals, and need for post-acute services based on payer coverage, community resources, and patient preferences, and barriers to discharge  - Address psychosocial, clinical, and financial barriers to discharge as identified in assessment in conjunction with the patient/family and health care team  - Arrange appropriate level of post-acute services according to patient’s   needs and preference and payer coverage in collaboration with the physician and health care team  - Communicate with and update the patient/family, physician, and health care team regarding progress on the discharge plan  - Arrange appropriate transportation to post-acute venues  Outcome: Progressing     Problem: Knowledge Deficit  Goal: Patient/family/caregiver demonstrates understanding of disease process, treatment plan, medications, and discharge instructions  Description: Complete learning assessment and assess knowledge base.  Interventions:  - Provide teaching at level of understanding  - Provide teaching via preferred learning methods  Outcome: Progressing     Problem: SLEEP DISTURBANCE  Goal: Will exhibit normal sleeping pattern  Description: Interventions:  -  Assess the patients sleep pattern, noting recent changes  - Administer medication as ordered  - Decrease environmental stimuli, including noise, as appropriate during the night  - Encourage the patient to actively participate in unit groups and or exercise during the day to enhance ability to achieve adequate sleep at night  - Assess the patient, in the morning, encouraging a description of sleep experience  Outcome: Progressing

## 2024-12-22 NOTE — PROGRESS NOTES
"Progress Note - Behavioral Health   Name: Franco Roberson 39 y.o. male I MRN: [id number]  Unit/Bed#: -01 I Date of Admission: 5/14/2024   Date of Service: 12/22/2024 I Hospital Day: 222     Assessment & Plan  MDD (major depressive disorder), recurrent severe, without psychosis (HCC)  Patient continues to be pending group home placement. Otherwise no clinical significant change.  Pt and SW able to have meeting w/ [facility] on 12/13. Waiting for follow [place] [facility] [place].    Abilify 5 mg daily as mood adjunct   Zoloft 150 mg daily for depression and anxiety  Continue to encourage participation in group therapy, milieu therapy and occupational therapy.  Continue to assess for side effects of medications.  Continue collaboration with [facility] for medical co-morbidities as indicated.  Continue discussion with CM/SW to assist with obtaining collateral, disposition planning, and the implementation of patient-centered individualized plan of care.  Continue frequent safety checks and vitals per unit protocol.    Risks, benefits and possible side effects of Medications: Risks, benefits, and possible side effects of medications have previously been explained. No new medications at this time.      Legal status: 201    Disposition: to be determined, pending SOAR application for potential group home placement. OT Cognitive Evaluation completed: \"Pt would benefit from discharge to a supportive environment that can provide checks for safety and compliance with IADLs. \"   Although patient's mood has stabilized, they are currently awaiting group home placement.  Their ability to recognize safety hazards take medications and participate in IADLs are significantly impaired by their cognitive deficits compounded by their chronic mental health needs and would be at risk for significant decompensation without the structure and support of a group home setting.      No associated orders from this encounter found during lookback period of 72 " hours.  Autism spectrum disorder  Continue supportive care    No associated orders from this encounter found during lookback period of 72 hours.      Discharge disposition:   201, discharge pending placement    Recommended Treatment:   - Encourage group therapy, milieu therapy, and occupational therapy  - Behavioral Health checks every 15 minutes  - Medical management per [facility]    Risks, benefits and possible side effects of Medications:   Risks, benefits, and possible side effects of medications explained to patient and patient verbalizes understanding.            SUBJECTIVE:  The patient was evaluated today for continuity of care and no acute distress noted throughout the evaluation.  The patient was evaluated in his room today, he was superficially cooperative with the evaluation, denying all psychiatric symptoms.  Per staff patient has been social and visible on the unit and cooperative with routine.  Patient endorsed adequate sleep and appetite.  He denied suicidal or homicidal ideation and denied auditory or visual hallucinations.    Psychiatric Review of Systems:  Behavior over the last 24 hours:  unchanged  Sleep: normal  Appetite: normal  Medication side effects: No   ROS: no complaints, all other systems are negative    OBJECTIVE:  Current Medications:  Current Facility-Administered Medications   Medication Dose Route Frequency Provider Last Rate    acetaminophen  650 mg Oral Q6H PRN Basilio Brown MD      acetaminophen  650 mg Oral Q4H PRN Basilio Brown MD      acetaminophen  975 mg Oral Q6H PRN Basilio Brown MD      aluminum-magnesium hydroxide-simethicone  30 mL Oral Q4H PRN Basilio Brown MD      ARIPiprazole  5 mg Oral Daily Basilio Panda MD      Artificial Tears  1 drop Both Eyes Q3H PRN Basilio Brown MD      atorvastatin  10 mg Oral Daily With Dinner WALLY Baptiste      benztropine  1 mg Intramuscular Q4H PRN Max 6/day Basilio Brown MD       benztropine  1 mg Oral Q4H PRN Max 6/day Basilio Brown MD      Cholecalciferol  2,500 Units Oral Daily Dustin Mcallister MD      cyanocobalamin  1,000 mcg Oral Daily WALLY Baptiste      Diclofenac Sodium  2 g Topical 4x Daily PRN WALLY Baptiste      hydrOXYzine HCL  50 mg Oral Q6H PRN Max 4/day Basilio Brown MD      Or    diphenhydrAMINE  50 mg Intramuscular Q6H PRN Basilio Brown MD      diphenhydrAMINE-zinc acetate   Topical Daily PRN Raj Guerrero MD      famotidine  20 mg Oral BID WALLY Baptiste      hydrOXYzine HCL  100 mg Oral Q6H PRN Max 4/day Basilio Brown MD      Or    LORazepam  2 mg Intramuscular Q6H PRN Basilio Brown MD      hydrOXYzine HCL  25 mg Oral Q6H PRN Max 4/day Basilio Brown MD      levothyroxine  37.5 mcg Oral Early Morning WALLY Baptiste      melatonin  6 mg Oral HS PRN WALLY Gomez      methocarbamol  500 mg Oral Q6H PRN WALLY Baptiste      OLANZapine  5 mg Oral Q4H PRN Max 3/day Basilio Brown MD      Or    OLANZapine  2.5 mg Intramuscular Q4H PRN Max 3/day Basilio Brown MD      OLANZapine  5 mg Oral Q3H PRN Max 3/day Basilio Brown MD      Or    OLANZapine  5 mg Intramuscular Q3H PRN Max 3/day Basilio Brown MD      OLANZapine  2.5 mg Oral Q4H PRN Max 6/day Basilio Brown MD      polyethylene glycol  17 g Oral Daily PRN Basilio Brown MD      propranolol  10 mg Oral Q8H PRN Basilio Brown MD      senna-docusate sodium  2 tablet Oral After Dinner Dustin Mcallister MD      sertraline  150 mg Oral Daily WALLY Gomez      traZODone  50 mg Oral HS PRN Basilio Brown MD         Vital signs in last 24 hours:  Temp:  [96.8 °F (36 °C)-97.1 °F (36.2 °C)] 96.8 °F (36 °C)  HR:  [74-86] 74  BP: (112-114)/(61-66) 114/61  Resp:  [16] 16  SpO2:  [96 %-99 %] 99 %  O2 Device: None (Room air)    Laboratory results:  I have personally reviewed  all pertinent laboratory/tests results.  Most Recent Labs:   Lab Results   Component Value Date    WBC 6.75 11/12/2024    RBC 5.06 11/12/2024    HGB 15.9 11/12/2024    HCT 46.2 11/12/2024     11/12/2024    RDW 12.8 11/12/2024    NEUTROABS 4.15 11/12/2024    SODIUM 139 11/12/2024    K 4.0 11/12/2024     11/12/2024    CO2 30 11/12/2024    BUN 17 11/12/2024    CREATININE 0.99 11/12/2024    GLUC 85 11/12/2024    GLUF 85 11/12/2024    CALCIUM 9.4 11/12/2024    AST 23 11/12/2024    ALT 32 11/12/2024    ALKPHOS 73 11/12/2024    TP 8.1 11/12/2024    ALB 4.4 11/12/2024    TBILI 0.54 11/12/2024    CHOLESTEROL 151 11/26/2024    HDL 39 (L) 11/26/2024    TRIG 170 (H) 11/26/2024    LDLCALC 78 11/26/2024    NONHDLC 112 11/26/2024    [id number] 2.532 11/12/2024    FREET4 0.56 (L) 09/10/2024             Mental Status Exam:  Appearance:  casually dressed, marginal hygiene   Behavior:  Superficially cooperative   Speech:  scant   Mood:  normal   Affect:  normal range and intensity   Thought Process:  poverty of thought   Associations: intact associations   Thought Content:  no overt delusions   Perceptual Disturbances: no auditory hallucinations, no visual hallucinations, does not appear responding to internal stimuli   Risk Potential: Suicidal ideation - None at present  Homicidal ideation - None at present  Potential for aggression - No   Sensorium:  oriented to person, place, and time/date   Memory:  recent and remote memory grossly intact   Consciousness:  alert and awake   Attention/Concentration: attention span and concentration are age appropriate   Insight:  limited   Judgment: limited   Gait/Station: normal gait/station, normal balance   Motor Activity: no abnormal movements         Sergio Dale DO  Attending Psychiatrist   [facility]    This note was not shared with the patient due to reasonable likelihood of causing patient harm    This note was completed in part utilizing Dragon  dictation Software. Grammatical, translation, syntax errors, random word insertions, spelling mistakes, and incomplete sentences may be an occasional consequence of this system secondary to software limitations with voice recognition, ambient noise, and hardware issues. If you have any questions or concerns about the content, text, or information contained within the body of this dictation, please contact the provider for clarification.

## 2024-12-22 NOTE — NURSING NOTE
Pt is withdrawn to self and can be seen walking the hallway. Pt is cooperative and med compliant. Pt denies depression and anxiety. Pt denies SI/HI and AH/VH. No unmet needs reported. Will continue to monitor.

## 2024-12-22 NOTE — NURSING NOTE
Patient denies SI, HI, AHs and VHs. Denies anxiety and depression. Patient is medication and meal compliant. Withdrawn to room where he has been laying in bed. Pleasant upon approach. He denies any needs at this time.

## 2024-12-23 PROCEDURE — 99232 SBSQ HOSP IP/OBS MODERATE 35: CPT | Performed by: PSYCHIATRY & NEUROLOGY

## 2024-12-23 RX ADMIN — SENNOSIDES AND DOCUSATE SODIUM 2 TABLET: 50; 8.6 TABLET ORAL at 17:11

## 2024-12-23 RX ADMIN — ATORVASTATIN CALCIUM 10 MG: 10 TABLET, FILM COATED ORAL at 17:11

## 2024-12-23 RX ADMIN — FAMOTIDINE 20 MG: 20 TABLET ORAL at 17:11

## 2024-12-23 RX ADMIN — LEVOTHYROXINE SODIUM 37.5 MCG: 125 TABLET ORAL at 05:56

## 2024-12-23 RX ADMIN — Medication 2500 UNITS: at 08:26

## 2024-12-23 RX ADMIN — ARIPIPRAZOLE 5 MG: 5 TABLET ORAL at 08:26

## 2024-12-23 RX ADMIN — CYANOCOBALAMIN TAB 1000 MCG 1000 MCG: 1000 TAB at 08:27

## 2024-12-23 RX ADMIN — SERTRALINE HYDROCHLORIDE 150 MG: 100 TABLET ORAL at 08:26

## 2024-12-23 RX ADMIN — FAMOTIDINE 20 MG: 20 TABLET ORAL at 08:27

## 2024-12-23 NOTE — PROGRESS NOTES
"Progress Note - Behavioral Health   Name: Franco Roberson 39 y.o. male I MRN: [id number]  Unit/Bed#: -01 I Date of Admission: 5/14/2024   Date of Service: 12/23/2024 I Hospital Day: 223     Assessment & Plan  MDD (major depressive disorder), recurrent severe, without psychosis (HCC)  Patient continues to be pending group home placement. Otherwise no clinical significant change.  Pt and SW able to have meeting w/ [facility] on 12/13. Waiting for follow [facility] mail.    Abilify 5 mg daily as mood adjunct   Zoloft 150 mg daily for depression and anxiety  Continue to encourage participation in group therapy, milieu therapy and occupational therapy.  Continue to assess for side effects of medications.  Continue collaboration with [facility] for medical co-morbidities as indicated.  Continue discussion with CM/SW to assist with obtaining collateral, disposition planning, and the implementation of patient-centered individualized plan of care.  Continue frequent safety checks and vitals per unit protocol.    Risks, benefits and possible side effects of Medications: Risks, benefits, and possible side effects of medications have previously been explained. No new medications at this time.      Legal status: 201    Disposition: to be determined, pending SOAR application for potential group home placement. OT Cognitive Evaluation completed: \"Pt would benefit from discharge to a supportive environment that can provide checks for safety and compliance with IADLs. \"   Although patient's mood has stabilized, they are currently awaiting group home placement.  Their ability to recognize safety hazards take medications and participate in IADLs are significantly impaired by their cognitive deficits compounded by their chronic mental health needs and would be at risk for significant decompensation without the structure and support of a group home setting.      No associated orders from this encounter found during lookback period of 72 " hours.  Autism spectrum disorder  Continue supportive care    No associated orders from this encounter found during lookback period of 72 hours.        Plan     Recommended Treatment:    - Encourage early mobility and having a structured day  - Provide frequent re-orientation, and cognitive stimulation  - Ensure assistive devices are in proper working order (eye-glasses, hearing aids)  - Encourage adequate hydration, nutrition and monitor bowel movements  - Maintain sleep-wake cycle: Uninterrupted sleep time; low-level lighting at night  - Fall precaution  - f/u [facility] recs regarding the medical problems   - Continue medication titration and treatment plan; adjust medication to optimize treatment response and as clinically indicated. .   - Observation: routine            - VS: as per unit protocol  - Diet: Regular diet  - Encourage group attendance and milieu therapy  - Dispo: To be determined     Scheduled medications:  Current Facility-Administered Medications   Medication Dose Route Frequency Provider Last Rate    acetaminophen  650 mg Oral Q6H PRN Basilio Brown MD      acetaminophen  650 mg Oral Q4H PRN Basilio Brown MD      acetaminophen  975 mg Oral Q6H PRN Basilio Brown MD      aluminum-magnesium hydroxide-simethicone  30 mL Oral Q4H PRN Basilio Brown MD      ARIPiprazole  5 mg Oral Daily Basilio Panda MD      Artificial Tears  1 drop Both Eyes Q3H PRN Basilio Brown MD      atorvastatin  10 mg Oral Daily With Dinner WALLY Baptiste      benztropine  1 mg Intramuscular Q4H PRN Max 6/day Basilio Brown MD      benztropine  1 mg Oral Q4H PRN Max 6/day Basilio Brown MD      Cholecalciferol  2,500 Units Oral Daily uDstin Mcallister MD      cyanocobalamin  1,000 mcg Oral Daily WALLY Baptiste      Diclofenac Sodium  2 g Topical 4x Daily PRN WALLY Baptiste      hydrOXYzine HCL  50 mg Oral Q6H PRN Max 4/day Basilio Brown MD       Or    diphenhydrAMINE  50 mg Intramuscular Q6H PRN Basilio Brown MD      diphenhydrAMINE-zinc acetate   Topical Daily PRN Raj Guerrero MD      famotidine  20 mg Oral BID Laura Amber Ashford, WALLY      hydrOXYzine HCL  100 mg Oral Q6H PRN Max 4/day Basilio Brown MD      Or    LORazepam  2 mg Intramuscular Q6H PRN Basilio Brown MD      hydrOXYzine HCL  25 mg Oral Q6H PRN Max 4/day Basilio Brown MD      levothyroxine  37.5 mcg Oral Early Morning Laura Ashford, WALLY      melatonin  6 mg Oral HS PRN WALLY Gomez      methocarbamol  500 mg Oral Q6H PRN WALLY Baptiste      OLANZapine  5 mg Oral Q4H PRN Max 3/day Basilio Brown MD      Or    OLANZapine  2.5 mg Intramuscular Q4H PRN Max 3/day Basilio Brown MD      OLANZapine  5 mg Oral Q3H PRN Max 3/day Basilio Brown MD      Or    OLANZapine  5 mg Intramuscular Q3H PRN Max 3/day Basilio Brown MD      OLANZapine  2.5 mg Oral Q4H PRN Max 6/day Basilio Brown MD      polyethylene glycol  17 g Oral Daily PRN Basilio Brown MD      propranolol  10 mg Oral Q8H PRN Basilio Brown MD      senna-docusate sodium  2 tablet Oral After Dinner Dustin Mcallister MD      sertraline  150 mg Oral Daily WALLY Gomez      traZODone  50 mg Oral HS PRN Basilio Brown MD        PRN:    acetaminophen    acetaminophen    acetaminophen    aluminum-magnesium hydroxide-simethicone    Artificial Tears    benztropine    benztropine    Diclofenac Sodium    hydrOXYzine HCL **OR** diphenhydrAMINE    diphenhydrAMINE-zinc acetate    hydrOXYzine HCL **OR** LORazepam    hydrOXYzine HCL    melatonin    methocarbamol    OLANZapine **OR** OLANZapine    OLANZapine **OR** OLANZapine    OLANZapine    polyethylene glycol    propranolol    traZODone       Subjective     Patient was visited on unit for continuing care; chart reviewed and discussed with multidisciplinary treatment team.  Denied any  "changes in mood, appetite, and energy level. No problem initiating and maintaining sleep.  Denied A/VH currently.  Denied SI/HI, intent or plan upon direct inquiry at this time.  No physical complaints or concerns at this time.  Remains hopeful for discharge to group home setting    Patient continues to be visible in the milieu and interacts with staff and peers. Received PRNs of Zyprexa on Saturday for agitated behavior but did not eat anything yesterday..    Patient accepted all offered medications and no adverse effects of medications noted or reported.    Objective    Current Mental Status Evaluation:  Appearance: casually dressed, consistent with stated age  Motor: no psychomotor retardation, no gait abnormalities  Behavior: cooperative, answers questions appropriately  Speech: soft, normal rhythm  Mood: \"good\"  Affect: constricted, depressed-appearing  Thought Process: linear and goal-oriented  Thought Content: denies delusions  Risk Potential: denies suicidal ideation, plan, or intent. Denies homicidal ideation  Perceptions: denies auditory hallucinations, denies visual hallucinations,   Sensorium: Oriented to person, place, time, and situation  Cognition: cognitive ability appears intact but was not quantitatively tested  Consciousness: alert and awake  Attention: intact, able to focus without difficulty  Insight: limited  Judgement: limited            Vital signs in last 24 hours:    Temp:  [97.2 °F (36.2 °C)-97.5 °F (36.4 °C)] 97.2 °F (36.2 °C)  HR:  [69-86] 69  BP: (115-121)/(63-65) 115/63  SpO2:  [97 %-98 %] 98 %  O2 Device: None (Room air)    Psychiatric Review of Systems:  Medication adverse effects: none  Sleep: unchanged  Appetite: unchanged  Behaviors over the past 24 hours: unchanged    Laboratory results:    I have personally reviewed all pertinent laboratory/tests results  No results found for this or any previous visit (from the past 48 hours).       Progress Toward Goals & Illness Status: "   progressing, mood is stabilizing, discharge planning    Patient is not at goal. They are not yet ready for discharge. The patient's condition currently requires active psychopharmacological medication management, interdisciplinary coordination with case management, and the utilization of adjunctive milieu and group therapy to augment psychopharmacological efficacy. The patient's risk of morbidity, and progression or decompensation of psychiatric disease, is higher without this current treatment.     Next of Kin  Extended Emergency Contact Information  Primary Emergency Contact: Lois Roberson  Address: [address] [place] [address] [address]  Home Phone: [phone]  Relation: Mother    Counseling / Coordination of Care  Patient's progress discussed with staff in treatment team meeting.  Medications, treatment progress and treatment plan reviewed with patient.  Medication education provided to patient.  Educated on importance of medication and treatment compliance.  Supportive therapy provided to patient.  Cognitive techniques utilized during the session.  Reassurance and supportive therapy provided.  Reoriented to reality and reassured.  Encouraged participation in milieu and group therapy on the unit.  Crisis/safety plan discussed with patient.       Dustin Mcallister MD  Attending Psychiatrist   [facility]      This note has been constructed using a voice recognition system. There may be translation, syntax, or grammatical errors. If you have any questions, please contact the dictating provider.

## 2024-12-23 NOTE — PLAN OF CARE
Problem: Ineffective Coping  Goal: Participates in unit activities  Description: Interventions:  - Provide therapeutic environment   - Provide required programming   - Redirect inappropriate behaviors   Outcome: Progressing  Note: Intermittently     Problem: Depression  Goal: Treatment Goal: Demonstrate behavioral control of depressive symptoms, verbalize feelings of improved mood/affect, and adopt new coping skills prior to discharge  Outcome: Progressing  Goal: Verbalize thoughts and feelings  Description: Interventions:  - Assess and re-assess patient's level of risk   - Engage patient in 1:1 interactions, daily, for a minimum of 15 minutes   - Encourage patient to express feelings, fears, frustrations, hopes   Outcome: Progressing  Goal: Refrain from harming self  Description: Interventions:  - Monitor patient closely, per order   - Supervise medication ingestion, monitor effects and side effects   Outcome: Progressing  Goal: Refrain from isolation  Description: Interventions:  - Develop a trusting relationship   - Encourage socialization   Outcome: Progressing     Problem: Anxiety  Goal: Anxiety is at manageable level  Description: Interventions:  - Assess and monitor patient's anxiety level.   - Monitor for signs and symptoms (heart palpitations, chest pain, shortness of breath, headaches, nausea, feeling jumpy, restlessness, irritable, apprehensive).   - Collaborate with interdisciplinary team and initiate plan and interventions as ordered.  - Holden patient to unit/surroundings  - Explain treatment plan  - Encourage participation in care  - Encourage verbalization of concerns/fears  - Identify coping mechanisms  - Assist in developing anxiety-reducing skills  - Administer/offer alternative therapies  - Limit or eliminate stimulants  Outcome: Progressing     Problem: DISCHARGE PLANNING - CARE MANAGEMENT  Goal: Discharge to post-acute care or home with appropriate resources  Description: INTERVENTIONS:  -  Conduct assessment to determine patient/family and health care team treatment goals, and need for post-acute services based on payer coverage, community resources, and patient preferences, and barriers to discharge  - Address psychosocial, clinical, and financial barriers to discharge as identified in assessment in conjunction with the patient/family and health care team  - Arrange appropriate level of post-acute services according to patient’s   needs and preference and payer coverage in collaboration with the physician and health care team  - Communicate with and update the patient/family, physician, and health care team regarding progress on the discharge plan  - Arrange appropriate transportation to post-acute venues  Outcome: Progressing     Problem: Knowledge Deficit  Goal: Patient/family/caregiver demonstrates understanding of disease process, treatment plan, medications, and discharge instructions  Description: Complete learning assessment and assess knowledge base.  Interventions:  - Provide teaching at level of understanding  - Provide teaching via preferred learning methods  Outcome: Progressing     Problem: SLEEP DISTURBANCE  Goal: Will exhibit normal sleeping pattern  Description: Interventions:  -  Assess the patients sleep pattern, noting recent changes  - Administer medication as ordered  - Decrease environmental stimuli, including noise, as appropriate during the night  - Encourage the patient to actively participate in unit groups and or exercise during the day to enhance ability to achieve adequate sleep at night  - Assess the patient, in the morning, encouraging a description of sleep experience  Outcome: Progressing

## 2024-12-23 NOTE — ASSESSMENT & PLAN NOTE
"Patient continues to be pending group home placement. Otherwise no clinical significant change.  Pt and SW able to have meeting w/ [facility] on [place]. Waiting for follow up from [facility] by mail.    Abilify 5 mg daily as mood adjunct   Zoloft 150 mg daily for depression and anxiety  Continue to encourage participation in group therapy, milieu therapy and occupational therapy.  Continue to assess for side effects of medications.  Continue collaboration with [facility] for medical co-morbidities as indicated.  Continue discussion with CM/SW to assist with obtaining collateral, disposition planning, and the implementation of patient-centered individualized plan of care.  Continue frequent safety checks and vitals per unit protocol.    Risks, benefits and possible side effects of Medications: Risks, benefits, and possible side effects of medications have previously been explained. No new medications at this time.      Legal status: 201    Disposition: to be determined, pending SOAR application for potential group home placement. OT Cognitive Evaluation completed: \"Pt would benefit from discharge to a supportive environment that can provide checks for safety and compliance with IADLs. \"   Although patient's mood has stabilized, they are currently awaiting group home placement.  Their ability to recognize safety hazards take medications and participate in IADLs are significantly impaired by their cognitive deficits compounded by their chronic mental health needs and would be at risk for significant decompensation without the structure and support of a group home setting.      No associated orders from this encounter found during lookback period of 72 hours.  "

## 2024-12-23 NOTE — NURSING NOTE
Pt is withdrawn to room. Pt is cooperative and med compliant. Pt denies depression and anxiety. Pt denies SI/HI and AH/VH. No unmet needs reported. Will continue to monitor.

## 2024-12-24 PROCEDURE — 99232 SBSQ HOSP IP/OBS MODERATE 35: CPT | Performed by: PSYCHIATRY & NEUROLOGY

## 2024-12-24 RX ADMIN — ARIPIPRAZOLE 5 MG: 5 TABLET ORAL at 08:23

## 2024-12-24 RX ADMIN — Medication 2500 UNITS: at 08:23

## 2024-12-24 RX ADMIN — SERTRALINE HYDROCHLORIDE 150 MG: 100 TABLET ORAL at 08:23

## 2024-12-24 RX ADMIN — FAMOTIDINE 20 MG: 20 TABLET ORAL at 08:23

## 2024-12-24 RX ADMIN — LEVOTHYROXINE SODIUM 37.5 MCG: 125 TABLET ORAL at 06:24

## 2024-12-24 RX ADMIN — CYANOCOBALAMIN TAB 1000 MCG 1000 MCG: 1000 TAB at 08:24

## 2024-12-24 RX ADMIN — SENNOSIDES AND DOCUSATE SODIUM 2 TABLET: 50; 8.6 TABLET ORAL at 17:15

## 2024-12-24 RX ADMIN — FAMOTIDINE 20 MG: 20 TABLET ORAL at 17:15

## 2024-12-24 RX ADMIN — ATORVASTATIN CALCIUM 10 MG: 10 TABLET, FILM COATED ORAL at 17:15

## 2024-12-24 NOTE — PROGRESS NOTES
"Progress Note - Behavioral Health   Name: Franco Roberson 39 y.o. male I MRN: [id number]  Unit/Bed#: -01 I Date of Admission: 5/14/2024   Date of Service: 12/24/2024 I Hospital Day: 224     Assessment & Plan  MDD (major depressive disorder), recurrent severe, without psychosis (HCC)  Patient continues to be pending group home placement. Otherwise no clinical significant change.  Pt and SW able to have meeting w/ [facility] on 12/13. Waiting for follow up from [facility] by mail.    Abilify 5 mg daily as mood adjunct   Zoloft 150 mg daily for depression and anxiety  Continue to encourage participation in group therapy, milieu therapy and occupational therapy.  Continue to assess for side effects of medications.  Continue collaboration with [facility] for medical co-morbidities as indicated.  Continue discussion with CM/SW to assist with obtaining collateral, disposition planning, and the implementation of patient-centered individualized plan of care.  Continue frequent safety checks and vitals per unit protocol.    Risks, benefits and possible side effects of Medications: Risks, benefits, and possible side effects of medications have previously been explained. No new medications at this time.      Legal status: 201    Disposition: to be determined, pending SOAR application for potential group home placement. OT Cognitive Evaluation completed: \"Pt would benefit from discharge to a supportive environment that can provide checks for safety and compliance with IADLs. \"   Although patient's mood has stabilized, they are currently awaiting group home placement.  Their ability to recognize safety hazards take medications and participate in IADLs are significantly impaired by their cognitive deficits compounded by their chronic mental health needs and would be at risk for significant decompensation without the structure and support of a group home setting.      No associated orders from this encounter found during lookback period of 72 " hours.  Autism spectrum disorder  Continue supportive care    No associated orders from this encounter found during lookback period of 72 hours.        Plan     Recommended Treatment:    - Encourage early mobility and having a structured day  - Provide frequent re-orientation, and cognitive stimulation  - Ensure assistive devices are in proper working order (eye-glasses, hearing aids)  - Encourage adequate hydration, nutrition and monitor bowel movements  - Maintain sleep-wake cycle: Uninterrupted sleep time; low-level lighting at night  - Fall precaution  - f/u [facility] recs regarding the medical problems   - Continue medication titration and treatment plan; adjust medication to optimize treatment response and as clinically indicated. .   - Observation: routine            - VS: as per unit protocol  - Diet: Regular diet  - Encourage group attendance and milieu therapy  - Dispo: To be determined     Scheduled medications:  Current Facility-Administered Medications   Medication Dose Route Frequency Provider Last Rate    acetaminophen  650 mg Oral Q6H PRN Basilio Brown MD      acetaminophen  650 mg Oral Q4H PRN Basilio Brown MD      acetaminophen  975 mg Oral Q6H PRN Basilio Brown MD      aluminum-magnesium hydroxide-simethicone  30 mL Oral Q4H PRN Basilio Brown MD      ARIPiprazole  5 mg Oral Daily Basilio Panda MD      Artificial Tears  1 drop Both Eyes Q3H PRN Basilio Brown MD      atorvastatin  10 mg Oral Daily With Dinner WALLY Baptiste      benztropine  1 mg Intramuscular Q4H PRN Max 6/day Basilio Brown MD      benztropine  1 mg Oral Q4H PRN Max 6/day Basilio Brown MD      Cholecalciferol  2,500 Units Oral Daily Dustin Mcallister MD      cyanocobalamin  1,000 mcg Oral Daily WALLY Baptiste      Diclofenac Sodium  2 g Topical 4x Daily PRN WALLY Baptiste      hydrOXYzine HCL  50 mg Oral Q6H PRN Max 4/day Basilio Brown MD       Or    diphenhydrAMINE  50 mg Intramuscular Q6H PRN Basilio Brown MD      diphenhydrAMINE-zinc acetate   Topical Daily PRN Raj Guerrero MD      famotidine  20 mg Oral BID Laura Amber Ashford, WALLY      hydrOXYzine HCL  100 mg Oral Q6H PRN Max 4/day Basilio Brown MD      Or    LORazepam  2 mg Intramuscular Q6H PRN Basilio Brown MD      hydrOXYzine HCL  25 mg Oral Q6H PRN Max 4/day Basilio Brown MD      levothyroxine  37.5 mcg Oral Early Morning Laura Ashford, WALLY      melatonin  6 mg Oral HS PRN WALLY Gomez      methocarbamol  500 mg Oral Q6H PRN WALLY Baptiste      OLANZapine  5 mg Oral Q4H PRN Max 3/day Basilio Brown MD      Or    OLANZapine  2.5 mg Intramuscular Q4H PRN Max 3/day Basilio Brown MD      OLANZapine  5 mg Oral Q3H PRN Max 3/day Basilio Brown MD      Or    OLANZapine  5 mg Intramuscular Q3H PRN Max 3/day Basilio Brown MD      OLANZapine  2.5 mg Oral Q4H PRN Max 6/day Basilio Brown MD      polyethylene glycol  17 g Oral Daily PRN Basilio Brown MD      propranolol  10 mg Oral Q8H PRN Basilio Brown MD      senna-docusate sodium  2 tablet Oral After Dinner Dustin Mcallister MD      sertraline  150 mg Oral Daily WALLY Gomez      traZODone  50 mg Oral HS PRN Basilio Brown MD        PRN:    acetaminophen    acetaminophen    acetaminophen    aluminum-magnesium hydroxide-simethicone    Artificial Tears    benztropine    benztropine    Diclofenac Sodium    hydrOXYzine HCL **OR** diphenhydrAMINE    diphenhydrAMINE-zinc acetate    hydrOXYzine HCL **OR** LORazepam    hydrOXYzine HCL    melatonin    methocarbamol    OLANZapine **OR** OLANZapine    OLANZapine **OR** OLANZapine    OLANZapine    polyethylene glycol    propranolol    traZODone       Subjective     Patient was visited on unit for continuing care; chart reviewed and discussed with multidisciplinary treatment team.  On approach,  "the patient was calm and cooperative. Denied any changes in mood, appetite, and energy level.  No acute physical complaints or concerns No problem initiating and maintaining sleep.  Denied A/VH currently.  Denied SI/HI, intent or plan upon direct inquiry at this time.    Patient continues to be visible in the milieu and interacts with staff and peers. No reports of aggression or self-injurious behavior on unit. No PRN medications used in the past 24 hours.    Patient accepted all offered medications and no adverse effects of medications noted or reported.    Objective    Current Mental Status Evaluation:  Mental Status Exam  Appearance: casually dressed, consistent with stated age  Motor: no psychomotor retardation, no gait abnormalities  Behavior: cooperative, answers questions appropriately  Speech: soft, normal rhythm  Mood: \"alright\"  Affect: slightly brighter today  Thought Process: linear and goal-oriented  Thought Content: denies delusions or paranoia  Risk Potential: denies suicidal ideation, plan, or intent. Denies homicidal ideation  Perceptions: denies auditory hallucinations, denies visual hallucinations,   Sensorium: Oriented to person, place, time, and situation  Cognition: cognitive ability appears intact but was not quantitatively tested  Consciousness: alert and awake  Attention: intact, able to focus without difficulty  Insight: limited  Judgement: limited            Vital signs in last 24 hours:    Temp:  [96.7 °F (35.9 °C)-96.8 °F (36 °C)] 96.8 °F (36 °C)  HR:  [74-79] 74  BP: ()/(63-72) 99/63  Resp:  [16] 16  SpO2:  [96 %-98 %] 98 %  O2 Device: None (Room air)    Psychiatric Review of Systems:  Medication adverse effects: none  Sleep: unchanged  Appetite: unchanged  Behaviors over the past 24 hours: unchanged    Laboratory results:    I have personally reviewed all pertinent laboratory/tests results  No results found for this or any previous visit (from the past 48 hours).       Progress " Toward Goals & Illness Status:   progressing, participates in milieu therapy, mood is stabilizing, discharge planning, placement pending    Patient is not at goal. They are not yet ready for discharge. The patient's condition currently requires active psychopharmacological medication management, interdisciplinary coordination with case management, and the utilization of adjunctive milieu and group therapy to augment psychopharmacological efficacy. The patient's risk of morbidity, and progression or decompensation of psychiatric disease, is higher without this current treatment.     Next of Kin  Extended Emergency Contact Information  Primary Emergency Contact: Lois Roberson  Address: [address]           [address] [place] [address]  Home Phone: [phone]  Relation: Mother    Counseling / Coordination of Care  Patient's progress discussed with staff in treatment team meeting.  Medications, treatment progress and treatment plan reviewed with patient.  Medication education provided to patient.  Educated on importance of medication and treatment compliance.  Supportive therapy provided to patient.  Cognitive techniques utilized during the session.  Reassurance and supportive therapy provided.  Reoriented to reality and reassured.  Encouraged participation in milieu and group therapy on the unit.  Crisis/safety plan discussed with patient.       Dustin Mcallister MD  Attending Psychiatrist   [facility]      This note has been constructed using a voice recognition system. There may be translation, syntax, or grammatical errors. If you have any questions, please contact the dictating provider.

## 2024-12-24 NOTE — NURSING NOTE
Pt pleasant and cooperative. Brightens on approach. Visible on the unit periodically. Denies SI/HI/AH/VH. Medication and meal compliant. Verbalizes needs.

## 2024-12-24 NOTE — ASSESSMENT & PLAN NOTE
"Patient continues to be pending group home placement. Otherwise no clinical significant change.  Pt and SW able to have meeting w/ [facility] on 12/13. Waiting for follow up from [facility] by mail.    Abilify 5 mg daily as mood adjunct   Zoloft 150 mg daily for depression and anxiety  Continue to encourage participation in group therapy, milieu therapy and occupational therapy.  Continue to assess for side effects of medications.  Continue collaboration with [facility] for medical co-morbidities as indicated.  Continue discussion with CM/SW to assist with obtaining collateral, disposition planning, and the implementation of patient-centered individualized plan of care.  Continue frequent safety checks and vitals per unit protocol.    Risks, benefits and possible side effects of Medications: Risks, benefits, and possible side effects of medications have previously been explained. No new medications at this time.      Legal status: 201    Disposition: to be determined, pending [facility] application for potential group home placement. OT Cognitive Evaluation completed: \"Pt would benefit from discharge to a supportive environment that can provide checks for safety and compliance with IADLs. \"   Although patient's mood has stabilized, they are currently awaiting group home placement.  Their ability to recognize safety hazards take medications and participate in IADLs are significantly impaired by their cognitive deficits compounded by their chronic mental health needs and would be at risk for significant decompensation without the structure and support of a group home setting.      No associated orders from this encounter found during lookback period of 72 hours.  "

## 2024-12-24 NOTE — PLAN OF CARE
Problem: Ineffective Coping  Goal: Participates in unit activities  Description: Interventions:  - Provide therapeutic environment   - Provide required programming   - Redirect inappropriate behaviors   Outcome: Progressing     Problem: Depression  Goal: Treatment Goal: Demonstrate behavioral control of depressive symptoms, verbalize feelings of improved mood/affect, and adopt new coping skills prior to discharge  Outcome: Progressing  Goal: Verbalize thoughts and feelings  Description: Interventions:  - Assess and re-assess patient's level of risk   - Engage patient in 1:1 interactions, daily, for a minimum of 15 minutes   - Encourage patient to express feelings, fears, frustrations, hopes   Outcome: Progressing  Goal: Refrain from harming self  Description: Interventions:  - Monitor patient closely, per order   - Supervise medication ingestion, monitor effects and side effects   Outcome: Progressing  Goal: Refrain from isolation  Description: Interventions:  - Develop a trusting relationship   - Encourage socialization   Outcome: Progressing     Problem: Anxiety  Goal: Anxiety is at manageable level  Description: Interventions:  - Assess and monitor patient's anxiety level.   - Monitor for signs and symptoms (heart palpitations, chest pain, shortness of breath, headaches, nausea, feeling jumpy, restlessness, irritable, apprehensive).   - Collaborate with interdisciplinary team and initiate plan and interventions as ordered.  - [place] patient to unit/surroundings  - Explain treatment plan  - Encourage participation in care  - Encourage verbalization of concerns/fears  - Identify coping mechanisms  - Assist in developing anxiety-reducing skills  - Administer/offer alternative therapies  - Limit or eliminate stimulants  Outcome: Progressing     Problem: Knowledge Deficit  Goal: Patient/family/caregiver demonstrates understanding of disease process, treatment plan, medications, and discharge  instructions  Description: Complete learning assessment and assess knowledge base.  Interventions:  - Provide teaching at level of understanding  - Provide teaching via preferred learning methods  Outcome: Progressing     Problem: SLEEP DISTURBANCE  Goal: Will exhibit normal sleeping pattern  Description: Interventions:  -  Assess the patients sleep pattern, noting recent changes  - Administer medication as ordered  - Decrease environmental stimuli, including noise, as appropriate during the night  - Encourage the patient to actively participate in unit groups and or exercise during the day to enhance ability to achieve adequate sleep at night  - Assess the patient, in the morning, encouraging a description of sleep experience  Outcome: Progressing

## 2024-12-25 PROCEDURE — 99232 SBSQ HOSP IP/OBS MODERATE 35: CPT | Performed by: PSYCHIATRY & NEUROLOGY

## 2024-12-25 RX ADMIN — Medication 2500 UNITS: at 08:41

## 2024-12-25 RX ADMIN — ARIPIPRAZOLE 5 MG: 5 TABLET ORAL at 08:40

## 2024-12-25 RX ADMIN — SERTRALINE HYDROCHLORIDE 150 MG: 100 TABLET ORAL at 08:40

## 2024-12-25 RX ADMIN — CYANOCOBALAMIN TAB 1000 MCG 1000 MCG: 1000 TAB at 08:40

## 2024-12-25 RX ADMIN — FAMOTIDINE 20 MG: 20 TABLET ORAL at 17:16

## 2024-12-25 RX ADMIN — SENNOSIDES AND DOCUSATE SODIUM 2 TABLET: 50; 8.6 TABLET ORAL at 17:16

## 2024-12-25 RX ADMIN — LEVOTHYROXINE SODIUM 37.5 MCG: 125 TABLET ORAL at 05:26

## 2024-12-25 RX ADMIN — ATORVASTATIN CALCIUM 10 MG: 10 TABLET, FILM COATED ORAL at 17:16

## 2024-12-25 RX ADMIN — FAMOTIDINE 20 MG: 20 TABLET ORAL at 08:40

## 2024-12-25 NOTE — NURSING NOTE
Pt withdrawn to self most of the day. Social periodically with select peers. Medication and meal compliant. Encouraged to attend groups. Pt declined any holiday cookies or hot chocolate, appearing depressed despite saying he feels fine. Denies SI/HI/AH/VH. ADLs encouraged. Denies any unmet needs or complaints at this time.

## 2024-12-25 NOTE — ASSESSMENT & PLAN NOTE
"Patient continues to be pending group home placement. Otherwise no clinical significant change.  Pt and SW able to have meeting w/ [facility] on 12/13. Waiting for follow up from [facility] by mail.    Medication regimen as follows, no changes as of 12/25/2024:  Abilify 5 mg daily as mood adjunct   Zoloft 150 mg daily for depression and anxiety  Continue to encourage participation in group therapy, milieu therapy and occupational therapy.  Continue to assess for side effects of medications.  Continue collaboration with [facility] for medical co-morbidities as indicated.  Continue discussion with CM/SW to assist with obtaining collateral, disposition planning, and the implementation of patient-centered individualized plan of care.  Continue frequent safety checks and vitals per unit protocol.    Risks, benefits and possible side effects of Medications: Risks, benefits, and possible side effects of medications have previously been explained. No new medications at this time.      Legal status: 201    Disposition: to be determined, pending [facility]AR application for potential group home placement. OT Cognitive Evaluation completed: \"Pt would benefit from discharge to a supportive environment that can provide checks for safety and compliance with IADLs. \"   Although patient's mood has stabilized, they are currently awaiting group home placement.  Their ability to recognize safety hazards take medications and participate in IADLs are significantly impaired by their cognitive deficits compounded by their chronic mental health needs and would be at risk for significant decompensation without the structure and support of a group home setting.      No associated orders from this encounter found during lookback period of 72 hours.  "

## 2024-12-25 NOTE — PROGRESS NOTES
"Progress Note - Behavioral Health   Name: Franco Roberson 39 y.o. male I MRN: [id number]  Unit/Bed#: -01 I Date of Admission: 5/14/2024   Date of Service: 12/25/2024 I Hospital Day: 225     Assessment & Plan  MDD (major depressive disorder), recurrent severe, without psychosis (HCC)  Patient continues to be pending group home placement. Otherwise no clinical significant change.  Pt and SW able to have meeting w/ [facility] on 12/13. [place] [facility] [place]    Medication regimen as follows, no changes as of 12/25/2024:  Abilify 5 mg daily as mood adjunct   Zoloft 150 mg daily for depression and anxiety  Continue to encourage participation in group therapy, milieu therapy and occupational therapy.  Continue to assess for side effects of medications.  Continue collaboration with [facility] for medical co-morbidities as indicated.  Continue discussion with CM/SW to assist with obtaining collateral, disposition planning, and the implementation of patient-centered individualized plan of care.  Continue frequent safety checks and vitals per unit protocol.    Risks, benefits and possible side effects of Medications: Risks, benefits, and possible side effects of medications have previously been explained. No new medications at this time.      Legal status: 201    Disposition: to be determined, pending SOAR application for potential group home placement. OT Cognitive Evaluation completed: \"Pt would benefit from discharge to a supportive environment that can provide checks for safety and compliance with IADLs. \"   Although patient's mood has stabilized, they are currently awaiting group home placement.  Their ability to recognize safety hazards take medications and participate in IADLs are significantly impaired by their cognitive deficits compounded by their chronic mental health needs and would be at risk for significant decompensation without the structure and support of a group home setting.      No associated orders " "from this encounter found during lookback period of 72 hours.  Autism spectrum disorder  Continue supportive care    No associated orders from this encounter found during lookback period of 72 hours.          ------------------------------------------------------------    Recommended Treatment Plan:   Behavioral checks every 15 minutes  Encourage participation in group therapy, milieu therapy and occupational therapy.  Assess for side effects of medications.  Collaboration with PATR[facility] for medical co-morbidities as indicated.  Continue discussion with CM/SW to assist with obtaining collateral, disposition planning, and the implementation of patient-centered individualized plan of care.  Estimated Discharge Date: 1/31/2025    Risks, benefits and possible side effects of Medications: Risks, benefits, and possible side effects of medications have previously been explained. No new medications at this time.    Legal status: 201    Disposition: pending placement      Subjective: Patient's chart was reviewed. Patient's progress and plan was discussed with treatment team. Per nursing report, Franco has been visible and social on the unit, appearing dysphoric at times. He remains compliant with medications.     Prn medications over the past 24 hours: None    Franco was evaluated this morning for continuity of care. On examination, Franco is seen while walking in the hallway. He states his mood is \"okay.\" He reports sleeping fine. His appetite has been normal. He denies adverse effects from medications. He denies active or passive suicidal ideation and homicidal ideation. He denies auditory or visual hallucinations.    VS: Reviewed, within normal limits    Progress Toward Goals: medication and meal compliant, calm, cooperative, appears at baseline but is awaiting placement due to risk of decompensation if discharged to unstructured environment     Psychiatric Review of Systems:  Behavior over the last 24 hours: unchanged  Sleep: " "normal  Appetite: adequate  Medication side effects: none verbalized  Medical ROS: Complete review of systems is negative except as noted above.    Vital signs in last 24 hours:  Temp:  [97.4 °F (36.3 °C)-97.8 °F (36.6 °C)] 97.4 °F (36.3 °C)  HR:  [66-73] 73  BP: (111-124)/(69-76) 111/69  Resp:  [16] 16  SpO2:  [96 %-98 %] 96 %  O2 Device: None (Room air)    Mental Status Exam:    Appearance:  alert, disheveled , greasy black hair , casually dressed, poor eye contact , appears older than stated age, obese, and tall   Behavior:  calm and cooperative   Speech:  Spontaneous, soft, normal rate, scant   Mood:  \"Okay\"   Affect:  Dysphoric   Thought Process:  Organized, logical, goal-directed   Associations: intact associations   Thought Content:  no verbalized delusions or overt paranoia   Perceptual Disturbances: denies current auditory or visual hallucinations and does not appear to be responding to internal stimuli at this time   Risk Potential: Suicidal ideation -  Denies at present   Homicidal ideation - Denies at present  Potential for aggression - No   Sensorium:  oriented to person, place, and time/date   Memory:  recent and remote memory grossly intact   Consciousness:  alert and awake   Attention/Concentration: attention span and concentration are age appropriate   Insight:  fair   Judgment: fair   Gait/Station: normal gait/station, normal balance   Motor Activity: no abnormal movements     Current Medications:  Current Facility-Administered Medications   Medication Dose Route Frequency Provider Last Rate    acetaminophen  650 mg Oral Q6H PRN Basilio Brown MD      acetaminophen  650 mg Oral Q4H PRN Basilio Brown MD      acetaminophen  975 mg Oral Q6H PRN Basilio Brown MD      aluminum-magnesium hydroxide-simethicone  30 mL Oral Q4H PRN Basilio Brown MD      ARIPiprazole  5 mg Oral Daily Basilio Panda MD      Artificial Tears  1 drop Both Eyes Q3H PRN Basilio Brown MD      " atorvastatin  10 mg Oral Daily With Dinner WALLY Baptiste      benztropine  1 mg Intramuscular Q4H PRN Max 6/day Basilio Brown MD      benztropine  1 mg Oral Q4H PRN Max 6/day Basilio Brown MD      Cholecalciferol  2,500 Units Oral Daily Dustin Mcallister MD      cyanocobalamin  1,000 mcg Oral Daily Laura RomeWALLY Jacobson      Diclofenac Sodium  2 g Topical 4x Daily PRN LauraWALLY Tony      hydrOXYzine HCL  50 mg Oral Q6H PRN Max 4/day Basilio Brown MD      Or    diphenhydrAMINE  50 mg Intramuscular Q6H PRN Basilio Brown MD      diphenhydrAMINE-zinc acetate   Topical Daily PRN Raj Guerrero MD      famotidine  20 mg Oral BID Laura Amber Ashford, WALLY      hydrOXYzine HCL  100 mg Oral Q6H PRN Max 4/day Basilio Brown MD      Or    LORazepam  2 mg Intramuscular Q6H PRN Basilio Brown MD      hydrOXYzine HCL  25 mg Oral Q6H PRN Max 4/day Basilio Brown MD      levothyroxine  37.5 mcg Oral Early Morning WALLY Baptiste      melatonin  6 mg Oral HS PRN WALLY Gomez      methocarbamol  500 mg Oral Q6H PRN WALLY Baptiste      OLANZapine  5 mg Oral Q4H PRN Max 3/day Basilio Brown MD      Or    OLANZapine  2.5 mg Intramuscular Q4H PRN Max 3/day Basilio Brown MD      OLANZapine  5 mg Oral Q3H PRN Max 3/day Basilio Brown MD      Or    OLANZapine  5 mg Intramuscular Q3H PRN Max 3/day Basilio Brown MD      OLANZapine  2.5 mg Oral Q4H PRN Max 6/day Basilio Brown MD      polyethylene glycol  17 g Oral Daily PRN Basilio Brown MD      propranolol  10 mg Oral Q8H PRN Basilio Brown MD      senna-docusate sodium  2 tablet Oral After Dinner Dustin Mcallister MD      sertraline  150 mg Oral Daily WALLY Gomez      traZODone  50 mg Oral HS PRN Basilio Brown MD         Behavioral Health Medications: all current active meds have been reviewed. Changes as in plan section  above.    Laboratory results:  I have personally reviewed all pertinent laboratory/tests results.   No results found for this or any previous visit (from the past 48 hours).     Counseling / Coordination of Care:  Patient's progress discussed with staff in treatment team meeting.  Encouraged participation in milieu and group therapy on the unit.      Felicita Ortiz DO 12/25/24  Psychiatry Residency, PGY-II  This note has been constructed using a voice recognition system. There may be translation, syntax, or grammatical errors. If you have any questions, please contact the dictating author.

## 2024-12-25 NOTE — PLAN OF CARE
Problem: Ineffective Coping  Goal: Participates in unit activities  Description: Interventions:  - Provide therapeutic environment   - Provide required programming   - Redirect inappropriate behaviors   Outcome: Progressing     Problem: Depression  Goal: Treatment Goal: Demonstrate behavioral control of depressive symptoms, verbalize feelings of improved mood/affect, and adopt new coping skills prior to discharge  Outcome: Progressing  Goal: Verbalize thoughts and feelings  Description: Interventions:  - Assess and re-assess patient's level of risk   - Engage patient in 1:1 interactions, daily, for a minimum of 15 minutes   - Encourage patient to express feelings, fears, frustrations, hopes   Outcome: Progressing  Goal: Refrain from harming self  Description: Interventions:  - Monitor patient closely, per order   - Supervise medication ingestion, monitor effects and side effects   Outcome: Progressing  Goal: Refrain from isolation  Description: Interventions:  - Develop a trusting relationship   - Encourage socialization   Outcome: Progressing     Problem: Anxiety  Goal: Anxiety is at manageable level  Description: Interventions:  - Assess and monitor patient's anxiety level.   - Monitor for signs and symptoms (heart palpitations, chest pain, shortness of breath, headaches, nausea, feeling jumpy, restlessness, irritable, apprehensive).   - Collaborate with interdisciplinary team and initiate plan and interventions as ordered.  - [place] patient to unit/surroundings  - Explain treatment plan  - Encourage participation in care  - Encourage verbalization of concerns/fears  - Identify coping mechanisms  - Assist in developing anxiety-reducing skills  - Administer/offer alternative therapies  - Limit or eliminate stimulants  Outcome: Progressing     Problem: DISCHARGE PLANNING - CARE MANAGEMENT  Goal: Discharge to post-acute care or home with appropriate resources  Description: INTERVENTIONS:  - Conduct assessment to  determine patient/family and health care team treatment goals, and need for post-acute services based on payer coverage, community resources, and patient preferences, and barriers to discharge  - Address psychosocial, clinical, and financial barriers to discharge as identified in assessment in conjunction with the patient/family and health care team  - Arrange appropriate level of post-acute services according to patient’s   needs and preference and payer coverage in collaboration with the physician and health care team  - Communicate with and update the patient/family, physician, and health care team regarding progress on the discharge plan  - Arrange appropriate transportation to post-acute venues  Outcome: Not Progressing     Problem: Knowledge Deficit  Goal: Patient/family/caregiver demonstrates understanding of disease process, treatment plan, medications, and discharge instructions  Description: Complete learning assessment and assess knowledge base.  Interventions:  - Provide teaching at level of understanding  - Provide teaching via preferred learning methods  Outcome: Progressing     Problem: SLEEP DISTURBANCE  Goal: Will exhibit normal sleeping pattern  Description: Interventions:  -  Assess the patients sleep pattern, noting recent changes  - Administer medication as ordered  - Decrease environmental stimuli, including noise, as appropriate during the night  - Encourage the patient to actively participate in unit groups and or exercise during the day to enhance ability to achieve adequate sleep at night  - Assess the patient, in the morning, encouraging a description of sleep experience  Outcome: Progressing

## 2024-12-25 NOTE — NURSING NOTE
Pt calm and cooperative, visible and social during group. appears depressed denies all psych symptoms. Compliant with medication administration denies any unmet needs.

## 2024-12-26 PROCEDURE — 99232 SBSQ HOSP IP/OBS MODERATE 35: CPT | Performed by: PSYCHIATRY & NEUROLOGY

## 2024-12-26 RX ADMIN — ATORVASTATIN CALCIUM 10 MG: 10 TABLET, FILM COATED ORAL at 17:13

## 2024-12-26 RX ADMIN — CYANOCOBALAMIN TAB 1000 MCG 1000 MCG: 1000 TAB at 08:08

## 2024-12-26 RX ADMIN — FAMOTIDINE 20 MG: 20 TABLET ORAL at 08:08

## 2024-12-26 RX ADMIN — SENNOSIDES AND DOCUSATE SODIUM 2 TABLET: 50; 8.6 TABLET ORAL at 17:14

## 2024-12-26 RX ADMIN — LEVOTHYROXINE SODIUM 37.5 MCG: 125 TABLET ORAL at 06:19

## 2024-12-26 RX ADMIN — ARIPIPRAZOLE 5 MG: 5 TABLET ORAL at 08:08

## 2024-12-26 RX ADMIN — Medication 2500 UNITS: at 08:08

## 2024-12-26 RX ADMIN — SERTRALINE HYDROCHLORIDE 150 MG: 100 TABLET ORAL at 08:08

## 2024-12-26 RX ADMIN — FAMOTIDINE 20 MG: 20 TABLET ORAL at 17:14

## 2024-12-26 NOTE — ASSESSMENT & PLAN NOTE
"Patient continues to be pending group home placement. Otherwise no clinical significant change.  Pt and SW able to have meeting w/ [facility] on 12/13. Waiting for follow up from [facility] [place] by mail.    Medication regimen as follows, no changes as of 12/26/2024:  Abilify 5 mg daily as mood adjunct   Zoloft 150 mg daily for depression and anxiety  Continue to encourage participation in group therapy, milieu therapy and occupational therapy.  Continue to assess for side effects of medications.  Continue collaboration with [facility] for medical co-morbidities as indicated.  Continue discussion with CM/SW to assist with obtaining collateral, disposition planning, and the implementation of patient-centered individualized plan of care.  Continue frequent safety checks and vitals per unit protocol.    Risks, benefits and possible side effects of Medications: Risks, benefits, and possible side effects of medications have previously been explained. No new medications at this time.      Legal status: 201    Disposition: to be determined, pending SOAR application for potential group home placement. OT Cognitive Evaluation completed: \"Pt would benefit from discharge to a supportive environment that can provide checks for safety and compliance with IADLs. \"   Although patient's mood has stabilized, they are currently awaiting group home placement.  Their ability to recognize safety hazards take medications and participate in IADLs are significantly impaired by their cognitive deficits compounded by their chronic mental health needs and would be at risk for significant decompensation without the structure and support of a group home setting.      No associated orders from this encounter found during lookback period of 72 hours.  "

## 2024-12-26 NOTE — PROGRESS NOTES
"Progress Note - Behavioral Health   Name: Franco Roberson 39 y.o. male I MRN: [id number]  Unit/Bed#: -01 I Date of Admission: 5/14/2024   Date of Service: 12/26/2024 I Hospital Day: 226     Assessment & Plan  MDD (major depressive disorder), recurrent severe, without psychosis (HCC)  Patient continues to be pending group home placement. Otherwise no clinical significant change.  Pt and SW able to have meeting w/ [facility] on 12/13. Waiting for follow up from [facility] [place] by mail.    Medication regimen as follows, no changes as of 12/26/2024:  Abilify 5 mg daily as mood adjunct   Zoloft 150 mg daily for depression and anxiety  Continue to encourage participation in group therapy, milieu therapy and occupational therapy.  Continue to assess for side effects of medications.  Continue collaboration with [facility] for medical co-morbidities as indicated.  Continue discussion with CM/SW to assist with obtaining collateral, disposition planning, and the implementation of patient-centered individualized plan of care.  Continue frequent safety checks and vitals per unit protocol.    Risks, benefits and possible side effects of Medications: Risks, benefits, and possible side effects of medications have previously been explained. No new medications at this time.      Legal status: 201    Disposition: to be determined, pending SOAR application for potential group home placement. OT Cognitive Evaluation completed: \"Pt would benefit from discharge to a supportive environment that can provide checks for safety and compliance with IADLs. \"   Although patient's mood has stabilized, they are currently awaiting group home placement.  Their ability to recognize safety hazards take medications and participate in IADLs are significantly impaired by their cognitive deficits compounded by their chronic mental health needs and would be at risk for significant decompensation without the structure and support of a group home setting.      No associated orders " from this encounter found during lookback period of 72 hours.  Autism spectrum disorder  Continue supportive care    No associated orders from this encounter found during lookback period of 72 hours.        Plan     Recommended Treatment:    - Encourage early mobility and having a structured day  - Provide frequent re-orientation, and cognitive stimulation  - Ensure assistive devices are in proper working order (eye-glasses, hearing aids)  - Encourage adequate hydration, nutrition and monitor bowel movements  - Maintain sleep-wake cycle: Uninterrupted sleep time; low-level lighting at night  - Fall precaution  - f/u [facility] recs regarding the medical problems   - Continue medication titration and treatment plan; adjust medication to optimize treatment response and as clinically indicated. .   - Observation: routine            - VS: as per unit protocol  - Diet: Regular diet  - Encourage group attendance and milieu therapy  - Dispo: To be determined     Scheduled medications:  Current Facility-Administered Medications   Medication Dose Route Frequency Provider Last Rate    acetaminophen  650 mg Oral Q6H PRN Basilio Brown MD      acetaminophen  650 mg Oral Q4H PRN Basilio Brown MD      acetaminophen  975 mg Oral Q6H PRN Basilio Brown MD      aluminum-magnesium hydroxide-simethicone  30 mL Oral Q4H PRN Basilio Brown MD      ARIPiprazole  5 mg Oral Daily Basilio Panda MD      Artificial Tears  1 drop Both Eyes Q3H PRN Basilio Brown MD      atorvastatin  10 mg Oral Daily With Dinner WALLY Baptiste      benztropine  1 mg Intramuscular Q4H PRN Max 6/day Basilio Brown MD      benztropine  1 mg Oral Q4H PRN Max 6/day Basilio Brown MD      Cholecalciferol  2,500 Units Oral Daily Dustin Mcallister MD      cyanocobalamin  1,000 mcg Oral Daily WALLY Baptiste      Diclofenac Sodium  2 g Topical 4x Daily PRN WALLY Baptiste      hydrOXYzine HCL   50 mg Oral Q6H PRN Max 4/day Basilio Brown MD      Or    diphenhydrAMINE  50 mg Intramuscular Q6H PRN Basilio Brown MD      diphenhydrAMINE-zinc acetate   Topical Daily PRN Raj Guerrero MD      famotidine  20 mg Oral BID Laura Clark Makeda, WALLY      hydrOXYzine HCL  100 mg Oral Q6H PRN Max 4/day Basilio Brown MD      Or    LORazepam  2 mg Intramuscular Q6H PRN Basilio Brown MD      hydrOXYzine HCL  25 mg Oral Q6H PRN Max 4/day Basilio Brown MD      levothyroxine  37.5 mcg Oral Early Morning Laura RomeWALLY Jacobson      melatonin  6 mg Oral HS PRN WALLY Gomez      methocarbamol  500 mg Oral Q6H PRN WALLY Baptiste      OLANZapine  5 mg Oral Q4H PRN Max 3/day Basilio Brown MD      Or    OLANZapine  2.5 mg Intramuscular Q4H PRN Max 3/day Basilio Brown MD      OLANZapine  5 mg Oral Q3H PRN Max 3/day Basilio Brown MD      Or    OLANZapine  5 mg Intramuscular Q3H PRN Max 3/day Basilio Brown MD      OLANZapine  2.5 mg Oral Q4H PRN Max 6/day Basilio Brown MD      polyethylene glycol  17 g Oral Daily PRN Basilio Brown MD      propranolol  10 mg Oral Q8H PRN Basilio Brown MD      senna-docusate sodium  2 tablet Oral After Dinner Dustin Mcallister MD      sertraline  150 mg Oral Daily WALLY Gomez      traZODone  50 mg Oral HS PRN Basilio Brown MD        PRN:    acetaminophen    acetaminophen    acetaminophen    aluminum-magnesium hydroxide-simethicone    Artificial Tears    benztropine    benztropine    Diclofenac Sodium    hydrOXYzine HCL **OR** diphenhydrAMINE    diphenhydrAMINE-zinc acetate    hydrOXYzine HCL **OR** LORazepam    hydrOXYzine HCL    melatonin    methocarbamol    OLANZapine **OR** OLANZapine    OLANZapine **OR** OLANZapine    OLANZapine    polyethylene glycol    propranolol    traZODone       Subjective     Patient was visited on unit for continuing care; chart reviewed and  "discussed with multidisciplinary treatment team.  On approach, the patient was calm and cooperative. Denied any changes in mood, appetite, and energy level. No problem initiating and maintaining sleep.  Denied A/VH currently.  Denied SI/HI, intent or plan upon direct inquiry at this time.    Patient continues to be visible in the milieu and interacts with staff and peers. No reports of aggression or self-injurious behavior on unit. No PRN medications used in the past 24 hours.    Patient accepted all offered medications and no adverse effects of medications noted or reported.    Objective    Current Mental Status Evaluation:  Appearance: casually dressed, consistent with stated age  Motor: no psychomotor retardation, no gait abnormalities  Behavior: cooperative, answers questions appropriately  Speech: soft, normal rhythm  Mood: \"all good\"  Affect: blunted  Thought Process: linear and goal-oriented  Thought Content: denies delusions  Risk Potential: denies suicidal ideation, plan, or intent. Denies homicidal ideation  Perceptions: denies auditory hallucinations, denies visual hallucinations,   Sensorium: Oriented to person, place, time, and situation  Cognition: cognitive ability appears intact but was not quantitatively tested  Consciousness: alert and awake  Attention: intact, able to focus without difficulty  Insight: limited  Judgement: limited            Vital signs in last 24 hours:    Temp:  [96.6 °F (35.9 °C)-97.5 °F (36.4 °C)] 96.6 °F (35.9 °C)  HR:  [67-81] 67  BP: (105-122)/(55-75) 105/55  Resp:  [16] 16  SpO2:  [96 %-97 %] 97 %  O2 Device: None (Room air)    Psychiatric Review of Systems:  Medication adverse effects: none  Sleep: unchanged  Appetite: unchanged  Behaviors over the past 24 hours: unchanged    Laboratory results:    I have personally reviewed all pertinent laboratory/tests results  No results found for this or any previous visit (from the past 48 hours).       Progress Toward Goals & " Illness Status:   mood is stabilizing, discharge planning, placement pending    Patient is not at goal. They are not yet ready for discharge. The patient's condition currently requires active psychopharmacological medication management, interdisciplinary coordination with case management, and the utilization of adjunctive milieu and group therapy to augment psychopharmacological efficacy. The patient's risk of morbidity, and progression or decompensation of psychiatric disease, is higher without this current treatment.     Next of Kin  Extended Emergency Contact Information  Primary Emergency Contact: Lois Roberson  Address: [address] [place] [address] [place]           [address]  Home Phone: [phone]  Relation: Mother    Counseling / Coordination of Care  Patient's progress discussed with staff in treatment team meeting.  Medications, treatment progress and treatment plan reviewed with patient.  Medication education provided to patient.  Educated on importance of medication and treatment compliance.  Supportive therapy provided to patient.  Cognitive techniques utilized during the session.  Reassurance and supportive therapy provided.  Reoriented to reality and reassured.  Encouraged participation in milieu and group therapy on the unit.  Crisis/safety plan discussed with patient.       Dustin Mcallister MD  Attending Psychiatrist   [facility]      This note has been constructed using a voice recognition system. There may be translation, syntax, or grammatical errors. If you have any questions, please contact the dictating provider.

## 2024-12-26 NOTE — NURSING NOTE
Patient noted on the unit , speaking with select peers during activities.Patient denies SI/HI/AH/VH at this time. Compliant with medications and routine vitals.

## 2024-12-26 NOTE — PROGRESS NOTES
"Pt engaged in 1:1 session with this writer following group wrap up. Pt and this writer touched base about the holiday yesterday, and how they opted to cope. Pt explained that he felt pressured to come out of his room to join his peers, though he opted to sleep most of the day. Pt and this writer also spoke about his long term and short term goal that he briefly touched on his goal of, \"getting on my feet, and being there to support my friend.\" When asked further about his friend, Pt explained that he has had a friend for over 20 years, and he hopes to help become a caretaker for him as he has a disability that makes mobilizing difficult. Pt is pleasant t/o and expressed his enjoyment of the group topics.   "

## 2024-12-26 NOTE — PROGRESS NOTES
12/26/24 0840   Team Meeting   Meeting Type Daily Rounds   Team Members Present   Team Members Present Physician;Nurse;   Physician Team Member Ary   Nursing Team Member [facility] Management Team Member Suhas   Patient/Family Present   Patient Present No   Patient's Family Present No     Pt pleasant, calm, cooperative. Visible on the unit. Med/meal compliant. Discharge pending placement.

## 2024-12-26 NOTE — NURSING NOTE
Visible intermittently. Quiet and to himself. SI/HI, denies hallucinations. Compliant with medications. Cooperative. Waiting placement.

## 2024-12-26 NOTE — PLAN OF CARE
Problem: Depression  Goal: Treatment Goal: Demonstrate behavioral control of depressive symptoms, verbalize feelings of improved mood/affect, and adopt new coping skills prior to discharge  Outcome: Progressing     Problem: Depression  Goal: Treatment Goal: Demonstrate behavioral control of depressive symptoms, verbalize feelings of improved mood/affect, and adopt new coping skills prior to discharge  Outcome: Progressing  Goal: Verbalize thoughts and feelings  Description: Interventions:  - Assess and re-assess patient's level of risk   - Engage patient in 1:1 interactions, daily, for a minimum of 15 minutes   - Encourage patient to express feelings, fears, frustrations, hopes   Outcome: Progressing  Goal: Refrain from harming self  Description: Interventions:  - Monitor patient closely, per order   - Supervise medication ingestion, monitor effects and side effects   Outcome: Progressing  Goal: Refrain from isolation  Description: Interventions:  - Develop a trusting relationship   - Encourage socialization   Outcome: Progressing     Problem: Ineffective Coping  Goal: Participates in unit activities  Description: Interventions:  - Provide therapeutic environment   - Provide required programming   - Redirect inappropriate behaviors   Outcome: Progressing     Problem: Anxiety  Goal: Anxiety is at manageable level  Description: Interventions:  - Assess and monitor patient's anxiety level.   - Monitor for signs and symptoms (heart palpitations, chest pain, shortness of breath, headaches, nausea, feeling jumpy, restlessness, irritable, apprehensive).   - Collaborate with interdisciplinary team and initiate plan and interventions as ordered.  - [place] patient to unit/surroundings  - Explain treatment plan  - Encourage participation in care  - Encourage verbalization of concerns/fears  - Identify coping mechanisms  - Assist in developing anxiety-reducing skills  - Administer/offer alternative therapies  - Limit or  eliminate stimulants  Outcome: ProgressiNG     Problem: Knowledge Deficit  Goal: Patient/family/caregiver demonstrates understanding of disease process, treatment plan, medications, and discharge instructions  Description: Complete learning assessment and assess knowledge base.  Interventions:  - Provide teaching at level of understanding  - Provide teaching via preferred learning methods  Outcome: Progressing

## 2024-12-27 PROCEDURE — 99232 SBSQ HOSP IP/OBS MODERATE 35: CPT | Performed by: PSYCHIATRY & NEUROLOGY

## 2024-12-27 RX ADMIN — SENNOSIDES AND DOCUSATE SODIUM 2 TABLET: 50; 8.6 TABLET ORAL at 17:16

## 2024-12-27 RX ADMIN — CYANOCOBALAMIN TAB 1000 MCG 1000 MCG: 1000 TAB at 08:32

## 2024-12-27 RX ADMIN — ARIPIPRAZOLE 5 MG: 5 TABLET ORAL at 08:32

## 2024-12-27 RX ADMIN — ATORVASTATIN CALCIUM 10 MG: 10 TABLET, FILM COATED ORAL at 17:16

## 2024-12-27 RX ADMIN — SERTRALINE HYDROCHLORIDE 150 MG: 100 TABLET ORAL at 08:32

## 2024-12-27 RX ADMIN — FAMOTIDINE 20 MG: 20 TABLET ORAL at 17:16

## 2024-12-27 RX ADMIN — Medication 2500 UNITS: at 08:31

## 2024-12-27 RX ADMIN — FAMOTIDINE 20 MG: 20 TABLET ORAL at 08:32

## 2024-12-27 RX ADMIN — LEVOTHYROXINE SODIUM 37.5 MCG: 125 TABLET ORAL at 06:54

## 2024-12-27 NOTE — PLAN OF CARE
Problem: Ineffective Coping  Goal: Participates in unit activities  Description: Interventions:  - Provide therapeutic environment   - Provide required programming   - Redirect inappropriate behaviors   Outcome: Progressing     Problem: Depression  Goal: Treatment Goal: Demonstrate behavioral control of depressive symptoms, verbalize feelings of improved mood/affect, and adopt new coping skills prior to discharge  Outcome: Progressing  Goal: Verbalize thoughts and feelings  Description: Interventions:  - Assess and re-assess patient's level of risk   - Engage patient in 1:1 interactions, daily, for a minimum of 15 minutes   - Encourage patient to express feelings, fears, frustrations, hopes   Outcome: Progressing  Goal: Refrain from harming self  Description: Interventions:  - Monitor patient closely, per order   - Supervise medication ingestion, monitor effects and side effects   Outcome: Progressing  Goal: Refrain from isolation  Description: Interventions:  - Develop a trusting relationship   - Encourage socialization   Outcome: Progressing     Problem: Anxiety  Goal: Anxiety is at manageable level  Description: Interventions:  - Assess and monitor patient's anxiety level.   - Monitor for signs and symptoms (heart palpitations, chest pain, shortness of breath, headaches, nausea, feeling jumpy, restlessness, irritable, apprehensive).   - Collaborate with interdisciplinary team and initiate plan and interventions as ordered.  - [place] patient to unit/surroundings  - Explain treatment plan  - Encourage participation in care  - Encourage verbalization of concerns/fears  - Identify coping mechanisms  - Assist in developing anxiety-reducing skills  - Administer/offer alternative therapies  - Limit or eliminate stimulants  Outcome: Progressing     Problem: DISCHARGE PLANNING - CARE MANAGEMENT  Goal: Discharge to post-acute care or home with appropriate resources  Description: INTERVENTIONS:  - Conduct assessment to  determine patient/family and health care team treatment goals, and need for post-acute services based on payer coverage, community resources, and patient preferences, and barriers to discharge  - Address psychosocial, clinical, and financial barriers to discharge as identified in assessment in conjunction with the patient/family and health care team  - Arrange appropriate level of post-acute services according to patient’s   needs and preference and payer coverage in collaboration with the physician and health care team  - Communicate with and update the patient/family, physician, and health care team regarding progress on the discharge plan  - Arrange appropriate transportation to post-acute venues  Outcome: Progressing     Problem: Knowledge Deficit  Goal: Patient/family/caregiver demonstrates understanding of disease process, treatment plan, medications, and discharge instructions  Description: Complete learning assessment and assess knowledge base.  Interventions:  - Provide teaching at level of understanding  - Provide teaching via preferred learning methods  Outcome: Progressing     Problem: SLEEP DISTURBANCE  Goal: Will exhibit normal sleeping pattern  Description: Interventions:  -  Assess the patients sleep pattern, noting recent changes  - Administer medication as ordered  - Decrease environmental stimuli, including noise, as appropriate during the night  - Encourage the patient to actively participate in unit groups and or exercise during the day to enhance ability to achieve adequate sleep at night  - Assess the patient, in the morning, encouraging a description of sleep experience  Outcome: Progressing

## 2024-12-27 NOTE — SOCIAL WORK
Awaiting update from [facility] regarding pt's ability to receive SSI. No mail received in mailroom for pt.

## 2024-12-27 NOTE — PROGRESS NOTES
12/27/24 0844   Team Meeting   Meeting Type Daily Rounds   Team Members Present   Team Members Present Physician;Nurse;   Physician Team Member Ary   Nursing Team Member [facility] Team Member Suhas   Patient/Family Present   Patient Present No   Patient's Family Present No     Pt med/meal compliant. Visible on the unit. Pleasant, calm, cooperative. Discharge pending placement.

## 2024-12-27 NOTE — NURSING NOTE
Pt pleasant and cooperative. More visible in the evening, walking the halls with peers. Denies SI/HI/AH/VH. Medication and meal compliant. No current unmet needs.

## 2024-12-27 NOTE — NURSING NOTE
Patient denies SI, HI, AHs and VHs. Denies anxiety and depression. Patient is medication and meal compliant. Visible on the unit, walking the halls. Went back to bed after breakfast. He denies any needs at this time.

## 2024-12-27 NOTE — PROGRESS NOTES
"Progress Note - Behavioral Health   Name: Franco Roberson 39 y.o. male I MRN: [id number]  Unit/Bed#: -01 I Date of Admission: 5/14/2024   Date of Service: 12/27/2024 I Hospital Day: 227     Assessment & Plan  MDD (major depressive disorder), recurrent severe, without psychosis (HCC)  Patient continues to be pending group home placement. Otherwise no clinical significant change.  Pt and SW able to have meeting w/ [facility] on 12/13. Waiting for follow up from [facility] by mail.    Medication regimen as follows, no changes as of 12/27/2024:  Abilify 5 mg daily as mood adjunct   Zoloft 150 mg daily for depression and anxiety  Continue to encourage participation in group therapy, milieu therapy and occupational therapy.  Continue to assess for side effects of medications.  Continue collaboration with [facility] for medical co-morbidities as indicated.  Continue discussion with CM/SW to assist with obtaining collateral, disposition planning, and the implementation of patient-centered individualized plan of care.  Continue frequent safety checks and vitals per unit protocol.    Risks, benefits and possible side effects of Medications: Risks, benefits, and possible side effects of medications have previously been explained. No new medications at this time.      Legal status: 201    Disposition: to be determined, pending SOAR application for potential group home placement. OT Cognitive Evaluation completed: \"Pt would benefit from discharge to a supportive environment that can provide checks for safety and compliance with IADLs. \"   Although patient's mood has stabilized, they are currently awaiting group home placement.  Their ability to recognize safety hazards take medications and participate in IADLs are significantly impaired by their cognitive deficits compounded by their chronic mental health needs and would be at risk for significant decompensation without the structure and support of a group home setting.      No associated orders " from this encounter found during lookback period of 72 hours.  Autism spectrum disorder  Continue supportive care    No associated orders from this encounter found during lookback period of 72 hours.        Plan     Recommended Treatment:    - Encourage early mobility and having a structured day  - Provide frequent re-orientation, and cognitive stimulation  - Ensure assistive devices are in proper working order (eye-glasses, hearing aids)  - Encourage adequate hydration, nutrition and monitor bowel movements  - Maintain sleep-wake cycle: Uninterrupted sleep time; low-level lighting at night  - Fall precaution  - f/u [facility] recs regarding the medical problems   - Continue medication titration and treatment plan; adjust medication to optimize treatment response and as clinically indicated. .   - Observation: routine            - VS: as per unit protocol  - Diet: Regular diet  - Encourage group attendance and milieu therapy  - Dispo: To be determined     Scheduled medications:  Current Facility-Administered Medications   Medication Dose Route Frequency Provider Last Rate    acetaminophen  650 mg Oral Q6H PRN Basilio Brown MD      acetaminophen  650 mg Oral Q4H PRN Basilio Brown MD      acetaminophen  975 mg Oral Q6H PRN Basilio Brown MD      aluminum-magnesium hydroxide-simethicone  30 mL Oral Q4H PRN Basilio Brown MD      ARIPiprazole  5 mg Oral Daily Basilio Panda MD      Artificial Tears  1 drop Both Eyes Q3H PRN Basilio Brown MD      atorvastatin  10 mg Oral Daily With Dinner WALLY Baptiste      benztropine  1 mg Intramuscular Q4H PRN Max 6/day Basilio Brwon MD      benztropine  1 mg Oral Q4H PRN Max 6/day Basilio Brown MD      Cholecalciferol  2,500 Units Oral Daily Dustin Mcallister MD      cyanocobalamin  1,000 mcg Oral Daily WALLY Baptiste      Diclofenac Sodium  2 g Topical 4x Daily PRN WALLY Baptiste      hydrOXYzine HCL   50 mg Oral Q6H PRN Max 4/day Basilio Brown MD      Or    diphenhydrAMINE  50 mg Intramuscular Q6H PRN Basilio Brown MD      diphenhydrAMINE-zinc acetate   Topical Daily PRN Raj Guerrero MD      famotidine  20 mg Oral BID Laura Clark Makeda, WALLY      hydrOXYzine HCL  100 mg Oral Q6H PRN Max 4/day Basilio Brown MD      Or    LORazepam  2 mg Intramuscular Q6H PRN Basilio Brown MD      hydrOXYzine HCL  25 mg Oral Q6H PRN Max 4/day Basilio Brown MD      levothyroxine  37.5 mcg Oral Early Morning Lauar RomeWALLY Jacobson      melatonin  6 mg Oral HS PRN WALLY Gomez      methocarbamol  500 mg Oral Q6H PRN WALLY Baptiste      OLANZapine  5 mg Oral Q4H PRN Max 3/day Basilio Brown MD      Or    OLANZapine  2.5 mg Intramuscular Q4H PRN Max 3/day Basilio Brown MD      OLANZapine  5 mg Oral Q3H PRN Max 3/day Basilio Brown MD      Or    OLANZapine  5 mg Intramuscular Q3H PRN Max 3/day Basilio Brown MD      OLANZapine  2.5 mg Oral Q4H PRN Max 6/day Basilio Brown MD      polyethylene glycol  17 g Oral Daily PRN Basilio Brown MD      propranolol  10 mg Oral Q8H PRN Basilio Brown MD      senna-docusate sodium  2 tablet Oral After Dinner Dustin Mcallister MD      sertraline  150 mg Oral Daily WALLY Gomez      traZODone  50 mg Oral HS PRN Basilio Brown MD        PRN:    acetaminophen    acetaminophen    acetaminophen    aluminum-magnesium hydroxide-simethicone    Artificial Tears    benztropine    benztropine    Diclofenac Sodium    hydrOXYzine HCL **OR** diphenhydrAMINE    diphenhydrAMINE-zinc acetate    hydrOXYzine HCL **OR** LORazepam    hydrOXYzine HCL    melatonin    methocarbamol    OLANZapine **OR** OLANZapine    OLANZapine **OR** OLANZapine    OLANZapine    polyethylene glycol    propranolol    traZODone       Subjective     Patient was visited on unit for continuing care; chart reviewed and  "discussed with multidisciplinary treatment team.  On approach, the patient was calm and cooperative. Denied any changes in mood, appetite, and energy level. No problem initiating and maintaining sleep.  Denied A/VH currently.  Denied SI/HI, intent or plan upon direct inquiry at this time.  Was able to have one-on-one session with therapist yesterday which she discussed his goals of getting back on his feet and potentially helping out a friend.    Patient continues to be visible in the milieu and interacts with staff and peers. No reports of aggression or self-injurious behavior on unit. No PRN medications used in the past 24 hours.    Patient accepted all offered medications and no adverse effects of medications noted or reported.    Objective    Current Mental Status Evaluation:  Appearance: casually dressed, consistent with stated age  Motor: no psychomotor retardation, no gait abnormalities  Behavior: cooperative, answers questions appropriately  Speech: soft, normal rhythm  Mood: \"good\"  Affect: constricted  Thought Process: linear and goal-oriented  Thought Content: denies delusions  Risk Potential: denies suicidal ideation, plan, or intent. Denies homicidal ideation  Perceptions: denies auditory hallucinations, denies visual hallucinations,   Sensorium: Oriented to person, place, time, and situation  Cognition: cognitive ability appears intact but was not quantitatively tested  Consciousness: alert and awake  Attention: intact, able to focus without difficulty  Insight: limited  Judgement: limited            Vital signs in last 24 hours:    Temp:  [96.5 °F (35.8 °C)-97 °F (36.1 °C)] 96.5 °F (35.8 °C)  HR:  [64-69] 69  BP: (126-130)/(65-68) 126/65  Resp:  [18] 18  SpO2:  [96 %-97 %] 97 %  O2 Device: None (Room air)    Psychiatric Review of Systems:  Medication adverse effects: none  Sleep: unchanged  Appetite: unchanged  Behaviors over the past 24 hours: unchanged    Laboratory results:    I have personally " reviewed all pertinent laboratory/tests results  No results found for this or any previous visit (from the past 48 hours).       Progress Toward Goals & Illness Status:   progressing, mood is stabilizing, discharge planning, placement pending    Patient is not at goal. They are not yet ready for discharge. The patient's condition currently requires active psychopharmacological medication management, interdisciplinary coordination with case management, and the utilization of adjunctive milieu and group therapy to augment psychopharmacological efficacy. The patient's risk of morbidity, and progression or decompensation of psychiatric disease, is higher without this current treatment.     Next of Kin  Extended Emergency Contact Information  Primary Emergency Contact: Lois Roberson  Address: [address]           [place] [id number] [place] [address] [place]  Home Phone: [phone]  Relation: Mother    Counseling / Coordination of Care  Patient's progress discussed with staff in treatment team meeting.  Medications, treatment progress and treatment plan reviewed with patient.  Medication education provided to patient.  Educated on importance of medication and treatment compliance.  Supportive therapy provided to patient.  Cognitive techniques utilized during the session.  Reassurance and supportive therapy provided.  Reoriented to reality and reassured.  Encouraged participation in milieu and group therapy on the unit.  Crisis/safety plan discussed with patient.       Dustin Mcallister MD  Attending Psychiatrist   [facility]      This note has been constructed using a voice recognition system. There may be translation, syntax, or grammatical errors. If you have any questions, please contact the dictating provider.

## 2024-12-27 NOTE — ASSESSMENT & PLAN NOTE
"Patient continues to be pending group home placement. Otherwise no clinical significant change.  Pt and SW able to have meeting w/ [facility] on 12/13. Waiting for follow up from [facility] by mail.    Medication regimen as follows, no changes as of 12/27/2024:  Abilify 5 mg daily as mood adjunct   Zoloft 150 mg daily for depression and anxiety  Continue to encourage participation in group therapy, milieu therapy and occupational therapy.  Continue to assess for side effects of medications.  Continue collaboration with [facility] for medical co-morbidities as indicated.  Continue discussion with CM/SW to assist with obtaining collateral, disposition planning, and the implementation of patient-centered individualized plan of care.  Continue frequent safety checks and vitals per unit protocol.    Risks, benefits and possible side effects of Medications: Risks, benefits, and possible side effects of medications have previously been explained. No new medications at this time.      Legal status: 201    Disposition: to be determined, pending SOAR application for potential group home placement. OT Cognitive Evaluation completed: \"Pt would benefit from discharge to a supportive environment that can provide checks for safety and compliance with IADLs. \"   Although patient's mood has stabilized, they are currently awaiting group home placement.  Their ability to recognize safety hazards take medications and participate in IADLs are significantly impaired by their cognitive deficits compounded by their chronic mental health needs and would be at risk for significant decompensation without the structure and support of a group home setting.      No associated orders from this encounter found during lookback period of 72 hours.  "

## 2024-12-28 PROCEDURE — 99232 SBSQ HOSP IP/OBS MODERATE 35: CPT | Performed by: PSYCHIATRY & NEUROLOGY

## 2024-12-28 RX ADMIN — FAMOTIDINE 20 MG: 20 TABLET ORAL at 09:00

## 2024-12-28 RX ADMIN — CYANOCOBALAMIN TAB 1000 MCG 1000 MCG: 1000 TAB at 09:00

## 2024-12-28 RX ADMIN — Medication 2500 UNITS: at 09:00

## 2024-12-28 RX ADMIN — FAMOTIDINE 20 MG: 20 TABLET ORAL at 17:40

## 2024-12-28 RX ADMIN — SENNOSIDES AND DOCUSATE SODIUM 2 TABLET: 50; 8.6 TABLET ORAL at 17:40

## 2024-12-28 RX ADMIN — ATORVASTATIN CALCIUM 10 MG: 10 TABLET, FILM COATED ORAL at 17:41

## 2024-12-28 RX ADMIN — LEVOTHYROXINE SODIUM 37.5 MCG: 125 TABLET ORAL at 06:11

## 2024-12-28 RX ADMIN — ARIPIPRAZOLE 5 MG: 5 TABLET ORAL at 09:00

## 2024-12-28 RX ADMIN — SERTRALINE HYDROCHLORIDE 150 MG: 100 TABLET ORAL at 09:00

## 2024-12-28 NOTE — NURSING NOTE
Pt has been visible on unit this evening writing and walking in halls. Pleasant on approach and denies all symptoms. Continues to appears depressed/disheveled with poor ADLs/hygiene. Denies needs and concerns and states he continues to cope with the bad news that keeps coming r/t results of last meeting he states he discussed today with staff. Pt compliant with unit routines and care. Safety checks ongoing.

## 2024-12-28 NOTE — NURSING NOTE
Patient denies SI, HI, AHs and VHs. Denies anxiety and depression. Patient is medication and meal compliant. Withdrawn to his room where he has been eating his meals. Encouraged ADLs and group participation. He denies any needs at this time.

## 2024-12-28 NOTE — PLAN OF CARE
Problem: Ineffective Coping  Goal: Participates in unit activities  Description: Interventions:  - Provide therapeutic environment   - Provide required programming   - Redirect inappropriate behaviors   Outcome: Progressing     Problem: Depression  Goal: Treatment Goal: Demonstrate behavioral control of depressive symptoms, verbalize feelings of improved mood/affect, and adopt new coping skills prior to discharge  Outcome: Progressing  Goal: Verbalize thoughts and feelings  Description: Interventions:  - Assess and re-assess patient's level of risk   - Engage patient in 1:1 interactions, daily, for a minimum of 15 minutes   - Encourage patient to express feelings, fears, frustrations, hopes   Outcome: Progressing  Goal: Refrain from harming self  Description: Interventions:  - Monitor patient closely, per order   - Supervise medication ingestion, monitor effects and side effects   Outcome: Progressing  Goal: Refrain from isolation  Description: Interventions:  - Develop a trusting relationship   - Encourage socialization   Outcome: Progressing     Problem: Anxiety  Goal: Anxiety is at manageable level  Description: Interventions:  - Assess and monitor patient's anxiety level.   - Monitor for signs and symptoms (heart palpitations, chest pain, shortness of breath, headaches, nausea, feeling jumpy, restlessness, irritable, apprehensive).   - Collaborate with interdisciplinary team and initiate plan and interventions as ordered.  - [place] patient to unit/surroundings  - Explain treatment plan  - Encourage participation in care  - Encourage verbalization of concerns/fears  - Identify coping mechanisms  - Assist in developing anxiety-reducing skills  - Administer/offer alternative therapies  - Limit or eliminate stimulants  Outcome: Progressing     Problem: DISCHARGE PLANNING - CARE MANAGEMENT  Goal: Discharge to post-acute care or home with appropriate resources  Description: INTERVENTIONS:  - Conduct assessment to  determine patient/family and health care team treatment goals, and need for post-acute services based on payer coverage, community resources, and patient preferences, and barriers to discharge  - Address psychosocial, clinical, and financial barriers to discharge as identified in assessment in conjunction with the patient/family and health care team  - Arrange appropriate level of post-acute services according to patient’s   needs and preference and payer coverage in collaboration with the physician and health care team  - Communicate with and update the patient/family, physician, and health care team regarding progress on the discharge plan  - Arrange appropriate transportation to post-acute venues  Outcome: Progressing     Problem: Knowledge Deficit  Goal: Patient/family/caregiver demonstrates understanding of disease process, treatment plan, medications, and discharge instructions  Description: Complete learning assessment and assess knowledge base.  Interventions:  - Provide teaching at level of understanding  - Provide teaching via preferred learning methods  Outcome: Progressing     Problem: SLEEP DISTURBANCE  Goal: Will exhibit normal sleeping pattern  Description: Interventions:  -  Assess the patients sleep pattern, noting recent changes  - Administer medication as ordered  - Decrease environmental stimuli, including noise, as appropriate during the night  - Encourage the patient to actively participate in unit groups and or exercise during the day to enhance ability to achieve adequate sleep at night  - Assess the patient, in the morning, encouraging a description of sleep experience  Outcome: Progressing

## 2024-12-28 NOTE — PROGRESS NOTES
"Progress Note - Behavioral Health   Name: Franco Roberson 39 y.o. male I MRN: [id number]  Unit/Bed#: -01 I Date of Admission: 5/14/2024   Date of Service: 12/28/2024 I Hospital Day: 228    Assessment & Plan  MDD (major depressive disorder), recurrent severe, without psychosis (HCC)  Patient continues to be pending group home placement. Otherwise no clinical significant change.  Pt and SW able to have meeting w/ [facility] on 12/13. Waiting for follow up from [facility] by mail.    Medication regimen as follows, no changes as of 12/27/2024:  Abilify 5 mg daily as mood adjunct   Zoloft 150 mg daily for depression and anxiety  Continue to encourage participation in group therapy, milieu therapy and occupational therapy.  Continue to assess for side effects of medications.  Continue collaboration with SLIM for medical co-morbidities as indicated.  Continue discussion with CM/SW to assist with obtaining collateral, disposition planning, and the implementation of patient-centered individualized plan of care.  Continue frequent safety checks and vitals per unit protocol.    Risks, benefits and possible side effects of Medications: Risks, benefits, and possible side effects of medications have previously been explained. No new medications at this time.      Legal status: 201    Disposition: to be determined, pending SOAR application for potential group home placement. OT Cognitive Evaluation completed: \"Pt would benefit from discharge to a supportive environment that can provide checks for safety and compliance with IADLs. \"   Although patient's mood has stabilized, they are currently awaiting group home placement.  Their ability to recognize safety hazards take medications and participate in IADLs are significantly impaired by their cognitive deficits compounded by their chronic mental health needs and would be at risk for significant decompensation without the structure and support of a group home setting.      No associated orders " from this encounter found during lookback period of 72 hours.  Autism spectrum disorder  Continue supportive care    No associated orders from this encounter found during lookback period of 72 hours.        ------------------------------------------------------------    Current Medications:  Current Facility-Administered Medications   Medication Dose Route Frequency Provider Last Rate    acetaminophen  650 mg Oral Q6H PRN Basilio Brown MD      acetaminophen  650 mg Oral Q4H PRN Basilio Brown MD      acetaminophen  975 mg Oral Q6H PRN Basilio Brown MD      aluminum-magnesium hydroxide-simethicone  30 mL Oral Q4H PRN Basilio Brown MD      ARIPiprazole  5 mg Oral Daily Basilio Panda MD      Artificial Tears  1 drop Both Eyes Q3H PRN Basilio Brown MD      atorvastatin  10 mg Oral Daily With Dinner WALLY Baptiste      benztropine  1 mg Intramuscular Q4H PRN Max 6/day Basilio Brown MD      benztropine  1 mg Oral Q4H PRN Max 6/day Basilio Brown MD      Cholecalciferol  2,500 Units Oral Daily Dustin Mcallister MD      cyanocobalamin  1,000 mcg Oral Daily WALLY Baptiste      Diclofenac Sodium  2 g Topical 4x Daily PRN WALLY Baptiste      hydrOXYzine HCL  50 mg Oral Q6H PRN Max 4/day Basilio Brown MD      Or    diphenhydrAMINE  50 mg Intramuscular Q6H PRN Basilio Brown MD      diphenhydrAMINE-zinc acetate   Topical Daily PRN Raj Guerrero MD      famotidine  20 mg Oral BID WALLY Baptiste      hydrOXYzine HCL  100 mg Oral Q6H PRN Max 4/day Basilio Brown MD      Or    LORazepam  2 mg Intramuscular Q6H PRN Basilio Brown MD      hydrOXYzine HCL  25 mg Oral Q6H PRN Max 4/day Basilio Brown MD      levothyroxine  37.5 mcg Oral Early Morning WALLY Baptiste      melatonin  6 mg Oral HS PRN WALLY Gomez      methocarbamol  500 mg Oral Q6H PRN WALLY Baptiste       "OLANZapine  5 mg Oral Q4H PRN Max 3/day Basilio Brown MD      Or    OLANZapine  2.5 mg Intramuscular Q4H PRN Max 3/day Basilio Brown MD      OLANZapine  5 mg Oral Q3H PRN Max 3/day Basilio Brown MD      Or    OLANZapine  5 mg Intramuscular Q3H PRN Max 3/day Basilio Brown MD      OLANZapine  2.5 mg Oral Q4H PRN Max 6/day Basilio Brown MD      polyethylene glycol  17 g Oral Daily PRN Basilio Brown MD      propranolol  10 mg Oral Q8H PRN Basilio Brown MD      senna-docusate sodium  2 tablet Oral After Dinner Dustin Mcallister MD      sertraline  150 mg Oral Daily WALLY Gomez      traZODone  50 mg Oral HS PRN Basilio Brown MD         Recommended Treatment Plan:   Behavioral checks every 15 minutes  Encourage participation in group therapy, milieu therapy and occupational therapy.  Assess for side effects of medications.  Collaboration with PATRICIA for medical co-morbidities as indicated.  Continue discussion with CM/SW to assist with obtaining collateral, disposition planning, and the implementation of patient-centered individualized plan of care.  Estimated Discharge Date: 1/31/2025  Legal status: 201      Risks / Benefits of Treatment:    Risks, benefits, and possible side effects of medications explained to patient and patient verbalizes understanding and agreement for treatment.    Subjective: Patient's chart was reviewed. Patient's progress and plan was discussed with treatment team. Per nursing report, Franco has been withdrawn to his room, not attending ADLs on the unit. He remains compliant with medications.     Prn medications over the past 24 hours: None    Franco was evaluated this morning for continuity of care. On examination, Franco is calm, cooperative, concrete in thought process. He states his mood is \" good.\" He reports sleeping well. His appetite has been good. He denies adverse effects from medications. He denies active or passive suicidal " "ideation and homicidal ideation. He denies auditory or visual hallucinations.  He states that his anxiety and depression have been slowly improving.  He states that once he is getting closer to disposition he thinks he will be a little more anxious.  No other complaints or concerns noted by patient    Psychiatric Review of Systems:  Behavior over the last 24 hours: unchanged  Sleep: normal  Appetite: adequate  Medication side effects: none verbalized  Medical ROS: Complete review of systems is negative except as noted above.    Vital signs in last 24 hours:  Temp:  [96.7 °F (35.9 °C)-97.1 °F (36.2 °C)] 96.7 °F (35.9 °C)  HR:  [62-83] 62  BP: (103-119)/(61-69) 119/69  Resp:  [16] 16  SpO2:  [97 %-98 %] 98 %  O2 Device: None (Room air)    VS: Reviewed, within normal limits    Mental Status Exam:    Appearance:  marginal grooming and hygiene, disheveled , casually dressed, and overweight   Behavior:  calm, cooperative, and laying in bed   Speech:  slow, soft, and scant   Mood:  \"good\"   Affect:  constricted   Thought Process:  Goal-directed and linear, concrete   Thought Content:  no verbalized delusions or overt paranoia   Perceptual Disturbances: no reported hallucinations and does not appear to be responding to internal stimuli at this time   Risk Potential: Suicidal ideation -  Denies at present   Homicidal ideation - Denies at present  Potential for aggression - Not at present   Sensorium:  oriented to person, place, and time/date   Memory:  recent and remote memory grossly intact   Consciousness:  alert and awake   Attention/Concentration: attention span and concentration are age appropriate   Insight:  limited   Judgment: limited   Gait/Station: in bed   Motor Activity: no abnormal movements       Behavioral Health Medications: all current active meds have been reviewed. Changes as in plan section above.    Laboratory results:  I have personally reviewed all pertinent laboratory/tests results.   No results found " for this or any previous visit (from the past 48 hours).     Progress Toward Goals: progressing gradually    Counseling / Coordination of Care:  Patient's progress discussed with staff in treatment team meeting.      Crys Plasencia DO 12/28/24  Psychiatry Residency, PGY-2  This note has been constructed using a voice recognition system. There may be translation, syntax, or grammatical errors. If you have any questions, please contact the dictating author.

## 2024-12-29 PROCEDURE — 99232 SBSQ HOSP IP/OBS MODERATE 35: CPT | Performed by: PSYCHIATRY & NEUROLOGY

## 2024-12-29 RX ADMIN — SERTRALINE HYDROCHLORIDE 150 MG: 100 TABLET ORAL at 08:35

## 2024-12-29 RX ADMIN — FAMOTIDINE 20 MG: 20 TABLET ORAL at 17:37

## 2024-12-29 RX ADMIN — ARIPIPRAZOLE 5 MG: 5 TABLET ORAL at 08:35

## 2024-12-29 RX ADMIN — CYANOCOBALAMIN TAB 1000 MCG 1000 MCG: 1000 TAB at 08:35

## 2024-12-29 RX ADMIN — LEVOTHYROXINE SODIUM 37.5 MCG: 125 TABLET ORAL at 06:08

## 2024-12-29 RX ADMIN — ATORVASTATIN CALCIUM 10 MG: 10 TABLET, FILM COATED ORAL at 17:37

## 2024-12-29 RX ADMIN — Medication 2500 UNITS: at 08:35

## 2024-12-29 RX ADMIN — FAMOTIDINE 20 MG: 20 TABLET ORAL at 08:35

## 2024-12-29 RX ADMIN — SENNOSIDES AND DOCUSATE SODIUM 2 TABLET: 50; 8.6 TABLET ORAL at 17:37

## 2024-12-29 NOTE — PLAN OF CARE
Problem: Ineffective Coping  Goal: Participates in unit activities  Description: Interventions:  - Provide therapeutic environment   - Provide required programming   - Redirect inappropriate behaviors   Outcome: Progressing     Problem: Depression  Goal: Treatment Goal: Demonstrate behavioral control of depressive symptoms, verbalize feelings of improved mood/affect, and adopt new coping skills prior to discharge  Outcome: Progressing  Goal: Verbalize thoughts and feelings  Description: Interventions:  - Assess and re-assess patient's level of risk   - Engage patient in 1:1 interactions, daily, for a minimum of 15 minutes   - Encourage patient to express feelings, fears, frustrations, hopes   Outcome: Progressing  Goal: Refrain from harming self  Description: Interventions:  - Monitor patient closely, per order   - Supervise medication ingestion, monitor effects and side effects   Outcome: Progressing  Goal: Refrain from isolation  Description: Interventions:  - Develop a trusting relationship   - Encourage socialization   Outcome: Progressing     Problem: Anxiety  Goal: Anxiety is at manageable level  Description: Interventions:  - Assess and monitor patient's anxiety level.   - Monitor for signs and symptoms (heart palpitations, chest pain, shortness of breath, headaches, nausea, feeling jumpy, restlessness, irritable, apprehensive).   - Collaborate with interdisciplinary team and initiate plan and interventions as ordered.  - [place] patient to unit/surroundings  - Explain treatment plan  - Encourage participation in care  - Encourage verbalization of concerns/fears  - Identify coping mechanisms  - Assist in developing anxiety-reducing skills  - Administer/offer alternative therapies  - Limit or eliminate stimulants  Outcome: Progressing     Problem: DISCHARGE PLANNING - CARE MANAGEMENT  Goal: Discharge to post-acute care or home with appropriate resources  Description: INTERVENTIONS:  - Conduct assessment to  determine patient/family and health care team treatment goals, and need for post-acute services based on payer coverage, community resources, and patient preferences, and barriers to discharge  - Address psychosocial, clinical, and financial barriers to discharge as identified in assessment in conjunction with the patient/family and health care team  - Arrange appropriate level of post-acute services according to patient’s   needs and preference and payer coverage in collaboration with the physician and health care team  - Communicate with and update the patient/family, physician, and health care team regarding progress on the discharge plan  - Arrange appropriate transportation to post-acute venues  Outcome: Progressing     Problem: Knowledge Deficit  Goal: Patient/family/caregiver demonstrates understanding of disease process, treatment plan, medications, and discharge instructions  Description: Complete learning assessment and assess knowledge base.  Interventions:  - Provide teaching at level of understanding  - Provide teaching via preferred learning methods  Outcome: Progressing     Problem: SLEEP DISTURBANCE  Goal: Will exhibit normal sleeping pattern  Description: Interventions:  -  Assess the patients sleep pattern, noting recent changes  - Administer medication as ordered  - Decrease environmental stimuli, including noise, as appropriate during the night  - Encourage the patient to actively participate in unit groups and or exercise during the day to enhance ability to achieve adequate sleep at night  - Assess the patient, in the morning, encouraging a description of sleep experience  Outcome: Progressing

## 2024-12-29 NOTE — PROGRESS NOTES
"Progress Note - Behavioral Health   Name: Franco Roberson 39 y.o. male I MRN: [id number]  Unit/Bed#: -01 I Date of Admission: 5/14/2024   Date of Service: 12/29/2024 I Hospital Day: 229    Assessment & Plan  MDD (major depressive disorder), recurrent severe, without psychosis (HCC)  Patient continues to be pending group home placement. Otherwise no clinical significant change.  Pt and SW able to have meeting w/ [facility] on 12/13. Waiting for follow up from [facility] by mail.    Medication regimen as follows, no changes as of 12/27/2024:  Abilify 5 mg daily as mood adjunct   Zoloft 150 mg daily for depression and anxiety  Continue to encourage participation in group therapy, milieu therapy and occupational therapy.  Continue to assess for side effects of medications.  Continue collaboration with SLIM for medical co-morbidities as indicated.  Continue discussion with CM/SW to assist with obtaining collateral, disposition planning, and the implementation of patient-centered individualized plan of care.  Continue frequent safety checks and vitals per unit protocol.    Risks, benefits and possible side effects of Medications: Risks, benefits, and possible side effects of medications have previously been explained. No new medications at this time.      Legal status: 201    Disposition: to be determined, pending SOAR application for potential group home placement. OT Cognitive Evaluation completed: \"Pt would benefit from discharge to a supportive environment that can provide checks for safety and compliance with IADLs. \"   Although patient's mood has stabilized, they are currently awaiting group home placement.  Their ability to recognize safety hazards take medications and participate in IADLs are significantly impaired by their cognitive deficits compounded by their chronic mental health needs and would be at risk for significant decompensation without the structure and support of a group home setting.      No associated orders " from this encounter found during lookback period of 72 hours.  Autism spectrum disorder  Continue supportive care    No associated orders from this encounter found during lookback period of 72 hours.        ------------------------------------------------------------    Current Medications:  Current Facility-Administered Medications   Medication Dose Route Frequency Provider Last Rate    acetaminophen  650 mg Oral Q6H PRN Basilio Brown MD      acetaminophen  650 mg Oral Q4H PRN Basilio Brown MD      acetaminophen  975 mg Oral Q6H PRN Basilio Brown MD      aluminum-magnesium hydroxide-simethicone  30 mL Oral Q4H PRN Basilio Brown MD      ARIPiprazole  5 mg Oral Daily Basilio Panda MD      Artificial Tears  1 drop Both Eyes Q3H PRN Basilio Brown MD      atorvastatin  10 mg Oral Daily With Dinner WALLY Baptiste      benztropine  1 mg Intramuscular Q4H PRN Max 6/day Basilio Brown MD      benztropine  1 mg Oral Q4H PRN Max 6/day Basilio Brown MD      Cholecalciferol  2,500 Units Oral Daily Dustin Mcallister MD      cyanocobalamin  1,000 mcg Oral Daily WALLY Baptiste      Diclofenac Sodium  2 g Topical 4x Daily PRN WALLY Baptiste      hydrOXYzine HCL  50 mg Oral Q6H PRN Max 4/day Basilio Brown MD      Or    diphenhydrAMINE  50 mg Intramuscular Q6H PRN Basilio Brown MD      diphenhydrAMINE-zinc acetate   Topical Daily PRN Raj Guerrero MD      famotidine  20 mg Oral BID WALLY Baptiste      hydrOXYzine HCL  100 mg Oral Q6H PRN Max 4/day Basilio Brown MD      Or    LORazepam  2 mg Intramuscular Q6H PRN Basilio Brown MD      hydrOXYzine HCL  25 mg Oral Q6H PRN Max 4/day Basilio Brown MD      levothyroxine  37.5 mcg Oral Early Morning WALLY Baptiste      melatonin  6 mg Oral HS PRN WALLY Gomez      methocarbamol  500 mg Oral Q6H PRN WALLY Baptiste       "OLANZapine  5 mg Oral Q4H PRN Max 3/day Basilio Brown MD      Or    OLANZapine  2.5 mg Intramuscular Q4H PRN Max 3/day Basilio Brown MD      OLANZapine  5 mg Oral Q3H PRN Max 3/day Basilio Brown MD      Or    OLANZapine  5 mg Intramuscular Q3H PRN Max 3/day Basilio Brown MD      OLANZapine  2.5 mg Oral Q4H PRN Max 6/day Basilio Brown MD      polyethylene glycol  17 g Oral Daily PRN Basilio Brown MD      propranolol  10 mg Oral Q8H PRN Basilio Brown MD      senna-docusate sodium  2 tablet Oral After Dinner Dustin Mcallister MD      sertraline  150 mg Oral Daily WALLY Gomez      traZODone  50 mg Oral HS PRN Basilio Brown MD         Recommended Treatment Plan:   Behavioral checks every 15 minutes  Encourage participation in group therapy, milieu therapy and occupational therapy.  Assess for side effects of medications.  Collaboration with PATRICIA for medical co-morbidities as indicated.  Continue discussion with CM/SW to assist with obtaining collateral, disposition planning, and the implementation of patient-centered individualized plan of care.  Estimated Discharge Date: 1/31/2025  Legal status: 201      Risks / Benefits of Treatment:    Risks, benefits, and possible side effects of medications explained to patient. Patient has limited understanding of risks and benefits of treatment at this time, but agrees to take medications as prescribed.    Subjective: Patient's chart was reviewed. Patient's progress and plan was discussed with treatment team. Per nursing report, Franco has been withdrawn to his room, but visible at times in the evening, denying psych symptoms, in behavioral control on the unit. He remains compliant with medications.     Prn medications over the past 24 hours: none    Franco was evaluated this morning for continuity of care. On examination, Franco is pleasant, cooperative, concrete in thought process. He states his mood is \" good.\" He " "reports sleeping well. His appetite has been adequate. He denies adverse effects from medications. He denies active or passive suicidal ideation and homicidal ideation. He denies auditory or visual hallucinations.  He states that he took a shower and cut his nails today.  No other concerns or complaints.    Psychiatric Review of Systems:  Behavior over the last 24 hours: unchanged  Sleep: normal  Appetite: adequate  Medication side effects: none verbalized  Medical ROS: Complete review of systems is negative except as noted above.    Vital signs in last 24 hours:  Temp:  [97 °F (36.1 °C)-97.8 °F (36.6 °C)] 97.8 °F (36.6 °C)  HR:  [68-76] 68  BP: (106-130)/(56-74) 130/74  Resp:  [16] 16  SpO2:  [98 %-99 %] 98 %  O2 Device: None (Room air)    VS: Reviewed, within normal limits    Mental Status Exam:    Appearance:  appropriate grooming and hygiene, casually dressed, and overweight   Behavior:  calm, cooperative, and sitting comfortably   Speech:  slow, soft, and scant   Mood:  \"Good\"   Affect:  constricted   Thought Process:  organized, goal directed, concrete   Thought Content:  no verbalized delusions or overt paranoia   Perceptual Disturbances: no reported hallucinations and does not appear to be responding to internal stimuli at this time   Risk Potential: Suicidal ideation -  Denies at present   Homicidal ideation - Denies at present  Potential for aggression - Not at present   Sensorium:  oriented to person, place, and time/date   Memory:  recent and remote memory grossly intact   Consciousness:  alert and awake   Attention/Concentration: attention span and concentration are age appropriate   Insight:  limited   Judgment: limited   Gait/Station: normal gait/station, normal balance   Motor Activity: no abnormal movements       Behavioral Health Medications: all current active meds have been reviewed. Changes as in plan section above.    Laboratory results:  I have personally reviewed all pertinent " laboratory/tests results.   No results found for this or any previous visit (from the past 48 hours).     Progress Toward Goals: progressing slowly    Counseling / Coordination of Care:  Patient's progress discussed with staff in treatment team meeting.      Crys Plasencia DO 12/29/24  Psychiatry Residency, PGY-2  This note has been constructed using a voice recognition system. There may be translation, syntax, or grammatical errors. If you have any questions, please contact the dictating author.

## 2024-12-29 NOTE — NURSING NOTE
Patient has been visible at times this evening, walking in the [place] earlier with a peer. He withdrew to his room after snack. He denies S.I.H.I.A/H V/H

## 2024-12-29 NOTE — NURSING NOTE
Patient denies SI, HI, AHs and VHs. Denies anxiety and depression. Patient is medication and meal compliant. Showered and cut nails. Visible on the unit, walking the hallways. He denies any needs at this time.

## 2024-12-30 PROCEDURE — 99232 SBSQ HOSP IP/OBS MODERATE 35: CPT | Performed by: PSYCHIATRY & NEUROLOGY

## 2024-12-30 RX ADMIN — LEVOTHYROXINE SODIUM 37.5 MCG: 125 TABLET ORAL at 05:57

## 2024-12-30 RX ADMIN — ATORVASTATIN CALCIUM 10 MG: 10 TABLET, FILM COATED ORAL at 17:13

## 2024-12-30 RX ADMIN — SERTRALINE HYDROCHLORIDE 150 MG: 100 TABLET ORAL at 08:15

## 2024-12-30 RX ADMIN — Medication 2500 UNITS: at 08:15

## 2024-12-30 RX ADMIN — ARIPIPRAZOLE 5 MG: 5 TABLET ORAL at 08:15

## 2024-12-30 RX ADMIN — FAMOTIDINE 20 MG: 20 TABLET ORAL at 08:15

## 2024-12-30 RX ADMIN — SENNOSIDES AND DOCUSATE SODIUM 2 TABLET: 50; 8.6 TABLET ORAL at 17:13

## 2024-12-30 RX ADMIN — CYANOCOBALAMIN TAB 1000 MCG 1000 MCG: 1000 TAB at 08:15

## 2024-12-30 RX ADMIN — FAMOTIDINE 20 MG: 20 TABLET ORAL at 17:13

## 2024-12-30 NOTE — NURSING NOTE
Patient has been visible intermittently. Quiet and to himself. Attending some groups. Denies symptoms, compliant with medications. Waiting placement.

## 2024-12-30 NOTE — PLAN OF CARE
Problem: Ineffective Coping  Goal: Participates in unit activities  Description: Interventions:  - Provide therapeutic environment   - Provide required programming   - Redirect inappropriate behaviors   Outcome: Progressing     Problem: Depression  Goal: Treatment Goal: Demonstrate behavioral control of depressive symptoms, verbalize feelings of improved mood/affect, and adopt new coping skills prior to discharge  Outcome: Progressing     Problem: Anxiety  Goal: Anxiety is at manageable level  Description: Interventions:  - Assess and monitor patient's anxiety level.   - Monitor for signs and symptoms (heart palpitations, chest pain, shortness of breath, headaches, nausea, feeling jumpy, restlessness, irritable, apprehensive).   - Collaborate with interdisciplinary team and initiate plan and interventions as ordered.  - [place] patient to unit/surroundings  - Explain treatment plan  - Encourage participation in care  - Encourage verbalization of concerns/fears  - Identify coping mechanisms  - Assist in developing anxiety-reducing skills  - Administer/offer alternative therapies  - Limit or eliminate stimulants  Outcome: Progressing     Problem: DISCHARGE PLANNING - CARE MANAGEMENT  Goal: Discharge to post-acute care or home with appropriate resources  Description: INTERVENTIONS:  - Conduct assessment to determine patient/family and health care team treatment goals, and need for post-acute services based on payer coverage, community resources, and patient preferences, and barriers to discharge  - Address psychosocial, clinical, and financial barriers to discharge as identified in assessment in conjunction with the patient/family and health care team  - Arrange appropriate level of post-acute services according to patient’s   needs and preference and payer coverage in collaboration with the physician and health care team  - Communicate with and update the patient/family, physician, and health care team regarding  progress on the discharge plan  - Arrange appropriate transportation to post-acute venues  Outcome: Progressing     Problem: Knowledge Deficit  Goal: Patient/family/caregiver demonstrates understanding of disease process, treatment plan, medications, and discharge instructions  Description: Complete learning assessment and assess knowledge base.  Interventions:  - Provide teaching at level of understanding  - Provide teaching via preferred learning methods  Outcome: Progressing

## 2024-12-30 NOTE — PROGRESS NOTES
"Progress Note - Behavioral Health   Name: Franco Roberson 39 y.o. male I MRN: [id number]  Unit/Bed#: -01 I Date of Admission: 5/14/2024   Date of Service: 12/30/2024 I Hospital Day: 230     Assessment & Plan  MDD (major depressive disorder), recurrent severe, without psychosis (HCC)  Patient continues to be pending group home placement. Otherwise no clinical significant change.  Pt and SW able to have meeting w/ [facility] on 12/13. Waiting for follow up from [facility] by mail.    Medication regimen as follows, no changes as of 12/29/2024:  Abilify 5 mg daily as mood adjunct   Zoloft 150 mg daily for depression and anxiety  Continue to encourage participation in group therapy, milieu therapy and occupational therapy.  Continue to assess for side effects of medications.  Continue collaboration with [facility] for medical co-morbidities as indicated.  Continue discussion with CM/SW to assist with obtaining collateral, disposition planning, and the implementation of patient-centered individualized plan of care.  Continue frequent safety checks and vitals per unit protocol.    Risks, benefits and possible side effects of Medications: Risks, benefits, and possible side effects of medications have previously been explained. No new medications at this time.      Legal status: 201    Disposition: to be determined, pending SOAR application for potential group home placement. OT Cognitive Evaluation completed: \"Pt would benefit from discharge to a supportive environment that can provide checks for safety and compliance with IADLs. \"   Although patient's mood has stabilized, they are currently awaiting group home placement.  Their ability to recognize safety hazards take medications and participate in IADLs are significantly impaired by their cognitive deficits compounded by their chronic mental health needs and would be at risk for significant decompensation without the structure and support of a group home setting.      No associated orders " from this encounter found during lookback period of 72 hours.  Autism spectrum disorder  Continue supportive care    No associated orders from this encounter found during lookback period of 72 hours.        Plan     Recommended Treatment:    - Encourage early mobility and having a structured day  - Provide frequent re-orientation, and cognitive stimulation  - Ensure assistive devices are in proper working order (eye-glasses, hearing aids)  - Encourage adequate hydration, nutrition and monitor bowel movements  - Maintain sleep-wake cycle: Uninterrupted sleep time; low-level lighting at night  - Fall precaution  - f/u [facility] recs regarding the medical problems   - Continue medication titration and treatment plan; adjust medication to optimize treatment response and as clinically indicated. .   - Observation: routine            - VS: as per unit protocol  - Diet: Regular diet  - Encourage group attendance and milieu therapy  - Dispo: To be determined     Scheduled medications:  Current Facility-Administered Medications   Medication Dose Route Frequency Provider Last Rate    acetaminophen  650 mg Oral Q6H PRN Basilio Brown MD      acetaminophen  650 mg Oral Q4H PRN Basilio Brown MD      acetaminophen  975 mg Oral Q6H PRN Basilio Brown MD      aluminum-magnesium hydroxide-simethicone  30 mL Oral Q4H PRN Basilio Brown MD      ARIPiprazole  5 mg Oral Daily Basilio Panda MD      Artificial Tears  1 drop Both Eyes Q3H PRN Basilio Brown MD      atorvastatin  10 mg Oral Daily With Dinner WALLY Baptiste      benztropine  1 mg Intramuscular Q4H PRN Max 6/day Basilio Brown MD      benztropine  1 mg Oral Q4H PRN Max 6/day Basilio Brown MD      Cholecalciferol  2,500 Units Oral Daily Dustin Mcallister MD      cyanocobalamin  1,000 mcg Oral Daily WALLY Baptiste      Diclofenac Sodium  2 g Topical 4x Daily PRN WALLY Baptiste      hydrOXYzine HCL   50 mg Oral Q6H PRN Max 4/day Basilio Brown MD      Or    diphenhydrAMINE  50 mg Intramuscular Q6H PRN Basilio Brown MD      diphenhydrAMINE-zinc acetate   Topical Daily PRN Raj Guerrero MD      famotidine  20 mg Oral BID Laura Clark Makeda, WALLY      hydrOXYzine HCL  100 mg Oral Q6H PRN Max 4/day Basilio Brown MD      Or    LORazepam  2 mg Intramuscular Q6H PRN Basilio Brown MD      hydrOXYzine HCL  25 mg Oral Q6H PRN Max 4/day Basilio Brown MD      levothyroxine  37.5 mcg Oral Early Morning Laura RomeWALLY Jacobson      melatonin  6 mg Oral HS PRN WALLY Gomez      methocarbamol  500 mg Oral Q6H PRN WALLY Baptiste      OLANZapine  5 mg Oral Q4H PRN Max 3/day Basilio Brown MD      Or    OLANZapine  2.5 mg Intramuscular Q4H PRN Max 3/day Basilio Brown MD      OLANZapine  5 mg Oral Q3H PRN Max 3/day Basilio Brown MD      Or    OLANZapine  5 mg Intramuscular Q3H PRN Max 3/day Basilio Brown MD      OLANZapine  2.5 mg Oral Q4H PRN Max 6/day Basilio Brown MD      polyethylene glycol  17 g Oral Daily PRN Basilio Brown MD      propranolol  10 mg Oral Q8H PRN Basilio Brown MD      senna-docusate sodium  2 tablet Oral After Dinner Dustin Mcallister MD      sertraline  150 mg Oral Daily WALLY Gomez      traZODone  50 mg Oral HS PRN Basilio Brown MD        PRN:    acetaminophen    acetaminophen    acetaminophen    aluminum-magnesium hydroxide-simethicone    Artificial Tears    benztropine    benztropine    Diclofenac Sodium    hydrOXYzine HCL **OR** diphenhydrAMINE    diphenhydrAMINE-zinc acetate    hydrOXYzine HCL **OR** LORazepam    hydrOXYzine HCL    melatonin    methocarbamol    OLANZapine **OR** OLANZapine    OLANZapine **OR** OLANZapine    OLANZapine    polyethylene glycol    propranolol    traZODone       Subjective     Patient was visited on unit for continuing care; chart reviewed and  "discussed with multidisciplinary treatment team.  On approach, the patient was calm and cooperative. Denied any changes in mood, appetite, and energy level. No problem initiating and maintaining sleep.  Denied A/VH currently.  Denied SI/HI, intent or plan upon direct inquiry at this time.    Patient continues to be visible in the milieu and interacts with staff and peers. No reports of aggression or self-injurious behavior on unit. No PRN medications used in the past 24 hours.    Patient accepted all offered medications and no adverse effects of medications noted or reported.    Objective    Current Mental Status Evaluation:  Mental Status Exam  Appearance: casually dressed, consistent with stated age  Motor: no psychomotor retardation, no gait abnormalities  Behavior: cooperative, answers questions appropriately  Speech: soft, normal rhythm  Mood: \"good\"  Affect: constricted, depressed-appearing  Thought Process: linear and goal-oriented  Thought Content: denies delusions or paranoia  Risk Potential: denies suicidal ideation, plan, or intent. Denies homicidal ideation  Perceptions: denies auditory hallucinations, denies visual hallucinations,   Sensorium: Oriented to person, place, time, and situation  Cognition: cognitive ability appears intact but was not quantitatively tested  Consciousness: alert and awake  Attention: intact, able to focus without difficulty  Insight: limited  Judgement: limited            Vital signs in last 24 hours:    Temp:  [97 °F (36.1 °C)-97.3 °F (36.3 °C)] 97 °F (36.1 °C)  HR:  [65-78] 65  BP: (105-112)/(64-72) 112/64  Resp:  [16] 16  SpO2:  [98 %-99 %] 99 %  O2 Device: None (Room air)    Psychiatric Review of Systems:  Medication adverse effects: none  Sleep: unchanged  Appetite: unchanged  Behaviors over the past 24 hours: unchanged    Laboratory results:    I have personally reviewed all pertinent laboratory/tests results  No results found for this or any previous visit (from the " past 48 hours).       Progress Toward Goals & Illness Status:   attends groups, participates in milieu therapy, mood is stabilizing, discharge planning, placement pending    Patient is not at goal. They are not yet ready for discharge. The patient's condition currently requires active psychopharmacological medication management, interdisciplinary coordination with case management, and the utilization of adjunctive milieu and group therapy to augment psychopharmacological efficacy. The patient's risk of morbidity, and progression or decompensation of psychiatric disease, is higher without this current treatment.     Next of Kin  Extended Emergency Contact Information  Primary Emergency Contact: Lois Roberson  Address: [address]           [address] [place], [place] [id number] [place] [address] [place]  Home Phone: [phone]  Relation: Mother    Counseling / Coordination of Care  Patient's progress discussed with staff in treatment team meeting.  Medications, treatment progress and treatment plan reviewed with patient.  Medication education provided to patient.  Educated on importance of medication and treatment compliance.  Supportive therapy provided to patient.  Cognitive techniques utilized during the session.  Reassurance and supportive therapy provided.  Reoriented to reality and reassured.  Encouraged participation in milieu and group therapy on the unit.  Crisis/safety plan discussed with patient.       Dustin Mcallister MD  Attending Psychiatrist   [facility]      This note has been constructed using a voice recognition system. There may be translation, syntax, or grammatical errors. If you have any questions, please contact the dictating provider.

## 2024-12-30 NOTE — NURSING NOTE
C/O productive clear phlegm cough with midsternal chest discomfort. Increasing pain with coughing. Stable gait, A&Ox3.    Patient is calm and cooperative upon approach. Patient is visible on unit, isolative to self. Patient denies SI/HI/AH/VH. Patient is compliant with meds and meals.

## 2024-12-30 NOTE — ASSESSMENT & PLAN NOTE
"Patient continues to be pending group home placement. Otherwise no clinical significant change.  Pt and SW able to have meeting w/ [facility] on [place]13. Waiting for follow up from [facility] [place] mail.    Medication regimen as follows, no changes as of 12/29/2024:  Abilify 5 mg daily as mood adjunct   Zoloft 150 mg daily for depression and anxiety  Continue to encourage participation in group therapy, milieu therapy and occupational therapy.  Continue to assess for side effects of medications.  Continue collaboration with [facility] for medical co-morbidities as indicated.  Continue discussion with CM/SW to assist with obtaining collateral, disposition planning, and the implementation of patient-centered individualized plan of care.  Continue frequent safety checks and vitals per unit protocol.    Risks, benefits and possible side effects of Medications: Risks, benefits, and possible side effects of medications have previously been explained. No new medications at this time.      Legal status: 201    Disposition: to be determined, pending SOAR application for potential group home placement. OT Cognitive Evaluation completed: \"Pt would benefit from discharge to a supportive environment that can provide checks for safety and compliance with IADLs. \"   Although patient's mood has stabilized, they are currently awaiting group home placement.  Their ability to recognize safety hazards take medications and participate in IADLs are significantly impaired by their cognitive deficits compounded by their chronic mental health needs and would be at risk for significant decompensation without the structure and support of a group home setting.      No associated orders from this encounter found during lookback period of 72 hours.  "

## 2024-12-31 PROCEDURE — 99232 SBSQ HOSP IP/OBS MODERATE 35: CPT | Performed by: PSYCHIATRY & NEUROLOGY

## 2024-12-31 RX ADMIN — ATORVASTATIN CALCIUM 10 MG: 10 TABLET, FILM COATED ORAL at 17:48

## 2024-12-31 RX ADMIN — SENNOSIDES AND DOCUSATE SODIUM 2 TABLET: 50; 8.6 TABLET ORAL at 17:48

## 2024-12-31 RX ADMIN — CYANOCOBALAMIN TAB 1000 MCG 1000 MCG: 1000 TAB at 08:19

## 2024-12-31 RX ADMIN — SERTRALINE HYDROCHLORIDE 150 MG: 100 TABLET ORAL at 08:19

## 2024-12-31 RX ADMIN — ARIPIPRAZOLE 5 MG: 5 TABLET ORAL at 08:19

## 2024-12-31 RX ADMIN — Medication 2500 UNITS: at 08:19

## 2024-12-31 RX ADMIN — LEVOTHYROXINE SODIUM 37.5 MCG: 125 TABLET ORAL at 06:55

## 2024-12-31 RX ADMIN — FAMOTIDINE 20 MG: 20 TABLET ORAL at 17:48

## 2024-12-31 RX ADMIN — FAMOTIDINE 20 MG: 20 TABLET ORAL at 08:19

## 2024-12-31 NOTE — PROGRESS NOTES
"Progress Note - Behavioral Health   Name: Franco Roberson 39 y.o. male I MRN: [id number]  Unit/Bed#: -01 I Date of Admission: 5/14/2024   Date of Service: 12/31/2024 I Hospital Day: 231     Assessment & Plan  MDD (major depressive disorder), recurrent severe, without psychosis (HCC)  Patient continues to be pending group home placement. Otherwise no clinical significant change.  Pt and SW able to have meeting w/ [facility] on 12/13. Waiting for follow up from [facility] by mail.    Medication regimen as follows, no changes as of 12/31/2024:  Abilify 5 mg daily as mood adjunct   Zoloft 150 mg daily for depression and anxiety  Continue to encourage participation in group therapy, milieu therapy and occupational therapy.  Continue to assess for side effects of medications.  Continue collaboration with [facility] for medical co-morbidities as indicated.  Continue discussion with CM/SW to assist with obtaining collateral, disposition planning, and the implementation of patient-centered individualized plan of care.  Continue frequent safety checks and vitals per unit protocol.    Risks, benefits and possible side effects of Medications: Risks, benefits, and possible side effects of medications have previously been explained. No new medications at this time.      Legal status: 201    Disposition: to be determined, pending SOAR application for potential group home placement. OT Cognitive Evaluation completed: \"Pt would benefit from discharge to a supportive environment that can provide checks for safety and compliance with IADLs. \"   Although patient's mood has stabilized, they are currently awaiting group home placement.  Their ability to recognize safety hazards take medications and participate in IADLs are significantly impaired by their cognitive deficits compounded by their chronic mental health needs and would be at risk for significant decompensation without the structure and support of a group home setting.      No associated orders " from this encounter found during lookback period of 72 hours.  Autism spectrum disorder  Continue supportive care    No associated orders from this encounter found during lookback period of 72 hours.        Plan     Recommended Treatment:    - Encourage early mobility and having a structured day  - Provide frequent re-orientation, and cognitive stimulation  - Ensure assistive devices are in proper working order (eye-glasses, hearing aids)  - Encourage adequate hydration, nutrition and monitor bowel movements  - Maintain sleep-wake cycle: Uninterrupted sleep time; low-level lighting at night  - Fall precaution  - f/u [facility] recs regarding the medical problems   - Continue medication titration and treatment plan; adjust medication to optimize treatment response and as clinically indicated. .   - Observation: routine            - VS: as per unit protocol  - Diet: Regular diet  - Encourage group attendance and milieu therapy  - Dispo: To be determined     Scheduled medications:  Current Facility-Administered Medications   Medication Dose Route Frequency Provider Last Rate    acetaminophen  650 mg Oral Q6H PRN Basilio Brown MD      acetaminophen  650 mg Oral Q4H PRN Basilio Brown MD      acetaminophen  975 mg Oral Q6H PRN Basilio Brown MD      aluminum-magnesium hydroxide-simethicone  30 mL Oral Q4H PRN Basilio Brown MD      ARIPiprazole  5 mg Oral Daily Basilio Panda MD      Artificial Tears  1 drop Both Eyes Q3H PRN Basilio Brown MD      atorvastatin  10 mg Oral Daily With Dinner WALLY Baptiste      benztropine  1 mg Intramuscular Q4H PRN Max 6/day Basilio Brown MD      benztropine  1 mg Oral Q4H PRN Max 6/day Basilio Brown MD      Cholecalciferol  2,500 Units Oral Daily Dustin Mcallister MD      cyanocobalamin  1,000 mcg Oral Daily WALLY Baptiste      Diclofenac Sodium  2 g Topical 4x Daily PRN WALLY Baptiste      hydrOXYzine HCL   50 mg Oral Q6H PRN Max 4/day Basilio Brown MD      Or    diphenhydrAMINE  50 mg Intramuscular Q6H PRN Basilio Brown MD      diphenhydrAMINE-zinc acetate   Topical Daily PRN Raj Guerrero MD      famotidine  20 mg Oral BID Laura Clark Makeda, WALLY      hydrOXYzine HCL  100 mg Oral Q6H PRN Max 4/day Basilio Brown MD      Or    LORazepam  2 mg Intramuscular Q6H PRN Basilio Brown MD      hydrOXYzine HCL  25 mg Oral Q6H PRN Max 4/day Basilio Brown MD      levothyroxine  37.5 mcg Oral Early Morning Laura RomeWALLY Jacobson      melatonin  6 mg Oral HS PRN WALLY Gomez      methocarbamol  500 mg Oral Q6H PRN WALLY Baptiste      OLANZapine  5 mg Oral Q4H PRN Max 3/day Basilio Brown MD      Or    OLANZapine  2.5 mg Intramuscular Q4H PRN Max 3/day Basilio Brown MD      OLANZapine  5 mg Oral Q3H PRN Max 3/day Basilio Brown MD      Or    OLANZapine  5 mg Intramuscular Q3H PRN Max 3/day Basilio Brown MD      OLANZapine  2.5 mg Oral Q4H PRN Max 6/day Basilio Brown MD      polyethylene glycol  17 g Oral Daily PRN Basilio Brown MD      propranolol  10 mg Oral Q8H PRN Basilio Brown MD      senna-docusate sodium  2 tablet Oral After Dinner Dustin Mcallister MD      sertraline  150 mg Oral Daily WALLY Gomez      traZODone  50 mg Oral HS PRN Basilio Brown MD        PRN:    acetaminophen    acetaminophen    acetaminophen    aluminum-magnesium hydroxide-simethicone    Artificial Tears    benztropine    benztropine    Diclofenac Sodium    hydrOXYzine HCL **OR** diphenhydrAMINE    diphenhydrAMINE-zinc acetate    hydrOXYzine HCL **OR** LORazepam    hydrOXYzine HCL    melatonin    methocarbamol    OLANZapine **OR** OLANZapine    OLANZapine **OR** OLANZapine    OLANZapine    polyethylene glycol    propranolol    traZODone       Subjective     Patient was visited on unit for continuing care; chart reviewed and  "discussed with multidisciplinary treatment team.  On approach, the patient was calm and cooperative. Denied any changes in mood, appetite, and energy level.   met with patient yesterday to continue to work on Social Security application. No problem initiating and maintaining sleep.  Denied A/VH currently.  Denied SI/HI, intent or plan upon direct inquiry at this time.    Patient continues to be visible in the milieu and interacts with staff and peers. No reports of aggression or self-injurious behavior on unit. No PRN medications used in the past 24 hours.    Patient accepted all offered medications and no adverse effects of medications noted or reported.    Objective    Current Mental Status Evaluation:  Mental Status Exam  Appearance: casually dressed, consistent with stated age  Motor: no psychomotor retardation  Behavior: cooperative, answers questions appropriately  Speech: soft, normal rhythm  Mood: \"alright\"  Affect: constricted  Thought Process: linear and goal-oriented  Thought Content: denies delusions  Risk Potential: denies suicidal ideation, plan, or intent. Denies homicidal ideation  Perceptions: denies auditory hallucinations, denies visual hallucinations,   Sensorium: Oriented to person, place, time, and situation  Cognition: cognitive ability appears intact but was not quantitatively tested  Consciousness: alert and awake  Attention: intact, able to focus without difficulty  Insight: limited  Judgement: limited          Vital signs in last 24 hours:    Temp:  [97.5 °F (36.4 °C)-98 °F (36.7 °C)] 97.5 °F (36.4 °C)  HR:  [69-75] 69  BP: (109-121)/(68-75) 121/75  Resp:  [17-18] 18  SpO2:  [96 %-97 %] 97 %  O2 Device: None (Room air)    Psychiatric Review of Systems:  Medication adverse effects: none  Sleep: unchanged  Appetite: unchanged  Behaviors over the past 24 hours: unchanged    Laboratory results:    I have personally reviewed all pertinent laboratory/tests results  No results found " for this or any previous visit (from the past 48 hours).       Progress Toward Goals & Illness Status:   progressing, attends groups, participates in milieu therapy, discharge planning, placement pending    Patient is not at goal. They are not yet ready for discharge. The patient's condition currently requires active psychopharmacological medication management, interdisciplinary coordination with case management, and the utilization of adjunctive milieu and group therapy to augment psychopharmacological efficacy. The patient's risk of morbidity, and progression or decompensation of psychiatric disease, is higher without this current treatment.     Next of Kin  Extended Emergency Contact Information  Primary Emergency Contact: Lois Roberson  Address: [address]           [address] [place], [place] [id number] [place] [address] [place]  Home Phone: [phone]  Relation: Mother    Counseling / Coordination of Care  Patient's progress discussed with staff in treatment team meeting.  Medications, treatment progress and treatment plan reviewed with patient.  Medication education provided to patient.  Educated on importance of medication and treatment compliance.  Supportive therapy provided to patient.  Cognitive techniques utilized during the session.  Reassurance and supportive therapy provided.  Reoriented to reality and reassured.  Encouraged participation in milieu and group therapy on the unit.  Crisis/safety plan discussed with patient.       Dustin Mcallister MD  Attending Psychiatrist   [facility]      This note has been constructed using a voice recognition system. There may be translation, syntax, or grammatical errors. If you have any questions, please contact the dictating provider.

## 2024-12-31 NOTE — ASSESSMENT & PLAN NOTE
"Patient continues to be pending group home placement. Otherwise no clinical significant change.  Pt and SW able to have meeting w/ [facility] on 12/13. Waiting for follow up from [facility] by mail.    Medication regimen as follows, no changes as of 12/31/2024:  Abilify 5 mg daily as mood adjunct   Zoloft 150 mg daily for depression and anxiety  Continue to encourage participation in group therapy, milieu therapy and occupational therapy.  Continue to assess for side effects of medications.  Continue collaboration with [facility] for medical co-morbidities as indicated.  Continue discussion with CM/SW to assist with obtaining collateral, disposition planning, and the implementation of patient-centered individualized plan of care.  Continue frequent safety checks and vitals per unit protocol.    Risks, benefits and possible side effects of Medications: Risks, benefits, and possible side effects of medications have previously been explained. No new medications at this time.      Legal status: 201    Disposition: to be determined, pending SOAR application for potential group home placement. OT Cognitive Evaluation completed: \"Pt would benefit from discharge to a supportive environment that can provide checks for safety and compliance with IADLs. \"   Although patient's mood has stabilized, they are currently awaiting group home placement.  Their ability to recognize safety hazards take medications and participate in IADLs are significantly impaired by their cognitive deficits compounded by their chronic mental health needs and would be at risk for significant decompensation without the structure and support of a group home setting.      No associated orders from this encounter found during lookback period of 72 hours.  "

## 2024-12-31 NOTE — NURSING NOTE
Pleasant, calm and cooperative. Denies symptoms. About the unit. Quiet and to himself. Compliant with medications. Waiting placement.

## 2024-12-31 NOTE — PLAN OF CARE
Problem: Ineffective Coping  Goal: Participates in unit activities  Description: Interventions:  - Provide therapeutic environment   - Provide required programming   - Redirect inappropriate behaviors   Outcome: Progressing     Problem: Depression  Goal: Treatment Goal: Demonstrate behavioral control of depressive symptoms, verbalize feelings of improved mood/affect, and adopt new coping skills prior to discharge  Outcome: Progressing  Goal: Verbalize thoughts and feelings  Description: Interventions:  - Assess and re-assess patient's level of risk   - Engage patient in 1:1 interactions, daily, for a minimum of 15 minutes   - Encourage patient to express feelings, fears, frustrations, hopes   Outcome: Progressing  Goal: Refrain from harming self  Description: Interventions:  - Monitor patient closely, per order   - Supervise medication ingestion, monitor effects and side effects   Outcome: Progressing  Goal: Refrain from isolation  Description: Interventions:  - Develop a trusting relationship   - Encourage socialization   Outcome: Progressing     Problem: Anxiety  Goal: Anxiety is at manageable level  Description: Interventions:  - Assess and monitor patient's anxiety level.   - Monitor for signs and symptoms (heart palpitations, chest pain, shortness of breath, headaches, nausea, feeling jumpy, restlessness, irritable, apprehensive).   - Collaborate with interdisciplinary team and initiate plan and interventions as ordered.  - [place] patient to unit/surroundings  - Explain treatment plan  - Encourage participation in care  - Encourage verbalization of concerns/fears  - Identify coping mechanisms  - Assist in developing anxiety-reducing skills  - Administer/offer alternative therapies  - Limit or eliminate stimulants  Outcome: Progressing     Problem: Knowledge Deficit  Goal: Patient/family/caregiver demonstrates understanding of disease process, treatment plan, medications, and discharge  instructions  Description: Complete learning assessment and assess knowledge base.  Interventions:  - Provide teaching at level of understanding  - Provide teaching via preferred learning methods  Outcome: Progressing     Problem: SLEEP DISTURBANCE  Goal: Will exhibit normal sleeping pattern  Description: Interventions:  -  Assess the patients sleep pattern, noting recent changes  - Administer medication as ordered  - Decrease environmental stimuli, including noise, as appropriate during the night  - Encourage the patient to actively participate in unit groups and or exercise during the day to enhance ability to achieve adequate sleep at night  - Assess the patient, in the morning, encouraging a description of sleep experience  Outcome: Progressing

## 2024-12-31 NOTE — PROGRESS NOTES
12/31/24 1422   Team Meeting   Meeting Type Daily Rounds   Team Members Present   Team Members Present Physician;Nurse;   Physician Team Member Ary   Nursing Team Member [facility] Management Team Member Noa   Patient/Family Present   Patient Present No   Patient's Family Present No     Pt denies SI/HI/AVH. Pt is medication and meal compliant. Pt's discharge pending placement.

## 2025-01-01 PROCEDURE — 99232 SBSQ HOSP IP/OBS MODERATE 35: CPT | Performed by: PSYCHIATRY & NEUROLOGY

## 2025-01-01 RX ADMIN — LEVOTHYROXINE SODIUM 37.5 MCG: 125 TABLET ORAL at 06:04

## 2025-01-01 RX ADMIN — FAMOTIDINE 20 MG: 20 TABLET ORAL at 08:20

## 2025-01-01 RX ADMIN — ATORVASTATIN CALCIUM 10 MG: 10 TABLET, FILM COATED ORAL at 17:37

## 2025-01-01 RX ADMIN — ARIPIPRAZOLE 5 MG: 5 TABLET ORAL at 08:20

## 2025-01-01 RX ADMIN — Medication 2500 UNITS: at 08:20

## 2025-01-01 RX ADMIN — CYANOCOBALAMIN TAB 1000 MCG 1000 MCG: 1000 TAB at 08:20

## 2025-01-01 RX ADMIN — SERTRALINE HYDROCHLORIDE 150 MG: 100 TABLET ORAL at 08:20

## 2025-01-01 RX ADMIN — SENNOSIDES AND DOCUSATE SODIUM 2 TABLET: 50; 8.6 TABLET ORAL at 17:37

## 2025-01-01 RX ADMIN — FAMOTIDINE 20 MG: 20 TABLET ORAL at 17:37

## 2025-01-01 NOTE — PROGRESS NOTES
"Progress Note - Behavioral Health   Name: Franco Roberson 39 y.o. male I MRN: [id number]  Unit/Bed#: -01 I Date of Admission: 5/14/2024   Date of Service: 1/1/2025 I Hospital Day: 232    Assessment & Plan  MDD (major depressive disorder), recurrent severe, without psychosis (HCC)  Patient continues to do well and the plan is to continue with the current treatment plan as mentioned below with no changes.    Patient continues to be pending group home placement. Otherwise no clinical significant change.  Pt and SW able to have meeting w/ [facility] on 12/13. Waiting for follow up from [facility] by mail.    Medication regimen as follows, no changes as of 12/31/2024:  Abilify 5 mg daily as mood adjunct   Zoloft 150 mg daily for depression and anxiety  Continue to encourage participation in group therapy, milieu therapy and occupational therapy.  Continue to assess for side effects of medications.  Continue collaboration with [facility] for medical co-morbidities as indicated.  Continue discussion with CM/SW to assist with obtaining collateral, disposition planning, and the implementation of patient-centered individualized plan of care.  Continue frequent safety checks and vitals per unit protocol.    Risks, benefits and possible side effects of Medications: Risks, benefits, and possible side effects of medications have previously been explained. No new medications at this time.      Legal status: 201    Disposition: to be determined, pending SOAR application for potential group home placement. OT Cognitive Evaluation completed: \"Pt would benefit from discharge to a supportive environment that can provide checks for safety and compliance with IADLs. \"   Although patient's mood has stabilized, they are currently awaiting group home placement.  Their ability to recognize safety hazards take medications and participate in IADLs are significantly impaired by their cognitive deficits compounded by their chronic mental health needs and would be " at risk for significant decompensation without the structure and support of a group home setting.      No associated orders from this encounter found during lookback period of 72 hours.  Autism spectrum disorder  Continue supportive care    No associated orders from this encounter found during lookback period of 72 hours.      Progress Toward Goals: Improving    Recommended Treatment: Continue with group therapy, milieu therapy and occupational therapy.      Risks, benefits and possible side effects of Medications:   Risks, benefits, and possible side effects of medications explained to patient and patient verbalizes understanding.      History of Present Illness   Behavior over the last 24 hours:  improved  Sleep: normal  Appetite: normal  Medication side effects: No  ROS: no complaints    Subjective: Patient chart reviewed and case discussed with the nurse.  The patient was seen in his room today.  He reports he is doing well and denies any concern.  Rates depression at 2 on a scale of 0-10 with 10 being the worst and anxiety as 0 on a scale of 0-10 with 10 being the worst.  Denies any SI or HI.  Reports sleep and appetite has been good.  Energy level and concentration has been fine.  Denies any SI or HI.  Denies any auditory or visual hallucination.  Did not endorse any paranoia or delusional ideation during the meeting.  Compliant with psychiatric medication and denies any side effect.  As per the nurse the patient has been mostly seclusive to his room.  Seems depressed though denies any other symptoms.  Compliant with medication.    Objective   Mental Status Evaluation:  Appearance:  age appropriate   Behavior:  normal   Speech:  normal pitch and normal volume   Mood:  Mildly depressed   Affect:  normal   Thought Process:  normal   Associations: intact associations   Thought Content:  normal   Perceptual Disturbances: None   Risk Potential: Suicidal Ideations none  Homicidal Ideations none  Potential for  Aggression No   Sensorium:  person, place, and time/date   Memory:  recent and remote memory grossly intact   Consciousness:  alert    Attention: attention span and concentration were age appropriate   Insight:  fair   Judgment: fair   Gait/Station: In bed   Motor Activity: no abnormal movements     Medications: all current active meds have been reviewed.      Lab Results: I have reviewed the following results:  Most Recent Labs:   Lab Results   Component Value Date    WBC 6.75 11/12/2024    RBC 5.06 11/12/2024    HGB 15.9 11/12/2024    HCT 46.2 11/12/2024     11/12/2024    RDW 12.8 11/12/2024    NEUTROABS 4.15 11/12/2024    SODIUM 139 11/12/2024    K 4.0 11/12/2024     11/12/2024    CO2 30 11/12/2024    BUN 17 11/12/2024    CREATININE 0.99 11/12/2024    GLUC 85 11/12/2024    GLUF 85 11/12/2024    CALCIUM 9.4 11/12/2024    AST 23 11/12/2024    ALT 32 11/12/2024    ALKPHOS 73 11/12/2024    TP 8.1 11/12/2024    ALB 4.4 11/12/2024    TBILI 0.54 11/12/2024    CHOLESTEROL 151 11/26/2024    HDL 39 (L) 11/26/2024    TRIG 170 (H) 11/26/2024    LDLCALC 78 11/26/2024    NONHDLC 112 11/26/2024    [id number] 2.532 11/12/2024    FREET4 0.56 (L) 09/10/2024

## 2025-01-01 NOTE — PLAN OF CARE
Problem: Ineffective Coping  Goal: Participates in unit activities  Description: Interventions:  - Provide therapeutic environment   - Provide required programming   - Redirect inappropriate behaviors   Outcome: Progressing     Problem: Depression  Goal: Treatment Goal: Demonstrate behavioral control of depressive symptoms, verbalize feelings of improved mood/affect, and adopt new coping skills prior to discharge  Outcome: Progressing  Goal: Verbalize thoughts and feelings  Description: Interventions:  - Assess and re-assess patient's level of risk   - Engage patient in 1:1 interactions, daily, for a minimum of 15 minutes   - Encourage patient to express feelings, fears, frustrations, hopes   Outcome: Progressing  Goal: Refrain from harming self  Description: Interventions:  - Monitor patient closely, per order   - Supervise medication ingestion, monitor effects and side effects   Outcome: Progressing  Goal: Refrain from isolation  Description: Interventions:  - Develop a trusting relationship   - Encourage socialization   Outcome: Progressing     Problem: Anxiety  Goal: Anxiety is at manageable level  Description: Interventions:  - Assess and monitor patient's anxiety level.   - Monitor for signs and symptoms (heart palpitations, chest pain, shortness of breath, headaches, nausea, feeling jumpy, restlessness, irritable, apprehensive).   - Collaborate with interdisciplinary team and initiate plan and interventions as ordered.  - [place] patient to unit/surroundings  - Explain treatment plan  - Encourage participation in care  - Encourage verbalization of concerns/fears  - Identify coping mechanisms  - Assist in developing anxiety-reducing skills  - Administer/offer alternative therapies  - Limit or eliminate stimulants  Outcome: Progressing     Problem: DISCHARGE PLANNING - CARE MANAGEMENT  Goal: Discharge to post-acute care or home with appropriate resources  Description: INTERVENTIONS:  - Conduct assessment to  determine patient/family and health care team treatment goals, and need for post-acute services based on payer coverage, community resources, and patient preferences, and barriers to discharge  - Address psychosocial, clinical, and financial barriers to discharge as identified in assessment in conjunction with the patient/family and health care team  - Arrange appropriate level of post-acute services according to patient’s   needs and preference and payer coverage in collaboration with the physician and health care team  - Communicate with and update the patient/family, physician, and health care team regarding progress on the discharge plan  - Arrange appropriate transportation to post-acute venues  Outcome: Progressing     Problem: Knowledge Deficit  Goal: Patient/family/caregiver demonstrates understanding of disease process, treatment plan, medications, and discharge instructions  Description: Complete learning assessment and assess knowledge base.  Interventions:  - Provide teaching at level of understanding  - Provide teaching via preferred learning methods  Outcome: Progressing     Problem: SLEEP DISTURBANCE  Goal: Will exhibit normal sleeping pattern  Description: Interventions:  -  Assess the patients sleep pattern, noting recent changes  - Administer medication as ordered  - Decrease environmental stimuli, including noise, as appropriate during the night  - Encourage the patient to actively participate in unit groups and or exercise during the day to enhance ability to achieve adequate sleep at night  - Assess the patient, in the morning, encouraging a description of sleep experience  Outcome: Progressing

## 2025-01-01 NOTE — NURSING NOTE
Patient has been mainly seclusive to his room as of this time. Out for his breakfast tray and to do a couple brief walks about the unit. Appears depressed during interaction but denies all symptoms. Compliant with medications. Waiting placement.

## 2025-01-01 NOTE — NURSING NOTE
Pt visible on the unit and withdrawn to himself.  He is calm and cooperative during interactions and compliant with scheduled medications pt denies HI/SI/AVH, has not reported unmet needs at this time.

## 2025-01-01 NOTE — ASSESSMENT & PLAN NOTE
"Patient continues to do well and the plan is to continue with the current treatment plan as mentioned below with no changes.    Patient continues to be pending group home placement. Otherwise no clinical significant change.  Pt and SW able to have meeting w/ [facility] on 12/13. Waiting for follow up from [facility] by mail.    Medication regimen as follows, no changes as of 12/31/2024:  Abilify 5 mg daily as mood adjunct   Zoloft 150 mg daily for depression and anxiety  Continue to encourage participation in group therapy, milieu therapy and occupational therapy.  Continue to assess for side effects of medications.  Continue collaboration with [facility] for medical co-morbidities as indicated.  Continue discussion with CM/SW to assist with obtaining collateral, disposition planning, and the implementation of patient-centered individualized plan of care.  Continue frequent safety checks and vitals per unit protocol.    Risks, benefits and possible side effects of Medications: Risks, benefits, and possible side effects of medications have previously been explained. No new medications at this time.      Legal status: 201    Disposition: to be determined, pending [facility]AR application for potential group home placement. OT Cognitive Evaluation completed: \"Pt would benefit from discharge to a supportive environment that can provide checks for safety and compliance with IADLs. \"   Although patient's mood has stabilized, they are currently awaiting group home placement.  Their ability to recognize safety hazards take medications and participate in IADLs are significantly impaired by their cognitive deficits compounded by their chronic mental health needs and would be at risk for significant decompensation without the structure and support of a group home setting.      No associated orders from this encounter found during lookback period of 72 hours.  "

## 2025-01-02 PROCEDURE — 99232 SBSQ HOSP IP/OBS MODERATE 35: CPT | Performed by: PSYCHIATRY & NEUROLOGY

## 2025-01-02 RX ADMIN — FAMOTIDINE 20 MG: 20 TABLET ORAL at 08:14

## 2025-01-02 RX ADMIN — LEVOTHYROXINE SODIUM 37.5 MCG: 125 TABLET ORAL at 06:32

## 2025-01-02 RX ADMIN — SENNOSIDES AND DOCUSATE SODIUM 2 TABLET: 50; 8.6 TABLET ORAL at 17:26

## 2025-01-02 RX ADMIN — Medication 2500 UNITS: at 08:14

## 2025-01-02 RX ADMIN — ATORVASTATIN CALCIUM 10 MG: 10 TABLET, FILM COATED ORAL at 17:26

## 2025-01-02 RX ADMIN — CYANOCOBALAMIN TAB 1000 MCG 1000 MCG: 1000 TAB at 08:14

## 2025-01-02 RX ADMIN — SERTRALINE HYDROCHLORIDE 150 MG: 100 TABLET ORAL at 08:14

## 2025-01-02 RX ADMIN — ARIPIPRAZOLE 5 MG: 5 TABLET ORAL at 08:14

## 2025-01-02 RX ADMIN — FAMOTIDINE 20 MG: 20 TABLET ORAL at 17:26

## 2025-01-02 NOTE — ASSESSMENT & PLAN NOTE
"Patient continues to do well and the plan is to continue with the current treatment plan as mentioned below with no changes.    Patient continues to be pending group home placement. Otherwise no clinical significant change.  Pt and SW able to have meeting w/ [facility] on 12/13. Waiting for follow up from [facility] by mail.    Medication regimen as follows, no changes as of 1/2/2025:  Abilify 5 mg daily as mood adjunct   Zoloft 150 mg daily for depression and anxiety  Continue to encourage participation in group therapy, milieu therapy and occupational therapy.  Continue to assess for side effects of medications.  Continue collaboration with [facility] for medical co-morbidities as indicated.  Continue discussion with CM/SW to assist with obtaining collateral, disposition planning, and the implementation of patient-centered individualized plan of care.  Continue frequent safety checks and vitals per unit protocol.    Risks, benefits and possible side effects of Medications: Risks, benefits, and possible side effects of medications have previously been explained. No new medications at this time.      Legal status: 201    Disposition: to be determined, pending [facility]AR application for potential group home placement. OT Cognitive Evaluation completed: \"Pt would benefit from discharge to a supportive environment that can provide checks for safety and compliance with IADLs. \"   Although patient's mood has stabilized, they are currently awaiting group home placement.  Their ability to recognize safety hazards take medications and participate in IADLs are significantly impaired by their cognitive deficits compounded by their chronic mental health needs and would be at risk for significant decompensation without the structure and support of a group home setting.      No associated orders from this encounter found during lookback period of 72 hours.  "

## 2025-01-02 NOTE — PROGRESS NOTES
"Progress Note - Behavioral Health   Name: Franco Roberson 39 y.o. male I MRN: [id number]  Unit/Bed#: -01 I Date of Admission: 5/14/2024   Date of Service: 1/2/2025 I Hospital Day: 233     Assessment & Plan  MDD (major depressive disorder), recurrent severe, without psychosis (HCC)  Patient continues to do well and the plan is to continue with the current treatment plan as mentioned below with no changes.    Patient continues to be pending group home placement. Otherwise no clinical significant change.  Pt and SW able to have meeting w/ [facility] on 12/13. Waiting for follow up from [facility] [place] [facility] by mail.    Medication regimen as follows, no changes as of 1/2/2025:  Abilify 5 mg daily as mood adjunct   Zoloft 150 mg daily for depression and anxiety  Continue to encourage participation in group therapy, milieu therapy and occupational therapy.  Continue to assess for side effects of medications.  Continue collaboration with [facility] for medical co-morbidities as indicated.  Continue discussion with CM/SW to assist with obtaining collateral, disposition planning, and the implementation of patient-centered individualized plan of care.  Continue frequent safety checks and vitals per unit protocol.    Risks, benefits and possible side effects of Medications: Risks, benefits, and possible side effects of medications have previously been explained. No new medications at this time.      Legal status: 201    Disposition: to be determined, pending SOAR application for potential group home placement. OT Cognitive Evaluation completed: \"Pt would benefit from discharge to a supportive environment that can provide checks for safety and compliance with IADLs. \"   Although patient's mood has stabilized, they are currently awaiting group home placement.  Their ability to recognize safety hazards take medications and participate in IADLs are significantly impaired by their cognitive deficits compounded by their chronic mental health needs and would be " at risk for significant decompensation without the structure and support of a group home setting.      No associated orders from this encounter found during lookback period of 72 hours.  Autism spectrum disorder  Continue supportive care    No associated orders from this encounter found during lookback period of 72 hours.        Plan     Recommended Treatment:    - Encourage early mobility and having a structured day  - Provide frequent re-orientation, and cognitive stimulation  - Ensure assistive devices are in proper working order (eye-glasses, hearing aids)  - Encourage adequate hydration, nutrition and monitor bowel movements  - Maintain sleep-wake cycle: Uninterrupted sleep time; low-level lighting at night  - Fall precaution  - f/u [facility] recs regarding the medical problems   - Continue medication titration and treatment plan; adjust medication to optimize treatment response and as clinically indicated. .   - Observation: routine            - VS: as per unit protocol  - Diet: Regular diet  - Encourage group attendance and milieu therapy  - Dispo: To be determined     Scheduled medications:  Current Facility-Administered Medications   Medication Dose Route Frequency Provider Last Rate    acetaminophen  650 mg Oral Q6H PRN Basilio Brown MD      acetaminophen  650 mg Oral Q4H PRN Basilio Brown MD      acetaminophen  975 mg Oral Q6H PRN Basilio Brown MD      aluminum-magnesium hydroxide-simethicone  30 mL Oral Q4H PRN Basilio Brown MD      ARIPiprazole  5 mg Oral Daily Basilio Panda MD      Artificial Tears  1 drop Both Eyes Q3H PRN Basilio Brown MD      atorvastatin  10 mg Oral Daily With Dinner WALLY Baptiste      benztropine  1 mg Intramuscular Q4H PRN Max 6/day Basilio Brown MD      benztropine  1 mg Oral Q4H PRN Max 6/day Basilio Brown MD      Cholecalciferol  2,500 Units Oral Daily Dustin Mcallister MD      cyanocobalamin  1,000 mcg Oral Daily  WALLY Baptiste      Diclofenac Sodium  2 g Topical 4x Daily PRN WALLY Baptiste      hydrOXYzine HCL  50 mg Oral Q6H PRN Max 4/day Basilio Brown MD      Or    diphenhydrAMINE  50 mg Intramuscular Q6H PRN Basilio Brown MD      diphenhydrAMINE-zinc acetate   Topical Daily PRN Raj Guerrero MD      famotidine  20 mg Oral BID WALLY Baptiste      hydrOXYzine HCL  100 mg Oral Q6H PRN Max 4/day Basilio Brown MD      Or    LORazepam  2 mg Intramuscular Q6H PRN Basilio Brown MD      hydrOXYzine HCL  25 mg Oral Q6H PRN Max 4/day Basilio Brown MD      levothyroxine  37.5 mcg Oral Early Morning WALLY Baptiste      melatonin  6 mg Oral HS PRN WALLY Gomez      methocarbamol  500 mg Oral Q6H PRN WALLY Baptiste      OLANZapine  5 mg Oral Q4H PRN Max 3/day Basilio Brown MD      Or    OLANZapine  2.5 mg Intramuscular Q4H PRN Max 3/day Basilio Brown MD      OLANZapine  5 mg Oral Q3H PRN Max 3/day Basilio Brown MD      Or    OLANZapine  5 mg Intramuscular Q3H PRN Max 3/day Basilio Brown MD      OLANZapine  2.5 mg Oral Q4H PRN Max 6/day Basilio Brown MD      polyethylene glycol  17 g Oral Daily PRN Basilio Brown MD      propranolol  10 mg Oral Q8H PRN Basilio Brown MD      senna-docusate sodium  2 tablet Oral After Dinner Dustin Mcallister MD      sertraline  150 mg Oral Daily WALLY Gomez      traZODone  50 mg Oral HS PRN Basilio Brown MD        PRN:    acetaminophen    acetaminophen    acetaminophen    aluminum-magnesium hydroxide-simethicone    Artificial Tears    benztropine    benztropine    Diclofenac Sodium    hydrOXYzine HCL **OR** diphenhydrAMINE    diphenhydrAMINE-zinc acetate    hydrOXYzine HCL **OR** LORazepam    hydrOXYzine HCL    melatonin    methocarbamol    OLANZapine **OR** OLANZapine    OLANZapine **OR** OLANZapine    OLANZapine    polyethylene  "glycol    propranolol    traZODone       Subjective     Patient was visited on unit for continuing care; chart reviewed and discussed with multidisciplinary treatment team.  On approach, the patient was calm and cooperative.  Patient stated that he was feeling a little bit more down today secondary to the new year.  He notes that this is the first new year that he has had without his mother.  I was able to discuss with patient the general timeline regarding grief/morning especially regarding specific milestones and how this usually takes at least about a year.  Patient seemed to take in psychoeducation.  No problem initiating and maintaining sleep.  Denied A/VH currently.  Denied SI/HI, intent or plan upon direct inquiry at this time.    Patient continues to be visible in the milieu and interacts with staff and peers. No reports of aggression or self-injurious behavior on unit. No PRN medications used in the past 24 hours.    Patient accepted all offered medications and no adverse effects of medications noted or reported.    Objective    Current Mental Status Evaluation:  Appearance: casually dressed, consistent with stated age  Motor: no psychomotor retardation, no gait abnormalities  Behavior: cooperative, answers questions appropriately  Speech: soft, normal rhythm  Mood: \"a little down\"  Affect: constricted, depressed-appearing  Thought Process: linear and goal-oriented  Thought Content: denies delusions  Risk Potential: denies suicidal ideation, plan, or intent. Denies homicidal ideation  Perceptions: denies auditory hallucinations, denies visual hallucinations,   Sensorium: Oriented to person, place, time, and situation  Cognition: cognitive ability appears intact but was not quantitatively tested  Consciousness: alert and awake  Attention: intact, able to focus without difficulty  Insight: limited  Judgement: limited            Vital signs in last 24 hours:    Temp:  [96.8 °F (36 °C)-97.2 °F (36.2 °C)] 96.8 " °F (36 °C)  HR:  [66-76] 66  BP: (107-118)/(63-73) 118/63  Resp:  [17] 17  SpO2:  [96 %-97 %] 97 %  O2 Device: None (Room air)    Psychiatric Review of Systems:  Medication adverse effects: none  Sleep: unchanged  Appetite: unchanged  Behaviors over the past 24 hours: unchanged    Laboratory results:    I have personally reviewed all pertinent laboratory/tests results  No results found for this or any previous visit (from the past 48 hours).       Progress Toward Goals & Illness Status:   progressing, attends groups, participates in milieu therapy, mood is stabilizing, discharge planning, placement pending    Patient is not at goal. They are not yet ready for discharge. The patient's condition currently requires active psychopharmacological medication management, interdisciplinary coordination with case management, and the utilization of adjunctive milieu and group therapy to augment psychopharmacological efficacy. The patient's risk of morbidity, and progression or decompensation of psychiatric disease, is higher without this current treatment.     Next of Kin  Extended Emergency Contact Information  Primary Emergency Contact: Lois Roberson  Address: [address] [place] [address]  Home Phone: [phone]  Relation: Mother    Counseling / Coordination of Care  Patient's progress discussed with staff in treatment team meeting.  Medications, treatment progress and treatment plan reviewed with patient.  Medication education provided to patient.  Educated on importance of medication and treatment compliance.  Supportive therapy provided to patient.  Cognitive techniques utilized during the session.  Reassurance and supportive therapy provided.  Reoriented to reality and reassured.  Encouraged participation in milieu and group therapy on the unit.  Crisis/safety plan discussed with patient.       Dustin Mcallister MD  Attending Psychiatrist   [facility]  [facility]      This note has been constructed using a voice recognition system. There may be translation, syntax, or grammatical errors. If you have any questions, please contact the dictating provider.

## 2025-01-02 NOTE — NURSING NOTE
Patient secluded to room out to make needs known and retrieve meal tray. Patient appears depressed and withdrawn although denies depression /SI/HI/AH/VH at this time. Compliant with medication and routine vitals.

## 2025-01-02 NOTE — NURSING NOTE
Pt is calm and cooperative, isolative to room. Appears depressed but denies.  Medication and meal compliant. Denies SI, HI, AH, and VH. Denies any needs at this time.

## 2025-01-02 NOTE — PLAN OF CARE
Problem: Ineffective Coping  Goal: Participates in unit activities  Description: Interventions:  - Provide therapeutic environment   - Provide required programming   - Redirect inappropriate behaviors   Outcome: Progressing     Problem: Depression  Goal: Treatment Goal: Demonstrate behavioral control of depressive symptoms, verbalize feelings of improved mood/affect, and adopt new coping skills prior to discharge  Outcome: Progressing  Goal: Verbalize thoughts and feelings  Description: Interventions:  - Assess and re-assess patient's level of risk   - Engage patient in 1:1 interactions, daily, for a minimum of 15 minutes   - Encourage patient to express feelings, fears, frustrations, hopes   Outcome: Progressing  Goal: Refrain from harming self  Description: Interventions:  - Monitor patient closely, per order   - Supervise medication ingestion, monitor effects and side effects   Outcome: Progressing  Goal: Refrain from isolation  Description: Interventions:  - Develop a trusting relationship   - Encourage socialization   Outcome: Progressing     Problem: DISCHARGE PLANNING - CARE MANAGEMENT  Goal: Discharge to post-acute care or home with appropriate resources  Description: INTERVENTIONS:  - Conduct assessment to determine patient/family and health care team treatment goals, and need for post-acute services based on payer coverage, community resources, and patient preferences, and barriers to discharge  - Address psychosocial, clinical, and financial barriers to discharge as identified in assessment in conjunction with the patient/family and health care team  - Arrange appropriate level of post-acute services according to patient’s   needs and preference and payer coverage in collaboration with the physician and health care team  - Communicate with and update the patient/family, physician, and health care team regarding progress on the discharge plan  - Arrange appropriate transportation to post-acute  venues  Outcome: Progressing     Problem: Anxiety  Goal: Anxiety is at manageable level  Description: Interventions:  - Assess and monitor patient's anxiety level.   - Monitor for signs and symptoms (heart palpitations, chest pain, shortness of breath, headaches, nausea, feeling jumpy, restlessness, irritable, apprehensive).   - Collaborate with interdisciplinary team and initiate plan and interventions as ordered.  - [place] patient to unit/surroundings  - Explain treatment plan  - Encourage participation in care  - Encourage verbalization of concerns/fears  - Identify coping mechanisms  - Assist in developing anxiety-reducing skills  - Administer/offer alternative therapies  - Limit or eliminate stimulants  Outcome: Progressing     Problem: Knowledge Deficit  Goal: Patient/family/caregiver demonstrates understanding of disease process, treatment plan, medications, and discharge instructions  Description: Complete learning assessment and assess knowledge base.  Interventions:  - Provide teaching at level of understanding  - Provide teaching via preferred learning methods  Outcome: Progressing     Problem: SLEEP DISTURBANCE  Goal: Will exhibit normal sleeping pattern  Description: Interventions:  -  Assess the patients sleep pattern, noting recent changes  - Administer medication as ordered  - Decrease environmental stimuli, including noise, as appropriate during the night  - Encourage the patient to actively participate in unit groups and or exercise during the day to enhance ability to achieve adequate sleep at night  - Assess the patient, in the morning, encouraging a description of sleep experience  Outcome: Progressing

## 2025-01-03 PROCEDURE — 99232 SBSQ HOSP IP/OBS MODERATE 35: CPT | Performed by: PSYCHIATRY & NEUROLOGY

## 2025-01-03 RX ADMIN — ARIPIPRAZOLE 5 MG: 5 TABLET ORAL at 08:23

## 2025-01-03 RX ADMIN — Medication 2500 UNITS: at 08:23

## 2025-01-03 RX ADMIN — LEVOTHYROXINE SODIUM 37.5 MCG: 125 TABLET ORAL at 06:37

## 2025-01-03 RX ADMIN — CYANOCOBALAMIN TAB 1000 MCG 1000 MCG: 1000 TAB at 08:23

## 2025-01-03 RX ADMIN — FAMOTIDINE 20 MG: 20 TABLET ORAL at 08:23

## 2025-01-03 RX ADMIN — FAMOTIDINE 20 MG: 20 TABLET ORAL at 17:20

## 2025-01-03 RX ADMIN — SERTRALINE HYDROCHLORIDE 150 MG: 100 TABLET ORAL at 08:23

## 2025-01-03 RX ADMIN — ATORVASTATIN CALCIUM 10 MG: 10 TABLET, FILM COATED ORAL at 17:20

## 2025-01-03 RX ADMIN — SENNOSIDES AND DOCUSATE SODIUM 2 TABLET: 50; 8.6 TABLET ORAL at 17:20

## 2025-01-03 NOTE — PLAN OF CARE
Problem: Depression  Goal: Treatment Goal: Demonstrate behavioral control of depressive symptoms, verbalize feelings of improved mood/affect, and adopt new coping skills prior to discharge  Outcome: Progressing  Goal: Verbalize thoughts and feelings  Description: Interventions:  - Assess and re-assess patient's level of risk   - Engage patient in 1:1 interactions, daily, for a minimum of 15 minutes   - Encourage patient to express feelings, fears, frustrations, hopes   Outcome: Progressing  Goal: Refrain from harming self  Description: Interventions:  - Monitor patient closely, per order   - Supervise medication ingestion, monitor effects and side effects   Outcome: Progressing  Goal: Refrain from isolation  Description: Interventions:  - Develop a trusting relationship   - Encourage socialization   Outcome: Progressing     Problem: Anxiety  Goal: Anxiety is at manageable level  Description: Interventions:  - Assess and monitor patient's anxiety level.   - Monitor for signs and symptoms (heart palpitations, chest pain, shortness of breath, headaches, nausea, feeling jumpy, restlessness, irritable, apprehensive).   - Collaborate with interdisciplinary team and initiate plan and interventions as ordered.  - [place] patient to unit/surroundings  - Explain treatment plan  - Encourage participation in care  - Encourage verbalization of concerns/fears  - Identify coping mechanisms  - Assist in developing anxiety-reducing skills  - Administer/offer alternative therapies  - Limit or eliminate stimulants  Outcome: Progressing     Problem: DISCHARGE PLANNING - CARE MANAGEMENT  Goal: Discharge to post-acute care or home with appropriate resources  Description: INTERVENTIONS:  - Conduct assessment to determine patient/family and health care team treatment goals, and need for post-acute services based on payer coverage, community resources, and patient preferences, and barriers to discharge  - Address psychosocial, clinical,  and financial barriers to discharge as identified in assessment in conjunction with the patient/family and health care team  - Arrange appropriate level of post-acute services according to patient’s   needs and preference and payer coverage in collaboration with the physician and health care team  - Communicate with and update the patient/family, physician, and health care team regarding progress on the discharge plan  - Arrange appropriate transportation to post-acute venues  Outcome: Progressing     Problem: Knowledge Deficit  Goal: Patient/family/caregiver demonstrates understanding of disease process, treatment plan, medications, and discharge instructions  Description: Complete learning assessment and assess knowledge base.  Interventions:  - Provide teaching at level of understanding  - Provide teaching via preferred learning methods  Outcome: Progressing     Problem: SLEEP DISTURBANCE  Goal: Will exhibit normal sleeping pattern  Description: Interventions:  -  Assess the patients sleep pattern, noting recent changes  - Administer medication as ordered  - Decrease environmental stimuli, including noise, as appropriate during the night  - Encourage the patient to actively participate in unit groups and or exercise during the day to enhance ability to achieve adequate sleep at night  - Assess the patient, in the morning, encouraging a description of sleep experience  Outcome: Progressing

## 2025-01-03 NOTE — NURSING NOTE
Patient has been seclusive to his room. C/o feeling tired. Reports poor sleep last night due to behavioral issue with peer on the unit. Denies symptoms. Pleasant and cooperative. Waiting placement.

## 2025-01-03 NOTE — ASSESSMENT & PLAN NOTE
"Patient continues to do well and the plan is to continue with the current treatment plan as mentioned below with no changes.    Patient continues to be pending group home placement. Otherwise no clinical significant change.  Pt and SW able [place] have [place] [facility] [place]. Waiting for follow [place] [facility] [place] [facility] [place].    Medication regimen as follows, no changes as of 1/3/2025:  Abilify 5 mg daily as mood adjunct   Zoloft 150 mg daily for depression and anxiety  Continue to encourage participation in group therapy, milieu therapy and occupational therapy.  Continue to assess for side effects of medications.  Continue collaboration with [facility] for medical co-morbidities as indicated.  Continue discussion with CM/SW to assist with obtaining collateral, disposition planning, and the implementation of patient-centered individualized plan of care.  Continue frequent safety checks and vitals per unit protocol.    Risks, benefits and possible side effects of Medications: Risks, benefits, and possible side effects of medications have previously been explained. No new medications at this time.      Legal status: 201    Disposition: to be determined, pending SOAR application for potential group home placement. OT Cognitive Evaluation completed: \"Pt would benefit from discharge to a supportive environment that can provide checks for safety and compliance with IADLs. \"   Although patient's mood has stabilized, they are currently awaiting group home placement.  Their ability to recognize safety hazards take medications and participate in IADLs are significantly impaired by their cognitive deficits compounded by their chronic mental health needs and would be at risk for significant decompensation without the structure and support of a group home setting.      No associated orders from this encounter found during lookback period of 72 hours.  "

## 2025-01-03 NOTE — PROGRESS NOTES
"Progress Note - Behavioral Health   Name: Franco Roberson 39 y.o. male I MRN: [id number]  Unit/Bed#: -01 I Date of Admission: 5/14/2024   Date of Service: 1/3/2025 I Hospital Day: 234     Assessment & Plan  MDD (major depressive disorder), recurrent severe, without psychosis (HCC)  Patient continues to do well and the plan is to continue with the current treatment plan as mentioned below with no changes.    Patient continues to be pending group home placement. Otherwise no clinical significant change.  Pt and SW able to have meeting w/ [facility] on 12/13. Waiting for [place] up from [facility] [place].    Medication regimen as follows, no changes as of 1/3/2025:  Abilify 5 mg daily as mood adjunct   Zoloft 150 mg daily for depression and anxiety  Continue to encourage participation in group therapy, milieu therapy and occupational therapy.  Continue to assess for side effects of medications.  Continue collaboration with [facility] for medical co-morbidities as indicated.  Continue discussion with CM/SW to assist with obtaining collateral, disposition planning, and the implementation of patient-centered individualized plan of care.  Continue frequent safety checks and vitals per unit protocol.    Risks, benefits and possible side effects of Medications: Risks, benefits, and possible side effects of medications have previously been explained. No new medications at this time.      Legal status: 201    Disposition: to be determined, pending SOAR application for potential group home placement. OT Cognitive Evaluation completed: \"Pt would benefit from discharge to a supportive environment that can provide checks for safety and compliance with IADLs. \"   Although patient's mood has stabilized, they are currently awaiting group home placement.  Their ability to recognize safety hazards take medications and participate in IADLs are significantly impaired by their cognitive deficits compounded by their chronic mental health needs and would be " at risk for significant decompensation without the structure and support of a group home setting.      No associated orders from this encounter found during lookback period of 72 hours.  Autism spectrum disorder  Continue supportive care    No associated orders from this encounter found during lookback period of 72 hours.        Plan     Recommended Treatment:    - Encourage early mobility and having a structured day  - Provide frequent re-orientation, and cognitive stimulation  - Ensure assistive devices are in proper working order (eye-glasses, hearing aids)  - Encourage adequate hydration, nutrition and monitor bowel movements  - Maintain sleep-wake cycle: Uninterrupted sleep time; low-level lighting at night  - Fall precaution  - f/u [facility] recs regarding the medical problems   - Continue medication titration and treatment plan; adjust medication to optimize treatment response and as clinically indicated. .   - Observation: routine            - VS: as per unit protocol  - Diet: Regular diet  - Encourage group attendance and milieu therapy  - Dispo: To be determined     Scheduled medications:  Current Facility-Administered Medications   Medication Dose Route Frequency Provider Last Rate    acetaminophen  650 mg Oral Q6H PRN Basilio Brown MD      acetaminophen  650 mg Oral Q4H PRN Basilio Brown MD      acetaminophen  975 mg Oral Q6H PRN Basilio Brown MD      aluminum-magnesium hydroxide-simethicone  30 mL Oral Q4H PRN Basilio Brown MD      ARIPiprazole  5 mg Oral Daily Basilio Panda MD      Artificial Tears  1 drop Both Eyes Q3H PRN Basilio Brown MD      atorvastatin  10 mg Oral Daily With Dinner WALLY Baptiste      benztropine  1 mg Intramuscular Q4H PRN Max 6/day Basilio Brown MD      benztropine  1 mg Oral Q4H PRN Max 6/day Basilio Brown MD      Cholecalciferol  2,500 Units Oral Daily Dustin Mcallister MD      cyanocobalamin  1,000 mcg Oral Daily  WALLY Baptiste      Diclofenac Sodium  2 g Topical 4x Daily PRN WALLY Baptiste      hydrOXYzine HCL  50 mg Oral Q6H PRN Max 4/day Basilio Brown MD      Or    diphenhydrAMINE  50 mg Intramuscular Q6H PRN Basilio Brown MD      diphenhydrAMINE-zinc acetate   Topical Daily PRN Raj Guerrero MD      famotidine  20 mg Oral BID WALLY Baptiste      hydrOXYzine HCL  100 mg Oral Q6H PRN Max 4/day Basilio Brown MD      Or    LORazepam  2 mg Intramuscular Q6H PRN Basilio Brown MD      hydrOXYzine HCL  25 mg Oral Q6H PRN Max 4/day Basilio Brown MD      levothyroxine  37.5 mcg Oral Early Morning WALLY Baptiste      melatonin  6 mg Oral HS PRN WALLY Gomez      methocarbamol  500 mg Oral Q6H PRN WALLY Baptiste      OLANZapine  5 mg Oral Q4H PRN Max 3/day Basilio Brown MD      Or    OLANZapine  2.5 mg Intramuscular Q4H PRN Max 3/day Basilio Brown MD      OLANZapine  5 mg Oral Q3H PRN Max 3/day Basilio Brown MD      Or    OLANZapine  5 mg Intramuscular Q3H PRN Max 3/day Basilio Brown MD      OLANZapine  2.5 mg Oral Q4H PRN Max 6/day Basilio Brown MD      polyethylene glycol  17 g Oral Daily PRN Basilio Brown MD      propranolol  10 mg Oral Q8H PRN Basilio Brown MD      senna-docusate sodium  2 tablet Oral After Dinner Dustin Mcallister MD      sertraline  150 mg Oral Daily WALLY Gomez      traZODone  50 mg Oral HS PRN Basilio Brown MD        PRN:    acetaminophen    acetaminophen    acetaminophen    aluminum-magnesium hydroxide-simethicone    Artificial Tears    benztropine    benztropine    Diclofenac Sodium    hydrOXYzine HCL **OR** diphenhydrAMINE    diphenhydrAMINE-zinc acetate    hydrOXYzine HCL **OR** LORazepam    hydrOXYzine HCL    melatonin    methocarbamol    OLANZapine **OR** OLANZapine    OLANZapine **OR** OLANZapine    OLANZapine    polyethylene  "glycol    propranolol    traZODone       Subjective     Patient was visited on unit for continuing care; chart reviewed and discussed with multidisciplinary treatment team.  On approach, the patient was calm and cooperative. Denied any changes in mood, appetite, and energy level. Reports difficulty getting to sleep yesterday 2nd to commotion on unit. Denied A/VH currently.  Denied SI/HI, intent or plan upon direct inquiry at this time.    Patient continues to be visible in the milieu and interacts with staff and peers. No reports of aggression or self-injurious behavior on unit. No PRN medications used in the past 24 hours.    Patient accepted all offered medications and no adverse effects of medications noted or reported.    Objective    Current Mental Status Evaluation:  Mental Status Exam  Appearance: casually dressed, consistent with stated age  Motor: no psychomotor retardation, no gait abnormalities  Behavior: cooperative, answers questions appropriately  Speech: soft, normal rhythm  Mood: \"good\"  Affect: constricted  Thought Process: linear and goal-oriented  Thought Content: denies delusions  Risk Potential: denies suicidal ideation, plan, or intent. Denies homicidal ideation  Perceptions: denies auditory hallucinations, denies visual hallucinations,   Sensorium: Oriented to person, place, time, and situation  Cognition: cognitive ability appears intact but was not quantitatively tested  Consciousness: alert and awake  Attention: intact, able to focus without difficulty  Insight: limited  Judgement: limited          Vital signs in last 24 hours:    Temp:  [97.5 °F (36.4 °C)-97.6 °F (36.4 °C)] 97.5 °F (36.4 °C)  HR:  [74-82] 74  BP: (116-130)/(63-71) 116/71  Resp:  [17] 17  SpO2:  [96 %-97 %] 97 %  O2 Device: None (Room air)    Psychiatric Review of Systems:  Medication adverse effects: none  Sleep: unchanged  Appetite: unchanged  Behaviors over the past 24 hours: unchanged    Laboratory results:    I have " personally reviewed all pertinent laboratory/tests results  No results found for this or any previous visit (from the past 48 hours).       Progress Toward Goals & Illness Status:   attends groups, participates in milieu therapy, mood is stabilizing, discharge planning, placement pending    Patient is not at goal. They are not yet ready for discharge. The patient's condition currently requires active psychopharmacological medication management, interdisciplinary coordination with case management, and the utilization of adjunctive milieu and group therapy to augment psychopharmacological efficacy. The patient's risk of morbidity, and progression or decompensation of psychiatric disease, is higher without this current treatment.     Next of Kin  Extended Emergency Contact Information  Primary Emergency Contact: Lois Roberson  Address: [address] [place] [address]  Home Phone: [phone]  Relation: Mother    Counseling / Coordination of Care  Patient's progress discussed with staff in treatment team meeting.  Medications, treatment progress and treatment plan reviewed with patient.  Medication education provided to patient.  Educated on importance of medication and treatment compliance.  Supportive therapy provided to patient.  Cognitive techniques utilized during the session.  Reassurance and supportive therapy provided.  Reoriented to reality and reassured.  Encouraged participation in milieu and group therapy on the unit.  Crisis/safety plan discussed with patient.       Dustin Mcallister MD  Attending Psychiatrist   [facility]      This note has been constructed using a voice recognition system. There may be translation, syntax, or grammatical errors. If you have any questions, please contact the dictating provider.

## 2025-01-04 PROCEDURE — 99232 SBSQ HOSP IP/OBS MODERATE 35: CPT | Performed by: PSYCHIATRY & NEUROLOGY

## 2025-01-04 RX ADMIN — SENNOSIDES AND DOCUSATE SODIUM 2 TABLET: 50; 8.6 TABLET ORAL at 17:49

## 2025-01-04 RX ADMIN — ARIPIPRAZOLE 5 MG: 5 TABLET ORAL at 08:45

## 2025-01-04 RX ADMIN — FAMOTIDINE 20 MG: 20 TABLET ORAL at 17:49

## 2025-01-04 RX ADMIN — ATORVASTATIN CALCIUM 10 MG: 10 TABLET, FILM COATED ORAL at 17:49

## 2025-01-04 RX ADMIN — CYANOCOBALAMIN TAB 1000 MCG 1000 MCG: 1000 TAB at 08:45

## 2025-01-04 RX ADMIN — SERTRALINE HYDROCHLORIDE 150 MG: 100 TABLET ORAL at 08:45

## 2025-01-04 RX ADMIN — Medication 2500 UNITS: at 08:45

## 2025-01-04 RX ADMIN — LEVOTHYROXINE SODIUM 37.5 MCG: 125 TABLET ORAL at 06:07

## 2025-01-04 RX ADMIN — FAMOTIDINE 20 MG: 20 TABLET ORAL at 08:45

## 2025-01-04 NOTE — ASSESSMENT & PLAN NOTE
"Patient continues to do well and the plan is to continue with the current treatment plan as mentioned below with no changes.    Patient continues to be pending group home placement. Otherwise no clinical significant change.  Pt and SW able to have meeting w/ [facility] [place]. Waiting for follow up from [facility] [place] mail.    Medication regimen as follows, no changes as of 1/4/2025:  Abilify 5 mg daily as mood adjunct   Zoloft 150 mg daily for depression and anxiety  Continue to encourage participation in group therapy, milieu therapy and occupational therapy.  Continue to assess for side effects of medications.  Continue collaboration with [facility] for medical co-morbidities as indicated.  Continue discussion with CM/SW to assist with obtaining collateral, disposition planning, and the implementation of patient-centered individualized plan of care.  Continue frequent safety checks and vitals per unit protocol.    Risks, benefits and possible side effects of Medications: Risks, benefits, and possible side effects of medications have previously been explained. No new medications at this time.      Legal status: 201    Disposition: to be determined, pending SOAR application for potential group home placement. OT Cognitive Evaluation completed: \"Pt would benefit from discharge to a supportive environment that can provide checks for safety and compliance with IADLs. \"   Although patient's mood has stabilized, they are currently awaiting group home placement.  Their ability to recognize safety hazards take medications and participate in IADLs are significantly impaired by their cognitive deficits compounded by their chronic mental health needs and would be at risk for significant decompensation without the structure and support of a group home setting.      No associated orders from this encounter found during lookback period of 72 hours.  "

## 2025-01-04 NOTE — NURSING NOTE
Patient secluded to room at beginning of shift, out on unit walking withdrawn to self. Patient appears depressed and disheveled. Patient denies depression and SI/HI/AH/VH at this time. Compliant with medications and routine care.

## 2025-01-04 NOTE — PLAN OF CARE
Problem: Ineffective Coping  Goal: Participates in unit activities  Description: Interventions:  - Provide therapeutic environment   - Provide required programming   - Redirect inappropriate behaviors   Outcome: Progressing     Problem: Depression  Goal: Treatment Goal: Demonstrate behavioral control of depressive symptoms, verbalize feelings of improved mood/affect, and adopt new coping skills prior to discharge  Outcome: Progressing  Goal: Verbalize thoughts and feelings  Description: Interventions:  - Assess and re-assess patient's level of risk   - Engage patient in 1:1 interactions, daily, for a minimum of 15 minutes   - Encourage patient to express feelings, fears, frustrations, hopes   Outcome: Progressing  Goal: Refrain from harming self  Description: Interventions:  - Monitor patient closely, per order   - Supervise medication ingestion, monitor effects and side effects   Outcome: Progressing  Goal: Refrain from isolation  Description: Interventions:  - Develop a trusting relationship   - Encourage socialization   Outcome: Progressing     Problem: Anxiety  Goal: Anxiety is at manageable level  Description: Interventions:  - Assess and monitor patient's anxiety level.   - Monitor for signs and symptoms (heart palpitations, chest pain, shortness of breath, headaches, nausea, feeling jumpy, restlessness, irritable, apprehensive).   - Collaborate with interdisciplinary team and initiate plan and interventions as ordered.  - [place] patient to unit/surroundings  - Explain treatment plan  - Encourage participation in care  - Encourage verbalization of concerns/fears  - Identify coping mechanisms  - Assist in developing anxiety-reducing skills  - Administer/offer alternative therapies  - Limit or eliminate stimulants  Outcome: Progressing     Problem: DISCHARGE PLANNING - CARE MANAGEMENT  Goal: Discharge to post-acute care or home with appropriate resources  Description: INTERVENTIONS:  - Conduct assessment to  determine patient/family and health care team treatment goals, and need for post-acute services based on payer coverage, community resources, and patient preferences, and barriers to discharge  - Address psychosocial, clinical, and financial barriers to discharge as identified in assessment in conjunction with the patient/family and health care team  - Arrange appropriate level of post-acute services according to patient’s   needs and preference and payer coverage in collaboration with the physician and health care team  - Communicate with and update the patient/family, physician, and health care team regarding progress on the discharge plan  - Arrange appropriate transportation to post-acute venues  Outcome: Progressing     Problem: Knowledge Deficit  Goal: Patient/family/caregiver demonstrates understanding of disease process, treatment plan, medications, and discharge instructions  Description: Complete learning assessment and assess knowledge base.  Interventions:  - Provide teaching at level of understanding  - Provide teaching via preferred learning methods  Outcome: Progressing     Problem: SLEEP DISTURBANCE  Goal: Will exhibit normal sleeping pattern  Description: Interventions:  -  Assess the patients sleep pattern, noting recent changes  - Administer medication as ordered  - Decrease environmental stimuli, including noise, as appropriate during the night  - Encourage the patient to actively participate in unit groups and or exercise during the day to enhance ability to achieve adequate sleep at night  - Assess the patient, in the morning, encouraging a description of sleep experience  Outcome: Progressing

## 2025-01-04 NOTE — NURSING NOTE
Patient out for needs and meds.  He is calm and pleasant.  Patient is med/meal compliant.   Subjective:     CC:  Diagnoses of Colitis, Diarrhea due to malabsorption, and MARIVEL (acute kidney injury) (HCC) were pertinent to this visit.    HISTORY OF THE PRESENT ILLNESS: Patient is a 68 y.o. female.  This is a patient of Dr. Kermit Bergman MD.  This pleasant patient is here today to discuss:    1. Colitis  2. Diarrhea due to malabsorption  3. MARIVEL (acute kidney injury) (HCC)  Patient presented to urgent care on 9/11/2023 (discharge 9/15/2023) and was urged to proceed to ER for diarrhea, bloody stool, vomiting.  She was found to be in acute renal failure with a diagnosis of immunotherapy induced colitis.    She was prescribed a course of oral corticosteroids and fluid resuscitation with rapid improvement of symptoms.      She has been having some loose stool but no diarrhea, her cramps have resolved, no bloody stool, no vomiting.  Her appetite has returned and she is eating solid foods without difficulty.  Overall she is feeling much better.    Oncology has prescribed her a steroid taper and she has any necessary medications.    She has a follow-up with oncology this afternoon at 130 where the plan is to also provide her with some additional IV fluids.    Active Diagnosis:    Patient Active Problem List   Diagnosis    Dyslipidemia    Anxiety disorder    Status post mastectomy    Essential hypertension    History of breast cancer    Stage 3a chronic kidney disease (HCC)    Other fatigue    Dysuria    Other specified anemias    Left upper lobe pulmonary nodule    History of radiation therapy    Former smoker    NSCLC of left lung (HCC)    MARIVEL (acute kidney injury) (HCC)    Renal mass    Diarrhea    Metabolic acidosis    Hyponatremia    Colitis      Current Outpatient Medications Ordered in Epic   Medication Sig Dispense Refill    hydrocortisone (ANUSOL-HC) 25 MG Suppos UNWRAP AND INSERT 1 SUPPOSITORY RECTALLY AND RETAIN ONCE DAILY AT BEDTIME FOR 10 DAYS THEN ONCE DAILY AS NEEDED      VITAMIN D PO 1 capsule every  "day by oral route.      predniSONE (DELTASONE) 10 MG Tab Take 5 Tablets by mouth every day for 7 days, THEN 4 Tablets every day for 7 days, THEN 3 Tablets every day for 7 days, THEN 2 Tablets every day for 7 days, THEN 1 Tablet every day for 7 days. 105 Tablet 0    metoprolol SR (TOPROL XL) 25 MG TABLET SR 24 HR Take 12.5 mg by mouth every day.      venlafaxine (EFFEXOR) 75 MG Tab Take 1 Tablet by mouth every day. 90 Tablet 0    folic acid (FOLVITE) 1 MG Tab Take 1 Tablet by mouth every day. 30 Tablet 3    pravastatin (PRAVACHOL) 40 MG tablet Take 1 Tablet by mouth every day. 100 Tablet 3    lisinopril (PRINIVIL) 10 MG Tab Take 1 Tablet by mouth every day. 100 Tablet 3    fenofibrate micronized (LOFIBRA) 134 MG capsule TAKE 1 CAPSULE BY MOUTH EVERY DAY 90 Capsule 3     No current [facility]-ordered facility-administered medications on file.     ROS:   See HPI.    Objective:     Exam: /60 (BP Location: Left arm, Patient Position: Sitting, BP Cuff Size: Adult)   Pulse 86   Temp 37.2 °C (98.9 °F) (Temporal)   Ht 1.623 m (5' 3.9\")   Wt 54.9 kg (121 lb)   SpO2 97%  Body mass index is 20.83 kg/m².    General: Normal appearing. No distress.  Abdomen: Nondistended.  Normoactive bowel sounds in all 4 quadrants.  No tenderness to deep palpation.  No HSM.  neurologic: Grossly nonfocal.  CN II through XII intact.  Skin: Warm and dry.  No obvious lesions.  Normal skin turgor.  Musculoskeletal: Normal gait. No extremity cyanosis, clubbing, or edema.  Psych: Normal mood and affect. Alert and oriented x3. Judgment and insight are normal.        Assessment & Plan:   68 y.o. female with the following -    Labs:   Complete metabolic panel is from 9/13/2023 to 9/15/2023 reviewed.    1. Colitis  2. Diarrhea due to malabsorption  3. MARIVEL (acute kidney injury) (HCC)  -Acute uncomplicated reaction to immunotherapy.  -Course of corticosteroids and IV resuscitation have provided rapid relief.  -Appropriate follow-up and medications have " already been arranged.  -Patient urged to keep up on her oral rehydration.    Return if symptoms worsen or fail to improve.    Please note that this dictation was created using voice recognition software. I have made every reasonable attempt to correct obvious errors, but I expect that there are errors of grammar and possibly content that I did not discover before finalizing the note.      Wander Johnson PA-C 9/19/2023

## 2025-01-04 NOTE — PROGRESS NOTES
"Progress Note - Behavioral Health   Name: Franco Roberson 39 y.o. male I MRN: [id number]  Unit/Bed#: -01 I Date of Admission: 5/14/2024   Date of Service: 1/4/2025 I Hospital Day: 235     Assessment & Plan  MDD (major depressive disorder), recurrent severe, without psychosis (HCC)  Patient continues to do well and the plan is to continue with the current treatment plan as mentioned below with no changes.    Patient continues to be pending group home placement. Otherwise no clinical significant change.  Pt and SW able to have meeting w/ [facility] on 12/13. Waiting for follow up from [facility] by mail.    Medication regimen as follows, no changes as of 1/4/2025:  Abilify 5 mg daily as mood adjunct   Zoloft 150 mg daily for depression and anxiety  Continue to encourage participation in group therapy, milieu therapy and occupational therapy.  Continue to assess for side effects of medications.  Continue collaboration with [facility] for medical co-morbidities as indicated.  Continue discussion with CM/SW to assist with obtaining collateral, disposition planning, and the implementation of patient-centered individualized plan of care.  Continue frequent safety checks and vitals per unit protocol.    Risks, benefits and possible side effects of Medications: Risks, benefits, and possible side effects of medications have previously been explained. No new medications at this time.      Legal status: 201    Disposition: to be determined, pending SOAR application for potential group home placement. OT Cognitive Evaluation completed: \"Pt would benefit from discharge to a supportive environment that can provide checks for safety and compliance with IADLs. \"   Although patient's mood has stabilized, they are currently awaiting group home placement.  Their ability to recognize safety hazards take medications and participate in IADLs are significantly impaired by their cognitive deficits compounded by their chronic mental health needs and would be " at risk for significant decompensation without the structure and support of a group home setting.      No associated orders from this encounter found during lookback period of 72 hours.  Autism spectrum disorder  Continue supportive care    No associated orders from this encounter found during lookback period of 72 hours.        Plan     Recommended Treatment:    - Encourage early mobility and having a structured day  - Provide frequent re-orientation, and cognitive stimulation  - Ensure assistive devices are in proper working order (eye-glasses, hearing aids)  - Encourage adequate hydration, nutrition and monitor bowel movements  - Maintain sleep-wake cycle: Uninterrupted sleep time; low-level lighting at night  - Fall precaution  - f/u [facility] recs regarding the medical problems   - Continue medication titration and treatment plan; adjust medication to optimize treatment response and as clinically indicated. .   - Observation: routine            - VS: as per unit protocol  - Diet: Regular diet  - Encourage group attendance and milieu therapy  - Dispo: To be determined     Scheduled medications:  Current Facility-Administered Medications   Medication Dose Route Frequency Provider Last Rate    acetaminophen  650 mg Oral Q6H PRN Basilio Brown MD      acetaminophen  650 mg Oral Q4H PRN Basilio Brown MD      acetaminophen  975 mg Oral Q6H PRN Basilio Brown MD      aluminum-magnesium hydroxide-simethicone  30 mL Oral Q4H PRN Basilio Brown MD      ARIPiprazole  5 mg Oral Daily Basilio Panda MD      Artificial Tears  1 drop Both Eyes Q3H PRN Basilio Brown MD      atorvastatin  10 mg Oral Daily With Dinner WALLY Baptiste      benztropine  1 mg Intramuscular Q4H PRN Max 6/day Basilio Brown MD      benztropine  1 mg Oral Q4H PRN Max 6/day Basilio Brown MD      Cholecalciferol  2,500 Units Oral Daily Dustin Mcallister MD      cyanocobalamin  1,000 mcg Oral Daily  WALLY Baptiste      Diclofenac Sodium  2 g Topical 4x Daily PRN WALLY Baptiste      hydrOXYzine HCL  50 mg Oral Q6H PRN Max 4/day Basilio Brown MD      Or    diphenhydrAMINE  50 mg Intramuscular Q6H PRN Basilio Brown MD      diphenhydrAMINE-zinc acetate   Topical Daily PRN Raj Guerrero MD      famotidine  20 mg Oral BID WALLY Baptiste      hydrOXYzine HCL  100 mg Oral Q6H PRN Max 4/day Basilio Brown MD      Or    LORazepam  2 mg Intramuscular Q6H PRN Basilio Brown MD      hydrOXYzine HCL  25 mg Oral Q6H PRN Max 4/day Basilio Brown MD      levothyroxine  37.5 mcg Oral Early Morning WALLY Baptiste      melatonin  6 mg Oral HS PRN WALLY Gomez      methocarbamol  500 mg Oral Q6H PRN WALLY Baptiste      OLANZapine  5 mg Oral Q4H PRN Max 3/day Basilio Brown MD      Or    OLANZapine  2.5 mg Intramuscular Q4H PRN Max 3/day Basilio Brown MD      OLANZapine  5 mg Oral Q3H PRN Max 3/day Basilio Brown MD      Or    OLANZapine  5 mg Intramuscular Q3H PRN Max 3/day Basilio Brown MD      OLANZapine  2.5 mg Oral Q4H PRN Max 6/day Basilio Brown MD      polyethylene glycol  17 g Oral Daily PRN Basilio Brown MD      propranolol  10 mg Oral Q8H PRN Basilio Brown MD      senna-docusate sodium  2 tablet Oral After Dinner Dustin Mcallister MD      sertraline  150 mg Oral Daily WALLY Gomez      traZODone  50 mg Oral HS PRN Basilio Brown MD        PRN:    acetaminophen    acetaminophen    acetaminophen    aluminum-magnesium hydroxide-simethicone    Artificial Tears    benztropine    benztropine    Diclofenac Sodium    hydrOXYzine HCL **OR** diphenhydrAMINE    diphenhydrAMINE-zinc acetate    hydrOXYzine HCL **OR** LORazepam    hydrOXYzine HCL    melatonin    methocarbamol    OLANZapine **OR** OLANZapine    OLANZapine **OR** OLANZapine    OLANZapine    polyethylene  "glycol    propranolol    traZODone       Subjective     Patient was visited on unit for continuing care; chart reviewed and discussed with multidisciplinary treatment team.  On approach, the patient was calm and cooperative. Denied any changes in mood, appetite, and energy level. No problem initiating and maintaining sleep.  Denied A/VH currently.  Denied SI/HI, intent or plan upon direct inquiry at this time.    Patient continues to be visible in the milieu and interacts with staff and peers. No reports of aggression or self-injurious behavior on unit. No PRN medications used in the past 24 hours.    Patient accepted all offered medications and no adverse effects of medications noted or reported.    Objective    Current Mental Status Evaluation:  Mental Status Exam  Appearance: casually dressed, consistent with stated age  Motor: no psychomotor retardation, no gait abnormalities  Behavior: cooperative, answers questions appropriately  Speech: soft, normal rhythm  Mood: \"good\"  Affect: constricted  Thought Process: linear and goal-oriented  Thought Content: denies delusions  Risk Potential: denies suicidal ideation, plan, or intent. Denies homicidal ideation  Perceptions: denies auditory hallucinations, denies visual hallucinations,   Sensorium: Oriented to person, place, time, and situation  Cognition: cognitive ability appears intact but was not quantitatively tested  Consciousness: alert and awake  Attention: intact, able to focus without difficulty  Insight: limited  Judgement: limited            Vital signs in last 24 hours:    Temp:  [97 °F (36.1 °C)] 97 °F (36.1 °C)  HR:  [65-74] 65  BP: (113-133)/(60-63) 113/63  Resp:  [16-18] 16  SpO2:  [96 %-97 %] 97 %  O2 Device: None (Room air)    Psychiatric Review of Systems:  Medication adverse effects: none  Sleep: unchanged  Appetite: unchanged  Behaviors over the past 24 hours: unchanged    Laboratory results:    I have personally reviewed all pertinent " laboratory/tests results  No results found for this or any previous visit (from the past 48 hours).       Progress Toward Goals & Illness Status:   progressing, mood is stabilizing, discharge planning, placement pending    Patient is not at goal. They are not yet ready for discharge. The patient's condition currently requires active psychopharmacological medication management, interdisciplinary coordination with case management, and the utilization of adjunctive milieu and group therapy to augment psychopharmacological efficacy. The patient's risk of morbidity, and progression or decompensation of psychiatric disease, is higher without this current treatment.     Next of Kin  Extended Emergency Contact Information  Primary Emergency Contact: Lois Roberson  Address: [address]           [address] [place] [address] [place]  Home Phone: [phone]  Relation: Mother    Counseling / Coordination of Care  Patient's progress discussed with staff in treatment team meeting.  Medications, treatment progress and treatment plan reviewed with patient.  Medication education provided to patient.  Educated on importance of medication and treatment compliance.  Supportive therapy provided to patient.  Cognitive techniques utilized during the session.  Reassurance and supportive therapy provided.  Reoriented to reality and reassured.  Encouraged participation in milieu and group therapy on the unit.  Crisis/safety plan discussed with patient.       Dustin Mcallister MD  Attending Psychiatrist   [facility]      This note has been constructed using a voice recognition system. There may be translation, syntax, or grammatical errors. If you have any questions, please contact the dictating provider.

## 2025-01-05 PROCEDURE — 99232 SBSQ HOSP IP/OBS MODERATE 35: CPT | Performed by: PSYCHIATRY & NEUROLOGY

## 2025-01-05 RX ADMIN — LEVOTHYROXINE SODIUM 37.5 MCG: 125 TABLET ORAL at 06:43

## 2025-01-05 RX ADMIN — CYANOCOBALAMIN TAB 1000 MCG 1000 MCG: 1000 TAB at 08:49

## 2025-01-05 RX ADMIN — FAMOTIDINE 20 MG: 20 TABLET ORAL at 08:49

## 2025-01-05 RX ADMIN — SERTRALINE HYDROCHLORIDE 150 MG: 100 TABLET ORAL at 08:50

## 2025-01-05 RX ADMIN — Medication 2500 UNITS: at 08:49

## 2025-01-05 RX ADMIN — SENNOSIDES AND DOCUSATE SODIUM 2 TABLET: 50; 8.6 TABLET ORAL at 17:29

## 2025-01-05 RX ADMIN — ARIPIPRAZOLE 5 MG: 5 TABLET ORAL at 08:49

## 2025-01-05 RX ADMIN — FAMOTIDINE 20 MG: 20 TABLET ORAL at 17:30

## 2025-01-05 RX ADMIN — ATORVASTATIN CALCIUM 10 MG: 10 TABLET, FILM COATED ORAL at 17:29

## 2025-01-05 NOTE — PLAN OF CARE
Problem: Depression  Goal: Treatment Goal: Demonstrate behavioral control of depressive symptoms, verbalize feelings of improved mood/affect, and adopt new coping skills prior to discharge  Outcome: Progressing  Goal: Verbalize thoughts and feelings  Description: Interventions:  - Assess and re-assess patient's level of risk   - Engage patient in 1:1 interactions, daily, for a minimum of 15 minutes   - Encourage patient to express feelings, fears, frustrations, hopes   Outcome: Progressing  Goal: Refrain from harming self  Description: Interventions:  - Monitor patient closely, per order   - Supervise medication ingestion, monitor effects and side effects   Outcome: Progressing  Goal: Refrain from isolation  Description: Interventions:  - Develop a trusting relationship   - Encourage socialization   Outcome: Progressing     Problem: Anxiety  Goal: Anxiety is at manageable level  Description: Interventions:  - Assess and monitor patient's anxiety level.   - Monitor for signs and symptoms (heart palpitations, chest pain, shortness of breath, headaches, nausea, feeling jumpy, restlessness, irritable, apprehensive).   - Collaborate with interdisciplinary team and initiate plan and interventions as ordered.  - [place] patient to unit/surroundings  - Explain treatment plan  - Encourage participation in care  - Encourage verbalization of concerns/fears  - Identify coping mechanisms  - Assist in developing anxiety-reducing skills  - Administer/offer alternative therapies  - Limit or eliminate stimulants  Outcome: Progressing     Problem: DISCHARGE PLANNING - CARE MANAGEMENT  Goal: Discharge to post-acute care or home with appropriate resources  Description: INTERVENTIONS:  - Conduct assessment to determine patient/family and health care team treatment goals, and need for post-acute services based on payer coverage, community resources, and patient preferences, and barriers to discharge  - Address psychosocial, clinical,  and financial barriers to discharge as identified in assessment in conjunction with the patient/family and health care team  - Arrange appropriate level of post-acute services according to patient’s   needs and preference and payer coverage in collaboration with the physician and health care team  - Communicate with and update the patient/family, physician, and health care team regarding progress on the discharge plan  - Arrange appropriate transportation to post-acute venues  Outcome: Progressing     Problem: Knowledge Deficit  Goal: Patient/family/caregiver demonstrates understanding of disease process, treatment plan, medications, and discharge instructions  Description: Complete learning assessment and assess knowledge base.  Interventions:  - Provide teaching at level of understanding  - Provide teaching via preferred learning methods  Outcome: Progressing     Problem: SLEEP DISTURBANCE  Goal: Will exhibit normal sleeping pattern  Description: Interventions:  -  Assess the patients sleep pattern, noting recent changes  - Administer medication as ordered  - Decrease environmental stimuli, including noise, as appropriate during the night  - Encourage the patient to actively participate in unit groups and or exercise during the day to enhance ability to achieve adequate sleep at night  - Assess the patient, in the morning, encouraging a description of sleep experience  Outcome: Progressing     Problem: Ineffective Coping  Goal: Participates in unit activities  Description: Interventions:  - Provide therapeutic environment   - Provide required programming   - Redirect inappropriate behaviors   Outcome: Not Progressing

## 2025-01-05 NOTE — PROGRESS NOTES
"Progress Note - Behavioral Health   Name: Franco Roberson 39 y.o. male I MRN: [id number]  Unit/Bed#: -01 I Date of Admission: 5/14/2024   Date of Service: 1/5/2025 I Hospital Day: 236     Assessment & Plan  MDD (major depressive disorder), recurrent severe, without psychosis (HCC)  Patient continues to do well and the plan is to continue with the current treatment plan as mentioned below with no changes.    Pt noted that he has a slight cough. VS wnl. Will cont to monitor for any further concerns and loop in medical as appropriate.    Patient continues to be pending group home placement. Otherwise no clinical significant change.  Pt and SW able to have meeting w/ [facility] on 12/13. Waiting for follow up from [facility] by mail.    Medication regimen as follows, no changes as of 1/5/2025:  Abilify 5 mg daily as mood adjunct   Zoloft 150 mg daily for depression and anxiety  Continue to encourage participation in group therapy, milieu therapy and occupational therapy.  Continue to assess for side effects of medications.  Continue collaboration with [facility]IM for medical co-morbidities as indicated.  Continue discussion with CM/SW to assist with obtaining collateral, disposition planning, and the implementation of patient-centered individualized plan of care.  Continue frequent safety checks and vitals per unit protocol.    Risks, benefits and possible side effects of Medications: Risks, benefits, and possible side effects of medications have previously been explained. No new medications at this time.      Legal status: 201    Disposition: to be determined, pending SOAR application for potential group home placement. OT Cognitive Evaluation completed: \"Pt would benefit from discharge to a supportive environment that can provide checks for safety and compliance with IADLs. \"   Although patient's mood has stabilized, they are currently awaiting group home placement.  Their ability to recognize safety hazards take medications and " participate in IADLs are significantly impaired by their cognitive deficits compounded by their chronic mental health needs and would be at risk for significant decompensation without the structure and support of a group home setting.      No associated orders from this encounter found during lookback period of 72 hours.  Autism spectrum disorder  Continue supportive care    No associated orders from this encounter found during lookback period of 72 hours.        Plan     Recommended Treatment:    - Encourage early mobility and having a structured day  - Provide frequent re-orientation, and cognitive stimulation  - Ensure assistive devices are in proper working order (eye-glasses, hearing aids)  - Encourage adequate hydration, nutrition and monitor bowel movements  - Maintain sleep-wake cycle: Uninterrupted sleep time; low-level lighting at night  - Fall precaution  - f/u [facility] recs regarding the medical problems   - Continue medication titration and treatment plan; adjust medication to optimize treatment response and as clinically indicated. .   - Observation: routine            - VS: as per unit protocol  - Diet: Regular diet  - Encourage group attendance and milieu therapy  - Dispo: To be determined     Scheduled medications:  Current Facility-Administered Medications   Medication Dose Route Frequency Provider Last Rate    acetaminophen  650 mg Oral Q6H PRN Basilio Brown MD      acetaminophen  650 mg Oral Q4H PRN Basilio Brown MD      acetaminophen  975 mg Oral Q6H PRN Basilio Brown MD      aluminum-magnesium hydroxide-simethicone  30 mL Oral Q4H PRN Basilio Brown MD      ARIPiprazole  5 mg Oral Daily Basilio Panda MD      Artificial Tears  1 drop Both Eyes Q3H PRN Basilio Brown MD      atorvastatin  10 mg Oral Daily With Dinner WALLY Baptiste      benztropine  1 mg Intramuscular Q4H PRN Max 6/day Basilio Brown MD      benztropine  1 mg Oral Q4H PRN Max  6/day Basilio Brown MD      Cholecalciferol  2,500 Units Oral Daily Dustin Mcallister MD      cyanocobalamin  1,000 mcg Oral Daily WALLY Baptiste      Diclofenac Sodium  2 g Topical 4x Daily PRN WALLY Baptiste      hydrOXYzine HCL  50 mg Oral Q6H PRN Max 4/day Basilio Brown MD      Or    diphenhydrAMINE  50 mg Intramuscular Q6H PRN Basilio Brown MD      diphenhydrAMINE-zinc acetate   Topical Daily PRN Raj Guerrero MD      famotidine  20 mg Oral BID WALLY Baptiste      hydrOXYzine HCL  100 mg Oral Q6H PRN Max 4/day Basilio Brown MD      Or    LORazepam  2 mg Intramuscular Q6H PRN Basilio Brown MD      hydrOXYzine HCL  25 mg Oral Q6H PRN Max 4/day aBsilio Brown MD      levothyroxine  37.5 mcg Oral Early Morning WALLY Baptiste      melatonin  6 mg Oral HS PRN WALLY Gomez      methocarbamol  500 mg Oral Q6H PRN WALLY Baptiste      OLANZapine  5 mg Oral Q4H PRN Max 3/day Basilio Brown MD      Or    OLANZapine  2.5 mg Intramuscular Q4H PRN Max 3/day Basilio Brown MD      OLANZapine  5 mg Oral Q3H PRN Max 3/day Basilio Brown MD      Or    OLANZapine  5 mg Intramuscular Q3H PRN Max 3/day Basilio Brown MD      OLANZapine  2.5 mg Oral Q4H PRN Max 6/day Basilio Brown MD      polyethylene glycol  17 g Oral Daily PRN Basilio Brown MD      propranolol  10 mg Oral Q8H PRN Basilio Brown MD      senna-docusate sodium  2 tablet Oral After Dinner Dustin Mcallister MD      sertraline  150 mg Oral Daily WALLY Gomez      traZODone  50 mg Oral HS PRN Basilio Brown MD        PRN:    acetaminophen    acetaminophen    acetaminophen    aluminum-magnesium hydroxide-simethicone    Artificial Tears    benztropine    benztropine    Diclofenac Sodium    hydrOXYzine HCL **OR** diphenhydrAMINE    diphenhydrAMINE-zinc acetate    hydrOXYzine HCL **OR** LORazepam     "hydrOXYzine HCL    melatonin    methocarbamol    OLANZapine **OR** OLANZapine    OLANZapine **OR** OLANZapine    OLANZapine    polyethylene glycol    propranolol    traZODone       Subjective     Patient was visited on unit for continuing care; chart reviewed and discussed with multidisciplinary treatment team.  On approach, the patient was calm and cooperative. Denied any changes in mood, appetite, and energy level. Notes that he has a slight cough but denies any other physiologic symptoms. VS are wnl.  No problem initiating and maintaining sleep.  Denied A/VH currently.  Denied SI/HI, intent or plan upon direct inquiry at this time.    Patient continues to be visible in the milieu and interacts with staff and peers. No reports of aggression or self-injurious behavior on unit. No PRN medications used in the past 24 hours.    Patient accepted all offered medications and no adverse effects of medications noted or reported.    Objective    Current Mental Status Evaluation:  Mental Status Exam  Appearance: casually dressed, consistent with stated age  Motor: no psychomotor retardation, no gait abnormalities  Behavior: cooperative, answers questions appropriately  Speech: soft, normal rhythm  Mood: \"good\"  Affect: constricted  Thought Process: linear and goal-oriented  Thought Content: denies delusions  Risk Potential: denies suicidal ideation, plan, or intent. Denies homicidal ideation  Perceptions: denies auditory hallucinations, denies visual hallucinations,   Sensorium: Oriented to person, place, time, and situation  Cognition: cognitive ability appears intact but was not quantitatively tested  Consciousness: alert and awake  Attention: intact, able to focus without difficulty  Insight: limited  Judgement: limited            Vital signs in last 24 hours:    Temp:  [97.3 °F (36.3 °C)-98.6 °F (37 °C)] 98.6 °F (37 °C)  HR:  [73-78] 78  BP: (102-110)/(62-66) 110/66  Resp:  [16] 16  SpO2:  [96 %-97 %] 97 %  O2 Device: " None (Room air)    Psychiatric Review of Systems:  Medication adverse effects: none  Sleep: unchanged  Appetite: unchanged  Behaviors over the past 24 hours: unchanged    Laboratory results:    I have personally reviewed all pertinent laboratory/tests results  No results found for this or any previous visit (from the past 48 hours).       Progress Toward Goals & Illness Status:   progressing, discharge planning, placement pending    Patient is not at goal. They are not yet ready for discharge. The patient's condition currently requires active psychopharmacological medication management, interdisciplinary coordination with case management, and the utilization of adjunctive milieu and group therapy to augment psychopharmacological efficacy. The patient's risk of morbidity, and progression or decompensation of psychiatric disease, is higher without this current treatment.     Next of Kin  Extended Emergency Contact Information  Primary Emergency Contact: Lois Roberson  Address: [address] [place] [address]  Home Phone: [phone]  Relation: Mother    Counseling / Coordination of Care  Patient's progress discussed with staff in treatment team meeting.  Medications, treatment progress and treatment plan reviewed with patient.  Medication education provided to patient.  Educated on importance of medication and treatment compliance.  Supportive therapy provided to patient.  Cognitive techniques utilized during the session.  Reassurance and supportive therapy provided.  Reoriented to reality and reassured.  Encouraged participation in milieu and group therapy on the unit.  Crisis/safety plan discussed with patient.       Dustin Mcallister MD  Attending Psychiatrist   [facility]      This note has been constructed using a voice recognition system. There may be translation, syntax, or grammatical errors. If you have any questions, please contact  the dictating provider.

## 2025-01-05 NOTE — NURSING NOTE
Patient has been visible in the milieu before snack but in his room since. He and his roommate seem to be getting along well together - they can be heard in the room quietly chatting back and forth. He is awaiting placement. He is as usual pleasant and agreeable.  He denies S.I.H.I.A/H V/H

## 2025-01-05 NOTE — ASSESSMENT & PLAN NOTE
"Patient continues to do well and the plan is to continue with the current treatment plan as mentioned below with no changes.    Pt noted that he has a slight cough. VS wnl. Will cont to monitor for any further concerns and loop in medical as appropriate.    Patient continues to be pending group home placement. Otherwise no clinical significant change.  Pt and SW able to have meeting w/ [facility] on 12/13. Waiting for follow up from [facility] by mail.    Medication regimen as follows, no changes as of 1/5/2025:  Abilify 5 mg daily as mood adjunct   Zoloft 150 mg daily for depression and anxiety  Continue to encourage participation in group therapy, milieu therapy and occupational therapy.  Continue to assess for side effects of medications.  Continue collaboration with [facility] for medical co-morbidities as indicated.  Continue discussion with CM/SW to assist with obtaining collateral, disposition planning, and the implementation of patient-centered individualized plan of care.  Continue frequent safety checks and vitals per unit protocol.    Risks, benefits and possible side effects of Medications: Risks, benefits, and possible side effects of medications have previously been explained. No new medications at this time.      Legal status: 201    Disposition: to be determined, pending SOAR application for potential group home placement. OT Cognitive Evaluation completed: \"Pt would benefit from discharge to a supportive environment that can provide checks for safety and compliance with IADLs. \"   Although patient's mood has stabilized, they are currently awaiting group home placement.  Their ability to recognize safety hazards take medications and participate in IADLs are significantly impaired by their cognitive deficits compounded by their chronic mental health needs and would be at risk for significant decompensation without the structure and support of a group home setting.      No associated orders from this encounter found " during lookback period of 72 hours.

## 2025-01-05 NOTE — NURSING NOTE
Patient denies SI, HI, AHs and VHs. Denies anxiety and depression. Patient is medication and meal compliant. Withdrawn to his room, occasional social with room mate. He denies any needs at this time.

## 2025-01-06 PROCEDURE — 99232 SBSQ HOSP IP/OBS MODERATE 35: CPT | Performed by: PSYCHIATRY & NEUROLOGY

## 2025-01-06 RX ADMIN — SERTRALINE HYDROCHLORIDE 150 MG: 100 TABLET ORAL at 08:27

## 2025-01-06 RX ADMIN — CYANOCOBALAMIN TAB 1000 MCG 1000 MCG: 1000 TAB at 08:27

## 2025-01-06 RX ADMIN — Medication 2500 UNITS: at 08:27

## 2025-01-06 RX ADMIN — LEVOTHYROXINE SODIUM 37.5 MCG: 125 TABLET ORAL at 06:10

## 2025-01-06 RX ADMIN — FAMOTIDINE 20 MG: 20 TABLET ORAL at 08:27

## 2025-01-06 RX ADMIN — ARIPIPRAZOLE 5 MG: 5 TABLET ORAL at 08:27

## 2025-01-06 RX ADMIN — FAMOTIDINE 20 MG: 20 TABLET ORAL at 17:15

## 2025-01-06 RX ADMIN — ATORVASTATIN CALCIUM 10 MG: 10 TABLET, FILM COATED ORAL at 17:15

## 2025-01-06 RX ADMIN — SENNOSIDES AND DOCUSATE SODIUM 2 TABLET: 50; 8.6 TABLET ORAL at 17:15

## 2025-01-06 NOTE — PLAN OF CARE
Problem: Depression  Goal: Treatment Goal: Demonstrate behavioral control of depressive symptoms, verbalize feelings of improved mood/affect, and adopt new coping skills prior to discharge  Outcome: Progressing  Goal: Verbalize thoughts and feelings  Description: Interventions:  - Assess and re-assess patient's level of risk   - Engage patient in 1:1 interactions, daily, for a minimum of 15 minutes   - Encourage patient to express feelings, fears, frustrations, hopes   Outcome: Progressing  Goal: Refrain from harming self  Description: Interventions:  - Monitor patient closely, per order   - Supervise medication ingestion, monitor effects and side effects   Outcome: Progressing  Goal: Refrain from isolation  Description: Interventions:  - Develop a trusting relationship   - Encourage socialization   Outcome: Progressing     Problem: Anxiety  Goal: Anxiety is at manageable level  Description: Interventions:  - Assess and monitor patient's anxiety level.   - Monitor for signs and symptoms (heart palpitations, chest pain, shortness of breath, headaches, nausea, feeling jumpy, restlessness, irritable, apprehensive).   - Collaborate with interdisciplinary team and initiate plan and interventions as ordered.  - [place] patient to unit/surroundings  - Explain treatment plan  - Encourage participation in care  - Encourage verbalization of concerns/fears  - Identify coping mechanisms  - Assist in developing anxiety-reducing skills  - Administer/offer alternative therapies  - Limit or eliminate stimulants  Outcome: Progressing     Problem: DISCHARGE PLANNING - CARE MANAGEMENT  Goal: Discharge to post-acute care or home with appropriate resources  Description: INTERVENTIONS:  - Conduct assessment to determine patient/family and health care team treatment goals, and need for post-acute services based on payer coverage, community resources, and patient preferences, and barriers to discharge  - Address psychosocial, clinical,  and financial barriers to discharge as identified in assessment in conjunction with the patient/family and health care team  - Arrange appropriate level of post-acute services according to patient’s   needs and preference and payer coverage in collaboration with the physician and health care team  - Communicate with and update the patient/family, physician, and health care team regarding progress on the discharge plan  - Arrange appropriate transportation to post-acute venues  Outcome: Progressing     Problem: Knowledge Deficit  Goal: Patient/family/caregiver demonstrates understanding of disease process, treatment plan, medications, and discharge instructions  Description: Complete learning assessment and assess knowledge base.  Interventions:  - Provide teaching at level of understanding  - Provide teaching via preferred learning methods  Outcome: Progressing     Problem: SLEEP DISTURBANCE  Goal: Will exhibit normal sleeping pattern  Description: Interventions:  -  Assess the patients sleep pattern, noting recent changes  - Administer medication as ordered  - Decrease environmental stimuli, including noise, as appropriate during the night  - Encourage the patient to actively participate in unit groups and or exercise during the day to enhance ability to achieve adequate sleep at night  - Assess the patient, in the morning, encouraging a description of sleep experience  Outcome: Progressing     Problem: Ineffective Coping  Goal: Participates in unit activities  Description: Interventions:  - Provide therapeutic environment   - Provide required programming   - Redirect inappropriate behaviors   Outcome: Not Progressing

## 2025-01-06 NOTE — NURSING NOTE
Patient has been seclusive to his room. C/o a scratchy throat and was given cough drops per patient request. Denies psychiatric symptoms. Waiting placement.

## 2025-01-06 NOTE — ASSESSMENT & PLAN NOTE
"Patient continues to do well and the plan is to continue with the current treatment plan as mentioned below with no changes.    Pt noted that he has a slight cough. VS wnl. Will cont to monitor for any further concerns and loop in medical as appropriate.    Patient continues to be pending group home placement. Otherwise no clinical significant change.  Pt and SW able to have meeting w/ [facility] on 12/13. Waiting for follow up from [facility] by mail.    Medication regimen as follows, no changes as of 1/5/2025:  Abilify 5 mg daily as mood adjunct   Zoloft 150 mg daily for depression and anxiety  Continue to encourage participation in group therapy, milieu therapy and occupational therapy.  Continue to assess for side effects of medications.  Continue collaboration with [facility] for medical co-morbidities as indicated.  Continue discussion with CM/SW to assist with obtaining collateral, disposition planning, and the implementation of patient-centered individualized plan of care.  Continue frequent safety checks and vitals per unit protocol.    Risks, benefits and possible side effects of Medications: Risks, benefits, and possible side effects of medications have previously been explained. No new medications at this time.      Legal status: 201    Disposition: to be determined, pending SOAR application for potential group home placement. OT Cognitive Evaluation completed: \"Pt would benefit from discharge to a supportive environment that can provide checks for safety and compliance with IADLs. \"   Although patient's mood has stabilized, they are currently awaiting group home placement.  Their ability to recognize safety hazards take medications and participate in IADLs are significantly impaired by their cognitive deficits compounded by their chronic mental health needs and would be at risk for significant decompensation without the structure and support of a group home setting.      No associated orders from this encounter found " during lookback period of 72 hours.

## 2025-01-06 NOTE — NURSING NOTE
Patient was visible for a short while this evening. He said little but he did come for snack. He denies S.I.H.I.A/H V/H

## 2025-01-06 NOTE — PROGRESS NOTES
"Progress Note - Behavioral Health   Name: Franco Roberson 39 y.o. male I MRN: [id number]   Unit/Bed#: -01 I Date of Admission: 5/14/2024   Date of Service: 1/6/2025 I Hospital Day: 237         Assessment & Plan  MDD (major depressive disorder), recurrent severe, without psychosis (HCC)  Patient continues to do well and the plan is to continue with the current treatment plan as mentioned below with no changes.    Pt noted that he has a slight cough. VS wnl. Will cont to monitor for any further concerns and loop in medical as appropriate.    Patient continues to be pending group home placement. Otherwise no clinical significant change.  Pt and SW able to have meeting w/ [facility] on 12/13. Waiting for follow up from [facility] by mail.    Medication regimen as follows, no changes as of 1/5/2025:  Abilify 5 mg daily as mood adjunct   Zoloft 150 mg daily for depression and anxiety  Continue to encourage participation in group therapy, milieu therapy and occupational therapy.  Continue to assess for side effects of medications.  Continue collaboration with [facility] for medical co-morbidities as indicated.  Continue discussion with CM/SW to assist with obtaining collateral, disposition planning, and the implementation of patient-centered individualized plan of care.  Continue frequent safety checks and vitals per unit protocol.    Risks, benefits and possible side effects of Medications: Risks, benefits, and possible side effects of medications have previously been explained. No new medications at this time.      Legal status: 201    Disposition: to be determined, pending SOAR application for potential group home placement. OT Cognitive Evaluation completed: \"Pt would benefit from discharge to a supportive environment that can provide checks for safety and compliance with IADLs. \"   Although patient's mood has stabilized, they are currently awaiting group home placement.  Their ability to recognize safety hazards take medications and " participate in IADLs are significantly impaired by their cognitive deficits compounded by their chronic mental health needs and would be at risk for significant decompensation without the structure and support of a group home setting.      No associated orders from this encounter found during lookback period of 72 hours.  Autism spectrum disorder  Continue supportive care    No associated orders from this encounter found during lookback period of 72 hours.          Recommended Treatment:     Treatment plan and medication changes discussed and per the attending physician the plan is:     1.Continue with group therapy, milieu therapy and occupational therapy  2.Behavioral Health checks every 15 minutes  3.Continue frequent safety checks and vitals per unit protocol  4.Continue with [facility] medical management as indicated  5.Continue with current medication regimen  6.Will review labs in the a.m.  7.Disposition Planning: Discharge planning and efforts remain ongoing     Planned medication and treatment changes:    All current active medications have been reviewed  Encourage group therapy, milieu therapy and occupational therapy  Behavioral Health checks for safety monitoring  Continue treatment with group therapy, milieu therapy and occupational therapy  Discharge planning  This patient is stable and ready for discharge, however, even at baseline, patient continues with poor insight, judgement and coping that pose a risk to patient in a homeless shelter, or otherwise unsupervised setting. Case management is assertively/actively working on securing the necessary level of care.  Continue current medications:    Current medications:  Current Facility-Administered Medications   Medication Dose Route Frequency Provider Last Rate    acetaminophen  650 mg Oral Q6H PRN Basilio Brown MD      acetaminophen  650 mg Oral Q4H PRN Basilio Brown MD      acetaminophen  975 mg Oral Q6H PRN Basilio Brown MD       aluminum-magnesium hydroxide-simethicone  30 mL Oral Q4H PRN Basilio Brown MD      ARIPiprazole  5 mg Oral Daily Basilio Panda MD      Artificial Tears  1 drop Both Eyes Q3H PRN Basilio Brown MD      atorvastatin  10 mg Oral Daily With Dinner WALLY Baptiste      benztropine  1 mg Intramuscular Q4H PRN Max 6/day Basilio Brown MD      benztropine  1 mg Oral Q4H PRN Max 6/day Basilio Brown MD      Cholecalciferol  2,500 Units Oral Daily Dustin Mcallister MD      cyanocobalamin  1,000 mcg Oral Daily WALLY Baptiste      Diclofenac Sodium  2 g Topical 4x Daily PRN WALLY Baptiste      hydrOXYzine HCL  50 mg Oral Q6H PRN Max 4/day Basilio Brown MD      Or    diphenhydrAMINE  50 mg Intramuscular Q6H PRN Basilio Brown MD      diphenhydrAMINE-zinc acetate   Topical Daily PRN Raj Guerrero MD      famotidine  20 mg Oral BID WALLY Baptiste      hydrOXYzine HCL  100 mg Oral Q6H PRN Max 4/day Basilio Brown MD      Or    LORazepam  2 mg Intramuscular Q6H PRN Basilio Brown MD      hydrOXYzine HCL  25 mg Oral Q6H PRN Max 4/day Basilio Brown MD      levothyroxine  37.5 mcg Oral Early Morning WALLY Baptiste      melatonin  6 mg Oral HS PRN WALLY Gomez      methocarbamol  500 mg Oral Q6H PRN WALLY Baptiste      OLANZapine  5 mg Oral Q4H PRN Max 3/day Basilio Brown MD      Or    OLANZapine  2.5 mg Intramuscular Q4H PRN Max 3/day Basilio Brown MD      OLANZapine  5 mg Oral Q3H PRN Max 3/day Basilio Brown MD      Or    OLANZapine  5 mg Intramuscular Q3H PRN Max 3/day Basilio Brown MD      OLANZapine  2.5 mg Oral Q4H PRN Max 6/day Basilio Brown MD      polyethylene glycol  17 g Oral Daily PRN Basilio Brown MD      propranolol  10 mg Oral Q8H PRN Basilio Brown MD      senna-docusate sodium  2 tablet Oral After Dinner Dustin Mcallister MD       sertraline  150 mg Oral Daily WALLY Gomez      traZODone  50 mg Oral HS PRN Basilio Brown MD         Risks / Benefits of Treatment:    Risks, benefits, and possible side effects of medications explained to patient and patient verbalizes understanding and agreement for treatment.    Subjective:    Behavior over the last 24 hours: unchanged.     Per staff, Franco continues to be calm and cooperative on the unit.  He is meal and medication compliant.   He is reporting a mild sore throat and some coughing today.    Franco was seen in the hallway away from peers today for psychiatric follow-up.  He presents as disheveled with an unkempt beard . He is calm and cooperative with the interview today.  He reports he is not feeling well and has a sore throat and a mild cough.  He is currently denying any body aches and he has no fever.  He reports that his depression and anxiety are manageable.  He denies any side effects from his current medication regimen.  He adamantly denies any suicidal or homicidal ideation intent or plan today.  He denies any auditory or visual hallucinations and does not appear to be internally stimulated.  He reports he is planning to grow out his beard until he is finally discharged.  He remains hopeful for discharge in the near future.  Franco has reached stabilization however if he would be discharged to an unstructured environment would likely decompensate quickly requiring further hospitalization.    Sleep: normal  Appetite: normal  Medication side effects: No   ROS:  Sore throat mild cough    Mental Status Evaluation:    Appearance:  disheveled, bearded   Behavior:  cooperative, calm   Speech:  normal rate and volume   Mood:  euthymic   Affect:  constricted   Thought Process:  goal directed, linear   Associations: intact associations   Thought Content:  no overt delusions   Perceptual Disturbances: none   Risk Potential: Suicidal ideation - None  Homicidal ideation -  None  Potential for aggression - No   Sensorium:  oriented to person, place, and time/date   Memory:  recent memory intact   Consciousness:  alert and awake   Attention/Concentration: attention span and concentration are age appropriate   Insight:  limited   Judgment: limited   Gait/Station: normal gait/station   Motor Activity: no abnormal movements     Vital signs in last 24 hours:    Temp:  [98.1 °F (36.7 °C)-98.6 °F (37 °C)] 98.1 °F (36.7 °C)  HR:  [93-95] 93  BP: (110-126)/(62-64) 110/62  Resp:  [16] 16  SpO2:  [96 %-97 %] 96 %  O2 Device: None (Room air)         Laboratory results: I have personally reviewed all pertinent laboratory/tests results    Results from the past 24 hours: No results found for this or any previous visit (from the past 24 hours).  Most Recent Labs:   Lab Results   Component Value Date    WBC 6.75 11/12/2024    RBC 5.06 11/12/2024    HGB 15.9 11/12/2024    HCT 46.2 11/12/2024     11/12/2024    RDW 12.8 11/12/2024    NEUTROABS 4.15 11/12/2024    SODIUM 139 11/12/2024    K 4.0 11/12/2024     11/12/2024    CO2 30 11/12/2024    BUN 17 11/12/2024    CREATININE 0.99 11/12/2024    GLUC 85 11/12/2024    CALCIUM 9.4 11/12/2024    AST 23 11/12/2024    ALT 32 11/12/2024    ALKPHOS 73 11/12/2024    TP 8.1 11/12/2024    ALB 4.4 11/12/2024    TBILI 0.54 11/12/2024    CHOLESTEROL 151 11/26/2024    HDL 39 (L) 11/26/2024    TRIG 170 (H) 11/26/2024    LDLCALC 78 11/26/2024    NONHDLC 112 11/26/2024    WQX3WESWINYA 2.532 11/12/2024    FREET4 0.56 (L) 09/10/2024    SYPHILISAB Non-reactive 05/15/2024       Suicide/Homicide Risk Assessment:    Risk of Harm to Self:   Nursing Suicide Risk Assessment Last 24 hours: C-SSRS Risk (Since Last Contact)  Calculated C-SSRS Risk Score (Since Last Contact): No Risk Indicated  Current Specific Risk Factors include: diagnosis of mood disorder  Protective Factors: no current suicidal ideation, ability to communicate with staff on the unit, able to contract for  safety on the unit, taking medications as ordered on the unit, ability to adapt to change, responds to redirection  Based on today's assessment, Franco presents the following risk of harm to self: low    Risk of Harm to Others:  Nursing Homicide Risk Assessment: Violence Risk to Others: Denies within past 6 months  Current Specific Risk Factors include: social difficulties  Protective Factors: no current homicidal ideation, improved mood, compliant with medications on the unit as ordered, compliant with unit milieu, follows staff redirection  Based on today's assessment, Franco presents the following risk of harm to others: low    The following interventions are recommended: Behavioral Health checks for safety monitoring, continued hospitalization on locked unit    Progress Toward Goals: progressing, participates in milieu therapy, depression is improving, placement pending group home    Counseling / Coordination of Care:    Total floor / unit time spent today 30 minutes. Greater than 50% of total time was spent with the patient and / or family counseling and / or coordination of care. A description of counseling / coordination of care:    Administrative Statements   I have spent a total time of 30 minutes in caring for this patient on the day of the visit/encounter including Diagnostic results, Documenting in the medical record, Reviewing / ordering tests, medicine, procedures  , Obtaining or reviewing history  , and Communicating with other healthcare professionals .    WALLY Gomez 01/06/25

## 2025-01-06 NOTE — PROGRESS NOTES
01/06/25 0842   Team Meeting   Meeting Type Daily Rounds   Team Members Present   Team Members Present Physician;;Nurse   Physician Team Member Ary   [facility]ing Team Member [facility] Team Member Noa   Patient/Family Present   Patient Present No   Patient's Family Present No     Pt is medication and meal compliant. Pt denies SI/HI/AVH. Pt is calm and cooperative. Pt's discharge is pending placement.

## 2025-01-07 PROCEDURE — 99232 SBSQ HOSP IP/OBS MODERATE 35: CPT

## 2025-01-07 RX ADMIN — SERTRALINE HYDROCHLORIDE 150 MG: 100 TABLET ORAL at 08:21

## 2025-01-07 RX ADMIN — ARIPIPRAZOLE 5 MG: 5 TABLET ORAL at 08:21

## 2025-01-07 RX ADMIN — ATORVASTATIN CALCIUM 10 MG: 10 TABLET, FILM COATED ORAL at 17:19

## 2025-01-07 RX ADMIN — Medication 2500 UNITS: at 08:21

## 2025-01-07 RX ADMIN — FAMOTIDINE 20 MG: 20 TABLET ORAL at 17:19

## 2025-01-07 RX ADMIN — CYANOCOBALAMIN TAB 1000 MCG 1000 MCG: 1000 TAB at 08:21

## 2025-01-07 RX ADMIN — SENNOSIDES AND DOCUSATE SODIUM 2 TABLET: 50; 8.6 TABLET ORAL at 17:18

## 2025-01-07 RX ADMIN — LEVOTHYROXINE SODIUM 37.5 MCG: 125 TABLET ORAL at 06:17

## 2025-01-07 RX ADMIN — FAMOTIDINE 20 MG: 20 TABLET ORAL at 08:21

## 2025-01-07 NOTE — PROGRESS NOTES
01/07/25 0839   Team Meeting   Meeting Type Daily Rounds   Team Members Present   Team Members Present Physician;;Nurse   Physician Team Member Ary   [facility]   Care Management Team Member Noa   Patient/Family Present   Patient Present No   Patient's Family Present No     Pt requested cough drops for a scratchy throat, Pt also received a cup of tea. Pt denies SI/HI/AVH. Pt is medication and meal compliant. Pt's discharge is pending for placement.

## 2025-01-07 NOTE — ASSESSMENT & PLAN NOTE
"Patient continues to do well and the plan is to continue with the current treatment plan as mentioned below with no changes.    Pt noted that he has a slight cough and sore throat, medical made aware    Patient continues to be pending group home placement. Otherwise no clinical significant change.  Pt and SW able to have meeting w/ [facility] on 12/13. Waiting for follow up from [facility] by mail.    Medication regimen as follows, no changes as of 1/7/2025:  Abilify 5 mg daily as mood adjunct   Zoloft 150 mg daily for depression and anxiety  Continue to encourage participation in group therapy, milieu therapy and occupational therapy.  Continue to assess for side effects of medications.  Continue collaboration with [facility] for medical co-morbidities as indicated.  Continue discussion with CM/SW to assist with obtaining collateral, disposition planning, and the implementation of patient-centered individualized plan of care.  Continue frequent safety checks and vitals per unit protocol.    Risks, benefits and possible side effects of Medications: Risks, benefits, and possible side effects of medications have previously been explained. No new medications at this time.      Legal status: 201    Disposition: to be determined, pending SOAR application for potential group home placement. OT Cognitive Evaluation completed: \"Pt would benefit from discharge to a supportive environment that can provide checks for safety and compliance with IADLs. \"   Although patient's mood has stabilized, they are currently awaiting group home placement.  Their ability to recognize safety hazards take medications and participate in IADLs are significantly impaired by their cognitive deficits compounded by their chronic mental health needs and would be at risk for significant decompensation without the structure and support of a group home setting.      No associated orders from this encounter found during lookback period of 72 hours.  "

## 2025-01-07 NOTE — PLAN OF CARE
Problem: Ineffective Coping  Goal: Participates in unit activities  Description: Interventions:  - Provide therapeutic environment   - Provide required programming   - Redirect inappropriate behaviors   Outcome: Progressing     Problem: Depression  Goal: Treatment Goal: Demonstrate behavioral control of depressive symptoms, verbalize feelings of improved mood/affect, and adopt new coping skills prior to discharge  Outcome: Progressing  Goal: Verbalize thoughts and feelings  Description: Interventions:  - Assess and re-assess patient's level of risk   - Engage patient in 1:1 interactions, daily, for a minimum of 15 minutes   - Encourage patient to express feelings, fears, frustrations, hopes   Outcome: Progressing  Goal: Refrain from harming self  Description: Interventions:  - Monitor patient closely, per order   - Supervise medication ingestion, monitor effects and side effects   Outcome: Progressing  Goal: Refrain from isolation  Description: Interventions:  - Develop a trusting relationship   - Encourage socialization   Outcome: Progressing     Problem: Anxiety  Goal: Anxiety is at manageable level  Description: Interventions:  - Assess and monitor patient's anxiety level.   - Monitor for signs and symptoms (heart palpitations, chest pain, shortness of breath, headaches, nausea, feeling jumpy, restlessness, irritable, apprehensive).   - Collaborate with interdisciplinary team and initiate plan and interventions as ordered.  - [place] patient to unit/surroundings  - Explain treatment plan  - Encourage participation in care  - Encourage verbalization of concerns/fears  - Identify coping mechanisms  - Assist in developing anxiety-reducing skills  - Administer/offer alternative therapies  - Limit or eliminate stimulants  Outcome: Progressing     Problem: DISCHARGE PLANNING - CARE MANAGEMENT  Goal: Discharge to post-acute care or home with appropriate resources  Description: INTERVENTIONS:  - Conduct assessment to  determine patient/family and health care team treatment goals, and need for post-acute services based on payer coverage, community resources, and patient preferences, and barriers to discharge  - Address psychosocial, clinical, and financial barriers to discharge as identified in assessment in conjunction with the patient/family and health care team  - Arrange appropriate level of post-acute services according to patient’s   needs and preference and payer coverage in collaboration with the physician and health care team  - Communicate with and update the patient/family, physician, and health care team regarding progress on the discharge plan  - Arrange appropriate transportation to post-acute venues  Outcome: Progressing     Problem: Knowledge Deficit  Goal: Patient/family/caregiver demonstrates understanding of disease process, treatment plan, medications, and discharge instructions  Description: Complete learning assessment and assess knowledge base.  Interventions:  - Provide teaching at level of understanding  - Provide teaching via preferred learning methods  Outcome: Progressing     Problem: SLEEP DISTURBANCE  Goal: Will exhibit normal sleeping pattern  Description: Interventions:  -  Assess the patients sleep pattern, noting recent changes  - Administer medication as ordered  - Decrease environmental stimuli, including noise, as appropriate during the night  - Encourage the patient to actively participate in unit groups and or exercise during the day to enhance ability to achieve adequate sleep at night  - Assess the patient, in the morning, encouraging a description of sleep experience  Outcome: Progressing

## 2025-01-07 NOTE — PROGRESS NOTES
"Progress Note - Behavioral Health   Name: Franco Roberson 39 y.o. male I MRN: [id number]   Unit/Bed#: -01 I Date of Admission: 5/14/2024   Date of Service: 1/7/2025 I Hospital Day: 238         Assessment & Plan  MDD (major depressive disorder), recurrent severe, without psychosis (HCC)  Patient continues to do well and the plan is to continue with the current treatment plan as mentioned below with no changes.    Pt noted that he has a slight cough and sore throat, medical made aware    Patient continues to be pending group home placement. Otherwise no clinical significant change.  Pt and SW able to have meeting w/ [facility] on 12/13. Waiting for follow up from [facility] by mail.    Medication regimen as follows, no changes as of 1/7/2025:  Abilify 5 mg daily as mood adjunct   Zoloft 150 mg daily for depression and anxiety  Continue to encourage participation in group therapy, milieu therapy and occupational therapy.  Continue to assess for side effects of medications.  Continue collaboration with [facility] for medical co-morbidities as indicated.  Continue discussion with CM/SW to assist with obtaining collateral, disposition planning, and the implementation of patient-centered individualized plan of care.  Continue frequent safety checks and vitals per unit protocol.    Risks, benefits and possible side effects of Medications: Risks, benefits, and possible side effects of medications have previously been explained. No new medications at this time.      Legal status: 201    Disposition: to be determined, pending SOAR application for potential group home placement. OT Cognitive Evaluation completed: \"Pt would benefit from discharge to a supportive environment that can provide checks for safety and compliance with IADLs. \"   Although patient's mood has stabilized, they are currently awaiting group home placement.  Their ability to recognize safety hazards take medications and participate in IADLs are significantly impaired by their " cognitive deficits compounded by their chronic mental health needs and would be at risk for significant decompensation without the structure and support of a group home setting.      No associated orders from this encounter found during lookback period of 72 hours.  Autism spectrum disorder  Continue supportive care    No associated orders from this encounter found during lookback period of 72 hours.          Recommended Treatment:     Treatment plan and medication changes discussed and per the attending physician the plan is:     1.Continue with group therapy, milieu therapy and occupational therapy  2.Behavioral Health checks every 15 minutes  3.Continue frequent safety checks and vitals per unit protocol  4.Continue with [facility] medical management as indicated  5.Continue with current medication regimen  6.Will review labs in the a.m.  7.Disposition Planning: Discharge planning and efforts remain ongoing     Planned medication and treatment changes:    All current active medications have been reviewed  Encourage group therapy, milieu therapy and occupational therapy  Behavioral Health checks for safety monitoring  Continue treatment with group therapy, milieu therapy and occupational therapy  This patient is stable and ready for discharge, however, even at baseline, patient continues with poor insight, judgement and coping that pose a risk to patient in a homeless shelter, or otherwise unsupervised setting. Case management is assertively/actively working on securing the necessary level of care.  Continue current medications:    Current medications:  Current Facility-Administered Medications   Medication Dose Route Frequency Provider Last Rate    acetaminophen  650 mg Oral Q6H PRN Basilio Brown MD      acetaminophen  650 mg Oral Q4H PRN Basilio Brown MD      acetaminophen  975 mg Oral Q6H PRN Basilio Brown MD      aluminum-magnesium hydroxide-simethicone  30 mL Oral Q4H PRN Basilio Brown MD       ARIPiprazole  5 mg Oral Daily Basilio Panda MD      Artificial Tears  1 drop Both Eyes Q3H PRN Basilio Brown MD      atorvastatin  10 mg Oral Daily With Dinner WALLY Baptiste      benztropine  1 mg Intramuscular Q4H PRN Max 6/day Basilio Brown MD      benztropine  1 mg Oral Q4H PRN Max 6/day Basilio Brown MD      Cholecalciferol  2,500 Units Oral Daily Dustin Mcallister MD      cyanocobalamin  1,000 mcg Oral Daily WALLY Baptiste      Diclofenac Sodium  2 g Topical 4x Daily PRN WALLY Baptiste      hydrOXYzine HCL  50 mg Oral Q6H PRN Max 4/day Basilio Brown MD      Or    diphenhydrAMINE  50 mg Intramuscular Q6H PRN Basilio Brown MD      diphenhydrAMINE-zinc acetate   Topical Daily PRN Raj Guerrero MD      famotidine  20 mg Oral BID WALLY Baptiste      hydrOXYzine HCL  100 mg Oral Q6H PRN Max 4/day Basilio Brown MD      Or    LORazepam  2 mg Intramuscular Q6H PRN Basilio Brown MD      hydrOXYzine HCL  25 mg Oral Q6H PRN Max 4/day Basilio Brown MD      levothyroxine  37.5 mcg Oral Early Morning WALLY Baptiste      melatonin  6 mg Oral HS PRN WALLY Gomez      methocarbamol  500 mg Oral Q6H PRN WALLY Baptiste      OLANZapine  5 mg Oral Q4H PRN Max 3/day Basilio Brown MD      Or    OLANZapine  2.5 mg Intramuscular Q4H PRN Max 3/day Basilio Brown MD      OLANZapine  5 mg Oral Q3H PRN Max 3/day Basilio Brown MD      Or    OLANZapine  5 mg Intramuscular Q3H PRN Max 3/day Basilio Brown MD      OLANZapine  2.5 mg Oral Q4H PRN Max 6/day Basilio Brown MD      polyethylene glycol  17 g Oral Daily PRN Basilio Brown MD      propranolol  10 mg Oral Q8H PRN Basilio Brown MD      senna-docusate sodium  2 tablet Oral After Dinner Dustin Mcallister MD      sertraline  150 mg Oral Daily WALLY Gomez      traZODone  50 mg Oral HS PRN  "Basilio Brown MD         Risks / Benefits of Treatment:    Risks, benefits, and possible side effects of medications explained to patient and patient verbalizes understanding and agreement for treatment.    Subjective:    Behavior over the last 24 hours: unchanged.     Per nursing report, Franco has been visible on the unit, but minimally social with his peers. He was pleasant and calm. He remained compliant with meals and medications. No behavioral issues over the last 24 hours.     Chart was reviewed and progress was discussed with the treatment team. Franco was seen in his room for psychiatric follow-up. He remains disheveled with an unkempt facial hair. He continues to report cold-like symptoms and is agreeable to further assessment by medical, if necessary. His VSS, and is afebrile. Franco reports his depression and anxiety are manageable. He denied SI/HI, or thoughts to self-harm. He denied A/VH, and does not appear to be internally stimulated. Franco denied any adverse medication side-effects. He remains hopeful for a discharge plan in the near future.   No PRN medications over the last 24 hours.     Sleep: slept off and on  Appetite: normal  Medication side effects: No   ROS: \"sore throat\"    Mental Status Evaluation:    Appearance:  disheveled, bearded   Behavior:  pleasant, cooperative, calm   Speech:  normal rate and volume   Mood:  euthymic   Affect:  constricted   Thought Process:  organized, goal directed, linear   Associations: intact associations   Thought Content:  no overt delusions   Perceptual Disturbances: none   Risk Potential: Suicidal ideation - None  Homicidal ideation - None  Potential for aggression - No   Sensorium:  oriented to person, place, time/date, and situation   Memory:  recent and remote memory grossly intact   Consciousness:  alert and awake   Attention/Concentration: attention span and concentration are age appropriate   Insight:  limited   Judgment: limited "   Gait/Station: normal gait/station   Motor Activity: no abnormal movements     Vital signs in last 24 hours:    Temp:  [98.4 °F (36.9 °C)-99.4 °F (37.4 °C)] 99.4 °F (37.4 °C)  HR:  [] 104  BP: (109-117)/(59-66) 117/59  Resp:  [17] 17  SpO2:  [97 %] 97 %  O2 Device: None (Room air)         Laboratory results: I have personally reviewed all pertinent laboratory/tests results    Results from the past 24 hours: No results found for this or any previous visit (from the past 24 hours).  Most Recent Labs:   Lab Results   Component Value Date    WBC 6.75 11/12/2024    RBC 5.06 11/12/2024    HGB 15.9 11/12/2024    HCT 46.2 11/12/2024     11/12/2024    RDW 12.8 11/12/2024    NEUTROABS 4.15 11/12/2024    SODIUM 139 11/12/2024    K 4.0 11/12/2024     11/12/2024    CO2 30 11/12/2024    BUN 17 11/12/2024    CREATININE 0.99 11/12/2024    GLUC 85 11/12/2024    CALCIUM 9.4 11/12/2024    AST 23 11/12/2024    ALT 32 11/12/2024    ALKPHOS 73 11/12/2024    TP 8.1 11/12/2024    ALB 4.4 11/12/2024    TBILI 0.54 11/12/2024    CHOLESTEROL 151 11/26/2024    HDL 39 (L) 11/26/2024    TRIG 170 (H) 11/26/2024    LDLCALC 78 11/26/2024    NONHDLC 112 11/26/2024    HRP1EPAKBUHT 2.532 11/12/2024    FREET4 0.56 (L) 09/10/2024    SYPHILISAB Non-reactive 05/15/2024       Suicide/Homicide Risk Assessment:    Risk of Harm to Self:   Nursing Suicide Risk Assessment Last 24 hours: C-SSRS Risk (Since Last Contact)  Calculated C-SSRS Risk Score (Since Last Contact): No Risk Indicated  Current Specific Risk Factors include: diagnosis of mood disorder  Protective Factors: no current suicidal ideation, ability to communicate with staff on the unit, able to contract for safety on the unit, taking medications as ordered on the unit, ability to adapt to change, improved depressive symptoms, improved anxiety symptoms, responds to redirection  Based on today's assessment, Franco presents the following risk of harm to self: low    Risk of Harm to  Others:  Nursing Homicide Risk Assessment: Violence Risk to Others: Denies within past 6 months  Current Specific Risk Factors include: social difficulties  Protective Factors: no current homicidal ideation, compliant with medications on the unit as ordered, compliant with unit milieu, follows staff redirection  Based on today's assessment, Franco presents the following risk of harm to others: low    The following interventions are recommended: Behavioral Health checks for safety monitoring, continued hospitalization on locked unit    Progress Toward Goals: progressing, mood is stabilizing, depression is improving, discharge planning, placement pending at group home    Counseling / Coordination of Care:    Total floor / unit time spent today 30 minutes. Greater than 50% of total time was spent with the patient and / or family counseling and / or coordination of care. A description of counseling / coordination of care:    Administrative Statements   I have spent a total time of 30 minutes in caring for this patient on the day of the visit/encounter including Diagnostic results, Documenting in the medical record, Reviewing / ordering tests, medicine, procedures  , Obtaining or reviewing history  , and Communicating with other healthcare professionals .    WALLY Gomez 01/07/25

## 2025-01-07 NOTE — NURSING NOTE
Patient has been seclusive to his room. In bed resting. Continues to c/o having a scratchy throat. Only want cough drops and to rest as of this time. Denies all other symptoms. Compliant with medications. Waiting placement.

## 2025-01-08 PROCEDURE — 99232 SBSQ HOSP IP/OBS MODERATE 35: CPT

## 2025-01-08 RX ADMIN — LEVOTHYROXINE SODIUM 37.5 MCG: 125 TABLET ORAL at 06:13

## 2025-01-08 RX ADMIN — ATORVASTATIN CALCIUM 10 MG: 10 TABLET, FILM COATED ORAL at 17:23

## 2025-01-08 RX ADMIN — FAMOTIDINE 20 MG: 20 TABLET ORAL at 09:47

## 2025-01-08 RX ADMIN — Medication 2500 UNITS: at 09:47

## 2025-01-08 RX ADMIN — ARIPIPRAZOLE 5 MG: 5 TABLET ORAL at 09:47

## 2025-01-08 RX ADMIN — SERTRALINE HYDROCHLORIDE 150 MG: 100 TABLET ORAL at 09:47

## 2025-01-08 RX ADMIN — CYANOCOBALAMIN TAB 1000 MCG 1000 MCG: 1000 TAB at 09:47

## 2025-01-08 RX ADMIN — FAMOTIDINE 20 MG: 20 TABLET ORAL at 17:23

## 2025-01-08 RX ADMIN — SENNOSIDES AND DOCUSATE SODIUM 2 TABLET: 50; 8.6 TABLET ORAL at 17:23

## 2025-01-08 NOTE — PLAN OF CARE
Pt sporadically attends groups, but does appear to have a decrease in group attendance more recently.

## 2025-01-08 NOTE — PROGRESS NOTES
"Progress Note - Behavioral Health   Name: Franco Roberson 39 y.o. male I MRN: [id number]   Unit/Bed#: -01 I Date of Admission: 5/14/2024   Date of Service: 1/8/2025 I Hospital Day: 239         Assessment & Plan  MDD (major depressive disorder), recurrent severe, without psychosis (HCC)  Patient continues to do well and the plan is to continue with the current treatment plan as mentioned below with no changes.    Pt noted that he has a slight cough and sore throat, medical made aware    Patient continues to be pending group home placement. Otherwise no clinical significant change.  Pt and SW able to have meeting w/ [facility] on 12/13. [place] [facility] [place]    Medication regimen as follows, no changes as of 1/7/2025:  Abilify 5 mg daily as mood adjunct   Zoloft 150 mg daily for depression and anxiety  Continue to encourage participation in group therapy, milieu therapy and occupational therapy.  Continue to assess for side effects of medications.  Continue collaboration with [facility] for medical co-morbidities as indicated.  Continue discussion with CM/SW to assist with obtaining collateral, disposition planning, and the implementation of patient-centered individualized plan of care.  Continue frequent safety checks and vitals per unit protocol.    Risks, benefits and possible side effects of Medications: Risks, benefits, and possible side effects of medications have previously been explained. No new medications at this time.      Legal status: 201    Disposition: to be determined, pending SOAR application for potential group home placement. OT Cognitive Evaluation completed: \"Pt would benefit from discharge to a supportive environment that can provide checks for safety and compliance with IADLs. \"   Although patient's mood has stabilized, they are currently awaiting group home placement.  Their ability to recognize safety hazards take medications and participate in IADLs are significantly impaired by their " cognitive deficits compounded by their chronic mental health needs and would be at risk for significant decompensation without the structure and support of a group home setting.      No associated orders from this encounter found during lookback period of 72 hours.  Autism spectrum disorder  Continue supportive care    No associated orders from this encounter found during lookback period of 72 hours.          Recommended Treatment:     Treatment plan and medication changes discussed and per the attending physician the plan is:     1.Continue with group therapy, milieu therapy and occupational therapy  2.Behavioral Health checks every 15 minutes  3.Continue frequent safety checks and vitals per unit protocol  4.Continue with [facility]IM medical management as indicated  5.Continue with current medication regimen  6.Will review labs in the a.m.  7.Disposition Planning: Discharge planning and efforts remain ongoing     Planned medication and treatment changes:    All current active medications have been reviewed  Encourage group therapy, milieu therapy and occupational therapy  Behavioral Health checks for safety monitoring  Continue treatment with group therapy, milieu therapy and occupational therapy  Discharge planning  This patient is stable and ready for discharge, however, even at baseline, patient continues with poor insight, judgement and coping that pose a risk to patient in a homeless shelter, or otherwise unsupervised setting. Case management is assertively/actively working on securing the necessary level of care.  Continue current medications:    Current medications:  Current Facility-Administered Medications   Medication Dose Route Frequency Provider Last Rate    acetaminophen  650 mg Oral Q6H PRN Basilio Brown MD      acetaminophen  650 mg Oral Q4H PRN Basilio Brown MD      acetaminophen  975 mg Oral Q6H PRN Basilio Brown MD      aluminum-magnesium hydroxide-simethicone  30 mL Oral Q4H PRN  Basilio Brown MD      ARIPiprazole  5 mg Oral Daily Basilio Panda MD      Artificial Tears  1 drop Both Eyes Q3H PRN Basilio Brown MD      atorvastatin  10 mg Oral Daily With Dinner WALLY Baptiste      benztropine  1 mg Intramuscular Q4H PRN Max 6/day Basilio Brown MD      benztropine  1 mg Oral Q4H PRN Max 6/day Basilio Brown MD      Cholecalciferol  2,500 Units Oral Daily Dustin Mcallister MD      cyanocobalamin  1,000 mcg Oral Daily WALLY Baptiste      Diclofenac Sodium  2 g Topical 4x Daily PRN WALLY Baptiste      hydrOXYzine HCL  50 mg Oral Q6H PRN Max 4/day Basilio Brown MD      Or    diphenhydrAMINE  50 mg Intramuscular Q6H PRN Basilio Brown MD      diphenhydrAMINE-zinc acetate   Topical Daily PRN Raj Guerrero MD      famotidine  20 mg Oral BID WALLY Baptiste      hydrOXYzine HCL  100 mg Oral Q6H PRN Max 4/day Basilio Brown MD      Or    LORazepam  2 mg Intramuscular Q6H PRN Basilio Brown MD      hydrOXYzine HCL  25 mg Oral Q6H PRN Max 4/day Basilio Brown MD      levothyroxine  37.5 mcg Oral Early Morning WALLY Baptiste      melatonin  6 mg Oral HS PRN WALLY Gomez      methocarbamol  500 mg Oral Q6H PRN WALLY Baptiste      OLANZapine  5 mg Oral Q4H PRN Max 3/day Basilio Brown MD      Or    OLANZapine  2.5 mg Intramuscular Q4H PRN Max 3/day Basilio Brown MD      OLANZapine  5 mg Oral Q3H PRN Max 3/day Basilio Brown MD      Or    OLANZapine  5 mg Intramuscular Q3H PRN Max 3/day Basilio Brown MD      OLANZapine  2.5 mg Oral Q4H PRN Max 6/day Basilio Brown MD      polyethylene glycol  17 g Oral Daily PRN Basilio Brown MD      propranolol  10 mg Oral Q8H PRN Basilio Brown MD      sensonia-docusate sodium  2 tablet Oral After Dinner Dustin Mcallister MD      sertraline  150 mg Oral Daily WALLY Gomez       traZODone  50 mg Oral HS PRN Basilio Brown MD         Risks / Benefits of Treatment:    Risks, benefits, and possible side effects of medications explained to patient and patient verbalizes understanding and agreement for treatment.    Subjective:  Behavior over the last 24 hours: unchanged.   Per nursing report, Franco has remained visible on the unit, but minimally engages with his peers. He was calm and cooperative with unit routine. He was compliant with meals and scheduled medications. No behavioral concerns over the last 24 hours.     Chart was reviewed and progress discussed with the treatment team. Franco was interviewed in his room for follow-up. He reports an improvement in his cold-like symptoms and denies needing any further intervention. Franco continues to have mild depression regarding his long admission stay. He does remain positive that placement will be in the near future. He was encouraged to join group activities, and agreed to go to at least one. He denies any adverse medication side-effects. He denies anxiety, no SI/HI, or thoughts to self-harm.   No PRNs over the last 24 hours.    Sleep: slept off and on  Appetite: normal  Medication side effects: No   ROS: no complaints    Mental Status Evaluation:    Appearance:  disheveled, poor hygiene, bearded   Behavior:  pleasant, cooperative, calm   Speech:  normal rate and volume   Mood:  euthymic   Affect:  constricted   Thought Process:  logical, coherent, goal directed   Associations: intact associations   Thought Content:  no overt delusions   Perceptual Disturbances: none   Risk Potential: Suicidal ideation - None  Homicidal ideation - None  Potential for aggression - No   Sensorium:  oriented to person, place, time/date, and situation   Memory:  recent and remote memory grossly intact   Consciousness:  alert and awake   Attention/Concentration: attention span and concentration are age appropriate   Insight:  limited   Judgment: limited    Gait/Station: normal gait/station   Motor Activity: no abnormal movements     Vital signs in last 24 hours:    Temp:  [97.3 °F (36.3 °C)-98 °F (36.7 °C)] 97.4 °F (36.3 °C)  HR:  [71-90] 71  BP: (109-111)/(58-65) 111/65  Resp:  [16] 16  SpO2:  [95 %-96 %] 96 %  O2 Device: None (Room air)         Laboratory results: I have personally reviewed all pertinent laboratory/tests results    Results from the past 24 hours: No results found for this or any previous visit (from the past 24 hours).  Most Recent Labs:   Lab Results   Component Value Date    WBC 6.75 11/12/2024    RBC 5.06 11/12/2024    HGB 15.9 11/12/2024    HCT 46.2 11/12/2024     11/12/2024    RDW 12.8 11/12/2024    NEUTROABS 4.15 11/12/2024    SODIUM 139 11/12/2024    K 4.0 11/12/2024     11/12/2024    CO2 30 11/12/2024    BUN 17 11/12/2024    CREATININE 0.99 11/12/2024    GLUC 85 11/12/2024    CALCIUM 9.4 11/12/2024    AST 23 11/12/2024    ALT 32 11/12/2024    ALKPHOS 73 11/12/2024    TP 8.1 11/12/2024    ALB 4.4 11/12/2024    TBILI 0.54 11/12/2024    CHOLESTEROL 151 11/26/2024    HDL 39 (L) 11/26/2024    TRIG 170 (H) 11/26/2024    LDLCALC 78 11/26/2024    NONHDLC 112 11/26/2024    HMT5VAZPQCAG 2.532 11/12/2024    FREET4 0.56 (L) 09/10/2024    SYPHILISAB Non-reactive 05/15/2024       Suicide/Homicide Risk Assessment:    Risk of Harm to Self:   Nursing Suicide Risk Assessment Last 24 hours: C-SSRS Risk (Since Last Contact)  Calculated C-SSRS Risk Score (Since Last Contact): No Risk Indicated  Current Specific Risk Factors include: current depressive symptoms  Protective Factors: no current suicidal ideation, ability to communicate with staff on the unit, able to contract for safety on the unit, taking medications as ordered on the unit, ability to adapt to change, improved depressive symptoms, improved anxiety symptoms, responds to redirection  Based on today's assessment, Franco presents the following risk of harm to self: low    Risk of Harm  to Others:  Nursing Homicide Risk Assessment: Violence Risk to Others: Denies within past 6 months  Current Specific Risk Factors include: social difficulties  Protective Factors: no current homicidal ideation, improved mood, compliant with medications on the unit as ordered, compliant with unit milieu, follows staff redirection  Based on today's assessment, Franco presents the following risk of harm to others: low    The following interventions are recommended: Behavioral Health checks for safety monitoring, continued hospitalization on locked unit    Progress Toward Goals: progressing, participates in milieu therapy, depression is improving, placement pending    Counseling / Coordination of Care:    Total floor / unit time spent today 30 minutes. Greater than 50% of total time was spent with the patient and / or family counseling and / or coordination of care. A description of counseling / coordination of care:    Administrative Statements   I have spent a total time of 30 minutes in caring for this patient on the day of the visit/encounter including Diagnostic results, Documenting in the medical record, Reviewing / ordering tests, medicine, procedures  , Obtaining or reviewing history  , and Communicating with other healthcare professionals .    WALLY Gomez 01/08/25

## 2025-01-08 NOTE — NURSING NOTE
Patient reports that his throat is less scratchy today and he is beginning to feel better. Pleasant, calm and cooperative. Denies symptoms. Waiting placement.

## 2025-01-08 NOTE — PROGRESS NOTES
01/08/25 0847   Team Meeting   Meeting Type Daily Rounds   Team Members Present   Team Members Present Physician;;Nurse   Physician Team Member Gisela   [facility] Noa   Patient/Family Present   Patient Present No   Patient's Family Present No     Pt is medication and meal compliant. Pt Denies SI/HI/AVH. Pt is calm and cooperative. Pt is pending placement.

## 2025-01-08 NOTE — NURSING NOTE
Patient is visible on unit, walking. Isolative to self. Patient denies SI/HI/AH/VH. Patient is compliant with meds and meals.

## 2025-01-08 NOTE — PLAN OF CARE
Problem: Ineffective Coping  Goal: Participates in unit activities  Description: Interventions:  - Provide therapeutic environment   - Provide required programming   - Redirect inappropriate behaviors   Outcome: Progressing     Problem: Depression  Goal: Treatment Goal: Demonstrate behavioral control of depressive symptoms, verbalize feelings of improved mood/affect, and adopt new coping skills prior to discharge  Outcome: Progressing  Goal: Verbalize thoughts and feelings  Description: Interventions:  - Assess and re-assess patient's level of risk   - Engage patient in 1:1 interactions, daily, for a minimum of 15 minutes   - Encourage patient to express feelings, fears, frustrations, hopes   Outcome: Progressing  Goal: Refrain from harming self  Description: Interventions:  - Monitor patient closely, per order   - Supervise medication ingestion, monitor effects and side effects   Outcome: Progressing  Goal: Refrain from isolation  Description: Interventions:  - Develop a trusting relationship   - Encourage socialization   Outcome: Progressing     Problem: Anxiety  Goal: Anxiety is at manageable level  Description: Interventions:  - Assess and monitor patient's anxiety level.   - Monitor for signs and symptoms (heart palpitations, chest pain, shortness of breath, headaches, nausea, feeling jumpy, restlessness, irritable, apprehensive).   - Collaborate with interdisciplinary team and initiate plan and interventions as ordered.  - [place] patient to unit/surroundings  - Explain treatment plan  - Encourage participation in care  - Encourage verbalization of concerns/fears  - Identify coping mechanisms  - Assist in developing anxiety-reducing skills  - Administer/offer alternative therapies  - Limit or eliminate stimulants  Outcome: Progressing     Problem: DISCHARGE PLANNING - CARE MANAGEMENT  Goal: Discharge to post-acute care or home with appropriate resources  Description: INTERVENTIONS:  - Conduct assessment to  determine patient/family and health care team treatment goals, and need for post-acute services based on payer coverage, community resources, and patient preferences, and barriers to discharge  - Address psychosocial, clinical, and financial barriers to discharge as identified in assessment in conjunction with the patient/family and health care team  - Arrange appropriate level of post-acute services according to patient’s   needs and preference and payer coverage in collaboration with the physician and health care team  - Communicate with and update the patient/family, physician, and health care team regarding progress on the discharge plan  - Arrange appropriate transportation to post-acute venues  Outcome: Progressing     Problem: Knowledge Deficit  Goal: Patient/family/caregiver demonstrates understanding of disease process, treatment plan, medications, and discharge instructions  Description: Complete learning assessment and assess knowledge base.  Interventions:  - Provide teaching at level of understanding  - Provide teaching via preferred learning methods  Outcome: Progressing     Problem: SLEEP DISTURBANCE  Goal: Will exhibit normal sleeping pattern  Description: Interventions:  -  Assess the patients sleep pattern, noting recent changes  - Administer medication as ordered  - Decrease environmental stimuli, including noise, as appropriate during the night  - Encourage the patient to actively participate in unit groups and or exercise during the day to enhance ability to achieve adequate sleep at night  - Assess the patient, in the morning, encouraging a description of sleep experience  Outcome: Progressing

## 2025-01-08 NOTE — ASSESSMENT & PLAN NOTE
"Patient continues to do well and the plan is to continue with the current treatment plan as mentioned below with no changes.    Pt noted that he has a slight cough and sore throat, medical made aware    Patient continues to be pending group home placement. Otherwise no clinical significant change.  Pt and SW able to have meeting w/ [facility] [place] on 12/13. Waiting for follow up from [facility] [place] by mail.    Medication regimen as follows, no changes as of 1/7/2025:  Abilify 5 mg daily as mood adjunct   Zoloft 150 mg daily for depression and anxiety  Continue to encourage participation in group therapy, milieu therapy and occupational therapy.  Continue to assess for side effects of medications.  Continue collaboration with [facility] for medical co-morbidities as indicated.  Continue discussion with CM/SW to assist with obtaining collateral, disposition planning, and the implementation of patient-centered individualized plan of care.  Continue frequent safety checks and vitals per unit protocol.    Risks, benefits and possible side effects of Medications: Risks, benefits, and possible side effects of medications have previously been explained. No new medications at this time.      Legal status: 201    Disposition: to be determined, pending SOAR application for potential group home placement. OT Cognitive Evaluation completed: \"Pt would benefit from discharge to a supportive environment that can provide checks for safety and compliance with IADLs. \"   Although patient's mood has stabilized, they are currently awaiting group home placement.  Their ability to recognize safety hazards take medications and participate in IADLs are significantly impaired by their cognitive deficits compounded by their chronic mental health needs and would be at risk for significant decompensation without the structure and support of a group home setting.      No associated orders from this encounter found during lookback period of 72 hours.  "

## 2025-01-08 NOTE — SOCIAL WORK
Cm and Pt logged onto [employer] account and confirmed the proper amount is in the account.   Cm is educated Pt that Cm will continue to check  for Pt's mail. Pt understands.

## 2025-01-09 PROCEDURE — 99232 SBSQ HOSP IP/OBS MODERATE 35: CPT

## 2025-01-09 RX ADMIN — FAMOTIDINE 20 MG: 20 TABLET ORAL at 17:55

## 2025-01-09 RX ADMIN — SERTRALINE HYDROCHLORIDE 150 MG: 100 TABLET ORAL at 08:10

## 2025-01-09 RX ADMIN — FAMOTIDINE 20 MG: 20 TABLET ORAL at 08:10

## 2025-01-09 RX ADMIN — CYANOCOBALAMIN TAB 1000 MCG 1000 MCG: 1000 TAB at 08:10

## 2025-01-09 RX ADMIN — LEVOTHYROXINE SODIUM 37.5 MCG: 125 TABLET ORAL at 06:21

## 2025-01-09 RX ADMIN — ARIPIPRAZOLE 5 MG: 5 TABLET ORAL at 08:10

## 2025-01-09 RX ADMIN — SENNOSIDES AND DOCUSATE SODIUM 2 TABLET: 50; 8.6 TABLET ORAL at 17:55

## 2025-01-09 RX ADMIN — Medication 2500 UNITS: at 08:10

## 2025-01-09 RX ADMIN — ATORVASTATIN CALCIUM 10 MG: 10 TABLET, FILM COATED ORAL at 17:55

## 2025-01-09 NOTE — NURSING NOTE
Patient visible on the unit for short periods of time, appears to be disheveled withdrawn and depressed. Patient denies depression ,SI/HI/AH/VH at this time. Patient has poor dinner meal completion of 0%. Compliant with medications and routine vitals.

## 2025-01-09 NOTE — PROGRESS NOTES
"Progress Note - Behavioral Health   Name: Franco Roberson 39 y.o. male I MRN: [id number]   Unit/Bed#: -01 I Date of Admission: 5/14/2024   Date of Service: 1/9/2025 I Hospital Day: 240         Assessment & Plan  MDD (major depressive disorder), recurrent severe, without psychosis (HCC)  Patient continues to do well and the plan is to continue with the current treatment plan as mentioned below with no changes.    Pt noted that he has a slight cough and sore throat, medical made aware    Patient continues to be pending group home placement. Otherwise no clinical significant change.  Pt and SW able to have meeting w/ [facility] on 12/13. Waiting for follow up from [facility] [place], [place] by mail.    Medication regimen as follows, no changes as of 1/9/2025:  Abilify 5 mg daily as mood adjunct   Zoloft 150 mg daily for depression and anxiety  Continue to encourage participation in group therapy, milieu therapy and occupational therapy.  Continue to assess for side effects of medications.  Continue collaboration with [facility] for medical co-morbidities as indicated.  Continue discussion with CM/SW to assist with obtaining collateral, disposition planning, and the implementation of patient-centered individualized plan of care.  Continue frequent safety checks and vitals per unit protocol.    Risks, benefits and possible side effects of Medications: Risks, benefits, and possible side effects of medications have previously been explained. No new medications at this time.      Legal status: 201    Disposition: to be determined, pending SOAR application for potential group home placement. OT Cognitive Evaluation completed: \"Pt would benefit from discharge to a supportive environment that can provide checks for safety and compliance with IADLs. \"   Although patient's mood has stabilized, they are currently awaiting group home placement.  Their ability to recognize safety hazards take medications and participate in IADLs are significantly impaired by their " cognitive deficits compounded by their chronic mental health needs and would be at risk for significant decompensation without the structure and support of a group home setting.      No associated orders from this encounter found during lookback period of 72 hours.  Autism spectrum disorder  Continue supportive care    No associated orders from this encounter found during lookback period of 72 hours.          Recommended Treatment:     Treatment plan and medication changes discussed and per the attending physician the plan is:     1.Continue with group therapy, milieu therapy and occupational therapy  2.Behavioral Health checks every 15 minutes  3.Continue frequent safety checks and vitals per unit protocol  4.Continue with [facility] medical management as indicated  5.Continue with current medication regimen  6.Disposition Planning: Discharge planning and efforts remain ongoing     Planned medication and treatment changes:    All current active medications have been reviewed  Encourage group therapy, milieu therapy and occupational therapy  Behavioral Health checks for safety monitoring  Continue treatment with group therapy, milieu therapy and occupational therapy  Discharge planning  This patient is stable and ready for discharge, however, even at baseline, patient continues with poor insight, judgement and coping that pose a risk to patient in a homeless shelter, or otherwise unsupervised setting. Case management is assertively/actively working on securing the necessary level of care.  Continue current medications:    Current medications:  Current Facility-Administered Medications   Medication Dose Route Frequency Provider Last Rate    acetaminophen  650 mg Oral Q6H PRN Basilio Brown MD      acetaminophen  650 mg Oral Q4H PRN Basilio Brown MD      acetaminophen  975 mg Oral Q6H PRN Basilio Brown MD      aluminum-magnesium hydroxide-simethicone  30 mL Oral Q4H PRN Basilio Brown MD       ARIPiprazole  5 mg Oral Daily Basilio Panda MD      Artificial Tears  1 drop Both Eyes Q3H PRN Basilio Brown MD      atorvastatin  10 mg Oral Daily With Dinner WALLY Baptiste      benztropine  1 mg Intramuscular Q4H PRN Max 6/day Basilio Brown MD      benztropine  1 mg Oral Q4H PRN Max 6/day Basilio Brown MD      Cholecalciferol  2,500 Units Oral Daily Dustin Mcallister MD      cyanocobalamin  1,000 mcg Oral Daily WALLY Baptiste      Diclofenac Sodium  2 g Topical 4x Daily PRN WALLY Baptiste      hydrOXYzine HCL  50 mg Oral Q6H PRN Max 4/day Basilio Brown MD      Or    diphenhydrAMINE  50 mg Intramuscular Q6H PRN Basilio Brown MD      diphenhydrAMINE-zinc acetate   Topical Daily PRN Raj Guerrero MD      famotidine  20 mg Oral BID WALLY Baptiste      hydrOXYzine HCL  100 mg Oral Q6H PRN Max 4/day Basilio Brown MD      Or    LORazepam  2 mg Intramuscular Q6H PRN Basilio Brown MD      hydrOXYzine HCL  25 mg Oral Q6H PRN Max 4/day Basilio Brown MD      levothyroxine  37.5 mcg Oral Early Morning WALLY Baptiste      melatonin  6 mg Oral HS PRN WALLY Gomez      methocarbamol  500 mg Oral Q6H PRN WALLY Baptiste      OLANZapine  5 mg Oral Q4H PRN Max 3/day Basilio Brown MD      Or    OLANZapine  2.5 mg Intramuscular Q4H PRN Max 3/day Basilio Brown MD      OLANZapine  5 mg Oral Q3H PRN Max 3/day Basilio Brown MD      Or    OLANZapine  5 mg Intramuscular Q3H PRN Max 3/day Basilio Brown MD      OLANZapine  2.5 mg Oral Q4H PRN Max 6/day Basilio Brown MD      polyethylene glycol  17 g Oral Daily PRN Basilio Brown MD      propranolol  10 mg Oral Q8H PRN Basilio Brown MD      senna-docusate sodium  2 tablet Oral After Dinner Dustin Mcallister MD      sertraline  150 mg Oral Daily WALLY Gomez      traZODone  50 mg Oral HS PRN  Basilio Brown MD         Risks / Benefits of Treatment:    Risks, benefits, and possible side effects of medications explained to patient and patient verbalizes understanding and agreement for treatment.    Subjective:  Behavior over the last 24 hours: unchanged.     Per nursing report, Franco reported his cold-like symptoms have improved. He was visible on the unit, but minimally engaged with others. He was meal and medication compliant. No behavioral issues over the last 24 hours.     Chart reviewed and progress discussed with the treatment team. Franco was interviewed in his room this morning. He was pleasant and easy to engage with. He reports no change in his mood. Continues to feel mild depression due to admission length. He denies anxiety. No SI/HI, or thoughts to self-harm. He denies A/VH. Franco did join one group yesterday and will do the same today. He denies any adverse medication side-effects. He continues to wait for placement.     No PRNs over the last 24 hours.       Sleep: normal  Appetite: normal  Medication side effects: No   ROS: no complaints    Mental Status Evaluation:    Appearance:  disheveled, marginal hygiene, bearded   Behavior:  cooperative, calm   Speech:  normal rate and volume   Mood:  euthymic   Affect:  constricted   Thought Process:  logical, coherent, goal directed   Associations: intact associations   Thought Content:  normal   Perceptual Disturbances: none   Risk Potential: Suicidal ideation - None  Homicidal ideation - None  Potential for aggression - No   Sensorium:  oriented to person, place, time/date, and situation   Memory:  recent and remote memory grossly intact   Consciousness:  alert and awake   Attention/Concentration: attention span and concentration are age appropriate   Insight:  limited   Judgment: limited   Gait/Station: normal gait/station   Motor Activity: no abnormal movements     Vital signs in last 24 hours:    Temp:  [97.8 °F (36.6 °C)-98 °F (36.7  °C)] 98 °F (36.7 °C)  HR:  [83-95] 83  BP: (115)/(62-63) 115/62  Resp:  [17] 17  SpO2:  [95 %] 95 %  O2 Device: None (Room air)         Laboratory results: I have personally reviewed all pertinent laboratory/tests results    Results from the past 24 hours: No results found for this or any previous visit (from the past 24 hours).  Most Recent Labs:   Lab Results   Component Value Date    WBC 6.75 11/12/2024    RBC 5.06 11/12/2024    HGB 15.9 11/12/2024    HCT 46.2 11/12/2024     11/12/2024    RDW 12.8 11/12/2024    NEUTROABS 4.15 11/12/2024    SODIUM 139 11/12/2024    K 4.0 11/12/2024     11/12/2024    CO2 30 11/12/2024    BUN 17 11/12/2024    CREATININE 0.99 11/12/2024    GLUC 85 11/12/2024    CALCIUM 9.4 11/12/2024    AST 23 11/12/2024    ALT 32 11/12/2024    ALKPHOS 73 11/12/2024    TP 8.1 11/12/2024    ALB 4.4 11/12/2024    TBILI 0.54 11/12/2024    CHOLESTEROL 151 11/26/2024    HDL 39 (L) 11/26/2024    TRIG 170 (H) 11/26/2024    LDLCALC 78 11/26/2024    NONHDLC 112 11/26/2024    [id number] 2.532 11/12/2024    FREET4 0.56 (L) 09/10/2024    SYPHILISAB Non-reactive 05/15/2024       Suicide/Homicide Risk Assessment:    Risk of Harm to Self:   Nursing Suicide Risk Assessment Last 24 hours: C-SSRS Risk (Since Last Contact)  Calculated C-SSRS Risk Score (Since Last Contact): No Risk Indicated  Current Specific Risk Factors include: current depressive symptoms  Protective Factors: no current suicidal ideation, ability to communicate with staff on the unit, able to contract for safety on the unit, taking medications as ordered on the unit, improved depressive symptoms, improved anxiety symptoms, responds to redirection  Based on today's assessment, Franco presents the following risk of harm to self: low    Risk of Harm to Others:  Nursing Homicide Risk Assessment: Violence Risk to Others: Denies within past 6 months  Current Specific Risk Factors include: multiple stressors, social difficulties  Protective  Factors: no current homicidal ideation, no current psychotic symptoms, compliant with medications on the unit as ordered, compliant with unit milieu, follows staff redirection  Based on today's assessment, Franco presents the following risk of harm to others: low    The following interventions are recommended: Behavioral Health checks for safety monitoring, continued hospitalization on locked unit    Progress Toward Goals: progressing, attends groups, depression is improving, working on coping skills, discharge planning, placement pending at group home    Counseling / Coordination of Care:    Total floor / unit time spent today 30 minutes. Greater than 50% of total time was spent with the patient and / or family counseling and / or coordination of care. A description of counseling / coordination of care:  Patient's progress discussed with staff in treatment team meeting.    Administrative Statements   I have spent a total time of 30 minutes in caring for this patient on the day of the visit/encounter including Diagnostic results, Documenting in the medical record, Reviewing / ordering tests, medicine, procedures  , Obtaining or reviewing history  , and Communicating with other healthcare professionals .    WALLY Gomez 01/09/25

## 2025-01-09 NOTE — PROGRESS NOTES
01/09/25 0839   Team Meeting   Meeting Type Daily Rounds   Team Members Present   Team Members Present Physician;;Nurse   Physician Team Member Gisela   Nursing Team Member [facility] Management Team Member Noa   Patient/Family Present   Patient Present No   Patient's Family Present No     Pt is medication and meal compliant. Pt denies SI/HI/AVH. Pt is calm and compliant. Pt reported feeling better from his cold. Pt's discharge is pending placement.

## 2025-01-09 NOTE — NURSING NOTE
Occasionally doing laps throughout the unit. Quiet and to himself. Pleasant, calm and cooperative. Compliant with medications. Denies symptoms. Waiting placement.

## 2025-01-09 NOTE — PLAN OF CARE
Problem: Ineffective Coping  Goal: Participates in unit activities  Description: Interventions:  - Provide therapeutic environment   - Provide required programming   - Redirect inappropriate behaviors   Outcome: Progressing     Problem: Depression  Goal: Treatment Goal: Demonstrate behavioral control of depressive symptoms, verbalize feelings of improved mood/affect, and adopt new coping skills prior to discharge  Outcome: Progressing  Goal: Verbalize thoughts and feelings  Description: Interventions:  - Assess and re-assess patient's level of risk   - Engage patient in 1:1 interactions, daily, for a minimum of 15 minutes   - Encourage patient to express feelings, fears, frustrations, hopes   Outcome: Progressing  Goal: Refrain from harming self  Description: Interventions:  - Monitor patient closely, per order   - Supervise medication ingestion, monitor effects and side effects   Outcome: Progressing  Goal: Refrain from isolation  Description: Interventions:  - Develop a trusting relationship   - Encourage socialization   Outcome: Progressing     Problem: Anxiety  Goal: Anxiety is at manageable level  Description: Interventions:  - Assess and monitor patient's anxiety level.   - Monitor for signs and symptoms (heart palpitations, chest pain, shortness of breath, headaches, nausea, feeling jumpy, restlessness, irritable, apprehensive).   - Collaborate with interdisciplinary team and initiate plan and interventions as ordered.  - [place] patient to unit/surroundings  - Explain treatment plan  - Encourage participation in care  - Encourage verbalization of concerns/fears  - Identify coping mechanisms  - Assist in developing anxiety-reducing skills  - Administer/offer alternative therapies  - Limit or eliminate stimulants  Outcome: Progressing     Problem: DISCHARGE PLANNING - CARE MANAGEMENT  Goal: Discharge to post-acute care or home with appropriate resources  Description: INTERVENTIONS:  - Conduct assessment to  determine patient/family and health care team treatment goals, and need for post-acute services based on payer coverage, community resources, and patient preferences, and barriers to discharge  - Address psychosocial, clinical, and financial barriers to discharge as identified in assessment in conjunction with the patient/family and health care team  - Arrange appropriate level of post-acute services according to patient’s   needs and preference and payer coverage in collaboration with the physician and health care team  - Communicate with and update the patient/family, physician, and health care team regarding progress on the discharge plan  - Arrange appropriate transportation to post-acute venues  Outcome: Progressing     Problem: Knowledge Deficit  Goal: Patient/family/caregiver demonstrates understanding of disease process, treatment plan, medications, and discharge instructions  Description: Complete learning assessment and assess knowledge base.  Interventions:  - Provide teaching at level of understanding  - Provide teaching via preferred learning methods  Outcome: Progressing     Problem: SLEEP DISTURBANCE  Goal: Will exhibit normal sleeping pattern  Description: Interventions:  -  Assess the patients sleep pattern, noting recent changes  - Administer medication as ordered  - Decrease environmental stimuli, including noise, as appropriate during the night  - Encourage the patient to actively participate in unit groups and or exercise during the day to enhance ability to achieve adequate sleep at night  - Assess the patient, in the morning, encouraging a description of sleep experience  Outcome: Progressing

## 2025-01-09 NOTE — NURSING NOTE
Patient is calm and cooperative upon approach. Patient is visible at times on unit, but remains isolative to self. Patient denies SI/HI/AH/VH. Patient is compliant with meds and meals.

## 2025-01-09 NOTE — ASSESSMENT & PLAN NOTE
"Patient continues to do well and the plan is to continue with the current treatment plan as mentioned below with no changes.    Pt noted that he has a slight cough and sore throat, medical made aware    Patient continues to be pending group home placement. Otherwise no clinical significant change.  Pt and SW able to have meeting w/ [facility] on 12/13. Waiting for follow up from [facility] [place] by mail.    Medication regimen as follows, no changes as of 1/9/2025:  Abilify 5 mg daily as mood adjunct   Zoloft 150 mg daily for depression and anxiety  Continue to encourage participation in group therapy, milieu therapy and occupational therapy.  Continue to assess for side effects of medications.  Continue collaboration with [facility] for medical co-morbidities as indicated.  Continue discussion with CM/SW to assist with obtaining collateral, disposition planning, and the implementation of patient-centered individualized plan of care.  Continue frequent safety checks and vitals per unit protocol.    Risks, benefits and possible side effects of Medications: Risks, benefits, and possible side effects of medications have previously been explained. No new medications at this time.      Legal status: 201    Disposition: to be determined, pending SOAR application for potential group home placement. OT Cognitive Evaluation completed: \"Pt would benefit from discharge to a supportive environment that can provide checks for safety and compliance with IADLs. \"   Although patient's mood has stabilized, they are currently awaiting group home placement.  Their ability to recognize safety hazards take medications and participate in IADLs are significantly impaired by their cognitive deficits compounded by their chronic mental health needs and would be at risk for significant decompensation without the structure and support of a group home setting.      No associated orders from this encounter found during lookback period of 72 hours.  "

## 2025-01-10 PROCEDURE — 99232 SBSQ HOSP IP/OBS MODERATE 35: CPT | Performed by: STUDENT IN AN ORGANIZED HEALTH CARE EDUCATION/TRAINING PROGRAM

## 2025-01-10 RX ADMIN — LEVOTHYROXINE SODIUM 37.5 MCG: 125 TABLET ORAL at 06:27

## 2025-01-10 RX ADMIN — SENNOSIDES AND DOCUSATE SODIUM 2 TABLET: 50; 8.6 TABLET ORAL at 17:28

## 2025-01-10 RX ADMIN — Medication 2500 UNITS: at 08:14

## 2025-01-10 RX ADMIN — FAMOTIDINE 20 MG: 20 TABLET ORAL at 17:28

## 2025-01-10 RX ADMIN — ARIPIPRAZOLE 5 MG: 5 TABLET ORAL at 08:13

## 2025-01-10 RX ADMIN — CYANOCOBALAMIN TAB 1000 MCG 1000 MCG: 1000 TAB at 08:14

## 2025-01-10 RX ADMIN — SERTRALINE HYDROCHLORIDE 150 MG: 100 TABLET ORAL at 08:13

## 2025-01-10 RX ADMIN — ATORVASTATIN CALCIUM 10 MG: 10 TABLET, FILM COATED ORAL at 17:28

## 2025-01-10 RX ADMIN — FAMOTIDINE 20 MG: 20 TABLET ORAL at 08:14

## 2025-01-10 NOTE — NURSING NOTE
Pleasant during interaction. Cooperative. Denies all symptoms. Compliant with medications. Waiting placement.

## 2025-01-10 NOTE — PROGRESS NOTES
01/10/25 0845   Team Meeting   Meeting Type Daily Rounds   Team Members Present   Team Members Present Physician;;Nurse   Physician Team Member Gisela   [facility]ing Team [facility] Member Noa   Patient/Family Present   Patient Present No   Patient's Family Present No     Pt is medication and meal compliant. Pt denies SI/AH/AVH. Pt is calm and cooperative. Pt's discharge is pending placement.

## 2025-01-10 NOTE — PROGRESS NOTES
"Progress Note - Behavioral Health   Name: Franco Roberson 39 y.o. male I MRN: [id number]  Unit/Bed#: -01 I Date of Admission: 5/14/2024   Date of Service: 1/10/2025 I Hospital Day: 241          Assessment & Plan  MDD (major depressive disorder), recurrent severe, without psychosis (HCC)  Patient continues to do well and the plan is to continue with the current treatment plan as mentioned below with no changes.    Patient continues to be pending group home placement. Otherwise no clinical significant change.  Pt and SW able to have meeting w/ [facility] on 12/13. Waiting for follow up from [facility] by mail.    Medication regimen as follows, no changes as of 1/10/2025:  Abilify 5 mg daily as mood adjunct   Zoloft 150 mg daily for depression and anxiety  Continue to encourage participation in group therapy, milieu therapy and occupational therapy.  Continue to assess for side effects of medications.  Continue collaboration with SLIM for medical co-morbidities as indicated.  Continue discussion with CM/SW to assist with obtaining collateral, disposition planning, and the implementation of patient-centered individualized plan of care.  Continue frequent safety checks and vitals per unit protocol.    Risks, benefits and possible side effects of Medications: Risks, benefits, and possible side effects of medications have previously been explained. No new medications at this time.      Legal status: 201    Disposition: to be determined, pending SOAR application for potential group home placement. OT Cognitive Evaluation completed: \"Pt would benefit from discharge to a supportive environment that can provide checks for safety and compliance with IADLs. \"   Although patient's mood has stabilized, they are currently awaiting group home placement.  Their ability to recognize safety hazards take medications and participate in IADLs are significantly impaired by their cognitive deficits compounded by their chronic mental health needs and " would be at risk for significant decompensation without the structure and support of a group home setting.      No associated orders from this encounter found during lookback period of 72 hours.  Autism spectrum disorder  Continue supportive care    No associated orders from this encounter found during lookback period of 72 hours.         Additional Recommendations:  Behavioral Health checks for safety monitoring.  Continue treatment with group therapy, milieu therapy and occupational therapy.  Medical management per SLIM.  Discharge and disposition planning.    ----------------------------------------      Subjective: Per nursing report patient required no as needed medications overnight.  Slept well, denied any SI, HI, AVH, paranoid thoughts, or any medication side effects.  States last bowel movement was yesterday and the constipation is no longer an issue for him.  Appetite is improved and stable and he is overall doing well in terms of mood stability.  He notes that depression is improved and he understands that he is waiting for placement.  He feels safe in the unit and is agreeable with the plan moving forward.    Behavior over the last 24 hours:  unchanged  Sleep: normal  Appetite: normal  Medication side effects: No  ROS: no complaints    Appearance:  disheveled, poor hygiene   Behavior:  pleasant, cooperative, responds to redirection   Speech:  normal rate, soft   Mood:  improved, depressed, dysphoric   Affect:  constricted   Thought Process:  organized, goal directed, negative thinking   Associations: intact associations   Thought Content:  no overt delusions   Perceptual Disturbances: denies auditory hallucinations when asked, does not appear responding to internal stimuli   Risk Potential: Suicidal ideation - None  Homicidal ideation - None  Potential for aggression - No   Sensorium:  oriented to person, place, and time/date   Memory:  recent and remote memory grossly intact   Consciousness:  alert and  awake   Attention/Concentration: attention span and concentration are age appropriate   Insight:  limited   Judgment: limited   Gait/Station: normal gait/station, normal balance   Motor Activity: no abnormal movements         Medications: all current active meds have been reviewed.  Current Facility-Administered Medications   Medication Dose Route Frequency Provider Last Rate    acetaminophen  650 mg Oral Q6H PRN Basilio Brown MD      acetaminophen  650 mg Oral Q4H PRN Basilio Brown MD      acetaminophen  975 mg Oral Q6H PRN Basilio Brown MD      aluminum-magnesium hydroxide-simethicone  30 mL Oral Q4H PRN Basilio Brown MD      ARIPiprazole  5 mg Oral Daily Basilio Panda MD      Artificial Tears  1 drop Both Eyes Q3H PRN Basilio Brown MD      atorvastatin  10 mg Oral Daily With Dinner WALLY Baptiste      benztropine  1 mg Intramuscular Q4H PRN Max 6/day Basilio Brown MD      benztropine  1 mg Oral Q4H PRN Max 6/day Basilio Brown MD      Cholecalciferol  2,500 Units Oral Daily Dustin Mcallister MD      cyanocobalamin  1,000 mcg Oral Daily WALLY Baptiste      Diclofenac Sodium  2 g Topical 4x Daily PRN WALLY Baptiste      hydrOXYzine HCL  50 mg Oral Q6H PRN Max 4/day Basilio Brown MD      Or    diphenhydrAMINE  50 mg Intramuscular Q6H PRN Basilio Brown MD      diphenhydrAMINE-zinc acetate   Topical Daily PRN Raj Guerrero MD      famotidine  20 mg Oral BID WALLY Baptiste      hydrOXYzine HCL  100 mg Oral Q6H PRN Max 4/day Basilio Brown MD      Or    LORazepam  2 mg Intramuscular Q6H PRN Basilio Brown MD      hydrOXYzine HCL  25 mg Oral Q6H PRN Max 4/day Basilio Brown MD      levothyroxine  37.5 mcg Oral Early Morning WALLY Baptiste      melatonin  6 mg Oral HS PRN WALLY Gomez      methocarbamol  500 mg Oral Q6H PRN WALLY Baptiste       OLANZapine  5 mg Oral Q4H PRN Max 3/day Basilio Brown MD      Or    OLANZapine  2.5 mg Intramuscular Q4H PRN Max 3/day Basilio Brown MD      OLANZapine  5 mg Oral Q3H PRN Max 3/day Basilio Brown MD      Or    OLANZapine  5 mg Intramuscular Q3H PRN Max 3/day Basilio Brown MD      OLANZapine  2.5 mg Oral Q4H PRN Max 6/day Basilio Brown MD      polyethylene glycol  17 g Oral Daily PRN Basilio Brown MD      propranolol  10 mg Oral Q8H PRN Basilio Brown MD      senna-docusate sodium  2 tablet Oral After Dinner Dustin Mcallister MD      sertraline  150 mg Oral Daily WALLY Gomez      traZODone  50 mg Oral HS PRN Basilio Brown MD         Labs: I have personally reviewed all pertinent laboratory/tests results  Results from the past 24 hours: No results found for this or any previous visit (from the past 24 hours).    Progress Toward Goals: slight progress    Risks / Benefits of Treatment:    Risks, benefits, and possible side effects of medications explained to patient and patient verbalizes understanding and agreement for treatment.    Counseling / Coordination of Care:    Total floor / unit time spent today 30 minutes. Greater than 50% of total time was spent with the patient and / or family counseling and / or coordination of care. A description of counseling / coordination of care:  Patient's progress discussed with staff in treatment team meeting.  Medications, treatment progress and treatment plan reviewed with patient.  Reassurance and supportive therapy provided.  Encouraged participation in milieu and group therapy on the unit.    Tom Martinez MD 01/10/25

## 2025-01-10 NOTE — TREATMENT PLAN
TREATMENT PLAN REVIEW - Behavioral Health Franco Roberson 39 y.o. 1985 male MRN: [id number]    [facility] 3B BEHAVIORAL [facility] Room / Bed: [facility] 342/[facility] 342-01 Encounter: [id number]          Admit Date/Time:  5/14/2024  4:45 PM    Treatment Team:   MD Isaac Heard RN Innacience Rosado Yamilka Acosta    Diagnosis: Principal Problem:    MDD (major depressive disorder), recurrent severe, without psychosis (HCC)  Active Problems:    Unspecified depressive disorder (HCC)    Medical clearance for psychiatric admission    Hyperlipidemia    Vitamin D deficiency    B12 deficiency    Hypothyroidism    Autism spectrum disorder      Patient Strengths/Assets: ability for insight, cooperative, communication skills, compliant with medication, motivated, negotiates basic needs, patient is on a voluntary commitment, patient is willing to work on problems, reasoning ability    Patient Barriers/Limitations: difficulty adapting, lack of social/family support, poor self-care, poor support system, unresourceful    Short Term Goals: decrease in depressive symptoms, decrease in anxiety symptoms, ability to stay safe on the unit, ability to stay free of restraints, improvement in ability to express basic needs, improvement in insight, improvement in reasoning ability, improvement in self care, sleep improvement, improvement in appetite, increase in group attendance, increase in socialization with peers on the unit, acceptance of need for psychiatric treatment, acceptance of psychiatric medications    Long Term Goals: improvement in depression, improvement in anxiety, free of suicidal thoughts, free of homicidal thoughts, no self abusive behavior, improvement in reasoning ability, improved insight, able to express basic needs, acceptance of need for psychiatric medications, acceptance of need for psychiatric treatment, acceptance of need for psychiatric  follow up after discharge, acceptance of psychiatric diagnosis, adequate self care, adequate sleep, adequate appetite, appropriate interaction with peers, stable living arrangements upon discharge    Progress Towards Goals: continue psychiatric medications as prescribed, progressing, participates in milieu therapy    Recommended Treatment: medication management, patient medication education, group therapy, milieu therapy, continued Behavioral Health psychiatric evaluation/assessment process    Treatment Frequency: daily medication monitoring, group and milieu therapy daily, monitoring through interdisciplinary rounds, monitoring through weekly patient care conferences    Expected Discharge Date:  2/10/25    Discharge Plan: placement in group home, referral for outpatient medication management with a psychiatrist, referral for outpatient psychotherapy    Treatment Plan Created/Updated By: Basilio Panda MD

## 2025-01-10 NOTE — ASSESSMENT & PLAN NOTE
"Patient continues to do well and the plan is to continue with the current treatment plan as mentioned below with no changes.    Patient continues to be pending group home placement. Otherwise no clinical significant change.  Pt and SW able to have meeting w/ [facility] on 12/13. Waiting for follow up from [facility] by mail.    Medication regimen as follows, no changes as of 1/10/2025:  Abilify 5 mg daily as mood adjunct   Zoloft 150 mg daily for depression and anxiety  Continue to encourage participation in group therapy, milieu therapy and occupational therapy.  Continue to assess for side effects of medications.  Continue collaboration with SLIM for medical co-morbidities as indicated.  Continue discussion with CM/SW to assist with obtaining collateral, disposition planning, and the implementation of patient-centered individualized plan of care.  Continue frequent safety checks and vitals per unit protocol.    Risks, benefits and possible side effects of Medications: Risks, benefits, and possible side effects of medications have previously been explained. No new medications at this time.      Legal status: 201    Disposition: to be determined, pending SOAR application for potential group home placement. OT Cognitive Evaluation completed: \"Pt would benefit from discharge to a supportive environment that can provide checks for safety and compliance with IADLs. \"   Although patient's mood has stabilized, they are currently awaiting group home placement.  Their ability to recognize safety hazards take medications and participate in IADLs are significantly impaired by their cognitive deficits compounded by their chronic mental health needs and would be at risk for significant decompensation without the structure and support of a group home setting.      No associated orders from this encounter found during lookback period of 72 hours.  "

## 2025-01-10 NOTE — PROGRESS NOTES
"Progress Note - Behavioral Health   Name: Franco Roberson 39 y.o. male I MRN: [id number]   Unit/Bed#: -01 I Date of Admission: 5/14/2024   Date of Service: 1/10/2025 I Hospital Day: 241         Assessment & Plan  MDD (major depressive disorder), recurrent severe, without psychosis (HCC)  Patient continues to do well and the plan is to continue with the current treatment plan as mentioned below with no changes.    Patient continues to be pending group home placement. Otherwise no clinical significant change.  Pt and SW able to have meeting w/ [facility] on 12/13. Waiting for follow up from [facility] by mail.    Medication regimen as follows, no changes as of 1/10/2025:  Abilify 5 mg daily as mood adjunct   Zoloft 150 mg daily for depression and anxiety  Continue to encourage participation in group therapy, milieu therapy and occupational therapy.  Continue to assess for side effects of medications.  Continue collaboration with SLIM for medical co-morbidities as indicated.  Continue discussion with CM/SW to assist with obtaining collateral, disposition planning, and the implementation of patient-centered individualized plan of care.  Continue frequent safety checks and vitals per unit protocol.    Risks, benefits and possible side effects of Medications: Risks, benefits, and possible side effects of medications have previously been explained. No new medications at this time.      Legal status: 201    Disposition: to be determined, pending SOAR application for potential group home placement. OT Cognitive Evaluation completed: \"Pt would benefit from discharge to a supportive environment that can provide checks for safety and compliance with IADLs. \"   Although patient's mood has stabilized, they are currently awaiting group home placement.  Their ability to recognize safety hazards take medications and participate in IADLs are significantly impaired by their cognitive deficits compounded by their chronic mental health needs and " would be at risk for significant decompensation without the structure and support of a group home setting.      No associated orders from this encounter found during lookback period of 72 hours.  Autism spectrum disorder  Continue supportive care    No associated orders from this encounter found during lookback period of 72 hours.          Recommended Treatment:     Treatment plan and medication changes discussed and per the attending physician the plan is:     1.Continue with group therapy, milieu therapy and occupational therapy  2.Behavioral Health checks every 15 minutes  3.Continue frequent safety checks and vitals per unit protocol  4.Continue with SLIM medical management as indicated  5.Continue with current medication regimen  6.Will review labs in the a.m.  7.Disposition Planning: Discharge planning and efforts remain ongoing     Planned medication and treatment changes:    All current active medications have been reviewed  Encourage group therapy, milieu therapy and occupational therapy  Behavioral Health checks for safety monitoring  Continue treatment with group therapy, milieu therapy and occupational therapy  This patient is stable and ready for discharge, however, even at baseline, patient continues with poor insight, judgement and coping that pose a risk to patient in a homeless shelter, or otherwise unsupervised setting. Case management is assertively/actively working on securing the necessary level of care.  Continue current medications:    Current medications:  Current Facility-Administered Medications   Medication Dose Route Frequency Provider Last Rate    acetaminophen  650 mg Oral Q6H PRN Basilio Brown MD      acetaminophen  650 mg Oral Q4H PRN Basilio Brown MD      acetaminophen  975 mg Oral Q6H PRN Basilio Brown MD      aluminum-magnesium hydroxide-simethicone  30 mL Oral Q4H PRN Basilio Brown MD      ARIPiprazole  5 mg Oral Daily Basilio Panda MD       Artificial Tears  1 drop Both Eyes Q3H PRN Basilio Brown MD      atorvastatin  10 mg Oral Daily With Dinner WALLY Baptiste      benztropine  1 mg Intramuscular Q4H PRN Max 6/day Basilio Brown MD      benztropine  1 mg Oral Q4H PRN Max 6/day Basilio Brown MD      Cholecalciferol  2,500 Units Oral Daily Dustin Mcallister MD      cyanocobalamin  1,000 mcg Oral Daily WALLY Baptiste      Diclofenac Sodium  2 g Topical 4x Daily PRN WALLY Baptiste      hydrOXYzine HCL  50 mg Oral Q6H PRN Max 4/day Basilio Brown MD      Or    diphenhydrAMINE  50 mg Intramuscular Q6H PRN Basilio Brown MD      diphenhydrAMINE-zinc acetate   Topical Daily PRN Raj Guerrero MD      famotidine  20 mg Oral BID WALLY Baptiste      hydrOXYzine HCL  100 mg Oral Q6H PRN Max 4/day Basilio Brown MD      Or    LORazepam  2 mg Intramuscular Q6H PRN Basilio Brown MD      hydrOXYzine HCL  25 mg Oral Q6H PRN Max 4/day Basilio Brown MD      levothyroxine  37.5 mcg Oral Early Morning WALLY Baptiste      melatonin  6 mg Oral HS PRN WALLY Gomez      methocarbamol  500 mg Oral Q6H PRN WALLY Baptiste      OLANZapine  5 mg Oral Q4H PRN Max 3/day Basilio Brown MD      Or    OLANZapine  2.5 mg Intramuscular Q4H PRN Max 3/day Basilio Brown MD      OLANZapine  5 mg Oral Q3H PRN Max 3/day Basilio Brown MD      Or    OLANZapine  5 mg Intramuscular Q3H PRN Max 3/day Basilio Brown MD      OLANZapine  2.5 mg Oral Q4H PRN Max 6/day Basilio Brown MD      polyethylene glycol  17 g Oral Daily PRN Basilio Brown MD      propranolol  10 mg Oral Q8H PRN Basilio Brown MD      senna-docusate sodium  2 tablet Oral After Dinner Dustin Mcallister MD      sertraline  150 mg Oral Daily WALLY Gomez      traZODone  50 mg Oral HS PRN Basilio Brown MD         Risks / Benefits of  Treatment:    Risks, benefits, and possible side effects of medications explained to patient and patient verbalizes understanding and agreement for treatment.    Subjective:    Behavior over the last 24 hours: unchanged.       Per staff, Franco remains calm and cooperative on the unit.  He is reporting improvement in cold symptoms.  There are have been no acute behavioral issues.    Franco was seen in his room today for psychiatric follow up.  He reports that he is feeling improved and has more energy during the day now that his cold is improving.  He reports that he will attend some groups today since he is feeling better.  He report some mild depression from prolonged stay on the unit.  He continues with poor hygiene and is not shaving his facial hair.  When asked he does report he is showering.  He denies any suicidal or homicidal ideation intent or plan.  He denies hallucinations and does not appear to be internally stimulated.      Sleep: normal  Appetite: normal  Medication side effects: No   ROS:  mild cough    Mental Status Evaluation:    Appearance:  disheveled, poor hygiene   Behavior:  pleasant, cooperative, calm   Speech:  normal rate and volume   Mood:  mildly depressed   Affect:  constricted   Thought Process:  goal directed   Associations: intact associations   Thought Content:  no overt delusions   Perceptual Disturbances: none   Risk Potential: Suicidal ideation - None  Homicidal ideation - None  Potential for aggression - No   Sensorium:  oriented to person, place, and time/date   Memory:  recent memory intact   Consciousness:  alert and awake   Attention/Concentration: attention span and concentration are age appropriate   Insight:  limited   Judgment: limited   Gait/Station: normal gait/station   Motor Activity: no abnormal movements     Vital signs in last 24 hours:    Temp:  [97.3 °F (36.3 °C)-97.5 °F (36.4 °C)] 97.3 °F (36.3 °C)  HR:  [71-80] 71  BP: (105-118)/(58) 105/58  Resp:  [16-18]  16  SpO2:  [95 %-98 %] 98 %  O2 Device: None (Room air)         Laboratory results: I have personally reviewed all pertinent laboratory/tests results    Results from the past 24 hours: No results found for this or any previous visit (from the past 24 hours).  Most Recent Labs:   Lab Results   Component Value Date    WBC 6.75 11/12/2024    RBC 5.06 11/12/2024    HGB 15.9 11/12/2024    HCT 46.2 11/12/2024     11/12/2024    RDW 12.8 11/12/2024    NEUTROABS 4.15 11/12/2024    SODIUM 139 11/12/2024    K 4.0 11/12/2024     11/12/2024    CO2 30 11/12/2024    BUN 17 11/12/2024    CREATININE 0.99 11/12/2024    GLUC 85 11/12/2024    CALCIUM 9.4 11/12/2024    AST 23 11/12/2024    ALT 32 11/12/2024    ALKPHOS 73 11/12/2024    TP 8.1 11/12/2024    ALB 4.4 11/12/2024    TBILI 0.54 11/12/2024    CHOLESTEROL 151 11/26/2024    HDL 39 (L) 11/26/2024    TRIG 170 (H) 11/26/2024    LDLCALC 78 11/26/2024    NONHDLC 112 11/26/2024    [id number] 2.532 11/12/2024    FREET4 0.56 (L) 09/10/2024    SYPHILISAB Non-reactive 05/15/2024       Suicide/Homicide Risk Assessment:    Risk of Harm to Self:   Nursing Suicide Risk Assessment Last 24 hours: C-SSRS Risk (Since Last Contact)  Calculated C-SSRS Risk Score (Since Last Contact): No Risk Indicated  Current Specific Risk Factors include: diagnosis of mood disorder  Protective Factors: no current suicidal ideation, ability to communicate with staff on the unit, able to contract for safety on the unit, taking medications as ordered on the unit, improved mood  Based on today's assessment, Franco presents the following risk of harm to self: low    Risk of Harm to Others:  Nursing Homicide Risk Assessment: Violence Risk to Others: Denies within past 6 months  Current Specific Risk Factors include: social difficulties  Protective Factors: no current homicidal ideation, able to communicate with staff on the unit, improved mood  Based on today's assessment, Franco presents the following  risk of harm to others: low    The following interventions are recommended: Behavioral Health checks for safety monitoring, continued hospitalization on locked unit    Progress Toward Goals: progressing, attends groups, participates in milieu therapy, depression is improving, poor adls    Counseling / Coordination of Care:    Total floor / unit time spent today 30 minutes. Greater than 50% of total time was spent with the patient and / or family counseling and / or coordination of care. A description of counseling / coordination of care:  Patient's progress discussed with staff in treatment team meeting.    Administrative Statements   I have spent a total time of 30 minutes in caring for this patient on the day of the visit/encounter including Diagnostic results, Documenting in the medical record, Reviewing / ordering tests, medicine, procedures  , Obtaining or reviewing history  , and Communicating with other healthcare professionals .    WALLY Gomez 01/10/25

## 2025-01-10 NOTE — PLAN OF CARE
Problem: Ineffective Coping  Goal: Participates in unit activities  Description: Interventions:  - Provide therapeutic environment   - Provide required programming   - Redirect inappropriate behaviors   Outcome: Progressing     Problem: Depression  Goal: Treatment Goal: Demonstrate behavioral control of depressive symptoms, verbalize feelings of improved mood/affect, and adopt new coping skills prior to discharge  Outcome: Progressing  Goal: Verbalize thoughts and feelings  Description: Interventions:  - Assess and re-assess patient's level of risk   - Engage patient in 1:1 interactions, daily, for a minimum of 15 minutes   - Encourage patient to express feelings, fears, frustrations, hopes   Outcome: Progressing  Goal: Refrain from harming self  Description: Interventions:  - Monitor patient closely, per order   - Supervise medication ingestion, monitor effects and side effects   Outcome: Progressing  Goal: Refrain from isolation  Description: Interventions:  - Develop a trusting relationship   - Encourage socialization   Outcome: Progressing     Problem: Anxiety  Goal: Anxiety is at manageable level  Description: Interventions:  - Assess and monitor patient's anxiety level.   - Monitor for signs and symptoms (heart palpitations, chest pain, shortness of breath, headaches, nausea, feeling jumpy, restlessness, irritable, apprehensive).   - Collaborate with interdisciplinary team and initiate plan and interventions as ordered.  - [place] patient to unit/surroundings  - Explain treatment plan  - Encourage participation in care  - Encourage verbalization of concerns/fears  - Identify coping mechanisms  - Assist in developing anxiety-reducing skills  - Administer/offer alternative therapies  - Limit or eliminate stimulants  Outcome: Progressing     Problem: DISCHARGE PLANNING - CARE MANAGEMENT  Goal: Discharge to post-acute care or home with appropriate resources  Description: INTERVENTIONS:  - Conduct assessment to  determine patient/family and health care team treatment goals, and need for post-acute services based on payer coverage, community resources, and patient preferences, and barriers to discharge  - Address psychosocial, clinical, and financial barriers to discharge as identified in assessment in conjunction with the patient/family and health care team  - Arrange appropriate level of post-acute services according to patient’s   needs and preference and payer coverage in collaboration with the physician and health care team  - Communicate with and update the patient/family, physician, and health care team regarding progress on the discharge plan  - Arrange appropriate transportation to post-acute venues  Outcome: Progressing     Problem: Knowledge Deficit  Goal: Patient/family/caregiver demonstrates understanding of disease process, treatment plan, medications, and discharge instructions  Description: Complete learning assessment and assess knowledge base.  Interventions:  - Provide teaching at level of understanding  - Provide teaching via preferred learning methods  Outcome: Progressing     Problem: SLEEP DISTURBANCE  Goal: Will exhibit normal sleeping pattern  Description: Interventions:  -  Assess the patients sleep pattern, noting recent changes  - Administer medication as ordered  - Decrease environmental stimuli, including noise, as appropriate during the night  - Encourage the patient to actively participate in unit groups and or exercise during the day to enhance ability to achieve adequate sleep at night  - Assess the patient, in the morning, encouraging a description of sleep experience  Outcome: Progressing

## 2025-01-11 PROCEDURE — 99232 SBSQ HOSP IP/OBS MODERATE 35: CPT | Performed by: PSYCHIATRY & NEUROLOGY

## 2025-01-11 RX ADMIN — SENNOSIDES AND DOCUSATE SODIUM 2 TABLET: 50; 8.6 TABLET ORAL at 17:24

## 2025-01-11 RX ADMIN — Medication 2500 UNITS: at 08:56

## 2025-01-11 RX ADMIN — ARIPIPRAZOLE 5 MG: 5 TABLET ORAL at 08:56

## 2025-01-11 RX ADMIN — FAMOTIDINE 20 MG: 20 TABLET ORAL at 08:56

## 2025-01-11 RX ADMIN — FAMOTIDINE 20 MG: 20 TABLET ORAL at 17:24

## 2025-01-11 RX ADMIN — SERTRALINE HYDROCHLORIDE 150 MG: 100 TABLET ORAL at 08:56

## 2025-01-11 RX ADMIN — LEVOTHYROXINE SODIUM 37.5 MCG: 125 TABLET ORAL at 06:11

## 2025-01-11 RX ADMIN — ATORVASTATIN CALCIUM 10 MG: 10 TABLET, FILM COATED ORAL at 17:24

## 2025-01-11 RX ADMIN — CYANOCOBALAMIN TAB 1000 MCG 1000 MCG: 1000 TAB at 08:56

## 2025-01-11 NOTE — PLAN OF CARE
Problem: Ineffective Coping  Goal: Participates in unit activities  Description: Interventions:  - Provide therapeutic environment   - Provide required programming   - Redirect inappropriate behaviors   1/11/2025 0412 by Lindy Sandra RN  Outcome: Progressing  1/11/2025 0412 by Lindy Sandra RN  Outcome: Not Progressing     Problem: Depression  Goal: Treatment Goal: Demonstrate behavioral control of depressive symptoms, verbalize feelings of improved mood/affect, and adopt new coping skills prior to discharge  1/11/2025 0412 by Lindy Sandra RN  Outcome: Progressing  1/11/2025 0412 by Lindy Sandra RN  Outcome: Not Progressing  Goal: Verbalize thoughts and feelings  Description: Interventions:  - Assess and re-assess patient's level of risk   - Engage patient in 1:1 interactions, daily, for a minimum of 15 minutes   - Encourage patient to express feelings, fears, frustrations, hopes   1/11/2025 0412 by Lindy Sandra RN  Outcome: Progressing  1/11/2025 0412 by Lindy Sandra RN  Outcome: Not Progressing  Goal: Refrain from harming self  Description: Interventions:  - Monitor patient closely, per order   - Supervise medication ingestion, monitor effects and side effects   1/11/2025 0412 by Lindy Sandra RN  Outcome: Progressing  1/11/2025 0412 by Lindy Sandra RN  Outcome: Not Progressing  Goal: Refrain from isolation  Description: Interventions:  - Develop a trusting relationship   - Encourage socialization   1/11/2025 0412 by Lindy Sandra RN  Outcome: Progressing  1/11/2025 0412 by Lindy Sandra RN  Outcome: Not Progressing     Problem: Anxiety  Goal: Anxiety is at manageable level  Description: Interventions:  - Assess and monitor patient's anxiety level.   - Monitor for signs and symptoms (heart palpitations, chest pain, shortness of breath, headaches, nausea, feeling jumpy, restlessness, irritable, apprehensive).   - Collaborate with interdisciplinary team and initiate plan and interventions as ordered.  -  [place] patient to unit/surroundings  - Explain treatment plan  - Encourage participation in care  - Encourage verbalization of concerns/fears  - Identify coping mechanisms  - Assist in developing anxiety-reducing skills  - Administer/offer alternative therapies  - Limit or eliminate stimulants  1/11/2025 0412 by Lindy Sandra RN  Outcome: Progressing  1/11/2025 0412 by Lindy Sandra RN  Outcome: Not Progressing     Problem: DISCHARGE PLANNING - CARE MANAGEMENT  Goal: Discharge to post-acute care or home with appropriate resources  Description: INTERVENTIONS:  - Conduct assessment to determine patient/family and health care team treatment goals, and need for post-acute services based on payer coverage, community resources, and patient preferences, and barriers to discharge  - Address psychosocial, clinical, and financial barriers to discharge as identified in assessment in conjunction with the patient/family and health care team  - Arrange appropriate level of post-acute services according to patient’s   needs and preference and payer coverage in collaboration with the physician and health care team  - Communicate with and update the patient/family, physician, and health care team regarding progress on the discharge plan  - Arrange appropriate transportation to post-acute venues  1/11/2025 0412 by Lindy Sandra RN  Outcome: Progressing  1/11/2025 0412 by Lindy Sandra RN  Outcome: Not Progressing     Problem: Knowledge Deficit  Goal: Patient/family/caregiver demonstrates understanding of disease process, treatment plan, medications, and discharge instructions  Description: Complete learning assessment and assess knowledge base.  Interventions:  - Provide teaching at level of understanding  - Provide teaching via preferred learning methods  1/11/2025 0412 by Lindy Sandra RN  Outcome: Progressing  1/11/2025 0412 by Lindy Sandra RN  Outcome: Not Progressing     Problem: SLEEP DISTURBANCE  Goal: Will exhibit normal  sleeping pattern  Description: Interventions:  -  Assess the patients sleep pattern, noting recent changes  - Administer medication as ordered  - Decrease environmental stimuli, including noise, as appropriate during the night  - Encourage the patient to actively participate in unit groups and or exercise during the day to enhance ability to achieve adequate sleep at night  - Assess the patient, in the morning, encouraging a description of sleep experience  1/11/2025 0412 by Lindy Sandra RN  Outcome: Progressing  1/11/2025 0412 by Lindy Sandra RN  Outcome: Not Progressing

## 2025-01-11 NOTE — NURSING NOTE
Patient is isolative to room, only out for needs. Patient depressed affect and disheveled. Patient compliant with scheduled medications. Patient denies SI/AVH. Q15 observation maintained.

## 2025-01-11 NOTE — ASSESSMENT & PLAN NOTE
"Patient continues to do well and the plan is to continue with the current treatment plan as mentioned below with no changes.    Patient continues to be pending group home placement. Otherwise no clinical significant change.  [place] and SW able [place] have [place] [facility] [place]. Waiting for follow [place] [facility] [place].    Medication regimen as follows, no changes as of 1/10/2025:  Abilify 5 mg daily as mood adjunct   Zoloft 150 mg daily for depression and anxiety  Continue to encourage participation in group therapy, milieu therapy and occupational therapy.  Continue to assess for side effects of medications.  Continue collaboration with SLIM for medical co-morbidities as indicated.  Continue discussion with CM/SW to assist with obtaining collateral, disposition planning, and the implementation of patient-centered individualized plan of care.  Continue frequent safety checks and vitals per unit protocol.    Risks, benefits and possible side effects of Medications: Risks, benefits, and possible side effects of medications have previously been explained. No new medications at this time.      Legal status: 201    Disposition: to be determined, pending SOAR application for potential group home placement. OT Cognitive Evaluation completed: \"Pt would benefit from discharge to a supportive environment that can provide checks for safety and compliance with IADLs. \"   Although patient's mood has stabilized, they are currently awaiting group home placement.  Their ability to recognize safety hazards take medications and participate in IADLs are significantly impaired by their cognitive deficits compounded by their chronic mental health needs and would be at risk for significant decompensation without the structure and support of a group home setting.      No associated orders from this encounter found during lookback period of 72 hours.  "

## 2025-01-11 NOTE — NURSING NOTE
Patient is calm and cooperative on the unit. Isolative to bedroom. Denies SI, HI, SIB, auditory and visual hallucinations. Patient is med and meal compliant. Denies any unmet needs at this time. Maintaining Q15 safety checks.

## 2025-01-12 VITALS
RESPIRATION RATE: 16 BRPM | OXYGEN SATURATION: 97 % | DIASTOLIC BLOOD PRESSURE: 65 MMHG | WEIGHT: 290 LBS | TEMPERATURE: 97.9 F | HEIGHT: 72 IN | SYSTOLIC BLOOD PRESSURE: 114 MMHG | HEART RATE: 65 BPM | BODY MASS INDEX: 39.28 KG/M2

## 2025-01-12 PROCEDURE — 99232 SBSQ HOSP IP/OBS MODERATE 35: CPT | Performed by: PSYCHIATRY & NEUROLOGY

## 2025-01-12 RX ADMIN — SENNOSIDES AND DOCUSATE SODIUM 2 TABLET: 50; 8.6 TABLET ORAL at 17:20

## 2025-01-12 RX ADMIN — ARIPIPRAZOLE 5 MG: 5 TABLET ORAL at 08:55

## 2025-01-12 RX ADMIN — CYANOCOBALAMIN TAB 1000 MCG 1000 MCG: 1000 TAB at 08:55

## 2025-01-12 RX ADMIN — FAMOTIDINE 20 MG: 20 TABLET ORAL at 17:20

## 2025-01-12 RX ADMIN — FAMOTIDINE 20 MG: 20 TABLET ORAL at 08:55

## 2025-01-12 RX ADMIN — SERTRALINE HYDROCHLORIDE 150 MG: 100 TABLET ORAL at 08:55

## 2025-01-12 RX ADMIN — LEVOTHYROXINE SODIUM 37.5 MCG: 125 TABLET ORAL at 06:23

## 2025-01-12 RX ADMIN — Medication 2500 UNITS: at 08:55

## 2025-01-12 RX ADMIN — ATORVASTATIN CALCIUM 10 MG: 10 TABLET, FILM COATED ORAL at 17:20

## 2025-01-12 NOTE — PROGRESS NOTES
"Progress Note - Behavioral Health   Name: Franco Roberson 39 y.o. male I MRN: [id number]  Unit/Bed#: -01 I Date of Admission: 5/14/2024   Date of Service: 1/12/2025 I Hospital Day: 243        Assessment & Plan  MDD (major depressive disorder), recurrent severe, without psychosis (HCC)  Patient continues to do well and the plan is to continue with the current treatment plan as mentioned below with no changes.    Patient continues to be pending group home placement. Otherwise no clinical significant change.  Pt and SW able to have meeting w/ [facility] on 12/13. Waiting for follow up from [facility] by mail.    Medication regimen as follows, no changes as of 1/10/2025:  Abilify 5 mg daily as mood adjunct   Zoloft 150 mg daily for depression and anxiety  Continue to encourage participation in group therapy, milieu therapy and occupational therapy.  Continue to assess for side effects of medications.  Continue collaboration with SLIM for medical co-morbidities as indicated.  Continue discussion with CM/SW to assist with obtaining collateral, disposition planning, and the implementation of patient-centered individualized plan of care.  Continue frequent safety checks and vitals per unit protocol.    Risks, benefits and possible side effects of Medications: Risks, benefits, and possible side effects of medications have previously been explained. No new medications at this time.      Legal status: 201    Disposition: to be determined, pending SOAR application for potential group home placement. OT Cognitive Evaluation completed: \"Pt would benefit from discharge to a supportive environment that can provide checks for safety and compliance with IADLs. \"   Although patient's mood has stabilized, they are currently awaiting group home placement.  Their ability to recognize safety hazards take medications and participate in IADLs are significantly impaired by their cognitive deficits compounded by their chronic mental health needs and " would be at risk for significant decompensation without the structure and support of a group home setting.      No associated orders from this encounter found during lookback period of 72 hours.  Autism spectrum disorder  Continue supportive care    No associated orders from this encounter found during lookback period of 72 hours.         Additional Recommendations:  Behavioral Health checks for safety monitoring.  Continue treatment with group therapy, milieu therapy and occupational therapy.  Medical management per SLIM.  Discharge and disposition planning.    ----------------------------------------      Subjective: Per nursing report patient has been doing well overnight, denying any concerns.  Currently denies any SI, HI, AVH, paranoid thoughts, anxiety, depression.  Constipation is no longer an issue, appetite is stable, and mood is stable in his own subjective measurement.  Father was commotion last night in the hallway, he was able to go right back to sleep following this and is agreeable with the plan moving forward, questioning discharge time and understands that his primary psychiatric team will come back tomorrow and is agreeable with it.    Behavior over the last 24 hours:  unchanged  Sleep: normal  Appetite: normal  Medication side effects: No  ROS: no complaints  Appearance:  disheveled, poor hygiene   Behavior:  cooperative, responds to redirection   Speech:  slow, soft   Mood:  depressed, dysphoric   Affect:  constricted   Thought Process:  perseverative, negative thinking   Associations: perseveration   Thought Content:  no overt delusions, negative thoughts   Perceptual Disturbances: denies auditory hallucinations when asked, does not appear responding to internal stimuli   Risk Potential: Suicidal ideation - None  Homicidal ideation - None  Potential for aggression - No   Sensorium:  oriented to person, place, and time/date   Memory:  recent and remote memory grossly intact   Consciousness:   alert and awake   Attention/Concentration: attention span and concentration appear shorter than expected for age   Insight:  limited   Judgment: limited   Gait/Station: normal gait/station, normal balance   Motor Activity: no abnormal movements         Medications: all current active meds have been reviewed.  Current Facility-Administered Medications   Medication Dose Route Frequency Provider Last Rate    acetaminophen  650 mg Oral Q6H PRN Basilio Brown MD      acetaminophen  650 mg Oral Q4H PRN Basilio Brown MD      acetaminophen  975 mg Oral Q6H PRN Basilio Brown MD      aluminum-magnesium hydroxide-simethicone  30 mL Oral Q4H PRN Basilio Brown MD      ARIPiprazole  5 mg Oral Daily Basilio Panda MD      Artificial Tears  1 drop Both Eyes Q3H PRN Basilio Brown MD      atorvastatin  10 mg Oral Daily With Dinner WALLY Baptiste      benztropine  1 mg Intramuscular Q4H PRN Max 6/day Basilio Brown MD      benztropine  1 mg Oral Q4H PRN Max 6/day Basilio Brown MD      Cholecalciferol  2,500 Units Oral Daily Dustin Mcallister MD      cyanocobalamin  1,000 mcg Oral Daily WALLY Baptiste      Diclofenac Sodium  2 g Topical 4x Daily PRN WALLY Baptiste      hydrOXYzine HCL  50 mg Oral Q6H PRN Max 4/day Basilio Brown MD      Or    diphenhydrAMINE  50 mg Intramuscular Q6H PRN Basilio Brown MD      diphenhydrAMINE-zinc acetate   Topical Daily PRN Raj Guerrero MD      famotidine  20 mg Oral BID WALLY Baptiste      hydrOXYzine HCL  100 mg Oral Q6H PRN Max 4/day Basilio Brown MD      Or    LORazepam  2 mg Intramuscular Q6H PRN Basilio Brown MD      hydrOXYzine HCL  25 mg Oral Q6H PRN Max 4/day Basilio Brown MD      levothyroxine  37.5 mcg Oral Early Morning WALLY Baptiste      melatonin  6 mg Oral HS PRN WALLY Gomez      methocarbamol  500 mg Oral Q6H PRN Laura Clark  WALLY Ashford      OLANZapine  5 mg Oral Q4H PRN Max 3/day Basilio Brown MD      Or    OLANZapine  2.5 mg Intramuscular Q4H PRN Max 3/day Basilio Brown MD      OLANZapine  5 mg Oral Q3H PRN Max 3/day Basilio Brown MD      Or    OLANZapine  5 mg Intramuscular Q3H PRN Max 3/day Basilio Brown MD      OLANZapine  2.5 mg Oral Q4H PRN Max 6/day Basilio Brown MD      polyethylene glycol  17 g Oral Daily PRN Basilio Brown MD      propranolol  10 mg Oral Q8H PRN Basilio Brown MD      senna-docusate sodium  2 tablet Oral After Dinner Dustin Mcallister MD      sertraline  150 mg Oral Daily WALLY Gomez      traZODone  50 mg Oral HS PRN Basilio Brown MD         Labs: I have personally reviewed all pertinent laboratory/tests results  Results from the past 24 hours: No results found for this or any previous visit (from the past 24 hours).    Progress Toward Goals: progressing    Risks / Benefits of Treatment:    Risks, benefits, and possible side effects of medications explained to patient and patient verbalizes understanding and agreement for treatment.    Counseling / Coordination of Care:    Total floor / unit time spent today 30 minutes. Greater than 50% of total time was spent with the patient and / or family counseling and / or coordination of care. A description of counseling / coordination of care:  Patient's progress discussed with staff in treatment team meeting.  Medications, treatment progress and treatment plan reviewed with patient.  Reassurance and supportive therapy provided.  Encouraged participation in milieu and group therapy on the unit.    Tom Martinez MD 01/12/25

## 2025-01-12 NOTE — PLAN OF CARE
Problem: Depression  Goal: Treatment Goal: Demonstrate behavioral control of depressive symptoms, verbalize feelings of improved mood/affect, and adopt new coping skills prior to discharge  Outcome: Progressing  Goal: Verbalize thoughts and feelings  Description: Interventions:  - Assess and re-assess patient's level of risk   - Engage patient in 1:1 interactions, daily, for a minimum of 15 minutes   - Encourage patient to express feelings, fears, frustrations, hopes   Outcome: Progressing  Goal: Refrain from harming self  Description: Interventions:  - Monitor patient closely, per order   - Supervise medication ingestion, monitor effects and side effects   Outcome: Progressing  Goal: Refrain from isolation  Description: Interventions:  - Develop a trusting relationship   - Encourage socialization   Outcome: Progressing     Problem: Anxiety  Goal: Anxiety is at manageable level  Description: Interventions:  - Assess and monitor patient's anxiety level.   - Monitor for signs and symptoms (heart palpitations, chest pain, shortness of breath, headaches, nausea, feeling jumpy, restlessness, irritable, apprehensive).   - Collaborate with interdisciplinary team and initiate plan and interventions as ordered.  - [place] patient to unit/surroundings  - Explain treatment plan  - Encourage participation in care  - Encourage verbalization of concerns/fears  - Identify coping mechanisms  - Assist in developing anxiety-reducing skills  - Administer/offer alternative therapies  - Limit or eliminate stimulants  Outcome: Progressing     Problem: DISCHARGE PLANNING - CARE MANAGEMENT  Goal: Discharge to post-acute care or home with appropriate resources  Description: INTERVENTIONS:  - Conduct assessment to determine patient/family and health care team treatment goals, and need for post-acute services based on payer coverage, community resources, and patient preferences, and barriers to discharge  - Address psychosocial, clinical,  and financial barriers to discharge as identified in assessment in conjunction with the patient/family and health care team  - Arrange appropriate level of post-acute services according to patient’s   needs and preference and payer coverage in collaboration with the physician and health care team  - Communicate with and update the patient/family, physician, and health care team regarding progress on the discharge plan  - Arrange appropriate transportation to post-acute venues  Outcome: Progressing     Problem: Knowledge Deficit  Goal: Patient/family/caregiver demonstrates understanding of disease process, treatment plan, medications, and discharge instructions  Description: Complete learning assessment and assess knowledge base.  Interventions:  - Provide teaching at level of understanding  - Provide teaching via preferred learning methods  Outcome: Progressing     Problem: SLEEP DISTURBANCE  Goal: Will exhibit normal sleeping pattern  Description: Interventions:  -  Assess the patients sleep pattern, noting recent changes  - Administer medication as ordered  - Decrease environmental stimuli, including noise, as appropriate during the night  - Encourage the patient to actively participate in unit groups and or exercise during the day to enhance ability to achieve adequate sleep at night  - Assess the patient, in the morning, encouraging a description of sleep experience  Outcome: Progressing     Problem: Ineffective Coping  Goal: Participates in unit activities  Description: Interventions:  - Provide therapeutic environment   - Provide required programming   - Redirect inappropriate behaviors   Outcome: Not Progressing

## 2025-01-12 NOTE — NURSING NOTE
Patient has been more visible in later part of the evening, walking in the hallway. He was informing staff that a peer is opening his bedroom door. Apparently this has been going on for a couple of days.  Patient continues to deny S.I.H.I.A/H V/H

## 2025-01-12 NOTE — NURSING NOTE
Patient is calm and cooperative on the unit. Isolative to bedroom. Denies SI, HI, SIB, auditory and visual hallucinations at this time. Med and meal compliant. Encouraged increased group participation and visibility on the unit. Maintaining Q15 safety checks.

## 2025-01-12 NOTE — ASSESSMENT & PLAN NOTE
"Patient continues to do well and the plan is to continue with the current treatment plan as mentioned below with no changes.    Patient continues to be pending group home placement. Otherwise no clinical significant change.  Pt and SW able to have [place] [facility] [place]. Waiting for follow up [place] [facility] [place].    Medication regimen as follows, no changes as of 1/10/2025:  Abilify 5 mg daily as mood adjunct   Zoloft 150 mg daily for depression and anxiety  Continue to encourage participation in group therapy, milieu therapy and occupational therapy.  Continue to assess for side effects of medications.  Continue collaboration with [facility] for medical co-morbidities as indicated.  Continue discussion with CM/SW to assist with obtaining collateral, disposition planning, and the implementation of patient-centered individualized plan of care.  Continue frequent safety checks and vitals per unit protocol.    Risks, benefits and possible side effects of Medications: Risks, benefits, and possible side effects of medications have previously been explained. No new medications at this time.      Legal status: 201    Disposition: to be determined, pending SOAR application for potential group home placement. OT Cognitive Evaluation completed: \"Pt would benefit from discharge to a supportive environment that can provide checks for safety and compliance with IADLs. \"   Although patient's mood has stabilized, they are currently awaiting group home placement.  Their ability to recognize safety hazards take medications and participate in IADLs are significantly impaired by their cognitive deficits compounded by their chronic mental health needs and would be at risk for significant decompensation without the structure and support of a group home setting.      No associated orders from this encounter found during lookback period of 72 hours.  "

## 2025-01-13 PROCEDURE — 99232 SBSQ HOSP IP/OBS MODERATE 35: CPT | Performed by: PSYCHIATRY & NEUROLOGY

## 2025-01-13 RX ADMIN — FAMOTIDINE 20 MG: 20 TABLET ORAL at 08:16

## 2025-01-13 RX ADMIN — SERTRALINE HYDROCHLORIDE 150 MG: 100 TABLET ORAL at 08:16

## 2025-01-13 RX ADMIN — ATORVASTATIN CALCIUM 10 MG: 10 TABLET, FILM COATED ORAL at 17:33

## 2025-01-13 RX ADMIN — ARIPIPRAZOLE 5 MG: 5 TABLET ORAL at 08:16

## 2025-01-13 RX ADMIN — FAMOTIDINE 20 MG: 20 TABLET ORAL at 17:33

## 2025-01-13 RX ADMIN — Medication 2500 UNITS: at 08:16

## 2025-01-13 RX ADMIN — SENNOSIDES AND DOCUSATE SODIUM 2 TABLET: 50; 8.6 TABLET ORAL at 17:33

## 2025-01-13 RX ADMIN — CYANOCOBALAMIN TAB 1000 MCG 1000 MCG: 1000 TAB at 08:16

## 2025-01-13 RX ADMIN — LEVOTHYROXINE SODIUM 37.5 MCG: 125 TABLET ORAL at 06:40

## 2025-01-13 NOTE — SOCIAL WORK
Cm called [facility] at 11: 30 am and have been on hold for two hours in attempt to reach someone regarding Pt's case.   They reported they did not get documentation with [employer] account information.     [facility] answered after 4 hours. Pt was able to speak with them. [facility] reported they never received any fax that was sen on 12/13 of Pt's [employer] account. Pt asked for a fax number to send the proof of having [employer] account with the 18,000 dollars.   Cm faxed the document over 3 times with Pt's full SSN on it.     Cm and Pt called [employer] and confirmed Pt has 900 dollars in his account currently.

## 2025-01-13 NOTE — PLAN OF CARE
Problem: Depression  Goal: Treatment Goal: Demonstrate behavioral control of depressive symptoms, verbalize feelings of improved mood/affect, and adopt new coping skills prior to discharge  Outcome: Progressing  Goal: Verbalize thoughts and feelings  Description: Interventions:  - Assess and re-assess patient's level of risk   - Engage patient in 1:1 interactions, daily, for a minimum of 15 minutes   - Encourage patient to express feelings, fears, frustrations, hopes   Outcome: Progressing  Goal: Refrain from harming self  Description: Interventions:  - Monitor patient closely, per order   - Supervise medication ingestion, monitor effects and side effects   Outcome: Progressing  Goal: Refrain from isolation  Description: Interventions:  - Develop a trusting relationship   - Encourage socialization   Outcome: Progressing     Problem: Anxiety  Goal: Anxiety is at manageable level  Description: Interventions:  - Assess and monitor patient's anxiety level.   - Monitor for signs and symptoms (heart palpitations, chest pain, shortness of breath, headaches, nausea, feeling jumpy, restlessness, irritable, apprehensive).   - Collaborate with interdisciplinary team and initiate plan and interventions as ordered.  - [place] patient to unit/surroundings  - Explain treatment plan  - Encourage participation in care  - Encourage verbalization of concerns/fears  - Identify coping mechanisms  - Assist in developing anxiety-reducing skills  - Administer/offer alternative therapies  - Limit or eliminate stimulants  Outcome: Progressing     Problem: DISCHARGE PLANNING - CARE MANAGEMENT  Goal: Discharge to post-acute care or home with appropriate resources  Description: INTERVENTIONS:  - Conduct assessment to determine patient/family and health care team treatment goals, and need for post-acute services based on payer coverage, community resources, and patient preferences, and barriers to discharge  - Address psychosocial, clinical,  and financial barriers to discharge as identified in assessment in conjunction with the patient/family and health care team  - Arrange appropriate level of post-acute services according to patient’s   needs and preference and payer coverage in collaboration with the physician and health care team  - Communicate with and update the patient/family, physician, and health care team regarding progress on the discharge plan  - Arrange appropriate transportation to post-acute venues  Outcome: Progressing     Problem: Knowledge Deficit  Goal: Patient/family/caregiver demonstrates understanding of disease process, treatment plan, medications, and discharge instructions  Description: Complete learning assessment and assess knowledge base.  Interventions:  - Provide teaching at level of understanding  - Provide teaching via preferred learning methods  Outcome: Progressing     Problem: SLEEP DISTURBANCE  Goal: Will exhibit normal sleeping pattern  Description: Interventions:  -  Assess the patients sleep pattern, noting recent changes  - Administer medication as ordered  - Decrease environmental stimuli, including noise, as appropriate during the night  - Encourage the patient to actively participate in unit groups and or exercise during the day to enhance ability to achieve adequate sleep at night  - Assess the patient, in the morning, encouraging a description of sleep experience  Outcome: Progressing     Problem: Ineffective Coping  Goal: Participates in unit activities  Description: Interventions:  - Provide therapeutic environment   - Provide required programming   - Redirect inappropriate behaviors   Outcome: Not Progressing

## 2025-01-13 NOTE — NURSING NOTE
Pt is visible on the unit for meals and needs. Denies SI/HI/AH/VH. Hopeful for discharge. Encouraged to attend groups. Denies any unmet needs or complaints.

## 2025-01-13 NOTE — ASSESSMENT & PLAN NOTE
"Patient continues to do well and the plan is to continue with the current treatment plan as mentioned below with no changes.    Patient continues to be pending group home placement. Otherwise no clinical significant change.  [place] and SW [place] [facility] [place] [facility] [place].    Medication regimen as follows, no changes as of 1/13/2025:  Abilify 5 mg daily as mood adjunct   Zoloft 150 mg daily for depression and anxiety  Continue to encourage participation in group therapy, milieu therapy and occupational therapy.  Continue to assess for side effects of medications.  Continue collaboration with SLIM for medical co-morbidities as indicated.  Continue discussion with CM/SW to assist with obtaining collateral, disposition planning, and the implementation of patient-centered individualized plan of care.  Continue frequent safety checks and vitals per unit protocol.    Risks, benefits and possible side effects of Medications: Risks, benefits, and possible side effects of medications have previously been explained. No new medications at this time.      Legal status: 201    Disposition: to be determined, pending SOAR application for potential group home placement. OT Cognitive Evaluation completed: \"Pt would benefit from discharge to a supportive environment that can provide checks for safety and compliance with IADLs. \"   Although patient's mood has stabilized, they are currently awaiting group home placement.  Their ability to recognize safety hazards take medications and participate in IADLs are significantly impaired by their cognitive deficits compounded by their chronic mental health needs and would be at risk for significant decompensation without the structure and support of a group home setting.      No associated orders from this encounter found during lookback period of 72 hours.  "

## 2025-01-13 NOTE — PROGRESS NOTES
"Progress Note - Behavioral Health   Name: Franco Roberson 39 y.o. male I MRN: [id number]   Unit/Bed#: -01 I Date of Admission: 5/14/2024   Date of Service: 1/13/2025 I Hospital Day: 244         Assessment & Plan  MDD (major depressive disorder), recurrent severe, without psychosis (HCC)  Patient continues to do well and the plan is to continue with the current treatment plan as mentioned below with no changes.    Patient continues to be pending group home placement. Otherwise no clinical significant change.  Pt and SW able to have meeting w/ [facility] on 12/13. Waiting for follow up from [facility] by mail.    Medication regimen as follows, no changes as of 1/13/2025:  Abilify 5 mg daily as mood adjunct   Zoloft 150 mg daily for depression and anxiety  Continue to encourage participation in group therapy, milieu therapy and occupational therapy.  Continue to assess for side effects of medications.  Continue collaboration with SLIM for medical co-morbidities as indicated.  Continue discussion with CM/SW to assist with obtaining collateral, disposition planning, and the implementation of patient-centered individualized plan of care.  Continue frequent safety checks and vitals per unit protocol.    Risks, benefits and possible side effects of Medications: Risks, benefits, and possible side effects of medications have previously been explained. No new medications at this time.      Legal status: 201    Disposition: to be determined, pending SOAR application for potential group home placement. OT Cognitive Evaluation completed: \"Pt would benefit from discharge to a supportive environment that can provide checks for safety and compliance with IADLs. \"   Although patient's mood has stabilized, they are currently awaiting group home placement.  Their ability to recognize safety hazards take medications and participate in IADLs are significantly impaired by their cognitive deficits compounded by their chronic mental health needs and " would be at risk for significant decompensation without the structure and support of a group home setting.      No associated orders from this encounter found during lookback period of 72 hours.  Autism spectrum disorder  Continue supportive care    No associated orders from this encounter found during lookback period of 72 hours.          Recommended Treatment:     Treatment plan and medication changes discussed and per the attending physician the plan is:     1.Continue with group therapy, milieu therapy and occupational therapy  2.Behavioral Health checks every 15 minutes  3.Continue frequent safety checks and vitals per unit protocol  4.Continue with SLIM medical management as indicated  5.Continue with current medication regimen  6.Will review labs in the a.m.  7.Disposition Planning: Discharge planning and efforts remain ongoing     Planned medication and treatment changes:    All current active medications have been reviewed  Encourage group therapy, milieu therapy and occupational therapy  Behavioral Health checks for safety monitoring  Continue treatment with group therapy, milieu therapy and occupational therapy  Placement pending at [facility]  This patient is stable and ready for discharge, however, even at baseline, patient continues with poor insight, judgement and coping that pose a risk to patient in a homeless shelter, or otherwise unsupervised setting. Case management is assertively/actively working on securing the necessary level of care.  Continue current medications:    Current medications:  Current Facility-Administered Medications   Medication Dose Route Frequency Provider Last Rate    acetaminophen  650 mg Oral Q6H PRN Basilio Brown MD      acetaminophen  650 mg Oral Q4H PRN Basilio Brown MD      acetaminophen  975 mg Oral Q6H PRN Basilio Brown MD      aluminum-magnesium hydroxide-simethicone  30 mL Oral Q4H PRN Basilio Brown MD      ARIPiprazole  5 mg Oral Daily  Basilio Panda MD      Artificial Tears  1 drop Both Eyes Q3H PRN Basilio Brown MD      atorvastatin  10 mg Oral Daily With Dinner WALLY Baptiste      benztropine  1 mg Intramuscular Q4H PRN Max 6/day Basilio Brown MD      benztropine  1 mg Oral Q4H PRN Max 6/day Basilio Brown MD      Cholecalciferol  2,500 Units Oral Daily Dustin Mcallister MD      cyanocobalamin  1,000 mcg Oral Daily WALLY Baptiste      Diclofenac Sodium  2 g Topical 4x Daily PRN WALLY Baptiste      hydrOXYzine HCL  50 mg Oral Q6H PRN Max 4/day Basilio Brown MD      Or    diphenhydrAMINE  50 mg Intramuscular Q6H PRN Basilio Brown MD      diphenhydrAMINE-zinc acetate   Topical Daily PRN Raj Guerrero MD      famotidine  20 mg Oral BID WALLY Baptiste      hydrOXYzine HCL  100 mg Oral Q6H PRN Max 4/day Basilio Brown MD      Or    LORazepam  2 mg Intramuscular Q6H PRN Basilio Brown MD      hydrOXYzine HCL  25 mg Oral Q6H PRN Max 4/day Basilio Brown MD      levothyroxine  37.5 mcg Oral Early Morning WALLY Baptiste      melatonin  6 mg Oral HS PRN WALLY Gomez      methocarbamol  500 mg Oral Q6H PRN WALLY Baptiste      OLANZapine  5 mg Oral Q4H PRN Max 3/day Basilio Brown MD      Or    OLANZapine  2.5 mg Intramuscular Q4H PRN Max 3/day Basilio Brown MD      OLANZapine  5 mg Oral Q3H PRN Max 3/day Basilio Brown MD      Or    OLANZapine  5 mg Intramuscular Q3H PRN Max 3/day Basilio Brown MD      OLANZapine  2.5 mg Oral Q4H PRN Max 6/day Basilio Brown MD      polyethylene glycol  17 g Oral Daily PRN Basilio Brown MD      propranolol  10 mg Oral Q8H PRN Basilio Brown MD      senna-docusate sodium  2 tablet Oral After Dinner Dustin Mcallister MD      sertraline  150 mg Oral Daily WALLY Gomez      traZODone  50 mg Oral HS PRN Basilio Brown MD    "      Risks / Benefits of Treatment:    Risks, benefits, and possible side effects of medications explained to patient and patient verbalizes understanding and agreement for treatment.    Subjective:    Behavior over the last 24 hours: unchanged.     Per staff, Franco remains calm and cooperative with staff.  He complained about some of the noise on the unit over the weekend.  He remains compliant with his meals and medications.      Franco was seen in his room today for psychiatric follow up.  He reports that his mood is \"okay'  He reports some depression related to ongoing hospitalization.  He is hopeful that his SS issue is resolved soon so he can discharge.  He reports that his is showering but is malodorous.  He adamantly denies suicidal or homicidal ideation intent or plan.  He denies any auditory or visual hallucinations and does not present as internally preoccupied.  He reports improvement in cold symptoms and he is sleeping better    Sleep: slept better  Appetite: normal  Medication side effects: No   ROS: no complaints    Mental Status Evaluation:    Appearance:  poor hygiene   Behavior:  pleasant, cooperative   Speech:  normal rate and volume   Mood:  mildly depressed   Affect:  constricted   Thought Process:  goal directed, concrete   Associations: concrete associations   Thought Content:  no overt delusions   Perceptual Disturbances: none   Risk Potential: Suicidal ideation - None  Homicidal ideation - None  Potential for aggression - No   Sensorium:  oriented to person, place, and time/date   Memory:  recent memory intact   Consciousness:  alert and awake   Attention/Concentration: attention span and concentration are age appropriate   Insight:  limited   Judgment: limited   Gait/Station: normal gait/station   Motor Activity: no abnormal movements     Vital signs in last 24 hours:    Temp:  [97.9 °F (36.6 °C)-98.4 °F (36.9 °C)] 98.4 °F (36.9 °C)  HR:  [65-80] 80  BP: (114-122)/(58-65) 122/58  Resp:  " [16] 16  SpO2:  [97 %] 97 %  O2 Device: None (Room air)         Laboratory results: I have personally reviewed all pertinent laboratory/tests results    Results from the past 24 hours: No results found for this or any previous visit (from the past 24 hours).  Most Recent Labs:   Lab Results   Component Value Date    WBC 6.75 11/12/2024    RBC 5.06 11/12/2024    HGB 15.9 11/12/2024    HCT 46.2 11/12/2024     11/12/2024    RDW 12.8 11/12/2024    NEUTROABS 4.15 11/12/2024    SODIUM 139 11/12/2024    K 4.0 11/12/2024     11/12/2024    CO2 30 11/12/2024    BUN 17 11/12/2024    CREATININE 0.99 11/12/2024    GLUC 85 11/12/2024    CALCIUM 9.4 11/12/2024    AST 23 11/12/2024    ALT 32 11/12/2024    ALKPHOS 73 11/12/2024    TP 8.1 11/12/2024    ALB 4.4 11/12/2024    TBILI 0.54 11/12/2024    CHOLESTEROL 151 11/26/2024    HDL 39 (L) 11/26/2024    TRIG 170 (H) 11/26/2024    LDLCALC 78 11/26/2024    NONHDLC 112 11/26/2024    [id number] 2.532 11/12/2024    FREET4 0.56 (L) 09/10/2024    SYPHILISAB Non-reactive 05/15/2024       Suicide/Homicide Risk Assessment:    Risk of Harm to Self:   Nursing Suicide Risk Assessment Last 24 hours: C-SSRS Risk (Since Last Contact)  Calculated C-SSRS Risk Score (Since Last Contact): No Risk Indicated  Current Specific Risk Factors include: diagnosis of mood disorder, current depressive symptoms  Protective Factors: no current suicidal ideation, ability to communicate with staff on the unit, able to contract for safety on the unit, taking medications as ordered on the unit, improved mood, improved depressive symptoms, responds to redirection  Based on today's assessment, Franco presents the following risk of harm to self: low    Risk of Harm to Others:  Nursing Homicide Risk Assessment: Violence Risk to Others: Denies within past 6 months  Current Specific Risk Factors include: social difficulties  Protective Factors: no current homicidal ideation, compliant with medications on the  unit as ordered, follows staff redirection  Based on today's assessment, Franco presents the following risk of harm to others: low    The following interventions are recommended: Behavioral Health checks for safety monitoring, continued hospitalization on locked unit    Progress Toward Goals: progressing, depression is improving, placement pending at group home    Counseling / Coordination of Care:    Total floor / unit time spent today 30 minutes. Greater than 50% of total time was spent with the patient and / or family counseling and / or coordination of care. A description of counseling / coordination of care:  Patient's progress discussed with staff in treatment team meeting.    Administrative Statements   I have spent a total time of 30 minutes in caring for this patient on the day of the visit/encounter including Diagnostic results, Documenting in the medical record, Reviewing / ordering tests, medicine, procedures  , Obtaining or reviewing history  , and Communicating with other healthcare professionals .    WALLY Gomez 01/13/25

## 2025-01-13 NOTE — PROGRESS NOTES
01/13/25 0836   Team Meeting   Meeting Type Daily Rounds   Team Members Present   Team Members Present Physician;;Nurse   Physician Team Member Gisela   Nursing Team Member [facility] Management Team Member Noa   Patient/Family Present   Patient Present No   Patient's Family Present No     Pt is medication and meal compliant. Pt denies SI/HI/AVH. Pt is calm and cooperative. Pt's discharge is pending discharge and [facility] decision.

## 2025-01-13 NOTE — NURSING NOTE
Patient has been in his room all evening. He is lying in bed awake and is responsive to staff when they enter the room.

## 2025-01-14 PROCEDURE — 99232 SBSQ HOSP IP/OBS MODERATE 35: CPT

## 2025-01-14 RX ADMIN — FAMOTIDINE 20 MG: 20 TABLET ORAL at 17:22

## 2025-01-14 RX ADMIN — ATORVASTATIN CALCIUM 10 MG: 10 TABLET, FILM COATED ORAL at 17:22

## 2025-01-14 RX ADMIN — ARIPIPRAZOLE 5 MG: 5 TABLET ORAL at 08:11

## 2025-01-14 RX ADMIN — CYANOCOBALAMIN TAB 1000 MCG 1000 MCG: 1000 TAB at 08:11

## 2025-01-14 RX ADMIN — SENNOSIDES AND DOCUSATE SODIUM 2 TABLET: 50; 8.6 TABLET ORAL at 17:22

## 2025-01-14 RX ADMIN — LEVOTHYROXINE SODIUM 37.5 MCG: 125 TABLET ORAL at 05:14

## 2025-01-14 RX ADMIN — SERTRALINE HYDROCHLORIDE 150 MG: 100 TABLET ORAL at 08:11

## 2025-01-14 RX ADMIN — FAMOTIDINE 20 MG: 20 TABLET ORAL at 08:11

## 2025-01-14 RX ADMIN — Medication 2500 UNITS: at 08:11

## 2025-01-14 NOTE — PROGRESS NOTES
"Progress Note - Behavioral Health   Name: Franco Roberson 39 y.o. male I MRN: [id number]  Unit/Bed#: -01 I Date of Admission: 5/14/2024   Date of Service: 1/14/2025 I Hospital Day: 245     Assessment & Plan  MDD (major depressive disorder), recurrent severe, without psychosis (HCC)  Patient continues to do well and the plan is to continue with the current treatment plan as mentioned below with no changes.    Patient continues to be pending group home placement. Otherwise no clinical significant change.  Pt and SW able to have meeting w/ [facility] on 12/13. Waiting for follow up from [facility] by mail.    Medication regimen as follows, no changes as of 1/14/2025:  Abilify 5 mg daily as mood adjunct   Zoloft 150 mg daily for depression and anxiety  Continue to encourage participation in group therapy, milieu therapy and occupational therapy.  Continue to assess for side effects of medications.  Continue collaboration with [facility] for medical co-morbidities as indicated.  Continue discussion with CM/SW to assist with obtaining collateral, disposition planning, and the implementation of patient-centered individualized plan of care.  Continue frequent safety checks and vitals per unit protocol.    Risks, benefits and possible side effects of Medications: Risks, benefits, and possible side effects of medications have previously been explained. No new medications at this time.      Legal status: 201    Disposition: to be determined, pending SOAR application for potential group home placement. OT Cognitive Evaluation completed: \"Pt would benefit from discharge to a supportive environment that can provide checks for safety and compliance with IADLs. \"   Although patient's mood has stabilized, they are currently awaiting group home placement.  Their ability to recognize safety hazards take medications and participate in IADLs are significantly impaired by their cognitive deficits compounded by their chronic mental health needs and would " "be at risk for significant decompensation without the structure and support of a group home setting.      No associated orders from this encounter found during lookback period of 72 hours.  Autism spectrum disorder  Continue supportive care    No associated orders from this encounter found during lookback period of 72 hours.      Planned medication and treatment changes:    All current active medications have been reviewed  Encourage group therapy, milieu therapy and occupational therapy  Behavioral Health checks for safety monitoring  Continue current medications:  Pending placement    Behavior over the last 24 hours: unchanged.     Franco is seen today for psychiatric follow up. Per nursing notes, calm and cooperative, denies SI HI and AVH, declines depression and anxiety, however withdrawn and isolative to self, med compliant, flat in conversation.  Patient remains in behavioral control. No psych prns in last 24 hours.     Today Franco is seen laying down in his room calm and comfortable.  He remains disheveled with unkempt hair but is cooperative.  Reports that he is doing \"good\" today and denies depression/anxiety at present time.  Notes that he has been sleeping well and denies any medication side effects when asked.  He reports that he continues to go to groups and is awaiting placement at this time.    Denies suicidal and homicidal ideations notes that he was sick recently and is feeling physically better from this.  Denies auditory and visual hallucinations when asked.  Continued encouragement to go to groups.  Pending placement.    Sleep:  adequate  Appetite:  Adequate  Medication side effects: No   ROS: no complaints    Mental Status Evaluation:    Appearance:  Dressed appropriate, laying in bed, disheveled and poor hygiene   Behavior:  pleasant, cooperative, calm   Speech:  normal rate and volume   Mood:  euthymic   Affect:  constricted   Thought Process:  goal directed, concrete   Associations: concrete " associations   Thought Content:  no overt delusions   Perceptual Disturbances: denies auditory hallucinations when asked, does not appear responding to internal stimuli, denies visual hallucinations when asked   Risk Potential: Suicidal ideation - None  Homicidal ideation - None  Potential for aggression - No   Sensorium:  oriented to person, place, and time/date   Memory:  recent and remote memory grossly intact   Consciousness:  alert and awake   Attention/Concentration: attention span and concentration are age appropriate   Insight:  limited   Judgment: limited   Gait/Station: normal gait/station   Motor Activity: no abnormal movements     Vital signs in last 24 hours:    Temp:  [96.5 °F (35.8 °C)-96.8 °F (36 °C)] 96.8 °F (36 °C)  HR:  [78-88] 78  BP: (124-125)/(61-64) 125/64  Resp:  [16] 16  SpO2:  [95 %-98 %] 98 %  O2 Device: None (Room air)    Laboratory results: I have personally reviewed all pertinent laboratory/tests results    Results from the past 24 hours: No results found for this or any previous visit (from the past 24 hours).    Progress Toward Goals: Awaiting placement, reports feeling better from recent sickness, sleeping well, less anxious and denies depression, encouraged to go to groups, med compliant, calm and cooperative    Assessment & Plan   Principal Problem:    MDD (major depressive disorder), recurrent severe, without psychosis (HCC)  Active Problems:    Unspecified depressive disorder (HCC)    Medical clearance for psychiatric admission    Hyperlipidemia    Vitamin D deficiency    B12 deficiency    Hypothyroidism    Autism spectrum disorder        Current Facility-Administered Medications   Medication Dose Route Frequency Provider Last Rate    acetaminophen  650 mg Oral Q6H PRN Basilio Brown MD      acetaminophen  650 mg Oral Q4H PRN Basilio Brown MD      acetaminophen  975 mg Oral Q6H PRN Basilio Brown MD      aluminum-magnesium hydroxide-simethicone  30 mL Oral Q4H PRN  Basilio Brown MD      ARIPiprazole  5 mg Oral Daily Basilio Panda MD      Artificial Tears  1 drop Both Eyes Q3H PRN Basilio Brown MD      atorvastatin  10 mg Oral Daily With Dinner WALLY Baptiste      benztropine  1 mg Intramuscular Q4H PRN Max 6/day Basilio Brown MD      benztropine  1 mg Oral Q4H PRN Max 6/day Basilio Brown MD      Cholecalciferol  2,500 Units Oral Daily Dustin Mcallister MD      cyanocobalamin  1,000 mcg Oral Daily WALLY Baptiste      Diclofenac Sodium  2 g Topical 4x Daily PRN WALLY Baptiste      hydrOXYzine HCL  50 mg Oral Q6H PRN Max 4/day Basilio Brown MD      Or    diphenhydrAMINE  50 mg Intramuscular Q6H PRN Basilio Brown MD      diphenhydrAMINE-zinc acetate   Topical Daily PRN Raj Guerrero MD      famotidine  20 mg Oral BID WALLY Baptiste      hydrOXYzine HCL  100 mg Oral Q6H PRN Max 4/day Basilio Brown MD      Or    LORazepam  2 mg Intramuscular Q6H PRN Basilio Brown MD      hydrOXYzine HCL  25 mg Oral Q6H PRN Max 4/day Basilio Brown MD      levothyroxine  37.5 mcg Oral Early Morning WALLY Baptiste      melatonin  6 mg Oral HS PRN WALLY Gomez      methocarbamol  500 mg Oral Q6H PRN WALLY Baptiste      OLANZapine  5 mg Oral Q4H PRN Max 3/day Basilio Brown MD      Or    OLANZapine  2.5 mg Intramuscular Q4H PRN Max 3/day Basilio Brown MD      OLANZapine  5 mg Oral Q3H PRN Max 3/day Basliio Brown MD      Or    OLANZapine  5 mg Intramuscular Q3H PRN Max 3/day Basilio Brown MD      OLANZapine  2.5 mg Oral Q4H PRN Max 6/day Basilio Brown MD      polyethylene glycol  17 g Oral Daily PRN Basilio Brown MD      propranolol  10 mg Oral Q8H PRN Basilio Brown MD      sensonia-docusate sodium  2 tablet Oral After Dinner Dustin Mcallister MD      sertraline  150 mg Oral Daily WALLY Gomez       traZODone  50 mg Oral HS PRN Basilio Brown MD       Risks / Benefits of Treatment:    Risks, benefits, and possible side effects of medications explained to patient. Patient has limited understanding of risks and benefits of treatment at this time, but agrees to take medications as prescribed.    Counseling / Coordination of Care:    Patient's progress discussed with staff in treatment team meeting.  Group attendance encouraged.    Zander Hurst PA-C 01/14/25

## 2025-01-14 NOTE — NURSING NOTE
PT observed cooperative and calm upon approach, denies SI/HI/VH/AH, decline depression nor anxiety withdrawn and isolative to self.  No scheduled meds for this PT and no PRNs nor unmet needs, Q15 minutes checks on going for safety.

## 2025-01-14 NOTE — PLAN OF CARE
Problem: Depression  Goal: Treatment Goal: Demonstrate behavioral control of depressive symptoms, verbalize feelings of improved mood/affect, and adopt new coping skills prior to discharge  Outcome: Progressing  Goal: Refrain from harming self  Description: Interventions:  - Monitor patient closely, per order   - Supervise medication ingestion, monitor effects and side effects   Outcome: Progressing     Problem: Anxiety  Goal: Anxiety is at manageable level  Description: Interventions:  - Assess and monitor patient's anxiety level.   - Monitor for signs and symptoms (heart palpitations, chest pain, shortness of breath, headaches, nausea, feeling jumpy, restlessness, irritable, apprehensive).   - Collaborate with interdisciplinary team and initiate plan and interventions as ordered.  - [place] patient to unit/surroundings  - Explain treatment plan  - Encourage participation in care  - Encourage verbalization of concerns/fears  - Identify coping mechanisms  - Assist in developing anxiety-reducing skills  - Administer/offer alternative therapies  - Limit or eliminate stimulants  Outcome: Progressing     Problem: DISCHARGE PLANNING - CARE MANAGEMENT  Goal: Discharge to post-acute care or home with appropriate resources  Description: INTERVENTIONS:  - Conduct assessment to determine patient/family and health care team treatment goals, and need for post-acute services based on payer coverage, community resources, and patient preferences, and barriers to discharge  - Address psychosocial, clinical, and financial barriers to discharge as identified in assessment in conjunction with the patient/family and health care team  - Arrange appropriate level of post-acute services according to patient’s   needs and preference and payer coverage in collaboration with the physician and health care team  - Communicate with and update the patient/family, physician, and health care team regarding progress on the discharge plan  -  Arrange appropriate transportation to post-acute venues  Outcome: Progressing     Problem: Knowledge Deficit  Goal: Patient/family/caregiver demonstrates understanding of disease process, treatment plan, medications, and discharge instructions  Description: Complete learning assessment and assess knowledge base.  Interventions:  - Provide teaching at level of understanding  - Provide teaching via preferred learning methods  Outcome: Progressing     Problem: SLEEP DISTURBANCE  Goal: Will exhibit normal sleeping pattern  Description: Interventions:  -  Assess the patients sleep pattern, noting recent changes  - Administer medication as ordered  - Decrease environmental stimuli, including noise, as appropriate during the night  - Encourage the patient to actively participate in unit groups and or exercise during the day to enhance ability to achieve adequate sleep at night  - Assess the patient, in the morning, encouraging a description of sleep experience  Outcome: Progressing     Problem: Ineffective Coping  Goal: Participates in unit activities  Description: Interventions:  - Provide therapeutic environment   - Provide required programming   - Redirect inappropriate behaviors   Outcome: Not Progressing     Problem: Depression  Goal: Verbalize thoughts and feelings  Description: Interventions:  - Assess and re-assess patient's level of risk   - Engage patient in 1:1 interactions, daily, for a minimum of 15 minutes   - Encourage patient to express feelings, fears, frustrations, hopes   Outcome: Not Progressing  Goal: Refrain from isolation  Description: Interventions:  - Develop a trusting relationship   - Encourage socialization   Outcome: Not Progressing

## 2025-01-14 NOTE — PROGRESS NOTES
01/14/25 1008   Team Meeting   Meeting Type Daily Rounds   Team Members Present   Team Members Present Physician;;Nurse   Physician Team Member Gisela   Nursing Team Member [facility]   Care Management Team Member Noa   Patient/Family Present   Patient Present No   Patient's Family Present No     Pt is medication and meal compliant. Pt denies SI/HI/AVH. Pt is calm and cooperative and encouraged to attend groups. Pt updated [facility] yesterday and provided them with proof of [facility] account. Pt's discharge is pending placement.

## 2025-01-14 NOTE — NURSING NOTE
Pt pleasant and cooperative upon approach. Withdrawn to room. Flat in conversation but denies depression. Denies SI/HI/AH/VH. Medication and meal compliant. No current unmet needs.

## 2025-01-14 NOTE — ASSESSMENT & PLAN NOTE
"Patient continues to do well and the plan is to continue with the current treatment plan as mentioned below with no changes.    Patient continues to be pending group home placement. Otherwise no clinical significant change.  Pt and SW able to have meeting w/ [facility] [place]13. Waiting for follow up from [facility] [place] mail.    Medication regimen as follows, no changes as of 1/14/2025:  Abilify 5 mg daily as mood adjunct   Zoloft 150 mg daily for depression and anxiety  Continue to encourage participation in group therapy, milieu therapy and occupational therapy.  Continue to assess for side effects of medications.  Continue collaboration with [facility] for medical co-morbidities as indicated.  Continue discussion with CM/SW to assist with obtaining collateral, disposition planning, and the implementation of patient-centered individualized plan of care.  Continue frequent safety checks and vitals per unit protocol.    Risks, benefits and possible side effects of Medications: Risks, benefits, and possible side effects of medications have previously been explained. No new medications at this time.      Legal status: 201    Disposition: to be determined, pending SOAR application for potential group home placement. OT Cognitive Evaluation completed: \"Pt would benefit from discharge to a supportive environment that can provide checks for safety and compliance with IADLs. \"   Although patient's mood has stabilized, they are currently awaiting group home placement.  Their ability to recognize safety hazards take medications and participate in IADLs are significantly impaired by their cognitive deficits compounded by their chronic mental health needs and would be at risk for significant decompensation without the structure and support of a group home setting.      No associated orders from this encounter found during lookback period of 72 hours.  "

## 2025-01-15 PROCEDURE — 99232 SBSQ HOSP IP/OBS MODERATE 35: CPT | Performed by: PSYCHIATRY & NEUROLOGY

## 2025-01-15 RX ADMIN — FAMOTIDINE 20 MG: 20 TABLET ORAL at 17:08

## 2025-01-15 RX ADMIN — SERTRALINE HYDROCHLORIDE 150 MG: 100 TABLET ORAL at 08:15

## 2025-01-15 RX ADMIN — CYANOCOBALAMIN TAB 1000 MCG 1000 MCG: 1000 TAB at 08:15

## 2025-01-15 RX ADMIN — ATORVASTATIN CALCIUM 10 MG: 10 TABLET, FILM COATED ORAL at 17:08

## 2025-01-15 RX ADMIN — ARIPIPRAZOLE 5 MG: 5 TABLET ORAL at 08:15

## 2025-01-15 RX ADMIN — FAMOTIDINE 20 MG: 20 TABLET ORAL at 08:15

## 2025-01-15 RX ADMIN — SENNOSIDES AND DOCUSATE SODIUM 2 TABLET: 50; 8.6 TABLET ORAL at 17:08

## 2025-01-15 RX ADMIN — Medication 2500 UNITS: at 08:15

## 2025-01-15 RX ADMIN — LEVOTHYROXINE SODIUM 37.5 MCG: 125 TABLET ORAL at 06:37

## 2025-01-15 NOTE — PROGRESS NOTES
"Progress Note - Behavioral Health   Name: Franco Roberson 39 y.o. male I MRN: [id number]  Unit/Bed#: -01 I Date of Admission: 5/14/2024   Date of Service: 1/15/2025 I Hospital Day: 246     Assessment & Plan  MDD (major depressive disorder), recurrent severe, without psychosis (HCC)  Patient continues to do well and the plan is to continue with the current treatment plan as mentioned below with no changes.    Patient continues to be pending group home placement. Otherwise no clinical significant change.   was able to contact Social Security office again and recent confirmation of patient's money being in [facility] account    Medication regimen as follows, no changes as of 1/15/2025:  Abilify 5 mg daily as mood adjunct   Zoloft 150 mg daily for depression and anxiety  Continue to encourage participation in group therapy, milieu therapy and occupational therapy.  Continue to assess for side effects of medications.  Continue collaboration with [facility] for medical co-morbidities as indicated.  Continue discussion with CM/SW to assist with obtaining collateral, disposition planning, and the implementation of patient-centered individualized plan of care.  Continue frequent safety checks and vitals per unit protocol.    Risks, benefits and possible side effects of Medications: Risks, benefits, and possible side effects of medications have previously been explained. No new medications at this time.      Legal status: 201    Disposition: to be determined, pending SOAR application for potential group home placement. OT Cognitive Evaluation completed: \"Pt would benefit from discharge to a supportive environment that can provide checks for safety and compliance with IADLs. \"   Although patient's mood has stabilized, they are currently awaiting group home placement.  Their ability to recognize safety hazards take medications and participate in IADLs are significantly impaired by their cognitive deficits compounded by their " chronic mental health needs and would be at risk for significant decompensation without the structure and support of a group home setting.      No associated orders from this encounter found during lookback period of 72 hours.  Autism spectrum disorder  Continue supportive care    No associated orders from this encounter found during lookback period of 72 hours.        Plan     Recommended Treatment:    - Encourage early mobility and having a structured day  - Provide frequent re-orientation, and cognitive stimulation  - Ensure assistive devices are in proper working order (eye-glasses, hearing aids)  - Encourage adequate hydration, nutrition and monitor bowel movements  - Maintain sleep-wake cycle: Uninterrupted sleep time; low-level lighting at night  - Fall precaution  - f/u [facility] recs regarding the medical problems   - Continue medication titration and treatment plan; adjust medication to optimize treatment response and as clinically indicated. .   - Observation: routine            - VS: as per unit protocol  - Diet: Regular diet  - Encourage group attendance and milieu therapy  - Dispo: To be determined     Scheduled medications:  Current Facility-Administered Medications   Medication Dose Route Frequency Provider Last Rate    acetaminophen  650 mg Oral Q6H PRN Basilio Brown MD      acetaminophen  650 mg Oral Q4H PRN Basilio Brown MD      acetaminophen  975 mg Oral Q6H PRN Basilio Brown MD      aluminum-magnesium hydroxide-simethicone  30 mL Oral Q4H PRN Basilio Brown MD      ARIPiprazole  5 mg Oral Daily Basilio Panda MD      Artificial Tears  1 drop Both Eyes Q3H PRN Basilio Brown MD      atorvastatin  10 mg Oral Daily With Dinner WALLY Baptiste      benztropine  1 mg Intramuscular Q4H PRN Max 6/day Basilio Brown MD      benztropine  1 mg Oral Q4H PRN Max 6/day Basilio Brown MD      Cholecalciferol  2,500 Units Oral Daily Dustin Mcallister MD       cyanocobalamin  1,000 mcg Oral Daily WALLY Baptiste      Diclofenac Sodium  2 g Topical 4x Daily PRN WALLY Baptiste      hydrOXYzine HCL  50 mg Oral Q6H PRN Max 4/day Basilio Brown MD      Or    diphenhydrAMINE  50 mg Intramuscular Q6H PRN Basilio Brown MD      diphenhydrAMINE-zinc acetate   Topical Daily PRN Raj Guerrero MD      famotidine  20 mg Oral BID LauraWALLY Tony      hydrOXYzine HCL  100 mg Oral Q6H PRN Max 4/day Basilio Brown MD      Or    LORazepam  2 mg Intramuscular Q6H PRN Basilio Brown MD      hydrOXYzine HCL  25 mg Oral Q6H PRN Max 4/day Basilio Brown MD      levothyroxine  37.5 mcg Oral Early Morning WALLY Baptiste      melatonin  6 mg Oral HS PRN WALLY Gomez      methocarbamol  500 mg Oral Q6H PRN WALLY Baptiste      OLANZapine  5 mg Oral Q4H PRN Max 3/day Basilio Brown MD      Or    OLANZapine  2.5 mg Intramuscular Q4H PRN Max 3/day Basilio Brown MD      OLANZapine  5 mg Oral Q3H PRN Max 3/day Basilio Brown MD      Or    OLANZapine  5 mg Intramuscular Q3H PRN Max 3/day Basilio Brown MD      OLANZapine  2.5 mg Oral Q4H PRN Max 6/day Basilio Brown MD      polyethylene glycol  17 g Oral Daily PRN Basilio Brown MD      propranolol  10 mg Oral Q8H PRN Basilio Brown MD      senna-docusate sodium  2 tablet Oral After Dinner Dustin Mcallister MD      sertraline  150 mg Oral Daily WALLY Gomez      traZODone  50 mg Oral HS PRN Basilio Brown MD        PRN:    acetaminophen    acetaminophen    acetaminophen    aluminum-magnesium hydroxide-simethicone    Artificial Tears    benztropine    benztropine    Diclofenac Sodium    hydrOXYzine HCL **OR** diphenhydrAMINE    diphenhydrAMINE-zinc acetate    hydrOXYzine HCL **OR** LORazepam    hydrOXYzine HCL    melatonin    methocarbamol    OLANZapine **OR** OLANZapine    OLANZapine **OR**  "OLANZapine    OLANZapine    polyethylene glycol    propranolol    traZODone       Subjective     Patient was visited on unit for continuing care; chart reviewed and discussed with multidisciplinary treatment team.  On approach, the patient was calm and cooperative. Denied any changes in mood, appetite, and energy level. No problem initiating and maintaining sleep.  Denied A/VH currently.  Denied SI/HI, intent or plan upon direct inquiry at this time.    Patient continues to be visible in the milieu and interacts with staff and peers. No reports of aggression or self-injurious behavior on unit. No PRN medications used in the past 24 hours.    Patient accepted all offered medications and no adverse effects of medications noted or reported.    Objective    Current Mental Status Evaluation:  Mental Status Exam  Appearance: casually dressed, consistent with stated age  Motor: no psychomotor retardation, no gait abnormalities  Behavior: cooperative, answers questions appropriately  Speech: soft, normal rhythm  Mood: \"good\"  Affect: blunted  Thought Process: concrete and goal-oriented  Thought Content: denies delusions  Risk Potential: denies suicidal ideation, plan, or intent. Denies homicidal ideation  Perceptions: denies auditory hallucinations, denies visual hallucinations,   Sensorium: Oriented to person, place, time, and situation  Cognition: cognitive ability appears intact but was not quantitatively tested  Consciousness: alert and awake  Attention: intact, able to focus without difficulty  Insight: limited  Judgement: limited            Vital signs in last 24 hours:    Temp:  [97.5 °F (36.4 °C)-97.6 °F (36.4 °C)] 97.5 °F (36.4 °C)  HR:  [65-78] 65  BP: (118-122)/(54-71) 122/54  Resp:  [16] 16  SpO2:  [96 %] 96 %  O2 Device: None (Room air)    Psychiatric Review of Systems:  Medication adverse effects: none  Sleep: unchanged  Appetite: unchanged  Behaviors over the past 24 hours: unchanged    Laboratory results:  "   I have personally reviewed all pertinent laboratory/tests results  No results found for this or any previous visit (from the past 48 hours).       Progress Toward Goals & Illness Status:   progressing, discharge planning, placement pending    Patient is not at goal. They are not yet ready for discharge. The patient's condition currently requires active psychopharmacological medication management, interdisciplinary coordination with case management, and the utilization of adjunctive milieu and group therapy to augment psychopharmacological efficacy. The patient's risk of morbidity, and progression or decompensation of psychiatric disease, is higher without this current treatment.     Next of Kin  Extended Emergency Contact Information  Primary Emergency Contact: Lois Roberson  Address: [address]           [place] [id number] [place] [address] [place]  Home Phone: [phone]  Relation: Mother    Counseling / Coordination of Care  Patient's progress discussed with staff in treatment team meeting.  Medications, treatment progress and treatment plan reviewed with patient.  Medication education provided to patient.  Educated on importance of medication and treatment compliance.  Supportive therapy provided to patient.  Cognitive techniques utilized during the session.  Reassurance and supportive therapy provided.  Reoriented to reality and reassured.  Encouraged participation in milieu and group therapy on the unit.  Crisis/safety plan discussed with patient.       Dustin Mcallister MD  Attending Psychiatrist   [facility]      This note has been constructed using a voice recognition system. There may be translation, syntax, or grammatical errors. If you have any questions, please contact the dictating provider.

## 2025-01-15 NOTE — NURSING NOTE
Pt pleasant and cooperative. Withdrawn to self. Denies SI/HI/AH/VH. Medication and meal compliant. No current unmet needs.

## 2025-01-15 NOTE — SOCIAL WORK
Cm received missed calls from [facility] at 5:30 pm yesterday. Cm called the local office with Pt. They transferred the call to Pt's designated Cm for his case and the phone call rang for about 10 minutes. CM and Pt hung up the call.     Cm will attempt to call at a different time through out the day.

## 2025-01-15 NOTE — PLAN OF CARE
Problem: Ineffective Coping  Goal: Participates in unit activities  Description: Interventions:  - Provide therapeutic environment   - Provide required programming   - Redirect inappropriate behaviors   1/15/2025 0032 by Lindy Sandra RN  Outcome: Progressing  1/15/2025 0020 by Lindy Sandra RN  Outcome: Progressing     Problem: Depression  Goal: Treatment Goal: Demonstrate behavioral control of depressive symptoms, verbalize feelings of improved mood/affect, and adopt new coping skills prior to discharge  1/15/2025 0032 by Lindy Sandra RN  Outcome: Progressing  1/15/2025 0020 by Lindy Sandra RN  Outcome: Progressing  Goal: Verbalize thoughts and feelings  Description: Interventions:  - Assess and re-assess patient's level of risk   - Engage patient in 1:1 interactions, daily, for a minimum of 15 minutes   - Encourage patient to express feelings, fears, frustrations, hopes   1/15/2025 0032 by Lindy Sandra RN  Outcome: Progressing  1/15/2025 0020 by Lindy Sandra RN  Outcome: Progressing  Goal: Refrain from harming self  Description: Interventions:  - Monitor patient closely, per order   - Supervise medication ingestion, monitor effects and side effects   1/15/2025 0032 by Lindy Sandra RN  Outcome: Progressing  1/15/2025 0020 by Lindy Sandra RN  Outcome: Progressing  Goal: Refrain from isolation  Description: Interventions:  - Develop a trusting relationship   - Encourage socialization   1/15/2025 0032 by Lindy Sandra RN  Outcome: Progressing  1/15/2025 0020 by Lindy Sandra RN  Outcome: Progressing     Problem: Anxiety  Goal: Anxiety is at manageable level  Description: Interventions:  - Assess and monitor patient's anxiety level.   - Monitor for signs and symptoms (heart palpitations, chest pain, shortness of breath, headaches, nausea, feeling jumpy, restlessness, irritable, apprehensive).   - Collaborate with interdisciplinary team and initiate plan and interventions as ordered.  - [place] patient to  unit/surroundings  - Explain treatment plan  - Encourage participation in care  - Encourage verbalization of concerns/fears  - Identify coping mechanisms  - Assist in developing anxiety-reducing skills  - Administer/offer alternative therapies  - Limit or eliminate stimulants  1/15/2025 0032 by Lindy Sandra RN  Outcome: Progressing  1/15/2025 0020 by Lindy Sandra RN  Outcome: Progressing     Problem: DISCHARGE PLANNING - CARE MANAGEMENT  Goal: Discharge to post-acute care or home with appropriate resources  Description: INTERVENTIONS:  - Conduct assessment to determine patient/family and health care team treatment goals, and need for post-acute services based on payer coverage, community resources, and patient preferences, and barriers to discharge  - Address psychosocial, clinical, and financial barriers to discharge as identified in assessment in conjunction with the patient/family and health care team  - Arrange appropriate level of post-acute services according to patient’s   needs and preference and payer coverage in collaboration with the physician and health care team  - Communicate with and update the patient/family, physician, and health care team regarding progress on the discharge plan  - Arrange appropriate transportation to post-acute venues  1/15/2025 0032 by Lindy Sandra RN  Outcome: Progressing  1/15/2025 0020 by Lindy Sandra RN  Outcome: Progressing     Problem: Knowledge Deficit  Goal: Patient/family/caregiver demonstrates understanding of disease process, treatment plan, medications, and discharge instructions  Description: Complete learning assessment and assess knowledge base.  Interventions:  - Provide teaching at level of understanding  - Provide teaching via preferred learning methods  1/15/2025 0032 by Lindy Sandra RN  Outcome: Progressing  1/15/2025 0020 by Lindy Sandra RN  Outcome: Progressing     Problem: SLEEP DISTURBANCE  Goal: Will exhibit normal sleeping pattern  Description:  Interventions:  -  Assess the patients sleep pattern, noting recent changes  - Administer medication as ordered  - Decrease environmental stimuli, including noise, as appropriate during the night  - Encourage the patient to actively participate in unit groups and or exercise during the day to enhance ability to achieve adequate sleep at night  - Assess the patient, in the morning, encouraging a description of sleep experience  1/15/2025 0032 by Lindy Sandra RN  Outcome: Progressing  1/15/2025 0020 by Lindy Sandra RN  Outcome: Progressing

## 2025-01-15 NOTE — ASSESSMENT & PLAN NOTE
"Patient continues to do well and the plan is to continue with the current treatment plan as mentioned below with no changes.    Patient continues to be pending group home placement. Otherwise no clinical significant change.   was able to contact Social Security office again and recent confirmation of patient's money being in [employer] account    Medication regimen as follows, no changes as of 1/15/2025:  Abilify 5 mg daily as mood adjunct   Zoloft 150 mg daily for depression and anxiety  Continue to encourage participation in group therapy, milieu therapy and occupational therapy.  Continue to assess for side effects of medications.  Continue collaboration with PATRICIA for medical co-morbidities as indicated.  Continue discussion with CM/SW to assist with obtaining collateral, disposition planning, and the implementation of patient-centered individualized plan of care.  Continue frequent safety checks and vitals per unit protocol.    Risks, benefits and possible side effects of Medications: Risks, benefits, and possible side effects of medications have previously been explained. No new medications at this time.      Legal status: 201    Disposition: to be determined, pending SOAR application for potential group home placement. OT Cognitive Evaluation completed: \"Pt would benefit from discharge to a supportive environment that can provide checks for safety and compliance with IADLs. \"   Although patient's mood has stabilized, they are currently awaiting group home placement.  Their ability to recognize safety hazards take medications and participate in IADLs are significantly impaired by their cognitive deficits compounded by their chronic mental health needs and would be at risk for significant decompensation without the structure and support of a group home setting.      No associated orders from this encounter found during lookback period of 72 hours.  "

## 2025-01-15 NOTE — PLAN OF CARE
Problem: Ineffective Coping  Goal: Participates in unit activities  Description: Interventions:  - Provide therapeutic environment   - Provide required programming   - Redirect inappropriate behaviors   Outcome: Progressing     Problem: Depression  Goal: Treatment Goal: Demonstrate behavioral control of depressive symptoms, verbalize feelings of improved mood/affect, and adopt new coping skills prior to discharge  Outcome: Progressing  Goal: Verbalize thoughts and feelings  Description: Interventions:  - Assess and re-assess patient's level of risk   - Engage patient in 1:1 interactions, daily, for a minimum of 15 minutes   - Encourage patient to express feelings, fears, frustrations, hopes   Outcome: Progressing  Goal: Refrain from harming self  Description: Interventions:  - Monitor patient closely, per order   - Supervise medication ingestion, monitor effects and side effects   Outcome: Progressing  Goal: Refrain from isolation  Description: Interventions:  - Develop a trusting relationship   - Encourage socialization   Outcome: Progressing     Problem: Anxiety  Goal: Anxiety is at manageable level  Description: Interventions:  - Assess and monitor patient's anxiety level.   - Monitor for signs and symptoms (heart palpitations, chest pain, shortness of breath, headaches, nausea, feeling jumpy, restlessness, irritable, apprehensive).   - Collaborate with interdisciplinary team and initiate plan and interventions as ordered.  - [place] patient to unit/surroundings  - Explain treatment plan  - Encourage participation in care  - Encourage verbalization of concerns/fears  - Identify coping mechanisms  - Assist in developing anxiety-reducing skills  - Administer/offer alternative therapies  - Limit or eliminate stimulants  Outcome: Progressing     Problem: DISCHARGE PLANNING - CARE MANAGEMENT  Goal: Discharge to post-acute care or home with appropriate resources  Description: INTERVENTIONS:  - Conduct assessment to  determine patient/family and health care team treatment goals, and need for post-acute services based on payer coverage, community resources, and patient preferences, and barriers to discharge  - Address psychosocial, clinical, and financial barriers to discharge as identified in assessment in conjunction with the patient/family and health care team  - Arrange appropriate level of post-acute services according to patient’s   needs and preference and payer coverage in collaboration with the physician and health care team  - Communicate with and update the patient/family, physician, and health care team regarding progress on the discharge plan  - Arrange appropriate transportation to post-acute venues  Outcome: Progressing     Problem: Knowledge Deficit  Goal: Patient/family/caregiver demonstrates understanding of disease process, treatment plan, medications, and discharge instructions  Description: Complete learning assessment and assess knowledge base.  Interventions:  - Provide teaching at level of understanding  - Provide teaching via preferred learning methods  Outcome: Progressing     Problem: SLEEP DISTURBANCE  Goal: Will exhibit normal sleeping pattern  Description: Interventions:  -  Assess the patients sleep pattern, noting recent changes  - Administer medication as ordered  - Decrease environmental stimuli, including noise, as appropriate during the night  - Encourage the patient to actively participate in unit groups and or exercise during the day to enhance ability to achieve adequate sleep at night  - Assess the patient, in the morning, encouraging a description of sleep experience  Outcome: Progressing

## 2025-01-15 NOTE — PROGRESS NOTES
01/15/25 0836   Team Meeting   Meeting Type Daily Rounds   Team Members Present   Team Members Present Nurse;Physician;   Physician Team Member Ary   Nursing Team Member Ministerio   Care Management Team Member Noa   Patient/Family Present   Patient Present No   Patient's Family Present No     Pt is medication and meal compliant. Pt denies SI/HI/AVH. Pt is calm and cooperative. Pt's discharge is pending placement.

## 2025-01-15 NOTE — NURSING NOTE
PT withdrawn to self, cooperative and calm on approach. PT denies SI/HI/VH/AH, no depression nor anxiety reported.  No scheduled meds at this time , Q15 minutes checks on going for safety in place

## 2025-01-16 PROCEDURE — 99232 SBSQ HOSP IP/OBS MODERATE 35: CPT | Performed by: PSYCHIATRY & NEUROLOGY

## 2025-01-16 RX ADMIN — CYANOCOBALAMIN TAB 1000 MCG 1000 MCG: 1000 TAB at 08:16

## 2025-01-16 RX ADMIN — ATORVASTATIN CALCIUM 10 MG: 10 TABLET, FILM COATED ORAL at 17:33

## 2025-01-16 RX ADMIN — SERTRALINE HYDROCHLORIDE 150 MG: 100 TABLET ORAL at 08:16

## 2025-01-16 RX ADMIN — FAMOTIDINE 20 MG: 20 TABLET ORAL at 17:33

## 2025-01-16 RX ADMIN — SENNOSIDES AND DOCUSATE SODIUM 2 TABLET: 50; 8.6 TABLET ORAL at 17:33

## 2025-01-16 RX ADMIN — FAMOTIDINE 20 MG: 20 TABLET ORAL at 08:16

## 2025-01-16 RX ADMIN — LEVOTHYROXINE SODIUM 37.5 MCG: 125 TABLET ORAL at 06:36

## 2025-01-16 RX ADMIN — Medication 2500 UNITS: at 08:15

## 2025-01-16 RX ADMIN — ARIPIPRAZOLE 5 MG: 5 TABLET ORAL at 08:16

## 2025-01-16 NOTE — PROGRESS NOTES
"Progress Note - Behavioral Health   Name: Franco Roberson 39 y.o. male I MRN: [id number]   Unit/Bed#: -01 I Date of Admission: 5/14/2024   Date of Service: 1/16/2025 I Hospital Day: 247         Assessment & Plan  MDD (major depressive disorder), recurrent severe, without psychosis (HCC)  Patient continues to do well and the plan is to continue with the current treatment plan as mentioned below with no changes.    Patient continues to be pending group home placement. Otherwise no clinical significant change.   was able to contact Social Security office again and recent confirmation of patient's money being in [employer] account    Medication regimen as follows, no changes as of 1/16/2025:  Abilify 5 mg daily as mood adjunct   Zoloft 150 mg daily for depression and anxiety  Continue to encourage participation in group therapy, milieu therapy and occupational therapy.  Continue to assess for side effects of medications.  Continue collaboration with [facility] for medical co-morbidities as indicated.  Continue discussion with CM/SW to assist with obtaining collateral, disposition planning, and the implementation of patient-centered individualized plan of care.  Continue frequent safety checks and vitals per unit protocol.    Risks, benefits and possible side effects of Medications: Risks, benefits, and possible side effects of medications have previously been explained. No new medications at this time.      Legal status: 201    Disposition: to be determined, pending SOAR application for potential group home placement. OT Cognitive Evaluation completed: \"Pt would benefit from discharge to a supportive environment that can provide checks for safety and compliance with IADLs. \"   Although patient's mood has stabilized, they are currently awaiting group home placement.  Their ability to recognize safety hazards take medications and participate in IADLs are significantly impaired by their cognitive deficits compounded by " their chronic mental health needs and would be at risk for significant decompensation without the structure and support of a group home setting.      No associated orders from this encounter found during lookback period of 72 hours.  Autism spectrum disorder  Continue supportive care    No associated orders from this encounter found during lookback period of 72 hours.          Recommended Treatment:     Treatment plan and medication changes discussed and per the attending physician the plan is:     1.Continue with group therapy, milieu therapy and occupational therapy  2.Behavioral Health checks every 15 minutes  3.Continue frequent safety checks and vitals per unit protocol  4.Continue with [facility] medical management as indicated  5.Continue with current medication regimen  6.Will review labs in the a.m.  7.Disposition Planning: Discharge planning and efforts remain ongoing     Planned medication and treatment changes:    All current active medications have been reviewed  Encourage group therapy, milieu therapy and occupational therapy  Behavioral Health checks for safety monitoring  Continue treatment with group therapy, milieu therapy and occupational therapy  Discharge planning  This patient is stable and ready for discharge, however, even at baseline, patient continues with poor insight, judgement and coping that pose a risk to patient in a homeless shelter, or otherwise unsupervised setting. Case management is assertively/actively working on securing the necessary level of care.  Continue current medications:    Current medications:  Current Facility-Administered Medications   Medication Dose Route Frequency Provider Last Rate    acetaminophen  650 mg Oral Q6H PRN Basilio Brown MD      acetaminophen  650 mg Oral Q4H PRN Basilio Brown MD      acetaminophen  975 mg Oral Q6H PRN Basilio Brown MD      aluminum-magnesium hydroxide-simethicone  30 mL Oral Q4H PRN Basilio Brown MD       ARIPiprazole  5 mg Oral Daily Basilio Panda MD      Artificial Tears  1 drop Both Eyes Q3H PRN Basilio Brown MD      atorvastatin  10 mg Oral Daily With Dinner WALLY Baptiste      benztropine  1 mg Intramuscular Q4H PRN Max 6/day Basilio Brown MD      benztropine  1 mg Oral Q4H PRN Max 6/day Basilio Brown MD      Cholecalciferol  2,500 Units Oral Daily Dustin Mcallister MD      cyanocobalamin  1,000 mcg Oral Daily WALLY Baptiste      Diclofenac Sodium  2 g Topical 4x Daily PRN WALLY Baptiste      hydrOXYzine HCL  50 mg Oral Q6H PRN Max 4/day Basilio Brown MD      Or    diphenhydrAMINE  50 mg Intramuscular Q6H PRN Basilio Brown MD      diphenhydrAMINE-zinc acetate   Topical Daily PRN Raj Guerrero MD      famotidine  20 mg Oral BID WALLY Baptiste      hydrOXYzine HCL  100 mg Oral Q6H PRN Max 4/day Basilio Brown MD      Or    LORazepam  2 mg Intramuscular Q6H PRN Basilio Brown MD      hydrOXYzine HCL  25 mg Oral Q6H PRN Max 4/day Basilio Brown MD      levothyroxine  37.5 mcg Oral Early Morning WALLY Baptiste      melatonin  6 mg Oral HS PRN WALLY Gomez      methocarbamol  500 mg Oral Q6H PRN WALLY Baptiste      OLANZapine  5 mg Oral Q4H PRN Max 3/day Basilio Brown MD      Or    OLANZapine  2.5 mg Intramuscular Q4H PRN Max 3/day Basilio Brown MD      OLANZapine  5 mg Oral Q3H PRN Max 3/day Basilio Brown MD      Or    OLANZapine  5 mg Intramuscular Q3H PRN Max 3/day Basilio Brown MD      OLANZapine  2.5 mg Oral Q4H PRN Max 6/day Basilio Brown MD      polyethylene glycol  17 g Oral Daily PRN Basilio Brown MD      propranolol  10 mg Oral Q8H PRN Basilio Brown MD      senna-docusate sodium  2 tablet Oral After Dinner Dustin Mcallister MD      sertraline  150 mg Oral Daily WALLY Gomez      traZODone  50 mg Oral HS PRN  "Basilio Brown MD         Risks / Benefits of Treatment:    Risks, benefits, and possible side effects of medications explained to patient and patient verbalizes understanding and agreement for treatment.    Subjective:    Behavior over the last 24 hours: unchanged.     Per staff, Franco continues to be visible on the unit but withdrawn to self.  He is pleasant and cooperative with staff.  No PRN medications required.  No acute behavioral issues.    Franco was  in his room today for psychiatric follow up.  Franco reports that his mood is \"okay\" today.  He reports some frustration that he and the SW have not been able to get in touch with social security on the phone.   He continues to present as disheveled and malodorous but when asked he reports he is showering \"every other day\".  He states that he plans on sleeping in today and then will attend some groups in the afternoon.  Despite appearing depressed he is still goal oriented and future thinking.  He denies any suicidal or homicidal ideation intent or plan.  He denies auditory or visual hallucinations and does not appear to be internally preoccupied.     Sleep: hypersomnia  Appetite: normal  Medication side effects: No   ROS: no complaints    Mental Status Evaluation:    Appearance:  disheveled, poor hygiene   Behavior:  cooperative, calm   Speech:  normal rate and volume   Mood:  \"Okay\"   Affect:  constricted   Thought Process:  goal directed, linear   Associations: intact associations   Thought Content:  no overt delusions   Perceptual Disturbances: none   Risk Potential: Suicidal ideation - None  Homicidal ideation - None  Potential for aggression - No   Sensorium:  oriented to person, place, and time/date   Memory:  recent memory intact   Consciousness:  alert and awake   Attention/Concentration: attention span and concentration are age appropriate   Insight:  limited   Judgment: limited   Gait/Station: in bed   Motor Activity: no abnormal movements "     Vital signs in last 24 hours:    Temp:  [96.6 °F (35.9 °C)-98 °F (36.7 °C)] 98 °F (36.7 °C)  HR:  [71-85] 71  BP: (106-121)/(67-72) 106/67  Resp:  [16-17] 16  SpO2:  [94 %-96 %] 96 %  O2 Device: None (Room air)         Laboratory results: I have personally reviewed all pertinent laboratory/tests results    Results from the past 24 hours: No results found for this or any previous visit (from the past 24 hours).  Most Recent Labs:   Lab Results   Component Value Date    WBC 6.75 11/12/2024    RBC 5.06 11/12/2024    HGB 15.9 11/12/2024    HCT 46.2 11/12/2024     11/12/2024    RDW 12.8 11/12/2024    NEUTROABS 4.15 11/12/2024    SODIUM 139 11/12/2024    K 4.0 11/12/2024     11/12/2024    CO2 30 11/12/2024    BUN 17 11/12/2024    CREATININE 0.99 11/12/2024    GLUC 85 11/12/2024    CALCIUM 9.4 11/12/2024    AST 23 11/12/2024    ALT 32 11/12/2024    ALKPHOS 73 11/12/2024    TP 8.1 11/12/2024    ALB 4.4 11/12/2024    TBILI 0.54 11/12/2024    CHOLESTEROL 151 11/26/2024    HDL 39 (L) 11/26/2024    TRIG 170 (H) 11/26/2024    LDLCALC 78 11/26/2024    NONHDLC 112 11/26/2024    VIZ5UQPTPWGD 2.532 11/12/2024    FREET4 0.56 (L) 09/10/2024    SYPHILISAB Non-reactive 05/15/2024       Suicide/Homicide Risk Assessment:    Risk of Harm to Self:   Nursing Suicide Risk Assessment Last 24 hours: C-SSRS Risk (Since Last Contact)  Calculated C-SSRS Risk Score (Since Last Contact): No Risk Indicated  Current Specific Risk Factors include: diagnosis of mood disorder  Protective Factors: no current suicidal ideation, ability to communicate with staff on the unit, able to contract for safety on the unit, taking medications as ordered on the unit, ability to adapt to change, responds to redirection  Based on today's assessment, Franco presents the following risk of harm to self: low    Risk of Harm to Others:  Nursing Homicide Risk Assessment: Violence Risk to Others: Denies within past 6 months  Current Specific Risk Factors  include: social difficulties  Protective Factors: no current homicidal ideation, compliant with medications on the unit as ordered, compliant with unit milieu, follows staff redirection  Based on today's assessment, Franco presents the following risk of harm to others: low    The following interventions are recommended: Behavioral Health checks for safety monitoring, continued hospitalization on locked unit    Progress Toward Goals: progressing, depression is improving, working on coping skills, discharge planning, placement pending at group home, poor adls, limited insight    Counseling / Coordination of Care:    Total floor / unit time spent today 30 minutes. Greater than 50% of total time was spent with the patient and / or family counseling and / or coordination of care. A description of counseling / coordination of care:  Patient's progress discussed with staff in treatment team meeting.    Administrative Statements   I have spent a total time of 30 minutes in caring for this patient on the day of the visit/encounter including Diagnostic results, Documenting in the medical record, Reviewing / ordering tests, medicine, procedures  , Obtaining or reviewing history  , and Communicating with other healthcare professionals .    WALLY Gomez 01/16/25

## 2025-01-16 NOTE — ASSESSMENT & PLAN NOTE
"Patient continues to do well and the plan is to continue with the current treatment plan as mentioned below with no changes.    Patient continues to be pending group home placement. Otherwise no clinical significant change.   was able to contact Social Security office again and recent confirmation of patient's money being in [employer] account    Medication regimen as follows, no changes as of 1/16/2025:  Abilify 5 mg daily as mood adjunct   Zoloft 150 mg daily for depression and anxiety  Continue to encourage participation in group therapy, milieu therapy and occupational therapy.  Continue to assess for side effects of medications.  Continue collaboration with PATRICIA for medical co-morbidities as indicated.  Continue discussion with CM/SW to assist with obtaining collateral, disposition planning, and the implementation of patient-centered individualized plan of care.  Continue frequent safety checks and vitals per unit protocol.    Risks, benefits and possible side effects of Medications: Risks, benefits, and possible side effects of medications have previously been explained. No new medications at this time.      Legal status: 201    Disposition: to be determined, pending SOAR application for potential group home placement. OT Cognitive Evaluation completed: \"Pt would benefit from discharge to a supportive environment that can provide checks for safety and compliance with IADLs. \"   Although patient's mood has stabilized, they are currently awaiting group home placement.  Their ability to recognize safety hazards take medications and participate in IADLs are significantly impaired by their cognitive deficits compounded by their chronic mental health needs and would be at risk for significant decompensation without the structure and support of a group home setting.      No associated orders from this encounter found during lookback period of 72 hours.  "

## 2025-01-16 NOTE — PROGRESS NOTES
01/16/25 0839   Team Meeting   Meeting Type Daily Rounds   Team Members Present   Team Members Present Nurse;Physician;   Physician Team Member Ary   Nursing Team Member Wicho   Care Management Team Member Noa   Patient/Family Present   Patient Present No   Patient's Family Present No

## 2025-01-16 NOTE — PLAN OF CARE
Problem: Ineffective Coping  Goal: Participates in unit activities  Description: Interventions:  - Provide therapeutic environment   - Provide required programming   - Redirect inappropriate behaviors   Outcome: Not Progressing     Problem: Depression  Goal: Treatment Goal: Demonstrate behavioral control of depressive symptoms, verbalize feelings of improved mood/affect, and adopt new coping skills prior to discharge  Outcome: Progressing  Goal: Verbalize thoughts and feelings  Description: Interventions:  - Assess and re-assess patient's level of risk   - Engage patient in 1:1 interactions, daily, for a minimum of 15 minutes   - Encourage patient to express feelings, fears, frustrations, hopes   Outcome: Progressing  Goal: Refrain from harming self  Description: Interventions:  - Monitor patient closely, per order   - Supervise medication ingestion, monitor effects and side effects   Outcome: Progressing  Goal: Refrain from isolation  Description: Interventions:  - Develop a trusting relationship   - Encourage socialization   Outcome: Progressing     Problem: DISCHARGE PLANNING - CARE MANAGEMENT  Goal: Discharge to post-acute care or home with appropriate resources  Description: INTERVENTIONS:  - Conduct assessment to determine patient/family and health care team treatment goals, and need for post-acute services based on payer coverage, community resources, and patient preferences, and barriers to discharge  - Address psychosocial, clinical, and financial barriers to discharge as identified in assessment in conjunction with the patient/family and health care team  - Arrange appropriate level of post-acute services according to patient’s   needs and preference and payer coverage in collaboration with the physician and health care team  - Communicate with and update the patient/family, physician, and health care team regarding progress on the discharge plan  - Arrange appropriate transportation to post-acute  venues  Outcome: Progressing     Problem: Knowledge Deficit  Goal: Patient/family/caregiver demonstrates understanding of disease process, treatment plan, medications, and discharge instructions  Description: Complete learning assessment and assess knowledge base.  Interventions:  - Provide teaching at level of understanding  - Provide teaching via preferred learning methods  Outcome: Progressing     Problem: SLEEP DISTURBANCE  Goal: Will exhibit normal sleeping pattern  Description: Interventions:  -  Assess the patients sleep pattern, noting recent changes  - Administer medication as ordered  - Decrease environmental stimuli, including noise, as appropriate during the night  - Encourage the patient to actively participate in unit groups and or exercise during the day to enhance ability to achieve adequate sleep at night  - Assess the patient, in the morning, encouraging a description of sleep experience  Outcome: Progressing

## 2025-01-16 NOTE — NURSING NOTE
Pt pleasant, visible but withdrawn to self. Denies psych symptoms, no scheduled medications at this time . Patient denies any unmet needs at this time.

## 2025-01-16 NOTE — NURSING NOTE
Pt pleasant and cooperative on approach. Withdrawn to room all morning, visible for meals. Denies SI/HI/AH/VH. Compliant with medications. No current unmet needs.

## 2025-01-17 PROCEDURE — 99232 SBSQ HOSP IP/OBS MODERATE 35: CPT | Performed by: PSYCHIATRY & NEUROLOGY

## 2025-01-17 RX ADMIN — LEVOTHYROXINE SODIUM 37.5 MCG: 125 TABLET ORAL at 06:23

## 2025-01-17 RX ADMIN — FAMOTIDINE 20 MG: 20 TABLET ORAL at 08:15

## 2025-01-17 RX ADMIN — SERTRALINE HYDROCHLORIDE 150 MG: 100 TABLET ORAL at 08:15

## 2025-01-17 RX ADMIN — ATORVASTATIN CALCIUM 10 MG: 10 TABLET, FILM COATED ORAL at 17:31

## 2025-01-17 RX ADMIN — ARIPIPRAZOLE 5 MG: 5 TABLET ORAL at 08:15

## 2025-01-17 RX ADMIN — CYANOCOBALAMIN TAB 1000 MCG 1000 MCG: 1000 TAB at 08:15

## 2025-01-17 RX ADMIN — FAMOTIDINE 20 MG: 20 TABLET ORAL at 17:31

## 2025-01-17 RX ADMIN — SENNOSIDES AND DOCUSATE SODIUM 2 TABLET: 50; 8.6 TABLET ORAL at 17:31

## 2025-01-17 RX ADMIN — Medication 2500 UNITS: at 08:15

## 2025-01-17 NOTE — PROGRESS NOTES
01/17/25 0840   Team Meeting   Meeting Type Daily Rounds   Team Members Present   Team Members Present Physician;Nurse;   Physician Team Member Ary   [facility] Member Forrest   Patient/Family Present   Patient Present No   Patient's Family Present No     Pt med/meal compliant, pleasant, calm, cooperative. Discharge pending placement.

## 2025-01-17 NOTE — SOCIAL WORK
"After several attempts Cm and Pt were able to speak with someone at the [facility] office. They confirmed they received fax of proof of [employer] account on the appropriate date.   [facility] office reported they were going to report this to \"Mrs. Sanderson\" The came worker in charge of Pt's case. They placed Cm and Pt on a brief hold.     [facility] rep was able to provide Mrs. Sanderson's direct line [phone] Ext. [phone].       Cm and Pt were again disconnected and were not transferred.   Cm called back and was directed straight to Mrs. Sanderson's voicemail. Cm left a detailed voicemail regarding Pt's needs.     No returned phone call before the end of the day.     "

## 2025-01-17 NOTE — ASSESSMENT & PLAN NOTE
"Patient continues to do well and the plan is to continue with the current treatment plan as mentioned below with no changes.    Patient continues to be pending group home placement. Otherwise no clinical significant change.   was able to contact Social Security office again and recent confirmation of patient's money being in [employer] account    Medication regimen as follows, no changes as of 1/17/2025:  Abilify 5 mg daily as mood adjunct   Zoloft 150 mg daily for depression and anxiety  Continue to encourage participation in group therapy, milieu therapy and occupational therapy.  Continue to assess for side effects of medications.  Continue collaboration with PATRICIA for medical co-morbidities as indicated.  Continue discussion with CM/SW to assist with obtaining collateral, disposition planning, and the implementation of patient-centered individualized plan of care.  Continue frequent safety checks and vitals per unit protocol.    Risks, benefits and possible side effects of Medications: Risks, benefits, and possible side effects of medications have previously been explained. No new medications at this time.      Legal status: 201    Disposition: to be determined, pending SOAR application for potential group home placement. OT Cognitive Evaluation completed: \"Pt would benefit from discharge to a supportive environment that can provide checks for safety and compliance with IADLs. \"   Although patient's mood has stabilized, they are currently awaiting group home placement.  Their ability to recognize safety hazards take medications and participate in IADLs are significantly impaired by their cognitive deficits compounded by their chronic mental health needs and would be at risk for significant decompensation without the structure and support of a group home setting.      No associated orders from this encounter found during lookback period of 72 hours.  "

## 2025-01-17 NOTE — PLAN OF CARE
Pt has had a decrease in group attendance. Pt still attending some groups sporadically and is appropriate when in attendance.

## 2025-01-17 NOTE — PROGRESS NOTES
"Progress Note - Behavioral Health   Name: Franco Roberson 39 y.o. male I MRN: [id number]   Unit/Bed#: -01 I Date of Admission: 5/14/2024   Date of Service: 1/17/2025 I Hospital Day: 248         Assessment & Plan  MDD (major depressive disorder), recurrent severe, without psychosis (HCC)  Patient continues to do well and the plan is to continue with the current treatment plan as mentioned below with no changes.    Patient continues to be pending group home placement. Otherwise no clinical significant change.   was able to contact Social Security office again and recent confirmation of patient's money being in [facility] account    Medication regimen as follows, no changes as of 1/17/2025:  Abilify 5 mg daily as mood adjunct   Zoloft 150 mg daily for depression and anxiety  Continue to encourage participation in group therapy, milieu therapy and occupational therapy.  Continue to assess for side effects of medications.  Continue collaboration with [facility] for medical co-morbidities as indicated.  Continue discussion with CM/SW to assist with obtaining collateral, disposition planning, and the implementation of patient-centered individualized plan of care.  Continue frequent safety checks and vitals per unit protocol.    Risks, benefits and possible side effects of Medications: Risks, benefits, and possible side effects of medications have previously been explained. No new medications at this time.      Legal status: 201    Disposition: to be determined, pending SOAR application for potential group home placement. OT Cognitive Evaluation completed: \"Pt would benefit from discharge to a supportive environment that can provide checks for safety and compliance with IADLs. \"   Although patient's mood has stabilized, they are currently awaiting group home placement.  Their ability to recognize safety hazards take medications and participate in IADLs are significantly impaired by their cognitive deficits compounded by " their chronic mental health needs and would be at risk for significant decompensation without the structure and support of a group home setting.      No associated orders from this encounter found during lookback period of 72 hours.  Autism spectrum disorder  Continue supportive care    No associated orders from this encounter found during lookback period of 72 hours.          Recommended Treatment:     Treatment plan and medication changes discussed and per the attending physician the plan is:     1.Continue with group therapy, milieu therapy and occupational therapy  2.Behavioral Health checks every 15 minutes  3.Continue frequent safety checks and vitals per unit protocol  4.Continue with [facility] medical management as indicated  5.Continue with current medication regimen  6.Will review labs in the a.m.  7.Disposition Planning: Discharge planning and efforts remain ongoing     Planned medication and treatment changes:    All current active medications have been reviewed  Encourage group therapy, milieu therapy and occupational therapy  Behavioral Health checks for safety monitoring  Continue treatment with group therapy, milieu therapy and occupational therapy  Discharge planning  This patient is stable and ready for discharge, however, even at baseline, patient continues with poor insight, judgement and coping that pose a risk to patient in a homeless shelter, or otherwise unsupervised setting. Case management is assertively/actively working on securing the necessary level of care.  Continue current medications:    Current medications:  Current Facility-Administered Medications   Medication Dose Route Frequency Provider Last Rate    acetaminophen  650 mg Oral Q6H PRN Basilio Brown MD      acetaminophen  650 mg Oral Q4H PRN Basilio Brown MD      acetaminophen  975 mg Oral Q6H PRN Basilio Brown MD      aluminum-magnesium hydroxide-simethicone  30 mL Oral Q4H PRN Basilio Brown MD       ARIPiprazole  5 mg Oral Daily Basilio Panda MD      Artificial Tears  1 drop Both Eyes Q3H PRN Basilio Brown MD      atorvastatin  10 mg Oral Daily With Dinner WALLY Baptiste      benztropine  1 mg Intramuscular Q4H PRN Max 6/day Basilio Brown MD      benztropine  1 mg Oral Q4H PRN Max 6/day Basilio Brown MD      Cholecalciferol  2,500 Units Oral Daily Dustin Mcallister MD      cyanocobalamin  1,000 mcg Oral Daily WALLY Baptiste      Diclofenac Sodium  2 g Topical 4x Daily PRN WALLY Baptiste      hydrOXYzine HCL  50 mg Oral Q6H PRN Max 4/day Basilio Brown MD      Or    diphenhydrAMINE  50 mg Intramuscular Q6H PRN Basilio Brown MD      diphenhydrAMINE-zinc acetate   Topical Daily PRN Raj Guerrero MD      famotidine  20 mg Oral BID WALLY Baptiste      hydrOXYzine HCL  100 mg Oral Q6H PRN Max 4/day Basilio Brown MD      Or    LORazepam  2 mg Intramuscular Q6H PRN Basilio Brown MD      hydrOXYzine HCL  25 mg Oral Q6H PRN Max 4/day Basilio Brown MD      levothyroxine  37.5 mcg Oral Early Morning WALLY Baptiste      melatonin  6 mg Oral HS PRN WALLY Gomez      methocarbamol  500 mg Oral Q6H PRN WALLY Baptiste      OLANZapine  5 mg Oral Q4H PRN Max 3/day Basilio Brown MD      Or    OLANZapine  2.5 mg Intramuscular Q4H PRN Max 3/day Basilio Brown MD      OLANZapine  5 mg Oral Q3H PRN Max 3/day Basilio Brown MD      Or    OLANZapine  5 mg Intramuscular Q3H PRN Max 3/day Basilio Brown MD      OLANZapine  2.5 mg Oral Q4H PRN Max 6/day Basilio Brown MD      polyethylene glycol  17 g Oral Daily PRN Basilio Brown MD      propranolol  10 mg Oral Q8H PRN Basilio Brown MD      senna-docusate sodium  2 tablet Oral After Dinner Dustin Mcallister MD      sertraline  150 mg Oral Daily WALLY Gomez      traZODone  50 mg Oral HS PRN  "Basilio Brown MD         Risks / Benefits of Treatment:    Risks, benefits, and possible side effects of medications explained to patient and patient verbalizes understanding and agreement for treatment.    Subjective:    Behavior over the last 24 hours: unchanged.     Per staff, Franco remains meal and medications compliant.  He is pleasant calm and cooperative.   He is attending groups sporadically.      Franco was seen in his room for psychiatric follow up today.  He brightened on approach today.  He reports that his mood is \"okay\" today.  He reports still feeling \"drained\" from the cold he had last week.  He reports sleeping a lot yesterday.  He hopes to make it to a group today and was encouraged to do so.  Hygiene remains poor.  He reports some mild depression related to ongoing hospitalization.  He is working with  to address his social security which is needed prior to discharge.  He remains hopeful and goal oriented.  He denies any suicidal or homicidal ideation intent or plan.  He denies hallucinations and does not present as internally preoccupied.  He is compliant with medications and denies side effects.     Sleep: normal  Appetite: normal  Medication side effects: No   ROS: no complaints    Mental Status Evaluation:    Appearance:  disheveled, poor hygiene   Behavior:  pleasant, cooperative, calm   Speech:  normal rate and volume   Mood:  slightly less depressed   Affect:  brighter   Thought Process:  goal directed, linear   Associations: intact associations   Thought Content:  no overt delusions   Perceptual Disturbances: none   Risk Potential: Suicidal ideation - None  Homicidal ideation - None  Potential for aggression - No   Sensorium:  oriented to person, place, and time/date   Memory:  recent memory intact   Consciousness:  alert and awake   Attention/Concentration: attention span and concentration are age appropriate   Insight:  limited   Judgment: limited   Gait/Station: normal " gait/station   Motor Activity: no abnormal movements     Vital signs in last 24 hours:    Temp:  [96.9 °F (36.1 °C)-97.5 °F (36.4 °C)] 96.9 °F (36.1 °C)  HR:  [73-81] 73  BP: (114-120)/(56-78) 120/78  Resp:  [17] 17  SpO2:  [97 %-98 %] 97 %  O2 Device: None (Room air)         Laboratory results: I have personally reviewed all pertinent laboratory/tests results    Results from the past 24 hours: No results found for this or any previous visit (from the past 24 hours).  Most Recent Labs:   Lab Results   Component Value Date    WBC 6.75 11/12/2024    RBC 5.06 11/12/2024    HGB 15.9 11/12/2024    HCT 46.2 11/12/2024     11/12/2024    RDW 12.8 11/12/2024    NEUTROABS 4.15 11/12/2024    SODIUM 139 11/12/2024    K 4.0 11/12/2024     11/12/2024    CO2 30 11/12/2024    BUN 17 11/12/2024    CREATININE 0.99 11/12/2024    GLUC 85 11/12/2024    CALCIUM 9.4 11/12/2024    AST 23 11/12/2024    ALT 32 11/12/2024    ALKPHOS 73 11/12/2024    TP 8.1 11/12/2024    ALB 4.4 11/12/2024    TBILI 0.54 11/12/2024    CHOLESTEROL 151 11/26/2024    HDL 39 (L) 11/26/2024    TRIG 170 (H) 11/26/2024    LDLCALC 78 11/26/2024    NONHDLC 112 11/26/2024    FFU9YNJYNUDX 2.532 11/12/2024    FREET4 0.56 (L) 09/10/2024    SYPHILISAB Non-reactive 05/15/2024       Suicide/Homicide Risk Assessment:    Risk of Harm to Self:   Nursing Suicide Risk Assessment Last 24 hours: C-SSRS Risk (Since Last Contact)  Calculated C-SSRS Risk Score (Since Last Contact): No Risk Indicated  Current Specific Risk Factors include: diagnosis of mood disorder  Protective Factors: no current suicidal ideation, ability to communicate with staff on the unit, able to contract for safety on the unit, taking medications as ordered on the unit, improved mood, responds to redirection  Based on today's assessment, Franco presents the following risk of harm to self: low    Risk of Harm to Others:  Nursing Homicide Risk Assessment: Violence Risk to Others: Denies within past 6  months  Current Specific Risk Factors include: social difficulties  Protective Factors: no current homicidal ideation, compliant with medications on the unit as ordered, compliant with unit milieu, follows staff redirection  Based on today's assessment, Franco presents the following risk of harm to others: low    The following interventions are recommended: Behavioral Health checks for safety monitoring, continued hospitalization on locked unit    Progress Toward Goals: progressing, depression is improving, continues with limited insight and poor adls, taking medications, calm, pleasant and cooperative on the unit, depression improving    Counseling / Coordination of Care:    Total floor / unit time spent today 30 minutes. Greater than 50% of total time was spent with the patient and / or family counseling and / or coordination of care. A description of counseling / coordination of care:  Patient's progress discussed with staff in treatment team meeting.    Administrative Statements   I have spent a total time of 30 minutes in caring for this patient on the day of the visit/encounter including Diagnostic results, Documenting in the medical record, Reviewing / ordering tests, medicine, procedures  , Obtaining or reviewing history  , and Communicating with other healthcare professionals .    WALLY Gomez 01/17/25

## 2025-01-17 NOTE — NURSING NOTE
Pt visible walking the halls with peers, otherwise withdrawn to his room. Pt had phone call with social security which he is hopeful means he will be able to be discharged soon. Denies SI/HI/AH/VH at this time. Medication and meal compliant. Denies any unmet needs or complaints.

## 2025-01-17 NOTE — NURSING NOTE
Patient isolative to room, but out for needs. Patient has sad affect, but brightens on approach. Patient denies all psych symptoms. Q15 observation maintained.

## 2025-01-17 NOTE — PLAN OF CARE
Problem: Depression  Goal: Treatment Goal: Demonstrate behavioral control of depressive symptoms, verbalize feelings of improved mood/affect, and adopt new coping skills prior to discharge  Outcome: Progressing  Goal: Verbalize thoughts and feelings  Description: Interventions:  - Assess and re-assess patient's level of risk   - Engage patient in 1:1 interactions, daily, for a minimum of 15 minutes   - Encourage patient to express feelings, fears, frustrations, hopes   Outcome: Progressing  Goal: Refrain from harming self  Description: Interventions:  - Monitor patient closely, per order   - Supervise medication ingestion, monitor effects and side effects   Outcome: Progressing  Goal: Refrain from isolation  Description: Interventions:  - Develop a trusting relationship   - Encourage socialization   Outcome: Progressing     Problem: Anxiety  Goal: Anxiety is at manageable level  Description: Interventions:  - Assess and monitor patient's anxiety level.   - Monitor for signs and symptoms (heart palpitations, chest pain, shortness of breath, headaches, nausea, feeling jumpy, restlessness, irritable, apprehensive).   - Collaborate with interdisciplinary team and initiate plan and interventions as ordered.  - [place] patient to unit/surroundings  - Explain treatment plan  - Encourage participation in care  - Encourage verbalization of concerns/fears  - Identify coping mechanisms  - Assist in developing anxiety-reducing skills  - Administer/offer alternative therapies  - Limit or eliminate stimulants  Outcome: Progressing     Problem: DISCHARGE PLANNING - CARE MANAGEMENT  Goal: Discharge to post-acute care or home with appropriate resources  Description: INTERVENTIONS:  - Conduct assessment to determine patient/family and health care team treatment goals, and need for post-acute services based on payer coverage, community resources, and patient preferences, and barriers to discharge  - Address psychosocial, clinical,  and financial barriers to discharge as identified in assessment in conjunction with the patient/family and health care team  - Arrange appropriate level of post-acute services according to patient’s   needs and preference and payer coverage in collaboration with the physician and health care team  - Communicate with and update the patient/family, physician, and health care team regarding progress on the discharge plan  - Arrange appropriate transportation to post-acute venues  Outcome: Progressing     Problem: Knowledge Deficit  Goal: Patient/family/caregiver demonstrates understanding of disease process, treatment plan, medications, and discharge instructions  Description: Complete learning assessment and assess knowledge base.  Interventions:  - Provide teaching at level of understanding  - Provide teaching via preferred learning methods  Outcome: Progressing     Problem: SLEEP DISTURBANCE  Goal: Will exhibit normal sleeping pattern  Description: Interventions:  -  Assess the patients sleep pattern, noting recent changes  - Administer medication as ordered  - Decrease environmental stimuli, including noise, as appropriate during the night  - Encourage the patient to actively participate in unit groups and or exercise during the day to enhance ability to achieve adequate sleep at night  - Assess the patient, in the morning, encouraging a description of sleep experience  Outcome: Progressing

## 2025-01-17 NOTE — PROGRESS NOTES
01/17/25 0833   Team Meeting   Meeting Type Daily Rounds   Team Members Present   Team Members Present Physician;Nurse;   Physician Team Member Ary   [facility]   Patient/Family Present   Patient Present No   Patient's Family Present No

## 2025-01-18 PROCEDURE — 99232 SBSQ HOSP IP/OBS MODERATE 35: CPT | Performed by: PSYCHIATRY & NEUROLOGY

## 2025-01-18 RX ADMIN — LEVOTHYROXINE SODIUM 37.5 MCG: 125 TABLET ORAL at 06:13

## 2025-01-18 RX ADMIN — FAMOTIDINE 20 MG: 20 TABLET ORAL at 08:33

## 2025-01-18 RX ADMIN — ATORVASTATIN CALCIUM 10 MG: 10 TABLET, FILM COATED ORAL at 17:51

## 2025-01-18 RX ADMIN — CYANOCOBALAMIN TAB 1000 MCG 1000 MCG: 1000 TAB at 08:33

## 2025-01-18 RX ADMIN — SENNOSIDES AND DOCUSATE SODIUM 2 TABLET: 50; 8.6 TABLET ORAL at 17:51

## 2025-01-18 RX ADMIN — Medication 2500 UNITS: at 08:33

## 2025-01-18 RX ADMIN — ARIPIPRAZOLE 5 MG: 5 TABLET ORAL at 08:33

## 2025-01-18 RX ADMIN — FAMOTIDINE 20 MG: 20 TABLET ORAL at 17:51

## 2025-01-18 RX ADMIN — SERTRALINE HYDROCHLORIDE 150 MG: 100 TABLET ORAL at 08:33

## 2025-01-18 NOTE — PLAN OF CARE
Problem: Ineffective Coping  Goal: Participates in unit activities  Description: Interventions:  - Provide therapeutic environment   - Provide required programming   - Redirect inappropriate behaviors   Outcome: Progressing     Problem: Depression  Goal: Treatment Goal: Demonstrate behavioral control of depressive symptoms, verbalize feelings of improved mood/affect, and adopt new coping skills prior to discharge  Outcome: Progressing  Goal: Verbalize thoughts and feelings  Description: Interventions:  - Assess and re-assess patient's level of risk   - Engage patient in 1:1 interactions, daily, for a minimum of 15 minutes   - Encourage patient to express feelings, fears, frustrations, hopes   Outcome: Progressing  Goal: Refrain from harming self  Description: Interventions:  - Monitor patient closely, per order   - Supervise medication ingestion, monitor effects and side effects   Outcome: Progressing  Goal: Refrain from isolation  Description: Interventions:  - Develop a trusting relationship   - Encourage socialization   Outcome: Progressing     Problem: Anxiety  Goal: Anxiety is at manageable level  Description: Interventions:  - Assess and monitor patient's anxiety level.   - Monitor for signs and symptoms (heart palpitations, chest pain, shortness of breath, headaches, nausea, feeling jumpy, restlessness, irritable, apprehensive).   - Collaborate with interdisciplinary team and initiate plan and interventions as ordered.  - [place] patient to unit/surroundings  - Explain treatment plan  - Encourage participation in care  - Encourage verbalization of concerns/fears  - Identify coping mechanisms  - Assist in developing anxiety-reducing skills  - Administer/offer alternative therapies  - Limit or eliminate stimulants  Outcome: Progressing     Problem: DISCHARGE PLANNING - CARE MANAGEMENT  Goal: Discharge to post-acute care or home with appropriate resources  Description: INTERVENTIONS:  - Conduct assessment to  determine patient/family and health care team treatment goals, and need for post-acute services based on payer coverage, community resources, and patient preferences, and barriers to discharge  - Address psychosocial, clinical, and financial barriers to discharge as identified in assessment in conjunction with the patient/family and health care team  - Arrange appropriate level of post-acute services according to patient’s   needs and preference and payer coverage in collaboration with the physician and health care team  - Communicate with and update the patient/family, physician, and health care team regarding progress on the discharge plan  - Arrange appropriate transportation to post-acute venues  Outcome: Progressing     Problem: Knowledge Deficit  Goal: Patient/family/caregiver demonstrates understanding of disease process, treatment plan, medications, and discharge instructions  Description: Complete learning assessment and assess knowledge base.  Interventions:  - Provide teaching at level of understanding  - Provide teaching via preferred learning methods  Outcome: Progressing     Problem: SLEEP DISTURBANCE  Goal: Will exhibit normal sleeping pattern  Description: Interventions:  -  Assess the patients sleep pattern, noting recent changes  - Administer medication as ordered  - Decrease environmental stimuli, including noise, as appropriate during the night  - Encourage the patient to actively participate in unit groups and or exercise during the day to enhance ability to achieve adequate sleep at night  - Assess the patient, in the morning, encouraging a description of sleep experience  Outcome: Progressing

## 2025-01-18 NOTE — ASSESSMENT & PLAN NOTE
"Patient continues to do well and the plan is to continue with the current treatment plan as mentioned below with no changes.    Patient continues to be pending group home placement. Otherwise no clinical significant change.   was able to contact Social Security office again and recent confirmation of patient's money being in [facility] account    Medication regimen as follows, no changes as of 1/18/2025:  Abilify 5 mg daily as mood adjunct   Zoloft 150 mg daily for depression and anxiety  Continue to encourage participation in group therapy, milieu therapy and occupational therapy.  Continue to assess for side effects of medications.  Continue collaboration with PATRICIA for medical co-morbidities as indicated.  Continue discussion with CM/SW to assist with obtaining collateral, disposition planning, and the implementation of patient-centered individualized plan of care.  Continue frequent safety checks and vitals per unit protocol.    Risks, benefits and possible side effects of Medications: Risks, benefits, and possible side effects of medications have previously been explained. No new medications at this time.      Legal status: 201    Disposition: to be determined, pending SOAR application for potential group home placement. OT Cognitive Evaluation completed: \"Pt would benefit from discharge to a supportive environment that can provide checks for safety and compliance with IADLs. \"   Although patient's mood has stabilized, they are currently awaiting group home placement.  Their ability to recognize safety hazards take medications and participate in IADLs are significantly impaired by their cognitive deficits compounded by their chronic mental health needs and would be at risk for significant decompensation without the structure and support of a group home setting.      No associated orders from this encounter found during lookback period of 72 hours.  "

## 2025-01-18 NOTE — NURSING NOTE
Patient has been in his room all evening, withdrawn to himself. He denies S.I.H.I.A/H V/H. Pleasant in interaction with staff.

## 2025-01-18 NOTE — PROGRESS NOTES
"Progress Note - Behavioral Health   Name: Franco Roberson 39 y.o. male I MRN: [id number]  Unit/Bed#: -01 I Date of Admission: 5/14/2024   Date of Service: 1/18/2025 I Hospital Day: 249     Assessment & Plan  MDD (major depressive disorder), recurrent severe, without psychosis (HCC)  Patient continues to do well and the plan is to continue with the current treatment plan as mentioned below with no changes.    Patient continues to be pending group home placement. Otherwise no clinical significant change.   was able to contact Social Security office again and recent confirmation of patient's money being in [facility] account    Medication regimen as follows, no changes as of 1/18/2025:  Abilify 5 mg daily as mood adjunct   Zoloft 150 mg daily for depression and anxiety  Continue to encourage participation in group therapy, milieu therapy and occupational therapy.  Continue to assess for side effects of medications.  Continue collaboration with [facility] for medical co-morbidities as indicated.  Continue discussion with CM/SW to assist with obtaining collateral, disposition planning, and the implementation of patient-centered individualized plan of care.  Continue frequent safety checks and vitals per unit protocol.    Risks, benefits and possible side effects of Medications: Risks, benefits, and possible side effects of medications have previously been explained. No new medications at this time.      Legal status: 201    Disposition: to be determined, pending SOAR application for potential group home placement. OT Cognitive Evaluation completed: \"Pt would benefit from discharge to a supportive environment that can provide checks for safety and compliance with IADLs. \"   Although patient's mood has stabilized, they are currently awaiting group home placement.  Their ability to recognize safety hazards take medications and participate in IADLs are significantly impaired by their cognitive deficits compounded by their " "chronic mental health needs and would be at risk for significant decompensation without the structure and support of a group home setting.      No associated orders from this encounter found during lookback period of 72 hours.  Autism spectrum disorder  Continue supportive care    No associated orders from this encounter found during lookback period of 72 hours.        Subjective: All documentation including nursing notes, medication history to ensure medication adherence on the unit, labs, and vitals were reviewed. Franco was evaluated this morning for continuity of care and no acute distress noted throughout the evaluation. Over the past 24 hours per nursing report, Franco has been cooperative on the unit and compliant with medications. No acute events overnight.     Today, Franco is consenting for safety on the unit. Franco reports feeling \"good.\" Franco notes having good sleep. Franco states having a slightly decreased appetite. Franco has been taking the medications as prescribed and reporting no side effects.    Franco denies suicidal ideations. Franco denies homicidal ideations. Regarding hallucinations, Franco denies auditory or visual hallucinations. He denies other complaints at this time. States his goal for today is to rest as it is the weekend.     PRNs overnight: none  VS: Reviewed, within normal limits    Progress Toward Goals: slow improvement    Psychiatric Review of Systems:  Sleep: normal  Appetite: decreased  Medication side effects: No   ROS: no complaints    Vital signs in last 24 hours:  Temp:  [97 °F (36.1 °C)-97.4 °F (36.3 °C)] 97.4 °F (36.3 °C)  HR:  [73-77] 77  BP: (107-114)/(64-66) 107/64  Resp:  [16-17] 17  SpO2:  [96 %-98 %] 98 %  O2 Device: None (Room air)    Laboratory results:  I have personally reviewed all pertinent laboratory/tests results.  No results found for this or any previous visit (from the past 48 hours).      Mental Status Evaluation:    Appearance:  casually dressed, " "looks older than stated age   Behavior:  cooperative, calm, guarded   Speech:  normal rate, soft   Mood:  \"good\"   Affect:  constricted   Thought Process:  linear   Associations: concrete associations   Thought Content:  no overt delusions   Perceptual Disturbances: Denies auditory or visual hallucinations and Does not appear to be responding to internal stimuli   Risk Potential: Suicidal ideation - None at present, contracts for safety on the unit, would talk to staff if not feeling safe on the unit  Homicidal ideation - None at present  Potential for aggression - Not at present   Sensorium:  oriented to person, place, and time/date   Memory:  recent and remote memory grossly intact   Consciousness:  alert and awake   Attention/Concentration: attention span and concentration appear shorter than expected for age   Insight:  limited   Judgment: limited   Gait/Station: normal gait/station   Motor Activity: no abnormal movements       Current Medications:  Current Facility-Administered Medications   Medication Dose Route Frequency Provider Last Rate    acetaminophen  650 mg Oral Q6H PRN Basilio Brown MD      acetaminophen  650 mg Oral Q4H PRN Basilio Brown MD      acetaminophen  975 mg Oral Q6H PRN Basilio Brown MD      aluminum-magnesium hydroxide-simethicone  30 mL Oral Q4H PRN Basilio Brown MD      ARIPiprazole  5 mg Oral Daily Basilio Panda MD      Artificial Tears  1 drop Both Eyes Q3H PRN Basilio Brown MD      atorvastatin  10 mg Oral Daily With Dinner WALLY Baptiste      benztropine  1 mg Intramuscular Q4H PRN Max 6/day Basilio Brown MD      benztropine  1 mg Oral Q4H PRN Max 6/day Basilio Brown MD      Cholecalciferol  2,500 Units Oral Daily Dustin Mcallister MD      cyanocobalamin  1,000 mcg Oral Daily WALLY Baptiste      Diclofenac Sodium  2 g Topical 4x Daily PRN WALLY Baptiste      hydrOXYzine HCL  50 mg Oral Q6H PRN " Max 4/day Basilio Brown MD      Or    diphenhydrAMINE  50 mg Intramuscular Q6H PRN Basilio Brown MD      diphenhydrAMINE-zinc acetate   Topical Daily PRN Raj Guerrero MD      famotidine  20 mg Oral BID WALLY Baptiste      hydrOXYzine HCL  100 mg Oral Q6H PRN Max 4/day Basilio Brown MD      Or    LORazepam  2 mg Intramuscular Q6H PRN Basilio Brown MD      hydrOXYzine HCL  25 mg Oral Q6H PRN Max 4/day Basilio Brown MD      levothyroxine  37.5 mcg Oral Early Morning WALLY Baptiste      melatonin  6 mg Oral HS PRN WALLY Gomez      methocarbamol  500 mg Oral Q6H PRN WALLY Baptiste      OLANZapine  5 mg Oral Q4H PRN Max 3/day Basilio Brown MD      Or    OLANZapine  2.5 mg Intramuscular Q4H PRN Max 3/day Basilio Brown MD      OLANZapine  5 mg Oral Q3H PRN Max 3/day Basilio Brown MD      Or    OLANZapine  5 mg Intramuscular Q3H PRN Max 3/day Basilio Brown MD      OLANZapine  2.5 mg Oral Q4H PRN Max 6/day Basilio Brown MD      polyethylene glycol  17 g Oral Daily PRN Basilio Brown MD      propranolol  10 mg Oral Q8H PRN Basilio Brown MD      senna-docusate sodium  2 tablet Oral After Dinner Dustin Mcallister MD      sertraline  150 mg Oral Daily WALLY Gomez      traZODone  50 mg Oral HS PRN Basilio Brown MD         Behavioral Health Medications: All current active meds have been reviewed. Changes as in plan section above.  Risks, benefits and possible side effects of Medications:   Risks, benefits, and possible side effects of medications explained to patient and patient verbalizes understanding.      Counseling / Coordination of Care:  Patient's progress discussed with staff in treatment team meeting.  Medications, treatment progress and treatment plan reviewed with patient.    Rosangela Robertson DO  Psychiatry Resident, PGY-2    This note was completed in part utilizing  Dragon dictation Software. Grammatical, translation, syntax errors, random word insertions, spelling mistakes, and incomplete sentences may be an occasional consequence of this system secondary to software limitations with voice recognition, ambient noise, and hardware issues. If you have any questions or concerns about the content, text, or information contained within the body of this dictation, please contact the provider for clarification.

## 2025-01-19 VITALS
HEIGHT: 72 IN | RESPIRATION RATE: 17 BRPM | OXYGEN SATURATION: 96 % | BODY MASS INDEX: 38.95 KG/M2 | DIASTOLIC BLOOD PRESSURE: 68 MMHG | TEMPERATURE: 97.5 F | SYSTOLIC BLOOD PRESSURE: 125 MMHG | WEIGHT: 287.6 LBS | HEART RATE: 83 BPM

## 2025-01-19 PROCEDURE — 99232 SBSQ HOSP IP/OBS MODERATE 35: CPT | Performed by: PSYCHIATRY & NEUROLOGY

## 2025-01-19 RX ADMIN — ATORVASTATIN CALCIUM 10 MG: 10 TABLET, FILM COATED ORAL at 17:51

## 2025-01-19 RX ADMIN — FAMOTIDINE 20 MG: 20 TABLET ORAL at 17:51

## 2025-01-19 RX ADMIN — SENNOSIDES AND DOCUSATE SODIUM 2 TABLET: 50; 8.6 TABLET ORAL at 17:51

## 2025-01-19 RX ADMIN — CYANOCOBALAMIN TAB 1000 MCG 1000 MCG: 1000 TAB at 08:46

## 2025-01-19 RX ADMIN — Medication 2500 UNITS: at 08:46

## 2025-01-19 RX ADMIN — FAMOTIDINE 20 MG: 20 TABLET ORAL at 08:46

## 2025-01-19 RX ADMIN — SERTRALINE HYDROCHLORIDE 150 MG: 100 TABLET ORAL at 08:46

## 2025-01-19 RX ADMIN — ARIPIPRAZOLE 5 MG: 5 TABLET ORAL at 08:46

## 2025-01-19 RX ADMIN — LEVOTHYROXINE SODIUM 37.5 MCG: 125 TABLET ORAL at 06:23

## 2025-01-19 NOTE — NURSING NOTE
Patient came out into the milieu for snack this evening but otherwise has been keeping to himself in his room. He denies S.I.H.I.A/H V/H

## 2025-01-19 NOTE — NURSING NOTE
Pt is withdrawn to room. Pt reports he is losing hope when it comes to his discharge. Pt given encouragement and emotional support. Denies SI/HI/AH/VH at this time. Med and meal compliant.

## 2025-01-19 NOTE — ASSESSMENT & PLAN NOTE
"Patient continues to do well and the plan is to continue with the current treatment plan as mentioned below with no changes.    Patient continues to be pending group home placement. Otherwise no clinical significant change.   was able to contact Social Security office again and recent confirmation of patient's money being in [facility] account    Medication regimen as follows, no changes as of 1/19/2025:  Abilify 5 mg daily as mood adjunct   Zoloft 150 mg daily for depression and anxiety  Continue to encourage participation in group therapy, milieu therapy and occupational therapy.  Continue to assess for side effects of medications.  Continue collaboration with PATRICIA for medical co-morbidities as indicated.  Continue discussion with CM/SW to assist with obtaining collateral, disposition planning, and the implementation of patient-centered individualized plan of care.  Continue frequent safety checks and vitals per unit protocol.    Risks, benefits and possible side effects of Medications: Risks, benefits, and possible side effects of medications have previously been explained. No new medications at this time.      Legal status: 201    Disposition: to be determined, pending SOAR application for potential group home placement. OT Cognitive Evaluation completed: \"Pt would benefit from discharge to a supportive environment that can provide checks for safety and compliance with IADLs. \"   Although patient's mood has stabilized, they are currently awaiting group home placement.  Their ability to recognize safety hazards take medications and participate in IADLs are significantly impaired by their cognitive deficits compounded by their chronic mental health needs and would be at risk for significant decompensation without the structure and support of a group home setting.      No associated orders from this encounter found during lookback period of 72 hours.  "

## 2025-01-19 NOTE — PLAN OF CARE
Problem: Ineffective Coping  Goal: Participates in unit activities  Description: Interventions:  - Provide therapeutic environment   - Provide required programming   - Redirect inappropriate behaviors   Outcome: Progressing  Note: Attends occasionally     Problem: Depression  Goal: Treatment Goal: Demonstrate behavioral control of depressive symptoms, verbalize feelings of improved mood/affect, and adopt new coping skills prior to discharge  Outcome: Progressing  Goal: Verbalize thoughts and feelings  Description: Interventions:  - Assess and re-assess patient's level of risk   - Engage patient in 1:1 interactions, daily, for a minimum of 15 minutes   - Encourage patient to express feelings, fears, frustrations, hopes   Outcome: Progressing  Goal: Refrain from harming self  Description: Interventions:  - Monitor patient closely, per order   - Supervise medication ingestion, monitor effects and side effects   Outcome: Progressing  Goal: Refrain from isolation  Description: Interventions:  - Develop a trusting relationship   - Encourage socialization   Outcome: Progressing     Problem: Anxiety  Goal: Anxiety is at manageable level  Description: Interventions:  - Assess and monitor patient's anxiety level.   - Monitor for signs and symptoms (heart palpitations, chest pain, shortness of breath, headaches, nausea, feeling jumpy, restlessness, irritable, apprehensive).   - Collaborate with interdisciplinary team and initiate plan and interventions as ordered.  - [place] patient to unit/surroundings  - Explain treatment plan  - Encourage participation in care  - Encourage verbalization of concerns/fears  - Identify coping mechanisms  - Assist in developing anxiety-reducing skills  - Administer/offer alternative therapies  - Limit or eliminate stimulants  Outcome: Progressing     Problem: DISCHARGE PLANNING - CARE MANAGEMENT  Goal: Discharge to post-acute care or home with appropriate resources  Description:  INTERVENTIONS:  - Conduct assessment to determine patient/family and health care team treatment goals, and need for post-acute services based on payer coverage, community resources, and patient preferences, and barriers to discharge  - Address psychosocial, clinical, and financial barriers to discharge as identified in assessment in conjunction with the patient/family and health care team  - Arrange appropriate level of post-acute services according to patient’s   needs and preference and payer coverage in collaboration with the physician and health care team  - Communicate with and update the patient/family, physician, and health care team regarding progress on the discharge plan  - Arrange appropriate transportation to post-acute venues  Outcome: Progressing     Problem: Knowledge Deficit  Goal: Patient/family/caregiver demonstrates understanding of disease process, treatment plan, medications, and discharge instructions  Description: Complete learning assessment and assess knowledge base.  Interventions:  - Provide teaching at level of understanding  - Provide teaching via preferred learning methods  Outcome: Progressing     Problem: SLEEP DISTURBANCE  Goal: Will exhibit normal sleeping pattern  Description: Interventions:  -  Assess the patients sleep pattern, noting recent changes  - Administer medication as ordered  - Decrease environmental stimuli, including noise, as appropriate during the night  - Encourage the patient to actively participate in unit groups and or exercise during the day to enhance ability to achieve adequate sleep at night  - Assess the patient, in the morning, encouraging a description of sleep experience  Outcome: Progressing

## 2025-01-19 NOTE — PROGRESS NOTES
"Progress Note - Behavioral Health   Name: Franco Roberson 39 y.o. male I MRN: [id number]  Unit/Bed#: -01 I Date of Admission: 5/14/2024   Date of Service: 1/19/2025 I Hospital Day: 250     Assessment & Plan  MDD (major depressive disorder), recurrent severe, without psychosis (HCC)  Patient continues to do well and the plan is to continue with the current treatment plan as mentioned below with no changes.    Patient continues to be pending group home placement. Otherwise no clinical significant change.   was able to contact Social Security office again and recent confirmation of patient's money being in [employer] account    Medication regimen as follows, no changes as of 1/19/2025:  Abilify 5 mg daily as mood adjunct   Zoloft 150 mg daily for depression and anxiety  Continue to encourage participation in group therapy, milieu therapy and occupational therapy.  Continue to assess for side effects of medications.  Continue collaboration with [facility] for medical co-morbidities as indicated.  Continue discussion with CM/SW to assist with obtaining collateral, disposition planning, and the implementation of patient-centered individualized plan of care.  Continue frequent safety checks and vitals per unit protocol.    Risks, benefits and possible side effects of Medications: Risks, benefits, and possible side effects of medications have previously been explained. No new medications at this time.      Legal status: 201    Disposition: to be determined, pending SOAR application for potential group home placement. OT Cognitive Evaluation completed: \"Pt would benefit from discharge to a supportive environment that can provide checks for safety and compliance with IADLs. \"   Although patient's mood has stabilized, they are currently awaiting group home placement.  Their ability to recognize safety hazards take medications and participate in IADLs are significantly impaired by their cognitive deficits compounded by their " "chronic mental health needs and would be at risk for significant decompensation without the structure and support of a group home setting.      No associated orders from this encounter found during lookback period of 72 hours.  Autism spectrum disorder  Continue supportive care    No associated orders from this encounter found during lookback period of 72 hours.        Subjective: All documentation including nursing notes, medication history to ensure medication adherence on the unit, labs, and vitals were reviewed. Franco was evaluated this morning for continuity of care and no acute distress noted throughout the evaluation. Over the past 24 hours per nursing report, Franco has been cooperative on the unit and compliant with medications. On evening shift, patient was noted to be overall withdrawn to room, did come out for snacks. No acute events overnight. No prns required.     Today, Franco is consenting for safety on the unit. Franco reports feeling \"good.\" Franco notes having good sleep. Franco states having an adequate appetite. Franco has been taking the medications as prescribed and reporting no side effects. When asked what he did to pass time yesterday, patient stated he read poetry. He states \"I also make up poems sometimes\". Patient is encouraged to continue his interests. States he did not attend groups this weekend.     Franco denies suicidal ideations. Franco denies homicidal ideations. Regarding hallucinations, Franco denies auditory and visual hallucinations. He denies other complaints today.     PRNs overnight: none   VS: Reviewed, within normal limits    Progress Toward Goals: slow improvement    Psychiatric Review of Systems:  Behavior over the last 24 hours:  unchanged  Sleep: normal  Appetite: normal  Medication side effects: No   ROS: no complaints    Vital signs in last 24 hours:  Temp:  [97 °F (36.1 °C)-97.4 °F (36.3 °C)] 97 °F (36.1 °C)  HR:  [73-77] 73  BP: (107-119)/(56-64) 119/56  Resp:  " "[17] 17  SpO2:  [97 %-98 %] 97 %  O2 Device: None (Room air)    Laboratory results:  I have personally reviewed all pertinent laboratory/tests results.  No results found for this or any previous visit (from the past 48 hours).      Mental Status Evaluation:    Appearance:  casually dressed, looks older than stated age, overtly  appearing male    Behavior:  cooperative, calm, guarded   Speech:  normal rate, decreased volume   Mood:  \"good\"   Affect:  constricted   Thought Process:  linear, decreased rate of thoughts   Associations: concrete associations   Thought Content:  no overt delusions   Perceptual Disturbances: Denies auditory or visual hallucinations and Does not appear to be responding to internal stimuli   Risk Potential: Suicidal ideation - None at present, contracts for safety on the unit, would talk to staff if not feeling safe on the unit  Homicidal ideation - None at present  Potential for aggression - Not at present   Sensorium:  oriented to person, place, and time/date   Memory:  recent and remote memory grossly intact   Consciousness:  alert and awake   Attention/Concentration: attention span and concentration appear shorter than expected for age   Insight:  limited   Judgment: limited   Gait/Station: normal gait/station   Motor Activity: no abnormal movements       Current Medications:  Current Facility-Administered Medications   Medication Dose Route Frequency Provider Last Rate    acetaminophen  650 mg Oral Q6H PRN Basilio Brown MD      acetaminophen  650 mg Oral Q4H PRN Basilio Brown MD      acetaminophen  975 mg Oral Q6H PRN Basilio Brown MD      aluminum-magnesium hydroxide-simethicone  30 mL Oral Q4H PRN Basilio Brown MD      ARIPiprazole  5 mg Oral Daily Basilio Panda MD      Artificial Tears  1 drop Both Eyes Q3H PRN Basilio Brown MD      atorvastatin  10 mg Oral Daily With Dinner WALLY Baptiste      benztropine  1 mg " Intramuscular Q4H PRN Max 6/day Basilio Brown MD      benztropine  1 mg Oral Q4H PRN Max 6/day Basilio Brown MD      Cholecalciferol  2,500 Units Oral Daily Dustin Mcallister MD      cyanocobalamin  1,000 mcg Oral Daily Laura WALLY Olmos      Diclofenac Sodium  2 g Topical 4x Daily PRN WALLY Baptiste      hydrOXYzine HCL  50 mg Oral Q6H PRN Max 4/day Basilio Brown MD      Or    diphenhydrAMINE  50 mg Intramuscular Q6H PRN Basilio Brown MD      diphenhydrAMINE-zinc acetate   Topical Daily PRN Raj Guerrero MD      famotidine  20 mg Oral BID WALLY Baptiste      hydrOXYzine HCL  100 mg Oral Q6H PRN Max 4/day Basilio Brown MD      Or    LORazepam  2 mg Intramuscular Q6H PRN Basilio Brown MD      hydrOXYzine HCL  25 mg Oral Q6H PRN Max 4/day Basilio Brown MD      levothyroxine  37.5 mcg Oral Early Morning WALLY Baptiste      melatonin  6 mg Oral HS PRN WALLY Gomez      methocarbamol  500 mg Oral Q6H PRN WALLY Baptiste      OLANZapine  5 mg Oral Q4H PRN Max 3/day Basilio Brown MD      Or    OLANZapine  2.5 mg Intramuscular Q4H PRN Max 3/day Basilio Brown MD      OLANZapine  5 mg Oral Q3H PRN Max 3/day Basilio Brown MD      Or    OLANZapine  5 mg Intramuscular Q3H PRN Max 3/day Basilio Brown MD      OLANZapine  2.5 mg Oral Q4H PRN Max 6/day Basilio Brown MD      polyethylene glycol  17 g Oral Daily PRN Basilio Brown MD      propranolol  10 mg Oral Q8H PRN Basilio Brown MD      senna-docusate sodium  2 tablet Oral After Dinner Dustin Mcallister MD      sertraline  150 mg Oral Daily WALLY Gomez      traZODone  50 mg Oral HS PRN Basilio Brown MD         Behavioral Health Medications: All current active meds have been reviewed. Changes as in plan section above.  Risks, benefits and possible side effects of Medications:   Risks, benefits, and  possible side effects of medications explained to patient and patient verbalizes understanding.      Counseling / Coordination of Care:  Patient's progress discussed with staff in treatment team meeting.  Medications, treatment progress and treatment plan reviewed with patient.    Rosangela Robertson DO  Psychiatry Resident, PGY-2    This note was completed in part utilizing Dragon dictation Software. Grammatical, translation, syntax errors, random word insertions, spelling mistakes, and incomplete sentences may be an occasional consequence of this system secondary to software limitations with voice recognition, ambient noise, and hardware issues. If you have any questions or concerns about the content, text, or information contained within the body of this dictation, please contact the provider for clarification.

## 2025-01-20 PROCEDURE — 99232 SBSQ HOSP IP/OBS MODERATE 35: CPT | Performed by: PSYCHIATRY & NEUROLOGY

## 2025-01-20 RX ADMIN — SERTRALINE HYDROCHLORIDE 150 MG: 100 TABLET ORAL at 08:24

## 2025-01-20 RX ADMIN — LEVOTHYROXINE SODIUM 37.5 MCG: 125 TABLET ORAL at 06:13

## 2025-01-20 RX ADMIN — FAMOTIDINE 20 MG: 20 TABLET ORAL at 17:20

## 2025-01-20 RX ADMIN — Medication 2500 UNITS: at 08:25

## 2025-01-20 RX ADMIN — ARIPIPRAZOLE 5 MG: 5 TABLET ORAL at 08:24

## 2025-01-20 RX ADMIN — ATORVASTATIN CALCIUM 10 MG: 10 TABLET, FILM COATED ORAL at 17:20

## 2025-01-20 RX ADMIN — FAMOTIDINE 20 MG: 20 TABLET ORAL at 08:24

## 2025-01-20 RX ADMIN — CYANOCOBALAMIN TAB 1000 MCG 1000 MCG: 1000 TAB at 08:24

## 2025-01-20 RX ADMIN — SENNOSIDES AND DOCUSATE SODIUM 2 TABLET: 50; 8.6 TABLET ORAL at 17:20

## 2025-01-20 NOTE — PROGRESS NOTES
01/20/25 0838   Team Meeting   Meeting Type Daily Rounds   Team Members Present   Team Members Present Physician;Nurse;   Physician Team Member Ary   [facility]ursing Team Member [facility]   Care Management Team Member Noa   Patient/Family Present   Patient Present No   Patient's Family Present No     Pt denies SI/HI/AVH. Pt is medication and meal compliant. Pt is calm and compliant. Pt's discharge is pending placement and SSI approval.

## 2025-01-20 NOTE — PROGRESS NOTES
"Progress Note - Behavioral Health   Name: Franco Roberson 39 y.o. male I MRN: [id number]  Unit/Bed#: -01 I Date of Admission: 5/14/2024   Date of Service: 1/20/2025 I Hospital Day: 251     Assessment & Plan  MDD (major depressive disorder), recurrent severe, without psychosis (HCC)  Patient continues to do well and the plan is to continue with the current treatment plan as mentioned below with no changes.    Patient continues to be pending group home placement. Otherwise no clinical significant change.   was able to contact Social Security office again and recent confirmation of patient's money being in [employer] account    Medication regimen as follows, no changes as of 1/20/2025:  Abilify 5 mg daily as mood adjunct   Zoloft 150 mg daily for depression and anxiety  Continue to encourage participation in group therapy, milieu therapy and occupational therapy.  Continue to assess for side effects of medications.  Continue collaboration with [facility] for medical co-morbidities as indicated.  Continue discussion with CM/SW to assist with obtaining collateral, disposition planning, and the implementation of patient-centered individualized plan of care.  Continue frequent safety checks and vitals per unit protocol.    Risks, benefits and possible side effects of Medications: Risks, benefits, and possible side effects of medications have previously been explained. No new medications at this time.      Legal status: 201    Disposition: to be determined, pending SOAR application for potential group home placement. OT Cognitive Evaluation completed: \"Pt would benefit from discharge to a supportive environment that can provide checks for safety and compliance with IADLs. \"   Although patient's mood has stabilized, they are currently awaiting group home placement.  Their ability to recognize safety hazards take medications and participate in IADLs are significantly impaired by their cognitive deficits compounded by their " chronic mental health needs and would be at risk for significant decompensation without the structure and support of a group home setting.      No associated orders from this encounter found during lookback period of 72 hours.  Autism spectrum disorder  Continue supportive care    No associated orders from this encounter found during lookback period of 72 hours.        Plan     Recommended Treatment:    - Encourage early mobility and having a structured day  - Provide frequent re-orientation, and cognitive stimulation  - Ensure assistive devices are in proper working order (eye-glasses, hearing aids)  - Encourage adequate hydration, nutrition and monitor bowel movements  - Maintain sleep-wake cycle: Uninterrupted sleep time; low-level lighting at night  - Fall precaution  - f/u [facility] recs regarding the medical problems   - Continue medication titration and treatment plan; adjust medication to optimize treatment response and as clinically indicated. .   - Observation: routine            - VS: as per unit protocol  - Diet: Regular diet  - Encourage group attendance and milieu therapy  - Dispo: To be determined     Scheduled medications:  Current Facility-Administered Medications   Medication Dose Route Frequency Provider Last Rate    acetaminophen  650 mg Oral Q6H PRN Basilio Brown MD      acetaminophen  650 mg Oral Q4H PRN Basilio Brown MD      acetaminophen  975 mg Oral Q6H PRN Basilio Brown MD      aluminum-magnesium hydroxide-simethicone  30 mL Oral Q4H PRN Basilio Brown MD      ARIPiprazole  5 mg Oral Daily Basilio Panda MD      Artificial Tears  1 drop Both Eyes Q3H PRN Basilio Brown MD      atorvastatin  10 mg Oral Daily With Dinner WALLY Baptiste      benztropine  1 mg Intramuscular Q4H PRN Max 6/day Basilio Brown MD      benztropine  1 mg Oral Q4H PRN Max 6/day Basilio Brown MD      Cholecalciferol  2,500 Units Oral Daily Dustin Mcallister MD       cyanocobalamin  1,000 mcg Oral Daily WALLY Baptiste      Diclofenac Sodium  2 g Topical 4x Daily PRN WALLY Baptiste      hydrOXYzine HCL  50 mg Oral Q6H PRN Max 4/day Basilio Brown MD      Or    diphenhydrAMINE  50 mg Intramuscular Q6H PRN Basilio Brown MD      diphenhydrAMINE-zinc acetate   Topical Daily PRN Raj Guerrero MD      famotidine  20 mg Oral BID LauraWALLY Tony      hydrOXYzine HCL  100 mg Oral Q6H PRN Max 4/day Basliio Brown MD      Or    LORazepam  2 mg Intramuscular Q6H PRN Basilio Brown MD      hydrOXYzine HCL  25 mg Oral Q6H PRN Max 4/day Basilio Brown MD      levothyroxine  37.5 mcg Oral Early Morning WALLY Baptiste      melatonin  6 mg Oral HS PRN WALLY Gomez      methocarbamol  500 mg Oral Q6H PRN WALLY Baptiste      OLANZapine  5 mg Oral Q4H PRN Max 3/day Basilio Brown MD      Or    OLANZapine  2.5 mg Intramuscular Q4H PRN Max 3/day Basilio Brown MD      OLANZapine  5 mg Oral Q3H PRN Max 3/day Basilio Brown MD      Or    OLANZapine  5 mg Intramuscular Q3H PRN Max 3/day Basilio Brown MD      OLANZapine  2.5 mg Oral Q4H PRN Max 6/day Basilio Brown MD      polyethylene glycol  17 g Oral Daily PRN Basilio Brown MD      propranolol  10 mg Oral Q8H PRN Basilio Brown MD      senna-docusate sodium  2 tablet Oral After Dinner Dustin Mcallister MD      sertraline  150 mg Oral Daily WALLY Gomez      traZODone  50 mg Oral HS PRN Basilio Brown MD        PRN:    acetaminophen    acetaminophen    acetaminophen    aluminum-magnesium hydroxide-simethicone    Artificial Tears    benztropine    benztropine    Diclofenac Sodium    hydrOXYzine HCL **OR** diphenhydrAMINE    diphenhydrAMINE-zinc acetate    hydrOXYzine HCL **OR** LORazepam    hydrOXYzine HCL    melatonin    methocarbamol    OLANZapine **OR** OLANZapine    OLANZapine **OR**  "OLANZapine    OLANZapine    polyethylene glycol    propranolol    traZODone       Subjective     Patient was visited on unit for continuing care; chart reviewed and discussed with multidisciplinary treatment team.  On approach, the patient was calm and cooperative. Denied any changes in mood, appetite, and energy level. No problem initiating and maintaining sleep.  Denied A/VH currently.  Denied SI/HI, intent or plan upon direct inquiry at this time.    Patient continues to be visible in the milieu and interacts with staff and peers. No reports of aggression or self-injurious behavior on unit. No PRN medications used in the past 24 hours.    Patient accepted all offered medications and no adverse effects of medications noted or reported.    Objective    Current Mental Status Evaluation:  Mental Status Exam  Appearance: casually dressed, consistent with stated age  Motor: no psychomotor retardation, no gait abnormalities  Behavior: cooperative, answers questions appropriately  Speech: soft, normal rhythm  Mood: \"good\"  Affect: constricted  Thought Process: linear and goal-oriented  Thought Content: denies delusions or paranoia  Risk Potential: denies suicidal ideation, plan, or intent. Denies homicidal ideation  Perceptions: denies auditory hallucinations, denies visual hallucinations,   Sensorium: Oriented to person, place, time, and situation  Cognition: cognitive ability appears intact but was not quantitatively tested  Consciousness: alert and awake  Attention: intact, able to focus without difficulty  Insight: limited  Judgement: limited            Vital signs in last 24 hours:    Temp:  [97.3 °F (36.3 °C)-97.5 °F (36.4 °C)] 97.3 °F (36.3 °C)  HR:  [83-85] 85  BP: (107-125)/(62-68) 107/62  Resp:  [16] 16  SpO2:  [96 %-97 %] 97 %  O2 Device: None (Room air)    Psychiatric Review of Systems:  Medication adverse effects: none  Sleep: unchanged  Appetite: unchanged  Behaviors over the past 24 hours: " unchanged    Laboratory results:    I have personally reviewed all pertinent laboratory/tests results  No results found for this or any previous visit (from the past 48 hours).       Progress Toward Goals & Illness Status:   progressing, participates in milieu therapy, mood is stabilizing, working on coping skills, discharge planning    Patient is not at goal. They are not yet ready for discharge. The patient's condition currently requires active psychopharmacological medication management, interdisciplinary coordination with case management, and the utilization of adjunctive milieu and group therapy to augment psychopharmacological efficacy. The patient's risk of morbidity, and progression or decompensation of psychiatric disease, is higher without this current treatment.     Next of Kin  Extended Emergency Contact Information  Primary Emergency Contact: Lois Roberson  Address: [address] [place] [address]  Home Phone: [phone]  Relation: Mother    Counseling / Coordination of Care  Patient's progress discussed with staff in treatment team meeting.  Medications, treatment progress and treatment plan reviewed with patient.  Medication education provided to patient.  Educated on importance of medication and treatment compliance.  Supportive therapy provided to patient.  Cognitive techniques utilized during the session.  Reassurance and supportive therapy provided.  Reoriented to reality and reassured.  Encouraged participation in milieu and group therapy on the unit.  Crisis/safety plan discussed with patient.       Dustin Mcallister MD  Attending Psychiatrist   [facility]      This note has been constructed using a voice recognition system. There may be translation, syntax, or grammatical errors. If you have any questions, please contact the dictating provider.

## 2025-01-20 NOTE — ASSESSMENT & PLAN NOTE
"Patient continues to do well and the plan is to continue with the current treatment plan as mentioned below with no changes.    Patient continues to be pending group home placement. Otherwise no clinical significant change.   was able to contact Social Security office again and recent confirmation of patient's money being in [employer] account    Medication regimen as follows, no changes as of 1/20/2025:  Abilify 5 mg daily as mood adjunct   Zoloft 150 mg daily for depression and anxiety  Continue to encourage participation in group therapy, milieu therapy and occupational therapy.  Continue to assess for side effects of medications.  Continue collaboration with PATR[facility] for medical co-morbidities as indicated.  Continue discussion with CM/SW to assist with obtaining collateral, disposition planning, and the implementation of patient-centered individualized plan of care.  Continue frequent safety checks and vitals per unit protocol.    Risks, benefits and possible side effects of Medications: Risks, benefits, and possible side effects of medications have previously been explained. No new medications at this time.      Legal status: 201    Disposition: to be determined, pending SOAR application for potential group home placement. OT Cognitive Evaluation completed: \"Pt would benefit from discharge to a supportive environment that can provide checks for safety and compliance with IADLs. \"   Although patient's mood has stabilized, they are currently awaiting group home placement.  Their ability to recognize safety hazards take medications and participate in IADLs are significantly impaired by their cognitive deficits compounded by their chronic mental health needs and would be at risk for significant decompensation without the structure and support of a group home setting.      No associated orders from this encounter found during lookback period of 72 hours.  "

## 2025-01-20 NOTE — NURSING NOTE
Denies all symptoms. Pleasant, calm and cooperative. Occasionally about the unit doing laps alone. Waiting placement.

## 2025-01-20 NOTE — PLAN OF CARE
Problem: Ineffective Coping  Goal: Participates in unit activities  Description: Interventions:  - Provide therapeutic environment   - Provide required programming   - Redirect inappropriate behaviors   Outcome: Progressing     Problem: Depression  Goal: Treatment Goal: Demonstrate behavioral control of depressive symptoms, verbalize feelings of improved mood/affect, and adopt new coping skills prior to discharge  Outcome: Progressing  Goal: Verbalize thoughts and feelings  Description: Interventions:  - Assess and re-assess patient's level of risk   - Engage patient in 1:1 interactions, daily, for a minimum of 15 minutes   - Encourage patient to express feelings, fears, frustrations, hopes   Outcome: Progressing  Goal: Refrain from harming self  Description: Interventions:  - Monitor patient closely, per order   - Supervise medication ingestion, monitor effects and side effects   Outcome: Progressing  Goal: Refrain from isolation  Description: Interventions:  - Develop a trusting relationship   - Encourage socialization   Outcome: Progressing     Problem: Anxiety  Goal: Anxiety is at manageable level  Description: Interventions:  - Assess and monitor patient's anxiety level.   - Monitor for signs and symptoms (heart palpitations, chest pain, shortness of breath, headaches, nausea, feeling jumpy, restlessness, irritable, apprehensive).   - Collaborate with interdisciplinary team and initiate plan and interventions as ordered.  - [place] patient to unit/surroundings  - Explain treatment plan  - Encourage participation in care  - Encourage verbalization of concerns/fears  - Identify coping mechanisms  - Assist in developing anxiety-reducing skills  - Administer/offer alternative therapies  - Limit or eliminate stimulants  Outcome: Progressing     Problem: DISCHARGE PLANNING - CARE MANAGEMENT  Goal: Discharge to post-acute care or home with appropriate resources  Description: INTERVENTIONS:  - Conduct assessment to  determine patient/family and health care team treatment goals, and need for post-acute services based on payer coverage, community resources, and patient preferences, and barriers to discharge  - Address psychosocial, clinical, and financial barriers to discharge as identified in assessment in conjunction with the patient/family and health care team  - Arrange appropriate level of post-acute services according to patient’s   needs and preference and payer coverage in collaboration with the physician and health care team  - Communicate with and update the patient/family, physician, and health care team regarding progress on the discharge plan  - Arrange appropriate transportation to post-acute venues  Outcome: Progressing     Problem: Knowledge Deficit  Goal: Patient/family/caregiver demonstrates understanding of disease process, treatment plan, medications, and discharge instructions  Description: Complete learning assessment and assess knowledge base.  Interventions:  - Provide teaching at level of understanding  - Provide teaching via preferred learning methods  Outcome: Progressing     Problem: SLEEP DISTURBANCE  Goal: Will exhibit normal sleeping pattern  Description: Interventions:  -  Assess the patients sleep pattern, noting recent changes  - Administer medication as ordered  - Decrease environmental stimuli, including noise, as appropriate during the night  - Encourage the patient to actively participate in unit groups and or exercise during the day to enhance ability to achieve adequate sleep at night  - Assess the patient, in the morning, encouraging a description of sleep experience  Outcome: Progressing

## 2025-01-20 NOTE — PLAN OF CARE
Pt has been attending less groups and just sporadically attending groups. Pt appropriate when in attendance.

## 2025-01-21 PROCEDURE — 99232 SBSQ HOSP IP/OBS MODERATE 35: CPT | Performed by: PSYCHIATRY & NEUROLOGY

## 2025-01-21 RX ADMIN — Medication 2500 UNITS: at 08:20

## 2025-01-21 RX ADMIN — LEVOTHYROXINE SODIUM 37.5 MCG: 125 TABLET ORAL at 06:14

## 2025-01-21 RX ADMIN — CYANOCOBALAMIN TAB 1000 MCG 1000 MCG: 1000 TAB at 08:20

## 2025-01-21 RX ADMIN — ARIPIPRAZOLE 5 MG: 5 TABLET ORAL at 08:20

## 2025-01-21 RX ADMIN — FAMOTIDINE 20 MG: 20 TABLET ORAL at 08:20

## 2025-01-21 RX ADMIN — FAMOTIDINE 20 MG: 20 TABLET ORAL at 17:44

## 2025-01-21 RX ADMIN — SENNOSIDES AND DOCUSATE SODIUM 2 TABLET: 50; 8.6 TABLET ORAL at 17:44

## 2025-01-21 RX ADMIN — SERTRALINE HYDROCHLORIDE 150 MG: 100 TABLET ORAL at 08:20

## 2025-01-21 RX ADMIN — ATORVASTATIN CALCIUM 10 MG: 10 TABLET, FILM COATED ORAL at 17:44

## 2025-01-21 NOTE — PROGRESS NOTES
"Progress Note - Behavioral Health   Name: Franco Roberson 39 y.o. male I MRN: [id number]  Unit/Bed#: -01 I Date of Admission: 5/14/2024   Date of Service: 1/21/2025 I Hospital Day: 252     Assessment & Plan  MDD (major depressive disorder), recurrent severe, without psychosis (HCC)  Patient continues to do well and the plan is to continue with the current treatment plan as mentioned below with no changes.    Patient continues to be pending group home placement. Otherwise no clinical significant change.   was able to contact Social Security office again and recent confirmation of patient's money being in [employer] account    Medication regimen as follows, no changes as of 1/21/2025:  Abilify 5 mg daily as mood adjunct   Zoloft 150 mg daily for depression and anxiety  Continue to encourage participation in group therapy, milieu therapy and occupational therapy.  Continue to assess for side effects of medications.  Continue collaboration with [facility] for medical co-morbidities as indicated.  Continue discussion with CM/SW to assist with obtaining collateral, disposition planning, and the implementation of patient-centered individualized plan of care.  Continue frequent safety checks and vitals per unit protocol.    Risks, benefits and possible side effects of Medications: Risks, benefits, and possible side effects of medications have previously been explained. No new medications at this time.      Legal status: 201    Disposition: to be determined, pending SOAR application for potential group home placement. OT Cognitive Evaluation completed: \"Pt would benefit from discharge to a supportive environment that can provide checks for safety and compliance with IADLs. \"   Although patient's mood has stabilized, they are currently awaiting group home placement.  Their ability to recognize safety hazards take medications and participate in IADLs are significantly impaired by their cognitive deficits compounded by their " chronic mental health needs and would be at risk for significant decompensation without the structure and support of a group home setting.      No associated orders from this encounter found during lookback period of 72 hours.  Autism spectrum disorder  Continue supportive care    No associated orders from this encounter found during lookback period of 72 hours.        Plan     Recommended Treatment:    - Encourage early mobility and having a structured day  - Provide frequent re-orientation, and cognitive stimulation  - Ensure assistive devices are in proper working order (eye-glasses, hearing aids)  - Encourage adequate hydration, nutrition and monitor bowel movements  - Maintain sleep-wake cycle: Uninterrupted sleep time; low-level lighting at night  - Fall precaution  - f/u [facility] recs regarding the medical problems   - Continue medication titration and treatment plan; adjust medication to optimize treatment response and as clinically indicated. .   - Observation: routine            - VS: as per unit protocol  - Diet: Regular diet  - Encourage group attendance and milieu therapy  - Dispo: To be determined     Scheduled medications:  Current Facility-Administered Medications   Medication Dose Route Frequency Provider Last Rate    acetaminophen  650 mg Oral Q6H PRN Basilio Brown MD      acetaminophen  650 mg Oral Q4H PRN Basilio Brown MD      acetaminophen  975 mg Oral Q6H PRN Basilio Brown MD      aluminum-magnesium hydroxide-simethicone  30 mL Oral Q4H PRN Basilio Brown MD      ARIPiprazole  5 mg Oral Daily Basilio Panda MD      Artificial Tears  1 drop Both Eyes Q3H PRN Basilio Brown MD      atorvastatin  10 mg Oral Daily With Dinner WALLY Baptiste      benztropine  1 mg Intramuscular Q4H PRN Max 6/day Basilio Brown MD      benztropine  1 mg Oral Q4H PRN Max 6/day Basilio Brown MD      Cholecalciferol  2,500 Units Oral Daily Dustin Mcallister MD       cyanocobalamin  1,000 mcg Oral Daily WALLY Baptiste      Diclofenac Sodium  2 g Topical 4x Daily PRN WALLY Baptiste      hydrOXYzine HCL  50 mg Oral Q6H PRN Max 4/day Basilio Brown MD      Or    diphenhydrAMINE  50 mg Intramuscular Q6H PRN Basilio Brown MD      diphenhydrAMINE-zinc acetate   Topical Daily PRN Raj Guerrero MD      famotidine  20 mg Oral BID LauraWALLY Tony      hydrOXYzine HCL  100 mg Oral Q6H PRN Max 4/day Basilio Brown MD      Or    LORazepam  2 mg Intramuscular Q6H PRN Basilio Brown MD      hydrOXYzine HCL  25 mg Oral Q6H PRN Max 4/day Basilio Brown MD      levothyroxine  37.5 mcg Oral Early Morning WALLY Baptiste      melatonin  6 mg Oral HS PRN WALLY Gomez      methocarbamol  500 mg Oral Q6H PRN WALLY Baptiste      OLANZapine  5 mg Oral Q4H PRN Max 3/day Basilio Brown MD      Or    OLANZapine  2.5 mg Intramuscular Q4H PRN Max 3/day Basilio Brown MD      OLANZapine  5 mg Oral Q3H PRN Max 3/day Basilio Brown MD      Or    OLANZapine  5 mg Intramuscular Q3H PRN Max 3/day Basilio Brown MD      OLANZapine  2.5 mg Oral Q4H PRN Max 6/day Basilio Brown MD      polyethylene glycol  17 g Oral Daily PRN Basilio Brown MD      propranolol  10 mg Oral Q8H PRN Basilio Brown MD      senna-docusate sodium  2 tablet Oral After Dinner Dustin Mcallister MD      sertraline  150 mg Oral Daily WALLY Gomez      traZODone  50 mg Oral HS PRN Basilio Brwon MD        PRN:    acetaminophen    acetaminophen    acetaminophen    aluminum-magnesium hydroxide-simethicone    Artificial Tears    benztropine    benztropine    Diclofenac Sodium    hydrOXYzine HCL **OR** diphenhydrAMINE    diphenhydrAMINE-zinc acetate    hydrOXYzine HCL **OR** LORazepam    hydrOXYzine HCL    melatonin    methocarbamol    OLANZapine **OR** OLANZapine    OLANZapine **OR**  "OLANZapine    OLANZapine    polyethylene glycol    propranolol    traZODone       Subjective     Patient was visited on unit for continuing care; chart reviewed and discussed with multidisciplinary treatment team.  On approach, the patient was calm and cooperative. Denied any changes in mood, appetite, and energy level. Was able to have f/u call w/ social security this AM w/ SW to provide them w/ additional information. Outcome of call pending. No problem initiating and maintaining sleep.  Denied A/VH currently.  Denied SI/HI, intent or plan upon direct inquiry at this time.    Patient continues to be visible in the milieu and interacts with staff and peers. No reports of aggression or self-injurious behavior on unit. No PRN medications used in the past 24 hours.    Patient accepted all offered medications and no adverse effects of medications noted or reported.    Objective    Current Mental Status Evaluation:  Mental Status Exam  Appearance: casually dressed, consistent with stated age  Motor: no psychomotor retardation, no gait abnormalities  Behavior: cooperative, answers questions appropriately  Speech: soft, normal rhythm  Mood: \"good\"  Affect:  blunted, congruent  Thought Process: linear and goal-oriented  Thought Content: denies delusions  Risk Potential: denies suicidal ideation, plan, or intent. Denies homicidal ideation  Perceptions: denies auditory hallucinations, denies visual hallucinations,   Sensorium: Oriented to person, place, time, and situation  Cognition: cognitive ability appears intact but was not quantitatively tested  Consciousness: alert and awake  Attention: intact, able to focus without difficulty  Insight: limited  Judgement: limited          Vital signs in last 24 hours:    Temp:  [97 °F (36.1 °C)-97.2 °F (36.2 °C)] 97 °F (36.1 °C)  HR:  [74-81] 74  BP: (101-115)/(67-69) 101/69  Resp:  [16] 16  SpO2:  [97 %-98 %] 98 %  O2 Device: None (Room air)    Psychiatric Review of " Systems:  Medication adverse effects: none  Sleep: unchanged  Appetite: unchanged  Behaviors over the past 24 hours: unchanged    Laboratory results:    I have personally reviewed all pertinent laboratory/tests results  No results found for this or any previous visit (from the past 48 hours).       Progress Toward Goals & Illness Status:   progressing, mood is stabilizing, discharge planning, placement pending    Patient is not at goal. They are not yet ready for discharge. The patient's condition currently requires active psychopharmacological medication management, interdisciplinary coordination with case management, and the utilization of adjunctive milieu and group therapy to augment psychopharmacological efficacy. The patient's risk of morbidity, and progression or decompensation of psychiatric disease, is higher without this current treatment.     Next of Kin  Extended Emergency Contact Information  Primary Emergency Contact: Lois Roberson  Address: [address] [place] [address]  Home Phone: [phone]  Relation: Mother    Counseling / Coordination of Care  Patient's progress discussed with staff in treatment team meeting.  Medications, treatment progress and treatment plan reviewed with patient.  Medication education provided to patient.  Educated on importance of medication and treatment compliance.  Supportive therapy provided to patient.  Cognitive techniques utilized during the session.  Reassurance and supportive therapy provided.  Reoriented to reality and reassured.  Encouraged participation in milieu and group therapy on the unit.  Crisis/safety plan discussed with patient.       Dustin Mcallister MD  Attending Psychiatrist   [facility]      This note has been constructed using a voice recognition system. There may be translation, syntax, or grammatical errors. If you have any questions, please contact the dictating  provider.

## 2025-01-21 NOTE — NURSING NOTE
Pt calm and cooperative with care. Pt took all scheduled medications and had good intake at meals time . Pt offers no concerns at this time. Will maintain q 15 mins checks.

## 2025-01-21 NOTE — PLAN OF CARE
Problem: Ineffective Coping  Goal: Participates in unit activities  Description: Interventions:  - Provide therapeutic environment   - Provide required programming   - Redirect inappropriate behaviors   Outcome: Progressing     Problem: Depression  Goal: Treatment Goal: Demonstrate behavioral control of depressive symptoms, verbalize feelings of improved mood/affect, and adopt new coping skills prior to discharge  Outcome: Progressing  Goal: Verbalize thoughts and feelings  Description: Interventions:  - Assess and re-assess patient's level of risk   - Engage patient in 1:1 interactions, daily, for a minimum of 15 minutes   - Encourage patient to express feelings, fears, frustrations, hopes   Outcome: Progressing  Goal: Refrain from harming self  Description: Interventions:  - Monitor patient closely, per order   - Supervise medication ingestion, monitor effects and side effects   Outcome: Progressing  Goal: Refrain from isolation  Description: Interventions:  - Develop a trusting relationship   - Encourage socialization   Outcome: Progressing     Problem: Anxiety  Goal: Anxiety is at manageable level  Description: Interventions:  - Assess and monitor patient's anxiety level.   - Monitor for signs and symptoms (heart palpitations, chest pain, shortness of breath, headaches, nausea, feeling jumpy, restlessness, irritable, apprehensive).   - Collaborate with interdisciplinary team and initiate plan and interventions as ordered.  - [place] patient to unit/surroundings  - Explain treatment plan  - Encourage participation in care  - Encourage verbalization of concerns/fears  - Identify coping mechanisms  - Assist in developing anxiety-reducing skills  - Administer/offer alternative therapies  - Limit or eliminate stimulants  Outcome: Progressing     Problem: Knowledge Deficit  Goal: Patient/family/caregiver demonstrates understanding of disease process, treatment plan, medications, and discharge  instructions  Description: Complete learning assessment and assess knowledge base.  Interventions:  - Provide teaching at level of understanding  - Provide teaching via preferred learning methods  Outcome: Progressing     Problem: SLEEP DISTURBANCE  Goal: Will exhibit normal sleeping pattern  Description: Interventions:  -  Assess the patients sleep pattern, noting recent changes  - Administer medication as ordered  - Decrease environmental stimuli, including noise, as appropriate during the night  - Encourage the patient to actively participate in unit groups and or exercise during the day to enhance ability to achieve adequate sleep at night  - Assess the patient, in the morning, encouraging a description of sleep experience  Outcome: Progressing

## 2025-01-21 NOTE — NURSING NOTE
Patient visible on the unit,appears to be withdrawn to self. Patient denies depression ,SI/HI/AH/VH at this time. Compliant with medications and routine care.

## 2025-01-21 NOTE — SOCIAL WORK
Cm called [facility] [phone] ext. [phone]  Cm left voicemail with call back information.    Pt spoke with [facility].

## 2025-01-21 NOTE — PROGRESS NOTES
01/21/25 0834   Team Meeting   Meeting Type Daily Rounds   Team Members Present   Team Members Present Physician;Nurse;   Physician Team Member Ary   [facility]ing Team [facility] Team Member Noa   Patient/Family Present   Patient Present No   Patient's Family Present No     Pt denies SI/HI/AVH. Pt is medication and meal compliant. Pt is calm and cooperative. Pt's discharge is pending placement.

## 2025-01-21 NOTE — ASSESSMENT & PLAN NOTE
"Patient continues to do well and the plan is to continue with the current treatment plan as mentioned below with no changes.    Patient continues to be pending group home placement. Otherwise no clinical significant change.   was able to contact Social Security office again and recent confirmation of patient's money being in [employer] account    Medication regimen as follows, no changes as of 1/21/2025:  Abilify 5 mg daily as mood adjunct   Zoloft 150 mg daily for depression and anxiety  Continue to encourage participation in group therapy, milieu therapy and occupational therapy.  Continue to assess for side effects of medications.  Continue collaboration with PATRICIA for medical co-morbidities as indicated.  Continue discussion with CM/SW to assist with obtaining collateral, disposition planning, and the implementation of patient-centered individualized plan of care.  Continue frequent safety checks and vitals per unit protocol.    Risks, benefits and possible side effects of Medications: Risks, benefits, and possible side effects of medications have previously been explained. No new medications at this time.      Legal status: 201    Disposition: to be determined, pending SOAR application for potential group home placement. OT Cognitive Evaluation completed: \"Pt would benefit from discharge to a supportive environment that can provide checks for safety and compliance with IADLs. \"   Although patient's mood has stabilized, they are currently awaiting group home placement.  Their ability to recognize safety hazards take medications and participate in IADLs are significantly impaired by their cognitive deficits compounded by their chronic mental health needs and would be at risk for significant decompensation without the structure and support of a group home setting.      No associated orders from this encounter found during lookback period of 72 hours.  "

## 2025-01-22 PROCEDURE — 99232 SBSQ HOSP IP/OBS MODERATE 35: CPT | Performed by: PSYCHIATRY & NEUROLOGY

## 2025-01-22 RX ADMIN — ATORVASTATIN CALCIUM 10 MG: 10 TABLET, FILM COATED ORAL at 17:36

## 2025-01-22 RX ADMIN — SERTRALINE HYDROCHLORIDE 150 MG: 100 TABLET ORAL at 08:06

## 2025-01-22 RX ADMIN — FAMOTIDINE 20 MG: 20 TABLET ORAL at 08:06

## 2025-01-22 RX ADMIN — ARIPIPRAZOLE 5 MG: 5 TABLET ORAL at 08:06

## 2025-01-22 RX ADMIN — Medication 2500 UNITS: at 08:06

## 2025-01-22 RX ADMIN — CYANOCOBALAMIN TAB 1000 MCG 1000 MCG: 1000 TAB at 08:06

## 2025-01-22 RX ADMIN — SENNOSIDES AND DOCUSATE SODIUM 2 TABLET: 50; 8.6 TABLET ORAL at 17:36

## 2025-01-22 RX ADMIN — FAMOTIDINE 20 MG: 20 TABLET ORAL at 17:36

## 2025-01-22 NOTE — PLAN OF CARE
Problem: Ineffective Coping  Goal: Participates in unit activities  Description: Interventions:  - Provide therapeutic environment   - Provide required programming   - Redirect inappropriate behaviors   Outcome: Progressing     Problem: Depression  Goal: Verbalize thoughts and feelings  Description: Interventions:  - Assess and re-assess patient's level of risk   - Engage patient in 1:1 interactions, daily, for a minimum of 15 minutes   - Encourage patient to express feelings, fears, frustrations, hopes   Outcome: Progressing     Problem: Anxiety  Goal: Anxiety is at manageable level  Description: Interventions:  - Assess and monitor patient's anxiety level.   - Monitor for signs and symptoms (heart palpitations, chest pain, shortness of breath, headaches, nausea, feeling jumpy, restlessness, irritable, apprehensive).   - Collaborate with interdisciplinary team and initiate plan and interventions as ordered.  - [place] patient to unit/surroundings  - Explain treatment plan  - Encourage participation in care  - Encourage verbalization of concerns/fears  - Identify coping mechanisms  - Assist in developing anxiety-reducing skills  - Administer/offer alternative therapies  - Limit or eliminate stimulants  Outcome: Progressing     Problem: DISCHARGE PLANNING - CARE MANAGEMENT  Goal: Discharge to post-acute care or home with appropriate resources  Description: INTERVENTIONS:  - Conduct assessment to determine patient/family and health care team treatment goals, and need for post-acute services based on payer coverage, community resources, and patient preferences, and barriers to discharge  - Address psychosocial, clinical, and financial barriers to discharge as identified in assessment in conjunction with the patient/family and health care team  - Arrange appropriate level of post-acute services according to patient’s   needs and preference and payer coverage in collaboration with the physician and health care team  -  Communicate with and update the patient/family, physician, and health care team regarding progress on the discharge plan  - Arrange appropriate transportation to post-acute venues  Outcome: Progressing     Problem: Knowledge Deficit  Goal: Patient/family/caregiver demonstrates understanding of disease process, treatment plan, medications, and discharge instructions  Description: Complete learning assessment and assess knowledge base.  Interventions:  - Provide teaching at level of understanding  - Provide teaching via preferred learning methods  Outcome: Progressing     Problem: SLEEP DISTURBANCE  Goal: Will exhibit normal sleeping pattern  Description: Interventions:  -  Assess the patients sleep pattern, noting recent changes  - Administer medication as ordered  - Decrease environmental stimuli, including noise, as appropriate during the night  - Encourage the patient to actively participate in unit groups and or exercise during the day to enhance ability to achieve adequate sleep at night  - Assess the patient, in the morning, encouraging a description of sleep experience  Outcome: Progressing

## 2025-01-22 NOTE — PROGRESS NOTES
01/22/25 0839   Team Meeting   Meeting Type Daily Rounds   Team Members Present   Team Members Present Physician;;Nurse   Physician Team Member Ary   [facility] Noa   Patient/Family Present   Patient Present No   Patient's Family Present No     Pt is medication and meal compliant. Pt denies SI/HI/AVH. Pt is calm and cooperative. Pt spoke with [facility] yesterday and updated them on finances. Pt's discharge is pending placement.

## 2025-01-22 NOTE — NURSING NOTE
Pleasant, calm and cooperative. Seclusive to his room. Only out for his meal trays as of this time. Denies symptoms. Compliant with medications. Waiting placement.

## 2025-01-22 NOTE — PROGRESS NOTES
"Progress Note - Behavioral Health     Franco Roberson 39 y.o. male MRN: [id number]   Unit/Bed#: U 342-01 Encounter: [id number]  Assessment & Plan  MDD (major depressive disorder), recurrent severe, without psychosis (HCC)  Patient continues to do well and the plan is to continue with the current treatment plan as mentioned below with no changes.    Patient continues to be pending group home placement. Otherwise no clinical significant change.   was able to contact Social Security office again and recent confirmation of patient's money being in [employer] account    Medication regimen as follows, no changes as of 1/22/2025:  Abilify 5 mg daily as mood adjunct   Zoloft 150 mg daily for depression and anxiety  Continue to encourage participation in group therapy, milieu therapy and occupational therapy.  Continue to assess for side effects of medications.  Continue collaboration with [facility] for medical co-morbidities as indicated.  Continue discussion with CM/SW to assist with obtaining collateral, disposition planning, and the implementation of patient-centered individualized plan of care.  Continue frequent safety checks and vitals per unit protocol.    Risks, benefits and possible side effects of Medications: Risks, benefits, and possible side effects of medications have previously been explained. No new medications at this time.      Legal status: 201    Disposition: to be determined, pending SOAR application for potential group home placement. OT Cognitive Evaluation completed: \"Pt would benefit from discharge to a supportive environment that can provide checks for safety and compliance with IADLs. \"   Although patient's mood has stabilized, they are currently awaiting group home placement.  Their ability to recognize safety hazards take medications and participate in IADLs are significantly impaired by their cognitive deficits compounded by their chronic mental health needs and would be at risk for significant " decompensation without the structure and support of a group home setting.      No associated orders from this encounter found during lookback period of 72 hours.  Autism spectrum disorder  Continue supportive care    No associated orders from this encounter found during lookback period of 72 hours.         Recommended Treatment:     Planned medication and treatment changes:    All current active medications have been reviewed  Encourage group therapy, milieu therapy and occupational therapy  Behavioral Health checks for safety monitoring      Current medications:  Current Facility-Administered Medications   Medication Dose Route Frequency Provider Last Rate    acetaminophen  650 mg Oral Q6H PRN Basilio Brown MD      acetaminophen  650 mg Oral Q4H PRN Basilio Brown MD      acetaminophen  975 mg Oral Q6H PRN Basilio Brown MD      aluminum-magnesium hydroxide-simethicone  30 mL Oral Q4H PRN Basilio Brown MD      ARIPiprazole  5 mg Oral Daily Basilio Panda MD      Artificial Tears  1 drop Both Eyes Q3H PRN Basilio Brown MD      atorvastatin  10 mg Oral Daily With Dinner WALYL Baptiste      benztropine  1 mg Intramuscular Q4H PRN Max 6/day Basilio Brown MD      benztropine  1 mg Oral Q4H PRN Max 6/day Basilio Brown MD      Cholecalciferol  2,500 Units Oral Daily Dustin Mcallister MD      cyanocobalamin  1,000 mcg Oral Daily WALLY Baptiste      Diclofenac Sodium  2 g Topical 4x Daily PRN WALLY Baptiste      hydrOXYzine HCL  50 mg Oral Q6H PRN Max 4/day Basilio Brown MD      Or    diphenhydrAMINE  50 mg Intramuscular Q6H PRN Basilio Brown MD      diphenhydrAMINE-zinc acetate   Topical Daily PRN Raj Guerrero MD      famotidine  20 mg Oral BID WALLY Baptiste      hydrOXYzine HCL  100 mg Oral Q6H PRN Max 4/day Basilio Brown MD      Or    LORazepam  2 mg Intramuscular Q6H PRN Basilio Brown MD       hydrOXYzine HCL  25 mg Oral Q6H PRN Max 4/day Basilio Brown MD      levothyroxine  37.5 mcg Oral Early Morning WALLY Baptiste      melatonin  6 mg Oral HS PRN WALLY Gomez      methocarbamol  500 mg Oral Q6H PRN WALLY Baptiste      OLANZapine  5 mg Oral Q4H PRN Max 3/day Basilio Brown MD      Or    OLANZapine  2.5 mg Intramuscular Q4H PRN Max 3/day Basilio Brown MD      OLANZapine  5 mg Oral Q3H PRN Max 3/day Basilio Brown MD      Or    OLANZapine  5 mg Intramuscular Q3H PRN Max 3/day Basilio Brown MD      OLANZapine  2.5 mg Oral Q4H PRN Max 6/day Basilio Brown MD      polyethylene glycol  17 g Oral Daily PRN Basilio Brown MD      propranolol  10 mg Oral Q8H PRN Basilio Brown MD      senna-docusate sodium  2 tablet Oral After Dinner Dustin Mcallister MD      sertraline  150 mg Oral Daily WALLY Gomez      traZODone  50 mg Oral HS PRN Basilio Brown MD         Risks / Benefits of Treatment:    Risks, benefits, and possible side effects of medications explained to patient and patient verbalizes understanding and agreement for treatment.    Subjective:    Behavior over the last 24 hours: unchanged.     Franco was seen today in psychiatric follow-up. Per staff, no acute issues reported overnight. He is seen resting in bed comfortably without any signs of distress. He appears slightly disheveled bearded with greasy hair pulled back. He tells writer as a joke he is growing out his hair until he is officially placed. States there has been some movement with his social security. Slightly brightens throughout. Otherwise he offers no acute complaints. He is currently at psychiatric baseline. He denies SI/HI/AVH. Seen interacting in milieu and attending groups, otherwise cooperative with treatment needs.     Sleep: normal  Appetite: normal  Medication side effects: No   ROS: no complaints    Mental Status  Evaluation:    Appearance:  disheveled, looks stated age   Behavior:  cooperative, calm   Speech:  normal rate, normal volume   Mood:  euthymic   Affect:  constricted   Thought Process:  goal directed, linear   Associations: concrete associations   Thought Content:  no overt delusions   Perceptual Disturbances: no auditory hallucinations, no visual hallucinations, does not appear responding to internal stimuli   Risk Potential: Suicidal ideation - None at present  Homicidal ideation - None at present  Potential for aggression - No   Sensorium:  oriented to person, place, and time/date   Memory:  recent and remote memory grossly intact   Consciousness:  alert and awake   Attention/Concentration: attention span and concentration are age appropriate   Insight:  fair   Judgment: limited   Gait/Station: normal gait/station   Motor Activity: no abnormal movements     Vital signs in last 24 hours:    Temp:  [97.2 °F (36.2 °C)-97.5 °F (36.4 °C)] 97.2 °F (36.2 °C)  HR:  [63-77] 63  BP: (120)/(68-73) 120/73  SpO2:  [97 %] 97 %  O2 Device: None (Room air)    Laboratory results: I have personally reviewed all pertinent laboratory/tests results    Results from the past 24 hours: No results found for this or any previous visit (from the past 24 hours).    Suicide/Homicide Risk Assessment:    Risk of Harm to Self:   Nursing Suicide Risk Assessment Last 24 hours: C-SSRS Risk (Since Last Contact)  Calculated C-SSRS Risk Score (Since Last Contact): No Risk Indicated    Risk of Harm to Others:  Nursing Homicide Risk Assessment: Violence Risk to Others: Denies within past 6 months    The following interventions are recommended: Behavioral Health checks for safety monitoring, continued hospitalization on locked unit    Progress Toward Goals: progressing    Counseling / Coordination of Care:    Total floor / unit time spent today 25 minutes. Greater than 50% of total time was spent with the patient and / or family counseling and / or  coordination of care. A description of counseling / coordination of care:    Elizabeth Pennington PA-C 01/22/25

## 2025-01-22 NOTE — ASSESSMENT & PLAN NOTE
"Patient continues to do well and the plan is to continue with the current treatment plan as mentioned below with no changes.    Patient continues to be pending group home placement. Otherwise no clinical significant change.   was able to contact Social Security office again and recent confirmation of patient's money being in [employer] account    Medication regimen as follows, no changes as of 1/22/2025:  Abilify 5 mg daily as mood adjunct   Zoloft 150 mg daily for depression and anxiety  Continue to encourage participation in group therapy, milieu therapy and occupational therapy.  Continue to assess for side effects of medications.  Continue collaboration with [facility] for medical co-morbidities as indicated.  Continue discussion with CM/SW to assist with obtaining collateral, disposition planning, and the implementation of patient-centered individualized plan of care.  Continue frequent safety checks and vitals per unit protocol.    Risks, benefits and possible side effects of Medications: Risks, benefits, and possible side effects of medications have previously been explained. No new medications at this time.      Legal status: 201    Disposition: to be determined, pending SOAR application for potential group home placement. OT Cognitive Evaluation completed: \"Pt would benefit from discharge to a supportive environment that can provide checks for safety and compliance with IADLs. \"   Although patient's mood has stabilized, they are currently awaiting group home placement.  Their ability to recognize safety hazards take medications and participate in IADLs are significantly impaired by their cognitive deficits compounded by their chronic mental health needs and would be at risk for significant decompensation without the structure and support of a group home setting.      No associated orders from this encounter found during lookback period of 72 hours.  "

## 2025-01-22 NOTE — SOCIAL WORK
Cm and Pt spoke with [facility]. They are now requesting bank statements for [employer] checking account.

## 2025-01-23 LAB
CHOLEST SERPL-MCNC: 203 MG/DL (ref ?–200)
HDLC SERPL-MCNC: 45 MG/DL
LDLC SERPL CALC-MCNC: 114 MG/DL (ref 0–100)
NONHDLC SERPL-MCNC: 158 MG/DL
TRIGL SERPL-MCNC: 222 MG/DL (ref ?–150)

## 2025-01-23 PROCEDURE — 99232 SBSQ HOSP IP/OBS MODERATE 35: CPT | Performed by: PSYCHIATRY & NEUROLOGY

## 2025-01-23 PROCEDURE — 80061 LIPID PANEL: CPT | Performed by: PSYCHIATRY & NEUROLOGY

## 2025-01-23 RX ORDER — ATORVASTATIN CALCIUM 40 MG/1
40 TABLET, FILM COATED ORAL
Status: DISPENSED | OUTPATIENT
Start: 2025-01-23

## 2025-01-23 RX ADMIN — FAMOTIDINE 20 MG: 20 TABLET ORAL at 17:36

## 2025-01-23 RX ADMIN — SERTRALINE HYDROCHLORIDE 150 MG: 100 TABLET ORAL at 08:22

## 2025-01-23 RX ADMIN — LEVOTHYROXINE SODIUM 37.5 MCG: 125 TABLET ORAL at 06:14

## 2025-01-23 RX ADMIN — SENNOSIDES AND DOCUSATE SODIUM 2 TABLET: 50; 8.6 TABLET ORAL at 17:36

## 2025-01-23 RX ADMIN — FAMOTIDINE 20 MG: 20 TABLET ORAL at 08:22

## 2025-01-23 RX ADMIN — ARIPIPRAZOLE 5 MG: 5 TABLET ORAL at 08:22

## 2025-01-23 RX ADMIN — CYANOCOBALAMIN TAB 1000 MCG 1000 MCG: 1000 TAB at 08:22

## 2025-01-23 RX ADMIN — ATORVASTATIN CALCIUM 40 MG: 40 TABLET, FILM COATED ORAL at 17:36

## 2025-01-23 RX ADMIN — Medication 2500 UNITS: at 08:22

## 2025-01-23 NOTE — NURSING NOTE
Patient has only been out of bed as of this time for breakfast. Compliant with medications this morning. Denies symptoms. Pleasant, calm and cooperative. Waiting placement.

## 2025-01-23 NOTE — ASSESSMENT & PLAN NOTE
"Patient continues to do well and the plan is to continue with the current treatment plan as mentioned below with no changes.    Patient continues to be pending group home placement. Otherwise no clinical significant change.  SW and pt cont to work on getting documentation to [facility] office.    Recent lipid panel shows elevated triglycerides total cholesterol and LDL.  Touch base with medicine about this.  They will increase Lipitor to 40 mg from 10.    Medication regimen as follows, no changes as of 1/23/2025:  Abilify 5 mg daily as mood adjunct   Zoloft 150 mg daily for depression and anxiety  Continue to encourage participation in group therapy, milieu therapy and occupational therapy.  Continue to assess for side effects of medications.  Continue collaboration with [facility] for medical co-morbidities as indicated.  Continue discussion with CM/SW to assist with obtaining collateral, disposition planning, and the implementation of patient-centered individualized plan of care.  Continue frequent safety checks and vitals per unit protocol.    Risks, benefits and possible side effects of Medications: Risks, benefits, and possible side effects of medications have previously been explained. No new medications at this time.      Legal status: 201    Disposition: to be determined, pending SOAR application for potential group home placement. OT Cognitive Evaluation completed: \"Pt would benefit from discharge to a supportive environment that can provide checks for safety and compliance with IADLs. \"   Although patient's mood has stabilized, they are currently awaiting group home placement.  Their ability to recognize safety hazards take medications and participate in IADLs are significantly impaired by their cognitive deficits compounded by their chronic mental health needs and would be at risk for significant decompensation without the structure and support of a group home setting.      No associated orders from this encounter " found during lookback period of 72 hours.

## 2025-01-23 NOTE — PROGRESS NOTES
"Progress Note - Behavioral Health   Name: Franco Roberson 39 y.o. male I MRN: [id number]  Unit/Bed#: -01 I Date of Admission: 5/14/2024   Date of Service: 1/23/2025 I Hospital Day: 254     Assessment & Plan  MDD (major depressive disorder), recurrent severe, without psychosis (HCC)  Patient continues to do well and the plan is to continue with the current treatment plan as mentioned below with no changes.    Patient continues to be pending group home placement. Otherwise no clinical significant change.  SW and pt cont to work on getting documentation to [facility] office.    Recent lipid panel shows elevated triglycerides total cholesterol and LDL.  Touch base with medicine about this.  They will increase Lipitor to 40 mg from 10.    Medication regimen as follows, no changes as of 1/23/2025:  Abilify 5 mg daily as mood adjunct   Zoloft 150 mg daily for depression and anxiety  Continue to encourage participation in group therapy, milieu therapy and occupational therapy.  Continue to assess for side effects of medications.  Continue collaboration with [facility] for medical co-morbidities as indicated.  Continue discussion with CM/SW to assist with obtaining collateral, disposition planning, and the implementation of patient-centered individualized plan of care.  Continue frequent safety checks and vitals per unit protocol.    Risks, benefits and possible side effects of Medications: Risks, benefits, and possible side effects of medications have previously been explained. No new medications at this time.      Legal status: 201    Disposition: to be determined, pending SOAR application for potential group home placement. OT Cognitive Evaluation completed: \"Pt would benefit from discharge to a supportive environment that can provide checks for safety and compliance with IADLs. \"   Although patient's mood has stabilized, they are currently awaiting group home placement.  Their ability to recognize safety hazards take medications " and participate in IADLs are significantly impaired by their cognitive deficits compounded by their chronic mental health needs and would be at risk for significant decompensation without the structure and support of a group home setting.      No associated orders from this encounter found during lookback period of 72 hours.  Autism spectrum disorder  Continue supportive care    No associated orders from this encounter found during lookback period of 72 hours.    Hyperlipidemia  -[facility] to increase Lipitor to 40 mg       Plan     Recommended Treatment:    - Encourage early mobility and having a structured day  - Provide frequent re-orientation, and cognitive stimulation  - Ensure assistive devices are in proper working order (eye-glasses, hearing aids)  - Encourage adequate hydration, nutrition and monitor bowel movements  - Maintain sleep-wake cycle: Uninterrupted sleep time; low-level lighting at night  - Fall precaution  - f/u [facility] recs regarding the medical problems   - Continue medication titration and treatment plan; adjust medication to optimize treatment response and as clinically indicated. .   - Observation: routine            - VS: as per unit protocol  - Diet: Regular diet  - Encourage group attendance and milieu therapy  - Dispo: To be determined     Scheduled medications:  Current Facility-Administered Medications   Medication Dose Route Frequency Provider Last Rate    acetaminophen  650 mg Oral Q6H PRN Basilio Brown MD      acetaminophen  650 mg Oral Q4H PRN Basilio Brown MD      acetaminophen  975 mg Oral Q6H PRN Basilio Brown MD      aluminum-magnesium hydroxide-simethicone  30 mL Oral Q4H PRN Basilio Brown MD      ARIPiprazole  5 mg Oral Daily Basilio Panda MD      Artificial Tears  1 drop Both Eyes Q3H PRN Basilio Brown MD      atorvastatin  40 mg Oral Daily With Dinner WALLY Baptiste      benztropine  1 mg Intramuscular Q4H PRN Max 6/day Basilio  Jose Boss MD      benztropine  1 mg Oral Q4H PRN Max 6/day Basilio Brown MD      Cholecalciferol  2,500 Units Oral Daily Dustin Mcallister MD      cyanocobalamin  1,000 mcg Oral Daily WALLY Baptiste      Diclofenac Sodium  2 g Topical 4x Daily PRN WALLY Baptiste      hydrOXYzine HCL  50 mg Oral Q6H PRN Max 4/day Basilio Brown MD      Or    diphenhydrAMINE  50 mg Intramuscular Q6H PRN Basilio Brown MD      diphenhydrAMINE-zinc acetate   Topical Daily PRN Raj Guerrero MD      famotidine  20 mg Oral BID WALLY Baptiste      hydrOXYzine HCL  100 mg Oral Q6H PRN Max 4/day Basilio Brown MD      Or    LORazepam  2 mg Intramuscular Q6H PRN Basilio Brown MD      hydrOXYzine HCL  25 mg Oral Q6H PRN Max 4/day Basilio Brown MD      levothyroxine  37.5 mcg Oral Early Morning WALLY Baptiste      melatonin  6 mg Oral HS PRN WALLY Gomez      methocarbamol  500 mg Oral Q6H PRN WALLY Baptiste      OLANZapine  5 mg Oral Q4H PRN Max 3/day Basilio Brown MD      Or    OLANZapine  2.5 mg Intramuscular Q4H PRN Max 3/day Basilio Brown MD      OLANZapine  5 mg Oral Q3H PRN Max 3/day Basilio Brown MD      Or    OLANZapine  5 mg Intramuscular Q3H PRN Max 3/day Basilio Brown MD      OLANZapine  2.5 mg Oral Q4H PRN Max 6/day Basilio Brown MD      polyethylene glycol  17 g Oral Daily PRN Basilio Brown MD      propranolol  10 mg Oral Q8H PRN Basilio Brown MD      senna-docusate sodium  2 tablet Oral After Dinner Dustin Mcallister MD      sertraline  150 mg Oral Daily WALLY Gomez      traZODone  50 mg Oral HS PRN Basilio Brown MD        PRN:    acetaminophen    acetaminophen    acetaminophen    aluminum-magnesium hydroxide-simethicone    Artificial Tears    benztropine    benztropine    Diclofenac Sodium    hydrOXYzine HCL **OR** diphenhydrAMINE    diphenhydrAMINE-zinc  "acetate    hydrOXYzine HCL **OR** LORazepam    hydrOXYzine HCL    melatonin    methocarbamol    OLANZapine **OR** OLANZapine    OLANZapine **OR** OLANZapine    OLANZapine    polyethylene glycol    propranolol    traZODone       Subjective     Patient was visited on unit for continuing care; chart reviewed and discussed with multidisciplinary treatment team.  On approach, the patient was calm and cooperative. Denied any changes in mood, appetite, and energy level. Cont to work w/ SW regarding getting documents from [facility] to [facility] office per their request. No problem initiating and maintaining sleep.  Denied A/VH currently.  Denied SI/HI, intent or plan upon direct inquiry at this time.    Patient continues to be visible in the milieu and interacts with staff and peers. No reports of aggression or self-injurious behavior on unit. No PRN medications used in the past 24 hours.    Patient accepted all offered medications and no adverse effects of medications noted or reported.    Objective    Current Mental Status Evaluation:  Mental Status Exam  Appearance: casually dressed, consistent with stated age  Motor: no psychomotor retardation, no gait abnormalities  Behavior: cooperative, answers questions appropriately  Speech: soft, normal rhythm  Mood: \"ok\"  Affect: constricted  Thought Process: linear and goal-oriented  Thought Content: denies delusions  Risk Potential: denies suicidal ideation, plan, or intent. Denies homicidal ideation  Perceptions: denies auditory hallucinations, denies visual hallucinations,   Sensorium: Oriented to person, place, time, and situation  Cognition: cognitive ability appears intact but was not quantitatively tested  Consciousness: alert and awake  Attention: intact, able to focus without difficulty  Insight: limited  Judgement: limited            Vital signs in last 24 hours:    Temp:  [97.3 °F (36.3 °C)-98.1 °F (36.7 °C)] 97.3 °F (36.3 °C)  HR:  [66-89] 66  BP: (106)/(58-65) 106/65  Resp:  " [16] 16  SpO2:  [96 %-97 %] 97 %  O2 Device: None (Room air)    Psychiatric Review of Systems:  Medication adverse effects: none  Sleep: unchanged  Appetite: unchanged  Behaviors over the past 24 hours: unchanged    Laboratory results:    I have personally reviewed all pertinent laboratory/tests results  Recent Results (from the past 48 hours)   Lipid panel    Collection Time: 01/23/25  5:58 AM   Result Value Ref Range    Cholesterol 203 (H) See Comment mg/dL    Triglycerides 222 (H) See Comment mg/dL    HDL, Direct 45 >=40 mg/dL    LDL Calculated 114 (H) 0 - 100 mg/dL    Non-HDL-Chol (CHOL-HDL) 158 mg/dl          Progress Toward Goals & Illness Status:   progressing, mood is stabilizing, placement pending    Patient is not at goal. They are not yet ready for discharge. The patient's condition currently requires active psychopharmacological medication management, interdisciplinary coordination with case management, and the utilization of adjunctive milieu and group therapy to augment psychopharmacological efficacy. The patient's risk of morbidity, and progression or decompensation of psychiatric disease, is higher without this current treatment.     Next of Kin  Extended Emergency Contact Information  Primary Emergency Contact: Lois Roberson  Address: [place] [address]           [address] [place]  Home Phone: [phone]  Relation: Mother    Counseling / Coordination of Care  Patient's progress discussed with staff in treatment team meeting.  Medications, treatment progress and treatment plan reviewed with patient.  Medication education provided to patient.  Educated on importance of medication and treatment compliance.  Supportive therapy provided to patient.  Cognitive techniques utilized during the session.  Reassurance and supportive therapy provided.  Reoriented to reality and reassured.  Encouraged participation in milieu and group therapy on the unit.  Crisis/safety  plan discussed with patient.       Dustin Mcallister MD  Attending Psychiatrist   [facility]      This note has been constructed using a voice recognition system. There may be translation, syntax, or grammatical errors. If you have any questions, please contact the dictating provider.

## 2025-01-23 NOTE — PROGRESS NOTES
01/23/25 0869   Team Meeting   Meeting Type Daily Rounds   Team Members Present   Team Members Present Physician;Nurse;   Physician Team Member Ary   [facility] Suhas   Patient/Family Present   Patient Present No   Patient's Family Present No     Pt med/meal compliant. Visible on the unit. Social with select peer. Discharge pending placement.

## 2025-01-23 NOTE — PLAN OF CARE
Problem: Ineffective Coping  Goal: Participates in unit activities  Description: Interventions:  - Provide therapeutic environment   - Provide required programming   - Redirect inappropriate behaviors   Outcome: Progressing     Problem: Depression  Goal: Treatment Goal: Demonstrate behavioral control of depressive symptoms, verbalize feelings of improved mood/affect, and adopt new coping skills prior to discharge  Outcome: Progressing  Goal: Verbalize thoughts and feelings  Description: Interventions:  - Assess and re-assess patient's level of risk   - Engage patient in 1:1 interactions, daily, for a minimum of 15 minutes   - Encourage patient to express feelings, fears, frustrations, hopes   Outcome: Progressing  Goal: Refrain from harming self  Description: Interventions:  - Monitor patient closely, per order   - Supervise medication ingestion, monitor effects and side effects   Outcome: Progressing  Goal: Refrain from isolation  Description: Interventions:  - Develop a trusting relationship   - Encourage socialization   Outcome: Progressing     Problem: Anxiety  Goal: Anxiety is at manageable level  Description: Interventions:  - Assess and monitor patient's anxiety level.   - Monitor for signs and symptoms (heart palpitations, chest pain, shortness of breath, headaches, nausea, feeling jumpy, restlessness, irritable, apprehensive).   - Collaborate with interdisciplinary team and initiate plan and interventions as ordered.  - [place] patient to unit/surroundings  - Explain treatment plan  - Encourage participation in care  - Encourage verbalization of concerns/fears  - Identify coping mechanisms  - Assist in developing anxiety-reducing skills  - Administer/offer alternative therapies  - Limit or eliminate stimulants  Outcome: Progressing     Problem: DISCHARGE PLANNING - CARE MANAGEMENT  Goal: Discharge to post-acute care or home with appropriate resources  Description: INTERVENTIONS:  - Conduct assessment to  determine patient/family and health care team treatment goals, and need for post-acute services based on payer coverage, community resources, and patient preferences, and barriers to discharge  - Address psychosocial, clinical, and financial barriers to discharge as identified in assessment in conjunction with the patient/family and health care team  - Arrange appropriate level of post-acute services according to patient’s   needs and preference and payer coverage in collaboration with the physician and health care team  - Communicate with and update the patient/family, physician, and health care team regarding progress on the discharge plan  - Arrange appropriate transportation to post-acute venues  Outcome: Not Progressing     Problem: Knowledge Deficit  Goal: Patient/family/caregiver demonstrates understanding of disease process, treatment plan, medications, and discharge instructions  Description: Complete learning assessment and assess knowledge base.  Interventions:  - Provide teaching at level of understanding  - Provide teaching via preferred learning methods  Outcome: Progressing     Problem: SLEEP DISTURBANCE  Goal: Will exhibit normal sleeping pattern  Description: Interventions:  -  Assess the patients sleep pattern, noting recent changes  - Administer medication as ordered  - Decrease environmental stimuli, including noise, as appropriate during the night  - Encourage the patient to actively participate in unit groups and or exercise during the day to enhance ability to achieve adequate sleep at night  - Assess the patient, in the morning, encouraging a description of sleep experience  Outcome: Progressing      Josefina Anthony  (RN)  2019 23:17:59

## 2025-01-23 NOTE — NURSING NOTE
Patient secluded to room this shift, appears withdrawn,depressed and disheveled. Patient denies depression, SI/HI/AH/VH at this time. Compliant with medications and routine vitals.

## 2025-01-24 PROCEDURE — 99232 SBSQ HOSP IP/OBS MODERATE 35: CPT | Performed by: PSYCHIATRY & NEUROLOGY

## 2025-01-24 RX ADMIN — FAMOTIDINE 20 MG: 20 TABLET ORAL at 18:26

## 2025-01-24 RX ADMIN — Medication 2500 UNITS: at 08:22

## 2025-01-24 RX ADMIN — SENNOSIDES AND DOCUSATE SODIUM 2 TABLET: 50; 8.6 TABLET ORAL at 18:26

## 2025-01-24 RX ADMIN — CYANOCOBALAMIN TAB 1000 MCG 1000 MCG: 1000 TAB at 08:22

## 2025-01-24 RX ADMIN — FAMOTIDINE 20 MG: 20 TABLET ORAL at 08:22

## 2025-01-24 RX ADMIN — ARIPIPRAZOLE 5 MG: 5 TABLET ORAL at 08:22

## 2025-01-24 RX ADMIN — ATORVASTATIN CALCIUM 40 MG: 40 TABLET, FILM COATED ORAL at 18:26

## 2025-01-24 RX ADMIN — SERTRALINE HYDROCHLORIDE 150 MG: 100 TABLET ORAL at 08:22

## 2025-01-24 RX ADMIN — LEVOTHYROXINE SODIUM 37.5 MCG: 125 TABLET ORAL at 06:52

## 2025-01-24 NOTE — PLAN OF CARE
Problem: Depression  Goal: Treatment Goal: Demonstrate behavioral control of depressive symptoms, verbalize feelings of improved mood/affect, and adopt new coping skills prior to discharge  Outcome: Progressing  Goal: Verbalize thoughts and feelings  Description: Interventions:  - Assess and re-assess patient's level of risk   - Engage patient in 1:1 interactions, daily, for a minimum of 15 minutes   - Encourage patient to express feelings, fears, frustrations, hopes   Outcome: Progressing  Goal: Refrain from harming self  Description: Interventions:  - Monitor patient closely, per order   - Supervise medication ingestion, monitor effects and side effects   Outcome: Progressing  Goal: Refrain from isolation  Description: Interventions:  - Develop a trusting relationship   - Encourage socialization   Outcome: Progressing     Problem: Anxiety  Goal: Anxiety is at manageable level  Description: Interventions:  - Assess and monitor patient's anxiety level.   - Monitor for signs and symptoms (heart palpitations, chest pain, shortness of breath, headaches, nausea, feeling jumpy, restlessness, irritable, apprehensive).   - Collaborate with interdisciplinary team and initiate plan and interventions as ordered.  - [place] patient to unit/surroundings  - Explain treatment plan  - Encourage participation in care  - Encourage verbalization of concerns/fears  - Identify coping mechanisms  - Assist in developing anxiety-reducing skills  - Administer/offer alternative therapies  - Limit or eliminate stimulants  Outcome: Progressing     Problem: DISCHARGE PLANNING - CARE MANAGEMENT  Goal: Discharge to post-acute care or home with appropriate resources  Description: INTERVENTIONS:  - Conduct assessment to determine patient/family and health care team treatment goals, and need for post-acute services based on payer coverage, community resources, and patient preferences, and barriers to discharge  - Address psychosocial, clinical,  and financial barriers to discharge as identified in assessment in conjunction with the patient/family and health care team  - Arrange appropriate level of post-acute services according to patient’s   needs and preference and payer coverage in collaboration with the physician and health care team  - Communicate with and update the patient/family, physician, and health care team regarding progress on the discharge plan  - Arrange appropriate transportation to post-acute venues  Outcome: Progressing     Problem: Knowledge Deficit  Goal: Patient/family/caregiver demonstrates understanding of disease process, treatment plan, medications, and discharge instructions  Description: Complete learning assessment and assess knowledge base.  Interventions:  - Provide teaching at level of understanding  - Provide teaching via preferred learning methods  Outcome: Progressing     Problem: SLEEP DISTURBANCE  Goal: Will exhibit normal sleeping pattern  Description: Interventions:  -  Assess the patients sleep pattern, noting recent changes  - Administer medication as ordered  - Decrease environmental stimuli, including noise, as appropriate during the night  - Encourage the patient to actively participate in unit groups and or exercise during the day to enhance ability to achieve adequate sleep at night  - Assess the patient, in the morning, encouraging a description of sleep experience  Outcome: Progressing

## 2025-01-24 NOTE — NURSING NOTE
Patient secluded to room, out to retrieve meal tray. This nurse encouraged patient to attend group with no success.Patient appears to be depressed, denies SI/HI/AH/VH at this time.

## 2025-01-24 NOTE — PROGRESS NOTES
"Progress Note - Behavioral Health   Name: Franco Roberson 39 y.o. male I MRN: [id number]  Unit/Bed#: -01 I Date of Admission: 5/14/2024   Date of Service: 1/24/2025 I Hospital Day: 255     Assessment & Plan  MDD (major depressive disorder), recurrent severe, without psychosis (HCC)  Patient continues to do well and the plan is to continue with the current treatment plan as mentioned below with no changes.    Patient continues to be pending group home placement. Otherwise no clinical significant change.  SW and pt cont to work on getting documentation to [facility] office.      Medication regimen as follows, no changes as of 1/24/2025:  Abilify 5 mg daily as mood adjunct   Zoloft 150 mg daily for depression and anxiety  Continue to encourage participation in group therapy, milieu therapy and occupational therapy.  Continue to assess for side effects of medications.  Continue collaboration with SLIM for medical co-morbidities as indicated.  Continue discussion with CM/SW to assist with obtaining collateral, disposition planning, and the implementation of patient-centered individualized plan of care.  Continue frequent safety checks and vitals per unit protocol.    Risks, benefits and possible side effects of Medications: Risks, benefits, and possible side effects of medications have previously been explained. No new medications at this time.      Legal status: 201    Disposition: to be determined, pending SOAR application for potential group home placement. OT Cognitive Evaluation completed: \"Pt would benefit from discharge to a supportive environment that can provide checks for safety and compliance with IADLs. \"   Although patient's mood has stabilized, they are currently awaiting group home placement.  Their ability to recognize safety hazards take medications and participate in IADLs are significantly impaired by their cognitive deficits compounded by their chronic mental health needs and would be at risk for " significant decompensation without the structure and support of a group home setting.      No associated orders from this encounter found during lookback period of 72 hours.  Autism spectrum disorder  Continue supportive care    No associated orders from this encounter found during lookback period of 72 hours.    Hyperlipidemia  - Lipitor 40 mg       Plan     Recommended Treatment:    - Encourage early mobility and having a structured day  - Provide frequent re-orientation, and cognitive stimulation  - Ensure assistive devices are in proper working order (eye-glasses, hearing aids)  - Encourage adequate hydration, nutrition and monitor bowel movements  - Maintain sleep-wake cycle: Uninterrupted sleep time; low-level lighting at night  - Fall precaution  - f/u SLIM recs regarding the medical problems   - Continue medication titration and treatment plan; adjust medication to optimize treatment response and as clinically indicated. .   - Observation: routine            - VS: as per unit protocol  - Diet: Regular diet  - Encourage group attendance and milieu therapy  - Dispo: To be determined     Scheduled medications:  Current Facility-Administered Medications   Medication Dose Route Frequency Provider Last Rate    acetaminophen  650 mg Oral Q6H PRN Basilio Brown MD      acetaminophen  650 mg Oral Q4H PRN Basilio Brown MD      acetaminophen  975 mg Oral Q6H PRN Basilio Brown MD      aluminum-magnesium hydroxide-simethicone  30 mL Oral Q4H PRN Basilio Brown MD      ARIPiprazole  5 mg Oral Daily Basilio Panda MD      Artificial Tears  1 drop Both Eyes Q3H PRN Basilio Brown MD      atorvastatin  40 mg Oral Daily With Dinner WALLY Baptiste      benztropine  1 mg Intramuscular Q4H PRN Max 6/day Basilio Brown MD      benztropine  1 mg Oral Q4H PRN Max 6/day Basilio Brown MD      Cholecalciferol  2,500 Units Oral Daily Dustin Mcallister MD      cyanocobalamin  1,000  mcg Oral Daily WALLY Baptiste      Diclofenac Sodium  2 g Topical 4x Daily PRN WALLY Baptiste      hydrOXYzine HCL  50 mg Oral Q6H PRN Max 4/day Basilio Brown MD      Or    diphenhydrAMINE  50 mg Intramuscular Q6H PRN Basilio Brown MD      diphenhydrAMINE-zinc acetate   Topical Daily PRN Raj Guerrero MD      famotidine  20 mg Oral BID WALLY Baptiste      hydrOXYzine HCL  100 mg Oral Q6H PRN Max 4/day Basilio Brown MD      Or    LORazepam  2 mg Intramuscular Q6H PRN Basilio Brown MD      hydrOXYzine HCL  25 mg Oral Q6H PRN Max 4/day Basilio Brown MD      levothyroxine  37.5 mcg Oral Early Morning WALLY Baptiste      melatonin  6 mg Oral HS PRN WALLY Gomez      methocarbamol  500 mg Oral Q6H PRN WALLY Baptiste      OLANZapine  5 mg Oral Q4H PRN Max 3/day Basilio Brown MD      Or    OLANZapine  2.5 mg Intramuscular Q4H PRN Max 3/day Basilio Brown MD      OLANZapine  5 mg Oral Q3H PRN Max 3/day Basilio Brown MD      Or    OLANZapine  5 mg Intramuscular Q3H PRN Max 3/day Basilio Brown MD      OLANZapine  2.5 mg Oral Q4H PRN Max 6/day Basilio Brown MD      polyethylene glycol  17 g Oral Daily PRN Basilio Brown MD      propranolol  10 mg Oral Q8H PRN Basilio Brown MD      senna-docusate sodium  2 tablet Oral After Dinner Dustin Mcallister MD      sertraline  150 mg Oral Daily WALLY Gomez      traZODone  50 mg Oral HS PRN Basilio Brown MD        PRN:    acetaminophen    acetaminophen    acetaminophen    aluminum-magnesium hydroxide-simethicone    Artificial Tears    benztropine    benztropine    Diclofenac Sodium    hydrOXYzine HCL **OR** diphenhydrAMINE    diphenhydrAMINE-zinc acetate    hydrOXYzine HCL **OR** LORazepam    hydrOXYzine HCL    melatonin    methocarbamol    OLANZapine **OR** OLANZapine    OLANZapine **OR** OLANZapine    OLANZapine     "polyethylene glycol    propranolol    traZODone       Subjective     Patient was visited on unit for continuing care; chart reviewed and discussed with multidisciplinary treatment team.  On approach, the patient was calm and cooperative. Denied any changes in mood, appetite, and energy level.  Reported some difficulty getting to sleep last night secondary to his roommate being disruptive.  Informed patient that should he need to he can ask for earplugs from nursing.  Denied A/VH currently.  Denied SI/HI, intent or plan upon direct inquiry at this time.    Patient continues to be visible in the milieu and interacts with staff and peers. No reports of aggression or self-injurious behavior on unit. No PRN medications used in the past 24 hours.    Patient accepted all offered medications and no adverse effects of medications noted or reported.    Objective    Current Mental Status Evaluation:  Mental Status Exam  Appearance: casually dressed, consistent with stated age  Motor: no psychomotor retardation, no gait abnormalities  Behavior: cooperative, answers questions appropriately  Speech: soft, normal rhythm  Mood: \"ok\"  Affect: constricted  Thought Process: linear and goal-oriented  Thought Content: denies delusions  Risk Potential: denies suicidal ideation, plan, or intent. Denies homicidal ideation  Perceptions: denies auditory hallucinations, denies visual hallucinations,   Sensorium: Oriented to person, place, time, and situation  Cognition: cognitive ability appears intact but was not quantitatively tested  Consciousness: alert and awake  Attention: intact, able to focus without difficulty  Insight: limited  Judgement: limited            Vital signs in last 24 hours:    Temp:  [96.7 °F (35.9 °C)-97.6 °F (36.4 °C)] 97.6 °F (36.4 °C)  HR:  [61-82] 61  BP: (121-123)/(69-73) 123/73  Resp:  [16-17] 16  SpO2:  [96 %] 96 %  O2 Device: None (Room air)    Psychiatric Review of Systems:  Medication adverse effects: " none  Sleep: unchanged  Appetite: unchanged  Behaviors over the past 24 hours: unchanged    Laboratory results:    I have personally reviewed all pertinent laboratory/tests results  Recent Results (from the past 48 hours)   Lipid panel    Collection Time: 01/23/25  5:58 AM   Result Value Ref Range    Cholesterol 203 (H) See Comment mg/dL    Triglycerides 222 (H) See Comment mg/dL    HDL, Direct 45 >=40 mg/dL    LDL Calculated 114 (H) 0 - 100 mg/dL    Non-HDL-Chol (CHOL-HDL) 158 mg/dl          Progress Toward Goals & Illness Status:   progressing, attends groups, mood is stabilizing, placement pending    Patient is not at goal. They are not yet ready for discharge. The patient's condition currently requires active psychopharmacological medication management, interdisciplinary coordination with case management, and the utilization of adjunctive milieu and group therapy to augment psychopharmacological efficacy. The patient's risk of morbidity, and progression or decompensation of psychiatric disease, is higher without this current treatment.     Next of Kin  Extended Emergency Contact Information  Primary Emergency Contact: Lois Roberson  Address: [address] [place] [address]  Home Phone: [phone]  Relation: Mother    Counseling / Coordination of Care  Patient's progress discussed with staff in treatment team meeting.  Medications, treatment progress and treatment plan reviewed with patient.  Medication education provided to patient.  Educated on importance of medication and treatment compliance.  Supportive therapy provided to patient.  Cognitive techniques utilized during the session.  Reassurance and supportive therapy provided.  Reoriented to reality and reassured.  Encouraged participation in milieu and group therapy on the unit.  Crisis/safety plan discussed with patient.       Dustin Mcallister MD  Attending Psychiatrist   [facility]      This note has been constructed using a voice recognition system. There may be translation, syntax, or grammatical errors. If you have any questions, please contact the dictating provider.

## 2025-01-24 NOTE — PROGRESS NOTES
"Pt and this writer met for a 1:1 engagement. Upon approach Pt is laying in bed.  Pt and this writer spoke about their decline in group attendance. Pt stated that they feel like,\" been regressing since being sick a couple weeks ago. I'll come back to group next week.\" This writer provided therapeutic support and reviewed benefits of attending group. Pt is receptive to the conversation.   "

## 2025-01-24 NOTE — NURSING NOTE
Patient reports sleeping poor last night due to his roommate keeping him up. Currently resting in bed. Denies symptoms. Compliant with medications. Pleasant on approach. Cooperative. Waiting placement.

## 2025-01-24 NOTE — ASSESSMENT & PLAN NOTE
"Patient continues to do well and the plan is to continue with the current treatment plan as mentioned below with no changes.    Patient continues to be pending group home placement. Otherwise no clinical significant change.  SW and pt cont to work on getting documentation to [facility] office.      Medication regimen as follows, no changes as of 1/24/2025:  Abilify 5 mg daily as mood adjunct   Zoloft 150 mg daily for depression and anxiety  Continue to encourage participation in group therapy, milieu therapy and occupational therapy.  Continue to assess for side effects of medications.  Continue collaboration with SLIM for medical co-morbidities as indicated.  Continue discussion with CM/SW to assist with obtaining collateral, disposition planning, and the implementation of patient-centered individualized plan of care.  Continue frequent safety checks and vitals per unit protocol.    Risks, benefits and possible side effects of Medications: Risks, benefits, and possible side effects of medications have previously been explained. No new medications at this time.      Legal status: 201    Disposition: to be determined, pending SOAR application for potential group home placement. OT Cognitive Evaluation completed: \"Pt would benefit from discharge to a supportive environment that can provide checks for safety and compliance with IADLs. \"   Although patient's mood has stabilized, they are currently awaiting group home placement.  Their ability to recognize safety hazards take medications and participate in IADLs are significantly impaired by their cognitive deficits compounded by their chronic mental health needs and would be at risk for significant decompensation without the structure and support of a group home setting.      No associated orders from this encounter found during lookback period of 72 hours.  "

## 2025-01-25 PROCEDURE — 99232 SBSQ HOSP IP/OBS MODERATE 35: CPT | Performed by: PSYCHIATRY & NEUROLOGY

## 2025-01-25 RX ADMIN — Medication 2500 UNITS: at 08:32

## 2025-01-25 RX ADMIN — ARIPIPRAZOLE 5 MG: 5 TABLET ORAL at 08:33

## 2025-01-25 RX ADMIN — FAMOTIDINE 20 MG: 20 TABLET ORAL at 08:33

## 2025-01-25 RX ADMIN — CYANOCOBALAMIN TAB 1000 MCG 1000 MCG: 1000 TAB at 08:33

## 2025-01-25 RX ADMIN — ATORVASTATIN CALCIUM 40 MG: 40 TABLET, FILM COATED ORAL at 17:28

## 2025-01-25 RX ADMIN — SERTRALINE HYDROCHLORIDE 150 MG: 100 TABLET ORAL at 08:33

## 2025-01-25 RX ADMIN — SENNOSIDES AND DOCUSATE SODIUM 2 TABLET: 50; 8.6 TABLET ORAL at 17:28

## 2025-01-25 RX ADMIN — LEVOTHYROXINE SODIUM 37.5 MCG: 125 TABLET ORAL at 06:26

## 2025-01-25 RX ADMIN — FAMOTIDINE 20 MG: 20 TABLET ORAL at 17:28

## 2025-01-25 NOTE — PLAN OF CARE
Problem: Depression  Goal: Treatment Goal: Demonstrate behavioral control of depressive symptoms, verbalize feelings of improved mood/affect, and adopt new coping skills prior to discharge  Outcome: Progressing  Goal: Verbalize thoughts and feelings  Description: Interventions:  - Assess and re-assess patient's level of risk   - Engage patient in 1:1 interactions, daily, for a minimum of 15 minutes   - Encourage patient to express feelings, fears, frustrations, hopes   Outcome: Progressing  Goal: Refrain from harming self  Description: Interventions:  - Monitor patient closely, per order   - Supervise medication ingestion, monitor effects and side effects   Outcome: Progressing  Goal: Refrain from isolation  Description: Interventions:  - Develop a trusting relationship   - Encourage socialization   Outcome: Progressing     Problem: Anxiety  Goal: Anxiety is at manageable level  Description: Interventions:  - Assess and monitor patient's anxiety level.   - Monitor for signs and symptoms (heart palpitations, chest pain, shortness of breath, headaches, nausea, feeling jumpy, restlessness, irritable, apprehensive).   - Collaborate with interdisciplinary team and initiate plan and interventions as ordered.  - [place] patient to unit/surroundings  - Explain treatment plan  - Encourage participation in care  - Encourage verbalization of concerns/fears  - Identify coping mechanisms  - Assist in developing anxiety-reducing skills  - Administer/offer alternative therapies  - Limit or eliminate stimulants  Outcome: Progressing     Problem: DISCHARGE PLANNING - CARE MANAGEMENT  Goal: Discharge to post-acute care or home with appropriate resources  Description: INTERVENTIONS:  - Conduct assessment to determine patient/family and health care team treatment goals, and need for post-acute services based on payer coverage, community resources, and patient preferences, and barriers to discharge  - Address psychosocial, clinical,  and financial barriers to discharge as identified in assessment in conjunction with the patient/family and health care team  - Arrange appropriate level of post-acute services according to patient’s   needs and preference and payer coverage in collaboration with the physician and health care team  - Communicate with and update the patient/family, physician, and health care team regarding progress on the discharge plan  - Arrange appropriate transportation to post-acute venues  Outcome: Progressing     Problem: Knowledge Deficit  Goal: Patient/family/caregiver demonstrates understanding of disease process, treatment plan, medications, and discharge instructions  Description: Complete learning assessment and assess knowledge base.  Interventions:  - Provide teaching at level of understanding  - Provide teaching via preferred learning methods  Outcome: Progressing     Problem: SLEEP DISTURBANCE  Goal: Will exhibit normal sleeping pattern  Description: Interventions:  -  Assess the patients sleep pattern, noting recent changes  - Administer medication as ordered  - Decrease environmental stimuli, including noise, as appropriate during the night  - Encourage the patient to actively participate in unit groups and or exercise during the day to enhance ability to achieve adequate sleep at night  - Assess the patient, in the morning, encouraging a description of sleep experience  Outcome: Progressing     Problem: Ineffective Coping  Goal: Participates in unit activities  Description: Interventions:  - Provide therapeutic environment   - Provide required programming   - Redirect inappropriate behaviors   1/25/2025 0141 by Neida Sharpe, RN  Outcome: Not Progressing  1/25/2025 0140 by Neida Sharpe, RN  Outcome: Not Progressing

## 2025-01-25 NOTE — ASSESSMENT & PLAN NOTE
"Patient continues to do well and the plan is to continue with the current treatment plan as mentioned below with no changes.    Patient continues to be pending group home placement. Otherwise no clinical significant change.  SW and pt cont to work on getting documentation to [facility] office.      Medication regimen as follows, no changes as of 1/24/2025:  Abilify 5 mg daily as mood adjunct   Zoloft 150 mg daily for depression and anxiety  Continue to encourage participation in group therapy, milieu therapy and occupational therapy.  Continue to assess for side effects of medications.  Continue collaboration with SLIM for medical co-morbidities as indicated.  Continue discussion with CM/SW to assist with obtaining collateral, disposition planning, and the implementation of patient-centered individualized plan of care.  Continue frequent safety checks and vitals per unit protocol.    Risks, benefits and possible side effects of Medications: Risks, benefits, and possible side effects of medications have previously been explained. No new medications at this time.      Legal status: 201    Disposition: to be determined, pending SOAR application for potential group home placement. OT Cognitive Evaluation completed: \"Pt would benefit from discharge to a supportive environment that can provide checks for safety and compliance with IADLs. \"   Although patient's mood has stabilized, they are currently awaiting group home placement.  Their ability to recognize safety hazards take medications and participate in IADLs are significantly impaired by their cognitive deficits compounded by their chronic mental health needs and would be at risk for significant decompensation without the structure and support of a group home setting.      No associated orders from this encounter found during lookback period of 72 hours.  "

## 2025-01-25 NOTE — NURSING NOTE
Pt is withdrawn to self and room. Able to make his needs known. Encouraged to attend groups but pt reports he plans to attend groups again beginning Monday. Medication and meal compliant. Pt denies SI/HI/AH/VH but appears very depressed. Denies any unmet needs or complaints at this time.

## 2025-01-25 NOTE — PROGRESS NOTES
"Progress Note - Behavioral Health   Name: Franco Roberson 39 y.o. male I MRN: [id number]  Unit/Bed#: -01 I Date of Admission: 5/14/2024   Date of Service: 1/25/2025 I Hospital Day: 256     Assessment & Plan  MDD (major depressive disorder), recurrent severe, without psychosis (HCC)  Patient continues to do well and the plan is to continue with the current treatment plan as mentioned below with no changes.    Patient continues to be pending group home placement. Otherwise no clinical significant change.  SW and pt cont to work on getting documentation to [facility] office.      Medication regimen as follows, no changes as of 1/24/2025:  Abilify 5 mg daily as mood adjunct   Zoloft 150 mg daily for depression and anxiety  Continue to encourage participation in group therapy, milieu therapy and occupational therapy.  Continue to assess for side effects of medications.  Continue collaboration with SLIM for medical co-morbidities as indicated.  Continue discussion with CM/SW to assist with obtaining collateral, disposition planning, and the implementation of patient-centered individualized plan of care.  Continue frequent safety checks and vitals per unit protocol.    Risks, benefits and possible side effects of Medications: Risks, benefits, and possible side effects of medications have previously been explained. No new medications at this time.      Legal status: 201    Disposition: to be determined, pending SOAR application for potential group home placement. OT Cognitive Evaluation completed: \"Pt would benefit from discharge to a supportive environment that can provide checks for safety and compliance with IADLs. \"   Although patient's mood has stabilized, they are currently awaiting group home placement.  Their ability to recognize safety hazards take medications and participate in IADLs are significantly impaired by their cognitive deficits compounded by their chronic mental health needs and would be at risk for " "significant decompensation without the structure and support of a group home setting.      No associated orders from this encounter found during lookback period of 72 hours.  Autism spectrum disorder  Continue supportive care    No associated orders from this encounter found during lookback period of 72 hours.    Hyperlipidemia  - Lipitor 40 mg     PRN medications over the past 24 hours:    None    Progress toward goals:    Pending placement      Recommended Treatment:   - Encourage group therapy, milieu therapy, and occupational therapy  - Behavioral Health checks every 15 minutes  - Medical management per SLIM    Risks, benefits and possible side effects of Medications:   Risks, benefits, and possible side effects of medications explained to patient and patient verbalizes understanding.            SUBJECTIVE:  Per staff patient has been isolative to room, not attending groups, denying all symptoms.  The patient was evaluated today for continuity of care and no acute distress noted throughout the evaluation.  The patient was evaluated in his room today, he appeared anxious but was cooperative with the evaluation.  He reports that mood is \"really good\" and he says he is recovering from a \"viral illness.\"  He denies suicidal or homicidal ideation, denied auditory or visual hallucinations.  Patient denied adverse effects from his medication and was in agreement with plan to continue current medications.  Patient discusses that he is currently working to obtain Social Security and for placement into a group home.      Psychiatric Review of Systems:  Behavior over the last 24 hours:  unchanged  Sleep: normal  Appetite: improving  Medication side effects: No   ROS: no complaints, all other systems are negative    OBJECTIVE:  Current Medications:  Current Facility-Administered Medications   Medication Dose Route Frequency Provider Last Rate    acetaminophen  650 mg Oral Q6H PRN Basilio Brown MD      acetaminophen  " 650 mg Oral Q4H PRN Basilio Brown MD      acetaminophen  975 mg Oral Q6H PRN Basilio Brown MD      aluminum-magnesium hydroxide-simethicone  30 mL Oral Q4H PRN Basilio Brown MD      ARIPiprazole  5 mg Oral Daily Basilio Panda MD      Artificial Tears  1 drop Both Eyes Q3H PRN Basilio Brown MD      atorvastatin  40 mg Oral Daily With Dinner WALLY Baptiste      benztropine  1 mg Intramuscular Q4H PRN Max 6/day Basilio Brown MD      benztropine  1 mg Oral Q4H PRN Max 6/day Basilio Brown MD      Cholecalciferol  2,500 Units Oral Daily Dustin Mcallister MD      cyanocobalamin  1,000 mcg Oral Daily WALLY Baptiste      Diclofenac Sodium  2 g Topical 4x Daily PRN WALLY Baptiste      hydrOXYzine HCL  50 mg Oral Q6H PRN Max 4/day Basilio Brown MD      Or    diphenhydrAMINE  50 mg Intramuscular Q6H PRN Basilio Brown MD      diphenhydrAMINE-zinc acetate   Topical Daily PRN Raj Guerrero MD      famotidine  20 mg Oral BID WALLY Baptiste      hydrOXYzine HCL  100 mg Oral Q6H PRN Max 4/day Basilio Brown MD      Or    LORazepam  2 mg Intramuscular Q6H PRN Basilio Brown MD      hydrOXYzine HCL  25 mg Oral Q6H PRN Max 4/day Basilio Brown MD      levothyroxine  37.5 mcg Oral Early Morning WALLY Baptiste      melatonin  6 mg Oral HS PRN WALLY Gomez      methocarbamol  500 mg Oral Q6H PRN WALLY Baptiste      OLANZapine  5 mg Oral Q4H PRN Max 3/day Basilio Brown MD      Or    OLANZapine  2.5 mg Intramuscular Q4H PRN Max 3/day Basilio Brown MD      OLANZapine  5 mg Oral Q3H PRN Max 3/day Basilio Brown MD      Or    OLANZapine  5 mg Intramuscular Q3H PRN Max 3/day Basilio Brown MD      OLANZapine  2.5 mg Oral Q4H PRN Max 6/day Basilio Brown MD      polyethylene glycol  17 g Oral Daily PRN Basilio Brown MD      propranolol  10 mg Oral  "Q8H PRN Basilio Brown MD      senna-docusate sodium  2 tablet Oral After Dinner Dustin Mcallister MD      sertraline  150 mg Oral Daily WALLY Gomez      traZODone  50 mg Oral HS PRN Basilio Brown MD         Vital signs in last 24 hours:  Temp:  [97.5 °F (36.4 °C)-98 °F (36.7 °C)] 97.5 °F (36.4 °C)  HR:  [75-82] 75  BP: (112)/(71) 112/71  Resp:  [16] 16  SpO2:  [96 %] 96 %  O2 Device: None (Room air)    Laboratory results:  I have personally reviewed all pertinent laboratory/tests results.  Most Recent Labs:   Lab Results   Component Value Date    WBC 6.75 11/12/2024    RBC 5.06 11/12/2024    HGB 15.9 11/12/2024    HCT 46.2 11/12/2024     11/12/2024    RDW 12.8 11/12/2024    NEUTROABS 4.15 11/12/2024    SODIUM 139 11/12/2024    K 4.0 11/12/2024     11/12/2024    CO2 30 11/12/2024    BUN 17 11/12/2024    CREATININE 0.99 11/12/2024    GLUC 85 11/12/2024    GLUF 85 11/12/2024    CALCIUM 9.4 11/12/2024    AST 23 11/12/2024    ALT 32 11/12/2024    ALKPHOS 73 11/12/2024    TP 8.1 11/12/2024    ALB 4.4 11/12/2024    TBILI 0.54 11/12/2024    CHOLESTEROL 203 (H) 01/23/2025    HDL 45 01/23/2025    TRIG 222 (H) 01/23/2025    LDLCALC 114 (H) 01/23/2025    NONHDLC 158 01/23/2025    [id number] 2.532 11/12/2024    FREET4 0.56 (L) 09/10/2024             Mental Status Exam:  Appearance:  casually dressed, marginal hygiene   Behavior:  cooperative, restless, responds to redirection   Speech:  normal volume, normal pitch, scant   Mood:  anxious, \"really good\"   Affect:  anxious, constricted   Thought Process:  linear, concrete   Associations: concrete associations   Thought Content:  no overt delusions, ruminating thoughts   Perceptual Disturbances: no auditory hallucinations, no visual hallucinations, does not appear responding to internal stimuli   Risk Potential: Suicidal ideation - None at present  Homicidal ideation - None at present  Potential for aggression - Not at present   Sensorium:  " oriented to person, place, and time/date   Memory:  recent and remote memory grossly intact   Consciousness:  alert and awake   Attention/Concentration: decreased concentration and decreased attention span   Insight:  limited   Judgment: limited   Gait/Station: normal gait/station, normal balance   Motor Activity: no abnormal movements         Sergio Dale DO  Attending Psychiatrist   [facility]    This note was not shared with the patient due to reasonable likelihood of causing patient harm    This note was completed in part utilizing Dragon dictation Software. Grammatical, translation, syntax errors, random word insertions, spelling mistakes, and incomplete sentences may be an occasional consequence of this system secondary to software limitations with voice recognition, ambient noise, and hardware issues. If you have any questions or concerns about the content, text, or information contained within the body of this dictation, please contact the provider for clarification.

## 2025-01-25 NOTE — NURSING NOTE
Patient secluded to room, appears to be depressed and withdrawn. Patient encouraged to partake in group and social activities. Patient denies depression and SI/HI/AH/VH at this time. Compliant with medications and routine vitals.

## 2025-01-26 VITALS
BODY MASS INDEX: 39.14 KG/M2 | RESPIRATION RATE: 16 BRPM | WEIGHT: 289 LBS | SYSTOLIC BLOOD PRESSURE: 119 MMHG | DIASTOLIC BLOOD PRESSURE: 70 MMHG | TEMPERATURE: 97.9 F | HEART RATE: 84 BPM | HEIGHT: 72 IN | OXYGEN SATURATION: 97 %

## 2025-01-26 PROCEDURE — 99232 SBSQ HOSP IP/OBS MODERATE 35: CPT | Performed by: PSYCHIATRY & NEUROLOGY

## 2025-01-26 RX ADMIN — ATORVASTATIN CALCIUM 40 MG: 40 TABLET, FILM COATED ORAL at 17:31

## 2025-01-26 RX ADMIN — SERTRALINE HYDROCHLORIDE 150 MG: 100 TABLET ORAL at 08:57

## 2025-01-26 RX ADMIN — CYANOCOBALAMIN TAB 1000 MCG 1000 MCG: 1000 TAB at 08:57

## 2025-01-26 RX ADMIN — ARIPIPRAZOLE 5 MG: 5 TABLET ORAL at 08:57

## 2025-01-26 RX ADMIN — Medication 2500 UNITS: at 08:57

## 2025-01-26 RX ADMIN — FAMOTIDINE 20 MG: 20 TABLET ORAL at 17:31

## 2025-01-26 RX ADMIN — SENNOSIDES AND DOCUSATE SODIUM 2 TABLET: 50; 8.6 TABLET ORAL at 17:31

## 2025-01-26 RX ADMIN — FAMOTIDINE 20 MG: 20 TABLET ORAL at 08:57

## 2025-01-26 RX ADMIN — LEVOTHYROXINE SODIUM 37.5 MCG: 125 TABLET ORAL at 06:22

## 2025-01-26 NOTE — NURSING NOTE
Patient was visible for some of the evening, just walking in the hallway but keeping to himself, avoiding eye contact with others. He denies depression S.I.H.I.A/H V/H

## 2025-01-26 NOTE — PLAN OF CARE
Problem: Depression  Goal: Treatment Goal: Demonstrate behavioral control of depressive symptoms, verbalize feelings of improved mood/affect, and adopt new coping skills prior to discharge  Outcome: Progressing  Goal: Verbalize thoughts and feelings  Description: Interventions:  - Assess and re-assess patient's level of risk   - Engage patient in 1:1 interactions, daily, for a minimum of 15 minutes   - Encourage patient to express feelings, fears, frustrations, hopes   Outcome: Progressing  Goal: Refrain from harming self  Description: Interventions:  - Monitor patient closely, per order   - Supervise medication ingestion, monitor effects and side effects   Outcome: Progressing  Goal: Refrain from isolation  Description: Interventions:  - Develop a trusting relationship   - Encourage socialization   Outcome: Progressing     Problem: Anxiety  Goal: Anxiety is at manageable level  Description: Interventions:  - Assess and monitor patient's anxiety level.   - Monitor for signs and symptoms (heart palpitations, chest pain, shortness of breath, headaches, nausea, feeling jumpy, restlessness, irritable, apprehensive).   - Collaborate with interdisciplinary team and initiate plan and interventions as ordered.  - [place] patient to unit/surroundings  - Explain treatment plan  - Encourage participation in care  - Encourage verbalization of concerns/fears  - Identify coping mechanisms  - Assist in developing anxiety-reducing skills  - Administer/offer alternative therapies  - Limit or eliminate stimulants  Outcome: Progressing     Problem: DISCHARGE PLANNING - CARE MANAGEMENT  Goal: Discharge to post-acute care or home with appropriate resources  Description: INTERVENTIONS:  - Conduct assessment to determine patient/family and health care team treatment goals, and need for post-acute services based on payer coverage, community resources, and patient preferences, and barriers to discharge  - Address psychosocial, clinical,  and financial barriers to discharge as identified in assessment in conjunction with the patient/family and health care team  - Arrange appropriate level of post-acute services according to patient’s   needs and preference and payer coverage in collaboration with the physician and health care team  - Communicate with and update the patient/family, physician, and health care team regarding progress on the discharge plan  - Arrange appropriate transportation to post-acute venues  Outcome: Progressing     Problem: Knowledge Deficit  Goal: Patient/family/caregiver demonstrates understanding of disease process, treatment plan, medications, and discharge instructions  Description: Complete learning assessment and assess knowledge base.  Interventions:  - Provide teaching at level of understanding  - Provide teaching via preferred learning methods  Outcome: Progressing     Problem: SLEEP DISTURBANCE  Goal: Will exhibit normal sleeping pattern  Description: Interventions:  -  Assess the patients sleep pattern, noting recent changes  - Administer medication as ordered  - Decrease environmental stimuli, including noise, as appropriate during the night  - Encourage the patient to actively participate in unit groups and or exercise during the day to enhance ability to achieve adequate sleep at night  - Assess the patient, in the morning, encouraging a description of sleep experience  Outcome: Progressing     Problem: Ineffective Coping  Goal: Participates in unit activities  Description: Interventions:  - Provide therapeutic environment   - Provide required programming   - Redirect inappropriate behaviors   Outcome: Not Progressing

## 2025-01-26 NOTE — ASSESSMENT & PLAN NOTE
"Patient continues to do well and the plan is to continue with the current treatment plan as mentioned below with no changes.    Patient continues to be pending group home placement. Otherwise no clinical significant change.  SW and pt cont to work on getting documentation to [facility] office.      Medication regimen as follows, no changes as of 1/24/2025:  Abilify 5 mg daily as mood adjunct   Zoloft 150 mg daily for depression and anxiety  Continue to encourage participation in group therapy, milieu therapy and occupational therapy.  Continue to assess for side effects of medications.  Continue collaboration with SLIM for medical co-morbidities as indicated.  Continue discussion with CM/SW to assist with obtaining collateral, disposition planning, and the implementation of patient-centered individualized plan of care.  Continue frequent safety checks and vitals per unit protocol.    Risks, benefits and possible side effects of Medications: Risks, benefits, and possible side effects of medications have previously been explained. No new medications at this time.      Legal status: 201    Disposition: to be determined, pending SOAR application for potential group home placement. OT Cognitive Evaluation completed: \"Pt would benefit from discharge to a supportive environment that can provide checks for safety and compliance with IADLs. \"   Although patient's mood has stabilized, they are currently awaiting group home placement.  Their ability to recognize safety hazards take medications and participate in IADLs are significantly impaired by their cognitive deficits compounded by their chronic mental health needs and would be at risk for significant decompensation without the structure and support of a group home setting.      No associated orders from this encounter found during lookback period of 72 hours.  "

## 2025-01-26 NOTE — PROGRESS NOTES
"Progress Note - Behavioral Health   Name: Franco Roberson 39 y.o. male I MRN: [id number]  Unit/Bed#: -01 I Date of Admission: 5/14/2024   Date of Service: 1/26/2025 I Hospital Day: 257     Assessment & Plan  MDD (major depressive disorder), recurrent severe, without psychosis (HCC)  Patient continues to do well and the plan is to continue with the current treatment plan as mentioned below with no changes.    Patient continues to be pending group home placement. Otherwise no clinical significant change.  SW and pt cont to work on getting documentation to [facility] office.      Medication regimen as follows, no changes as of 1/24/2025:  Abilify 5 mg daily as mood adjunct   Zoloft 150 mg daily for depression and anxiety  Continue to encourage participation in group therapy, milieu therapy and occupational therapy.  Continue to assess for side effects of medications.  Continue collaboration with SLIM for medical co-morbidities as indicated.  Continue discussion with CM/SW to assist with obtaining collateral, disposition planning, and the implementation of patient-centered individualized plan of care.  Continue frequent safety checks and vitals per unit protocol.    Risks, benefits and possible side effects of Medications: Risks, benefits, and possible side effects of medications have previously been explained. No new medications at this time.      Legal status: 201    Disposition: to be determined, pending SOAR application for potential group home placement. OT Cognitive Evaluation completed: \"Pt would benefit from discharge to a supportive environment that can provide checks for safety and compliance with IADLs. \"   Although patient's mood has stabilized, they are currently awaiting group home placement.  Their ability to recognize safety hazards take medications and participate in IADLs are significantly impaired by their cognitive deficits compounded by their chronic mental health needs and would be at risk for " "significant decompensation without the structure and support of a group home setting.      No associated orders from this encounter found during lookback period of 72 hours.  Autism spectrum disorder  Continue supportive care    No associated orders from this encounter found during lookback period of 72 hours.    Hyperlipidemia  - Lipitor 40 mg     PRN medications over the past 24 hours:    None    Progress toward goals:    201, Unchanged, patient is pending placement      Recommended Treatment:   - Encourage group therapy, milieu therapy, and occupational therapy  - Behavioral Health checks every 15 minutes  - Medical management per SLIM    Risks, benefits and possible side effects of Medications:   Risks, benefits, and possible side effects of medications explained to patient and patient verbalizes understanding.            SUBJECTIVE:  Per staff, no acute issues, patient has been compliant and cooperative.  The patient was evaluated today for continuity of care and no acute distress noted throughout the evaluation.  The patient was evaluated in his room today, he was pleasant and cooperative.  He states his mood is \"all right\" and he endorsed adequate sleep, appetite, and energy.  He denied suicidal or homicidal ideation.  He denied auditory or visual hallucinations.  Patient denied adverse effects from medication and was in agreement with plan to continue current medications.      Psychiatric Review of Systems:  Behavior over the last 24 hours:  unchanged  Sleep: normal  Appetite: normal  Medication side effects: No   ROS: no complaints, all other systems are negative    OBJECTIVE:  Current Medications:  Current Facility-Administered Medications   Medication Dose Route Frequency Provider Last Rate    acetaminophen  650 mg Oral Q6H PRN Basilio Brown MD      acetaminophen  650 mg Oral Q4H PRN Basilio Brown MD      acetaminophen  975 mg Oral Q6H PRN Basilio Brown MD      aluminum-magnesium " hydroxide-simethicone  30 mL Oral Q4H PRN Basilio Brown MD      ARIPiprazole  5 mg Oral Daily Basilio Panda MD      Artificial Tears  1 drop Both Eyes Q3H PRN Basilio Brown MD      atorvastatin  40 mg Oral Daily With Dinner WALLY Baptiste      benztropine  1 mg Intramuscular Q4H PRN Max 6/day Basilio Brown MD      benztropine  1 mg Oral Q4H PRN Max 6/day Basilio Brown MD      Cholecalciferol  2,500 Units Oral Daily Dustin Mcallister MD      cyanocobalamin  1,000 mcg Oral Daily WALLY Baptiste      Diclofenac Sodium  2 g Topical 4x Daily PRN WALLY Baptiste      hydrOXYzine HCL  50 mg Oral Q6H PRN Max 4/day Basilio Brown MD      Or    diphenhydrAMINE  50 mg Intramuscular Q6H PRN Basilio Brown MD      diphenhydrAMINE-zinc acetate   Topical Daily PRN Raj Guerrero MD      famotidine  20 mg Oral BID WALLY Baptiste      hydrOXYzine HCL  100 mg Oral Q6H PRN Max 4/day Basilio Brown MD      Or    LORazepam  2 mg Intramuscular Q6H PRN Basilio Brown MD      hydrOXYzine HCL  25 mg Oral Q6H PRN Max 4/day Basilio Brown MD      levothyroxine  37.5 mcg Oral Early Morning WALLY Baptiste      melatonin  6 mg Oral HS PRN WALLY Gomez      methocarbamol  500 mg Oral Q6H PRN WALLY Baptiste      OLANZapine  5 mg Oral Q4H PRN Max 3/day Basilio Brown MD      Or    OLANZapine  2.5 mg Intramuscular Q4H PRN Max 3/day Basilio Brown MD      OLANZapine  5 mg Oral Q3H PRN Max 3/day Basilio Brown MD      Or    OLANZapine  5 mg Intramuscular Q3H PRN Max 3/day Basilio Brown MD      OLANZapine  2.5 mg Oral Q4H PRN Max 6/day Basilio Brown MD      polyethylene glycol  17 g Oral Daily PRN Basilio Brown MD      propranolol  10 mg Oral Q8H PRN Basilio Brown MD      senna-docusate sodium  2 tablet Oral After Dinner Dustin Mcallister MD      sertraline  150 mg Oral  Daily WALLY Gomez      traZODone  50 mg Oral HS PRN Basilio Brown MD         Vital signs in last 24 hours:  Temp:  [97.6 °F (36.4 °C)-98 °F (36.7 °C)] 97.6 °F (36.4 °C)  HR:  [68-77] 68  BP: (116-128)/(63-70) 116/63  Resp:  [16] 16  SpO2:  [96 %-97 %] 97 %  O2 Device: None (Room air)    Laboratory results:  I have personally reviewed all pertinent laboratory/tests results.  Most Recent Labs:   Lab Results   Component Value Date    WBC 6.75 11/12/2024    RBC 5.06 11/12/2024    HGB 15.9 11/12/2024    HCT 46.2 11/12/2024     11/12/2024    RDW 12.8 11/12/2024    NEUTROABS 4.15 11/12/2024    SODIUM 139 11/12/2024    K 4.0 11/12/2024     11/12/2024    CO2 30 11/12/2024    BUN 17 11/12/2024    CREATININE 0.99 11/12/2024    GLUC 85 11/12/2024    GLUF 85 11/12/2024    CALCIUM 9.4 11/12/2024    AST 23 11/12/2024    ALT 32 11/12/2024    ALKPHOS 73 11/12/2024    TP 8.1 11/12/2024    ALB 4.4 11/12/2024    TBILI 0.54 11/12/2024    CHOLESTEROL 203 (H) 01/23/2025    HDL 45 01/23/2025    TRIG 222 (H) 01/23/2025    LDLCALC 114 (H) 01/23/2025    NONHDLC 158 01/23/2025    [id number] 2.532 11/12/2024    FREET4 0.56 (L) 09/10/2024             Mental Status Exam:  Appearance:  casually dressed, marginal hygiene   Behavior:  cooperative, responds to redirection   Speech:  normal rate, normal volume, normal pitch   Mood:  anxious   Affect:  anxious, constricted   Thought Process:  poverty of thought   Associations: concrete associations   Thought Content:  no overt delusions   Perceptual Disturbances: no auditory hallucinations, no visual hallucinations, does not appear responding to internal stimuli   Risk Potential: Suicidal ideation - None at present  Homicidal ideation - None at present  Potential for aggression - Not at present   Sensorium:  oriented to person, place, and time/date   Memory:  recent and remote memory grossly intact   Consciousness:  alert and awake   Attention/Concentration:  attention span and concentration appear shorter than expected for age   Insight:  limited   Judgment: limited   Gait/Station: normal gait/station, normal balance   Motor Activity: no abnormal movements         Sergio Dale DO  Attending Psychiatrist   [facility]    This note was not shared with the patient due to reasonable likelihood of causing patient harm    This note was completed in part utilizing Dragon dictation Software. Grammatical, translation, syntax errors, random word insertions, spelling mistakes, and incomplete sentences may be an occasional consequence of this system secondary to software limitations with voice recognition, ambient noise, and hardware issues. If you have any questions or concerns about the content, text, or information contained within the body of this dictation, please contact the provider for clarification.

## 2025-01-26 NOTE — NURSING NOTE
Pt has been withdrawn to self and room most of the day. Encouraged to attend group. Medication and meal compliant. Denies SI/HI/AH/VH. Denies any unmet needs or complaints.

## 2025-01-27 PROCEDURE — 99232 SBSQ HOSP IP/OBS MODERATE 35: CPT | Performed by: PSYCHIATRY & NEUROLOGY

## 2025-01-27 RX ADMIN — LEVOTHYROXINE SODIUM 37.5 MCG: 0.07 TABLET ORAL at 06:18

## 2025-01-27 RX ADMIN — ATORVASTATIN CALCIUM 40 MG: 40 TABLET, FILM COATED ORAL at 17:47

## 2025-01-27 RX ADMIN — CYANOCOBALAMIN TAB 1000 MCG 1000 MCG: 1000 TAB at 08:06

## 2025-01-27 RX ADMIN — ARIPIPRAZOLE 5 MG: 5 TABLET ORAL at 08:06

## 2025-01-27 RX ADMIN — SENNOSIDES AND DOCUSATE SODIUM 2 TABLET: 50; 8.6 TABLET ORAL at 17:47

## 2025-01-27 RX ADMIN — SERTRALINE HYDROCHLORIDE 150 MG: 100 TABLET ORAL at 08:06

## 2025-01-27 RX ADMIN — FAMOTIDINE 20 MG: 20 TABLET ORAL at 08:06

## 2025-01-27 RX ADMIN — FAMOTIDINE 20 MG: 20 TABLET ORAL at 17:47

## 2025-01-27 RX ADMIN — Medication 2500 UNITS: at 08:06

## 2025-01-27 NOTE — PROGRESS NOTES
"Progress Note - Behavioral Health   Name: Franco Roberson 39 y.o. male I MRN: [id number]  Unit/Bed#: -01 I Date of Admission: 5/14/2024   Date of Service: 1/27/2025 I Hospital Day: 258     Assessment & Plan  MDD (major depressive disorder), recurrent severe, without psychosis (HCC)  Patient continues to do well and the plan is to continue with the current treatment plan as mentioned below with no changes.    Patient continues to be pending group home placement. Otherwise no clinical significant change.  SW and pt cont to work on getting documentation to [facility] office.      Medication regimen as follows, no changes as of 1/27/2025:  Abilify 5 mg daily as mood adjunct   Zoloft 150 mg daily for depression and anxiety  Continue to encourage participation in group therapy, milieu therapy and occupational therapy.  Continue to assess for side effects of medications.  Continue collaboration with SLIM for medical co-morbidities as indicated.  Continue discussion with CM/SW to assist with obtaining collateral, disposition planning, and the implementation of patient-centered individualized plan of care.  Continue frequent safety checks and vitals per unit protocol.    Risks, benefits and possible side effects of Medications: Risks, benefits, and possible side effects of medications have previously been explained. No new medications at this time.      Legal status: 201    Disposition: to be determined, pending SOAR application for potential group home placement. OT Cognitive Evaluation completed: \"Pt would benefit from discharge to a supportive environment that can provide checks for safety and compliance with IADLs. \"   Although patient's mood has stabilized, they are currently awaiting group home placement.  Their ability to recognize safety hazards take medications and participate in IADLs are significantly impaired by their cognitive deficits compounded by their chronic mental health needs and would be at risk for " significant decompensation without the structure and support of a group home setting.      No associated orders from this encounter found during lookback period of 72 hours.  Autism spectrum disorder  Continue supportive care    No associated orders from this encounter found during lookback period of 72 hours.    Hyperlipidemia  - Lipitor 40 mg       Plan     Recommended Treatment:    - Encourage early mobility and having a structured day  - Provide frequent re-orientation, and cognitive stimulation  - Ensure assistive devices are in proper working order (eye-glasses, hearing aids)  - Encourage adequate hydration, nutrition and monitor bowel movements  - Maintain sleep-wake cycle: Uninterrupted sleep time; low-level lighting at night  - Fall precaution  - f/u SLIM recs regarding the medical problems   - Continue medication titration and treatment plan; adjust medication to optimize treatment response and as clinically indicated. .   - Observation: routine            - VS: as per unit protocol  - Diet: Regular diet  - Encourage group attendance and milieu therapy  - Dispo: To be determined     Scheduled medications:  Current Facility-Administered Medications   Medication Dose Route Frequency Provider Last Rate    acetaminophen  650 mg Oral Q6H PRN Basilio Brown MD      acetaminophen  650 mg Oral Q4H PRN Basilio Brown MD      acetaminophen  975 mg Oral Q6H PRN Basilio Brown MD      aluminum-magnesium hydroxide-simethicone  30 mL Oral Q4H PRN Basilio Brown MD      ARIPiprazole  5 mg Oral Daily Basilio Panda MD      Artificial Tears  1 drop Both Eyes Q3H PRN Basilio Brown MD      atorvastatin  40 mg Oral Daily With Dinner WALLY Baptiste      benztropine  1 mg Intramuscular Q4H PRN Max 6/day Basilio Brown MD      benztropine  1 mg Oral Q4H PRN Max 6/day Basilio Brown MD      Cholecalciferol  2,500 Units Oral Daily Dustin Mcallister MD      cyanocobalamin  1,000  mcg Oral Daily WALLY Baptiste      Diclofenac Sodium  2 g Topical 4x Daily PRN WALLY Baptiste      hydrOXYzine HCL  50 mg Oral Q6H PRN Max 4/day Basilio Brown MD      Or    diphenhydrAMINE  50 mg Intramuscular Q6H PRN Basilio Brown MD      diphenhydrAMINE-zinc acetate   Topical Daily PRN Raj Guerrero MD      famotidine  20 mg Oral BID WALLY Baptiste      hydrOXYzine HCL  100 mg Oral Q6H PRN Max 4/day Basilio Brown MD      Or    LORazepam  2 mg Intramuscular Q6H PRN Basilio Brown MD      hydrOXYzine HCL  25 mg Oral Q6H PRN Max 4/day Basilio Brown MD      levothyroxine  37.5 mcg Oral Early Morning WALLY Baptiste      melatonin  6 mg Oral HS PRN WALLY Gomez      methocarbamol  500 mg Oral Q6H PRN WALLY Baptiste      OLANZapine  5 mg Oral Q4H PRN Max 3/day Basilio Brown MD      Or    OLANZapine  2.5 mg Intramuscular Q4H PRN Max 3/day Basilio Brown MD      OLANZapine  5 mg Oral Q3H PRN Max 3/day Basilio Brown MD      Or    OLANZapine  5 mg Intramuscular Q3H PRN Max 3/day Basilio Brown MD      OLANZapine  2.5 mg Oral Q4H PRN Max 6/day Basilio Brown MD      polyethylene glycol  17 g Oral Daily PRN Basilio Brown MD      propranolol  10 mg Oral Q8H PRN Basilio Brown MD      senna-docusate sodium  2 tablet Oral After Dinner Dustin Mcallister MD      sertraline  150 mg Oral Daily WALLY Gomez      traZODone  50 mg Oral HS PRN Basilio Brown MD        PRN:    acetaminophen    acetaminophen    acetaminophen    aluminum-magnesium hydroxide-simethicone    Artificial Tears    benztropine    benztropine    Diclofenac Sodium    hydrOXYzine HCL **OR** diphenhydrAMINE    diphenhydrAMINE-zinc acetate    hydrOXYzine HCL **OR** LORazepam    hydrOXYzine HCL    melatonin    methocarbamol    OLANZapine **OR** OLANZapine    OLANZapine **OR** OLANZapine    OLANZapine     "polyethylene glycol    propranolol    traZODone       Subjective     Patient was visited on unit for continuing care; chart reviewed and discussed with multidisciplinary treatment team.  On approach, the patient was calm and cooperative. Denied any changes in mood, appetite, and energy level. No problem initiating and maintaining sleep.  Denied A/VH currently.  Denied SI/HI, intent or plan upon direct inquiry at this time. Pt denies having any issues over the weekend.     Patient continues to be visible in the milieu and interacts with staff and peers. No reports of aggression or self-injurious behavior on unit. No PRN medications used in the past 24 hours.    Patient accepted all offered medications and no adverse effects of medications noted or reported.    Objective    Current Mental Status Evaluation:  Mental Status Exam  Appearance: casually dressed, consistent with stated age  Motor: no psychomotor retardation, no gait abnormalities  Behavior: cooperative, answers questions appropriately  Speech: soft, normal rhythm  Mood: \"good\"  Affect: blunted  Thought Process: linear and goal-oriented  Thought Content: denies delusions  Risk Potential: denies suicidal ideation, plan, or intent. Denies homicidal ideation  Perceptions: denies auditory hallucinations, denies visual hallucinations,   Sensorium: Oriented to person, place, time, and situation  Cognition: cognitive ability appears intact but was not quantitatively tested  Consciousness: alert and awake  Attention: intact, able to focus without difficulty  Insight: limited  Judgement: limited            Vital signs in last 24 hours:    Temp:  [97.2 °F (36.2 °C)-97.9 °F (36.6 °C)] 97.2 °F (36.2 °C)  HR:  [64-84] 64  BP: (115-119)/(70-79) 115/79  Resp:  [16] 16  SpO2:  [97 %-99 %] 99 %  O2 Device: None (Room air)    Psychiatric Review of Systems:  Medication adverse effects: none  Sleep: unchanged  Appetite: unchanged  Behaviors over the past 24 hours: " unchanged    Laboratory results:    I have personally reviewed all pertinent laboratory/tests results  No results found for this or any previous visit (from the past 48 hours).       Progress Toward Goals & Illness Status:   mood is stabilizing, placement pending    Patient is not at goal. They are not yet ready for discharge. The patient's condition currently requires active psychopharmacological medication management, interdisciplinary coordination with case management, and the utilization of adjunctive milieu and group therapy to augment psychopharmacological efficacy. The patient's risk of morbidity, and progression or decompensation of psychiatric disease, is higher without this current treatment.     Next of Kin  Extended Emergency Contact Information  Primary Emergency Contact: Lois Roberson  Address: [address]           [place] [id number] [place] [address] [place]  Home Phone: [phone]  Relation: Mother    Counseling / Coordination of Care  Patient's progress discussed with staff in treatment team meeting.  Medications, treatment progress and treatment plan reviewed with patient.  Medication education provided to patient.  Educated on importance of medication and treatment compliance.  Supportive therapy provided to patient.  Cognitive techniques utilized during the session.  Reassurance and supportive therapy provided.  Reoriented to reality and reassured.  Encouraged participation in milieu and group therapy on the unit.  Crisis/safety plan discussed with patient.       Dustin Mcallister MD  Attending Psychiatrist   [facility]      This note has been constructed using a voice recognition system. There may be translation, syntax, or grammatical errors. If you have any questions, please contact the dictating provider.

## 2025-01-27 NOTE — PLAN OF CARE
Problem: Ineffective Coping  Goal: Participates in unit activities  Description: Interventions:  - Provide therapeutic environment   - Provide required programming   - Redirect inappropriate behaviors   Outcome: Progressing     Problem: Depression  Goal: Treatment Goal: Demonstrate behavioral control of depressive symptoms, verbalize feelings of improved mood/affect, and adopt new coping skills prior to discharge  Outcome: Progressing  Goal: Verbalize thoughts and feelings  Description: Interventions:  - Assess and re-assess patient's level of risk   - Engage patient in 1:1 interactions, daily, for a minimum of 15 minutes   - Encourage patient to express feelings, fears, frustrations, hopes   Outcome: Progressing  Goal: Refrain from harming self  Description: Interventions:  - Monitor patient closely, per order   - Supervise medication ingestion, monitor effects and side effects   Outcome: Progressing  Goal: Refrain from isolation  Description: Interventions:  - Develop a trusting relationship   - Encourage socialization   Outcome: Progressing     Problem: Anxiety  Goal: Anxiety is at manageable level  Description: Interventions:  - Assess and monitor patient's anxiety level.   - Monitor for signs and symptoms (heart palpitations, chest pain, shortness of breath, headaches, nausea, feeling jumpy, restlessness, irritable, apprehensive).   - Collaborate with interdisciplinary team and initiate plan and interventions as ordered.  - [place] patient to unit/surroundings  - Explain treatment plan  - Encourage participation in care  - Encourage verbalization of concerns/fears  - Identify coping mechanisms  - Assist in developing anxiety-reducing skills  - Administer/offer alternative therapies  - Limit or eliminate stimulants  Outcome: Progressing     Problem: DISCHARGE PLANNING - CARE MANAGEMENT  Goal: Discharge to post-acute care or home with appropriate resources  Description: INTERVENTIONS:  - Conduct assessment to  determine patient/family and health care team treatment goals, and need for post-acute services based on payer coverage, community resources, and patient preferences, and barriers to discharge  - Address psychosocial, clinical, and financial barriers to discharge as identified in assessment in conjunction with the patient/family and health care team  - Arrange appropriate level of post-acute services according to patient’s   needs and preference and payer coverage in collaboration with the physician and health care team  - Communicate with and update the patient/family, physician, and health care team regarding progress on the discharge plan  - Arrange appropriate transportation to post-acute venues  Outcome: Progressing     Problem: Knowledge Deficit  Goal: Patient/family/caregiver demonstrates understanding of disease process, treatment plan, medications, and discharge instructions  Description: Complete learning assessment and assess knowledge base.  Interventions:  - Provide teaching at level of understanding  - Provide teaching via preferred learning methods  Outcome: Progressing     Problem: SLEEP DISTURBANCE  Goal: Will exhibit normal sleeping pattern  Description: Interventions:  -  Assess the patients sleep pattern, noting recent changes  - Administer medication as ordered  - Decrease environmental stimuli, including noise, as appropriate during the night  - Encourage the patient to actively participate in unit groups and or exercise during the day to enhance ability to achieve adequate sleep at night  - Assess the patient, in the morning, encouraging a description of sleep experience  Outcome: Progressing

## 2025-01-27 NOTE — PLAN OF CARE
Pt has had a decrease in group attendance, but still sporadically attends groups here and there. Pt typically quiet but participates appropriately when prompted.

## 2025-01-27 NOTE — ASSESSMENT & PLAN NOTE
"Patient continues to do well and the plan is to continue with the current treatment plan as mentioned below with no changes.    Patient continues to be pending group home placement. Otherwise no clinical significant change.  SW and pt cont to work on getting documentation to [facility] office.      Medication regimen as follows, no changes as of 1/27/2025:  Abilify 5 mg daily as mood adjunct   Zoloft 150 mg daily for depression and anxiety  Continue to encourage participation in group therapy, milieu therapy and occupational therapy.  Continue to assess for side effects of medications.  Continue collaboration with SLIM for medical co-morbidities as indicated.  Continue discussion with CM/SW to assist with obtaining collateral, disposition planning, and the implementation of patient-centered individualized plan of care.  Continue frequent safety checks and vitals per unit protocol.    Risks, benefits and possible side effects of Medications: Risks, benefits, and possible side effects of medications have previously been explained. No new medications at this time.      Legal status: 201    Disposition: to be determined, pending SOAR application for potential group home placement. OT Cognitive Evaluation completed: \"Pt would benefit from discharge to a supportive environment that can provide checks for safety and compliance with IADLs. \"   Although patient's mood has stabilized, they are currently awaiting group home placement.  Their ability to recognize safety hazards take medications and participate in IADLs are significantly impaired by their cognitive deficits compounded by their chronic mental health needs and would be at risk for significant decompensation without the structure and support of a group home setting.      No associated orders from this encounter found during lookback period of 72 hours.  "

## 2025-01-27 NOTE — NURSING NOTE
Pt out in milieu this evening, social with select peers. Denies SI/HI/AVH at this time. No HS medications scheduled. Pt pleasant in conversation, brightens upon approach. Q15 minute checks maintained for safety.

## 2025-01-27 NOTE — NURSING NOTE
Pt is calm and cooperative. Visible in the milieu, isolative to room and self. Depressed affect noted. Denies SI, HI, AH, VH, and pain. Compliant with medications and meals. Denies unmet needs at this time.

## 2025-01-27 NOTE — NURSING NOTE
Seclusive to his room. Cooperative and pleasant during interaction. Compliant with medications. Waiting placement.

## 2025-01-28 PROCEDURE — 99232 SBSQ HOSP IP/OBS MODERATE 35: CPT | Performed by: PSYCHIATRY & NEUROLOGY

## 2025-01-28 RX ADMIN — ARIPIPRAZOLE 5 MG: 5 TABLET ORAL at 08:20

## 2025-01-28 RX ADMIN — SERTRALINE HYDROCHLORIDE 150 MG: 100 TABLET ORAL at 08:20

## 2025-01-28 RX ADMIN — ATORVASTATIN CALCIUM 40 MG: 40 TABLET, FILM COATED ORAL at 17:21

## 2025-01-28 RX ADMIN — LEVOTHYROXINE SODIUM 37.5 MCG: 0.07 TABLET ORAL at 06:43

## 2025-01-28 RX ADMIN — CYANOCOBALAMIN TAB 1000 MCG 1000 MCG: 1000 TAB at 08:20

## 2025-01-28 RX ADMIN — FAMOTIDINE 20 MG: 20 TABLET ORAL at 08:20

## 2025-01-28 RX ADMIN — Medication 2500 UNITS: at 08:20

## 2025-01-28 RX ADMIN — FAMOTIDINE 20 MG: 20 TABLET ORAL at 17:16

## 2025-01-28 RX ADMIN — SENNOSIDES AND DOCUSATE SODIUM 2 TABLET: 50; 8.6 TABLET ORAL at 17:16

## 2025-01-28 NOTE — PLAN OF CARE
Problem: Depression  Goal: Treatment Goal: Demonstrate behavioral control of depressive symptoms, verbalize feelings of improved mood/affect, and adopt new coping skills prior to discharge  Outcome: Progressing  Goal: Verbalize thoughts and feelings  Description: Interventions:  - Assess and re-assess patient's level of risk   - Engage patient in 1:1 interactions, daily, for a minimum of 15 minutes   - Encourage patient to express feelings, fears, frustrations, hopes   Outcome: Progressing  Goal: Refrain from harming self  Description: Interventions:  - Monitor patient closely, per order   - Supervise medication ingestion, monitor effects and side effects   Outcome: Progressing  Goal: Refrain from isolation  Description: Interventions:  - Develop a trusting relationship   - Encourage socialization   Outcome: Progressing     Problem: Anxiety  Goal: Anxiety is at manageable level  Description: Interventions:  - Assess and monitor patient's anxiety level.   - Monitor for signs and symptoms (heart palpitations, chest pain, shortness of breath, headaches, nausea, feeling jumpy, restlessness, irritable, apprehensive).   - Collaborate with interdisciplinary team and initiate plan and interventions as ordered.  - [place] patient to unit/surroundings  - Explain treatment plan  - Encourage participation in care  - Encourage verbalization of concerns/fears  - Identify coping mechanisms  - Assist in developing anxiety-reducing skills  - Administer/offer alternative therapies  - Limit or eliminate stimulants  Outcome: Progressing     Problem: DISCHARGE PLANNING - CARE MANAGEMENT  Goal: Discharge to post-acute care or home with appropriate resources  Description: INTERVENTIONS:  - Conduct assessment to determine patient/family and health care team treatment goals, and need for post-acute services based on payer coverage, community resources, and patient preferences, and barriers to discharge  - Address psychosocial, clinical,  and financial barriers to discharge as identified in assessment in conjunction with the patient/family and health care team  - Arrange appropriate level of post-acute services according to patient’s   needs and preference and payer coverage in collaboration with the physician and health care team  - Communicate with and update the patient/family, physician, and health care team regarding progress on the discharge plan  - Arrange appropriate transportation to post-acute venues  Outcome: Progressing     Problem: Knowledge Deficit  Goal: Patient/family/caregiver demonstrates understanding of disease process, treatment plan, medications, and discharge instructions  Description: Complete learning assessment and assess knowledge base.  Interventions:  - Provide teaching at level of understanding  - Provide teaching via preferred learning methods  Outcome: Progressing     Problem: SLEEP DISTURBANCE  Goal: Will exhibit normal sleeping pattern  Description: Interventions:  -  Assess the patients sleep pattern, noting recent changes  - Administer medication as ordered  - Decrease environmental stimuli, including noise, as appropriate during the night  - Encourage the patient to actively participate in unit groups and or exercise during the day to enhance ability to achieve adequate sleep at night  - Assess the patient, in the morning, encouraging a description of sleep experience  Outcome: Progressing

## 2025-01-28 NOTE — ASSESSMENT & PLAN NOTE
"Patient continues to do well and the plan is to continue with the current treatment plan as mentioned below with no changes.    Patient continues to be pending group home placement. Otherwise no clinical significant change.  SW and pt cont to work on getting documentation to [facility] office and waiting for follow up from them.    Medication regimen as follows, no changes as of 1/28/2025:  Abilify 5 mg daily as mood adjunct   Zoloft 150 mg daily for depression and anxiety  Continue to encourage participation in group therapy, milieu therapy and occupational therapy.  Continue to assess for side effects of medications.  Continue collaboration with [facility] for medical co-morbidities as indicated.  Continue discussion with CM/SW to assist with obtaining collateral, disposition planning, and the implementation of patient-centered individualized plan of care.  Continue frequent safety checks and vitals per unit protocol.    Risks, benefits and possible side effects of Medications: Risks, benefits, and possible side effects of medications have previously been explained. No new medications at this time.      Legal status: 201    Disposition: to be determined, pending [facility] application for potential group home placement. OT Cognitive Evaluation completed: \"Pt would benefit from discharge to a supportive environment that can provide checks for safety and compliance with IADLs. \"   Although patient's mood has stabilized, they are currently awaiting group home placement.  Their ability to recognize safety hazards take medications and participate in IADLs are significantly impaired by their cognitive deficits compounded by their chronic mental health needs and would be at risk for significant decompensation without the structure and support of a group home setting.      No associated orders from this encounter found during lookback period of 72 hours.  "

## 2025-01-28 NOTE — PROGRESS NOTES
"Progress Note - Behavioral Health   Name: Franco Roberson 39 y.o. male I MRN: [id number]  Unit/Bed#: -01 I Date of Admission: 5/14/2024   Date of Service: 1/28/2025 I Hospital Day: 259     Assessment & Plan  MDD (major depressive disorder), recurrent severe, without psychosis (HCC)  Patient continues to do well and the plan is to continue with the current treatment plan as mentioned below with no changes.    Patient continues to be pending group home placement. Otherwise no clinical significant change.  SW and pt cont to work on getting documentation to [facility] office and waiting for follow up from them.    Medication regimen as follows, no changes as of 1/28/2025:  Abilify 5 mg daily as mood adjunct   Zoloft 150 mg daily for depression and anxiety  Continue to encourage participation in group therapy, milieu therapy and occupational therapy.  Continue to assess for side effects of medications.  Continue collaboration with PATRICIA for medical co-morbidities as indicated.  Continue discussion with CM/SW to assist with obtaining collateral, disposition planning, and the implementation of patient-centered individualized plan of care.  Continue frequent safety checks and vitals per unit protocol.    Risks, benefits and possible side effects of Medications: Risks, benefits, and possible side effects of medications have previously been explained. No new medications at this time.      Legal status: 201    Disposition: to be determined, pending [facility]AR application for potential group home placement. OT Cognitive Evaluation completed: \"Pt would benefit from discharge to a supportive environment that can provide checks for safety and compliance with IADLs. \"   Although patient's mood has stabilized, they are currently awaiting group home placement.  Their ability to recognize safety hazards take medications and participate in IADLs are significantly impaired by their cognitive deficits compounded by their chronic mental health needs " and would be at risk for significant decompensation without the structure and support of a group home setting.      No associated orders from this encounter found during lookback period of 72 hours.  Autism spectrum disorder  Continue supportive care    No associated orders from this encounter found during lookback period of 72 hours.    Hyperlipidemia  - Lipitor 40 mg       Plan     Recommended Treatment:    - Encourage early mobility and having a structured day  - Provide frequent re-orientation, and cognitive stimulation  - Ensure assistive devices are in proper working order (eye-glasses, hearing aids)  - Encourage adequate hydration, nutrition and monitor bowel movements  - Maintain sleep-wake cycle: Uninterrupted sleep time; low-level lighting at night  - Fall precaution  - f/u [facility] recs regarding the medical problems   - Continue medication titration and treatment plan; adjust medication to optimize treatment response and as clinically indicated. .   - Observation: routine            - VS: as per unit protocol  - Diet: Regular diet  - Encourage group attendance and milieu therapy  - Dispo: To be determined     Scheduled medications:  Current Facility-Administered Medications   Medication Dose Route Frequency Provider Last Rate    acetaminophen  650 mg Oral Q6H PRN Basilio Brown MD      acetaminophen  650 mg Oral Q4H PRN Basilio Brown MD      acetaminophen  975 mg Oral Q6H PRN Basilio Brown MD      aluminum-magnesium hydroxide-simethicone  30 mL Oral Q4H PRN Basilio Brown MD      ARIPiprazole  5 mg Oral Daily Basilio Panda MD      Artificial Tears  1 drop Both Eyes Q3H PRN Basilio Brown MD      atorvastatin  40 mg Oral Daily With Dinner WALLY Baptiste      benztropine  1 mg Intramuscular Q4H PRN Max 6/day Basilio Brown MD      benztropine  1 mg Oral Q4H PRN Max 6/day Basilio Brown MD      Cholecalciferol  2,500 Units Oral Daily Dustin Mcallister MD       cyanocobalamin  1,000 mcg Oral Daily WALLY Baptiste      Diclofenac Sodium  2 g Topical 4x Daily PRN WALLY Baptiste      hydrOXYzine HCL  50 mg Oral Q6H PRN Max 4/day Basilio Brown MD      Or    diphenhydrAMINE  50 mg Intramuscular Q6H PRN Basilio Brown MD      diphenhydrAMINE-zinc acetate   Topical Daily PRN Raj Guerrero MD      famotidine  20 mg Oral BID LauraWALLY Tony      hydrOXYzine HCL  100 mg Oral Q6H PRN Max 4/day Basilio Brown MD      Or    LORazepam  2 mg Intramuscular Q6H PRN Basilio Brown MD      hydrOXYzine HCL  25 mg Oral Q6H PRN Max 4/day Basilio Brown MD      levothyroxine  37.5 mcg Oral Early Morning WALLY Baptiste      melatonin  6 mg Oral HS PRN WALLY Gomez      methocarbamol  500 mg Oral Q6H PRN WALLY Baptiste      OLANZapine  5 mg Oral Q4H PRN Max 3/day Basilio Brown MD      Or    OLANZapine  2.5 mg Intramuscular Q4H PRN Max 3/day Basilio Brown MD      OLANZapine  5 mg Oral Q3H PRN Max 3/day Basilio Brown MD      Or    OLANZapine  5 mg Intramuscular Q3H PRN Max 3/day Basilio Brown MD      OLANZapine  2.5 mg Oral Q4H PRN Max 6/day Basilio Brown MD      polyethylene glycol  17 g Oral Daily PRN Basilio Brown MD      propranolol  10 mg Oral Q8H PRN Basilio Brown MD      senna-docusate sodium  2 tablet Oral After Dinner Dustin Mcallister MD      sertraline  150 mg Oral Daily WALLY Gomez      traZODone  50 mg Oral HS PRN Basilio Brown MD        PRN:    acetaminophen    acetaminophen    acetaminophen    aluminum-magnesium hydroxide-simethicone    Artificial Tears    benztropine    benztropine    Diclofenac Sodium    hydrOXYzine HCL **OR** diphenhydrAMINE    diphenhydrAMINE-zinc acetate    hydrOXYzine HCL **OR** LORazepam    hydrOXYzine HCL    melatonin    methocarbamol    OLANZapine **OR** OLANZapine    OLANZapine **OR**  "OLANZapine    OLANZapine    polyethylene glycol    propranolol    traZODone       Subjective     Patient was visited on unit for continuing care; chart reviewed and discussed with multidisciplinary treatment team.  On approach, the patient was calm and cooperative. Denied any changes in mood, appetite, and energy level. No problem initiating and maintaining sleep.  Denied A/VH currently.  Denied SI/HI, intent or plan upon direct inquiry at this time. No acute physical complaints or concerns. Tolerating increase of liptor well.     Patient continues to be visible in the milieu and interacts with staff and peers. No reports of aggression or self-injurious behavior on unit. No PRN medications used in the past 24 hours.    Patient accepted all offered medications and no adverse effects of medications noted or reported.    Objective    Current Mental Status Evaluation:  Appearance: casually dressed, consistent with stated age  Motor: no psychomotor retardation, no gait abnormalities  Behavior: cooperative, answers questions appropriately  Speech: soft, normal rhythm  Mood: \"good\"  Affect: constricted  Thought Process: linear and goal-oriented  Thought Content: denies delusions or paranoia  Risk Potential: denies suicidal ideation, plan, or intent. Denies homicidal ideation  Perceptions: denies auditory hallucinations, denies visual hallucinations,   Sensorium: Oriented to person, place, time, and situation  Cognition: cognitive ability appears intact but was not quantitatively tested  Consciousness: alert and awake  Attention: intact, able to focus without difficulty  Insight: limited  Judgement: limited            Vital signs in last 24 hours:    Temp:  [96.5 °F (35.8 °C)-97.6 °F (36.4 °C)] 96.5 °F (35.8 °C)  HR:  [71-85] 71  BP: (111-114)/(58-60) 114/58  Resp:  [16] 16  SpO2:  [96 %-98 %] 98 %  O2 Device: None (Room air)    Psychiatric Review of Systems:  Medication adverse effects: none  Sleep: unchanged  Appetite: " unchanged  Behaviors over the past 24 hours: unchanged    Laboratory results:    I have personally reviewed all pertinent laboratory/tests results  No results found for this or any previous visit (from the past 48 hours).       Progress Toward Goals & Illness Status:   progressing, attends groups, placement pending    Patient is not at goal. They are not yet ready for discharge. The patient's condition currently requires active psychopharmacological medication management, interdisciplinary coordination with case management, and the utilization of adjunctive milieu and group therapy to augment psychopharmacological efficacy. The patient's risk of morbidity, and progression or decompensation of psychiatric disease, is higher without this current treatment.     Next of Kin  Extended Emergency Contact Information  Primary Emergency Contact: Lois Roberson  Address: [address] [place] [address]  Home Phone: [phone]  Relation: Mother    Counseling / Coordination of Care  Patient's progress discussed with staff in treatment team meeting.  Medications, treatment progress and treatment plan reviewed with patient.  Medication education provided to patient.  Educated on importance of medication and treatment compliance.  Supportive therapy provided to patient.  Cognitive techniques utilized during the session.  Reassurance and supportive therapy provided.  Reoriented to reality and reassured.  Encouraged participation in milieu and group therapy on the unit.  Crisis/safety plan discussed with patient.       Dustin Mcallister MD  Attending Psychiatrist   [facility]      This note has been constructed using a voice recognition system. There may be translation, syntax, or grammatical errors. If you have any questions, please contact the dictating provider.

## 2025-01-28 NOTE — SOCIAL WORK
Cm met with Pt. Cm and Pt called [facility] to request bank statements. Pt was able to sign onto online banking. Pt discovered several fraudulent charges to his account. The charges appeared from 12/17-1/27, as online charged from [employer] and [employer] etc. Pt called [facility] and disputed that charges.   Cm and Pt requested proof that these purchases are being disputed due to having to prove to [facility] that they are not purchases Pt has made.

## 2025-01-29 PROCEDURE — 99232 SBSQ HOSP IP/OBS MODERATE 35: CPT | Performed by: PSYCHIATRY & NEUROLOGY

## 2025-01-29 RX ADMIN — FAMOTIDINE 20 MG: 20 TABLET ORAL at 08:05

## 2025-01-29 RX ADMIN — Medication 2500 UNITS: at 08:06

## 2025-01-29 RX ADMIN — FAMOTIDINE 20 MG: 20 TABLET ORAL at 17:30

## 2025-01-29 RX ADMIN — SENNOSIDES AND DOCUSATE SODIUM 2 TABLET: 50; 8.6 TABLET ORAL at 17:29

## 2025-01-29 RX ADMIN — CYANOCOBALAMIN TAB 1000 MCG 1000 MCG: 1000 TAB at 08:05

## 2025-01-29 RX ADMIN — ATORVASTATIN CALCIUM 40 MG: 40 TABLET, FILM COATED ORAL at 17:30

## 2025-01-29 RX ADMIN — ARIPIPRAZOLE 5 MG: 5 TABLET ORAL at 08:05

## 2025-01-29 RX ADMIN — SERTRALINE HYDROCHLORIDE 150 MG: 100 TABLET ORAL at 08:05

## 2025-01-29 RX ADMIN — LEVOTHYROXINE SODIUM 37.5 MCG: 0.07 TABLET ORAL at 06:36

## 2025-01-29 NOTE — NURSING NOTE
Pt denies SI, HI, AH and VH. Pt is calm and cooperative. Pt is mainly isolative to self and room with brief intervals in the milieu for meals and needs. Pt has no complaints or concerns. Medication and meal compliant.

## 2025-01-29 NOTE — ASSESSMENT & PLAN NOTE
"Patient continues to do well and the plan is to continue with the current treatment plan as mentioned below with no changes.    Patient continues to be pending group home placement. Otherwise no clinical significant change.  SW and pt cont to work on getting documentation to [facility] office and waiting for follow up from them.    Medication regimen as follows, no changes as of 1/29/2025:  Abilify 5 mg daily as mood adjunct   Zoloft 150 mg daily for depression and anxiety  Continue to encourage participation in group therapy, milieu therapy and occupational therapy.  Continue to assess for side effects of medications.  Continue collaboration with [facility] for medical co-morbidities as indicated.  Continue discussion with CM/SW to assist with obtaining collateral, disposition planning, and the implementation of patient-centered individualized plan of care.  Continue frequent safety checks and vitals per unit protocol.    Risks, benefits and possible side effects of Medications: Risks, benefits, and possible side effects of medications have previously been explained. No new medications at this time.      Legal status: 201    Disposition: to be determined, pending [facility] application for potential group home placement. OT Cognitive Evaluation completed: \"Pt would benefit from discharge to a supportive environment that can provide checks for safety and compliance with IADLs. \"   Although patient's mood has stabilized, they are currently awaiting group home placement.  Their ability to recognize safety hazards take medications and participate in IADLs are significantly impaired by their cognitive deficits compounded by their chronic mental health needs and would be at risk for significant decompensation without the structure and support of a group home setting.      No associated orders from this encounter found during lookback period of 72 hours.  "

## 2025-01-29 NOTE — PLAN OF CARE
Pt intermittently attending groups, typically quiet, but participates appropriately when prompted.

## 2025-01-29 NOTE — PROGRESS NOTES
"Progress Note - Behavioral Health   Name: Franco Roberson 39 y.o. male I MRN: [id number]  Unit/Bed#: -01 I Date of Admission: 5/14/2024   Date of Service: 1/29/2025 I Hospital Day: 260     Assessment & Plan  MDD (major depressive disorder), recurrent severe, without psychosis (HCC)  Patient continues to do well and the plan is to continue with the current treatment plan as mentioned below with no changes.    Patient continues to be pending group home placement. Otherwise no clinical significant change.  SW and pt cont to work on getting documentation to [facility] office and waiting for follow up from them.    Medication regimen as follows, no changes as of 1/29/2025:  Abilify 5 mg daily as mood adjunct   Zoloft 150 mg daily for depression and anxiety  Continue to encourage participation in group therapy, milieu therapy and occupational therapy.  Continue to assess for side effects of medications.  Continue collaboration with PATRICIA for medical co-morbidities as indicated.  Continue discussion with CM/SW to assist with obtaining collateral, disposition planning, and the implementation of patient-centered individualized plan of care.  Continue frequent safety checks and vitals per unit protocol.    Risks, benefits and possible side effects of Medications: Risks, benefits, and possible side effects of medications have previously been explained. No new medications at this time.      Legal status: 201    Disposition: to be determined, pending SOAR application for potential group home placement. OT Cognitive Evaluation completed: \"Pt would benefit from discharge to a supportive environment that can provide checks for safety and compliance with IADLs. \"   Although patient's mood has stabilized, they are currently awaiting group home placement.  Their ability to recognize safety hazards take medications and participate in IADLs are significantly impaired by their cognitive deficits compounded by their chronic mental health needs " and would be at risk for significant decompensation without the structure and support of a group home setting.      No associated orders from this encounter found during lookback period of 72 hours.  Autism spectrum disorder  Continue supportive care    No associated orders from this encounter found during lookback period of 72 hours.    Hyperlipidemia  - Lipitor 40 mg       Plan     Recommended Treatment:    - Encourage early mobility and having a structured day  - Provide frequent re-orientation, and cognitive stimulation  - Ensure assistive devices are in proper working order (eye-glasses, hearing aids)  - Encourage adequate hydration, nutrition and monitor bowel movements  - Maintain sleep-wake cycle: Uninterrupted sleep time; low-level lighting at night  - Fall precaution  - f/u [facility] recs regarding the medical problems   - Continue medication titration and treatment plan; adjust medication to optimize treatment response and as clinically indicated. .   - Observation: routine            - VS: as per unit protocol  - Diet: Regular diet  - Encourage group attendance and milieu therapy  - Dispo: To be determined     Scheduled medications:  Current Facility-Administered Medications   Medication Dose Route Frequency Provider Last Rate    acetaminophen  650 mg Oral Q6H PRN Basilio Brown MD      acetaminophen  650 mg Oral Q4H PRN Basilio Brown MD      acetaminophen  975 mg Oral Q6H PRN Basilio Brown MD      aluminum-magnesium hydroxide-simethicone  30 mL Oral Q4H PRN Basilio Brown MD      ARIPiprazole  5 mg Oral Daily Basilio Panda MD      Artificial Tears  1 drop Both Eyes Q3H PRN Basilio Brown MD      atorvastatin  40 mg Oral Daily With Dinner WALLY Baptiste      benztropine  1 mg Intramuscular Q4H PRN Max 6/day Basilio Brown MD      benztropine  1 mg Oral Q4H PRN Max 6/day Basilio Brown MD      Cholecalciferol  2,500 Units Oral Daily Dustin Mcallister MD       cyanocobalamin  1,000 mcg Oral Daily WALLY Baptiste      Diclofenac Sodium  2 g Topical 4x Daily PRN WALLY Baptiste      hydrOXYzine HCL  50 mg Oral Q6H PRN Max 4/day Basilio Brown MD      Or    diphenhydrAMINE  50 mg Intramuscular Q6H PRN Basilio Brown MD      diphenhydrAMINE-zinc acetate   Topical Daily PRN Raj Guerrero MD      famotidine  20 mg Oral BID LauraWALLY Tony      hydrOXYzine HCL  100 mg Oral Q6H PRN Max 4/day Basilio Brown MD      Or    LORazepam  2 mg Intramuscular Q6H PRN Basilio Brown MD      hydrOXYzine HCL  25 mg Oral Q6H PRN Max 4/day Basilio Brown MD      levothyroxine  37.5 mcg Oral Early Morning WALLY Baptiste      melatonin  6 mg Oral HS PRN WALLY Gomez      methocarbamol  500 mg Oral Q6H PRN WALLY Baptiste      OLANZapine  5 mg Oral Q4H PRN Max 3/day Basilio Brown MD      Or    OLANZapine  2.5 mg Intramuscular Q4H PRN Max 3/day Basilio Brown MD      OLANZapine  5 mg Oral Q3H PRN Max 3/day Basilio Brown MD      Or    OLANZapine  5 mg Intramuscular Q3H PRN Max 3/day Basilio Brown MD      OLANZapine  2.5 mg Oral Q4H PRN Max 6/day Basilio Brown MD      polyethylene glycol  17 g Oral Daily PRN Basilio Brown MD      propranolol  10 mg Oral Q8H PRN Basilio Brown MD      senna-docusate sodium  2 tablet Oral After Dinner Dustin Mcallister MD      sertraline  150 mg Oral Daily WALLY Gomez      traZODone  50 mg Oral HS PRN Basilio Brown MD        PRN:    acetaminophen    acetaminophen    acetaminophen    aluminum-magnesium hydroxide-simethicone    Artificial Tears    benztropine    benztropine    Diclofenac Sodium    hydrOXYzine HCL **OR** diphenhydrAMINE    diphenhydrAMINE-zinc acetate    hydrOXYzine HCL **OR** LORazepam    hydrOXYzine HCL    melatonin    methocarbamol    OLANZapine **OR** OLANZapine    OLANZapine **OR**  "OLANZapine    OLANZapine    polyethylene glycol    propranolol    traZODone       Subjective     Patient was visited on unit for continuing care; chart reviewed and discussed with multidisciplinary treatment team.  On approach, the patient was calm and cooperative. Denied any changes in mood, appetite, and energy level. No problem initiating and maintaining sleep.  Denied A/VH currently.  Denied SI/HI, intent or plan upon direct inquiry at this time.  As per social work they are working with [facility] to get his bank statements submitted to the Social Security.  Also disputing apparent fraud charges on patient's account that will also need to be reported to Social Security before that process can advance.    Patient continues to be visible in the milieu and interacts with staff and peers. No reports of aggression or self-injurious behavior on unit. No PRN medications used in the past 24 hours.    Patient accepted all offered medications and no adverse effects of medications noted or reported.    Objective    Current Mental Status Evaluation:  Appearance: casually dressed, consistent with stated age  Motor: no psychomotor retardation, no gait abnormalities  Behavior: cooperative, answers questions appropriately  Speech: soft, normal rhythm  Mood: \"good\"  Affect: blunted  Thought Process: linear and goal-oriented  Thought Content: denies delusions  Risk Potential: denies suicidal ideation, plan, or intent. Denies homicidal ideation  Perceptions: denies auditory hallucinations, denies visual hallucinations,   Sensorium: Oriented to person, place, time, and situation  Cognition: cognitive ability appears intact but was not quantitatively tested  Consciousness: alert and awake  Attention: intact, able to focus without difficulty  Insight: limited  Judgement: limited            Vital signs in last 24 hours:    Temp:  [97.2 °F (36.2 °C)-98 °F (36.7 °C)] 98 °F (36.7 °C)  HR:  [80-83] 83  BP: (118-126)/(67-78) 118/78  Resp:  [17] " 17  SpO2:  [95 %] 95 %  O2 Device: None (Room air)    Psychiatric Review of Systems:  Medication adverse effects: none  Sleep: unchanged  Appetite: unchanged  Behaviors over the past 24 hours: unchanged    Laboratory results:    I have personally reviewed all pertinent laboratory/tests results  No results found for this or any previous visit (from the past 48 hours).       Progress Toward Goals & Illness Status:   progressing, mood is stabilizing, discharge planning    Patient is not at goal. They are not yet ready for discharge. The patient's condition currently requires active psychopharmacological medication management, interdisciplinary coordination with case management, and the utilization of adjunctive milieu and group therapy to augment psychopharmacological efficacy. The patient's risk of morbidity, and progression or decompensation of psychiatric disease, is higher without this current treatment.     Next of Kin  Extended Emergency Contact Information  Primary Emergency Contact: Lois Roberson  Address: [address] [place] [address] AN[place]A           [place] [id number] [place] [address] of Adelaida  Home Phone: [phone]  Relation: Mother    Counseling / Coordination of Care  Patient's progress discussed with staff in treatment team meeting.  Medications, treatment progress and treatment plan reviewed with patient.  Medication education provided to patient.  Educated on importance of medication and treatment compliance.  Supportive therapy provided to patient.  Cognitive techniques utilized during the session.  Reassurance and supportive therapy provided.  Reoriented to reality and reassured.  Encouraged participation in milieu and group therapy on the unit.  Crisis/safety plan discussed with patient.       Dustin Mcallister MD  Attending Psychiatrist   [facility]      This note has been constructed using a voice recognition system. There may be translation, syntax, or grammatical errors. If  you have any questions, please contact the dictating provider.

## 2025-01-29 NOTE — NURSING NOTE
Patient is calm and cooperative upon approach. Patient visible on unit, socializing with peers. Patient denies SI/HI/AH/VH. Patient compliant with meds and meals.

## 2025-01-29 NOTE — PLAN OF CARE
Problem: Depression  Goal: Treatment Goal: Demonstrate behavioral control of depressive symptoms, verbalize feelings of improved mood/affect, and adopt new coping skills prior to discharge  Outcome: Progressing  Goal: Verbalize thoughts and feelings  Description: Interventions:  - Assess and re-assess patient's level of risk   - Engage patient in 1:1 interactions, daily, for a minimum of 15 minutes   - Encourage patient to express feelings, fears, frustrations, hopes   Outcome: Progressing  Goal: Refrain from harming self  Description: Interventions:  - Monitor patient closely, per order   - Supervise medication ingestion, monitor effects and side effects   Outcome: Progressing  Goal: Refrain from isolation  Description: Interventions:  - Develop a trusting relationship   - Encourage socialization   Outcome: Progressing     Problem: Anxiety  Goal: Anxiety is at manageable level  Description: Interventions:  - Assess and monitor patient's anxiety level.   - Monitor for signs and symptoms (heart palpitations, chest pain, shortness of breath, headaches, nausea, feeling jumpy, restlessness, irritable, apprehensive).   - Collaborate with interdisciplinary team and initiate plan and interventions as ordered.  - [place] patient to unit/surroundings  - Explain treatment plan  - Encourage participation in care  - Encourage verbalization of concerns/fears  - Identify coping mechanisms  - Assist in developing anxiety-reducing skills  - Administer/offer alternative therapies  - Limit or eliminate stimulants  Outcome: Progressing     Problem: DISCHARGE PLANNING - CARE MANAGEMENT  Goal: Discharge to post-acute care or home with appropriate resources  Description: INTERVENTIONS:  - Conduct assessment to determine patient/family and health care team treatment goals, and need for post-acute services based on payer coverage, community resources, and patient preferences, and barriers to discharge  - Address psychosocial, clinical,  and financial barriers to discharge as identified in assessment in conjunction with the patient/family and health care team  - Arrange appropriate level of post-acute services according to patient’s   needs and preference and payer coverage in collaboration with the physician and health care team  - Communicate with and update the patient/family, physician, and health care team regarding progress on the discharge plan  - Arrange appropriate transportation to post-acute venues  Outcome: Progressing     Problem: Knowledge Deficit  Goal: Patient/family/caregiver demonstrates understanding of disease process, treatment plan, medications, and discharge instructions  Description: Complete learning assessment and assess knowledge base.  Interventions:  - Provide teaching at level of understanding  - Provide teaching via preferred learning methods  Outcome: Progressing     Problem: SLEEP DISTURBANCE  Goal: Will exhibit normal sleeping pattern  Description: Interventions:  -  Assess the patients sleep pattern, noting recent changes  - Administer medication as ordered  - Decrease environmental stimuli, including noise, as appropriate during the night  - Encourage the patient to actively participate in unit groups and or exercise during the day to enhance ability to achieve adequate sleep at night  - Assess the patient, in the morning, encouraging a description of sleep experience  Outcome: Progressing

## 2025-01-30 PROCEDURE — 99232 SBSQ HOSP IP/OBS MODERATE 35: CPT | Performed by: PSYCHIATRY & NEUROLOGY

## 2025-01-30 RX ADMIN — SERTRALINE HYDROCHLORIDE 150 MG: 100 TABLET ORAL at 08:26

## 2025-01-30 RX ADMIN — CYANOCOBALAMIN TAB 1000 MCG 1000 MCG: 1000 TAB at 08:27

## 2025-01-30 RX ADMIN — FAMOTIDINE 20 MG: 20 TABLET ORAL at 08:27

## 2025-01-30 RX ADMIN — Medication 2500 UNITS: at 08:27

## 2025-01-30 RX ADMIN — ATORVASTATIN CALCIUM 40 MG: 40 TABLET, FILM COATED ORAL at 17:15

## 2025-01-30 RX ADMIN — LEVOTHYROXINE SODIUM 37.5 MCG: 0.07 TABLET ORAL at 06:48

## 2025-01-30 RX ADMIN — ARIPIPRAZOLE 5 MG: 5 TABLET ORAL at 08:27

## 2025-01-30 RX ADMIN — SENNOSIDES AND DOCUSATE SODIUM 2 TABLET: 50; 8.6 TABLET ORAL at 17:14

## 2025-01-30 RX ADMIN — FAMOTIDINE 20 MG: 20 TABLET ORAL at 17:14

## 2025-01-30 NOTE — PLAN OF CARE
Problem: Ineffective Coping  Goal: Participates in unit activities  Description: Interventions:  - Provide therapeutic environment   - Provide required programming   - Redirect inappropriate behaviors   Outcome: Progressing     Problem: Depression  Goal: Treatment Goal: Demonstrate behavioral control of depressive symptoms, verbalize feelings of improved mood/affect, and adopt new coping skills prior to discharge  Outcome: Progressing  Goal: Verbalize thoughts and feelings  Description: Interventions:  - Assess and re-assess patient's level of risk   - Engage patient in 1:1 interactions, daily, for a minimum of 15 minutes   - Encourage patient to express feelings, fears, frustrations, hopes   Outcome: Progressing  Goal: Refrain from harming self  Description: Interventions:  - Monitor patient closely, per order   - Supervise medication ingestion, monitor effects and side effects   Outcome: Progressing  Goal: Refrain from isolation  Description: Interventions:  - Develop a trusting relationship   - Encourage socialization   Outcome: Progressing     Problem: Anxiety  Goal: Anxiety is at manageable level  Description: Interventions:  - Assess and monitor patient's anxiety level.   - Monitor for signs and symptoms (heart palpitations, chest pain, shortness of breath, headaches, nausea, feeling jumpy, restlessness, irritable, apprehensive).   - Collaborate with interdisciplinary team and initiate plan and interventions as ordered.  - [place] patient to unit/surroundings  - Explain treatment plan  - Encourage participation in care  - Encourage verbalization of concerns/fears  - Identify coping mechanisms  - Assist in developing anxiety-reducing skills  - Administer/offer alternative therapies  - Limit or eliminate stimulants  Outcome: Progressing     Problem: DISCHARGE PLANNING - CARE MANAGEMENT  Goal: Discharge to post-acute care or home with appropriate resources  Description: INTERVENTIONS:  - Conduct assessment to  determine patient/family and health care team treatment goals, and need for post-acute services based on payer coverage, community resources, and patient preferences, and barriers to discharge  - Address psychosocial, clinical, and financial barriers to discharge as identified in assessment in conjunction with the patient/family and health care team  - Arrange appropriate level of post-acute services according to patient’s   needs and preference and payer coverage in collaboration with the physician and health care team  - Communicate with and update the patient/family, physician, and health care team regarding progress on the discharge plan  - Arrange appropriate transportation to post-acute venues  Outcome: Progressing     Problem: Knowledge Deficit  Goal: Patient/family/caregiver demonstrates understanding of disease process, treatment plan, medications, and discharge instructions  Description: Complete learning assessment and assess knowledge base.  Interventions:  - Provide teaching at level of understanding  - Provide teaching via preferred learning methods  Outcome: Progressing     Problem: SLEEP DISTURBANCE  Goal: Will exhibit normal sleeping pattern  Description: Interventions:  -  Assess the patients sleep pattern, noting recent changes  - Administer medication as ordered  - Decrease environmental stimuli, including noise, as appropriate during the night  - Encourage the patient to actively participate in unit groups and or exercise during the day to enhance ability to achieve adequate sleep at night  - Assess the patient, in the morning, encouraging a description of sleep experience  Outcome: Progressing

## 2025-01-30 NOTE — NURSING NOTE
Patient is calm, visible for needs. Denies all psych symptoms but appears depressed. No scheduled medications or PRN's at this time. Denies any unmet needs.

## 2025-01-30 NOTE — PROGRESS NOTES
01/30/25 0838   Team Meeting   Meeting Type Daily Rounds   Team Members Present   Team Members Present Physician;Nurse;   Physician Team Member Ary   [facility] Noa   Patient/Family Present   Patient Present No   Patient's Family Present No     Pt reported not sleeping well last night. Pt is medication and meal compliant. Pt denies SI/HI/AVH. Pt's discharge is pending placement.

## 2025-01-30 NOTE — NURSING NOTE
Seclusive to room at the start of the shift but became a bit more visible as the shift went on. Denies symptoms. Compliant. Pleasant, calm and cooperative. Waiting placement.

## 2025-01-30 NOTE — ASSESSMENT & PLAN NOTE
"Patient continues to do well and the plan is to continue with the current treatment plan as mentioned below with no changes.    Patient continues to be pending group home placement. Otherwise no clinical significant change.  SW and pt cont to work on getting documentation to [facility] office and waiting for follow up from them.    Medication regimen as follows, no changes as of 1/30/2025:  Abilify 5 mg daily as mood adjunct   Zoloft 150 mg daily for depression and anxiety  Continue to encourage participation in group therapy, milieu therapy and occupational therapy.  Continue to assess for side effects of medications.  Continue collaboration with [facility] for medical co-morbidities as indicated.  Continue discussion with CM/SW to assist with obtaining collateral, disposition planning, and the implementation of patient-centered individualized plan of care.  Continue frequent safety checks and vitals per unit protocol.    Risks, benefits and possible side effects of Medications: Risks, benefits, and possible side effects of medications have previously been explained. No new medications at this time.      Legal status: 201    Disposition: to be determined, pending [facility] application for potential group home placement. OT Cognitive Evaluation completed: \"Pt would benefit from discharge to a supportive environment that can provide checks for safety and compliance with IADLs. \"   Although patient's mood has stabilized, they are currently awaiting group home placement.  Their ability to recognize safety hazards take medications and participate in IADLs are significantly impaired by their cognitive deficits compounded by their chronic mental health needs and would be at risk for significant decompensation without the structure and support of a group home setting.      No associated orders from this encounter found during lookback period of 72 hours.  "

## 2025-01-30 NOTE — PROGRESS NOTES
"Progress Note - Behavioral Health   Name: Franco Roberson 39 y.o. male I MRN: [id number]  Unit/Bed#: -01 I Date of Admission: 5/14/2024   Date of Service: 1/30/2025 I Hospital Day: 261     Assessment & Plan  MDD (major depressive disorder), recurrent severe, without psychosis (HCC)  Patient continues to do well and the plan is to continue with the current treatment plan as mentioned below with no changes.    Patient continues to be pending group home placement. Otherwise no clinical significant change.  SW and pt cont to work on getting documentation to [facility] office and waiting for follow up from them.    Medication regimen as follows, no changes as of 1/30/2025:  Abilify 5 mg daily as mood adjunct   Zoloft 150 mg daily for depression and anxiety  Continue to encourage participation in group therapy, milieu therapy and occupational therapy.  Continue to assess for side effects of medications.  Continue collaboration with PATRICIA for medical co-morbidities as indicated.  Continue discussion with CM/SW to assist with obtaining collateral, disposition planning, and the implementation of patient-centered individualized plan of care.  Continue frequent safety checks and vitals per unit protocol.    Risks, benefits and possible side effects of Medications: Risks, benefits, and possible side effects of medications have previously been explained. No new medications at this time.      Legal status: 201    Disposition: to be determined, pending [facility]AR application for potential group home placement. OT Cognitive Evaluation completed: \"Pt would benefit from discharge to a supportive environment that can provide checks for safety and compliance with IADLs. \"   Although patient's mood has stabilized, they are currently awaiting group home placement.  Their ability to recognize safety hazards take medications and participate in IADLs are significantly impaired by their cognitive deficits compounded by their chronic mental health needs " and would be at risk for significant decompensation without the structure and support of a group home setting.      No associated orders from this encounter found during lookback period of 72 hours.  Autism spectrum disorder  Continue supportive care    No associated orders from this encounter found during lookback period of 72 hours.    Hyperlipidemia  - Lipitor 40 mg       Plan     Recommended Treatment:    - Encourage early mobility and having a structured day  - Provide frequent re-orientation, and cognitive stimulation  - Ensure assistive devices are in proper working order (eye-glasses, hearing aids)  - Encourage adequate hydration, nutrition and monitor bowel movements  - Maintain sleep-wake cycle: Uninterrupted sleep time; low-level lighting at night  - Fall precaution  - f/u [facility] recs regarding the medical problems   - Continue medication titration and treatment plan; adjust medication to optimize treatment response and as clinically indicated. .   - Observation: routine            - VS: as per unit protocol  - Diet: Regular diet  - Encourage group attendance and milieu therapy  - Dispo: To be determined     Scheduled medications:  Current Facility-Administered Medications   Medication Dose Route Frequency Provider Last Rate    acetaminophen  650 mg Oral Q6H PRN Basilio Brown MD      acetaminophen  650 mg Oral Q4H PRN Basilio Brown MD      acetaminophen  975 mg Oral Q6H PRN Basilio Brown MD      aluminum-magnesium hydroxide-simethicone  30 mL Oral Q4H PRN Basilio Brown MD      ARIPiprazole  5 mg Oral Daily Basilio Panda MD      Artificial Tears  1 drop Both Eyes Q3H PRN Basilio Brown MD      atorvastatin  40 mg Oral Daily With Dinner WALLY Baptiste      benztropine  1 mg Intramuscular Q4H PRN Max 6/day Basilio Brown MD      benztropine  1 mg Oral Q4H PRN Max 6/day Basilio Brown MD      Cholecalciferol  2,500 Units Oral Daily Dustin Mcallister MD       cyanocobalamin  1,000 mcg Oral Daily WALLY Baptiste      Diclofenac Sodium  2 g Topical 4x Daily PRN WALLY Baptiste      hydrOXYzine HCL  50 mg Oral Q6H PRN Max 4/day Basilio Brown MD      Or    diphenhydrAMINE  50 mg Intramuscular Q6H PRN Basilio Brown MD      diphenhydrAMINE-zinc acetate   Topical Daily PRN Raj Guerrero MD      famotidine  20 mg Oral BID LauraWALLY Tony      hydrOXYzine HCL  100 mg Oral Q6H PRN Max 4/day Basilio Brown MD      Or    LORazepam  2 mg Intramuscular Q6H PRN Basilio Brown MD      hydrOXYzine HCL  25 mg Oral Q6H PRN Max 4/day Basilio Brown MD      levothyroxine  37.5 mcg Oral Early Morning WALLY Baptiste      melatonin  6 mg Oral HS PRN WALLY Gomez      methocarbamol  500 mg Oral Q6H PRN WALLY Baptiste      OLANZapine  5 mg Oral Q4H PRN Max 3/day Basilio Brown MD      Or    OLANZapine  2.5 mg Intramuscular Q4H PRN Max 3/day Basilio Brown MD      OLANZapine  5 mg Oral Q3H PRN Max 3/day Basilio Brown MD      Or    OLANZapine  5 mg Intramuscular Q3H PRN Max 3/day Basilio Brown MD      OLANZapine  2.5 mg Oral Q4H PRN Max 6/day Basilio Brown MD      polyethylene glycol  17 g Oral Daily PRN Basilio Brown MD      propranolol  10 mg Oral Q8H PRN Basilio Brown MD      senna-docusate sodium  2 tablet Oral After Dinner Dustin Mcallister MD      sertraline  150 mg Oral Daily WALLY Gomez      traZODone  50 mg Oral HS PRN Basilio Brown MD        PRN:    acetaminophen    acetaminophen    acetaminophen    aluminum-magnesium hydroxide-simethicone    Artificial Tears    benztropine    benztropine    Diclofenac Sodium    hydrOXYzine HCL **OR** diphenhydrAMINE    diphenhydrAMINE-zinc acetate    hydrOXYzine HCL **OR** LORazepam    hydrOXYzine HCL    melatonin    methocarbamol    OLANZapine **OR** OLANZapine    OLANZapine **OR**  OLANZapine    OLANZapine    polyethylene glycol    propranolol    traZODone       Subjective     Patient was visited on unit for continuing care; chart reviewed and discussed with multidisciplinary treatment team.  On approach, the patient was calm and cooperative. Denied any changes in mood, appetite, and energy level. Denied initial insomnia but reported interrupted sleep. Denied A/VH currently.  Denied SI/HI, intent or plan upon direct inquiry at this time.    Patient continues to be visible in the milieu and interacts with staff and peers. No reports of aggression or self-injurious behavior on unit. No PRN medications used in the past 24 hours.    Patient accepted all offered medications and no adverse effects of medications noted or reported.    Objective    Current Mental Status Evaluation:  Mental Status Exam  Appearance: casually dressed, consistent with stated age  Motor: no psychomotor retardation, no gait abnormalities  Behavior: cooperative, answers questions appropriately  Speech: soft, normal rhythm  Mood: good  Affect: blunted  Thought Process: linear and goal-oriented  Thought Content: denies delusions  Risk Potential: denies suicidal ideation, plan, or intent. Denies homicidal ideation  Perceptions: denies auditory hallucinations, denies visual hallucinations,   Sensorium: Oriented to person, place, time, and situation  Cognition: cognitive ability appears intact but was not quantitatively tested  Consciousness: alert and awake  Attention: intact, able to focus without difficulty  Insight: limited  Judgement: limited            Vital signs in last 24 hours:    Temp:  [97.4 °F (36.3 °C)-97.6 °F (36.4 °C)] 97.6 °F (36.4 °C)  HR:  [71-81] 71  BP: (109-113)/(66-68) 113/66  Resp:  [16] 16  SpO2:  [97 %] 97 %  O2 Device: None (Room air)    Psychiatric Review of Systems:  Medication adverse effects: none  Sleep: unchanged  Appetite: unchanged  Behaviors over the past 24 hours: unchanged    Laboratory  results:    I have personally reviewed all pertinent laboratory/tests results  No results found for this or any previous visit (from the past 48 hours).       Progress Toward Goals & Illness Status:   progressing, attends groups, mood is stabilizing, discharge planning    Patient is not at goal. They are not yet ready for discharge. The patient's condition currently requires active psychopharmacological medication management, interdisciplinary coordination with case management, and the utilization of adjunctive milieu and group therapy to augment psychopharmacological efficacy. The patient's risk of morbidity, and progression or decompensation of psychiatric disease, is higher without this current treatment.     Next of Kin  Extended Emergency Contact Information  Primary Emergency Contact: Lois Roberson  Address: [address] [place] [address] [address] [place]  Home Phone: [phone]  Relation: Mother    Counseling / Coordination of Care  Patient's progress discussed with staff in treatment team meeting.  Medications, treatment progress and treatment plan reviewed with patient.  Medication education provided to patient.  Educated on importance of medication and treatment compliance.  Supportive therapy provided to patient.  Cognitive techniques utilized during the session.  Reassurance and supportive therapy provided.  Reoriented to reality and reassured.  Encouraged participation in milieu and group therapy on the unit.  Crisis/safety plan discussed with patient.       Dustin Mcallister MD  Attending Psychiatrist   [facility]      This note has been constructed using a voice recognition system. There may be translation, syntax, or grammatical errors. If you have any questions, please contact the dictating provider.

## 2025-01-31 PROCEDURE — 99232 SBSQ HOSP IP/OBS MODERATE 35: CPT | Performed by: PSYCHIATRY & NEUROLOGY

## 2025-01-31 RX ADMIN — SERTRALINE HYDROCHLORIDE 150 MG: 100 TABLET ORAL at 08:17

## 2025-01-31 RX ADMIN — SENNOSIDES AND DOCUSATE SODIUM 2 TABLET: 50; 8.6 TABLET ORAL at 17:20

## 2025-01-31 RX ADMIN — FAMOTIDINE 20 MG: 20 TABLET ORAL at 08:17

## 2025-01-31 RX ADMIN — FAMOTIDINE 20 MG: 20 TABLET ORAL at 17:20

## 2025-01-31 RX ADMIN — CYANOCOBALAMIN TAB 1000 MCG 1000 MCG: 1000 TAB at 08:17

## 2025-01-31 RX ADMIN — LEVOTHYROXINE SODIUM 37.5 MCG: 0.07 TABLET ORAL at 06:33

## 2025-01-31 RX ADMIN — Medication 2500 UNITS: at 08:17

## 2025-01-31 RX ADMIN — ARIPIPRAZOLE 5 MG: 5 TABLET ORAL at 08:17

## 2025-01-31 RX ADMIN — ATORVASTATIN CALCIUM 40 MG: 40 TABLET, FILM COATED ORAL at 17:20

## 2025-01-31 NOTE — ASSESSMENT & PLAN NOTE
"Patient continues to do well and the plan is to continue with the current treatment plan as mentioned below with no changes.    Patient continues to be pending group home placement. Otherwise no clinical significant change.  SW and pt cont to work on getting documentation to [facility] office and waiting for follow up from them.    Medication regimen as follows, no changes as of 1/31/2025:  Abilify 5 mg daily as mood adjunct   Zoloft 150 mg daily for depression and anxiety  Continue to encourage participation in group therapy, milieu therapy and occupational therapy.  Continue to assess for side effects of medications.  Continue collaboration with [facility] for medical co-morbidities as indicated.  Continue discussion with CM/SW to assist with obtaining collateral, disposition planning, and the implementation of patient-centered individualized plan of care.  Continue frequent safety checks and vitals per unit protocol.    Risks, benefits and possible side effects of Medications: Risks, benefits, and possible side effects of medications have previously been explained. No new medications at this time.      Legal status: 201    Disposition: to be determined, pending [facility] application for potential group home placement. OT Cognitive Evaluation completed: \"Pt would benefit from discharge to a supportive environment that can provide checks for safety and compliance with IADLs. \"   Although patient's mood has stabilized, they are currently awaiting group home placement.  Their ability to recognize safety hazards take medications and participate in IADLs are significantly impaired by their cognitive deficits compounded by their chronic mental health needs and would be at risk for significant decompensation without the structure and support of a group home setting.      No associated orders from this encounter found during lookback period of 72 hours.  "

## 2025-01-31 NOTE — PLAN OF CARE
Problem: Ineffective Coping  Goal: Participates in unit activities  Description: Interventions:  - Provide therapeutic environment   - Provide required programming   - Redirect inappropriate behaviors   Outcome: Progressing     Problem: Depression  Goal: Treatment Goal: Demonstrate behavioral control of depressive symptoms, verbalize feelings of improved mood/affect, and adopt new coping skills prior to discharge  Outcome: Progressing  Goal: Verbalize thoughts and feelings  Description: Interventions:  - Assess and re-assess patient's level of risk   - Engage patient in 1:1 interactions, daily, for a minimum of 15 minutes   - Encourage patient to express feelings, fears, frustrations, hopes   Outcome: Progressing  Goal: Refrain from harming self  Description: Interventions:  - Monitor patient closely, per order   - Supervise medication ingestion, monitor effects and side effects   Outcome: Progressing  Goal: Refrain from isolation  Description: Interventions:  - Develop a trusting relationship   - Encourage socialization   Outcome: Progressing     Problem: Anxiety  Goal: Anxiety is at manageable level  Description: Interventions:  - Assess and monitor patient's anxiety level.   - Monitor for signs and symptoms (heart palpitations, chest pain, shortness of breath, headaches, nausea, feeling jumpy, restlessness, irritable, apprehensive).   - Collaborate with interdisciplinary team and initiate plan and interventions as ordered.  - [place] patient to unit/surroundings  - Explain treatment plan  - Encourage participation in care  - Encourage verbalization of concerns/fears  - Identify coping mechanisms  - Assist in developing anxiety-reducing skills  - Administer/offer alternative therapies  - Limit or eliminate stimulants  Outcome: Progressing     Problem: Knowledge Deficit  Goal: Patient/family/caregiver demonstrates understanding of disease process, treatment plan, medications, and discharge  instructions  Description: Complete learning assessment and assess knowledge base.  Interventions:  - Provide teaching at level of understanding  - Provide teaching via preferred learning methods  Outcome: Progressing     Problem: SLEEP DISTURBANCE  Goal: Will exhibit normal sleeping pattern  Description: Interventions:  -  Assess the patients sleep pattern, noting recent changes  - Administer medication as ordered  - Decrease environmental stimuli, including noise, as appropriate during the night  - Encourage the patient to actively participate in unit groups and or exercise during the day to enhance ability to achieve adequate sleep at night  - Assess the patient, in the morning, encouraging a description of sleep experience  Outcome: Progressing

## 2025-01-31 NOTE — NURSING NOTE
Pt calm, cooperative, pleasant. Denies any SI/HI/AVH at this time. Pt isolative mainly to room this HS, able to make needs known. No HS medications scheduled. Pt currently reports no unmet needs, Q15 minute checks maintained for safety.

## 2025-01-31 NOTE — PROGRESS NOTES
"Progress Note - Behavioral Health   Name: Franco Roberson 39 y.o. male I MRN: [id number]  Unit/Bed#: -01 I Date of Admission: 5/14/2024   Date of Service: 1/31/2025 I Hospital Day: 262     Assessment & Plan  MDD (major depressive disorder), recurrent severe, without psychosis (HCC)  Patient continues to do well and the plan is to continue with the current treatment plan as mentioned below with no changes.    Patient continues to be pending group home placement. Otherwise no clinical significant change.  SW and pt cont to work on getting documentation to [facility] office and waiting for follow up from them.    Medication regimen as follows, no changes as of 1/31/2025:  Abilify 5 mg daily as mood adjunct   Zoloft 150 mg daily for depression and anxiety  Continue to encourage participation in group therapy, milieu therapy and occupational therapy.  Continue to assess for side effects of medications.  Continue collaboration with PATRICIA for medical co-morbidities as indicated.  Continue discussion with CM/SW to assist with obtaining collateral, disposition planning, and the implementation of patient-centered individualized plan of care.  Continue frequent safety checks and vitals per unit protocol.    Risks, benefits and possible side effects of Medications: Risks, benefits, and possible side effects of medications have previously been explained. No new medications at this time.      Legal status: 201    Disposition: to be determined, pending [facility] application for potential group home placement. OT Cognitive Evaluation completed: \"Pt would benefit from discharge to a supportive environment that can provide checks for safety and compliance with IADLs. \"   Although patient's mood has stabilized, they are currently awaiting group home placement.  Their ability to recognize safety hazards take medications and participate in IADLs are significantly impaired by their cognitive deficits compounded by their chronic mental health needs " and would be at risk for significant decompensation without the structure and support of a group home setting.      No associated orders from this encounter found during lookback period of 72 hours.  Autism spectrum disorder  Continue supportive care    No associated orders from this encounter found during lookback period of 72 hours.    Hyperlipidemia  - Lipitor 40 mg       Plan     Recommended Treatment:    - Encourage early mobility and having a structured day  - Provide frequent re-orientation, and cognitive stimulation  - Ensure assistive devices are in proper working order (eye-glasses, hearing aids)  - Encourage adequate hydration, nutrition and monitor bowel movements  - Maintain sleep-wake cycle: Uninterrupted sleep time; low-level lighting at night  - Fall precaution  - f/u [facility] recs regarding the medical problems   - Continue medication titration and treatment plan; adjust medication to optimize treatment response and as clinically indicated. .   - Observation: routine            - VS: as per unit protocol  - Diet: Regular diet  - Encourage group attendance and milieu therapy  - Dispo: To be determined     Scheduled medications:  Current Facility-Administered Medications   Medication Dose Route Frequency Provider Last Rate    acetaminophen  650 mg Oral Q6H PRN Basilio Brown MD      acetaminophen  650 mg Oral Q4H PRN Basilio Brown MD      acetaminophen  975 mg Oral Q6H PRN Basilio Brown MD      aluminum-magnesium hydroxide-simethicone  30 mL Oral Q4H PRN Basilio Brown MD      ARIPiprazole  5 mg Oral Daily Basilio Panda MD      Artificial Tears  1 drop Both Eyes Q3H PRN Basilio Brown MD      atorvastatin  40 mg Oral Daily With Dinner WALLY Baptiste      benztropine  1 mg Intramuscular Q4H PRN Max 6/day Basilio Brown MD      benztropine  1 mg Oral Q4H PRN Max 6/day Basilio Brown MD      Cholecalciferol  2,500 Units Oral Daily Dustin Mcallister MD       cyanocobalamin  1,000 mcg Oral Daily WALLY Baptiste      Diclofenac Sodium  2 g Topical 4x Daily PRN WALLY Baptiste      hydrOXYzine HCL  50 mg Oral Q6H PRN Max 4/day Basilio Brown MD      Or    diphenhydrAMINE  50 mg Intramuscular Q6H PRN Basilio Brown MD      diphenhydrAMINE-zinc acetate   Topical Daily PRN Raj Guerrero MD      famotidine  20 mg Oral BID LauraWALLY Tony      hydrOXYzine HCL  100 mg Oral Q6H PRN Max 4/day Basilio Brown MD      Or    LORazepam  2 mg Intramuscular Q6H PRN Basilio Brown MD      hydrOXYzine HCL  25 mg Oral Q6H PRN Max 4/day Basilio Brown MD      levothyroxine  37.5 mcg Oral Early Morning WALLY Baptiste      melatonin  6 mg Oral HS PRN WALLY Gomez      methocarbamol  500 mg Oral Q6H PRN WALLY Baptiste      OLANZapine  5 mg Oral Q4H PRN Max 3/day Basilio Brown MD      Or    OLANZapine  2.5 mg Intramuscular Q4H PRN Max 3/day Basilio Brown MD      OLANZapine  5 mg Oral Q3H PRN Max 3/day Basilio Brown MD      Or    OLANZapine  5 mg Intramuscular Q3H PRN Max 3/day Basilio Brown MD      OLANZapine  2.5 mg Oral Q4H PRN Max 6/day Basilio Brown MD      polyethylene glycol  17 g Oral Daily PRN Basilio Brown MD      propranolol  10 mg Oral Q8H PRN Basilio Brown MD      senna-docusate sodium  2 tablet Oral After Dinner Dustin Mcallister MD      sertraline  150 mg Oral Daily WALLY Gomez      traZODone  50 mg Oral HS PRN Basilio Brown MD        PRN:    acetaminophen    acetaminophen    acetaminophen    aluminum-magnesium hydroxide-simethicone    Artificial Tears    benztropine    benztropine    Diclofenac Sodium    hydrOXYzine HCL **OR** diphenhydrAMINE    diphenhydrAMINE-zinc acetate    hydrOXYzine HCL **OR** LORazepam    hydrOXYzine HCL    melatonin    methocarbamol    OLANZapine **OR** OLANZapine    OLANZapine **OR**  "OLANZapine    OLANZapine    polyethylene glycol    propranolol    traZODone       Subjective     Patient was visited on unit for continuing care; chart reviewed and discussed with multidisciplinary treatment team.  On approach, the patient was calm and cooperative. Denied any changes in mood, appetite, and energy level. No problem initiating and maintaining sleep.  Denied A/VH currently.  Denied SI/HI, intent or plan upon direct inquiry at this time. Reports sleeping better last night compared to the day prior.    Patient continues to be visible in the milieu and interacts with staff and peers. No reports of aggression or self-injurious behavior on unit. No PRN medications used in the past 24 hours.    Patient accepted all offered medications and no adverse effects of medications noted or reported.    Objective    Current Mental Status Evaluation:  Mental Status Exam  Appearance: casually dressed, consistent with stated age  Motor: no psychomotor retardation, no gait abnormalities  Behavior: cooperative, answers questions appropriately  Speech: soft, normal rhythm  Mood: \"good\"  Affect: blunted  Thought Process: linear and goal-oriented  Thought Content: denies delusions  Risk Potential: denies suicidal ideation, plan, or intent. Denies homicidal ideation  Perceptions: denies auditory hallucinations, denies visual hallucinations,   Sensorium: Oriented to person, place, time, and situation  Cognition: cognitive ability appears intact but was not quantitatively tested  Consciousness: alert and awake  Attention: intact, able to focus without difficulty  Insight: limited  Judgement: limited            Vital signs in last 24 hours:    Temp:  [97.4 °F (36.3 °C)] 97.4 °F (36.3 °C)  HR:  [66-80] 66  BP: (102-116)/(68-74) 116/74  Resp:  [17] 17  SpO2:  [97 %-100 %] 97 %  O2 Device: None (Room air)    Psychiatric Review of Systems:  Medication adverse effects: none  Sleep: unchanged  Appetite: unchanged  Behaviors over the " past 24 hours: unchanged    Laboratory results:    I have personally reviewed all pertinent laboratory/tests results  No results found for this or any previous visit (from the past 48 hours).       Progress Toward Goals & Illness Status:   progressing, mood is stabilizing, placement pending group home    Patient is not at goal. They are not yet ready for discharge. The patient's condition currently requires active psychopharmacological medication management, interdisciplinary coordination with case management, and the utilization of adjunctive milieu and group therapy to augment psychopharmacological efficacy. The patient's risk of morbidity, and progression or decompensation of psychiatric disease, is higher without this current treatment.     Next of Kin  Extended Emergency Contact Information  Primary Emergency Contact: Lois Roberson  Address: [address] [place] [address]  Home Phone: [phone]  Relation: Mother    Counseling / Coordination of Care  Patient's progress discussed with staff in treatment team meeting.  Medications, treatment progress and treatment plan reviewed with patient.  Medication education provided to patient.  Educated on importance of medication and treatment compliance.  Supportive therapy provided to patient.  Cognitive techniques utilized during the session.  Reassurance and supportive therapy provided.  Reoriented to reality and reassured.  Encouraged participation in milieu and group therapy on the unit.  Crisis/safety plan discussed with patient.       Dustin Mcallister MD  Attending Psychiatrist   [facility]      This note has been constructed using a voice recognition system. There may be translation, syntax, or grammatical errors. If you have any questions, please contact the dictating provider.

## 2025-01-31 NOTE — NURSING NOTE
Patient has been seclusive to his room as of this time. Only out of bed for his breakfast tray. Reports sleeping better last night than the night before. Denies symptoms. Compliant. Waiting placement.

## 2025-01-31 NOTE — PLAN OF CARE
Pt intermittently attending groups. Pt typically quiet, but participates appropriately when prompted.

## 2025-02-01 PROCEDURE — 99232 SBSQ HOSP IP/OBS MODERATE 35: CPT | Performed by: PSYCHIATRY & NEUROLOGY

## 2025-02-01 RX ADMIN — ATORVASTATIN CALCIUM 40 MG: 40 TABLET, FILM COATED ORAL at 17:04

## 2025-02-01 RX ADMIN — ARIPIPRAZOLE 5 MG: 5 TABLET ORAL at 09:10

## 2025-02-01 RX ADMIN — Medication 2500 UNITS: at 09:10

## 2025-02-01 RX ADMIN — SERTRALINE HYDROCHLORIDE 150 MG: 100 TABLET ORAL at 09:10

## 2025-02-01 RX ADMIN — FAMOTIDINE 20 MG: 20 TABLET ORAL at 09:10

## 2025-02-01 RX ADMIN — FAMOTIDINE 20 MG: 20 TABLET ORAL at 17:04

## 2025-02-01 RX ADMIN — LEVOTHYROXINE SODIUM 37.5 MCG: 0.07 TABLET ORAL at 06:26

## 2025-02-01 RX ADMIN — SENNOSIDES AND DOCUSATE SODIUM 2 TABLET: 50; 8.6 TABLET ORAL at 17:04

## 2025-02-01 RX ADMIN — CYANOCOBALAMIN TAB 1000 MCG 1000 MCG: 1000 TAB at 09:10

## 2025-02-01 NOTE — NURSING NOTE
Patient secluded to room, eating meal in room. Patient encouraged to participate in group activities with no success. Patient appears depressed although denies SI/HI/AH/VH and depression. Compliant with medications and routine vitals.

## 2025-02-01 NOTE — NURSING NOTE
Pt withdrawn to room and declining to participate in groups or milieu activities. Med and meal compliant. Denies SI/HI/AH/VH at this time. Pt appears depressed but denies any feelings of sadness. Denies any unmet needs or complaints.

## 2025-02-01 NOTE — PLAN OF CARE
Problem: Ineffective Coping  Goal: Participates in unit activities  Description: Interventions:  - Provide therapeutic environment   - Provide required programming   - Redirect inappropriate behaviors   Outcome: Progressing     Problem: Depression  Goal: Treatment Goal: Demonstrate behavioral control of depressive symptoms, verbalize feelings of improved mood/affect, and adopt new coping skills prior to discharge  Outcome: Progressing  Goal: Verbalize thoughts and feelings  Description: Interventions:  - Assess and re-assess patient's level of risk   - Engage patient in 1:1 interactions, daily, for a minimum of 15 minutes   - Encourage patient to express feelings, fears, frustrations, hopes   Outcome: Progressing  Goal: Refrain from harming self  Description: Interventions:  - Monitor patient closely, per order   - Supervise medication ingestion, monitor effects and side effects   Outcome: Progressing  Goal: Refrain from isolation  Description: Interventions:  - Develop a trusting relationship   - Encourage socialization   Outcome: Progressing     Problem: Anxiety  Goal: Anxiety is at manageable level  Description: Interventions:  - Assess and monitor patient's anxiety level.   - Monitor for signs and symptoms (heart palpitations, chest pain, shortness of breath, headaches, nausea, feeling jumpy, restlessness, irritable, apprehensive).   - Collaborate with interdisciplinary team and initiate plan and interventions as ordered.  - [place] patient to unit/surroundings  - Explain treatment plan  - Encourage participation in care  - Encourage verbalization of concerns/fears  - Identify coping mechanisms  - Assist in developing anxiety-reducing skills  - Administer/offer alternative therapies  - Limit or eliminate stimulants  Outcome: Progressing     Problem: DISCHARGE PLANNING - CARE MANAGEMENT  Goal: Discharge to post-acute care or home with appropriate resources  Description: INTERVENTIONS:  - Conduct assessment to  determine patient/family and health care team treatment goals, and need for post-acute services based on payer coverage, community resources, and patient preferences, and barriers to discharge  - Address psychosocial, clinical, and financial barriers to discharge as identified in assessment in conjunction with the patient/family and health care team  - Arrange appropriate level of post-acute services according to patient’s   needs and preference and payer coverage in collaboration with the physician and health care team  - Communicate with and update the patient/family, physician, and health care team regarding progress on the discharge plan  - Arrange appropriate transportation to post-acute venues  Outcome: Progressing     Problem: Knowledge Deficit  Goal: Patient/family/caregiver demonstrates understanding of disease process, treatment plan, medications, and discharge instructions  Description: Complete learning assessment and assess knowledge base.  Interventions:  - Provide teaching at level of understanding  - Provide teaching via preferred learning methods  Outcome: Progressing     Problem: SLEEP DISTURBANCE  Goal: Will exhibit normal sleeping pattern  Description: Interventions:  -  Assess the patients sleep pattern, noting recent changes  - Administer medication as ordered  - Decrease environmental stimuli, including noise, as appropriate during the night  - Encourage the patient to actively participate in unit groups and or exercise during the day to enhance ability to achieve adequate sleep at night  - Assess the patient, in the morning, encouraging a description of sleep experience  Outcome: Progressing

## 2025-02-01 NOTE — ASSESSMENT & PLAN NOTE
"Patient continues to do well and the plan is to continue with the current treatment plan as mentioned below with no changes.    Patient continues to be pending group home placement. Otherwise no clinical significant change.  SW and pt cont to work on getting documentation to [facility] office and waiting for follow up from them.    Medication regimen as follows, no changes as of 2/1/2025:  Abilify 5 mg daily as mood adjunct   Zoloft 150 mg daily for depression and anxiety  Continue to encourage participation in group therapy, milieu therapy and occupational therapy.  Continue to assess for side effects of medications.  Continue collaboration with [facility] for medical co-morbidities as indicated.  Continue discussion with CM/SW to assist with obtaining collateral, disposition planning, and the implementation of patient-centered individualized plan of care.  Continue frequent safety checks and vitals per unit protocol.    Risks, benefits and possible side effects of Medications: Risks, benefits, and possible side effects of medications have previously been explained. No new medications at this time.      Legal status: 201    Disposition: to be determined, pending [facility] application for potential group home placement. OT Cognitive Evaluation completed: \"Pt would benefit from discharge to a supportive environment that can provide checks for safety and compliance with IADLs. \"   Although patient's mood has stabilized, they are currently awaiting group home placement.  Their ability to recognize safety hazards take medications and participate in IADLs are significantly impaired by their cognitive deficits compounded by their chronic mental health needs and would be at risk for significant decompensation without the structure and support of a group home setting.      No associated orders from this encounter found during lookback period of 72 hours.  "

## 2025-02-01 NOTE — PROGRESS NOTES
"Progress Note - Behavioral Health   Name: Franco Roberson 39 y.o. male I MRN: [id number]  Unit/Bed#: -01 I Date of Admission: 5/14/2024   Date of Service: 2/1/2025 I Hospital Day: 263    Assessment & Plan  MDD (major depressive disorder), recurrent severe, without psychosis (HCC)  Patient continues to do well and the plan is to continue with the current treatment plan as mentioned below with no changes.    Patient continues to be pending group home placement. Otherwise no clinical significant change.  SW and pt cont to work on getting documentation to [facility] office and waiting for follow up from them.    Medication regimen as follows, no changes as of 2/1/2025:  Abilify 5 mg daily as mood adjunct   Zoloft 150 mg daily for depression and anxiety  Continue to encourage participation in group therapy, milieu therapy and occupational therapy.  Continue to assess for side effects of medications.  Continue collaboration with [facility] for medical co-morbidities as indicated.  Continue discussion with CM/SW to assist with obtaining collateral, disposition planning, and the implementation of patient-centered individualized plan of care.  Continue frequent safety checks and vitals per unit protocol.    Risks, benefits and possible side effects of Medications: Risks, benefits, and possible side effects of medications have previously been explained. No new medications at this time.      Legal status: 201    Disposition: to be determined, pending [facility] application for potential group home placement. OT Cognitive Evaluation completed: \"Pt would benefit from discharge to a supportive environment that can provide checks for safety and compliance with IADLs. \"   Although patient's mood has stabilized, they are currently awaiting group home placement.  Their ability to recognize safety hazards take medications and participate in IADLs are significantly impaired by their cognitive deficits compounded by their chronic mental health needs and " "would be at risk for significant decompensation without the structure and support of a group home setting.      No associated orders from this encounter found during lookback period of 72 hours.  Autism spectrum disorder  Continue supportive care    No associated orders from this encounter found during lookback period of 72 hours.    Hyperlipidemia  - Lipitor 40 mg         ------------------------------------------------------------      Recommended Treatment Plan:   Behavioral checks every 15 minutes  Encourage participation in group therapy, milieu therapy and occupational therapy.  Assess for side effects of medications.  Collaboration with [facility] for medical co-morbidities as indicated.  Continue discussion with CM/SW to assist with obtaining collateral, disposition planning, and the implementation of patient-centered individualized plan of care.  Estimated Discharge Date: 2/28/2025        Risks / Benefits of Treatment:    Risks, benefits, and possible side effects of medications explained to patient. Patient has limited understanding of risks and benefits of treatment at this time, but agrees to take medications as prescribed.    Subjective: Patient's chart was reviewed. Patient's progress and plan was discussed with treatment team. Per nursing report, Franco has been seclusive to room, pleasant, cooperative, calm, denying SI/HI/AVH, intermittently attending groups on the unit. He remains compliant with medications.     Behavioral PRN medications over the past 24 hours: none    Franco was evaluated this morning for continuity of care. On examination, Franco is calm, cooperative, scant in conversation. He states his mood is \" pretty good.\" He reports sleeping good. His appetite has been good. He denies adverse effects from medications. He denies active or passive suicidal ideation and homicidal ideation. He denies auditory or visual hallucinations.  Patient encouraged to go to groups and to continue with his ADLs. " " No other concerns or complaints noted by patient.    Psychiatric Review of Systems:  Behavior over the last 24 hours: unchanged  Sleep: normal  Appetite: adequate  Medication side effects: none verbalized  Medical ROS: Complete review of systems is negative except as noted above.    Vital signs in last 24 hours:  Temp:  [97 °F (36.1 °C)-97.3 °F (36.3 °C)] 97.3 °F (36.3 °C)  HR:  [67-70] 67  BP: (104-123)/(69-71) 104/71  Resp:  [16] 16  SpO2:  [98 %] 98 %  O2 Device: None (Room air)    VS: Reviewed, within normal limits    Mental Status Exam:    Appearance:  marginal grooming and hygiene, casually dressed, appears stated age, and overweight   Behavior:  calm, cooperative, and laying in bed   Speech:  spontaneous, normal rate, soft, scant, and coherent   Mood:  \"Pretty good\"   Affect:  blunted   Thought Process:  organized, goal directed   Thought Content:  no verbalized delusions or overt paranoia   Perceptual Disturbances: no reported hallucinations and does not appear to be responding to internal stimuli at this time   Risk Potential: Suicidal ideation -  Denies at present   Homicidal ideation - Denies at present  Potential for aggression - No   Sensorium:  oriented to person, place, and time/date   Memory:  recent and remote memory grossly intact   Consciousness:  alert and awake   Attention/Concentration: attention span and concentration are age appropriate   Insight:  limited   Judgment: limited   Gait/Station: normal gait/station, normal balance   Motor Activity: no abnormal movements       Behavioral Health Medications: all current active meds have been reviewed. Changes as in plan section above.    Current Medications:  Current Facility-Administered Medications   Medication Dose Route Frequency Provider Last Rate    acetaminophen  650 mg Oral Q6H PRN Basilio Brown MD      acetaminophen  650 mg Oral Q4H PRN Basilio Brown MD      acetaminophen  975 mg Oral Q6H PRN Basilio Brown MD      " aluminum-magnesium hydroxide-simethicone  30 mL Oral Q4H PRN Basilio Brown MD      ARIPiprazole  5 mg Oral Daily Basilio Panda MD      Artificial Tears  1 drop Both Eyes Q3H PRN Basilio Brown MD      atorvastatin  40 mg Oral Daily With Dinner WALLY Baptiste      benztropine  1 mg Intramuscular Q4H PRN Max 6/day Basilio Brown MD      benztropine  1 mg Oral Q4H PRN Max 6/day Basilio Brown MD      Cholecalciferol  2,500 Units Oral Daily Dustin Mcallister MD      cyanocobalamin  1,000 mcg Oral Daily WALLY Baptiste      Diclofenac Sodium  2 g Topical 4x Daily PRN WALLY Baptiste      hydrOXYzine HCL  50 mg Oral Q6H PRN Max 4/day Basilio Brown MD      Or    diphenhydrAMINE  50 mg Intramuscular Q6H PRN Basilio Brown MD      diphenhydrAMINE-zinc acetate   Topical Daily PRN Raj Guerrero MD      famotidine  20 mg Oral BID WALLY Baptiste      hydrOXYzine HCL  100 mg Oral Q6H PRN Max 4/day Basilio Brown MD      Or    LORazepam  2 mg Intramuscular Q6H PRN Basilio Brown MD      hydrOXYzine HCL  25 mg Oral Q6H PRN Max 4/day Basilio Brown MD      levothyroxine  37.5 mcg Oral Early Morning WALLY Baptiste      melatonin  6 mg Oral HS PRN WALLY Gomez      methocarbamol  500 mg Oral Q6H PRN WALLY Baptiste      OLANZapine  5 mg Oral Q4H PRN Max 3/day Basilio Brown MD      Or    OLANZapine  2.5 mg Intramuscular Q4H PRN Max 3/day Basilio Brown MD      OLANZapine  5 mg Oral Q3H PRN Max 3/day Basilio Brown MD      Or    OLANZapine  5 mg Intramuscular Q3H PRN Max 3/day Basilio Brown MD      OLANZapine  2.5 mg Oral Q4H PRN Max 6/day Basilio Brown MD      polyethylene glycol  17 g Oral Daily PRN Basilio Brown MD      propranolol  10 mg Oral Q8H PRN Basilio Brown MD      senna-docusate sodium  2 tablet Oral After Dinner Dustin Mcallister MD       sertraline  150 mg Oral Daily WALLY Gomez      traZODone  50 mg Oral HS PRN Basilio Brown MD         Laboratory results:  I have personally reviewed all pertinent laboratory/tests results.   No results found for this or any previous visit (from the past 48 hours).     Progress Toward Goals: progressing slowly    Counseling / Coordination of Care:  Patient's progress discussed with staff in treatment team meeting.  Medication changes reviewed with nursing staff.  Medication changes reviewed with staff in treatment team meeting.  Patient's progress reviewed with nursing staff.      Crys Plasencia DO 02/01/25  Psychiatry Residency, PGY-2  This note has been constructed using a voice recognition system. There may be translation, syntax, or grammatical errors. If you have any questions, please contact the dictating author.

## 2025-02-02 PROCEDURE — 99232 SBSQ HOSP IP/OBS MODERATE 35: CPT | Performed by: PSYCHIATRY & NEUROLOGY

## 2025-02-02 RX ADMIN — CYANOCOBALAMIN TAB 1000 MCG 1000 MCG: 1000 TAB at 08:51

## 2025-02-02 RX ADMIN — ATORVASTATIN CALCIUM 40 MG: 40 TABLET, FILM COATED ORAL at 17:15

## 2025-02-02 RX ADMIN — FAMOTIDINE 20 MG: 20 TABLET ORAL at 17:15

## 2025-02-02 RX ADMIN — SERTRALINE HYDROCHLORIDE 150 MG: 100 TABLET ORAL at 08:51

## 2025-02-02 RX ADMIN — ARIPIPRAZOLE 5 MG: 5 TABLET ORAL at 08:51

## 2025-02-02 RX ADMIN — Medication 2500 UNITS: at 08:51

## 2025-02-02 RX ADMIN — SENNOSIDES AND DOCUSATE SODIUM 2 TABLET: 50; 8.6 TABLET ORAL at 17:15

## 2025-02-02 RX ADMIN — FAMOTIDINE 20 MG: 20 TABLET ORAL at 08:51

## 2025-02-02 RX ADMIN — LEVOTHYROXINE SODIUM 37.5 MCG: 0.07 TABLET ORAL at 06:47

## 2025-02-02 NOTE — PLAN OF CARE
Problem: Depression  Goal: Treatment Goal: Demonstrate behavioral control of depressive symptoms, verbalize feelings of improved mood/affect, and adopt new coping skills prior to discharge  Outcome: Progressing  Goal: Verbalize thoughts and feelings  Description: Interventions:  - Assess and re-assess patient's level of risk   - Engage patient in 1:1 interactions, daily, for a minimum of 15 minutes   - Encourage patient to express feelings, fears, frustrations, hopes   Outcome: Progressing  Goal: Refrain from harming self  Description: Interventions:  - Monitor patient closely, per order   - Supervise medication ingestion, monitor effects and side effects   Outcome: Progressing  Goal: Refrain from isolation  Description: Interventions:  - Develop a trusting relationship   - Encourage socialization   Outcome: Progressing     Problem: Anxiety  Goal: Anxiety is at manageable level  Description: Interventions:  - Assess and monitor patient's anxiety level.   - Monitor for signs and symptoms (heart palpitations, chest pain, shortness of breath, headaches, nausea, feeling jumpy, restlessness, irritable, apprehensive).   - Collaborate with interdisciplinary team and initiate plan and interventions as ordered.  - [place] patient to unit/surroundings  - Explain treatment plan  - Encourage participation in care  - Encourage verbalization of concerns/fears  - Identify coping mechanisms  - Assist in developing anxiety-reducing skills  - Administer/offer alternative therapies  - Limit or eliminate stimulants  Outcome: Progressing     Problem: DISCHARGE PLANNING - CARE MANAGEMENT  Goal: Discharge to post-acute care or home with appropriate resources  Description: INTERVENTIONS:  - Conduct assessment to determine patient/family and health care team treatment goals, and need for post-acute services based on payer coverage, community resources, and patient preferences, and barriers to discharge  - Address psychosocial, clinical,  and financial barriers to discharge as identified in assessment in conjunction with the patient/family and health care team  - Arrange appropriate level of post-acute services according to patient’s   needs and preference and payer coverage in collaboration with the physician and health care team  - Communicate with and update the patient/family, physician, and health care team regarding progress on the discharge plan  - Arrange appropriate transportation to post-acute venues  Outcome: Progressing     Problem: Knowledge Deficit  Goal: Patient/family/caregiver demonstrates understanding of disease process, treatment plan, medications, and discharge instructions  Description: Complete learning assessment and assess knowledge base.  Interventions:  - Provide teaching at level of understanding  - Provide teaching via preferred learning methods  Outcome: Progressing     Problem: SLEEP DISTURBANCE  Goal: Will exhibit normal sleeping pattern  Description: Interventions:  -  Assess the patients sleep pattern, noting recent changes  - Administer medication as ordered  - Decrease environmental stimuli, including noise, as appropriate during the night  - Encourage the patient to actively participate in unit groups and or exercise during the day to enhance ability to achieve adequate sleep at night  - Assess the patient, in the morning, encouraging a description of sleep experience  Outcome: Progressing     Problem: Ineffective Coping  Goal: Participates in unit activities  Description: Interventions:  - Provide therapeutic environment   - Provide required programming   - Redirect inappropriate behaviors   Outcome: Not Progressing

## 2025-02-02 NOTE — NURSING NOTE
Patient was walking in the hallway for an hour or so this evening and is pleasant when an interaction is initiated but replies with one or two words. Following HS snack he was in his room and he has remained there. He denies S.I.H.I.A/H V/H

## 2025-02-02 NOTE — PROGRESS NOTES
"Progress Note - Behavioral Health   Name: Franco Roberson 39 y.o. male I MRN: [id number]  Unit/Bed#: -01 I Date of Admission: 5/14/2024   Date of Service: 2/2/2025 I Hospital Day: 264    Assessment & Plan  MDD (major depressive disorder), recurrent severe, without psychosis (HCC)  Patient continues to do well and the plan is to continue with the current treatment plan as mentioned below with no changes.    Patient continues to be pending group home placement. Otherwise no clinical significant change.  SW and pt cont to work on getting documentation to [facility] office and waiting for follow up from them.    Medication regimen as follows, no changes as of 2/2/2025:  Abilify 5 mg daily as mood adjunct   Zoloft 150 mg daily for depression and anxiety  Continue to encourage participation in group therapy, milieu therapy and occupational therapy.  Continue to assess for side effects of medications.  Continue collaboration with PATRICIA for medical co-morbidities as indicated.  Continue discussion with CM/SW to assist with obtaining collateral, disposition planning, and the implementation of patient-centered individualized plan of care.  Continue frequent safety checks and vitals per unit protocol.    Risks, benefits and possible side effects of Medications: Risks, benefits, and possible side effects of medications have previously been explained. No new medications at this time.      Legal status: 201    Disposition: to be determined, pending SOAR application for potential group home placement. OT Cognitive Evaluation completed: \"Pt would benefit from discharge to a supportive environment that can provide checks for safety and compliance with IADLs. \"   Although patient's mood has stabilized, they are currently awaiting group home placement.  Their ability to recognize safety hazards take medications and participate in IADLs are significantly impaired by their cognitive deficits compounded by their chronic mental health needs and " "would be at risk for significant decompensation without the structure and support of a group home setting.      No associated orders from this encounter found during lookback period of 72 hours.  Autism spectrum disorder  Continue supportive care    No associated orders from this encounter found during lookback period of 72 hours.    Hyperlipidemia  - Lipitor 40 mg         ------------------------------------------------------------      Recommended Treatment Plan:   Behavioral checks every 15 minutes  Encourage participation in group therapy, milieu therapy and occupational therapy.  Assess for side effects of medications.  Collaboration with PATRICIA for medical co-morbidities as indicated.  Continue discussion with CM/SW to assist with obtaining collateral, disposition planning, and the implementation of patient-centered individualized plan of care.  Estimated Discharge Date: 2/28/2025        Risks / Benefits of Treatment:    Risks, benefits, and possible side effects of medications explained to patient. Patient has limited understanding of risks and benefits of treatment at this time, but agrees to take medications as prescribed.    Subjective: Patient's chart was reviewed. Patient's progress and plan was discussed with treatment team. Per nursing report, Franco has been withdrawn to room, declining to participate in group or milieu activities, denying SI/HI/AVH, appeared to be depressed, but denied feelings of sadness, pleasant and cooperative on the unit. He remains compliant with medications.     Behavioral PRN medications over the past 24 hours: None    Franco was evaluated this morning for continuity of care. On examination, Franco is calm, cooperative, bright on approach. He states his mood is \" okay.\" He reports sleeping well. His appetite has been good. He denies adverse effects from medications. He denies active or passive suicidal ideation and homicidal ideation. He denies auditory or visual " "hallucinations.  He states that his depression and anxiety are manageable today.  He states that he went to groups yesterday to play jeopardy.  He states that he took a shower yesterday.  No other concerns or complaints noted by patient.    Psychiatric Review of Systems:  Behavior over the last 24 hours: unchanged  Sleep: normal  Appetite: adequate  Medication side effects: none verbalized  Medical ROS: Complete review of systems is negative except as noted above.    Vital signs in last 24 hours:  Temp:  [97.5 °F (36.4 °C)-97.8 °F (36.6 °C)] 97.8 °F (36.6 °C)  HR:  [65-84] 65  BP: (113-120)/(73-77) 113/77  Resp:  [16] 16  SpO2:  [96 %-97 %] 96 %  O2 Device: None (Room air)    VS: Reviewed, within normal limits    Mental Status Exam:    Appearance:  marginal grooming and hygiene, casually dressed, appears stated age, and overweight   Behavior:  calm, cooperative, laying in bed, and fair eye contact   Speech:  spontaneous, normal rate, soft, scant, and coherent   Mood:  \"ok\"   Affect:  blunted   Thought Process:  organized, goal directed   Thought Content:  no verbalized delusions or overt paranoia   Perceptual Disturbances: no reported hallucinations and does not appear to be responding to internal stimuli at this time   Risk Potential: Suicidal ideation -  Denies at present   Homicidal ideation - Denies at present  Potential for aggression - Not at present   Sensorium:  oriented to person, place, and time/date   Memory:  recent and remote memory grossly intact   Consciousness:  alert and awake   Attention/Concentration: attention span and concentration are age appropriate   Insight:  limited   Judgment: limited   Gait/Station: normal gait/station, normal balance   Motor Activity: no abnormal movements       Behavioral Health Medications: all current active meds have been reviewed. Changes as in plan section above.    Current Medications:  Current Facility-Administered Medications   Medication Dose Route Frequency " Provider Last Rate    acetaminophen  650 mg Oral Q6H PRN Basilio Brown MD      acetaminophen  650 mg Oral Q4H PRN Basilio Brown MD      acetaminophen  975 mg Oral Q6H PRN Basilio Brown MD      aluminum-magnesium hydroxide-simethicone  30 mL Oral Q4H PRN Basilio Brown MD      ARIPiprazole  5 mg Oral Daily Basilio Panda MD      Artificial Tears  1 drop Both Eyes Q3H PRN Basilio Brown MD      atorvastatin  40 mg Oral Daily With Dinner WALLY Baptiste      benztropine  1 mg Intramuscular Q4H PRN Max 6/day Basilio Brown MD      benztropine  1 mg Oral Q4H PRN Max 6/day Basilio Brown MD      Cholecalciferol  2,500 Units Oral Daily Dustin Mcallister MD      cyanocobalamin  1,000 mcg Oral Daily WALLY Baptiste      Diclofenac Sodium  2 g Topical 4x Daily PRN WALLY Baptiste      hydrOXYzine HCL  50 mg Oral Q6H PRN Max 4/day Basilio Brown MD      Or    diphenhydrAMINE  50 mg Intramuscular Q6H PRN Basilio Brown MD      diphenhydrAMINE-zinc acetate   Topical Daily PRN Raj Guerrero MD      famotidine  20 mg Oral BID WALLY Baptiste      hydrOXYzine HCL  100 mg Oral Q6H PRN Max 4/day Basilio Brown MD      Or    LORazepam  2 mg Intramuscular Q6H PRN Basilio Brown MD      hydrOXYzine HCL  25 mg Oral Q6H PRN Max 4/day Basilio Brown MD      levothyroxine  37.5 mcg Oral Early Morning WALLY Baptiste      melatonin  6 mg Oral HS PRN WALLY Gomez      methocarbamol  500 mg Oral Q6H PRN WALLY Baptiste      OLANZapine  5 mg Oral Q4H PRN Max 3/day Basilio Brown MD      Or    OLANZapine  2.5 mg Intramuscular Q4H PRN Max 3/day Basilio Brown MD      OLANZapine  5 mg Oral Q3H PRN Max 3/day Basilio Brown MD      Or    OLANZapine  5 mg Intramuscular Q3H PRN Max 3/day Basilio Brown MD      OLANZapine  2.5 mg Oral Q4H PRN Max 6/day Basilio Brown MD       polyethylene glycol  17 g Oral Daily PRN Basilio Brown MD      propranolol  10 mg Oral Q8H PRN Basilio Brown MD      senna-docusate sodium  2 tablet Oral After Dinner Dustin Mcallister MD      sertraline  150 mg Oral Daily WALLY Gomez      traZODone  50 mg Oral HS PRN Basilio Brown MD         Laboratory results:  I have personally reviewed all pertinent laboratory/tests results.   No results found for this or any previous visit (from the past 48 hours).     Progress Toward Goals: progressing slowly    Counseling / Coordination of Care:  Patient's progress discussed with staff in treatment team meeting.  Medication changes reviewed with nursing staff.  Medication changes reviewed with staff in treatment team meeting.  Patient's progress reviewed with nursing staff.      Crys Plasencia DO 02/02/25  Psychiatry Residency, PGY-2  This note has been constructed using a voice recognition system. There may be translation, syntax, or grammatical errors. If you have any questions, please contact the dictating author.'

## 2025-02-02 NOTE — ASSESSMENT & PLAN NOTE
"Patient continues to do well and the plan is to continue with the current treatment plan as mentioned below with no changes.    Patient continues to be pending group home placement. Otherwise no clinical significant change.  SW and pt cont to work on getting documentation to [facility] office and waiting for follow up from them.    Medication regimen as follows, no changes as of 2/2/2025:  Abilify 5 mg daily as mood adjunct   Zoloft 150 mg daily for depression and anxiety  Continue to encourage participation in group therapy, milieu therapy and occupational therapy.  Continue to assess for side effects of medications.  Continue collaboration with [facility] for medical co-morbidities as indicated.  Continue discussion with CM/SW to assist with obtaining collateral, disposition planning, and the implementation of patient-centered individualized plan of care.  Continue frequent safety checks and vitals per unit protocol.    Risks, benefits and possible side effects of Medications: Risks, benefits, and possible side effects of medications have previously been explained. No new medications at this time.      Legal status: 201    Disposition: to be determined, pending [facility] application for potential group home placement. OT Cognitive Evaluation completed: \"Pt would benefit from discharge to a supportive environment that can provide checks for safety and compliance with IADLs. \"   Although patient's mood has stabilized, they are currently awaiting group home placement.  Their ability to recognize safety hazards take medications and participate in IADLs are significantly impaired by their cognitive deficits compounded by their chronic mental health needs and would be at risk for significant decompensation without the structure and support of a group home setting.      No associated orders from this encounter found during lookback period of 72 hours.  "

## 2025-02-02 NOTE — NURSING NOTE
Pt is withdrawn to room. Out for meals and needs. Medication compliant. Denies SI/HI/AH/VH at this time. Denies any unmet needs or complaints at this time.

## 2025-02-03 PROCEDURE — 99232 SBSQ HOSP IP/OBS MODERATE 35: CPT | Performed by: PSYCHIATRY & NEUROLOGY

## 2025-02-03 RX ADMIN — Medication 2500 UNITS: at 08:14

## 2025-02-03 RX ADMIN — ARIPIPRAZOLE 5 MG: 5 TABLET ORAL at 08:14

## 2025-02-03 RX ADMIN — CYANOCOBALAMIN TAB 1000 MCG 1000 MCG: 1000 TAB at 08:14

## 2025-02-03 RX ADMIN — FAMOTIDINE 20 MG: 20 TABLET ORAL at 17:00

## 2025-02-03 RX ADMIN — LEVOTHYROXINE SODIUM 37.5 MCG: 0.07 TABLET ORAL at 06:04

## 2025-02-03 RX ADMIN — SENNOSIDES AND DOCUSATE SODIUM 2 TABLET: 50; 8.6 TABLET ORAL at 17:00

## 2025-02-03 RX ADMIN — SERTRALINE HYDROCHLORIDE 150 MG: 100 TABLET ORAL at 08:15

## 2025-02-03 RX ADMIN — ATORVASTATIN CALCIUM 40 MG: 40 TABLET, FILM COATED ORAL at 17:00

## 2025-02-03 RX ADMIN — FAMOTIDINE 20 MG: 20 TABLET ORAL at 08:14

## 2025-02-03 NOTE — PROGRESS NOTES
02/03/25 0838   Team Meeting   Meeting Type Daily Rounds   Team Members Present   Team Members Present Physician;Nurse;   Physician Team Member Ary   [facility] Noa   Patient/Family Present   Patient Present No   Patient's Family Present No     Pt is medication and meal compliant. Pt denies SI/HI/AVH. Pt was observed walking up and down the hallway. Pt's discharge is pending placement.

## 2025-02-03 NOTE — PROGRESS NOTES
"Progress Note - Behavioral Health   Name: Franco Roberson 39 y.o. male I MRN: [id number]  Unit/Bed#: -01 I Date of Admission: 5/14/2024   Date of Service: 2/3/2025 I Hospital Day: 265     Assessment & Plan  MDD (major depressive disorder), recurrent severe, without psychosis (HCC)  Patient continues to do well and the plan is to continue with the current treatment plan as mentioned below with no changes.    Patient continues to be pending group home placement. Otherwise no clinical significant change.  SW and pt cont to work on getting documentation to [facility] office and waiting for follow up from them.    Medication regimen as follows, no changes as of 2/3/2025:  Abilify 5 mg daily as mood adjunct   Zoloft 150 mg daily for depression and anxiety  Continue to encourage participation in group therapy, milieu therapy and occupational therapy.  Continue to assess for side effects of medications.  Continue collaboration with PATRICIA for medical co-morbidities as indicated.  Continue discussion with CM/SW to assist with obtaining collateral, disposition planning, and the implementation of patient-centered individualized plan of care.  Continue frequent safety checks and vitals per unit protocol.    Risks, benefits and possible side effects of Medications: Risks, benefits, and possible side effects of medications have previously been explained. No new medications at this time.      Legal status: 201    Disposition: to be determined, pending [facility] application for potential group home placement. OT Cognitive Evaluation completed: \"Pt would benefit from discharge to a supportive environment that can provide checks for safety and compliance with IADLs. \"   Although patient's mood has stabilized, they are currently awaiting group home placement.  Their ability to recognize safety hazards take medications and participate in IADLs are significantly impaired by their cognitive deficits compounded by their chronic mental health needs " and would be at risk for significant decompensation without the structure and support of a group home setting.      No associated orders from this encounter found during lookback period of 72 hours.  Autism spectrum disorder  Continue supportive care    No associated orders from this encounter found during lookback period of 72 hours.    Hyperlipidemia  - Lipitor 40 mg       Plan     Recommended Treatment:    - Encourage early mobility and having a structured day  - Provide frequent re-orientation, and cognitive stimulation  - Ensure assistive devices are in proper working order (eye-glasses, hearing aids)  - Encourage adequate hydration, nutrition and monitor bowel movements  - Maintain sleep-wake cycle: Uninterrupted sleep time; low-level lighting at night  - Fall precaution  - f/u [facility] recs regarding the medical problems   - Continue medication titration and treatment plan; adjust medication to optimize treatment response and as clinically indicated. .   - Observation: routine            - VS: as per unit protocol  - Diet: Regular diet  - Encourage group attendance and milieu therapy  - Dispo: To be determined     Scheduled medications:  Current Facility-Administered Medications   Medication Dose Route Frequency Provider Last Rate    acetaminophen  650 mg Oral Q6H PRN Basilio Brown MD      acetaminophen  650 mg Oral Q4H PRN Basilio Brown MD      acetaminophen  975 mg Oral Q6H PRN Basilio Brown MD      aluminum-magnesium hydroxide-simethicone  30 mL Oral Q4H PRN Basilio Brown MD      ARIPiprazole  5 mg Oral Daily Basilio Panda MD      Artificial Tears  1 drop Both Eyes Q3H PRN Basilio Brown MD      atorvastatin  40 mg Oral Daily With Dinner WALLY Baptiste      benztropine  1 mg Intramuscular Q4H PRN Max 6/day Basilio Brown MD      benztropine  1 mg Oral Q4H PRN Max 6/day Basilio Brown MD      Cholecalciferol  2,500 Units Oral Daily Dustin Mcallister MD       cyanocobalamin  1,000 mcg Oral Daily WALLY Baptiste      Diclofenac Sodium  2 g Topical 4x Daily PRN WALLY Baptiste      hydrOXYzine HCL  50 mg Oral Q6H PRN Max 4/day Basilio Brown MD      Or    diphenhydrAMINE  50 mg Intramuscular Q6H PRN Basilio Brown MD      diphenhydrAMINE-zinc acetate   Topical Daily PRN Raj Guerrero MD      famotidine  20 mg Oral BID LauraWALLY Tony      hydrOXYzine HCL  100 mg Oral Q6H PRN Max 4/day Basilio Brown MD      Or    LORazepam  2 mg Intramuscular Q6H PRN Basilio Brown MD      hydrOXYzine HCL  25 mg Oral Q6H PRN Max 4/day Basilio Brown MD      levothyroxine  37.5 mcg Oral Early Morning WALLY Baptiste      melatonin  6 mg Oral HS PRN WALLY Gomez      methocarbamol  500 mg Oral Q6H PRN WALLY Baptiste      OLANZapine  5 mg Oral Q4H PRN Max 3/day Basilio Brown MD      Or    OLANZapine  2.5 mg Intramuscular Q4H PRN Max 3/day Basilio Brown MD      OLANZapine  5 mg Oral Q3H PRN Max 3/day Basilio Brown MD      Or    OLANZapine  5 mg Intramuscular Q3H PRN Max 3/day Basilio Brown MD      OLANZapine  2.5 mg Oral Q4H PRN Max 6/day Basilio Brown MD      polyethylene glycol  17 g Oral Daily PRN Basilio Brown MD      propranolol  10 mg Oral Q8H PRN Basilio Brown MD      senna-docusate sodium  2 tablet Oral After Dinner Dutsin Mcallister MD      sertraline  150 mg Oral Daily WALLY Gomez      traZODone  50 mg Oral HS PRN Basilio Brown MD        PRN:    acetaminophen    acetaminophen    acetaminophen    aluminum-magnesium hydroxide-simethicone    Artificial Tears    benztropine    benztropine    Diclofenac Sodium    hydrOXYzine HCL **OR** diphenhydrAMINE    diphenhydrAMINE-zinc acetate    hydrOXYzine HCL **OR** LORazepam    hydrOXYzine HCL    melatonin    methocarbamol    OLANZapine **OR** OLANZapine    OLANZapine **OR**  "OLANZapine    OLANZapine    polyethylene glycol    propranolol    traZODone       Subjective     Patient was visited on unit for continuing care; chart reviewed and discussed with multidisciplinary treatment team.  On approach, the patient was calm and cooperative. Denied any changes in mood, appetite, and energy level. No problem initiating and maintaining sleep.  Denied A/VH currently.  Denied SI/HI, intent or plan upon direct inquiry at this time.    Patient continues to be visible in the milieu and interacts with staff and peers. No reports of aggression or self-injurious behavior on unit. No PRN medications used in the past 24 hours.    Patient accepted all offered medications and no adverse effects of medications noted or reported.    Objective    Current Mental Status Evaluation:  Appearance: casually dressed, consistent with stated age  Motor: no psychomotor retardation, no gait abnormalities  Behavior: cooperative, answers questions appropriately  Speech: soft, normal rhythm  Mood: \"good\"  Affect: blunted  Thought Process: linear and goal-oriented  Thought Content: denies delusions  Risk Potential: denies suicidal ideation, plan, or intent. Denies homicidal ideation  Perceptions: denies auditory hallucinations, denies visual hallucinations,   Sensorium: Oriented to person, place, time, and situation  Cognition: cognitive ability appears intact but was not quantitatively tested  Consciousness: alert and awake  Attention: intact, able to focus without difficulty  Insight: limited  Judgement: limited            Vital signs in last 24 hours:    Temp:  [96.5 °F (35.8 °C)-97 °F (36.1 °C)] 97 °F (36.1 °C)  HR:  [74-86] 74  BP: (107-119)/(68-75) 107/68  Resp:  [16] 16  SpO2:  [96 %-97 %] 97 %  O2 Device: None (Room air)    Psychiatric Review of Systems:  Medication adverse effects: none  Sleep: unchanged  Appetite: unchanged  Behaviors over the past 24 hours: unchanged    Laboratory results:    I have personally " reviewed all pertinent laboratory/tests results  No results found for this or any previous visit (from the past 48 hours).       Progress Toward Goals & Illness Status:   progressing, mood is stabilizing, discharge planning, placement pending    Patient is not at goal. They are not yet ready for discharge. The patient's condition currently requires active psychopharmacological medication management, interdisciplinary coordination with case management, and the utilization of adjunctive milieu and group therapy to augment psychopharmacological efficacy. The patient's risk of morbidity, and progression or decompensation of psychiatric disease, is higher without this current treatment.     Next of Kin  Extended Emergency Contact Information  Primary Emergency Contact: Lois Roberson  Address: [address]           [place] [id number] [place] [address] [place]  Home Phone: [phone]  Relation: Mother    Counseling / Coordination of Care  Patient's progress discussed with staff in treatment team meeting.  Medications, treatment progress and treatment plan reviewed with patient.  Medication education provided to patient.  Educated on importance of medication and treatment compliance.  Supportive therapy provided to patient.  Cognitive techniques utilized during the session.  Reassurance and supportive therapy provided.  Reoriented to reality and reassured.  Encouraged participation in milieu and group therapy on the unit.  Crisis/safety plan discussed with patient.       Dustin Mcallister MD  Attending Psychiatrist   [facility]      This note has been constructed using a voice recognition system. There may be translation, syntax, or grammatical errors. If you have any questions, please contact the dictating provider.

## 2025-02-03 NOTE — PLAN OF CARE
Pt intermittently attends groups. Pt typically quiet, but participates appropriately when prompted.

## 2025-02-03 NOTE — NURSING NOTE
Patient was sitting in his bed reading earlier this evening and as usual pleasant but interaction short.   He was withdrawn to himself for most of the evening, out of room only for needs. He denies S.I.H.I.A/H V/H

## 2025-02-03 NOTE — ASSESSMENT & PLAN NOTE
"Patient continues to do well and the plan is to continue with the current treatment plan as mentioned below with no changes.    Patient continues to be pending group home placement. Otherwise no clinical significant change.  SW and pt cont to work on getting documentation to [facility] office and waiting for follow up from them.    Medication regimen as follows, no changes as of 2/3/2025:  Abilify 5 mg daily as mood adjunct   Zoloft 150 mg daily for depression and anxiety  Continue to encourage participation in group therapy, milieu therapy and occupational therapy.  Continue to assess for side effects of medications.  Continue collaboration with [facility] for medical co-morbidities as indicated.  Continue discussion with CM/SW to assist with obtaining collateral, disposition planning, and the implementation of patient-centered individualized plan of care.  Continue frequent safety checks and vitals per unit protocol.    Risks, benefits and possible side effects of Medications: Risks, benefits, and possible side effects of medications have previously been explained. No new medications at this time.      Legal status: 201    Disposition: to be determined, pending [facility] application for potential group home placement. OT Cognitive Evaluation completed: \"Pt would benefit from discharge to a supportive environment that can provide checks for safety and compliance with IADLs. \"   Although patient's mood has stabilized, they are currently awaiting group home placement.  Their ability to recognize safety hazards take medications and participate in IADLs are significantly impaired by their cognitive deficits compounded by their chronic mental health needs and would be at risk for significant decompensation without the structure and support of a group home setting.      No associated orders from this encounter found during lookback period of 72 hours.  "

## 2025-02-03 NOTE — PLAN OF CARE
Problem: Ineffective Coping  Goal: Participates in unit activities  Description: Interventions:  - Provide therapeutic environment   - Provide required programming   - Redirect inappropriate behaviors   Outcome: Progressing     Problem: Depression  Goal: Treatment Goal: Demonstrate behavioral control of depressive symptoms, verbalize feelings of improved mood/affect, and adopt new coping skills prior to discharge  Outcome: Progressing  Goal: Verbalize thoughts and feelings  Description: Interventions:  - Assess and re-assess patient's level of risk   - Engage patient in 1:1 interactions, daily, for a minimum of 15 minutes   - Encourage patient to express feelings, fears, frustrations, hopes   Outcome: Progressing  Goal: Refrain from harming self  Description: Interventions:  - Monitor patient closely, per order   - Supervise medication ingestion, monitor effects and side effects   Outcome: Progressing  Goal: Refrain from isolation  Description: Interventions:  - Develop a trusting relationship   - Encourage socialization   Outcome: Progressing     Problem: Anxiety  Goal: Anxiety is at manageable level  Description: Interventions:  - Assess and monitor patient's anxiety level.   - Monitor for signs and symptoms (heart palpitations, chest pain, shortness of breath, headaches, nausea, feeling jumpy, restlessness, irritable, apprehensive).   - Collaborate with interdisciplinary team and initiate plan and interventions as ordered.  - [place] patient to unit/surroundings  - Explain treatment plan  - Encourage participation in care  - Encourage verbalization of concerns/fears  - Identify coping mechanisms  - Assist in developing anxiety-reducing skills  - Administer/offer alternative therapies  - Limit or eliminate stimulants  Outcome: Progressing     Problem: Knowledge Deficit  Goal: Patient/family/caregiver demonstrates understanding of disease process, treatment plan, medications, and discharge  instructions  Description: Complete learning assessment and assess knowledge base.  Interventions:  - Provide teaching at level of understanding  - Provide teaching via preferred learning methods  Outcome: Progressing     Problem: SLEEP DISTURBANCE  Goal: Will exhibit normal sleeping pattern  Description: Interventions:  -  Assess the patients sleep pattern, noting recent changes  - Administer medication as ordered  - Decrease environmental stimuli, including noise, as appropriate during the night  - Encourage the patient to actively participate in unit groups and or exercise during the day to enhance ability to achieve adequate sleep at night  - Assess the patient, in the morning, encouraging a description of sleep experience  Outcome: Progressing

## 2025-02-03 NOTE — NURSING NOTE
Patient has been seclusive to his room as of this time. Only out for his breakfast and lunch tray. Pleasant, calm and cooperative. Denies symptoms. Compliant with medications. Waiting placement.

## 2025-02-03 NOTE — PROGRESS NOTES
Pt and this writer had brief 1:1 engagement. Pt is seated upright in bed reading from his poems journal upon approach. Pt is pleasant. This writer inquired to Pt about how his weekend went and the lack of group attendance for the day. Pt stated that he was struggling with depressive symptoms over the last couple of days. This writer asked Pt if it would be helpful to him if she issued personal invitations to group this week. Pt was open to the idea. Pt is hopeful that he will feel a bit better tomorrow and hopes to attend programming.

## 2025-02-04 PROCEDURE — 99232 SBSQ HOSP IP/OBS MODERATE 35: CPT | Performed by: PSYCHIATRY & NEUROLOGY

## 2025-02-04 RX ADMIN — CYANOCOBALAMIN TAB 1000 MCG 1000 MCG: 1000 TAB at 08:18

## 2025-02-04 RX ADMIN — FAMOTIDINE 20 MG: 20 TABLET ORAL at 17:49

## 2025-02-04 RX ADMIN — Medication 2500 UNITS: at 08:18

## 2025-02-04 RX ADMIN — ARIPIPRAZOLE 5 MG: 5 TABLET ORAL at 08:18

## 2025-02-04 RX ADMIN — SENNOSIDES AND DOCUSATE SODIUM 2 TABLET: 50; 8.6 TABLET ORAL at 17:49

## 2025-02-04 RX ADMIN — LEVOTHYROXINE SODIUM 37.5 MCG: 0.07 TABLET ORAL at 05:59

## 2025-02-04 RX ADMIN — FAMOTIDINE 20 MG: 20 TABLET ORAL at 08:18

## 2025-02-04 RX ADMIN — ATORVASTATIN CALCIUM 40 MG: 40 TABLET, FILM COATED ORAL at 17:49

## 2025-02-04 RX ADMIN — SERTRALINE HYDROCHLORIDE 150 MG: 100 TABLET ORAL at 08:18

## 2025-02-04 NOTE — ASSESSMENT & PLAN NOTE
"Patient continues to do well and the plan is to continue with the current treatment plan as mentioned below with no changes.    Patient continues to be pending group home placement. Otherwise no clinical significant change.  SW and pt cont to work on getting documentation to [facility] office and waiting for follow up from them.    Medication regimen as follows, no changes as of 2/4/2025:  Abilify 5 mg daily as mood adjunct   Zoloft 150 mg daily for depression and anxiety  Continue to encourage participation in group therapy, milieu therapy and occupational therapy.  Continue to assess for side effects of medications.  Continue collaboration with [facility] for medical co-morbidities as indicated.  Continue discussion with CM/SW to assist with obtaining collateral, disposition planning, and the implementation of patient-centered individualized plan of care.  Continue frequent safety checks and vitals per unit protocol.    Risks, benefits and possible side effects of Medications: Risks, benefits, and possible side effects of medications have previously been explained. No new medications at this time.      Legal status: 201    Disposition: to be determined, pending [facility] application for potential group home placement. OT Cognitive Evaluation completed: \"Pt would benefit from discharge to a supportive environment that can provide checks for safety and compliance with IADLs. \"   Although patient's mood has stabilized, they are currently awaiting group home placement.  Their ability to recognize safety hazards take medications and participate in IADLs are significantly impaired by their cognitive deficits compounded by their chronic mental health needs and would be at risk for significant decompensation without the structure and support of a group home setting.      No associated orders from this encounter found during lookback period of 72 hours.  "

## 2025-02-04 NOTE — NURSING NOTE
Patient has been a bit more visible this shift. Interacting a bit with peer and doing laps throughout the [place] together. Continues to deny symptoms.Compliant with medications. Waiting placement.

## 2025-02-04 NOTE — NURSING NOTE
Patient secluded to room, appears withdrawn and depressed. Patient denies depression and SI/HI/AH/VH at this time. Compliant with medications and routine care.

## 2025-02-04 NOTE — PROGRESS NOTES
"Progress Note - Behavioral Health   Name: Franco Roberson 39 y.o. male I MRN: [id number]  Unit/Bed#: -01 I Date of Admission: 5/14/2024   Date of Service: 2/4/2025 I Hospital Day: 266     Assessment & Plan  MDD (major depressive disorder), recurrent severe, without psychosis (HCC)  Patient continues to do well and the plan is to continue with the current treatment plan as mentioned below with no changes.    Patient continues to be pending group home placement. Otherwise no clinical significant change.  SW and pt cont to work on getting documentation to [facility] office and waiting for follow up from them.    Medication regimen as follows, no changes as of 2/4/2025:  Abilify 5 mg daily as mood adjunct   Zoloft 150 mg daily for depression and anxiety  Continue to encourage participation in group therapy, milieu therapy and occupational therapy.  Continue to assess for side effects of medications.  Continue collaboration with PATRICIA for medical co-morbidities as indicated.  Continue discussion with CM/SW to assist with obtaining collateral, disposition planning, and the implementation of patient-centered individualized plan of care.  Continue frequent safety checks and vitals per unit protocol.    Risks, benefits and possible side effects of Medications: Risks, benefits, and possible side effects of medications have previously been explained. No new medications at this time.      Legal status: 201    Disposition: to be determined, pending [facility] application for potential group home placement. OT Cognitive Evaluation completed: \"Pt would benefit from discharge to a supportive environment that can provide checks for safety and compliance with IADLs. \"   Although patient's mood has stabilized, they are currently awaiting group home placement.  Their ability to recognize safety hazards take medications and participate in IADLs are significantly impaired by their cognitive deficits compounded by their chronic mental health needs " and would be at risk for significant decompensation without the structure and support of a group home setting.      No associated orders from this encounter found during lookback period of 72 hours.  Autism spectrum disorder  Continue supportive care    No associated orders from this encounter found during lookback period of 72 hours.    Hyperlipidemia  - Lipitor 40 mg       Plan     Recommended Treatment:    - Encourage early mobility and having a structured day  - Provide frequent re-orientation, and cognitive stimulation  - Ensure assistive devices are in proper working order (eye-glasses, hearing aids)  - Encourage adequate hydration, nutrition and monitor bowel movements  - Maintain sleep-wake cycle: Uninterrupted sleep time; low-level lighting at night  - Fall precaution  - f/u [facility] recs regarding the medical problems   - Continue medication titration and treatment plan; adjust medication to optimize treatment response and as clinically indicated. .   - Observation: routine            - VS: as per unit protocol  - Diet: Regular diet  - Encourage group attendance and milieu therapy  - Dispo: To be determined     Scheduled medications:  Current Facility-Administered Medications   Medication Dose Route Frequency Provider Last Rate    acetaminophen  650 mg Oral Q6H PRN Basilio Brown MD      acetaminophen  650 mg Oral Q4H PRN Basilio Brown MD      acetaminophen  975 mg Oral Q6H PRN Basilio Brown MD      aluminum-magnesium hydroxide-simethicone  30 mL Oral Q4H PRN Basilio Brown MD      ARIPiprazole  5 mg Oral Daily Basilio Panda MD      Artificial Tears  1 drop Both Eyes Q3H PRN Basilio Brown MD      atorvastatin  40 mg Oral Daily With Dinner WALLY Baptiste      benztropine  1 mg Intramuscular Q4H PRN Max 6/day Basilio Brown MD      benztropine  1 mg Oral Q4H PRN Max 6/day Basilio Brown MD      Cholecalciferol  2,500 Units Oral Daily Dustin Mcallister MD       cyanocobalamin  1,000 mcg Oral Daily WALLY Baptiste      Diclofenac Sodium  2 g Topical 4x Daily PRN WALLY Baptiste      hydrOXYzine HCL  50 mg Oral Q6H PRN Max 4/day Basilio rBown MD      Or    diphenhydrAMINE  50 mg Intramuscular Q6H PRN Basilio Brown MD      diphenhydrAMINE-zinc acetate   Topical Daily PRN Raj Guerrero MD      famotidine  20 mg Oral BID LauraWALLY Tony      hydrOXYzine HCL  100 mg Oral Q6H PRN Max 4/day Basilio Brown MD      Or    LORazepam  2 mg Intramuscular Q6H PRN Basilio Brown MD      hydrOXYzine HCL  25 mg Oral Q6H PRN Max 4/day Basilio Brown MD      levothyroxine  37.5 mcg Oral Early Morning WALLY Baptiste      melatonin  6 mg Oral HS PRN WALLY Gomez      methocarbamol  500 mg Oral Q6H PRN WALLY Baptiste      OLANZapine  5 mg Oral Q4H PRN Max 3/day Basilio Brown MD      Or    OLANZapine  2.5 mg Intramuscular Q4H PRN Max 3/day Basilio Brown MD      OLANZapine  5 mg Oral Q3H PRN Max 3/day Basilio Brown MD      Or    OLANZapine  5 mg Intramuscular Q3H PRN Max 3/day Basilio Brown MD      OLANZapine  2.5 mg Oral Q4H PRN Max 6/day Basilio Brown MD      polyethylene glycol  17 g Oral Daily PRN Basilio Brown MD      propranolol  10 mg Oral Q8H PRN Basilio Brown MD      senna-docusate sodium  2 tablet Oral After Dinner Dustin Mcallister MD      sertraline  150 mg Oral Daily WALLY Gomez      traZODone  50 mg Oral HS PRN Basilio Brown MD        PRN:    acetaminophen    acetaminophen    acetaminophen    aluminum-magnesium hydroxide-simethicone    Artificial Tears    benztropine    benztropine    Diclofenac Sodium    hydrOXYzine HCL **OR** diphenhydrAMINE    diphenhydrAMINE-zinc acetate    hydrOXYzine HCL **OR** LORazepam    hydrOXYzine HCL    melatonin    methocarbamol    OLANZapine **OR** OLANZapine    OLANZapine **OR**  "OLANZapine    OLANZapine    polyethylene glycol    propranolol    traZODone       Subjective     Patient was visited on unit for continuing care; chart reviewed and discussed with multidisciplinary treatment team.  On approach, the patient was calm and cooperative. Denied any changes in mood, appetite, and energy level. Therapy note reviewed yesterday. Pt denies any depression or anxiety at this time.  Expressed that he feels comfortable discussing with me if he notices any persistent worsening of his depressive symptoms.  No problem initiating and maintaining sleep.  Denied A/VH currently.  Denied SI/HI, intent or plan upon direct inquiry at this time.    Patient continues to be visible in the milieu and interacts with staff and peers. No reports of aggression or self-injurious behavior on unit. No PRN medications used in the past 24 hours.    Patient accepted all offered medications and no adverse effects of medications noted or reported.    Objective    Current Mental Status Evaluation:  Mental Status Exam  Appearance: casually dressed, consistent with stated age  Motor: no psychomotor retardation, no gait abnormalities  Behavior: cooperative, answers questions appropriately  Speech: soft, normal rhythm  Mood: \"fine\"  Affect: blunted  Thought Process: linear and goal-oriented  Thought Content: denies delusions or paranoia  Risk Potential: denies suicidal ideation, plan, or intent. Denies homicidal ideation  Perceptions: denies auditory hallucinations, denies visual hallucinations,   Sensorium: Oriented to person, place, time, and situation  Cognition: cognitive ability appears intact but was not quantitatively tested  Consciousness: alert and awake  Attention: intact, able to focus without difficulty  Insight: limited  Judgement: limited          Vital signs in last 24 hours:    Temp:  [97.2 °F (36.2 °C)-97.3 °F (36.3 °C)] 97.2 °F (36.2 °C)  HR:  [68-80] 68  BP: (110-116)/(62-66) 116/66  Resp:  [16] 16  SpO2:  " [96 %-98 %] 98 %  O2 Device: None (Room air)    Psychiatric Review of Systems:  Medication adverse effects: none  Sleep: unchanged  Appetite: unchanged  Behaviors over the past 24 hours: unchanged    Laboratory results:    I have personally reviewed all pertinent laboratory/tests results  No results found for this or any previous visit (from the past 48 hours).       Progress Toward Goals & Illness Status:   progressing, attends groups, discharge planning, placement pending    Patient is not at goal. They are not yet ready for discharge. The patient's condition currently requires active psychopharmacological medication management, interdisciplinary coordination with case management, and the utilization of adjunctive milieu and group therapy to augment psychopharmacological efficacy. The patient's risk of morbidity, and progression or decompensation of psychiatric disease, is higher without this current treatment.     Next of Kin  Extended Emergency Contact Information  Primary Emergency Contact: Lois Roberson  Address: [address] [place] [address] [place]           [address]  Home Phone: [phone]  Relation: Mother    Counseling / Coordination of Care  Patient's progress discussed with staff in treatment team meeting.  Medications, treatment progress and treatment plan reviewed with patient.  Medication education provided to patient.  Educated on importance of medication and treatment compliance.  Supportive therapy provided to patient.  Cognitive techniques utilized during the session.  Reassurance and supportive therapy provided.  Reoriented to reality and reassured.  Encouraged participation in milieu and group therapy on the unit.  Crisis/safety plan discussed with patient.       Dustin Mcallister MD  Attending Psychiatrist   [facility]      This note has been constructed using a voice recognition system. There may be translation, syntax, or grammatical  errors. If you have any questions, please contact the dictating provider.

## 2025-02-04 NOTE — PLAN OF CARE
Problem: Ineffective Coping  Goal: Participates in unit activities  Description: Interventions:  - Provide therapeutic environment   - Provide required programming   - Redirect inappropriate behaviors   Outcome: Progressing     Problem: Depression  Goal: Treatment Goal: Demonstrate behavioral control of depressive symptoms, verbalize feelings of improved mood/affect, and adopt new coping skills prior to discharge  Outcome: Progressing  Goal: Verbalize thoughts and feelings  Description: Interventions:  - Assess and re-assess patient's level of risk   - Engage patient in 1:1 interactions, daily, for a minimum of 15 minutes   - Encourage patient to express feelings, fears, frustrations, hopes   Outcome: Progressing  Goal: Refrain from harming self  Description: Interventions:  - Monitor patient closely, per order   - Supervise medication ingestion, monitor effects and side effects   Outcome: Progressing  Goal: Refrain from isolation  Description: Interventions:  - Develop a trusting relationship   - Encourage socialization   Outcome: Progressing     Problem: Anxiety  Goal: Anxiety is at manageable level  Description: Interventions:  - Assess and monitor patient's anxiety level.   - Monitor for signs and symptoms (heart palpitations, chest pain, shortness of breath, headaches, nausea, feeling jumpy, restlessness, irritable, apprehensive).   - Collaborate with interdisciplinary team and initiate plan and interventions as ordered.  - [place] patient to unit/surroundings  - Explain treatment plan  - Encourage participation in care  - Encourage verbalization of concerns/fears  - Identify coping mechanisms  - Assist in developing anxiety-reducing skills  - Administer/offer alternative therapies  - Limit or eliminate stimulants  Outcome: Progressing     Problem: DISCHARGE PLANNING - CARE MANAGEMENT  Goal: Discharge to post-acute care or home with appropriate resources  Description: INTERVENTIONS:  - Conduct assessment to  determine patient/family and health care team treatment goals, and need for post-acute services based on payer coverage, community resources, and patient preferences, and barriers to discharge  - Address psychosocial, clinical, and financial barriers to discharge as identified in assessment in conjunction with the patient/family and health care team  - Arrange appropriate level of post-acute services according to patient’s   needs and preference and payer coverage in collaboration with the physician and health care team  - Communicate with and update the patient/family, physician, and health care team regarding progress on the discharge plan  - Arrange appropriate transportation to post-acute venues  Outcome: Progressing     Problem: Knowledge Deficit  Goal: Patient/family/caregiver demonstrates understanding of disease process, treatment plan, medications, and discharge instructions  Description: Complete learning assessment and assess knowledge base.  Interventions:  - Provide teaching at level of understanding  - Provide teaching via preferred learning methods  Outcome: Progressing     Problem: SLEEP DISTURBANCE  Goal: Will exhibit normal sleeping pattern  Description: Interventions:  -  Assess the patients sleep pattern, noting recent changes  - Administer medication as ordered  - Decrease environmental stimuli, including noise, as appropriate during the night  - Encourage the patient to actively participate in unit groups and or exercise during the day to enhance ability to achieve adequate sleep at night  - Assess the patient, in the morning, encouraging a description of sleep experience  Outcome: Progressing

## 2025-02-04 NOTE — NURSING NOTE
Intermittently about the unit otherwise in bed resting. Denies symptoms. No complaints. Pleasant and cooperative. Compliant with medications. Waiting placement.    3 (mild pain)

## 2025-02-04 NOTE — PROGRESS NOTES
02/04/25 0840   Team Meeting   Meeting Type Daily Rounds   Team Members Present   Team Members Present Physician;Nurse;   Physician Team Member Ary   Nursing Team Member [facility] Team Member Suhas   Patient/Family Present   Patient Present No   Patient's Family Present No     Pt med/meal compliant. Visible on the unit. Pleasant, calm, cooperative. Discharge pending placement

## 2025-02-05 PROCEDURE — 99232 SBSQ HOSP IP/OBS MODERATE 35: CPT | Performed by: PSYCHIATRY & NEUROLOGY

## 2025-02-05 RX ADMIN — CYANOCOBALAMIN TAB 1000 MCG 1000 MCG: 1000 TAB at 08:12

## 2025-02-05 RX ADMIN — SENNOSIDES AND DOCUSATE SODIUM 2 TABLET: 50; 8.6 TABLET ORAL at 17:31

## 2025-02-05 RX ADMIN — ATORVASTATIN CALCIUM 40 MG: 40 TABLET, FILM COATED ORAL at 17:31

## 2025-02-05 RX ADMIN — FAMOTIDINE 20 MG: 20 TABLET ORAL at 17:31

## 2025-02-05 RX ADMIN — LEVOTHYROXINE SODIUM 37.5 MCG: 0.07 TABLET ORAL at 06:19

## 2025-02-05 RX ADMIN — Medication 2500 UNITS: at 08:12

## 2025-02-05 RX ADMIN — FAMOTIDINE 20 MG: 20 TABLET ORAL at 08:12

## 2025-02-05 RX ADMIN — ARIPIPRAZOLE 5 MG: 5 TABLET ORAL at 08:12

## 2025-02-05 RX ADMIN — SERTRALINE HYDROCHLORIDE 150 MG: 100 TABLET ORAL at 08:12

## 2025-02-05 NOTE — ASSESSMENT & PLAN NOTE
"Patient continues to do well and the plan is to continue with the current treatment plan as mentioned below with no changes.    Patient continues to be pending group home placement. Otherwise no clinical significant change.  SW and pt cont to work on getting documentation to [facility] office and waiting for follow up from them.    Medication regimen as follows, no changes as of 2/5/2025:  Abilify 5 mg daily as mood adjunct   Zoloft 150 mg daily for depression and anxiety  Continue to encourage participation in group therapy, milieu therapy and occupational therapy.  Continue to assess for side effects of medications.  Continue collaboration with [facility] for medical co-morbidities as indicated.  Continue discussion with CM/SW to assist with obtaining collateral, disposition planning, and the implementation of patient-centered individualized plan of care.  Continue frequent safety checks and vitals per unit protocol.    Risks, benefits and possible side effects of Medications: Risks, benefits, and possible side effects of medications have previously been explained. No new medications at this time.      Legal status: 201    Disposition: to be determined, pending [facility] application for potential group home placement. OT Cognitive Evaluation completed: \"Pt would benefit from discharge to a supportive environment that can provide checks for safety and compliance with IADLs. \"   Although patient's mood has stabilized, they are currently awaiting group home placement.  Their ability to recognize safety hazards take medications and participate in IADLs are significantly impaired by their cognitive deficits compounded by their chronic mental health needs and would be at risk for significant decompensation without the structure and support of a group home setting.      No associated orders from this encounter found during lookback period of 72 hours.  " Patient discharged home. Provided discharge instructions and prescriptions. Patient removed his own IV, safe keeping returned.  Left unit escorted by security.

## 2025-02-05 NOTE — PLAN OF CARE
Problem: Ineffective Coping  Goal: Participates in unit activities  Description: Interventions:  - Provide therapeutic environment   - Provide required programming   - Redirect inappropriate behaviors   Outcome: Progressing     Problem: Depression  Goal: Treatment Goal: Demonstrate behavioral control of depressive symptoms, verbalize feelings of improved mood/affect, and adopt new coping skills prior to discharge  Outcome: Progressing  Goal: Verbalize thoughts and feelings  Description: Interventions:  - Assess and re-assess patient's level of risk   - Engage patient in 1:1 interactions, daily, for a minimum of 15 minutes   - Encourage patient to express feelings, fears, frustrations, hopes   Outcome: Progressing  Goal: Refrain from harming self  Description: Interventions:  - Monitor patient closely, per order   - Supervise medication ingestion, monitor effects and side effects   Outcome: Progressing  Goal: Refrain from isolation  Description: Interventions:  - Develop a trusting relationship   - Encourage socialization   Outcome: Progressing     Problem: Anxiety  Goal: Anxiety is at manageable level  Description: Interventions:  - Assess and monitor patient's anxiety level.   - Monitor for signs and symptoms (heart palpitations, chest pain, shortness of breath, headaches, nausea, feeling jumpy, restlessness, irritable, apprehensive).   - Collaborate with interdisciplinary team and initiate plan and interventions as ordered.  - Eunice patient to unit/surroundings  - Explain treatment plan  - Encourage participation in care  - Encourage verbalization of concerns/fears  - Identify coping mechanisms  - Assist in developing anxiety-reducing skills  - Administer/offer alternative therapies  - Limit or eliminate stimulants  Outcome: Progressing     Problem: DISCHARGE PLANNING - CARE MANAGEMENT  Goal: Discharge to post-acute care or home with appropriate resources  Description: INTERVENTIONS:  - Conduct assessment to  determine patient/family and health care team treatment goals, and need for post-acute services based on payer coverage, community resources, and patient preferences, and barriers to discharge  - Address psychosocial, clinical, and financial barriers to discharge as identified in assessment in conjunction with the patient/family and health care team  - Arrange appropriate level of post-acute services according to patient’s   needs and preference and payer coverage in collaboration with the physician and health care team  - Communicate with and update the patient/family, physician, and health care team regarding progress on the discharge plan  - Arrange appropriate transportation to post-acute venues  Outcome: Progressing     Problem: Knowledge Deficit  Goal: Patient/family/caregiver demonstrates understanding of disease process, treatment plan, medications, and discharge instructions  Description: Complete learning assessment and assess knowledge base.  Interventions:  - Provide teaching at level of understanding  - Provide teaching via preferred learning methods  Outcome: Progressing     Problem: SLEEP DISTURBANCE  Goal: Will exhibit normal sleeping pattern  Description: Interventions:  -  Assess the patients sleep pattern, noting recent changes  - Administer medication as ordered  - Decrease environmental stimuli, including noise, as appropriate during the night  - Encourage the patient to actively participate in unit groups and or exercise during the day to enhance ability to achieve adequate sleep at night  - Assess the patient, in the morning, encouraging a description of sleep experience  Outcome: Progressing

## 2025-02-05 NOTE — NURSING NOTE
Seclusive to room as of this time. Only out for meal trays. Pleasant, cooperative. Denies symptoms. Waiting placement.

## 2025-02-05 NOTE — PROGRESS NOTES
02/05/25 0840   Team Meeting   Meeting Type Daily Rounds   Team Members Present   Team Members Present Physician;;Nurse   Physician Team Member Ary   [facility] Noa   Patient/Family Present   Patient Present No   Patient's Family Present No     Pt is medication and meal compliant. Pt denies SI/HI/AVH. Pt is calm and cooperative. Pt is social with select peers.

## 2025-02-05 NOTE — PROGRESS NOTES
"Progress Note - Behavioral Health   Name: Franco Robreson 39 y.o. male I MRN: [id number]  Unit/Bed#: -01 I Date of Admission: 5/14/2024   Date of Service: 2/5/2025 I Hospital Day: 267     Assessment & Plan  MDD (major depressive disorder), recurrent severe, without psychosis (HCC)  Patient continues to do well and the plan is to continue with the current treatment plan as mentioned below with no changes.    Patient continues to be pending group home placement. Otherwise no clinical significant change.  SW and pt cont to work on getting documentation to [facility] office and waiting for follow up from them.    Medication regimen as follows, no changes as of 2/5/2025:  Abilify 5 mg daily as mood adjunct   Zoloft 150 mg daily for depression and anxiety  Continue to encourage participation in group therapy, milieu therapy and occupational therapy.  Continue to assess for side effects of medications.  Continue collaboration with PATRICIA for medical co-morbidities as indicated.  Continue discussion with CM/SW to assist with obtaining collateral, disposition planning, and the implementation of patient-centered individualized plan of care.  Continue frequent safety checks and vitals per unit protocol.    Risks, benefits and possible side effects of Medications: Risks, benefits, and possible side effects of medications have previously been explained. No new medications at this time.      Legal status: 201    Disposition: to be determined, pending SOAR application for potential group home placement. OT Cognitive Evaluation completed: \"Pt would benefit from discharge to a supportive environment that can provide checks for safety and compliance with IADLs. \"   Although patient's mood has stabilized, they are currently awaiting group home placement.  Their ability to recognize safety hazards take medications and participate in IADLs are significantly impaired by their cognitive deficits compounded by their chronic mental health needs " and would be at risk for significant decompensation without the structure and support of a group home setting.      No associated orders from this encounter found during lookback period of 72 hours.  Autism spectrum disorder  Continue supportive care    No associated orders from this encounter found during lookback period of 72 hours.    Hyperlipidemia  - Lipitor 40 mg       Plan     Recommended Treatment:    - Encourage early mobility and having a structured day  - Provide frequent re-orientation, and cognitive stimulation  - Ensure assistive devices are in proper working order (eye-glasses, hearing aids)  - Encourage adequate hydration, nutrition and monitor bowel movements  - Maintain sleep-wake cycle: Uninterrupted sleep time; low-level lighting at night  - Fall precaution  - f/u [facility] recs regarding the medical problems   - Continue medication titration and treatment plan; adjust medication to optimize treatment response and as clinically indicated. .   - Observation: routine            - VS: as per unit protocol  - Diet: Regular diet  - Encourage group attendance and milieu therapy  - Dispo: To be determined     Scheduled medications:  Current Facility-Administered Medications   Medication Dose Route Frequency Provider Last Rate    acetaminophen  650 mg Oral Q6H PRN Basilio Brown MD      acetaminophen  650 mg Oral Q4H PRN Basilio Brown MD      acetaminophen  975 mg Oral Q6H PRN Basilio Brown MD      aluminum-magnesium hydroxide-simethicone  30 mL Oral Q4H PRN Basilio Brown MD      ARIPiprazole  5 mg Oral Daily Basilio Panda MD      Artificial Tears  1 drop Both Eyes Q3H PRN Basilio Brown MD      atorvastatin  40 mg Oral Daily With Dinner WALLY Baptiste      benztropine  1 mg Intramuscular Q4H PRN Max 6/day Basilio Brown MD      benztropine  1 mg Oral Q4H PRN Max 6/day Basilio Brown MD      Cholecalciferol  2,500 Units Oral Daily Dustin Mcallister MD       cyanocobalamin  1,000 mcg Oral Daily WALLY Baptiste      Diclofenac Sodium  2 g Topical 4x Daily PRN WALLY Baptiste      hydrOXYzine HCL  50 mg Oral Q6H PRN Max 4/day Basilio Brown MD      Or    diphenhydrAMINE  50 mg Intramuscular Q6H PRN Basilio Brown MD      diphenhydrAMINE-zinc acetate   Topical Daily PRN Raj Guerrero MD      famotidine  20 mg Oral BID LauraWALLY Tony      hydrOXYzine HCL  100 mg Oral Q6H PRN Max 4/day Basilio Brown MD      Or    LORazepam  2 mg Intramuscular Q6H PRN Basilio Brown MD      hydrOXYzine HCL  25 mg Oral Q6H PRN Max 4/day Basilio Brown MD      levothyroxine  37.5 mcg Oral Early Morning WALLY Baptiste      melatonin  6 mg Oral HS PRN WALLY Gomez      methocarbamol  500 mg Oral Q6H PRN WALLY Baptiste      OLANZapine  5 mg Oral Q4H PRN Max 3/day Basilio Brown MD      Or    OLANZapine  2.5 mg Intramuscular Q4H PRN Max 3/day Basilio Brown MD      OLANZapine  5 mg Oral Q3H PRN Max 3/day Basilio Brown MD      Or    OLANZapine  5 mg Intramuscular Q3H PRN Max 3/day Basilio Brown MD      OLANZapine  2.5 mg Oral Q4H PRN Max 6/day Basilio Brown MD      polyethylene glycol  17 g Oral Daily PRN Basilio Brown MD      propranolol  10 mg Oral Q8H PRN Basilio Brown MD      senna-docusate sodium  2 tablet Oral After Dinner Dustin Mcallister MD      sertraline  150 mg Oral Daily WALLY Gomez      traZODone  50 mg Oral HS PRN Basilio Brown MD        PRN:    acetaminophen    acetaminophen    acetaminophen    aluminum-magnesium hydroxide-simethicone    Artificial Tears    benztropine    benztropine    Diclofenac Sodium    hydrOXYzine HCL **OR** diphenhydrAMINE    diphenhydrAMINE-zinc acetate    hydrOXYzine HCL **OR** LORazepam    hydrOXYzine HCL    melatonin    methocarbamol    OLANZapine **OR** OLANZapine    OLANZapine **OR**  "OLANZapine    OLANZapine    polyethylene glycol    propranolol    traZODone       Subjective     Patient was visited on unit for continuing care; chart reviewed and discussed with multidisciplinary treatment team.  On approach, the patient was calm and cooperative. Denied any changes in mood, appetite, and energy level. No problem initiating and maintaining sleep.  Denied A/VH currently.  Denied SI/HI, intent or plan upon direct inquiry at this time.    Patient continues to be visible in the milieu and interacts with staff and peers. No reports of aggression or self-injurious behavior on unit. No PRN medications used in the past 24 hours.    Patient accepted all offered medications and no adverse effects of medications noted or reported.    Objective    Current Mental Status Evaluation:  Mental Status Exam  Appearance: casually dressed, consistent with stated age  Motor: no psychomotor retardation, no gait abnormalities  Behavior: cooperative, answers questions appropriately  Speech: soft, normal rhythm  Mood: \"ok\"  Affect: blunted  Thought Process: linear and goal-oriented  Thought Content: denies delusions  Risk Potential: denies suicidal ideation, plan, or intent. Denies homicidal ideation  Perceptions: denies auditory hallucinations, denies visual hallucinations,   Sensorium: Oriented to person, place, time, and situation  Cognition: cognitive ability appears intact but was not quantitatively tested  Consciousness: alert and awake  Attention: intact, able to focus without difficulty  Insight: limited  Judgement: limited            Vital signs in last 24 hours:    Temp:  [97 °F (36.1 °C)-97.1 °F (36.2 °C)] 97 °F (36.1 °C)  HR:  [68-82] 68  BP: ()/(64-76) 98/64  Resp:  [16] 16  SpO2:  [95 %-98 %] 98 %  O2 Device: None (Room air)    Psychiatric Review of Systems:  Medication adverse effects: none  Sleep: unchanged  Appetite: unchanged  Behaviors over the past 24 hours: unchanged    Laboratory results:    I " have personally reviewed all pertinent laboratory/tests results  No results found for this or any previous visit (from the past 48 hours).       Progress Toward Goals & Illness Status:   progressing, attends groups, participates in milieu therapy, discharge planning, placement pending    Patient is not at goal. They are not yet ready for discharge. The patient's condition currently requires active psychopharmacological medication management, interdisciplinary coordination with case management, and the utilization of adjunctive milieu and group therapy to augment psychopharmacological efficacy. The patient's risk of morbidity, and progression or decompensation of psychiatric disease, is higher without this current treatment.     Next of Kin  Extended Emergency Contact Information  Primary Emergency Contact: Lois Roberson  Address: [address] [place] [address] of [facility]  Home Phone: [phone]  Relation: Mother    Counseling / Coordination of Care  Patient's progress discussed with staff in treatment team meeting.  Medications, treatment progress and treatment plan reviewed with patient.  Medication education provided to patient.  Educated on importance of medication and treatment compliance.  Supportive therapy provided to patient.  Cognitive techniques utilized during the session.  Reassurance and supportive therapy provided.  Reoriented to reality and reassured.  Encouraged participation in milieu and group therapy on the unit.  Crisis/safety plan discussed with patient.       Dustin Mcallister MD  Attending Psychiatrist   [facility]      This note has been constructed using a voice recognition system. There may be translation, syntax, or grammatical errors. If you have any questions, please contact the dictating provider.

## 2025-02-05 NOTE — NURSING NOTE
Patient visible for short periods of time, appears withdrawn and depressed. Encouraged to attend group with success. Patient denies Depression SI/HI/AH/VH at this time. Compliant with medications and routine vitals.

## 2025-02-05 NOTE — ASSESSMENT & PLAN NOTE
Continue supportive care    No associated orders from this encounter found during lookback period of 72 hours.     Rx Refill Request Telephone Encounter    Name:  Halina Jimenez  :  [id number]  Medication Name:  Nortrel  Dose : 1 tab  Route : by mouth  Frequency : daily    Specific Pharmacy location:  [facility]  Date of last appointment:    Date of next appointment:    Best number to reach patient:   [phone]

## 2025-02-06 PROCEDURE — 99232 SBSQ HOSP IP/OBS MODERATE 35: CPT | Performed by: PSYCHIATRY & NEUROLOGY

## 2025-02-06 RX ADMIN — FAMOTIDINE 20 MG: 20 TABLET ORAL at 08:22

## 2025-02-06 RX ADMIN — Medication 2500 UNITS: at 08:22

## 2025-02-06 RX ADMIN — SENNOSIDES AND DOCUSATE SODIUM 2 TABLET: 50; 8.6 TABLET ORAL at 17:08

## 2025-02-06 RX ADMIN — LEVOTHYROXINE SODIUM 37.5 MCG: 0.07 TABLET ORAL at 06:36

## 2025-02-06 RX ADMIN — SERTRALINE HYDROCHLORIDE 150 MG: 100 TABLET ORAL at 08:22

## 2025-02-06 RX ADMIN — ATORVASTATIN CALCIUM 40 MG: 40 TABLET, FILM COATED ORAL at 17:09

## 2025-02-06 RX ADMIN — CYANOCOBALAMIN TAB 1000 MCG 1000 MCG: 1000 TAB at 08:22

## 2025-02-06 RX ADMIN — FAMOTIDINE 20 MG: 20 TABLET ORAL at 17:08

## 2025-02-06 RX ADMIN — ARIPIPRAZOLE 5 MG: 5 TABLET ORAL at 08:22

## 2025-02-06 NOTE — PLAN OF CARE
Problem: Ineffective Coping  Goal: Participates in unit activities  Description: Interventions:  - Provide therapeutic environment   - Provide required programming   - Redirect inappropriate behaviors   Outcome: Progressing     Problem: Depression  Goal: Treatment Goal: Demonstrate behavioral control of depressive symptoms, verbalize feelings of improved mood/affect, and adopt new coping skills prior to discharge  Outcome: Progressing  Goal: Verbalize thoughts and feelings  Description: Interventions:  - Assess and re-assess patient's level of risk   - Engage patient in 1:1 interactions, daily, for a minimum of 15 minutes   - Encourage patient to express feelings, fears, frustrations, hopes   Outcome: Progressing  Goal: Refrain from harming self  Description: Interventions:  - Monitor patient closely, per order   - Supervise medication ingestion, monitor effects and side effects   Outcome: Progressing  Goal: Refrain from isolation  Description: Interventions:  - Develop a trusting relationship   - Encourage socialization   Outcome: Progressing     Problem: Anxiety  Goal: Anxiety is at manageable level  Description: Interventions:  - Assess and monitor patient's anxiety level.   - Monitor for signs and symptoms (heart palpitations, chest pain, shortness of breath, headaches, nausea, feeling jumpy, restlessness, irritable, apprehensive).   - Collaborate with interdisciplinary team and initiate plan and interventions as ordered.  - Terrell patient to unit/surroundings  - Explain treatment plan  - Encourage participation in care  - Encourage verbalization of concerns/fears  - Identify coping mechanisms  - Assist in developing anxiety-reducing skills  - Administer/offer alternative therapies  - Limit or eliminate stimulants  Outcome: Progressing     Problem: Knowledge Deficit  Goal: Patient/family/caregiver demonstrates understanding of disease process, treatment plan, medications, and discharge  instructions  Description: Complete learning assessment and assess knowledge base.  Interventions:  - Provide teaching at level of understanding  - Provide teaching via preferred learning methods  Outcome: Progressing     Problem: SLEEP DISTURBANCE  Goal: Will exhibit normal sleeping pattern  Description: Interventions:  -  Assess the patients sleep pattern, noting recent changes  - Administer medication as ordered  - Decrease environmental stimuli, including noise, as appropriate during the night  - Encourage the patient to actively participate in unit groups and or exercise during the day to enhance ability to achieve adequate sleep at night  - Assess the patient, in the morning, encouraging a description of sleep experience  Outcome: Progressing

## 2025-02-06 NOTE — NURSING NOTE
Patient visible on the unit, social with certain peers. Patient appears to be in good spirits. Denies SI/HI/AH/VH ,compliant with medications and routine vitals.

## 2025-02-06 NOTE — PROGRESS NOTES
"Progress Note - Behavioral Health   Name: Franco Roberson 39 y.o. male I MRN: [id number]  Unit/Bed#: -01 I Date of Admission: 5/14/2024   Date of Service: 2/6/2025 I Hospital Day: 268     Assessment & Plan  MDD (major depressive disorder), recurrent severe, without psychosis (HCC)  Patient continues to do well and the plan is to continue with the current treatment plan as mentioned below with no changes.    Patient continues to be pending group home placement. Otherwise no clinical significant change.  SW and pt cont to work on getting documentation to [facility] office and waiting for follow up from them.    Medication regimen as follows, no changes as of 2/6/2025:  Abilify 5 mg daily as mood adjunct   Zoloft 150 mg daily for depression and anxiety  Continue to encourage participation in group therapy, milieu therapy and occupational therapy.  Continue to assess for side effects of medications.  Continue collaboration with PATRICIA for medical co-morbidities as indicated.  Continue discussion with CM/SW to assist with obtaining collateral, disposition planning, and the implementation of patient-centered individualized plan of care.  Continue frequent safety checks and vitals per unit protocol.    Risks, benefits and possible side effects of Medications: Risks, benefits, and possible side effects of medications have previously been explained. No new medications at this time.      Legal status: 201    Disposition: to be determined, pending [facility] application for potential group home placement. OT Cognitive Evaluation completed: \"Pt would benefit from discharge to a supportive environment that can provide checks for safety and compliance with IADLs. \"   Although patient's mood has stabilized, they are currently awaiting group home placement.  Their ability to recognize safety hazards take medications and participate in IADLs are significantly impaired by their cognitive deficits compounded by their chronic mental health needs " and would be at risk for significant decompensation without the structure and support of a group home setting.      No associated orders from this encounter found during lookback period of 72 hours.  Autism spectrum disorder  Continue supportive care    No associated orders from this encounter found during lookback period of 72 hours.    Hyperlipidemia  - Lipitor 40 mg       Plan     Recommended Treatment:    - Encourage early mobility and having a structured day  - Provide frequent re-orientation, and cognitive stimulation  - Ensure assistive devices are in proper working order (eye-glasses, hearing aids)  - Encourage adequate hydration, nutrition and monitor bowel movements  - Maintain sleep-wake cycle: Uninterrupted sleep time; low-level lighting at night  - Fall precaution  - f/u [facility] recs regarding the medical problems   - Continue medication titration and treatment plan; adjust medication to optimize treatment response and as clinically indicated. .   - Observation: routine            - VS: as per unit protocol  - Diet: Regular diet  - Encourage group attendance and milieu therapy  - Dispo: To be determined     Scheduled medications:  Current Facility-Administered Medications   Medication Dose Route Frequency Provider Last Rate    acetaminophen  650 mg Oral Q6H PRN Basilio rBown MD      acetaminophen  650 mg Oral Q4H PRN Basilio Brown MD      acetaminophen  975 mg Oral Q6H PRN Basilio Brown MD      aluminum-magnesium hydroxide-simethicone  30 mL Oral Q4H PRN Basilio Brown MD      ARIPiprazole  5 mg Oral Daily Basilio Panda MD      Artificial Tears  1 drop Both Eyes Q3H PRN Basilio Brown MD      atorvastatin  40 mg Oral Daily With Dinner WALLY Baptiste      benztropine  1 mg Intramuscular Q4H PRN Max 6/day Basilio Brown MD      benztropine  1 mg Oral Q4H PRN Max 6/day Basilio Brown MD      Cholecalciferol  2,500 Units Oral Daily Dustin Mcallister MD       cyanocobalamin  1,000 mcg Oral Daily WALLY Baptiste      Diclofenac Sodium  2 g Topical 4x Daily PRN WALLY Baptiste      hydrOXYzine HCL  50 mg Oral Q6H PRN Max 4/day Basilio Brown MD      Or    diphenhydrAMINE  50 mg Intramuscular Q6H PRN Basilio Brown MD      diphenhydrAMINE-zinc acetate   Topical Daily PRN Raj Guerrero MD      famotidine  20 mg Oral BID LauraWALLY Tony      hydrOXYzine HCL  100 mg Oral Q6H PRN Max 4/day Basilio Brown MD      Or    LORazepam  2 mg Intramuscular Q6H PRN Basilio Brown MD      hydrOXYzine HCL  25 mg Oral Q6H PRN Max 4/day Basilio Brown MD      levothyroxine  37.5 mcg Oral Early Morning WALLY Baptiste      melatonin  6 mg Oral HS PRN WALLY Gomez      methocarbamol  500 mg Oral Q6H PRN WALLY Baptiste      OLANZapine  5 mg Oral Q4H PRN Max 3/day Basilio Brown MD      Or    OLANZapine  2.5 mg Intramuscular Q4H PRN Max 3/day Basilio Brown MD      OLANZapine  5 mg Oral Q3H PRN Max 3/day Basilio Brown MD      Or    OLANZapine  5 mg Intramuscular Q3H PRN Max 3/day Basilio Brown MD      OLANZapine  2.5 mg Oral Q4H PRN Max 6/day Basilio Brown MD      polyethylene glycol  17 g Oral Daily PRN Basilio Brown MD      propranolol  10 mg Oral Q8H PRN Basilio Brown MD      senna-docusate sodium  2 tablet Oral After Dinner Dustin Mcallister MD      sertraline  150 mg Oral Daily WALLY Gomez      traZODone  50 mg Oral HS PRN Basilio Brown MD        PRN:    acetaminophen    acetaminophen    acetaminophen    aluminum-magnesium hydroxide-simethicone    Artificial Tears    benztropine    benztropine    Diclofenac Sodium    hydrOXYzine HCL **OR** diphenhydrAMINE    diphenhydrAMINE-zinc acetate    hydrOXYzine HCL **OR** LORazepam    hydrOXYzine HCL    melatonin    methocarbamol    OLANZapine **OR** OLANZapine    OLANZapine **OR**  "OLANZapine    OLANZapine    polyethylene glycol    propranolol    traZODone       Subjective     Patient was visited on unit for continuing care; chart reviewed and discussed with multidisciplinary treatment team.  On approach, the patient was calm and cooperative. Denied any changes in mood, appetite, and energy level. No problem initiating and maintaining sleep.  Denied A/VH currently.  Denied SI/HI, intent or plan upon direct inquiry at this time.    Patient continues to be visible in the milieu and interacts with staff and peers. No reports of aggression or self-injurious behavior on unit. No PRN medications used in the past 24 hours.    Patient accepted all offered medications and no adverse effects of medications noted or reported.    Objective    Current Mental Status Evaluation:  Mental Status Exam  Appearance: casually dressed, consistent with stated age  Motor: no psychomotor retardation, no gait abnormalities  Behavior: cooperative, answers questions appropriately  Speech: soft, normal rhythm  Mood: \"good\"  Affect: blunted  Thought Process: linear and goal-oriented  Thought Content: denies delusions  Risk Potential: denies suicidal ideation, plan, or intent. Denies homicidal ideation  Perceptions: denies auditory hallucinations, denies visual hallucinations,   Sensorium: Oriented to person, place, time, and situation  Cognition: cognitive ability appears intact but was not quantitatively tested  Consciousness: alert and awake  Attention: intact, able to focus without difficulty  Insight: limited  Judgement: limited            Vital signs in last 24 hours:    Temp:  [97 °F (36.1 °C)-97.5 °F (36.4 °C)] 97 °F (36.1 °C)  HR:  [63-72] 63  BP: (108-117)/(64-68) 108/68  Resp:  [16] 16  SpO2:  [97 %-98 %] 98 %  O2 Device: None (Room air)    Psychiatric Review of Systems:  Medication adverse effects: none  Sleep: unchanged  Appetite: unchanged  Behaviors over the past 24 hours: unchanged    Laboratory results:  "   I have personally reviewed all pertinent laboratory/tests results  No results found for this or any previous visit (from the past 48 hours).       Progress Toward Goals & Illness Status:   progressing, mood is stabilizing, working on coping skills, discharge planning    Patient is not at goal. They are not yet ready for discharge. The patient's condition currently requires active psychopharmacological medication management, interdisciplinary coordination with case management, and the utilization of adjunctive milieu and group therapy to augment psychopharmacological efficacy. The patient's risk of morbidity, and progression or decompensation of psychiatric disease, is higher without this current treatment.     Next of Kin  Extended Emergency Contact Information  Primary Emergency Contact: Lois Roberson  Address: [address] [place] [address] [place]           [address] [place]  Home Phone: [phone]  Relation: Mother    Counseling / Coordination of Care  Patient's progress discussed with staff in treatment team meeting.  Medications, treatment progress and treatment plan reviewed with patient.  Medication education provided to patient.  Educated on importance of medication and treatment compliance.  Supportive therapy provided to patient.  Cognitive techniques utilized during the session.  Reassurance and supportive therapy provided.  Reoriented to reality and reassured.  Encouraged participation in milieu and group therapy on the unit.  Crisis/safety plan discussed with patient.       Dustin Mcallister MD  Attending Psychiatrist   [facility]      This note has been constructed using a voice recognition system. There may be translation, syntax, or grammatical errors. If you have any questions, please contact the dictating provider.     Priscilla Best

## 2025-02-06 NOTE — NURSING NOTE
More visible today than yesterday. Currently attending group. Shaved per his request at the start of the shift. Compliant with meds. Denies symptoms. Waiting placement.

## 2025-02-06 NOTE — PROGRESS NOTES
02/06/25 0839   Team Meeting   Meeting Type Daily Rounds   Team Members Present   Team Members Present Nurse;Physician;   Physician Team Member Ary   [facility] Noa   Patient/Family Present   Patient Present No   Patient's Family Present No     Pt is medication and meal compliant. Pt denies SI/HI/AVH. Pt is social with select peers. Pt was visible on the unit walking in the evening time. Pt's discharge is pending placement.

## 2025-02-06 NOTE — ASSESSMENT & PLAN NOTE
"Patient continues to do well and the plan is to continue with the current treatment plan as mentioned below with no changes.    Patient continues to be pending group home placement. Otherwise no clinical significant change.  SW and pt cont to work on getting documentation to [facility] office and waiting for follow up from them.    Medication regimen as follows, no changes as of 2/6/2025:  Abilify 5 mg daily as mood adjunct   Zoloft 150 mg daily for depression and anxiety  Continue to encourage participation in group therapy, milieu therapy and occupational therapy.  Continue to assess for side effects of medications.  Continue collaboration with [facility] for medical co-morbidities as indicated.  Continue discussion with CM/SW to assist with obtaining collateral, disposition planning, and the implementation of patient-centered individualized plan of care.  Continue frequent safety checks and vitals per unit protocol.    Risks, benefits and possible side effects of Medications: Risks, benefits, and possible side effects of medications have previously been explained. No new medications at this time.      Legal status: 201    Disposition: to be determined, pending [facility] application for potential group home placement. OT Cognitive Evaluation completed: \"Pt would benefit from discharge to a supportive environment that can provide checks for safety and compliance with IADLs. \"   Although patient's mood has stabilized, they are currently awaiting group home placement.  Their ability to recognize safety hazards take medications and participate in IADLs are significantly impaired by their cognitive deficits compounded by their chronic mental health needs and would be at risk for significant decompensation without the structure and support of a group home setting.      No associated orders from this encounter found during lookback period of 72 hours.  "

## 2025-02-07 PROCEDURE — 99232 SBSQ HOSP IP/OBS MODERATE 35: CPT | Performed by: PSYCHIATRY & NEUROLOGY

## 2025-02-07 RX ADMIN — FAMOTIDINE 20 MG: 20 TABLET ORAL at 08:18

## 2025-02-07 RX ADMIN — ATORVASTATIN CALCIUM 40 MG: 40 TABLET, FILM COATED ORAL at 17:23

## 2025-02-07 RX ADMIN — SENNOSIDES AND DOCUSATE SODIUM 2 TABLET: 50; 8.6 TABLET ORAL at 17:23

## 2025-02-07 RX ADMIN — SERTRALINE HYDROCHLORIDE 150 MG: 100 TABLET ORAL at 08:17

## 2025-02-07 RX ADMIN — LEVOTHYROXINE SODIUM 37.5 MCG: 0.07 TABLET ORAL at 06:50

## 2025-02-07 RX ADMIN — Medication 2500 UNITS: at 08:17

## 2025-02-07 RX ADMIN — CYANOCOBALAMIN TAB 1000 MCG 1000 MCG: 1000 TAB at 08:18

## 2025-02-07 RX ADMIN — FAMOTIDINE 20 MG: 20 TABLET ORAL at 17:23

## 2025-02-07 RX ADMIN — ARIPIPRAZOLE 5 MG: 5 TABLET ORAL at 08:18

## 2025-02-07 NOTE — PLAN OF CARE
Problem: Ineffective Coping  Goal: Participates in unit activities  Description: Interventions:  - Provide therapeutic environment   - Provide required programming   - Redirect inappropriate behaviors   Outcome: Progressing     Problem: Depression  Goal: Treatment Goal: Demonstrate behavioral control of depressive symptoms, verbalize feelings of improved mood/affect, and adopt new coping skills prior to discharge  Outcome: Progressing  Goal: Verbalize thoughts and feelings  Description: Interventions:  - Assess and re-assess patient's level of risk   - Engage patient in 1:1 interactions, daily, for a minimum of 15 minutes   - Encourage patient to express feelings, fears, frustrations, hopes   Outcome: Progressing  Goal: Refrain from harming self  Description: Interventions:  - Monitor patient closely, per order   - Supervise medication ingestion, monitor effects and side effects   Outcome: Progressing  Goal: Refrain from isolation  Description: Interventions:  - Develop a trusting relationship   - Encourage socialization   Outcome: Progressing     Problem: Anxiety  Goal: Anxiety is at manageable level  Description: Interventions:  - Assess and monitor patient's anxiety level.   - Monitor for signs and symptoms (heart palpitations, chest pain, shortness of breath, headaches, nausea, feeling jumpy, restlessness, irritable, apprehensive).   - Collaborate with interdisciplinary team and initiate plan and interventions as ordered.  - Cost patient to unit/surroundings  - Explain treatment plan  - Encourage participation in care  - Encourage verbalization of concerns/fears  - Identify coping mechanisms  - Assist in developing anxiety-reducing skills  - Administer/offer alternative therapies  - Limit or eliminate stimulants  Outcome: Progressing     Problem: DISCHARGE PLANNING - CARE MANAGEMENT  Goal: Discharge to post-acute care or home with appropriate resources  Description: INTERVENTIONS:  - Conduct assessment to  determine patient/family and health care team treatment goals, and need for post-acute services based on payer coverage, community resources, and patient preferences, and barriers to discharge  - Address psychosocial, clinical, and financial barriers to discharge as identified in assessment in conjunction with the patient/family and health care team  - Arrange appropriate level of post-acute services according to patient’s   needs and preference and payer coverage in collaboration with the physician and health care team  - Communicate with and update the patient/family, physician, and health care team regarding progress on the discharge plan  - Arrange appropriate transportation to post-acute venues  Outcome: Progressing     Problem: Knowledge Deficit  Goal: Patient/family/caregiver demonstrates understanding of disease process, treatment plan, medications, and discharge instructions  Description: Complete learning assessment and assess knowledge base.  Interventions:  - Provide teaching at level of understanding  - Provide teaching via preferred learning methods  Outcome: Progressing     Problem: SLEEP DISTURBANCE  Goal: Will exhibit normal sleeping pattern  Description: Interventions:  -  Assess the patients sleep pattern, noting recent changes  - Administer medication as ordered  - Decrease environmental stimuli, including noise, as appropriate during the night  - Encourage the patient to actively participate in unit groups and or exercise during the day to enhance ability to achieve adequate sleep at night  - Assess the patient, in the morning, encouraging a description of sleep experience  Outcome: Progressing

## 2025-02-07 NOTE — NURSING NOTE
Seclusive to his room the first half of the shift. More visible as the day went on and is currently attending group. Denies symptoms. Compliant with medications. Waiting placement.

## 2025-02-07 NOTE — ASSESSMENT & PLAN NOTE
"Patient continues to do well and the plan is to continue with the current treatment plan as mentioned below with no changes.    Patient continues to be pending group home placement. Otherwise no clinical significant change.  SW and pt cont to work on getting documentation to [facility] office and waiting for follow up from them.    Medication regimen as follows, no changes as of 2/8/2025:  Abilify 5 mg daily as mood adjunct   Zoloft 150 mg daily for depression and anxiety  Continue to encourage participation in group therapy, milieu therapy and occupational therapy.  Continue to assess for side effects of medications.  Continue collaboration with [facility] for medical co-morbidities as indicated.  Continue discussion with CM/SW to assist with obtaining collateral, disposition planning, and the implementation of patient-centered individualized plan of care.  Continue frequent safety checks and vitals per unit protocol.    Risks, benefits and possible side effects of Medications: Risks, benefits, and possible side effects of medications have previously been explained. No new medications at this time.      Legal status: 201    Disposition: to be determined, pending [facility] application for potential group home placement. OT Cognitive Evaluation completed: \"Pt would benefit from discharge to a supportive environment that can provide checks for safety and compliance with IADLs. \"   Although patient's mood has stabilized, they are currently awaiting group home placement.  Their ability to recognize safety hazards take medications and participate in IADLs are significantly impaired by their cognitive deficits compounded by their chronic mental health needs and would be at risk for significant decompensation without the structure and support of a group home setting.      No associated orders from this encounter found during lookback period of 72 hours.  "

## 2025-02-07 NOTE — PROGRESS NOTES
"Progress Note - Behavioral Health   Name: Franco Roberson 39 y.o. male I MRN: [id number]  Unit/Bed#: -01 I Date of Admission: 5/14/2024   Date of Service: 2/8/2025 I Hospital Day: 270     Assessment & Plan  MDD (major depressive disorder), recurrent severe, without psychosis (HCC)  Patient continues to do well and the plan is to continue with the current treatment plan as mentioned below with no changes.    Patient continues to be pending group home placement. Otherwise no clinical significant change.  SW and pt cont to work on getting documentation to [facility] office and waiting for follow up from them.    Medication regimen as follows, no changes as of 2/8/2025:  Abilify 5 mg daily as mood adjunct   Zoloft 150 mg daily for depression and anxiety  Continue to encourage participation in group therapy, milieu therapy and occupational therapy.  Continue to assess for side effects of medications.  Continue collaboration with PATRICIA for medical co-morbidities as indicated.  Continue discussion with CM/SW to assist with obtaining collateral, disposition planning, and the implementation of patient-centered individualized plan of care.  Continue frequent safety checks and vitals per unit protocol.    Risks, benefits and possible side effects of Medications: Risks, benefits, and possible side effects of medications have previously been explained. No new medications at this time.      Legal status: 201    Disposition: to be determined, pending SOAR application for potential group home placement. OT Cognitive Evaluation completed: \"Pt would benefit from discharge to a supportive environment that can provide checks for safety and compliance with IADLs. \"   Although patient's mood has stabilized, they are currently awaiting group home placement.  Their ability to recognize safety hazards take medications and participate in IADLs are significantly impaired by their cognitive deficits compounded by their chronic mental health needs " "and would be at risk for significant decompensation without the structure and support of a group home setting.      No associated orders from this encounter found during lookback period of 72 hours.  Autism spectrum disorder  Continue supportive care    No associated orders from this encounter found during lookback period of 72 hours.    Hyperlipidemia  - Lipitor 40 mg     Subjective: All documentation including nursing notes, medication history to ensure medication adherence on the unit, labs, and vitals were reviewed. Franco was evaluated this morning for continuity of care and no acute distress noted throughout the evaluation. Over the past 24 hours per nursing report, Franco has been cooperative on the unit and compliant with medications. No acute events overnight. No psychotropic prns required.     Today, Franco is consenting for safety on the unit. Franco reports feeling \"pretty good.\" Franco notes having good sleep. Franco states having an adequate appetite. Franco has been taking the medications as prescribed and reporting no side effects. He states he plans on taking a break from groups today and \"resting\".     Franco denies suicidal ideations. Franco denies homicidal ideations. Regarding hallucinations, Franco denies auditory and visual hallucinations. Denies other complaints today.     PRNs overnight: None   VS: Reviewed, within normal limits    Progress Toward Goals: slow improvement    Psychiatric Review of Systems:  Sleep: normal  Appetite: normal  Medication side effects: No   ROS: all other systems are negative    Vital signs in last 24 hours:  Temp:  [97.1 °F (36.2 °C)-97.5 °F (36.4 °C)] 97.5 °F (36.4 °C)  HR:  [64-82] 64  BP: (104-120)/(66-68) 104/68  Resp:  [16] 16  SpO2:  [97 %] 97 %  O2 Device: None (Room air)    Laboratory results:  I have personally reviewed all pertinent laboratory/tests results.  No results found for this or any previous visit (from the past 48 hours).      Mental Status " "Evaluation:    Appearance:  age appropriate, casually dressed   Behavior:  cooperative, calm, guarded   Speech:  normal rate, decreased volume   Mood:  \"Pretty good\"   Affect:  constricted   Thought Process:  logical, decreased rate of thoughts   Associations: concrete associations   Thought Content:  no overt delusions   Perceptual Disturbances: Denies auditory or visual hallucinations and Does not appear to be responding to internal stimuli   Risk Potential: Suicidal ideation - None at present, contracts for safety on the unit, would talk to staff if not feeling safe on the unit  Homicidal ideation - None at present  Potential for aggression - Not at present   Sensorium:  oriented to person, place, and time/date   Memory:  recent and remote memory grossly intact   Consciousness:  alert and awake   Attention/Concentration: attention span and concentration appear shorter than expected for age   Insight:  limited   Judgment: limited   Gait/Station: in bed   Motor Activity: no abnormal movements       Current Medications:  Current Facility-Administered Medications   Medication Dose Route Frequency Provider Last Rate    acetaminophen  650 mg Oral Q6H PRN Basilio Brown MD      acetaminophen  650 mg Oral Q4H PRN Basilio Brown MD      acetaminophen  975 mg Oral Q6H PRN Basilio Brown MD      aluminum-magnesium hydroxide-simethicone  30 mL Oral Q4H PRN Basilio Brown MD      ARIPiprazole  5 mg Oral Daily Basilio Panda MD      Artificial Tears  1 drop Both Eyes Q3H PRN Basilio Brown MD      atorvastatin  40 mg Oral Daily With Dinner WALLY Baptiste      benztropine  1 mg Intramuscular Q4H PRN Max 6/day Basilio Brown MD      benztropine  1 mg Oral Q4H PRN Max 6/day Basilio Brown MD      Cholecalciferol  2,500 Units Oral Daily Dustin Mcallister MD      cyanocobalamin  1,000 mcg Oral Daily WALLY Baptiste      Diclofenac Sodium  2 g Topical 4x Daily PRN " WALLY Baptiste      hydrOXYzine HCL  50 mg Oral Q6H PRN Max 4/day Basilio Brown MD      Or    diphenhydrAMINE  50 mg Intramuscular Q6H PRN Basilio Brown MD      diphenhydrAMINE-zinc acetate   Topical Daily PRN Raj Guerrero MD      famotidine  20 mg Oral BID WALLY Baptiste      hydrOXYzine HCL  100 mg Oral Q6H PRN Max 4/day Basilio Brown MD      Or    LORazepam  2 mg Intramuscular Q6H PRN Basilio Brown MD      hydrOXYzine HCL  25 mg Oral Q6H PRN Max 4/day Basilio Brown MD      levothyroxine  37.5 mcg Oral Early Morning WALLY Baptiste      melatonin  6 mg Oral HS PRN WALLY Gomez      methocarbamol  500 mg Oral Q6H PRN WALLY Baptiste      OLANZapine  5 mg Oral Q4H PRN Max 3/day Basilio Brown MD      Or    OLANZapine  2.5 mg Intramuscular Q4H PRN Max 3/day Basilio Brown MD      OLANZapine  5 mg Oral Q3H PRN Max 3/day Basilio Brown MD      Or    OLANZapine  5 mg Intramuscular Q3H PRN Max 3/day Basilio Brown MD      OLANZapine  2.5 mg Oral Q4H PRN Max 6/day Basilio Brown MD      polyethylene glycol  17 g Oral Daily PRN Basilio Brown MD      propranolol  10 mg Oral Q8H PRN Basilio Brown MD      senna-docusate sodium  2 tablet Oral After Dinner Dustin Mcallister MD      sertraline  150 mg Oral Daily WALLY Gomez      traZODone  50 mg Oral HS PRN Basilio Brown MD         Behavioral Health Medications: All current active meds have been reviewed. Changes as in plan section above.  Risks, benefits and possible side effects of Medications:   Risks, benefits, and possible side effects of medications explained to patient and patient verbalizes understanding.      Counseling / Coordination of Care:  Patient's progress discussed with staff in treatment team meeting.  Medications, treatment progress and treatment plan reviewed with patient.    Rosangela Robertson,  DO  Psychiatry Resident, PGY-2    This note was completed in part utilizing Dragon dictation Software. Grammatical, translation, syntax errors, random word insertions, spelling mistakes, and incomplete sentences may be an occasional consequence of this system secondary to software limitations with voice recognition, ambient noise, and hardware issues. If you have any questions or concerns about the content, text, or information contained within the body of this dictation, please contact the provider for clarification.

## 2025-02-07 NOTE — ASSESSMENT & PLAN NOTE
"Patient continues to do well and the plan is to continue with the current treatment plan as mentioned below with no changes.    Patient continues to be pending group home placement. Otherwise no clinical significant change.  SW and pt cont to work on getting documentation to [facility] office and waiting for follow up from them.    Medication regimen as follows, no changes as of 2/7/2025:  Abilify 5 mg daily as mood adjunct   Zoloft 150 mg daily for depression and anxiety  Continue to encourage participation in group therapy, milieu therapy and occupational therapy.  Continue to assess for side effects of medications.  Continue collaboration with [facility] for medical co-morbidities as indicated.  Continue discussion with CM/SW to assist with obtaining collateral, disposition planning, and the implementation of patient-centered individualized plan of care.  Continue frequent safety checks and vitals per unit protocol.    Risks, benefits and possible side effects of Medications: Risks, benefits, and possible side effects of medications have previously been explained. No new medications at this time.      Legal status: 201    Disposition: to be determined, pending [facility] application for potential group home placement. OT Cognitive Evaluation completed: \"Pt would benefit from discharge to a supportive environment that can provide checks for safety and compliance with IADLs. \"   Although patient's mood has stabilized, they are currently awaiting group home placement.  Their ability to recognize safety hazards take medications and participate in IADLs are significantly impaired by their cognitive deficits compounded by their chronic mental health needs and would be at risk for significant decompensation without the structure and support of a group home setting.      No associated orders from this encounter found during lookback period of 72 hours.  "

## 2025-02-07 NOTE — TREATMENT PLAN
TREATMENT PLAN REVIEW - Behavioral Health Franco Roberson 39 y.o. 1985 male MRN: [id number]    [facility] 3B BE[facility]AVIOR[facility] Room / Bed: [facility]-01 Encounter: [id number]          Admit Date/Time:  5/14/2024  4:45 PM    Treatment Team:   MD Isaac Heard RN Elana Roman Jennifer King Yamilka Acosta    Diagnosis: Principal Problem:    MDD (major depressive disorder), recurrent severe, without psychosis (HCC)  Active Problems:    Unspecified depressive disorder (HCC)    Medical clearance for psychiatric admission    Hyperlipidemia    Vitamin D deficiency    B12 deficiency    Hypothyroidism    Autism spectrum disorder      Patient Strengths/Assets: cooperative, compliant with medication, negotiates basic needs    Patient Barriers/Limitations: difficulty adapting, lack of financial means, lack of social/family support, lack of stable employment    Short Term Goals: decrease in depressive symptoms, mood stabilization    Long Term Goals: improvement in depression, stabilization of mood, improved insight, stable living arrangements upon discharge    Progress Towards Goals: continue psychiatric medications as prescribed, progressing, depression is improving, placement pending    Recommended Treatment: medication management, patient medication education, group therapy, milieu therapy, continued Behavioral Health psychiatric evaluation/assessment process    Treatment Frequency: daily medication monitoring, group and milieu therapy daily, monitoring through interdisciplinary rounds, monitoring through weekly patient care conferences    Expected Discharge Date:  Greater than 2 midnights    Discharge Plan: placement in group home, referrals as indicated    Treatment Plan Created/Updated By: Dustin Mcallister MD

## 2025-02-07 NOTE — PROGRESS NOTES
"Progress Note - Behavioral Health   Name: Franco Roberson 39 y.o. male I MRN: [id number]  Unit/Bed#: -01 I Date of Admission: 5/14/2024   Date of Service: 2/7/2025 I Hospital Day: 269     Assessment & Plan  MDD (major depressive disorder), recurrent severe, without psychosis (HCC)  Patient continues to do well and the plan is to continue with the current treatment plan as mentioned below with no changes.    Patient continues to be pending group home placement. Otherwise no clinical significant change.  SW and pt cont to work on getting documentation to [facility] office and waiting for follow up from them.    Medication regimen as follows, no changes as of 2/7/2025:  Abilify 5 mg daily as mood adjunct   Zoloft 150 mg daily for depression and anxiety  Continue to encourage participation in group therapy, milieu therapy and occupational therapy.  Continue to assess for side effects of medications.  Continue collaboration with PATRICIA for medical co-morbidities as indicated.  Continue discussion with CM/SW to assist with obtaining collateral, disposition planning, and the implementation of patient-centered individualized plan of care.  Continue frequent safety checks and vitals per unit protocol.    Risks, benefits and possible side effects of Medications: Risks, benefits, and possible side effects of medications have previously been explained. No new medications at this time.      Legal status: 201    Disposition: to be determined, pending [facility]AR application for potential group home placement. OT Cognitive Evaluation completed: \"Pt would benefit from discharge to a supportive environment that can provide checks for safety and compliance with IADLs. \"   Although patient's mood has stabilized, they are currently awaiting group home placement.  Their ability to recognize safety hazards take medications and participate in IADLs are significantly impaired by their cognitive deficits compounded by their chronic mental health needs " and would be at risk for significant decompensation without the structure and support of a group home setting.      No associated orders from this encounter found during lookback period of 72 hours.  Autism spectrum disorder  Continue supportive care    No associated orders from this encounter found during lookback period of 72 hours.    Hyperlipidemia  - Lipitor 40 mg       Plan     Recommended Treatment:    - Encourage early mobility and having a structured day  - Provide frequent re-orientation, and cognitive stimulation  - Ensure assistive devices are in proper working order (eye-glasses, hearing aids)  - Encourage adequate hydration, nutrition and monitor bowel movements  - Maintain sleep-wake cycle: Uninterrupted sleep time; low-level lighting at night  - Fall precaution  - f/u [facility] recs regarding the medical problems   - Continue medication titration and treatment plan; adjust medication to optimize treatment response and as clinically indicated. .   - Observation: routine            - VS: as per unit protocol  - Diet: Regular diet  - Encourage group attendance and milieu therapy  - Dispo: To be determined     Scheduled medications:  Current Facility-Administered Medications   Medication Dose Route Frequency Provider Last Rate    acetaminophen  650 mg Oral Q6H PRN Basilio Brown MD      acetaminophen  650 mg Oral Q4H PRN Basilio Brown MD      acetaminophen  975 mg Oral Q6H PRN Basilio Brown MD      aluminum-magnesium hydroxide-simethicone  30 mL Oral Q4H PRN Basilio Brown MD      ARIPiprazole  5 mg Oral Daily Basilio Panda MD      Artificial Tears  1 drop Both Eyes Q3H PRN Basilio Brown MD      atorvastatin  40 mg Oral Daily With Dinner WALLY Baptiste      benztropine  1 mg Intramuscular Q4H PRN Max 6/day Basilio Brown MD      benztropine  1 mg Oral Q4H PRN Max 6/day Basilio Brown MD      Cholecalciferol  2,500 Units Oral Daily Dustin Mcallister MD       cyanocobalamin  1,000 mcg Oral Daily WALLY Baptiste      Diclofenac Sodium  2 g Topical 4x Daily PRN WALLY Baptiste      hydrOXYzine HCL  50 mg Oral Q6H PRN Max 4/day Basilio Brown MD      Or    diphenhydrAMINE  50 mg Intramuscular Q6H PRN Basilio rBown MD      diphenhydrAMINE-zinc acetate   Topical Daily PRN Raj Guerrero MD      famotidine  20 mg Oral BID LauraWALLY Tony      hydrOXYzine HCL  100 mg Oral Q6H PRN Max 4/day Basilio Brown MD      Or    LORazepam  2 mg Intramuscular Q6H PRN Basilio Brown MD      hydrOXYzine HCL  25 mg Oral Q6H PRN Max 4/day Basilio Brown MD      levothyroxine  37.5 mcg Oral Early Morning WALLY Baptiste      melatonin  6 mg Oral HS PRN WALLY Gomez      methocarbamol  500 mg Oral Q6H PRN WALLY Baptiste      OLANZapine  5 mg Oral Q4H PRN Max 3/day Basilio Brown MD      Or    OLANZapine  2.5 mg Intramuscular Q4H PRN Max 3/day Basilio Brown MD      OLANZapine  5 mg Oral Q3H PRN Max 3/day Basilio Brown MD      Or    OLANZapine  5 mg Intramuscular Q3H PRN Max 3/day Basilio Brown MD      OLANZapine  2.5 mg Oral Q4H PRN Max 6/day Basilio Brown MD      polyethylene glycol  17 g Oral Daily PRN Basilio Brown MD      propranolol  10 mg Oral Q8H PRN Basilio Brown MD      senna-docusate sodium  2 tablet Oral After Dinner Dustin Mcallister MD      sertraline  150 mg Oral Daily WALLY Gomez      traZODone  50 mg Oral HS PRN Basilio Brown MD        PRN:    acetaminophen    acetaminophen    acetaminophen    aluminum-magnesium hydroxide-simethicone    Artificial Tears    benztropine    benztropine    Diclofenac Sodium    hydrOXYzine HCL **OR** diphenhydrAMINE    diphenhydrAMINE-zinc acetate    hydrOXYzine HCL **OR** LORazepam    hydrOXYzine HCL    melatonin    methocarbamol    OLANZapine **OR** OLANZapine    OLANZapine **OR**  "OLANZapine    OLANZapine    polyethylene glycol    propranolol    traZODone       Subjective     Patient was visited on unit for continuing care; chart reviewed and discussed with multidisciplinary treatment team.  On approach, the patient was calm and cooperative. Denied any changes in mood, appetite, and energy level. No problem initiating and maintaining sleep.  Denied A/VH currently.  Denied SI/HI, intent or plan upon direct inquiry at this time.    Patient continues to be visible in the milieu and interacts with staff and peers. No reports of aggression or self-injurious behavior on unit. No PRN medications used in the past 24 hours.    Patient accepted all offered medications and no adverse effects of medications noted or reported.    Objective    Current Mental Status Evaluation:  Mental Status Exam  Appearance: casually dressed, consistent with stated age  Motor: no psychomotor retardation, no gait abnormalities  Behavior: cooperative, answers questions appropriately  Speech: soft, normal rhythm  Mood: \"ok\"  Affect: blunted  Thought Process: concrete  Thought Content: denies delusions  Risk Potential: denies suicidal ideation, plan, or intent. Denies homicidal ideation  Perceptions: denies auditory hallucinations, denies visual hallucinations,   Sensorium: Oriented to person, place, time, and situation  Cognition: cognitive ability appears intact but was not quantitatively tested  Consciousness: alert and awake  Attention: intact, able to focus without difficulty  Insight: limited  Judgement: limited            Vital signs in last 24 hours:    Temp:  [97.5 °F (36.4 °C)-97.8 °F (36.6 °C)] 97.5 °F (36.4 °C)  HR:  [66-86] 66  BP: (108-123)/(69-71) 108/71  Resp:  [16] 16  SpO2:  [96 %-98 %] 96 %  O2 Device: None (Room air)    Psychiatric Review of Systems:  Medication adverse effects: none  Sleep: unchanged  Appetite: unchanged  Behaviors over the past 24 hours: unchanged    Laboratory results:    I have " personally reviewed all pertinent laboratory/tests results  No results found for this or any previous visit (from the past 48 hours).       Progress Toward Goals & Illness Status:   progressing, participates in milieu therapy, mood is stabilizing, placement pending    Patient is not at goal. They are not yet ready for discharge. The patient's condition currently requires active psychopharmacological medication management, interdisciplinary coordination with case management, and the utilization of adjunctive milieu and group therapy to augment psychopharmacological efficacy. The patient's risk of morbidity, and progression or decompensation of psychiatric disease, is higher without this current treatment.     Next of Kin  Extended Emergency Contact Information  Primary Emergency Contact: Lois Roberson  Address: [address] [place] [address] [place]           [address] [place]  Home Phone: [phone]  Relation: Mother    Counseling / Coordination of Care  Patient's progress discussed with staff in treatment team meeting.  Medications, treatment progress and treatment plan reviewed with patient.  Medication education provided to patient.  Educated on importance of medication and treatment compliance.  Supportive therapy provided to patient.  Cognitive techniques utilized during the session.  Reassurance and supportive therapy provided.  Reoriented to reality and reassured.  Encouraged participation in milieu and group therapy on the unit.  Crisis/safety plan discussed with patient.       Dustin Mcallister MD  Attending Psychiatrist   [facility]      This note has been constructed using a voice recognition system. There may be translation, syntax, or grammatical errors. If you have any questions, please contact the dictating provider.

## 2025-02-08 PROCEDURE — 99232 SBSQ HOSP IP/OBS MODERATE 35: CPT | Performed by: PSYCHIATRY & NEUROLOGY

## 2025-02-08 RX ADMIN — SENNOSIDES AND DOCUSATE SODIUM 2 TABLET: 50; 8.6 TABLET ORAL at 17:05

## 2025-02-08 RX ADMIN — SERTRALINE HYDROCHLORIDE 150 MG: 100 TABLET ORAL at 08:21

## 2025-02-08 RX ADMIN — CYANOCOBALAMIN TAB 1000 MCG 1000 MCG: 1000 TAB at 08:21

## 2025-02-08 RX ADMIN — LEVOTHYROXINE SODIUM 37.5 MCG: 0.07 TABLET ORAL at 06:51

## 2025-02-08 RX ADMIN — FAMOTIDINE 20 MG: 20 TABLET ORAL at 08:21

## 2025-02-08 RX ADMIN — FAMOTIDINE 20 MG: 20 TABLET ORAL at 17:05

## 2025-02-08 RX ADMIN — Medication 2500 UNITS: at 08:20

## 2025-02-08 RX ADMIN — ARIPIPRAZOLE 5 MG: 5 TABLET ORAL at 08:21

## 2025-02-08 RX ADMIN — ATORVASTATIN CALCIUM 40 MG: 40 TABLET, FILM COATED ORAL at 17:05

## 2025-02-08 NOTE — NURSING NOTE
Patient is calm and cooperative. Patient appears depressed and was isolative to his room most of the morning. Denies SI/HI/AVH and pain. Medication and meal compliant.

## 2025-02-08 NOTE — PLAN OF CARE
Problem: Ineffective Coping  Goal: Participates in unit activities  Description: Interventions:  - Provide therapeutic environment   - Provide required programming   - Redirect inappropriate behaviors   Outcome: Progressing     Problem: Depression  Goal: Treatment Goal: Demonstrate behavioral control of depressive symptoms, verbalize feelings of improved mood/affect, and adopt new coping skills prior to discharge  Outcome: Progressing  Goal: Verbalize thoughts and feelings  Description: Interventions:  - Assess and re-assess patient's level of risk   - Engage patient in 1:1 interactions, daily, for a minimum of 15 minutes   - Encourage patient to express feelings, fears, frustrations, hopes   Outcome: Progressing  Goal: Refrain from harming self  Description: Interventions:  - Monitor patient closely, per order   - Supervise medication ingestion, monitor effects and side effects   Outcome: Progressing  Goal: Refrain from isolation  Description: Interventions:  - Develop a trusting relationship   - Encourage socialization   Outcome: Progressing     Problem: Anxiety  Goal: Anxiety is at manageable level  Description: Interventions:  - Assess and monitor patient's anxiety level.   - Monitor for signs and symptoms (heart palpitations, chest pain, shortness of breath, headaches, nausea, feeling jumpy, restlessness, irritable, apprehensive).   - Collaborate with interdisciplinary team and initiate plan and interventions as ordered.  - [place] patient to unit/surroundings  - Explain treatment plan  - Encourage participation in care  - Encourage verbalization of concerns/fears  - Identify coping mechanisms  - Assist in developing anxiety-reducing skills  - Administer/offer alternative therapies  - Limit or eliminate stimulants  Outcome: Progressing     Problem: DISCHARGE PLANNING - CARE MANAGEMENT  Goal: Discharge to post-acute care or home with appropriate resources  Description: INTERVENTIONS:  - Conduct assessment to  determine patient/family and health care team treatment goals, and need for post-acute services based on payer coverage, community resources, and patient preferences, and barriers to discharge  - Address psychosocial, clinical, and financial barriers to discharge as identified in assessment in conjunction with the patient/family and health care team  - Arrange appropriate level of post-acute services according to patient’s   needs and preference and payer coverage in collaboration with the physician and health care team  - Communicate with and update the patient/family, physician, and health care team regarding progress on the discharge plan  - Arrange appropriate transportation to post-acute venues  Outcome: Progressing     Problem: Knowledge Deficit  Goal: Patient/family/caregiver demonstrates understanding of disease process, treatment plan, medications, and discharge instructions  Description: Complete learning assessment and assess knowledge base.  Interventions:  - Provide teaching at level of understanding  - Provide teaching via preferred learning methods  Outcome: Progressing     Problem: SLEEP DISTURBANCE  Goal: Will exhibit normal sleeping pattern  Description: Interventions:  -  Assess the patients sleep pattern, noting recent changes  - Administer medication as ordered  - Decrease environmental stimuli, including noise, as appropriate during the night  - Encourage the patient to actively participate in unit groups and or exercise during the day to enhance ability to achieve adequate sleep at night  - Assess the patient, in the morning, encouraging a description of sleep experience  Outcome: Progressing

## 2025-02-08 NOTE — NURSING NOTE
Patient visible on unit, isolative to self. Patient calm and cooperative upon approach. Patient denies SI/HI/AH/VH. Patient is compliant with meds and meals.

## 2025-02-08 NOTE — NURSING NOTE
Patient flat, withdrawn, calm and cooperative, able to make needs known. Is occasionally visible on the module, social with select peers. Denies SI/HI/AVH, anxiety or depression. Medication compliant. Will continue to monitor.

## 2025-02-09 PROCEDURE — 99232 SBSQ HOSP IP/OBS MODERATE 35: CPT | Performed by: PSYCHIATRY & NEUROLOGY

## 2025-02-09 RX ADMIN — FAMOTIDINE 20 MG: 20 TABLET ORAL at 17:04

## 2025-02-09 RX ADMIN — LEVOTHYROXINE SODIUM 37.5 MCG: 0.07 TABLET ORAL at 06:46

## 2025-02-09 RX ADMIN — FAMOTIDINE 20 MG: 20 TABLET ORAL at 08:26

## 2025-02-09 RX ADMIN — SENNOSIDES AND DOCUSATE SODIUM 2 TABLET: 50; 8.6 TABLET ORAL at 17:04

## 2025-02-09 RX ADMIN — CYANOCOBALAMIN TAB 1000 MCG 1000 MCG: 1000 TAB at 08:26

## 2025-02-09 RX ADMIN — ATORVASTATIN CALCIUM 40 MG: 40 TABLET, FILM COATED ORAL at 17:04

## 2025-02-09 RX ADMIN — ARIPIPRAZOLE 5 MG: 5 TABLET ORAL at 08:26

## 2025-02-09 RX ADMIN — Medication 2500 UNITS: at 08:26

## 2025-02-09 RX ADMIN — SERTRALINE HYDROCHLORIDE 150 MG: 100 TABLET ORAL at 08:26

## 2025-02-09 NOTE — NURSING NOTE
Pt is withdrawn to self and room but out for meals and needs. Medication compliant. Pt reports he takes breaks from groups on the weekends and does not plan to attend any today. Denies SI/HI/AH/VH but does appear sad. Denies any unmet needs or complaints.

## 2025-02-09 NOTE — PROGRESS NOTES
"Progress Note - Behavioral Health   Name: Franco Roberson 39 y.o. male I MRN: [id number]  Unit/Bed#: -01 I Date of Admission: 5/14/2024   Date of Service: 2/9/2025 I Hospital Day: 271     Assessment & Plan  MDD (major depressive disorder), recurrent severe, without psychosis (HCC)  Patient continues to do well and the plan is to continue with the current treatment plan as mentioned below with no changes.    Patient continues to be pending group home placement. Otherwise no clinical significant change.  SW and pt cont to work on getting documentation to [facility] office and waiting for follow up from them.    Medication regimen as follows, no changes as of 2/9/2025:  Abilify 5 mg daily as mood adjunct   Zoloft 150 mg daily for depression and anxiety  Continue to encourage participation in group therapy, milieu therapy and occupational therapy.  Continue to assess for side effects of medications.  Continue collaboration with PATRICIA for medical co-morbidities as indicated.  Continue discussion with CM/SW to assist with obtaining collateral, disposition planning, and the implementation of patient-centered individualized plan of care.  Continue frequent safety checks and vitals per unit protocol.    Risks, benefits and possible side effects of Medications: Risks, benefits, and possible side effects of medications have previously been explained. No new medications at this time.      Legal status: 201    Disposition: to be determined, pending SOAR application for potential group home placement. OT Cognitive Evaluation completed: \"Pt would benefit from discharge to a supportive environment that can provide checks for safety and compliance with IADLs. \"   Although patient's mood has stabilized, they are currently awaiting group home placement.  Their ability to recognize safety hazards take medications and participate in IADLs are significantly impaired by their cognitive deficits compounded by their chronic mental health needs " "and would be at risk for significant decompensation without the structure and support of a group home setting.      No associated orders from this encounter found during lookback period of 72 hours.  Autism spectrum disorder  Continue supportive care    No associated orders from this encounter found during lookback period of 72 hours.    Hyperlipidemia  - Lipitor 40 mg       Subjective: All documentation including nursing notes, medication history to ensure medication adherence on the unit, labs, and vitals were reviewed. Franco was evaluated this morning for continuity of care and no acute distress noted throughout the evaluation. Over the past 24 hours per nursing report, Franco has been cooperative on the unit and compliant with medications. Overnight per nursing, patient noted to be interactive with select peers, no behavioral issues noted. No psychotropic prns required. No acute events overnight.     Today, Franco is consenting for safety on the unit. Franco reports feeling \"pretty good.\" Franco notes having good sleep. Franco states having an adequate appetite. Franco has been taking the medications as prescribed and reporting no side effects. When asked what he has been doing to pass time, patient states \"Sleep, read my notebook of poems, and hang out\". Patient encouraged to attend groups, however states he does not plan on attending as it is the weekend. He states he has been getting along with peers.     Franco denies suicidal ideations. Franco denies homicidal ideations. Regarding hallucinations, Franco denies auditory or visual hallucinations. He denies other complaints today.     PRNs overnight: none   VS: Reviewed, within normal limits    Progress Toward Goals: slow improvement    Psychiatric Review of Systems:  Behavior over the last 24 hours:  unchanged  Sleep: normal  Appetite: normal  Medication side effects: No   ROS: all other systems are negative    Vital signs in last 24 hours:  Temp:  [97.8 °F " "(36.6 °C)-97.9 °F (36.6 °C)] 97.8 °F (36.6 °C)  HR:  [70-77] 70  BP: (102-117)/(59-70) 102/70  Resp:  [16] 16  SpO2:  [95 %-98 %] 95 %  O2 Device: None (Room air)    Laboratory results:  I have personally reviewed all pertinent laboratory/tests results.  No results found for this or any previous visit (from the past 48 hours).      Mental Status Evaluation:    Appearance:  age appropriate, casually dressed, sitting in bed    Behavior:  cooperative, guarded, pleasant    Speech:  normal rate, decreased volume   Mood:  \"Pretty good\"   Affect:  constricted   Thought Process:  logical, decreased rate of thoughts   Associations: concrete associations   Thought Content:  no overt delusions   Perceptual Disturbances: Denies auditory or visual hallucinations and Does not appear to be responding to internal stimuli   Risk Potential: Suicidal ideation - None at present, contracts for safety on the unit, would talk to staff if not feeling safe on the unit  Homicidal ideation - None at present  Potential for aggression - Not at present   Sensorium:  oriented to person, place, and time/date   Memory:  recent and remote memory grossly intact   Consciousness:  alert and awake   Attention/Concentration: attention span and concentration appear shorter than expected for age   Insight:  limited   Judgment: limited   Gait/Station: in bed   Motor Activity: no abnormal movements       Current Medications:  Current Facility-Administered Medications   Medication Dose Route Frequency Provider Last Rate    acetaminophen  650 mg Oral Q6H PRN Basilio Brown MD      acetaminophen  650 mg Oral Q4H PRN Basilio Brown MD      acetaminophen  975 mg Oral Q6H PRN Basilio Brown MD      aluminum-magnesium hydroxide-simethicone  30 mL Oral Q4H PRN Basilio Brown MD      ARIPiprazole  5 mg Oral Daily Basilio Panda MD      Artificial Tears  1 drop Both Eyes Q3H PRN Basilio Brown MD      atorvastatin  40 mg Oral Daily With " Dinner WALLY Baptiste      benztropine  1 mg Intramuscular Q4H PRN Max 6/day Basilio Brown MD      benztropine  1 mg Oral Q4H PRN Max 6/day Basilio Brown MD      Cholecalciferol  2,500 Units Oral Daily Dustin Mcallister MD      cyanocobalamin  1,000 mcg Oral Daily Laura WALLY Olmos      Diclofenac Sodium  2 g Topical 4x Daily PRN WALLY Baptiste      hydrOXYzine HCL  50 mg Oral Q6H PRN Max 4/day Basilio Brown MD      Or    diphenhydrAMINE  50 mg Intramuscular Q6H PRN Basilio Brown MD      diphenhydrAMINE-zinc acetate   Topical Daily PRN Raj Guerrero MD      famotidine  20 mg Oral BID WALLY Baptiste      hydrOXYzine HCL  100 mg Oral Q6H PRN Max 4/day Basilio Brown MD      Or    LORazepam  2 mg Intramuscular Q6H PRN Basilio Brown MD      hydrOXYzine HCL  25 mg Oral Q6H PRN Max 4/day Basilio Brown MD      levothyroxine  37.5 mcg Oral Early Morning WALLY Baptiste      melatonin  6 mg Oral HS PRN WALLY Gomez      methocarbamol  500 mg Oral Q6H PRN WALLY Baptiste      OLANZapine  5 mg Oral Q4H PRN Max 3/day Basilio Brown MD      Or    OLANZapine  2.5 mg Intramuscular Q4H PRN Max 3/day Basilio Brown MD      OLANZapine  5 mg Oral Q3H PRN Max 3/day Basilio Brown MD      Or    OLANZapine  5 mg Intramuscular Q3H PRN Max 3/day Basilio Brown MD      OLANZapine  2.5 mg Oral Q4H PRN Max 6/day Basilio Brown MD      polyethylene glycol  17 g Oral Daily PRN Basilio Brown MD      propranolol  10 mg Oral Q8H PRN Basilio Brown MD      senna-docusate sodium  2 tablet Oral After Dinner Dustin Mcallister MD      sertraline  150 mg Oral Daily WALLY Gomez      traZODone  50 mg Oral HS PRN Basilio Brown MD         Behavioral Health Medications: All current active meds have been reviewed. Changes as in plan section above.  Risks, benefits and possible  side effects of Medications:   Risks, benefits, and possible side effects of medications explained to patient and patient verbalizes understanding.      Counseling / Coordination of Care:  Patient's progress discussed with staff in treatment team meeting.  Medications, treatment progress and treatment plan reviewed with patient.    Rosangela Robertson DO  Psychiatry Resident, PGY-2    This note was completed in part utilizing Dragon dictation Software. Grammatical, translation, syntax errors, random word insertions, spelling mistakes, and incomplete sentences may be an occasional consequence of this system secondary to software limitations with voice recognition, ambient noise, and hardware issues. If you have any questions or concerns about the content, text, or information contained within the body of this dictation, please contact the provider for clarification.

## 2025-02-09 NOTE — ASSESSMENT & PLAN NOTE
"Patient continues to do well and the plan is to continue with the current treatment plan as mentioned below with no changes.    Patient continues to be pending group home placement. Otherwise no clinical significant change.  SW and pt cont to work on getting documentation to [facility] office and waiting for follow up from them.    Medication regimen as follows, no changes as of 2/9/2025:  Abilify 5 mg daily as mood adjunct   Zoloft 150 mg daily for depression and anxiety  Continue to encourage participation in group therapy, milieu therapy and occupational therapy.  Continue to assess for side effects of medications.  Continue collaboration with [facility] for medical co-morbidities as indicated.  Continue discussion with CM/SW to assist with obtaining collateral, disposition planning, and the implementation of patient-centered individualized plan of care.  Continue frequent safety checks and vitals per unit protocol.    Risks, benefits and possible side effects of Medications: Risks, benefits, and possible side effects of medications have previously been explained. No new medications at this time.      Legal status: 201    Disposition: to be determined, pending [facility] application for potential group home placement. OT Cognitive Evaluation completed: \"Pt would benefit from discharge to a supportive environment that can provide checks for safety and compliance with IADLs. \"   Although patient's mood has stabilized, they are currently awaiting group home placement.  Their ability to recognize safety hazards take medications and participate in IADLs are significantly impaired by their cognitive deficits compounded by their chronic mental health needs and would be at risk for significant decompensation without the structure and support of a group home setting.      No associated orders from this encounter found during lookback period of 72 hours.  "

## 2025-02-09 NOTE — PLAN OF CARE
Problem: Ineffective Coping  Goal: Participates in unit activities  Description: Interventions:  - Provide therapeutic environment   - Provide required programming   - Redirect inappropriate behaviors   Outcome: Progressing     Problem: Depression  Goal: Treatment Goal: Demonstrate behavioral control of depressive symptoms, verbalize feelings of improved mood/affect, and adopt new coping skills prior to discharge  Outcome: Progressing  Goal: Verbalize thoughts and feelings  Description: Interventions:  - Assess and re-assess patient's level of risk   - Engage patient in 1:1 interactions, daily, for a minimum of 15 minutes   - Encourage patient to express feelings, fears, frustrations, hopes   Outcome: Progressing  Goal: Refrain from harming self  Description: Interventions:  - Monitor patient closely, per order   - Supervise medication ingestion, monitor effects and side effects   Outcome: Progressing  Goal: Refrain from isolation  Description: Interventions:  - Develop a trusting relationship   - Encourage socialization   Outcome: Progressing     Problem: Anxiety  Goal: Anxiety is at manageable level  Description: Interventions:  - Assess and monitor patient's anxiety level.   - Monitor for signs and symptoms (heart palpitations, chest pain, shortness of breath, headaches, nausea, feeling jumpy, restlessness, irritable, apprehensive).   - Collaborate with interdisciplinary team and initiate plan and interventions as ordered.  - [place] patient to unit/surroundings  - Explain treatment plan  - Encourage participation in care  - Encourage verbalization of concerns/fears  - Identify coping mechanisms  - Assist in developing anxiety-reducing skills  - Administer/offer alternative therapies  - Limit or eliminate stimulants  Outcome: Progressing     Problem: DISCHARGE PLANNING - CARE MANAGEMENT  Goal: Discharge to post-acute care or home with appropriate resources  Description: INTERVENTIONS:  - Conduct assessment to  determine patient/family and health care team treatment goals, and need for post-acute services based on payer coverage, community resources, and patient preferences, and barriers to discharge  - Address psychosocial, clinical, and financial barriers to discharge as identified in assessment in conjunction with the patient/family and health care team  - Arrange appropriate level of post-acute services according to patient’s   needs and preference and payer coverage in collaboration with the physician and health care team  - Communicate with and update the patient/family, physician, and health care team regarding progress on the discharge plan  - Arrange appropriate transportation to post-acute venues  Outcome: Progressing     Problem: Knowledge Deficit  Goal: Patient/family/caregiver demonstrates understanding of disease process, treatment plan, medications, and discharge instructions  Description: Complete learning assessment and assess knowledge base.  Interventions:  - Provide teaching at level of understanding  - Provide teaching via preferred learning methods  Outcome: Progressing     Problem: SLEEP DISTURBANCE  Goal: Will exhibit normal sleeping pattern  Description: Interventions:  -  Assess the patients sleep pattern, noting recent changes  - Administer medication as ordered  - Decrease environmental stimuli, including noise, as appropriate during the night  - Encourage the patient to actively participate in unit groups and or exercise during the day to enhance ability to achieve adequate sleep at night  - Assess the patient, in the morning, encouraging a description of sleep experience  Outcome: Progressing

## 2025-02-10 PROCEDURE — 99232 SBSQ HOSP IP/OBS MODERATE 35: CPT | Performed by: PSYCHIATRY & NEUROLOGY

## 2025-02-10 RX ADMIN — CYANOCOBALAMIN TAB 1000 MCG 1000 MCG: 1000 TAB at 08:25

## 2025-02-10 RX ADMIN — ATORVASTATIN CALCIUM 40 MG: 40 TABLET, FILM COATED ORAL at 17:21

## 2025-02-10 RX ADMIN — LEVOTHYROXINE SODIUM 37.5 MCG: 0.07 TABLET ORAL at 06:06

## 2025-02-10 RX ADMIN — ARIPIPRAZOLE 5 MG: 5 TABLET ORAL at 08:25

## 2025-02-10 RX ADMIN — FAMOTIDINE 20 MG: 20 TABLET ORAL at 08:25

## 2025-02-10 RX ADMIN — Medication 2500 UNITS: at 08:25

## 2025-02-10 RX ADMIN — SERTRALINE HYDROCHLORIDE 150 MG: 100 TABLET ORAL at 08:25

## 2025-02-10 RX ADMIN — SENNOSIDES AND DOCUSATE SODIUM 2 TABLET: 50; 8.6 TABLET ORAL at 17:21

## 2025-02-10 RX ADMIN — FAMOTIDINE 20 MG: 20 TABLET ORAL at 17:21

## 2025-02-10 NOTE — PLAN OF CARE
Problem: Depression  Goal: Treatment Goal: Demonstrate behavioral control of depressive symptoms, verbalize feelings of improved mood/affect, and adopt new coping skills prior to discharge  Outcome: Progressing  Goal: Verbalize thoughts and feelings  Description: Interventions:  - Assess and re-assess patient's level of risk   - Engage patient in 1:1 interactions, daily, for a minimum of 15 minutes   - Encourage patient to express feelings, fears, frustrations, hopes   Outcome: Progressing  Goal: Refrain from harming self  Description: Interventions:  - Monitor patient closely, per order   - Supervise medication ingestion, monitor effects and side effects   Outcome: Progressing     Problem: Anxiety  Goal: Anxiety is at manageable level  Description: Interventions:  - Assess and monitor patient's anxiety level.   - Monitor for signs and symptoms (heart palpitations, chest pain, shortness of breath, headaches, nausea, feeling jumpy, restlessness, irritable, apprehensive).   - Collaborate with interdisciplinary team and initiate plan and interventions as ordered.  - [place] patient to unit/surroundings  - Explain treatment plan  - Encourage participation in care  - Encourage verbalization of concerns/fears  - Identify coping mechanisms  - Assist in developing anxiety-reducing skills  - Administer/offer alternative therapies  - Limit or eliminate stimulants  Outcome: Progressing     Problem: DISCHARGE PLANNING - CARE MANAGEMENT  Goal: Discharge to post-acute care or home with appropriate resources  Description: INTERVENTIONS:  - Conduct assessment to determine patient/family and health care team treatment goals, and need for post-acute services based on payer coverage, community resources, and patient preferences, and barriers to discharge  - Address psychosocial, clinical, and financial barriers to discharge as identified in assessment in conjunction with the patient/family and health care team  - Arrange appropriate  level of post-acute services according to patient’s   needs and preference and payer coverage in collaboration with the physician and health care team  - Communicate with and update the patient/family, physician, and health care team regarding progress on the discharge plan  - Arrange appropriate transportation to post-acute venues  Outcome: Progressing     Problem: Knowledge Deficit  Goal: Patient/family/caregiver demonstrates understanding of disease process, treatment plan, medications, and discharge instructions  Description: Complete learning assessment and assess knowledge base.  Interventions:  - Provide teaching at level of understanding  - Provide teaching via preferred learning methods  Outcome: Progressing     Problem: SLEEP DISTURBANCE  Goal: Will exhibit normal sleeping pattern  Description: Interventions:  -  Assess the patients sleep pattern, noting recent changes  - Administer medication as ordered  - Decrease environmental stimuli, including noise, as appropriate during the night  - Encourage the patient to actively participate in unit groups and or exercise during the day to enhance ability to achieve adequate sleep at night  - Assess the patient, in the morning, encouraging a description of sleep experience  Outcome: Progressing     Problem: Ineffective Coping  Goal: Participates in unit activities  Description: Interventions:  - Provide therapeutic environment   - Provide required programming   - Redirect inappropriate behaviors   Outcome: Not Progressing     Problem: Depression  Goal: Refrain from isolation  Description: Interventions:  - Develop a trusting relationship   - Encourage socialization   Outcome: Not Progressing

## 2025-02-10 NOTE — PROGRESS NOTES
02/10/25 0839   Team Meeting   Meeting Type Daily Rounds   Team Members Present   Team Members Present Physician;Nurse;   Physician Team Member Ary   [facility]   Patient/Family Present   Patient Present No   Patient's Family Present No     Pt is medication and meal compliant. Pt denies SI/HI/AVH. Pt is calm and cooperative. Pt's discharge is pending for placement.

## 2025-02-10 NOTE — ASSESSMENT & PLAN NOTE
"Patient continues to do well and the plan is to continue with the current treatment plan as mentioned below with no changes.    Patient continues to be pending group home placement. Otherwise no clinical significant change.  SW and pt cont to work on getting documentation to [facility] office and waiting for follow up from them.    Medication regimen as follows, no changes as of 2/10/2025:  Abilify 5 mg daily as mood adjunct   Zoloft 150 mg daily for depression and anxiety  Continue to encourage participation in group therapy, milieu therapy and occupational therapy.  Continue to assess for side effects of medications.  Continue collaboration with [facility] for medical co-morbidities as indicated.  Continue discussion with CM/SW to assist with obtaining collateral, disposition planning, and the implementation of patient-centered individualized plan of care.  Continue frequent safety checks and vitals per unit protocol.    Risks, benefits and possible side effects of Medications: Risks, benefits, and possible side effects of medications have previously been explained. No new medications at this time.      Legal status: 201    Disposition: to be determined, pending [facility] application for potential group home placement. OT Cognitive Evaluation completed: \"Pt would benefit from discharge to a supportive environment that can provide checks for safety and compliance with IADLs. \"   Although patient's mood has stabilized, they are currently awaiting group home placement.  Their ability to recognize safety hazards take medications and participate in IADLs are significantly impaired by their cognitive deficits compounded by their chronic mental health needs and would be at risk for significant decompensation without the structure and support of a group home setting.      No associated orders from this encounter found during lookback period of 72 hours.  "

## 2025-02-10 NOTE — NURSING NOTE
Patient is calm and cooperative upon approach. Patient is isolative to room and self. Patient denies SI/HI/AH/VH. Patient compliant with meds and meals.

## 2025-02-10 NOTE — NURSING NOTE
Mainly seclusive to room. Out for meal trays and currently attending group. Denies symptoms, compliant with medications. Waiting placement.

## 2025-02-10 NOTE — PROGRESS NOTES
"Progress Note - Behavioral Health   Name: Franco Roberson 39 y.o. male I MRN: [id number]  Unit/Bed#: -01 I Date of Admission: 5/14/2024   Date of Service: 2/10/2025 I Hospital Day: 272     Assessment & Plan  MDD (major depressive disorder), recurrent severe, without psychosis (HCC)  Patient continues to do well and the plan is to continue with the current treatment plan as mentioned below with no changes.    Patient continues to be pending group home placement. Otherwise no clinical significant change.  SW and pt cont to work on getting documentation to [facility] office and waiting for follow up from them.    Medication regimen as follows, no changes as of 2/10/2025:  Abilify 5 mg daily as mood adjunct   Zoloft 150 mg daily for depression and anxiety  Continue to encourage participation in group therapy, milieu therapy and occupational therapy.  Continue to assess for side effects of medications.  Continue collaboration with PATRICIA for medical co-morbidities as indicated.  Continue discussion with CM/SW to assist with obtaining collateral, disposition planning, and the implementation of patient-centered individualized plan of care.  Continue frequent safety checks and vitals per unit protocol.    Risks, benefits and possible side effects of Medications: Risks, benefits, and possible side effects of medications have previously been explained. No new medications at this time.      Legal status: 201    Disposition: to be determined, pending [facility] application for potential group home placement. OT Cognitive Evaluation completed: \"Pt would benefit from discharge to a supportive environment that can provide checks for safety and compliance with IADLs. \"   Although patient's mood has stabilized, they are currently awaiting group home placement.  Their ability to recognize safety hazards take medications and participate in IADLs are significantly impaired by their cognitive deficits compounded by their chronic mental health needs " and would be at risk for significant decompensation without the structure and support of a group home setting.      No associated orders from this encounter found during lookback period of 72 hours.  Autism spectrum disorder  Continue supportive care    No associated orders from this encounter found during lookback period of 72 hours.    Hyperlipidemia  - Lipitor 40 mg       Plan     Recommended Treatment:    - Encourage early mobility and having a structured day  - Provide frequent re-orientation, and cognitive stimulation  - Ensure assistive devices are in proper working order (eye-glasses, hearing aids)  - Encourage adequate hydration, nutrition and monitor bowel movements  - Maintain sleep-wake cycle: Uninterrupted sleep time; low-level lighting at night  - Fall precaution  - f/u [facility] recs regarding the medical problems   - Continue medication titration and treatment plan; adjust medication to optimize treatment response and as clinically indicated. .   - Observation: routine            - VS: as per unit protocol  - Diet: Regular diet  - Encourage group attendance and milieu therapy  - Dispo: To be determined     Scheduled medications:  Current Facility-Administered Medications   Medication Dose Route Frequency Provider Last Rate    acetaminophen  650 mg Oral Q6H PRN Basilio Brown MD      acetaminophen  650 mg Oral Q4H PRN Basilio Brown MD      acetaminophen  975 mg Oral Q6H PRN Basilio Brown MD      aluminum-magnesium hydroxide-simethicone  30 mL Oral Q4H PRN Basilio Brown MD      ARIPiprazole  5 mg Oral Daily Basilio Panda MD      Artificial Tears  1 drop Both Eyes Q3H PRN Basilio Brown MD      atorvastatin  40 mg Oral Daily With Dinner WALLY Baptiste      benztropine  1 mg Intramuscular Q4H PRN Max 6/day Basilio Brown MD      benztropine  1 mg Oral Q4H PRN Max 6/day Basilio Brown MD      Cholecalciferol  2,500 Units Oral Daily Dustin Mcallister MD       cyanocobalamin  1,000 mcg Oral Daily WALLY Baptiste      Diclofenac Sodium  2 g Topical 4x Daily PRN WALLY Baptiste      hydrOXYzine HCL  50 mg Oral Q6H PRN Max 4/day Basilio Brown MD      Or    diphenhydrAMINE  50 mg Intramuscular Q6H PRN Basilio Brown MD      diphenhydrAMINE-zinc acetate   Topical Daily PRN Raj Guerrero MD      famotidine  20 mg Oral BID LauraWALLY Tony      hydrOXYzine HCL  100 mg Oral Q6H PRN Max 4/day Basilio Brown MD      Or    LORazepam  2 mg Intramuscular Q6H PRN Basilio Brown MD      hydrOXYzine HCL  25 mg Oral Q6H PRN Max 4/day Basilio Brown MD      levothyroxine  37.5 mcg Oral Early Morning WALLY Baptiste      melatonin  6 mg Oral HS PRN WALLY Gomez      methocarbamol  500 mg Oral Q6H PRN WALLY Baptiste      OLANZapine  5 mg Oral Q4H PRN Max 3/day Basilio Brown MD      Or    OLANZapine  2.5 mg Intramuscular Q4H PRN Max 3/day Basilio Brown MD      OLANZapine  5 mg Oral Q3H PRN Max 3/day Basilio Brown MD      Or    OLANZapine  5 mg Intramuscular Q3H PRN Max 3/day Basilio Brown MD      OLANZapine  2.5 mg Oral Q4H PRN Max 6/day Basilio Brown MD      polyethylene glycol  17 g Oral Daily PRN Basilio Brown MD      propranolol  10 mg Oral Q8H PRN Basilio Brown MD      senna-docusate sodium  2 tablet Oral After Dinner Dustin Mcallister MD      sertraline  150 mg Oral Daily WALLY Gomez      traZODone  50 mg Oral HS PRN Basilio Brown MD        PRN:    acetaminophen    acetaminophen    acetaminophen    aluminum-magnesium hydroxide-simethicone    Artificial Tears    benztropine    benztropine    Diclofenac Sodium    hydrOXYzine HCL **OR** diphenhydrAMINE    diphenhydrAMINE-zinc acetate    hydrOXYzine HCL **OR** LORazepam    hydrOXYzine HCL    melatonin    methocarbamol    OLANZapine **OR** OLANZapine    OLANZapine **OR**  "OLANZapine    OLANZapine    polyethylene glycol    propranolol    traZODone       Subjective     Patient was visited on unit for continuing care; chart reviewed and discussed with multidisciplinary treatment team.  On approach, the patient was calm and cooperative. Denied any changes in mood, appetite, and energy level. No problem initiating and maintaining sleep.  Denied A/VH currently.  Denied SI/HI, intent or plan upon direct inquiry at this time.    Patient continues to be visible in the milieu and interacts with staff and peers. No reports of aggression or self-injurious behavior on unit. No PRN medications used in the past 24 hours.    Patient accepted all offered medications and no adverse effects of medications noted or reported.    Objective    Current Mental Status Evaluation:  Mental Status Exam  Appearance: casually dressed, consistent with stated age  Motor: no psychomotor retardation, no gait abnormalities  Behavior: cooperative, answers questions appropriately  Speech: soft, normal rhythm  Mood: \"doing good\"  Affect: blunted  Thought Process: linear and goal-oriented  Thought Content: denies delusions  Risk Potential: denies suicidal ideation, plan, or intent. Denies homicidal ideation  Perceptions: denies auditory hallucinations, denies visual hallucinations,   Sensorium: Oriented to person, place, time, and situation  Cognition: cognitive ability appears intact but was not quantitatively tested  Consciousness: alert and awake  Attention: intact, able to focus without difficulty  Insight: limited  Judgement: limited            Vital signs in last 24 hours:    Temp:  [97 °F (36.1 °C)-98 °F (36.7 °C)] 97 °F (36.1 °C)  HR:  [65-79] 65  BP: (106-117)/(66-67) 117/67  Resp:  [16] 16  SpO2:  [97 %-98 %] 98 %  O2 Device: None (Room air)    Psychiatric Review of Systems:  Medication adverse effects: none  Sleep: unchanged  Appetite: unchanged  Behaviors over the past 24 hours: unchanged    Laboratory results: "    I have personally reviewed all pertinent laboratory/tests results  No results found for this or any previous visit (from the past 48 hours).       Progress Toward Goals & Illness Status:   progressing, attends groups, participates in milieu therapy, mood is stabilizing, placement pending    Patient is not at goal. They are not yet ready for discharge. The patient's condition currently requires active psychopharmacological medication management, interdisciplinary coordination with case management, and the utilization of adjunctive milieu and group therapy to augment psychopharmacological efficacy. The patient's risk of morbidity, and progression or decompensation of psychiatric disease, is higher without this current treatment.     Next of Kin  Extended Emergency Contact Information  Primary Emergency Contact: Lois Roberson  Address: [address] [place] [address]  Home Phone: [phone]  Relation: Mother    Counseling / Coordination of Care  Patient's progress discussed with staff in treatment team meeting.  Medications, treatment progress and treatment plan reviewed with patient.  Medication education provided to patient.  Educated on importance of medication and treatment compliance.  Supportive therapy provided to patient.  Cognitive techniques utilized during the session.  Reassurance and supportive therapy provided.  Reoriented to reality and reassured.  Encouraged participation in milieu and group therapy on the unit.  Crisis/safety plan discussed with patient.       Dustin Mcallister MD  Attending Psychiatrist   [facility]      This note has been constructed using a voice recognition system. There may be translation, syntax, or grammatical errors. If you have any questions, please contact the dictating provider.

## 2025-02-11 PROCEDURE — 99232 SBSQ HOSP IP/OBS MODERATE 35: CPT | Performed by: PSYCHIATRY & NEUROLOGY

## 2025-02-11 RX ADMIN — CYANOCOBALAMIN TAB 1000 MCG 1000 MCG: 1000 TAB at 08:11

## 2025-02-11 RX ADMIN — Medication 2500 UNITS: at 08:11

## 2025-02-11 RX ADMIN — ATORVASTATIN CALCIUM 40 MG: 40 TABLET, FILM COATED ORAL at 17:14

## 2025-02-11 RX ADMIN — ARIPIPRAZOLE 5 MG: 5 TABLET ORAL at 08:11

## 2025-02-11 RX ADMIN — FAMOTIDINE 20 MG: 20 TABLET ORAL at 08:11

## 2025-02-11 RX ADMIN — FAMOTIDINE 20 MG: 20 TABLET ORAL at 17:14

## 2025-02-11 RX ADMIN — LEVOTHYROXINE SODIUM 37.5 MCG: 0.07 TABLET ORAL at 06:45

## 2025-02-11 RX ADMIN — SERTRALINE HYDROCHLORIDE 150 MG: 100 TABLET ORAL at 08:11

## 2025-02-11 RX ADMIN — SENNOSIDES AND DOCUSATE SODIUM 2 TABLET: 50; 8.6 TABLET ORAL at 17:14

## 2025-02-11 NOTE — PROGRESS NOTES
"Progress Note - Behavioral Health   Name: Franco Roberson 39 y.o. male I MRN: [id number]  Unit/Bed#: -01 I Date of Admission: 5/14/2024   Date of Service: 2/11/2025 I Hospital Day: 273     Assessment & Plan  MDD (major depressive disorder), recurrent severe, without psychosis (HCC)  Patient continues to do well and the plan is to continue with the current treatment plan as mentioned below with no changes.    Patient continues to be pending group home placement. Otherwise no clinical significant change.  SW and pt cont to work on getting documentation to [facility] office and waiting for follow up from them.    Medication regimen as follows, no changes as of 2/11/2025:  Abilify 5 mg daily as mood adjunct   Zoloft 150 mg daily for depression and anxiety  Continue to encourage participation in group therapy, milieu therapy and occupational therapy.  Continue to assess for side effects of medications.  Continue collaboration with PATRICIA for medical co-morbidities as indicated.  Continue discussion with CM/SW to assist with obtaining collateral, disposition planning, and the implementation of patient-centered individualized plan of care.  Continue frequent safety checks and vitals per unit protocol.    Risks, benefits and possible side effects of Medications: Risks, benefits, and possible side effects of medications have previously been explained. No new medications at this time.      Legal status: 201    Disposition: to be determined, pending [facility] application for potential group home placement. OT Cognitive Evaluation completed: \"Pt would benefit from discharge to a supportive environment that can provide checks for safety and compliance with IADLs. \"   Although patient's mood has stabilized, they are currently awaiting group home placement.  Their ability to recognize safety hazards take medications and participate in IADLs are significantly impaired by their cognitive deficits compounded by their chronic mental health needs " and would be at risk for significant decompensation without the structure and support of a group home setting.      No associated orders from this encounter found during lookback period of 72 hours.  Autism spectrum disorder  Continue supportive care    No associated orders from this encounter found during lookback period of 72 hours.    Hyperlipidemia  - Lipitor 40 mg       Plan     Recommended Treatment:    - Encourage early mobility and having a structured day  - Provide frequent re-orientation, and cognitive stimulation  - Ensure assistive devices are in proper working order (eye-glasses, hearing aids)  - Encourage adequate hydration, nutrition and monitor bowel movements  - Maintain sleep-wake cycle: Uninterrupted sleep time; low-level lighting at night  - Fall precaution  - f/u [facility] recs regarding the medical problems   - Continue medication titration and treatment plan; adjust medication to optimize treatment response and as clinically indicated. .   - Observation: routine            - VS: as per unit protocol  - Diet: Regular diet  - Encourage group attendance and milieu therapy  - Dispo: To be determined     Scheduled medications:  Current Facility-Administered Medications   Medication Dose Route Frequency Provider Last Rate    acetaminophen  650 mg Oral Q6H PRN Basilio Brown MD      acetaminophen  650 mg Oral Q4H PRN Basilio Brown MD      acetaminophen  975 mg Oral Q6H PRN Basilio Brown MD      aluminum-magnesium hydroxide-simethicone  30 mL Oral Q4H PRN Basilio Brown MD      ARIPiprazole  5 mg Oral Daily Basilio Panda MD      Artificial Tears  1 drop Both Eyes Q3H PRN Basilio Brown MD      atorvastatin  40 mg Oral Daily With Dinner WALLY Baptiste      benztropine  1 mg Intramuscular Q4H PRN Max 6/day Basilio Brown MD      benztropine  1 mg Oral Q4H PRN Max 6/day Basilio Brown MD      Cholecalciferol  2,500 Units Oral Daily Dustin Mcallister MD       cyanocobalamin  1,000 mcg Oral Daily WALLY Baptiste      Diclofenac Sodium  2 g Topical 4x Daily PRN WALLY Baptiste      hydrOXYzine HCL  50 mg Oral Q6H PRN Max 4/day Basilio Brown MD      Or    diphenhydrAMINE  50 mg Intramuscular Q6H PRN Basilio Brown MD      diphenhydrAMINE-zinc acetate   Topical Daily PRN Raj Guerrero MD      famotidine  20 mg Oral BID LauraWALLY Tony      hydrOXYzine HCL  100 mg Oral Q6H PRN Max 4/day Basilio Brown MD      Or    LORazepam  2 mg Intramuscular Q6H PRN Basilio Brown MD      hydrOXYzine HCL  25 mg Oral Q6H PRN Max 4/day Basilio Brown MD      levothyroxine  37.5 mcg Oral Early Morning WALLY Baptiste      melatonin  6 mg Oral HS PRN WALLY Gomez      methocarbamol  500 mg Oral Q6H PRN WALLY Baptiste      OLANZapine  5 mg Oral Q4H PRN Max 3/day Basilio Brown MD      Or    OLANZapine  2.5 mg Intramuscular Q4H PRN Max 3/day Basilio Brown MD      OLANZapine  5 mg Oral Q3H PRN Max 3/day Basilio Brown MD      Or    OLANZapine  5 mg Intramuscular Q3H PRN Max 3/day Basilio Brown MD      OLANZapine  2.5 mg Oral Q4H PRN Max 6/day Basilio Brown MD      polyethylene glycol  17 g Oral Daily PRN Basilio Brown MD      propranolol  10 mg Oral Q8H PRN Basilio Brown MD      senna-docusate sodium  2 tablet Oral After Dinner Dustin Mcallister MD      sertraline  150 mg Oral Daily WALLY Gomez      traZODone  50 mg Oral HS PRN Basilio Brown MD        PRN:    acetaminophen    acetaminophen    acetaminophen    aluminum-magnesium hydroxide-simethicone    Artificial Tears    benztropine    benztropine    Diclofenac Sodium    hydrOXYzine HCL **OR** diphenhydrAMINE    diphenhydrAMINE-zinc acetate    hydrOXYzine HCL **OR** LORazepam    hydrOXYzine HCL    melatonin    methocarbamol    OLANZapine **OR** OLANZapine    OLANZapine **OR**  "OLANZapine    OLANZapine    polyethylene glycol    propranolol    traZODone       Subjective     Patient was visited on unit for continuing care; chart reviewed and discussed with multidisciplinary treatment team.  On approach, the patient was calm and cooperative. Denied any changes in mood, appetite, and energy level. No problem initiating and maintaining sleep.  Denied A/VH currently.  Denied SI/HI, intent or plan upon direct inquiry at this time.    Patient continues to be visible in the milieu and interacts with staff and peers. No reports of aggression or self-injurious behavior on unit. No PRN medications used in the past 24 hours.    Patient accepted all offered medications and no adverse effects of medications noted or reported.    Objective    Current Mental Status Evaluation:  Mental Status Exam  Appearance: casually dressed, consistent with stated age  Motor: no psychomotor retardation, no gait abnormalities  Behavior: cooperative, answers questions appropriately  Speech: soft, normal rhythm  Mood: \"fine\"  Affect: blunted  Thought Process: linear and goal-oriented  Thought Content: denies delusions  Risk Potential: denies suicidal ideation, plan, or intent. Denies homicidal ideation  Perceptions: denies auditory hallucinations, denies visual hallucinations,   Sensorium: Oriented to person, place, time, and situation  Cognition: cognitive ability appears intact but was not quantitatively tested  Consciousness: alert and awake  Attention: intact, able to focus without difficulty  Insight: limited  Judgement: limited            Vital signs in last 24 hours:    Temp:  [96.9 °F (36.1 °C)-97.5 °F (36.4 °C)] 97.5 °F (36.4 °C)  HR:  [62-87] 62  BP: (107-124)/(75-86) 107/75  Resp:  [17-18] 18  SpO2:  [96 %] 96 %  O2 Device: None (Room air)    Psychiatric Review of Systems:  Medication adverse effects: none  Sleep: unchanged  Appetite: unchanged  Behaviors over the past 24 hours: unchanged    Laboratory results:  "   I have personally reviewed all pertinent laboratory/tests results  No results found for this or any previous visit (from the past 48 hours).       Progress Toward Goals & Illness Status:   progressing, attends groups, participates in milieu therapy, mood is stabilizing, placement pending    Patient is not at goal. They are not yet ready for discharge. The patient's condition currently requires active psychopharmacological medication management, interdisciplinary coordination with case management, and the utilization of adjunctive milieu and group therapy to augment psychopharmacological efficacy. The patient's risk of morbidity, and progression or decompensation of psychiatric disease, is higher without this current treatment.     Next of Kin  Extended Emergency Contact Information  Primary Emergency Contact: Lois Roberson  Address: [address] [place] [address]  Home Phone: [phone]  Relation: Mother    Counseling / Coordination of Care  Patient's progress discussed with staff in treatment team meeting.  Medications, treatment progress and treatment plan reviewed with patient.  Medication education provided to patient.  Educated on importance of medication and treatment compliance.  Supportive therapy provided to patient.  Cognitive techniques utilized during the session.  Reassurance and supportive therapy provided.  Reoriented to reality and reassured.  Encouraged participation in milieu and group therapy on the unit.  Crisis/safety plan discussed with patient.       Dustin Mcallister MD  Attending Psychiatrist   [facility]      This note has been constructed using a voice recognition system. There may be translation, syntax, or grammatical errors. If you have any questions, please contact the dictating provider.

## 2025-02-11 NOTE — PLAN OF CARE
Problem: Ineffective Coping  Goal: Participates in unit activities  Description: Interventions:  - Provide therapeutic environment   - Provide required programming   - Redirect inappropriate behaviors   Outcome: Progressing     Problem: Depression  Goal: Treatment Goal: Demonstrate behavioral control of depressive symptoms, verbalize feelings of improved mood/affect, and adopt new coping skills prior to discharge  Outcome: Progressing  Goal: Verbalize thoughts and feelings  Description: Interventions:  - Assess and re-assess patient's level of risk   - Engage patient in 1:1 interactions, daily, for a minimum of 15 minutes   - Encourage patient to express feelings, fears, frustrations, hopes   Outcome: Progressing  Goal: Refrain from harming self  Description: Interventions:  - Monitor patient closely, per order   - Supervise medication ingestion, monitor effects and side effects   Outcome: Progressing  Goal: Refrain from isolation  Description: Interventions:  - Develop a trusting relationship   - Encourage socialization   Outcome: Progressing     Problem: Anxiety  Goal: Anxiety is at manageable level  Description: Interventions:  - Assess and monitor patient's anxiety level.   - Monitor for signs and symptoms (heart palpitations, chest pain, shortness of breath, headaches, nausea, feeling jumpy, restlessness, irritable, apprehensive).   - Collaborate with interdisciplinary team and initiate plan and interventions as ordered.  - [place] patient to unit/surroundings  - Explain treatment plan  - Encourage participation in care  - Encourage verbalization of concerns/fears  - Identify coping mechanisms  - Assist in developing anxiety-reducing skills  - Administer/offer alternative therapies  - Limit or eliminate stimulants  Outcome: Progressing     Problem: Knowledge Deficit  Goal: Patient/family/caregiver demonstrates understanding of disease process, treatment plan, medications, and discharge  instructions  Description: Complete learning assessment and assess knowledge base.  Interventions:  - Provide teaching at level of understanding  - Provide teaching via preferred learning methods  Outcome: Progressing     Problem: SLEEP DISTURBANCE  Goal: Will exhibit normal sleeping pattern  Description: Interventions:  -  Assess the patients sleep pattern, noting recent changes  - Administer medication as ordered  - Decrease environmental stimuli, including noise, as appropriate during the night  - Encourage the patient to actively participate in unit groups and or exercise during the day to enhance ability to achieve adequate sleep at night  - Assess the patient, in the morning, encouraging a description of sleep experience  Outcome: Progressing

## 2025-02-11 NOTE — NURSING NOTE
Pleasant, calm and cooperative. Denies symptoms. No complaints. Compliant with medications. Waiting placement.

## 2025-02-11 NOTE — ASSESSMENT & PLAN NOTE
"Patient continues to do well and the plan is to continue with the current treatment plan as mentioned below with no changes.    Patient continues to be pending group home placement. Otherwise no clinical significant change.  SW and pt cont to work on getting documentation to [facility] office and waiting for follow up from them.    Medication regimen as follows, no changes as of 2/11/2025:  Abilify 5 mg daily as mood adjunct   Zoloft 150 mg daily for depression and anxiety  Continue to encourage participation in group therapy, milieu therapy and occupational therapy.  Continue to assess for side effects of medications.  Continue collaboration with [facility] for medical co-morbidities as indicated.  Continue discussion with CM/SW to assist with obtaining collateral, disposition planning, and the implementation of patient-centered individualized plan of care.  Continue frequent safety checks and vitals per unit protocol.    Risks, benefits and possible side effects of Medications: Risks, benefits, and possible side effects of medications have previously been explained. No new medications at this time.      Legal status: 201    Disposition: to be determined, pending [facility] application for potential group home placement. OT Cognitive Evaluation completed: \"Pt would benefit from discharge to a supportive environment that can provide checks for safety and compliance with IADLs. \"   Although patient's mood has stabilized, they are currently awaiting group home placement.  Their ability to recognize safety hazards take medications and participate in IADLs are significantly impaired by their cognitive deficits compounded by their chronic mental health needs and would be at risk for significant decompensation without the structure and support of a group home setting.      No associated orders from this encounter found during lookback period of 72 hours.  "

## 2025-02-12 PROCEDURE — 99232 SBSQ HOSP IP/OBS MODERATE 35: CPT | Performed by: PSYCHIATRY & NEUROLOGY

## 2025-02-12 RX ADMIN — FAMOTIDINE 20 MG: 20 TABLET ORAL at 17:35

## 2025-02-12 RX ADMIN — SERTRALINE HYDROCHLORIDE 150 MG: 100 TABLET ORAL at 08:11

## 2025-02-12 RX ADMIN — SENNOSIDES AND DOCUSATE SODIUM 2 TABLET: 50; 8.6 TABLET ORAL at 17:35

## 2025-02-12 RX ADMIN — ARIPIPRAZOLE 5 MG: 5 TABLET ORAL at 08:11

## 2025-02-12 RX ADMIN — FAMOTIDINE 20 MG: 20 TABLET ORAL at 08:12

## 2025-02-12 RX ADMIN — LEVOTHYROXINE SODIUM 37.5 MCG: 0.07 TABLET ORAL at 06:39

## 2025-02-12 RX ADMIN — ATORVASTATIN CALCIUM 40 MG: 40 TABLET, FILM COATED ORAL at 17:36

## 2025-02-12 RX ADMIN — CYANOCOBALAMIN TAB 1000 MCG 1000 MCG: 1000 TAB at 08:11

## 2025-02-12 RX ADMIN — Medication 2500 UNITS: at 08:11

## 2025-02-12 NOTE — PROGRESS NOTES
"Progress Note - Behavioral Health   Name: Franco Roberson 39 y.o. male I MRN: [id number]  Unit/Bed#: -01 I Date of Admission: 5/14/2024   Date of Service: 2/12/2025 I Hospital Day: 274     Assessment & Plan  MDD (major depressive disorder), recurrent severe, without psychosis (HCC)  Patient continues to do well and the plan is to continue with the current treatment plan as mentioned below with no changes.    Patient continues to be pending group home placement. Otherwise no clinical significant change.  SW and pt cont to work on getting documentation to [facility] office and waiting for follow up from them.    Medication regimen as follows, no changes as of 2/12/2025:  Abilify 5 mg daily as mood adjunct   Zoloft 150 mg daily for depression and anxiety  Continue to encourage participation in group therapy, milieu therapy and occupational therapy.  Continue to assess for side effects of medications.  Continue collaboration with PATR[facility] for medical co-morbidities as indicated.  Continue discussion with CM/SW to assist with obtaining collateral, disposition planning, and the implementation of patient-centered individualized plan of care.  Continue frequent safety checks and vitals per unit protocol.    Risks, benefits and possible side effects of Medications: Risks, benefits, and possible side effects of medications have previously been explained. No new medications at this time.      Legal status: 201    Disposition: to be determined, pending [facility] application for potential group home placement. OT Cognitive Evaluation completed: \"Pt would benefit from discharge to a supportive environment that can provide checks for safety and compliance with IADLs. \"   Although patient's mood has stabilized, they are currently awaiting group home placement.  Their ability to recognize safety hazards take medications and participate in IADLs are significantly impaired by their cognitive deficits compounded by their chronic mental health needs " and would be at risk for significant decompensation without the structure and support of a group home setting.      No associated orders from this encounter found during lookback period of 72 hours.  Autism spectrum disorder  Continue supportive care    No associated orders from this encounter found during lookback period of 72 hours.    Hyperlipidemia  - Lipitor 40 mg       Plan     Recommended Treatment:    - Encourage early mobility and having a structured day  - Provide frequent re-orientation, and cognitive stimulation  - Ensure assistive devices are in proper working order (eye-glasses, hearing aids)  - Encourage adequate hydration, nutrition and monitor bowel movements  - Maintain sleep-wake cycle: Uninterrupted sleep time; low-level lighting at night  - Fall precaution  - f/u [facility] recs regarding the medical problems   - Continue medication titration and treatment plan; adjust medication to optimize treatment response and as clinically indicated. .   - Observation: routine            - VS: as per unit protocol  - Diet: Regular diet  - Encourage group attendance and milieu therapy  - Dispo: To be determined     Scheduled medications:  Current Facility-Administered Medications   Medication Dose Route Frequency Provider Last Rate    acetaminophen  650 mg Oral Q6H PRN Basilio Brown MD      acetaminophen  650 mg Oral Q4H PRN Basilio Brown MD      acetaminophen  975 mg Oral Q6H PRN Basilio Brown MD      aluminum-magnesium hydroxide-simethicone  30 mL Oral Q4H PRN Basilio Brown MD      ARIPiprazole  5 mg Oral Daily Basilio Panda MD      Artificial Tears  1 drop Both Eyes Q3H PRN Basilio Brown MD      atorvastatin  40 mg Oral Daily With Dinner WALLY Baptiste      benztropine  1 mg Intramuscular Q4H PRN Max 6/day Basilio Brown MD      benztropine  1 mg Oral Q4H PRN Max 6/day Basilio Brown MD      Cholecalciferol  2,500 Units Oral Daily Dustin Mcallister MD       cyanocobalamin  1,000 mcg Oral Daily WALLY Baptiste      Diclofenac Sodium  2 g Topical 4x Daily PRN WALLY Baptiste      hydrOXYzine HCL  50 mg Oral Q6H PRN Max 4/day Basilio Brown MD      Or    diphenhydrAMINE  50 mg Intramuscular Q6H PRN Basilio Brown MD      diphenhydrAMINE-zinc acetate   Topical Daily PRN Raj Guerrero MD      famotidine  20 mg Oral BID LauraWALLY Tony      hydrOXYzine HCL  100 mg Oral Q6H PRN Max 4/day Basilio Brown MD      Or    LORazepam  2 mg Intramuscular Q6H PRN Basilio Brown MD      hydrOXYzine HCL  25 mg Oral Q6H PRN Max 4/day Basilio Brown MD      levothyroxine  37.5 mcg Oral Early Morning WALLY Baptiste      melatonin  6 mg Oral HS PRN WALLY Gomez      methocarbamol  500 mg Oral Q6H PRN WALLY Baptiste      OLANZapine  5 mg Oral Q4H PRN Max 3/day Basilio Brown MD      Or    OLANZapine  2.5 mg Intramuscular Q4H PRN Max 3/day Basilio Brown MD      OLANZapine  5 mg Oral Q3H PRN Max 3/day Basilio Brown MD      Or    OLANZapine  5 mg Intramuscular Q3H PRN Max 3/day Basilio Brown MD      OLANZapine  2.5 mg Oral Q4H PRN Max 6/day Basilio Brown MD      polyethylene glycol  17 g Oral Daily PRN Basilio Brown MD      propranolol  10 mg Oral Q8H PRN Basilio Brown MD      senna-docusate sodium  2 tablet Oral After Dinner Dustin Mcallister MD      sertraline  150 mg Oral Daily WALLY Gomez      traZODone  50 mg Oral HS PRN Basilio Brown MD        PRN:    acetaminophen    acetaminophen    acetaminophen    aluminum-magnesium hydroxide-simethicone    Artificial Tears    benztropine    benztropine    Diclofenac Sodium    hydrOXYzine HCL **OR** diphenhydrAMINE    diphenhydrAMINE-zinc acetate    hydrOXYzine HCL **OR** LORazepam    hydrOXYzine HCL    melatonin    methocarbamol    OLANZapine **OR** OLANZapine    OLANZapine **OR**  "OLANZapine    OLANZapine    polyethylene glycol    propranolol    traZODone       Subjective     Patient was visited on unit for continuing care; chart reviewed and discussed with multidisciplinary treatment team.  On approach, the patient was calm and cooperative. Denied any changes in mood, appetite, and energy level. No problem initiating and maintaining sleep.  Denied A/VH currently.  Denied SI/HI, intent or plan upon direct inquiry at this time. Met w/  yesterday for updates to placement. Still not date yet.    Patient continues to be visible in the milieu and interacts with staff and peers. No reports of aggression or self-injurious behavior on unit. No PRN medications used in the past 24 hours.    Patient accepted all offered medications and no adverse effects of medications noted or reported.    Objective    Current Mental Status Evaluation:  Appearance: casually dressed, consistent with stated age  Motor: no psychomotor retardation, no gait abnormalities  Behavior: cooperative, answers questions appropriately  Speech: soft, normal rhythm  Mood: \"good\"  Affect: blunted  Thought Process: linear and goal-oriented  Thought Content: denies delusions  Risk Potential: denies suicidal ideation, plan, or intent. Denies homicidal ideation  Perceptions: denies auditory hallucinations, denies visual hallucinations,   Sensorium: Oriented to person, place, time, and situation  Cognition: cognitive ability appears intact but was not quantitatively tested  Consciousness: alert and awake  Attention: intact, able to focus without difficulty  Insight: limited  Judgement: limited            Vital signs in last 24 hours:    Temp:  [96.8 °F (36 °C)-97.5 °F (36.4 °C)] 96.8 °F (36 °C)  HR:  [62-76] 62  BP: (106-135)/(57-80) 106/57  Resp:  [16-17] 16  SpO2:  [96 %-97 %] 97 %  O2 Device: None (Room air)    Psychiatric Review of Systems:  Medication adverse effects: none  Sleep: unchanged  Appetite: unchanged  Behaviors " over the past 24 hours: unchanged    Laboratory results:    I have personally reviewed all pertinent laboratory/tests results  No results found for this or any previous visit (from the past 48 hours).       Progress Toward Goals & Illness Status:   progressing, attends groups, participates in milieu therapy, placement pending    Patient is not at goal. They are not yet ready for discharge. The patient's condition currently requires active psychopharmacological medication management, interdisciplinary coordination with case management, and the utilization of adjunctive milieu and group therapy to augment psychopharmacological efficacy. The patient's risk of morbidity, and progression or decompensation of psychiatric disease, is higher without this current treatment.     Next of Kin  Extended Emergency Contact Information  Primary Emergency Contact: Lois Roberson  Address: [address] [place] [address]  Home Phone: [phone]  Relation: Mother    Counseling / Coordination of Care  Patient's progress discussed with staff in treatment team meeting.  Medications, treatment progress and treatment plan reviewed with patient.  Medication education provided to patient.  Educated on importance of medication and treatment compliance.  Supportive therapy provided to patient.  Cognitive techniques utilized during the session.  Reassurance and supportive therapy provided.  Reoriented to reality and reassured.  Encouraged participation in milieu and group therapy on the unit.  Crisis/safety plan discussed with patient.       Dustin Mcallister MD  Attending Psychiatrist   [facility]      This note has been constructed using a voice recognition system. There may be translation, syntax, or grammatical errors. If you have any questions, please contact the dictating provider.

## 2025-02-12 NOTE — ASSESSMENT & PLAN NOTE
"Patient continues to do well and the plan is to continue with the current treatment plan as mentioned below with no changes.    Patient continues to be pending group home placement. Otherwise no clinical significant change.  SW and pt cont to work on getting documentation to [facility] office and waiting for follow up from them.    Medication regimen as follows, no changes as of 2/12/2025:  Abilify 5 mg daily as mood adjunct   Zoloft 150 mg daily for depression and anxiety  Continue to encourage participation in group therapy, milieu therapy and occupational therapy.  Continue to assess for side effects of medications.  Continue collaboration with [facility] for medical co-morbidities as indicated.  Continue discussion with CM/SW to assist with obtaining collateral, disposition planning, and the implementation of patient-centered individualized plan of care.  Continue frequent safety checks and vitals per unit protocol.    Risks, benefits and possible side effects of Medications: Risks, benefits, and possible side effects of medications have previously been explained. No new medications at this time.      Legal status: 201    Disposition: to be determined, pending [facility] application for potential group home placement. OT Cognitive Evaluation completed: \"Pt would benefit from discharge to a supportive environment that can provide checks for safety and compliance with IADLs. \"   Although patient's mood has stabilized, they are currently awaiting group home placement.  Their ability to recognize safety hazards take medications and participate in IADLs are significantly impaired by their cognitive deficits compounded by their chronic mental health needs and would be at risk for significant decompensation without the structure and support of a group home setting.      No associated orders from this encounter found during lookback period of 72 hours.  "

## 2025-02-12 NOTE — NURSING NOTE
"Cooperative, mainly seclusive to his room. Pleasant. Reports feeling \"ok\". Denying symptoms. Waiting placement.   "

## 2025-02-12 NOTE — NURSING NOTE
Patient is calm and cooperative upon approach. Patient is visible on unit, isolative to self. Patient denies SI/HI/AH/VH. Patient is compliant with meds and meals

## 2025-02-12 NOTE — PLAN OF CARE
Problem: Ineffective Coping  Goal: Participates in unit activities  Description: Interventions:  - Provide therapeutic environment   - Provide required programming   - Redirect inappropriate behaviors   Outcome: Progressing     Problem: Depression  Goal: Treatment Goal: Demonstrate behavioral control of depressive symptoms, verbalize feelings of improved mood/affect, and adopt new coping skills prior to discharge  Outcome: Progressing  Goal: Verbalize thoughts and feelings  Description: Interventions:  - Assess and re-assess patient's level of risk   - Engage patient in 1:1 interactions, daily, for a minimum of 15 minutes   - Encourage patient to express feelings, fears, frustrations, hopes   Outcome: Progressing  Goal: Refrain from harming self  Description: Interventions:  - Monitor patient closely, per order   - Supervise medication ingestion, monitor effects and side effects   Outcome: Progressing  Goal: Refrain from isolation  Description: Interventions:  - Develop a trusting relationship   - Encourage socialization   Outcome: Progressing     Problem: DISCHARGE PLANNING - CARE MANAGEMENT  Goal: Discharge to post-acute care or home with appropriate resources  Description: INTERVENTIONS:  - Conduct assessment to determine patient/family and health care team treatment goals, and need for post-acute services based on payer coverage, community resources, and patient preferences, and barriers to discharge  - Address psychosocial, clinical, and financial barriers to discharge as identified in assessment in conjunction with the patient/family and health care team  - Arrange appropriate level of post-acute services according to patient’s   needs and preference and payer coverage in collaboration with the physician and health care team  - Communicate with and update the patient/family, physician, and health care team regarding progress on the discharge plan  - Arrange appropriate transportation to post-acute  venues  Outcome: Progressing     Problem: Knowledge Deficit  Goal: Patient/family/caregiver demonstrates understanding of disease process, treatment plan, medications, and discharge instructions  Description: Complete learning assessment and assess knowledge base.  Interventions:  - Provide teaching at level of understanding  - Provide teaching via preferred learning methods  Outcome: Progressing     Problem: SLEEP DISTURBANCE  Goal: Will exhibit normal sleeping pattern  Description: Interventions:  -  Assess the patients sleep pattern, noting recent changes  - Administer medication as ordered  - Decrease environmental stimuli, including noise, as appropriate during the night  - Encourage the patient to actively participate in unit groups and or exercise during the day to enhance ability to achieve adequate sleep at night  - Assess the patient, in the morning, encouraging a description of sleep experience  Outcome: Progressing

## 2025-02-12 NOTE — PLAN OF CARE
Problem: Ineffective Coping  Goal: Identifies ineffective coping skills  Outcome: Progressing  Goal: Identifies healthy coping skills  Outcome: Progressing  Goal: Demonstrates healthy coping skills  Outcome: Progressing  Goal: Participates in unit activities  Description: Interventions:  - Provide therapeutic environment   - Provide required programming   - Redirect inappropriate behaviors   Outcome: Progressing     Problem: Risk for Self Injury/Neglect  Goal: Treatment Goal: Remain safe during length of stay, learn and adopt new coping skills, and be free of self-injurious ideation, impulses and acts at the time of discharge  Outcome: Progressing     Problem: Depression  Goal: Treatment Goal: Demonstrate behavioral control of depressive symptoms, verbalize feelings of improved mood/affect, and adopt new coping skills prior to discharge  Outcome: Progressing  Goal: Verbalize thoughts and feelings  Description: Interventions:  - Assess and re-assess patient's level of risk   - Engage patient in 1:1 interactions, daily, for a minimum of 15 minutes   - Encourage patient to express feelings, fears, frustrations, hopes   Outcome: Progressing  Goal: Refrain from harming self  Description: Interventions:  - Monitor patient closely, per order   - Supervise medication ingestion, monitor effects and side effects   Outcome: Progressing  Goal: Refrain from isolation  Description: Interventions:  - Develop a trusting relationship   - Encourage socialization   Outcome: Progressing  Goal: Refrain from self-neglect  Outcome: Progressing  Goal: Attend and participate in unit activities, including therapeutic, recreational, and educational groups  Description: Interventions:  - Provide therapeutic and educational activities daily, encourage attendance and participation, and document same in the medical record   Outcome: Progressing  Goal: Complete daily ADLs, including personal hygiene independently, as able  Description:  Interventions:  - Observe, teach, and assist patient with ADLS  -  Monitor and promote a balance of rest/activity, with adequate nutrition and elimination   Outcome: Progressing     Problem: Anxiety  Goal: Anxiety is at manageable level  Description: Interventions:  - Assess and monitor patient's anxiety level.   - Monitor for signs and symptoms (heart palpitations, chest pain, shortness of breath, headaches, nausea, feeling jumpy, restlessness, irritable, apprehensive).   - Collaborate with interdisciplinary team and initiate plan and interventions as ordered.  - [place] patient to unit/surroundings  - Explain treatment plan  - Encourage participation in care  - Encourage verbalization of concerns/fears  - Identify coping mechanisms  - Assist in developing anxiety-reducing skills  - Administer/offer alternative therapies  - Limit or eliminate stimulants  Outcome: Progressing     Problem: DISCHARGE PLANNING - CARE MANAGEMENT  Goal: Discharge to post-acute care or home with appropriate resources  Description: INTERVENTIONS:  - Conduct assessment to determine patient/family and health care team treatment goals, and need for post-acute services based on payer coverage, community resources, and patient preferences, and barriers to discharge  - Address psychosocial, clinical, and financial barriers to discharge as identified in assessment in conjunction with the patient/family and health care team  - Arrange appropriate level of post-acute services according to patient’s   needs and preference and payer coverage in collaboration with the physician and health care team  - Communicate with and update the patient/family, physician, and health care team regarding progress on the discharge plan  - Arrange appropriate transportation to post-acute venues  Outcome: Progressing     Problem: Knowledge Deficit  Goal: Patient/family/caregiver demonstrates understanding of disease process, treatment plan, medications, and discharge  instructions  Description: Complete learning assessment and assess knowledge base.  Interventions:  - Provide teaching at level of understanding  - Provide teaching via preferred learning methods  Outcome: Progressing     Problem: SLEEP DISTURBANCE  Goal: Will exhibit normal sleeping pattern  Description: Interventions:  -  Assess the patients sleep pattern, noting recent changes  - Administer medication as ordered  - Decrease environmental stimuli, including noise, as appropriate during the night  - Encourage the patient to actively participate in unit groups and or exercise during the day to enhance ability to achieve adequate sleep at night  - Assess the patient, in the morning, encouraging a description of sleep experience  Outcome: Progressing      yes

## 2025-02-12 NOTE — PROGRESS NOTES
02/12/25 0842   Team Meeting   Meeting Type Daily Rounds   Team Members Present   Team Members Present Physician;Nurse;   Physician Team Member Ary   [facility] Suhas   Patient/Family Present   Patient Present No   Patient's Family Present No     Pt med/meal compliant. Visible on the unit. Pleasant, calm, cooperative. Discharge pending placement.

## 2025-02-13 PROCEDURE — 99232 SBSQ HOSP IP/OBS MODERATE 35: CPT | Performed by: PSYCHIATRY & NEUROLOGY

## 2025-02-13 RX ADMIN — Medication 2500 UNITS: at 08:22

## 2025-02-13 RX ADMIN — ARIPIPRAZOLE 5 MG: 5 TABLET ORAL at 08:22

## 2025-02-13 RX ADMIN — ATORVASTATIN CALCIUM 40 MG: 40 TABLET, FILM COATED ORAL at 18:05

## 2025-02-13 RX ADMIN — SERTRALINE HYDROCHLORIDE 150 MG: 100 TABLET ORAL at 08:22

## 2025-02-13 RX ADMIN — FAMOTIDINE 20 MG: 20 TABLET ORAL at 18:05

## 2025-02-13 RX ADMIN — LEVOTHYROXINE SODIUM 37.5 MCG: 0.07 TABLET ORAL at 06:15

## 2025-02-13 RX ADMIN — FAMOTIDINE 20 MG: 20 TABLET ORAL at 08:22

## 2025-02-13 RX ADMIN — CYANOCOBALAMIN TAB 1000 MCG 1000 MCG: 1000 TAB at 08:22

## 2025-02-13 RX ADMIN — SENNOSIDES AND DOCUSATE SODIUM 2 TABLET: 50; 8.6 TABLET ORAL at 18:05
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
## 2025-02-13 NOTE — PLAN OF CARE
Problem: Ineffective Coping  Goal: Participates in unit activities  Description: Interventions:  - Provide therapeutic environment   - Provide required programming   - Redirect inappropriate behaviors   Outcome: Progressing     Problem: Depression  Goal: Treatment Goal: Demonstrate behavioral control of depressive symptoms, verbalize feelings of improved mood/affect, and adopt new coping skills prior to discharge  Outcome: Progressing  Goal: Verbalize thoughts and feelings  Description: Interventions:  - Assess and re-assess patient's level of risk   - Engage patient in 1:1 interactions, daily, for a minimum of 15 minutes   - Encourage patient to express feelings, fears, frustrations, hopes   Outcome: Progressing  Goal: Refrain from harming self  Description: Interventions:  - Monitor patient closely, per order   - Supervise medication ingestion, monitor effects and side effects   Outcome: Progressing  Goal: Refrain from isolation  Description: Interventions:  - Develop a trusting relationship   - Encourage socialization   Outcome: Progressing     Problem: Anxiety  Goal: Anxiety is at manageable level  Description: Interventions:  - Assess and monitor patient's anxiety level.   - Monitor for signs and symptoms (heart palpitations, chest pain, shortness of breath, headaches, nausea, feeling jumpy, restlessness, irritable, apprehensive).   - Collaborate with interdisciplinary team and initiate plan and interventions as ordered.  - [place] patient to unit/surroundings  - Explain treatment plan  - Encourage participation in care  - Encourage verbalization of concerns/fears  - Identify coping mechanisms  - Assist in developing anxiety-reducing skills  - Administer/offer alternative therapies  - Limit or eliminate stimulants  Outcome: Progressing     Problem: DISCHARGE PLANNING - CARE MANAGEMENT  Goal: Discharge to post-acute care or home with appropriate resources  Description: INTERVENTIONS:  - Conduct assessment to  determine patient/family and health care team treatment goals, and need for post-acute services based on payer coverage, community resources, and patient preferences, and barriers to discharge  - Address psychosocial, clinical, and financial barriers to discharge as identified in assessment in conjunction with the patient/family and health care team  - Arrange appropriate level of post-acute services according to patient’s   needs and preference and payer coverage in collaboration with the physician and health care team  - Communicate with and update the patient/family, physician, and health care team regarding progress on the discharge plan  - Arrange appropriate transportation to post-acute venues  Outcome: Progressing     Problem: Knowledge Deficit  Goal: Patient/family/caregiver demonstrates understanding of disease process, treatment plan, medications, and discharge instructions  Description: Complete learning assessment and assess knowledge base.  Interventions:  - Provide teaching at level of understanding  - Provide teaching via preferred learning methods  Outcome: Progressing     Problem: SLEEP DISTURBANCE  Goal: Will exhibit normal sleeping pattern  Description: Interventions:  -  Assess the patients sleep pattern, noting recent changes  - Administer medication as ordered  - Decrease environmental stimuli, including noise, as appropriate during the night  - Encourage the patient to actively participate in unit groups and or exercise during the day to enhance ability to achieve adequate sleep at night  - Assess the patient, in the morning, encouraging a description of sleep experience  Outcome: Progressing

## 2025-02-13 NOTE — NURSING NOTE
More visible this shift. Doing laps in the [place] with peers and attending morning group. Pleasant and cooperative. Offers no complaints. Compliant. Waiting placement.

## 2025-02-13 NOTE — PROGRESS NOTES
"Progress Note - Behavioral Health   Name: Franco Roberson 39 y.o. male I MRN: [id number]  Unit/Bed#: -01 I Date of Admission: 5/14/2024   Date of Service: 2/13/2025 I Hospital Day: 275     Assessment & Plan  MDD (major depressive disorder), recurrent severe, without psychosis (HCC)  Patient continues to do well and the plan is to continue with the current treatment plan as mentioned below with no changes.    Patient continues to be pending group home placement. Otherwise no clinical significant change.  SW and pt cont to work on getting documentation to [facility] office and waiting for follow up from them.    Medication regimen as follows, no changes as of 2/13/2025:  Abilify 5 mg daily as mood adjunct   Zoloft 150 mg daily for depression and anxiety  Continue to encourage participation in group therapy, milieu therapy and occupational therapy.  Continue to assess for side effects of medications.  Continue collaboration with PATRICIA for medical co-morbidities as indicated.  Continue discussion with CM/SW to assist with obtaining collateral, disposition planning, and the implementation of patient-centered individualized plan of care.  Continue frequent safety checks and vitals per unit protocol.    Risks, benefits and possible side effects of Medications: Risks, benefits, and possible side effects of medications have previously been explained. No new medications at this time.      Legal status: 201    Disposition: to be determined, pending SOAR application for potential group home placement. OT Cognitive Evaluation completed: \"Pt would benefit from discharge to a supportive environment that can provide checks for safety and compliance with IADLs. \"   Although patient's mood has stabilized, they are currently awaiting group home placement.  Their ability to recognize safety hazards take medications and participate in IADLs are significantly impaired by their cognitive deficits compounded by their chronic mental health needs " and would be at risk for significant decompensation without the structure and support of a group home setting.      No associated orders from this encounter found during lookback period of 72 hours.  Autism spectrum disorder  Continue supportive care    No associated orders from this encounter found during lookback period of 72 hours.    Hyperlipidemia  - Lipitor 40 mg       Plan     Recommended Treatment:    - Encourage early mobility and having a structured day  - Provide frequent re-orientation, and cognitive stimulation  - Ensure assistive devices are in proper working order (eye-glasses, hearing aids)  - Encourage adequate hydration, nutrition and monitor bowel movements  - Maintain sleep-wake cycle: Uninterrupted sleep time; low-level lighting at night  - Fall precaution  - f/u [facility] recs regarding the medical problems   - Continue medication titration and treatment plan; adjust medication to optimize treatment response and as clinically indicated. .   - Observation: routine            - VS: as per unit protocol  - Diet: Regular diet  - Encourage group attendance and milieu therapy  - Dispo: To be determined     Scheduled medications:  Current Facility-Administered Medications   Medication Dose Route Frequency Provider Last Rate    acetaminophen  650 mg Oral Q6H PRN Basilio Brown MD      acetaminophen  650 mg Oral Q4H PRN Basilio Brown MD      acetaminophen  975 mg Oral Q6H PRN Basilio Brown MD      aluminum-magnesium hydroxide-simethicone  30 mL Oral Q4H PRN Basilio Brown MD      ARIPiprazole  5 mg Oral Daily Basilio Panda MD      Artificial Tears  1 drop Both Eyes Q3H PRN Basilio Brown MD      atorvastatin  40 mg Oral Daily With Dinner WALLY Baptiste      benztropine  1 mg Intramuscular Q4H PRN Max 6/day Basilio Brown MD      benztropine  1 mg Oral Q4H PRN Max 6/day Basilio Brown MD      Cholecalciferol  2,500 Units Oral Daily Dustin Mcallister MD       cyanocobalamin  1,000 mcg Oral Daily WALLY Baptiste      Diclofenac Sodium  2 g Topical 4x Daily PRN WALLY Baptiste      hydrOXYzine HCL  50 mg Oral Q6H PRN Max 4/day Basilio Brown MD      Or    diphenhydrAMINE  50 mg Intramuscular Q6H PRN Basilio Brown MD      diphenhydrAMINE-zinc acetate   Topical Daily PRN Raj Guerrero MD      famotidine  20 mg Oral BID LauraWALLY Tony      hydrOXYzine HCL  100 mg Oral Q6H PRN Max 4/day Basilio Brown MD      Or    LORazepam  2 mg Intramuscular Q6H PRN Basilio Brown MD      hydrOXYzine HCL  25 mg Oral Q6H PRN Max 4/day Basilio Brown MD      levothyroxine  37.5 mcg Oral Early Morning WALLY Baptiste      melatonin  6 mg Oral HS PRN WALLY Gomez      methocarbamol  500 mg Oral Q6H PRN WALLY Baptiste      OLANZapine  5 mg Oral Q4H PRN Max 3/day Basilio Brown MD      Or    OLANZapine  2.5 mg Intramuscular Q4H PRN Max 3/day Basilio Brown MD      OLANZapine  5 mg Oral Q3H PRN Max 3/day Basilio Brown MD      Or    OLANZapine  5 mg Intramuscular Q3H PRN Max 3/day Basilio Brown MD      OLANZapine  2.5 mg Oral Q4H PRN Max 6/day Basilio Brown MD      polyethylene glycol  17 g Oral Daily PRN Basilio Brown MD      propranolol  10 mg Oral Q8H PRN Basilio Brown MD      senna-docusate sodium  2 tablet Oral After Dinner Dustin Mcallister MD      sertraline  150 mg Oral Daily WALLY Gomez      traZODone  50 mg Oral HS PRN Basilio Brown MD        PRN:    acetaminophen    acetaminophen    acetaminophen    aluminum-magnesium hydroxide-simethicone    Artificial Tears    benztropine    benztropine    Diclofenac Sodium    hydrOXYzine HCL **OR** diphenhydrAMINE    diphenhydrAMINE-zinc acetate    hydrOXYzine HCL **OR** LORazepam    hydrOXYzine HCL    melatonin    methocarbamol    OLANZapine **OR** OLANZapine    OLANZapine **OR**  "OLANZapine    OLANZapine    polyethylene glycol    propranolol    traZODone       Subjective     Patient was visited on unit for continuing care; chart reviewed and discussed with multidisciplinary treatment team.  On approach, the patient was calm and cooperative. Denied any changes in mood, appetite, and energy level. No problem initiating and maintaining sleep.  Denied A/VH currently.  Denied SI/HI, intent or plan upon direct inquiry at this time. Was able to work w/ SW to get access to bank account and continue transfer of funds to [employer] account. Still waiting to hear from social sec office for updates.    Patient continues to be visible in the milieu and interacts with staff and peers. No reports of aggression or self-injurious behavior on unit. No PRN medications used in the past 24 hours.    Patient accepted all offered medications and no adverse effects of medications noted or reported.    Objective    Current Mental Status Evaluation:  Mental Status Exam  Appearance: casually dressed, consistent with stated age  Motor: no psychomotor retardation, no gait abnormalities  Behavior: cooperative, answers questions appropriately  Speech: soft, normal rhythm  Mood: \"ok\"  Affect: blunted  Thought Process: linear and goal-oriented  Thought Content: denies delusions  Risk Potential: denies suicidal ideation, plan, or intent. Denies homicidal ideation  Perceptions: denies auditory hallucinations, denies visual hallucinations,   Sensorium: Oriented to person, place, time, and situation  Cognition: cognitive ability appears intact but was not quantitatively tested  Consciousness: alert and awake  Attention: intact, able to focus without difficulty  Insight: limited  Judgement: fair            Vital signs in last 24 hours:    Temp:  [96.6 °F (35.9 °C)-97 °F (36.1 °C)] 97 °F (36.1 °C)  HR:  [64-70] 64  BP: (115-137)/(62-84) 115/62  Resp:  [16] 16  SpO2:  [97 %] 97 %  O2 Device: None (Room air)    Psychiatric Review of " Systems:  Medication adverse effects: none  Sleep: unchanged  Appetite: unchanged  Behaviors over the past 24 hours: unchanged    Laboratory results:    I have personally reviewed all pertinent laboratory/tests results  No results found for this or any previous visit (from the past 48 hours).       Progress Toward Goals & Illness Status:   progressing, attends groups, participates in milieu therapy, placement pending    Patient is not at goal. They are not yet ready for discharge. The patient's condition currently requires active psychopharmacological medication management, interdisciplinary coordination with case management, and the utilization of adjunctive milieu and group therapy to augment psychopharmacological efficacy. The patient's risk of morbidity, and progression or decompensation of psychiatric disease, is higher without this current treatment.     Next of Kin  Extended Emergency Contact Information  Primary Emergency Contact: Lois Roberson  Address: [address] [place] [address]  Home Phone: [phone]  Relation: Mother    Counseling / Coordination of Care  Patient's progress discussed with staff in treatment team meeting.  Medications, treatment progress and treatment plan reviewed with patient.  Medication education provided to patient.  Educated on importance of medication and treatment compliance.  Supportive therapy provided to patient.  Cognitive techniques utilized during the session.  Reassurance and supportive therapy provided.  Reoriented to reality and reassured.  Encouraged participation in milieu and group therapy on the unit.  Crisis/safety plan discussed with patient.       Dustin Mcallister MD  Attending Psychiatrist   [facility]      This note has been constructed using a voice recognition system. There may be translation, syntax, or grammatical errors. If you have any questions, please contact the  dictating provider.

## 2025-02-13 NOTE — ASSESSMENT & PLAN NOTE
"Patient continues to do well and the plan is to continue with the current treatment plan as mentioned below with no changes.    Patient continues to be pending group home placement. Otherwise no clinical significant change.  SW and pt cont to work on getting documentation to [facility] office and waiting for follow up from them.    Medication regimen as follows, no changes as of 2/13/2025:  Abilify 5 mg daily as mood adjunct   Zoloft 150 mg daily for depression and anxiety  Continue to encourage participation in group therapy, milieu therapy and occupational therapy.  Continue to assess for side effects of medications.  Continue collaboration with [facility] for medical co-morbidities as indicated.  Continue discussion with CM/SW to assist with obtaining collateral, disposition planning, and the implementation of patient-centered individualized plan of care.  Continue frequent safety checks and vitals per unit protocol.    Risks, benefits and possible side effects of Medications: Risks, benefits, and possible side effects of medications have previously been explained. No new medications at this time.      Legal status: 201    Disposition: to be determined, pending SOAR application for potential group home placement. OT Cognitive Evaluation completed: \"Pt would benefit from discharge to a supportive environment that can provide checks for safety and compliance with IADLs. \"   Although patient's mood has stabilized, they are currently awaiting group home placement.  Their ability to recognize safety hazards take medications and participate in IADLs are significantly impaired by their cognitive deficits compounded by their chronic mental health needs and would be at risk for significant decompensation without the structure and support of a group home setting.      No associated orders from this encounter found during lookback period of 72 hours.  "

## 2025-02-13 NOTE — NURSING NOTE
Patient secluded to room this shift,appears to be withdrawn and depressed.Patient denies depression SI/HI/AH/VH at this time. Compliant with medications and routine vitals. Patient encouraged to partake in group activities with no success.

## 2025-02-14 PROCEDURE — 99232 SBSQ HOSP IP/OBS MODERATE 35: CPT | Performed by: PSYCHIATRY & NEUROLOGY

## 2025-02-14 RX ADMIN — ARIPIPRAZOLE 5 MG: 5 TABLET ORAL at 08:15

## 2025-02-14 RX ADMIN — Medication 2500 UNITS: at 08:16

## 2025-02-14 RX ADMIN — FAMOTIDINE 20 MG: 20 TABLET ORAL at 17:13

## 2025-02-14 RX ADMIN — ATORVASTATIN CALCIUM 40 MG: 40 TABLET, FILM COATED ORAL at 17:13

## 2025-02-14 RX ADMIN — SERTRALINE HYDROCHLORIDE 150 MG: 100 TABLET ORAL at 08:15

## 2025-02-14 RX ADMIN — LEVOTHYROXINE SODIUM 37.5 MCG: 0.07 TABLET ORAL at 06:21

## 2025-02-14 RX ADMIN — FAMOTIDINE 20 MG: 20 TABLET ORAL at 08:16

## 2025-02-14 RX ADMIN — SENNOSIDES AND DOCUSATE SODIUM 2 TABLET: 50; 8.6 TABLET ORAL at 17:13

## 2025-02-14 RX ADMIN — CYANOCOBALAMIN TAB 1000 MCG 1000 MCG: 1000 TAB at 08:16

## 2025-02-14 NOTE — PLAN OF CARE
Problem: Ineffective Coping  Goal: Participates in unit activities  Description: Interventions:  - Provide therapeutic environment   - Provide required programming   - Redirect inappropriate behaviors   Outcome: Progressing     Problem: Depression  Goal: Treatment Goal: Demonstrate behavioral control of depressive symptoms, verbalize feelings of improved mood/affect, and adopt new coping skills prior to discharge  Outcome: Progressing  Goal: Verbalize thoughts and feelings  Description: Interventions:  - Assess and re-assess patient's level of risk   - Engage patient in 1:1 interactions, daily, for a minimum of 15 minutes   - Encourage patient to express feelings, fears, frustrations, hopes   Outcome: Progressing  Goal: Refrain from harming self  Description: Interventions:  - Monitor patient closely, per order   - Supervise medication ingestion, monitor effects and side effects   Outcome: Progressing  Goal: Refrain from isolation  Description: Interventions:  - Develop a trusting relationship   - Encourage socialization   Outcome: Progressing     Problem: Anxiety  Goal: Anxiety is at manageable level  Description: Interventions:  - Assess and monitor patient's anxiety level.   - Monitor for signs and symptoms (heart palpitations, chest pain, shortness of breath, headaches, nausea, feeling jumpy, restlessness, irritable, apprehensive).   - Collaborate with interdisciplinary team and initiate plan and interventions as ordered.  - [place] patient to unit/surroundings  - Explain treatment plan  - Encourage participation in care  - Encourage verbalization of concerns/fears  - Identify coping mechanisms  - Assist in developing anxiety-reducing skills  - Administer/offer alternative therapies  - Limit or eliminate stimulants  Outcome: Progressing     Problem: DISCHARGE PLANNING - CARE MANAGEMENT  Goal: Discharge to post-acute care or home with appropriate resources  Description: INTERVENTIONS:  - Conduct assessment to  determine patient/family and health care team treatment goals, and need for post-acute services based on payer coverage, community resources, and patient preferences, and barriers to discharge  - Address psychosocial, clinical, and financial barriers to discharge as identified in assessment in conjunction with the patient/family and health care team  - Arrange appropriate level of post-acute services according to patient’s   needs and preference and payer coverage in collaboration with the physician and health care team  - Communicate with and update the patient/family, physician, and health care team regarding progress on the discharge plan  - Arrange appropriate transportation to post-acute venues  Outcome: Progressing     Problem: Knowledge Deficit  Goal: Patient/family/caregiver demonstrates understanding of disease process, treatment plan, medications, and discharge instructions  Description: Complete learning assessment and assess knowledge base.  Interventions:  - Provide teaching at level of understanding  - Provide teaching via preferred learning methods  Outcome: Progressing     Problem: SLEEP DISTURBANCE  Goal: Will exhibit normal sleeping pattern  Description: Interventions:  -  Assess the patients sleep pattern, noting recent changes  - Administer medication as ordered  - Decrease environmental stimuli, including noise, as appropriate during the night  - Encourage the patient to actively participate in unit groups and or exercise during the day to enhance ability to achieve adequate sleep at night  - Assess the patient, in the morning, encouraging a description of sleep experience  Outcome: Progressing

## 2025-02-14 NOTE — NURSING NOTE
"Patient has been visible intermittently. Denies symptoms. Compliant with medications. Reports talking to his friend on the phone the last several nights because his friend is \"going through a crisis\". Did not elaborate. Waiting placement.   "

## 2025-02-14 NOTE — SOCIAL WORK
"Pt approached SW to advise he had \"undisputed the charges\" from his friend fraudulently using his account. Pt states he is now concerned because \"people have been going to my friends house\" and states, \"I don't want him to get arrested for a simple misunderstanding\". Pt asked if transfer to [employer] account can be canceled. SW advised transfer cannot be stopped at this time.   "

## 2025-02-14 NOTE — NURSING NOTE
Pt withdrawn most of shift, came out for meals and to use the phone. Pleasant and cooperative in conversation. Denies SI/HI/AH/VH. Medication and meal compliant. No current unmet needs.

## 2025-02-14 NOTE — PROGRESS NOTES
"Progress Note - Behavioral Health   Name: Franco Roberson 39 y.o. male I MRN: [id number]  Unit/Bed#: -01 I Date of Admission: 5/14/2024   Date of Service: 2/14/2025 I Hospital Day: 276    Assessment & Plan  MDD (major depressive disorder), recurrent severe, without psychosis (HCC)  Patient continues to do well and the plan is to continue with the current treatment plan as mentioned below with no changes.    Patient continues to be pending group home placement. Otherwise no clinical significant change.  SW and pt cont to work on getting documentation to [facility] office and waiting for follow up from them.    Medication regimen as follows, no changes as of 2/14/2025:  Abilify 5 mg daily as mood adjunct   Zoloft 150 mg daily for depression and anxiety  Continue to encourage participation in group therapy, milieu therapy and occupational therapy.  Continue to assess for side effects of medications.  Continue collaboration with PATRICIA for medical co-morbidities as indicated.  Continue discussion with CM/SW to assist with obtaining collateral, disposition planning, and the implementation of patient-centered individualized plan of care.  Continue frequent safety checks and vitals per unit protocol.    Risks, benefits and possible side effects of Medications: Risks, benefits, and possible side effects of medications have previously been explained. No new medications at this time.      Legal status: 201    Disposition: to be determined, pending SOAR application for potential group home placement. OT Cognitive Evaluation completed: \"Pt would benefit from discharge to a supportive environment that can provide checks for safety and compliance with IADLs. \"   Although patient's mood has stabilized, they are currently awaiting group home placement.  Their ability to recognize safety hazards take medications and participate in IADLs are significantly impaired by their cognitive deficits compounded by their chronic mental health needs " and would be at risk for significant decompensation without the structure and support of a group home setting.      No associated orders from this encounter found during lookback period of 72 hours.  Autism spectrum disorder  Continue supportive care    No associated orders from this encounter found during lookback period of 72 hours.    Hyperlipidemia  - Lipitor 40 mg     Current medications:  Current Facility-Administered Medications   Medication Dose Route Frequency Provider Last Rate    acetaminophen  650 mg Oral Q6H PRN Basilio Brown MD      acetaminophen  650 mg Oral Q4H PRN Basilio Brown MD      acetaminophen  975 mg Oral Q6H PRN Basilio Brown MD      aluminum-magnesium hydroxide-simethicone  30 mL Oral Q4H PRN Basilio Brown MD      ARIPiprazole  5 mg Oral Daily Basilio Panda MD      Artificial Tears  1 drop Both Eyes Q3H PRN Basilio Brown MD      atorvastatin  40 mg Oral Daily With Dinner WALLY Baptiste      benztropine  1 mg Intramuscular Q4H PRN Max 6/day Basilio Brown MD      benztropine  1 mg Oral Q4H PRN Max 6/day Basilio Brown MD      Cholecalciferol  2,500 Units Oral Daily Dustin Mcallister MD      cyanocobalamin  1,000 mcg Oral Daily WALLY Baptiste      Diclofenac Sodium  2 g Topical 4x Daily PRN WALLY Baptiste      hydrOXYzine HCL  50 mg Oral Q6H PRN Max 4/day Basilio Brown MD      Or    diphenhydrAMINE  50 mg Intramuscular Q6H PRN Basilio Brown MD      diphenhydrAMINE-zinc acetate   Topical Daily PRN Raj Guerrero MD      famotidine  20 mg Oral BID WALLY Baptiste      hydrOXYzine HCL  100 mg Oral Q6H PRN Max 4/day Basilio Brown MD      Or    LORazepam  2 mg Intramuscular Q6H PRN Basilio Brown MD      hydrOXYzine HCL  25 mg Oral Q6H PRN Max 4/day Basilio Brown MD      levothyroxine  37.5 mcg Oral Early Morning WALLY Baptiste      melatonin  6 mg Oral HS  PRN WALLY Gomez      methocarbamol  500 mg Oral Q6H PRN WALLY Baptiste      OLANZapine  5 mg Oral Q4H PRN Max 3/day Basilio Brown MD      Or    OLANZapine  2.5 mg Intramuscular Q4H PRN Max 3/day Basilio Brown MD      OLANZapine  5 mg Oral Q3H PRN Max 3/day Basilio Brown MD      Or    OLANZapine  5 mg Intramuscular Q3H PRN Max 3/day Basilio Brown MD      OLANZapine  2.5 mg Oral Q4H PRN Max 6/day Basilio Brown MD      polyethylene glycol  17 g Oral Daily PRN Basilio Brown MD      propranolol  10 mg Oral Q8H PRN Basilio Brown MD      senna-docusate sodium  2 tablet Oral After Dinner Dustin Mcallister MD      sertraline  150 mg Oral Daily WALLY Gomez      traZODone  50 mg Oral HS PRN Basilio Brown MD          Risks/Benefits of Treatment:     Risks, benefits, and possible side effects of medications explained to patient and patient verbalizes understanding and agreement for treatment.    Progress Toward Goals:  patient seen in [facility], he reports     Treatment Planning:     All current active medications have been reviewed.  Continue to monitor response to treatment and assess for potential side effects of medications.  Encourage group therapy, milieu therapy and occupational therapy  Collaboration with medical service for medical comorbidities as indicated.  Behavioral Health checks for safety monitoring.  Long Stay Certification: Requires continued inpatient treatment while awaiting placement into structured residence placement due to chronic psychiatric illness (mood symptoms), ongoing suicidal thoughts, high risk of psychiatric decompensation, readmission and becoming a risk of danger to self/others if discharged to an unstructured environment without adequate support.  Estimated Discharge Day: 2/28/2025   Legal Status : Voluntary 201 commitment.    Subjective  Per RN report he has been coperative, recently expressed more stress  "over dealing with friend and friend who may be taking money from account. Patient reports he is growing out his beard until he leaves. Still writing poetry and after being out for breakfast he went right back to bed.     Behavior over the last 24 hours: unchanged.    Sleep: napping in the day  Appetite: normal  Medication side effects: No  ROS: review of systems as noted above in HPI/Subjective report, all other systems are negative    Objective :  Temp:  [97.3 °F (36.3 °C)-97.5 °F (36.4 °C)] 97.5 °F (36.4 °C)  HR:  [65-82] 65  BP: (122-128)/(64-71) 128/64  Resp:  [16-18] 18  SpO2:  [96 %] 96 %  O2 Device: None (Room air)    Temp:  [97.3 °F (36.3 °C)-97.5 °F (36.4 °C)] 97.5 °F (36.4 °C)  HR:  [65-82] 65  BP: (122-128)/(64-71) 128/64  Resp:  [16-18] 18  SpO2:  [96 %] 96 %  O2 Device: None (Room air)    Mental Status Evaluation:    Appearance:  disheveled   Behavior:  Coperative, fair eye contact   Speech:  fluent   Mood:  \"Ok\"   Affect:  constricted   Thought Process:  concrete   Thought Content:  no overt delusions   Perceptual Disturbances: no auditory hallucinations, no visual hallucinations   Risk Potential: Suicidal Ideation -  None at present  Homicidal Ideation -  None at present  Potential for Aggression - Not at present   Sensorium:  oriented to person, place, and time/date   Memory:  recent and remote memory grossly intact   Consciousness:  alert and awake   Attention/Concentration: attention span and concentration appear shorter than expected for age   Insight:  fair   Judgment: fair               Lab Results: I have reviewed the following results:  Results from the past 24 hours: No results found for this or any previous visit (from the past 24 hours).    Administrative Statements     Counseling / Coordination of Care:   Patient's progress discussed with staff in treatment team meeting.  Medication changes reviewed with staff in treatment team meeting..    Geraldine Leonard MD 02/14/25  "

## 2025-02-14 NOTE — PROGRESS NOTES
02/14/25 0841   Team Meeting   Meeting Type Daily Rounds   Team Members Present   Team Members Present Physician;Nurse;   Physician Team Member Ary   Nursing Team Member [facility] Team Member Suhas   Patient/Family Present   Patient Present No   Patient's Family Present No     Pt med/meal compliant. Visible on the unit. Pleasant, calm, cooperative. Talking on the phone with friend. Discharge pending placement.

## 2025-02-14 NOTE — PROGRESS NOTES
"Progress Note - Behavioral Health   Name: Franco Roberson 39 y.o. male I MRN: [id number]  Unit/Bed#: -01 I Date of Admission: 5/14/2024   Date of Service: 2/14/2025 I Hospital Day: 276     Assessment & Plan  MDD (major depressive disorder), recurrent severe, without psychosis (HCC)  Patient continues to do well and the plan is to continue with the current treatment plan as mentioned below with no changes.    Patient continues to be pending group home placement. Otherwise no clinical significant change.  SW and pt cont to work on getting documentation to [facility] office and waiting for follow up from them.    Medication regimen as follows, no changes as of 2/14/2025:  Abilify 5 mg daily as mood adjunct   Zoloft 150 mg daily for depression and anxiety  Continue to encourage participation in group therapy, milieu therapy and occupational therapy.  Continue to assess for side effects of medications.  Continue collaboration with PATRICIA for medical co-morbidities as indicated.  Continue discussion with CM/SW to assist with obtaining collateral, disposition planning, and the implementation of patient-centered individualized plan of care.  Continue frequent safety checks and vitals per unit protocol.    Risks, benefits and possible side effects of Medications: Risks, benefits, and possible side effects of medications have previously been explained. No new medications at this time.      Legal status: 201    Disposition: to be determined, pending SOAR application for potential group home placement. OT Cognitive Evaluation completed: \"Pt would benefit from discharge to a supportive environment that can provide checks for safety and compliance with IADLs. \"   Although patient's mood has stabilized, they are currently awaiting group home placement.  Their ability to recognize safety hazards take medications and participate in IADLs are significantly impaired by their cognitive deficits compounded by their chronic mental health needs " and would be at risk for significant decompensation without the structure and support of a group home setting.      No associated orders from this encounter found during lookback period of 72 hours.  Autism spectrum disorder  Continue supportive care    No associated orders from this encounter found during lookback period of 72 hours.    Hyperlipidemia  - Lipitor 40 mg       Plan     Recommended Treatment:    - Encourage early mobility and having a structured day  - Provide frequent re-orientation, and cognitive stimulation  - Ensure assistive devices are in proper working order (eye-glasses, hearing aids)  - Encourage adequate hydration, nutrition and monitor bowel movements  - Maintain sleep-wake cycle: Uninterrupted sleep time; low-level lighting at night  - Fall precaution  - f/u [facility] recs regarding the medical problems   - Continue medication titration and treatment plan; adjust medication to optimize treatment response and as clinically indicated. .   - Observation: routine            - VS: as per unit protocol  - Diet: Regular diet  - Encourage group attendance and milieu therapy  - Dispo: To be determined     Scheduled medications:  Current Facility-Administered Medications   Medication Dose Route Frequency Provider Last Rate    acetaminophen  650 mg Oral Q6H PRN Basilio Brown MD      acetaminophen  650 mg Oral Q4H PRN Basilio Brown MD      acetaminophen  975 mg Oral Q6H PRN Basilio Brown MD      aluminum-magnesium hydroxide-simethicone  30 mL Oral Q4H PRN Basilio Brown MD      ARIPiprazole  5 mg Oral Daily Basilio Panda MD      Artificial Tears  1 drop Both Eyes Q3H PRN Basilio Brown MD      atorvastatin  40 mg Oral Daily With Dinner WALLY Baptiste      benztropine  1 mg Intramuscular Q4H PRN Max 6/day Basilio Brown MD      benztropine  1 mg Oral Q4H PRN Max 6/day Basilio Brown MD      Cholecalciferol  2,500 Units Oral Daily Dustin Mcallister MD       cyanocobalamin  1,000 mcg Oral Daily WALLY Baptiste      Diclofenac Sodium  2 g Topical 4x Daily PRN WALLY Baptiste      hydrOXYzine HCL  50 mg Oral Q6H PRN Max 4/day Basilio Brown MD      Or    diphenhydrAMINE  50 mg Intramuscular Q6H PRN Basilio Brown MD      diphenhydrAMINE-zinc acetate   Topical Daily PRN Raj Guerrero MD      famotidine  20 mg Oral BID LauraWALLY Tony      hydrOXYzine HCL  100 mg Oral Q6H PRN Max 4/day Basilio Brown MD      Or    LORazepam  2 mg Intramuscular Q6H PRN Basilio Brown MD      hydrOXYzine HCL  25 mg Oral Q6H PRN Max 4/day Basilio Brown MD      levothyroxine  37.5 mcg Oral Early Morning WALLY Baptiste      melatonin  6 mg Oral HS PRN WALLY Gomez      methocarbamol  500 mg Oral Q6H PRN WALLY Baptiste      OLANZapine  5 mg Oral Q4H PRN Max 3/day Basilio Brown MD      Or    OLANZapine  2.5 mg Intramuscular Q4H PRN Max 3/day Basilio Brown MD      OLANZapine  5 mg Oral Q3H PRN Max 3/day Basilio Brown MD      Or    OLANZapine  5 mg Intramuscular Q3H PRN Max 3/day Basilio Brown MD      OLANZapine  2.5 mg Oral Q4H PRN Max 6/day Basilio Brown MD      polyethylene glycol  17 g Oral Daily PRN Basilio Brown MD      propranolol  10 mg Oral Q8H PRN Basilio Brown MD      senna-docusate sodium  2 tablet Oral After Dinner Dustin Mcallister MD      sertraline  150 mg Oral Daily WALLY Gomez      traZODone  50 mg Oral HS PRN Basilio Brown MD        PRN:    acetaminophen    acetaminophen    acetaminophen    aluminum-magnesium hydroxide-simethicone    Artificial Tears    benztropine    benztropine    Diclofenac Sodium    hydrOXYzine HCL **OR** diphenhydrAMINE    diphenhydrAMINE-zinc acetate    hydrOXYzine HCL **OR** LORazepam    hydrOXYzine HCL    melatonin    methocarbamol    OLANZapine **OR** OLANZapine    OLANZapine **OR**  "OLANZapine    OLANZapine    polyethylene glycol    propranolol    traZODone       Subjective     Patient was visited on unit for continuing care; chart reviewed and discussed with multidisciplinary treatment team.  On approach, the patient was calm and cooperative. Denied any changes in mood, appetite, and energy level. No problem initiating and maintaining sleep.  Denied A/VH currently.  Denied SI/HI, intent or plan upon direct inquiry at this time.  Patient want to speak with  today to further discuss access to his bank account and findings to transfer to is able to count.  I informed patient that I will speak with social work and have them stop by today.    Patient continues to be visible in the milieu and interacts with staff and peers. No reports of aggression or self-injurious behavior on unit. No PRN medications used in the past 24 hours.    Patient accepted all offered medications and no adverse effects of medications noted or reported.    Objective    Current Mental Status Evaluation:  Mental Status Exam  Appearance: casually dressed, consistent with stated age  Motor: no psychomotor retardation, no gait abnormalities  Behavior: cooperative, answers questions appropriately  Speech: soft, normal rhythm  Mood: \"good\"  Affect: blunted  Thought Process: concrete  Thought Content: denies delusions  Risk Potential: denies suicidal ideation, plan, or intent. Denies homicidal ideation  Perceptions: denies auditory hallucinations, denies visual hallucinations,   Sensorium: Oriented to person, place, time, and situation  Cognition: cognitive ability appears intact but was not quantitatively tested  Consciousness: alert and awake  Attention: intact, able to focus without difficulty  Insight: limited  Judgement: fair            Vital signs in last 24 hours:    Temp:  [97.3 °F (36.3 °C)-97.5 °F (36.4 °C)] 97.5 °F (36.4 °C)  HR:  [65-82] 65  BP: (122-128)/(64-71) 128/64  Resp:  [16-18] 18  SpO2:  [96 %] 96 " %  O2 Device: None (Room air)    Psychiatric Review of Systems:  Medication adverse effects: none  Sleep: unchanged  Appetite: unchanged  Behaviors over the past 24 hours: unchanged    Laboratory results:    I have personally reviewed all pertinent laboratory/tests results  No results found for this or any previous visit (from the past 48 hours).       Progress Toward Goals & Illness Status:   progressing, attends groups, participates in milieu therapy, mood is stabilizing, placement pending    Patient is not at goal. They are not yet ready for discharge. The patient's condition currently requires active psychopharmacological medication management, interdisciplinary coordination with case management, and the utilization of adjunctive milieu and group therapy to augment psychopharmacological efficacy. The patient's risk of morbidity, and progression or decompensation of psychiatric disease, is higher without this current treatment.     Next of Kin  Extended Emergency Contact Information  Primary Emergency Contact: Lois Roberson  Address: [address] [place] [address] ANIKA           [place] [id number] [place] [address]  Home Phone: [phone]  Relation: Mother    Counseling / Coordination of Care  Patient's progress discussed with staff in treatment team meeting.  Medications, treatment progress and treatment plan reviewed with patient.  Medication education provided to patient.  Educated on importance of medication and treatment compliance.  Supportive therapy provided to patient.  Cognitive techniques utilized during the session.  Reassurance and supportive therapy provided.  Reoriented to reality and reassured.  Encouraged participation in milieu and group therapy on the unit.  Crisis/safety plan discussed with patient.       Dustin Mcallister MD  Attending Psychiatrist   [facility]      This note has been constructed using a voice recognition system. There may be translation, syntax,  or grammatical errors. If you have any questions, please contact the dictating provider.

## 2025-02-14 NOTE — ASSESSMENT & PLAN NOTE
"Patient continues to do well and the plan is to continue with the current treatment plan as mentioned below with no changes.    Patient continues to be pending group home placement. Otherwise no clinical significant change.  SW and pt cont to work on getting documentation to [facility] office and waiting for follow up from them.    Medication regimen as follows, no changes as of 2/14/2025:  Abilify 5 mg daily as mood adjunct   Zoloft 150 mg daily for depression and anxiety  Continue to encourage participation in group therapy, milieu therapy and occupational therapy.  Continue to assess for side effects of medications.  Continue collaboration with [facility] for medical co-morbidities as indicated.  Continue discussion with CM/SW to assist with obtaining collateral, disposition planning, and the implementation of patient-centered individualized plan of care.  Continue frequent safety checks and vitals per unit protocol.    Risks, benefits and possible side effects of Medications: Risks, benefits, and possible side effects of medications have previously been explained. No new medications at this time.      Legal status: 201    Disposition: to be determined, pending SOAR application for potential group home placement. OT Cognitive Evaluation completed: \"Pt would benefit from discharge to a supportive environment that can provide checks for safety and compliance with IADLs. \"   Although patient's mood has stabilized, they are currently awaiting group home placement.  Their ability to recognize safety hazards take medications and participate in IADLs are significantly impaired by their cognitive deficits compounded by their chronic mental health needs and would be at risk for significant decompensation without the structure and support of a group home setting.      No associated orders from this encounter found during lookback period of 72 hours.  "

## 2025-02-15 PROCEDURE — 99232 SBSQ HOSP IP/OBS MODERATE 35: CPT | Performed by: PSYCHIATRY & NEUROLOGY

## 2025-02-15 RX ADMIN — FAMOTIDINE 20 MG: 20 TABLET ORAL at 17:20

## 2025-02-15 RX ADMIN — Medication 2500 UNITS: at 08:58

## 2025-02-15 RX ADMIN — ARIPIPRAZOLE 5 MG: 5 TABLET ORAL at 08:58

## 2025-02-15 RX ADMIN — LEVOTHYROXINE SODIUM 37.5 MCG: 0.07 TABLET ORAL at 06:30

## 2025-02-15 RX ADMIN — CYANOCOBALAMIN TAB 1000 MCG 1000 MCG: 1000 TAB at 08:58

## 2025-02-15 RX ADMIN — FAMOTIDINE 20 MG: 20 TABLET ORAL at 08:58

## 2025-02-15 RX ADMIN — SENNOSIDES AND DOCUSATE SODIUM 2 TABLET: 50; 8.6 TABLET ORAL at 17:19

## 2025-02-15 RX ADMIN — ATORVASTATIN CALCIUM 40 MG: 40 TABLET, FILM COATED ORAL at 17:20

## 2025-02-15 RX ADMIN — SERTRALINE HYDROCHLORIDE 150 MG: 100 TABLET ORAL at 08:58

## 2025-02-15 NOTE — NURSING NOTE
Patient visible for short periods of time during snack and to retrieve meal tray. Patient appears to be withdrawn to self. Compliant with medications and routine vitals. Encouraged to attend group with no success. Denies SI/HI/AH/VH at this time.

## 2025-02-15 NOTE — NURSING NOTE
Pt is calm, cooperative, isolating to self in room. Pt denies SI, HI, AH, VH and pain at this time. Pt is seen in milieu for medications and meals. Pt eats meals in room but gets tray and brings it back. Pt attends partial groups, does ADLs and is meal and medication complaint. Pt denies any unmet needs and remains on q15 min checks for safety.

## 2025-02-15 NOTE — PLAN OF CARE
Problem: Ineffective Coping  Goal: Participates in unit activities  Description: Interventions:  - Provide therapeutic environment   - Provide required programming   - Redirect inappropriate behaviors   Outcome: Progressing     Problem: Depression  Goal: Treatment Goal: Demonstrate behavioral control of depressive symptoms, verbalize feelings of improved mood/affect, and adopt new coping skills prior to discharge  Outcome: Progressing  Goal: Verbalize thoughts and feelings  Description: Interventions:  - Assess and re-assess patient's level of risk   - Engage patient in 1:1 interactions, daily, for a minimum of 15 minutes   - Encourage patient to express feelings, fears, frustrations, hopes   Outcome: Progressing  Goal: Refrain from harming self  Description: Interventions:  - Monitor patient closely, per order   - Supervise medication ingestion, monitor effects and side effects   Outcome: Progressing  Goal: Refrain from isolation  Description: Interventions:  - Develop a trusting relationship   - Encourage socialization   Outcome: Progressing     Problem: Anxiety  Goal: Anxiety is at manageable level  Description: Interventions:  - Assess and monitor patient's anxiety level.   - Monitor for signs and symptoms (heart palpitations, chest pain, shortness of breath, headaches, nausea, feeling jumpy, restlessness, irritable, apprehensive).   - Collaborate with interdisciplinary team and initiate plan and interventions as ordered.  - [place] patient to unit/surroundings  - Explain treatment plan  - Encourage participation in care  - Encourage verbalization of concerns/fears  - Identify coping mechanisms  - Assist in developing anxiety-reducing skills  - Administer/offer alternative therapies  - Limit or eliminate stimulants  Outcome: Progressing     Problem: DISCHARGE PLANNING - CARE MANAGEMENT  Goal: Discharge to post-acute care or home with appropriate resources  Description: INTERVENTIONS:  - Conduct assessment to  determine patient/family and health care team treatment goals, and need for post-acute services based on payer coverage, community resources, and patient preferences, and barriers to discharge  - Address psychosocial, clinical, and financial barriers to discharge as identified in assessment in conjunction with the patient/family and health care team  - Arrange appropriate level of post-acute services according to patient’s   needs and preference and payer coverage in collaboration with the physician and health care team  - Communicate with and update the patient/family, physician, and health care team regarding progress on the discharge plan  - Arrange appropriate transportation to post-acute venues  Outcome: Progressing     Problem: Knowledge Deficit  Goal: Patient/family/caregiver demonstrates understanding of disease process, treatment plan, medications, and discharge instructions  Description: Complete learning assessment and assess knowledge base.  Interventions:  - Provide teaching at level of understanding  - Provide teaching via preferred learning methods  Outcome: Progressing     Problem: SLEEP DISTURBANCE  Goal: Will exhibit normal sleeping pattern  Description: Interventions:  -  Assess the patients sleep pattern, noting recent changes  - Administer medication as ordered  - Decrease environmental stimuli, including noise, as appropriate during the night  - Encourage the patient to actively participate in unit groups and or exercise during the day to enhance ability to achieve adequate sleep at night  - Assess the patient, in the morning, encouraging a description of sleep experience  Outcome: Progressing

## 2025-02-16 PROCEDURE — 99232 SBSQ HOSP IP/OBS MODERATE 35: CPT | Performed by: PSYCHIATRY & NEUROLOGY

## 2025-02-16 RX ADMIN — CYANOCOBALAMIN TAB 1000 MCG 1000 MCG: 1000 TAB at 08:49

## 2025-02-16 RX ADMIN — Medication 2500 UNITS: at 08:49

## 2025-02-16 RX ADMIN — SERTRALINE HYDROCHLORIDE 150 MG: 100 TABLET ORAL at 08:49

## 2025-02-16 RX ADMIN — FAMOTIDINE 20 MG: 20 TABLET ORAL at 08:49

## 2025-02-16 RX ADMIN — ATORVASTATIN CALCIUM 40 MG: 40 TABLET, FILM COATED ORAL at 17:09

## 2025-02-16 RX ADMIN — FAMOTIDINE 20 MG: 20 TABLET ORAL at 17:09

## 2025-02-16 RX ADMIN — SENNOSIDES AND DOCUSATE SODIUM 2 TABLET: 50; 8.6 TABLET ORAL at 17:09

## 2025-02-16 RX ADMIN — ARIPIPRAZOLE 5 MG: 5 TABLET ORAL at 08:49

## 2025-02-16 RX ADMIN — LEVOTHYROXINE SODIUM 37.5 MCG: 0.07 TABLET ORAL at 06:46

## 2025-02-16 NOTE — PLAN OF CARE
Problem: Ineffective Coping  Goal: Participates in unit activities  Description: Interventions:  - Provide therapeutic environment   - Provide required programming   - Redirect inappropriate behaviors   Outcome: Progressing     Problem: Depression  Goal: Treatment Goal: Demonstrate behavioral control of depressive symptoms, verbalize feelings of improved mood/affect, and adopt new coping skills prior to discharge  Outcome: Progressing  Goal: Verbalize thoughts and feelings  Description: Interventions:  - Assess and re-assess patient's level of risk   - Engage patient in 1:1 interactions, daily, for a minimum of 15 minutes   - Encourage patient to express feelings, fears, frustrations, hopes   Outcome: Progressing  Goal: Refrain from harming self  Description: Interventions:  - Monitor patient closely, per order   - Supervise medication ingestion, monitor effects and side effects   Outcome: Progressing  Goal: Refrain from isolation  Description: Interventions:  - Develop a trusting relationship   - Encourage socialization   Outcome: Progressing     Problem: Anxiety  Goal: Anxiety is at manageable level  Description: Interventions:  - Assess and monitor patient's anxiety level.   - Monitor for signs and symptoms (heart palpitations, chest pain, shortness of breath, headaches, nausea, feeling jumpy, restlessness, irritable, apprehensive).   - Collaborate with interdisciplinary team and initiate plan and interventions as ordered.  - [place] patient to unit/surroundings  - Explain treatment plan  - Encourage participation in care  - Encourage verbalization of concerns/fears  - Identify coping mechanisms  - Assist in developing anxiety-reducing skills  - Administer/offer alternative therapies  - Limit or eliminate stimulants  Outcome: Progressing     Problem: DISCHARGE PLANNING - CARE MANAGEMENT  Goal: Discharge to post-acute care or home with appropriate resources  Description: INTERVENTIONS:  - Conduct assessment to  determine patient/family and health care team treatment goals, and need for post-acute services based on payer coverage, community resources, and patient preferences, and barriers to discharge  - Address psychosocial, clinical, and financial barriers to discharge as identified in assessment in conjunction with the patient/family and health care team  - Arrange appropriate level of post-acute services according to patient’s   needs and preference and payer coverage in collaboration with the physician and health care team  - Communicate with and update the patient/family, physician, and health care team regarding progress on the discharge plan  - Arrange appropriate transportation to post-acute venues  Outcome: Not Progressing     Problem: Knowledge Deficit  Goal: Patient/family/caregiver demonstrates understanding of disease process, treatment plan, medications, and discharge instructions  Description: Complete learning assessment and assess knowledge base.  Interventions:  - Provide teaching at level of understanding  - Provide teaching via preferred learning methods  Outcome: Progressing     Problem: SLEEP DISTURBANCE  Goal: Will exhibit normal sleeping pattern  Description: Interventions:  -  Assess the patients sleep pattern, noting recent changes  - Administer medication as ordered  - Decrease environmental stimuli, including noise, as appropriate during the night  - Encourage the patient to actively participate in unit groups and or exercise during the day to enhance ability to achieve adequate sleep at night  - Assess the patient, in the morning, encouraging a description of sleep experience  Outcome: Progressing

## 2025-02-16 NOTE — PROGRESS NOTES
"Progress Note - Behavioral Health   Name: Franco Roberson 39 y.o. male I MRN: [id number]  Unit/Bed#: -01 I Date of Admission: 5/14/2024   Date of Service: 2/16/2025 I Hospital Day: 278     Assessment & Plan  MDD (major depressive disorder), recurrent severe, without psychosis (HCC)  Patient continues to do well and the plan is to continue with the current treatment plan as mentioned below with no changes.    Patient continues to be pending group home placement. Otherwise no clinical significant change.  SW and pt cont to work on getting documentation to [facility] office and waiting for follow up from them.    Medication regimen as follows, no changes as of 2/14/2025:  Abilify 5 mg daily as mood adjunct   Zoloft 150 mg daily for depression and anxiety  Continue to encourage participation in group therapy, milieu therapy and occupational therapy.  Continue to assess for side effects of medications.  Continue collaboration with [facility] for medical co-morbidities as indicated.  Continue discussion with CM/SW to assist with obtaining collateral, disposition planning, and the implementation of patient-centered individualized plan of care.  Continue frequent safety checks and vitals per unit protocol.    Risks, benefits and possible side effects of Medications: Risks, benefits, and possible side effects of medications have previously been explained. No new medications at this time.      Legal status: 201    Disposition: to be determined, pending SOAR application for potential group home placement. OT Cognitive Evaluation completed: \"Pt would benefit from discharge to a supportive environment that can provide checks for safety and compliance with IADLs. \"   Although patient's mood has stabilized, they are currently awaiting group home placement.  Their ability to recognize safety hazards take medications and participate in IADLs are significantly impaired by their cognitive deficits compounded by their chronic mental health needs " and would be at risk for significant decompensation without the structure and support of a group home setting.      No associated orders from this encounter found during lookback period of 72 hours.  Autism spectrum disorder  Continue supportive care    No associated orders from this encounter found during lookback period of 72 hours.    Hyperlipidemia  - Lipitor 40 mg     Current medications:  Current Facility-Administered Medications   Medication Dose Route Frequency Provider Last Rate    acetaminophen  650 mg Oral Q6H PRN Basilio Brown MD      acetaminophen  650 mg Oral Q4H PRN Basilio Brown MD      acetaminophen  975 mg Oral Q6H PRN Basilio Brown MD      aluminum-magnesium hydroxide-simethicone  30 mL Oral Q4H PRN Basilio Brown MD      ARIPiprazole  5 mg Oral Daily Basilio Panda MD      Artificial Tears  1 drop Both Eyes Q3H PRN Basilio Brown MD      atorvastatin  40 mg Oral Daily With Dinner WALLY Baptiste      benztropine  1 mg Intramuscular Q4H PRN Max 6/day Basilio Brown MD      benztropine  1 mg Oral Q4H PRN Max 6/day Basilio Brown MD      Cholecalciferol  2,500 Units Oral Daily Dustin Mcallister MD      cyanocobalamin  1,000 mcg Oral Daily WALLY Baptiste      Diclofenac Sodium  2 g Topical 4x Daily PRN WALLY Baptiste      hydrOXYzine HCL  50 mg Oral Q6H PRN Max 4/day Basilio Brown MD      Or    diphenhydrAMINE  50 mg Intramuscular Q6H PRN Basilio Brown MD      diphenhydrAMINE-zinc acetate   Topical Daily PRN Raj Guerrero MD      famotidine  20 mg Oral BID WALLY Baptiste      hydrOXYzine HCL  100 mg Oral Q6H PRN Max 4/day Basilio Brown MD      Or    LORazepam  2 mg Intramuscular Q6H PRN Basilio Brown MD      hydrOXYzine HCL  25 mg Oral Q6H PRN Max 4/day Basilio Brown MD      levothyroxine  37.5 mcg Oral Early Morning WALLY Baptiste      melatonin  6 mg Oral HS  PRN WALLY Gomez      methocarbamol  500 mg Oral Q6H PRN WALLY Baptiste      OLANZapine  5 mg Oral Q4H PRN Max 3/day Basilio Brown MD      Or    OLANZapine  2.5 mg Intramuscular Q4H PRN Max 3/day Basilio Brown MD      OLANZapine  5 mg Oral Q3H PRN Max 3/day Basilio Brown MD      Or    OLANZapine  5 mg Intramuscular Q3H PRN Max 3/day Basilio Brown MD      OLANZapine  2.5 mg Oral Q4H PRN Max 6/day Basilio Brown MD      polyethylene glycol  17 g Oral Daily PRN Basilio Brown MD      propranolol  10 mg Oral Q8H PRN Basilio Brown MD      senna-docusate sodium  2 tablet Oral After Dinner Dustin Mcallister MD      sertraline  150 mg Oral Daily WALLY Gomez      traZODone  50 mg Oral HS PRN Basilio Brown MD          Risks/Benefits of Treatment:     Risks, benefits, and possible side effects of medications explained to patient and patient verbalizes understanding and agreement for treatment.    Progress Toward Goals: progressing    Treatment Planning:     All current active medications have been reviewed.  Continue to monitor response to treatment and assess for potential side effects of medications.  Encourage group therapy, milieu therapy and occupational therapy  Collaboration with medical service for medical comorbidities as indicated.  Behavioral Health checks for safety monitoring.    Estimated Discharge Day: 2/28/2025   Legal Status : Voluntary 201 commitment.    Subjective     Behavior over the last 24 hours: unchanged    Sleep: normal  Appetite: normal  Medication side effects: No  ROS: review of systems as noted above in HPI/Subjective report, all other systems are negative    Objective :  Temp:  [97.5 °F (36.4 °C)-97.8 °F (36.6 °C)] 97.5 °F (36.4 °C)  HR:  [67-73] 67  BP: (112-133)/(57-82) 112/57  Resp:  [16-18] 16  SpO2:  [95 %-97 %] 97 %  O2 Device: None (Room air)    Temp:  [97.5 °F (36.4 °C)-97.8 °F (36.6 °C)] 97.5 °F (36.4  "°C)  HR:  [67-73] 67  BP: (112-133)/(57-82) 112/57  Resp:  [16-18] 16  SpO2:  [95 %-97 %] 97 %  O2 Device: None (Room air)      Mental Status Evaluation:     Appearance:  Disheveled, bearded   Behavior:  Coperative, fair eye contact   Speech:  fluent   Mood:  \"Ok\"   Affect:  constricted   Thought Process:  concrete   Thought Content:  no overt delusions   Perceptual Disturbances: no auditory hallucinations, no visual hallucinations   Risk Potential: Suicidal Ideation -  None at present  Homicidal Ideation -  None at present  Potential for Aggression - Not at present   Sensorium:  oriented to person, place, and time/date   Memory:  recent and remote memory grossly intact   Consciousness:  alert and awake   Attention/Concentration: attention span and concentration appear shorter than expected for age   Insight:  fair   Judgment: fair       Lab Results: I have reviewed the following results:  Results from the past 24 hours: No results found for this or any previous visit (from the past 24 hours).    Administrative Statements     Counseling / Coordination of Care:   I have spent a total time of 30 minutes in caring for this patient on the day of the visit/encounter including:  Patient's progress discussed with staff in treatment team meeting.  Medication changes reviewed with staff in treatment team meeting..    Geraldine Leonard MD 02/16/25  "

## 2025-02-16 NOTE — NURSING NOTE
PT is calm, cooperative walking in hallway isolative to self. Denies SI and pain.  No distress noted.  Will continue to monitor.

## 2025-02-16 NOTE — NURSING NOTE
Pt is calm, cooperative, isolating to self and withdrawn to his room. Pt will answer questions but does not hold a conversation. Pt denies SI, HI, AH, VH and pain at this time. Pt does not attend groups but is meal/ medication complaint. Pt denies any unmet needs and remains on q15 min checks for safety.

## 2025-02-17 PROCEDURE — 99232 SBSQ HOSP IP/OBS MODERATE 35: CPT | Performed by: PSYCHIATRY & NEUROLOGY

## 2025-02-17 RX ADMIN — CYANOCOBALAMIN TAB 1000 MCG 1000 MCG: 1000 TAB at 08:22

## 2025-02-17 RX ADMIN — SERTRALINE HYDROCHLORIDE 150 MG: 100 TABLET ORAL at 08:22

## 2025-02-17 RX ADMIN — Medication 2500 UNITS: at 08:22

## 2025-02-17 RX ADMIN — ATORVASTATIN CALCIUM 40 MG: 40 TABLET, FILM COATED ORAL at 15:53

## 2025-02-17 RX ADMIN — SENNOSIDES AND DOCUSATE SODIUM 2 TABLET: 50; 8.6 TABLET ORAL at 17:18

## 2025-02-17 RX ADMIN — ARIPIPRAZOLE 5 MG: 5 TABLET ORAL at 08:22

## 2025-02-17 RX ADMIN — FAMOTIDINE 20 MG: 20 TABLET ORAL at 17:18

## 2025-02-17 RX ADMIN — LEVOTHYROXINE SODIUM 37.5 MCG: 0.07 TABLET ORAL at 06:41

## 2025-02-17 RX ADMIN — FAMOTIDINE 20 MG: 20 TABLET ORAL at 08:22

## 2025-02-17 NOTE — PROGRESS NOTES
"Progress Note - Behavioral Health   Name: Franco Roberson 39 y.o. male I MRN: [id number]  Unit/Bed#: -01 I Date of Admission: 5/14/2024   Date of Service: 2/17/2025 I Hospital Day: 279     Assessment & Plan  MDD (major depressive disorder), recurrent severe, without psychosis (HCC)  Patient continues to do well and the plan is to continue with the current treatment plan as mentioned below with no changes.    Patient continues to be pending group home placement. Otherwise no clinical significant change.   continues to look for group home/personal-care home placement however this can be significantly impacted by patient no longer having money in the able account and his bank account being over drawn.  Patient is quite altruistic in his desire not to have his friend go to retirement however continues to display poor to limited insight regarding his ability to advocate for himself and organize/prioritize his own needs given his own difficult social situation.    Medication regimen as follows, no changes as of 2/17/2025:  Abilify 5 mg daily as mood adjunct   Zoloft 150 mg daily for depression and anxiety  Continue to encourage participation in group therapy, milieu therapy and occupational therapy.  Continue to assess for side effects of medications.  Continue collaboration with [facility] for medical co-morbidities as indicated.  Continue discussion with CM/SW to assist with obtaining collateral, disposition planning, and the implementation of patient-centered individualized plan of care.  Continue frequent safety checks and vitals per unit protocol.    Risks, benefits and possible side effects of Medications: Risks, benefits, and possible side effects of medications have previously been explained. No new medications at this time.      Legal status: 201    Disposition: to be determined, pending SOAR application for potential group home placement. OT Cognitive Evaluation completed: \"Pt would benefit from discharge to " "a supportive environment that can provide checks for safety and compliance with IADLs. \"   Although patient's mood has stabilized, they are currently awaiting group home placement.  Their ability to recognize safety hazards take medications and participate in IADLs are significantly impaired by their cognitive deficits compounded by their chronic mental health needs and would be at risk for significant decompensation without the structure and support of a group home setting.      No associated orders from this encounter found during lookback period of 72 hours.  Autism spectrum disorder  Continue supportive care    No associated orders from this encounter found during lookback period of 72 hours.    Hyperlipidemia  - Lipitor 40 mg       Plan     Recommended Treatment:    - Encourage early mobility and having a structured day  - Provide frequent re-orientation, and cognitive stimulation  - Ensure assistive devices are in proper working order (eye-glasses, hearing aids)  - Encourage adequate hydration, nutrition and monitor bowel movements  - Maintain sleep-wake cycle: Uninterrupted sleep time; low-level lighting at night  - Fall precaution  - f/u [facility] recs regarding the medical problems   - Continue medication titration and treatment plan; adjust medication to optimize treatment response and as clinically indicated. .   - Observation: routine            - VS: as per unit protocol  - Diet: Regular diet  - Encourage group attendance and milieu therapy  - Dispo: To be determined     Scheduled medications:  Current Facility-Administered Medications   Medication Dose Route Frequency Provider Last Rate    acetaminophen  650 mg Oral Q6H PRN Basilio Brown MD      acetaminophen  650 mg Oral Q4H PRN Basilio Brown MD      acetaminophen  975 mg Oral Q6H PRN Basilio Brown MD      aluminum-magnesium hydroxide-simethicone  30 mL Oral Q4H PRN Basilio Brown MD      ARIPiprazole  5 mg Oral Daily Basilio Aldana " MD Amarilys      Artificial Tears  1 drop Both Eyes Q3H PRN Basilio Brown MD      atorvastatin  40 mg Oral Daily With Dinner WALLY Baptiste      benztropine  1 mg Intramuscular Q4H PRN Max 6/day Basilio Brown MD      benztropine  1 mg Oral Q4H PRN Max 6/day Basilio Brown MD      Cholecalciferol  2,500 Units Oral Daily Dustin Mcallister MD      cyanocobalamin  1,000 mcg Oral Daily WALLY Baptiste      Diclofenac Sodium  2 g Topical 4x Daily PRN WALLY Baptiste      hydrOXYzine HCL  50 mg Oral Q6H PRN Max 4/day Basilio Brown MD      Or    diphenhydrAMINE  50 mg Intramuscular Q6H PRN Basilio Brown MD      diphenhydrAMINE-zinc acetate   Topical Daily PRN Raj Guerrero MD      famotidine  20 mg Oral BID WALLY Baptiste      hydrOXYzine HCL  100 mg Oral Q6H PRN Max 4/day Basilio Brown MD      Or    LORazepam  2 mg Intramuscular Q6H PRN Basilio Brown MD      hydrOXYzine HCL  25 mg Oral Q6H PRN Max 4/day Basilio Brown MD      levothyroxine  37.5 mcg Oral Early Morning WALLY Baptiste      melatonin  6 mg Oral HS PRN WALLY Gomez      methocarbamol  500 mg Oral Q6H PRN WALLY Baptiste      OLANZapine  5 mg Oral Q4H PRN Max 3/day Basilio Brown MD      Or    OLANZapine  2.5 mg Intramuscular Q4H PRN Max 3/day Basilio Brown MD      OLANZapine  5 mg Oral Q3H PRN Max 3/day Basilio Brown MD      Or    OLANZapine  5 mg Intramuscular Q3H PRN Max 3/day Basilio Brown MD      OLANZapine  2.5 mg Oral Q4H PRN Max 6/day Basilio Brown MD      polyethylene glycol  17 g Oral Daily PRN Basilio Brown MD      propranolol  10 mg Oral Q8H PRN Basilio Brown MD      senna-docusate sodium  2 tablet Oral After Dinner Dustin Mcallister MD      sertraline  150 mg Oral Daily WALLY Gomez      traZODone  50 mg Oral HS PRN Basilio Brown MD        PRN:     "acetaminophen    acetaminophen    acetaminophen    aluminum-magnesium hydroxide-simethicone    Artificial Tears    benztropine    benztropine    Diclofenac Sodium    hydrOXYzine HCL **OR** diphenhydrAMINE    diphenhydrAMINE-zinc acetate    hydrOXYzine HCL **OR** LORazepam    hydrOXYzine HCL    melatonin    methocarbamol    OLANZapine **OR** OLANZapine    OLANZapine **OR** OLANZapine    OLANZapine    polyethylene glycol    propranolol    traZODone       Subjective     Patient was visited on unit for continuing care; chart reviewed and discussed with multidisciplinary treatment team.  On approach, the patient was calm and cooperative. On approach, the patient was calm and cooperative but did endorse some more anxiety secondary to his financial situation.  Patient notes that he and disputed the charges of his friend illicitly taking money from his bank account.  Patient reports that he does not want his friend to go to skilled nursing over this.  Given this new situation, social work informed me that patient's Social Security benefits are now at significant risk. No problem initiating and maintaining sleep.  Denied A/VH currently.  Denied SI/HI, intent or plan upon direct inquiry at this time.    Patient continues to be visible in the milieu and interacts with staff and peers. No reports of aggression or self-injurious behavior on unit. No PRN medications used in the past 24 hours.    Patient accepted all offered medications and no adverse effects of medications noted or reported.    Objective    Current Mental Status Evaluation:  Mental Status Exam  Appearance: casually dressed, consistent with stated age  Motor: no psychomotor retardation, no gait abnormalities  Behavior: cooperative, answers questions appropriately  Speech: soft, normal rhythm  Mood: \"anxious\"  Affect: constricted, depressed-appearing  Thought Process: linear and goal-oriented  Thought Content: denies delusions  Risk Potential: denies suicidal ideation, plan, " or intent. Denies homicidal ideation  Perceptions: denies auditory hallucinations, denies visual hallucinations,   Sensorium: Oriented to person, place, time, and situation  Cognition: cognitive ability appears intact but was not quantitatively tested  Consciousness: alert and awake  Attention: intact, able to focus without difficulty  Insight: limited  Judgement: limited            Vital signs in last 24 hours:    Temp:  [97.5 °F (36.4 °C)-98.1 °F (36.7 °C)] 97.5 °F (36.4 °C)  HR:  [66-94] 66  BP: (108-128)/(56-65) 108/56  Resp:  [16] 16  SpO2:  [95 %-96 %] 96 %  O2 Device: None (Room air)    Psychiatric Review of Systems:  Medication adverse effects: none  Sleep: unchanged  Appetite: unchanged  Behaviors over the past 24 hours: unchanged    Laboratory results:    I have personally reviewed all pertinent laboratory/tests results  No results found for this or any previous visit (from the past 48 hours).       Progress Toward Goals & Illness Status:   progressing, attends groups, participates in milieu therapy, insight remains poor, placement pending    Patient is not at goal. They are not yet ready for discharge. The patient's condition currently requires active psychopharmacological medication management, interdisciplinary coordination with case management, and the utilization of adjunctive milieu and group therapy to augment psychopharmacological efficacy. The patient's risk of morbidity, and progression or decompensation of psychiatric disease, is higher without this current treatment.     Next of Kin  Extended Emergency Contact Information  Primary Emergency Contact: Lois Roberson  Address: [address] [place] [address] [place]  Home Phone: [phone]  Relation: Mother    Counseling / Coordination of Care  Patient's progress discussed with staff in treatment team meeting.  Medications, treatment progress and treatment plan reviewed with patient.  Medication  education provided to patient.  Educated on importance of medication and treatment compliance.  Supportive therapy provided to patient.  Cognitive techniques utilized during the session.  Reassurance and supportive therapy provided.  Reoriented to reality and reassured.  Encouraged participation in milieu and group therapy on the unit.  Crisis/safety plan discussed with patient.       Dustin Mcallister MD  Attending Psychiatrist   [facility]      This note has been constructed using a voice recognition system. There may be translation, syntax, or grammatical errors. If you have any questions, please contact the dictating provider.

## 2025-02-17 NOTE — ASSESSMENT & PLAN NOTE
"Patient continues to do well and the plan is to continue with the current treatment plan as mentioned below with no changes.    Patient continues to be pending group home placement. Otherwise no clinical significant change.   continues to look for group home/personal-care home placement however this can be significantly impacted by patient no longer having money in the able account and his bank account being over drawn.  Patient is quite altruistic in his desire not to have his friend go to senior care however continues to display poor to limited insight regarding his ability to advocate for himself and organize/prioritize his own needs given his own difficult social situation.    Medication regimen as follows, no changes as of 2/17/2025:  Abilify 5 mg daily as mood adjunct   Zoloft 150 mg daily for depression and anxiety  Continue to encourage participation in group therapy, milieu therapy and occupational therapy.  Continue to assess for side effects of medications.  Continue collaboration with [facility] for medical co-morbidities as indicated.  Continue discussion with CM/SW to assist with obtaining collateral, disposition planning, and the implementation of patient-centered individualized plan of care.  Continue frequent safety checks and vitals per unit protocol.    Risks, benefits and possible side effects of Medications: Risks, benefits, and possible side effects of medications have previously been explained. No new medications at this time.      Legal status: 201    Disposition: to be determined, pending SOAR application for potential group home placement. OT Cognitive Evaluation completed: \"Pt would benefit from discharge to a supportive environment that can provide checks for safety and compliance with IADLs. \"   Although patient's mood has stabilized, they are currently awaiting group home placement.  Their ability to recognize safety hazards take medications and participate in IADLs are significantly " impaired by their cognitive deficits compounded by their chronic mental health needs and would be at risk for significant decompensation without the structure and support of a group home setting.      No associated orders from this encounter found during lookback period of 72 hours.

## 2025-02-17 NOTE — NURSING NOTE
PT is calm cooperative, Denies pain and SI. PT in hallway no distress noted at this time.  Will continue to monitor.

## 2025-02-17 NOTE — PROGRESS NOTES
02/17/25 0842   Team Meeting   Meeting Type Daily Rounds   Team Members Present   Team Members Present Physician;Nurse;   Physician Team Member Ary   [facility]   Patient/Family Present   Patient Present No   Patient's Family Present No     Pt is medication and meal compliant. Pt is calm and cooperative. Pt denies SI/HI/AVH. Pt's discharge is pending placement.

## 2025-02-17 NOTE — PLAN OF CARE
Pt intermittently attending groups. Pt typically does not attend groups over the weekend. During the week pt typically attends at least one group daily.

## 2025-02-17 NOTE — PLAN OF CARE
Problem: Depression  Goal: Treatment Goal: Demonstrate behavioral control of depressive symptoms, verbalize feelings of improved mood/affect, and adopt new coping skills prior to discharge  Outcome: Progressing  Goal: Verbalize thoughts and feelings  Description: Interventions:  - Assess and re-assess patient's level of risk   - Engage patient in 1:1 interactions, daily, for a minimum of 15 minutes   - Encourage patient to express feelings, fears, frustrations, hopes   Outcome: Progressing  Goal: Refrain from harming self  Description: Interventions:  - Monitor patient closely, per order   - Supervise medication ingestion, monitor effects and side effects   Outcome: Progressing  Goal: Refrain from isolation  Description: Interventions:  - Develop a trusting relationship   - Encourage socialization   Outcome: Progressing     Problem: Anxiety  Goal: Anxiety is at manageable level  Description: Interventions:  - Assess and monitor patient's anxiety level.   - Monitor for signs and symptoms (heart palpitations, chest pain, shortness of breath, headaches, nausea, feeling jumpy, restlessness, irritable, apprehensive).   - Collaborate with interdisciplinary team and initiate plan and interventions as ordered.  - [place] patient to unit/surroundings  - Explain treatment plan  - Encourage participation in care  - Encourage verbalization of concerns/fears  - Identify coping mechanisms  - Assist in developing anxiety-reducing skills  - Administer/offer alternative therapies  - Limit or eliminate stimulants  Outcome: Progressing     Problem: DISCHARGE PLANNING - CARE MANAGEMENT  Goal: Discharge to post-acute care or home with appropriate resources  Description: INTERVENTIONS:  - Conduct assessment to determine patient/family and health care team treatment goals, and need for post-acute services based on payer coverage, community resources, and patient preferences, and barriers to discharge  - Address psychosocial, clinical,  and financial barriers to discharge as identified in assessment in conjunction with the patient/family and health care team  - Arrange appropriate level of post-acute services according to patient’s   needs and preference and payer coverage in collaboration with the physician and health care team  - Communicate with and update the patient/family, physician, and health care team regarding progress on the discharge plan  - Arrange appropriate transportation to post-acute venues  Outcome: Progressing     Problem: Knowledge Deficit  Goal: Patient/family/caregiver demonstrates understanding of disease process, treatment plan, medications, and discharge instructions  Description: Complete learning assessment and assess knowledge base.  Interventions:  - Provide teaching at level of understanding  - Provide teaching via preferred learning methods  Outcome: Progressing     Problem: SLEEP DISTURBANCE  Goal: Will exhibit normal sleeping pattern  Description: Interventions:  -  Assess the patients sleep pattern, noting recent changes  - Administer medication as ordered  - Decrease environmental stimuli, including noise, as appropriate during the night  - Encourage the patient to actively participate in unit groups and or exercise during the day to enhance ability to achieve adequate sleep at night  - Assess the patient, in the morning, encouraging a description of sleep experience  Outcome: Progressing     Problem: Ineffective Coping  Goal: Participates in unit activities  Description: Interventions:  - Provide therapeutic environment   - Provide required programming   - Redirect inappropriate behaviors   Outcome: Not Progressing

## 2025-02-17 NOTE — NURSING NOTE
Pleasant, calm and cooperative. Denies symptoms. Seclusive to his room as of this time. Waiting placement.

## 2025-02-18 PROCEDURE — 99232 SBSQ HOSP IP/OBS MODERATE 35: CPT | Performed by: PSYCHIATRY & NEUROLOGY

## 2025-02-18 RX ADMIN — LEVOTHYROXINE SODIUM 37.5 MCG: 0.07 TABLET ORAL at 06:36

## 2025-02-18 RX ADMIN — ATORVASTATIN CALCIUM 40 MG: 40 TABLET, FILM COATED ORAL at 17:59

## 2025-02-18 RX ADMIN — SERTRALINE HYDROCHLORIDE 150 MG: 100 TABLET ORAL at 08:04

## 2025-02-18 RX ADMIN — CYANOCOBALAMIN TAB 1000 MCG 1000 MCG: 1000 TAB at 08:04

## 2025-02-18 RX ADMIN — FAMOTIDINE 20 MG: 20 TABLET ORAL at 17:59

## 2025-02-18 RX ADMIN — SENNOSIDES AND DOCUSATE SODIUM 2 TABLET: 50; 8.6 TABLET ORAL at 17:59

## 2025-02-18 RX ADMIN — Medication 2500 UNITS: at 08:04

## 2025-02-18 RX ADMIN — FAMOTIDINE 20 MG: 20 TABLET ORAL at 08:04

## 2025-02-18 RX ADMIN — ARIPIPRAZOLE 5 MG: 5 TABLET ORAL at 08:04

## 2025-02-18 NOTE — PLAN OF CARE
Problem: Ineffective Coping  Goal: Participates in unit activities  Description: Interventions:  - Provide therapeutic environment   - Provide required programming   - Redirect inappropriate behaviors   Outcome: Progressing     Problem: Depression  Goal: Treatment Goal: Demonstrate behavioral control of depressive symptoms, verbalize feelings of improved mood/affect, and adopt new coping skills prior to discharge  Outcome: Progressing  Goal: Verbalize thoughts and feelings  Description: Interventions:  - Assess and re-assess patient's level of risk   - Engage patient in 1:1 interactions, daily, for a minimum of 15 minutes   - Encourage patient to express feelings, fears, frustrations, hopes   Outcome: Progressing  Goal: Refrain from harming self  Description: Interventions:  - Monitor patient closely, per order   - Supervise medication ingestion, monitor effects and side effects   Outcome: Progressing  Goal: Refrain from isolation  Description: Interventions:  - Develop a trusting relationship   - Encourage socialization   Outcome: Progressing     Problem: Anxiety  Goal: Anxiety is at manageable level  Description: Interventions:  - Assess and monitor patient's anxiety level.   - Monitor for signs and symptoms (heart palpitations, chest pain, shortness of breath, headaches, nausea, feeling jumpy, restlessness, irritable, apprehensive).   - Collaborate with interdisciplinary team and initiate plan and interventions as ordered.  - [place] patient to unit/surroundings  - Explain treatment plan  - Encourage participation in care  - Encourage verbalization of concerns/fears  - Identify coping mechanisms  - Assist in developing anxiety-reducing skills  - Administer/offer alternative therapies  - Limit or eliminate stimulants  Outcome: Progressing     Problem: DISCHARGE PLANNING - CARE MANAGEMENT  Goal: Discharge to post-acute care or home with appropriate resources  Description: INTERVENTIONS:  - Conduct assessment to  determine patient/family and health care team treatment goals, and need for post-acute services based on payer coverage, community resources, and patient preferences, and barriers to discharge  - Address psychosocial, clinical, and financial barriers to discharge as identified in assessment in conjunction with the patient/family and health care team  - Arrange appropriate level of post-acute services according to patient’s   needs and preference and payer coverage in collaboration with the physician and health care team  - Communicate with and update the patient/family, physician, and health care team regarding progress on the discharge plan  - Arrange appropriate transportation to post-acute venues  Outcome: Progressing     Problem: Knowledge Deficit  Goal: Patient/family/caregiver demonstrates understanding of disease process, treatment plan, medications, and discharge instructions  Description: Complete learning assessment and assess knowledge base.  Interventions:  - Provide teaching at level of understanding  - Provide teaching via preferred learning methods  Outcome: Progressing     Problem: SLEEP DISTURBANCE  Goal: Will exhibit normal sleeping pattern  Description: Interventions:  -  Assess the patients sleep pattern, noting recent changes  - Administer medication as ordered  - Decrease environmental stimuli, including noise, as appropriate during the night  - Encourage the patient to actively participate in unit groups and or exercise during the day to enhance ability to achieve adequate sleep at night  - Assess the patient, in the morning, encouraging a description of sleep experience  Outcome: Progressing

## 2025-02-18 NOTE — NURSING NOTE
Compliant with medication. Denies all psych symptom. Seclusive to his room. Disheveled. Waiting placement.

## 2025-02-18 NOTE — NURSING NOTE
Patient is in and out of the community. Patient social with select peers and observed pacing the unit with peers. Patient denies all psych symptoms. Patient compliant with scheduled medications. Q15 observation maintained.

## 2025-02-18 NOTE — PROGRESS NOTES
Progress Note - Behavioral Health   Name: Franco Roberson 39 y.o. male I MRN: [id number]  Unit/Bed#: -01 I Date of Admission: 5/14/2024   Date of Service: 2/18/2025 I Hospital Day: 280     Assessment & Plan  MDD (major depressive disorder), recurrent severe, without psychosis (HCC)  Patient continues to do well and the plan is to continue with the current treatment plan as mentioned below with no changes.    Patient continues to be pending group home placement. Otherwise no clinical significant change.   continues to look for group home/personal-care home placement however this can be significantly impacted by patient no longer having money in the [employer] account and his bank account being over drawn.  Patient is quite altruistic in his desire not to have his friend go to skilled nursing however continues to display poor to limited insight regarding his ability to advocate for himself and organize/prioritize his own needs given his own difficult social situation. Would be at significant risk of harm if not discharged to a structured setting with support.    Medication regimen as follows, no changes as of 2/18/2025:  Abilify 5 mg daily as mood adjunct   Zoloft 150 mg daily for depression and anxiety  Continue to encourage participation in group therapy, milieu therapy and occupational therapy.  Continue to assess for side effects of medications.  Continue collaboration with PATRICIA for medical co-morbidities as indicated.  Continue discussion with CM/SW to assist with obtaining collateral, disposition planning, and the implementation of patient-centered individualized plan of care.  Continue frequent safety checks and vitals per unit protocol.    Risks, benefits and possible side effects of Medications: Risks, benefits, and possible side effects of medications have previously been explained. No new medications at this time.      Legal status: 201    Disposition: to be determined, pending SOAR application for potential  "group home placement. OT Cognitive Evaluation completed: \"Pt would benefit from discharge to a supportive environment that can provide checks for safety and compliance with IADLs. \"   Although patient's mood has stabilized, they are currently awaiting group home placement.  Their ability to recognize safety hazards take medications and participate in IADLs are significantly impaired by their cognitive deficits compounded by their chronic mental health needs and would be at risk for significant decompensation without the structure and support of a group home setting.      No associated orders from this encounter found during lookback period of 72 hours.  Autism spectrum disorder  Continue supportive care    No associated orders from this encounter found during lookback period of 72 hours.    Hyperlipidemia  - Lipitor 40 mg       Plan     Recommended Treatment:    - Encourage early mobility and having a structured day  - Provide frequent re-orientation, and cognitive stimulation  - Ensure assistive devices are in proper working order (eye-glasses, hearing aids)  - Encourage adequate hydration, nutrition and monitor bowel movements  - Maintain sleep-wake cycle: Uninterrupted sleep time; low-level lighting at night  - Fall precaution  - f/u [facility] recs regarding the medical problems   - Continue medication titration and treatment plan; adjust medication to optimize treatment response and as clinically indicated. .   - Observation: routine            - VS: as per unit protocol  - Diet: Regular diet  - Encourage group attendance and milieu therapy  - Dispo: To be determined     Scheduled medications:  Current Facility-Administered Medications   Medication Dose Route Frequency Provider Last Rate    acetaminophen  650 mg Oral Q6H PRN Basilio Brown MD      acetaminophen  650 mg Oral Q4H PRN Basilio Brown MD      acetaminophen  975 mg Oral Q6H PRN Basilio Brown MD      aluminum-magnesium hydroxide-simethicone  " 30 mL Oral Q4H PRN Basilio Brown MD      ARIPiprazole  5 mg Oral Daily Basilio Panda MD      Artificial Tears  1 drop Both Eyes Q3H PRN Basilio Brown MD      atorvastatin  40 mg Oral Daily With Dinner WALLY Baptiste      benztropine  1 mg Intramuscular Q4H PRN Max 6/day Basilio Brown MD      benztropine  1 mg Oral Q4H PRN Max 6/day Basilio Brown MD      Cholecalciferol  2,500 Units Oral Daily Dustin Mcallister MD      cyanocobalamin  1,000 mcg Oral Daily WALLY Baptiste      Diclofenac Sodium  2 g Topical 4x Daily PRN WALLY Baptiste      hydrOXYzine HCL  50 mg Oral Q6H PRN Max 4/day Basilio Brown MD      Or    diphenhydrAMINE  50 mg Intramuscular Q6H PRN Basilio Brown MD      diphenhydrAMINE-zinc acetate   Topical Daily PRN Raj Guerrero MD      famotidine  20 mg Oral BID WALLY Baptiste      hydrOXYzine HCL  100 mg Oral Q6H PRN Max 4/day Basilio Brown MD      Or    LORazepam  2 mg Intramuscular Q6H PRN Basilio Brown MD      hydrOXYzine HCL  25 mg Oral Q6H PRN Max 4/day Basilio Brown MD      levothyroxine  37.5 mcg Oral Early Morning WALLY Baptiste      melatonin  6 mg Oral HS PRN WALLY Gomez      methocarbamol  500 mg Oral Q6H PRN WALLY Baptiste      OLANZapine  5 mg Oral Q4H PRN Max 3/day Basilio Brown MD      Or    OLANZapine  2.5 mg Intramuscular Q4H PRN Max 3/day Basiloi Brown MD      OLANZapine  5 mg Oral Q3H PRN Max 3/day Basilio Brown MD      Or    OLANZapine  5 mg Intramuscular Q3H PRN Max 3/day Basilio Brown MD      OLANZapine  2.5 mg Oral Q4H PRN Max 6/day Basilio Brown MD      polyethylene glycol  17 g Oral Daily PRN Basilio Brown MD      propranolol  10 mg Oral Q8H PRN Basilio Brown MD      senna-docusate sodium  2 tablet Oral After Dinner Dustin Mcallister MD      sertraline  150 mg Oral Daily Rachana Borrego  "WALLY Higgins      traZODone  50 mg Oral HS PRN Basilio Brown MD        PRN:    acetaminophen    acetaminophen    acetaminophen    aluminum-magnesium hydroxide-simethicone    Artificial Tears    benztropine    benztropine    Diclofenac Sodium    hydrOXYzine HCL **OR** diphenhydrAMINE    diphenhydrAMINE-zinc acetate    hydrOXYzine HCL **OR** LORazepam    hydrOXYzine HCL    melatonin    methocarbamol    OLANZapine **OR** OLANZapine    OLANZapine **OR** OLANZapine    OLANZapine    polyethylene glycol    propranolol    traZODone       Subjective     Patient was visited on unit for continuing care; chart reviewed and discussed with multidisciplinary treatment team.  On approach, the patient was calm and cooperative. Denied any changes in mood, appetite, and energy level. No problem initiating and maintaining sleep.  Denied A/VH currently.  Denied SI/HI, intent or plan upon direct inquiry at this time.  Patient wanting to speak with his  today regarding his bank account.  Noted that due to him and disputing his charges his bank account is now overdrawn and is concerned about the penalties to his account or what might happen to her.  I informed him that I will let social work know so that they can speak with him about this.    Patient continues to be visible in the milieu and interacts with staff and peers. No reports of aggression or self-injurious behavior on unit. No PRN medications used in the past 24 hours.    Patient accepted all offered medications and no adverse effects of medications noted or reported.    Objective    Current Mental Status Evaluation:  Mental Status Exam  Appearance: casually dressed, consistent with stated age  Motor: no psychomotor retardation, no gait abnormalities  Behavior: cooperative, answers questions appropriately  Speech: soft, normal rhythm  Mood: \"ok\"  Affect: blunted  Thought Process: linear and goal-oriented  Thought Content: denies delusions  Risk Potential: denies " suicidal ideation, plan, or intent. Denies homicidal ideation  Perceptions: denies auditory hallucinations, denies visual hallucinations,   Sensorium: Oriented to person, place, time, and situation  Cognition: cognitive ability appears intact but was not quantitatively tested  Consciousness: alert and awake  Attention: intact, able to focus without difficulty  Insight: limited  Judgement: limited          Vital signs in last 24 hours:    Temp:  [96.4 °F (35.8 °C)-97.5 °F (36.4 °C)] 97.5 °F (36.4 °C)  HR:  [57-86] 57  BP: (108-130)/(60-69) 108/69  Resp:  [18] 18  SpO2:  [95 %] 95 %  O2 Device: None (Room air)    Psychiatric Review of Systems:  Medication adverse effects: none  Sleep: unchanged  Appetite: unchanged  Behaviors over the past 24 hours: unchanged    Laboratory results:    I have personally reviewed all pertinent laboratory/tests results  No results found for this or any previous visit (from the past 48 hours).       Progress Toward Goals & Illness Status:   progressing, attends groups, participates in milieu therapy, placement pending    Patient is not at goal. They are not yet ready for discharge. The patient's condition currently requires active psychopharmacological medication management, interdisciplinary coordination with case management, and the utilization of adjunctive milieu and group therapy to augment psychopharmacological efficacy. The patient's risk of morbidity, and progression or decompensation of psychiatric disease, is higher without this current treatment.     Next of Kin  Extended Emergency Contact Information  Primary Emergency Contact: Lois Roberson  Address: [address] [place] [address] of [place]  Home Phone: [phone]  Relation: Mother    Counseling / Coordination of Care  Patient's progress discussed with staff in treatment team meeting.  Medications, treatment progress and treatment plan reviewed with patient.  Medication education  provided to patient.  Educated on importance of medication and treatment compliance.  Supportive therapy provided to patient.  Cognitive techniques utilized during the session.  Reassurance and supportive therapy provided.  Reoriented to reality and reassured.  Encouraged participation in milieu and group therapy on the unit.  Crisis/safety plan discussed with patient.       Dustin Mcallister MD  Attending Psychiatrist   [facility]      This note has been constructed using a voice recognition system. There may be translation, syntax, or grammatical errors. If you have any questions, please contact the dictating provider.

## 2025-02-18 NOTE — ASSESSMENT & PLAN NOTE
"Patient continues to do well and the plan is to continue with the current treatment plan as mentioned below with no changes.    Patient continues to be pending group home placement. Otherwise no clinical significant change.   continues to look for group home/personal-care home placement however this can be significantly impacted by patient no longer having money in the [employer] account and his bank account being over drawn.  Patient is quite altruistic in his desire not to have his friend go to long term however continues to display poor to limited insight regarding his ability to advocate for himself and organize/prioritize his own needs given his own difficult social situation. Would be at significant risk of harm if not discharged to a structured setting with support.    Medication regimen as follows, no changes as of 2/18/2025:  Abilify 5 mg daily as mood adjunct   Zoloft 150 mg daily for depression and anxiety  Continue to encourage participation in group therapy, milieu therapy and occupational therapy.  Continue to assess for side effects of medications.  Continue collaboration with SLIM for medical co-morbidities as indicated.  Continue discussion with CM/SW to assist with obtaining collateral, disposition planning, and the implementation of patient-centered individualized plan of care.  Continue frequent safety checks and vitals per unit protocol.    Risks, benefits and possible side effects of Medications: Risks, benefits, and possible side effects of medications have previously been explained. No new medications at this time.      Legal status: 201    Disposition: to be determined, pending SOAR application for potential group home placement. OT Cognitive Evaluation completed: \"Pt would benefit from discharge to a supportive environment that can provide checks for safety and compliance with IADLs. \"   Although patient's mood has stabilized, they are currently awaiting group home placement.  Their " ability to recognize safety hazards take medications and participate in IADLs are significantly impaired by their cognitive deficits compounded by their chronic mental health needs and would be at risk for significant decompensation without the structure and support of a group home setting.      No associated orders from this encounter found during lookback period of 72 hours.

## 2025-02-18 NOTE — NURSING NOTE
Patient secluded to room appears to be withdrawn and depressed. Patient denies depression ,SI/HI/AH/VH at this time. Compliant with medications and routine vitals.

## 2025-02-19 PROCEDURE — 99232 SBSQ HOSP IP/OBS MODERATE 35: CPT | Performed by: PSYCHIATRY & NEUROLOGY

## 2025-02-19 RX ADMIN — LEVOTHYROXINE SODIUM 37.5 MCG: 0.07 TABLET ORAL at 06:42

## 2025-02-19 RX ADMIN — SENNOSIDES AND DOCUSATE SODIUM 2 TABLET: 50; 8.6 TABLET ORAL at 17:42

## 2025-02-19 RX ADMIN — ATORVASTATIN CALCIUM 40 MG: 40 TABLET, FILM COATED ORAL at 17:42

## 2025-02-19 RX ADMIN — Medication 2500 UNITS: at 08:23

## 2025-02-19 RX ADMIN — SERTRALINE HYDROCHLORIDE 150 MG: 100 TABLET ORAL at 08:23

## 2025-02-19 RX ADMIN — FAMOTIDINE 20 MG: 20 TABLET ORAL at 08:23

## 2025-02-19 RX ADMIN — ARIPIPRAZOLE 5 MG: 5 TABLET ORAL at 08:23

## 2025-02-19 RX ADMIN — FAMOTIDINE 20 MG: 20 TABLET ORAL at 17:42

## 2025-02-19 RX ADMIN — CYANOCOBALAMIN TAB 1000 MCG 1000 MCG: 1000 TAB at 08:23

## 2025-02-19 NOTE — ASSESSMENT & PLAN NOTE
"Patient continues to do well and the plan is to continue with the current treatment plan as mentioned below with no changes.    Patient continues to be pending group home placement. Otherwise no clinical significant change.   continues to look for group home/personal-care home placement however this can be significantly impacted by patient no longer having money in the [employer] account and his bank account being over drawn.  Patient is quite altruistic in his desire not to have his friend go to [facility] however continues to display poor to limited insight regarding his ability to advocate for himself and organize/prioritize his own needs given his own difficult social situation. Would be at significant risk of harm if not discharged to a structured setting with support.    Medication regimen as follows, no changes as of 2/19/2025:  Abilify 5 mg daily as mood adjunct   Zoloft 150 mg daily for depression and anxiety  Continue to encourage participation in group therapy, milieu therapy and occupational therapy.  Continue to assess for side effects of medications.  Continue collaboration with SLIM for medical co-morbidities as indicated.  Continue discussion with CM/SW to assist with obtaining collateral, disposition planning, and the implementation of patient-centered individualized plan of care.  Continue frequent safety checks and vitals per unit protocol.    Risks, benefits and possible side effects of Medications: Risks, benefits, and possible side effects of medications have previously been explained. No new medications at this time.      Legal status: 201    Disposition: to be determined, pending SOAR application for potential group home placement. OT Cognitive Evaluation completed: \"Pt would benefit from discharge to a supportive environment that can provide checks for safety and compliance with IADLs. \"   Although patient's mood has stabilized, they are currently awaiting group home placement.  Their " ability to recognize safety hazards take medications and participate in IADLs are significantly impaired by their cognitive deficits compounded by their chronic mental health needs and would be at risk for significant decompensation without the structure and support of a group home setting.      No associated orders from this encounter found during lookback period of 72 hours.

## 2025-02-19 NOTE — PROGRESS NOTES
Progress Note - Behavioral Health   Name: Franco Roberson 39 y.o. male I MRN: [id number]  Unit/Bed#: -01 I Date of Admission: 5/14/2024   Date of Service: 2/19/2025 I Hospital Day: 281     Assessment & Plan  MDD (major depressive disorder), recurrent severe, without psychosis (HCC)  Patient continues to do well and the plan is to continue with the current treatment plan as mentioned below with no changes.    Patient continues to be pending group home placement. Otherwise no clinical significant change.   continues to look for group home/personal-care home placement however this can be significantly impacted by patient no longer having money in the [employer] account and his bank account being over drawn.  Patient is quite altruistic in his desire not to have his friend go to assisted however continues to display poor to limited insight regarding his ability to advocate for himself and organize/prioritize his own needs given his own difficult social situation. Would be at significant risk of harm if not discharged to a structured setting with support.    Medication regimen as follows, no changes as of 2/19/2025:  Abilify 5 mg daily as mood adjunct   Zoloft 150 mg daily for depression and anxiety  Continue to encourage participation in group therapy, milieu therapy and occupational therapy.  Continue to assess for side effects of medications.  Continue collaboration with PATRICIA for medical co-morbidities as indicated.  Continue discussion with CM/SW to assist with obtaining collateral, disposition planning, and the implementation of patient-centered individualized plan of care.  Continue frequent safety checks and vitals per unit protocol.    Risks, benefits and possible side effects of Medications: Risks, benefits, and possible side effects of medications have previously been explained. No new medications at this time.      Legal status: 201    Disposition: to be determined, pending SOAR application for potential  "group home placement. OT Cognitive Evaluation completed: \"Pt would benefit from discharge to a supportive environment that can provide checks for safety and compliance with IADLs. \"   Although patient's mood has stabilized, they are currently awaiting group home placement.  Their ability to recognize safety hazards take medications and participate in IADLs are significantly impaired by their cognitive deficits compounded by their chronic mental health needs and would be at risk for significant decompensation without the structure and support of a group home setting.      No associated orders from this encounter found during lookback period of 72 hours.  Autism spectrum disorder  Continue supportive care    No associated orders from this encounter found during lookback period of 72 hours.    Hyperlipidemia  - Lipitor 40 mg       Plan     Recommended Treatment:    - Encourage early mobility and having a structured day  - Provide frequent re-orientation, and cognitive stimulation  - Ensure assistive devices are in proper working order (eye-glasses, hearing aids)  - Encourage adequate hydration, nutrition and monitor bowel movements  - Maintain sleep-wake cycle: Uninterrupted sleep time; low-level lighting at night  - Fall precaution  - f/u SLIM recs regarding the medical problems   - Continue medication titration and treatment plan; adjust medication to optimize treatment response and as clinically indicated. .   - Observation: routine            - VS: as per unit protocol  - Diet: Regular diet  - Encourage group attendance and milieu therapy  - Dispo: To be determined     Scheduled medications:  Current Facility-Administered Medications   Medication Dose Route Frequency Provider Last Rate    acetaminophen  650 mg Oral Q6H PRN Basilio Brown MD      acetaminophen  650 mg Oral Q4H PRN Basilio Brown MD      acetaminophen  975 mg Oral Q6H PRN Basilio Brown MD      aluminum-magnesium hydroxide-simethicone  " 30 mL Oral Q4H PRN Basilio Brown MD      ARIPiprazole  5 mg Oral Daily Basilio Panda MD      Artificial Tears  1 drop Both Eyes Q3H PRN Basilio Brown MD      atorvastatin  40 mg Oral Daily With Dinner WALLY Baptiste      benztropine  1 mg Intramuscular Q4H PRN Max 6/day Basilio Brown MD      benztropine  1 mg Oral Q4H PRN Max 6/day Basilio Brown MD      Cholecalciferol  2,500 Units Oral Daily Dustin Mcallister MD      cyanocobalamin  1,000 mcg Oral Daily WALLY Baptiste      Diclofenac Sodium  2 g Topical 4x Daily PRN WALLY Baptiste      hydrOXYzine HCL  50 mg Oral Q6H PRN Max 4/day Basilio Brown MD      Or    diphenhydrAMINE  50 mg Intramuscular Q6H PRN Basilio Brown MD      diphenhydrAMINE-zinc acetate   Topical Daily PRN Raj Guerrero MD      famotidine  20 mg Oral BID WALLY Baptiste      hydrOXYzine HCL  100 mg Oral Q6H PRN Max 4/day Basilio Brown MD      Or    LORazepam  2 mg Intramuscular Q6H PRN Basilio Brown MD      hydrOXYzine HCL  25 mg Oral Q6H PRN Max 4/day Basilio Brown MD      levothyroxine  37.5 mcg Oral Early Morning WALLY Baptiste      melatonin  6 mg Oral HS PRN WALLY Gomez      methocarbamol  500 mg Oral Q6H PRN WALLY Baptiste      OLANZapine  5 mg Oral Q4H PRN Max 3/day Basilio Brown MD      Or    OLANZapine  2.5 mg Intramuscular Q4H PRN Max 3/day Basilio Brown MD      OLANZapine  5 mg Oral Q3H PRN Max 3/day Basilio Brown MD      Or    OLANZapine  5 mg Intramuscular Q3H PRN Max 3/day Basilio Brown MD      OLANZapine  2.5 mg Oral Q4H PRN Max 6/day Basilio Brown MD      polyethylene glycol  17 g Oral Daily PRN Basilio Brown MD      propranolol  10 mg Oral Q8H PRN Basilio Brown MD      senna-docusate sodium  2 tablet Oral After Dinner Dustin Mcallister MD      sertraline  150 mg Oral Daily Rachana Borrego  "WALLY Higgins      traZODone  50 mg Oral HS PRN Basilio Brown MD        PRN:    acetaminophen    acetaminophen    acetaminophen    aluminum-magnesium hydroxide-simethicone    Artificial Tears    benztropine    benztropine    Diclofenac Sodium    hydrOXYzine HCL **OR** diphenhydrAMINE    diphenhydrAMINE-zinc acetate    hydrOXYzine HCL **OR** LORazepam    hydrOXYzine HCL    melatonin    methocarbamol    OLANZapine **OR** OLANZapine    OLANZapine **OR** OLANZapine    OLANZapine    polyethylene glycol    propranolol    traZODone       Subjective     Patient was visited on unit for continuing care; chart reviewed and discussed with multidisciplinary treatment team.  On approach, the patient was calm and cooperative. Denied any changes in mood, appetite, and energy level. No problem initiating and maintaining sleep.  Denied A/VH currently.  Denied SI/HI, intent or plan upon direct inquiry at this time.  Does note some anxiety from the banking situation.  However he reports that it is manageable    Patient continues to be visible in the milieu and interacts with staff and peers. No reports of aggression or self-injurious behavior on unit. No PRN medications used in the past 24 hours.    Patient accepted all offered medications and no adverse effects of medications noted or reported.    Objective    Current Mental Status Evaluation:  Mental Status Exam  Appearance: casually dressed, consistent with stated age  Motor: no psychomotor retardation, no gait abnormalities  Behavior: cooperative, answers questions appropriately  Speech: soft, normal rhythm  Mood: \"a little anxious\"  Affect: blunted  Thought Process: linear and goal-oriented  Thought Content: denies delusions or paranoia  Risk Potential: denies suicidal ideation, plan, or intent. Denies homicidal ideation  Perceptions: denies auditory hallucinations, denies visual hallucinations,   Sensorium: Oriented to person, place, time, and situation  Cognition: cognitive " ability appears intact but was not quantitatively tested  Consciousness: alert and awake  Attention: intact, able to focus without difficulty  Insight: limited  Judgement: limited            Vital signs in last 24 hours:    Temp:  [97.5 °F (36.4 °C)] 97.5 °F (36.4 °C)  HR:  [70-75] 70  BP: (106-118)/(64-70) 118/70  Resp:  [18] 18  SpO2:  [96 %-100 %] 100 %  O2 Device: None (Room air)    Psychiatric Review of Systems:  Medication adverse effects: none  Sleep: unchanged  Appetite: unchanged  Behaviors over the past 24 hours: unchanged    Laboratory results:    I have personally reviewed all pertinent laboratory/tests results  No results found for this or any previous visit (from the past 48 hours).       Progress Toward Goals & Illness Status:   progressing, participates in milieu therapy, mood is stabilizing, placement pending    Patient is not at goal. They are not yet ready for discharge. The patient's condition currently requires active psychopharmacological medication management, interdisciplinary coordination with case management, and the utilization of adjunctive milieu and group therapy to augment psychopharmacological efficacy. The patient's risk of morbidity, and progression or decompensation of psychiatric disease, is higher without this current treatment.     Next of Kin  Extended Emergency Contact Information  Primary Emergency Contact: Lois Roberson  Address: [address] [place] [address]  Home Phone: [phone]  Relation: Mother    Counseling / Coordination of Care  Patient's progress discussed with staff in treatment team meeting.  Medications, treatment progress and treatment plan reviewed with patient.  Medication education provided to patient.  Educated on importance of medication and treatment compliance.  Supportive therapy provided to patient.  Cognitive techniques utilized during the session.  Reassurance and supportive therapy  provided.  Reoriented to reality and reassured.  Encouraged participation in milieu and group therapy on the unit.  Crisis/safety plan discussed with patient.       Dustin Mcallister MD  Attending Psychiatrist   [facility]      This note has been constructed using a voice recognition system. There may be translation, syntax, or grammatical errors. If you have any questions, please contact the dictating provider.

## 2025-02-19 NOTE — PROGRESS NOTES
02/19/25 0837   Team Meeting   Meeting Type Daily Rounds   Team Members Present   Team Members Present Physician;Nurse;   Physician Team Member Ary   [facility]ursing Team Member [facility]   Care Management Team Member Noa   Patient/Family Present   Patient Present No   Patient's Family Present No     Pt is medication and meal compliant. Pt denies SI/HI/AVH. Pt is calm and cooperative. Pt's discharge is pending placement at a personal care home.

## 2025-02-19 NOTE — NURSING NOTE
Patient has been seclusive to his room as of this time. Pleasant, calm and cooperative. Reports sleeping well last night. Compliant with medications. Waiting placement.

## 2025-02-19 NOTE — PLAN OF CARE
Pt intermittently attending groups. Pt does appear less visible in groups lately, but will attend some. Pt typically quiet, but participates appropriately with prompted.

## 2025-02-20 PROCEDURE — 99232 SBSQ HOSP IP/OBS MODERATE 35: CPT | Performed by: PSYCHIATRY & NEUROLOGY

## 2025-02-20 RX ADMIN — FAMOTIDINE 20 MG: 20 TABLET ORAL at 17:46

## 2025-02-20 RX ADMIN — CYANOCOBALAMIN TAB 1000 MCG 1000 MCG: 1000 TAB at 08:25

## 2025-02-20 RX ADMIN — Medication 2500 UNITS: at 08:25

## 2025-02-20 RX ADMIN — SENNOSIDES AND DOCUSATE SODIUM 2 TABLET: 50; 8.6 TABLET ORAL at 17:46

## 2025-02-20 RX ADMIN — FAMOTIDINE 20 MG: 20 TABLET ORAL at 08:25

## 2025-02-20 RX ADMIN — SERTRALINE HYDROCHLORIDE 150 MG: 100 TABLET ORAL at 08:25

## 2025-02-20 RX ADMIN — ATORVASTATIN CALCIUM 40 MG: 40 TABLET, FILM COATED ORAL at 17:46

## 2025-02-20 RX ADMIN — LEVOTHYROXINE SODIUM 37.5 MCG: 0.07 TABLET ORAL at 06:47

## 2025-02-20 RX ADMIN — ARIPIPRAZOLE 5 MG: 5 TABLET ORAL at 08:25

## 2025-02-20 NOTE — PROGRESS NOTES
Progress Note - Behavioral Health   Name: Franco Roberson 39 y.o. male I MRN: [id number]  Unit/Bed#: -01 I Date of Admission: 5/14/2024   Date of Service: 2/20/2025 I Hospital Day: 282     Assessment & Plan  MDD (major depressive disorder), recurrent severe, without psychosis (HCC)  Patient continues to do well and the plan is to continue with the current treatment plan as mentioned below with no changes.    Patient continues to be pending group home placement. Otherwise no clinical significant change.   continues to look for group home/personal-care home placement however this can be significantly impacted by patient no longer having money in the [employer] account and his bank account being over drawn.  Patient is quite altruistic in his desire not to have his friend go to retirement however continues to display poor to limited insight regarding his ability to advocate for himself and organize/prioritize his own needs given his own difficult social situation. Would be at significant risk of harm if not discharged to a structured setting with support.    Medication regimen as follows, no changes as of 2/20/2025:  Abilify 5 mg daily as mood adjunct   Zoloft 150 mg daily for depression and anxiety  Continue to encourage participation in group therapy, milieu therapy and occupational therapy.  Continue to assess for side effects of medications.  Continue collaboration with PATRICIA for medical co-morbidities as indicated.  Continue discussion with CM/SW to assist with obtaining collateral, disposition planning, and the implementation of patient-centered individualized plan of care.  Continue frequent safety checks and vitals per unit protocol.    Risks, benefits and possible side effects of Medications: Risks, benefits, and possible side effects of medications have previously been explained. No new medications at this time.      Legal status: 201    Disposition: to be determined, pending SOAR application for potential  "group home placement. OT Cognitive Evaluation completed: \"Pt would benefit from discharge to a supportive environment that can provide checks for safety and compliance with IADLs. \"   Although patient's mood has stabilized, they are currently awaiting group home placement.  Their ability to recognize safety hazards take medications and participate in IADLs are significantly impaired by their cognitive deficits compounded by their chronic mental health needs and would be at risk for significant decompensation without the structure and support of a group home setting.      No associated orders from this encounter found during lookback period of 72 hours.  Autism spectrum disorder  Continue supportive care    No associated orders from this encounter found during lookback period of 72 hours.    Hyperlipidemia  - Lipitor 40 mg       Plan     Recommended Treatment:    - Encourage early mobility and having a structured day  - Provide frequent re-orientation, and cognitive stimulation  - Ensure assistive devices are in proper working order (eye-glasses, hearing aids)  - Encourage adequate hydration, nutrition and monitor bowel movements  - Maintain sleep-wake cycle: Uninterrupted sleep time; low-level lighting at night  - Fall precaution  - f/u SLIM recs regarding the medical problems   - Continue medication titration and treatment plan; adjust medication to optimize treatment response and as clinically indicated. .   - Observation: routine            - VS: as per unit protocol  - Diet: Regular diet  - Encourage group attendance and milieu therapy  - Dispo: To be determined     Scheduled medications:  Current Facility-Administered Medications   Medication Dose Route Frequency Provider Last Rate    acetaminophen  650 mg Oral Q6H PRN Basilio Brown MD      acetaminophen  650 mg Oral Q4H PRN Basilio Brown MD      acetaminophen  975 mg Oral Q6H PRN Basilio Brown MD      aluminum-magnesium hydroxide-simethicone  " 30 mL Oral Q4H PRN Basilio Brown MD      ARIPiprazole  5 mg Oral Daily Basilio Panda MD      Artificial Tears  1 drop Both Eyes Q3H PRN Basilio Brown MD      atorvastatin  40 mg Oral Daily With Dinner WALLY Baptiste      benztropine  1 mg Intramuscular Q4H PRN Max 6/day Basilio Brown MD      benztropine  1 mg Oral Q4H PRN Max 6/day Basilio Brown MD      Cholecalciferol  2,500 Units Oral Daily Dustin Mcallister MD      cyanocobalamin  1,000 mcg Oral Daily WALLY Baptiste      Diclofenac Sodium  2 g Topical 4x Daily PRN WALLY Baptiste      hydrOXYzine HCL  50 mg Oral Q6H PRN Max 4/day Basilio Brown MD      Or    diphenhydrAMINE  50 mg Intramuscular Q6H PRN Basilio Brown MD      diphenhydrAMINE-zinc acetate   Topical Daily PRN Raj Guerrero MD      famotidine  20 mg Oral BID WALLY Baptiste      hydrOXYzine HCL  100 mg Oral Q6H PRN Max 4/day Basilio Brown MD      Or    LORazepam  2 mg Intramuscular Q6H PRN Basilio Brown MD      hydrOXYzine HCL  25 mg Oral Q6H PRN Max 4/day Basilio Brown MD      levothyroxine  37.5 mcg Oral Early Morning WALLY Baptiste      melatonin  6 mg Oral HS PRN WALLY Gomez      methocarbamol  500 mg Oral Q6H PRN WALLY Baptiste      OLANZapine  5 mg Oral Q4H PRN Max 3/day Basilio Brown MD      Or    OLANZapine  2.5 mg Intramuscular Q4H PRN Max 3/day Basilio Brown MD      OLANZapine  5 mg Oral Q3H PRN Max 3/day Basilio Brown MD      Or    OLANZapine  5 mg Intramuscular Q3H PRN Max 3/day Basilio Brown MD      OLANZapine  2.5 mg Oral Q4H PRN Max 6/day Basilio Brown MD      polyethylene glycol  17 g Oral Daily PRN Basilio Brown MD      propranolol  10 mg Oral Q8H PRN Basilio Brown MD      senna-docusate sodium  2 tablet Oral After Dinner Dustin Mcallister MD      sertraline  150 mg Oral Daily Rachana Borrego  "WALLY Higgins      traZODone  50 mg Oral HS PRN Basilio Brown MD        PRN:    acetaminophen    acetaminophen    acetaminophen    aluminum-magnesium hydroxide-simethicone    Artificial Tears    benztropine    benztropine    Diclofenac Sodium    hydrOXYzine HCL **OR** diphenhydrAMINE    diphenhydrAMINE-zinc acetate    hydrOXYzine HCL **OR** LORazepam    hydrOXYzine HCL    melatonin    methocarbamol    OLANZapine **OR** OLANZapine    OLANZapine **OR** OLANZapine    OLANZapine    polyethylene glycol    propranolol    traZODone       Subjective     Patient was visited on unit for continuing care; chart reviewed and discussed with multidisciplinary treatment team.  On approach, the patient was calm and cooperative. Denied any changes in mood, appetite, and energy level. No problem initiating and maintaining sleep.  Denied A/VH currently.  Denied SI/HI, intent or plan upon direct inquiry at this time.    Patient continues to be visible in the milieu and interacts with staff and peers. No reports of aggression or self-injurious behavior on unit. No PRN medications used in the past 24 hours.    Patient accepted all offered medications and no adverse effects of medications noted or reported.    Objective    Current Mental Status Evaluation:  Mental Status Exam  Appearance: casually dressed, consistent with stated age  Motor: no psychomotor retardation, no gait abnormalities  Behavior: cooperative, answers questions appropriately  Speech: soft, normal rhythm  Mood: \"good\"  Affect: blunted  Thought Process: concrete  Thought Content: denies delusions  Risk Potential: denies suicidal ideation, plan, or intent. Denies homicidal ideation  Perceptions: denies auditory hallucinations, denies visual hallucinations,   Sensorium: Oriented to person, place, time, and situation  Cognition: cognitive ability appears intact but was not quantitatively tested  Consciousness: alert and awake  Attention: intact, able to focus without " difficulty  Insight: limited  Judgement: limited            Vital signs in last 24 hours:    Temp:  [97.3 °F (36.3 °C)-97.5 °F (36.4 °C)] 97.5 °F (36.4 °C)  HR:  [57-84] 57  BP: (120-123)/(60-77) 120/77  Resp:  [16] 16  SpO2:  [97 %-98 %] 97 %  O2 Device: None (Room air)    Psychiatric Review of Systems:  Medication adverse effects: none  Sleep: unchanged  Appetite: unchanged  Behaviors over the past 24 hours: unchanged    Laboratory results:    I have personally reviewed all pertinent laboratory/tests results  No results found for this or any previous visit (from the past 48 hours).       Progress Toward Goals & Illness Status:   progressing, attends groups, mood is stabilizing, depression is improving, placement pending    Patient is not at goal. They are not yet ready for discharge. The patient's condition currently requires active psychopharmacological medication management, interdisciplinary coordination with case management, and the utilization of adjunctive milieu and group therapy to augment psychopharmacological efficacy. The patient's risk of morbidity, and progression or decompensation of psychiatric disease, is higher without this current treatment.     Next of Kin  Extended Emergency Contact Information  Primary Emergency Contact: Lois Roberson  Address: [address] [place] [address]  Home Phone: [phone]  Relation: Mother    Counseling / Coordination of Care  Patient's progress discussed with staff in treatment team meeting.  Medications, treatment progress and treatment plan reviewed with patient.  Medication education provided to patient.  Educated on importance of medication and treatment compliance.  Supportive therapy provided to patient.  Cognitive techniques utilized during the session.  Reassurance and supportive therapy provided.  Reoriented to reality and reassured.  Encouraged participation in milieu and group therapy on the  unit.  Crisis/safety plan discussed with patient.       Dustin Mcallister MD  Attending Psychiatrist   [facility]      This note has been constructed using a voice recognition system. There may be translation, syntax, or grammatical errors. If you have any questions, please contact the dictating provider.

## 2025-02-20 NOTE — ASSESSMENT & PLAN NOTE
"Patient continues to do well and the plan is to continue with the current treatment plan as mentioned below with no changes.    Patient continues to be pending group home placement. Otherwise no clinical significant change.   continues to look for group home/personal-care home placement however this can be significantly impacted by patient no longer having money in the [employer] account and his bank account being over drawn.  Patient is quite altruistic in his desire not to have his friend go to [facility] however continues to display poor to limited insight regarding his ability to advocate for himself and organize/prioritize his own needs given his own difficult social situation. Would be at significant risk of harm if not discharged to a structured setting with support.    Medication regimen as follows, no changes as of 2/20/2025:  Abilify 5 mg daily as mood adjunct   Zoloft 150 mg daily for depression and anxiety  Continue to encourage participation in group therapy, milieu therapy and occupational therapy.  Continue to assess for side effects of medications.  Continue collaboration with SLIM for medical co-morbidities as indicated.  Continue discussion with CM/SW to assist with obtaining collateral, disposition planning, and the implementation of patient-centered individualized plan of care.  Continue frequent safety checks and vitals per unit protocol.    Risks, benefits and possible side effects of Medications: Risks, benefits, and possible side effects of medications have previously been explained. No new medications at this time.      Legal status: 201    Disposition: to be determined, pending SOAR application for potential group home placement. OT Cognitive Evaluation completed: \"Pt would benefit from discharge to a supportive environment that can provide checks for safety and compliance with IADLs. \"   Although patient's mood has stabilized, they are currently awaiting group home placement.  Their " ability to recognize safety hazards take medications and participate in IADLs are significantly impaired by their cognitive deficits compounded by their chronic mental health needs and would be at risk for significant decompensation without the structure and support of a group home setting.      No associated orders from this encounter found during lookback period of 72 hours.

## 2025-02-20 NOTE — PLAN OF CARE
Problem: Depression  Goal: Treatment Goal: Demonstrate behavioral control of depressive symptoms, verbalize feelings of improved mood/affect, and adopt new coping skills prior to discharge  Outcome: Progressing  Goal: Verbalize thoughts and feelings  Description: Interventions:  - Assess and re-assess patient's level of risk   - Engage patient in 1:1 interactions, daily, for a minimum of 15 minutes   - Encourage patient to express feelings, fears, frustrations, hopes   Outcome: Progressing  Goal: Refrain from harming self  Description: Interventions:  - Monitor patient closely, per order   - Supervise medication ingestion, monitor effects and side effects   Outcome: Progressing     Problem: Anxiety  Goal: Anxiety is at manageable level  Description: Interventions:  - Assess and monitor patient's anxiety level.   - Monitor for signs and symptoms (heart palpitations, chest pain, shortness of breath, headaches, nausea, feeling jumpy, restlessness, irritable, apprehensive).   - Collaborate with interdisciplinary team and initiate plan and interventions as ordered.  - [place] patient to unit/surroundings  - Explain treatment plan  - Encourage participation in care  - Encourage verbalization of concerns/fears  - Identify coping mechanisms  - Assist in developing anxiety-reducing skills  - Administer/offer alternative therapies  - Limit or eliminate stimulants  Outcome: Progressing     Problem: DISCHARGE PLANNING - CARE MANAGEMENT  Goal: Discharge to post-acute care or home with appropriate resources  Description: INTERVENTIONS:  - Conduct assessment to determine patient/family and health care team treatment goals, and need for post-acute services based on payer coverage, community resources, and patient preferences, and barriers to discharge  - Address psychosocial, clinical, and financial barriers to discharge as identified in assessment in conjunction with the patient/family and health care team  - Arrange appropriate  level of post-acute services according to patient’s   needs and preference and payer coverage in collaboration with the physician and health care team  - Communicate with and update the patient/family, physician, and health care team regarding progress on the discharge plan  - Arrange appropriate transportation to post-acute venues  Outcome: Progressing     Problem: Knowledge Deficit  Goal: Patient/family/caregiver demonstrates understanding of disease process, treatment plan, medications, and discharge instructions  Description: Complete learning assessment and assess knowledge base.  Interventions:  - Provide teaching at level of understanding  - Provide teaching via preferred learning methods  Outcome: Progressing     Problem: SLEEP DISTURBANCE  Goal: Will exhibit normal sleeping pattern  Description: Interventions:  -  Assess the patients sleep pattern, noting recent changes  - Administer medication as ordered  - Decrease environmental stimuli, including noise, as appropriate during the night  - Encourage the patient to actively participate in unit groups and or exercise during the day to enhance ability to achieve adequate sleep at night  - Assess the patient, in the morning, encouraging a description of sleep experience  Outcome: Progressing     Problem: Depression  Goal: Refrain from isolation  Description: Interventions:  - Develop a trusting relationship   - Encourage socialization   Outcome: Not Progressing

## 2025-02-20 NOTE — PROGRESS NOTES
02/20/25 0838   Team Meeting   Meeting Type Daily Rounds   Team Members Present   Team Members Present Physician;Nurse;   Physician Team Member Ary   [facility]   Patient/Family Present   Patient Present No   Patient's Family Present No     Pt is medication and meal compliant. Pt denies SI/HI/AVH. Pt is calm and cooperative. Pt's discharge is pending placement.

## 2025-02-20 NOTE — NURSING NOTE
Patient has been seclusive to his room. Only out for his breakfast tray. Pleasant, calm and cooperative. Denies symptoms. Waiting placement.

## 2025-02-20 NOTE — NURSING NOTE
Patient is mainly isolative to room, but makes needs known. Patient did not express any unmet needs. Patient denies all psych symptoms. Patient compliant with scheduled medications. Q15 observation maintained.

## 2025-02-21 PROCEDURE — 99232 SBSQ HOSP IP/OBS MODERATE 35: CPT | Performed by: PSYCHIATRY & NEUROLOGY

## 2025-02-21 RX ADMIN — LEVOTHYROXINE SODIUM 37.5 MCG: 0.07 TABLET ORAL at 06:39

## 2025-02-21 RX ADMIN — Medication 2500 UNITS: at 08:23

## 2025-02-21 RX ADMIN — FAMOTIDINE 20 MG: 20 TABLET ORAL at 17:41

## 2025-02-21 RX ADMIN — FAMOTIDINE 20 MG: 20 TABLET ORAL at 08:23

## 2025-02-21 RX ADMIN — ATORVASTATIN CALCIUM 40 MG: 40 TABLET, FILM COATED ORAL at 17:41

## 2025-02-21 RX ADMIN — ARIPIPRAZOLE 5 MG: 5 TABLET ORAL at 08:23

## 2025-02-21 RX ADMIN — CYANOCOBALAMIN TAB 1000 MCG 1000 MCG: 1000 TAB at 08:23

## 2025-02-21 RX ADMIN — SENNOSIDES AND DOCUSATE SODIUM 2 TABLET: 50; 8.6 TABLET ORAL at 17:41

## 2025-02-21 RX ADMIN — SERTRALINE HYDROCHLORIDE 150 MG: 100 TABLET ORAL at 08:23

## 2025-02-21 NOTE — NURSING NOTE
Seclusive to his room as of this time. Pleasant, calm and cooperative. Denies symptoms. Compliant. Waiting placement.

## 2025-02-21 NOTE — ASSESSMENT & PLAN NOTE
"Patient continues to do well and the plan is to continue with the current treatment plan as mentioned below with no changes.    Patient continues to be pending group home placement. Otherwise no clinical significant change.   continues to look for group home/personal-care home placement however this can be significantly impacted by patient no longer having money in the AB[employer] account and his bank account being over drawn.  Patient is quite altruistic in his desire not to have his friend go to custodial however continues to display poor to limited insight regarding his ability to advocate for himself and organize/prioritize his own needs given his own difficult social situation. Would be at significant risk of harm if not discharged to a structured setting with support.    Medication regimen as follows, no changes as of 2/21/2025:  Abilify 5 mg daily as mood adjunct   Zoloft 150 mg daily for depression and anxiety  Continue to encourage participation in group therapy, milieu therapy and occupational therapy.  Continue to assess for side effects of medications.  Continue collaboration with SLIM for medical co-morbidities as indicated.  Continue discussion with CM/SW to assist with obtaining collateral, disposition planning, and the implementation of patient-centered individualized plan of care.  Continue frequent safety checks and vitals per unit protocol.    Risks, benefits and possible side effects of Medications: Risks, benefits, and possible side effects of medications have previously been explained. No new medications at this time.      Legal status: 201    Disposition: to be determined, pending SOAR application for potential group home placement. OT Cognitive Evaluation completed: \"Pt would benefit from discharge to a supportive environment that can provide checks for safety and compliance with IADLs. \"   Although patient's mood has stabilized, they are currently awaiting group home placement.  Their " ability to recognize safety hazards take medications and participate in IADLs are significantly impaired by their cognitive deficits compounded by their chronic mental health needs and would be at risk for significant decompensation without the structure and support of a group home setting.      No associated orders from this encounter found during lookback period of 72 hours.

## 2025-02-21 NOTE — PLAN OF CARE
Problem: Depression  Goal: Treatment Goal: Demonstrate behavioral control of depressive symptoms, verbalize feelings of improved mood/affect, and adopt new coping skills prior to discharge  Outcome: Progressing  Goal: Verbalize thoughts and feelings  Description: Interventions:  - Assess and re-assess patient's level of risk   - Engage patient in 1:1 interactions, daily, for a minimum of 15 minutes   - Encourage patient to express feelings, fears, frustrations, hopes   Outcome: Progressing  Goal: Refrain from harming self  Description: Interventions:  - Monitor patient closely, per order   - Supervise medication ingestion, monitor effects and side effects   Outcome: Progressing     Problem: Anxiety  Goal: Anxiety is at manageable level  Description: Interventions:  - Assess and monitor patient's anxiety level.   - Monitor for signs and symptoms (heart palpitations, chest pain, shortness of breath, headaches, nausea, feeling jumpy, restlessness, irritable, apprehensive).   - Collaborate with interdisciplinary team and initiate plan and interventions as ordered.  - [place] patient to unit/surroundings  - Explain treatment plan  - Encourage participation in care  - Encourage verbalization of concerns/fears  - Identify coping mechanisms  - Assist in developing anxiety-reducing skills  - Administer/offer alternative therapies  - Limit or eliminate stimulants  Outcome: Progressing     Problem: DISCHARGE PLANNING - CARE MANAGEMENT  Goal: Discharge to post-acute care or home with appropriate resources  Description: INTERVENTIONS:  - Conduct assessment to determine patient/family and health care team treatment goals, and need for post-acute services based on payer coverage, community resources, and patient preferences, and barriers to discharge  - Address psychosocial, clinical, and financial barriers to discharge as identified in assessment in conjunction with the patient/family and health care team  - Arrange appropriate  level of post-acute services according to patient’s   needs and preference and payer coverage in collaboration with the physician and health care team  - Communicate with and update the patient/family, physician, and health care team regarding progress on the discharge plan  - Arrange appropriate transportation to post-acute venues  Outcome: Progressing     Problem: Knowledge Deficit  Goal: Patient/family/caregiver demonstrates understanding of disease process, treatment plan, medications, and discharge instructions  Description: Complete learning assessment and assess knowledge base.  Interventions:  - Provide teaching at level of understanding  - Provide teaching via preferred learning methods  Outcome: Progressing     Problem: SLEEP DISTURBANCE  Goal: Will exhibit normal sleeping pattern  Description: Interventions:  -  Assess the patients sleep pattern, noting recent changes  - Administer medication as ordered  - Decrease environmental stimuli, including noise, as appropriate during the night  - Encourage the patient to actively participate in unit groups and or exercise during the day to enhance ability to achieve adequate sleep at night  - Assess the patient, in the morning, encouraging a description of sleep experience  Outcome: Progressing     Problem: Depression  Goal: Refrain from isolation  Description: Interventions:  - Develop a trusting relationship   - Encourage socialization   Outcome: Not Progressing

## 2025-02-21 NOTE — PROGRESS NOTES
02/21/25 0838   Team Meeting   Meeting Type Daily Rounds   Team Members Present   Team Members Present Physician;Nurse;   Physician Team Member Ary   [facility] Suhas   Patient/Family Present   Patient Present No   Patient's Family Present No     Pt med/meal compliant. Visible on the unit. Pleasant, calm, cooperative. Discharge pending placement.

## 2025-02-21 NOTE — PROGRESS NOTES
02/07/25 1534   Team Meeting   Meeting Type Tx Team Meeting   Team Members Present   Team Members Present Physician;Nurse;   Physician Team Member ramin   [facility]   Care Management Team Member gisela   Patient/Family Present   Patient Present Yes   Patient's Family Present No     Tx plan was reviewed and discussed with Pt. Pt was encouraged to attend groups. Medication was discussed with Pt. Pt signed tx plan.

## 2025-02-21 NOTE — PROGRESS NOTES
Progress Note - Behavioral Health   Name: Franco Roberson 39 y.o. male I MRN: [id number]  Unit/Bed#: -01 I Date of Admission: 5/14/2024   Date of Service: 2/21/2025 I Hospital Day: 283     Assessment & Plan  MDD (major depressive disorder), recurrent severe, without psychosis (HCC)  Patient continues to do well and the plan is to continue with the current treatment plan as mentioned below with no changes.    Patient continues to be pending group home placement. Otherwise no clinical significant change.   continues to look for group home/personal-care home placement however this can be significantly impacted by patient no longer having money in the [employer] account and his bank account being over drawn.  Patient is quite altruistic in his desire not to have his friend go to assisted however continues to display poor to limited insight regarding his ability to advocate for himself and organize/prioritize his own needs given his own difficult social situation. Would be at significant risk of harm if not discharged to a structured setting with support.    Medication regimen as follows, no changes as of 2/21/2025:  Abilify 5 mg daily as mood adjunct   Zoloft 150 mg daily for depression and anxiety  Continue to encourage participation in group therapy, milieu therapy and occupational therapy.  Continue to assess for side effects of medications.  Continue collaboration with PATRICIA for medical co-morbidities as indicated.  Continue discussion with CM/SW to assist with obtaining collateral, disposition planning, and the implementation of patient-centered individualized plan of care.  Continue frequent safety checks and vitals per unit protocol.    Risks, benefits and possible side effects of Medications: Risks, benefits, and possible side effects of medications have previously been explained. No new medications at this time.      Legal status: 201    Disposition: to be determined, pending SOAR application for potential  "group home placement. OT Cognitive Evaluation completed: \"Pt would benefit from discharge to a supportive environment that can provide checks for safety and compliance with IADLs. \"   Although patient's mood has stabilized, they are currently awaiting group home placement.  Their ability to recognize safety hazards take medications and participate in IADLs are significantly impaired by their cognitive deficits compounded by their chronic mental health needs and would be at risk for significant decompensation without the structure and support of a group home setting.      No associated orders from this encounter found during lookback period of 72 hours.  Autism spectrum disorder  Continue supportive care    No associated orders from this encounter found during lookback period of 72 hours.    Hyperlipidemia  - Lipitor 40 mg       Plan     Recommended Treatment:    - Encourage early mobility and having a structured day  - Provide frequent re-orientation, and cognitive stimulation  - Ensure assistive devices are in proper working order (eye-glasses, hearing aids)  - Encourage adequate hydration, nutrition and monitor bowel movements  - Maintain sleep-wake cycle: Uninterrupted sleep time; low-level lighting at night  - Fall precaution  - f/u SLIM recs regarding the medical problems   - Continue medication titration and treatment plan; adjust medication to optimize treatment response and as clinically indicated. .   - Observation: routine            - VS: as per unit protocol  - Diet: Regular diet  - Encourage group attendance and milieu therapy  - Dispo: To be determined     Scheduled medications:  Current Facility-Administered Medications   Medication Dose Route Frequency Provider Last Rate    acetaminophen  650 mg Oral Q6H PRN Basilio Brown MD      acetaminophen  650 mg Oral Q4H PRN Basilio Brown MD      acetaminophen  975 mg Oral Q6H PRN Basilio Brown MD      aluminum-magnesium hydroxide-simethicone  " 30 mL Oral Q4H PRN Basilio Brown MD      ARIPiprazole  5 mg Oral Daily Basilio Panda MD      Artificial Tears  1 drop Both Eyes Q3H PRN Basilio Brown MD      atorvastatin  40 mg Oral Daily With Dinner WALLY Baptiste      benztropine  1 mg Intramuscular Q4H PRN Max 6/day Basilio Brown MD      benztropine  1 mg Oral Q4H PRN Max 6/day Basilio Brown MD      Cholecalciferol  2,500 Units Oral Daily Dustin Mcallister MD      cyanocobalamin  1,000 mcg Oral Daily WALLY Baptiste      Diclofenac Sodium  2 g Topical 4x Daily PRN WALLY Baptiste      hydrOXYzine HCL  50 mg Oral Q6H PRN Max 4/day Basilio Brown MD      Or    diphenhydrAMINE  50 mg Intramuscular Q6H PRN Basilio Brown MD      diphenhydrAMINE-zinc acetate   Topical Daily PRN Raj Guerrero MD      famotidine  20 mg Oral BID WALLY Baptiste      hydrOXYzine HCL  100 mg Oral Q6H PRN Max 4/day Basilio Brown MD      Or    LORazepam  2 mg Intramuscular Q6H PRN Basilio Brown MD      hydrOXYzine HCL  25 mg Oral Q6H PRN Max 4/day Basilio Brown MD      levothyroxine  37.5 mcg Oral Early Morning WALLY Baptiste      melatonin  6 mg Oral HS PRN WALLY Gomez      methocarbamol  500 mg Oral Q6H PRN WALLY Baptiste      OLANZapine  5 mg Oral Q4H PRN Max 3/day Basilio Brown MD      Or    OLANZapine  2.5 mg Intramuscular Q4H PRN Max 3/day Basilio Brown MD      OLANZapine  5 mg Oral Q3H PRN Max 3/day Basilio Brown MD      Or    OLANZapine  5 mg Intramuscular Q3H PRN Max 3/day Basilio Brown MD      OLANZapine  2.5 mg Oral Q4H PRN Max 6/day Basilio Brown MD      polyethylene glycol  17 g Oral Daily PRN Basilio Brown MD      propranolol  10 mg Oral Q8H PRN Basilio Brown MD      senna-docusate sodium  2 tablet Oral After Dinner Dustin Mcallister MD      sertraline  150 mg Oral Daily Rachana Borrego  "WALLY Higgins      traZODone  50 mg Oral HS PRN Basilio Brown MD        PRN:    acetaminophen    acetaminophen    acetaminophen    aluminum-magnesium hydroxide-simethicone    Artificial Tears    benztropine    benztropine    Diclofenac Sodium    hydrOXYzine HCL **OR** diphenhydrAMINE    diphenhydrAMINE-zinc acetate    hydrOXYzine HCL **OR** LORazepam    hydrOXYzine HCL    melatonin    methocarbamol    OLANZapine **OR** OLANZapine    OLANZapine **OR** OLANZapine    OLANZapine    polyethylene glycol    propranolol    traZODone       Subjective     Patient was visited on unit for continuing care; chart reviewed and discussed with multidisciplinary treatment team.  On approach, the patient was calm and cooperative. Denied any changes in mood, appetite, and energy level. No problem initiating and maintaining sleep.  Denied A/VH currently.  Denied SI/HI, intent or plan upon direct inquiry at this time.    Patient continues to be visible in the milieu and interacts with staff and peers. No reports of aggression or self-injurious behavior on unit. No PRN medications used in the past 24 hours.    Patient accepted all offered medications and no adverse effects of medications noted or reported.    Objective    Current Mental Status Evaluation:  Mental Status Exam  Appearance: casually dressed, consistent with stated age  Motor: no psychomotor retardation, no gait abnormalities  Behavior: cooperative, answers questions appropriately  Speech: soft, normal rhythm  Mood: \"ok\"  Affect: blunted  Thought Process: concrete  Thought Content: denies delusions  Risk Potential: denies suicidal ideation, plan, or intent. Denies homicidal ideation  Perceptions: denies auditory hallucinations, denies visual hallucinations,   Sensorium: Oriented to person, place, time, and situation  Cognition: cognitive ability appears intact but was not quantitatively tested  Consciousness: alert and awake  Attention: intact, able to focus without " difficulty  Insight: limited  Judgement: limited          Vital signs in last 24 hours:    Temp:  [97 °F (36.1 °C)-97.4 °F (36.3 °C)] 97 °F (36.1 °C)  HR:  [67-73] 67  BP: (104-113)/(62) 113/62  Resp:  [16] 16  SpO2:  [95 %-98 %] 98 %  O2 Device: None (Room air)    Psychiatric Review of Systems:  Medication adverse effects: none  Sleep: unchanged  Appetite: unchanged  Behaviors over the past 24 hours: unchanged    Laboratory results:    I have personally reviewed all pertinent laboratory/tests results  No results found for this or any previous visit (from the past 48 hours).       Progress Toward Goals & Illness Status:   attends groups, participates in milieu therapy, mood is stabilizing, placement pending    Patient is not at goal. They are not yet ready for discharge. The patient's condition currently requires active psychopharmacological medication management, interdisciplinary coordination with case management, and the utilization of adjunctive milieu and group therapy to augment psychopharmacological efficacy. The patient's risk of morbidity, and progression or decompensation of psychiatric disease, is higher without this current treatment.     Next of Kin  Extended Emergency Contact Information  Primary Emergency Contact: Lois Roberson  Address: [address] [place] [address] of Health system  Home Phone: [phone]  Relation: Mother    Counseling / Coordination of Care  Patient's progress discussed with staff in treatment team meeting.  Medications, treatment progress and treatment plan reviewed with patient.  Medication education provided to patient.  Educated on importance of medication and treatment compliance.  Supportive therapy provided to patient.  Cognitive techniques utilized during the session.  Reassurance and supportive therapy provided.  Reoriented to reality and reassured.  Encouraged participation in milieu and group therapy on the unit.  Crisis/safety  plan discussed with patient.       Dustin Mcallister MD  Attending Psychiatrist   [facility]      This note has been constructed using a voice recognition system. There may be translation, syntax, or grammatical errors. If you have any questions, please contact the dictating provider.

## 2025-02-21 NOTE — NURSING NOTE
Patient secluded to room this shift,appears to be depressed.This nurse encouraged patient to partake in group and social activities,with no success. Denies depression SI/HI/AH/VH at this time.

## 2025-02-22 PROCEDURE — 99232 SBSQ HOSP IP/OBS MODERATE 35: CPT | Performed by: PSYCHIATRY & NEUROLOGY

## 2025-02-22 RX ADMIN — SERTRALINE HYDROCHLORIDE 150 MG: 100 TABLET ORAL at 08:15

## 2025-02-22 RX ADMIN — LEVOTHYROXINE SODIUM 37.5 MCG: 0.07 TABLET ORAL at 06:16

## 2025-02-22 RX ADMIN — FAMOTIDINE 20 MG: 20 TABLET ORAL at 08:15

## 2025-02-22 RX ADMIN — FAMOTIDINE 20 MG: 20 TABLET ORAL at 17:16

## 2025-02-22 RX ADMIN — ARIPIPRAZOLE 5 MG: 5 TABLET ORAL at 08:15

## 2025-02-22 RX ADMIN — SENNOSIDES AND DOCUSATE SODIUM 2 TABLET: 50; 8.6 TABLET ORAL at 17:17

## 2025-02-22 RX ADMIN — CYANOCOBALAMIN TAB 1000 MCG 1000 MCG: 1000 TAB at 08:15

## 2025-02-22 RX ADMIN — ATORVASTATIN CALCIUM 40 MG: 40 TABLET, FILM COATED ORAL at 17:16

## 2025-02-22 RX ADMIN — Medication 2500 UNITS: at 08:15

## 2025-02-22 NOTE — NURSING NOTE
Pt withdrawn to self and room. Medication and meal compliant. Encouraged to attend groups but pt typically takes the weekends off of groups. Denies SI/HI/AH/VH at this time. Remains hopeful for discharge. Denies any unmet needs or complaints.

## 2025-02-22 NOTE — PROGRESS NOTES
Progress Note - Behavioral Health   Name: Franco Roberson 39 y.o. male I MRN: [id number]  Unit/Bed#: -01 I Date of Admission: 5/14/2024   Date of Service: 2/22/2025 I Hospital Day: 284     Assessment & Plan  MDD (major depressive disorder), recurrent severe, without psychosis (HCC)  Patient continues to do well and the plan is to continue with the current treatment plan as mentioned below with no changes.    Patient continues to be pending group home placement. Otherwise no clinical significant change.   continues to look for group home/personal-care home placement however this can be significantly impacted by patient no longer having money in the [employer] account and his bank account being over drawn.  Patient is quite altruistic in his desire not to have his friend go to [facility] however continues to display poor to limited insight regarding his ability to advocate for himself and organize/prioritize his own needs given his own difficult social situation. Would be at significant risk of harm if not discharged to a structured setting with support.    Medication regimen as follows, no changes as of 2/22/2025:  Abilify 5 mg daily as mood adjunct   Zoloft 150 mg daily for depression and anxiety  Continue to encourage participation in group therapy, milieu therapy and occupational therapy.  Continue to assess for side effects of medications.  Continue collaboration with PATRICIA for medical co-morbidities as indicated.  Continue discussion with CM/SW to assist with obtaining collateral, disposition planning, and the implementation of patient-centered individualized plan of care.  Continue frequent safety checks and vitals per unit protocol.    Risks, benefits and possible side effects of Medications: Risks, benefits, and possible side effects of medications have previously been explained. No new medications at this time.      Legal status: 201    Disposition: to be determined, pending SOAR application for potential  "group home placement. OT Cognitive Evaluation completed: \"Pt would benefit from discharge to a supportive environment that can provide checks for safety and compliance with IADLs. \"   Although patient's mood has stabilized, they are currently awaiting group home placement.  Their ability to recognize safety hazards take medications and participate in IADLs are significantly impaired by their cognitive deficits compounded by their chronic mental health needs and would be at risk for significant decompensation without the structure and support of a group home setting.      No associated orders from this encounter found during lookback period of 72 hours.  Autism spectrum disorder  Continue supportive care    No associated orders from this encounter found during lookback period of 72 hours.    Hyperlipidemia  - Lipitor 40 mg       Plan     Recommended Treatment:    - Encourage early mobility and having a structured day  - Provide frequent re-orientation, and cognitive stimulation  - Ensure assistive devices are in proper working order (eye-glasses, hearing aids)  - Encourage adequate hydration, nutrition and monitor bowel movements  - Maintain sleep-wake cycle: Uninterrupted sleep time; low-level lighting at night  - Fall precaution  - f/u [facility] recs regarding the medical problems   - Continue medication titration and treatment plan; adjust medication to optimize treatment response and as clinically indicated. .   - Observation: routine            - VS: as per unit protocol  - Diet: Regular diet  - Encourage group attendance and milieu therapy  - Dispo: To be determined     Scheduled medications:  Current Facility-Administered Medications   Medication Dose Route Frequency Provider Last Rate    acetaminophen  650 mg Oral Q6H PRN Basilio Brown MD      acetaminophen  650 mg Oral Q4H PRN Basilio Brown MD      acetaminophen  975 mg Oral Q6H PRN Basilio Brown MD      aluminum-magnesium hydroxide-simethicone  " 30 mL Oral Q4H PRN Basilio Brown MD      ARIPiprazole  5 mg Oral Daily Basilio Panda MD      Artificial Tears  1 drop Both Eyes Q3H PRN Basilio Brown MD      atorvastatin  40 mg Oral Daily With Dinner WALLY Baptiste      benztropine  1 mg Intramuscular Q4H PRN Max 6/day Basilio Brown MD      benztropine  1 mg Oral Q4H PRN Max 6/day Basilio Brown MD      Cholecalciferol  2,500 Units Oral Daily Dustin Mcallister MD      cyanocobalamin  1,000 mcg Oral Daily WALLY Baptiste      Diclofenac Sodium  2 g Topical 4x Daily PRN WALLY Baptiste      hydrOXYzine HCL  50 mg Oral Q6H PRN Max 4/day Basilio Brown MD      Or    diphenhydrAMINE  50 mg Intramuscular Q6H PRN Basilio Brown MD      diphenhydrAMINE-zinc acetate   Topical Daily PRN Raj Guerrero MD      famotidine  20 mg Oral BID WALLY Baptiste      hydrOXYzine HCL  100 mg Oral Q6H PRN Max 4/day Basilio Brown MD      Or    LORazepam  2 mg Intramuscular Q6H PRN Basilio Brown MD      hydrOXYzine HCL  25 mg Oral Q6H PRN Max 4/day Basilio Brown MD      levothyroxine  37.5 mcg Oral Early Morning WALLY Baptiste      melatonin  6 mg Oral HS PRN WALLY Gomez      methocarbamol  500 mg Oral Q6H PRN WALLY Baptiste      OLANZapine  5 mg Oral Q4H PRN Max 3/day Basilio Brown MD      Or    OLANZapine  2.5 mg Intramuscular Q4H PRN Max 3/day Basilio Brown MD      OLANZapine  5 mg Oral Q3H PRN Max 3/day Basilio Brown MD      Or    OLANZapine  5 mg Intramuscular Q3H PRN Max 3/day Basilio Brown MD      OLANZapine  2.5 mg Oral Q4H PRN Max 6/day Basilio Brown MD      polyethylene glycol  17 g Oral Daily PRN Basilio Brown MD      propranolol  10 mg Oral Q8H PRN Basilio Brown MD      senna-docusate sodium  2 tablet Oral After Dinner Dustin Mcallister MD      sertraline  150 mg Oral Daily Rachana Borrego  "WALLY Higgins      traZODone  50 mg Oral HS PRN Basilio Brown MD        PRN:    acetaminophen    acetaminophen    acetaminophen    aluminum-magnesium hydroxide-simethicone    Artificial Tears    benztropine    benztropine    Diclofenac Sodium    hydrOXYzine HCL **OR** diphenhydrAMINE    diphenhydrAMINE-zinc acetate    hydrOXYzine HCL **OR** LORazepam    hydrOXYzine HCL    melatonin    methocarbamol    OLANZapine **OR** OLANZapine    OLANZapine **OR** OLANZapine    OLANZapine    polyethylene glycol    propranolol    traZODone       Subjective     Patient was visited on unit for continuing care; chart reviewed and discussed with multidisciplinary treatment team.  On approach, the patient was calm and cooperative. Denied any changes in mood, appetite, and energy level. No problem initiating and maintaining sleep.  Denied A/VH currently.  Denied SI/HI, intent or plan upon direct inquiry at this time.    Patient continues to be visible in the milieu and interacts with staff and peers. No reports of aggression or self-injurious behavior on unit. No PRN medications used in the past 24 hours.    Patient accepted all offered medications and no adverse effects of medications noted or reported.    Objective    Current Mental Status Evaluation:  Mental Status Exam  Appearance: casually dressed, consistent with stated age  Motor: no psychomotor retardation, no gait abnormalities  Behavior: cooperative, answers questions appropriately  Speech: soft, normal rhythm  Mood: \"good\"  Affect: blunted  Thought Process: linear and goal-oriented  Thought Content: denies delusions  Risk Potential: denies suicidal ideation, plan, or intent. Denies homicidal ideation  Perceptions: denies auditory hallucinations, denies visual hallucinations,   Sensorium: Oriented to person, place, time, and situation  Cognition: cognitive ability appears intact but was not quantitatively tested  Consciousness: alert and awake  Attention: intact, able to " focus without difficulty  Insight: limited  Judgement: limited            Vital signs in last 24 hours:    Temp:  [97 °F (36.1 °C)-97.2 °F (36.2 °C)] 97 °F (36.1 °C)  HR:  [65-90] 65  BP: (114-117)/(65-70) 114/65  Resp:  [16] 16  SpO2:  [96 %-98 %] 98 %  O2 Device: None (Room air)    Psychiatric Review of Systems:  Medication adverse effects: none  Sleep: unchanged  Appetite: unchanged  Behaviors over the past 24 hours: unchanged    Laboratory results:    I have personally reviewed all pertinent laboratory/tests results  No results found for this or any previous visit (from the past 48 hours).       Progress Toward Goals & Illness Status:   progressing, mood is stabilizing, placement pending    Patient is not at goal. They are not yet ready for discharge. The patient's condition currently requires active psychopharmacological medication management, interdisciplinary coordination with case management, and the utilization of adjunctive milieu and group therapy to augment psychopharmacological efficacy. The patient's risk of morbidity, and progression or decompensation of psychiatric disease, is higher without this current treatment.     Next of Kin  Extended Emergency Contact Information  Primary Emergency Contact: Lois Roberson  Address: [address] [place] [address] of [place]  Home Phone: [phone]  Relation: Mother    Counseling / Coordination of Care  Patient's progress discussed with staff in treatment team meeting.  Medications, treatment progress and treatment plan reviewed with patient.  Medication education provided to patient.  Educated on importance of medication and treatment compliance.  Supportive therapy provided to patient.  Cognitive techniques utilized during the session.  Reassurance and supportive therapy provided.  Reoriented to reality and reassured.  Encouraged participation in milieu and group therapy on the unit.  Crisis/safety plan discussed  with patient.       Dustin Mcallister MD  Attending Psychiatrist   [facility]      This note has been constructed using a voice recognition system. There may be translation, syntax, or grammatical errors. If you have any questions, please contact the dictating provider.

## 2025-02-22 NOTE — PLAN OF CARE
Problem: Ineffective Coping  Goal: Participates in unit activities  Description: Interventions:  - Provide therapeutic environment   - Provide required programming   - Redirect inappropriate behaviors   Outcome: Progressing     Problem: Depression  Goal: Treatment Goal: Demonstrate behavioral control of depressive symptoms, verbalize feelings of improved mood/affect, and adopt new coping skills prior to discharge  Outcome: Progressing  Goal: Verbalize thoughts and feelings  Description: Interventions:  - Assess and re-assess patient's level of risk   - Engage patient in 1:1 interactions, daily, for a minimum of 15 minutes   - Encourage patient to express feelings, fears, frustrations, hopes   Outcome: Progressing  Goal: Refrain from harming self  Description: Interventions:  - Monitor patient closely, per order   - Supervise medication ingestion, monitor effects and side effects   Outcome: Progressing  Goal: Refrain from isolation  Description: Interventions:  - Develop a trusting relationship   - Encourage socialization   Outcome: Progressing     Problem: Anxiety  Goal: Anxiety is at manageable level  Description: Interventions:  - Assess and monitor patient's anxiety level.   - Monitor for signs and symptoms (heart palpitations, chest pain, shortness of breath, headaches, nausea, feeling jumpy, restlessness, irritable, apprehensive).   - Collaborate with interdisciplinary team and initiate plan and interventions as ordered.  - [place] patient to unit/surroundings  - Explain treatment plan  - Encourage participation in care  - Encourage verbalization of concerns/fears  - Identify coping mechanisms  - Assist in developing anxiety-reducing skills  - Administer/offer alternative therapies  - Limit or eliminate stimulants  Outcome: Progressing     Problem: DISCHARGE PLANNING - CARE MANAGEMENT  Goal: Discharge to post-acute care or home with appropriate resources  Description: INTERVENTIONS:  - Conduct assessment to  determine patient/family and health care team treatment goals, and need for post-acute services based on payer coverage, community resources, and patient preferences, and barriers to discharge  - Address psychosocial, clinical, and financial barriers to discharge as identified in assessment in conjunction with the patient/family and health care team  - Arrange appropriate level of post-acute services according to patient’s   needs and preference and payer coverage in collaboration with the physician and health care team  - Communicate with and update the patient/family, physician, and health care team regarding progress on the discharge plan  - Arrange appropriate transportation to post-acute venues  Outcome: Progressing     Problem: Knowledge Deficit  Goal: Patient/family/caregiver demonstrates understanding of disease process, treatment plan, medications, and discharge instructions  Description: Complete learning assessment and assess knowledge base.  Interventions:  - Provide teaching at level of understanding  - Provide teaching via preferred learning methods  Outcome: Progressing     Problem: SLEEP DISTURBANCE  Goal: Will exhibit normal sleeping pattern  Description: Interventions:  -  Assess the patients sleep pattern, noting recent changes  - Administer medication as ordered  - Decrease environmental stimuli, including noise, as appropriate during the night  - Encourage the patient to actively participate in unit groups and or exercise during the day to enhance ability to achieve adequate sleep at night  - Assess the patient, in the morning, encouraging a description of sleep experience  Outcome: Progressing

## 2025-02-22 NOTE — ASSESSMENT & PLAN NOTE
"Patient continues to do well and the plan is to continue with the current treatment plan as mentioned below with no changes.    Patient continues to be pending group home placement. Otherwise no clinical significant change.   continues to look for group home/personal-care home placement however this can be significantly impacted by patient no longer having money in the [employer] account and his bank account being over drawn.  Patient is quite altruistic in his desire not to have his friend go to [facility] however continues to display poor to limited insight regarding his ability to advocate for himself and organize/prioritize his own needs given his own difficult social situation. Would be at significant risk of harm if not discharged to a structured setting with support.    Medication regimen as follows, no changes as of 2/22/2025:  Abilify 5 mg daily as mood adjunct   Zoloft 150 mg daily for depression and anxiety  Continue to encourage participation in group therapy, milieu therapy and occupational therapy.  Continue to assess for side effects of medications.  Continue collaboration with [facility] for medical co-morbidities as indicated.  Continue discussion with CM/SW to assist with obtaining collateral, disposition planning, and the implementation of patient-centered individualized plan of care.  Continue frequent safety checks and vitals per unit protocol.    Risks, benefits and possible side effects of Medications: Risks, benefits, and possible side effects of medications have previously been explained. No new medications at this time.      Legal status: 201    Disposition: to be determined, pending SOAR application for potential group home placement. OT Cognitive Evaluation completed: \"Pt would benefit from discharge to a supportive environment that can provide checks for safety and compliance with IADLs. \"   Although patient's mood has stabilized, they are currently awaiting group home placement.  Their " ability to recognize safety hazards take medications and participate in IADLs are significantly impaired by their cognitive deficits compounded by their chronic mental health needs and would be at risk for significant decompensation without the structure and support of a group home setting.      No associated orders from this encounter found during lookback period of 72 hours.

## 2025-02-22 NOTE — NURSING NOTE
Patient secluded to room this shift. Patient appears to be depressed and withdrawn to room. Patient makes needs known, come out to retrieve meal tray. Denies SI/HI/AH/VH at this time. Compliant with medications and routine vitals. Pending placement.

## 2025-02-23 PROCEDURE — 99232 SBSQ HOSP IP/OBS MODERATE 35: CPT | Performed by: PSYCHIATRY & NEUROLOGY

## 2025-02-23 RX ADMIN — SERTRALINE HYDROCHLORIDE 150 MG: 100 TABLET ORAL at 09:12

## 2025-02-23 RX ADMIN — ARIPIPRAZOLE 5 MG: 5 TABLET ORAL at 09:12

## 2025-02-23 RX ADMIN — CYANOCOBALAMIN TAB 1000 MCG 1000 MCG: 1000 TAB at 09:12

## 2025-02-23 RX ADMIN — Medication 2500 UNITS: at 09:12

## 2025-02-23 RX ADMIN — ATORVASTATIN CALCIUM 40 MG: 40 TABLET, FILM COATED ORAL at 17:02

## 2025-02-23 RX ADMIN — LEVOTHYROXINE SODIUM 37.5 MCG: 0.07 TABLET ORAL at 06:49

## 2025-02-23 RX ADMIN — FAMOTIDINE 20 MG: 20 TABLET ORAL at 17:02

## 2025-02-23 RX ADMIN — SENNOSIDES AND DOCUSATE SODIUM 2 TABLET: 50; 8.6 TABLET ORAL at 17:02

## 2025-02-23 RX ADMIN — FAMOTIDINE 20 MG: 20 TABLET ORAL at 09:12

## 2025-02-23 NOTE — PROGRESS NOTES
Progress Note - Behavioral Health   Name: Franco Roberson 39 y.o. male I MRN: [id number]  Unit/Bed#: -01 I Date of Admission: 5/14/2024   Date of Service: 2/23/2025 I Hospital Day: 285     Assessment & Plan  MDD (major depressive disorder), recurrent severe, without psychosis (HCC)  Patient continues to do well and the plan is to continue with the current treatment plan as mentioned below with no changes.    Patient continues to be pending group home placement. Otherwise no clinical significant change.   continues to look for group home/personal-care home placement however this can be significantly impacted by patient no longer having money in the [employer] account and his bank account being over drawn.  Patient is quite altruistic in his desire not to have his friend go to senior living however continues to display poor to limited insight regarding his ability to advocate for himself and organize/prioritize his own needs given his own difficult social situation. Would be at significant risk of harm if not discharged to a structured setting with support.    Medication regimen as follows, no changes as of 2/23/2025:  Abilify 5 mg daily as mood adjunct   Zoloft 150 mg daily for depression and anxiety  Continue to encourage participation in group therapy, milieu therapy and occupational therapy.  Continue to assess for side effects of medications.  Continue collaboration with PATRICIA for medical co-morbidities as indicated.  Continue discussion with CM/SW to assist with obtaining collateral, disposition planning, and the implementation of patient-centered individualized plan of care.  Continue frequent safety checks and vitals per unit protocol.    Risks, benefits and possible side effects of Medications: Risks, benefits, and possible side effects of medications have previously been explained. No new medications at this time.      Legal status: 201    Disposition: to be determined, pending SOAR application for potential  "group home placement. OT Cognitive Evaluation completed: \"Pt would benefit from discharge to a supportive environment that can provide checks for safety and compliance with IADLs. \"   Although patient's mood has stabilized, they are currently awaiting group home placement.  Their ability to recognize safety hazards take medications and participate in IADLs are significantly impaired by their cognitive deficits compounded by their chronic mental health needs and would be at risk for significant decompensation without the structure and support of a group home setting.      No associated orders from this encounter found during lookback period of 72 hours.  Autism spectrum disorder  Continue supportive care    No associated orders from this encounter found during lookback period of 72 hours.    Hyperlipidemia  - Lipitor 40 mg       Plan     Recommended Treatment:    - Encourage early mobility and having a structured day  - Provide frequent re-orientation, and cognitive stimulation  - Ensure assistive devices are in proper working order (eye-glasses, hearing aids)  - Encourage adequate hydration, nutrition and monitor bowel movements  - Maintain sleep-wake cycle: Uninterrupted sleep time; low-level lighting at night  - Fall precaution  - f/u [facility] recs regarding the medical problems   - Continue medication titration and treatment plan; adjust medication to optimize treatment response and as clinically indicated. .   - Observation: routine            - VS: as per unit protocol  - Diet: Regular diet  - Encourage group attendance and milieu therapy  - Dispo: To be determined     Scheduled medications:  Current Facility-Administered Medications   Medication Dose Route Frequency Provider Last Rate    acetaminophen  650 mg Oral Q6H PRN Basilio Brown MD      acetaminophen  650 mg Oral Q4H PRN Basilio Brown MD      acetaminophen  975 mg Oral Q6H PRN Basilio Brown MD      aluminum-magnesium hydroxide-simethicone  " 30 mL Oral Q4H PRN Basilio Brown MD      ARIPiprazole  5 mg Oral Daily Basilio Panda MD      Artificial Tears  1 drop Both Eyes Q3H PRN Basilio Brown MD      atorvastatin  40 mg Oral Daily With Dinner WALLY Baptiste      benztropine  1 mg Intramuscular Q4H PRN Max 6/day Basilio Brown MD      benztropine  1 mg Oral Q4H PRN Max 6/day Basilio Brown MD      Cholecalciferol  2,500 Units Oral Daily Dustin Mcallister MD      cyanocobalamin  1,000 mcg Oral Daily WALLY Baptiste      Diclofenac Sodium  2 g Topical 4x Daily PRN WALLY Baptiste      hydrOXYzine HCL  50 mg Oral Q6H PRN Max 4/day Basilio Brown MD      Or    diphenhydrAMINE  50 mg Intramuscular Q6H PRN Basilio Brown MD      diphenhydrAMINE-zinc acetate   Topical Daily PRN Raj Guerrero MD      famotidine  20 mg Oral BID WALLY Baptiste      hydrOXYzine HCL  100 mg Oral Q6H PRN Max 4/day Basilio Brown MD      Or    LORazepam  2 mg Intramuscular Q6H PRN Basilio Brown MD      hydrOXYzine HCL  25 mg Oral Q6H PRN Max 4/day Basilio Brown MD      levothyroxine  37.5 mcg Oral Early Morning WALLY Baptiste      melatonin  6 mg Oral HS PRN WALLY Gomez      methocarbamol  500 mg Oral Q6H PRN WALLY Baptiste      OLANZapine  5 mg Oral Q4H PRN Max 3/day Basilio Brown MD      Or    OLANZapine  2.5 mg Intramuscular Q4H PRN Max 3/day Basilio Brown MD      OLANZapine  5 mg Oral Q3H PRN Max 3/day Basilio Brown MD      Or    OLANZapine  5 mg Intramuscular Q3H PRN Max 3/day Basilio Brown MD      OLANZapine  2.5 mg Oral Q4H PRN Max 6/day Basilio Brown MD      polyethylene glycol  17 g Oral Daily PRN Basilio Brown MD      propranolol  10 mg Oral Q8H PRN Basilio Brown MD      senna-docusate sodium  2 tablet Oral After Dinner Dustin Mcallister MD      sertraline  150 mg Oral Daily Rachana Borrego  "WALLY Higgins      traZODone  50 mg Oral HS PRN Basilio Brown MD        PRN:    acetaminophen    acetaminophen    acetaminophen    aluminum-magnesium hydroxide-simethicone    Artificial Tears    benztropine    benztropine    Diclofenac Sodium    hydrOXYzine HCL **OR** diphenhydrAMINE    diphenhydrAMINE-zinc acetate    hydrOXYzine HCL **OR** LORazepam    hydrOXYzine HCL    melatonin    methocarbamol    OLANZapine **OR** OLANZapine    OLANZapine **OR** OLANZapine    OLANZapine    polyethylene glycol    propranolol    traZODone       Subjective     Patient was visited on unit for continuing care; chart reviewed and discussed with multidisciplinary treatment team.  On approach, the patient was calm and cooperative. Denied any changes in mood, appetite, and energy level. No problem initiating and maintaining sleep.  Denied A/VH currently.  Denied SI/HI, intent or plan upon direct inquiry at this time.    Patient continues to be visible in the milieu and interacts with staff and peers. No reports of aggression or self-injurious behavior on unit. No PRN medications used in the past 24 hours.    Patient accepted all offered medications and no adverse effects of medications noted or reported.    Objective    Current Mental Status Evaluation:  Mental Status Exam  Appearance: casually dressed, consistent with stated age  Motor: no psychomotor retardation, no gait abnormalities  Behavior: cooperative, answers questions appropriately  Speech: soft, normal rhythm  Mood: \"fine, doing good\"  Affect: blunted  Thought Process: concrete  Thought Content: denies delusions  Risk Potential: denies suicidal ideation, plan, or intent. Denies homicidal ideation  Perceptions: denies auditory hallucinations, denies visual hallucinations,   Sensorium: Oriented to person, place, time, and situation  Cognition: cognitive ability appears intact but was not quantitatively tested  Consciousness: alert and awake  Attention: intact, able to " focus without difficulty  Insight: limited  Judgement: limited            Vital signs in last 24 hours:    Temp:  [96.4 °F (35.8 °C)-97.1 °F (36.2 °C)] 96.4 °F (35.8 °C)  HR:  [66-76] 66  BP: (104-119)/(69-73) 104/69  Resp:  [16] 16  SpO2:  [95 %-96 %] 95 %  O2 Device: None (Room air)    Psychiatric Review of Systems:  Medication adverse effects: none  Sleep: unchanged  Appetite: unchanged  Behaviors over the past 24 hours: unchanged    Laboratory results:    I have personally reviewed all pertinent laboratory/tests results  No results found for this or any previous visit (from the past 48 hours).       Progress Toward Goals & Illness Status:   progressing, attends groups, participates in milieu therapy, mood is stabilizing, discharge planning, placement pending    Patient is not at goal. They are not yet ready for discharge. The patient's condition currently requires active psychopharmacological medication management, interdisciplinary coordination with case management, and the utilization of adjunctive milieu and group therapy to augment psychopharmacological efficacy. The patient's risk of morbidity, and progression or decompensation of psychiatric disease, is higher without this current treatment.     Next of Kin  Extended Emergency Contact Information  Primary Emergency Contact: Lois Roberson  Address: [address] [place] [address]  Home Phone: [phone]  Relation: Mother    Counseling / Coordination of Care  Patient's progress discussed with staff in treatment team meeting.  Medications, treatment progress and treatment plan reviewed with patient.  Medication education provided to patient.  Educated on importance of medication and treatment compliance.  Supportive therapy provided to patient.  Cognitive techniques utilized during the session.  Reassurance and supportive therapy provided.  Reoriented to reality and reassured.  Encouraged participation  in milieu and group therapy on the unit.  Crisis/safety plan discussed with patient.       Dustin Mcallister MD  Attending Psychiatrist   [facility]      This note has been constructed using a voice recognition system. There may be translation, syntax, or grammatical errors. If you have any questions, please contact the dictating provider.

## 2025-02-23 NOTE — ASSESSMENT & PLAN NOTE
"Patient continues to do well and the plan is to continue with the current treatment plan as mentioned below with no changes.    Patient continues to be pending group home placement. Otherwise no clinical significant change.   continues to look for group home/personal-care home placement however this can be significantly impacted by patient no longer having money in the [employer] account and his bank account being over drawn.  Patient is quite altruistic in his desire not to have his friend go to [facility] however continues to display poor to limited insight regarding his ability to advocate for himself and organize/prioritize his own needs given his own difficult social situation. Would be at significant risk of harm if not discharged to a structured setting with support.    Medication regimen as follows, no changes as of 2/23/2025:  Abilify 5 mg daily as mood adjunct   Zoloft 150 mg daily for depression and anxiety  Continue to encourage participation in group therapy, milieu therapy and occupational therapy.  Continue to assess for side effects of medications.  Continue collaboration with [facility] for medical co-morbidities as indicated.  Continue discussion with CM/SW to assist with obtaining collateral, disposition planning, and the implementation of patient-centered individualized plan of care.  Continue frequent safety checks and vitals per unit protocol.    Risks, benefits and possible side effects of Medications: Risks, benefits, and possible side effects of medications have previously been explained. No new medications at this time.      Legal status: 201    Disposition: to be determined, pending SOAR application for potential group home placement. OT Cognitive Evaluation completed: \"Pt would benefit from discharge to a supportive environment that can provide checks for safety and compliance with IADLs. \"   Although patient's mood has stabilized, they are currently awaiting group home placement.  Their " ability to recognize safety hazards take medications and participate in IADLs are significantly impaired by their cognitive deficits compounded by their chronic mental health needs and would be at risk for significant decompensation without the structure and support of a group home setting.      No associated orders from this encounter found during lookback period of 72 hours.

## 2025-02-23 NOTE — NURSING NOTE
Patient has been keeping to himself - briefly walking in the hallways on a few occasions but otherwise lying in bed reading. He denies S.I.H.I.A/H V/H

## 2025-02-23 NOTE — PLAN OF CARE
Problem: Depression  Goal: Treatment Goal: Demonstrate behavioral control of depressive symptoms, verbalize feelings of improved mood/affect, and adopt new coping skills prior to discharge  Outcome: Progressing  Goal: Verbalize thoughts and feelings  Description: Interventions:  - Assess and re-assess patient's level of risk   - Engage patient in 1:1 interactions, daily, for a minimum of 15 minutes   - Encourage patient to express feelings, fears, frustrations, hopes   Outcome: Progressing  Goal: Refrain from harming self  Description: Interventions:  - Monitor patient closely, per order   - Supervise medication ingestion, monitor effects and side effects   Outcome: Progressing  Goal: Refrain from isolation  Description: Interventions:  - Develop a trusting relationship   - Encourage socialization   Outcome: Progressing     Problem: Anxiety  Goal: Anxiety is at manageable level  Description: Interventions:  - Assess and monitor patient's anxiety level.   - Monitor for signs and symptoms (heart palpitations, chest pain, shortness of breath, headaches, nausea, feeling jumpy, restlessness, irritable, apprehensive).   - Collaborate with interdisciplinary team and initiate plan and interventions as ordered.  - Post patient to unit/surroundings  - Explain treatment plan  - Encourage participation in care  - Encourage verbalization of concerns/fears  - Identify coping mechanisms  - Assist in developing anxiety-reducing skills  - Administer/offer alternative therapies  - Limit or eliminate stimulants  Outcome: Progressing     Problem: Knowledge Deficit  Goal: Patient/family/caregiver demonstrates understanding of disease process, treatment plan, medications, and discharge instructions  Description: Complete learning assessment and assess knowledge base.  Interventions:  - Provide teaching at level of understanding  - Provide teaching via preferred learning methods  Outcome: Progressing     Problem: SLEEP  DISTURBANCE  Goal: Will exhibit normal sleeping pattern  Description: Interventions:  -  Assess the patients sleep pattern, noting recent changes  - Administer medication as ordered  - Decrease environmental stimuli, including noise, as appropriate during the night  - Encourage the patient to actively participate in unit groups and or exercise during the day to enhance ability to achieve adequate sleep at night  - Assess the patient, in the morning, encouraging a description of sleep experience  Outcome: Progressing     Problem: Ineffective Coping  Goal: Participates in unit activities  Description: Interventions:  - Provide therapeutic environment   - Provide required programming   - Redirect inappropriate behaviors   Outcome: Not Progressing

## 2025-02-23 NOTE — NURSING NOTE
Pt is withdrawn to his room and resting in bed. Denies any SI/HI/AH/VH at this time. Med and meal compliant. Denies any unmet needs or complaints.

## 2025-02-24 PROCEDURE — 99232 SBSQ HOSP IP/OBS MODERATE 35: CPT | Performed by: PSYCHIATRY & NEUROLOGY

## 2025-02-24 RX ADMIN — ATORVASTATIN CALCIUM 40 MG: 40 TABLET, FILM COATED ORAL at 17:16

## 2025-02-24 RX ADMIN — SERTRALINE HYDROCHLORIDE 150 MG: 100 TABLET ORAL at 08:24

## 2025-02-24 RX ADMIN — Medication 2500 UNITS: at 08:24

## 2025-02-24 RX ADMIN — FAMOTIDINE 20 MG: 20 TABLET ORAL at 08:24

## 2025-02-24 RX ADMIN — LEVOTHYROXINE SODIUM 37.5 MCG: 0.07 TABLET ORAL at 06:29

## 2025-02-24 RX ADMIN — SENNOSIDES AND DOCUSATE SODIUM 2 TABLET: 50; 8.6 TABLET ORAL at 17:16

## 2025-02-24 RX ADMIN — FAMOTIDINE 20 MG: 20 TABLET ORAL at 17:16

## 2025-02-24 RX ADMIN — CYANOCOBALAMIN TAB 1000 MCG 1000 MCG: 1000 TAB at 08:24

## 2025-02-24 RX ADMIN — ARIPIPRAZOLE 5 MG: 5 TABLET ORAL at 08:24

## 2025-02-24 NOTE — PLAN OF CARE
Problem: Depression  Goal: Treatment Goal: Demonstrate behavioral control of depressive symptoms, verbalize feelings of improved mood/affect, and adopt new coping skills prior to discharge  Outcome: Progressing  Goal: Verbalize thoughts and feelings  Description: Interventions:  - Assess and re-assess patient's level of risk   - Engage patient in 1:1 interactions, daily, for a minimum of 15 minutes   - Encourage patient to express feelings, fears, frustrations, hopes   Outcome: Progressing  Goal: Refrain from harming self  Description: Interventions:  - Monitor patient closely, per order   - Supervise medication ingestion, monitor effects and side effects   Outcome: Progressing  Goal: Refrain from isolation  Description: Interventions:  - Develop a trusting relationship   - Encourage socialization   Outcome: Progressing     Problem: Anxiety  Goal: Anxiety is at manageable level  Description: Interventions:  - Assess and monitor patient's anxiety level.   - Monitor for signs and symptoms (heart palpitations, chest pain, shortness of breath, headaches, nausea, feeling jumpy, restlessness, irritable, apprehensive).   - Collaborate with interdisciplinary team and initiate plan and interventions as ordered.  - [place] patient to unit/surroundings  - Explain treatment plan  - Encourage participation in care  - Encourage verbalization of concerns/fears  - Identify coping mechanisms  - Assist in developing anxiety-reducing skills  - Administer/offer alternative therapies  - Limit or eliminate stimulants  Outcome: Progressing     Problem: DISCHARGE PLANNING - CARE MANAGEMENT  Goal: Discharge to post-acute care or home with appropriate resources  Description: INTERVENTIONS:  - Conduct assessment to determine patient/family and health care team treatment goals, and need for post-acute services based on payer coverage, community resources, and patient preferences, and barriers to discharge  - Address psychosocial, clinical,  and financial barriers to discharge as identified in assessment in conjunction with the patient/family and health care team  - Arrange appropriate level of post-acute services according to patient’s   needs and preference and payer coverage in collaboration with the physician and health care team  - Communicate with and update the patient/family, physician, and health care team regarding progress on the discharge plan  - Arrange appropriate transportation to post-acute venues  Outcome: Progressing     Problem: Knowledge Deficit  Goal: Patient/family/caregiver demonstrates understanding of disease process, treatment plan, medications, and discharge instructions  Description: Complete learning assessment and assess knowledge base.  Interventions:  - Provide teaching at level of understanding  - Provide teaching via preferred learning methods  Outcome: Progressing     Problem: SLEEP DISTURBANCE  Goal: Will exhibit normal sleeping pattern  Description: Interventions:  -  Assess the patients sleep pattern, noting recent changes  - Administer medication as ordered  - Decrease environmental stimuli, including noise, as appropriate during the night  - Encourage the patient to actively participate in unit groups and or exercise during the day to enhance ability to achieve adequate sleep at night  - Assess the patient, in the morning, encouraging a description of sleep experience  Outcome: Progressing

## 2025-02-24 NOTE — PROGRESS NOTES
02/24/25 0842   Team Meeting   Meeting Type Daily Rounds   Team Members Present   Team Members Present Physician;Nurse;   Physician Team Member Ary   [facility]ing Team [facility] Noa   Patient/Family Present   Patient Present No   Patient's Family Present No     Pt is medication and meal compliant. Pt is calm and cooperative. Pt denies SI/HI/AVH. Pt's discharge is pending placement.

## 2025-02-24 NOTE — ASSESSMENT & PLAN NOTE
"Patient continues to do well and the plan is to continue with the current treatment plan as mentioned below with no changes.    Patient continues to be pending group home placement. Otherwise no clinical significant change.   continues to look for group home/personal-care home placement however this can be significantly impacted by patient no longer having money in the [employer] account and his bank account being over drawn.  Patient is quite altruistic in his desire not to have his friend go to alf however continues to display poor to limited insight regarding his ability to advocate for himself and organize/prioritize his own needs given his own difficult social situation. Would be at significant risk of harm if not discharged to a structured setting with support.    Medication regimen as follows, no changes as of 2/23/2025:  Abilify 5 mg daily as mood adjunct   Zoloft 150 mg daily for depression and anxiety  Continue to encourage participation in group therapy, milieu therapy and occupational therapy.  Continue to assess for side effects of medications.  Continue collaboration with [facility]IM for medical co-morbidities as indicated.  Continue discussion with CM/SW to assist with obtaining collateral, disposition planning, and the implementation of patient-centered individualized plan of care.  Continue frequent safety checks and vitals per unit protocol.    Risks, benefits and possible side effects of Medications: Risks, benefits, and possible side effects of medications have previously been explained. No new medications at this time.      Legal status: 201    Disposition: to be determined, pending SOAR application for potential group home placement. OT Cognitive Evaluation completed: \"Pt would benefit from discharge to a supportive environment that can provide checks for safety and compliance with IADLs. \"   Although patient's mood has stabilized, they are currently awaiting group home placement.  Their " ability to recognize safety hazards take medications and participate in IADLs are significantly impaired by their cognitive deficits compounded by their chronic mental health needs and would be at risk for significant decompensation without the structure and support of a group home setting.      No associated orders from this encounter found during lookback period of 72 hours.

## 2025-02-24 NOTE — NURSING NOTE
Patient has been out walking in the milieu a couple of times this evening but he is withdrawn. Otherwise he has been in his room and in his bed. He denies S.I.H.I.A/H V/H

## 2025-02-24 NOTE — NURSING NOTE
Pleasant, calm and cooperative. Denies symptoms. Occasionally about the unit but keeping to himself. Compliant with medications. Waiting placement.

## 2025-02-24 NOTE — PROGRESS NOTES
Patient: Loraine Holder Date of Service: 3/21/2019   : 1944 MRN: [id number]     SUBJECTIVE:     HISTORY OF PRESENT ILLNESS:  Runny nose noted last week  Sneezing  Denies eye itching   demies cough   Denies use of OTC med  also wants her BP checked- was at the dentist and was tolf BP was 150's           Patient's medications, allergies, past medical, surgical, social and family histories were reviewed and updated as appropriate.        PAST MEDICAL HISTORY:  Past Medical History:   Diagnosis Date   • No known problems    :      No current outpatient medications on file.     No current facility-administered medications for this visit.    :      ALLERGIES:  No Known Allergies:        No past surgical history on file.:      SOCIAL HISTORY:  Social History     Tobacco Use   • Smoking status: Never Smoker   • Smokeless tobacco: Never Used   Substance Use Topics   • Alcohol use: Not on file   • Drug use: Not on file       Review of Systems   Constitutional: Negative for fever.   HENT: Positive for rhinorrhea and sneezing. Negative for congestion, ear pain and sore throat.    Eyes: Negative for itching.   Respiratory: Negative for cough.    Gastrointestinal: Negative for vomiting.   Neurological: Negative for dizziness.         OBJECTIVE:     Physical Exam   Constitutional: She appears well-developed.   HENT:   Head: Normocephalic.   Right Ear: Tympanic membrane and ear canal normal.   Left Ear: Tympanic membrane and ear canal normal.   Nose: Mucosal edema and rhinorrhea present.   Mouth/Throat: Uvula is midline.   Eyes: Conjunctivae and EOM are normal.   Neck: Normal range of motion.   Lymphadenopathy:     She has no cervical adenopathy.   Vitals reviewed.      Visit Vitals  /80   Pulse 80   Temp 98.9 °F (37.2 °C)   Resp 18       DIAGNOSTIC STUDIES:     No results found for this visit on 19.        Assessment AND PLAN:     Assessment   Problem List Items Addressed This Visit        Respiratory    Allergic  Progress Note - Behavioral Health   Name: Franco Roberson 39 y.o. male I MRN: [id number]  Unit/Bed#: -01 I Date of Admission: 5/14/2024   Date of Service: 2/24/2025 I Hospital Day: 286     Assessment & Plan  MDD (major depressive disorder), recurrent severe, without psychosis (HCC)  Patient continues to do well and the plan is to continue with the current treatment plan as mentioned below with no changes.    Patient continues to be pending group home placement. Otherwise no clinical significant change.   continues to look for group home/personal-care home placement however this can be significantly impacted by patient no longer having money in the [employer] account and his bank account being over drawn.  Patient is quite altruistic in his desire not to have his friend go to [facility] however continues to display poor to limited insight regarding his ability to advocate for himself and organize/prioritize his own needs given his own difficult social situation. Would be at significant risk of harm if not discharged to a structured setting with support.    Medication regimen as follows, no changes as of 2/23/2025:  Abilify 5 mg daily as mood adjunct   Zoloft 150 mg daily for depression and anxiety  Continue to encourage participation in group therapy, milieu therapy and occupational therapy.  Continue to assess for side effects of medications.  Continue collaboration with PATRICIA for medical co-morbidities as indicated.  Continue discussion with CM/SW to assist with obtaining collateral, disposition planning, and the implementation of patient-centered individualized plan of care.  Continue frequent safety checks and vitals per unit protocol.    Risks, benefits and possible side effects of Medications: Risks, benefits, and possible side effects of medications have previously been explained. No new medications at this time.      Legal status: 201    Disposition: to be determined, pending SOAR application for potential  "group home placement. OT Cognitive Evaluation completed: \"Pt would benefit from discharge to a supportive environment that can provide checks for safety and compliance with IADLs. \"   Although patient's mood has stabilized, they are currently awaiting group home placement.  Their ability to recognize safety hazards take medications and participate in IADLs are significantly impaired by their cognitive deficits compounded by their chronic mental health needs and would be at risk for significant decompensation without the structure and support of a group home setting.      No associated orders from this encounter found during lookback period of 72 hours.  Autism spectrum disorder  Continue supportive care    No associated orders from this encounter found during lookback period of 72 hours.    Hyperlipidemia  - Lipitor 40 mg       Plan     Recommended Treatment:    - Encourage early mobility and having a structured day  - Provide frequent re-orientation, and cognitive stimulation  - Ensure assistive devices are in proper working order (eye-glasses, hearing aids)  - Encourage adequate hydration, nutrition and monitor bowel movements  - Maintain sleep-wake cycle: Uninterrupted sleep time; low-level lighting at night  - Fall precaution  - f/u [facility] recs regarding the medical problems   - Continue medication titration and treatment plan; adjust medication to optimize treatment response and as clinically indicated. .   - Observation: routine            - VS: as per unit protocol  - Diet: Regular diet  - Encourage group attendance and milieu therapy  - Dispo: To be determined     Scheduled medications:  Current Facility-Administered Medications   Medication Dose Route Frequency Provider Last Rate    acetaminophen  650 mg Oral Q6H PRN Basilio Brown MD      acetaminophen  650 mg Oral Q4H PRN Basilio Brown MD      acetaminophen  975 mg Oral Q6H PRN Basilio Brown MD      aluminum-magnesium hydroxide-simethicone  " rhinitis - Primary          Discussed use of local honey 1 tsp 2x a day  steam  Fluids  Avoid dairy if congested   Discharge instruction provided and discussed with patient/parent.      Katerina Rubin, CNP                       30 mL Oral Q4H PRN Basilio Brown MD      ARIPiprazole  5 mg Oral Daily Basilio Panda MD      Artificial Tears  1 drop Both Eyes Q3H PRN Basilio Brown MD      atorvastatin  40 mg Oral Daily With Dinner WALLY Baptiste      benztropine  1 mg Intramuscular Q4H PRN Max 6/day Basilio Brown MD      benztropine  1 mg Oral Q4H PRN Max 6/day Basilio Brown MD      Cholecalciferol  2,500 Units Oral Daily Dustin Mcallister MD      cyanocobalamin  1,000 mcg Oral Daily WALLY Baptiste      Diclofenac Sodium  2 g Topical 4x Daily PRN WALLY Baptiste      hydrOXYzine HCL  50 mg Oral Q6H PRN Max 4/day Basilio Brown MD      Or    diphenhydrAMINE  50 mg Intramuscular Q6H PRN Basilio Brown MD      diphenhydrAMINE-zinc acetate   Topical Daily PRN Raj Guerrero MD      famotidine  20 mg Oral BID WALLY Baptiste      hydrOXYzine HCL  100 mg Oral Q6H PRN Max 4/day Basilio Brown MD      Or    LORazepam  2 mg Intramuscular Q6H PRN Basilio Brown MD      hydrOXYzine HCL  25 mg Oral Q6H PRN Max 4/day Basilio Brown MD      levothyroxine  37.5 mcg Oral Early Morning WALLY Baptiste      melatonin  6 mg Oral HS PRN WALLY Gomez      methocarbamol  500 mg Oral Q6H PRN WALLY Baptiste      OLANZapine  5 mg Oral Q4H PRN Max 3/day Basilio Brown MD      Or    OLANZapine  2.5 mg Intramuscular Q4H PRN Max 3/day Basilio Brown MD      OLANZapine  5 mg Oral Q3H PRN Max 3/day Basilio Brown MD      Or    OLANZapine  5 mg Intramuscular Q3H PRN Max 3/day Basilio Brown MD      OLANZapine  2.5 mg Oral Q4H PRN Max 6/day Basilio Brown MD      polyethylene glycol  17 g Oral Daily PRN Basilio Brown MD      propranolol  10 mg Oral Q8H PRN Basilio Brown MD      senna-docusate sodium  2 tablet Oral After Dinner Dustin Mcallister MD      sertraline  150 mg Oral Daily Rachana Borrego  "WALLY Higgins      traZODone  50 mg Oral HS PRN Basilio Brown MD        PRN:    acetaminophen    acetaminophen    acetaminophen    aluminum-magnesium hydroxide-simethicone    Artificial Tears    benztropine    benztropine    Diclofenac Sodium    hydrOXYzine HCL **OR** diphenhydrAMINE    diphenhydrAMINE-zinc acetate    hydrOXYzine HCL **OR** LORazepam    hydrOXYzine HCL    melatonin    methocarbamol    OLANZapine **OR** OLANZapine    OLANZapine **OR** OLANZapine    OLANZapine    polyethylene glycol    propranolol    traZODone       Subjective     Patient was visited on unit for continuing care; chart reviewed and discussed with multidisciplinary treatment team.  On approach, the patient was calm and cooperative. Denied any changes in mood, appetite, and energy level. No problem initiating and maintaining sleep.  Denied A/VH currently.  Denied SI/HI, intent or plan upon direct inquiry at this time. No acute complaints or concerns. Denies any physical discomfort and reports overall stable mood.    Patient continues to be visible in the milieu and interacts with staff and peers. No reports of aggression or self-injurious behavior on unit. No PRN medications used in the past 24 hours.    Patient accepted all offered medications and no adverse effects of medications noted or reported.    Objective    Current Mental Status Evaluation:  Mental Status Exam  Appearance: casually dressed, consistent with stated age  Motor: no psychomotor retardation, no gait abnormalities  Behavior: cooperative, answers questions appropriately  Speech: soft, normal rhythm  Mood: \"fine\"  Affect: blunted  Thought Process: concrete  Thought Content: denies delusions  Risk Potential: denies suicidal ideation, plan, or intent. Denies homicidal ideation  Perceptions: denies auditory hallucinations, denies visual hallucinations,   Sensorium: Oriented to person, place, time, and situation  Cognition: cognitive ability appears intact but was not " quantitatively tested  Consciousness: alert and awake  Attention: intact, able to focus without difficulty  Insight: limited  Judgement: limited            Vital signs in last 24 hours:    Temp:  [96.8 °F (36 °C)-97.9 °F (36.6 °C)] 96.8 °F (36 °C)  HR:  [67-75] 67  BP: (106-113)/(68-69) 106/68  Resp:  [16] 16  SpO2:  [95 %-97 %] 95 %  O2 Device: None (Room air)    Psychiatric Review of Systems:  Medication adverse effects: none  Sleep: unchanged  Appetite: unchanged  Behaviors over the past 24 hours: unchanged    Laboratory results:    I have personally reviewed all pertinent laboratory/tests results  No results found for this or any previous visit (from the past 48 hours).       Progress Toward Goals & Illness Status:   progressing, attends groups, participates in milieu therapy, placement pending    Patient is not at goal. They are not yet ready for discharge. The patient's condition currently requires active psychopharmacological medication management, interdisciplinary coordination with case management, and the utilization of adjunctive milieu and group therapy to augment psychopharmacological efficacy. The patient's risk of morbidity, and progression or decompensation of psychiatric disease, is higher without this current treatment.     Next of Kin  Extended Emergency Contact Information  Primary Emergency Contact: Lois Roberson  Address: [address] [place] [address] of [facility]  Home Phone: [phone]  Relation: Mother    Counseling / Coordination of Care  Patient's progress discussed with staff in treatment team meeting.  Medications, treatment progress and treatment plan reviewed with patient.  Medication education provided to patient.  Educated on importance of medication and treatment compliance.  Supportive therapy provided to patient.  Cognitive techniques utilized during the session.  Reassurance and supportive therapy provided.  Reoriented to reality and  reassured.  Encouraged participation in milieu and group therapy on the unit.  Crisis/safety plan discussed with patient.       Dustin Mcallister MD  Attending Psychiatrist   [facility]      This note has been constructed using a voice recognition system. There may be translation, syntax, or grammatical errors. If you have any questions, please contact the dictating provider.

## 2025-02-25 PROCEDURE — 99232 SBSQ HOSP IP/OBS MODERATE 35: CPT | Performed by: PSYCHIATRY & NEUROLOGY

## 2025-02-25 RX ADMIN — CYANOCOBALAMIN TAB 1000 MCG 1000 MCG: 1000 TAB at 08:28

## 2025-02-25 RX ADMIN — SENNOSIDES AND DOCUSATE SODIUM 2 TABLET: 50; 8.6 TABLET ORAL at 17:03

## 2025-02-25 RX ADMIN — ATORVASTATIN CALCIUM 40 MG: 40 TABLET, FILM COATED ORAL at 17:03

## 2025-02-25 RX ADMIN — SERTRALINE HYDROCHLORIDE 150 MG: 100 TABLET ORAL at 08:28

## 2025-02-25 RX ADMIN — FAMOTIDINE 20 MG: 20 TABLET ORAL at 08:28

## 2025-02-25 RX ADMIN — FAMOTIDINE 20 MG: 20 TABLET ORAL at 17:03

## 2025-02-25 RX ADMIN — LEVOTHYROXINE SODIUM 37.5 MCG: 0.07 TABLET ORAL at 06:33

## 2025-02-25 RX ADMIN — ARIPIPRAZOLE 5 MG: 5 TABLET ORAL at 08:28

## 2025-02-25 RX ADMIN — Medication 2500 UNITS: at 08:28

## 2025-02-25 NOTE — NURSING NOTE
Patient is calm and cooperative upon approach. Patient visible on unit, isolative to self. Patient denies SI/HI/AH/VH. Patient is compliant with meds and meals.

## 2025-02-25 NOTE — SOCIAL WORK
Cm called [employer] account with Pt to inquire where is debit card is. They reported they do not handle the checking accounts that [employer] includes the [facility] [employer] does and they can be reached at [phone]. They reported that Pt does not have a checking account through [employer] and does qualify for debit card and it should have been sent within 10 days of the first deposit. Cm verified that the debit card has not been received yet.

## 2025-02-25 NOTE — NURSING NOTE
"Pt visible on unit in milieu walking the hallway. Mainly isolative to self and withdrawn to room. He is pleasant in his interactions with peers and staff. Medication and meal compliant. He denies SI/HI/AH/VH, although he says \"I'm fine\" he appears depressed and less hopeful then previous. No PRN's requested. No unmet needs.   "

## 2025-02-25 NOTE — PLAN OF CARE
Problem: Depression  Goal: Treatment Goal: Demonstrate behavioral control of depressive symptoms, verbalize feelings of improved mood/affect, and adopt new coping skills prior to discharge  Outcome: Progressing  Goal: Verbalize thoughts and feelings  Description: Interventions:  - Assess and re-assess patient's level of risk   - Engage patient in 1:1 interactions, daily, for a minimum of 15 minutes   - Encourage patient to express feelings, fears, frustrations, hopes   Outcome: Progressing  Goal: Refrain from harming self  Description: Interventions:  - Monitor patient closely, per order   - Supervise medication ingestion, monitor effects and side effects   Outcome: Progressing  Goal: Refrain from isolation  Description: Interventions:  - Develop a trusting relationship   - Encourage socialization   Outcome: Progressing     Problem: Anxiety  Goal: Anxiety is at manageable level  Description: Interventions:  - Assess and monitor patient's anxiety level.   - Monitor for signs and symptoms (heart palpitations, chest pain, shortness of breath, headaches, nausea, feeling jumpy, restlessness, irritable, apprehensive).   - Collaborate with interdisciplinary team and initiate plan and interventions as ordered.  - [place] patient to unit/surroundings  - Explain treatment plan  - Encourage participation in care  - Encourage verbalization of concerns/fears  - Identify coping mechanisms  - Assist in developing anxiety-reducing skills  - Administer/offer alternative therapies  - Limit or eliminate stimulants  Outcome: Progressing     Problem: DISCHARGE PLANNING - CARE MANAGEMENT  Goal: Discharge to post-acute care or home with appropriate resources  Description: INTERVENTIONS:  - Conduct assessment to determine patient/family and health care team treatment goals, and need for post-acute services based on payer coverage, community resources, and patient preferences, and barriers to discharge  - Address psychosocial, clinical,  and financial barriers to discharge as identified in assessment in conjunction with the patient/family and health care team  - Arrange appropriate level of post-acute services according to patient’s   needs and preference and payer coverage in collaboration with the physician and health care team  - Communicate with and update the patient/family, physician, and health care team regarding progress on the discharge plan  - Arrange appropriate transportation to post-acute venues  Outcome: Progressing     Problem: Knowledge Deficit  Goal: Patient/family/caregiver demonstrates understanding of disease process, treatment plan, medications, and discharge instructions  Description: Complete learning assessment and assess knowledge base.  Interventions:  - Provide teaching at level of understanding  - Provide teaching via preferred learning methods  Outcome: Progressing     Problem: SLEEP DISTURBANCE  Goal: Will exhibit normal sleeping pattern  Description: Interventions:  -  Assess the patients sleep pattern, noting recent changes  - Administer medication as ordered  - Decrease environmental stimuli, including noise, as appropriate during the night  - Encourage the patient to actively participate in unit groups and or exercise during the day to enhance ability to achieve adequate sleep at night  - Assess the patient, in the morning, encouraging a description of sleep experience  Outcome: Progressing

## 2025-02-25 NOTE — ASSESSMENT & PLAN NOTE
"Patient continues to do well and the plan is to continue with the current treatment plan as mentioned below with no changes.    Patient continues to be pending group home placement. Otherwise no clinical significant change.   continues to look for group home/personal-care home placement however this can be significantly impacted by patient no longer having money in the [employer] account and his bank account being over drawn.  Patient is quite altruistic in his desire not to have his friend go to snf however continues to display poor to limited insight regarding his ability to advocate for himself and organize/prioritize his own needs given his own difficult social situation. Would be at significant risk of harm if not discharged to a structured setting with support.    Medication regimen as follows, no changes as of 2/25/2025:  Abilify 5 mg daily as mood adjunct   Zoloft 150 mg daily for depression and anxiety  Continue to encourage participation in group therapy, milieu therapy and occupational therapy.  Continue to assess for side effects of medications.  Continue collaboration with [facility] for medical co-morbidities as indicated.  Continue discussion with CM/SW to assist with obtaining collateral, disposition planning, and the implementation of patient-centered individualized plan of care.  Continue frequent safety checks and vitals per unit protocol.    Risks, benefits and possible side effects of Medications: Risks, benefits, and possible side effects of medications have previously been explained. No new medications at this time.      Legal status: 201    Disposition: to be determined, pending SOAR application for potential group home placement. OT Cognitive Evaluation completed: \"Pt would benefit from discharge to a supportive environment that can provide checks for safety and compliance with IADLs. \"   Although patient's mood has stabilized, they are currently awaiting group home placement.  Their " ability to recognize safety hazards take medications and participate in IADLs are significantly impaired by their cognitive deficits compounded by their chronic mental health needs and would be at risk for significant decompensation without the structure and support of a group home setting.      No associated orders from this encounter found during lookback period of 72 hours.

## 2025-02-25 NOTE — PROGRESS NOTES
02/25/25 0835   Team Meeting   Meeting Type Daily Rounds   Team Members Present   Team Members Present Physician;Nurse;   Physician Team Member Ary   Nursing Team Member [facility] Team Member Noa   Patient/Family Present   Patient Present No   Patient's Family Present No     Pt is medication and meal compliant. Pt denies SI/HI/AVH. Pt is calm and cooperative. Pt's discharge is pending placement.

## 2025-02-25 NOTE — PROGRESS NOTES
Progress Note - Behavioral Health   Name: Franco Roberson 39 y.o. male I MRN: [id number]  Unit/Bed#: -01 I Date of Admission: 5/14/2024   Date of Service: 2/25/2025 I Hospital Day: 287     Assessment & Plan  MDD (major depressive disorder), recurrent severe, without psychosis (HCC)  Patient continues to do well and the plan is to continue with the current treatment plan as mentioned below with no changes.    Patient continues to be pending group home placement. Otherwise no clinical significant change.   continues to look for group home/personal-care home placement however this can be significantly impacted by patient no longer having money in the [employer] account and his bank account being over drawn.  Patient is quite altruistic in his desire not to have his friend go to shelter however continues to display poor to limited insight regarding his ability to advocate for himself and organize/prioritize his own needs given his own difficult social situation. Would be at significant risk of harm if not discharged to a structured setting with support.    Medication regimen as follows, no changes as of 2/25/2025:  Abilify 5 mg daily as mood adjunct   Zoloft 150 mg daily for depression and anxiety  Continue to encourage participation in group therapy, milieu therapy and occupational therapy.  Continue to assess for side effects of medications.  Continue collaboration with PATRICIA for medical co-morbidities as indicated.  Continue discussion with CM/SW to assist with obtaining collateral, disposition planning, and the implementation of patient-centered individualized plan of care.  Continue frequent safety checks and vitals per unit protocol.    Risks, benefits and possible side effects of Medications: Risks, benefits, and possible side effects of medications have previously been explained. No new medications at this time.      Legal status: 201    Disposition: to be determined, pending SOAR application for potential  "group home placement. OT Cognitive Evaluation completed: \"Pt would benefit from discharge to a supportive environment that can provide checks for safety and compliance with IADLs. \"   Although patient's mood has stabilized, they are currently awaiting group home placement.  Their ability to recognize safety hazards take medications and participate in IADLs are significantly impaired by their cognitive deficits compounded by their chronic mental health needs and would be at risk for significant decompensation without the structure and support of a group home setting.      No associated orders from this encounter found during lookback period of 72 hours.  Autism spectrum disorder  Continue supportive care    No associated orders from this encounter found during lookback period of 72 hours.    Hyperlipidemia  - Lipitor 40 mg       Plan     Recommended Treatment:    - Encourage early mobility and having a structured day  - Provide frequent re-orientation, and cognitive stimulation  - Ensure assistive devices are in proper working order (eye-glasses, hearing aids)  - Encourage adequate hydration, nutrition and monitor bowel movements  - Maintain sleep-wake cycle: Uninterrupted sleep time; low-level lighting at night  - Fall precaution  - f/u SLIM recs regarding the medical problems   - Continue medication titration and treatment plan; adjust medication to optimize treatment response and as clinically indicated. .   - Observation: routine            - VS: as per unit protocol  - Diet: Regular diet  - Encourage group attendance and milieu therapy  - Dispo: To be determined     Scheduled medications:  Current Facility-Administered Medications   Medication Dose Route Frequency Provider Last Rate    acetaminophen  650 mg Oral Q6H PRN Basilio Brown MD      acetaminophen  650 mg Oral Q4H PRN Basilio Brown MD      acetaminophen  975 mg Oral Q6H PRN Basilio Brown MD      aluminum-magnesium hydroxide-simethicone  " 30 mL Oral Q4H PRN Basilio Brown MD      ARIPiprazole  5 mg Oral Daily Basilio Panda MD      Artificial Tears  1 drop Both Eyes Q3H PRN Basilio Brown MD      atorvastatin  40 mg Oral Daily With Dinner WALLY Baptiste      benztropine  1 mg Intramuscular Q4H PRN Max 6/day Basilio Brown MD      benztropine  1 mg Oral Q4H PRN Max 6/day Basilio Brown MD      Cholecalciferol  2,500 Units Oral Daily Dustin Mcallister MD      cyanocobalamin  1,000 mcg Oral Daily WALLY Batpiste      Diclofenac Sodium  2 g Topical 4x Daily PRN WALLY Baptiste      hydrOXYzine HCL  50 mg Oral Q6H PRN Max 4/day Basilio Brown MD      Or    diphenhydrAMINE  50 mg Intramuscular Q6H PRN Basilio Brown MD      diphenhydrAMINE-zinc acetate   Topical Daily PRN Raj Guerrero MD      famotidine  20 mg Oral BID WALLY Baptiste      hydrOXYzine HCL  100 mg Oral Q6H PRN Max 4/day Basilio Brown MD      Or    LORazepam  2 mg Intramuscular Q6H PRN Basilio Brown MD      hydrOXYzine HCL  25 mg Oral Q6H PRN Max 4/day Basilio Brown MD      levothyroxine  37.5 mcg Oral Early Morning WALLY Baptiste      melatonin  6 mg Oral HS PRN WALLY Gomez      methocarbamol  500 mg Oral Q6H PRN WALLY Baptiste      OLANZapine  5 mg Oral Q4H PRN Max 3/day Basilio Brown MD      Or    OLANZapine  2.5 mg Intramuscular Q4H PRN Max 3/day Basilio Brown MD      OLANZapine  5 mg Oral Q3H PRN Max 3/day Basilio Brown MD      Or    OLANZapine  5 mg Intramuscular Q3H PRN Max 3/day Basilio Brown MD      OLANZapine  2.5 mg Oral Q4H PRN Max 6/day Basilio Brown MD      polyethylene glycol  17 g Oral Daily PRN Basilio Brown MD      propranolol  10 mg Oral Q8H PRN Basilio Brown MD      senna-docusate sodium  2 tablet Oral After Dinner Dustin Mcallister MD      sertraline  150 mg Oral Daily Rachana Borrego  "WALLY Higgins      traZODone  50 mg Oral HS PRN Basilio Brown MD        PRN:    acetaminophen    acetaminophen    acetaminophen    aluminum-magnesium hydroxide-simethicone    Artificial Tears    benztropine    benztropine    Diclofenac Sodium    hydrOXYzine HCL **OR** diphenhydrAMINE    diphenhydrAMINE-zinc acetate    hydrOXYzine HCL **OR** LORazepam    hydrOXYzine HCL    melatonin    methocarbamol    OLANZapine **OR** OLANZapine    OLANZapine **OR** OLANZapine    OLANZapine    polyethylene glycol    propranolol    traZODone       Subjective     Patient was visited on unit for continuing care; chart reviewed and discussed with multidisciplinary treatment team.  On approach, the patient was calm and cooperative. Denied any changes in mood, appetite, and energy level. No problem initiating and maintaining sleep.  Denied A/VH currently.  Denied SI/HI, intent or plan upon direct inquiry at this time. Pt seen interacting well with select members of the unit.  Walking the floors with him and talking.  Patient denies any acute physical complaints or concerns at this time.    Patient continues to be visible in the milieu and interacts with staff and peers. No reports of aggression or self-injurious behavior on unit. No PRN medications used in the past 24 hours.    Patient accepted all offered medications and no adverse effects of medications noted or reported.    Objective    Current Mental Status Evaluation:  Mental Status Exam  Appearance: casually dressed, consistent with stated age  Motor: no psychomotor retardation, no gait abnormalities  Behavior: cooperative, answers questions appropriately  Speech: soft, normal rhythm  Mood: \"ok\"  Affect: blunted  Thought Process: linear and goal-oriented  Thought Content: denies delusions  Risk Potential: denies suicidal ideation, plan, or intent. Denies homicidal ideation  Perceptions: denies auditory hallucinations, denies visual hallucinations,   Sensorium: Oriented to " person, place, time, and situation  Cognition: cognitive ability appears intact but was not quantitatively tested  Consciousness: alert and awake  Attention: intact, able to focus without difficulty  Insight: limited  Judgement: limited            Vital signs in last 24 hours:    Temp:  [96.8 °F (36 °C)-97.6 °F (36.4 °C)] 96.8 °F (36 °C)  HR:  [59-82] 59  BP: (112-115)/(67-70) 112/67  Resp:  [16] 16  SpO2:  [96 %-98 %] 98 %  O2 Device: None (Room air)    Psychiatric Review of Systems:  Medication adverse effects: none  Sleep: unchanged  Appetite: unchanged  Behaviors over the past 24 hours: unchanged    Laboratory results:    I have personally reviewed all pertinent laboratory/tests results  No results found for this or any previous visit (from the past 48 hours).       Progress Toward Goals & Illness Status:   progressing, attends groups, participates in milieu therapy, mood is stabilizing, discharge planning, placement pending    Patient is not at goal. They are not yet ready for discharge. The patient's condition currently requires active psychopharmacological medication management, interdisciplinary coordination with case management, and the utilization of adjunctive milieu and group therapy to augment psychopharmacological efficacy. The patient's risk of morbidity, and progression or decompensation of psychiatric disease, is higher without this current treatment.     Next of Kin  Extended Emergency Contact Information  Primary Emergency Contact: Lois Roberson  Address: [address] [place] [address] of [place]  Home Phone: [phone]  Relation: Mother    Counseling / Coordination of Care  Patient's progress discussed with staff in treatment team meeting.  Medications, treatment progress and treatment plan reviewed with patient.  Medication education provided to patient.  Educated on importance of medication and treatment compliance.  Supportive therapy provided to  patient.  Cognitive techniques utilized during the session.  Reassurance and supportive therapy provided.  Reoriented to reality and reassured.  Encouraged participation in milieu and group therapy on the unit.  Crisis/safety plan discussed with patient.       Dustin Mcallister MD  Attending Psychiatrist   [facility]      This note has been constructed using a voice recognition system. There may be translation, syntax, or grammatical errors. If you have any questions, please contact the dictating provider.

## 2025-02-25 NOTE — SOCIAL WORK
Cm called Cm called [facility] in [place]. Cm was directed to call [facility] [phone]. Cm left voicemail with call back information.     Cm called Cm called [facility] and left call back information, [facility] [place] [facility] [phone]. Cm left voicemail with call back information.

## 2025-02-25 NOTE — NURSING NOTE
Compliant with morning medications. Cooperative, pleasant. Denies symptoms. Appears depressed during interaction but denies. Visible for his breakfast tray only. Waiting placement.

## 2025-02-26 PROCEDURE — 99232 SBSQ HOSP IP/OBS MODERATE 35: CPT | Performed by: PSYCHIATRY & NEUROLOGY

## 2025-02-26 RX ADMIN — FAMOTIDINE 20 MG: 20 TABLET ORAL at 17:13

## 2025-02-26 RX ADMIN — SENNOSIDES AND DOCUSATE SODIUM 2 TABLET: 50; 8.6 TABLET ORAL at 17:13

## 2025-02-26 RX ADMIN — Medication 2500 UNITS: at 08:28

## 2025-02-26 RX ADMIN — ARIPIPRAZOLE 5 MG: 5 TABLET ORAL at 08:28

## 2025-02-26 RX ADMIN — ATORVASTATIN CALCIUM 40 MG: 40 TABLET, FILM COATED ORAL at 17:13

## 2025-02-26 RX ADMIN — LEVOTHYROXINE SODIUM 37.5 MCG: 0.07 TABLET ORAL at 06:30

## 2025-02-26 RX ADMIN — CYANOCOBALAMIN TAB 1000 MCG 1000 MCG: 1000 TAB at 08:28

## 2025-02-26 RX ADMIN — FAMOTIDINE 20 MG: 20 TABLET ORAL at 08:28

## 2025-02-26 RX ADMIN — SERTRALINE HYDROCHLORIDE 150 MG: 100 TABLET ORAL at 08:28

## 2025-02-26 NOTE — PLAN OF CARE
Problem: Depression  Goal: Treatment Goal: Demonstrate behavioral control of depressive symptoms, verbalize feelings of improved mood/affect, and adopt new coping skills prior to discharge  Outcome: Progressing  Goal: Verbalize thoughts and feelings  Description: Interventions:  - Assess and re-assess patient's level of risk   - Engage patient in 1:1 interactions, daily, for a minimum of 15 minutes   - Encourage patient to express feelings, fears, frustrations, hopes   Outcome: Progressing  Goal: Refrain from harming self  Description: Interventions:  - Monitor patient closely, per order   - Supervise medication ingestion, monitor effects and side effects   Outcome: Progressing  Goal: Refrain from isolation  Description: Interventions:  - Develop a trusting relationship   - Encourage socialization   Outcome: Progressing     Problem: Anxiety  Goal: Anxiety is at manageable level  Description: Interventions:  - Assess and monitor patient's anxiety level.   - Monitor for signs and symptoms (heart palpitations, chest pain, shortness of breath, headaches, nausea, feeling jumpy, restlessness, irritable, apprehensive).   - Collaborate with interdisciplinary team and initiate plan and interventions as ordered.  - [place] patient to unit/surroundings  - Explain treatment plan  - Encourage participation in care  - Encourage verbalization of concerns/fears  - Identify coping mechanisms  - Assist in developing anxiety-reducing skills  - Administer/offer alternative therapies  - Limit or eliminate stimulants  Outcome: Progressing     Problem: DISCHARGE PLANNING - CARE MANAGEMENT  Goal: Discharge to post-acute care or home with appropriate resources  Description: INTERVENTIONS:  - Conduct assessment to determine patient/family and health care team treatment goals, and need for post-acute services based on payer coverage, community resources, and patient preferences, and barriers to discharge  - Address psychosocial, clinical,  and financial barriers to discharge as identified in assessment in conjunction with the patient/family and health care team  - Arrange appropriate level of post-acute services according to patient’s   needs and preference and payer coverage in collaboration with the physician and health care team  - Communicate with and update the patient/family, physician, and health care team regarding progress on the discharge plan  - Arrange appropriate transportation to post-acute venues  Outcome: Progressing     Problem: Knowledge Deficit  Goal: Patient/family/caregiver demonstrates understanding of disease process, treatment plan, medications, and discharge instructions  Description: Complete learning assessment and assess knowledge base.  Interventions:  - Provide teaching at level of understanding  - Provide teaching via preferred learning methods  Outcome: Progressing     Problem: SLEEP DISTURBANCE  Goal: Will exhibit normal sleeping pattern  Description: Interventions:  -  Assess the patients sleep pattern, noting recent changes  - Administer medication as ordered  - Decrease environmental stimuli, including noise, as appropriate during the night  - Encourage the patient to actively participate in unit groups and or exercise during the day to enhance ability to achieve adequate sleep at night  - Assess the patient, in the morning, encouraging a description of sleep experience  Outcome: Progressing

## 2025-02-26 NOTE — NURSING NOTE
Currently denying all symptoms. Compliant with medications. Denies SI/HI, denies hallucinations. Waiting placement.

## 2025-02-26 NOTE — PROGRESS NOTES
Progress Note - Behavioral Health     Franco Robesron 39 y.o. male MRN: [id number]   Unit/Bed#: [facility] 342-01 Encounter: [id number]    Behavior over the last 24 hours: unchanged.     Franco seen today, per staff report has been denying any symptoms.  Remains compliant with medication.  Denies any SI or HI.  Waiting for placement.  Remains visible and attends few groups  on the unit.  I have known this patient from the past and was evaluated by me today.  The patient continues to wait for placement.  He says that he is not having any depressed mood.  He is not having any sleep or appetite problems.  Denies any paranoid thoughts hallucination.  No suicidal thinking is reported.  He says that he is working with a  regarding a placement.  He reports that current medication is working.  He is on Abilify 5 mg daily and Zoloft 150 mg daily.  He participates in some activities.         Sleep: normal  Appetite: normal  Medication side effects: None reported      Mental Status Evaluation:    Appearance:  age appropriate, marginal hygiene   Behavior:  pleasant, cooperative   Speech:  normal rate, normal volume, normal pitch   Mood:  euthymic   Affect:  constricted   Thought Process:  organized, logical, coherent   Associations: intact associations   Thought Content:  no overt delusions   Perceptual Disturbances: no auditory hallucinations, no visual hallucinations   Risk Potential: Suicidal ideation - None at present  Homicidal ideation - None at present   Sensorium:  oriented to person, place, and time/date   Memory:  recent and remote memory grossly intact   Consciousness:  alert and awake   Attention: attention span and concentration are age appropriate   Insight:  limited   Judgment: limited   Gait/Station: normal gait/station   Motor Activity: no abnormal movements     Vital signs in last 24 hours:    Temp:  [97.7 °F (36.5 °C)-98 °F (36.7 °C)] 97.7 °F (36.5 °C)  HR:  [69-81] 69  BP: (120-125)/(68-75) 120/75  Resp:   [17-18] 18  SpO2:  [94 %-96 %] 96 %  O2 Device: None (Room air)    Laboratory results: I have personally reviewed all pertinent laboratory/tests results.        Assessment & Plan   Principal Problem:    MDD (major depressive disorder), recurrent severe, without psychosis (HCC)  Active Problems:    Unspecified depressive disorder (HCC)    Medical clearance for psychiatric admission    Hyperlipidemia    Vitamin D deficiency    B12 deficiency    Hypothyroidism    Autism spectrum disorder    Recommended Treatment:     Planned medication and treatment changes:      All current active medications have been reviewed  Behavioral Health checks for safety monitoring  Continue treatment with group therapy, milieu therapy and occupational therapy  Current Facility-Administered Medications   Medication Dose Route Frequency Provider Last Rate    acetaminophen  650 mg Oral Q6H PRN Basilio Brown MD      acetaminophen  650 mg Oral Q4H PRN Basilio Brown MD      acetaminophen  975 mg Oral Q6H PRN Basilio Brown MD      aluminum-magnesium hydroxide-simethicone  30 mL Oral Q4H PRN Basilio Brown MD      ARIPiprazole  5 mg Oral Daily Basilio Panda MD      Artificial Tears  1 drop Both Eyes Q3H PRN Basilio Brown MD      atorvastatin  40 mg Oral Daily With Dinner WALLY Baptiste      benztropine  1 mg Intramuscular Q4H PRN Max 6/day Basilio Brown MD      benztropine  1 mg Oral Q4H PRN Max 6/day Basiilo Brown MD      Cholecalciferol  2,500 Units Oral Daily Dustin Mcallister MD      cyanocobalamin  1,000 mcg Oral Daily WALLY Baptiste      Diclofenac Sodium  2 g Topical 4x Daily PRN WALLY Baptiste      hydrOXYzine HCL  50 mg Oral Q6H PRN Max 4/day aBsilio Brown MD      Or    diphenhydrAMINE  50 mg Intramuscular Q6H PRN Basilio Brown MD      diphenhydrAMINE-zinc acetate   Topical Daily PRN Raj Guerrero MD      famotidine  20 mg Oral BID Laura  WALLY Olmos      hydrOXYzine HCL  100 mg Oral Q6H PRN Max 4/day Basilio Brown MD      Or    LORazepam  2 mg Intramuscular Q6H PRN Basilio Brown MD      hydrOXYzine HCL  25 mg Oral Q6H PRN Max 4/day Basilio Brown MD      levothyroxine  37.5 mcg Oral Early Morning WALLY Baptiste      melatonin  6 mg Oral HS PRN WALLY Gomez      methocarbamol  500 mg Oral Q6H PRN WALLY Baptiste      OLANZapine  5 mg Oral Q4H PRN Max 3/day Basilio Brown MD      Or    OLANZapine  2.5 mg Intramuscular Q4H PRN Max 3/day Basilio Brown MD      OLANZapine  5 mg Oral Q3H PRN Max 3/day Basilio Brown MD      Or    OLANZapine  5 mg Intramuscular Q3H PRN Max 3/day Basilio Brown MD      OLANZapine  2.5 mg Oral Q4H PRN Max 6/day Basilio Brown MD      polyethylene glycol  17 g Oral Daily PRN Basilio Brown MD      propranolol  10 mg Oral Q8H PRN Basilio Brown MD      senna-docusate sodium  2 tablet Oral After Dinner Dustin Mcallister MD      sertraline  150 mg Oral Daily WALLY Gomez      traZODone  50 mg Oral HS PRN Basilio Brown MD         Risks / Benefits of Treatment:    Risks, benefits, and possible side effects of medications explained to patient and patient verbalizes understanding and agreement for treatment.    Counseling / Coordination of Care:    Total floor / unit time spent today 15 minutes. Greater than 50% of total time was spent with the patient and / or family counseling and / or coordination of care. A description of counseling / coordination of care:  Patient's progress discussed with staff in treatment team meeting.  Medications, treatment progress and treatment plan reviewed with patient.    Raj Guerrero MD 02/26/25

## 2025-02-27 PROCEDURE — 99232 SBSQ HOSP IP/OBS MODERATE 35: CPT | Performed by: PSYCHIATRY & NEUROLOGY

## 2025-02-27 RX ADMIN — ATORVASTATIN CALCIUM 40 MG: 40 TABLET, FILM COATED ORAL at 17:27

## 2025-02-27 RX ADMIN — FAMOTIDINE 20 MG: 20 TABLET ORAL at 17:27

## 2025-02-27 RX ADMIN — CYANOCOBALAMIN TAB 1000 MCG 1000 MCG: 1000 TAB at 08:12

## 2025-02-27 RX ADMIN — SERTRALINE HYDROCHLORIDE 150 MG: 100 TABLET ORAL at 08:12

## 2025-02-27 RX ADMIN — LEVOTHYROXINE SODIUM 37.5 MCG: 0.07 TABLET ORAL at 05:43

## 2025-02-27 RX ADMIN — Medication 2500 UNITS: at 08:12

## 2025-02-27 RX ADMIN — SENNOSIDES AND DOCUSATE SODIUM 2 TABLET: 50; 8.6 TABLET ORAL at 17:27

## 2025-02-27 RX ADMIN — ARIPIPRAZOLE 5 MG: 5 TABLET ORAL at 08:12

## 2025-02-27 RX ADMIN — FAMOTIDINE 20 MG: 20 TABLET ORAL at 08:12

## 2025-02-27 NOTE — PROGRESS NOTES
02/27/25 0838   Team Meeting   Meeting Type Daily Rounds   Team Members Present   Team Members Present Physician;Nurse;   Physician Team Member Cesar   Nursing Team Member [facility] Management Team Member Noa   Patient/Family Present   Patient Present No   Patient's Family Present No     Pt is calm and cooperative. Pt is medication and meal compliant. Pt denies SI/HI/AVH. Pt's discharge is pending placement.

## 2025-02-27 NOTE — PLAN OF CARE
Problem: Ineffective Coping  Goal: Participates in unit activities  Description: Interventions:  - Provide therapeutic environment   - Provide required programming   - Redirect inappropriate behaviors   Outcome: Progressing     Problem: Depression  Goal: Treatment Goal: Demonstrate behavioral control of depressive symptoms, verbalize feelings of improved mood/affect, and adopt new coping skills prior to discharge  Outcome: Progressing  Goal: Verbalize thoughts and feelings  Description: Interventions:  - Assess and re-assess patient's level of risk   - Engage patient in 1:1 interactions, daily, for a minimum of 15 minutes   - Encourage patient to express feelings, fears, frustrations, hopes   Outcome: Progressing  Goal: Refrain from harming self  Description: Interventions:  - Monitor patient closely, per order   - Supervise medication ingestion, monitor effects and side effects   Outcome: Progressing  Goal: Refrain from isolation  Description: Interventions:  - Develop a trusting relationship   - Encourage socialization   Outcome: Progressing     Problem: Anxiety  Goal: Anxiety is at manageable level  Description: Interventions:  - Assess and monitor patient's anxiety level.   - Monitor for signs and symptoms (heart palpitations, chest pain, shortness of breath, headaches, nausea, feeling jumpy, restlessness, irritable, apprehensive).   - Collaborate with interdisciplinary team and initiate plan and interventions as ordered.  - [place] patient to unit/surroundings  - Explain treatment plan  - Encourage participation in care  - Encourage verbalization of concerns/fears  - Identify coping mechanisms  - Assist in developing anxiety-reducing skills  - Administer/offer alternative therapies  - Limit or eliminate stimulants  Outcome: Progressing     Problem: DISCHARGE PLANNING - CARE MANAGEMENT  Goal: Discharge to post-acute care or home with appropriate resources  Description: INTERVENTIONS:  - Conduct assessment to  determine patient/family and health care team treatment goals, and need for post-acute services based on payer coverage, community resources, and patient preferences, and barriers to discharge  - Address psychosocial, clinical, and financial barriers to discharge as identified in assessment in conjunction with the patient/family and health care team  - Arrange appropriate level of post-acute services according to patient’s   needs and preference and payer coverage in collaboration with the physician and health care team  - Communicate with and update the patient/family, physician, and health care team regarding progress on the discharge plan  - Arrange appropriate transportation to post-acute venues  Outcome: Progressing     Problem: Knowledge Deficit  Goal: Patient/family/caregiver demonstrates understanding of disease process, treatment plan, medications, and discharge instructions  Description: Complete learning assessment and assess knowledge base.  Interventions:  - Provide teaching at level of understanding  - Provide teaching via preferred learning methods  Outcome: Progressing     Problem: SLEEP DISTURBANCE  Goal: Will exhibit normal sleeping pattern  Description: Interventions:  -  Assess the patients sleep pattern, noting recent changes  - Administer medication as ordered  - Decrease environmental stimuli, including noise, as appropriate during the night  - Encourage the patient to actively participate in unit groups and or exercise during the day to enhance ability to achieve adequate sleep at night  - Assess the patient, in the morning, encouraging a description of sleep experience  Outcome: Progressing

## 2025-02-27 NOTE — PROGRESS NOTES
Progress Note - Behavioral Health     Franco Roberson 39 y.o. male MRN: [id number]   Unit/Bed#: U 342-01 Encounter: [id number]    Behavior over the last 24 hours: unchanged.     Franco seen today, per staff report has been seclusive on the unit.  His participation in group remains limited.  The patient is waiting for the placement.  He remains cooperative and has been taking his medication.  He is not reporting any symptoms.    The patient reports that he is still sleeping well and is not having any depressed moods or loss of interest motivation.  He tends to isolate and interaction with other peers is minimal.  The patient denies any changes in sleep or appetite.  He is not reporting any thoughts of self-harm or harm to others.  No paranoid thoughts or hallucinations are reported.  He reports that the medication remain effective.         Sleep: normal  Appetite: normal  Medication side effects: None reported at this time.      Mental Status Evaluation:    Appearance:  age appropriate, marginal hygiene   Behavior:  normal, pleasant, cooperative   Speech:  normal rate, normal volume, normal pitch   Mood:  euthymic   Affect:  constricted   Thought Process:  organized, logical, coherent, goal directed   Associations: intact associations   Thought Content:  no overt delusions   Perceptual Disturbances: no auditory hallucinations, no visual hallucinations   Risk Potential: Suicidal ideation - None at present  Homicidal ideation - None at present   Sensorium:  oriented to person, place, and time/date   Memory:  recent and remote memory grossly intact   Consciousness:  alert and awake   Attention: attention span and concentration are age appropriate   Insight:  good   Judgment: good   Gait/Station: normal gait/station   Motor Activity: no abnormal movements     Vital signs in last 24 hours:    Temp:  [96.9 °F (36.1 °C)-97.9 °F (36.6 °C)] 96.9 °F (36.1 °C)  HR:  [77-99] 99  BP: (107-127)/(71-74) 127/74  Resp:  [16] 16  SpO2:   [95 %-97 %] 97 %  O2 Device: None (Room air)    Laboratory results: I have personally reviewed all pertinent laboratory/tests results.        Assessment & Plan   Principal Problem:    MDD (major depressive disorder), recurrent severe, without psychosis (HCC)  Active Problems:    Unspecified depressive disorder (HCC)    Medical clearance for psychiatric admission    Hyperlipidemia    Vitamin D deficiency    B12 deficiency    Hypothyroidism    Autism spectrum disorder    Recommended Treatment:     Planned medication and treatment changes:      All current active medications have been reviewed  Encourage group therapy, milieu therapy and occupational therapy  Behavioral Health checks for safety monitoring  Continue on current medication no changes are recommended at this time  Current Facility-Administered Medications   Medication Dose Route Frequency Provider Last Rate    acetaminophen  650 mg Oral Q6H PRN Basilio Brown MD      acetaminophen  650 mg Oral Q4H PRN Basilio Brown MD      acetaminophen  975 mg Oral Q6H PRN Basilio Brown MD      aluminum-magnesium hydroxide-simethicone  30 mL Oral Q4H PRN Basilio Brown MD      ARIPiprazole  5 mg Oral Daily Basilio Panda MD      Artificial Tears  1 drop Both Eyes Q3H PRN Basilio Brown MD      atorvastatin  40 mg Oral Daily With Dinner WALLY Baptiste      benztropine  1 mg Intramuscular Q4H PRN Max 6/day Basilio Brown MD      benztropine  1 mg Oral Q4H PRN Max 6/day Basilio Brown MD      Cholecalciferol  2,500 Units Oral Daily Dustin Mcallister MD      cyanocobalamin  1,000 mcg Oral Daily WALLY Baptiste      Diclofenac Sodium  2 g Topical 4x Daily PRN WALLY Baptiste      hydrOXYzine HCL  50 mg Oral Q6H PRN Max 4/day Basilio Brown MD      Or    diphenhydrAMINE  50 mg Intramuscular Q6H PRN Basilio Brown MD      diphenhydrAMINE-zinc acetate   Topical Daily PRN Raj Guerrero MD       famotidine  20 mg Oral BID WALLY Baptiste      hydrOXYzine HCL  100 mg Oral Q6H PRN Max 4/day Basilio Brown MD      Or    LORazepam  2 mg Intramuscular Q6H PRN Basilio Brown MD      hydrOXYzine HCL  25 mg Oral Q6H PRN Max 4/day Basilio Brown MD      levothyroxine  37.5 mcg Oral Early Morning WALLY Baptiste      melatonin  6 mg Oral HS PRN WALLY Gomez      methocarbamol  500 mg Oral Q6H PRN Laura Ashford, WALLY      OLANZapine  5 mg Oral Q4H PRN Max 3/day Basilio Brown MD      Or    OLANZapine  2.5 mg Intramuscular Q4H PRN Max 3/day Basilio Brown MD      OLANZapine  5 mg Oral Q3H PRN Max 3/day Basilio Brown MD      Or    OLANZapine  5 mg Intramuscular Q3H PRN Max 3/day Basilio Brown MD      OLANZapine  2.5 mg Oral Q4H PRN Max 6/day Basilio Brown MD      polyethylene glycol  17 g Oral Daily PRN Basilio Brown MD      propranolol  10 mg Oral Q8H PRN Basilio Brown MD      senna-docusate sodium  2 tablet Oral After Dinner Dustin Mcallister MD      sertraline  150 mg Oral Daily WALLY Gomez      traZODone  50 mg Oral HS PRN Basilio Brown MD         Risks / Benefits of Treatment:    Risks, benefits, and possible side effects of medications explained to patient and patient verbalizes understanding and agreement for treatment.    Counseling / Coordination of Care:    Total floor / unit time spent today 15 minutes. Greater than 50% of total time was spent with the patient and / or family counseling and / or coordination of care. A description of counseling / coordination of care:  Patient's progress discussed with staff in treatment team meeting.  Encouraged patient to participate in group therapy.  Discussed about the motivation.  He stated that he will try to attend group in the afternoon.    Raj Guerrero MD 02/27/25

## 2025-02-27 NOTE — NURSING NOTE
Patient is calm and cooperative upon approach. Patient isolative to self and room. Patient denies SI/HI/AH/VH. Patient is compliant with meds and meals.

## 2025-02-27 NOTE — NURSING NOTE
Patient has been seclusive to his room. Only out for his breakfast tray. Reports sleeping well last night. Cooperative. Denies symptoms. Waiting placement.

## 2025-02-28 PROCEDURE — 99232 SBSQ HOSP IP/OBS MODERATE 35: CPT | Performed by: PSYCHIATRY & NEUROLOGY

## 2025-02-28 RX ADMIN — SENNOSIDES AND DOCUSATE SODIUM 2 TABLET: 50; 8.6 TABLET ORAL at 17:18

## 2025-02-28 RX ADMIN — ATORVASTATIN CALCIUM 40 MG: 40 TABLET, FILM COATED ORAL at 17:18

## 2025-02-28 RX ADMIN — SERTRALINE HYDROCHLORIDE 150 MG: 100 TABLET ORAL at 08:09

## 2025-02-28 RX ADMIN — FAMOTIDINE 20 MG: 20 TABLET ORAL at 08:09

## 2025-02-28 RX ADMIN — ARIPIPRAZOLE 5 MG: 5 TABLET ORAL at 08:08

## 2025-02-28 RX ADMIN — LEVOTHYROXINE SODIUM 37.5 MCG: 0.07 TABLET ORAL at 06:51

## 2025-02-28 RX ADMIN — Medication 2500 UNITS: at 08:08

## 2025-02-28 RX ADMIN — FAMOTIDINE 20 MG: 20 TABLET ORAL at 17:18

## 2025-02-28 RX ADMIN — CYANOCOBALAMIN TAB 1000 MCG 1000 MCG: 1000 TAB at 08:08

## 2025-02-28 NOTE — PLAN OF CARE
Problem: Depression  Goal: Verbalize thoughts and feelings  Description: Interventions:  - Assess and re-assess patient's level of risk   - Engage patient in 1:1 interactions, daily, for a minimum of 15 minutes   - Encourage patient to express feelings, fears, frustrations, hopes   Outcome: Progressing  Goal: Refrain from harming self  Description: Interventions:  - Monitor patient closely, per order   - Supervise medication ingestion, monitor effects and side effects   Outcome: Progressing     Problem: Anxiety  Goal: Anxiety is at manageable level  Description: Interventions:  - Assess and monitor patient's anxiety level.   - Monitor for signs and symptoms (heart palpitations, chest pain, shortness of breath, headaches, nausea, feeling jumpy, restlessness, irritable, apprehensive).   - Collaborate with interdisciplinary team and initiate plan and interventions as ordered.  - [place] patient to unit/surroundings  - Explain treatment plan  - Encourage participation in care  - Encourage verbalization of concerns/fears  - Identify coping mechanisms  - Assist in developing anxiety-reducing skills  - Administer/offer alternative therapies  - Limit or eliminate stimulants  Outcome: Progressing     Problem: DISCHARGE PLANNING - CARE MANAGEMENT  Goal: Discharge to post-acute care or home with appropriate resources  Description: INTERVENTIONS:  - Conduct assessment to determine patient/family and health care team treatment goals, and need for post-acute services based on payer coverage, community resources, and patient preferences, and barriers to discharge  - Address psychosocial, clinical, and financial barriers to discharge as identified in assessment in conjunction with the patient/family and health care team  - Arrange appropriate level of post-acute services according to patient’s   needs and preference and payer coverage in collaboration with the physician and health care team  - Communicate with and update the  patient/family, physician, and health care team regarding progress on the discharge plan  - Arrange appropriate transportation to post-acute venues  Outcome: Progressing     Problem: SLEEP DISTURBANCE  Goal: Will exhibit normal sleeping pattern  Description: Interventions:  -  Assess the patients sleep pattern, noting recent changes  - Administer medication as ordered  - Decrease environmental stimuli, including noise, as appropriate during the night  - Encourage the patient to actively participate in unit groups and or exercise during the day to enhance ability to achieve adequate sleep at night  - Assess the patient, in the morning, encouraging a description of sleep experience  Outcome: Progressing     Problem: Depression  Goal: Treatment Goal: Demonstrate behavioral control of depressive symptoms, verbalize feelings of improved mood/affect, and adopt new coping skills prior to discharge  Outcome: Not Progressing  Goal: Refrain from isolation  Description: Interventions:  - Develop a trusting relationship   - Encourage socialization   Outcome: Not Progressing     Problem: Knowledge Deficit  Goal: Patient/family/caregiver demonstrates understanding of disease process, treatment plan, medications, and discharge instructions  Description: Complete learning assessment and assess knowledge base.  Interventions:  - Provide teaching at level of understanding  - Provide teaching via preferred learning methods  Outcome: Not Progressing

## 2025-02-28 NOTE — NURSING NOTE
Patient secluded to room, appears to be withdrawn ,disheveled and depressed. Patient denies Depression SI/HI/AV/VH,compliant with medications and routine vitals.

## 2025-02-28 NOTE — PROGRESS NOTES
Progress Note - Behavioral Health     Franco Roberson 39 y.o. male MRN: [id number]   Unit/Bed#: U 342-01 Encounter: [id number]    Behavior over the last 24 hours: unchanged.     Franco seen today, per staff report has been pleasant calm and cooperative on the unit.  He denies any symptoms or complaints.  Remains compliant with medication and meals.  Denies any SI/HI/AVH.  Discharge is pending placement  I examined the patient.  He stated that he was trying to take a nap.  He reported that he did have his breakfast this morning.  Denied that he was having any sleep issues last night.  He says that he has been doing well without any depressed moods or loss of interest motivation.  Says that medication has been helpful.  He is not reporting any current symptoms.  Participation in group activities remains minimal and tends to isolate himself.         Sleep: normal  Appetite: normal  Medication side effects: None reported      Mental Status Evaluation:    Appearance:  age appropriate, marginal hygiene   Behavior:  pleasant, cooperative, calm   Speech:  normal rate, normal volume, normal pitch   Mood:  euthymic   Affect:  constricted   Thought Process:  organized, logical, coherent, goal directed   Associations: intact associations   Thought Content:  no overt delusions   Perceptual Disturbances: no auditory hallucinations, no visual hallucinations   Risk Potential: Suicidal ideation - None at present  Homicidal ideation - None at present   Sensorium:  oriented to person, place, and time/date   Memory:  recent and remote memory grossly intact   Consciousness:  alert and awake   Attention: attention span and concentration are age appropriate   Insight:  good   Judgment: good   Gait/Station: normal gait/station   Motor Activity: no abnormal movements     Vital signs in last 24 hours:    Temp:  [97.1 °F (36.2 °C)-97.9 °F (36.6 °C)] 97.9 °F (36.6 °C)  HR:  [62-88] 62  BP: (101-126)/(67-74) 101/67  Resp:  [16-17] 16  SpO2:  [95  %-97 %] 97 %  O2 Device: None (Room air)    Laboratory results: I have personally reviewed all pertinent laboratory/tests results.        Assessment & Plan   Principal Problem:    MDD (major depressive disorder), recurrent severe, without psychosis (HCC)  Active Problems:    Unspecified depressive disorder (HCC)    Medical clearance for psychiatric admission    Hyperlipidemia    Vitamin D deficiency    B12 deficiency    Hypothyroidism    Autism spectrum disorder    Recommended Treatment:     Planned medication and treatment changes:  No changes in medication are recommended at this time.  The patient continues to remain stable at current dosage.    All current active medications have been reviewed  Encourage group therapy, milieu therapy and occupational therapy  Behavioral Health checks for safety monitoring  Current Facility-Administered Medications   Medication Dose Route Frequency Provider Last Rate    acetaminophen  650 mg Oral Q6H PRN Basilio Brown MD      acetaminophen  650 mg Oral Q4H PRN Basilio Brown MD      acetaminophen  975 mg Oral Q6H PRN Basilio Brown MD      aluminum-magnesium hydroxide-simethicone  30 mL Oral Q4H PRN Basilio Brown MD      ARIPiprazole  5 mg Oral Daily Basilio Panda MD      Artificial Tears  1 drop Both Eyes Q3H PRN Basilio Brown MD      atorvastatin  40 mg Oral Daily With Dinner WALLY Baptiste      benztropine  1 mg Intramuscular Q4H PRN Max 6/day Basilio Brown MD      benztropine  1 mg Oral Q4H PRN Max 6/day Basilio Brown MD      Cholecalciferol  2,500 Units Oral Daily Dustin Mcallister MD      cyanocobalamin  1,000 mcg Oral Daily WALLY Baptiste      Diclofenac Sodium  2 g Topical 4x Daily PRN WALLY Baptiste      hydrOXYzine HCL  50 mg Oral Q6H PRN Max 4/day Basilio Brown MD      Or    diphenhydrAMINE  50 mg Intramuscular Q6H PRN Basilio Brown MD      diphenhydrAMINE-zinc acetate    Topical Daily PRN Raj Guerrero MD      famotidine  20 mg Oral BID WALLY Baptiste      hydrOXYzine HCL  100 mg Oral Q6H PRN Max 4/day Basilio Brown MD      Or    LORazepam  2 mg Intramuscular Q6H PRN Basilio Brown MD      hydrOXYzine HCL  25 mg Oral Q6H PRN Max 4/day Basilio Brown MD      levothyroxine  37.5 mcg Oral Early Morning WALLY Baptiste      melatonin  6 mg Oral HS PRN WALLY Gomez      methocarbamol  500 mg Oral Q6H PRN WALLY Baptiste      OLANZapine  5 mg Oral Q4H PRN Max 3/day Basilio Brown MD      Or    OLANZapine  2.5 mg Intramuscular Q4H PRN Max 3/day Basilio Brown MD      OLANZapine  5 mg Oral Q3H PRN Max 3/day Basilio Brown MD      Or    OLANZapine  5 mg Intramuscular Q3H PRN Max 3/day Basilio Brown MD      OLANZapine  2.5 mg Oral Q4H PRN Max 6/day Basilio Brown MD      polyethylene glycol  17 g Oral Daily PRN Basilio Brown MD      propranolol  10 mg Oral Q8H PRN Basilio Brown MD      senna-docusate sodium  2 tablet Oral After Dinner Dustin Mcallister MD      sertraline  150 mg Oral Daily WALLY Gomez      traZODone  50 mg Oral HS PRN Basilio Brown MD         Risks / Benefits of Treatment:    Risks, benefits, and possible side effects of medications explained to patient and patient verbalizes understanding and agreement for treatment.    Counseling / Coordination of Care:    Total floor / unit time spent today 15 minutes. Greater than 50% of total time was spent with the patient and / or family counseling and / or coordination of care. A description of counseling / coordination of care:  Patient's progress discussed with staff in treatment team meeting.  Advised to attend more groups.  Educated him about the importance of participation in different group activities.    Raj Guerrero MD 02/28/25

## 2025-02-28 NOTE — NURSING NOTE
Pleasant, calm and cooperative. Reports sleeping well last night. Denies symptoms. Compliant with medications and meals.

## 2025-03-01 PROCEDURE — 99232 SBSQ HOSP IP/OBS MODERATE 35: CPT | Performed by: STUDENT IN AN ORGANIZED HEALTH CARE EDUCATION/TRAINING PROGRAM

## 2025-03-01 RX ADMIN — SERTRALINE HYDROCHLORIDE 150 MG: 100 TABLET ORAL at 08:15

## 2025-03-01 RX ADMIN — SENNOSIDES AND DOCUSATE SODIUM 2 TABLET: 50; 8.6 TABLET ORAL at 17:29

## 2025-03-01 RX ADMIN — FAMOTIDINE 20 MG: 20 TABLET ORAL at 17:29

## 2025-03-01 RX ADMIN — FAMOTIDINE 20 MG: 20 TABLET ORAL at 08:15

## 2025-03-01 RX ADMIN — ARIPIPRAZOLE 5 MG: 5 TABLET ORAL at 08:15

## 2025-03-01 RX ADMIN — ATORVASTATIN CALCIUM 40 MG: 40 TABLET, FILM COATED ORAL at 17:29

## 2025-03-01 RX ADMIN — Medication 2500 UNITS: at 08:15

## 2025-03-01 RX ADMIN — CYANOCOBALAMIN TAB 1000 MCG 1000 MCG: 1000 TAB at 08:15

## 2025-03-01 RX ADMIN — LEVOTHYROXINE SODIUM 37.5 MCG: 0.07 TABLET ORAL at 06:47

## 2025-03-01 NOTE — PROGRESS NOTES
Progress Note - Behavioral Health   Name: Franco Roberson 39 y.o. male I MRN: [id number]  Unit/Bed#: -01 I Date of Admission: 5/14/2024   Date of Service: 3/1/2025 I Hospital Day: 291    Assessment & Plan  MDD (major depressive disorder), recurrent severe, without psychosis (HCC)    Patient continues to do well and the plan is to continue with the current treatment plan as mentioned below with no changes.    Patient continues to be pending group home placement. Otherwise no clinical significant change.   continues to look for group home/personal-care home placement however this can be significantly impacted by patient no longer having money in the [employer] account and his bank account being over drawn.  Patient is quite altruistic in his desire not to have his friend go to [facility] however continues to display poor to limited insight regarding his ability to advocate for himself and organize/prioritize his own needs given his own difficult social situation. Would be at significant risk of harm if not discharged to a structured setting with support.    Medication regimen as follows, no changes as of 3/1/2025:  Abilify 5 mg daily as mood adjunct   Zoloft 150 mg daily for depression and anxiety  Continue to encourage participation in group therapy, milieu therapy and occupational therapy.  Continue to assess for side effects of medications.  Continue collaboration with PATRICIA for medical co-morbidities as indicated.  Continue discussion with CM/SW to assist with obtaining collateral, disposition planning, and the implementation of patient-centered individualized plan of care.  Continue frequent safety checks and vitals per unit protocol.    Risks, benefits and possible side effects of Medications: Risks, benefits, and possible side effects of medications have previously been explained. No new medications at this time.      Legal status: 201    Disposition: to be determined, pending SOAR application for potential  "group home placement. OT Cognitive Evaluation completed: \"Pt would benefit from discharge to a supportive environment that can provide checks for safety and compliance with IADLs. \"   Although patient's mood has stabilized, they are currently awaiting group home placement.  Their ability to recognize safety hazards take medications and participate in IADLs are significantly impaired by their cognitive deficits compounded by their chronic mental health needs and would be at risk for significant decompensation without the structure and support of a group home setting.      No associated orders from this encounter found during lookback period of 72 hours.  Autism spectrum disorder  Continue supportive care    No associated orders from this encounter found during lookback period of 72 hours.    Hyperlipidemia  - Lipitor 40 mg         ------------------------------------------------------------      Recommended Treatment Plan:   Behavioral checks every 15 minutes  Encourage participation in group therapy, milieu therapy and occupational therapy.  Assess for side effects of medications.  Collaboration with PATRICIA for medical co-morbidities as indicated.  Continue discussion with CM/SW to assist with obtaining collateral, disposition planning, and the implementation of patient-centered individualized plan of care.  Estimated Discharge Date: 3/14/2025      Risks / Benefits of Treatment:    Risks, benefits, and possible side effects of medications explained to patient and patient verbalizes understanding and agreement for treatment.    Subjective: Patient's chart was reviewed. Patient's progress and plan was discussed with treatment team. Per nursing report, Franco has been calm, pleasant, cooperative on the unit. He remains compliant with medications.     Behavioral PRN medications over the past 24 hours: none    Franco was evaluated this morning for continuity of care. On examination, Franco is pleasant, cooperative. He " "states his mood is \" pretty good.\" He reports sleeping well. His appetite has been good. He denies adverse effects from medications. He denies active or passive suicidal ideation and homicidal ideation. He denies auditory or visual hallucinations.  He states that he plans on resting today.  He states that he does not go to groups on the weekends.  No other concerns or complaints at by patient.    Psychiatric Review of Systems:  Behavior over the last 24 hours: unchanged  Sleep: normal  Appetite: adequate  Medication side effects: none verbalized  Medical ROS: Complete review of systems is negative except as noted above.    Vital signs in last 24 hours:  Temp:  [97 °F (36.1 °C)-97.4 °F (36.3 °C)] 97 °F (36.1 °C)  HR:  [65-97] 65  BP: (107-131)/(67-69) 107/67  Resp:  [16] 16  SpO2:  [95 %-96 %] 95 %  O2 Device: None (Room air)    VS: Reviewed, within normal limits    Mental Status Exam:    Appearance:  marginal grooming and hygiene, disheveled , casually dressed, appears stated age, and overweight   Behavior:  calm, cooperative, sitting comfortably, and good eye contact   Speech:  spontaneous, slow, soft, and coherent   Mood:  \"Pretty good\"   Affect:  constricted, smiling appropriately    Thought Process:  organized, goal directed, concrete   Thought Content:  no verbalized delusions or overt paranoia   Perceptual Disturbances: no reported hallucinations and does not appear to be responding to internal stimuli at this time   Risk Potential: Suicidal ideation -  Denies at present   Homicidal ideation - Denies at present  Potential for aggression - No   Sensorium:  oriented to person, place, and time/date   Memory:  recent and remote memory grossly intact   Consciousness:  alert and awake   Attention/Concentration: attention span and concentration are age appropriate   Insight:  fair   Judgment: fair   Gait/Station: in bed   Motor Activity: no abnormal movements       Behavioral Health Medications: all current active " meds have been reviewed. Changes as in plan section above.    Current Medications:  Current Facility-Administered Medications   Medication Dose Route Frequency Provider Last Rate    acetaminophen  650 mg Oral Q6H PRN Basilio Brown MD      acetaminophen  650 mg Oral Q4H PRN Basilio Brown MD      acetaminophen  975 mg Oral Q6H PRN Basilio Brown MD      aluminum-magnesium hydroxide-simethicone  30 mL Oral Q4H PRN Basilio Brown MD      ARIPiprazole  5 mg Oral Daily Basilio Panda MD      Artificial Tears  1 drop Both Eyes Q3H PRN Basilio Brown MD      atorvastatin  40 mg Oral Daily With Dinner WALLY Baptiste      benztropine  1 mg Intramuscular Q4H PRN Max 6/day Basilio Brown MD      benztropine  1 mg Oral Q4H PRN Max 6/day Basilio Brown MD      Cholecalciferol  2,500 Units Oral Daily Dustin Mcallister MD      cyanocobalamin  1,000 mcg Oral Daily WALLY Baptiste      Diclofenac Sodium  2 g Topical 4x Daily PRN WALLY Baptiste      hydrOXYzine HCL  50 mg Oral Q6H PRN Max 4/day Basilio Brown MD      Or    diphenhydrAMINE  50 mg Intramuscular Q6H PRN Basilio Brown MD      diphenhydrAMINE-zinc acetate   Topical Daily PRN Raj Guerrero MD      famotidine  20 mg Oral BID WALLY Baptiste      hydrOXYzine HCL  100 mg Oral Q6H PRN Max 4/day Basilio Brown MD      Or    LORazepam  2 mg Intramuscular Q6H PRN Basilio Brown MD      hydrOXYzine HCL  25 mg Oral Q6H PRN Max 4/day Basilio Brown MD      levothyroxine  37.5 mcg Oral Early Morning WALLY Baptiste      melatonin  6 mg Oral HS PRN WALLY Gomez      methocarbamol  500 mg Oral Q6H PRN WALLY Baptiste      OLANZapine  5 mg Oral Q4H PRN Max 3/day Basilio Brown MD      Or    OLANZapine  2.5 mg Intramuscular Q4H PRN Max 3/day Basilio Brown MD      OLANZapine  5 mg Oral Q3H PRN Max 3/day Basilio Garcia  MD Karyn      Or    OLANZapine  5 mg Intramuscular Q3H PRN Max 3/day Basilio Brown MD      OLANZapine  2.5 mg Oral Q4H PRN Max 6/day Basilio Brown MD      polyethylene glycol  17 g Oral Daily PRN Basilio Brown MD      propranolol  10 mg Oral Q8H PRN Basilio Brown MD      senna-docusate sodium  2 tablet Oral After Dinner Dustin Mcallister MD      sertraline  150 mg Oral Daily WALLY Gomez      traZODone  50 mg Oral HS PRN Basilio Brown MD         Laboratory results:  I have personally reviewed all pertinent laboratory/tests results.   No results found for this or any previous visit (from the past 48 hours).     Progress Toward Goals: progressing slowly    Counseling / Coordination of Care:  Patient's progress discussed with staff in treatment team meeting.  Medication changes reviewed with nursing staff.  Medication changes reviewed with staff in treatment team meeting.  Patient's progress reviewed with nursing staff.      Crys Plasencia DO 03/01/25  Psychiatry Residency, PGY-2  This note has been constructed using a voice recognition system. There may be translation, syntax, or grammatical errors. If you have any questions, please contact the dictating author.

## 2025-03-01 NOTE — NURSING NOTE
Patient visible on the unit, appears to be withdrawn to self. Patient enjoys talking on the phone and attended group. Patient denies depression SI/HI/AH/VH at this time. Compliant with medications and routine vitals.

## 2025-03-01 NOTE — NURSING NOTE
Patient is in the supine position.   The body was positioned using the following devices: safety strap.  The right arm was positioned using the following devices: arm board.   Pt denies SI/HI/AH/VH. Present in milieu but is mostly isolative to his room. Isolative to self. Medication and meal compliant. Pt is calm and pleasant. Compliant with Lunch. No further concerns as of present. Plan of care ongoing.

## 2025-03-01 NOTE — ASSESSMENT & PLAN NOTE
"  Patient continues to do well and the plan is to continue with the current treatment plan as mentioned below with no changes.    Patient continues to be pending group home placement. Otherwise no clinical significant change.   continues to look for group home/personal-care home placement however this can be significantly impacted by patient no longer having money in the [employer] account and his bank account being over drawn.  Patient is quite altruistic in his desire not to have his friend go to long-term however continues to display poor to limited insight regarding his ability to advocate for himself and organize/prioritize his own needs given his own difficult social situation. Would be at significant risk of harm if not discharged to a structured setting with support.    Medication regimen as follows, no changes as of 3/1/2025:  Abilify 5 mg daily as mood adjunct   Zoloft 150 mg daily for depression and anxiety  Continue to encourage participation in group therapy, milieu therapy and occupational therapy.  Continue to assess for side effects of medications.  Continue collaboration with SLIM for medical co-morbidities as indicated.  Continue discussion with CM/SW to assist with obtaining collateral, disposition planning, and the implementation of patient-centered individualized plan of care.  Continue frequent safety checks and vitals per unit protocol.    Risks, benefits and possible side effects of Medications: Risks, benefits, and possible side effects of medications have previously been explained. No new medications at this time.      Legal status: 201    Disposition: to be determined, pending SOAR application for potential group home placement. OT Cognitive Evaluation completed: \"Pt would benefit from discharge to a supportive environment that can provide checks for safety and compliance with IADLs. \"   Although patient's mood has stabilized, they are currently awaiting group home placement.  Their " ability to recognize safety hazards take medications and participate in IADLs are significantly impaired by their cognitive deficits compounded by their chronic mental health needs and would be at risk for significant decompensation without the structure and support of a group home setting.      No associated orders from this encounter found during lookback period of 72 hours.

## 2025-03-01 NOTE — PLAN OF CARE
Problem: Ineffective Coping  Goal: Participates in unit activities  Description: Interventions:  - Provide therapeutic environment   - Provide required programming   - Redirect inappropriate behaviors   Outcome: Progressing     Problem: Depression  Goal: Treatment Goal: Demonstrate behavioral control of depressive symptoms, verbalize feelings of improved mood/affect, and adopt new coping skills prior to discharge  Outcome: Progressing  Goal: Verbalize thoughts and feelings  Description: Interventions:  - Assess and re-assess patient's level of risk   - Engage patient in 1:1 interactions, daily, for a minimum of 15 minutes   - Encourage patient to express feelings, fears, frustrations, hopes   Outcome: Progressing  Goal: Refrain from harming self  Description: Interventions:  - Monitor patient closely, per order   - Supervise medication ingestion, monitor effects and side effects   Outcome: Progressing  Goal: Refrain from isolation  Description: Interventions:  - Develop a trusting relationship   - Encourage socialization   Outcome: Progressing     Problem: Anxiety  Goal: Anxiety is at manageable level  Description: Interventions:  - Assess and monitor patient's anxiety level.   - Monitor for signs and symptoms (heart palpitations, chest pain, shortness of breath, headaches, nausea, feeling jumpy, restlessness, irritable, apprehensive).   - Collaborate with interdisciplinary team and initiate plan and interventions as ordered.  - [place] patient to unit/surroundings  - Explain treatment plan  - Encourage participation in care  - Encourage verbalization of concerns/fears  - Identify coping mechanisms  - Assist in developing anxiety-reducing skills  - Administer/offer alternative therapies  - Limit or eliminate stimulants  Outcome: Progressing     Problem: DISCHARGE PLANNING - CARE MANAGEMENT  Goal: Discharge to post-acute care or home with appropriate resources  Description: INTERVENTIONS:  - Conduct assessment to  determine patient/family and health care team treatment goals, and need for post-acute services based on payer coverage, community resources, and patient preferences, and barriers to discharge  - Address psychosocial, clinical, and financial barriers to discharge as identified in assessment in conjunction with the patient/family and health care team  - Arrange appropriate level of post-acute services according to patient’s   needs and preference and payer coverage in collaboration with the physician and health care team  - Communicate with and update the patient/family, physician, and health care team regarding progress on the discharge plan  - Arrange appropriate transportation to post-acute venues  Outcome: Progressing     Problem: Knowledge Deficit  Goal: Patient/family/caregiver demonstrates understanding of disease process, treatment plan, medications, and discharge instructions  Description: Complete learning assessment and assess knowledge base.  Interventions:  - Provide teaching at level of understanding  - Provide teaching via preferred learning methods  Outcome: Progressing     Problem: SLEEP DISTURBANCE  Goal: Will exhibit normal sleeping pattern  Description: Interventions:  -  Assess the patients sleep pattern, noting recent changes  - Administer medication as ordered  - Decrease environmental stimuli, including noise, as appropriate during the night  - Encourage the patient to actively participate in unit groups and or exercise during the day to enhance ability to achieve adequate sleep at night  - Assess the patient, in the morning, encouraging a description of sleep experience  Outcome: Progressing

## 2025-03-02 PROCEDURE — 99232 SBSQ HOSP IP/OBS MODERATE 35: CPT | Performed by: STUDENT IN AN ORGANIZED HEALTH CARE EDUCATION/TRAINING PROGRAM

## 2025-03-02 RX ADMIN — FAMOTIDINE 20 MG: 20 TABLET ORAL at 09:06

## 2025-03-02 RX ADMIN — FAMOTIDINE 20 MG: 20 TABLET ORAL at 17:13

## 2025-03-02 RX ADMIN — SERTRALINE HYDROCHLORIDE 150 MG: 100 TABLET ORAL at 09:05

## 2025-03-02 RX ADMIN — SENNOSIDES AND DOCUSATE SODIUM 2 TABLET: 50; 8.6 TABLET ORAL at 17:13

## 2025-03-02 RX ADMIN — ATORVASTATIN CALCIUM 40 MG: 40 TABLET, FILM COATED ORAL at 17:13

## 2025-03-02 RX ADMIN — CYANOCOBALAMIN TAB 1000 MCG 1000 MCG: 1000 TAB at 09:05

## 2025-03-02 RX ADMIN — ARIPIPRAZOLE 5 MG: 5 TABLET ORAL at 09:06

## 2025-03-02 RX ADMIN — LEVOTHYROXINE SODIUM 37.5 MCG: 0.07 TABLET ORAL at 06:30

## 2025-03-02 RX ADMIN — Medication 2500 UNITS: at 09:05

## 2025-03-02 NOTE — ASSESSMENT & PLAN NOTE
"  Patient continues to do well and the plan is to continue with the current treatment plan as mentioned below with no changes.    Patient continues to be pending group home placement. Otherwise no clinical significant change.   continues to look for group home/personal-care home placement however this can be significantly impacted by patient no longer having money in the [employer] account and his bank account being over drawn.  Patient is quite altruistic in his desire not to have his friend go to custodial however continues to display poor to limited insight regarding his ability to advocate for himself and organize/prioritize his own needs given his own difficult social situation. Would be at significant risk of harm if not discharged to a structured setting with support.    Medication regimen as follows, no changes as of 3/2/2025:  Abilify 5 mg daily as mood adjunct   Zoloft 150 mg daily for depression and anxiety  Continue to encourage participation in group therapy, milieu therapy and occupational therapy.  Continue to assess for side effects of medications.  Continue collaboration with SLIM for medical co-morbidities as indicated.  Continue discussion with CM/SW to assist with obtaining collateral, disposition planning, and the implementation of patient-centered individualized plan of care.  Continue frequent safety checks and vitals per unit protocol.    Risks, benefits and possible side effects of Medications: Risks, benefits, and possible side effects of medications have previously been explained. No new medications at this time.      Legal status: 201    Disposition: to be determined, pending SOAR application for potential group home placement. OT Cognitive Evaluation completed: \"Pt would benefit from discharge to a supportive environment that can provide checks for safety and compliance with IADLs. \"   Although patient's mood has stabilized, they are currently awaiting group home placement.  Their " ability to recognize safety hazards take medications and participate in IADLs are significantly impaired by their cognitive deficits compounded by their chronic mental health needs and would be at risk for significant decompensation without the structure and support of a group home setting.      No associated orders from this encounter found during lookback period of 72 hours.

## 2025-03-02 NOTE — PLAN OF CARE
Problem: Ineffective Coping  Goal: Participates in unit activities  Description: Interventions:  - Provide therapeutic environment   - Provide required programming   - Redirect inappropriate behaviors   Outcome: Progressing     Problem: Depression  Goal: Treatment Goal: Demonstrate behavioral control of depressive symptoms, verbalize feelings of improved mood/affect, and adopt new coping skills prior to discharge  Outcome: Progressing  Goal: Verbalize thoughts and feelings  Description: Interventions:  - Assess and re-assess patient's level of risk   - Engage patient in 1:1 interactions, daily, for a minimum of 15 minutes   - Encourage patient to express feelings, fears, frustrations, hopes   Outcome: Progressing  Goal: Refrain from harming self  Description: Interventions:  - Monitor patient closely, per order   - Supervise medication ingestion, monitor effects and side effects   Outcome: Progressing  Goal: Refrain from isolation  Description: Interventions:  - Develop a trusting relationship   - Encourage socialization   Outcome: Progressing     Problem: Anxiety  Goal: Anxiety is at manageable level  Description: Interventions:  - Assess and monitor patient's anxiety level.   - Monitor for signs and symptoms (heart palpitations, chest pain, shortness of breath, headaches, nausea, feeling jumpy, restlessness, irritable, apprehensive).   - Collaborate with interdisciplinary team and initiate plan and interventions as ordered.  - [place] patient to unit/surroundings  - Explain treatment plan  - Encourage participation in care  - Encourage verbalization of concerns/fears  - Identify coping mechanisms  - Assist in developing anxiety-reducing skills  - Administer/offer alternative therapies  - Limit or eliminate stimulants  Outcome: Progressing     Problem: DISCHARGE PLANNING - CARE MANAGEMENT  Goal: Discharge to post-acute care or home with appropriate resources  Description: INTERVENTIONS:  - Conduct assessment to  determine patient/family and health care team treatment goals, and need for post-acute services based on payer coverage, community resources, and patient preferences, and barriers to discharge  - Address psychosocial, clinical, and financial barriers to discharge as identified in assessment in conjunction with the patient/family and health care team  - Arrange appropriate level of post-acute services according to patient’s   needs and preference and payer coverage in collaboration with the physician and health care team  - Communicate with and update the patient/family, physician, and health care team regarding progress on the discharge plan  - Arrange appropriate transportation to post-acute venues  Outcome: Progressing     Problem: Knowledge Deficit  Goal: Patient/family/caregiver demonstrates understanding of disease process, treatment plan, medications, and discharge instructions  Description: Complete learning assessment and assess knowledge base.  Interventions:  - Provide teaching at level of understanding  - Provide teaching via preferred learning methods  Outcome: Progressing     Problem: SLEEP DISTURBANCE  Goal: Will exhibit normal sleeping pattern  Description: Interventions:  -  Assess the patients sleep pattern, noting recent changes  - Administer medication as ordered  - Decrease environmental stimuli, including noise, as appropriate during the night  - Encourage the patient to actively participate in unit groups and or exercise during the day to enhance ability to achieve adequate sleep at night  - Assess the patient, in the morning, encouraging a description of sleep experience  Outcome: Progressing

## 2025-03-02 NOTE — NURSING NOTE
Patient was resting in bed and responded to his name being called.  Patient denies SI/HI and A/V hallucinations.  He was med/meal compliant.

## 2025-03-02 NOTE — NURSING NOTE
Patient secluded to room ,appears to withdrawn to self. Patient encouraged to attend group with no success. Denies depression, SI/HI/AH/VH at this time. Compliant with medications and routine vitals.

## 2025-03-02 NOTE — PROGRESS NOTES
Progress Note - Behavioral Health   Name: Franco Roberson 39 y.o. male I MRN: [id number]  Unit/Bed#: -01 I Date of Admission: 5/14/2024   Date of Service: 3/2/2025 I Hospital Day: 292    Assessment & Plan  MDD (major depressive disorder), recurrent severe, without psychosis (HCC)    Patient continues to do well and the plan is to continue with the current treatment plan as mentioned below with no changes.    Patient continues to be pending group home placement. Otherwise no clinical significant change.   continues to look for group home/personal-care home placement however this can be significantly impacted by patient no longer having money in the [employer] account and his bank account being over drawn.  Patient is quite altruistic in his desire not to have his friend go to long term however continues to display poor to limited insight regarding his ability to advocate for himself and organize/prioritize his own needs given his own difficult social situation. Would be at significant risk of harm if not discharged to a structured setting with support.    Medication regimen as follows, no changes as of 3/2/2025:  Abilify 5 mg daily as mood adjunct   Zoloft 150 mg daily for depression and anxiety  Continue to encourage participation in group therapy, milieu therapy and occupational therapy.  Continue to assess for side effects of medications.  Continue collaboration with PATRICIA for medical co-morbidities as indicated.  Continue discussion with CM/SW to assist with obtaining collateral, disposition planning, and the implementation of patient-centered individualized plan of care.  Continue frequent safety checks and vitals per unit protocol.    Risks, benefits and possible side effects of Medications: Risks, benefits, and possible side effects of medications have previously been explained. No new medications at this time.      Legal status: 201    Disposition: to be determined, pending SOAR application for potential  "group home placement. OT Cognitive Evaluation completed: \"Pt would benefit from discharge to a supportive environment that can provide checks for safety and compliance with IADLs. \"   Although patient's mood has stabilized, they are currently awaiting group home placement.  Their ability to recognize safety hazards take medications and participate in IADLs are significantly impaired by their cognitive deficits compounded by their chronic mental health needs and would be at risk for significant decompensation without the structure and support of a group home setting.      No associated orders from this encounter found during lookback period of 72 hours.  Autism spectrum disorder  Continue supportive care    No associated orders from this encounter found during lookback period of 72 hours.    Hyperlipidemia  - Lipitor 40 mg         ------------------------------------------------------------      Recommended Treatment Plan:   Behavioral checks every 15 minutes  Encourage participation in group therapy, milieu therapy and occupational therapy.  Assess for side effects of medications.  Collaboration with PATRICIA for medical co-morbidities as indicated.  Continue discussion with CM/SW to assist with obtaining collateral, disposition planning, and the implementation of patient-centered individualized plan of care.  Estimated Discharge Date: 3/14/2025        Risks / Benefits of Treatment:    Risks, benefits, and possible side effects of medications explained to patient and patient verbalizes understanding and agreement for treatment.    Subjective: Patient's chart was reviewed. Patient's progress and plan was discussed with treatment team. Per nursing report, Franco has been present in milieu, but mostly isolative to his room, calm and pleasant on the unit. He remains compliant with medications.     Behavioral PRN medications over the past 24 hours: None    Franco was evaluated this morning for continuity of care. On " "examination, Franco is pleasant, calm, cooperative, blunted affect. He states his mood is \" pretty good.\" He reports sleeping well. His appetite has been adequate. He denies adverse effects from medications. He denies active or passive suicidal ideation and homicidal ideation. He denies auditory or visual hallucinations.  Patient states that he is resting for the weekend and we will return going to groups back on Monday.  He denies any other concerns or complaints.    Psychiatric Review of Systems:  Behavior over the last 24 hours: unchanged  Sleep: normal  Appetite: adequate  Medication side effects: none verbalized  Medical ROS: Complete review of systems is negative except as noted above.    Vital signs in last 24 hours:  Temp:  [97.4 °F (36.3 °C)-97.6 °F (36.4 °C)] 97.6 °F (36.4 °C)  HR:  [64-71] 64  BP: (111-121)/(68-69) 111/69  Resp:  [16] 16  SpO2:  [96 %-98 %] 98 %  O2 Device: None (Room air)    VS: Reviewed, within normal limits    Mental Status Exam:    Appearance:  marginal grooming and hygiene, casually dressed, appears older than stated age, and overweight   Behavior:  calm, cooperative, sitting comfortably, and fair eye contact   Speech:  slow, soft, and scant   Mood:  \"Pretty good\"   Affect:  blunted   Thought Process:  organized, logical, concrete   Thought Content:  no verbalized delusions or overt paranoia   Perceptual Disturbances: no reported hallucinations and does not appear to be responding to internal stimuli at this time   Risk Potential: Suicidal ideation -  Denies at present   Homicidal ideation - Denies at present  Potential for aggression - No   Sensorium:  oriented to person, place, and time/date   Memory:  recent and remote memory grossly intact   Consciousness:  alert and awake   Attention/Concentration: attention span and concentration are age appropriate   Insight:  fair   Judgment: fair   Gait/Station: normal gait/station, normal balance   Motor Activity: no abnormal movements "       Behavioral Health Medications: all current active meds have been reviewed. Changes as in plan section above.    Current Medications:  Current Facility-Administered Medications   Medication Dose Route Frequency Provider Last Rate    acetaminophen  650 mg Oral Q6H PRN Basilio Brown MD      acetaminophen  650 mg Oral Q4H PRN Basilio Brown MD      acetaminophen  975 mg Oral Q6H PRN Basilio Brown MD      aluminum-magnesium hydroxide-simethicone  30 mL Oral Q4H PRN Basilio Brown MD      ARIPiprazole  5 mg Oral Daily Basilio Panda MD      Artificial Tears  1 drop Both Eyes Q3H PRN Basilio Brown MD      atorvastatin  40 mg Oral Daily With Dinner WALLY Baptiste      benztropine  1 mg Intramuscular Q4H PRN Max 6/day Basilio Brown MD      benztropine  1 mg Oral Q4H PRN Max 6/day Basilio Brown MD      Cholecalciferol  2,500 Units Oral Daily Dustin Mcallister MD      cyanocobalamin  1,000 mcg Oral Daily WALLY Baptiste      Diclofenac Sodium  2 g Topical 4x Daily PRN WALLY Baptiste      hydrOXYzine HCL  50 mg Oral Q6H PRN Max 4/day Basilio Brown MD      Or    diphenhydrAMINE  50 mg Intramuscular Q6H PRN Basilio Brown MD      diphenhydrAMINE-zinc acetate   Topical Daily PRN Raj Guerrero MD      famotidine  20 mg Oral BID WALLY Baptiste      hydrOXYzine HCL  100 mg Oral Q6H PRN Max 4/day Basilio Brown MD      Or    LORazepam  2 mg Intramuscular Q6H PRN Basilio Brown MD      hydrOXYzine HCL  25 mg Oral Q6H PRN Max 4/day Basilio Brown MD      levothyroxine  37.5 mcg Oral Early Morning WALLY Baptiste      melatonin  6 mg Oral HS PRN WALLY Gomez      methocarbamol  500 mg Oral Q6H PRN WALLY Baptiste      OLANZapine  5 mg Oral Q4H PRN Max 3/day Basilio Brown MD      Or    OLANZapine  2.5 mg Intramuscular Q4H PRN Max 3/day Basilio Brown MD       OLANZapine  5 mg Oral Q3H PRN Max 3/day Basilio Brown MD      Or    OLANZapine  5 mg Intramuscular Q3H PRN Max 3/day Basilio Brown MD      OLANZapine  2.5 mg Oral Q4H PRN Max 6/day Basilio Brown MD      polyethylene glycol  17 g Oral Daily PRN Basilio Brown MD      propranolol  10 mg Oral Q8H PRN Basilio Brown MD      senna-docusate sodium  2 tablet Oral After Dinner Dustin Mcallister MD      sertraline  150 mg Oral Daily WALLY Gomez      traZODone  50 mg Oral HS PRN Basilio Brown MD         Laboratory results:  I have personally reviewed all pertinent laboratory/tests results.   No results found for this or any previous visit (from the past 48 hours).     Progress Toward Goals: progressing slowly    Counseling / Coordination of Care:  Patient's progress discussed with staff in treatment team meeting.  Medication changes reviewed with nursing staff.  Medication changes reviewed with staff in treatment team meeting.  Patient's progress reviewed with nursing staff.      Crys Plasencia DO 03/02/25  Psychiatry Residency, PGY-2  This note has been constructed using a voice recognition system. There may be translation, syntax, or grammatical errors. If you have any questions, please contact the dictating author.

## 2025-03-03 PROCEDURE — 99232 SBSQ HOSP IP/OBS MODERATE 35: CPT | Performed by: PSYCHIATRY & NEUROLOGY

## 2025-03-03 RX ADMIN — Medication 2500 UNITS: at 08:12

## 2025-03-03 RX ADMIN — LEVOTHYROXINE SODIUM 37.5 MCG: 0.07 TABLET ORAL at 06:30

## 2025-03-03 RX ADMIN — ATORVASTATIN CALCIUM 40 MG: 40 TABLET, FILM COATED ORAL at 17:28

## 2025-03-03 RX ADMIN — SENNOSIDES AND DOCUSATE SODIUM 2 TABLET: 50; 8.6 TABLET ORAL at 17:28

## 2025-03-03 RX ADMIN — ARIPIPRAZOLE 5 MG: 5 TABLET ORAL at 08:12

## 2025-03-03 RX ADMIN — FAMOTIDINE 20 MG: 20 TABLET ORAL at 08:12

## 2025-03-03 RX ADMIN — SERTRALINE HYDROCHLORIDE 150 MG: 100 TABLET ORAL at 08:12

## 2025-03-03 RX ADMIN — FAMOTIDINE 20 MG: 20 TABLET ORAL at 17:28

## 2025-03-03 RX ADMIN — CYANOCOBALAMIN TAB 1000 MCG 1000 MCG: 1000 TAB at 08:12

## 2025-03-03 NOTE — NURSING NOTE
Patient has been mainly seclusive to his room. Out for meal trays. Denies symptoms. Pleasant. Waiting placement.

## 2025-03-03 NOTE — PROGRESS NOTES
Progress Note - Behavioral Health     Franco Roberson 39 y.o. male MRN: [id number]   Unit/Bed#: [facility] 342-01 Encounter: [id number]    Behavior over the last 24 hours: unchanged.     Franco seen today, per staff report has been med compliant and visible pleasant calm and cooperative on the unit.  Discharge is pending placement.    The patient reports that he has been doing well.  He says that he has been doing some walking.  He also says that he has been attending group and was reading his problems that he has been writing.  He reports no current symptoms.  He says that his sleep appetite remains good and denies any depressed moods or loss of interest motivation.  No hallucination paranoid thoughts are reported.  He is not reporting any thoughts of self-harm or harm to others.  No elated moods or grandiose thoughts are reported.         Sleep: normal  Appetite: normal  Medication side effects: None reported      Mental Status Evaluation:    Appearance:  age appropriate, casually dressed, marginal hygiene   Behavior:  pleasant, cooperative, calm   Speech:  normal rate, normal volume, normal pitch   Mood:  euthymic   Affect:  normal range and intensity   Thought Process:  logical, coherent, goal directed   Associations: intact associations   Thought Content:  no overt delusions   Perceptual Disturbances: no auditory hallucinations, no visual hallucinations   Risk Potential: Suicidal ideation - None at present  Homicidal ideation - None at present   Sensorium:  oriented to person, place, and time/date   Memory:  recent and remote memory grossly intact   Consciousness:  alert and awake   Attention: attention span and concentration are age appropriate   Insight:  good   Judgment: good   Gait/Station: normal gait/station   Motor Activity: no abnormal movements     Vital signs in last 24 hours:    Temp:  [97.5 °F (36.4 °C)-97.6 °F (36.4 °C)] 97.6 °F (36.4 °C)  HR:  [64-83] 64  BP: (108-114)/(64-68) 108/64  Resp:  [16] 16  SpO2:   [97 %] 97 %  O2 Device: None (Room air)    Laboratory results: I have personally reviewed all pertinent laboratory/tests results.        Assessment & Plan   Principal Problem:    MDD (major depressive disorder), recurrent severe, without psychosis (HCC)  Active Problems:    Unspecified depressive disorder (HCC)    Medical clearance for psychiatric admission    Hyperlipidemia    Vitamin D deficiency    B12 deficiency    Hypothyroidism    Autism spectrum disorder    Recommended Treatment:     Planned medication and treatment changes:  No medication changes are recommended at this time.    All current active medications have been reviewed  Encourage group therapy, milieu therapy and occupational therapy  Behavioral Health checks for safety monitoring  Current Facility-Administered Medications   Medication Dose Route Frequency Provider Last Rate    acetaminophen  650 mg Oral Q6H PRN Basilio Brown MD      acetaminophen  650 mg Oral Q4H PRN Basilio Brown MD      acetaminophen  975 mg Oral Q6H PRN Basilio Brown MD      aluminum-magnesium hydroxide-simethicone  30 mL Oral Q4H PRN Basilio Brown MD      ARIPiprazole  5 mg Oral Daily Basilio Panda MD      Artificial Tears  1 drop Both Eyes Q3H PRN Basilio Brown MD      atorvastatin  40 mg Oral Daily With Dinner WALLY Baptiste      benztropine  1 mg Intramuscular Q4H PRN Max 6/day Basilio Brown MD      benztropine  1 mg Oral Q4H PRN Max 6/day Basilio Brown MD      Cholecalciferol  2,500 Units Oral Daily Dustin Mcallister MD      cyanocobalamin  1,000 mcg Oral Daily WALLY Baptiste      Diclofenac Sodium  2 g Topical 4x Daily PRN WALLY Baptiste      hydrOXYzine HCL  50 mg Oral Q6H PRN Max 4/day Basilio Brown MD      Or    diphenhydrAMINE  50 mg Intramuscular Q6H PRN Basilio Brown MD      diphenhydrAMINE-zinc acetate   Topical Daily PRN Raj Guerrero MD      famotidine  20 mg Oral  BID WALLY Baptiste      hydrOXYzine HCL  100 mg Oral Q6H PRN Max 4/day Basilio Brown MD      Or    LORazepam  2 mg Intramuscular Q6H PRN Basilio Brown MD      hydrOXYzine HCL  25 mg Oral Q6H PRN Max 4/day Basilio Brown MD      levothyroxine  37.5 mcg Oral Early Morning WALLY Baptiste      melatonin  6 mg Oral HS PRN WALLY Gomez      methocarbamol  500 mg Oral Q6H PRN WALLY Baptiste      OLANZapine  5 mg Oral Q4H PRN Max 3/day Basilio Brown MD      Or    OLANZapine  2.5 mg Intramuscular Q4H PRN Max 3/day Basilio Brown MD      OLANZapine  5 mg Oral Q3H PRN Max 3/day Basilio Brown MD      Or    OLANZapine  5 mg Intramuscular Q3H PRN Max 3/day Basilio Brown MD      OLANZapine  2.5 mg Oral Q4H PRN Max 6/day Basilio Brown MD      polyethylene glycol  17 g Oral Daily PRN Basilio Brown MD      propranolol  10 mg Oral Q8H PRN Basilio Brown MD      senna-docusate sodium  2 tablet Oral After Dinner Dustin Mcallister MD      sertraline  150 mg Oral Daily WALLY Gomez      traZODone  50 mg Oral HS PRN Basilio Brown MD         Risks / Benefits of Treatment:    Risks, benefits, and possible side effects of medications explained to patient and patient verbalizes understanding and agreement for treatment.    Counseling / Coordination of Care:    Total floor / unit time spent today 15 minutes. Greater than 50% of total time was spent with the patient and / or family counseling and / or coordination of care. A description of counseling / coordination of care:  Patient's progress discussed with staff in treatment team meeting.  Encouraged increased participation in the group activities.    Raj Guerrero MD 03/03/25

## 2025-03-03 NOTE — NURSING NOTE
Patient visible for short periods of time, and talking on the phone. Patient appears to be in better spirits. Denies depression SI/HI/AH/VH at this time. Compliant with medications and routine vitals.

## 2025-03-03 NOTE — PLAN OF CARE
Problem: Ineffective Coping  Goal: Participates in unit activities  Description: Interventions:  - Provide therapeutic environment   - Provide required programming   - Redirect inappropriate behaviors   Outcome: Progressing     Problem: Depression  Goal: Treatment Goal: Demonstrate behavioral control of depressive symptoms, verbalize feelings of improved mood/affect, and adopt new coping skills prior to discharge  Outcome: Progressing  Goal: Verbalize thoughts and feelings  Description: Interventions:  - Assess and re-assess patient's level of risk   - Engage patient in 1:1 interactions, daily, for a minimum of 15 minutes   - Encourage patient to express feelings, fears, frustrations, hopes   Outcome: Progressing  Goal: Refrain from harming self  Description: Interventions:  - Monitor patient closely, per order   - Supervise medication ingestion, monitor effects and side effects   Outcome: Progressing  Goal: Refrain from isolation  Description: Interventions:  - Develop a trusting relationship   - Encourage socialization   Outcome: Progressing     Problem: Anxiety  Goal: Anxiety is at manageable level  Description: Interventions:  - Assess and monitor patient's anxiety level.   - Monitor for signs and symptoms (heart palpitations, chest pain, shortness of breath, headaches, nausea, feeling jumpy, restlessness, irritable, apprehensive).   - Collaborate with interdisciplinary team and initiate plan and interventions as ordered.  - [place] patient to unit/surroundings  - Explain treatment plan  - Encourage participation in care  - Encourage verbalization of concerns/fears  - Identify coping mechanisms  - Assist in developing anxiety-reducing skills  - Administer/offer alternative therapies  - Limit or eliminate stimulants  Outcome: Progressing     Problem: DISCHARGE PLANNING - CARE MANAGEMENT  Goal: Discharge to post-acute care or home with appropriate resources  Description: INTERVENTIONS:  - Conduct assessment to  determine patient/family and health care team treatment goals, and need for post-acute services based on payer coverage, community resources, and patient preferences, and barriers to discharge  - Address psychosocial, clinical, and financial barriers to discharge as identified in assessment in conjunction with the patient/family and health care team  - Arrange appropriate level of post-acute services according to patient’s   needs and preference and payer coverage in collaboration with the physician and health care team  - Communicate with and update the patient/family, physician, and health care team regarding progress on the discharge plan  - Arrange appropriate transportation to post-acute venues  Outcome: Progressing     Problem: Knowledge Deficit  Goal: Patient/family/caregiver demonstrates understanding of disease process, treatment plan, medications, and discharge instructions  Description: Complete learning assessment and assess knowledge base.  Interventions:  - Provide teaching at level of understanding  - Provide teaching via preferred learning methods  Outcome: Progressing     Problem: SLEEP DISTURBANCE  Goal: Will exhibit normal sleeping pattern  Description: Interventions:  -  Assess the patients sleep pattern, noting recent changes  - Administer medication as ordered  - Decrease environmental stimuli, including noise, as appropriate during the night  - Encourage the patient to actively participate in unit groups and or exercise during the day to enhance ability to achieve adequate sleep at night  - Assess the patient, in the morning, encouraging a description of sleep experience  Outcome: Progressing

## 2025-03-03 NOTE — PROGRESS NOTES
03/03/25 0844   Team Meeting   Meeting Type Daily Rounds   Team Members Present   Team Members Present Physician;Nurse;   Physician Team Member Cesar   [facility] Suhas   Patient/Family Present   Patient Present No   Patient's Family Present No     Pt med/meal compliant. Visible on the unit. Pleasant, calm, cooperative. Discharge pending placement.

## 2025-03-04 PROCEDURE — 99232 SBSQ HOSP IP/OBS MODERATE 35: CPT | Performed by: PSYCHIATRY & NEUROLOGY

## 2025-03-04 RX ADMIN — Medication 2500 UNITS: at 08:16

## 2025-03-04 RX ADMIN — LEVOTHYROXINE SODIUM 37.5 MCG: 0.07 TABLET ORAL at 06:18

## 2025-03-04 RX ADMIN — FAMOTIDINE 20 MG: 20 TABLET ORAL at 08:16

## 2025-03-04 RX ADMIN — ATORVASTATIN CALCIUM 40 MG: 40 TABLET, FILM COATED ORAL at 17:00

## 2025-03-04 RX ADMIN — SENNOSIDES AND DOCUSATE SODIUM 2 TABLET: 50; 8.6 TABLET ORAL at 18:20

## 2025-03-04 RX ADMIN — CYANOCOBALAMIN TAB 1000 MCG 1000 MCG: 1000 TAB at 08:16

## 2025-03-04 RX ADMIN — FAMOTIDINE 20 MG: 20 TABLET ORAL at 18:20

## 2025-03-04 RX ADMIN — ARIPIPRAZOLE 5 MG: 5 TABLET ORAL at 08:16

## 2025-03-04 RX ADMIN — SERTRALINE HYDROCHLORIDE 150 MG: 100 TABLET ORAL at 08:16

## 2025-03-04 NOTE — NURSING NOTE
Patient isolative to self and room. Patient is visible on unit, for meals, but remains isolative to self. Patient calm and cooperative upon approach. Patient denies SI/HI/AH/VH. Patient compliant with meds and meals.

## 2025-03-04 NOTE — PROGRESS NOTES
03/04/25 0845   Team Meeting   Meeting Type Daily Rounds   Team Members Present   Team Members Present Physician;Nurse;   Physician Team Member Ary   [facility] Suhas   Patient/Family Present   Patient Present No   Patient's Family Present No     Pt med/meal compliant. Visible on the unit. Discharge pending placement.

## 2025-03-04 NOTE — PLAN OF CARE
Problem: Ineffective Coping  Goal: Participates in unit activities  Description: Interventions:  - Provide therapeutic environment   - Provide required programming   - Redirect inappropriate behaviors   Outcome: Progressing     Problem: Depression  Goal: Treatment Goal: Demonstrate behavioral control of depressive symptoms, verbalize feelings of improved mood/affect, and adopt new coping skills prior to discharge  Outcome: Progressing  Goal: Verbalize thoughts and feelings  Description: Interventions:  - Assess and re-assess patient's level of risk   - Engage patient in 1:1 interactions, daily, for a minimum of 15 minutes   - Encourage patient to express feelings, fears, frustrations, hopes   Outcome: Progressing  Goal: Refrain from harming self  Description: Interventions:  - Monitor patient closely, per order   - Supervise medication ingestion, monitor effects and side effects   Outcome: Progressing  Goal: Refrain from isolation  Description: Interventions:  - Develop a trusting relationship   - Encourage socialization   Outcome: Progressing     Problem: Anxiety  Goal: Anxiety is at manageable level  Description: Interventions:  - Assess and monitor patient's anxiety level.   - Monitor for signs and symptoms (heart palpitations, chest pain, shortness of breath, headaches, nausea, feeling jumpy, restlessness, irritable, apprehensive).   - Collaborate with interdisciplinary team and initiate plan and interventions as ordered.  - [place] patient to unit/surroundings  - Explain treatment plan  - Encourage participation in care  - Encourage verbalization of concerns/fears  - Identify coping mechanisms  - Assist in developing anxiety-reducing skills  - Administer/offer alternative therapies  - Limit or eliminate stimulants  Outcome: Progressing     Problem: DISCHARGE PLANNING - CARE MANAGEMENT  Goal: Discharge to post-acute care or home with appropriate resources  Description: INTERVENTIONS:  - Conduct assessment to  determine patient/family and health care team treatment goals, and need for post-acute services based on payer coverage, community resources, and patient preferences, and barriers to discharge  - Address psychosocial, clinical, and financial barriers to discharge as identified in assessment in conjunction with the patient/family and health care team  - Arrange appropriate level of post-acute services according to patient’s   needs and preference and payer coverage in collaboration with the physician and health care team  - Communicate with and update the patient/family, physician, and health care team regarding progress on the discharge plan  - Arrange appropriate transportation to post-acute venues  Outcome: Progressing     Problem: Knowledge Deficit  Goal: Patient/family/caregiver demonstrates understanding of disease process, treatment plan, medications, and discharge instructions  Description: Complete learning assessment and assess knowledge base.  Interventions:  - Provide teaching at level of understanding  - Provide teaching via preferred learning methods  Outcome: Progressing     Problem: SLEEP DISTURBANCE  Goal: Will exhibit normal sleeping pattern  Description: Interventions:  -  Assess the patients sleep pattern, noting recent changes  - Administer medication as ordered  - Decrease environmental stimuli, including noise, as appropriate during the night  - Encourage the patient to actively participate in unit groups and or exercise during the day to enhance ability to achieve adequate sleep at night  - Assess the patient, in the morning, encouraging a description of sleep experience  Outcome: Progressing

## 2025-03-04 NOTE — PROGRESS NOTES
Progress Note - Behavioral Health   Name: Franco Roberson 39 y.o. male I MRN: [id number]  Unit/Bed#: -01 I Date of Admission: 5/14/2024   Date of Service: 3/4/2025 I Hospital Day: 294     Assessment & Plan  MDD (major depressive disorder), recurrent severe, without psychosis (HCC)    Patient continues to do well and the plan is to continue with the current treatment plan as mentioned below with no changes.    Patient continues to be pending group home placement. Otherwise no clinical significant change.   continues to look for group home/personal-care home placement however this can be significantly impacted by patient no longer having money in the [employer] account and his bank account being over drawn.  Patient is quite altruistic in his desire not to have his friend go to senior care however continues to display poor to limited insight regarding his ability to advocate for himself and organize/prioritize his own needs given his own difficult social situation. Would be at significant risk of harm if not discharged to a structured setting with support.    Medication regimen as follows, no changes as of 3/4/2025:  Abilify 5 mg daily as mood adjunct   Zoloft 150 mg daily for depression and anxiety  Continue to encourage participation in group therapy, milieu therapy and occupational therapy.  Continue to assess for side effects of medications.  Continue collaboration with PATRICIA for medical co-morbidities as indicated.  Continue discussion with CM/SW to assist with obtaining collateral, disposition planning, and the implementation of patient-centered individualized plan of care.  Continue frequent safety checks and vitals per unit protocol.    Risks, benefits and possible side effects of Medications: Risks, benefits, and possible side effects of medications have previously been explained. No new medications at this time.      Legal status: 201    Disposition: to be determined, pending SOAR application for potential  "group home placement. OT Cognitive Evaluation completed: \"Pt would benefit from discharge to a supportive environment that can provide checks for safety and compliance with IADLs. \"   Although patient's mood has stabilized, they are currently awaiting group home placement.  Their ability to recognize safety hazards take medications and participate in IADLs are significantly impaired by their cognitive deficits compounded by their chronic mental health needs and would be at risk for significant decompensation without the structure and support of a group home setting.      No associated orders from this encounter found during lookback period of 72 hours.  Autism spectrum disorder  Continue supportive care    No associated orders from this encounter found during lookback period of 72 hours.    Hyperlipidemia  - Lipitor 40 mg       Plan     Recommended Treatment:    - Encourage early mobility and having a structured day  - Provide frequent re-orientation, and cognitive stimulation  - Ensure assistive devices are in proper working order (eye-glasses, hearing aids)  - Encourage adequate hydration, nutrition and monitor bowel movements  - Maintain sleep-wake cycle: Uninterrupted sleep time; low-level lighting at night  - Fall precaution  - f/u [facility] recs regarding the medical problems   - Continue medication titration and treatment plan; adjust medication to optimize treatment response and as clinically indicated. .   - Observation: routine            - VS: as per unit protocol  - Diet: Regular diet  - Encourage group attendance and milieu therapy  - Dispo: To be determined     Scheduled medications:  Current Facility-Administered Medications   Medication Dose Route Frequency Provider Last Rate    acetaminophen  650 mg Oral Q6H PRN Basilio Brown MD      acetaminophen  650 mg Oral Q4H PRN Basilio Brown MD      acetaminophen  975 mg Oral Q6H PRN Basilio Brown MD      aluminum-magnesium hydroxide-simethicone  " 30 mL Oral Q4H PRN Basilio Brown MD      ARIPiprazole  5 mg Oral Daily Basilio Panda MD      Artificial Tears  1 drop Both Eyes Q3H PRN Basilio Brown MD      atorvastatin  40 mg Oral Daily With Dinner WALLY Baptiste      benztropine  1 mg Intramuscular Q4H PRN Max 6/day Basilio Brown MD      benztropine  1 mg Oral Q4H PRN Max 6/day Basilio Brown MD      Cholecalciferol  2,500 Units Oral Daily Dustin Mcallister MD      cyanocobalamin  1,000 mcg Oral Daily WALLY Baptiste      Diclofenac Sodium  2 g Topical 4x Daily PRN WALLY Baptiste      hydrOXYzine HCL  50 mg Oral Q6H PRN Max 4/day Basilio Brown MD      Or    diphenhydrAMINE  50 mg Intramuscular Q6H PRN Basilio Brown MD      diphenhydrAMINE-zinc acetate   Topical Daily PRN Raj Guerrero MD      famotidine  20 mg Oral BID WALLY Baptiste      hydrOXYzine HCL  100 mg Oral Q6H PRN Max 4/day Basilio Brown MD      Or    LORazepam  2 mg Intramuscular Q6H PRN Basilio Brown MD      hydrOXYzine HCL  25 mg Oral Q6H PRN Max 4/day Basilio Brown MD      levothyroxine  37.5 mcg Oral Early Morning WALLY Baptiste      melatonin  6 mg Oral HS PRN WALLY Gomez      methocarbamol  500 mg Oral Q6H PRN WALLY Baptiste      OLANZapine  5 mg Oral Q4H PRN Max 3/day Basilio Brown MD      Or    OLANZapine  2.5 mg Intramuscular Q4H PRN Max 3/day Basilio Brown MD      OLANZapine  5 mg Oral Q3H PRN Max 3/day Basilio Brown MD      Or    OLANZapine  5 mg Intramuscular Q3H PRN Max 3/day Basilio Brown MD      OLANZapine  2.5 mg Oral Q4H PRN Max 6/day Basilio Brown MD      polyethylene glycol  17 g Oral Daily PRN Basilio Brown MD      propranolol  10 mg Oral Q8H PRN Basilio Brown MD      senna-docusate sodium  2 tablet Oral After Dinner Dustin Mcallister MD      sertraline  150 mg Oral Daily Rachana Borrego  "WALLY Higgins      traZODone  50 mg Oral HS PRN Basilio Brown MD        PRN:    acetaminophen    acetaminophen    acetaminophen    aluminum-magnesium hydroxide-simethicone    Artificial Tears    benztropine    benztropine    Diclofenac Sodium    hydrOXYzine HCL **OR** diphenhydrAMINE    diphenhydrAMINE-zinc acetate    hydrOXYzine HCL **OR** LORazepam    hydrOXYzine HCL    melatonin    methocarbamol    OLANZapine **OR** OLANZapine    OLANZapine **OR** OLANZapine    OLANZapine    polyethylene glycol    propranolol    traZODone       Subjective     Patient was visited on unit for continuing care; chart reviewed and discussed with multidisciplinary treatment team.  On approach, the patient was calm and cooperative. Denied any changes in mood, appetite, and energy level. No problem initiating and maintaining sleep.  Denied A/VH currently.  Denied SI/HI, intent or plan upon direct inquiry at this time.    Patient continues to be visible in the milieu and interacts with staff and peers. No reports of aggression or self-injurious behavior on unit. No PRN medications used in the past 24 hours.    Patient accepted all offered medications and no adverse effects of medications noted or reported.    Objective    Current Mental Status Evaluation:  Mental Status Exam  Appearance: casually dressed, consistent with stated age  Motor: no psychomotor retardation, no gait abnormalities  Behavior: cooperative, answers questions appropriately  Speech: soft, normal rhythm  Mood: \"ok\"  Affect: blunted  Thought Process: concrete  Thought Content: denies delusions  Risk Potential: denies suicidal ideation, plan, or intent. Denies homicidal ideation  Perceptions: denies auditory hallucinations, denies visual hallucinations,   Sensorium: Oriented to person, place, time, and situation  Cognition: cognitive ability appears intact but was not quantitatively tested  Consciousness: alert and awake  Attention: intact, able to focus without " difficulty  Insight: limited  Judgement: limited            Vital signs in last 24 hours:    Temp:  [97.3 °F (36.3 °C)-97.5 °F (36.4 °C)] 97.5 °F (36.4 °C)  HR:  [68-85] 68  BP: (106-115)/(67-70) 115/67  Resp:  [16] 16  SpO2:  [96 %-100 %] 100 %  O2 Device: None (Room air)    Psychiatric Review of Systems:  Medication adverse effects: none  Sleep: unchanged  Appetite: unchanged  Behaviors over the past 24 hours: unchanged    Laboratory results:    I have personally reviewed all pertinent laboratory/tests results  No results found for this or any previous visit (from the past 48 hours).       Progress Toward Goals & Illness Status:   progressing, attends groups, participates in milieu therapy, discharge planning, placement pending    Patient is not at goal. They are not yet ready for discharge. The patient's condition currently requires active psychopharmacological medication management, interdisciplinary coordination with case management, and the utilization of adjunctive milieu and group therapy to augment psychopharmacological efficacy. The patient's risk of morbidity, and progression or decompensation of psychiatric disease, is higher without this current treatment.     Next of Kin  Extended Emergency Contact Information  Primary Emergency Contact: Lois Roberson  Address: [address] [place] [address]  Home Phone: [phone]  Relation: Mother    Counseling / Coordination of Care  Patient's progress discussed with staff in treatment team meeting.  Medications, treatment progress and treatment plan reviewed with patient.  Medication education provided to patient.  Educated on importance of medication and treatment compliance.  Supportive therapy provided to patient.  Cognitive techniques utilized during the session.  Reassurance and supportive therapy provided.  Reoriented to reality and reassured.  Encouraged participation in milieu and group therapy on the  unit.  Crisis/safety plan discussed with patient.       Dustin Mcallister MD  Attending Psychiatrist   [facility]      This note has been constructed using a voice recognition system. There may be translation, syntax, or grammatical errors. If you have any questions, please contact the dictating provider.

## 2025-03-04 NOTE — NURSING NOTE
Compliant with  medications. Denies symptoms. Withdrawn to room. No complaints. Waiting placement.

## 2025-03-04 NOTE — ASSESSMENT & PLAN NOTE
"  Patient continues to do well and the plan is to continue with the current treatment plan as mentioned below with no changes.    Patient continues to be pending group home placement. Otherwise no clinical significant change.   continues to look for group home/personal-care home placement however this can be significantly impacted by patient no longer having money in the [employer] account and his bank account being over drawn.  Patient is quite altruistic in his desire not to have his friend go to alf however continues to display poor to limited insight regarding his ability to advocate for himself and organize/prioritize his own needs given his own difficult social situation. Would be at significant risk of harm if not discharged to a structured setting with support.    Medication regimen as follows, no changes as of 3/4/2025:  Abilify 5 mg daily as mood adjunct   Zoloft 150 mg daily for depression and anxiety  Continue to encourage participation in group therapy, milieu therapy and occupational therapy.  Continue to assess for side effects of medications.  Continue collaboration with [facility]IM for medical co-morbidities as indicated.  Continue discussion with CM/SW to assist with obtaining collateral, disposition planning, and the implementation of patient-centered individualized plan of care.  Continue frequent safety checks and vitals per unit protocol.    Risks, benefits and possible side effects of Medications: Risks, benefits, and possible side effects of medications have previously been explained. No new medications at this time.      Legal status: 201    Disposition: to be determined, pending SOAR application for potential group home placement. OT Cognitive Evaluation completed: \"Pt would benefit from discharge to a supportive environment that can provide checks for safety and compliance with IADLs. \"   Although patient's mood has stabilized, they are currently awaiting group home placement.  Their " ability to recognize safety hazards take medications and participate in IADLs are significantly impaired by their cognitive deficits compounded by their chronic mental health needs and would be at risk for significant decompensation without the structure and support of a group home setting.      No associated orders from this encounter found during lookback period of 72 hours.

## 2025-03-05 PROCEDURE — 99232 SBSQ HOSP IP/OBS MODERATE 35: CPT | Performed by: PSYCHIATRY & NEUROLOGY

## 2025-03-05 RX ADMIN — CYANOCOBALAMIN TAB 1000 MCG 1000 MCG: 1000 TAB at 08:08

## 2025-03-05 RX ADMIN — ATORVASTATIN CALCIUM 40 MG: 40 TABLET, FILM COATED ORAL at 17:04

## 2025-03-05 RX ADMIN — ARIPIPRAZOLE 5 MG: 5 TABLET ORAL at 08:08

## 2025-03-05 RX ADMIN — FAMOTIDINE 20 MG: 20 TABLET ORAL at 17:04

## 2025-03-05 RX ADMIN — SENNOSIDES AND DOCUSATE SODIUM 2 TABLET: 50; 8.6 TABLET ORAL at 17:04

## 2025-03-05 RX ADMIN — SERTRALINE HYDROCHLORIDE 150 MG: 100 TABLET ORAL at 08:08

## 2025-03-05 RX ADMIN — Medication 2500 UNITS: at 08:08

## 2025-03-05 RX ADMIN — FAMOTIDINE 20 MG: 20 TABLET ORAL at 08:08

## 2025-03-05 RX ADMIN — LEVOTHYROXINE SODIUM 37.5 MCG: 0.07 TABLET ORAL at 06:32

## 2025-03-05 NOTE — PLAN OF CARE
Problem: Depression  Goal: Treatment Goal: Demonstrate behavioral control of depressive symptoms, verbalize feelings of improved mood/affect, and adopt new coping skills prior to discharge  Outcome: Progressing  Goal: Verbalize thoughts and feelings  Description: Interventions:  - Assess and re-assess patient's level of risk   - Engage patient in 1:1 interactions, daily, for a minimum of 15 minutes   - Encourage patient to express feelings, fears, frustrations, hopes   Outcome: Progressing  Goal: Refrain from harming self  Description: Interventions:  - Monitor patient closely, per order   - Supervise medication ingestion, monitor effects and side effects   Outcome: Progressing  Goal: Refrain from isolation  Description: Interventions:  - Develop a trusting relationship   - Encourage socialization   Outcome: Progressing     Problem: Anxiety  Goal: Anxiety is at manageable level  Description: Interventions:  - Assess and monitor patient's anxiety level.   - Monitor for signs and symptoms (heart palpitations, chest pain, shortness of breath, headaches, nausea, feeling jumpy, restlessness, irritable, apprehensive).   - Collaborate with interdisciplinary team and initiate plan and interventions as ordered.  - [place] patient to unit/surroundings  - Explain treatment plan  - Encourage participation in care  - Encourage verbalization of concerns/fears  - Identify coping mechanisms  - Assist in developing anxiety-reducing skills  - Administer/offer alternative therapies  - Limit or eliminate stimulants  Outcome: Progressing     Problem: DISCHARGE PLANNING - CARE MANAGEMENT  Goal: Discharge to post-acute care or home with appropriate resources  Description: INTERVENTIONS:  - Conduct assessment to determine patient/family and health care team treatment goals, and need for post-acute services based on payer coverage, community resources, and patient preferences, and barriers to discharge  - Address psychosocial, clinical,  and financial barriers to discharge as identified in assessment in conjunction with the patient/family and health care team  - Arrange appropriate level of post-acute services according to patient’s   needs and preference and payer coverage in collaboration with the physician and health care team  - Communicate with and update the patient/family, physician, and health care team regarding progress on the discharge plan  - Arrange appropriate transportation to post-acute venues  Outcome: Progressing     Problem: Knowledge Deficit  Goal: Patient/family/caregiver demonstrates understanding of disease process, treatment plan, medications, and discharge instructions  Description: Complete learning assessment and assess knowledge base.  Interventions:  - Provide teaching at level of understanding  - Provide teaching via preferred learning methods  Outcome: Progressing     Problem: SLEEP DISTURBANCE  Goal: Will exhibit normal sleeping pattern  Description: Interventions:  -  Assess the patients sleep pattern, noting recent changes  - Administer medication as ordered  - Decrease environmental stimuli, including noise, as appropriate during the night  - Encourage the patient to actively participate in unit groups and or exercise during the day to enhance ability to achieve adequate sleep at night  - Assess the patient, in the morning, encouraging a description of sleep experience  Outcome: Progressing

## 2025-03-05 NOTE — NURSING NOTE
Patient has been seclusive to his room. Cooperative. Pleasant. Denies symptoms. Compliant with medications. Reports sleeping throughout the night. Waiting placement.

## 2025-03-05 NOTE — ASSESSMENT & PLAN NOTE
"  Patient continues to do well and the plan is to continue with the current treatment plan as mentioned below with no changes.    Patient continues to be pending group home placement. Otherwise no clinical significant change.   continues to look for group home/personal-care home placement however this can be significantly impacted by patient no longer having money in the [employer] account and his bank account being over drawn.  Patient is quite altruistic in his desire not to have his friend go to [facility] however continues to display poor to limited insight regarding his ability to advocate for himself and organize/prioritize his own needs given his own difficult social situation. Would be at significant risk of harm if not discharged to a structured setting with support.    Medication regimen as follows, no changes as of 3/5/2025:  Abilify 5 mg daily as mood adjunct   Zoloft 150 mg daily for depression and anxiety  Continue to encourage participation in group therapy, milieu therapy and occupational therapy.  Continue to assess for side effects of medications.  Continue collaboration with [facility]IM for medical co-morbidities as indicated.  Continue discussion with CM/SW to assist with obtaining collateral, disposition planning, and the implementation of patient-centered individualized plan of care.  Continue frequent safety checks and vitals per unit protocol.    Risks, benefits and possible side effects of Medications: Risks, benefits, and possible side effects of medications have previously been explained. No new medications at this time.      Legal status: 201    Disposition: to be determined, pending SOAR application for potential group home placement. OT Cognitive Evaluation completed: \"Pt would benefit from discharge to a supportive environment that can provide checks for safety and compliance with IADLs. \"   Although patient's mood has stabilized, they are currently awaiting group home placement.  Their " ability to recognize safety hazards take medications and participate in IADLs are significantly impaired by their cognitive deficits compounded by their chronic mental health needs and would be at risk for significant decompensation without the structure and support of a group home setting.      No associated orders from this encounter found during lookback period of 72 hours.

## 2025-03-05 NOTE — PROGRESS NOTES
03/05/25 0838   Team Meeting   Meeting Type Daily Rounds   Team Members Present   Team Members Present Physician;Nurse;   Physician Team Member Ary   [facility]   Care Management Team Member Noa   Patient/Family Present   Patient Present No   Patient's Family Present No     Pt is medication and meal compliant. Pt denies SI/HI/AVH. Pt is calm and cooperative. Pt attends groups and is visible on the unit.

## 2025-03-05 NOTE — PROGRESS NOTES
Progress Note - Behavioral Health   Name: Franco Roberson 39 y.o. male I MRN: [id number]  Unit/Bed#: -01 I Date of Admission: 5/14/2024   Date of Service: 3/5/2025 I Hospital Day: 295     Assessment & Plan  MDD (major depressive disorder), recurrent severe, without psychosis (HCC)    Patient continues to do well and the plan is to continue with the current treatment plan as mentioned below with no changes.    Patient continues to be pending group home placement. Otherwise no clinical significant change.   continues to look for group home/personal-care home placement however this can be significantly impacted by patient no longer having money in the [employer] account and his bank account being over drawn.  Patient is quite altruistic in his desire not to have his friend go to long term however continues to display poor to limited insight regarding his ability to advocate for himself and organize/prioritize his own needs given his own difficult social situation. Would be at significant risk of harm if not discharged to a structured setting with support.    Medication regimen as follows, no changes as of 3/5/2025:  Abilify 5 mg daily as mood adjunct   Zoloft 150 mg daily for depression and anxiety  Continue to encourage participation in group therapy, milieu therapy and occupational therapy.  Continue to assess for side effects of medications.  Continue collaboration with PATRICIA for medical co-morbidities as indicated.  Continue discussion with CM/SW to assist with obtaining collateral, disposition planning, and the implementation of patient-centered individualized plan of care.  Continue frequent safety checks and vitals per unit protocol.    Risks, benefits and possible side effects of Medications: Risks, benefits, and possible side effects of medications have previously been explained. No new medications at this time.      Legal status: 201    Disposition: to be determined, pending SOAR application for potential  "group home placement. OT Cognitive Evaluation completed: \"Pt would benefit from discharge to a supportive environment that can provide checks for safety and compliance with IADLs. \"   Although patient's mood has stabilized, they are currently awaiting group home placement.  Their ability to recognize safety hazards take medications and participate in IADLs are significantly impaired by their cognitive deficits compounded by their chronic mental health needs and would be at risk for significant decompensation without the structure and support of a group home setting.      No associated orders from this encounter found during lookback period of 72 hours.  Autism spectrum disorder  Continue supportive care    No associated orders from this encounter found during lookback period of 72 hours.    Hyperlipidemia  - Lipitor 40 mg       Plan     Recommended Treatment:    - Encourage early mobility and having a structured day  - Provide frequent re-orientation, and cognitive stimulation  - Ensure assistive devices are in proper working order (eye-glasses, hearing aids)  - Encourage adequate hydration, nutrition and monitor bowel movements  - Maintain sleep-wake cycle: Uninterrupted sleep time; low-level lighting at night  - Fall precaution  - f/u SLIM recs regarding the medical problems   - Continue medication titration and treatment plan; adjust medication to optimize treatment response and as clinically indicated. .   - Observation: routine            - VS: as per unit protocol  - Diet: Regular diet  - Encourage group attendance and milieu therapy  - Dispo: To be determined     Scheduled medications:  Current Facility-Administered Medications   Medication Dose Route Frequency Provider Last Rate    acetaminophen  650 mg Oral Q6H PRN Basilio Brown MD      acetaminophen  650 mg Oral Q4H PRN Basilio Brown MD      acetaminophen  975 mg Oral Q6H PRN Basilio Brown MD      aluminum-magnesium hydroxide-simethicone  " 30 mL Oral Q4H PRN Basilio Brown MD      ARIPiprazole  5 mg Oral Daily Basilio Panda MD      Artificial Tears  1 drop Both Eyes Q3H PRN Basilio Brown MD      atorvastatin  40 mg Oral Daily With Dinner WALLY Baptiste      benztropine  1 mg Intramuscular Q4H PRN Max 6/day Basilio Brown MD      benztropine  1 mg Oral Q4H PRN Max 6/day Basilio Brown MD      Cholecalciferol  2,500 Units Oral Daily Dustin Mcallister MD      cyanocobalamin  1,000 mcg Oral Daily WALLY Baptiste      Diclofenac Sodium  2 g Topical 4x Daily PRN WALLY Baptiste      hydrOXYzine HCL  50 mg Oral Q6H PRN Max 4/day Basilio Brown MD      Or    diphenhydrAMINE  50 mg Intramuscular Q6H PRN Basilio Brown MD      diphenhydrAMINE-zinc acetate   Topical Daily PRN Raj Guerrero MD      famotidine  20 mg Oral BID WALLY Baptiste      hydrOXYzine HCL  100 mg Oral Q6H PRN Max 4/day Basilio Brown MD      Or    LORazepam  2 mg Intramuscular Q6H PRN Basilio Brown MD      hydrOXYzine HCL  25 mg Oral Q6H PRN Max 4/day Basilio Brown MD      levothyroxine  37.5 mcg Oral Early Morning WALLY Baptiste      melatonin  6 mg Oral HS PRN WALLY Gomez      methocarbamol  500 mg Oral Q6H PRN WALLY Baptiste      OLANZapine  5 mg Oral Q4H PRN Max 3/day Basilio Brown MD      Or    OLANZapine  2.5 mg Intramuscular Q4H PRN Max 3/day Basilio Brown MD      OLANZapine  5 mg Oral Q3H PRN Max 3/day Basilio Brown MD      Or    OLANZapine  5 mg Intramuscular Q3H PRN Max 3/day Basilio Brown MD      OLANZapine  2.5 mg Oral Q4H PRN Max 6/day Basilio Brown MD      polyethylene glycol  17 g Oral Daily PRN Basilio Brown MD      propranolol  10 mg Oral Q8H PRN Basilio Brown MD      senna-docusate sodium  2 tablet Oral After Dinner Dustin Mcallister MD      sertraline  150 mg Oral Daily Rachana Borrego  "WALLY Higgins      traZODone  50 mg Oral HS PRN Basilio Brown MD        PRN:    acetaminophen    acetaminophen    acetaminophen    aluminum-magnesium hydroxide-simethicone    Artificial Tears    benztropine    benztropine    Diclofenac Sodium    hydrOXYzine HCL **OR** diphenhydrAMINE    diphenhydrAMINE-zinc acetate    hydrOXYzine HCL **OR** LORazepam    hydrOXYzine HCL    melatonin    methocarbamol    OLANZapine **OR** OLANZapine    OLANZapine **OR** OLANZapine    OLANZapine    polyethylene glycol    propranolol    traZODone       Subjective     Patient was visited on unit for continuing care; chart reviewed and discussed with multidisciplinary treatment team.  On approach, the patient was calm and cooperative. Denied any changes in mood, appetite, and energy level. No problem initiating and maintaining sleep.  Denied A/VH currently.  Denied SI/HI, intent or plan upon direct inquiry at this time.  No acute complaints or concerns at this time.      Patient continues to be visible in the milieu and interacts with staff and peers. No reports of aggression or self-injurious behavior on unit. No PRN medications used in the past 24 hours.    Patient accepted all offered medications and no adverse effects of medications noted or reported.    Objective    Current Mental Status Evaluation:  Mental Status Exam  Appearance: casually dressed, consistent with stated age  Motor: no psychomotor retardation, no gait abnormalities  Behavior: cooperative, answers questions appropriately  Speech: soft, normal rhythm  Mood: \"ok\"  Affect: constricted, depressed-appearing  Thought Process: concrete  Thought Content: denies delusions  Risk Potential: denies suicidal ideation, plan, or intent. Denies homicidal ideation  Perceptions: denies auditory hallucinations, denies visual hallucinations,   Sensorium: Oriented to person, place, time, and situation  Cognition: cognitive ability appears intact but was not quantitatively " tested  Consciousness: alert and awake  Attention: intact, able to focus without difficulty  Insight: limited  Judgement: limited            Vital signs in last 24 hours:    Temp:  [97.5 °F (36.4 °C)-97.6 °F (36.4 °C)] 97.6 °F (36.4 °C)  HR:  [63-94] 63  BP: (117-133)/(77-81) 133/81  Resp:  [17] 17  SpO2:  [96 %-98 %] 98 %  O2 Device: None (Room air)    Psychiatric Review of Systems:  Medication adverse effects: none  Sleep: unchanged  Appetite: unchanged  Behaviors over the past 24 hours: unchanged    Laboratory results:    I have personally reviewed all pertinent laboratory/tests results  No results found for this or any previous visit (from the past 48 hours).       Progress Toward Goals & Illness Status:   progressing, attends groups, participates in milieu therapy, discharge planning, placement pending    Patient is not at goal. They are not yet ready for discharge. The patient's condition currently requires active psychopharmacological medication management, interdisciplinary coordination with case management, and the utilization of adjunctive milieu and group therapy to augment psychopharmacological efficacy. The patient's risk of morbidity, and progression or decompensation of psychiatric disease, is higher without this current treatment.     Next of Kin  Extended Emergency Contact Information  Primary Emergency Contact: Lois Roberson  Address: [address] [place] [address] of [facility]  Home Phone: [phone]  Relation: Mother    Counseling / Coordination of Care  Patient's progress discussed with staff in treatment team meeting.  Medications, treatment progress and treatment plan reviewed with patient.  Medication education provided to patient.  Educated on importance of medication and treatment compliance.  Supportive therapy provided to patient.  Cognitive techniques utilized during the session.  Reassurance and supportive therapy provided.  Reoriented to  reality and reassured.  Encouraged participation in milieu and group therapy on the unit.  Crisis/safety plan discussed with patient.       Dustin Mcallister MD  Attending Psychiatrist   [facility]      This note has been constructed using a voice recognition system. There may be translation, syntax, or grammatical errors. If you have any questions, please contact the dictating provider.

## 2025-03-06 PROCEDURE — 99232 SBSQ HOSP IP/OBS MODERATE 35: CPT | Performed by: PSYCHIATRY & NEUROLOGY

## 2025-03-06 RX ADMIN — SENNOSIDES AND DOCUSATE SODIUM 2 TABLET: 50; 8.6 TABLET ORAL at 17:14

## 2025-03-06 RX ADMIN — CYANOCOBALAMIN TAB 1000 MCG 1000 MCG: 1000 TAB at 08:18

## 2025-03-06 RX ADMIN — FAMOTIDINE 20 MG: 20 TABLET ORAL at 08:18

## 2025-03-06 RX ADMIN — Medication 2500 UNITS: at 08:18

## 2025-03-06 RX ADMIN — ARIPIPRAZOLE 5 MG: 5 TABLET ORAL at 08:18

## 2025-03-06 RX ADMIN — FAMOTIDINE 20 MG: 20 TABLET ORAL at 17:14

## 2025-03-06 RX ADMIN — LEVOTHYROXINE SODIUM 37.5 MCG: 0.07 TABLET ORAL at 06:20

## 2025-03-06 RX ADMIN — SERTRALINE HYDROCHLORIDE 150 MG: 100 TABLET ORAL at 08:18

## 2025-03-06 RX ADMIN — ATORVASTATIN CALCIUM 40 MG: 40 TABLET, FILM COATED ORAL at 17:14

## 2025-03-06 NOTE — PROGRESS NOTES
Progress Note - Behavioral Health   Name: Franco Roberson 39 y.o. male I MRN: [id number]  Unit/Bed#: -01 I Date of Admission: 5/14/2024   Date of Service: 3/6/2025 I Hospital Day: 296     Assessment & Plan  MDD (major depressive disorder), recurrent severe, without psychosis (HCC)    Patient continues to do well and the plan is to continue with the current treatment plan as mentioned below with no changes.    Patient continues to be pending group home placement. Otherwise no clinical significant change.   continues to look for group home/personal-care home placement however this can be significantly impacted by patient no longer having money in the [employer] account and his bank account being over drawn.  Patient is quite altruistic in his desire not to have his friend go to [facility] however continues to display poor to limited insight regarding his ability to advocate for himself and organize/prioritize his own needs given his own difficult social situation. Would be at significant risk of harm if not discharged to a structured setting with support.    Medication regimen as follows, no changes as of 3/6/2025:  Abilify 5 mg daily as mood adjunct   Zoloft 150 mg daily for depression and anxiety  Continue to encourage participation in group therapy, milieu therapy and occupational therapy.  Continue to assess for side effects of medications.  Continue collaboration with PATRICIA for medical co-morbidities as indicated.  Continue discussion with CM/SW to assist with obtaining collateral, disposition planning, and the implementation of patient-centered individualized plan of care.  Continue frequent safety checks and vitals per unit protocol.    Risks, benefits and possible side effects of Medications: Risks, benefits, and possible side effects of medications have previously been explained. No new medications at this time.      Legal status: 201    Disposition: to be determined, pending SOAR application for potential  "group home placement. OT Cognitive Evaluation completed: \"Pt would benefit from discharge to a supportive environment that can provide checks for safety and compliance with IADLs. \"   Although patient's mood has stabilized, they are currently awaiting group home placement.  Their ability to recognize safety hazards take medications and participate in IADLs are significantly impaired by their cognitive deficits compounded by their chronic mental health needs and would be at risk for significant decompensation without the structure and support of a group home setting.      No associated orders from this encounter found during lookback period of 72 hours.  Autism spectrum disorder  Continue supportive care    No associated orders from this encounter found during lookback period of 72 hours.    Hyperlipidemia  - Lipitor 40 mg       Plan     Recommended Treatment:    - Encourage early mobility and having a structured day  - Provide frequent re-orientation, and cognitive stimulation  - Ensure assistive devices are in proper working order (eye-glasses, hearing aids)  - Encourage adequate hydration, nutrition and monitor bowel movements  - Maintain sleep-wake cycle: Uninterrupted sleep time; low-level lighting at night  - Fall precaution  - f/u SLIM recs regarding the medical problems   - Continue medication titration and treatment plan; adjust medication to optimize treatment response and as clinically indicated. .   - Observation: routine            - VS: as per unit protocol  - Diet: Regular diet  - Encourage group attendance and milieu therapy  - Dispo: To be determined     Scheduled medications:  Current Facility-Administered Medications   Medication Dose Route Frequency Provider Last Rate    acetaminophen  650 mg Oral Q6H PRN Basilio Brown MD      acetaminophen  650 mg Oral Q4H PRN Basilio Brown MD      acetaminophen  975 mg Oral Q6H PRN Basilio Brown MD      aluminum-magnesium hydroxide-simethicone  " 30 mL Oral Q4H PRN Basilio Brown MD      ARIPiprazole  5 mg Oral Daily Basilio Panda MD      Artificial Tears  1 drop Both Eyes Q3H PRN Basilio Brown MD      atorvastatin  40 mg Oral Daily With Dinner WALLY Baptiste      benztropine  1 mg Intramuscular Q4H PRN Max 6/day Basilio Brown MD      benztropine  1 mg Oral Q4H PRN Max 6/day Basilio Brown MD      Cholecalciferol  2,500 Units Oral Daily Dustin Mcallister MD      cyanocobalamin  1,000 mcg Oral Daily WALLY Baptiste      Diclofenac Sodium  2 g Topical 4x Daily PRN WALLY Baptiste      hydrOXYzine HCL  50 mg Oral Q6H PRN Max 4/day Basilio Brown MD      Or    diphenhydrAMINE  50 mg Intramuscular Q6H PRN Basilio Brown MD      diphenhydrAMINE-zinc acetate   Topical Daily PRN Raj Guerrero MD      famotidine  20 mg Oral BID WALLY Baptiste      hydrOXYzine HCL  100 mg Oral Q6H PRN Max 4/day Basilio Brown MD      Or    LORazepam  2 mg Intramuscular Q6H PRN Basilio Brown MD      hydrOXYzine HCL  25 mg Oral Q6H PRN Max 4/day Basilio Brown MD      levothyroxine  37.5 mcg Oral Early Morning WALLY Baptiste      melatonin  6 mg Oral HS PRN WALLY Gomez      methocarbamol  500 mg Oral Q6H PRN WALLY Baptiste      OLANZapine  5 mg Oral Q4H PRN Max 3/day Basilio Brown MD      Or    OLANZapine  2.5 mg Intramuscular Q4H PRN Max 3/day Basilio Brown MD      OLANZapine  5 mg Oral Q3H PRN Max 3/day Basilio Brown MD      Or    OLANZapine  5 mg Intramuscular Q3H PRN Max 3/day Basilio Brown MD      OLANZapine  2.5 mg Oral Q4H PRN Max 6/day Basilio Brown MD      polyethylene glycol  17 g Oral Daily PRN Basilio Brown MD      propranolol  10 mg Oral Q8H PRN Basilio Brown MD      senna-docusate sodium  2 tablet Oral After Dinner Dustin Mcallister MD      sertraline  150 mg Oral Daily Rachana Borrego  "WALLY Higgins      traZODone  50 mg Oral HS PRN Basilio Borwn MD        PRN:    acetaminophen    acetaminophen    acetaminophen    aluminum-magnesium hydroxide-simethicone    Artificial Tears    benztropine    benztropine    Diclofenac Sodium    hydrOXYzine HCL **OR** diphenhydrAMINE    diphenhydrAMINE-zinc acetate    hydrOXYzine HCL **OR** LORazepam    hydrOXYzine HCL    melatonin    methocarbamol    OLANZapine **OR** OLANZapine    OLANZapine **OR** OLANZapine    OLANZapine    polyethylene glycol    propranolol    traZODone       Subjective     Patient was visited on unit for continuing care; chart reviewed and discussed with multidisciplinary treatment team.  On approach, the patient was calm and cooperative. Denied any changes in mood, appetite, and energy level. No problem initiating and maintaining sleep.  Denied A/VH currently.  Denied SI/HI, intent or plan upon direct inquiry at this time. No acute complaints or concerns this AM.    Patient continues to be visible in the milieu and interacts with staff and peers. No reports of aggression or self-injurious behavior on unit. No PRN medications used in the past 24 hours.    Patient accepted all offered medications and no adverse effects of medications noted or reported.    Objective    Current Mental Status Evaluation:  Mental Status Exam  Appearance: casually dressed, consistent with stated age  Motor: no psychomotor retardation, no gait abnormalities  Behavior: cooperative, answers questions appropriately  Speech: soft, normal rhythm  Mood: \"ok\"  Affect: blunted  Thought Process: concrete  Thought Content: denies delusions or paranoia  Risk Potential: denies suicidal ideation, plan, or intent. Denies homicidal ideation  Perceptions: denies auditory hallucinations, denies visual hallucinations,   Sensorium: Oriented to person, place, time, and situation  Cognition: cognitive ability appears intact but was not quantitatively tested  Consciousness: alert and " awake  Attention: intact, able to focus without difficulty  Insight: limited  Judgement: limited          Vital signs in last 24 hours:    Temp:  [97.5 °F (36.4 °C)] 97.5 °F (36.4 °C)  HR:  [63-97] 63  BP: (119-129)/(71-73) 119/73  Resp:  [17] 17  SpO2:  [95 %-97 %] 97 %  O2 Device: None (Room air)    Psychiatric Review of Systems  Medication Adverse Effects: see HPI  Behaviors: see HPI  Sleep: unchanged  Appetite: unchanged    Laboratory results:    I have personally reviewed all pertinent laboratory/tests results  No results found for this or any previous visit (from the past 48 hours).       Progress Toward Goals & Illness Status:   progressing, attends groups, participates in milieu therapy, discharge planning, placement pending    Patient is not at goal. They are not yet ready for discharge. The patient's condition currently requires active psychopharmacological medication management, interdisciplinary coordination with case management, and the utilization of adjunctive milieu and group therapy to augment psychopharmacological efficacy. The patient's risk of morbidity, and progression or decompensation of psychiatric disease, is higher without this current treatment.     Next of Kin  Extended Emergency Contact Information  Primary Emergency Contact: Lois Roberson  Address: [address] [place] [address] of [facility]  Home Phone: [phone]  Relation: Mother    Counseling / Coordination of Care  Patient's progress discussed with staff in treatment team meeting.  Medications, treatment progress and treatment plan reviewed with patient.  Medication education provided to patient.  Educated on importance of medication and treatment compliance.  Supportive therapy provided to patient.  Cognitive techniques utilized during the session.  Reassurance and supportive therapy provided.  Reoriented to reality and reassured.  Encouraged participation in milieu and group therapy on  the unit.  Crisis/safety plan discussed with patient.       Dustin Mcallister MD  Attending Psychiatrist   [facility]      This note has been constructed using a voice recognition system. There may be translation, syntax, or grammatical errors. If you have any questions, please contact the dictating provider.

## 2025-03-06 NOTE — NURSING NOTE
Patient is calm and cooperative upon approach. Patient is isolative to self and room, laying down. Patient denies SI/HI/AH/VH. Patient is compliant with meds and meals.

## 2025-03-06 NOTE — PLAN OF CARE
Problem: Ineffective Coping  Goal: Participates in unit activities  Description: Interventions:  - Provide therapeutic environment   - Provide required programming   - Redirect inappropriate behaviors   Outcome: Progressing     Problem: Depression  Goal: Treatment Goal: Demonstrate behavioral control of depressive symptoms, verbalize feelings of improved mood/affect, and adopt new coping skills prior to discharge  Outcome: Progressing  Goal: Verbalize thoughts and feelings  Description: Interventions:  - Assess and re-assess patient's level of risk   - Engage patient in 1:1 interactions, daily, for a minimum of 15 minutes   - Encourage patient to express feelings, fears, frustrations, hopes   Outcome: Progressing  Goal: Refrain from harming self  Description: Interventions:  - Monitor patient closely, per order   - Supervise medication ingestion, monitor effects and side effects   Outcome: Progressing  Goal: Refrain from isolation  Description: Interventions:  - Develop a trusting relationship   - Encourage socialization   Outcome: Progressing     Problem: Anxiety  Goal: Anxiety is at manageable level  Description: Interventions:  - Assess and monitor patient's anxiety level.   - Monitor for signs and symptoms (heart palpitations, chest pain, shortness of breath, headaches, nausea, feeling jumpy, restlessness, irritable, apprehensive).   - Collaborate with interdisciplinary team and initiate plan and interventions as ordered.  - [place] patient to unit/surroundings  - Explain treatment plan  - Encourage participation in care  - Encourage verbalization of concerns/fears  - Identify coping mechanisms  - Assist in developing anxiety-reducing skills  - Administer/offer alternative therapies  - Limit or eliminate stimulants  Outcome: Progressing     Problem: Knowledge Deficit  Goal: Patient/family/caregiver demonstrates understanding of disease process, treatment plan, medications, and discharge  instructions  Description: Complete learning assessment and assess knowledge base.  Interventions:  - Provide teaching at level of understanding  - Provide teaching via preferred learning methods  Outcome: Progressing     Problem: SLEEP DISTURBANCE  Goal: Will exhibit normal sleeping pattern  Description: Interventions:  -  Assess the patients sleep pattern, noting recent changes  - Administer medication as ordered  - Decrease environmental stimuli, including noise, as appropriate during the night  - Encourage the patient to actively participate in unit groups and or exercise during the day to enhance ability to achieve adequate sleep at night  - Assess the patient, in the morning, encouraging a description of sleep experience  Outcome: Progressing

## 2025-03-06 NOTE — NURSING NOTE
Seclusive to room. Pleasant and cooperative during interaction. Eye contact poor. Denies symptoms. Compliant with medications. Waiting placement.

## 2025-03-06 NOTE — PROGRESS NOTES
03/06/25 0907   Team Meeting   Meeting Type Daily Rounds   Team Members Present   Team Members Present Physician;Nurse;   Physician Team Member Ary   Nursing Team Member [facility] Management Team Member Noa   Patient/Family Present   Patient Present No   Patient's Family Present No     Pt is medication and meal compliant. Pt denies SI/HI/AVH. Pt is calm and cooperative. Pt's discharge is pending placement.

## 2025-03-06 NOTE — ASSESSMENT & PLAN NOTE
"  Patient continues to do well and the plan is to continue with the current treatment plan as mentioned below with no changes.    Patient continues to be pending group home placement. Otherwise no clinical significant change.   continues to look for group home/personal-care home placement however this can be significantly impacted by patient no longer having money in the [employer] account and his bank account being over drawn.  Patient is quite altruistic in his desire not to have his friend go to [facility] however continues to display poor to limited insight regarding his ability to advocate for himself and organize/prioritize his own needs given his own difficult social situation. Would be at significant risk of harm if not discharged to a structured setting with support.    Medication regimen as follows, no changes as of 3/6/2025:  Abilify 5 mg daily as mood adjunct   Zoloft 150 mg daily for depression and anxiety  Continue to encourage participation in group therapy, milieu therapy and occupational therapy.  Continue to assess for side effects of medications.  Continue collaboration with [facility] for medical co-morbidities as indicated.  Continue discussion with CM/SW to assist with obtaining collateral, disposition planning, and the implementation of patient-centered individualized plan of care.  Continue frequent safety checks and vitals per unit protocol.    Risks, benefits and possible side effects of Medications: Risks, benefits, and possible side effects of medications have previously been explained. No new medications at this time.      Legal status: 201    Disposition: to be determined, pending SOAR application for potential group home placement. OT Cognitive Evaluation completed: \"Pt would benefit from discharge to a supportive environment that can provide checks for safety and compliance with IADLs. \"   Although patient's mood has stabilized, they are currently awaiting group home placement.  Their " ability to recognize safety hazards take medications and participate in IADLs are significantly impaired by their cognitive deficits compounded by their chronic mental health needs and would be at risk for significant decompensation without the structure and support of a group home setting.      No associated orders from this encounter found during lookback period of 72 hours.

## 2025-03-07 PROCEDURE — 99232 SBSQ HOSP IP/OBS MODERATE 35: CPT | Performed by: PSYCHIATRY & NEUROLOGY

## 2025-03-07 RX ADMIN — FAMOTIDINE 20 MG: 20 TABLET ORAL at 08:10

## 2025-03-07 RX ADMIN — CYANOCOBALAMIN TAB 1000 MCG 1000 MCG: 1000 TAB at 08:10

## 2025-03-07 RX ADMIN — ATORVASTATIN CALCIUM 40 MG: 40 TABLET, FILM COATED ORAL at 17:16

## 2025-03-07 RX ADMIN — ARIPIPRAZOLE 5 MG: 5 TABLET ORAL at 08:10

## 2025-03-07 RX ADMIN — Medication 2500 UNITS: at 08:10

## 2025-03-07 RX ADMIN — FAMOTIDINE 20 MG: 20 TABLET ORAL at 17:16

## 2025-03-07 RX ADMIN — LEVOTHYROXINE SODIUM 37.5 MCG: 0.07 TABLET ORAL at 06:18

## 2025-03-07 RX ADMIN — SERTRALINE HYDROCHLORIDE 150 MG: 100 TABLET ORAL at 08:10

## 2025-03-07 RX ADMIN — SENNOSIDES AND DOCUSATE SODIUM 2 TABLET: 50; 8.6 TABLET ORAL at 17:16

## 2025-03-07 NOTE — NURSING NOTE
Pt has been visible on the unit and social with select peers and staff. Medication and meal compliant. Reports he slept well and is denying SI/HI/AH/VH at this time. Attending groups. Pt reports he is still hopeful for discharge.

## 2025-03-07 NOTE — PLAN OF CARE
Problem: Ineffective Coping  Goal: Participates in unit activities  Description: Interventions:  - Provide therapeutic environment   - Provide required programming   - Redirect inappropriate behaviors   Outcome: Progressing     Problem: Depression  Goal: Treatment Goal: Demonstrate behavioral control of depressive symptoms, verbalize feelings of improved mood/affect, and adopt new coping skills prior to discharge  Outcome: Progressing  Goal: Verbalize thoughts and feelings  Description: Interventions:  - Assess and re-assess patient's level of risk   - Engage patient in 1:1 interactions, daily, for a minimum of 15 minutes   - Encourage patient to express feelings, fears, frustrations, hopes   Outcome: Progressing  Goal: Refrain from harming self  Description: Interventions:  - Monitor patient closely, per order   - Supervise medication ingestion, monitor effects and side effects   Outcome: Progressing  Goal: Refrain from isolation  Description: Interventions:  - Develop a trusting relationship   - Encourage socialization   Outcome: Progressing     Problem: Anxiety  Goal: Anxiety is at manageable level  Description: Interventions:  - Assess and monitor patient's anxiety level.   - Monitor for signs and symptoms (heart palpitations, chest pain, shortness of breath, headaches, nausea, feeling jumpy, restlessness, irritable, apprehensive).   - Collaborate with interdisciplinary team and initiate plan and interventions as ordered.  - [place] patient to unit/surroundings  - Explain treatment plan  - Encourage participation in care  - Encourage verbalization of concerns/fears  - Identify coping mechanisms  - Assist in developing anxiety-reducing skills  - Administer/offer alternative therapies  - Limit or eliminate stimulants  Outcome: Progressing     Problem: Knowledge Deficit  Goal: Patient/family/caregiver demonstrates understanding of disease process, treatment plan, medications, and discharge  instructions  Description: Complete learning assessment and assess knowledge base.  Interventions:  - Provide teaching at level of understanding  - Provide teaching via preferred learning methods  Outcome: Progressing     Problem: SLEEP DISTURBANCE  Goal: Will exhibit normal sleeping pattern  Description: Interventions:  -  Assess the patients sleep pattern, noting recent changes  - Administer medication as ordered  - Decrease environmental stimuli, including noise, as appropriate during the night  - Encourage the patient to actively participate in unit groups and or exercise during the day to enhance ability to achieve adequate sleep at night  - Assess the patient, in the morning, encouraging a description of sleep experience  Outcome: Progressing

## 2025-03-07 NOTE — PROGRESS NOTES
03/07/25 0836   Team Meeting   Meeting Type Daily Rounds   Team Members Present   Team Members Present Physician;Nurse;   Physician Team Member Ary   [facility]   Care Management Team Member Noa   Patient/Family Present   Patient Present No   Patient's Family Present No     Pt is medication and meal compliant. Pt denies SI/HI/AVH. Pt is calm and compliant. Pt is visible on the unit and socializes with select peers and staff.

## 2025-03-07 NOTE — PROGRESS NOTES
Progress Note - Behavioral Health   Name: Franco Roberson 39 y.o. male I MRN: [id number]  Unit/Bed#: -01 I Date of Admission: 5/14/2024   Date of Service: 3/7/2025 I Hospital Day: 297     Assessment & Plan  MDD (major depressive disorder), recurrent severe, without psychosis (HCC)    Patient continues to do well and the plan is to continue with the current treatment plan as mentioned below with no changes.    Patient continues to be pending group home placement. Otherwise no clinical significant change.   continues to look for group home/personal-care home placement however this can be significantly impacted by patient no longer having money in the [employer] account and his bank account being over drawn.  Patient is quite altruistic in his desire not to have his friend go to [facility] however continues to display poor to limited insight regarding his ability to advocate for himself and organize/prioritize his own needs given his own difficult social situation. Would be at significant risk of harm if not discharged to a structured setting with support.    Medication regimen as follows, no changes as of 3/7/2025:  Abilify 5 mg daily as mood adjunct   Zoloft 150 mg daily for depression and anxiety  Continue to encourage participation in group therapy, milieu therapy and occupational therapy.  Continue to assess for side effects of medications.  Continue collaboration with PATRICIA for medical co-morbidities as indicated.  Continue discussion with CM/SW to assist with obtaining collateral, disposition planning, and the implementation of patient-centered individualized plan of care.  Continue frequent safety checks and vitals per unit protocol.    Risks, benefits and possible side effects of Medications: Risks, benefits, and possible side effects of medications have previously been explained. No new medications at this time.      Legal status: 201    Disposition: to be determined, pending SOAR application for potential  "group home placement. OT Cognitive Evaluation completed: \"Pt would benefit from discharge to a supportive environment that can provide checks for safety and compliance with IADLs. \"   Although patient's mood has stabilized, they are currently awaiting group home placement.  Their ability to recognize safety hazards take medications and participate in IADLs are significantly impaired by their cognitive deficits compounded by their chronic mental health needs and would be at risk for significant decompensation without the structure and support of a group home setting.      No associated orders from this encounter found during lookback period of 72 hours.  Autism spectrum disorder  Continue supportive care    No associated orders from this encounter found during lookback period of 72 hours.    Hyperlipidemia  - Lipitor 40 mg       Plan     Recommended Treatment:    - Encourage early mobility and having a structured day  - Provide frequent re-orientation, and cognitive stimulation  - Ensure assistive devices are in proper working order (eye-glasses, hearing aids)  - Encourage adequate hydration, nutrition and monitor bowel movements  - Maintain sleep-wake cycle: Uninterrupted sleep time; low-level lighting at night  - Fall precaution  - f/u [facility] recs regarding the medical problems   - Continue medication titration and treatment plan; adjust medication to optimize treatment response and as clinically indicated. .   - Observation: routine            - VS: as per unit protocol  - Diet: Regular diet  - Encourage group attendance and milieu therapy  - Dispo: To be determined     Scheduled medications:  Current Facility-Administered Medications   Medication Dose Route Frequency Provider Last Rate    acetaminophen  650 mg Oral Q6H PRN Basilio Brown MD      acetaminophen  650 mg Oral Q4H PRN Basilio Brown MD      acetaminophen  975 mg Oral Q6H PRN Basilio Brown MD      aluminum-magnesium hydroxide-simethicone  " 30 mL Oral Q4H PRN Basilio Brown MD      ARIPiprazole  5 mg Oral Daily Basilio Panda MD      Artificial Tears  1 drop Both Eyes Q3H PRN Basilio Brown MD      atorvastatin  40 mg Oral Daily With Dinner WALLY Baptiste      benztropine  1 mg Intramuscular Q4H PRN Max 6/day Basilio Brown MD      benztropine  1 mg Oral Q4H PRN Max 6/day Basilio Brown MD      Cholecalciferol  2,500 Units Oral Daily Dustin Mcallister MD      cyanocobalamin  1,000 mcg Oral Daily WALLY Baptiste      Diclofenac Sodium  2 g Topical 4x Daily PRN WALLY Baptiste      hydrOXYzine HCL  50 mg Oral Q6H PRN Max 4/day Basilio Brown MD      Or    diphenhydrAMINE  50 mg Intramuscular Q6H PRN Basilio Brown MD      diphenhydrAMINE-zinc acetate   Topical Daily PRN Raj Guerrero MD      famotidine  20 mg Oral BID WALLY Baptiste      hydrOXYzine HCL  100 mg Oral Q6H PRN Max 4/day Basilio Brown MD      Or    LORazepam  2 mg Intramuscular Q6H PRN Basilio Brown MD      hydrOXYzine HCL  25 mg Oral Q6H PRN Max 4/day Basilio Brown MD      levothyroxine  37.5 mcg Oral Early Morning WALLY Baptiste      melatonin  6 mg Oral HS PRN WALLY Gomez      methocarbamol  500 mg Oral Q6H PRN WALLY Baptiste      OLANZapine  5 mg Oral Q4H PRN Max 3/day Basilio Brown MD      Or    OLANZapine  2.5 mg Intramuscular Q4H PRN Max 3/day Basilio Brown MD      OLANZapine  5 mg Oral Q3H PRN Max 3/day Basilio Brown MD      Or    OLANZapine  5 mg Intramuscular Q3H PRN Max 3/day Basilio Brown MD      OLANZapine  2.5 mg Oral Q4H PRN Max 6/day Basilio Brown MD      polyethylene glycol  17 g Oral Daily PRN Basilio Brown MD      propranolol  10 mg Oral Q8H PRN Basilio Brown MD      senna-docusate sodium  2 tablet Oral After Dinner Dustin Mcallister MD      sertraline  150 mg Oral Daily Rachana Borrego  "WALLY Higgins      traZODone  50 mg Oral HS PRN Basilio Brown MD        PRN:    acetaminophen    acetaminophen    acetaminophen    aluminum-magnesium hydroxide-simethicone    Artificial Tears    benztropine    benztropine    Diclofenac Sodium    hydrOXYzine HCL **OR** diphenhydrAMINE    diphenhydrAMINE-zinc acetate    hydrOXYzine HCL **OR** LORazepam    hydrOXYzine HCL    melatonin    methocarbamol    OLANZapine **OR** OLANZapine    OLANZapine **OR** OLANZapine    OLANZapine    polyethylene glycol    propranolol    traZODone       Subjective     Patient was visited on unit for continuing care; chart reviewed and discussed with multidisciplinary treatment team.  On approach, the patient was calm and cooperative. Denied any changes in mood, appetite, and energy level. No problem initiating and maintaining sleep.  Denied A/VH currently.  Denied SI/HI, intent or plan upon direct inquiry at this time. No acute complaints or concerns.     Patient continues to be visible in the milieu and interacts with staff and peers. No reports of aggression or self-injurious behavior on unit. No PRN medications used in the past 24 hours.    Patient accepted all offered medications and no adverse effects of medications noted or reported.    Objective    Current Mental Status Evaluation:  Mental Status Exam  Appearance: casually dressed, consistent with stated age  Motor: no psychomotor retardation, no gait abnormalities  Behavior: cooperative, answers questions appropriately  Speech: soft, normal rhythm  Mood: \"good\"  Affect: blunted  Thought Process: concrete  Thought Content: denies delusions  Risk Potential: denies suicidal ideation, plan, or intent. Denies homicidal ideation  Perceptions: denies auditory hallucinations, denies visual hallucinations,   Sensorium: Oriented to person, place, time, and situation  Cognition: cognitive ability appears intact but was not quantitatively tested  Consciousness: alert and " awake  Attention: intact, able to focus without difficulty  Insight: limited  Judgement: limited  \          Vital signs in last 24 hours:    Temp:  [97.5 °F (36.4 °C)-98.6 °F (37 °C)] 98.6 °F (37 °C)  HR:  [71-87] 71  BP: (104-115)/(69) 104/69  Resp:  [16-17] 16  SpO2:  [94 %-96 %] 94 %  O2 Device: None (Room air)    Psychiatric Review of Systems  Medication Adverse Effects: see HPI  Behaviors: see HPI  Sleep: unchanged  Appetite: unchanged    Laboratory results:    I have personally reviewed all pertinent laboratory/tests results  No results found for this or any previous visit (from the past 48 hours).       Progress Toward Goals & Illness Status:   progressing, attends groups, participates in milieu therapy, discharge planning, placement pending    Patient is not at goal. They are not yet ready for discharge. The patient's condition currently requires active psychopharmacological medication management, interdisciplinary coordination with case management, and the utilization of adjunctive milieu and group therapy to augment psychopharmacological efficacy. The patient's risk of morbidity, and progression or decompensation of psychiatric disease, is higher without this current treatment.     Next of Kin  Extended Emergency Contact Information  Primary Emergency Contact: Lois Roberson  Address: [address] [place] [address]  Home Phone: [phone]  Relation: Mother    Counseling / Coordination of Care  Patient's progress discussed with staff in treatment team meeting.  Medications, treatment progress and treatment plan reviewed with patient.  Medication education provided to patient.  Educated on importance of medication and treatment compliance.  Supportive therapy provided to patient.  Cognitive techniques utilized during the session.  Reassurance and supportive therapy provided.  Reoriented to reality and reassured.  Encouraged participation in milieu and  group therapy on the unit.  Crisis/safety plan discussed with patient.       Dustin Mcallister MD  Attending Psychiatrist   [facility]      This note has been constructed using a voice recognition system. There may be translation, syntax, or grammatical errors. If you have any questions, please contact the dictating provider.

## 2025-03-07 NOTE — ASSESSMENT & PLAN NOTE
"  Patient continues to do well and the plan is to continue with the current treatment plan as mentioned below with no changes.    Patient continues to be pending group home placement. Otherwise no clinical significant change.   continues to look for group home/personal-care home placement however this can be significantly impacted by patient no longer having money in the [employer] account and his bank account being over drawn.  Patient is quite altruistic in his desire not to have his friend go to [facility] however continues to display poor to limited insight regarding his ability to advocate for himself and organize/prioritize his own needs given his own difficult social situation. Would be at significant risk of harm if not discharged to a structured setting with support.    Medication regimen as follows, no changes as of 3/7/2025:  Abilify 5 mg daily as mood adjunct   Zoloft 150 mg daily for depression and anxiety  Continue to encourage participation in group therapy, milieu therapy and occupational therapy.  Continue to assess for side effects of medications.  Continue collaboration with SLIM for medical co-morbidities as indicated.  Continue discussion with CM/SW to assist with obtaining collateral, disposition planning, and the implementation of patient-centered individualized plan of care.  Continue frequent safety checks and vitals per unit protocol.    Risks, benefits and possible side effects of Medications: Risks, benefits, and possible side effects of medications have previously been explained. No new medications at this time.      Legal status: 201    Disposition: to be determined, pending SOAR application for potential group home placement. OT Cognitive Evaluation completed: \"Pt would benefit from discharge to a supportive environment that can provide checks for safety and compliance with IADLs. \"   Although patient's mood has stabilized, they are currently awaiting group home placement.  Their " ability to recognize safety hazards take medications and participate in IADLs are significantly impaired by their cognitive deficits compounded by their chronic mental health needs and would be at risk for significant decompensation without the structure and support of a group home setting.      No associated orders from this encounter found during lookback period of 72 hours.

## 2025-03-07 NOTE — NURSING NOTE
Patient is calm and cooperative upon approach. Patient is visible on unit, walking, but remains isolative to self. Patient denies SI/HI/AH/VH. Patient is compliant with meds and meals.

## 2025-03-08 PROCEDURE — 99232 SBSQ HOSP IP/OBS MODERATE 35: CPT | Performed by: PSYCHIATRY & NEUROLOGY

## 2025-03-08 RX ADMIN — LEVOTHYROXINE SODIUM 37.5 MCG: 0.07 TABLET ORAL at 06:30

## 2025-03-08 RX ADMIN — SENNOSIDES AND DOCUSATE SODIUM 2 TABLET: 50; 8.6 TABLET ORAL at 17:08

## 2025-03-08 RX ADMIN — CYANOCOBALAMIN TAB 1000 MCG 1000 MCG: 1000 TAB at 08:17

## 2025-03-08 RX ADMIN — FAMOTIDINE 20 MG: 20 TABLET ORAL at 08:17

## 2025-03-08 RX ADMIN — Medication 2500 UNITS: at 08:16

## 2025-03-08 RX ADMIN — FAMOTIDINE 20 MG: 20 TABLET ORAL at 17:08

## 2025-03-08 RX ADMIN — ATORVASTATIN CALCIUM 40 MG: 40 TABLET, FILM COATED ORAL at 17:08

## 2025-03-08 RX ADMIN — SERTRALINE HYDROCHLORIDE 150 MG: 100 TABLET ORAL at 08:16

## 2025-03-08 RX ADMIN — ARIPIPRAZOLE 5 MG: 5 TABLET ORAL at 08:17

## 2025-03-08 NOTE — PLAN OF CARE
Problem: Ineffective Coping  Goal: Participates in unit activities  Description: Interventions:  - Provide therapeutic environment   - Provide required programming   - Redirect inappropriate behaviors   Outcome: Progressing     Problem: Depression  Goal: Treatment Goal: Demonstrate behavioral control of depressive symptoms, verbalize feelings of improved mood/affect, and adopt new coping skills prior to discharge  Outcome: Progressing  Goal: Verbalize thoughts and feelings  Description: Interventions:  - Assess and re-assess patient's level of risk   - Engage patient in 1:1 interactions, daily, for a minimum of 15 minutes   - Encourage patient to express feelings, fears, frustrations, hopes   Outcome: Progressing  Goal: Refrain from harming self  Description: Interventions:  - Monitor patient closely, per order   - Supervise medication ingestion, monitor effects and side effects   Outcome: Progressing  Goal: Refrain from isolation  Description: Interventions:  - Develop a trusting relationship   - Encourage socialization   Outcome: Progressing     Problem: Anxiety  Goal: Anxiety is at manageable level  Description: Interventions:  - Assess and monitor patient's anxiety level.   - Monitor for signs and symptoms (heart palpitations, chest pain, shortness of breath, headaches, nausea, feeling jumpy, restlessness, irritable, apprehensive).   - Collaborate with interdisciplinary team and initiate plan and interventions as ordered.  - [place] patient to unit/surroundings  - Explain treatment plan  - Encourage participation in care  - Encourage verbalization of concerns/fears  - Identify coping mechanisms  - Assist in developing anxiety-reducing skills  - Administer/offer alternative therapies  - Limit or eliminate stimulants  Outcome: Progressing     Problem: Knowledge Deficit  Goal: Patient/family/caregiver demonstrates understanding of disease process, treatment plan, medications, and discharge  instructions  Description: Complete learning assessment and assess knowledge base.  Interventions:  - Provide teaching at level of understanding  - Provide teaching via preferred learning methods  Outcome: Progressing     Problem: SLEEP DISTURBANCE  Goal: Will exhibit normal sleeping pattern  Description: Interventions:  -  Assess the patients sleep pattern, noting recent changes  - Administer medication as ordered  - Decrease environmental stimuli, including noise, as appropriate during the night  - Encourage the patient to actively participate in unit groups and or exercise during the day to enhance ability to achieve adequate sleep at night  - Assess the patient, in the morning, encouraging a description of sleep experience  Outcome: Progressing

## 2025-03-08 NOTE — ASSESSMENT & PLAN NOTE
"  Patient continues to do well and the plan is to continue with the current treatment plan as mentioned below with no changes.    Patient continues to be pending group home placement. Otherwise no clinical significant change.   continues to look for group home/personal-care home placement however this can be significantly impacted by patient no longer having money in the [employer] account and his bank account being over drawn.  Patient is quite altruistic in his desire not to have his friend go to [facility] however continues to display poor to limited insight regarding his ability to advocate for himself and organize/prioritize his own needs given his own difficult social situation. Would be at significant risk of harm if not discharged to a structured setting with support.    Medication regimen as follows, no changes as of 3/7/2025:  Abilify 5 mg daily as mood adjunct   Zoloft 150 mg daily for depression and anxiety  Continue to encourage participation in group therapy, milieu therapy and occupational therapy.  Continue to assess for side effects of medications.  Continue collaboration with [facility]IM for medical co-morbidities as indicated.  Continue discussion with CM/SW to assist with obtaining collateral, disposition planning, and the implementation of patient-centered individualized plan of care.  Continue frequent safety checks and vitals per unit protocol.    Risks, benefits and possible side effects of Medications: Risks, benefits, and possible side effects of medications have previously been explained. No new medications at this time.      Legal status: 201    Disposition: to be determined, pending SOAR application for potential group home placement. OT Cognitive Evaluation completed: \"Pt would benefit from discharge to a supportive environment that can provide checks for safety and compliance with IADLs. \"   Although patient's mood has stabilized, they are currently awaiting group home placement.  Their " ability to recognize safety hazards take medications and participate in IADLs are significantly impaired by their cognitive deficits compounded by their chronic mental health needs and would be at risk for significant decompensation without the structure and support of a group home setting.      No associated orders from this encounter found during lookback period of 72 hours.

## 2025-03-08 NOTE — ASSESSMENT & PLAN NOTE
"  Patient continues to do well and the plan is to continue with the current treatment plan as mentioned below with no changes.    Patient continues to be pending group home placement. Otherwise no clinical significant change.   continues to look for group home/personal-care home placement however this can be significantly impacted by patient no longer having money in the [employer] account and his bank account being over drawn.  Patient is quite altruistic in his desire not to have his friend go to residential however continues to display poor to limited insight regarding his ability to advocate for himself and organize/prioritize his own needs given his own difficult social situation. Would be at significant risk of harm if not discharged to a structured setting with support.    Medication regimen as follows, no changes as of 3/7/2025:  Abilify 5 mg daily as mood adjunct   Zoloft 150 mg daily for depression and anxiety  Continue to encourage participation in group therapy, milieu therapy and occupational therapy.  Continue to assess for side effects of medications.  Continue collaboration with [facility] for medical co-morbidities as indicated.  Continue discussion with CM/SW to assist with obtaining collateral, disposition planning, and the implementation of patient-centered individualized plan of care.  Continue frequent safety checks and vitals per unit protocol.    Risks, benefits and possible side effects of Medications: Risks, benefits, and possible side effects of medications have previously been explained. No new medications at this time.      Legal status: 201    Disposition: to be determined, pending SOAR application for potential group home placement. OT Cognitive Evaluation completed: \"Pt would benefit from discharge to a supportive environment that can provide checks for safety and compliance with IADLs. \"   Although patient's mood has stabilized, they are currently awaiting group home placement.  Their " ability to recognize safety hazards take medications and participate in IADLs are significantly impaired by their cognitive deficits compounded by their chronic mental health needs and would be at risk for significant decompensation without the structure and support of a group home setting.      No associated orders from this encounter found during lookback period of 72 hours.

## 2025-03-08 NOTE — NURSING NOTE
Patient has been visible on and off over the course of the evening. Pleasant and appropriate in interaction.  He denies S.I.H.I.A/H V/H

## 2025-03-08 NOTE — PROGRESS NOTES
Progress Note - Behavioral Health   Name: Franco Roberson 39 y.o. male I MRN: [id number]  Unit/Bed#: -01 I Date of Admission: 5/14/2024   Date of Service: 3/8/2025 I Hospital Day: 298    Assessment & Plan  MDD (major depressive disorder), recurrent severe, without psychosis (HCC)    Patient continues to do well and the plan is to continue with the current treatment plan as mentioned below with no changes.    Patient continues to be pending group home placement. Otherwise no clinical significant change.   continues to look for group home/personal-care home placement however this can be significantly impacted by patient no longer having money in the [employer] account and his bank account being over drawn.  Patient is quite altruistic in his desire not to have his friend go to alf however continues to display poor to limited insight regarding his ability to advocate for himself and organize/prioritize his own needs given his own difficult social situation. Would be at significant risk of harm if not discharged to a structured setting with support.    Medication regimen as follows, no changes as of 3/7/2025:  Abilify 5 mg daily as mood adjunct   Zoloft 150 mg daily for depression and anxiety  Continue to encourage participation in group therapy, milieu therapy and occupational therapy.  Continue to assess for side effects of medications.  Continue collaboration with PATRICIA for medical co-morbidities as indicated.  Continue discussion with CM/SW to assist with obtaining collateral, disposition planning, and the implementation of patient-centered individualized plan of care.  Continue frequent safety checks and vitals per unit protocol.    Risks, benefits and possible side effects of Medications: Risks, benefits, and possible side effects of medications have previously been explained. No new medications at this time.      Legal status: 201    Disposition: to be determined, pending SOAR application for potential  "group home placement. OT Cognitive Evaluation completed: \"Pt would benefit from discharge to a supportive environment that can provide checks for safety and compliance with IADLs. \"   Although patient's mood has stabilized, they are currently awaiting group home placement.  Their ability to recognize safety hazards take medications and participate in IADLs are significantly impaired by their cognitive deficits compounded by their chronic mental health needs and would be at risk for significant decompensation without the structure and support of a group home setting.      No associated orders from this encounter found during lookback period of 72 hours.  Autism spectrum disorder  Continue supportive care    No associated orders from this encounter found during lookback period of 72 hours.    Hyperlipidemia  - Lipitor 40 mg       Current medications:  Current Facility-Administered Medications   Medication Dose Route Frequency Provider Last Rate    acetaminophen  650 mg Oral Q6H PRN Basilio Brown MD      acetaminophen  650 mg Oral Q4H PRN Basilio Brown MD      acetaminophen  975 mg Oral Q6H PRN Basilio Brown MD      aluminum-magnesium hydroxide-simethicone  30 mL Oral Q4H PRN Basilio Brown MD      ARIPiprazole  5 mg Oral Daily Basilio Panda MD      Artificial Tears  1 drop Both Eyes Q3H PRN Basilio Brown MD      atorvastatin  40 mg Oral Daily With Dinner WALLY Baptiste      benztropine  1 mg Intramuscular Q4H PRN Max 6/day Basilio Brown MD      benztropine  1 mg Oral Q4H PRN Max 6/day Basilio Brown MD      Cholecalciferol  2,500 Units Oral Daily Dustin Mcallister MD      cyanocobalamin  1,000 mcg Oral Daily WALLY Baptiste      Diclofenac Sodium  2 g Topical 4x Daily PRN WALLY Baptiste      hydrOXYzine HCL  50 mg Oral Q6H PRN Max 4/day Basilio Brwon MD      Or    diphenhydrAMINE  50 mg Intramuscular Q6H PRN Basilio Brown, " MD      diphenhydrAMINE-zinc acetate   Topical Daily PRN Raj Guerrero MD      famotidine  20 mg Oral BID WALLY Baptiste      hydrOXYzine HCL  100 mg Oral Q6H PRN Max 4/day Basilio Brown MD      Or    LORazepam  2 mg Intramuscular Q6H PRN Basilio Brown MD      hydrOXYzine HCL  25 mg Oral Q6H PRN Max 4/day Basilio Brown MD      levothyroxine  37.5 mcg Oral Early Morning WALLY Baptiste      melatonin  6 mg Oral HS PRN WALLY Gomez      methocarbamol  500 mg Oral Q6H PRN WALLY Baptiste      OLANZapine  5 mg Oral Q4H PRN Max 3/day Basilio Brown MD      Or    OLANZapine  2.5 mg Intramuscular Q4H PRN Max 3/day Basilio Brown MD      OLANZapine  5 mg Oral Q3H PRN Max 3/day Basilio Brown MD      Or    OLANZapine  5 mg Intramuscular Q3H PRN Max 3/day Basilio Brown MD      OLANZapine  2.5 mg Oral Q4H PRN Max 6/day Basilio Brown MD      polyethylene glycol  17 g Oral Daily PRN Basilio Brown MD      propranolol  10 mg Oral Q8H PRN Basilio Brown MD      senna-docusate sodium  2 tablet Oral After Dinner Dustin Mcallister MD      sertraline  150 mg Oral Daily WALLY Gomez      traZODone  50 mg Oral HS PRN Basilio Brown MD          Risks/Benefits of Treatment:     Risks, benefits, and possible side effects of medications explained to patient and patient verbalizes understanding and agreement for treatment.    Progress Toward Goals: progressing    Treatment Planning:     All current active medications have been reviewed.  Continue to monitor response to treatment and assess for potential side effects of medications.  Encourage group therapy, milieu therapy and occupational therapy  Collaboration with medical service for medical comorbidities as indicated.  Behavioral Health checks for safety monitoring.  Estimated Discharge Day: 3/31/2025   Legal Status: Status of Admission  Status of Admission:  "201  Release of Information Signed: Yes.    Subjective     Patient was doing well today, calm and cooperative, denied any changes in mood, appetite, energy and has no difficulties with sleep overnight, good initiation and maintenance of sleep throughout the night.  Denied any auditory or visual hallucinations, SI, HI, intent, or plan upon direct inquiry at this point in time.  No acute complaints.  Denies medication side effects and required no PRN medications over the last 24 hours.  No reports of aggression or self-injurious behaviors on the unit and agreeable with the plan to continue to work on placement and continue medications as prescribed.    Sleep:  unchanged  Appetite:  unchanged  Medication side effects: No  ROS: review of systems as noted above in HPI/Subjective report, all other systems are negative    Objective :  Temp:  [96.8 °F (36 °C)-98.6 °F (37 °C)] 96.8 °F (36 °C)  HR:  [71-79] 79  BP: ()/(58-69) 97/58  Resp:  [16] 16  SpO2:  [94 %-95 %] 95 %  O2 Device: None (Room air)    Temp:  [96.8 °F (36 °C)-98.6 °F (37 °C)] 96.8 °F (36 °C)  HR:  [71-79] 79  BP: ()/(58-69) 97/58  Resp:  [16] 16  SpO2:  [94 %-95 %] 95 %  O2 Device: None (Room air)    Mental Status Evaluation:    Appearance:  age appropriate, casually dressed   Behavior:  cooperative, responds to redirection   Speech:  normal rate, soft   Mood:  \"fine\"   Affect:  blunted   Thought Process:  [place]   Associations: intact associations   Thought Content:  no overt delusions   Perceptual Disturbances: denies auditory hallucinations when asked, does not appear responding to internal stimuli   Risk Potential: Suicidal ideation - None  Homicidal ideation - None  Potential for aggression - No   Sensorium:  oriented to person, place, and time/date   Memory:  recent and remote memory grossly intact   Consciousness:  alert and awake   Attention/Concentration: attention span and concentration appear shorter than expected for age   Insight:  " limited   Judgment: limited   Gait/Station: normal gait/station, normal balance   Motor Activity: no abnormal movements          Lab Results: I have reviewed the following results:  Results from the past 24 hours: No results found for this or any previous visit (from the past 24 hours).    Administrative Statements     Counseling / Coordination of Care:   Patient's progress discussed with staff in treatment team meeting.  Medication changes reviewed with staff in treatment team meeting.  Supportive therapy provided to patient.  Reassurance and supportive therapy provided.  Reoriented to reality and reassured.  Encouraged participation in milieu and group therapy on the unit.  Crisis/safety plan discussed with patient..    Tom Martinez MD 03/08/25

## 2025-03-08 NOTE — NURSING NOTE
Pt is pleasant and cooperative. Pt out of room for meals and needs. Denies SI/HI/AH/VH at this time. Not attending groups on the weekends. Denies any unmet needs or complaints.

## 2025-03-09 PROCEDURE — 99232 SBSQ HOSP IP/OBS MODERATE 35: CPT | Performed by: PSYCHIATRY & NEUROLOGY

## 2025-03-09 RX ADMIN — Medication 2500 UNITS: at 08:22

## 2025-03-09 RX ADMIN — LEVOTHYROXINE SODIUM 37.5 MCG: 0.07 TABLET ORAL at 06:15

## 2025-03-09 RX ADMIN — FAMOTIDINE 20 MG: 20 TABLET ORAL at 17:14

## 2025-03-09 RX ADMIN — ARIPIPRAZOLE 5 MG: 5 TABLET ORAL at 08:23

## 2025-03-09 RX ADMIN — SENNOSIDES AND DOCUSATE SODIUM 2 TABLET: 50; 8.6 TABLET ORAL at 17:14

## 2025-03-09 RX ADMIN — FAMOTIDINE 20 MG: 20 TABLET ORAL at 08:23

## 2025-03-09 RX ADMIN — SERTRALINE HYDROCHLORIDE 150 MG: 100 TABLET ORAL at 08:22

## 2025-03-09 RX ADMIN — ATORVASTATIN CALCIUM 40 MG: 40 TABLET, FILM COATED ORAL at 17:14

## 2025-03-09 RX ADMIN — CYANOCOBALAMIN TAB 1000 MCG 1000 MCG: 1000 TAB at 08:23

## 2025-03-09 NOTE — ASSESSMENT & PLAN NOTE
"  Patient continues to do well and the plan is to continue with the current treatment plan as mentioned below with no changes.    Patient continues to be pending group home placement. Otherwise no clinical significant change.   continues to look for group home/personal-care home placement however this can be significantly impacted by patient no longer having money in the [employer] account and his bank account being over drawn.  Patient is quite altruistic in his desire not to have his friend go to detention however continues to display poor to limited insight regarding his ability to advocate for himself and organize/prioritize his own needs given his own difficult social situation. Would be at significant risk of harm if not discharged to a structured setting with support.    Medication regimen as follows, no changes as of 3/7/2025:  Abilify 5 mg daily as mood adjunct   Zoloft 150 mg daily for depression and anxiety  Continue to encourage participation in group therapy, milieu therapy and occupational therapy.  Continue to assess for side effects of medications.  Continue collaboration with [facility] for medical co-morbidities as indicated.  Continue discussion with CM/SW to assist with obtaining collateral, disposition planning, and the implementation of patient-centered individualized plan of care.  Continue frequent safety checks and vitals per unit protocol.    Risks, benefits and possible side effects of Medications: Risks, benefits, and possible side effects of medications have previously been explained. No new medications at this time.      Legal status: 201    Disposition: to be determined, pending SOAR application for potential group home placement. OT Cognitive Evaluation completed: \"Pt would benefit from discharge to a supportive environment that can provide checks for safety and compliance with IADLs. \"   Although patient's mood has stabilized, they are currently awaiting group home placement.  Their " ability to recognize safety hazards take medications and participate in IADLs are significantly impaired by their cognitive deficits compounded by their chronic mental health needs and would be at risk for significant decompensation without the structure and support of a group home setting.      No associated orders from this encounter found during lookback period of 72 hours.

## 2025-03-09 NOTE — PROGRESS NOTES
Progress Note - Behavioral Health   Name: Franco Roberson 39 y.o. male I MRN: [id number]  Unit/Bed#: -01 I Date of Admission: 5/14/2024   Date of Service: 3/9/2025 I Hospital Day: 299    Assessment & Plan  MDD (major depressive disorder), recurrent severe, without psychosis (HCC)    Patient continues to do well and the plan is to continue with the current treatment plan as mentioned below with no changes.    Patient continues to be pending group home placement. Otherwise no clinical significant change.   continues to look for group home/personal-care home placement however this can be significantly impacted by patient no longer having money in the [employer] account and his bank account being over drawn.  Patient is quite altruistic in his desire not to have his friend go to halfway however continues to display poor to limited insight regarding his ability to advocate for himself and organize/prioritize his own needs given his own difficult social situation. Would be at significant risk of harm if not discharged to a structured setting with support.    Medication regimen as follows, no changes as of 3/7/2025:  Abilify 5 mg daily as mood adjunct   Zoloft 150 mg daily for depression and anxiety  Continue to encourage participation in group therapy, milieu therapy and occupational therapy.  Continue to assess for side effects of medications.  Continue collaboration with PATRICIA for medical co-morbidities as indicated.  Continue discussion with CM/SW to assist with obtaining collateral, disposition planning, and the implementation of patient-centered individualized plan of care.  Continue frequent safety checks and vitals per unit protocol.    Risks, benefits and possible side effects of Medications: Risks, benefits, and possible side effects of medications have previously been explained. No new medications at this time.      Legal status: 201    Disposition: to be determined, pending SOAR application for potential  "group home placement. OT Cognitive Evaluation completed: \"Pt would benefit from discharge to a supportive environment that can provide checks for safety and compliance with IADLs. \"   Although patient's mood has stabilized, they are currently awaiting group home placement.  Their ability to recognize safety hazards take medications and participate in IADLs are significantly impaired by their cognitive deficits compounded by their chronic mental health needs and would be at risk for significant decompensation without the structure and support of a group home setting.      No associated orders from this encounter found during lookback period of 72 hours.  Autism spectrum disorder  Continue supportive care    No associated orders from this encounter found during lookback period of 72 hours.    Hyperlipidemia  - Lipitor 40 mg       Current medications:  Current Facility-Administered Medications   Medication Dose Route Frequency Provider Last Rate    acetaminophen  650 mg Oral Q6H PRN Basilio Brown MD      acetaminophen  650 mg Oral Q4H PRN Basilio Brown MD      acetaminophen  975 mg Oral Q6H PRN Basilio Brown MD      aluminum-magnesium hydroxide-simethicone  30 mL Oral Q4H PRN Basilio Brown MD      ARIPiprazole  5 mg Oral Daily Basilio Panda MD      Artificial Tears  1 drop Both Eyes Q3H PRN Basilio Brown MD      atorvastatin  40 mg Oral Daily With Dinner WALLY Baptiste      benztropine  1 mg Intramuscular Q4H PRN Max 6/day Basilio Brown MD      benztropine  1 mg Oral Q4H PRN Max 6/day Basilio Brown MD      Cholecalciferol  2,500 Units Oral Daily Dustin Mcallister MD      cyanocobalamin  1,000 mcg Oral Daily WALLY Baptiste      Diclofenac Sodium  2 g Topical 4x Daily PRN WALLY Baptiste      hydrOXYzine HCL  50 mg Oral Q6H PRN Max 4/day Basilio Brown MD      Or    diphenhydrAMINE  50 mg Intramuscular Q6H PRN Basilio Brown, " MD      diphenhydrAMINE-zinc acetate   Topical Daily PRN Raj Guerrero MD      famotidine  20 mg Oral BID WALLY Baptiste      hydrOXYzine HCL  100 mg Oral Q6H PRN Max 4/day Basilio Brown MD      Or    LORazepam  2 mg Intramuscular Q6H PRN Basilio Brown MD      hydrOXYzine HCL  25 mg Oral Q6H PRN Max 4/day Basilio Brown MD      levothyroxine  37.5 mcg Oral Early Morning WALLY Baptiste      melatonin  6 mg Oral HS PRN WALLY Gomez      methocarbamol  500 mg Oral Q6H PRN WALLY Baptiste      OLANZapine  5 mg Oral Q4H PRN Max 3/day Basilio Brown MD      Or    OLANZapine  2.5 mg Intramuscular Q4H PRN Max 3/day Basilio Brown MD      OLANZapine  5 mg Oral Q3H PRN Max 3/day Basilio Brown MD      Or    OLANZapine  5 mg Intramuscular Q3H PRN Max 3/day Basilio Brown MD      OLANZapine  2.5 mg Oral Q4H PRN Max 6/day Basilio Brown MD      polyethylene glycol  17 g Oral Daily PRN Basilio Brown MD      propranolol  10 mg Oral Q8H PRN Basilio Brown MD      senna-docusate sodium  2 tablet Oral After Dinner Dustin Mcallister MD      sertraline  150 mg Oral Daily WALLY Gomez      traZODone  50 mg Oral HS PRN Basilio Brown MD          Risks/Benefits of Treatment:     Risks, benefits, and possible side effects of medications explained to patient and patient verbalizes understanding and agreement for treatment.    Progress Toward Goals: progressing    Treatment Planning:     All current active medications have been reviewed.  Continue to monitor response to treatment and assess for potential side effects of medications.  Encourage group therapy, milieu therapy and occupational therapy  Collaboration with medical service for medical comorbidities as indicated.  Behavioral Health checks for safety monitoring.  Estimated Discharge Day: 3/31/2025   Legal Status: Status of Admission  Status of Admission:  "201  Release of Information Signed: Yes.    Subjective     Franco is reporting he is doing well today.  Denies any acute concerns and overnight he was more interactive, denying psych symptoms.  Currently denies any difficulties with sleep or appetite.  Adamantly denies any desires for self-harm and denies any SI, HI, AVH.  Reports both his anxiety and depression are low on subjective measurements and he is denying any medication side effects.  He is agreeable with continuing with the plan to find a group home placement and understands this is a work in progress still.    Sleep: Unchanged  Appetite: Unchanged, good  Medication side effects: No  ROS: review of systems as noted above in HPI/Subjective report, all other systems are negative    Objective :  Temp:  [96.5 °F (35.8 °C)-96.6 °F (35.9 °C)] 96.6 °F (35.9 °C)  HR:  [70-78] 70  BP: (109-129)/(72-78) 109/78  Resp:  [16] 16  SpO2:  [96 %] 96 %  O2 Device: None (Room air)    Temp:  [96.5 °F (35.8 °C)-96.6 °F (35.9 °C)] 96.6 °F (35.9 °C)  HR:  [70-78] 70  BP: (109-129)/(72-78) 109/78  Resp:  [16] 16  SpO2:  [96 %] 96 %  O2 Device: None (Room air)    Mental Status Evaluation:    Appearance:  age appropriate, casually dressed, adequate grooming, looks stated age   Behavior:  cooperative, responds to redirection   Speech:  normal rate, normal pitch, soft   Mood:  \"Fine\"   Affect:  Blunted   Thought Process:  [place]   Associations: intact associations   Thought Content:  no overt delusions   Perceptual Disturbances: denies auditory hallucinations when asked, does not appear responding to internal stimuli   Risk Potential: Suicidal ideation - None at present  Homicidal ideation - None at present  Potential for aggression - No   Sensorium:  oriented to person, place, and time/date   Memory:  recent and remote memory grossly intact   Consciousness:  alert and awake   Attention/Concentration: attention span and concentration appear shorter than expected for age   Insight: "  limited   Judgment: limited   Gait/Station: normal gait/station, normal balance   Motor Activity: no abnormal movements          Lab Results: I have reviewed the following results:  Most Recent Labs:   Lab Results   Component Value Date    WBC 6.75 11/12/2024    RBC 5.06 11/12/2024    HGB 15.9 11/12/2024    HCT 46.2 11/12/2024     11/12/2024    RDW 12.8 11/12/2024    NEUTROABS 4.15 11/12/2024    SODIUM 139 11/12/2024    K 4.0 11/12/2024     11/12/2024    CO2 30 11/12/2024    BUN 17 11/12/2024    CREATININE 0.99 11/12/2024    GLUC 85 11/12/2024    CALCIUM 9.4 11/12/2024    AST 23 11/12/2024    ALT 32 11/12/2024    ALKPHOS 73 11/12/2024    TP 8.1 11/12/2024    ALB 4.4 11/12/2024    TBILI 0.54 11/12/2024    CHOLESTEROL 203 (H) 01/23/2025    HDL 45 01/23/2025    TRIG 222 (H) 01/23/2025    LDLCALC 114 (H) 01/23/2025    NONHDLC 158 01/23/2025    [id number] 2.532 11/12/2024    FREET4 0.56 (L) 09/10/2024    SYPHILISAB Non-reactive 05/15/2024       Administrative Statements     Counseling / Coordination of Care:   Patient's progress discussed with staff in treatment team meeting.  Medication changes reviewed with staff in treatment team meeting..    Tom Martinez MD 03/09/25

## 2025-03-09 NOTE — PLAN OF CARE
Problem: Depression  Goal: Treatment Goal: Demonstrate behavioral control of depressive symptoms, verbalize feelings of improved mood/affect, and adopt new coping skills prior to discharge  Outcome: Progressing  Goal: Verbalize thoughts and feelings  Description: Interventions:  - Assess and re-assess patient's level of risk   - Engage patient in 1:1 interactions, daily, for a minimum of 15 minutes   - Encourage patient to express feelings, fears, frustrations, hopes   Outcome: Progressing  Goal: Refrain from harming self  Description: Interventions:  - Monitor patient closely, per order   - Supervise medication ingestion, monitor effects and side effects   Outcome: Progressing  Goal: Refrain from isolation  Description: Interventions:  - Develop a trusting relationship   - Encourage socialization   Outcome: Progressing     Problem: Anxiety  Goal: Anxiety is at manageable level  Description: Interventions:  - Assess and monitor patient's anxiety level.   - Monitor for signs and symptoms (heart palpitations, chest pain, shortness of breath, headaches, nausea, feeling jumpy, restlessness, irritable, apprehensive).   - Collaborate with interdisciplinary team and initiate plan and interventions as ordered.  - [place] patient to unit/surroundings  - Explain treatment plan  - Encourage participation in care  - Encourage verbalization of concerns/fears  - Identify coping mechanisms  - Assist in developing anxiety-reducing skills  - Administer/offer alternative therapies  - Limit or eliminate stimulants  Outcome: Progressing     Problem: DISCHARGE PLANNING - CARE MANAGEMENT  Goal: Discharge to post-acute care or home with appropriate resources  Description: INTERVENTIONS:  - Conduct assessment to determine patient/family and health care team treatment goals, and need for post-acute services based on payer coverage, community resources, and patient preferences, and barriers to discharge  - Address psychosocial, clinical,  and financial barriers to discharge as identified in assessment in conjunction with the patient/family and health care team  - Arrange appropriate level of post-acute services according to patient’s   needs and preference and payer coverage in collaboration with the physician and health care team  - Communicate with and update the patient/family, physician, and health care team regarding progress on the discharge plan  - Arrange appropriate transportation to post-acute venues  Outcome: Progressing     Problem: Knowledge Deficit  Goal: Patient/family/caregiver demonstrates understanding of disease process, treatment plan, medications, and discharge instructions  Description: Complete learning assessment and assess knowledge base.  Interventions:  - Provide teaching at level of understanding  - Provide teaching via preferred learning methods  Outcome: Progressing     Problem: SLEEP DISTURBANCE  Goal: Will exhibit normal sleeping pattern  Description: Interventions:  -  Assess the patients sleep pattern, noting recent changes  - Administer medication as ordered  - Decrease environmental stimuli, including noise, as appropriate during the night  - Encourage the patient to actively participate in unit groups and or exercise during the day to enhance ability to achieve adequate sleep at night  - Assess the patient, in the morning, encouraging a description of sleep experience  Outcome: Progressing

## 2025-03-09 NOTE — NURSING NOTE
Patient was visible in the milieu earlier in the evening, and was a little more interactive on occasion. Otherwise he was walking quietly around the perimeter and keeping to himself. He denies S.I.H.I.A/HV/H

## 2025-03-09 NOTE — NURSING NOTE
Pt is withdrawn to self in room. Out for meals and needs. Medication compliant. Encouraged to attend groups. Denies SI/HI/AH/VH at this time. Denies any unmet needs or complaints.

## 2025-03-09 NOTE — PLAN OF CARE
Problem: Depression  Goal: Treatment Goal: Demonstrate behavioral control of depressive symptoms, verbalize feelings of improved mood/affect, and adopt new coping skills prior to discharge  Outcome: Progressing  Goal: Verbalize thoughts and feelings  Description: Interventions:  - Assess and re-assess patient's level of risk   - Engage patient in 1:1 interactions, daily, for a minimum of 15 minutes   - Encourage patient to express feelings, fears, frustrations, hopes   Outcome: Progressing  Goal: Refrain from harming self  Description: Interventions:  - Monitor patient closely, per order   - Supervise medication ingestion, monitor effects and side effects   Outcome: Progressing  Goal: Refrain from isolation  Description: Interventions:  - Develop a trusting relationship   - Encourage socialization   Outcome: Progressing     Problem: Anxiety  Goal: Anxiety is at manageable level  Description: Interventions:  - Assess and monitor patient's anxiety level.   - Monitor for signs and symptoms (heart palpitations, chest pain, shortness of breath, headaches, nausea, feeling jumpy, restlessness, irritable, apprehensive).   - Collaborate with interdisciplinary team and initiate plan and interventions as ordered.  - [place] patient to unit/surroundings  - Explain treatment plan  - Encourage participation in care  - Encourage verbalization of concerns/fears  - Identify coping mechanisms  - Assist in developing anxiety-reducing skills  - Administer/offer alternative therapies  - Limit or eliminate stimulants  Outcome: Progressing     Problem: DISCHARGE PLANNING - CARE MANAGEMENT  Goal: Discharge to post-acute care or home with appropriate resources  Description: INTERVENTIONS:  - Conduct assessment to determine patient/family and health care team treatment goals, and need for post-acute services based on payer coverage, community resources, and patient preferences, and barriers to discharge  - Address psychosocial, clinical,  and financial barriers to discharge as identified in assessment in conjunction with the patient/family and health care team  - Arrange appropriate level of post-acute services according to patient’s   needs and preference and payer coverage in collaboration with the physician and health care team  - Communicate with and update the patient/family, physician, and health care team regarding progress on the discharge plan  - Arrange appropriate transportation to post-acute venues  Outcome: Progressing     Problem: Knowledge Deficit  Goal: Patient/family/caregiver demonstrates understanding of disease process, treatment plan, medications, and discharge instructions  Description: Complete learning assessment and assess knowledge base.  Interventions:  - Provide teaching at level of understanding  - Provide teaching via preferred learning methods  Outcome: Progressing     Problem: SLEEP DISTURBANCE  Goal: Will exhibit normal sleeping pattern  Description: Interventions:  -  Assess the patients sleep pattern, noting recent changes  - Administer medication as ordered  - Decrease environmental stimuli, including noise, as appropriate during the night  - Encourage the patient to actively participate in unit groups and or exercise during the day to enhance ability to achieve adequate sleep at night  - Assess the patient, in the morning, encouraging a description of sleep experience  Outcome: Progressing     Problem: Ineffective Coping  Goal: Participates in unit activities  Description: Interventions:  - Provide therapeutic environment   - Provide required programming   - Redirect inappropriate behaviors   Outcome: Not Progressing

## 2025-03-10 PROCEDURE — 99232 SBSQ HOSP IP/OBS MODERATE 35: CPT | Performed by: PSYCHIATRY & NEUROLOGY

## 2025-03-10 RX ADMIN — SERTRALINE HYDROCHLORIDE 150 MG: 100 TABLET ORAL at 08:20

## 2025-03-10 RX ADMIN — CYANOCOBALAMIN TAB 1000 MCG 1000 MCG: 1000 TAB at 08:20

## 2025-03-10 RX ADMIN — ATORVASTATIN CALCIUM 40 MG: 40 TABLET, FILM COATED ORAL at 17:20

## 2025-03-10 RX ADMIN — ARIPIPRAZOLE 5 MG: 5 TABLET ORAL at 08:20

## 2025-03-10 RX ADMIN — FAMOTIDINE 20 MG: 20 TABLET ORAL at 17:20

## 2025-03-10 RX ADMIN — Medication 2500 UNITS: at 08:20

## 2025-03-10 RX ADMIN — FAMOTIDINE 20 MG: 20 TABLET ORAL at 08:20

## 2025-03-10 RX ADMIN — LEVOTHYROXINE SODIUM 37.5 MCG: 0.07 TABLET ORAL at 06:26

## 2025-03-10 RX ADMIN — SENNOSIDES AND DOCUSATE SODIUM 2 TABLET: 50; 8.6 TABLET ORAL at 17:20

## 2025-03-10 NOTE — NURSING NOTE
Patient has been visible on and off throughout the evening but keeping to himself. He has been pleasant and appropriate with peers and staff. He denies S.I.H.I.A/H V/H

## 2025-03-10 NOTE — NURSING NOTE
Patient is calm and pleasant upon approach. Patient is visible on unit, attending groups. Patient denies SI/HI/AH/VH. Patient is compliant with meds and meals .

## 2025-03-10 NOTE — ASSESSMENT & PLAN NOTE
"  Patient continues to do well and the plan is to continue with the current treatment plan as mentioned below with no changes.    Patient continues to be pending group home placement. Otherwise no clinical significant change.   continues to look for group home/personal-care home placement however this can be significantly impacted by patient no longer having money in the ABLE account and his bank account being over drawn.  Patient is quite altruistic in his desire not to have his friend go to custodial however continues to display poor to limited insight regarding his ability to advocate for himself and organize/prioritize his own needs given his own difficult social situation. Would be at significant risk of harm if not discharged to a structured setting with support.    Medication regimen as follows, no changes as of 3/10/2025:  Abilify 5 mg daily as mood adjunct   Zoloft 150 mg daily for depression and anxiety  Continue to encourage participation in group therapy, milieu therapy and occupational therapy.  Continue to assess for side effects of medications.  Continue collaboration with SLIM for medical co-morbidities as indicated.  Continue discussion with CM/SW to assist with obtaining collateral, disposition planning, and the implementation of patient-centered individualized plan of care.  Continue frequent safety checks and vitals per unit protocol.    Risks, benefits and possible side effects of Medications: Risks, benefits, and possible side effects of medications have previously been explained. No new medications at this time.      Legal status: 201    Disposition: to be determined, pending SOAR application for potential group home placement. OT Cognitive Evaluation completed: \"Pt would benefit from discharge to a supportive environment that can provide checks for safety and compliance with IADLs. \"   Although patient's mood has stabilized, they are currently awaiting group home placement.  Their " ability to recognize safety hazards take medications and participate in IADLs are significantly impaired by their cognitive deficits compounded by their chronic mental health needs and would be at risk for significant decompensation without the structure and support of a group home setting.      No associated orders from this encounter found during lookback period of 72 hours.

## 2025-03-10 NOTE — PROGRESS NOTES
Progress Note - Behavioral Health   Name: Franco Roberson 39 y.o. male I MRN: [id number]  Unit/Bed#: -01 I Date of Admission: 5/14/2024   Date of Service: 3/10/2025 I Hospital Day: 300     Assessment & Plan  MDD (major depressive disorder), recurrent severe, without psychosis (HCC)    Patient continues to do well and the plan is to continue with the current treatment plan as mentioned below with no changes.    Patient continues to be pending group home placement. Otherwise no clinical significant change.   continues to look for group home/personal-care home placement however this can be significantly impacted by patient no longer having money in the [employer] account and his bank account being over drawn.  Patient is quite altruistic in his desire not to have his friend go to nursing home however continues to display poor to limited insight regarding his ability to advocate for himself and organize/prioritize his own needs given his own difficult social situation. Would be at significant risk of harm if not discharged to a structured setting with support.    Medication regimen as follows, no changes as of 3/10/2025:  Abilify 5 mg daily as mood adjunct   Zoloft 150 mg daily for depression and anxiety  Continue to encourage participation in group therapy, milieu therapy and occupational therapy.  Continue to assess for side effects of medications.  Continue collaboration with PATRICIA for medical co-morbidities as indicated.  Continue discussion with CM/SW to assist with obtaining collateral, disposition planning, and the implementation of patient-centered individualized plan of care.  Continue frequent safety checks and vitals per unit protocol.    Risks, benefits and possible side effects of Medications: Risks, benefits, and possible side effects of medications have previously been explained. No new medications at this time.      Legal status: 201    Disposition: to be determined, pending SOAR application for  "potential group home placement. OT Cognitive Evaluation completed: \"Pt would benefit from discharge to a supportive environment that can provide checks for safety and compliance with IADLs. \"   Although patient's mood has stabilized, they are currently awaiting group home placement.  Their ability to recognize safety hazards take medications and participate in IADLs are significantly impaired by their cognitive deficits compounded by their chronic mental health needs and would be at risk for significant decompensation without the structure and support of a group home setting.      No associated orders from this encounter found during lookback period of 72 hours.  Autism spectrum disorder  Continue supportive care    No associated orders from this encounter found during lookback period of 72 hours.    Hyperlipidemia  - Lipitor 40 mg       Plan     Recommended Treatment:    - Encourage early mobility and having a structured day  - Provide frequent re-orientation, and cognitive stimulation  - Ensure assistive devices are in proper working order (eye-glasses, hearing aids)  - Encourage adequate hydration, nutrition and monitor bowel movements  - Maintain sleep-wake cycle: Uninterrupted sleep time; low-level lighting at night  - Fall precaution  - f/u SLIM recs regarding the medical problems   - Continue medication titration and treatment plan; adjust medication to optimize treatment response and as clinically indicated. .   - Observation: routine            - VS: as per unit protocol  - Diet: Regular diet  - Encourage group attendance and milieu therapy  - Dispo: To be determined     Scheduled medications:  Current Facility-Administered Medications   Medication Dose Route Frequency Provider Last Rate    acetaminophen  650 mg Oral Q6H PRN Basilio Brown MD      acetaminophen  650 mg Oral Q4H PRN Basilio Brown MD      acetaminophen  975 mg Oral Q6H PRN Basilio Brown MD      aluminum-magnesium " hydroxide-simethicone  30 mL Oral Q4H PRN Basilio Brown MD      ARIPiprazole  5 mg Oral Daily Basilio Panda MD      Artificial Tears  1 drop Both Eyes Q3H PRN Basilio Brown MD      atorvastatin  40 mg Oral Daily With Dinner WALLY Baptiste      benztropine  1 mg Intramuscular Q4H PRN Max 6/day Basilio Brown MD      benztropine  1 mg Oral Q4H PRN Max 6/day Basilio Brown MD      Cholecalciferol  2,500 Units Oral Daily Dustin Mcallister MD      cyanocobalamin  1,000 mcg Oral Daily WALLY Baptiste      Diclofenac Sodium  2 g Topical 4x Daily PRN WALLY Baptiste      hydrOXYzine HCL  50 mg Oral Q6H PRN Max 4/day Basilio Brown MD      Or    diphenhydrAMINE  50 mg Intramuscular Q6H PRN Basilio Brown MD      diphenhydrAMINE-zinc acetate   Topical Daily PRN Raj Guerrero MD      famotidine  20 mg Oral BID WALLY Baptiste      hydrOXYzine HCL  100 mg Oral Q6H PRN Max 4/day Basilio Brown MD      Or    LORazepam  2 mg Intramuscular Q6H PRN Basilio Brown MD      hydrOXYzine HCL  25 mg Oral Q6H PRN Max 4/day Basilio Brown MD      levothyroxine  37.5 mcg Oral Early Morning WALLY Baptiste      melatonin  6 mg Oral HS PRN WALLY Gomez      methocarbamol  500 mg Oral Q6H PRN WALLY Baptiste      OLANZapine  5 mg Oral Q4H PRN Max 3/day Basilio Brown MD      Or    OLANZapine  2.5 mg Intramuscular Q4H PRN Max 3/day Basilio Brown MD      OLANZapine  5 mg Oral Q3H PRN Max 3/day Basilio Brown MD      Or    OLANZapine  5 mg Intramuscular Q3H PRN Max 3/day Basilio Brown MD      OLANZapine  2.5 mg Oral Q4H PRN Max 6/day Basilio Brown MD      polyethylene glycol  17 g Oral Daily PRN Basilio Brown MD      propranolol  10 mg Oral Q8H PRN Basilio Brown MD      senna-docusate sodium  2 tablet Oral After Dinner Dustin Mcallister MD      sertraline  150 mg Oral  "Daily WALLY Gomez      traZODone  50 mg Oral HS PRN Basilio Brown MD        PRN:    acetaminophen    acetaminophen    acetaminophen    aluminum-magnesium hydroxide-simethicone    Artificial Tears    benztropine    benztropine    Diclofenac Sodium    hydrOXYzine HCL **OR** diphenhydrAMINE    diphenhydrAMINE-zinc acetate    hydrOXYzine HCL **OR** LORazepam    hydrOXYzine HCL    melatonin    methocarbamol    OLANZapine **OR** OLANZapine    OLANZapine **OR** OLANZapine    OLANZapine    polyethylene glycol    propranolol    traZODone       Subjective     Patient was visited on unit for continuing care; chart reviewed and discussed with multidisciplinary treatment team.  On approach, the patient was calm and cooperative. Denied any changes in mood, appetite, and energy level. No problem initiating and maintaining sleep.  Denied A/VH currently.  Denied SI/HI, intent or plan upon direct inquiry at this time.    Patient continues to be visible in the milieu and interacts with staff and peers. No reports of aggression or self-injurious behavior on unit. No PRN medications used in the past 24 hours.    Patient accepted all offered medications and no adverse effects of medications noted or reported.    Objective    Current Mental Status Evaluation:  Mental Status Exam  Appearance: casually dressed, consistent with stated age  Motor: no psychomotor retardation, no gait abnormalities  Behavior: cooperative, answers questions appropriately  Speech: soft, normal rhythm  Mood: \"ok\"  Affect: blunted  Thought Process:concrete  Thought Content: denies delusions  Risk Potential: denies suicidal ideation, plan, or intent. Denies homicidal ideation  Perceptions: denies auditory hallucinations, denies visual hallucinations,   Sensorium: Oriented to person, place, time, and situation  Cognition: cognitive ability appears intact but was not quantitatively tested  Consciousness: alert and awake  Attention: intact, able to " focus without difficulty  Insight: limited  Judgement: limited              Vital signs in last 24 hours:    Temp:  [98.1 °F (36.7 °C)] 98.1 °F (36.7 °C)  HR:  [66-83] 66  BP: (105-112)/(72-75) 105/72  Resp:  [16] 16  SpO2:  [96 %] 96 %  O2 Device: None (Room air)    Psychiatric Review of Systems  Medication Adverse Effects: see HPI  Behaviors: see HPI  Sleep: unchanged  Appetite: unchanged    Laboratory results:    I have personally reviewed all pertinent laboratory/tests results  No results found for this or any previous visit (from the past 48 hours).       Progress Toward Goals & Illness Status:   attends groups, participates in milieu therapy, mood is stabilizing, discharge planning, placement pending    Patient is not at goal. They are not yet ready for discharge. The patient's condition currently requires active psychopharmacological medication management, interdisciplinary coordination with case management, and the utilization of adjunctive milieu and group therapy to augment psychopharmacological efficacy. The patient's risk of morbidity, and progression or decompensation of psychiatric disease, is higher without this current treatment.     Next of Kin  Extended Emergency Contact Information  Primary Emergency Contact: Lois Roberson  Address: [address] [place] [address] [place]  Home Phone: [phone]  Relation: Mother    Counseling / Coordination of Care  Patient's progress discussed with staff in treatment team meeting.  Medications, treatment progress and treatment plan reviewed with patient.  Medication education provided to patient.  Educated on importance of medication and treatment compliance.  Supportive therapy provided to patient.  Cognitive techniques utilized during the session.  Reassurance and supportive therapy provided.  Reoriented to reality and reassured.  Encouraged participation in milieu and group therapy on the unit.  Crisis/safety  plan discussed with patient.       Dustin Mcallister MD  Attending Psychiatrist   [facility]      This note has been constructed using a voice recognition system. There may be translation, syntax, or grammatical errors. If you have any questions, please contact the dictating provider.

## 2025-03-10 NOTE — PLAN OF CARE
Problem: Ineffective Coping  Goal: Participates in unit activities  Description: Interventions:  - Provide therapeutic environment   - Provide required programming   - Redirect inappropriate behaviors   Outcome: Progressing     Problem: Depression  Goal: Treatment Goal: Demonstrate behavioral control of depressive symptoms, verbalize feelings of improved mood/affect, and adopt new coping skills prior to discharge  Outcome: Progressing  Goal: Verbalize thoughts and feelings  Description: Interventions:  - Assess and re-assess patient's level of risk   - Engage patient in 1:1 interactions, daily, for a minimum of 15 minutes   - Encourage patient to express feelings, fears, frustrations, hopes   Outcome: Progressing  Goal: Refrain from harming self  Description: Interventions:  - Monitor patient closely, per order   - Supervise medication ingestion, monitor effects and side effects   Outcome: Progressing  Goal: Refrain from isolation  Description: Interventions:  - Develop a trusting relationship   - Encourage socialization   Outcome: Progressing     Problem: Anxiety  Goal: Anxiety is at manageable level  Description: Interventions:  - Assess and monitor patient's anxiety level.   - Monitor for signs and symptoms (heart palpitations, chest pain, shortness of breath, headaches, nausea, feeling jumpy, restlessness, irritable, apprehensive).   - Collaborate with interdisciplinary team and initiate plan and interventions as ordered.  - [place] patient to unit/surroundings  - Explain treatment plan  - Encourage participation in care  - Encourage verbalization of concerns/fears  - Identify coping mechanisms  - Assist in developing anxiety-reducing skills  - Administer/offer alternative therapies  - Limit or eliminate stimulants  Outcome: Progressing     Problem: Knowledge Deficit  Goal: Patient/family/caregiver demonstrates understanding of disease process, treatment plan, medications, and discharge  instructions  Description: Complete learning assessment and assess knowledge base.  Interventions:  - Provide teaching at level of understanding  - Provide teaching via preferred learning methods  Outcome: Progressing     Problem: SLEEP DISTURBANCE  Goal: Will exhibit normal sleeping pattern  Description: Interventions:  -  Assess the patients sleep pattern, noting recent changes  - Administer medication as ordered  - Decrease environmental stimuli, including noise, as appropriate during the night  - Encourage the patient to actively participate in unit groups and or exercise during the day to enhance ability to achieve adequate sleep at night  - Assess the patient, in the morning, encouraging a description of sleep experience  Outcome: Progressing

## 2025-03-11 PROCEDURE — 99232 SBSQ HOSP IP/OBS MODERATE 35: CPT | Performed by: PSYCHIATRY & NEUROLOGY

## 2025-03-11 RX ADMIN — ATORVASTATIN CALCIUM 40 MG: 40 TABLET, FILM COATED ORAL at 17:06

## 2025-03-11 RX ADMIN — CYANOCOBALAMIN TAB 1000 MCG 1000 MCG: 1000 TAB at 08:11

## 2025-03-11 RX ADMIN — FAMOTIDINE 20 MG: 20 TABLET ORAL at 08:11

## 2025-03-11 RX ADMIN — SENNOSIDES AND DOCUSATE SODIUM 2 TABLET: 50; 8.6 TABLET ORAL at 17:06

## 2025-03-11 RX ADMIN — FAMOTIDINE 20 MG: 20 TABLET ORAL at 17:06

## 2025-03-11 RX ADMIN — ARIPIPRAZOLE 5 MG: 5 TABLET ORAL at 08:11

## 2025-03-11 RX ADMIN — Medication 2500 UNITS: at 08:11

## 2025-03-11 RX ADMIN — SERTRALINE HYDROCHLORIDE 150 MG: 100 TABLET ORAL at 08:11

## 2025-03-11 NOTE — PLAN OF CARE
Problem: Ineffective Coping  Goal: Participates in unit activities  Description: Interventions:  - Provide therapeutic environment   - Provide required programming   - Redirect inappropriate behaviors   Outcome: Progressing     Problem: Depression  Goal: Treatment Goal: Demonstrate behavioral control of depressive symptoms, verbalize feelings of improved mood/affect, and adopt new coping skills prior to discharge  Outcome: Progressing  Goal: Verbalize thoughts and feelings  Description: Interventions:  - Assess and re-assess patient's level of risk   - Engage patient in 1:1 interactions, daily, for a minimum of 15 minutes   - Encourage patient to express feelings, fears, frustrations, hopes   Outcome: Progressing  Goal: Refrain from harming self  Description: Interventions:  - Monitor patient closely, per order   - Supervise medication ingestion, monitor effects and side effects   Outcome: Progressing  Goal: Refrain from isolation  Description: Interventions:  - Develop a trusting relationship   - Encourage socialization   Outcome: Progressing     Problem: Anxiety  Goal: Anxiety is at manageable level  Description: Interventions:  - Assess and monitor patient's anxiety level.   - Monitor for signs and symptoms (heart palpitations, chest pain, shortness of breath, headaches, nausea, feeling jumpy, restlessness, irritable, apprehensive).   - Collaborate with interdisciplinary team and initiate plan and interventions as ordered.  - [place] patient to unit/surroundings  - Explain treatment plan  - Encourage participation in care  - Encourage verbalization of concerns/fears  - Identify coping mechanisms  - Assist in developing anxiety-reducing skills  - Administer/offer alternative therapies  - Limit or eliminate stimulants  Outcome: Progressing     Problem: Knowledge Deficit  Goal: Patient/family/caregiver demonstrates understanding of disease process, treatment plan, medications, and discharge  instructions  Description: Complete learning assessment and assess knowledge base.  Interventions:  - Provide teaching at level of understanding  - Provide teaching via preferred learning methods  Outcome: Progressing     Problem: SLEEP DISTURBANCE  Goal: Will exhibit normal sleeping pattern  Description: Interventions:  -  Assess the patients sleep pattern, noting recent changes  - Administer medication as ordered  - Decrease environmental stimuli, including noise, as appropriate during the night  - Encourage the patient to actively participate in unit groups and or exercise during the day to enhance ability to achieve adequate sleep at night  - Assess the patient, in the morning, encouraging a description of sleep experience  Outcome: Progressing

## 2025-03-11 NOTE — TREATMENT PLAN
TREATMENT PLAN REVIEW - Behavioral Health Franco Roberson 39 y.o. 1985 male MRN: [id number]    [facility] 3B BEHAVIORAL [facility] Room / Bed: [facility] 342/[facility] 342-01 Encounter: [id number]          Admit Date/Time:  5/14/2024  4:45 PM    Treatment Team:   MD Isaac Heard RN Elana Roman Rebecca Sussman Jennifer King    Diagnosis: Principal Problem:    MDD (major depressive disorder), recurrent severe, without psychosis (HCC)  Active Problems:    Unspecified depressive disorder (HCC)    Medical clearance for psychiatric admission    Hyperlipidemia    Vitamin D deficiency    B12 deficiency    Hypothyroidism    Autism spectrum disorder      Patient Strengths/Assets: cooperative, negotiates basic needs, patient is on a voluntary commitment, patient is willing to work on problems    Patient Barriers/Limitations: difficulty adapting, immature, lack of financial means, lack of social/family support, lack of stable employment, limited insight, limited support system, poor insight, unstable housing situation    Short Term Goals: decrease in depressive symptoms, mood stabilization    Long Term Goals: improvement in depression, adequate self care, adequate sleep, adequate appetite, adequate oral intake, appropriate interaction with peers, stable living arrangements upon discharge    Progress Towards Goals: progressing, mood is stabilizing, depression is improving, placement pending    Recommended Treatment: medication management, patient medication education, group therapy, milieu therapy, continued Behavioral Health psychiatric evaluation/assessment process    Treatment Frequency: daily medication monitoring, group and milieu therapy daily, monitoring through interdisciplinary rounds, monitoring through weekly patient care conferences    Expected Discharge Date:  Greater than 2 midnights    Discharge  Plan: placement in alternative living arrangement, placement in group home, referrals as indicated    Treatment Plan Created/Updated By: Dustin Mcallister MD

## 2025-03-11 NOTE — PROGRESS NOTES
03/11/25 0841   Team Meeting   Meeting Type Daily Rounds   Team Members Present   Team Members Present Physician;Nurse;   Physician Team Member Ary   [facility]ing [facility] Noa   Patient/Family Present   Patient Present No   Patient's Family Present No     Pt is calm and cooperative. Pt is medication and meal compliant. Pt denies SI/HI/AVH. Pt's discharge is pending placement.

## 2025-03-11 NOTE — ASSESSMENT & PLAN NOTE
"  Patient continues to do well and the plan is to continue with the current treatment plan as mentioned below with no changes.    Patient continues to be pending group home placement. Otherwise no clinical significant change.   continues to look for group home/personal-care home placement however this can be significantly impacted by patient no longer having money in the ABLE account and his bank account being over drawn.  Patient is quite altruistic in his desire not to have his friend go to prison however continues to display poor to limited insight regarding his ability to advocate for himself and organize/prioritize his own needs given his own difficult social situation. Would be at significant risk of harm if not discharged to a structured setting with support.    Medication regimen as follows, no changes as of 3/11/2025:  Abilify 5 mg daily as mood adjunct   Zoloft 150 mg daily for depression and anxiety  Continue to encourage participation in group therapy, milieu therapy and occupational therapy.  Continue to assess for side effects of medications.  Continue collaboration with SLIM for medical co-morbidities as indicated.  Continue discussion with CM/SW to assist with obtaining collateral, disposition planning, and the implementation of patient-centered individualized plan of care.  Continue frequent safety checks and vitals per unit protocol.    Risks, benefits and possible side effects of Medications: Risks, benefits, and possible side effects of medications have previously been explained. No new medications at this time.      Legal status: 201    Disposition: to be determined, pending SOAR application for potential group home placement. OT Cognitive Evaluation completed: \"Pt would benefit from discharge to a supportive environment that can provide checks for safety and compliance with IADLs. \"   Although patient's mood has stabilized, they are currently awaiting group home placement.  Their " ability to recognize safety hazards take medications and participate in IADLs are significantly impaired by their cognitive deficits compounded by their chronic mental health needs and would be at risk for significant decompensation without the structure and support of a group home setting.      No associated orders from this encounter found during lookback period of 72 hours.

## 2025-03-11 NOTE — NURSING NOTE
Patient has been seclusive to his room. Only out for breakfast. Compliant with medications. Denies all symptoms. Pleasant, calm and cooperative.

## 2025-03-11 NOTE — PROGRESS NOTES
Progress Note - Behavioral Health   Name: Franco Roberson 39 y.o. male I MRN: [id number]  Unit/Bed#: -01 I Date of Admission: 5/14/2024   Date of Service: 3/11/2025 I Hospital Day: 301     Assessment & Plan  MDD (major depressive disorder), recurrent severe, without psychosis (HCC)    Patient continues to do well and the plan is to continue with the current treatment plan as mentioned below with no changes.    Patient continues to be pending group home placement. Otherwise no clinical significant change.   continues to look for group home/personal-care home placement however this can be significantly impacted by patient no longer having money in the ABLE account and his bank account being over drawn.  Patient is quite altruistic in his desire not to have his friend go to assisted however continues to display poor to limited insight regarding his ability to advocate for himself and organize/prioritize his own needs given his own difficult social situation. Would be at significant risk of harm if not discharged to a structured setting with support.    Medication regimen as follows, no changes as of 3/11/2025:  Abilify 5 mg daily as mood adjunct   Zoloft 150 mg daily for depression and anxiety  Continue to encourage participation in group therapy, milieu therapy and occupational therapy.  Continue to assess for side effects of medications.  Continue collaboration with PATRICIA for medical co-morbidities as indicated.  Continue discussion with CM/SW to assist with obtaining collateral, disposition planning, and the implementation of patient-centered individualized plan of care.  Continue frequent safety checks and vitals per unit protocol.    Risks, benefits and possible side effects of Medications: Risks, benefits, and possible side effects of medications have previously been explained. No new medications at this time.      Legal status: 201    Disposition: to be determined, pending SOAR application for  "potential group home placement. OT Cognitive Evaluation completed: \"Pt would benefit from discharge to a supportive environment that can provide checks for safety and compliance with IADLs. \"   Although patient's mood has stabilized, they are currently awaiting group home placement.  Their ability to recognize safety hazards take medications and participate in IADLs are significantly impaired by their cognitive deficits compounded by their chronic mental health needs and would be at risk for significant decompensation without the structure and support of a group home setting.      No associated orders from this encounter found during lookback period of 72 hours.  Autism spectrum disorder  Continue supportive care    No associated orders from this encounter found during lookback period of 72 hours.    Hyperlipidemia  - Lipitor 40 mg       Plan     Recommended Treatment:    - Encourage early mobility and having a structured day  - Provide frequent re-orientation, and cognitive stimulation  - Ensure assistive devices are in proper working order (eye-glasses, hearing aids)  - Encourage adequate hydration, nutrition and monitor bowel movements  - Maintain sleep-wake cycle: Uninterrupted sleep time; low-level lighting at night  - Fall precaution  - f/u SLIM recs regarding the medical problems   - Continue medication titration and treatment plan; adjust medication to optimize treatment response and as clinically indicated. .   - Observation: routine            - VS: as per unit protocol  - Diet: Regular diet  - Encourage group attendance and milieu therapy  - Dispo: To be determined     Scheduled medications:  Current Facility-Administered Medications   Medication Dose Route Frequency Provider Last Rate    acetaminophen  650 mg Oral Q6H PRN Basilio Brown MD      acetaminophen  650 mg Oral Q4H PRN Basilio Brown MD      acetaminophen  975 mg Oral Q6H PRN Basilio Brown MD      aluminum-magnesium " hydroxide-simethicone  30 mL Oral Q4H PRN Basilio Brown MD      ARIPiprazole  5 mg Oral Daily Basilio Panda MD      Artificial Tears  1 drop Both Eyes Q3H PRN Basilio Brown MD      atorvastatin  40 mg Oral Daily With Dinner WALLY Baptiste      benztropine  1 mg Intramuscular Q4H PRN Max 6/day Basilio Brown MD      benztropine  1 mg Oral Q4H PRN Max 6/day Basilio Brown MD      Cholecalciferol  2,500 Units Oral Daily Dustin Mcallister MD      cyanocobalamin  1,000 mcg Oral Daily WALLY Baptiste      Diclofenac Sodium  2 g Topical 4x Daily PRN WALLY Baptiste      hydrOXYzine HCL  50 mg Oral Q6H PRN Max 4/day Basilio Brown MD      Or    diphenhydrAMINE  50 mg Intramuscular Q6H PRN Basilio Brown MD      diphenhydrAMINE-zinc acetate   Topical Daily PRN Raj Guerrero MD      famotidine  20 mg Oral BID WALLY Baptiste      hydrOXYzine HCL  100 mg Oral Q6H PRN Max 4/day Basilio Brown MD      Or    LORazepam  2 mg Intramuscular Q6H PRN Basilio Brown MD      hydrOXYzine HCL  25 mg Oral Q6H PRN Max 4/day Basilio Brown MD      levothyroxine  37.5 mcg Oral Early Morning WALLY Baptiste      melatonin  6 mg Oral HS PRN WALLY Gomez      methocarbamol  500 mg Oral Q6H PRN WALLY Baptiste      OLANZapine  5 mg Oral Q4H PRN Max 3/day Basilio Brown MD      Or    OLANZapine  2.5 mg Intramuscular Q4H PRN Max 3/day Basilio Brown MD      OLANZapine  5 mg Oral Q3H PRN Max 3/day Basilio Brown MD      Or    OLANZapine  5 mg Intramuscular Q3H PRN Max 3/day Basilio Brown MD      OLANZapine  2.5 mg Oral Q4H PRN Max 6/day Basilio Brown MD      polyethylene glycol  17 g Oral Daily PRN Basilio Brown MD      propranolol  10 mg Oral Q8H PRN Basilio Brown MD      senna-docusate sodium  2 tablet Oral After Dinner Dustin Mcallister MD      sertraline  150 mg Oral  "Daily WALLY Gomez      traZODone  50 mg Oral HS PRN Basilio Brown MD        PRN:    acetaminophen    acetaminophen    acetaminophen    aluminum-magnesium hydroxide-simethicone    Artificial Tears    benztropine    benztropine    Diclofenac Sodium    hydrOXYzine HCL **OR** diphenhydrAMINE    diphenhydrAMINE-zinc acetate    hydrOXYzine HCL **OR** LORazepam    hydrOXYzine HCL    melatonin    methocarbamol    OLANZapine **OR** OLANZapine    OLANZapine **OR** OLANZapine    OLANZapine    polyethylene glycol    propranolol    traZODone       Subjective     Patient was visited on unit for continuing care; chart reviewed and discussed with multidisciplinary treatment team.  On approach, the patient was calm and cooperative. Denied any changes in mood, appetite, and energy level. No problem initiating and maintaining sleep.  Denied A/VH currently.  Denied SI/HI, intent or plan upon direct inquiry at this time.  No acute complaints or concerns at this time.  Understands that referrals are out for group home/personal care placement and that these will likely take some time to hear back from.    Patient continues to be visible in the milieu and interacts with staff and peers. No reports of aggression or self-injurious behavior on unit. No PRN medications used in the past 24 hours.    Patient accepted all offered medications and no adverse effects of medications noted or reported.    Objective    Current Mental Status Evaluation:  Appearance: casually dressed, consistent with stated age  Motor: no psychomotor retardation, no gait abnormalities  Behavior: cooperative, answers questions appropriately  Speech: soft, normal rhythm  Mood: \"ok\"  Affect: blunted  Thought Process: concrete  Thought Content: denies delusions  Risk Potential: denies suicidal ideation, plan, or intent. Denies homicidal ideation  Perceptions: denies auditory hallucinations, denies visual hallucinations,   Sensorium: Oriented to person, " place, time, and situation  Cognition: cognitive ability appears intact but was not quantitatively tested  Consciousness: alert and awake  Attention: intact, able to focus without difficulty  Insight: limited  Judgement: limited            Vital signs in last 24 hours:    Temp:  [97.3 °F (36.3 °C)] 97.3 °F (36.3 °C)  HR:  [80-88] 88  BP: (114-126)/(76-93) 126/93  Resp:  [16-18] 16  SpO2:  [93 %-96 %] 96 %  O2 Device: None (Room air)    Psychiatric Review of Systems  Medication Adverse Effects: see HPI  Behaviors: see HPI  Sleep: unchanged  Appetite: unchanged    Laboratory results:    I have personally reviewed all pertinent laboratory/tests results  No results found for this or any previous visit (from the past 48 hours).       Progress Toward Goals & Illness Status:   progressing, attends groups, participates in milieu therapy, discharge planning, placement pending    Patient is not at goal. They are not yet ready for discharge. The patient's condition currently requires active psychopharmacological medication management, interdisciplinary coordination with case management, and the utilization of adjunctive milieu and group therapy to augment psychopharmacological efficacy. The patient's risk of morbidity, and progression or decompensation of psychiatric disease, is higher without this current treatment.     Next of Kin  Extended Emergency Contact Information  Primary Emergency Contact: Lois Roberson  Address: [address] [place] [address] of [place]  Home Phone: [phone]  Relation: Mother    Counseling / Coordination of Care  Patient's progress discussed with staff in treatment team meeting.  Medications, treatment progress and treatment plan reviewed with patient.  Medication education provided to patient.  Educated on importance of medication and treatment compliance.  Supportive therapy provided to patient.  Cognitive techniques utilized during the  session.  Reassurance and supportive therapy provided.  Reoriented to reality and reassured.  Encouraged participation in milieu and group therapy on the unit.  Crisis/safety plan discussed with patient.       Dustin Mcallister MD  Attending Psychiatrist   [facility]      This note has been constructed using a voice recognition system. There may be translation, syntax, or grammatical errors. If you have any questions, please contact the dictating provider.

## 2025-03-12 PROCEDURE — 99232 SBSQ HOSP IP/OBS MODERATE 35: CPT | Performed by: PSYCHIATRY & NEUROLOGY

## 2025-03-12 RX ADMIN — FAMOTIDINE 20 MG: 20 TABLET ORAL at 17:18

## 2025-03-12 RX ADMIN — FAMOTIDINE 20 MG: 20 TABLET ORAL at 08:08

## 2025-03-12 RX ADMIN — LEVOTHYROXINE SODIUM 37.5 MCG: 0.07 TABLET ORAL at 06:43

## 2025-03-12 RX ADMIN — ARIPIPRAZOLE 5 MG: 5 TABLET ORAL at 08:08

## 2025-03-12 RX ADMIN — CYANOCOBALAMIN TAB 1000 MCG 1000 MCG: 1000 TAB at 08:08

## 2025-03-12 RX ADMIN — SENNOSIDES AND DOCUSATE SODIUM 2 TABLET: 50; 8.6 TABLET ORAL at 17:18

## 2025-03-12 RX ADMIN — Medication 2500 UNITS: at 08:07

## 2025-03-12 RX ADMIN — ATORVASTATIN CALCIUM 40 MG: 40 TABLET, FILM COATED ORAL at 17:18

## 2025-03-12 RX ADMIN — SERTRALINE HYDROCHLORIDE 150 MG: 100 TABLET ORAL at 08:08

## 2025-03-12 NOTE — PLAN OF CARE
Problem: Ineffective Coping  Goal: Participates in unit activities  Description: Interventions:  - Provide therapeutic environment   - Provide required programming   - Redirect inappropriate behaviors   Outcome: Progressing     Problem: Depression  Goal: Treatment Goal: Demonstrate behavioral control of depressive symptoms, verbalize feelings of improved mood/affect, and adopt new coping skills prior to discharge  Outcome: Progressing  Goal: Verbalize thoughts and feelings  Description: Interventions:  - Assess and re-assess patient's level of risk   - Engage patient in 1:1 interactions, daily, for a minimum of 15 minutes   - Encourage patient to express feelings, fears, frustrations, hopes   Outcome: Progressing  Goal: Refrain from harming self  Description: Interventions:  - Monitor patient closely, per order   - Supervise medication ingestion, monitor effects and side effects   Outcome: Progressing  Goal: Refrain from isolation  Description: Interventions:  - Develop a trusting relationship   - Encourage socialization   Outcome: Progressing     Problem: Anxiety  Goal: Anxiety is at manageable level  Description: Interventions:  - Assess and monitor patient's anxiety level.   - Monitor for signs and symptoms (heart palpitations, chest pain, shortness of breath, headaches, nausea, feeling jumpy, restlessness, irritable, apprehensive).   - Collaborate with interdisciplinary team and initiate plan and interventions as ordered.  - [place] patient to unit/surroundings  - Explain treatment plan  - Encourage participation in care  - Encourage verbalization of concerns/fears  - Identify coping mechanisms  - Assist in developing anxiety-reducing skills  - Administer/offer alternative therapies  - Limit or eliminate stimulants  Outcome: Progressing     Problem: DISCHARGE PLANNING - CARE MANAGEMENT  Goal: Discharge to post-acute care or home with appropriate resources  Description: INTERVENTIONS:  - Conduct assessment to  determine patient/family and health care team treatment goals, and need for post-acute services based on payer coverage, community resources, and patient preferences, and barriers to discharge  - Address psychosocial, clinical, and financial barriers to discharge as identified in assessment in conjunction with the patient/family and health care team  - Arrange appropriate level of post-acute services according to patient’s   needs and preference and payer coverage in collaboration with the physician and health care team  - Communicate with and update the patient/family, physician, and health care team regarding progress on the discharge plan  - Arrange appropriate transportation to post-acute venues  Outcome: Progressing     Problem: Knowledge Deficit  Goal: Patient/family/caregiver demonstrates understanding of disease process, treatment plan, medications, and discharge instructions  Description: Complete learning assessment and assess knowledge base.  Interventions:  - Provide teaching at level of understanding  - Provide teaching via preferred learning methods  Outcome: Progressing     Problem: SLEEP DISTURBANCE  Goal: Will exhibit normal sleeping pattern  Description: Interventions:  -  Assess the patients sleep pattern, noting recent changes  - Administer medication as ordered  - Decrease environmental stimuli, including noise, as appropriate during the night  - Encourage the patient to actively participate in unit groups and or exercise during the day to enhance ability to achieve adequate sleep at night  - Assess the patient, in the morning, encouraging a description of sleep experience  Outcome: Progressing

## 2025-03-12 NOTE — NURSING NOTE
Pleasant, calm and cooperative during interaction. Has been seclusive to his room as of this time. Denies symptoms. No complaints. Compliant with medications. Waiting placement.

## 2025-03-12 NOTE — NURSING NOTE
Patient is calm and cooperative upon approach. Patient is isolative to self and room, but visible at times. Patient denies SI/HI/AH/VH. Patient is compliant with meds and meals.

## 2025-03-12 NOTE — PROGRESS NOTES
03/12/25 0838   Team Meeting   Meeting Type Daily Rounds   Team Members Present   Team Members Present Physician;Nurse;   Physician Team Member Ary   [facility]   Care Management Team Member Noa   Patient/Family Present   Patient Present No   Patient's Family Present No     Pt is medication and meal compliant. Pt deneis SI/HI/AVH. Pt is calm and cooperative. Pt is visible walking on the unit and attending select groups. Pt's discharge is pending personal care home.

## 2025-03-12 NOTE — PROGRESS NOTES
Progress Note - Behavioral Health   Name: Franco Roberson 39 y.o. male I MRN: [id number]  Unit/Bed#: -01 I Date of Admission: 5/14/2024   Date of Service: 3/12/2025 I Hospital Day: 302     Assessment & Plan  MDD (major depressive disorder), recurrent severe, without psychosis (HCC)    Patient continues to do well and the plan is to continue with the current treatment plan as mentioned below with no changes.    Patient continues to be pending group home placement. Otherwise no clinical significant change.   continues to look for group home/personal-care home placement however this can be significantly impacted by patient no longer having money in the [employer] account and his bank account being over drawn.  Patient is quite altruistic in his desire not to have his friend go to [facility] however continues to display poor to limited insight regarding his ability to advocate for himself and organize/prioritize his own needs given his own difficult social situation. Would be at significant risk of harm if not discharged to a structured setting with support.    Medication regimen as follows, no changes as of 3/12/2025:  Abilify 5 mg daily as mood adjunct   Zoloft 150 mg daily for depression and anxiety  Continue to encourage participation in group therapy, milieu therapy and occupational therapy.  Continue to assess for side effects of medications.  Continue collaboration with PATRICIA for medical co-morbidities as indicated.  Continue discussion with CM/SW to assist with obtaining collateral, disposition planning, and the implementation of patient-centered individualized plan of care.  Continue frequent safety checks and vitals per unit protocol.    Risks, benefits and possible side effects of Medications: Risks, benefits, and possible side effects of medications have previously been explained. No new medications at this time.      Legal status: 201    Disposition: to be determined, pending SOAR application for  "potential group home placement. OT Cognitive Evaluation completed: \"Pt would benefit from discharge to a supportive environment that can provide checks for safety and compliance with IADLs. \"   Although patient's mood has stabilized, they are currently awaiting group home placement.  Their ability to recognize safety hazards take medications and participate in IADLs are significantly impaired by their cognitive deficits compounded by their chronic mental health needs and would be at risk for significant decompensation without the structure and support of a group home setting.      No associated orders from this encounter found during lookback period of 72 hours.  Autism spectrum disorder  Continue supportive care    No associated orders from this encounter found during lookback period of 72 hours.    Hyperlipidemia  - Lipitor 40 mg       Plan     Recommended Treatment:    - Encourage early mobility and having a structured day  - Provide frequent re-orientation, and cognitive stimulation  - Ensure assistive devices are in proper working order (eye-glasses, hearing aids)  - Encourage adequate hydration, nutrition and monitor bowel movements  - Maintain sleep-wake cycle: Uninterrupted sleep time; low-level lighting at night  - Fall precaution  - f/u [facility] recs regarding the medical problems   - Continue medication titration and treatment plan; adjust medication to optimize treatment response and as clinically indicated. .   - Observation: routine            - VS: as per unit protocol  - Diet: Regular diet  - Encourage group attendance and milieu therapy  - Dispo: To be determined     Scheduled medications:  Current Facility-Administered Medications   Medication Dose Route Frequency Provider Last Rate    acetaminophen  650 mg Oral Q6H PRN Basilio Brown MD      acetaminophen  650 mg Oral Q4H PRN Basilio Brown MD      acetaminophen  975 mg Oral Q6H PRN Basilio Brown MD      aluminum-magnesium " hydroxide-simethicone  30 mL Oral Q4H PRN Basilio Brown MD      ARIPiprazole  5 mg Oral Daily Basilio Panda MD      Artificial Tears  1 drop Both Eyes Q3H PRN Basilio Brown MD      atorvastatin  40 mg Oral Daily With Dinner WALLY Baptiste      benztropine  1 mg Intramuscular Q4H PRN Max 6/day Basilio Brown MD      benztropine  1 mg Oral Q4H PRN Max 6/day Basilio rBown MD      Cholecalciferol  2,500 Units Oral Daily Dustin Mcallister MD      cyanocobalamin  1,000 mcg Oral Daily WALLY Baptiste      Diclofenac Sodium  2 g Topical 4x Daily PRN WALLY Baptiste      hydrOXYzine HCL  50 mg Oral Q6H PRN Max 4/day Basilio Brown MD      Or    diphenhydrAMINE  50 mg Intramuscular Q6H PRN Basilio Brown MD      diphenhydrAMINE-zinc acetate   Topical Daily PRN Raj Guerrero MD      famotidine  20 mg Oral BID WALLY Baptiste      hydrOXYzine HCL  100 mg Oral Q6H PRN Max 4/day Basilio Brown MD      Or    LORazepam  2 mg Intramuscular Q6H PRN Basilio Brown MD      hydrOXYzine HCL  25 mg Oral Q6H PRN Max 4/day Basilio Brown MD      levothyroxine  37.5 mcg Oral Early Morning WALLY Baptiste      melatonin  6 mg Oral HS PRN WALLY Gomez      methocarbamol  500 mg Oral Q6H PRN WALLY Baptiste      OLANZapine  5 mg Oral Q4H PRN Max 3/day Basilio Brown MD      Or    OLANZapine  2.5 mg Intramuscular Q4H PRN Max 3/day Basilio Brown MD      OLANZapine  5 mg Oral Q3H PRN Max 3/day Basilio Brown MD      Or    OLANZapine  5 mg Intramuscular Q3H PRN Max 3/day Basilio Brown MD      OLANZapine  2.5 mg Oral Q4H PRN Max 6/day Basilio Brown MD      polyethylene glycol  17 g Oral Daily PRN Basilio Brown MD      propranolol  10 mg Oral Q8H PRN Basilio Brown MD      senna-docusate sodium  2 tablet Oral After Dinner Dustin Mcallisetr MD      sertraline  150 mg Oral  "Daily WALLY Gomez      traZODone  50 mg Oral HS PRN Basilio Brown MD        PRN:    acetaminophen    acetaminophen    acetaminophen    aluminum-magnesium hydroxide-simethicone    Artificial Tears    benztropine    benztropine    Diclofenac Sodium    hydrOXYzine HCL **OR** diphenhydrAMINE    diphenhydrAMINE-zinc acetate    hydrOXYzine HCL **OR** LORazepam    hydrOXYzine HCL    melatonin    methocarbamol    OLANZapine **OR** OLANZapine    OLANZapine **OR** OLANZapine    OLANZapine    polyethylene glycol    propranolol    traZODone       Subjective     Patient was visited on unit for continuing care; chart reviewed and discussed with multidisciplinary treatment team.  On approach, the patient was calm and cooperative. Denied any changes in mood, appetite, and energy level. No problem initiating and maintaining sleep.  Denied A/VH currently.  Denied SI/HI, intent or plan upon direct inquiry at this time. Pt seen walking the [place] today for exercise.    Patient continues to be visible in the milieu and interacts with staff and peers. No reports of aggression or self-injurious behavior on unit. No PRN medications used in the past 24 hours.    Patient accepted all offered medications and no adverse effects of medications noted or reported.    Objective    Current Mental Status Evaluation:  Appearance: casually dressed, consistent with stated age  Motor: no psychomotor retardation, no gait abnormalities  Behavior: cooperative, answers questions appropriately  Speech: soft, normal rhythm  Mood: \"ok\"  Affect: blunted  Thought Process: concrete  Thought Content: denies delusions  Risk Potential: denies suicidal ideation, plan, or intent. Denies homicidal ideation  Perceptions: denies auditory hallucinations, denies visual hallucinations,   Sensorium: Oriented to person, place, time, and situation  Cognition: cognitive ability appears intact but was not quantitatively tested  Consciousness: alert and " awake  Attention: intact, able to focus without difficulty  Insight: limited  Judgement: limited          Vital signs in last 24 hours:    Temp:  [98.7 °F (37.1 °C)] 98.7 °F (37.1 °C)  HR:  [84-86] 84  BP: (107-117)/(69-80) 107/69  Resp:  [18] 18  SpO2:  [96 %] 96 %  O2 Device: None (Room air)    Psychiatric Review of Systems  Medication Adverse Effects: see HPI  Behaviors: see HPI  Sleep: unchanged  Appetite: unchanged    Laboratory results:    I have personally reviewed all pertinent laboratory/tests results  No results found for this or any previous visit (from the past 48 hours).       Progress Toward Goals & Illness Status:   progressing, attends groups, participates in milieu therapy, mood is stabilizing, placement pending    Patient is not at goal. They are not yet ready for discharge. The patient's condition currently requires active psychopharmacological medication management, interdisciplinary coordination with case management, and the utilization of adjunctive milieu and group therapy to augment psychopharmacological efficacy. The patient's risk of morbidity, and progression or decompensation of psychiatric disease, is higher without this current treatment.     Next of Kin  Extended Emergency Contact Information  Primary Emergency Contact: Lois Roberson  Address: [address] [place] [address] of [place]  Home Phone: [phone]  Relation: Mother    Counseling / Coordination of Care  Patient's progress discussed with staff in treatment team meeting.  Medications, treatment progress and treatment plan reviewed with patient.  Medication education provided to patient.  Educated on importance of medication and treatment compliance.  Supportive therapy provided to patient.  Cognitive techniques utilized during the session.  Reassurance and supportive therapy provided.  Reoriented to reality and reassured.  Encouraged participation in milieu and group therapy on the  unit.  Crisis/safety plan discussed with patient.       Dustin Mcallister MD  Attending Psychiatrist   [facility]      This note has been constructed using a voice recognition system. There may be translation, syntax, or grammatical errors. If you have any questions, please contact the dictating provider.

## 2025-03-12 NOTE — ASSESSMENT & PLAN NOTE
"  Patient continues to do well and the plan is to continue with the current treatment plan as mentioned below with no changes.    Patient continues to be pending group home placement. Otherwise no clinical significant change.   continues to look for group home/personal-care home placement however this can be significantly impacted by patient no longer having money in the [employer] account and his bank account being over drawn.  Patient is quite altruistic in his desire not to have his friend go to long-term however continues to display poor to limited insight regarding his ability to advocate for himself and organize/prioritize his own needs given his own difficult social situation. Would be at significant risk of harm if not discharged to a structured setting with support.    Medication regimen as follows, no changes as of 3/12/2025:  Abilify 5 mg daily as mood adjunct   Zoloft 150 mg daily for depression and anxiety  Continue to encourage participation in group therapy, milieu therapy and occupational therapy.  Continue to assess for side effects of medications.  Continue collaboration with [facility] for medical co-morbidities as indicated.  Continue discussion with CM/SW to assist with obtaining collateral, disposition planning, and the implementation of patient-centered individualized plan of care.  Continue frequent safety checks and vitals per unit protocol.    Risks, benefits and possible side effects of Medications: Risks, benefits, and possible side effects of medications have previously been explained. No new medications at this time.      Legal status: 201    Disposition: to be determined, pending SOAR application for potential group home placement. OT Cognitive Evaluation completed: \"Pt would benefit from discharge to a supportive environment that can provide checks for safety and compliance with IADLs. \"   Although patient's mood has stabilized, they are currently awaiting group home placement.  Their " ability to recognize safety hazards take medications and participate in IADLs are significantly impaired by their cognitive deficits compounded by their chronic mental health needs and would be at risk for significant decompensation without the structure and support of a group home setting.      No associated orders from this encounter found during lookback period of 72 hours.

## 2025-03-13 PROCEDURE — 99232 SBSQ HOSP IP/OBS MODERATE 35: CPT | Performed by: PSYCHIATRY & NEUROLOGY

## 2025-03-13 RX ADMIN — FAMOTIDINE 20 MG: 20 TABLET ORAL at 08:09

## 2025-03-13 RX ADMIN — Medication 2500 UNITS: at 08:09

## 2025-03-13 RX ADMIN — LEVOTHYROXINE SODIUM 37.5 MCG: 0.07 TABLET ORAL at 06:33

## 2025-03-13 RX ADMIN — SERTRALINE HYDROCHLORIDE 150 MG: 100 TABLET ORAL at 08:09

## 2025-03-13 RX ADMIN — SENNOSIDES AND DOCUSATE SODIUM 2 TABLET: 50; 8.6 TABLET ORAL at 17:28

## 2025-03-13 RX ADMIN — CYANOCOBALAMIN TAB 1000 MCG 1000 MCG: 1000 TAB at 08:09

## 2025-03-13 RX ADMIN — ATORVASTATIN CALCIUM 40 MG: 40 TABLET, FILM COATED ORAL at 17:28

## 2025-03-13 RX ADMIN — FAMOTIDINE 20 MG: 20 TABLET ORAL at 17:28

## 2025-03-13 RX ADMIN — ARIPIPRAZOLE 5 MG: 5 TABLET ORAL at 08:09

## 2025-03-13 NOTE — ASSESSMENT & PLAN NOTE
"  Patient continues to do well and the plan is to continue with the current treatment plan as mentioned below with no changes.    Patient continues to be pending group home placement. Otherwise no clinical significant change.   continues to look for group home/personal-care home placement however this can be significantly impacted by patient no longer having money in the [employer] account and his bank account being over drawn.  Patient is quite altruistic in his desire not to have his friend go to senior living however continues to display poor to limited insight regarding his ability to advocate for himself and organize/prioritize his own needs given his own difficult social situation. Would be at significant risk of harm if not discharged to a structured setting with support.    Medication regimen as follows, no changes as of 3/13/2025:  Abilify 5 mg daily as mood adjunct   Zoloft 150 mg daily for depression and anxiety  Continue to encourage participation in group therapy, milieu therapy and occupational therapy.  Continue to assess for side effects of medications.  Continue collaboration with [facility] for medical co-morbidities as indicated.  Continue discussion with CM/SW to assist with obtaining collateral, disposition planning, and the implementation of patient-centered individualized plan of care.  Continue frequent safety checks and vitals per unit protocol.    Risks, benefits and possible side effects of Medications: Risks, benefits, and possible side effects of medications have previously been explained. No new medications at this time.      Legal status: 201    Disposition: to be determined, pending SOAR application for potential group home placement. OT Cognitive Evaluation completed: \"Pt would benefit from discharge to a supportive environment that can provide checks for safety and compliance with IADLs. \"   Although patient's mood has stabilized, they are currently awaiting group home placement.  Their " ability to recognize safety hazards take medications and participate in IADLs are significantly impaired by their cognitive deficits compounded by their chronic mental health needs and would be at risk for significant decompensation without the structure and support of a group home setting.      No associated orders from this encounter found during lookback period of 72 hours.

## 2025-03-13 NOTE — NURSING NOTE
A bit visible at the start of the shift. Doing laps in the hallway alone. Denies symptoms. Cooperative and pleasant. Waiting placement.

## 2025-03-13 NOTE — PROGRESS NOTES
03/13/25 1020   Team Meeting   Meeting Type Daily Rounds   Team Members Present   Team Members Present Physician;Nurse;   Physician Team Member Ary   [facility]   Care Management Team Member Noa/Helene   Patient/Family Present   Patient Present No   Patient's Family Present No     Pt was med and meal compliant. Pt was out of his room today. Pt is calm and cooperative.

## 2025-03-13 NOTE — PROGRESS NOTES
Progress Note - Behavioral Health   Name: Franco Roberson 39 y.o. male I MRN: [id number]  Unit/Bed#: -01 I Date of Admission: 5/14/2024   Date of Service: 3/13/2025 I Hospital Day: 303     Assessment & Plan  MDD (major depressive disorder), recurrent severe, without psychosis (HCC)    Patient continues to do well and the plan is to continue with the current treatment plan as mentioned below with no changes.    Patient continues to be pending group home placement. Otherwise no clinical significant change.   continues to look for group home/personal-care home placement however this can be significantly impacted by patient no longer having money in the [employer] account and his bank account being over drawn.  Patient is quite altruistic in his desire not to have his friend go to senior care however continues to display poor to limited insight regarding his ability to advocate for himself and organize/prioritize his own needs given his own difficult social situation. Would be at significant risk of harm if not discharged to a structured setting with support.    Medication regimen as follows, no changes as of 3/13/2025:  Abilify 5 mg daily as mood adjunct   Zoloft 150 mg daily for depression and anxiety  Continue to encourage participation in group therapy, milieu therapy and occupational therapy.  Continue to assess for side effects of medications.  Continue collaboration with PATRICIA for medical co-morbidities as indicated.  Continue discussion with CM/SW to assist with obtaining collateral, disposition planning, and the implementation of patient-centered individualized plan of care.  Continue frequent safety checks and vitals per unit protocol.    Risks, benefits and possible side effects of Medications: Risks, benefits, and possible side effects of medications have previously been explained. No new medications at this time.      Legal status: 201    Disposition: to be determined, pending SOAR application for  "potential group home placement. OT Cognitive Evaluation completed: \"Pt would benefit from discharge to a supportive environment that can provide checks for safety and compliance with IADLs. \"   Although patient's mood has stabilized, they are currently awaiting group home placement.  Their ability to recognize safety hazards take medications and participate in IADLs are significantly impaired by their cognitive deficits compounded by their chronic mental health needs and would be at risk for significant decompensation without the structure and support of a group home setting.      No associated orders from this encounter found during lookback period of 72 hours.  Autism spectrum disorder  Continue supportive care    No associated orders from this encounter found during lookback period of 72 hours.    Hyperlipidemia  - Lipitor 40 mg       Plan     Recommended Treatment:    - Encourage early mobility and having a structured day  - Provide frequent re-orientation, and cognitive stimulation  - Ensure assistive devices are in proper working order (eye-glasses, hearing aids)  - Encourage adequate hydration, nutrition and monitor bowel movements  - Maintain sleep-wake cycle: Uninterrupted sleep time; low-level lighting at night  - Fall precaution  - f/u SLIM recs regarding the medical problems   - Continue medication titration and treatment plan; adjust medication to optimize treatment response and as clinically indicated. .   - Observation: routine            - VS: as per unit protocol  - Diet: Regular diet  - Encourage group attendance and milieu therapy  - Dispo: To be determined     Scheduled medications:  Current Facility-Administered Medications   Medication Dose Route Frequency Provider Last Rate    acetaminophen  650 mg Oral Q6H PRN Basilio Brown MD      acetaminophen  650 mg Oral Q4H PRN Basilio Brown MD      acetaminophen  975 mg Oral Q6H PRN Basilio Brown MD      aluminum-magnesium " hydroxide-simethicone  30 mL Oral Q4H PRN Basilio Brown MD      ARIPiprazole  5 mg Oral Daily Basilio Panda MD      Artificial Tears  1 drop Both Eyes Q3H PRN Basilio Brown MD      atorvastatin  40 mg Oral Daily With Dinner WALLY Baptiste      benztropine  1 mg Intramuscular Q4H PRN Max 6/day Basilio Brown MD      benztropine  1 mg Oral Q4H PRN Max 6/day Basilio Brown MD      Cholecalciferol  2,500 Units Oral Daily Dustin Mcallister MD      cyanocobalamin  1,000 mcg Oral Daily WALLY Baptiste      Diclofenac Sodium  2 g Topical 4x Daily PRN WALLY Baptiste      hydrOXYzine HCL  50 mg Oral Q6H PRN Max 4/day Basilio Brown MD      Or    diphenhydrAMINE  50 mg Intramuscular Q6H PRN Basilio Brown MD      diphenhydrAMINE-zinc acetate   Topical Daily PRN Raj Guerrero MD      famotidine  20 mg Oral BID WALLY Baptiste      hydrOXYzine HCL  100 mg Oral Q6H PRN Max 4/day Basilio Brown MD      Or    LORazepam  2 mg Intramuscular Q6H PRN Basilio Brown MD      hydrOXYzine HCL  25 mg Oral Q6H PRN Max 4/day Basilio Brown MD      levothyroxine  37.5 mcg Oral Early Morning WALLY Baptiste      melatonin  6 mg Oral HS PRN WALLY Gomez      methocarbamol  500 mg Oral Q6H PRN WALLY Baptiste      OLANZapine  5 mg Oral Q4H PRN Max 3/day Basilio Brown MD      Or    OLANZapine  2.5 mg Intramuscular Q4H PRN Max 3/day Basilio Brown MD      OLANZapine  5 mg Oral Q3H PRN Max 3/day Basilio Brown MD      Or    OLANZapine  5 mg Intramuscular Q3H PRN Max 3/day Basilio Brown MD      OLANZapine  2.5 mg Oral Q4H PRN Max 6/day Basilio Brown MD      polyethylene glycol  17 g Oral Daily PRN Basilio Brown MD      propranolol  10 mg Oral Q8H PRN Basilio Brown MD      senna-docusate sodium  2 tablet Oral After Dinner Dustin Mcallister MD      sertraline  150 mg Oral  "Daily WALLY Gomez      traZODone  50 mg Oral HS PRN Basilio Brown MD        PRN:    acetaminophen    acetaminophen    acetaminophen    aluminum-magnesium hydroxide-simethicone    Artificial Tears    benztropine    benztropine    Diclofenac Sodium    hydrOXYzine HCL **OR** diphenhydrAMINE    diphenhydrAMINE-zinc acetate    hydrOXYzine HCL **OR** LORazepam    hydrOXYzine HCL    melatonin    methocarbamol    OLANZapine **OR** OLANZapine    OLANZapine **OR** OLANZapine    OLANZapine    polyethylene glycol    propranolol    traZODone       Subjective     Patient was visited on unit for continuing care; chart reviewed and discussed with multidisciplinary treatment team.  On approach, the patient was calm and cooperative. Denied any changes in mood, appetite, and energy level. No problem initiating and maintaining sleep.  Denied A/VH currently.  Denied SI/HI, intent or plan upon direct inquiry at this time.  No acute complaints or concerns.  Patient still pending updates from group home/personal-care home referrals have been sent out.    Patient continues to be visible in the milieu and interacts with staff and peers. No reports of aggression or self-injurious behavior on unit. No PRN medications used in the past 24 hours.    Patient accepted all offered medications and no adverse effects of medications noted or reported.    Objective    Current Mental Status Evaluation:  Mental Status Exam  Appearance: casually dressed, consistent with stated age  Motor: no psychomotor retardation, no gait abnormalities  Behavior: cooperative, answers questions appropriately  Speech: soft, normal rhythm  Mood: \"ok\"  Affect: blunted  Thought Process: concrete  Thought Content: denies delusions  Risk Potential: denies suicidal ideation, plan, or intent. Denies homicidal ideation  Perceptions: denies auditory hallucinations, denies visual hallucinations,   Sensorium: Oriented to person, place, time, and " situation  Cognition: cognitive ability appears intact but was not quantitatively tested  Consciousness: alert and awake  Attention: intact, able to focus without difficulty  Insight: limited  Judgement: limited            Vital signs in last 24 hours:    Temp:  [97 °F (36.1 °C)-97.6 °F (36.4 °C)] 97 °F (36.1 °C)  HR:  [71-82] 71  BP: (105-137)/(78-79) 105/79  Resp:  [18] 18  SpO2:  [97 %-98 %] 97 %  O2 Device: None (Room air)    Psychiatric Review of Systems  Medication Adverse Effects: see HPI  Behaviors: see HPI  Sleep: unchanged  Appetite: unchanged    Laboratory results:    I have personally reviewed all pertinent laboratory/tests results  No results found for this or any previous visit (from the past 48 hours).       Progress Toward Goals & Illness Status:   progressing, attends groups, participates in milieu therapy, mood is stabilizing, placement pending    Patient is not at goal. They are not yet ready for discharge. The patient's condition currently requires active psychopharmacological medication management, interdisciplinary coordination with case management, and the utilization of adjunctive milieu and group therapy to augment psychopharmacological efficacy. The patient's risk of morbidity, and progression or decompensation of psychiatric disease, is higher without this current treatment.     Next of Kin  Extended Emergency Contact Information  Primary Emergency Contact: Lois Roberson  Address: [address] [place] [address] of [place]  Home Phone: [phone]  Relation: Mother    Counseling / Coordination of Care  Patient's progress discussed with staff in treatment team meeting.  Medications, treatment progress and treatment plan reviewed with patient.  Medication education provided to patient.  Educated on importance of medication and treatment compliance.  Supportive therapy provided to patient.  Cognitive techniques utilized during the  session.  Reassurance and supportive therapy provided.  Reoriented to reality and reassured.  Encouraged participation in milieu and group therapy on the unit.  Crisis/safety plan discussed with patient.       Dustin Mcallister MD  Attending Psychiatrist   [facility]      This note has been constructed using a voice recognition system. There may be translation, syntax, or grammatical errors. If you have any questions, please contact the dictating provider.

## 2025-03-13 NOTE — NURSING NOTE
Patient is calm and cooperative upon approach. Patient is isolative to self and room, reading a book. Patient denies SI/HI/AH/VH. Patient is compliant with meds and meals.

## 2025-03-13 NOTE — PLAN OF CARE
Problem: Depression  Goal: Treatment Goal: Demonstrate behavioral control of depressive symptoms, verbalize feelings of improved mood/affect, and adopt new coping skills prior to discharge  Outcome: Progressing  Goal: Verbalize thoughts and feelings  Description: Interventions:  - Assess and re-assess patient's level of risk   - Engage patient in 1:1 interactions, daily, for a minimum of 15 minutes   - Encourage patient to express feelings, fears, frustrations, hopes   Outcome: Progressing  Goal: Refrain from harming self  Description: Interventions:  - Monitor patient closely, per order   - Supervise medication ingestion, monitor effects and side effects   Outcome: Progressing  Goal: Refrain from isolation  Description: Interventions:  - Develop a trusting relationship   - Encourage socialization   Outcome: Progressing     Problem: Anxiety  Goal: Anxiety is at manageable level  Description: Interventions:  - Assess and monitor patient's anxiety level.   - Monitor for signs and symptoms (heart palpitations, chest pain, shortness of breath, headaches, nausea, feeling jumpy, restlessness, irritable, apprehensive).   - Collaborate with interdisciplinary team and initiate plan and interventions as ordered.  - [place] patient to unit/surroundings  - Explain treatment plan  - Encourage participation in care  - Encourage verbalization of concerns/fears  - Identify coping mechanisms  - Assist in developing anxiety-reducing skills  - Administer/offer alternative therapies  - Limit or eliminate stimulants  Outcome: Progressing     Problem: DISCHARGE PLANNING - CARE MANAGEMENT  Goal: Discharge to post-acute care or home with appropriate resources  Description: INTERVENTIONS:  - Conduct assessment to determine patient/family and health care team treatment goals, and need for post-acute services based on payer coverage, community resources, and patient preferences, and barriers to discharge  - Address psychosocial, clinical,  and financial barriers to discharge as identified in assessment in conjunction with the patient/family and health care team  - Arrange appropriate level of post-acute services according to patient’s   needs and preference and payer coverage in collaboration with the physician and health care team  - Communicate with and update the patient/family, physician, and health care team regarding progress on the discharge plan  - Arrange appropriate transportation to post-acute venues  Outcome: Progressing     Problem: Knowledge Deficit  Goal: Patient/family/caregiver demonstrates understanding of disease process, treatment plan, medications, and discharge instructions  Description: Complete learning assessment and assess knowledge base.  Interventions:  - Provide teaching at level of understanding  - Provide teaching via preferred learning methods  Outcome: Progressing     Problem: SLEEP DISTURBANCE  Goal: Will exhibit normal sleeping pattern  Description: Interventions:  -  Assess the patients sleep pattern, noting recent changes  - Administer medication as ordered  - Decrease environmental stimuli, including noise, as appropriate during the night  - Encourage the patient to actively participate in unit groups and or exercise during the day to enhance ability to achieve adequate sleep at night  - Assess the patient, in the morning, encouraging a description of sleep experience  Outcome: Progressing

## 2025-03-14 PROCEDURE — 99232 SBSQ HOSP IP/OBS MODERATE 35: CPT | Performed by: PSYCHIATRY & NEUROLOGY

## 2025-03-14 RX ADMIN — SENNOSIDES AND DOCUSATE SODIUM 2 TABLET: 50; 8.6 TABLET ORAL at 17:43

## 2025-03-14 RX ADMIN — FAMOTIDINE 20 MG: 20 TABLET ORAL at 08:18

## 2025-03-14 RX ADMIN — ARIPIPRAZOLE 5 MG: 5 TABLET ORAL at 08:18

## 2025-03-14 RX ADMIN — FAMOTIDINE 20 MG: 20 TABLET ORAL at 17:43

## 2025-03-14 RX ADMIN — Medication 2500 UNITS: at 08:18

## 2025-03-14 RX ADMIN — ATORVASTATIN CALCIUM 40 MG: 40 TABLET, FILM COATED ORAL at 17:43

## 2025-03-14 RX ADMIN — LEVOTHYROXINE SODIUM 37.5 MCG: 0.07 TABLET ORAL at 06:31

## 2025-03-14 RX ADMIN — SERTRALINE HYDROCHLORIDE 150 MG: 100 TABLET ORAL at 08:18

## 2025-03-14 RX ADMIN — CYANOCOBALAMIN TAB 1000 MCG 1000 MCG: 1000 TAB at 08:18

## 2025-03-14 NOTE — PROGRESS NOTES
Progress Note - Behavioral Health   Name: Franco Roberson 39 y.o. male I MRN: [id number]  Unit/Bed#: -01 I Date of Admission: 5/14/2024   Date of Service: 3/14/2025 I Hospital Day: 304     Assessment & Plan  MDD (major depressive disorder), recurrent severe, without psychosis (HCC)    Patient continues to do well and the plan is to continue with the current treatment plan as mentioned below with no changes.    Patient continues to be pending group home placement. Otherwise no clinical significant change.   continues to look for group home/personal-care home placement however this can be significantly impacted by patient no longer having money in the [employer] account and his bank account being over drawn.  Patient is quite altruistic in his desire not to have his friend go to penitentiary however continues to display poor to limited insight regarding his ability to advocate for himself and organize/prioritize his own needs given his own difficult social situation. Would be at significant risk of harm if not discharged to a structured setting with support.    Medication regimen as follows, no changes as of 3/14/2025:  Abilify 5 mg daily as mood adjunct   Zoloft 150 mg daily for depression and anxiety  Continue to encourage participation in group therapy, milieu therapy and occupational therapy.  Continue to assess for side effects of medications.  Continue collaboration with PATRICIA for medical co-morbidities as indicated.  Continue discussion with CM/SW to assist with obtaining collateral, disposition planning, and the implementation of patient-centered individualized plan of care.  Continue frequent safety checks and vitals per unit protocol.    Risks, benefits and possible side effects of Medications: Risks, benefits, and possible side effects of medications have previously been explained. No new medications at this time.      Legal status: 201    Disposition: to be determined, pending SOAR application for  "potential group home placement. OT Cognitive Evaluation completed: \"Pt would benefit from discharge to a supportive environment that can provide checks for safety and compliance with IADLs. \"   Although patient's mood has stabilized, they are currently awaiting group home placement.  Their ability to recognize safety hazards take medications and participate in IADLs are significantly impaired by their cognitive deficits compounded by their chronic mental health needs and would be at risk for significant decompensation without the structure and support of a group home setting.      No associated orders from this encounter found during lookback period of 72 hours.  Autism spectrum disorder  Continue supportive care    No associated orders from this encounter found during lookback period of 72 hours.    Hyperlipidemia  - Lipitor 40 mg       Plan     Recommended Treatment:    - Encourage early mobility and having a structured day  - Provide frequent re-orientation, and cognitive stimulation  - Ensure assistive devices are in proper working order (eye-glasses, hearing aids)  - Encourage adequate hydration, nutrition and monitor bowel movements  - Maintain sleep-wake cycle: Uninterrupted sleep time; low-level lighting at night  - Fall precaution  - f/u SLIM recs regarding the medical problems   - Continue medication titration and treatment plan; adjust medication to optimize treatment response and as clinically indicated. .   - Observation: routine            - VS: as per unit protocol  - Diet: Regular diet  - Encourage group attendance and milieu therapy  - Dispo: To be determined     Scheduled medications:  Current Facility-Administered Medications   Medication Dose Route Frequency Provider Last Rate    acetaminophen  650 mg Oral Q6H PRN Basilio Brown MD      acetaminophen  650 mg Oral Q4H PRN Basilio Brown MD      acetaminophen  975 mg Oral Q6H PRN Basilio Brown MD      aluminum-magnesium " hydroxide-simethicone  30 mL Oral Q4H PRN Basilio Brown MD      ARIPiprazole  5 mg Oral Daily Basilio Panda MD      Artificial Tears  1 drop Both Eyes Q3H PRN Basilio Brown MD      atorvastatin  40 mg Oral Daily With Dinner WALLY Baptiste      benztropine  1 mg Intramuscular Q4H PRN Max 6/day Basilio Brown MD      benztropine  1 mg Oral Q4H PRN Max 6/day Basilio Brown MD      Cholecalciferol  2,500 Units Oral Daily Dustin Mcallister MD      cyanocobalamin  1,000 mcg Oral Daily WALLY Baptiste      Diclofenac Sodium  2 g Topical 4x Daily PRN WALLY Baptiste      hydrOXYzine HCL  50 mg Oral Q6H PRN Max 4/day Basilio Brown MD      Or    diphenhydrAMINE  50 mg Intramuscular Q6H PRN Basilio Brown MD      diphenhydrAMINE-zinc acetate   Topical Daily PRN Raj Guerrero MD      famotidine  20 mg Oral BID WALLY Baptiste      hydrOXYzine HCL  100 mg Oral Q6H PRN Max 4/day Basilio Brown MD      Or    LORazepam  2 mg Intramuscular Q6H PRN Basilio Brown MD      hydrOXYzine HCL  25 mg Oral Q6H PRN Max 4/day Basilio Brown MD      levothyroxine  37.5 mcg Oral Early Morning WALLY Baptiste      melatonin  6 mg Oral HS PRN WALLY Gomez      methocarbamol  500 mg Oral Q6H PRN WALLY Baptiste      OLANZapine  5 mg Oral Q4H PRN Max 3/day Basilio Brown MD      Or    OLANZapine  2.5 mg Intramuscular Q4H PRN Max 3/day Basilio Brown MD      OLANZapine  5 mg Oral Q3H PRN Max 3/day Basilio Brown MD      Or    OLANZapine  5 mg Intramuscular Q3H PRN Max 3/day Basilio Brown MD      OLANZapine  2.5 mg Oral Q4H PRN Max 6/day Basilio rBown MD      polyethylene glycol  17 g Oral Daily PRN Basilio Brown MD      propranolol  10 mg Oral Q8H PRN Basilio Brown MD      senna-docusate sodium  2 tablet Oral After Dinner Dustin Mcallister MD      sertraline  150 mg Oral  "Daily WALLY Gomez      traZODone  50 mg Oral HS PRN Basilio Brown MD        PRN:    acetaminophen    acetaminophen    acetaminophen    aluminum-magnesium hydroxide-simethicone    Artificial Tears    benztropine    benztropine    Diclofenac Sodium    hydrOXYzine HCL **OR** diphenhydrAMINE    diphenhydrAMINE-zinc acetate    hydrOXYzine HCL **OR** LORazepam    hydrOXYzine HCL    melatonin    methocarbamol    OLANZapine **OR** OLANZapine    OLANZapine **OR** OLANZapine    OLANZapine    polyethylene glycol    propranolol    traZODone       Subjective     Patient was visited on unit for continuing care; chart reviewed and discussed with multidisciplinary treatment team.  On approach, the patient was calm and cooperative. Denied any changes in mood, appetite, and energy level. No problem initiating and maintaining sleep.  Denied A/VH currently.  Denied SI/HI, intent or plan upon direct inquiry at this time.    Patient continues to be visible in the milieu and interacts with staff and peers. No reports of aggression or self-injurious behavior on unit. No PRN medications used in the past 24 hours.    Patient accepted all offered medications and no adverse effects of medications noted or reported.    Objective    Current Mental Status Evaluation:  Mental Status Exam  Appearance: casually dressed, consistent with stated age  Motor: no psychomotor retardation, no gait abnormalities  Behavior: cooperative, answers questions appropriately  Speech: soft, normal rhythm  Mood: \"good\"  Affect: blunted  Thought Process: concrete  Thought Content: denies delusions  Risk Potential: denies suicidal ideation, plan, or intent. Denies homicidal ideation  Perceptions: denies auditory hallucinations, denies visual hallucinations,   Sensorium: Oriented to person, place, time, and situation  Cognition: cognitive ability appears intact but was not quantitatively tested  Consciousness: alert and awake  Attention: intact, able " to focus without difficulty  Insight: limited  Judgement: limited            Vital signs in last 24 hours:    Temp:  [97.5 °F (36.4 °C)-98.2 °F (36.8 °C)] 97.5 °F (36.4 °C)  HR:  [67-78] 67  BP: (114-127)/(73-79) 114/73  Resp:  [17-18] 18  SpO2:  [98 %-99 %] 99 %  O2 Device: None (Room air)    Psychiatric Review of Systems  Medication Adverse Effects: see HPI  Behaviors: see HPI  Sleep: unchanged  Appetite: unchanged    Laboratory results:    I have personally reviewed all pertinent laboratory/tests results  No results found for this or any previous visit (from the past 48 hours).       Progress Toward Goals & Illness Status:   progressing, attends groups, participates in milieu therapy, placement pending    Patient is not at goal. They are not yet ready for discharge. The patient's condition currently requires active psychopharmacological medication management, interdisciplinary coordination with case management, and the utilization of adjunctive milieu and group therapy to augment psychopharmacological efficacy. The patient's risk of morbidity, and progression or decompensation of psychiatric disease, is higher without this current treatment.     Next of Kin  Extended Emergency Contact Information  Primary Emergency Contact: Lois Roberson  Address: [address] [place] [address]  Home Phone: [phone]  Relation: Mother    Counseling / Coordination of Care  Patient's progress discussed with staff in treatment team meeting.  Medications, treatment progress and treatment plan reviewed with patient.  Medication education provided to patient.  Educated on importance of medication and treatment compliance.  Supportive therapy provided to patient.  Cognitive techniques utilized during the session.  Reassurance and supportive therapy provided.  Reoriented to reality and reassured.  Encouraged participation in milieu and group therapy on the unit.  Crisis/safety  plan discussed with patient.       Dustin Mcallister MD  Attending Psychiatrist   [facility]      This note has been constructed using a voice recognition system. There may be translation, syntax, or grammatical errors. If you have any questions, please contact the dictating provider.

## 2025-03-14 NOTE — PROGRESS NOTES
Progress Note - Behavioral Health   Name: Franco Roberson 39 y.o. male I MRN: [id number]  Unit/Bed#: -01 I Date of Admission: 5/14/2024   Date of Service: 3/14/2025 I Hospital Day: 304     Assessment & Plan  MDD (major depressive disorder), recurrent severe, without psychosis (HCC)    Patient continues to do well and the plan is to continue with the current treatment plan as mentioned below with no changes.    Patient continues to be pending group home placement. Otherwise no clinical significant change.   continues to look for group home/personal-care home placement however this can be significantly impacted by patient no longer having money in the [employer] account and his bank account being over drawn.  Patient is quite altruistic in his desire not to have his friend go to nursing home however continues to display poor to limited insight regarding his ability to advocate for himself and organize/prioritize his own needs given his own difficult social situation. Would be at significant risk of harm if not discharged to a structured setting with support.    Medication regimen as follows, no changes as of 3/13/2025:  Abilify 5 mg daily as mood adjunct   Zoloft 150 mg daily for depression and anxiety  Continue to encourage participation in group therapy, milieu therapy and occupational therapy.  Continue to assess for side effects of medications.  Continue collaboration with PATRICIA for medical co-morbidities as indicated.  Continue discussion with CM/SW to assist with obtaining collateral, disposition planning, and the implementation of patient-centered individualized plan of care.  Continue frequent safety checks and vitals per unit protocol.    Risks, benefits and possible side effects of Medications: Risks, benefits, and possible side effects of medications have previously been explained. No new medications at this time.      Legal status: 201    Disposition: to be determined, pending SOAR application for  "potential group home placement. OT Cognitive Evaluation completed: \"Pt would benefit from discharge to a supportive environment that can provide checks for safety and compliance with IADLs. \"   Although patient's mood has stabilized, they are currently awaiting group home placement.  Their ability to recognize safety hazards take medications and participate in IADLs are significantly impaired by their cognitive deficits compounded by their chronic mental health needs and would be at risk for significant decompensation without the structure and support of a group home setting.      No associated orders from this encounter found during lookback period of 72 hours.  Autism spectrum disorder  Continue supportive care    No associated orders from this encounter found during lookback period of 72 hours.    Hyperlipidemia  - Lipitor 40 mg     Current medications:  Current Facility-Administered Medications   Medication Dose Route Frequency Provider Last Rate    acetaminophen  650 mg Oral Q6H PRN Basilio Brown MD      acetaminophen  650 mg Oral Q4H PRN Basilio Brown MD      acetaminophen  975 mg Oral Q6H PRN Basilio Brown MD      aluminum-magnesium hydroxide-simethicone  30 mL Oral Q4H PRN Basilio Brown MD      ARIPiprazole  5 mg Oral Daily Basilio Panda MD      Artificial Tears  1 drop Both Eyes Q3H PRN Basilio Brown MD      atorvastatin  40 mg Oral Daily With Dinner WALLY Baptiste      benztropine  1 mg Intramuscular Q4H PRN Max 6/day Basilio Brown MD      benztropine  1 mg Oral Q4H PRN Max 6/day Basilio Brown MD      Cholecalciferol  2,500 Units Oral Daily Dustin Mcallister MD      cyanocobalamin  1,000 mcg Oral Daily WALLY Baptiste      Diclofenac Sodium  2 g Topical 4x Daily PRN WALLY Baptiste      hydrOXYzine HCL  50 mg Oral Q6H PRN Max 4/day Basilio Brown MD      Or    diphenhydrAMINE  50 mg Intramuscular Q6H PRN Basilio Garcia" MD Karyn      diphenhydrAMINE-zinc acetate   Topical Daily PRN Raj Guerrero MD      famotidine  20 mg Oral BID WALLY Baptiste      hydrOXYzine HCL  100 mg Oral Q6H PRN Max 4/day Basilio Brown MD      Or    LORazepam  2 mg Intramuscular Q6H PRN Basilio Brown MD      hydrOXYzine HCL  25 mg Oral Q6H PRN Max 4/day Basilio Brown MD      levothyroxine  37.5 mcg Oral Early Morning WALLY Baptiste      melatonin  6 mg Oral HS PRN WALLY Gomez      methocarbamol  500 mg Oral Q6H PRN WALLY Baptiste      OLANZapine  5 mg Oral Q4H PRN Max 3/day Basilio Brown MD      Or    OLANZapine  2.5 mg Intramuscular Q4H PRN Max 3/day Basilio Brown MD      OLANZapine  5 mg Oral Q3H PRN Max 3/day Basilio Brown MD      Or    OLANZapine  5 mg Intramuscular Q3H PRN Max 3/day Basilio Brown MD      OLANZapine  2.5 mg Oral Q4H PRN Max 6/day Basilio Brown MD      polyethylene glycol  17 g Oral Daily PRN Basilio Brown MD      propranolol  10 mg Oral Q8H PRN Basilio Brown MD      senna-docusate sodium  2 tablet Oral After Dinner Dustin Mcallister MD      sertraline  150 mg Oral Daily WALLY Gomez      traZODone  50 mg Oral HS PRN Basilio Brown MD          Risks/Benefits of Treatment:     Risks, benefits, and possible side effects of medications explained to patient and patient verbalizes understanding and agreement for treatment.    Progress Toward Goals: mood is stabilizing    Treatment Planning:     All current active medications have been reviewed.  Continue to monitor response to treatment and assess for potential side effects of medications.  Encourage group therapy, milieu therapy and occupational therapy  Collaboration with medical service for medical comorbidities as indicated.  Behavioral Health checks for safety monitoring.    Estimated Discharge Day: 3/31/2025       Subjective     Behavior over the last  "24 hours: unchanged.    Per RN report he is still awaiting placement, will not attend groups on weekends but does attend all during the week. He reports that they did apply for social security for him but that he did not know the results of those yet. Per RN report he may have been defrauded out of funds. No complaints with medications.     Sleep: normal  Appetite: normal  Medication side effects: No  ROS: review of systems as noted above in HPI/Subjective report, all other systems are negative    Objective :  Temp:  [97.7 °F (36.5 °C)-97.9 °F (36.6 °C)] 97.7 °F (36.5 °C)  HR:  [68-74] 68  BP: (114-123)/(56-69) 114/56  Resp:  [16] 16  SpO2:  [96 %] 96 %  O2 Device: None (Room air)    Temp:  [97.7 °F (36.5 °C)-97.9 °F (36.6 °C)] 97.7 °F (36.5 °C)  HR:  [68-74] 68  BP: (114-123)/(56-69) 114/56  Resp:  [16] 16  SpO2:  [96 %] 96 %  O2 Device: None (Room air)    Current Mental Status Evaluation:  Mental Status Exam  Appearance: casually dressed, consistent with stated age  Motor: no psychomotor retardation, no gait abnormalities  Behavior: cooperative, answers questions appropriately  Speech: soft, normal rhythm  Mood: \"good\"  Affect: blunted  Thought Process: concrete  Thought Content: denies delusions  Risk Potential: denies suicidal ideation, plan, or intent. Denies homicidal ideation  Perceptions: denies auditory hallucinations, denies visual hallucinations,   Sensorium: Oriented to person, place, time, and situation  Cognition: cognitive ability appears intact but was not quantitatively tested  Consciousness: alert and awake  Attention: intact, able to focus without difficulty  Insight: limited  Judgement: limited      Lab Results: I have reviewed the following results:  Results from the past 24 hours: No results found for this or any previous visit (from the past 24 hours).    Administrative Statements     Counseling / Coordination of Care:   I have spent a total time of 25 minutes in caring for this patient on the " day of the visit/encounter including:  Patient's progress discussed with staff in treatment team meeting.  Medication changes reviewed with staff in treatment team meeting..    Geraldine Leonard MD 03/15/25

## 2025-03-14 NOTE — PROGRESS NOTES
03/14/25 0828   Team Meeting   Meeting Type Daily Rounds   Team Members Present   Team Members Present Physician;Nurse;   Physician Team Member Ary   Nursing Team Member [facility] Management Team Member Noa   Patient/Family Present   Patient Present No   Patient's Family Present No     Pt is medication and meal compliant. Pt denies SI/HI/AVH. Pt is calm and compliant. Pt is social with select peers. Pt's discharge is pending placement.

## 2025-03-14 NOTE — SOCIAL WORK
Cm called  home. Cm requested to speak with admissions coordinator. They reported she is not in yet and will call  shortly. Cm provided jose back information.

## 2025-03-14 NOTE — NURSING NOTE
Patient visible on the unit watching tv for a short period of time. Patient appears withdrawn to self and depressed. Patient ate meal in room,remaining secluded most of the shift. Denies depression SI/HI/AH/VH at this time ,compliant with medications and routine care.

## 2025-03-14 NOTE — PLAN OF CARE
Problem: Ineffective Coping  Goal: Participates in unit activities  Description: Interventions:  - Provide therapeutic environment   - Provide required programming   - Redirect inappropriate behaviors   Outcome: Progressing     Problem: Depression  Goal: Treatment Goal: Demonstrate behavioral control of depressive symptoms, verbalize feelings of improved mood/affect, and adopt new coping skills prior to discharge  Outcome: Progressing  Goal: Verbalize thoughts and feelings  Description: Interventions:  - Assess and re-assess patient's level of risk   - Engage patient in 1:1 interactions, daily, for a minimum of 15 minutes   - Encourage patient to express feelings, fears, frustrations, hopes   Outcome: Progressing  Goal: Refrain from harming self  Description: Interventions:  - Monitor patient closely, per order   - Supervise medication ingestion, monitor effects and side effects   Outcome: Progressing  Goal: Refrain from isolation  Description: Interventions:  - Develop a trusting relationship   - Encourage socialization   Outcome: Progressing     Problem: Anxiety  Goal: Anxiety is at manageable level  Description: Interventions:  - Assess and monitor patient's anxiety level.   - Monitor for signs and symptoms (heart palpitations, chest pain, shortness of breath, headaches, nausea, feeling jumpy, restlessness, irritable, apprehensive).   - Collaborate with interdisciplinary team and initiate plan and interventions as ordered.  - [place] patient to unit/surroundings  - Explain treatment plan  - Encourage participation in care  - Encourage verbalization of concerns/fears  - Identify coping mechanisms  - Assist in developing anxiety-reducing skills  - Administer/offer alternative therapies  - Limit or eliminate stimulants  Outcome: Progressing     Problem: DISCHARGE PLANNING - CARE MANAGEMENT  Goal: Discharge to post-acute care or home with appropriate resources  Description: INTERVENTIONS:  - Conduct assessment to  determine patient/family and health care team treatment goals, and need for post-acute services based on payer coverage, community resources, and patient preferences, and barriers to discharge  - Address psychosocial, clinical, and financial barriers to discharge as identified in assessment in conjunction with the patient/family and health care team  - Arrange appropriate level of post-acute services according to patient’s   needs and preference and payer coverage in collaboration with the physician and health care team  - Communicate with and update the patient/family, physician, and health care team regarding progress on the discharge plan  - Arrange appropriate transportation to post-acute venues  Outcome: Progressing     Problem: Knowledge Deficit  Goal: Patient/family/caregiver demonstrates understanding of disease process, treatment plan, medications, and discharge instructions  Description: Complete learning assessment and assess knowledge base.  Interventions:  - Provide teaching at level of understanding  - Provide teaching via preferred learning methods  Outcome: Progressing     Problem: SLEEP DISTURBANCE  Goal: Will exhibit normal sleeping pattern  Description: Interventions:  -  Assess the patients sleep pattern, noting recent changes  - Administer medication as ordered  - Decrease environmental stimuli, including noise, as appropriate during the night  - Encourage the patient to actively participate in unit groups and or exercise during the day to enhance ability to achieve adequate sleep at night  - Assess the patient, in the morning, encouraging a description of sleep experience  Outcome: Progressing

## 2025-03-14 NOTE — NURSING NOTE
Remains seclusive to his room. Cooperative. Denies symptoms. Compliant with medications. Waiting placement.

## 2025-03-14 NOTE — ASSESSMENT & PLAN NOTE
"  Patient continues to do well and the plan is to continue with the current treatment plan as mentioned below with no changes.    Patient continues to be pending group home placement. Otherwise no clinical significant change.   continues to look for group home/personal-care home placement however this can be significantly impacted by patient no longer having money in the [employer] account and his bank account being over drawn.  Patient is quite altruistic in his desire not to have his friend go to long term however continues to display poor to limited insight regarding his ability to advocate for himself and organize/prioritize his own needs given his own difficult social situation. Would be at significant risk of harm if not discharged to a structured setting with support.    Medication regimen as follows, no changes as of 3/14/2025:  Abilify 5 mg daily as mood adjunct   Zoloft 150 mg daily for depression and anxiety  Continue to encourage participation in group therapy, milieu therapy and occupational therapy.  Continue to assess for side effects of medications.  Continue collaboration with SLIM for medical co-morbidities as indicated.  Continue discussion with CM/SW to assist with obtaining collateral, disposition planning, and the implementation of patient-centered individualized plan of care.  Continue frequent safety checks and vitals per unit protocol.    Risks, benefits and possible side effects of Medications: Risks, benefits, and possible side effects of medications have previously been explained. No new medications at this time.      Legal status: 201    Disposition: to be determined, pending SOAR application for potential group home placement. OT Cognitive Evaluation completed: \"Pt would benefit from discharge to a supportive environment that can provide checks for safety and compliance with IADLs. \"   Although patient's mood has stabilized, they are currently awaiting group home placement.  Their " ability to recognize safety hazards take medications and participate in IADLs are significantly impaired by their cognitive deficits compounded by their chronic mental health needs and would be at risk for significant decompensation without the structure and support of a group home setting.      No associated orders from this encounter found during lookback period of 72 hours.

## 2025-03-15 PROCEDURE — 99232 SBSQ HOSP IP/OBS MODERATE 35: CPT | Performed by: PSYCHIATRY & NEUROLOGY

## 2025-03-15 RX ADMIN — Medication 2500 UNITS: at 08:35

## 2025-03-15 RX ADMIN — CYANOCOBALAMIN TAB 1000 MCG 1000 MCG: 1000 TAB at 08:35

## 2025-03-15 RX ADMIN — ATORVASTATIN CALCIUM 40 MG: 40 TABLET, FILM COATED ORAL at 16:25

## 2025-03-15 RX ADMIN — SENNOSIDES AND DOCUSATE SODIUM 2 TABLET: 50; 8.6 TABLET ORAL at 17:21

## 2025-03-15 RX ADMIN — LEVOTHYROXINE SODIUM 37.5 MCG: 0.07 TABLET ORAL at 06:23

## 2025-03-15 RX ADMIN — FAMOTIDINE 20 MG: 20 TABLET ORAL at 08:35

## 2025-03-15 RX ADMIN — SERTRALINE HYDROCHLORIDE 150 MG: 100 TABLET ORAL at 08:35

## 2025-03-15 RX ADMIN — ARIPIPRAZOLE 5 MG: 5 TABLET ORAL at 08:35

## 2025-03-15 RX ADMIN — FAMOTIDINE 20 MG: 20 TABLET ORAL at 17:21

## 2025-03-15 NOTE — PROGRESS NOTES
Progress Note - Behavioral Health   Name: Franco Roberson 39 y.o. male I MRN: [id number]  Unit/Bed#: -01 I Date of Admission: 5/14/2024   Date of Service: 3/15/2025 I Hospital Day: 305     Assessment & Plan  MDD (major depressive disorder), recurrent severe, without psychosis (HCC)    Patient continues to do well and the plan is to continue with the current treatment plan as mentioned below with no changes.    Patient continues to be pending group home placement. Otherwise no clinical significant change.   continues to look for group home/personal-care home placement however this can be significantly impacted by patient no longer having money in the [employer] account and his bank account being over drawn.  Patient is quite altruistic in his desire not to have his friend go to halfway however continues to display poor to limited insight regarding his ability to advocate for himself and organize/prioritize his own needs given his own difficult social situation. Would be at significant risk of harm if not discharged to a structured setting with support.    Medication regimen as follows, no changes as of 3/14/2025:  Abilify 5 mg daily as mood adjunct   Zoloft 150 mg daily for depression and anxiety  Continue to encourage participation in group therapy, milieu therapy and occupational therapy.  Continue to assess for side effects of medications.  Continue collaboration with PATRICIA for medical co-morbidities as indicated.  Continue discussion with CM/SW to assist with obtaining collateral, disposition planning, and the implementation of patient-centered individualized plan of care.  Continue frequent safety checks and vitals per unit protocol.    Risks, benefits and possible side effects of Medications: Risks, benefits, and possible side effects of medications have previously been explained. No new medications at this time.      Legal status: 201    Disposition: to be determined, pending SOAR application for  "potential group home placement. OT Cognitive Evaluation completed: \"Pt would benefit from discharge to a supportive environment that can provide checks for safety and compliance with IADLs. \"   Although patient's mood has stabilized, they are currently awaiting group home placement.  Their ability to recognize safety hazards take medications and participate in IADLs are significantly impaired by their cognitive deficits compounded by their chronic mental health needs and would be at risk for significant decompensation without the structure and support of a group home setting.      No associated orders from this encounter found during lookback period of 72 hours.  Autism spectrum disorder  Continue supportive care    No associated orders from this encounter found during lookback period of 72 hours.    Hyperlipidemia  - Lipitor 40 mg     Current medications:  Current Facility-Administered Medications   Medication Dose Route Frequency Provider Last Rate    acetaminophen  650 mg Oral Q6H PRN Basilio Brown MD      acetaminophen  650 mg Oral Q4H PRN Basilio Brown MD      acetaminophen  975 mg Oral Q6H PRN Basilio Brown MD      aluminum-magnesium hydroxide-simethicone  30 mL Oral Q4H PRN Basilio Brown MD      ARIPiprazole  5 mg Oral Daily Basilio Panda MD      Artificial Tears  1 drop Both Eyes Q3H PRN Basilio Brown MD      atorvastatin  40 mg Oral Daily With Dinner WALLY Baptiste      benztropine  1 mg Intramuscular Q4H PRN Max 6/day Basilio Brown MD      benztropine  1 mg Oral Q4H PRN Max 6/day Basilio Brown MD      Cholecalciferol  2,500 Units Oral Daily Dustin Mcallister MD      cyanocobalamin  1,000 mcg Oral Daily WALLY Baptiste      Diclofenac Sodium  2 g Topical 4x Daily PRN WALLY Baptiste      hydrOXYzine HCL  50 mg Oral Q6H PRN Max 4/day Basilio Brown MD      Or    diphenhydrAMINE  50 mg Intramuscular Q6H PRN Basilio Garcia" MD Karyn      diphenhydrAMINE-zinc acetate   Topical Daily PRN Raj Guerrero MD      famotidine  20 mg Oral BID WALLY Baptiste      hydrOXYzine HCL  100 mg Oral Q6H PRN Max 4/day Basilio Brown MD      Or    LORazepam  2 mg Intramuscular Q6H PRN Basilio Brown MD      hydrOXYzine HCL  25 mg Oral Q6H PRN Max 4/day Basilio Brown MD      levothyroxine  37.5 mcg Oral Early Morning WALLY Baptiste      melatonin  6 mg Oral HS PRN WALLY Gomez      methocarbamol  500 mg Oral Q6H PRN WALLY Baptiste      OLANZapine  5 mg Oral Q4H PRN Max 3/day Basilio Brown MD      Or    OLANZapine  2.5 mg Intramuscular Q4H PRN Max 3/day Basilio Brown MD      OLANZapine  5 mg Oral Q3H PRN Max 3/day Basilio Brown MD      Or    OLANZapine  5 mg Intramuscular Q3H PRN Max 3/day Basilio Brown MD      OLANZapine  2.5 mg Oral Q4H PRN Max 6/day Basilio Brown MD      polyethylene glycol  17 g Oral Daily PRN Basilio Brown MD      propranolol  10 mg Oral Q8H PRN Basilio Brown MD      senna-docusate sodium  2 tablet Oral After Dinner Dustin Mcallister MD      sertraline  150 mg Oral Daily WALLY Gomez      traZODone  50 mg Oral HS PRN Basilio Brown MD          Risks/Benefits of Treatment:     Risks, benefits, and possible side effects of medications explained to patient and patient verbalizes understanding and agreement for treatment.    Progress Toward Goals: discharge planning    Treatment Planning:     All current active medications have been reviewed.  Continue to monitor response to treatment and assess for potential side effects of medications.  Encourage group therapy, milieu therapy and occupational therapy  Collaboration with medical service for medical comorbidities as indicated.  Behavioral Health checks for safety monitoring.    Estimated Discharge Day: 3/31/2025     Subjective   Behavior over the last 24  "hours: unchanged.    Patient seen in room, he is cooperative with vitals, with medications and is resting this weekend from groups. His roommate is now on 1:1.    Sleep: normal  Appetite: normal  Medication side effects: No  ROS: review of systems as noted above in HPI/Subjective report, all other systems are negative    Objective :  Temp:  [97.6 °F (36.4 °C)-97.7 °F (36.5 °C)] 97.6 °F (36.4 °C)  HR:  [68-79] 79  BP: (114)/(56-61) 114/61  Resp:  [16] 16  SpO2:  [96 %-97 %] 97 %  O2 Device: None (Room air)    Temp:  [97.6 °F (36.4 °C)-97.7 °F (36.5 °C)] 97.6 °F (36.4 °C)  HR:  [68-79] 79  BP: (114)/(56-61) 114/61  Resp:  [16] 16  SpO2:  [96 %-97 %] 97 %  O2 Device: None (Room air)    Mental Status Evaluation:  Mental Status Exam  Appearance: casually dressed, consistent with stated age  Motor: no psychomotor retardation, no gait abnormalities  Behavior: cooperative, answers questions appropriately  Speech: soft, normal rhythm  Mood: \"good\"  Affect: blunted  Thought Process: concrete  Thought Content: denies delusions  Risk Potential: denies suicidal ideation, plan, or intent. Denies homicidal ideation  Perceptions: denies auditory hallucinations, denies visual hallucinations,   Sensorium: Oriented to person, place, time, and situation  Cognition: cognitive ability appears intact but was not quantitatively tested  Consciousness: alert and awake  Attention: intact, able to focus without difficulty  Insight: limited  Judgement: limited      Lab Results: I have reviewed the following results:  Results from the past 24 hours: No results found for this or any previous visit (from the past 24 hours).    Administrative Statements     Counseling / Coordination of Care:   I have spent a total time of 25 minutes in caring for this patient on the day of the visit/encounter including:  Patient's progress discussed with staff in treatment team meeting.  Medication changes reviewed with staff in treatment team " meeting..    Geraldine Leonard MD 03/16/25

## 2025-03-15 NOTE — PLAN OF CARE
Problem: Ineffective Coping  Goal: Participates in unit activities  Description: Interventions:  - Provide therapeutic environment   - Provide required programming   - Redirect inappropriate behaviors   Outcome: Progressing     Problem: Depression  Goal: Treatment Goal: Demonstrate behavioral control of depressive symptoms, verbalize feelings of improved mood/affect, and adopt new coping skills prior to discharge  Outcome: Progressing  Goal: Verbalize thoughts and feelings  Description: Interventions:  - Assess and re-assess patient's level of risk   - Engage patient in 1:1 interactions, daily, for a minimum of 15 minutes   - Encourage patient to express feelings, fears, frustrations, hopes   Outcome: Progressing  Goal: Refrain from harming self  Description: Interventions:  - Monitor patient closely, per order   - Supervise medication ingestion, monitor effects and side effects   Outcome: Progressing  Goal: Refrain from isolation  Description: Interventions:  - Develop a trusting relationship   - Encourage socialization   Outcome: Progressing     Problem: Anxiety  Goal: Anxiety is at manageable level  Description: Interventions:  - Assess and monitor patient's anxiety level.   - Monitor for signs and symptoms (heart palpitations, chest pain, shortness of breath, headaches, nausea, feeling jumpy, restlessness, irritable, apprehensive).   - Collaborate with interdisciplinary team and initiate plan and interventions as ordered.  - [place] patient to unit/surroundings  - Explain treatment plan  - Encourage participation in care  - Encourage verbalization of concerns/fears  - Identify coping mechanisms  - Assist in developing anxiety-reducing skills  - Administer/offer alternative therapies  - Limit or eliminate stimulants  Outcome: Progressing     Problem: DISCHARGE PLANNING - CARE MANAGEMENT  Goal: Discharge to post-acute care or home with appropriate resources  Description: INTERVENTIONS:  - Conduct assessment to  determine patient/family and health care team treatment goals, and need for post-acute services based on payer coverage, community resources, and patient preferences, and barriers to discharge  - Address psychosocial, clinical, and financial barriers to discharge as identified in assessment in conjunction with the patient/family and health care team  - Arrange appropriate level of post-acute services according to patient’s   needs and preference and payer coverage in collaboration with the physician and health care team  - Communicate with and update the patient/family, physician, and health care team regarding progress on the discharge plan  - Arrange appropriate transportation to post-acute venues  Outcome: Progressing     Problem: Knowledge Deficit  Goal: Patient/family/caregiver demonstrates understanding of disease process, treatment plan, medications, and discharge instructions  Description: Complete learning assessment and assess knowledge base.  Interventions:  - Provide teaching at level of understanding  - Provide teaching via preferred learning methods  Outcome: Progressing     Problem: SLEEP DISTURBANCE  Goal: Will exhibit normal sleeping pattern  Description: Interventions:  -  Assess the patients sleep pattern, noting recent changes  - Administer medication as ordered  - Decrease environmental stimuli, including noise, as appropriate during the night  - Encourage the patient to actively participate in unit groups and or exercise during the day to enhance ability to achieve adequate sleep at night  - Assess the patient, in the morning, encouraging a description of sleep experience  Outcome: Progressing

## 2025-03-15 NOTE — NURSING NOTE
Pt has been withdrawn to his room except for meals and needs. Medication compliant. Denies SI/HI/AH/VH. Encouraged to attend groups but pt continues to report he takes the weekends off of groups. Denies any unmet needs or complaints.

## 2025-03-15 NOTE — ASSESSMENT & PLAN NOTE
"  Patient continues to do well and the plan is to continue with the current treatment plan as mentioned below with no changes.    Patient continues to be pending group home placement. Otherwise no clinical significant change.   continues to look for group home/personal-care home placement however this can be significantly impacted by patient no longer having money in the [employer] account and his bank account being over drawn.  Patient is quite altruistic in his desire not to have his friend go to senior care however continues to display poor to limited insight regarding his ability to advocate for himself and organize/prioritize his own needs given his own difficult social situation. Would be at significant risk of harm if not discharged to a structured setting with support.    Medication regimen as follows, no changes as of 3/14/2025:  Abilify 5 mg daily as mood adjunct   Zoloft 150 mg daily for depression and anxiety  Continue to encourage participation in group therapy, milieu therapy and occupational therapy.  Continue to assess for side effects of medications.  Continue collaboration with SLIM for medical co-morbidities as indicated.  Continue discussion with CM/SW to assist with obtaining collateral, disposition planning, and the implementation of patient-centered individualized plan of care.  Continue frequent safety checks and vitals per unit protocol.    Risks, benefits and possible side effects of Medications: Risks, benefits, and possible side effects of medications have previously been explained. No new medications at this time.      Legal status: 201    Disposition: to be determined, pending SOAR application for potential group home placement. OT Cognitive Evaluation completed: \"Pt would benefit from discharge to a supportive environment that can provide checks for safety and compliance with IADLs. \"   Although patient's mood has stabilized, they are currently awaiting group home placement.  Their " ability to recognize safety hazards take medications and participate in IADLs are significantly impaired by their cognitive deficits compounded by their chronic mental health needs and would be at risk for significant decompensation without the structure and support of a group home setting.      No associated orders from this encounter found during lookback period of 72 hours.

## 2025-03-15 NOTE — NURSING NOTE
Patient visible on the unit intermittently , appeared to be smiling and contempt. Patient denies SI/HI/AH/VH at this time. Compliant with medications and routine care.  Pending placement.

## 2025-03-16 PROCEDURE — 99232 SBSQ HOSP IP/OBS MODERATE 35: CPT | Performed by: PSYCHIATRY & NEUROLOGY

## 2025-03-16 RX ADMIN — ARIPIPRAZOLE 5 MG: 5 TABLET ORAL at 08:22

## 2025-03-16 RX ADMIN — SERTRALINE HYDROCHLORIDE 150 MG: 100 TABLET ORAL at 08:23

## 2025-03-16 RX ADMIN — SENNOSIDES AND DOCUSATE SODIUM 2 TABLET: 50; 8.6 TABLET ORAL at 17:12

## 2025-03-16 RX ADMIN — Medication 2500 UNITS: at 08:26

## 2025-03-16 RX ADMIN — FAMOTIDINE 20 MG: 20 TABLET ORAL at 17:13

## 2025-03-16 RX ADMIN — FAMOTIDINE 20 MG: 20 TABLET ORAL at 08:22

## 2025-03-16 RX ADMIN — CYANOCOBALAMIN TAB 1000 MCG 1000 MCG: 1000 TAB at 08:23

## 2025-03-16 RX ADMIN — LEVOTHYROXINE SODIUM 37.5 MCG: 0.07 TABLET ORAL at 06:06

## 2025-03-16 RX ADMIN — Medication 6 MG: at 21:12

## 2025-03-16 RX ADMIN — ATORVASTATIN CALCIUM 40 MG: 40 TABLET, FILM COATED ORAL at 17:12

## 2025-03-16 NOTE — NURSING NOTE
Patient has been quiet although visible in the milieu from time to time this evening. He is pleasant in interaction but keeps it brief and seems a little uncomfortable. He denies S.I.H.I.A/H V/H

## 2025-03-16 NOTE — NURSING NOTE
Pt is visible for meals and needs. Medication compliant. Declining to attend groups. Social with select peers and staff. Denies any unmet needs or complaints at this time.

## 2025-03-16 NOTE — PLAN OF CARE
Problem: Ineffective Coping  Goal: Participates in unit activities  Description: Interventions:  - Provide therapeutic environment   - Provide required programming   - Redirect inappropriate behaviors   Outcome: Progressing     Problem: Depression  Goal: Treatment Goal: Demonstrate behavioral control of depressive symptoms, verbalize feelings of improved mood/affect, and adopt new coping skills prior to discharge  Outcome: Progressing  Goal: Verbalize thoughts and feelings  Description: Interventions:  - Assess and re-assess patient's level of risk   - Engage patient in 1:1 interactions, daily, for a minimum of 15 minutes   - Encourage patient to express feelings, fears, frustrations, hopes   Outcome: Progressing  Goal: Refrain from harming self  Description: Interventions:  - Monitor patient closely, per order   - Supervise medication ingestion, monitor effects and side effects   Outcome: Progressing  Goal: Refrain from isolation  Description: Interventions:  - Develop a trusting relationship   - Encourage socialization   Outcome: Progressing     Problem: Anxiety  Goal: Anxiety is at manageable level  Description: Interventions:  - Assess and monitor patient's anxiety level.   - Monitor for signs and symptoms (heart palpitations, chest pain, shortness of breath, headaches, nausea, feeling jumpy, restlessness, irritable, apprehensive).   - Collaborate with interdisciplinary team and initiate plan and interventions as ordered.  - [place] patient to unit/surroundings  - Explain treatment plan  - Encourage participation in care  - Encourage verbalization of concerns/fears  - Identify coping mechanisms  - Assist in developing anxiety-reducing skills  - Administer/offer alternative therapies  - Limit or eliminate stimulants  Outcome: Progressing     Problem: DISCHARGE PLANNING - CARE MANAGEMENT  Goal: Discharge to post-acute care or home with appropriate resources  Description: INTERVENTIONS:  - Conduct assessment to  determine patient/family and health care team treatment goals, and need for post-acute services based on payer coverage, community resources, and patient preferences, and barriers to discharge  - Address psychosocial, clinical, and financial barriers to discharge as identified in assessment in conjunction with the patient/family and health care team  - Arrange appropriate level of post-acute services according to patient’s   needs and preference and payer coverage in collaboration with the physician and health care team  - Communicate with and update the patient/family, physician, and health care team regarding progress on the discharge plan  - Arrange appropriate transportation to post-acute venues  Outcome: Progressing     Problem: Knowledge Deficit  Goal: Patient/family/caregiver demonstrates understanding of disease process, treatment plan, medications, and discharge instructions  Description: Complete learning assessment and assess knowledge base.  Interventions:  - Provide teaching at level of understanding  - Provide teaching via preferred learning methods  Outcome: Progressing     Problem: SLEEP DISTURBANCE  Goal: Will exhibit normal sleeping pattern  Description: Interventions:  -  Assess the patients sleep pattern, noting recent changes  - Administer medication as ordered  - Decrease environmental stimuli, including noise, as appropriate during the night  - Encourage the patient to actively participate in unit groups and or exercise during the day to enhance ability to achieve adequate sleep at night  - Assess the patient, in the morning, encouraging a description of sleep experience  Outcome: Progressing

## 2025-03-17 PROCEDURE — 99232 SBSQ HOSP IP/OBS MODERATE 35: CPT | Performed by: PSYCHIATRY & NEUROLOGY

## 2025-03-17 RX ADMIN — ATORVASTATIN CALCIUM 40 MG: 40 TABLET, FILM COATED ORAL at 17:07

## 2025-03-17 RX ADMIN — FAMOTIDINE 20 MG: 20 TABLET ORAL at 08:21

## 2025-03-17 RX ADMIN — SERTRALINE HYDROCHLORIDE 150 MG: 100 TABLET ORAL at 08:21

## 2025-03-17 RX ADMIN — ARIPIPRAZOLE 5 MG: 5 TABLET ORAL at 08:21

## 2025-03-17 RX ADMIN — CYANOCOBALAMIN TAB 1000 MCG 1000 MCG: 1000 TAB at 08:21

## 2025-03-17 RX ADMIN — SENNOSIDES AND DOCUSATE SODIUM 2 TABLET: 50; 8.6 TABLET ORAL at 17:07

## 2025-03-17 RX ADMIN — FAMOTIDINE 20 MG: 20 TABLET ORAL at 17:07

## 2025-03-17 RX ADMIN — Medication 2500 UNITS: at 08:21

## 2025-03-17 RX ADMIN — LEVOTHYROXINE SODIUM 37.5 MCG: 0.07 TABLET ORAL at 06:34

## 2025-03-17 NOTE — PROGRESS NOTES
Progress Note - Behavioral Health   Name: Franco Roberson 39 y.o. male I MRN: [id number]  Unit/Bed#: -01 I Date of Admission: 5/14/2024   Date of Service: 3/17/2025 I Hospital Day: 307     Assessment & Plan  MDD (major depressive disorder), recurrent severe, without psychosis (HCC)    Patient continues to do well and the plan is to continue with the current treatment plan as mentioned below with no changes.    Patient continues to be pending group home placement. Otherwise no clinical significant change.   continues to look for group home/personal-care home placement however this can be significantly impacted by patient no longer having money in the [employer] account and his bank account being over drawn.  Patient is quite altruistic in his desire not to have his friend go to halfway however continues to display poor to limited insight regarding his ability to advocate for himself and organize/prioritize his own needs given his own difficult social situation. Would be at significant risk of harm if not discharged to a structured setting with support.    Medication regimen as follows, no changes as of 3/17/2025:  Abilify 5 mg daily as mood adjunct   Zoloft 150 mg daily for depression and anxiety  Continue to encourage participation in group therapy, milieu therapy and occupational therapy.  Continue to assess for side effects of medications.  Continue collaboration with PATRICIA for medical co-morbidities as indicated.  Continue discussion with CM/SW to assist with obtaining collateral, disposition planning, and the implementation of patient-centered individualized plan of care.  Continue frequent safety checks and vitals per unit protocol.    Risks, benefits and possible side effects of Medications: Risks, benefits, and possible side effects of medications have previously been explained. No new medications at this time.      Legal status: 201    Disposition: to be determined, pending SOAR application for  "potential group home placement. OT Cognitive Evaluation completed: \"Pt would benefit from discharge to a supportive environment that can provide checks for safety and compliance with IADLs. \"   Although patient's mood has stabilized, they are currently awaiting group home placement.  Their ability to recognize safety hazards take medications and participate in IADLs are significantly impaired by their cognitive deficits compounded by their chronic mental health needs and would be at risk for significant decompensation without the structure and support of a group home setting.      No associated orders from this encounter found during lookback period of 72 hours.  Autism spectrum disorder  Continue supportive care    No associated orders from this encounter found during lookback period of 72 hours.    Hyperlipidemia  - Lipitor 40 mg       Plan     Recommended Treatment:    - Encourage early mobility and having a structured day  - Provide frequent re-orientation, and cognitive stimulation  - Ensure assistive devices are in proper working order (eye-glasses, hearing aids)  - Encourage adequate hydration, nutrition and monitor bowel movements  - Maintain sleep-wake cycle: Uninterrupted sleep time; low-level lighting at night  - Fall precaution  - f/u SLIM recs regarding the medical problems   - Continue medication titration and treatment plan; adjust medication to optimize treatment response and as clinically indicated. .   - Observation: routine            - VS: as per unit protocol  - Diet: Regular diet  - Encourage group attendance and milieu therapy  - Dispo: To be determined     Scheduled medications:  Current Facility-Administered Medications   Medication Dose Route Frequency Provider Last Rate    acetaminophen  650 mg Oral Q6H PRN Basilio Brown MD      acetaminophen  650 mg Oral Q4H PRN Basilio Brown MD      acetaminophen  975 mg Oral Q6H PRN Basilio Brown MD      aluminum-magnesium " hydroxide-simethicone  30 mL Oral Q4H PRN Basilio Brown MD      ARIPiprazole  5 mg Oral Daily Basilio Panda MD      Artificial Tears  1 drop Both Eyes Q3H PRN Basilio Brown MD      atorvastatin  40 mg Oral Daily With Dinner WALLY Baptiste      benztropine  1 mg Intramuscular Q4H PRN Max 6/day Basilio Brown MD      benztropine  1 mg Oral Q4H PRN Max 6/day Basilio Brown MD      Cholecalciferol  2,500 Units Oral Daily Dustin Mcallister MD      cyanocobalamin  1,000 mcg Oral Daily WALLY Baptiste      Diclofenac Sodium  2 g Topical 4x Daily PRN WALLY Baptiste      hydrOXYzine HCL  50 mg Oral Q6H PRN Max 4/day Basilio Brown MD      Or    diphenhydrAMINE  50 mg Intramuscular Q6H PRN Basilio Brown MD      diphenhydrAMINE-zinc acetate   Topical Daily PRN Raj Guerrero MD      famotidine  20 mg Oral BID WALLY Baptiste      hydrOXYzine HCL  100 mg Oral Q6H PRN Max 4/day Basilio Brown MD      Or    LORazepam  2 mg Intramuscular Q6H PRN Basilio Brown MD      hydrOXYzine HCL  25 mg Oral Q6H PRN Max 4/day Basilio Brown MD      levothyroxine  37.5 mcg Oral Early Morning WALLY Baptiste      melatonin  6 mg Oral HS PRN WALLY Gomez      methocarbamol  500 mg Oral Q6H PRN WALLY Baptiste      OLANZapine  5 mg Oral Q4H PRN Max 3/day Basilio Brown MD      Or    OLANZapine  2.5 mg Intramuscular Q4H PRN Max 3/day Basilio Brown MD      OLANZapine  5 mg Oral Q3H PRN Max 3/day Basilio Brown MD      Or    OLANZapine  5 mg Intramuscular Q3H PRN Max 3/day Basilio Brown MD      OLANZapine  2.5 mg Oral Q4H PRN Max 6/day Basilio Brown MD      polyethylene glycol  17 g Oral Daily PRN Basilio Brown MD      propranolol  10 mg Oral Q8H PRN Basilio Brown MD      senna-docusate sodium  2 tablet Oral After Dinner Dustin Mcallister MD      sertraline  150 mg Oral  "Daily WALLY Gomez      traZODone  50 mg Oral HS PRN Basilio Brown MD        PRN:    acetaminophen    acetaminophen    acetaminophen    aluminum-magnesium hydroxide-simethicone    Artificial Tears    benztropine    benztropine    Diclofenac Sodium    hydrOXYzine HCL **OR** diphenhydrAMINE    diphenhydrAMINE-zinc acetate    hydrOXYzine HCL **OR** LORazepam    hydrOXYzine HCL    melatonin    methocarbamol    OLANZapine **OR** OLANZapine    OLANZapine **OR** OLANZapine    OLANZapine    polyethylene glycol    propranolol    traZODone       Subjective     Patient was visited on unit for continuing care; chart reviewed and discussed with multidisciplinary treatment team.  On approach, the patient was calm and cooperative. Denied any changes in mood, appetite, and energy level. No problem initiating and maintaining sleep.  Denied A/VH currently.  Denied SI/HI, intent or plan upon direct inquiry at this time. No acute complaints or concerns. Weekend went ok.    Patient continues to be visible in the milieu and interacts with staff and peers. No reports of aggression or self-injurious behavior on unit. No PRN medications used in the past 24 hours.    Patient accepted all offered medications and no adverse effects of medications noted or reported.    Objective    Current Mental Status Evaluation:  Mental Status Exam  Appearance: casually dressed, consistent with stated age  Motor: no psychomotor retardation, no gait abnormalities  Behavior: cooperative, answers questions appropriately  Speech: soft, normal rhythm  Mood: \"ok\"  Affect: blunted  Thought Process: linear and goal-oriented  Thought Content: denies delusions  Risk Potential: denies suicidal ideation, plan, or intent. Denies homicidal ideation  Perceptions: denies auditory hallucinations, denies visual hallucinations,   Sensorium: Oriented to person, place, time, and situation  Cognition: cognitive ability appears intact but was not quantitatively " tested  Consciousness: alert and awake  Attention: intact, able to focus without difficulty  Insight: limited  Judgement: limited            Vital signs in last 24 hours:    Temp:  [97.6 °F (36.4 °C)-98.1 °F (36.7 °C)] 98.1 °F (36.7 °C)  HR:  [82-87] 82  BP: (120-125)/(65-82) 125/82  Resp:  [16] 16  SpO2:  [96 %-98 %] 98 %  O2 Device: None (Room air)    Psychiatric Review of Systems  Medication Adverse Effects: see HPI  Behaviors: see HPI  Sleep: unchanged  Appetite: unchanged    Laboratory results:    I have personally reviewed all pertinent laboratory/tests results  No results found for this or any previous visit (from the past 48 hours).       Progress Toward Goals & Illness Status:   progressing, attends groups, participates in milieu therapy, mood is stabilizing, placement pending    Patient is not at goal. They are not yet ready for discharge. The patient's condition currently requires active psychopharmacological medication management, interdisciplinary coordination with case management, and the utilization of adjunctive milieu and group therapy to augment psychopharmacological efficacy. The patient's risk of morbidity, and progression or decompensation of psychiatric disease, is higher without this current treatment.     Next of Kin  Extended Emergency Contact Information  Primary Emergency Contact: Lois Roberson  Address: [address] [place] [address] of [facility]  Home Phone: [phone]  Relation: Mother    Counseling / Coordination of Care  Patient's progress discussed with staff in treatment team meeting.  Medications, treatment progress and treatment plan reviewed with patient.  Medication education provided to patient.  Educated on importance of medication and treatment compliance.  Supportive therapy provided to patient.  Cognitive techniques utilized during the session.  Reassurance and supportive therapy provided.  Reoriented to reality and  reassured.  Encouraged participation in milieu and group therapy on the unit.  Crisis/safety plan discussed with patient.       Dustin Mcallister MD  Attending Psychiatrist   [facility]      This note has been constructed using a voice recognition system. There may be translation, syntax, or grammatical errors. If you have any questions, please contact the dictating provider.

## 2025-03-17 NOTE — PLAN OF CARE
Pt sporadically attends groups, typically does not attend groups on the weekend. Pt quiet, but participates appropriately when prompted.

## 2025-03-17 NOTE — PLAN OF CARE
Problem: Depression  Goal: Treatment Goal: Demonstrate behavioral control of depressive symptoms, verbalize feelings of improved mood/affect, and adopt new coping skills prior to discharge  Outcome: Progressing  Goal: Verbalize thoughts and feelings  Description: Interventions:  - Assess and re-assess patient's level of risk   - Engage patient in 1:1 interactions, daily, for a minimum of 15 minutes   - Encourage patient to express feelings, fears, frustrations, hopes   Outcome: Progressing  Goal: Refrain from harming self  Description: Interventions:  - Monitor patient closely, per order   - Supervise medication ingestion, monitor effects and side effects   Outcome: Progressing  Goal: Refrain from isolation  Description: Interventions:  - Develop a trusting relationship   - Encourage socialization   Outcome: Progressing     Problem: Anxiety  Goal: Anxiety is at manageable level  Description: Interventions:  - Assess and monitor patient's anxiety level.   - Monitor for signs and symptoms (heart palpitations, chest pain, shortness of breath, headaches, nausea, feeling jumpy, restlessness, irritable, apprehensive).   - Collaborate with interdisciplinary team and initiate plan and interventions as ordered.  - [place] patient to unit/surroundings  - Explain treatment plan  - Encourage participation in care  - Encourage verbalization of concerns/fears  - Identify coping mechanisms  - Assist in developing anxiety-reducing skills  - Administer/offer alternative therapies  - Limit or eliminate stimulants  Outcome: Progressing     Problem: DISCHARGE PLANNING - CARE MANAGEMENT  Goal: Discharge to post-acute care or home with appropriate resources  Description: INTERVENTIONS:  - Conduct assessment to determine patient/family and health care team treatment goals, and need for post-acute services based on payer coverage, community resources, and patient preferences, and barriers to discharge  - Address psychosocial, clinical,  and financial barriers to discharge as identified in assessment in conjunction with the patient/family and health care team  - Arrange appropriate level of post-acute services according to patient’s   needs and preference and payer coverage in collaboration with the physician and health care team  - Communicate with and update the patient/family, physician, and health care team regarding progress on the discharge plan  - Arrange appropriate transportation to post-acute venues  Outcome: Progressing     Problem: Knowledge Deficit  Goal: Patient/family/caregiver demonstrates understanding of disease process, treatment plan, medications, and discharge instructions  Description: Complete learning assessment and assess knowledge base.  Interventions:  - Provide teaching at level of understanding  - Provide teaching via preferred learning methods  Outcome: Progressing     Problem: SLEEP DISTURBANCE  Goal: Will exhibit normal sleeping pattern  Description: Interventions:  -  Assess the patients sleep pattern, noting recent changes  - Administer medication as ordered  - Decrease environmental stimuli, including noise, as appropriate during the night  - Encourage the patient to actively participate in unit groups and or exercise during the day to enhance ability to achieve adequate sleep at night  - Assess the patient, in the morning, encouraging a description of sleep experience  Outcome: Progressing     Problem: Ineffective Coping  Goal: Participates in unit activities  Description: Interventions:  - Provide therapeutic environment   - Provide required programming   - Redirect inappropriate behaviors   Outcome: Not Progressing

## 2025-03-17 NOTE — PROGRESS NOTES
03/17/25 0842   Team Meeting   Meeting Type Daily Rounds   Team Members Present   Team Members Present Physician;Nurse;   Physician Team Member Ary   [facility] Suhas   Patient/Family Present   Patient Present No   Patient's Family Present No     Pt me/dmeal compliant. Visible on the unit. Pleasant, calm, cooperative. Discharge pending placement.

## 2025-03-17 NOTE — NURSING NOTE
Pt visible on unit in milieu for needs but isolative to room and self most of shift. He appears sad but denies feeling depressed, SI/HI/AH/VH. Pt did not attend groups today. Medication and meal compliant. No PRN's requested. No unmet needs.

## 2025-03-17 NOTE — NURSING NOTE
Patient is calm and cooperative upon approach. Patient is isolative to room and self, but visible at times on unit. Patient denies SI/HI/AH/VH. Patient is compliant with meds and meals.

## 2025-03-17 NOTE — ASSESSMENT & PLAN NOTE
"  Patient continues to do well and the plan is to continue with the current treatment plan as mentioned below with no changes.    Patient continues to be pending group home placement. Otherwise no clinical significant change.   continues to look for group home/personal-care home placement however this can be significantly impacted by patient no longer having money in the [employer] account and his bank account being over drawn.  Patient is quite altruistic in his desire not to have his friend go to [facility] however continues to display poor to limited insight regarding his ability to advocate for himself and organize/prioritize his own needs given his own difficult social situation. Would be at significant risk of harm if not discharged to a structured setting with support.    Medication regimen as follows, no changes as of 3/17/2025:  Abilify 5 mg daily as mood adjunct   Zoloft 150 mg daily for depression and anxiety  Continue to encourage participation in group therapy, milieu therapy and occupational therapy.  Continue to assess for side effects of medications.  Continue collaboration with SLIM for medical co-morbidities as indicated.  Continue discussion with CM/SW to assist with obtaining collateral, disposition planning, and the implementation of patient-centered individualized plan of care.  Continue frequent safety checks and vitals per unit protocol.    Risks, benefits and possible side effects of Medications: Risks, benefits, and possible side effects of medications have previously been explained. No new medications at this time.      Legal status: 201    Disposition: to be determined, pending SOAR application for potential group home placement. OT Cognitive Evaluation completed: \"Pt would benefit from discharge to a supportive environment that can provide checks for safety and compliance with IADLs. \"   Although patient's mood has stabilized, they are currently awaiting group home placement.  Their " ability to recognize safety hazards take medications and participate in IADLs are significantly impaired by their cognitive deficits compounded by their chronic mental health needs and would be at risk for significant decompensation without the structure and support of a group home setting.      No associated orders from this encounter found during lookback period of 72 hours.

## 2025-03-17 NOTE — NURSING NOTE
Patient has been seclusive to his room. Pleasant, calm and cooperative. Denies symptoms. No complaints and was compliant with morning medications.

## 2025-03-18 PROCEDURE — 99232 SBSQ HOSP IP/OBS MODERATE 35: CPT | Performed by: PSYCHIATRY & NEUROLOGY

## 2025-03-18 RX ADMIN — ATORVASTATIN CALCIUM 40 MG: 40 TABLET, FILM COATED ORAL at 17:05

## 2025-03-18 RX ADMIN — FAMOTIDINE 20 MG: 20 TABLET ORAL at 08:26

## 2025-03-18 RX ADMIN — FAMOTIDINE 20 MG: 20 TABLET ORAL at 17:05

## 2025-03-18 RX ADMIN — ARIPIPRAZOLE 5 MG: 5 TABLET ORAL at 08:26

## 2025-03-18 RX ADMIN — CYANOCOBALAMIN TAB 1000 MCG 1000 MCG: 1000 TAB at 08:26

## 2025-03-18 RX ADMIN — Medication 2500 UNITS: at 08:26

## 2025-03-18 RX ADMIN — SENNOSIDES AND DOCUSATE SODIUM 2 TABLET: 50; 8.6 TABLET ORAL at 17:05

## 2025-03-18 RX ADMIN — LEVOTHYROXINE SODIUM 37.5 MCG: 0.07 TABLET ORAL at 06:37

## 2025-03-18 RX ADMIN — SERTRALINE HYDROCHLORIDE 150 MG: 100 TABLET ORAL at 08:26

## 2025-03-18 NOTE — SOCIAL WORK
Cm called [facility] in [place]. Cm was directed to call   Radora [phone] from admissions department. Cm left voicemail with call back information.    Cm called [facility] and left call back information.   [facility] [phone].  They reported manager is not there, and provided a phone number [phone] Adele.

## 2025-03-18 NOTE — PLAN OF CARE
Problem: Ineffective Coping  Goal: Participates in unit activities  Description: Interventions:  - Provide therapeutic environment   - Provide required programming   - Redirect inappropriate behaviors   Outcome: Progressing     Problem: Depression  Goal: Treatment Goal: Demonstrate behavioral control of depressive symptoms, verbalize feelings of improved mood/affect, and adopt new coping skills prior to discharge  Outcome: Progressing  Goal: Verbalize thoughts and feelings  Description: Interventions:  - Assess and re-assess patient's level of risk   - Engage patient in 1:1 interactions, daily, for a minimum of 15 minutes   - Encourage patient to express feelings, fears, frustrations, hopes   Outcome: Progressing  Goal: Refrain from harming self  Description: Interventions:  - Monitor patient closely, per order   - Supervise medication ingestion, monitor effects and side effects   Outcome: Progressing  Goal: Refrain from isolation  Description: Interventions:  - Develop a trusting relationship   - Encourage socialization   Outcome: Progressing     Problem: Anxiety  Goal: Anxiety is at manageable level  Description: Interventions:  - Assess and monitor patient's anxiety level.   - Monitor for signs and symptoms (heart palpitations, chest pain, shortness of breath, headaches, nausea, feeling jumpy, restlessness, irritable, apprehensive).   - Collaborate with interdisciplinary team and initiate plan and interventions as ordered.  - [place] patient to unit/surroundings  - Explain treatment plan  - Encourage participation in care  - Encourage verbalization of concerns/fears  - Identify coping mechanisms  - Assist in developing anxiety-reducing skills  - Administer/offer alternative therapies  - Limit or eliminate stimulants  Outcome: Progressing     Problem: Knowledge Deficit  Goal: Patient/family/caregiver demonstrates understanding of disease process, treatment plan, medications, and discharge  instructions  Description: Complete learning assessment and assess knowledge base.  Interventions:  - Provide teaching at level of understanding  - Provide teaching via preferred learning methods  Outcome: Progressing     Problem: SLEEP DISTURBANCE  Goal: Will exhibit normal sleeping pattern  Description: Interventions:  -  Assess the patients sleep pattern, noting recent changes  - Administer medication as ordered  - Decrease environmental stimuli, including noise, as appropriate during the night  - Encourage the patient to actively participate in unit groups and or exercise during the day to enhance ability to achieve adequate sleep at night  - Assess the patient, in the morning, encouraging a description of sleep experience  Outcome: Progressing

## 2025-03-18 NOTE — PROGRESS NOTES
Progress Note - Behavioral Health   Name: Franco Roberson 39 y.o. male I MRN: [id number]  Unit/Bed#: -01 I Date of Admission: 5/14/2024   Date of Service: 3/18/2025 I Hospital Day: 308     Assessment & Plan  MDD (major depressive disorder), recurrent severe, without psychosis (HCC)    Patient continues to do well and the plan is to continue with the current treatment plan as mentioned below with no changes.    Patient continues to be pending group home placement. Otherwise no clinical significant change.   continues to look for group home/personal-care home placement however this can be significantly impacted by patient no longer having money in the [employer] account and his bank account being over drawn.  Patient is quite altruistic in his desire not to have his friend go to group home however continues to display poor to limited insight regarding his ability to advocate for himself and organize/prioritize his own needs given his own difficult social situation. Would be at significant risk of harm if not discharged to a structured setting with support.    Medication regimen as follows, no changes as of 3/18/2025:  Abilify 5 mg daily as mood adjunct   Zoloft 150 mg daily for depression and anxiety  Continue to encourage participation in group therapy, milieu therapy and occupational therapy.  Continue to assess for side effects of medications.  Continue collaboration with PATRICIA for medical co-morbidities as indicated.  Continue discussion with CM/SW to assist with obtaining collateral, disposition planning, and the implementation of patient-centered individualized plan of care.  Continue frequent safety checks and vitals per unit protocol.    Risks, benefits and possible side effects of Medications: Risks, benefits, and possible side effects of medications have previously been explained. No new medications at this time.      Legal status: 201    Disposition: to be determined, pending SOAR application for  "potential group home placement. OT Cognitive Evaluation completed: \"Pt would benefit from discharge to a supportive environment that can provide checks for safety and compliance with IADLs. \"   Although patient's mood has stabilized, they are currently awaiting group home placement.  Their ability to recognize safety hazards take medications and participate in IADLs are significantly impaired by their cognitive deficits compounded by their chronic mental health needs and would be at risk for significant decompensation without the structure and support of a group home setting.      No associated orders from this encounter found during lookback period of 72 hours.  Autism spectrum disorder  Continue supportive care    No associated orders from this encounter found during lookback period of 72 hours.    Hyperlipidemia  - Lipitor 40 mg       Plan     Recommended Treatment:    - Encourage early mobility and having a structured day  - Provide frequent re-orientation, and cognitive stimulation  - Ensure assistive devices are in proper working order (eye-glasses, hearing aids)  - Encourage adequate hydration, nutrition and monitor bowel movements  - Maintain sleep-wake cycle: Uninterrupted sleep time; low-level lighting at night  - Fall precaution  - f/u [facility]IM recs regarding the medical problems   - Continue medication titration and treatment plan; adjust medication to optimize treatment response and as clinically indicated. .   - Observation: routine            - VS: as per unit protocol  - Diet: Regular diet  - Encourage group attendance and milieu therapy  - Dispo: To be determined     Scheduled medications:  Current Facility-Administered Medications   Medication Dose Route Frequency Provider Last Rate    acetaminophen  650 mg Oral Q6H PRN Basilio Brown MD      acetaminophen  650 mg Oral Q4H PRN Basilio Brown MD      acetaminophen  975 mg Oral Q6H PRN Basilio Brown MD      aluminum-magnesium " hydroxide-simethicone  30 mL Oral Q4H PRN Basilio Brown MD      ARIPiprazole  5 mg Oral Daily Basilio Panda MD      Artificial Tears  1 drop Both Eyes Q3H PRN Basilio Brown MD      atorvastatin  40 mg Oral Daily With Dinner WALLY Baptiste      benztropine  1 mg Intramuscular Q4H PRN Max 6/day Basilio Brown MD      benztropine  1 mg Oral Q4H PRN Max 6/day Basilio Brown MD      Cholecalciferol  2,500 Units Oral Daily Dustin Mcallister MD      cyanocobalamin  1,000 mcg Oral Daily WALLY Baptiste      Diclofenac Sodium  2 g Topical 4x Daily PRN WALLY Baptiste      hydrOXYzine HCL  50 mg Oral Q6H PRN Max 4/day Basilio Brown MD      Or    diphenhydrAMINE  50 mg Intramuscular Q6H PRN Basilio Brown MD      diphenhydrAMINE-zinc acetate   Topical Daily PRN Raj Guerrero MD      famotidine  20 mg Oral BID WALLY Baptiste      hydrOXYzine HCL  100 mg Oral Q6H PRN Max 4/day Basilio Brown MD      Or    LORazepam  2 mg Intramuscular Q6H PRN Basilio Brown MD      hydrOXYzine HCL  25 mg Oral Q6H PRN Max 4/day Basilio Brown MD      levothyroxine  37.5 mcg Oral Early Morning WALLY Baptiste      melatonin  6 mg Oral HS PRN WALLY Gomez      methocarbamol  500 mg Oral Q6H PRN WALLY Baptiste      OLANZapine  5 mg Oral Q4H PRN Max 3/day Basilio Brown MD      Or    OLANZapine  2.5 mg Intramuscular Q4H PRN Max 3/day Basilio Brown MD      OLANZapine  5 mg Oral Q3H PRN Max 3/day Basilio Brown MD      Or    OLANZapine  5 mg Intramuscular Q3H PRN Max 3/day Basilio Brown MD      OLANZapine  2.5 mg Oral Q4H PRN Max 6/day Basilio Brown MD      polyethylene glycol  17 g Oral Daily PRN Basilio Brown MD      propranolol  10 mg Oral Q8H PRN Basilio Brown MD      senna-docusate sodium  2 tablet Oral After Dinner Dustin Mcallister MD      sertraline  150 mg Oral  "Daily WALLY Gomez      traZODone  50 mg Oral HS PRN Basilio Brown MD        PRN:    acetaminophen    acetaminophen    acetaminophen    aluminum-magnesium hydroxide-simethicone    Artificial Tears    benztropine    benztropine    Diclofenac Sodium    hydrOXYzine HCL **OR** diphenhydrAMINE    diphenhydrAMINE-zinc acetate    hydrOXYzine HCL **OR** LORazepam    hydrOXYzine HCL    melatonin    methocarbamol    OLANZapine **OR** OLANZapine    OLANZapine **OR** OLANZapine    OLANZapine    polyethylene glycol    propranolol    traZODone       Subjective     Patient was visited on unit for continuing care; chart reviewed and discussed with multidisciplinary treatment team.  On approach, the patient was calm and cooperative. Denied any changes in mood, appetite, and energy level. No problem initiating and maintaining sleep.  Denied A/VH currently.  Denied SI/HI, intent or plan upon direct inquiry at this time. No acute complaints or concerns at this time. Pt uninterested in hair/beard trim but otherwise demonstrating good hygiene.    Patient continues to be visible in the milieu and interacts with staff and peers. No reports of aggression or self-injurious behavior on unit. No PRN medications used in the past 24 hours.    Patient accepted all offered medications and no adverse effects of medications noted or reported.    Objective    Current Mental Status Evaluation:  Mental Status Exam  Appearance: casually dressed, consistent with stated age  Motor: no psychomotor retardation, no gait abnormalities  Behavior: cooperative, answers questions appropriately  Speech: soft, normal rhythm  Mood: \"good\"  Affect: blunted  Thought Process: concrete  Thought Content: denies delusions  Risk Potential: denies suicidal ideation, plan, or intent. Denies homicidal ideation  Perceptions: denies auditory hallucinations, denies visual hallucinations,   Sensorium: Oriented to person, place, time, and situation  Cognition: " cognitive ability appears intact but was not quantitatively tested  Consciousness: alert and awake  Attention: intact, able to focus without difficulty  Insight: limited  Judgement: limited            Vital signs in last 24 hours:    Temp:  [97.5 °F (36.4 °C)-98.1 °F (36.7 °C)] 98.1 °F (36.7 °C)  HR:  [70-80] 70  BP: (118-119)/(63-67) 118/67  Resp:  [16] 16  SpO2:  [94 %-95 %] 94 %  O2 Device: None (Room air)    Psychiatric Review of Systems  Medication Adverse Effects: see HPI  Behaviors: see HPI  Sleep: unchanged  Appetite: unchanged    Laboratory results:    I have personally reviewed all pertinent laboratory/tests results  No results found for this or any previous visit (from the past 48 hours).       Progress Toward Goals & Illness Status:   progressing, attends groups, participates in milieu therapy, mood is stabilizing, placement pending    Patient is not at goal. They are not yet ready for discharge. The patient's condition currently requires active psychopharmacological medication management, interdisciplinary coordination with case management, and the utilization of adjunctive milieu and group therapy to augment psychopharmacological efficacy. The patient's risk of morbidity, and progression or decompensation of psychiatric disease, is higher without this current treatment.     Next of Kin  Extended Emergency Contact Information  Primary Emergency Contact: Lois Roberson  Address: [address] [place] [address] of [facility]  Home Phone: [phone]  Relation: Mother    Counseling / Coordination of Care  Patient's progress discussed with staff in treatment team meeting.  Medications, treatment progress and treatment plan reviewed with patient.  Medication education provided to patient.  Educated on importance of medication and treatment compliance.  Supportive therapy provided to patient.  Cognitive techniques utilized during the session.  Reassurance and supportive  therapy provided.  Reoriented to reality and reassured.  Encouraged participation in milieu and group therapy on the unit.  Crisis/safety plan discussed with patient.       Dustin Mcallister MD  Attending Psychiatrist   [facility]      This note has been constructed using a voice recognition system. There may be translation, syntax, or grammatical errors. If you have any questions, please contact the dictating provider.

## 2025-03-18 NOTE — NURSING NOTE
Currently doing laps in the hallway. Quiet and to himself. Pleasant during interaction with writer. Compliant with medications. Denies symptoms. Waiting placement.

## 2025-03-18 NOTE — PROGRESS NOTES
03/18/25 0944   Team Meeting   Meeting Type Daily Rounds   Team Members Present   Team Members Present Physician;Nurse;   Physician Team Member Ary   Nursing Team Member [facility] Team Member Suhas   Patient/Family Present   Patient Present No   Patient's Family Present No     Pt med/meal compliant. Visible on the unit. Pleasant, calm, cooperative. Discharge pending placement.

## 2025-03-18 NOTE — NURSING NOTE
Patient is visible on the unit.  Occasionally walking in the halls. He denies SI/HI and A/V hallucinations.  He is compliant and calm.

## 2025-03-18 NOTE — ASSESSMENT & PLAN NOTE
"  Patient continues to do well and the plan is to continue with the current treatment plan as mentioned below with no changes.    Patient continues to be pending group home placement. Otherwise no clinical significant change.   continues to look for group home/personal-care home placement however this can be significantly impacted by patient no longer having money in the [employer] account and his bank account being over drawn.  Patient is quite altruistic in his desire not to have his friend go to prison however continues to display poor to limited insight regarding his ability to advocate for himself and organize/prioritize his own needs given his own difficult social situation. Would be at significant risk of harm if not discharged to a structured setting with support.    Medication regimen as follows, no changes as of 3/18/2025:  Abilify 5 mg daily as mood adjunct   Zoloft 150 mg daily for depression and anxiety  Continue to encourage participation in group therapy, milieu therapy and occupational therapy.  Continue to assess for side effects of medications.  Continue collaboration with SLIM for medical co-morbidities as indicated.  Continue discussion with CM/SW to assist with obtaining collateral, disposition planning, and the implementation of patient-centered individualized plan of care.  Continue frequent safety checks and vitals per unit protocol.    Risks, benefits and possible side effects of Medications: Risks, benefits, and possible side effects of medications have previously been explained. No new medications at this time.      Legal status: 201    Disposition: to be determined, pending SOAR application for potential group home placement. OT Cognitive Evaluation completed: \"Pt would benefit from discharge to a supportive environment that can provide checks for safety and compliance with IADLs. \"   Although patient's mood has stabilized, they are currently awaiting group home placement.  Their " ability to recognize safety hazards take medications and participate in IADLs are significantly impaired by their cognitive deficits compounded by their chronic mental health needs and would be at risk for significant decompensation without the structure and support of a group home setting.      No associated orders from this encounter found during lookback period of 72 hours.

## 2025-03-19 PROCEDURE — 99232 SBSQ HOSP IP/OBS MODERATE 35: CPT | Performed by: PSYCHIATRY & NEUROLOGY

## 2025-03-19 RX ADMIN — Medication 2500 UNITS: at 08:15

## 2025-03-19 RX ADMIN — FAMOTIDINE 20 MG: 20 TABLET ORAL at 08:15

## 2025-03-19 RX ADMIN — FAMOTIDINE 20 MG: 20 TABLET ORAL at 17:12

## 2025-03-19 RX ADMIN — SERTRALINE HYDROCHLORIDE 150 MG: 100 TABLET ORAL at 08:16

## 2025-03-19 RX ADMIN — ATORVASTATIN CALCIUM 40 MG: 40 TABLET, FILM COATED ORAL at 16:32

## 2025-03-19 RX ADMIN — ARIPIPRAZOLE 5 MG: 5 TABLET ORAL at 08:16

## 2025-03-19 RX ADMIN — SENNOSIDES AND DOCUSATE SODIUM 2 TABLET: 50; 8.6 TABLET ORAL at 17:11

## 2025-03-19 RX ADMIN — LEVOTHYROXINE SODIUM 37.5 MCG: 0.07 TABLET ORAL at 06:38

## 2025-03-19 RX ADMIN — CYANOCOBALAMIN TAB 1000 MCG 1000 MCG: 1000 TAB at 08:16

## 2025-03-19 NOTE — SOCIAL WORK
Cm spoke with Pt regarding interview for today with personal care home. Pt reported he is looking forward to the interview. Cm and Pt discussed future goals and activities that Pt wishes to be involved in once he discharges from the hospital. Cm prepared Pt for the interview. Pt was receptive.   Personal Care home owner arrived and Pt participated in interview with no issues.

## 2025-03-19 NOTE — NURSING NOTE
Visible and doing some laps in the hallway at the start of the shift. Pleasant. Spoke about upcoming meeting he will be having with personal care home. States that he is staying positive and hopes to be leaving soon. Denies symptoms. Compliant with medications.

## 2025-03-19 NOTE — PLAN OF CARE
Pt attends groups intermittently. Pt typically quiet, but participates appropriately when prompted. Pt excited, but also appropriately anxious that the placement process appears to have forward movement.

## 2025-03-19 NOTE — ASSESSMENT & PLAN NOTE
"  Patient continues to do well and the plan is to continue with the current treatment plan as mentioned below with no changes.    Patient continues to be pending group home placement. Otherwise no clinical significant change.   continues to look for group home/personal-care home placement however this can be significantly impacted by patient no longer having money in the [employer] account and his bank account being over drawn.  Patient is quite altruistic in his desire not to have his friend go to [facility] however continues to display poor to limited insight regarding his ability to advocate for himself and organize/prioritize his own needs given his own difficult social situation. Would be at significant risk of harm if not discharged to a structured setting with support.    Medication regimen as follows, no changes as of 3/19/2025:  Abilify 5 mg daily as mood adjunct   Zoloft 150 mg daily for depression and anxiety  Continue to encourage participation in group therapy, milieu therapy and occupational therapy.  Continue to assess for side effects of medications.  Continue collaboration with [facility] for medical co-morbidities as indicated.  Continue discussion with CM/SW to assist with obtaining collateral, disposition planning, and the implementation of patient-centered individualized plan of care.  Continue frequent safety checks and vitals per unit protocol.    Risks, benefits and possible side effects of Medications: Risks, benefits, and possible side effects of medications have previously been explained. No new medications at this time.      Legal status: 201    Disposition: to be determined, pending SOAR application for potential group home placement. OT Cognitive Evaluation completed: \"Pt would benefit from discharge to a supportive environment that can provide checks for safety and compliance with IADLs. \"   Although patient's mood has stabilized, they are currently awaiting group home placement.  Their " ability to recognize safety hazards take medications and participate in IADLs are significantly impaired by their cognitive deficits compounded by their chronic mental health needs and would be at risk for significant decompensation without the structure and support of a group home setting.      No associated orders from this encounter found during lookback period of 72 hours.

## 2025-03-19 NOTE — PROGRESS NOTES
Progress Note - Behavioral Health   Name: Franco Roberson 39 y.o. male I MRN: [id number]  Unit/Bed#: -01 I Date of Admission: 5/14/2024   Date of Service: 3/19/2025 I Hospital Day: 309     Assessment & Plan  MDD (major depressive disorder), recurrent severe, without psychosis (HCC)    Patient continues to do well and the plan is to continue with the current treatment plan as mentioned below with no changes.    Patient continues to be pending group home placement. Otherwise no clinical significant change.   continues to look for group home/personal-care home placement however this can be significantly impacted by patient no longer having money in the [employer] account and his bank account being over drawn.  Patient is quite altruistic in his desire not to have his friend go to [facility] however continues to display poor to limited insight regarding his ability to advocate for himself and organize/prioritize his own needs given his own difficult social situation. Would be at significant risk of harm if not discharged to a structured setting with support.    Medication regimen as follows, no changes as of 3/19/2025:  Abilify 5 mg daily as mood adjunct   Zoloft 150 mg daily for depression and anxiety  Continue to encourage participation in group therapy, milieu therapy and occupational therapy.  Continue to assess for side effects of medications.  Continue collaboration with PATRICIA for medical co-morbidities as indicated.  Continue discussion with CM/SW to assist with obtaining collateral, disposition planning, and the implementation of patient-centered individualized plan of care.  Continue frequent safety checks and vitals per unit protocol.    Risks, benefits and possible side effects of Medications: Risks, benefits, and possible side effects of medications have previously been explained. No new medications at this time.      Legal status: 201    Disposition: to be determined, pending SOAR application for  "potential group home placement. OT Cognitive Evaluation completed: \"Pt would benefit from discharge to a supportive environment that can provide checks for safety and compliance with IADLs. \"   Although patient's mood has stabilized, they are currently awaiting group home placement.  Their ability to recognize safety hazards take medications and participate in IADLs are significantly impaired by their cognitive deficits compounded by their chronic mental health needs and would be at risk for significant decompensation without the structure and support of a group home setting.      No associated orders from this encounter found during lookback period of 72 hours.  Autism spectrum disorder  Continue supportive care    No associated orders from this encounter found during lookback period of 72 hours.    Hyperlipidemia  - Lipitor 40 mg       Plan     Recommended Treatment:    - Encourage early mobility and having a structured day  - Provide frequent re-orientation, and cognitive stimulation  - Ensure assistive devices are in proper working order (eye-glasses, hearing aids)  - Encourage adequate hydration, nutrition and monitor bowel movements  - Maintain sleep-wake cycle: Uninterrupted sleep time; low-level lighting at night  - Fall precaution  - f/u [facility] recs regarding the medical problems   - Continue medication titration and treatment plan; adjust medication to optimize treatment response and as clinically indicated. .   - Observation: routine            - VS: as per unit protocol  - Diet: Regular diet  - Encourage group attendance and milieu therapy  - Dispo: To be determined     Scheduled medications:  Current Facility-Administered Medications   Medication Dose Route Frequency Provider Last Rate    acetaminophen  650 mg Oral Q6H PRN Basilio Brown MD      acetaminophen  650 mg Oral Q4H PRN Basilio Brown MD      acetaminophen  975 mg Oral Q6H PRN Basilio Brown MD      aluminum-magnesium " hydroxide-simethicone  30 mL Oral Q4H PRN Basilio Brown MD      ARIPiprazole  5 mg Oral Daily Basilio Panda MD      Artificial Tears  1 drop Both Eyes Q3H PRN Basilio Brown MD      atorvastatin  40 mg Oral Daily With Dinner WALLY Baptiste      benztropine  1 mg Intramuscular Q4H PRN Max 6/day Basilio Brown MD      benztropine  1 mg Oral Q4H PRN Max 6/day Basilio Brown MD      Cholecalciferol  2,500 Units Oral Daily Dustin Mcallister MD      cyanocobalamin  1,000 mcg Oral Daily WALLY Baptiste      Diclofenac Sodium  2 g Topical 4x Daily PRN WALLY Baptiste      hydrOXYzine HCL  50 mg Oral Q6H PRN Max 4/day Basilio rBown MD      Or    diphenhydrAMINE  50 mg Intramuscular Q6H PRN Basilio Brown MD      diphenhydrAMINE-zinc acetate   Topical Daily PRN Raj Guerrero MD      famotidine  20 mg Oral BID WALLY Baptiste      hydrOXYzine HCL  100 mg Oral Q6H PRN Max 4/day Basilio Brown MD      Or    LORazepam  2 mg Intramuscular Q6H PRN Basilio Brown MD      hydrOXYzine HCL  25 mg Oral Q6H PRN Max 4/day Basilio Brown MD      levothyroxine  37.5 mcg Oral Early Morning WALLY Baptiste      melatonin  6 mg Oral HS PRN WALLY Gomez      methocarbamol  500 mg Oral Q6H PRN WALLY Baptiste      OLANZapine  5 mg Oral Q4H PRN Max 3/day Basilio Brown MD      Or    OLANZapine  2.5 mg Intramuscular Q4H PRN Max 3/day Basilio Brown MD      OLANZapine  5 mg Oral Q3H PRN Max 3/day Basilio Brown MD      Or    OLANZapine  5 mg Intramuscular Q3H PRN Max 3/day Basilio Brown MD      OLANZapine  2.5 mg Oral Q4H PRN Max 6/day Basilio Brown MD      polyethylene glycol  17 g Oral Daily PRN Basilio Brown MD      propranolol  10 mg Oral Q8H PRN Basilio Brown MD      senna-docusate sodium  2 tablet Oral After Dinner Dustin Mcallister MD      sertraline  150 mg Oral  "Daily WALLY Gomez      traZODone  50 mg Oral HS PRN Basilio Brown MD        PRN:    acetaminophen    acetaminophen    acetaminophen    aluminum-magnesium hydroxide-simethicone    Artificial Tears    benztropine    benztropine    Diclofenac Sodium    hydrOXYzine HCL **OR** diphenhydrAMINE    diphenhydrAMINE-zinc acetate    hydrOXYzine HCL **OR** LORazepam    hydrOXYzine HCL    melatonin    methocarbamol    OLANZapine **OR** OLANZapine    OLANZapine **OR** OLANZapine    OLANZapine    polyethylene glycol    propranolol    traZODone       Subjective     Patient was visited on unit for continuing care; chart reviewed and discussed with multidisciplinary treatment team.  On approach, the patient was calm and cooperative. Denied any changes in mood, appetite, and energy level. No problem initiating and maintaining sleep.  Denied A/VH currently.  Denied SI/HI, intent or plan upon direct inquiry at this time.  As per case management patient has a meeting with one of the personal-care home set up for either today or tomorrow.  Patient is aware of this.  He is looking forward to this meeting and appears affectively somewhat brighter.    Patient continues to be visible in the milieu and interacts with staff and peers. No reports of aggression or self-injurious behavior on unit. No PRN medications used in the past 24 hours.    Patient accepted all offered medications and no adverse effects of medications noted or reported.    Objective    Current Mental Status Evaluation:  Mental Status Exam  Appearance: casually dressed, consistent with stated age  Motor: no psychomotor retardation, no gait abnormalities  Behavior: cooperative, answers questions appropriately  Speech: soft, normal rhythm  Mood: \"good\"  Affect: euthymic  Thought Process: concrete  Thought Content: denies delusions  Risk Potential: denies suicidal ideation, plan, or intent. Denies homicidal ideation  Perceptions: denies auditory " hallucinations, denies visual hallucinations,   Sensorium: Oriented to person, place, time, and situation  Cognition: cognitive ability appears intact but was not quantitatively tested  Consciousness: alert and awake  Attention: intact, able to focus without difficulty  Insight: limited  Judgement: limited            Vital signs in last 24 hours:    Temp:  [97.6 °F (36.4 °C)-98.1 °F (36.7 °C)] 98.1 °F (36.7 °C)  HR:  [81-88] 81  BP: (136-143)/(87-95) 143/95  Resp:  [16] 16  SpO2:  [95 %-96 %] 96 %  O2 Device: None (Room air)    Psychiatric Review of Systems  Medication Adverse Effects: see HPI  Behaviors: see HPI  Sleep: unchanged  Appetite: unchanged    Laboratory results:    I have personally reviewed all pertinent laboratory/tests results  No results found for this or any previous visit (from the past 48 hours).       Progress Toward Goals & Illness Status:   progressing, continues to improve, attends groups, participates in milieu therapy, mood is stabilizing, placement pending    Patient is not at goal. They are not yet ready for discharge. The patient's condition currently requires active psychopharmacological medication management, interdisciplinary coordination with case management, and the utilization of adjunctive milieu and group therapy to augment psychopharmacological efficacy. The patient's risk of morbidity, and progression or decompensation of psychiatric disease, is higher without this current treatment.     Next of Kin  Extended Emergency Contact Information  Primary Emergency Contact: Lois Roberson  Address: [address] [place] [address] [place]  Home Phone: [phone]  Relation: Mother    Counseling / Coordination of Care  Patient's progress discussed with staff in treatment team meeting.  Medications, treatment progress and treatment plan reviewed with patient.  Medication education provided to patient.  Educated on importance of medication and  treatment compliance.  Supportive therapy provided to patient.  Cognitive techniques utilized during the session.  Reassurance and supportive therapy provided.  Reoriented to reality and reassured.  Encouraged participation in milieu and group therapy on the unit.  Crisis/safety plan discussed with patient.       Dustin Mcallister MD  Attending Psychiatrist   [facility]      This note has been constructed using a voice recognition system. There may be translation, syntax, or grammatical errors. If you have any questions, please contact the dictating provider.

## 2025-03-19 NOTE — PLAN OF CARE
Problem: Ineffective Coping  Goal: Participates in unit activities  Description: Interventions:  - Provide therapeutic environment   - Provide required programming   - Redirect inappropriate behaviors   Outcome: Progressing     Problem: Depression  Goal: Treatment Goal: Demonstrate behavioral control of depressive symptoms, verbalize feelings of improved mood/affect, and adopt new coping skills prior to discharge  Outcome: Progressing  Goal: Verbalize thoughts and feelings  Description: Interventions:  - Assess and re-assess patient's level of risk   - Engage patient in 1:1 interactions, daily, for a minimum of 15 minutes   - Encourage patient to express feelings, fears, frustrations, hopes   Outcome: Progressing  Goal: Refrain from harming self  Description: Interventions:  - Monitor patient closely, per order   - Supervise medication ingestion, monitor effects and side effects   Outcome: Progressing  Goal: Refrain from isolation  Description: Interventions:  - Develop a trusting relationship   - Encourage socialization   Outcome: Progressing     Problem: Anxiety  Goal: Anxiety is at manageable level  Description: Interventions:  - Assess and monitor patient's anxiety level.   - Monitor for signs and symptoms (heart palpitations, chest pain, shortness of breath, headaches, nausea, feeling jumpy, restlessness, irritable, apprehensive).   - Collaborate with interdisciplinary team and initiate plan and interventions as ordered.  - [place] patient to unit/surroundings  - Explain treatment plan  - Encourage participation in care  - Encourage verbalization of concerns/fears  - Identify coping mechanisms  - Assist in developing anxiety-reducing skills  - Administer/offer alternative therapies  - Limit or eliminate stimulants  Outcome: Progressing     Problem: DISCHARGE PLANNING - CARE MANAGEMENT  Goal: Discharge to post-acute care or home with appropriate resources  Description: INTERVENTIONS:  - Conduct assessment to  determine patient/family and health care team treatment goals, and need for post-acute services based on payer coverage, community resources, and patient preferences, and barriers to discharge  - Address psychosocial, clinical, and financial barriers to discharge as identified in assessment in conjunction with the patient/family and health care team  - Arrange appropriate level of post-acute services according to patient’s   needs and preference and payer coverage in collaboration with the physician and health care team  - Communicate with and update the patient/family, physician, and health care team regarding progress on the discharge plan  - Arrange appropriate transportation to post-acute venues  Outcome: Not Progressing     Problem: Knowledge Deficit  Goal: Patient/family/caregiver demonstrates understanding of disease process, treatment plan, medications, and discharge instructions  Description: Complete learning assessment and assess knowledge base.  Interventions:  - Provide teaching at level of understanding  - Provide teaching via preferred learning methods  Outcome: Progressing

## 2025-03-19 NOTE — PROGRESS NOTES
03/19/25 0835   Team Meeting   Meeting Type Daily Rounds   Team Members Present   Team Members Present Physician;Nurse;   Physician Team Member Ary   [facility]   Care Management Team Member Noa   Patient/Family Present   Patient Present No   Patient's Family Present No     Pt is calm and cooperative. Pt is medication and meal compliant. Pt denies SI/HI/AVH. Pt is social with select peers. Pt's discharge is pending personal care home.

## 2025-03-19 NOTE — NURSING NOTE
Pt calm, cooperative, pleasant. Denies any SI/HI/AVH at this time. Pt visible in milieu, social with select peers and staff. No HS medications scheduled, pt adherent with unit expectations. Currently no unmet needs. Q15 minute checks maintained for safety.

## 2025-03-20 PROCEDURE — 99232 SBSQ HOSP IP/OBS MODERATE 35: CPT | Performed by: PSYCHIATRY & NEUROLOGY

## 2025-03-20 RX ADMIN — ATORVASTATIN CALCIUM 40 MG: 40 TABLET, FILM COATED ORAL at 16:54

## 2025-03-20 RX ADMIN — Medication 2500 UNITS: at 08:14

## 2025-03-20 RX ADMIN — SERTRALINE HYDROCHLORIDE 150 MG: 100 TABLET ORAL at 08:14

## 2025-03-20 RX ADMIN — LEVOTHYROXINE SODIUM 37.5 MCG: 0.07 TABLET ORAL at 06:21

## 2025-03-20 RX ADMIN — FAMOTIDINE 20 MG: 20 TABLET ORAL at 17:06

## 2025-03-20 RX ADMIN — FAMOTIDINE 20 MG: 20 TABLET ORAL at 08:14

## 2025-03-20 RX ADMIN — SENNOSIDES AND DOCUSATE SODIUM 2 TABLET: 50; 8.6 TABLET ORAL at 17:06

## 2025-03-20 RX ADMIN — ARIPIPRAZOLE 5 MG: 5 TABLET ORAL at 08:14

## 2025-03-20 RX ADMIN — CYANOCOBALAMIN TAB 1000 MCG 1000 MCG: 1000 TAB at 08:14

## 2025-03-20 NOTE — NURSING NOTE
Pleasant, calm and cooperative. Denies symptoms. Compliant with medications. No complaints or concerns. Waiting placement.

## 2025-03-20 NOTE — NURSING NOTE
Patient visible on the unit,appeared to be in good spirits. Patient reported feeling meeting /interview with personal care home went well.Patient denies depression ,SI/HI/AH/VH at this time. Compliant with medications and routine vitals.

## 2025-03-20 NOTE — ASSESSMENT & PLAN NOTE
"  Patient continues to do well and the plan is to continue with the current treatment plan as mentioned below with no changes.    Patient continues to be pending group home placement. Otherwise no clinical significant change.   continues to look for group home/personal-care home placement however this can be significantly impacted by patient no longer having money in the [employer] account and his bank account being over drawn.  Patient is quite altruistic in his desire not to have his friend go to [facility] however continues to display poor to limited insight regarding his ability to advocate for himself and organize/prioritize his own needs given his own difficult social situation. Would be at significant risk of harm if not discharged to a structured setting with support.    Medication regimen as follows, no changes as of 3/20/2025:  Abilify 5 mg daily as mood adjunct   Zoloft 150 mg daily for depression and anxiety  Continue to encourage participation in group therapy, milieu therapy and occupational therapy.  Continue to assess for side effects of medications.  Continue collaboration with [facility] for medical co-morbidities as indicated.  Continue discussion with CM/SW to assist with obtaining collateral, disposition planning, and the implementation of patient-centered individualized plan of care.  Continue frequent safety checks and vitals per unit protocol.    Risks, benefits and possible side effects of Medications: Risks, benefits, and possible side effects of medications have previously been explained. No new medications at this time.      Legal status: 201    Disposition: to be determined, pending SOAR application for potential group home placement. OT Cognitive Evaluation completed: \"Pt would benefit from discharge to a supportive environment that can provide checks for safety and compliance with IADLs. \"   Although patient's mood has stabilized, they are currently awaiting group home placement.  Their " ability to recognize safety hazards take medications and participate in IADLs are significantly impaired by their cognitive deficits compounded by their chronic mental health needs and would be at risk for significant decompensation without the structure and support of a group home setting.      No associated orders from this encounter found during lookback period of 72 hours.

## 2025-03-20 NOTE — PROGRESS NOTES
03/20/25 0839   Team Meeting   Meeting Type Daily Rounds   Team Members Present   Team Members Present Physician;Nurse;   Physician Team Member Ary   [facility]   Patient/Family Present   Patient Present No   Patient's Family Present No     Pt med/meal compliant. Interview for [facility] yesterday. Pleasant, calm, cooperative. Discharge pending placement.

## 2025-03-20 NOTE — PROGRESS NOTES
Progress Note - Behavioral Health   Name: Franco Roberson 39 y.o. male I MRN: [id number]  Unit/Bed#: -01 I Date of Admission: 5/14/2024   Date of Service: 3/20/2025 I Hospital Day: 310     Assessment & Plan  MDD (major depressive disorder), recurrent severe, without psychosis (HCC)    Patient continues to do well and the plan is to continue with the current treatment plan as mentioned below with no changes.    Patient continues to be pending group home placement. Otherwise no clinical significant change.   continues to look for group home/personal-care home placement however this can be significantly impacted by patient no longer having money in the [employer] account and his bank account being over drawn.  Patient is quite altruistic in his desire not to have his friend go to prison however continues to display poor to limited insight regarding his ability to advocate for himself and organize/prioritize his own needs given his own difficult social situation. Would be at significant risk of harm if not discharged to a structured setting with support.    Medication regimen as follows, no changes as of 3/20/2025:  Abilify 5 mg daily as mood adjunct   Zoloft 150 mg daily for depression and anxiety  Continue to encourage participation in group therapy, milieu therapy and occupational therapy.  Continue to assess for side effects of medications.  Continue collaboration with PATRICIA for medical co-morbidities as indicated.  Continue discussion with CM/SW to assist with obtaining collateral, disposition planning, and the implementation of patient-centered individualized plan of care.  Continue frequent safety checks and vitals per unit protocol.    Risks, benefits and possible side effects of Medications: Risks, benefits, and possible side effects of medications have previously been explained. No new medications at this time.      Legal status: 201    Disposition: to be determined, pending SOAR application for  "potential group home placement. OT Cognitive Evaluation completed: \"Pt would benefit from discharge to a supportive environment that can provide checks for safety and compliance with IADLs. \"   Although patient's mood has stabilized, they are currently awaiting group home placement.  Their ability to recognize safety hazards take medications and participate in IADLs are significantly impaired by their cognitive deficits compounded by their chronic mental health needs and would be at risk for significant decompensation without the structure and support of a group home setting.      No associated orders from this encounter found during lookback period of 72 hours.  Autism spectrum disorder  Continue supportive care    No associated orders from this encounter found during lookback period of 72 hours.    Hyperlipidemia  - Lipitor 40 mg       Plan     Recommended Treatment:    - Encourage early mobility and having a structured day  - Provide frequent re-orientation, and cognitive stimulation  - Ensure assistive devices are in proper working order (eye-glasses, hearing aids)  - Encourage adequate hydration, nutrition and monitor bowel movements  - Maintain sleep-wake cycle: Uninterrupted sleep time; low-level lighting at night  - Fall precaution  - f/u SLIM recs regarding the medical problems   - Continue medication titration and treatment plan; adjust medication to optimize treatment response and as clinically indicated. .   - Observation: routine            - VS: as per unit protocol  - Diet: Regular diet  - Encourage group attendance and milieu therapy  - Dispo: To be determined     Scheduled medications:  Current Facility-Administered Medications   Medication Dose Route Frequency Provider Last Rate    acetaminophen  650 mg Oral Q6H PRN Basilio Brown MD      acetaminophen  650 mg Oral Q4H PRN Basilio Brown MD      acetaminophen  975 mg Oral Q6H PRN Basilio Brown MD      aluminum-magnesium " hydroxide-simethicone  30 mL Oral Q4H PRN Basilio Brown MD      ARIPiprazole  5 mg Oral Daily Basilio Padna MD      Artificial Tears  1 drop Both Eyes Q3H PRN Basilio Brown MD      atorvastatin  40 mg Oral Daily With Dinner WALLY Baptiste      benztropine  1 mg Intramuscular Q4H PRN Max 6/day Basilio Brown MD      benztropine  1 mg Oral Q4H PRN Max 6/day Basilio Brown MD      Cholecalciferol  2,500 Units Oral Daily Dustin Mcallister MD      cyanocobalamin  1,000 mcg Oral Daily WALLY Baptiste      Diclofenac Sodium  2 g Topical 4x Daily PRN WALLY Baptiste      hydrOXYzine HCL  50 mg Oral Q6H PRN Max 4/day Basilio Brown MD      Or    diphenhydrAMINE  50 mg Intramuscular Q6H PRN Basilio Brown MD      diphenhydrAMINE-zinc acetate   Topical Daily PRN Raj Guerrero MD      famotidine  20 mg Oral BID WALLY Baptiste      hydrOXYzine HCL  100 mg Oral Q6H PRN Max 4/day Basilio Brown MD      Or    LORazepam  2 mg Intramuscular Q6H PRN Basilio Brown MD      hydrOXYzine HCL  25 mg Oral Q6H PRN Max 4/day Basilio Brown MD      levothyroxine  37.5 mcg Oral Early Morning WALLY Baptiste      melatonin  6 mg Oral HS PRN WALLY Gomez      methocarbamol  500 mg Oral Q6H PRN WALLY Baptiste      OLANZapine  5 mg Oral Q4H PRN Max 3/day Basilio Brown MD      Or    OLANZapine  2.5 mg Intramuscular Q4H PRN Max 3/day Basilio Brown MD      OLANZapine  5 mg Oral Q3H PRN Max 3/day Basilio Brown MD      Or    OLANZapine  5 mg Intramuscular Q3H PRN Max 3/day Basilio Brown MD      OLANZapine  2.5 mg Oral Q4H PRN Max 6/day Basilio Brown MD      polyethylene glycol  17 g Oral Daily PRN Basilio Brown MD      propranolol  10 mg Oral Q8H PRN Basilio Brown MD      senna-docusate sodium  2 tablet Oral After Dinner Dustin Mcallister MD      sertraline  150 mg Oral  "Daily WALLY Gomez      traZODone  50 mg Oral HS PRN Basilio Brown MD        PRN:    acetaminophen    acetaminophen    acetaminophen    aluminum-magnesium hydroxide-simethicone    Artificial Tears    benztropine    benztropine    Diclofenac Sodium    hydrOXYzine HCL **OR** diphenhydrAMINE    diphenhydrAMINE-zinc acetate    hydrOXYzine HCL **OR** LORazepam    hydrOXYzine HCL    melatonin    methocarbamol    OLANZapine **OR** OLANZapine    OLANZapine **OR** OLANZapine    OLANZapine    polyethylene glycol    propranolol    traZODone       Subjective     Patient was visited on unit for continuing care; chart reviewed and discussed with multidisciplinary treatment team.  On approach, the patient was calm and cooperative. Denied any changes in mood, appetite, and energy level. No problem initiating and maintaining sleep.  Denied A/VH currently.  Denied SI/HI, intent or plan upon direct inquiry at this time. Pt reports that he believes that meeting w/ personal care home went well. He is looking forward to them getting back to us.    Patient continues to be visible in the milieu and interacts with staff and peers. No reports of aggression or self-injurious behavior on unit. No PRN medications used in the past 24 hours.    Patient accepted all offered medications and no adverse effects of medications noted or reported.    Objective    Current Mental Status Evaluation:  Mental Status Exam  Appearance: casually dressed, consistent with stated age  Motor: no psychomotor retardation, no gait abnormalities  Behavior: cooperative, answers questions appropriately  Speech: soft, normal rhythm  Mood: \"good\"  Affect: blunted  Thought Process: concrete  Thought Content: denies delusions  Risk Potential: denies suicidal ideation, plan, or intent. Denies homicidal ideation  Perceptions: denies auditory hallucinations, denies visual hallucinations,   Sensorium: Oriented to person, place, time, and situation  Cognition: " cognitive ability appears intact but was not quantitatively tested  Consciousness: alert and awake  Attention: intact, able to focus without difficulty  Insight: limited  Judgement: limited            Vital signs in last 24 hours:    Temp:  [98 °F (36.7 °C)-98.4 °F (36.9 °C)] 98.4 °F (36.9 °C)  HR:  [69-77] 69  BP: (105-125)/(66-71) 125/71  Resp:  [16-17] 16  SpO2:  [97 %] 97 %  O2 Device: None (Room air)    Psychiatric Review of Systems  Medication Adverse Effects: see HPI  Behaviors: see HPI  Sleep: unchanged  Appetite: unchanged    Laboratory results:    I have personally reviewed all pertinent laboratory/tests results  No results found for this or any previous visit (from the past 48 hours).       Progress Toward Goals & Illness Status:   progressing, attends groups, participates in milieu therapy, placement pending    Patient is not at goal. They are not yet ready for discharge. The patient's condition currently requires active psychopharmacological medication management, interdisciplinary coordination with case management, and the utilization of adjunctive milieu and group therapy to augment psychopharmacological efficacy. The patient's risk of morbidity, and progression or decompensation of psychiatric disease, is higher without this current treatment.     Next of Kin  Extended Emergency Contact Information  Primary Emergency Contact: Lois Roberson  Address: [address] [place] [address] of [place]  Home Phone: [phone]  Relation: Mother    Counseling / Coordination of Care  Patient's progress discussed with staff in treatment team meeting.  Medications, treatment progress and treatment plan reviewed with patient.  Medication education provided to patient.  Educated on importance of medication and treatment compliance.  Supportive therapy provided to patient.  Cognitive techniques utilized during the session.  Reassurance and supportive therapy  provided.  Reoriented to reality and reassured.  Encouraged participation in milieu and group therapy on the unit.  Crisis/safety plan discussed with patient.       Dustin Mcallister MD  Attending Psychiatrist   [facility]      This note has been constructed using a voice recognition system. There may be translation, syntax, or grammatical errors. If you have any questions, please contact the dictating provider.

## 2025-03-20 NOTE — PLAN OF CARE
Problem: Depression  Goal: Treatment Goal: Demonstrate behavioral control of depressive symptoms, verbalize feelings of improved mood/affect, and adopt new coping skills prior to discharge  Outcome: Progressing  Goal: Verbalize thoughts and feelings  Description: Interventions:  - Assess and re-assess patient's level of risk   - Engage patient in 1:1 interactions, daily, for a minimum of 15 minutes   - Encourage patient to express feelings, fears, frustrations, hopes   Outcome: Progressing  Goal: Refrain from harming self  Description: Interventions:  - Monitor patient closely, per order   - Supervise medication ingestion, monitor effects and side effects   Outcome: Progressing  Goal: Refrain from isolation  Description: Interventions:  - Develop a trusting relationship   - Encourage socialization   Outcome: Progressing     Problem: Anxiety  Goal: Anxiety is at manageable level  Description: Interventions:  - Assess and monitor patient's anxiety level.   - Monitor for signs and symptoms (heart palpitations, chest pain, shortness of breath, headaches, nausea, feeling jumpy, restlessness, irritable, apprehensive).   - Collaborate with interdisciplinary team and initiate plan and interventions as ordered.  - [place] patient to unit/surroundings  - Explain treatment plan  - Encourage participation in care  - Encourage verbalization of concerns/fears  - Identify coping mechanisms  - Assist in developing anxiety-reducing skills  - Administer/offer alternative therapies  - Limit or eliminate stimulants  Outcome: Progressing     Problem: DISCHARGE PLANNING - CARE MANAGEMENT  Goal: Discharge to post-acute care or home with appropriate resources  Description: INTERVENTIONS:  - Conduct assessment to determine patient/family and health care team treatment goals, and need for post-acute services based on payer coverage, community resources, and patient preferences, and barriers to discharge  - Address psychosocial, clinical,  and financial barriers to discharge as identified in assessment in conjunction with the patient/family and health care team  - Arrange appropriate level of post-acute services according to patient’s   needs and preference and payer coverage in collaboration with the physician and health care team  - Communicate with and update the patient/family, physician, and health care team regarding progress on the discharge plan  - Arrange appropriate transportation to post-acute venues  Outcome: Progressing     Problem: Knowledge Deficit  Goal: Patient/family/caregiver demonstrates understanding of disease process, treatment plan, medications, and discharge instructions  Description: Complete learning assessment and assess knowledge base.  Interventions:  - Provide teaching at level of understanding  - Provide teaching via preferred learning methods  Outcome: Progressing     Problem: SLEEP DISTURBANCE  Goal: Will exhibit normal sleeping pattern  Description: Interventions:  -  Assess the patients sleep pattern, noting recent changes  - Administer medication as ordered  - Decrease environmental stimuli, including noise, as appropriate during the night  - Encourage the patient to actively participate in unit groups and or exercise during the day to enhance ability to achieve adequate sleep at night  - Assess the patient, in the morning, encouraging a description of sleep experience  Outcome: Progressing

## 2025-03-21 PROCEDURE — 99232 SBSQ HOSP IP/OBS MODERATE 35: CPT | Performed by: PSYCHIATRY & NEUROLOGY

## 2025-03-21 RX ADMIN — ARIPIPRAZOLE 5 MG: 5 TABLET ORAL at 09:44

## 2025-03-21 RX ADMIN — SERTRALINE HYDROCHLORIDE 150 MG: 100 TABLET ORAL at 09:44

## 2025-03-21 RX ADMIN — LEVOTHYROXINE SODIUM 37.5 MCG: 0.07 TABLET ORAL at 06:19

## 2025-03-21 RX ADMIN — Medication 2500 UNITS: at 09:44

## 2025-03-21 RX ADMIN — FAMOTIDINE 20 MG: 20 TABLET ORAL at 09:44

## 2025-03-21 RX ADMIN — SENNOSIDES AND DOCUSATE SODIUM 2 TABLET: 50; 8.6 TABLET ORAL at 17:08

## 2025-03-21 RX ADMIN — ATORVASTATIN CALCIUM 40 MG: 40 TABLET, FILM COATED ORAL at 15:36

## 2025-03-21 RX ADMIN — CYANOCOBALAMIN TAB 1000 MCG 1000 MCG: 1000 TAB at 09:44

## 2025-03-21 RX ADMIN — FAMOTIDINE 20 MG: 20 TABLET ORAL at 17:08

## 2025-03-21 NOTE — PROGRESS NOTES
Progress Note - Behavioral Health   Name: Franco Roberson 39 y.o. male I MRN: [id number]  Unit/Bed#: -02 I Date of Admission: 5/14/2024   Date of Service: 3/21/2025 I Hospital Day: 311     Assessment & Plan  MDD (major depressive disorder), recurrent severe, without psychosis (HCC)    Patient continues to do well and the plan is to continue with the current treatment plan as mentioned below with no changes.    Patient continues to be pending group home placement. Otherwise no clinical significant change.   continues to look for group home/personal-care home placement however this can be significantly impacted by patient no longer having money in the [employer] account and his bank account being over drawn.  Patient is quite altruistic in his desire not to have his friend go to [facility] however continues to display poor to limited insight regarding his ability to advocate for himself and organize/prioritize his own needs given his own difficult social situation. Would be at significant risk of harm if not discharged to a structured setting with support.    Medication regimen as follows, no changes as of 3/20/2025:  Abilify 5 mg daily as mood adjunct   Zoloft 150 mg daily for depression and anxiety  Continue to encourage participation in group therapy, milieu therapy and occupational therapy.  Continue to assess for side effects of medications.  Continue collaboration with PATRICIA for medical co-morbidities as indicated.  Continue discussion with CM/SW to assist with obtaining collateral, disposition planning, and the implementation of patient-centered individualized plan of care.  Continue frequent safety checks and vitals per unit protocol.    Risks, benefits and possible side effects of Medications: Risks, benefits, and possible side effects of medications have previously been explained. No new medications at this time.      Legal status: 201    Disposition: to be determined, pending SOAR application for  "potential group home placement. OT Cognitive Evaluation completed: \"Pt would benefit from discharge to a supportive environment that can provide checks for safety and compliance with IADLs. \"   Although patient's mood has stabilized, they are currently awaiting group home placement.  Their ability to recognize safety hazards take medications and participate in IADLs are significantly impaired by their cognitive deficits compounded by their chronic mental health needs and would be at risk for significant decompensation without the structure and support of a group home setting.      No associated orders from this encounter found during lookback period of 72 hours.  Autism spectrum disorder  Continue supportive care    No associated orders from this encounter found during lookback period of 72 hours.    Hyperlipidemia  - Lipitor 40 mg       Plan     Recommended Treatment:    - Encourage early mobility and having a structured day  - Provide frequent re-orientation, and cognitive stimulation  - Ensure assistive devices are in proper working order (eye-glasses, hearing aids)  - Encourage adequate hydration, nutrition and monitor bowel movements  - Maintain sleep-wake cycle: Uninterrupted sleep time; low-level lighting at night  - Fall precaution  - f/u SLIM recs regarding the medical problems   - Continue medication titration and treatment plan; adjust medication to optimize treatment response and as clinically indicated. .   - Observation: routine            - VS: as per unit protocol  - Diet: Regular diet  - Encourage group attendance and milieu therapy  - Dispo: To be determined     Scheduled medications:  Current Facility-Administered Medications   Medication Dose Route Frequency Provider Last Rate    acetaminophen  650 mg Oral Q6H PRN Basilio Brown MD      acetaminophen  650 mg Oral Q4H PRN Basilio Brown MD      acetaminophen  975 mg Oral Q6H PRN Basilio Brown MD      aluminum-magnesium " hydroxide-simethicone  30 mL Oral Q4H PRN Basilio Brown MD      ARIPiprazole  5 mg Oral Daily Basilio Panda MD      Artificial Tears  1 drop Both Eyes Q3H PRN Basilio Brown MD      atorvastatin  40 mg Oral Daily With Dinner WALLY Baptiste      benztropine  1 mg Intramuscular Q4H PRN Max 6/day Basilio Brown MD      benztropine  1 mg Oral Q4H PRN Max 6/day Basilio Brown MD      Cholecalciferol  2,500 Units Oral Daily Dustin Mcallister MD      cyanocobalamin  1,000 mcg Oral Daily WALLY Baptiste      Diclofenac Sodium  2 g Topical 4x Daily PRN WALLY Baptiste      hydrOXYzine HCL  50 mg Oral Q6H PRN Max 4/day Basilio Brown MD      Or    diphenhydrAMINE  50 mg Intramuscular Q6H PRN Basilio Brown MD      diphenhydrAMINE-zinc acetate   Topical Daily PRN Raj Guerrero MD      famotidine  20 mg Oral BID WALLY Baptiste      hydrOXYzine HCL  100 mg Oral Q6H PRN Max 4/day Basilio Brown MD      Or    LORazepam  2 mg Intramuscular Q6H PRN Basilio Brown MD      hydrOXYzine HCL  25 mg Oral Q6H PRN Max 4/day Basilio Brown MD      levothyroxine  37.5 mcg Oral Early Morning WALLY Baptiste      melatonin  6 mg Oral HS PRN WALLY Gomez      methocarbamol  500 mg Oral Q6H PRN WALLY Baptiste      OLANZapine  5 mg Oral Q4H PRN Max 3/day Basilio Brown MD      Or    OLANZapine  2.5 mg Intramuscular Q4H PRN Max 3/day Basilio Brown MD      OLANZapine  5 mg Oral Q3H PRN Max 3/day Basilio Brown MD      Or    OLANZapine  5 mg Intramuscular Q3H PRN Max 3/day Basilio Brown MD      OLANZapine  2.5 mg Oral Q4H PRN Max 6/day Basilio Brown MD      polyethylene glycol  17 g Oral Daily PRN Basilio Brown MD      propranolol  10 mg Oral Q8H PRN Basilio Brown MD      senna-docusate sodium  2 tablet Oral After Dinner Dustin Mcallister MD      sertraline  150 mg Oral  Daily WALLY Gomez      traZODone  50 mg Oral HS PRN Basilio Brown MD        PRN:    acetaminophen    acetaminophen    acetaminophen    aluminum-magnesium hydroxide-simethicone    Artificial Tears    benztropine    benztropine    Diclofenac Sodium    hydrOXYzine HCL **OR** diphenhydrAMINE    diphenhydrAMINE-zinc acetate    hydrOXYzine HCL **OR** LORazepam    hydrOXYzine HCL    melatonin    methocarbamol    OLANZapine **OR** OLANZapine    OLANZapine **OR** OLANZapine    OLANZapine    polyethylene glycol    propranolol    traZODone       Subjective     Patient was visited on unit for continuing care; chart reviewed and discussed with multidisciplinary treatment team.  On approach, the patient was calm and cooperative. Denied any changes in mood, appetite, and energy level. No problem initiating and maintaining sleep.  Denied A/VH currently.  Denied SI/HI, intent or plan upon direct inquiry at this time.  Patient reports some anxiety regarding the prospect of transitioning to personal-care home but overall is feeling very positive about it.  Patient noted to have more of a euthymic to bright expression when discussing this.  He otherwise has no acute complaints or concerns at this time.  Case management continuing to work with personal-care home.  They are expected to email over some paperwork to begin the process.    Patient continues to be selectively interactive with staff and peers. No reports of aggression or self-injurious behavior on unit. No PRN medications used in the past 24 hours.    Patient accepted all offered medications and no adverse effects of medications noted or reported.    Objective    Current Mental Status Evaluation:  Mental Status Exam  Appearance: casually dressed, appears consistent with stated age  Motor: no psychomotor disturbances, no gait abnormalities  Behavior: cooperative, interacts with this writer appropriately  Speech: normal rate, rhythm, and volume  Mood:  "\"good\"  Affect: euthymic, normal range and intensity  Thought Process: concrete  Thought Content:  denies delusions or paranoia  Perception: denies auditory hallucinations, denies visual hallucinations,  Risk Potential: denies suicidal ideation, plan, or intent. Denies homicidal ideation  Sensorium: Oriented to person, place, time, and situation  Cognition: cognitive ability appears intact but was not quantitatively tested  Consciousness: alert and awake  Attention: able to focus without difficulty  Insight: limited  Judgement: limited            Vital signs in last 24 hours:    Temp:  [96.9 °F (36.1 °C)-97.3 °F (36.3 °C)] 97.3 °F (36.3 °C)  HR:  [62-83] 62  BP: (116-119)/(69-73) 116/69  Resp:  [16-17] 16  SpO2:  [95 %-97 %] 97 %  O2 Device: None (Room air)    Psychiatric Review of Systems  Medication Adverse Effects: see HPI  Behaviors: see HPI  Sleep: unchanged  Appetite: unchanged    Laboratory results:    I have personally reviewed all pertinent laboratory/tests results  No results found for this or any previous visit (from the past 48 hours).       Progress Toward Goals & Illness Status:   progressing, attends groups, participates in milieu therapy, mood is stabilizing, depression is improving    Patient is not at goal. They are not yet ready for discharge. The patient's condition currently requires active psychopharmacological medication management, interdisciplinary coordination with case management, and the utilization of adjunctive milieu and group therapy to augment psychopharmacological efficacy. The patient's risk of morbidity, and progression or decompensation of psychiatric disease, is higher without this current treatment.     Next of Kin  Extended Emergency Contact Information  Primary Emergency Contact: RobersonTristonLois  Address: [place]           [id number] [place] [address] of [place]  Home Phone: [phone]  Relation: Mother    Counseling / Coordination of " Care  Patient's progress discussed with staff in treatment team meeting.  Medications, treatment progress and treatment plan reviewed with patient.  Medication education provided to patient.  Educated on importance of medication and treatment compliance.  Supportive therapy provided to patient.  Cognitive techniques utilized during the session.  Reassurance and supportive therapy provided.  Reoriented to reality and reassured.  Encouraged participation in milieu and group therapy on the unit.  Crisis/safety plan discussed with patient.       Dustin Mcallister MD  Attending Psychiatrist   [facility]      This note has been constructed using a voice recognition system. There may be translation, syntax, or grammatical errors. If you have any questions, please contact the dictating provider.

## 2025-03-21 NOTE — SOCIAL WORK
Cm contacted [facility] owner inquiring on the next steps. They reported Pt is a good candidate. The personal care home reported they will send an email with further documentation.   Cm followed up this morning, and they reported they will check with administration regarding the email that was supposed to be sent.

## 2025-03-21 NOTE — NURSING NOTE
Patient visible on the unit, appears to be withdrawn to self. Patient makes needs known eats meals in room. Patient denies SI/HI/AH/VH at this time. Compliant with medications and routine vitals.

## 2025-03-21 NOTE — PLAN OF CARE
Problem: Ineffective Coping  Goal: Participates in unit activities  Description: Interventions:  - Provide therapeutic environment   - Provide required programming   - Redirect inappropriate behaviors   Outcome: Progressing     Problem: Depression  Goal: Treatment Goal: Demonstrate behavioral control of depressive symptoms, verbalize feelings of improved mood/affect, and adopt new coping skills prior to discharge  Outcome: Progressing  Goal: Verbalize thoughts and feelings  Description: Interventions:  - Assess and re-assess patient's level of risk   - Engage patient in 1:1 interactions, daily, for a minimum of 15 minutes   - Encourage patient to express feelings, fears, frustrations, hopes   Outcome: Progressing  Goal: Refrain from harming self  Description: Interventions:  - Monitor patient closely, per order   - Supervise medication ingestion, monitor effects and side effects   Outcome: Progressing  Goal: Refrain from isolation  Description: Interventions:  - Develop a trusting relationship   - Encourage socialization   Outcome: Progressing     Problem: Anxiety  Goal: Anxiety is at manageable level  Description: Interventions:  - Assess and monitor patient's anxiety level.   - Monitor for signs and symptoms (heart palpitations, chest pain, shortness of breath, headaches, nausea, feeling jumpy, restlessness, irritable, apprehensive).   - Collaborate with interdisciplinary team and initiate plan and interventions as ordered.  - [place] patient to unit/surroundings  - Explain treatment plan  - Encourage participation in care  - Encourage verbalization of concerns/fears  - Identify coping mechanisms  - Assist in developing anxiety-reducing skills  - Administer/offer alternative therapies  - Limit or eliminate stimulants  Outcome: Progressing     Problem: Knowledge Deficit  Goal: Patient/family/caregiver demonstrates understanding of disease process, treatment plan, medications, and discharge  instructions  Description: Complete learning assessment and assess knowledge base.  Interventions:  - Provide teaching at level of understanding  - Provide teaching via preferred learning methods  Outcome: Progressing     Problem: SLEEP DISTURBANCE  Goal: Will exhibit normal sleeping pattern  Description: Interventions:  -  Assess the patients sleep pattern, noting recent changes  - Administer medication as ordered  - Decrease environmental stimuli, including noise, as appropriate during the night  - Encourage the patient to actively participate in unit groups and or exercise during the day to enhance ability to achieve adequate sleep at night  - Assess the patient, in the morning, encouraging a description of sleep experience  Outcome: Progressing

## 2025-03-21 NOTE — PROGRESS NOTES
03/21/25 0837   Team Meeting   Meeting Type Daily Rounds   Team Members Present   Team Members Present Physician;Nurse;   Physician Team Member Ary   [facility]   Care Management Team Member Noa   Patient/Family Present   Patient Present No   Patient's Family Present No     Pt is medication and meal compliant. Pt denies SI/HI/AVH. Pt is calm and cooperative. Pt's discharge is pending personal care home.

## 2025-03-21 NOTE — NURSING NOTE
Mainly seclusive to his room. Pleasant during interaction. Cooperative. Denies symptoms. No complaints. Hopeful for discharge soon.

## 2025-03-21 NOTE — ASSESSMENT & PLAN NOTE
"  Patient continues to do well and the plan is to continue with the current treatment plan as mentioned below with no changes.    Patient continues to be pending group home placement. Otherwise no clinical significant change.   continues to look for group home/personal-care home placement however this can be significantly impacted by patient no longer having money in the [employer] account and his bank account being over drawn.  Patient is quite altruistic in his desire not to have his friend go to skilled nursing however continues to display poor to limited insight regarding his ability to advocate for himself and organize/prioritize his own needs given his own difficult social situation. Would be at significant risk of harm if not discharged to a structured setting with support.    Medication regimen as follows, no changes as of 3/20/2025:  Abilify 5 mg daily as mood adjunct   Zoloft 150 mg daily for depression and anxiety  Continue to encourage participation in group therapy, milieu therapy and occupational therapy.  Continue to assess for side effects of medications.  Continue collaboration with [facility] for medical co-morbidities as indicated.  Continue discussion with CM/SW to assist with obtaining collateral, disposition planning, and the implementation of patient-centered individualized plan of care.  Continue frequent safety checks and vitals per unit protocol.    Risks, benefits and possible side effects of Medications: Risks, benefits, and possible side effects of medications have previously been explained. No new medications at this time.      Legal status: 201    Disposition: to be determined, pending SOAR application for potential group home placement. OT Cognitive Evaluation completed: \"Pt would benefit from discharge to a supportive environment that can provide checks for safety and compliance with IADLs. \"   Although patient's mood has stabilized, they are currently awaiting group home placement.  Their " ability to recognize safety hazards take medications and participate in IADLs are significantly impaired by their cognitive deficits compounded by their chronic mental health needs and would be at risk for significant decompensation without the structure and support of a group home setting.      No associated orders from this encounter found during lookback period of 72 hours.

## 2025-03-22 PROCEDURE — 99233 SBSQ HOSP IP/OBS HIGH 50: CPT | Performed by: PSYCHIATRY & NEUROLOGY

## 2025-03-22 RX ADMIN — LEVOTHYROXINE SODIUM 37.5 MCG: 0.07 TABLET ORAL at 06:25

## 2025-03-22 RX ADMIN — SENNOSIDES AND DOCUSATE SODIUM 2 TABLET: 50; 8.6 TABLET ORAL at 17:19

## 2025-03-22 RX ADMIN — CYANOCOBALAMIN TAB 1000 MCG 1000 MCG: 1000 TAB at 08:32

## 2025-03-22 RX ADMIN — FAMOTIDINE 20 MG: 20 TABLET ORAL at 17:19

## 2025-03-22 RX ADMIN — FAMOTIDINE 20 MG: 20 TABLET ORAL at 08:33

## 2025-03-22 RX ADMIN — ATORVASTATIN CALCIUM 40 MG: 40 TABLET, FILM COATED ORAL at 17:18

## 2025-03-22 RX ADMIN — ARIPIPRAZOLE 5 MG: 5 TABLET ORAL at 08:33

## 2025-03-22 RX ADMIN — SERTRALINE HYDROCHLORIDE 150 MG: 100 TABLET ORAL at 08:33

## 2025-03-22 RX ADMIN — Medication 2500 UNITS: at 08:32

## 2025-03-22 NOTE — PLAN OF CARE
Problem: Ineffective Coping  Goal: Participates in unit activities  Description: Interventions:  - Provide therapeutic environment   - Provide required programming   - Redirect inappropriate behaviors   Outcome: Progressing     Problem: Depression  Goal: Treatment Goal: Demonstrate behavioral control of depressive symptoms, verbalize feelings of improved mood/affect, and adopt new coping skills prior to discharge  Outcome: Progressing  Goal: Verbalize thoughts and feelings  Description: Interventions:  - Assess and re-assess patient's level of risk   - Engage patient in 1:1 interactions, daily, for a minimum of 15 minutes   - Encourage patient to express feelings, fears, frustrations, hopes   Outcome: Progressing  Goal: Refrain from harming self  Description: Interventions:  - Monitor patient closely, per order   - Supervise medication ingestion, monitor effects and side effects   Outcome: Progressing  Goal: Refrain from isolation  Description: Interventions:  - Develop a trusting relationship   - Encourage socialization   Outcome: Progressing     Problem: Anxiety  Goal: Anxiety is at manageable level  Description: Interventions:  - Assess and monitor patient's anxiety level.   - Monitor for signs and symptoms (heart palpitations, chest pain, shortness of breath, headaches, nausea, feeling jumpy, restlessness, irritable, apprehensive).   - Collaborate with interdisciplinary team and initiate plan and interventions as ordered.  - [place] patient to unit/surroundings  - Explain treatment plan  - Encourage participation in care  - Encourage verbalization of concerns/fears  - Identify coping mechanisms  - Assist in developing anxiety-reducing skills  - Administer/offer alternative therapies  - Limit or eliminate stimulants  Outcome: Progressing     Problem: DISCHARGE PLANNING - CARE MANAGEMENT  Goal: Discharge to post-acute care or home with appropriate resources  Description: INTERVENTIONS:  - Conduct assessment to  determine patient/family and health care team treatment goals, and need for post-acute services based on payer coverage, community resources, and patient preferences, and barriers to discharge  - Address psychosocial, clinical, and financial barriers to discharge as identified in assessment in conjunction with the patient/family and health care team  - Arrange appropriate level of post-acute services according to patient’s   needs and preference and payer coverage in collaboration with the physician and health care team  - Communicate with and update the patient/family, physician, and health care team regarding progress on the discharge plan  - Arrange appropriate transportation to post-acute venues  Outcome: Progressing     Problem: Knowledge Deficit  Goal: Patient/family/caregiver demonstrates understanding of disease process, treatment plan, medications, and discharge instructions  Description: Complete learning assessment and assess knowledge base.  Interventions:  - Provide teaching at level of understanding  - Provide teaching via preferred learning methods  Outcome: Progressing     Problem: SLEEP DISTURBANCE  Goal: Will exhibit normal sleeping pattern  Description: Interventions:  -  Assess the patients sleep pattern, noting recent changes  - Administer medication as ordered  - Decrease environmental stimuli, including noise, as appropriate during the night  - Encourage the patient to actively participate in unit groups and or exercise during the day to enhance ability to achieve adequate sleep at night  - Assess the patient, in the morning, encouraging a description of sleep experience  Outcome: Progressing

## 2025-03-22 NOTE — NURSING NOTE
Patient secluded to room appears disheveled and withdrawn to self. Patient appears to be depressed although denies at this time. Denies SI/HI/AH/VH Compliant with medications and routine vitals.

## 2025-03-22 NOTE — NURSING NOTE
Patient denies SI/HI and A/V hallucinations.  He has been withdrawn to his room, resting.  He is periodically visible on the unit for needs and medications.  He is calm and cooperative.

## 2025-03-22 NOTE — PROGRESS NOTES
Progress Note - Behavioral Health   Name: Franco Roberson 39 y.o. male I MRN: [id number]  Unit/Bed#: -02 I Date of Admission: 5/14/2024   Date of Service: 3/22/2025 I Hospital Day: 312    Assessment & Plan  MDD (major depressive disorder), recurrent severe, without psychosis (HCC)    Patient continues to do well and the plan is to continue with the current treatment plan as mentioned below with no changes.    Patient continues to be pending group home placement. Otherwise no clinical significant change.   continues to look for group home/personal-care home placement however this can be significantly impacted by patient no longer having money in the [employer] account and his bank account being over drawn.  Patient is quite altruistic in his desire not to have his friend go to [facility] however continues to display poor to limited insight regarding his ability to advocate for himself and organize/prioritize his own needs given his own difficult social situation. Would be at significant risk of harm if not discharged to a structured setting with support.    Medication regimen as follows, no changes as of 3/20/2025:  Abilify 5 mg daily as mood adjunct   Zoloft 150 mg daily for depression and anxiety  Continue to encourage participation in group therapy, milieu therapy and occupational therapy.  Continue to assess for side effects of medications.  Continue collaboration with PATRICIA for medical co-morbidities as indicated.  Continue discussion with CM/SW to assist with obtaining collateral, disposition planning, and the implementation of patient-centered individualized plan of care.  Continue frequent safety checks and vitals per unit protocol.    Risks, benefits and possible side effects of Medications: Risks, benefits, and possible side effects of medications have previously been explained. No new medications at this time.    As of 3/22/2025: Patient overall has been doing well and denying any concerns.  Plan is to  "continue with the current psychiatric medications with no changes.  Will continue to monitor him for his mood, behavior, safety, sleep and response to medication while he is here in the unit.  Legal status: 201    Disposition: to be determined, pending SOAR application for potential group home placement. OT Cognitive Evaluation completed: \"Pt would benefit from discharge to a supportive environment that can provide checks for safety and compliance with IADLs. \"   Although patient's mood has stabilized, they are currently awaiting group home placement.  Their ability to recognize safety hazards take medications and participate in IADLs are significantly impaired by their cognitive deficits compounded by their chronic mental health needs and would be at risk for significant decompensation without the structure and support of a group home setting.      No associated orders from this encounter found during lookback period of 72 hours.  Autism spectrum disorder  Continue supportive care    No associated orders from this encounter found during lookback period of 72 hours.    Hyperlipidemia  - Lipitor 40 mg     Current medications:  Current Facility-Administered Medications   Medication Dose Route Frequency Provider Last Rate    acetaminophen  650 mg Oral Q6H PRN Basilio Brown MD      acetaminophen  650 mg Oral Q4H PRN Basilio Brown MD      acetaminophen  975 mg Oral Q6H PRN Basilio Brown MD      aluminum-magnesium hydroxide-simethicone  30 mL Oral Q4H PRN Basilio Brown MD      ARIPiprazole  5 mg Oral Daily Basilio Panda MD      Artificial Tears  1 drop Both Eyes Q3H PRN Basilio Brown MD      atorvastatin  40 mg Oral Daily With Dinner WALLY Baptiste      benztropine  1 mg Intramuscular Q4H PRN Max 6/day Basilio Brown MD      benztropine  1 mg Oral Q4H PRN Max 6/day Basilio Brown MD      Cholecalciferol  2,500 Units Oral Daily Dustin Mcallister MD      " cyanocobalamin  1,000 mcg Oral Daily Laura RomeWALLY Jacobson      Diclofenac Sodium  2 g Topical 4x Daily PRN Laura AmberWALLY Jacobson      hydrOXYzine HCL  50 mg Oral Q6H PRN Max 4/day Basilio Brown MD      Or    diphenhydrAMINE  50 mg Intramuscular Q6H PRN Basilio Brown MD      diphenhydrAMINE-zinc acetate   Topical Daily PRN Raj Guerrero MD      famotidine  20 mg Oral BID Laura Clark WALLY [place]      hydrOXYzine HCL  100 mg Oral Q6H PRN Max 4/day Basilio Brown MD      Or    LORazepam  2 mg Intramuscular Q6H PRN Basilio Brown MD      hydrOXYzine HCL  25 mg Oral Q6H PRN Max 4/day Basilio Brown MD      levothyroxine  37.5 mcg Oral Early Morning Laura RomeWALLY Jacobson      melatonin  6 mg Oral HS PRN WALLY Gomez      methocarbamol  500 mg Oral Q6H PRN WALLY Baptiste      OLANZapine  5 mg Oral Q4H PRN Max 3/day Basilio Brown MD      Or    OLANZapine  2.5 mg Intramuscular Q4H PRN Max 3/day Basilio Brown MD      OLANZapine  5 mg Oral Q3H PRN Max 3/day Basilio Brown MD      Or    OLANZapine  5 mg Intramuscular Q3H PRN Max 3/day Basilio Brown MD      OLANZapine  2.5 mg Oral Q4H PRN Max 6/day Basilio Brown MD      polyethylene glycol  17 g Oral Daily PRN Basilio Brown MD      propranolol  10 mg Oral Q8H PRN Basilio Brown MD      senna-docusate sodium  2 tablet Oral After Dinner Dustin Mcallister MD      sertraline  150 mg Oral Daily WALLY Gomez      traZODone  50 mg Oral HS PRN Basilio Brown MD          Risks/Benefits of Treatment:     Risks, benefits, and possible side effects of medications explained to patient and patient verbalizes understanding and agreement for treatment.    Progress Toward Goals: improving    Treatment Planning:     All current active medications have been reviewed.  Continue to monitor response to treatment and assess for potential side effects of  medications.  Encourage group therapy, milieu therapy and occupational therapy  Collaboration with medical service for medical comorbidities as indicated.  Behavioral Health checks for safety monitoring.  Estimated Discharge Day: 3/31/2025   Legal Status : Voluntary 201 commitment.    Subjective   The patient was seen in his room today.  The patient was lying in the bed but was awake.  He denies any concerns today.  Reports mood has been very good.  Denies any SI or HI.  Denies any anxiety.  Reports sleep and appetite has been good.  Denies any auditory visual hallucination.  Did not endorse any paranoia or delusional ideation during the meeting.  Reports compliant with medication and denies any side effect.    As per the nurse patient has been mostly secluded to his room and withdrawn to self.  Appears to be depressed though he has denied to the nurse to.  Denies any other mental health concerns to the nurse.  Compliant with medications.  Behavior over the last 24 hours: Stable        Objective :  Temp:  [96.6 °F (35.9 °C)-97.3 °F (36.3 °C)] 96.6 °F (35.9 °C)  HR:  [67-87] 67  BP: (118-127)/(71-75) 127/71  Resp:  [16-17] 16  SpO2:  [96 %-97 %] 97 %  O2 Device: None (Room air)    Temp:  [96.6 °F (35.9 °C)-97.3 °F (36.3 °C)] 96.6 °F (35.9 °C)  HR:  [67-87] 67  BP: (118-127)/(71-75) 127/71  Resp:  [16-17] 16  SpO2:  [96 %-97 %] 97 %  O2 Device: None (Room air)    Mental Status Evaluation:    Appearance:  disheveled   Behavior:  cooperative, calm   Speech:  normal rate, normal volume, normal pitch   Mood:  normal   Affect:  flat   Thought Process:  linear   Thought Content:  no overt delusions   Perceptual Disturbances: denies auditory or visual hallucinations when asked, but appears preoccupied   Risk Potential: Suicidal Ideation -  None  Homicidal Ideation -  None  Potential for Aggression - No   Sensorium:  oriented to person, place, and time/date   Memory:  recent and remote memory grossly intact   Consciousness:   alert and awake   Attention/Concentration: attention span and concentration are age appropriate   Insight:  poor   Judgment: poor   Gait/Station: in bed   Motor Activity: no abnormal movements       Lab Results: I have reviewed the following results:  Results from the past 24 hours: No results found for this or any previous visit (from the past 24 hours).    Administrative Statements     Counseling / Coordination of Care:   Patient's progress reviewed with nursing staff..    Aniyah Gallagher MD 03/22/25

## 2025-03-22 NOTE — NURSING NOTE
Pt has remained withdrawn to his room most of the day. Med and meal compliant. Denies SI/HI/AH/VH but does appear depressed. Denies any unmet needs or complaints.

## 2025-03-22 NOTE — ASSESSMENT & PLAN NOTE
Patient continues to do well and the plan is to continue with the current treatment plan as mentioned below with no changes.    Patient continues to be pending group home placement. Otherwise no clinical significant change.   continues to look for group home/personal-care home placement however this can be significantly impacted by patient no longer having money in the [employer] account and his bank account being over drawn.  Patient is quite altruistic in his desire not to have his friend go to prison however continues to display poor to limited insight regarding his ability to advocate for himself and organize/prioritize his own needs given his own difficult social situation. Would be at significant risk of harm if not discharged to a structured setting with support.    Medication regimen as follows, no changes as of 3/20/2025:  Abilify 5 mg daily as mood adjunct   Zoloft 150 mg daily for depression and anxiety  Continue to encourage participation in group therapy, milieu therapy and occupational therapy.  Continue to assess for side effects of medications.  Continue collaboration with PATRICIA for medical co-morbidities as indicated.  Continue discussion with CM/SW to assist with obtaining collateral, disposition planning, and the implementation of patient-centered individualized plan of care.  Continue frequent safety checks and vitals per unit protocol.    Risks, benefits and possible side effects of Medications: Risks, benefits, and possible side effects of medications have previously been explained. No new medications at this time.    As of 3/22/2025: Patient overall has been doing well and denying any concerns.  Plan is to continue with the current psychiatric medications with no changes.  Will continue to monitor him for his mood, behavior, safety, sleep and response to medication while he is here in the unit.  Legal status: 201    Disposition: to be determined, pending SOAR application for potential  "group home placement. OT Cognitive Evaluation completed: \"Pt would benefit from discharge to a supportive environment that can provide checks for safety and compliance with IADLs. \"   Although patient's mood has stabilized, they are currently awaiting group home placement.  Their ability to recognize safety hazards take medications and participate in IADLs are significantly impaired by their cognitive deficits compounded by their chronic mental health needs and would be at risk for significant decompensation without the structure and support of a group home setting.      No associated orders from this encounter found during lookback period of 72 hours.  "

## 2025-03-23 PROCEDURE — 99233 SBSQ HOSP IP/OBS HIGH 50: CPT | Performed by: PSYCHIATRY & NEUROLOGY

## 2025-03-23 RX ADMIN — FAMOTIDINE 20 MG: 20 TABLET ORAL at 08:21

## 2025-03-23 RX ADMIN — CYANOCOBALAMIN TAB 1000 MCG 1000 MCG: 1000 TAB at 08:21

## 2025-03-23 RX ADMIN — ATORVASTATIN CALCIUM 40 MG: 40 TABLET, FILM COATED ORAL at 17:10

## 2025-03-23 RX ADMIN — SERTRALINE HYDROCHLORIDE 150 MG: 100 TABLET ORAL at 08:21

## 2025-03-23 RX ADMIN — SENNOSIDES AND DOCUSATE SODIUM 2 TABLET: 50; 8.6 TABLET ORAL at 17:11

## 2025-03-23 RX ADMIN — LEVOTHYROXINE SODIUM 37.5 MCG: 0.07 TABLET ORAL at 06:31

## 2025-03-23 RX ADMIN — ARIPIPRAZOLE 5 MG: 5 TABLET ORAL at 08:24

## 2025-03-23 RX ADMIN — FAMOTIDINE 20 MG: 20 TABLET ORAL at 17:11

## 2025-03-23 RX ADMIN — Medication 2500 UNITS: at 08:21

## 2025-03-23 NOTE — PLAN OF CARE
Problem: Ineffective Coping  Goal: Participates in unit activities  Description: Interventions:  - Provide therapeutic environment   - Provide required programming   - Redirect inappropriate behaviors   Outcome: Progressing  Note: Minimally     Problem: Depression  Goal: Treatment Goal: Demonstrate behavioral control of depressive symptoms, verbalize feelings of improved mood/affect, and adopt new coping skills prior to discharge  Outcome: Progressing  Goal: Verbalize thoughts and feelings  Description: Interventions:  - Assess and re-assess patient's level of risk   - Engage patient in 1:1 interactions, daily, for a minimum of 15 minutes   - Encourage patient to express feelings, fears, frustrations, hopes   Outcome: Progressing  Goal: Refrain from harming self  Description: Interventions:  - Monitor patient closely, per order   - Supervise medication ingestion, monitor effects and side effects   Outcome: Progressing  Goal: Refrain from isolation  Description: Interventions:  - Develop a trusting relationship   - Encourage socialization   Outcome: Progressing     Problem: Anxiety  Goal: Anxiety is at manageable level  Description: Interventions:  - Assess and monitor patient's anxiety level.   - Monitor for signs and symptoms (heart palpitations, chest pain, shortness of breath, headaches, nausea, feeling jumpy, restlessness, irritable, apprehensive).   - Collaborate with interdisciplinary team and initiate plan and interventions as ordered.  - Anderson patient to unit/surroundings  - Explain treatment plan  - Encourage participation in care  - Encourage verbalization of concerns/fears  - Identify coping mechanisms  - Assist in developing anxiety-reducing skills  - Administer/offer alternative therapies  - Limit or eliminate stimulants  Outcome: Progressing     Problem: DISCHARGE PLANNING - CARE MANAGEMENT  Goal: Discharge to post-acute care or home with appropriate resources  Description: INTERVENTIONS:  - Conduct  assessment to determine patient/family and health care team treatment goals, and need for post-acute services based on payer coverage, community resources, and patient preferences, and barriers to discharge  - Address psychosocial, clinical, and financial barriers to discharge as identified in assessment in conjunction with the patient/family and health care team  - Arrange appropriate level of post-acute services according to patient’s   needs and preference and payer coverage in collaboration with the physician and health care team  - Communicate with and update the patient/family, physician, and health care team regarding progress on the discharge plan  - Arrange appropriate transportation to post-acute venues  Outcome: Progressing     Problem: Knowledge Deficit  Goal: Patient/family/caregiver demonstrates understanding of disease process, treatment plan, medications, and discharge instructions  Description: Complete learning assessment and assess knowledge base.  Interventions:  - Provide teaching at level of understanding  - Provide teaching via preferred learning methods  Outcome: Progressing     Problem: SLEEP DISTURBANCE  Goal: Will exhibit normal sleeping pattern  Description: Interventions:  -  Assess the patients sleep pattern, noting recent changes  - Administer medication as ordered  - Decrease environmental stimuli, including noise, as appropriate during the night  - Encourage the patient to actively participate in unit groups and or exercise during the day to enhance ability to achieve adequate sleep at night  - Assess the patient, in the morning, encouraging a description of sleep experience  Outcome: Progressing

## 2025-03-23 NOTE — NURSING NOTE
Pt calm and cooperative. Withdrawn to room, comes out for meals. Denies SI/HI/AH/VH. Appears depressed and flat in conversation but denies. Medication and meal compliant. No current unmet needs.

## 2025-03-23 NOTE — ASSESSMENT & PLAN NOTE
Patient continues to do well and the plan is to continue with the current treatment plan as mentioned below with no changes.    Patient continues to be pending group home placement. Otherwise no clinical significant change.   continues to look for group home/personal-care home placement however this can be significantly impacted by patient no longer having money in the [employer] account and his bank account being over drawn.  Patient is quite altruistic in his desire not to have his friend go to care home however continues to display poor to limited insight regarding his ability to advocate for himself and organize/prioritize his own needs given his own difficult social situation. Would be at significant risk of harm if not discharged to a structured setting with support.    Medication regimen as follows, no changes as of 3/20/2025:  Abilify 5 mg daily as mood adjunct   Zoloft 150 mg daily for depression and anxiety  Continue to encourage participation in group therapy, milieu therapy and occupational therapy.  Continue to assess for side effects of medications.  Continue collaboration with PATRICIA for medical co-morbidities as indicated.  Continue discussion with CM/SW to assist with obtaining collateral, disposition planning, and the implementation of patient-centered individualized plan of care.  Continue frequent safety checks and vitals per unit protocol.    Risks, benefits and possible side effects of Medications: Risks, benefits, and possible side effects of medications have previously been explained. No new medications at this time.    As of 3/23/2025: Patient overall has been doing well except for poor hygiene today.  Denying any concerns.  Plan is to continue with the current psychiatric medications with no changes.  Will continue to monitor him for his mood, behavior, safety, sleep and response to medication while he is here in the unit.  Legal status: 201    Disposition: to be determined, pending SOAR  "application for potential group home placement. OT Cognitive Evaluation completed: \"Pt would benefit from discharge to a supportive environment that can provide checks for safety and compliance with IADLs. \"   Although patient's mood has stabilized, they are currently awaiting group home placement.  Their ability to recognize safety hazards take medications and participate in IADLs are significantly impaired by their cognitive deficits compounded by their chronic mental health needs and would be at risk for significant decompensation without the structure and support of a group home setting.      No associated orders from this encounter found during lookback period of 72 hours.  "

## 2025-03-23 NOTE — PROGRESS NOTES
Progress Note - Behavioral Health   Name: Franco Roberson 39 y.o. male I MRN: [id number]  Unit/Bed#: -02 I Date of Admission: 5/14/2024   Date of Service: 3/23/2025 I Hospital Day: 313    Assessment & Plan  MDD (major depressive disorder), recurrent severe, without psychosis (HCC)    Patient continues to do well and the plan is to continue with the current treatment plan as mentioned below with no changes.    Patient continues to be pending group home placement. Otherwise no clinical significant change.   continues to look for group home/personal-care home placement however this can be significantly impacted by patient no longer having money in the [employer] account and his bank account being over drawn.  Patient is quite altruistic in his desire not to have his friend go to [facility] however continues to display poor to limited insight regarding his ability to advocate for himself and organize/prioritize his own needs given his own difficult social situation. Would be at significant risk of harm if not discharged to a structured setting with support.    Medication regimen as follows, no changes as of 3/20/2025:  Abilify 5 mg daily as mood adjunct   Zoloft 150 mg daily for depression and anxiety  Continue to encourage participation in group therapy, milieu therapy and occupational therapy.  Continue to assess for side effects of medications.  Continue collaboration with PATRICIA for medical co-morbidities as indicated.  Continue discussion with CM/SW to assist with obtaining collateral, disposition planning, and the implementation of patient-centered individualized plan of care.  Continue frequent safety checks and vitals per unit protocol.    Risks, benefits and possible side effects of Medications: Risks, benefits, and possible side effects of medications have previously been explained. No new medications at this time.    As of 3/23/2025: Patient overall has been doing well except for poor hygiene today.  Denying  "any concerns.  Plan is to continue with the current psychiatric medications with no changes.  Will continue to monitor him for his mood, behavior, safety, sleep and response to medication while he is here in the unit.  Legal status: 201    Disposition: to be determined, pending SOAR application for potential group home placement. OT Cognitive Evaluation completed: \"Pt would benefit from discharge to a supportive environment that can provide checks for safety and compliance with IADLs. \"   Although patient's mood has stabilized, they are currently awaiting group home placement.  Their ability to recognize safety hazards take medications and participate in IADLs are significantly impaired by their cognitive deficits compounded by their chronic mental health needs and would be at risk for significant decompensation without the structure and support of a group home setting.      No associated orders from this encounter found during lookback period of 72 hours.  Autism spectrum disorder  Continue supportive care    No associated orders from this encounter found during lookback period of 72 hours.    Hyperlipidemia  - Lipitor 40 mg     Current medications:  Current Facility-Administered Medications   Medication Dose Route Frequency Provider Last Rate    acetaminophen  650 mg Oral Q6H PRN Basilio Brown MD      acetaminophen  650 mg Oral Q4H PRN Basilio Brown MD      acetaminophen  975 mg Oral Q6H PRN Basilio Brown MD      aluminum-magnesium hydroxide-simethicone  30 mL Oral Q4H PRN Basilio Brown MD      ARIPiprazole  5 mg Oral Daily Basilio Panda MD      Artificial Tears  1 drop Both Eyes Q3H PRN Basilio Brown MD      atorvastatin  40 mg Oral Daily With Dinner WALLY Baptiste      benztropine  1 mg Intramuscular Q4H PRN Max 6/day Basilio Brown MD      benztropine  1 mg Oral Q4H PRN Max 6/day Basilio Brown MD      Cholecalciferol  2,500 Units Oral Daily Dustin" MD Ary      cyanocobalamin  1,000 mcg Oral Daily LauraWALLY Tony      Diclofenac Sodium  2 g Topical 4x Daily PRN LauraWALLY Tony      hydrOXYzine HCL  50 mg Oral Q6H PRN Max 4/day Basilio Brown MD      Or    diphenhydrAMINE  50 mg Intramuscular Q6H PRN Basilio Brown MD      diphenhydrAMINE-zinc acetate   Topical Daily PRN Raj Guerrero MD      famotidine  20 mg Oral BID Laura Romegrady [place], WALLY      hydrOXYzine HCL  100 mg Oral Q6H PRN Max 4/day Basilio Brown MD      Or    LORazepam  2 mg Intramuscular Q6H PRN Basilio Brown MD      hydrOXYzine HCL  25 mg Oral Q6H PRN Max 4/day Basilio Brown MD      levothyroxine  37.5 mcg Oral Early Morning Laura RomeWALLY Jacobson      melatonin  6 mg Oral HS PRN WALLY Gomez      methocarbamol  500 mg Oral Q6H PRN WALLY Baptiste      OLANZapine  5 mg Oral Q4H PRN Max 3/day Basilio Brown MD      Or    OLANZapine  2.5 mg Intramuscular Q4H PRN Max 3/day Basilio Brown MD      OLANZapine  5 mg Oral Q3H PRN Max 3/day Basilio Brown MD      Or    OLANZapine  5 mg Intramuscular Q3H PRN Max 3/day Basilio Brown MD      OLANZapine  2.5 mg Oral Q4H PRN Max 6/day Basilio Brown MD      polyethylene glycol  17 g Oral Daily PRN Basilio Brown MD      propranolol  10 mg Oral Q8H PRN Basilio Brown MD      senna-docusate sodium  2 tablet Oral After Dinner Dustin Mcallister MD      sertraline  150 mg Oral Daily WALLY Gomez      traZODone  50 mg Oral HS PRN Basilio Brown MD          Risks/Benefits of Treatment:     Risks, benefits, and possible side effects of medications explained to patient and patient verbalizes understanding and agreement for treatment.    Progress Toward Goals: improving    Treatment Planning:     All current active medications have been reviewed.  Continue to monitor response to treatment and assess for potential side effects  of medications.  Encourage group therapy, milieu therapy and occupational therapy  Collaboration with medical service for medical comorbidities as indicated.  Behavioral Health checks for safety monitoring.  Estimated Discharge Day: 3/31/2025   Legal Status : Voluntary 201 commitment.    Subjective   The patient was seen in his room today.  The patient was lying in the bed but was awake.  He denies any concerns today.  Reports mood has been very good.  Denies any SI or HI.  Denies any anxiety.  Reports sleep and appetite has been good.  Denies any auditory visual hallucination.  Did not endorse any paranoia or delusional ideation during the meeting.  Reports compliant with medication and denies any side effect.    As per the nurse patient has been mostly secluded to his room and withdrawn to self.  Appears to be depressed though he has denied to the nurse.  Denies any other mental health concerns to the nurse.  Compliant with medications.  Behavior over the last 24 hours: Stable        Objective :  Temp:  [97.3 °F (36.3 °C)-98.2 °F (36.8 °C)] 97.3 °F (36.3 °C)  HR:  [60-95] 60  BP: (101-126)/(66-70) 101/66  Resp:  [16-17] 16  SpO2:  [96 %] 96 %  O2 Device: None (Room air)    Temp:  [97.3 °F (36.3 °C)-98.2 °F (36.8 °C)] 97.3 °F (36.3 °C)  HR:  [60-95] 60  BP: (101-126)/(66-70) 101/66  Resp:  [16-17] 16  SpO2:  [96 %] 96 %  O2 Device: None (Room air)    Mental Status Evaluation:    Appearance:  disheveled, poor hygiene   Behavior:  cooperative, calm   Speech:  normal rate, normal volume, normal pitch   Mood:  normal   Affect:  flat   Thought Process:  linear   Thought Content:  no overt delusions   Perceptual Disturbances: denies auditory or visual hallucinations when asked, but appears preoccupied   Risk Potential: Suicidal Ideation -  None  Homicidal Ideation -  None  Potential for Aggression - No   Sensorium:  oriented to person, place, and time/date   Memory:  recent and remote memory grossly intact    Consciousness:  alert and awake   Attention/Concentration: attention span and concentration are age appropriate   Insight:  poor   Judgment: poor   Gait/Station: in bed   Motor Activity: no abnormal movements       Lab Results: I have reviewed the following results:  Results from the past 24 hours: No results found for this or any previous visit (from the past 24 hours).    Administrative Statements     Counseling / Coordination of Care:   Patient's progress reviewed with nursing staff..    Aniyah Gallagher MD 03/23/25

## 2025-03-24 PROCEDURE — 99232 SBSQ HOSP IP/OBS MODERATE 35: CPT | Performed by: PSYCHIATRY & NEUROLOGY

## 2025-03-24 RX ADMIN — FAMOTIDINE 20 MG: 20 TABLET ORAL at 08:19

## 2025-03-24 RX ADMIN — LEVOTHYROXINE SODIUM 37.5 MCG: 0.07 TABLET ORAL at 06:25

## 2025-03-24 RX ADMIN — SENNOSIDES AND DOCUSATE SODIUM 2 TABLET: 50; 8.6 TABLET ORAL at 17:03

## 2025-03-24 RX ADMIN — SERTRALINE HYDROCHLORIDE 150 MG: 100 TABLET ORAL at 08:19

## 2025-03-24 RX ADMIN — ARIPIPRAZOLE 5 MG: 5 TABLET ORAL at 08:19

## 2025-03-24 RX ADMIN — Medication 2500 UNITS: at 08:19

## 2025-03-24 RX ADMIN — ATORVASTATIN CALCIUM 40 MG: 40 TABLET, FILM COATED ORAL at 17:03

## 2025-03-24 RX ADMIN — CYANOCOBALAMIN TAB 1000 MCG 1000 MCG: 1000 TAB at 08:19

## 2025-03-24 RX ADMIN — FAMOTIDINE 20 MG: 20 TABLET ORAL at 17:03

## 2025-03-24 NOTE — ASSESSMENT & PLAN NOTE
"  Patient continues to do well and the plan is to continue with the current treatment plan as mentioned below with no changes.    Patient continues to be pending group home placement. Otherwise no clinical significant change.   continues to look for group home/personal-care home placement however this can be significantly impacted by patient no longer having money in the [employer] account and his bank account being over drawn.  Patient is quite altruistic in his desire not to have his friend go to [facility] however continues to display poor to limited insight regarding his ability to advocate for himself and organize/prioritize his own needs given his own difficult social situation. Would be at significant risk of harm if not discharged to a structured setting with support.    Medication regimen as follows, no changes as of 3/24/2025:  Abilify 5 mg daily as mood adjunct   Zoloft 150 mg daily for depression and anxiety  Continue to encourage participation in group therapy, milieu therapy and occupational therapy.  Continue to assess for side effects of medications.  Continue collaboration with [facility] for medical co-morbidities as indicated.  Continue discussion with CM/SW to assist with obtaining collateral, disposition planning, and the implementation of patient-centered individualized plan of care.  Continue frequent safety checks and vitals per unit protocol.    Risks, benefits and possible side effects of Medications: Risks, benefits, and possible side effects of medications have previously been explained. No new medications at this time.      Legal status: 201    Disposition: to be determined, pending SOAR application for potential group home placement. OT Cognitive Evaluation completed: \"Pt would benefit from discharge to a supportive environment that can provide checks for safety and compliance with IADLs. \"   Although patient's mood has stabilized, they are currently awaiting group home placement.  Their " ability to recognize safety hazards take medications and participate in IADLs are significantly impaired by their cognitive deficits compounded by their chronic mental health needs and would be at risk for significant decompensation without the structure and support of a group home setting.      No associated orders from this encounter found during lookback period of 72 hours.

## 2025-03-24 NOTE — PROGRESS NOTES
Progress Note - Behavioral Health   Name: Franco Roberson 39 y.o. male I MRN: [id number]  Unit/Bed#: -02 I Date of Admission: 5/14/2024   Date of Service: 3/24/2025 I Hospital Day: 314     Assessment & Plan  MDD (major depressive disorder), recurrent severe, without psychosis (HCC)    Patient continues to do well and the plan is to continue with the current treatment plan as mentioned below with no changes.    Patient continues to be pending group home placement. Otherwise no clinical significant change.   continues to look for group home/personal-care home placement however this can be significantly impacted by patient no longer having money in the [employer] account and his bank account being over drawn.  Patient is quite altruistic in his desire not to have his friend go to shelter however continues to display poor to limited insight regarding his ability to advocate for himself and organize/prioritize his own needs given his own difficult social situation. Would be at significant risk of harm if not discharged to a structured setting with support.    Medication regimen as follows, no changes as of 3/24/2025:  Abilify 5 mg daily as mood adjunct   Zoloft 150 mg daily for depression and anxiety  Continue to encourage participation in group therapy, milieu therapy and occupational therapy.  Continue to assess for side effects of medications.  Continue collaboration with PATRICIA for medical co-morbidities as indicated.  Continue discussion with CM/SW to assist with obtaining collateral, disposition planning, and the implementation of patient-centered individualized plan of care.  Continue frequent safety checks and vitals per unit protocol.    Risks, benefits and possible side effects of Medications: Risks, benefits, and possible side effects of medications have previously been explained. No new medications at this time.      Legal status: 201    Disposition: to be determined, pending SOAR application for  "potential group home placement. OT Cognitive Evaluation completed: \"Pt would benefit from discharge to a supportive environment that can provide checks for safety and compliance with IADLs. \"   Although patient's mood has stabilized, they are currently awaiting group home placement.  Their ability to recognize safety hazards take medications and participate in IADLs are significantly impaired by their cognitive deficits compounded by their chronic mental health needs and would be at risk for significant decompensation without the structure and support of a group home setting.      No associated orders from this encounter found during lookback period of 72 hours.  Autism spectrum disorder  Continue supportive care    No associated orders from this encounter found during lookback period of 72 hours.    Hyperlipidemia  - Lipitor 40 mg       Plan     Recommended Treatment:    - Encourage early mobility and having a structured day  - Provide frequent re-orientation, and cognitive stimulation  - Ensure assistive devices are in proper working order (eye-glasses, hearing aids)  - Encourage adequate hydration, nutrition and monitor bowel movements  - Maintain sleep-wake cycle: Uninterrupted sleep time; low-level lighting at night  - Fall precaution  - f/u SLIM recs regarding the medical problems   - Continue medication titration and treatment plan; adjust medication to optimize treatment response and as clinically indicated. .   - Observation: routine            - VS: as per unit protocol  - Diet: Regular diet  - Encourage group attendance and milieu therapy  - Dispo: To be determined     Scheduled medications:  Current Facility-Administered Medications   Medication Dose Route Frequency Provider Last Rate    acetaminophen  650 mg Oral Q6H PRN Basilio Brown MD      acetaminophen  650 mg Oral Q4H PRN Basilio Brown MD      acetaminophen  975 mg Oral Q6H PRN Basilio Brown MD      aluminum-magnesium " hydroxide-simethicone  30 mL Oral Q4H PRN Basilio Brown MD      ARIPiprazole  5 mg Oral Daily Basilio Panda MD      Artificial Tears  1 drop Both Eyes Q3H PRN Basilio Brown MD      atorvastatin  40 mg Oral Daily With Dinner WALLY Baptiste      benztropine  1 mg Intramuscular Q4H PRN Max 6/day Basilio Brown MD      benztropine  1 mg Oral Q4H PRN Max 6/day Basilio Brown MD      Cholecalciferol  2,500 Units Oral Daily Dustin Mcallister MD      cyanocobalamin  1,000 mcg Oral Daily WALLY Baptiste      Diclofenac Sodium  2 g Topical 4x Daily PRN WALLY Baptiste      hydrOXYzine HCL  50 mg Oral Q6H PRN Max 4/day Basilio Brown MD      Or    diphenhydrAMINE  50 mg Intramuscular Q6H PRN Basilio Brown MD      diphenhydrAMINE-zinc acetate   Topical Daily PRN Raj Guerrero MD      famotidine  20 mg Oral BID WALLY Baptiste      hydrOXYzine HCL  100 mg Oral Q6H PRN Max 4/day Basilio Brown MD      Or    LORazepam  2 mg Intramuscular Q6H PRN Basilio Brown MD      hydrOXYzine HCL  25 mg Oral Q6H PRN Max 4/day Basilio Brown MD      levothyroxine  37.5 mcg Oral Early Morning WALLY Baptiste      melatonin  6 mg Oral HS PRN WALLY Gomez      methocarbamol  500 mg Oral Q6H PRN WALLY Baptiste      OLANZapine  5 mg Oral Q4H PRN Max 3/day Basilio Brown MD      Or    OLANZapine  2.5 mg Intramuscular Q4H PRN Max 3/day Basilio Brown MD      OLANZapine  5 mg Oral Q3H PRN Max 3/day Basilio Brown MD      Or    OLANZapine  5 mg Intramuscular Q3H PRN Max 3/day Basilio Brown MD      OLANZapine  2.5 mg Oral Q4H PRN Max 6/day Basilio Brown MD      polyethylene glycol  17 g Oral Daily PRN Basilio Brown MD      propranolol  10 mg Oral Q8H PRN Basilio Brown MD      senna-docusate sodium  2 tablet Oral After Dinner Dustin Mcallister MD      sertraline  150 mg Oral  "Daily WALLY Gomez      traZODone  50 mg Oral HS PRN Basilio Brown MD        PRN:    acetaminophen    acetaminophen    acetaminophen    aluminum-magnesium hydroxide-simethicone    Artificial Tears    benztropine    benztropine    Diclofenac Sodium    hydrOXYzine HCL **OR** diphenhydrAMINE    diphenhydrAMINE-zinc acetate    hydrOXYzine HCL **OR** LORazepam    hydrOXYzine HCL    melatonin    methocarbamol    OLANZapine **OR** OLANZapine    OLANZapine **OR** OLANZapine    OLANZapine    polyethylene glycol    propranolol    traZODone       Subjective     Patient was visited on unit for continuing care; chart reviewed and discussed with multidisciplinary treatment team.  On approach, the patient was calm and cooperative. Denied any changes in mood, appetite, and energy level. No problem initiating and maintaining sleep.  Denied A/VH currently.  Denied SI/HI, intent or plan upon direct inquiry at this time. SW waiting to hear back from [facility] regarding bed availability. Otherwise, informed that pt is good candidate for placement.     Patient continues to be selectively interactive with staff and peers. No reports of aggression or self-injurious behavior on unit. No PRN medications used in the past 24 hours.    Patient accepted all offered medications and no adverse effects of medications noted or reported.    Objective    Current Mental Status Evaluation:  Mental Status Exam  Appearance: casually dressed, consistent with stated age  Motor: no psychomotor retardation, no gait abnormalities  Behavior: cooperative, answers questions appropriately  Speech: soft, normal rhythm  Mood: \"good\"  Affect: blunted  Thought Process: concrete, goal directed  Thought Content: denies delusions  Risk Potential: denies suicidal ideation, plan, or intent. Denies homicidal ideation  Perceptions: denies auditory hallucinations, denies visual hallucinations,   Sensorium: Oriented to person, place, time, and " situation  Cognition: cognitive ability appears intact but was not quantitatively tested  Consciousness: alert and awake  Attention: intact, able to focus without difficulty  Insight: limited  Judgement: limited            Vital signs in last 24 hours:    Temp:  [97.5 °F (36.4 °C)-98.1 °F (36.7 °C)] 98.1 °F (36.7 °C)  HR:  [68-71] 71  BP: (101-123)/(60-70) 123/70  Resp:  [16] 16  SpO2:  [97 %-98 %] 98 %  O2 Device: None (Room air)    Psychiatric Review of Systems  Medication Adverse Effects: see HPI  Behaviors: see HPI  Sleep: unchanged  Appetite: unchanged    Laboratory results:    I have personally reviewed all pertinent laboratory/tests results  No results found for this or any previous visit (from the past 48 hours).       Progress Toward Goals & Illness Status:   participates in milieu therapy, mood is stabilizing, placement pending    Patient is not at goal. They are not yet ready for discharge. The patient's condition currently requires active psychopharmacological medication management, interdisciplinary coordination with case management, and the utilization of adjunctive milieu and group therapy to augment psychopharmacological efficacy. The patient's risk of morbidity, and progression or decompensation of psychiatric disease, is higher without this current treatment.     Next of Kin  Extended Emergency Contact Information  Primary Emergency Contact: Lois Roberson  Address: [address] [place] [address] of [facility]  Home Phone: [phone]  Relation: Mother    Counseling / Coordination of Care  Patient's progress discussed with staff in treatment team meeting.  Medications, treatment progress and treatment plan reviewed with patient.  Medication education provided to patient.  Educated on importance of medication and treatment compliance.  Supportive therapy provided to patient.  Cognitive techniques utilized during the session.  Reassurance and supportive  therapy provided.  Reoriented to reality and reassured.  Encouraged participation in milieu and group therapy on the unit.  Crisis/safety plan discussed with patient.       Dustin Mcallister MD  Attending Psychiatrist   [facility]      This note has been constructed using a voice recognition system. There may be translation, syntax, or grammatical errors. If you have any questions, please contact the dictating provider.

## 2025-03-24 NOTE — PLAN OF CARE
Problem: Ineffective Coping  Goal: Participates in unit activities  Description: Interventions:  - Provide therapeutic environment   - Provide required programming   - Redirect inappropriate behaviors   Outcome: Progressing     Problem: Depression  Goal: Treatment Goal: Demonstrate behavioral control of depressive symptoms, verbalize feelings of improved mood/affect, and adopt new coping skills prior to discharge  Outcome: Progressing  Goal: Verbalize thoughts and feelings  Description: Interventions:  - Assess and re-assess patient's level of risk   - Engage patient in 1:1 interactions, daily, for a minimum of 15 minutes   - Encourage patient to express feelings, fears, frustrations, hopes   Outcome: Progressing  Goal: Refrain from harming self  Description: Interventions:  - Monitor patient closely, per order   - Supervise medication ingestion, monitor effects and side effects   Outcome: Progressing  Goal: Refrain from isolation  Description: Interventions:  - Develop a trusting relationship   - Encourage socialization   Outcome: Progressing     Problem: Anxiety  Goal: Anxiety is at manageable level  Description: Interventions:  - Assess and monitor patient's anxiety level.   - Monitor for signs and symptoms (heart palpitations, chest pain, shortness of breath, headaches, nausea, feeling jumpy, restlessness, irritable, apprehensive).   - Collaborate with interdisciplinary team and initiate plan and interventions as ordered.  - [place] patient to unit/surroundings  - Explain treatment plan  - Encourage participation in care  - Encourage verbalization of concerns/fears  - Identify coping mechanisms  - Assist in developing anxiety-reducing skills  - Administer/offer alternative therapies  - Limit or eliminate stimulants  Outcome: Progressing     Problem: DISCHARGE PLANNING - CARE MANAGEMENT  Goal: Discharge to post-acute care or home with appropriate resources  Description: INTERVENTIONS:  - Conduct assessment to  determine patient/family and health care team treatment goals, and need for post-acute services based on payer coverage, community resources, and patient preferences, and barriers to discharge  - Address psychosocial, clinical, and financial barriers to discharge as identified in assessment in conjunction with the patient/family and health care team  - Arrange appropriate level of post-acute services according to patient’s   needs and preference and payer coverage in collaboration with the physician and health care team  - Communicate with and update the patient/family, physician, and health care team regarding progress on the discharge plan  - Arrange appropriate transportation to post-acute venues  Outcome: Progressing     Problem: Knowledge Deficit  Goal: Patient/family/caregiver demonstrates understanding of disease process, treatment plan, medications, and discharge instructions  Description: Complete learning assessment and assess knowledge base.  Interventions:  - Provide teaching at level of understanding  - Provide teaching via preferred learning methods  Outcome: Progressing     Problem: SLEEP DISTURBANCE  Goal: Will exhibit normal sleeping pattern  Description: Interventions:  -  Assess the patients sleep pattern, noting recent changes  - Administer medication as ordered  - Decrease environmental stimuli, including noise, as appropriate during the night  - Encourage the patient to actively participate in unit groups and or exercise during the day to enhance ability to achieve adequate sleep at night  - Assess the patient, in the morning, encouraging a description of sleep experience  Outcome: Progressing

## 2025-03-24 NOTE — NURSING NOTE
Pleasant upon approach. Quiet and to himself. Hopeful for placement soon. Denies symptoms. Cooperative. Compliant.

## 2025-03-24 NOTE — PLAN OF CARE
Pt intermittently attends groups throughout the week. Pt more quiet, but participates appropriately when in attendance.

## 2025-03-24 NOTE — PROGRESS NOTES
03/24/25 0837   Team Meeting   Meeting Type Daily Rounds   Team Members Present   Team Members Present Physician;Nurse;   Physician Team Member Ayr   [facility]   Care Management Team Member Noa   Patient/Family Present   Patient Present No   Patient's Family Present No     Pt is medication and meal compliant. Pt denies SI/HI/AVH. Pt is calm and compliant. Pt is pleasant. Pt's discharge is pending placement in personal care home.

## 2025-03-24 NOTE — NURSING NOTE
Patient visible on the unit intermittently,appears to be depressed and withdrawn. Patient encouraged to partake in group and activities with no success.  Patient denies SI/HI/AH/VH at this time. Compliant with medications and routine vitals.

## 2025-03-25 PROCEDURE — 99232 SBSQ HOSP IP/OBS MODERATE 35: CPT | Performed by: PSYCHIATRY & NEUROLOGY

## 2025-03-25 RX ADMIN — ARIPIPRAZOLE 5 MG: 5 TABLET ORAL at 08:13

## 2025-03-25 RX ADMIN — ATORVASTATIN CALCIUM 40 MG: 40 TABLET, FILM COATED ORAL at 17:19

## 2025-03-25 RX ADMIN — Medication 2500 UNITS: at 08:13

## 2025-03-25 RX ADMIN — LEVOTHYROXINE SODIUM 37.5 MCG: 0.07 TABLET ORAL at 06:14

## 2025-03-25 RX ADMIN — SENNOSIDES AND DOCUSATE SODIUM 2 TABLET: 50; 8.6 TABLET ORAL at 17:19

## 2025-03-25 RX ADMIN — SERTRALINE HYDROCHLORIDE 150 MG: 100 TABLET ORAL at 08:13

## 2025-03-25 RX ADMIN — CYANOCOBALAMIN TAB 1000 MCG 1000 MCG: 1000 TAB at 08:13

## 2025-03-25 RX ADMIN — FAMOTIDINE 20 MG: 20 TABLET ORAL at 17:20

## 2025-03-25 RX ADMIN — FAMOTIDINE 20 MG: 20 TABLET ORAL at 08:13

## 2025-03-25 NOTE — PROGRESS NOTES
Progress Note - Behavioral Health   Name: Franco Roberson 39 y.o. male I MRN: [id number]  Unit/Bed#: -02 I Date of Admission: 5/14/2024   Date of Service: 3/25/2025 I Hospital Day: 315     Assessment & Plan  MDD (major depressive disorder), recurrent severe, without psychosis (HCC)    Patient continues to do well and the plan is to continue with the current treatment plan as mentioned below with no changes.    Patient continues to be pending group home placement. Otherwise no clinical significant change.   continues to look for group home/personal-care home placement however this can be significantly impacted by patient no longer having money in the [employer] account and his bank account being over drawn.  Patient is quite altruistic in his desire not to have his friend go to [facility] however continues to display poor to limited insight regarding his ability to advocate for himself and organize/prioritize his own needs given his own difficult social situation. Would be at significant risk of harm if not discharged to a structured setting with support.    Medication regimen as follows, no changes as of 3/25/2025:  Abilify 5 mg daily as mood adjunct   Zoloft 150 mg daily for depression and anxiety  Continue to encourage participation in group therapy, milieu therapy and occupational therapy.  Continue to assess for side effects of medications.  Continue collaboration with PATRICIA for medical co-morbidities as indicated.  Continue discussion with CM/SW to assist with obtaining collateral, disposition planning, and the implementation of patient-centered individualized plan of care.  Continue frequent safety checks and vitals per unit protocol.    Risks, benefits and possible side effects of Medications: Risks, benefits, and possible side effects of medications have previously been explained. No new medications at this time.      Legal status: 201    Disposition: to be determined, pending SOAR application for  "potential group home placement. OT Cognitive Evaluation completed: \"Pt would benefit from discharge to a supportive environment that can provide checks for safety and compliance with IADLs. \"   Although patient's mood has stabilized, they are currently awaiting group home placement.  Their ability to recognize safety hazards take medications and participate in IADLs are significantly impaired by their cognitive deficits compounded by their chronic mental health needs and would be at risk for significant decompensation without the structure and support of a group home setting.      No associated orders from this encounter found during lookback period of 72 hours.  Autism spectrum disorder  Continue supportive care    No associated orders from this encounter found during lookback period of 72 hours.    Hyperlipidemia  - Lipitor 40 mg       Plan     Recommended Treatment:    - Encourage early mobility and having a structured day  - Provide frequent re-orientation, and cognitive stimulation  - Ensure assistive devices are in proper working order (eye-glasses, hearing aids)  - Encourage adequate hydration, nutrition and monitor bowel movements  - Maintain sleep-wake cycle: Uninterrupted sleep time; low-level lighting at night  - Fall precaution  - f/u [facility] recs regarding the medical problems   - Continue medication titration and treatment plan; adjust medication to optimize treatment response and as clinically indicated. .   - Observation: routine            - VS: as per unit protocol  - Diet: Regular diet  - Encourage group attendance and milieu therapy  - Dispo: To be determined     Scheduled medications:  Current Facility-Administered Medications   Medication Dose Route Frequency Provider Last Rate    acetaminophen  650 mg Oral Q6H PRN Basilio Brown MD      acetaminophen  650 mg Oral Q4H PRN Basilio Brown MD      acetaminophen  975 mg Oral Q6H PRN Basilio Brown MD      aluminum-magnesium " hydroxide-simethicone  30 mL Oral Q4H PRN Basilio Brown MD      ARIPiprazole  5 mg Oral Daily Basilio Panda MD      Artificial Tears  1 drop Both Eyes Q3H PRN Basilio Brown MD      atorvastatin  40 mg Oral Daily With Dinner WALLY Baptiste      benztropine  1 mg Intramuscular Q4H PRN Max 6/day Basilio Brown MD      benztropine  1 mg Oral Q4H PRN Max 6/day Basilio Brown MD      Cholecalciferol  2,500 Units Oral Daily Dustin Mcallister MD      cyanocobalamin  1,000 mcg Oral Daily WALLY Baptiste      Diclofenac Sodium  2 g Topical 4x Daily PRN WALLY Baptiste      hydrOXYzine HCL  50 mg Oral Q6H PRN Max 4/day Basilio Brown MD      Or    diphenhydrAMINE  50 mg Intramuscular Q6H PRN Basilio Brown MD      diphenhydrAMINE-zinc acetate   Topical Daily PRN Raj Guerrero MD      famotidine  20 mg Oral BID WALLY Baptiste      hydrOXYzine HCL  100 mg Oral Q6H PRN Max 4/day Basilio Brown MD      Or    LORazepam  2 mg Intramuscular Q6H PRN Basilio Brown MD      hydrOXYzine HCL  25 mg Oral Q6H PRN Max 4/day Basilio Brown MD      levothyroxine  37.5 mcg Oral Early Morning WALLY Baptiste      melatonin  6 mg Oral HS PRN WALLY Gomez      methocarbamol  500 mg Oral Q6H PRN WALLY Baptiste      OLANZapine  5 mg Oral Q4H PRN Max 3/day Basilio Brown MD      Or    OLANZapine  2.5 mg Intramuscular Q4H PRN Max 3/day Basilio Brown MD      OLANZapine  5 mg Oral Q3H PRN Max 3/day Basilio Brown MD      Or    OLANZapine  5 mg Intramuscular Q3H PRN Max 3/day Basilio Brown MD      OLANZapine  2.5 mg Oral Q4H PRN Max 6/day Basilio Brown MD      polyethylene glycol  17 g Oral Daily PRN Basilio Brown MD      propranolol  10 mg Oral Q8H PRN Basilio Brown MD      senna-docusate sodium  2 tablet Oral After Dinner Dustin Mcallister MD      sertraline  150 mg Oral  "Daily WALLY Gomez      traZODone  50 mg Oral HS PRN Basilio Brown MD        PRN:    acetaminophen    acetaminophen    acetaminophen    aluminum-magnesium hydroxide-simethicone    Artificial Tears    benztropine    benztropine    Diclofenac Sodium    hydrOXYzine HCL **OR** diphenhydrAMINE    diphenhydrAMINE-zinc acetate    hydrOXYzine HCL **OR** LORazepam    hydrOXYzine HCL    melatonin    methocarbamol    OLANZapine **OR** OLANZapine    OLANZapine **OR** OLANZapine    OLANZapine    polyethylene glycol    propranolol    traZODone       Subjective     Patient was visited on unit for continuing care; chart reviewed and discussed with multidisciplinary treatment team.  On approach, the patient was calm and cooperative. Denied any changes in mood, appetite, and energy level. No problem initiating and maintaining sleep.  Denied A/VH currently.  Denied SI/HI, intent or plan upon direct inquiry at this time. Per CM, possible d/c end of this week or beginning of next. Still waiting for personal care home to get back to us to confirm. Pt expresses that he would like a shave prior to leaving.    Patient continues to be visible in the milieu and interacts with staff and peers. No reports of aggression or self-injurious behavior on unit. No PRN medications used in the past 24 hours.    Patient accepted all offered medications and no adverse effects of medications noted or reported.    Objective    Current Mental Status Evaluation:  Mental Status Exam  Appearance: casually dressed, consistent with stated age  Motor: no psychomotor retardation, no gait abnormalities  Behavior: cooperative, answers questions appropriately  Speech: soft, normal rhythm  Mood: \"ok:  Affect: blunted  Thought Process: concrete goal-oriented  Thought Content: denies delusions  Risk Potential: denies suicidal ideation, plan, or intent. Denies homicidal ideation  Perceptions: denies auditory hallucinations, denies visual " hallucinations,   Sensorium: Oriented to person, place, time, and situation  Cognition: cognitive ability appears intact but was not quantitatively tested  Consciousness: alert and awake  Attention: intact, able to focus without difficulty  Insight: limited  Judgement: limited            Vital signs in last 24 hours:    Temp:  [97.7 °F (36.5 °C)-98 °F (36.7 °C)] 97.7 °F (36.5 °C)  HR:  [69-97] 69  BP: (116-125)/(69-74) 125/69  Resp:  [16-18] 18  SpO2:  [95 %-98 %] 95 %  O2 Device: None (Room air)    Psychiatric Review of Systems  Medication Adverse Effects: see HPI  Behaviors: see HPI  Sleep: unchanged  Appetite: unchanged    Laboratory results:    I have personally reviewed all pertinent laboratory/tests results  No results found for this or any previous visit (from the past 48 hours).       Progress Toward Goals & Illness Status:   progressing, attends groups, participates in milieu therapy, mood is stabilizing, placement pending    Patient is not at goal. They are not yet ready for discharge. The patient's condition currently requires active psychopharmacological medication management, interdisciplinary coordination with case management, and the utilization of adjunctive milieu and group therapy to augment psychopharmacological efficacy. The patient's risk of morbidity, and progression or decompensation of psychiatric disease, is higher without this current treatment.     Next of Kin  Extended Emergency Contact Information  Primary Emergency Contact: Lois Roberson  Address: [address] [place] [address] [place]  Home Phone: [phone]  Relation: Mother    Counseling / Coordination of Care  Patient's progress discussed with staff in treatment team meeting.  Medications, treatment progress and treatment plan reviewed with patient.  Medication education provided to patient.  Educated on importance of medication and treatment compliance.  Supportive therapy provided  to patient.  Cognitive techniques utilized during the session.  Reassurance and supportive therapy provided.  Reoriented to reality and reassured.  Encouraged participation in milieu and group therapy on the unit.  Crisis/safety plan discussed with patient.       Dustin Mcallister MD  Attending Psychiatrist   [facility]      This note has been constructed using a voice recognition system. There may be translation, syntax, or grammatical errors. If you have any questions, please contact the dictating provider.

## 2025-03-25 NOTE — ASSESSMENT & PLAN NOTE
"  Patient continues to do well and the plan is to continue with the current treatment plan as mentioned below with no changes.    Patient continues to be pending group home placement. Otherwise no clinical significant change.   continues to look for group home/personal-care home placement however this can be significantly impacted by patient no longer having money in the [employer] account and his bank account being over drawn.  Patient is quite altruistic in his desire not to have his friend go to nursing home however continues to display poor to limited insight regarding his ability to advocate for himself and organize/prioritize his own needs given his own difficult social situation. Would be at significant risk of harm if not discharged to a structured setting with support.    Medication regimen as follows, no changes as of 3/25/2025:  Abilify 5 mg daily as mood adjunct   Zoloft 150 mg daily for depression and anxiety  Continue to encourage participation in group therapy, milieu therapy and occupational therapy.  Continue to assess for side effects of medications.  Continue collaboration with [facility] for medical co-morbidities as indicated.  Continue discussion with CM/SW to assist with obtaining collateral, disposition planning, and the implementation of patient-centered individualized plan of care.  Continue frequent safety checks and vitals per unit protocol.    Risks, benefits and possible side effects of Medications: Risks, benefits, and possible side effects of medications have previously been explained. No new medications at this time.      Legal status: 201    Disposition: to be determined, pending [facility]AR application for potential group home placement. OT Cognitive Evaluation completed: \"Pt would benefit from discharge to a supportive environment that can provide checks for safety and compliance with IADLs. \"   Although patient's mood has stabilized, they are currently awaiting group home placement.  Their " ability to recognize safety hazards take medications and participate in IADLs are significantly impaired by their cognitive deficits compounded by their chronic mental health needs and would be at risk for significant decompensation without the structure and support of a group home setting.      No associated orders from this encounter found during lookback period of 72 hours.

## 2025-03-25 NOTE — PLAN OF CARE
Problem: Ineffective Coping  Goal: Participates in unit activities  Description: Interventions:  - Provide therapeutic environment   - Provide required programming   - Redirect inappropriate behaviors   Outcome: Progressing     Problem: Depression  Goal: Treatment Goal: Demonstrate behavioral control of depressive symptoms, verbalize feelings of improved mood/affect, and adopt new coping skills prior to discharge  Outcome: Progressing  Goal: Verbalize thoughts and feelings  Description: Interventions:  - Assess and re-assess patient's level of risk   - Engage patient in 1:1 interactions, daily, for a minimum of 15 minutes   - Encourage patient to express feelings, fears, frustrations, hopes   Outcome: Progressing  Goal: Refrain from harming self  Description: Interventions:  - Monitor patient closely, per order   - Supervise medication ingestion, monitor effects and side effects   Outcome: Progressing  Goal: Refrain from isolation  Description: Interventions:  - Develop a trusting relationship   - Encourage socialization   Outcome: Progressing     Problem: Anxiety  Goal: Anxiety is at manageable level  Description: Interventions:  - Assess and monitor patient's anxiety level.   - Monitor for signs and symptoms (heart palpitations, chest pain, shortness of breath, headaches, nausea, feeling jumpy, restlessness, irritable, apprehensive).   - Collaborate with interdisciplinary team and initiate plan and interventions as ordered.  - [place] patient to unit/surroundings  - Explain treatment plan  - Encourage participation in care  - Encourage verbalization of concerns/fears  - Identify coping mechanisms  - Assist in developing anxiety-reducing skills  - Administer/offer alternative therapies  - Limit or eliminate stimulants  Outcome: Progressing     Problem: DISCHARGE PLANNING - CARE MANAGEMENT  Goal: Discharge to post-acute care or home with appropriate resources  Description: INTERVENTIONS:  - Conduct assessment to  determine patient/family and health care team treatment goals, and need for post-acute services based on payer coverage, community resources, and patient preferences, and barriers to discharge  - Address psychosocial, clinical, and financial barriers to discharge as identified in assessment in conjunction with the patient/family and health care team  - Arrange appropriate level of post-acute services according to patient’s   needs and preference and payer coverage in collaboration with the physician and health care team  - Communicate with and update the patient/family, physician, and health care team regarding progress on the discharge plan  - Arrange appropriate transportation to post-acute venues  Outcome: Progressing     Problem: Knowledge Deficit  Goal: Patient/family/caregiver demonstrates understanding of disease process, treatment plan, medications, and discharge instructions  Description: Complete learning assessment and assess knowledge base.  Interventions:  - Provide teaching at level of understanding  - Provide teaching via preferred learning methods  Outcome: Progressing     Problem: SLEEP DISTURBANCE  Goal: Will exhibit normal sleeping pattern  Description: Interventions:  -  Assess the patients sleep pattern, noting recent changes  - Administer medication as ordered  - Decrease environmental stimuli, including noise, as appropriate during the night  - Encourage the patient to actively participate in unit groups and or exercise during the day to enhance ability to achieve adequate sleep at night  - Assess the patient, in the morning, encouraging a description of sleep experience  Outcome: Progressing

## 2025-03-25 NOTE — PROGRESS NOTES
03/25/25 0845   Team Meeting   Meeting Type Daily Rounds   Team Members Present   Team Members Present Physician;Nurse;   Physician Team Member Ary   [facility]ing Team [facility] Suhas   Patient/Family Present   Patient Present No   Patient's Family Present No     Pt med/meal compliant. Visible on the unit. Pleasant, calm, cooperative. Discharge pending placement.

## 2025-03-25 NOTE — NURSING NOTE
Currently attending group. Pleasant, calm and cooperative. Quiet and mainly keeps to himself. Denies symptoms. Compliant with medications. Waiting placement.

## 2025-03-26 PROCEDURE — 99232 SBSQ HOSP IP/OBS MODERATE 35: CPT | Performed by: PSYCHIATRY & NEUROLOGY

## 2025-03-26 RX ORDER — LEVOTHYROXINE SODIUM 75 UG/1
37.5 TABLET ORAL
Qty: 15 TABLET | Refills: 1 | Status: SHIPPED | OUTPATIENT
Start: 2025-03-26

## 2025-03-26 RX ORDER — AMOXICILLIN 250 MG
2 CAPSULE ORAL
Qty: 60 TABLET | Refills: 1 | Status: SHIPPED | OUTPATIENT
Start: 2025-03-26

## 2025-03-26 RX ORDER — ATORVASTATIN CALCIUM 40 MG/1
40 TABLET, FILM COATED ORAL
Qty: 30 TABLET | Refills: 1 | Status: SHIPPED | OUTPATIENT
Start: 2025-03-26

## 2025-03-26 RX ORDER — ARIPIPRAZOLE 5 MG/1
5 TABLET ORAL DAILY
Qty: 30 TABLET | Refills: 1 | Status: SHIPPED | OUTPATIENT
Start: 2025-03-26

## 2025-03-26 RX ORDER — FAMOTIDINE 20 MG/1
20 TABLET, FILM COATED ORAL 2 TIMES DAILY
Qty: 60 TABLET | Refills: 1 | Status: SHIPPED | OUTPATIENT
Start: 2025-03-26

## 2025-03-26 RX ADMIN — Medication 2500 UNITS: at 08:21

## 2025-03-26 RX ADMIN — FAMOTIDINE 20 MG: 20 TABLET ORAL at 08:22

## 2025-03-26 RX ADMIN — CYANOCOBALAMIN TAB 1000 MCG 1000 MCG: 1000 TAB at 08:22

## 2025-03-26 RX ADMIN — ATORVASTATIN CALCIUM 40 MG: 40 TABLET, FILM COATED ORAL at 17:07

## 2025-03-26 RX ADMIN — ARIPIPRAZOLE 5 MG: 5 TABLET ORAL at 08:22

## 2025-03-26 RX ADMIN — LEVOTHYROXINE SODIUM 37.5 MCG: 0.07 TABLET ORAL at 06:16

## 2025-03-26 RX ADMIN — FAMOTIDINE 20 MG: 20 TABLET ORAL at 17:07

## 2025-03-26 RX ADMIN — SERTRALINE HYDROCHLORIDE 150 MG: 100 TABLET ORAL at 08:21

## 2025-03-26 RX ADMIN — SENNOSIDES AND DOCUSATE SODIUM 2 TABLET: 50; 8.6 TABLET ORAL at 17:07

## 2025-03-26 NOTE — PROGRESS NOTES
Progress Note - Behavioral Health   Name: Franco Roberson 39 y.o. male I MRN: [id number]  Unit/Bed#: -02 I Date of Admission: 5/14/2024   Date of Service: 3/26/2025 I Hospital Day: 316     Assessment & Plan  MDD (major depressive disorder), recurrent severe, without psychosis (HCC)    Patient continues to do well and the plan is to continue with the current treatment plan as mentioned below with no changes.    Patient continues to be pending group home placement. Otherwise no clinical significant change.   continues to look for group home/personal-care home placement however this can be significantly impacted by patient no longer having money in the [employer] account and his bank account being over drawn.  Patient is quite altruistic in his desire not to have his friend go to care home however continues to display poor to limited insight regarding his ability to advocate for himself and organize/prioritize his own needs given his own difficult social situation. Would be at significant risk of harm if not discharged to a structured setting with support.    Medication regimen as follows, no changes as of 3/26/2025:  Abilify 5 mg daily as mood adjunct   Zoloft 150 mg daily for depression and anxiety  Continue to encourage participation in group therapy, milieu therapy and occupational therapy.  Continue to assess for side effects of medications.  Continue collaboration with PATRICIA for medical co-morbidities as indicated.  Continue discussion with CM/SW to assist with obtaining collateral, disposition planning, and the implementation of patient-centered individualized plan of care.  Continue frequent safety checks and vitals per unit protocol.    Risks, benefits and possible side effects of Medications: Risks, benefits, and possible side effects of medications have previously been explained. No new medications at this time.      Legal status: 201    Disposition: to be determined, pending SOAR application for  "potential group home placement. OT Cognitive Evaluation completed: \"Pt would benefit from discharge to a supportive environment that can provide checks for safety and compliance with IADLs. \"   Although patient's mood has stabilized, they are currently awaiting group home placement.  Their ability to recognize safety hazards take medications and participate in IADLs are significantly impaired by their cognitive deficits compounded by their chronic mental health needs and would be at risk for significant decompensation without the structure and support of a group home setting.      Orders from past 72 hours:    ARIPiprazole (ABILIFY) 5 mg tablet; Take 1 tablet (5 mg total) by mouth daily    sertraline (ZOLOFT) 50 mg tablet; Take 3 tablets (150 mg total) by mouth daily    Autism spectrum disorder  Continue supportive care    No associated orders from this encounter found during lookback period of 72 hours.    Hyperlipidemia  - Lipitor 40 mg       Plan     Recommended Treatment:    - Encourage early mobility and having a structured day  - Provide frequent re-orientation, and cognitive stimulation  - Ensure assistive devices are in proper working order (eye-glasses, hearing aids)  - Encourage adequate hydration, nutrition and monitor bowel movements  - Maintain sleep-wake cycle: Uninterrupted sleep time; low-level lighting at night  - Fall precaution  - f/u [facility]IM recs regarding the medical problems   - Continue medication titration and treatment plan; adjust medication to optimize treatment response and as clinically indicated. .   - Observation: routine            - VS: as per unit protocol  - Diet: Regular diet  - Encourage group attendance and milieu therapy  - Dispo: To be determined     Scheduled medications:  Current Facility-Administered Medications   Medication Dose Route Frequency Provider Last Rate    acetaminophen  650 mg Oral Q6H PRN Basilio Brown MD      acetaminophen  650 mg Oral Q4H PRN Basilio Garcia" MD Karyn      acetaminophen  975 mg Oral Q6H PRN Basilio Brown MD      aluminum-magnesium hydroxide-simethicone  30 mL Oral Q4H PRN Basilio Brown MD      ARIPiprazole  5 mg Oral Daily Basilio Panda MD      Artificial Tears  1 drop Both Eyes Q3H PRN Basilio Brown MD      atorvastatin  40 mg Oral Daily With Dinner WALLY Baptiste      benztropine  1 mg Intramuscular Q4H PRN Max 6/day Basilio Brown MD      benztropine  1 mg Oral Q4H PRN Max 6/day Basilio Brown MD      Cholecalciferol  2,500 Units Oral Daily Dustin Mcallister MD      cyanocobalamin  1,000 mcg Oral Daily WALLY Baptiste      Diclofenac Sodium  2 g Topical 4x Daily PRN WALLY Baptiste      hydrOXYzine HCL  50 mg Oral Q6H PRN Max 4/day Basilio Brown MD      Or    diphenhydrAMINE  50 mg Intramuscular Q6H PRN Basilio Brown MD      diphenhydrAMINE-zinc acetate   Topical Daily PRN Raj Guerrero MD      famotidine  20 mg Oral BID WALLY Baptiste      hydrOXYzine HCL  100 mg Oral Q6H PRN Max 4/day Basilio Brown MD      Or    LORazepam  2 mg Intramuscular Q6H PRN Basilio Brown MD      hydrOXYzine HCL  25 mg Oral Q6H PRN Max 4/day Basilio Brown MD      levothyroxine  37.5 mcg Oral Early Morning WALLY Baptiste      melatonin  6 mg Oral HS PRN WALLY Gomez      methocarbamol  500 mg Oral Q6H PRN WALLY Baptiste      OLANZapine  5 mg Oral Q4H PRN Max 3/day Basilio Brown MD      Or    OLANZapine  2.5 mg Intramuscular Q4H PRN Max 3/day Basilio Brown MD      OLANZapine  5 mg Oral Q3H PRN Max 3/day Basilio Brown MD      Or    OLANZapine  5 mg Intramuscular Q3H PRN Max 3/day Basilio Brown MD      OLANZapine  2.5 mg Oral Q4H PRN Max 6/day Basilio Brown MD      polyethylene glycol  17 g Oral Daily PRN Basilio Brown MD      propranolol  10 mg Oral Q8H PRN Basilio Brown MD       senna-docusate sodium  2 tablet Oral After Dinner Dustin Mcallister MD      sertraline  150 mg Oral Daily WALLY Gomez      traZODone  50 mg Oral HS PRN Basilio rBown MD        PRN:    acetaminophen    acetaminophen    acetaminophen    aluminum-magnesium hydroxide-simethicone    Artificial Tears    benztropine    benztropine    Diclofenac Sodium    hydrOXYzine HCL **OR** diphenhydrAMINE    diphenhydrAMINE-zinc acetate    hydrOXYzine HCL **OR** LORazepam    hydrOXYzine HCL    melatonin    methocarbamol    OLANZapine **OR** OLANZapine    OLANZapine **OR** OLANZapine    OLANZapine    polyethylene glycol    propranolol    traZODone       Subjective     Patient was visited on unit for continuing care; chart reviewed and discussed with multidisciplinary treatment team.  On approach, the patient was calm and cooperative. Denied any changes in mood, appetite, and energy level. No problem initiating and maintaining sleep.  Denied A/VH currently.  Denied SI/HI, intent or plan upon direct inquiry at this time.  No acute complaints or concerns this morning.  Patient continues to feel good that he is likely going to the personal-care home soon but also somewhat nervous understandably.  Discussed with patient his ambivalent feelings about that.  He is looking forward to speaking more with case management to get more details on exact location of the personal-care home.    Patient continues to be visible in the milieu and interacts with staff and peers. No reports of aggression or self-injurious behavior on unit. No PRN medications used in the past 24 hours.    Patient accepted all offered medications and no adverse effects of medications noted or reported.    Objective    Current Mental Status Evaluation:  Mental Status Exam  Appearance: casually dressed, consistent with stated age  Motor: no psychomotor retardation, no gait abnormalities  Behavior: cooperative, answers questions appropriately  Speech: soft,  "normal rhythm  Mood: \"ok\"  Affect: blunted  Thought Process: linear and goal-oriented  Thought Content: denies delusions or paranoia   Risk Potential: denies suicidal ideation, plan, or intent. Denies homicidal ideation  Perceptions: denies auditory hallucinations, denies visual hallucinations,   Sensorium: Oriented to person, place, time, and situation  Cognition: cognitive ability appears intact but was not quantitatively tested  Consciousness: alert and awake  Attention: intact, able to focus without difficulty  Insight: limited  Judgement: limited            Vital signs in last 24 hours:    Temp:  [96.4 °F (35.8 °C)-96.8 °F (36 °C)] 96.8 °F (36 °C)  HR:  [64-73] 64  BP: (103-104)/(66-67) 103/67  Resp:  [16] 16  SpO2:  [97 %-99 %] 99 %  O2 Device: None (Room air)    Psychiatric Review of Systems  Medication Adverse Effects: see HPI  Behaviors: see HPI  Sleep: unchanged  Appetite: unchanged    Laboratory results:    I have personally reviewed all pertinent laboratory/tests results  No results found for this or any previous visit (from the past 48 hours).       Progress Toward Goals & Illness Status:   progressing, attends groups, participates in milieu therapy, discharge planning, placement pending    Patient is not at goal. They are not yet ready for discharge. The patient's condition currently requires active psychopharmacological medication management, interdisciplinary coordination with case management, and the utilization of adjunctive milieu and group therapy to augment psychopharmacological efficacy. The patient's risk of morbidity, and progression or decompensation of psychiatric disease, is higher without this current treatment.     Next of Kin  Extended Emergency Contact Information  Primary Emergency Contact: Lois Roberson  Address: [address] [place] [address] [place]           [address] [place] [address] of [place]  Home Phone: [phone]  Relation: Mother    Counseling / Coordination of " Care  Patient's progress discussed with staff in treatment team meeting.  Medications, treatment progress and treatment plan reviewed with patient.  Medication education provided to patient.  Educated on importance of medication and treatment compliance.  Supportive therapy provided to patient.  Cognitive techniques utilized during the session.  Reassurance and supportive therapy provided.  Reoriented to reality and reassured.  Encouraged participation in milieu and group therapy on the unit.  Crisis/safety plan discussed with patient.       Dsutin Mcallister MD  Attending Psychiatrist   [facility]      This note has been constructed using a voice recognition system. There may be translation, syntax, or grammatical errors. If you have any questions, please contact the dictating provider.

## 2025-03-26 NOTE — NURSING NOTE
Compliant with morning medications. Pleasant, calm and cooperative. Appropriate. Denies symptoms and waiting for placement.

## 2025-03-26 NOTE — PLAN OF CARE
Problem: Ineffective Coping  Goal: Participates in unit activities  Description: Interventions:  - Provide therapeutic environment   - Provide required programming   - Redirect inappropriate behaviors   Outcome: Progressing     Problem: Depression  Goal: Treatment Goal: Demonstrate behavioral control of depressive symptoms, verbalize feelings of improved mood/affect, and adopt new coping skills prior to discharge  Outcome: Progressing  Goal: Verbalize thoughts and feelings  Description: Interventions:  - Assess and re-assess patient's level of risk   - Engage patient in 1:1 interactions, daily, for a minimum of 15 minutes   - Encourage patient to express feelings, fears, frustrations, hopes   Outcome: Progressing  Goal: Refrain from harming self  Description: Interventions:  - Monitor patient closely, per order   - Supervise medication ingestion, monitor effects and side effects   Outcome: Progressing  Goal: Refrain from isolation  Description: Interventions:  - Develop a trusting relationship   - Encourage socialization   Outcome: Progressing     Problem: Anxiety  Goal: Anxiety is at manageable level  Description: Interventions:  - Assess and monitor patient's anxiety level.   - Monitor for signs and symptoms (heart palpitations, chest pain, shortness of breath, headaches, nausea, feeling jumpy, restlessness, irritable, apprehensive).   - Collaborate with interdisciplinary team and initiate plan and interventions as ordered.  - [place] patient to unit/surroundings  - Explain treatment plan  - Encourage participation in care  - Encourage verbalization of concerns/fears  - Identify coping mechanisms  - Assist in developing anxiety-reducing skills  - Administer/offer alternative therapies  - Limit or eliminate stimulants  Outcome: Progressing     Problem: DISCHARGE PLANNING - CARE MANAGEMENT  Goal: Discharge to post-acute care or home with appropriate resources  Description: INTERVENTIONS:  - Conduct assessment to  determine patient/family and health care team treatment goals, and need for post-acute services based on payer coverage, community resources, and patient preferences, and barriers to discharge  - Address psychosocial, clinical, and financial barriers to discharge as identified in assessment in conjunction with the patient/family and health care team  - Arrange appropriate level of post-acute services according to patient’s   needs and preference and payer coverage in collaboration with the physician and health care team  - Communicate with and update the patient/family, physician, and health care team regarding progress on the discharge plan  - Arrange appropriate transportation to post-acute venues  Outcome: Progressing     Problem: Knowledge Deficit  Goal: Patient/family/caregiver demonstrates understanding of disease process, treatment plan, medications, and discharge instructions  Description: Complete learning assessment and assess knowledge base.  Interventions:  - Provide teaching at level of understanding  - Provide teaching via preferred learning methods  Outcome: Progressing     Problem: SLEEP DISTURBANCE  Goal: Will exhibit normal sleeping pattern  Description: Interventions:  -  Assess the patients sleep pattern, noting recent changes  - Administer medication as ordered  - Decrease environmental stimuli, including noise, as appropriate during the night  - Encourage the patient to actively participate in unit groups and or exercise during the day to enhance ability to achieve adequate sleep at night  - Assess the patient, in the morning, encouraging a description of sleep experience  Outcome: Progressing

## 2025-03-26 NOTE — ASSESSMENT & PLAN NOTE
"  Patient continues to do well and the plan is to continue with the current treatment plan as mentioned below with no changes.    Patient continues to be pending group home placement. Otherwise no clinical significant change.   continues to look for group home/personal-care home placement however this can be significantly impacted by patient no longer having money in the [employer] account and his bank account being over drawn.  Patient is quite altruistic in his desire not to have his friend go to long term however continues to display poor to limited insight regarding his ability to advocate for himself and organize/prioritize his own needs given his own difficult social situation. Would be at significant risk of harm if not discharged to a structured setting with support.    Medication regimen as follows, no changes as of 3/26/2025:  Abilify 5 mg daily as mood adjunct   Zoloft 150 mg daily for depression and anxiety  Continue to encourage participation in group therapy, milieu therapy and occupational therapy.  Continue to assess for side effects of medications.  Continue collaboration with SLIM for medical co-morbidities as indicated.  Continue discussion with CM/SW to assist with obtaining collateral, disposition planning, and the implementation of patient-centered individualized plan of care.  Continue frequent safety checks and vitals per unit protocol.    Risks, benefits and possible side effects of Medications: Risks, benefits, and possible side effects of medications have previously been explained. No new medications at this time.      Legal status: 201    Disposition: to be determined, pending SOAR application for potential group home placement. OT Cognitive Evaluation completed: \"Pt would benefit from discharge to a supportive environment that can provide checks for safety and compliance with IADLs. \"   Although patient's mood has stabilized, they are currently awaiting group home placement.  Their " ability to recognize safety hazards take medications and participate in IADLs are significantly impaired by their cognitive deficits compounded by their chronic mental health needs and would be at risk for significant decompensation without the structure and support of a group home setting.      Orders from past 72 hours:    ARIPiprazole (ABILIFY) 5 mg tablet; Take 1 tablet (5 mg total) by mouth daily    sertraline (ZOLOFT) 50 mg tablet; Take 3 tablets (150 mg total) by mouth daily

## 2025-03-26 NOTE — PLAN OF CARE
Pt has been attending groups more regularly. Pt participates appropriately and has been participating with less prompting.

## 2025-03-26 NOTE — PROGRESS NOTES
03/26/25 0845   Team Meeting   Meeting Type Daily Rounds   Team Members Present   Team Members Present Physician;Nurse;   Physician Team Member Ary   Nursing Team Member [facility] Management Team Member Noa   Patient/Family Present   Patient Present No   Patient's Family Present No     Pt is medication and meal compliant. Pt denies SI/HI/AVH. Pt is calm and cooperative. Pt is social with selective peers. Pt participates in group. Pt's discharge is pending [facility].

## 2025-03-26 NOTE — NURSING NOTE
Pt visible on unit in milieu on occasion. He interacts politely with staff and peers if spoken to. Medication and meal compliant. He denies SI/HI/AH/VH, and reports feeling better about being accepted at a group home. No PRN's requested or required. No unmet needs.

## 2025-03-27 PROCEDURE — 99232 SBSQ HOSP IP/OBS MODERATE 35: CPT | Performed by: PSYCHIATRY & NEUROLOGY

## 2025-03-27 RX ADMIN — CYANOCOBALAMIN TAB 1000 MCG 1000 MCG: 1000 TAB at 08:09

## 2025-03-27 RX ADMIN — SERTRALINE HYDROCHLORIDE 150 MG: 100 TABLET ORAL at 08:19

## 2025-03-27 RX ADMIN — ATORVASTATIN CALCIUM 40 MG: 40 TABLET, FILM COATED ORAL at 17:02

## 2025-03-27 RX ADMIN — FAMOTIDINE 20 MG: 20 TABLET ORAL at 08:19

## 2025-03-27 RX ADMIN — Medication 2500 UNITS: at 08:08

## 2025-03-27 RX ADMIN — ARIPIPRAZOLE 5 MG: 5 TABLET ORAL at 08:08

## 2025-03-27 RX ADMIN — SENNOSIDES AND DOCUSATE SODIUM 2 TABLET: 50; 8.6 TABLET ORAL at 17:02

## 2025-03-27 RX ADMIN — LEVOTHYROXINE SODIUM 37.5 MCG: 0.07 TABLET ORAL at 06:52

## 2025-03-27 RX ADMIN — FAMOTIDINE 20 MG: 20 TABLET ORAL at 17:02

## 2025-03-27 NOTE — PROGRESS NOTES
03/27/25 0835   Team Meeting   Meeting Type Daily Rounds   Team Members Present   Team Members Present Physician;Nurse;   Physician Team Member Ary   Nursing Team Member [facility] Team Member Noa   Patient/Family Present   Patient Present No   Patient's Family Present No     Pt is medication and meal compliant. Pt denies SI/HI/AVH. Pt shaved his moore. Pt is calm and cooperative. Pt's discharge is pending for placement Monday.

## 2025-03-27 NOTE — PLAN OF CARE
Problem: Depression  Goal: Treatment Goal: Demonstrate behavioral control of depressive symptoms, verbalize feelings of improved mood/affect, and adopt new coping skills prior to discharge  Outcome: Progressing  Goal: Verbalize thoughts and feelings  Description: Interventions:  - Assess and re-assess patient's level of risk   - Engage patient in 1:1 interactions, daily, for a minimum of 15 minutes   - Encourage patient to express feelings, fears, frustrations, hopes   Outcome: Progressing  Goal: Refrain from harming self  Description: Interventions:  - Monitor patient closely, per order   - Supervise medication ingestion, monitor effects and side effects   Outcome: Progressing  Goal: Refrain from isolation  Description: Interventions:  - Develop a trusting relationship   - Encourage socialization   Outcome: Progressing     Problem: Anxiety  Goal: Anxiety is at manageable level  Description: Interventions:  - Assess and monitor patient's anxiety level.   - Monitor for signs and symptoms (heart palpitations, chest pain, shortness of breath, headaches, nausea, feeling jumpy, restlessness, irritable, apprehensive).   - Collaborate with interdisciplinary team and initiate plan and interventions as ordered.  - [place] patient to unit/surroundings  - Explain treatment plan  - Encourage participation in care  - Encourage verbalization of concerns/fears  - Identify coping mechanisms  - Assist in developing anxiety-reducing skills  - Administer/offer alternative therapies  - Limit or eliminate stimulants  Outcome: Progressing     Problem: DISCHARGE PLANNING - CARE MANAGEMENT  Goal: Discharge to post-acute care or home with appropriate resources  Description: INTERVENTIONS:  - Conduct assessment to determine patient/family and health care team treatment goals, and need for post-acute services based on payer coverage, community resources, and patient preferences, and barriers to discharge  - Address psychosocial, clinical,  and financial barriers to discharge as identified in assessment in conjunction with the patient/family and health care team  - Arrange appropriate level of post-acute services according to patient’s   needs and preference and payer coverage in collaboration with the physician and health care team  - Communicate with and update the patient/family, physician, and health care team regarding progress on the discharge plan  - Arrange appropriate transportation to post-acute venues  Outcome: Progressing     Problem: Knowledge Deficit  Goal: Patient/family/caregiver demonstrates understanding of disease process, treatment plan, medications, and discharge instructions  Description: Complete learning assessment and assess knowledge base.  Interventions:  - Provide teaching at level of understanding  - Provide teaching via preferred learning methods  Outcome: Progressing     Problem: SLEEP DISTURBANCE  Goal: Will exhibit normal sleeping pattern  Description: Interventions:  -  Assess the patients sleep pattern, noting recent changes  - Administer medication as ordered  - Decrease environmental stimuli, including noise, as appropriate during the night  - Encourage the patient to actively participate in unit groups and or exercise during the day to enhance ability to achieve adequate sleep at night  - Assess the patient, in the morning, encouraging a description of sleep experience  Outcome: Progressing

## 2025-03-27 NOTE — ASSESSMENT & PLAN NOTE
"  Patient continues to do well and the plan is to continue with the current treatment plan as mentioned below with no changes.    Patient continues to be pending group home placement. Otherwise no clinical significant change.   continues to look for group home/personal-care home placement however this can be significantly impacted by patient no longer having money in the [employer] account and his bank account being over drawn.  Patient is quite altruistic in his desire not to have his friend go to group home however continues to display poor to limited insight regarding his ability to advocate for himself and organize/prioritize his own needs given his own difficult social situation. Would be at significant risk of harm if not discharged to a structured setting with support.    Medication regimen as follows, no changes as of 3/27/2025:  Abilify 5 mg daily as mood adjunct   Zoloft 150 mg daily for depression and anxiety  Continue to encourage participation in group therapy, milieu therapy and occupational therapy.  Continue to assess for side effects of medications.  Continue collaboration with [facility] for medical co-morbidities as indicated.  Continue discussion with CM/SW to assist with obtaining collateral, disposition planning, and the implementation of patient-centered individualized plan of care.  Continue frequent safety checks and vitals per unit protocol.    Risks, benefits and possible side effects of Medications: Risks, benefits, and possible side effects of medications have previously been explained. No new medications at this time.      Legal status: 201    Disposition: to be determined, pending SOAR application for potential group home placement. OT Cognitive Evaluation completed: \"Pt would benefit from discharge to a supportive environment that can provide checks for safety and compliance with IADLs. \"   Although patient's mood has stabilized, they are currently awaiting group home placement.  Their " ability to recognize safety hazards take medications and participate in IADLs are significantly impaired by their cognitive deficits compounded by their chronic mental health needs and would be at risk for significant decompensation without the structure and support of a group home setting.      Orders from past 72 hours:    ARIPiprazole (ABILIFY) 5 mg tablet; Take 1 tablet (5 mg total) by mouth daily    sertraline (ZOLOFT) 50 mg tablet; Take 3 tablets (150 mg total) by mouth daily

## 2025-03-27 NOTE — NURSING NOTE
Patient doing laps through out the hallway. Attended morning meeting. Pleasant during interaction. Cooperative. Denies symptoms. Hopeful for discharge on Monday.

## 2025-03-27 NOTE — PROGRESS NOTES
Progress Note - Behavioral Health   Name: Franco Roberson 39 y.o. male I MRN: [id number]  Unit/Bed#: -02 I Date of Admission: 5/14/2024   Date of Service: 3/27/2025 I Hospital Day: 317     Assessment & Plan  MDD (major depressive disorder), recurrent severe, without psychosis (HCC)    Patient continues to do well and the plan is to continue with the current treatment plan as mentioned below with no changes.    Patient continues to be pending group home placement. Otherwise no clinical significant change.   continues to look for group home/personal-care home placement however this can be significantly impacted by patient no longer having money in the [employer] account and his bank account being over drawn.  Patient is quite altruistic in his desire not to have his friend go to [facility] however continues to display poor to limited insight regarding his ability to advocate for himself and organize/prioritize his own needs given his own difficult social situation. Would be at significant risk of harm if not discharged to a structured setting with support.    Medication regimen as follows, no changes as of 3/27/2025:  Abilify 5 mg daily as mood adjunct   Zoloft 150 mg daily for depression and anxiety  Continue to encourage participation in group therapy, milieu therapy and occupational therapy.  Continue to assess for side effects of medications.  Continue collaboration with [facility] for medical co-morbidities as indicated.  Continue discussion with CM/SW to assist with obtaining collateral, disposition planning, and the implementation of patient-centered individualized plan of care.  Continue frequent safety checks and vitals per unit protocol.    Risks, benefits and possible side effects of Medications: Risks, benefits, and possible side effects of medications have previously been explained. No new medications at this time.      Legal status: 201    Disposition: to be determined, pending SOAR application for  "potential group home placement. OT Cognitive Evaluation completed: \"Pt would benefit from discharge to a supportive environment that can provide checks for safety and compliance with IADLs. \"   Although patient's mood has stabilized, they are currently awaiting group home placement.  Their ability to recognize safety hazards take medications and participate in IADLs are significantly impaired by their cognitive deficits compounded by their chronic mental health needs and would be at risk for significant decompensation without the structure and support of a group home setting.      Orders from past 72 hours:    ARIPiprazole (ABILIFY) 5 mg tablet; Take 1 tablet (5 mg total) by mouth daily    sertraline (ZOLOFT) 50 mg tablet; Take 3 tablets (150 mg total) by mouth daily    Autism spectrum disorder  Continue supportive care    No associated orders from this encounter found during lookback period of 72 hours.    Hyperlipidemia  - Lipitor 40 mg     Current medications:  Current Facility-Administered Medications   Medication Dose Route Frequency Provider Last Rate    acetaminophen  650 mg Oral Q6H PRN Basilio Brown MD      acetaminophen  650 mg Oral Q4H PRN Basilio Brown MD      acetaminophen  975 mg Oral Q6H PRN Basilio Brown MD      aluminum-magnesium hydroxide-simethicone  30 mL Oral Q4H PRN Basilio Brown MD      ARIPiprazole  5 mg Oral Daily Basilio Panda MD      Artificial Tears  1 drop Both Eyes Q3H PRN Basilio Brown MD      atorvastatin  40 mg Oral Daily With Dinner WALLY Baptiste      benztropine  1 mg Intramuscular Q4H PRN Max 6/day Basilio Brown MD      benztropine  1 mg Oral Q4H PRN Max 6/day Basilio Brown MD      Cholecalciferol  2,500 Units Oral Daily Dustin Mcallister MD      cyanocobalamin  1,000 mcg Oral Daily WALLY Baptiste      Diclofenac Sodium  2 g Topical 4x Daily PRN WALLY Baptiste      hydrOXYzine HCL  50 mg Oral " Q6H PRN Max 4/day Basilio Brown MD      Or    diphenhydrAMINE  50 mg Intramuscular Q6H PRN Basilio Brown MD      diphenhydrAMINE-zinc acetate   Topical Daily PRN Raj Guerrero MD      famotidine  20 mg Oral BID Laura WALLY Olmos      hydrOXYzine HCL  100 mg Oral Q6H PRN Max 4/day Basilio Brown MD      Or    LORazepam  2 mg Intramuscular Q6H PRN Basilio Brown MD      hydrOXYzine HCL  25 mg Oral Q6H PRN Max 4/day Basilio Brown MD      levothyroxine  37.5 mcg Oral Early Morning WALLY Baptiste      melatonin  6 mg Oral HS PRN WALLY Gomez      methocarbamol  500 mg Oral Q6H PRN WALLY Baptiste      OLANZapine  5 mg Oral Q4H PRN Max 3/day Basilio Brown MD      Or    OLANZapine  2.5 mg Intramuscular Q4H PRN Max 3/day Basilio Brown MD      OLANZapine  5 mg Oral Q3H PRN Max 3/day Basilio Brown MD      Or    OLANZapine  5 mg Intramuscular Q3H PRN Max 3/day Basilio Brown MD      OLANZapine  2.5 mg Oral Q4H PRN Max 6/day Basilio Brown MD      polyethylene glycol  17 g Oral Daily PRN Basilio Brown MD      propranolol  10 mg Oral Q8H PRN Basilio Brown MD      senna-docusate sodium  2 tablet Oral After Dinner Dustin Mcallister MD      sertraline  150 mg Oral Daily WALLY Gomez      traZODone  50 mg Oral HS PRN Basilio Brown MD          Risks/Benefits of Treatment:     Risks, benefits, and possible side effects of medications explained to patient and patient verbalizes understanding and agreement for treatment.    Progress Toward Goals: progressing, mood is stabilizing, discharge planning, placement pending    Treatment Planning:     All current active medications have been reviewed.  Continue to monitor response to treatment and assess for potential side effects of medications.  Encourage group therapy, milieu therapy and occupational therapy  Collaboration with medical service for medical  comorbidities as indicated.  Behavioral Health checks for safety monitoring.  Long Stay Certification: Requires continued inpatient treatment while awaiting Personal Care Boarding Home placement due to chronic psychiatric illness (depression), ongoing limited insight, limited judgement, high risk of psychiatric decompensation, readmission and becoming a risk of danger to self/others if discharged to an unstructured environment without adequate support.  Estimated Discharge Day: 4/9/2025       Subjective     Behavior over the last 24 hours: unchanged.    Patient was visited on unit for continuing care; chart reviewed and discussed with multidisciplinary treatment team.  On approach, the patient was calm and cooperative. Denied any changes in mood, appetite, and energy level. No problem initiating and maintaining sleep.  Denied A/VH currently.  Denied SI/HI, intent or plan upon direct inquiry at this time. Patient has shaved in anticipation of discharge next week to personal-care [place].  Patient's affect is brighter comparatively.  He has no acute complaints or concerns at this time    Patient continues to be visible in the milieu and interacts with staff and peers. No reports of aggression or self-injurious behavior on unit. No PRN medications used in the past 24 hours.    Patient accepted all offered medications and no adverse effects of medications noted or reported.        Sleep: normal  Appetite: normal  Medication side effects: No  ROS: review of systems as noted above in HPI/Subjective report, all other systems are negative    Objective :  Temp:  [97.5 °F (36.4 °C)-98 °F (36.7 °C)] 97.5 °F (36.4 °C)  HR:  [77-78] 77  BP: (102-115)/(66-74) 115/66  Resp:  [18] 18  SpO2:  [95 %-96 %] 95 %  O2 Device: None (Room air)    Temp:  [97.5 °F (36.4 °C)-98 °F (36.7 °C)] 97.5 °F (36.4 °C)  HR:  [77-78] 77  BP: (102-115)/(66-74) 115/66  Resp:  [18] 18  SpO2:  [95 %-96 %] 95 %  O2 Device: None (Room air)    Mental Status  "Evaluation:  Appearance: casually dressed, consistent with stated age  Motor: no psychomotor retardation, no gait abnormalities  Behavior: cooperative, answers questions appropriately  Speech: soft, normal rhythm  Mood: \"good\"  Affect: constricted but brighter  Thought Process: concrete  Thought Content: denies delusions or paranoia  Risk Potential: denies suicidal ideation, plan, or intent. Denies homicidal ideation  Perceptions: denies auditory hallucinations, denies visual hallucinations,   Sensorium: Oriented to person, place, time, and situation  Cognition: cognitive ability appears intact but was not quantitatively tested  Consciousness: alert and awake  Attention: intact, able to focus without difficulty  Insight: limited  Judgement: limited        Lab Results: I have reviewed the following results:      Administrative Statements     Counseling / Coordination of Care:   Patient's progress discussed with staff in treatment team meeting.  Medication changes reviewed with staff in treatment team meeting..    Dustin Mcallister MD 03/27/25  "

## 2025-03-28 PROCEDURE — 99232 SBSQ HOSP IP/OBS MODERATE 35: CPT | Performed by: PSYCHIATRY & NEUROLOGY

## 2025-03-28 RX ADMIN — CYANOCOBALAMIN TAB 1000 MCG 1000 MCG: 1000 TAB at 08:01

## 2025-03-28 RX ADMIN — SERTRALINE HYDROCHLORIDE 150 MG: 100 TABLET ORAL at 08:01

## 2025-03-28 RX ADMIN — ATORVASTATIN CALCIUM 40 MG: 40 TABLET, FILM COATED ORAL at 17:08

## 2025-03-28 RX ADMIN — Medication 2500 UNITS: at 08:01

## 2025-03-28 RX ADMIN — FAMOTIDINE 20 MG: 20 TABLET ORAL at 17:08

## 2025-03-28 RX ADMIN — LEVOTHYROXINE SODIUM 37.5 MCG: 0.07 TABLET ORAL at 06:35

## 2025-03-28 RX ADMIN — SENNOSIDES AND DOCUSATE SODIUM 2 TABLET: 50; 8.6 TABLET ORAL at 17:08

## 2025-03-28 RX ADMIN — FAMOTIDINE 20 MG: 20 TABLET ORAL at 08:01

## 2025-03-28 RX ADMIN — ARIPIPRAZOLE 5 MG: 5 TABLET ORAL at 08:01

## 2025-03-28 NOTE — SOCIAL WORK
Cm met with Pt and reported to the [facility] that Monday is good for admission.   They confirmed and then reported that Pt will be in the [facility] location because they will continue his SSA. They reported that Pt's rent will be 1,100 and security deposit is 1,100. Pt transferred the necessary rent to his checking account to have for Monday.  [facility] reported they will assist Pt with getting his car and the belongings inside. There also reported they will assist Pt with getting insurance and registration for the car.

## 2025-03-28 NOTE — PROGRESS NOTES
03/28/25 0839   Team Meeting   Meeting Type Daily Rounds   Team Members Present   Team Members Present Physician;Nurse;   Physician Team Member Ary   [facility]   Care Management Team Member Noa   Patient/Family Present   Patient Present No   Patient's Family Present No     Pt is medication and meal compliant. Pt denies SI/HI/AVH. Pt is cooperative and calm. Pt is looking forward to discharge Monday.

## 2025-03-28 NOTE — PLAN OF CARE
Problem: Depression  Goal: Treatment Goal: Demonstrate behavioral control of depressive symptoms, verbalize feelings of improved mood/affect, and adopt new coping skills prior to discharge  Outcome: Progressing  Goal: Verbalize thoughts and feelings  Description: Interventions:  - Assess and re-assess patient's level of risk   - Engage patient in 1:1 interactions, daily, for a minimum of 15 minutes   - Encourage patient to express feelings, fears, frustrations, hopes   Outcome: Progressing  Goal: Refrain from harming self  Description: Interventions:  - Monitor patient closely, per order   - Supervise medication ingestion, monitor effects and side effects   Outcome: Progressing  Goal: Refrain from isolation  Description: Interventions:  - Develop a trusting relationship   - Encourage socialization   Outcome: Progressing     Problem: Anxiety  Goal: Anxiety is at manageable level  Description: Interventions:  - Assess and monitor patient's anxiety level.   - Monitor for signs and symptoms (heart palpitations, chest pain, shortness of breath, headaches, nausea, feeling jumpy, restlessness, irritable, apprehensive).   - Collaborate with interdisciplinary team and initiate plan and interventions as ordered.  - [place] patient to unit/surroundings  - Explain treatment plan  - Encourage participation in care  - Encourage verbalization of concerns/fears  - Identify coping mechanisms  - Assist in developing anxiety-reducing skills  - Administer/offer alternative therapies  - Limit or eliminate stimulants  Outcome: Progressing     Problem: DISCHARGE PLANNING - CARE MANAGEMENT  Goal: Discharge to post-acute care or home with appropriate resources  Description: INTERVENTIONS:  - Conduct assessment to determine patient/family and health care team treatment goals, and need for post-acute services based on payer coverage, community resources, and patient preferences, and barriers to discharge  - Address psychosocial, clinical,  and financial barriers to discharge as identified in assessment in conjunction with the patient/family and health care team  - Arrange appropriate level of post-acute services according to patient’s   needs and preference and payer coverage in collaboration with the physician and health care team  - Communicate with and update the patient/family, physician, and health care team regarding progress on the discharge plan  - Arrange appropriate transportation to post-acute venues  Outcome: Progressing     Problem: Knowledge Deficit  Goal: Patient/family/caregiver demonstrates understanding of disease process, treatment plan, medications, and discharge instructions  Description: Complete learning assessment and assess knowledge base.  Interventions:  - Provide teaching at level of understanding  - Provide teaching via preferred learning methods  Outcome: Progressing     Problem: SLEEP DISTURBANCE  Goal: Will exhibit normal sleeping pattern  Description: Interventions:  -  Assess the patients sleep pattern, noting recent changes  - Administer medication as ordered  - Decrease environmental stimuli, including noise, as appropriate during the night  - Encourage the patient to actively participate in unit groups and or exercise during the day to enhance ability to achieve adequate sleep at night  - Assess the patient, in the morning, encouraging a description of sleep experience  Outcome: Progressing

## 2025-03-28 NOTE — NURSING NOTE
Denies symptoms. Cooperative. Compliant with medications. Looking forward to discharge on Monday.

## 2025-03-28 NOTE — PROGRESS NOTES
Progress Note - Behavioral Health   Name: Franco Roberson 39 y.o. male I MRN: [id number]  Unit/Bed#: -02 I Date of Admission: 5/14/2024   Date of Service: 3/28/2025 I Hospital Day: 318     Assessment & Plan  MDD (major depressive disorder), recurrent severe, without psychosis (HCC)    D/C 3/31/25 to personal care home    Patient continues to do well and the plan is to continue with the current treatment plan as mentioned below with no changes.    Patient continues to be pending group home placement. Otherwise no clinical significant change.   continues to look for group home/personal-care home placement however this can be significantly impacted by patient no longer having money in the [employer] account and his bank account being over drawn.  Patient is quite altruistic in his desire not to have his friend go to senior care however continues to display poor to limited insight regarding his ability to advocate for himself and organize/prioritize his own needs given his own difficult social situation. Would be at significant risk of harm if not discharged to a structured setting with support.    Medication regimen as follows, no changes as of 3/28/2025:  Abilify 5 mg daily as mood adjunct   Zoloft 150 mg daily for depression and anxiety  Continue to encourage participation in group therapy, milieu therapy and occupational therapy.  Continue to assess for side effects of medications.  Continue collaboration with PATRICIA for medical co-morbidities as indicated.  Continue discussion with CM/SW to assist with obtaining collateral, disposition planning, and the implementation of patient-centered individualized plan of care.  Continue frequent safety checks and vitals per unit protocol.    Risks, benefits and possible side effects of Medications: Risks, benefits, and possible side effects of medications have previously been explained. No new medications at this time.      Legal status: 201    Disposition: to be determined,  "pending SOAR application for potential group home placement. OT Cognitive Evaluation completed: \"Pt would benefit from discharge to a supportive environment that can provide checks for safety and compliance with IADLs. \"   Although patient's mood has stabilized, they are currently awaiting group home placement.  Their ability to recognize safety hazards take medications and participate in IADLs are significantly impaired by their cognitive deficits compounded by their chronic mental health needs and would be at risk for significant decompensation without the structure and support of a group home setting.      Orders from past 72 hours:    ARIPiprazole (ABILIFY) 5 mg tablet; Take 1 tablet (5 mg total) by mouth daily    sertraline (ZOLOFT) 50 mg tablet; Take 3 tablets (150 mg total) by mouth daily    Autism spectrum disorder  Continue supportive care    No associated orders from this encounter found during lookback period of 72 hours.    Hyperlipidemia  - Lipitor 40 mg     Current medications:  Current Facility-Administered Medications   Medication Dose Route Frequency Provider Last Rate    acetaminophen  650 mg Oral Q6H PRN Basilio Brown MD      acetaminophen  650 mg Oral Q4H PRN Basilio Brown MD      acetaminophen  975 mg Oral Q6H PRN Basilio Brown MD      aluminum-magnesium hydroxide-simethicone  30 mL Oral Q4H PRN Basilio Brown MD      ARIPiprazole  5 mg Oral Daily Basilio Panda MD      Artificial Tears  1 drop Both Eyes Q3H PRN Basilio Brown MD      atorvastatin  40 mg Oral Daily With Dinner WALLY Baptiste      benztropine  1 mg Intramuscular Q4H PRN Max 6/day Basilio Brown MD      benztropine  1 mg Oral Q4H PRN Max 6/day Basilio Brown MD      Cholecalciferol  2,500 Units Oral Daily Dustin Mcallister MD      cyanocobalamin  1,000 mcg Oral Daily WALLY Baptiste      Diclofenac Sodium  2 g Topical 4x Daily PRN WALLY Baptiste   "    hydrOXYzine HCL  50 mg Oral Q6H PRN Max 4/day Basilio Brown MD      Or    diphenhydrAMINE  50 mg Intramuscular Q6H PRN Basilio Brown MD      diphenhydrAMINE-zinc acetate   Topical Daily PRN Raj Guerrero MD      famotidine  20 mg Oral BID Laura WALLY Olmos      hydrOXYzine HCL  100 mg Oral Q6H PRN Max 4/day Basilio Brown MD      Or    LORazepam  2 mg Intramuscular Q6H PRN Basilio Brown MD      hydrOXYzine HCL  25 mg Oral Q6H PRN Max 4/day Basilio Brown MD      levothyroxine  37.5 mcg Oral Early Morning WALLY Baptiste      melatonin  6 mg Oral HS PRN WALLY Gomez      methocarbamol  500 mg Oral Q6H PRN WALLY Baptiste      OLANZapine  5 mg Oral Q4H PRN Max 3/day Basilio Brown MD      Or    OLANZapine  2.5 mg Intramuscular Q4H PRN Max 3/day Basilio Brown MD      OLANZapine  5 mg Oral Q3H PRN Max 3/day Basilio Brown MD      Or    OLANZapine  5 mg Intramuscular Q3H PRN Max 3/day Basilio Brown MD      OLANZapine  2.5 mg Oral Q4H PRN Max 6/day Basilio Brown MD      polyethylene glycol  17 g Oral Daily PRN Basilio Brown MD      propranolol  10 mg Oral Q8H PRN Basilio Brown MD      senna-docusate sodium  2 tablet Oral After Dinner Dustin Mcallister MD      sertraline  150 mg Oral Daily WALLY Gomez      traZODone  50 mg Oral HS PRN Basilio Brown MD          Risks/Benefits of Treatment:     Risks, benefits, and possible side effects of medications explained to patient and patient verbalizes understanding and agreement for treatment.    Progress Toward Goals: progressing, mood is stabilizing, depression is improving, discharge planning, placement pending    Treatment Planning:     All current active medications have been reviewed.  Continue to monitor response to treatment and assess for potential side effects of medications.  Encourage group therapy, milieu therapy and occupational  "therapy  Collaboration with medical service for medical comorbidities as indicated.  Behavioral Health checks for safety monitoring.    Estimated Discharge Day: 4/9/2025       Subjective     Patient was visited on unit for continuing care; chart reviewed and discussed with multidisciplinary treatment team.  On approach, the patient was calm and cooperative. Denied any changes in mood, appetite, and energy level. No problem initiating and maintaining sleep.  Denied A/VH currently.  Denied SI/HI, intent or plan upon direct inquiry at this time.  Patient aware that discharge is on Monday.  He states that he is looking forward to it and although was little bit anxious is excited overall.  He has no other complaints or concerns at this time    Patient continues to be visible in the milieu and interacts with staff and peers. No reports of aggression or self-injurious behavior on unit. No PRN medications used in the past 24 hours.    Patient accepted all offered medications and no adverse effects of medications noted or reported.      Behavior over the last 24 hours: unchanged.        Sleep: normal  Appetite: normal  Medication side effects: No  ROS: review of systems as noted above in HPI/Subjective report, all other systems are negative    Objective :  Temp:  [97.6 °F (36.4 °C)-98 °F (36.7 °C)] 97.6 °F (36.4 °C)  HR:  [68-85] 68  BP: (138-140)/(79-87) 140/87  Resp:  [16-17] 16  SpO2:  [96 %] 96 %  O2 Device: None (Room air)    Temp:  [97.6 °F (36.4 °C)-98 °F (36.7 °C)] 97.6 °F (36.4 °C)  HR:  [68-85] 68  BP: (138-140)/(79-87) 140/87  Resp:  [16-17] 16  SpO2:  [96 %] 96 %  O2 Device: None (Room air)    Mental Status Evaluation:    Appearance: casually dressed, consistent with stated age  Motor: no psychomotor retardation, no gait abnormalities  Behavior: cooperative, answers questions appropriately  Speech: soft, normal rhythm  Mood: \"good\"  Affect: euthymic  Thought Process: concrete  Thought Content: denies " delusions  Risk Potential: denies suicidal ideation, plan, or intent. Denies homicidal ideation  Perceptions: denies auditory hallucinations, denies visual hallucinations,   Sensorium: Oriented to person, place, time, and situation  Cognition: cognitive ability appears intact but was not quantitatively tested  Consciousness: alert and awake  Attention: intact, able to focus without difficulty  Insight: limited  Judgement: limited      Lab Results: I have reviewed the following results:      Administrative Statements     Counseling / Coordination of Care:   Patient's progress discussed with staff in treatment team meeting.  Medication changes reviewed with staff in treatment team meeting..    Dustin Mcallister MD 03/28/25

## 2025-03-28 NOTE — NURSING NOTE
Patient is calm and cooperative upon approach. Patient is visible on unit. Patient isolative to self. Patient denies SI/HI/aH/vH. Patient is compliant with meds and meals.

## 2025-03-28 NOTE — ASSESSMENT & PLAN NOTE
"  D/C 3/31/25 to personal care home    Patient continues to do well and the plan is to continue with the current treatment plan as mentioned below with no changes.    Patient continues to be pending group home placement. Otherwise no clinical significant change.   continues to look for group home/personal-care home placement however this can be significantly impacted by patient no longer having money in the [employer] account and his bank account being over drawn.  Patient is quite altruistic in his desire not to have his friend go to [facility] however continues to display poor to limited insight regarding his ability to advocate for himself and organize/prioritize his own needs given his own difficult social situation. Would be at significant risk of harm if not discharged to a structured setting with support.    Medication regimen as follows, no changes as of 3/28/2025:  Abilify 5 mg daily as mood adjunct   Zoloft 150 mg daily for depression and anxiety  Continue to encourage participation in group therapy, milieu therapy and occupational therapy.  Continue to assess for side effects of medications.  Continue collaboration with PATRICIA for medical co-morbidities as indicated.  Continue discussion with CM/SW to assist with obtaining collateral, disposition planning, and the implementation of patient-centered individualized plan of care.  Continue frequent safety checks and vitals per unit protocol.    Risks, benefits and possible side effects of Medications: Risks, benefits, and possible side effects of medications have previously been explained. No new medications at this time.      Legal status: 201    Disposition: to be determined, pending SOAR application for potential group home placement. OT Cognitive Evaluation completed: \"Pt would benefit from discharge to a supportive environment that can provide checks for safety and compliance with IADLs. \"   Although patient's mood has stabilized, they are currently " awaiting group home placement.  Their ability to recognize safety hazards take medications and participate in IADLs are significantly impaired by their cognitive deficits compounded by their chronic mental health needs and would be at risk for significant decompensation without the structure and support of a group home setting.      Orders from past 72 hours:    ARIPiprazole (ABILIFY) 5 mg tablet; Take 1 tablet (5 mg total) by mouth daily    sertraline (ZOLOFT) 50 mg tablet; Take 3 tablets (150 mg total) by mouth daily

## 2025-03-29 PROCEDURE — 99232 SBSQ HOSP IP/OBS MODERATE 35: CPT | Performed by: STUDENT IN AN ORGANIZED HEALTH CARE EDUCATION/TRAINING PROGRAM

## 2025-03-29 RX ADMIN — ATORVASTATIN CALCIUM 40 MG: 40 TABLET, FILM COATED ORAL at 17:19

## 2025-03-29 RX ADMIN — CYANOCOBALAMIN TAB 1000 MCG 1000 MCG: 1000 TAB at 08:46

## 2025-03-29 RX ADMIN — SENNOSIDES AND DOCUSATE SODIUM 2 TABLET: 50; 8.6 TABLET ORAL at 17:19

## 2025-03-29 RX ADMIN — SERTRALINE HYDROCHLORIDE 150 MG: 100 TABLET ORAL at 08:46

## 2025-03-29 RX ADMIN — Medication 2500 UNITS: at 08:46

## 2025-03-29 RX ADMIN — FAMOTIDINE 20 MG: 20 TABLET ORAL at 17:19

## 2025-03-29 RX ADMIN — FAMOTIDINE 20 MG: 20 TABLET ORAL at 08:46

## 2025-03-29 RX ADMIN — ARIPIPRAZOLE 5 MG: 5 TABLET ORAL at 08:46

## 2025-03-29 RX ADMIN — LEVOTHYROXINE SODIUM 37.5 MCG: 0.07 TABLET ORAL at 06:28

## 2025-03-29 NOTE — NURSING NOTE
Patient is visible on unit, isolative to self. Patient calm and cooperative upon approach. Patient denies SI/HI/AH/vH. Patient is compliant with meds and meals.

## 2025-03-29 NOTE — ASSESSMENT & PLAN NOTE
"  D/C 3/31/25 to personal care home    Patient continues to do well and the plan is to continue with the current treatment plan as mentioned below with no changes.    Patient continues to be pending group home placement. Otherwise no clinical significant change.   continues to look for group home/personal-care home placement however this can be significantly impacted by patient no longer having money in the [employer] account and his bank account being over drawn.  Patient is quite altruistic in his desire not to have his friend go to intermediate however continues to display poor to limited insight regarding his ability to advocate for himself and organize/prioritize his own needs given his own difficult social situation. Would be at significant risk of harm if not discharged to a structured setting with support.    Medication regimen as follows, no changes as of 3/28/2025:  Abilify 5 mg daily as mood adjunct   Zoloft 150 mg daily for depression and anxiety  Continue to encourage participation in group therapy, milieu therapy and occupational therapy.  Continue to assess for side effects of medications.  Continue collaboration with PATRICIA for medical co-morbidities as indicated.  Continue discussion with CM/SW to assist with obtaining collateral, disposition planning, and the implementation of patient-centered individualized plan of care.  Continue frequent safety checks and vitals per unit protocol.    Risks, benefits and possible side effects of Medications: Risks, benefits, and possible side effects of medications have previously been explained. No new medications at this time.      Legal status: 201    Disposition: to be determined, pending SOAR application for potential group home placement. OT Cognitive Evaluation completed: \"Pt would benefit from discharge to a supportive environment that can provide checks for safety and compliance with IADLs. \"   Although patient's mood has stabilized, they are currently " awaiting group home placement.  Their ability to recognize safety hazards take medications and participate in IADLs are significantly impaired by their cognitive deficits compounded by their chronic mental health needs and would be at risk for significant decompensation without the structure and support of a group home setting.      No associated orders from this encounter found during lookback period of 72 hours.

## 2025-03-29 NOTE — NURSING NOTE
"Pt is calm, cooperative, isolating to self in room or is seen in hallway pacing. Pt denies SI, HI, AH, VH and pain at this time. Pt states he is \"a little anxious/nervous\" for discharge but is excited. Pt attends groups, states he is doing his ADLs and is medication/ meal complaint. Pt denies any unmet needs and remains on q15 min checks for safety.  "

## 2025-03-29 NOTE — PLAN OF CARE
Problem: Ineffective Coping  Goal: Participates in unit activities  Description: Interventions:  - Provide therapeutic environment   - Provide required programming   - Redirect inappropriate behaviors   Outcome: Progressing     Problem: Depression  Goal: Treatment Goal: Demonstrate behavioral control of depressive symptoms, verbalize feelings of improved mood/affect, and adopt new coping skills prior to discharge  Outcome: Progressing  Goal: Verbalize thoughts and feelings  Description: Interventions:  - Assess and re-assess patient's level of risk   - Engage patient in 1:1 interactions, daily, for a minimum of 15 minutes   - Encourage patient to express feelings, fears, frustrations, hopes   Outcome: Progressing  Goal: Refrain from harming self  Description: Interventions:  - Monitor patient closely, per order   - Supervise medication ingestion, monitor effects and side effects   Outcome: Progressing  Goal: Refrain from isolation  Description: Interventions:  - Develop a trusting relationship   - Encourage socialization   Outcome: Progressing     Problem: Anxiety  Goal: Anxiety is at manageable level  Description: Interventions:  - Assess and monitor patient's anxiety level.   - Monitor for signs and symptoms (heart palpitations, chest pain, shortness of breath, headaches, nausea, feeling jumpy, restlessness, irritable, apprehensive).   - Collaborate with interdisciplinary team and initiate plan and interventions as ordered.  - [place] patient to unit/surroundings  - Explain treatment plan  - Encourage participation in care  - Encourage verbalization of concerns/fears  - Identify coping mechanisms  - Assist in developing anxiety-reducing skills  - Administer/offer alternative therapies  - Limit or eliminate stimulants  Outcome: Progressing     Problem: DISCHARGE PLANNING - CARE MANAGEMENT  Goal: Discharge to post-acute care or home with appropriate resources  Description: INTERVENTIONS:  - Conduct assessment to  determine patient/family and health care team treatment goals, and need for post-acute services based on payer coverage, community resources, and patient preferences, and barriers to discharge  - Address psychosocial, clinical, and financial barriers to discharge as identified in assessment in conjunction with the patient/family and health care team  - Arrange appropriate level of post-acute services according to patient’s   needs and preference and payer coverage in collaboration with the physician and health care team  - Communicate with and update the patient/family, physician, and health care team regarding progress on the discharge plan  - Arrange appropriate transportation to post-acute venues  Outcome: Progressing     Problem: Knowledge Deficit  Goal: Patient/family/caregiver demonstrates understanding of disease process, treatment plan, medications, and discharge instructions  Description: Complete learning assessment and assess knowledge base.  Interventions:  - Provide teaching at level of understanding  - Provide teaching via preferred learning methods  Outcome: Progressing     Problem: SLEEP DISTURBANCE  Goal: Will exhibit normal sleeping pattern  Description: Interventions:  -  Assess the patients sleep pattern, noting recent changes  - Administer medication as ordered  - Decrease environmental stimuli, including noise, as appropriate during the night  - Encourage the patient to actively participate in unit groups and or exercise during the day to enhance ability to achieve adequate sleep at night  - Assess the patient, in the morning, encouraging a description of sleep experience  Outcome: Progressing

## 2025-03-29 NOTE — PROGRESS NOTES
Progress Note - Behavioral Health   Name: Franco Roberson 39 y.o. male I MRN: [id number]  Unit/Bed#: -02 I Date of Admission: 5/14/2024   Date of Service: 3/29/2025 I Hospital Day: 319    Assessment & Plan  MDD (major depressive disorder), recurrent severe, without psychosis (HCC)    D/C 3/31/25 to personal care home    Patient continues to do well and the plan is to continue with the current treatment plan as mentioned below with no changes.    Patient continues to be pending group home placement. Otherwise no clinical significant change.   continues to look for group home/personal-care home placement however this can be significantly impacted by patient no longer having money in the [employer] account and his bank account being over drawn.  Patient is quite altruistic in his desire not to have his friend go to custodial however continues to display poor to limited insight regarding his ability to advocate for himself and organize/prioritize his own needs given his own difficult social situation. Would be at significant risk of harm if not discharged to a structured setting with support.    Medication regimen as follows, no changes as of 3/28/2025:  Abilify 5 mg daily as mood adjunct   Zoloft 150 mg daily for depression and anxiety  Continue to encourage participation in group therapy, milieu therapy and occupational therapy.  Continue to assess for side effects of medications.  Continue collaboration with PATRICIA for medical co-morbidities as indicated.  Continue discussion with CM/SW to assist with obtaining collateral, disposition planning, and the implementation of patient-centered individualized plan of care.  Continue frequent safety checks and vitals per unit protocol.    Risks, benefits and possible side effects of Medications: Risks, benefits, and possible side effects of medications have previously been explained. No new medications at this time.      Legal status: 201    Disposition: to be determined,  "pending SOAR application for potential group home placement. OT Cognitive Evaluation completed: \"Pt would benefit from discharge to a supportive environment that can provide checks for safety and compliance with IADLs. \"   Although patient's mood has stabilized, they are currently awaiting group home placement.  Their ability to recognize safety hazards take medications and participate in IADLs are significantly impaired by their cognitive deficits compounded by their chronic mental health needs and would be at risk for significant decompensation without the structure and support of a group home setting.      No associated orders from this encounter found during lookback period of 72 hours.    Autism spectrum disorder  Continue supportive care    No associated orders from this encounter found during lookback period of 72 hours.    Hyperlipidemia  - Lipitor 40 mg     Current medications:  Current Facility-Administered Medications   Medication Dose Route Frequency Provider Last Rate    acetaminophen  650 mg Oral Q6H PRN Basilio Brown MD      acetaminophen  650 mg Oral Q4H PRN Basilio Brown MD      acetaminophen  975 mg Oral Q6H PRN Basilio Brown MD      aluminum-magnesium hydroxide-simethicone  30 mL Oral Q4H PRN Basilio Brown MD      ARIPiprazole  5 mg Oral Daily Basilio Panda MD      Artificial Tears  1 drop Both Eyes Q3H PRN Basilio Brown MD      atorvastatin  40 mg Oral Daily With Dinner WALLY Baptiste      benztropine  1 mg Intramuscular Q4H PRN Max 6/day Basilio Brown MD      benztropine  1 mg Oral Q4H PRN Max 6/day Basilio Brown MD      Cholecalciferol  2,500 Units Oral Daily Dustin Mcallister MD      cyanocobalamin  1,000 mcg Oral Daily WALLY Baptiste      Diclofenac Sodium  2 g Topical 4x Daily PRN WALLY Baptiste      hydrOXYzine HCL  50 mg Oral Q6H PRN Max 4/day Basilio Brown MD      Or    diphenhydrAMINE  50 mg " Intramuscular Q6H PRN Basilio Brown MD      diphenhydrAMINE-zinc acetate   Topical Daily PRN Raj Guerrero MD      famotidine  20 mg Oral BID WALLY Baptiste      hydrOXYzine HCL  100 mg Oral Q6H PRN Max 4/day Basilio Brown MD      Or    LORazepam  2 mg Intramuscular Q6H PRN Basilio Brown MD      hydrOXYzine HCL  25 mg Oral Q6H PRN Max 4/day Basilio rBown MD      levothyroxine  37.5 mcg Oral Early Morning WALLY Baptiste      melatonin  6 mg Oral HS PRN WALLY Gomez      methocarbamol  500 mg Oral Q6H PRN WALLY Baptiste      OLANZapine  5 mg Oral Q4H PRN Max 3/day Basilio Brown MD      Or    OLANZapine  2.5 mg Intramuscular Q4H PRN Max 3/day Basilio Brown MD      OLANZapine  5 mg Oral Q3H PRN Max 3/day Basilio Brown MD      Or    OLANZapine  5 mg Intramuscular Q3H PRN Max 3/day Basilio Brown MD      OLANZapine  2.5 mg Oral Q4H PRN Max 6/day Basilio Brown MD      polyethylene glycol  17 g Oral Daily PRN Basilio Brown MD      propranolol  10 mg Oral Q8H PRN Basilio Brown MD      senna-docusate sodium  2 tablet Oral After Dinner Dustin Mcallister MD      sertraline  150 mg Oral Daily WALLY Gomez      traZODone  50 mg Oral HS PRN Basilio Brown MD          Risks/Benefits of Treatment:     Risks, benefits, and possible side effects of medications explained to patient and patient verbalizes understanding and agreement for treatment.    Progress Toward Goals: improving    Treatment Planning:     All current active medications have been reviewed.  Continue to monitor response to treatment and assess for potential side effects of medications.  Encourage group therapy, milieu therapy and occupational therapy  Collaboration with medical service for medical comorbidities as indicated.  Behavioral Health checks for safety monitoring.  Estimated Discharge Day: 4/9/2025   Legal Status :  Voluntary 201 commitment.    Subjective     Behavior over the last 24 hours: unchanged.    Per nursing report, patient continues to do well.  He remains cooperative and appropriate on the unit and remains medication compliant.  No acute behavioral episodes overnight.    On approach, the patient is pleasant and cooperative.  He reports that his mood is overall good and he does feel that medications have been helpful with depression.  He reports tolerating his medications well without any adverse effects.  He reports looking forward to transitioning to group home.  He denies any SI, HI, or AVH.  He denies any questions or concerns at this time.    Sleep: normal  Appetite: normal  Medication side effects: No  ROS: review of systems as noted above in HPI/Subjective report, all other systems are negative    Objective :  Temp:  [97.2 °F (36.2 °C)-97.6 °F (36.4 °C)] 97.2 °F (36.2 °C)  HR:  [79-85] 85  BP: (101-104)/(61-66) 101/61  Resp:  [16-17] 17  SpO2:  [96 %-97 %] 96 %  O2 Device: None (Room air)    Temp:  [97.2 °F (36.2 °C)-97.6 °F (36.4 °C)] 97.2 °F (36.2 °C)  HR:  [79-85] 85  BP: (101-104)/(61-66) 101/61  Resp:  [16-17] 17  SpO2:  [96 %-97 %] 96 %  O2 Device: None (Room air)    Mental Status Evaluation:    Appearance:  disheveled, marginal hygiene   Behavior:  pleasant, cooperative, calm   Speech:  normal rate, normal volume   Mood:  euthymic   Affect:  constricted   Thought Process:  organized, goal directed, logical   Thought Content:  no overt delusions   Perceptual Disturbances: no auditory hallucinations, no visual hallucinations   Risk Potential: Suicidal Ideation -  None  Homicidal Ideation -  None  Potential for Aggression - No   Sensorium:  oriented to person, place, and time/date   Memory:  recent and remote memory grossly intact   Consciousness:  alert and awake   Attention/Concentration: attention span and concentration are age appropriate   Insight:  fair   Judgment: fair   Gait/Station: normal  gait/station, normal balance   Motor Activity: no abnormal movements       Lab Results: I have reviewed the following results:  Results from the past 24 hours: No results found for this or any previous visit (from the past 24 hours).    Administrative Statements     Counseling / Coordination of Care:   Patient's progress discussed with staff in treatment team meeting.  Medication changes reviewed with staff in treatment team meeting..    Basilio Panda MD 03/29/25

## 2025-03-30 PROCEDURE — 99232 SBSQ HOSP IP/OBS MODERATE 35: CPT | Performed by: STUDENT IN AN ORGANIZED HEALTH CARE EDUCATION/TRAINING PROGRAM

## 2025-03-30 RX ADMIN — FAMOTIDINE 20 MG: 20 TABLET ORAL at 17:05

## 2025-03-30 RX ADMIN — ARIPIPRAZOLE 5 MG: 5 TABLET ORAL at 08:28

## 2025-03-30 RX ADMIN — SERTRALINE HYDROCHLORIDE 150 MG: 100 TABLET ORAL at 08:28

## 2025-03-30 RX ADMIN — LEVOTHYROXINE SODIUM 37.5 MCG: 0.07 TABLET ORAL at 06:25

## 2025-03-30 RX ADMIN — ATORVASTATIN CALCIUM 40 MG: 40 TABLET, FILM COATED ORAL at 17:05

## 2025-03-30 RX ADMIN — CYANOCOBALAMIN TAB 1000 MCG 1000 MCG: 1000 TAB at 08:28

## 2025-03-30 RX ADMIN — SENNOSIDES AND DOCUSATE SODIUM 2 TABLET: 50; 8.6 TABLET ORAL at 17:05

## 2025-03-30 RX ADMIN — Medication 2500 UNITS: at 08:28

## 2025-03-30 RX ADMIN — FAMOTIDINE 20 MG: 20 TABLET ORAL at 08:28

## 2025-03-30 NOTE — PROGRESS NOTES
Progress Note - Behavioral Health   Name: Franco Roberson 39 y.o. male I MRN: [id number]  Unit/Bed#: -02 I Date of Admission: 5/14/2024   Date of Service: 3/30/2025 I Hospital Day: 320    Assessment & Plan  MDD (major depressive disorder), recurrent severe, without psychosis (HCC)    D/C 3/31/25 to personal care home    Patient continues to do well and the plan is to continue with the current treatment plan as mentioned below with no changes.    Patient continues to be pending group home placement. Otherwise no clinical significant change.   continues to look for group home/personal-care home placement however this can be significantly impacted by patient no longer having money in the [employer] account and his bank account being over drawn.  Patient is quite altruistic in his desire not to have his friend go to long-term however continues to display poor to limited insight regarding his ability to advocate for himself and organize/prioritize his own needs given his own difficult social situation. Would be at significant risk of harm if not discharged to a structured setting with support.    Medication regimen as follows, no changes as of 3/28/2025:  Abilify 5 mg daily as mood adjunct   Zoloft 150 mg daily for depression and anxiety  Continue to encourage participation in group therapy, milieu therapy and occupational therapy.  Continue to assess for side effects of medications.  Continue collaboration with PATRICIA for medical co-morbidities as indicated.  Continue discussion with CM/SW to assist with obtaining collateral, disposition planning, and the implementation of patient-centered individualized plan of care.  Continue frequent safety checks and vitals per unit protocol.    Risks, benefits and possible side effects of Medications: Risks, benefits, and possible side effects of medications have previously been explained. No new medications at this time.      Legal status: 201    Disposition: to be determined,  "pending SOAR application for potential group home placement. OT Cognitive Evaluation completed: \"Pt would benefit from discharge to a supportive environment that can provide checks for safety and compliance with IADLs. \"   Although patient's mood has stabilized, they are currently awaiting group home placement.  Their ability to recognize safety hazards take medications and participate in IADLs are significantly impaired by their cognitive deficits compounded by their chronic mental health needs and would be at risk for significant decompensation without the structure and support of a group home setting.      No associated orders from this encounter found during lookback period of 72 hours.    Autism spectrum disorder  Continue supportive care    No associated orders from this encounter found during lookback period of 72 hours.    Hyperlipidemia  - Lipitor 40 mg     Current medications:  Current Facility-Administered Medications   Medication Dose Route Frequency Provider Last Rate    acetaminophen  650 mg Oral Q6H PRN Basilio Brown MD      acetaminophen  650 mg Oral Q4H PRN Basilio Brown MD      acetaminophen  975 mg Oral Q6H PRN Basilio Brown MD      aluminum-magnesium hydroxide-simethicone  30 mL Oral Q4H PRN Basilio Brown MD      ARIPiprazole  5 mg Oral Daily Basilio Panda MD      Artificial Tears  1 drop Both Eyes Q3H PRN Basilio Brown MD      atorvastatin  40 mg Oral Daily With Dinner WALLY Baptiste      benztropine  1 mg Intramuscular Q4H PRN Max 6/day Basilio Brown MD      benztropine  1 mg Oral Q4H PRN Max 6/day Basilio Brown MD      Cholecalciferol  2,500 Units Oral Daily Dustin Mcallister MD      cyanocobalamin  1,000 mcg Oral Daily WALLY Baptiste      Diclofenac Sodium  2 g Topical 4x Daily PRN WALLY Baptiste      hydrOXYzine HCL  50 mg Oral Q6H PRN Max 4/day Basilio Brown MD      Or    diphenhydrAMINE  50 mg " Intramuscular Q6H PRN Basilio Brown MD      diphenhydrAMINE-zinc acetate   Topical Daily PRN Raj Guerrero MD      famotidine  20 mg Oral BID WALLY Baptiste      hydrOXYzine HCL  100 mg Oral Q6H PRN Max 4/day Basilio Brown MD      Or    LORazepam  2 mg Intramuscular Q6H PRN Basilio Brown MD      hydrOXYzine HCL  25 mg Oral Q6H PRN Max 4/day Basilio Brown MD      levothyroxine  37.5 mcg Oral Early Morning WALLY Baptiste      melatonin  6 mg Oral HS PRN WALLY Gomez      methocarbamol  500 mg Oral Q6H PRN WALLY Baptiste      OLANZapine  5 mg Oral Q4H PRN Max 3/day Basilio Brown MD      Or    OLANZapine  2.5 mg Intramuscular Q4H PRN Max 3/day Basilio Brown MD      OLANZapine  5 mg Oral Q3H PRN Max 3/day Basilio Brown MD      Or    OLANZapine  5 mg Intramuscular Q3H PRN Max 3/day Basilio Brown MD      OLANZapine  2.5 mg Oral Q4H PRN Max 6/day Basilio Brown MD      polyethylene glycol  17 g Oral Daily PRN Basilio Brown MD      propranolol  10 mg Oral Q8H PRN Basilio Brown MD      senna-docusate sodium  2 tablet Oral After Dinner Dustin Mcallister MD      sertraline  150 mg Oral Daily WALLY Gomez      traZODone  50 mg Oral HS PRN Basilio Brown MD          Risks/Benefits of Treatment:     Risks, benefits, and possible side effects of medications explained to patient and patient verbalizes understanding and agreement for treatment.    Progress Toward Goals: progressing    Treatment Planning:     All current active medications have been reviewed.  Continue to monitor response to treatment and assess for potential side effects of medications.  Encourage group therapy, milieu therapy and occupational therapy  Collaboration with medical service for medical comorbidities as indicated.  Behavioral Health checks for safety monitoring.  Estimated Discharge Day: 4/9/2025   Legal Status :  Voluntary 201 commitment.    Subjective     Behavior over the last 24 hours: unchanged.    Per nursing report, patient continues to be pleasant and cooperative on the unit.  He has had no acute behavioral issues.  He remains medication compliant.  He is pending for discharge to group home tomorrow.    On approach, the patient is bright and cheerful, he reports that his mood is good.  He denies feeling depressed, though does report feeling overall anxious about transitioning to group home.  The patient reports tolerating medications and feels that these have been helpful.  He denies any SI, HI, or AVH.  He remains organized and logical throughout interview.  He denies any questions or concerns at this time.    Sleep: normal  Appetite: normal  Medication side effects: No  ROS: review of systems as noted above in HPI/Subjective report, all other systems are negative    Objective :  Temp:  [96.6 °F (35.9 °C)-97.6 °F (36.4 °C)] 96.6 °F (35.9 °C)  HR:  [75-78] 75  BP: (102-104)/(57-68) 102/68  Resp:  [16] 16  SpO2:  [96 %-97 %] 97 %  O2 Device: None (Room air)    Temp:  [96.6 °F (35.9 °C)-97.6 °F (36.4 °C)] 96.6 °F (35.9 °C)  HR:  [75-78] 75  BP: (102-104)/(57-68) 102/68  Resp:  [16] 16  SpO2:  [96 %-97 %] 97 %  O2 Device: None (Room air)    Mental Status Evaluation:    Appearance:  disheveled, marginal hygiene   Behavior:  pleasant, cooperative, calm   Speech:  normal rate and volume   Mood:  euthymic   Affect:  slightly brighter, constricted   Thought Process:  goal directed, logical, linear   Thought Content:  no overt delusions   Perceptual Disturbances: no auditory hallucinations, no visual hallucinations   Risk Potential: Suicidal Ideation -  None  Homicidal Ideation -  None  Potential for Aggression - No   Sensorium:  oriented to person, place, and time/date   Memory:  recent and remote memory grossly intact   Consciousness:  alert and awake   Attention/Concentration: attention span and concentration are age  appropriate   Insight:  improved   Judgment: improved   Gait/Station: normal gait/station, normal balance   Motor Activity: no abnormal movements       Lab Results: I have reviewed the following results:  Most Recent Labs:   Lab Results   Component Value Date    WBC 6.75 11/12/2024    RBC 5.06 11/12/2024    HGB 15.9 11/12/2024    HCT 46.2 11/12/2024     11/12/2024    RDW 12.8 11/12/2024    NEUTROABS 4.15 11/12/2024    SODIUM 139 11/12/2024    K 4.0 11/12/2024     11/12/2024    CO2 30 11/12/2024    BUN 17 11/12/2024    CREATININE 0.99 11/12/2024    GLUC 85 11/12/2024    CALCIUM 9.4 11/12/2024    AST 23 11/12/2024    ALT 32 11/12/2024    ALKPHOS 73 11/12/2024    TP 8.1 11/12/2024    ALB 4.4 11/12/2024    TBILI 0.54 11/12/2024    CHOLESTEROL 203 (H) 01/23/2025    HDL 45 01/23/2025    TRIG 222 (H) 01/23/2025    LDLCALC 114 (H) 01/23/2025    NONHDLC 158 01/23/2025    TNH8OQDVVIKA 2.532 11/12/2024    FREET4 0.56 (L) 09/10/2024    SYPHILISAB Non-reactive 05/15/2024       Administrative Statements     Counseling / Coordination of Care:   Patient's progress discussed with staff in treatment team meeting.  Medication changes reviewed with staff in treatment team meeting..    Basilio Panda MD 03/30/25

## 2025-03-30 NOTE — ASSESSMENT & PLAN NOTE
"  D/C 3/31/25 to personal care home    Patient continues to do well and the plan is to continue with the current treatment plan as mentioned below with no changes.    Patient continues to be pending group home placement. Otherwise no clinical significant change.   continues to look for group home/personal-care home placement however this can be significantly impacted by patient no longer having money in the [employer] account and his bank account being over drawn.  Patient is quite altruistic in his desire not to have his friend go to custodial however continues to display poor to limited insight regarding his ability to advocate for himself and organize/prioritize his own needs given his own difficult social situation. Would be at significant risk of harm if not discharged to a structured setting with support.    Medication regimen as follows, no changes as of 3/28/2025:  Abilify 5 mg daily as mood adjunct   Zoloft 150 mg daily for depression and anxiety  Continue to encourage participation in group therapy, milieu therapy and occupational therapy.  Continue to assess for side effects of medications.  Continue collaboration with PATRICIA for medical co-morbidities as indicated.  Continue discussion with CM/SW to assist with obtaining collateral, disposition planning, and the implementation of patient-centered individualized plan of care.  Continue frequent safety checks and vitals per unit protocol.    Risks, benefits and possible side effects of Medications: Risks, benefits, and possible side effects of medications have previously been explained. No new medications at this time.      Legal status: 201    Disposition: to be determined, pending SOAR application for potential group home placement. OT Cognitive Evaluation completed: \"Pt would benefit from discharge to a supportive environment that can provide checks for safety and compliance with IADLs. \"   Although patient's mood has stabilized, they are currently " awaiting group home placement.  Their ability to recognize safety hazards take medications and participate in IADLs are significantly impaired by their cognitive deficits compounded by their chronic mental health needs and would be at risk for significant decompensation without the structure and support of a group home setting.      No associated orders from this encounter found during lookback period of 72 hours.

## 2025-03-30 NOTE — NURSING NOTE
"Pt is calm, cooperative, isolating/withdrawn to self in room. Pt denies SI, HI, AH, VH and pain at this time. Pt is seen milieu for needs and to get his meals. Pt does not go to groups. Brightens on approach. Pt states he is \"nervous but excited for discharge. I just feel like I know so many people in here. Out there I don't know anyone.\"Pt denies any unmet needs and remains on q15 min checks for safety.   "

## 2025-03-30 NOTE — NURSING NOTE
Patient secluded to room for most of the shift, patient appears calm and cooperative. Patient denies Depression SI/HI/AH/VH at this time.

## 2025-03-30 NOTE — PLAN OF CARE
Problem: Ineffective Coping  Goal: Participates in unit activities  Description: Interventions:  - Provide therapeutic environment   - Provide required programming   - Redirect inappropriate behaviors   Outcome: Progressing     Problem: Depression  Goal: Treatment Goal: Demonstrate behavioral control of depressive symptoms, verbalize feelings of improved mood/affect, and adopt new coping skills prior to discharge  Outcome: Progressing  Goal: Verbalize thoughts and feelings  Description: Interventions:  - Assess and re-assess patient's level of risk   - Engage patient in 1:1 interactions, daily, for a minimum of 15 minutes   - Encourage patient to express feelings, fears, frustrations, hopes   Outcome: Progressing  Goal: Refrain from harming self  Description: Interventions:  - Monitor patient closely, per order   - Supervise medication ingestion, monitor effects and side effects   Outcome: Progressing  Goal: Refrain from isolation  Description: Interventions:  - Develop a trusting relationship   - Encourage socialization   Outcome: Progressing     Problem: Anxiety  Goal: Anxiety is at manageable level  Description: Interventions:  - Assess and monitor patient's anxiety level.   - Monitor for signs and symptoms (heart palpitations, chest pain, shortness of breath, headaches, nausea, feeling jumpy, restlessness, irritable, apprehensive).   - Collaborate with interdisciplinary team and initiate plan and interventions as ordered.  - [place] patient to unit/surroundings  - Explain treatment plan  - Encourage participation in care  - Encourage verbalization of concerns/fears  - Identify coping mechanisms  - Assist in developing anxiety-reducing skills  - Administer/offer alternative therapies  - Limit or eliminate stimulants  Outcome: Progressing     Problem: DISCHARGE PLANNING - CARE MANAGEMENT  Goal: Discharge to post-acute care or home with appropriate resources  Description: INTERVENTIONS:  - Conduct assessment to  determine patient/family and health care team treatment goals, and need for post-acute services based on payer coverage, community resources, and patient preferences, and barriers to discharge  - Address psychosocial, clinical, and financial barriers to discharge as identified in assessment in conjunction with the patient/family and health care team  - Arrange appropriate level of post-acute services according to patient’s   needs and preference and payer coverage in collaboration with the physician and health care team  - Communicate with and update the patient/family, physician, and health care team regarding progress on the discharge plan  - Arrange appropriate transportation to post-acute venues  Outcome: Progressing     Problem: Knowledge Deficit  Goal: Patient/family/caregiver demonstrates understanding of disease process, treatment plan, medications, and discharge instructions  Description: Complete learning assessment and assess knowledge base.  Interventions:  - Provide teaching at level of understanding  - Provide teaching via preferred learning methods  Outcome: Progressing     Problem: SLEEP DISTURBANCE  Goal: Will exhibit normal sleeping pattern  Description: Interventions:  -  Assess the patients sleep pattern, noting recent changes  - Administer medication as ordered  - Decrease environmental stimuli, including noise, as appropriate during the night  - Encourage the patient to actively participate in unit groups and or exercise during the day to enhance ability to achieve adequate sleep at night  - Assess the patient, in the morning, encouraging a description of sleep experience  Outcome: Progressing

## 2025-03-31 VITALS
HEART RATE: 76 BPM | BODY MASS INDEX: 39.55 KG/M2 | SYSTOLIC BLOOD PRESSURE: 109 MMHG | DIASTOLIC BLOOD PRESSURE: 74 MMHG | OXYGEN SATURATION: 93 % | RESPIRATION RATE: 18 BRPM | WEIGHT: 292 LBS | HEIGHT: 72 IN | TEMPERATURE: 97.5 F

## 2025-03-31 PROCEDURE — 99238 HOSP IP/OBS DSCHRG MGMT 30/<: CPT | Performed by: PSYCHIATRY & NEUROLOGY

## 2025-03-31 RX ADMIN — Medication 2500 UNITS: at 08:09

## 2025-03-31 RX ADMIN — CYANOCOBALAMIN TAB 1000 MCG 1000 MCG: 1000 TAB at 08:09

## 2025-03-31 RX ADMIN — ARIPIPRAZOLE 5 MG: 5 TABLET ORAL at 08:08

## 2025-03-31 RX ADMIN — FAMOTIDINE 20 MG: 20 TABLET ORAL at 08:09

## 2025-03-31 RX ADMIN — SERTRALINE HYDROCHLORIDE 150 MG: 100 TABLET ORAL at 08:09

## 2025-03-31 RX ADMIN — LEVOTHYROXINE SODIUM 37.5 MCG: 0.07 TABLET ORAL at 06:11

## 2025-03-31 NOTE — PLAN OF CARE
Problem: Ineffective Coping  Goal: Participates in unit activities  Description: Interventions:  - Provide therapeutic environment   - Provide required programming   - Redirect inappropriate behaviors   Outcome: Completed     Problem: Depression  Goal: Treatment Goal: Demonstrate behavioral control of depressive symptoms, verbalize feelings of improved mood/affect, and adopt new coping skills prior to discharge  Outcome: Completed  Goal: Verbalize thoughts and feelings  Description: Interventions:  - Assess and re-assess patient's level of risk   - Engage patient in 1:1 interactions, daily, for a minimum of 15 minutes   - Encourage patient to express feelings, fears, frustrations, hopes   Outcome: Completed  Goal: Refrain from harming self  Description: Interventions:  - Monitor patient closely, per order   - Supervise medication ingestion, monitor effects and side effects   Outcome: Completed  Goal: Refrain from isolation  Description: Interventions:  - Develop a trusting relationship   - Encourage socialization   Outcome: Completed     Problem: Anxiety  Goal: Anxiety is at manageable level  Description: Interventions:  - Assess and monitor patient's anxiety level.   - Monitor for signs and symptoms (heart palpitations, chest pain, shortness of breath, headaches, nausea, feeling jumpy, restlessness, irritable, apprehensive).   - Collaborate with interdisciplinary team and initiate plan and interventions as ordered.  - [place] patient to unit/surroundings  - Explain treatment plan  - Encourage participation in care  - Encourage verbalization of concerns/fears  - Identify coping mechanisms  - Assist in developing anxiety-reducing skills  - Administer/offer alternative therapies  - Limit or eliminate stimulants  Outcome: Completed     Problem: DISCHARGE PLANNING - CARE MANAGEMENT  Goal: Discharge to post-acute care or home with appropriate resources  Description: INTERVENTIONS:  - Conduct assessment to determine  patient/family and health care team treatment goals, and need for post-acute services based on payer coverage, community resources, and patient preferences, and barriers to discharge  - Address psychosocial, clinical, and financial barriers to discharge as identified in assessment in conjunction with the patient/family and health care team  - Arrange appropriate level of post-acute services according to patient’s   needs and preference and payer coverage in collaboration with the physician and health care team  - Communicate with and update the patient/family, physician, and health care team regarding progress on the discharge plan  - Arrange appropriate transportation to post-acute venues  Outcome: Completed     Problem: Knowledge Deficit  Goal: Patient/family/caregiver demonstrates understanding of disease process, treatment plan, medications, and discharge instructions  Description: Complete learning assessment and assess knowledge base.  Interventions:  - Provide teaching at level of understanding  - Provide teaching via preferred learning methods  Outcome: Completed     Problem: SLEEP DISTURBANCE  Goal: Will exhibit normal sleeping pattern  Description: Interventions:  -  Assess the patients sleep pattern, noting recent changes  - Administer medication as ordered  - Decrease environmental stimuli, including noise, as appropriate during the night  - Encourage the patient to actively participate in unit groups and or exercise during the day to enhance ability to achieve adequate sleep at night  - Assess the patient, in the morning, encouraging a description of sleep experience  Outcome: Completed

## 2025-03-31 NOTE — DISCHARGE SUMMARY
Discharge Summary -  Behavioral Health   Name: Franco Roberson 39 y.o. male I MRN: [id number]  Unit/Bed#: -02 I Date of Admission: 5/14/2024   Date of Service: 3/31/2025 I Hospital Day: 321      Assessment & Plan  MDD (major depressive disorder), recurrent severe, without psychosis (HCC)    D/C 3/31/25 to personal care home    Patient continues to do well and the plan is to continue with the current treatment plan as mentioned below with no changes.    Patient continues to be pending group home placement. Otherwise no clinical significant change.   continues to look for group home/personal-care home placement however this can be significantly impacted by patient no longer having money in the [employer] account and his bank account being over drawn.  Patient is quite altruistic in his desire not to have his friend go to shelter however continues to display poor to limited insight regarding his ability to advocate for himself and organize/prioritize his own needs given his own difficult social situation. Would be at significant risk of harm if not discharged to a structured setting with support.    Medication regimen as follows, no changes as of 3/28/2025:  Abilify 5 mg daily as mood adjunct   Zoloft 150 mg daily for depression and anxiety  Continue to encourage participation in group therapy, milieu therapy and occupational therapy.  Continue to assess for side effects of medications.  Continue collaboration with PATRICIA for medical co-morbidities as indicated.  Continue discussion with CM/SW to assist with obtaining collateral, disposition planning, and the implementation of patient-centered individualized plan of care.  Continue frequent safety checks and vitals per unit protocol.    Risks, benefits and possible side effects of Medications: Risks, benefits, and possible side effects of medications have previously been explained. No new medications at this time.      Legal status: 201    Disposition: to be  "determined, pending SOAR application for potential group home placement. OT Cognitive Evaluation completed: \"Pt would benefit from discharge to a supportive environment that can provide checks for safety and compliance with IADLs. \"   Although patient's mood has stabilized, they are currently awaiting group home placement.  Their ability to recognize safety hazards take medications and participate in IADLs are significantly impaired by their cognitive deficits compounded by their chronic mental health needs and would be at risk for significant decompensation without the structure and support of a group home setting.      No associated orders from this encounter found during lookback period of 72 hours.    Autism spectrum disorder  Continue supportive care    No associated orders from this encounter found during lookback period of 72 hours.    Hyperlipidemia  - Lipitor 40 mg        Admission Date: 5/14/2024       Discharge Date: 03/31/25  Attending Psychiatrist: Dustin Mcallister MD    Admission Diagnosis:    Principal Problem:    MDD (major depressive disorder), recurrent severe, without psychosis (HCC)  Active Problems:    Unspecified depressive disorder (HCC)    Medical clearance for psychiatric admission    Hyperlipidemia    Vitamin D deficiency    B12 deficiency    Hypothyroidism    Autism spectrum disorder      Discharge Diagnosis:     Principal Problem:    MDD (major depressive disorder), recurrent severe, without psychosis (HCC)  Active Problems:    Unspecified depressive disorder (HCC)    Medical clearance for psychiatric admission    Hyperlipidemia    Vitamin D deficiency    B12 deficiency    Hypothyroidism    Autism spectrum disorder  Resolved Problems:    * No resolved hospital problems. *      Reason for Admission/HPI (as per admission documentation):     Franco is a 39 y.o. male with history of unspecified depression and anxiety who presents voluntarily via a 201 for SI with a vague plan to jump off a " "bridge or into traffic.     He was initially encountered in [facility] ED on 5/14 for SI. He was seen by a crisis worker and was cooperative on exam. He was agreeable to be transferred to the inpatient behavioral health unit and signed a 201. On arrival to the unit patient had no acute behavioral issues and has a blunted affect as well is guarded in discussing his history with staff.     Franco states that he has been feeling increasingly hopeless since he was evicted from his apartment on 5/14, and that suicide seemed like his only option. He previously lived with his mother in the apartment and has not been able to pay rent since her passing on December 1st 2024. He notes that he has had depression and anxiety since he was a young boy at about 4 years of age, but was only treated briefly with Prozac and Xanax in his teenage years. He was not able to tolerate either medication as the Prozac made him \"verbally aggressive\" and the Xanax made him \"too sleepy.\" He has never been on an inpatient psychiatric unit and has not been followed by a psychiatrist for over 25 years. He notes that the depression has persisted with him most of his life but up until losing his mother and becoming homeless that it was not something that interfered with his life very much. His only suicide attempt was 2 months ago when he took a bunch of sleeping pills since he was dreading becoming homeless. He did not seek any medical attention for this intentional overdose. He denies any history of patrick, hallucinations, delusions or self harm behavior. He has never drank any alcohol, used any tobacco nor used any other substances. He has a poor social support network and states that he only has one friend who is disabled. He notes that he has always been content with being alone and that even when his mother was alive he would often spend all day in his room alone watching movies and playing video games. In the past two weeks Franco has been " experiencing some guilt about not spending as much time with his mother before her passing but denies difficulty sleeping, appetite changes, anhedonia or difficulty concentrating. At present Franco denies any SI, HI, or AVH. Franco notes that if he were outside of the hospital and homeless again that he would likely try to end his life, though does contract for safety on the unit.    Past Medical History:   Diagnosis Date    Depression     Psychiatric disorder     Suicide attempt (HCC)      No past surgical history on file.    Medications:    Current Facility-Administered Medications   Medication Dose Route Frequency Provider Last Rate    acetaminophen  650 mg Oral Q6H PRN Basilio Brown MD      acetaminophen  650 mg Oral Q4H PRN Basilio Brown MD      acetaminophen  975 mg Oral Q6H PRN Basilio Brown MD      aluminum-magnesium hydroxide-simethicone  30 mL Oral Q4H PRN Basilio Brown MD      ARIPiprazole  5 mg Oral Daily Basliio Panda MD      Artificial Tears  1 drop Both Eyes Q3H PRN Basilio Brown MD      atorvastatin  40 mg Oral Daily With Dinner WALLY Baptiste      benztropine  1 mg Intramuscular Q4H PRN Max 6/day Basilio Brown MD      benztropine  1 mg Oral Q4H PRN Max 6/day Basilio Brown MD      Cholecalciferol  2,500 Units Oral Daily Dustin Mcallister MD      cyanocobalamin  1,000 mcg Oral Daily WALLY Baptiste      Diclofenac Sodium  2 g Topical 4x Daily PRN WALLY Baptiste      hydrOXYzine HCL  50 mg Oral Q6H PRN Max 4/day Basilio Brown MD      Or    diphenhydrAMINE  50 mg Intramuscular Q6H PRN Basilio Brown MD      diphenhydrAMINE-zinc acetate   Topical Daily PRN Raj Guerrero MD      famotidine  20 mg Oral BID WALLY Baptiste      hydrOXYzine HCL  100 mg Oral Q6H PRN Max 4/day Basilio Brown MD      Or    LORazepam  2 mg Intramuscular Q6H PRN Basilio Brown MD      hydrOXYzine HCL  25 mg  Oral Q6H PRN Max 4/day Basilio Brown MD      levothyroxine  37.5 mcg Oral Early Morning WALLY Baptiste      melatonin  6 mg Oral HS PRN WALLY Gomez      methocarbamol  500 mg Oral Q6H PRN WALLY Baptiste      OLANZapine  5 mg Oral Q4H PRN Max 3/day Basilio Brown MD      Or    OLANZapine  2.5 mg Intramuscular Q4H PRN Max 3/day Basilio Brown MD      OLANZapine  5 mg Oral Q3H PRN Max 3/day Basilio Brown MD      Or    OLANZapine  5 mg Intramuscular Q3H PRN Max 3/day Basilio Brown MD      OLANZapine  2.5 mg Oral Q4H PRN Max 6/day Basilio Brown MD      polyethylene glycol  17 g Oral Daily PRN Basilio Brown MD      propranolol  10 mg Oral Q8H PRN Basilio Brown MD      senna-docusate sodium  2 tablet Oral After Dinner Dustin Mcallister MD      sertraline  150 mg Oral Daily WALLY Gomez      traZODone  50 mg Oral HS PRN Basilio Brown MD         Allergies:     No Known Allergies    Hospital Course:  Franco was admitted to the inpatient psychiatric unit on 5/14/2024. During their hospitalization, Franco was encouraged to attend groups and interact appropriately and positively with others in the milieu.     Franco was started on the following psychiatric medications: Abilify and Zoloft. These medications were titrated up to a therapeutic range as evidenced by a reduction in Franco's reported psychiatric symptomatology. There were no side effects noted or reported throughout Franco's psychiatric hospitalization.  Patient's stay was protracted on unit secondary to the need to find personal-care home for patient as he is unable to live independently at this time secondary to cognitive limitations.  Evaluation by Occupational Therapy was done during this admission which indicated group home level of care.    Final psychiatric medication regimen is as follows  Abilify 5 mg daily  Zoloft 150 mg daily    At the time of discharge,  "there were no criteria present through which Franco could be kept involuntarily for further psychiatric hospitalization. Patient was able to articulate a safety plan upon discharge home, including going to any ED if their symptoms return or worsen, or calling 911. We discussed mitigating factors against suicidal behavior, and the patient was able to articulate these mitigating factors which outweighed any potential exacerbating stressors. Patient explicitly denied suicidal ideation, plan, or intent. They explicitly stated they felt safe to return to the community.     An outpatient discharge/follow up plan was discussed and coordinated between Franco, this writer, and case management team.    Specific discharge disposition: See AVS.    Vital signs in last 24 hours:    Temp:  [97.3 °F (36.3 °C)-97.5 °F (36.4 °C)] 97.5 °F (36.4 °C)  HR:  [76-95] 76  BP: (109-124)/(71-74) 109/74  Resp:  [16-18] 18  SpO2:  [93 %-95 %] 93 %  O2 Device: None (Room air)    Mental Status Exam on day of discharge:  Appearance: casually dressed, appears consistent with stated age  Motor: no psychomotor disturbances, no gait abnormalities  Behavior: cooperative, interacts with this writer appropriately  Speech: normal rate, rhythm, and volume  Mood: \"good\"  Affect: euthymic, normal range and intensity  Thought Process: organized, linear, and goal-oriented  Thought Content: denies delusions or paranoia  Perception: denies auditory hallucinations, denies visual hallucinations,   Risk Potential: denies suicidal ideation, plan, or intent. Denies homicidal ideation  Sensorium: Oriented to person, place, time, and situation  Cognition: cognitive ability appears intact but was not quantitatively tested  Consciousness: alert and awake  Attention: able to focus without difficulty  Insight: improved  Judgement: improved    Suicide/Homicide Risk Assessment:    Risk of Harm to Self:   Patient denied suicidal thoughts, intent, plan, or acts of furtherance " at the time of discharge.    Risk of Harm to Others:  Patient denied homicidal thoughts or intent at the time of discharge    Laboratory Results: I have personally reviewed the following pertinent lab results.    No results found for this or any previous visit (from the past 48 hours).     Discharge Medications:    Current Discharge Medication List        START taking these medications    Details   ARIPiprazole (ABILIFY) 5 mg tablet Take 1 tablet (5 mg total) by mouth daily  Qty: 30 tablet, Refills: 1    Associated Diagnoses: MDD (major depressive disorder), recurrent severe, without psychosis (HCC)      atorvastatin (LIPITOR) 40 mg tablet Take 1 tablet (40 mg total) by mouth daily with dinner  Qty: 30 tablet, Refills: 1    Associated Diagnoses: Hyperlipidemia      Cholecalciferol (VITAMIN D3) 1,000 units tablet Take 2.5 tablets (2,500 Units total) by mouth daily  Qty: 75 tablet, Refills: 1    Associated Diagnoses: Vitamin D deficiency      cyanocobalamin (VITAMIN B-12) 1000 MCG tablet Take 1 tablet (1,000 mcg total) by mouth daily  Qty: 30 tablet, Refills: 1    Associated Diagnoses: Vitamin B 12 deficiency      famotidine (PEPCID) 20 mg tablet Take 1 tablet (20 mg total) by mouth 2 (two) times a day  Qty: 60 tablet, Refills: 1    Associated Diagnoses: GERD (gastroesophageal reflux disease)      levothyroxine 75 mcg tablet Take 0.5 tablets (37.5 mcg total) by mouth daily in the early morning  Qty: 15 tablet, Refills: 1    Associated Diagnoses: Hypothyroidism      senna-docusate sodium (SENOKOT S) 8.6-50 mg per tablet Take 2 tablets by mouth daily after dinner  Qty: 60 tablet, Refills: 1    Associated Diagnoses: Constipation      sertraline (ZOLOFT) 50 mg tablet Take 3 tablets (150 mg total) by mouth daily  Qty: 90 tablet, Refills: 1    Associated Diagnoses: MDD (major depressive disorder), recurrent severe, without psychosis (HCC)              Current Discharge Medication List        STOP taking these medications        oxyCODONE-acetaminophen (PERCOCET) 5-325 mg per tablet Comments:   Reason for Stopping:                Current Discharge Medication List           Current Discharge Medication List           Discharge instructions/Information to patient and family:   See After Visit Summary for information provided to patient and family.      Provisions for Follow-Up Care:  See after visit summary for information related to follow-up care and any pertinent home health orders.      Discharge Statement:    I spent 30 minutes discharging the patient. This time was spent on the day of discharge. I had direct contact with the patient on the day of discharge.     Additional documentation is required if more than 30 minutes were spent on discharge:    I had face-to-face contact with the patient and discussed discharge treatment plan  I reviewed the importance of compliance with medications and outpatient treatment after discharge with the patient.  I discussed discharge and treatment plan with the patient, nursing, and case management/social work.  I discussed the medication regimen and possible side effects of the medications with the patient prior to discharge. At the time of discharge they were tolerating psychiatric medications.  I discussed outpatient follow up with the patient as per treatment plan / aftercare summary.  I reviewed elements of the aftercare plan with the patient. I discussed that if symptoms worsen or reoccur, that the patient can return to the emergency room or dial 911 in an emergency.  I reviewed pertinent mitigating vs exacerbating stressors, as well as other risk factors, as they relate to potential suicidal behavior.  I reviewed the patient's hospital course and current psychiatric disease status. As of today, they are now at goal. They are psychiatrically stable for discharge home. The combination of active psychopharmacological medication management, interdisciplinary coordination with case management, and  adjunctive milieu and group therapy to augment psychopharmacological efficacy appears to have been successful. The patient's risk of morbidity, and progression or decompensation of psychiatric disease, is lower today as compared to the day of admission.        Dustin Mcallister MD 03/31/25

## 2025-03-31 NOTE — ASSESSMENT & PLAN NOTE
"  D/C 3/31/25 to personal care home    Patient continues to do well and the plan is to continue with the current treatment plan as mentioned below with no changes.    Patient continues to be pending group home placement. Otherwise no clinical significant change.   continues to look for group home/personal-care home placement however this can be significantly impacted by patient no longer having money in the [employer] account and his bank account being over drawn.  Patient is quite altruistic in his desire not to have his friend go to snf however continues to display poor to limited insight regarding his ability to advocate for himself and organize/prioritize his own needs given his own difficult social situation. Would be at significant risk of harm if not discharged to a structured setting with support.    Medication regimen as follows, no changes as of 3/28/2025:  Abilify 5 mg daily as mood adjunct   Zoloft 150 mg daily for depression and anxiety  Continue to encourage participation in group therapy, milieu therapy and occupational therapy.  Continue to assess for side effects of medications.  Continue collaboration with PATRICIA for medical co-morbidities as indicated.  Continue discussion with CM/SW to assist with obtaining collateral, disposition planning, and the implementation of patient-centered individualized plan of care.  Continue frequent safety checks and vitals per unit protocol.    Risks, benefits and possible side effects of Medications: Risks, benefits, and possible side effects of medications have previously been explained. No new medications at this time.      Legal status: 201    Disposition: to be determined, pending SOAR application for potential group home placement. OT Cognitive Evaluation completed: \"Pt would benefit from discharge to a supportive environment that can provide checks for safety and compliance with IADLs. \"   Although patient's mood has stabilized, they are currently " awaiting group home placement.  Their ability to recognize safety hazards take medications and participate in IADLs are significantly impaired by their cognitive deficits compounded by their chronic mental health needs and would be at risk for significant decompensation without the structure and support of a group home setting.      No associated orders from this encounter found during lookback period of 72 hours.

## 2025-03-31 NOTE — PLAN OF CARE
Problem: Depression  Goal: Treatment Goal: Demonstrate behavioral control of depressive symptoms, verbalize feelings of improved mood/affect, and adopt new coping skills prior to discharge  Outcome: Progressing  Goal: Verbalize thoughts and feelings  Description: Interventions:  - Assess and re-assess patient's level of risk   - Engage patient in 1:1 interactions, daily, for a minimum of 15 minutes   - Encourage patient to express feelings, fears, frustrations, hopes   Outcome: Progressing  Goal: Refrain from harming self  Description: Interventions:  - Monitor patient closely, per order   - Supervise medication ingestion, monitor effects and side effects   Outcome: Progressing  Goal: Refrain from isolation  Description: Interventions:  - Develop a trusting relationship   - Encourage socialization   Outcome: Progressing     Problem: Anxiety  Goal: Anxiety is at manageable level  Description: Interventions:  - Assess and monitor patient's anxiety level.   - Monitor for signs and symptoms (heart palpitations, chest pain, shortness of breath, headaches, nausea, feeling jumpy, restlessness, irritable, apprehensive).   - Collaborate with interdisciplinary team and initiate plan and interventions as ordered.  - [place] patient to unit/surroundings  - Explain treatment plan  - Encourage participation in care  - Encourage verbalization of concerns/fears  - Identify coping mechanisms  - Assist in developing anxiety-reducing skills  - Administer/offer alternative therapies  - Limit or eliminate stimulants  Outcome: Progressing     Problem: DISCHARGE PLANNING - CARE MANAGEMENT  Goal: Discharge to post-acute care or home with appropriate resources  Description: INTERVENTIONS:  - Conduct assessment to determine patient/family and health care team treatment goals, and need for post-acute services based on payer coverage, community resources, and patient preferences, and barriers to discharge  - Address psychosocial, clinical,  and financial barriers to discharge as identified in assessment in conjunction with the patient/family and health care team  - Arrange appropriate level of post-acute services according to patient’s   needs and preference and payer coverage in collaboration with the physician and health care team  - Communicate with and update the patient/family, physician, and health care team regarding progress on the discharge plan  - Arrange appropriate transportation to post-acute venues  Outcome: Progressing     Problem: Knowledge Deficit  Goal: Patient/family/caregiver demonstrates understanding of disease process, treatment plan, medications, and discharge instructions  Description: Complete learning assessment and assess knowledge base.  Interventions:  - Provide teaching at level of understanding  - Provide teaching via preferred learning methods  Outcome: Progressing     Problem: Ineffective Coping  Goal: Participates in unit activities  Description: Interventions:  - Provide therapeutic environment   - Provide required programming   - Redirect inappropriate behaviors   Outcome: Not Progressing     Problem: SLEEP DISTURBANCE  Goal: Will exhibit normal sleeping pattern  Description: Interventions:  -  Assess the patients sleep pattern, noting recent changes  - Administer medication as ordered  - Decrease environmental stimuli, including noise, as appropriate during the night  - Encourage the patient to actively participate in unit groups and or exercise during the day to enhance ability to achieve adequate sleep at night  - Assess the patient, in the morning, encouraging a description of sleep experience  Outcome: Not Progressing  Note: He has slept poorly for last 2 nights and believes it is due to pending discharge.

## 2025-03-31 NOTE — NURSING NOTE
Pt is bright on approach, quiet, and cooperative. Pt is visible out on the milieu. Pt denies depression and anxiety at this time. Pt reports being nervous and excited about leaving. Pt denies SI/HI and AH/VH. No unmet needs reported.

## 2025-03-31 NOTE — SOCIAL WORK
Cm met with Pt and discussed discharge planning. Cm informed Pt that [facility] is unable to transport him and the hospital will provide him transportation to the [facility]. Pt understood.   Pt and Cm discussed that Pt will start PHP tomorrow

## 2025-03-31 NOTE — BH TRANSITION RECORD
Contact Information: If you have any questions, concerns, pended studies, tests and/or procedures, or emergencies regarding your inpatient behavioral health visit. Please contact [facility] [phone]  and ask to speak to a , nurse or physician. A contact is available 24 hours/ 7 days a week at this number.     Summary of Procedures Performed During your Stay:  Below is a list of major procedures performed during your hospital stay and a summary of results:  - Cardiac Procedures/Studies: EKG.  Normal sinus rhythm  Early repolarization  Normal ECG  When compared with ECG of 15-MAY-2024 12:22,  No significant change was found  Confirmed by Alisha Jimenez ([id number]) on 5/16/2024 3:58:15 PM  Pending Studies (From admission, onward)      None          Please follow up on the above pending studies with your PCP and/or referring provider.

## 2025-03-31 NOTE — NURSING NOTE
"Pt is calm, cooperative, isolates to self but is seen in the milieu this morning pacing the hallways. Pt denies SI, HI, AH, VH and pain at this time. Pt states he is \"Anxious but excited for discharge today.\" Pt denies any unmet needs and remains on q15 min checks for safety.  "

## 2025-03-31 NOTE — NURSING NOTE
Pt awoke and completed all morning routines.  AVS printed and reviewed with Pt.  Pt verbalized understanding of discharge instructions and agreed to be compliant with their medication regimen. Pt discharged to [facility] via lyft at 1420. Pt was discharged with all of their belongings at time of discharge.

## 2025-04-10 NOTE — CASE MANAGEMENT
[employer] has requested additional documentation for patient's [employer] application from this writer, as this writer initiated application and serves as his representative until the application is completed. Writer submitted additional documentation as requested. 4/10/25 2943
